# Patient Record
Sex: MALE | Race: WHITE | Employment: OTHER | URBAN - METROPOLITAN AREA
[De-identification: names, ages, dates, MRNs, and addresses within clinical notes are randomized per-mention and may not be internally consistent; named-entity substitution may affect disease eponyms.]

---

## 2017-01-05 ENCOUNTER — OFFICE VISIT (OUTPATIENT)
Dept: GASTROENTEROLOGY | Facility: CLINIC | Age: 47
End: 2017-01-05
Attending: NURSE PRACTITIONER
Payer: MEDICARE

## 2017-01-05 VITALS
HEIGHT: 69 IN | BODY MASS INDEX: 30.21 KG/M2 | SYSTOLIC BLOOD PRESSURE: 120 MMHG | TEMPERATURE: 98 F | DIASTOLIC BLOOD PRESSURE: 77 MMHG | WEIGHT: 204 LBS | RESPIRATION RATE: 16 BRPM | HEART RATE: 64 BPM | OXYGEN SATURATION: 97 %

## 2017-01-05 DIAGNOSIS — K74.60 CIRRHOSIS OF LIVER WITH ASCITES, UNSPECIFIED HEPATIC CIRRHOSIS TYPE (H): Chronic | ICD-10-CM

## 2017-01-05 DIAGNOSIS — I85.00 ESOPHAGEAL VARICES WITHOUT BLEEDING, UNSPECIFIED ESOPHAGEAL VARICES TYPE (H): Primary | ICD-10-CM

## 2017-01-05 DIAGNOSIS — R18.8 CIRRHOSIS OF LIVER WITH ASCITES, UNSPECIFIED HEPATIC CIRRHOSIS TYPE (H): Chronic | ICD-10-CM

## 2017-01-05 LAB
ALBUMIN SERPL-MCNC: 3.7 G/DL (ref 3.4–5)
ALP SERPL-CCNC: 231 U/L (ref 40–150)
ALT SERPL W P-5'-P-CCNC: 74 U/L (ref 0–70)
ANION GAP SERPL CALCULATED.3IONS-SCNC: 9 MMOL/L (ref 3–14)
AST SERPL W P-5'-P-CCNC: 75 U/L (ref 0–45)
BILIRUB DIRECT SERPL-MCNC: 0.5 MG/DL (ref 0–0.2)
BILIRUB SERPL-MCNC: 0.8 MG/DL (ref 0.2–1.3)
BUN SERPL-MCNC: 8 MG/DL (ref 7–30)
CALCIUM SERPL-MCNC: 8.9 MG/DL (ref 8.5–10.1)
CHLORIDE SERPL-SCNC: 102 MMOL/L (ref 94–109)
CO2 SERPL-SCNC: 24 MMOL/L (ref 20–32)
CREAT SERPL-MCNC: 1.05 MG/DL (ref 0.66–1.25)
ERYTHROCYTE [DISTWIDTH] IN BLOOD BY AUTOMATED COUNT: 13.7 % (ref 10–15)
GFR SERPL CREATININE-BSD FRML MDRD: 75 ML/MIN/1.7M2
GLUCOSE SERPL-MCNC: 261 MG/DL (ref 70–99)
HCT VFR BLD AUTO: 38.4 % (ref 40–53)
HGB BLD-MCNC: 13.3 G/DL (ref 13.3–17.7)
INR PPP: 1.19 (ref 0.86–1.14)
MCH RBC QN AUTO: 33.1 PG (ref 26.5–33)
MCHC RBC AUTO-ENTMCNC: 34.6 G/DL (ref 31.5–36.5)
MCV RBC AUTO: 96 FL (ref 78–100)
PLATELET # BLD AUTO: 51 10E9/L (ref 150–450)
POTASSIUM SERPL-SCNC: 4 MMOL/L (ref 3.4–5.3)
PROT SERPL-MCNC: 7.9 G/DL (ref 6.8–8.8)
RBC # BLD AUTO: 4.02 10E12/L (ref 4.4–5.9)
SODIUM SERPL-SCNC: 135 MMOL/L (ref 133–144)
WBC # BLD AUTO: 1.5 10E9/L (ref 4–11)

## 2017-01-05 PROCEDURE — 99213 OFFICE O/P EST LOW 20 MIN: CPT | Mod: ZF

## 2017-01-05 PROCEDURE — 80076 HEPATIC FUNCTION PANEL: CPT | Performed by: NURSE PRACTITIONER

## 2017-01-05 PROCEDURE — 85027 COMPLETE CBC AUTOMATED: CPT | Performed by: NURSE PRACTITIONER

## 2017-01-05 PROCEDURE — 80048 BASIC METABOLIC PNL TOTAL CA: CPT | Performed by: NURSE PRACTITIONER

## 2017-01-05 PROCEDURE — 85610 PROTHROMBIN TIME: CPT | Performed by: NURSE PRACTITIONER

## 2017-01-05 PROCEDURE — 36415 COLL VENOUS BLD VENIPUNCTURE: CPT | Performed by: NURSE PRACTITIONER

## 2017-01-05 ASSESSMENT — PAIN SCALES - GENERAL: PAINLEVEL: NO PAIN (0)

## 2017-01-05 NOTE — NURSING NOTE
"Chief Complaint   Patient presents with     RECHECK     alcohol induced cirrhosis       Initial /77 mmHg  Pulse 64  Temp(Src) 98  F (36.7  C) (Oral)  Resp 16  Ht 1.765 m (5' 9.49\")  Wt 92.534 kg (204 lb)  BMI 29.70 kg/m2  SpO2 97% Estimated body mass index is 29.7 kg/(m^2) as calculated from the following:    Height as of this encounter: 1.765 m (5' 9.49\").    Weight as of this encounter: 92.534 kg (204 lb).  BP completed using cuff size: large    "

## 2017-01-05 NOTE — PATIENT INSTRUCTIONS
Next available upper endoscopy (ideally in the next week or two)    Appointment with Dr Darling (nexta available), to discuss esophageal varices management    Appointment with Manuelito tellez, counselor. Kavita Bach will reach out to him and ask him to connect with you for a good time to meet.    Kavita Bach will also connect with Dr Frank Garcia, pharmacist, and see if his visit is covered.     Ok to reduce Lactulose to 15 ml daily, for a goal of 2-3 BMs per day. Please monitor for any changes in alertness/confusion. If so, please let Kavita Bach know as we can consider a new prescription for Xifaxan.    If you have any questions or concerns about your last test results or plan of care, please call our clinic at [phone].

## 2017-01-05 NOTE — PROGRESS NOTES
REASON FOR VISIT:  Followup cirrhosis.      HISTORY OF PRESENT ILLNESS:  Mr. Mono Varghese is a pleasant 46-year-old  male who has a history of alcohol-induced cirrhosis complicated by variceal bleeding as well as cognitive changes thought possibly related to some degree of hepatic encephalopathy.  He has followed with my colleague, Dr. Elizabet Darling, as well as myself.  [facility] for a followup appointment.  Overall, he is indicating that he is doing fairly well.  He denies any problems with abdominal pain, nausea, vomiting or heartburn.  His appetite is fair (he eats when he is hungry, which is about 2 times per day).  His weight is up approximately 3 pounds.  There had been some concerns recently regarding possible reaccumulation of ascites; however, he did have an ultrasound approximately 3 weeks ago which did not show any ascites.  He is currently taking lactulose 30 mL every other day and has approximately 5-6 bowel movements per day.  He really does not like this and does wonder whether or not it is really needed or is there an alternative.  He does occasionally see bright red blood per rectum when wiping when he does have a large number of bowel movements.  He does have a history of large esophageal varices (has had 6 EGDs over the last year and a half with banding).  He last had an upper endoscopy procedure in 10/2016, which showed large esophageal varices.  He was advised to have a repeat EGD in 6-8 weeks for retreatment, but unfortunately, it was not ordered nor scheduled.  He indicates that he is sleeping well with the use of gabapentin.  His energy level is low.  He does have a history of depression.  He is currently on an antidepressant as well as sees a psychiatrist but is not seeing a counselor at this time.  He would be open to seeing one of our new Behavioral Health counselors here at the [facility].  Of note, he does have a history of portal vein  thrombosis for which he had previously been on Lovenox and followed with Hematology as well as had an IVC filter placed, but has since had his IVC filter removed and stopped the Lovenox under the care of Hematology.  He does have a large number of medications.  Of note, he is currently on Bactrim double strength twice daily prescribed by his cancer physician, Dr. Shaw.  Of note, he has not seen a pharmacist to review his medications recently.  He did have a recent bone density study which showed his bone density to be normal.  He has immunity to both hepatitis A and B.  He has never had hepatitis B.  He has had pneumonia vaccination as well as flu shot recently.      ALLERGIES AND MEDICATIONS:  Listed in the electronic medical record.      PHYSICAL EXAMINATION:   GENERAL:  Mr. Varghese is a pleasant, middle-aged,  male in no apparent distress.   VITAL SIGNS:  Blood pressure 120/77, heart rate 64 beats per minute, respiratory rate 16, temperature 98, O2 sat 97% on room air.      LABORATORY DATA:  His most recent laboratory tests results from today showed the following:  White count 1.5, hemoglobin 13.3, platelet count 51,000.  INR 1.19.  Glucose 261, sodium 135, potassium 4, creatinine 1.05.  Total bilirubin 0.8, albumin 3.7, alkaline phosphatase 231, ALT 74, AST 75.      IMAGING:  Most recent abdominal ultrasound from 12/14/2016 showed a cirrhotic-appearing liver with splenomegaly.  No mention of any ascites or worrisome liver masses noted.      ASSESSMENT:  Alcohol-induced cirrhosis, large esophageal varices.      PLAN:  At this time, I did advise Mr. Varghese to have a prompt upper endoscopy procedure rescheduled.  Unfortunately, this was not scheduled at the time of his last upper endoscopy procedure.  He is open to seeing our new Behavioral Health counselor, Mr. Manuelito Lara, and I will reach out to him to see if he can see him when he is here next.  I did advise a followup appointment with Dr. Darling  to review his large varices and see if there are any other options that might be worth exploring.  We did spend time talking about general cirrhosis wellness measures including medication and surgical safety as well as risks of infection and prevention measures.  He is open to seeing one of our medication therapy management pharmacists but wants to see if that is covered by insurance.  If it is, he is willing to see Dr. Frank Garcia at a next visit.  He will have a next ultrasound in 05/2017 for HCC screening.  All of their questions were addressed.      Thirty minutes were spent with this patient, greater than 50% spent in counseling and coordination of care.      cc:  King Louis M.D.          Lauro Shaw M.D.         Elizabet Darling M.D.

## 2017-01-05 NOTE — Clinical Note
1/5/2017       RE: Mono Varghese  [id number] [facility] [address]     Dear Colleague,    Thank you for referring your patient, Mono Varghese, to the [facility]. Please see a copy of my visit note below.    REASON FOR VISIT:  Followup cirrhosis.      HISTORY OF PRESENT ILLNESS:  Mr. Mono Varghese is a pleasant 46-year-old  male who has a history of alcohol-induced cirrhosis complicated by variceal bleeding as well as cognitive changes thought possibly related to some degree of hepatic encephalopathy.  He has followed with my colleague, Dr. Elizabet Darling, as well as myself.  He returns to the [facility] today for a followup appointment.  Overall, he is indicating that he is doing fairly well.  He denies any problems with abdominal pain, nausea, vomiting or heartburn.  His appetite is fair (he eats when he is hungry, which is about 2 times per day).  His weight is up approximately 3 pounds.  There had been some concerns recently regarding possible reaccumulation of ascites; however, he did have an ultrasound approximately 3 weeks ago which did not show any ascites.  He is currently taking lactulose 30 mL every other day and has approximately 5-6 bowel movements per day.  He really does not like this and does wonder whether or not it is really needed or is there an alternative.  He does occasionally see bright red blood per rectum when wiping when he does have a large number of bowel movements.  He does have a history of large esophageal varices (has had 6 EGDs over the last year and a half with banding).  He last had an upper endoscopy procedure in 10/2016, which showed large esophageal varices.  He was advised to have a repeat EGD in 6-8 weeks for retreatment, but unfortunately, it was not ordered nor scheduled.  He indicates that he is sleeping well with the use of gabapentin.  His energy level is low.  He does have a history of  depression.  He is currently on an antidepressant as well as sees a psychiatrist but is not seeing a counselor at this time.  He would be open to seeing one of our new Behavioral Health counselors here at the [facility].  Of note, he does have a history of portal vein thrombosis for which he had previously been on Lovenox and followed with Hematology as well as had an IVC filter placed, but has since had his IVC filter removed and stopped the Lovenox under the care of Hematology.  He does have a large number of medications.  Of note, he is currently on Bactrim double strength twice daily prescribed by his cancer physician, Dr. Shaw.  Of note, he has not seen a pharmacist to review his medications recently.  He did have a recent bone density study which showed his bone density to be normal.  He has immunity to both hepatitis A and B.  He has never had hepatitis B.  He has had pneumonia vaccination as well as flu shot recently.      ALLERGIES AND MEDICATIONS:  Listed in the electronic medical record.      PHYSICAL EXAMINATION:   GENERAL:  Mr. Varghese is a pleasant, middle-aged,  male in no apparent distress.   VITAL SIGNS:  Blood pressure 120/77, heart rate 64 beats per minute, respiratory rate 16, temperature 98, O2 sat 97% on room air.      LABORATORY DATA:  His most recent laboratory tests results from today showed the following:  White count 1.5, hemoglobin 13.3, platelet count 51,000.  INR 1.19.  Glucose 261, sodium 135, potassium 4, creatinine 1.05.  Total bilirubin 0.8, albumin 3.7, alkaline phosphatase 231, ALT 74, AST 75.      IMAGING:  Most recent abdominal ultrasound from 12/14/2016 showed a cirrhotic-appearing liver with splenomegaly.  No mention of any ascites or worrisome liver masses noted.      ASSESSMENT:  Alcohol-induced cirrhosis, large esophageal varices.      PLAN:  At this time, I did advise Mr. Varghese to have a prompt upper endoscopy procedure rescheduled.  Unfortunately,  this was not scheduled at the time of his last upper endoscopy procedure.  He is open to seeing our new Behavioral Health counselor, . Manuelito Lara, and I will reach out to him to see if he can see him when he is here next.  I did advise a followup appointment with Dr. Darling to review his large varices and see if there are any other options that might be worth exploring.  We did spend time talking about general cirrhosis wellness measures including medication and surgical safety as well as risks of infection and prevention measures.  He is open to seeing one of our medication therapy management pharmacists but wants to see if that is covered by insurance.  If it is, he is willing to see Dr. Frank Garcia at a next visit.  He will have a next ultrasound in 05/2017 for HCC screening.  All of their questions were addressed.      Thirty minutes were spent with this patient, greater than 50% spent in counseling and coordination of care.     Sincerely,    Kavita Bach NP     cc:  King Louis M.D.          Lauro Shaw M.D.         Elizabet Darling M.D.

## 2017-01-06 ENCOUNTER — TELEPHONE (OUTPATIENT)
Dept: INTERNAL MEDICINE | Facility: CLINIC | Age: 47
End: 2017-01-06

## 2017-01-06 NOTE — TELEPHONE ENCOUNTER
Visit Activities (Refresh list every visit): Phone call    I called Mono at Kavita Bach's request to offer [facility] services.  No answer. Left VM explaining who I am and why I am calling, and inviting him to call at his convenience.    VM included my phone number for return call: [phone].      Manuelito Lara MA, NANI  Lead Behavioral Health Clinician  [facility]    [email or web address]       Phone: [phone] (mobility extension)  Pager: [phone]

## 2017-01-11 ENCOUNTER — OFFICE VISIT (OUTPATIENT)
Dept: FAMILY MEDICINE | Facility: CLINIC | Age: 47
End: 2017-01-11

## 2017-01-11 ENCOUNTER — TELEPHONE (OUTPATIENT)
Dept: GASTROENTEROLOGY | Facility: CLINIC | Age: 47
End: 2017-01-11

## 2017-01-11 VITALS
RESPIRATION RATE: 18 BRPM | OXYGEN SATURATION: 98 % | DIASTOLIC BLOOD PRESSURE: 77 MMHG | WEIGHT: 203.8 LBS | BODY MASS INDEX: 29.67 KG/M2 | TEMPERATURE: 98 F | HEART RATE: 68 BPM | SYSTOLIC BLOOD PRESSURE: 132 MMHG

## 2017-01-11 DIAGNOSIS — K08.9 POOR DENTITION: Primary | ICD-10-CM

## 2017-01-11 ASSESSMENT — PAIN SCALES - GENERAL: PAINLEVEL: NO PAIN (0)

## 2017-01-11 NOTE — PROGRESS NOTES
SUBJECTIVE:    Pt is a 46 year old male with pmh of     Patient Active Problem List   Diagnosis     Type 2 diabetes mellitus (H)     Moderate major depression (H)     LENA (obstructive sleep apnea)-moderate (AHI 16)     Oligoastrocytoma of parietal lobe (H)     ADHD (attention deficit hyperactivity disorder)     Financial difficulties     Thrombocytopenia (H)     Partial epilepsy with impairment of consciousness (H)     Cirrhosis of liver (H)     Pulmonary nodules     Myopic astigmatism of both eyes     Presbyopia     Ringing in ears, unspecified laterality     GIB (gastrointestinal bleeding)     Portal vein thrombosis     Advance care planning     Chronic pain     Hyperlipidemia LDL goal <100     Pancytopenia (H)     Hypothyroidism     Malnutrition (H)     Hx of psychiatric care       who is here for evaluation of had concerns including Liver and Forms.    Here for a f/u.  Since last saw me, no new concerns; per pt/wife who is here too, since last increase DM meds BGs range 110-200, so better. Tolerates.   Interval liver clinic note rvwd, labs too.  Form done/returned to them for free dental care; copy kept to scan, see that.    Allergies   Allergen Reactions     No Clinical Screening - See Comments      Coban and Surgilast cause itching     Tegaderm Transparent Dressing (Informational Only)      Latex Itching and Rash           Current Outpatient Prescriptions   Medication Sig Dispense Refill     omeprazole (PRILOSEC) 20 MG CR capsule Take 2 capsules (40 mg) by mouth 2 times daily 120 capsule 11     hydrOXYzine (ATARAX) 25 MG tablet Take 1 tablet (25 mg) by mouth 2 times daily 180 tablet 1     glipiZIDE (GLUCOTROL XL) 10 MG 24 hr tablet Take 1 tablet (10 mg) by mouth daily 30 tablet 3     levothyroxine (SYNTHROID/LEVOTHROID) 88 MCG tablet Take 1 tablet (88 mcg) by mouth daily 90 tablet 1     traZODone (DESYREL) 50 MG tablet Take 1.5 tablets (75 mg) by mouth nightly as needed for sleep 90 tablet 1     thiamine 100  MG tablet Take 1 tablet (100 mg) by mouth daily 100 tablet 1     cyanocobalamin 1000 MCG TABS 1,000 mcg by Per G Tube route daily 130 tablet 1     ferrous sulfate (IRON) 325 (65 FE) MG tablet Take 1 tablet (325 mg) by mouth 2 times daily 200 tablet 3     pravastatin (PRAVACHOL) 80 MG tablet Take 1 tablet (80 mg) by mouth daily 90 tablet 1     lactulose 20 GM/30ML SOLN Take 30 mLs by mouth every morning (Patient taking differently: Take 30 mLs by mouth every other day ) 946 mL 11     sulfamethoxazole-trimethoprim (BACTRIM DS,SEPTRA DS) 800-160 MG per tablet Take 1 tablet by mouth 2 times daily 60 tablet 5     gabapentin (NEURONTIN) 100 MG capsule Take 1 capsule (100 mg) by mouth At Bedtime 30 capsule 3     escitalopram (LEXAPRO) 20 MG tablet Take 1 tablet (20 mg) by mouth daily 90 tablet 3     Phenytoin (DILANTIN PO) Take 30 mg by mouth daily       acetaminophen (TYLENOL) 500 MG tablet Take 1 tablet (500 mg) by mouth every 6 hours as needed for mild pain 100 tablet 1     HYDROcodone-acetaminophen (NORCO) 5-325 MG per tablet Take 2 tablets by mouth every 6 hours as needed for moderate to severe pain 60 tablet 0     cholecalciferol (VITAMIN  -D) 1000 UNITS capsule Take 1 capsule (1,000 Units) by mouth daily 90 capsule 1     Calcium Carb-Cholecalciferol (CALCIUM 500 +D) 500-400 MG-UNIT TABS Take 500 mg by mouth daily 90 tablet 1     blood glucose monitoring (ONE TOUCH ULTRA) test strip Use to test blood sugars 4 times daily as needed or as directed. 300 each 0     hypromellose (ARTIFICIAL TEARS) 0.4 % SOLN Place 2 drops into both eyes every 8 hours 1 Bottle 11     multivitamin, therapeutic with minerals (THERA-VIT-M) TABS Take 1 tablet by mouth 2 times daily       glucose 40 % GEL Take 30 g by mouth every 15 minutes as needed for low blood sugar       melatonin 5 MG tablet Take 5 mg by mouth nightly as needed for sleep       ONE TOUCH LANCETS MISC by In Vitro route 4 times daily as needed 100 each prn       Social  History   Substance Use Topics     Smoking status: Never Smoker      Smokeless tobacco: Never Used     Alcohol Use: No      Comment: Quit 01/2014             OBJECTIVE:  /77 mmHg  Pulse 68  Temp(Src) 98  F (36.7  C) (Oral)  Resp 18  Wt 92.443 kg (203 lb 12.8 oz)  SpO2 98%  GENERAL APPEARANCE: Alert, no acute distress  Oral exam shows multiple teeth with erosions and decay  NEURO: Alert, oriented, speech and mentation normal    ASSESSMENT/PLAN:    DM: better per BG's, monitor, see me in a mo  Poor dentition:  Form done  15 min visit over half couns/coord care nearly all on dental care form  See me one mo  KAYODE LANGSTON MD

## 2017-01-11 NOTE — MR AVS SNAPSHOT
After Visit Summary   1/11/2017    Mono Varghese    MRN: [id number]           Patient Information     Date Of Birth          1970        Visit Information        Provider Department      1/11/2017 12:05 PM King Louis MD [facility]        Care Instructions    Primary Care Center Medication Refill Request Information:  * Please contact your pharmacy regarding ANY request for medication refills.  ** [facility] Prescription Fax = [phone]  * Please allow 3 business days for routine medication refills.  * Please allow 5 business days for controlled substance medication refills.     Primary Care Center Test Result notification information:  *You will be notified with in 7-10 days of your appointment day regarding the results of your test.  If you are on MyChart you will be notified as soon as the provider has reviewed the results and signed off on them.    Primary Care Center [phone]           Follow-ups after your visit        Follow-up notes from your care team     Return in about 1 month (around 2/11/2017).      Your next 10 appointments already scheduled     Jan 25, 2017   Procedure with Rocael Tejada MD   [facility] [place] ([facility], [facility])    [address] [place] [address]   [phone]           The [facility] is located on the corner of [facility] and [facility] on the [facility]. It is easily accessible from virtually any point in the [facility] area, via [address].            Feb 08, 2017 12:35 PM   (Arrive by 12:20 PM)   Return Visit with King Louis MD   [facility] ([facility])    [address] [place] [address]  4th [facility] [address] [phone]            Feb 21, 2017 10:00 AM   LAB with  LAB   [facility] ([facility])    [address] [place] [address]  Floor  [facility] [address]   [phone]           Patient must bring picture ID.  Patient should be prepared to give a urine specimen  Please do not eat 10-12 hours before your appointment if you are coming in fasting for labs on lipids, cholesterol, or glucose (sugar).  Pregnant women should follow their Care Team instructions. Water with medications is okay. Do not drink coffee or other fluids.   If you have concerns about taking  your medications, please ask at office or if scheduling via [employer], send a message by clicking on Secure Messaging, Message Your Care Team.            Feb 21, 2017 11:00 AM   (Arrive by 10:45 AM)   Return General Liver with Elizabet Darling MD   [facility] ([facility])    [address] [place] [address]  3rd Floor  [facility] [address]   [phone]            Mar 01, 2017  9:30 AM   [facility] Draw with  [facility] LAB DRAW   [facility] Draw ([facility])    [address] [place] [address]  2nd [facility] [id number] [phone]            Mar 01, 2017 10:00 AM   (Arrive by 9:45 AM)   MR BRAIN W/O & W CONTRAST with [id number]   [facility] MRI ([facility])    [address] [place] [address]  1st Floor  [facility] [address] [phone]           Take your medicines as usual, unless your doctor tells you not to. Bring a list of your current medicines to your exam (including vitamins, minerals and over-the-counter drugs).  You will be given intravenous contrast for this exam. To prepare:   The day before your exam, drink extra fluids at least six 8-ounce glasses (unless your doctor tells you to restrict your fluids).   Have a blood test (creatinine test) within 30 days of your exam. Go to your clinic or Diagnostic Imaging Department for this test.  The MRI machine uses a strong magnet. Please wear clothes without metal (snaps, zippers). A sweatsuit works well, or we may give you a  [facility] gown.  Please remove any body piercings and hair extensions before you arrive. You will also remove watches, jewelry, hairpins, wallets, dentures, partial dental plates and hearing aids. You may wear contact lenses, and you may be able to wear your rings. We have a safe place to keep your personal items, but it is safer to leave them at home.   **IMPORTANT** THE INSTRUCTIONS BELOW ARE ONLY FOR THOSE PATIENTS WHO HAVE BEEN TOLD THEY WILL RECEIVE SEDATION OR GENERAL ANESTHESIA DURING THEIR MRI PROCEDURE:  IF YOU WILL RECEIVE SEDATION (take medicine to help you relax during your exam):   You must get the medicine from your doctor before you arrive. Bring the medicine to the exam. Do not take it at home.   Arrive one hour early. Bring someone who can take you home after the test. Your medicine will make you sleepy. After the exam, you may not drive, take a bus or take a taxi by yourself.   No eating 8 hours before your exam. You may have clear liquids up until 4 hours before your exam. (Clear liquids include water, clear tea, black coffee and fruit juice without pulp.)  IF YOU WILL RECEIVE ANESTHESIA (be asleep for your exam):   Arrive 1 1/2 hours early. Bring someone who can take you home after the test. You may not drive, take a bus or take a taxi by yourself.   No eating 8 hours before your exam. You may have clear liquids up until 4 hours before your exam. (Clear liquids include water, clear tea, black coffee and fruit juice without pulp.)  Please call the Imaging Department at your exam site with any questions.            Mar 06, 2017 10:00 AM   (Arrive by 9:45 AM)   Return Visit with Lauro Shaw MD   [facility] ([facility])    [address]  2nd Floor  [facility] [address] [phone]              Who to contact     Please call your clinic at [phone] to:    Ask questions about your health    Make or cancel appointments    Discuss your  medicines    Learn about your test results    Speak to your doctor   If you have compliments or concerns about an experience at your clinic, or if you wish to file a complaint, please contact [facility] Patient Relations at [phone] or email us at [email or web address]         Additional Information About Your Visit        [employer] Information     [employer] gives you secure access to your electronic health record. If you see a primary care provider, you can also send messages to your care team and make appointments. If you have questions, please call your primary care clinic.  If you do not have a primary care provider, please call [phone] and they will assist you.      [employer] is an electronic gateway that provides easy, online access to your medical records. With [employer], you can request a clinic appointment, read your test results, renew a prescription or communicate with your care team.     To access your existing account, please contact your [facility] or call [phone] for assistance.        Care EveryWhere ID     This is your Care EveryWhere ID. This could be used by other organizations to access your [facility] medical records  [id number]        Your Vitals Were     Pulse Temperature Respirations Pulse Oximetry          68 98  F (36.7  C) (Oral) 18 98%         Blood Pressure from Last 3 Encounters:   01/11/17 132/77   01/05/17 120/77   12/09/16 123/85    Weight from Last 3 Encounters:   01/11/17 92.443 kg (203 lb 12.8 oz)   01/05/17 92.534 kg (204 lb)   12/09/16 93.441 kg (206 lb)              Today, you had the following     No orders found for display         Today's Medication Changes          These changes are accurate as of: 1/11/17 12:27 PM.  If you have any questions, ask your nurse or doctor.               These medicines have changed or have updated prescriptions.        Dose/Directions    lactulose 20 GM/30ML Soln   This  may have changed:  when to take this   Used for:  Hepatic encephalopathy (H)        Dose:  30 mL   Take 30 mLs by mouth every morning   Quantity:  946 mL   Refills:  11                Primary Care Provider Office Phone # Fax #    King Louis [phone]       PRIMARY CARE CENTER [address] [place] [address]  [facility] [address]        Thank you!     Thank you for choosing [facility]  for your care. Our goal is always to provide you with excellent care. Hearing back from our patients is one way we can continue to improve our services. Please take a few minutes to complete the written survey that you may receive in the mail after your visit with us. Thank you!             Your Updated Medication List - Protect others around you: Learn how to safely use, store and throw away your medicines at www.disposemymeds.org.          This list is accurate as of: 1/11/17 12:27 PM.  Always use your most recent med list.                   Brand Name Dispense Instructions for use    acetaminophen 500 MG tablet    TYLENOL    100 tablet    Take 1 tablet (500 mg) by mouth every 6 hours as needed for mild pain       blood glucose monitoring test strip    ONE TOUCH ULTRA    300 each    Use to test blood sugars 4 times daily as needed or as directed.       Calcium Carb-Cholecalciferol 500-400 MG-UNIT Tabs    calcium 500 +D    90 tablet    Take 500 mg by mouth daily       cholecalciferol 1000 UNITS capsule    vitamin  -D    90 capsule    Take 1 capsule (1,000 Units) by mouth daily       cyanocobalamin 1000 MCG Tabs     130 tablet    1,000 mcg by Per G Tube route daily       DILANTIN PO      Take 30 mg by mouth daily       escitalopram 20 MG tablet    LEXAPRO    90 tablet    Take 1 tablet (20 mg) by mouth daily       ferrous sulfate 325 (65 FE) MG tablet    IRON    200 tablet    Take 1 tablet (325 mg) by mouth 2 times daily       gabapentin 100 MG capsule    NEURONTIN    30 capsule    Take 1 capsule (100  mg) by mouth At Bedtime       glipiZIDE 10 MG 24 hr tablet    GLUCOTROL XL    30 tablet    Take 1 tablet (10 mg) by mouth daily       glucose 40 % Gel gel      Take 30 g by mouth every 15 minutes as needed for low blood sugar       HYDROcodone-acetaminophen 5-325 MG per tablet    NORCO    60 tablet    Take 2 tablets by mouth every 6 hours as needed for moderate to severe pain       hydrOXYzine 25 MG tablet    ATARAX    180 tablet    Take 1 tablet (25 mg) by mouth 2 times daily       hypromellose 0.4 % Soln ophthalmic solution    ARTIFICIAL TEARS    1 Bottle    Place 2 drops into both eyes every 8 hours       lactulose 20 GM/30ML Soln     946 mL    Take 30 mLs by mouth every morning       levothyroxine 88 MCG tablet    SYNTHROID/LEVOTHROID    90 tablet    Take 1 tablet (88 mcg) by mouth daily       melatonin 5 MG tablet      Take 5 mg by mouth nightly as needed for sleep       multivitamin, therapeutic with minerals Tabs tablet      Take 1 tablet by mouth 2 times daily       omeprazole 20 MG CR capsule    priLOSEC    120 capsule    Take 2 capsules (40 mg) by mouth 2 times daily       ONE TOUCH LANCETS Misc     100 each    by In Vitro route 4 times daily as needed       pravastatin 80 MG tablet    PRAVACHOL    90 tablet    Take 1 tablet (80 mg) by mouth daily       sulfamethoxazole-trimethoprim 800-160 MG per tablet    BACTRIM DS/SEPTRA DS    60 tablet    Take 1 tablet by mouth 2 times daily       thiamine 100 MG tablet     100 tablet    Take 1 tablet (100 mg) by mouth daily       traZODone 50 MG tablet    DESYREL    90 tablet    Take 1.5 tablets (75 mg) by mouth nightly as needed for sleep

## 2017-01-11 NOTE — NURSING NOTE
Chief Complaint   Patient presents with     Liver     Patient is here for recheck on liver issues     Forms     Patient is also here for forms for [facility]     Miky Reynolds CMA at 11:57 AM on 1/11/2017

## 2017-01-20 ENCOUNTER — TELEPHONE (OUTPATIENT)
Dept: GASTROENTEROLOGY | Facility: CLINIC | Age: 47
End: 2017-01-20

## 2017-01-20 NOTE — TELEPHONE ENCOUNTER
Wife returns my call and instructed her of new orders. Wife states that it was the oncologist who prescribed Bactrim in the first place. Would yo still recommend stopping it in two months or so?

## 2017-01-20 NOTE — TELEPHONE ENCOUNTER
He should reduce his dose of Bactrim to DS once daily (please change it in his med list and advise him). Stay on it for now. Will see what his next EGD shows. With no ascites, and ideally no bleeding, he likely can stop it entirely in the next 2 months or so.

## 2017-01-23 NOTE — TELEPHONE ENCOUNTER
I reviewed this with Dr Shaw/Oncologist who does not want to be further involved/not needed from an Oncology perspective. I reviewed this with Dr Tanner. With no ascites, we think he is not going to need it any longer, but want him to be on it (yet at a once daily dose rather than twice daily), until he gets through his next banding/EGD.

## 2017-01-25 ENCOUNTER — HOSPITAL ENCOUNTER (OUTPATIENT)
Facility: CLINIC | Age: 47
Discharge: HOME | End: 2017-01-25
Attending: INTERNAL MEDICINE | Admitting: INTERNAL MEDICINE
Payer: MEDICARE

## 2017-01-25 ENCOUNTER — TELEPHONE (OUTPATIENT)
Dept: GASTROENTEROLOGY | Facility: CLINIC | Age: 47
End: 2017-01-25

## 2017-01-25 ENCOUNTER — SURGERY (OUTPATIENT)
Age: 47
End: 2017-01-25

## 2017-01-25 VITALS
SYSTOLIC BLOOD PRESSURE: 127 MMHG | BODY MASS INDEX: 30.18 KG/M2 | WEIGHT: 203.79 LBS | RESPIRATION RATE: 20 BRPM | OXYGEN SATURATION: 99 % | DIASTOLIC BLOOD PRESSURE: 93 MMHG | HEIGHT: 69 IN

## 2017-01-25 PROCEDURE — 25000132 ZZH RX MED GY IP 250 OP 250 PS 637: Mod: GY | Performed by: INTERNAL MEDICINE

## 2017-01-25 PROCEDURE — G0500 MOD SEDAT ENDO SERVICE >5YRS: HCPCS | Performed by: INTERNAL MEDICINE

## 2017-01-25 PROCEDURE — 43239 EGD BIOPSY SINGLE/MULTIPLE: CPT | Performed by: INTERNAL MEDICINE

## 2017-01-25 PROCEDURE — 25000125 ZZHC RX 250: Performed by: INTERNAL MEDICINE

## 2017-01-25 PROCEDURE — 88305 TISSUE EXAM BY PATHOLOGIST: CPT | Performed by: INTERNAL MEDICINE

## 2017-01-25 RX ORDER — HEPARIN SODIUM (PORCINE) LOCK FLUSH IV SOLN 100 UNIT/ML 100 UNIT/ML
SOLUTION INTRAVENOUS PRN
Status: DISCONTINUED | OUTPATIENT
Start: 2017-01-25 | End: 2017-01-25 | Stop reason: HOSPADM

## 2017-01-25 RX ORDER — SIMETHICONE
LIQUID (ML) MISCELLANEOUS PRN
Status: DISCONTINUED | OUTPATIENT
Start: 2017-01-25 | End: 2017-01-25 | Stop reason: HOSPADM

## 2017-01-25 RX ORDER — ONDANSETRON 2 MG/ML
4 INJECTION INTRAMUSCULAR; INTRAVENOUS
Status: DISCONTINUED | OUTPATIENT
Start: 2017-01-25 | End: 2017-01-25 | Stop reason: HOSPADM

## 2017-01-25 RX ORDER — FENTANYL CITRATE 50 UG/ML
INJECTION, SOLUTION INTRAMUSCULAR; INTRAVENOUS PRN
Status: DISCONTINUED | OUTPATIENT
Start: 2017-01-25 | End: 2017-01-25 | Stop reason: HOSPADM

## 2017-01-25 RX ADMIN — BENZOCAINE 1 SPRAY: 220 SPRAY, METERED PERIODONTAL at 13:41

## 2017-01-25 RX ADMIN — MIDAZOLAM HYDROCHLORIDE 2 MG: 1 INJECTION, SOLUTION INTRAMUSCULAR; INTRAVENOUS at 13:42

## 2017-01-25 RX ADMIN — SODIUM CHLORIDE, PRESERVATIVE FREE 5 ML: 5 INJECTION INTRAVENOUS at 14:38

## 2017-01-25 RX ADMIN — FENTANYL CITRATE 50 MCG: 50 INJECTION, SOLUTION INTRAMUSCULAR; INTRAVENOUS at 13:44

## 2017-01-25 RX ADMIN — Medication 2 ML: at 13:07

## 2017-01-25 RX ADMIN — MIDAZOLAM HYDROCHLORIDE 1 MG: 1 INJECTION, SOLUTION INTRAMUSCULAR; INTRAVENOUS at 13:54

## 2017-01-25 RX ADMIN — FENTANYL CITRATE 50 MCG: 50 INJECTION, SOLUTION INTRAMUSCULAR; INTRAVENOUS at 13:48

## 2017-01-25 RX ADMIN — MIDAZOLAM HYDROCHLORIDE 1 MG: 1 INJECTION, SOLUTION INTRAMUSCULAR; INTRAVENOUS at 13:46

## 2017-01-25 RX ADMIN — FENTANYL CITRATE 50 MCG: 50 INJECTION, SOLUTION INTRAMUSCULAR; INTRAVENOUS at 13:42

## 2017-01-25 NOTE — IP AVS SNAPSHOT
MRN:[id number]                      After Visit Summary   1/25/2017    Mono Varghese    MRN: [id number]           Thank you!     Thank you for choosing [facility] for your care. Our goal is always to provide you with excellent care. Hearing back from our patients is one way we can continue to improve our services. Please take a few minutes to complete the written survey that you may receive in the mail after you visit with us. Thank you!        Patient Information     Date Of Birth          1970        About your hospital stay     You were admitted on:  January 25, 2017 You last received care in the:  [facility], Endoscopy    You were discharged on:  January 25, 2017       Who to Call     For medical emergencies, please call 911.  For non-urgent questions about your medical care, please call your primary care provider or clinic, [phone]  For questions related to your surgery, please call your surgery clinic        Attending Provider     Provider    Rocael Tejada MD       Primary Care Provider Office Phone # Fax #    [place] [phone]       [facility] [address] [place] [id number]        Your next 10 appointments already scheduled     Feb 08, 2017 12:35 PM   (Arrive by 12:20 PM)   Return Visit with [place] MD   [facility] ([facility])    [address] [place] [id number] [phone]            Feb 21, 2017 10:00 AM   LAB with  LAB   [facility] Lab ([facility])    [address] [place] [id number] [phone]           Patient must bring picture ID.  Patient should be prepared to give a urine specimen  Please do not eat 10-12 hours before your appointment if you are coming in fasting for labs on lipids, cholesterol, or glucose (sugar).  Pregnant women should follow their Care Team  instructions. Water with medications is okay. Do not drink coffee or other fluids.   If you have concerns about taking  your medications, please ask at office or if scheduling via [employer], send a message by clicking on Secure Messaging, Message Your Care Team.            Feb 21, 2017 11:00 AM   (Arrive by 10:45 AM)   Return General Liver with Elizabet Darling MD   [facility] Hepatology ([facility])    [address]  3rd Floor  [facility] [address]   [phone]            Mar 01, 2017  9:30 AM   [facility] Draw with  [facility] DRAW   [facility] Lab Draw ([facility])    [address]  2nd Floor  [facility] [address]   [phone]            Mar 01, 2017 10:00 AM   (Arrive by 9:45 AM)   MR BRAIN W/O & W CONTRAST with [id number]   [facility] MRI ([facility])    [address]  1st Floor  [facility] [address] [phone]           Take your medicines as usual, unless your doctor tells you not to. Bring a list of your current medicines to your exam (including vitamins, minerals and over-the-counter drugs).  You will be given intravenous contrast for this exam. To prepare:   The day before your exam, drink extra fluids at least six 8-ounce glasses (unless your doctor tells you to restrict your fluids).   Have a blood test (creatinine test) within 30 days of your exam. Go to your clinic or Diagnostic Imaging Department for this test.  The MRI machine uses a strong magnet. Please wear clothes without metal (snaps, zippers). A sweatsuit works well, or we may give you a hospital gown.  Please remove any body piercings and hair extensions before you arrive. You will also remove watches, jewelry, hairpins, wallets, dentures, partial dental plates and hearing aids. You may wear contact lenses, and you may be able to wear your rings. We have a safe place to keep your personal items, but it is safer  to leave them at home.   **IMPORTANT** THE INSTRUCTIONS BELOW ARE ONLY FOR THOSE PATIENTS WHO HAVE BEEN TOLD THEY WILL RECEIVE SEDATION OR GENERAL ANESTHESIA DURING THEIR MRI PROCEDURE:  IF YOU WILL RECEIVE SEDATION (take medicine to help you relax during your exam):   You must get the medicine from your doctor before you arrive. Bring the medicine to the exam. Do not take it at home.   Arrive one hour early. Bring someone who can take you home after the test. Your medicine will make you sleepy. After the exam, you may not drive, take a bus or take a taxi by yourself.   No eating 8 hours before your exam. You may have clear liquids up until 4 hours before your exam. (Clear liquids include water, clear tea, black coffee and fruit juice without pulp.)  IF YOU WILL RECEIVE ANESTHESIA (be asleep for your exam):   Arrive 1 1/2 hours early. Bring someone who can take you home after the test. You may not drive, take a bus or take a taxi by yourself.   No eating 8 hours before your exam. You may have clear liquids up until 4 hours before your exam. (Clear liquids include water, clear tea, black coffee and fruit juice without pulp.)  Please call the Imaging Department at your exam site with any questions.            Mar 06, 2017 10:00 AM   (Arrive by 9:45 AM)   Return Visit with Lauro Shaw MD   [facility] ([facility])    [address] [place] [address] [phone]              Further instructions from your care team       Discharge Instructions after  Upper Endoscopy (EGD)    Activity and Diet  You were given medicine for pain. You may be dizzy or sleepy.  For 24 hours:    Do not drive or use heavy equipment.    Do not make important decisions.    Do not drink any alcohol.  _x__ You may return to your regular diet.    Discomfort  You may have a sore throat for 2 to 3 days. It may help to:    Avoid hot liquids for 24 hours.    Use sore throat  "lozenges.    Gargle as needed with salt water up to 4 times a day. Mix 1 cup of warm water  with 1 teaspoon of salt. Do not swallow.    You may take Tylenol (acetaminophen) for pain unless your doctor has told you not to.    Do not take aspirin or ibuprofen (Advil, Motrin) or other NSAIDS  (anti-inflammatory drugs) for _3__ days.    Follow-up  _x__ We took small tissue samples for study. If you do not have a follow-up visit scheduled,  call your provider s office in 2 weeks for the results.    Other instructions: Reschedule procedure as directed by your physician.     When to call us:  Problems are rare. Call right away if you have:    Unusual throat pain or trouble swallowing    Unusual pain in belly or chest that is not relieved by belching or passing air    Black stools (tar-like looking bowel movement)    Temperature above 100.6  F. (37.5  C).    If you vomit blood or have severe pain, go to an emergency room.    If you have questions, call:  Monday to Friday, 7 a.m. to 4:30 p.m.: Endoscopy: [phone] (We may have to call you back)    After hours: Hospital: [phone] (Ask for the GI fellow on call)    Pending Results     Date and Time Order Name Status Description    1/25/2017 1358 Surgical pathology exam In process             Admission Information        Provider Department Dept Phone    1/25/2017 Rcoael Tejada MD [facility] [phone]      Your Vitals Were     Blood Pressure Respirations Height Weight BMI (Body Mass Index) Pulse Oximetry    121/83 mmHg 37 1.765 m (5' 9.49\") 92.44 kg (203 lb 12.7 oz) 29.67 kg/m2 94%      MyChart Information     [employer] gives you secure access to your electronic health record. If you see a primary care provider, you can also send messages to your care team and make appointments. If you have questions, please call your primary care clinic.  If you do not have a primary care provider, please call [phone] and they will assist you.        Care EveryWhere ID  "    This is your Care EveryWhere ID. This could be used by other organizations to access your [facility] medical records  [id number]           Review of your medicines      UNREVIEWED medicines. Ask your doctor about these medicines        Dose / Directions    acetaminophen 500 MG tablet   Commonly known as:  TYLENOL   Used for:  Tension headache        Dose:  500 mg   Take 1 tablet (500 mg) by mouth every 6 hours as needed for mild pain   Quantity:  100 tablet   Refills:  1       Calcium Carb-Cholecalciferol 500-400 MG-UNIT Tabs   Commonly known as:  calcium 500 +D   Used for:  Malnutrition (H)        Dose:  500 mg   Take 500 mg by mouth daily   Quantity:  90 tablet   Refills:  1       cholecalciferol 1000 UNITS capsule   Commonly known as:  vitamin  -D   Used for:  Malnutrition (H)        Dose:  1 capsule   Take 1 capsule (1,000 Units) by mouth daily   Quantity:  60 capsule   Refills:  0       cyanocobalamin 1000 MCG Tabs   Used for:  Alcoholic cirrhosis of liver with ascites (H)        Dose:  1000 mcg   1,000 mcg by Per G Tube route daily   Quantity:  130 tablet   Refills:  1       DILANTIN PO        Dose:  30 mg   Take 30 mg by mouth daily   Refills:  0       escitalopram 20 MG tablet   Commonly known as:  LEXAPRO   Used for:  Mild episode of recurrent major depressive disorder (H)        Dose:  20 mg   Take 1 tablet (20 mg) by mouth daily   Quantity:  90 tablet   Refills:  3       ferrous sulfate 325 (65 FE) MG tablet   Commonly known as:  IRON   Used for:  Other iron deficiency anemia        Dose:  1 tablet   Take 1 tablet (325 mg) by mouth 2 times daily   Quantity:  200 tablet   Refills:  3       gabapentin 100 MG capsule   Commonly known as:  NEURONTIN   Used for:  Mild episode of recurrent major depressive disorder (H)        Dose:  100 mg   Take 1 capsule (100 mg) by mouth At Bedtime   Quantity:  30 capsule   Refills:  3       glipiZIDE 10 MG 24 hr tablet   Commonly known as:  GLUCOTROL XL   Used for:  DM  type 2, goal HbA1c 7%-8% (H)        Dose:  10 mg   Take 1 tablet (10 mg) by mouth daily   Quantity:  30 tablet   Refills:  3       glucose 40 % Gel gel        Dose:  30 g   Take 30 g by mouth every 15 minutes as needed for low blood sugar   Refills:  0       HYDROcodone-acetaminophen 5-325 MG per tablet   Commonly known as:  NORCO   Used for:  Brain tumor (H)        Dose:  2 tablet   Take 2 tablets by mouth every 6 hours as needed for moderate to severe pain   Quantity:  60 tablet   Refills:  0       hydrOXYzine 25 MG tablet   Commonly known as:  ATARAX   Used for:  Itching, Anxiety        Dose:  25 mg   Take 1 tablet (25 mg) by mouth 2 times daily   Quantity:  180 tablet   Refills:  1       hypromellose 0.4 % Soln ophthalmic solution   Commonly known as:  ARTIFICIAL TEARS   Used for:  Presbyopia        Dose:  2 drop   Place 2 drops into both eyes every 8 hours   Quantity:  1 Bottle   Refills:  11       lactulose 20 GM/30ML Soln   Used for:  Hepatic encephalopathy (H)        Dose:  30 mL   Take 30 mLs by mouth every morning   Quantity:  946 mL   Refills:  11       levothyroxine 88 MCG tablet   Commonly known as:  SYNTHROID/LEVOTHROID   Used for:  Hypothyroidism        Dose:  88 mcg   Take 1 tablet (88 mcg) by mouth daily   Quantity:  90 tablet   Refills:  1       melatonin 5 MG tablet        Dose:  5 mg   Take 5 mg by mouth nightly as needed for sleep   Refills:  0       multivitamin, therapeutic with minerals Tabs tablet        Dose:  1 tablet   Take 1 tablet by mouth 2 times daily   Refills:  0       omeprazole 20 MG CR capsule   Commonly known as:  priLOSEC   Used for:  Gastroesophageal reflux disease without esophagitis        Dose:  40 mg   Take 2 capsules (40 mg) by mouth 2 times daily   Quantity:  120 capsule   Refills:  11       pravastatin 80 MG tablet   Commonly known as:  PRAVACHOL   Used for:  High cholesterol        Dose:  80 mg   Take 1 tablet (80 mg) by mouth daily   Quantity:  90 tablet   Refills:  1        sulfamethoxazole-trimethoprim 800-160 MG per tablet   Commonly known as:  BACTRIM DS/SEPTRA DS   Used for:  Spontaneous bacterial peritonitis (H)        Dose:  1 tablet   Take 1 tablet by mouth 2 times daily   Quantity:  60 tablet   Refills:  5       thiamine 100 MG tablet   Used for:  Alcoholic cirrhosis of liver with ascites (H)        Dose:  100 mg   Take 1 tablet (100 mg) by mouth daily   Quantity:  100 tablet   Refills:  1       traZODone 50 MG tablet   Commonly known as:  DESYREL   Used for:  Primary insomnia        Dose:  75 mg   Take 1.5 tablets (75 mg) by mouth nightly as needed for sleep   Quantity:  90 tablet   Refills:  1         CONTINUE these medicines which have NOT CHANGED        Dose / Directions    blood glucose monitoring test strip   Commonly known as:  ONE TOUCH ULTRA   Used for:  Diabetes mellitus, type 2 (H)        Use to test blood sugars 4 times daily as needed or as directed.   Quantity:  300 each   Refills:  0       ONE TOUCH LANCETS Misc   Used for:  Type II or unspecified type diabetes mellitus without mention of complication, not stated as uncontrolled        by In Vitro route 4 times daily as needed   Quantity:  100 each   Refills:  prn                Protect others around you: Learn how to safely use, store and throw away your medicines at www.disposemymeds.org.             Medication List: This is a list of all your medications and when to take them. Check marks below indicate your daily home schedule. Keep this list as a reference.      Medications           Morning Afternoon Evening Bedtime As Needed    acetaminophen 500 MG tablet   Commonly known as:  TYLENOL   Take 1 tablet (500 mg) by mouth every 6 hours as needed for mild pain                                blood glucose monitoring test strip   Commonly known as:  ONE TOUCH ULTRA   Use to test blood sugars 4 times daily as needed or as directed.                                Calcium Carb-Cholecalciferol 500-400 MG-UNIT  Tabs   Commonly known as:  calcium 500 +D   Take 500 mg by mouth daily                                cholecalciferol 1000 UNITS capsule   Commonly known as:  vitamin  -D   Take 1 capsule (1,000 Units) by mouth daily                                cyanocobalamin 1000 MCG Tabs   1,000 mcg by Per G Tube route daily                                DILANTIN PO   Take 30 mg by mouth daily                                escitalopram 20 MG tablet   Commonly known as:  LEXAPRO   Take 1 tablet (20 mg) by mouth daily                                ferrous sulfate 325 (65 FE) MG tablet   Commonly known as:  IRON   Take 1 tablet (325 mg) by mouth 2 times daily                                gabapentin 100 MG capsule   Commonly known as:  NEURONTIN   Take 1 capsule (100 mg) by mouth At Bedtime                                glipiZIDE 10 MG 24 hr tablet   Commonly known as:  GLUCOTROL XL   Take 1 tablet (10 mg) by mouth daily                                glucose 40 % Gel gel   Take 30 g by mouth every 15 minutes as needed for low blood sugar                                HYDROcodone-acetaminophen 5-325 MG per tablet   Commonly known as:  NORCO   Take 2 tablets by mouth every 6 hours as needed for moderate to severe pain                                hydrOXYzine 25 MG tablet   Commonly known as:  ATARAX   Take 1 tablet (25 mg) by mouth 2 times daily                                hypromellose 0.4 % Soln ophthalmic solution   Commonly known as:  ARTIFICIAL TEARS   Place 2 drops into both eyes every 8 hours                                lactulose 20 GM/30ML Soln   Take 30 mLs by mouth every morning                                levothyroxine 88 MCG tablet   Commonly known as:  SYNTHROID/LEVOTHROID   Take 1 tablet (88 mcg) by mouth daily                                melatonin 5 MG tablet   Take 5 mg by mouth nightly as needed for sleep                                multivitamin, therapeutic with minerals Tabs tablet   Take 1  tablet by mouth 2 times daily                                omeprazole 20 MG CR capsule   Commonly known as:  priLOSEC   Take 2 capsules (40 mg) by mouth 2 times daily                                ONE TOUCH LANCETS Misc   by In Vitro route 4 times daily as needed                                pravastatin 80 MG tablet   Commonly known as:  PRAVACHOL   Take 1 tablet (80 mg) by mouth daily                                sulfamethoxazole-trimethoprim 800-160 MG per tablet   Commonly known as:  BACTRIM DS/SEPTRA DS   Take 1 tablet by mouth 2 times daily                                thiamine 100 MG tablet   Take 1 tablet (100 mg) by mouth daily                                traZODone 50 MG tablet   Commonly known as:  DESYREL   Take 1.5 tablets (75 mg) by mouth nightly as needed for sleep

## 2017-01-25 NOTE — OR NURSING
EGD under conscious sedation with polyp removed via forceps.  Unable to complete exam due to pt retching throughout exam.  Post exam pt denies SOB/CP/abd pain.

## 2017-01-25 NOTE — DISCHARGE INSTRUCTIONS
Discharge Instructions after  Upper Endoscopy (EGD)    Activity and Diet  You were given medicine for pain. You may be dizzy or sleepy.  For 24 hours:    Do not drive or use heavy equipment.    Do not make important decisions.    Do not drink any alcohol.  _x__ You may return to your regular diet.    Discomfort  You may have a sore throat for 2 to 3 days. It may help to:    Avoid hot liquids for 24 hours.    Use sore throat lozenges.    Gargle as needed with salt water up to 4 times a day. Mix 1 cup of warm water  with 1 teaspoon of salt. Do not swallow.    You may take Tylenol (acetaminophen) for pain unless your doctor has told you not to.    Do not take aspirin or ibuprofen (Advil, Motrin) or other NSAIDS  (anti-inflammatory drugs) for _3__ days.    Follow-up  _x__ We took small tissue samples for study. If you do not have a follow-up visit scheduled,  call your provider s office in 2 weeks for the results.    Other instructions: Reschedule procedure as directed by your physician.     When to call us:  Problems are rare. Call right away if you have:    Unusual throat pain or trouble swallowing    Unusual pain in belly or chest that is not relieved by belching or passing air    Black stools (tar-like looking bowel movement)    Temperature above 100.6  F. (37.5  C).    If you vomit blood or have severe pain, go to an emergency room.    If you have questions, call:  Monday to Friday, 7 a.m. to 4:30 p.m.: Endoscopy: [phone] (We may have to call you back)    After hours: Hospital: [phone] (Ask for the GI fellow on call)

## 2017-01-25 NOTE — IP AVS SNAPSHOT
[facility], [place], Endoscopy    [address] [place] [address]    Phone:  [phone]                                       After Visit Summary   1/25/2017    Mono Varghese    MRN: [id number]           After Visit Summary Signature Page     I have received my discharge instructions, and my questions have been answered. I have discussed any challenges I see with this plan with the nurse or doctor.    ..........................................................................................................................................  Patient/Patient Representative Signature      ..........................................................................................................................................  Patient Representative Print Name and Relationship to Patient    ..................................................               ................................................  Date                                            Time    ..........................................................................................................................................  Reviewed by Signature/Title    ...................................................              ..............................................  Date                                                            Time

## 2017-01-25 NOTE — TELEPHONE ENCOUNTER
Pt seen today in GI lab for repeat EGD due to history of large esophageal varices and lost to followup after last EGD.    EGD shows several large varices which will need banding, however unfortunately pt could not tolerate exam due to constant retching. I see that all previous EGDs were scheduled with MAC but for some reason today's was not.    Please arrange for urgent repeat EGD in next 2-3 weeks with MAC for variceal banding.   Can be done with any GI staff. Please do not use one of my EUS slots for this.    PATRICIA Tejada MD  Associate Professor of Medicine  Division of Gastroenterology, Hepatology and Nutrition  [facility]

## 2017-01-26 LAB — COPATH REPORT: NORMAL

## 2017-01-27 LAB — UPPER GI ENDOSCOPY: NORMAL

## 2017-01-31 ENCOUNTER — TELEPHONE (OUTPATIENT)
Dept: GASTROENTEROLOGY | Facility: CLINIC | Age: 47
End: 2017-01-31

## 2017-01-31 ENCOUNTER — OFFICE VISIT (OUTPATIENT)
Dept: SURGERY | Facility: CLINIC | Age: 47
End: 2017-01-31

## 2017-01-31 ENCOUNTER — ANESTHESIA EVENT (OUTPATIENT)
Dept: GASTROENTEROLOGY | Facility: CLINIC | Age: 47
End: 2017-01-31
Payer: MEDICARE

## 2017-01-31 ENCOUNTER — APPOINTMENT (OUTPATIENT)
Dept: SURGERY | Facility: CLINIC | Age: 47
End: 2017-01-31

## 2017-01-31 ENCOUNTER — MEDICAL CORRESPONDENCE (OUTPATIENT)
Dept: HEALTH INFORMATION MANAGEMENT | Facility: CLINIC | Age: 47
End: 2017-01-31

## 2017-01-31 VITALS
DIASTOLIC BLOOD PRESSURE: 89 MMHG | OXYGEN SATURATION: 97 % | BODY MASS INDEX: 28.65 KG/M2 | TEMPERATURE: 97.8 F | WEIGHT: 200.1 LBS | SYSTOLIC BLOOD PRESSURE: 124 MMHG | HEIGHT: 70 IN | HEART RATE: 72 BPM | RESPIRATION RATE: 16 BRPM

## 2017-01-31 DIAGNOSIS — K70.30 ALCOHOLIC CIRRHOSIS OF LIVER WITHOUT ASCITES (H): Primary | ICD-10-CM

## 2017-01-31 DIAGNOSIS — K70.30 ALCOHOLIC CIRRHOSIS OF LIVER WITHOUT ASCITES (H): ICD-10-CM

## 2017-01-31 LAB
CREAT SERPL-MCNC: 1.05 MG/DL (ref 0.66–1.25)
ERYTHROCYTE [DISTWIDTH] IN BLOOD BY AUTOMATED COUNT: 14.1 % (ref 10–15)
GFR SERPL CREATININE-BSD FRML MDRD: 75 ML/MIN/1.7M2
HCT VFR BLD AUTO: 36.8 % (ref 40–53)
HGB BLD-MCNC: 12.8 G/DL (ref 13.3–17.7)
INR PPP: 1.28 (ref 0.86–1.14)
MCH RBC QN AUTO: 32.8 PG (ref 26.5–33)
MCHC RBC AUTO-ENTMCNC: 34.8 G/DL (ref 31.5–36.5)
MCV RBC AUTO: 94 FL (ref 78–100)
PLATELET # BLD AUTO: 41 10E9/L (ref 150–450)
RBC # BLD AUTO: 3.9 10E12/L (ref 4.4–5.9)
WBC # BLD AUTO: 1.7 10E9/L (ref 4–11)

## 2017-01-31 ASSESSMENT — ENCOUNTER SYMPTOMS: SEIZURES: 1

## 2017-01-31 NOTE — TELEPHONE ENCOUNTER
DATE:  1/31/2017   TIME OF RECEIPT FROM LAB:  1249  LAB TEST:  PLT  LAB VALUE:  41  RESULTS GIVEN WITH READ-BACK TO (PROVIDER):  Brook Keller  TIME LAB VALUE REPORTED TO PROVIDER:   125

## 2017-01-31 NOTE — ANESTHESIA PREPROCEDURE EVALUATION
Anesthesia Evaluation     . Pt has had prior anesthetic. Type: General and MAC (nausea)      ROS/MED HX    ENT/Pulmonary:     (+)sleep apnea, doesn't use CPAP , . .    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 : . . . :. . Previous cardiac testing Echodate:11/24/15results:Hyperdynamic LV; EF 65-70%date: results:ECG reviewed date:2/11/16 results: date: results:          METS/Exercise Tolerance:  3 - Able to walk 1-2 blocks without stopping   Hematologic:     (+) History of blood clots pt is not anticoagulated, Anemia, History of Transfusion no previous transfusion reaction -      Musculoskeletal:         GI/Hepatic:     (+) GERD liver disease, Other GI/Hepatic Liver cirrhosis complications of pancytopenia, multiple thromboses, hepatic encephalopathy and SBP      Renal/Genitourinary: Comment: ARF in setting of hemorrhagic shock 2015    (+) chronic renal disease, type: ARF, Pt does not require dialysis, Pt has no history of transplant,       Endo:     (+) type II DM Last HgA1c: 8.2% date: 12/2016 Not using insulin Normal glucose range:  not previously admitted for DM/DKA thyroid problem hypothyroidism, Obesity, .      Psychiatric:     (+) psychiatric history depression and other (comment) (ADHD)      Infectious Disease:  - neg infectious disease ROS       Malignancy:   (+) Malignancy History of Neuro  Neuro CA status post Surgery, Chemo and Radiation.          Other:    (+) No chance of pregnancy no H/O Chronic Pain,no other significant disability              Physical Exam      Airway   Mallampati: II  TM distance: >3 FB  Neck ROM: full    Dental   (+) missing and chipped    Cardiovascular   Rhythm and rate: regular and normal      Pulmonary     Other findings: LABS:  WBC      1.5   1/5/2017  RBC     4.02    1/5/2017  HGB     13.3   1/5/2017  HCT     38.4   1/5/2017  MCV       96   1/5/2017  MCH     33.1   1/5/2017  MCHC     34.6   1/5/2017  RDW     13.7   1/5/2017  PLT       51   1/5/2017  Last Basic Metabolic Panel:  NA      135   1/5/2017   POTASSIUM      4.0   1/5/2017  CHLORIDE      102   1/5/2017  UCHE      8.9   1/5/2017  CO2       24   1/5/2017  BUN        8   1/5/2017  CR     1.05   1/5/2017  GLC      261   1/5/2017             PAC Discussion and Assessment    ASA Classification: 3  Case is suitable for:   Anesthetic techniques and relevant risks discussed: MAC with GA as backup  Invasive monitoring and risk discussed:   Types:   Possibility and Risk of blood transfusion discussed:   NPO instructions given:   Additional anesthetic preparation and risks discussed:   Needs early admission to pre-op area:   Other:     PAC Resident/NP Anesthesia Assessment:  46 year old male for EGD for banding of nonbleeding Grade II esophageal varices, with Dr. Jordana Ramirez, on 2/2/17, under MAC. PAC  referral for risk assessment and optimization for anesthesia with comorbid conditions of:  1.) Alcohol induced liver cirrhosis. Last ETOH 2 yrs ago. Complications: malnutrition, thromboses (DVT, portal vein and superior mesenteric) in setting of 2015  ICU admission for GI bleed due to varices with hemorraghic shock . JOSEF. Temporary IVC filter removed. Chronic pancytopenia since 2012 (wbc 1.5, HGB 13, platelets 51,000). Hepatic encephalopathy with SBP 2015. On Bactrim and lactulose, also PPI. Cognitive issues of confusion / memory loss present initially after brain tumor and persisted. Pt is A& O x 3 today, memory impaired. Denies active bleeding, pain, stools dark due to iron, no new confusion. Weight graph-no gain. Followed byPMD/GI. Nursing home visits weekly.  -Platelet count 41,000 today (51,000 last check). Dr. Ramirez notified by EPIC paging and will proceed with EGD without platelet infusion.  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 Last seizure fall 2014. On Dilantin. Some lightheadedness, no syncope. HEADACHE intermittently. No chronic pain.  HLD. No known CAD. EKG 2/11/16 NSR. ECHO 4/2013 LV function normal with EF 60-65% with repeat-limited study 11/2015 hyperdynamic LV EF 65-70%, normal valves.  3.) Overweight with BMI 28. LENA (AHI = 16) , CPAP not recommended. Pulmonary nodules.   4) Hypothyroid on synthroid.  5.) chronic depression on desyrel and lexapro. Insomnia.  Last GA was 2013 for craniotomy with no anesthesia complications. Multiple EGDs under MAC.  Pt also evaluated by Dr. VIPUL Tavera.    Reviewed and Signed by PAC Mid-Level Provider/Resident  Mid-Level Provider/Resident: Brook Keller PA-C  Date: 1/31/17  Time: 12:17 PM    Attending Anesthesiologist Anesthesia Assessment:  45 yo male for repeat banding of esophageal varices.  Complex liver history as above, with severe bleeding from esophageal varices one year ago. Current Plt count 41, GI service notified.  S/p brain tumor removal 2014 with associated seizure disorder-last episode 2014.  Hypothyroid on HRT.  Good cardiac function, low risk as per ACC guidelines. No prior anesthetic problems.    Reviewed and Signed by PAC Anesthesiologist  Anesthesiologist: Karina Tavera MD  Date:   Time:   Pass/Fail:   Disposition:     PAC Pharmacist Assessment:        Pharmacist:   Date:   Time:      Anesthesia Plan      History & Physical Review  History and physical reviewed and following examination; no interval change.    ASA Status:  3 .    NPO Status:  > 6 hours    Plan for MAC with Intravenous induction. Maintenance will be TIVA.  Reason for MAC:  Difficulty with conscious sedation (QS)         Postoperative Care      Consents        I have examined the patient and reviewed the record with the above resident or APN and agree with above assessment and recommendations.     Steven Hinds M.D.  Staff  Anesthesiologist  February 2, 2017, 1:44 PM                    .

## 2017-01-31 NOTE — H&P
Pre-Operative H & P     CC:  Preoperative exam to assess for increased cardiopulmonary risk while undergoing surgery and anesthesia.    Date of Encounter: 1/31/2017  Primary Care Physician:  [place] KAREN Varghese is a 46 year old male who presents for pre-operative H & P in preparation for EGD with Dr. Jordana Ramirez on 2/2/17 at [facility]. He has liver cirrhosis with complications of portal hypertensive gastropathy and esophageal varices, last banded 3/2016. He had a life threatening GI bleed in 2015 with blood transfusion and hemorraghic shock.   His liver ds has been stable per his wife and last family med note. No bleeding, no abdominal pain, N/V, tremors. Last ETOH was 2 yrs ago.   EGD 1/25/17 was attempted without MAC and pt was inadequately sedated so this procedure is to go back under MAC to band the varices seen.     History is obtained from the patient.     Past Medical History  Past Medical History   Diagnosis Date     Brain tumor (H)     Alcohol induced liver cirrhosis     Hepatic encepalopathy history     pancytopenia     thrombocytopenia     depression     JOSEF- resolved 2015    Hypothyroid     Type 2 diabetes     HLD      Liver failure (H)        Past Surgical History  Past Surgical History   Procedure Laterality Date     Orthopedic surgery       hand surgery- right     Optical tracking system craniotomy, excise tumor, combined  6/6/2013     Procedure: COMBINED OPTICAL TRACKING SYSTEM CRANIOTOMY, EXCISE TUMOR;  Left Stealth Guided Craniotomy , Tumor Resection ;  Surgeon: J Carlos Aranda MD;  Location: [facility] OR     Esophagoscopy, gastroscopy, duodenoscopy (egd), combined N/A 11/23/2015     Procedure: COMBINED ESOPHAGOSCOPY, GASTROSCOPY, DUODENOSCOPY (EGD);  Surgeon: Rocael Rizvi MD;  Location: [facility] OR     Sigmoidoscopy flexible N/A 11/24/2015     Procedure: SIGMOIDOSCOPY FLEXIBLE;  Surgeon: Rocael Rizvi MD;  Location: [facility] GI      Colonoscopy N/A 11/27/2015     Procedure: COMBINED COLONOSCOPY, SINGLE OR MULTIPLE BIOPSY/POLYPECTOMY BY BIOPSY;  Surgeon: Ran Thurston MD;  [facility]      N/A 12/28/2015     Procedure: ANESTHESIA OUT OF OR;  Surgeon: GENERIC ANESTHESIA PROVIDER;  Location:  OR     Esophagoscopy, gastroscopy, duodenoscopy (egd), combined N/A 1/25/2017     Procedure: COMBINED ESOPHAGOSCOPY, GASTROSCOPY, DUODENOSCOPY (EGD), BIOPSY SINGLE OR MULTIPLE;  Surgeon: Rocael Tejada MD;  Location: [facility] GI       Hx of Blood transfusions/reactions: yes 1 year ago with no reactions     Hx of abnormal bleeding or anti-platelet use: GIB 2015, none since; no anti-platelets    Menstrual history: No LMP for male patient.:     Steroid use in the last year: no    Personal or FH with difficulty with Anesthesia:  no    Prior to Admission Medications  Current Outpatient Prescriptions   Medication Sig Dispense Refill     cholecalciferol (VITAMIN  -D) 1000 UNITS capsule Take 1 capsule (1,000 Units) by mouth daily (Patient taking differently: Take 1 capsule by mouth every morning ) 60 capsule 0     omeprazole (PRILOSEC) 20 MG CR capsule Take 2 capsules (40 mg) by mouth 2 times daily 120 capsule 11     hydrOXYzine (ATARAX) 25 MG tablet Take 1 tablet (25 mg) by mouth 2 times daily 180 tablet 1     glipiZIDE (GLUCOTROL XL) 10 MG 24 hr tablet Take 1 tablet (10 mg) by mouth daily (Patient taking differently: Take 10 mg by mouth every morning ) 30 tablet 3     levothyroxine (SYNTHROID/LEVOTHROID) 88 MCG tablet Take 1 tablet (88 mcg) by mouth daily (Patient taking differently: Take 88 mcg by mouth every morning ) 90 tablet 1     traZODone (DESYREL) 50 MG tablet Take 1.5 tablets (75 mg) by mouth nightly as needed for sleep 90 tablet 1     thiamine 100 MG tablet Take 1 tablet (100 mg) by mouth daily (Patient taking differently: Take 100 mg by mouth every morning ) 100 tablet 1     cyanocobalamin 1000 MCG TABS 1,000 mcg by Per G Tube route  daily (Patient taking differently: Take 1,000 mcg by mouth daily ) 130 tablet 1     ferrous sulfate (IRON) 325 (65 FE) MG tablet Take 1 tablet (325 mg) by mouth 2 times daily 200 tablet 3     pravastatin (PRAVACHOL) 80 MG tablet Take 1 tablet (80 mg) by mouth daily (Patient taking differently: Take 80 mg by mouth every morning ) 90 tablet 1     lactulose 20 GM/30ML SOLN Take 30 mLs by mouth every morning (Patient taking differently: Take 30 mLs by mouth every other day ) 946 mL 11     sulfamethoxazole-trimethoprim (BACTRIM DS,SEPTRA DS) 800-160 MG per tablet Take 1 tablet by mouth 2 times daily (Patient taking differently: Take 1 tablet by mouth every morning ) 60 tablet 5     gabapentin (NEURONTIN) 100 MG capsule Take 1 capsule (100 mg) by mouth At Bedtime 30 capsule 3     escitalopram (LEXAPRO) 20 MG tablet Take 1 tablet (20 mg) by mouth daily (Patient taking differently: Take 20 mg by mouth every morning ) 90 tablet 3     Phenytoin (DILANTIN PO) Take 30 mg by mouth every morning        acetaminophen (TYLENOL) 500 MG tablet Take 1 tablet (500 mg) by mouth every 6 hours as needed for mild pain 100 tablet 1     HYDROcodone-acetaminophen (NORCO) 5-325 MG per tablet Take 2 tablets by mouth every 6 hours as needed for moderate to severe pain 60 tablet 0     Calcium Carb-Cholecalciferol (CALCIUM 500 +D) 500-400 MG-UNIT TABS Take 500 mg by mouth daily (Patient taking differently: Take 500 mg by mouth every morning ) 90 tablet 1     blood glucose monitoring (ONE TOUCH ULTRA) test strip Use to test blood sugars 4 times daily as needed or as directed. 300 each 0     hypromellose (ARTIFICIAL TEARS) 0.4 % SOLN Place 2 drops into both eyes every 8 hours 1 Bottle 11     multivitamin, therapeutic with minerals (THERA-VIT-M) TABS Take 1 tablet by mouth 2 times daily       glucose 40 % GEL Take 30 g by mouth every 15 minutes as needed for low blood sugar       melatonin 5 MG tablet Take 5 mg by mouth nightly as needed for sleep        ONE TOUCH LANCETS MISC by In Vitro route 4 times daily as needed 100 each prn       Allergies  Allergies   Allergen Reactions     No Clinical Screening - See Comments      Coban and Surgilast cause itching     Tegaderm Transparent Dressing (Informational Only)      Latex Itching and Rash       Social History  Social History     Social History     Marital Status:      Spouse Name: Yisel      Number of Children: 1      Years of Education: N/A     Occupational History            Social History Main Topics     Smoking status: Never Smoker      Smokeless tobacco: Never Used     Alcohol Use: No      Comment: Quit 01/2014     Drug Use: No     Sexual Activity:     Partners: Female      Comment: not currently      Other Topics Concern     Parent/Sibling W/ Cabg, Mi Or Angioplasty Before 65f 55m? No     Social History Narrative    On disability        Family History  Family History   Problem Relation Age of Onset     Unknown/Adopted Mother          ROS/MED HX    ENT/Pulmonary:     (+)sleep apnea, doesn't use CPAP , . .    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 : . . . :. . Previous cardiac testing Echodate:11/24/15results:date: results:ECG reviewed date:2/11/16 results: date: results:          METS/Exercise Tolerance:  3 - Able to walk 1-2 blocks without stopping   Hematologic:     (+) History of blood clots pt is not anticoagulated, Anemia, History of Transfusion no previous transfusion reaction -      Musculoskeletal:         GI/Hepatic:     (+) GERD liver disease, Other GI/Hepatic Liver cirrhosis complications of pancytopenia, multiple thromboses, hepatic encephalopathy and SBP      Renal/Genitourinary: Comment: ARF in setting of hemorrhagic shock 2015    (+) chronic renal disease, type: ARF, Pt does not  "require dialysis, Pt has no history of transplant,       Endo:     (+) type II DM Last HgA1c: 8.2% date: 12/2016 Not using insulin Normal glucose range:  not previously admitted for DM/DKA thyroid problem hypothyroidism, Obesity, .      Psychiatric:     (+) psychiatric history depression and other (comment) (ADHD)      Infectious Disease:  - neg infectious disease ROS       Malignancy:   (+) Malignancy History of Neuro  Neuro CA status post Surgery, Chemo and Radiation.          Other:    (+) No chance of pregnancy no H/O Chronic Pain,no other significant disability        \  The complete review of systems is negative other than noted in the HPI or here.   Temp: 97.8  F (36.6  C) Temp src: Oral BP: 124/89 mmHg Pulse: 72   Resp: 16 SpO2: 97 %         200 lbs 1.6 oz  5' 10\"   Body mass index is 28.71 kg/(m^2).       Physical Exam  Constitutional: Awake, alert, cooperative, no apparent distress, and appears stated age.  Eyes: Pupils equal, round and reactive to light, extra ocular muscles intact, sclera clear, conjunctiva normal.  HENT: Normocephalic, oral pharynx with moist mucus membranes, poor dentition with stained teeth and multiple chipped and missing teeth. No goiter appreciated.   Respiratory: Clear to auscultation bilaterally, no crackles or wheezing.  Cardiovascular: Regular rate and rhythm, normal S1 and S2, and no murmur noted.  Carotids +2, no bruits. No LE edema. Palpable pulses to radial  DP and PT arteries.   GI: Normal bowel sounds, soft, non-tender, no masses palpated, no hepatosplenomegaly appreciated. Slight bulging of flanks that appears to be from truncal obesity.   Surgical scars: LUQ well healed hole from PEG.  Lymph/Hematologic: No cervical lymphadenopathy and no supraclavicular lymphadenopathy.  Genitourinary:  deferred  Skin: Warm and dry.  No rashes at anticipated surgical site.   Musculoskeletal: Full ROM of neck. There is no redness, warmth, or swelling of the joints. Gross motor " strength is tested and normal.    Neurologic: Awake, alert, oriented to name, place and time. Cranial nerves II-XII are grossly intact. Gait is slow but normal.   Neuropsychiatric: Calm, cooperative. [place] affect with joking at times. COnfused re: his history. Speech slow and delayed but appropriate responsed. Looks to his wife for answers    Labs: (personally reviewed)  WBC      1.5   1/5/2017  RBC     4.02   1/5/2017  HGB     13.3   1/5/2017  HCT     38.4   1/5/2017  No components found with this name: mct  MCV       96   1/5/2017  MCH     33.1   1/5/2017  MCHC     34.6   1/5/2017  RDW     13.7   1/5/2017  PLT       51   1/5/2017  Last Basic Metabolic Panel:  NA      135   1/5/2017   POTASSIUM      4.0   1/5/2017  CHLORIDE      102   1/5/2017  UCHE      8.9   1/5/2017  CO2       24   1/5/2017  BUN        8   1/5/2017  CR     1.05   1/5/2017  GLC      261   1/5/2017      EKG 2/11/16 NSR.   ECHO 4/2013 LV function normal with EF 60-65% with repeat-limited study 11/2015 hyperdynamic LV EF 65-70%, normal valves.    ASSESSMENT and PLAN  Mono Varghese is a 46 year old male scheduled to undergo EGD for banding of nonbleeding Grade II esophageal varices, with Dr. Jordana Ramirez, on 2/2/17, under MAC. He has the following comorbid conditions:  1.) Alcohol induced liver cirrhosis. Last ETOH 2 yrs ago. Complications include: malnutrition, thromboses (DVT, portal vein and superior mesenteric) in setting of 2015 ICU admission for GI bleed due to varices with hemorraghic shock . JOSEF. Last Cr 1.05. Temporary IVC filter removed. Chronic pancytopenia since 2012 (wbc 1.5, HGB 13, platelets 51,000). Hepatic encephalopathy with SBP 2015. Currently on once day prophylactic dose Bactrim, every other day lactulose. Also on PPI. Cognitive issues of confusion / memory loss present initially after brain tumor and perisited Exam reveals need for his wife to answer medical questions. Pt is A& O x 3 today. Denies active  bleeding, pain, stools dark due to iron, no change in usual confusion. Some slight increase in abdominal girth, reported to and followed byPMD/GI. Last MELD score =9.   Stable per notes and view of patient's wife.  -Review weights in vital graph, actually lost 3 lbs last 1 month.  -Continue Nursing home visits weekly  -Hold meds as per GI instructions  -cbc with platelets, INR. Platelet count 41,000 today (51,000 last check).   - Dr. Ramirez notified by EPIC paging and will proceed with EGD without platelet infusion.  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 Last seizure fall 2014. On Dilantin. Some lightheadedness, no syncope. HEADACHE intermittently. No chronic pain.   -Continue dilantin to DOS  3.) Overweight with BMI 28. LENA (AHI = 16) , CPAP not recommended. Pulmonary nodules.   4) Hypothyroid on synthroid.   -Continue synthroid to DOS  5.) chronic depression on desyrel and lexapro. Insomnia.  -May take these meds  . He has the following specific operative considerations:   - RCRI : No serious cardiac risks.  0.4% risk of major adverse cardiac event.   - Anesthesia considerations:  Refer to PAC assessment in anesthesia records  - VTE risk: Elevated risk due to previous clots  - Post-op delirium risk: may be elevated due to cognitive delay since brain surgery complicated by episode of encephalopathy  - Risk of PONV score = 1.  If > 2, anti-emetic intervention recommended.      Patient was discussed with Dr MAYO Tavera.     JOSE López  Preoperative Assessment Center  [facility]  Clinic and [facility] Center  Phone: [phone]  Fax: [phone]

## 2017-02-01 LAB
ABO + RH BLD: NORMAL
ABO + RH BLD: NORMAL
BLD GP AB SCN SERPL QL: NORMAL
BLOOD BANK CMNT PATIENT-IMP: NORMAL
BLOOD BANK CMNT PATIENT-IMP: NORMAL
SPECIMEN EXP DATE BLD: NORMAL

## 2017-02-02 ENCOUNTER — HOSPITAL ENCOUNTER (OUTPATIENT)
Facility: CLINIC | Age: 47
Discharge: HOME | End: 2017-02-02
Attending: INTERNAL MEDICINE | Admitting: INTERNAL MEDICINE
Payer: MEDICARE

## 2017-02-02 ENCOUNTER — SURGERY (OUTPATIENT)
Age: 47
End: 2017-02-02

## 2017-02-02 ENCOUNTER — ANESTHESIA (OUTPATIENT)
Dept: GASTROENTEROLOGY | Facility: CLINIC | Age: 47
End: 2017-02-02
Payer: MEDICARE

## 2017-02-02 VITALS
OXYGEN SATURATION: 95 % | TEMPERATURE: 97.6 F | DIASTOLIC BLOOD PRESSURE: 101 MMHG | WEIGHT: 200.2 LBS | RESPIRATION RATE: 15 BRPM | BODY MASS INDEX: 28.73 KG/M2 | SYSTOLIC BLOOD PRESSURE: 118 MMHG | HEART RATE: 61 BPM

## 2017-02-02 DIAGNOSIS — K74.60 ESOPHAGEAL VARICES IN CIRRHOSIS (H): Primary | ICD-10-CM

## 2017-02-02 DIAGNOSIS — I85.10 ESOPHAGEAL VARICES IN CIRRHOSIS (H): Primary | ICD-10-CM

## 2017-02-02 LAB
GLUCOSE BLDC GLUCOMTR-MCNC: 154 MG/DL (ref 70–99)
UPPER GI ENDOSCOPY: NORMAL

## 2017-02-02 PROCEDURE — 43244 EGD VARICES LIGATION: CPT | Performed by: INTERNAL MEDICINE

## 2017-02-02 PROCEDURE — 25800025 ZZH RX 258: Performed by: NURSE ANESTHETIST, CERTIFIED REGISTERED

## 2017-02-02 PROCEDURE — 25000132 ZZH RX MED GY IP 250 OP 250 PS 637: Mod: GY | Performed by: NURSE ANESTHETIST, CERTIFIED REGISTERED

## 2017-02-02 PROCEDURE — 37000008 ZZH ANESTHESIA TECHNICAL FEE, 1ST 30 MIN: Performed by: INTERNAL MEDICINE

## 2017-02-02 PROCEDURE — 82962 GLUCOSE BLOOD TEST: CPT

## 2017-02-02 PROCEDURE — 25000125 ZZHC RX 250: Performed by: NURSE ANESTHETIST, CERTIFIED REGISTERED

## 2017-02-02 PROCEDURE — 25000128 H RX IP 250 OP 636: Performed by: NURSE ANESTHETIST, CERTIFIED REGISTERED

## 2017-02-02 RX ORDER — PROPOFOL 10 MG/ML
INJECTION, EMULSION INTRAVENOUS CONTINUOUS PRN
Status: DISCONTINUED | OUTPATIENT
Start: 2017-02-02 | End: 2017-02-02

## 2017-02-02 RX ORDER — PROPOFOL 10 MG/ML
INJECTION, EMULSION INTRAVENOUS PRN
Status: DISCONTINUED | OUTPATIENT
Start: 2017-02-02 | End: 2017-02-02

## 2017-02-02 RX ORDER — ONDANSETRON 2 MG/ML
INJECTION INTRAMUSCULAR; INTRAVENOUS PRN
Status: DISCONTINUED | OUTPATIENT
Start: 2017-02-02 | End: 2017-02-02

## 2017-02-02 RX ORDER — SODIUM CHLORIDE, SODIUM LACTATE, POTASSIUM CHLORIDE, CALCIUM CHLORIDE 600; 310; 30; 20 MG/100ML; MG/100ML; MG/100ML; MG/100ML
INJECTION, SOLUTION INTRAVENOUS CONTINUOUS PRN
Status: DISCONTINUED | OUTPATIENT
Start: 2017-02-02 | End: 2017-02-02

## 2017-02-02 RX ORDER — SODIUM CHLORIDE 9 MG/ML
INJECTION, SOLUTION INTRAVENOUS CONTINUOUS
Status: DISCONTINUED | OUTPATIENT
Start: 2017-02-02 | End: 2017-02-02 | Stop reason: HOSPADM

## 2017-02-02 RX ORDER — LEVOTHYROXINE SODIUM 75 UG/1
TABLET ORAL
Qty: 90 TABLET | Refills: 0 | OUTPATIENT
Start: 2017-02-02

## 2017-02-02 RX ORDER — LIDOCAINE 40 MG/G
CREAM TOPICAL
Status: DISCONTINUED | OUTPATIENT
Start: 2017-02-02 | End: 2017-02-02 | Stop reason: HOSPADM

## 2017-02-02 RX ORDER — FENTANYL CITRATE 50 UG/ML
INJECTION, SOLUTION INTRAMUSCULAR; INTRAVENOUS PRN
Status: DISCONTINUED | OUTPATIENT
Start: 2017-02-02 | End: 2017-02-02

## 2017-02-02 RX ORDER — GLYCOPYRROLATE 0.2 MG/ML
INJECTION, SOLUTION INTRAMUSCULAR; INTRAVENOUS PRN
Status: DISCONTINUED | OUTPATIENT
Start: 2017-02-02 | End: 2017-02-02

## 2017-02-02 RX ADMIN — PROPOFOL 20 MG: 10 INJECTION, EMULSION INTRAVENOUS at 12:03

## 2017-02-02 RX ADMIN — FENTANYL CITRATE 25 MCG: 50 INJECTION, SOLUTION INTRAMUSCULAR; INTRAVENOUS at 12:09

## 2017-02-02 RX ADMIN — FENTANYL CITRATE 25 MCG: 50 INJECTION, SOLUTION INTRAMUSCULAR; INTRAVENOUS at 12:02

## 2017-02-02 RX ADMIN — MIDAZOLAM HYDROCHLORIDE 1 MG: 1 INJECTION, SOLUTION INTRAMUSCULAR; INTRAVENOUS at 11:52

## 2017-02-02 RX ADMIN — BENZOCAINE 1 EACH: 220 SPRAY, METERED PERIODONTAL at 11:52

## 2017-02-02 RX ADMIN — PROPOFOL 20 MG: 10 INJECTION, EMULSION INTRAVENOUS at 12:09

## 2017-02-02 RX ADMIN — GLYCOPYRROLATE 0.1 MG: 0.2 INJECTION, SOLUTION INTRAMUSCULAR; INTRAVENOUS at 11:52

## 2017-02-02 RX ADMIN — PROPOFOL 150 MCG/KG/MIN: 10 INJECTION, EMULSION INTRAVENOUS at 12:03

## 2017-02-02 RX ADMIN — ONDANSETRON 4 MG: 2 INJECTION INTRAMUSCULAR; INTRAVENOUS at 12:10

## 2017-02-02 RX ADMIN — SODIUM CHLORIDE, POTASSIUM CHLORIDE, SODIUM LACTATE AND CALCIUM CHLORIDE: 600; 310; 30; 20 INJECTION, SOLUTION INTRAVENOUS at 11:52

## 2017-02-02 NOTE — ANESTHESIA POSTPROCEDURE EVALUATION
Patient: Mono Varghese    Procedure(s):  EGD w MAC/DX:hx of large esophageal varies/has prep - Wound Class: II-Clean Contaminated    Diagnosis:EGD w MAC/DX:hx of large esophageal varies/has prep  Diagnosis Additional Information: No value filed.    Anesthesia Type:  No value filed.    Note:  Anesthesia Post Evaluation    Patient location during evaluation: PACU  Patient participation: Able to fully participate in evaluation  Level of consciousness: awake  Pain management: adequate  Airway patency: patent  Cardiovascular status: acceptable  Respiratory status: acceptable  Hydration status: acceptable  PONV: none     Anesthetic complications: None          Last vitals:  Filed Vitals:    02/02/17 1301 02/02/17 1302 02/02/17 1305   BP:      Pulse:      Temp:      Resp: 14 14 15   SpO2:            Electronically Signed By: Steven Hinds MD  February 2, 2017  1:45 PM

## 2017-02-02 NOTE — DISCHARGE INSTRUCTIONS
Discharge Instructions after  Upper Endoscopy (EGD)    Activity and Diet  You were given medicine for pain. You may be dizzy or sleepy.  For 24 hours:    Do not drive or use heavy equipment.    Do not make important decisions.    Do not drink any alcohol.    You may return to your regular diet.    Discomfort  You may have a sore throat for 2 to 3 days. It may help to:    Avoid hot liquids for 24 hours.    Use sore throat lozenges.    Gargle as needed with salt water up to 4 times a day. Mix 1 cup of warm water  with 1 teaspoon of salt. Do not swallow.    Your esophagus was dilated (opened) or banded during the exam:    Drink only cool liquids for the rest of the day. Eat a soft diet for the next few days.    You may have a sore chest for 2 to 3 days.    You may take Tylenol (acetaminophen) for pain unless your doctor has told you not to.    Do not take aspirin or ibuprofen (Advil, Motrin) or other NSAIDS  (anti-inflammatory drugs) for 4 days.    Follow-up    Keep all scheduled appointments        When to call us:  Problems are rare. Call right away if you have:    Unusual throat pain or trouble swallowing    Unusual pain in belly or chest that is not relieved by belching or passing air    Black stools (tar-like looking bowel movement)    Temperature above 100.6  F. (37.5  C).    If you vomit blood or have severe pain, go to an emergency room.    If you have questions, call:  Monday to Friday, 7 a.m. to 4:30 p.m.: Endoscopy: [phone] (We may have to call you back)    After hours: Hospital: [phone] (Ask for the GI fellow on call)

## 2017-02-02 NOTE — OR NURSING
Port de-accessed after heparin flush. Site free from edema, pain or erhythemia.    Aranza Zavaleta RN  [facility] Endoscopy Unit

## 2017-02-02 NOTE — IP AVS SNAPSHOT
MRN:[id number]                      After Visit Summary   2/2/2017    Mono Varghese    MRN: [id number]           Thank you!     Thank you for choosing [facility] for your care. Our goal is always to provide you with excellent care. Hearing back from our patients is one way we can continue to improve our services. Please take a few minutes to complete the written survey that you may receive in the mail after you visit with us. Thank you!        Patient Information     Date Of Birth          1970        About your hospital stay     You were admitted on:  February 2, 2017 You last received care in the:  [facility], Endoscopy    You were discharged on:  February 2, 2017       Who to Call     For medical emergencies, please call 911.  For non-urgent questions about your medical care, please call your primary care provider or clinic, [phone]  For questions related to your surgery, please call your surgery clinic        Attending Provider     Provider    Jordana Ramirez MD       Primary Care Provider Office Phone # Fax #    [place] [phone]       [facility] [address] [place] [id number]        Your next 10 appointments already scheduled     Feb 08, 2017 12:35 PM   (Arrive by 12:20 PM)   Return Visit with [place] MD   [facility] ([facility])    [address] [place] [address] [phone]            Feb 21, 2017 10:00 AM   LAB with  LAB   [facility] Lab ([facility])    [address] [place] [id number] [phone]           Patient must bring picture ID.  Patient should be prepared to give a urine specimen  Please do not eat 10-12 hours before your appointment if you are coming in fasting for labs on lipids, cholesterol, or glucose (sugar).  Pregnant women should follow their Care Team  instructions. Water with medications is okay. Do not drink coffee or other fluids.   If you have concerns about taking  your medications, please ask at office or if scheduling via [facility], send a message by clicking on Secure Messaging, Message Your Care Team.            Feb 21, 2017 11:00 AM   (Arrive by 10:45 AM)   Return General Liver with Elizabet Darling MD   [facility] Hepatology ([facility])    [address]  3rd Floor  [facility] [address]   [phone]            Feb 22, 2017  1:30 PM   Adult Med Follow UP with Kwesi Pena MD   [facility]iatry Clinic ([facility])    [address]  [address] [facility] [address]   [phone]            Mar 01, 2017  9:30 AM   [facility] Lab Draw with  [facility] LAB DRAW   [facility] Lab Draw ([facility])    [address] [place] [address]  [address] [facility] [address]   [phone]            Mar 01, 2017 10:00 AM   (Arrive by 9:45 AM)   MR BRAIN W/O & W CONTRAST with [id number]   [facility] MRI ([facility])    [address]  [address] [facility] [address] [phone]           Take your medicines as usual, unless your doctor tells you not to. Bring a list of your current medicines to your exam (including vitamins, minerals and over-the-counter drugs).  You will be given intravenous contrast for this exam. To prepare:   The day before your exam, drink extra fluids at least six 8-ounce glasses (unless your doctor tells you to restrict your fluids).   Have a blood test (creatinine test) within 30 days of your exam. Go to your clinic or Diagnostic Imaging Department for this test.  The MRI machine uses a strong magnet. Please wear clothes without metal (snaps, zippers). A sweatsuit works well, or we may give you a hospital gown.  Please remove any body piercings and hair extensions before you arrive. You will also  remove watches, jewelry, hairpins, wallets, dentures, partial dental plates and hearing aids. You may wear contact lenses, and you may be able to wear your rings. We have a safe place to keep your personal items, but it is safer to leave them at home.   **IMPORTANT** THE INSTRUCTIONS BELOW ARE ONLY FOR THOSE PATIENTS WHO HAVE BEEN TOLD THEY WILL RECEIVE SEDATION OR GENERAL ANESTHESIA DURING THEIR MRI PROCEDURE:  IF YOU WILL RECEIVE SEDATION (take medicine to help you relax during your exam):   You must get the medicine from your doctor before you arrive. Bring the medicine to the exam. Do not take it at home.   Arrive one hour early. Bring someone who can take you home after the test. Your medicine will make you sleepy. After the exam, you may not drive, take a bus or take a taxi by yourself.   No eating 8 hours before your exam. You may have clear liquids up until 4 hours before your exam. (Clear liquids include water, clear tea, black coffee and fruit juice without pulp.)  IF YOU WILL RECEIVE ANESTHESIA (be asleep for your exam):   Arrive 1 1/2 hours early. Bring someone who can take you home after the test. You may not drive, take a bus or take a taxi by yourself.   No eating 8 hours before your exam. You may have clear liquids up until 4 hours before your exam. (Clear liquids include water, clear tea, black coffee and fruit juice without pulp.)  Please call the Imaging Department at your exam site with any questions.            Mar 06, 2017 10:00 AM   (Arrive by 9:45 AM)   Return Visit with Lauro Shaw MD   [facility] ([facility])    [address] [address] [phone]              Further instructions from your care team       Discharge Instructions after  Upper Endoscopy (EGD)    Activity and Diet  You were given medicine for pain. You may be dizzy or sleepy.  For 24 hours:    Do not drive or use heavy equipment.    Do not make  important decisions.    Do not drink any alcohol.    You may return to your regular diet.    Discomfort  You may have a sore throat for 2 to 3 days. It may help to:    Avoid hot liquids for 24 hours.    Use sore throat lozenges.    Gargle as needed with salt water up to 4 times a day. Mix 1 cup of warm water  with 1 teaspoon of salt. Do not swallow.    Your esophagus was dilated (opened) or banded during the exam:    Drink only cool liquids for the rest of the day. Eat a soft diet for the next few days.    You may have a sore chest for 2 to 3 days.    You may take Tylenol (acetaminophen) for pain unless your doctor has told you not to.    Do not take aspirin or ibuprofen (Advil, Motrin) or other NSAIDS  (anti-inflammatory drugs) for 4 days.    Follow-up    Keep all scheduled appointments        When to call us:  Problems are rare. Call right away if you have:    Unusual throat pain or trouble swallowing    Unusual pain in belly or chest that is not relieved by belching or passing air    Black stools (tar-like looking bowel movement)    Temperature above 100.6  F. (37.5  C).    If you vomit blood or have severe pain, go to an emergency room.    If you have questions, call:  Monday to Friday, 7 a.m. to 4:30 p.m.: Endoscopy: [phone] (We may have to call you back)    After hours: Hospital: [phone] (Ask for the GI fellow on call)    Pending Results     No orders found from 2/1/2017 to 2/3/2017.            Admission Information        Provider Department Dept Phone    2/2/2017 Jordana Ramirez MD [facility] [phone]      Your Vitals Were     Blood Pressure Pulse Temperature Respirations Weight Pulse Oximetry    131/91 mmHg 61 97.6  F (36.4  C) (Oral) 15 90.81 kg (200 lb 3.2 oz) 98%      MyChart Information     [employer] gives you secure access to your electronic health record. If you see a primary care provider, you can also send messages to your care team and make appointments. If you have questions,  please call your primary care clinic.  If you do not have a primary care provider, please call [phone] and they will assist you.        Care EveryWhere ID     This is your Care EveryWhere ID. This could be used by other organizations to access your [facility] medical records  [id number]           Review of your medicines      UNREVIEWED medicines. Ask your doctor about these medicines        Dose / Directions    acetaminophen 500 MG tablet   Commonly known as:  TYLENOL   Used for:  Tension headache        Dose:  500 mg   Take 1 tablet (500 mg) by mouth every 6 hours as needed for mild pain   Quantity:  100 tablet   Refills:  1       Calcium Carb-Cholecalciferol 500-400 MG-UNIT Tabs   Commonly known as:  calcium 500 +D   Used for:  Malnutrition (H)        Dose:  500 mg   Take 500 mg by mouth daily   Quantity:  90 tablet   Refills:  1       cholecalciferol 1000 UNITS capsule   Commonly known as:  vitamin  -D   Used for:  Malnutrition (H)        Dose:  1 capsule   Take 1 capsule (1,000 Units) by mouth daily   Quantity:  60 capsule   Refills:  0       cyanocobalamin 1000 MCG Tabs   Used for:  Alcoholic cirrhosis of liver with ascites (H)        Dose:  1000 mcg   1,000 mcg by Per G Tube route daily   Quantity:  130 tablet   Refills:  1       DILANTIN PO        Dose:  30 mg   Take 30 mg by mouth every morning   Refills:  0       escitalopram 20 MG tablet   Commonly known as:  LEXAPRO   Used for:  Mild episode of recurrent major depressive disorder (H)        Dose:  20 mg   Take 1 tablet (20 mg) by mouth daily   Quantity:  90 tablet   Refills:  3       ferrous sulfate 325 (65 FE) MG tablet   Commonly known as:  IRON   Used for:  Other iron deficiency anemia        Dose:  1 tablet   Take 1 tablet (325 mg) by mouth 2 times daily   Quantity:  200 tablet   Refills:  3       gabapentin 100 MG capsule   Commonly known as:  NEURONTIN   Used for:  Mild episode of recurrent major depressive disorder (H)        Dose:  100 mg    Take 1 capsule (100 mg) by mouth At Bedtime   Quantity:  30 capsule   Refills:  0       glipiZIDE 10 MG 24 hr tablet   Commonly known as:  GLUCOTROL XL   Used for:  DM type 2, goal HbA1c 7%-8% (H)        Dose:  10 mg   Take 1 tablet (10 mg) by mouth daily   Quantity:  30 tablet   Refills:  3       glucose 40 % Gel gel        Dose:  30 g   Take 30 g by mouth every 15 minutes as needed for low blood sugar   Refills:  0       HYDROcodone-acetaminophen 5-325 MG per tablet   Commonly known as:  NORCO   Used for:  Brain tumor (H)        Dose:  2 tablet   Take 2 tablets by mouth every 6 hours as needed for moderate to severe pain   Quantity:  60 tablet   Refills:  0       hydrOXYzine 25 MG tablet   Commonly known as:  ATARAX   Used for:  Itching, Anxiety        Dose:  25 mg   Take 1 tablet (25 mg) by mouth 2 times daily   Quantity:  180 tablet   Refills:  1       hypromellose 0.4 % Soln ophthalmic solution   Commonly known as:  ARTIFICIAL TEARS   Used for:  Presbyopia        Dose:  2 drop   Place 2 drops into both eyes every 8 hours   Quantity:  1 Bottle   Refills:  11       lactulose 20 GM/30ML Soln   Used for:  Hepatic encephalopathy (H)        Dose:  30 mL   Take 30 mLs by mouth every morning   Quantity:  946 mL   Refills:  11       levothyroxine 88 MCG tablet   Commonly known as:  SYNTHROID/LEVOTHROID   Used for:  Hypothyroidism        Dose:  88 mcg   Take 1 tablet (88 mcg) by mouth daily   Quantity:  90 tablet   Refills:  1       melatonin 5 MG tablet        Dose:  5 mg   Take 5 mg by mouth nightly as needed for sleep   Refills:  0       multivitamin, therapeutic with minerals Tabs tablet        Dose:  1 tablet   Take 1 tablet by mouth 2 times daily   Refills:  0       omeprazole 20 MG CR capsule   Commonly known as:  priLOSEC   Used for:  Gastroesophageal reflux disease without esophagitis        Dose:  40 mg   Take 2 capsules (40 mg) by mouth 2 times daily   Quantity:  120 capsule   Refills:  11       pravastatin  80 MG tablet   Commonly known as:  PRAVACHOL   Used for:  High cholesterol        Dose:  80 mg   Take 1 tablet (80 mg) by mouth daily   Quantity:  90 tablet   Refills:  1       sulfamethoxazole-trimethoprim 800-160 MG per tablet   Commonly known as:  BACTRIM DS/SEPTRA DS   Used for:  Spontaneous bacterial peritonitis (H)        Dose:  1 tablet   Take 1 tablet by mouth 2 times daily   Quantity:  60 tablet   Refills:  5       thiamine 100 MG tablet   Used for:  Alcoholic cirrhosis of liver with ascites (H)        Dose:  100 mg   Take 1 tablet (100 mg) by mouth daily   Quantity:  100 tablet   Refills:  1       traZODone 50 MG tablet   Commonly known as:  DESYREL   Used for:  Primary insomnia        Dose:  75 mg   Take 1.5 tablets (75 mg) by mouth nightly as needed for sleep   Quantity:  90 tablet   Refills:  1         CONTINUE these medicines which have NOT CHANGED        Dose / Directions    blood glucose monitoring test strip   Commonly known as:  ONE TOUCH ULTRA   Used for:  Diabetes mellitus, type 2 (H)        Use to test blood sugars 4 times daily as needed or as directed.   Quantity:  300 each   Refills:  0       ONE TOUCH LANCETS Misc   Used for:  Type II or unspecified type diabetes mellitus without mention of complication, not stated as uncontrolled        by In Vitro route 4 times daily as needed   Quantity:  100 each   Refills:  prn                Protect others around you: Learn how to safely use, store and throw away your medicines at www.disposemymeds.org.             Medication List: This is a list of all your medications and when to take them. Check marks below indicate your daily home schedule. Keep this list as a reference.      Medications           Morning Afternoon Evening Bedtime As Needed    acetaminophen 500 MG tablet   Commonly known as:  TYLENOL   Take 1 tablet (500 mg) by mouth every 6 hours as needed for mild pain                                blood glucose monitoring test strip   Commonly  known as:  ONE TOUCH ULTRA   Use to test blood sugars 4 times daily as needed or as directed.                                Calcium Carb-Cholecalciferol 500-400 MG-UNIT Tabs   Commonly known as:  calcium 500 +D   Take 500 mg by mouth daily                                cholecalciferol 1000 UNITS capsule   Commonly known as:  vitamin  -D   Take 1 capsule (1,000 Units) by mouth daily                                cyanocobalamin 1000 MCG Tabs   1,000 mcg by Per G Tube route daily                                DILANTIN PO   Take 30 mg by mouth every morning                                escitalopram 20 MG tablet   Commonly known as:  LEXAPRO   Take 1 tablet (20 mg) by mouth daily                                ferrous sulfate 325 (65 FE) MG tablet   Commonly known as:  IRON   Take 1 tablet (325 mg) by mouth 2 times daily                                gabapentin 100 MG capsule   Commonly known as:  NEURONTIN   Take 1 capsule (100 mg) by mouth At Bedtime                                glipiZIDE 10 MG 24 hr tablet   Commonly known as:  GLUCOTROL XL   Take 1 tablet (10 mg) by mouth daily                                glucose 40 % Gel gel   Take 30 g by mouth every 15 minutes as needed for low blood sugar                                HYDROcodone-acetaminophen 5-325 MG per tablet   Commonly known as:  NORCO   Take 2 tablets by mouth every 6 hours as needed for moderate to severe pain                                hydrOXYzine 25 MG tablet   Commonly known as:  ATARAX   Take 1 tablet (25 mg) by mouth 2 times daily                                hypromellose 0.4 % Soln ophthalmic solution   Commonly known as:  ARTIFICIAL TEARS   Place 2 drops into both eyes every 8 hours                                lactulose 20 GM/30ML Soln   Take 30 mLs by mouth every morning                                levothyroxine 88 MCG tablet   Commonly known as:  SYNTHROID/LEVOTHROID   Take 1 tablet (88 mcg) by mouth daily                                 melatonin 5 MG tablet   Take 5 mg by mouth nightly as needed for sleep                                multivitamin, therapeutic with minerals Tabs tablet   Take 1 tablet by mouth 2 times daily                                omeprazole 20 MG CR capsule   Commonly known as:  priLOSEC   Take 2 capsules (40 mg) by mouth 2 times daily                                ONE TOUCH LANCETS Misc   by In Vitro route 4 times daily as needed                                pravastatin 80 MG tablet   Commonly known as:  PRAVACHOL   Take 1 tablet (80 mg) by mouth daily                                sulfamethoxazole-trimethoprim 800-160 MG per tablet   Commonly known as:  BACTRIM DS/SEPTRA DS   Take 1 tablet by mouth 2 times daily                                thiamine 100 MG tablet   Take 1 tablet (100 mg) by mouth daily                                traZODone 50 MG tablet   Commonly known as:  DESYREL   Take 1.5 tablets (75 mg) by mouth nightly as needed for sleep

## 2017-02-02 NOTE — IP AVS SNAPSHOT
[facility], [place], Endoscopy    [address] [address]    Phone:  [phone]                                       After Visit Summary   2/2/2017    Mono Varghese    MRN: [id number]           After Visit Summary Signature Page     I have received my discharge instructions, and my questions have been answered. I have discussed any challenges I see with this plan with the nurse or doctor.    ..........................................................................................................................................  Patient/Patient Representative Signature      ..........................................................................................................................................  Patient Representative Print Name and Relationship to Patient    ..................................................               ................................................  Date                                            Time    ..........................................................................................................................................  Reviewed by Signature/Title    ...................................................              ..............................................  Date                                                            Time

## 2017-02-02 NOTE — ANESTHESIA CARE TRANSFER NOTE
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    Anesthesia Type:   No value filed.     Note:  Airway :Room Air  Patient transferred to:Phase II  Comments: Report to RN, DOMINIQUE      Vitals: (Last set prior to Anesthesia Care Transfer)    CRNA VITALS  2/2/2017 1148 - 2/2/2017 1221      2/2/2017             Resp Rate (set): 10                Electronically Signed By: Charles Patrick Schlatter, APRN CRNA  February 2, 2017  12:21 PM

## 2017-02-15 DIAGNOSIS — K74.60 CIRRHOSIS OF LIVER WITHOUT ASCITES, UNSPECIFIED HEPATIC CIRRHOSIS TYPE (H): Primary | Chronic | ICD-10-CM

## 2017-02-16 ENCOUNTER — TELEPHONE (OUTPATIENT)
Dept: GASTROENTEROLOGY | Facility: CLINIC | Age: 47
End: 2017-02-16

## 2017-02-16 NOTE — TELEPHONE ENCOUNTER
Left message for pt reminding them of upcoming appointment.  Instructed pt to bring updated medications list.  Instructed pt to arrive an hour and a half to two hours prior to appt time for labs.  Mikey Rowland, CMA

## 2017-02-22 ENCOUNTER — OFFICE VISIT (OUTPATIENT)
Dept: PSYCHIATRY | Facility: CLINIC | Age: 47
End: 2017-02-22
Attending: PSYCHIATRY & NEUROLOGY
Payer: MEDICARE

## 2017-02-22 VITALS
DIASTOLIC BLOOD PRESSURE: 82 MMHG | HEART RATE: 62 BPM | WEIGHT: 203.8 LBS | SYSTOLIC BLOOD PRESSURE: 132 MMHG | BODY MASS INDEX: 29.24 KG/M2

## 2017-02-22 DIAGNOSIS — F33.0 MILD EPISODE OF RECURRENT MAJOR DEPRESSIVE DISORDER (H): ICD-10-CM

## 2017-02-22 PROCEDURE — 99212 OFFICE O/P EST SF 10 MIN: CPT | Mod: ZF

## 2017-02-22 NOTE — MR AVS SNAPSHOT
After Visit Summary   2/22/2017    Mono Varghese    MRN: [id number]           Patient Information     Date Of Birth          1970        Visit Information        Provider Department      2/22/2017 1:30 PM Kwesi Pena MD [facility]        Care Instructions    Therapists in [place] and Surrounding Areas    Farzana Manriquez, PhD, LP                                                   *Adolescents, Adults, Couples & Family  Specializes in PTSD  [place] [address]  [phone]    Sadie Fonseca, PhD,  LP                                          *Individual, 18+ only  Specializes with comorbid medical conditions  [address]  [facility] [address]  [phone]    Kwesi Peraza, PhD                                                   *Individual, 18+ only  [address], [address]  [place], [place] [address]  [phone]    DUKE Owen, MA, LP  [address]  [place] [address]  [phone]    Frank Guillen, PhD  [address]  "[employer]" [facility]  [place] [address]  [phone]    Ed Millan, PhD, LMFT  Individual, Couples & Family  [address] [place] [address]  [phone]    FARNKLYN Freedman, LICSW  Specializes in trauma therapy  [address], [address]  [place], [place] [address] [phone]    KLAUS Macias  Specializes in EMDR and trauma focused CBT  [facility]  [address]  [place] [address]  [phone]    Rosaura Coulter MA, LMFT  Specializes in PTSD  [address]  [place] [address]  [phone]    Adela Romero MA, LP   Adolescents, Adults, Couples & Family      OR     Private Practice  [facility]                                   [address]                                          [place] [address]  [address]                                                  [phone]                              [place] [address]  [phone]    Valeri Johnston, LMFT, LICSW                             Individual, Couples, & Family                        OR    Private Practice  [facility]                                                 [address]  [address]                                             [place] [address]  [facility] [address]                                                             [phone]  [phone]    Shea Burger, MS, LMFT  Individuals, Couples, & Family  [address]  [place] [address] [phone]  [email or web address]    Therapy Clinics in/near [place]    [facility] ([facility])   [phone]    [facility] ([place])   [usually they are full and closed to new pts]  [phone]    [facility] ([facility])   [phone]    [facility]  [phone]     "[facility])    [phone]     [facility]women only[facility] ([facility])   [phone]    [facility] (Multiple Locations)   [phone]    [facility]*  ([place])   [phone]    [facility] ([place])   [phone]    [facility] ([facility])   [phone]    [facility] [facility] ([facility]): [phone]     [Eboni Martinez, [facility] [place], [place], [place], ]    [facility] in [facility] [phone]    [facility]*  ([facility])   [phone]     [facility]                                                                                                       [facility] ([facility])   [phone]    Brigitte (Eboni)   [phone]    [facility]  [phone]    [facility]  Venancio, Emil [phone]    [facility] ([facility])   [phone]    [facility])* ([facility])   [phone]    [facility] in [place]    *Offers sliding fee schedule  ~Accepts Medicare as a sole payer source    Autism eval   Autism Society website. One place that offers them is at [facility], and their information is [address], [place], [place] [address] [phone]      Toni Hansen has a large ketamine clinic- He can be contacted at: [email or web address]  [phone]    GERIATRIC  Onaiza Beverly                          [phone] ([facility])  Nathalie Rogers                           [phone] ([facility])  Tera Draper                             [phone] ([place])                                                 [phone] ([place])  Mitchel Jansen                      [phone] ([facility])  Yumiko Jansen                          [phone] ([facility])  Garcia Anglin MD           [phone] ([place])  Rachel Vazquez                        [phone] ([facility])  Belén Wong RN, [facility]         [phone] ([facility])                                                Follow-ups after your visit        Follow-up notes from your care team     Return in about 2 months (around 4/22/2017).      Your next 10 appointments already scheduled     Mar 01, 2017  9:30 AM CST   Masonic Lab Draw with  MASONIC LAB DRAW   [facility] Mason[facility] Lab Draw ([facility])    [address]  2nd Floor  [facility] [address] [phone]            Mar 01, 2017 10:00 AM CST   (Arrive by 9:45 AM)   MR BRAIN W/O & W CONTRAST with [id number]   [facility] MRI ([facility])    [address]  1st Floor  [facility] [address] [phone]           Take your medicines as usual, unless your doctor tells you not to.  Bring a list of your current medicines to your exam (including vitamins, minerals and over-the-counter drugs).  You will be given intravenous contrast for this exam. To prepare:   The day before your exam, drink extra fluids at least six 8-ounce glasses (unless your doctor tells you to restrict your fluids).   Have a blood test (creatinine test) within 30 days of your exam. Go to your clinic or Diagnostic Imaging Department for this test.  The MRI machine uses a strong magnet. Please wear clothes without metal (snaps, zippers). A sweatsuit works well, or we may give you a hospital gown.  Please remove any body piercings and hair extensions before you arrive. You will also remove watches, jewelry, hairpins, wallets, dentures, partial dental plates and hearing aids. You may wear contact lenses, and you may be able to wear your rings. We have a safe place to keep your personal items, but it is safer to leave them at home.   **IMPORTANT** THE INSTRUCTIONS BELOW ARE ONLY FOR THOSE PATIENTS WHO HAVE BEEN TOLD THEY WILL RECEIVE SEDATION OR GENERAL ANESTHESIA DURING THEIR MRI PROCEDURE:  IF YOU WILL RECEIVE SEDATION (take medicine to help you relax during your exam):   You must get the medicine from your doctor before you arrive. Bring the medicine to the exam. Do not take it at home.   Arrive one hour early. Bring someone who can take you home after the test. Your medicine will make you sleepy. After the exam, you may not drive, take a bus or take a taxi by yourself.   No eating 8 hours before your exam. You may have clear liquids up until 4 hours before your exam. (Clear liquids include water, clear tea, black coffee and fruit juice without pulp.)  IF YOU WILL RECEIVE ANESTHESIA (be asleep for your exam):   Arrive 1 1/2 hours early. Bring someone who can take you home after the test. You may not drive, take a bus or take a taxi by yourself.   No eating 8 hours before your exam. You may have clear liquids up until 4 hours  before your exam. (Clear liquids include water, clear tea, black coffee and fruit juice without pulp.)  Please call the Imaging Department at your exam site with any questions.            Mar 03, 2017  9:05 AM CST   (Arrive by 8:50 AM)   Return Visit with King Louis MD   [facility] ([facility])    [address] [place] [address]  4th Floor  [facility] [address]   [phone]            Mar 06, 2017 10:00 AM CST   (Arrive by 9:45 AM)   Return Visit with Lauro Shaw MD   [facility] ([facility])    [address] [place] [address]  2nd Floor  [facility] [address]   [phone]            Mar 21, 2017 10:00 AM CDT   Lab with  LAB   [facility] Lab ([facility])    [address] [place] [address]  1st Floor  [facility] [address]   [phone]            Mar 21, 2017 11:00 AM CDT   (Arrive by 10:45 AM)   Return General Liver with Elizabet Darling MD   [facility] Hepatology ([facility])    [address] [place] [address]  3rd Floor  [facility] [address]   [phone]            Apr 21, 2017  2:10 PM CDT   Adult Med Follow UP with Kwesi Pena MD   Psychiatry Clinic ([facility])    [place] [address]  [address] [place] [id number] [phone]              Who to contact     Please call your clinic at [phone] to:    Ask questions about your health    Make or cancel appointments    Discuss your medicines    Learn about your test results    Speak to your doctor   If you have compliments or concerns about an experience at your clinic, or if you wish to file a complaint, please contact [facility] Patient Relations at [phone] or email us at [email or web address]         Additional Information About Your Visit        MyChart Information     [employer] gives you secure access to your electronic health record. If you see a  primary care provider, you can also send messages to your care team and make appointments. If you have questions, please call your primary care clinic.  If you do not have a primary care provider, please call [phone] and they will assist you.      [employer] is an electronic gateway that provides easy, online access to your medical records. With [employer], you can request a clinic appointment, read your test results, renew a prescription or communicate with your care team.     To access your existing account, please contact your [facility] or call [phone] for assistance.        Care EveryWhere ID     This is your Care EveryWhere ID. This could be used by other organizations to access your [place] medical records  [id number]        Your Vitals Were     Pulse BMI (Body Mass Index)                62 29.24 kg/m2           Blood Pressure from Last 3 Encounters:   02/22/17 132/82   02/02/17 (!) 118/101   01/31/17 124/89    Weight from Last 3 Encounters:   02/22/17 92.4 kg (203 lb 12.8 oz)   02/02/17 90.8 kg (200 lb 3.2 oz)   01/31/17 90.8 kg (200 lb 1.6 oz)              Today, you had the following     No orders found for display         Today's Medication Changes          These changes are accurate as of: 2/22/17  2:26 PM.  If you have any questions, ask your nurse or doctor.               These medicines have changed or have updated prescriptions.        Dose/Directions    Calcium Carb-Cholecalciferol 500-400 MG-UNIT Tabs   Commonly known as:  calcium 500 +D   This may have changed:  when to take this   Used for:  Malnutrition (H)        Dose:  500 mg   Take 500 mg by mouth daily   Quantity:  90 tablet   Refills:  1       cholecalciferol 1000 UNITS capsule   Commonly known as:  vitamin  -D   This may have changed:  when to take this   Used for:  Malnutrition (H)        Dose:  1 capsule   Take 1 capsule (1,000 Units) by mouth daily   Quantity:  60 capsule   Refills:  0        cyanocobalamin 1000 MCG Tabs   This may have changed:  how to take this   Used for:  Alcoholic cirrhosis of liver with ascites (H)        Dose:  1000 mcg   1,000 mcg by Per G Tube route daily   Quantity:  130 tablet   Refills:  1       escitalopram 20 MG tablet   Commonly known as:  LEXAPRO   This may have changed:  when to take this   Used for:  Mild episode of recurrent major depressive disorder (H)        Dose:  20 mg   Take 1 tablet (20 mg) by mouth daily   Quantity:  90 tablet   Refills:  3       glipiZIDE 10 MG 24 hr tablet   Commonly known as:  GLUCOTROL XL   This may have changed:  when to take this   Used for:  DM type 2, goal HbA1c 7%-8% (H)        Dose:  10 mg   Take 1 tablet (10 mg) by mouth daily   Quantity:  30 tablet   Refills:  3       lactulose 20 GM/30ML Soln   This may have changed:  when to take this   Used for:  Hepatic encephalopathy (H)        Dose:  30 mL   Take 30 mLs by mouth every morning   Quantity:  946 mL   Refills:  11       levothyroxine 88 MCG tablet   Commonly known as:  SYNTHROID/LEVOTHROID   This may have changed:  when to take this   Used for:  Hypothyroidism        Dose:  88 mcg   Take 1 tablet (88 mcg) by mouth daily   Quantity:  90 tablet   Refills:  1       pravastatin 80 MG tablet   Commonly known as:  PRAVACHOL   This may have changed:  when to take this   Used for:  High cholesterol        Dose:  80 mg   Take 1 tablet (80 mg) by mouth daily   Quantity:  90 tablet   Refills:  1       sulfamethoxazole-trimethoprim 800-160 MG per tablet   Commonly known as:  BACTRIM DS/SEPTRA DS   This may have changed:  when to take this   Used for:  Spontaneous bacterial peritonitis (H)        Dose:  1 tablet   Take 1 tablet by mouth 2 times daily   Quantity:  60 tablet   Refills:  5       thiamine 100 MG tablet   This may have changed:  when to take this   Used for:  Alcoholic cirrhosis of liver with ascites (H)        Dose:  100 mg   Take 1 tablet (100 mg) by mouth daily   Quantity:  100  tablet   Refills:  1                Primary Care Provider Office Phone # Fax #    King Louis [phone]       [facility] [address] [place] [address]        Thank you!     Thank you for choosing PS[facility]ATRY CLINIC  for your care. Our goal is always to provide you with excellent care. Hearing back from our patients is one way we can continue to improve our services. Please take a few minutes to complete the written survey that you may receive in the mail after your visit with us. Thank you!             Your Updated Medication List - Protect others around you: Learn how to safely use, store and throw away your medicines at www.disposemymeds.org.          This list is accurate as of: 2/22/17  2:26 PM.  Always use your most recent med list.                   Brand Name Dispense Instructions for use    acetaminophen 500 MG tablet    TYLENOL    100 tablet    Take 1 tablet (500 mg) by mouth every 6 hours as needed for mild pain       blood glucose monitoring test strip    ONE TOUCH ULTRA    300 each    Use to test blood sugars 4 times daily as needed or as directed.       Calcium Carb-Cholecalciferol 500-400 MG-UNIT Tabs    calcium 500 +D    90 tablet    Take 500 mg by mouth daily       cholecalciferol 1000 UNITS capsule    vitamin  -D    60 capsule    Take 1 capsule (1,000 Units) by mouth daily       cyanocobalamin 1000 MCG Tabs     130 tablet    1,000 mcg by Per G Tube route daily       DILANTIN PO      Take 30 mg by mouth every morning       escitalopram 20 MG tablet    LEXAPRO    90 tablet    Take 1 tablet (20 mg) by mouth daily       ferrous sulfate 325 (65 FE) MG tablet    IRON    200 tablet    Take 1 tablet (325 mg) by mouth 2 times daily       gabapentin 100 MG capsule    NEURONTIN    30 capsule    Take 1 capsule (100 mg) by mouth At Bedtime       glipiZIDE 10 MG 24 hr tablet    GLUCOTROL XL    30 tablet    Take 1 tablet (10 mg) by mouth daily       glucose 40 % Gel  gel      Take 30 g by mouth every 15 minutes as needed for low blood sugar       HYDROcodone-acetaminophen 5-325 MG per tablet    NORCO    60 tablet    Take 2 tablets by mouth every 6 hours as needed for moderate to severe pain       hydrOXYzine 25 MG tablet    ATARAX    180 tablet    Take 1 tablet (25 mg) by mouth 2 times daily       hypromellose 0.4 % Soln ophthalmic solution    ARTIFICIAL TEARS    1 Bottle    Place 2 drops into both eyes every 8 hours       lactulose 20 GM/30ML Soln     946 mL    Take 30 mLs by mouth every morning       levothyroxine 88 MCG tablet    SYNTHROID/LEVOTHROID    90 tablet    Take 1 tablet (88 mcg) by mouth daily       melatonin 5 MG tablet      Take 5 mg by mouth nightly as needed for sleep       multivitamin, therapeutic with minerals Tabs tablet      Take 1 tablet by mouth 2 times daily       omeprazole 20 MG CR capsule    priLOSEC    120 capsule    Take 2 capsules (40 mg) by mouth 2 times daily       ONE TOUCH LANCETS Misc     100 each    by In Vitro route 4 times daily as needed       pravastatin 80 MG tablet    PRAVACHOL    90 tablet    Take 1 tablet (80 mg) by mouth daily       sulfamethoxazole-trimethoprim 800-160 MG per tablet    BACTRIM DS/SEPTRA DS    60 tablet    Take 1 tablet by mouth 2 times daily       thiamine 100 MG tablet     100 tablet    Take 1 tablet (100 mg) by mouth daily       traZODone 50 MG tablet    DESYREL    90 tablet    Take 1.5 tablets (75 mg) by mouth nightly as needed for sleep

## 2017-02-22 NOTE — PROGRESS NOTES
"  PSYCHIATRY CLINIC PROGRESS NOTE   30 minute follow-up appointment  The initial DIAG EVAL was 8/13/2014. Date of most recent Transfer Evaluation is 10/05/2016.    INTERIM HISTORY                                                 PSYCH CRITICAL ITEM HISTORY reported trauma history (), legal h/o DUI (2012) and likely h/o Alcohol Use Disorder (currently sober).     Mono Varghese is a 46 year old male who was last seen in clinic on 10/5/16 at which time gabapentin 100 mg QHS was started.  The patient reports good treatment adherence.  History was provided by patient (who is a fair/poor historian) and his wife (who is a good historian).    Since the last visit:  - Reports feeling \"more down.\"  - Sleep has improved since starting gabapentin.  - Often feels tired during the day.  Frequently takes a 1-2 hr nap in the mornings after waking up.  Pt admits to sometimes staying up late into the night playing computer games.  - Has been worrying about his family and his health.  Admits to at times not wanting to take his medications (although has agreed to continue taking them) due to frustrations that his health isn't going to get better. He also becomes frustrated with \"constant medical appointments.\"  - Denies suicidal ideation or thoughts of self harm.  - Per wife, pt has appeared more depressed.  He has not been doing things he enjoys as much (ie playing with the dog less).  He has also been doing less self cares.  He often makes statements such as \"my life sucks\" and repeats it numerous times.  He does not make suicidal statements.  Wife believes his sleep has improved since starting gabapentin.  She often has to remind him to take his medications, because he sometimes forgets to take them.      RECENT SYMPTOMS:   ANXIETY:  nervous/tense and overwhelmed  DEPRESSION:  depressed mood, anhedonia, low energy, poor concentration /memory and indecisiveness;  DENIES- suicidal ideation   EATING DISORDER: " none    SUBSTANCE USE:   ALCOHOL- Rare TOBACCO- none CAFFEINE- occasional soda and coffee   CANNABIS- none  OTHER ILLICIT DRUGS- none     Financial Support- social security disability at this time and dependent on wife (works at [employer] and as a PCA) for additional financial support.   Living Situation- Currently living in a house in [place]. Has been  since 2001 Children- None (note: did have an infant child that passed away)      MEDICAL ROS: Reports nausea, headaches, fatigue Denies muscle twitches, excessive diaphoresis, restlessness and tremor, dry mouth and diarrhea.    PSYCH and CD Critical Summary Points since July 2016           10/5/16:  Started gabapentin 100 mg QHS to target anxiety and sleep disturbances    PAST PSYCH MED TRIALS                                  see EMR Problem List: Hx of psychiatric care    MEDICAL / SURGICAL HISTORY                                   CARE TEAM: PCP- Dr. Louis - [facility]      Oncologist: Dr. Shaw - [facility]     Neurosurgery: Dr. Aranda - [facility]                                     Palliative Care: (not sure of physician name)      Therapist- None currently    Patient Active Problem List   Diagnosis     Type 2 diabetes mellitus (H)     Moderate major depression (H)     LENA (obstructive sleep apnea)-moderate (AHI 16)     Oligoastrocytoma of parietal lobe (H)     ADHD (attention deficit hyperactivity disorder)     Financial difficulties     Thrombocytopenia (H)     Partial epilepsy with impairment of consciousness (H)     Cirrhosis of liver (H)     Pulmonary nodules     Myopic astigmatism of both eyes     Presbyopia     Ringing in ears, unspecified laterality     GIB (gastrointestinal bleeding)     Portal vein thrombosis     Advance care planning     Chronic pain     Hyperlipidemia LDL goal <100     Pancytopenia (H)     Hypothyroidism     Malnutrition (H)     Hx of psychiatric care            ALLERGY                                No clinical screening -  see comments; Tegaderm transparent dressing (informational only); and Latex  MEDICATIONS                               Current Outpatient Prescriptions   Medication Sig Dispense Refill     gabapentin (NEURONTIN) 100 MG capsule Take 1 capsule (100 mg) by mouth At Bedtime 30 capsule 3     [DISCONTINUED] gabapentin (NEURONTIN) 100 MG capsule Take 1 capsule (100 mg) by mouth At Bedtime 30 capsule 0     cholecalciferol (VITAMIN  -D) 1000 UNITS capsule Take 1 capsule (1,000 Units) by mouth daily (Patient taking differently: Take 1 capsule by mouth every morning ) 60 capsule 0     omeprazole (PRILOSEC) 20 MG CR capsule Take 2 capsules (40 mg) by mouth 2 times daily 120 capsule 11     hydrOXYzine (ATARAX) 25 MG tablet Take 1 tablet (25 mg) by mouth 2 times daily 180 tablet 1     glipiZIDE (GLUCOTROL XL) 10 MG 24 hr tablet Take 1 tablet (10 mg) by mouth daily (Patient taking differently: Take 10 mg by mouth every morning ) 30 tablet 3     levothyroxine (SYNTHROID/LEVOTHROID) 88 MCG tablet Take 1 tablet (88 mcg) by mouth daily (Patient taking differently: Take 88 mcg by mouth every morning ) 90 tablet 1     traZODone (DESYREL) 50 MG tablet Take 1.5 tablets (75 mg) by mouth nightly as needed for sleep 90 tablet 1     thiamine 100 MG tablet Take 1 tablet (100 mg) by mouth daily (Patient taking differently: Take 100 mg by mouth every morning ) 100 tablet 1     cyanocobalamin 1000 MCG TABS 1,000 mcg by Per G Tube route daily (Patient taking differently: Take 1,000 mcg by mouth daily ) 130 tablet 1     ferrous sulfate (IRON) 325 (65 FE) MG tablet Take 1 tablet (325 mg) by mouth 2 times daily 200 tablet 3     pravastatin (PRAVACHOL) 80 MG tablet Take 1 tablet (80 mg) by mouth daily (Patient taking differently: Take 80 mg by mouth every morning ) 90 tablet 1     lactulose 20 GM/30ML SOLN Take 30 mLs by mouth every morning (Patient taking differently: Take 30 mLs by mouth every other day ) 946 mL 11     sulfamethoxazole-trimethoprim  "(BACTRIM DS,SEPTRA DS) 800-160 MG per tablet Take 1 tablet by mouth 2 times daily (Patient taking differently: Take 1 tablet by mouth every morning ) 60 tablet 5     escitalopram (LEXAPRO) 20 MG tablet Take 1 tablet (20 mg) by mouth daily (Patient taking differently: Take 20 mg by mouth every morning ) 90 tablet 3     Phenytoin (DILANTIN PO) Take 30 mg by mouth every morning        acetaminophen (TYLENOL) 500 MG tablet Take 1 tablet (500 mg) by mouth every 6 hours as needed for mild pain 100 tablet 1     HYDROcodone-acetaminophen (NORCO) 5-325 MG per tablet Take 2 tablets by mouth every 6 hours as needed for moderate to severe pain 60 tablet 0     Calcium Carb-Cholecalciferol (CALCIUM 500 +D) 500-400 MG-UNIT TABS Take 500 mg by mouth daily (Patient taking differently: Take 500 mg by mouth every morning ) 90 tablet 1     blood glucose monitoring (ONE TOUCH ULTRA) test strip Use to test blood sugars 4 times daily as needed or as directed. 300 each 0     hypromellose (ARTIFICIAL TEARS) 0.4 % SOLN Place 2 drops into both eyes every 8 hours 1 Bottle 11     multivitamin, therapeutic with minerals (THERA-VIT-M) TABS Take 1 tablet by mouth 2 times daily       glucose 40 % GEL Take 30 g by mouth every 15 minutes as needed for low blood sugar       melatonin 5 MG tablet Take 5 mg by mouth nightly as needed for sleep       ONE TOUCH LANCETS MISC by In Vitro route 4 times daily as needed 100 each prn        VITALS   /82  Pulse 62  Wt 92.4 kg (203 lb 12.8 oz)  BMI 29.24 kg/m2   MENTAL STATUS EXAM                                                             Alertness: alert and oriented  Appearance: casually groomed  Behavior/Demeanor: mostly cooperative, with fair eye contact. At times making jokes, but occasionally became somewhat irritated with various questions.  Speech: regular rate and rhythm  Language: intact  Psychomotor: normal or unremarkable  Mood: \"down\"  Affect: restricted; was congruent to mood; was congruent " to content  Thought Process/Associations: unremarkable.  At time would trail off because he forgot what he was talking about.  Thought Content: Denies active/passive suicidal ideation, violent ideation and psychotic thought  Perception: Denies hallucinations  Insight: good  Judgment: good  Cognition: Appears to have some cognitive deficiencies, but formal cognitive testing was not done    LABS and DATA       PHQ9 TODAY = 14  PHQ-9 SCORE 7/20/2016 10/5/2016 2/22/2017   Total Score - - -   Total Score 14 20 14         DIAGNOSIS     MDD, recurrent, mild   Moderate neurocognitive disorder due to another medical condition  Alcohol use disorder in early remission  LENA    h/o ADHD  R/o PTSD     ASSESSMENT                                     Pertinent Background: See most recent Transfer Evaluation as dated above.  Additionally, Psychosocial contributions to presentation include  the re-connection with his biological mother and the discovery that he was a product of incest (2/2014), ongoing decline in functioning secondary to surgical procedure and transitional stressors related to recent move with wife to a new home, history of x30 foster homes prior to being adopted by age 4 yo, the death of his 9 month old daughter in 2006, death of his father-in-law (7/2013), limited social support, relationship stress, and wife reportedly coping with addiction admission for withdrawal 7/2015.     TODAY: Overall, mood is moderately worse since last visit in the context of numerous ongoing stressors. Pt believes gabapentin has been helpful for sleep.  He has been taking Lexapro consistently and denies any side effects.  However, per pt and wife, they are unsure how much Lexapro has been helping.  Pt and wife are interested in a possible medication adjustment.  Given his complex medical picture, informed them this provider would look into some potential medication options and call them within in a week with further recommendations.  They  "agreed with this plan.    In addition, given pt's MH symptoms and numerous significant stressors (including possible \"end of life\" issues), strongly encouraged pt to pursue individual therapy.  Pt agreed to look a list of referrals and consider this option further.         PSYCHOTROPIC DRUG INTERACTIONS:   Concurrent use of ESCITALOPRAM and OMEPRAZOLE may result in increased escitalopram exposure.   Concurrent use of ESCITALOPRAM and TRAZODONE may result in increased risk of serotonin syndrome; increased risk of QT-interval prolongation.   Concurrent use of PHENYTOIN and HYDROXYZINE may result in decreased phenytoin and/ or theophylline exposure.   Concurrent use of LEVOTHYROXINE and ESCITALOPRAM may result in increased levothyroxine requirements.   Concurrent use of PHENYTOIN and TRAZODONE may result in increased phenytoin serum concentrations and an increased risk of phenytoin toxicity (ataxia, hyperreflexia, nystagmus, tremor).   MANAGEMENT: Monitoring for adverse effects, routine vitals, routine labs, periodic EKGs and patient is aware of risks     PLAN                                                                                                       1) PSYCHOTROPIC MEDICATIONS:  - Continue Lexapro 20 mg daily  - Continue trazodone 75 mg QHS  - Continue gabapentin 100 mg QHS     2) THERAPY: Encouraged to start. Pt given list of providers.     3) LABS NEXT DUE: N/A  RATING SCALES: N/A     4) REFERRALS [CD, medical, other]: none     5) : none     6) RTC: 4 weeks (Note: Will plan to call pt and his wife ([phone]) within 1 week to further discuss medication options).     7) CRISIS NUMBERS:  Provided routinely in AVS      TREATMENT RISK STATEMENT:  The risks, benefits, alternatives and potential adverse effects have been discussed and are understood by the patient/ patient's guardian. The pt understands the risks of using street drugs or alcohol.  There are no medical contraindications, the pt " agrees to treatment with the ability to do so.  The patient understands to call 911 or come to the nearest ED if life threatening or urgent symptoms present.      Resident:  Kwesi Pena MD      Staffed in clinic with Dr. Garcia, who will sign the note.  Supervisor is Dr. Magana    Supervisor Attestation:  I have personally examined this patient today and participated in key portions of the patient s care.  I agree with the content of the resident's note.  Noah Garcia MD

## 2017-02-22 NOTE — PATIENT INSTRUCTIONS
Therapists in [place] and Surrounding Areas    Farzana Manriquez, PhD, LP                                                   *Adolescents, Adults, Couples & Family  Specializes in PTSD  [place] [address]  [phone]    Sadie Fonseca, PhD,  LP                                          *Individual, 18+ only  Specializes with comorbid medical conditions  [address]  [facility] [address]  [phone]    Kwesi Peraza, PhD                                                   *Individual, 18+ only  [address], [address]  [place], [place] [address]  [phone]    MARIANA OwenN, MA, LP  [address]  [place] [address]  [phone]    Frank Guillen, PhD  [address]  [employer] [facility]  [place] [address]  [phone]    Ed Millan, , LMFT  Individual, Couples & Family  [address] [place] [address]  [phone]    FRANKLYN Freedman, KLAUS  Specializes in trauma therapy  [address], [address]  [place] [address]  [phone]    KLAUS Macias  Specializes in EMDR and trauma focused CBT  [facility]  [address]  [place] [address]  [phone]    Rosaura Coulter MA, LMFT  Specializes in PTSD  [address] [facility], [address]  [place] [address]  [phone]    Adela Romero MA, LP   Adolescents, Adults, Couples & Family      OR     Private Practice  [facility]                                   [address]                                          [place] [address]  [address] [facility] [facility] [address]                                                 [phone]                              [place], [place] [address] [phone]    NANI Uribe, LICERNST                             Individual, Couples, & Family                        OR    Private Practice  Therapy [address]  [address]  [address]                                             [place] [address]  [facility] [address]                                                             [phone]  [phone]    Shea Burger, MS, LMFT  Individuals, Couples, & Family  [address]  [place], [place] [address] [phone]  [email or web address]    Therapy Clinics in/near [facility] ([facility])   [phone]    [facility] ([place])   [usually they are full and closed to new pts]  [phone]    [facility] ([facility])   [phone]    [facility]  [phone]     [facility])    [phone]     [facility] (women only) ([facility])   [phone]    [facility] (Multiple Locations)   [phone]    [facility]*  ([place])   [phone]    [facility] ([place])   [phone]    [facility] ([facility])   [phone]    [place] [facility] ([facility]): [phone]     [[place], [facility], [place], [place], [place], ]    [facility] in [facility] [phone]    [facility]*  ([facility])   [phone]     [facility]                                                                                                       [facility] ([facility])   [phone]    [facility] ([place])   [phone]    [facility]  [phone]    [facility] [place], [place] [phone]    [facility] ([facility])   [phone]    [facility])* ([facility])   [phone]    [facility] in [place]    *Offers sliding fee  schedule  ~Accepts Medicare as a sole payer source    Autism eval   Autism Society website. One place that offers them is at [facility], and their information is [address], [place] [address]     [phone]      Toni Hansen has a large [facility] clinic- He can be contacted at: [email or web address]  [phone]    CHRISTINE Paul                          [phone] ([facility])  Nathalie Rogers                           [phone] ([facility])  Tera Draper                             [phone] ([facility])                                                 [phone] ([place])  Mitchel Jansen                      [phone] ([facility])  Yumiko Jansen                          [phone] ([facility])  Garcia Anglin MD           [phone] ([place])  Rachel Vazquez                        [phone] ([facility])  Belén Wong RN, [facility]         [phone] ([facility])

## 2017-02-23 ENCOUNTER — TELEPHONE (OUTPATIENT)
Dept: PSYCHIATRY | Facility: CLINIC | Age: 47
End: 2017-02-23

## 2017-02-23 RX ORDER — GABAPENTIN 100 MG/1
100 CAPSULE ORAL AT BEDTIME
Qty: 30 CAPSULE | Refills: 3 | Status: SHIPPED
Start: 2017-02-23 | End: 2017-07-06

## 2017-02-23 NOTE — TELEPHONE ENCOUNTER
On 2/22/2017 the patient signed a PHI authorizing phone messages to be left for him regarding scheduling and medical information.  The form also authorizes Person to Person communication with Yisel Varghese/wife for scheduling and medical information.  I sent this document to scanning on 2/23/2017 and kept a copy in Psychiatry until scanning is complete/confirmed. .Juana Clement/MIYA

## 2017-02-24 ASSESSMENT — PATIENT HEALTH QUESTIONNAIRE - PHQ9: SUM OF ALL RESPONSES TO PHQ QUESTIONS 1-9: 14

## 2017-02-24 NOTE — TELEPHONE ENCOUNTER
Dr. Pena sent RF today at 0830.  Called pharmacy staff who confirm they rec'd this but insurance won't allow it to be filled until 2/25/17.

## 2017-02-24 NOTE — TELEPHONE ENCOUNTER
Pt Care  Received: Today       Mayela Whelan RN  Robby, Deirdre MCKEON RN       Phone Number: [phone] (Call me)                       Previous Messages       ----- Message -----      From: Saurav May      Sent: 2/23/2017   7:22 AM        To: Yisel Peterson RN   Subject: Pt Care                                           Vonda from [facility] call [facility] 2/22/17@7:33pm regarding refill request for Gabapentin. Pharmacy asked if the nurse or provider Jean could please call in refill Gabapentin 100mg quantity of 30.

## 2017-02-27 DIAGNOSIS — K74.60 HEPATIC CIRRHOSIS, UNSPECIFIED HEPATIC CIRRHOSIS TYPE (H): Primary | Chronic | ICD-10-CM

## 2017-03-01 DIAGNOSIS — C71.3 OLIGOASTROCYTOMA OF PARIETAL LOBE (H): ICD-10-CM

## 2017-03-01 DIAGNOSIS — K74.60 CIRRHOSIS OF LIVER WITHOUT ASCITES, UNSPECIFIED HEPATIC CIRRHOSIS TYPE (H): Chronic | ICD-10-CM

## 2017-03-01 LAB
ALBUMIN SERPL-MCNC: 3.4 G/DL (ref 3.4–5)
ALP SERPL-CCNC: 196 U/L (ref 40–150)
ALT SERPL W P-5'-P-CCNC: 52 U/L (ref 0–70)
ANION GAP SERPL CALCULATED.3IONS-SCNC: 8 MMOL/L (ref 3–14)
AST SERPL W P-5'-P-CCNC: 52 U/L (ref 0–45)
BASOPHILS # BLD AUTO: 0 10E9/L (ref 0–0.2)
BASOPHILS NFR BLD AUTO: 0.7 %
BILIRUB DIRECT SERPL-MCNC: 0.4 MG/DL (ref 0–0.2)
BILIRUB SERPL-MCNC: 0.7 MG/DL (ref 0.2–1.3)
BUN SERPL-MCNC: 11 MG/DL (ref 7–30)
CALCIUM SERPL-MCNC: 8.6 MG/DL (ref 8.5–10.1)
CHLORIDE SERPL-SCNC: 107 MMOL/L (ref 94–109)
CO2 SERPL-SCNC: 25 MMOL/L (ref 20–32)
CREAT SERPL-MCNC: 0.85 MG/DL (ref 0.66–1.25)
DIFFERENTIAL METHOD BLD: ABNORMAL
EOSINOPHIL # BLD AUTO: 0.1 10E9/L (ref 0–0.7)
EOSINOPHIL NFR BLD AUTO: 8.2 %
ERYTHROCYTE [DISTWIDTH] IN BLOOD BY AUTOMATED COUNT: 14 % (ref 10–15)
GFR SERPL CREATININE-BSD FRML MDRD: ABNORMAL ML/MIN/1.7M2
GLUCOSE SERPL-MCNC: 176 MG/DL (ref 70–99)
HCT VFR BLD AUTO: 36.4 % (ref 40–53)
HGB BLD-MCNC: 12.3 G/DL (ref 13.3–17.7)
IMM GRANULOCYTES # BLD: 0 10E9/L (ref 0–0.4)
IMM GRANULOCYTES NFR BLD: 0.7 %
INR PPP: 1.26 (ref 0.86–1.14)
LYMPHOCYTES # BLD AUTO: 0.3 10E9/L (ref 0.8–5.3)
LYMPHOCYTES NFR BLD AUTO: 21.8 %
MCH RBC QN AUTO: 32.5 PG (ref 26.5–33)
MCHC RBC AUTO-ENTMCNC: 33.8 G/DL (ref 31.5–36.5)
MCV RBC AUTO: 96 FL (ref 78–100)
MONOCYTES # BLD AUTO: 0.1 10E9/L (ref 0–1.3)
MONOCYTES NFR BLD AUTO: 9.5 %
NEUTROPHILS # BLD AUTO: 0.9 10E9/L (ref 1.6–8.3)
NEUTROPHILS NFR BLD AUTO: 59.1 %
NRBC # BLD AUTO: 0 10*3/UL
NRBC BLD AUTO-RTO: 0 /100
PLATELET # BLD AUTO: 39 10E9/L (ref 150–450)
POTASSIUM SERPL-SCNC: 4.2 MMOL/L (ref 3.4–5.3)
PROT SERPL-MCNC: 6.9 G/DL (ref 6.8–8.8)
RBC # BLD AUTO: 3.79 10E12/L (ref 4.4–5.9)
SODIUM SERPL-SCNC: 139 MMOL/L (ref 133–144)
WBC # BLD AUTO: 1.5 10E9/L (ref 4–11)

## 2017-03-01 PROCEDURE — 85610 PROTHROMBIN TIME: CPT | Performed by: INTERNAL MEDICINE

## 2017-03-01 PROCEDURE — 82248 BILIRUBIN DIRECT: CPT | Performed by: INTERNAL MEDICINE

## 2017-03-01 PROCEDURE — 85025 COMPLETE CBC W/AUTO DIFF WBC: CPT | Performed by: INTERNAL MEDICINE

## 2017-03-01 PROCEDURE — 80053 COMPREHEN METABOLIC PANEL: CPT | Performed by: INTERNAL MEDICINE

## 2017-03-01 NOTE — NURSING NOTE
Chief Complaint   Patient presents with     Blood Draw     Pt with hx of Oligoastrocytoma of parietal lobe labs collected via PIV placed during MRI.

## 2017-03-03 ENCOUNTER — OFFICE VISIT (OUTPATIENT)
Dept: FAMILY MEDICINE | Facility: CLINIC | Age: 47
End: 2017-03-03

## 2017-03-03 VITALS
HEART RATE: 66 BPM | DIASTOLIC BLOOD PRESSURE: 76 MMHG | RESPIRATION RATE: 16 BRPM | WEIGHT: 202.9 LBS | SYSTOLIC BLOOD PRESSURE: 118 MMHG | BODY MASS INDEX: 29.11 KG/M2

## 2017-03-03 DIAGNOSIS — E11.9 DM TYPE 2, GOAL HBA1C 7%-8% (H): Primary | ICD-10-CM

## 2017-03-03 DIAGNOSIS — E11.9 DM TYPE 2, GOAL HBA1C 7%-8% (H): ICD-10-CM

## 2017-03-03 LAB
HBA1C MFR BLD: 6.6 % (ref 4.3–6)
T4 FREE SERPL-MCNC: 1.06 NG/DL (ref 0.76–1.46)
TSH SERPL DL<=0.005 MIU/L-ACNC: 6.96 MU/L (ref 0.4–4)

## 2017-03-03 ASSESSMENT — PAIN SCALES - GENERAL: PAINLEVEL: NO PAIN (0)

## 2017-03-03 NOTE — PATIENT INSTRUCTIONS
Primary Care Center Medication Refill Request Information:  * Please contact your pharmacy regarding ANY request for medication refills.  ** [facility] Prescription Fax = [phone]  * Please allow 3 business days for routine medication refills.  * Please allow 5 business days for controlled substance medication refills.     Primary Care Center Test Result notification information:  *You will be notified with in 7-10 days of your appointment day regarding the results of your test.  If you are on MyChart you will be notified as soon as the provider has reviewed the results and signed off on them.    Primary Care Center   [facility]  [address] [place] [address] (4th Floor )   [place] [address] [phone]  [phone]

## 2017-03-03 NOTE — NURSING NOTE
Chief Complaint   Patient presents with     Medication Request     pt would like to discuss medications     Whitney Garcia CMA at 9:21 AM on 3/3/2017.

## 2017-03-03 NOTE — PROGRESS NOTES
SUBJECTIVE:    Pt is a 46 year old male with pmh of     Patient Active Problem List   Diagnosis     Type 2 diabetes mellitus (H)     Moderate major depression (H)     LENA (obstructive sleep apnea)-moderate (AHI 16)     Oligoastrocytoma of parietal lobe (H)     ADHD (attention deficit hyperactivity disorder)     Financial difficulties     Thrombocytopenia (H)     Partial epilepsy with impairment of consciousness (H)     Cirrhosis of liver (H)     Pulmonary nodules     Myopic astigmatism of both eyes     Presbyopia     Ringing in ears, unspecified laterality     GIB (gastrointestinal bleeding)     Portal vein thrombosis     Advance care planning     Chronic pain     Hyperlipidemia LDL goal <100     Pancytopenia (H)     Hypothyroidism     Malnutrition (H)     Hx of psychiatric care       who is here for evaluation of had concerns including Medication Request.    Here for a few things.  Here w/ wife, in room whole time.  Just did brain MR, sees onco Monday, I did rvw it w/ them, stable; partly we did that as he mentions new issue, lightheaded. When gets up, not often, he states a few times a week. BP in normal range; low normal at times at home, but not below 100 SBP, and no falls, but sometimes has to catch himself.  Due for labs for Dm and hypothyroid.  Per wife, sugars better on higher dose glipizide, tolerated.  Wt stable.  Goes for varices banding every two mo now.  Allergies   Allergen Reactions     No Clinical Screening - See Comments      Coban and Surgilast cause itching     Tegaderm Transparent Dressing (Informational Only)      Latex Itching and Rash             Current Outpatient Prescriptions   Medication Sig Dispense Refill     gabapentin (NEURONTIN) 100 MG capsule Take 1 capsule (100 mg) by mouth At Bedtime 30 capsule 3     cholecalciferol (VITAMIN  -D) 1000 UNITS capsule Take 1 capsule (1,000 Units) by mouth daily (Patient taking differently: Take 1 capsule by mouth every morning ) 60 capsule 0      omeprazole (PRILOSEC) 20 MG CR capsule Take 2 capsules (40 mg) by mouth 2 times daily 120 capsule 11     hydrOXYzine (ATARAX) 25 MG tablet Take 1 tablet (25 mg) by mouth 2 times daily 180 tablet 1     glipiZIDE (GLUCOTROL XL) 10 MG 24 hr tablet Take 1 tablet (10 mg) by mouth daily (Patient taking differently: Take 10 mg by mouth every morning ) 30 tablet 3     levothyroxine (SYNTHROID/LEVOTHROID) 88 MCG tablet Take 1 tablet (88 mcg) by mouth daily (Patient taking differently: Take 88 mcg by mouth every morning ) 90 tablet 1     traZODone (DESYREL) 50 MG tablet Take 1.5 tablets (75 mg) by mouth nightly as needed for sleep 90 tablet 1     thiamine 100 MG tablet Take 1 tablet (100 mg) by mouth daily (Patient taking differently: Take 100 mg by mouth every morning ) 100 tablet 1     cyanocobalamin 1000 MCG TABS 1,000 mcg by Per G Tube route daily (Patient taking differently: Take 1,000 mcg by mouth daily ) 130 tablet 1     ferrous sulfate (IRON) 325 (65 FE) MG tablet Take 1 tablet (325 mg) by mouth 2 times daily 200 tablet 3     pravastatin (PRAVACHOL) 80 MG tablet Take 1 tablet (80 mg) by mouth daily (Patient taking differently: Take 80 mg by mouth every morning ) 90 tablet 1     lactulose 20 GM/30ML SOLN Take 30 mLs by mouth every morning (Patient taking differently: Take 30 mLs by mouth every other day ) 946 mL 11     sulfamethoxazole-trimethoprim (BACTRIM DS,SEPTRA DS) 800-160 MG per tablet Take 1 tablet by mouth 2 times daily (Patient taking differently: Take 1 tablet by mouth every morning ) 60 tablet 5     escitalopram (LEXAPRO) 20 MG tablet Take 1 tablet (20 mg) by mouth daily (Patient taking differently: Take 20 mg by mouth every morning ) 90 tablet 3     Phenytoin (DILANTIN PO) Take 30 mg by mouth every morning        acetaminophen (TYLENOL) 500 MG tablet Take 1 tablet (500 mg) by mouth every 6 hours as needed for mild pain 100 tablet 1     HYDROcodone-acetaminophen (NORCO) 5-325 MG per tablet Take 2 tablets by  mouth every 6 hours as needed for moderate to severe pain 60 tablet 0     Calcium Carb-Cholecalciferol (CALCIUM 500 +D) 500-400 MG-UNIT TABS Take 500 mg by mouth daily (Patient taking differently: Take 500 mg by mouth every morning ) 90 tablet 1     blood glucose monitoring (ONE TOUCH ULTRA) test strip Use to test blood sugars 4 times daily as needed or as directed. 300 each 0     hypromellose (ARTIFICIAL TEARS) 0.4 % SOLN Place 2 drops into both eyes every 8 hours 1 Bottle 11     multivitamin, therapeutic with minerals (THERA-VIT-M) TABS Take 1 tablet by mouth 2 times daily       glucose 40 % GEL Take 30 g by mouth every 15 minutes as needed for low blood sugar       melatonin 5 MG tablet Take 5 mg by mouth nightly as needed for sleep       ONE TOUCH LANCETS MISC by In Vitro route 4 times daily as needed 100 each prn       Social History   Substance Use Topics     Smoking status: Never Smoker     Smokeless tobacco: Never Used     Alcohol use No      Comment: Quit 01/2014           OBJECTIVE:  /76 (BP Location: Right arm, Patient Position: Chair, Cuff Size: Adult Large)  Pulse 66  Resp 16  Wt 92 kg (202 lb 14.4 oz)  BMI 29.11 kg/m2  GENERAL APPEARANCE: Alert, no acute distress  RESP: lungs clear to auscultation   CV: normal rate, regular rhythm, no murmur or gallop  NEURO: Alert, oriented, speech and mentation normal  PSYCHE: mentation appears normal, affect and mood normal    ASSESSMENT/PLAN:    DM 2: lab  Lightheaded: brain MR stable. He states notes, infrequently, when gets up, but, that it is a spinning sensation. Discussed could get ENT opinion; he and his wife want to push fluids and see if that helps. Not clearly linked to when he takes any of his meds.  Hypothyroid: lab  See me in a mo      KAYODE LANGSTON MD

## 2017-03-03 NOTE — MR AVS SNAPSHOT
After Visit Summary   3/3/2017    Mono Varghese    MRN: [id number]           Patient Information     Date Of Birth          1970        Visit Information        Provider Department      3/3/2017 9:05 AM King Louis MD [facility]        Today's Diagnoses     DM type 2, goal HbA1c 7%-8% (H)    -  1      Care Instructions    Primary Care Center Medication Refill Request Information:  * Please contact your pharmacy regarding ANY request for medication refills.  ** [facility] Prescription Fax = [phone]  * Please allow 3 business days for routine medication refills.  * Please allow 5 business days for controlled substance medication refills.     Primary Care Center Test Result notification information:  *You will be notified with in 7-10 days of your appointment day regarding the results of your test.  If you are on MyChart you will be notified as soon as the provider has reviewed the results and signed off on them.    Primary Care Center   [facility]  [address] [place] [address] (4th Floor )   [place] [address] [phone]  [phone]          Follow-ups after your visit        Follow-up notes from your care team     Return in about 4 weeks (around 3/31/2017).      Your next 10 appointments already scheduled     Mar 03, 2017 10:45 AM CST   LAB with  LAB   [facility] ([facility])    [address] [place] [id number] [phone]           Patient must bring picture ID.  Patient should be prepared to give a urine specimen  Please do not eat 10-12 hours before your appointment if you are coming in fasting for labs on lipids, cholesterol, or glucose (sugar).  Pregnant women should follow their Care Team instructions. Water with medications is okay. Do not drink coffee or other fluids.   If you have concerns about taking  your medications, please ask at office or if scheduling via [employer], send a message by clicking on Secure  Messaging, Message Your Care Team.            Mar 06, 2017 10:00 AM CST   (Arrive by 9:45 AM)   Return Visit with Lauro Shaw MD   [facility] ([facility])    [address]  2nd Floor  [facility] [address]   [phone]            Mar 21, 2017 10:00 AM CDT   Lab with  LAB   [facility] ([facility])    [address]  1st Floor  [facility] [address]   [phone]            Mar 21, 2017 11:00 AM CDT   (Arrive by 10:45 AM)   Return General Liver with Elizabet Darling MD   [facility] ([facility])    [address]  3rd Floor  [facility] [facility] [address] [phone]            Mar 30, 2017   Procedure with Jordana Ramirez MD   [facility] [place] ([facility])    [address]  [facility] [place] [address] [phone]           The [facility] is located on the corner of [address] and [address] on the [address]. It is easily accessible from virtually any point in the Health system area, via [address] and [address].            Apr 05, 2017 10:35 AM CDT   (Arrive by 10:20 AM)   Return Visit with King Louis MD   [facility] ([facility])    [address]  4th Floor  [facility] [id number] [phone]              Future tests that were ordered for you today     Open Future Orders        Priority Expected Expires Ordered    Hemoglobin A1c Routine 3/3/2017 3/17/2017 3/3/2017    TSH with free T4 reflex Routine 3/3/2017 3/17/2017 3/3/2017            Who to contact     Please call your clinic at [phone] to:    Ask questions about your health    Make or cancel appointments    Discuss your medicines    Learn about your test results    Speak to your doctor   If you have compliments or concerns about an experience at your clinic, or if you  wish to file a complaint, please contact [facility] Patient Relations at [phone] or email us at [email or web address]         Additional Information About Your Visit        [employer] Information     [employer] gives you secure access to your electronic health record. If you see a primary care provider, you can also send messages to your care team and make appointments. If you have questions, please call your primary care clinic.  If you do not have a primary care provider, please call [phone] and they will assist you.      [employer] is an electronic gateway that provides easy, online access to your medical records. With [employer], you can request a clinic appointment, read your test results, renew a prescription or communicate with your care team.     To access your existing account, please contact your [facility] or call [phone] for assistance.        Care EveryWhere ID     This is your Care EveryWhere ID. This could be used by other organizations to access your [place] medical records  [id number]        Your Vitals Were     Pulse Respirations BMI (Body Mass Index)             66 16 29.11 kg/m2          Blood Pressure from Last 3 Encounters:   03/03/17 118/76   02/02/17 (!) 118/101   01/31/17 124/89    Weight from Last 3 Encounters:   03/03/17 92 kg (202 lb 14.4 oz)   02/02/17 90.8 kg (200 lb 3.2 oz)   01/31/17 90.8 kg (200 lb 1.6 oz)                 Today's Medication Changes          These changes are accurate as of: 3/3/17 10:24 AM.  If you have any questions, ask your nurse or doctor.               These medicines have changed or have updated prescriptions.        Dose/Directions    Calcium Carb-Cholecalciferol 500-400 MG-UNIT Tabs   Commonly known as:  calcium 500 +D   This may have changed:  when to take this   Used for:  Malnutrition (H)        Dose:  500 mg   Take 500 mg by mouth daily   Quantity:  90 tablet   Refills:  1        cholecalciferol 1000 UNITS capsule   Commonly known as:  vitamin  -D   This may have changed:  when to take this   Used for:  Malnutrition (H)        Dose:  1 capsule   Take 1 capsule (1,000 Units) by mouth daily   Quantity:  60 capsule   Refills:  0       cyanocobalamin 1000 MCG Tabs   This may have changed:  how to take this   Used for:  Alcoholic cirrhosis of liver with ascites (H)        Dose:  1000 mcg   1,000 mcg by Per G Tube route daily   Quantity:  130 tablet   Refills:  1       escitalopram 20 MG tablet   Commonly known as:  LEXAPRO   This may have changed:  when to take this   Used for:  Mild episode of recurrent major depressive disorder (H)        Dose:  20 mg   Take 1 tablet (20 mg) by mouth daily   Quantity:  90 tablet   Refills:  3       glipiZIDE 10 MG 24 hr tablet   Commonly known as:  GLUCOTROL XL   This may have changed:  when to take this   Used for:  DM type 2, goal HbA1c 7%-8% (H)        Dose:  10 mg   Take 1 tablet (10 mg) by mouth daily   Quantity:  30 tablet   Refills:  3       lactulose 20 GM/30ML Soln   This may have changed:  when to take this   Used for:  Hepatic encephalopathy (H)        Dose:  30 mL   Take 30 mLs by mouth every morning   Quantity:  946 mL   Refills:  11       levothyroxine 88 MCG tablet   Commonly known as:  SYNTHROID/LEVOTHROID   This may have changed:  when to take this   Used for:  Hypothyroidism        Dose:  88 mcg   Take 1 tablet (88 mcg) by mouth daily   Quantity:  90 tablet   Refills:  1       pravastatin 80 MG tablet   Commonly known as:  PRAVACHOL   This may have changed:  when to take this   Used for:  High cholesterol        Dose:  80 mg   Take 1 tablet (80 mg) by mouth daily   Quantity:  90 tablet   Refills:  1       sulfamethoxazole-trimethoprim 800-160 MG per tablet   Commonly known as:  BACTRIM DS/SEPTRA DS   This may have changed:  when to take this   Used for:  Spontaneous bacterial peritonitis (H)        Dose:  1 tablet   Take 1 tablet by mouth 2  times daily   Quantity:  60 tablet   Refills:  5       thiamine 100 MG tablet   This may have changed:  when to take this   Used for:  Alcoholic cirrhosis of liver with ascites (H)        Dose:  100 mg   Take 1 tablet (100 mg) by mouth daily   Quantity:  100 tablet   Refills:  1                Primary Care Provider Office Phone # Fax #    King Louis [phone]       PRIMARY CARE CENTER [address] [place] [phone]        Thank you!     Thank you for choosing [facility]  for your care. Our goal is always to provide you with excellent care. Hearing back from our patients is one way we can continue to improve our services. Please take a few minutes to complete the written survey that you may receive in the mail after your visit with us. Thank you!             Your Updated Medication List - Protect others around you: Learn how to safely use, store and throw away your medicines at www.disposemymeds.org.          This list is accurate as of: 3/3/17 10:24 AM.  Always use your most recent med list.                   Brand Name Dispense Instructions for use    acetaminophen 500 MG tablet    TYLENOL    100 tablet    Take 1 tablet (500 mg) by mouth every 6 hours as needed for mild pain       blood glucose monitoring test strip    ONE TOUCH ULTRA    300 each    Use to test blood sugars 4 times daily as needed or as directed.       Calcium Carb-Cholecalciferol 500-400 MG-UNIT Tabs    calcium 500 +D    90 tablet    Take 500 mg by mouth daily       cholecalciferol 1000 UNITS capsule    vitamin  -D    60 capsule    Take 1 capsule (1,000 Units) by mouth daily       cyanocobalamin 1000 MCG Tabs     130 tablet    1,000 mcg by Per G Tube route daily       DILANTIN PO      Take 30 mg by mouth every morning       escitalopram 20 MG tablet    LEXAPRO    90 tablet    Take 1 tablet (20 mg) by mouth daily       ferrous sulfate 325 (65 FE) MG tablet    IRON    200 tablet    Take 1  tablet (325 mg) by mouth 2 times daily       gabapentin 100 MG capsule    NEURONTIN    30 capsule    Take 1 capsule (100 mg) by mouth At Bedtime       glipiZIDE 10 MG 24 hr tablet    GLUCOTROL XL    30 tablet    Take 1 tablet (10 mg) by mouth daily       glucose 40 % Gel gel      Take 30 g by mouth every 15 minutes as needed for low blood sugar       HYDROcodone-acetaminophen 5-325 MG per tablet    NORCO    60 tablet    Take 2 tablets by mouth every 6 hours as needed for moderate to severe pain       hydrOXYzine 25 MG tablet    ATARAX    180 tablet    Take 1 tablet (25 mg) by mouth 2 times daily       hypromellose 0.4 % Soln ophthalmic solution    ARTIFICIAL TEARS    1 Bottle    Place 2 drops into both eyes every 8 hours       lactulose 20 GM/30ML Soln     946 mL    Take 30 mLs by mouth every morning       levothyroxine 88 MCG tablet    SYNTHROID/LEVOTHROID    90 tablet    Take 1 tablet (88 mcg) by mouth daily       melatonin 5 MG tablet      Take 5 mg by mouth nightly as needed for sleep       multivitamin, therapeutic with minerals Tabs tablet      Take 1 tablet by mouth 2 times daily       omeprazole 20 MG CR capsule    priLOSEC    120 capsule    Take 2 capsules (40 mg) by mouth 2 times daily       ONE TOUCH LANCETS Misc     100 each    by In Vitro route 4 times daily as needed       pravastatin 80 MG tablet    PRAVACHOL    90 tablet    Take 1 tablet (80 mg) by mouth daily       sulfamethoxazole-trimethoprim 800-160 MG per tablet    BACTRIM DS/SEPTRA DS    60 tablet    Take 1 tablet by mouth 2 times daily       thiamine 100 MG tablet     100 tablet    Take 1 tablet (100 mg) by mouth daily       traZODone 50 MG tablet    DESYREL    90 tablet    Take 1.5 tablets (75 mg) by mouth nightly as needed for sleep

## 2017-03-06 ENCOUNTER — TELEPHONE (OUTPATIENT)
Dept: PSYCHIATRY | Facility: CLINIC | Age: 47
End: 2017-03-06

## 2017-03-06 DIAGNOSIS — F33.1 MODERATE EPISODE OF RECURRENT MAJOR DEPRESSIVE DISORDER (H): Primary | ICD-10-CM

## 2017-03-06 RX ORDER — MIRTAZAPINE 15 MG/1
TABLET, FILM COATED ORAL
Qty: 30 TABLET | Refills: 2 | Status: SHIPPED
Start: 2017-03-06 | End: 2017-06-09

## 2017-03-09 ENCOUNTER — TELEPHONE (OUTPATIENT)
Dept: GASTROENTEROLOGY | Facility: CLINIC | Age: 47
End: 2017-03-09

## 2017-03-13 ENCOUNTER — MYC REFILL (OUTPATIENT)
Dept: FAMILY MEDICINE | Facility: CLINIC | Age: 47
End: 2017-03-13

## 2017-03-13 DIAGNOSIS — F51.01 PRIMARY INSOMNIA: ICD-10-CM

## 2017-03-13 DIAGNOSIS — K21.9 GASTROESOPHAGEAL REFLUX DISEASE WITHOUT ESOPHAGITIS: ICD-10-CM

## 2017-03-13 DIAGNOSIS — E03.9 HYPOTHYROIDISM: ICD-10-CM

## 2017-03-13 RX ORDER — TRAZODONE HYDROCHLORIDE 50 MG/1
75 TABLET, FILM COATED ORAL
Qty: 90 TABLET | Refills: 1 | Status: CANCELLED | OUTPATIENT
Start: 2017-03-13

## 2017-03-14 RX ORDER — LEVOTHYROXINE SODIUM 88 UG/1
88 TABLET ORAL DAILY
Qty: 90 TABLET | Refills: 3 | Status: SHIPPED | OUTPATIENT
Start: 2017-03-14 | End: 2018-04-12

## 2017-03-14 RX ORDER — TRAZODONE HYDROCHLORIDE 50 MG/1
75 TABLET, FILM COATED ORAL
Qty: 135 TABLET | Refills: 1 | Status: SHIPPED | OUTPATIENT
Start: 2017-03-14 | End: 2017-06-29

## 2017-03-14 NOTE — TELEPHONE ENCOUNTER
traZODone (DESYREL) 50 MG      Last Written Prescription Date:  11/16/16  Last Fill Quantity: 90   # refills: 1  Last Office Visit : 3/3/17  Future Office visit:  4/5/17

## 2017-03-14 NOTE — TELEPHONE ENCOUNTER
levothyroxine (SYNTHROID/LEVOTHROID) 88 MCG tablet  Last Written Prescription Date:  12/9/16  Last Fill Quantity: 90,   # refills: 1  Last Office Visit with G, P or [facility] prescribing provider: 3/3/17  Future Office visit:   4/5/17    TSH   Date Value Ref Range Status   03/03/2017 6.96 (H) 0.40 - 4.00 mU/L Final   ]      Routing refill request to provider for review/approval because:   levothyroxine (SYNTHROID/LEVOTHROID) 88 MCG tablet.. tsh H

## 2017-03-21 ENCOUNTER — TELEPHONE (OUTPATIENT)
Dept: GASTROENTEROLOGY | Facility: CLINIC | Age: 47
End: 2017-03-21

## 2017-03-21 DIAGNOSIS — I85.00 ESOPHAGEAL VARICES (H): Primary | ICD-10-CM

## 2017-03-21 NOTE — TELEPHONE ENCOUNTER
----- Message from Steff Fitzgerald LPN sent at 3/21/2017  9:52 AM CDT -----  Regarding: FW: Dr Ramirez/Lisset Pt- Needs order changed for EGD  Contact: [phone]      ----- Message -----     From: Tali Abad     Sent: 3/21/2017   9:47 AM       To: Hepatology Nurses-  Subject: Dr Ramirez/Lisset Pt- Needs order changed #    Hello,     Pt's wife, Yisel, called stating procedure to be done by Dr Ramirez on 03/30 is going to require anesthesia (based on past procedures) and when pt called to schedule pre-op physical with PCP, they advised one wasn't needed as the pt was not going to have anesthesia. Pt's wife spoke with unit [facility] at hospital and they advised Dr Ramirez needs to rewrite the order for the procedure to include this. Pt's wife, Yisel, is requesting a call at [phone] regarding this and okay to LM.     Thank you!     Tali   Call Center     Please DO NOT send this message and/or reply back to sender.  Call Center Representatives DO NOT respond to messages.

## 2017-03-21 NOTE — TELEPHONE ENCOUNTER
I left a detailed message stating that orders have been placed for an EGD under MAC and she can feel free to schedule the pre-op.

## 2017-03-30 ENCOUNTER — TELEPHONE (OUTPATIENT)
Dept: INTERNAL MEDICINE | Facility: CLINIC | Age: 47
End: 2017-03-30

## 2017-03-30 ENCOUNTER — HOSPITAL ENCOUNTER (OUTPATIENT)
Facility: CLINIC | Age: 47
Discharge: HOME | End: 2017-03-30
Attending: INTERNAL MEDICINE | Admitting: INTERNAL MEDICINE
Payer: MEDICARE

## 2017-03-30 ENCOUNTER — ANESTHESIA (OUTPATIENT)
Dept: GASTROENTEROLOGY | Facility: CLINIC | Age: 47
End: 2017-03-30
Payer: MEDICARE

## 2017-03-30 ENCOUNTER — SURGERY (OUTPATIENT)
Age: 47
End: 2017-03-30

## 2017-03-30 ENCOUNTER — ANESTHESIA EVENT (OUTPATIENT)
Dept: GASTROENTEROLOGY | Facility: CLINIC | Age: 47
End: 2017-03-30
Payer: MEDICARE

## 2017-03-30 VITALS
WEIGHT: 202 LBS | SYSTOLIC BLOOD PRESSURE: 96 MMHG | DIASTOLIC BLOOD PRESSURE: 56 MMHG | TEMPERATURE: 97.6 F | HEIGHT: 70 IN | OXYGEN SATURATION: 100 % | RESPIRATION RATE: 24 BRPM | BODY MASS INDEX: 28.92 KG/M2

## 2017-03-30 LAB
GLUCOSE BLDC GLUCOMTR-MCNC: 173 MG/DL (ref 70–99)
UPPER GI ENDOSCOPY: NORMAL

## 2017-03-30 PROCEDURE — 25000128 H RX IP 250 OP 636: Performed by: NURSE ANESTHETIST, CERTIFIED REGISTERED

## 2017-03-30 PROCEDURE — 25000125 ZZHC RX 250: Performed by: NURSE ANESTHETIST, CERTIFIED REGISTERED

## 2017-03-30 PROCEDURE — 25000132 ZZH RX MED GY IP 250 OP 250 PS 637: Mod: GY | Performed by: NURSE ANESTHETIST, CERTIFIED REGISTERED

## 2017-03-30 PROCEDURE — 82962 GLUCOSE BLOOD TEST: CPT

## 2017-03-30 PROCEDURE — 37000008 ZZH ANESTHESIA TECHNICAL FEE, 1ST 30 MIN: Performed by: INTERNAL MEDICINE

## 2017-03-30 PROCEDURE — 25800025 ZZH RX 258: Performed by: NURSE ANESTHETIST, CERTIFIED REGISTERED

## 2017-03-30 PROCEDURE — 43235 EGD DIAGNOSTIC BRUSH WASH: CPT | Performed by: INTERNAL MEDICINE

## 2017-03-30 RX ORDER — ONDANSETRON 2 MG/ML
4 INJECTION INTRAMUSCULAR; INTRAVENOUS
Status: DISCONTINUED | OUTPATIENT
Start: 2017-03-30 | End: 2017-03-30 | Stop reason: HOSPADM

## 2017-03-30 RX ORDER — SODIUM CHLORIDE, SODIUM LACTATE, POTASSIUM CHLORIDE, CALCIUM CHLORIDE 600; 310; 30; 20 MG/100ML; MG/100ML; MG/100ML; MG/100ML
INJECTION, SOLUTION INTRAVENOUS CONTINUOUS
Status: CANCELLED | OUTPATIENT
Start: 2017-03-30

## 2017-03-30 RX ORDER — ONDANSETRON 2 MG/ML
4 INJECTION INTRAMUSCULAR; INTRAVENOUS EVERY 30 MIN PRN
Status: CANCELLED | OUTPATIENT
Start: 2017-03-30

## 2017-03-30 RX ORDER — NALOXONE HYDROCHLORIDE 0.4 MG/ML
.1-.4 INJECTION, SOLUTION INTRAMUSCULAR; INTRAVENOUS; SUBCUTANEOUS
Status: CANCELLED | OUTPATIENT
Start: 2017-03-30 | End: 2017-03-31

## 2017-03-30 RX ORDER — GLYCOPYRROLATE 0.2 MG/ML
INJECTION, SOLUTION INTRAMUSCULAR; INTRAVENOUS PRN
Status: DISCONTINUED | OUTPATIENT
Start: 2017-03-30 | End: 2017-03-30

## 2017-03-30 RX ORDER — PROPOFOL 10 MG/ML
INJECTION, EMULSION INTRAVENOUS PRN
Status: DISCONTINUED | OUTPATIENT
Start: 2017-03-30 | End: 2017-03-30

## 2017-03-30 RX ORDER — ONDANSETRON 2 MG/ML
INJECTION INTRAMUSCULAR; INTRAVENOUS PRN
Status: DISCONTINUED | OUTPATIENT
Start: 2017-03-30 | End: 2017-03-30

## 2017-03-30 RX ORDER — ONDANSETRON 4 MG/1
4 TABLET, ORALLY DISINTEGRATING ORAL EVERY 30 MIN PRN
Status: CANCELLED | OUTPATIENT
Start: 2017-03-30

## 2017-03-30 RX ORDER — SODIUM CHLORIDE, SODIUM LACTATE, POTASSIUM CHLORIDE, CALCIUM CHLORIDE 600; 310; 30; 20 MG/100ML; MG/100ML; MG/100ML; MG/100ML
INJECTION, SOLUTION INTRAVENOUS CONTINUOUS PRN
Status: DISCONTINUED | OUTPATIENT
Start: 2017-03-30 | End: 2017-03-30

## 2017-03-30 RX ORDER — MEPERIDINE HYDROCHLORIDE 25 MG/ML
12.5 INJECTION INTRAMUSCULAR; INTRAVENOUS; SUBCUTANEOUS
Status: CANCELLED | OUTPATIENT
Start: 2017-03-30

## 2017-03-30 RX ORDER — HEPARIN SODIUM (PORCINE) LOCK FLUSH IV SOLN 100 UNIT/ML 100 UNIT/ML
5 SOLUTION INTRAVENOUS
Status: DISCONTINUED | OUTPATIENT
Start: 2017-03-30 | End: 2017-03-30 | Stop reason: HOSPADM

## 2017-03-30 RX ADMIN — MIDAZOLAM HYDROCHLORIDE 2 MG: 1 INJECTION, SOLUTION INTRAMUSCULAR; INTRAVENOUS at 12:54

## 2017-03-30 RX ADMIN — BENZOCAINE 2 EACH: 220 SPRAY, METERED PERIODONTAL at 13:00

## 2017-03-30 RX ADMIN — PROPOFOL 60 MG: 10 INJECTION, EMULSION INTRAVENOUS at 13:01

## 2017-03-30 RX ADMIN — SODIUM CHLORIDE, POTASSIUM CHLORIDE, SODIUM LACTATE AND CALCIUM CHLORIDE: 600; 310; 30; 20 INJECTION, SOLUTION INTRAVENOUS at 12:54

## 2017-03-30 RX ADMIN — GLYCOPYRROLATE 0.2 MG: 0.2 INJECTION, SOLUTION INTRAMUSCULAR; INTRAVENOUS at 12:54

## 2017-03-30 RX ADMIN — ONDANSETRON 4 MG: 2 INJECTION INTRAMUSCULAR; INTRAVENOUS at 12:54

## 2017-03-30 NOTE — DISCHARGE INSTRUCTIONS
[facility] Upper Endoscopy (EGD) with Monitored Anesthesia Care  For 24 hours after your procedure  Sedation:  1. Get plenty of rest. A responsible adult must stay with you for at least 24 hours after you leave the hospital.   2. Do not drive or use heavy equipment. If you have weakness or tingling, don't drive or use heavy equipment until this feeling goes away.  3. Do not drink alcohol.  4. Avoid strenuous or risky activities. Ask for help when climbing stairs.   5. You may feel lightheaded. IF so, sit for a few minutes before standing. Have someone help you get up.   6. If you have nausea (feel sick to your stomach): Drink only clear liquids such as apple juice, ginger ale, broth or 7-Up. Rest may also help. Be sure to drink enough fluids. Move to a regular diet as you feel able.  7. You may have a slight fever. Call the doctor if your fever is over 100 F (37.7 C) (taken under the tongue) or lasts longer than 24 hours.  8. You may have a dry mouth, a sore throat, muscle aches or trouble sleeping. These should go away after 24 hours.  9. Do not make important or legal decisions.   Procedural:  1. Wait one hour before eating or drinking. Start with sips of water. When your gag reflex has returned, you may return to your normal diet, medicines, and light exercise.  2. Some bloating is normal. You may have large burps or pass air.  3. You may have a sore throat for 2 to 3 days. If so, it may help to:    Avoid hot liquids for 24 hours.    Use sore throat lozenges.    Gargle as need with salt water up to 4 times a day. Mix 1 cup of warm water with 1 teaspoon of salt. Do not swallow.  4. You may take Tylenol (acetaminophen) for pain unless your doctor has told you not to.   Call right away if you have:  1. Unusual pain in belly or chest pain not relieved with passing air.  2. Severe throat pain or trouble swallowing.  3. Black stools (tar-like looking bowel movement).  4. Headache for over 24 hours.  Follow-up:  If you have  severe pain, bleeding, vomit blood, or shortness of breath, go to an emergency room.  If you have questions, call:  Endoscopy: Monday to Friday, 7 a.m. to 4:30 p.m. [phone] (We may have to call you back)  After hours: Hospital  [phone] (Ask for the GI fellow on call)

## 2017-03-30 NOTE — ANESTHESIA POSTPROCEDURE EVALUATION
Patient: Mono Varghese    Procedure(s):  6 wk f/u, EGD W MAC/DX: surveillance,screening/knows prep & will have pre-op done prior to procedure - Wound Class: II-Clean Contaminated    Diagnosis:6 wk f/u, EGD W MAC/DX: Hepatic cirrhosis/knows prep & will have pre-op done prior to procedure  Diagnosis Additional Information: No value filed.    Anesthesia Type:  MAC    Note:  Anesthesia Post Evaluation    Patient location during evaluation: PACU  Patient participation: Able to fully participate in evaluation  Level of consciousness: awake  Pain management: adequate  Airway patency: patent  Cardiovascular status: acceptable  Respiratory status: acceptable  Hydration status: acceptable  PONV: none     Anesthetic complications: None          Last vitals:  Vitals:    03/30/17 1316 03/30/17 1317 03/30/17 1319   BP:      Resp: 16 13    Temp:   36.4  C (97.6  F)   SpO2: 94% 94%          Electronically Signed By: Steven Hinds MD  March 30, 2017  1:46 PM

## 2017-03-30 NOTE — ANESTHESIA CARE TRANSFER NOTE
Patient: Mono Varghese    Procedure(s):  6 wk f/u, EGD W MAC/DX: surveillance,screening/knows prep & will have pre-op done prior to procedure - Wound Class: II-Clean Contaminated    Diagnosis: 6 wk f/u, EGD W MAC/DX: Hepatic cirrhosis/knows prep & will have pre-op done prior to procedure  Diagnosis Additional Information: No value filed.    Anesthesia Type:   MAC     Note:  Airway :Room Air  Patient transferred to:Phase II  Comments: Transferred to: Phase II on RA    Patient vital signs: stable    Airway: none    Spontaneous breathing. Arouses to voice. Monitors applied.    Report to RN.        Vitals: (Last set prior to Anesthesia Care Transfer)    CRNA VITALS  3/30/2017 1239 - 3/30/2017 1314      3/30/2017             Pulse: 74    Ht Rate: 76    SpO2: 97 %    Resp Rate (set): 10                Electronically Signed By: TIESHA Conklin CRNA  March 30, 2017  1:14 PM

## 2017-03-30 NOTE — IP AVS SNAPSHOT
[facility], [place], Endoscopy    [address] [address]    Phone:  [phone]                                       After Visit Summary   3/30/2017    Mono Varghese    MRN: [id number]           After Visit Summary Signature Page     I have received my discharge instructions, and my questions have been answered. I have discussed any challenges I see with this plan with the nurse or doctor.    ..........................................................................................................................................  Patient/Patient Representative Signature      ..........................................................................................................................................  Patient Representative Print Name and Relationship to Patient    ..................................................               ................................................  Date                                            Time    ..........................................................................................................................................  Reviewed by Signature/Title    ...................................................              ..............................................  Date                                                            Time

## 2017-03-30 NOTE — IP AVS SNAPSHOT
MRN:[id number]                      After Visit Summary   3/30/2017    Mono Varghese    MRN: [id number]           Thank you!     Thank you for choosing [facility] for your care. Our goal is always to provide you with excellent care. Hearing back from our patients is one way we can continue to improve our services. Please take a few minutes to complete the written survey that you may receive in the mail after you visit with us. Thank you!        Patient Information     Date Of Birth          1970        About your hospital stay     You were admitted on:  March 30, 2017 You last received care in the:  [facility], Endoscopy    You were discharged on:  March 30, 2017       Who to Call     For medical emergencies, please call 911.  For non-urgent questions about your medical care, please call your primary care provider or clinic, [phone]  For questions related to your surgery, please call your surgery clinic        Attending Provider     Provider Specialty    Jordana Ramirez MD Internal Medicine       Primary Care Provider Office Phone # Fax #    [place] [phone]       PRIMARY CARE CENTER [address] [place] [address]        Your next 10 appointments already scheduled     Apr 04, 2017  9:00 AM CDT   Lab with  LAB   [facility] Lab ([facility] [place])    [address]  1st Floor  [facility] [id number] [phone]            Apr 04, 2017 10:00 AM CDT   (Arrive by 9:45 AM)   Return General Liver with Elizabet Darling MD   [facility] Hepatology ([facility] [place])    [address]  3rd Floor  [facility] [id number] [phone]            Apr 05, 2017 10:35 AM CDT   (Arrive by 10:20 AM)   Return Visit with [place] oLuis MD   [facility] Primary Care Clinic ([facility] [place])    [address]  4th Floor  [facility] [address] [phone]               Further instructions from your care team       [facility] Upper Endoscopy (EGD) with Monitored Anesthesia Care  For 24 hours after your procedure  Sedation:  1. Get plenty of rest. A responsible adult must stay with you for at least 24 hours after you leave the hospital.   2. Do not drive or use heavy equipment. If you have weakness or tingling, don't drive or use heavy equipment until this feeling goes away.  3. Do not drink alcohol.  4. Avoid strenuous or risky activities. Ask for help when climbing stairs.   5. You may feel lightheaded. IF so, sit for a few minutes before standing. Have someone help you get up.   6. If you have nausea (feel sick to your stomach): Drink only clear liquids such as apple juice, ginger ale, broth or 7-Up. Rest may also help. Be sure to drink enough fluids. Move to a regular diet as you feel able.  7. You may have a slight fever. Call the doctor if your fever is over 100 F (37.7 C) (taken under the tongue) or lasts longer than 24 hours.  8. You may have a dry mouth, a sore throat, muscle aches or trouble sleeping. These should go away after 24 hours.  9. Do not make important or legal decisions.   Procedural:  1. Wait one hour before eating or drinking. Start with sips of water. When your gag reflex has returned, you may return to your normal diet, medicines, and light exercise.  2. Some bloating is normal. You may have large burps or pass air.  3. You may have a sore throat for 2 to 3 days. If so, it may help to:    Avoid hot liquids for 24 hours.    Use sore throat lozenges.    Gargle as need with salt water up to 4 times a day. Mix 1 cup of warm water with 1 teaspoon of salt. Do not swallow.  4. You may take Tylenol (acetaminophen) for pain unless your doctor has told you not to.   Call right away if you have:  1. Unusual pain in belly or chest pain not relieved with passing air.  2. Severe throat pain or trouble swallowing.  3. Black stools (tar-like looking bowel  "movement).  4. Headache for over 24 hours.  Follow-up:  If you have severe pain, bleeding, vomit blood, or shortness of breath, go to an emergency room.  If you have questions, call:  Endoscopy: Monday to Friday, 7 a.m. to 4:30 p.m. [phone] (We may have to call you back)  After hours: Hospital  [phone] (Ask for the GI fellow on call)              Pending Results     No orders found from 3/28/2017 to 3/31/2017.            Admission Information     Date & Time Provider Department Dept. Phone    3/30/2017 Jordana Ramirez MD [facility], [place], Endoscopy [phone]      Your Vitals Were     Blood Pressure Respirations Height Weight Pulse Oximetry BMI (Body Mass Index)    126/85 17 1.778 m (5' 10\") 91.6 kg (202 lb) 94% 28.98 kg/m2      [employer] Information     [employer] gives you secure access to your electronic health record. If you see a primary care provider, you can also send messages to your care team and make appointments. If you have questions, please call your primary care clinic.  If you do not have a primary care provider, please call [phone] and they will assist you.        Care EveryWhere ID     This is your Care EveryWhere ID. This could be used by other organizations to access your [facility] medical records  [id number]           Review of your medicines      UNREVIEWED medicines. Ask your doctor about these medicines        Dose / Directions    acetaminophen 500 MG tablet   Commonly known as:  TYLENOL   Used for:  Tension headache        Dose:  500 mg   Take 1 tablet (500 mg) by mouth every 6 hours as needed for mild pain   Quantity:  100 tablet   Refills:  1       Calcium Carb-Cholecalciferol 500-400 MG-UNIT Tabs   Commonly known as:  calcium 500 +D   Used for:  Malnutrition (H)        Dose:  500 mg   Take 500 mg by mouth daily   Quantity:  90 tablet   Refills:  1       cholecalciferol 1000 UNITS capsule   Commonly known as:  vitamin  -D   Used for:  Malnutrition (H)        " Dose:  1 capsule   Take 1 capsule (1,000 Units) by mouth daily   Quantity:  60 capsule   Refills:  0       cyanocobalamin 1000 MCG Tabs   Used for:  Alcoholic cirrhosis of liver with ascites (H)        Dose:  1000 mcg   1,000 mcg by Per G Tube route daily   Quantity:  130 tablet   Refills:  1       DILANTIN PO        Dose:  30 mg   Take 30 mg by mouth every morning   Refills:  0       escitalopram 20 MG tablet   Commonly known as:  LEXAPRO   Used for:  Mild episode of recurrent major depressive disorder (H)        Dose:  20 mg   Take 1 tablet (20 mg) by mouth daily   Quantity:  90 tablet   Refills:  3       ferrous sulfate 325 (65 FE) MG tablet   Commonly known as:  IRON   Used for:  Other iron deficiency anemia        Dose:  1 tablet   Take 1 tablet (325 mg) by mouth 2 times daily   Quantity:  200 tablet   Refills:  3       gabapentin 100 MG capsule   Commonly known as:  NEURONTIN   Used for:  Mild episode of recurrent major depressive disorder (H)        Dose:  100 mg   Take 1 capsule (100 mg) by mouth At Bedtime   Quantity:  30 capsule   Refills:  3       glipiZIDE 10 MG 24 hr tablet   Commonly known as:  GLUCOTROL XL   Used for:  DM type 2, goal HbA1c 7%-8% (H)        Dose:  10 mg   Take 1 tablet (10 mg) by mouth daily   Quantity:  30 tablet   Refills:  3       glucose 40 % Gel gel        Dose:  30 g   Take 30 g by mouth every 15 minutes as needed for low blood sugar   Refills:  0       HYDROcodone-acetaminophen 5-325 MG per tablet   Commonly known as:  NORCO   Used for:  Brain tumor (H)        Dose:  2 tablet   Take 2 tablets by mouth every 6 hours as needed for moderate to severe pain   Quantity:  60 tablet   Refills:  0       hydrOXYzine 25 MG tablet   Commonly known as:  ATARAX   Used for:  Itching, Anxiety        Dose:  25 mg   Take 1 tablet (25 mg) by mouth 2 times daily   Quantity:  180 tablet   Refills:  1       hypromellose 0.4 % Soln ophthalmic solution   Commonly known as:  ARTIFICIAL TEARS   Used  for:  Presbyopia        Dose:  2 drop   Place 2 drops into both eyes every 8 hours   Quantity:  1 Bottle   Refills:  11       lactulose 20 GM/30ML Soln   Used for:  Hepatic encephalopathy (H)        Dose:  30 mL   Take 30 mLs by mouth every morning   Quantity:  946 mL   Refills:  11       levothyroxine 88 MCG tablet   Commonly known as:  SYNTHROID/LEVOTHROID   Used for:  Hypothyroidism        Dose:  88 mcg   Take 1 tablet (88 mcg) by mouth daily   Quantity:  90 tablet   Refills:  3       melatonin 5 MG tablet        Dose:  5 mg   Take 5 mg by mouth nightly as needed for sleep   Refills:  0       mirtazapine 15 MG tablet   Commonly known as:  REMERON   Used for:  Moderate episode of recurrent major depressive disorder (H)        Take 7.5 mg (1/2 tablet) at bedtime for 1 week, THEN increase to 15 mg (1 tablet) at bedtime.   Quantity:  30 tablet   Refills:  2       multivitamin, therapeutic with minerals Tabs tablet        Dose:  1 tablet   Take 1 tablet by mouth 2 times daily   Refills:  0       omeprazole 20 MG CR capsule   Commonly known as:  priLOSEC   Used for:  Gastroesophageal reflux disease without esophagitis        Dose:  40 mg   Take 2 capsules (40 mg) by mouth 2 times daily   Quantity:  120 capsule   Refills:  11       pravastatin 80 MG tablet   Commonly known as:  PRAVACHOL   Used for:  High cholesterol        Dose:  80 mg   Take 1 tablet (80 mg) by mouth daily   Quantity:  90 tablet   Refills:  1       sulfamethoxazole-trimethoprim 800-160 MG per tablet   Commonly known as:  BACTRIM DS/SEPTRA DS   Used for:  Spontaneous bacterial peritonitis (H)        Dose:  1 tablet   Take 1 tablet by mouth 2 times daily   Quantity:  60 tablet   Refills:  5       thiamine 100 MG tablet   Used for:  Alcoholic cirrhosis of liver with ascites (H)        Dose:  100 mg   Take 1 tablet (100 mg) by mouth daily   Quantity:  100 tablet   Refills:  1       traZODone 50 MG tablet   Commonly known as:  DESYREL   Used for:  Primary  insomnia        Dose:  75 mg   Take 1.5 tablets (75 mg) by mouth nightly as needed for sleep   Quantity:  135 tablet   Refills:  1         CONTINUE these medicines which have NOT CHANGED        Dose / Directions    blood glucose monitoring test strip   Commonly known as:  ONE TOUCH ULTRA   Used for:  Diabetes mellitus, type 2 (H)        Use to test blood sugars 4 times daily as needed or as directed.   Quantity:  300 each   Refills:  0       ONE TOUCH LANCETS Misc   Used for:  Type II or unspecified type diabetes mellitus without mention of complication, not stated as uncontrolled        by In Vitro route 4 times daily as needed   Quantity:  100 each   Refills:  prn                Protect others around you: Learn how to safely use, store and throw away your medicines at www.disposemymeds.org.             Medication List: This is a list of all your medications and when to take them. Check marks below indicate your daily home schedule. Keep this list as a reference.      Medications           Morning Afternoon Evening Bedtime As Needed    acetaminophen 500 MG tablet   Commonly known as:  TYLENOL   Take 1 tablet (500 mg) by mouth every 6 hours as needed for mild pain                                blood glucose monitoring test strip   Commonly known as:  ONE TOUCH ULTRA   Use to test blood sugars 4 times daily as needed or as directed.                                Calcium Carb-Cholecalciferol 500-400 MG-UNIT Tabs   Commonly known as:  calcium 500 +D   Take 500 mg by mouth daily                                cholecalciferol 1000 UNITS capsule   Commonly known as:  vitamin  -D   Take 1 capsule (1,000 Units) by mouth daily                                cyanocobalamin 1000 MCG Tabs   1,000 mcg by Per G Tube route daily                                DILANTIN PO   Take 30 mg by mouth every morning                                escitalopram 20 MG tablet   Commonly known as:  LEXAPRO   Take 1 tablet (20 mg) by mouth  daily                                ferrous sulfate 325 (65 FE) MG tablet   Commonly known as:  IRON   Take 1 tablet (325 mg) by mouth 2 times daily                                gabapentin 100 MG capsule   Commonly known as:  NEURONTIN   Take 1 capsule (100 mg) by mouth At Bedtime                                glipiZIDE 10 MG 24 hr tablet   Commonly known as:  GLUCOTROL XL   Take 1 tablet (10 mg) by mouth daily                                glucose 40 % Gel gel   Take 30 g by mouth every 15 minutes as needed for low blood sugar                                HYDROcodone-acetaminophen 5-325 MG per tablet   Commonly known as:  NORCO   Take 2 tablets by mouth every 6 hours as needed for moderate to severe pain                                hydrOXYzine 25 MG tablet   Commonly known as:  ATARAX   Take 1 tablet (25 mg) by mouth 2 times daily                                hypromellose 0.4 % Soln ophthalmic solution   Commonly known as:  ARTIFICIAL TEARS   Place 2 drops into both eyes every 8 hours                                lactulose 20 GM/30ML Soln   Take 30 mLs by mouth every morning                                levothyroxine 88 MCG tablet   Commonly known as:  SYNTHROID/LEVOTHROID   Take 1 tablet (88 mcg) by mouth daily                                melatonin 5 MG tablet   Take 5 mg by mouth nightly as needed for sleep                                mirtazapine 15 MG tablet   Commonly known as:  REMERON   Take 7.5 mg (1/2 tablet) at bedtime for 1 week, THEN increase to 15 mg (1 tablet) at bedtime.                                multivitamin, therapeutic with minerals Tabs tablet   Take 1 tablet by mouth 2 times daily                                omeprazole 20 MG CR capsule   Commonly known as:  priLOSEC   Take 2 capsules (40 mg) by mouth 2 times daily                                ONE TOUCH LANCETS Misc   by In Vitro route 4 times daily as needed                                pravastatin 80 MG tablet    Commonly known as:  PRAVACHOL   Take 1 tablet (80 mg) by mouth daily                                sulfamethoxazole-trimethoprim 800-160 MG per tablet   Commonly known as:  BACTRIM DS/SEPTRA DS   Take 1 tablet by mouth 2 times daily                                thiamine 100 MG tablet   Take 1 tablet (100 mg) by mouth daily                                traZODone 50 MG tablet   Commonly known as:  DESYREL   Take 1.5 tablets (75 mg) by mouth nightly as needed for sleep

## 2017-03-30 NOTE — ANESTHESIA PREPROCEDURE EVALUATION
Anesthesia Evaluation         Anesthesia Plan      History & Physical Review  History and physical reviewed and following examination; no interval change.    ASA Status:  3 .    NPO Status:  > 6 hours    Plan for MAC with Intravenous induction. Maintenance will be TIVA.  Reason for MAC:  Difficulty with conscious sedation (QS)         Postoperative Care      Consents          ANESTHESIA PREOP EVALUATION    NPO Status:  Yes, NPO    Procedure: Procedure(s):  6 wk f/u, EGD W MAC/DX: surveillance,screening/knows prep & will have pre-op done prior to procedure - Wound Class: II-Clean Contaminated    HPI: Presents for EGD for esophageal varices surveillance.      PMHx/PSHx/ROS:  PAST MEDICAL HISTORY:   Past Medical History:   Diagnosis Date     Brain tumor (H)      Liver failure (H)        PAST SURGICAL HISTORY:   Past Surgical History:   Procedure Laterality Date     COLONOSCOPY N/A 11/27/2015    Procedure: COMBINED COLONOSCOPY, SINGLE OR MULTIPLE BIOPSY/POLYPECTOMY BY BIOPSY;  Surgeon: Ran Thurston MD;  Location:  GI     ESOPHAGOSCOPY, GASTROSCOPY, DUODENOSCOPY (EGD), COMBINED N/A 11/23/2015    Procedure: COMBINED ESOPHAGOSCOPY, GASTROSCOPY, DUODENOSCOPY (EGD);  Surgeon: Rocael Rizvi MD;  Location:  OR     ESOPHAGOSCOPY, GASTROSCOPY, DUODENOSCOPY (EGD), COMBINED N/A 1/25/2017    Procedure: COMBINED ESOPHAGOSCOPY, GASTROSCOPY, DUODENOSCOPY (EGD), BIOPSY SINGLE OR MULTIPLE;  Surgeon: Rocael Tejada MD;  Location:  GI     OPTICAL TRACKING SYSTEM CRANIOTOMY, EXCISE TUMOR, COMBINED  6/6/2013    Procedure: COMBINED OPTICAL TRACKING SYSTEM CRANIOTOMY, EXCISE TUMOR;  Left Stealth Guided Craniotomy , Tumor Resection ;  Surgeon: J Carlos Aranda MD;  Location:  OR     ORTHOPEDIC SURGERY      hand surgery- right     SIGMOIDOSCOPY FLEXIBLE N/A 11/24/2015    Procedure: SIGMOIDOSCOPY FLEXIBLE;  Surgeon: Rocael Rizvi MD;  Location:  GI       FAMILY HISTORY:   Family History   Problem Relation Age  of Onset     Unknown/Adopted Mother          Past Anes Hx: No personal or family h/o anesthesia problems    Soc Hx:   Tobacco:   EtOH:     Allergies:   Allergies   Allergen Reactions     No Clinical Screening - See Comments      Coban and Surgilast cause itching     Tegaderm Transparent Dressing (Informational Only)      Latex Itching and Rash       Meds:   Prescriptions Prior to Admission   Medication Sig Dispense Refill Last Dose     HYDROcodone-acetaminophen (NORCO) 5-325 MG per tablet Take 2 tablets by mouth every 6 hours as needed for moderate to severe pain 60 tablet 0      traZODone (DESYREL) 50 MG tablet Take 1.5 tablets (75 mg) by mouth nightly as needed for sleep 135 tablet 1      levothyroxine (SYNTHROID/LEVOTHROID) 88 MCG tablet Take 1 tablet (88 mcg) by mouth daily 90 tablet 3      mirtazapine (REMERON) 15 MG tablet Take 7.5 mg (1/2 tablet) at bedtime for 1 week, THEN increase to 15 mg (1 tablet) at bedtime. 30 tablet 2      gabapentin (NEURONTIN) 100 MG capsule Take 1 capsule (100 mg) by mouth At Bedtime 30 capsule 3 Taking     cholecalciferol (VITAMIN  -D) 1000 UNITS capsule Take 1 capsule (1,000 Units) by mouth daily (Patient taking differently: Take 1 capsule by mouth every morning ) 60 capsule 0 Taking     omeprazole (PRILOSEC) 20 MG CR capsule Take 2 capsules (40 mg) by mouth 2 times daily 120 capsule 11 Taking     hydrOXYzine (ATARAX) 25 MG tablet Take 1 tablet (25 mg) by mouth 2 times daily 180 tablet 1 Taking     glipiZIDE (GLUCOTROL XL) 10 MG 24 hr tablet Take 1 tablet (10 mg) by mouth daily (Patient taking differently: Take 10 mg by mouth every morning ) 30 tablet 3 Taking     thiamine 100 MG tablet Take 1 tablet (100 mg) by mouth daily (Patient taking differently: Take 100 mg by mouth every morning ) 100 tablet 1 Taking     cyanocobalamin 1000 MCG TABS 1,000 mcg by Per G Tube route daily (Patient taking differently: Take 1,000 mcg by mouth daily ) 130 tablet 1 Taking     ferrous sulfate  (IRON) 325 (65 FE) MG tablet Take 1 tablet (325 mg) by mouth 2 times daily 200 tablet 3 Taking     pravastatin (PRAVACHOL) 80 MG tablet Take 1 tablet (80 mg) by mouth daily (Patient taking differently: Take 80 mg by mouth every morning ) 90 tablet 1 Taking     lactulose 20 GM/30ML SOLN Take 30 mLs by mouth every morning (Patient taking differently: Take 30 mLs by mouth every other day ) 946 mL 11 Taking     sulfamethoxazole-trimethoprim (BACTRIM DS,SEPTRA DS) 800-160 MG per tablet Take 1 tablet by mouth 2 times daily (Patient taking differently: Take 1 tablet by mouth every morning ) 60 tablet 5 Taking     escitalopram (LEXAPRO) 20 MG tablet Take 1 tablet (20 mg) by mouth daily (Patient taking differently: Take 20 mg by mouth every morning ) 90 tablet 3 Taking     Phenytoin (DILANTIN PO) Take 30 mg by mouth every morning    Taking     acetaminophen (TYLENOL) 500 MG tablet Take 1 tablet (500 mg) by mouth every 6 hours as needed for mild pain 100 tablet 1 Taking     Calcium Carb-Cholecalciferol (CALCIUM 500 +D) 500-400 MG-UNIT TABS Take 500 mg by mouth daily (Patient taking differently: Take 500 mg by mouth every morning ) 90 tablet 1 Taking     blood glucose monitoring (ONE TOUCH ULTRA) test strip Use to test blood sugars 4 times daily as needed or as directed. 300 each 0 Taking     hypromellose (ARTIFICIAL TEARS) 0.4 % SOLN Place 2 drops into both eyes every 8 hours 1 Bottle 11 Taking     multivitamin, therapeutic with minerals (THERA-VIT-M) TABS Take 1 tablet by mouth 2 times daily   Taking     glucose 40 % GEL Take 30 g by mouth every 15 minutes as needed for low blood sugar   Taking     melatonin 5 MG tablet Take 5 mg by mouth nightly as needed for sleep   Taking     ONE TOUCH LANCETS MISC by In Vitro route 4 times daily as needed 100 each prn Taking       No current outpatient prescriptions on file.       Physical Exam:  VS: T Data Unavailable, P Data Unavailable, BP Data Unavailable, R Data Unavailable, SpO2    Weight   Wt Readings from Last 2 Encounters:   03/03/17 92 kg (202 lb 14.4 oz)   02/02/17 90.8 kg (200 lb 3.2 oz)         Airway: MP 2, TM>3FB, Neck full ROM  Dentition: no loose teeth  Heart: RRR  Lungs: CTAB      BMP:  Lab Results   Component Value Date     03/01/2017      Lab Results   Component Value Date    POTASSIUM 4.2 03/01/2017     Lab Results   Component Value Date    CHLORIDE 107 03/01/2017     Lab Results   Component Value Date    UCHE 8.6 03/01/2017     Lab Results   Component Value Date    CO2 25 03/01/2017     Lab Results   Component Value Date    BUN 11 03/01/2017     Lab Results   Component Value Date    CR 0.85 03/01/2017     Lab Results   Component Value Date     03/01/2017        CBC:  Lab Results   Component Value Date    WBC 1.5 03/01/2017     Lab Results   Component Value Date    HGB 12.3 03/01/2017     Lab Results   Component Value Date    HCT 36.4 03/01/2017     Lab Results   Component Value Date    PLT 39 03/01/2017        Coags/Type and Screen  Lab Results   Component Value Date    INR 1.26 03/01/2017     No results found for: PT  Type and Screen:      Assessment/Plan:  - ASA 3  - MAC with standard ASA monitors, IV induction, balanced anesthetic  - PIV  - Antibiotics per surgery  - PONV prophylaxis  - Blood products available, possible administration discussed with patient  Steven Hinds MD    3/30/2017  11:26 AM

## 2017-03-30 NOTE — TELEPHONE ENCOUNTER
Called back with verbal auth.  --------------------------------------------      ----- Message from Danielito Carias sent at 3/29/2017  3:11 PM CDT -----  Regarding: Orders  Contact: [phone]  Lisa from [facility]    Requesting verbal order for skill nursing visit 1 time a week for 9 weeks and 3 as needed visit.

## 2017-04-01 ENCOUNTER — MEDICAL CORRESPONDENCE (OUTPATIENT)
Dept: HEALTH INFORMATION MANAGEMENT | Facility: CLINIC | Age: 47
End: 2017-04-01

## 2017-04-05 ENCOUNTER — OFFICE VISIT (OUTPATIENT)
Dept: FAMILY MEDICINE | Facility: CLINIC | Age: 47
End: 2017-04-05

## 2017-04-05 VITALS
DIASTOLIC BLOOD PRESSURE: 86 MMHG | BODY MASS INDEX: 29.7 KG/M2 | SYSTOLIC BLOOD PRESSURE: 132 MMHG | HEART RATE: 75 BPM | WEIGHT: 207 LBS | OXYGEN SATURATION: 95 % | RESPIRATION RATE: 16 BRPM

## 2017-04-05 DIAGNOSIS — R41.0 CONFUSION: ICD-10-CM

## 2017-04-05 DIAGNOSIS — R41.0 CONFUSION: Primary | ICD-10-CM

## 2017-04-05 LAB
ALBUMIN SERPL-MCNC: 3.5 G/DL (ref 3.4–5)
ALBUMIN UR-MCNC: NEGATIVE MG/DL
ALP SERPL-CCNC: 167 U/L (ref 40–150)
ALT SERPL W P-5'-P-CCNC: 63 U/L (ref 0–70)
AMMONIA PLAS-SCNC: 29 UMOL/L (ref 10–50)
ANION GAP SERPL CALCULATED.3IONS-SCNC: 9 MMOL/L (ref 3–14)
APPEARANCE UR: CLEAR
AST SERPL W P-5'-P-CCNC: 102 U/L (ref 0–45)
BASOPHILS # BLD AUTO: 0 10E9/L (ref 0–0.2)
BASOPHILS NFR BLD AUTO: 0.9 %
BILIRUB SERPL-MCNC: 1.4 MG/DL (ref 0.2–1.3)
BILIRUB UR QL STRIP: NEGATIVE
BUN SERPL-MCNC: 11 MG/DL (ref 7–30)
CALCIUM SERPL-MCNC: 8.6 MG/DL (ref 8.5–10.1)
CHLORIDE SERPL-SCNC: 104 MMOL/L (ref 94–109)
CO2 SERPL-SCNC: 25 MMOL/L (ref 20–32)
COLOR UR AUTO: YELLOW
CREAT SERPL-MCNC: 1.09 MG/DL (ref 0.66–1.25)
DIFFERENTIAL METHOD BLD: ABNORMAL
EOSINOPHIL # BLD AUTO: 0.1 10E9/L (ref 0–0.7)
EOSINOPHIL NFR BLD AUTO: 7.7 %
ERYTHROCYTE [DISTWIDTH] IN BLOOD BY AUTOMATED COUNT: 14.4 % (ref 10–15)
GFR SERPL CREATININE-BSD FRML MDRD: 72 ML/MIN/1.7M2
GLUCOSE SERPL-MCNC: 279 MG/DL (ref 70–99)
GLUCOSE UR STRIP-MCNC: >499 MG/DL
HCT VFR BLD AUTO: 36.5 % (ref 40–53)
HGB BLD-MCNC: 12.3 G/DL (ref 13.3–17.7)
HGB UR QL STRIP: NEGATIVE
IMM GRANULOCYTES # BLD: 0 10E9/L (ref 0–0.4)
IMM GRANULOCYTES NFR BLD: 0.9 %
KETONES UR STRIP-MCNC: NEGATIVE MG/DL
LEUKOCYTE ESTERASE UR QL STRIP: NEGATIVE
LYMPHOCYTES # BLD AUTO: 0.2 10E9/L (ref 0.8–5.3)
LYMPHOCYTES NFR BLD AUTO: 20.5 %
MCH RBC QN AUTO: 32.6 PG (ref 26.5–33)
MCHC RBC AUTO-ENTMCNC: 33.7 G/DL (ref 31.5–36.5)
MCV RBC AUTO: 97 FL (ref 78–100)
MONOCYTES # BLD AUTO: 0.2 10E9/L (ref 0–1.3)
MONOCYTES NFR BLD AUTO: 18.8 %
NEUTROPHILS # BLD AUTO: 0.6 10E9/L (ref 1.6–8.3)
NEUTROPHILS NFR BLD AUTO: 51.2 %
NITRATE UR QL: NEGATIVE
NRBC # BLD AUTO: 0 10*3/UL
NRBC BLD AUTO-RTO: 0 /100
PH UR STRIP: 6 PH (ref 5–7)
PLATELET # BLD AUTO: 31 10E9/L (ref 150–450)
POTASSIUM SERPL-SCNC: 4.1 MMOL/L (ref 3.4–5.3)
PROT SERPL-MCNC: 7 G/DL (ref 6.8–8.8)
RBC # BLD AUTO: 3.77 10E12/L (ref 4.4–5.9)
RBC #/AREA URNS AUTO: 0 /HPF (ref 0–2)
SODIUM SERPL-SCNC: 138 MMOL/L (ref 133–144)
SP GR UR STRIP: 1.01 (ref 1–1.03)
T4 FREE SERPL-MCNC: 1.08 NG/DL (ref 0.76–1.46)
TSH SERPL DL<=0.005 MIU/L-ACNC: 7.05 MU/L (ref 0.4–4)
URN SPEC COLLECT METH UR: ABNORMAL
UROBILINOGEN UR STRIP-MCNC: 0 MG/DL (ref 0–2)
WBC # BLD AUTO: 1.2 10E9/L (ref 4–11)
WBC #/AREA URNS AUTO: <1 /HPF (ref 0–2)

## 2017-04-05 ASSESSMENT — PAIN SCALES - GENERAL: PAINLEVEL: NO PAIN (0)

## 2017-04-05 NOTE — MR AVS SNAPSHOT
After Visit Summary   4/5/2017    Mono Varghese    MRN: [id number]           Patient Information     Date Of Birth          1970        Visit Information        Provider Department      4/5/2017 10:35 AM King Louis MD [facility]        Today's Diagnoses     Confusion    -  1      Care Instructions    Primary Care Center Medication Refill Request Information:  * Please contact your pharmacy regarding ANY request for medication refills.  ** [facility] Prescription Fax = [phone]  * Please allow 3 business days for routine medication refills.  * Please allow 5 business days for controlled substance medication refills.     Primary Care Center Test Result notification information:  *You will be notified within 7-10 days of your appointment day regarding the results of your test.  If you are on MyChart you will be notified as soon as the provider has reviewed the results and signed off on them.          Follow-ups after your visit        Who to contact     Please call your clinic at [phone] to:    Ask questions about your health    Make or cancel appointments    Discuss your medicines    Learn about your test results    Speak to your doctor   If you have compliments or concerns about an experience at your clinic, or if you wish to file a complaint, please contact [facility] Patient Relations at [phone] or email us at [email or web address]         Additional Information About Your Visit        [employer] Information     [employer] gives you secure access to your electronic health record. If you see a primary care provider, you can also send messages to your care team and make appointments. If you have questions, please call your primary care clinic.  If you do not have a primary care provider, please call [phone] and they will assist you.      [employer] is an electronic gateway that provides easy, online access to your medical  records. With [employer], you can request a clinic appointment, read your test results, renew a prescription or communicate with your care team.     To access your existing account, please contact your [facility] or call [phone] for assistance.        Care EveryWhere ID     This is your Care EveryWhere ID. This could be used by other organizations to access your [place] medical records  [id number]        Your Vitals Were     Pulse Respirations Pulse Oximetry BMI (Body Mass Index)          75 16 95% 29.7 kg/m2         Blood Pressure from Last 3 Encounters:   04/05/17 132/86   03/30/17 96/56   03/03/17 118/76    Weight from Last 3 Encounters:   04/05/17 93.9 kg (207 lb)   03/30/17 91.6 kg (202 lb)   03/03/17 92 kg (202 lb 14.4 oz)                 Today's Medication Changes          These changes are accurate as of: 4/5/17  2:29 PM.  If you have any questions, ask your nurse or doctor.               These medicines have changed or have updated prescriptions.        Dose/Directions    Calcium Carb-Cholecalciferol 500-400 MG-UNIT Tabs   Commonly known as:  calcium 500 +D   This may have changed:  when to take this   Used for:  Malnutrition (H)        Dose:  500 mg   Take 500 mg by mouth daily   Quantity:  90 tablet   Refills:  1       cholecalciferol 1000 UNITS capsule   Commonly known as:  vitamin  -D   This may have changed:  when to take this   Used for:  Malnutrition (H)        Dose:  1 capsule   Take 1 capsule (1,000 Units) by mouth daily   Quantity:  60 capsule   Refills:  0       cyanocobalamin 1000 MCG Tabs   This may have changed:  how to take this   Used for:  Alcoholic cirrhosis of liver with ascites (H)        Dose:  1000 mcg   1,000 mcg by Per G Tube route daily   Quantity:  130 tablet   Refills:  1       escitalopram 20 MG tablet   Commonly known as:  LEXAPRO   This may have changed:  when to take this   Used for:  Mild episode of recurrent major depressive disorder (H)         Dose:  20 mg   Take 1 tablet (20 mg) by mouth daily   Quantity:  90 tablet   Refills:  3       glipiZIDE 10 MG 24 hr tablet   Commonly known as:  GLUCOTROL XL   This may have changed:  when to take this   Used for:  DM type 2, goal HbA1c 7%-8% (H)        Dose:  10 mg   Take 1 tablet (10 mg) by mouth daily   Quantity:  30 tablet   Refills:  3       lactulose 20 GM/30ML Soln   This may have changed:  when to take this   Used for:  Hepatic encephalopathy (H)        Dose:  30 mL   Take 30 mLs by mouth every morning   Quantity:  946 mL   Refills:  11       pravastatin 80 MG tablet   Commonly known as:  PRAVACHOL   This may have changed:  when to take this   Used for:  High cholesterol        Dose:  80 mg   Take 1 tablet (80 mg) by mouth daily   Quantity:  90 tablet   Refills:  1       sulfamethoxazole-trimethoprim 800-160 MG per tablet   Commonly known as:  BACTRIM DS/SEPTRA DS   This may have changed:  when to take this   Used for:  Spontaneous bacterial peritonitis (H)        Dose:  1 tablet   Take 1 tablet by mouth 2 times daily   Quantity:  60 tablet   Refills:  5       thiamine 100 MG tablet   This may have changed:  when to take this   Used for:  Alcoholic cirrhosis of liver with ascites (H)        Dose:  100 mg   Take 1 tablet (100 mg) by mouth daily   Quantity:  100 tablet   Refills:  1                Primary Care Provider Office Phone # Fax #    King Louis [phone]       [facility] [address] [place] [address]        Thank you!     Thank you for choosing [facility]  for your care. Our goal is always to provide you with excellent care. Hearing back from our patients is one way we can continue to improve our services. Please take a few minutes to complete the written survey that you may receive in the mail after your visit with us. Thank you!             Your Updated Medication List - Protect others around you: Learn how to safely use,  store and throw away your medicines at www.disposemymeds.org.          This list is accurate as of: 4/5/17  2:29 PM.  Always use your most recent med list.                   Brand Name Dispense Instructions for use    blood glucose monitoring test strip    ONE TOUCH ULTRA    300 each    Use to test blood sugars 4 times daily as needed or as directed.       Calcium Carb-Cholecalciferol 500-400 MG-UNIT Tabs    calcium 500 +D    90 tablet    Take 500 mg by mouth daily       cholecalciferol 1000 UNITS capsule    vitamin  -D    60 capsule    Take 1 capsule (1,000 Units) by mouth daily       cyanocobalamin 1000 MCG Tabs     130 tablet    1,000 mcg by Per G Tube route daily       DILANTIN PO      Take 30 mg by mouth every morning       escitalopram 20 MG tablet    LEXAPRO    90 tablet    Take 1 tablet (20 mg) by mouth daily       ferrous sulfate 325 (65 FE) MG tablet    IRON    200 tablet    Take 1 tablet (325 mg) by mouth 2 times daily       gabapentin 100 MG capsule    NEURONTIN    30 capsule    Take 1 capsule (100 mg) by mouth At Bedtime       glipiZIDE 10 MG 24 hr tablet    GLUCOTROL XL    30 tablet    Take 1 tablet (10 mg) by mouth daily       HYDROcodone-acetaminophen 5-325 MG per tablet    NORCO    60 tablet    Take 2 tablets by mouth every 6 hours as needed for moderate to severe pain       hydrOXYzine 25 MG tablet    ATARAX    180 tablet    Take 1 tablet (25 mg) by mouth 2 times daily       hypromellose 0.4 % Soln ophthalmic solution    ARTIFICIAL TEARS    1 Bottle    Place 2 drops into both eyes every 8 hours       lactulose 20 GM/30ML Soln     946 mL    Take 30 mLs by mouth every morning       levothyroxine 88 MCG tablet    SYNTHROID/LEVOTHROID    90 tablet    Take 1 tablet (88 mcg) by mouth daily       melatonin 5 MG tablet      Take 5 mg by mouth nightly as needed for sleep       mirtazapine 15 MG tablet    REMERON    30 tablet    Take 7.5 mg (1/2 tablet) at bedtime for 1 week, THEN increase to 15 mg (1  tablet) at bedtime.       multivitamin, therapeutic with minerals Tabs tablet      Take 1 tablet by mouth 2 times daily       omeprazole 20 MG CR capsule    priLOSEC    120 capsule    Take 2 capsules (40 mg) by mouth 2 times daily       ONE TOUCH LANCETS Misc     100 each    by In Vitro route 4 times daily as needed       pravastatin 80 MG tablet    PRAVACHOL    90 tablet    Take 1 tablet (80 mg) by mouth daily       sulfamethoxazole-trimethoprim 800-160 MG per tablet    BACTRIM DS/SEPTRA DS    60 tablet    Take 1 tablet by mouth 2 times daily       thiamine 100 MG tablet     100 tablet    Take 1 tablet (100 mg) by mouth daily       traZODone 50 MG tablet    DESYREL    135 tablet    Take 1.5 tablets (75 mg) by mouth nightly as needed for sleep

## 2017-04-05 NOTE — NURSING NOTE
Chief Complaint   Patient presents with     Recheck Medication     Pt is here to follow up on his medications.      Jodi Mancilla LPN April 5, 2017 9:50 AM

## 2017-04-05 NOTE — PROGRESS NOTES
SUBJECTIVE:    Pt is a 46 year old male with pmh of     Patient Active Problem List   Diagnosis     Type 2 diabetes mellitus (H)     Moderate major depression (H)     LENA (obstructive sleep apnea)-moderate (AHI 16)     Oligoastrocytoma of parietal lobe (H)     ADHD (attention deficit hyperactivity disorder)     Financial difficulties     Thrombocytopenia (H)     Partial epilepsy with impairment of consciousness (H)     Cirrhosis of liver (H)     Pulmonary nodules     Myopic astigmatism of both eyes     Presbyopia     Ringing in ears, unspecified laterality     GIB (gastrointestinal bleeding)     Portal vein thrombosis     Advance care planning     Chronic pain     Hyperlipidemia LDL goal <100     Pancytopenia (H)     Hypothyroidism     Malnutrition (H)     Hx of psychiatric care       who is here for evaluation of had concerns including Recheck Medication.    Here for a concern of confusion, for a few days. Wife here, she states more forgetful of late. Not always taking his lactulose, he dislikes the loose stool it brings on. No recent med changes from MDs they can recall, no ID c/o on ROS. Does have recent brain MR for cancer f/u, rvwd, no significant change. No seizure noted by wife.  No falls or head trauma known. No change motor skills or activity level.    Allergies   Allergen Reactions     No Clinical Screening - See Comments      Coban and Surgilast cause itching     Tegaderm Transparent Dressing (Informational Only)      Latex Itching and Rash           Current Outpatient Prescriptions   Medication Sig Dispense Refill     HYDROcodone-acetaminophen (NORCO) 5-325 MG per tablet Take 2 tablets by mouth every 6 hours as needed for moderate to severe pain 60 tablet 0     traZODone (DESYREL) 50 MG tablet Take 1.5 tablets (75 mg) by mouth nightly as needed for sleep 135 tablet 1     levothyroxine (SYNTHROID/LEVOTHROID) 88 MCG tablet Take 1 tablet (88 mcg) by mouth daily 90 tablet 3     mirtazapine (REMERON) 15 MG  tablet Take 7.5 mg (1/2 tablet) at bedtime for 1 week, THEN increase to 15 mg (1 tablet) at bedtime. 30 tablet 2     gabapentin (NEURONTIN) 100 MG capsule Take 1 capsule (100 mg) by mouth At Bedtime 30 capsule 3     cholecalciferol (VITAMIN  -D) 1000 UNITS capsule Take 1 capsule (1,000 Units) by mouth daily (Patient taking differently: Take 1 capsule by mouth every morning ) 60 capsule 0     omeprazole (PRILOSEC) 20 MG CR capsule Take 2 capsules (40 mg) by mouth 2 times daily 120 capsule 11     hydrOXYzine (ATARAX) 25 MG tablet Take 1 tablet (25 mg) by mouth 2 times daily 180 tablet 1     glipiZIDE (GLUCOTROL XL) 10 MG 24 hr tablet Take 1 tablet (10 mg) by mouth daily (Patient taking differently: Take 10 mg by mouth every morning ) 30 tablet 3     thiamine 100 MG tablet Take 1 tablet (100 mg) by mouth daily (Patient taking differently: Take 100 mg by mouth every morning ) 100 tablet 1     cyanocobalamin 1000 MCG TABS 1,000 mcg by Per G Tube route daily (Patient taking differently: Take 1,000 mcg by mouth daily ) 130 tablet 1     ferrous sulfate (IRON) 325 (65 FE) MG tablet Take 1 tablet (325 mg) by mouth 2 times daily 200 tablet 3     pravastatin (PRAVACHOL) 80 MG tablet Take 1 tablet (80 mg) by mouth daily (Patient taking differently: Take 80 mg by mouth every morning ) 90 tablet 1     lactulose 20 GM/30ML SOLN Take 30 mLs by mouth every morning (Patient taking differently: Take 30 mLs by mouth every other day ) 946 mL 11     sulfamethoxazole-trimethoprim (BACTRIM DS,SEPTRA DS) 800-160 MG per tablet Take 1 tablet by mouth 2 times daily (Patient taking differently: Take 1 tablet by mouth every morning ) 60 tablet 5     escitalopram (LEXAPRO) 20 MG tablet Take 1 tablet (20 mg) by mouth daily (Patient taking differently: Take 20 mg by mouth every morning ) 90 tablet 3     Phenytoin (DILANTIN PO) Take 30 mg by mouth every morning        Calcium Carb-Cholecalciferol (CALCIUM 500 +D) 500-400 MG-UNIT TABS Take 500 mg by  mouth daily (Patient taking differently: Take 500 mg by mouth every morning ) 90 tablet 1     blood glucose monitoring (ONE TOUCH ULTRA) test strip Use to test blood sugars 4 times daily as needed or as directed. 300 each 0     hypromellose (ARTIFICIAL TEARS) 0.4 % SOLN Place 2 drops into both eyes every 8 hours 1 Bottle 11     multivitamin, therapeutic with minerals (THERA-VIT-M) TABS Take 1 tablet by mouth 2 times daily       melatonin 5 MG tablet Take 5 mg by mouth nightly as needed for sleep       ONE TOUCH LANCETS MISC by In Vitro route 4 times daily as needed 100 each prn       Social History   Substance Use Topics     Smoking status: Never Smoker     Smokeless tobacco: Never Used     Alcohol use No      Comment: Quit 01/2014           OBJECTIVE:  /86  Pulse 75  Resp 16  Wt 93.9 kg (207 lb)  SpO2 95%  BMI 29.7 kg/m2  GENERAL APPEARANCE: Alert, no acute distress  RESP: lungs clear to auscultation   CV: normal rate, regular rhythm, no murmur or gallop  ABDOMEN: soft, no organomegaly, masses or tenderness  Awake, alert, NAD, he is having impaired cognition since brain surgery, not noticeably worse in the room but per wife he's more forgetful in the last week or so    ASSESSMENT/PLAN:    Confusion: labs, if negative I've asked his wife to call me next week if persists at worse level    KAYODE LANGSTON MD

## 2017-04-05 NOTE — PATIENT INSTRUCTIONS
Primary Care Center Medication Refill Request Information:  * Please contact your pharmacy regarding ANY request for medication refills.  ** [facility] Prescription Fax = [phone]  * Please allow 3 business days for routine medication refills.  * Please allow 5 business days for controlled substance medication refills.     Primary Care Center Test Result notification information:  *You will be notified within 7-10 days of your appointment day regarding the results of your test.  If you are on MyChart you will be notified as soon as the provider has reviewed the results and signed off on them.

## 2017-04-12 DIAGNOSIS — K74.60 HEPATIC CIRRHOSIS, UNSPECIFIED HEPATIC CIRRHOSIS TYPE (H): Primary | Chronic | ICD-10-CM

## 2017-04-12 DIAGNOSIS — I85.00 ESOPHAGEAL VARICES WITHOUT BLEEDING, UNSPECIFIED ESOPHAGEAL VARICES TYPE (H): ICD-10-CM

## 2017-04-13 ENCOUNTER — TELEPHONE (OUTPATIENT)
Dept: GASTROENTEROLOGY | Facility: CLINIC | Age: 47
End: 2017-04-13

## 2017-04-15 ENCOUNTER — MYC REFILL (OUTPATIENT)
Dept: FAMILY MEDICINE | Facility: CLINIC | Age: 47
End: 2017-04-15

## 2017-04-15 DIAGNOSIS — E11.9 DIABETES MELLITUS, TYPE 2 (H): ICD-10-CM

## 2017-04-15 DIAGNOSIS — D49.6 BRAIN TUMOR (H): ICD-10-CM

## 2017-04-18 ENCOUNTER — OFFICE VISIT (OUTPATIENT)
Dept: GASTROENTEROLOGY | Facility: CLINIC | Age: 47
End: 2017-04-18
Attending: INTERNAL MEDICINE
Payer: MEDICARE

## 2017-04-18 VITALS
HEART RATE: 65 BPM | WEIGHT: 204 LBS | HEIGHT: 70 IN | OXYGEN SATURATION: 96 % | TEMPERATURE: 97.9 F | SYSTOLIC BLOOD PRESSURE: 133 MMHG | BODY MASS INDEX: 29.2 KG/M2 | DIASTOLIC BLOOD PRESSURE: 82 MMHG

## 2017-04-18 DIAGNOSIS — K74.60 HEPATIC CIRRHOSIS, UNSPECIFIED HEPATIC CIRRHOSIS TYPE (H): Chronic | ICD-10-CM

## 2017-04-18 DIAGNOSIS — K70.30 ALCOHOLIC CIRRHOSIS OF LIVER WITHOUT ASCITES (H): ICD-10-CM

## 2017-04-18 DIAGNOSIS — I85.00 ESOPHAGEAL VARICES WITHOUT BLEEDING, UNSPECIFIED ESOPHAGEAL VARICES TYPE (H): ICD-10-CM

## 2017-04-18 DIAGNOSIS — K76.82 HEPATIC ENCEPHALOPATHY (H): Primary | ICD-10-CM

## 2017-04-18 LAB
ALBUMIN SERPL-MCNC: 3.3 G/DL (ref 3.4–5)
ALP SERPL-CCNC: 255 U/L (ref 40–150)
ALT SERPL W P-5'-P-CCNC: 66 U/L (ref 0–70)
ANION GAP SERPL CALCULATED.3IONS-SCNC: 7 MMOL/L (ref 3–14)
AST SERPL W P-5'-P-CCNC: 59 U/L (ref 0–45)
BILIRUB DIRECT SERPL-MCNC: 0.4 MG/DL (ref 0–0.2)
BILIRUB SERPL-MCNC: 0.9 MG/DL (ref 0.2–1.3)
BUN SERPL-MCNC: 11 MG/DL (ref 7–30)
CALCIUM SERPL-MCNC: 8.7 MG/DL (ref 8.5–10.1)
CHLORIDE SERPL-SCNC: 103 MMOL/L (ref 94–109)
CO2 SERPL-SCNC: 26 MMOL/L (ref 20–32)
CREAT SERPL-MCNC: 1.01 MG/DL (ref 0.66–1.25)
ERYTHROCYTE [DISTWIDTH] IN BLOOD BY AUTOMATED COUNT: 14 % (ref 10–15)
GFR SERPL CREATININE-BSD FRML MDRD: 79 ML/MIN/1.7M2
GLUCOSE SERPL-MCNC: 455 MG/DL (ref 70–99)
HCT VFR BLD AUTO: 40.3 % (ref 40–53)
HGB BLD-MCNC: 13.3 G/DL (ref 13.3–17.7)
INR PPP: 1.33 (ref 0.86–1.14)
MCH RBC QN AUTO: 31.9 PG (ref 26.5–33)
MCHC RBC AUTO-ENTMCNC: 33 G/DL (ref 31.5–36.5)
MCV RBC AUTO: 97 FL (ref 78–100)
PLATELET # BLD AUTO: 36 10E9/L (ref 150–450)
POTASSIUM SERPL-SCNC: 4.5 MMOL/L (ref 3.4–5.3)
PROT SERPL-MCNC: 7.1 G/DL (ref 6.8–8.8)
RBC # BLD AUTO: 4.17 10E12/L (ref 4.4–5.9)
SODIUM SERPL-SCNC: 136 MMOL/L (ref 133–144)
WBC # BLD AUTO: 1.6 10E9/L (ref 4–11)

## 2017-04-18 PROCEDURE — 80076 HEPATIC FUNCTION PANEL: CPT | Performed by: INTERNAL MEDICINE

## 2017-04-18 PROCEDURE — 85027 COMPLETE CBC AUTOMATED: CPT | Performed by: INTERNAL MEDICINE

## 2017-04-18 PROCEDURE — 85610 PROTHROMBIN TIME: CPT | Performed by: INTERNAL MEDICINE

## 2017-04-18 PROCEDURE — 99212 OFFICE O/P EST SF 10 MIN: CPT | Mod: ZF

## 2017-04-18 PROCEDURE — 80048 BASIC METABOLIC PNL TOTAL CA: CPT | Performed by: INTERNAL MEDICINE

## 2017-04-18 PROCEDURE — 36415 COLL VENOUS BLD VENIPUNCTURE: CPT | Performed by: INTERNAL MEDICINE

## 2017-04-18 ASSESSMENT — PAIN SCALES - GENERAL: PAINLEVEL: NO PAIN (0)

## 2017-04-18 NOTE — PROGRESS NOTES
REASON FOR OFFICE VISIT:  Alcoholic cirrhosis.      HISTORY OF PRESENT ILLNESS:  Mr. Varghese is a pleasant 46-year-old male with history of alcoholic cirrhosis complicated by bleeding esophageal varices status post banding and hepatic encephalopathy, presenting for followup.  He was last seen in clinic by Kavita Bach approximately 3 months ago.  He has undergone repeat banding last a few weeks ago without bands needed.  A followup EGD in 6 months was recommended.  His main complaint today is confusion.  This has been ongoing for the past few months.  He was recently seen by his primary care provider with normal ammonia level.  His blood sugars have usually been in the 200-300s.  He has complaints of insomnia and is taking trazodone, Remeron, melatonin and hydroxyzine for this.  He in the past was evaluated at the Sleep Clinic and was told he had borderline sleep apnea.  He has tried taking lactulose at 30 mL daily and with that has had several episodes of diarrhea so is not currently taking anything for encephalopathy.  He is wondering if there is something else that he can take.  He denies fevers, chills, increased abdominal distention, abdominal pain, hematochezia, melena, hematemesis or lower extremity edema.  He was recently seen in Oncology Clinic without evidence of recurrent brain tumor.      MEDICATIONS:  List reviewed.   Current Outpatient Prescriptions   Medication     blood glucose monitoring (ONE TOUCH ULTRA) test strip     HYDROcodone-acetaminophen (NORCO) 5-325 MG per tablet     traZODone (DESYREL) 50 MG tablet     levothyroxine (SYNTHROID/LEVOTHROID) 88 MCG tablet     mirtazapine (REMERON) 15 MG tablet     gabapentin (NEURONTIN) 100 MG capsule     cholecalciferol (VITAMIN  -D) 1000 UNITS capsule     omeprazole (PRILOSEC) 20 MG CR capsule     hydrOXYzine (ATARAX) 25 MG tablet     glipiZIDE (GLUCOTROL XL) 10 MG 24 hr tablet     thiamine 100 MG tablet     cyanocobalamin 1000 MCG TABS     ferrous  sulfate (IRON) 325 (65 FE) MG tablet     pravastatin (PRAVACHOL) 80 MG tablet     lactulose 20 GM/30ML SOLN     sulfamethoxazole-trimethoprim (BACTRIM DS,SEPTRA DS) 800-160 MG per tablet     escitalopram (LEXAPRO) 20 MG tablet     Phenytoin (DILANTIN PO)     Calcium Carb-Cholecalciferol (CALCIUM 500 +D) 500-400 MG-UNIT TABS     hypromellose (ARTIFICIAL TEARS) 0.4 % SOLN     multivitamin, therapeutic with minerals (THERA-VIT-M) TABS     melatonin 5 MG tablet     ONE TOUCH LANCETS MISC     rifaximin (XIFAXAN) 550 MG TABS tablet     No current facility-administered medications for this visit.       ALLERGIES:  No known drug allergies.      REVIEW OF SYSTEMS:  Positive for difficulty sleeping and fatigue.  Otherwise, a 10-point review of systems was performed and was negative except per HPI.      PHYSICAL EXAMINATION:   VITAL SIGNS:  Temperature 97.9, blood pressure 133/82, heart rate 65, 96% on room air.  Weight is 204 pounds.   GENERAL:  Resting comfortably, no apparent distress.   HEENT:  Moist mucous membranes, anicteric.   NECK:  Supple.   LUNGS:  Clear to auscultation bilaterally.   HEART:  Regular rate and rhythm.   ABDOMEN:  Soft, mildly distended.  No fluid wave appreciated, nontender.   EXTREMITIES:  No lower extremity edema.   SKIN:  No jaundice.   NEUROLOGIC:  Alert, answers questions appropriately.  Positive asterixis.      LABORATORY DATA:  Reviewed.  His MELD sodium today is 9.  He has pancytopenia with leukopenia.  Recent ultrasound without focal lesion.  EGD report as above.      IMPRESSION:  A 46-year-old  male with history of alcohol cirrhosis complicated by esophageal varices status post banding and hepatic encephalopathy, presenting for followup.  Due to his history of brain cancer requiring resection and chemotherapy in 2013, he is not a candidate for transplantation.  We can reconsider this once he is 5 years post diagnosis.  Regardless, his MELD continues to be low.  His confusion is  likely secondary to hepatic encephalopathy and polypharmacy.  He is on multiple medications which could cause confusion.  He also has hyperglycemia on laboratories today.  Of note, he has worsening pancytopenia and has been seen by Hematology in the past.  This was attributed to alcoholic liver disease.      RECOMMENDATIONS:   1.  Continue alcohol cessation.  He has now been sober for 2 years.   2.  Repeat upper endoscopy in 6 months.  He was referred for this today to be performed with MAC.   3.  Resume lactulose.  He is to start at a low dose of 10 mL per day and titrate up to achieve a goal of stooling of 3-4 bowel movements per day.  A prescription was sent for rifaximin; however, this requires prior authorization and it is unclear if this will be approved.   4.  Further discussion with primary care provider in regards to reducing polypharmacy and nighttime medications.   5.  Consideration for referral to the Sleep Clinic to evaluate for sleep disorder.  He also needs to work on sleep hygiene.     6.  Repeat ultrasound for HCC screening in 2 months.  This was ordered today.   7.  Bone density scan in 02/2016 was normal.  He is on a multivitamin and vitamin D supplementation.   8.  He is currently on a PPI b.i.d.  This can be reduced to once daily.   9.  Glycemic control.  He had food in the stomach on his last procedure, which is indicative of gastroparesis.     Return to clinic in 6 months, sooner as needed.      This patient was seen and evaluated with Hepatology attending, Dr. Beatriz Tanner.      Dictated by Elizabet Darling MD, Fellow  Attestation:  This patient has been seen and evaluated by me, Beatriz Tanner.  Discussed with the house staff team or resident(s) and agree with the findings and plan in this note.

## 2017-04-18 NOTE — MR AVS SNAPSHOT
After Visit Summary   4/18/2017    Mono Varghese    MRN: [id number]           Patient Information     Date Of Birth          1970        Visit Information        Provider Department      4/18/2017 10:30 AM Elizabet Darling MD [facility] Hepatology        Today's Diagnoses     Hepatic encephalopathy (H)    -  1    Alcoholic cirrhosis of liver without ascites (H)           Follow-ups after your visit        Follow-up notes from your care team     Return for Physical Exam, Lab Work.      Your next 10 appointments already scheduled     Oct 18, 2017  9:00 AM CDT   Lab with  LAB   [facility] ([facility])    [address] [place] [id number] [phone]            Oct 18, 2017  9:50 AM CDT   (Arrive by 9:35 AM)   Return General Liver with Beatriz Tanner MD   [facility] Hepatology ([facility])    [address] [place] [id number] [phone]              Who to contact     If you have questions or need follow up information about today's clinic visit or your schedule please contact [facility] HEPATOLOGY directly at [phone].  Normal or non-critical lab and imaging results will be communicated to you by MyChart, letter or phone within 4 business days after the clinic has received the results. If you do not hear from us within 7 days, please contact the clinic through OpenRenthart or phone. If you have a critical or abnormal lab result, we will notify you by phone as soon as possible.  Submit refill requests through WatchFrog or call your pharmacy and they will forward the refill request to us. Please allow 3 business days for your refill to be completed.          Additional Information About Your Visit        MyChart Information     WatchFrog gives you secure access to your electronic health record. If you see a primary care provider, you can also send messages to your care team and make  "appointments. If you have questions, please call your primary care clinic.  If you do not have a primary care provider, please call [phone] and they will assist you.        Care EveryWhere ID     This is your Care EveryWhere ID. This could be used by other organizations to access your [facility] medical records  [id number]        Your Vitals Were     Pulse Temperature Height Pulse Oximetry BMI (Body Mass Index)       65 97.9  F (36.6  C) (Oral) 1.778 m (5' 10\") 96% 29.27 kg/m2        Blood Pressure from Last 3 Encounters:   04/18/17 133/82   04/05/17 132/86   03/30/17 96/56    Weight from Last 3 Encounters:   04/18/17 92.5 kg (204 lb)   04/05/17 93.9 kg (207 lb)   03/30/17 91.6 kg (202 lb)              We Performed the Following     UPPER GI ENDOSCOPY          Today's Medication Changes          These changes are accurate as of: 4/18/17 11:59 PM.  If you have any questions, ask your nurse or doctor.               Start taking these medicines.        Dose/Directions    rifaximin 550 MG Tabs tablet   Commonly known as:  XIFAXAN   Used for:  Hepatic encephalopathy (H)   Started by:  Elizabet Darling MD        Dose:  550 mg   Take 1 tablet (550 mg) by mouth 2 times daily   Quantity:  60 tablet   Refills:  3         These medicines have changed or have updated prescriptions.        Dose/Directions    Calcium Carb-Cholecalciferol 500-400 MG-UNIT Tabs   Commonly known as:  calcium 500 +D   This may have changed:  when to take this   Used for:  Malnutrition (H)        Dose:  500 mg   Take 500 mg by mouth daily   Quantity:  90 tablet   Refills:  1       cholecalciferol 1000 UNITS capsule   Commonly known as:  vitamin  -D   This may have changed:  when to take this   Used for:  Malnutrition (H)        Dose:  1 capsule   Take 1 capsule (1,000 Units) by mouth daily   Quantity:  60 capsule   Refills:  0       cyanocobalamin 1000 MCG Tabs   This may have changed:  how to take this   Used for:  Alcoholic cirrhosis of liver " with ascites (H)        Dose:  1000 mcg   1,000 mcg by Per G Tube route daily   Quantity:  130 tablet   Refills:  1       escitalopram 20 MG tablet   Commonly known as:  LEXAPRO   This may have changed:  when to take this   Used for:  Mild episode of recurrent major depressive disorder (H)        Dose:  20 mg   Take 1 tablet (20 mg) by mouth daily   Quantity:  90 tablet   Refills:  3       glipiZIDE 10 MG 24 hr tablet   Commonly known as:  GLUCOTROL XL   This may have changed:  when to take this   Used for:  DM type 2, goal HbA1c 7%-8% (H)        Dose:  10 mg   Take 1 tablet (10 mg) by mouth daily   Quantity:  30 tablet   Refills:  3       lactulose 20 GM/30ML Soln   This may have changed:  when to take this   Used for:  Hepatic encephalopathy (H)        Dose:  30 mL   Take 30 mLs by mouth every morning   Quantity:  946 mL   Refills:  11       pravastatin 80 MG tablet   Commonly known as:  PRAVACHOL   This may have changed:  when to take this   Used for:  High cholesterol        Dose:  80 mg   Take 1 tablet (80 mg) by mouth daily   Quantity:  90 tablet   Refills:  1       sulfamethoxazole-trimethoprim 800-160 MG per tablet   Commonly known as:  BACTRIM DS/SEPTRA DS   This may have changed:  when to take this   Used for:  Spontaneous bacterial peritonitis (H)        Dose:  1 tablet   Take 1 tablet by mouth 2 times daily   Quantity:  60 tablet   Refills:  5       thiamine 100 MG tablet   This may have changed:  when to take this   Used for:  Alcoholic cirrhosis of liver with ascites (H)        Dose:  100 mg   Take 1 tablet (100 mg) by mouth daily   Quantity:  100 tablet   Refills:  1            Where to get your medicines      These medications were sent to [facility] [address] [place] [address] [facility]  [address] [facility] [address]     Phone:  [phone]     rifaximin 550 MG Tabs tablet                Primary Care Provider Office Phone # Fax #    [place] [phone]        [facility] [address] [place] [address] [address]  [facility] [address]        Thank you!     Thank you for choosing [facility] HEPATOLOG[facility]  for your care. Our goal is always to provide you with excellent care. Hearing back from our patients is one way we can continue to improve our services. Please take a few minutes to complete the written survey that you may receive in the mail after your visit with us. Thank you!             Your Updated Medication List - Protect others around you: Learn how to safely use, store and throw away your medicines at www.disposemymeds.org.          This list is accurate as of: 4/18/17 11:59 PM.  Always use your most recent med list.                   Brand Name Dispense Instructions for use    blood glucose monitoring test strip    ONE TOUCH ULTRA    300 each    Use to test blood sugars 4 times daily as needed or as directed.       Calcium Carb-Cholecalciferol 500-400 MG-UNIT Tabs    calcium 500 +D    90 tablet    Take 500 mg by mouth daily       cholecalciferol 1000 UNITS capsule    vitamin  -D    60 capsule    Take 1 capsule (1,000 Units) by mouth daily       cyanocobalamin 1000 MCG Tabs     130 tablet    1,000 mcg by Per G Tube route daily       DILANTIN PO      Take 30 mg by mouth every morning       escitalopram 20 MG tablet    LEXAPRO    90 tablet    Take 1 tablet (20 mg) by mouth daily       ferrous sulfate 325 (65 FE) MG tablet    IRON    200 tablet    Take 1 tablet (325 mg) by mouth 2 times daily       gabapentin 100 MG capsule    NEURONTIN    30 capsule    Take 1 capsule (100 mg) by mouth At Bedtime       glipiZIDE 10 MG 24 hr tablet    GLUCOTROL XL    30 tablet    Take 1 tablet (10 mg) by mouth daily       HYDROcodone-acetaminophen 5-325 MG per tablet    NORCO    60 tablet    Take 2 tablets by mouth every 6 hours as needed for moderate to severe pain       hydrOXYzine 25 MG tablet    ATARAX    180 tablet    Take 1 tablet (25 mg) by mouth 2 times daily       hypromellose 0.4 %  Soln ophthalmic solution    ARTIFICIAL TEARS    1 Bottle    Place 2 drops into both eyes every 8 hours       lactulose 20 GM/30ML Soln     946 mL    Take 30 mLs by mouth every morning       levothyroxine 88 MCG tablet    SYNTHROID/LEVOTHROID    90 tablet    Take 1 tablet (88 mcg) by mouth daily       melatonin 5 MG tablet      Take 5 mg by mouth nightly as needed for sleep       mirtazapine 15 MG tablet    REMERON    30 tablet    Take 7.5 mg (1/2 tablet) at bedtime for 1 week, THEN increase to 15 mg (1 tablet) at bedtime.       multivitamin, therapeutic with minerals Tabs tablet      Take 1 tablet by mouth 2 times daily       omeprazole 20 MG CR capsule    priLOSEC    120 capsule    Take 2 capsules (40 mg) by mouth 2 times daily       ONE TOUCH LANCETS Misc     100 each    by In Vitro route 4 times daily as needed       pravastatin 80 MG tablet    PRAVACHOL    90 tablet    Take 1 tablet (80 mg) by mouth daily       rifaximin 550 MG Tabs tablet    XIFAXAN    60 tablet    Take 1 tablet (550 mg) by mouth 2 times daily       sulfamethoxazole-trimethoprim 800-160 MG per tablet    BACTRIM DS/SEPTRA DS    60 tablet    Take 1 tablet by mouth 2 times daily       thiamine 100 MG tablet     100 tablet    Take 1 tablet (100 mg) by mouth daily       traZODone 50 MG tablet    DESYREL    135 tablet    Take 1.5 tablets (75 mg) by mouth nightly as needed for sleep

## 2017-04-18 NOTE — LETTER
4/18/2017       RE: Mono Varghese  [address] [facility] [address]     Dear Colleague,    Thank you for referring your patient, Mono Varghese, to the [facility] HEPATOLOGY at [facility]. Please see a copy of my visit note below.    REASON FOR OFFICE VISIT:  Alcoholic cirrhosis.      HISTORY OF PRESENT ILLNESS:  Mr. Varghese is a pleasant 46-year-old male with history of alcoholic cirrhosis complicated by bleeding esophageal varices status post banding and hepatic encephalopathy, presenting for followup.  He was last seen in clinic by Kavita Bach approximately 3 months ago.  He has undergone repeat banding last a few weeks ago without bands needed.  A followup EGD in 6 months was recommended.  His main complaint today is confusion.  This has been ongoing for the past few months.  He was recently seen by his primary care provider with normal ammonia level.  His blood sugars have usually been in the 200-300s.  He has complaints of insomnia and is taking trazodone, Remeron, melatonin and hydroxyzine for this.  He in the past was evaluated at the Sleep Clinic and was told he had borderline sleep apnea.  He has tried taking lactulose at 30 mL daily and with that has had several episodes of diarrhea so is not currently taking anything for encephalopathy.  He is wondering if there is something else that he can take.  He denies fevers, chills, increased abdominal distention, abdominal pain, hematochezia, melena, hematemesis or lower extremity edema.  He was recently seen in Oncology Clinic without evidence of recurrent brain tumor.      MEDICATIONS:  List reviewed.   Current Outpatient Prescriptions   Medication     blood glucose monitoring (ONE TOUCH ULTRA) test strip     HYDROcodone-acetaminophen (NORCO) 5-325 MG per tablet     traZODone (DESYREL) 50 MG tablet     levothyroxine (SYNTHROID/LEVOTHROID) 88 MCG tablet     mirtazapine (REMERON) 15 MG tablet     gabapentin  (NEURONTIN) 100 MG capsule     cholecalciferol (VITAMIN  -D) 1000 UNITS capsule     omeprazole (PRILOSEC) 20 MG CR capsule     hydrOXYzine (ATARAX) 25 MG tablet     glipiZIDE (GLUCOTROL XL) 10 MG 24 hr tablet     thiamine 100 MG tablet     cyanocobalamin 1000 MCG TABS     ferrous sulfate (IRON) 325 (65 FE) MG tablet     pravastatin (PRAVACHOL) 80 MG tablet     lactulose 20 GM/30ML SOLN     sulfamethoxazole-trimethoprim (BACTRIM DS,SEPTRA DS) 800-160 MG per tablet     escitalopram (LEXAPRO) 20 MG tablet     Phenytoin (DILANTIN PO)     Calcium Carb-Cholecalciferol (CALCIUM 500 +D) 500-400 MG-UNIT TABS     hypromellose (ARTIFICIAL TEARS) 0.4 % SOLN     multivitamin, therapeutic with minerals (THERA-VIT-M) TABS     melatonin 5 MG tablet     ONE TOUCH LANCETS MISC     rifaximin (XIFAXAN) 550 MG TABS tablet     No current facility-administered medications for this visit.       ALLERGIES:  No known drug allergies.      REVIEW OF SYSTEMS:  Positive for difficulty sleeping and fatigue.  Otherwise, a 10-point review of systems was performed and was negative except per HPI.      PHYSICAL EXAMINATION:   VITAL SIGNS:  Temperature 97.9, blood pressure 133/82, heart rate 65, 96% on room air.  Weight is 204 pounds.   GENERAL:  Resting comfortably, no apparent distress.   HEENT:  Moist mucous membranes, anicteric.   NECK:  Supple.   LUNGS:  Clear to auscultation bilaterally.   HEART:  Regular rate and rhythm.   ABDOMEN:  Soft, mildly distended.  No fluid wave appreciated, nontender.   EXTREMITIES:  No lower extremity edema.   SKIN:  No jaundice.   NEUROLOGIC:  Alert, answers questions appropriately.  Positive asterixis.      LABORATORY DATA:  Reviewed.  His MELD sodium today is 9.  He has pancytopenia with leukopenia.  Recent ultrasound without focal lesion.  EGD report as above.      IMPRESSION:  A 46-year-old  male with history of alcohol cirrhosis complicated by esophageal varices status post banding and hepatic  encephalopathy, presenting for followup.  Due to his history of brain cancer requiring resection and chemotherapy in 2013, he is not a candidate for transplantation.  We can reconsider this once he is 5 years post diagnosis.  Regardless, his MELD continues to be low.  His confusion is likely secondary to hepatic encephalopathy and polypharmacy.  He is on multiple medications which could cause confusion.  He also has hyperglycemia on laboratories today.  Of note, he has worsening pancytopenia and has been seen by Hematology in the past.  This was attributed to alcoholic liver disease.      RECOMMENDATIONS:   1.  Continue alcohol cessation.  He has now been sober for 2 years.   2.  Repeat upper endoscopy in 6 months.  He was referred for this today to be performed with MAC.   3.  Resume lactulose.  He is to start at a low dose of 10 mL per day and titrate up to achieve a goal of stooling of 3-4 bowel movements per day.  A prescription was sent for rifaximin; however, this requires prior authorization and it is unclear if this will be approved.   4.  Further discussion with primary care provider in regards to reducing polypharmacy and nighttime medications.   5.  Consideration for referral to the Sleep Clinic to evaluate for sleep disorder.  He also needs to work on sleep hygiene.     6.  Repeat ultrasound for HCC screening in 2 months.  This was ordered today.   7.  Bone density scan in 02/2016 was normal.  He is on a multivitamin and vitamin D supplementation.   8.  He is currently on a PPI b.i.d.  This can be reduced to once daily.   9.  Glycemic control.  He had food in the stomach on his last procedure, which is indicative of gastroparesis.     Return to clinic in 6 months, sooner as needed.      This patient was seen and evaluated with Hepatology attending, Dr. Beatriz Tanner.      Dictated by Elizabet Darling MD, Fellow  Attestation:  This patient has been seen and evaluated by me, Beatriz Tanner.   Discussed with the house staff team or resident(s) and agree with the findings and plan in this note.       Again, thank you for allowing me to participate in the care of your patient.      Sincerely,    Elizabet Darling MD

## 2017-04-18 NOTE — NURSING NOTE
"Chief Complaint   Patient presents with     RECHECK     follow up liver failure       Initial /82  Pulse 65  Temp 97.9  F (36.6  C) (Oral)  Ht 1.778 m (5' 10\")  Wt 92.5 kg (204 lb)  SpO2 96%  BMI 29.27 kg/m2 Estimated body mass index is 29.27 kg/(m^2) as calculated from the following:    Height as of this encounter: 1.778 m (5' 10\").    Weight as of this encounter: 92.5 kg (204 lb).  Medication Reconciliation: complete  "

## 2017-04-19 ENCOUNTER — TELEPHONE (OUTPATIENT)
Dept: GASTROENTEROLOGY | Facility: CLINIC | Age: 47
End: 2017-04-19

## 2017-04-19 DIAGNOSIS — K76.82 HEPATIC ENCEPHALOPATHY (H): ICD-10-CM

## 2017-04-19 NOTE — TELEPHONE ENCOUNTER
[facility] Prior Authorization Team   Phone: [phone]  Fax: [phone]    PA Initiation    Medication: Xifaxan 550mg  Insurance Company: [employer] Part D - Phone [phone] Fax [phone]  Pharmacy Filling the Rx: [facility] [address] [place], [place] - [address] [facility]  Filling Pharmacy Phone: [phone]  Filling Pharmacy Fax: [phone]  Start Date: 4/19/2017    INITIATED VIA PHONE

## 2017-04-19 NOTE — TELEPHONE ENCOUNTER
Prior Authorization Approval    Authorization Effective Date: 1/19/2017  Authorization Expiration Date: 10/19/2017  Medication: Xifaxan 550mg-APPROVED  Approved Dose/Quantity:    Reference #: [id number]   Insurance Company: Silver [employer] Part D - Phone [phone] Fax [phone]  Expected CoPay: $0.00     CoPay Card Available:      Foundation Assistance Needed:    Which Pharmacy is filling the prescription (Not needed for infusion/clinic administered): [facility] [address] [place] [id number] [facility]  Pharmacy Notified: Yes  Patient Notified: Yes

## 2017-04-19 NOTE — TELEPHONE ENCOUNTER
Prior Authorization Specialty Medication Request    Medication/Dose: Xifaxan 550mg  Diagnosis and ICD: Hepatic encephalopathy k72.90  New/Renewal/Insurance Change PA: Renewal    Important Lab Values: AST 59; Alk Phos 255    Previously Tried and Failed Therapies: lactulose alone    Rationale: Xifaxan helps to lower the toxin build up in the blood while lactulose works by cleansing the toxin build up in the liver. Adjunctive therapy.    Would you like to include any research articles? Yes   If yes please include the hyperlink(s) below or fax @ [phone].    http://onlinelibrary.knight.com/doi/10.1111/denzel.96121/full    If you received a fax notification from an outside Pharmacy;  Pharmacy Name:  Pharmacy #:  Pharmacy Fax:

## 2017-04-20 ENCOUNTER — MYC REFILL (OUTPATIENT)
Dept: FAMILY MEDICINE | Facility: CLINIC | Age: 47
End: 2017-04-20

## 2017-04-20 DIAGNOSIS — E78.00 HIGH CHOLESTEROL: ICD-10-CM

## 2017-04-20 DIAGNOSIS — F41.9 ANXIETY: ICD-10-CM

## 2017-04-20 DIAGNOSIS — L29.9 ITCHING: ICD-10-CM

## 2017-04-23 RX ORDER — PRAVASTATIN SODIUM 80 MG/1
80 TABLET ORAL DAILY
Qty: 90 TABLET | Refills: 1 | Status: SHIPPED | OUTPATIENT
Start: 2017-04-23 | End: 2017-08-10

## 2017-04-23 RX ORDER — HYDROXYZINE HYDROCHLORIDE 25 MG/1
25 TABLET, FILM COATED ORAL 2 TIMES DAILY
Qty: 180 TABLET | Refills: 1 | Status: SHIPPED | OUTPATIENT
Start: 2017-04-23 | End: 2017-09-15

## 2017-04-23 NOTE — TELEPHONE ENCOUNTER
pravastatin (PRAVACHOL) 80 MG tablet   Last Written Prescription Date:  10/28/16  Last Fill Quantity: 90,   # refills: 1  Last Office Visit with G, P or [facility] prescribing provider: 4/5/17  Future Office visit:   None.    hydrOXYzine (ATARAX) 25 MG tablet      Last Written Prescription Date:  12/15/16  Last Fill Quantity: 180,   # refills: 1

## 2017-04-24 RX ORDER — HYDROXYZINE HYDROCHLORIDE 25 MG/1
TABLET, FILM COATED ORAL
Qty: 60 TABLET | Refills: 0 | OUTPATIENT
Start: 2017-04-24

## 2017-04-25 RX ORDER — HYDROCODONE BITARTRATE AND ACETAMINOPHEN 5; 325 MG/1; MG/1
2 TABLET ORAL EVERY 6 HOURS PRN
Qty: 60 TABLET | Refills: 0 | Status: SHIPPED | OUTPATIENT
Start: 2017-04-25 | End: 2017-06-15

## 2017-04-25 NOTE — TELEPHONE ENCOUNTER
Message from To8tohart:  Original authorizing provider: MD Mono Cervantes would like a refill of the following medications:  HYDROcodone-acetaminophen (NORCO) 5-325 MG per tablet [[place] MD]    Preferred pharmacy: [address] [address] [facility]    Comment:

## 2017-04-27 ENCOUNTER — DOCUMENTATION ONLY (OUTPATIENT)
Dept: CARE COORDINATION | Facility: CLINIC | Age: 47
End: 2017-04-27

## 2017-04-27 DIAGNOSIS — E11.9 DM TYPE 2, GOAL HBA1C 7%-8% (H): ICD-10-CM

## 2017-04-27 NOTE — PROGRESS NOTES
[facility] now requests orders and shares plan of care/discharge summaries for some patients through Office Center.  Please REPLY TO THIS MESSAGE in order to give authorization for orders when needed.  This is considered a verbal order, you will still receive a faxed copy of orders for signature.  Thank you for your assistance in improving collaboration for our patients.    Patient Update:    Client has missed 3AM doses and 1PM dose of medications over this past week.  BG today is 344.  Client has been inconsistently taking BG.  Please advise on frequency this should be checked.  Appetite has been decreased. Client is often drinking a lot of diet soda and getting full from fluids and therefore not eating well.  Education has been provided on small, frequent meals and watching carbs/sugar in diet.  Client was also started on Xifaxan today as this was approved by insurance.     Please advise.    Angela Manley RN    [phone]  [email or web address]

## 2017-04-28 RX ORDER — GLIPIZIDE 10 MG/1
10 TABLET, FILM COATED, EXTENDED RELEASE ORAL DAILY
Qty: 90 TABLET | Refills: 1 | Status: SHIPPED | OUTPATIENT
Start: 2017-04-28 | End: 2017-11-02

## 2017-04-28 NOTE — TELEPHONE ENCOUNTER
GLIPIZIDE ER 10MG       Last Written Prescription Date:  4/5/17  Last Fill Quantity: 30  # refills: 3  Last Office Visit : 4/5/17  Future Office visit:  NONE  Creatinine   Date Value Ref Range Status   04/18/2017 1.01 0.66 - 1.25 mg/dL Final     Lab Results   Component Value Date    A1C 6.6 03/03/2017     Lab Results   Component Value Date    MICROL 6 12/09/2016

## 2017-05-04 NOTE — PROGRESS NOTES
Oncology Follow-up Visit:   May 5, 2017    Diagnosis: Left parietal oligoastrocytoma, WHO grade 3; predominantly astrocytic, and less than 10% of the specimen was oligodendroglioma. status post subtotal resection by Dr. J Carlos Aranda in early 06/2013. Negative for 1p/19q deletions.  Concurrent chemoradiation July 15 through August 23, 2013.   Initiated adjuvant oral temozolomide 9/17/13; switched to IV temozolomide due to insurance coverage. Completed adjuvant temozolomide chemotherapy Feb 2014.  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  Neurosurgeon: Dr J Carlos Aranda  Radiation Oncologist: Dr Jennyfer Martinez     Oncology History:   The patient had not really sought longitudinal medical care in the past. In autumn of 2012, he established primary care with Dr. Louis in the [facility]. He was found to have hypertension and diabetes type 2. He is a former smoker, and he also developed some depression over the following 6 months. On 04/17/2013, he presented to the emergency room at the [facility] with an episode of right-sided weakness that was transient. He had lack of coordination, dysarthria and an odd sensation in the right hand. His wife today describes it as what she calls a seizure. He was sitting at a computer when he had the sudden onset of difficulty controlling moving the right arm. He fell out of the chair at home. The symptoms self resolved after 20 minutes. He was presumed to have a TIA. He was taken to the emergency room by his wife. He was noted to have a transient right-sided weakness and dysarthria, although he had a normal exam on admission. He was admitted as part of a stroke code, presumed to have a TIA. MRI was performed to rule out stroke. This revealed a left parietal lobe mass. There was expansion in the gyri and T2-FLAIR hyperintensity on that side. He was hospitalized for further evaluation. He was placed on Keppra  prophylactically by the neurologist, Dr. Willoughby, at 750 mg b.i.d. The transient cells had been presumed to be seizure activity once the tumor was diagnosed in the left parietal lobe. It was presumed secondary to that mass.   MRI brain was done on 04/17/2013 with and without gadolinium contrast to further elucidate this new finding. There was a large area of the left cerebral hemisphere with dissipated signal abnormality with minimal contrast enhancement. It was concerning for either a low-grade glioma versus an oligodendroglioma. There were some abnormalities consistent with glioma in the right parietal region. He was evaluated by the Neurosurgery Team during that hospital admission and evaluated for possible biopsy or resection. During that hospitalization, he was on the Vascular Neurology Service. The stroke workup was suspended once the infiltrative glioma was detected and aspirin was stopped. The patient had no further deficits on exam. He was discharged on 750 mg b.i.d. dosing of Keppra.   The patient met with Dr. J Carlos Aranda in the Outpatient Neurosurgery Clinic on 05/06/2013 for discussion of left parietal glioma. They discussed observation, biopsy versus surgical excision. The decision was made to move forward with attempt for maximal safe resection versus biopsy. The plan was to obtain a functional MRI. The patient was found to be thrombocytopenic with borderline anemia and leukopenia on 05/09/2013.   The patient met with Dr. Rafael Rodriguez, one of our hematologists here at the [facility]. Dr. Rodriguez wrote an extensive detailed note reviewing the patient's medications. He felt the Seroquel the patient had been on for depression was potentially mildly myelosuppressive, but it would have been uncommon to have such a alisha specific with platelets while on that dose. Another concern was that the patient had been having borderline low counts for years, and perhaps had not been detected as the patient  had not been seeking regular medical care. He felt the glioma would only extremely rarely involve bone marrow and result in cytopenia, and he felt the Keppra would have been an unusual presentation; however, rarely as well, the possibility of myelodysplastic syndrome or concurrent aplastic anemia could not be excluded.   A followup MRI was obtained on 05/16/2013 in anticipation of surgery on 06/06/2013. The patient went forward with Stealth-assisted left parietal craniotomy and tumor resection by Dr. J Carlos Aranda. The patient had been started 2 days preoperatively on dexamethasone 6 mg b.i.d., and in addition prednisone 1 mg/kg per day at the recommendation per Dr. Rodriguez for the pancytopenia representing possibly welcomed compensated ITP.   Dr. Aranda performed an excisional left parietooccipital tumor. Per his operative report, he noted piecemeal resection for what possibly is the result of a gross total resection. On pathology, there were some mixed features of glioma with oligoastrocytoma, and 1p/19q deletions were assessed. There was no evidence of deletions in either 1p nor 19q. The tumor was unusual in that it was predominantly astrocytic, and less than 10% of the specimen was oligodendroglioma. 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 There were only rare mitotic figures present. There was no evidence of tumor necrosis or neurovascular proliferation. Postop MRI shows status post left craniotomy with only some residual signal abnormalities, likely representing residual tumor. It could have been a subtotal resection as well. Postoperatively, the patient developed steroid-induced hyperglycemia requiring insulin drip.   From 06/11 through 06/21, he was hospitalized at a rehabilitation facility for mild weakness in the right arm and difficulties with speech. He was having expressive aphasia in the postoperative  setting, mild dysarthria, right-sided hemiparesis, right-sided neglect. The patient has undergone excellent PT and OT, which continues at home since his discharge. He was discharged to home on 06/21/2013 on prophylactic Keppra. What had been characterized as a right lower extremity involuntary twitching was all prior to surgery, and he has had no seizure activity or loss of consciousness or any tonic-clonic seizure-like activity since surgery and surgical removal of the tumor. Patient met with Dr. Martinez from Radiation Oncology with plans to start postoperative radiation on or around Monday 07/08/2013.   6/27/13--Neuro-Oncology/medical oncology consultation, Dr Lauro Shaw.  7/5/13-Palliative Care consult/counseling - FRANKLYN Ogden. Restarted Cymbalta.  7/15/13--Neuro-surgery clinic follow-up, Dr Aranda.  7/23/13--Oncology follow-up, JOSE Abarca.   7/25/13--Hematology follow-up, Dr Rodriguez, re: ITP.  8/13/13-- Oncology follow-up, JOSE Abarca. worsening expressive aphasia and left hand tremors. Dexamethasone increased to 4 mg po bid.   RADIATION COURSE: ~7/15/13- -8/23/13.  Treatment Summary   Site: Partial Brain  Current Dose: 6000 / 6000 cGy  Fractions: 30/30 8/29/13--Oncology follow-up, JOSE Abarca. patient ran out of temozolomide final week of radiation - did not take this medication. Patient started on Keflex x 7 days for wound dehiscence.  8/30/13-- Neuro-surgery clinic follow-up, Dr Aranda. Evaluation of craniotomy incision. Assessment: Scalp wound dehiscence, possible wound infection.   9/6/13-- Neuro-surgery clinic follow-up, Dr Aranda. Evaluation of craniotomy incision. Assessment: early healing secondarily.  9/6/13--MRI brain--(two weeks following completion of chemoXRT): Impression: Surgical cavity in the left parietal lobe, with areas of enhancement at the margins of the surgical cavity and areas of abnormal T2 signal in the corpus callosum and left cerebral hemisphere. These  findings are unchanged compared to the previous scan. The areas of T2 signal abnormality around the surgical cavity could represent areas of infiltrative tumor, edema, or radiation  induced changes. However, as previously mentioned, since the primary tumor demonstrated only mild contrast enhancement, this enhancement is favored to represent treatment related change. Continued followup is recommended.  9/16/13--Neuro-Oncology/medical oncology follow-up, Dr Shaw. Proceed with cycle 1/day 1 adjuvant 5-day temozolomide (150 mg/m2 daily on days 1-5 with  first cycle; then 200 mg/m2 daily on days 1-5 starting with cycle 2).  9/19/13--Hematology follow-up, Dr Rodriguez. Reason: history of thrombocytopenia.  10/14/13--[facility] follow-up, Dr Aranda.   10/14/13--Oncology follow-up, JOSE Abarca. cycle 2/day 1 adjuvant 5-day temozolomide  10/14/13-MRI brain---stable-- Resection cavity in the left parieto-occipital region with areas of  enhancement along the margin of the surgical cavity not significantly changed since prior study. Interval decrease in T2 hyperintense signal in the left frontoparietal subcortical and periventricular white matter, likely representing decreasing edema. No new areas of abnormal enhancement are identified. Stable small extra-axial fluid collection deep to the craniotomy site.  11/11/13- Neuro-Oncology/medical oncology follow-up, Dr Shaw.  cycle 3/day 1 adjuvant 5-day temozolomide.  12/3/13--Cycle 4/day 1 adjuvant temozolomide---switched to IV (Days 1-5) due to lack of insurance coverage for oral medication.  12/6/13--Nutrition consultation--Judy Valdez RD.   12/9/13--Oncology follow-up, JOSE Abarca.  patient c/o worsened short-term memory and expressive aphasia/word-finding difficulties. Started Megace 400mg daily for appetite/anorexia.  12/19/13--Brain MRI---- Impression:  1. The left parietooccipital region resection cavity with persistent areas of nodular enhancement along the  resection cavity are not significantly changed since 10/14/2013 but are slightly less  pronounced when compared to 8/21/2013; these findings likely reflect post therapy changes.  2. Worsening T2 hyperintense signal abnormality in the splenium and adjacent periatrial white matter bilaterally (left greater than right); uncertain whether this could represent worsening primary brain tumor or if this could be therapy related.  12/26/13--Oncology follow-up, Dr Shaw.  1/10/14--Nutrition consultation--Judy Valdez RD.    1/30/14--Oncology follow-up, JOSE Abarca.    2/6/14--cycle 6/day 1 temozolomide IV.  2/17/14--Palliative care follow-up, Dr Napoles.  Referral to neuropsychiatry.  2/17/14--MRI brain--- 1. Stable left parieto-occipital resection cavity. There is persistent  areas of curvilinear enhancement along the margins of resection  cavity, not significantly changed since most MRI.  2. Stable T2 hyperintensity adjacent to the resection cavity extending  into the splenium of the corpus callosum and the right periatrial  white matter medially, left frontal periventricular white matter  superiorly. These may represent edema/gliosis,however tumor  infiltration cannot be completely ruled out.  2/20/14--oncology follow-up, Dr Shaw. Plan - start active surveillance; discontinue IV temozolomide due to completion of planned 6-month course.  3/20/14--NO-SHOW to scheduled oncology follow-up, JOSE Abarca.  3/25/14--NO-SHOW to scheduled oncology follow-up, JOSE Abarca.  3/27/14-- oncology follow-up, JOSE Abarca. concern for anhedonia, depression, question of possible seizure activity. Referred for Psychiatric assessment.  4/7/14--brain MRI---Postsurgical changes over the left parietal region, with a stable surgical cavity in the left parietal lobe.  4/14/14-- oncology follow-up, Dr Shaw.  5/7/14 - - neurology consultation, Dr. Devendra Quiroz; Dr Patrick Rogers. 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 Initiate Depakote 500 mg b.i.d.; EEG; consider discontinuation of Wellbutrin.  6/5/14-- primary care follow-up, Dr. King Louis. Recs: trazodone increased for insomnia; wean off Wellbutrin over one month; increased Cymbalta.  6/9/14 - - EEG performed. Results: abnormal EEG due to the presence of asymmetry of the background activity with left hemisphere theta slowing, which is consistent with patient's history of surgery. No epileptiform activities were observed.  6/9/14-- brain MRI: Impression: Stable post surgical changes of left parietal craniotomy   and WHO grade 3 astrocytoma resection. FLAIR hyperintensity  surrounding the resection cavity, extending anteriorly is unchanged.  Curvilinear contrast enhancement along the anterior inferior margin of   the resection cavity is also little changed.   6/19/14 - - oncology follow-up, Dr. Shaw.  Summer 2014: patient was undergoing home occupational therapy, but declined further assistance.  9/8/14-- brain MRI done for surveillance of brain tumor: Impression:   1. Stable postoperative changes compared to MRI study on 6/9/2014.   2. Superior to the surgical cavity there is a gyrus with T2 FLAIR  hyperintensity (series 401 and image 22), may represent residual tumor  however has been decreasing in expansion and conspicuity since the   previous several studies.   3. FLAIR hyperintense in the white matter adjacent to surgical cavity,  stable, may represent gliosis and/or low grade infiltrative tumor.   Continued follow up is recommended.  9/8/14 - - ER evaluation for nausea and vomiting, speech deficit, and extremity weakness. Family reported new right-sided weakness and speech difficulty. MRI repeated with stroke protocol - - no evidence of stroke. Neurology was consulted: symptoms potentially due to seizure activity, in patient with known history of seizures in brain tumor. Plan was to admit patient to neurology service for  EEG.  9/8/14-- brain MRI done in ER to rule out stroke: IMPRESSION   Impression: No evidence of stroke.   9/11/14 - - EEG performed: IMPRESSION: This is an abnormal video EEG due to the presence of mild diffuse nonspecific encephalopathy. There was an additional polymorphic delta activity over the left hemisphere and slight increased amplitude there (breach effect), consistent with the patient's history of craniotomy and resection of astrocytoma. No electrographic seizure or paroxysmal behavioral events were recorded.  9/15/14 - - neuro-oncology/oncology follow-up, Dr. Shaw.   9/11/14 - - EEG performed: IMPRESSION: This is an abnormal video EEG due to the presence of mild diffuse nonspecific encephalopathy. There was an additional polymorphic delta activity over the left hemisphere and slight increased amplitude there (breach effect), consistent with the patient's history of craniotomy and resection of astrocytoma. No electrographic seizure or paroxysmal behavioral events were recorded.   9/15/14 - - neuro-oncology/oncology follow-up, Dr. Shaw.   September 2014 - - patient reportedly developed a full-blown generalized tonic clonic seizure in his right arm and hand, spreading to face and legs became generalized. He was started on Depakote 500 mg b.i.d.  10/6/14-- neurology clinic consultation, Dr. English. Plan: remain on Depakote.  12/15/14 - - telephone note documenting increased short-term memory loss over two weeks.  12/20/14--brain MRI--Impression:   1. Stable postoperative changes of a left parietal resection cavity.   Redemonstration of T2 hyperintensity in the gyrus immediately superior   to the resection cavity without corresponding contrast enhancement.   Findings are stable since MR on 9/8/2014. However, differential still   includes gliosis versus residual tumor bed.   2. There is also unchanged T2 hyperintensity within the white matter   adjacent to the surgical resection site which may have minimally    decreased in conspicuity since prior study. Consider gliosis versus   residual neoplastic process.   12/22/14 - - neuro-oncology/oncology follow-up, Dr. Shaw. Patient was a no-show to this scheduled appointment.  1/5/15 - - neurology clinic follow-up, Dr. English. Assessment: alcohol abuse, recognition to cut down. Resume Depakote 500 mg BID for seizures.  1/5/15 - - oncology follow-up, JOSE Abarca. Patient continues short-term memory loss and needs help medications.  5/1/15 - - oncology follow-up, JOSE Abarca. Early evaluation for left temporal headaches.  5/1/15 - - brain MRI: Impression:   1. Left parietal resection cavity without evidence of tumor recurrence.   2. Persistent T2 white matter changes in both cerebral hemispheres, left greater than right, likely treatment related change.  5/6/15 - - psychiatry follow-up.  5/11/2015 - - neuro-oncology/oncology follow-up, Dr. Shaw. NO-SHOW.  6/13/15 - - ER evaluation for seizure, in form of right arm shaking and slurred speech.  7/13/15 - - neurology consultation, Dr. Ingrid Schulte. Plan: EEG, check valproate level.  9/10/15 - - brain MRI: IMPRESSION  Impression: Left parietal resection cavity without evidence of tumor  recurrence.  9/14/15 - - neuro-oncology/oncology follow-up, Dr. Shaw.  2/29/16--brain MRI - Impression: Unchanged postoperative changes of the parietal craniotomy and left oligoastrocytoma resection. No evidence for recurrent tumor.  3/7/16-- neuro-oncology/oncology follow-up, Dr. Shaw.  2/29/16--brain MRI - Impression: Unchanged postoperative changes of the parietal craniotomy.  3/1/17--brain MRI: Impression:   1. No significant change in T2 hyperintense signal about the left  parietal resection cavity dating back to 12/20/2014. No evidence of  tumor progression.  2. Stable posttreatment related white matter changes since 2/29/2016.  3/6/17-- neuro-oncology follow up, Dr. Shaw. Patient no-show.  5/5/17 - rescheduled neuro-oncology follow-up,  Dr. Shaw        History Of Present Illness:   Mr. Mono Varghese is a 46-year-old  gentleman, whose medical history is pertinent for a history of tobacco abuse, depression, type 2 diabetes and hypertension. He had suicidal ideation in early Spring of 2013 related to his occupation. He also has a history of obstructive sleep apnea. Leading up to diagnosis, he had seizures, TIA, diagnosis of left parietal oligoastrocytoma with complications of possible ITP that is well compensated while on steroids.     Mr. Varghese today is accompanied by his wife.  He notes that a couple of months ago his 03/01 MRI showed no evidence of recurrent disease.  Of note, he had chemoradiation following resection, and this was nearly 4 years ago.  As of this summer, he is over 3 years and several months out from completion of temozolomide adjuvant chemotherapy.      Overall, his performance status and overall health is fair. His health-related issues and complications at this point in time are almost entirely due to non-cancer-related issues.  His wife states that the biggest complaint is that he has occasional memory issues.  This is not a new issue, and not surprisingly a long-term effect of past surgical resections and radiation treatments.  In the past, we tried multiple attempts to refer him to [facility] for neuropsychological testing, but the patient's wife declined and refused to show up to those appointments.  At this point, his primary care physician is Dr. Louis, and Dr. Tanner from Hepatology is following his liver cirrhosis of unclear etiology.  Other than what I've reviewed above, he is not having any new neurologic complaints or any new issues related to his history of brain tumor.  It is possible some of his memory issues could be long-term side effects of the cranial radiation.  Overall, he is in better spirits than when I met with him last time.  They no-showed for several followup visits over the last year.                  Review Of Systems:   Full 10-point review of systems was performed. Pertinent symptoms are reviewed above per HPI. The wife states the patient had suicidal ideations in March or 04/2013 due to the stresses from his participation in the [employer].     Past medical, social, surgical, and family histories reviewed.   PAST MEDICAL HISTORY:   1. Hypertension.   2. Depression.   3. Diabetes.   4. Former tobacco user.   5. TIA and seizures that led to his diagnosis of left parietal glioma; also concerning likely seizure activity leading to ER evaluation 9/8/2014.  6. Thrombocytopenia of unclear duration. Possible well compensated ITP evaluated by Dr. Rafael Rodriguez.   7. Patient has a history of suicidal ideation, according to his wife. When asked in detail, the patient states that this occurred around March or 04/2013 and was unrelated to physical symptoms, but more to his job in the [employer].   8. He had developed exertional dyspnea at one point with pretty vigorous exercise as well, but not further worked up.   9. He does have a history of reported as moderate obstructive sleep apnea.   10. History of left biopsy of the left nasal naris in 09/2012 showed squamous mucosa with ulceration and granulation tissue. Negative for malignancy.   11. Hypothyroidism - on levothyroxine supplementation.  12. Lower extremity DVT - on enoxaparin  13. Imbalance/falls of unclear etiology  14. Cirrhosis - follows with Dr. Tanner from GI    PAST SURGICAL HISTORY:   1. Hand surgery, not otherwise specified.   2. On 06/06/2013, Stealth-assisted left parietal craniotomy and tumor resection by Dr. J Carlos Aranda, as noted above per HPI.   FAMILY HISTORY: The patient is adopted. His biological family history is unknown, in terms of malignancy.   SOCIAL HISTORY: He lives in [place] with his wife. He has been in the [employer] in [place] for about 8 or 9 years. Tobacco: Cigars for 3 years, quit in  "06/2012. Alcohol: He stopped drinking alcohol several months ago. Drugs: Denies illicit drug use. Denies IV drug use specifically.       Allergies:   Allergies as of 09/15/2013       (No Known Allergies)      Current Medications:   Current Outpatient Prescriptions    Medication  Sig       DULoxetine (CYMBALTA) 60 MG capsule  Take 1 capsule (60 mg) by mouth daily       omeprazole (PRILOSEC) 40 MG capsule  Take 1 capsule (40 mg) by mouth daily       pravastatin (PRAVACHOL) 80 MG tablet  Take 1 tablet (80 mg) by mouth daily       glucose blood VI test strips (TRUE CARE TEST STRIP PACK) strip  by In Vitro route 4 times daily as needed       LANCETS ULTRA FINE MISC  1 Device 4 times daily as needed Use as directed       glipiZIDE (GLUCOTROL XL) 10 MG 24 hr tablet  Take 1 tablet (10 mg) by mouth daily       Multiple Vitamin (MULTI-VITAMIN PO)        dexamethasone (DECADRON) 2 MG tablet  Take 2 tablets 3 times daily until Monday, then take 2 tablets twice daily for 1 week, then 1 tablet in the AM and PM for one week, then one tablet in the AM for 1 week, then stop.       DULoxetine (CYMBALTA) 60 MG capsule  Take by mouth daily. Once daily       omeprazole (PRILOSEC) 40 MG capsule  Take by mouth daily.       levETIRAcetam (KEPPRA) 750 MG tablet  Take 1 tablet by mouth 2 times daily for 30 days.       glipiZIDE (GLUCOTROL XL) 10 MG 24 hr tablet  Take 1 tablet by mouth daily (with breakfast) for 30 days.       metFORMIN (FORTAMET) 1000 MG 24 hr tablet  Take 1,000 mg by mouth daily (with breakfast) for 30 days.       traZODone (DESYREL) 50 MG tablet  Take 1 tablet by mouth nightly as needed for sleep.       buPROPion (WELLBUTRIN SR) 150 MG 12 hr tablet  Take 450 mg by mouth daily.      Physical Exam:   /81 (BP Location: Right arm, Patient Position: Chair, Cuff Size: Adult Regular)  Pulse 64  Temp 97  F (36.1  C) (Oral)  Resp 16  Ht 1.778 m (5' 10\")  Wt 93.5 kg (206 lb 3.2 oz)  SpO2 94%  BMI 29.59 kg/m2    KPS: 70 " - stable  Focused exam:  GENERAL: Very pleasant, middle-aged  gentleman.   HEENT: PERRLA - mucus membranes moist. No evidence of visual field deficits.  NEUROLOGIC: Cranial Nerves II-XII grossly intact. Negative Romberg sign; mild right sided dysmetria, none noted on the left. No dysdiadochokinesia.      Laboratory/Imaging Studies   I personally reviewed the brain MRI that had been performed on 03/01/2017.  I provided a printed copy of that report.  I reviewed it with them in great detail, to their stated satisfaction and understanding.             ASSESSMENT/PLAN:   46-year-old  gentleman with a history of subtotal versus gross total resection on 06/06/2013 by Dr. J Carlos Aranda of a left parietal oligoastrocytoma. Probably less than 10% of the features are oligodendroglioma, but the majority is astrocytic. 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 The tumor was negative for 1p/19q deletions. The patient has had excessive aphasia, seizures and right hemiparesis at diagnosis, and largely resolved following surgery and an aggressive rehabilitation.  S/p six cycles of adjuvant oral/IV temozolomide as of February 2014.    At this point, he completed adjuvant temozolomide more than 3 years ago, specifically 3 years and several months.  Since he missed a few visits, and there was a 1-year interval between MRI scans, and there was no evidence of recurrent disease, then I think it would be acceptable at this point to do yearly scans.  I re-emphasized with them that it is more likely than not that his tumor will recur at either the same grade as it was before, specifically grade 3, or transform into a full glioblastoma grade 4.  However, at this time, predominantly his medical conditions take precedence.  These are non-cancer-related, specifically the cirrhosis under the care of Dr. Tanner with whom he has a follow up in  October, and various other issues under the care of Dr. Louis through the [facility].       Today, I ordered a brain MRI for next 03/2018, which will be a year since his last scan, and he will follow up with me within 1 week to review the results.       I also emphasized with them that they should call sooner than that timeline if he develops any neurologic complaints, issues, seizures, or any other changes in his memory that cannot be explained by hepatic encephalopathy or other non-cancer-related issues.         I spent greater than 30 minutes in consultation, including history and physical, and 25 minutes in discussion. Over 50% of time was spent in counseling and coordination of care.     cc: J Carlos Aranda MD   [facility]   Department of Neurosurgery     Remigio Willoughby MD   [facility]   Department of Neurology         MATT JORDAN MD, PhD

## 2017-05-05 ENCOUNTER — ONCOLOGY VISIT (OUTPATIENT)
Dept: ONCOLOGY | Facility: CLINIC | Age: 47
End: 2017-05-05
Attending: INTERNAL MEDICINE
Payer: MEDICARE

## 2017-05-05 VITALS
HEART RATE: 64 BPM | WEIGHT: 206.2 LBS | RESPIRATION RATE: 16 BRPM | OXYGEN SATURATION: 94 % | BODY MASS INDEX: 29.52 KG/M2 | HEIGHT: 70 IN | DIASTOLIC BLOOD PRESSURE: 81 MMHG | TEMPERATURE: 97 F | SYSTOLIC BLOOD PRESSURE: 129 MMHG

## 2017-05-05 DIAGNOSIS — C71.9 ASTROCYTOMA (H): Primary | ICD-10-CM

## 2017-05-05 PROCEDURE — 99214 OFFICE O/P EST MOD 30 MIN: CPT | Mod: ZP | Performed by: INTERNAL MEDICINE

## 2017-05-05 PROCEDURE — 99212 OFFICE O/P EST SF 10 MIN: CPT

## 2017-05-05 ASSESSMENT — ENCOUNTER SYMPTOMS
DOUBLE VISION: 0
HEARTBURN: 0
EYE IRRITATION: 0
EYE WATERING: 0
CONSTIPATION: 0
BOWEL INCONTINENCE: 1
WEIGHT GAIN: 0
JAUNDICE: 0
CHILLS: 0
VOMITING: 0
BLOOD IN STOOL: 1
NAUSEA: 0
BLOATING: 0
RECTAL PAIN: 0
RECTAL BLEEDING: 0
ALTERED TEMPERATURE REGULATION: 1
POLYDIPSIA: 1
FEVER: 0
INCREASED ENERGY: 1
EYE REDNESS: 0
NIGHT SWEATS: 0
WEIGHT LOSS: 0
DECREASED APPETITE: 1
FATIGUE: 1
POLYPHAGIA: 0
EYE PAIN: 0
ABDOMINAL PAIN: 0
HALLUCINATIONS: 0
DIARRHEA: 1

## 2017-05-05 ASSESSMENT — PAIN SCALES - GENERAL: PAINLEVEL: NO PAIN (0)

## 2017-05-05 NOTE — MR AVS SNAPSHOT
After Visit Summary   5/5/2017    Mono Varghese    MRN: [id number]           Patient Information     Date Of Birth          1970        Visit Information        Provider Department      5/5/2017 10:00 AM Lauro Shaw MD [facility]        Today's Diagnoses     Astrocytoma (H)    -  1       Follow-ups after your visit        Follow-up notes from your care team     Return in about 1 year (around 5/5/2018).      Your next 10 appointments already scheduled     Oct 18, 2017  9:00 AM CDT   Lab with  LAB   [facility])    [address] [place] [id number]   [phone]            Oct 18, 2017  9:50 AM CDT   (Arrive by 9:35 AM)   Return General Liver with Beatriz Tanner MD   [facility] ([facility])    [address] [place] [id number]   [phone]            Apr 27, 2018 11:30 AM CDT   (Arrive by 11:15 AM)   MR BRAIN W/O & W CONTRAST with [address] [facility] ([facility])    [address] [place] [id number]   [phone]           Take your medicines as usual, unless your doctor tells you not to. Bring a list of your current medicines to your exam (including vitamins, minerals and over-the-counter drugs).  You will be given intravenous contrast for this exam. To prepare:   The day before your exam, drink extra fluids at least six 8-ounce glasses (unless your doctor tells you to restrict your fluids).   Have a blood test (creatinine test) within 30 days of your exam. Go to your clinic or Diagnostic Imaging Department for this test.  The MRI machine uses a strong magnet. Please wear clothes without metal (snaps, zippers). A sweatsuit works well, or we may give you a hospital gown.  Please remove any body piercings and hair extensions before you arrive. You will also remove watches,  jewelry, hairpins, wallets, dentures, partial dental plates and hearing aids. You may wear contact lenses, and you may be able to wear your rings. We have a safe place to keep your personal items, but it is safer to leave them at home.   **IMPORTANT** THE INSTRUCTIONS BELOW ARE ONLY FOR THOSE PATIENTS WHO HAVE BEEN TOLD THEY WILL RECEIVE SEDATION OR GENERAL ANESTHESIA DURING THEIR MRI PROCEDURE:  IF YOU WILL RECEIVE SEDATION (take medicine to help you relax during your exam):   You must get the medicine from your doctor before you arrive. Bring the medicine to the exam. Do not take it at home.   Arrive one hour early. Bring someone who can take you home after the test. Your medicine will make you sleepy. After the exam, you may not drive, take a bus or take a taxi by yourself.   No eating 8 hours before your exam. You may have clear liquids up until 4 hours before your exam. (Clear liquids include water, clear tea, black coffee and fruit juice without pulp.)  IF YOU WILL RECEIVE ANESTHESIA (be asleep for your exam):   Arrive 1 1/2 hours early. Bring someone who can take you home after the test. You may not drive, take a bus or take a taxi by yourself.   No eating 8 hours before your exam. You may have clear liquids up until 4 hours before your exam. (Clear liquids include water, clear tea, black coffee and fruit juice without pulp.)  Please call the Imaging Department at your exam site with any questions.            May 04, 2018 11:00 AM CDT   (Arrive by 10:45 AM)   Return Visit with Lauro Shaw MD   [facility] ([facility])    [address] [place] [id number] [phone]              Future tests that were ordered for you today     Open Future Orders        Priority Expected Expires Ordered    MRI Brain w & w/o contrast Routine 3/5/2018 12/14/2019 5/5/2017            Who to contact     If you have questions or need follow up information about  "today's clinic visit or your schedule please contact [facility] directly at [phone].  Normal or non-critical lab and imaging results will be communicated to you by [employer], letter or phone within 4 business days after the clinic has received the results. If you do not hear from us within 7 days, please contact the clinic through [employer] or phone. If you have a critical or abnormal lab result, we will notify you by phone as soon as possible.  Submit refill requests through [employer] or call your pharmacy and they will forward the refill request to us. Please allow 3 business days for your refill to be completed.          Additional Information About Your Visit        [employer] Information     [employer] gives you secure access to your electronic health record. If you see a primary care provider, you can also send messages to your care team and make appointments. If you have questions, please call your primary care clinic.  If you do not have a primary care provider, please call [phone] and they will assist you.        Care EveryWhere ID     This is your Care EveryWhere ID. This could be used by other organizations to access your [facility] medical records  [id number]        Your Vitals Were     Pulse Temperature Respirations Height Pulse Oximetry BMI (Body Mass Index)    64 97  F (36.1  C) (Oral) 16 1.778 m (5' 10\") 94% 29.59 kg/m2       Blood Pressure from Last 3 Encounters:   05/05/17 129/81   04/18/17 133/82   04/05/17 132/86    Weight from Last 3 Encounters:   05/05/17 93.5 kg (206 lb 3.2 oz)   04/18/17 92.5 kg (204 lb)   04/05/17 93.9 kg (207 lb)                 Today's Medication Changes          These changes are accurate as of: 5/5/17 10:45 AM.  If you have any questions, ask your nurse or doctor.               These medicines have changed or have updated prescriptions.        Dose/Directions    Calcium Carb-Cholecalciferol 500-400 MG-UNIT Tabs   Commonly known as:  calcium 500 +D "   This may have changed:  when to take this   Used for:  Malnutrition (H)        Dose:  500 mg   Take 500 mg by mouth daily   Quantity:  90 tablet   Refills:  1       cholecalciferol 1000 UNITS capsule   Commonly known as:  vitamin  -D   This may have changed:  when to take this   Used for:  Malnutrition (H)        Dose:  1 capsule   Take 1 capsule (1,000 Units) by mouth daily   Quantity:  60 capsule   Refills:  0       cyanocobalamin 1000 MCG Tabs   This may have changed:  how to take this   Used for:  Alcoholic cirrhosis of liver with ascites (H)        Dose:  1000 mcg   1,000 mcg by Per G Tube route daily   Quantity:  130 tablet   Refills:  1       lactulose 20 GM/30ML Soln   This may have changed:  when to take this   Used for:  Hepatic encephalopathy (H)        Dose:  30 mL   Take 30 mLs by mouth every morning   Quantity:  946 mL   Refills:  11       sulfamethoxazole-trimethoprim 800-160 MG per tablet   Commonly known as:  BACTRIM DS/SEPTRA DS   This may have changed:  when to take this   Used for:  Spontaneous bacterial peritonitis (H)        Dose:  1 tablet   Take 1 tablet by mouth 2 times daily   Quantity:  60 tablet   Refills:  5       thiamine 100 MG tablet   This may have changed:  when to take this   Used for:  Alcoholic cirrhosis of liver with ascites (H)        Dose:  100 mg   Take 1 tablet (100 mg) by mouth daily   Quantity:  100 tablet   Refills:  1         Stop taking these medicines if you haven't already. Please contact your care team if you have questions.     escitalopram 20 MG tablet   Commonly known as:  LEXAPRO   Stopped by:  Lauro Shaw MD                    Primary Care Provider Office Phone # Fax #    [place] [phone]       [facility] [address] [place] [phone]        Thank you!     Thank you for choosing [facility]  for your care. Our goal is always to provide you with excellent care. Hearing back from our  patients is one way we can continue to improve our services. Please take a few minutes to complete the written survey that you may receive in the mail after your visit with us. Thank you!             Your Updated Medication List - Protect others around you: Learn how to safely use, store and throw away your medicines at www.disposemymeds.org.          This list is accurate as of: 5/5/17 10:45 AM.  Always use your most recent med list.                   Brand Name Dispense Instructions for use    blood glucose monitoring test strip    ONE TOUCH ULTRA    300 each    Use to test blood sugars 4 times daily as needed or as directed.       Calcium Carb-Cholecalciferol 500-400 MG-UNIT Tabs    calcium 500 +D    90 tablet    Take 500 mg by mouth daily       cholecalciferol 1000 UNITS capsule    vitamin  -D    60 capsule    Take 1 capsule (1,000 Units) by mouth daily       cyanocobalamin 1000 MCG Tabs     130 tablet    1,000 mcg by Per G Tube route daily       DILANTIN PO      Take 30 mg by mouth every morning       ferrous sulfate 325 (65 FE) MG tablet    IRON    200 tablet    Take 1 tablet (325 mg) by mouth 2 times daily       gabapentin 100 MG capsule    NEURONTIN    30 capsule    Take 1 capsule (100 mg) by mouth At Bedtime       glipiZIDE 10 MG 24 hr tablet    GLUCOTROL XL    90 tablet    Take 1 tablet (10 mg) by mouth daily       HYDROcodone-acetaminophen 5-325 MG per tablet    NORCO    60 tablet    Take 2 tablets by mouth every 6 hours as needed for moderate to severe pain       hydrOXYzine 25 MG tablet    ATARAX    180 tablet    Take 1 tablet (25 mg) by mouth 2 times daily       hypromellose 0.4 % Soln ophthalmic solution    ARTIFICIAL TEARS    1 Bottle    Place 2 drops into both eyes every 8 hours       lactulose 20 GM/30ML Soln     946 mL    Take 30 mLs by mouth every morning       levothyroxine 88 MCG tablet    SYNTHROID/LEVOTHROID    90 tablet    Take 1 tablet (88 mcg) by mouth daily       melatonin 5 MG tablet       Take 5 mg by mouth nightly as needed for sleep       mirtazapine 15 MG tablet    REMERON    30 tablet    Take 7.5 mg (1/2 tablet) at bedtime for 1 week, THEN increase to 15 mg (1 tablet) at bedtime.       multivitamin, therapeutic with minerals Tabs tablet      Take 1 tablet by mouth 2 times daily       omeprazole 20 MG CR capsule    priLOSEC    120 capsule    Take 2 capsules (40 mg) by mouth 2 times daily       ONE TOUCH LANCETS Misc     100 each    by In Vitro route 4 times daily as needed       pravastatin 80 MG tablet    PRAVACHOL    90 tablet    Take 1 tablet (80 mg) by mouth daily       rifaximin 550 MG Tabs tablet    XIFAXAN    60 tablet    Take 1 tablet (550 mg) by mouth 2 times daily       sulfamethoxazole-trimethoprim 800-160 MG per tablet    BACTRIM DS/SEPTRA DS    60 tablet    Take 1 tablet by mouth 2 times daily       thiamine 100 MG tablet     100 tablet    Take 1 tablet (100 mg) by mouth daily       traZODone 50 MG tablet    DESYREL    135 tablet    Take 1.5 tablets (75 mg) by mouth nightly as needed for sleep

## 2017-05-05 NOTE — LETTER
5/5/2017       RE: Mono Varghese  [address] [facility] [address]     Dear Colleague,    Thank you for referring your patient, Mono Varghese, to the [facility]. Please see a copy of my visit note below.    Oncology Follow-up Visit:   May 5, 2017    Diagnosis: Left parietal oligoastrocytoma, WHO grade 3; predominantly astrocytic, and less than 10% of the specimen was oligodendroglioma. status post subtotal resection by Dr. J Carlos Aranda in early 06/2013. Negative for 1p/19q deletions.  Concurrent chemoradiation July 15 through August 23, 2013.   Initiated adjuvant oral temozolomide 9/17/13; switched to IV temozolomide due to insurance coverage. Completed adjuvant temozolomide chemotherapy Feb 2014.  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  Neurosurgeon: Dr J Carlos Aranda  Radiation Oncologist: Dr Jennyfer Martinez     Oncology History:   The patient had not really sought longitudinal medical care in the past. In autumn of 2012, he established primary care with Dr. Louis in the Primary Care Clinic. He was found to have hypertension and diabetes type 2. He is a former smoker, and he also developed some depression over the following 6 months. On 04/17/2013, he presented to the emergency room at the [facility] with an episode of right-sided weakness that was transient. He had lack of coordination, dysarthria and an odd sensation in the right hand. His wife today describes it as what she calls a seizure. He was sitting at a computer when he had the sudden onset of difficulty controlling moving the right arm. He fell out of the chair at home. The symptoms self resolved after 20 minutes. He was presumed to have a TIA. He was taken to the emergency room by his wife. He was noted to have a transient right-sided weakness and dysarthria, although he had a normal exam on admission. He was admitted as part of a stroke code, presumed  to have a TIA. MRI was performed to rule out stroke. This revealed a left parietal lobe mass. There was expansion in the gyri and T2-FLAIR hyperintensity on that side. He was hospitalized for further evaluation. He was placed on Keppra prophylactically by the neurologist, Dr. Willoughby, at 750 mg b.i.d. The transient cells had been presumed to be seizure activity once the tumor was diagnosed in the left parietal lobe. It was presumed secondary to that mass.   MRI brain was done on 04/17/2013 with and without gadolinium contrast to further elucidate this new finding. There was a large area of the left cerebral hemisphere with dissipated signal abnormality with minimal contrast enhancement. It was concerning for either a low-grade glioma versus an oligodendroglioma. There were some abnormalities consistent with glioma in the right parietal region. He was evaluated by the Neurosurgery Team during that hospital admission and evaluated for possible biopsy or resection. During that hospitalization, he was on the Vascular Neurology Service. The stroke workup was suspended once the infiltrative glioma was detected and aspirin was stopped. The patient had no further deficits on exam. He was discharged on 750 mg b.i.d. dosing of Keppra.   The patient met with Dr. J Carlos Aranda in the Outpatient Neurosurgery Clinic on 05/06/2013 for discussion of left parietal glioma. They discussed observation, biopsy versus surgical excision. The decision was made to move forward with attempt for maximal safe resection versus biopsy. The plan was to obtain a functional MRI. The patient was found to be thrombocytopenic with borderline anemia and leukopenia on 05/09/2013.   The patient met with Dr. Rafael Rodriguez, one of our hematologists here at the [facility]. Dr. Rodriguez wrote an extensive detailed note reviewing the patient's medications. He felt the Seroquel the patient had been on for depression was potentially mildly  myelosuppressive, but it would have been uncommon to have such a alisha specific with platelets while on that dose. Another concern was that the patient had been having borderline low counts for years, and perhaps had not been detected as the patient had not been seeking regular medical care. He felt the glioma would only extremely rarely involve bone marrow and result in cytopenia, and he felt the Keppra would have been an unusual presentation; however, rarely as well, the possibility of myelodysplastic syndrome or concurrent aplastic anemia could not be excluded.   A followup MRI was obtained on 05/16/2013 in anticipation of surgery on 06/06/2013. The patient went forward with Stealth-assisted left parietal craniotomy and tumor resection by Dr. J Carlos Aranda. The patient had been started 2 days preoperatively on dexamethasone 6 mg b.i.d., and in addition prednisone 1 mg/kg per day at the recommendation per Dr. Rodriguez for the pancytopenia representing possibly welcomed compensated ITP.   Dr. Aranda performed an excisional left parietooccipital tumor. Per his operative report, he noted piecemeal resection for what possibly is the result of a gross total resection. On pathology, there were some mixed features of glioma with oligoastrocytoma, and 1p/19q deletions were assessed. There was no evidence of deletions in either 1p nor 19q. The tumor was unusual in that it was predominantly astrocytic, and less than 10% of the specimen was oligodendroglioma. 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 There were only rare mitotic figures present. There was no evidence of tumor necrosis or neurovascular proliferation. Postop MRI shows status post left craniotomy with only some residual signal abnormalities, likely representing residual tumor. It could have been a subtotal resection as well. Postoperatively, the patient developed  steroid-induced hyperglycemia requiring insulin drip.   From 06/11 through 06/21, he was hospitalized at a rehabilitation facility for mild weakness in the right arm and difficulties with speech. He was having expressive aphasia in the postoperative setting, mild dysarthria, right-sided hemiparesis, right-sided neglect. The patient has undergone excellent PT and OT, which continues at home since his discharge. He was discharged to home on 06/21/2013 on prophylactic Keppra. What had been characterized as a right lower extremity involuntary twitching was all prior to surgery, and he has had no seizure activity or loss of consciousness or any tonic-clonic seizure-like activity since surgery and surgical removal of the tumor. Patient met with Dr. Martinez from Radiation Oncology with plans to start postoperative radiation on or around Monday 07/08/2013.   6/27/13--Neuro-Oncology/medical oncology consultation, Dr Lauro Shaw.  7/5/13-Palliative Care consult/counseling - FRANKLYN Ogden. Restarted Cymbalta.  7/15/13--Neuro-surgery clinic follow-up, Dr Aranda.  7/23/13--Oncology follow-up, JOSE Abarca.   7/25/13--Hematology follow-up, Dr Rodriguez, re: ITP.  8/13/13-- Oncology follow-up, JOSE Abarca. worsening expressive aphasia and left hand tremors. Dexamethasone increased to 4 mg po bid.   RADIATION COURSE: ~7/15/13- -8/23/13.  Treatment Summary   Site: Partial Brain  Current Dose: 6000 / 6000 cGy  Fractions: 30/30 8/29/13--Oncology follow-up, JOSE Abarca. patient ran out of temozolomide final week of radiation - did not take this medication. Patient started on Keflex x 7 days for wound dehiscence.  8/30/13-- Neuro-surgery clinic follow-up, Dr Aranda. Evaluation of craniotomy incision. Assessment: Scalp wound dehiscence, possible wound infection.   9/6/13-- Neuro-surgery clinic follow-up, Dr Aranda. Evaluation of craniotomy incision. Assessment: early healing secondarily.  9/6/13--MRI brain--(two  weeks following completion of chemoXRT): Impression: Surgical cavity in the left parietal lobe, with areas of enhancement at the margins of the surgical cavity and areas of abnormal T2 signal in the corpus callosum and left cerebral hemisphere. These findings are unchanged compared to the previous scan. The areas of T2 signal abnormality around the surgical cavity could represent areas of infiltrative tumor, edema, or radiation  induced changes. However, as previously mentioned, since the primary tumor demonstrated only mild contrast enhancement, this enhancement is favored to represent treatment related change. Continued followup is recommended.  9/16/13--Neuro-Oncology/medical oncology follow-up, Dr Shaw. Proceed with cycle 1/day 1 adjuvant 5-day temozolomide (150 mg/m2 daily on days 1-5 with  first cycle; then 200 mg/m2 daily on days 1-5 starting with cycle 2).  9/19/13--Hematology follow-up, Dr Rodriguez. Reason: history of thrombocytopenia.  10/14/13--[facility] follow-up, Dr Aranda.   10/14/13--Oncology follow-up, JOSE Abarca. cycle 2/day 1 adjuvant 5-day temozolomide  10/14/13-MRI brain---stable-- Resection cavity in the left parieto-occipital region with areas of  enhancement along the margin of the surgical cavity not significantly changed since prior study. Interval decrease in T2 hyperintense signal in the left frontoparietal subcortical and periventricular white matter, likely representing decreasing edema. No new areas of abnormal enhancement are identified. Stable small extra-axial fluid collection deep to the craniotomy site.  11/11/13- Neuro-Oncology/medical oncology follow-up, Dr Shaw.  cycle 3/day 1 adjuvant 5-day temozolomide.  12/3/13--Cycle 4/day 1 adjuvant temozolomide---switched to IV (Days 1-5) due to lack of insurance coverage for oral medication.  12/6/13--Nutrition consultation--Judy Valdez RD.   12/9/13--Oncology follow-up, JOSE Abarca.  patient c/o worsened short-term  memory and expressive aphasia/word-finding difficulties. Started Megace 400mg daily for appetite/anorexia.  12/19/13--Brain MRI---- Impression:  1. The left parietooccipital region resection cavity with persistent areas of nodular enhancement along the resection cavity are not significantly changed since 10/14/2013 but are slightly less  pronounced when compared to 8/21/2013; these findings likely reflect post therapy changes.  2. Worsening T2 hyperintense signal abnormality in the splenium and adjacent periatrial white matter bilaterally (left greater than right); uncertain whether this could represent worsening primary brain tumor or if this could be therapy related.  12/26/13--Oncology follow-up, Dr Shaw.  1/10/14--Nutrition consultation--Judy Valdez RD.    1/30/14--Oncology follow-up, JOSE Abarca.    2/6/14--cycle 6/day 1 temozolomide IV.  2/17/14--Palliative care follow-up, Dr Napoles.  Referral to neuropsychiatry.  2/17/14--MRI brain--- 1. Stable left parieto-occipital resection cavity. There is persistent  areas of curvilinear enhancement along the margins of resection  cavity, not significantly changed since most MRI.  2. Stable T2 hyperintensity adjacent to the resection cavity extending  into the splenium of the corpus callosum and the right periatrial  white matter medially, left frontal periventricular white matter  superiorly. These may represent edema/gliosis,however tumor  infiltration cannot be completely ruled out.  2/20/14--oncology follow-up, Dr Shaw. Plan - start active surveillance; discontinue IV temozolomide due to completion of planned 6-month course.  3/20/14--NO-SHOW to scheduled oncology follow-up, JOSE Abarca.  3/25/14--NO-SHOW to scheduled oncology follow-up, JOSE Abarca.  3/27/14-- oncology follow-up, JOSE Abarca. concern for anhedonia, depression, question of possible seizure activity. Referred for Psychiatric assessment.  4/7/14--brain  MRI---Postsurgical changes over the left parietal region, with a stable surgical cavity in the left parietal lobe.  4/14/14-- oncology follow-up, Dr Shaw.  5/7/14 - - neurology consultation, Dr. Devendra Quiroz; Dr Patrick Rogers. 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 Initiate Depakote 500 mg b.i.d.; EEG; consider discontinuation of Wellbutrin.  6/5/14-- primary care follow-up, Dr. King Louis. Recs: trazodone increased for insomnia; wean off Wellbutrin over one month; increased Cymbalta.  6/9/14 - - EEG performed. Results: abnormal EEG due to the presence of asymmetry of the background activity with left hemisphere theta slowing, which is consistent with patient's history of surgery. No epileptiform activities were observed.  6/9/14-- brain MRI: Impression: Stable post surgical changes of left parietal craniotomy   and WHO grade 3 astrocytoma resection. FLAIR hyperintensity  surrounding the resection cavity, extending anteriorly is unchanged.  Curvilinear contrast enhancement along the anterior inferior margin of   the resection cavity is also little changed.   6/19/14 - - oncology follow-up, Dr. Shaw.  Summer 2014: patient was undergoing home occupational therapy, but declined further assistance.  9/8/14-- brain MRI done for surveillance of brain tumor: Impression:   1. Stable postoperative changes compared to MRI study on 6/9/2014.   2. Superior to the surgical cavity there is a gyrus with T2 FLAIR  hyperintensity (series 401 and image 22), may represent residual tumor  however has been decreasing in expansion and conspicuity since the   previous several studies.   3. FLAIR hyperintense in the white matter adjacent to surgical cavity,  stable, may represent gliosis and/or low grade infiltrative tumor.   Continued follow up is recommended.  9/8/14 - - ER evaluation for nausea and vomiting, speech deficit, and extremity weakness. Family reported new right-sided weakness and  speech difficulty. MRI repeated with stroke protocol - - no evidence of stroke. Neurology was consulted: symptoms potentially due to seizure activity, in patient with known history of seizures in brain tumor. Plan was to admit patient to neurology service for EEG.  9/8/14-- brain MRI done in ER to rule out stroke: IMPRESSION   Impression: No evidence of stroke.   9/11/14 - - EEG performed: IMPRESSION: This is an abnormal video EEG due to the presence of mild diffuse nonspecific encephalopathy. There was an additional polymorphic delta activity over the left hemisphere and slight increased amplitude there (breach effect), consistent with the patient's history of craniotomy and resection of astrocytoma. No electrographic seizure or paroxysmal behavioral events were recorded.  9/15/14 - - neuro-oncology/oncology follow-up, Dr. Shaw.   9/11/14 - - EEG performed: IMPRESSION: This is an abnormal video EEG due to the presence of mild diffuse nonspecific encephalopathy. There was an additional polymorphic delta activity over the left hemisphere and slight increased amplitude there (breach effect), consistent with the patient's history of craniotomy and resection of astrocytoma. No electrographic seizure or paroxysmal behavioral events were recorded.   9/15/14 - - neuro-oncology/oncology follow-up, Dr. Shaw.   September 2014 - - patient reportedly developed a full-blown generalized tonic clonic seizure in his right arm and hand, spreading to face and legs became generalized. He was started on Depakote 500 mg b.i.d.  10/6/14-- neurology clinic consultation, Dr. English. Plan: remain on Depakote.  12/15/14 - - telephone note documenting increased short-term memory loss over two weeks.  12/20/14--brain MRI--Impression:   1. Stable postoperative changes of a left parietal resection cavity.   Redemonstration of T2 hyperintensity in the gyrus immediately superior   to the resection cavity without corresponding contrast enhancement.    Findings are stable since MR on 9/8/2014. However, differential still   includes gliosis versus residual tumor bed.   2. There is also unchanged T2 hyperintensity within the white matter   adjacent to the surgical resection site which may have minimally   decreased in conspicuity since prior study. Consider gliosis versus   residual neoplastic process.   12/22/14 - - neuro-oncology/oncology follow-up, Dr. Shaw. Patient was a no-show to this scheduled appointment.  1/5/15 - - neurology clinic follow-up, Dr. English. Assessment: alcohol abuse, recognition to cut down. Resume Depakote 500 mg BID for seizures.  1/5/15 - - oncology follow-up, JOSE Abarca. Patient continues short-term memory loss and needs help medications.  5/1/15 - - oncology follow-up, JOSE Abarca. Early evaluation for left temporal headaches.  5/1/15 - - brain MRI: Impression:   1. Left parietal resection cavity without evidence of tumor recurrence.   2. Persistent T2 white matter changes in both cerebral hemispheres, left greater than right, likely treatment related change.  5/6/15 - - psychiatry follow-up.  5/11/2015 - - neuro-oncology/oncology follow-up, Dr. Shaw. NO-SHOW.  6/13/15 - - ER evaluation for seizure, in form of right arm shaking and slurred speech.  7/13/15 - - neurology consultation, Dr. Ingrid Schulte. Plan: EEG, check valproate level.  9/10/15 - - brain MRI: IMPRESSION  Impression: Left parietal resection cavity without evidence of tumor  recurrence.  9/14/15 - - neuro-oncology/oncology follow-up, Dr. Shaw.  2/29/16--brain MRI - Impression: Unchanged postoperative changes of the parietal craniotomy and left oligoastrocytoma resection. No evidence for recurrent tumor.  3/7/16-- neuro-oncology/oncology follow-up, Dr. Shaw.  2/29/16--brain MRI - Impression: Unchanged postoperative changes of the parietal craniotomy.  3/1/17--brain MRI: Impression:   1. No significant change in T2 hyperintense signal about the left  parietal  resection cavity dating back to 12/20/2014. No evidence of  tumor progression.  2. Stable posttreatment related white matter changes since 2/29/2016.  3/6/17-- neuro-oncology follow up, Dr. Shaw. Patient no-show.  5/5/17 - rescheduled neuro-oncology follow-up, Dr. Shaw        History Of Present Illness:   Mr. Mono Varghese is a 46-year-old  gentleman, whose medical history is pertinent for a history of tobacco abuse, depression, type 2 diabetes and hypertension. He had suicidal ideation in early Spring of 2013 related to his occupation. He also has a history of obstructive sleep apnea. Leading up to diagnosis, he had seizures, TIA, diagnosis of left parietal oligoastrocytoma with complications of possible ITP that is well compensated while on steroids.     Mr. Varghese today is accompanied by his wife.  He notes that a couple of months ago his 03/01 MRI showed no evidence of recurrent disease.  Of note, he had chemoradiation following resection, and this was nearly 4 years ago.  As of this summer, he is over 3 years and several months out from completion of temozolomide adjuvant chemotherapy.      Overall, his performance status and overall health is fair. His health-related issues and complications at this point in time are almost entirely due to non-cancer-related issues.  His wife states that the biggest complaint is that he has occasional memory issues.  This is not a new issue, and not surprisingly a long-term effect of past surgical resections and radiation treatments.  In the past, we tried multiple attempts to refer him to [facility] for neuropsychological testing, but the patient's wife declined and refused to show up to those appointments.  At this point, his primary care physician is Dr. Louis, and Dr. Tanner from Hepatology is following his liver cirrhosis of unclear etiology.  Other than what I've reviewed above, he is not having any new neurologic complaints or any new issues related to his  history of brain tumor.  It is possible some of his memory issues could be long-term side effects of the cranial radiation.  Overall, he is in better spirits than when I met with him last time.  They no-showed for several followup visits over the last year.                 Review Of Systems:   Full 10-point review of systems was performed. Pertinent symptoms are reviewed above per HPI. The wife states the patient had suicidal ideations in March or 04/2013 due to the stresses from his participation in the [employer].     Past medical, social, surgical, and family histories reviewed.   PAST MEDICAL HISTORY:   1. Hypertension.   2. Depression.   3. Diabetes.   4. Former tobacco user.   5. TIA and seizures that led to his diagnosis of left parietal glioma; also concerning likely seizure activity leading to ER evaluation 9/8/2014.  6. Thrombocytopenia of unclear duration. Possible well compensated ITP evaluated by Dr. Rafael Rodriguez.   7. Patient has a history of suicidal ideation, according to his wife. When asked in detail, the patient states that this occurred around March or 04/2013 and was unrelated to physical symptoms, but more to his job in the [employer].   8. He had developed exertional dyspnea at one point with pretty vigorous exercise as well, but not further worked up.   9. He does have a history of reported as moderate obstructive sleep apnea.   10. History of left biopsy of the left nasal naris in 09/2012 showed squamous mucosa with ulceration and granulation tissue. Negative for malignancy.   11. Hypothyroidism - on levothyroxine supplementation.  12. Lower extremity DVT - on enoxaparin  13. Imbalance/falls of unclear etiology  14. Cirrhosis - follows with Dr. Tanner from GI    PAST SURGICAL HISTORY:   1. Hand surgery, not otherwise specified.   2. On 06/06/2013, Stealth-assisted left parietal craniotomy and tumor resection by Dr. J Carlos Aranda, as noted above per HPI.   FAMILY  HISTORY: The patient is adopted. His biological family history is unknown, in terms of malignancy.   SOCIAL HISTORY: He lives in [place] with his wife. He has been in the [employer] in [place] for about 8 or 9 years. Tobacco: Cigars for 3 years, quit in 06/2012. Alcohol: He stopped drinking alcohol several months ago. Drugs: Denies illicit drug use. Denies IV drug use specifically.       Allergies:   Allergies as of 09/15/2013       (No Known Allergies)      Current Medications:   Current Outpatient Prescriptions    Medication  Sig       DULoxetine (CYMBALTA) 60 MG capsule  Take 1 capsule (60 mg) by mouth daily       omeprazole (PRILOSEC) 40 MG capsule  Take 1 capsule (40 mg) by mouth daily       pravastatin (PRAVACHOL) 80 MG tablet  Take 1 tablet (80 mg) by mouth daily       glucose blood VI test strips (TRUE CARE TEST STRIP PACK) strip  by In Vitro route 4 times daily as needed       LANCETS ULTRA FINE MISC  1 Device 4 times daily as needed Use as directed       glipiZIDE (GLUCOTROL XL) 10 MG 24 hr tablet  Take 1 tablet (10 mg) by mouth daily       Multiple Vitamin (MULTI-VITAMIN PO)        dexamethasone (DECADRON) 2 MG tablet  Take 2 tablets 3 times daily until Monday, then take 2 tablets twice daily for 1 week, then 1 tablet in the AM and PM for one week, then one tablet in the AM for 1 week, then stop.       DULoxetine (CYMBALTA) 60 MG capsule  Take by mouth daily. Once daily       omeprazole (PRILOSEC) 40 MG capsule  Take by mouth daily.       levETIRAcetam (KEPPRA) 750 MG tablet  Take 1 tablet by mouth 2 times daily for 30 days.       glipiZIDE (GLUCOTROL XL) 10 MG 24 hr tablet  Take 1 tablet by mouth daily (with breakfast) for 30 days.       metFORMIN (FORTAMET) 1000 MG 24 hr tablet  Take 1,000 mg by mouth daily (with breakfast) for 30 days.       traZODone (DESYREL) 50 MG tablet  Take 1 tablet by mouth nightly as needed for sleep.       buPROPion (WELLBUTRIN SR) 150 MG 12 hr tablet  Take 450 mg by  "mouth daily.      Physical Exam:   /81 (BP Location: Right arm, Patient Position: Chair, Cuff Size: Adult Regular)  Pulse 64  Temp 97  F (36.1  C) (Oral)  Resp 16  Ht 1.778 m (5' 10\")  Wt 93.5 kg (206 lb 3.2 oz)  SpO2 94%  BMI 29.59 kg/m2    KPS: 70 - stable  Focused exam:  GENERAL: Very pleasant, middle-aged  gentleman.   HEENT: PERRLA - mucus membranes moist. No evidence of visual field deficits.  NEUROLOGIC: Cranial Nerves II-XII grossly intact. Negative Romberg sign; mild right sided dysmetria, none noted on the left. No dysdiadochokinesia.      Laboratory/Imaging Studies   I personally reviewed the brain MRI that had been performed on 03/01/2017.  I provided a printed copy of that report.  I reviewed it with them in great detail, to their stated satisfaction and understanding.             ASSESSMENT/PLAN:   46-year-old  gentleman with a history of subtotal versus gross total resection on 06/06/2013 by Dr. J Carlos Aranda of a left parietal oligoastrocytoma. Probably less than 10% of the features are oligodendroglioma, but the majority is astrocytic. 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 The tumor was negative for 1p/19q deletions. The patient has had excessive aphasia, seizures and right hemiparesis at diagnosis, and largely resolved following surgery and an aggressive rehabilitation.  S/p six cycles of adjuvant oral/IV temozolomide as of February 2014.    At this point, he completed adjuvant temozolomide more than 3 years ago, specifically 3 years and several months.  Since he missed a few visits, and there was a 1-year interval between MRI scans, and there was no evidence of recurrent disease, then I think it would be acceptable at this point to do yearly scans.  I re-emphasized with them that it is more likely than not that his tumor will recur at either the same grade as it was before, " specifically grade 3, or transform into a full glioblastoma grade 4.  However, at this time, predominantly his medical conditions take precedence.  These are non-cancer-related, specifically the cirrhosis under the care of Dr. Tanner with whom he has a follow up in October, and various other issues under the care of Dr. Louis through the [facility].       Today, I ordered a brain MRI for next 03/2018, which will be a year since his last scan, and he will follow up with me within 1 week to review the results.       I also emphasized with them that they should call sooner than that timeline if he develops any neurologic complaints, issues, seizures, or any other changes in his memory that cannot be explained by hepatic encephalopathy or other non-cancer-related issues.         I spent greater than 30 minutes in consultation, including history and physical, and 25 minutes in discussion. Over 50% of time was spent in counseling and coordination of care.     cc: J Carlos Aranda MD   [facility]   Department of Neurosurgery     Remigio Willoughby MD   [facility]   Department of Neurology         MATT JORDAN MD, PhD

## 2017-05-05 NOTE — NURSING NOTE
"Oncology Rooming Note    May 5, 2017 10:18 AM   Mono Varghese is a 46 year old male who presents for:    Chief Complaint   Patient presents with     Oncology Clinic Visit     Oligoastrocytoma F/U     Initial Vitals: /81 (BP Location: Right arm, Patient Position: Chair, Cuff Size: Adult Regular)  Pulse 64  Temp 97  F (36.1  C) (Oral)  Resp 16  Ht 1.778 m (5' 10\")  Wt 93.5 kg (206 lb 3.2 oz)  SpO2 94%  BMI 29.59 kg/m2 Estimated body mass index is 29.59 kg/(m^2) as calculated from the following:    Height as of this encounter: 1.778 m (5' 10\").    Weight as of this encounter: 93.5 kg (206 lb 3.2 oz). Body surface area is 2.15 meters squared.  No Pain (0) Comment: Data Unavailable   No LMP for male patient.  Allergies reviewed: Yes  Medications reviewed: Yes    Medications: Medication refills not needed today.  Pharmacy name entered into [facility]:    [facility] [place] [place] [facility] [address] [facility]  [facility] - [place], [place] - [address] [facility]    Clinical concerns:  provider was notified.    7 minutes for nursing intake (face to face time)     Vidya Kaiser CMA              "

## 2017-05-08 DIAGNOSIS — G40.209 PARTIAL EPILEPSY WITH IMPAIRMENT OF CONSCIOUSNESS (H): ICD-10-CM

## 2017-05-10 NOTE — TELEPHONE ENCOUNTER
Drug Name: Dilantin      Dose:30 mg   brand  SIG: Take 1 cap twice daily                                  Days Supply Needed: 4 wk    Pharmacy: [facility] [address] [place] [id number] [facility]    Date of Last Appointment: 2/8/16  Next RTC Date: 6/16  Has a script already been sent recently: no-last script sent approximately 1 year ago     Additonal Notes: Dilantin filled until next follow up appointment

## 2017-05-11 ENCOUNTER — DOCUMENTATION ONLY (OUTPATIENT)
Dept: CARE COORDINATION | Facility: CLINIC | Age: 47
End: 2017-05-11

## 2017-05-11 NOTE — PROGRESS NOTES
[facility] now requests orders and shares plan of care/discharge summaries for some patients through [employer].  Please REPLY TO THIS MESSAGE in order to give authorization for orders when needed.  This is considered a verbal order, you will still receive a faxed copy of orders for signature.  Thank you for your assistance in improving collaboration for our patients.    Patient update:    BG today is 358.  Client has been inconsistent in taking BG. Education provided on the need to do this at least daily.  Client has stopped drinking soda/diet soda and is now drinking flavored/sparkling water. He is also eating 2 small meals per day.  Some lightheadedness has been present. No falls.     Abdominal Girth today is 103cm.  Spouse reports that yesterday was 107cm and client was feeling bloated yesterday. He is feeling better today. On April 27th this value was 99cm. Client is having some increased confusion, but it is better today compared to the past several days. He has stopped taking lactulose and is responding well to Xifaxan. He does have some urgency with this medication, but not explosive diarrhea as he had with the lactulose.     Please Advise.    Angela Manley RN    [phone]  [email or web address]

## 2017-05-18 RX ORDER — EXTENDED PHENYTOIN SODIUM 30 MG/1
CAPSULE ORAL
Qty: 60 CAPSULE | Refills: 0 | Status: SHIPPED | OUTPATIENT
Start: 2017-05-18 | End: 2017-06-08

## 2017-05-26 ENCOUNTER — TELEPHONE (OUTPATIENT)
Dept: INTERNAL MEDICINE | Facility: CLINIC | Age: 47
End: 2017-05-26

## 2017-05-31 ENCOUNTER — MEDICAL CORRESPONDENCE (OUTPATIENT)
Dept: HEALTH INFORMATION MANAGEMENT | Facility: CLINIC | Age: 47
End: 2017-05-31

## 2017-06-08 DIAGNOSIS — G40.209 PARTIAL EPILEPSY WITH IMPAIRMENT OF CONSCIOUSNESS (H): ICD-10-CM

## 2017-06-08 RX ORDER — EXTENDED PHENYTOIN SODIUM 30 MG/1
30 CAPSULE ORAL 2 TIMES DAILY
Qty: 60 CAPSULE | Refills: 0 | Status: SHIPPED | OUTPATIENT
Start: 2017-06-08 | End: 2017-07-20

## 2017-06-09 ENCOUNTER — TELEPHONE (OUTPATIENT)
Dept: PSYCHIATRY | Facility: CLINIC | Age: 47
End: 2017-06-09

## 2017-06-09 ENCOUNTER — MYC REFILL (OUTPATIENT)
Dept: PSYCHIATRY | Facility: CLINIC | Age: 47
End: 2017-06-09

## 2017-06-09 DIAGNOSIS — F33.1 MODERATE EPISODE OF RECURRENT MAJOR DEPRESSIVE DISORDER (H): ICD-10-CM

## 2017-06-09 DIAGNOSIS — G40.209 PARTIAL EPILEPSY WITH IMPAIRMENT OF CONSCIOUSNESS (H): ICD-10-CM

## 2017-06-09 RX ORDER — MIRTAZAPINE 15 MG/1
TABLET, FILM COATED ORAL
Qty: 30 TABLET | Refills: 0 | Status: SHIPPED | OUTPATIENT
Start: 2017-06-09 | End: 2017-06-29

## 2017-06-09 RX ORDER — MIRTAZAPINE 15 MG/1
TABLET, FILM COATED ORAL
Qty: 30 TABLET | Refills: 2 | Status: CANCELLED | OUTPATIENT
Start: 2017-06-09

## 2017-06-09 NOTE — TELEPHONE ENCOUNTER
----- Message from Mayela Whelan RN sent at 6/9/2017  2:06 PM CDT -----  Regarding: FW: Refill Request  Contact: [phone]      ----- Message -----     From: Gladys Toscano     Sent: 6/9/2017   1:58 PM       To: Yisel Peterson RN  Subject: Refill Request                                   Caller:  Spouse, Yisel  Medication:  mirtazapine  Pharmacy and location:  [facility]  Have you contacted the pharmacy: yes  How much of medication do you have left:  One pill left  Pending appt: 6/19/17  Okay to leave VM:  yes    There should be a consent to communicate on file for her.

## 2017-06-09 NOTE — TELEPHONE ENCOUNTER
Received RF request from patient's spouse for:    mirtazapine (REMERON) 15 MG tablet 30 tablet 2 3/6/2017  --   Sig: Take 7.5 mg (1/2 tablet) at bedtime for 1 week, THEN increase to 15 mg (1 tablet) at bedtime.     Last RF:  ~ 5/06/17    Due for RF:  yes    Appointment History:  Last appt: 2/22/17  RTC: 4 weeks  Cancel: none  No-show: none  Next appt: 6/19/17    Will refill per refill protocol.

## 2017-06-14 RX ORDER — EXTENDED PHENYTOIN SODIUM 30 MG/1
CAPSULE ORAL
Qty: 60 CAPSULE | Refills: 0 | OUTPATIENT
Start: 2017-06-14

## 2017-06-14 NOTE — TELEPHONE ENCOUNTER
Message from [facility]:  Mayela Whelan RN Fri Jun 9, 2017 1:56 PM        ----- Message -----   From: Mono Varghese   Sent: 6/9/2017 1:45 PM   To: [facility]  Subject: Medication Renewal Request     Original authorizing provider: MD Mono Hughes would like a refill of the following medications:  mirtazapine (REMERON) 15 MG tablet [Kwesi Pena MD]    Preferred pharmacy: [facility] [address] [place] [id number] [facility]    Comment:      Medication renewals requested in this message routed to other providers:  DILANTIN 30 MG CR capsule [Anil English MD]  dulaglutide (TRULICITY) 0.75 MG/0.5ML pen [King Louis MD]

## 2017-06-15 ENCOUNTER — MYC REFILL (OUTPATIENT)
Dept: FAMILY MEDICINE | Facility: CLINIC | Age: 47
End: 2017-06-15

## 2017-06-15 DIAGNOSIS — K70.31 ALCOHOLIC CIRRHOSIS OF LIVER WITH ASCITES (H): ICD-10-CM

## 2017-06-15 DIAGNOSIS — D49.6 BRAIN TUMOR (H): ICD-10-CM

## 2017-06-15 RX ORDER — LANOLIN ALCOHOL/MO/W.PET/CERES
100 CREAM (GRAM) TOPICAL DAILY
Qty: 100 TABLET | Refills: 1 | Status: SHIPPED | OUTPATIENT
Start: 2017-06-15 | End: 2018-01-11

## 2017-06-15 NOTE — TELEPHONE ENCOUNTER
Reason For Call:   Chief Complaint   Patient presents with     Opioid Refill     norco       Medication Name, Dose and Monthly Quantity:   HYDROcodone-acetaminophen (NORCO) 5-325 MG , 60 tabs    Diagnosis requiring opiates:   Brain tumor (H) [D49.6]     Problem List Updated:   no    Opioid Agreement On File - [facility] PAIN CONTRACT ID# [id number]:  none    Last Urine Drug Screen (at least once every 12 months) Date:   None    Unexpected Results:   na    [place]  Data Reviewed (at least once every 3 months) Date:   6/15/17  Unexpected Results:    no    Last Fill Date:   4/25/17    Due Date:       Last Visit with PCP:   4/5/17    Future Visits with PCP:   none    Processing:   Mail to pharmacy    Mp-Flor Camargo RN

## 2017-06-15 NOTE — TELEPHONE ENCOUNTER
Message from MyChart:  Original authorizing provider: MD Mono Cervantes would like a refill of the following medications:  thiamine 100 MG tablet [King Louis MD]  HYDROcodone-acetaminophen (NORCO) 5-325 MG per tablet [King Louis MD]    Preferred pharmacy: [facility] [address] [place] [id number] [facility]    Comment:

## 2017-06-16 ENCOUNTER — TELEPHONE (OUTPATIENT)
Dept: NEUROLOGY | Facility: CLINIC | Age: 47
End: 2017-06-16

## 2017-06-16 ENCOUNTER — OFFICE VISIT (OUTPATIENT)
Dept: NEUROLOGY | Facility: CLINIC | Age: 47
End: 2017-06-16

## 2017-06-16 VITALS
WEIGHT: 206 LBS | HEART RATE: 64 BPM | DIASTOLIC BLOOD PRESSURE: 72 MMHG | HEIGHT: 70 IN | BODY MASS INDEX: 29.49 KG/M2 | SYSTOLIC BLOOD PRESSURE: 114 MMHG

## 2017-06-16 DIAGNOSIS — G40.909 SEIZURE DISORDER (H): Primary | ICD-10-CM

## 2017-06-16 DIAGNOSIS — G40.209 PARTIAL SYMPTOMATIC EPILEPSY WITH COMPLEX PARTIAL SEIZURES, NOT INTRACTABLE, WITHOUT STATUS EPILEPTICUS (H): ICD-10-CM

## 2017-06-16 DIAGNOSIS — G40.209 PARTIAL SYMPTOMATIC EPILEPSY WITH COMPLEX PARTIAL SEIZURES, NOT INTRACTABLE, WITHOUT STATUS EPILEPTICUS (H): Primary | ICD-10-CM

## 2017-06-16 LAB — PHENYTOIN SERPL-MCNC: 1.5 MG/L (ref 10–20)

## 2017-06-16 PROCEDURE — 80185 ASSAY OF PHENYTOIN TOTAL: CPT | Performed by: PSYCHIATRY & NEUROLOGY

## 2017-06-16 RX ORDER — HYDROCODONE BITARTRATE AND ACETAMINOPHEN 5; 325 MG/1; MG/1
2 TABLET ORAL EVERY 6 HOURS PRN
Qty: 60 TABLET | Refills: 0 | Status: SHIPPED | OUTPATIENT
Start: 2017-06-16 | End: 2017-08-02

## 2017-06-16 RX ORDER — PHENYTOIN SODIUM 100 MG/1
CAPSULE, EXTENDED RELEASE ORAL
Qty: 14 CAPSULE | Refills: 0 | Status: ON HOLD | OUTPATIENT
Start: 2017-06-16 | End: 2017-08-01

## 2017-06-16 ASSESSMENT — ENCOUNTER SYMPTOMS
TINGLING: 0
DECREASED CONCENTRATION: 1
ABDOMINAL PAIN: 0
HYPOTENSION: 0
BLOOD IN STOOL: 1
FEVER: 1
POLYDIPSIA: 1
JAUNDICE: 0
INSOMNIA: 1
RECTAL BLEEDING: 1
RECTAL PAIN: 1
NIGHT SWEATS: 0
EYE PAIN: 0
WEIGHT LOSS: 1
POLYPHAGIA: 0
LEG SWELLING: 0
NAUSEA: 0
SPEECH CHANGE: 1
HYPERTENSION: 0
LIGHT-HEADEDNESS: 1
HEADACHES: 1
DEPRESSION: 1
HALLUCINATIONS: 0
ALTERED TEMPERATURE REGULATION: 1
CLAUDICATION: 0
PANIC: 0
SEIZURES: 1
LEG PAIN: 1
ORTHOPNEA: 0
WEAKNESS: 1
HEARTBURN: 0
WEIGHT GAIN: 0
NERVOUS/ANXIOUS: 1
PARALYSIS: 0
EYE WATERING: 0
DIZZINESS: 1
DISTURBANCES IN COORDINATION: 1
PALPITATIONS: 0
DECREASED APPETITE: 1
SLEEP DISTURBANCES DUE TO BREATHING: 0
CHILLS: 1
BOWEL INCONTINENCE: 0
EYE IRRITATION: 0
INCREASED ENERGY: 1
FATIGUE: 1
TACHYCARDIA: 0
LOSS OF CONSCIOUSNESS: 0
MEMORY LOSS: 1
DIARRHEA: 1
VOMITING: 0
CONSTIPATION: 0
BLOATING: 0
EXERCISE INTOLERANCE: 1
NUMBNESS: 1
DOUBLE VISION: 0
EYE REDNESS: 0
TREMORS: 0

## 2017-06-16 ASSESSMENT — PAIN SCALES - GENERAL: PAINLEVEL: NO PAIN (0)

## 2017-06-16 NOTE — TELEPHONE ENCOUNTER
Dr. English came to me with new medication directions for pt he saw today with very low dilantin level and recent seizure.  Directions include:  1. Load with 15 mg / kg in 2 divided doses.    2. Increase maintenance dose by 30 mg / day ( currently 30 / 30 ).  3. Recheck blood level in one week.    Called wife with these instructions, sent orders to pharmacy, and lab.    Spoke to Dr. English again and obtained instructions that loading dose should be given in three separate doses - called wife back and instructed to give 400 / 400 / 500.

## 2017-06-16 NOTE — MR AVS SNAPSHOT
After Visit Summary   6/16/2017    Mono Varghese    MRN: [id number]           Patient Information     Date Of Birth          1970        Visit Information        Provider Department      6/16/2017 9:00 AM Anil English MD [facility]        Today's Diagnoses     Partial symptomatic epilepsy with complex partial seizures, not intractable, without status epilepticus (H)    -  1       Follow-ups after your visit        Follow-up notes from your care team     Return if symptoms worsen or fail to improve.      Your next 10 appointments already scheduled     Oct 18, 2017  9:00 AM CDT   Lab with  LAB   [facility] ([facility])    [address] [place] [id number]   [phone]            Oct 18, 2017  9:50 AM CDT   (Arrive by 9:35 AM)   Return General Liver with Beatriz Tanner MD   [facility]atology ([facility])    [address] [place] [id number]   [phone]            Apr 27, 2018 11:30 AM CDT   (Arrive by 11:15 AM)   MR BRAIN W/O & W CONTRAST with [address] MRI ([facility])    [address] [place] [id number] [phone]           Take your medicines as usual, unless your doctor tells you not to. Bring a list of your current medicines to your exam (including vitamins, minerals and over-the-counter drugs).  You will be given intravenous contrast for this exam. To prepare:   The day before your exam, drink extra fluids at least six 8-ounce glasses (unless your doctor tells you to restrict your fluids).   Have a blood test (creatinine test) within 30 days of your exam. Go to your clinic or Diagnostic Imaging Department for this test.  The MRI machine uses a strong magnet. Please wear clothes without metal (snaps, zippers). A sweatsuit works well, or we may give you a hospital gown.  Please remove  any body piercings and hair extensions before you arrive. You will also remove watches, jewelry, hairpins, wallets, dentures, partial dental plates and hearing aids. You may wear contact lenses, and you may be able to wear your rings. We have a safe place to keep your personal items, but it is safer to leave them at home.   **IMPORTANT** THE INSTRUCTIONS BELOW ARE ONLY FOR THOSE PATIENTS WHO HAVE BEEN TOLD THEY WILL RECEIVE SEDATION OR GENERAL ANESTHESIA DURING THEIR MRI PROCEDURE:  IF YOU WILL RECEIVE SEDATION (take medicine to help you relax during your exam):   You must get the medicine from your doctor before you arrive. Bring the medicine to the exam. Do not take it at home.   Arrive one hour early. Bring someone who can take you home after the test. Your medicine will make you sleepy. After the exam, you may not drive, take a bus or take a taxi by yourself.   No eating 8 hours before your exam. You may have clear liquids up until 4 hours before your exam. (Clear liquids include water, clear tea, black coffee and fruit juice without pulp.)  IF YOU WILL RECEIVE ANESTHESIA (be asleep for your exam):   Arrive 1 1/2 hours early. Bring someone who can take you home after the test. You may not drive, take a bus or take a taxi by yourself.   No eating 8 hours before your exam. You may have clear liquids up until 4 hours before your exam. (Clear liquids include water, clear tea, black coffee and fruit juice without pulp.)  Please call the Imaging Department at your exam site with any questions.            May 04, 2018 11:00 AM CDT   (Arrive by 10:45 AM)   Return Visit with Lauro Shaw MD   [facility] ([facility])    [address] [address] [phone]              Who to contact     Please call your clinic at [phone] to:    Ask questions about your health    Make or cancel appointments    Discuss your medicines    Learn about your  "test results    Speak to your doctor   If you have compliments or concerns about an experience at your clinic, or if you wish to file a complaint, please contact [facility] Patient Relations at [phone] or email us at [email or web address]         Additional Information About Your Visit        [employer] Information     [employer] gives you secure access to your electronic health record. If you see a primary care provider, you can also send messages to your care team and make appointments. If you have questions, please call your primary care clinic.  If you do not have a primary care provider, please call [phone] and they will assist you.      [employer] is an electronic gateway that provides easy, online access to your medical records. With [employer], you can request a clinic appointment, read your test results, renew a prescription or communicate with your care team.     To access your existing account, please contact your [facility] Clinic or call [phone] for assistance.        Care EveryWhere ID     This is your Care EveryWhere ID. This could be used by other organizations to access your [facility] medical records  [id number]        Your Vitals Were     Pulse Height BMI (Body Mass Index)             64 1.778 m (5' 10\") 29.56 kg/m2          Blood Pressure from Last 3 Encounters:   06/16/17 114/72   05/05/17 129/81   04/18/17 133/82    Weight from Last 3 Encounters:   06/16/17 93.4 kg (206 lb)   05/05/17 93.5 kg (206 lb 3.2 oz)   04/18/17 92.5 kg (204 lb)                 Today's Medication Changes          These changes are accurate as of: 6/16/17 11:59 PM.  If you have any questions, ask your nurse or doctor.               These medicines have changed or have updated prescriptions.        Dose/Directions    Calcium Carb-Cholecalciferol 500-400 MG-UNIT Tabs   Commonly known as:  calcium 500 +D   This may have changed:  when to take this   Used for:  " Malnutrition (H)        Dose:  500 mg   Take 500 mg by mouth daily   Quantity:  90 tablet   Refills:  1       cholecalciferol 1000 UNITS capsule   Commonly known as:  vitamin  -D   This may have changed:  when to take this   Used for:  Malnutrition (H)        Dose:  1 capsule   Take 1 capsule (1,000 Units) by mouth daily   Quantity:  60 capsule   Refills:  0       cyanocobalamin 1000 MCG Tabs   This may have changed:  how to take this   Used for:  Alcoholic cirrhosis of liver with ascites (H)        Dose:  1000 mcg   1,000 mcg by Per G Tube route daily   Quantity:  130 tablet   Refills:  1       * DILANTIN 30 MG CR capsule   This may have changed:  Another medication with the same name was added. Make sure you understand how and when to take each.   Used for:  Partial epilepsy with impairment of consciousness (H)   Generic drug:  phenytoin   Changed by:  Tanesha Azar LPN        Dose:  30 mg   Take 1 capsule (30 mg) by mouth 2 times daily   Quantity:  60 capsule   Refills:  0       * phenytoin 30 MG CR capsule   Commonly known as:  DILANTIN   This may have changed:  You were already taking a medication with the same name, and this prescription was added. Make sure you understand how and when to take each.   Used for:  Partial symptomatic epilepsy with complex partial seizures, not intractable, without status epilepticus (H)   Changed by:  Anil English MD        1 cap twice a day   Quantity:  60 capsule   Refills:  11       * phenytoin 100 MG CR capsule   Commonly known as:  DILANTIN   This may have changed:  You were already taking a medication with the same name, and this prescription was added. Make sure you understand how and when to take each.   Used for:  Seizure disorder (H)   Changed by:  Blair Dolan, RN        Take 7 tabs two times today or 12 hrs apart   Quantity:  14 capsule   Refills:  0       * phenytoin 30 MG CR capsule   Commonly known as:  DILANTIN   This may have changed:  You were already  taking a medication with the same name, and this prescription was added. Make sure you understand how and when to take each.   Used for:  Seizure disorder (H)   Changed by:  Blair Dolan, RN        Take 1 cap each AM and 2 cap each PM   Quantity:  90 capsule   Refills:  11       lactulose 20 GM/30ML Soln   This may have changed:  when to take this   Used for:  Hepatic encephalopathy (H)        Dose:  30 mL   Take 30 mLs by mouth every morning   Quantity:  946 mL   Refills:  11       sulfamethoxazole-trimethoprim 800-160 MG per tablet   Commonly known as:  BACTRIM DS/SEPTRA DS   This may have changed:  when to take this   Used for:  Spontaneous bacterial peritonitis (H)        Dose:  1 tablet   Take 1 tablet by mouth 2 times daily   Quantity:  60 tablet   Refills:  5       * Notice:  This list has 4 medication(s) that are the same as other medications prescribed for you. Read the directions carefully, and ask your doctor or other care provider to review them with you.         Where to get your medicines      These medications were sent to [facility] [address] [place] [address] [facility]  [address] [facility] [address]     Phone:  [phone]     phenytoin 100 MG CR capsule    phenytoin 30 MG CR capsule    phenytoin 30 MG CR capsule                Primary Care Provider Office Phone # Fax #    King Louis [phone]       [facility] [address] [place] [address]        Thank you!     Thank you for choosing [facility]  for your care. Our goal is always to provide you with excellent care. Hearing back from our patients is one way we can continue to improve our services. Please take a few minutes to complete the written survey that you may receive in the mail after your visit with us. Thank you!             Your Updated Medication List - Protect others around you: Learn how to safely use, store and throw away your medicines at  www.disposemymeds.org.          This list is accurate as of: 6/16/17 11:59 PM.  Always use your most recent med list.                   Brand Name Dispense Instructions for use    blood glucose monitoring test strip    ONE TOUCH ULTRA    300 each    Use to test blood sugars 4 times daily as needed or as directed.       Calcium Carb-Cholecalciferol 500-400 MG-UNIT Tabs    calcium 500 +D    90 tablet    Take 500 mg by mouth daily       cholecalciferol 1000 UNITS capsule    vitamin  -D    60 capsule    Take 1 capsule (1,000 Units) by mouth daily       cyanocobalamin 1000 MCG Tabs     130 tablet    1,000 mcg by Per G Tube route daily       * DILANTIN 30 MG CR capsule   Generic drug:  phenytoin     60 capsule    Take 1 capsule (30 mg) by mouth 2 times daily       * phenytoin 30 MG CR capsule    DILANTIN    60 capsule    1 cap twice a day       * phenytoin 100 MG CR capsule    DILANTIN    14 capsule    Take 7 tabs two times today or 12 hrs apart       * phenytoin 30 MG CR capsule    DILANTIN    90 capsule    Take 1 cap each AM and 2 cap each PM       DILANTIN PO      Take 30 mg by mouth every morning       dulaglutide 0.75 MG/0.5ML pen    TRULICITY    2 mL    Inject 0.75 mg Subcutaneous every 7 days       ferrous sulfate 325 (65 FE) MG tablet    IRON    200 tablet    Take 1 tablet (325 mg) by mouth 2 times daily       gabapentin 100 MG capsule    NEURONTIN    30 capsule    Take 1 capsule (100 mg) by mouth At Bedtime       glipiZIDE 10 MG 24 hr tablet    GLUCOTROL XL    90 tablet    Take 1 tablet (10 mg) by mouth daily       HYDROcodone-acetaminophen 5-325 MG per tablet    NORCO    60 tablet    Take 2 tablets by mouth every 6 hours as needed for moderate to severe pain       hydrOXYzine 25 MG tablet    ATARAX    180 tablet    Take 1 tablet (25 mg) by mouth 2 times daily       hypromellose 0.4 % Soln ophthalmic solution    ARTIFICIAL TEARS    1 Bottle    Place 2 drops into both eyes every 8 hours       lactulose 20 GM/30ML  Soln     946 mL    Take 30 mLs by mouth every morning       levothyroxine 88 MCG tablet    SYNTHROID/LEVOTHROID    90 tablet    Take 1 tablet (88 mcg) by mouth daily       melatonin 5 MG tablet      Take 5 mg by mouth nightly as needed for sleep       mirtazapine 15 MG tablet    REMERON    30 tablet    Take 1 tablet (15 mg) at bedtime.       multivitamin, therapeutic with minerals Tabs tablet      Take 1 tablet by mouth 2 times daily       omeprazole 20 MG CR capsule    priLOSEC    120 capsule    Take 2 capsules (40 mg) by mouth 2 times daily       ONE TOUCH LANCETS Misc     100 each    by In Vitro route 4 times daily as needed       pravastatin 80 MG tablet    PRAVACHOL    90 tablet    Take 1 tablet (80 mg) by mouth daily       rifaximin 550 MG Tabs tablet    XIFAXAN    60 tablet    Take 1 tablet (550 mg) by mouth 2 times daily       sulfamethoxazole-trimethoprim 800-160 MG per tablet    BACTRIM DS/SEPTRA DS    60 tablet    Take 1 tablet by mouth 2 times daily       thiamine 100 MG tablet     100 tablet    Take 1 tablet (100 mg) by mouth daily       traZODone 50 MG tablet    DESYREL    135 tablet    Take 1.5 tablets (75 mg) by mouth nightly as needed for sleep       * Notice:  This list has 4 medication(s) that are the same as other medications prescribed for you. Read the directions carefully, and ask your doctor or other care provider to review them with you.

## 2017-06-16 NOTE — LETTER
2017       RE: Mono Varghese  [address] [place] [facility] [address]     Dear Colleague,    Thank you for referring your patient, Mono Varghese, to the [facility]UR[facility] at [facility]. Please see a copy of my visit note below.    2017      King Louis MD   Primary Care Center    [address] [place] [address] [address]   [place] [address]      RE: Mono Varghese   MRN: [id number]   : 1970      Dear Pietro:      Mr. Varghese came in today accompanied by his yovany wife.  He did have 1 recent episode of speech arrest where he apparently also lost consciousness with it.  He has on a small amount of Dilantin 30 mg twice a day and had been reduced from 30 mg 3 times a day.  The last concentration I can find in the chart was a while ago and it was around 10.  We do not know why he had the seizure.  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  A new MRI is pending.  His last EEG was 2015 and there were 2 days of video monitor, showed focal slowing in the left posterior hemisphere related to structural lesion without electrographic seizures.  Sharp waves in the left posterior quadrant were seen.  There was no illness at the day of the seizure, no missing of medications and apparently no new medications added.  He is doing relatively well.     PHYSICAL EXAMINATION:   GENERAL:   Mono is his usual pleasant jocular as before.     VITAL SIGNS:   His blood pressure is 114/72.  Pulse 64.     NEUROLOGIC:   His eye movements are full.  No nystagmus.  His dental care is poor, but no gingivitis is seen.      In summary, he did breakthrough to complex  partial seizure with speech arrest.  His last MRI in March was negative, is pending one in May.  We will check his concentration of phenytoin and see where it is at.  He had been previously on 90 mg per day and was reduced to 60.  He is also on gabapentin, which may have some weak anticonvulsant affect.      We will address this issue as soon as we can.  He does not drive.      Sincerely,       Anil English MD   PS--DIL level is only 1.8. Will give load of 15 mg/kg in 3 divided dose over 2 days and increase maintenance by 30 mg per day.Level then in 1 week.

## 2017-06-17 NOTE — PROGRESS NOTES
2017      King Louis MD   Primary Care Center    [address] [place] [address] [address]   [place] [address]      RE: Mono Varghese   MRN: [id number]   : 1970      Dear Pietro:      Mr. Varghese came in today accompanied by his yovany wife.  He did have 1 recent episode of speech arrest where he apparently also lost consciousness with it.  He has on a small amount of Dilantin 30 mg twice a day and had been reduced from 30 mg 3 times a day.  The last concentration I can find in the chart was a while ago and it was around 10.  We do not know why he had the seizure.  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  A new MRI is pending.  His last EEG was 2015 and there were 2 days of video monitor, showed focal slowing in the left posterior hemisphere related to structural lesion without electrographic seizures.  Sharp waves in the left posterior quadrant were seen.  There was no illness at the day of the seizure, no missing of medications and apparently no new medications added.  He is doing relatively well.        PHYSICAL EXAMINATION:   GENERAL:   Mono is his usual pleasant jocular as before.     VITAL SIGNS:   His blood pressure is 114/72.  Pulse 64.     NEUROLOGIC:   His eye movements are full.  No nystagmus.  His dental care is poor, but no gingivitis is seen.      In summary, he did breakthrough to complex partial seizure with speech arrest.  His last MRI in March was negative, is pending one in May.  We will check his concentration of phenytoin and see where it is at.  He had been previously on 90 mg per day and was reduced to 60.  He is also on gabapentin, which may have some  weak anticonvulsant affect.      We will address this issue as soon as we can.  He does not drive.      Sincerely,       Sara Ceja MD   PS--DIL level is only 1.8. Will give load of 15 mg/kg in 3 divided dose over 2 days and increase maintenance by 30 mg per day.Level then in 1 week.        SARA CEJA MD             D: 2017 09:49   T: 2017 07:15   MT: ANGÉLICA      Name:     SALO MADERA   MRN:      [id number]        Account:      [id number]   :      1970           Service Date: 2017      Document: [id number]

## 2017-06-19 RX ORDER — LANOLIN ALCOHOL/MO/W.PET/CERES
100 CREAM (GRAM) TOPICAL DAILY
Qty: 100 TABLET | Refills: 0
Start: 2017-06-19

## 2017-06-29 DIAGNOSIS — F51.01 PRIMARY INSOMNIA: ICD-10-CM

## 2017-06-29 DIAGNOSIS — F33.1 MODERATE EPISODE OF RECURRENT MAJOR DEPRESSIVE DISORDER (H): ICD-10-CM

## 2017-06-29 RX ORDER — MIRTAZAPINE 15 MG/1
TABLET, FILM COATED ORAL
Qty: 30 TABLET | Refills: 0 | Status: SHIPPED | OUTPATIENT
Start: 2017-06-29 | End: 2017-07-27

## 2017-06-29 RX ORDER — TRAZODONE HYDROCHLORIDE 50 MG/1
75 TABLET, FILM COATED ORAL
Qty: 45 TABLET | Refills: 0 | Status: SHIPPED | OUTPATIENT
Start: 2017-06-29 | End: 2017-07-06

## 2017-06-29 NOTE — TELEPHONE ENCOUNTER
Medication requested: Mirtazapine 15 mg tabs    Last refilled: 6-9-17  Qty: 30      Medication requested: Gabapentin 100 mg caps  Last refilled: 5-26-17  Qty: 30      Last seen: 2-22-17  RTC: 4 wks  Cancel: 0  No-show: 1  Next appt: none    Refill decision: Refill pended and routed to the provider for review/determination due to NOS and no scheduled appointment.    Kathleen M Doege RN

## 2017-07-06 ENCOUNTER — OFFICE VISIT (OUTPATIENT)
Dept: FAMILY MEDICINE | Facility: CLINIC | Age: 47
End: 2017-07-06

## 2017-07-06 VITALS
WEIGHT: 210.1 LBS | HEART RATE: 79 BPM | DIASTOLIC BLOOD PRESSURE: 81 MMHG | SYSTOLIC BLOOD PRESSURE: 122 MMHG | BODY MASS INDEX: 30.15 KG/M2

## 2017-07-06 DIAGNOSIS — E11.9 DM TYPE 2, GOAL HBA1C 7%-8% (H): ICD-10-CM

## 2017-07-06 DIAGNOSIS — R35.0 URINARY FREQUENCY: ICD-10-CM

## 2017-07-06 DIAGNOSIS — G40.909 SEIZURE DISORDER (H): ICD-10-CM

## 2017-07-06 DIAGNOSIS — R05.9 COUGH: ICD-10-CM

## 2017-07-06 DIAGNOSIS — F33.1 MODERATE EPISODE OF RECURRENT MAJOR DEPRESSIVE DISORDER (H): ICD-10-CM

## 2017-07-06 DIAGNOSIS — R10.9 FLANK PAIN: ICD-10-CM

## 2017-07-06 DIAGNOSIS — Z12.5 ENCOUNTER FOR SCREENING FOR MALIGNANT NEOPLASM OF PROSTATE: ICD-10-CM

## 2017-07-06 DIAGNOSIS — F51.01 PRIMARY INSOMNIA: ICD-10-CM

## 2017-07-06 DIAGNOSIS — Z13.89 SCREENING FOR DIABETIC PERIPHERAL NEUROPATHY: Primary | ICD-10-CM

## 2017-07-06 DIAGNOSIS — F33.0 MILD EPISODE OF RECURRENT MAJOR DEPRESSIVE DISORDER (H): ICD-10-CM

## 2017-07-06 LAB
ALBUMIN SERPL-MCNC: 3.3 G/DL (ref 3.4–5)
ALBUMIN UR-MCNC: NEGATIVE MG/DL
ALP SERPL-CCNC: 215 U/L (ref 40–150)
ALT SERPL W P-5'-P-CCNC: 45 U/L (ref 0–70)
ANION GAP SERPL CALCULATED.3IONS-SCNC: 8 MMOL/L (ref 3–14)
APPEARANCE UR: CLEAR
AST SERPL W P-5'-P-CCNC: 46 U/L (ref 0–45)
BASOPHILS # BLD AUTO: 0 10E9/L (ref 0–0.2)
BASOPHILS NFR BLD AUTO: 0.7 %
BILIRUB SERPL-MCNC: 1.2 MG/DL (ref 0.2–1.3)
BILIRUB UR QL STRIP: NEGATIVE
BUN SERPL-MCNC: 12 MG/DL (ref 7–30)
CALCIUM SERPL-MCNC: 8.4 MG/DL (ref 8.5–10.1)
CHLORIDE SERPL-SCNC: 102 MMOL/L (ref 94–109)
CO2 SERPL-SCNC: 24 MMOL/L (ref 20–32)
COLOR UR AUTO: YELLOW
CREAT SERPL-MCNC: 0.85 MG/DL (ref 0.66–1.25)
DIFFERENTIAL METHOD BLD: ABNORMAL
EOSINOPHIL # BLD AUTO: 0.1 10E9/L (ref 0–0.7)
EOSINOPHIL NFR BLD AUTO: 7.9 %
ERYTHROCYTE [DISTWIDTH] IN BLOOD BY AUTOMATED COUNT: 14.2 % (ref 10–15)
GFR SERPL CREATININE-BSD FRML MDRD: ABNORMAL ML/MIN/1.7M2
GLUCOSE SERPL-MCNC: 464 MG/DL (ref 70–99)
GLUCOSE UR STRIP-MCNC: >499 MG/DL
HBA1C MFR BLD: 9.4 % (ref 4.3–6)
HCT VFR BLD AUTO: 37.2 % (ref 40–53)
HGB BLD-MCNC: 12.7 G/DL (ref 13.3–17.7)
HGB UR QL STRIP: NEGATIVE
IMM GRANULOCYTES # BLD: 0 10E9/L (ref 0–0.4)
IMM GRANULOCYTES NFR BLD: 0.7 %
KETONES UR STRIP-MCNC: NEGATIVE MG/DL
LEUKOCYTE ESTERASE UR QL STRIP: NEGATIVE
LIPASE SERPL-CCNC: 364 U/L (ref 73–393)
LYMPHOCYTES # BLD AUTO: 0.3 10E9/L (ref 0.8–5.3)
LYMPHOCYTES NFR BLD AUTO: 22.9 %
MCH RBC QN AUTO: 32.3 PG (ref 26.5–33)
MCHC RBC AUTO-ENTMCNC: 34.1 G/DL (ref 31.5–36.5)
MCV RBC AUTO: 95 FL (ref 78–100)
MONOCYTES # BLD AUTO: 0.1 10E9/L (ref 0–1.3)
MONOCYTES NFR BLD AUTO: 7.9 %
MUCOUS THREADS #/AREA URNS LPF: PRESENT /LPF
NEUTROPHILS # BLD AUTO: 0.8 10E9/L (ref 1.6–8.3)
NEUTROPHILS NFR BLD AUTO: 59.9 %
NITRATE UR QL: NEGATIVE
NRBC # BLD AUTO: 0 10*3/UL
NRBC BLD AUTO-RTO: 0 /100
PH UR STRIP: 5 PH (ref 5–7)
PHENYTOIN SERPL-MCNC: 1.4 MG/L (ref 10–20)
PLATELET # BLD AUTO: 41 10E9/L (ref 150–450)
POTASSIUM SERPL-SCNC: 4.3 MMOL/L (ref 3.4–5.3)
PROT SERPL-MCNC: 6.7 G/DL (ref 6.8–8.8)
PSA SERPL-ACNC: 7.92 UG/L (ref 0–4)
RBC # BLD AUTO: 3.93 10E12/L (ref 4.4–5.9)
RBC #/AREA URNS AUTO: 0 /HPF (ref 0–2)
SODIUM SERPL-SCNC: 133 MMOL/L (ref 133–144)
SP GR UR STRIP: 1.03 (ref 1–1.03)
URN SPEC COLLECT METH UR: ABNORMAL
UROBILINOGEN UR STRIP-MCNC: 0 MG/DL (ref 0–2)
WBC # BLD AUTO: 1.4 10E9/L (ref 4–11)
WBC #/AREA URNS AUTO: <1 /HPF (ref 0–2)

## 2017-07-06 PROCEDURE — 80185 ASSAY OF PHENYTOIN TOTAL: CPT | Performed by: FAMILY MEDICINE

## 2017-07-06 RX ORDER — CIPROFLOXACIN 500 MG/1
500 TABLET, FILM COATED ORAL 2 TIMES DAILY
Qty: 20 TABLET | Refills: 0 | Status: CANCELLED | OUTPATIENT
Start: 2017-07-06

## 2017-07-06 RX ORDER — DULAGLUTIDE 0.75 MG/.5ML
INJECTION, SOLUTION SUBCUTANEOUS
Refills: 0 | Status: CANCELLED | OUTPATIENT
Start: 2017-07-06

## 2017-07-06 RX ORDER — GABAPENTIN 100 MG/1
100 CAPSULE ORAL AT BEDTIME
Qty: 30 CAPSULE | Refills: 3 | Status: SHIPPED | OUTPATIENT
Start: 2017-07-06 | End: 2017-08-17

## 2017-07-06 RX ORDER — TRAZODONE HYDROCHLORIDE 50 MG/1
75 TABLET, FILM COATED ORAL
Qty: 45 TABLET | Refills: 3 | Status: SHIPPED | OUTPATIENT
Start: 2017-07-06 | End: 2017-08-17

## 2017-07-06 ASSESSMENT — PAIN SCALES - GENERAL: PAINLEVEL: MODERATE PAIN (5)

## 2017-07-06 NOTE — MR AVS SNAPSHOT
After Visit Summary   7/6/2017    Mono Varghese    MRN: [id number]           Patient Information     Date Of Birth          1970        Visit Information        Provider Department      7/6/2017 2:05 PM King Louis MD [facility]        Today's Diagnoses     Screening for diabetic peripheral neuropathy    -  1    Flank pain        Cough        Urinary frequency        DM type 2, goal HbA1c 7%-8% (H)        Moderate episode of recurrent major depressive disorder (H)        Mild episode of recurrent major depressive disorder (H)        Primary insomnia        Encounter for screening for malignant neoplasm of prostate           Care Instructions    Primary Care Center Medication Refill Request Information:  * Please contact your pharmacy regarding ANY request for medication refills.  ** [facility] Prescription Fax = [phone]  * Please allow 3 business days for routine medication refills.  * Please allow 5 business days for controlled substance medication refills.     Primary Care Center Test Result notification information:  *You will be notified with in 7-10 days of your appointment day regarding the results of your test.  If you are on MyChart you will be notified as soon as the provider has reviewed the results and signed off on them.    Primary Care Center [phone]             Follow-ups after your visit        Your next 10 appointments already scheduled     Jul 06, 2017  2:45 PM CDT   LAB with  LAB    Health Lab ([facility])    [address] [place] [address] [phone]           Patient must bring picture ID.  Patient should be prepared to give a urine specimen  Please do not eat 10-12 hours before your appointment if you are coming in fasting for labs on lipids, cholesterol, or glucose (sugar).  Pregnant women should follow their Care Team instructions. Water with medications is okay. Do not drink coffee or  other fluids.   If you have concerns about taking  your medications, please ask at office or if scheduling via [employer], send a message by clicking on Secure Messaging, Message Your Care Team.            Jul 06, 2017  4:15 PM CDT   (Arrive by 4:00 PM)   XR CHEST 2 VIEWS with XR[address] [place] [address] Xray ([facility])    [address] [address] [phone]           Please bring a list of your current medicines to your exam. (Include vitamins, minerals and over-thecounter medicines.) Leave your valuables at home.  Tell your doctor if there is a chance you may be pregnant.  You do not need to do anything special for this exam.            Oct 18, 2017  9:00 AM CDT   Lab with  LAB   [facility] Lab ([facility])    [address] [place] [address]   [phone]            Oct 18, 2017  9:50 AM CDT   (Arrive by 9:35 AM)   Return General Liver with Beatriz Tanner MD   [facility] Hepatology ([facility])    [address] [place] [address]   [phone]            Apr 27, 2018 11:30 AM CDT   (Arrive by 11:15 AM)   MR BRAIN W/O & W CONTRAST with [address] MRI ([facility])    [address] [place] [id number] [phone]           Take your medicines as usual, unless your doctor tells you not to. Bring a list of your current medicines to your exam (including vitamins, minerals and over-the-counter drugs).  You will be given intravenous contrast for this exam. To prepare:   The day before your exam, drink extra fluids at least six 8-ounce glasses (unless your doctor tells you to restrict your fluids).   Have a blood test (creatinine test) within 30 days of your exam. Go to your clinic or Diagnostic Imaging Department for this test.  The MRI machine uses a strong magnet.  Please wear clothes without metal (snaps, zippers). A sweatsuit works well, or we may give you a hospital gown.  Please remove any body piercings and hair extensions before you arrive. You will also remove watches, jewelry, hairpins, wallets, dentures, partial dental plates and hearing aids. You may wear contact lenses, and you may be able to wear your rings. We have a safe place to keep your personal items, but it is safer to leave them at home.   **IMPORTANT** THE INSTRUCTIONS BELOW ARE ONLY FOR THOSE PATIENTS WHO HAVE BEEN TOLD THEY WILL RECEIVE SEDATION OR GENERAL ANESTHESIA DURING THEIR MRI PROCEDURE:  IF YOU WILL RECEIVE SEDATION (take medicine to help you relax during your exam):   You must get the medicine from your doctor before you arrive. Bring the medicine to the exam. Do not take it at home.   Arrive one hour early. Bring someone who can take you home after the test. Your medicine will make you sleepy. After the exam, you may not drive, take a bus or take a taxi by yourself.   No eating 8 hours before your exam. You may have clear liquids up until 4 hours before your exam. (Clear liquids include water, clear tea, black coffee and fruit juice without pulp.)  IF YOU WILL RECEIVE ANESTHESIA (be asleep for your exam):   Arrive 1 1/2 hours early. Bring someone who can take you home after the test. You may not drive, take a bus or take a taxi by yourself.   No eating 8 hours before your exam. You may have clear liquids up until 4 hours before your exam. (Clear liquids include water, clear tea, black coffee and fruit juice without pulp.)  Please call the Imaging Department at your exam site with any questions.            May 04, 2018 11:00 AM CDT   (Arrive by 10:45 AM)   Return Visit with Lauro Shaw MD   [facility] ([facility])    [address] [address] [phone]              Future tests that were ordered for you today      Open Future Orders        Priority Expected Expires Ordered    Hemoglobin A1c Routine 7/6/2017 7/20/2017 7/6/2017    CBC with platelets differential Routine 7/6/2017 7/20/2017 7/6/2017    Comprehensive metabolic panel Routine 7/6/2017 7/20/2017 7/6/2017    UA with Micro reflex to Culture Routine 7/6/2017 7/6/2018 7/6/2017    Lipase Routine 7/6/2017 7/20/2017 7/6/2017    PSA screen Routine 7/6/2017 7/6/2018 7/6/2017    XR Chest 2 Views Routine 7/6/2017 7/6/2018 7/6/2017            Who to contact     Please call your clinic at [phone] to:    Ask questions about your health    Make or cancel appointments    Discuss your medicines    Learn about your test results    Speak to your doctor   If you have compliments or concerns about an experience at your clinic, or if you wish to file a complaint, please contact [facility] Patient Relations at [phone] or email us at [email or web address]         Additional Information About Your Visit        [employer] Information     [employer] gives you secure access to your electronic health record. If you see a primary care provider, you can also send messages to your care team and make appointments. If you have questions, please call your primary care clinic.  If you do not have a primary care provider, please call [phone] and they will assist you.      [employer] is an electronic gateway that provides easy, online access to your medical records. With [employer], you can request a clinic appointment, read your test results, renew a prescription or communicate with your care team.     To access your existing account, please contact your [facility] or call [phone] for assistance.        Care EveryWhere ID     This is your Care EveryWhere ID. This could be used by other organizations to access your [place] medical records  [id number]        Your Vitals Were     Pulse BMI (Body Mass Index)                79  30.15 kg/m2           Blood Pressure from Last 3 Encounters:   07/06/17 122/81   06/16/17 114/72   05/05/17 129/81    Weight from Last 3 Encounters:   07/06/17 95.3 kg (210 lb 1.6 oz)   06/16/17 93.4 kg (206 lb)   05/05/17 93.5 kg (206 lb 3.2 oz)                 Today's Medication Changes          These changes are accurate as of: 7/6/17  2:30 PM.  If you have any questions, ask your nurse or doctor.               Start taking these medicines.        Dose/Directions    amoxicillin-clavulanate 875-125 MG per tablet   Commonly known as:  AUGMENTIN   Used for:  Flank pain, Cough, Urinary frequency   Started by:  King Louis MD        Dose:  1 tablet   Take 1 tablet by mouth 2 times daily   Quantity:  20 tablet   Refills:  0         These medicines have changed or have updated prescriptions.        Dose/Directions    Calcium Carb-Cholecalciferol 500-400 MG-UNIT Tabs   Commonly known as:  calcium 500 +D   This may have changed:  when to take this   Used for:  Malnutrition (H)        Dose:  500 mg   Take 500 mg by mouth daily   Quantity:  90 tablet   Refills:  1       cholecalciferol 1000 UNITS capsule   Commonly known as:  vitamin  -D   This may have changed:  when to take this   Used for:  Malnutrition (H)        Dose:  1 capsule   Take 1 capsule (1,000 Units) by mouth daily   Quantity:  60 capsule   Refills:  0       cyanocobalamin 1000 MCG Tabs   This may have changed:  how to take this   Used for:  Alcoholic cirrhosis of liver with ascites (H)        Dose:  1000 mcg   1,000 mcg by Per G Tube route daily   Quantity:  130 tablet   Refills:  1       lactulose 20 GM/30ML Soln   This may have changed:  when to take this   Used for:  Hepatic encephalopathy (H)        Dose:  30 mL   Take 30 mLs by mouth every morning   Quantity:  946 mL   Refills:  11       sulfamethoxazole-trimethoprim 800-160 MG per tablet   Commonly known as:  BACTRIM DS/SEPTRA DS   This may have changed:  when to take this   Used for:   Spontaneous bacterial peritonitis (H)        Dose:  1 tablet   Take 1 tablet by mouth 2 times daily   Quantity:  60 tablet   Refills:  5            Where to get your medicines      These medications were sent to [facility] [address] [place] [address] [facility]  [address] [facility] [address]     Phone:  [phone]     amoxicillin-clavulanate 875-125 MG per tablet    dulaglutide 0.75 MG/0.5ML pen    gabapentin 100 MG capsule    traZODone 50 MG tablet                Primary Care Provider Office Phone # Fax #    King Louis [phone]       PRIMARY [facility]ARE CENTER [address] [place] [address]        Equal Access to Services     SAMUEL FAIR : Jennifer Thomas, waaxda juan, qaybta kaalmada adeegyada, josue tamayo. So [facility] [phone].    ATENCIÓN: Si habla español, tiene a chiang disposición servicios gratuitos de asistencia lingüística. [facility] [phone].    We comply with applicable federal civil rights laws and [place] laws. We do not discriminate on the basis of race, color, national origin, age, disability sex, sexual orientation or gender identity.            Thank you!     Thank you for choosing [facility]  for your care. Our goal is always to provide you with excellent care. Hearing back from our patients is one way we can continue to improve our services. Please take a few minutes to complete the written survey that you may receive in the mail after your visit with us. Thank you!             Your Updated Medication List - Protect others around you: Learn how to safely use, store and throw away your medicines at www.disposemymeds.org.          This list is accurate as of: 7/6/17  2:30 PM.  Always use your most recent med list.                   Brand Name Dispense Instructions for use Diagnosis    amoxicillin-clavulanate 875-125 MG per tablet    AUGMENTIN    20 tablet    Take 1 tablet by  mouth 2 times daily    Flank pain, Cough, Urinary frequency       blood glucose monitoring test strip    ONE TOUCH ULTRA    300 each    Use to test blood sugars 4 times daily as needed or as directed.    Diabetes mellitus, type 2 (H)       Calcium Carb-Cholecalciferol 500-400 MG-UNIT Tabs    calcium 500 +D    90 tablet    Take 500 mg by mouth daily    Malnutrition (H)       cholecalciferol 1000 UNITS capsule    vitamin  -D    60 capsule    Take 1 capsule (1,000 Units) by mouth daily    Malnutrition (H)       cyanocobalamin 1000 MCG Tabs     130 tablet    1,000 mcg by Per G Tube route daily    Alcoholic cirrhosis of liver with ascites (H)       * DILANTIN 30 MG CR capsule   Generic drug:  phenytoin     60 capsule    Take 1 capsule (30 mg) by mouth 2 times daily    Partial epilepsy with impairment of consciousness (H)       * phenytoin 30 MG CR capsule    DILANTIN    60 capsule    1 cap twice a day    Partial symptomatic epilepsy with complex partial seizures, not intractable, without status epilepticus (H)       * phenytoin 100 MG CR capsule    DILANTIN    14 capsule    Take 7 tabs two times today or 12 hrs apart    Seizure disorder (H)       * phenytoin 30 MG CR capsule    DILANTIN    90 capsule    Take 1 cap each AM and 2 cap each PM    Seizure disorder (H)       DILANTIN PO      Take 30 mg by mouth every morning        dulaglutide 0.75 MG/0.5ML pen    TRULICITY    2 mL    Inject 0.75 mg Subcutaneous every 7 days    DM type 2, goal HbA1c 7%-8% (H)       ferrous sulfate 325 (65 FE) MG tablet    IRON    200 tablet    Take 1 tablet (325 mg) by mouth 2 times daily    Other iron deficiency anemia       gabapentin 100 MG capsule    NEURONTIN    30 capsule    Take 1 capsule (100 mg) by mouth At Bedtime    Mild episode of recurrent major depressive disorder (H)       glipiZIDE 10 MG 24 hr tablet    GLUCOTROL XL    90 tablet    Take 1 tablet (10 mg) by mouth daily    DM type 2, goal HbA1c 7%-8% (H)        HYDROcodone-acetaminophen 5-325 MG per tablet    NORCO    60 tablet    Take 2 tablets by mouth every 6 hours as needed for moderate to severe pain    Brain tumor (H)       hydrOXYzine 25 MG tablet    ATARAX    180 tablet    Take 1 tablet (25 mg) by mouth 2 times daily    Itching, Anxiety       hypromellose 0.4 % Soln ophthalmic solution    ARTIFICIAL TEARS    1 Bottle    Place 2 drops into both eyes every 8 hours    Presbyopia       lactulose 20 GM/30ML Soln     946 mL    Take 30 mLs by mouth every morning    Hepatic encephalopathy (H)       levothyroxine 88 MCG tablet    SYNTHROID/LEVOTHROID    90 tablet    Take 1 tablet (88 mcg) by mouth daily    Hypothyroidism       melatonin 5 MG tablet      Take 5 mg by mouth nightly as needed for sleep        mirtazapine 15 MG tablet    REMERON    30 tablet    Take 1 tablet (15 mg) at bedtime.    Moderate episode of recurrent major depressive disorder (H)       multivitamin, therapeutic with minerals Tabs tablet      Take 1 tablet by mouth 2 times daily        omeprazole 20 MG CR capsule    priLOSEC    120 capsule    Take 2 capsules (40 mg) by mouth 2 times daily    Gastroesophageal reflux disease without esophagitis       ONE TOUCH LANCETS Misc     100 each    by In Vitro route 4 times daily as needed    Type II or unspecified type diabetes mellitus without mention of complication, not stated as uncontrolled       pravastatin 80 MG tablet    PRAVACHOL    90 tablet    Take 1 tablet (80 mg) by mouth daily    High cholesterol       rifaximin 550 MG Tabs tablet    XIFAXAN    60 tablet    Take 1 tablet (550 mg) by mouth 2 times daily    Hepatic encephalopathy (H)       sulfamethoxazole-trimethoprim 800-160 MG per tablet    BACTRIM DS/SEPTRA DS    60 tablet    Take 1 tablet by mouth 2 times daily    Spontaneous bacterial peritonitis (H)       thiamine 100 MG tablet     100 tablet    Take 1 tablet (100 mg) by mouth daily    Alcoholic cirrhosis of liver with ascites (H)        traZODone 50 MG tablet    DESYREL    45 tablet    Take 1.5 tablets (75 mg) by mouth nightly as needed for sleep    Primary insomnia       * Notice:  This list has 4 medication(s) that are the same as other medications prescribed for you. Read the directions carefully, and ask your doctor or other care provider to review them with you.

## 2017-07-06 NOTE — PROGRESS NOTES
SUBJECTIVE:    Pt is a 46 year old male with pmh of     Patient Active Problem List   Diagnosis     Type 2 diabetes mellitus (H)     Moderate major depression (H)     LENA (obstructive sleep apnea)-moderate (AHI 16)     Oligoastrocytoma of parietal lobe (H)     ADHD (attention deficit hyperactivity disorder)     Financial difficulties     Thrombocytopenia (H)     Partial epilepsy with impairment of consciousness (H)     Cirrhosis of liver (H)     Pulmonary nodules     Myopic astigmatism of both eyes     Presbyopia     Ringing in ears, unspecified laterality     GIB (gastrointestinal bleeding)     Portal vein thrombosis     Advance care planning     Chronic pain     Hyperlipidemia LDL goal <100     Pancytopenia (H)     Hypothyroidism     Malnutrition (H)     Hx of psychiatric care       who is here for evaluation of had concerns including Diabetes and UTI.    Here for a three day set of sx wife gives history:  C/o cold despite no fever  Sugars were 100-200 usually, now 400-500, no change diet or meds  Slight cough no blood or dyspnea, no ENT sx  Right flank pain, mild, no h/o uti  Urine dark yellow  No other GI or  sx except urine freuquency  Extra thirsty last few days    Interval visits in neuro (seizure), oncology (brain tumor), liver (cirrhosis) reviewed    Allergies   Allergen Reactions     No Clinical Screening - See Comments      Coban and Surgilast cause itching     Tegaderm Transparent Dressing (Informational Only)      Latex Itching and Rash     Ten pt ROS completed, o/w neg    Past Medical History:   Diagnosis Date     Brain tumor (H)      Liver failure (H)      Past Surgical History:   Procedure Laterality Date     COLONOSCOPY N/A 11/27/2015    Procedure: COMBINED COLONOSCOPY, SINGLE OR MULTIPLE BIOPSY/POLYPECTOMY BY BIOPSY;  Surgeon: Ran Thurston MD;  Location:  GI     ESOPHAGOSCOPY, GASTROSCOPY, DUODENOSCOPY (EGD), COMBINED N/A 11/23/2015    Procedure: COMBINED ESOPHAGOSCOPY, GASTROSCOPY,  DUODENOSCOPY (EGD);  Surgeon: Rocael Rizvi MD;  Location: [facility] OR     ESOPHAGOSCOPY, GASTROSCOPY, DUODENOSCOPY (EGD), COMBINED N/A 1/25/2017    Procedure: COMBINED ESOPHAGOSCOPY, GASTROSCOPY, DUODENOSCOPY (EGD), BIOPSY SINGLE OR MULTIPLE;  Surgeon: Rocael Tejada MD;  Location:  GI     ESOPHAGOSCOPY, GASTROSCOPY, DUODENOSCOPY (EGD), COMBINED N/A 3/30/2017    Procedure: COMBINED ESOPHAGOSCOPY, GASTROSCOPY, DUODENOSCOPY (EGD);  Surgeon: Jordana Ramirez MD;  Location:  GI     OPTICAL TRACKING SYSTEM CRANIOTOMY, EXCISE TUMOR, COMBINED  6/6/2013    Procedure: COMBINED OPTICAL TRACKING SYSTEM CRANIOTOMY, EXCISE TUMOR;  Left Stealth Guided Craniotomy , Tumor Resection ;  Surgeon: J Carlos Aranda MD;  Location:  OR     ORTHOPEDIC SURGERY      hand surgery- right     SIGMOIDOSCOPY FLEXIBLE N/A 11/24/2015    Procedure: SIGMOIDOSCOPY FLEXIBLE;  Surgeon: Rocael Rizvi MD;  Location:  GI           Current Outpatient Prescriptions   Medication Sig Dispense Refill     dulaglutide (TRULICITY) 0.75 MG/0.5ML pen Inject 0.75 mg Subcutaneous every 7 days 2 mL 3     gabapentin (NEURONTIN) 100 MG capsule Take 1 capsule (100 mg) by mouth At Bedtime 30 capsule 3     traZODone (DESYREL) 50 MG tablet Take 1.5 tablets (75 mg) by mouth nightly as needed for sleep 45 tablet 3     amoxicillin-clavulanate (AUGMENTIN) 875-125 MG per tablet Take 1 tablet by mouth 2 times daily 20 tablet 0     mirtazapine (REMERON) 15 MG tablet Take 1 tablet (15 mg) at bedtime. 30 tablet 0     HYDROcodone-acetaminophen (NORCO) 5-325 MG per tablet Take 2 tablets by mouth every 6 hours as needed for moderate to severe pain 60 tablet 0     phenytoin (DILANTIN) 30 MG CR capsule 1 cap twice a day 60 capsule 11     phenytoin (DILANTIN) 100 MG CR capsule Take 7 tabs two times today or 12 hrs apart 14 capsule 0     phenytoin (DILANTIN) 30 MG CR capsule Take 1 cap each AM and 2 cap each PM 90 capsule 11     thiamine 100 MG tablet Take 1  tablet (100 mg) by mouth daily 100 tablet 1     DILANTIN 30 MG CR capsule Take 1 capsule (30 mg) by mouth 2 times daily 60 capsule 0     glipiZIDE (GLUCOTROL XL) 10 MG 24 hr tablet Take 1 tablet (10 mg) by mouth daily 90 tablet 1     pravastatin (PRAVACHOL) 80 MG tablet Take 1 tablet (80 mg) by mouth daily 90 tablet 1     hydrOXYzine (ATARAX) 25 MG tablet Take 1 tablet (25 mg) by mouth 2 times daily 180 tablet 1     rifaximin (XIFAXAN) 550 MG TABS tablet Take 1 tablet (550 mg) by mouth 2 times daily 60 tablet 11     blood glucose monitoring (ONE TOUCH ULTRA) test strip Use to test blood sugars 4 times daily as needed or as directed. 300 each 4     levothyroxine (SYNTHROID/LEVOTHROID) 88 MCG tablet Take 1 tablet (88 mcg) by mouth daily 90 tablet 3     cholecalciferol (VITAMIN  -D) 1000 UNITS capsule Take 1 capsule (1,000 Units) by mouth daily (Patient taking differently: Take 1 capsule by mouth every morning ) 60 capsule 0     omeprazole (PRILOSEC) 20 MG CR capsule Take 2 capsules (40 mg) by mouth 2 times daily 120 capsule 11     cyanocobalamin 1000 MCG TABS 1,000 mcg by Per G Tube route daily (Patient taking differently: Take 1,000 mcg by mouth daily ) 130 tablet 1     ferrous sulfate (IRON) 325 (65 FE) MG tablet Take 1 tablet (325 mg) by mouth 2 times daily 200 tablet 3     lactulose 20 GM/30ML SOLN Take 30 mLs by mouth every morning (Patient taking differently: Take 30 mLs by mouth every other day ) 946 mL 11     sulfamethoxazole-trimethoprim (BACTRIM DS,SEPTRA DS) 800-160 MG per tablet Take 1 tablet by mouth 2 times daily (Patient taking differently: Take 1 tablet by mouth every morning ) 60 tablet 5     Phenytoin (DILANTIN PO) Take 30 mg by mouth every morning        Calcium Carb-Cholecalciferol (CALCIUM 500 +D) 500-400 MG-UNIT TABS Take 500 mg by mouth daily (Patient taking differently: Take 500 mg by mouth every morning ) 90 tablet 1     hypromellose (ARTIFICIAL TEARS) 0.4 % SOLN Place 2 drops into both eyes  every 8 hours 1 Bottle 11     multivitamin, therapeutic with minerals (THERA-VIT-M) TABS Take 1 tablet by mouth 2 times daily       melatonin 5 MG tablet Take 5 mg by mouth nightly as needed for sleep       ONE TOUCH LANCETS MISC by In Vitro route 4 times daily as needed 100 each prn     [DISCONTINUED] traZODone (DESYREL) 50 MG tablet Take 1.5 tablets (75 mg) by mouth nightly as needed for sleep 45 tablet 0     [DISCONTINUED] gabapentin (NEURONTIN) 100 MG capsule Take 1 capsule (100 mg) by mouth At Bedtime 30 capsule 3       Social History   Substance Use Topics     Smoking status: Never Smoker     Smokeless tobacco: Never Used     Alcohol use No      Comment: Quit 01/2014           OBJECTIVE:  /81  Pulse 79  Wt 95.3 kg (210 lb 1.6 oz)  BMI 30.15 kg/m2  GENERAL APPEARANCE: Alert, no acute distress  EYES: PERRL, EOM normal, conjunctiva and lids normal  HENT: Ears and TMs normal, oral mucosa and posterior oropharynx normal  NECK: No adenopathy,masses or thyromegaly  RESP: lungs clear to auscultation   CV: normal rate, regular rhythm, no murmur or gallop  ABDOMEN: soft, no organomegaly, masses or tenderness, mild right flank tender  NEURO: Alert, oriented, speech and mentation baseline since brain tumor surgery      ASSESSMENT/PLAN:    Flank pain, urine frequency, slight cough in DM pt w/ sudden doubling sugars, likely infxn source not obvious on exam  Labs as ordered  Empiric Augmentin (cipro, bactrim drug-drug problems)  Call if worse o/w will notify of results as come in    DM: due for lab    KAYODE LANGSTON MD

## 2017-07-06 NOTE — NURSING NOTE
Chief Complaint   Patient presents with     Diabetes     Patient here for high blood sugars.     UTI     Patient here for possible UTI. Patient is having frequent urination, dark urine and flank pain.       Maycol Subramanian CMA at 1:58 PM on 7/6/2017.

## 2017-07-06 NOTE — PATIENT INSTRUCTIONS
[facility] Medication Refill Request Information:  * Please contact your pharmacy regarding ANY request for medication refills.  ** Psychiatric Prescription Fax = [phone]  * Please allow 3 business days for routine medication refills.  * Please allow 5 business days for controlled substance medication refills.     [facility] Test Result notification information:  *You will be notified with in 7-10 days of your appointment day regarding the results of your test.  If you are on MyChart you will be notified as soon as the provider has reviewed the results and signed off on them.    [facility] [phone]

## 2017-07-07 ENCOUNTER — TELEPHONE (OUTPATIENT)
Dept: INTERNAL MEDICINE | Facility: CLINIC | Age: 47
End: 2017-07-07

## 2017-07-07 NOTE — TELEPHONE ENCOUNTER
Left a message to wife to call back.  ---------------------------------          ----- Message from King Louis MD sent at 7/6/2017  4:45 PM CDT -----  Can you call  His wife Yisel, UA normal but PSA up, could be prostate infection so take the Bactrim I gave him, see if he can get appt to f/u with me next week

## 2017-07-09 LAB — PHENYTOIN FREE SERPL-MCNC: ABNORMAL UG/ML

## 2017-07-13 ENCOUNTER — TELEPHONE (OUTPATIENT)
Dept: NEUROLOGY | Facility: CLINIC | Age: 47
End: 2017-07-13

## 2017-07-13 NOTE — TELEPHONE ENCOUNTER
Patient has daily diarrhea since he is on Xifaxan according to wife.  Patient was loaded before and levels did not change.

## 2017-07-13 NOTE — TELEPHONE ENCOUNTER
----- Message from Desiree Henry CMA sent at 7/13/2017 11:19 AM CDT -----  Regarding: aurelio perez  Caller: Yisel    Relationship to Patient: [phone]    Call Back Number: wife    Reason for Call: would like a call about the the dilantin level.

## 2017-07-13 NOTE — TELEPHONE ENCOUNTER
----- Message from Desiree Henry CMA sent at 7/13/2017 11:19 AM CDT -----  Regarding: aurelio perez  Caller: Yisel    Relationship to Patient: [phone]    Call Back Number: wife    Reason for Call: would like a call about the the dilantin level.

## 2017-07-13 NOTE — TELEPHONE ENCOUNTER
Results for SALO MADERA (MRN [id number]) as of 7/13/2017 15:06   Ref. Range 6/16/2017 09:50 7/6/2017 14:42 7/6/2017 14:49 7/6/2017 14:57   Phenytoin Level Latest Ref Range: 10 - 20 mg/L 1.5 (L)   1.4 (L)   Phenytoin Free Unknown    <0.50... (L)

## 2017-07-14 ENCOUNTER — TELEPHONE (OUTPATIENT)
Dept: NEUROLOGY | Facility: CLINIC | Age: 47
End: 2017-07-14

## 2017-07-14 NOTE — TELEPHONE ENCOUNTER
Called wife and  loose stool continues. No seizures reported, last one was mild and before my previous visit. Had pscyh issues with LVTM before and was discontinued.  Will increase maintenance to 150 mg per day-90-60 and see him Tuesday and do level then too.  Fiol

## 2017-07-18 ENCOUNTER — OFFICE VISIT (OUTPATIENT)
Dept: NEUROLOGY | Facility: CLINIC | Age: 47
End: 2017-07-18

## 2017-07-18 VITALS
BODY MASS INDEX: 30.06 KG/M2 | WEIGHT: 210 LBS | HEIGHT: 70 IN | SYSTOLIC BLOOD PRESSURE: 132 MMHG | HEART RATE: 69 BPM | DIASTOLIC BLOOD PRESSURE: 77 MMHG

## 2017-07-18 DIAGNOSIS — G40.109 SEIZURE DISORDER, FOCAL MOTOR (H): Primary | ICD-10-CM

## 2017-07-18 DIAGNOSIS — G40.109 SEIZURE DISORDER, FOCAL MOTOR (H): ICD-10-CM

## 2017-07-18 LAB — PHENYTOIN SERPL-MCNC: 2.6 MG/L (ref 10–20)

## 2017-07-18 PROCEDURE — 80185 ASSAY OF PHENYTOIN TOTAL: CPT | Performed by: PSYCHIATRY & NEUROLOGY

## 2017-07-18 ASSESSMENT — PAIN SCALES - GENERAL: PAINLEVEL: NO PAIN (0)

## 2017-07-18 NOTE — PROGRESS NOTES
Mr. Varghese is a 46-year-old male with the liver failure and also history of a left parietal little astrocytoma resected. Using today in a semiurgent basis as he's had some episodes recently of speech arrest lasting up to 10 minutes. In the past we've managed him on low doses of Dilantin requiring mostly 90 mg per day because of probably  Some enzyme deficiency for Dilantin. Also recently when this episode of speech arrest started suddenly had a couple we did increase him to 150 mg per day of Dilantin as his level was subtherapeutic probably related to use of lactulose for his liver failure and cirrhosis.    I had an come today to a look at possible med review and then discussed with him and his wife who clearly reported he is had no further episodes of speech arrest about times he seems distant and confused and this can last for several minutes. This needs to continue to take the lactulose for his liver failure.    We'll consider possibly using lvtm and I believe he was put on that before and had a drug reaction with mostly psychiatric issues.    On exam today was pleasant and there is no ongoing seizure activity noticed. His eye movements are normal without any nystagmus.    We'll obtain an EEG to rule out subclinical seizure activity will try to maintain that on the Dilantin in spite of his liver problem and consoder other medications. will check his concentration of Dilantin today and see if this is, to a reasonable level and will follow with him and thank you signature   fiol

## 2017-07-18 NOTE — LETTER
7/18/2017       RE: Mono Varghese  [id number] [facility] [address]     Dear Colleague,    Thank you for referring your patient, Mono Varghese, to the [facility] NEUROLOGY at [facility]. Please see a copy of my visit note below.     Mr. Varghese is a 46-year-old male with the liver failure and also history of a left parietal little astrocytoma resected. Using today in a semiurgent basis as he's had some episodes recently of speech arrest lasting up to 10 minutes. In the past we've managed him on low doses of Dilantin requiring mostly 90 mg per day because of probably  Some enzyme deficiency for Dilantin. Also recently when this episode of speech arrest started suddenly had a couple we did increase him to 150 mg per day of Dilantin as his level was subtherapeutic probably related to use of lactulose for his liver failure and cirrhosis.    I had an come today to a look at possible med review and then discussed with him and his wife who clearly reported he is had no further episodes of speech arrest about times he seems distant and confused and this can last for several minutes. This needs to continue to take the lactulose for his liver failure.    We'll consider possibly using lvtm and I believe he was put on that before and had a drug reaction with mostly psychiatric issues.    On exam today was pleasant and there is no ongoing seizure activity noticed. His eye movements are normal without any nystagmus.    We'll obtain an EEG to rule out subclinical seizure activity will try to maintain that on the Dilantin in spite of his liver problem and consoder other medications. will check his concentration of Dilantin today and see if this is, to a reasonable level and will follow with him and thank you signature   fiol    Again, thank you for allowing me to participate in the care of your patient.      Sincerely,    Anil English MD

## 2017-07-18 NOTE — MR AVS SNAPSHOT
After Visit Summary   7/18/2017    Mono Varghese    MRN: [id number]           Patient Information     Date Of Birth          1970        Visit Information        Provider Department      7/18/2017 2:00 PM Anil English MD [facility] Neurology        Today's Diagnoses     Seizure disorder, focal motor (H)    -  1       Follow-ups after your visit        Follow-up notes from your care team     Return in about 3 months (around 10/18/2017).      Your next 10 appointments already scheduled     Jul 18, 2017  3:15 PM CDT   LAB with  LAB   [facility] ([facility])    [address] [place] [address]  [address]st [facility] [address]   [phone]           Patient must bring picture ID.  Patient should be prepared to give a urine specimen  Please do not eat 10-12 hours before your appointment if you are coming in fasting for labs on lipids, cholesterol, or glucose (sugar).  Pregnant women should follow their Care Team instructions. Water with medications is okay. Do not drink coffee or other fluids.   If you have concerns about taking  your medications, please ask at office or if scheduling via [employer], send a message by clicking on Secure Messaging, Message Your Care Team.            Jul 28, 2017 12:00 PM CDT   (Arrive by 11:45 AM)   UMP Routine Visit with  EEG TECH 1   [facility] OUT[facility]IENT ([facility])    [address] [place] [address]  3rd [facility] [address]   [phone]            Aug 01, 2017 10:00 AM CDT   (Arrive by 9:45 AM)   Return Visit with King Louis MD   [facility] ([facility])    [address] [place] [address]  4th [facility] [address]   [phone]            Oct 18, 2017  9:00 AM CDT   Lab with  LAB   [facility] ([facility])    [address] [place] [address]  [address]st [facility] [address]   [phone]            Oct 18, 2017  9:50 AM CDT   (Arrive by 9:35  AM)   Return General Liver with Beatriz Tanner MD   [facility] Hepatology ([facility])    [address] [facility]  3rd [facility] [address]   [phone]            Oct 19, 2017  1:30 PM CDT   (Arrive by 1:15 PM)   Return Seizure with Anil English MD   [facility] Neurology ([facility])    [address] [address]   [phone]            Apr 27, 2018 11:30 AM CDT   (Arrive by 11:15 AM)   MR BRAIN W/O & W CONTRAST with [address] MRI ([facility])    [address] [address] [phone]           Take your medicines as usual, unless your doctor tells you not to. Bring a list of your current medicines to your exam (including vitamins, minerals and over-the-counter drugs).  You will be given intravenous contrast for this exam. To prepare:   The day before your exam, drink extra fluids at least six 8-ounce glasses (unless your doctor tells you to restrict your fluids).   Have a blood test (creatinine test) within 30 days of your exam. Go to your clinic or Diagnostic Imaging Department for this test.  The MRI machine uses a strong magnet. Please wear clothes without metal (snaps, zippers). A sweatsuit works well, or we may give you a hospital gown.  Please remove any body piercings and hair extensions before you arrive. You will also remove watches, jewelry, hairpins, wallets, dentures, partial dental plates and hearing aids. You may wear contact lenses, and you may be able to wear your rings. We have a safe place to keep your personal items, but it is safer to leave them at home.   **IMPORTANT** THE INSTRUCTIONS BELOW ARE ONLY FOR THOSE PATIENTS WHO HAVE BEEN TOLD THEY WILL RECEIVE SEDATION OR GENERAL ANESTHESIA DURING THEIR MRI PROCEDURE:  IF YOU WILL RECEIVE SEDATION (take medicine to help you relax during your exam):   You must get  the medicine from your doctor before you arrive. Bring the medicine to the exam. Do not take it at home.   Arrive one hour early. Bring someone who can take you home after the test. Your medicine will make you sleepy. After the exam, you may not drive, take a bus or take a taxi by yourself.   No eating 8 hours before your exam. You may have clear liquids up until 4 hours before your exam. (Clear liquids include water, clear tea, black coffee and fruit juice without pulp.)  IF YOU WILL RECEIVE ANESTHESIA (be asleep for your exam):   Arrive 1 1/2 hours early. Bring someone who can take you home after the test. You may not drive, take a bus or take a taxi by yourself.   No eating 8 hours before your exam. You may have clear liquids up until 4 hours before your exam. (Clear liquids include water, clear tea, black coffee and fruit juice without pulp.)  Please call the Imaging Department at your exam site with any questions.            May 04, 2018 11:00 AM CDT   (Arrive by 10:45 AM)   Return Visit with Lauro Shaw MD   [facility] ([facility])    [address] [place] [address] [phone]              Future tests that were ordered for you today     Open Future Orders        Priority Expected Expires Ordered    EEG Routine  7/18/2018 7/18/2017    Phenytoin level Routine  7/18/2018 7/18/2017            Who to contact     Please call your clinic at [phone] to:    Ask questions about your health    Make or cancel appointments    Discuss your medicines    Learn about your test results    Speak to your doctor   If you have compliments or concerns about an experience at your clinic, or if you wish to file a complaint, please contact [facility] Patient Relations at [phone] or email us at [email or web address]         Additional Information About Your Visit        MyChart Information     [employer] gives you  "secure access to your electronic health record. If you see a primary care provider, you can also send messages to your care team and make appointments. If you have questions, please call your primary care clinic.  If you do not have a primary care provider, please call [phone] and they will assist you.      [email or web address] is an electronic gateway that provides easy, online access to your medical records. With [email or web address], you can request a clinic appointment, read your test results, renew a prescription or communicate with your care team.     To access your existing account, please contact your [facility] or call [phone] for assistance.        Care EveryWhere ID     This is your Care EveryWhere ID. This could be used by other organizations to access your [place] medical records  [id number]        Your Vitals Were     Pulse Height BMI (Body Mass Index)             69 1.778 m (5' 10\") 30.13 kg/m2          Blood Pressure from Last 3 Encounters:   07/18/17 132/77   07/06/17 122/81   06/16/17 114/72    Weight from Last 3 Encounters:   07/18/17 95.3 kg (210 lb)   07/06/17 95.3 kg (210 lb 1.6 oz)   06/16/17 93.4 kg (206 lb)                 Today's Medication Changes          These changes are accurate as of: 7/18/17  3:04 PM.  If you have any questions, ask your nurse or doctor.               These medicines have changed or have updated prescriptions.        Dose/Directions    Calcium Carb-Cholecalciferol 500-400 MG-UNIT Tabs   Commonly known as:  calcium 500 +D   This may have changed:  when to take this   Used for:  Malnutrition (H)        Dose:  500 mg   Take 500 mg by mouth daily   Quantity:  90 tablet   Refills:  1       cholecalciferol 1000 UNITS capsule   Commonly known as:  vitamin  -D   This may have changed:  when to take this   Used for:  Malnutrition (H)        Dose:  1 capsule   Take 1 capsule (1,000 Units) by mouth daily   Quantity:  60 capsule   Refills:  0       " cyanocobalamin 1000 MCG Tabs   This may have changed:  how to take this   Used for:  Alcoholic cirrhosis of liver with ascites (H)        Dose:  1000 mcg   1,000 mcg by Per G Tube route daily   Quantity:  130 tablet   Refills:  1       lactulose 20 GM/30ML Soln   This may have changed:  when to take this   Used for:  Hepatic encephalopathy (H)        Dose:  30 mL   Take 30 mLs by mouth every morning   Quantity:  946 mL   Refills:  11       sulfamethoxazole-trimethoprim 800-160 MG per tablet   Commonly known as:  BACTRIM DS/SEPTRA DS   This may have changed:  when to take this   Used for:  Spontaneous bacterial peritonitis (H)        Dose:  1 tablet   Take 1 tablet by mouth 2 times daily   Quantity:  60 tablet   Refills:  5                Primary Care Provider Office Phone # Fax #    King Louis [phone]       [facility] [address] [place] [id number]        Equal Access to Services     SAMUEL FAIR : Hadii kevin mendoza hadasho Sojoali, waaxda luqadaha, qaybta kaalmada adeegyada, waxay dinain haycholo roque . So [facility] [phone].    ATENCIÓN: Si habla español, tiene a chiang disposición servicios gratuitos de asistencia lingüística. Manuelito al [phone].    We comply with applicable federal civil rights laws and [place] laws. We do not discriminate on the basis of race, color, national origin, age, disability sex, sexual orientation or gender identity.            Thank you!     Thank you for choosing [facility]  for your care. Our goal is always to provide you with excellent care. Hearing back from our patients is one way we can continue to improve our services. Please take a few minutes to complete the written survey that you may receive in the mail after your visit with us. Thank you!             Your Updated Medication List - Protect others around you: Learn how to safely use, store and throw away your medicines at www.disposemymeds.org.           This list is accurate as of: 7/18/17  3:04 PM.  Always use your most recent med list.                   Brand Name Dispense Instructions for use Diagnosis    amoxicillin-clavulanate 875-125 MG per tablet    AUGMENTIN    20 tablet    Take 1 tablet by mouth 2 times daily    Flank pain, Cough, Urinary frequency       blood glucose monitoring test strip    ONE TOUCH ULTRA    300 each    Use to test blood sugars 4 times daily as needed or as directed.    Diabetes mellitus, type 2 (H)       Calcium Carb-Cholecalciferol 500-400 MG-UNIT Tabs    calcium 500 +D    90 tablet    Take 500 mg by mouth daily    Malnutrition (H)       cholecalciferol 1000 UNITS capsule    vitamin  -D    60 capsule    Take 1 capsule (1,000 Units) by mouth daily    Malnutrition (H)       cyanocobalamin 1000 MCG Tabs     130 tablet    1,000 mcg by Per G Tube route daily    Alcoholic cirrhosis of liver with ascites (H)       * DILANTIN 30 MG CR capsule   Generic drug:  phenytoin     60 capsule    Take 1 capsule (30 mg) by mouth 2 times daily    Partial epilepsy with impairment of consciousness (H)       * phenytoin 30 MG CR capsule    DILANTIN    60 capsule    1 cap twice a day    Partial symptomatic epilepsy with complex partial seizures, not intractable, without status epilepticus (H)       * phenytoin 100 MG CR capsule    DILANTIN    14 capsule    Take 7 tabs two times today or 12 hrs apart    Seizure disorder (H)       * phenytoin 30 MG CR capsule    DILANTIN    90 capsule    Take 1 cap each AM and 2 cap each PM    Seizure disorder (H)       DILANTIN PO      Take 30 mg by mouth every morning        dulaglutide 0.75 MG/0.5ML pen    TRULICITY    2 mL    Inject 0.75 mg Subcutaneous every 7 days    DM type 2, goal HbA1c 7%-8% (H)       ferrous sulfate 325 (65 FE) MG tablet    IRON    200 tablet    Take 1 tablet (325 mg) by mouth 2 times daily    Other iron deficiency anemia       gabapentin 100 MG capsule    NEURONTIN    30 capsule    Take 1 capsule  (100 mg) by mouth At Bedtime    Mild episode of recurrent major depressive disorder (H)       glipiZIDE 10 MG 24 hr tablet    GLUCOTROL XL    90 tablet    Take 1 tablet (10 mg) by mouth daily    DM type 2, goal HbA1c 7%-8% (H)       HYDROcodone-acetaminophen 5-325 MG per tablet    NORCO    60 tablet    Take 2 tablets by mouth every 6 hours as needed for moderate to severe pain    Brain tumor (H)       hydrOXYzine 25 MG tablet    ATARAX    180 tablet    Take 1 tablet (25 mg) by mouth 2 times daily    Itching, Anxiety       hypromellose 0.4 % Soln ophthalmic solution    ARTIFICIAL TEARS    1 Bottle    Place 2 drops into both eyes every 8 hours    Presbyopia       lactulose 20 GM/30ML Soln     946 mL    Take 30 mLs by mouth every morning    Hepatic encephalopathy (H)       levothyroxine 88 MCG tablet    SYNTHROID/LEVOTHROID    90 tablet    Take 1 tablet (88 mcg) by mouth daily    Hypothyroidism       melatonin 5 MG tablet      Take 5 mg by mouth nightly as needed for sleep        mirtazapine 15 MG tablet    REMERON    30 tablet    Take 1 tablet (15 mg) at bedtime.    Moderate episode of recurrent major depressive disorder (H)       multivitamin, therapeutic with minerals Tabs tablet      Take 1 tablet by mouth 2 times daily        omeprazole 20 MG CR capsule    priLOSEC    120 capsule    Take 2 capsules (40 mg) by mouth 2 times daily    Gastroesophageal reflux disease without esophagitis       ONE TOUCH LANCETS Misc     100 each    by In Vitro route 4 times daily as needed    Type II or unspecified type diabetes mellitus without mention of complication, not stated as uncontrolled       pravastatin 80 MG tablet    PRAVACHOL    90 tablet    Take 1 tablet (80 mg) by mouth daily    High cholesterol       rifaximin 550 MG Tabs tablet    XIFAXAN    60 tablet    Take 1 tablet (550 mg) by mouth 2 times daily    Hepatic encephalopathy (H)       sulfamethoxazole-trimethoprim 800-160 MG per tablet    BACTRIM DS/SEPTRA DS    60  tablet    Take 1 tablet by mouth 2 times daily    Spontaneous bacterial peritonitis (H)       thiamine 100 MG tablet     100 tablet    Take 1 tablet (100 mg) by mouth daily    Alcoholic cirrhosis of liver with ascites (H)       traZODone 50 MG tablet    DESYREL    45 tablet    Take 1.5 tablets (75 mg) by mouth nightly as needed for sleep    Primary insomnia       * Notice:  This list has 4 medication(s) that are the same as other medications prescribed for you. Read the directions carefully, and ask your doctor or other care provider to review them with you.

## 2017-07-19 ENCOUNTER — TELEPHONE (OUTPATIENT)
Dept: GASTROENTEROLOGY | Facility: CLINIC | Age: 47
End: 2017-07-19

## 2017-07-19 DIAGNOSIS — K74.60 HEPATIC CIRRHOSIS, UNSPECIFIED HEPATIC CIRRHOSIS TYPE (H): Primary | Chronic | ICD-10-CM

## 2017-07-19 DIAGNOSIS — R18.8 ASCITES: ICD-10-CM

## 2017-07-19 NOTE — TELEPHONE ENCOUNTER
Patient's home care nurse called regarding patient's abdominal girth. Measurements are:  - 108 cm 7/19/17  - 103 cm  0n 7/13/17   - Baseline abdominal girth is  cm     Abdomen is hard to the touch. Writer discussed with Dr. Tanner who wants an US abd performed before a para order can be placed. If ascites then will need a therapeutic and diagnostic paracentesis.    Updated patient's wife and they have an US abd appointment now through [place]. Will await results before setting up with a same day paracentesis. If para is needed but unable to get today, patient's wife plans to bring patient in the the ED.    [place] [phone]

## 2017-07-19 NOTE — TELEPHONE ENCOUNTER
Writer reviewed the ultrasound reports with Dr. Ji. Trace ascites and nonocclusive thrombus found on US abd from 7/19/17. No para needed due to trace ascites.    Since ascites is not the reason for the abdominal distension, an ER visit was ok for further assessment of the patient.     Writer discussed this with patient and his wife. They were in agreement with the plan. Gave copies of today's US abd and last one done in December 2016 for their records.

## 2017-07-20 ENCOUNTER — TELEPHONE (OUTPATIENT)
Dept: NEUROLOGY | Facility: CLINIC | Age: 47
End: 2017-07-20

## 2017-07-20 DIAGNOSIS — G40.209 PARTIAL EPILEPSY WITH IMPAIRMENT OF CONSCIOUSNESS (H): ICD-10-CM

## 2017-07-20 RX ORDER — EXTENDED PHENYTOIN SODIUM 30 MG/1
CAPSULE ORAL
Qty: 210 CAPSULE | Refills: 1 | Status: ON HOLD | OUTPATIENT
Start: 2017-07-20 | End: 2017-08-01

## 2017-07-20 NOTE — TELEPHONE ENCOUNTER
Received message from Dr English asking me to call and increase dilantin by 60 mg / day.  Called patient's wife with instructions and she requested a refill as they ran out as titration has continued.  Ordered sent to pharmacy.

## 2017-07-27 ENCOUNTER — DOCUMENTATION ONLY (OUTPATIENT)
Dept: CARE COORDINATION | Facility: CLINIC | Age: 47
End: 2017-07-27

## 2017-07-27 ENCOUNTER — MYC REFILL (OUTPATIENT)
Dept: FAMILY MEDICINE | Facility: CLINIC | Age: 47
End: 2017-07-27

## 2017-07-27 ENCOUNTER — MYC REFILL (OUTPATIENT)
Dept: GASTROENTEROLOGY | Facility: CLINIC | Age: 47
End: 2017-07-27

## 2017-07-27 DIAGNOSIS — K70.31 ALCOHOLIC CIRRHOSIS OF LIVER WITH ASCITES (H): ICD-10-CM

## 2017-07-27 DIAGNOSIS — E03.9 HYPOTHYROIDISM: ICD-10-CM

## 2017-07-27 DIAGNOSIS — E78.00 HIGH CHOLESTEROL: ICD-10-CM

## 2017-07-27 DIAGNOSIS — F33.0 MILD EPISODE OF RECURRENT MAJOR DEPRESSIVE DISORDER (H): ICD-10-CM

## 2017-07-27 DIAGNOSIS — E11.9 DM TYPE 2, GOAL HBA1C 7%-8% (H): ICD-10-CM

## 2017-07-27 DIAGNOSIS — E46 MALNUTRITION (H): Primary | ICD-10-CM

## 2017-07-27 DIAGNOSIS — K21.9 GASTROESOPHAGEAL REFLUX DISEASE WITHOUT ESOPHAGITIS: ICD-10-CM

## 2017-07-27 RX ORDER — PRAVASTATIN SODIUM 80 MG/1
80 TABLET ORAL DAILY
Qty: 90 TABLET | Refills: 1 | Status: CANCELLED | OUTPATIENT
Start: 2017-07-27

## 2017-07-27 RX ORDER — GLIPIZIDE 10 MG/1
10 TABLET, FILM COATED, EXTENDED RELEASE ORAL DAILY
Qty: 90 TABLET | Refills: 1 | Status: CANCELLED | OUTPATIENT
Start: 2017-07-27

## 2017-07-27 RX ORDER — GABAPENTIN 100 MG/1
100 CAPSULE ORAL AT BEDTIME
Qty: 30 CAPSULE | Refills: 3 | Status: CANCELLED | OUTPATIENT
Start: 2017-07-27

## 2017-07-27 RX ORDER — LEVOTHYROXINE SODIUM 88 UG/1
88 TABLET ORAL DAILY
Qty: 90 TABLET | Refills: 3 | Status: CANCELLED | OUTPATIENT
Start: 2017-07-27

## 2017-07-27 NOTE — TELEPHONE ENCOUNTER
Message from [employer]:  Original authorizing provider: JOHN Yao KARENBonnie Abimael would like a refill of the following medications:  cholecalciferol (VITAMIN -D) 1000 UNITS capsule [Kavita Bach NP]    Preferred pharmacy: [address] [address] [facility]    Comment:      Medication renewals requested in this message routed to other providers:  mirtazapine (REMERON) 15 MG tablet [Kwesi Pena MD]  cyanocobalamin 1000 MCG TABS [King Louis MD]  omeprazole (PRILOSEC) 20 MG CR capsule [King Louis MD]  levothyroxine (SYNTHROID/LEVOTHROID) 88 MCG tablet [King Louis MD]  pravastatin (PRAVACHOL) 80 MG tablet [King Louis MD]  glipiZIDE (GLUCOTROL XL) 10 MG 24 hr tablet [King Louis MD]  dulaglutide (TRULICITY) 0.75 MG/0.5ML pen [King Louis MD]  gabapentin (NEURONTIN) 100 MG capsule [King Louis MD]  rifaximin (XIFAXAN) 550 MG TABS tablet [Beatriz Tanner MD]

## 2017-07-28 ENCOUNTER — TELEPHONE (OUTPATIENT)
Dept: GASTROENTEROLOGY | Facility: CLINIC | Age: 47
End: 2017-07-28

## 2017-07-28 ENCOUNTER — ALLIED HEALTH/NURSE VISIT (OUTPATIENT)
Dept: NEUROLOGY | Facility: CLINIC | Age: 47
End: 2017-07-28

## 2017-07-28 ENCOUNTER — NURSE TRIAGE (OUTPATIENT)
Dept: NURSING | Facility: CLINIC | Age: 47
End: 2017-07-28

## 2017-07-28 ENCOUNTER — HOSPITAL ENCOUNTER (EMERGENCY)
Facility: CLINIC | Age: 47
Discharge: LEFT BEFORE DISCHARGE | End: 2017-07-28
Payer: MEDICARE

## 2017-07-28 VITALS
TEMPERATURE: 96.8 F | RESPIRATION RATE: 20 BRPM | DIASTOLIC BLOOD PRESSURE: 79 MMHG | HEART RATE: 80 BPM | SYSTOLIC BLOOD PRESSURE: 132 MMHG | OXYGEN SATURATION: 95 %

## 2017-07-28 DIAGNOSIS — G40.109 SEIZURE DISORDER, FOCAL MOTOR (H): ICD-10-CM

## 2017-07-28 NOTE — MR AVS SNAPSHOT
After Visit Summary   7/28/2017    Mono Varghese    MRN: [id number]           Patient Information     Date Of Birth          1970        Visit Information        Provider Department      7/28/2017 12:00 PM UC EEG TECH 1 EEG [facility] OUTPATIENT        Today's Diagnoses     Seizure disorder, focal motor (H)           Follow-ups after your visit        Your next 10 appointments already scheduled     Aug 16, 2017 11:00 AM CDT   TELEMEDICINE with Felicia Torres RPH   [facility] Medication Therapy Management ([facility])    [address] [place] [address]   [phone]           Note: this is not an onsite visit; there is no need to come to the facility.            Aug 17, 2017  1:45 PM CDT   Adult Med Follow UP with Devendra Almanzar MD   [facility] ([facility])    [address]  [address] [facility] [address]   [phone]            Sep 06, 2017 12:35 PM CDT   (Arrive by 12:20 PM)   Return Visit with King Louis MD   [facility] Primary Care Clinic ([facility])    [address] [place] [address]   [phone]            Oct 18, 2017  9:00 AM CDT   Lab with  LAB   [facility] Lab ([facility])    [address] [place] [address]   [phone]            Oct 18, 2017  9:50 AM CDT   (Arrive by 9:35 AM)   Return General Liver with Beatriz Tanner MD   [facility] Hepatology ([facility])    [address] [place] [address]   [phone]            Oct 19, 2017  1:30 PM CDT   (Arrive by 1:15 PM)   Return Seizure with Anil English MD   [facility] Neurology ([facility])    [address] [place] [address]   [phone]            Apr 27, 2018 11:30 AM CDT   (Arrive by 11:15 AM)    MR BRAIN W/O & W CONTRAST with UC1   [facility] MRI ([facility])    [address] [facility]  1st Floor  [facility] [address] [phone]           Take your medicines as usual, unless your doctor tells you not to. Bring a list of your current medicines to your exam (including vitamins, minerals and over-the-counter drugs).  You will be given intravenous contrast for this exam. To prepare:   The day before your exam, drink extra fluids at least six 8-ounce glasses (unless your doctor tells you to restrict your fluids).   Have a blood test (creatinine test) within 30 days of your exam. Go to your clinic or Diagnostic Imaging Department for this test.  The MRI machine uses a strong magnet. Please wear clothes without metal (snaps, zippers). A sweatsuit works well, or we may give you a hospital gown.  Please remove any body piercings and hair extensions before you arrive. You will also remove watches, jewelry, hairpins, wallets, dentures, partial dental plates and hearing aids. You may wear contact lenses, and you may be able to wear your rings. We have a safe place to keep your personal items, but it is safer to leave them at home.   **IMPORTANT** THE INSTRUCTIONS BELOW ARE ONLY FOR THOSE PATIENTS WHO HAVE BEEN TOLD THEY WILL RECEIVE SEDATION OR GENERAL ANESTHESIA DURING THEIR MRI PROCEDURE:  IF YOU WILL RECEIVE SEDATION (take medicine to help you relax during your exam):   You must get the medicine from your doctor before you arrive. Bring the medicine to the exam. Do not take it at home.   Arrive one hour early. Bring someone who can take you home after the test. Your medicine will make you sleepy. After the exam, you may not drive, take a bus or take a taxi by yourself.   No eating 8 hours before your exam. You may have clear liquids up until 4 hours before your exam. (Clear liquids include water, clear tea, black coffee and fruit juice without pulp.)  IF YOU WILL  RECEIVE ANESTHESIA (be asleep for your exam):   Arrive 1 1/2 hours early. Bring someone who can take you home after the test. You may not drive, take a bus or take a taxi by yourself.   No eating 8 hours before your exam. You may have clear liquids up until 4 hours before your exam. (Clear liquids include water, clear tea, black coffee and fruit juice without pulp.)  Please call the Imaging Department at your exam site with any questions.            May 04, 2018 11:00 AM CDT   (Arrive by 10:45 AM)   Return Visit with Lauro Shaw MD   [facility])    [address]  2nd Floor  [facility] [address] [phone]              Who to contact     Please call your clinic at [phone] to:    Ask questions about your health    Make or cancel appointments    Discuss your medicines    Learn about your test results    Speak to your doctor   If you have compliments or concerns about an experience at your clinic, or if you wish to file a complaint, please contact [facility] Patient Relations at [phone] or email us at [email or web address]         Additional Information About Your Visit        [email or web address] Information     [facility] gives you secure access to your electronic health record. If you see a primary care provider, you can also send messages to your care team and make appointments. If you have questions, please call your primary care clinic.  If you do not have a primary care provider, please call [phone] and they will assist you.      [facility] is an electronic gateway that provides easy, online access to your medical records. With [facility], you can request a clinic appointment, read your test results, renew a prescription or communicate with your care team.     To access your existing account, please contact your [facility] or call [phone] for assistance.        Care EveryWhere  ID     This is your Care EveryWhere ID. This could be used by other organizations to access your [place] medical records  [id number]         Blood Pressure from Last 3 Encounters:   08/08/17 126/88   08/01/17 125/73   07/28/17 132/79    Weight from Last 3 Encounters:   08/08/17 211 lb (95.7 kg)   08/08/17 210 lb 14.4 oz (95.7 kg)   07/31/17 211 lb (95.7 kg)              We Performed the Following     EEG          Today's Medication Changes          These changes are accurate as of: 7/28/17 11:59 PM.  If you have any questions, ask your nurse or doctor.               These medicines have changed or have updated prescriptions.        Dose/Directions    Calcium Carb-Cholecalciferol 500-400 MG-UNIT Tabs   Commonly known as:  calcium 500 +D   This may have changed:  when to take this   Used for:  Malnutrition (H)        Dose:  500 mg   Take 500 mg by mouth daily   Quantity:  90 tablet   Refills:  1       lactulose 20 GM/30ML Soln   This may have changed:  when to take this   Used for:  Hepatic encephalopathy (H)        Dose:  30 mL   Take 30 mLs by mouth every morning   Quantity:  946 mL   Refills:  11       sulfamethoxazole-trimethoprim 800-160 MG per tablet   Commonly known as:  BACTRIM DS/SEPTRA DS   This may have changed:  when to take this   Used for:  Spontaneous bacterial peritonitis (H)        Dose:  1 tablet   Take 1 tablet by mouth 2 times daily   Quantity:  60 tablet   Refills:  5                Primary Care Provider Office Phone # Fax #    King Louis [phone]       [address] [address]        Equal Access to Services     [facility]: Hadward Thomas, waaxda luqadaha, qaybta kaalmajosue rogers. So [facility] [phone].    ATENCIÓN: Si habla español, tiene a chiang disposición servicios gratuitos de asistencia lingüística. Llame al [phone].    We comply with applicable federal civil rights laws and  [place] laws. We do not discriminate on the basis of race, color, national origin, age, disability sex, sexual orientation or gender identity.            Thank you!     Thank you for choosing [facility] OUT[facility]  for your care. Our goal is always to provide you with excellent care. Hearing back from our patients is one way we can continue to improve our services. Please take a few minutes to complete the written survey that you may receive in the mail after your visit with us. Thank you!             Your Updated Medication List - Protect others around you: Learn how to safely use, store and throw away your medicines at www.disposemymeds.org.          This list is accurate as of: 7/28/17 11:59 PM.  Always use your most recent med list.                   Brand Name Dispense Instructions for use Diagnosis    blood glucose monitoring test strip    ONE TOUCH ULTRA    300 each    Use to test blood sugars 4 times daily as needed or as directed.    Diabetes mellitus, type 2 (H)       Calcium Carb-Cholecalciferol 500-400 MG-UNIT Tabs    calcium 500 +D    90 tablet    Take 500 mg by mouth daily    Malnutrition (H)       cholecalciferol 1000 UNITS capsule    vitamin  -D    90 capsule    Take 1 capsule (1,000 Units) by mouth daily    Malnutrition (H)       cyanocobalamin 1000 MCG Tabs     30 tablet    1,000 mcg by Per G Tube route daily    Alcoholic cirrhosis of liver with ascites (H)       DILANTIN PO      Take 30 mg by mouth every morning        dulaglutide 0.75 MG/0.5ML pen    TRULICITY    2 mL    Inject 0.75 mg Subcutaneous every 7 days    DM type 2, goal HbA1c 7%-8% (H)       ferrous sulfate 325 (65 FE) MG tablet    IRON    200 tablet    Take 1 tablet (325 mg) by mouth 2 times daily    Other iron deficiency anemia       gabapentin 100 MG capsule    NEURONTIN    30 capsule    Take 1 capsule (100 mg) by mouth At Bedtime    Mild episode of recurrent major depressive disorder (H)       glipiZIDE 10 MG 24 hr tablet     GLUCOTROL XL    90 tablet    Take 1 tablet (10 mg) by mouth daily    DM type 2, goal HbA1c 7%-8% (H)       hydrOXYzine 25 MG tablet    ATARAX    180 tablet    Take 1 tablet (25 mg) by mouth 2 times daily    Itching, Anxiety       lactulose 20 GM/30ML Soln     946 mL    Take 30 mLs by mouth every morning    Hepatic encephalopathy (H)       levothyroxine 88 MCG tablet    SYNTHROID/LEVOTHROID    90 tablet    Take 1 tablet (88 mcg) by mouth daily    Hypothyroidism       mirtazapine 15 MG tablet    REMERON    14 tablet    Take 1 tablet (15 mg) at bedtime.**Must attend next appointment for further refills**    Moderate episode of recurrent major depressive disorder (H)       multivitamin, therapeutic with minerals Tabs tablet      Take 1 tablet by mouth 2 times daily        omeprazole 20 MG CR capsule    priLOSEC    120 capsule    Take 2 capsules (40 mg) by mouth 2 times daily    Gastroesophageal reflux disease without esophagitis       ONE TOUCH LANCETS Misc     100 each    by In Vitro route 4 times daily as needed    Type II or unspecified type diabetes mellitus without mention of complication, not stated as uncontrolled       * phenytoin 30 MG CR capsule    DILANTIN    60 capsule    1 cap twice a day    Partial symptomatic epilepsy with complex partial seizures, not intractable, without status epilepticus (H)       * phenytoin 100 MG CR capsule    DILANTIN    14 capsule    Take 7 tabs two times today or 12 hrs apart    Seizure disorder (H)       * phenytoin 30 MG CR capsule    DILANTIN    90 capsule    Take 1 cap each AM and 2 cap each PM    Seizure disorder (H)       * DILANTIN 30 MG CR capsule   Generic drug:  phenytoin     210 capsule    Take 3 caps ( 90 mg ) each AM and 4 caps (120 mg ) each evening    Partial epilepsy with impairment of consciousness (H)       pravastatin 80 MG tablet    PRAVACHOL    90 tablet    Take 1 tablet (80 mg) by mouth daily    High cholesterol       rifaximin 550 MG Tabs tablet    XIFAXAN     60 tablet    Take 1 tablet (550 mg) by mouth 2 times daily    Hepatic encephalopathy (H)       sulfamethoxazole-trimethoprim 800-160 MG per tablet    BACTRIM DS/SEPTRA DS    60 tablet    Take 1 tablet by mouth 2 times daily    Spontaneous bacterial peritonitis (H)       thiamine 100 MG tablet     100 tablet    Take 1 tablet (100 mg) by mouth daily    Alcoholic cirrhosis of liver with ascites (H)       traZODone 50 MG tablet    DESYREL    45 tablet    Take 1.5 tablets (75 mg) by mouth nightly as needed for sleep    Primary insomnia       * Notice:  This list has 4 medication(s) that are the same as other medications prescribed for you. Read the directions carefully, and ask your doctor or other care provider to review them with you.

## 2017-07-28 NOTE — TELEPHONE ENCOUNTER
Marie (nurse, [facility]) called re: pt's portal vein thrombosis. Found in 7/19/17 US, non-occluding, pt declined to go to ED at time. Now developed ascites, gained wt, struggling for BM. Note: Hx ascites. Otherwise asymptomatic, no SOB or chest pain. Triage RN recommended be seen in ED. Marie to contact wife to bring to ED. Marie can be reached at [phone]. Sent to [facility].

## 2017-07-28 NOTE — TELEPHONE ENCOUNTER
Connected with MarieRN ([facility]) to follow up on if patient went to ED as recommended by triage nurse.   Marie reported the she had been in contact with patients wife who stated she was going to bring him to [facility] ED.

## 2017-07-28 NOTE — TELEPHONE ENCOUNTER
Message from Ernestina:  Melida Mehta RN u Jul 27, 2017 12:36 PM        ----- Message -----   From: Mono Varghese   Sent: 7/27/2017 11:26 AM   To: [facility] Nursing Staff-  Subject: Medication Renewal Request     Original authorizing provider: MD Mono Cervantes would like a refill of the following medications:  cyanocobalamin 1000 MCG TABS [King Louis MD]  omeprazole (PRILOSEC) 20 MG CR capsule [King Louis MD]  levothyroxine (SYNTHROID/LEVOTHROID) 88 MCG tablet [King Louis MD]  pravastatin (PRAVACHOL) 80 MG tablet [King Louis MD]  glipiZIDE (GLUCOTROL XL) 10 MG 24 hr tablet [King Louis MD]  dulaglutide (TRULICITY) 0.75 MG/0.5ML pen [King Louis MD]  gabapentin (NEURONTIN) 100 MG capsule [King Louis MD]    Preferred pharmacy: [facility] [address] [place] [id number] [employer]    Comment:      Medication renewals requested in this message routed to other providers:  mirtazapine (REMERON) 15 MG tablet [Kwesi Pena MD]  rifaximin (XIFAXAN) 550 MG TABS tablet [Beatriz Tanner MD]  cholecalciferol (VITAMIN -D) 1000 UNITS capsule [Kaviat Bach NP]

## 2017-07-28 NOTE — ED NOTES
Mono is brought in by his wife for an enlarged and firm abdomen. He is also more confused in the last 48 hrs. He was seen at [facility] today and told to come here for his complex issues. He is alert and knows that it is 2017 and interacts appropriately.

## 2017-07-28 NOTE — TELEPHONE ENCOUNTER
cyanocobalamin      Last Written Prescription Date:  11-3-16  Last Fill Quantity: 130,   # refills: 1  Last Office Visit : 7-6-17  Future Office visit:  8-1-17    Hemoglobin   Date Value Ref Range Status   07/06/2017 12.7 (L) 13.3 - 17.7 g/dL Final   ]  Component Value Flag Ref Range Units Status Collected Lab   Vitamin B12 1421 (H) 193 - 986 pg/mL Final 07/20/2016  1:27 PM 1740     Kathleen M Doege RN

## 2017-07-29 ENCOUNTER — APPOINTMENT (OUTPATIENT)
Dept: CT IMAGING | Facility: CLINIC | Age: 47
DRG: 441 | End: 2017-07-29
Payer: MEDICARE

## 2017-07-29 ENCOUNTER — APPOINTMENT (OUTPATIENT)
Dept: GENERAL RADIOLOGY | Facility: CLINIC | Age: 47
DRG: 441 | End: 2017-07-29
Payer: MEDICARE

## 2017-07-29 ENCOUNTER — APPOINTMENT (OUTPATIENT)
Dept: ULTRASOUND IMAGING | Facility: CLINIC | Age: 47
DRG: 441 | End: 2017-07-29
Attending: INTERNAL MEDICINE
Payer: MEDICARE

## 2017-07-29 ENCOUNTER — HOSPITAL ENCOUNTER (INPATIENT)
Facility: CLINIC | Age: 47
LOS: 3 days | Discharge: HOME HEALTH | DRG: 441 | End: 2017-08-01
Attending: INTERNAL MEDICINE | Admitting: HOSPITALIST
Payer: MEDICARE

## 2017-07-29 DIAGNOSIS — R14.3 FLATULENCE, ERUCTATION AND GAS PAIN: ICD-10-CM

## 2017-07-29 DIAGNOSIS — C71.9 ASTROCYTOMA (H): ICD-10-CM

## 2017-07-29 DIAGNOSIS — K72.10 END-STAGE LIVER DISEASE (H): ICD-10-CM

## 2017-07-29 DIAGNOSIS — R14.1 FLATULENCE, ERUCTATION AND GAS PAIN: ICD-10-CM

## 2017-07-29 DIAGNOSIS — R56.9 SEIZURES (H): ICD-10-CM

## 2017-07-29 DIAGNOSIS — R14.2 FLATULENCE, ERUCTATION AND GAS PAIN: ICD-10-CM

## 2017-07-29 DIAGNOSIS — K76.82 HEPATIC ENCEPHALOPATHY (H): ICD-10-CM

## 2017-07-29 DIAGNOSIS — F90.9 ATTENTION DEFICIT HYPERACTIVITY DISORDER (ADHD), UNSPECIFIED ADHD TYPE: ICD-10-CM

## 2017-07-29 DIAGNOSIS — Z92.89 HX OF PSYCHIATRIC CARE: Primary | ICD-10-CM

## 2017-07-29 DIAGNOSIS — I81 PORTAL VEIN THROMBOSIS: ICD-10-CM

## 2017-07-29 DIAGNOSIS — C71.3 OLIGOASTROCYTOMA OF PARIETAL LOBE (H): Chronic | ICD-10-CM

## 2017-07-29 DIAGNOSIS — F32.1 MODERATE MAJOR DEPRESSION (H): Chronic | ICD-10-CM

## 2017-07-29 LAB
ALBUMIN SERPL-MCNC: 3.1 G/DL (ref 3.4–5)
ALBUMIN UR-MCNC: NEGATIVE MG/DL
ALP SERPL-CCNC: 200 U/L (ref 40–150)
ALT SERPL W P-5'-P-CCNC: 40 U/L (ref 0–70)
AMMONIA PLAS-SCNC: 60 UMOL/L (ref 10–50)
ANION GAP SERPL CALCULATED.3IONS-SCNC: 5 MMOL/L (ref 3–14)
APPEARANCE UR: CLEAR
AST SERPL W P-5'-P-CCNC: 50 U/L (ref 0–45)
BASOPHILS # BLD AUTO: 0 10E9/L (ref 0–0.2)
BASOPHILS NFR BLD AUTO: 0.6 %
BILIRUB SERPL-MCNC: 1.3 MG/DL (ref 0.2–1.3)
BILIRUB UR QL STRIP: NEGATIVE
BUN SERPL-MCNC: 6 MG/DL (ref 7–30)
CALCIUM SERPL-MCNC: 8.3 MG/DL (ref 8.5–10.1)
CHLORIDE SERPL-SCNC: 108 MMOL/L (ref 94–109)
CO2 SERPL-SCNC: 25 MMOL/L (ref 20–32)
COLOR UR AUTO: YELLOW
CREAT SERPL-MCNC: 0.87 MG/DL (ref 0.66–1.25)
DIFFERENTIAL METHOD BLD: ABNORMAL
EOSINOPHIL # BLD AUTO: 0.2 10E9/L (ref 0–0.7)
EOSINOPHIL NFR BLD AUTO: 10.2 %
ERYTHROCYTE [DISTWIDTH] IN BLOOD BY AUTOMATED COUNT: 15.4 % (ref 10–15)
GFR SERPL CREATININE-BSD FRML MDRD: ABNORMAL ML/MIN/1.7M2
GLUCOSE BLDC GLUCOMTR-MCNC: 131 MG/DL (ref 70–99)
GLUCOSE BLDC GLUCOMTR-MCNC: 180 MG/DL (ref 70–99)
GLUCOSE SERPL-MCNC: 146 MG/DL (ref 70–99)
GLUCOSE UR STRIP-MCNC: NEGATIVE MG/DL
HCT VFR BLD AUTO: 37.3 % (ref 40–53)
HGB BLD-MCNC: 12.6 G/DL (ref 13.3–17.7)
HGB UR QL STRIP: NEGATIVE
IMM GRANULOCYTES # BLD: 0 10E9/L (ref 0–0.4)
IMM GRANULOCYTES NFR BLD: 0 %
INR PPP: 1.45 (ref 0.86–1.14)
KETONES UR STRIP-MCNC: NEGATIVE MG/DL
LEUKOCYTE ESTERASE UR QL STRIP: NEGATIVE
LYMPHOCYTES # BLD AUTO: 0.4 10E9/L (ref 0.8–5.3)
LYMPHOCYTES NFR BLD AUTO: 24.1 %
MCH RBC QN AUTO: 31.9 PG (ref 26.5–33)
MCHC RBC AUTO-ENTMCNC: 33.8 G/DL (ref 31.5–36.5)
MCV RBC AUTO: 94 FL (ref 78–100)
MONOCYTES # BLD AUTO: 0.2 10E9/L (ref 0–1.3)
MONOCYTES NFR BLD AUTO: 9.6 %
NEUTROPHILS # BLD AUTO: 0.9 10E9/L (ref 1.6–8.3)
NEUTROPHILS NFR BLD AUTO: 55.5 %
NITRATE UR QL: NEGATIVE
NRBC # BLD AUTO: 0 10*3/UL
NRBC BLD AUTO-RTO: 0 /100
PH UR STRIP: 5.5 PH (ref 5–7)
PHENYTOIN SERPL-MCNC: 3.5 MG/L (ref 10–20)
PLATELET # BLD AUTO: 42 10E9/L (ref 150–450)
POTASSIUM SERPL-SCNC: 3.9 MMOL/L (ref 3.4–5.3)
PROT SERPL-MCNC: 6.5 G/DL (ref 6.8–8.8)
RBC # BLD AUTO: 3.95 10E12/L (ref 4.4–5.9)
SODIUM SERPL-SCNC: 137 MMOL/L (ref 133–144)
SP GR UR STRIP: 1.02 (ref 1–1.03)
URN SPEC COLLECT METH UR: NORMAL
UROBILINOGEN UR STRIP-MCNC: NORMAL MG/DL (ref 0–2)
WBC # BLD AUTO: 1.7 10E9/L (ref 4–11)

## 2017-07-29 PROCEDURE — 70450 CT HEAD/BRAIN W/O DYE: CPT

## 2017-07-29 PROCEDURE — 80053 COMPREHEN METABOLIC PANEL: CPT | Performed by: INTERNAL MEDICINE

## 2017-07-29 PROCEDURE — 85610 PROTHROMBIN TIME: CPT | Performed by: INTERNAL MEDICINE

## 2017-07-29 PROCEDURE — A9270 NON-COVERED ITEM OR SERVICE: HCPCS | Mod: GY | Performed by: STUDENT IN AN ORGANIZED HEALTH CARE EDUCATION/TRAINING PROGRAM

## 2017-07-29 PROCEDURE — A9270 NON-COVERED ITEM OR SERVICE: HCPCS | Mod: GY | Performed by: INTERNAL MEDICINE

## 2017-07-29 PROCEDURE — 99291 CRITICAL CARE FIRST HOUR: CPT | Mod: Z6 | Performed by: INTERNAL MEDICINE

## 2017-07-29 PROCEDURE — 76705 ECHO EXAM OF ABDOMEN: CPT | Mod: XS

## 2017-07-29 PROCEDURE — 85025 COMPLETE CBC W/AUTO DIFF WBC: CPT | Performed by: INTERNAL MEDICINE

## 2017-07-29 PROCEDURE — 36415 COLL VENOUS BLD VENIPUNCTURE: CPT | Performed by: STUDENT IN AN ORGANIZED HEALTH CARE EDUCATION/TRAINING PROGRAM

## 2017-07-29 PROCEDURE — 99285 EMERGENCY DEPT VISIT HI MDM: CPT | Mod: 25 | Performed by: INTERNAL MEDICINE

## 2017-07-29 PROCEDURE — 71020 XR CHEST 2 VW: CPT

## 2017-07-29 PROCEDURE — 74020 XR ABDOMEN 2 VW: CPT

## 2017-07-29 PROCEDURE — 00000146 ZZHCL STATISTIC GLUCOSE BY METER IP

## 2017-07-29 PROCEDURE — 87040 BLOOD CULTURE FOR BACTERIA: CPT | Performed by: STUDENT IN AN ORGANIZED HEALTH CARE EDUCATION/TRAINING PROGRAM

## 2017-07-29 PROCEDURE — 84443 ASSAY THYROID STIM HORMONE: CPT | Performed by: STUDENT IN AN ORGANIZED HEALTH CARE EDUCATION/TRAINING PROGRAM

## 2017-07-29 PROCEDURE — 25000131 ZZH RX MED GY IP 250 OP 636 PS 637: Mod: GY | Performed by: STUDENT IN AN ORGANIZED HEALTH CARE EDUCATION/TRAINING PROGRAM

## 2017-07-29 PROCEDURE — 12000001 ZZH R&B MED SURG/OB UMMC

## 2017-07-29 PROCEDURE — 80185 ASSAY OF PHENYTOIN TOTAL: CPT | Performed by: INTERNAL MEDICINE

## 2017-07-29 PROCEDURE — 25000132 ZZH RX MED GY IP 250 OP 250 PS 637: Mod: GY | Performed by: INTERNAL MEDICINE

## 2017-07-29 PROCEDURE — 81003 URINALYSIS AUTO W/O SCOPE: CPT | Performed by: INTERNAL MEDICINE

## 2017-07-29 PROCEDURE — 99223 1ST HOSP IP/OBS HIGH 75: CPT | Mod: AI | Performed by: HOSPITALIST

## 2017-07-29 PROCEDURE — 25000132 ZZH RX MED GY IP 250 OP 250 PS 637: Mod: GY | Performed by: STUDENT IN AN ORGANIZED HEALTH CARE EDUCATION/TRAINING PROGRAM

## 2017-07-29 PROCEDURE — 82140 ASSAY OF AMMONIA: CPT | Performed by: INTERNAL MEDICINE

## 2017-07-29 RX ORDER — NALOXONE HYDROCHLORIDE 0.4 MG/ML
.1-.4 INJECTION, SOLUTION INTRAMUSCULAR; INTRAVENOUS; SUBCUTANEOUS
Status: DISCONTINUED | OUTPATIENT
Start: 2017-07-29 | End: 2017-08-01 | Stop reason: HOSPADM

## 2017-07-29 RX ORDER — LACTULOSE 10 G/15ML
20 SOLUTION ORAL ONCE
Status: COMPLETED | OUTPATIENT
Start: 2017-07-29 | End: 2017-07-29

## 2017-07-29 RX ORDER — MULTIPLE VITAMINS W/ MINERALS TAB 9MG-400MCG
1 TAB ORAL 2 TIMES DAILY
Status: DISCONTINUED | OUTPATIENT
Start: 2017-07-29 | End: 2017-08-01 | Stop reason: HOSPADM

## 2017-07-29 RX ORDER — LACTULOSE 10 G/15ML
20 SOLUTION ORAL
Status: DISCONTINUED | OUTPATIENT
Start: 2017-07-29 | End: 2017-08-01 | Stop reason: HOSPADM

## 2017-07-29 RX ORDER — LACTULOSE 10 G/15ML
30 SOLUTION ORAL 2 TIMES DAILY PRN
Status: DISCONTINUED | OUTPATIENT
Start: 2017-07-29 | End: 2017-08-01 | Stop reason: HOSPADM

## 2017-07-29 RX ORDER — PRAVASTATIN SODIUM 20 MG
80 TABLET ORAL DAILY
Status: DISCONTINUED | OUTPATIENT
Start: 2017-07-29 | End: 2017-08-01 | Stop reason: HOSPADM

## 2017-07-29 RX ORDER — FERROUS SULFATE 325(65) MG
325 TABLET ORAL 2 TIMES DAILY
Status: DISCONTINUED | OUTPATIENT
Start: 2017-07-29 | End: 2017-08-01 | Stop reason: HOSPADM

## 2017-07-29 RX ORDER — HYDROXYZINE HYDROCHLORIDE 25 MG/1
25 TABLET, FILM COATED ORAL 2 TIMES DAILY
Status: DISCONTINUED | OUTPATIENT
Start: 2017-07-29 | End: 2017-08-01 | Stop reason: HOSPADM

## 2017-07-29 RX ORDER — LACTULOSE 10 G/15ML
100 SOLUTION ORAL
Status: DISCONTINUED | OUTPATIENT
Start: 2017-07-29 | End: 2017-08-01 | Stop reason: HOSPADM

## 2017-07-29 RX ORDER — LEVOTHYROXINE SODIUM 88 UG/1
88 TABLET ORAL DAILY
Status: DISCONTINUED | OUTPATIENT
Start: 2017-07-29 | End: 2017-08-01 | Stop reason: HOSPADM

## 2017-07-29 RX ORDER — LANOLIN ALCOHOL/MO/W.PET/CERES
1000 CREAM (GRAM) TOPICAL DAILY
Status: DISCONTINUED | OUTPATIENT
Start: 2017-07-29 | End: 2017-08-01 | Stop reason: HOSPADM

## 2017-07-29 RX ORDER — NICOTINE POLACRILEX 4 MG
15-30 LOZENGE BUCCAL
Status: DISCONTINUED | OUTPATIENT
Start: 2017-07-29 | End: 2017-08-01 | Stop reason: HOSPADM

## 2017-07-29 RX ORDER — DEXTROSE MONOHYDRATE 25 G/50ML
25-50 INJECTION, SOLUTION INTRAVENOUS
Status: DISCONTINUED | OUTPATIENT
Start: 2017-07-29 | End: 2017-08-01 | Stop reason: HOSPADM

## 2017-07-29 RX ORDER — MIRTAZAPINE 15 MG/1
15 TABLET, FILM COATED ORAL AT BEDTIME
Status: DISCONTINUED | OUTPATIENT
Start: 2017-07-29 | End: 2017-08-01 | Stop reason: HOSPADM

## 2017-07-29 RX ORDER — LANOLIN ALCOHOL/MO/W.PET/CERES
100 CREAM (GRAM) TOPICAL DAILY
Status: DISCONTINUED | OUTPATIENT
Start: 2017-07-29 | End: 2017-08-01 | Stop reason: HOSPADM

## 2017-07-29 RX ADMIN — PRAVASTATIN SODIUM 80 MG: 20 TABLET ORAL at 19:04

## 2017-07-29 RX ADMIN — Medication 5 MG: at 22:12

## 2017-07-29 RX ADMIN — Medication 100 MG: at 19:01

## 2017-07-29 RX ADMIN — EXTENDED PHENYTOIN SODIUM 120 MG: 30 CAPSULE ORAL at 19:03

## 2017-07-29 RX ADMIN — FERROUS SULFATE TAB 325 MG (65 MG ELEMENTAL FE) 325 MG: 325 (65 FE) TAB at 19:00

## 2017-07-29 RX ADMIN — OMEPRAZOLE 40 MG: 20 CAPSULE, DELAYED RELEASE ORAL at 19:01

## 2017-07-29 RX ADMIN — LEVOTHYROXINE SODIUM 88 MCG: 88 TABLET ORAL at 19:03

## 2017-07-29 RX ADMIN — MIRTAZAPINE 15 MG: 15 TABLET, FILM COATED ORAL at 22:13

## 2017-07-29 RX ADMIN — LACTULOSE 20 G: 20 SOLUTION ORAL at 15:17

## 2017-07-29 RX ADMIN — HYDROXYZINE HYDROCHLORIDE 25 MG: 25 TABLET ORAL at 19:01

## 2017-07-29 RX ADMIN — INSULIN ASPART 2 UNITS: 100 INJECTION, SOLUTION INTRAVENOUS; SUBCUTANEOUS at 21:13

## 2017-07-29 RX ADMIN — MULTIPLE VITAMINS W/ MINERALS TAB 1 TABLET: TAB at 19:00

## 2017-07-29 RX ADMIN — CYANOCOBALAMIN TAB 1000 MCG 1000 MCG: 1000 TAB at 19:00

## 2017-07-29 RX ADMIN — RIFAXIMIN 550 MG: 550 TABLET ORAL at 19:00

## 2017-07-29 RX ADMIN — VITAMIN D, TAB 1000IU (100/BT) 1000 UNITS: 25 TAB at 19:00

## 2017-07-29 ASSESSMENT — ENCOUNTER SYMPTOMS
CHILLS: 1
ABDOMINAL PAIN: 1
FEVER: 0

## 2017-07-29 NOTE — LETTER
Transition Communication Hand-off for Care Transitions to Next Level of Care Provider    Name: Mono Varghese  MRN #: [id number]  Primary Care Provider: King Louis     Primary Clinic: [facility] [address] [place] [address]  [facility] [address]     Reason for Hospitalization:  Portal vein thrombosis [I81]  Hepatic encephalopathy (H) [K72.90]  End-stage liver disease (H) [K72.90]  Admit Date/Time: 7/29/2017 11:29 AM  Discharge Date: 8/1/17  Payor Source: Payor: MEDICARE / Plan: MEDICARE / Product Type: Medicare /     Reason for Communication Hand-off Referral: Fragility  Multiple providers/specialties    Discharge Plan: home with [facility]       Concern for non-adherence with plan of care:   Y/N n  Discharge Needs Assessment:  Needs       Most Recent [facility]    Home Care [facility] [phone], Fax: [phone]          Already enrolled in Tele-monitoring program and name of program:    Follow-up specialty is recommended: Yes    Follow-up plan:  Future Appointments  Date Time Provider Department Center   10/18/2017 9:00 AM  LAB [facility]   10/18/2017 9:50 AM Beatriz Tanner MD [facility]   10/19/2017 1:30 PM Anil English MD [facility]   4/27/2018 11:30 AM [facility]   5/4/2018 11:00 AM Lauro Shaw MD [facility]       Any outstanding tests or procedures:        Referrals     Future Labs/Procedures    Home care nursing referral     Comments:    [facility] [phone]  Fax [phone]    RN skilled nursing visit. RN to assess vital signs and weight, respiratory and cardiac status, pain level and activity tolerance, hydration, nutrition and bowel status and home safety.  RN to teach medication management.    Your provider has ordered home care nursing services. If you have not been contacted within 2 days of your discharge please call the inpatient department phone number at [phone] .    Home Care OT Referral for Hospital  Discharge     Comments:    [facility] [phone]  Fax [phone]    OT to eval and treat    Your provider has ordered home care - occupational therapy. If you have not been contacted within 2 days of your discharge please call the department phone number listed on the top of this document.    Home Care PT Referral for Hospital Discharge     Comments:    [facility] [phone]  Fax [phone]    PT to eval and treat    Your provider has ordered home care - physical therapy. If you have not been contacted within 2 days of your discharge please call the department phone number listed on the top of this document.    Home Care Social Service Referral for Hospital Discharge     Comments:    [facility] [phone]  Fax [phone]     to assist care giver with strategies to deal with behaviors     Your provider has ordered home care - . If you have not been contacted within 2 days of your discharge please call the department phone number listed on the top of this document.    Medication Therapy Management Referral     Comments:    Reason for referral:  on more than 10 medications and hospitalized or in the ED in the past 6 months     This service is designed to help you get the most from your medications.  A specially trained pharmacist will work closely with you and your doctors  to solve any problems related to your medications and to help you get the   best results from taking them.      The Medication Therapy Management staff will call you to schedule an appointment.    NEUROPSYCHOLOGY REFERRAL     Comments:    Your provider has referred you to:    PREFERRED PROVIDERS:    All scheduling is subject to the client's specific insurance plan & benefits, provider/location availability, and provider clinical specialities.  Please arrive 15 minutes early for your first appointment and bring your completed paperwork.    Please be aware that coverage of  these services is subject to the terms and limitations of your health insurance plan.  Call member services at your health plan with any benefit or coverage questions.    Please bring the following to your appointment:  >>   Any x-rays, CTs or MRIs which have been performed.  Contact the facility where they were done to arrange for  prior to your scheduled appointment.  Any new CT, MRI or other procedures ordered by your specialist must be performed at a [place] facility or coordinated by your clinic's referral office.    >>   List of current medications   >>   This referral request   >>   Any documents/labs given to you for this referral    [facility] Health Psychology Referral     Comments:    Your provider has referred you to:  PREFERRED PROVIDERS:    Please be aware that coverage of these services is subject to the terms and limitations of your health insurance plan.  Call member services at your health plan with any benefit or coverage questions.      Please bring the following to your appointment:    >>   Any x-rays, CTs or MRIs which have been performed.  Contact the facility where they were done to arrange for  prior to your scheduled appointment.   >>   List of current medications   >>   This referral request   >>   Any documents/labs given to you for this referral            Key Recommendations:      Kyleigh Aburto    AVS/Discharge Summary is the source of truth; this is a helpful guide for improved communication of patient story

## 2017-07-29 NOTE — H&P
[facility] History and Physical    Mono Varghese MRN# [id number]   Age: 46 year old YOB: 1970     Date of Admission:  7/29/2017      Primary care provider: [place]          Assessment and Plan:   Mr. Varghese is a 46 year old male with a history of cirrhosis, left parietal astrocytoma s/p partial resection, chemo, and radiation complicated by seizures who presents with 1.5 weeks increasing confusion and 4 days  abdominal pain and increased abdominal girth. Differential for AMS in the setting of known cirrhosis and intracranial abnormalities is broad. Problems and plans are outlined below    #AMS  #Seizures   Dx with cirrhosis 2 yrs ago, with left parietal astrocytoma dx in 2012, now s/p partial resection, chenotherapy and radiation with seizures, now on phenytoin with subclinical levels. EEG read yesterday showing no focal seizure activity with diffuse encephalopathy. US showed minimal ascites and stable partially occluding portal venous thrombus. Ammonia level was elevated.  Differential includes possible hepatic encephalopathy in the setting of reported wt gain and abdominal girth, however minimal ascites is seen on US exam. Low suspition for current GI bleed with lack of hx, stable H/H and low BUN. Neuro hx complicates AMS picture as this could be as a result of progressing intracranial pathology/post treatment changes. Phenytoin levels also subtherapeutic so possibility of subclinical seizures as cause of AMS.  IRN is elevated and with reports of falls and gait imbalance, subacute evolving subdural hematoma should be considered. Checking other endocrine or drug related causes.   - Neuro Consulted to assess phenytoin dosing, utility of EEG monitoring inpatient, and behavioral changes, appreciate recs  - OT consulted to perform MOCA  - Head CT for bleed - negative  - Sepsis workup, blood and urine cx, CXR ordered. Stable without qSOFA or SIRS, low threshold to start  antibiotics  - TSH to assess thyroid/hypothyroid picture  - Continuing phenytoin, possible dose increase with neuros recs  - Continue rifaximin, lactulose protocol with  titration to 4-5 loose stools per day  - Holding home trazodone and gabapentin due to possible sedating effects    #Abd pain  #Cirrhosis  Cirrhosis dx 2 yrs ago. Reported past hepatic encephalopathy/SBP and varicose bleed. US with minimal ascites. Concern for SBP is low due to stable picture with minimal fluid collections. Pain may be related to bowel distention as it is colicky in nature.   - Sepsis workup to evaluate possible infectious picture causing pain  - No antibiotics at this time due to stable vitals and presentation, low threshold to start  - Abd xray to evaluate for gas/distention  - Continue rifaximin, lactulose protocol with  titration to 4-5 loose stools per day    #Pancytopenia  Chronic, stable.  - continue home iron    #DMII  - Holding home dulaglutide and glipizide  - sliding scale for correction    #Hypothyroidism  Assessing for current hypothyroid picture with most recent TSH elevated  - Continue home levothyroxine     #Depression  - Continuing home mirtazapine         FEN: Low Na diet  Lines: PIV  DVT: Mechanical   Code status: DNR/DNI  Dispo: Pending clinical course     Clinical decision making discussed with Dr. Patiño who is in agreement with the above plan.     Lucina Damico MD  Internal Medicine PGY1  [phone]            Chief Complaint:   AMS and abdominal pain/distention      History is obtained from the patient and his wife         History of Present Illness:   Mr. Varghese is a 46 year old male with a history of cirrhosis, left parietal astrocytoma s/p partial resection, chemo, and radiation complicated by seizures who presents with AMS and increased abdominal pain and girth. Wife states he has confusion at baseline with forgetfulness but that over the last 1-1.5 weeks it has increased. She explains now he  "forgets where they are going after being told multiple times and that he forgets to tell her when he is hungry or in pain. She also notes he has had some behavior changes and has been saying some inappropriate/uncharacteristic things which he later forgets. Wife reports she has increased difficulty waking him at times and that he falls asleep rah frequently than usual. He has also had balance/gait issues that have gotten worse over the last several days.     He has had increased abdominal girth and pain that started 3-4 days ago. He states it is epigastric/upper abdomen and notes it is an intermittent sharp to pressure like pain. Wife notes his abdominal girth has increased from 99cm-108cm. They went to [place] ED yesterday and were told there was no remarkable fluid collection to be drained but wife feels his stomach is much tighter than usual and he complains of feeling he has a \"pregnant\" belly. He takes rifaxamin daily but has stopped taking his lactulose due to increased loose stool production. He is currently at 5-7 soft formed stools per day. Wife also notes increased cough and hiccups. He had previously had similar symptoms and was found to have variocele bleed 2 yrs ago. Recent endoscopies reportedly without recurrence. He endorses 15-20 lbs wt gain, chills, fatigue, balance disturbances/difficulty walking, muscle pains, lightheadedness with standing.  He denies SOB, CP, Fevers, hemoptysis, dark or red stools    In the ED, ammonia was elevated to 60, phenotoyin level low at 3.5. Abd US was performed showing mild ascites and chronic, stable, partially occlusive portal venous thrombus. EEG with read from yesterday showing mild diffuse nonspecific encephalopathy but no evidence of seizures at that time.             Past Medical History:     Past Medical History:   Diagnosis Date     Brain tumor (H)      Liver failure (H)     anemia         Past Surgical History:      Past Surgical History:   Procedure " Laterality Date     COLONOSCOPY N/A 2015    Procedure: COMBINED COLONOSCOPY, SINGLE OR MULTIPLE BIOPSY/POLYPECTOMY BY BIOPSY;  Surgeon: Ran Thurston MD;  Location:  GI     ESOPHAGOSCOPY, GASTROSCOPY, DUODENOSCOPY (EGD), COMBINED N/A 2015    Procedure: COMBINED ESOPHAGOSCOPY, GASTROSCOPY, DUODENOSCOPY (EGD);  Surgeon: Rocale Rizvi MD;  Location:  OR     ESOPHAGOSCOPY, GASTROSCOPY, DUODENOSCOPY (EGD), COMBINED N/A 2017    Procedure: COMBINED ESOPHAGOSCOPY, GASTROSCOPY, DUODENOSCOPY (EGD), BIOPSY SINGLE OR MULTIPLE;  Surgeon: Rocael Tejada MD;  Location:  GI     ESOPHAGOSCOPY, GASTROSCOPY, DUODENOSCOPY (EGD), COMBINED N/A 3/30/2017    Procedure: COMBINED ESOPHAGOSCOPY, GASTROSCOPY, DUODENOSCOPY (EGD);  Surgeon: Jordana Ramirez MD;  Location:  GI     OPTICAL TRACKING SYSTEM CRANIOTOMY, EXCISE TUMOR, COMBINED  2013    Procedure: COMBINED OPTICAL TRACKING SYSTEM CRANIOTOMY, EXCISE TUMOR;  Left Stealth Guided Craniotomy , Tumor Resection ;  Surgeon: J Carlos Aranda MD;  Location:  OR     ORTHOPEDIC SURGERY      hand surgery- right     SIGMOIDOSCOPY FLEXIBLE N/A 2015    Procedure: SIGMOIDOSCOPY FLEXIBLE;  Surgeon: Rocael Rizvi MD;  Location:  GI             Social History:     Alcohol use: 2 yrs sober, before 12 pack 2-3x wk 2 yrs, 1xwk 8 yrs  Drug use: none  Smoke: none             Family History:     Family History   Problem Relation Age of Onset     Unknown/Adopted Mother      Father  of alcoholic cirrhosis          Allergies:     Allergies   Allergen Reactions     No Clinical Screening - See Comments      Coban and Surgilast cause itching     Tegaderm Transparent Dressing (Informational Only)      Latex Itching and Rash             Medications:     Prescriptions Prior to Admission   Medication Sig Dispense Refill Last Dose     cyanocobalamin 1000 MCG TABS 1,000 mcg by Per G Tube route daily 30 tablet 0 2017 at Unknown time      cholecalciferol (VITAMIN  -D) 1000 UNITS capsule Take 1 capsule (1,000 Units) by mouth daily 90 capsule 1 7/28/2017 at Unknown time     DILANTIN 30 MG CR capsule Take 3 caps ( 90 mg ) each AM and 4 caps (120 mg ) each evening 210 capsule 1 7/28/2017 at Unknown time     dulaglutide (TRULICITY) 0.75 MG/0.5ML pen Inject 0.75 mg Subcutaneous every 7 days 2 mL 3 Past Week at Unknown time     gabapentin (NEURONTIN) 100 MG capsule Take 1 capsule (100 mg) by mouth At Bedtime 30 capsule 3 7/28/2017 at Unknown time     traZODone (DESYREL) 50 MG tablet Take 1.5 tablets (75 mg) by mouth nightly as needed for sleep 45 tablet 3 7/27/2017 at Unknown time     mirtazapine (REMERON) 15 MG tablet Take 1 tablet (15 mg) at bedtime. 30 tablet 0 7/28/2017 at Unknown time     HYDROcodone-acetaminophen (NORCO) 5-325 MG per tablet Take 2 tablets by mouth every 6 hours as needed for moderate to severe pain 60 tablet 0 More than a month at Unknown time     phenytoin (DILANTIN) 30 MG CR capsule 1 cap twice a day 60 capsule 11 7/28/2017 at Unknown time     phenytoin (DILANTIN) 100 MG CR capsule Take 7 tabs two times today or 12 hrs apart 14 capsule 0 7/28/2017 at Unknown time     phenytoin (DILANTIN) 30 MG CR capsule Take 1 cap each AM and 2 cap each PM 90 capsule 11 7/28/2017 at Unknown time     thiamine 100 MG tablet Take 1 tablet (100 mg) by mouth daily 100 tablet 1 7/28/2017 at Unknown time     glipiZIDE (GLUCOTROL XL) 10 MG 24 hr tablet Take 1 tablet (10 mg) by mouth daily 90 tablet 1 7/28/2017 at Unknown time     pravastatin (PRAVACHOL) 80 MG tablet Take 1 tablet (80 mg) by mouth daily 90 tablet 1 7/28/2017 at Unknown time     hydrOXYzine (ATARAX) 25 MG tablet Take 1 tablet (25 mg) by mouth 2 times daily 180 tablet 1 7/27/2017 at Unknown time     rifaximin (XIFAXAN) 550 MG TABS tablet Take 1 tablet (550 mg) by mouth 2 times daily 60 tablet 11 7/28/2017 at Unknown time     blood glucose monitoring (ONE TOUCH ULTRA) test strip Use to test  blood sugars 4 times daily as needed or as directed. 300 each 4 7/28/2017 at Unknown time     levothyroxine (SYNTHROID/LEVOTHROID) 88 MCG tablet Take 1 tablet (88 mcg) by mouth daily 90 tablet 3 7/28/2017 at Unknown time     omeprazole (PRILOSEC) 20 MG CR capsule Take 2 capsules (40 mg) by mouth 2 times daily 120 capsule 11 7/28/2017 at Unknown time     ferrous sulfate (IRON) 325 (65 FE) MG tablet Take 1 tablet (325 mg) by mouth 2 times daily 200 tablet 3 7/28/2017 at Unknown time     lactulose 20 GM/30ML SOLN Take 30 mLs by mouth every morning (Patient taking differently: Take 30 mLs by mouth every other day ) 946 mL 11 Past Month at Unknown time     sulfamethoxazole-trimethoprim (BACTRIM DS,SEPTRA DS) 800-160 MG per tablet Take 1 tablet by mouth 2 times daily (Patient taking differently: Take 1 tablet by mouth every morning ) 60 tablet 5 7/28/2017 at Unknown time     Phenytoin (DILANTIN PO) Take 30 mg by mouth every morning    7/28/2017 at Unknown time     Calcium Carb-Cholecalciferol (CALCIUM 500 +D) 500-400 MG-UNIT TABS Take 500 mg by mouth daily (Patient taking differently: Take 500 mg by mouth every morning ) 90 tablet 1 7/28/2017 at Unknown time     hypromellose (ARTIFICIAL TEARS) 0.4 % SOLN Place 2 drops into both eyes every 8 hours 1 Bottle 11 Past Week at Unknown time     multivitamin, therapeutic with minerals (THERA-VIT-M) TABS Take 1 tablet by mouth 2 times daily   7/27/2017 at Unknown time     melatonin 5 MG tablet Take 5 mg by mouth nightly as needed for sleep   7/27/2017 at Unknown time     ONE TOUCH LANCETS MISC by In Vitro route 4 times daily as needed 100 each prn 7/28/2017 at Unknown time             Review of Systems:   General: no fevers, +chills, weight gain , +fatigue  Ears/Nose/Throat: no sinus congestion, sore throat, rhirorrhea, or ear pain  Eyes: Increased blurry vision   Skin: No rashes or lesions.   Respiratory: no shortness of breath at rest, + non productive cough and  hiccups  Cardiovascular: no chest pain, +lower extremity swelling, no palpitations.   Gastrointestinal: no abdominal pain, nausea, vomiting, diarrhea, constipation  Genitourinary: no frequency, urgency or dysuria  MSK: no arthralgias, +myalgias  Neuro: no headaches, weakness or numbness.  Psych: no mood changes  Heme: no bleeding or bruising  Endocrine: no polyuria/polydipsia, no skin changes, no temperature intolerance.              Physical Exam:   Vitals were reviewed  /84  Pulse 72  Temp 97.8  F (36.6  C) (Oral)  Resp 18  Wt 95.6 kg (210 lb 12.2 oz)  SpO2 98%  BMI 30.24 kg/m2    Exam:  General: the patient is pleasant, resting comfortably, no acute distress.   HEET: Normocephalic, atraumatic.  Neck: No JVD  Lungs: Clear to auscultation, no wheezes or crackles.   CV: RRR, nl s1 and s2, no m/r/g.   Abdomen: Soft, distended, mildly and diffusely tender to palpation . No rebound or guarding. Bowel sounds active.  Extremities: No lower extremity edema. Peripheral pulses strong and symmetric.   Skin: no appreciable skin lesions/rashes on exposed skin.   Neuro: Alert and oriented to person place situation but not time, grossly normal neurologic exam with cranial nerves, strength, and reflexes in tact. No asterixis but mild tremor   Psych: normal affect, mostly answering questions appropriately although some pauses with forgetting what he was saying or difficulty with questions. No obvious depression or anxiety.           Data:   Labs:   Results for orders placed or performed during the hospital encounter of 07/29/17 (from the past 24 hour(s))   POC US ABDOMEN LIMITED    Impression    Limited Bedside Abdominal Ultrasound, performed and interpreted by me.     Indication: Abdominal swelling. Evaluate for Free fluid.     With the patient in Trendelenburg, the RUQ, LUQ, (including the paracolic gutters) views were examined for free fluid. With the patient in reverse Trendelenburg, the super pubic view was  examined for free fluid.     Findings: Free fluid present in RUQ, LUQ, Pelvis    IMPRESSION: Free fluid present as noted above. Clinically most likely ascites.   Comprehensive metabolic panel   Result Value Ref Range    Sodium 137 133 - 144 mmol/L    Potassium 3.9 3.4 - 5.3 mmol/L    Chloride 108 94 - 109 mmol/L    Carbon Dioxide 25 20 - 32 mmol/L    Anion Gap 5 3 - 14 mmol/L    Glucose 146 (H) 70 - 99 mg/dL    Urea Nitrogen 6 (L) 7 - 30 mg/dL    Creatinine 0.87 0.66 - 1.25 mg/dL    GFR Estimate >90  Non  GFR Calc   >60 mL/min/1.7m2    GFR Estimate If Black >90   GFR Calc   >60 mL/min/1.7m2    Calcium 8.3 (L) 8.5 - 10.1 mg/dL    Bilirubin Total 1.3 0.2 - 1.3 mg/dL    Albumin 3.1 (L) 3.4 - 5.0 g/dL    Protein Total 6.5 (L) 6.8 - 8.8 g/dL    Alkaline Phosphatase 200 (H) 40 - 150 U/L    ALT 40 0 - 70 U/L    AST 50 (H) 0 - 45 U/L   CBC with platelets differential   Result Value Ref Range    WBC 1.7 (L) 4.0 - 11.0 10e9/L    RBC Count 3.95 (L) 4.4 - 5.9 10e12/L    Hemoglobin 12.6 (L) 13.3 - 17.7 g/dL    Hematocrit 37.3 (L) 40.0 - 53.0 %    MCV 94 78 - 100 fl    MCH 31.9 26.5 - 33.0 pg    MCHC 33.8 31.5 - 36.5 g/dL    RDW 15.4 (H) 10.0 - 15.0 %    Platelet Count 42 (LL) 150 - 450 10e9/L    Diff Method Automated Method     % Neutrophils 55.5 %    % Lymphocytes 24.1 %    % Monocytes 9.6 %    % Eosinophils 10.2 %    % Basophils 0.6 %    % Immature Granulocytes 0.0 %    Nucleated RBCs 0 0 /100    Absolute Neutrophil 0.9 (L) 1.6 - 8.3 10e9/L    Absolute Lymphocytes 0.4 (L) 0.8 - 5.3 10e9/L    Absolute Monocytes 0.2 0.0 - 1.3 10e9/L    Absolute Eosinophils 0.2 0.0 - 0.7 10e9/L    Absolute Basophils 0.0 0.0 - 0.2 10e9/L    Abs Immature Granulocytes 0.0 0 - 0.4 10e9/L    Absolute Nucleated RBC 0.0    Ammonia (on ice)   Result Value Ref Range    Ammonia 60 (H) 10 - 50 umol/L   INR   Result Value Ref Range    INR 1.45 (H) 0.86 - 1.14   Phenytoin level   Result Value Ref Range    Phenytoin Level 3.5  (L) 10 - 20 mg/L   US Abdomen Limited w Doppler Complete    Narrative    EXAMINATION: US ABDOMEN LIMITED WITH DOPPLER COMPLETE, 7/29/2017 1:14  PM     COMPARISON: Ultrasound abdomen dated 7/19/2017    HISTORY: Follow-up portal vein thrombosis    TECHNIQUE: The abdomen was scanned in standard fashion with  specialized ultrasound transducer(s) using both grey scale, color  Doppler, and spectral flow techniques.    Findings:    Liver: The liver shows nodular contour. It measures 18.4 cm in  craniocaudal dimension. The echogenicity is coarsened. No evidence of  focal hepatic mass. Extrahepatic portal vein flow is antegrade,  measuring 24 cm/sec.  Redemonstration of echogenic thrombus in the left portal vein, which  is nonocclusive.  Right portal vein flow is antegrade, measuring 20 cm/sec.    Flow in the hepatic artery is towards the liver and:  72 cm/sec peak systolic  0.6 resistive index.     The splenic vein is patent and flow is towards the liver.  The left,  middle, and right hepatic veins are patent with flow towards the IVC.  The IVC is patent with flow towards the heart.   The aorta is not  dilated. Periumbilical vein noted.    Gallbladder: The gallbladder is partially distended. No stones are  detected. Mild prominence of the gallbladder wall, likely related to  the presence of ascites and underlying liver disease.    Bile Ducts: Both the intra- and extrahepatic biliary system are of  normal caliber.  The common bile duct measures 3 mm in diameter.    Kidneys: Right kidney is of normal echotexture, without mass nor  hydronephrosis.   The craniocaudal dimensions are: right- 10.6 cm,     Fluid: mild ascites.      Impression    Impression:   1. Cirrhotic appearance of the liver without focal mass.  2. Redemonstration of nonocclusive thrombus in the left portal vein,  unchanged compared with previous ultrasound dated 7/19/2017.  3. Patent main portal vein and hepatic arteries.    I have personally reviewed the  examination and initial interpretation  and I agree with the findings.    DAYA SHAW MD   UA reflex to Microscopic and Culture   Result Value Ref Range    Color Urine Yellow     Appearance Urine Clear     Glucose Urine Negative NEG mg/dL    Bilirubin Urine Negative NEG    Ketones Urine Negative NEG mg/dL    Specific Gravity Urine 1.016 1.003 - 1.035    Blood Urine Negative NEG    pH Urine 5.5 5.0 - 7.0 pH    Protein Albumin Urine Negative NEG mg/dL    Urobilinogen mg/dL Normal 0.0 - 2.0 mg/dL    Nitrite Urine Negative NEG    Leukocyte Esterase Urine Negative NEG    Source Midstream Urine      *Note: Due to a large number of results and/or encounters for the requested time period, some results have not been displayed. A complete set of results can be found in Results Review.      .     Imaging studies:    CT head  Impression:   1. No acute intracranial pathology.  2. Stable postoperative changes of left sided craniotomy and left  parietal lobe resection cavity with surrounding gliosis.      US abd    Fluid: mild ascites.  Impression:   1. Cirrhotic appearance of the liver without focal mass.  2. Redemonstration of nonocclusive thrombus in the left portal vein,  unchanged compared with previous ultrasound dated 7/19/2017.  3. Patent main portal vein and hepatic arteries.

## 2017-07-29 NOTE — IP AVS SNAPSHOT
[address] [address]    Phone:  [phone]                                       After Visit Summary   7/29/2017    Mono Varghese    MRN: [id number]           After Visit Summary Signature Page     I have received my discharge instructions, and my questions have been answered. I have discussed any challenges I see with this plan with the nurse or doctor.    ..........................................................................................................................................  Patient/Patient Representative Signature      ..........................................................................................................................................  Patient Representative Print Name and Relationship to Patient    ..................................................               ................................................  Date                                            Time    ..........................................................................................................................................  Reviewed by Signature/Title    ...................................................              ..............................................  Date                                                            Time

## 2017-07-29 NOTE — ED PROVIDER NOTES
"  History     Chief Complaint   Patient presents with     Abdominal Pain     Altered Mental Status     HPI     Patients history was provided by his wife.     Mono Varghese is a 46 year old male who has a history of liver failure, brain CA and seizures, and HTN who presents today for evaluation for abdominal pain and an altered mental status. Patients wife notes that the patients abdomen is \"hard, swollen, and painful\" and that his baseline abdomen girth is 108 cm but has been more than that yesterday and it has been concerning. Patients wife notes that they took an ultrasound yesterday at the [facility] and that the doctors did not find anything remarkable. Patient wife reports no fevers but that the patient has been complaining of chills. Upon evaluation, the patient is confused and cannot recall what year it is.     I have reviewed the Medications, Allergies, Past Medical and Surgical History, and Social History in the [employer] system.    Past Medical History:   Diagnosis Date     Brain tumor (H)      Liver failure (H)        Past Surgical History:   Procedure Laterality Date     COLONOSCOPY N/A 11/27/2015    Procedure: COMBINED COLONOSCOPY, SINGLE OR MULTIPLE BIOPSY/POLYPECTOMY BY BIOPSY;  Surgeon: Ran Thurston MD;  Location:  GI     ESOPHAGOSCOPY, GASTROSCOPY, DUODENOSCOPY (EGD), COMBINED N/A 11/23/2015    Procedure: COMBINED ESOPHAGOSCOPY, GASTROSCOPY, DUODENOSCOPY (EGD);  Surgeon: Rocael Rizvi MD;  Location:  OR     ESOPHAGOSCOPY, GASTROSCOPY, DUODENOSCOPY (EGD), COMBINED N/A 1/25/2017    Procedure: COMBINED ESOPHAGOSCOPY, GASTROSCOPY, DUODENOSCOPY (EGD), BIOPSY SINGLE OR MULTIPLE;  Surgeon: Rocael Tejada MD;  Location:  GI     ESOPHAGOSCOPY, GASTROSCOPY, DUODENOSCOPY (EGD), COMBINED N/A 3/30/2017    Procedure: COMBINED ESOPHAGOSCOPY, GASTROSCOPY, DUODENOSCOPY (EGD);  Surgeon: Jordana Ramirez MD;  Location:  GI     OPTICAL TRACKING SYSTEM CRANIOTOMY, EXCISE " TUMOR, COMBINED  6/6/2013    Procedure: COMBINED OPTICAL TRACKING SYSTEM CRANIOTOMY, EXCISE TUMOR;  Left Stealth Guided Craniotomy , Tumor Resection ;  Surgeon: J Carlos Aranda MD;  Location: [facility] OR     ORTHOPEDIC SURGERY      hand surgery- right     SIGMOIDOSCOPY FLEXIBLE N/A 11/24/2015    Procedure: SIGMOIDOSCOPY FLEXIBLE;  Surgeon: Rocael Rizvi MD;  Location: [facility]       Family History   Problem Relation Age of Onset     Unknown/Adopted Mother        Social History   Substance Use Topics     Smoking status: Never Smoker     Smokeless tobacco: Never Used     Alcohol use No      Comment: Quit 01/2014       Current Facility-Administered Medications   Medication     naloxone (NARCAN) injection 0.1-0.4 mg     [START ON 7/30/2017] calcium carb 1250 mg (500 mg [place])/vitamin D 200 units (OSCAL with D) per tablet 1 tablet     cholecalciferol (vitamin D) tablet 1,000 Units     cyanocobalamin (vitamin  B-12) tablet 1,000 mcg     [START ON 7/30/2017] phenytoin (DILANTIN) CR capsule 90 mg     phenytoin (DILANTIN) CR capsule 120 mg     thiamine tablet 100 mg     rifaximin (XIFAXAN) tablet 550 mg     pravastatin (PRAVACHOL) tablet 80 mg     omeprazole (priLOSEC) CR capsule 40 mg     multivitamin, therapeutic with minerals (THERA-VIT-M) tablet 1 tablet     mirtazapine (REMERON) tablet 15 mg     melatonin tablet 5 mg     levothyroxine (SYNTHROID/LEVOTHROID) tablet 88 mcg     lactulose (CHRONULAC) solution 30 mL     hypromellose (GENTEAL) 0.3 % ophthalmic solution 2 drop     hydrOXYzine (ATARAX) tablet 25 mg     ferrous sulfate (IRON) tablet 325 mg     glucose 40 % gel 15-30 g    Or     dextrose 50 % injection 25-50 mL    Or     glucagon injection 1 mg     insulin aspart (NovoLOG) inj (RAPID ACTING)     insulin aspart (NovoLOG) inj (RAPID ACTING)     Daily 2 GRAM acetaminophen limit, unless fulminent liver failure or transaminases greater than or equal to 300 - 400, then none     lactulose (CHRONULAC) solution 20 g    Or      lactulose (CHRONULAC) solution for enema prep 100 g          Allergies   Allergen Reactions     No Clinical Screening - See Comments      Coban and Surgilast cause itching     Tegaderm Transparent Dressing (Informational Only)      Latex Itching and Rash     Review of Systems   Unable to perform ROS: Mental status change   Constitutional: Positive for chills. Negative for fever.   Gastrointestinal: Positive for abdominal pain.   All other systems reviewed and are negative.      Physical Exam   BP: 118/80  Pulse: 72  Temp: 97.8  F (36.6  C)  Resp: 18  Weight: 95.6 kg (210 lb 12.2 oz)  SpO2: 94 %  Physical Exam   Constitutional: No distress.   HENT:   Head: Atraumatic.   Mouth/Throat: Oropharynx is clear and moist. No oropharyngeal exudate.   Eyes: Pupils are equal, round, and reactive to light. No scleral icterus.   Neck: Neck supple. No JVD present.   Cardiovascular: Normal rate, normal heart sounds and intact distal pulses.  Exam reveals no gallop and no friction rub.    No murmur heard.  Pulmonary/Chest: Effort normal and breath sounds normal. No respiratory distress. He has no wheezes. He has no rales. He exhibits no tenderness.   Abdominal: Soft. Bowel sounds are normal. He exhibits distension. He exhibits no mass. There is no tenderness. There is no rebound and no guarding.   Musculoskeletal: He exhibits no edema or tenderness.   Neurological: He is alert.   Not sure time, confused   Skin: Skin is warm. No rash noted. He is not diaphoretic.       ED Course     ED Course     Procedures  Results for orders placed during the hospital encounter of 07/29/17   POC US ABDOMEN LIMITED    Impression Limited Bedside Abdominal Ultrasound, performed and interpreted by me.     Indication: Abdominal swelling. Evaluate for Free fluid.     With the patient in Trendelenburg, the RUQ, LUQ, (including the paracolic gutters) views were examined for free fluid. With the patient in reverse Trendelenburg, the super pubic view was  examined for free fluid.     Findings: Free fluid present in RUQ, LUQ, Pelvis    IMPRESSION: Free fluid present as noted above. Clinically most likely ascites.             Critical Care time:  was 30 minutes for this patient excluding procedures.         Results for orders placed or performed during the hospital encounter of 07/29/17 (from the past 24 hour(s))   POC US ABDOMEN LIMITED     Status: None    Collection Time: 07/29/17 11:47 AM    Impression    Limited Bedside Abdominal Ultrasound, performed and interpreted by me.     Indication: Abdominal swelling. Evaluate for Free fluid.     With the patient in Trendelenburg, the RUQ, LUQ, (including the paracolic gutters) views were examined for free fluid. With the patient in reverse Trendelenburg, the super pubic view was examined for free fluid.     Findings: Free fluid present in RUQ, LUQ, Pelvis    IMPRESSION: Free fluid present as noted above. Clinically most likely ascites.   Comprehensive metabolic panel     Status: Abnormal    Collection Time: 07/29/17 12:01 PM   Result Value Ref Range    Sodium 137 133 - 144 mmol/L    Potassium 3.9 3.4 - 5.3 mmol/L    Chloride 108 94 - 109 mmol/L    Carbon Dioxide 25 20 - 32 mmol/L    Anion Gap 5 3 - 14 mmol/L    Glucose 146 (H) 70 - 99 mg/dL    Urea Nitrogen 6 (L) 7 - 30 mg/dL    Creatinine 0.87 0.66 - 1.25 mg/dL    GFR Estimate >90  Non  GFR Calc   >60 mL/min/1.7m2    GFR Estimate If Black >90   GFR Calc   >60 mL/min/1.7m2    Calcium 8.3 (L) 8.5 - 10.1 mg/dL    Bilirubin Total 1.3 0.2 - 1.3 mg/dL    Albumin 3.1 (L) 3.4 - 5.0 g/dL    Protein Total 6.5 (L) 6.8 - 8.8 g/dL    Alkaline Phosphatase 200 (H) 40 - 150 U/L    ALT 40 0 - 70 U/L    AST 50 (H) 0 - 45 U/L   CBC with platelets differential     Status: Abnormal    Collection Time: 07/29/17 12:01 PM   Result Value Ref Range    WBC 1.7 (L) 4.0 - 11.0 10e9/L    RBC Count 3.95 (L) 4.4 - 5.9 10e12/L    Hemoglobin 12.6 (L) 13.3 - 17.7 g/dL     Hematocrit 37.3 (L) 40.0 - 53.0 %    MCV 94 78 - 100 fl    MCH 31.9 26.5 - 33.0 pg    MCHC 33.8 31.5 - 36.5 g/dL    RDW 15.4 (H) 10.0 - 15.0 %    Platelet Count 42 (LL) 150 - 450 10e9/L    Diff Method Automated Method     % Neutrophils 55.5 %    % Lymphocytes 24.1 %    % Monocytes 9.6 %    % Eosinophils 10.2 %    % Basophils 0.6 %    % Immature Granulocytes 0.0 %    Nucleated RBCs 0 0 /100    Absolute Neutrophil 0.9 (L) 1.6 - 8.3 10e9/L    Absolute Lymphocytes 0.4 (L) 0.8 - 5.3 10e9/L    Absolute Monocytes 0.2 0.0 - 1.3 10e9/L    Absolute Eosinophils 0.2 0.0 - 0.7 10e9/L    Absolute Basophils 0.0 0.0 - 0.2 10e9/L    Abs Immature Granulocytes 0.0 0 - 0.4 10e9/L    Absolute Nucleated RBC 0.0    Ammonia (on ice)     Status: Abnormal    Collection Time: 07/29/17 12:01 PM   Result Value Ref Range    Ammonia 60 (H) 10 - 50 umol/L   INR     Status: Abnormal    Collection Time: 07/29/17 12:01 PM   Result Value Ref Range    INR 1.45 (H) 0.86 - 1.14   Phenytoin level     Status: Abnormal    Collection Time: 07/29/17 12:01 PM   Result Value Ref Range    Phenytoin Level 3.5 (L) 10 - 20 mg/L   US Abdomen Limited w Doppler Complete     Status: None    Collection Time: 07/29/17  1:14 PM    Narrative    EXAMINATION: US ABDOMEN LIMITED WITH DOPPLER COMPLETE, 7/29/2017 1:14  PM     COMPARISON: Ultrasound abdomen dated 7/19/2017    HISTORY: Follow-up portal vein thrombosis    TECHNIQUE: The abdomen was scanned in standard fashion with  specialized ultrasound transducer(s) using both grey scale, color  Doppler, and spectral flow techniques.    Findings:    Liver: The liver shows nodular contour. It measures 18.4 cm in  craniocaudal dimension. The echogenicity is coarsened. No evidence of  focal hepatic mass. Extrahepatic portal vein flow is antegrade,  measuring 24 cm/sec.  Redemonstration of echogenic thrombus in the left portal vein, which  is nonocclusive.  Right portal vein flow is antegrade, measuring 20 cm/sec.    Flow in the  hepatic artery is towards the liver and:  72 cm/sec peak systolic  0.6 resistive index.     The splenic vein is patent and flow is towards the liver.  The left,  middle, and right hepatic veins are patent with flow towards the IVC.  The IVC is patent with flow towards the heart.   The aorta is not  dilated. Periumbilical vein noted.    Gallbladder: The gallbladder is partially distended. No stones are  detected. Mild prominence of the gallbladder wall, likely related to  the presence of ascites and underlying liver disease.    Bile Ducts: Both the intra- and extrahepatic biliary system are of  normal caliber.  The common bile duct measures 3 mm in diameter.    Kidneys: Right kidney is of normal echotexture, without mass nor  hydronephrosis.   The craniocaudal dimensions are: right- 10.6 cm,     Fluid: mild ascites.      Impression    Impression:   1. Cirrhotic appearance of the liver without focal mass.  2. Redemonstration of nonocclusive thrombus in the left portal vein,  unchanged compared with previous ultrasound dated 7/19/2017.  3. Patent main portal vein and hepatic arteries.    I have personally reviewed the examination and initial interpretation  and I agree with the findings.    DAYA SHAW MD   UA reflex to Microscopic and Culture     Status: None    Collection Time: 07/29/17  1:29 PM   Result Value Ref Range    Color Urine Yellow     Appearance Urine Clear     Glucose Urine Negative NEG mg/dL    Bilirubin Urine Negative NEG    Ketones Urine Negative NEG mg/dL    Specific Gravity Urine 1.016 1.003 - 1.035    Blood Urine Negative NEG    pH Urine 5.5 5.0 - 7.0 pH    Protein Albumin Urine Negative NEG mg/dL    Urobilinogen mg/dL Normal 0.0 - 2.0 mg/dL    Nitrite Urine Negative NEG    Leukocyte Esterase Urine Negative NEG    Source Midstream Urine    Glucose by meter     Status: Abnormal    Collection Time: 07/29/17  5:20 PM   Result Value Ref Range    Glucose 180 (H) 70 - 99 mg/dL             Labs  Ordered and Resulted from Time of ED Arrival Up to the Time of Departure from the ED   COMPREHENSIVE METABOLIC PANEL - Abnormal; Notable for the following:        Result Value    Glucose 146 (*)     Urea Nitrogen 6 (*)     Calcium 8.3 (*)     Albumin 3.1 (*)     Protein Total 6.5 (*)     Alkaline Phosphatase 200 (*)     AST 50 (*)     All other components within normal limits   CBC WITH PLATELETS DIFFERENTIAL - Abnormal; Notable for the following:     WBC 1.7 (*)     RBC Count 3.95 (*)     Hemoglobin 12.6 (*)     Hematocrit 37.3 (*)     RDW 15.4 (*)     Platelet Count 42 (*)     Absolute Neutrophil 0.9 (*)     Absolute Lymphocytes 0.4 (*)     All other components within normal limits   AMMONIA - Abnormal; Notable for the following:     Ammonia 60 (*)     All other components within normal limits   INR - Abnormal; Notable for the following:     INR 1.45 (*)     All other components within normal limits   PHENYTOIN LEVEL - Abnormal; Notable for the following:     Phenytoin Level 3.5 (*)     All other components within normal limits   UA MACROSCOPIC WITH REFLEX TO MICRO AND CULTURE            Assessments & Plan (with Medical Decision Making)  Hepatic encephalopathy, not clear what precipitated, still tolerate po-given first dose lactulose, benign abd exam, only mild ascites. D/W Medicine-admit.  Portal vein thrombosis, not worse with today's doppler, D/W GI Fellow-likely chronic, no anticoagulation.       I have reviewed the nursing notes.    I have reviewed the findings, diagnosis, plan and need for follow up with the patient.    Current Discharge Medication List          Final diagnoses:   Hepatic encephalopathy (H)   Portal vein thrombosis   End-stage liver disease (H)   IAlla, am serving as a trained medical scribe to document services personally performed by Kenroy Manzanares MD, based on the provider's statements to me.    I, Kenroy Manzanares MD, was physically present and have reviewed and verified the  accuracy of this note documented by Alla Tomas.   7/29/2017   [facility], [place], EMERGENCY DEPARTMENT     Kenroy Manzanares MD  07/29/17 [id number]

## 2017-07-29 NOTE — ED NOTES
"Arrived to ED d/t c/o abdominal pain and altered mental status, symptoms have been occurring for the past week and worsening, stomach is \"hard, swollen and painful\", hx of liver failure, brain CA and seizures, denies NV, has been having increased stools 4-5 per day that are formed, recently stopped lactulose 2 months ago d/t watery stools, VSS upon arrival in triage  "

## 2017-07-29 NOTE — PROGRESS NOTES
EEG#:     CLINICAL SUMMARY:  The patient is a 46-year-old male with a history of left parietal astrocytoma status post resection.  The patient had episodes of speech arrest lasting up to 10 minutes.  The EEG was performed to evaluate for seizures.  The patient is reportedly on Dilantin and gabapentin.    TECHNICAL SUMMARY:  This EEG monitoring was performed with 23 scalp electrodes in the 10-20 system of placement and additional scalp, precordial and other surface electrodes were used for electrical referencing and artifact detection.     INTERICTAL ACTIVITY:  During quiet wakefulness, there was 7 to 8 hertz activity over the right posterior head region.  Posterior dominant rhythm was slower and higher amplitude over the left hemisphere.  There was increased amplitude and nearly continuous polymorphic theta delta slowing over the left posterior temporoparietal occipital region.  There were also sharply contoured theta delta wave forms in this region.  Diffuse theta activity is also seen over the right hemisphere.  No clear epileptiform discharges were present.  Photic stimulation did not induce remarkable photic driving response.    CLINICAL/ICTAL EVENTS:  No electrographic or clinical seizures were recorded.    IMPRESSION:  This is an abnormal video EEG due to the presence of mild diffuse nonspecific encephalopathy. There is also an asymmetry with increased amplitude over the left posterior temporoparietal occipital region as well as  delta slowing and sharply contoured activity in this region consistent with this structure abnormality corresponding to the patient history of astrocytoma on inspection.  No clear epileptiform discharges were present.  No electrographic or clinical seizures were recorded.  Clinical correlation advised.        Marcelina Milan MD    D:  07/28/2017 16:46 T:  07/28/2017 17:01  Document:  [id number]

## 2017-07-29 NOTE — TELEPHONE ENCOUNTER
"Spouse calling, \"His abdominal girth is not decreasing after the stool softeners.  We went to the [facility] today, sat there for 3 hours, left, went to the [facility] and sat there for 5 hours.  We left without being seen and just got home.  Will the [facility] see patients with his history?\"  Advised her to go back to the [facility], where patient is normally seen.     Kelin Najera RN/FNA        "

## 2017-07-29 NOTE — IP AVS SNAPSHOT
MRN:[id number]                      After Visit Summary   7/29/2017    Mono Varghese    MRN: [id number]           Thank you!     Thank you for choosing [facility] for your care. Our goal is always to provide you with excellent care. Hearing back from our patients is one way we can continue to improve our services. Please take a few minutes to complete the written survey that you may receive in the mail after you visit with us. Thank you!        Patient Information     Date Of Birth          1970        Designated Caregiver       Most Recent Value    Caregiver    Will someone help with your care after discharge? yes    Name of designated caregiver Yisel    Phone number of caregiver [phone]     Caregiver address [address]       About your hospital stay     You were admitted on:  July 29, 2017 You last received care in the:  [address] [facility]    You were discharged on:  August 1, 2017        Reason for your hospital stay       Altered mental status from hepatic encephalopathy                  Who to Call     For medical emergencies, please call 911.  For non-urgent questions about your medical care, please call your primary care provider or clinic, [phone]          Attending Provider     Provider Specialty    Kenroy Manzanares MD Internal Medicine    [facility]       Primary Care Provider Office Phone # Fax #    [place] [phone]      After Care Instructions     Activity       Your activity upon discharge: activity as tolerated            Diet       Follow this diet upon discharge: Orders Placed This Encounter      Combination Diet Low Saturated Fat Na <2400mg Diet, No Caffeine Diet            Discharge Instructions       - Please start new medications simethicone and use lower dose of lactulose. It is okay to skip one dose of lactulose if you have more than 5 bowel movements in one day.  - We have placed  referrals for health psychology and neuropsychiatry testing. You should be called to schedule neuropsychiatry testing. You have to call to schedule an appointment with health psychology, the number is [phone]  - See your primary provider within a week of discharging from the hospital, you should be called to schedule this (you can also call Dr. Avila's office to schedule if that is easier).  - We need to check a dilantin level this week, this is ordered for you and you can go to the lab and get a blood draw on 8/4/17. You should get a call from Dr. English's office regarding the result of this lab (the result will be sent to him).  - Weight yourself every other day. If you are consistently gaining weight over the course of 3 or more days, please contact your primary provider.  - You can skip a lactulose dose if you have more than 5 bowel movements in a 24 hour period            Monitor and record       fluid intake and output daily  Limit intake to 2 liters per day   weight every other day                  Follow-up Appointments     Adult [facility] Follow-up and recommended labs and tests       Follow up with primary care provider, [place], within 7 days for hospital follow- up.  Follow up with oncology as needed, has visit set for next spring  Follow up with Neuropsychology for neuropsych testing evaluation within a month.  Follow up with [facility] within 3 weeks.     Appointments on [place] and/or [facility] (with [facility] or [facility] provider or service). Call [phone] if you haven't heard regarding these appointments within 7 days of discharge.            Follow Up and recommended labs and tests       Phenytoin level in 3-5 days                  Your next 10 appointments already scheduled     Oct 18, 2017  9:00 AM CDT   Lab with  LAB    Health Lab ([facility])    [address]  1st Floor  [facility] [address] [phone]            Oct  18, 2017  9:50 AM CDT   (Arrive by 9:35 AM)   Return General Liver with Beatriz Tanner MD   [facility] ([facility])    [address] [place] [id number]   [phone]            Oct 19, 2017  1:30 PM CDT   (Arrive by 1:15 PM)   Return Seizure with Anil English MD   [facility] ([facility])    [address] [place] [id number]   [phone]            Apr 27, 2018 11:30 AM CDT   (Arrive by 11:15 AM)   MR BRAIN W/O & W CONTRAST with [facility] MRI ([facility])    [address] [place] [address]   [phone]           Take your medicines as usual, unless your doctor tells you not to. Bring a list of your current medicines to your exam (including vitamins, minerals and over-the-counter drugs).  You will be given intravenous contrast for this exam. To prepare:   The day before your exam, drink extra fluids at least six 8-ounce glasses (unless your doctor tells you to restrict your fluids).   Have a blood test (creatinine test) within 30 days of your exam. Go to your clinic or Diagnostic Imaging Department for this test.  The MRI machine uses a strong magnet. Please wear clothes without metal (snaps, zippers). A sweatsuit works well, or we may give you a hospital gown.  Please remove any body piercings and hair extensions before you arrive. You will also remove watches, jewelry, hairpins, wallets, dentures, partial dental plates and hearing aids. You may wear contact lenses, and you may be able to wear your rings. We have a safe place to keep your personal items, but it is safer to leave them at home.   **IMPORTANT** THE INSTRUCTIONS BELOW ARE ONLY FOR THOSE PATIENTS WHO HAVE BEEN TOLD THEY WILL RECEIVE SEDATION OR GENERAL ANESTHESIA DURING THEIR MRI PROCEDURE:  IF YOU WILL RECEIVE SEDATION (take medicine to help you  relax during your exam):   You must get the medicine from your doctor before you arrive. Bring the medicine to the exam. Do not take it at home.   Arrive one hour early. Bring someone who can take you home after the test. Your medicine will make you sleepy. After the exam, you may not drive, take a bus or take a taxi by yourself.   No eating 8 hours before your exam. You may have clear liquids up until 4 hours before your exam. (Clear liquids include water, clear tea, black coffee and fruit juice without pulp.)  IF YOU WILL RECEIVE ANESTHESIA (be asleep for your exam):   Arrive 1 1/2 hours early. Bring someone who can take you home after the test. You may not drive, take a bus or take a taxi by yourself.   No eating 8 hours before your exam. You may have clear liquids up until 4 hours before your exam. (Clear liquids include water, clear tea, black coffee and fruit juice without pulp.)  Please call the Imaging Department at your exam site with any questions.            May 04, 2018 11:00 AM CDT   (Arrive by 10:45 AM)   Return Visit with Lauro Shaw MD   [facility] ([facility])    [address] [place] [address] [phone]              Additional Services     Home Care OT Referral for Hospital Discharge       [facility] [phone]  Fax [phone]    OT to eval and treat    Your provider has ordered home care - occupational therapy. If you have not been contacted within 2 days of your discharge please call the department phone number listed on the top of this document.            Home Care PT Referral for Hospital Discharge       [facility] [phone]  Fax [phone]    PT to eval and treat    Your provider has ordered home care - physical therapy. If you have not been contacted within 2 days of your discharge please call the department phone number listed on the top of this document.            Home Care Social  Service Referral for Hospital Discharge       [facility] [phone]  Fax [phone]     to assist care giver with strategies to deal with behaviors     Your provider has ordered home care - . If you have not been contacted within 2 days of your discharge please call the department phone number listed on the top of this document.            Home care nursing referral       [facility] [phone]  Fax [phone]    RN skilled nursing visit. RN to assess vital signs and weight, respiratory and cardiac status, pain level and activity tolerance, hydration, nutrition and bowel status and home safety.  RN to teach medication management.    Your provider has ordered home care nursing services. If you have not been contacted within 2 days of your discharge please call the inpatient department phone number at [phone] .            Medication Therapy Management Referral       Reason for referral:  on more than 10 medications and hospitalized or in the ED in the past 6 months     This service is designed to help you get the most from your medications.  A specially trained pharmacist will work closely with you and your doctors  to solve any problems related to your medications and to help you get the   best results from taking them.      The Medication Therapy Management staff will call you to schedule an appointment.            NEUROPSYCHOLOGY REFERRAL       Your provider has referred you to:    PREFERRED PROVIDERS:    All scheduling is subject to the client's specific insurance plan & benefits, provider/location availability, and provider clinical specialities.  Please arrive 15 minutes early for your first appointment and bring your completed paperwork.    Please be aware that coverage of these services is subject to the terms and limitations of your health insurance plan.  Call member services at your health plan with any benefit or coverage questions.    Please  bring the following to your appointment:  >>   Any x-rays, CTs or MRIs which have been performed.  Contact the facility where they were done to arrange for  prior to your scheduled appointment.  Any new CT, MRI or other procedures ordered by your specialist must be performed at a [facility] facility or coordinated by your clinic's referral office.    >>   List of current medications   >>   This referral request   >>   Any documents/labs given to you for this referral            [facility] Referral       Your provider has referred you to:  PREFERRED PROVIDERS:    Please be aware that coverage of these services is subject to the terms and limitations of your health insurance plan.  Call member services at your health plan with any benefit or coverage questions.      Please bring the following to your appointment:    >>   Any x-rays, CTs or MRIs which have been performed.  Contact the facility where they were done to arrange for  prior to your scheduled appointment.   >>   List of current medications   >>   This referral request   >>   Any documents/labs given to you for this referral                  Future tests that were ordered for you     Phenytoin level                 Further instructions from your care team       - Please start new medications simethicone and use lower dose of lactulose. It is okay to skip one dose of lactulose if you have more than 5 bowel movements in one day.  - We have placed referrals for health psychology and neuropsychiatry testing. You should be called to schedule neuropsychiatry testing. You have to call to schedule an appointment with Health Psychology, the number is [phone]  - Please see your primary provider within a week of discharging from the hospital, you should be called to schedule this (you can also call Dr. Avila's office to schedule if that is easier).  - We need to check a dilantin level this week, this is ordered for you and you can go to  the lab and get a blood draw on 8/4/17. You should get a call from Dr. English's office regarding the result of this lab (the result will be sent to him).  - Weight yourself every other day. If you are consistently gaining weight over the course of 3 or more days, please contact your primary provider.        Pending Results     Date and Time Order Name Status Description    7/30/2017 0657 Blood culture Preliminary     7/29/2017 1807 Blood culture Preliminary     7/29/2017 1807 Blood culture Preliminary             Statement of Approval     Ordered          08/01/17 1336  I have reviewed and agree with all the recommendations and orders detailed in this document.  EFFECTIVE NOW     Approved and electronically signed by:  Patrick Mcclellan MD             Admission Information     Date & Time Provider Department Dept. Phone    7/29/2017 Toni Patiño MD [address] [facility] [phone]      Your Vitals Were     Blood Pressure Pulse Temperature Respirations Weight Pulse Oximetry    125/73 (BP Location: Right arm) 70 97  F (36.1  C) (Oral) 16 95.7 kg (211 lb) 93%    BMI (Body Mass Index)                   30.28 kg/m2           MyChart Information     [facility] [employer] gives you secure access to your electronic health record. If you see a primary care provider, you can also send messages to your care team and make appointments. If you have questions, please call your primary care clinic.  If you do not have a primary care provider, please call [phone] and they will assist you.        Care EveryWhere ID     This is your Care EveryWhere ID. This could be used by other organizations to access your [facility] medical records  [id number]        Equal Access to Services     SAMUEL FIAR : Hadii kevin Thomas, olesya martinez, qapetrona kajosue gregory. So [facility] [phone].    ATENCIÓN: Si habla español, tiene a chiang disposición servicios gratuitos de asistencia  lingüísticmikeBonnie Billings al [phone].    We comply with applicable federal civil rights laws and [place] laws. We do not discriminate on the basis of race, color, national origin, age, disability sex, sexual orientation or gender identity.               Review of your medicines      START taking        Dose / Directions    simethicone 80 MG chewable tablet   Commonly known as:  MYLICON   Used for:  Flatulence, eructation and gas pain        Dose:  80 mg   Take 1 tablet (80 mg) by mouth every 6 hours as needed for cramping   Quantity:  180 tablet   Refills:  1         CONTINUE these medicines which may have CHANGED, or have new prescriptions. If we are uncertain of the size of tablets/capsules you have at home, strength may be listed as something that might have changed.        Dose / Directions    Calcium Carb-Cholecalciferol 500-400 MG-UNIT Tabs   Commonly known as:  calcium 500 +D   This may have changed:  when to take this   Used for:  Malnutrition (H)        Dose:  500 mg   Take 500 mg by mouth daily   Quantity:  90 tablet   Refills:  1       lactulose 10 GM/15ML solution   Commonly known as:  CHRONULAC   This may have changed:    - medication strength  - how much to take  - when to take this   Used for:  Hepatic encephalopathy (H)        Dose:  5 g   Take 7.5 mLs (5 g) by mouth 2 times daily   Quantity:  473 mL   Refills:  1       phenytoin 30 MG CR capsule   Commonly known as:  DILANTIN   This may have changed:    - how much to take  - how to take this  - when to take this  - additional instructions  - Another medication with the same name was removed. Continue taking this medication, and follow the directions you see here.   Used for:  Oligoastrocytoma of parietal lobe (H)        Dose:  120 mg   Take 4 capsules (120 mg) by mouth 2 times daily   Quantity:  240 capsule   Refills:  1       sulfamethoxazole-trimethoprim 800-160 MG per tablet   Commonly known as:  BACTRIM DS/SEPTRA DS   This may have changed:   when to take this   Used for:  Spontaneous bacterial peritonitis (H)        Dose:  1 tablet   Take 1 tablet by mouth 2 times daily   Quantity:  60 tablet   Refills:  5         CONTINUE these medicines which have NOT CHANGED        Dose / Directions    blood glucose monitoring test strip   Commonly known as:  ONE TOUCH ULTRA   Used for:  Diabetes mellitus, type 2 (H)        Use to test blood sugars 4 times daily as needed or as directed.   Quantity:  300 each   Refills:  4       cholecalciferol 1000 UNITS capsule   Commonly known as:  vitamin  -D   Used for:  Malnutrition (H)        Dose:  1 capsule   Take 1 capsule (1,000 Units) by mouth daily   Quantity:  90 capsule   Refills:  1       cyanocobalamin 1000 MCG Tabs   Used for:  Alcoholic cirrhosis of liver with ascites (H)        Dose:  1000 mcg   1,000 mcg by Per G Tube route daily   Quantity:  30 tablet   Refills:  0       dulaglutide 0.75 MG/0.5ML pen   Commonly known as:  TRULICITY   Used for:  DM type 2, goal HbA1c 7%-8% (H)        Dose:  0.75 mg   Inject 0.75 mg Subcutaneous every 7 days   Quantity:  2 mL   Refills:  3       ferrous sulfate 325 (65 FE) MG tablet   Commonly known as:  IRON   Used for:  Other iron deficiency anemia        Dose:  1 tablet   Take 1 tablet (325 mg) by mouth 2 times daily   Quantity:  200 tablet   Refills:  3       gabapentin 100 MG capsule   Commonly known as:  NEURONTIN   Used for:  Mild episode of recurrent major depressive disorder (H)        Dose:  100 mg   Take 1 capsule (100 mg) by mouth At Bedtime   Quantity:  30 capsule   Refills:  3       glipiZIDE 10 MG 24 hr tablet   Commonly known as:  GLUCOTROL XL   Used for:  DM type 2, goal HbA1c 7%-8% (H)        Dose:  10 mg   Take 1 tablet (10 mg) by mouth daily   Quantity:  90 tablet   Refills:  1       HYDROcodone-acetaminophen 5-325 MG per tablet   Commonly known as:  NORCO   Used for:  Brain tumor (H)        Dose:  2 tablet   Take 2 tablets by mouth every 6 hours as needed for  moderate to severe pain   Quantity:  60 tablet   Refills:  0       hydrOXYzine 25 MG tablet   Commonly known as:  ATARAX   Used for:  Itching, Anxiety        Dose:  25 mg   Take 1 tablet (25 mg) by mouth 2 times daily   Quantity:  180 tablet   Refills:  1       hypromellose 0.4 % Soln ophthalmic solution   Commonly known as:  ARTIFICIAL TEARS   Used for:  Presbyopia        Dose:  2 drop   Place 2 drops into both eyes every 8 hours   Quantity:  1 Bottle   Refills:  11       levothyroxine 88 MCG tablet   Commonly known as:  SYNTHROID/LEVOTHROID   Used for:  Hypothyroidism        Dose:  88 mcg   Take 1 tablet (88 mcg) by mouth daily   Quantity:  90 tablet   Refills:  3       melatonin 5 MG tablet        Dose:  5 mg   Take 5 mg by mouth nightly as needed for sleep   Refills:  0       mirtazapine 15 MG tablet   Commonly known as:  REMERON   Used for:  Moderate episode of recurrent major depressive disorder (H)        Take 1 tablet (15 mg) at bedtime.   Quantity:  30 tablet   Refills:  0       multivitamin, therapeutic with minerals Tabs tablet        Dose:  1 tablet   Take 1 tablet by mouth 2 times daily   Refills:  0       omeprazole 20 MG CR capsule   Commonly known as:  priLOSEC   Used for:  Gastroesophageal reflux disease without esophagitis        Dose:  40 mg   Take 2 capsules (40 mg) by mouth 2 times daily   Quantity:  120 capsule   Refills:  11       ONE TOUCH LANCETS Misc   Used for:  Type II or unspecified type diabetes mellitus without mention of complication, not stated as uncontrolled        by In Vitro route 4 times daily as needed   Quantity:  100 each   Refills:  prn       pravastatin 80 MG tablet   Commonly known as:  PRAVACHOL   Used for:  High cholesterol        Dose:  80 mg   Take 1 tablet (80 mg) by mouth daily   Quantity:  90 tablet   Refills:  1       rifaximin 550 MG Tabs tablet   Commonly known as:  XIFAXAN   Used for:  Hepatic encephalopathy (H)        Dose:  550 mg   Take 1 tablet (550 mg) by  mouth 2 times daily   Quantity:  60 tablet   Refills:  11       thiamine 100 MG tablet   Used for:  Alcoholic cirrhosis of liver with ascites (H)        Dose:  100 mg   Take 1 tablet (100 mg) by mouth daily   Quantity:  100 tablet   Refills:  1       traZODone 50 MG tablet   Commonly known as:  DESYREL   Used for:  Primary insomnia        Dose:  75 mg   Take 1.5 tablets (75 mg) by mouth nightly as needed for sleep   Quantity:  45 tablet   Refills:  3         STOP taking     DILANTIN PO                Where to get your medicines      Some of these will need a paper prescription and others can be bought over the counter. Ask your nurse if you have questions.     Bring a paper prescription for each of these medications     lactulose 10 GM/15ML solution    phenytoin 30 MG CR capsule    simethicone 80 MG chewable tablet                Protect others around you: Learn how to safely use, store and throw away your medicines at www.disposemymeds.org.             Medication List: This is a list of all your medications and when to take them. Check marks below indicate your daily home schedule. Keep this list as a reference.      Medications           Morning Afternoon Evening Bedtime As Needed    blood glucose monitoring test strip   Commonly known as:  ONE TOUCH ULTRA   Use to test blood sugars 4 times daily as needed or as directed.                                Calcium Carb-Cholecalciferol 500-400 MG-UNIT Tabs   Commonly known as:  calcium 500 +D   Take 500 mg by mouth daily                                cholecalciferol 1000 UNITS capsule   Commonly known as:  vitamin  -D   Take 1 capsule (1,000 Units) by mouth daily                                cyanocobalamin 1000 MCG Tabs   1,000 mcg by Per G Tube route daily   Last time this was given:  1,000 mcg on 8/1/2017  9:15 AM                                dulaglutide 0.75 MG/0.5ML pen   Commonly known as:  TRULICITY   Inject 0.75 mg Subcutaneous every 7 days                                 ferrous sulfate 325 (65 FE) MG tablet   Commonly known as:  IRON   Take 1 tablet (325 mg) by mouth 2 times daily   Last time this was given:  325 mg on 8/1/2017  9:16 AM                                gabapentin 100 MG capsule   Commonly known as:  NEURONTIN   Take 1 capsule (100 mg) by mouth At Bedtime                                glipiZIDE 10 MG 24 hr tablet   Commonly known as:  GLUCOTROL XL   Take 1 tablet (10 mg) by mouth daily   Last time this was given:  10 mg on 8/1/2017  9:16 AM                                HYDROcodone-acetaminophen 5-325 MG per tablet   Commonly known as:  NORCO   Take 2 tablets by mouth every 6 hours as needed for moderate to severe pain                                hydrOXYzine 25 MG tablet   Commonly known as:  ATARAX   Take 1 tablet (25 mg) by mouth 2 times daily   Last time this was given:  25 mg on 8/1/2017  9:16 AM                                hypromellose 0.4 % Soln ophthalmic solution   Commonly known as:  ARTIFICIAL TEARS   Place 2 drops into both eyes every 8 hours                                lactulose 10 GM/15ML solution   Commonly known as:  CHRONULAC   Take 7.5 mLs (5 g) by mouth 2 times daily   Last time this was given:  5 g on 8/1/2017  9:16 AM                                levothyroxine 88 MCG tablet   Commonly known as:  SYNTHROID/LEVOTHROID   Take 1 tablet (88 mcg) by mouth daily   Last time this was given:  88 mcg on 8/1/2017  9:16 AM                                melatonin 5 MG tablet   Take 5 mg by mouth nightly as needed for sleep   Last time this was given:  5 mg on 7/30/2017 10:16 PM                                mirtazapine 15 MG tablet   Commonly known as:  REMERON   Take 1 tablet (15 mg) at bedtime.   Last time this was given:  15 mg on 7/31/2017 10:11 PM                                multivitamin, therapeutic with minerals Tabs tablet   Take 1 tablet by mouth 2 times daily   Last time this was given:  1 tablet on 8/1/2017  9:16 AM                                 omeprazole 20 MG CR capsule   Commonly known as:  priLOSEC   Take 2 capsules (40 mg) by mouth 2 times daily   Last time this was given:  40 mg on 8/1/2017  9:16 AM                                ONE TOUCH LANCETS Misc   by In Vitro route 4 times daily as needed                                phenytoin 30 MG CR capsule   Commonly known as:  DILANTIN   Take 4 capsules (120 mg) by mouth 2 times daily   Last time this was given:  120 mg on 8/1/2017  9:17 AM                                pravastatin 80 MG tablet   Commonly known as:  PRAVACHOL   Take 1 tablet (80 mg) by mouth daily   Last time this was given:  80 mg on 8/1/2017  9:16 AM                                rifaximin 550 MG Tabs tablet   Commonly known as:  XIFAXAN   Take 1 tablet (550 mg) by mouth 2 times daily   Last time this was given:  550 mg on 8/1/2017  9:16 AM                                simethicone 80 MG chewable tablet   Commonly known as:  MYLICON   Take 1 tablet (80 mg) by mouth every 6 hours as needed for cramping   Last time this was given:  80 mg on 8/1/2017 12:08 PM                                sulfamethoxazole-trimethoprim 800-160 MG per tablet   Commonly known as:  BACTRIM DS/SEPTRA DS   Take 1 tablet by mouth 2 times daily                                thiamine 100 MG tablet   Take 1 tablet (100 mg) by mouth daily   Last time this was given:  100 mg on 8/1/2017  9:15 AM                                traZODone 50 MG tablet   Commonly known as:  DESYREL   Take 1.5 tablets (75 mg) by mouth nightly as needed for sleep

## 2017-07-29 NOTE — PROGRESS NOTES
Social Work: Assessment with Discharge Plan    Patient Name:  Mono Madera  :  1970  Age:  46 year old  MRN:  [id number]  Risk/Complexity Score:     Completed assessment with:  Pt, Wife    Presenting Information   Reason for Referral:  Discharge plan  Date of Intake:  2017  Referral Source:  Chart Review  Decision Maker:  Pt  Alternate Decision Maker:  Pt's wife  Health Care Directive:  Copy in Chart  Living Situation:  House; Pt lives with his wife and a small dog.  Previous Functional Status:  Assistance with Other:  Pt requires 24-hour supervision due to memory and cognitive deficits.  Patient and family understanding of hospitalization:  Pt presented to the ED with abdominal pain and altered mental status.  Adjustment to Illness:  Pt is resting comfortably and has a sense of humor. Pt's wife is at bedside and attentive to Pt's needs.    Physical Health  Reason for Admission:    1. Hepatic encephalopathy (H)    2. Portal vein thrombosis    3. End-stage liver disease (H)      Services Needed/Recommended:  Other:  Pending further medical workup.    Support System  Significant relationship at present time:  Wife  Family of origin is available for support:  Yes, parents and sisters with some support  Other support available:  Pt's mother-in-law is very supportive.  Gaps in support system:  None noted.  Patient is caregiver to:  None     Provider Information   Primary Care Physician:  King Louis   [phone]   [facility]:  [facility] [address] [place] [address] / [facility]*      :  Jihan Greer, Nurse Coordinator with Oncology, [phone]    Financial   Income Source:  SSDI, Wife's 401k  Financial Concerns:  Limited income, but no current concerns  Insurance:    Payor/Plan Subscriber Name Rel Member # Group #   MEDICARE - MEDICARE MONO MADERA  [id number]       ATTN CLAIMS, [address]   [place] MONO MADERA  [id number]       [address] [address], [id number]       Discharge Plan   Patient and family discharge goal:  Regain baseline function, return home  Provided education on discharge plan:  YES  Patient agreeable to discharge plan:  Pending recommendations  A list of Medicare Certified Facilities was provided to the patient and/or family to encourage patient choice. Patient's choices for facility are:  [facility] or [facility] provide Skilled rehabilitation or complex medical:  YES  General information regarding anticipated insurance coverage and possible out of pocket cost was discussed. Patient and patient's family are aware patient may incur the cost of transportation to the facility, pending insurance payment: Pt's wife familiar from previous experiences.  Barriers to discharge:  Medical clearance    Discharge Recommendations   Anticipated Disposition:  pending further workup.  Transportation Needs:  Family:  Wife normally transports  Name of Transportation Company and Phone:  N/A    Additional comments   Pt is a 47 y/o male who presented to the ED with abdominal pain and altered mental status. Pt has a history of liver failure, brain cancer, seizures, and HTN. Pt has poor memory; his wife is at bedside to provide information and assistance. Pt normally requires 24-hour supervision at home. He lives with his wife and a small dog, Brandon, in a house. Pt's mother-in-law, a nurse practitioner,  provides care when wife is sick or has appointments. Pt's parents live in [place], [place]. Two sisters live in the [facility] and one sister is in [place]. Pt was in the [employer], but does not qualify for VA benefits. He has a CADI waiver, under which his wife is paid to be his PCA.     Per wife, Pt's memory is very poor and he routinely requires repeated verbal cues to eat and perform other ADL's. He has a sense of humor and often jokes about his symptoms/situation, and may need to be encouraged to \"be serious\". Wife also states that his temper is " sometimes unpredictable and he may become irritable. Pt's wife participates in a weekly oncology support group and has recently joined a liver support group. Pt and wife feel he would benefit from PT/OT eval while admitted as he has had balance issues. He has a cane and shower chair at home. He has stayed at both [facility] in [place] and [facility] after hospitalizations in the last 2 years. Wife is agreeable to either facility if TCU is recommended. Writer will endorse to unit SW for follow-up.      Mela Chu, [facility]  Emergency Department   Pager: [phone]

## 2017-07-29 NOTE — H&P
"[facility] [place]    History and Physical  {Insert patient care service :[id number]}     Date of Admission:  7/29/2017  Date of Service: 07/29/17        Resident Documentation:    History of Present Illness   Mono Varghese is a 46 year old male who presents with    Hepatic encephalopathy (H)  Portal vein thrombosis  End-stage liver disease (H). ***    Physical Exam   Temp: 97.6  F (36.4  C) Temp src: Oral BP: 135/90 Pulse: 68   Resp: 16 SpO2: 97 % O2 Device: None (Room air)    Vital Signs with Ranges  Temp:  [96.8  F (36  C)-97.8  F (36.6  C)] 97.6  F (36.4  C)  Pulse:  [68-92] 68  Resp:  [16-20] 16  BP: (112-135)/(79-94) 135/90  SpO2:  [91 %-100 %] 97 %  214 lbs 0 oz    {Choose blank or pick list and edit:722229}    I have reviewed the Past Medical, Family and Social Histories and the Review of Systems. Please see the Student Note below for details.    I personally reviewed {Choose images/EKG's you personally looked at today:817736::\"no images or EKG's today\"}.    Assessment & Plan   Mono Varghese is a 46 year old male who was admitted on 7/29/2017. ***    Code Status: DNR/DNI per Wife Yisel  Disposition: Expected discharge in *** days once ***.    {Resident Signature:651513}      Student Note     Primary Care Provider: King Louis    Chief Complaint: Abdominal pain/swelling x3-4 days, altered mental status 7-10 days.      History obtained from Mono and his wife Yisel.    History of Present Illness  Mono Varghese is a 46 year old male with a PMH liver failure, ruptured esophageal varices, seizures, brain cancer s/p craniotomy, and HTN who presents with abdominal swelling and colicky pain that comes and goes for the past 3-4 days with increased confusion over past 7-10 days.  Liver failure was diagnosed two summers ago in 2015 and 4 months later he had esophageal varices rupture.  The abdominal circumference has increased from 99 to 108 over one night in the past " week and has been persistent at 108 for the past week.  Weight has also increased from 205 to 222lbs over the past week.  The patient denies recent infection (no diarrhea, no URI symptoms) but notably has had a non-productive cough and increased hiccups which are similar to November'16 when he had esophageal variceal rupture.  Hepatitis labs are negative, no prior history of IV drug abuse and mild alcohol abuse in the past (2 12packs/week, 7-8 years).  Mono also has a baseline confusion (forgetful of things that happened 1-2 weeks prior) for the past 4 years after a coma related to seizures.  His confusion has progressed over the past 7-10 days to forgetting things that happened 10min - and hour prior.  The wife also notes that Mono has said inappropriate things to strangers in public over the past week.  An EEG from y/d 7/28 showed diffuse encephalopathy without a focal/single lesions/defect that could explain recent increase in altered mental status.  The patient also notes myalgias have increased in the past 3-4 days causing one fall, and energy has decreased as well.         Past Medical History  1. Brain tumor s/p brain surgery 4 years ago.  They couldn't remove the entire tumor  2. Liver failure diagnosed 2 years ago  3. Type 2 diabetes  4. Baseline confusion (see hpi) s/p coma that occurred 4 years ago  5. ADHD  6. Presbyopia  7. Prior GI Bleed  8. Hypothyroidism diagnosed 1 year ago  9.  Obstructive sleep apnea  10.  Pancytopenia  11. Seizures    Past Surgical History  Brain tumor  Hand surgery in teenage years  Social History  Mono lives with his wife Yisel and dog kelly.  He eats a regular diet and only uses salt with potatoes.  He hasn't had alcohol since liver failure diagnosis two summers ago 2015.  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  Mono drank 12beers/night, ~2times/week for 7-8 years.  No prior history of IV drug use.      Family History  Mono  "is adopted and the only family history known is that his father  of liver failure secondary to alcohol abuse.      Immunization History  Most Recent Immunizations   Administered Date(s) Administered     Influenza (IIV3) 10/13/2016     Pneumococcal 23 valent 2012     TDAP Vaccine (Boostrix) 10/14/2014     Immunization Status:  {:5306::\"up to date and documented\"}    Prior to Admission Medications  Prior to Admission Medications   Prescriptions Last Dose Informant Patient Reported? Taking?   Calcium Carb-Cholecalciferol (CALCIUM 500 +D) 500-400 MG-UNIT TABS  Self No No   Sig: Take 500 mg by mouth daily   Patient taking differently: Take 500 mg by mouth every morning    DILANTIN 30 MG CR capsule   No No   Sig: Take 3 caps ( 90 mg ) each AM and 4 caps (120 mg ) each evening   HYDROcodone-acetaminophen (NORCO) 5-325 MG per tablet   No No   Sig: Take 2 tablets by mouth every 6 hours as needed for moderate to severe pain   ONE TOUCH LANCETS MISC   No No   Sig: by In Vitro route 4 times daily as needed   Phenytoin (DILANTIN PO)   Yes No   Sig: Take 30 mg by mouth every morning    blood glucose monitoring (ONE TOUCH ULTRA) test strip   No No   Sig: Use to test blood sugars 4 times daily as needed or as directed.   cholecalciferol (VITAMIN  -D) 1000 UNITS capsule   No No   Sig: Take 1 capsule (1,000 Units) by mouth daily   cyanocobalamin 1000 MCG TABS   No No   Si,000 mcg by Per G Tube route daily   dulaglutide (TRULICITY) 0.75 MG/0.5ML pen   No No   Sig: Inject 0.75 mg Subcutaneous every 7 days   ferrous sulfate (IRON) 325 (65 FE) MG tablet   No No   Sig: Take 1 tablet (325 mg) by mouth 2 times daily   gabapentin (NEURONTIN) 100 MG capsule   No No   Sig: Take 1 capsule (100 mg) by mouth At Bedtime   glipiZIDE (GLUCOTROL XL) 10 MG 24 hr tablet   No No   Sig: Take 1 tablet (10 mg) by mouth daily   hydrOXYzine (ATARAX) 25 MG tablet   No No   Sig: Take 1 tablet (25 mg) by mouth 2 times daily   hypromellose " (ARTIFICIAL TEARS) 0.4 % SOLN   No No   Sig: Place 2 drops into both eyes every 8 hours   lactulose 20 GM/30ML SOLN   No No   Sig: Take 30 mLs by mouth every morning   Patient taking differently: Take 30 mLs by mouth every other day    levothyroxine (SYNTHROID/LEVOTHROID) 88 MCG tablet   No No   Sig: Take 1 tablet (88 mcg) by mouth daily   melatonin 5 MG tablet  Self Yes No   Sig: Take 5 mg by mouth nightly as needed for sleep   mirtazapine (REMERON) 15 MG tablet   No No   Sig: Take 1 tablet (15 mg) at bedtime.   multivitamin, therapeutic with minerals (THERA-VIT-M) TABS  Self Yes No   Sig: Take 1 tablet by mouth 2 times daily   omeprazole (PRILOSEC) 20 MG CR capsule   No No   Sig: Take 2 capsules (40 mg) by mouth 2 times daily   phenytoin (DILANTIN) 100 MG CR capsule   No No   Sig: Take 7 tabs two times today or 12 hrs apart   phenytoin (DILANTIN) 30 MG CR capsule   No No   Si cap twice a day   phenytoin (DILANTIN) 30 MG CR capsule   No No   Sig: Take 1 cap each AM and 2 cap each PM   pravastatin (PRAVACHOL) 80 MG tablet   No No   Sig: Take 1 tablet (80 mg) by mouth daily   rifaximin (XIFAXAN) 550 MG TABS tablet   No No   Sig: Take 1 tablet (550 mg) by mouth 2 times daily   sulfamethoxazole-trimethoprim (BACTRIM DS,SEPTRA DS) 800-160 MG per tablet   No No   Sig: Take 1 tablet by mouth 2 times daily   Patient taking differently: Take 1 tablet by mouth every morning    thiamine 100 MG tablet   No No   Sig: Take 1 tablet (100 mg) by mouth daily   traZODone (DESYREL) 50 MG tablet   No No   Sig: Take 1.5 tablets (75 mg) by mouth nightly as needed for sleep      Facility-Administered Medications: None       Allergies  Allergies   Allergen Reactions     No Clinical Screening - See Comments      Coban and Surgilast cause itching     Tegaderm Transparent Dressing (Informational Only)      Latex Itching and Rash       Review of Systems  CONST: Chills (past week), weight gain (205-222 past week)  HEENT: Decreased vision  past 2 weeks 3 days after getting a new pair of prescription glasses  RESP: Mild SOB when going up stairs in house, Non-productive cough, denies chest pain or hemoptysis  CARD: Denies orthopnea, PND, DOW.  Recent swelling ankles/feet, orthostatic (dizzy but doesn't lose consciousness sometimes when sitting up)  GI: 4-5 loose stools a day, colicky abdominal pain  : Denies urgency, dysuria  Endo: Possible increased thirst  MSK: Myalgias have increased in the past week or two.  Fell on the ground 3-4 days ago.    HEME: Denies bruising/bleeding  Neuro: Denies headache, but has increased weakness over past week  Psych: History of depression.  Dozes of during the day for past week and hard to wake up.  Decreased energy levels.  Recently has been blurting out inappropriate things to random people and talking more with strangers he doesn't know.      Physical Examination  Temp: 97.6  F (36.4  C) Temp src: Oral BP: 135/90 Pulse: 68   Resp: 16 SpO2: 97 % O2 Device: None (Room air)    Vital Signs with Ranges  Temp:  [96.8  F (36  C)-97.8  F (36.6  C)] 97.6  F (36.4  C)  Pulse:  [68-92] 68  Resp:  [16-20] 16  BP: (112-135)/(79-94) 135/90  SpO2:  [91 %-100 %] 97 %  214 lbs 0 oz    System Based Physical Exam:  Constitutional: Appears well, stable, good mood sitting in bed eating lunch.  AOX2/3, didn't know what year it was but knew it was summer.  Eyes: PERRL, EOMI  HEENT: CNs intake  Respiratory: Clear lung fields, no accessory m. use  Cardiovascular: Possible S1 split/S4, RRR  GI: Diffuse abdominal tenderness, normal bowel sounds, distended abdomen  Skin: No visible telangiectasias or palmar erythema or jaundice  Neurologic:   Psychiatric: Patient has good sense of humor, but confused.      Data     Recent Labs  Lab 07/29/17  1201   WBC 1.7*   HGB 12.6*   MCV 94   PLT 42*   INR 1.45*      POTASSIUM 3.9   CHLORIDE 108   CO2 25   BUN 6*   CR 0.87   ANIONGAP 5   UCHE 8.3*   *   ALBUMIN 3.1*   PROTTOTAL 6.5*    BILITOTAL 1.3   ALKPHOS 200*   ALT 40   AST 50*       Recent Results (from the past 24 hour(s))   POC US ABDOMEN LIMITED    Impression    Limited Bedside Abdominal Ultrasound, performed and interpreted by me.     Indication: Abdominal swelling. Evaluate for Free fluid.     With the patient in Trendelenburg, the RUQ, LUQ, (including the paracolic gutters) views were examined for free fluid. With the patient in reverse Trendelenburg, the super pubic view was examined for free fluid.     Findings: Free fluid present in RUQ, LUQ, Pelvis    IMPRESSION: Free fluid present as noted above. Clinically most likely ascites.   US Abdomen Limited w Doppler Complete    Narrative    EXAMINATION: US ABDOMEN LIMITED WITH DOPPLER COMPLETE, 7/29/2017 1:14  PM     COMPARISON: Ultrasound abdomen dated 7/19/2017    HISTORY: Follow-up portal vein thrombosis    TECHNIQUE: The abdomen was scanned in standard fashion with  specialized ultrasound transducer(s) using both grey scale, color  Doppler, and spectral flow techniques.    Findings:    Liver: The liver shows nodular contour. It measures 18.4 cm in  craniocaudal dimension. The echogenicity is coarsened. No evidence of  focal hepatic mass. Extrahepatic portal vein flow is antegrade,  measuring 24 cm/sec.  Redemonstration of echogenic thrombus in the left portal vein, which  is nonocclusive.  Right portal vein flow is antegrade, measuring 20 cm/sec.    Flow in the hepatic artery is towards the liver and:  72 cm/sec peak systolic  0.6 resistive index.     The splenic vein is patent and flow is towards the liver.  The left,  middle, and right hepatic veins are patent with flow towards the IVC.  The IVC is patent with flow towards the heart.   The aorta is not  dilated. Periumbilical vein noted.    Gallbladder: The gallbladder is partially distended. No stones are  detected. Mild prominence of the gallbladder wall, likely related to  the presence of ascites and underlying liver  disease.    Bile Ducts: Both the intra- and extrahepatic biliary system are of  normal caliber.  The common bile duct measures 3 mm in diameter.    Kidneys: Right kidney is of normal echotexture, without mass nor  hydronephrosis.   The craniocaudal dimensions are: right- 10.6 cm,     Fluid: mild ascites.      Impression    Impression:   1. Cirrhotic appearance of the liver without focal mass.  2. Redemonstration of nonocclusive thrombus in the left portal vein,  unchanged compared with previous ultrasound dated 7/19/2017.  3. Patent main portal vein and hepatic arteries.    I have personally reviewed the examination and initial interpretation  and I agree with the findings.    DAYA SHAW MD       Student Assessment and Plan:  Mono is a 47 y/o male with a PMH ruptured esophageal liver failure, rupt varices, seizures, brain cancer s/p craniotomy, and HTN who presents with abdominal swelling and colicky pain that comes and goes for the past 3-4 days with increased confusion over past 7-10 days.      #Abdominal Pain/Swelling   Negative scleral icterus, jaundice, elevated Tbili, and minimal ascites on ultrasound.  Mono has a hx of liver failure and ruptured esophageal varices, no prior IV drug use, negative hepatitis labs and moderate alcohol use.  He hasn't taken lactulose for 2 months but is taking rifaxamin.  He had non-occlusive portal venous thrombus with trace ascites found on 7/19 which was seen again today.  Hbg stable at 12.6 and ammonia elevated to 60umol/L.  Not convincing the abdominal swelling/pain is due to liver failure with u/s showing minimal ascites.  Other etiologies include partial obstruction/holding onto gas in the setting of colicky abdominal pain.     -Diagnostic paracentesis (if possible) w/ cell diff, count, gram stain, culture  -Lactulose qd  -monitor electrolytes  -monitor cbc    #Encephalopathy  HE vs. Subdural hemorrhage vs. Hypothyroidism vs. Brain tumor vs. Psychiatric  medications.  In the setting of elevated INR 1.45, decreased vision and increased confusion/decreased  Memory x1wk get further imaging to r/o subdural hemorrhage.  Could also be 2/2 liver failure, however decompensated liver failure lower on list (see above discussion).  Patient also hypothyroidism which could contribute to AMS.  Patient also has a brain tumor that was not fully resected years ago which could be contributing to current state despite negative headaches.  Also could be 2/2 to psychiatric medications; phenytoin was found to be subtherapeutic and despite y/d EEG showing no evidence for ongoing seizure, he could still be having seizures or be post-ictal from a seizure that wasn't caught a week or two ago.    -Neuro and psych consult for seizure and psychiatric drug dosing.  -OT for MOCA     #Sepsis Workup in Stable Patient  Patient is nonfebrile, and has been stable since admission. R/o sepsis in the setting of abdominal pain and altered mental status of unknown origin, with possible infection source.  -blood and urine culture  -chest xray    #Type 2 Diabetes  -D/c home diabetes insulin regimen, and oral meds  -Start medium sliding scale  -Monitor glucose q6hrs    PPX: Mechanical      Disposition: Expected discharge once we figure out whats going.    Kevon Hdz

## 2017-07-30 ENCOUNTER — MEDICAL CORRESPONDENCE (OUTPATIENT)
Dept: HEALTH INFORMATION MANAGEMENT | Facility: CLINIC | Age: 47
End: 2017-07-30

## 2017-07-30 LAB
ALBUMIN UR-MCNC: NEGATIVE MG/DL
ANION GAP SERPL CALCULATED.3IONS-SCNC: 8 MMOL/L (ref 3–14)
APPEARANCE UR: CLEAR
BILIRUB UR QL STRIP: NEGATIVE
BUN SERPL-MCNC: 8 MG/DL (ref 7–30)
CALCIUM SERPL-MCNC: 8.6 MG/DL (ref 8.5–10.1)
CHLORIDE SERPL-SCNC: 109 MMOL/L (ref 94–109)
CO2 SERPL-SCNC: 25 MMOL/L (ref 20–32)
COLOR UR AUTO: NORMAL
CREAT SERPL-MCNC: 0.91 MG/DL (ref 0.66–1.25)
ERYTHROCYTE [DISTWIDTH] IN BLOOD BY AUTOMATED COUNT: 15.6 % (ref 10–15)
GFR SERPL CREATININE-BSD FRML MDRD: 89 ML/MIN/1.7M2
GLUCOSE BLDC GLUCOMTR-MCNC: 152 MG/DL (ref 70–99)
GLUCOSE BLDC GLUCOMTR-MCNC: 184 MG/DL (ref 70–99)
GLUCOSE BLDC GLUCOMTR-MCNC: 264 MG/DL (ref 70–99)
GLUCOSE BLDC GLUCOMTR-MCNC: 351 MG/DL (ref 70–99)
GLUCOSE BLDC GLUCOMTR-MCNC: 99 MG/DL (ref 70–99)
GLUCOSE SERPL-MCNC: 91 MG/DL (ref 70–99)
GLUCOSE UR STRIP-MCNC: NEGATIVE MG/DL
HCT VFR BLD AUTO: 36.1 % (ref 40–53)
HGB BLD-MCNC: 12.2 G/DL (ref 13.3–17.7)
HGB BLD-MCNC: 12.4 G/DL (ref 13.3–17.7)
HGB UR QL STRIP: NEGATIVE
KETONES UR STRIP-MCNC: NEGATIVE MG/DL
LEUKOCYTE ESTERASE UR QL STRIP: NEGATIVE
MCH RBC QN AUTO: 32.2 PG (ref 26.5–33)
MCHC RBC AUTO-ENTMCNC: 34.3 G/DL (ref 31.5–36.5)
MCV RBC AUTO: 94 FL (ref 78–100)
NITRATE UR QL: NEGATIVE
PH UR STRIP: 6 PH (ref 5–7)
PHENYTOIN FREE SERPL-MCNC: ABNORMAL UG/ML
PHENYTOIN SERPL-MCNC: 4 MG/L (ref 10–20)
PLATELET # BLD AUTO: 36 10E9/L (ref 150–450)
POTASSIUM SERPL-SCNC: 3.8 MMOL/L (ref 3.4–5.3)
RBC # BLD AUTO: 3.85 10E12/L (ref 4.4–5.9)
RBC #/AREA URNS AUTO: <1 /HPF (ref 0–2)
SODIUM SERPL-SCNC: 141 MMOL/L (ref 133–144)
SP GR UR STRIP: 1 (ref 1–1.03)
TSH SERPL DL<=0.05 MIU/L-ACNC: 13.23 MU/L (ref 0.4–4)
URN SPEC COLLECT METH UR: NORMAL
UROBILINOGEN UR STRIP-MCNC: NORMAL MG/DL (ref 0–2)
WBC # BLD AUTO: 1.6 10E9/L (ref 4–11)
WBC #/AREA URNS AUTO: 0 /HPF (ref 0–2)

## 2017-07-30 PROCEDURE — 85018 HEMOGLOBIN: CPT | Performed by: STUDENT IN AN ORGANIZED HEALTH CARE EDUCATION/TRAINING PROGRAM

## 2017-07-30 PROCEDURE — 36415 COLL VENOUS BLD VENIPUNCTURE: CPT | Performed by: STUDENT IN AN ORGANIZED HEALTH CARE EDUCATION/TRAINING PROGRAM

## 2017-07-30 PROCEDURE — A9270 NON-COVERED ITEM OR SERVICE: HCPCS | Mod: GY | Performed by: STUDENT IN AN ORGANIZED HEALTH CARE EDUCATION/TRAINING PROGRAM

## 2017-07-30 PROCEDURE — 80048 BASIC METABOLIC PNL TOTAL CA: CPT | Performed by: STUDENT IN AN ORGANIZED HEALTH CARE EDUCATION/TRAINING PROGRAM

## 2017-07-30 PROCEDURE — 25000132 ZZH RX MED GY IP 250 OP 250 PS 637: Mod: GY | Performed by: STUDENT IN AN ORGANIZED HEALTH CARE EDUCATION/TRAINING PROGRAM

## 2017-07-30 PROCEDURE — 80185 ASSAY OF PHENYTOIN TOTAL: CPT | Performed by: STUDENT IN AN ORGANIZED HEALTH CARE EDUCATION/TRAINING PROGRAM

## 2017-07-30 PROCEDURE — 87086 URINE CULTURE/COLONY COUNT: CPT | Performed by: STUDENT IN AN ORGANIZED HEALTH CARE EDUCATION/TRAINING PROGRAM

## 2017-07-30 PROCEDURE — 00000146 ZZHCL STATISTIC GLUCOSE BY METER IP

## 2017-07-30 PROCEDURE — 84443 ASSAY THYROID STIM HORMONE: CPT | Performed by: STUDENT IN AN ORGANIZED HEALTH CARE EDUCATION/TRAINING PROGRAM

## 2017-07-30 PROCEDURE — 80186 ASSAY OF PHENYTOIN FREE: CPT | Performed by: STUDENT IN AN ORGANIZED HEALTH CARE EDUCATION/TRAINING PROGRAM

## 2017-07-30 PROCEDURE — 12000001 ZZH R&B MED SURG/OB UMMC

## 2017-07-30 PROCEDURE — 99232 SBSQ HOSP IP/OBS MODERATE 35: CPT | Mod: GC | Performed by: HOSPITALIST

## 2017-07-30 PROCEDURE — 87040 BLOOD CULTURE FOR BACTERIA: CPT | Performed by: STUDENT IN AN ORGANIZED HEALTH CARE EDUCATION/TRAINING PROGRAM

## 2017-07-30 PROCEDURE — 85027 COMPLETE CBC AUTOMATED: CPT | Performed by: STUDENT IN AN ORGANIZED HEALTH CARE EDUCATION/TRAINING PROGRAM

## 2017-07-30 PROCEDURE — 81001 URINALYSIS AUTO W/SCOPE: CPT | Performed by: STUDENT IN AN ORGANIZED HEALTH CARE EDUCATION/TRAINING PROGRAM

## 2017-07-30 RX ORDER — LACTULOSE 10 G/15ML
5 SOLUTION ORAL 2 TIMES DAILY
Status: DISCONTINUED | OUTPATIENT
Start: 2017-07-30 | End: 2017-08-01 | Stop reason: HOSPADM

## 2017-07-30 RX ADMIN — INSULIN ASPART 1 UNITS: 100 INJECTION, SOLUTION INTRAVENOUS; SUBCUTANEOUS at 08:28

## 2017-07-30 RX ADMIN — CYANOCOBALAMIN TAB 1000 MCG 1000 MCG: 1000 TAB at 08:24

## 2017-07-30 RX ADMIN — RIFAXIMIN 550 MG: 550 TABLET ORAL at 08:39

## 2017-07-30 RX ADMIN — EXTENDED PHENYTOIN SODIUM 120 MG: 30 CAPSULE ORAL at 19:55

## 2017-07-30 RX ADMIN — PRAVASTATIN SODIUM 80 MG: 20 TABLET ORAL at 08:25

## 2017-07-30 RX ADMIN — FERROUS SULFATE TAB 325 MG (65 MG ELEMENTAL FE) 325 MG: 325 (65 FE) TAB at 08:25

## 2017-07-30 RX ADMIN — VITAMIN D, TAB 1000IU (100/BT) 1000 UNITS: 25 TAB at 08:25

## 2017-07-30 RX ADMIN — INSULIN ASPART 3 UNITS: 100 INJECTION, SOLUTION INTRAVENOUS; SUBCUTANEOUS at 17:26

## 2017-07-30 RX ADMIN — Medication 5 MG: at 22:16

## 2017-07-30 RX ADMIN — RIFAXIMIN 550 MG: 550 TABLET ORAL at 19:52

## 2017-07-30 RX ADMIN — OMEPRAZOLE 40 MG: 20 CAPSULE, DELAYED RELEASE ORAL at 19:52

## 2017-07-30 RX ADMIN — HYDROXYZINE HYDROCHLORIDE 25 MG: 25 TABLET ORAL at 19:52

## 2017-07-30 RX ADMIN — FERROUS SULFATE TAB 325 MG (65 MG ELEMENTAL FE) 325 MG: 325 (65 FE) TAB at 19:52

## 2017-07-30 RX ADMIN — MIRTAZAPINE 15 MG: 15 TABLET, FILM COATED ORAL at 22:16

## 2017-07-30 RX ADMIN — LACTULOSE 5 G: 20 SOLUTION ORAL at 17:27

## 2017-07-30 RX ADMIN — EXTENDED PHENYTOIN SODIUM 30 MG: 30 CAPSULE ORAL at 13:00

## 2017-07-30 RX ADMIN — MULTIPLE VITAMINS W/ MINERALS TAB 1 TABLET: TAB at 08:24

## 2017-07-30 RX ADMIN — MULTIPLE VITAMINS W/ MINERALS TAB 1 TABLET: TAB at 19:52

## 2017-07-30 RX ADMIN — LEVOTHYROXINE SODIUM 88 MCG: 88 TABLET ORAL at 08:25

## 2017-07-30 RX ADMIN — EXTENDED PHENYTOIN SODIUM 90 MG: 30 CAPSULE ORAL at 08:26

## 2017-07-30 RX ADMIN — Medication 100 MG: at 08:39

## 2017-07-30 RX ADMIN — INSULIN ASPART 5 UNITS: 100 INJECTION, SOLUTION INTRAVENOUS; SUBCUTANEOUS at 12:59

## 2017-07-30 RX ADMIN — HYDROXYZINE HYDROCHLORIDE 25 MG: 25 TABLET ORAL at 08:25

## 2017-07-30 RX ADMIN — OMEPRAZOLE 40 MG: 20 CAPSULE, DELAYED RELEASE ORAL at 08:24

## 2017-07-30 RX ADMIN — OYSTER SHELL CALCIUM WITH VITAMIN D 1 TABLET: 500; 200 TABLET, FILM COATED ORAL at 08:25

## 2017-07-30 NOTE — PROVIDER NOTIFICATION
Paged Piedad Cross-cover asking if we can leave port accessed for emergent use d/t pt refusing PIV.

## 2017-07-30 NOTE — PLAN OF CARE
Problem: Individualization  Goal: Patient Preferences     Patient alert and oriented x 3 (could not tell me the month and date) and is forgetful. Appetite fair. He just had a bowel movement but flushed so unable to see. Vital signs are stable. Glucose 351 and sliding scale insulin was given. His wife verbalized concerns to the doctors that his abdominal girth is increased by 2 cm and he is unsteady on his feet. He denies pain. Refer to doc flow sheets, notes and MAR for other specifics. Continue nursing cares per care plan. Notify doctors of changes/concerns.

## 2017-07-30 NOTE — CONSULTS
[facility]  Neurology Consultation    Patient Name:  Mono Varghese  MRN:  [id number]    :  1970  Date of Service:  2017  Primary care provider:  King oLuis      Neurology consultation service was asked to see Mono Varghese by Dr. Damico to evaluate encephalopathy.    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 Neurology is consulted for drug level management, recommendation for vEEG, and assessment of new behavioral abnormalities. The patient states that he is currently feeling well, and is able to say that he is in the hospital for something to do with abdominal pain, and that he has pieces of time out of the last few weeks that he cannot recall, though he is not able to give an example of something in the past few weeks that he does remember. He does state that his wife manages his medications and to his knowledge is reliable to provide them to him. Beyond this, he is unable to provide further history.     Currently he denies headache, dizziness, nausea, vomiting, vision changes, numbness, weakness, or tingling, imbalance, difficulty walking, or abnormal movements.    Per chart review, he has been on a phenytoin 90 mg qAM and 120 mg qHS daily and most recently, drug levels have been total 1.4, 2.6, and 3.5; free undetectable (same draw as 1.4 total). In 2016, his free level was therapeutic at 1.05 with a total level of 10.8. He also underwent short term vEEG monitoring for which the read was resulted today describing mild diffuse encephalopathy with asymmetry with increased amplitude over L post/temp/par/occ region, and no epileptiform discharges.    ROS  A 10-point ROS was performed  as per HPI.      PMH  Past Medical History:   Diagnosis Date     Brain tumor (H)      Liver failure (H)      Past Surgical History:   Procedure Laterality Date     COLONOSCOPY N/A 11/27/2015    Procedure: COMBINED COLONOSCOPY, SINGLE OR MULTIPLE BIOPSY/POLYPECTOMY BY BIOPSY;  Surgeon: Ran Thurston MD;  Location:  GI     ESOPHAGOSCOPY, GASTROSCOPY, DUODENOSCOPY (EGD), COMBINED N/A 11/23/2015    Procedure: COMBINED ESOPHAGOSCOPY, GASTROSCOPY, DUODENOSCOPY (EGD);  Surgeon: Rocael Rizvi MD;  Location:  OR     ESOPHAGOSCOPY, GASTROSCOPY, DUODENOSCOPY (EGD), COMBINED N/A 1/25/2017    Procedure: COMBINED ESOPHAGOSCOPY, GASTROSCOPY, DUODENOSCOPY (EGD), BIOPSY SINGLE OR MULTIPLE;  Surgeon: Rocael Tejada MD;  Location:  GI     ESOPHAGOSCOPY, GASTROSCOPY, DUODENOSCOPY (EGD), COMBINED N/A 3/30/2017    Procedure: COMBINED ESOPHAGOSCOPY, GASTROSCOPY, DUODENOSCOPY (EGD);  Surgeon: Jordana Ramirez MD;  Location:  GI     OPTICAL TRACKING SYSTEM CRANIOTOMY, EXCISE TUMOR, COMBINED  6/6/2013    Procedure: COMBINED OPTICAL TRACKING SYSTEM CRANIOTOMY, EXCISE TUMOR;  Left Stealth Guided Craniotomy , Tumor Resection ;  Surgeon: J Carlos Aranda MD;  Location:  OR     ORTHOPEDIC SURGERY      hand surgery- right     SIGMOIDOSCOPY FLEXIBLE N/A 11/24/2015    Procedure: SIGMOIDOSCOPY FLEXIBLE;  Surgeon: Rocael Rizvi MD;  Location:  GI     Medications   Prescriptions Prior to Admission   Medication Sig Dispense Refill Last Dose     cyanocobalamin 1000 MCG TABS 1,000 mcg by Per G Tube route daily 30 tablet 0 7/28/2017 at Unknown time     cholecalciferol (VITAMIN  -D) 1000 UNITS capsule Take 1 capsule (1,000 Units) by mouth daily 90 capsule 1 7/28/2017 at Unknown time     DILANTIN 30 MG CR capsule Take 3 caps ( 90 mg ) each AM and 4 caps (120 mg ) each evening 210 capsule 1 7/28/2017 at Unknown time     dulaglutide (TRULICITY) 0.75 MG/0.5ML pen Inject 0.75 mg Subcutaneous every 7 days 2 mL 3 Past  Week at Unknown time     gabapentin (NEURONTIN) 100 MG capsule Take 1 capsule (100 mg) by mouth At Bedtime 30 capsule 3 7/28/2017 at Unknown time     traZODone (DESYREL) 50 MG tablet Take 1.5 tablets (75 mg) by mouth nightly as needed for sleep 45 tablet 3 7/27/2017 at Unknown time     mirtazapine (REMERON) 15 MG tablet Take 1 tablet (15 mg) at bedtime. 30 tablet 0 7/28/2017 at Unknown time     HYDROcodone-acetaminophen (NORCO) 5-325 MG per tablet Take 2 tablets by mouth every 6 hours as needed for moderate to severe pain 60 tablet 0 More than a month at Unknown time     phenytoin (DILANTIN) 30 MG CR capsule 1 cap twice a day 60 capsule 11 7/28/2017 at Unknown time     phenytoin (DILANTIN) 100 MG CR capsule Take 7 tabs two times today or 12 hrs apart 14 capsule 0 7/28/2017 at Unknown time     phenytoin (DILANTIN) 30 MG CR capsule Take 1 cap each AM and 2 cap each PM 90 capsule 11 7/28/2017 at Unknown time     thiamine 100 MG tablet Take 1 tablet (100 mg) by mouth daily 100 tablet 1 7/28/2017 at Unknown time     glipiZIDE (GLUCOTROL XL) 10 MG 24 hr tablet Take 1 tablet (10 mg) by mouth daily 90 tablet 1 7/28/2017 at Unknown time     pravastatin (PRAVACHOL) 80 MG tablet Take 1 tablet (80 mg) by mouth daily 90 tablet 1 7/28/2017 at Unknown time     hydrOXYzine (ATARAX) 25 MG tablet Take 1 tablet (25 mg) by mouth 2 times daily 180 tablet 1 7/27/2017 at Unknown time     rifaximin (XIFAXAN) 550 MG TABS tablet Take 1 tablet (550 mg) by mouth 2 times daily 60 tablet 11 7/28/2017 at Unknown time     blood glucose monitoring (ONE TOUCH ULTRA) test strip Use to test blood sugars 4 times daily as needed or as directed. 300 each 4 7/28/2017 at Unknown time     levothyroxine (SYNTHROID/LEVOTHROID) 88 MCG tablet Take 1 tablet (88 mcg) by mouth daily 90 tablet 3 7/28/2017 at Unknown time     omeprazole (PRILOSEC) 20 MG CR capsule Take 2 capsules (40 mg) by mouth 2 times daily 120 capsule 11 7/28/2017 at Unknown time     ferrous  sulfate (IRON) 325 (65 FE) MG tablet Take 1 tablet (325 mg) by mouth 2 times daily 200 tablet 3 7/28/2017 at Unknown time     lactulose 20 GM/30ML SOLN Take 30 mLs by mouth every morning (Patient taking differently: Take 30 mLs by mouth every other day ) 946 mL 11 Past Month at Unknown time     sulfamethoxazole-trimethoprim (BACTRIM DS,SEPTRA DS) 800-160 MG per tablet Take 1 tablet by mouth 2 times daily (Patient taking differently: Take 1 tablet by mouth every morning ) 60 tablet 5 7/28/2017 at Unknown time     Phenytoin (DILANTIN PO) Take 30 mg by mouth every morning    7/28/2017 at Unknown time     Calcium Carb-Cholecalciferol (CALCIUM 500 +D) 500-400 MG-UNIT TABS Take 500 mg by mouth daily (Patient taking differently: Take 500 mg by mouth every morning ) 90 tablet 1 7/28/2017 at Unknown time     hypromellose (ARTIFICIAL TEARS) 0.4 % SOLN Place 2 drops into both eyes every 8 hours 1 Bottle 11 Past Week at Unknown time     multivitamin, therapeutic with minerals (THERA-VIT-M) TABS Take 1 tablet by mouth 2 times daily   7/27/2017 at Unknown time     melatonin 5 MG tablet Take 5 mg by mouth nightly as needed for sleep   7/27/2017 at Unknown time     ONE TOUCH LANCETS MISC by In Vitro route 4 times daily as needed 100 each prn 7/28/2017 at Unknown time     Allergies  Allergies   Allergen Reactions     No Clinical Screening - See Comments      Coban and Surgilast cause itching     Tegaderm Transparent Dressing (Informational Only)      Latex Itching and Rash     Social History  I have reviewed this patient's social history    Family History    This patient has no significant family history    Physical Examination   Vitals: /81 (BP Location: Left arm)  Pulse 74  Temp 96.8  F (36  C) (Oral)  Resp 16  Wt 97.1 kg (214 lb)  SpO2 99%  BMI 30.71 kg/m2  General: Adult, in NAD, cooperative  HEENT: NC/AT, no icterus, op pink and moist  Cardiac: RRR no M  Chest: CTAB no w/c/r  Abdomen: S/NT/ND  Extremities: No LE  "swelling.  Skin: No rash or lesion.   Psych: Mood pleasant, affect congruent  Neuro:  Mental status: Awake, alert, intermittently attentive, oriented to self, \"middle of Aug 2017\" but not circumstance of hospitalization. Speech is fluent, no dysarthria. Cannot perform serial 7s or spell \"world\" backward  Cranial nerves: CN2-12 tested and no significant findings appreciated. Eyes conjugate, PERRLA, EOMI, face symmetric, facial sensation intact, shoulder shrug strong, tongue/uvula midline.   Motor: Tone within normal. 5/5 strength in all 4 extremities. No atrophy or twitches. RUE tremor, no asterixis.  Reflexes: Normoreflexic and symmetric biceps, patellar, and achilles. Toes down-going.  Sensory: Intact to LT, PP  Coordination: FNF no dysmetria  Gait: Not tested    Investigations   EEG: This is an abnormal video EEG due to the presence of mild diffuse nonspecific encephalopathy.  There is also an additional structural abnormality.  There is also an asymmetry with increased amplitude over the left posterior temporoparietal occipital region as well as remotely delta slowing and sharply contoured activity in this region consistent with this structure abnormality corresponding to the patient history of astrocytoma on inspection.  No clear epileptiform discharges were present.  No electrographic or clinical seizures were recorded.  Clinical correlation advised.    Impression  #encephalopathy- etiology likely multifactorial, however it is clear that the sequelae from his liver disease are not currently optimized. It does not appear from the chart or other available history that he has had a recent episode suggestive of seizure, therefore until other etiologies are ruled out, EEG monitoring is not indicated urgently. It can be considered in the coming days. In the mean time, knowing that he is subtherapeutic for antiepileptic drug as well, we recommend continuing PTA phenytoin dosing with free and total levels in the am, to " determine current state and allow future correlation.    In terms of new behavioral issues, we will wait until the day shift to attempt to collect further collateral information from his wife.    Recommendations  -no EEG overnight  -continue phenytoin 90 mg qAM and 120 mg qHS  -free and total levels with am labs    Thank you for involving Neurology in the care of Mono Varghese.  Please do not hesitate to call with questions/concerns (consult pager [phone]).      Patient was discussed with Dr. Reynoso.    Anna Briscoe MD  Neurology PGY-2  [phone]

## 2017-07-30 NOTE — PLAN OF CARE
Assumed cares at 2300. A/o x 2- Pt oriented to self and able to ate that he is in the hospital. Stated it was July for the month and unable to give year.  Up with SBA. BM x 2. UA/UC sent. Denies pain. 2000 ml FR and no Caffeine diet- pt aware and has been following. BG 99 during night. Strict I & O. Seizure precautions in place. Bed alarm on for pt safety. Scheduled lactulose- BM Goal 4-5 per day. Neuro consult in place. P: Cont to monitor mental status and cont with POC.    Pt wife is concerned about pt developing a cough and hiccups- per pt wife, in past this has correlated with esoph varices. Wife would like this addressed by team this AM.     During night writer did not witness any coughing or hiccups. Call light within reach. Will continue to monitor.

## 2017-07-30 NOTE — PLAN OF CARE
Problem: Goal Outcome Summary  Goal: Goal Outcome Summary  /81 (BP Location: Left arm)  Pulse 74  Temp 96.8  F (36  C) (Oral)  Resp 16  Wt 97.1 kg (214 lb)  SpO2 99%  BMI 30.71 kg/m2  Pt alert and oriented to self and place only.  Disoriented to time and situation.  Denies pain at this time.  Bed alarm on for safety.  Commode at bedside but pt prefers to use bathroom.  SBA.  Cooperative with cares and redirection.  Uses call light 50% of time, other times is forgetful and gets out of bed.  Head CT, xrays, labs completed.  Has had 8 BMs today; received lactulose in ED PTA.  Tolerating diet, no caffeine.  Port accessed and saline locked.  Cont with POC.

## 2017-07-30 NOTE — PROGRESS NOTES
[facility], [place]    Medicine Progress Note    Date of Service (when Attending saw the patient): 07/30/2017    Interval History   Headache for 4 hours last night on left forehead, stabbing in nature.  Patient appears well this morning sitting up in bed AOX2/3 (not oriented to year/month), but is responsive to questions.  Has had one stool as of 10AM.  Denies headache, decreased vision.    Assessment & Plan   Medical Student Note Resident Note   Assessment and Plan (Student)    Student Assessment and Plan:  Mono is a 45 y/o male with a PMH ruptured esophageal liver failure, rupt varices, seizures, brain cancer s/p partial resection who presents with abdominal distension/colicky pain 3-4 days with increased confusion over past 7-10 days possibly 2/2 to partially resected astrocytoma in left parietal lobe in the setting of possible hepatic encephalopathy (although minor, if present) with negative abdominal, chest and head imaging studies.       #Abdominal Pain/Swelling/Liver failure  Negative scleral icterus, jaundice, elevated Tbili, and minimal ascites on ultrasound.  Mono has a hx of liver failure and ruptured esophageal varices, no prior IV drug use, negative hepatitis labs and moderate alcohol use.  He hasn't taken lactulose for 2 months but is taking rifaxamin.  He had non-occlusive portal venous thrombus with trace ascites found on 7/19 which was seen again today.  Hbg stable at 12.6 and ammonia elevated to 60umol/L.  Not convincing the abdominal swelling/pain is due to liver failure with u/s showing minimal ascites.  Other etiologies include partial obstruction/holding onto gas in the setting of colicky abdominal pain.      -Diagnostic paracentesis (if possible) w/ cell diff, count, gram stain, culture.  Ascites was very low the past 2 U/S.  -Lactulose qd (decreased to .25 dose do to ~7 bowel movements y/d).    -continue rifaximin PO 550mg tab  -monitor electrolytes in setting  of lactulose tx.  -monitor cbc  -continue levothyroxin 88mg qD  -naloxone IV for pain control q2min PRN  -calcium carb 1250mg/vit. D 200units per 1 tabet PO qAM  -Vit D tabet 1000 units PO  -thiamine tablet 100mg PO qd  -multivitamin qbid  -cyanocobalamin 1000mg tab qd  -iron tab 325mg qbid       #Encephalopathy  HE vs. Subdural hemorrhage vs. Hypothyroidism vs. Brain tumor vs. Psychiatric medications.  Head CT negative for acute pathology.  Total phenytoin levels are subtherapeutic with low free phenytoin.  TSH is also elevated  13.23.  Neurologist also suggested that any hepatic encephalopathy, even a small amount in the setting of a partially resected astrocytoma could be the cause for AMS in Mono.       -Increase dilantin (extended phenytoin) to 120mg qbid from  90 mg qAM and 120 mg qHS per neuro recs.  -OT for MOCA      #Sepsis Workup in Stable Patient  Sepsis workup, cxray, blood/urine cultures negative so far.  Patient is stable so not worried about sepsis/infection as source of AMS.       #Type 2 Diabetes  -D/c home diabetes insulin regimen, and oral meds  -Start medium sliding scale  -Monitor glucose q6hrs  -pravastatin 90mg qD PO     PPX: Mechanical        Disposition: Expected discharge once we figure out whats going. Assessment and Plan (Resident)    Mr. Varghese is a 46 year old male with a history of cirrhosis, left parietal astrocytoma s/p partial resection, chemo, and radiation complicated by seizures who presents with 1.5 weeks increasing confusion and 4 days  abdominal pain and increased abdominal girth.     #AMS likely 2/2 to hepatic encephalopathy in a known cirrhosis  Dx with cirrhosis in 2015 and left parietal astrocytoma in 2012 s/p partial resection, chemoradiation; seizures on phenytoin with subclinical levels. EEG read on 7/28 with diffuse encephalopathy but no focal seizure activity. Abd U/S showed minimal ascites and stable partially occluding portal venous thrombus. Ammonia level was  elevated. Pt currently being treated for hepatic encephalopathy in the presence of known cirrhosis and cc of abdominal pain and distension. But less likely with pt alert, oriented, and awake with no asterixis. Although per Neurology this pt is prone AMS with small insult given his previous hx of astrocytoma. CT head negative for acute intracranial hemorrhage and or ischemic stroke. Negative sepsis work-up  - BCx and UCx NGT  - Neurology on consult  - Continue PTA rifaximin  - lactulose 5 g BID-->refused recommended dose of 20 g TID  - OT for MOCA  - monitor daily BMP  - hold trazodone and gabapentin to avoid sedation effect       #Seizures: chronic and on phenytoin at home. Previous treated with phenytoin. This admission free and total phenytoin levels are subtherapeutic.  - increase phenytoin to 120 mg BID  - monitor serum phenytoin level Q3days    - PTA phenytoin is      #hypothyrodism: elevated TSH 12.28  - continue PTA levothyroxine    # Pancytopenia   - continue PTA multivitamin, thiamine, iron, cyanocobalamin,     #Type 2 Diabetes  -on home glipizide, pravastatin  -SSI  - continue PTA pravastatin 90mg qD PO     # Depression  -continue mirtazapine     Consults: Neurology      FEN: low Na diet, PRN lyte replacement  Prophy/Misc:    - VTE: ambulation, SCDs:   - Bowels: none  Lines: PIV     Code status: DNR/DNI  Disposition: pending clinical improvement      Patient seen and discussed with Dr. Patiño, who agrees with the above assessment and plan.     Yolis Cabral   PGY-1 IM  6330    Physical Exam (Student)  /81 (BP Location: Left arm)  Pulse 57  Temp 98.2  F (36.8  C) (Oral)  Resp 16  Wt 97.1 kg (214 lb)  SpO2 90%  BMI 30.71 kg/m2    Constitutional: Appears well, stable, good mood sitting in bed eating lunch.  AOX2/3, didn't know what year it was but knew it was summer.  Eyes: PERRL, EOMI  HEENT: CNs intact  Respiratory: Clear lung fields, no accessory m. use  Cardiovascular: normal S1/S2,  RRR  GI: distended abdomen, RUQ tenderness to deep palpatoin, normal bowel sounds   Skin: No visible telangiectasias or palmar erythema or jaundice  Neurologic: CNs intact, sensory intact in extremities.  Normal reflexes, normal strength bilaterally.    Psychiatric: Patient has good sense of humor, but same baseline confusion since admission.     Data Interpretation  I have reviewed today's vital signs, medications, labs and imaging which are significant for altered mental status of unknown origin with distended abdomen with very little ascites.      Physical Exam (Resident)  Constitutional: Appears well, stable, good mood sitting in bed eating lunch.  AOX2/3, didn't know what year it was but knew it was summer.  Eyes: PERRL, EOMI  HEENT: CNs intact  Respiratory: Clear lung fields, no accessory m. use  Cardiovascular: normal S1/S2, RRR  GI: distended abdomen, RUQ tenderness to deep palpatoin, normal bowel sounds   Skin: No visible telangiectasias or palmar erythema or jaundice  Neurologic: CNs intact, sensory intact in extremities.  Normal reflexes, normal strength bilaterally.    Psychiatric: Patient has good sense of humor, but same baseline confusion since admission.    Data Interpretation  I have reviewed today's vital signs, medications, labs and imaging which are significant for:    I personally reviewed no images or EKG's today.    {MEDICAL STUDENT SIGNATURE: Kevon Hdz, MS3 Yolis GONSALVES 7/30/2017 4:29 PM     Medications     - MEDICATION INSTRUCTIONS -         [START ON 7/31/2017] phenytoin  120 mg Oral QAM     lactulose  5 g Oral BID     calcium carb 1250 mg (500 mg [place])/vitamin D 200 units  1 tablet Oral QAM     cholecalciferol  1,000 Units Oral Daily     cyanocobalamin  1,000 mcg Per G Tube Daily     phenytoin  120 mg Oral QPM     thiamine  100 mg Oral Daily     rifaximin  550 mg Oral BID     pravastatin  80 mg Oral Daily     omeprazole  40 mg Oral BID     multivitamin, therapeutic with  minerals  1 tablet Oral BID     mirtazapine  15 mg Oral At Bedtime     levothyroxine  88 mcg Oral Daily     hydrOXYzine  25 mg Oral BID     ferrous sulfate  325 mg Oral BID     insulin aspart  1-7 Units Subcutaneous TID AC     insulin aspart  1-5 Units Subcutaneous At Bedtime     hypromellose-dextran  2 drop Both Eyes Q8H       Data     Recent Labs  Lab 07/30/17  0549 07/29/17  1201   WBC 1.6* 1.7*   HGB 12.4* 12.6*   MCV 94 94   PLT 36* 42*   INR  --  1.45*    137   POTASSIUM 3.8 3.9   CHLORIDE 109 108   CO2 25 25   BUN 8 6*   CR 0.91 0.87   ANIONGAP 8 5   UCHE 8.6 8.3*   GLC 91 146*   ALBUMIN  --  3.1*   PROTTOTAL  --  6.5*   BILITOTAL  --  1.3   ALKPHOS  --  200*   ALT  --  40   AST  --  50*       Recent Results (from the past 24 hour(s))   CT Head w/o Contrast    Narrative    CT HEAD W/O CONTRAST 7/29/2017 8:01 PM    Provided History: subdural hematoma r/o    Comparison: MR 3/1/2017.    Technique: Using multidetector thin collimation helical acquisition  technique, axial, coronal and sagittal CT images from the skull base  to the vertex were obtained without intravenous contrast.     Findings:    Postoperative changes of left parietal craniotomy with large resection  cavity involving the left parietal lobe, with surrounding gliosis.  There is associated ex vacuo dilatation of the left lateral ventricle.  No intracranial hemorrhage, mass effect, or midline shift. The  ventricles are proportionate to the cerebral sulci. The gray to white  matter differentiation of the cerebral hemispheres is preserved. The  basal cisterns are patent. Mild calcifications of the left vertebral  artery.    The visualized paranasal sinuses are clear. The mastoid air cells are  clear.       Impression    Impression:   1. No acute intracranial pathology.  2. Stable postoperative changes of left sided craniotomy and left  parietal lobe resection cavity with surrounding gliosis.    I have personally reviewed the examination and  initial interpretation  and I agree with the findings.    SALO YU MD   XR Chest 2 Views    Narrative    Exam: XR CHEST 2 VW  7/29/2017 8:49 PM      History: infection r/o    Comparison: Radiograph 7/6/2017    Findings: Right-sided Port-A-Cath tip projects over the mid right  atrium. Cardiac silhouette is within normal limits. Trachea is  midline. Pulmonary vasculature is unremarkable. There is no pleural  effusion. No pneumothorax. No acute airspace disease. Visualized  abdomen is normal.      Impression    Impression: No acute airspace disease.    I have personally reviewed the examination and initial interpretation  and I agree with the findings.    ELIAS RABAGO MD   XR Abdomen 2 Views    Narrative    Exam: XR ABDOMEN 2 VW  7/29/2017 8:49 PM      History: concern for free air/gas    Comparison: CT 5/4/2016    Findings: Central catheter tip projects over the mid low right atrium.  Lung bases are clear. There is no free air. Visualized bowel is  nondistended. There is no pneumatosis. No portal venous gas.      Impression    Impression: No free air. Nonobstructive bowel gas pattern.    I have personally reviewed the examination and initial interpretation  and I agree with the findings.    ELIAS RABAGO MD

## 2017-07-31 ENCOUNTER — APPOINTMENT (OUTPATIENT)
Dept: OCCUPATIONAL THERAPY | Facility: CLINIC | Age: 47
DRG: 441 | End: 2017-07-31
Payer: MEDICARE

## 2017-07-31 LAB
ALBUMIN SERPL-MCNC: 2.9 G/DL (ref 3.4–5)
ALP SERPL-CCNC: 175 U/L (ref 40–150)
ALT SERPL W P-5'-P-CCNC: 37 U/L (ref 0–70)
ANION GAP SERPL CALCULATED.3IONS-SCNC: 7 MMOL/L (ref 3–14)
AST SERPL W P-5'-P-CCNC: 58 U/L (ref 0–45)
BACTERIA SPEC CULT: NO GROWTH
BILIRUB SERPL-MCNC: 1.4 MG/DL (ref 0.2–1.3)
BUN SERPL-MCNC: 10 MG/DL (ref 7–30)
CALCIUM SERPL-MCNC: 8.3 MG/DL (ref 8.5–10.1)
CHLORIDE SERPL-SCNC: 109 MMOL/L (ref 94–109)
CO2 SERPL-SCNC: 24 MMOL/L (ref 20–32)
CREAT SERPL-MCNC: 0.82 MG/DL (ref 0.66–1.25)
ERYTHROCYTE [DISTWIDTH] IN BLOOD BY AUTOMATED COUNT: 15.4 % (ref 10–15)
GFR SERPL CREATININE-BSD FRML MDRD: ABNORMAL ML/MIN/1.7M2
GLUCOSE BLDC GLUCOMTR-MCNC: 162 MG/DL (ref 70–99)
GLUCOSE BLDC GLUCOMTR-MCNC: 279 MG/DL (ref 70–99)
GLUCOSE BLDC GLUCOMTR-MCNC: 291 MG/DL (ref 70–99)
GLUCOSE BLDC GLUCOMTR-MCNC: 349 MG/DL (ref 70–99)
GLUCOSE BLDC GLUCOMTR-MCNC: 369 MG/DL (ref 70–99)
GLUCOSE SERPL-MCNC: 137 MG/DL (ref 70–99)
HCT VFR BLD AUTO: 36.4 % (ref 40–53)
HGB BLD-MCNC: 12.2 G/DL (ref 13.3–17.7)
Lab: NORMAL
MCH RBC QN AUTO: 31.7 PG (ref 26.5–33)
MCHC RBC AUTO-ENTMCNC: 33.5 G/DL (ref 31.5–36.5)
MCV RBC AUTO: 95 FL (ref 78–100)
MICRO REPORT STATUS: NORMAL
PLATELET # BLD AUTO: 42 10E9/L (ref 150–450)
POTASSIUM SERPL-SCNC: 4 MMOL/L (ref 3.4–5.3)
PROT SERPL-MCNC: 6.1 G/DL (ref 6.8–8.8)
RBC # BLD AUTO: 3.85 10E12/L (ref 4.4–5.9)
SODIUM SERPL-SCNC: 140 MMOL/L (ref 133–144)
SPECIMEN SOURCE: NORMAL
T4 FREE SERPL-MCNC: 1.14 NG/DL (ref 0.76–1.46)
WBC # BLD AUTO: 1.6 10E9/L (ref 4–11)

## 2017-07-31 PROCEDURE — 99221 1ST HOSP IP/OBS SF/LOW 40: CPT | Performed by: PSYCHIATRY & NEUROLOGY

## 2017-07-31 PROCEDURE — 25000132 ZZH RX MED GY IP 250 OP 250 PS 637: Mod: GY | Performed by: STUDENT IN AN ORGANIZED HEALTH CARE EDUCATION/TRAINING PROGRAM

## 2017-07-31 PROCEDURE — 84439 ASSAY OF FREE THYROXINE: CPT | Performed by: STUDENT IN AN ORGANIZED HEALTH CARE EDUCATION/TRAINING PROGRAM

## 2017-07-31 PROCEDURE — 12000001 ZZH R&B MED SURG/OB UMMC

## 2017-07-31 PROCEDURE — 85027 COMPLETE CBC AUTOMATED: CPT | Performed by: STUDENT IN AN ORGANIZED HEALTH CARE EDUCATION/TRAINING PROGRAM

## 2017-07-31 PROCEDURE — 97535 SELF CARE MNGMENT TRAINING: CPT | Mod: GO

## 2017-07-31 PROCEDURE — 99232 SBSQ HOSP IP/OBS MODERATE 35: CPT | Mod: GC | Performed by: HOSPITALIST

## 2017-07-31 PROCEDURE — 00000146 ZZHCL STATISTIC GLUCOSE BY METER IP

## 2017-07-31 PROCEDURE — A9270 NON-COVERED ITEM OR SERVICE: HCPCS | Mod: GY | Performed by: STUDENT IN AN ORGANIZED HEALTH CARE EDUCATION/TRAINING PROGRAM

## 2017-07-31 PROCEDURE — 25000132 ZZH RX MED GY IP 250 OP 250 PS 637: Mod: GY | Performed by: HOSPITALIST

## 2017-07-31 PROCEDURE — 80053 COMPREHEN METABOLIC PANEL: CPT | Performed by: STUDENT IN AN ORGANIZED HEALTH CARE EDUCATION/TRAINING PROGRAM

## 2017-07-31 PROCEDURE — 36592 COLLECT BLOOD FROM PICC: CPT | Performed by: STUDENT IN AN ORGANIZED HEALTH CARE EDUCATION/TRAINING PROGRAM

## 2017-07-31 PROCEDURE — A9270 NON-COVERED ITEM OR SERVICE: HCPCS | Mod: GY | Performed by: HOSPITALIST

## 2017-07-31 PROCEDURE — 40000133 ZZH STATISTIC OT WARD VISIT

## 2017-07-31 PROCEDURE — 40000893 ZZH STATISTIC PT IP EVAL DEFER

## 2017-07-31 PROCEDURE — 97165 OT EVAL LOW COMPLEX 30 MIN: CPT | Mod: GO

## 2017-07-31 RX ORDER — GLIPIZIDE 10 MG/1
10 TABLET, FILM COATED, EXTENDED RELEASE ORAL
Status: DISCONTINUED | OUTPATIENT
Start: 2017-08-01 | End: 2017-08-01 | Stop reason: HOSPADM

## 2017-07-31 RX ORDER — SIMETHICONE 80 MG
80 TABLET,CHEWABLE ORAL EVERY 6 HOURS PRN
Status: DISCONTINUED | OUTPATIENT
Start: 2017-07-31 | End: 2017-08-01 | Stop reason: HOSPADM

## 2017-07-31 RX ADMIN — PRAVASTATIN SODIUM 80 MG: 20 TABLET ORAL at 08:38

## 2017-07-31 RX ADMIN — FERROUS SULFATE TAB 325 MG (65 MG ELEMENTAL FE) 325 MG: 325 (65 FE) TAB at 19:40

## 2017-07-31 RX ADMIN — EXTENDED PHENYTOIN SODIUM 120 MG: 30 CAPSULE ORAL at 08:41

## 2017-07-31 RX ADMIN — INSULIN ASPART 4 UNITS: 100 INJECTION, SOLUTION INTRAVENOUS; SUBCUTANEOUS at 18:04

## 2017-07-31 RX ADMIN — Medication 100 MG: at 08:39

## 2017-07-31 RX ADMIN — LACTULOSE 5 G: 20 SOLUTION ORAL at 18:03

## 2017-07-31 RX ADMIN — FERROUS SULFATE TAB 325 MG (65 MG ELEMENTAL FE) 325 MG: 325 (65 FE) TAB at 08:38

## 2017-07-31 RX ADMIN — EXTENDED PHENYTOIN SODIUM 120 MG: 30 CAPSULE ORAL at 19:39

## 2017-07-31 RX ADMIN — VITAMIN D, TAB 1000IU (100/BT) 1000 UNITS: 25 TAB at 08:38

## 2017-07-31 RX ADMIN — SIMETHICONE CHEW TAB 80 MG 80 MG: 80 TABLET ORAL at 18:05

## 2017-07-31 RX ADMIN — LACTULOSE 5 G: 20 SOLUTION ORAL at 08:40

## 2017-07-31 RX ADMIN — INSULIN ASPART 5 UNITS: 100 INJECTION, SOLUTION INTRAVENOUS; SUBCUTANEOUS at 13:33

## 2017-07-31 RX ADMIN — MIRTAZAPINE 15 MG: 15 TABLET, FILM COATED ORAL at 22:11

## 2017-07-31 RX ADMIN — MULTIPLE VITAMINS W/ MINERALS TAB 1 TABLET: TAB at 08:38

## 2017-07-31 RX ADMIN — RIFAXIMIN 550 MG: 550 TABLET ORAL at 08:39

## 2017-07-31 RX ADMIN — CYANOCOBALAMIN TAB 1000 MCG 1000 MCG: 1000 TAB at 08:38

## 2017-07-31 RX ADMIN — INSULIN ASPART 3 UNITS: 100 INJECTION, SOLUTION INTRAVENOUS; SUBCUTANEOUS at 08:39

## 2017-07-31 RX ADMIN — OMEPRAZOLE 40 MG: 20 CAPSULE, DELAYED RELEASE ORAL at 08:38

## 2017-07-31 RX ADMIN — HYDROXYZINE HYDROCHLORIDE 25 MG: 25 TABLET ORAL at 08:39

## 2017-07-31 RX ADMIN — OYSTER SHELL CALCIUM WITH VITAMIN D 1 TABLET: 500; 200 TABLET, FILM COATED ORAL at 08:38

## 2017-07-31 RX ADMIN — RIFAXIMIN 550 MG: 550 TABLET ORAL at 19:39

## 2017-07-31 RX ADMIN — OMEPRAZOLE 40 MG: 20 CAPSULE, DELAYED RELEASE ORAL at 19:40

## 2017-07-31 RX ADMIN — MULTIPLE VITAMINS W/ MINERALS TAB 1 TABLET: TAB at 19:40

## 2017-07-31 RX ADMIN — HYDROXYZINE HYDROCHLORIDE 25 MG: 25 TABLET ORAL at 19:40

## 2017-07-31 RX ADMIN — LEVOTHYROXINE SODIUM 88 MCG: 88 TABLET ORAL at 08:40

## 2017-07-31 ASSESSMENT — ACTIVITIES OF DAILY LIVING (ADL): PREVIOUS_RESPONSIBILITIES: MEAL PREP;HOUSEKEEPING;LAUNDRY;MEDICATION MANAGEMENT;FINANCES

## 2017-07-31 NOTE — CONSULTS
Patient seen and chart reviewed. Formal consult dictated.(initial)    Discussed with treatment team    Tera Orosco MD

## 2017-07-31 NOTE — PLAN OF CARE
Problem: Goal Outcome Summary  Goal: Goal Outcome Summary  PT 5B: Will sign off. Per chart review and conversation with SW, no acute PT needs identified.

## 2017-07-31 NOTE — PROGRESS NOTES
[facility]  Patient is currently open to home care services with [facility].  The patient is currently receiving RN  services.  [facility]  and team have been notified of patient admission.  [facility] liaison will continue to follow patient during stay.  If appropriate provide orders to resume home care at time of discharge.    Sindi Gagnon RN, BSN  [facility] Liaison  [phone]

## 2017-07-31 NOTE — PROGRESS NOTES
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   Patient and wife refers that his last seizure was 3 weeks ago, before his dilantin was increased to 90 -120 g.  In the last week he has progressively got more encephalopathic with some episodes of staring and memory lapses.  Dilantin was increased to 120 mg BID yesterday    He refers that he is feeling much better.     Review of notes:   Nurses: Patient alert, oriented x3, no acute incidences during shift.  MDs to d/c lactulose protocol and only want him to take scheduled lactulose               Medications:     Current Facility-Administered Medications   Medication     sodium chloride (PF) 0.9% PF flush 3 mL     phenytoin (DILANTIN) CR capsule 120 mg     lactulose (CHRONULAC) solution 5 g     naloxone (NARCAN) injection 0.1-0.4 mg     calcium carb 1250 mg (500 mg [place])/vitamin D 200 units (OSCAL with D) per tablet 1 tablet     cholecalciferol (vitamin D) tablet 1,000 Units     cyanocobalamin (vitamin  B-12) tablet 1,000 mcg     phenytoin (DILANTIN) CR capsule 120 mg     thiamine tablet 100 mg     rifaximin (XIFAXAN) tablet 550 mg     pravastatin (PRAVACHOL) tablet 80 mg     omeprazole (priLOSEC) CR capsule 40 mg     multivitamin, therapeutic with minerals (THERA-VIT-M) tablet 1 tablet     mirtazapine (REMERON) tablet 15 mg     melatonin tablet 5 mg     levothyroxine (SYNTHROID/LEVOTHROID) tablet 88 mcg     lactulose (CHRONULAC) solution 30 mL     hydrOXYzine (ATARAX) tablet 25 mg     ferrous sulfate (IRON) tablet 325 mg     glucose 40 % gel 15-30 g    Or     dextrose 50 % injection 25-50 mL    Or     glucagon injection 1 mg     insulin aspart (NovoLOG) inj (RAPID ACTING)     insulin aspart (NovoLOG) inj  (RAPID ACTING)     Daily 2 GRAM acetaminophen limit, unless fulminent liver failure or transaminases greater than or equal to 300 - 400, then none     lactulose (CHRONULAC) solution 20 g    Or     lactulose (CHRONULAC) solution for enema prep 100 g     hypromellose-dextran (ARTIFICAL TEARS) ophthalmic solution 2 drop          Exam     /82 (BP Location: Left arm)  Pulse 63  Temp 96  F (35.6  C) (Oral)  Resp 18  Wt 95.7 kg (211 lb)  SpO2 96%  BMI 30.28 kg/m2    Constitutional : frail  Head:anicteric. See neuroexam  Eyes: see neuroexam  CVC: well perfused. Port noted on R side of his chest. No inflammation.   LUng:On room air. No respiratory distress.   Adomen: non distended  Extremities:  No edema. Well perfused  Psychiatry: in good mood, making jokes during exam. Collaborative  Neuroexam  Alert.  Oriented to place, and  Self, place    Follow commands  Face symmetric  Eyes: motility preserved on horizontal and vertical axis, no nystagmus  Visual filed cut on the R side.   No tremors  No dysmetria (arms and legs tested)  Strength preserved on arms and legs , proximally and distally  Reflexes preserved.   Sensory exam to light touch: preserved   No aphasia  No dysarthria  romberg negative   gait: mildly wide base, cautious.            Data:    CBC RESULTS:   Recent Labs   Lab Test  07/31/17   0647   WBC  1.6*   RBC  3.85*   HGB  12.2*   HCT  36.4*   MCV  95   MCH  31.7   MCHC  33.5   RDW  15.4*   PLT  42*     Last Basic Metabolic Panel:  Lab Results   Component Value Date     07/31/2017      Lab Results   Component Value Date    POTASSIUM 4.0 07/31/2017     Lab Results   Component Value Date    CHLORIDE 109 07/31/2017     Lab Results   Component Value Date    UCHE 8.3 07/31/2017     Lab Results   Component Value Date    CO2 24 07/31/2017     Lab Results   Component Value Date    BUN 10 07/31/2017     Lab Results   Component Value Date    CR 0.82 07/31/2017     Lab Results   Component Value Date    GLC  137 07/31/2017                    Assessment and Plan:    47 yo male with phx of cirrhosis, astrocytoma, symptomatic epilepsy admitted for elucidation and management of encephalopathy     Encephalopathy : improved. Multifactorial  HEpatic encephalopathy, post radiation and ressection of astrocytoma. HE did not have seizures while on EEG. He has an appointment with Dr. English on 10/19, please maintain this appointment. PAtient should have an Phenytoin level collected next week.        -Neurology signs off    Thank you for this consult, please contact Neurology Consult service through pager.         Attestation:  Patient discussed with Dr. Mikey Leone     PGY4 NEurology  [id number]    Agree with plan  I did not see this patient today the 31st of July 2017  Neal serrano md

## 2017-07-31 NOTE — PLAN OF CARE
Problem: Goal Outcome Summary  Goal: Goal Outcome Summary  Patient was seen by the team this morning, and referral will be made to psychiatrist due to concern from the spouse noticing that patient is more angry especially when he can't remember things.  Patient doing better per team, morning meds were given, was able to order his breakfast, , covered with insulin, up bathroom, voided and had BM, will be getting his shower, will continue to monitor.

## 2017-07-31 NOTE — PLAN OF CARE
Problem: Goal Outcome Summary  Goal: Goal Outcome Summary  [id number]     Patient alert, oriented x3, no acute incidences during shift. Patient slept in between cares and safety checks. 0200 . Per previous nurse, MDs to d/c lactulose protocol and only want him to take scheduled lactulose. Will need to follow up on days. No c/o pain, n/v. BA on for safety, except with wife at bedside. Will continue to monitor and alert MDs to any changes.

## 2017-07-31 NOTE — PROGRESS NOTES
07/31/17 0826   Quick Adds   Type of Visit Initial Occupational Therapy Evaluation   Living Environment   Lives With spouse   Living Arrangements house  ([facility] )   Number of Stairs to Enter Home 20   Number of Stairs Within Home 20   Living Environment Comment Pt lives in [facility] with his wife. Pt reports wife is not currently working and is able to provide 24 hour assist. Stairs in home that pt has to access. Bedroom is upstairs; main floor is living room and kitchen.    Self-Care   Dominant Hand right   Usual Activity Tolerance good   Current Activity Tolerance moderate   Functional Level Prior   Ambulation 0-->independent   Transferring 0-->independent   Toileting 0-->independent   Bathing 0-->independent   Dressing 0-->independent   Eating 0-->independent   Communication 0-->understands/communicates without difficulty   Swallowing 0-->swallows foods/liquids without difficulty   Cognition 1 - attention or memory deficits   Which of the above functional risks had a recent onset or change? cognition;ambulation;transferring;bathing   Prior Functional Level Comment Pt reports IND with ADLs at baseline.    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    Precautions/Limitations fall precautions;seizure precautions   General Observations Pt supine in bed upon arrival, agreeable to therapy.   General Info Comments Activity: ambulate with assist   Cognitive Status Examination   Orientation orientation to person, place and time   Level of Consciousness alert   Able to Follow Commands WNL/WFL   Cognitive  "Comment Pt endorses feeling confused, but reports that it is improving.   Visual Perception   Visual Perception Wears glasses   Visual Perception Comments Pt reports may need updated prescription in near future.   Sensory Examination   Sensory Quick Adds No deficits were identified   Pain Assessment   Patient Currently in Pain No   Integumentary/Edema   Integumentary/Edema no deficits were identifed   Range of Motion (ROM)   ROM Quick Adds No deficits were identified   Strength   Strength Comments WFL   Transfer Skill: Sit to Stand   Level of [place]: Sit/Stand stand-by assist   Physical Assist/Nonphysical Assist: Sit/Stand supervision   Instrumental Activities of Daily Living (IADL)   Previous Responsibilities meal prep;housekeeping;laundry;medication management;finances   IADL Comments Pt does not drive or work. Receives assist with medication management from wife.   Activities of Daily Living Analysis   Impairments Contributing to Impaired Activities of Daily Living cognition impaired;strength decreased;balance impaired   General Therapy Interventions   Planned Therapy Interventions ADL retraining;IADL retraining;cognition;transfer training;home program guidelines;progressive activity/exercise;risk factor education   Clinical Impression   Criteria for Skilled Therapeutic Interventions Met yes, treatment indicated   OT Diagnosis OT order for \"Eval and Treat; secondary to deconditioning. Also MOCA please\"   Influenced by the following impairments cognition, activity tolerance   Assessment of Occupational Performance 1-3 Performance Deficits   Identified Performance Deficits home mgmt, social skills, dressing   Clinical Decision Making (Complexity) Low complexity   Therapy Frequency daily   Predicted Duration of Therapy Intervention (days/wks) 1 week   Anticipated Equipment Needs at Discharge (TBD with further therapy)   Anticipated Discharge Disposition Home with Assist   Risks and Benefits of Treatment have " "been explained. Yes   Patient, Family & other staff in agreement with plan of care Yes   [facility] TM \"6 Clicks\"   2016, Trustees of [facility], under license to [employer].  All rights reserved.   6 Clicks Short Forms Daily Activity Inpatient Short Form   [facility] AM-PAC  \"6 Clicks\" Daily Activity Inpatient Short Form   1. Putting on and taking off regular lower body clothing? 3 - A Little   2. Bathing (including washing, rinsing, drying)? 3 - A Little   3. Toileting, which includes using toilet, bedpan or urinal? 3 - A Little   4. Putting on and taking off regular upper body clothing? 4 - None   5. Taking care of personal grooming such as brushing teeth? 4 - None   6. Eating meals? 4 - None   Daily Activity Raw Score (Score out of 24.Lower scores equate to lower levels of function) 21   Total Evaluation Time   Total Evaluation Time (Minutes) 10     "

## 2017-07-31 NOTE — CONSULTS
"  PSYCHIATRIC CONSULTATION    IDENTIFICATION:  Mr. Mono Varghese is a 46-year-old white male, who has a history of alcoholic liver disease, hepatic encephalopathy and unfortunately also had an astrocytoma which was resected so he has had a variety of brain insults.  I am asked to evaluate his current psychiatric status by his current psychiatric status by Dr. Mcclellan.     CHIEF COMPLAINT:  Confusion and irritability.    HISTORY OF PRESENT ILLNESS:  Mr. Varghese has a history of brain insult as mentioned above.  His wife was asking to speak to Psychiatry because she has to \"walk on eggshells around him.\" He becomes very angry with her and is quite irritable.  His short-term memory is so poor that it is unlikely he can remember why he was angry with her the last time.  He has become more difficult to live with.  The patient's wife is his PCA.  He has 1 day a week of nursing help, but the current system does not seem to be functioning well.  He came in because of an exacerbation of hepatic encephalopathy.  It is felt that he is not currently using alcohol, but certainly did in the past.  It is unclear exactly how compliant he has been with his medication regimen.  He does see psychiatry at the [facility] who have him on Remeron 15 as well as some Atarax.  He is not on any antipsychotic medications at present.    On my interview, the patient reports he is feeling better.  He reports his mood is good, but it is obvious in interviewing him that he does have a very poor memory.  His wife actually requested the consult so I had an opportunity to interview her alone.  The problem is the irritability and the fact that she has to walk on eggshells in order to keep him from yelling at her.  It turns out that the only person that he becomes angry with is his wife and she says he has never been physically aggressive in any way.  It is possible that he could be calmed down a little bit with antipsychotic such as " Seroquel, but I note that his current psychiatric team has opted not to do that and I think most of these issues are psychosocial in nature.  It does not appear that the current system is offering enough help and I did talk extensively with social work.  If possible, it would be great to have someone go in the home, be able to observe the interactions and see if the wife could be taught some behavior modification to make her life a bit easier.  The patient has been referred for therapy but it is unclear to me whether he would be able to remember from therapy session to therapy session what he had learned.  It is clear that the wife is becoming increasingly frustrated.  On my interview, she became quite tearful and clearly needs more help.  In the end, I am afraid this patient will need placement likely in a group home, but for now maybe we can increase his time at home by increasing in-home services.  I suggested 1 possibility could be in-home [facility] nursing.    PAST MEDICAL HISTORY:  Includes brain tumor and liver failure.    PAST SURGICAL HISTORY:  Includes multiple abdominal procedures as well as craniotomy and excision of brain tumor.  He has also had orthopedic surgery to his hand.    ALLERGIES:  He has no known drug allergies, but is allergic to Tegaderm.    FAMILY HISTORY:  The patient's father unfortunately  from alcoholic cirrhosis.    SOCIAL HISTORY:  The patient has been sober for 2 years but prior to that drank a 12-pack 2 to 3 times per week.  He apparently is not abusing any drugs at present.  He is a nonsmoker.  He is living with his wife who is his PCA.  There is very little support from his side of the family and unfortunately very little support for his wife.    PHYSICAL REVIEW OF SYSTEMS:  On my interview, the patient reports frequent headaches but none currently.  He has some difficulty with his vision.  He denies problems with hearing.  He has some shortness of breath because his  "\"belly is so big.\" He obviously does have a swollen abdomen.  He denies chest pain.  He reports he is defecating and has no genitourinary symptoms.  He denies problems with muscles, skin or joints.  There is no known endocrinopathies.    MENTAL STATUS EXAMINATION:  On my interview, the patient was pleasant and cooperative.  He reports his mood is good.  His affect was full and appropriate.  His speech was coherent and goal oriented.  His associations were tight.  His thought processes logical and linear.  His content of thought was without psychosis or suicidal ideation.  Recent memory was clearly very impaired.  He has difficulty remembering what has been said.  Yesterday he forgot his dog's name.  His long-term memory may be better preserved, but still is impaired.  Concentration seemed intact.  Fund of knowledge is impaired.  Use of language was within normal limits.  He was able to name the current president, but none prior to the current president.  He was not oriented to month, thought it was April. Insight and judgment are very poor.  Muscle strength and tone appear to be at his baseline.  He was only oriented x2.  Recent vital signs, include a temperature of 96, pulse is 63, blood pressure of 123/82, and respiration rate of 18, with 96% oxygen saturation.    ASSESSMENT:  Dementia with superimposed delirium from hepatic encephalopathy.    RECOMMENDATION:  I did talk to social work about attempting to get him more in-home services, hopefully some in-home therapy to help the patient's wife deal with his irritability.  I am concerned that the in-home system will likely fail and unfortunately he may need placement certainly at some point in the future.         Tera Orosco MD    D:  07/31/2017 13:30 T:  07/31/2017 14:08  Document:  [id number]  "

## 2017-07-31 NOTE — PLAN OF CARE
Problem: Goal Outcome Summary  Goal: Goal Outcome Summary  Outcome: Therapy, progress toward functional goals as expected  OT/5B - OT evaluation completed, treatment initiated.  Pt scores 10/30 on MoCA version 7.1 indicating cognitive impairment; a score of 26 or greater is considered normal. Pt endorses feeling confused, but reports feeling that confusion has improved recently.  Pt with difficulty recalling portions of living environment during evaluation demonstrating difficulty with memory.  Close SBA for functional mobility in room. Pt limited due to seizure precautions, cognition, and activity tolerance.     REC: home with 24 hour assist/supervision

## 2017-07-31 NOTE — PROGRESS NOTES
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   Patient and wife refers that his last seizure was 3 weeks ago, before his dilantin was increased to 90 -120 g.  In the last week he has progressively got more encephalopathic with some episodes of staring and memory lapses.               Medications:     Current Facility-Administered Medications   Medication     [START ON 7/31/2017] phenytoin (DILANTIN) CR capsule 120 mg     lactulose (CHRONULAC) solution 5 g     naloxone (NARCAN) injection 0.1-0.4 mg     calcium carb 1250 mg (500 mg [place])/vitamin D 200 units (OSCAL with D) per tablet 1 tablet     cholecalciferol (vitamin D) tablet 1,000 Units     cyanocobalamin (vitamin  B-12) tablet 1,000 mcg     phenytoin (DILANTIN) CR capsule 120 mg     thiamine tablet 100 mg     rifaximin (XIFAXAN) tablet 550 mg     pravastatin (PRAVACHOL) tablet 80 mg     omeprazole (priLOSEC) CR capsule 40 mg     multivitamin, therapeutic with minerals (THERA-VIT-M) tablet 1 tablet     mirtazapine (REMERON) tablet 15 mg     melatonin tablet 5 mg     levothyroxine (SYNTHROID/LEVOTHROID) tablet 88 mcg     lactulose (CHRONULAC) solution 30 mL     hydrOXYzine (ATARAX) tablet 25 mg     ferrous sulfate (IRON) tablet 325 mg     glucose 40 % gel 15-30 g    Or     dextrose 50 % injection 25-50 mL    Or     glucagon injection 1 mg     insulin aspart (NovoLOG) inj (RAPID ACTING)     insulin aspart (NovoLOG) inj (RAPID ACTING)     Daily 2 GRAM acetaminophen limit, unless fulminent liver failure or transaminases greater than or equal to 300 - 400, then none     lactulose (CHRONULAC) solution 20 g    Or     lactulose (CHRONULAC) solution for enema prep 100 g     hypromellose-dextran (ARTIFICAL TEARS)  ophthalmic solution 2 drop            Exam     /81 (BP Location: Left arm)  Pulse 57  Temp 98.2  F (36.8  C) (Oral)  Resp 16  Wt 95.7 kg (211 lb)  SpO2 90%  BMI 30.28 kg/m2BMI    Constitutional : frail  Head: mildly  icteric. See neuroexam  Eyes: see neuroexam  CVC: well perfused  LUng:On room air. No respiratory distress.   Adomen: non distended  Extremities:  No edema. Well perfused  Psychiatry: in good mood. Collaborative  Neuroexam  Alert.  Oriented to place, and  Self.  Bradpsychic.    Follow commands  Face symmetric  Eyes: motility preserved on horizontal and vertical axis, no nystagmus  Visual filed cut on the R side.   No tremors  No dysmetria (arms and legs tested)  Strength preserved on arms and legs , proximally and distally  Reflexes preserved.   Sensory exam to light touch: preserved   No aphasia  No dysarthria            Data:     Results for orders placed or performed during the hospital encounter of 07/29/17 (from the past 24 hour(s))   Glucose by meter   Result Value Ref Range    Glucose 131 (H) 70 - 99 mg/dL   UA with Microscopic   Result Value Ref Range    Color Urine Light Yellow     Appearance Urine Clear     Glucose Urine Negative NEG mg/dL    Bilirubin Urine Negative NEG    Ketones Urine Negative NEG mg/dL    Specific Gravity Urine 1.003 1.003 - 1.035    Blood Urine Negative NEG    pH Urine 6.0 5.0 - 7.0 pH    Protein Albumin Urine Negative NEG mg/dL    Urobilinogen mg/dL Normal 0.0 - 2.0 mg/dL    Nitrite Urine Negative NEG    Leukocyte Esterase Urine Negative NEG    Source Clean catch urine     WBC Urine 0 0 - 2 /HPF    RBC Urine <1 0 - 2 /HPF   Urine Culture Aerobic Bacterial   Result Value Ref Range    Specimen Description Midstream Urine     Special Requests Specimen received in preservative     Culture Micro Culture negative < 24 hours, reincubate     Micro Report Status Pending    Glucose by meter   Result Value Ref Range    Glucose 99 70 - 99 mg/dL   Basic metabolic panel    Result Value Ref Range    Sodium 141 133 - 144 mmol/L    Potassium 3.8 3.4 - 5.3 mmol/L    Chloride 109 94 - 109 mmol/L    Carbon Dioxide 25 20 - 32 mmol/L    Anion Gap 8 3 - 14 mmol/L    Glucose 91 70 - 99 mg/dL    Urea Nitrogen 8 7 - 30 mg/dL    Creatinine 0.91 0.66 - 1.25 mg/dL    GFR Estimate 89 >60 mL/min/1.7m2    GFR Estimate If Black >90   GFR Calc   >60 mL/min/1.7m2    Calcium 8.6 8.5 - 10.1 mg/dL   CBC with platelets   Result Value Ref Range    WBC 1.6 (L) 4.0 - 11.0 10e9/L    RBC Count 3.85 (L) 4.4 - 5.9 10e12/L    Hemoglobin 12.4 (L) 13.3 - 17.7 g/dL    Hematocrit 36.1 (L) 40.0 - 53.0 %    MCV 94 78 - 100 fl    MCH 32.2 26.5 - 33.0 pg    MCHC 34.3 31.5 - 36.5 g/dL    RDW 15.6 (H) 10.0 - 15.0 %    Platelet Count 36 (LL) 150 - 450 10e9/L   Phenytoin free   Result Value Ref Range    Phenytoin Free (L)      <0.50  Analysis performed by [employer] [place] [address]  Reference range: 1.00  to  2.00  Unit: ug/ml     Phenytoin level   Result Value Ref Range    Phenytoin Level 4.0 (L) 10 - 20 mg/L   TSH   Result Value Ref Range    TSH 13.23 (H) 0.40 - 4.00 mU/L   Blood culture   Result Value Ref Range    Specimen Description Blood Unspecified Site     Culture Micro No growth after 7 hours     Micro Report Status Pending    Glucose by meter   Result Value Ref Range    Glucose 152 (H) 70 - 99 mg/dL   Glucose by meter   Result Value Ref Range    Glucose 351 (H) 70 - 99 mg/dL   Hemoglobin   Result Value Ref Range    Hemoglobin 12.2 (L) 13.3 - 17.7 g/dL   Glucose by meter   Result Value Ref Range    Glucose 264 (H) 70 - 99 mg/dL     *Note: Due to a large number of results and/or encounters for the requested time period, some results have not been displayed. A complete set of results can be found in Results Review.              Assessment and Plan:   45 yo male with phx of cirrhosis, astrocytoma, symptomatic epilepsy admitted for elucidation and management of  encephalopathy    Encephalopathy : multifactorial. HEpatic encephalopathy, post radiation and ressection of astrocytoma. It is difficult to know if his episodes of staring are seizures or not. HE did not have seizures while on EEG. Neurology agree with increasing dilatin to 120 mg BID. If he is discharged, a level should be check next week.        Attestation:  Patient seen and discussed with Dr. Edgardo Leone    PGY4 NEurology  [id number]

## 2017-07-31 NOTE — PROGRESS NOTES
Social Work Services Progress Note    Hospital Day: 3  Date of Initial Social Work Evaluation:  7/31/2017  Collaborated with:  Psychiatrist, spouse, patient, medicine, Dimitri TOMPKINS CM, chart review    Data:  Mono is a 46 year old male admitted to [facility] 7/29/2017 after one and a half weeks of increasing confusion, and 4 days of abdominal pain and distension. PMH of brain cancer w/surgical intervention, short term memory loss at baseline, liver disease and concern for seizures. Mono lives at home with his wife who is also his PCA.   Yisel notes a worsening change in his short term memory recall. She states prior to two weeks ago his short term memory was impaired as usual where he would forget his words but after 40 seconds or so of prompting, he would remember. She states for the last week and a half, he has lost this delayed recall ability as well.   Aside from Yisel, the only other really invovled family member is Yisel's mother who is 71 years old, and an NP. She provides care, emotional, financial, etc support as able/needed. Mono's parents live in [place] but are very un-involved. His sister lives locally but does not help out when Yisel reaches out to her. Yisel also states she is working with [employer] through the [place] unemployment network to find Mono employment that is supported/supervised. He worked as a  full time-60 hours a week, until his brain cancer diagnosis in 2013. He is eager to find employment and Yisel is supportive of this. Yisel is currently only employed as his PCA.     Intervention:    Yisel expresses much frustration over what she has perceived to be ongoing ambiguity about his medical work up, and lack of answers to her questions. She specifically states concern around: not knowing why his belly is distended and why there is no fluid to tap, why he can't have an MRI in patient to see if there are changes, why his ultrasound hasn't taken place that was  scheduled for yesterday, and why medical staff continually asks him the same questions that upset him. SW addressed with her the difficulty of liver disease and how distention and AMS can fluctuate so profusely and rapidly. SW also explained the orienting questions to them and why they are important markers to document, not intended to be a sole source of information. They demonstrate understanding.     ERNST spoke with patient's TURNER garcia CM= Jared Garvin with [facility] [phone] and updated to hospitalization.   ERNST discussed with Yisel the importance of pursuing legal POA for patient now as the future is so ambiguous at this point, sw provided assistance on how to pursue this.   Yisel would like neuro-psych eval to be scheduled prior to DC for follow up on the change in mentation.     Assessment:  Yisel endorses a significant memory impairment beginning 1.5 years ago after Mono cam out of his month long coma following brain surgery. She states however that he has also had a very significant decline in recent months an can not longer follow two part directions without assist along the way. She states Mono needs prompting to eat, dress, shower, etc for all ADL's and requires supervision for most of these or hands on assist. His balance has been worsening for the last few months, but she states his last fall was one month ago.     Mono was present and participating in the conversation. His thought process was delayed but he did demonstrate appropriate understanding of discussions and offered insight, at times feeling down about himself as this lengthy conversation was difficult in nature. But ERNST was able to reframe the discussion for Mono and he found this helpful. ERNST spent 60 minutes with patient/spouse and validated all concerns. ERNST relayed all concerns to medicine team asking resident and or attending to follow up with this family today. SW also talked with psychiatry, they feel coping skills  "for the wife around the patients behavioral changes and anger would be most helpful. Yisel denies any physical danger but states he does have very intense sudden angry outbursts verbally. Yisel is open to intervention this, [facility] is putting in  HC to follow up on this.    Plan:    Anticipated Disposition:  Home with services    Barriers to d/c plan: medical readiness, work up    Follow Up:  ERNST spoke with [facility] resident about families un-clarity and frustrations. SW provided lengthy hand off of concerns and resident agreed to go see patient/spouse and address concerns.    SW met with spouse after resident update, she feels it was very \"un-beneficial\" and \"did not address anything she was hoping to\". ERNST spoke with [facility] attending directly and provided update to concerns. He will see this patient/spouse today before 5 PM.    Roseann COLÓN, MSW  5B  (Medical/Surgical)  Phone: [phone]  Pager: [phone]     "

## 2017-07-31 NOTE — PROGRESS NOTES
[facility], [place]    Internal Medicine Progress Note    Date of Service (when Attending saw the patient): 07/31/2017    Interval History   No events overnight, patient slept well in own room now after prior altercations with roommates. Patient appears well, hungry for breakfast.  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       Plan:  #AMS - Multifactorial  In the setting of partially resected astrocytoma, patients are highly susceptible to AMS 2/2 to extra-cns pathologies even if small in magnitude such as mild hepatic encephalopathy and hypothyroidism.    -Cont. Increased Dilantin Dose: 120mg bid  -Check Dilantin levels (Free & Tot.) q3days  -Lactulose 5g BID  -Cont. Rifaximin  -OT [place] showed 10/30  -low Na+ diet    #Psych  The entire team had a discussion with Mono and his wife Yisel this AM.  Mono has been having some temper problems at home where he gets angry and yells at his wife.  In the hospital he has been moved twice b/c he made racist comments and had altercations with another roommate.  He has also been taking anger out on Yisel while in hospital because he is frustrated with all the doctors asking him AO type questions.  Yisel, said prior to these past 1-2 weeks, they have a good relationship but however they talked about getting psychiatry involved to help with these issues.  They already had an appointment made which was canceled due to admission to the hospital for confusion/abdominal swelling.  -Psych consult    #Subclnical Hypothyroidism - TSH 13.2, Normal Free T4    -cont.  Levothyroxine    #pancytopenia  -multivitamins, B12/Fe    #T2D  -medium ss  -PCOT glucose checks q4  -hold home oral glipizide    #Depression  -citalopram    Disposition Plan: Possible d/c today or tomorrow.   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    Plan:  #AMS  Etiology not entirely clear, but seems most c/w hepatic encephalopathy in the setting of vulnerable brain (astrocytoma history). EEG showing diffuse encephalopathy also c/w HE. No obvious seizure activity, however dilantin level was low. Abd U/S showed minimal ascites and stable partially occluding portal venous thrombus. Ammonia level was elevated. Pt currently being treated for hepatic encephalopathy in the presence of known cirrhosis and cc of abdominal pain and distension. But less likely with pt alert, oriented, and awake with no asterixis. Although per Neurology this pt is prone AMS with small insult given his previous hx of astrocytoma. CT head negative for acute intracranial hemorrhage and or ischemic stroke. Negative sepsis work-up  Pt improving with lactulose therapy. Notably patient's MOCA is 10/30 and there is some concern about patient's outbursts at home.   - Lactulose 5g BID  - Cont. Rifaximin  - Psychiatry consult to further assist with anger outbursts   - low Na+ diet    # Hx of seizures  Dilantin level low, so increased per neurology  - Continue increased Dilantin Dose: 120mg bid  - Check Dilantin levels (Free & Tot.) in a few days    # Hx of astrocytoma  S/p partial resection, chemo/rads. Still with active disease  - Will try to arrange neurooncology f/u at [facility]    #Subclnical Hypothyroidism  TSH 13.2, Normal Free T4. In the setting of acute illness, will  not make adjustments  - Recheck TSH in 4 weeks    - Cont. Levothyroxine 88mcg daily    #Pancytopenia  Chronically low, at baseline. 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 Patiño,      Physical Exam (Student)  /82 (BP Location: Left arm)  Pulse 63  Temp 96  F (35.6  C) (Oral)  Resp 18  Wt 95.7 kg (211 lb)  SpO2 96%  BMI 30.28 kg/m2    Constitutional: Appears well, stable, good mood sitting in bed eating lunch.  AOx3, first time he knew the year.    Eyes: PERRL, EOMI  HEENT: CNs intact  Respiratory: Clear lung fields, no accessory m. use  Cardiovascular: normal S1/S2, RRR  GI: distended abdomen, RUQ tenderness to deep palpatoin, normal bowel sounds   Skin: No visible telangiectasias or palmar erythema or jaundice  Neurologic: CNs intact, sensory intact in extremities.  Normal reflexes, normal strength bilaterally.    Psychiatric: Patient has good sense of humor, but same baseline confusion since admission.     Data Interpretation  I have reviewed today's vital signs, medications, labs and imaging which are significant for AMS likely multifactorial 2/2 astrocytoma s/p partial resection, hypothyroidism and liver failure.       Physical Exam (Resident)  Constitutional: Appears well, stable, good mood sitting in bed eating lunch.  AOX2/3, didn't know what year it was but knew it was summer.  Eyes: PERRL, EOMI  HEENT: CNs intact  Respiratory: Clear lung fields, no accessory m. use  Cardiovascular: normal S1/S2, RRR  GI: distended abdomen, RUQ tenderness to deep palpatoin, normal bowel sounds   Skin: No visible telangiectasias or palmar erythema or jaundice  Neurologic: CNs intact,  sensory intact in extremities.  Normal reflexes, normal strength bilaterally.    Psychiatric: Patient has good sense of humor, but same baseline confusion since admission.    Data  - Reviewed in EPIC   {MEDICAL STUDENT SIGNATURE: Kevon Hdz, MS3 Patrick Mcclellan 7/31/2017 1:17 PM     Medications     - MEDICATION INSTRUCTIONS -         sodium chloride (PF)  3 mL Intracatheter Q8H     phenytoin  120 mg Oral QAM     lactulose  5 g Oral BID     calcium carb 1250 mg (500 mg [place])/vitamin D 200 units  1 tablet Oral QAM     cholecalciferol  1,000 Units Oral Daily     cyanocobalamin  1,000 mcg Per G Tube Daily     phenytoin  120 mg Oral QPM     thiamine  100 mg Oral Daily     rifaximin  550 mg Oral BID     pravastatin  80 mg Oral Daily     omeprazole  40 mg Oral BID     multivitamin, therapeutic with minerals  1 tablet Oral BID     mirtazapine  15 mg Oral At Bedtime     levothyroxine  88 mcg Oral Daily     hydrOXYzine  25 mg Oral BID     ferrous sulfate  325 mg Oral BID     insulin aspart  1-7 Units Subcutaneous TID AC     insulin aspart  1-5 Units Subcutaneous At Bedtime     hypromellose-dextran  2 drop Both Eyes Q8H       Data     Recent Labs  Lab 07/30/17  1636 07/30/17  0549 07/29/17  1201   WBC  --  1.6* 1.7*   HGB 12.2* 12.4* 12.6*   MCV  --  94 94   PLT  --  36* 42*   INR  --   --  1.45*   NA  --  141 137   POTASSIUM  --  3.8 3.9   CHLORIDE  --  109 108   CO2  --  25 25   BUN  --  8 6*   CR  --  0.91 0.87   ANIONGAP  --  8 5   UCHE  --  8.6 8.3*   GLC  --  91 146*   ALBUMIN  --   --  3.1*   PROTTOTAL  --   --  6.5*   BILITOTAL  --   --  1.3   ALKPHOS  --   --  200*   ALT  --   --  40   AST  --   --  50*       No results found for this or any previous visit (from the past 24 hour(s)).

## 2017-07-31 NOTE — PLAN OF CARE
Problem: Goal Outcome Summary  Goal: Goal Outcome Summary  Patient has been sitting up in the chair most of the day, spouse available and assisting, had shower, voids in BR did not save, had BM X 1, gets scheduled lactulose, was up to [employer] with the wife, ate lunch there,  and 364, covered with insulin, good appetite, vitals stable, denies pain, abdomen distended, meausred by the spouse with assist for 110 cm.   Spouse wanted patient's toe nail to be trimmed and being a diabetic, contacted the Intern about it and she stated that they will do outpatient podiatry referral on discharge, since we do not have in-patient podiatrist.   Seen by a psychiatrist due to outbursts of anger that the spuse has been witnessing that is going on lately.    Patient is alert, forgetful, oriented to self, place, some situations, spouse wants us to ask other different questions during assessment instead of the same questions all the time.  Patient remembers some  things that happen in the morning but may forget things things like anniversary dates, etc.  Generally very pleasant. Continue to monitor.

## 2017-07-31 NOTE — PLAN OF CARE
Problem: Goal Outcome Summary  Goal: Goal Outcome Summary  [id number]     VSS, afebrile. On RA.      Disorientated to time, does not know the month or year. Generally has a logical thought process and is calm and cooperative. Appropriate this shift and compliant w/ therapies. Bed alarm and chair alarm are activated when pt's wife is not here. Does not always call appropriately.      Per [facility] team, pt is no longer on the lactulose protocol but rather taking 5g bid. Team stated that they will d/c the protocol order but have not yet. Please follow-up with tomorrow. Pt did take his scheduled 5g of lactulose. Number of BMs today is a rough estimate d/t pt's wife helping him to the bathroom and flushing the toilet. Per the pt's wife, Yisel, pt has had 10 small loose BMs today, cannot confirm the accuracy of this.     Urine output was not accurately measured this shift. Educated pt to use urinal.      Seizure precautions are in use. Pt on dilantin 120mg bid.

## 2017-07-31 NOTE — PROGRESS NOTES
Care Coordinator Progress Note     Admission Date/Time:  7/29/2017  Attending MD:  Toni Patiño MD     Data  Chart reviewed, discussed with interdisciplinary team.   Patient was admitted for:    Hepatic encephalopathy (H)  Portal vein thrombosis  End-stage liver disease (H).    Concerns with insurance coverage for discharge needs: None.  Current Living Situation: Patient lives with spouse.  Support System: Supportive and Involved  Services Involved: Home Care, PCA  Transportation: Family or Friend will provide  Barriers to Discharge: medical needs    Coordination of Care and Referrals: Provided patient/family with options for Home Care.        Assessment  Patient admitted with increasing confusion and increase in abdominal growth. History of cirrhosis, left parietal astrocytoma s/p partial resection, chemo, and radiation complicated by seizures. Patient lives at home with his wife Devyn, who is his PCA and 24 hour caregiver. Devyn has noticed a change in his mental status and balance recently at home. Patient scored 10/30 on [place] test and has some behaviors because of his cognitive status.  Met with patient and Devyn per their request to discuss home care. Patient currently open to [facility] for RN services. Devyn asking writer to notify home care that he will be discharged home even though his abdomen is still enlarged; per report, it was the home care nurses who urged Devyn to bring patient to hospital because of his growing belly. She wants to make sure they will be ok with him returning home. Writer to speak to [facility]. Resumption order placed. MD to meet with them this afternoon to address their concerns about going home.      Plan  Anticipated Discharge Date:  tomorrow  Anticipated Discharge Plan:  Home with wife and resumption of home care    Kyleigh Aburto RN

## 2017-08-01 VITALS
HEART RATE: 70 BPM | RESPIRATION RATE: 16 BRPM | TEMPERATURE: 97 F | BODY MASS INDEX: 30.28 KG/M2 | SYSTOLIC BLOOD PRESSURE: 125 MMHG | WEIGHT: 211 LBS | OXYGEN SATURATION: 93 % | DIASTOLIC BLOOD PRESSURE: 73 MMHG

## 2017-08-01 LAB
GLUCOSE BLDC GLUCOMTR-MCNC: 221 MG/DL (ref 70–99)
GLUCOSE BLDC GLUCOMTR-MCNC: 227 MG/DL (ref 70–99)
GLUCOSE BLDC GLUCOMTR-MCNC: 322 MG/DL (ref 70–99)

## 2017-08-01 PROCEDURE — 25000132 ZZH RX MED GY IP 250 OP 250 PS 637: Mod: GY | Performed by: STUDENT IN AN ORGANIZED HEALTH CARE EDUCATION/TRAINING PROGRAM

## 2017-08-01 PROCEDURE — A9270 NON-COVERED ITEM OR SERVICE: HCPCS | Mod: GY | Performed by: STUDENT IN AN ORGANIZED HEALTH CARE EDUCATION/TRAINING PROGRAM

## 2017-08-01 PROCEDURE — 00000146 ZZHCL STATISTIC GLUCOSE BY METER IP

## 2017-08-01 PROCEDURE — 99239 HOSP IP/OBS DSCHRG MGMT >30: CPT | Mod: GC | Performed by: INTERNAL MEDICINE

## 2017-08-01 PROCEDURE — 25000132 ZZH RX MED GY IP 250 OP 250 PS 637: Mod: GY | Performed by: INTERNAL MEDICINE

## 2017-08-01 PROCEDURE — A9270 NON-COVERED ITEM OR SERVICE: HCPCS | Mod: GY | Performed by: INTERNAL MEDICINE

## 2017-08-01 PROCEDURE — A9270 NON-COVERED ITEM OR SERVICE: HCPCS | Mod: GY | Performed by: HOSPITALIST

## 2017-08-01 PROCEDURE — 25000128 H RX IP 250 OP 636: Performed by: INTERNAL MEDICINE

## 2017-08-01 PROCEDURE — 25000132 ZZH RX MED GY IP 250 OP 250 PS 637: Mod: GY | Performed by: HOSPITALIST

## 2017-08-01 RX ORDER — LACTULOSE 10 G/15ML
5 SOLUTION ORAL 2 TIMES DAILY
Qty: 473 ML | Refills: 1 | Status: SHIPPED | OUTPATIENT
Start: 2017-08-01 | End: 2018-03-28

## 2017-08-01 RX ORDER — HEPARIN SODIUM (PORCINE) LOCK FLUSH IV SOLN 100 UNIT/ML 100 UNIT/ML
5 SOLUTION INTRAVENOUS
Status: DISCONTINUED | OUTPATIENT
Start: 2017-08-01 | End: 2017-08-01 | Stop reason: HOSPADM

## 2017-08-01 RX ORDER — SIMETHICONE 80 MG
80 TABLET,CHEWABLE ORAL EVERY 6 HOURS PRN
Qty: 180 TABLET | Refills: 1 | Status: SHIPPED | OUTPATIENT
Start: 2017-08-01 | End: 2017-12-29

## 2017-08-01 RX ORDER — HEPARIN SODIUM,PORCINE 10 UNIT/ML
5-10 VIAL (ML) INTRAVENOUS EVERY 24 HOURS
Status: DISCONTINUED | OUTPATIENT
Start: 2017-08-01 | End: 2017-08-01 | Stop reason: HOSPADM

## 2017-08-01 RX ORDER — HEPARIN SODIUM,PORCINE 10 UNIT/ML
5-10 VIAL (ML) INTRAVENOUS
Status: DISCONTINUED | OUTPATIENT
Start: 2017-08-01 | End: 2017-08-01 | Stop reason: HOSPADM

## 2017-08-01 RX ORDER — LIDOCAINE 40 MG/G
CREAM TOPICAL
Status: DISCONTINUED | OUTPATIENT
Start: 2017-08-01 | End: 2017-08-01 | Stop reason: HOSPADM

## 2017-08-01 RX ADMIN — MULTIPLE VITAMINS W/ MINERALS TAB 1 TABLET: TAB at 09:16

## 2017-08-01 RX ADMIN — SODIUM CHLORIDE, PRESERVATIVE FREE 5 ML: 5 INJECTION INTRAVENOUS at 15:09

## 2017-08-01 RX ADMIN — LEVOTHYROXINE SODIUM 88 MCG: 88 TABLET ORAL at 09:16

## 2017-08-01 RX ADMIN — OMEPRAZOLE 40 MG: 20 CAPSULE, DELAYED RELEASE ORAL at 09:16

## 2017-08-01 RX ADMIN — GLIPIZIDE 10 MG: 10 TABLET, FILM COATED, EXTENDED RELEASE ORAL at 09:16

## 2017-08-01 RX ADMIN — Medication 100 MG: at 09:15

## 2017-08-01 RX ADMIN — LACTULOSE 5 G: 20 SOLUTION ORAL at 09:16

## 2017-08-01 RX ADMIN — PRAVASTATIN SODIUM 80 MG: 20 TABLET ORAL at 09:16

## 2017-08-01 RX ADMIN — INSULIN ASPART 4 UNITS: 100 INJECTION, SOLUTION INTRAVENOUS; SUBCUTANEOUS at 14:13

## 2017-08-01 RX ADMIN — EXTENDED PHENYTOIN SODIUM 120 MG: 30 CAPSULE ORAL at 09:17

## 2017-08-01 RX ADMIN — INSULIN ASPART 2 UNITS: 100 INJECTION, SOLUTION INTRAVENOUS; SUBCUTANEOUS at 09:14

## 2017-08-01 RX ADMIN — SIMETHICONE CHEW TAB 80 MG 80 MG: 80 TABLET ORAL at 12:08

## 2017-08-01 RX ADMIN — VITAMIN D, TAB 1000IU (100/BT) 1000 UNITS: 25 TAB at 09:16

## 2017-08-01 RX ADMIN — HYDROXYZINE HYDROCHLORIDE 25 MG: 25 TABLET ORAL at 09:16

## 2017-08-01 RX ADMIN — CYANOCOBALAMIN TAB 1000 MCG 1000 MCG: 1000 TAB at 09:15

## 2017-08-01 RX ADMIN — OYSTER SHELL CALCIUM WITH VITAMIN D 1 TABLET: 500; 200 TABLET, FILM COATED ORAL at 09:16

## 2017-08-01 RX ADMIN — FERROUS SULFATE TAB 325 MG (65 MG ELEMENTAL FE) 325 MG: 325 (65 FE) TAB at 09:16

## 2017-08-01 RX ADMIN — RIFAXIMIN 550 MG: 550 TABLET ORAL at 09:16

## 2017-08-01 NOTE — PHARMACY-ADMISSION MEDICATION HISTORY
Admission medication history interview status for the 7/29/2017 admission is complete. See Epic admission navigator for allergy information, pharmacy, prior to admission medications and immunization status.     Medication history interview sources:  Patient, Patient's wife, Epic generated med list    Changes made to PTA medication list (reason):  Added: None  Deleted:   -Hypromellose (artificial tears) 0.4% soln: Place 2 drops into both eyes every 8 hours (per patient)   Changed: None    Additional medication history information (including reliability of information, actions taken by pharmacist):  -Spoke with patient and patient's wife at the time of consult. Patient's wife is in charge of medications and was a good historian at the time of consult.   -Patient administers Trulicity once weekly on Tuesday's.   -Patient received influenza vaccination on 10/13/2016.   -Patient's home pharmacy is [facility] in [place] (PH: [phone])    Prior to Admission medications    Medication Sig Last Dose Taking? Auth Provider   cyanocobalamin 1000 MCG TABS 1,000 mcg by mouth daily 7/27/2017 at AM Yes King Louis MD   cholecalciferol (VITAMIN  -D) 1000 UNITS capsule Take 1 capsule (1,000 Units) by mouth daily 7/27/2017 at AM Yes Beatriz Tanner MD   dulaglutide (TRULICITY) 0.75 MG/0.5ML pen Inject 0.75 mg Subcutaneous every 7 days 7/25/2017 Yes King Louis MD   gabapentin (NEURONTIN) 100 MG capsule Take 1 capsule (100 mg) by mouth At Bedtime 7/27/2017 at HS Yes King Louis MD   traZODone (DESYREL) 50 MG tablet Take 1.5 tablets (75 mg) by mouth nightly as needed for sleep 7/27/2017 at HS Yes King Louis MD   mirtazapine (REMERON) 15 MG tablet Take 1 tablet (15 mg) at bedtime. 7/27/2017 at HS Yes Kwesi Pena MD   HYDROcodone-acetaminophen (NORCO) 5-325 MG per tablet Take 2 tablets by mouth every 6 hours as needed for moderate to severe pain 7/27/2017 at PRN Yes Missy  King IRVIN MD   thiamine 100 MG tablet Take 1 tablet (100 mg) by mouth daily 7/27/2017 at AM Yes King Louis MD   glipiZIDE (GLUCOTROL XL) 10 MG 24 hr tablet Take 1 tablet (10 mg) by mouth daily 7/27/2017 Yes King Louis MD   pravastatin (PRAVACHOL) 80 MG tablet Take 1 tablet (80 mg) by mouth daily 7/27/2017 at PM Yes King Louis MD   hydrOXYzine (ATARAX) 25 MG tablet Take 1 tablet (25 mg) by mouth 2 times daily 7/27/2017 Yes King Louis MD   rifaximin (XIFAXAN) 550 MG TABS tablet Take 1 tablet (550 mg) by mouth 2 times daily 7/27/2017 at PM Yes Beatriz Tanner MD   levothyroxine (SYNTHROID/LEVOTHROID) 88 MCG tablet Take 1 tablet (88 mcg) by mouth daily 7/27/2017 Yes King Louis MD   omeprazole (PRILOSEC) 20 MG CR capsule Take 2 capsules (40 mg) by mouth 2 times daily 7/27/2017 at PM Yes King Louis MD   ferrous sulfate (IRON) 325 (65 FE) MG tablet Take 1 tablet (325 mg) by mouth 2 times daily 7/27/2017 at PM Yes Trevon Henson MD   sulfamethoxazole-trimethoprim (BACTRIM DS,SEPTRA DS) 800-160 MG per tablet Take 1 tablet by mouth 2 times daily 7/27/2017 at AM Yes King Louis MD   Calcium Carb-Cholecalciferol (CALCIUM 500 +D) 500-400 MG-UNIT TABS Take 500 mg by mouth daily 7/27/2017 at AM Yes Beatriz Tanner MD   multivitamin, therapeutic with minerals (THERA-VIT-M) TABS Take 1 tablet by mouth 2 times daily 7/27/2017 at PM Yes Reported, Patient   melatonin 5 MG tablet Take 5 mg by mouth nightly as needed for sleep 7/27/2017 at HS Yes Reported, Patient   blood glucose monitoring (ONE TOUCH ULTRA) test strip Use to test blood sugars 4 times daily as needed or as directed.   King Louis MD   ONE TOUCH LANCETS MISC by In Vitro route 4 times daily as needed   OfstedaKing linares MD       Medication history completed by:   Mary Schwarz, PharmD Student

## 2017-08-01 NOTE — PLAN OF CARE
Assumed cares at 2300. Alert and oriented to self and place. Pt can be forgetful when it comes to date and month questions. Denies pain.  during night. Pt slept most of shift. R chest port- No blood draws from port per order. Scheduled Lactulose- Goal 4-5 BM daily. < 2400 mg NA and no caffeine diet. Bed alarm on during night for pt safety, however pt called approp during night. Distended abd. P: Cont with POC. Monitor BG levels. Poss D/c home with wife today. Call light within reach. Will continue to monitor.

## 2017-08-01 NOTE — DISCHARGE INSTRUCTIONS
- Please start new medications simethicone and use lower dose of lactulose. It is okay to skip one dose of lactulose if you have more than 5 bowel movements in one day.  - We have placed referrals for health psychology and neuropsychiatry testing. You should be called to schedule neuropsychiatry testing. You have to call to schedule an appointment with Health Psychology, the number is [phone]  - Please see your primary provider within a week of discharging from the hospital, you should be called to schedule this (you can also call Dr. Avila's office to schedule if that is easier).  - We need to check a dilantin level this week, this is ordered for you and you can go to the lab and get a blood draw on 8/4/17. You should get a call from Dr. English's office regarding the result of this lab (the result will be sent to him).  - Weight yourself every other day. If you are consistently gaining weight over the course of 3 or more days, please contact your primary provider.

## 2017-08-01 NOTE — PROGRESS NOTES
Care Coordinator- Discharge Planning     Admission Date/Time:  7/29/2017  Attending MD:  Toni Patioñ MD     Data  Date of initial CC assessment:  7/31/17  Chart reviewed, discussed with interdisciplinary team.   Patient was admitted for:   1. Hx of psychiatric care    2. Hepatic encephalopathy (H)    3. Portal vein thrombosis    4. End-stage liver disease (H)    5. Moderate major depression (H)    6. Attention deficit hyperactivity disorder (ADHD), unspecified ADHD type    7. Astrocytoma (H)         Assessment  Full assessment completed in previous note    Coordination of Care and Referrals: Provided patient/family with options for discharge.  Patient medically ready to discharge home today. Per patient and wife request, PT and OT services added to [facility] orders. Wife to provide transportation.      Plan  Anticipated Discharge Date:  Today  Anticipated Discharge Plan:  Home with home care        Kyleigh Aburto RN

## 2017-08-01 NOTE — PROGRESS NOTES
[facility] now requests orders and shares plan of care/discharge summaries for some patients through [employer].  Please REPLY TO THIS MESSAGE in order to give authorization for orders when needed.  This is considered a verbal order, you will still receive a faxed copy of orders for signature.  Thank you for your assistance in improving collaboration for our patients.    ORDER Skilled nursing weekly with 3 PRNs for change in patient condition or recertification    MD SUMMARY/PLAN OF CARE  Situation Patient alert and oriented however wife does report that patient is less alert than normal in insight and judgement have also been lacking.  VSS, lungs CTA.  Abdominal girth 108 cm today with distended abdomen noted.  Patient denies pain associated with this today however has in the last week reported some  pain in abdomen. He did have an US last week that showed a nonocclusive portal vein thrombus in which patient was supposed to be seen in the ED and because patient is his own guardian he refused to go.  Patient does have an appointment later this week and will readdresss with primary care. Weight increase also noted today with 7 lb increase from one week ago.  PORT needs to be flushed, encouraged wife to remind patient to have PORT flushed when going in clinic. SKin CDI, feet assessed with no concerns.  Blood sugars 149 to 365 in the last 2 weeks. Ate once yesterday and instructed patient to eat 3 times per day. Appetite has been fair.  Occasionally has headaches, last one 3 days ago.  Ambulating without assistance however unsteady on his feet and use of walls and furniture for maintaining balance.    Background Hx of type 2 diabetes without mention of complication, heptatic encephalopathy, partial epilepsy with impairment of consciousness, major depressive disorder reccurent episode/moderate, sleep disturbances, generalized pain, BLE edema, venous thromboembolism with SMV and portal vein occlusion on  Lovenox injections  Analysis Pt is at risk for rehospitalization d/t high risk medications, risk for falls, chronic comorbidities  Recommedation SN for medication management/education, chronic disease management/education, weight checks, compliance with BG monitoring, abdominal girth measurements, fall assessment, home safety  Expected length of home care is ongoing for assess/educatin of chronic illnesses and medication mngmt with set up.

## 2017-08-01 NOTE — PLAN OF CARE
Problem: Goal Outcome Summary  Goal: Goal Outcome Summary  Outcome: No Change  VSS. A&Ox3; forgetful at times but calls appropriately. Ordered take out pizza for dinner. Reporting diarrhea/ 2 BM's, unseen by staff. BG checks 291 & 349. PRN simethicone x1. Wife at bedside for much of evening. Plan for tentative d/c tomorrow.

## 2017-08-01 NOTE — PLAN OF CARE
Problem: Goal Outcome Summary  Goal: Goal Outcome Summary  Outcome: Adequate for Discharge  A&O to self and place. Pt can be forgetful when it comes to date and month questions. Denies pain. R chest port de-accessed by writer. Reviewed d/c instructions w/ pt and caregiver, questions answered, CG will have preferred pharmacy request scripts. All belongings gathered. WC provided. Pt will go home w/ wife/CG.

## 2017-08-01 NOTE — DISCHARGE SUMMARY
[facility]   Date of Service: 7/31/2017    Mono Varghese MRN# [id number]   YOB: 1970 Age: 46 year old     Date of Admission:  7/29/2017  Date of Discharge:  8/1/2017  4:00 PM  Admitting Physician:  Toni Patiño MD  Discharge Physician:  Dr. Celsa Gregg   Discharging Service:  Internal Medicine, [facility] 1     Primary Provider: King Louis         Reason for Admission:   Mr. Varghese is a 46 year old male with a history of cirrhosis, left parietal astrocytoma s/p partial resection, chemo, and radiation complicated by seizures who presented on 7/29 with 1.5 weeks increasing confusion and 4 days of abdominal pain and increased abdominal girth. Found to have likely hepatic encephalopathy          Discharge Diagnosis:   # Hepatic encephalopathy  # Alcoholic liver cirrhosis  # Hx of seizures   # Hypothyroidism  # Type 2 DM  # Pancytopenia  # Depression         Procedures & Significant Findings:   - 7/29 KUB: no acute findings  - 7/29 CT head: no acute findings  - 7/29 ABD US: no acute findings, mild ascites   - 7/30 EEG: diffuse encephalopathy         Consultations:   Neurology  Psychiatry         Hospital Course by Problem:    # Altered Mental Status 2/2 Hepatic encephalopathy  Etiology not entirely clear, but seems most c/w hepatic encephalopathy in the setting of vulnerable brain (astrocytoma history). EEG showing diffuse encephalopathy also c/w HE. No obvious seizure activity, however dilantin level was low. Abd U/S showed minimal ascites and stable partially occluding portal venous thrombus. CT head negative for acute intracranial hemorrhage and or ischemic stroke. Negative sepsis work-up  Pt improving with lactulose therapy. Notably patient's MOCA is 10/30 and there is some concern about patient's outbursts at home.   - Lactulose 5g BID, goal 4 soft stools daily  - Trial PRN simethicone for abd distension, possible gas build-up with  "lactulose  - Referral to health psychology, neuropsychology, and f/u with outpatient pscyhiatry  - Continue Rifaximin    # Alcoholic liver cirrhosis  No acute issues in house  - F/u with hepatology as outpatient  - Daily weights    # Hx of seizures   Dilantin level low, so increased per neurology. No seizure activity on EEG  - Continue increased Dilantin Dose: 120mg bid  - Check Dilantin levels on 8/4, neurology to f/u    # Hypothyroidism  TSH elevated, normal T4. TSH elevation unclear significance given acute illness  - Recheck TSH in 4 weeks  - Continue PTA levothyroxine dose of 88mcg daily    # Type 2 DM  - Resume home meds    # Pancytopenia  Chronically low, at baseline. Etiology potentially from alcoholic myelosuppression, hx of chemotherapy  - Cont multivitamins, B12/Fe      Physical Exam on day of Discharge:  Vital signs:  Temp: 97  F (36.1  C) Temp src: Oral BP: 125/73 Pulse: 70   Resp: 16 SpO2: 93 % O2 Device: None (Room air)     Weight: 95.7 kg (211 lb)  Estimated body mass index is 30.28 kg/(m^2) as calculated from the following:    Height as of 7/18/17: 1.778 m (5' 10\").    Weight as of this encounter: 95.7 kg (211 lb).    Constitutional: Appears well, stable  Eyes: EOMI  Respiratory: CTAB  Cardiovascular: normal S1/S2, RRR  GI: distended abdomen, soft, non-tender  Skin: No visible telangiectasias or palmar erythema or jaundice  Neurologic: alert and oriented  Ext: trace b/l LE edema            Pending Results:   None         Discharge Medications:     Discharge Medication List as of 8/1/2017  2:20 PM      START taking these medications    Details   simethicone (MYLICON) 80 MG chewable tablet Take 1 tablet (80 mg) by mouth every 6 hours as needed for cramping, Disp-180 tablet, R-1, Local Print         CONTINUE these medications which have CHANGED    Details   phenytoin (DILANTIN) 30 MG CR capsule Take 4 capsules (120 mg) by mouth 2 times daily, Disp-240 capsule, R-1, Local Print      lactulose " (CHRONULAC) 10 GM/15ML solution Take 7.5 mLs (5 g) by mouth 2 times daily, Disp-473 mL, R-1, Local Print         CONTINUE these medications which have NOT CHANGED    Details   cyanocobalamin 1000 MCG TABS 1,000 mcg by Per G Tube route daily, Disp-30 tablet, R-0, E-Prescribelabs due for further refills      cholecalciferol (VITAMIN  -D) 1000 UNITS capsule Take 1 capsule (1,000 Units) by mouth daily, Disp-90 capsule, R-1, E-Prescribe      dulaglutide (TRULICITY) 0.75 MG/0.5ML pen Inject 0.75 mg Subcutaneous every 7 days, Disp-2 mL, R-3, E-Prescribe      gabapentin (NEURONTIN) 100 MG capsule Take 1 capsule (100 mg) by mouth At Bedtime, Disp-30 capsule, R-3, E-Prescribe      traZODone (DESYREL) 50 MG tablet Take 1.5 tablets (75 mg) by mouth nightly as needed for sleep, Disp-45 tablet, R-3, E-Prescribe      mirtazapine (REMERON) 15 MG tablet Take 1 tablet (15 mg) at bedtime., Disp-30 tablet, R-0, E-PrescribeCal clinic to schedule follow up appointment.      HYDROcodone-acetaminophen (NORCO) 5-325 MG per tablet Take 2 tablets by mouth every 6 hours as needed for moderate to severe pain, Disp-60 tablet, R-0, Local Print      thiamine 100 MG tablet Take 1 tablet (100 mg) by mouth daily, Disp-100 tablet, R-1, E-Prescribe      glipiZIDE (GLUCOTROL XL) 10 MG 24 hr tablet Take 1 tablet (10 mg) by mouth daily, Disp-90 tablet, R-1, E-Prescribe      pravastatin (PRAVACHOL) 80 MG tablet Take 1 tablet (80 mg) by mouth daily, Disp-90 tablet, R-1, E-Prescribe      hydrOXYzine (ATARAX) 25 MG tablet Take 1 tablet (25 mg) by mouth 2 times daily, Disp-180 tablet, R-1, E-Prescribe      rifaximin (XIFAXAN) 550 MG TABS tablet Take 1 tablet (550 mg) by mouth 2 times daily, Disp-60 tablet, R-11, E-Prescribe      levothyroxine (SYNTHROID/LEVOTHROID) 88 MCG tablet Take 1 tablet (88 mcg) by mouth daily, Disp-90 tablet, R-3, E-Prescribe      omeprazole (PRILOSEC) 20 MG CR capsule Take 2 capsules (40 mg) by mouth 2 times daily, Disp-120 capsule,  R-11, E-Prescribe      ferrous sulfate (IRON) 325 (65 FE) MG tablet Take 1 tablet (325 mg) by mouth 2 times daily, Disp-200 tablet, R-3, E-Prescribe      sulfamethoxazole-trimethoprim (BACTRIM DS,SEPTRA DS) 800-160 MG per tablet Take 1 tablet by mouth 2 times daily, Disp-60 tablet, R-5, E-Prescribe      Calcium Carb-Cholecalciferol (CALCIUM 500 +D) 500-400 MG-UNIT TABS Take 500 mg by mouth daily, Disp-90 tablet, R-1, E-Prescribe      multivitamin, therapeutic with minerals (THERA-VIT-M) TABS Take 1 tablet by mouth 2 times daily, Historical      melatonin 5 MG tablet Take 5 mg by mouth nightly as needed for sleep, Historical      blood glucose monitoring (ONE TOUCH ULTRA) test strip Use to test blood sugars 4 times daily as needed or as directed., Disp-300 each, R-4, E-Prescribe      hypromellose (ARTIFICIAL TEARS) 0.4 % SOLN Place 2 drops into both eyes every 8 hours, Disp-1 Bottle, R-11, E-Prescribe      ONE TOUCH LANCETS MISC by In Vitro route 4 times daily as needed, Disp-100 each, R-prn, FaxAs of January 1 pt's insurance will only cover onetouch or accu check.  Patient needs a  new glucometer (one touch), too                  Discharge Instructions and Follow-Up:     Discharge Procedure Orders  Phenytoin level   Standing Status: Future  Standing Exp. Date: 09/30/17     Medication Therapy Management Referral   Referral Type: Med Therapy Management     Home care nursing referral   Referral Type: Home Health Therapies & Aides     Home Care Social Service Referral for Hospital Discharge     [facility] Health Psychology Referral   Referral Type: Mental Health Outpatient     NEUROPSYCHOLOGY REFERRAL   Referral Type: Mental Health Outpatient     Home Care PT Referral for Hospital Discharge   Referral Type: Home Health Therapies & Aides     Home Care OT Referral for Hospital Discharge     MD face to face encounter   Order Comments: Documentation of Face to Face and Certification for Home Health Services    I certify that  patient: Mono Varghese is under my care and that I, or a nurse practitioner or physician's assistant working with me, had a face-to-face encounter that meets the physician face-to-face encounter requirements with this patient on: 7/31/2017.    This encounter with the patient was in whole, or in part, for the following medical condition, which is the primary reason for home health care: complex medical status.    I certify that, based on my findings, the following services are medically necessary home health services: Nursing.    My clinical findings support the need for the above services because: Nurse is needed: assessment and treatment, VS, medication management.    Further, I certify that my clinical findings support that this patient is homebound (i.e. absences from home require considerable and taxing effort and are for medical reasons or [facility] services or infrequently or of short duration when for other reasons) because: Requires assistance of another person or specialized equipment to access medical services because patient: Requires supervision of another for safe transfer...    Based on the above findings. I certify that this patient is confined to the home and needs intermittent skilled nursing care, physical therapy and/or speech therapy.  The patient is under my care, and I have initiated the establishment of the plan of care.  This patient will be followed by a physician who will periodically review the plan of care.  Physician/Provider to provide follow up care: King Louis    [facility] physician (the Medicare certified [place] provider): Dr. Toni Patiño  Physician Signature: See electronic signature associated with these discharge orders.  Date: 7/31/2017     Adult [facility] Follow-up and recommended labs and tests   Order Comments: Follow up with primary care provider, King Louis, within 7 days for hospital follow- up.  Follow up with oncology as needed, has visit set  for next spring  Follow up with Neuropsychology for neuropsych testing evaluation within a month.  Follow up with [facility] Health Psychology within 3 weeks.     Appointments on [place] and/or [facility] (with [facility] or [facility] provider or service). Call [phone] if you haven't heard regarding these appointments within 7 days of discharge.     Reason for your hospital stay   Order Comments: Altered mental status from hepatic encephalopathy     Follow Up and recommended labs and tests   Order Comments: Phenytoin level in 3-5 days     Activity   Order Comments: Your activity upon discharge: activity as tolerated   Order Specific Question Answer Comments   Is discharge order? Yes      Monitor and record   Order Comments: fluid intake and output daily  Limit intake to 2 liters per day   weight every other day     Discharge Instructions   Order Comments: - Please start new medications simethicone and use lower dose of lactulose. It is okay to skip one dose of lactulose if you have more than 5 bowel movements in one day.  - We have placed referrals for health psychology and neuropsychiatry testing. You should be called to schedule neuropsychiatry testing. You have to call to schedule an appointment with health psychology, the number is [phone]  - See your primary provider within a week of discharging from the hospital, you should be called to schedule this (you can also call Dr. Avila's office to schedule if that is easier).  - We need to check a dilantin level this week, this is ordered for you and you can go to the lab and get a blood draw on 8/4/17. You should get a call from Dr. English's office regarding the result of this lab (the result will be sent to him).  - Weight yourself every other day. If you are consistently gaining weight over the course of 3 or more days, please contact your primary provider.  - You can skip a lactulose dose if you have more than 5 bowel movements in a 24 hour period     DNR/DNI      Diet   Order Comments: Follow this diet upon discharge: Orders Placed This Encounter     Combination Diet Low Saturated Fat Na <2400mg Diet, No Caffeine Diet   Order Specific Question Answer Comments   Is discharge order? Yes                  Discharge Disposition:   Home         Condition on Discharge:   Discharge condition: Stable   Code status on discharge: DNR / DNI        Date of service: 8/1/17    30 minutes spent in discharge, including >50% in counseling and coordination of care, medication review and plan of care recommended on follow up.     Patient staffed with Dr. Gregg

## 2017-08-02 ENCOUNTER — TELEPHONE (OUTPATIENT)
Dept: PHARMACY | Facility: OTHER | Age: 47
End: 2017-08-02

## 2017-08-02 ENCOUNTER — CARE COORDINATION (OUTPATIENT)
Dept: CARE COORDINATION | Facility: CLINIC | Age: 47
End: 2017-08-02

## 2017-08-02 ENCOUNTER — MYC REFILL (OUTPATIENT)
Dept: FAMILY MEDICINE | Facility: CLINIC | Age: 47
End: 2017-08-02

## 2017-08-02 DIAGNOSIS — D49.6 BRAIN TUMOR (H): ICD-10-CM

## 2017-08-02 RX ORDER — HYDROCODONE BITARTRATE AND ACETAMINOPHEN 5; 325 MG/1; MG/1
2 TABLET ORAL EVERY 6 HOURS PRN
Qty: 60 TABLET | Refills: 0 | Status: SHIPPED | OUTPATIENT
Start: 2017-08-02 | End: 2017-08-31

## 2017-08-02 NOTE — TELEPHONE ENCOUNTER
MTM referral from: Transitions of Care (recent hospital discharge or ED visit)    MTM referral outreach attempt #1 on August 2, 2017 at 10:16 AM      Outcome: Left Message    Sehlly Lott MTM Coordinator

## 2017-08-02 NOTE — TELEPHONE ENCOUNTER
Date last refill was ordered:6/16/17  Quantity ordered:60  Number of refills:0  Date of last clinic visit:7/6/17  Date of next clinic visit:None     8/3/17 RX mailed to patients pharmacy.  Melida Mehta RN

## 2017-08-02 NOTE — PLAN OF CARE
Problem: Goal Outcome Summary  Goal: Goal Outcome Summary  Occupational Therapy Discharge Summary     Reason for therapy discharge:    Discharged to home with home therapy.     Progress towards therapy goal(s). See goals on Care Plan in [facility] electronic health record for goal details.  Goals partially met.  Barriers to achieving goals:   discharge from facility.     Therapy recommendation(s):    Continued therapy is recommended.  Rationale/Recommendations:  Increase safety and IND in ADLs..

## 2017-08-02 NOTE — TELEPHONE ENCOUNTER
Message from [employer]:  Original authorizing provider: MD Mono Cervantes would like a refill of the following medications:  HYDROcodone-acetaminophen (NORCO) 5-325 MG per tablet [King Louis MD]    Preferred pharmacy: [address] [address] [facility]    Comment:

## 2017-08-02 NOTE — PROGRESS NOTES
"[facility]  \"Hello, my name is Lucina Romano , and I am calling from the [facility].  I want to check in and see how you are doing, after leaving the hospital.  You may also receive a call from your Care Coordinator (care team), but I want to make sure you don t have any urgent needs.  I have a couple questions to review with you:     Post-Discharge Outreach                                                    Mono Varghese is a 46 year old male     Follow-up Appointments           Adult [facility] Follow-up and recommended labs and tests         Follow up with primary care provider, King Louis, within 7 days for hospital follow- up.  Follow up with oncology as needed, has visit set for next spring  Follow up with Neuropsychology for neuropsych testing evaluation within a month.  Follow up with [facility] Health Psychology within 3 weeks.      Appointments on [place] and/or [facility] (with [facility] or [facility] provider or service). Call [phone] if you haven't heard regarding these appointments within 7 days of discharge.               Follow Up and recommended labs and tests         Phenytoin level in 3-5 days                        Your next 10 appointments already scheduled      Oct 18, 2017  9:00 AM CDT   Lab with  LAB   [facility] ([facility])     [address] [place] [id number] [phone]                  Oct 18, 2017  9:50 AM CDT   (Arrive by 9:35 AM)   Return General Liver with Beatriz Tanner MD   [facility] Hepatology ([facility])     [address] [place] [address]  3rd [facility] [id number] [phone]                  Oct 19, 2017  1:30 PM CDT   (Arrive by 1:15 PM)   Return Seizure with Anil English MD   [facility] Neurology ([facility])     [address] [id number] [phone]                  Apr " 27, 2018 11:30 AM CDT   (Arrive by 11:15 AM)   MR BRAIN W/O & W CONTRAST with [facility] ([facility])     [address] [facility] [address]  1st Floor  [facility] [address] [phone]              Care Team:    Patient Care Team       Relationship Specialty Notifications Start End    King Louis MD PCP - General Family Practice  10/15/12     Phone: [phone] Fax: [phone]         [facility] [address] [facility] [address] [place] [id number]    Jihan Greer, RN Nurse Coordinator Oncology Admissions 9/12/14     Comment:  ph:  [phone], pgr:  [phone]    Phone: [phone] Pager: [phone]        Lauro Shaw MD MD Oncology Admissions 9/12/14     Phone: [phone] Fax: [phone]         [facility] [address] [facility] [address] [facility] [address]    Gume Hall MD MD Family Practice  6/10/15     Phone: [phone] Fax: [phone]         [address] [facility] [address]    King Louis MD MD Family Practice  6/19/15     Comment:  referring to Hepatology    Phone: [phone] Fax: [phone]         PRIMARY CARE [place] [address] [facility] [address] [facility] [address]    Elizabet Darling MD Resident Student in organized health care education/training program  6/19/15     Phone: [phone] Fax: [phone]         [facility] [address] [facility] [address]    Al Gil MD MD Ophthalmology  6/22/15     Phone: [phone] Fax: [phone]         [facility] [address] [facility] [address] [facility] [address]    Kristen Hernández MD MD Ophthalmology  6/22/15     Phone: [phone] Fax: [phone]         [facility] [address] [facility] [address]    Jacobo Rene MD MD Urology  7/21/15     Phone: [phone] Fax: [phone]         XX RESIGNED [address] [facility] [address]    Kwesi Pena MD Resident Psychiatry Admissions 9/21/16     Phone:  [phone] Fax: [phone]         [address] [facility] [facility] [address]    An Magana MD MD Psychiatry  9/26/16     Comment:  supervising Dr. Pena    Phone: [phone] Fax: [phone]         [address] [address]    Blair Dolan, RN Registered Nurse  Admissions 6/16/17     Comment:  Epilepsy    [phone]            Transition of Care Review                                                      Patient was called three times and no answer so post 24 hr DC follow up calls will be closed out                   Plan                                                      Thanks for your time.  Your Care Coordinator may follow-up within the next couple days.  In the meantime if you have questions, concerns or problems call your care team.        Lucina Romano

## 2017-08-03 ENCOUNTER — DOCUMENTATION ONLY (OUTPATIENT)
Dept: CARE COORDINATION | Facility: CLINIC | Age: 47
End: 2017-08-03

## 2017-08-04 LAB
BACTERIA SPEC CULT: NO GROWTH
BACTERIA SPEC CULT: NO GROWTH
MICRO REPORT STATUS: NORMAL
MICRO REPORT STATUS: NORMAL
SPECIMEN SOURCE: NORMAL
SPECIMEN SOURCE: NORMAL

## 2017-08-05 LAB
BACTERIA SPEC CULT: NO GROWTH
MICRO REPORT STATUS: NORMAL
SPECIMEN SOURCE: NORMAL

## 2017-08-07 ENCOUNTER — DOCUMENTATION ONLY (OUTPATIENT)
Dept: CARE COORDINATION | Facility: CLINIC | Age: 47
End: 2017-08-07

## 2017-08-07 NOTE — PROGRESS NOTES
[facility] utilizes an encounter to take the place of a direct phone call to your office. Please take a moment to review the below request. Your reply to this encounter will act as a verbal OK of orders if requested below. Thank you.    ORDER  Pt seen for home PT radha today 8/7/17, pt appropriate for continued home PT 1w1 for home program instruction and home safety.  Ruben Whittington PT  [phone]

## 2017-08-08 ENCOUNTER — OFFICE VISIT (OUTPATIENT)
Dept: FAMILY MEDICINE | Facility: CLINIC | Age: 47
End: 2017-08-08

## 2017-08-08 ENCOUNTER — ALLIED HEALTH/NURSE VISIT (OUTPATIENT)
Dept: PHARMACY | Facility: CLINIC | Age: 47
End: 2017-08-08
Payer: MEDICAID

## 2017-08-08 VITALS
WEIGHT: 210.9 LBS | DIASTOLIC BLOOD PRESSURE: 88 MMHG | SYSTOLIC BLOOD PRESSURE: 126 MMHG | HEART RATE: 65 BPM | BODY MASS INDEX: 30.26 KG/M2 | RESPIRATION RATE: 20 BRPM

## 2017-08-08 VITALS — WEIGHT: 211 LBS | BODY MASS INDEX: 30.28 KG/M2

## 2017-08-08 DIAGNOSIS — K76.82 ENCEPHALOPATHY, HEPATIC (H): Primary | ICD-10-CM

## 2017-08-08 DIAGNOSIS — K74.60 HEPATIC CIRRHOSIS, UNSPECIFIED HEPATIC CIRRHOSIS TYPE (H): ICD-10-CM

## 2017-08-08 DIAGNOSIS — G47.00 INSOMNIA, UNSPECIFIED TYPE: ICD-10-CM

## 2017-08-08 DIAGNOSIS — R40.0 SOMNOLENCE, DAYTIME: ICD-10-CM

## 2017-08-08 DIAGNOSIS — R56.9 SEIZURES (H): ICD-10-CM

## 2017-08-08 DIAGNOSIS — E03.9 HYPOTHYROIDISM, UNSPECIFIED TYPE: ICD-10-CM

## 2017-08-08 DIAGNOSIS — R51.9 NONINTRACTABLE EPISODIC HEADACHE, UNSPECIFIED HEADACHE TYPE: ICD-10-CM

## 2017-08-08 DIAGNOSIS — E78.5 HYPERLIPIDEMIA LDL GOAL <100: ICD-10-CM

## 2017-08-08 DIAGNOSIS — R53.83 OTHER FATIGUE: ICD-10-CM

## 2017-08-08 DIAGNOSIS — Z09 HOSPITAL DISCHARGE FOLLOW-UP: ICD-10-CM

## 2017-08-08 DIAGNOSIS — E11.9 DM TYPE 2, GOAL HBA1C 7%-8% (H): ICD-10-CM

## 2017-08-08 DIAGNOSIS — E11.9 TYPE 2 DIABETES MELLITUS WITHOUT COMPLICATION, WITHOUT LONG-TERM CURRENT USE OF INSULIN (H): ICD-10-CM

## 2017-08-08 DIAGNOSIS — R05.9 COUGH: ICD-10-CM

## 2017-08-08 DIAGNOSIS — G40.209 PARTIAL EPILEPSY WITH IMPAIRMENT OF CONSCIOUSNESS (H): ICD-10-CM

## 2017-08-08 DIAGNOSIS — K21.9 GASTROESOPHAGEAL REFLUX DISEASE WITHOUT ESOPHAGITIS: ICD-10-CM

## 2017-08-08 DIAGNOSIS — Z78.9 INPATIENT HOSPITALIZATION WITHIN LAST 30 DAYS: ICD-10-CM

## 2017-08-08 DIAGNOSIS — E11.9 DM TYPE 2, GOAL HBA1C 7%-8% (H): Primary | ICD-10-CM

## 2017-08-08 LAB
ALBUMIN SERPL-MCNC: 3.3 G/DL (ref 3.4–5)
ALP SERPL-CCNC: 203 U/L (ref 40–150)
ALT SERPL W P-5'-P-CCNC: 41 U/L (ref 0–70)
ANION GAP SERPL CALCULATED.3IONS-SCNC: 7 MMOL/L (ref 3–14)
AST SERPL W P-5'-P-CCNC: 51 U/L (ref 0–45)
BASOPHILS # BLD AUTO: 0 10E9/L (ref 0–0.2)
BASOPHILS NFR BLD AUTO: 0.5 %
BILIRUB SERPL-MCNC: 1.2 MG/DL (ref 0.2–1.3)
BUN SERPL-MCNC: 6 MG/DL (ref 7–30)
CALCIUM SERPL-MCNC: 8.4 MG/DL (ref 8.5–10.1)
CHLORIDE SERPL-SCNC: 105 MMOL/L (ref 94–109)
CO2 SERPL-SCNC: 25 MMOL/L (ref 20–32)
CREAT SERPL-MCNC: 0.86 MG/DL (ref 0.66–1.25)
DIFFERENTIAL METHOD BLD: ABNORMAL
EOSINOPHIL # BLD AUTO: 0.2 10E9/L (ref 0–0.7)
EOSINOPHIL NFR BLD AUTO: 8.6 %
ERYTHROCYTE [DISTWIDTH] IN BLOOD BY AUTOMATED COUNT: 15 % (ref 10–15)
GFR SERPL CREATININE-BSD FRML MDRD: ABNORMAL ML/MIN/1.7M2
GLUCOSE SERPL-MCNC: 223 MG/DL (ref 70–99)
HBA1C MFR BLD: 8.5 % (ref 4.3–6)
HCT VFR BLD AUTO: 38.7 % (ref 40–53)
HGB BLD-MCNC: 12.9 G/DL (ref 13.3–17.7)
IMM GRANULOCYTES # BLD: 0 10E9/L (ref 0–0.4)
IMM GRANULOCYTES NFR BLD: 0.5 %
LYMPHOCYTES # BLD AUTO: 0.4 10E9/L (ref 0.8–5.3)
LYMPHOCYTES NFR BLD AUTO: 20.5 %
MCH RBC QN AUTO: 32.3 PG (ref 26.5–33)
MCHC RBC AUTO-ENTMCNC: 33.3 G/DL (ref 31.5–36.5)
MCV RBC AUTO: 97 FL (ref 78–100)
MONOCYTES # BLD AUTO: 0.2 10E9/L (ref 0–1.3)
MONOCYTES NFR BLD AUTO: 9.7 %
NEUTROPHILS # BLD AUTO: 1.1 10E9/L (ref 1.6–8.3)
NEUTROPHILS NFR BLD AUTO: 60.2 %
NRBC # BLD AUTO: 0 10*3/UL
NRBC BLD AUTO-RTO: 0 /100
PHENYTOIN SERPL-MCNC: 7.1 MG/L (ref 10–20)
PLATELET # BLD AUTO: 39 10E9/L (ref 150–450)
POTASSIUM SERPL-SCNC: 4.2 MMOL/L (ref 3.4–5.3)
PROT SERPL-MCNC: 6.9 G/DL (ref 6.8–8.8)
RBC # BLD AUTO: 3.99 10E12/L (ref 4.4–5.9)
SODIUM SERPL-SCNC: 137 MMOL/L (ref 133–144)
T4 FREE SERPL-MCNC: 1.07 NG/DL (ref 0.76–1.46)
TSH SERPL DL<=0.005 MIU/L-ACNC: 7.01 MU/L (ref 0.4–4)
WBC # BLD AUTO: 1.9 10E9/L (ref 4–11)

## 2017-08-08 PROCEDURE — 99605 MTMS BY PHARM NP 15 MIN: CPT | Performed by: PHARMACIST

## 2017-08-08 PROCEDURE — 99607 MTMS BY PHARM ADDL 15 MIN: CPT | Performed by: PHARMACIST

## 2017-08-08 PROCEDURE — 80185 ASSAY OF PHENYTOIN TOTAL: CPT | Performed by: INTERNAL MEDICINE

## 2017-08-08 ASSESSMENT — PAIN SCALES - GENERAL: PAINLEVEL: NO PAIN (0)

## 2017-08-08 NOTE — NURSING NOTE
Chief Complaint   Patient presents with     Hospital F/U     pt is here for a hospital follow up       Rashmi Oakley CMA at 10:01 AM on 8/8/2017

## 2017-08-08 NOTE — PROGRESS NOTES
SUBJECTIVE/OBJECTIVE:                Mono Varghese is a 46 year old male coming in for a transitions of care visit.  He was discharged from [facility] on 08/01 for hepatic encephalopathy.     Chief Complaint: Concern about lactulose dosing in the ED and hospital. Pt is also wondering if any meds could cause sun sensitivity.    Allergies/ADRs: Reviewed in Epic  Tobacco: No tobacco use   Alcohol: not currently using    PMH: Reviewed in Epic    Medication Adherence: no issues reported    Cirrhosis with Hx of HE and SBP: Pt is feeling better today and wife reports he has been clear headed since hospitalization. Pt is taking Xifaxan and lactulose as directed. He is taking lactulose 5ml twice daily but generally only takes the morning dose because by evening he has 5-6 movements. Pt is taking hydroxyzine for itching and feels it is somewhat helpful. His wife notes less scratching when he is taking it. Pt is taking simethicone but has not noted much improvement in abdominal distention. Pt is taking thiamine as directed. Pt continues SMZ/TMP for SBP prophylaxis.    Pt's wife is asking today if there is anything we could put in pt's chart to prevent him from getting more than 10ml of lactulose in the ED should he end up there again. He was given a larger dose on arrival prior to hospitalization and this resulted in severeal episodes of uncontrollable diarrhea to the point that they were more frequent than nurse check-ins while they were in the ED and pt's wife needed to perform cares to keep pt comfortable.    Diabetes:  Pt currently taking Trulicity 0.75mg weekly and glipizide 10mg daily. Pt is not experiencing side effects.  SMBG: one time daily.   Ranges (patient reported): 100-200 fasting, rarely over 200. Pt does not test later in the day.  Patient is not experiencing hypoglycemia  Recent symptoms of high blood sugar? none    Insomnia/Drowsiness: Pt is taking mirtazapine 15mg, trazodone 50mg. Pt discontinued  melatonin so we removed that from the list today. Pt is wanting to know if any evening meds are worsening insomnia and also complains of daytime somnolence and is wondering what could be causing that. Evening meds include:  Dilantin 120mg  Simethicone 80mg  Gabapentin 100mg  Xifaxan 550mg  Iron 325mg  Prilosec 40mg    Headache: Pt takes Norco as needed. Pt took 1.5 weeks ago for intractable headache but has needed none since.    Seizures: Pt is taking Dilantin 120mg BID as directed without complaint of issues.    Hyperlipidemia: Current therapy includes pravastatin 80mg once daily.  Pt reports no significant myalgias or other side effects.     Hypothyroidism: Patient is taking levothyroxine 88 mcg daily. Patient is having the following symptoms: none.    GERD: Current medications include: Prilosec (omeprazole) 40mg twice daily. Patient feels that current regimen is effective.    Current labs include:  BP Readings from Last 3 Encounters:   08/08/17 126/88   08/01/17 125/73   07/28/17 132/79     Today's Vitals: There were no vitals taken for this visit.  Lab Results   Component Value Date    A1C 8.5 08/08/2017   .  Lab Results   Component Value Date    CHOL 201 07/20/2016     Lab Results   Component Value Date    TRIG 148 07/20/2016     Lab Results   Component Value Date    HDL 67 07/20/2016     Lab Results   Component Value Date     07/20/2016       Liver Function Studies -   Recent Labs   Lab Test  08/08/17   1120   PROTTOTAL  6.9   ALBUMIN  3.3*   BILITOTAL  1.2   ALKPHOS  203*   AST  51*   ALT  41       Lab Results   Component Value Date    UCRR 189 12/09/2016    MICROL 6 12/09/2016    UMALCR 3.12 12/09/2016       Last Basic Metabolic Panel:  Lab Results   Component Value Date     08/08/2017      Lab Results   Component Value Date    POTASSIUM 4.2 08/08/2017     Lab Results   Component Value Date    CHLORIDE 105 08/08/2017     Lab Results   Component Value Date    BUN 6 08/08/2017     Lab Results    Component Value Date    CR 0.86 08/08/2017     GFR Estimate   Date Value Ref Range Status   08/08/2017 >90  Non  GFR Calc   >60 mL/min/1.7m2 Final   07/31/2017 >90  Non  GFR Calc   >60 mL/min/1.7m2 Final   07/30/2017 89 >60 mL/min/1.7m2 Final     Comment:     Non  GFR Calc     GFR Estimate If Black   Date Value Ref Range Status   08/08/2017 >90   GFR Calc   >60 mL/min/1.7m2 Final   07/31/2017 >90   GFR Calc   >60 mL/min/1.7m2 Final   07/30/2017 >90   GFR Calc   >60 mL/min/1.7m2 Final     TSH   Date Value Ref Range Status   08/08/2017 7.01 (H) 0.40 - 4.00 mU/L Final   ]    Most Recent Immunizations   Administered Date(s) Administered     Influenza (IIV3) 10/13/2016     Pneumococcal 23 valent 09/18/2012     TDAP Vaccine (Boostrix) 10/14/2014       ASSESSMENT:                 Current medications were reviewed today.      Medication Adherence: no issues identified    Cirrhosis with Hx of HE and SBP: Stable. Patient care coordination note updated with instructions to ED team about lactulose orders.    Diabetes: Needs Improvement. Self monitoring of blood glucose is not at goal of fasting  mg/dL. Pt would benefit from GLP-1 agonist (Trulicity) :  increase dose to 1.5mg.    Insomnia/Drowsiness: Needs improvement. We discussed that cirrhosis can cause insomnia and that evening meds do not appear to have likely culprit that would be stimulating but that pt may reduce daytime somnolence by moving trazodone and mirtazapine to earlier in the evening by 1-2 hours. We also discussed that hydroxyzine is sedating and any possible dose reduction may help.    Headache: Stable.    Seizures: Improving. Phenytoin level not within goal 10-20 mg/L but trending up on recheck in clinic today. Pt to follow up with PCP or neurology for further guidance.    Hyperlipidemia: Stable. Pt is on moderate intensity statin which is indicated based  on 2013 ACC/AHA guidelines for lipid management.      Hypothyroidism: Stable. Last TSH is above normal range . However, last T4 was WNL.    GERD: Stable.  Current treatment is effective.    PLAN:                Post Discharge Medication Reconciliation Status: discharge medications reconciled, continue medications without change.    1. Pt's care note updated with guidance on lactulose dosing in ED.    2. Pt to move trazodone and mirtazapine up by 1-2 hours in the evening to reduce daytime drowsiness. Pt also to consult with hepatology about whether hydroxyzine could be dosed in 10mg tablets so lower dose could be tried.    3. Pt advised that glipizide, SMZ/TMP, trazodone all have low incidence of sun sensitivity but pt is fine in the sun if wearing hat and sunscreen.    4. I will discuss with Dr. Louis whether we can increase Trulicity.    I spent 60 minutes with this patient today  .  All changes were made via collaborative practice agreement with [place] A copy of the visit note was provided to the patient's primary care provider.    Will follow up in 2 weeks, sooner if needed.    The patient was given a summary of these recommendations as an after visit summary.    Felicia Torres PharmD  Medication Therapy Management Provider  Phone:[phone]  Pager: [phone]

## 2017-08-08 NOTE — PATIENT INSTRUCTIONS
Primary Care Center Phone Number [phone]  Primary Care Center Medication Refill Request Information:  * Please contact your pharmacy regarding ANY request for medication refills.  ** [facility] Prescription Fax = [phone]  * Please allow 3 business days for routine medication refills.  * Please allow 5 business days for controlled substance medication refills.     Primary Care Center Test Result notification information:  *You will be notified with in 7-10 days of your appointment day regarding the results of your test.  If you are on MyChart you will be notified as soon as the provider has reviewed the results and signed off on them.

## 2017-08-08 NOTE — MR AVS SNAPSHOT
"              After Visit Summary   8/8/2017    Mono Varghese    MRN: [id number]           Patient Information     Date Of Birth          1970        Visit Information        Provider Department      8/8/2017 1:00 PM Felicia Torres, [facility] MTM        Care Instructions    Recommendations from today's MTM visit:                                                        1. I will see if we can put a \"banner\" on his chart to let the ED know that he needs to not get more than 5-10 cc of lactulose on first dose when coming into the ED or hospital.    2. Your hydroxyzine, mirtazapine, and trazodone are all likely contributing to daytime drowsiness. Hydroxyzine comes as 10mg so you could talk to your doctor about trying a lower dose. You could also try moving mirtazapine and trazodone up by 1-2 hours at night.    3. Glipizide, Bactrim, and trazodone may cause some sun sensitivity. Just wear a hat and sunscreen!    4. I will talk to Dr. Louis about whether or not we could increase your Trulicity.    Next MTM visit: I will call next week on Wednesday at 11am to see how you are doing    To schedule another MTM appointment, please call the clinic directly or you may call the MTM scheduling line at [phone] or toll-free at [phone].     My Clinical Pharmacist's contact information:                                                      It was a pleasure seeing you today!  Please feel free to contact me with any questions or concerns you have.      Felicia Torres, PharmD  Medication Therapy Management Provider  Phone:[phone]  Pager: [phone]    You may receive a survey about the MTM services you received.  I would appreciate your feedback to help me serve you better in the future. Please fill it out and return it when you can. Your comments will be anonymous.                  Follow-ups after your visit        Your next 10 appointments already scheduled     Aug 16, 2017 11:00 " AM CDT   TELEMEDICINE with Felicia Torres RPH   [facility] Medication Therapy Management ([facility])    [address] [place] [address]   [phone]           Note: this is not an onsite visit; there is no need to come to the facility.            Aug 17, 2017  1:45 PM CDT   Adult Med Follow UP with Devendra Almanzar MD   Psychiatry Clinic ([facility])    [place] [id number]  [address] [facility] [address]   [phone]            Sep 06, 2017 12:35 PM CDT   (Arrive by 12:20 PM)   Return Visit with King Louis MD   [facility] ([facility])    [address] [place] [address]   [phone]            Oct 18, 2017  9:00 AM CDT   Lab with  LAB   [facility] Lab ([facility])    [address] [place] [address]   [phone]            Oct 18, 2017  9:50 AM CDT   (Arrive by 9:35 AM)   Return General Liver with Beatriz Tanner MD   [facility] Hepatology ([facility])    [address] [place] [address]   [phone]            Oct 19, 2017  1:30 PM CDT   (Arrive by 1:15 PM)   Return Seizure with Anil English MD   [facility] Neurology ([facility])    [address] [place] [address]   [phone]            Apr 27, 2018 11:30 AM CDT   (Arrive by 11:15 AM)   MR BRAIN W/O & W CONTRAST with [address] [facility] MRI ([facility])    [address] [place] [address]   [phone]           Take your medicines as usual, unless your doctor tells you not to. Bring a list of your current medicines to your exam (including vitamins, minerals and over-the-counter drugs).  You will be given intravenous contrast for this exam. To prepare:    The day before your exam, drink extra fluids at least six 8-ounce glasses (unless your doctor tells you to restrict your fluids).   Have a blood test (creatinine test) within 30 days of your exam. Go to your clinic or Diagnostic Imaging Department for this test.  The MRI machine uses a strong magnet. Please wear clothes without metal (snaps, zippers). A sweatsuit works well, or we may give you a hospital gown.  Please remove any body piercings and hair extensions before you arrive. You will also remove watches, jewelry, hairpins, wallets, dentures, partial dental plates and hearing aids. You may wear contact lenses, and you may be able to wear your rings. We have a safe place to keep your personal items, but it is safer to leave them at home.   **IMPORTANT** THE INSTRUCTIONS BELOW ARE ONLY FOR THOSE PATIENTS WHO HAVE BEEN TOLD THEY WILL RECEIVE SEDATION OR GENERAL ANESTHESIA DURING THEIR MRI PROCEDURE:  IF YOU WILL RECEIVE SEDATION (take medicine to help you relax during your exam):   You must get the medicine from your doctor before you arrive. Bring the medicine to the exam. Do not take it at home.   Arrive one hour early. Bring someone who can take you home after the test. Your medicine will make you sleepy. After the exam, you may not drive, take a bus or take a taxi by yourself.   No eating 8 hours before your exam. You may have clear liquids up until 4 hours before your exam. (Clear liquids include water, clear tea, black coffee and fruit juice without pulp.)  IF YOU WILL RECEIVE ANESTHESIA (be asleep for your exam):   Arrive 1 1/2 hours early. Bring someone who can take you home after the test. You may not drive, take a bus or take a taxi by yourself.   No eating 8 hours before your exam. You may have clear liquids up until 4 hours before your exam. (Clear liquids include water, clear tea, black coffee and fruit juice without pulp.)  Please call the Imaging Department at your exam site with any questions.             May 04, 2018 11:00 AM CDT   (Arrive by 10:45 AM)   Return Visit with Lauro Shaw MD   [facility] ([email or web address])    [address] [facility]  [address] [facility] [address] [phone]              Who to contact     If you have questions or need follow up information about today's clinic visit or your schedule please contact [facility] MTM directly at [phone].  Normal or non-critical lab and imaging results will be communicated to you by [employer], letter or phone within 4 business days after the clinic has received the results. If you do not hear from us within 7 days, please contact the clinic through [employer] or phone. If you have a critical or abnormal lab result, we will notify you by phone as soon as possible.  Submit refill requests through "[employer]" or call your pharmacy and they will forward the refill request to us. Please allow 3 business days for your refill to be completed.          Additional Information About Your Visit        [employer] Information     "[employer]" gives you secure access to your electronic health record. If you see a primary care provider, you can also send messages to your care team and make appointments. If you have questions, please call your primary care clinic.  If you do not have a primary care provider, please call [phone] and they will assist you.        Care EveryWhere ID     This is your Care EveryWhere ID. This could be used by other organizations to access your [place] medical records  [id number]        Your Vitals Were     BMI (Body Mass Index)                   30.28 kg/m2            Blood Pressure from Last 3 Encounters:   08/08/17 126/88   08/01/17 125/73   07/28/17 132/79    Weight from Last 3 Encounters:   08/08/17 211 lb (95.7 kg)   08/08/17 210 lb 14.4 oz (95.7 kg)   07/31/17 211 lb (95.7 kg)              Today, you had the following     No orders found for display         Today's  Medication Changes          These changes are accurate as of: 8/8/17  1:50 PM.  If you have any questions, ask your nurse or doctor.               These medicines have changed or have updated prescriptions.        Dose/Directions    cyanocobalamin 1000 MCG Tabs   This may have changed:  how to take this   Used for:  Alcoholic cirrhosis of liver with ascites (H)        Dose:  1000 mcg   1,000 mcg by Per G Tube route daily   Quantity:  30 tablet   Refills:  0                Primary Care Provider Office Phone # Fax #    [place] Louis [phone]       [facility] [address] [place] [id number]        Equal Access to Services     SAMUEL FAIR : Hadii aad ku hadasho Soomaali, waaxda luqadaha, qaybta kaalmada adeegyada, josue roque . So [facility] [phone].    ATENCIÓN: Si habla español, tiene a chiang disposición servicios gratuitos de asistencia lingüística. [facility] [phone].    We comply with applicable federal civil rights laws and [place] laws. We do not discriminate on the basis of race, color, national origin, age, disability sex, sexual orientation or gender identity.            Thank you!     Thank you for choosing [facility]  for your care. Our goal is always to provide you with excellent care. Hearing back from our patients is one way we can continue to improve our services. Please take a few minutes to complete the written survey that you may receive in the mail after your visit with us. Thank you!             Your Updated Medication List - Protect others around you: Learn how to safely use, store and throw away your medicines at www.disposemymeds.org.          This list is accurate as of: 8/8/17  1:50 PM.  Always use your most recent med list.                   Brand Name Dispense Instructions for use Diagnosis    blood glucose monitoring test strip    ONE TOUCH ULTRA    300 each    Use to test blood sugars 4 times  daily as needed or as directed.    Diabetes mellitus, type 2 (H)       Calcium Carb-Cholecalciferol 500-400 MG-UNIT Tabs    calcium 500 +D    90 tablet    Take 500 mg by mouth daily    Malnutrition (H)       cholecalciferol 1000 UNITS capsule    vitamin  -D    90 capsule    Take 1 capsule (1,000 Units) by mouth daily    Malnutrition (H)       cyanocobalamin 1000 MCG Tabs     30 tablet    1,000 mcg by Per G Tube route daily    Alcoholic cirrhosis of liver with ascites (H)       dulaglutide 0.75 MG/0.5ML pen    TRULICITY    2 mL    Inject 0.75 mg Subcutaneous every 7 days    DM type 2, goal HbA1c 7%-8% (H)       ferrous sulfate 325 (65 FE) MG tablet    IRON    200 tablet    Take 1 tablet (325 mg) by mouth 2 times daily    Other iron deficiency anemia       gabapentin 100 MG capsule    NEURONTIN    30 capsule    Take 1 capsule (100 mg) by mouth At Bedtime    Mild episode of recurrent major depressive disorder (H)       glipiZIDE 10 MG 24 hr tablet    GLUCOTROL XL    90 tablet    Take 1 tablet (10 mg) by mouth daily    DM type 2, goal HbA1c 7%-8% (H)       HYDROcodone-acetaminophen 5-325 MG per tablet    NORCO    60 tablet    Take 2 tablets by mouth every 6 hours as needed for moderate to severe pain    Brain tumor (H)       hydrOXYzine 25 MG tablet    ATARAX    180 tablet    Take 1 tablet (25 mg) by mouth 2 times daily    Itching, Anxiety       lactulose 10 GM/15ML solution    CHRONULAC    473 mL    Take 7.5 mLs (5 g) by mouth 2 times daily    Hepatic encephalopathy (H)       levothyroxine 88 MCG tablet    SYNTHROID/LEVOTHROID    90 tablet    Take 1 tablet (88 mcg) by mouth daily    Hypothyroidism       mirtazapine 15 MG tablet    REMERON    14 tablet    Take 1 tablet (15 mg) at bedtime.**Must attend next appointment for further refills**    Moderate episode of recurrent major depressive disorder (H)       multivitamin, therapeutic with minerals Tabs tablet      Take 1 tablet by mouth 2 times daily        omeprazole 20  MG CR capsule    priLOSEC    120 capsule    Take 2 capsules (40 mg) by mouth 2 times daily    Gastroesophageal reflux disease without esophagitis       ONE TOUCH LANCETS Misc     100 each    by In Vitro route 4 times daily as needed    Type II or unspecified type diabetes mellitus without mention of complication, not stated as uncontrolled       phenytoin 30 MG CR capsule    DILANTIN    240 capsule    Take 4 capsules (120 mg) by mouth 2 times daily    Oligoastrocytoma of parietal lobe (H)       pravastatin 80 MG tablet    PRAVACHOL    90 tablet    Take 1 tablet (80 mg) by mouth daily    High cholesterol       rifaximin 550 MG Tabs tablet    XIFAXAN    60 tablet    Take 1 tablet (550 mg) by mouth 2 times daily    Hepatic encephalopathy (H)       simethicone 80 MG chewable tablet    MYLICON    180 tablet    Take 1 tablet (80 mg) by mouth every 6 hours as needed for cramping    Flatulence, eructation and gas pain       sulfamethoxazole-trimethoprim 800-160 MG per tablet    BACTRIM DS/SEPTRA DS    60 tablet    Take 1 tablet by mouth 2 times daily    Spontaneous bacterial peritonitis (H)       thiamine 100 MG tablet     100 tablet    Take 1 tablet (100 mg) by mouth daily    Alcoholic cirrhosis of liver with ascites (H)       traZODone 50 MG tablet    DESYREL    45 tablet    Take 1.5 tablets (75 mg) by mouth nightly as needed for sleep    Primary insomnia

## 2017-08-08 NOTE — MR AVS SNAPSHOT
After Visit Summary   8/8/2017    Mono Varghese    MRN: [id number]           Patient Information     Date Of Birth          1970        Visit Information        Provider Department      8/8/2017 10:00 AM King Louis MD [facility]        Today's Diagnoses     DM type 2, goal HbA1c 7%-8% (H)    -  1    Cough        Other fatigue        Inpatient hospitalization within last 30 days        Hospital discharge follow-up          Care Instructions    Primary Care Center Phone Number [phone]  Primary Care Center Medication Refill Request Information:  * Please contact your pharmacy regarding ANY request for medication refills.  ** [facility] Prescription Fax = [phone]  * Please allow 3 business days for routine medication refills.  * Please allow 5 business days for controlled substance medication refills.     Primary Care Center Test Result notification information:  *You will be notified with in 7-10 days of your appointment day regarding the results of your test.  If you are on MyChart you will be notified as soon as the provider has reviewed the results and signed off on them.                  Follow-ups after your visit        Follow-up notes from your care team     Return in about 1 month (around 9/8/2017).      Your next 10 appointments already scheduled     Sep 06, 2017 12:35 PM CDT   (Arrive by 12:20 PM)   Return Visit with King Louis MD   [facility] ([facility])    [address] [place] [address] [id number] [phone]            Oct 18, 2017  9:00 AM CDT   Lab with  LAB   [facility] ([facility])    [address] [place] [address] [id number] [phone]            Oct 18, 2017  9:50 AM CDT   (Arrive by 9:35 AM)   Return General Liver with Beatriz Tanner MD   [facility] ([facility])    [address] [place] [address]  [address]  [address] [facility] [address]   [phone]            Oct 19, 2017  1:30 PM CDT   (Arrive by 1:15 PM)   Return Seizure with Anil English MD   [facility] ([facility])    [address] [facility]  [address] [facility] [address]   [phone]            Oct 19, 2017  2:15 PM CDT   Adult Med Follow UP with Devendra Almanzar MD   [facility] ([facility])    [place] [id number]  [address] [facility] [address]   [phone]            Apr 27, 2018 11:30 AM CDT   (Arrive by 11:15 AM)   MR BRAIN W/O & W CONTRAST with [address] MRI ([facility])    [address] [address]   [phone]           Take your medicines as usual, unless your doctor tells you not to. Bring a list of your current medicines to your exam (including vitamins, minerals and over-the-counter drugs).  You will be given intravenous contrast for this exam. To prepare:   The day before your exam, drink extra fluids at least six 8-ounce glasses (unless your doctor tells you to restrict your fluids).   Have a blood test (creatinine test) within 30 days of your exam. Go to your clinic or Diagnostic Imaging Department for this test.  The MRI machine uses a strong magnet. Please wear clothes without metal (snaps, zippers). A sweatsuit works well, or we may give you a hospital gown.  Please remove any body piercings and hair extensions before you arrive. You will also remove watches, jewelry, hairpins, wallets, dentures, partial dental plates and hearing aids. You may wear contact lenses, and you may be able to wear your rings. We have a safe place to keep your personal items, but it is safer to leave them at home.   **IMPORTANT** THE INSTRUCTIONS BELOW ARE ONLY FOR THOSE PATIENTS WHO HAVE BEEN TOLD THEY WILL RECEIVE SEDATION OR GENERAL ANESTHESIA DURING THEIR MRI PROCEDURE:  IF  YOU WILL RECEIVE SEDATION (take medicine to help you relax during your exam):   You must get the medicine from your doctor before you arrive. Bring the medicine to the exam. Do not take it at home.   Arrive one hour early. Bring someone who can take you home after the test. Your medicine will make you sleepy. After the exam, you may not drive, take a bus or take a taxi by yourself.   No eating 8 hours before your exam. You may have clear liquids up until 4 hours before your exam. (Clear liquids include water, clear tea, black coffee and fruit juice without pulp.)  IF YOU WILL RECEIVE ANESTHESIA (be asleep for your exam):   Arrive 1 1/2 hours early. Bring someone who can take you home after the test. You may not drive, take a bus or take a taxi by yourself.   No eating 8 hours before your exam. You may have clear liquids up until 4 hours before your exam. (Clear liquids include water, clear tea, black coffee and fruit juice without pulp.)  Please call the Imaging Department at your exam site with any questions.            May 04, 2018 11:00 AM CDT   (Arrive by 10:45 AM)   Return Visit with Lauro Shaw MD   [facility] ([facility])    [address] [place] [id number] [phone]              Who to contact     Please call your clinic at [phone] to:    Ask questions about your health    Make or cancel appointments    Discuss your medicines    Learn about your test results    Speak to your doctor   If you have compliments or concerns about an experience at your clinic, or if you wish to file a complaint, please contact [facility] Patient Relations at [phone] or email us at [email or web address]         Additional Information About Your Visit        [employer] Information     [employer] gives you secure access to your electronic health record. If you see a primary care provider, you can also send messages to  your care team and make appointments. If you have questions, please call your primary care clinic.  If you do not have a primary care provider, please call [phone] and they will assist you.      [employer] is an electronic gateway that provides easy, online access to your medical records. With [employer], you can request a clinic appointment, read your test results, renew a prescription or communicate with your care team.     To access your existing account, please contact your [facility] or call [phone] for assistance.        Care EveryWhere ID     This is your Care EveryWhere ID. This could be used by other organizations to access your [place] medical records  [id number]        Your Vitals Were     Pulse Respirations BMI (Body Mass Index)             65 20 30.26 kg/m2          Blood Pressure from Last 3 Encounters:   08/17/17 132/83   08/08/17 126/88   08/01/17 125/73    Weight from Last 3 Encounters:   08/17/17 94.3 kg (208 lb)   08/08/17 95.7 kg (211 lb)   08/08/17 95.7 kg (210 lb 14.4 oz)                 Today's Medication Changes          These changes are accurate as of: 8/8/17 11:59 PM.  If you have any questions, ask your nurse or doctor.               These medicines have changed or have updated prescriptions.        Dose/Directions    cyanocobalamin 1000 MCG Tabs   This may have changed:  how to take this   Used for:  Alcoholic cirrhosis of liver with ascites (H)        Dose:  1000 mcg   1,000 mcg by Per G Tube route daily   Quantity:  30 tablet   Refills:  0                Primary Care Provider Office Phone # Fax #    King Louis [phone]       [address] [address]        Equal Access to Services     [facility] : Hadward Thomas, olesya martinez, josue mario. So [facility] [phone].    ATENCIÓN: Si habla español, tiene a chiang disposición servicios  nicola de asistencia lingüística. Manuelito betancourt [phone].    We comply with applicable federal civil rights laws and [place] laws. We do not discriminate on the basis of race, color, national origin, age, disability sex, sexual orientation or gender identity.            Thank you!     Thank you for choosing [facility]  for your care. Our goal is always to provide you with excellent care. Hearing back from our patients is one way we can continue to improve our services. Please take a few minutes to complete the written survey that you may receive in the mail after your visit with us. Thank you!             Your Updated Medication List - Protect others around you: Learn how to safely use, store and throw away your medicines at www.disposemymeds.org.          This list is accurate as of: 8/8/17 11:59 PM.  Always use your most recent med list.                   Brand Name Dispense Instructions for use Diagnosis    blood glucose monitoring test strip    ONE TOUCH ULTRA    300 each    Use to test blood sugars 4 times daily as needed or as directed.    Diabetes mellitus, type 2 (H)       Calcium Carb-Cholecalciferol 500-400 MG-UNIT Tabs    calcium 500 +D    90 tablet    Take 500 mg by mouth daily    Malnutrition (H)       cholecalciferol 1000 UNITS capsule    vitamin  -D    90 capsule    Take 1 capsule (1,000 Units) by mouth daily    Malnutrition (H)       cyanocobalamin 1000 MCG Tabs     30 tablet    1,000 mcg by Per G Tube route daily    Alcoholic cirrhosis of liver with ascites (H)       dulaglutide 0.75 MG/0.5ML pen    TRULICITY    2 mL    Inject 0.75 mg Subcutaneous every 7 days    DM type 2, goal HbA1c 7%-8% (H)       ferrous sulfate 325 (65 FE) MG tablet    IRON    200 tablet    Take 1 tablet (325 mg) by mouth 2 times daily    Other iron deficiency anemia       glipiZIDE 10 MG 24 hr tablet    GLUCOTROL XL    90 tablet    Take 1 tablet (10 mg) by mouth daily    DM type 2, goal HbA1c 7%-8% (H)        HYDROcodone-acetaminophen 5-325 MG per tablet    NORCO    60 tablet    Take 2 tablets by mouth every 6 hours as needed for moderate to severe pain    Brain tumor (H)       hydrOXYzine 25 MG tablet    ATARAX    180 tablet    Take 1 tablet (25 mg) by mouth 2 times daily    Itching, Anxiety       lactulose 10 GM/15ML solution    CHRONULAC    473 mL    Take 7.5 mLs (5 g) by mouth 2 times daily    Hepatic encephalopathy (H)       levothyroxine 88 MCG tablet    SYNTHROID/LEVOTHROID    90 tablet    Take 1 tablet (88 mcg) by mouth daily    Hypothyroidism       mirtazapine 15 MG tablet    REMERON    14 tablet    Take 1 tablet (15 mg) at bedtime.**Must attend next appointment for further refills**    Moderate episode of recurrent major depressive disorder (H)       multivitamin, therapeutic with minerals Tabs tablet      Take 1 tablet by mouth 2 times daily        omeprazole 20 MG CR capsule    priLOSEC    120 capsule    Take 2 capsules (40 mg) by mouth 2 times daily    Gastroesophageal reflux disease without esophagitis       ONE TOUCH LANCETS Misc     100 each    by In Vitro route 4 times daily as needed    Type II or unspecified type diabetes mellitus without mention of complication, not stated as uncontrolled       phenytoin 30 MG CR capsule    DILANTIN    240 capsule    Take 4 capsules (120 mg) by mouth 2 times daily    Oligoastrocytoma of parietal lobe (H)       rifaximin 550 MG Tabs tablet    XIFAXAN    60 tablet    Take 1 tablet (550 mg) by mouth 2 times daily    Hepatic encephalopathy (H)       simethicone 80 MG chewable tablet    MYLICON    180 tablet    Take 1 tablet (80 mg) by mouth every 6 hours as needed for cramping    Flatulence, eructation and gas pain       thiamine 100 MG tablet     100 tablet    Take 1 tablet (100 mg) by mouth daily    Alcoholic cirrhosis of liver with ascites (H)

## 2017-08-10 ENCOUNTER — DOCUMENTATION ONLY (OUTPATIENT)
Dept: CARE COORDINATION | Facility: CLINIC | Age: 47
End: 2017-08-10

## 2017-08-10 DIAGNOSIS — E78.00 HIGH CHOLESTEROL: ICD-10-CM

## 2017-08-10 DIAGNOSIS — K65.2 SPONTANEOUS BACTERIAL PERITONITIS (H): ICD-10-CM

## 2017-08-10 NOTE — PROGRESS NOTES
[facility] utilizes an encounter to take the place of a direct phone call to your office. Please take a moment to review the below request. Your reply to this encounter will act as a verbal OK of orders if requested below. Thank you.    Patient Update:    Client continues to have liquid stools throughout the day since Tuesday.  Held Lactulose since Tuesday, but did take 5cc this AM.  Abdomen is hard to the touch.  No pain or discomfort present.  Continues to not pass any gas. Client and spouse concerned with what is going on.  Client has been taking simethicone two to three times daily.      Please Advise    Angela Manley RN    [phone]  [email or web address]

## 2017-08-13 RX ORDER — PRAVASTATIN SODIUM 80 MG/1
TABLET ORAL
Qty: 90 TABLET | Refills: 1 | Status: SHIPPED
Start: 2017-08-13 | End: 2018-01-22

## 2017-08-13 NOTE — TELEPHONE ENCOUNTER
BACTRIM DS,SEPTRA DS       Last Written Prescription Date:  10/21/16  Last Fill Quantity: 60,   # refills: 5  Last Office Visit : 8/8/17  Future Office visit:  9/6/17    Routing refill request to provider for review/approval because:  Drug not on refill protocol

## 2017-08-16 ENCOUNTER — ALLIED HEALTH/NURSE VISIT (OUTPATIENT)
Dept: PHARMACY | Facility: CLINIC | Age: 47
End: 2017-08-16
Payer: MEDICAID

## 2017-08-16 DIAGNOSIS — E11.9 DM TYPE 2, GOAL HBA1C 7%-8% (H): ICD-10-CM

## 2017-08-16 DIAGNOSIS — R19.00 ABDOMINAL SWELLING: Primary | ICD-10-CM

## 2017-08-16 DIAGNOSIS — G47.00 INSOMNIA, UNSPECIFIED TYPE: ICD-10-CM

## 2017-08-16 PROCEDURE — 99606 MTMS BY PHARM EST 15 MIN: CPT | Performed by: PHARMACIST

## 2017-08-16 NOTE — PATIENT INSTRUCTIONS
Recommendations from today's MTM visit:                                                      1. I sent an order for the increased dose of Trulicity. Continue to inject this once weekly. If Mono's abdominal size or discomfort becomes worse and you want to decrease, just let us know.    Next MTM visit: Please call anytime with questions. If you have trouble with my numbers below in the transition, call the scheduling line and ask to speak to a pharmacist.    To schedule another MTM appointment, please call the clinic directly or you may call the MTM scheduling line at [phone] or toll-free at [phone].     My Clinical Pharmacist's contact information:                                                      It was a pleasure seeing you today!  Please feel free to contact me with any questions or concerns you have.      Felicia Torres PharmD  Medication Therapy Management Provider  Phone:[phone]  Pager: [phone]    You may receive a survey about the MTM services you received.  I would appreciate your feedback to help me serve you better in the future. Please fill it out and return it when you can. Your comments will be anonymous.

## 2017-08-16 NOTE — MR AVS SNAPSHOT
After Visit Summary   8/16/2017    Mono Varghese    MRN: [id number]           Patient Information     Date Of Birth          1970        Visit Information        Provider Department      8/16/2017 11:00 AM Felicia Torres [facility] Medication Therapy Management        Today's Diagnoses     DM type 2, goal HbA1c 7%-8% (H)          Care Instructions    Recommendations from today's MTM visit:                                                      1. I sent an order for the increased dose of Trulicity. Continue to inject this once weekly. If Sherrys abdominal size or discomfort becomes worse and you want to decrease, just let us know.    Next MTM visit: Please call anytime with questions. If you have trouble with my numbers below in the transition, call the scheduling line and ask to speak to a pharmacist.    To schedule another MTM appointment, please call the clinic directly or you may call the MTM scheduling line at [phone] or toll-free at [phone].     My Clinical Pharmacist's contact information:                                                      It was a pleasure seeing you today!  Please feel free to contact me with any questions or concerns you have.      Felicia Torres, PharmD  Medication Therapy Management Provider  Phone:[phone]  Pager: [phone]    You may receive a survey about the MTM services you received.  I would appreciate your feedback to help me serve you better in the future. Please fill it out and return it when you can. Your comments will be anonymous.                Follow-ups after your visit        Your next 10 appointments already scheduled     Aug 17, 2017  1:45 PM CDT   Adult Med Follow UP with Devendra Almanzar MD   Psychiatry Clinic ([facility])    [address]  [address] [facility] [address]   [phone]            Sep 06, 2017 12:35 PM CDT   (Arrive by 12:20 PM)   Return Visit with King IRVIN  MD Missy   [facility] ([facility])    [address] [place] [address]  [address] [facility] [address]   [phone]            Oct 18, 2017  9:00 AM CDT   Lab with  LAB   [facility] Lab ([facility])    [address] [place] [address]   [phone]            Oct 18, 2017  9:50 AM CDT   (Arrive by 9:35 AM)   Return General Liver with Beatriz Tanner MD   [facility] Hepatology ([facility])    [address] [place] [address]   [phone]            Oct 19, 2017  1:30 PM CDT   (Arrive by 1:15 PM)   Return Seizure with Anil English MD   [facility] Neurology ([facility])    [address] [place] [address]   [phone]            Apr 27, 2018 11:30 AM CDT   (Arrive by 11:15 AM)   MR BRAIN W/O & W CONTRAST with [address] MRI ([facility])    [address] [place] [id number] [phone]           Take your medicines as usual, unless your doctor tells you not to. Bring a list of your current medicines to your exam (including vitamins, minerals and over-the-counter drugs).  You will be given intravenous contrast for this exam. To prepare:   The day before your exam, drink extra fluids at least six 8-ounce glasses (unless your doctor tells you to restrict your fluids).   Have a blood test (creatinine test) within 30 days of your exam. Go to your clinic or Diagnostic Imaging Department for this test.  The MRI machine uses a strong magnet. Please wear clothes without metal (snaps, zippers). A sweatsuit works well, or we may give you a hospital gown.  Please remove any body piercings and hair extensions before you arrive. You will also remove watches, jewelry, hairpins, wallets, dentures, partial dental plates and hearing aids. You may wear  contact lenses, and you may be able to wear your rings. We have a safe place to keep your personal items, but it is safer to leave them at home.   **IMPORTANT** THE INSTRUCTIONS BELOW ARE ONLY FOR THOSE PATIENTS WHO HAVE BEEN TOLD THEY WILL RECEIVE SEDATION OR GENERAL ANESTHESIA DURING THEIR MRI PROCEDURE:  IF YOU WILL RECEIVE SEDATION (take medicine to help you relax during your exam):   You must get the medicine from your doctor before you arrive. Bring the medicine to the exam. Do not take it at home.   Arrive one hour early. Bring someone who can take you home after the test. Your medicine will make you sleepy. After the exam, you may not drive, take a bus or take a taxi by yourself.   No eating 8 hours before your exam. You may have clear liquids up until 4 hours before your exam. (Clear liquids include water, clear tea, black coffee and fruit juice without pulp.)  IF YOU WILL RECEIVE ANESTHESIA (be asleep for your exam):   Arrive 1 1/2 hours early. Bring someone who can take you home after the test. You may not drive, take a bus or take a taxi by yourself.   No eating 8 hours before your exam. You may have clear liquids up until 4 hours before your exam. (Clear liquids include water, clear tea, black coffee and fruit juice without pulp.)  Please call the Imaging Department at your exam site with any questions.            May 04, 2018 11:00 AM CDT   (Arrive by 10:45 AM)   Return Visit with Lauro Shaw MD   [facility] ([facility])    [address] [place] [address] [phone]              Who to contact     If you have questions or need follow up information about today's clinic visit or your schedule please contact [facility] MEDICATION THERAPY MANAGEMENT directly at [phone].  Normal or non-critical lab and imaging results will be communicated to you by MyChart, letter or phone within 4 business days after the clinic has received the  results. If you do not hear from us within 7 days, please contact the clinic through [employer] or phone. If you have a critical or abnormal lab result, we will notify you by phone as soon as possible.  Submit refill requests through [employer] or call your pharmacy and they will forward the refill request to us. Please allow 3 business days for your refill to be completed.          Additional Information About Your Visit        [facility] [employer] Information     [employer] gives you secure access to your electronic health record. If you see a primary care provider, you can also send messages to your care team and make appointments. If you have questions, please call your primary care clinic.  If you do not have a primary care provider, please call [phone] and they will assist you.        Care EveryWhere ID     This is your Care EveryWhere ID. This could be used by other organizations to access your [facility] medical records  [id number]         Blood Pressure from Last 3 Encounters:   08/08/17 126/88   08/01/17 125/73   07/28/17 132/79    Weight from Last 3 Encounters:   08/08/17 211 lb (95.7 kg)   08/08/17 210 lb 14.4 oz (95.7 kg)   07/31/17 211 lb (95.7 kg)              Today, you had the following     No orders found for display         Today's Medication Changes          These changes are accurate as of: 8/16/17 11:51 AM.  If you have any questions, ask your nurse or doctor.               Start taking these medicines.        Dose/Directions    dulaglutide 1.5 MG/0.5ML pen   Commonly known as:  TRULICITY   Used for:  DM type 2, goal HbA1c 7%-8% (H)   Replaces:  dulaglutide 0.75 MG/0.5ML pen        Dose:  1.5 mg   Inject 1.5 mg Subcutaneous every 7 days   Quantity:  2 mL   Refills:  1         These medicines have changed or have updated prescriptions.        Dose/Directions    cyanocobalamin 1000 MCG Tabs   This may have changed:  how to take this   Used for:  Alcoholic cirrhosis of liver with ascites (H)        Dose:  1000 mcg    1,000 mcg by Per G Tube route daily   Quantity:  30 tablet   Refills:  0         Stop taking these medicines if you haven't already. Please contact your care team if you have questions.     dulaglutide 0.75 MG/0.5ML pen   Commonly known as:  TRULICITY   Replaced by:  dulaglutide 1.5 MG/0.5ML pen                Where to get your medicines      These medications were sent to [facility] [address] [place] [address] [facility]  [address] [facility] [address]     Phone:  [phone]     dulaglutide 1.5 MG/0.5ML pen                Primary Care Provider Office Phone # Fax #    King Louis [phone]       [address] [address]        Equal Access to Services     SAMUEL FAIR : Hadii kevin mendoza hadasho Soomaali, waaxda luqadaha, qaybta kaalmada adeegyada, josue roque . So [facility] [phone].    ATENCIÓN: Si habla español, tiene a chiang disposición servicios gratuitos de asistencia lingüística. Llame al [phone].    We comply with applicable federal civil rights laws and [place] laws. We do not discriminate on the basis of race, color, national origin, age, disability sex, sexual orientation or gender identity.            Thank you!     Thank you for choosing [facility] MEDICATION THERAPY MANAGEMENT  for your care. Our goal is always to provide you with excellent care. Hearing back from our patients is one way we can continue to improve our services. Please take a few minutes to complete the written survey that you may receive in the mail after your visit with us. Thank you!             Your Updated Medication List - Protect others around you: Learn how to safely use, store and throw away your medicines at www.disposemymeds.org.          This list is accurate as of: 8/16/17 11:51 AM.  Always use your most recent med list.                   Brand Name Dispense Instructions for use Diagnosis    blood glucose monitoring test strip    ONE  TOUCH ULTRA    300 each    Use to test blood sugars 4 times daily as needed or as directed.    Diabetes mellitus, type 2 (H)       Calcium Carb-Cholecalciferol 500-400 MG-UNIT Tabs    calcium 500 +D    90 tablet    Take 500 mg by mouth daily    Malnutrition (H)       cholecalciferol 1000 UNITS capsule    vitamin  -D    90 capsule    Take 1 capsule (1,000 Units) by mouth daily    Malnutrition (H)       cyanocobalamin 1000 MCG Tabs     30 tablet    1,000 mcg by Per G Tube route daily    Alcoholic cirrhosis of liver with ascites (H)       dulaglutide 1.5 MG/0.5ML pen    TRULICITY    2 mL    Inject 1.5 mg Subcutaneous every 7 days    DM type 2, goal HbA1c 7%-8% (H)       ferrous sulfate 325 (65 FE) MG tablet    IRON    200 tablet    Take 1 tablet (325 mg) by mouth 2 times daily    Other iron deficiency anemia       gabapentin 100 MG capsule    NEURONTIN    30 capsule    Take 1 capsule (100 mg) by mouth At Bedtime    Mild episode of recurrent major depressive disorder (H)       glipiZIDE 10 MG 24 hr tablet    GLUCOTROL XL    90 tablet    Take 1 tablet (10 mg) by mouth daily    DM type 2, goal HbA1c 7%-8% (H)       HYDROcodone-acetaminophen 5-325 MG per tablet    NORCO    60 tablet    Take 2 tablets by mouth every 6 hours as needed for moderate to severe pain    Brain tumor (H)       hydrOXYzine 25 MG tablet    ATARAX    180 tablet    Take 1 tablet (25 mg) by mouth 2 times daily    Itching, Anxiety       lactulose 10 GM/15ML solution    CHRONULAC    473 mL    Take 7.5 mLs (5 g) by mouth 2 times daily    Hepatic encephalopathy (H)       levothyroxine 88 MCG tablet    SYNTHROID/LEVOTHROID    90 tablet    Take 1 tablet (88 mcg) by mouth daily    Hypothyroidism       mirtazapine 15 MG tablet    REMERON    14 tablet    Take 1 tablet (15 mg) at bedtime.**Must attend next appointment for further refills**    Moderate episode of recurrent major depressive disorder (H)       multivitamin, therapeutic with minerals Tabs tablet       Take 1 tablet by mouth 2 times daily        omeprazole 20 MG CR capsule    priLOSEC    120 capsule    Take 2 capsules (40 mg) by mouth 2 times daily    Gastroesophageal reflux disease without esophagitis       ONE TOUCH LANCETS Misc     100 each    by In Vitro route 4 times daily as needed    Type II or unspecified type diabetes mellitus without mention of complication, not stated as uncontrolled       phenytoin 30 MG CR capsule    DILANTIN    240 capsule    Take 4 capsules (120 mg) by mouth 2 times daily    Oligoastrocytoma of parietal lobe (H)       pravastatin 80 MG tablet    PRAVACHOL    90 tablet    TAKE ONE TABLET BY MOUTH ONCE DAILY    High cholesterol       rifaximin 550 MG Tabs tablet    XIFAXAN    60 tablet    Take 1 tablet (550 mg) by mouth 2 times daily    Hepatic encephalopathy (H)       simethicone 80 MG chewable tablet    MYLICON    180 tablet    Take 1 tablet (80 mg) by mouth every 6 hours as needed for cramping    Flatulence, eructation and gas pain       sulfamethoxazole-trimethoprim 800-160 MG per tablet    BACTRIM DS/SEPTRA DS    60 tablet    Take 1 tablet by mouth 2 times daily    Spontaneous bacterial peritonitis (H)       thiamine 100 MG tablet     100 tablet    Take 1 tablet (100 mg) by mouth daily    Alcoholic cirrhosis of liver with ascites (H)       traZODone 50 MG tablet    DESYREL    45 tablet    Take 1.5 tablets (75 mg) by mouth nightly as needed for sleep    Primary insomnia

## 2017-08-16 NOTE — PROGRESS NOTES
SUBJECTIVE/OBJECTIVE:                Mono Varghese is a 46 year old male called for a follow-up visit for Medication Therapy Management.  He was referred to me from our transitions of care program. I spoke with wife Yisel today as authorized per documentation on file.     Chief Complaint: Follow up from our visit on 08/08.  Pt does not have significant questions today.    Tobacco: No tobacco use  Alcohol: not currently using    Medication Adherence: no issues reported    Hx of Hepatic Encephalopathy: Pt is taking 5-10ml of lactulose daily and achieving 4-6 bms per day or more sometimes per Yisel. In terms of clarity of mind and mood, Yisel says he is continuing to do better and better.    Diabetes:  Pt currently taking glipizide 10mg daily and trulicity 0.75mg weekly. Pt is experiencing the following side effects:  Abdominal distension   SMBG: one time daily.   Ranges (patient reported): Morning fasting readings for the past week have all been in the low 200s.  Patient is not experiencing hypoglycemia  Recent symptoms of high blood sugar? none    Abdominal Distension: Pt discussed some abdominal distension/hardness with Dr. Louis at their visit last week and he suggested taking metamucil. This has helped some, as his abdomen is still larger than usual but is softening per Yisel. Per chart, pt advised this week that distension is likely gastroparesis.    Insomnia: Pt did move his doses of mirtazapine and trazodone to early evening as discussed last visit and has been sleeping from 10:30pm to 9am without morning hangover or new side effects. Yisel reports he is pleased with this.    Current labs include:  BP Readings from Last 3 Encounters:   08/08/17 126/88   08/01/17 125/73   07/28/17 132/79     Today's Vitals: There were no vitals taken for this visit.  Lab Results   Component Value Date    A1C 8.5 08/08/2017   .  Lab Results   Component Value Date    CHOL 201 07/20/2016     Lab Results    Component Value Date    TRIG 148 07/20/2016     Lab Results   Component Value Date    HDL 67 07/20/2016     Lab Results   Component Value Date     07/20/2016       Liver Function Studies -   Recent Labs   Lab Test  08/08/17   1120   PROTTOTAL  6.9   ALBUMIN  3.3*   BILITOTAL  1.2   ALKPHOS  203*   AST  51*   ALT  41       Lab Results   Component Value Date    UCRR 189 12/09/2016    MICROL 6 12/09/2016    UMALCR 3.12 12/09/2016       Last Basic Metabolic Panel:  Lab Results   Component Value Date     08/08/2017      Lab Results   Component Value Date    POTASSIUM 4.2 08/08/2017     Lab Results   Component Value Date    CHLORIDE 105 08/08/2017     Lab Results   Component Value Date    BUN 6 08/08/2017     Lab Results   Component Value Date    CR 0.86 08/08/2017     GFR Estimate   Date Value Ref Range Status   08/08/2017 >90  Non  GFR Calc   >60 mL/min/1.7m2 Final   07/31/2017 >90  Non  GFR Calc   >60 mL/min/1.7m2 Final   07/30/2017 89 >60 mL/min/1.7m2 Final     Comment:     Non  GFR Calc     GFR Estimate If Black   Date Value Ref Range Status   08/08/2017 >90   GFR Calc   >60 mL/min/1.7m2 Final   07/31/2017 >90   GFR Calc   >60 mL/min/1.7m2 Final   07/30/2017 >90   GFR Calc   >60 mL/min/1.7m2 Final     TSH   Date Value Ref Range Status   08/08/2017 7.01 (H) 0.40 - 4.00 mU/L Final   ]    Most Recent Immunizations   Administered Date(s) Administered     Influenza (IIV3) 10/13/2016     Pneumococcal 23 valent 09/18/2012     TDAP Vaccine (Boostrix) 10/14/2014       ASSESSMENT:              Current medications were reviewed today as discussed above.      Medication Adherence: no issues identified    Hx of Hepatic Encephalopathy: Stable.    Diabetes: Needs Improvement. Self monitoring of blood glucose is not at goal of fasting  mg/dL. Pt would benefit from GLP-1 agonist (Trulicity) :  increase dose to 1.5mg  weekly. We discussed that this may worsen pt's abdominal distension as this has been attributed to gastroparesis, but Yisel is still open to trying the increase and backing off if side effects are too uncomfortable.    Abdominal Distention: Improving. Especially with Trulicity increase, pt encouraged to continue supplementing fiber intake.    Insomnia: Stable. Current therapies appropriate.     PLAN:                  1. Pt to increase Trulicity to 1.5mg weekly, backing off if abdominal side effects are too troublesome. Order sent.    I spent 15 minutes with this patient today. All changes were made via collaborative practice agreement with King Louis A copy of the visit note was provided to the patient's primary care provider.     Will follow up upon request, as pt is not covered for another visit for MTM.    The patient was mailed a summary of these recommendations as an after visit summary.    Felicia Torres PharmD  Medication Therapy Management Provider  Phone:[phone]  Pager: [phone]

## 2017-08-17 ENCOUNTER — MYC REFILL (OUTPATIENT)
Dept: FAMILY MEDICINE | Facility: CLINIC | Age: 47
End: 2017-08-17

## 2017-08-17 ENCOUNTER — OFFICE VISIT (OUTPATIENT)
Dept: PSYCHIATRY | Facility: CLINIC | Age: 47
End: 2017-08-17
Attending: PSYCHIATRY & NEUROLOGY
Payer: MEDICARE

## 2017-08-17 VITALS
HEART RATE: 67 BPM | SYSTOLIC BLOOD PRESSURE: 132 MMHG | HEIGHT: 71 IN | OXYGEN SATURATION: 93 % | BODY MASS INDEX: 29.12 KG/M2 | DIASTOLIC BLOOD PRESSURE: 83 MMHG | WEIGHT: 208 LBS

## 2017-08-17 DIAGNOSIS — F33.1 MODERATE EPISODE OF RECURRENT MAJOR DEPRESSIVE DISORDER (H): ICD-10-CM

## 2017-08-17 DIAGNOSIS — K65.2 SPONTANEOUS BACTERIAL PERITONITIS (H): ICD-10-CM

## 2017-08-17 DIAGNOSIS — F33.0 MILD EPISODE OF RECURRENT MAJOR DEPRESSIVE DISORDER (H): ICD-10-CM

## 2017-08-17 DIAGNOSIS — F51.01 PRIMARY INSOMNIA: ICD-10-CM

## 2017-08-17 PROCEDURE — 99212 OFFICE O/P EST SF 10 MIN: CPT | Mod: ZF

## 2017-08-17 RX ORDER — MIRTAZAPINE 15 MG/1
30 TABLET, FILM COATED ORAL AT BEDTIME
Qty: 30 TABLET | Refills: 3 | Status: SHIPPED | OUTPATIENT
Start: 2017-08-17 | End: 2017-10-29

## 2017-08-17 RX ORDER — TRAZODONE HYDROCHLORIDE 50 MG/1
50 TABLET, FILM COATED ORAL
Qty: 30 TABLET | Refills: 3 | Status: SHIPPED | OUTPATIENT
Start: 2017-08-17 | End: 2018-01-23

## 2017-08-17 RX ORDER — GABAPENTIN 100 MG/1
100 CAPSULE ORAL AT BEDTIME
Qty: 30 CAPSULE | Refills: 3 | Status: SHIPPED | OUTPATIENT
Start: 2017-08-17 | End: 2018-01-23

## 2017-08-17 NOTE — PATIENT INSTRUCTIONS
Increase your mirtazapine dose to 30mg at night.   Continue taking your other medications as prescribed.

## 2017-08-17 NOTE — TELEPHONE ENCOUNTER
sulfamethoxazole-trimethoprim (BACTRIM DS,SEPTRA DS) 800-160 MG per tablet       Last Written Prescription Date:  10-21-16  Last Fill Quantity: 60,   # refills: 5  Last Office Visit : 8-8-17  Future Office visit:  9-6-17    Routing refill request to provider for review/approval because:  Drug not on the [facility], [facility] or [facility] refill protocol.    Was getting for Spontaneous bacterial peritonitis     Kathleen M Doege RN

## 2017-08-17 NOTE — TELEPHONE ENCOUNTER
Message from Ernestina:  Melida Mehta, RN u Aug 17, 2017 11:21 AM        ----- Message -----   From: Mono Varghese   Sent: 8/17/2017 11:02 AM   To: [facility] Nursing Staff-  Subject: Medication Renewal Request     Original authorizing provider: MD Mono Cervantes would like a refill of the following medications:  sulfamethoxazole-trimethoprim (BACTRIM DS,SEPTRA DS) 800-160 MG per tablet [King Louis MD]    Preferred pharmacy: [facility] [address] [place] [id number] [facility]    Comment:

## 2017-08-17 NOTE — MR AVS SNAPSHOT
After Visit Summary   8/17/2017    Mono Varghese    MRN: [id number]           Patient Information     Date Of Birth          1970        Visit Information        Provider Department      8/17/2017 1:45 PM Devendra Almanzar MD [facility]        Today's Diagnoses     Moderate episode of recurrent major depressive disorder (H)        Mild episode of recurrent major depressive disorder (H)        Primary insomnia          Care Instructions    Increase your mirtazapine dose to 30mg at night.   Continue taking your other medications as prescribed.           Follow-ups after your visit        Follow-up notes from your care team     Return in about 2 months (around 10/17/2017).      Your next 10 appointments already scheduled     Sep 06, 2017 12:35 PM CDT   (Arrive by 12:20 PM)   Return Visit with King Louis MD   [facility] ([facility])    [address] [place] [address]  4th [facility] [address]   [phone]            Oct 18, 2017  9:00 AM CDT   Lab with  LAB   [facility] Lab ([facility])    [address] [place] [address]   [phone]            Oct 18, 2017  9:50 AM CDT   (Arrive by 9:35 AM)   Return General Liver with Beatriz Tanner MD   [facility] Hepatology ([facility])    [address] [place] [address]   [phone]            Oct 19, 2017  1:30 PM CDT   (Arrive by 1:15 PM)   Return Seizure with Anil English MD   [facility] Neurology ([facility])    [address] [place] [address]   [phone]            Oct 19, 2017  2:15 PM CDT   Adult Med Follow UP with Devendra Almanzar MD   [facility] ([facility])    [place] [id number]  [address] [facility] [address]   [phone]            Apr 27, 2018 11:30 AM CDT    (Arrive by 11:15 AM)   MR BRAIN W/O & W CONTRAST with UCMR1   [facility] MRI ([facility])    [address]  1st Floor  [facility] [address] [phone]           Take your medicines as usual, unless your doctor tells you not to. Bring a list of your current medicines to your exam (including vitamins, minerals and over-the-counter drugs).  You will be given intravenous contrast for this exam. To prepare:   The day before your exam, drink extra fluids at least six 8-ounce glasses (unless your doctor tells you to restrict your fluids).   Have a blood test (creatinine test) within 30 days of your exam. Go to your clinic or Diagnostic Imaging Department for this test.  The MRI machine uses a strong magnet. Please wear clothes without metal (snaps, zippers). A sweatsuit works well, or we may give you a hospital gown.  Please remove any body piercings and hair extensions before you arrive. You will also remove watches, jewelry, hairpins, wallets, dentures, partial dental plates and hearing aids. You may wear contact lenses, and you may be able to wear your rings. We have a safe place to keep your personal items, but it is safer to leave them at home.   **IMPORTANT** THE INSTRUCTIONS BELOW ARE ONLY FOR THOSE PATIENTS WHO HAVE BEEN TOLD THEY WILL RECEIVE SEDATION OR GENERAL ANESTHESIA DURING THEIR MRI PROCEDURE:  IF YOU WILL RECEIVE SEDATION (take medicine to help you relax during your exam):   You must get the medicine from your doctor before you arrive. Bring the medicine to the exam. Do not take it at home.   Arrive one hour early. Bring someone who can take you home after the test. Your medicine will make you sleepy. After the exam, you may not drive, take a bus or take a taxi by yourself.   No eating 8 hours before your exam. You may have clear liquids up until 4 hours before your exam. (Clear liquids include water, clear tea, black coffee and fruit juice without  pulp.)  IF YOU WILL RECEIVE ANESTHESIA (be asleep for your exam):   Arrive 1 1/2 hours early. Bring someone who can take you home after the test. You may not drive, take a bus or take a taxi by yourself.   No eating 8 hours before your exam. You may have clear liquids up until 4 hours before your exam. (Clear liquids include water, clear tea, black coffee and fruit juice without pulp.)  Please call the Imaging Department at your exam site with any questions.            May 04, 2018 11:00 AM CDT   (Arrive by 10:45 AM)   Return Visit with Lauro Shaw MD   [facility] ([facility])    [address]  2nd [facility] [address] [phone]              Who to contact     Please call your clinic at [phone] to:    Ask questions about your health    Make or cancel appointments    Discuss your medicines    Learn about your test results    Speak to your doctor   If you have compliments or concerns about an experience at your clinic, or if you wish to file a complaint, please contact [facility] Patient Relations at [phone] or email us at [email or web address]         Additional Information About Your Visit        [employer] Information     [employer] gives you secure access to your electronic health record. If you see a primary care provider, you can also send messages to your care team and make appointments. If you have questions, please call your primary care clinic.  If you do not have a primary care provider, please call [phone] and they will assist you.      [employer] is an electronic gateway that provides easy, online access to your medical records. With [employer], you can request a clinic appointment, read your test results, renew a prescription or communicate with your care team.     To access your existing account, please contact your [facility] or call [phone] for assistance.       "  Care EveryWhere ID     This is your Care EveryWhere ID. This could be used by other organizations to access your [facility] medical records  [id number]        Your Vitals Were     Pulse Height Pulse Oximetry BMI (Body Mass Index)          67 1.803 m (5' 11\") 93% 29.01 kg/m2         Blood Pressure from Last 3 Encounters:   08/17/17 132/83   08/08/17 126/88   08/01/17 125/73    Weight from Last 3 Encounters:   08/17/17 94.3 kg (208 lb)   08/08/17 95.7 kg (211 lb)   08/08/17 95.7 kg (210 lb 14.4 oz)              Today, you had the following     No orders found for display         Today's Medication Changes          These changes are accurate as of: 8/17/17 11:59 PM.  If you have any questions, ask your nurse or doctor.               These medicines have changed or have updated prescriptions.        Dose/Directions    cyanocobalamin 1000 MCG Tabs   This may have changed:  how to take this   Used for:  Alcoholic cirrhosis of liver with ascites (H)        Dose:  1000 mcg   1,000 mcg by Per G Tube route daily   Quantity:  30 tablet   Refills:  0       * mirtazapine 15 MG tablet   Commonly known as:  REMERON   This may have changed:  Another medication with the same name was added. Make sure you understand how and when to take each.   Used for:  Moderate episode of recurrent major depressive disorder (H)   Changed by:  Kwesi Pena MD        Take 1 tablet (15 mg) at bedtime.**Must attend next appointment for further refills**   Quantity:  14 tablet   Refills:  0       * mirtazapine 15 MG tablet   Commonly known as:  REMERON   This may have changed:  You were already taking a medication with the same name, and this prescription was added. Make sure you understand how and when to take each.   Used for:  Moderate episode of recurrent major depressive disorder (H)   Changed by:  Devendra Almanzar MD        Dose:  30 mg   Take 2 tablets (30 mg) by mouth At Bedtime   Quantity:  30 tablet   Refills:  3       traZODone " 50 MG tablet   Commonly known as:  DESYREL   This may have changed:  how much to take   Used for:  Primary insomnia   Changed by:  Devendra Almanzar MD        Dose:  50 mg   Take 1 tablet (50 mg) by mouth nightly as needed for sleep   Quantity:  30 tablet   Refills:  3       * Notice:  This list has 2 medication(s) that are the same as other medications prescribed for you. Read the directions carefully, and ask your doctor or other care provider to review them with you.         Where to get your medicines      These medications were sent to [facility] [address] [place] [id number] [facility]  [address] [facility] [address]     Phone:  [phone]     gabapentin 100 MG capsule    mirtazapine 15 MG tablet    sulfamethoxazole-trimethoprim 800-160 MG per tablet    traZODone 50 MG tablet                Primary Care Provider Office Phone # Fax #    King Louis [phone]        [address] [address]        Equal Access to Services     [facility]: Hadii aad ku hadasho Soomaali, waaxda luqadaha, qaybta kaalmada adeegyada, waxay idiin haydonavonn dorothea roque . So [facility] [phone].    ATENCIÓN: Si habla español, tiene a chiang disposición servicios gratuitos de asistencia lingüística. [facility] [phone].    We comply with applicable federal civil rights laws and [place] laws. We do not discriminate on the basis of race, color, national origin, age, disability sex, sexual orientation or gender identity.            Thank you!     Thank you for choosing [facility]  for your care. Our goal is always to provide you with excellent care. Hearing back from our patients is one way we can continue to improve our services. Please take a few minutes to complete the written survey that you may receive in the mail after your visit with us. Thank you!             Your Updated Medication List - Protect others around you: Learn how to safely use, store and throw  away your medicines at www.disposemymeds.org.          This list is accurate as of: 8/17/17 11:59 PM.  Always use your most recent med list.                   Brand Name Dispense Instructions for use Diagnosis    blood glucose monitoring test strip    ONE TOUCH ULTRA    300 each    Use to test blood sugars 4 times daily as needed or as directed.    Diabetes mellitus, type 2 (H)       Calcium Carb-Cholecalciferol 500-400 MG-UNIT Tabs    calcium 500 +D    90 tablet    Take 500 mg by mouth daily    Malnutrition (H)       cholecalciferol 1000 UNITS capsule    vitamin  -D    90 capsule    Take 1 capsule (1,000 Units) by mouth daily    Malnutrition (H)       cyanocobalamin 1000 MCG Tabs     30 tablet    1,000 mcg by Per G Tube route daily    Alcoholic cirrhosis of liver with ascites (H)       dulaglutide 1.5 MG/0.5ML pen    TRULICITY    2 mL    Inject 1.5 mg Subcutaneous every 7 days    DM type 2, goal HbA1c 7%-8% (H)       ferrous sulfate 325 (65 FE) MG tablet    IRON    200 tablet    Take 1 tablet (325 mg) by mouth 2 times daily    Other iron deficiency anemia       gabapentin 100 MG capsule    NEURONTIN    30 capsule    Take 1 capsule (100 mg) by mouth At Bedtime    Mild episode of recurrent major depressive disorder (H)       glipiZIDE 10 MG 24 hr tablet    GLUCOTROL XL    90 tablet    Take 1 tablet (10 mg) by mouth daily    DM type 2, goal HbA1c 7%-8% (H)       HYDROcodone-acetaminophen 5-325 MG per tablet    NORCO    60 tablet    Take 2 tablets by mouth every 6 hours as needed for moderate to severe pain    Brain tumor (H)       hydrOXYzine 25 MG tablet    ATARAX    180 tablet    Take 1 tablet (25 mg) by mouth 2 times daily    Itching, Anxiety       lactulose 10 GM/15ML solution    CHRONULAC    473 mL    Take 7.5 mLs (5 g) by mouth 2 times daily    Hepatic encephalopathy (H)       levothyroxine 88 MCG tablet    SYNTHROID/LEVOTHROID    90 tablet    Take 1 tablet (88 mcg) by mouth daily    Hypothyroidism       *  mirtazapine 15 MG tablet    REMERON    14 tablet    Take 1 tablet (15 mg) at bedtime.**Must attend next appointment for further refills**    Moderate episode of recurrent major depressive disorder (H)       * mirtazapine 15 MG tablet    REMERON    30 tablet    Take 2 tablets (30 mg) by mouth At Bedtime    Moderate episode of recurrent major depressive disorder (H)       multivitamin, therapeutic with minerals Tabs tablet      Take 1 tablet by mouth 2 times daily        omeprazole 20 MG CR capsule    priLOSEC    120 capsule    Take 2 capsules (40 mg) by mouth 2 times daily    Gastroesophageal reflux disease without esophagitis       ONE TOUCH LANCETS Misc     100 each    by In Vitro route 4 times daily as needed    Type II or unspecified type diabetes mellitus without mention of complication, not stated as uncontrolled       phenytoin 30 MG CR capsule    DILANTIN    240 capsule    Take 4 capsules (120 mg) by mouth 2 times daily    Oligoastrocytoma of parietal lobe (H)       pravastatin 80 MG tablet    PRAVACHOL    90 tablet    TAKE ONE TABLET BY MOUTH ONCE DAILY    High cholesterol       rifaximin 550 MG Tabs tablet    XIFAXAN    60 tablet    Take 1 tablet (550 mg) by mouth 2 times daily    Hepatic encephalopathy (H)       simethicone 80 MG chewable tablet    MYLICON    180 tablet    Take 1 tablet (80 mg) by mouth every 6 hours as needed for cramping    Flatulence, eructation and gas pain       sulfamethoxazole-trimethoprim 800-160 MG per tablet    BACTRIM DS/SEPTRA DS    60 tablet    Take 1 tablet by mouth 2 times daily    Spontaneous bacterial peritonitis (H)       thiamine 100 MG tablet     100 tablet    Take 1 tablet (100 mg) by mouth daily    Alcoholic cirrhosis of liver with ascites (H)       traZODone 50 MG tablet    DESYREL    30 tablet    Take 1 tablet (50 mg) by mouth nightly as needed for sleep    Primary insomnia       * Notice:  This list has 2 medication(s) that are the same as other medications  prescribed for you. Read the directions carefully, and ask your doctor or other care provider to review them with you.

## 2017-08-17 NOTE — NURSING NOTE
Chief Complaint   Patient presents with     Recheck Medication     MDD     Reviewed allergies, smoking status, and pharmacy preference  Obtained weight, blood pressure and heart rate     Jennifer Navarrete CMA
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
## 2017-08-18 RX ORDER — SULFAMETHOXAZOLE/TRIMETHOPRIM 800-160 MG
1 TABLET ORAL 2 TIMES DAILY
Qty: 60 TABLET | Refills: 5 | Status: SHIPPED | OUTPATIENT
Start: 2017-08-18 | End: 2018-01-26

## 2017-08-18 ASSESSMENT — PATIENT HEALTH QUESTIONNAIRE - PHQ9: SUM OF ALL RESPONSES TO PHQ QUESTIONS 1-9: 18

## 2017-08-20 NOTE — PROGRESS NOTES
SUBJECTIVE:    Pt is a 46 year old male with pmh of     Patient Active Problem List   Diagnosis     Type 2 diabetes mellitus (H)     Moderate major depression (H)     LENA (obstructive sleep apnea)-moderate (AHI 16)     Oligoastrocytoma of parietal lobe (H)     ADHD (attention deficit hyperactivity disorder)     Financial difficulties     Thrombocytopenia (H)     Partial epilepsy with impairment of consciousness (H)     Cirrhosis of liver (H)     Pulmonary nodules     Myopic astigmatism of both eyes     Presbyopia     Ringing in ears, unspecified laterality     GIB (gastrointestinal bleeding)     Portal vein thrombosis     Advance care planning     Chronic pain     Hyperlipidemia LDL goal <100     Pancytopenia (H)     Hypothyroidism     Malnutrition (H)     Hx of psychiatric care     Encephalopathy, hepatic (H)       who is here for evaluation of had concerns including Hospital F/U.    Here w/ wife. Inpt stay reviewed, I'd also discussed in person w/ hospitalist. Mentaion seemed to improve w/ lactulose.     DM: much better sugars, normal range, on well tolerated Trulicity.    Cough, several days, high risk, no SOB or fever. No ENT sx.    Past Medical History:   Diagnosis Date     Brain tumor (H)      Liver failure (H)      Past Surgical History:   Procedure Laterality Date     COLONOSCOPY N/A 11/27/2015    Procedure: COMBINED COLONOSCOPY, SINGLE OR MULTIPLE BIOPSY/POLYPECTOMY BY BIOPSY;  Surgeon: Ran Thurston MD;  Location:  GI     ESOPHAGOSCOPY, GASTROSCOPY, DUODENOSCOPY (EGD), COMBINED N/A 11/23/2015    Procedure: COMBINED ESOPHAGOSCOPY, GASTROSCOPY, DUODENOSCOPY (EGD);  Surgeon: Rocael Rizvi MD;  Location:  OR     ESOPHAGOSCOPY, GASTROSCOPY, DUODENOSCOPY (EGD), COMBINED N/A 1/25/2017    Procedure: COMBINED ESOPHAGOSCOPY, GASTROSCOPY, DUODENOSCOPY (EGD), BIOPSY SINGLE OR MULTIPLE;  Surgeon: Rocael Tejada MD;  Location:  GI     ESOPHAGOSCOPY, GASTROSCOPY, DUODENOSCOPY (EGD), COMBINED N/A  3/30/2017    Procedure: COMBINED ESOPHAGOSCOPY, GASTROSCOPY, DUODENOSCOPY (EGD);  Surgeon: Jordana Ramirez MD;  Location:  GI     OPTICAL TRACKING SYSTEM CRANIOTOMY, EXCISE TUMOR, COMBINED  6/6/2013    Procedure: COMBINED OPTICAL TRACKING SYSTEM CRANIOTOMY, EXCISE TUMOR;  Left Stealth Guided Craniotomy , Tumor Resection ;  Surgeon: J Carlos Aranda MD;  Location:  OR     ORTHOPEDIC SURGERY      hand surgery- right     SIGMOIDOSCOPY FLEXIBLE N/A 11/24/2015    Procedure: SIGMOIDOSCOPY FLEXIBLE;  Surgeon: Rocael Rizvi MD;  Location:  GI     Allergies   Allergen Reactions     No Clinical Screening - See Comments      Coban and Surgilast cause itching     Tegaderm Transparent Dressing (Informational Only)      Latex Itching and Rash                     Current Outpatient Prescriptions   Medication Sig Dispense Refill     mirtazapine (REMERON) 15 MG tablet Take 1 tablet (15 mg) at bedtime.**Must attend next appointment for further refills** 14 tablet 0     HYDROcodone-acetaminophen (NORCO) 5-325 MG per tablet Take 2 tablets by mouth every 6 hours as needed for moderate to severe pain (Patient not taking: Reported on 8/8/2017) 60 tablet 0     phenytoin (DILANTIN) 30 MG CR capsule Take 4 capsules (120 mg) by mouth 2 times daily 240 capsule 1     lactulose (CHRONULAC) 10 GM/15ML solution Take 7.5 mLs (5 g) by mouth 2 times daily 473 mL 1     simethicone (MYLICON) 80 MG chewable tablet Take 1 tablet (80 mg) by mouth every 6 hours as needed for cramping 180 tablet 1     cyanocobalamin 1000 MCG TABS 1,000 mcg by Per G Tube route daily (Patient taking differently: Take 1,000 mcg by mouth daily ) 30 tablet 0     cholecalciferol (VITAMIN  -D) 1000 UNITS capsule Take 1 capsule (1,000 Units) by mouth daily 90 capsule 1     thiamine 100 MG tablet Take 1 tablet (100 mg) by mouth daily 100 tablet 1     glipiZIDE (GLUCOTROL XL) 10 MG 24 hr tablet Take 1 tablet (10 mg) by mouth daily 90 tablet 1     hydrOXYzine  (ATARAX) 25 MG tablet Take 1 tablet (25 mg) by mouth 2 times daily 180 tablet 1     rifaximin (XIFAXAN) 550 MG TABS tablet Take 1 tablet (550 mg) by mouth 2 times daily 60 tablet 11     blood glucose monitoring (ONE TOUCH ULTRA) test strip Use to test blood sugars 4 times daily as needed or as directed. 300 each 4     levothyroxine (SYNTHROID/LEVOTHROID) 88 MCG tablet Take 1 tablet (88 mcg) by mouth daily 90 tablet 3     omeprazole (PRILOSEC) 20 MG CR capsule Take 2 capsules (40 mg) by mouth 2 times daily 120 capsule 11     ferrous sulfate (IRON) 325 (65 FE) MG tablet Take 1 tablet (325 mg) by mouth 2 times daily 200 tablet 3     Calcium Carb-Cholecalciferol (CALCIUM 500 +D) 500-400 MG-UNIT TABS Take 500 mg by mouth daily 90 tablet 1     multivitamin, therapeutic with minerals (THERA-VIT-M) TABS Take 1 tablet by mouth 2 times daily       ONE TOUCH LANCETS MISC by In Vitro route 4 times daily as needed 100 each prn     sulfamethoxazole-trimethoprim (BACTRIM DS/SEPTRA DS) 800-160 MG per tablet Take 1 tablet by mouth 2 times daily 60 tablet 5     mirtazapine (REMERON) 15 MG tablet Take 2 tablets (30 mg) by mouth At Bedtime 30 tablet 3     gabapentin (NEURONTIN) 100 MG capsule Take 1 capsule (100 mg) by mouth At Bedtime 30 capsule 3     traZODone (DESYREL) 50 MG tablet Take 1 tablet (50 mg) by mouth nightly as needed for sleep 30 tablet 3     dulaglutide (TRULICITY) 1.5 MG/0.5ML pen Inject 1.5 mg Subcutaneous every 7 days 2 mL 1     pravastatin (PRAVACHOL) 80 MG tablet TAKE ONE TABLET BY MOUTH ONCE DAILY 90 tablet 1       Social History   Substance Use Topics     Smoking status: Never Smoker     Smokeless tobacco: Never Used     Alcohol use No      Comment: Quit 01/2014         OBJECTIVE:  /88  Pulse 65  Resp 20  Wt 95.7 kg (210 lb 14.4 oz)  BMI 30.26 kg/m2  GENERAL APPEARANCE: Alert, no acute distress  EYES: PERRL, EOM normal, conjunctiva and lids normal  HENT: Ears and TMs normal, oral mucosa and posterior  oropharynx normal  NECK: No adenopathy,masses or thyromegaly  RESP: lungs clear to auscultation   CV: normal rate, regular rhythm, no murmur or gallop  ABDOMEN: soft, no organomegaly, masses or tenderness  MS: extremities normal, no peripheral edema    ASSESSMENT/PLAN:    DM: cont as is  Cough: cxr given high risk  Liver disease: cont w/ hepatology, cont lactulose, discussed stool side effects  See me in a mo earlier prn--has upcoming visits w/ psychiatry, GI, neurology, oncology, rvwd, pt/wife aware      KAYODE LANGSTON MD

## 2017-08-23 RX ORDER — SULFAMETHOXAZOLE/TRIMETHOPRIM 800-160 MG
1 TABLET ORAL 2 TIMES DAILY
Qty: 60 TABLET | Refills: 5 | OUTPATIENT
Start: 2017-08-23

## 2017-08-25 DIAGNOSIS — K70.31 ALCOHOLIC CIRRHOSIS OF LIVER WITH ASCITES (H): ICD-10-CM

## 2017-08-25 NOTE — PROGRESS NOTES
[facility] utilizes an encounter to take the place of a direct phone call to your office. Please take a moment to review the below request. Your reply to this encounter will act as a verbal OK of orders if requested below. Thank you.    ORDER  SN 1 x week for 9 weeks with 2 prn  PT/OT to eval and treat    MD SUMMARY/PLAN OF CARE  GILBERTO on 8/3  Client returned home from hospital on 8/1 after an admission for Hepatic encephalopathy.  Client has been instructred to start simethicone which was set up in pill boxes today. He is also to take lactulose 5ml QD along with xifaxan BID.  Client was educated that it is ok to skip the lactulose if he is having more than 5 bowel movements per day.  Education provided on monitoring gas as client was not passing gas prior to hospital stay.  Confusion/memory/forgetfulness have been present.  Spouse is concerned about this as there is a notable decline.  BG while in the hospital ranged from 90s to 300s. This AM was 317, but client does report consuming ice cream the night before. Edu provided on appropriate diet. Client continues to eat one meal per day.  Spouse is attempting to work with patient on providing oversight on meals/hydration.  Education provided on weighing daily and tracking this.  Client continues to have dizziness and some unsteadiness. He is not using any assitive device at home. Denies falls.  PT/OT are being recommended along with SN services.    Angela Manley RN    [phone]  [email or web address]        
70

## 2017-08-28 RX ORDER — LANOLIN ALCOHOL/MO/W.PET/CERES
1000 CREAM (GRAM) TOPICAL DAILY
Qty: 90 TABLET | Refills: 3 | Status: SHIPPED | OUTPATIENT
Start: 2017-08-28 | End: 2017-08-30

## 2017-08-28 NOTE — TELEPHONE ENCOUNTER
B12      Last Written Prescription Date:  7/28/17  Last Fill Quantity: 30,   # refills: 0  Last Office Visit : 8/8/17  Future Office visit:  9/6/17  Hemoglobin   Date Value Ref Range Status   08/08/2017 12.9 (L) 13.3 - 17.7 g/dL Final

## 2017-08-30 DIAGNOSIS — K70.31 ALCOHOLIC CIRRHOSIS OF LIVER WITH ASCITES (H): ICD-10-CM

## 2017-08-30 RX ORDER — LANOLIN ALCOHOL/MO/W.PET/CERES
1000 CREAM (GRAM) TOPICAL DAILY
Qty: 130 TABLET | Refills: 2 | Status: SHIPPED | OUTPATIENT
Start: 2017-08-30 | End: 2018-01-01

## 2017-08-30 NOTE — TELEPHONE ENCOUNTER
cyanocobalamin (VITAMIN  B-12) 1000 MCG tablet  Qty changed to #130 for full packet size per [place] Medicaid requirement. Per WO DR Louis/ MIKE Granda RN

## 2017-08-31 ENCOUNTER — MYC REFILL (OUTPATIENT)
Dept: FAMILY MEDICINE | Facility: CLINIC | Age: 47
End: 2017-08-31

## 2017-08-31 DIAGNOSIS — D49.6 BRAIN TUMOR (H): ICD-10-CM

## 2017-09-01 RX ORDER — HYDROCODONE BITARTRATE AND ACETAMINOPHEN 5; 325 MG/1; MG/1
2 TABLET ORAL EVERY 6 HOURS PRN
Qty: 60 TABLET | Refills: 0 | Status: SHIPPED | OUTPATIENT
Start: 2017-09-01 | End: 2017-12-15

## 2017-09-01 NOTE — TELEPHONE ENCOUNTER
Message from RicardoCoalfield:  Melida Mehta RN Fri Sep 1, 2017 7:18 AM        ----- Message -----   From: oMno Varghese   Sent: 8/31/2017 8:38 PM   To: [facility] Nursing Staff-  Subject: Medication Renewal Request     Original authorizing provider: MD Mono Cervantes would like a refill of the following medications:  HYDROcodone-acetaminophen (NORCO) 5-325 MG per tablet [King Louis MD]    Preferred pharmacy: [facility] [address] [place] [id number] [facility]    Comment:

## 2017-09-06 ENCOUNTER — OFFICE VISIT (OUTPATIENT)
Dept: FAMILY MEDICINE | Facility: CLINIC | Age: 47
End: 2017-09-06

## 2017-09-06 VITALS
HEART RATE: 75 BPM | WEIGHT: 206 LBS | BODY MASS INDEX: 28.73 KG/M2 | OXYGEN SATURATION: 94 % | DIASTOLIC BLOOD PRESSURE: 85 MMHG | SYSTOLIC BLOOD PRESSURE: 132 MMHG

## 2017-09-06 DIAGNOSIS — Z13.89 SCREENING FOR DIABETIC PERIPHERAL NEUROPATHY: Primary | ICD-10-CM

## 2017-09-06 DIAGNOSIS — R41.0 CONFUSION: ICD-10-CM

## 2017-09-06 LAB
ALBUMIN SERPL-MCNC: 3.3 G/DL (ref 3.4–5)
ALBUMIN UR-MCNC: NEGATIVE MG/DL
ALP SERPL-CCNC: 196 U/L (ref 40–150)
ALT SERPL W P-5'-P-CCNC: 46 U/L (ref 0–70)
AMMONIA PLAS-SCNC: 66 UMOL/L (ref 10–50)
ANION GAP SERPL CALCULATED.3IONS-SCNC: 6 MMOL/L (ref 3–14)
APPEARANCE UR: CLEAR
AST SERPL W P-5'-P-CCNC: 55 U/L (ref 0–45)
BASOPHILS # BLD AUTO: 0 10E9/L (ref 0–0.2)
BASOPHILS NFR BLD AUTO: 0.7 %
BILIRUB SERPL-MCNC: 1 MG/DL (ref 0.2–1.3)
BILIRUB UR QL STRIP: NEGATIVE
BUN SERPL-MCNC: 9 MG/DL (ref 7–30)
CALCIUM SERPL-MCNC: 8.2 MG/DL (ref 8.5–10.1)
CHLORIDE SERPL-SCNC: 106 MMOL/L (ref 94–109)
CO2 SERPL-SCNC: 24 MMOL/L (ref 20–32)
COLOR UR AUTO: YELLOW
CREAT SERPL-MCNC: 0.87 MG/DL (ref 0.66–1.25)
DIFFERENTIAL METHOD BLD: ABNORMAL
EOSINOPHIL # BLD AUTO: 0.1 10E9/L (ref 0–0.7)
EOSINOPHIL NFR BLD AUTO: 6.3 %
ERYTHROCYTE [DISTWIDTH] IN BLOOD BY AUTOMATED COUNT: 14.8 % (ref 10–15)
GFR SERPL CREATININE-BSD FRML MDRD: >90 ML/MIN/1.7M2
GLUCOSE SERPL-MCNC: 304 MG/DL (ref 70–99)
GLUCOSE UR STRIP-MCNC: >499 MG/DL
HCT VFR BLD AUTO: 37.7 % (ref 40–53)
HGB BLD-MCNC: 12.6 G/DL (ref 13.3–17.7)
HGB UR QL STRIP: NEGATIVE
IMM GRANULOCYTES # BLD: 0 10E9/L (ref 0–0.4)
IMM GRANULOCYTES NFR BLD: 0.7 %
KETONES UR STRIP-MCNC: NEGATIVE MG/DL
LEUKOCYTE ESTERASE UR QL STRIP: NEGATIVE
LYMPHOCYTES # BLD AUTO: 0.3 10E9/L (ref 0.8–5.3)
LYMPHOCYTES NFR BLD AUTO: 20.8 %
MCH RBC QN AUTO: 32.2 PG (ref 26.5–33)
MCHC RBC AUTO-ENTMCNC: 33.4 G/DL (ref 31.5–36.5)
MCV RBC AUTO: 96 FL (ref 78–100)
MONOCYTES # BLD AUTO: 0.1 10E9/L (ref 0–1.3)
MONOCYTES NFR BLD AUTO: 8.3 %
MUCOUS THREADS #/AREA URNS LPF: PRESENT /LPF
NEUTROPHILS # BLD AUTO: 0.9 10E9/L (ref 1.6–8.3)
NEUTROPHILS NFR BLD AUTO: 63.2 %
NITRATE UR QL: NEGATIVE
NRBC # BLD AUTO: 0 10*3/UL
NRBC BLD AUTO-RTO: 0 /100
PH UR STRIP: 5 PH (ref 5–7)
PLATELET # BLD AUTO: 38 10E9/L (ref 150–450)
POTASSIUM SERPL-SCNC: 4.1 MMOL/L (ref 3.4–5.3)
PROT SERPL-MCNC: 7.1 G/DL (ref 6.8–8.8)
RBC # BLD AUTO: 3.91 10E12/L (ref 4.4–5.9)
RBC #/AREA URNS AUTO: 0 /HPF (ref 0–2)
SODIUM SERPL-SCNC: 136 MMOL/L (ref 133–144)
SOURCE: ABNORMAL
SP GR UR STRIP: 1.02 (ref 1–1.03)
SQUAMOUS #/AREA URNS AUTO: <1 /HPF (ref 0–1)
UROBILINOGEN UR STRIP-MCNC: 0 MG/DL (ref 0–2)
WBC # BLD AUTO: 1.4 10E9/L (ref 4–11)
WBC #/AREA URNS AUTO: <1 /HPF (ref 0–2)

## 2017-09-06 ASSESSMENT — PAIN SCALES - GENERAL: PAINLEVEL: NO PAIN (0)

## 2017-09-06 NOTE — MR AVS SNAPSHOT
After Visit Summary   9/6/2017    Mono Varghese    MRN: [id number]           Patient Information     Date Of Birth          1970        Visit Information        Provider Department      9/6/2017 12:35 PM King Louis MD [facility]        Today's Diagnoses     Screening for diabetic peripheral neuropathy    -  1    Confusion           Follow-ups after your visit        Your next 10 appointments already scheduled     Oct 18, 2017  9:00 AM CDT   Lab with  LAB   [facility] Lab ([facility])    [address] [place] [address]   [phone]            Oct 18, 2017  9:50 AM CDT   (Arrive by 9:35 AM)   Return General Liver with Beatriz Tanner MD   [facility] Hepatology ([facility])    [address] [place] [address]   [phone]            Oct 19, 2017  1:30 PM CDT   (Arrive by 1:15 PM)   Return Seizure with Anil English MD   [facility] Neurology ([facility])    [address] [place] [address]   [phone]            Oct 19, 2017  2:15 PM CDT   Adult Med Follow UP with Devendra Almanzar MD   Psychiatry Clinic ([facility])    [place] [id number]  [address] [place] [address]   [phone]            Apr 27, 2018 11:30 AM CDT   (Arrive by 11:15 AM)   MR BRAIN W/O & W CONTRAST with [address] [facility] MRI ([facility])    [address] [place] [id number] [phone]           Take your medicines as usual, unless your doctor tells you not to. Bring a list of your current medicines to your exam (including vitamins, minerals and over-the-counter drugs).  You will be given intravenous contrast for this exam. To prepare:   The day before your exam, drink extra fluids at least six 8-ounce glasses (unless your  doctor tells you to restrict your fluids).   Have a blood test (creatinine test) within 30 days of your exam. Go to your clinic or Diagnostic Imaging Department for this test.  The MRI machine uses a strong magnet. Please wear clothes without metal (snaps, zippers). A sweatsuit works well, or we may give you a hospital gown.  Please remove any body piercings and hair extensions before you arrive. You will also remove watches, jewelry, hairpins, wallets, dentures, partial dental plates and hearing aids. You may wear contact lenses, and you may be able to wear your rings. We have a safe place to keep your personal items, but it is safer to leave them at home.   **IMPORTANT** THE INSTRUCTIONS BELOW ARE ONLY FOR THOSE PATIENTS WHO HAVE BEEN TOLD THEY WILL RECEIVE SEDATION OR GENERAL ANESTHESIA DURING THEIR MRI PROCEDURE:  IF YOU WILL RECEIVE SEDATION (take medicine to help you relax during your exam):   You must get the medicine from your doctor before you arrive. Bring the medicine to the exam. Do not take it at home.   Arrive one hour early. Bring someone who can take you home after the test. Your medicine will make you sleepy. After the exam, you may not drive, take a bus or take a taxi by yourself.   No eating 8 hours before your exam. You may have clear liquids up until 4 hours before your exam. (Clear liquids include water, clear tea, black coffee and fruit juice without pulp.)  IF YOU WILL RECEIVE ANESTHESIA (be asleep for your exam):   Arrive 1 1/2 hours early. Bring someone who can take you home after the test. You may not drive, take a bus or take a taxi by yourself.   No eating 8 hours before your exam. You may have clear liquids up until 4 hours before your exam. (Clear liquids include water, clear tea, black coffee and fruit juice without pulp.)  Please call the Imaging Department at your exam site with any questions.            May 04, 2018 11:00 AM CDT   (Arrive by 10:45 AM)   Return Visit with Lauro Shaw  MD JAMES [facility] ([facility])    [address]  [address] [facility] [address] [phone]              Who to contact     Please call your clinic at [phone] to:    Ask questions about your health    Make or cancel appointments    Discuss your medicines    Learn about your test results    Speak to your doctor   If you have compliments or concerns about an experience at your clinic, or if you wish to file a complaint, please contact [facility] Patient Relations at [phone] or email us at [email or web address]         Additional Information About Your Visit        SpeakaboosharCleartrip Information     [employer] gives you secure access to your electronic health record. If you see a primary care provider, you can also send messages to your care team and make appointments. If you have questions, please call your primary care clinic.  If you do not have a primary care provider, please call [phone] and they will assist you.      [employer] is an electronic gateway that provides easy, online access to your medical records. With [employer], you can request a clinic appointment, read your test results, renew a prescription or communicate with your care team.     To access your existing account, please contact your [facility] or call [phone] for assistance.        Care EveryWhere ID     This is your Care EveryWhere ID. This could be used by other organizations to access your [facility] medical records  [id number]        Your Vitals Were     Pulse Pulse Oximetry BMI (Body Mass Index)             75 94% 28.73 kg/m2          Blood Pressure from Last 3 Encounters:   09/06/17 132/85   08/17/17 132/83   08/08/17 126/88    Weight from Last 3 Encounters:   09/06/17 93.4 kg (206 lb)   08/17/17 94.3 kg (208 lb)   08/08/17 95.7 kg (211 lb)              We Performed the Following     Neuropsychological testing         Primary Care Provider Office Phone # Fax #    King Louis [phone]       [address] [address]        Equal Access to Services     SAMUEL FAIR : Hadward kevin mendoza sorayao Martha, waaniada luqadaha, qaybta kaalmada kamla, josue keen laCassidycholo tamayo. So [facility] [phone].    ATENCIÓN: Si habla español, tiene a chiang disposición servicios gratuitos de asistencia lingüística. Llame al [phone].    We comply with applicable federal civil rights laws and [place] laws. We do not discriminate on the basis of race, color, national origin, age, disability sex, sexual orientation or gender identity.            Thank you!     Thank you for choosing [facility]  for your care. Our goal is always to provide you with excellent care. Hearing back from our patients is one way we can continue to improve our services. Please take a few minutes to complete the written survey that you may receive in the mail after your visit with us. Thank you!             Your Updated Medication List - Protect others around you: Learn how to safely use, store and throw away your medicines at www.disposemymeds.org.          This list is accurate as of: 9/6/17  5:33 PM.  Always use your most recent med list.                   Brand Name Dispense Instructions for use Diagnosis    blood glucose monitoring test strip    ONE TOUCH ULTRA    300 each    Use to test blood sugars 4 times daily as needed or as directed.    Diabetes mellitus, type 2 (H)       Calcium Carb-Cholecalciferol 500-400 MG-UNIT Tabs    calcium 500 +D    90 tablet    Take 500 mg by mouth daily    Malnutrition (H)       cholecalciferol 1000 UNITS capsule    vitamin  -D    90 capsule    Take 1 capsule (1,000 Units) by mouth daily    Malnutrition (H)       cyanocobalamin 1000 MCG tablet    vitamin  B-12    130 tablet    1 tablet (1,000 mcg) by Per G Tube route daily    Alcoholic cirrhosis of liver with  ascites (H)       dulaglutide 1.5 MG/0.5ML pen    TRULICITY    2 mL    Inject 1.5 mg Subcutaneous every 7 days    DM type 2, goal HbA1c 7%-8% (H)       ferrous sulfate 325 (65 FE) MG tablet    IRON    200 tablet    Take 1 tablet (325 mg) by mouth 2 times daily    Other iron deficiency anemia       gabapentin 100 MG capsule    NEURONTIN    30 capsule    Take 1 capsule (100 mg) by mouth At Bedtime    Mild episode of recurrent major depressive disorder (H)       glipiZIDE 10 MG 24 hr tablet    GLUCOTROL XL    90 tablet    Take 1 tablet (10 mg) by mouth daily    DM type 2, goal HbA1c 7%-8% (H)       HYDROcodone-acetaminophen 5-325 MG per tablet    NORCO    60 tablet    Take 2 tablets by mouth every 6 hours as needed for moderate to severe pain    Brain tumor (H)       hydrOXYzine 25 MG tablet    ATARAX    180 tablet    Take 1 tablet (25 mg) by mouth 2 times daily    Itching, Anxiety       lactulose 10 GM/15ML solution    CHRONULAC    473 mL    Take 7.5 mLs (5 g) by mouth 2 times daily    Hepatic encephalopathy (H)       levothyroxine 88 MCG tablet    SYNTHROID/LEVOTHROID    90 tablet    Take 1 tablet (88 mcg) by mouth daily    Hypothyroidism       * mirtazapine 15 MG tablet    REMERON    14 tablet    Take 1 tablet (15 mg) at bedtime.**Must attend next appointment for further refills**    Moderate episode of recurrent major depressive disorder (H)       * mirtazapine 15 MG tablet    REMERON    30 tablet    Take 2 tablets (30 mg) by mouth At Bedtime    Moderate episode of recurrent major depressive disorder (H)       multivitamin, therapeutic with minerals Tabs tablet      Take 1 tablet by mouth 2 times daily        omeprazole 20 MG CR capsule    priLOSEC    120 capsule    Take 2 capsules (40 mg) by mouth 2 times daily    Gastroesophageal reflux disease without esophagitis       ONE TOUCH LANCETS Misc     100 each    by In Vitro route 4 times daily as needed    Type II or unspecified type diabetes mellitus without mention  of complication, not stated as uncontrolled       phenytoin 30 MG CR capsule    DILANTIN    240 capsule    Take 4 capsules (120 mg) by mouth 2 times daily    Oligoastrocytoma of parietal lobe (H)       pravastatin 80 MG tablet    PRAVACHOL    90 tablet    TAKE ONE TABLET BY MOUTH ONCE DAILY    High cholesterol       rifaximin 550 MG Tabs tablet    XIFAXAN    60 tablet    Take 1 tablet (550 mg) by mouth 2 times daily    Hepatic encephalopathy (H)       simethicone 80 MG chewable tablet    MYLICON    180 tablet    Take 1 tablet (80 mg) by mouth every 6 hours as needed for cramping    Flatulence, eructation and gas pain       sulfamethoxazole-trimethoprim 800-160 MG per tablet    BACTRIM DS/SEPTRA DS    60 tablet    Take 1 tablet by mouth 2 times daily    Spontaneous bacterial peritonitis (H)       thiamine 100 MG tablet     100 tablet    Take 1 tablet (100 mg) by mouth daily    Alcoholic cirrhosis of liver with ascites (H)       traZODone 50 MG tablet    DESYREL    30 tablet    Take 1 tablet (50 mg) by mouth nightly as needed for sleep    Primary insomnia       * Notice:  This list has 2 medication(s) that are the same as other medications prescribed for you. Read the directions carefully, and ask your doctor or other care provider to review them with you.

## 2017-09-06 NOTE — NURSING NOTE
Chief Complaint   Patient presents with     Medication Follow-up     Patient is here to follow up on medications.      Hospital F/U     Patient is here to follow up from [facility].      Lisa Ramírez LPN at 11:58 AM on 9/6/2017.

## 2017-09-06 NOTE — PROGRESS NOTES
SUBJECTIVE:    Pt is a 46 year old male with pmh of     Patient Active Problem List   Diagnosis     Type 2 diabetes mellitus (H)     Moderate major depression (H)     LENA (obstructive sleep apnea)-moderate (AHI 16)     Oligoastrocytoma of parietal lobe (H)     ADHD (attention deficit hyperactivity disorder)     Financial difficulties     Thrombocytopenia (H)     Partial epilepsy with impairment of consciousness (H)     Cirrhosis of liver (H)     Pulmonary nodules     Myopic astigmatism of both eyes     Presbyopia     Ringing in ears, unspecified laterality     GIB (gastrointestinal bleeding)     Portal vein thrombosis     Advance care planning     Chronic pain     Hyperlipidemia LDL goal <100     Pancytopenia (H)     Hypothyroidism     Malnutrition (H)     Hx of psychiatric care     Encephalopathy, hepatic (H)       who is here for evaluation of had concerns including Medication Follow-up and Hospital F/U.    Here for a f/u ER visit, care everywhere rvwd--took wifes meds including narcotics by accident got med trays mixed up, luckily his mother in law is a nurse practicioner, was there and figured it out, and called 911 even before he had symptoms--he passed out in the rig and got narcan per family, who is here too. Got more narcan and obs in ER, did well, went home, they've now figured out how to manage pills for both.     Neuropsych tests ordered but not arranged, will help reorder    Recent psych note rvwd, agreed w/ the test    Overall mild more confused per wife/mother in law who are both here. Turns out just using lactulose once/d, not twice as ordered, due to loose stools.    Allergies   Allergen Reactions     No Clinical Screening - See Comments      Coban and Surgilast cause itching     Tegaderm Transparent Dressing (Informational Only)      Latex Itching and Rash           Current Outpatient Prescriptions   Medication Sig Dispense Refill     HYDROcodone-acetaminophen (NORCO) 5-325 MG per tablet Take 2  tablets by mouth every 6 hours as needed for moderate to severe pain 60 tablet 0     cyanocobalamin (VITAMIN  B-12) 1000 MCG tablet 1 tablet (1,000 mcg) by Per G Tube route daily 130 tablet 2     sulfamethoxazole-trimethoprim (BACTRIM DS/SEPTRA DS) 800-160 MG per tablet Take 1 tablet by mouth 2 times daily 60 tablet 5     mirtazapine (REMERON) 15 MG tablet Take 2 tablets (30 mg) by mouth At Bedtime 30 tablet 3     gabapentin (NEURONTIN) 100 MG capsule Take 1 capsule (100 mg) by mouth At Bedtime 30 capsule 3     traZODone (DESYREL) 50 MG tablet Take 1 tablet (50 mg) by mouth nightly as needed for sleep 30 tablet 3     dulaglutide (TRULICITY) 1.5 MG/0.5ML pen Inject 1.5 mg Subcutaneous every 7 days 2 mL 1     pravastatin (PRAVACHOL) 80 MG tablet TAKE ONE TABLET BY MOUTH ONCE DAILY 90 tablet 1     mirtazapine (REMERON) 15 MG tablet Take 1 tablet (15 mg) at bedtime.**Must attend next appointment for further refills** 14 tablet 0     phenytoin (DILANTIN) 30 MG CR capsule Take 4 capsules (120 mg) by mouth 2 times daily 240 capsule 1     lactulose (CHRONULAC) 10 GM/15ML solution Take 7.5 mLs (5 g) by mouth 2 times daily 473 mL 1     simethicone (MYLICON) 80 MG chewable tablet Take 1 tablet (80 mg) by mouth every 6 hours as needed for cramping 180 tablet 1     cholecalciferol (VITAMIN  -D) 1000 UNITS capsule Take 1 capsule (1,000 Units) by mouth daily 90 capsule 1     thiamine 100 MG tablet Take 1 tablet (100 mg) by mouth daily 100 tablet 1     glipiZIDE (GLUCOTROL XL) 10 MG 24 hr tablet Take 1 tablet (10 mg) by mouth daily 90 tablet 1     hydrOXYzine (ATARAX) 25 MG tablet Take 1 tablet (25 mg) by mouth 2 times daily 180 tablet 1     rifaximin (XIFAXAN) 550 MG TABS tablet Take 1 tablet (550 mg) by mouth 2 times daily 60 tablet 11     blood glucose monitoring (ONE TOUCH ULTRA) test strip Use to test blood sugars 4 times daily as needed or as directed. 300 each 4     levothyroxine (SYNTHROID/LEVOTHROID) 88 MCG tablet Take 1  tablet (88 mcg) by mouth daily 90 tablet 3     omeprazole (PRILOSEC) 20 MG CR capsule Take 2 capsules (40 mg) by mouth 2 times daily 120 capsule 11     ferrous sulfate (IRON) 325 (65 FE) MG tablet Take 1 tablet (325 mg) by mouth 2 times daily 200 tablet 3     Calcium Carb-Cholecalciferol (CALCIUM 500 +D) 500-400 MG-UNIT TABS Take 500 mg by mouth daily 90 tablet 1     multivitamin, therapeutic with minerals (THERA-VIT-M) TABS Take 1 tablet by mouth 2 times daily       ONE TOUCH LANCETS MISC by In Vitro route 4 times daily as needed 100 each prn       Social History   Substance Use Topics     Smoking status: Never Smoker     Smokeless tobacco: Never Used     Alcohol use No      Comment: Quit 01/2014           OBJECTIVE:  /85  Pulse 75  Wt 93.4 kg (206 lb)  SpO2 94%  BMI 28.73 kg/m2  GENERAL APPEARANCE: Alert, no acute distress      ASSESSMENT/PLAN:    Mild confusion over baseline per family: labs, go back to bid lactulose  Do npsych tests    KAYODE LANGSTON MD

## 2017-09-08 ENCOUNTER — TELEPHONE (OUTPATIENT)
Dept: GASTROENTEROLOGY | Facility: CLINIC | Age: 47
End: 2017-09-08

## 2017-09-08 DIAGNOSIS — K74.69 OTHER CIRRHOSIS OF LIVER (H): Primary | Chronic | ICD-10-CM

## 2017-09-08 NOTE — TELEPHONE ENCOUNTER
Dr. Ramirez, Per your recommendations patient is due for follow up EGD. Please place order for scheduling.   Thanks Alexa HUITRON

## 2017-09-15 DIAGNOSIS — F41.9 ANXIETY: ICD-10-CM

## 2017-09-15 DIAGNOSIS — L29.9 ITCHING: ICD-10-CM

## 2017-09-18 RX ORDER — HYDROXYZINE HYDROCHLORIDE 25 MG/1
25 TABLET, FILM COATED ORAL 2 TIMES DAILY
Qty: 180 TABLET | Refills: 1 | Status: SHIPPED | OUTPATIENT
Start: 2017-09-18 | End: 2018-02-21

## 2017-09-18 NOTE — TELEPHONE ENCOUNTER
hydrOXYzine        Last Written Prescription Date:  4/23/17  Last Fill Quantity: 18/0,   # refills: 1  Last Office Visit : 9/6/17  Future Office visit:  0

## 2017-09-20 ENCOUNTER — TELEPHONE (OUTPATIENT)
Dept: INTERNAL MEDICINE | Facility: CLINIC | Age: 47
End: 2017-09-20

## 2017-09-20 NOTE — TELEPHONE ENCOUNTER
Called back with verbal auth of orders.  -------------------------------------------      ----- Message from Danielito Carias sent at 9/20/2017 12:36 PM CDT -----  Regarding: Verbal Order  Contact: [phone]  Angelique with [facility]  [phone]    Requesting verbal order for additonal OT home care visit.     1 X a week for 4 weeks.

## 2017-09-26 ENCOUNTER — TELEPHONE (OUTPATIENT)
Dept: GASTROENTEROLOGY | Facility: CLINIC | Age: 47
End: 2017-09-26

## 2017-09-26 NOTE — TELEPHONE ENCOUNTER
Message left for patient's wife to call back to schedule EGD with Dr. Ramirez. See order.    Idania Almanzar RN

## 2017-09-27 ENCOUNTER — TELEPHONE (OUTPATIENT)
Dept: INTERNAL MEDICINE | Facility: CLINIC | Age: 47
End: 2017-09-27

## 2017-09-27 ENCOUNTER — HOME INFUSION (PRE-WILLOW HOME INFUSION) (OUTPATIENT)
Dept: PHARMACY | Facility: CLINIC | Age: 47
End: 2017-09-27

## 2017-09-27 DIAGNOSIS — Z95.828 PORT CATHETER IN PLACE: Primary | ICD-10-CM

## 2017-09-27 NOTE — PROGRESS NOTES
Therapy: Line care  [place] and [place]  Ded: $3.10 monthly   Co-Insurance: 100% for line care  In case pt needs nursing-he must be homebound for coverage or would cost $332.50 per visit.    In reference to referral made through consult message to check line care coverage.     Please contact Intake with any questions, [phone] or In Basket pool, [facility] ([phone]).

## 2017-09-27 NOTE — TELEPHONE ENCOUNTER
Called back with verbal auth of orders.  Placed  home infusion referral for port flush supplies.  ------------------------------------        ----- Message from Wandy Loo sent at 9/27/2017 11:46 AM CDT -----  Regarding: home care order  Tsering from [facility] calling for home care orders: Please call [phone]    Skilled Nursing    1x 8 weeks  2 prns    Orders to flush port and an order to [facility] for the   supplies

## 2017-09-28 ENCOUNTER — MEDICAL CORRESPONDENCE (OUTPATIENT)
Dept: HEALTH INFORMATION MANAGEMENT | Facility: CLINIC | Age: 47
End: 2017-09-28

## 2017-09-28 DIAGNOSIS — Z53.9 ERRONEOUS ENCOUNTER--DISREGARD: Primary | ICD-10-CM

## 2017-09-28 NOTE — TELEPHONE ENCOUNTER
Levothyroxine 75 mcg tabs     Last Written Prescription Date: ***  Last Quantity: ***, # refills: ***  Last Office Visit : ***   Pending Visit:***      TSH   Date Value Ref Range Status   08/08/2017 7.01 (H) 0.40 - 4.00 mU/L Final

## 2017-10-06 ENCOUNTER — DOCUMENTATION ONLY (OUTPATIENT)
Dept: CARE COORDINATION | Facility: CLINIC | Age: 47
End: 2017-10-06

## 2017-10-06 DIAGNOSIS — E11.9 DM TYPE 2, GOAL HBA1C 7%-8% (H): ICD-10-CM

## 2017-10-06 NOTE — PROGRESS NOTES
[facility] utilizes an encounter to take the place of a direct phone call to your office. Please take a moment to review the below request. Your reply to this encounter will act as a verbal OK of orders if requested below. Thank you.    Patient Update:  FYI  Client had a fall in the kitchen yesterday.  Sink/dishwaher leaked onto the kitchen floor and client spilling in the water.  Reports pain to be present at the time of the fall, but does not have this pain today.  HC required to notify MD of all falls.      Also client reporting left wrist pain (not associated with fall yesterday).  States pain can get up to an 8.  Twisting motion is what causes the pain and this comes and goes..  No bruising, swelling, or warmth observed.  Recommended heat or ice to site. HC will continue to assess.     Angela Manley RN    [phone]  [email or web address]

## 2017-10-09 NOTE — TELEPHONE ENCOUNTER
dulaglutide (TRULICITY) 1.5 MG/0.5ML pen  Last Written Prescription Date: 8/16/17  Last Fill Quantity: 2ml,   # refills: 1  Last Office Visit with G, P or [facility] prescribing provider: 9/6/17  Future Office visit:  none  Creatinine   Date Value Ref Range Status   09/06/2017 0.87 0.66 - 1.25 mg/dL Final   ]  Lab Results   Component Value Date    A1C 8.5 08/08/2017    A1C 9.4 07/06/2017    A1C 6.6 03/03/2017    A1C 8.2 12/09/2016    A1C 8.6 10/13/2016     Lab Results   Component Value Date    MICROL 6 12/09/2016     No results found for: MICROALBUMIN      Routing refill request to provider for review/approval because:   dulaglutide (TRULICITY) 1.5 MG/0.5ML pen. microalbumin due  12/17.

## 2017-10-10 ENCOUNTER — TELEPHONE (OUTPATIENT)
Dept: PHARMACY | Facility: CLINIC | Age: 47
End: 2017-10-10

## 2017-10-10 NOTE — TELEPHONE ENCOUNTER
[facility] Prior Authorization Team   Phone: [phone]  Fax: [phone]      PA Initiation    Medication: dulaglutide (TRULICITY) 1.5 MG/0.5ML pen  Insurance Company: [employer] - Phone [phone] Fax [phone]  Pharmacy Filling the Rx: [facility] [facility] [address] [facility]  Filling Pharmacy Phone: [phone]  Filling Pharmacy Fax:    Start Date: 10/10/2017

## 2017-10-11 ENCOUNTER — PRE VISIT (OUTPATIENT)
Dept: NEPHROLOGY | Facility: CLINIC | Age: 47
End: 2017-10-11

## 2017-10-11 ENCOUNTER — TELEPHONE (OUTPATIENT)
Dept: GASTROENTEROLOGY | Facility: CLINIC | Age: 47
End: 2017-10-11

## 2017-10-11 NOTE — TELEPHONE ENCOUNTER
Message left for patient's spouse to call back to schedule follow up procedure.    Idania Almanzar RN

## 2017-10-11 NOTE — TELEPHONE ENCOUNTER
Was the patient contacted by phone and reminded of the upcoming visit? message left    Was the patient instructed to bring a current list of all medications to the appointment or instructed to bring in all medication bottles? Yes     Message left that no labs needed and lab appointment has been cancelled.    Patient instructed to arrive early for check-in    Kristen Don CMA  10/11/2017 2:49 PM

## 2017-10-13 ENCOUNTER — DOCUMENTATION ONLY (OUTPATIENT)
Dept: CARE COORDINATION | Facility: CLINIC | Age: 47
End: 2017-10-13

## 2017-10-13 DIAGNOSIS — K74.69 OTHER CIRRHOSIS OF LIVER (H): Primary | Chronic | ICD-10-CM

## 2017-10-13 NOTE — PROGRESS NOTES
[facility] utilizes an encounter to take the place of a direct phone call to your office. Please take a moment to review the below request. Your reply to this encounter will act as a verbal OK of orders if requested below. Thank you.    Patient Update:    Client has been having increased abdominal girth.  Abdomen feels very tight. Still not passing gas.  Reporting some pain on the right side towards the back that comes and goes. He rates this pain up to a 6. Confusion has been at baseline.  Has been taking lactulose daily with reported 3 to 6 bowels per day. Weight is up approx 6 to 8 lbs from last week.  Education provided on better monitoring of bowel frequency and taking second dose of lactulose if having less than 3 to 4 bowels before noon.      Also client has been having a dry cough for approx 5 days. Has been taking tessalon pearls and this does help somewhat.    Angela Manley RN    [phone]  [email or web address]

## 2017-10-16 NOTE — PROGRESS NOTES
Ultrasound ordered to check for presence of ascites vs gas vs other.     Mono Varghese - 10/13/2017 10:07 AM << Less Detail     Beatriz Tanner MD     Sent: Fri October 13, 2017 10:18 PM       To: Kwame Thomas LPN              Message        Order an ultrasound.              ----- Message -----          From: Angela Manley          Sent: 10/13/2017  10:22 AM            To: Beatriz Tanner MD, *              ----- Message from Angela Manley sent at 10/13/2017 10:22 AM CDT -----       Patient Concern/Patient Update

## 2017-10-16 NOTE — TELEPHONE ENCOUNTER
[facility] Prior Authorization Team   Phone: [phone]  Fax: [phone]      Prior Authorization Approval    Authorization Effective Date: 7/12/2017  Authorization Expiration Date: 10/10/2018  Medication: dulaglutide (TRULICITY) 1.5 MG/0.5ML pen  Approved Dose/Quantity:   Reference #:     Insurance Company: [employer] - Phone [phone] Fax [phone]  Expected CoPay: $0.00     CoPay Card Available:      Foundation Assistance Needed:    Which Pharmacy is filling the prescription (Not needed for infusion/clinic administered): [facility] [address] [place] [id number] [facility]  Pharmacy Notified: Yes  Patient Notified: Yes

## 2017-10-18 ENCOUNTER — OFFICE VISIT (OUTPATIENT)
Dept: GASTROENTEROLOGY | Facility: CLINIC | Age: 47
End: 2017-10-18
Attending: INTERNAL MEDICINE
Payer: MEDICARE

## 2017-10-18 VITALS
HEIGHT: 70 IN | TEMPERATURE: 98.3 F | BODY MASS INDEX: 30.78 KG/M2 | DIASTOLIC BLOOD PRESSURE: 88 MMHG | OXYGEN SATURATION: 96 % | SYSTOLIC BLOOD PRESSURE: 143 MMHG | WEIGHT: 215 LBS | HEART RATE: 78 BPM

## 2017-10-18 DIAGNOSIS — I85.00 ESOPHAGEAL VARICES WITHOUT BLEEDING, UNSPECIFIED ESOPHAGEAL VARICES TYPE (H): ICD-10-CM

## 2017-10-18 DIAGNOSIS — K76.82 HEPATIC ENCEPHALOPATHY (H): Primary | ICD-10-CM

## 2017-10-18 DIAGNOSIS — K74.60 CIRRHOSIS OF LIVER WITHOUT ASCITES, UNSPECIFIED HEPATIC CIRRHOSIS TYPE (H): ICD-10-CM

## 2017-10-18 PROCEDURE — 99212 OFFICE O/P EST SF 10 MIN: CPT | Mod: ZF

## 2017-10-18 ASSESSMENT — PAIN SCALES - GENERAL: PAINLEVEL: NO PAIN (0)

## 2017-10-18 NOTE — PROGRESS NOTES
MELD-Na score: 12 at 7/31/2017  6:47 AM  MELD score: 12 at 7/31/2017  6:47 AM  Calculated from:  Serum Creatinine: 0.82 mg/dL (Rounded to 1) at 7/31/2017  6:47 AM  Serum Sodium: 140 mmol/L (Rounded to 137) at 7/31/2017  6:47 AM  Total Bilirubin: 1.4 mg/dL at 7/31/2017  6:47 AM  INR(ratio): 1.45 at 7/29/2017 12:01 PM  Age: 46 years

## 2017-10-18 NOTE — PROGRESS NOTES
[facility]    Hepatology follow-up    Follow-up visit for cirrhosis of the liver.     Subjective:  Mr. Varghese is a 47-year-old  male whom we have seen for the last time together with Elizabet Darling who was our fellow who has since graduated, for his alcoholic cirrhosis.  He did have this decompensated, and his main issue was esophageal varices.  He got banded, and he also had hepatic encephalopathy which is currently maintained with lactulose and rifaximin.  He did have also diabetes which is not well controlled.  Since he was last seen with Elizabet on 04/18/2017, he did not have any hospital admission.  He continues to be very forgetful and confused, and at times, he has problems with bowel movements.  His wife helps him with his lactulose, and he moves his bowels in the 5 range.  At times, he has diarrhea.  His weight has remained stable.  His appetite is low, according to his mother-in-law who is with him.  He does not have any melena or hematemesis, but he has some bright red blood per rectum which comes with the stool at times.  He does not have any nausea or vomiting nor does he have any pruritus or jaundice.  His MELD score from today's labs was 12.  Patient denies melena, hematemesis or hematochezia. No fevers, sweats or chills.  Weight stable.      Medical hx Surgical hx   Past Medical History:   Diagnosis Date     Brain tumor (H)      Liver failure (H)       Past Surgical History:   Procedure Laterality Date     COLONOSCOPY N/A 11/27/2015    Procedure: COMBINED COLONOSCOPY, SINGLE OR MULTIPLE BIOPSY/POLYPECTOMY BY BIOPSY;  Surgeon: Ran Thurston MD;  Location:  GI     ESOPHAGOSCOPY, GASTROSCOPY, DUODENOSCOPY (EGD), COMBINED N/A 11/23/2015    Procedure: COMBINED ESOPHAGOSCOPY, GASTROSCOPY, DUODENOSCOPY (EGD);  Surgeon: Rocael Rizvi MD;  Location:  OR     ESOPHAGOSCOPY, GASTROSCOPY, DUODENOSCOPY (EGD), COMBINED N/A 1/25/2017    Procedure: COMBINED  ESOPHAGOSCOPY, GASTROSCOPY, DUODENOSCOPY (EGD), BIOPSY SINGLE OR MULTIPLE;  Surgeon: Rocael Tejada MD;  [facility]     ESOPHAGOSCOPY, GASTROSCOPY, DUODENOSCOPY (EGD), COMBINED N/A 3/30/2017    Procedure: COMBINED ESOPHAGOSCOPY, GASTROSCOPY, DUODENOSCOPY (EGD);  Surgeon: Jordana Ramirez MD;  Location: [facility] GI     OPTICAL TRACKING SYSTEM CRANIOTOMY, EXCISE TUMOR, COMBINED  6/6/2013    Procedure: COMBINED OPTICAL TRACKING SYSTEM CRANIOTOMY, EXCISE TUMOR;  Left Stealth Guided Craniotomy , Tumor Resection ;  Surgeon: J Carlso Aranda MD;  [facility]     ORTHOPEDIC SURGERY      hand surgery- right     SIGMOIDOSCOPY FLEXIBLE N/A 11/24/2015    Procedure: SIGMOIDOSCOPY FLEXIBLE;  Surgeon: Rocael Rizvi MD;  Location:  GI          Medications  Prior to Admission medications    Medication Sig Start Date End Date Taking? Authorizing Provider   dulaglutide (TRULICITY) 1.5 MG/0.5ML pen Inject 1.5 mg Subcutaneous every 7 days 10/10/17  Yes King Louis MD   hydrOXYzine (ATARAX) 25 MG tablet Take 1 tablet (25 mg) by mouth 2 times daily 9/18/17  Yes King Louis MD   HYDROcodone-acetaminophen (NORCO) 5-325 MG per tablet Take 2 tablets by mouth every 6 hours as needed for moderate to severe pain 9/1/17  Yes King Louis MD   cyanocobalamin (VITAMIN  B-12) 1000 MCG tablet 1 tablet (1,000 mcg) by Per G Tube route daily 8/30/17  Yes King Louis MD   sulfamethoxazole-trimethoprim (BACTRIM DS/SEPTRA DS) 800-160 MG per tablet Take 1 tablet by mouth 2 times daily 8/18/17  Yes King Louis MD   mirtazapine (REMERON) 15 MG tablet Take 2 tablets (30 mg) by mouth At Bedtime 8/17/17  Yes Noah Garcia MD   gabapentin (NEURONTIN) 100 MG capsule Take 1 capsule (100 mg) by mouth At Bedtime 8/17/17  Yes Noah Garcia MD   traZODone (DESYREL) 50 MG tablet Take 1 tablet (50 mg) by mouth nightly as needed for sleep 8/17/17  Yes Noah Garcia MD    pravastatin (PRAVACHOL) 80 MG tablet TAKE ONE TABLET BY MOUTH ONCE DAILY 8/13/17  Yes King Louis MD   mirtazapine (REMERON) 15 MG tablet Take 1 tablet (15 mg) at bedtime.**Must attend next appointment for further refills** 8/3/17  Yes Hunter Hutchinson MD   phenytoin (DILANTIN) 30 MG CR capsule Take 4 capsules (120 mg) by mouth 2 times daily 8/1/17  Yes Patrick Mcclellan MD   lactulose (CHRONULAC) 10 GM/15ML solution Take 7.5 mLs (5 g) by mouth 2 times daily 8/1/17  Yes Patrick Mcclellan MD   simethicone (MYLICON) 80 MG chewable tablet Take 1 tablet (80 mg) by mouth every 6 hours as needed for cramping 8/1/17  Yes Patrick Mcclellan MD   cholecalciferol (VITAMIN  -D) 1000 UNITS capsule Take 1 capsule (1,000 Units) by mouth daily 7/27/17  Yes Beatriz Tanner MD   thiamine 100 MG tablet Take 1 tablet (100 mg) by mouth daily 6/15/17  Yes King Louis MD   glipiZIDE (GLUCOTROL XL) 10 MG 24 hr tablet Take 1 tablet (10 mg) by mouth daily 4/28/17  Yes King Louis MD   rifaximin (XIFAXAN) 550 MG TABS tablet Take 1 tablet (550 mg) by mouth 2 times daily 4/19/17  Yes Beatriz Tanner MD   blood glucose monitoring (ONE TOUCH ULTRA) test strip Use to test blood sugars 4 times daily as needed or as directed. 4/17/17  Yes King Louis MD   levothyroxine (SYNTHROID/LEVOTHROID) 88 MCG tablet Take 1 tablet (88 mcg) by mouth daily 3/14/17  Yes King Louis MD   omeprazole (PRILOSEC) 20 MG CR capsule Take 2 capsules (40 mg) by mouth 2 times daily 12/26/16  Yes King Louis MD   ferrous sulfate (IRON) 325 (65 FE) MG tablet Take 1 tablet (325 mg) by mouth 2 times daily 10/31/16  Yes Trevon Henson MD   Calcium Carb-Cholecalciferol (CALCIUM 500 +D) 500-400 MG-UNIT TABS Take 500 mg by mouth daily 4/19/16  Yes Beatriz Tanner MD   multivitamin, therapeutic with minerals (THERA-VIT-M) TABS Take 1 tablet by mouth 2 times daily   Yes  "Reported, Patient   ONE TOUCH LANCETS MISC by In Vitro route 4 times daily as needed 12/23/13  Yes King Louis MD       Allergies  Allergies   Allergen Reactions     No Clinical Screening - See Comments      Coban and Surgilast cause itching     Tegaderm Transparent Dressing (Informational Only)      Latex Itching and Rash       Review of systems  A 10-point review of systems was negative    Examination  /88  Pulse 78  Temp 98.3  F (36.8  C) (Oral)  Ht 1.778 m (5' 10\")  Wt 97.5 kg (215 lb)  SpO2 96%  BMI 30.85 kg/m2  Body mass index is 30.85 kg/(m^2).    Gen- well, NAD, A+Ox3, normal color  Lym- no palpable LAD  CVS- RRR  RS- CTA  Abd- No hepatosplenomegaly.  Extr- hands normal, no BERNARDINO  Skin- no rash or jaundice  Neuro- no asterixis  Psych- normal mood    Laboratory  Lab Results   Component Value Date     09/06/2017    POTASSIUM 4.1 09/06/2017    CHLORIDE 106 09/06/2017    CO2 24 09/06/2017    BUN 9 09/06/2017    CR 0.87 09/06/2017       Lab Results   Component Value Date    BILITOTAL 1.0 09/06/2017    ALT 46 09/06/2017    AST 55 09/06/2017    ALKPHOS 196 09/06/2017       Lab Results   Component Value Date    ALBUMIN 3.3 09/06/2017    PROTTOTAL 7.1 09/06/2017        Lab Results   Component Value Date    WBC 1.4 09/06/2017    HGB 12.6 09/06/2017    MCV 96 09/06/2017    PLT 38 09/06/2017       Lab Results   Component Value Date    INR 1.45 07/29/2017     MELD-Na score: 12 at 7/31/2017  6:47 AM  MELD score: 12 at 7/31/2017  6:47 AM  Calculated from:  Serum Creatinine: 0.82 mg/dL (Rounded to 1) at 7/31/2017  6:47 AM  Serum Sodium: 140 mmol/L (Rounded to 137) at 7/31/2017  6:47 AM  Total Bilirubin: 1.4 mg/dL at 7/31/2017  6:47 AM  INR(ratio): 1.45 at 7/29/2017 12:01 PM  Age: 46 years     Radiology    Assessment and plan:  Mr. Varghese is a 47-year-old  male who has multiple medical problems.  This includes a brain cancer which was surgically removed and he had chemotherapy.  This was in " 2013.  For that reason, we did not go forward with transplantation.  He has abstained from alcoholic beverages and has stabilized relatively with his MELD score being in the 12 range.  He will locally need to do is EGDs, and he will have an abdominal ultrasound done today.  Regarding his labs, his creatinine is normal and his sodium was normal, total bilirubin was 1.4, but his INR was mildly elevated at 1.45.  He will need an ultrasound first, and then we will need to follow him here every 3 months.      This was a 25-minute visit of which more than 50% was spent in explaining to the patient what our plan of care was.  We answered all his questions.      cc:  Primary care physician       Beatriz Tanner MD  Hepatology  [facility]

## 2017-10-18 NOTE — MR AVS SNAPSHOT
After Visit Summary   10/18/2017    Mono Varghese    MRN: [id number]           Patient Information     Date Of Birth          1970        Visit Information        Provider Department      10/18/2017 9:50 AM Beatriz Tanner MD [facility] Hepatology         Follow-ups after your visit        Your next 10 appointments already scheduled     Oct 19, 2017  1:30 PM CDT   (Arrive by 1:15 PM)   Return Seizure with Anil English MD   [facility] Neurology ([facility])    [address]  [address]rd [facility] [id number] [phone]            Oct 19, 2017  2:15 PM CDT   Adult Med Follow UP with Devendra Almanzar MD   Psychiatry Clinic ([facility])    [place] [id number]  [address] [facility] [id number] [phone]            Oct 20, 2017  8:00 AM CDT   US ABDOMEN COMPLETE with [id number]   [facility] US ([facility])    [address] [place] [id number] [phone]           Please bring a list of your medicines (including vitamins, minerals and over-the-counter drugs). Also, tell your doctor about any allergies you may have. Wear comfortable clothes and leave your valuables at home.  Adults: No eating or drinking for 8 hours before the exam. You may take medicine with a small sip of water.  Children: - Children 6+ years: No food or drink for 6 hours before exam. - Children 1-5 years: No food or drink for 4 hours before exam. - Infants, breast-fed: may have breast milk up to 2 hours before exam. - Infants, formula: may have bottle until 4 hours before exam.  Please call the Imaging Department at your exam site with any questions.            Jan 04, 2018   Procedure with Jordana Ramirez MD   [facility] Endoscopy ([facility])    [address]  [facility] [id number] [phone]           The  [facility] is located on the corner of [facility] and [facility] on the [facility]. It is easily accessible from virtually any point in the [place] [place] area, via [address] and [address].            Apr 17, 2018  9:00 AM CDT   Lab with [facility] ([facility])    [address]  [address]st [facility] [address]   [phone]            Apr 17, 2018  9:40 AM CDT   (Arrive by 9:25 AM)   Return General Liver with Beatriz Tanner MD   [facility] Hepatology ([facility])    [address]  3rd Floor  [facility] [address]   [phone]            Apr 27, 2018 11:30 AM CDT   (Arrive by 11:15 AM)   MR BRAIN W/O & W CONTRAST with [address] MRI ([facility])    [address] [id number] [phone]           Take your medicines as usual, unless your doctor tells you not to. Bring a list of your current medicines to your exam (including vitamins, minerals and over-the-counter drugs).  You will be given intravenous contrast for this exam. To prepare:   The day before your exam, drink extra fluids at least six 8-ounce glasses (unless your doctor tells you to restrict your fluids).   Have a blood test (creatinine test) within 30 days of your exam. Go to your clinic or Diagnostic Imaging Department for this test.  The MRI machine uses a strong magnet. Please wear clothes without metal (snaps, zippers). A sweatsuit works well, or we may give you a hospital gown.  Please remove any body piercings and hair extensions before you arrive. You will also remove watches, jewelry, hairpins, wallets, dentures, partial dental plates and hearing aids. You may wear contact lenses, and you may be able to wear your rings. We have a safe place to keep your personal items, but it is safer to leave them at home.   **IMPORTANT** THE  INSTRUCTIONS BELOW ARE ONLY FOR THOSE PATIENTS WHO HAVE BEEN TOLD THEY WILL RECEIVE SEDATION OR GENERAL ANESTHESIA DURING THEIR MRI PROCEDURE:  IF YOU WILL RECEIVE SEDATION (take medicine to help you relax during your exam):   You must get the medicine from your doctor before you arrive. Bring the medicine to the exam. Do not take it at home.   Arrive one hour early. Bring someone who can take you home after the test. Your medicine will make you sleepy. After the exam, you may not drive, take a bus or take a taxi by yourself.   No eating 8 hours before your exam. You may have clear liquids up until 4 hours before your exam. (Clear liquids include water, clear tea, black coffee and fruit juice without pulp.)  IF YOU WILL RECEIVE ANESTHESIA (be asleep for your exam):   Arrive 1 1/2 hours early. Bring someone who can take you home after the test. You may not drive, take a bus or take a taxi by yourself.   No eating 8 hours before your exam. You may have clear liquids up until 4 hours before your exam. (Clear liquids include water, clear tea, black coffee and fruit juice without pulp.)  Please call the Imaging Department at your exam site with any questions.            May 04, 2018 11:00 AM CDT   (Arrive by 10:45 AM)   Return Visit with Lauro Shaw MD   [facility] ([facility])    [address] [place] [address] [phone]              Who to contact     If you have questions or need follow up information about today's clinic visit or your schedule please contact [facility] HEPATOLOGY directly at [phone].  Normal or non-critical lab and imaging results will be communicated to you by MyChart, letter or phone within 4 business days after the clinic has received the results. If you do not hear from us within 7 days, please contact the clinic through MyChart or phone. If you have a critical or abnormal lab result, we will notify you by phone as soon  "as possible.  Submit refill requests through [employer] or call your pharmacy and they will forward the refill request to us. Please allow 3 business days for your refill to be completed.          Additional Information About Your Visit        [employer] Information     [employer] gives you secure access to your electronic health record. If you see a primary care provider, you can also send messages to your care team and make appointments. If you have questions, please call your primary care clinic.  If you do not have a primary care provider, please call [phone] and they will assist you.        Care EveryWhere ID     This is your Care EveryWhere ID. This could be used by other organizations to access your [facility] medical records  [id number]        Your Vitals Were     Pulse Temperature Height Pulse Oximetry BMI (Body Mass Index)       78 98.3  F (36.8  C) (Oral) 1.778 m (5' 10\") 96% 30.85 kg/m2        Blood Pressure from Last 3 Encounters:   10/18/17 143/88   09/06/17 132/85   08/08/17 126/88    Weight from Last 3 Encounters:   10/18/17 97.5 kg (215 lb)   09/06/17 93.4 kg (206 lb)   08/08/17 95.7 kg (211 lb)              Today, you had the following     No orders found for display       Primary Care Provider Office Phone # Fax #    King Louis [phone]       [address] [address]        Equal Access to Services     [facility] : Hadii kevin ku hadhectoro Sojanna, waaxda luqadaha, qaybta kaalmada dorotheayaxin, josue tamayo. So [facility] [phone].    ATENCIÓN: Si habla español, tiene a chiang disposición servicios gratuitos de asistencia lingüística. Llame al [phone].    We comply with applicable federal civil rights laws and [place] laws. We do not discriminate on the basis of race, color, national origin, age, disability, sex, sexual orientation, or gender identity.            Thank you!     Thank you for choosing [facility] HEPATOLOGY  for " your care. Our goal is always to provide you with excellent care. Hearing back from our patients is one way we can continue to improve our services. Please take a few minutes to complete the written survey that you may receive in the mail after your visit with us. Thank you!             Your Updated Medication List - Protect others around you: Learn how to safely use, store and throw away your medicines at www.disposemymeds.org.          This list is accurate as of: 10/18/17 10:21 AM.  Always use your most recent med list.                   Brand Name Dispense Instructions for use Diagnosis    blood glucose monitoring test strip    ONE TOUCH ULTRA    300 each    Use to test blood sugars 4 times daily as needed or as directed.    Diabetes mellitus, type 2 (H)       Calcium Carb-Cholecalciferol 500-400 MG-UNIT Tabs    calcium 500 +D    90 tablet    Take 500 mg by mouth daily    Malnutrition (H)       cholecalciferol 1000 UNITS capsule    vitamin  -D    90 capsule    Take 1 capsule (1,000 Units) by mouth daily    Malnutrition (H)       cyanocobalamin 1000 MCG tablet    vitamin  B-12    130 tablet    1 tablet (1,000 mcg) by Per G Tube route daily    Alcoholic cirrhosis of liver with ascites (H)       dulaglutide 1.5 MG/0.5ML pen    TRULICITY    6 mL    Inject 1.5 mg Subcutaneous every 7 days    DM type 2, goal HbA1c 7%-8% (H)       ferrous sulfate 325 (65 FE) MG tablet    IRON    200 tablet    Take 1 tablet (325 mg) by mouth 2 times daily    Other iron deficiency anemia       gabapentin 100 MG capsule    NEURONTIN    30 capsule    Take 1 capsule (100 mg) by mouth At Bedtime    Mild episode of recurrent major depressive disorder (H)       glipiZIDE 10 MG 24 hr tablet    GLUCOTROL XL    90 tablet    Take 1 tablet (10 mg) by mouth daily    DM type 2, goal HbA1c 7%-8% (H)       HYDROcodone-acetaminophen 5-325 MG per tablet    NORCO    60 tablet    Take 2 tablets by mouth every 6 hours as needed for moderate to severe pain     Brain tumor (H)       hydrOXYzine 25 MG tablet    ATARAX    180 tablet    Take 1 tablet (25 mg) by mouth 2 times daily    Itching, Anxiety       lactulose 10 GM/15ML solution    CHRONULAC    473 mL    Take 7.5 mLs (5 g) by mouth 2 times daily    Hepatic encephalopathy (H)       levothyroxine 88 MCG tablet    SYNTHROID/LEVOTHROID    90 tablet    Take 1 tablet (88 mcg) by mouth daily    Hypothyroidism       * mirtazapine 15 MG tablet    REMERON    14 tablet    Take 1 tablet (15 mg) at bedtime.**Must attend next appointment for further refills**    Moderate episode of recurrent major depressive disorder (H)       * mirtazapine 15 MG tablet    REMERON    30 tablet    Take 2 tablets (30 mg) by mouth At Bedtime    Moderate episode of recurrent major depressive disorder (H)       multivitamin, therapeutic with minerals Tabs tablet      Take 1 tablet by mouth 2 times daily        omeprazole 20 MG CR capsule    priLOSEC    120 capsule    Take 2 capsules (40 mg) by mouth 2 times daily    Gastroesophageal reflux disease without esophagitis       ONE TOUCH LANCETS Misc     100 each    by In Vitro route 4 times daily as needed    Type II or unspecified type diabetes mellitus without mention of complication, not stated as uncontrolled       phenytoin 30 MG CR capsule    DILANTIN    240 capsule    Take 4 capsules (120 mg) by mouth 2 times daily    Oligoastrocytoma of parietal lobe (H)       pravastatin 80 MG tablet    PRAVACHOL    90 tablet    TAKE ONE TABLET BY MOUTH ONCE DAILY    High cholesterol       rifaximin 550 MG Tabs tablet    XIFAXAN    60 tablet    Take 1 tablet (550 mg) by mouth 2 times daily    Hepatic encephalopathy (H)       simethicone 80 MG chewable tablet    MYLICON    180 tablet    Take 1 tablet (80 mg) by mouth every 6 hours as needed for cramping    Flatulence, eructation and gas pain       sulfamethoxazole-trimethoprim 800-160 MG per tablet    BACTRIM DS/SEPTRA DS    60 tablet    Take 1 tablet by mouth 2  times daily    Spontaneous bacterial peritonitis (H)       thiamine 100 MG tablet     100 tablet    Take 1 tablet (100 mg) by mouth daily    Alcoholic cirrhosis of liver with ascites (H)       traZODone 50 MG tablet    DESYREL    30 tablet    Take 1 tablet (50 mg) by mouth nightly as needed for sleep    Primary insomnia       * Notice:  This list has 2 medication(s) that are the same as other medications prescribed for you. Read the directions carefully, and ask your doctor or other care provider to review them with you.

## 2017-10-18 NOTE — NURSING NOTE
Chief Complaint   Patient presents with     RECHECK     Cirrhosis of Liver   Pt roomed, vitals, meds, and allergies reviewed with pt. Pt ready for provider.  Mikey Rowland, CMA

## 2017-10-18 NOTE — LETTER
10/18/2017      RE: Mono Varghese  [address] [address] [facility] [address]           [facility]    Hepatology follow-up    Follow-up visit for cirrhosis of the liver.     Subjective:  Mr. Varghese is a 47-year-old  male whom we have seen for the last time together with Elizabet Barryney who was our fellow who has since graduated, for his alcoholic cirrhosis.  He did have this decompensated, and his main issue was esophageal varices.  He got banded, and he also had hepatic encephalopathy which is currently maintained with lactulose and rifaximin.  He did have also diabetes which is not well controlled.  Since he was last seen with Elizabet on 04/18/2017, he did not have any hospital admission.  He continues to be very forgetful and confused, and at times, he has problems with bowel movements.  His wife helps him with his lactulose, and he moves his bowels in the 5 range.  At times, he has diarrhea.  His weight has remained stable.  His appetite is low, according to his mother-in-law who is with him.  He does not have any melena or hematemesis, but he has some bright red blood per rectum which comes with the stool at times.  He does not have any nausea or vomiting nor does he have any pruritus or jaundice.  His MELD score from today's labs was 12.  Patient denies melena, hematemesis or hematochezia. No fevers, sweats or chills.  Weight stable.      Medical hx Surgical hx   Past Medical History:   Diagnosis Date     Brain tumor (H)      Liver failure (H)       Past Surgical History:   Procedure Laterality Date     COLONOSCOPY N/A 11/27/2015    Procedure: COMBINED COLONOSCOPY, SINGLE OR MULTIPLE BIOPSY/POLYPECTOMY BY BIOPSY;  Surgeon: Ran Thurston MD;  [facility]     ESOPHAGOSCOPY, GASTROSCOPY, DUODENOSCOPY (EGD), COMBINED N/A 11/23/2015    Procedure: COMBINED ESOPHAGOSCOPY, GASTROSCOPY, DUODENOSCOPY (EGD);  Surgeon: Rocael Rizvi MD;  [facility]     ESOPHAGOSCOPY,  GASTROSCOPY, DUODENOSCOPY (EGD), COMBINED N/A 1/25/2017    Procedure: COMBINED ESOPHAGOSCOPY, GASTROSCOPY, DUODENOSCOPY (EGD), BIOPSY SINGLE OR MULTIPLE;  Surgeon: Rocael Tejada MD;  [facility]     ESOPHAGOSCOPY, GASTROSCOPY, DUODENOSCOPY (EGD), COMBINED N/A 3/30/2017    Procedure: COMBINED ESOPHAGOSCOPY, GASTROSCOPY, DUODENOSCOPY (EGD);  Surgeon: Jordana Ramirez MD;  Location:  GI     OPTICAL TRACKING SYSTEM CRANIOTOMY, EXCISE TUMOR, COMBINED  6/6/2013    Procedure: COMBINED OPTICAL TRACKING SYSTEM CRANIOTOMY, EXCISE TUMOR;  Left Stealth Guided Craniotomy , Tumor Resection ;  Surgeon: J Carlos Aranda MD;  [facility]     ORTHOPEDIC SURGERY      hand surgery- right     SIGMOIDOSCOPY FLEXIBLE N/A 11/24/2015    Procedure: SIGMOIDOSCOPY FLEXIBLE;  Surgeon: Rocael Rizvi MD;  Location:  GI          Medications  Prior to Admission medications    Medication Sig Start Date End Date Taking? Authorizing Provider   dulaglutide (TRULICITY) 1.5 MG/0.5ML pen Inject 1.5 mg Subcutaneous every 7 days 10/10/17  Yes King Louis MD   hydrOXYzine (ATARAX) 25 MG tablet Take 1 tablet (25 mg) by mouth 2 times daily 9/18/17  Yes King Louis MD   HYDROcodone-acetaminophen (NORCO) 5-325 MG per tablet Take 2 tablets by mouth every 6 hours as needed for moderate to severe pain 9/1/17  Yes King Louis MD   cyanocobalamin (VITAMIN  B-12) 1000 MCG tablet 1 tablet (1,000 mcg) by Per G Tube route daily 8/30/17  Yes King Louis MD   sulfamethoxazole-trimethoprim (BACTRIM DS/SEPTRA DS) 800-160 MG per tablet Take 1 tablet by mouth 2 times daily 8/18/17  Yes King Louis MD   mirtazapine (REMERON) 15 MG tablet Take 2 tablets (30 mg) by mouth At Bedtime 8/17/17  Yes Noah Garcia MD   gabapentin (NEURONTIN) 100 MG capsule Take 1 capsule (100 mg) by mouth At Bedtime 8/17/17  Yes Noah Garcia MD   traZODone (DESYREL) 50 MG tablet Take 1 tablet (50 mg) by  mouth nightly as needed for sleep 8/17/17  Yes Noah Garcia MD   pravastatin (PRAVACHOL) 80 MG tablet TAKE ONE TABLET BY MOUTH ONCE DAILY 8/13/17  Yes King Louis MD   mirtazapine (REMERON) 15 MG tablet Take 1 tablet (15 mg) at bedtime.**Must attend next appointment for further refills** 8/3/17  Yes Hunter Hutchinson MD   phenytoin (DILANTIN) 30 MG CR capsule Take 4 capsules (120 mg) by mouth 2 times daily 8/1/17  Yes Patrick Mcclellan MD   lactulose (CHRONULAC) 10 GM/15ML solution Take 7.5 mLs (5 g) by mouth 2 times daily 8/1/17  Yes Patrick Mcclellan MD   simethicone (MYLICON) 80 MG chewable tablet Take 1 tablet (80 mg) by mouth every 6 hours as needed for cramping 8/1/17  Yes Patrick Mcclellan MD   cholecalciferol (VITAMIN  -D) 1000 UNITS capsule Take 1 capsule (1,000 Units) by mouth daily 7/27/17  Yes Beatriz Tanner MD   thiamine 100 MG tablet Take 1 tablet (100 mg) by mouth daily 6/15/17  Yes King Louis MD   glipiZIDE (GLUCOTROL XL) 10 MG 24 hr tablet Take 1 tablet (10 mg) by mouth daily 4/28/17  Yes King Louis MD   rifaximin (XIFAXAN) 550 MG TABS tablet Take 1 tablet (550 mg) by mouth 2 times daily 4/19/17  Yes Beatriz Tanner MD   blood glucose monitoring (ONE TOUCH ULTRA) test strip Use to test blood sugars 4 times daily as needed or as directed. 4/17/17  Yes King Louis MD   levothyroxine (SYNTHROID/LEVOTHROID) 88 MCG tablet Take 1 tablet (88 mcg) by mouth daily 3/14/17  Yes King Louis MD   omeprazole (PRILOSEC) 20 MG CR capsule Take 2 capsules (40 mg) by mouth 2 times daily 12/26/16  Yes King Louis MD   ferrous sulfate (IRON) 325 (65 FE) MG tablet Take 1 tablet (325 mg) by mouth 2 times daily 10/31/16  Yes Trevon Henson MD   Calcium Carb-Cholecalciferol (CALCIUM 500 +D) 500-400 MG-UNIT TABS Take 500 mg by mouth daily 4/19/16  Yes Beatriz Tanner MD   multivitamin, therapeutic with  "minerals (THERA-VIT-M) TABS Take 1 tablet by mouth 2 times daily   Yes Reported, Patient   ONE TOUCH LANCETS MISC by In Vitro route 4 times daily as needed 12/23/13  Yes King Louis MD       Allergies  Allergies   Allergen Reactions     No Clinical Screening - See Comments      Coban and Surgilast cause itching     Tegaderm Transparent Dressing (Informational Only)      Latex Itching and Rash       Review of systems  A 10-point review of systems was negative    Examination  /88  Pulse 78  Temp 98.3  F (36.8  C) (Oral)  Ht 1.778 m (5' 10\")  Wt 97.5 kg (215 lb)  SpO2 96%  BMI 30.85 kg/m2  Body mass index is 30.85 kg/(m^2).    Gen- well, NAD, A+Ox3, normal color  Lym- no palpable LAD  CVS- RRR  RS- CTA  Abd- No hepatosplenomegaly.  Extr- hands normal, no BERNARDINO  Skin- no rash or jaundice  Neuro- no asterixis  Psych- normal mood    Laboratory  Lab Results   Component Value Date     09/06/2017    POTASSIUM 4.1 09/06/2017    CHLORIDE 106 09/06/2017    CO2 24 09/06/2017    BUN 9 09/06/2017    CR 0.87 09/06/2017       Lab Results   Component Value Date    BILITOTAL 1.0 09/06/2017    ALT 46 09/06/2017    AST 55 09/06/2017    ALKPHOS 196 09/06/2017       Lab Results   Component Value Date    ALBUMIN 3.3 09/06/2017    PROTTOTAL 7.1 09/06/2017        Lab Results   Component Value Date    WBC 1.4 09/06/2017    HGB 12.6 09/06/2017    MCV 96 09/06/2017    PLT 38 09/06/2017       Lab Results   Component Value Date    INR 1.45 07/29/2017     MELD-Na score: 12 at 7/31/2017  6:47 AM  MELD score: 12 at 7/31/2017  6:47 AM  Calculated from:  Serum Creatinine: 0.82 mg/dL (Rounded to 1) at 7/31/2017  6:47 AM  Serum Sodium: 140 mmol/L (Rounded to 137) at 7/31/2017  6:47 AM  Total Bilirubin: 1.4 mg/dL at 7/31/2017  6:47 AM  INR(ratio): 1.45 at 7/29/2017 12:01 PM  Age: 46 years     Radiology    Assessment and plan:  Mr. Varghese is a 47-year-old  male who has multiple medical problems.  This includes a brain " cancer which was surgically removed and he had chemotherapy.  This was in 2013.  For that reason, we did not go forward with transplantation.  He has abstained from alcoholic beverages and has stabilized relatively with his MELD score being in the 12 range.  He will locally need to do is EGDs, and he will have an abdominal ultrasound done today.  Regarding his labs, his creatinine is normal and his sodium was normal, total bilirubin was 1.4, but his INR was mildly elevated at 1.45.  He will need an ultrasound first, and then we will need to follow him here every 3 months.      This was a 25-minute visit of which more than 50% was spent in explaining to the patient what our plan of care was.  We answered all his questions.      cc:  Primary care physician       Beatriz Tanner MD  Hepatology  [facility]    MELD-Na score: 12 at 7/31/2017  6:47 AM  MELD score: 12 at 7/31/2017  6:47 AM  Calculated from:  Serum Creatinine: 0.82 mg/dL (Rounded to 1) at 7/31/2017  6:47 AM  Serum Sodium: 140 mmol/L (Rounded to 137) at 7/31/2017  6:47 AM  Total Bilirubin: 1.4 mg/dL at 7/31/2017  6:47 AM  INR(ratio): 1.45 at 7/29/2017 12:01 PM  Age: 46 years      Beatriz Tanner MD

## 2017-10-19 ENCOUNTER — OFFICE VISIT (OUTPATIENT)
Dept: PSYCHIATRY | Facility: CLINIC | Age: 47
End: 2017-10-19
Attending: PSYCHIATRY & NEUROLOGY
Payer: MEDICARE

## 2017-10-19 ENCOUNTER — OFFICE VISIT (OUTPATIENT)
Dept: NEUROLOGY | Facility: CLINIC | Age: 47
End: 2017-10-19

## 2017-10-19 VITALS — SYSTOLIC BLOOD PRESSURE: 144 MMHG | HEIGHT: 70 IN | DIASTOLIC BLOOD PRESSURE: 77 MMHG | HEART RATE: 81 BPM

## 2017-10-19 VITALS
DIASTOLIC BLOOD PRESSURE: 91 MMHG | BODY MASS INDEX: 30.91 KG/M2 | HEART RATE: 79 BPM | WEIGHT: 215.4 LBS | SYSTOLIC BLOOD PRESSURE: 148 MMHG

## 2017-10-19 DIAGNOSIS — F32.4 MAJOR DEPRESSIVE DISORDER WITH SINGLE EPISODE, IN PARTIAL REMISSION (H): Primary | ICD-10-CM

## 2017-10-19 DIAGNOSIS — G40.209 PARTIAL SYMPTOMATIC EPILEPSY WITH COMPLEX PARTIAL SEIZURES, NOT INTRACTABLE, WITHOUT STATUS EPILEPTICUS (H): ICD-10-CM

## 2017-10-19 DIAGNOSIS — G40.209 PARTIAL SYMPTOMATIC EPILEPSY WITH COMPLEX PARTIAL SEIZURES, NOT INTRACTABLE, WITHOUT STATUS EPILEPTICUS (H): Primary | ICD-10-CM

## 2017-10-19 LAB — PHENYTOIN SERPL-MCNC: 9.4 MG/L (ref 10–20)

## 2017-10-19 PROCEDURE — 99212 OFFICE O/P EST SF 10 MIN: CPT | Mod: ZF

## 2017-10-19 PROCEDURE — 80185 ASSAY OF PHENYTOIN TOTAL: CPT | Performed by: PSYCHIATRY & NEUROLOGY

## 2017-10-19 RX ORDER — MIRTAZAPINE 45 MG/1
45 TABLET, FILM COATED ORAL AT BEDTIME
Qty: 30 TABLET | Refills: 1 | Status: SHIPPED | OUTPATIENT
Start: 2017-10-19 | End: 2017-12-28

## 2017-10-19 RX ORDER — METHYLPHENIDATE HYDROCHLORIDE 10 MG/1
10 TABLET ORAL 2 TIMES DAILY
Qty: 60 TABLET | Refills: 0 | Status: SHIPPED | OUTPATIENT
Start: 2017-10-19 | End: 2017-11-03

## 2017-10-19 ASSESSMENT — PAIN SCALES - GENERAL: PAINLEVEL: MILD PAIN (2)

## 2017-10-19 NOTE — MR AVS SNAPSHOT
After Visit Summary   10/19/2017    Mono Varghese    MRN: [id number]           Patient Information     Date Of Birth          1970        Visit Information        Provider Department      10/19/2017 1:30 PM Anil English MD [facility] Neurology        Today's Diagnoses     Partial symptomatic epilepsy with complex partial seizures, not intractable, without status epilepticus (H)    -  1       Follow-ups after your visit        Your next 10 appointments already scheduled     Nov 17, 2017  2:30 PM CST   New Visit with King Quiles DPM   [facility] ([facility])    [address] [place] [id number]   [phone]            Jan 04, 2018   Procedure with Jordana Ramirez MD   [facility] [place] Endoscopy ([facility])    [address] [facility] [id number] [phone]           The [facility] is located on the corner of [address] and [address] [facility] on the [facility]. It is easily accessible from virtually any point in the [facility] area, via [address] and [address].            Apr 17, 2018  9:00 AM CDT   Lab with  LAB   [facility] ([facility])    [address] [place] [address]  [address] [facility] [id number] [phone]            Apr 17, 2018  9:40 AM CDT   (Arrive by 9:25 AM)   Return General Liver with Beatriz Tanner MD   [facility] Hepatology ([facility])    [address] [place] [address]  [address] [facility] [id number] [phone]            Apr 27, 2018 11:30 AM CDT   (Arrive by 11:15 AM)   MR BRAIN W/O & W CONTRAST with [address] [facility] MRI ([facility])    [address] [id number] [phone]           Take your medicines as usual, unless your doctor tells you not to. Bring a list of your current medicines to  your exam (including vitamins, minerals and over-the-counter drugs).  You will be given intravenous contrast for this exam. To prepare:   The day before your exam, drink extra fluids at least six 8-ounce glasses (unless your doctor tells you to restrict your fluids).   Have a blood test (creatinine test) within 30 days of your exam. Go to your clinic or Diagnostic Imaging Department for this test.  The MRI machine uses a strong magnet. Please wear clothes without metal (snaps, zippers). A sweatsuit works well, or we may give you a hospital gown.  Please remove any body piercings and hair extensions before you arrive. You will also remove watches, jewelry, hairpins, wallets, dentures, partial dental plates and hearing aids. You may wear contact lenses, and you may be able to wear your rings. We have a safe place to keep your personal items, but it is safer to leave them at home.   **IMPORTANT** THE INSTRUCTIONS BELOW ARE ONLY FOR THOSE PATIENTS WHO HAVE BEEN TOLD THEY WILL RECEIVE SEDATION OR GENERAL ANESTHESIA DURING THEIR MRI PROCEDURE:  IF YOU WILL RECEIVE SEDATION (take medicine to help you relax during your exam):   You must get the medicine from your doctor before you arrive. Bring the medicine to the exam. Do not take it at home.   Arrive one hour early. Bring someone who can take you home after the test. Your medicine will make you sleepy. After the exam, you may not drive, take a bus or take a taxi by yourself.   No eating 8 hours before your exam. You may have clear liquids up until 4 hours before your exam. (Clear liquids include water, clear tea, black coffee and fruit juice without pulp.)  IF YOU WILL RECEIVE ANESTHESIA (be asleep for your exam):   Arrive 1 1/2 hours early. Bring someone who can take you home after the test. You may not drive, take a bus or take a taxi by yourself.   No eating 8 hours before your exam. You may have clear liquids up until 4 hours before your exam. (Clear liquids include  "water, clear tea, black coffee and fruit juice without pulp.)  Please call the Imaging Department at your exam site with any questions.            May 04, 2018 11:00 AM CDT   (Arrive by 10:45 AM)   Return Visit with Lauro Shaw MD   [facility] ([facility])    [address] [id number] [phone]              Who to contact     Please call your clinic at [phone] to:    Ask questions about your health    Make or cancel appointments    Discuss your medicines    Learn about your test results    Speak to your doctor   If you have compliments or concerns about an experience at your clinic, or if you wish to file a complaint, please contact [facility] Patient Relations at [phone] or email us at [email or web address]         Additional Information About Your Visit        [email or web address] Information     [employer] gives you secure access to your electronic health record. If you see a primary care provider, you can also send messages to your care team and make appointments. If you have questions, please call your primary care clinic.  If you do not have a primary care provider, please call [phone] and they will assist you.      [employer] is an electronic gateway that provides easy, online access to your medical records. With [employer], you can request a clinic appointment, read your test results, renew a prescription or communicate with your care team.     To access your existing account, please contact your [facility] or call [phone] for assistance.        Care EveryWhere ID     This is your Care EveryWhere ID. This could be used by other organizations to access your [facility] medical records  [id number]        Your Vitals Were     Pulse Height                81 1.778 m (5' 10\")           Blood Pressure from Last 3 Encounters:   10/19/17 144/77   10/18/17 143/88   09/06/17 " 132/85    Weight from Last 3 Encounters:   10/18/17 97.5 kg (215 lb)   09/06/17 93.4 kg (206 lb)   08/08/17 95.7 kg (211 lb)                 Today's Medication Changes          These changes are accurate as of: 10/19/17 11:59 PM.  If you have any questions, ask your nurse or doctor.               Start taking these medicines.        Dose/Directions    methylphenidate 10 MG tablet   Commonly known as:  RITALIN   Used for:  Major depressive disorder with single episode, in partial remission (H)   Started by:  Devendra Almanzar MD        Dose:  10 mg   Take 1 tablet (10 mg) by mouth 2 times daily   Quantity:  60 tablet   Refills:  0         These medicines have changed or have updated prescriptions.        Dose/Directions    mirtazapine 45 MG tablet   Commonly known as:  REMERON   This may have changed:    - medication strength  - how much to take  - how to take this  - when to take this  - additional instructions  - Another medication with the same name was removed. Continue taking this medication, and follow the directions you see here.   Used for:  Major depressive disorder with single episode, in partial remission (H)   Changed by:  Devendra Almanzar MD        Dose:  45 mg   Take 1 tablet (45 mg) by mouth At Bedtime   Quantity:  30 tablet   Refills:  1            Where to get your medicines      These medications were sent to [facility] [address] [place] [address] [facility]  [address] [facility] [address]     Phone:  [phone]     mirtazapine 45 MG tablet         Some of these will need a paper prescription and others can be bought over the counter.  Ask your nurse if you have questions.     Bring a paper prescription for each of these medications     methylphenidate 10 MG tablet                Primary Care Provider Office Phone # Fax #    King Louis [phone]        [address] [address]        Equal Access to Services     SAMUEL FAIR AH: Jennifer  kevin Thomas, waaniada ranjoey, qaybta kajie melindalyly, waxclementine sweta calderonseradenise roque roni. So [facility] [phone].    ATENCIÓN: Si habla jose juan, tiene a chiang disposición servicios gratuitos de asistencia lingüística. Ryaname al [phone].    We comply with applicable federal civil rights laws and [place] laws. We do not discriminate on the basis of race, color, national origin, age, disability, sex, sexual orientation, or gender identity.            Thank you!     Thank you for choosing [facility] NEUROLOGY  for your care. Our goal is always to provide you with excellent care. Hearing back from our patients is one way we can continue to improve our services. Please take a few minutes to complete the written survey that you may receive in the mail after your visit with us. Thank you!             Your Updated Medication List - Protect others around you: Learn how to safely use, store and throw away your medicines at www.disposemymeds.org.          This list is accurate as of: 10/19/17 11:59 PM.  Always use your most recent med list.                   Brand Name Dispense Instructions for use Diagnosis    blood glucose monitoring test strip    ONETOUCH ULTRA    300 each    Use to test blood sugars 4 times daily as needed or as directed.    Diabetes mellitus, type 2 (H)       Calcium Carb-Cholecalciferol 500-400 MG-UNIT Tabs    calcium 500 +D    90 tablet    Take 500 mg by mouth daily    Malnutrition (H)       cholecalciferol 1000 UNITS capsule    vitamin  -D    90 capsule    Take 1 capsule (1,000 Units) by mouth daily    Malnutrition (H)       cyanocobalamin 1000 MCG tablet    vitamin  B-12    130 tablet    1 tablet (1,000 mcg) by Per G Tube route daily    Alcoholic cirrhosis of liver with ascites (H)       dulaglutide 1.5 MG/0.5ML pen    TRULICITY    6 mL    Inject 1.5 mg Subcutaneous every 7 days    DM type 2, goal HbA1c 7%-8% (H)       ferrous sulfate 325 (65 FE) MG tablet    IRON    200 tablet     Take 1 tablet (325 mg) by mouth 2 times daily    Other iron deficiency anemia       gabapentin 100 MG capsule    NEURONTIN    30 capsule    Take 1 capsule (100 mg) by mouth At Bedtime    Mild episode of recurrent major depressive disorder (H)       HYDROcodone-acetaminophen 5-325 MG per tablet    NORCO    60 tablet    Take 2 tablets by mouth every 6 hours as needed for moderate to severe pain    Brain tumor (H)       hydrOXYzine 25 MG tablet    ATARAX    180 tablet    Take 1 tablet (25 mg) by mouth 2 times daily    Itching, Anxiety       lactulose 10 GM/15ML solution    CHRONULAC    473 mL    Take 7.5 mLs (5 g) by mouth 2 times daily    Hepatic encephalopathy (H)       levothyroxine 88 MCG tablet    SYNTHROID/LEVOTHROID    90 tablet    Take 1 tablet (88 mcg) by mouth daily    Hypothyroidism       methylphenidate 10 MG tablet    RITALIN    60 tablet    Take 1 tablet (10 mg) by mouth 2 times daily    Major depressive disorder with single episode, in partial remission (H)       mirtazapine 45 MG tablet    REMERON    30 tablet    Take 1 tablet (45 mg) by mouth At Bedtime    Major depressive disorder with single episode, in partial remission (H)       multivitamin, therapeutic with minerals Tabs tablet      Take 1 tablet by mouth 2 times daily        ONETOUCH LANCETS Misc     100 each    by In Vitro route 4 times daily as needed    Type II or unspecified type diabetes mellitus without mention of complication, not stated as uncontrolled       phenytoin 30 MG CR capsule    DILANTIN    240 capsule    Take 4 capsules (120 mg) by mouth 2 times daily    Oligoastrocytoma of parietal lobe (H)       pravastatin 80 MG tablet    PRAVACHOL    90 tablet    TAKE ONE TABLET BY MOUTH ONCE DAILY    High cholesterol       rifaximin 550 MG Tabs tablet    XIFAXAN    60 tablet    Take 1 tablet (550 mg) by mouth 2 times daily    Hepatic encephalopathy (H)       simethicone 80 MG chewable tablet    MYLICON    180 tablet    Take 1 tablet (80  mg) by mouth every 6 hours as needed for cramping    Flatulence, eructation and gas pain       sulfamethoxazole-trimethoprim 800-160 MG per tablet    BACTRIM DS/SEPTRA DS    60 tablet    Take 1 tablet by mouth 2 times daily    Spontaneous bacterial peritonitis (H)       thiamine 100 MG tablet     100 tablet    Take 1 tablet (100 mg) by mouth daily    Alcoholic cirrhosis of liver with ascites (H)       traZODone 50 MG tablet    DESYREL    30 tablet    Take 1 tablet (50 mg) by mouth nightly as needed for sleep    Primary insomnia

## 2017-10-19 NOTE — PROGRESS NOTES
2017      King Louis MD   [facility]    [address] [place] [address] [address]   [place] [address]      RE: Salo Madera   MRN: [id number]   : 1970      Dear Dr. Louis:        Mr. Madera is 47, has had a left parietal glioma removed.  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  He also has liver disease, minor depressive disorder.      He comes today accompanied by his mother-in-law.  We have followed his Dilantin level which has been very low and we have increased it so he is at 120 mg of Dilantin twice a day.  His last concentration was much better than before at 7.4.  Since he is at a higher concentration it seems as though he has had no episodes of speech arrest which the mother-in-law identified.  He carries other diagnoses of hepatic encephalopathy, partial epilepsy with loss of consciousness, ADD, astrocytoma of the left parietal lobe, major depression, history of psychiatric care, cytopenia.  He has had a history of liver failure.      His examination shows Salo to be pleasant as usual.  His eye movements are not impaired.      He has had no seizures since last seen.  His last concentration of Dilantin was good at 7.1.      In summary, he is seizure-free on the higher concentration of Dilantin.  He is close to therapeutic as he can be.      He is happy with this fact and would be seen for followup.  Check a concentration today.      Sincerely,       MD SAAR Romeo MD             D: 10/19/2017 15:37   T: 10/19/2017 16:16   MT: ANGÉLICA      Name:     SALO MADERA   MRN:      [id number]        Account:      [id number]   :      1970           Service Date: 10/19/2017      Document: [id number]

## 2017-10-19 NOTE — LETTER
10/19/2017       RE: Salo Madera  [address] [place] [facility] [address]     Dear Colleague,    Thank you for referring your patient, Salo Madera, to the [facility] NEUROLOGY at [facility]. Please see a copy of my visit note below.    2017      King Louis MD   Primary Care Center    [address] [place] [address] [address]   [place] [address]      RE: Salo Madera   MRN: [id number]   : 1970      Dear Dr. Louis:        Mr. Madera is 47, has had a left parietal glioma removed.  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  He also has liver disease, minor depressive disorder.      He comes today accompanied by his mother-in-law.  We have followed his Dilantin level which has been very low and we have increased it so he is at 120 mg of Dilantin twice a day.  His last concentration was much better than before at 7.4.  Since he is at a higher concentration it seems as though he has had no episodes of speech arrest which the mother-in-law identified.  He carries other diagnoses of hepatic encephalopathy, partial epilepsy with loss of consciousness, ADD, astrocytoma of the left parietal lobe, major depression, history of psychiatric care, cytopenia.  He has had a history of liver failure.      His examination shows Salo to be pleasant as usual.  His eye movements are not impaired.      He has had no seizures since last seen.  His last concentration of Dilantin was good at 7.1.      In summary, he is seizure-free on the higher concentration of Dilantin.  He is close to therapeutic as he can be.      He is happy with this fact and would be seen for followup.  Check a concentration today.      Sincerely,       MD SARA Romeo MD             D: 10/19/2017 15:37   T: 10/19/2017 16:16   MT: ANGÉLICA      Name:     SALO MADERA   MRN:      [id number]        Account:      [id number]   :      1970            Service Date: 10/19/2017      Document: [id number]       Again, thank you for allowing me to participate in the care of your patient.      Sincerely,    Anil English MD

## 2017-10-19 NOTE — MR AVS SNAPSHOT
After Visit Summary   10/19/2017    Mono Varghese    MRN: [id number]           Patient Information     Date Of Birth          1970        Visit Information        Provider Department      10/19/2017 2:15 PM Devendra Almanzar MD Psychiatry Clinic        Today's Diagnoses     Major depressive disorder with single episode, in partial remission (H)    -  1       Follow-ups after your visit        Follow-up notes from your care team     Return in about 3 weeks (around 11/9/2017).      Your next 10 appointments already scheduled     Nov 03, 2017  9:30 AM CDT   Paracentesis Visit with  Spec Inf Para Provider, UC 39 ATC   [facility] ([facility] [place])    [address]  2nd [facility] [id number] [phone]            Nov 17, 2017  2:30 PM CST   New Visit with King Quiles DPM   [facility] ([facility])    [address] [place] [id number] [phone]            Jan 04, 2018   Procedure with Jordana Ramirez MD   [facility] [place] End[facility] ([facility])    [address] [address] [phone]           The [facility] is located on the corner of [facility] [address] [facility] and [address] on the [facility]. It is easily accessible from virtually any point in the [facility] area, via [address] and [address].            Apr 17, 2018  9:00 AM CDT   Lab with JEFF LAB   [facility] ([facility])    [address]  1st [facility] [id number] [phone]            Apr 17, 2018  9:40 AM CDT   (Arrive by 9:25 AM)   Return General Liver with Beatriz Tanner MD   [facility] ([facility])    [address] [place] [address]  3rd [facility] [id number] [phone]            Apr 27, 2018 11:30 AM  CDT   (Arrive by 11:15 AM)   MR BRAIN W/O & W CONTRAST with UCMR1   [facility] ([facility])    [address]  1st Floor  [facility] [address] [phone]           Take your medicines as usual, unless your doctor tells you not to. Bring a list of your current medicines to your exam (including vitamins, minerals and over-the-counter drugs).  You will be given intravenous contrast for this exam. To prepare:   The day before your exam, drink extra fluids at least six 8-ounce glasses (unless your doctor tells you to restrict your fluids).   Have a blood test (creatinine test) within 30 days of your exam. Go to your clinic or Diagnostic Imaging Department for this test.  The MRI machine uses a strong magnet. Please wear clothes without metal (snaps, zippers). A sweatsuit works well, or we may give you a hospital gown.  Please remove any body piercings and hair extensions before you arrive. You will also remove watches, jewelry, hairpins, wallets, dentures, partial dental plates and hearing aids. You may wear contact lenses, and you may be able to wear your rings. We have a safe place to keep your personal items, but it is safer to leave them at home.   **IMPORTANT** THE INSTRUCTIONS BELOW ARE ONLY FOR THOSE PATIENTS WHO HAVE BEEN TOLD THEY WILL RECEIVE SEDATION OR GENERAL ANESTHESIA DURING THEIR MRI PROCEDURE:  IF YOU WILL RECEIVE SEDATION (take medicine to help you relax during your exam):   You must get the medicine from your doctor before you arrive. Bring the medicine to the exam. Do not take it at home.   Arrive one hour early. Bring someone who can take you home after the test. Your medicine will make you sleepy. After the exam, you may not drive, take a bus or take a taxi by yourself.   No eating 8 hours before your exam. You may have clear liquids up until 4 hours before your exam. (Clear liquids include water, clear tea, black coffee and fruit juice  without pulp.)  IF YOU WILL RECEIVE ANESTHESIA (be asleep for your exam):   Arrive 1 1/2 hours early. Bring someone who can take you home after the test. You may not drive, take a bus or take a taxi by yourself.   No eating 8 hours before your exam. You may have clear liquids up until 4 hours before your exam. (Clear liquids include water, clear tea, black coffee and fruit juice without pulp.)  Please call the Imaging Department at your exam site with any questions.            May 04, 2018 11:00 AM CDT   (Arrive by 10:45 AM)   Return Visit with Lauro Shaw MD   [facility] ([facility] [place])    [address]  2nd [facility] [address] [phone]              Who to contact     Please call your clinic at [phone] to:    Ask questions about your health    Make or cancel appointments    Discuss your medicines    Learn about your test results    Speak to your doctor   If you have compliments or concerns about an experience at your clinic, or if you wish to file a complaint, please contact [facility] Patient Relations at [phone] or email us at [email or web address]         Additional Information About Your Visit        [employer] Information     [employer] gives you secure access to your electronic health record. If you see a primary care provider, you can also send messages to your care team and make appointments. If you have questions, please call your primary care clinic.  If you do not have a primary care provider, please call [phone] and they will assist you.      [employer] is an electronic gateway that provides easy, online access to your medical records. With [employer], you can request a clinic appointment, read your test results, renew a prescription or communicate with your care team.     To access your existing account, please contact your [facility] Clinic or call [phone] for  assistance.        Care EveryWhere ID     This is your Care EveryWhere ID. This could be used by other organizations to access your [facility] medical records  [id number]        Your Vitals Were     Pulse BMI (Body Mass Index)                79 30.91 kg/m2           Blood Pressure from Last 3 Encounters:   10/19/17 (!) 148/91   10/19/17 144/77   10/18/17 143/88    Weight from Last 3 Encounters:   10/19/17 97.7 kg (215 lb 6.4 oz)   10/18/17 97.5 kg (215 lb)   09/06/17 93.4 kg (206 lb)              Today, you had the following     No orders found for display         Today's Medication Changes          These changes are accurate as of: 10/19/17 11:59 PM.  If you have any questions, ask your nurse or doctor.               Start taking these medicines.        Dose/Directions    methylphenidate 10 MG tablet   Commonly known as:  RITALIN   Used for:  Major depressive disorder with single episode, in partial remission (H)   Started by:  Devendra Almanzar MD        Dose:  10 mg   Take 1 tablet (10 mg) by mouth 2 times daily   Quantity:  60 tablet   Refills:  0         These medicines have changed or have updated prescriptions.        Dose/Directions    mirtazapine 45 MG tablet   Commonly known as:  REMERON   This may have changed:    - medication strength  - how much to take  - how to take this  - when to take this  - additional instructions  - Another medication with the same name was removed. Continue taking this medication, and follow the directions you see here.   Used for:  Major depressive disorder with single episode, in partial remission (H)   Changed by:  Devendra Almanzar MD        Dose:  45 mg   Take 1 tablet (45 mg) by mouth At Bedtime   Quantity:  30 tablet   Refills:  1            Where to get your medicines      These medications were sent to [facility] [address] [place], [place] - [address] [facility]  [address] [facility] [address]     Phone:  [phone]     mirtazapine 45 MG tablet         Some of  these will need a paper prescription and others can be bought over the counter.  Ask your nurse if you have questions.     Bring a paper prescription for each of these medications     methylphenidate 10 MG tablet                Primary Care Provider Office Phone # Fax #    King Louis [phone]       [address] [address]        Equal Access to Services     [facility] : Hadii aad ku hadasho Soomaali, waaxda luqadaha, qaybta kaalmada adeegyada, waxay idiin hayaan adeeg khserash lajoyce ah. So [facility] [phone].    ATENCIÓN: Si habla espmagaly, tiene a chiang disposición servicios gratuitos de asistencia lingüística. Manuelito al [phone].    We comply with applicable federal civil rights laws and [place] laws. We do not discriminate on the basis of race, color, national origin, age, disability, sex, sexual orientation, or gender identity.            Thank you!     Thank you for choosing PSYCHIATRY CLINIC  for your care. Our goal is always to provide you with excellent care. Hearing back from our patients is one way we can continue to improve our services. Please take a few minutes to complete the written survey that you may receive in the mail after your visit with us. Thank you!             Your Updated Medication List - Protect others around you: Learn how to safely use, store and throw away your medicines at www.disposemymeds.org.          This list is accurate as of: 10/19/17 11:59 PM.  Always use your most recent med list.                   Brand Name Dispense Instructions for use Diagnosis    blood glucose monitoring test strip    ONE TOUCH ULTRA    300 each    Use to test blood sugars 4 times daily as needed or as directed.    Diabetes mellitus, type 2 (H)       Calcium Carb-Cholecalciferol 500-400 MG-UNIT Tabs    calcium 500 +D    90 tablet    Take 500 mg by mouth daily    Malnutrition (H)       cholecalciferol 1000 UNITS capsule    vitamin  -D    90 capsule    Take  1 capsule (1,000 Units) by mouth daily    Malnutrition (H)       cyanocobalamin 1000 MCG tablet    vitamin  B-12    130 tablet    1 tablet (1,000 mcg) by Per G Tube route daily    Alcoholic cirrhosis of liver with ascites (H)       dulaglutide 1.5 MG/0.5ML pen    TRULICITY    6 mL    Inject 1.5 mg Subcutaneous every 7 days    DM type 2, goal HbA1c 7%-8% (H)       ferrous sulfate 325 (65 FE) MG tablet    IRON    200 tablet    Take 1 tablet (325 mg) by mouth 2 times daily    Other iron deficiency anemia       gabapentin 100 MG capsule    NEURONTIN    30 capsule    Take 1 capsule (100 mg) by mouth At Bedtime    Mild episode of recurrent major depressive disorder (H)       HYDROcodone-acetaminophen 5-325 MG per tablet    NORCO    60 tablet    Take 2 tablets by mouth every 6 hours as needed for moderate to severe pain    Brain tumor (H)       hydrOXYzine 25 MG tablet    ATARAX    180 tablet    Take 1 tablet (25 mg) by mouth 2 times daily    Itching, Anxiety       lactulose 10 GM/15ML solution    CHRONULAC    473 mL    Take 7.5 mLs (5 g) by mouth 2 times daily    Hepatic encephalopathy (H)       levothyroxine 88 MCG tablet    SYNTHROID/LEVOTHROID    90 tablet    Take 1 tablet (88 mcg) by mouth daily    Hypothyroidism       methylphenidate 10 MG tablet    RITALIN    60 tablet    Take 1 tablet (10 mg) by mouth 2 times daily    Major depressive disorder with single episode, in partial remission (H)       mirtazapine 45 MG tablet    REMERON    30 tablet    Take 1 tablet (45 mg) by mouth At Bedtime    Major depressive disorder with single episode, in partial remission (H)       multivitamin, therapeutic with minerals Tabs tablet      Take 1 tablet by mouth 2 times daily        ONE TOUCH LANCETS Misc     100 each    by In Vitro route 4 times daily as needed    Type II or unspecified type diabetes mellitus without mention of complication, not stated as uncontrolled       phenytoin 30 MG CR capsule    DILANTIN    240 capsule     Take 4 capsules (120 mg) by mouth 2 times daily    Oligoastrocytoma of parietal lobe (H)       pravastatin 80 MG tablet    PRAVACHOL    90 tablet    TAKE ONE TABLET BY MOUTH ONCE DAILY    High cholesterol       rifaximin 550 MG Tabs tablet    XIFAXAN    60 tablet    Take 1 tablet (550 mg) by mouth 2 times daily    Hepatic encephalopathy (H)       simethicone 80 MG chewable tablet    MYLICON    180 tablet    Take 1 tablet (80 mg) by mouth every 6 hours as needed for cramping    Flatulence, eructation and gas pain       sulfamethoxazole-trimethoprim 800-160 MG per tablet    BACTRIM DS/SEPTRA DS    60 tablet    Take 1 tablet by mouth 2 times daily    Spontaneous bacterial peritonitis (H)       thiamine 100 MG tablet     100 tablet    Take 1 tablet (100 mg) by mouth daily    Alcoholic cirrhosis of liver with ascites (H)       traZODone 50 MG tablet    DESYREL    30 tablet    Take 1 tablet (50 mg) by mouth nightly as needed for sleep    Primary insomnia

## 2017-10-19 NOTE — NURSING NOTE
Chief Complaint   Patient presents with     Recheck Medication     MDD     Reviewed allergies, smoking status, and pharmacy preference    Obtained weight, blood pressure and heart rate

## 2017-10-20 ENCOUNTER — TELEPHONE (OUTPATIENT)
Dept: GASTROENTEROLOGY | Facility: CLINIC | Age: 47
End: 2017-10-20

## 2017-10-20 ASSESSMENT — PATIENT HEALTH QUESTIONNAIRE - PHQ9: SUM OF ALL RESPONSES TO PHQ QUESTIONS 1-9: 13

## 2017-10-20 NOTE — TELEPHONE ENCOUNTER
Trevon from [facility] called to report pt had an abnormal radiology finding on his US of the Abdomen that was done today 10/20. They flagged progression of already known portal vein thrombosis & worsening ascites.  They tried to page Dr Tanner but were unable to reach him.  Helene Morley RN

## 2017-10-23 ENCOUNTER — TELEPHONE (OUTPATIENT)
Dept: GASTROENTEROLOGY | Facility: CLINIC | Age: 47
End: 2017-10-23

## 2017-10-23 NOTE — TELEPHONE ENCOUNTER
Prior Authorization Specialty Medication Request    Medication/Dose: Xifaxan 550 mg   Diagnosis and ICD: hepatic encephalopathy k72.90  New/Renewal/Insurance Change PA:     Important Lab Values: AST 55, Alk phos 196     Previously Tried and Failed Therapies: Lactulose     Rationale: Xifaxan helps to lower the toxin build up in the blood while lactulose works by cleansing the toxin build up in the liver. Adjunctive therapy.      Would you like to include any research articles? yes   http://onlinelibrary.knight.com/doi/10.1111/denzel.52480/full    If you received a fax notification from an outside Pharmacy;  Pharmacy Name:  Pharmacy #:  Pharmacy Fax:

## 2017-10-23 NOTE — TELEPHONE ENCOUNTER
[facility] Prior Authorization Team   Phone: [phone]  Fax: [phone]      PA Initiation    Medication: Xifaxan 550 mg PA initiated  Insurance Company: [employer] Part D - Phone [phone] Fax [phone]  Pharmacy Filling the Rx: [facility] [place], [place] - [address] [address]  Filling Pharmacy Phone: [phone]  Filling Pharmacy Fax:    Start Date: 10/23/2017

## 2017-10-24 NOTE — TELEPHONE ENCOUNTER
[facility] Prior Authorization Team   Phone: [phone]  Fax: [phone]      Prior Authorization Approval    Authorization Effective Date: 7/25/2017  Authorization Expiration Date: 4/21/2018  Medication: Xifaxan 550 mg PA initiated - Approved  Reference #:     Insurance Company: [employer] Part D - Phone [phone] Fax [phone]  Expected CoPay: 0$     Which Pharmacy is filling the prescription (Not needed for infusion/clinic administered): [facility] [address] [place] [address] [facility]  Pharmacy Notified: yes  Patient Notified:  yes

## 2017-10-26 ENCOUNTER — TELEPHONE (OUTPATIENT)
Dept: GASTROENTEROLOGY | Facility: CLINIC | Age: 47
End: 2017-10-26

## 2017-10-26 DIAGNOSIS — K74.60 CIRRHOSIS OF LIVER WITH ASCITES, UNSPECIFIED HEPATIC CIRRHOSIS TYPE (H): Primary | Chronic | ICD-10-CM

## 2017-10-26 DIAGNOSIS — R18.8 CIRRHOSIS OF LIVER WITH ASCITES, UNSPECIFIED HEPATIC CIRRHOSIS TYPE (H): Primary | Chronic | ICD-10-CM

## 2017-10-26 NOTE — TELEPHONE ENCOUNTER
Tsering ([facility] nurse) requesting interpretation of 10/20/17 ab US, asking what plan of care is for pt. Please advise at [phone]. Sent to aneta spann.

## 2017-10-27 ENCOUNTER — TELEPHONE (OUTPATIENT)
Dept: PSYCHIATRY | Facility: CLINIC | Age: 47
End: 2017-10-27

## 2017-10-27 RX ORDER — ALBUMIN (HUMAN) 12.5 G/50ML
12.5 SOLUTION INTRAVENOUS 4 TIMES DAILY PRN
Status: CANCELLED
Start: 2017-10-27

## 2017-10-27 NOTE — TELEPHONE ENCOUNTER
Writer called Tsering but was unavailable but left message to call back.  Writer updated patient's wife. Plan will be to followup Monday once we get signatures for [place] orders.     [facility] out until the end of November 2017.  Patient is on a waiting list.

## 2017-10-27 NOTE — TELEPHONE ENCOUNTER
Rec'd incoming fax routed by centralized refill team, requesting writer's assistance with Remeron refill request.    Last seen: 10/19/17  RTC: 2-3 weeks  Cancel: none  No-show: none  Next appt: none     Incoming refill from [facility] via fax     Medication requested: mirtazapine 30 mg tabs  Directions: 15 mg QHS  Qty: 15  Last refilled: 10/6/17    Last office note unsigned although states:  Increase mirtazapine to 45mg QHS     Last prescribed:  mirtazapine (REMERON) 45 MG tablet 30 tablet 1 10/19/2017  --   Sig: Take 1 tablet (45 mg) by mouth At Bedtime   Class: E-Prescribe     Faxed denial to pharmacy with request to fill Remeron prescription submitted on 10/19/17.    Routed to provider as JESENIA

## 2017-10-27 NOTE — TELEPHONE ENCOUNTER
Writer returned patient's home care nurse's call regarding patient's recent abdominal ultrasound findings.  Discussed with Tsering Tanner's findings below.  Tsering stated that patient was having right upper quadrant pain that he rated on some days a 2 and on other days 8 to 9 out of 10.  Also stated that patient's abdomen was protruding more than usual and was inquiring about possibly setting patient up for paracentesis.  I let Tsering know that I would relay her questions and concerns to Dr. Tanner.       Message  Received: Today       Kwame Thomas LPN Burgess, Tasha N, LPN       Caller: Unspecified (Yesterday, 11:46 AM)                     I spoke with Dr. Tanner by phone when he was in his office. Dr. Tanner stated no change to current plan regime. Continue 6 months follow up with labs, imaging and appointments.     Pt's home care nurse calling regarding US abd findings. The thrombus is non-occlusive. If it were occlusive or if it moved into the bowels then anticoagulation therapy would be considered. With pt's history of brain cancer we don't want to purse anticoagulation unless necessary.     If the nurse has any further questions we can document those questions (place on hold or call back depending on how extensive those are) and follow up with responses.     Let's plan on calling the home care nurse today.   -Santy            Previous Messages       ----- Message -----      From: Beatriz Tanner MD      Sent: 10/27/2017  11:52 AM        To: Kwame Thomas LPN     Here   ----- Message -----      From: Kwame Thomas LPN      Sent: 10/27/2017   8:43 AM        To: Beatriz Tanner MD     This patient is the one that had a partial thrombus (nonocclusive) previously seen in left portal vein but is now also in the main portal vein. Home care nurse asking about plan of care.     Imaging every 6 months vs other? Please advise.   Thanks,   Santy

## 2017-10-30 NOTE — TELEPHONE ENCOUNTER
Writer spoke with patient's wife, Yisel, to let her know that a paracentesis was set up for patient for 10/30/17 at 2:30 pm with an arrival at 1:30 pm. Also informed patient's wife that patient should not eat or drink for 4 hours before procedure.  Patient's wife stated that she may be unable to bring her  because she has to work but will see if her mother will be able to.  Writer gave wife the  telephone number to [facility] where paracentesis is scheduled so that she can reschedule if needed.   Called [facility] to cancel appointment scheduled 11/29.  Writer left message for patient's wife informing her that there is an available appointment here in [facility] for Fri, 11/3/17 at 9 am and the number for her to call if she needed to cancel that.  Will followup Friday to see if patient did indeed take appointment with [facility]

## 2017-11-01 ENCOUNTER — TELEPHONE (OUTPATIENT)
Dept: PSYCHIATRY | Facility: CLINIC | Age: 47
End: 2017-11-01

## 2017-11-01 DIAGNOSIS — F32.4 MAJOR DEPRESSIVE DISORDER WITH SINGLE EPISODE, IN PARTIAL REMISSION (H): ICD-10-CM

## 2017-11-01 NOTE — TELEPHONE ENCOUNTER
Last appt: 10/19/17    Per last office note: Start methylphenidate 10mg qAM, increase to BID after one week as tolerated.     Per Med tab:  methylphenidate (RITALIN) 10 MG tablet 60 tablet 0 10/19/2017  --   Sig: Take 1 tablet (10 mg) by mouth 2 times daily   Class: Local Print   Notes to Pharmacy: Take 10mg daily for one week, then can increase to 10mg twice a day.     Routed to provider for update on where prescription was submitted

## 2017-11-01 NOTE — TELEPHONE ENCOUNTER
----- Message from Anisa Johns sent at 11/1/2017 10:02 AM CDT -----  Regarding: Kirans/Yohan  Contact: [phone]  Yisel (spouse) is caller. States Dr. Almanzar was going to start a new prescription (Ritalin) at the pt's last appointment, on 10/19.  She called the pharmacy today and they have not received anything from us yet.     States she also received a message from the insurance company that a PA was approved but she's unsure which med this was for.    Ok to LVM.    Pharmacy: [facility] in [place]

## 2017-11-02 DIAGNOSIS — E11.9 DM TYPE 2, GOAL HBA1C 7%-8% (H): ICD-10-CM

## 2017-11-02 NOTE — TELEPHONE ENCOUNTER
Devenrda Almanzar MD Bove, Michelle, RN        Caller: Unspecified (Yesterday,  1:57 PM)                     No patient was given the printed script but was unsure if he was going to start it or not pending conversation with wife about risks/benefits.       Returned call to pt's wife Yisel.  She reports that her mother brought pt to the last appt and was under the impression that the prescription was sent to the pharmacy.  She also states that pt as memory issues and cannot recall where the prescription went.  Yisel states that they do not have this prescription at home either.  Requests a replacement script to be mailed to [facility].  Writer stated would assist with having this mailed.    Routed to provider for authorization.

## 2017-11-03 RX ORDER — METHYLPHENIDATE HYDROCHLORIDE 10 MG/1
TABLET ORAL
Qty: 60 TABLET | Refills: 0 | Status: SHIPPED | OUTPATIENT
Start: 2017-11-03 | End: 2017-12-15

## 2017-11-03 NOTE — TELEPHONE ENCOUNTER
Dr. Almanzar willing to provider replacement script, however writer did not receive feedback on which provider to prescribe under.      As Dr. Garcia is on-site and pt has [place] for insurance, will print script under Dr. Garcia

## 2017-11-03 NOTE — TELEPHONE ENCOUNTER
Dr. Garcia signs script and requests that writer verify on [place]-, that previously provided Ritalin script has not been dispensed.    Reviewed 1 year history on [place]-.  There have been no stimulants dispensed during this timeframe.    Routed to Dr. Garcia as FYI.    Script mailed to [facility]

## 2017-11-05 RX ORDER — GLIPIZIDE 10 MG/1
10 TABLET, FILM COATED, EXTENDED RELEASE ORAL DAILY
Qty: 90 TABLET | Refills: 0 | OUTPATIENT
Start: 2017-11-05

## 2017-11-08 ENCOUNTER — DOCUMENTATION ONLY (OUTPATIENT)
Dept: CARE COORDINATION | Facility: CLINIC | Age: 47
End: 2017-11-08

## 2017-11-08 DIAGNOSIS — Z95.828 PORT CATHETER IN PLACE: Primary | ICD-10-CM

## 2017-11-08 RX ORDER — LIDOCAINE 40 MG/G
CREAM TOPICAL
Qty: 133 G | Refills: 1 | Status: SHIPPED | OUTPATIENT
Start: 2017-11-08 | End: 2017-12-26

## 2017-11-08 NOTE — PROGRESS NOTES
[facility] utilizes an encounter to take the place of a direct phone call to your office. Please take a moment to review the below request. Your reply to this encounter will act as a verbal OK of orders if requested below. Thank you.    ORDER    Home Care is completing monthly PORT flush.  Client is requesting use of lidocaine to PORT site prior to needle stick.  Could this be sent to his pharmacy at [facility]?     Angela Manley RN    [phone]  [email or web address]

## 2017-11-08 NOTE — PROGRESS NOTES
Agreed.  Lidocaine 4 % cream was sent to [facility] pharmacy.  Thank you.    Jeannette Camargo RN

## 2017-11-17 DIAGNOSIS — C71.3 OLIGOASTROCYTOMA OF PARIETAL LOBE (H): Chronic | ICD-10-CM

## 2017-11-20 ENCOUNTER — TELEPHONE (OUTPATIENT)
Dept: NEUROLOGY | Facility: CLINIC | Age: 47
End: 2017-11-20

## 2017-11-23 ENCOUNTER — DOCUMENTATION ONLY (OUTPATIENT)
Dept: CARE COORDINATION | Facility: CLINIC | Age: 47
End: 2017-11-23

## 2017-11-23 NOTE — PROGRESS NOTES
[facility] utilizes an encounter to take the place of a direct phone call to your office. Please take a moment to review the below request. Your reply to this encounter will act as a verbal OK of orders if requested below. Thank you.    ORDER    SN 1 x week for 9 weeks with 2 prn    RECERT on 11.23.17  Situation    Patient alert and oriented and cognition appears to be improving over this past CERT period.  Client continues to have memory impairment, but overall this has improved.  Client resides in a town house with spouse who is his PCA. Clients mother in law is also his PCA.  VSS, lungs CTA.  Patient continues to have pain to right abdomen that radiates to his back. This pain has gradually been improving.  Over this past CERT period he has had an US and minimal ascites were seen in the abdominal cavity, however he does have ascites surrouinding the liver.  Per spouse, they did not elect to do a paracentesis due to the large portal vein thrombosis. Plan is to watch patient for now and he will return to clinic in 5 months. He continues to deny having flatulence despite taking simethicone BID as well as metamucil daily.   Weight today is 210lbs which is the same weight as 2 months ago. Client has been mostly compliant with daily dosing of lactulose.  He has held this one day this past week due to continous loose stools.  He is averaging 4 bowels before noon and a total of 8 to 10 per day.  Client reports getting  sick and tired of the lacutlose and frequent stooling. Spouse and Mother in Law continue to pass meds to ensure these are being taken correctly and on time.  SKin CDI, feet assessed with no concerns.  Blood sugars have have been elevated over this past CERT period, but over the past week they have greatly improved. This past weeks readings ranged from 140 to 220s.  Appetite continues to be a struggle as client does not have a desire to eat, but caregivers have been working with him on ensuring two  meals per day and this has made the diffence in improving BG.  Client has verbalized understanding to the correlation with well rounded diet and good glucose control.  Client aware that if he cannot maintain nutrition, he will have another feeding tube placed.  Client continues  to have the occasional headaches.  These can be debilitating, however if he takes APAP promptly, it typically resolves. Edu on reporting headache at onset so that he can medicate.  Ambulating without assistance however unsteady on his feet and use of  walls and furniture. Client has had one fall this past CERT period and has remained out of the hosptital.  Over this past CERT period, home care has taken over with monthly PORT flushed. This is going well.  Background Hx of type 2 diabetes without mention of complication, heptatic encephalopathy, Alcoholic cirrhosis of liver without ascites, partial epilepsy with impairment of  consciousness, major depressive disorder reccurent episode/moderate, sleep disturbances, generalized pain, BLE edema, venous thromboembolism with SMV and portal vein occlusion on Lovenox injections  Analysis Pt is at   risk for rehospitalization d/t high  risk medications, risk for falls, chronic comorbidities  Recommedation SN for medication management/education, chronic disease management/education, weight checks, compliance with BG monitoring, abdominal girth   measurements, fall assessment, home  safety  Expected length of home care is ongoing for assess/educatin of chronic illnesses and medication mngmt with set up.    Angela Manley RN    [phone]  [email or web address]

## 2017-11-27 ENCOUNTER — MEDICAL CORRESPONDENCE (OUTPATIENT)
Dept: HEALTH INFORMATION MANAGEMENT | Facility: CLINIC | Age: 47
End: 2017-11-27

## 2017-11-27 ENCOUNTER — TELEPHONE (OUTPATIENT)
Dept: INTERNAL MEDICINE | Facility: CLINIC | Age: 47
End: 2017-11-27

## 2017-11-27 NOTE — TELEPHONE ENCOUNTER
Call from Tsering RN with [facility], requesting the following orders:     - Skilled nursing visits     - 1 time per week for 9 weeks     - 2 PRN visits    Approval given for above orders.

## 2017-12-13 ENCOUNTER — DOCUMENTATION ONLY (OUTPATIENT)
Dept: CARE COORDINATION | Facility: CLINIC | Age: 47
End: 2017-12-13

## 2017-12-13 NOTE — PROGRESS NOTES
[facility] utilizes an encounter to take the place of a direct phone call to your office. Please take a moment to review the below request. Your reply to this encounter will act as a verbal OK of orders if requested below. Thank you.    Patient Update:    Client has had a 5lb weight gain over the past week. He weighed 215.4lbs today.  Clients spouse is also reporting increased unsteadiness/lightheadedness.  No falls have occurred. BP today is WNL.  BG has been under 200 with the exception of today which was 275.      Also dosing on lactulose states 7.5mls BID.  Spouse reports that she has been doing 10ml daily.  Is this still appropriate?  Client reporting 4 to 6 stools per day.      Please Advise    Angela Manley RN Case Manager  [phone]  [email or web address]

## 2017-12-15 ENCOUNTER — MYC REFILL (OUTPATIENT)
Dept: FAMILY MEDICINE | Facility: CLINIC | Age: 47
End: 2017-12-15

## 2017-12-15 DIAGNOSIS — D49.6 BRAIN TUMOR (H): ICD-10-CM

## 2017-12-19 RX ORDER — HYDROCODONE BITARTRATE AND ACETAMINOPHEN 5; 325 MG/1; MG/1
2 TABLET ORAL EVERY 6 HOURS PRN
Qty: 60 TABLET | Refills: 0 | Status: SHIPPED | OUTPATIENT
Start: 2017-12-19 | End: 2018-03-26

## 2017-12-19 NOTE — TELEPHONE ENCOUNTER
Message from [facility]:  Melida Mehta, RN Tue Dec 19, 2017 7:36 AM        ----- Message -----   From: Mono Varghese   Sent: 12/15/2017 7:04 PM   To: [facility] Nursing Staff-  Subject: Medication Renewal Request     Original authorizing provider: MD Mono Cervantes would like a refill of the following medications:  HYDROcodone-acetaminophen (NORCO) 5-325 MG per tablet [Kign Louis MD]    Preferred pharmacy: [address] [address] [facility]    Comment:      Medication renewals requested in this message routed to other providers:  methylphenidate (RITALIN) 10 MG tablet [Noah Garcia MD]

## 2017-12-20 ENCOUNTER — RADIANT APPOINTMENT (OUTPATIENT)
Dept: GENERAL RADIOLOGY | Facility: CLINIC | Age: 47
End: 2017-12-20
Attending: FAMILY MEDICINE
Payer: MEDICARE

## 2017-12-20 ENCOUNTER — OFFICE VISIT (OUTPATIENT)
Dept: FAMILY MEDICINE | Facility: CLINIC | Age: 47
End: 2017-12-20
Payer: MEDICARE

## 2017-12-20 VITALS
OXYGEN SATURATION: 97 % | SYSTOLIC BLOOD PRESSURE: 128 MMHG | BODY MASS INDEX: 32.04 KG/M2 | DIASTOLIC BLOOD PRESSURE: 82 MMHG | WEIGHT: 223.3 LBS | HEART RATE: 78 BPM

## 2017-12-20 DIAGNOSIS — E11.9 DM TYPE 2, GOAL HBA1C 7%-8% (H): ICD-10-CM

## 2017-12-20 DIAGNOSIS — R05.9 COUGH: ICD-10-CM

## 2017-12-20 DIAGNOSIS — D64.9 ANEMIA, UNSPECIFIED TYPE: ICD-10-CM

## 2017-12-20 DIAGNOSIS — K64.4 EXTERNAL HEMORRHOIDS: ICD-10-CM

## 2017-12-20 DIAGNOSIS — R26.89 BALANCE DISORDER: Primary | ICD-10-CM

## 2017-12-20 LAB
ALBUMIN SERPL-MCNC: 3.2 G/DL (ref 3.4–5)
ALP SERPL-CCNC: 261 U/L (ref 40–150)
ALT SERPL W P-5'-P-CCNC: 38 U/L (ref 0–70)
ANION GAP SERPL CALCULATED.3IONS-SCNC: 8 MMOL/L (ref 3–14)
AST SERPL W P-5'-P-CCNC: 51 U/L (ref 0–45)
BASOPHILS # BLD AUTO: 0 10E9/L (ref 0–0.2)
BASOPHILS NFR BLD AUTO: 0.5 %
BILIRUB SERPL-MCNC: 1.4 MG/DL (ref 0.2–1.3)
BUN SERPL-MCNC: 7 MG/DL (ref 7–30)
CALCIUM SERPL-MCNC: 8.2 MG/DL (ref 8.5–10.1)
CHLORIDE SERPL-SCNC: 106 MMOL/L (ref 94–109)
CHOLEST SERPL-MCNC: 149 MG/DL
CO2 SERPL-SCNC: 24 MMOL/L (ref 20–32)
CREAT SERPL-MCNC: 0.89 MG/DL (ref 0.66–1.25)
DIFFERENTIAL METHOD BLD: ABNORMAL
EOSINOPHIL # BLD AUTO: 0.2 10E9/L (ref 0–0.7)
EOSINOPHIL NFR BLD AUTO: 8.5 %
ERYTHROCYTE [DISTWIDTH] IN BLOOD BY AUTOMATED COUNT: 14.6 % (ref 10–15)
GFR SERPL CREATININE-BSD FRML MDRD: >90 ML/MIN/1.7M2
GLUCOSE SERPL-MCNC: 176 MG/DL (ref 70–99)
HBA1C MFR BLD: 7.8 % (ref 4.3–6)
HCT VFR BLD AUTO: 38.3 % (ref 40–53)
HDLC SERPL-MCNC: 64 MG/DL
HGB BLD-MCNC: 12.8 G/DL (ref 13.3–17.7)
IMM GRANULOCYTES # BLD: 0 10E9/L (ref 0–0.4)
IMM GRANULOCYTES NFR BLD: 0.5 %
LDLC SERPL CALC-MCNC: 71 MG/DL
LYMPHOCYTES # BLD AUTO: 0.4 10E9/L (ref 0.8–5.3)
LYMPHOCYTES NFR BLD AUTO: 19.1 %
MCH RBC QN AUTO: 32.8 PG (ref 26.5–33)
MCHC RBC AUTO-ENTMCNC: 33.4 G/DL (ref 31.5–36.5)
MCV RBC AUTO: 98 FL (ref 78–100)
MONOCYTES # BLD AUTO: 0.2 10E9/L (ref 0–1.3)
MONOCYTES NFR BLD AUTO: 8 %
NEUTROPHILS # BLD AUTO: 1.2 10E9/L (ref 1.6–8.3)
NEUTROPHILS NFR BLD AUTO: 63.4 %
NONHDLC SERPL-MCNC: 85 MG/DL
NRBC # BLD AUTO: 0 10*3/UL
NRBC BLD AUTO-RTO: 0 /100
PLATELET # BLD AUTO: 40 10E9/L (ref 150–450)
POTASSIUM SERPL-SCNC: 4 MMOL/L (ref 3.4–5.3)
PROT SERPL-MCNC: 7 G/DL (ref 6.8–8.8)
RBC # BLD AUTO: 3.9 10E12/L (ref 4.4–5.9)
SODIUM SERPL-SCNC: 137 MMOL/L (ref 133–144)
T4 FREE SERPL-MCNC: 1.14 NG/DL (ref 0.76–1.46)
TRIGL SERPL-MCNC: 72 MG/DL
TSH SERPL DL<=0.005 MIU/L-ACNC: 7.79 MU/L (ref 0.4–4)
WBC # BLD AUTO: 1.9 10E9/L (ref 4–11)

## 2017-12-20 RX ORDER — AZITHROMYCIN 250 MG/1
TABLET, FILM COATED ORAL
Qty: 6 TABLET | Refills: 0 | Status: SHIPPED | OUTPATIENT
Start: 2017-12-20 | End: 2017-12-29

## 2017-12-20 NOTE — PROGRESS NOTES
[facility]  Primary Care Center   King Louis MD  12/20/2017     Chief Complaint:   Follow-Up.      History of Present Illness:   Mono Varghese is a 47 year old male who presents with his mother-in-law (who is a NP) for follow-up. He has a past medical history significant for diabetes mellitus, major depression, LENA, oligoastrocytoma of parietal lobe, ADHD, liver cirrhosis, chronic pain, and hyperlipidemia among others. He presents today to discuss multiple issues.     Liver failure: The patient states he has had a recent increase in abdominal distension. He has not noticed any drainage. He has not noticed any increased swelling in his lower extremities. There is an order placed for a paracentesis from Oct by liver clinic; per chart review, the patient's last abdominal paracentesis was completed on 11/23/2015.       Bloody Stools: He has had several bloody stools lately. He does have a history of hemorrhoids. He does not enjoy taking his lactulose but his mother-in-law indicates he has been compliant with this.      Diabetes mellitus: The patient states he checks his blood pressure at home on a regular basis. He has noticed glucose levels between 89 and 300 recently.     Cough: The patient worked as a  for many years and has always had an underlying cough per he and his mother-in-law's report, however, this has recently worsened and become more frequent in the last few weeks to months. He has not measured any fevers at home. He denies a sensation of reflux prior to coughing episodes. He denies any other URI complaints, including rhinorrhea or a sore throat. He has no prior smoking history. He denies a prior diagnosis of COPD or emphysema.     Balance concerns: The patient's mother-in-law states his balance is worsening and he has been more wobbly. He has used a cane in the past but is no longer using this. He has two walkers at home but does not like to use these.     Other concerns discussed:  1. Flu  shot: He states he has had one this year but is unsure where this was given. It is not available in our records.   2. He has an upcoming endoscopy scheduled for 1/4/2018.   3. Memory loss: Per the patient's PCA, the patient's memory is increasingly declining.     Review of Systems:   CONSTITUTIONAL: no fatigue   SKIN: no worrisome rashes, no worrisome moles, no worrisome lesions  EYES: no acute vision problems or changes  ENT: no ear problems, no mouth problems, no throat problems  RESP: no shortness of breath, positive for cough  CV: no chest pain, no palpitations, no new or worsening peripheral edema  GI: no nausea, no vomiting, no constipation, positive for increased abdominal distention, positive for bloody stools    Active Medications:      methylphenidate (RITALIN) 10 MG tablet, Take 10mg daily for one week, then increase to 10mg twice a day, Disp: 60 tablet, Rfl: 0     HYDROcodone-acetaminophen (NORCO) 5-325 MG per tablet, Take 2 tablets by mouth every 6 hours as needed for moderate to severe pain, Disp: 60 tablet, Rfl: 0     phenytoin (DILANTIN) 30 MG CR capsule, Take 4 capsules (120 mg) by mouth 2 times daily, Disp: 720 capsule, Rfl: 1     lidocaine (LMX4) 4 % CREA cream, Apply topically once as needed for mild pain, Disp: 133 g, Rfl: 1     glipiZIDE (GLUCOTROL XL) 10 MG 24 hr tablet, Take 1 tablet (10 mg) by mouth daily, Disp: 90 tablet, Rfl: 0     omeprazole (PRILOSEC) 20 MG CR capsule, Take 2 capsules (40 mg) by mouth 2 times daily, Disp: 360 capsule, Rfl: 3     mirtazapine (REMERON) 45 MG tablet, Take 1 tablet (45 mg) by mouth At Bedtime, Disp: 30 tablet, Rfl: 1     dulaglutide (TRULICITY) 1.5 MG/0.5ML pen, Inject 1.5 mg Subcutaneous every 7 days, Disp: 6 mL, Rfl: 1     hydrOXYzine (ATARAX) 25 MG tablet, Take 1 tablet (25 mg) by mouth 2 times daily, Disp: 180 tablet, Rfl: 1     cyanocobalamin (VITAMIN  B-12) 1000 MCG tablet, 1 tablet (1,000 mcg) by Per G Tube route daily, Disp: 130 tablet, Rfl: 2      sulfamethoxazole-trimethoprim (BACTRIM DS/SEPTRA DS) 800-160 MG per tablet, Take 1 tablet by mouth 2 times daily, Disp: 60 tablet, Rfl: 5     gabapentin (NEURONTIN) 100 MG capsule, Take 1 capsule (100 mg) by mouth At Bedtime, Disp: 30 capsule, Rfl: 3     traZODone (DESYREL) 50 MG tablet, Take 1 tablet (50 mg) by mouth nightly as needed for sleep, Disp: 30 tablet, Rfl: 3     pravastatin (PRAVACHOL) 80 MG tablet, TAKE ONE TABLET BY MOUTH ONCE DAILY, Disp: 90 tablet, Rfl: 1     lactulose (CHRONULAC) 10 GM/15ML solution, Take 7.5 mLs (5 g) by mouth 2 times daily, Disp: 473 mL, Rfl: 1     simethicone (MYLICON) 80 MG chewable tablet, Take 1 tablet (80 mg) by mouth every 6 hours as needed for cramping, Disp: 180 tablet, Rfl: 1     cholecalciferol (VITAMIN  -D) 1000 UNITS capsule, Take 1 capsule (1,000 Units) by mouth daily, Disp: 90 capsule, Rfl: 1     thiamine 100 MG tablet, Take 1 tablet (100 mg) by mouth daily, Disp: 100 tablet, Rfl: 1     rifaximin (XIFAXAN) 550 MG TABS tablet, Take 1 tablet (550 mg) by mouth 2 times daily, Disp: 60 tablet, Rfl: 11     blood glucose monitoring (ONE TOUCH ULTRA) test strip, Use to test blood sugars 4 times daily as needed or as directed., Disp: 300 each, Rfl: 4     levothyroxine (SYNTHROID/LEVOTHROID) 88 MCG tablet, Take 1 tablet (88 mcg) by mouth daily, Disp: 90 tablet, Rfl: 3     ferrous sulfate (IRON) 325 (65 FE) MG tablet, Take 1 tablet (325 mg) by mouth 2 times daily, Disp: 200 tablet, Rfl: 3     Calcium Carb-Cholecalciferol (CALCIUM 500 +D) 500-400 MG-UNIT TABS, Take 500 mg by mouth daily, Disp: 90 tablet, Rfl: 1     multivitamin, therapeutic with minerals (THERA-VIT-M) TABS, Take 1 tablet by mouth 2 times daily, Disp: , Rfl:      ONE TOUCH LANCETS MISC, by In Vitro route 4 times daily as needed, Disp: 100 each, Rfl: prn      Allergies:   No clinical screening - see comments; Tegaderm transparent dressing (informational only); and Latex     Active Problem List:    Type II diabetes  mellitus.   Moderate major depression.   LENA.   Oligoastrocytoma of parietal lobe.   ADHD.   Financial difficulties.   Thrombocytopenia.   Partial epilepsy with impairment of consciousness.   Cirrhosis of liver.  Pulmonary nodules.   Myopic astigmatism of both eyes.   Presbyopia.   Tinnitus.   Gastrointestinal bleeding.   Portal vein thrombosis.   Chronic pain.   Hyperlipidemia.   Pancytopenia.   Hypothyroidism.   Malnutrition.   Hepatic encephalopathy.      Past Medical History:  The patient has a past medical history of brain tumor and liver failure.      Past Surgical History:  The patient has a past surgical history that includes orthopedic surgery; optical tracking system craniotomy, excise tumor, combined (6/6/2013); esophagoscopy, gastroscopy, duodenoscopy x multiple; sigmoidoscopy flexible; colonoscopy (11/27/2015).     Social History:   The patient lives in a town home with his wife who is his personal care attendant; patient's mother-in-law also works as his personal care attendant. He presents with his mother-in-law. The patient's wife is now working full-time for [employer].      Physical Exam:   /82  Pulse 78  Wt 101.3 kg (223 lb 4.8 oz)  SpO2 97%  BMI 32.04 kg/m2   Constitutional: Alert. In no distress.  Head: Normocephalic. No masses, lesions, tenderness or abnormalities.  ENT: No neck nodes or sinus tenderness.  Cardiovascular: RRR. No murmurs, clicks, gallops, or rub.  Respiratory: Clear to auscultation bilaterally, no wheezes or crackles.  Gastrointestinal: Distended. Abdomen soft. Non-tender. BS normal. No masses or organomegaly.   Musculoskeletal: No gross deformities noted. Normal muscle tone. Trace edema bilaterally.   Skin: No suspicious lesions. No rashes.  Neurologic: Gait normal. Reflexes normal and symmetric. Sensation grossly WNL.   Psychiatric: Awake and alert.   Hematologic/Lymphatic/Immunologic: Normal cervical lymph nodes.       Assessment and Plan:  External hemorrhoids: He was  provided with a referral to a colorectal surgeon.     Liver failure: I suspect his abdominal distension is secondary to ascites. We will set Mono up for an abdominal ultrasound (which has already been ordered) and subsequent paracentesis if indicated following US.     Balance disorder: I have given him a prescription for a cane to help with stabilization while walking.      Cough: He was provided a prescription for azithromycin to treat empirically and was sent for x-ray imaging of the chest. We will call him with the results. Cough could also be due to esophageal irritation secondary to the increased abdominal pressure and ascites.     Diabetes Mellitus: Per mother-in-law, sugars have improved with current medication regimen. Will order labs today including hemoglobin A1c, TSH with free T4 reflex, CMP, urine albumin, and lipid panel.     Anemia: CBC ordered today.     Flu Shot: Family will check on this.     Follow-up: Return in about 1 month (around 1/20/2018).         Scribe Disclosure:   I, Gee Thompson, am serving as a scribe to document services personally performed by King Louis MD at this visit, based upon the provider's statements to me. All documentation has been reviewed by the aforementioned provider prior to being entered into the official medical record.     Portions of this medical record were completed by a scribe. UPON MY REVIEW AND AUTHENTICATION BY ELECTRONIC SIGNATURE, this confirms (a) I performed the applicable clinical services, and (b) the record is accurate.     King Louis MD

## 2017-12-20 NOTE — PROGRESS NOTES
Rooming Note  Health Maintenance   Health Maintenance Due   Topic Date Due     URINE DRUG SCREEN Q1 YR  [birth year]/29[birth year]1983     DEPRESSION ACTION PLAN Q1 YR  06/17/2016     EYE EXAM Q1 YEAR  04/20/2017     FOOT EXAM Q1 YEAR  07/20/2017     LIPID MONITORING Q1 YEAR  07/20/2017     INFLUENZA VACCINE (SYSTEM ASSIGNED)  09/01/2017     RICK QUESTIONNAIRE 1 YEAR  09/16/2017     MICROALBUMIN Q1 YEAR  12/09/2017    All health maintenance items discussed and pended.  Patient saw eye doctor summer 2017 and had flu shot will send records.

## 2017-12-20 NOTE — PATIENT INSTRUCTIONS
"[facility]: [phone]     [facility] Medication Refill Request Information:  * Please contact your pharmacy regarding ANY request for medication refills.  ** [facility] Prescription Fax = [phone]  * Please allow 3 business days for routine medication refills.  * Please allow 5 business days for controlled substance medication refills.     [facility] Test Result notification information:  *You will be notified with in 7-10 days of your appointment day regarding the results of your test.  If you are on MyChart you will be notified as soon as the provider has reviewed the results and signed off on them.    Please use Oct 27 Dr Tanner order for \"US Paracentesis\" to help set it up.   "

## 2017-12-20 NOTE — MR AVS SNAPSHOT
"              After Visit Summary   12/20/2017    Mono Varghese    MRN: [id number]           Patient Information     Date Of Birth          1970        Visit Information        Provider Department      12/20/2017 12:35 PM King Louis MD [facility]        Today's Diagnoses     Balance disorder    -  1    Cough        External hemorrhoids        DM type 2, goal HbA1c 7%-8% (H)        Anemia, unspecified type          Care Instructions    Primary Care Center: [phone]     Primary Care Center Medication Refill Request Information:  * Please contact your pharmacy regarding ANY request for medication refills.  ** [facility] Prescription Fax = [phone]  * Please allow 3 business days for routine medication refills.  * Please allow 5 business days for controlled substance medication refills.     Primary Care Center Test Result notification information:  *You will be notified with in 7-10 days of your appointment day regarding the results of your test.  If you are on MyChart you will be notified as soon as the provider has reviewed the results and signed off on them.    Please use Oct 27 Dr Tanner order for \"US Paracentesis\" to help set it up.           Follow-ups after your visit        Additional Services     COLORECTAL SURGERY REFERRAL       Your provider has referred you to: [facility]: Colon and Rectal Surgery Clinic [facility] [phone]   [email or web address]    Referral Reason(s): Hemorrhoids  Special Concerns: None  This referral is: Elective (week +)  It is OK to leave a message on patient's voicemail.    Please be aware that coverage of these services is subject to the terms and limitations of your health insurance plan.  Call member services at your health plan with any benefit or coverage questions.      Please bring the following with you to your appointment:    (1) Any X-Rays, CTs or MRIs which have been performed.  Contact the " facility where they were done to arrange for  prior to your scheduled appointment.    (2) List of current medications  (3) This referral request   (4) Any documents/labs given to you for this referral                  Follow-up notes from your care team     Return in about 1 month (around 1/20/2018).      Your next 10 appointments already scheduled     Dec 20, 2017  2:30 PM CST   LAB with  LAB   [facility] ([facility])    [address]  1st Floor  [facility] [address] [phone]           Please do not eat 10-12 hours before your appointment if you are coming in fasting for labs on lipids, cholesterol, or glucose (sugar). This does not apply to pregnant women. Water, hot tea and black coffee (with nothing added) are okay. Do not drink other fluids, diet soda or chew gum.            Dec 27, 2017  8:00 AM CST   (Arrive by 7:45 AM)   US PARACENTESIS with MGUS1, MG US TECH, MG IMAGING NURSE, MG NM RAD   [facility] ([facility])    [address] [place] [address] [phone]           Tell us in advance if there s any chance you may be pregnant.  Bring a list of your medicines to the exam. Include vitamins, minerals and over-the-counter drugs.  If you take blood thinners, you may need to stop taking them a few days before treatment. Talk to your doctor before stopping these medicines. You will need a blood test the morning of your exam.   Stop taking Coumadin (warfarin) 3 days before your exam. Restart the day after your exam.   If you take aspirin, you may need to stop taking it 3 days before your scan.   If you take Plavix, Ticlid, Pletal or Persantine, you may need to stop taking them 5 days before your scan. Please talk to your doctor before stopping these medicines.  IF YOU WILL RECEIVE SEDATION (medicine to help you relax):   See your family doctor for an exam within 30 days of treatment.   Plan for an adult to  drive you home and stay with you for at least 6 hours.   Follow the eating and drinking guidelines checked below:   No eating or drinking for 4 hours before your test. You may take medicine with small sips of water.   If you have diabetes:If you take insulin, call your diabetes care team. Do not take diabetes pills on the morning of your test. If you take metformin (Avandamet, Glucophage, Glucovance, Metaglip) and received contrast, wait 48 hours before re-starting this medicine.  Please call the Imaging Department at your exam site with any questions.            Jan 03, 2018  1:45 PM CST   (Arrive by 1:30 PM)   New Patient Visit with TIESHA Scanlon [facility] Colon and Rectal Surgery ([facility])    [address] [place] [address]  [address] [facility] [id number] [phone]            Jan 04, 2018   Procedure with Jordana Ramirez MD   [facility] [place] Endoscopy ([facility])    [address] [address] [phone]           The [facility] is located on the corner of [address] and [address] on the [facility]. It is easily accessible from virtually any [place], via [address].            Jan 23, 2018  8:45 AM CST   Adult Med Follow UP with Devendra Almanzar MD   [facility] ([facility])    [place] [id number]  [address] [facility] [address] [phone]            Jan 23, 2018 10:30 AM CST   (Arrive by 10:15 AM)   Return Visit with King Louis MD   [facility] ([facility])    [address] [place] [address]  [address] [facility] [id number] [phone]            Apr 17, 2018  9:00 AM CDT   Lab with  LAB   [facility] ([facility])    [address] [place] [address]  [address]st [facility] [id number] [phone]               Future tests that were ordered for you today     Open Future Orders        Priority Expected Expires Ordered    TSH with free T4 reflex Routine 12/20/2017 1/3/2018 12/20/2017    XR Chest 2 Views Routine 12/20/2017 12/20/2018 12/20/2017    Lipid panel reflex to direct LDL Fasting Routine 12/20/2017 12/20/2018 12/20/2017    Albumin Random Urine Quantitative with Creat Ratio Routine 12/20/2017 12/20/2018 12/20/2017    Hemoglobin A1c Routine 12/20/2017 1/3/2018 12/20/2017    Comprehensive metabolic panel Routine 12/20/2017 1/3/2018 12/20/2017    CBC with platelets differential Routine 12/20/2017 1/3/2018 12/20/2017            Who to contact     Please call your clinic at [phone] to:    Ask questions about your health    Make or cancel appointments    Discuss your medicines    Learn about your test results    Speak to your doctor   If you have compliments or concerns about an experience at your clinic, or if you wish to file a complaint, please contact [facility] Patient Relations at [phone] or email us at [email or web address]         Additional Information About Your Visit        [email or web address] Information     Mapori gives you secure access to your electronic health record. If you see a primary care provider, you can also send messages to your care team and make appointments. If you have questions, please call your primary care clinic.  If you do not have a primary care provider, please call [phone] and they will assist you.      Mapori is an electronic gateway that provides easy, online access to your medical records. With Map[employer], you can request a clinic appointment, read your test results, renew a prescription or communicate with your care team.     To access your existing account, please contact your [facility] or call [phone] for assistance.        Care EveryWhere ID     This is your Care EveryWhere ID. This could be used  by other organizations to access your [facility] medical records  [id number]        Your Vitals Were     Pulse Pulse Oximetry BMI (Body Mass Index)             78 97% 32.04 kg/m2          Blood Pressure from Last 3 Encounters:   12/20/17 128/82   10/19/17 144/77   10/18/17 143/88    Weight from Last 3 Encounters:   12/20/17 101.3 kg (223 lb 4.8 oz)   10/18/17 97.5 kg (215 lb)   09/06/17 93.4 kg (206 lb)              We Performed the Following     COLORECTAL SURGERY REFERRAL          Today's Medication Changes          These changes are accurate as of: 12/20/17  1:49 PM.  If you have any questions, ask your nurse or doctor.               Start taking these medicines.        Dose/Directions    azithromycin 250 MG tablet   Commonly known as:  ZITHROMAX   Used for:  Anemia, unspecified type   Started by:  King Louis MD        Two tablets first day, then one tablet daily for four days.   Quantity:  6 tablet   Refills:  0       CANE, ANY MATERIAL   Used for:  Balance disorder   Started by:  King Louis MD        One cane   Quantity:  1 each   Refills:  0            Where to get your medicines      These medications were sent to [facility] [address] [place] [address] [address] [address]     Phone:  [phone]     azithromycin 250 MG tablet         Some of these will need a paper prescription and others can be bought over the counter.  Ask your nurse if you have questions.     Bring a paper prescription for each of these medications     CANE, ANY MATERIAL                Primary Care Provider Office Phone # Fax #    King Louis [phone]       [address] [address]        Equal Access to Services     [facility] AH: Jennifer Thomas, olesya lucarmina, qaybta kaalmada josue cason. So [facility] [phone].    ATENCIÓN: Si habla español, tiene a chiang disposición servicios  nicola de asistencia lingüística. Manuelito betancourt [phone].    We comply with applicable federal civil rights laws and [place] laws. We do not discriminate on the basis of race, color, national origin, age, disability, sex, sexual orientation, or gender identity.            Thank you!     Thank you for choosing [facility]  for your care. Our goal is always to provide you with excellent care. Hearing back from our patients is one way we can continue to improve our services. Please take a few minutes to complete the written survey that you may receive in the mail after your visit with us. Thank you!             Your Updated Medication List - Protect others around you: Learn how to safely use, store and throw away your medicines at www.disposemymeds.org.          This list is accurate as of: 12/20/17  1:49 PM.  Always use your most recent med list.                   Brand Name Dispense Instructions for use Diagnosis    azithromycin 250 MG tablet    ZITHROMAX    6 tablet    Two tablets first day, then one tablet daily for four days.    Anemia, unspecified type       blood glucose monitoring test strip    ONETOUCH ULTRA    300 each    Use to test blood sugars 4 times daily as needed or as directed.    Diabetes mellitus, type 2 (H)       Calcium Carb-Cholecalciferol 500-400 MG-UNIT Tabs    calcium 500 +D    90 tablet    Take 500 mg by mouth daily    Malnutrition (H)       CANE, ANY MATERIAL     1 each    One cane    Balance disorder       cholecalciferol 1000 UNITS capsule    vitamin  -D    90 capsule    Take 1 capsule (1,000 Units) by mouth daily    Malnutrition (H)       cyanocobalamin 1000 MCG tablet    vitamin  B-12    130 tablet    1 tablet (1,000 mcg) by Per G Tube route daily    Alcoholic cirrhosis of liver with ascites (H)       dulaglutide 1.5 MG/0.5ML pen    TRULICITY    6 mL    Inject 1.5 mg Subcutaneous every 7 days    DM type 2, goal HbA1c 7%-8% (H)       ferrous sulfate 325 (65 FE) MG  tablet    IRON    200 tablet    Take 1 tablet (325 mg) by mouth 2 times daily    Other iron deficiency anemia       gabapentin 100 MG capsule    NEURONTIN    30 capsule    Take 1 capsule (100 mg) by mouth At Bedtime    Mild episode of recurrent major depressive disorder (H)       glipiZIDE 10 MG 24 hr tablet    GLUCOTROL XL    90 tablet    Take 1 tablet (10 mg) by mouth daily    DM type 2, goal HbA1c 7%-8% (H)       HYDROcodone-acetaminophen 5-325 MG per tablet    NORCO    60 tablet    Take 2 tablets by mouth every 6 hours as needed for moderate to severe pain    Brain tumor (H)       hydrOXYzine 25 MG tablet    ATARAX    180 tablet    Take 1 tablet (25 mg) by mouth 2 times daily    Itching, Anxiety       lactulose 10 GM/15ML solution    CHRONULAC    473 mL    Take 7.5 mLs (5 g) by mouth 2 times daily    Hepatic encephalopathy (H)       levothyroxine 88 MCG tablet    SYNTHROID/LEVOTHROID    90 tablet    Take 1 tablet (88 mcg) by mouth daily    Hypothyroidism       lidocaine 4 % Crea cream    LMX4    133 g    Apply topically once as needed for mild pain    Port catheter in place       methylphenidate 10 MG tablet    RITALIN    60 tablet    Take 10mg daily for one week, then increase to 10mg twice a day    Major depressive disorder with single episode, in partial remission (H)       mirtazapine 45 MG tablet    REMERON    30 tablet    Take 1 tablet (45 mg) by mouth At Bedtime    Major depressive disorder with single episode, in partial remission (H)       multivitamin, therapeutic with minerals Tabs tablet      Take 1 tablet by mouth 2 times daily        omeprazole 20 MG CR capsule    priLOSEC    360 capsule    Take 2 capsules (40 mg) by mouth 2 times daily    Gastroesophageal reflux disease without esophagitis       ONETOUCH LANCETS Misc     100 each    by In Vitro route 4 times daily as needed    Type II or unspecified type diabetes mellitus without mention of complication, not stated as uncontrolled       phenytoin  30 MG CR capsule    DILANTIN    720 capsule    Take 4 capsules (120 mg) by mouth 2 times daily    Oligoastrocytoma of parietal lobe (H)       pravastatin 80 MG tablet    PRAVACHOL    90 tablet    TAKE ONE TABLET BY MOUTH ONCE DAILY    High cholesterol       rifaximin 550 MG Tabs tablet    XIFAXAN    60 tablet    Take 1 tablet (550 mg) by mouth 2 times daily    Hepatic encephalopathy (H)       simethicone 80 MG chewable tablet    MYLICON    180 tablet    Take 1 tablet (80 mg) by mouth every 6 hours as needed for cramping    Flatulence, eructation and gas pain       sulfamethoxazole-trimethoprim 800-160 MG per tablet    BACTRIM DS/SEPTRA DS    60 tablet    Take 1 tablet by mouth 2 times daily    Spontaneous bacterial peritonitis (H)       thiamine 100 MG tablet     100 tablet    Take 1 tablet (100 mg) by mouth daily    Alcoholic cirrhosis of liver with ascites (H)       traZODone 50 MG tablet    DESYREL    30 tablet    Take 1 tablet (50 mg) by mouth nightly as needed for sleep    Primary insomnia

## 2017-12-20 NOTE — NURSING NOTE
Chief Complaint   Patient presents with     Abdominal Pain     pt states having pain in stomach, is distended     Rectal Problem     pt states having rectal bleeding, hemmorrhoids     Whitney Garcia CMA at 12:36 PM on 12/20/2017.

## 2017-12-21 NOTE — TELEPHONE ENCOUNTER
APPT INFO    Date /Time: 1/03/2018   Reason for Appt: Rectal Bleeding/Hemorrhoids   Ref Provider/Clinic: Dr Louis, Primary Care   Are there internal records? Yes/No?  IF YES, list clinic names: Yes  See Above   Are there outside records? Yes/No? No   Patient Contact (Y/N) & Call Details: No referred   Action: Reviewed records; Records are in EPIC     OUTSIDE RECORDS CHECKLIST     CLINIC NAME COMMENTS REC (x) IMG (x)

## 2017-12-26 ENCOUNTER — RADIANT APPOINTMENT (OUTPATIENT)
Dept: ULTRASOUND IMAGING | Facility: CLINIC | Age: 47
End: 2017-12-26
Attending: INTERNAL MEDICINE
Payer: MEDICARE

## 2017-12-26 ENCOUNTER — TELEPHONE (OUTPATIENT)
Dept: GASTROENTEROLOGY | Facility: CLINIC | Age: 47
End: 2017-12-26

## 2017-12-26 ENCOUNTER — OFFICE VISIT (OUTPATIENT)
Dept: INFUSION THERAPY | Facility: CLINIC | Age: 47
End: 2017-12-26
Attending: INTERNAL MEDICINE
Payer: MEDICARE

## 2017-12-26 ENCOUNTER — MYC REFILL (OUTPATIENT)
Dept: PSYCHIATRY | Facility: CLINIC | Age: 47
End: 2017-12-26

## 2017-12-26 ENCOUNTER — MYC REFILL (OUTPATIENT)
Dept: FAMILY MEDICINE | Facility: CLINIC | Age: 47
End: 2017-12-26

## 2017-12-26 VITALS
DIASTOLIC BLOOD PRESSURE: 75 MMHG | BODY MASS INDEX: 30.94 KG/M2 | SYSTOLIC BLOOD PRESSURE: 125 MMHG | HEART RATE: 75 BPM | TEMPERATURE: 98.3 F | WEIGHT: 215.6 LBS

## 2017-12-26 DIAGNOSIS — Z95.828 PORT CATHETER IN PLACE: ICD-10-CM

## 2017-12-26 DIAGNOSIS — K74.60 CIRRHOSIS OF LIVER WITH ASCITES, UNSPECIFIED HEPATIC CIRRHOSIS TYPE (H): Primary | ICD-10-CM

## 2017-12-26 DIAGNOSIS — R18.8 CIRRHOSIS OF LIVER WITH ASCITES, UNSPECIFIED HEPATIC CIRRHOSIS TYPE (H): Primary | ICD-10-CM

## 2017-12-26 DIAGNOSIS — F32.4 MAJOR DEPRESSIVE DISORDER WITH SINGLE EPISODE, IN PARTIAL REMISSION (H): ICD-10-CM

## 2017-12-26 LAB
ALBUMIN FLD-MCNC: 0.5 G/DL
APPEARANCE FLD: NORMAL
BACTERIA SPEC CULT: NORMAL
BASOPHILS NFR FLD MANUAL: 1 %
COLOR FLD: YELLOW
LYMPHOCYTES NFR FLD MANUAL: 10 %
NEUTS BAND NFR FLD MANUAL: 9 %
OTHER CELLS FLD MANUAL: 80 %
SPECIMEN SOURCE FLD: NORMAL
SPECIMEN SOURCE FLD: NORMAL
SPECIMEN SOURCE: NORMAL
WBC # FLD AUTO: 249 /UL

## 2017-12-26 PROCEDURE — 87070 CULTURE OTHR SPECIMN AEROBIC: CPT | Performed by: INTERNAL MEDICINE

## 2017-12-26 PROCEDURE — 25000128 H RX IP 250 OP 636: Mod: ZF | Performed by: INTERNAL MEDICINE

## 2017-12-26 PROCEDURE — 82042 OTHER SOURCE ALBUMIN QUAN EA: CPT | Performed by: INTERNAL MEDICINE

## 2017-12-26 PROCEDURE — 87075 CULTR BACTERIA EXCEPT BLOOD: CPT | Performed by: INTERNAL MEDICINE

## 2017-12-26 PROCEDURE — 27210190 US PARACENTESIS

## 2017-12-26 PROCEDURE — 25000125 ZZHC RX 250: Mod: ZF | Performed by: INTERNAL MEDICINE

## 2017-12-26 PROCEDURE — P9047 ALBUMIN (HUMAN), 25%, 50ML: HCPCS | Mod: ZF | Performed by: INTERNAL MEDICINE

## 2017-12-26 PROCEDURE — 89051 BODY FLUID CELL COUNT: CPT | Performed by: INTERNAL MEDICINE

## 2017-12-26 RX ORDER — ALBUMIN (HUMAN) 12.5 G/50ML
12.5 SOLUTION INTRAVENOUS 4 TIMES DAILY PRN
Status: CANCELLED
Start: 2017-12-26

## 2017-12-26 RX ORDER — MIRTAZAPINE 45 MG/1
45 TABLET, FILM COATED ORAL AT BEDTIME
Qty: 30 TABLET | Refills: 1 | Status: CANCELLED | OUTPATIENT
Start: 2017-12-26

## 2017-12-26 RX ORDER — METHYLPHENIDATE HYDROCHLORIDE 10 MG/1
TABLET ORAL
Qty: 60 TABLET | Refills: 0 | Status: CANCELLED | OUTPATIENT
Start: 2017-12-26

## 2017-12-26 RX ORDER — ALBUMIN (HUMAN) 12.5 G/50ML
12.5 SOLUTION INTRAVENOUS 4 TIMES DAILY PRN
Status: DISCONTINUED | OUTPATIENT
Start: 2017-12-26 | End: 2017-12-26 | Stop reason: HOSPADM

## 2017-12-26 RX ORDER — LIDOCAINE 40 MG/G
CREAM TOPICAL
Qty: 133 G | Refills: 1 | Status: SHIPPED | OUTPATIENT
Start: 2017-12-26 | End: 2019-01-01

## 2017-12-26 RX ADMIN — LIDOCAINE HYDROCHLORIDE 10 ML: 10 INJECTION, SOLUTION INFILTRATION; PERINEURAL at 10:29

## 2017-12-26 RX ADMIN — ALBUMIN HUMAN 50 G: 0.25 SOLUTION INTRAVENOUS at 10:29

## 2017-12-26 NOTE — PROGRESS NOTES
Paracentesis Nursing Note  Mono Varghese presents today to [facility] for a paracentesis.    During today's appointment orders from Beatriz Tanner MD were completed.    Progress Note:  Patient identification verified by name and date of birth.  Assessment completed.  Vitals monitored throughout appointment and recorded in Doc Flowsheets.  See proceduralist note in ultrasound.    Date of consent or authorization: 12/26/17  .  Invasive Procedure Safety Checklist was completed and sent for scanning.     Paracentesis performed by Dr. King Casarez.    The following labs were communicated to provider performing paracentesis:  Lab Results   Component Value Date    PLT 40 12/20/2017       Total amount of ascites fluid drained: 3.7 liters.  Color of ascites fluid: yellow and clear.  Total amount of albumin given: 50  grams.    Patient tolerated procedure well.    Post procedure,denies pain or discomfort post paracentesis.      Discharge Plan:  Discharge instructions were reviewed with patient.  Patient/Representative verbalized understanding and all questions were answered.   Discharged from [facility] in stable condition.    Regina Haddad RN      Administrations This Visit     albumin human 25 % injection 12.5 g     Admin Date Action Dose Route Administered By             12/26/2017 New Bag 50 g Intravenous Regina Haddad RN                    lidocaine 1 % 20 mL     Admin Date Action Dose Route Administered By             12/26/2017 Given by Other Clinician 10 mL Other Regina Haddad RN                          /84  Pulse 78  Temp 98.3  F (36.8  C)  Wt 101.2 kg (223 lb)  BMI 32 kg/m2

## 2017-12-26 NOTE — TELEPHONE ENCOUNTER
Patient scheduled for upper endoscopy    Indication for procedure. Follow up varices    Referring Provider.Dr. Louis    ? no    Arrival time verified? Yes, 8:30 am arrival    Facility location verified? [address]    Instructions given regarding prep and procedure    Prep Type NPO for 6 hours    Are you taking any anticoagulants or blood thinners? no    Instructions given? yes    Electronic implanted devices? no    Pre procedure teaching completed? Yes    Transportation from procedure? Yes,     H&P / Pre op physical completed? Will schedule with PAC    Idania Almanzar RN

## 2017-12-26 NOTE — TELEPHONE ENCOUNTER
[facility]  Original authorizing provider: MD Mono Cervantes would like a refill of the following medications:  lidocaine (LMX4) 4 % CREA cream [King Louis MD]    Preferred pharmacy: [address] [address] [facility]    Comment:      Medication renewals requested in this message routed to other providers:  mirtazapine (REMERON) 45 MG tablet [Devendra Almanzar MD]  methylphenidate (RITALIN) 10 MG tablet [Devendra Almanzar MD]

## 2017-12-26 NOTE — MR AVS SNAPSHOT
After Visit Summary   12/26/2017    Mono Varghese    MRN: [id number]           Patient Information     Date Of Birth          1970        Visit Information        Provider Department      12/26/2017 9:30 AM Provider, [facility]; [facility] Specialty and Procedure        Today's Diagnoses     Cirrhosis of liver with ascites, unspecified hepatic cirrhosis type (H)    -  1      Care Instructions      Paracentesis  Your healthcare provider recommends that you have paracentesis. This is a procedure to remove extra fluid from your belly (abdomen). A needle is used to drain the fluid. A small sample of fluid may be taken and tested for problems. If the fluid buildup is causing discomfort or pain, all of the fluid may be drained. To do this, a tube is attached to the needle. The fluid is drained into a container that sits outside of the body. If symptoms are severe, paracentesis may be done as an emergency procedure. Otherwise, it will be scheduled ahead of time. Read on to learn more about paracentesis and how it works.     Understanding ascites  Many of the body s organs, including the liver and intestines, are inside the belly (abdomen). The organs are covered in a thin membrane called the peritoneum. The peritoneum has 2 layers. It makes a fluid that allows the layers to glide smoothly past each other. If this fluid builds up in the belly, the condition is called ascites. Ascites causes pain and discomfort. It can also make it hard to breathe. Fluid can build up for a number of reasons. These include chronic liver disease (cirrhosis), heart or kidney failure, and cancer. Your provider can tell you more about the cause of your ascites.  How paracentesis works  The goal of paracentesis may be to help diagnose the cause of the excess fluid. Or, the goal may be to drain excess fluid from the abdomen. In some cases, fluid returns and the procedure needs to be  repeated.  Before the procedure    Tell your provider about any medicines you are taking. This includes all prescription medicines, over-the-counter medicines, street drugs, herbs, vitamins, and other supplements.    Tell your provider about any allergies you have.    Before the procedure begins, you ll be asked to empty your bladder. This helps prevent injury to the bladder during the procedure. If needed, a thin tube (Munoz catheter) may be placed into your bladder to drain urine during the procedure. This tube is removed after the procedure.    An IV (intravenous) line may be put into a vein in your arm or hand. This line supplies fluids and medicines.  During the procedure    You are awake during the procedure.    An imaging method called ultrasound may be used to guide the procedure. It shows live images of the inside of your belly on a video screen. This helps the provider find the site of the excess fluid inside your belly and decide where to insert the needle.    A numbing medicine (local anesthesia) is injected into your belly where the needle will be inserted.    Once the skin is numb, the provider carefully inserts the needle into the belly. This causes the needle to fill with fluid.    The needle may be removed with only a small sample of fluid. This sample is sent to a lab for testing. Getting a sample takes about 10 to 15 minutes.    Or, a tube may be attached to the needle so that more of the excess fluid can be drained. The tube may be taped or stitched into place. This keeps it from pulling the needle out of your belly.    How long it takes to drain all of the fluid varies for each person. In most cases, it takes about 30 minutes. Your provider will let you know if the procedure is expected to take longer than usual.    Once all of the fluid is drained, the needle and tube are removed.    Pressure is put on the puncture site to stop any fluid leakage or bleeding.    A small bandage is placed over  the puncture site. Albumin may be given during or after the procedure to prevent low blood pressure or kidney problems.  After the procedure  You may be taken to a recovery room to rest after the procedure. If you are in pain, you will be given medicine as needed. You will likely be sent home 1 to 2 hours after the procedure is done. When you leave the hospital, have an adult family member or friend drive you home. If you are staying in the hospital, you will return to your hospital room.  Risks and possible complications of paracentesis  This procedure is considered safe. But like all procedures, it carries some risks. These include the following:    Bleeding    Infection    Injury to structures in the belly    Fast drop in blood pressure   Recovering at home    If needed, your provider can prescribe or recommend pain medicines for you to take at home. Take these exactly as directed. If you stopped taking other medicines before the procedure, ask your provider when you can start them again.    You may remove the bandage 24 hours after the procedure.    Take it easy for 24 hours after the procedure. Avoid physical activity until your provider says it s OK.  Follow-up care  Make a follow-up appointment with your provider as directed. During your follow-up visit, your provider will check your healing. Let your provider know how you are feeling. You can also discuss the cause of your ascites and if any more treatment is needed.   When to seek medical care  Call your healthcare provider if you notice any of the following after the procedure:    A fever of 100.4 F (38.0 C) or higher    Trouble breathing    Pain that does not go away even after taking pain medicine    Belly pain not caused by having the skin punctured    Bleeding from the puncture site    More than a small amount of fluid leakage from the puncture site    Swollen belly    Signs of infection at the puncture site. These include increased pain, redness, or  swelling, as well as warmth or bad-smelling drainage.    Blood in your urine    Dizziness, lightheadedness, or fainting   Date Last Reviewed: 7/1/2016 2000-2017 [employer]. [address] [place] [address]. All rights reserved. This information is not intended as a substitute for professional medical care. Always follow your healthcare professional's instructions.                Follow-ups after your visit        Your next 10 appointments already scheduled     Dec 29, 2017  2:30 PM CST   (Arrive by 2:15 PM)   PAC EVALUATION with  Pac Renato 6   [facility] Pre[facility] ([facility])    [address] [place] [address]   [phone]            Dec 29, 2017  3:30 PM CST   (Arrive by 3:15 PM)   PAC RN ASSESSMENT with  Pac Rn   [facility] Preoperative Assessment [place] ([facility])    [address] [place] [address]   [phone]            Dec 29, 2017  3:50 PM CST   (Arrive by 3:35 PM)   PAC Anesthesia Consult with  Pac Anesthesiologist   [facility] ([facility])    [address] [place] [address]   [phone]            Jan 03, 2018  1:45 PM CST   (Arrive by 1:30 PM)   New Patient Visit with TIESHA Scanlon CNP   [facility] Colon and Rectal Surgery ([facility])    [address] [place] [address]   [phone]            Jan 04, 2018   Procedure with Jordana Ramirez MD   [facility] [place] Endoscopy ([facility])    [address] [facility] [address]   [phone]           [facility] is located on the corner of [facility] and [facility] on the [facility]. It is easily accessible from virtually any point in the  [facility], via [address].            Jan 23, 2018  8:45 AM CST   Adult Med Follow UP with Devendra Almanzar MD   [facility] ([facility])    [address]  [address] [facility] [address]   [phone]            Jan 23, 2018 10:30 AM CST   (Arrive by 10:15 AM)   Return Visit with King Louis MD   [facility] ([facility])    [address] [place] [address]  4th Floor  [facility] [address] [phone]            Apr 17, 2018  9:00 AM CDT   Lab with  LAB   [facility] Lab ([facility])    [address] [place] [address]  [address] [facility] [address] [phone]            Apr 17, 2018  9:40 AM CDT   (Arrive by 9:25 AM)   Return General Liver with Beatriz Tanner MD   [facility] Hepatology ([facility])    [address] [place] [address]  3rd [facility] [address] [phone]            Apr 27, 2018 11:30 AM CDT   (Arrive by 11:15 AM)   MR BRAIN W/O & W CONTRAST with [address] MRI ([facility])    [address] [place] [address] [phone]           Take your medicines as usual, unless your doctor tells you not to. Bring a list of your current medicines to your exam (including vitamins, minerals and over-the-counter drugs).  You will be given intravenous contrast for this exam. To prepare:   The day before your exam, drink extra fluids at least six 8-ounce glasses (unless your doctor tells you to restrict your fluids).   Have a blood test (creatinine test) within 30 days of your exam. Go to your clinic or Diagnostic Imaging Department for this test.  The MRI machine uses a strong magnet. Please wear clothes without metal (snaps, zippers). A sweatsuit works well, or we may give you a hospital gown.  Please remove any body piercings and hair extensions before you arrive. You will  also remove watches, jewelry, hairpins, wallets, dentures, partial dental plates and hearing aids. You may wear contact lenses, and you may be able to wear your rings. We have a safe place to keep your personal items, but it is safer to leave them at home.   **IMPORTANT** THE INSTRUCTIONS BELOW ARE ONLY FOR THOSE PATIENTS WHO HAVE BEEN TOLD THEY WILL RECEIVE SEDATION OR GENERAL ANESTHESIA DURING THEIR MRI PROCEDURE:  IF YOU WILL RECEIVE SEDATION (take medicine to help you relax during your exam):   You must get the medicine from your doctor before you arrive. Bring the medicine to the exam. Do not take it at home.   Arrive one hour early. Bring someone who can take you home after the test. Your medicine will make you sleepy. After the exam, you may not drive, take a bus or take a taxi by yourself.   No eating 8 hours before your exam. You may have clear liquids up until 4 hours before your exam. (Clear liquids include water, clear tea, black coffee and fruit juice without pulp.)  IF YOU WILL RECEIVE ANESTHESIA (be asleep for your exam):   Arrive 1 1/2 hours early. Bring someone who can take you home after the test. You may not drive, take a bus or take a taxi by yourself.   No eating 8 hours before your exam. You may have clear liquids up until 4 hours before your exam. (Clear liquids include water, clear tea, black coffee and fruit juice without pulp.)  Please call the Imaging Department at your exam site with any questions.              Who to contact     If you have questions or need follow up information about today's clinic visit or your schedule please contact [facility] SPECIALTY AND PROCEDURE directly at [phone].  Normal or non-critical lab and imaging results will be communicated to you by MyChart, letter or phone within 4 business days after the clinic has received the results. If you do not hear from us within 7 days, please contact the clinic through MyChart or phone. If you  have a critical or abnormal lab result, we will notify you by phone as soon as possible.  Submit refill requests through [employer] or call your pharmacy and they will forward the refill request to us. Please allow 3 business days for your refill to be completed.          Additional Information About Your Visit        [employer] Information     [employer] gives you secure access to your electronic health record. If you see a primary care provider, you can also send messages to your care team and make appointments. If you have questions, please call your primary care clinic.  If you do not have a primary care provider, please call [phone] and they will assist you.        Care EveryWhere ID     This is your Care EveryWhere ID. This could be used by other organizations to access your [facility] medical records  [id number]        Your Vitals Were     Pulse Temperature BMI (Body Mass Index)             75 98.3  F (36.8  C) 30.94 kg/m2          Blood Pressure from Last 3 Encounters:   12/26/17 125/75   12/20/17 128/82   10/19/17 144/77    Weight from Last 3 Encounters:   12/26/17 97.8 kg (215 lb 9.6 oz)   12/20/17 101.3 kg (223 lb 4.8 oz)   10/18/17 97.5 kg (215 lb)              We Performed the Following     Albumin fluid     Anaerobic bacterial culture     Cell count with differential fluid     Miscellaneous Culture Aerobic Bacterial     US Paracentesis        Primary Care Provider Office Phone # Fax #    King Louis [phone]       [address] [address]        Equal Access to Services     THIEN FAIR : Hadii aad ku hadasho Soomaali, waaxda luqadaha, qaybta kaalmada adeegyada, waxay sweta roque . So [facility] [phone].    ATENCIÓN: Si habla español, tiene a chiang disposición servicios gratuitos de asistencia lingüística. Llame al [phone].    We comply with applicable federal civil rights laws and [place] laws. We do not discriminate on the basis of  race, color, national origin, age, disability, sex, sexual orientation, or gender identity.            Thank you!     Thank you for choosing [facility] SPECIALTY AND PROCEDURE  for your care. Our goal is always to provide you with excellent care. Hearing back from our patients is one way we can continue to improve our services. Please take a few minutes to complete the written survey that you may receive in the mail after your visit with us. Thank you!             Your Updated Medication List - Protect others around you: Learn how to safely use, store and throw away your medicines at www.disposemymeds.org.          This list is accurate as of: 12/26/17 11:14 AM.  Always use your most recent med list.                   Brand Name Dispense Instructions for use Diagnosis    azithromycin 250 MG tablet    ZITHROMAX    6 tablet    Two tablets first day, then one tablet daily for four days.    Anemia, unspecified type       blood glucose monitoring test strip    ONETOUCH ULTRA    300 each    Use to test blood sugars 4 times daily as needed or as directed.    Diabetes mellitus, type 2 (H)       Calcium Carb-Cholecalciferol 500-400 MG-UNIT Tabs    calcium 500 +D    90 tablet    Take 500 mg by mouth daily    Malnutrition (H)       CANE, ANY MATERIAL     1 each    One cane    Balance disorder       cholecalciferol 1000 UNITS capsule    vitamin  -D    90 capsule    Take 1 capsule (1,000 Units) by mouth daily    Malnutrition (H)       cyanocobalamin 1000 MCG tablet    vitamin  B-12    130 tablet    1 tablet (1,000 mcg) by Per G Tube route daily    Alcoholic cirrhosis of liver with ascites (H)       dulaglutide 1.5 MG/0.5ML pen    TRULICITY    6 mL    Inject 1.5 mg Subcutaneous every 7 days    DM type 2, goal HbA1c 7%-8% (H)       ferrous sulfate 325 (65 FE) MG tablet    IRON    200 tablet    Take 1 tablet (325 mg) by mouth 2 times daily    Other iron deficiency anemia       gabapentin 100 MG capsule     NEURONTIN    30 capsule    Take 1 capsule (100 mg) by mouth At Bedtime    Mild episode of recurrent major depressive disorder (H)       glipiZIDE 10 MG 24 hr tablet    GLUCOTROL XL    90 tablet    Take 1 tablet (10 mg) by mouth daily    DM type 2, goal HbA1c 7%-8% (H)       HYDROcodone-acetaminophen 5-325 MG per tablet    NORCO    60 tablet    Take 2 tablets by mouth every 6 hours as needed for moderate to severe pain    Brain tumor (H)       hydrOXYzine 25 MG tablet    ATARAX    180 tablet    Take 1 tablet (25 mg) by mouth 2 times daily    Itching, Anxiety       lactulose 10 GM/15ML solution    CHRONULAC    473 mL    Take 7.5 mLs (5 g) by mouth 2 times daily    Hepatic encephalopathy (H)       levothyroxine 88 MCG tablet    SYNTHROID/LEVOTHROID    90 tablet    Take 1 tablet (88 mcg) by mouth daily    Hypothyroidism       lidocaine 4 % Crea cream    LMX4    133 g    Apply topically once as needed for mild pain    Port catheter in place       methylphenidate 10 MG tablet    RITALIN    60 tablet    Take 10mg daily for one week, then increase to 10mg twice a day    Major depressive disorder with single episode, in partial remission (H)       mirtazapine 45 MG tablet    REMERON    30 tablet    Take 1 tablet (45 mg) by mouth At Bedtime    Major depressive disorder with single episode, in partial remission (H)       multivitamin, therapeutic with minerals Tabs tablet      Take 1 tablet by mouth 2 times daily        omeprazole 20 MG CR capsule    priLOSEC    360 capsule    Take 2 capsules (40 mg) by mouth 2 times daily    Gastroesophageal reflux disease without esophagitis       ONETOUCH LANCETS Misc     100 each    by In Vitro route 4 times daily as needed    Type II or unspecified type diabetes mellitus without mention of complication, not stated as uncontrolled       phenytoin 30 MG CR capsule    DILANTIN    720 capsule    Take 4 capsules (120 mg) by mouth 2 times daily    Oligoastrocytoma of parietal lobe (H)        pravastatin 80 MG tablet    PRAVACHOL    90 tablet    TAKE ONE TABLET BY MOUTH ONCE DAILY    High cholesterol       rifaximin 550 MG Tabs tablet    XIFAXAN    60 tablet    Take 1 tablet (550 mg) by mouth 2 times daily    Hepatic encephalopathy (H)       simethicone 80 MG chewable tablet    MYLICON    180 tablet    Take 1 tablet (80 mg) by mouth every 6 hours as needed for cramping    Flatulence, eructation and gas pain       sulfamethoxazole-trimethoprim 800-160 MG per tablet    BACTRIM DS/SEPTRA DS    60 tablet    Take 1 tablet by mouth 2 times daily    Spontaneous bacterial peritonitis (H)       thiamine 100 MG tablet     100 tablet    Take 1 tablet (100 mg) by mouth daily    Alcoholic cirrhosis of liver with ascites (H)       traZODone 50 MG tablet    DESYREL    30 tablet    Take 1 tablet (50 mg) by mouth nightly as needed for sleep    Primary insomnia

## 2017-12-26 NOTE — PATIENT INSTRUCTIONS
Paracentesis  Your healthcare provider recommends that you have paracentesis. This is a procedure to remove extra fluid from your belly (abdomen). A needle is used to drain the fluid. A small sample of fluid may be taken and tested for problems. If the fluid buildup is causing discomfort or pain, all of the fluid may be drained. To do this, a tube is attached to the needle. The fluid is drained into a container that sits outside of the body. If symptoms are severe, paracentesis may be done as an emergency procedure. Otherwise, it will be scheduled ahead of time. Read on to learn more about paracentesis and how it works.     Understanding ascites  Many of the body s organs, including the liver and intestines, are inside the belly (abdomen). The organs are covered in a thin membrane called the peritoneum. The peritoneum has 2 layers. It makes a fluid that allows the layers to glide smoothly past each other. If this fluid builds up in the belly, the condition is called ascites. Ascites causes pain and discomfort. It can also make it hard to breathe. Fluid can build up for a number of reasons. These include chronic liver disease (cirrhosis), heart or kidney failure, and cancer. Your provider can tell you more about the cause of your ascites.  How paracentesis works  The goal of paracentesis may be to help diagnose the cause of the excess fluid. Or, the goal may be to drain excess fluid from the abdomen. In some cases, fluid returns and the procedure needs to be repeated.  Before the procedure    Tell your provider about any medicines you are taking. This includes all prescription medicines, over-the-counter medicines, street drugs, herbs, vitamins, and other supplements.    Tell your provider about any allergies you have.    Before the procedure begins, you ll be asked to empty your bladder. This helps prevent injury to the bladder during the procedure. If needed, a thin tube (Munoz catheter) may be placed into your  bladder to drain urine during the procedure. This tube is removed after the procedure.    An IV (intravenous) line may be put into a vein in your arm or hand. This line supplies fluids and medicines.  During the procedure    You are awake during the procedure.    An imaging method called ultrasound may be used to guide the procedure. It shows live images of the inside of your belly on a video screen. This helps the provider find the site of the excess fluid inside your belly and decide where to insert the needle.    A numbing medicine (local anesthesia) is injected into your belly where the needle will be inserted.    Once the skin is numb, the provider carefully inserts the needle into the belly. This causes the needle to fill with fluid.    The needle may be removed with only a small sample of fluid. This sample is sent to a lab for testing. Getting a sample takes about 10 to 15 minutes.    Or, a tube may be attached to the needle so that more of the excess fluid can be drained. The tube may be taped or stitched into place. This keeps it from pulling the needle out of your belly.    How long it takes to drain all of the fluid varies for each person. In most cases, it takes about 30 minutes. Your provider will let you know if the procedure is expected to take longer than usual.    Once all of the fluid is drained, the needle and tube are removed.    Pressure is put on the puncture site to stop any fluid leakage or bleeding.    A small bandage is placed over the puncture site. Albumin may be given during or after the procedure to prevent low blood pressure or kidney problems.  After the procedure  You may be taken to a recovery room to rest after the procedure. If you are in pain, you will be given medicine as needed. You will likely be sent home 1 to 2 hours after the procedure is done. When you leave the hospital, have an adult family member or friend drive you home. If you are staying in the hospital, you will  return to your hospital room.  Risks and possible complications of paracentesis  This procedure is considered safe. But like all procedures, it carries some risks. These include the following:    Bleeding    Infection    Injury to structures in the belly    Fast drop in blood pressure   Recovering at home    If needed, your provider can prescribe or recommend pain medicines for you to take at home. Take these exactly as directed. If you stopped taking other medicines before the procedure, ask your provider when you can start them again.    You may remove the bandage 24 hours after the procedure.    Take it easy for 24 hours after the procedure. Avoid physical activity until your provider says it s OK.  Follow-up care  Make a follow-up appointment with your provider as directed. During your follow-up visit, your provider will check your healing. Let your provider know how you are feeling. You can also discuss the cause of your ascites and if any more treatment is needed.   When to seek medical care  Call your healthcare provider if you notice any of the following after the procedure:    A fever of 100.4 F (38.0 C) or higher    Trouble breathing    Pain that does not go away even after taking pain medicine    Belly pain not caused by having the skin punctured    Bleeding from the puncture site    More than a small amount of fluid leakage from the puncture site    Swollen belly    Signs of infection at the puncture site. These include increased pain, redness, or swelling, as well as warmth or bad-smelling drainage.    Blood in your urine    Dizziness, lightheadedness, or fainting   Date Last Reviewed: 7/1/2016 2000-2017 [employer]. [address] [place] [address], [place] [address]. All rights reserved. This information is not intended as a substitute for professional medical care. Always follow your healthcare professional's instructions.

## 2017-12-28 ENCOUNTER — MYC MEDICAL ADVICE (OUTPATIENT)
Dept: FAMILY MEDICINE | Facility: CLINIC | Age: 47
End: 2017-12-28

## 2017-12-28 RX ORDER — METHYLPHENIDATE HYDROCHLORIDE 10 MG/1
TABLET ORAL
Qty: 60 TABLET | Refills: 0 | Status: SHIPPED | OUTPATIENT
Start: 2017-12-28 | End: 2018-01-23

## 2017-12-28 RX ORDER — MIRTAZAPINE 45 MG/1
45 TABLET, FILM COATED ORAL AT BEDTIME
Qty: 30 TABLET | Refills: 0 | Status: SHIPPED | OUTPATIENT
Start: 2017-12-28 | End: 2018-01-21

## 2017-12-28 NOTE — TELEPHONE ENCOUNTER
Dr. Roa available to sign script.  Script printed and signed.    Spoke with pt's spouse Yisel.  Informed her that Remeron prescription has been submitted.  Explained the delay in Ritalin refill and stated that new prescription is signed and available for mailing or .  Yisel states that she will have either her mother (likely accompanied by pt) pick this up in clinic tomorrow.  Mom's name is Caitie Dale.      Script placed in envelope and available at  for p/u.

## 2017-12-28 NOTE — TELEPHONE ENCOUNTER
Gladys Toscano Michelle, RN        Phone Number: [phone]                     Caller:  Yisel, spouse   Medication:  Ritalin, mertazapine   Pharmacy and location:  [facility] in [place] (mail script to pharmacy)   Have you contacted the pharmacy: yes   How much of medication do you have left:  out   Pending appt: 1/23/18   Okay to leave VM:  yes     Patient says they have called several times regarding the Ritalin script but were told there was some question Dr. Almanzar needed to answer and the prescription still has not been sent.  Mono has been out of Ritalin for 2-3 weeks, per his spouse.       Last seen: 10/19/17  RTC: 2-3 weeks  Cancel: none  No-show: none  Next appt: 1/23/18    Writer previously routed refill request for Ritalin to Dr. Almanzar.  See refill note dated 12/15/17.  Per review of EMR, a prescription was printed for Ritalin on 12/20/17.    methylphenidate (RITALIN) 10 MG tablet 60 tablet 0 12/20/2017  No   Sig: Take 10mg daily for one week, then increase to 10mg twice a day   Class: Local Print     Routed to Dr. Almanzar for update on script and auth to refill Remeron.

## 2017-12-28 NOTE — TELEPHONE ENCOUNTER
Dr. Almanzar authorizes refills, however is not in clinic to sign script.    Routed to covering provider to assist.

## 2017-12-29 ENCOUNTER — APPOINTMENT (OUTPATIENT)
Dept: SURGERY | Facility: CLINIC | Age: 47
End: 2017-12-29
Payer: MEDICARE

## 2017-12-29 ENCOUNTER — ANESTHESIA EVENT (OUTPATIENT)
Dept: SURGERY | Facility: CLINIC | Age: 47
End: 2017-12-29
Payer: MEDICARE

## 2017-12-29 ENCOUNTER — OFFICE VISIT (OUTPATIENT)
Dept: SURGERY | Facility: CLINIC | Age: 47
End: 2017-12-29
Payer: MEDICARE

## 2017-12-29 VITALS
SYSTOLIC BLOOD PRESSURE: 148 MMHG | RESPIRATION RATE: 18 BRPM | OXYGEN SATURATION: 100 % | TEMPERATURE: 98.3 F | HEIGHT: 70 IN | BODY MASS INDEX: 31.28 KG/M2 | HEART RATE: 86 BPM | DIASTOLIC BLOOD PRESSURE: 91 MMHG | WEIGHT: 218.5 LBS

## 2017-12-29 DIAGNOSIS — Z01.818 PRE-OPERATIVE GENERAL PHYSICAL EXAMINATION: Primary | ICD-10-CM

## 2017-12-29 DIAGNOSIS — I85.00 ESOPHAGEAL VARICES WITHOUT BLEEDING, UNSPECIFIED ESOPHAGEAL VARICES TYPE (H): ICD-10-CM

## 2017-12-29 ASSESSMENT — ENCOUNTER SYMPTOMS: SEIZURES: 1

## 2017-12-29 NOTE — ANESTHESIA PREPROCEDURE EVALUATION
Anesthesia Evaluation     . Pt has had prior anesthetic. Type: General and MAC    No history of anesthetic complications          ROS/MED HX    ENT/Pulmonary:     (+)sleep apnea, doesn't use CPAP , recent URI resolved resolving cold, cough perssts, no fever, rhinorrhea resolved.: . .    Neurologic:     (+)seizures last seizure: 1 year ago features: staring , other neuro brain tumor; oligocytoma and astrocytoma-resected    Cardiovascular:  - neg cardiovascular ROS       METS/Exercise Tolerance:  3 - Able to walk 1-2 blocks without stopping   Hematologic:     (+) History of blood clots pt is not anticoagulated, Anemia, History of Transfusion no previous transfusion reaction -      Musculoskeletal:  - neg musculoskeletal ROS       GI/Hepatic: Comment: Esophageal varices and ascites present    (+) GERD Asymptomatic on medication, liver disease, Other GI/Hepatic alcohol contributed to cirrhosis; pt adopted- genetics unknown      Renal/Genitourinary:  - ROS Renal section negative       Endo:     (+) type II DM Last HgA1c: 7.8 Not using insulin - not using insulin pump thyroid problem hypothyroidism, .      Psychiatric:     (+) psychiatric history depression      Infectious Disease:  - neg infectious disease ROS       Malignancy:   (+) Malignancy History of Other  Other CA brain cancer 2011 status post Surgery, Chemo and Radiation         Other:    (+) No chance of pregnancy no H/O Chronic Pain,other significant disability Other (comment)                   Physical Exam      Airway   Mallampati: III  TM distance: >3 FB  Neck ROM: full    Dental   (+) missing    Cardiovascular   Rhythm and rate: regular and normal      Pulmonary    breath sounds clear to auscultation    Other findings: LABS:  CBC RESULTS: Lab Test        12/20/17                       1408          WBC          1.9*          RBC          3.90*         HGB          12.8*         HCT          38.3*         MCV          98            MCH          32.8           MCHC         33.4          RDW          14.6          PLT          40*           Last Basic Metabolic Panel:  Lab Results      Component                Value               Date                      NA                       137                 12/20/2017             Lab Results      Component                Value               Date                      POTASSIUM                4.0                 12/20/2017            Lab Results      Component                Value               Date                      CHLORIDE                 106                 12/20/2017            Lab Results      Component                Value               Date                      UCHE                      8.2                 12/20/2017            Lab Results      Component                Value               Date                      CO2                      24                  12/20/2017            Lab Results      Component                Value               Date                      BUN                      7                   12/20/2017            Lab Results      Component                Value               Date                      CR                       0.89                12/20/2017            Lab Results      Component                Value               Date                      GLC                      176                 12/20/2017                     PAC Discussion and Assessment    ASA Classification: 3  Case is suitable for:   Anesthetic techniques and relevant risks discussed: MAC with GA as backup  Invasive monitoring and risk discussed:   Types:   Possibility and Risk of blood transfusion discussed:   NPO instructions given:   Additional anesthetic preparation and risks discussed:   Needs early admission to pre-op area:   Other:     PAC Resident/NP Anesthesia Assessment:  47 year old male for EGD for evaluation of esophageal varices, mild at last EGC a year ago, with Dr. Jordana Ramirez, on 1/4/18, under MAC.  PAC  referral for risk assessment and optimization for anesthesia with comorbid conditions of:    1.) GI: Alcohol induced liver cirrhosis. Last ETOH > 2 yrs ago. Complications: malnutrition, thromboses (DVT, portal vein and superior mesenteric) in setting of 2015  ICU admission for GI bleed due to esophageal varices with hemorraghic shock . JOSEF. Temporary IVC filter removed. Chronic pancytopenia since 2012 (wbc 1.9, HGB 12.8, platelets 40,000). Hepatic encephalopathy with SBP 2015. On Bactrim and lactulose, also PPI. Cognitive issues of confusion / memory loss present initially after brain tumor and persisted.   He required a 4 Liter paracentesis last week. Down from 223 to 215, now up 218. Taking all meds. Memory impaired. Denies active bleeding, pain, stools dark due to iron, no new confusion. Followed byPMD/GI. Nursing home visits weekly. GI- Dr. Man.  - Dr. Ramirez notified by EPIC paging of recommendation to change location of procedure to [facility] due to his level of  illness.  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 Last seizure last fall. On Dilantin.   3.) Cardiac: HLD. No known CAD. EKG 2/11/16 NSR. ECHO 4/2013 LV function normal with EF 60-65% with repeat-limited study 11/2015 hyperdynamic LV EF 65-70%, normal valves.  4.) Pulmonary: LENA (AHI = 16) , CPAP not recommended. Pulmonary nodules.   5.) Endo; Hypothyroid on synthroid. Take DS. Type 2 diabestes on glucotrol and trulicity.   Chronic depression on desyrel and lexapro.  Last GA 2013 for craniotomy with no anesthesia complications. Multiple EGDs under MAC.  Pt also evaluated by Dr. Kirk     Reviewed and Signed by PAC Mid-Level Provider/Resident    Reviewed and Signed by PAC Mid-Level Provider/Resident  Mid-Level Provider/Resident: Brook Keller PA-C  Date: 12/29/17  Time: 4:51 PM    Attending Anesthesiologist Anesthesia Assessment:  47 year old for EGD in evaluation of  esophageal varices in the setting of cirrhosis with associated ascites, pancytopenia, thromboses. Patient/case discussed with JUANCARLOS. No need to see patient. Patient is appropriate for the planned procedure without further work-up or medical management; however have asked the team to move this case to the OR rather than GI suite given his significant ascites and possibility of bleed.       Reviewed and Signed by PAC Anesthesiologist  Anesthesiologist: sen  Date: 12/29/2017  Time:   Pass/Fail: Pass  Disposition:     PAC Pharmacist Assessment:        Pharmacist:   Date:   Time:      Anesthesia Plan      History & Physical Review  History and physical reviewed and following examination; no interval change.    ASA Status:  3 .    NPO Status:  > 8 hours    Plan for General, RSI and ETT with Intravenous and Propofol induction. Maintenance will be Balanced.    PONV prophylaxis:  Ondansetron (or other 5HT-3)  Additional equipment: 2nd IV       46 yo M for EGD in evaluation of esophageal varices in the setting of cirrhosis with associated ascites.  Multiple procedures in GI suite under MAC however given his significant ascites and possibility of bleed will do case under GA with ETT.        Postoperative Care  Postoperative pain management:  Multi-modal analgesia.      Consents  Anesthetic plan, risks, benefits and alternatives discussed with:  Patient.  Use of blood products discussed: Yes.   Use of blood products discussed with Patient.  Consented to blood products.  .                          .

## 2017-12-29 NOTE — H&P
Pre-Operative H & P     CC:  Preoperative exam to assess for increased cardiopulmonary risk while undergoing surgery and anesthesia.    Date of Encounter: 12/29/2017  Primary Care Physician:  [place]  Mono Varghese is a 47 year old male who presents for pre-operative H & P in preparation for EGD for evaluation of esophageal varices, mild at last EGD a year ago, with Dr. Jordana Ramirez, on 1/4/18, at [facility]. He has liver cirrhosis with complications of portal hypertension, thromboses, esophageal varices, ascites and hepatic encephalopathy.  He had a 4 liter paracentesis last week. He feels his GI status is otherwise stable.      History is obtained from the patient.     Past Medical History  Past Medical History:   Diagnosis Date     Brain tumor (H)     Liver cirrhosis     Hx ascites     Hx hepatic encephalopathy     Hx esophageal varices      Liver failure (H)        Past Surgical History  Past Surgical History:   Procedure Laterality Date     COLONOSCOPY N/A 11/27/2015    Procedure: COMBINED COLONOSCOPY, SINGLE OR MULTIPLE BIOPSY/POLYPECTOMY BY BIOPSY;  Surgeon: Ran Thurston MD;  Location:  GI     ESOPHAGOSCOPY, GASTROSCOPY, DUODENOSCOPY (EGD), COMBINED N/A 11/23/2015    Procedure: COMBINED ESOPHAGOSCOPY, GASTROSCOPY, DUODENOSCOPY (EGD);  Surgeon: Rocael Rizvi MD;  Location:  OR     ESOPHAGOSCOPY, GASTROSCOPY, DUODENOSCOPY (EGD), COMBINED N/A 1/25/2017    Procedure: COMBINED ESOPHAGOSCOPY, GASTROSCOPY, DUODENOSCOPY (EGD), BIOPSY SINGLE OR MULTIPLE;  Surgeon: Rocael Tejada MD;  Location:  GI     ESOPHAGOSCOPY, GASTROSCOPY, DUODENOSCOPY (EGD), COMBINED N/A 3/30/2017    Procedure: COMBINED ESOPHAGOSCOPY, GASTROSCOPY, DUODENOSCOPY (EGD);  Surgeon: Jordana Ramirez MD;  Location:  GI     OPTICAL TRACKING SYSTEM CRANIOTOMY, EXCISE TUMOR, COMBINED  6/6/2013    Procedure: COMBINED OPTICAL TRACKING  SYSTEM CRANIOTOMY, EXCISE TUMOR;  Left Stealth Guided Craniotomy , Tumor Resection ;  Surgeon: J Carlos Aranda MD;  Location: [facility] OR     ORTHOPEDIC SURGERY      hand surgery- right     SIGMOIDOSCOPY FLEXIBLE N/A 11/24/2015    Procedure: SIGMOIDOSCOPY FLEXIBLE;  Surgeon: Rocael Rizvi MD;  Location: [facility] GI       Hx of Blood transfusions/reactions: yes no adverse reactions     Hx of abnormal bleeding or anti-platelet use: not currently    Menstrual history: No LMP for male patient.    Steroid use in the last year: no    Personal or FH with difficulty with Anesthesia:  no    Prior to Admission Medications  Current Outpatient Prescriptions   Medication Sig Dispense Refill     mirtazapine (REMERON) 45 MG tablet Take 1 tablet (45 mg) by mouth At Bedtime 30 tablet 0     methylphenidate (RITALIN) 10 MG tablet 10mg twice a day 60 tablet 0     lidocaine (LMX4) 4 % CREA cream Apply topically once as needed for mild pain 133 g 1     HYDROcodone-acetaminophen (NORCO) 5-325 MG per tablet Take 2 tablets by mouth every 6 hours as needed for moderate to severe pain 60 tablet 0     phenytoin (DILANTIN) 30 MG CR capsule Take 4 capsules (120 mg) by mouth 2 times daily 720 capsule 1     glipiZIDE (GLUCOTROL XL) 10 MG 24 hr tablet Take 1 tablet (10 mg) by mouth daily (Patient taking differently: Take 10 mg by mouth every morning ) 90 tablet 0     omeprazole (PRILOSEC) 20 MG CR capsule Take 2 capsules (40 mg) by mouth 2 times daily 360 capsule 3     dulaglutide (TRULICITY) 1.5 MG/0.5ML pen Inject 1.5 mg Subcutaneous every 7 days (Patient taking differently: Inject 1.5 mg Subcutaneous every 7 days Tuesday) 6 mL 1     hydrOXYzine (ATARAX) 25 MG tablet Take 1 tablet (25 mg) by mouth 2 times daily 180 tablet 1     cyanocobalamin (VITAMIN  B-12) 1000 MCG tablet 1 tablet (1,000 mcg) by Per G Tube route daily (Patient taking differently: 1,000 mcg by Per G Tube route every morning ) 130 tablet 2     sulfamethoxazole-trimethoprim (BACTRIM  DS/SEPTRA DS) 800-160 MG per tablet Take 1 tablet by mouth 2 times daily 60 tablet 5     gabapentin (NEURONTIN) 100 MG capsule Take 1 capsule (100 mg) by mouth At Bedtime 30 capsule 3     traZODone (DESYREL) 50 MG tablet Take 1 tablet (50 mg) by mouth nightly as needed for sleep 30 tablet 3     pravastatin (PRAVACHOL) 80 MG tablet TAKE ONE TABLET BY MOUTH ONCE DAILY (Patient taking differently: TAKE ONE TABLET BY MOUTH ONCE DAILY AT BEDTIME) 90 tablet 1     lactulose (CHRONULAC) 10 GM/15ML solution Take 7.5 mLs (5 g) by mouth 2 times daily 473 mL 1     thiamine 100 MG tablet Take 1 tablet (100 mg) by mouth daily (Patient taking differently: Take 100 mg by mouth every morning ) 100 tablet 1     rifaximin (XIFAXAN) 550 MG TABS tablet Take 1 tablet (550 mg) by mouth 2 times daily 60 tablet 11     levothyroxine (SYNTHROID/LEVOTHROID) 88 MCG tablet Take 1 tablet (88 mcg) by mouth daily (Patient taking differently: Take 88 mcg by mouth every morning ) 90 tablet 3     ferrous sulfate (IRON) 325 (65 FE) MG tablet Take 1 tablet (325 mg) by mouth 2 times daily 200 tablet 3     Calcium Carb-Cholecalciferol (CALCIUM 500 +D) 500-400 MG-UNIT TABS Take 500 mg by mouth daily (Patient taking differently: Take 500 mg by mouth every morning ) 90 tablet 1     multivitamin, therapeutic with minerals (THERA-VIT-M) TABS Take 1 tablet by mouth 2 times daily       CANE, ANY MATERIAL One cane 1 each 0     blood glucose monitoring (ONE TOUCH ULTRA) test strip Use to test blood sugars 4 times daily as needed or as directed. 300 each 4     ONE TOUCH LANCETS MISC by In Vitro route 4 times daily as needed 100 each prn       Allergies  Allergies   Allergen Reactions     No Clinical Screening - See Comments      Coban and Surgilast cause itching     Tegaderm Transparent Dressing (Informational Only)      Latex Itching and Rash       Social History  Social History     Social History     Marital status:      Spouse name: Yisel Ospina  of children: 1      Years of education: N/A     Occupational History            Social History Main Topics     Smoking status: Never Smoker     Smokeless tobacco: Never Used     Alcohol use No      Comment: Quit 01/2014     Drug use: No     Sexual activity: Yes     Partners: Female      Comment: not currently      Other Topics Concern     Parent/Sibling W/ Cabg, Mi Or Angioplasty Before 65f 55m? No     Social History Narrative    On disability        Family History  Family History   Problem Relation Age of Onset     Unknown/Adopted Mother      ROS/MED HX    ENT/Pulmonary:     (+)sleep apnea, doesn't use CPAP , recent URI resolved cold, mild cough persists, no fever, rhinorrhea resolved    Neurologic:     (+)seizures last seizure: 1 year ago features: staring , other neuro brain tumor; oligocytoma and astrocytoma-resected 2013   Cardiovascular:  - neg cardiovascular ROS       METS/Exercise Tolerance:  3 - Able to walk 1-2 blocks without stopping   Hematologic:     (+) History of blood clots pt is not anticoagulated,   Anemia,   History of Transfusion no previous transfusion reaction -      Musculoskeletal:  - neg musculoskeletal ROS       GI/Hepatic:     (+) GERD Asymptomatic on medication, liver disease, Other GI/Hepatic alcohol contributed to cirrhosis; pt adopted- genetics unknown      Renal/Genitourinary:  - ROS Renal section negative       Endo:     (+) type II DM Not using insulin - not using insulin pump thyroid problem hypothyroidism, .      Psychiatric:     (+) psychiatric history depression      Infectious Disease:  - neg infectious disease ROS       Malignancy:   (+) Malignancy History of Other  Other CA brain cancer 2013 status post Surgery, Chemo and Radiation         Other:    (+) No chance of pregnancy no H/O Chronic Pain,other significant disability Other (comment)         The complete review of systems is negative other than noted in the HPI or here.   Temp: 98.3  F (36.8  C) Temp src: Oral BP:  "(!) 148/91 Pulse: 86   Resp: 18 SpO2: 100 %         218 lbs 8 oz  5' 10\"   Body mass index is 31.35 kg/(m^2).       Physical Exam  Constitutional: Awake, alert, cooperative, no apparent distress, and appears stated age.  Eyes: Pupils equal, round and reactive to light, extra ocular muscles intact, sclera clear, conjunctiva-slight icteris lower area  HENT: Normocephalic, oral pharynx with moist mucus membranes, poor dentition and chipped and cracked teeth. No goiter appreciated.   Respiratory: Clear to auscultation bilaterally, no crackles or wheezing.  Cardiovascular: Regular rate and rhythm, normal S1 and S2, and no murmur noted.  Carotids +2, no bruits. Trace LE edema.   GI: Normal bowel sounds, soft, tense with rounded contour and bulging flank, non-tender, no masses palpated. Fluid wave not appreciated.   Lymph/Hematologic: No cervical lymphadenopathy and no supraclavicular lymphadenopathy.  Genitourinary:  deferred  Skin: Warm and dry.    Musculoskeletal: Decreased extension ROM of neck. There is no redness, warmth, or swelling of the joints. Gross motor strength is not tested.  Neurologic: Awake and alert. He struggles with memory and seems to compensate with humor.  Cranial nerves II-XII are grossly intact. Gait is normal.   Neuropsychiatric: Calm, cooperative. Normal affect.     Labs: (personally reviewed)  CBC RESULTS:   Recent Labs   Lab Test  12/20/17   1408   WBC  1.9*   RBC  3.90*   HGB  12.8*   HCT  38.3*   MCV  98   MCH  32.8   MCHC  33.4   RDW  14.6   PLT  40*     Last Basic Metabolic Panel:  Lab Results   Component Value Date     12/20/2017      Lab Results   Component Value Date    POTASSIUM 4.0 12/20/2017     Lab Results   Component Value Date    CHLORIDE 106 12/20/2017     Lab Results   Component Value Date    UCHE 8.2 12/20/2017     Lab Results   Component Value Date    CO2 24 12/20/2017     Lab Results   Component Value Date    BUN 7 12/20/2017     Lab Results   Component Value Date    CR " 0.89 12/20/2017     Lab Results   Component Value Date     12/20/2017     HGBA1c = 7.8%   EKG 2/11/16 NSR.   ECHO 4/2013 LV function normal with EF 60-65% with repeat-limited study 11/2015 hyperdynamic LV EF 65-70%, normal valves.    ASSESSMENT and PLAN  Mono Varghese is a 47 year old male scheduled to undergo EGD for evaluation of esophageal varices, mild at last EGD a year ago, with Dr. Jordana Ramirez, on 1/4/18    Pre-operative considerations include:  1.) GI: Alcohol induced liver cirrhosis. Last ETOH > 2 yrs ago. Complications: malnutrition, thromboses (DVT, portal vein and superior mesenteric) in setting of 2015  ICU admission for GI bleed due to esophageal varices with hemorraghic shock . JOSEF. Temporary IVC filter removed. Chronic pancytopenia since 2012 (wbc 1.9, HGB 12.8, platelets 40,000). Hepatic encephalopathy with SBP 2015. On Bactrim and lactulose, also PPI. Cognitive issues of confusion / memory loss present initially after brain tumor and persisted.   He required a 4 Liter paracentesis last week. Down from 223 to 215, now up 218. Taking all meds. Memory impaired. Denies active bleeding, pain, stools dark due to iron, no new confusion. Followed byPMD/GI. Nursing home visits weekly. GI- Dr. Man.  - Dr. Ramirez notified by EPIC paging of recommendation to change location of procedure to [facility] due to his level of  Illness.  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 Last seizure last fall. On Dilantin.   Take dilantin DOS    3.) Cardiac: No known CAD. Risks include: HLD. Pt adopted so does not know family hx.  EKG and echo as above  No further cardiac evaluation indicated    4.) Pulmonary: LNEA (AHI = 16) , CPAP not recommended. Pulmonary nodules.     5.) Endo; Hypothyroid on synthroid. Take DOS. Type 2 diabetes on glucotrol and trulicity.   -Medication holds usually given  by GI. He would need to HOLD oral hypoglycemic agents DOS  -Take synthroid DOS    6.) Chronic depression on desyrel  May take desyrel as prescribed up to and including DOS     He has the following specific operative considerations:   - RCRI : No serious cardiac risks.  0.4% risk of major adverse cardiac event.   - Anesthesia considerations:  Refer to PAC assessment in anesthesia records  - VTE risk: Elevated due to past hx VTE  - Risk of PONV score = 1.  If > 2, anti-emetic intervention recommended.      Patient was discussed with Dr Kirk. Pt appropriate for proposed procedure but recommendation for location to be changed from [facility] to [facility] setting.    Addendum 1/2/18: Mono's wife, Yisel, called me today-she has not been called with a new location, time and date at the hospital. I spoke with GI service and was informed that Dr. Ramirez is on vacation until tomorrow so likely did not receive my EPIC page and texts. I called [phone] to relay recommendation for schedule change. I was referred to Frankie, triage nurse , who will try to get information for rescheduling from hepatology team.    JOSE López  Preoperative Assessment Center  [facility]  Phone: [phone]  Fax: [phone]

## 2017-12-31 LAB
BACTERIA SPEC CULT: NO GROWTH
SPECIMEN SOURCE: NORMAL

## 2018-01-01 ENCOUNTER — APPOINTMENT (OUTPATIENT)
Dept: CT IMAGING | Facility: CLINIC | Age: 48
DRG: 432 | End: 2018-01-01
Payer: MEDICARE

## 2018-01-01 ENCOUNTER — TELEPHONE (OUTPATIENT)
Dept: GASTROENTEROLOGY | Facility: CLINIC | Age: 48
End: 2018-01-01

## 2018-01-01 ENCOUNTER — APPOINTMENT (OUTPATIENT)
Dept: PHYSICAL THERAPY | Facility: CLINIC | Age: 48
DRG: 152 | End: 2018-01-01
Attending: HOSPITALIST
Payer: MEDICARE

## 2018-01-01 ENCOUNTER — OFFICE VISIT (OUTPATIENT)
Dept: PSYCHIATRY | Facility: CLINIC | Age: 48
End: 2018-01-01
Attending: PSYCHIATRY & NEUROLOGY
Payer: MEDICARE

## 2018-01-01 ENCOUNTER — DOCUMENTATION ONLY (OUTPATIENT)
Dept: CARE COORDINATION | Facility: CLINIC | Age: 48
End: 2018-01-01

## 2018-01-01 ENCOUNTER — HOSPITAL ENCOUNTER (OUTPATIENT)
Facility: CLINIC | Age: 48
Discharge: HOME | End: 2018-10-01
Attending: INTERNAL MEDICINE | Admitting: INTERNAL MEDICINE
Payer: MEDICARE

## 2018-01-01 ENCOUNTER — APPOINTMENT (OUTPATIENT)
Dept: SPEECH THERAPY | Facility: CLINIC | Age: 48
DRG: 441 | End: 2018-01-01
Payer: MEDICARE

## 2018-01-01 ENCOUNTER — APPOINTMENT (OUTPATIENT)
Dept: CT IMAGING | Facility: CLINIC | Age: 48
DRG: 441 | End: 2018-01-01
Attending: INTERNAL MEDICINE
Payer: MEDICARE

## 2018-01-01 ENCOUNTER — TELEPHONE (OUTPATIENT)
Dept: SURGERY | Facility: CLINIC | Age: 48
End: 2018-01-01

## 2018-01-01 ENCOUNTER — APPOINTMENT (OUTPATIENT)
Dept: CT IMAGING | Facility: CLINIC | Age: 48
DRG: 441 | End: 2018-01-01
Attending: EMERGENCY MEDICINE
Payer: MEDICARE

## 2018-01-01 ENCOUNTER — HOSPITAL ENCOUNTER (EMERGENCY)
Facility: CLINIC | Age: 48
Discharge: HOME | End: 2018-10-03
Attending: EMERGENCY MEDICINE | Admitting: EMERGENCY MEDICINE
Payer: MEDICARE

## 2018-01-01 ENCOUNTER — ALLIED HEALTH/NURSE VISIT (OUTPATIENT)
Dept: NEUROLOGY | Facility: CLINIC | Age: 48
DRG: 441 | End: 2018-01-01
Attending: PSYCHIATRY & NEUROLOGY
Payer: MEDICARE

## 2018-01-01 ENCOUNTER — TRANSFERRED RECORDS (OUTPATIENT)
Dept: HEALTH INFORMATION MANAGEMENT | Facility: CLINIC | Age: 48
End: 2018-01-01

## 2018-01-01 ENCOUNTER — RADIANT APPOINTMENT (OUTPATIENT)
Dept: MRI IMAGING | Facility: CLINIC | Age: 48
End: 2018-01-01
Attending: INTERNAL MEDICINE
Payer: MEDICARE

## 2018-01-01 ENCOUNTER — OFFICE VISIT (OUTPATIENT)
Dept: FAMILY MEDICINE | Facility: CLINIC | Age: 48
End: 2018-01-01
Payer: MEDICARE

## 2018-01-01 ENCOUNTER — HOSPITAL ENCOUNTER (INPATIENT)
Facility: CLINIC | Age: 48
LOS: 6 days | Discharge: HOME HEALTH | DRG: 441 | End: 2018-10-24
Attending: EMERGENCY MEDICINE | Admitting: INTERNAL MEDICINE
Payer: MEDICARE

## 2018-01-01 ENCOUNTER — HOME INFUSION (PRE-WILLOW HOME INFUSION) (OUTPATIENT)
Dept: PHARMACY | Facility: CLINIC | Age: 48
End: 2018-01-01

## 2018-01-01 ENCOUNTER — APPOINTMENT (OUTPATIENT)
Dept: GENERAL RADIOLOGY | Facility: CLINIC | Age: 48
DRG: 441 | End: 2018-01-01
Payer: MEDICARE

## 2018-01-01 ENCOUNTER — CARE COORDINATION (OUTPATIENT)
Dept: UROLOGY | Facility: CLINIC | Age: 48
End: 2018-01-01

## 2018-01-01 ENCOUNTER — HOSPITAL ENCOUNTER (INPATIENT)
Facility: CLINIC | Age: 48
LOS: 4 days | Discharge: HOME HEALTH | DRG: 152 | End: 2018-09-18
Attending: EMERGENCY MEDICINE | Admitting: HOSPITALIST
Payer: MEDICARE

## 2018-01-01 ENCOUNTER — APPOINTMENT (OUTPATIENT)
Dept: GENERAL RADIOLOGY | Facility: CLINIC | Age: 48
DRG: 441 | End: 2018-01-01
Attending: EMERGENCY MEDICINE
Payer: MEDICARE

## 2018-01-01 ENCOUNTER — APPOINTMENT (OUTPATIENT)
Dept: GENERAL RADIOLOGY | Facility: CLINIC | Age: 48
End: 2018-01-01
Attending: EMERGENCY MEDICINE
Payer: MEDICARE

## 2018-01-01 ENCOUNTER — TELEPHONE (OUTPATIENT)
Dept: INTERNAL MEDICINE | Facility: CLINIC | Age: 48
End: 2018-01-01

## 2018-01-01 ENCOUNTER — APPOINTMENT (OUTPATIENT)
Dept: ULTRASOUND IMAGING | Facility: CLINIC | Age: 48
DRG: 441 | End: 2018-01-01
Attending: EMERGENCY MEDICINE
Payer: MEDICARE

## 2018-01-01 ENCOUNTER — PATIENT OUTREACH (OUTPATIENT)
Dept: CARE COORDINATION | Facility: CLINIC | Age: 48
End: 2018-01-01

## 2018-01-01 ENCOUNTER — MEDICAL CORRESPONDENCE (OUTPATIENT)
Dept: HEALTH INFORMATION MANAGEMENT | Facility: CLINIC | Age: 48
End: 2018-01-01

## 2018-01-01 ENCOUNTER — OFFICE VISIT (OUTPATIENT)
Dept: GASTROENTEROLOGY | Facility: CLINIC | Age: 48
End: 2018-01-01
Attending: INTERNAL MEDICINE
Payer: MEDICARE

## 2018-01-01 ENCOUNTER — APPOINTMENT (OUTPATIENT)
Dept: ULTRASOUND IMAGING | Facility: CLINIC | Age: 48
DRG: 432 | End: 2018-01-01
Payer: MEDICARE

## 2018-01-01 ENCOUNTER — CARE COORDINATION (OUTPATIENT)
Dept: GASTROENTEROLOGY | Facility: CLINIC | Age: 48
End: 2018-01-01

## 2018-01-01 ENCOUNTER — APPOINTMENT (OUTPATIENT)
Dept: GENERAL RADIOLOGY | Facility: CLINIC | Age: 48
DRG: 432 | End: 2018-01-01
Payer: MEDICARE

## 2018-01-01 ENCOUNTER — APPOINTMENT (OUTPATIENT)
Dept: GENERAL RADIOLOGY | Facility: CLINIC | Age: 48
DRG: 432 | End: 2018-01-01
Attending: EMERGENCY MEDICINE
Payer: MEDICARE

## 2018-01-01 ENCOUNTER — APPOINTMENT (OUTPATIENT)
Dept: OCCUPATIONAL THERAPY | Facility: CLINIC | Age: 48
DRG: 432 | End: 2018-01-01
Payer: MEDICARE

## 2018-01-01 ENCOUNTER — HOSPITAL ENCOUNTER (OUTPATIENT)
Facility: CLINIC | Age: 48
End: 2018-01-01
Attending: INTERNAL MEDICINE | Admitting: INTERNAL MEDICINE
Payer: MEDICARE

## 2018-01-01 ENCOUNTER — OFFICE VISIT (OUTPATIENT)
Dept: NEUROLOGY | Facility: CLINIC | Age: 48
End: 2018-01-01
Payer: MEDICARE

## 2018-01-01 ENCOUNTER — ANESTHESIA (OUTPATIENT)
Dept: SURGERY | Facility: CLINIC | Age: 48
End: 2018-01-01
Payer: MEDICARE

## 2018-01-01 ENCOUNTER — APPOINTMENT (OUTPATIENT)
Dept: PHYSICAL THERAPY | Facility: CLINIC | Age: 48
DRG: 441 | End: 2018-01-01
Attending: PSYCHIATRY & NEUROLOGY
Payer: MEDICARE

## 2018-01-01 ENCOUNTER — ANESTHESIA EVENT (OUTPATIENT)
Dept: SURGERY | Facility: CLINIC | Age: 48
End: 2018-01-01
Payer: MEDICARE

## 2018-01-01 ENCOUNTER — APPOINTMENT (OUTPATIENT)
Dept: ULTRASOUND IMAGING | Facility: CLINIC | Age: 48
End: 2018-01-01
Attending: EMERGENCY MEDICINE
Payer: MEDICARE

## 2018-01-01 ENCOUNTER — RECORDS - HEALTHEAST (OUTPATIENT)
Dept: LAB | Facility: CLINIC | Age: 48
End: 2018-01-01

## 2018-01-01 ENCOUNTER — APPOINTMENT (OUTPATIENT)
Dept: ULTRASOUND IMAGING | Facility: CLINIC | Age: 48
DRG: 377 | End: 2018-01-01
Payer: MEDICARE

## 2018-01-01 ENCOUNTER — HOSPITAL ENCOUNTER (INPATIENT)
Facility: CLINIC | Age: 48
LOS: 16 days | Discharge: NURSING, REHAB OR LONG-TERM CARE | DRG: 441 | End: 2018-12-12
Attending: EMERGENCY MEDICINE | Admitting: INTERNAL MEDICINE
Payer: MEDICARE

## 2018-01-01 ENCOUNTER — APPOINTMENT (OUTPATIENT)
Dept: GENERAL RADIOLOGY | Facility: CLINIC | Age: 48
DRG: 441 | End: 2018-01-01
Attending: INTERNAL MEDICINE
Payer: MEDICARE

## 2018-01-01 ENCOUNTER — APPOINTMENT (OUTPATIENT)
Dept: PHYSICAL THERAPY | Facility: CLINIC | Age: 48
DRG: 441 | End: 2018-01-01
Payer: MEDICARE

## 2018-01-01 ENCOUNTER — HOSPITAL ENCOUNTER (OUTPATIENT)
Facility: CLINIC | Age: 48
Discharge: HOME | End: 2018-05-30
Attending: INTERNAL MEDICINE | Admitting: INTERNAL MEDICINE
Payer: MEDICARE

## 2018-01-01 ENCOUNTER — APPOINTMENT (OUTPATIENT)
Dept: GENERAL RADIOLOGY | Facility: CLINIC | Age: 48
DRG: 152 | End: 2018-01-01
Attending: HOSPITALIST
Payer: MEDICARE

## 2018-01-01 ENCOUNTER — APPOINTMENT (OUTPATIENT)
Dept: PHYSICAL THERAPY | Facility: CLINIC | Age: 48
DRG: 432 | End: 2018-01-01
Payer: MEDICARE

## 2018-01-01 ENCOUNTER — MYC MEDICAL ADVICE (OUTPATIENT)
Dept: FAMILY MEDICINE | Facility: CLINIC | Age: 48
End: 2018-01-01

## 2018-01-01 ENCOUNTER — TELEPHONE (OUTPATIENT)
Dept: PSYCHIATRY | Facility: CLINIC | Age: 48
End: 2018-01-01

## 2018-01-01 ENCOUNTER — APPOINTMENT (OUTPATIENT)
Dept: SPEECH THERAPY | Facility: CLINIC | Age: 48
DRG: 441 | End: 2018-01-01
Attending: INTERNAL MEDICINE
Payer: MEDICARE

## 2018-01-01 ENCOUNTER — APPOINTMENT (OUTPATIENT)
Dept: SPEECH THERAPY | Facility: CLINIC | Age: 48
DRG: 441 | End: 2018-01-01
Attending: PSYCHIATRY & NEUROLOGY
Payer: MEDICARE

## 2018-01-01 ENCOUNTER — APPOINTMENT (OUTPATIENT)
Dept: OCCUPATIONAL THERAPY | Facility: CLINIC | Age: 48
DRG: 377 | End: 2018-01-01
Payer: MEDICARE

## 2018-01-01 ENCOUNTER — MEDICAL CORRESPONDENCE (OUTPATIENT)
Dept: PHARMACY | Facility: CLINIC | Age: 48
End: 2018-01-01

## 2018-01-01 ENCOUNTER — SURGERY (OUTPATIENT)
Age: 48
End: 2018-01-01

## 2018-01-01 ENCOUNTER — PATIENT OUTREACH (OUTPATIENT)
Dept: INTERNAL MEDICINE | Facility: CLINIC | Age: 48
End: 2018-01-01

## 2018-01-01 ENCOUNTER — APPOINTMENT (OUTPATIENT)
Dept: SPEECH THERAPY | Facility: CLINIC | Age: 48
DRG: 432 | End: 2018-01-01
Payer: MEDICARE

## 2018-01-01 ENCOUNTER — APPOINTMENT (OUTPATIENT)
Dept: CT IMAGING | Facility: CLINIC | Age: 48
DRG: 152 | End: 2018-01-01
Attending: EMERGENCY MEDICINE
Payer: MEDICARE

## 2018-01-01 ENCOUNTER — ALLIED HEALTH/NURSE VISIT (OUTPATIENT)
Dept: PHARMACY | Facility: CLINIC | Age: 48
End: 2018-01-01
Attending: HOSPITALIST
Payer: COMMERCIAL

## 2018-01-01 ENCOUNTER — OFFICE VISIT (OUTPATIENT)
Dept: NEUROPSYCHOLOGY | Facility: CLINIC | Age: 48
End: 2018-01-01
Payer: MEDICARE

## 2018-01-01 ENCOUNTER — HOSPITAL ENCOUNTER (INPATIENT)
Facility: CLINIC | Age: 48
LOS: 2 days | Discharge: HOME HEALTH | DRG: 377 | End: 2018-09-06
Attending: EMERGENCY MEDICINE | Admitting: INTERNAL MEDICINE
Payer: MEDICARE

## 2018-01-01 ENCOUNTER — APPOINTMENT (OUTPATIENT)
Dept: MRI IMAGING | Facility: CLINIC | Age: 48
DRG: 441 | End: 2018-01-01
Attending: PSYCHIATRY & NEUROLOGY
Payer: MEDICARE

## 2018-01-01 ENCOUNTER — RESULTS ONLY (OUTPATIENT)
Dept: LAB | Facility: OTHER | Age: 48
End: 2018-01-01

## 2018-01-01 ENCOUNTER — APPOINTMENT (OUTPATIENT)
Dept: OCCUPATIONAL THERAPY | Facility: CLINIC | Age: 48
DRG: 152 | End: 2018-01-01
Attending: HOSPITALIST
Payer: MEDICARE

## 2018-01-01 ENCOUNTER — APPOINTMENT (OUTPATIENT)
Dept: FAMILY MEDICINE | Facility: CLINIC | Age: 48
End: 2018-01-01
Payer: MEDICARE

## 2018-01-01 ENCOUNTER — RADIANT APPOINTMENT (OUTPATIENT)
Dept: GENERAL RADIOLOGY | Facility: CLINIC | Age: 48
End: 2018-01-01
Attending: FAMILY MEDICINE
Payer: MEDICARE

## 2018-01-01 ENCOUNTER — ALLIED HEALTH/NURSE VISIT (OUTPATIENT)
Dept: NEUROLOGY | Facility: CLINIC | Age: 48
DRG: 152 | End: 2018-01-01
Attending: PSYCHIATRY & NEUROLOGY
Payer: MEDICARE

## 2018-01-01 ENCOUNTER — APPOINTMENT (OUTPATIENT)
Dept: NUCLEAR MEDICINE | Facility: CLINIC | Age: 48
DRG: 441 | End: 2018-01-01
Attending: INTERNAL MEDICINE
Payer: MEDICARE

## 2018-01-01 ENCOUNTER — TELEPHONE (OUTPATIENT)
Dept: NEUROLOGY | Facility: CLINIC | Age: 48
End: 2018-01-01

## 2018-01-01 ENCOUNTER — TELEPHONE (OUTPATIENT)
Dept: FAMILY MEDICINE | Facility: CLINIC | Age: 48
End: 2018-01-01

## 2018-01-01 ENCOUNTER — APPOINTMENT (OUTPATIENT)
Dept: PHYSICAL THERAPY | Facility: CLINIC | Age: 48
DRG: 377 | End: 2018-01-01
Payer: MEDICARE

## 2018-01-01 ENCOUNTER — HOSPITAL ENCOUNTER (OUTPATIENT)
Facility: CLINIC | Age: 48
Discharge: HOME | End: 2018-10-15
Attending: INTERNAL MEDICINE | Admitting: INTERNAL MEDICINE
Payer: MEDICARE

## 2018-01-01 ENCOUNTER — APPOINTMENT (OUTPATIENT)
Dept: ULTRASOUND IMAGING | Facility: CLINIC | Age: 48
DRG: 441 | End: 2018-01-01
Attending: INTERNAL MEDICINE
Payer: MEDICARE

## 2018-01-01 ENCOUNTER — HOSPITAL ENCOUNTER (OUTPATIENT)
Facility: CLINIC | Age: 48
Discharge: HOME | End: 2018-11-23
Attending: FAMILY MEDICINE | Admitting: FAMILY MEDICINE
Payer: MEDICARE

## 2018-01-01 ENCOUNTER — APPOINTMENT (OUTPATIENT)
Dept: MRI IMAGING | Facility: CLINIC | Age: 48
DRG: 152 | End: 2018-01-01
Attending: HOSPITALIST
Payer: MEDICARE

## 2018-01-01 ENCOUNTER — ONCOLOGY VISIT (OUTPATIENT)
Dept: ONCOLOGY | Facility: CLINIC | Age: 48
End: 2018-01-01
Attending: INTERNAL MEDICINE
Payer: MEDICARE

## 2018-01-01 ENCOUNTER — TELEPHONE (OUTPATIENT)
Dept: CALL CENTER | Age: 48
End: 2018-01-01

## 2018-01-01 ENCOUNTER — DOCUMENTATION ONLY (OUTPATIENT)
Dept: OTHER | Facility: CLINIC | Age: 48
End: 2018-01-01

## 2018-01-01 ENCOUNTER — TELEPHONE (OUTPATIENT)
Dept: PHARMACY | Facility: OTHER | Age: 48
End: 2018-01-01

## 2018-01-01 ENCOUNTER — HOSPITAL ENCOUNTER (OUTPATIENT)
Facility: CLINIC | Age: 48
Discharge: HOME | End: 2018-07-11
Attending: INTERNAL MEDICINE | Admitting: INTERNAL MEDICINE
Payer: MEDICARE

## 2018-01-01 ENCOUNTER — APPOINTMENT (OUTPATIENT)
Dept: PHYSICAL THERAPY | Facility: CLINIC | Age: 48
DRG: 441 | End: 2018-01-01
Attending: INTERNAL MEDICINE
Payer: MEDICARE

## 2018-01-01 ENCOUNTER — HOSPITAL ENCOUNTER (INPATIENT)
Facility: CLINIC | Age: 48
LOS: 7 days | Discharge: HOME HEALTH | DRG: 432 | End: 2018-11-08
Attending: EMERGENCY MEDICINE | Admitting: INTERNAL MEDICINE
Payer: MEDICARE

## 2018-01-01 ENCOUNTER — APPOINTMENT (OUTPATIENT)
Dept: GENERAL RADIOLOGY | Facility: CLINIC | Age: 48
End: 2018-01-01
Attending: INTERNAL MEDICINE
Payer: MEDICARE

## 2018-01-01 ENCOUNTER — MYC MEDICAL ADVICE (OUTPATIENT)
Dept: INTERNAL MEDICINE | Facility: CLINIC | Age: 48
End: 2018-01-01

## 2018-01-01 VITALS
WEIGHT: 172 LBS | BODY MASS INDEX: 24.62 KG/M2 | RESPIRATION RATE: 18 BRPM | TEMPERATURE: 99.5 F | OXYGEN SATURATION: 99 % | HEIGHT: 70 IN | SYSTOLIC BLOOD PRESSURE: 130 MMHG | HEART RATE: 79 BPM | DIASTOLIC BLOOD PRESSURE: 70 MMHG

## 2018-01-01 VITALS
OXYGEN SATURATION: 97 % | WEIGHT: 194.4 LBS | SYSTOLIC BLOOD PRESSURE: 117 MMHG | RESPIRATION RATE: 20 BRPM | DIASTOLIC BLOOD PRESSURE: 73 MMHG | BODY MASS INDEX: 27.5 KG/M2 | HEART RATE: 64 BPM

## 2018-01-01 VITALS
HEIGHT: 70 IN | HEART RATE: 74 BPM | RESPIRATION RATE: 20 BRPM | DIASTOLIC BLOOD PRESSURE: 75 MMHG | SYSTOLIC BLOOD PRESSURE: 120 MMHG | BODY MASS INDEX: 24.74 KG/M2 | OXYGEN SATURATION: 97 % | WEIGHT: 172.84 LBS | TEMPERATURE: 97.6 F

## 2018-01-01 VITALS
WEIGHT: 191.4 LBS | BODY MASS INDEX: 26.8 KG/M2 | OXYGEN SATURATION: 98 % | HEIGHT: 71 IN | SYSTOLIC BLOOD PRESSURE: 125 MMHG | TEMPERATURE: 98 F | RESPIRATION RATE: 16 BRPM | DIASTOLIC BLOOD PRESSURE: 79 MMHG | HEART RATE: 65 BPM

## 2018-01-01 VITALS
BODY MASS INDEX: 26.31 KG/M2 | OXYGEN SATURATION: 97 % | SYSTOLIC BLOOD PRESSURE: 120 MMHG | HEIGHT: 70 IN | WEIGHT: 183.8 LBS | HEART RATE: 64 BPM | TEMPERATURE: 97.7 F | RESPIRATION RATE: 16 BRPM | DIASTOLIC BLOOD PRESSURE: 79 MMHG

## 2018-01-01 VITALS
WEIGHT: 170.8 LBS | SYSTOLIC BLOOD PRESSURE: 101 MMHG | HEIGHT: 70 IN | BODY MASS INDEX: 24.45 KG/M2 | DIASTOLIC BLOOD PRESSURE: 71 MMHG | HEART RATE: 117 BPM | TEMPERATURE: 97 F | RESPIRATION RATE: 16 BRPM | OXYGEN SATURATION: 98 %

## 2018-01-01 VITALS
BODY MASS INDEX: 27.3 KG/M2 | WEIGHT: 190.7 LBS | HEIGHT: 70 IN | OXYGEN SATURATION: 100 % | SYSTOLIC BLOOD PRESSURE: 127 MMHG | RESPIRATION RATE: 16 BRPM | TEMPERATURE: 97.3 F | DIASTOLIC BLOOD PRESSURE: 87 MMHG

## 2018-01-01 VITALS
TEMPERATURE: 97.3 F | RESPIRATION RATE: 16 BRPM | SYSTOLIC BLOOD PRESSURE: 123 MMHG | HEART RATE: 73 BPM | BODY MASS INDEX: 22.47 KG/M2 | WEIGHT: 194.2 LBS | HEIGHT: 78 IN | OXYGEN SATURATION: 95 % | DIASTOLIC BLOOD PRESSURE: 76 MMHG

## 2018-01-01 VITALS
WEIGHT: 195.33 LBS | TEMPERATURE: 97.7 F | SYSTOLIC BLOOD PRESSURE: 133 MMHG | HEIGHT: 70 IN | HEART RATE: 68 BPM | RESPIRATION RATE: 20 BRPM | OXYGEN SATURATION: 98 % | BODY MASS INDEX: 27.96 KG/M2 | DIASTOLIC BLOOD PRESSURE: 104 MMHG

## 2018-01-01 VITALS
BODY MASS INDEX: 25.82 KG/M2 | HEIGHT: 70 IN | TEMPERATURE: 98 F | WEIGHT: 180.34 LBS | SYSTOLIC BLOOD PRESSURE: 131 MMHG | RESPIRATION RATE: 16 BRPM | DIASTOLIC BLOOD PRESSURE: 88 MMHG | OXYGEN SATURATION: 98 %

## 2018-01-01 VITALS
BODY MASS INDEX: 25.41 KG/M2 | HEART RATE: 59 BPM | WEIGHT: 177.47 LBS | RESPIRATION RATE: 18 BRPM | SYSTOLIC BLOOD PRESSURE: 109 MMHG | OXYGEN SATURATION: 99 % | DIASTOLIC BLOOD PRESSURE: 94 MMHG | TEMPERATURE: 97.2 F | HEIGHT: 70 IN

## 2018-01-01 VITALS
TEMPERATURE: 97.3 F | DIASTOLIC BLOOD PRESSURE: 83 MMHG | OXYGEN SATURATION: 96 % | HEART RATE: 76 BPM | SYSTOLIC BLOOD PRESSURE: 122 MMHG | HEIGHT: 70 IN | BODY MASS INDEX: 26.13 KG/M2 | WEIGHT: 182.5 LBS | RESPIRATION RATE: 18 BRPM

## 2018-01-01 VITALS
SYSTOLIC BLOOD PRESSURE: 120 MMHG | OXYGEN SATURATION: 95 % | WEIGHT: 172 LBS | BODY MASS INDEX: 24.62 KG/M2 | HEIGHT: 70 IN | HEART RATE: 74 BPM | DIASTOLIC BLOOD PRESSURE: 75 MMHG

## 2018-01-01 VITALS
HEART RATE: 77 BPM | OXYGEN SATURATION: 96 % | DIASTOLIC BLOOD PRESSURE: 71 MMHG | BODY MASS INDEX: 22.15 KG/M2 | SYSTOLIC BLOOD PRESSURE: 122 MMHG | WEIGHT: 189.7 LBS | RESPIRATION RATE: 16 BRPM

## 2018-01-01 VITALS
TEMPERATURE: 97.7 F | WEIGHT: 187 LBS | OXYGEN SATURATION: 97 % | SYSTOLIC BLOOD PRESSURE: 125 MMHG | DIASTOLIC BLOOD PRESSURE: 74 MMHG | RESPIRATION RATE: 18 BRPM | BODY MASS INDEX: 26.83 KG/M2

## 2018-01-01 VITALS
SYSTOLIC BLOOD PRESSURE: 127 MMHG | OXYGEN SATURATION: 94 % | HEART RATE: 74 BPM | RESPIRATION RATE: 18 BRPM | DIASTOLIC BLOOD PRESSURE: 88 MMHG | TEMPERATURE: 97.4 F

## 2018-01-01 VITALS
TEMPERATURE: 97.3 F | BODY MASS INDEX: 22.99 KG/M2 | DIASTOLIC BLOOD PRESSURE: 74 MMHG | HEART RATE: 67 BPM | WEIGHT: 160.2 LBS | SYSTOLIC BLOOD PRESSURE: 114 MMHG | RESPIRATION RATE: 16 BRPM | OXYGEN SATURATION: 97 %

## 2018-01-01 VITALS
BODY MASS INDEX: 24.38 KG/M2 | WEIGHT: 170.3 LBS | TEMPERATURE: 96.4 F | SYSTOLIC BLOOD PRESSURE: 101 MMHG | RESPIRATION RATE: 16 BRPM | DIASTOLIC BLOOD PRESSURE: 60 MMHG | HEIGHT: 70 IN | HEART RATE: 60 BPM | OXYGEN SATURATION: 100 %

## 2018-01-01 VITALS — DIASTOLIC BLOOD PRESSURE: 65 MMHG | HEART RATE: 80 BPM | SYSTOLIC BLOOD PRESSURE: 105 MMHG

## 2018-01-01 VITALS
HEART RATE: 104 BPM | SYSTOLIC BLOOD PRESSURE: 137 MMHG | DIASTOLIC BLOOD PRESSURE: 76 MMHG | BODY MASS INDEX: 28.01 KG/M2 | WEIGHT: 195.2 LBS

## 2018-01-01 VITALS — HEART RATE: 79 BPM | DIASTOLIC BLOOD PRESSURE: 72 MMHG | OXYGEN SATURATION: 95 % | SYSTOLIC BLOOD PRESSURE: 109 MMHG

## 2018-01-01 VITALS
SYSTOLIC BLOOD PRESSURE: 111 MMHG | WEIGHT: 175.7 LBS | DIASTOLIC BLOOD PRESSURE: 72 MMHG | HEART RATE: 71 BPM | BODY MASS INDEX: 25.21 KG/M2

## 2018-01-01 DIAGNOSIS — L30.9 DERMATITIS: ICD-10-CM

## 2018-01-01 DIAGNOSIS — K76.82 HEPATIC ENCEPHALOPATHY (H): ICD-10-CM

## 2018-01-01 DIAGNOSIS — R18.8 OTHER ASCITES: ICD-10-CM

## 2018-01-01 DIAGNOSIS — K70.31 ALCOHOLIC CIRRHOSIS OF LIVER WITH ASCITES (H): Primary | Chronic | ICD-10-CM

## 2018-01-01 DIAGNOSIS — F41.9 ANXIETY: ICD-10-CM

## 2018-01-01 DIAGNOSIS — F90.9 ATTENTION DEFICIT HYPERACTIVITY DISORDER (ADHD), UNSPECIFIED ADHD TYPE: ICD-10-CM

## 2018-01-01 DIAGNOSIS — G40.909 SEIZURE DISORDER (H): ICD-10-CM

## 2018-01-01 DIAGNOSIS — G40.209 PARTIAL EPILEPSY WITH IMPAIRMENT OF CONSCIOUSNESS (H): Primary | Chronic | ICD-10-CM

## 2018-01-01 DIAGNOSIS — F32.4 MAJOR DEPRESSIVE DISORDER WITH SINGLE EPISODE, IN PARTIAL REMISSION (H): ICD-10-CM

## 2018-01-01 DIAGNOSIS — K70.31 ALCOHOLIC CIRRHOSIS OF LIVER WITH ASCITES (H): Primary | ICD-10-CM

## 2018-01-01 DIAGNOSIS — C71.3 OLIGOASTROCYTOMA OF PARIETAL LOBE (H): Primary | Chronic | ICD-10-CM

## 2018-01-01 DIAGNOSIS — S80.01XA CONTUSION OF RIGHT KNEE, INITIAL ENCOUNTER: ICD-10-CM

## 2018-01-01 DIAGNOSIS — F32.1 MODERATE MAJOR DEPRESSION (H): Chronic | ICD-10-CM

## 2018-01-01 DIAGNOSIS — E03.9 HYPOTHYROIDISM, UNSPECIFIED TYPE: ICD-10-CM

## 2018-01-01 DIAGNOSIS — F51.01 PRIMARY INSOMNIA: ICD-10-CM

## 2018-01-01 DIAGNOSIS — B96.20 E COLI BACTEREMIA: ICD-10-CM

## 2018-01-01 DIAGNOSIS — F33.0 MILD EPISODE OF RECURRENT MAJOR DEPRESSIVE DISORDER (H): ICD-10-CM

## 2018-01-01 DIAGNOSIS — R29.6 FALLS FREQUENTLY: Primary | ICD-10-CM

## 2018-01-01 DIAGNOSIS — R63.4 LOSS OF WEIGHT: Primary | ICD-10-CM

## 2018-01-01 DIAGNOSIS — E11.9 DM TYPE 2, GOAL HBA1C 7%-8% (H): ICD-10-CM

## 2018-01-01 DIAGNOSIS — C71.9 ASTROCYTOMA (H): ICD-10-CM

## 2018-01-01 DIAGNOSIS — I45.81 LONG Q-T SYNDROME: Primary | ICD-10-CM

## 2018-01-01 DIAGNOSIS — G40.209 PARTIAL SYMPTOMATIC EPILEPSY WITH COMPLEX PARTIAL SEIZURES, NOT INTRACTABLE, WITHOUT STATUS EPILEPTICUS (H): ICD-10-CM

## 2018-01-01 DIAGNOSIS — I85.00 ESOPHAGEAL VARICES (H): Primary | ICD-10-CM

## 2018-01-01 DIAGNOSIS — E72.20 HYPERAMMONEMIA (H): ICD-10-CM

## 2018-01-01 DIAGNOSIS — R63.4 LOSS OF WEIGHT: ICD-10-CM

## 2018-01-01 DIAGNOSIS — L29.9 ITCHING: ICD-10-CM

## 2018-01-01 DIAGNOSIS — K70.31 ALCOHOLIC CIRRHOSIS OF LIVER WITH ASCITES (H): ICD-10-CM

## 2018-01-01 DIAGNOSIS — C71.3 OLIGOASTROCYTOMA OF PARIETAL LOBE (H): ICD-10-CM

## 2018-01-01 DIAGNOSIS — R25.2 MUSCLE CRAMP: ICD-10-CM

## 2018-01-01 DIAGNOSIS — K40.90 RIGHT INGUINAL HERNIA: Primary | ICD-10-CM

## 2018-01-01 DIAGNOSIS — D64.9 ANEMIA, UNSPECIFIED TYPE: Primary | ICD-10-CM

## 2018-01-01 DIAGNOSIS — K70.40 ALCOHOLIC LIVER FAILURE (H): ICD-10-CM

## 2018-01-01 DIAGNOSIS — K74.60 CIRRHOSIS OF LIVER WITH ASCITES, UNSPECIFIED HEPATIC CIRRHOSIS TYPE (H): ICD-10-CM

## 2018-01-01 DIAGNOSIS — K70.40 ALCOHOLIC HEPATIC FAILURE WITHOUT COMA (H): ICD-10-CM

## 2018-01-01 DIAGNOSIS — S60.222A CONTUSION OF LEFT HAND, INITIAL ENCOUNTER: ICD-10-CM

## 2018-01-01 DIAGNOSIS — G40.209 PARTIAL EPILEPSY WITH IMPAIRMENT OF CONSCIOUSNESS (H): Primary | ICD-10-CM

## 2018-01-01 DIAGNOSIS — G89.29 OTHER CHRONIC PAIN: Chronic | ICD-10-CM

## 2018-01-01 DIAGNOSIS — K76.82 HEPATIC ENCEPHALOPATHY (H): Primary | ICD-10-CM

## 2018-01-01 DIAGNOSIS — K70.9 LIVER DISEASE, CHRONIC, DUE TO ALCOHOL (H): ICD-10-CM

## 2018-01-01 DIAGNOSIS — R56.9 SEIZURES (H): Primary | ICD-10-CM

## 2018-01-01 DIAGNOSIS — K70.31 ALCOHOLIC CIRRHOSIS OF LIVER WITH ASCITES (H): Chronic | ICD-10-CM

## 2018-01-01 DIAGNOSIS — R79.1 ELEVATED INR: Primary | ICD-10-CM

## 2018-01-01 DIAGNOSIS — N45.1 EPIDIDYMITIS: ICD-10-CM

## 2018-01-01 DIAGNOSIS — G25.9 MOVEMENT DISORDER: ICD-10-CM

## 2018-01-01 DIAGNOSIS — R78.81 BACTEREMIA: Primary | ICD-10-CM

## 2018-01-01 DIAGNOSIS — T88.7XXA MEDICATION SIDE EFFECTS: ICD-10-CM

## 2018-01-01 DIAGNOSIS — R18.8 CIRRHOSIS OF LIVER WITH ASCITES, UNSPECIFIED HEPATIC CIRRHOSIS TYPE (H): ICD-10-CM

## 2018-01-01 DIAGNOSIS — K92.2 GI BLEED: Primary | ICD-10-CM

## 2018-01-01 DIAGNOSIS — I86.4 VARICES, GASTRIC: ICD-10-CM

## 2018-01-01 DIAGNOSIS — K64.9 RECTAL VARICES: Primary | ICD-10-CM

## 2018-01-01 DIAGNOSIS — Z92.89 HX OF PSYCHIATRIC CARE: ICD-10-CM

## 2018-01-01 DIAGNOSIS — G93.40 ENCEPHALOPATHY: Primary | ICD-10-CM

## 2018-01-01 DIAGNOSIS — K70.30 ALCOHOLIC CIRRHOSIS OF LIVER WITHOUT ASCITES (H): Chronic | ICD-10-CM

## 2018-01-01 DIAGNOSIS — R60.0 LEG EDEMA: Primary | ICD-10-CM

## 2018-01-01 DIAGNOSIS — R78.81 E COLI BACTEREMIA: ICD-10-CM

## 2018-01-01 DIAGNOSIS — Z98.890 S/P FINE NEEDLE ASPIRATION: Primary | ICD-10-CM

## 2018-01-01 DIAGNOSIS — C71.3: Primary | ICD-10-CM

## 2018-01-01 DIAGNOSIS — K92.2 GASTROINTESTINAL HEMORRHAGE, UNSPECIFIED GASTROINTESTINAL HEMORRHAGE TYPE: ICD-10-CM

## 2018-01-01 DIAGNOSIS — C71.3 OLIGOASTROCYTOMA OF PARIETAL LOBE (H): Chronic | ICD-10-CM

## 2018-01-01 DIAGNOSIS — Z91.81 AT HIGH RISK FOR FALLS: ICD-10-CM

## 2018-01-01 DIAGNOSIS — K72.90: ICD-10-CM

## 2018-01-01 DIAGNOSIS — R41.3 MEMORY LOSS: ICD-10-CM

## 2018-01-01 DIAGNOSIS — D49.6 BRAIN TUMOR (H): ICD-10-CM

## 2018-01-01 DIAGNOSIS — Z74.09 LIMITED MOBILITY: ICD-10-CM

## 2018-01-01 DIAGNOSIS — G40.209 PARTIAL SYMPTOMATIC EPILEPSY WITH COMPLEX PARTIAL SEIZURES, NOT INTRACTABLE, WITHOUT STATUS EPILEPTICUS (H): Primary | ICD-10-CM

## 2018-01-01 DIAGNOSIS — E03.9 HYPOTHYROIDISM: ICD-10-CM

## 2018-01-01 DIAGNOSIS — R26.89 IMBALANCE: ICD-10-CM

## 2018-01-01 DIAGNOSIS — R78.81 BACTEREMIA: ICD-10-CM

## 2018-01-01 DIAGNOSIS — E46 PROTEIN-CALORIE MALNUTRITION, UNSPECIFIED SEVERITY (H): ICD-10-CM

## 2018-01-01 DIAGNOSIS — E72.20 HYPERAMMONEMIA (H): Primary | ICD-10-CM

## 2018-01-01 DIAGNOSIS — D64.9 ANEMIA, UNSPECIFIED TYPE: ICD-10-CM

## 2018-01-01 LAB
ABO + RH BLD: NORMAL
AFP SERPL-MCNC: 2.7 UG/L (ref 0–8)
ALBUMIN FLD-MCNC: 0.4 G/DL
ALBUMIN SERPL-MCNC: 2.4 G/DL (ref 3.4–5)
ALBUMIN SERPL-MCNC: 2.5 G/DL (ref 3.4–5)
ALBUMIN SERPL-MCNC: 2.5 G/DL (ref 3.4–5)
ALBUMIN SERPL-MCNC: 2.6 G/DL (ref 3.4–5)
ALBUMIN SERPL-MCNC: 2.7 G/DL (ref 3.4–5)
ALBUMIN SERPL-MCNC: 2.8 G/DL (ref 3.4–5)
ALBUMIN SERPL-MCNC: 2.9 G/DL (ref 3.4–5)
ALBUMIN SERPL-MCNC: 2.9 G/DL (ref 3.4–5)
ALBUMIN SERPL-MCNC: 3 G/DL (ref 3.4–5)
ALBUMIN SERPL-MCNC: 3 G/DL (ref 3.4–5)
ALBUMIN SERPL-MCNC: 3.1 G/DL (ref 3.4–5)
ALBUMIN SERPL-MCNC: 3.2 G/DL (ref 3.4–5)
ALBUMIN SERPL-MCNC: 3.3 G/DL (ref 3.4–5)
ALBUMIN SERPL-MCNC: 3.4 G/DL (ref 3.4–5)
ALBUMIN SERPL-MCNC: 3.5 G/DL (ref 3.4–5)
ALBUMIN SERPL-MCNC: 3.6 G/DL (ref 3.4–5)
ALBUMIN SERPL-MCNC: 3.7 G/DL (ref 3.4–5)
ALBUMIN SERPL-MCNC: 3.8 G/DL (ref 3.4–5)
ALBUMIN SERPL-MCNC: 3.9 G/DL (ref 3.4–5)
ALBUMIN SERPL-MCNC: 4.1 G/DL (ref 3.4–5)
ALBUMIN UR-MCNC: 10 MG/DL
ALBUMIN UR-MCNC: 30 MG/DL
ALBUMIN UR-MCNC: 30 MG/DL
ALBUMIN UR-MCNC: NEGATIVE MG/DL
ALBUMIN UR-MCNC: NEGATIVE MG/DL
ALP SERPL-CCNC: 199 U/L (ref 40–150)
ALP SERPL-CCNC: 200 U/L (ref 40–150)
ALP SERPL-CCNC: 207 U/L (ref 40–150)
ALP SERPL-CCNC: 220 U/L (ref 40–150)
ALP SERPL-CCNC: 241 U/L (ref 40–150)
ALP SERPL-CCNC: 254 U/L (ref 40–150)
ALP SERPL-CCNC: 262 U/L (ref 40–150)
ALP SERPL-CCNC: 263 U/L (ref 40–150)
ALP SERPL-CCNC: 272 U/L (ref 40–150)
ALP SERPL-CCNC: 275 U/L (ref 40–150)
ALP SERPL-CCNC: 277 U/L (ref 40–150)
ALP SERPL-CCNC: 278 U/L (ref 40–150)
ALP SERPL-CCNC: 279 U/L (ref 40–150)
ALP SERPL-CCNC: 279 U/L (ref 40–150)
ALP SERPL-CCNC: 294 U/L (ref 40–150)
ALP SERPL-CCNC: 295 U/L (ref 40–150)
ALP SERPL-CCNC: 301 U/L (ref 40–150)
ALP SERPL-CCNC: 302 U/L (ref 40–150)
ALP SERPL-CCNC: 306 U/L (ref 40–150)
ALP SERPL-CCNC: 311 U/L (ref 40–150)
ALP SERPL-CCNC: 311 U/L (ref 40–150)
ALP SERPL-CCNC: 322 U/L (ref 40–150)
ALP SERPL-CCNC: 323 U/L (ref 40–150)
ALP SERPL-CCNC: 330 U/L (ref 40–150)
ALP SERPL-CCNC: 339 U/L (ref 40–150)
ALP SERPL-CCNC: 339 U/L (ref 40–150)
ALP SERPL-CCNC: 366 U/L (ref 40–150)
ALP SERPL-CCNC: 374 U/L (ref 40–150)
ALP SERPL-CCNC: 385 U/L (ref 40–150)
ALP SERPL-CCNC: 404 U/L (ref 40–150)
ALT SERPL W P-5'-P-CCNC: 21 U/L (ref 0–70)
ALT SERPL W P-5'-P-CCNC: 22 U/L (ref 0–70)
ALT SERPL W P-5'-P-CCNC: 23 U/L (ref 0–70)
ALT SERPL W P-5'-P-CCNC: 25 U/L (ref 0–70)
ALT SERPL W P-5'-P-CCNC: 26 U/L (ref 0–70)
ALT SERPL W P-5'-P-CCNC: 26 U/L (ref 0–70)
ALT SERPL W P-5'-P-CCNC: 27 U/L (ref 0–70)
ALT SERPL W P-5'-P-CCNC: 27 U/L (ref 0–70)
ALT SERPL W P-5'-P-CCNC: 28 U/L (ref 0–70)
ALT SERPL W P-5'-P-CCNC: 28 U/L (ref 0–70)
ALT SERPL W P-5'-P-CCNC: 29 U/L (ref 0–70)
ALT SERPL W P-5'-P-CCNC: 29 U/L (ref 0–70)
ALT SERPL W P-5'-P-CCNC: 30 U/L (ref 0–70)
ALT SERPL W P-5'-P-CCNC: 32 U/L (ref 0–70)
ALT SERPL W P-5'-P-CCNC: 33 U/L (ref 0–70)
ALT SERPL W P-5'-P-CCNC: 33 U/L (ref 0–70)
ALT SERPL W P-5'-P-CCNC: 35 U/L (ref 0–70)
ALT SERPL W P-5'-P-CCNC: 35 U/L (ref 0–70)
ALT SERPL W P-5'-P-CCNC: 36 U/L (ref 0–70)
ALT SERPL W P-5'-P-CCNC: 39 U/L (ref 0–70)
ALT SERPL W P-5'-P-CCNC: 41 U/L (ref 0–70)
ALT SERPL W P-5'-P-CCNC: 45 U/L (ref 0–70)
AMMONIA PLAS-SCNC: 107 UMOL/L (ref 10–50)
AMMONIA PLAS-SCNC: 111 UMOL/L (ref 10–50)
AMMONIA PLAS-SCNC: 131 UMOL/L (ref 10–50)
AMMONIA PLAS-SCNC: 147 UMOL/L (ref 10–50)
AMMONIA PLAS-SCNC: 148 UMOL/L (ref 10–50)
AMMONIA PLAS-SCNC: 46 UMOL/L (ref 10–50)
AMMONIA PLAS-SCNC: 54 UMOL/L (ref 10–50)
AMMONIA PLAS-SCNC: 67 UMOL/L (ref 10–50)
AMMONIA PLAS-SCNC: 84 UMOL/L (ref 10–50)
AMMONIA PLAS-SCNC: 86 UMOL/L (ref 11–35)
ANION GAP SERPL CALCULATED.3IONS-SCNC: 10 MMOL/L (ref 3–14)
ANION GAP SERPL CALCULATED.3IONS-SCNC: 11 MMOL/L (ref 3–14)
ANION GAP SERPL CALCULATED.3IONS-SCNC: 11 MMOL/L (ref 3–14)
ANION GAP SERPL CALCULATED.3IONS-SCNC: 12 MMOL/L (ref 3–14)
ANION GAP SERPL CALCULATED.3IONS-SCNC: 12 MMOL/L (ref 3–14)
ANION GAP SERPL CALCULATED.3IONS-SCNC: 14 MMOL/L (ref 3–14)
ANION GAP SERPL CALCULATED.3IONS-SCNC: 6 MMOL/L (ref 3–14)
ANION GAP SERPL CALCULATED.3IONS-SCNC: 7 MMOL/L (ref 3–14)
ANION GAP SERPL CALCULATED.3IONS-SCNC: 7 MMOL/L (ref 5–18)
ANION GAP SERPL CALCULATED.3IONS-SCNC: 8 MMOL/L (ref 3–14)
ANION GAP SERPL CALCULATED.3IONS-SCNC: 9 MMOL/L (ref 3–14)
ANISOCYTOSIS BLD QL SMEAR: SLIGHT
APPEARANCE FLD: CLEAR
APPEARANCE FLD: NORMAL
APPEARANCE UR: ABNORMAL
APPEARANCE UR: CLEAR
APTT PPP: 32 SEC (ref 22–37)
APTT PPP: 33 SEC (ref 22–37)
APTT PPP: 42 SEC (ref 22–37)
AST SERPL W P-5'-P-CCNC: 30 U/L (ref 0–45)
AST SERPL W P-5'-P-CCNC: 31 U/L (ref 0–45)
AST SERPL W P-5'-P-CCNC: 33 U/L (ref 0–45)
AST SERPL W P-5'-P-CCNC: 35 U/L (ref 0–45)
AST SERPL W P-5'-P-CCNC: 35 U/L (ref 0–45)
AST SERPL W P-5'-P-CCNC: 36 U/L (ref 0–45)
AST SERPL W P-5'-P-CCNC: 36 U/L (ref 0–45)
AST SERPL W P-5'-P-CCNC: 37 U/L (ref 0–45)
AST SERPL W P-5'-P-CCNC: 37 U/L (ref 0–45)
AST SERPL W P-5'-P-CCNC: 38 U/L (ref 0–45)
AST SERPL W P-5'-P-CCNC: 38 U/L (ref 0–45)
AST SERPL W P-5'-P-CCNC: 39 U/L (ref 0–45)
AST SERPL W P-5'-P-CCNC: 40 U/L (ref 0–45)
AST SERPL W P-5'-P-CCNC: 41 U/L (ref 0–45)
AST SERPL W P-5'-P-CCNC: 44 U/L (ref 0–45)
AST SERPL W P-5'-P-CCNC: 44 U/L (ref 0–45)
AST SERPL W P-5'-P-CCNC: 48 U/L (ref 0–45)
AST SERPL W P-5'-P-CCNC: 49 U/L (ref 0–45)
AST SERPL W P-5'-P-CCNC: 52 U/L (ref 0–45)
AST SERPL W P-5'-P-CCNC: 52 U/L (ref 0–45)
AST SERPL W P-5'-P-CCNC: 60 U/L (ref 0–45)
AST SERPL W P-5'-P-CCNC: 61 U/L (ref 0–45)
B-HCG FREE SERPL-ACNC: 1.2 [IU]/L (ref 0.86–1.14)
BACTERIA SPEC CULT: ABNORMAL
BACTERIA SPEC CULT: NO GROWTH
BASE DEFICIT BLDA-SCNC: 2.8 MMOL/L
BASOPHILS # BLD AUTO: 0 10E9/L (ref 0–0.2)
BASOPHILS NFR BLD AUTO: 0.4 %
BASOPHILS NFR BLD AUTO: 0.4 %
BASOPHILS NFR BLD AUTO: 0.5 %
BASOPHILS NFR BLD AUTO: 0.6 %
BASOPHILS NFR BLD AUTO: 0.6 %
BASOPHILS NFR BLD AUTO: 0.7 %
BASOPHILS NFR BLD AUTO: 0.7 %
BASOPHILS NFR BLD AUTO: 0.9 %
BASOPHILS NFR BLD AUTO: 1.1 %
BASOPHILS NFR BLD AUTO: 1.1 %
BASOPHILS NFR FLD MANUAL: 1 %
BILIRUB DIRECT SERPL-MCNC: 0.6 MG/DL (ref 0–0.2)
BILIRUB DIRECT SERPL-MCNC: 0.7 MG/DL (ref 0–0.2)
BILIRUB DIRECT SERPL-MCNC: 0.7 MG/DL (ref 0–0.2)
BILIRUB DIRECT SERPL-MCNC: 0.9 MG/DL (ref 0–0.2)
BILIRUB DIRECT SERPL-MCNC: 1.3 MG/DL (ref 0–0.2)
BILIRUB SERPL-MCNC: 1.1 MG/DL (ref 0.2–1.3)
BILIRUB SERPL-MCNC: 1.2 MG/DL (ref 0.2–1.3)
BILIRUB SERPL-MCNC: 1.2 MG/DL (ref 0.2–1.3)
BILIRUB SERPL-MCNC: 1.3 MG/DL (ref 0.2–1.3)
BILIRUB SERPL-MCNC: 1.3 MG/DL (ref 0.2–1.3)
BILIRUB SERPL-MCNC: 1.4 MG/DL (ref 0.2–1.3)
BILIRUB SERPL-MCNC: 1.5 MG/DL (ref 0.2–1.3)
BILIRUB SERPL-MCNC: 1.5 MG/DL (ref 0.2–1.3)
BILIRUB SERPL-MCNC: 1.6 MG/DL (ref 0.2–1.3)
BILIRUB SERPL-MCNC: 1.7 MG/DL (ref 0.2–1.3)
BILIRUB SERPL-MCNC: 1.9 MG/DL (ref 0.2–1.3)
BILIRUB SERPL-MCNC: 2 MG/DL (ref 0.2–1.3)
BILIRUB SERPL-MCNC: 2.4 MG/DL (ref 0.2–1.3)
BILIRUB SERPL-MCNC: 3.9 MG/DL (ref 0.2–1.3)
BILIRUB UR QL STRIP: ABNORMAL
BILIRUB UR QL STRIP: NEGATIVE
BLD GP AB SCN SERPL QL: NORMAL
BLD PROD TYP BPU: NORMAL
BLD UNIT ID BPU: 0
BLD UNIT ID BPU: 0
BLOOD BANK CMNT PATIENT-IMP: NORMAL
BLOOD PRODUCT CODE: NORMAL
BLOOD PRODUCT CODE: NORMAL
BPU ID: NORMAL
BPU ID: NORMAL
BUN SERPL-MCNC: 10 MG/DL (ref 7–30)
BUN SERPL-MCNC: 11 MG/DL (ref 7–30)
BUN SERPL-MCNC: 12 MG/DL (ref 7–30)
BUN SERPL-MCNC: 12 MG/DL (ref 8–22)
BUN SERPL-MCNC: 13 MG/DL (ref 7–30)
BUN SERPL-MCNC: 13 MG/DL (ref 7–30)
BUN SERPL-MCNC: 15 MG/DL (ref 7–30)
BUN SERPL-MCNC: 16 MG/DL (ref 7–30)
BUN SERPL-MCNC: 17 MG/DL (ref 7–30)
BUN SERPL-MCNC: 18 MG/DL (ref 7–30)
BUN SERPL-MCNC: 19 MG/DL (ref 7–30)
BUN SERPL-MCNC: 19 MG/DL (ref 7–30)
BUN SERPL-MCNC: 21 MG/DL (ref 7–30)
BUN SERPL-MCNC: 23 MG/DL (ref 7–30)
BUN SERPL-MCNC: 24 MG/DL (ref 7–30)
BUN SERPL-MCNC: 25 MG/DL (ref 7–30)
BUN SERPL-MCNC: 25 MG/DL (ref 7–30)
BUN SERPL-MCNC: 26 MG/DL (ref 7–30)
BUN SERPL-MCNC: 26 MG/DL (ref 7–30)
BUN SERPL-MCNC: 30 MG/DL (ref 7–30)
BUN SERPL-MCNC: 8 MG/DL (ref 7–30)
BURR CELLS BLD QL SMEAR: SLIGHT
C DIFF TOX B STL QL: NEGATIVE
CA-I SERPL ISE-MCNC: 4.5 MG/DL (ref 4.4–5.2)
CALCIUM SERPL-MCNC: 7.5 MG/DL (ref 8.5–10.1)
CALCIUM SERPL-MCNC: 7.6 MG/DL (ref 8.5–10.1)
CALCIUM SERPL-MCNC: 7.7 MG/DL (ref 8.5–10.1)
CALCIUM SERPL-MCNC: 7.7 MG/DL (ref 8.5–10.1)
CALCIUM SERPL-MCNC: 7.8 MG/DL (ref 8.5–10.1)
CALCIUM SERPL-MCNC: 7.9 MG/DL (ref 8.5–10.1)
CALCIUM SERPL-MCNC: 8 MG/DL (ref 8.5–10.1)
CALCIUM SERPL-MCNC: 8.1 MG/DL (ref 8.5–10.1)
CALCIUM SERPL-MCNC: 8.2 MG/DL (ref 8.5–10.1)
CALCIUM SERPL-MCNC: 8.3 MG/DL (ref 8.5–10.1)
CALCIUM SERPL-MCNC: 8.4 MG/DL (ref 8.5–10.1)
CALCIUM SERPL-MCNC: 8.5 MG/DL (ref 8.5–10.1)
CALCIUM SERPL-MCNC: 8.6 MG/DL (ref 8.5–10.1)
CALCIUM SERPL-MCNC: 8.6 MG/DL (ref 8.5–10.5)
CALCIUM SERPL-MCNC: 8.7 MG/DL (ref 8.5–10.1)
CALCIUM SERPL-MCNC: 8.8 MG/DL (ref 8.5–10.1)
CALCIUM SERPL-MCNC: 8.8 MG/DL (ref 8.5–10.1)
CALCIUM SERPL-MCNC: 8.9 MG/DL (ref 8.5–10.1)
CALCIUM SERPL-MCNC: 9 MG/DL (ref 8.5–10.1)
CAOX CRY #/AREA URNS HPF: ABNORMAL /HPF
CAOX CRY #/AREA URNS HPF: ABNORMAL /HPF
CARBAMAZEPINE SERPL-MCNC: 8.7 MG/L (ref 4–12)
CHLORIDE BLD-SCNC: 110 MMOL/L (ref 98–107)
CHLORIDE SERPL-SCNC: 101 MMOL/L (ref 94–109)
CHLORIDE SERPL-SCNC: 102 MMOL/L (ref 94–109)
CHLORIDE SERPL-SCNC: 103 MMOL/L (ref 94–109)
CHLORIDE SERPL-SCNC: 104 MMOL/L (ref 94–109)
CHLORIDE SERPL-SCNC: 104 MMOL/L (ref 94–109)
CHLORIDE SERPL-SCNC: 105 MMOL/L (ref 94–109)
CHLORIDE SERPL-SCNC: 106 MMOL/L (ref 94–109)
CHLORIDE SERPL-SCNC: 107 MMOL/L (ref 94–109)
CHLORIDE SERPL-SCNC: 108 MMOL/L (ref 94–109)
CHLORIDE SERPL-SCNC: 109 MMOL/L (ref 94–109)
CHLORIDE SERPL-SCNC: 110 MMOL/L (ref 94–109)
CHLORIDE SERPL-SCNC: 111 MMOL/L (ref 94–109)
CHLORIDE SERPL-SCNC: 112 MMOL/L (ref 94–109)
CHLORIDE SERPL-SCNC: 112 MMOL/L (ref 94–109)
CHLORIDE SERPL-SCNC: 113 MMOL/L (ref 94–109)
CHLORIDE SERPL-SCNC: 114 MMOL/L (ref 94–109)
CHLORIDE SERPL-SCNC: 117 MMOL/L (ref 94–109)
CHLORIDE SERPL-SCNC: 99 MMOL/L (ref 94–109)
CK SERPL-CCNC: 40 U/L (ref 30–300)
CO2 BLDCOV-SCNC: 18 MMOL/L (ref 21–28)
CO2 BLDCOV-SCNC: 19 MMOL/L (ref 21–28)
CO2 SERPL-SCNC: 18 MMOL/L (ref 20–32)
CO2 SERPL-SCNC: 19 MMOL/L (ref 20–32)
CO2 SERPL-SCNC: 20 MMOL/L (ref 20–32)
CO2 SERPL-SCNC: 20 MMOL/L (ref 22–31)
CO2 SERPL-SCNC: 21 MMOL/L (ref 20–32)
CO2 SERPL-SCNC: 22 MMOL/L (ref 20–32)
CO2 SERPL-SCNC: 23 MMOL/L (ref 20–32)
CO2 SERPL-SCNC: 24 MMOL/L (ref 20–32)
CO2 SERPL-SCNC: 25 MMOL/L (ref 20–32)
CO2 SERPL-SCNC: 26 MMOL/L (ref 20–32)
CO2 SERPL-SCNC: 27 MMOL/L (ref 20–32)
CO2 SERPL-SCNC: 28 MMOL/L (ref 20–32)
COLOR FLD: YELLOW
COLOR UR AUTO: ABNORMAL
COLOR UR AUTO: YELLOW
COPATH REPORT: NORMAL
COPPER SERPL-MCNC: 106 UG/DL (ref 70–140)
CREAT BLD-MCNC: 0.9 MG/DL (ref 0.66–1.25)
CREAT SERPL-MCNC: 0.78 MG/DL (ref 0.66–1.25)
CREAT SERPL-MCNC: 0.8 MG/DL (ref 0.66–1.25)
CREAT SERPL-MCNC: 0.8 MG/DL (ref 0.66–1.25)
CREAT SERPL-MCNC: 0.82 MG/DL (ref 0.66–1.25)
CREAT SERPL-MCNC: 0.84 MG/DL (ref 0.66–1.25)
CREAT SERPL-MCNC: 0.85 MG/DL (ref 0.66–1.25)
CREAT SERPL-MCNC: 0.86 MG/DL (ref 0.66–1.25)
CREAT SERPL-MCNC: 0.87 MG/DL (ref 0.66–1.25)
CREAT SERPL-MCNC: 0.88 MG/DL (ref 0.66–1.25)
CREAT SERPL-MCNC: 0.89 MG/DL (ref 0.66–1.25)
CREAT SERPL-MCNC: 0.9 MG/DL (ref 0.66–1.25)
CREAT SERPL-MCNC: 0.9 MG/DL (ref 0.66–1.25)
CREAT SERPL-MCNC: 0.9 MG/DL (ref 0.7–1.3)
CREAT SERPL-MCNC: 0.91 MG/DL (ref 0.66–1.25)
CREAT SERPL-MCNC: 0.92 MG/DL (ref 0.66–1.25)
CREAT SERPL-MCNC: 0.93 MG/DL (ref 0.66–1.25)
CREAT SERPL-MCNC: 0.93 MG/DL (ref 0.66–1.25)
CREAT SERPL-MCNC: 0.94 MG/DL (ref 0.66–1.25)
CREAT SERPL-MCNC: 0.95 MG/DL (ref 0.66–1.25)
CREAT SERPL-MCNC: 0.95 MG/DL (ref 0.66–1.25)
CREAT SERPL-MCNC: 0.96 MG/DL (ref 0.66–1.25)
CREAT SERPL-MCNC: 0.96 MG/DL (ref 0.66–1.25)
CREAT SERPL-MCNC: 0.98 MG/DL (ref 0.66–1.25)
CREAT SERPL-MCNC: 1 MG/DL (ref 0.66–1.25)
CREAT SERPL-MCNC: 1.01 MG/DL (ref 0.66–1.25)
CREAT SERPL-MCNC: 1.03 MG/DL (ref 0.66–1.25)
CREAT SERPL-MCNC: 1.04 MG/DL (ref 0.66–1.25)
CREAT SERPL-MCNC: 1.04 MG/DL (ref 0.66–1.25)
CREAT SERPL-MCNC: 1.05 MG/DL (ref 0.66–1.25)
CREAT SERPL-MCNC: 1.05 MG/DL (ref 0.66–1.25)
CREAT SERPL-MCNC: 1.11 MG/DL (ref 0.66–1.25)
CREAT SERPL-MCNC: 1.12 MG/DL (ref 0.66–1.25)
CREAT SERPL-MCNC: 1.12 MG/DL (ref 0.66–1.25)
CREAT SERPL-MCNC: 1.13 MG/DL (ref 0.66–1.25)
CREAT SERPL-MCNC: 1.14 MG/DL (ref 0.66–1.25)
CREAT SERPL-MCNC: 1.16 MG/DL (ref 0.66–1.25)
CREAT SERPL-MCNC: 1.19 MG/DL (ref 0.66–1.25)
CREAT SERPL-MCNC: 1.33 MG/DL (ref 0.66–1.25)
CREAT SERPL-MCNC: 1.38 MG/DL (ref 0.66–1.25)
CREAT SERPL-MCNC: 1.39 MG/DL (ref 0.66–1.25)
CREAT SERPL-MCNC: 1.48 MG/DL (ref 0.66–1.25)
CREAT SERPL-MCNC: 1.62 MG/DL (ref 0.66–1.25)
CREAT UR-MCNC: 110 MG/DL
CREAT UR-MCNC: 326 MG/DL
CRP SERPL-MCNC: 17 MG/L (ref 0–8)
DIFFERENTIAL METHOD BLD: ABNORMAL
EOSINOPHIL # BLD AUTO: 0.1 10E9/L (ref 0–0.7)
EOSINOPHIL # BLD AUTO: 0.2 10E9/L (ref 0–0.7)
EOSINOPHIL NFR BLD AUTO: 11.2 %
EOSINOPHIL NFR BLD AUTO: 4.8 %
EOSINOPHIL NFR BLD AUTO: 4.9 %
EOSINOPHIL NFR BLD AUTO: 4.9 %
EOSINOPHIL NFR BLD AUTO: 5.6 %
EOSINOPHIL NFR BLD AUTO: 6.2 %
EOSINOPHIL NFR BLD AUTO: 6.6 %
EOSINOPHIL NFR BLD AUTO: 6.6 %
EOSINOPHIL NFR BLD AUTO: 7 %
EOSINOPHIL NFR BLD AUTO: 7.1 %
EOSINOPHIL NFR BLD AUTO: 7.4 %
EOSINOPHIL NFR BLD AUTO: 7.6 %
EOSINOPHIL NFR BLD AUTO: 8.2 %
EOSINOPHIL NFR BLD AUTO: 8.2 %
EOSINOPHIL NFR FLD MANUAL: 1 %
ERYTHROCYTE [DISTWIDTH] IN BLOOD BY AUTOMATED COUNT: 14.2 % (ref 10–15)
ERYTHROCYTE [DISTWIDTH] IN BLOOD BY AUTOMATED COUNT: 14.4 % (ref 10–15)
ERYTHROCYTE [DISTWIDTH] IN BLOOD BY AUTOMATED COUNT: 14.5 % (ref 10–15)
ERYTHROCYTE [DISTWIDTH] IN BLOOD BY AUTOMATED COUNT: 14.5 % (ref 10–15)
ERYTHROCYTE [DISTWIDTH] IN BLOOD BY AUTOMATED COUNT: 14.6 % (ref 10–15)
ERYTHROCYTE [DISTWIDTH] IN BLOOD BY AUTOMATED COUNT: 14.7 % (ref 10–15)
ERYTHROCYTE [DISTWIDTH] IN BLOOD BY AUTOMATED COUNT: 14.8 % (ref 10–15)
ERYTHROCYTE [DISTWIDTH] IN BLOOD BY AUTOMATED COUNT: 14.9 % (ref 10–15)
ERYTHROCYTE [DISTWIDTH] IN BLOOD BY AUTOMATED COUNT: 15 % (ref 10–15)
ERYTHROCYTE [DISTWIDTH] IN BLOOD BY AUTOMATED COUNT: 15 % (ref 10–15)
ERYTHROCYTE [DISTWIDTH] IN BLOOD BY AUTOMATED COUNT: 15.1 % (ref 10–15)
ERYTHROCYTE [DISTWIDTH] IN BLOOD BY AUTOMATED COUNT: 15.2 % (ref 10–15)
ERYTHROCYTE [DISTWIDTH] IN BLOOD BY AUTOMATED COUNT: 15.3 % (ref 10–15)
ERYTHROCYTE [DISTWIDTH] IN BLOOD BY AUTOMATED COUNT: 15.3 % (ref 10–15)
ERYTHROCYTE [DISTWIDTH] IN BLOOD BY AUTOMATED COUNT: 15.4 % (ref 10–15)
ERYTHROCYTE [DISTWIDTH] IN BLOOD BY AUTOMATED COUNT: 15.5 % (ref 10–15)
ERYTHROCYTE [DISTWIDTH] IN BLOOD BY AUTOMATED COUNT: 15.5 % (ref 10–15)
ERYTHROCYTE [DISTWIDTH] IN BLOOD BY AUTOMATED COUNT: 15.6 % (ref 10–15)
ERYTHROCYTE [DISTWIDTH] IN BLOOD BY AUTOMATED COUNT: 15.7 % (ref 10–15)
ERYTHROCYTE [DISTWIDTH] IN BLOOD BY AUTOMATED COUNT: 15.7 % (ref 10–15)
ERYTHROCYTE [DISTWIDTH] IN BLOOD BY AUTOMATED COUNT: 15.8 % (ref 10–15)
FERRITIN SERPL-MCNC: 102 NG/ML (ref 26–388)
FLEXIBLE SIGMOIDOSCOPY: NORMAL
FOLATE SERPL-MCNC: 31.9 NG/ML
GFR SERPL CREATININE-BSD FRML MDRD: 45 ML/MIN/1.7M2
GFR SERPL CREATININE-BSD FRML MDRD: 50 ML/MIN/1.7M2
GFR SERPL CREATININE-BSD FRML MDRD: 54 ML/MIN/1.7M2
GFR SERPL CREATININE-BSD FRML MDRD: 55 ML/MIN/1.7M2
GFR SERPL CREATININE-BSD FRML MDRD: 57 ML/MIN/1.7M2
GFR SERPL CREATININE-BSD FRML MDRD: 65 ML/MIN/1.7M2
GFR SERPL CREATININE-BSD FRML MDRD: 67 ML/MIN/1.7M2
GFR SERPL CREATININE-BSD FRML MDRD: 68 ML/MIN/1.7M2
GFR SERPL CREATININE-BSD FRML MDRD: 69 ML/MIN/1.7M2
GFR SERPL CREATININE-BSD FRML MDRD: 70 ML/MIN/1.7M2
GFR SERPL CREATININE-BSD FRML MDRD: 75 ML/MIN/1.7M2
GFR SERPL CREATININE-BSD FRML MDRD: 75 ML/MIN/1.7M2
GFR SERPL CREATININE-BSD FRML MDRD: 76 ML/MIN/1.7M2
GFR SERPL CREATININE-BSD FRML MDRD: 78 ML/MIN/1.7M2
GFR SERPL CREATININE-BSD FRML MDRD: 80 ML/MIN/1.7M2
GFR SERPL CREATININE-BSD FRML MDRD: 81 ML/MIN/1.7M2
GFR SERPL CREATININE-BSD FRML MDRD: 83 ML/MIN/1.7M2
GFR SERPL CREATININE-BSD FRML MDRD: 83 ML/MIN/1.7M2
GFR SERPL CREATININE-BSD FRML MDRD: 84 ML/MIN/1.7M2
GFR SERPL CREATININE-BSD FRML MDRD: 84 ML/MIN/1.7M2
GFR SERPL CREATININE-BSD FRML MDRD: 85 ML/MIN/1.7M2
GFR SERPL CREATININE-BSD FRML MDRD: 86 ML/MIN/1.7M2
GFR SERPL CREATININE-BSD FRML MDRD: 86 ML/MIN/1.7M2
GFR SERPL CREATININE-BSD FRML MDRD: 87 ML/MIN/1.7M2
GFR SERPL CREATININE-BSD FRML MDRD: 87 ML/MIN/1.7M2
GFR SERPL CREATININE-BSD FRML MDRD: 88 ML/MIN/1.7M2
GFR SERPL CREATININE-BSD FRML MDRD: 88 ML/MIN/1.7M2
GFR SERPL CREATININE-BSD FRML MDRD: 89 ML/MIN/1.7M2
GFR SERPL CREATININE-BSD FRML MDRD: 90 ML/MIN/1.7M2
GFR SERPL CREATININE-BSD FRML MDRD: 90 ML/MIN/1.7M2
GFR SERPL CREATININE-BSD FRML MDRD: >60 ML/MIN/1.73M2
GFR SERPL CREATININE-BSD FRML MDRD: >90 ML/MIN/1.7M2
GLUCOSE BLD-MCNC: 99 MG/DL (ref 70–125)
GLUCOSE BLDC GLUCOMTR-MCNC: 100 MG/DL (ref 70–99)
GLUCOSE BLDC GLUCOMTR-MCNC: 100 MG/DL (ref 70–99)
GLUCOSE BLDC GLUCOMTR-MCNC: 107 MG/DL (ref 70–99)
GLUCOSE BLDC GLUCOMTR-MCNC: 109 MG/DL (ref 70–99)
GLUCOSE BLDC GLUCOMTR-MCNC: 122 MG/DL (ref 70–99)
GLUCOSE BLDC GLUCOMTR-MCNC: 129 MG/DL (ref 70–99)
GLUCOSE BLDC GLUCOMTR-MCNC: 136 MG/DL (ref 70–99)
GLUCOSE BLDC GLUCOMTR-MCNC: 141 MG/DL (ref 70–99)
GLUCOSE BLDC GLUCOMTR-MCNC: 142 MG/DL (ref 70–99)
GLUCOSE BLDC GLUCOMTR-MCNC: 145 MG/DL (ref 70–99)
GLUCOSE BLDC GLUCOMTR-MCNC: 145 MG/DL (ref 70–99)
GLUCOSE BLDC GLUCOMTR-MCNC: 150 MG/DL (ref 70–99)
GLUCOSE BLDC GLUCOMTR-MCNC: 158 MG/DL (ref 70–99)
GLUCOSE BLDC GLUCOMTR-MCNC: 172 MG/DL (ref 70–99)
GLUCOSE BLDC GLUCOMTR-MCNC: 177 MG/DL (ref 70–99)
GLUCOSE BLDC GLUCOMTR-MCNC: 184 MG/DL (ref 70–99)
GLUCOSE BLDC GLUCOMTR-MCNC: 188 MG/DL (ref 70–99)
GLUCOSE BLDC GLUCOMTR-MCNC: 211 MG/DL (ref 70–99)
GLUCOSE BLDC GLUCOMTR-MCNC: 79 MG/DL (ref 70–99)
GLUCOSE BLDC GLUCOMTR-MCNC: 84 MG/DL (ref 70–99)
GLUCOSE BLDC GLUCOMTR-MCNC: 97 MG/DL (ref 70–99)
GLUCOSE FLD-MCNC: 186 MG/DL
GLUCOSE SERPL-MCNC: 100 MG/DL (ref 70–99)
GLUCOSE SERPL-MCNC: 102 MG/DL (ref 70–99)
GLUCOSE SERPL-MCNC: 103 MG/DL (ref 70–99)
GLUCOSE SERPL-MCNC: 104 MG/DL (ref 70–99)
GLUCOSE SERPL-MCNC: 106 MG/DL (ref 70–99)
GLUCOSE SERPL-MCNC: 108 MG/DL (ref 70–99)
GLUCOSE SERPL-MCNC: 110 MG/DL (ref 70–99)
GLUCOSE SERPL-MCNC: 110 MG/DL (ref 70–99)
GLUCOSE SERPL-MCNC: 111 MG/DL (ref 70–99)
GLUCOSE SERPL-MCNC: 111 MG/DL (ref 70–99)
GLUCOSE SERPL-MCNC: 112 MG/DL (ref 70–99)
GLUCOSE SERPL-MCNC: 113 MG/DL (ref 70–99)
GLUCOSE SERPL-MCNC: 114 MG/DL (ref 70–99)
GLUCOSE SERPL-MCNC: 119 MG/DL (ref 70–99)
GLUCOSE SERPL-MCNC: 127 MG/DL (ref 70–99)
GLUCOSE SERPL-MCNC: 127 MG/DL (ref 70–99)
GLUCOSE SERPL-MCNC: 131 MG/DL (ref 70–99)
GLUCOSE SERPL-MCNC: 131 MG/DL (ref 70–99)
GLUCOSE SERPL-MCNC: 133 MG/DL (ref 70–99)
GLUCOSE SERPL-MCNC: 137 MG/DL (ref 70–99)
GLUCOSE SERPL-MCNC: 141 MG/DL (ref 70–99)
GLUCOSE SERPL-MCNC: 148 MG/DL (ref 70–99)
GLUCOSE SERPL-MCNC: 156 MG/DL (ref 70–99)
GLUCOSE SERPL-MCNC: 159 MG/DL (ref 70–99)
GLUCOSE SERPL-MCNC: 160 MG/DL (ref 70–99)
GLUCOSE SERPL-MCNC: 168 MG/DL (ref 70–99)
GLUCOSE SERPL-MCNC: 171 MG/DL (ref 70–99)
GLUCOSE SERPL-MCNC: 173 MG/DL (ref 70–99)
GLUCOSE SERPL-MCNC: 179 MG/DL (ref 70–99)
GLUCOSE SERPL-MCNC: 196 MG/DL (ref 70–99)
GLUCOSE SERPL-MCNC: 283 MG/DL (ref 70–99)
GLUCOSE SERPL-MCNC: 81 MG/DL (ref 70–99)
GLUCOSE SERPL-MCNC: 84 MG/DL (ref 70–99)
GLUCOSE SERPL-MCNC: 84 MG/DL (ref 70–99)
GLUCOSE SERPL-MCNC: 85 MG/DL (ref 70–99)
GLUCOSE SERPL-MCNC: 90 MG/DL (ref 70–99)
GLUCOSE SERPL-MCNC: 91 MG/DL (ref 70–99)
GLUCOSE SERPL-MCNC: 95 MG/DL (ref 70–99)
GLUCOSE SERPL-MCNC: 96 MG/DL (ref 70–99)
GLUCOSE SERPL-MCNC: 99 MG/DL (ref 70–99)
GLUCOSE UR STRIP-MCNC: 30 MG/DL
GLUCOSE UR STRIP-MCNC: NEGATIVE MG/DL
GRAM STN SPEC: NORMAL
HCO3 BLD-SCNC: 20 MMOL/L (ref 21–28)
HCT VFR BLD AUTO: 28.6 % (ref 40–53)
HCT VFR BLD AUTO: 29.2 % (ref 40–53)
HCT VFR BLD AUTO: 29.4 % (ref 40–53)
HCT VFR BLD AUTO: 29.8 % (ref 40–53)
HCT VFR BLD AUTO: 30 % (ref 40–53)
HCT VFR BLD AUTO: 30.2 % (ref 40–53)
HCT VFR BLD AUTO: 30.3 % (ref 40–53)
HCT VFR BLD AUTO: 30.4 % (ref 40–53)
HCT VFR BLD AUTO: 30.4 % (ref 40–53)
HCT VFR BLD AUTO: 30.9 % (ref 40–53)
HCT VFR BLD AUTO: 30.9 % (ref 40–53)
HCT VFR BLD AUTO: 31 % (ref 40–53)
HCT VFR BLD AUTO: 31.2 % (ref 40–53)
HCT VFR BLD AUTO: 31.4 % (ref 40–53)
HCT VFR BLD AUTO: 31.4 % (ref 40–53)
HCT VFR BLD AUTO: 32.1 % (ref 40–53)
HCT VFR BLD AUTO: 32.4 % (ref 40–53)
HCT VFR BLD AUTO: 32.5 % (ref 40–53)
HCT VFR BLD AUTO: 32.5 % (ref 40–53)
HCT VFR BLD AUTO: 32.6 % (ref 40–53)
HCT VFR BLD AUTO: 32.6 % (ref 40–53)
HCT VFR BLD AUTO: 33 % (ref 40–53)
HCT VFR BLD AUTO: 33 % (ref 40–53)
HCT VFR BLD AUTO: 33.6 % (ref 40–53)
HCT VFR BLD AUTO: 33.7 % (ref 40–53)
HCT VFR BLD AUTO: 33.9 % (ref 40–53)
HCT VFR BLD AUTO: 34 % (ref 40–53)
HCT VFR BLD AUTO: 34.4 % (ref 40–53)
HCT VFR BLD AUTO: 34.7 % (ref 40–53)
HCT VFR BLD AUTO: 34.7 % (ref 40–53)
HCT VFR BLD AUTO: 34.8 % (ref 40–53)
HCT VFR BLD AUTO: 34.8 % (ref 40–53)
HCT VFR BLD AUTO: 35 % (ref 40–53)
HCT VFR BLD AUTO: 35.1 % (ref 40–53)
HCT VFR BLD AUTO: 35.3 % (ref 40–53)
HCT VFR BLD AUTO: 35.4 % (ref 40–53)
HCT VFR BLD AUTO: 35.6 % (ref 40–53)
HCT VFR BLD AUTO: 36.4 % (ref 40–53)
HCT VFR BLD AUTO: 36.5 % (ref 40–53)
HCT VFR BLD AUTO: 37.5 % (ref 40–53)
HCT VFR BLD AUTO: 38.2 % (ref 40–53)
HCT VFR BLD AUTO: 38.5 % (ref 40–53)
HCT VFR BLD AUTO: 38.5 % (ref 40–53)
HCT VFR BLD AUTO: 39.4 % (ref 40–53)
HGB BLD-MCNC: 10 G/DL (ref 13.3–17.7)
HGB BLD-MCNC: 10.1 G/DL (ref 13.3–17.7)
HGB BLD-MCNC: 10.2 G/DL (ref 13.3–17.7)
HGB BLD-MCNC: 10.3 G/DL (ref 13.3–17.7)
HGB BLD-MCNC: 10.4 G/DL (ref 13.3–17.7)
HGB BLD-MCNC: 10.4 G/DL (ref 13.3–17.7)
HGB BLD-MCNC: 10.5 G/DL (ref 13.3–17.7)
HGB BLD-MCNC: 10.5 G/DL (ref 13.3–17.7)
HGB BLD-MCNC: 10.7 G/DL (ref 13.3–17.7)
HGB BLD-MCNC: 10.9 G/DL (ref 13.3–17.7)
HGB BLD-MCNC: 11 G/DL (ref 13.3–17.7)
HGB BLD-MCNC: 11.1 G/DL (ref 13.3–17.7)
HGB BLD-MCNC: 11.2 G/DL (ref 13.3–17.7)
HGB BLD-MCNC: 11.2 G/DL (ref 13.3–17.7)
HGB BLD-MCNC: 11.3 G/DL (ref 13.3–17.7)
HGB BLD-MCNC: 11.3 G/DL (ref 13.3–17.7)
HGB BLD-MCNC: 11.5 G/DL (ref 13.3–17.7)
HGB BLD-MCNC: 11.5 G/DL (ref 13.3–17.7)
HGB BLD-MCNC: 11.7 G/DL (ref 13.3–17.7)
HGB BLD-MCNC: 11.8 G/DL (ref 13.3–17.7)
HGB BLD-MCNC: 12.1 G/DL (ref 13.3–17.7)
HGB BLD-MCNC: 12.1 G/DL (ref 13.3–17.7)
HGB BLD-MCNC: 12.3 G/DL (ref 13.3–17.7)
HGB BLD-MCNC: 12.6 G/DL (ref 13.3–17.7)
HGB BLD-MCNC: 12.9 G/DL (ref 13.3–17.7)
HGB BLD-MCNC: 13 G/DL (ref 13.3–17.7)
HGB BLD-MCNC: 13.2 G/DL (ref 13.3–17.7)
HGB BLD-MCNC: 9.4 G/DL (ref 13.3–17.7)
HGB BLD-MCNC: 9.6 G/DL (ref 13.3–17.7)
HGB BLD-MCNC: 9.8 G/DL (ref 13.3–17.7)
HGB BLD-MCNC: 9.9 G/DL (ref 13.3–17.7)
HGB BLD-MCNC: 9.9 G/DL (ref 13.3–17.7)
HGB UR QL STRIP: NEGATIVE
HYALINE CASTS #/AREA URNS LPF: 1 /LPF (ref 0–2)
HYALINE CASTS #/AREA URNS LPF: 1 /LPF (ref 0–2)
HYALINE CASTS #/AREA URNS LPF: 2 /LPF (ref 0–2)
HYALINE CASTS #/AREA URNS LPF: 5 /LPF (ref 0–2)
HYALINE CASTS #/AREA URNS LPF: 7 /LPF (ref 0–2)
HYALINE CASTS #/AREA URNS LPF: 7 /LPF (ref 0–2)
IMM GRANULOCYTES # BLD: 0 10E9/L (ref 0–0.4)
IMM GRANULOCYTES NFR BLD: 0 %
IMM GRANULOCYTES NFR BLD: 0.3 %
IMM GRANULOCYTES NFR BLD: 0.4 %
IMM GRANULOCYTES NFR BLD: 0.4 %
IMM GRANULOCYTES NFR BLD: 0.5 %
IMM GRANULOCYTES NFR BLD: 0.7 %
IMM GRANULOCYTES NFR BLD: 0.7 %
INR PPP: 1.4 (ref 0.86–1.14)
INR PPP: 1.43 (ref 0.86–1.14)
INR PPP: 1.44 (ref 0.86–1.14)
INR PPP: 1.45 (ref 0.86–1.14)
INR PPP: 1.48 (ref 0.86–1.14)
INR PPP: 1.48 (ref 0.86–1.14)
INR PPP: 1.49 (ref 0.86–1.14)
INR PPP: 1.5 (ref 0.86–1.14)
INR PPP: 1.5 (ref 0.86–1.14)
INR PPP: 1.51 (ref 0.86–1.14)
INR PPP: 1.52 (ref 0.86–1.14)
INR PPP: 1.53 (ref 0.86–1.14)
INR PPP: 1.54 (ref 0.86–1.14)
INR PPP: 1.54 (ref 0.86–1.14)
INR PPP: 1.55 (ref 0.86–1.14)
INR PPP: 1.55 (ref 0.86–1.14)
INR PPP: 1.56 (ref 0.86–1.14)
INR PPP: 1.58 (ref 0.86–1.14)
INR PPP: 1.58 (ref 0.86–1.14)
INR PPP: 1.61 (ref 0.86–1.14)
INR PPP: 1.69 (ref 0.86–1.14)
INR PPP: 1.7 (ref 0.86–1.14)
INR PPP: 1.74 (ref 0.86–1.14)
INR PPP: 1.74 (ref 0.86–1.14)
INR PPP: 1.79 (ref 0.86–1.14)
INR PPP: 1.82 (ref 0.86–1.14)
INR PPP: 1.88 (ref 0.86–1.14)
INR PPP: 1.9 (ref 0.86–1.14)
INTERPRETATION ECG - MUSE: NORMAL
IRON SATN MFR SERPL: 21 % (ref 15–46)
IRON SERPL-MCNC: 36 UG/DL (ref 35–180)
KETONES UR STRIP-MCNC: 10 MG/DL
KETONES UR STRIP-MCNC: 10 MG/DL
KETONES UR STRIP-MCNC: 5 MG/DL
KETONES UR STRIP-MCNC: 5 MG/DL
KETONES UR STRIP-MCNC: NEGATIVE MG/DL
LACTATE BLD-SCNC: 1 MMOL/L (ref 0.7–2)
LACTATE BLD-SCNC: 1 MMOL/L (ref 0.7–2)
LACTATE BLD-SCNC: 1.1 MMOL/L (ref 0.7–2)
LACTATE BLD-SCNC: 1.2 MMOL/L (ref 0.7–2)
LACTATE BLD-SCNC: 1.2 MMOL/L (ref 0.7–2)
LACTATE BLD-SCNC: 1.4 MMOL/L (ref 0.7–2.1)
LACTATE BLD-SCNC: 1.5 MMOL/L (ref 0.7–2.1)
LACTATE BLD-SCNC: 1.8 MMOL/L (ref 0.7–2)
LACTATE BLD-SCNC: 2.3 MMOL/L (ref 0.7–2)
LACTATE BLD-SCNC: 4.9 MMOL/L (ref 0.7–2)
LDH FLD L TO P-CCNC: 33 U/L
LDH SERPL L TO P-CCNC: 168 U/L (ref 85–227)
LEUKOCYTE ESTERASE UR QL STRIP: NEGATIVE
LOWER EUS: NORMAL
LOWER EUS: NORMAL
LYMPHOCYTES # BLD AUTO: 0.3 10E9/L (ref 0.8–5.3)
LYMPHOCYTES # BLD AUTO: 0.4 10E9/L (ref 0.8–5.3)
LYMPHOCYTES # BLD AUTO: 0.5 10E9/L (ref 0.8–5.3)
LYMPHOCYTES NFR BLD AUTO: 12 %
LYMPHOCYTES NFR BLD AUTO: 13.2 %
LYMPHOCYTES NFR BLD AUTO: 13.5 %
LYMPHOCYTES NFR BLD AUTO: 14.7 %
LYMPHOCYTES NFR BLD AUTO: 15.9 %
LYMPHOCYTES NFR BLD AUTO: 16.8 %
LYMPHOCYTES NFR BLD AUTO: 17.6 %
LYMPHOCYTES NFR BLD AUTO: 18.5 %
LYMPHOCYTES NFR BLD AUTO: 18.8 %
LYMPHOCYTES NFR BLD AUTO: 19 %
LYMPHOCYTES NFR BLD AUTO: 22.5 %
LYMPHOCYTES NFR BLD AUTO: 23.4 %
LYMPHOCYTES NFR BLD AUTO: 23.9 %
LYMPHOCYTES NFR BLD AUTO: 9.9 %
LYMPHOCYTES NFR FLD MANUAL: 17 %
LYMPHOCYTES NFR FLD MANUAL: 25 %
LYMPHOCYTES NFR FLD MANUAL: 37 %
LYMPHOCYTES NFR FLD MANUAL: 39 %
LYMPHOCYTES NFR FLD MANUAL: 5 %
LYMPHOCYTES NFR FLD MANUAL: 55 %
LYMPHOCYTES NFR FLD MANUAL: 78 %
Lab: ABNORMAL
Lab: NORMAL
MAGNESIUM SERPL-MCNC: 1.6 MG/DL (ref 1.6–2.3)
MAGNESIUM SERPL-MCNC: 1.7 MG/DL (ref 1.6–2.3)
MAGNESIUM SERPL-MCNC: 1.7 MG/DL (ref 1.6–2.3)
MAGNESIUM SERPL-MCNC: 1.8 MG/DL (ref 1.6–2.3)
MAGNESIUM SERPL-MCNC: 2.1 MG/DL (ref 1.6–2.3)
MCH RBC QN AUTO: 32.2 PG (ref 26.5–33)
MCH RBC QN AUTO: 32.3 PG (ref 26.5–33)
MCH RBC QN AUTO: 32.3 PG (ref 26.5–33)
MCH RBC QN AUTO: 32.4 PG (ref 26.5–33)
MCH RBC QN AUTO: 32.5 PG (ref 26.5–33)
MCH RBC QN AUTO: 32.6 PG (ref 26.5–33)
MCH RBC QN AUTO: 32.7 PG (ref 26.5–33)
MCH RBC QN AUTO: 32.8 PG (ref 26.5–33)
MCH RBC QN AUTO: 33 PG (ref 26.5–33)
MCH RBC QN AUTO: 33.1 PG (ref 26.5–33)
MCH RBC QN AUTO: 33.1 PG (ref 26.5–33)
MCH RBC QN AUTO: 33.4 PG (ref 26.5–33)
MCHC RBC AUTO-ENTMCNC: 32.3 G/DL (ref 31.5–36.5)
MCHC RBC AUTO-ENTMCNC: 32.6 G/DL (ref 31.5–36.5)
MCHC RBC AUTO-ENTMCNC: 32.7 G/DL (ref 31.5–36.5)
MCHC RBC AUTO-ENTMCNC: 32.7 G/DL (ref 31.5–36.5)
MCHC RBC AUTO-ENTMCNC: 32.9 G/DL (ref 31.5–36.5)
MCHC RBC AUTO-ENTMCNC: 32.9 G/DL (ref 31.5–36.5)
MCHC RBC AUTO-ENTMCNC: 33 G/DL (ref 31.5–36.5)
MCHC RBC AUTO-ENTMCNC: 33 G/DL (ref 31.5–36.5)
MCHC RBC AUTO-ENTMCNC: 33.1 G/DL (ref 31.5–36.5)
MCHC RBC AUTO-ENTMCNC: 33.1 G/DL (ref 31.5–36.5)
MCHC RBC AUTO-ENTMCNC: 33.2 G/DL (ref 31.5–36.5)
MCHC RBC AUTO-ENTMCNC: 33.3 G/DL (ref 31.5–36.5)
MCHC RBC AUTO-ENTMCNC: 33.4 G/DL (ref 31.5–36.5)
MCHC RBC AUTO-ENTMCNC: 33.5 G/DL (ref 31.5–36.5)
MCHC RBC AUTO-ENTMCNC: 33.6 G/DL (ref 31.5–36.5)
MCHC RBC AUTO-ENTMCNC: 33.7 G/DL (ref 31.5–36.5)
MCHC RBC AUTO-ENTMCNC: 33.7 G/DL (ref 31.5–36.5)
MCHC RBC AUTO-ENTMCNC: 33.8 G/DL (ref 31.5–36.5)
MCHC RBC AUTO-ENTMCNC: 33.9 G/DL (ref 31.5–36.5)
MCHC RBC AUTO-ENTMCNC: 34 G/DL (ref 31.5–36.5)
MCHC RBC AUTO-ENTMCNC: 34.2 G/DL (ref 31.5–36.5)
MCHC RBC AUTO-ENTMCNC: 34.2 G/DL (ref 31.5–36.5)
MCHC RBC AUTO-ENTMCNC: 34.6 G/DL (ref 31.5–36.5)
MCHC RBC AUTO-ENTMCNC: 34.7 G/DL (ref 31.5–36.5)
MCV RBC AUTO: 100 FL (ref 78–100)
MCV RBC AUTO: 95 FL (ref 78–100)
MCV RBC AUTO: 96 FL (ref 78–100)
MCV RBC AUTO: 97 FL (ref 78–100)
MCV RBC AUTO: 98 FL (ref 78–100)
MCV RBC AUTO: 99 FL (ref 78–100)
MICROALBUMIN UR-MCNC: 10 MG/L
MICROALBUMIN UR-MCNC: 8 MG/L
MICROALBUMIN/CREAT UR: 3.07 MG/G CR (ref 0–17)
MICROALBUMIN/CREAT UR: 7.5 MG/G CR (ref 0–17)
MONOCYTES # BLD AUTO: 0.1 10E9/L (ref 0–1.3)
MONOCYTES # BLD AUTO: 0.1 10E9/L (ref 0–1.3)
MONOCYTES # BLD AUTO: 0.2 10E9/L (ref 0–1.3)
MONOCYTES # BLD AUTO: 0.3 10E9/L (ref 0–1.3)
MONOCYTES # BLD AUTO: 0.4 10E9/L (ref 0–1.3)
MONOCYTES NFR BLD AUTO: 10 %
MONOCYTES NFR BLD AUTO: 10.1 %
MONOCYTES NFR BLD AUTO: 10.1 %
MONOCYTES NFR BLD AUTO: 10.2 %
MONOCYTES NFR BLD AUTO: 10.6 %
MONOCYTES NFR BLD AUTO: 11.2 %
MONOCYTES NFR BLD AUTO: 11.4 %
MONOCYTES NFR BLD AUTO: 13.9 %
MONOCYTES NFR BLD AUTO: 6.3 %
MONOCYTES NFR BLD AUTO: 7.6 %
MONOCYTES NFR BLD AUTO: 8.5 %
MONOCYTES NFR BLD AUTO: 8.9 %
MONOCYTES NFR BLD AUTO: 9.4 %
MONOCYTES NFR BLD AUTO: 9.8 %
MONOS+MACROS NFR FLD MANUAL: 20 %
MONOS+MACROS NFR FLD MANUAL: 53 %
MUCOUS THREADS #/AREA URNS LPF: PRESENT /LPF
NEUTROPHILS # BLD AUTO: 0.9 10E9/L (ref 1.6–8.3)
NEUTROPHILS # BLD AUTO: 0.9 10E9/L (ref 1.6–8.3)
NEUTROPHILS # BLD AUTO: 1 10E9/L (ref 1.6–8.3)
NEUTROPHILS # BLD AUTO: 1 10E9/L (ref 1.6–8.3)
NEUTROPHILS # BLD AUTO: 1.1 10E9/L (ref 1.6–8.3)
NEUTROPHILS # BLD AUTO: 1.2 10E9/L (ref 1.6–8.3)
NEUTROPHILS # BLD AUTO: 1.3 10E9/L (ref 1.6–8.3)
NEUTROPHILS # BLD AUTO: 1.3 10E9/L (ref 1.6–8.3)
NEUTROPHILS # BLD AUTO: 1.4 10E9/L (ref 1.6–8.3)
NEUTROPHILS # BLD AUTO: 1.7 10E9/L (ref 1.6–8.3)
NEUTROPHILS # BLD AUTO: 2 10E9/L (ref 1.6–8.3)
NEUTROPHILS # BLD AUTO: 2.9 10E9/L (ref 1.6–8.3)
NEUTROPHILS NFR BLD AUTO: 53.2 %
NEUTROPHILS NFR BLD AUTO: 59.7 %
NEUTROPHILS NFR BLD AUTO: 60.6 %
NEUTROPHILS NFR BLD AUTO: 61 %
NEUTROPHILS NFR BLD AUTO: 61.9 %
NEUTROPHILS NFR BLD AUTO: 62.7 %
NEUTROPHILS NFR BLD AUTO: 66.4 %
NEUTROPHILS NFR BLD AUTO: 67.7 %
NEUTROPHILS NFR BLD AUTO: 67.7 %
NEUTROPHILS NFR BLD AUTO: 68.1 %
NEUTROPHILS NFR BLD AUTO: 68.2 %
NEUTROPHILS NFR BLD AUTO: 68.6 %
NEUTROPHILS NFR BLD AUTO: 72.9 %
NEUTROPHILS NFR BLD AUTO: 74.3 %
NEUTS BAND NFR FLD MANUAL: 10 %
NEUTS BAND NFR FLD MANUAL: 13 %
NEUTS BAND NFR FLD MANUAL: 14 %
NEUTS BAND NFR FLD MANUAL: 15 %
NEUTS BAND NFR FLD MANUAL: 2 %
NEUTS BAND NFR FLD MANUAL: 5 %
NEUTS BAND NFR FLD MANUAL: 8 %
NITRATE UR QL: NEGATIVE
NRBC # BLD AUTO: 0 10*3/UL
NRBC BLD AUTO-RTO: 0 /100
NUM BPU REQUESTED: 1
NUM BPU REQUESTED: 1
O2/TOTAL GAS SETTING VFR VENT: ABNORMAL %
OTHER CELLS FLD MANUAL: 40 %
OTHER CELLS FLD MANUAL: 47 %
OTHER CELLS FLD MANUAL: 60 %
OTHER CELLS FLD MANUAL: 75 %
OTHER CELLS FLD MANUAL: 78 %
PCO2 BLD: 28 MM HG (ref 35–45)
PCO2 BLDV: 29 MM HG (ref 40–50)
PCO2 BLDV: 32 MM HG (ref 40–50)
PH BLD: 7.46 PH (ref 7.35–7.45)
PH BLDV: 7.37 PH (ref 7.32–7.43)
PH BLDV: 7.4 PH (ref 7.32–7.43)
PH UR STRIP: 5.5 PH (ref 5–7)
PH UR STRIP: 6 PH (ref 5–7)
PH UR STRIP: 6.5 PH (ref 5–7)
PHENYTOIN FREE SERPL-MCNC: 0.77 UG/ML
PHENYTOIN SERPL-MCNC: 9.3 MG/L (ref 10–20)
PHENYTOIN SERPL-MCNC: 9.9 MG/L (ref 10–20)
PLATELET # BLD AUTO: 27 10E9/L (ref 150–450)
PLATELET # BLD AUTO: 29 10E9/L (ref 150–450)
PLATELET # BLD AUTO: 32 10E9/L (ref 150–450)
PLATELET # BLD AUTO: 32 10E9/L (ref 150–450)
PLATELET # BLD AUTO: 33 10E9/L (ref 150–450)
PLATELET # BLD AUTO: 34 10E9/L (ref 150–450)
PLATELET # BLD AUTO: 35 10E9/L (ref 150–450)
PLATELET # BLD AUTO: 36 10E9/L (ref 150–450)
PLATELET # BLD AUTO: 37 10E9/L (ref 150–450)
PLATELET # BLD AUTO: 38 10E9/L (ref 150–450)
PLATELET # BLD AUTO: 39 10E9/L (ref 150–450)
PLATELET # BLD AUTO: 40 10E9/L (ref 150–450)
PLATELET # BLD AUTO: 41 10E9/L (ref 150–450)
PLATELET # BLD AUTO: 42 10E9/L (ref 150–450)
PLATELET # BLD AUTO: 43 10E9/L (ref 150–450)
PLATELET # BLD AUTO: 45 10E9/L (ref 150–450)
PLATELET # BLD AUTO: 45 10E9/L (ref 150–450)
PLATELET # BLD AUTO: 46 10E9/L (ref 150–450)
PLATELET # BLD AUTO: 47 10E9/L (ref 150–450)
PLATELET # BLD AUTO: 48 10E9/L (ref 150–450)
PLATELET # BLD AUTO: 50 10E9/L (ref 150–450)
PLATELET # BLD EST: ABNORMAL 10*3/UL
PO2 BLD: 117 MM HG (ref 80–105)
PO2 BLDV: 39 MM HG (ref 25–47)
PO2 BLDV: 43 MM HG (ref 25–47)
POIKILOCYTOSIS BLD QL SMEAR: SLIGHT
POTASSIUM BLD-SCNC: 3.7 MMOL/L (ref 3.5–5)
POTASSIUM SERPL-SCNC: 2.9 MMOL/L (ref 3.4–5.3)
POTASSIUM SERPL-SCNC: 3.1 MMOL/L (ref 3.4–5.3)
POTASSIUM SERPL-SCNC: 3.2 MMOL/L (ref 3.4–5.3)
POTASSIUM SERPL-SCNC: 3.3 MMOL/L (ref 3.4–5.3)
POTASSIUM SERPL-SCNC: 3.4 MMOL/L (ref 3.4–5.3)
POTASSIUM SERPL-SCNC: 3.5 MMOL/L (ref 3.4–5.3)
POTASSIUM SERPL-SCNC: 3.6 MMOL/L (ref 3.4–5.3)
POTASSIUM SERPL-SCNC: 3.7 MMOL/L (ref 3.4–5.3)
POTASSIUM SERPL-SCNC: 3.8 MMOL/L (ref 3.4–5.3)
POTASSIUM SERPL-SCNC: 3.9 MMOL/L (ref 3.4–5.3)
POTASSIUM SERPL-SCNC: 3.9 MMOL/L (ref 3.4–5.3)
POTASSIUM SERPL-SCNC: 4 MMOL/L (ref 3.4–5.3)
POTASSIUM SERPL-SCNC: 4.1 MMOL/L (ref 3.4–5.3)
POTASSIUM SERPL-SCNC: 4.2 MMOL/L (ref 3.4–5.3)
POTASSIUM SERPL-SCNC: 4.3 MMOL/L (ref 3.4–5.3)
PREALB SERPL IA-MCNC: 8 MG/DL (ref 15–45)
PROCALCITONIN SERPL-MCNC: 0.13 NG/ML
PROCALCITONIN SERPL-MCNC: 0.13 NG/ML
PROT FLD-MCNC: 0.7 G/DL
PROT FLD-MCNC: 0.8 G/DL
PROT FLD-MCNC: 0.8 G/DL
PROT FLD-MCNC: 0.9 G/DL
PROT FLD-MCNC: 1 G/DL
PROT SERPL-MCNC: 5.6 G/DL (ref 6.8–8.8)
PROT SERPL-MCNC: 5.7 G/DL (ref 6.8–8.8)
PROT SERPL-MCNC: 5.8 G/DL (ref 6.8–8.8)
PROT SERPL-MCNC: 5.8 G/DL (ref 6.8–8.8)
PROT SERPL-MCNC: 5.9 G/DL (ref 6.8–8.8)
PROT SERPL-MCNC: 6 G/DL (ref 6.8–8.8)
PROT SERPL-MCNC: 6.1 G/DL (ref 6.8–8.8)
PROT SERPL-MCNC: 6.2 G/DL (ref 6.8–8.8)
PROT SERPL-MCNC: 6.3 G/DL (ref 6.8–8.8)
PROT SERPL-MCNC: 6.3 G/DL (ref 6.8–8.8)
PROT SERPL-MCNC: 6.4 G/DL (ref 6.8–8.8)
PROT SERPL-MCNC: 6.7 G/DL (ref 6.8–8.8)
PROT SERPL-MCNC: 6.9 G/DL (ref 6.8–8.8)
PROT SERPL-MCNC: 6.9 G/DL (ref 6.8–8.8)
PROT SERPL-MCNC: 7.1 G/DL (ref 6.8–8.8)
PROT SERPL-MCNC: 7.4 G/DL (ref 6.8–8.8)
RADIOLOGIST FLAGS: ABNORMAL
RADIOLOGIST FLAGS: ABNORMAL
RBC # BLD AUTO: 2.89 10E12/L (ref 4.4–5.9)
RBC # BLD AUTO: 2.95 10E12/L (ref 4.4–5.9)
RBC # BLD AUTO: 2.96 10E12/L (ref 4.4–5.9)
RBC # BLD AUTO: 3.02 10E12/L (ref 4.4–5.9)
RBC # BLD AUTO: 3.03 10E12/L (ref 4.4–5.9)
RBC # BLD AUTO: 3.03 10E12/L (ref 4.4–5.9)
RBC # BLD AUTO: 3.04 10E12/L (ref 4.4–5.9)
RBC # BLD AUTO: 3.09 10E12/L (ref 4.4–5.9)
RBC # BLD AUTO: 3.11 10E12/L (ref 4.4–5.9)
RBC # BLD AUTO: 3.14 10E12/L (ref 4.4–5.9)
RBC # BLD AUTO: 3.17 10E12/L (ref 4.4–5.9)
RBC # BLD AUTO: 3.19 10E12/L (ref 4.4–5.9)
RBC # BLD AUTO: 3.2 10E12/L (ref 4.4–5.9)
RBC # BLD AUTO: 3.21 10E12/L (ref 4.4–5.9)
RBC # BLD AUTO: 3.22 10E12/L (ref 4.4–5.9)
RBC # BLD AUTO: 3.3 10E12/L (ref 4.4–5.9)
RBC # BLD AUTO: 3.35 10E12/L (ref 4.4–5.9)
RBC # BLD AUTO: 3.36 10E12/L (ref 4.4–5.9)
RBC # BLD AUTO: 3.37 10E12/L (ref 4.4–5.9)
RBC # BLD AUTO: 3.39 10E12/L (ref 4.4–5.9)
RBC # BLD AUTO: 3.4 10E12/L (ref 4.4–5.9)
RBC # BLD AUTO: 3.43 10E12/L (ref 4.4–5.9)
RBC # BLD AUTO: 3.43 10E12/L (ref 4.4–5.9)
RBC # BLD AUTO: 3.44 10E12/L (ref 4.4–5.9)
RBC # BLD AUTO: 3.47 10E12/L (ref 4.4–5.9)
RBC # BLD AUTO: 3.52 10E12/L (ref 4.4–5.9)
RBC # BLD AUTO: 3.54 10E12/L (ref 4.4–5.9)
RBC # BLD AUTO: 3.6 10E12/L (ref 4.4–5.9)
RBC # BLD AUTO: 3.6 10E12/L (ref 4.4–5.9)
RBC # BLD AUTO: 3.62 10E12/L (ref 4.4–5.9)
RBC # BLD AUTO: 3.63 10E12/L (ref 4.4–5.9)
RBC # BLD AUTO: 3.63 10E12/L (ref 4.4–5.9)
RBC # BLD AUTO: 3.66 10E12/L (ref 4.4–5.9)
RBC # BLD AUTO: 3.71 10E12/L (ref 4.4–5.9)
RBC # BLD AUTO: 3.74 10E12/L (ref 4.4–5.9)
RBC # BLD AUTO: 3.77 10E12/L (ref 4.4–5.9)
RBC # BLD AUTO: 3.78 10E12/L (ref 4.4–5.9)
RBC # BLD AUTO: 3.91 10E12/L (ref 4.4–5.9)
RBC # BLD AUTO: 3.96 10E12/L (ref 4.4–5.9)
RBC # BLD AUTO: 3.97 10E12/L (ref 4.4–5.9)
RBC # BLD AUTO: 4.04 10E12/L (ref 4.4–5.9)
RBC # BLD AUTO: 4.04 10E12/L (ref 4.4–5.9)
RBC # FLD: NORMAL /UL
RBC #/AREA URNS AUTO: 0 /HPF (ref 0–2)
RBC #/AREA URNS AUTO: 1 /HPF (ref 0–2)
RBC #/AREA URNS AUTO: 1 /HPF (ref 0–2)
RBC #/AREA URNS AUTO: 2 /HPF (ref 0–2)
RBC #/AREA URNS AUTO: <1 /HPF (ref 0–2)
SAO2 % BLDV FROM PO2: 73 %
SAO2 % BLDV FROM PO2: 79 %
SODIUM SERPL-SCNC: 133 MMOL/L (ref 133–144)
SODIUM SERPL-SCNC: 134 MMOL/L (ref 133–144)
SODIUM SERPL-SCNC: 135 MMOL/L (ref 133–144)
SODIUM SERPL-SCNC: 136 MMOL/L (ref 133–144)
SODIUM SERPL-SCNC: 137 MMOL/L (ref 133–144)
SODIUM SERPL-SCNC: 137 MMOL/L (ref 136–145)
SODIUM SERPL-SCNC: 138 MMOL/L (ref 133–144)
SODIUM SERPL-SCNC: 139 MMOL/L (ref 133–144)
SODIUM SERPL-SCNC: 140 MMOL/L (ref 133–144)
SODIUM SERPL-SCNC: 141 MMOL/L (ref 133–144)
SODIUM SERPL-SCNC: 141 MMOL/L (ref 133–144)
SODIUM SERPL-SCNC: 142 MMOL/L (ref 133–144)
SODIUM SERPL-SCNC: 143 MMOL/L (ref 133–144)
SOURCE: ABNORMAL
SP GR UR STRIP: 1.01 (ref 1–1.03)
SP GR UR STRIP: 1.01 (ref 1–1.03)
SP GR UR STRIP: 1.02 (ref 1–1.03)
SP GR UR STRIP: 1.02 (ref 1–1.03)
SP GR UR STRIP: 1.03 (ref 1–1.03)
SP GR UR STRIP: 1.05 (ref 1–1.03)
SP GR UR STRIP: 1.05 (ref 1–1.03)
SPECIMEN EXP DATE BLD: NORMAL
SPECIMEN SOURCE FLD: NORMAL
SPECIMEN SOURCE: ABNORMAL
SPECIMEN SOURCE: NORMAL
SQUAMOUS #/AREA URNS AUTO: <1 /HPF (ref 0–1)
SQUAMOUS #/AREA URNS AUTO: <1 /HPF (ref 0–1)
T PALLIDUM AB SER QL: NONREACTIVE
T3FREE SERPL-MCNC: 1 PG/ML (ref 2.3–4.2)
T3FREE SERPL-MCNC: 2 PG/ML (ref 2.3–4.2)
T4 FREE SERPL-MCNC: 0.81 NG/DL (ref 0.76–1.46)
T4 FREE SERPL-MCNC: 0.89 NG/DL (ref 0.76–1.46)
T4 FREE SERPL-MCNC: 0.9 NG/DL (ref 0.76–1.46)
T4 FREE SERPL-MCNC: 1.08 NG/DL (ref 0.76–1.46)
T4 FREE SERPL-MCNC: 1.11 NG/DL (ref 0.76–1.46)
TIBC SERPL-MCNC: 176 UG/DL (ref 240–430)
TRANS CELLS #/AREA URNS HPF: <1 /HPF (ref 0–1)
TRANSFERRIN SERPL-MCNC: 149 MG/DL (ref 210–360)
TRANSFUSION STATUS PATIENT QL: NORMAL
TROPONIN I BLD-MCNC: 0 UG/L (ref 0–0.1)
TROPONIN I BLD-MCNC: 0.01 UG/L (ref 0–0.08)
TROPONIN I SERPL-MCNC: <0.015 UG/L (ref 0–0.04)
TROPONIN I SERPL-MCNC: <0.015 UG/L (ref 0–0.04)
TSH SERPL DL<=0.005 MIU/L-ACNC: 4.01 MU/L (ref 0.4–4)
TSH SERPL DL<=0.005 MIU/L-ACNC: 4.05 MU/L (ref 0.4–4)
TSH SERPL DL<=0.005 MIU/L-ACNC: 5.65 MU/L (ref 0.4–4)
TSH SERPL DL<=0.005 MIU/L-ACNC: 6.22 MU/L (ref 0.4–4)
TSH SERPL DL<=0.005 MIU/L-ACNC: 6.73 MU/L (ref 0.4–4)
TSH SERPL DL<=0.005 MIU/L-ACNC: 7.21 MU/L (ref 0.4–4)
TSH SERPL DL<=0.005 MIU/L-ACNC: 8.26 MU/L (ref 0.4–4)
UPPER GI ENDOSCOPY: NORMAL
UROBILINOGEN UR STRIP-MCNC: 2 MG/DL (ref 0–2)
UROBILINOGEN UR STRIP-MCNC: NORMAL MG/DL (ref 0–2)
VIT B12 SERPL-MCNC: 2040 PG/ML (ref 193–986)
VIT B12 SERPL-MCNC: 2524 PG/ML (ref 193–986)
WBC # BLD AUTO: 1.4 10E9/L (ref 4–11)
WBC # BLD AUTO: 1.5 10E9/L (ref 4–11)
WBC # BLD AUTO: 1.5 10E9/L (ref 4–11)
WBC # BLD AUTO: 1.6 10E9/L (ref 4–11)
WBC # BLD AUTO: 1.6 10E9/L (ref 4–11)
WBC # BLD AUTO: 1.7 10E9/L (ref 4–11)
WBC # BLD AUTO: 1.8 10E9/L (ref 4–11)
WBC # BLD AUTO: 1.9 10E9/L (ref 4–11)
WBC # BLD AUTO: 2 10E9/L (ref 4–11)
WBC # BLD AUTO: 2.1 10E9/L (ref 4–11)
WBC # BLD AUTO: 2.2 10E9/L (ref 4–11)
WBC # BLD AUTO: 2.3 10E9/L (ref 4–11)
WBC # BLD AUTO: 2.4 10E9/L (ref 4–11)
WBC # BLD AUTO: 2.7 10E9/L (ref 4–11)
WBC # BLD AUTO: 2.8 10E9/L (ref 4–11)
WBC # BLD AUTO: 3.9 10E9/L (ref 4–11)
WBC # BLD AUTO: 4 10E9/L (ref 4–11)
WBC # BLD AUTO: 4.3 10E9/L (ref 4–11)
WBC # FLD AUTO: 140 /UL
WBC # FLD AUTO: 17 /UL
WBC # FLD AUTO: 59 /UL
WBC # FLD AUTO: 82 /UL
WBC # FLD AUTO: 89 /UL
WBC # FLD AUTO: 92 /UL
WBC # FLD AUTO: 96 /UL
WBC #/AREA URNS AUTO: 1 /HPF (ref 0–5)
WBC #/AREA URNS AUTO: 2 /HPF (ref 0–5)
WBC #/AREA URNS AUTO: 2 /HPF (ref 0–5)
WBC #/AREA URNS AUTO: 3 /HPF (ref 0–5)
WBC #/AREA URNS AUTO: 6 /HPF (ref 0–5)
ZINC SERPL-MCNC: 43 UG/DL (ref 60–120)

## 2018-01-01 PROCEDURE — 85027 COMPLETE CBC AUTOMATED: CPT | Performed by: STUDENT IN AN ORGANIZED HEALTH CARE EDUCATION/TRAINING PROGRAM

## 2018-01-01 PROCEDURE — 97530 THERAPEUTIC ACTIVITIES: CPT | Mod: GO

## 2018-01-01 PROCEDURE — A9270 NON-COVERED ITEM OR SERVICE: HCPCS | Mod: GY | Performed by: EMERGENCY MEDICINE

## 2018-01-01 PROCEDURE — 25000132 ZZH RX MED GY IP 250 OP 250 PS 637: Mod: GY | Performed by: STUDENT IN AN ORGANIZED HEALTH CARE EDUCATION/TRAINING PROGRAM

## 2018-01-01 PROCEDURE — 89051 BODY FLUID CELL COUNT: CPT | Performed by: STUDENT IN AN ORGANIZED HEALTH CARE EDUCATION/TRAINING PROGRAM

## 2018-01-01 PROCEDURE — A9270 NON-COVERED ITEM OR SERVICE: HCPCS | Mod: GY | Performed by: INTERNAL MEDICINE

## 2018-01-01 PROCEDURE — 0W9G3ZZ DRAINAGE OF PERITONEAL CAVITY, PERCUTANEOUS APPROACH: ICD-10-PCS | Performed by: STUDENT IN AN ORGANIZED HEALTH CARE EDUCATION/TRAINING PROGRAM

## 2018-01-01 PROCEDURE — 25000128 H RX IP 250 OP 636: Performed by: STUDENT IN AN ORGANIZED HEALTH CARE EDUCATION/TRAINING PROGRAM

## 2018-01-01 PROCEDURE — 12000001 ZZH R&B MED SURG/OB UMMC

## 2018-01-01 PROCEDURE — A9270 NON-COVERED ITEM OR SERVICE: HCPCS | Performed by: STUDENT IN AN ORGANIZED HEALTH CARE EDUCATION/TRAINING PROGRAM

## 2018-01-01 PROCEDURE — 71000015 ZZH RECOVERY PHASE 1 LEVEL 2 EA ADDTL HR: Performed by: INTERNAL MEDICINE

## 2018-01-01 PROCEDURE — 00000146 ZZHCL STATISTIC GLUCOSE BY METER IP

## 2018-01-01 PROCEDURE — 71045 X-RAY EXAM CHEST 1 VIEW: CPT

## 2018-01-01 PROCEDURE — 25000132 ZZH RX MED GY IP 250 OP 250 PS 637: Mod: GY | Performed by: INTERNAL MEDICINE

## 2018-01-01 PROCEDURE — A9270 NON-COVERED ITEM OR SERVICE: HCPCS | Mod: GY | Performed by: STUDENT IN AN ORGANIZED HEALTH CARE EDUCATION/TRAINING PROGRAM

## 2018-01-01 PROCEDURE — 25000132 ZZH RX MED GY IP 250 OP 250 PS 637: Mod: GY | Performed by: PATHOLOGY

## 2018-01-01 PROCEDURE — 36592 COLLECT BLOOD FROM PICC: CPT | Performed by: STUDENT IN AN ORGANIZED HEALTH CARE EDUCATION/TRAINING PROGRAM

## 2018-01-01 PROCEDURE — 40000133 ZZH STATISTIC OT WARD VISIT

## 2018-01-01 PROCEDURE — 73560 X-RAY EXAM OF KNEE 1 OR 2: CPT | Mod: RT

## 2018-01-01 PROCEDURE — 85610 PROTHROMBIN TIME: CPT | Performed by: INTERNAL MEDICINE

## 2018-01-01 PROCEDURE — 99223 1ST HOSP IP/OBS HIGH 75: CPT | Mod: AI | Performed by: HOSPITALIST

## 2018-01-01 PROCEDURE — 99285 EMERGENCY DEPT VISIT HI MDM: CPT | Mod: 25 | Performed by: EMERGENCY MEDICINE

## 2018-01-01 PROCEDURE — 0CJS8ZZ INSPECTION OF LARYNX, VIA NATURAL OR ARTIFICIAL OPENING ENDOSCOPIC: ICD-10-PCS | Performed by: OTOLARYNGOLOGY

## 2018-01-01 PROCEDURE — 49083 ABD PARACENTESIS W/IMAGING: CPT | Performed by: INTERNAL MEDICINE

## 2018-01-01 PROCEDURE — 97530 THERAPEUTIC ACTIVITIES: CPT | Mod: GP

## 2018-01-01 PROCEDURE — 99285 EMERGENCY DEPT VISIT HI MDM: CPT | Mod: Z6 | Performed by: EMERGENCY MEDICINE

## 2018-01-01 PROCEDURE — 85730 THROMBOPLASTIN TIME PARTIAL: CPT | Performed by: EMERGENCY MEDICINE

## 2018-01-01 PROCEDURE — 85027 COMPLETE CBC AUTOMATED: CPT | Performed by: HOSPITALIST

## 2018-01-01 PROCEDURE — 96365 THER/PROPH/DIAG IV INF INIT: CPT | Performed by: EMERGENCY MEDICINE

## 2018-01-01 PROCEDURE — A9270 NON-COVERED ITEM OR SERVICE: HCPCS | Performed by: PATHOLOGY

## 2018-01-01 PROCEDURE — 25000125 ZZHC RX 250: Performed by: STUDENT IN AN ORGANIZED HEALTH CARE EDUCATION/TRAINING PROGRAM

## 2018-01-01 PROCEDURE — 80048 BASIC METABOLIC PNL TOTAL CA: CPT | Performed by: FAMILY MEDICINE

## 2018-01-01 PROCEDURE — 12000008 ZZH R&B INTERMEDIATE UMMC

## 2018-01-01 PROCEDURE — 83615 LACTATE (LD) (LDH) ENZYME: CPT | Performed by: STUDENT IN AN ORGANIZED HEALTH CARE EDUCATION/TRAINING PROGRAM

## 2018-01-01 PROCEDURE — 40000225 ZZH STATISTIC SLP WARD VISIT

## 2018-01-01 PROCEDURE — 25000128 H RX IP 250 OP 636: Performed by: INTERNAL MEDICINE

## 2018-01-01 PROCEDURE — A9270 NON-COVERED ITEM OR SERVICE: HCPCS | Mod: GY | Performed by: PATHOLOGY

## 2018-01-01 PROCEDURE — 85025 COMPLETE CBC W/AUTO DIFF WBC: CPT | Performed by: INTERNAL MEDICINE

## 2018-01-01 PROCEDURE — C1725 CATH, TRANSLUMIN NON-LASER: HCPCS | Performed by: INTERNAL MEDICINE

## 2018-01-01 PROCEDURE — 36415 COLL VENOUS BLD VENIPUNCTURE: CPT | Performed by: STUDENT IN AN ORGANIZED HEALTH CARE EDUCATION/TRAINING PROGRAM

## 2018-01-01 PROCEDURE — 85610 PROTHROMBIN TIME: CPT | Performed by: STUDENT IN AN ORGANIZED HEALTH CARE EDUCATION/TRAINING PROGRAM

## 2018-01-01 PROCEDURE — 87075 CULTR BACTERIA EXCEPT BLOOD: CPT | Performed by: STUDENT IN AN ORGANIZED HEALTH CARE EDUCATION/TRAINING PROGRAM

## 2018-01-01 PROCEDURE — 99284 EMERGENCY DEPT VISIT MOD MDM: CPT | Mod: Z6 | Performed by: EMERGENCY MEDICINE

## 2018-01-01 PROCEDURE — 87205 SMEAR GRAM STAIN: CPT | Performed by: STUDENT IN AN ORGANIZED HEALTH CARE EDUCATION/TRAINING PROGRAM

## 2018-01-01 PROCEDURE — 84443 ASSAY THYROID STIM HORMONE: CPT | Performed by: STUDENT IN AN ORGANIZED HEALTH CARE EDUCATION/TRAINING PROGRAM

## 2018-01-01 PROCEDURE — 49082 ABD PARACENTESIS: CPT | Mod: GC | Performed by: INTERNAL MEDICINE

## 2018-01-01 PROCEDURE — 84157 ASSAY OF PROTEIN OTHER: CPT | Performed by: INTERNAL MEDICINE

## 2018-01-01 PROCEDURE — 82248 BILIRUBIN DIRECT: CPT | Performed by: STUDENT IN AN ORGANIZED HEALTH CARE EDUCATION/TRAINING PROGRAM

## 2018-01-01 PROCEDURE — 87040 BLOOD CULTURE FOR BACTERIA: CPT | Performed by: FAMILY MEDICINE

## 2018-01-01 PROCEDURE — 96361 HYDRATE IV INFUSION ADD-ON: CPT | Performed by: EMERGENCY MEDICINE

## 2018-01-01 PROCEDURE — 82565 ASSAY OF CREATININE: CPT | Performed by: STUDENT IN AN ORGANIZED HEALTH CARE EDUCATION/TRAINING PROGRAM

## 2018-01-01 PROCEDURE — 40000193 ZZH STATISTIC PT WARD VISIT

## 2018-01-01 PROCEDURE — 36592 COLLECT BLOOD FROM PICC: CPT | Performed by: INTERNAL MEDICINE

## 2018-01-01 PROCEDURE — C9399 UNCLASSIFIED DRUGS OR BIOLOG: HCPCS | Performed by: NURSE ANESTHETIST, CERTIFIED REGISTERED

## 2018-01-01 PROCEDURE — 85027 COMPLETE CBC AUTOMATED: CPT | Performed by: INTERNAL MEDICINE

## 2018-01-01 PROCEDURE — 80156 ASSAY CARBAMAZEPINE TOTAL: CPT | Performed by: STUDENT IN AN ORGANIZED HEALTH CARE EDUCATION/TRAINING PROGRAM

## 2018-01-01 PROCEDURE — 36000051 ZZH SURGERY LEVEL 2 1ST 30 MIN - UMMC: Performed by: INTERNAL MEDICINE

## 2018-01-01 PROCEDURE — 99231 SBSQ HOSP IP/OBS SF/LOW 25: CPT | Mod: GC | Performed by: INTERNAL MEDICINE

## 2018-01-01 PROCEDURE — 99239 HOSP IP/OBS DSCHRG MGMT >30: CPT | Mod: GC | Performed by: INTERNAL MEDICINE

## 2018-01-01 PROCEDURE — 99232 SBSQ HOSP IP/OBS MODERATE 35: CPT | Mod: GC | Performed by: INTERNAL MEDICINE

## 2018-01-01 PROCEDURE — 83605 ASSAY OF LACTIC ACID: CPT | Performed by: EMERGENCY MEDICINE

## 2018-01-01 PROCEDURE — 97161 PT EVAL LOW COMPLEX 20 MIN: CPT | Mod: GP | Performed by: REHABILITATION PRACTITIONER

## 2018-01-01 PROCEDURE — 85610 PROTHROMBIN TIME: CPT | Performed by: EMERGENCY MEDICINE

## 2018-01-01 PROCEDURE — 85610 PROTHROMBIN TIME: CPT | Performed by: ANESTHESIOLOGY

## 2018-01-01 PROCEDURE — A9585 GADOBUTROL INJECTION: HCPCS | Performed by: RADIOLOGY

## 2018-01-01 PROCEDURE — 25000128 H RX IP 250 OP 636: Performed by: PSYCHIATRY & NEUROLOGY

## 2018-01-01 PROCEDURE — 0W9G3ZX DRAINAGE OF PERITONEAL CAVITY, PERCUTANEOUS APPROACH, DIAGNOSTIC: ICD-10-PCS | Performed by: INTERNAL MEDICINE

## 2018-01-01 PROCEDURE — 80053 COMPREHEN METABOLIC PANEL: CPT | Performed by: HOSPITALIST

## 2018-01-01 PROCEDURE — 25000128 H RX IP 250 OP 636: Performed by: NURSE ANESTHETIST, CERTIFIED REGISTERED

## 2018-01-01 PROCEDURE — 27210995 ZZH RX 272: Performed by: INTERNAL MEDICINE

## 2018-01-01 PROCEDURE — 76705 ECHO EXAM OF ABDOMEN: CPT

## 2018-01-01 PROCEDURE — 97165 OT EVAL LOW COMPLEX 30 MIN: CPT | Mod: GO

## 2018-01-01 PROCEDURE — 25000132 ZZH RX MED GY IP 250 OP 250 PS 637: Performed by: STUDENT IN AN ORGANIZED HEALTH CARE EDUCATION/TRAINING PROGRAM

## 2018-01-01 PROCEDURE — 80076 HEPATIC FUNCTION PANEL: CPT | Performed by: INTERNAL MEDICINE

## 2018-01-01 PROCEDURE — 80053 COMPREHEN METABOLIC PANEL: CPT | Performed by: INTERNAL MEDICINE

## 2018-01-01 PROCEDURE — 99233 SBSQ HOSP IP/OBS HIGH 50: CPT | Mod: GC | Performed by: HOSPITALIST

## 2018-01-01 PROCEDURE — 27211024 ZZHC OR SUPPLY OTHER OPNP: Performed by: INTERNAL MEDICINE

## 2018-01-01 PROCEDURE — 82140 ASSAY OF AMMONIA: CPT | Performed by: STUDENT IN AN ORGANIZED HEALTH CARE EDUCATION/TRAINING PROGRAM

## 2018-01-01 PROCEDURE — 99232 SBSQ HOSP IP/OBS MODERATE 35: CPT | Performed by: NURSE PRACTITIONER

## 2018-01-01 PROCEDURE — 84443 ASSAY THYROID STIM HORMONE: CPT | Performed by: INTERNAL MEDICINE

## 2018-01-01 PROCEDURE — 99233 SBSQ HOSP IP/OBS HIGH 50: CPT | Mod: GC | Performed by: INTERNAL MEDICINE

## 2018-01-01 PROCEDURE — 80048 BASIC METABOLIC PNL TOTAL CA: CPT | Performed by: INTERNAL MEDICINE

## 2018-01-01 PROCEDURE — 37000009 ZZH ANESTHESIA TECHNICAL FEE, EACH ADDTL 15 MIN: Performed by: INTERNAL MEDICINE

## 2018-01-01 PROCEDURE — 99223 1ST HOSP IP/OBS HIGH 75: CPT | Mod: AI | Performed by: INTERNAL MEDICINE

## 2018-01-01 PROCEDURE — 83605 ASSAY OF LACTIC ACID: CPT

## 2018-01-01 PROCEDURE — 96366 THER/PROPH/DIAG IV INF ADDON: CPT | Performed by: EMERGENCY MEDICINE

## 2018-01-01 PROCEDURE — 25000125 ZZHC RX 250: Performed by: RADIOLOGY

## 2018-01-01 PROCEDURE — 80053 COMPREHEN METABOLIC PANEL: CPT | Performed by: EMERGENCY MEDICINE

## 2018-01-01 PROCEDURE — 96368 THER/DIAG CONCURRENT INF: CPT | Performed by: EMERGENCY MEDICINE

## 2018-01-01 PROCEDURE — 40000193 ZZH STATISTIC PT WARD VISIT: Performed by: REHABILITATION PRACTITIONER

## 2018-01-01 PROCEDURE — 80185 ASSAY OF PHENYTOIN TOTAL: CPT | Performed by: PSYCHIATRY & NEUROLOGY

## 2018-01-01 PROCEDURE — 87040 BLOOD CULTURE FOR BACTERIA: CPT | Performed by: EMERGENCY MEDICINE

## 2018-01-01 PROCEDURE — 84132 ASSAY OF SERUM POTASSIUM: CPT | Performed by: ANESTHESIOLOGY

## 2018-01-01 PROCEDURE — 84295 ASSAY OF SERUM SODIUM: CPT | Performed by: INTERNAL MEDICINE

## 2018-01-01 PROCEDURE — 84145 PROCALCITONIN (PCT): CPT | Performed by: EMERGENCY MEDICINE

## 2018-01-01 PROCEDURE — 82728 ASSAY OF FERRITIN: CPT | Performed by: STUDENT IN AN ORGANIZED HEALTH CARE EDUCATION/TRAINING PROGRAM

## 2018-01-01 PROCEDURE — 92526 ORAL FUNCTION THERAPY: CPT | Mod: GN

## 2018-01-01 PROCEDURE — 99285 EMERGENCY DEPT VISIT HI MDM: CPT | Mod: 25

## 2018-01-01 PROCEDURE — 36415 COLL VENOUS BLD VENIPUNCTURE: CPT | Performed by: INTERNAL MEDICINE

## 2018-01-01 PROCEDURE — 96360 HYDRATION IV INFUSION INIT: CPT | Performed by: EMERGENCY MEDICINE

## 2018-01-01 PROCEDURE — 97530 THERAPEUTIC ACTIVITIES: CPT | Mod: GP | Performed by: REHABILITATION PRACTITIONER

## 2018-01-01 PROCEDURE — A9270 NON-COVERED ITEM OR SERVICE: HCPCS | Mod: GY | Performed by: HOSPITALIST

## 2018-01-01 PROCEDURE — 87040 BLOOD CULTURE FOR BACTERIA: CPT | Performed by: INTERNAL MEDICINE

## 2018-01-01 PROCEDURE — 25000128 H RX IP 250 OP 636: Performed by: ANESTHESIOLOGY

## 2018-01-01 PROCEDURE — 82607 VITAMIN B-12: CPT | Performed by: STUDENT IN AN ORGANIZED HEALTH CARE EDUCATION/TRAINING PROGRAM

## 2018-01-01 PROCEDURE — 80053 COMPREHEN METABOLIC PANEL: CPT | Performed by: STUDENT IN AN ORGANIZED HEALTH CARE EDUCATION/TRAINING PROGRAM

## 2018-01-01 PROCEDURE — 84484 ASSAY OF TROPONIN QUANT: CPT | Performed by: INTERNAL MEDICINE

## 2018-01-01 PROCEDURE — 97116 GAIT TRAINING THERAPY: CPT | Mod: GP | Performed by: REHABILITATION PRACTITIONER

## 2018-01-01 PROCEDURE — 88112 CYTOPATH CELL ENHANCE TECH: CPT | Performed by: INTERNAL MEDICINE

## 2018-01-01 PROCEDURE — 84443 ASSAY THYROID STIM HORMONE: CPT | Performed by: HOSPITALIST

## 2018-01-01 PROCEDURE — 82962 GLUCOSE BLOOD TEST: CPT

## 2018-01-01 PROCEDURE — 82042 OTHER SOURCE ALBUMIN QUAN EA: CPT | Performed by: PATHOLOGY

## 2018-01-01 PROCEDURE — A9270 NON-COVERED ITEM OR SERVICE: HCPCS | Performed by: INTERNAL MEDICINE

## 2018-01-01 PROCEDURE — 82042 OTHER SOURCE ALBUMIN QUAN EA: CPT | Performed by: INTERNAL MEDICINE

## 2018-01-01 PROCEDURE — 82042 OTHER SOURCE ALBUMIN QUAN EA: CPT | Performed by: STUDENT IN AN ORGANIZED HEALTH CARE EDUCATION/TRAINING PROGRAM

## 2018-01-01 PROCEDURE — 80048 BASIC METABOLIC PNL TOTAL CA: CPT | Performed by: STUDENT IN AN ORGANIZED HEALTH CARE EDUCATION/TRAINING PROGRAM

## 2018-01-01 PROCEDURE — 82565 ASSAY OF CREATININE: CPT | Performed by: INTERNAL MEDICINE

## 2018-01-01 PROCEDURE — 82550 ASSAY OF CK (CPK): CPT | Performed by: STUDENT IN AN ORGANIZED HEALTH CARE EDUCATION/TRAINING PROGRAM

## 2018-01-01 PROCEDURE — 25000125 ZZHC RX 250: Mod: GY | Performed by: INTERNAL MEDICINE

## 2018-01-01 PROCEDURE — 87186 SC STD MICRODIL/AGAR DIL: CPT | Performed by: INTERNAL MEDICINE

## 2018-01-01 PROCEDURE — 12000006 ZZH R&B IMCU INTERMEDIATE UMMC

## 2018-01-01 PROCEDURE — 86900 BLOOD TYPING SEROLOGIC ABO: CPT | Performed by: INTERNAL MEDICINE

## 2018-01-01 PROCEDURE — 25000128 H RX IP 250 OP 636: Performed by: EMERGENCY MEDICINE

## 2018-01-01 PROCEDURE — 25000125 ZZHC RX 250: Performed by: NURSE ANESTHETIST, CERTIFIED REGISTERED

## 2018-01-01 PROCEDURE — 82565 ASSAY OF CREATININE: CPT

## 2018-01-01 PROCEDURE — 70551 MRI BRAIN STEM W/O DYE: CPT

## 2018-01-01 PROCEDURE — 84484 ASSAY OF TROPONIN QUANT: CPT

## 2018-01-01 PROCEDURE — 87070 CULTURE OTHR SPECIMN AEROBIC: CPT | Performed by: STUDENT IN AN ORGANIZED HEALTH CARE EDUCATION/TRAINING PROGRAM

## 2018-01-01 PROCEDURE — 97535 SELF CARE MNGMENT TRAINING: CPT | Mod: GO

## 2018-01-01 PROCEDURE — 87040 BLOOD CULTURE FOR BACTERIA: CPT | Performed by: STUDENT IN AN ORGANIZED HEALTH CARE EDUCATION/TRAINING PROGRAM

## 2018-01-01 PROCEDURE — 84520 ASSAY OF UREA NITROGEN: CPT | Performed by: INTERNAL MEDICINE

## 2018-01-01 PROCEDURE — P9047 ALBUMIN (HUMAN), 25%, 50ML: HCPCS | Performed by: STUDENT IN AN ORGANIZED HEALTH CARE EDUCATION/TRAINING PROGRAM

## 2018-01-01 PROCEDURE — 40000141 ZZH STATISTIC PERIPHERAL IV START W/O US GUIDANCE

## 2018-01-01 PROCEDURE — 82140 ASSAY OF AMMONIA: CPT | Performed by: EMERGENCY MEDICINE

## 2018-01-01 PROCEDURE — 99223 1ST HOSP IP/OBS HIGH 75: CPT | Mod: 25 | Performed by: INTERNAL MEDICINE

## 2018-01-01 PROCEDURE — 25000132 ZZH RX MED GY IP 250 OP 250 PS 637: Mod: GY | Performed by: EMERGENCY MEDICINE

## 2018-01-01 PROCEDURE — 97166 OT EVAL MOD COMPLEX 45 MIN: CPT | Mod: GO

## 2018-01-01 PROCEDURE — 99238 HOSP IP/OBS DSCHRG MGMT 30/<: CPT | Mod: GC | Performed by: HOSPITALIST

## 2018-01-01 PROCEDURE — 84439 ASSAY OF FREE THYROXINE: CPT | Performed by: INTERNAL MEDICINE

## 2018-01-01 PROCEDURE — 49083 ABD PARACENTESIS W/IMAGING: CPT | Performed by: STUDENT IN AN ORGANIZED HEALTH CARE EDUCATION/TRAINING PROGRAM

## 2018-01-01 PROCEDURE — 83605 ASSAY OF LACTIC ACID: CPT | Performed by: INTERNAL MEDICINE

## 2018-01-01 PROCEDURE — 82043 UR ALBUMIN QUANTITATIVE: CPT | Performed by: FAMILY MEDICINE

## 2018-01-01 PROCEDURE — 97161 PT EVAL LOW COMPLEX 20 MIN: CPT | Mod: GP

## 2018-01-01 PROCEDURE — 86140 C-REACTIVE PROTEIN: CPT | Performed by: EMERGENCY MEDICINE

## 2018-01-01 PROCEDURE — 82105 ALPHA-FETOPROTEIN SERUM: CPT | Performed by: INTERNAL MEDICINE

## 2018-01-01 PROCEDURE — 81001 URINALYSIS AUTO W/SCOPE: CPT | Performed by: STUDENT IN AN ORGANIZED HEALTH CARE EDUCATION/TRAINING PROGRAM

## 2018-01-01 PROCEDURE — 74018 RADEX ABDOMEN 1 VIEW: CPT

## 2018-01-01 PROCEDURE — 40000894 ZZH STATISTIC OT IP EVAL DEFER: Performed by: OCCUPATIONAL THERAPIST

## 2018-01-01 PROCEDURE — 71000014 ZZH RECOVERY PHASE 1 LEVEL 2 FIRST HR: Performed by: INTERNAL MEDICINE

## 2018-01-01 PROCEDURE — 95951 ZZHC EEG VIDEO < 12 HR: CPT | Mod: 52,ZF

## 2018-01-01 PROCEDURE — 36000053 ZZH SURGERY LEVEL 2 EA 15 ADDTL MIN - UMMC: Performed by: INTERNAL MEDICINE

## 2018-01-01 PROCEDURE — 87205 SMEAR GRAM STAIN: CPT | Performed by: INTERNAL MEDICINE

## 2018-01-01 PROCEDURE — 40000277 XR SURGERY CARM FLUORO LESS THAN 5 MIN W STILLS: Mod: TC

## 2018-01-01 PROCEDURE — 27210794 ZZH OR GENERAL SUPPLY STERILE: Performed by: INTERNAL MEDICINE

## 2018-01-01 PROCEDURE — 27810169 ZZH OR IMPLANT GENERAL: Performed by: INTERNAL MEDICINE

## 2018-01-01 PROCEDURE — 85025 COMPLETE CBC W/AUTO DIFF WBC: CPT | Performed by: EMERGENCY MEDICINE

## 2018-01-01 PROCEDURE — 82248 BILIRUBIN DIRECT: CPT | Performed by: INTERNAL MEDICINE

## 2018-01-01 PROCEDURE — 87070 CULTURE OTHR SPECIMN AEROBIC: CPT | Performed by: PATHOLOGY

## 2018-01-01 PROCEDURE — 37000008 ZZH ANESTHESIA TECHNICAL FEE, 1ST 30 MIN: Performed by: INTERNAL MEDICINE

## 2018-01-01 PROCEDURE — 83735 ASSAY OF MAGNESIUM: CPT | Performed by: INTERNAL MEDICINE

## 2018-01-01 PROCEDURE — 85027 COMPLETE CBC AUTOMATED: CPT | Performed by: FAMILY MEDICINE

## 2018-01-01 PROCEDURE — 93005 ELECTROCARDIOGRAM TRACING: CPT

## 2018-01-01 PROCEDURE — 84157 ASSAY OF PROTEIN OTHER: CPT | Performed by: PATHOLOGY

## 2018-01-01 PROCEDURE — 86901 BLOOD TYPING SEROLOGIC RH(D): CPT | Performed by: EMERGENCY MEDICINE

## 2018-01-01 PROCEDURE — 97116 GAIT TRAINING THERAPY: CPT | Mod: GP | Performed by: PHYSICAL THERAPIST

## 2018-01-01 PROCEDURE — 86850 RBC ANTIBODY SCREEN: CPT | Performed by: INTERNAL MEDICINE

## 2018-01-01 PROCEDURE — 25000125 ZZHC RX 250: Performed by: INTERNAL MEDICINE

## 2018-01-01 PROCEDURE — 25000132 ZZH RX MED GY IP 250 OP 250 PS 637: Mod: GY | Performed by: HOSPITALIST

## 2018-01-01 PROCEDURE — 71046 X-RAY EXAM CHEST 2 VIEWS: CPT

## 2018-01-01 PROCEDURE — P9047 ALBUMIN (HUMAN), 25%, 50ML: HCPCS | Performed by: PATHOLOGY

## 2018-01-01 PROCEDURE — 40000170 ZZH STATISTIC PRE-PROCEDURE ASSESSMENT II: Performed by: INTERNAL MEDICINE

## 2018-01-01 PROCEDURE — 97116 GAIT TRAINING THERAPY: CPT | Mod: GP

## 2018-01-01 PROCEDURE — 49083 ABD PARACENTESIS W/IMAGING: CPT | Performed by: PEDIATRICS

## 2018-01-01 PROCEDURE — 99221 1ST HOSP IP/OBS SF/LOW 40: CPT | Performed by: NURSE PRACTITIONER

## 2018-01-01 PROCEDURE — 40000193 ZZH STATISTIC PT WARD VISIT: Performed by: PHYSICAL THERAPIST

## 2018-01-01 PROCEDURE — 70450 CT HEAD/BRAIN W/O DYE: CPT

## 2018-01-01 PROCEDURE — 85018 HEMOGLOBIN: CPT | Performed by: STUDENT IN AN ORGANIZED HEALTH CARE EDUCATION/TRAINING PROGRAM

## 2018-01-01 PROCEDURE — 25500064 ZZH RX 255 OP 636: Performed by: RADIOLOGY

## 2018-01-01 PROCEDURE — 36415 COLL VENOUS BLD VENIPUNCTURE: CPT | Performed by: PSYCHIATRY & NEUROLOGY

## 2018-01-01 PROCEDURE — 93010 ELECTROCARDIOGRAM REPORT: CPT | Performed by: INTERNAL MEDICINE

## 2018-01-01 PROCEDURE — 94640 AIRWAY INHALATION TREATMENT: CPT

## 2018-01-01 PROCEDURE — A9270 NON-COVERED ITEM OR SERVICE: HCPCS | Performed by: EMERGENCY MEDICINE

## 2018-01-01 PROCEDURE — 36592 COLLECT BLOOD FROM PICC: CPT | Performed by: HOSPITALIST

## 2018-01-01 PROCEDURE — 83540 ASSAY OF IRON: CPT | Performed by: STUDENT IN AN ORGANIZED HEALTH CARE EDUCATION/TRAINING PROGRAM

## 2018-01-01 PROCEDURE — G0463 HOSPITAL OUTPT CLINIC VISIT: HCPCS

## 2018-01-01 PROCEDURE — 97110 THERAPEUTIC EXERCISES: CPT | Mod: GO

## 2018-01-01 PROCEDURE — 70553 MRI BRAIN STEM W/O & W/DYE: CPT

## 2018-01-01 PROCEDURE — 70553 MRI BRAIN STEM W/O & W/DYE: CPT | Performed by: RADIOLOGY

## 2018-01-01 PROCEDURE — 36415 COLL VENOUS BLD VENIPUNCTURE: CPT | Performed by: ANESTHESIOLOGY

## 2018-01-01 PROCEDURE — 85025 COMPLETE CBC W/AUTO DIFF WBC: CPT | Performed by: STUDENT IN AN ORGANIZED HEALTH CARE EDUCATION/TRAINING PROGRAM

## 2018-01-01 PROCEDURE — 92610 EVALUATE SWALLOWING FUNCTION: CPT | Mod: GN

## 2018-01-01 PROCEDURE — 25000125 ZZHC RX 250: Performed by: EMERGENCY MEDICINE

## 2018-01-01 PROCEDURE — 82525 ASSAY OF COPPER: CPT | Performed by: STUDENT IN AN ORGANIZED HEALTH CARE EDUCATION/TRAINING PROGRAM

## 2018-01-01 PROCEDURE — 25000128 H RX IP 250 OP 636: Performed by: PATHOLOGY

## 2018-01-01 PROCEDURE — 96376 TX/PRO/DX INJ SAME DRUG ADON: CPT | Performed by: EMERGENCY MEDICINE

## 2018-01-01 PROCEDURE — 87015 SPECIMEN INFECT AGNT CONCNTJ: CPT | Performed by: STUDENT IN AN ORGANIZED HEALTH CARE EDUCATION/TRAINING PROGRAM

## 2018-01-01 PROCEDURE — 85004 AUTOMATED DIFF WBC COUNT: CPT | Performed by: STUDENT IN AN ORGANIZED HEALTH CARE EDUCATION/TRAINING PROGRAM

## 2018-01-01 PROCEDURE — 83735 ASSAY OF MAGNESIUM: CPT | Performed by: STUDENT IN AN ORGANIZED HEALTH CARE EDUCATION/TRAINING PROGRAM

## 2018-01-01 PROCEDURE — 85610 PROTHROMBIN TIME: CPT

## 2018-01-01 PROCEDURE — 87070 CULTURE OTHR SPECIMN AEROBIC: CPT | Performed by: INTERNAL MEDICINE

## 2018-01-01 PROCEDURE — 84132 ASSAY OF SERUM POTASSIUM: CPT | Performed by: STUDENT IN AN ORGANIZED HEALTH CARE EDUCATION/TRAINING PROGRAM

## 2018-01-01 PROCEDURE — 82746 ASSAY OF FOLIC ACID SERUM: CPT | Performed by: STUDENT IN AN ORGANIZED HEALTH CARE EDUCATION/TRAINING PROGRAM

## 2018-01-01 PROCEDURE — 36415 COLL VENOUS BLD VENIPUNCTURE: CPT | Performed by: HOSPITALIST

## 2018-01-01 PROCEDURE — 25000132 ZZH RX MED GY IP 250 OP 250 PS 637: Mod: GY | Performed by: PSYCHIATRY & NEUROLOGY

## 2018-01-01 PROCEDURE — 97161 PT EVAL LOW COMPLEX 20 MIN: CPT | Mod: GP | Performed by: PHYSICAL THERAPIST

## 2018-01-01 PROCEDURE — A9270 NON-COVERED ITEM OR SERVICE: HCPCS | Mod: GY | Performed by: NURSE ANESTHETIST, CERTIFIED REGISTERED

## 2018-01-01 PROCEDURE — 89051 BODY FLUID CELL COUNT: CPT | Performed by: PATHOLOGY

## 2018-01-01 PROCEDURE — 93970 EXTREMITY STUDY: CPT

## 2018-01-01 PROCEDURE — 84443 ASSAY THYROID STIM HORMONE: CPT | Performed by: EMERGENCY MEDICINE

## 2018-01-01 PROCEDURE — 36415 COLL VENOUS BLD VENIPUNCTURE: CPT | Performed by: FAMILY MEDICINE

## 2018-01-01 PROCEDURE — 85027 COMPLETE CBC AUTOMATED: CPT | Performed by: ANESTHESIOLOGY

## 2018-01-01 PROCEDURE — 87086 URINE CULTURE/COLONY COUNT: CPT | Performed by: EMERGENCY MEDICINE

## 2018-01-01 PROCEDURE — 25000132 ZZH RX MED GY IP 250 OP 250 PS 637: Performed by: EMERGENCY MEDICINE

## 2018-01-01 PROCEDURE — 95951 ZZHC EEG VIDEO EACH 24 HR: CPT | Mod: ZF

## 2018-01-01 PROCEDURE — 71000027 ZZH RECOVERY PHASE 2 EACH 15 MINS: Performed by: INTERNAL MEDICINE

## 2018-01-01 PROCEDURE — 97530 THERAPEUTIC ACTIVITIES: CPT | Mod: GP | Performed by: PHYSICAL THERAPIST

## 2018-01-01 PROCEDURE — 74183 MRI ABD W/O CNTR FLWD CNTR: CPT | Performed by: RADIOLOGY

## 2018-01-01 PROCEDURE — 92526 ORAL FUNCTION THERAPY: CPT | Mod: GN | Performed by: SPEECH-LANGUAGE PATHOLOGIST

## 2018-01-01 PROCEDURE — 36000070 ZZH SURGERY LEVEL 5 EA 15 ADDTL MIN - UMMC: Performed by: INTERNAL MEDICINE

## 2018-01-01 PROCEDURE — 80048 BASIC METABOLIC PNL TOTAL CA: CPT | Performed by: EMERGENCY MEDICINE

## 2018-01-01 PROCEDURE — 0W9G3ZZ DRAINAGE OF PERITONEAL CAVITY, PERCUTANEOUS APPROACH: ICD-10-PCS | Performed by: INTERNAL MEDICINE

## 2018-01-01 PROCEDURE — 86900 BLOOD TYPING SEROLOGIC ABO: CPT | Performed by: EMERGENCY MEDICINE

## 2018-01-01 PROCEDURE — G0463 HOSPITAL OUTPT CLINIC VISIT: HCPCS | Mod: ZF

## 2018-01-01 PROCEDURE — 25000128 H RX IP 250 OP 636: Performed by: RADIOLOGY

## 2018-01-01 PROCEDURE — 84481 FREE ASSAY (FT-3): CPT | Performed by: HOSPITALIST

## 2018-01-01 PROCEDURE — 99239 HOSP IP/OBS DSCHRG MGMT >30: CPT | Performed by: INTERNAL MEDICINE

## 2018-01-01 PROCEDURE — 25000132 ZZH RX MED GY IP 250 OP 250 PS 637: Performed by: PATHOLOGY

## 2018-01-01 PROCEDURE — 83550 IRON BINDING TEST: CPT | Performed by: STUDENT IN AN ORGANIZED HEALTH CARE EDUCATION/TRAINING PROGRAM

## 2018-01-01 PROCEDURE — 99233 SBSQ HOSP IP/OBS HIGH 50: CPT | Performed by: INTERNAL MEDICINE

## 2018-01-01 PROCEDURE — 34300033 ZZH RX 343: Performed by: INTERNAL MEDICINE

## 2018-01-01 PROCEDURE — 84439 ASSAY OF FREE THYROXINE: CPT | Performed by: HOSPITALIST

## 2018-01-01 PROCEDURE — 84484 ASSAY OF TROPONIN QUANT: CPT | Performed by: EMERGENCY MEDICINE

## 2018-01-01 PROCEDURE — 99285 EMERGENCY DEPT VISIT HI MDM: CPT | Performed by: EMERGENCY MEDICINE

## 2018-01-01 PROCEDURE — 82248 BILIRUBIN DIRECT: CPT | Performed by: HOSPITALIST

## 2018-01-01 PROCEDURE — 84132 ASSAY OF SERUM POTASSIUM: CPT | Performed by: INTERNAL MEDICINE

## 2018-01-01 PROCEDURE — 97535 SELF CARE MNGMENT TRAINING: CPT | Mod: GO | Performed by: OCCUPATIONAL THERAPIST

## 2018-01-01 PROCEDURE — P9037 PLATE PHERES LEUKOREDU IRRAD: HCPCS | Performed by: INTERNAL MEDICINE

## 2018-01-01 PROCEDURE — 89051 BODY FLUID CELL COUNT: CPT | Performed by: INTERNAL MEDICINE

## 2018-01-01 PROCEDURE — 81001 URINALYSIS AUTO W/SCOPE: CPT | Performed by: EMERGENCY MEDICINE

## 2018-01-01 PROCEDURE — 84157 ASSAY OF PROTEIN OTHER: CPT | Performed by: STUDENT IN AN ORGANIZED HEALTH CARE EDUCATION/TRAINING PROGRAM

## 2018-01-01 PROCEDURE — 82040 ASSAY OF SERUM ALBUMIN: CPT | Performed by: STUDENT IN AN ORGANIZED HEALTH CARE EDUCATION/TRAINING PROGRAM

## 2018-01-01 PROCEDURE — 40000171 ZZH STATISTIC PRE-PROCEDURE ASSESSMENT III: Performed by: INTERNAL MEDICINE

## 2018-01-01 PROCEDURE — 84439 ASSAY OF FREE THYROXINE: CPT | Performed by: EMERGENCY MEDICINE

## 2018-01-01 PROCEDURE — 00000102 ZZHCL STATISTIC CYTO WRIGHT STAIN TC: Performed by: INTERNAL MEDICINE

## 2018-01-01 PROCEDURE — 97110 THERAPEUTIC EXERCISES: CPT | Mod: GP

## 2018-01-01 PROCEDURE — 82803 BLOOD GASES ANY COMBINATION: CPT

## 2018-01-01 PROCEDURE — 96360 HYDRATION IV INFUSION INIT: CPT

## 2018-01-01 PROCEDURE — 25000565 ZZH ISOFLURANE, EA 15 MIN: Performed by: INTERNAL MEDICINE

## 2018-01-01 PROCEDURE — 83605 ASSAY OF LACTIC ACID: CPT | Performed by: STUDENT IN AN ORGANIZED HEALTH CARE EDUCATION/TRAINING PROGRAM

## 2018-01-01 PROCEDURE — 86850 RBC ANTIBODY SCREEN: CPT | Performed by: EMERGENCY MEDICINE

## 2018-01-01 PROCEDURE — 74177 CT ABD & PELVIS W/CONTRAST: CPT

## 2018-01-01 PROCEDURE — 85610 PROTHROMBIN TIME: CPT | Performed by: HOSPITALIST

## 2018-01-01 PROCEDURE — 00000155 ZZHCL STATISTIC H-CELL BLOCK W/STAIN: Performed by: INTERNAL MEDICINE

## 2018-01-01 PROCEDURE — 97162 PT EVAL MOD COMPLEX 30 MIN: CPT | Mod: GP

## 2018-01-01 PROCEDURE — 82607 VITAMIN B-12: CPT | Performed by: HOSPITALIST

## 2018-01-01 PROCEDURE — 71046 X-RAY EXAM CHEST 2 VIEWS: CPT | Performed by: RADIOLOGY

## 2018-01-01 PROCEDURE — 70498 CT ANGIOGRAPHY NECK: CPT

## 2018-01-01 PROCEDURE — 82803 BLOOD GASES ANY COMBINATION: CPT | Performed by: INTERNAL MEDICINE

## 2018-01-01 PROCEDURE — 86901 BLOOD TYPING SEROLOGIC RH(D): CPT | Performed by: ANESTHESIOLOGY

## 2018-01-01 PROCEDURE — 99607 MTMS BY PHARM ADDL 15 MIN: CPT | Performed by: PHARMACIST

## 2018-01-01 PROCEDURE — 82945 GLUCOSE OTHER FLUID: CPT | Performed by: STUDENT IN AN ORGANIZED HEALTH CARE EDUCATION/TRAINING PROGRAM

## 2018-01-01 PROCEDURE — 80076 HEPATIC FUNCTION PANEL: CPT | Performed by: STUDENT IN AN ORGANIZED HEALTH CARE EDUCATION/TRAINING PROGRAM

## 2018-01-01 PROCEDURE — 76870 US EXAM SCROTUM: CPT

## 2018-01-01 PROCEDURE — 40000739 ZZH STATISTIC STROKE CODE W/O ACCESS

## 2018-01-01 PROCEDURE — 73130 X-RAY EXAM OF HAND: CPT | Mod: LT

## 2018-01-01 PROCEDURE — 87076 CULTURE ANAEROBE IDENT EACH: CPT | Performed by: STUDENT IN AN ORGANIZED HEALTH CARE EDUCATION/TRAINING PROGRAM

## 2018-01-01 PROCEDURE — 99214 OFFICE O/P EST MOD 30 MIN: CPT | Mod: ZP | Performed by: INTERNAL MEDICINE

## 2018-01-01 PROCEDURE — 40000133 ZZH STATISTIC OT WARD VISIT: Performed by: OCCUPATIONAL THERAPIST

## 2018-01-01 PROCEDURE — 25000566 ZZH SEVOFLURANE, EA 15 MIN: Performed by: INTERNAL MEDICINE

## 2018-01-01 PROCEDURE — 80053 COMPREHEN METABOLIC PANEL: CPT | Performed by: PATHOLOGY

## 2018-01-01 PROCEDURE — 86780 TREPONEMA PALLIDUM: CPT | Performed by: HOSPITALIST

## 2018-01-01 PROCEDURE — 86850 RBC ANTIBODY SCREEN: CPT | Performed by: ANESTHESIOLOGY

## 2018-01-01 PROCEDURE — 99233 SBSQ HOSP IP/OBS HIGH 50: CPT | Performed by: NURSE PRACTITIONER

## 2018-01-01 PROCEDURE — 78226 HEPATOBILIARY SYSTEM IMAGING: CPT

## 2018-01-01 PROCEDURE — 36592 COLLECT BLOOD FROM PICC: CPT | Performed by: PATHOLOGY

## 2018-01-01 PROCEDURE — 86900 BLOOD TYPING SEROLOGIC ABO: CPT | Performed by: ANESTHESIOLOGY

## 2018-01-01 PROCEDURE — 25000128 H RX IP 250 OP 636: Performed by: PEDIATRICS

## 2018-01-01 PROCEDURE — 84630 ASSAY OF ZINC: CPT | Performed by: STUDENT IN AN ORGANIZED HEALTH CARE EDUCATION/TRAINING PROGRAM

## 2018-01-01 PROCEDURE — 25000128 H RX IP 250 OP 636: Performed by: HOSPITALIST

## 2018-01-01 PROCEDURE — 87493 C DIFF AMPLIFIED PROBE: CPT | Performed by: STUDENT IN AN ORGANIZED HEALTH CARE EDUCATION/TRAINING PROGRAM

## 2018-01-01 PROCEDURE — 84481 FREE ASSAY (FT-3): CPT | Performed by: STUDENT IN AN ORGANIZED HEALTH CARE EDUCATION/TRAINING PROGRAM

## 2018-01-01 PROCEDURE — 88305 TISSUE EXAM BY PATHOLOGIST: CPT | Performed by: INTERNAL MEDICINE

## 2018-01-01 PROCEDURE — 93005 ELECTROCARDIOGRAM TRACING: CPT | Performed by: EMERGENCY MEDICINE

## 2018-01-01 PROCEDURE — 86901 BLOOD TYPING SEROLOGIC RH(D): CPT | Performed by: INTERNAL MEDICINE

## 2018-01-01 PROCEDURE — 87800 DETECT AGNT MULT DNA DIREC: CPT | Performed by: INTERNAL MEDICINE

## 2018-01-01 PROCEDURE — 80185 ASSAY OF PHENYTOIN TOTAL: CPT | Performed by: EMERGENCY MEDICINE

## 2018-01-01 PROCEDURE — 25000131 ZZH RX MED GY IP 250 OP 636 PS 637: Mod: GY | Performed by: EMERGENCY MEDICINE

## 2018-01-01 PROCEDURE — 0W9G3ZZ DRAINAGE OF PERITONEAL CAVITY, PERCUTANEOUS APPROACH: ICD-10-PCS | Performed by: PEDIATRICS

## 2018-01-01 PROCEDURE — 97165 OT EVAL LOW COMPLEX 30 MIN: CPT | Mod: GO | Performed by: OCCUPATIONAL THERAPIST

## 2018-01-01 PROCEDURE — 25000128 H RX IP 250 OP 636

## 2018-01-01 PROCEDURE — 99605 MTMS BY PHARM NP 15 MIN: CPT | Performed by: PHARMACIST

## 2018-01-01 PROCEDURE — 40000225 ZZH STATISTIC SLP WARD VISIT: Performed by: SPEECH-LANGUAGE PATHOLOGIST

## 2018-01-01 PROCEDURE — 82330 ASSAY OF CALCIUM: CPT | Performed by: STUDENT IN AN ORGANIZED HEALTH CARE EDUCATION/TRAINING PROGRAM

## 2018-01-01 PROCEDURE — 36592 COLLECT BLOOD FROM PICC: CPT | Performed by: PEDIATRICS

## 2018-01-01 PROCEDURE — 36600 WITHDRAWAL OF ARTERIAL BLOOD: CPT

## 2018-01-01 PROCEDURE — 70360 X-RAY EXAM OF NECK: CPT

## 2018-01-01 PROCEDURE — 80048 BASIC METABOLIC PNL TOTAL CA: CPT | Performed by: ANESTHESIOLOGY

## 2018-01-01 PROCEDURE — 81001 URINALYSIS AUTO W/SCOPE: CPT | Performed by: INTERNAL MEDICINE

## 2018-01-01 PROCEDURE — 93975 VASCULAR STUDY: CPT | Mod: TC

## 2018-01-01 PROCEDURE — 71260 CT THORAX DX C+: CPT

## 2018-01-01 PROCEDURE — 84132 ASSAY OF SERUM POTASSIUM: CPT | Performed by: PEDIATRICS

## 2018-01-01 PROCEDURE — 85027 COMPLETE CBC AUTOMATED: CPT | Performed by: PATHOLOGY

## 2018-01-01 PROCEDURE — 84134 ASSAY OF PREALBUMIN: CPT | Performed by: STUDENT IN AN ORGANIZED HEALTH CARE EDUCATION/TRAINING PROGRAM

## 2018-01-01 PROCEDURE — 36000068 ZZH SURGERY LEVEL 5 1ST 30 MIN - UMMC: Performed by: INTERNAL MEDICINE

## 2018-01-01 PROCEDURE — 87205 SMEAR GRAM STAIN: CPT | Performed by: PATHOLOGY

## 2018-01-01 PROCEDURE — 83615 LACTATE (LD) (LDH) ENZYME: CPT | Performed by: PATHOLOGY

## 2018-01-01 PROCEDURE — 25000132 ZZH RX MED GY IP 250 OP 250 PS 637: Mod: GY | Performed by: NURSE ANESTHETIST, CERTIFIED REGISTERED

## 2018-01-01 PROCEDURE — 84466 ASSAY OF TRANSFERRIN: CPT | Performed by: STUDENT IN AN ORGANIZED HEALTH CARE EDUCATION/TRAINING PROGRAM

## 2018-01-01 PROCEDURE — 87077 CULTURE AEROBIC IDENTIFY: CPT | Performed by: INTERNAL MEDICINE

## 2018-01-01 PROCEDURE — A9537 TC99M MEBROFENIN: HCPCS | Performed by: INTERNAL MEDICINE

## 2018-01-01 PROCEDURE — 80186 ASSAY OF PHENYTOIN FREE: CPT | Performed by: PSYCHIATRY & NEUROLOGY

## 2018-01-01 PROCEDURE — 82550 ASSAY OF CK (CPK): CPT | Performed by: INTERNAL MEDICINE

## 2018-01-01 DEVICE — IMPLANTABLE DEVICE: Type: IMPLANTABLE DEVICE | Status: FUNCTIONAL

## 2018-01-01 RX ORDER — LEVOTHYROXINE SODIUM 88 UG/1
88 TABLET ORAL DAILY
Status: DISCONTINUED | OUTPATIENT
Start: 2018-01-01 | End: 2018-01-01 | Stop reason: HOSPADM

## 2018-01-01 RX ORDER — GABAPENTIN 100 MG/1
CAPSULE ORAL
Qty: 90 CAPSULE | Refills: 0 | Status: SHIPPED | OUTPATIENT
Start: 2018-01-01 | End: 2018-01-01

## 2018-01-01 RX ORDER — FUROSEMIDE 20 MG
20 TABLET ORAL DAILY
Status: DISCONTINUED | OUTPATIENT
Start: 2018-01-01 | End: 2018-01-01 | Stop reason: HOSPADM

## 2018-01-01 RX ORDER — HEPARIN SODIUM,PORCINE 10 UNIT/ML
5-10 VIAL (ML) INTRAVENOUS
Status: DISCONTINUED | OUTPATIENT
Start: 2018-01-01 | End: 2018-01-01 | Stop reason: HOSPADM

## 2018-01-01 RX ORDER — SPIRONOLACTONE 50 MG/1
50 TABLET, FILM COATED ORAL DAILY
Status: DISCONTINUED | OUTPATIENT
Start: 2018-01-01 | End: 2018-01-01

## 2018-01-01 RX ORDER — HEPARIN SODIUM (PORCINE) LOCK FLUSH IV SOLN 100 UNIT/ML 100 UNIT/ML
5 SOLUTION INTRAVENOUS
Status: DISCONTINUED | OUTPATIENT
Start: 2018-01-01 | End: 2018-01-01 | Stop reason: HOSPADM

## 2018-01-01 RX ORDER — GABAPENTIN 100 MG/1
100 CAPSULE ORAL AT BEDTIME
Status: DISCONTINUED | OUTPATIENT
Start: 2018-01-01 | End: 2018-01-01 | Stop reason: HOSPADM

## 2018-01-01 RX ORDER — FLUMAZENIL 0.1 MG/ML
0.2 INJECTION, SOLUTION INTRAVENOUS
Status: DISCONTINUED | OUTPATIENT
Start: 2018-01-01 | End: 2018-01-01 | Stop reason: HOSPADM

## 2018-01-01 RX ORDER — LACTULOSE 10 G/15ML
60 SOLUTION ORAL 2 TIMES DAILY
Qty: 3600 ML | Refills: 3 | Status: ON HOLD | OUTPATIENT
Start: 2018-01-01 | End: 2019-01-01

## 2018-01-01 RX ORDER — ONDANSETRON 2 MG/ML
4 INJECTION INTRAMUSCULAR; INTRAVENOUS EVERY 6 HOURS PRN
Status: DISCONTINUED | OUTPATIENT
Start: 2018-01-01 | End: 2018-01-01 | Stop reason: HOSPADM

## 2018-01-01 RX ORDER — CARBAMAZEPINE 100 MG/1
TABLET, CHEWABLE ORAL
Refills: 0 | DISCHARGE
Start: 2018-01-01 | End: 2019-01-01

## 2018-01-01 RX ORDER — LIDOCAINE 40 MG/G
CREAM TOPICAL
Status: DISCONTINUED | OUTPATIENT
Start: 2018-01-01 | End: 2018-01-01 | Stop reason: HOSPADM

## 2018-01-01 RX ORDER — FERROUS SULFATE 325(65) MG
325 TABLET ORAL 2 TIMES DAILY
Status: DISCONTINUED | OUTPATIENT
Start: 2018-01-01 | End: 2018-01-01 | Stop reason: HOSPADM

## 2018-01-01 RX ORDER — POTASSIUM CHLORIDE 1.5 G/1.58G
20-40 POWDER, FOR SOLUTION ORAL
Status: DISCONTINUED | OUTPATIENT
Start: 2018-01-01 | End: 2018-01-01 | Stop reason: HOSPADM

## 2018-01-01 RX ORDER — LEVOFLOXACIN 5 MG/ML
INJECTION, SOLUTION INTRAVENOUS PRN
Status: DISCONTINUED | OUTPATIENT
Start: 2018-01-01 | End: 2018-01-01

## 2018-01-01 RX ORDER — FUROSEMIDE 20 MG
20 TABLET ORAL DAILY
Status: DISCONTINUED | OUTPATIENT
Start: 2018-01-01 | End: 2018-01-01

## 2018-01-01 RX ORDER — ONDANSETRON 2 MG/ML
INJECTION INTRAMUSCULAR; INTRAVENOUS PRN
Status: DISCONTINUED | OUTPATIENT
Start: 2018-01-01 | End: 2018-01-01

## 2018-01-01 RX ORDER — SPIRONOLACTONE 25 MG/1
50 TABLET ORAL DAILY
Qty: 30 TABLET | Refills: 3 | Status: SHIPPED | OUTPATIENT
Start: 2018-01-01 | End: 2018-01-01

## 2018-01-01 RX ORDER — LANOLIN ALCOHOL/MO/W.PET/CERES
1000 CREAM (GRAM) TOPICAL DAILY
Status: DISCONTINUED | OUTPATIENT
Start: 2018-01-01 | End: 2018-01-01 | Stop reason: HOSPADM

## 2018-01-01 RX ORDER — CARBAMAZEPINE 100 MG/1
200 TABLET, CHEWABLE ORAL DAILY
Status: DISCONTINUED | OUTPATIENT
Start: 2018-01-01 | End: 2018-01-01 | Stop reason: HOSPADM

## 2018-01-01 RX ORDER — HEPARIN SODIUM,PORCINE 10 UNIT/ML
5-10 VIAL (ML) INTRAVENOUS EVERY 24 HOURS
Status: DISCONTINUED | OUTPATIENT
Start: 2018-01-01 | End: 2018-01-01 | Stop reason: HOSPADM

## 2018-01-01 RX ORDER — CEFTRIAXONE 2 G/1
2 INJECTION, POWDER, FOR SOLUTION INTRAMUSCULAR; INTRAVENOUS EVERY 24 HOURS
Status: DISCONTINUED | OUTPATIENT
Start: 2018-01-01 | End: 2018-01-01

## 2018-01-01 RX ORDER — HYDROXYZINE HYDROCHLORIDE 25 MG/1
25 TABLET, FILM COATED ORAL 2 TIMES DAILY
Qty: 180 TABLET | Refills: 3 | Status: SHIPPED | OUTPATIENT
Start: 2018-01-01 | End: 2018-01-01

## 2018-01-01 RX ORDER — POTASSIUM CL/LIDO/0.9 % NACL 10MEQ/0.1L
10 INTRAVENOUS SOLUTION, PIGGYBACK (ML) INTRAVENOUS
Status: DISCONTINUED | OUTPATIENT
Start: 2018-01-01 | End: 2018-01-01 | Stop reason: HOSPADM

## 2018-01-01 RX ORDER — NALOXONE HYDROCHLORIDE 0.4 MG/ML
.1-.4 INJECTION, SOLUTION INTRAMUSCULAR; INTRAVENOUS; SUBCUTANEOUS
Status: DISCONTINUED | OUTPATIENT
Start: 2018-01-01 | End: 2018-01-01 | Stop reason: HOSPADM

## 2018-01-01 RX ORDER — CARBAMAZEPINE 200 MG/1
200 TABLET ORAL 2 TIMES DAILY
Qty: 120 TABLET | Refills: 11 | Status: ON HOLD | OUTPATIENT
Start: 2018-01-01 | End: 2018-01-01

## 2018-01-01 RX ORDER — CARBAMAZEPINE 200 MG/1
200 TABLET ORAL 2 TIMES DAILY
Status: DISCONTINUED | OUTPATIENT
Start: 2018-01-01 | End: 2018-01-01 | Stop reason: HOSPADM

## 2018-01-01 RX ORDER — POTASSIUM CHLORIDE 750 MG/1
20-40 TABLET, EXTENDED RELEASE ORAL
Status: DISCONTINUED | OUTPATIENT
Start: 2018-01-01 | End: 2018-01-01 | Stop reason: HOSPADM

## 2018-01-01 RX ORDER — LACTULOSE 10 G/15ML
60 SOLUTION ORAL DAILY PRN
Refills: 0 | DISCHARGE
Start: 2018-01-01 | End: 2019-01-01

## 2018-01-01 RX ORDER — FOLIC ACID 1 MG/1
1 TABLET ORAL DAILY
Status: DISCONTINUED | OUTPATIENT
Start: 2018-01-01 | End: 2018-01-01 | Stop reason: HOSPADM

## 2018-01-01 RX ORDER — OCTREOTIDE ACETATE 50 UG/ML
50 INJECTION, SOLUTION INTRAVENOUS; SUBCUTANEOUS ONCE
Status: COMPLETED | OUTPATIENT
Start: 2018-01-01 | End: 2018-01-01

## 2018-01-01 RX ORDER — METHYLPHENIDATE HYDROCHLORIDE 10 MG/1
10 TABLET ORAL 2 TIMES DAILY
Qty: 60 TABLET | Refills: 0 | Status: SHIPPED | OUTPATIENT
Start: 2018-01-01 | End: 2018-01-01

## 2018-01-01 RX ORDER — MIRTAZAPINE 45 MG/1
45 TABLET, FILM COATED ORAL AT BEDTIME
Status: DISCONTINUED | OUTPATIENT
Start: 2018-01-01 | End: 2018-01-01 | Stop reason: HOSPADM

## 2018-01-01 RX ORDER — MULTIPLE VITAMINS W/ MINERALS TAB 9MG-400MCG
1 TAB ORAL 2 TIMES DAILY
Status: DISCONTINUED | OUTPATIENT
Start: 2018-01-01 | End: 2018-01-01

## 2018-01-01 RX ORDER — LACTULOSE 10 G/15ML
20 SOLUTION ORAL 3 TIMES DAILY
Qty: 946 ML | Refills: 3 | Status: SHIPPED | OUTPATIENT
Start: 2018-01-01 | End: 2018-01-01

## 2018-01-01 RX ORDER — MULTIVIT-MIN/IRON/FOLIC ACID/K 18-600-40
1 CAPSULE ORAL DAILY
COMMUNITY

## 2018-01-01 RX ORDER — ONDANSETRON 4 MG/1
4 TABLET, ORALLY DISINTEGRATING ORAL EVERY 30 MIN PRN
Status: DISCONTINUED | OUTPATIENT
Start: 2018-01-01 | End: 2018-01-01 | Stop reason: HOSPADM

## 2018-01-01 RX ORDER — LACTULOSE 10 G/15ML
100 SOLUTION ORAL
Status: DISCONTINUED | OUTPATIENT
Start: 2018-01-01 | End: 2018-01-01

## 2018-01-01 RX ORDER — FOLIC ACID 5 MG/ML
1 INJECTION, SOLUTION INTRAMUSCULAR; INTRAVENOUS; SUBCUTANEOUS DAILY
Status: DISCONTINUED | OUTPATIENT
Start: 2018-01-01 | End: 2018-01-01 | Stop reason: HOSPADM

## 2018-01-01 RX ORDER — SODIUM CHLORIDE, SODIUM LACTATE, POTASSIUM CHLORIDE, CALCIUM CHLORIDE 600; 310; 30; 20 MG/100ML; MG/100ML; MG/100ML; MG/100ML
INJECTION, SOLUTION INTRAVENOUS CONTINUOUS
Status: DISCONTINUED | OUTPATIENT
Start: 2018-01-01 | End: 2018-01-01 | Stop reason: HOSPADM

## 2018-01-01 RX ORDER — LACTULOSE 10 G/15ML
20 SOLUTION ORAL 3 TIMES DAILY
Status: DISCONTINUED | OUTPATIENT
Start: 2018-01-01 | End: 2018-01-01 | Stop reason: HOSPADM

## 2018-01-01 RX ORDER — LANOLIN ALCOHOL/MO/W.PET/CERES
100 CREAM (GRAM) TOPICAL DAILY
Status: DISCONTINUED | OUTPATIENT
Start: 2018-01-01 | End: 2018-01-01 | Stop reason: HOSPADM

## 2018-01-01 RX ORDER — AMOXICILLIN 250 MG
1 CAPSULE ORAL 2 TIMES DAILY
Status: DISCONTINUED | OUTPATIENT
Start: 2018-01-01 | End: 2018-01-01

## 2018-01-01 RX ORDER — ACETAMINOPHEN 325 MG/1
650 TABLET ORAL EVERY 6 HOURS PRN
Status: DISCONTINUED | OUTPATIENT
Start: 2018-01-01 | End: 2018-01-01 | Stop reason: HOSPADM

## 2018-01-01 RX ORDER — LACTULOSE 10 G/15ML
60 SOLUTION ORAL 2 TIMES DAILY
Status: DISCONTINUED | OUTPATIENT
Start: 2018-01-01 | End: 2018-01-01 | Stop reason: HOSPADM

## 2018-01-01 RX ORDER — MULTIPLE VITAMINS W/ MINERALS TAB 9MG-400MCG
1 TAB ORAL DAILY
Status: DISCONTINUED | OUTPATIENT
Start: 2018-01-01 | End: 2018-01-01 | Stop reason: HOSPADM

## 2018-01-01 RX ORDER — LACTULOSE 10 G/15ML
10 SOLUTION ORAL DAILY
Status: DISCONTINUED | OUTPATIENT
Start: 2018-01-01 | End: 2018-01-01

## 2018-01-01 RX ORDER — LEVOFLOXACIN 500 MG/1
500 TABLET, FILM COATED ORAL DAILY
Status: DISCONTINUED | OUTPATIENT
Start: 2018-01-01 | End: 2018-01-01

## 2018-01-01 RX ORDER — HEPARIN SODIUM,PORCINE 10 UNIT/ML
3 VIAL (ML) INTRAVENOUS
Status: DISCONTINUED | OUTPATIENT
Start: 2018-01-01 | End: 2018-01-01 | Stop reason: HOSPADM

## 2018-01-01 RX ORDER — DULAGLUTIDE 0.75 MG/.5ML
INJECTION, SOLUTION SUBCUTANEOUS
Refills: 3 | OUTPATIENT
Start: 2018-01-01

## 2018-01-01 RX ORDER — MEPERIDINE HYDROCHLORIDE 50 MG/ML
12.5 INJECTION INTRAMUSCULAR; INTRAVENOUS; SUBCUTANEOUS
Status: DISCONTINUED | OUTPATIENT
Start: 2018-01-01 | End: 2018-01-01 | Stop reason: HOSPADM

## 2018-01-01 RX ORDER — IOPAMIDOL 755 MG/ML
115 INJECTION, SOLUTION INTRAVASCULAR ONCE
Status: COMPLETED | OUTPATIENT
Start: 2018-01-01 | End: 2018-01-01

## 2018-01-01 RX ORDER — HEPARIN SODIUM,PORCINE 10 UNIT/ML
3-6 VIAL (ML) INTRAVENOUS EVERY 24 HOURS
Status: DISCONTINUED | OUTPATIENT
Start: 2018-01-01 | End: 2018-01-01 | Stop reason: HOSPADM

## 2018-01-01 RX ORDER — FUROSEMIDE 20 MG
20 TABLET ORAL DAILY
Qty: 30 TABLET | Refills: 3 | Status: SHIPPED | OUTPATIENT
Start: 2018-01-01 | End: 2019-01-01

## 2018-01-01 RX ORDER — HEPARIN SODIUM,PORCINE 10 UNIT/ML
5-10 VIAL (ML) INTRAVENOUS EVERY 24 HOURS
Status: DISCONTINUED | OUTPATIENT
Start: 2018-01-01 | End: 2018-01-01 | Stop reason: CLARIF

## 2018-01-01 RX ORDER — PRAVASTATIN SODIUM 40 MG
80 TABLET ORAL DAILY
Status: DISCONTINUED | OUTPATIENT
Start: 2018-01-01 | End: 2018-01-01 | Stop reason: HOSPADM

## 2018-01-01 RX ORDER — METHYLPHENIDATE HYDROCHLORIDE 10 MG/1
10 TABLET ORAL 2 TIMES DAILY
Status: DISCONTINUED | OUTPATIENT
Start: 2018-01-01 | End: 2018-01-01 | Stop reason: HOSPADM

## 2018-01-01 RX ORDER — GADOBUTROL 604.72 MG/ML
0.1 INJECTION INTRAVENOUS ONCE
Status: COMPLETED | OUTPATIENT
Start: 2018-01-01 | End: 2018-01-01

## 2018-01-01 RX ORDER — ONDANSETRON 2 MG/ML
4 INJECTION INTRAMUSCULAR; INTRAVENOUS EVERY 30 MIN PRN
Status: DISCONTINUED | OUTPATIENT
Start: 2018-01-01 | End: 2018-01-01 | Stop reason: HOSPADM

## 2018-01-01 RX ORDER — CEFTRIAXONE 1 G/1
1 INJECTION, POWDER, FOR SOLUTION INTRAMUSCULAR; INTRAVENOUS EVERY 24 HOURS
Status: DISCONTINUED | OUTPATIENT
Start: 2018-01-01 | End: 2018-01-01

## 2018-01-01 RX ORDER — FENTANYL CITRATE 50 UG/ML
25-50 INJECTION, SOLUTION INTRAMUSCULAR; INTRAVENOUS EVERY 5 MIN PRN
Status: DISCONTINUED | OUTPATIENT
Start: 2018-01-01 | End: 2018-01-01 | Stop reason: HOSPADM

## 2018-01-01 RX ORDER — POTASSIUM CL/LIDO/0.9 % NACL 10MEQ/0.1L
10 INTRAVENOUS SOLUTION, PIGGYBACK (ML) INTRAVENOUS
Status: DISCONTINUED | OUTPATIENT
Start: 2018-01-01 | End: 2018-01-01 | Stop reason: RX

## 2018-01-01 RX ORDER — ONDANSETRON 2 MG/ML
4 INJECTION INTRAMUSCULAR; INTRAVENOUS
Status: COMPLETED | OUTPATIENT
Start: 2018-01-01 | End: 2018-01-01

## 2018-01-01 RX ORDER — CIPROFLOXACIN 500 MG/1
500 TABLET, FILM COATED ORAL 2 TIMES DAILY
Qty: 4 TABLET | Refills: 0 | Status: SHIPPED | OUTPATIENT
Start: 2018-01-01 | End: 2019-01-01

## 2018-01-01 RX ORDER — SPIRONOLACTONE 50 MG/1
50 TABLET, FILM COATED ORAL DAILY
Status: DISCONTINUED | OUTPATIENT
Start: 2018-01-01 | End: 2018-01-01 | Stop reason: HOSPADM

## 2018-01-01 RX ORDER — LORAZEPAM 2 MG/ML
2 INJECTION INTRAMUSCULAR ONCE
Status: COMPLETED | OUTPATIENT
Start: 2018-01-01 | End: 2018-01-01

## 2018-01-01 RX ORDER — LACTULOSE 10 G/15ML
20 SOLUTION ORAL ONCE
Status: COMPLETED | OUTPATIENT
Start: 2018-01-01 | End: 2018-01-01

## 2018-01-01 RX ORDER — LACTULOSE 10 G/15ML
10 SOLUTION ORAL DAILY
Qty: 946 ML | Refills: 3 | Status: ON HOLD | OUTPATIENT
Start: 2018-01-01 | End: 2018-01-01

## 2018-01-01 RX ORDER — MIRTAZAPINE 45 MG/1
45 TABLET, FILM COATED ORAL AT BEDTIME
Qty: 30 TABLET | Refills: 2 | Status: SHIPPED | OUTPATIENT
Start: 2018-01-01 | End: 2018-01-01

## 2018-01-01 RX ORDER — DEXAMETHASONE SODIUM PHOSPHATE 4 MG/ML
INJECTION, SOLUTION INTRA-ARTICULAR; INTRALESIONAL; INTRAMUSCULAR; INTRAVENOUS; SOFT TISSUE PRN
Status: DISCONTINUED | OUTPATIENT
Start: 2018-01-01 | End: 2018-01-01

## 2018-01-01 RX ORDER — SODIUM CHLORIDE, SODIUM LACTATE, POTASSIUM CHLORIDE, CALCIUM CHLORIDE 600; 310; 30; 20 MG/100ML; MG/100ML; MG/100ML; MG/100ML
INJECTION, SOLUTION INTRAVENOUS CONTINUOUS
Status: DISCONTINUED | OUTPATIENT
Start: 2018-01-01 | End: 2018-01-01

## 2018-01-01 RX ORDER — LACTULOSE 10 G/15ML
10 SOLUTION ORAL 3 TIMES DAILY PRN
Status: DISCONTINUED | OUTPATIENT
Start: 2018-01-01 | End: 2018-01-01 | Stop reason: HOSPADM

## 2018-01-01 RX ORDER — SODIUM CHLORIDE 9 MG/ML
INJECTION, SOLUTION INTRAVENOUS CONTINUOUS PRN
Status: DISCONTINUED | OUTPATIENT
Start: 2018-01-01 | End: 2018-01-01

## 2018-01-01 RX ORDER — METHYLPHENIDATE HYDROCHLORIDE 10 MG/1
10 TABLET ORAL 2 TIMES DAILY
Qty: 60 TABLET | Refills: 0 | Status: ON HOLD | OUTPATIENT
Start: 2018-01-01 | End: 2018-01-01

## 2018-01-01 RX ORDER — NALOXONE HYDROCHLORIDE 0.4 MG/ML
.1-.4 INJECTION, SOLUTION INTRAMUSCULAR; INTRAVENOUS; SUBCUTANEOUS
Status: CANCELLED | OUTPATIENT
Start: 2018-01-01 | End: 2018-01-01

## 2018-01-01 RX ORDER — SPIRONOLACTONE 25 MG/1
25 TABLET ORAL DAILY
Status: DISCONTINUED | OUTPATIENT
Start: 2018-01-01 | End: 2018-01-01 | Stop reason: HOSPADM

## 2018-01-01 RX ORDER — METHYLPHENIDATE HYDROCHLORIDE 10 MG/1
10 TABLET ORAL 2 TIMES DAILY
Qty: 14 TABLET | Refills: 0 | Status: SHIPPED | OUTPATIENT
Start: 2018-01-01 | End: 2019-01-01

## 2018-01-01 RX ORDER — TRAZODONE HYDROCHLORIDE 50 MG/1
50 TABLET, FILM COATED ORAL
Qty: 30 TABLET | Refills: 0 | Status: SHIPPED | OUTPATIENT
Start: 2018-01-01 | End: 2018-01-01

## 2018-01-01 RX ORDER — MICONAZOLE NITRATE 20 MG/G
CREAM TOPICAL 2 TIMES DAILY
Status: DISCONTINUED | OUTPATIENT
Start: 2018-01-01 | End: 2018-01-01 | Stop reason: HOSPADM

## 2018-01-01 RX ORDER — LEVOTHYROXINE SODIUM 88 UG/1
88 TABLET ORAL
Status: DISCONTINUED | OUTPATIENT
Start: 2018-01-01 | End: 2018-01-01 | Stop reason: HOSPADM

## 2018-01-01 RX ORDER — POTASSIUM CHLORIDE 750 MG/1
40 TABLET, EXTENDED RELEASE ORAL ONCE
Status: COMPLETED | OUTPATIENT
Start: 2018-01-01 | End: 2018-01-01

## 2018-01-01 RX ORDER — ALBUMIN (HUMAN) 12.5 G/50ML
75 SOLUTION INTRAVENOUS DAILY
Status: COMPLETED | OUTPATIENT
Start: 2018-01-01 | End: 2018-01-01

## 2018-01-01 RX ORDER — TRAZODONE HYDROCHLORIDE 50 MG/1
50 TABLET, FILM COATED ORAL
Qty: 30 TABLET | Refills: 2 | Status: SHIPPED | OUTPATIENT
Start: 2018-01-01 | End: 2019-01-01

## 2018-01-01 RX ORDER — LACTULOSE 10 G/15ML
100 SOLUTION ORAL
Status: DISCONTINUED | OUTPATIENT
Start: 2018-01-01 | End: 2018-01-01 | Stop reason: HOSPADM

## 2018-01-01 RX ORDER — LEVOTHYROXINE SODIUM 88 UG/1
88 TABLET ORAL DAILY
Qty: 90 TABLET | Refills: 1 | Status: SHIPPED | OUTPATIENT
Start: 2018-01-01 | End: 2019-01-01

## 2018-01-01 RX ORDER — FUROSEMIDE 20 MG
20 TABLET ORAL DAILY
Qty: 30 TABLET | Refills: 3 | Status: SHIPPED | OUTPATIENT
Start: 2018-01-01 | End: 2018-01-01

## 2018-01-01 RX ORDER — SPIRONOLACTONE 50 MG/1
50 TABLET, FILM COATED ORAL DAILY
Status: CANCELLED | OUTPATIENT
Start: 2018-01-01

## 2018-01-01 RX ORDER — DEXAMETHASONE SODIUM PHOSPHATE 4 MG/ML
4 INJECTION, SOLUTION INTRA-ARTICULAR; INTRALESIONAL; INTRAMUSCULAR; INTRAVENOUS; SOFT TISSUE EVERY 10 MIN PRN
Status: DISCONTINUED | OUTPATIENT
Start: 2018-01-01 | End: 2018-01-01 | Stop reason: HOSPADM

## 2018-01-01 RX ORDER — CEFTRIAXONE 1 G/1
1 INJECTION, POWDER, FOR SOLUTION INTRAMUSCULAR; INTRAVENOUS ONCE
Status: COMPLETED | OUTPATIENT
Start: 2018-01-01 | End: 2018-01-01

## 2018-01-01 RX ORDER — LIDOCAINE HYDROCHLORIDE 20 MG/ML
INJECTION, SOLUTION INFILTRATION; PERINEURAL PRN
Status: DISCONTINUED | OUTPATIENT
Start: 2018-01-01 | End: 2018-01-01

## 2018-01-01 RX ORDER — SODIUM CHLORIDE 9 MG/ML
INJECTION, SOLUTION INTRAVENOUS CONTINUOUS
Status: DISCONTINUED | OUTPATIENT
Start: 2018-01-01 | End: 2018-01-01 | Stop reason: ALTCHOICE

## 2018-01-01 RX ORDER — CICLOPIROX OLAMINE 7.7 MG/G
CREAM TOPICAL 2 TIMES DAILY
Status: DISCONTINUED | OUTPATIENT
Start: 2018-01-01 | End: 2018-01-01 | Stop reason: HOSPADM

## 2018-01-01 RX ORDER — HYDROMORPHONE HYDROCHLORIDE 1 MG/ML
.3-.5 INJECTION, SOLUTION INTRAMUSCULAR; INTRAVENOUS; SUBCUTANEOUS EVERY 10 MIN PRN
Status: DISCONTINUED | OUTPATIENT
Start: 2018-01-01 | End: 2018-01-01 | Stop reason: HOSPADM

## 2018-01-01 RX ORDER — POTASSIUM CHLORIDE 29.8 MG/ML
20 INJECTION INTRAVENOUS
Status: DISCONTINUED | OUTPATIENT
Start: 2018-01-01 | End: 2018-01-01 | Stop reason: HOSPADM

## 2018-01-01 RX ORDER — SODIUM CHLORIDE, SODIUM LACTATE, POTASSIUM CHLORIDE, CALCIUM CHLORIDE 600; 310; 30; 20 MG/100ML; MG/100ML; MG/100ML; MG/100ML
INJECTION, SOLUTION INTRAVENOUS CONTINUOUS PRN
Status: DISCONTINUED | OUTPATIENT
Start: 2018-01-01 | End: 2018-01-01

## 2018-01-01 RX ORDER — GABAPENTIN 100 MG/1
100 CAPSULE ORAL AT BEDTIME
Qty: 30 CAPSULE | Refills: 3 | Status: SHIPPED | OUTPATIENT
Start: 2018-01-01 | End: 2018-01-01

## 2018-01-01 RX ORDER — EPHEDRINE SULFATE 50 MG/ML
INJECTION, SOLUTION INTRAMUSCULAR; INTRAVENOUS; SUBCUTANEOUS PRN
Status: DISCONTINUED | OUTPATIENT
Start: 2018-01-01 | End: 2018-01-01

## 2018-01-01 RX ORDER — IOPAMIDOL 755 MG/ML
58 INJECTION, SOLUTION INTRAVASCULAR ONCE
Status: COMPLETED | OUTPATIENT
Start: 2018-01-01 | End: 2018-01-01

## 2018-01-01 RX ORDER — ALBUTEROL SULFATE 0.83 MG/ML
2.5 SOLUTION RESPIRATORY (INHALATION) ONCE
Status: COMPLETED | OUTPATIENT
Start: 2018-01-01 | End: 2018-01-01

## 2018-01-01 RX ORDER — LACTULOSE 10 G/15ML
20 SOLUTION ORAL
Status: DISCONTINUED | OUTPATIENT
Start: 2018-01-01 | End: 2018-01-01 | Stop reason: HOSPADM

## 2018-01-01 RX ORDER — MIRTAZAPINE 45 MG/1
45 TABLET, FILM COATED ORAL AT BEDTIME
Qty: 30 TABLET | Refills: 2 | Status: SHIPPED | OUTPATIENT
Start: 2018-01-01 | End: 2019-01-01

## 2018-01-01 RX ORDER — MIRTAZAPINE 15 MG/1
45 TABLET, FILM COATED ORAL AT BEDTIME
Status: DISCONTINUED | OUTPATIENT
Start: 2018-01-01 | End: 2018-01-01 | Stop reason: HOSPADM

## 2018-01-01 RX ORDER — GABAPENTIN 100 MG/1
100 CAPSULE ORAL AT BEDTIME
Qty: 30 CAPSULE | Refills: 3 | Status: SHIPPED | OUTPATIENT
Start: 2018-01-01 | End: 2019-01-01

## 2018-01-01 RX ORDER — POTASSIUM CHLORIDE 750 MG/1
60 TABLET, EXTENDED RELEASE ORAL
Status: DISPENSED | OUTPATIENT
Start: 2018-01-01 | End: 2018-01-01

## 2018-01-01 RX ORDER — FENTANYL CITRATE 50 UG/ML
INJECTION, SOLUTION INTRAMUSCULAR; INTRAVENOUS PRN
Status: DISCONTINUED | OUTPATIENT
Start: 2018-01-01 | End: 2018-01-01

## 2018-01-01 RX ORDER — METHYLPHENIDATE HYDROCHLORIDE 10 MG/1
10 TABLET ORAL 2 TIMES DAILY
Qty: 60 TABLET | Refills: 0 | Status: SHIPPED | OUTPATIENT
Start: 2018-01-01 | End: 2019-01-01

## 2018-01-01 RX ORDER — CEFTRIAXONE 1 G/1
1 INJECTION, POWDER, FOR SOLUTION INTRAMUSCULAR; INTRAVENOUS EVERY 24 HOURS
Status: COMPLETED | OUTPATIENT
Start: 2018-01-01 | End: 2018-01-01

## 2018-01-01 RX ORDER — ZINC SULFATE 50(220)MG
220 CAPSULE ORAL DAILY
Status: DISCONTINUED | OUTPATIENT
Start: 2018-01-01 | End: 2018-01-01 | Stop reason: HOSPADM

## 2018-01-01 RX ORDER — POTASSIUM CHLORIDE 7.45 MG/ML
10 INJECTION INTRAVENOUS
Status: DISCONTINUED | OUTPATIENT
Start: 2018-01-01 | End: 2018-01-01 | Stop reason: HOSPADM

## 2018-01-01 RX ORDER — PROPOFOL 10 MG/ML
INJECTION, EMULSION INTRAVENOUS PRN
Status: DISCONTINUED | OUTPATIENT
Start: 2018-01-01 | End: 2018-01-01

## 2018-01-01 RX ORDER — ALBUMIN (HUMAN) 12.5 G/50ML
50 SOLUTION INTRAVENOUS ONCE
Status: DISCONTINUED | OUTPATIENT
Start: 2018-01-01 | End: 2018-01-01

## 2018-01-01 RX ORDER — LIDOCAINE 40 MG/G
CREAM TOPICAL
Status: DISCONTINUED | OUTPATIENT
Start: 2018-01-01 | End: 2018-01-01 | Stop reason: CLARIF

## 2018-01-01 RX ORDER — HYDROXYZINE HYDROCHLORIDE 25 MG/1
25 TABLET, FILM COATED ORAL EVERY EVENING
Status: DISCONTINUED | OUTPATIENT
Start: 2018-01-01 | End: 2018-01-01 | Stop reason: HOSPADM

## 2018-01-01 RX ORDER — FUROSEMIDE 20 MG
20 TABLET ORAL DAILY
Status: CANCELLED | OUTPATIENT
Start: 2018-01-01

## 2018-01-01 RX ORDER — ONDANSETRON 4 MG/1
4 TABLET, ORALLY DISINTEGRATING ORAL EVERY 6 HOURS PRN
Status: DISCONTINUED | OUTPATIENT
Start: 2018-01-01 | End: 2018-01-01 | Stop reason: HOSPADM

## 2018-01-01 RX ORDER — FENTANYL CITRATE 50 UG/ML
25-50 INJECTION, SOLUTION INTRAMUSCULAR; INTRAVENOUS
Status: DISCONTINUED | OUTPATIENT
Start: 2018-01-01 | End: 2018-01-01 | Stop reason: HOSPADM

## 2018-01-01 RX ORDER — ALBUMIN (HUMAN) 12.5 G/50ML
25 SOLUTION INTRAVENOUS ONCE
Status: COMPLETED | OUTPATIENT
Start: 2018-01-01 | End: 2018-01-01

## 2018-01-01 RX ORDER — HYDROXYZINE HYDROCHLORIDE 25 MG/1
25 TABLET, FILM COATED ORAL 2 TIMES DAILY PRN
Status: DISCONTINUED | OUTPATIENT
Start: 2018-01-01 | End: 2018-01-01 | Stop reason: HOSPADM

## 2018-01-01 RX ORDER — CEPHALEXIN 500 MG/1
500 CAPSULE ORAL 4 TIMES DAILY
Qty: 28 CAPSULE | Refills: 0 | Status: SHIPPED | OUTPATIENT
Start: 2018-01-01 | End: 2018-01-01

## 2018-01-01 RX ORDER — METHYLPHENIDATE HYDROCHLORIDE 5 MG/1
10 TABLET ORAL 2 TIMES DAILY
Status: DISCONTINUED | OUTPATIENT
Start: 2018-01-01 | End: 2018-01-01 | Stop reason: HOSPADM

## 2018-01-01 RX ORDER — LEVOTHYROXINE SODIUM 88 UG/1
TABLET ORAL
Qty: 90 TABLET | Refills: 1 | OUTPATIENT
Start: 2018-01-01

## 2018-01-01 RX ORDER — LACTULOSE 10 G/15ML
20 SOLUTION ORAL 3 TIMES DAILY
Status: DISCONTINUED | OUTPATIENT
Start: 2018-01-01 | End: 2018-01-01

## 2018-01-01 RX ORDER — HYDROMORPHONE HYDROCHLORIDE 1 MG/ML
0.5 INJECTION, SOLUTION INTRAMUSCULAR; INTRAVENOUS; SUBCUTANEOUS
Status: DISCONTINUED | OUTPATIENT
Start: 2018-01-01 | End: 2018-01-01 | Stop reason: HOSPADM

## 2018-01-01 RX ORDER — CEFTRIAXONE 2 G/1
2 INJECTION, POWDER, FOR SOLUTION INTRAMUSCULAR; INTRAVENOUS EVERY 24 HOURS
Qty: 180 ML | Refills: 0 | Status: SHIPPED | OUTPATIENT
Start: 2018-01-01 | End: 2019-01-01

## 2018-01-01 RX ORDER — HYDROCODONE BITARTRATE AND ACETAMINOPHEN 5; 325 MG/1; MG/1
2 TABLET ORAL EVERY 6 HOURS PRN
Status: DISCONTINUED | OUTPATIENT
Start: 2018-01-01 | End: 2018-01-01 | Stop reason: HOSPADM

## 2018-01-01 RX ORDER — TRAZODONE HYDROCHLORIDE 50 MG/1
50 TABLET, FILM COATED ORAL
Qty: 30 TABLET | Refills: 2 | Status: SHIPPED | OUTPATIENT
Start: 2018-01-01 | End: 2018-01-01

## 2018-01-01 RX ORDER — LEVOFLOXACIN 500 MG/1
500 TABLET, FILM COATED ORAL DAILY
Qty: 7 TABLET | Refills: 0 | Status: SHIPPED | OUTPATIENT
Start: 2018-01-01 | End: 2018-01-01

## 2018-01-01 RX ORDER — LACTULOSE 10 G/15ML
20 SOLUTION ORAL
Status: DISCONTINUED | OUTPATIENT
Start: 2018-01-01 | End: 2018-01-01

## 2018-01-01 RX ORDER — MAGNESIUM SULFATE HEPTAHYDRATE 40 MG/ML
4 INJECTION, SOLUTION INTRAVENOUS EVERY 4 HOURS PRN
Status: DISCONTINUED | OUTPATIENT
Start: 2018-01-01 | End: 2018-01-01 | Stop reason: HOSPADM

## 2018-01-01 RX ORDER — KIT FOR THE PREPARATION OF TECHNETIUM TC 99M MEBROFENIN 45 MG/10ML
4.8-7.2 INJECTION, POWDER, LYOPHILIZED, FOR SOLUTION INTRAVENOUS ONCE
Status: COMPLETED | OUTPATIENT
Start: 2018-01-01 | End: 2018-01-01

## 2018-01-01 RX ORDER — LACTULOSE 10 G/15ML
20 SOLUTION ORAL EVERY 4 HOURS
Status: DISCONTINUED | OUTPATIENT
Start: 2018-01-01 | End: 2018-01-01

## 2018-01-01 RX ORDER — THIAMINE HYDROCHLORIDE 100 MG/ML
100 INJECTION, SOLUTION INTRAMUSCULAR; INTRAVENOUS DAILY
Status: DISCONTINUED | OUTPATIENT
Start: 2018-01-01 | End: 2018-01-01 | Stop reason: HOSPADM

## 2018-01-01 RX ORDER — HYDROXYZINE HYDROCHLORIDE 25 MG/1
25 TABLET, FILM COATED ORAL 2 TIMES DAILY PRN
Qty: 60 TABLET | Refills: 0 | DISCHARGE
Start: 2018-01-01 | End: 2019-01-01

## 2018-01-01 RX ORDER — POTASSIUM CHLORIDE 750 MG/1
40 TABLET, EXTENDED RELEASE ORAL DAILY
Status: DISCONTINUED | OUTPATIENT
Start: 2018-01-01 | End: 2018-01-01

## 2018-01-01 RX ORDER — LACTULOSE 10 G/15ML
5 SOLUTION ORAL ONCE
Status: COMPLETED | OUTPATIENT
Start: 2018-01-01 | End: 2018-01-01

## 2018-01-01 RX ORDER — TRAZODONE HYDROCHLORIDE 50 MG/1
50 TABLET, FILM COATED ORAL
Status: DISCONTINUED | OUTPATIENT
Start: 2018-01-01 | End: 2018-01-01

## 2018-01-01 RX ORDER — LANOLIN ALCOHOL/MO/W.PET/CERES
100 CREAM (GRAM) TOPICAL DAILY
Status: DISCONTINUED | OUTPATIENT
Start: 2018-01-01 | End: 2018-01-01

## 2018-01-01 RX ORDER — HYDROXYZINE HYDROCHLORIDE 25 MG/1
25 TABLET, FILM COATED ORAL 2 TIMES DAILY
Status: DISCONTINUED | OUTPATIENT
Start: 2018-01-01 | End: 2018-01-01 | Stop reason: HOSPADM

## 2018-01-01 RX ORDER — CEFTRIAXONE 2 G/1
2 INJECTION, POWDER, FOR SOLUTION INTRAMUSCULAR; INTRAVENOUS EVERY 24 HOURS
Status: DISCONTINUED | OUTPATIENT
Start: 2018-01-01 | End: 2018-01-01 | Stop reason: HOSPADM

## 2018-01-01 RX ORDER — SODIUM CHLORIDE, SODIUM LACTATE, POTASSIUM CHLORIDE, CALCIUM CHLORIDE 600; 310; 30; 20 MG/100ML; MG/100ML; MG/100ML; MG/100ML
1000 INJECTION, SOLUTION INTRAVENOUS CONTINUOUS
Status: DISCONTINUED | OUTPATIENT
Start: 2018-01-01 | End: 2018-01-01

## 2018-01-01 RX ORDER — HYDRALAZINE HYDROCHLORIDE 20 MG/ML
2.5-5 INJECTION INTRAMUSCULAR; INTRAVENOUS EVERY 10 MIN PRN
Status: DISCONTINUED | OUTPATIENT
Start: 2018-01-01 | End: 2018-01-01 | Stop reason: HOSPADM

## 2018-01-01 RX ORDER — LEVETIRACETAM 750 MG/1
750 TABLET ORAL 2 TIMES DAILY
Qty: 60 TABLET | Refills: 3 | DISCHARGE
Start: 2018-01-01 | End: 2019-01-01

## 2018-01-01 RX ORDER — CIPROFLOXACIN 500 MG/1
500 TABLET, FILM COATED ORAL 2 TIMES DAILY
Status: DISCONTINUED | OUTPATIENT
Start: 2018-01-01 | End: 2018-01-01 | Stop reason: HOSPADM

## 2018-01-01 RX ORDER — ACETAMINOPHEN 325 MG/1
650 TABLET ORAL EVERY 8 HOURS PRN
Status: DISCONTINUED | OUTPATIENT
Start: 2018-01-01 | End: 2018-01-01 | Stop reason: HOSPADM

## 2018-01-01 RX ORDER — ALBUMIN (HUMAN) 12.5 G/50ML
50 SOLUTION INTRAVENOUS ONCE
Status: COMPLETED | OUTPATIENT
Start: 2018-01-01 | End: 2018-01-01

## 2018-01-01 RX ORDER — FLUMAZENIL 0.1 MG/ML
0.2 INJECTION, SOLUTION INTRAVENOUS
Status: CANCELLED | OUTPATIENT
Start: 2018-01-01 | End: 2018-01-01

## 2018-01-01 RX ORDER — MULTIPLE VITAMINS W/ MINERALS TAB 9MG-400MCG
1 TAB ORAL 2 TIMES DAILY WITH MEALS
Status: DISCONTINUED | OUTPATIENT
Start: 2018-01-01 | End: 2018-01-01 | Stop reason: HOSPADM

## 2018-01-01 RX ORDER — CARBAMAZEPINE 200 MG/1
200 TABLET ORAL 2 TIMES DAILY
Status: DISCONTINUED | OUTPATIENT
Start: 2018-01-01 | End: 2018-01-01

## 2018-01-01 RX ORDER — LIDOCAINE 40 MG/G
CREAM TOPICAL
Status: CANCELLED | OUTPATIENT
Start: 2018-01-01

## 2018-01-01 RX ORDER — PROCHLORPERAZINE MALEATE 5 MG
10 TABLET ORAL EVERY 6 HOURS PRN
Status: DISCONTINUED | OUTPATIENT
Start: 2018-01-01 | End: 2018-01-01 | Stop reason: HOSPADM

## 2018-01-01 RX ORDER — AMOXICILLIN AND CLAVULANATE POTASSIUM 562.5; 437.5; 62.5 MG/1; MG/1; MG/1
2 TABLET, MULTILAYER, EXTENDED RELEASE ORAL EVERY 12 HOURS SCHEDULED
Status: DISCONTINUED | OUTPATIENT
Start: 2018-01-01 | End: 2018-01-01

## 2018-01-01 RX ORDER — SPIRONOLACTONE 25 MG/1
25 TABLET ORAL DAILY
Qty: 60 TABLET | Refills: 0 | DISCHARGE
Start: 2018-01-01 | End: 2019-01-01

## 2018-01-01 RX ORDER — RIFAXIMIN 550 MG/1
TABLET ORAL
Qty: 60 TABLET | Refills: 11 | Status: SHIPPED | OUTPATIENT
Start: 2018-01-01 | End: 2019-01-01

## 2018-01-01 RX ORDER — IBUPROFEN 200 MG
600 TABLET ORAL EVERY 6 HOURS PRN
Status: DISCONTINUED | OUTPATIENT
Start: 2018-01-01 | End: 2018-01-01

## 2018-01-01 RX ORDER — SPIRONOLACTONE 25 MG/1
50 TABLET ORAL DAILY
Qty: 180 TABLET | Refills: 1 | Status: ON HOLD | OUTPATIENT
Start: 2018-01-01 | End: 2018-01-01

## 2018-01-01 RX ORDER — ALBUTEROL SULFATE 90 UG/1
AEROSOL, METERED RESPIRATORY (INHALATION) PRN
Status: DISCONTINUED | OUTPATIENT
Start: 2018-01-01 | End: 2018-01-01

## 2018-01-01 RX ORDER — PROCHLORPERAZINE 25 MG
25 SUPPOSITORY, RECTAL RECTAL EVERY 12 HOURS PRN
Status: DISCONTINUED | OUTPATIENT
Start: 2018-01-01 | End: 2018-01-01

## 2018-01-01 RX ORDER — METHYLPHENIDATE HYDROCHLORIDE 10 MG/1
TABLET ORAL
Qty: 60 TABLET | Refills: 0 | Status: SHIPPED | OUTPATIENT
Start: 2018-01-01 | End: 2018-01-01

## 2018-01-01 RX ORDER — HYDROXYZINE HYDROCHLORIDE 25 MG/1
25 TABLET, FILM COATED ORAL 2 TIMES DAILY
Qty: 180 TABLET | Refills: 1 | Status: ON HOLD | OUTPATIENT
Start: 2018-01-01 | End: 2018-01-01

## 2018-01-01 RX ORDER — HEPARIN SODIUM,PORCINE 10 UNIT/ML
3-6 VIAL (ML) INTRAVENOUS
Status: DISCONTINUED | OUTPATIENT
Start: 2018-01-01 | End: 2018-01-01 | Stop reason: HOSPADM

## 2018-01-01 RX ORDER — IOPAMIDOL 755 MG/ML
118 INJECTION, SOLUTION INTRAVASCULAR ONCE
Status: COMPLETED | OUTPATIENT
Start: 2018-01-01 | End: 2018-01-01

## 2018-01-01 RX ORDER — LEVOFLOXACIN 500 MG/1
500 TABLET, FILM COATED ORAL DAILY
Qty: 7 TABLET | Refills: 0 | Status: ON HOLD | OUTPATIENT
Start: 2018-01-01 | End: 2018-01-01

## 2018-01-01 RX ORDER — GABAPENTIN 100 MG/1
100 CAPSULE ORAL AT BEDTIME
Status: DISCONTINUED | OUTPATIENT
Start: 2018-01-01 | End: 2018-01-01

## 2018-01-01 RX ORDER — ACETAMINOPHEN 650 MG/1
650 SUPPOSITORY RECTAL EVERY 6 HOURS PRN
Status: DISCONTINUED | OUTPATIENT
Start: 2018-01-01 | End: 2018-01-01 | Stop reason: HOSPADM

## 2018-01-01 RX ORDER — LEVETIRACETAM 750 MG/1
750 TABLET ORAL 2 TIMES DAILY
Status: DISCONTINUED | OUTPATIENT
Start: 2018-01-01 | End: 2018-01-01 | Stop reason: HOSPADM

## 2018-01-01 RX ORDER — AMOXICILLIN 250 MG
2 CAPSULE ORAL 2 TIMES DAILY
Status: DISCONTINUED | OUTPATIENT
Start: 2018-01-01 | End: 2018-01-01

## 2018-01-01 RX ORDER — ACETAMINOPHEN 325 MG/1
650 TABLET ORAL EVERY 8 HOURS PRN
COMMUNITY
Start: 2018-01-01 | End: 2019-01-01

## 2018-01-01 RX ORDER — DIPHENHYDRAMINE HYDROCHLORIDE, ZINC ACETATE 2; .1 G/100G; G/100G
CREAM TOPICAL 3 TIMES DAILY PRN
Status: DISCONTINUED | OUTPATIENT
Start: 2018-01-01 | End: 2018-01-01 | Stop reason: HOSPADM

## 2018-01-01 RX ORDER — ONDANSETRON 4 MG/1
4 TABLET, ORALLY DISINTEGRATING ORAL EVERY 6 HOURS PRN
Qty: 40 TABLET | Refills: 3 | DISCHARGE
Start: 2018-01-01 | End: 2018-01-01

## 2018-01-01 RX ORDER — DIPHENHYDRAMINE HYDROCHLORIDE 50 MG/ML
INJECTION INTRAMUSCULAR; INTRAVENOUS
Status: COMPLETED
Start: 2018-01-01 | End: 2018-01-01

## 2018-01-01 RX ORDER — LABETALOL HYDROCHLORIDE 5 MG/ML
10 INJECTION, SOLUTION INTRAVENOUS
Status: DISCONTINUED | OUTPATIENT
Start: 2018-01-01 | End: 2018-01-01 | Stop reason: HOSPADM

## 2018-01-01 RX ORDER — HYDROCODONE BITARTRATE AND ACETAMINOPHEN 5; 325 MG/1; MG/1
2 TABLET ORAL EVERY 6 HOURS PRN
Status: CANCELLED | OUTPATIENT
Start: 2018-01-01

## 2018-01-01 RX ORDER — FOLIC ACID 1 MG/1
1 TABLET ORAL DAILY
Qty: 30 TABLET | Refills: 0 | Status: SHIPPED | OUTPATIENT
Start: 2018-01-01 | End: 2019-01-01

## 2018-01-01 RX ORDER — PIPERACILLIN SODIUM, TAZOBACTAM SODIUM 4; .5 G/20ML; G/20ML
4.5 INJECTION, POWDER, LYOPHILIZED, FOR SOLUTION INTRAVENOUS EVERY 6 HOURS
Status: DISCONTINUED | OUTPATIENT
Start: 2018-01-01 | End: 2018-01-01

## 2018-01-01 RX ORDER — LACTULOSE 10 G/15ML
40 SOLUTION ORAL ONCE
Status: COMPLETED | OUTPATIENT
Start: 2018-01-01 | End: 2018-01-01

## 2018-01-01 RX ORDER — PHYTONADIONE 5 MG/1
10 TABLET ORAL DAILY
Status: COMPLETED | OUTPATIENT
Start: 2018-01-01 | End: 2018-01-01

## 2018-01-01 RX ORDER — LEVOFLOXACIN 250 MG/1
500 TABLET, FILM COATED ORAL DAILY
Status: DISCONTINUED | OUTPATIENT
Start: 2018-01-01 | End: 2018-01-01

## 2018-01-01 RX ORDER — LACTULOSE 10 G/15ML
20 SOLUTION ORAL 2 TIMES DAILY
Status: DISCONTINUED | OUTPATIENT
Start: 2018-01-01 | End: 2018-01-01 | Stop reason: HOSPADM

## 2018-01-01 RX ORDER — SPIRONOLACTONE 25 MG/1
25 TABLET ORAL DAILY
Status: DISCONTINUED | OUTPATIENT
Start: 2018-01-01 | End: 2018-01-01

## 2018-01-01 RX ORDER — NALOXONE HYDROCHLORIDE 0.4 MG/ML
.1-.4 INJECTION, SOLUTION INTRAMUSCULAR; INTRAVENOUS; SUBCUTANEOUS
Status: DISCONTINUED | OUTPATIENT
Start: 2018-01-01 | End: 2018-01-01

## 2018-01-01 RX ORDER — GADOBUTROL 604.72 MG/ML
10 INJECTION INTRAVENOUS ONCE
Status: COMPLETED | OUTPATIENT
Start: 2018-01-01 | End: 2018-01-01

## 2018-01-01 RX ORDER — TRAZODONE HYDROCHLORIDE 50 MG/1
50 TABLET, FILM COATED ORAL
Status: DISCONTINUED | OUTPATIENT
Start: 2018-01-01 | End: 2018-01-01 | Stop reason: HOSPADM

## 2018-01-01 RX ORDER — HEPARIN SODIUM,PORCINE 10 UNIT/ML
5-10 VIAL (ML) INTRAVENOUS
Status: DISCONTINUED | OUTPATIENT
Start: 2018-01-01 | End: 2018-01-01 | Stop reason: CLARIF

## 2018-01-01 RX ORDER — LIDOCAINE HYDROCHLORIDE 10 MG/ML
10 INJECTION, SOLUTION EPIDURAL; INFILTRATION; INTRACAUDAL; PERINEURAL ONCE
Status: DISCONTINUED | OUTPATIENT
Start: 2018-01-01 | End: 2018-01-01 | Stop reason: HOSPADM

## 2018-01-01 RX ADMIN — LACTULOSE 60 ML: 10 SOLUTION ORAL at 09:14

## 2018-01-01 RX ADMIN — CIPROFLOXACIN HYDROCHLORIDE 500 MG: 500 TABLET, FILM COATED ORAL at 10:01

## 2018-01-01 RX ADMIN — SODIUM CHLORIDE, PRESERVATIVE FREE 5 ML: 5 INJECTION INTRAVENOUS at 09:15

## 2018-01-01 RX ADMIN — CARBAMAZEPINE 200 MG: 200 TABLET ORAL at 09:23

## 2018-01-01 RX ADMIN — LEVETIRACETAM 750 MG: 750 TABLET, FILM COATED ORAL at 09:51

## 2018-01-01 RX ADMIN — MIRTAZAPINE 45 MG: 30 TABLET, FILM COATED ORAL at 21:40

## 2018-01-01 RX ADMIN — FENTANYL CITRATE 25 MCG: 50 INJECTION, SOLUTION INTRAMUSCULAR; INTRAVENOUS at 12:10

## 2018-01-01 RX ADMIN — SODIUM CHLORIDE, PRESERVATIVE FREE 5 ML: 5 INJECTION INTRAVENOUS at 12:16

## 2018-01-01 RX ADMIN — LEVOTHYROXINE SODIUM 88 MCG: 88 TABLET ORAL at 11:15

## 2018-01-01 RX ADMIN — MEBROFENIN 6 MILLICURIE: 45 INJECTION, POWDER, LYOPHILIZED, FOR SOLUTION INTRAVENOUS at 14:16

## 2018-01-01 RX ADMIN — Medication 100 MG: at 09:17

## 2018-01-01 RX ADMIN — CICLOPIROX OLAMINE: 7.7 CREAM TOPICAL at 09:02

## 2018-01-01 RX ADMIN — LACTULOSE 20 G: 20 SOLUTION ORAL at 08:11

## 2018-01-01 RX ADMIN — METHYLPHENIDATE HYDROCHLORIDE 10 MG: 10 TABLET ORAL at 08:38

## 2018-01-01 RX ADMIN — MULTIPLE VITAMINS W/ MINERALS TAB 1 TABLET: TAB at 12:55

## 2018-01-01 RX ADMIN — FERROUS SULFATE TAB 325 MG (65 MG ELEMENTAL FE) 325 MG: 325 (65 FE) TAB at 19:52

## 2018-01-01 RX ADMIN — LACTULOSE 20 G: 20 SOLUTION ORAL at 15:35

## 2018-01-01 RX ADMIN — RIFAXIMIN 550 MG: 550 TABLET ORAL at 08:28

## 2018-01-01 RX ADMIN — RIFAXIMIN 550 MG: 550 TABLET ORAL at 19:52

## 2018-01-01 RX ADMIN — OMEPRAZOLE 40 MG: 20 CAPSULE, DELAYED RELEASE ORAL at 08:31

## 2018-01-01 RX ADMIN — OMEPRAZOLE 40 MG: 20 CAPSULE, DELAYED RELEASE ORAL at 09:18

## 2018-01-01 RX ADMIN — RIFAXIMIN 550 MG: 550 TABLET ORAL at 11:15

## 2018-01-01 RX ADMIN — FENTANYL CITRATE 75 MCG: 50 INJECTION, SOLUTION INTRAMUSCULAR; INTRAVENOUS at 12:00

## 2018-01-01 RX ADMIN — PROPOFOL 130 MG: 10 INJECTION, EMULSION INTRAVENOUS at 12:00

## 2018-01-01 RX ADMIN — METHYLPHENIDATE HYDROCHLORIDE 10 MG: 10 TABLET ORAL at 14:29

## 2018-01-01 RX ADMIN — FERROUS SULFATE TAB 325 MG (65 MG ELEMENTAL FE) 325 MG: 325 (65 FE) TAB at 09:26

## 2018-01-01 RX ADMIN — FERROUS SULFATE TAB 325 MG (65 MG ELEMENTAL FE) 325 MG: 325 (65 FE) TAB at 11:56

## 2018-01-01 RX ADMIN — RIFAXIMIN 550 MG: 550 TABLET ORAL at 09:17

## 2018-01-01 RX ADMIN — LACTULOSE 60 ML: 10 SOLUTION ORAL at 09:23

## 2018-01-01 RX ADMIN — FUROSEMIDE 20 MG: 20 TABLET ORAL at 08:24

## 2018-01-01 RX ADMIN — LACTULOSE 20 G: 20 SOLUTION ORAL at 16:47

## 2018-01-01 RX ADMIN — FOLIC ACID 1 MG: 1 TABLET ORAL at 08:28

## 2018-01-01 RX ADMIN — Medication 100 MG: at 08:28

## 2018-01-01 RX ADMIN — Medication 5 MG: at 12:44

## 2018-01-01 RX ADMIN — PHENYLEPHRINE HYDROCHLORIDE 100 MCG: 10 INJECTION, SOLUTION INTRAMUSCULAR; INTRAVENOUS; SUBCUTANEOUS at 13:27

## 2018-01-01 RX ADMIN — METHYLPHENIDATE HYDROCHLORIDE 10 MG: 10 TABLET ORAL at 20:55

## 2018-01-01 RX ADMIN — GABAPENTIN 100 MG: 100 CAPSULE ORAL at 01:39

## 2018-01-01 RX ADMIN — Medication 5 ML: at 05:55

## 2018-01-01 RX ADMIN — Medication 1000 MCG: at 08:38

## 2018-01-01 RX ADMIN — Medication 3 ML: at 17:18

## 2018-01-01 RX ADMIN — GABAPENTIN 100 MG: 100 CAPSULE ORAL at 22:04

## 2018-01-01 RX ADMIN — CYANOCOBALAMIN TAB 1000 MCG 1000 MCG: 1000 TAB at 09:38

## 2018-01-01 RX ADMIN — METHYLPHENIDATE HYDROCHLORIDE 10 MG: 10 TABLET ORAL at 09:17

## 2018-01-01 RX ADMIN — METHYLPHENIDATE HYDROCHLORIDE 10 MG: 10 TABLET ORAL at 18:52

## 2018-01-01 RX ADMIN — RIFAXIMIN 550 MG: 550 TABLET ORAL at 20:55

## 2018-01-01 RX ADMIN — FUROSEMIDE 20 MG: 20 TABLET ORAL at 10:52

## 2018-01-01 RX ADMIN — SPIRONOLACTONE 50 MG: 50 TABLET ORAL at 10:02

## 2018-01-01 RX ADMIN — EXTENDED PHENYTOIN SODIUM 120 MG: 30 CAPSULE ORAL at 09:16

## 2018-01-01 RX ADMIN — CALCIUM CARBONATE-VITAMIN D TAB 500 MG-200 UNIT 1 TABLET: 500-200 TAB at 09:33

## 2018-01-01 RX ADMIN — LEVOTHYROXINE SODIUM 88 MCG: 88 TABLET ORAL at 09:19

## 2018-01-01 RX ADMIN — SODIUM CHLORIDE, PRESERVATIVE FREE 5 ML: 5 INJECTION INTRAVENOUS at 14:08

## 2018-01-01 RX ADMIN — ACETAMINOPHEN 650 MG: 325 TABLET, FILM COATED ORAL at 20:33

## 2018-01-01 RX ADMIN — SPIRONOLACTONE 25 MG: 25 TABLET ORAL at 09:26

## 2018-01-01 RX ADMIN — PRAVASTATIN SODIUM 80 MG: 40 TABLET ORAL at 08:57

## 2018-01-01 RX ADMIN — FERROUS SULFATE TAB 325 MG (65 MG ELEMENTAL FE) 325 MG: 325 (65 FE) TAB at 22:22

## 2018-01-01 RX ADMIN — THIAMINE HYDROCHLORIDE 500 MG: 100 INJECTION, SOLUTION INTRAMUSCULAR; INTRAVENOUS at 22:28

## 2018-01-01 RX ADMIN — RIFAXIMIN 550 MG: 550 TABLET ORAL at 20:00

## 2018-01-01 RX ADMIN — FUROSEMIDE 20 MG: 20 TABLET ORAL at 12:23

## 2018-01-01 RX ADMIN — LEVETIRACETAM 750 MG: 750 TABLET, FILM COATED ORAL at 09:14

## 2018-01-01 RX ADMIN — ONDANSETRON 4 MG: 2 INJECTION INTRAMUSCULAR; INTRAVENOUS at 09:58

## 2018-01-01 RX ADMIN — HEPARIN SODIUM (PORCINE) LOCK FLUSH IV SOLN 100 UNIT/ML 5 ML: 100 SOLUTION at 12:40

## 2018-01-01 RX ADMIN — METHYLPHENIDATE HYDROCHLORIDE 10 MG: 10 TABLET ORAL at 09:21

## 2018-01-01 RX ADMIN — MICONAZOLE NITRATE: 2 POWDER TOPICAL at 20:51

## 2018-01-01 RX ADMIN — LACTULOSE 60 ML: 10 SOLUTION ORAL at 09:28

## 2018-01-01 RX ADMIN — CICLOPIROX OLAMINE: 7.7 CREAM TOPICAL at 09:18

## 2018-01-01 RX ADMIN — CALCIUM CARBONATE-VITAMIN D TAB 500 MG-200 UNIT 1 TABLET: 500-200 TAB at 13:09

## 2018-01-01 RX ADMIN — CYANOCOBALAMIN TAB 1000 MCG 1000 MCG: 1000 TAB at 09:52

## 2018-01-01 RX ADMIN — MICONAZOLE NITRATE: 20 CREAM TOPICAL at 08:32

## 2018-01-01 RX ADMIN — LACTULOSE 60 ML: 10 SOLUTION ORAL at 13:42

## 2018-01-01 RX ADMIN — CICLOPIROX OLAMINE: 7.7 CREAM TOPICAL at 20:00

## 2018-01-01 RX ADMIN — VITAMIN D, TAB 1000IU (100/BT) 1000 UNITS: 25 TAB at 08:01

## 2018-01-01 RX ADMIN — SPIRONOLACTONE 25 MG: 25 TABLET ORAL at 11:17

## 2018-01-01 RX ADMIN — LACTULOSE 20 G: 20 SOLUTION ORAL at 09:17

## 2018-01-01 RX ADMIN — CICLOPIROX OLAMINE: 7.7 CREAM TOPICAL at 08:13

## 2018-01-01 RX ADMIN — VITAMIN D, TAB 1000IU (100/BT) 1000 UNITS: 25 TAB at 09:17

## 2018-01-01 RX ADMIN — GABAPENTIN 100 MG: 100 CAPSULE ORAL at 21:39

## 2018-01-01 RX ADMIN — PHENYLEPHRINE HYDROCHLORIDE 100 MCG: 10 INJECTION, SOLUTION INTRAMUSCULAR; INTRAVENOUS; SUBCUTANEOUS at 12:11

## 2018-01-01 RX ADMIN — CARBAMAZEPINE 200 MG: 200 TABLET ORAL at 21:34

## 2018-01-01 RX ADMIN — MULTIPLE VITAMINS W/ MINERALS TAB 1 TABLET: TAB at 13:17

## 2018-01-01 RX ADMIN — FERROUS SULFATE TAB 325 MG (65 MG ELEMENTAL FE) 325 MG: 325 (65 FE) TAB at 21:39

## 2018-01-01 RX ADMIN — LACTULOSE 20 G: 10 SOLUTION ORAL at 08:01

## 2018-01-01 RX ADMIN — RIFAXIMIN 550 MG: 550 TABLET ORAL at 10:01

## 2018-01-01 RX ADMIN — CYANOCOBALAMIN TAB 1000 MCG 1000 MCG: 1000 TAB at 08:32

## 2018-01-01 RX ADMIN — LEVETIRACETAM 750 MG: 750 TABLET, FILM COATED ORAL at 09:38

## 2018-01-01 RX ADMIN — GABAPENTIN 100 MG: 100 CAPSULE ORAL at 21:40

## 2018-01-01 RX ADMIN — PHYTONADIONE 10 MG: 5 TABLET ORAL at 21:41

## 2018-01-01 RX ADMIN — POTASSIUM CHLORIDE 20 MEQ: 750 TABLET, EXTENDED RELEASE ORAL at 06:32

## 2018-01-01 RX ADMIN — SPIRONOLACTONE 50 MG: 50 TABLET ORAL at 08:11

## 2018-01-01 RX ADMIN — MULTIPLE VITAMINS W/ MINERALS TAB 1 TABLET: TAB at 17:11

## 2018-01-01 RX ADMIN — THIAMINE HYDROCHLORIDE 500 MG: 100 INJECTION, SOLUTION INTRAMUSCULAR; INTRAVENOUS at 20:42

## 2018-01-01 RX ADMIN — OMEPRAZOLE 40 MG: 20 CAPSULE, DELAYED RELEASE ORAL at 09:14

## 2018-01-01 RX ADMIN — GABAPENTIN 100 MG: 100 CAPSULE ORAL at 21:41

## 2018-01-01 RX ADMIN — LACTULOSE 20 G: 20 SOLUTION ORAL at 08:25

## 2018-01-01 RX ADMIN — OMEPRAZOLE 40 MG: 20 CAPSULE, DELAYED RELEASE ORAL at 08:11

## 2018-01-01 RX ADMIN — POTASSIUM CHLORIDE 20 MEQ: 750 TABLET, EXTENDED RELEASE ORAL at 09:17

## 2018-01-01 RX ADMIN — MIRTAZAPINE 45 MG: 30 TABLET, FILM COATED ORAL at 21:35

## 2018-01-01 RX ADMIN — CARBAMAZEPINE 200 MG: 200 TABLET ORAL at 19:56

## 2018-01-01 RX ADMIN — DEXAMETHASONE SODIUM PHOSPHATE 4 MG: 4 INJECTION, SOLUTION INTRA-ARTICULAR; INTRALESIONAL; INTRAMUSCULAR; INTRAVENOUS; SOFT TISSUE at 09:18

## 2018-01-01 RX ADMIN — EXTENDED PHENYTOIN SODIUM 120 MG: 30 CAPSULE ORAL at 21:00

## 2018-01-01 RX ADMIN — LACTULOSE 20 G: 10 SOLUTION ORAL at 10:28

## 2018-01-01 RX ADMIN — METHYLPHENIDATE HYDROCHLORIDE 10 MG: 10 TABLET ORAL at 09:13

## 2018-01-01 RX ADMIN — MICONAZOLE NITRATE: 20 CREAM TOPICAL at 13:11

## 2018-01-01 RX ADMIN — LACTULOSE 20 G: 20 SOLUTION ORAL at 21:20

## 2018-01-01 RX ADMIN — LACTULOSE 60 ML: 10 SOLUTION ORAL at 18:49

## 2018-01-01 RX ADMIN — LACTULOSE 20 G: 20 SOLUTION ORAL at 11:27

## 2018-01-01 RX ADMIN — RIFAXIMIN 550 MG: 550 TABLET ORAL at 09:26

## 2018-01-01 RX ADMIN — PROPOFOL 90 MG: 10 INJECTION, EMULSION INTRAVENOUS at 10:10

## 2018-01-01 RX ADMIN — OMEPRAZOLE 40 MG: 20 CAPSULE, DELAYED RELEASE ORAL at 11:15

## 2018-01-01 RX ADMIN — PIPERACILLIN AND TAZOBACTAM 4.5 G: 4; .5 INJECTION, POWDER, FOR SOLUTION INTRAVENOUS at 09:23

## 2018-01-01 RX ADMIN — GABAPENTIN 100 MG: 100 CAPSULE ORAL at 20:33

## 2018-01-01 RX ADMIN — MULTIPLE VITAMINS W/ MINERALS TAB 1 TABLET: TAB at 09:39

## 2018-01-01 RX ADMIN — LACTULOSE 60 ML: 10 SOLUTION ORAL at 11:10

## 2018-01-01 RX ADMIN — RIFAXIMIN 550 MG: 550 TABLET ORAL at 09:20

## 2018-01-01 RX ADMIN — EXTENDED PHENYTOIN SODIUM 120 MG: 30 CAPSULE ORAL at 08:41

## 2018-01-01 RX ADMIN — CALCIUM CARBONATE-VITAMIN D TAB 500 MG-200 UNIT 1 TABLET: 500-200 TAB at 11:58

## 2018-01-01 RX ADMIN — MIDAZOLAM 1 MG: 1 INJECTION INTRAMUSCULAR; INTRAVENOUS at 09:12

## 2018-01-01 RX ADMIN — FERROUS SULFATE TAB 325 MG (65 MG ELEMENTAL FE) 325 MG: 325 (65 FE) TAB at 13:35

## 2018-01-01 RX ADMIN — FERROUS SULFATE TAB 325 MG (65 MG ELEMENTAL FE) 325 MG: 325 (65 FE) TAB at 21:00

## 2018-01-01 RX ADMIN — PRAVASTATIN SODIUM 80 MG: 40 TABLET ORAL at 09:37

## 2018-01-01 RX ADMIN — CICLOPIROX OLAMINE: 7.7 CREAM TOPICAL at 20:54

## 2018-01-01 RX ADMIN — LORAZEPAM 2 MG: 2 INJECTION INTRAMUSCULAR; INTRAVENOUS at 14:42

## 2018-01-01 RX ADMIN — CEFTRIAXONE 1 G: 1 INJECTION, POWDER, FOR SOLUTION INTRAMUSCULAR; INTRAVENOUS at 02:24

## 2018-01-01 RX ADMIN — LACTULOSE 20 G: 10 SOLUTION ORAL at 14:29

## 2018-01-01 RX ADMIN — MIRTAZAPINE 45 MG: 45 TABLET, FILM COATED ORAL at 21:33

## 2018-01-01 RX ADMIN — THIAMINE HYDROCHLORIDE 500 MG: 100 INJECTION, SOLUTION INTRAMUSCULAR; INTRAVENOUS at 20:38

## 2018-01-01 RX ADMIN — ALBUTEROL SULFATE 4 PUFF: 90 AEROSOL, METERED RESPIRATORY (INHALATION) at 12:26

## 2018-01-01 RX ADMIN — LEVOTHYROXINE SODIUM 88 MCG: 88 TABLET ORAL at 09:22

## 2018-01-01 RX ADMIN — CYANOCOBALAMIN TAB 1000 MCG 1000 MCG: 1000 TAB at 09:03

## 2018-01-01 RX ADMIN — SODIUM CHLORIDE 500 ML: 9 INJECTION, SOLUTION INTRAVENOUS at 13:55

## 2018-01-01 RX ADMIN — CARBAMAZEPINE 200 MG: 200 TABLET ORAL at 09:19

## 2018-01-01 RX ADMIN — RIFAXIMIN 550 MG: 550 TABLET ORAL at 21:39

## 2018-01-01 RX ADMIN — METHYLPHENIDATE HYDROCHLORIDE 10 MG: 10 TABLET ORAL at 09:22

## 2018-01-01 RX ADMIN — LEVETIRACETAM 750 MG: 750 TABLET, FILM COATED ORAL at 09:20

## 2018-01-01 RX ADMIN — LEVETIRACETAM 750 MG: 750 TABLET, FILM COATED ORAL at 19:08

## 2018-01-01 RX ADMIN — METHYLPHENIDATE HYDROCHLORIDE 10 MG: 10 TABLET ORAL at 09:39

## 2018-01-01 RX ADMIN — VITAMIN D, TAB 1000IU (100/BT) 1000 UNITS: 25 TAB at 10:28

## 2018-01-01 RX ADMIN — CALCIUM CARBONATE-VITAMIN D TAB 500 MG-200 UNIT 1 TABLET: 500-200 TAB at 13:35

## 2018-01-01 RX ADMIN — MIRTAZAPINE 45 MG: 15 TABLET, FILM COATED ORAL at 21:03

## 2018-01-01 RX ADMIN — VITAMIN D, TAB 1000IU (100/BT) 1000 UNITS: 25 TAB at 08:39

## 2018-01-01 RX ADMIN — MICONAZOLE NITRATE: 2 POWDER TOPICAL at 21:23

## 2018-01-01 RX ADMIN — LEVOTHYROXINE SODIUM 88 MCG: 88 TABLET ORAL at 08:01

## 2018-01-01 RX ADMIN — RIFAXIMIN 550 MG: 550 TABLET ORAL at 19:56

## 2018-01-01 RX ADMIN — SODIUM CHLORIDE, PRESERVATIVE FREE 88 ML: 5 INJECTION INTRAVENOUS at 20:53

## 2018-01-01 RX ADMIN — HYDROXYZINE HYDROCHLORIDE 25 MG: 25 TABLET ORAL at 10:01

## 2018-01-01 RX ADMIN — ALBUTEROL SULFATE 2.5 MG: 2.5 SOLUTION RESPIRATORY (INHALATION) at 15:21

## 2018-01-01 RX ADMIN — LACTULOSE 20 G: 20 SOLUTION ORAL at 05:18

## 2018-01-01 RX ADMIN — CARBAMAZEPINE 200 MG: 200 TABLET ORAL at 09:06

## 2018-01-01 RX ADMIN — SPIRONOLACTONE 50 MG: 50 TABLET ORAL at 08:25

## 2018-01-01 RX ADMIN — CALCIUM CARBONATE-VITAMIN D TAB 500 MG-200 UNIT 1 TABLET: 500-200 TAB at 21:10

## 2018-01-01 RX ADMIN — FERROUS SULFATE TAB 325 MG (65 MG ELEMENTAL FE) 325 MG: 325 (65 FE) TAB at 09:51

## 2018-01-01 RX ADMIN — WATER 100 ML: 100 IRRIGANT IRRIGATION at 09:38

## 2018-01-01 RX ADMIN — SPIRONOLACTONE 50 MG: 50 TABLET ORAL at 09:16

## 2018-01-01 RX ADMIN — LEVOTHYROXINE SODIUM 88 MCG: 88 TABLET ORAL at 08:28

## 2018-01-01 RX ADMIN — MULTIPLE VITAMINS W/ MINERALS TAB 1 TABLET: TAB at 08:15

## 2018-01-01 RX ADMIN — RIFAXIMIN 550 MG: 550 TABLET ORAL at 08:58

## 2018-01-01 RX ADMIN — LEVOTHYROXINE SODIUM 88 MCG: 88 TABLET ORAL at 09:27

## 2018-01-01 RX ADMIN — CICLOPIROX OLAMINE: 7.7 CREAM TOPICAL at 09:21

## 2018-01-01 RX ADMIN — LEVOFLOXACIN 500 MG: 500 TABLET, FILM COATED ORAL at 09:20

## 2018-01-01 RX ADMIN — OMEPRAZOLE 40 MG: 20 CAPSULE, DELAYED RELEASE ORAL at 20:55

## 2018-01-01 RX ADMIN — CARBAMAZEPINE 200 MG: 200 TABLET ORAL at 08:14

## 2018-01-01 RX ADMIN — HYDROXYZINE HYDROCHLORIDE 25 MG: 25 TABLET ORAL at 08:39

## 2018-01-01 RX ADMIN — CARBAMAZEPINE 200 MG: 200 TABLET ORAL at 10:04

## 2018-01-01 RX ADMIN — CARBAMAZEPINE 200 MG: 200 TABLET ORAL at 20:50

## 2018-01-01 RX ADMIN — CARBAMAZEPINE 200 MG: 200 TABLET ORAL at 07:53

## 2018-01-01 RX ADMIN — MICONAZOLE NITRATE: 2 POWDER TOPICAL at 09:16

## 2018-01-01 RX ADMIN — CYANOCOBALAMIN TAB 1000 MCG 1000 MCG: 1000 TAB at 09:21

## 2018-01-01 RX ADMIN — LACTULOSE 60 ML: 10 SOLUTION ORAL at 19:42

## 2018-01-01 RX ADMIN — FERROUS SULFATE TAB 325 MG (65 MG ELEMENTAL FE) 325 MG: 325 (65 FE) TAB at 20:33

## 2018-01-01 RX ADMIN — CALCIUM CARBONATE-VITAMIN D TAB 500 MG-200 UNIT 1 TABLET: 500-200 TAB at 12:07

## 2018-01-01 RX ADMIN — CEFTRIAXONE SODIUM 2 G: 2 INJECTION, POWDER, FOR SOLUTION INTRAMUSCULAR; INTRAVENOUS at 03:40

## 2018-01-01 RX ADMIN — LEVOTHYROXINE SODIUM 88 MCG: 88 TABLET ORAL at 09:02

## 2018-01-01 RX ADMIN — Medication 100 MG: at 08:07

## 2018-01-01 RX ADMIN — RIFAXIMIN 550 MG: 550 TABLET ORAL at 08:32

## 2018-01-01 RX ADMIN — MIRTAZAPINE 45 MG: 15 TABLET, FILM COATED ORAL at 20:34

## 2018-01-01 RX ADMIN — OMEPRAZOLE 40 MG: 20 CAPSULE, DELAYED RELEASE ORAL at 16:46

## 2018-01-01 RX ADMIN — SPIRONOLACTONE 50 MG: 50 TABLET ORAL at 18:15

## 2018-01-01 RX ADMIN — CYANOCOBALAMIN TAB 1000 MCG 1000 MCG: 1000 TAB at 08:24

## 2018-01-01 RX ADMIN — LEVOTHYROXINE SODIUM 88 MCG: 88 TABLET ORAL at 09:14

## 2018-01-01 RX ADMIN — THIAMINE HYDROCHLORIDE 500 MG: 100 INJECTION, SOLUTION INTRAMUSCULAR; INTRAVENOUS at 10:30

## 2018-01-01 RX ADMIN — MULTIPLE VITAMINS W/ MINERALS TAB 1 TABLET: TAB at 12:08

## 2018-01-01 RX ADMIN — LEVETIRACETAM 750 MG: 750 TABLET, FILM COATED ORAL at 21:41

## 2018-01-01 RX ADMIN — MIRTAZAPINE 45 MG: 15 TABLET, FILM COATED ORAL at 22:30

## 2018-01-01 RX ADMIN — VITAMIN D, TAB 1000IU (100/BT) 1000 UNITS: 25 TAB at 09:26

## 2018-01-01 RX ADMIN — THIAMINE HYDROCHLORIDE 500 MG: 100 INJECTION, SOLUTION INTRAMUSCULAR; INTRAVENOUS at 15:47

## 2018-01-01 RX ADMIN — FERROUS SULFATE TAB 325 MG (65 MG ELEMENTAL FE) 325 MG: 325 (65 FE) TAB at 08:28

## 2018-01-01 RX ADMIN — SPIRONOLACTONE 50 MG: 50 TABLET ORAL at 09:46

## 2018-01-01 RX ADMIN — RIFAXIMIN 550 MG: 550 TABLET ORAL at 09:09

## 2018-01-01 RX ADMIN — EXTENDED PHENYTOIN SODIUM 120 MG: 30 CAPSULE ORAL at 08:07

## 2018-01-01 RX ADMIN — CYANOCOBALAMIN TAB 1000 MCG 1000 MCG: 1000 TAB at 08:29

## 2018-01-01 RX ADMIN — PRAVASTATIN SODIUM 80 MG: 40 TABLET ORAL at 09:26

## 2018-01-01 RX ADMIN — FUROSEMIDE 20 MG: 20 TABLET ORAL at 08:29

## 2018-01-01 RX ADMIN — FERROUS SULFATE TAB 325 MG (65 MG ELEMENTAL FE) 325 MG: 325 (65 FE) TAB at 11:15

## 2018-01-01 RX ADMIN — CALCIUM CARBONATE-VITAMIN D TAB 500 MG-200 UNIT 1 TABLET: 500-200 TAB at 09:17

## 2018-01-01 RX ADMIN — MULTIPLE VITAMINS W/ MINERALS TAB 1 TABLET: TAB at 08:24

## 2018-01-01 RX ADMIN — HEPARIN SODIUM (PORCINE) LOCK FLUSH IV SOLN 100 UNIT/ML 5 ML: 100 SOLUTION at 16:40

## 2018-01-01 RX ADMIN — FUROSEMIDE 20 MG: 20 TABLET ORAL at 09:02

## 2018-01-01 RX ADMIN — MULTIPLE VITAMINS W/ MINERALS TAB 1 TABLET: TAB at 09:14

## 2018-01-01 RX ADMIN — OMEPRAZOLE 40 MG: 20 CAPSULE, DELAYED RELEASE ORAL at 09:02

## 2018-01-01 RX ADMIN — HEPARIN 5 ML: 100 SYRINGE at 13:04

## 2018-01-01 RX ADMIN — CARBAMAZEPINE 200 MG: 200 TABLET ORAL at 19:51

## 2018-01-01 RX ADMIN — CARBAMAZEPINE 200 MG: 200 TABLET ORAL at 20:00

## 2018-01-01 RX ADMIN — LACTULOSE 60 ML: 10 SOLUTION ORAL at 09:21

## 2018-01-01 RX ADMIN — CARBAMAZEPINE 200 MG: 100 TABLET, CHEWABLE ORAL at 09:51

## 2018-01-01 RX ADMIN — RIFAXIMIN 550 MG: 550 TABLET ORAL at 09:27

## 2018-01-01 RX ADMIN — FERROUS SULFATE TAB 325 MG (65 MG ELEMENTAL FE) 325 MG: 325 (65 FE) TAB at 09:38

## 2018-01-01 RX ADMIN — LACTULOSE 60 ML: 10 SOLUTION ORAL at 21:41

## 2018-01-01 RX ADMIN — ZINC SULFATE CAP 220 MG (50 MG ELEMENTAL ZN) 220 MG: 220 (50 ZN) CAP at 17:00

## 2018-01-01 RX ADMIN — METHYLPHENIDATE HYDROCHLORIDE 10 MG: 10 TABLET ORAL at 20:38

## 2018-01-01 RX ADMIN — CICLOPIROX OLAMINE: 7.7 CREAM TOPICAL at 21:23

## 2018-01-01 RX ADMIN — METHYLPHENIDATE HYDROCHLORIDE 10 MG: 10 TABLET ORAL at 12:08

## 2018-01-01 RX ADMIN — LACTULOSE 20 G: 20 SOLUTION ORAL at 19:49

## 2018-01-01 RX ADMIN — LACTULOSE 20 G: 20 SOLUTION ORAL at 08:38

## 2018-01-01 RX ADMIN — FOLIC ACID 1 MG: 1 TABLET ORAL at 07:53

## 2018-01-01 RX ADMIN — POTASSIUM CHLORIDE 40 MEQ: 750 TABLET, EXTENDED RELEASE ORAL at 10:58

## 2018-01-01 RX ADMIN — METHYLPHENIDATE HYDROCHLORIDE 10 MG: 10 TABLET ORAL at 13:35

## 2018-01-01 RX ADMIN — CARBAMAZEPINE 200 MG: 200 TABLET ORAL at 08:57

## 2018-01-01 RX ADMIN — Medication 1000 MCG: at 08:07

## 2018-01-01 RX ADMIN — FENTANYL CITRATE 50 MCG: 50 INJECTION, SOLUTION INTRAMUSCULAR; INTRAVENOUS at 09:18

## 2018-01-01 RX ADMIN — LEVOTHYROXINE SODIUM 88 MCG: 88 TABLET ORAL at 08:59

## 2018-01-01 RX ADMIN — CARBAMAZEPINE 200 MG: 200 TABLET ORAL at 21:40

## 2018-01-01 RX ADMIN — LEVOTHYROXINE SODIUM 88 MCG: 88 TABLET ORAL at 09:16

## 2018-01-01 RX ADMIN — MULTIPLE VITAMINS W/ MINERALS TAB 1 TABLET: TAB at 09:21

## 2018-01-01 RX ADMIN — CARBAMAZEPINE 200 MG: 100 TABLET, CHEWABLE ORAL at 09:26

## 2018-01-01 RX ADMIN — FENTANYL CITRATE 50 MCG: 50 INJECTION, SOLUTION INTRAMUSCULAR; INTRAVENOUS at 10:10

## 2018-01-01 RX ADMIN — FOLIC ACID 1 MG: 1 TABLET ORAL at 08:32

## 2018-01-01 RX ADMIN — CYANOCOBALAMIN TAB 1000 MCG 1000 MCG: 1000 TAB at 11:16

## 2018-01-01 RX ADMIN — CYANOCOBALAMIN TAB 1000 MCG 1000 MCG: 1000 TAB at 09:39

## 2018-01-01 RX ADMIN — FERROUS SULFATE TAB 325 MG (65 MG ELEMENTAL FE) 325 MG: 325 (65 FE) TAB at 20:54

## 2018-01-01 RX ADMIN — OMEPRAZOLE 40 MG: 20 CAPSULE, DELAYED RELEASE ORAL at 09:51

## 2018-01-01 RX ADMIN — MULTIPLE VITAMINS W/ MINERALS TAB 1 TABLET: TAB at 09:52

## 2018-01-01 RX ADMIN — MULTIPLE VITAMINS W/ MINERALS TAB 1 TABLET: TAB at 09:02

## 2018-01-01 RX ADMIN — OMEPRAZOLE 40 MG: 20 CAPSULE, DELAYED RELEASE ORAL at 08:07

## 2018-01-01 RX ADMIN — METHYLPHENIDATE HYDROCHLORIDE 10 MG: 10 TABLET ORAL at 09:07

## 2018-01-01 RX ADMIN — MULTIPLE VITAMINS W/ MINERALS TAB 1 TABLET: TAB at 12:05

## 2018-01-01 RX ADMIN — METHYLPHENIDATE HYDROCHLORIDE 10 MG: 10 TABLET ORAL at 14:09

## 2018-01-01 RX ADMIN — RIFAXIMIN 550 MG: 550 TABLET ORAL at 19:49

## 2018-01-01 RX ADMIN — EXTENDED PHENYTOIN SODIUM 120 MG: 30 CAPSULE ORAL at 20:42

## 2018-01-01 RX ADMIN — CYANOCOBALAMIN TAB 1000 MCG 1000 MCG: 1000 TAB at 09:20

## 2018-01-01 RX ADMIN — MULTIPLE VITAMINS W/ MINERALS TAB 1 TABLET: TAB at 09:26

## 2018-01-01 RX ADMIN — Medication 100 MG: at 08:58

## 2018-01-01 RX ADMIN — VANCOMYCIN HYDROCHLORIDE 1500 MG: 10 INJECTION, POWDER, LYOPHILIZED, FOR SOLUTION INTRAVENOUS at 17:36

## 2018-01-01 RX ADMIN — CALCIUM CARBONATE-VITAMIN D TAB 500 MG-200 UNIT 1 TABLET: 500-200 TAB at 12:08

## 2018-01-01 RX ADMIN — POTASSIUM CHLORIDE 20 MEQ: 750 TABLET, EXTENDED RELEASE ORAL at 09:31

## 2018-01-01 RX ADMIN — Medication 100 MG: at 08:16

## 2018-01-01 RX ADMIN — CEFTRIAXONE SODIUM 2 G: 2 INJECTION, POWDER, FOR SOLUTION INTRAMUSCULAR; INTRAVENOUS at 02:58

## 2018-01-01 RX ADMIN — VITAMIN D, TAB 1000IU (100/BT) 1000 UNITS: 25 TAB at 09:30

## 2018-01-01 RX ADMIN — SODIUM CHLORIDE, PRESERVATIVE FREE 79 ML: 5 INJECTION INTRAVENOUS at 12:46

## 2018-01-01 RX ADMIN — CARBAMAZEPINE 200 MG: 100 TABLET, CHEWABLE ORAL at 11:16

## 2018-01-01 RX ADMIN — CALCIUM CARBONATE-VITAMIN D TAB 500 MG-200 UNIT 1 TABLET: 500-200 TAB at 09:51

## 2018-01-01 RX ADMIN — LACTULOSE 60 ML: 10 SOLUTION ORAL at 13:56

## 2018-01-01 RX ADMIN — VITAMIN D, TAB 1000IU (100/BT) 1000 UNITS: 25 TAB at 10:02

## 2018-01-01 RX ADMIN — LACTULOSE 20 G: 20 SOLUTION ORAL at 09:02

## 2018-01-01 RX ADMIN — LIDOCAINE HYDROCHLORIDE 120 MG: 20 INJECTION, SOLUTION INFILTRATION; PERINEURAL at 12:50

## 2018-01-01 RX ADMIN — LACTULOSE 60 ML: 10 SOLUTION ORAL at 19:49

## 2018-01-01 RX ADMIN — Medication 5 ML: at 12:54

## 2018-01-01 RX ADMIN — LACTULOSE 30 ML: 10 SOLUTION ORAL at 08:58

## 2018-01-01 RX ADMIN — CIPROFLOXACIN HYDROCHLORIDE 500 MG: 500 TABLET, FILM COATED ORAL at 08:07

## 2018-01-01 RX ADMIN — OMEPRAZOLE 40 MG: 20 CAPSULE, DELAYED RELEASE ORAL at 20:42

## 2018-01-01 RX ADMIN — CICLOPIROX OLAMINE: 7.7 CREAM TOPICAL at 20:51

## 2018-01-01 RX ADMIN — FERROUS SULFATE TAB 325 MG (65 MG ELEMENTAL FE) 325 MG: 325 (65 FE) TAB at 09:21

## 2018-01-01 RX ADMIN — PHYTONADIONE 10 MG: 5 TABLET ORAL at 09:19

## 2018-01-01 RX ADMIN — FERROUS SULFATE TAB 325 MG (65 MG ELEMENTAL FE) 325 MG: 325 (65 FE) TAB at 09:37

## 2018-01-01 RX ADMIN — Medication 100 MG: at 09:15

## 2018-01-01 RX ADMIN — OMEPRAZOLE 40 MG: 20 CAPSULE, DELAYED RELEASE ORAL at 16:00

## 2018-01-01 RX ADMIN — LACTULOSE 30 ML: 10 SOLUTION ORAL at 10:03

## 2018-01-01 RX ADMIN — ROCURONIUM BROMIDE 50 MG: 10 INJECTION INTRAVENOUS at 10:10

## 2018-01-01 RX ADMIN — ALBUMIN HUMAN 50 G: 0.25 SOLUTION INTRAVENOUS at 16:40

## 2018-01-01 RX ADMIN — SPIRONOLACTONE 50 MG: 25 TABLET, FILM COATED ORAL at 09:06

## 2018-01-01 RX ADMIN — CALCIUM CARBONATE-VITAMIN D TAB 500 MG-200 UNIT 1 TABLET: 500-200 TAB at 10:28

## 2018-01-01 RX ADMIN — CYANOCOBALAMIN TAB 1000 MCG 1000 MCG: 1000 TAB at 08:28

## 2018-01-01 RX ADMIN — VITAMIN D, TAB 1000IU (100/BT) 1000 UNITS: 25 TAB at 09:07

## 2018-01-01 RX ADMIN — LACTULOSE 20 G: 20 SOLUTION ORAL at 00:55

## 2018-01-01 RX ADMIN — SODIUM CHLORIDE, PRESERVATIVE FREE 5 ML: 5 INJECTION INTRAVENOUS at 09:36

## 2018-01-01 RX ADMIN — SPIRONOLACTONE 25 MG: 25 TABLET ORAL at 09:50

## 2018-01-01 RX ADMIN — LACTULOSE 100 G: 10 SOLUTION ORAL; RECTAL at 06:16

## 2018-01-01 RX ADMIN — POTASSIUM CHLORIDE 20 MEQ: 750 TABLET, EXTENDED RELEASE ORAL at 08:07

## 2018-01-01 RX ADMIN — RIFAXIMIN 550 MG: 550 TABLET ORAL at 20:33

## 2018-01-01 RX ADMIN — LACTULOSE 30 ML: 10 SOLUTION ORAL at 14:19

## 2018-01-01 RX ADMIN — ZINC SULFATE CAP 220 MG (50 MG ELEMENTAL ZN) 220 MG: 220 (50 ZN) CAP at 09:13

## 2018-01-01 RX ADMIN — CARBAMAZEPINE 200 MG: 200 TABLET ORAL at 09:21

## 2018-01-01 RX ADMIN — LEVETIRACETAM 750 MG: 750 TABLET, FILM COATED ORAL at 08:57

## 2018-01-01 RX ADMIN — PANTOPRAZOLE SODIUM 40 MG: 40 INJECTION, POWDER, FOR SOLUTION INTRAVENOUS at 00:49

## 2018-01-01 RX ADMIN — LACTULOSE 20 G: 10 SOLUTION ORAL at 08:31

## 2018-01-01 RX ADMIN — LACTULOSE 20 G: 20 SOLUTION ORAL at 11:04

## 2018-01-01 RX ADMIN — LACTULOSE 20 G: 10 SOLUTION ORAL at 21:30

## 2018-01-01 RX ADMIN — LACTULOSE 20 G: 20 SOLUTION ORAL at 00:53

## 2018-01-01 RX ADMIN — CYANOCOBALAMIN TAB 1000 MCG 1000 MCG: 1000 TAB at 09:14

## 2018-01-01 RX ADMIN — SPIRONOLACTONE 25 MG: 25 TABLET ORAL at 12:54

## 2018-01-01 RX ADMIN — PRAVASTATIN SODIUM 80 MG: 40 TABLET ORAL at 08:11

## 2018-01-01 RX ADMIN — EXTENDED PHENYTOIN SODIUM 120 MG: 30 CAPSULE ORAL at 22:03

## 2018-01-01 RX ADMIN — EXTENDED PHENYTOIN SODIUM 120 MG: 30 CAPSULE ORAL at 21:30

## 2018-01-01 RX ADMIN — RIFAXIMIN 550 MG: 550 TABLET ORAL at 19:08

## 2018-01-01 RX ADMIN — HEPARIN SODIUM (PORCINE) LOCK FLUSH IV SOLN 100 UNIT/ML 5 ML: 100 SOLUTION at 17:47

## 2018-01-01 RX ADMIN — HYDROXYZINE HYDROCHLORIDE 25 MG: 25 TABLET ORAL at 20:33

## 2018-01-01 RX ADMIN — Medication 2 G: at 10:00

## 2018-01-01 RX ADMIN — LIDOCAINE HYDROCHLORIDE 100 MG: 20 INJECTION, SOLUTION INFILTRATION; PERINEURAL at 10:10

## 2018-01-01 RX ADMIN — Medication 100 MG: at 10:01

## 2018-01-01 RX ADMIN — MIRTAZAPINE 45 MG: 30 TABLET, FILM COATED ORAL at 22:18

## 2018-01-01 RX ADMIN — OMEPRAZOLE 40 MG: 20 CAPSULE, DELAYED RELEASE ORAL at 16:12

## 2018-01-01 RX ADMIN — SPIRONOLACTONE 50 MG: 50 TABLET ORAL at 09:17

## 2018-01-01 RX ADMIN — CYANOCOBALAMIN TAB 1000 MCG 1000 MCG: 1000 TAB at 07:56

## 2018-01-01 RX ADMIN — POTASSIUM CHLORIDE 20 MEQ: 750 TABLET, EXTENDED RELEASE ORAL at 13:46

## 2018-01-01 RX ADMIN — RIFAXIMIN 550 MG: 550 TABLET ORAL at 20:54

## 2018-01-01 RX ADMIN — LACTULOSE 20 G: 20 SOLUTION ORAL at 13:09

## 2018-01-01 RX ADMIN — GABAPENTIN 100 MG: 100 CAPSULE ORAL at 00:02

## 2018-01-01 RX ADMIN — HEPARIN 5 ML: 100 SYRINGE at 09:12

## 2018-01-01 RX ADMIN — RIFAXIMIN 550 MG: 550 TABLET ORAL at 09:51

## 2018-01-01 RX ADMIN — VITAMIN D, TAB 1000IU (100/BT) 1000 UNITS: 25 TAB at 09:21

## 2018-01-01 RX ADMIN — LEVETIRACETAM 750 MG: 750 TABLET, FILM COATED ORAL at 09:22

## 2018-01-01 RX ADMIN — MICONAZOLE NITRATE: 2 POWDER TOPICAL at 20:54

## 2018-01-01 RX ADMIN — EXTENDED PHENYTOIN SODIUM 120 MG: 30 CAPSULE ORAL at 20:33

## 2018-01-01 RX ADMIN — FERROUS SULFATE TAB 325 MG (65 MG ELEMENTAL FE) 325 MG: 325 (65 FE) TAB at 21:20

## 2018-01-01 RX ADMIN — FOLIC ACID 1 MG: 1 TABLET ORAL at 08:01

## 2018-01-01 RX ADMIN — CIPROFLOXACIN HYDROCHLORIDE 500 MG: 500 TABLET, FILM COATED ORAL at 20:33

## 2018-01-01 RX ADMIN — LACTULOSE 20 G: 20 POWDER, FOR SOLUTION ORAL at 15:16

## 2018-01-01 RX ADMIN — LACTULOSE 60 ML: 10 SOLUTION ORAL at 20:17

## 2018-01-01 RX ADMIN — FERROUS SULFATE TAB 325 MG (65 MG ELEMENTAL FE) 325 MG: 325 (65 FE) TAB at 21:36

## 2018-01-01 RX ADMIN — PHENYLEPHRINE HYDROCHLORIDE 100 MCG: 10 INJECTION, SOLUTION INTRAMUSCULAR; INTRAVENOUS; SUBCUTANEOUS at 12:09

## 2018-01-01 RX ADMIN — Medication 5 ML: at 21:07

## 2018-01-01 RX ADMIN — PIPERACILLIN AND TAZOBACTAM 4.5 G: 4; .5 INJECTION, POWDER, FOR SOLUTION INTRAVENOUS at 15:43

## 2018-01-01 RX ADMIN — RIFAXIMIN 550 MG: 550 TABLET ORAL at 09:22

## 2018-01-01 RX ADMIN — CARBAMAZEPINE 200 MG: 200 TABLET ORAL at 19:48

## 2018-01-01 RX ADMIN — CARBAMAZEPINE 200 MG: 200 TABLET ORAL at 09:20

## 2018-01-01 RX ADMIN — FUROSEMIDE 20 MG: 20 TABLET ORAL at 18:14

## 2018-01-01 RX ADMIN — LACTULOSE 20 G: 20 SOLUTION ORAL at 18:15

## 2018-01-01 RX ADMIN — PRAVASTATIN SODIUM 80 MG: 40 TABLET ORAL at 09:14

## 2018-01-01 RX ADMIN — METHYLPHENIDATE HYDROCHLORIDE 10 MG: 10 TABLET ORAL at 19:31

## 2018-01-01 RX ADMIN — ONDANSETRON 4 MG: 2 INJECTION INTRAMUSCULAR; INTRAVENOUS at 13:16

## 2018-01-01 RX ADMIN — CYANOCOBALAMIN TAB 1000 MCG 1000 MCG: 1000 TAB at 09:27

## 2018-01-01 RX ADMIN — CALCIUM CARBONATE-VITAMIN D TAB 500 MG-200 UNIT 1 TABLET: 500-200 TAB at 12:05

## 2018-01-01 RX ADMIN — GABAPENTIN 100 MG: 100 CAPSULE ORAL at 22:19

## 2018-01-01 RX ADMIN — CARBAMAZEPINE 200 MG: 200 TABLET ORAL at 20:33

## 2018-01-01 RX ADMIN — LACTULOSE 60 ML: 10 SOLUTION ORAL at 13:35

## 2018-01-01 RX ADMIN — PRAVASTATIN SODIUM 80 MG: 40 TABLET ORAL at 08:24

## 2018-01-01 RX ADMIN — CARBAMAZEPINE 200 MG: 200 TABLET ORAL at 19:31

## 2018-01-01 RX ADMIN — FERROUS SULFATE TAB 325 MG (65 MG ELEMENTAL FE) 325 MG: 325 (65 FE) TAB at 19:51

## 2018-01-01 RX ADMIN — SODIUM CHLORIDE, PRESERVATIVE FREE 5 ML: 5 INJECTION INTRAVENOUS at 09:41

## 2018-01-01 RX ADMIN — LACTULOSE 20 G: 20 SOLUTION ORAL at 09:11

## 2018-01-01 RX ADMIN — Medication 100 MG: at 09:38

## 2018-01-01 RX ADMIN — VITAMIN D, TAB 1000IU (100/BT) 1000 UNITS: 25 TAB at 18:52

## 2018-01-01 RX ADMIN — LEVOTHYROXINE SODIUM 88 MCG: 88 TABLET ORAL at 09:17

## 2018-01-01 RX ADMIN — FERROUS SULFATE TAB 325 MG (65 MG ELEMENTAL FE) 325 MG: 325 (65 FE) TAB at 11:53

## 2018-01-01 RX ADMIN — GABAPENTIN 100 MG: 100 CAPSULE ORAL at 21:10

## 2018-01-01 RX ADMIN — SODIUM CHLORIDE, PRESERVATIVE FREE 100 ML: 5 INJECTION INTRAVENOUS at 12:09

## 2018-01-01 RX ADMIN — EXTENDED PHENYTOIN SODIUM 120 MG: 30 CAPSULE ORAL at 10:00

## 2018-01-01 RX ADMIN — FERROUS SULFATE TAB 325 MG (65 MG ELEMENTAL FE) 325 MG: 325 (65 FE) TAB at 19:49

## 2018-01-01 RX ADMIN — CARBAMAZEPINE 200 MG: 200 TABLET ORAL at 09:14

## 2018-01-01 RX ADMIN — MIRTAZAPINE 45 MG: 15 TABLET, FILM COATED ORAL at 21:20

## 2018-01-01 RX ADMIN — ALBUMIN HUMAN 50 G: 0.25 SOLUTION INTRAVENOUS at 18:09

## 2018-01-01 RX ADMIN — CALCIUM CARBONATE-VITAMIN D TAB 500 MG-200 UNIT 1 TABLET: 500-200 TAB at 11:16

## 2018-01-01 RX ADMIN — GABAPENTIN 100 MG: 100 CAPSULE ORAL at 22:22

## 2018-01-01 RX ADMIN — OMEPRAZOLE 40 MG: 20 CAPSULE, DELAYED RELEASE ORAL at 08:01

## 2018-01-01 RX ADMIN — OMEPRAZOLE 40 MG: 20 CAPSULE, DELAYED RELEASE ORAL at 17:49

## 2018-01-01 RX ADMIN — CARBAMAZEPINE 200 MG: 200 TABLET ORAL at 21:36

## 2018-01-01 RX ADMIN — LACTULOSE 20 G: 20 SOLUTION ORAL at 10:35

## 2018-01-01 RX ADMIN — RIFAXIMIN 550 MG: 550 TABLET ORAL at 20:45

## 2018-01-01 RX ADMIN — MULTIPLE VITAMINS W/ MINERALS TAB 1 TABLET: TAB at 13:11

## 2018-01-01 RX ADMIN — RIFAXIMIN 550 MG: 550 TABLET ORAL at 19:54

## 2018-01-01 RX ADMIN — METHYLPHENIDATE HYDROCHLORIDE 10 MG: 10 TABLET ORAL at 08:37

## 2018-01-01 RX ADMIN — Medication 5 ML: at 07:07

## 2018-01-01 RX ADMIN — LEVETIRACETAM 750 MG: 750 TABLET, FILM COATED ORAL at 20:45

## 2018-01-01 RX ADMIN — RIFAXIMIN 550 MG: 550 TABLET ORAL at 09:39

## 2018-01-01 RX ADMIN — DEXAMETHASONE SODIUM PHOSPHATE 4 MG: 4 INJECTION, SOLUTION INTRA-ARTICULAR; INTRALESIONAL; INTRAMUSCULAR; INTRAVENOUS; SOFT TISSUE at 12:45

## 2018-01-01 RX ADMIN — PROPOFOL 40 MG: 10 INJECTION, EMULSION INTRAVENOUS at 13:13

## 2018-01-01 RX ADMIN — PRAVASTATIN SODIUM 80 MG: 40 TABLET ORAL at 08:07

## 2018-01-01 RX ADMIN — RIFAXIMIN 550 MG: 550 TABLET ORAL at 21:00

## 2018-01-01 RX ADMIN — LACTULOSE 20 G: 10 SOLUTION ORAL at 20:59

## 2018-01-01 RX ADMIN — OMEPRAZOLE 40 MG: 20 CAPSULE, DELAYED RELEASE ORAL at 18:01

## 2018-01-01 RX ADMIN — HYDROXYZINE HYDROCHLORIDE 25 MG: 25 TABLET ORAL at 19:56

## 2018-01-01 RX ADMIN — Medication 5 ML: at 05:36

## 2018-01-01 RX ADMIN — MULTIPLE VITAMINS W/ MINERALS TAB 1 TABLET: TAB at 17:55

## 2018-01-01 RX ADMIN — CARBAMAZEPINE 200 MG: 200 TABLET ORAL at 08:29

## 2018-01-01 RX ADMIN — POTASSIUM CHLORIDE 40 MEQ: 750 TABLET, EXTENDED RELEASE ORAL at 06:18

## 2018-01-01 RX ADMIN — HEPARIN 5 ML: 100 SYRINGE at 13:43

## 2018-01-01 RX ADMIN — SODIUM CHLORIDE, POTASSIUM CHLORIDE, SODIUM LACTATE AND CALCIUM CHLORIDE: 600; 310; 30; 20 INJECTION, SOLUTION INTRAVENOUS at 11:45

## 2018-01-01 RX ADMIN — SODIUM CHLORIDE, POTASSIUM CHLORIDE, SODIUM LACTATE AND CALCIUM CHLORIDE 1000 ML: 600; 310; 30; 20 INJECTION, SOLUTION INTRAVENOUS at 17:15

## 2018-01-01 RX ADMIN — LACTULOSE 20 G: 20 SOLUTION ORAL at 13:35

## 2018-01-01 RX ADMIN — OMEPRAZOLE 40 MG: 20 CAPSULE, DELAYED RELEASE ORAL at 16:38

## 2018-01-01 RX ADMIN — FUROSEMIDE 20 MG: 20 TABLET ORAL at 10:01

## 2018-01-01 RX ADMIN — RIFAXIMIN 550 MG: 550 TABLET ORAL at 22:41

## 2018-01-01 RX ADMIN — FERROUS SULFATE TAB 325 MG (65 MG ELEMENTAL FE) 325 MG: 325 (65 FE) TAB at 19:41

## 2018-01-01 RX ADMIN — SPIRONOLACTONE 50 MG: 50 TABLET ORAL at 09:11

## 2018-01-01 RX ADMIN — RIFAXIMIN 550 MG: 550 TABLET ORAL at 20:50

## 2018-01-01 RX ADMIN — OMEPRAZOLE 40 MG: 20 CAPSULE, DELAYED RELEASE ORAL at 18:09

## 2018-01-01 RX ADMIN — LACTULOSE 20 G: 10 SOLUTION ORAL at 16:02

## 2018-01-01 RX ADMIN — CICLOPIROX OLAMINE: 7.7 CREAM TOPICAL at 08:28

## 2018-01-01 RX ADMIN — Medication 100 MG: at 08:59

## 2018-01-01 RX ADMIN — SPIRONOLACTONE 25 MG: 25 TABLET ORAL at 12:08

## 2018-01-01 RX ADMIN — RIFAXIMIN 550 MG: 550 TABLET ORAL at 19:31

## 2018-01-01 RX ADMIN — GABAPENTIN 100 MG: 100 CAPSULE ORAL at 21:03

## 2018-01-01 RX ADMIN — ZINC SULFATE CAP 220 MG (50 MG ELEMENTAL ZN) 220 MG: 220 (50 ZN) CAP at 09:22

## 2018-01-01 RX ADMIN — PRAVASTATIN SODIUM 80 MG: 40 TABLET ORAL at 08:39

## 2018-01-01 RX ADMIN — OMEPRAZOLE 40 MG: 20 CAPSULE, DELAYED RELEASE ORAL at 19:49

## 2018-01-01 RX ADMIN — LACTULOSE 20 G: 10 SOLUTION ORAL at 10:16

## 2018-01-01 RX ADMIN — CARBAMAZEPINE 200 MG: 200 TABLET ORAL at 19:42

## 2018-01-01 RX ADMIN — RIFAXIMIN 550 MG: 550 TABLET ORAL at 21:20

## 2018-01-01 RX ADMIN — OMEPRAZOLE 40 MG: 20 CAPSULE, DELAYED RELEASE ORAL at 19:52

## 2018-01-01 RX ADMIN — MIRTAZAPINE 45 MG: 30 TABLET, FILM COATED ORAL at 21:41

## 2018-01-01 RX ADMIN — Medication 150 MG: at 20:45

## 2018-01-01 RX ADMIN — SPIRONOLACTONE 25 MG: 25 TABLET ORAL at 09:38

## 2018-01-01 RX ADMIN — ROCURONIUM BROMIDE 50 MG: 10 INJECTION INTRAVENOUS at 09:18

## 2018-01-01 RX ADMIN — LEVOTHYROXINE SODIUM 88 MCG: 88 TABLET ORAL at 08:13

## 2018-01-01 RX ADMIN — Medication 100 MG: at 09:21

## 2018-01-01 RX ADMIN — RIFAXIMIN 550 MG: 550 TABLET ORAL at 09:14

## 2018-01-01 RX ADMIN — CYANOCOBALAMIN TAB 1000 MCG 1000 MCG: 1000 TAB at 09:07

## 2018-01-01 RX ADMIN — MULTIPLE VITAMINS W/ MINERALS TAB 1 TABLET: TAB at 09:20

## 2018-01-01 RX ADMIN — MIDAZOLAM 1 MG: 1 INJECTION INTRAMUSCULAR; INTRAVENOUS at 09:58

## 2018-01-01 RX ADMIN — LACTULOSE 20 G: 20 SOLUTION ORAL at 20:00

## 2018-01-01 RX ADMIN — RIFAXIMIN 550 MG: 550 TABLET ORAL at 19:47

## 2018-01-01 RX ADMIN — FERROUS SULFATE TAB 325 MG (65 MG ELEMENTAL FE) 325 MG: 325 (65 FE) TAB at 19:48

## 2018-01-01 RX ADMIN — SPIRONOLACTONE 50 MG: 50 TABLET ORAL at 09:02

## 2018-01-01 RX ADMIN — MIRTAZAPINE 45 MG: 15 TABLET, FILM COATED ORAL at 22:24

## 2018-01-01 RX ADMIN — CARBAMAZEPINE 200 MG: 200 TABLET ORAL at 19:08

## 2018-01-01 RX ADMIN — IOPAMIDOL 115 ML: 755 INJECTION, SOLUTION INTRAVENOUS at 12:09

## 2018-01-01 RX ADMIN — CARBAMAZEPINE 200 MG: 200 TABLET ORAL at 09:39

## 2018-01-01 RX ADMIN — PRAVASTATIN SODIUM 80 MG: 40 TABLET ORAL at 09:18

## 2018-01-01 RX ADMIN — CYANOCOBALAMIN TAB 1000 MCG 1000 MCG: 1000 TAB at 09:19

## 2018-01-01 RX ADMIN — PIPERACILLIN AND TAZOBACTAM 4.5 G: 4; .5 INJECTION, POWDER, FOR SOLUTION INTRAVENOUS at 03:38

## 2018-01-01 RX ADMIN — PRAVASTATIN SODIUM 80 MG: 40 TABLET ORAL at 10:28

## 2018-01-01 RX ADMIN — LACTULOSE 20 G: 10 SOLUTION ORAL at 10:57

## 2018-01-01 RX ADMIN — FERROUS SULFATE TAB 325 MG (65 MG ELEMENTAL FE) 325 MG: 325 (65 FE) TAB at 20:50

## 2018-01-01 RX ADMIN — PRAVASTATIN SODIUM 80 MG: 40 TABLET ORAL at 09:10

## 2018-01-01 RX ADMIN — LACTULOSE 20 G: 20 SOLUTION ORAL at 16:24

## 2018-01-01 RX ADMIN — LACTULOSE 60 ML: 10 SOLUTION ORAL at 09:00

## 2018-01-01 RX ADMIN — OMEPRAZOLE 40 MG: 20 CAPSULE, DELAYED RELEASE ORAL at 09:13

## 2018-01-01 RX ADMIN — LEVETIRACETAM 750 MG: 750 TABLET, FILM COATED ORAL at 19:49

## 2018-01-01 RX ADMIN — THIAMINE HYDROCHLORIDE 500 MG: 100 INJECTION, SOLUTION INTRAMUSCULAR; INTRAVENOUS at 16:05

## 2018-01-01 RX ADMIN — RIFAXIMIN 550 MG: 550 TABLET ORAL at 20:42

## 2018-01-01 RX ADMIN — IOPAMIDOL 118 ML: 755 INJECTION, SOLUTION INTRAVENOUS at 12:46

## 2018-01-01 RX ADMIN — MICONAZOLE NITRATE: 20 CREAM TOPICAL at 20:00

## 2018-01-01 RX ADMIN — CALCIUM CARBONATE-VITAMIN D TAB 500 MG-200 UNIT 1 TABLET: 500-200 TAB at 09:20

## 2018-01-01 RX ADMIN — MICONAZOLE NITRATE: 20 CREAM TOPICAL at 09:32

## 2018-01-01 RX ADMIN — THIAMINE HYDROCHLORIDE 100 MG: 100 INJECTION, SOLUTION INTRAMUSCULAR; INTRAVENOUS at 08:49

## 2018-01-01 RX ADMIN — RIFAXIMIN 550 MG: 550 TABLET ORAL at 08:01

## 2018-01-01 RX ADMIN — RIFAXIMIN 550 MG: 550 TABLET ORAL at 20:19

## 2018-01-01 RX ADMIN — CALCIUM CARBONATE-VITAMIN D TAB 500 MG-200 UNIT 1 TABLET: 500-200 TAB at 09:22

## 2018-01-01 RX ADMIN — RIFAXIMIN 550 MG: 550 TABLET ORAL at 20:30

## 2018-01-01 RX ADMIN — FERROUS SULFATE TAB 325 MG (65 MG ELEMENTAL FE) 325 MG: 325 (65 FE) TAB at 09:20

## 2018-01-01 RX ADMIN — FERROUS SULFATE TAB 325 MG (65 MG ELEMENTAL FE) 325 MG: 325 (65 FE) TAB at 09:14

## 2018-01-01 RX ADMIN — MIDAZOLAM 1 MG: 1 INJECTION INTRAMUSCULAR; INTRAVENOUS at 11:52

## 2018-01-01 RX ADMIN — LORAZEPAM 2 MG: 2 INJECTION INTRAMUSCULAR; INTRAVENOUS at 13:45

## 2018-01-01 RX ADMIN — SODIUM CHLORIDE, POTASSIUM CHLORIDE, SODIUM LACTATE AND CALCIUM CHLORIDE: 600; 310; 30; 20 INJECTION, SOLUTION INTRAVENOUS at 09:12

## 2018-01-01 RX ADMIN — LACTULOSE 60 ML: 10 SOLUTION ORAL at 17:48

## 2018-01-01 RX ADMIN — CICLOPIROX OLAMINE: 7.7 CREAM TOPICAL at 19:51

## 2018-01-01 RX ADMIN — PRAVASTATIN SODIUM 80 MG: 40 TABLET ORAL at 09:16

## 2018-01-01 RX ADMIN — MICONAZOLE NITRATE: 20 CREAM TOPICAL at 08:04

## 2018-01-01 RX ADMIN — Medication 5 ML: at 08:01

## 2018-01-01 RX ADMIN — MICONAZOLE NITRATE: 2 POWDER TOPICAL at 09:03

## 2018-01-01 RX ADMIN — CARBAMAZEPINE 200 MG: 200 TABLET ORAL at 08:59

## 2018-01-01 RX ADMIN — LACTULOSE 60 ML: 10 SOLUTION ORAL at 09:13

## 2018-01-01 RX ADMIN — OMEPRAZOLE 40 MG: 20 CAPSULE, DELAYED RELEASE ORAL at 10:00

## 2018-01-01 RX ADMIN — MULTIPLE VITAMINS W/ MINERALS TAB 1 TABLET: TAB at 18:30

## 2018-01-01 RX ADMIN — MIDAZOLAM 1 MG: 1 INJECTION INTRAMUSCULAR; INTRAVENOUS at 09:18

## 2018-01-01 RX ADMIN — ZINC SULFATE CAP 220 MG (50 MG ELEMENTAL ZN) 220 MG: 220 (50 ZN) CAP at 09:26

## 2018-01-01 RX ADMIN — CEFTRIAXONE SODIUM 2 G: 2 INJECTION, POWDER, FOR SOLUTION INTRAMUSCULAR; INTRAVENOUS at 04:06

## 2018-01-01 RX ADMIN — FERROUS SULFATE TAB 325 MG (65 MG ELEMENTAL FE) 325 MG: 325 (65 FE) TAB at 12:08

## 2018-01-01 RX ADMIN — CARBAMAZEPINE 200 MG: 200 TABLET ORAL at 21:20

## 2018-01-01 RX ADMIN — LACTULOSE 100 G: 10 SOLUTION ORAL; RECTAL at 09:23

## 2018-01-01 RX ADMIN — VITAMIN D, TAB 1000IU (100/BT) 1000 UNITS: 25 TAB at 08:58

## 2018-01-01 RX ADMIN — PRAVASTATIN SODIUM 80 MG: 40 TABLET ORAL at 09:51

## 2018-01-01 RX ADMIN — HYDROXYZINE HYDROCHLORIDE 25 MG: 25 TABLET ORAL at 20:19

## 2018-01-01 RX ADMIN — SUGAMMADEX 200 MG: 100 INJECTION, SOLUTION INTRAVENOUS at 10:40

## 2018-01-01 RX ADMIN — CALCIUM CARBONATE-VITAMIN D TAB 500 MG-200 UNIT 1 TABLET: 500-200 TAB at 13:11

## 2018-01-01 RX ADMIN — FUROSEMIDE 20 MG: 20 TABLET ORAL at 09:17

## 2018-01-01 RX ADMIN — CYANOCOBALAMIN TAB 1000 MCG 1000 MCG: 1000 TAB at 09:10

## 2018-01-01 RX ADMIN — VITAMIN D, TAB 1000IU (100/BT) 1000 UNITS: 25 TAB at 09:14

## 2018-01-01 RX ADMIN — FUROSEMIDE 20 MG: 20 TABLET ORAL at 09:23

## 2018-01-01 RX ADMIN — FOLIC ACID 1 MG: 5 INJECTION, SOLUTION INTRAMUSCULAR; INTRAVENOUS; SUBCUTANEOUS at 10:56

## 2018-01-01 RX ADMIN — FUROSEMIDE 20 MG: 20 TABLET ORAL at 08:07

## 2018-01-01 RX ADMIN — RIFAXIMIN 550 MG: 550 TABLET ORAL at 22:22

## 2018-01-01 RX ADMIN — VANCOMYCIN HYDROCHLORIDE 1500 MG: 10 INJECTION, POWDER, LYOPHILIZED, FOR SOLUTION INTRAVENOUS at 04:42

## 2018-01-01 RX ADMIN — FUROSEMIDE 20 MG: 20 TABLET ORAL at 09:14

## 2018-01-01 RX ADMIN — RIFAXIMIN 550 MG: 550 TABLET ORAL at 22:18

## 2018-01-01 RX ADMIN — CICLOPIROX OLAMINE: 7.7 CREAM TOPICAL at 08:29

## 2018-01-01 RX ADMIN — LACTULOSE 20 G: 20 SOLUTION ORAL at 06:32

## 2018-01-01 RX ADMIN — Medication 5 ML: at 12:44

## 2018-01-01 RX ADMIN — FERROUS SULFATE TAB 325 MG (65 MG ELEMENTAL FE) 325 MG: 325 (65 FE) TAB at 10:29

## 2018-01-01 RX ADMIN — Medication 100 MG: at 11:16

## 2018-01-01 RX ADMIN — SPIRONOLACTONE 25 MG: 25 TABLET ORAL at 09:22

## 2018-01-01 RX ADMIN — OMEPRAZOLE 40 MG: 20 CAPSULE, DELAYED RELEASE ORAL at 17:11

## 2018-01-01 RX ADMIN — FUROSEMIDE 20 MG: 20 TABLET ORAL at 12:08

## 2018-01-01 RX ADMIN — TRANEXAMIC ACID 1 G: 100 INJECTION, SOLUTION INTRAVENOUS at 01:09

## 2018-01-01 RX ADMIN — METHYLPHENIDATE HYDROCHLORIDE 10 MG: 10 TABLET ORAL at 10:51

## 2018-01-01 RX ADMIN — MIRTAZAPINE 45 MG: 15 TABLET, FILM COATED ORAL at 22:22

## 2018-01-01 RX ADMIN — ONDANSETRON 4 MG: 2 INJECTION INTRAMUSCULAR; INTRAVENOUS at 12:45

## 2018-01-01 RX ADMIN — LEVOTHYROXINE SODIUM 88 MCG: 88 TABLET ORAL at 08:24

## 2018-01-01 RX ADMIN — OMEPRAZOLE 40 MG: 20 CAPSULE, DELAYED RELEASE ORAL at 15:35

## 2018-01-01 RX ADMIN — HEPARIN 5 ML: 100 SYRINGE at 12:42

## 2018-01-01 RX ADMIN — SODIUM CHLORIDE, POTASSIUM CHLORIDE, SODIUM LACTATE AND CALCIUM CHLORIDE: 600; 310; 30; 20 INJECTION, SOLUTION INTRAVENOUS at 09:50

## 2018-01-01 RX ADMIN — SPIRONOLACTONE 25 MG: 25 TABLET ORAL at 09:13

## 2018-01-01 RX ADMIN — THIAMINE HYDROCHLORIDE 500 MG: 100 INJECTION, SOLUTION INTRAMUSCULAR; INTRAVENOUS at 09:24

## 2018-01-01 RX ADMIN — LACTULOSE 20 G: 10 SOLUTION ORAL at 13:45

## 2018-01-01 RX ADMIN — Medication 100 MG: at 08:01

## 2018-01-01 RX ADMIN — ALBUMIN HUMAN 75 G: 0.25 SOLUTION INTRAVENOUS at 12:32

## 2018-01-01 RX ADMIN — FERROUS SULFATE TAB 325 MG (65 MG ELEMENTAL FE) 325 MG: 325 (65 FE) TAB at 09:13

## 2018-01-01 RX ADMIN — POTASSIUM CHLORIDE 60 MEQ: 750 TABLET, EXTENDED RELEASE ORAL at 10:55

## 2018-01-01 RX ADMIN — Medication 100 MG: at 09:20

## 2018-01-01 RX ADMIN — LACTULOSE 20 G: 20 SOLUTION ORAL at 08:59

## 2018-01-01 RX ADMIN — LEVOTHYROXINE SODIUM 88 MCG: 88 TABLET ORAL at 12:07

## 2018-01-01 RX ADMIN — Medication 100 MG: at 09:13

## 2018-01-01 RX ADMIN — GABAPENTIN 100 MG: 100 CAPSULE ORAL at 22:41

## 2018-01-01 RX ADMIN — ZINC SULFATE CAP 220 MG (50 MG ELEMENTAL ZN) 220 MG: 220 (50 ZN) CAP at 09:50

## 2018-01-01 RX ADMIN — MIRTAZAPINE 45 MG: 15 TABLET, FILM COATED ORAL at 00:02

## 2018-01-01 RX ADMIN — RIFAXIMIN 550 MG: 550 TABLET ORAL at 12:07

## 2018-01-01 RX ADMIN — Medication 150 MG: at 22:19

## 2018-01-01 RX ADMIN — FERROUS SULFATE TAB 325 MG (65 MG ELEMENTAL FE) 325 MG: 325 (65 FE) TAB at 09:17

## 2018-01-01 RX ADMIN — CARBAMAZEPINE 200 MG: 100 TABLET, CHEWABLE ORAL at 09:23

## 2018-01-01 RX ADMIN — LACTULOSE 40 G: 10 SOLUTION ORAL at 15:46

## 2018-01-01 RX ADMIN — LEVETIRACETAM 750 MG: 750 TABLET, FILM COATED ORAL at 22:18

## 2018-01-01 RX ADMIN — OMEPRAZOLE 40 MG: 20 CAPSULE, DELAYED RELEASE ORAL at 09:30

## 2018-01-01 RX ADMIN — FOLIC ACID 1 MG: 1 TABLET ORAL at 09:10

## 2018-01-01 RX ADMIN — VITAMIN D, TAB 1000IU (100/BT) 1000 UNITS: 25 TAB at 07:53

## 2018-01-01 RX ADMIN — LEVETIRACETAM 750 MG: 750 TABLET, FILM COATED ORAL at 09:26

## 2018-01-01 RX ADMIN — FUROSEMIDE 20 MG: 20 TABLET ORAL at 09:25

## 2018-01-01 RX ADMIN — VITAMIN D, TAB 1000IU (100/BT) 1000 UNITS: 25 TAB at 12:08

## 2018-01-01 RX ADMIN — CYANOCOBALAMIN TAB 1000 MCG 1000 MCG: 1000 TAB at 08:57

## 2018-01-01 RX ADMIN — LACTULOSE 20 G: 20 SOLUTION ORAL at 01:55

## 2018-01-01 RX ADMIN — GADOBUTROL 7.4 ML: 604.72 INJECTION INTRAVENOUS at 12:38

## 2018-01-01 RX ADMIN — MIRTAZAPINE 45 MG: 45 TABLET, FILM COATED ORAL at 21:42

## 2018-01-01 RX ADMIN — OMEPRAZOLE 40 MG: 20 CAPSULE, DELAYED RELEASE ORAL at 20:19

## 2018-01-01 RX ADMIN — FUROSEMIDE 20 MG: 20 TABLET ORAL at 12:54

## 2018-01-01 RX ADMIN — SPIRONOLACTONE 25 MG: 25 TABLET ORAL at 13:58

## 2018-01-01 RX ADMIN — RIFAXIMIN 550 MG: 550 TABLET ORAL at 09:38

## 2018-01-01 RX ADMIN — Medication 5 ML: at 09:18

## 2018-01-01 RX ADMIN — CALCIUM CARBONATE-VITAMIN D TAB 500 MG-200 UNIT 1 TABLET: 500-200 TAB at 08:28

## 2018-01-01 RX ADMIN — OMEPRAZOLE 40 MG: 20 CAPSULE, DELAYED RELEASE ORAL at 17:25

## 2018-01-01 RX ADMIN — PRAVASTATIN SODIUM 80 MG: 40 TABLET ORAL at 08:28

## 2018-01-01 RX ADMIN — FERROUS SULFATE TAB 325 MG (65 MG ELEMENTAL FE) 325 MG: 325 (65 FE) TAB at 08:57

## 2018-01-01 RX ADMIN — CEFTRIAXONE 1 G: 1 INJECTION, POWDER, FOR SOLUTION INTRAMUSCULAR; INTRAVENOUS at 01:27

## 2018-01-01 RX ADMIN — RIFAXIMIN 550 MG: 550 TABLET ORAL at 09:31

## 2018-01-01 RX ADMIN — SODIUM CHLORIDE, PRESERVATIVE FREE 5 ML: 5 INJECTION INTRAVENOUS at 09:52

## 2018-01-01 RX ADMIN — PRAVASTATIN SODIUM 80 MG: 40 TABLET ORAL at 12:08

## 2018-01-01 RX ADMIN — Medication 100 MG: at 09:18

## 2018-01-01 RX ADMIN — SODIUM CHLORIDE, POTASSIUM CHLORIDE, SODIUM LACTATE AND CALCIUM CHLORIDE 1000 ML: 600; 310; 30; 20 INJECTION, SOLUTION INTRAVENOUS at 20:13

## 2018-01-01 RX ADMIN — MIRTAZAPINE 45 MG: 15 TABLET, FILM COATED ORAL at 22:35

## 2018-01-01 RX ADMIN — PRAVASTATIN SODIUM 80 MG: 40 TABLET ORAL at 18:52

## 2018-01-01 RX ADMIN — FERROUS SULFATE TAB 325 MG (65 MG ELEMENTAL FE) 325 MG: 325 (65 FE) TAB at 09:31

## 2018-01-01 RX ADMIN — METHYLPHENIDATE HYDROCHLORIDE 10 MG: 10 TABLET ORAL at 09:34

## 2018-01-01 RX ADMIN — METHYLPHENIDATE HYDROCHLORIDE 10 MG: 10 TABLET ORAL at 17:49

## 2018-01-01 RX ADMIN — Medication 3 ML: at 16:29

## 2018-01-01 RX ADMIN — CYANOCOBALAMIN TAB 1000 MCG 1000 MCG: 1000 TAB at 09:26

## 2018-01-01 RX ADMIN — FERROUS SULFATE TAB 325 MG (65 MG ELEMENTAL FE) 325 MG: 325 (65 FE) TAB at 20:42

## 2018-01-01 RX ADMIN — SODIUM CHLORIDE: 9 INJECTION, SOLUTION INTRAVENOUS at 11:44

## 2018-01-01 RX ADMIN — LEVOFLOXACIN 500 MG: 5 INJECTION, SOLUTION INTRAVENOUS at 12:57

## 2018-01-01 RX ADMIN — LACTULOSE 20 G: 20 SOLUTION ORAL at 13:47

## 2018-01-01 RX ADMIN — OMEPRAZOLE 40 MG: 20 CAPSULE, DELAYED RELEASE ORAL at 08:56

## 2018-01-01 RX ADMIN — LEVETIRACETAM 750 MG: 750 TABLET, FILM COATED ORAL at 19:47

## 2018-01-01 RX ADMIN — SENNOSIDES AND DOCUSATE SODIUM 1 TABLET: 8.6; 5 TABLET ORAL at 09:18

## 2018-01-01 RX ADMIN — FUROSEMIDE 20 MG: 20 TABLET ORAL at 09:27

## 2018-01-01 RX ADMIN — OMEPRAZOLE 40 MG: 20 CAPSULE, DELAYED RELEASE ORAL at 09:22

## 2018-01-01 RX ADMIN — METHYLPHENIDATE HYDROCHLORIDE 10 MG: 10 TABLET ORAL at 20:33

## 2018-01-01 RX ADMIN — LACTULOSE 20 G: 20 SOLUTION ORAL at 04:07

## 2018-01-01 RX ADMIN — CALCIUM CARBONATE-VITAMIN D TAB 500 MG-200 UNIT 1 TABLET: 500-200 TAB at 08:58

## 2018-01-01 RX ADMIN — MIRTAZAPINE 45 MG: 45 TABLET, FILM COATED ORAL at 01:18

## 2018-01-01 RX ADMIN — FOLIC ACID 1 MG: 1 TABLET ORAL at 09:20

## 2018-01-01 RX ADMIN — LEVOTHYROXINE SODIUM 88 MCG: 88 TABLET ORAL at 10:01

## 2018-01-01 RX ADMIN — FERROUS SULFATE TAB 325 MG (65 MG ELEMENTAL FE) 325 MG: 325 (65 FE) TAB at 22:42

## 2018-01-01 RX ADMIN — LEVETIRACETAM 750 MG: 750 TABLET, FILM COATED ORAL at 22:41

## 2018-01-01 RX ADMIN — CALCIUM CARBONATE-VITAMIN D TAB 500 MG-200 UNIT 1 TABLET: 500-200 TAB at 09:27

## 2018-01-01 RX ADMIN — FERROUS SULFATE TAB 325 MG (65 MG ELEMENTAL FE) 325 MG: 325 (65 FE) TAB at 09:34

## 2018-01-01 RX ADMIN — SPIRONOLACTONE 50 MG: 50 TABLET ORAL at 08:28

## 2018-01-01 RX ADMIN — Medication 5 MG: at 12:17

## 2018-01-01 RX ADMIN — FOLIC ACID 1 MG: 1 TABLET ORAL at 08:29

## 2018-01-01 RX ADMIN — ALBUMIN HUMAN 75 G: 0.25 SOLUTION INTRAVENOUS at 08:15

## 2018-01-01 RX ADMIN — VITAMIN D, TAB 1000IU (100/BT) 1000 UNITS: 25 TAB at 08:31

## 2018-01-01 RX ADMIN — ROCURONIUM BROMIDE 65 MG: 10 INJECTION INTRAVENOUS at 12:02

## 2018-01-01 RX ADMIN — MIRTAZAPINE 45 MG: 30 TABLET, FILM COATED ORAL at 21:39

## 2018-01-01 RX ADMIN — ALBUMIN HUMAN 25 G: 0.25 SOLUTION INTRAVENOUS at 18:20

## 2018-01-01 RX ADMIN — LACTULOSE 60 ML: 10 SOLUTION ORAL at 14:13

## 2018-01-01 RX ADMIN — PRAVASTATIN SODIUM 80 MG: 40 TABLET ORAL at 09:21

## 2018-01-01 RX ADMIN — MULTIPLE VITAMINS W/ MINERALS TAB 1 TABLET: TAB at 17:00

## 2018-01-01 RX ADMIN — FOLIC ACID 1 MG: 1 TABLET ORAL at 09:21

## 2018-01-01 RX ADMIN — MICONAZOLE NITRATE: 20 CREAM TOPICAL at 21:07

## 2018-01-01 RX ADMIN — RIFAXIMIN 550 MG: 550 TABLET ORAL at 21:36

## 2018-01-01 RX ADMIN — RIFAXIMIN 550 MG: 550 TABLET ORAL at 19:41

## 2018-01-01 RX ADMIN — FERROUS SULFATE TAB 325 MG (65 MG ELEMENTAL FE) 325 MG: 325 (65 FE) TAB at 20:45

## 2018-01-01 RX ADMIN — MULTIPLE VITAMINS W/ MINERALS TAB 1 TABLET: TAB at 18:09

## 2018-01-01 RX ADMIN — PHENYLEPHRINE HYDROCHLORIDE 100 MCG: 10 INJECTION, SOLUTION INTRAMUSCULAR; INTRAVENOUS; SUBCUTANEOUS at 12:02

## 2018-01-01 RX ADMIN — Medication 100 MG: at 09:07

## 2018-01-01 RX ADMIN — METHYLPHENIDATE HYDROCHLORIDE 10 MG: 10 TABLET ORAL at 16:02

## 2018-01-01 RX ADMIN — PIPERACILLIN AND TAZOBACTAM 4.5 G: 4; .5 INJECTION, POWDER, FOR SOLUTION INTRAVENOUS at 22:34

## 2018-01-01 RX ADMIN — LACTULOSE 20 G: 20 SOLUTION ORAL at 06:13

## 2018-01-01 RX ADMIN — HYDROXYZINE HYDROCHLORIDE 25 MG: 25 TABLET ORAL at 19:31

## 2018-01-01 RX ADMIN — PROPOFOL 200 MG: 10 INJECTION, EMULSION INTRAVENOUS at 09:18

## 2018-01-01 RX ADMIN — LEVOTHYROXINE SODIUM 88 MCG: 88 TABLET ORAL at 07:53

## 2018-01-01 RX ADMIN — METHYLPHENIDATE HYDROCHLORIDE 10 MG: 10 TABLET ORAL at 21:10

## 2018-01-01 RX ADMIN — LACTULOSE 20 G: 20 SOLUTION ORAL at 18:59

## 2018-01-01 RX ADMIN — AMOXICILLIN AND CLAVULANATE POTASSIUM 1 TABLET: 875; 125 TABLET, FILM COATED ORAL at 08:01

## 2018-01-01 RX ADMIN — LACTULOSE 30 ML: 10 SOLUTION ORAL at 17:00

## 2018-01-01 RX ADMIN — Medication 5 ML: at 05:42

## 2018-01-01 RX ADMIN — CARBAMAZEPINE 200 MG: 100 TABLET, CHEWABLE ORAL at 09:13

## 2018-01-01 RX ADMIN — Medication 100 MG: at 12:07

## 2018-01-01 RX ADMIN — FUROSEMIDE 20 MG: 20 TABLET ORAL at 09:46

## 2018-01-01 RX ADMIN — LACTULOSE 20 G: 20 SOLUTION ORAL at 13:48

## 2018-01-01 RX ADMIN — GABAPENTIN 100 MG: 100 CAPSULE ORAL at 22:24

## 2018-01-01 RX ADMIN — CYANOCOBALAMIN TAB 1000 MCG 1000 MCG: 1000 TAB at 12:08

## 2018-01-01 RX ADMIN — METHYLPHENIDATE HYDROCHLORIDE 10 MG: 10 TABLET ORAL at 20:20

## 2018-01-01 RX ADMIN — METHYLPHENIDATE HYDROCHLORIDE 10 MG: 10 TABLET ORAL at 13:28

## 2018-01-01 RX ADMIN — MICONAZOLE NITRATE: 20 CREAM TOPICAL at 11:09

## 2018-01-01 RX ADMIN — MULTIPLE VITAMINS W/ MINERALS TAB 1 TABLET: TAB at 17:25

## 2018-01-01 RX ADMIN — MULTIPLE VITAMINS W/ MINERALS TAB 1 TABLET: TAB at 11:16

## 2018-01-01 RX ADMIN — GABAPENTIN 100 MG: 100 CAPSULE ORAL at 01:54

## 2018-01-01 RX ADMIN — FUROSEMIDE 20 MG: 20 TABLET ORAL at 13:58

## 2018-01-01 RX ADMIN — OMEPRAZOLE 40 MG: 20 CAPSULE, DELAYED RELEASE ORAL at 12:07

## 2018-01-01 RX ADMIN — OYSTER SHELL CALCIUM WITH VITAMIN D 1 TABLET: 500; 200 TABLET, FILM COATED ORAL at 08:38

## 2018-01-01 RX ADMIN — CALCIUM CARBONATE-VITAMIN D TAB 500 MG-200 UNIT 1 TABLET: 500-200 TAB at 09:14

## 2018-01-01 RX ADMIN — LIDOCAINE HYDROCHLORIDE 60 MG: 20 INJECTION, SOLUTION INFILTRATION; PERINEURAL at 12:02

## 2018-01-01 RX ADMIN — POTASSIUM CHLORIDE 20 MEQ: 750 TABLET, EXTENDED RELEASE ORAL at 06:13

## 2018-01-01 RX ADMIN — LACTULOSE 60 ML: 10 SOLUTION ORAL at 14:02

## 2018-01-01 RX ADMIN — LEVOTHYROXINE SODIUM 88 MCG: 88 TABLET ORAL at 10:29

## 2018-01-01 RX ADMIN — GABAPENTIN 100 MG: 100 CAPSULE ORAL at 21:30

## 2018-01-01 RX ADMIN — SPIRONOLACTONE 25 MG: 25 TABLET ORAL at 10:52

## 2018-01-01 RX ADMIN — CARBAMAZEPINE 200 MG: 200 TABLET ORAL at 20:55

## 2018-01-01 RX ADMIN — SIMETHICONE 2 ML: 63.3; 3.7 SOLUTION/ DROPS ORAL at 11:38

## 2018-01-01 RX ADMIN — FERROUS SULFATE TAB 325 MG (65 MG ELEMENTAL FE) 325 MG: 325 (65 FE) TAB at 11:58

## 2018-01-01 RX ADMIN — RIFAXIMIN 550 MG: 550 TABLET ORAL at 08:38

## 2018-01-01 RX ADMIN — LACTULOSE 60 ML: 10 SOLUTION ORAL at 14:46

## 2018-01-01 RX ADMIN — MICONAZOLE NITRATE: 20 CREAM TOPICAL at 12:06

## 2018-01-01 RX ADMIN — Medication 5 ML: at 04:39

## 2018-01-01 RX ADMIN — LEVOTHYROXINE SODIUM 88 MCG: 88 TABLET ORAL at 09:20

## 2018-01-01 RX ADMIN — CARBAMAZEPINE 200 MG: 200 TABLET ORAL at 08:28

## 2018-01-01 RX ADMIN — LACTULOSE 20 G: 20 POWDER, FOR SOLUTION ORAL at 12:08

## 2018-01-01 RX ADMIN — METHYLPHENIDATE HYDROCHLORIDE 10 MG: 10 TABLET ORAL at 09:26

## 2018-01-01 RX ADMIN — CEFTRIAXONE SODIUM 2 G: 2 INJECTION, POWDER, FOR SOLUTION INTRAMUSCULAR; INTRAVENOUS at 03:08

## 2018-01-01 RX ADMIN — Medication 100 MG: at 08:39

## 2018-01-01 RX ADMIN — PRAVASTATIN SODIUM 80 MG: 40 TABLET ORAL at 10:01

## 2018-01-01 RX ADMIN — Medication 5 MG: at 12:07

## 2018-01-01 RX ADMIN — LACTULOSE 100 G: 10 SOLUTION ORAL; RECTAL at 04:08

## 2018-01-01 RX ADMIN — CALCIUM CARBONATE-VITAMIN D TAB 500 MG-200 UNIT 1 TABLET: 500-200 TAB at 18:52

## 2018-01-01 RX ADMIN — LACTULOSE 20 G: 20 SOLUTION ORAL at 14:55

## 2018-01-01 RX ADMIN — OMEPRAZOLE 40 MG: 20 CAPSULE, DELAYED RELEASE ORAL at 20:33

## 2018-01-01 RX ADMIN — METHYLPHENIDATE HYDROCHLORIDE 10 MG: 10 TABLET ORAL at 11:17

## 2018-01-01 RX ADMIN — MIRTAZAPINE 45 MG: 15 TABLET, FILM COATED ORAL at 01:53

## 2018-01-01 RX ADMIN — LACTULOSE 20 G: 10 SOLUTION ORAL at 09:16

## 2018-01-01 RX ADMIN — CYANOCOBALAMIN TAB 1000 MCG 1000 MCG: 1000 TAB at 08:01

## 2018-01-01 RX ADMIN — RIFAXIMIN 550 MG: 550 TABLET ORAL at 21:10

## 2018-01-01 RX ADMIN — CALCIUM CARBONATE-VITAMIN D TAB 500 MG-200 UNIT 1 TABLET: 500-200 TAB at 08:15

## 2018-01-01 RX ADMIN — SUGAMMADEX 200 MG: 100 INJECTION, SOLUTION INTRAVENOUS at 13:18

## 2018-01-01 RX ADMIN — METHYLPHENIDATE HYDROCHLORIDE 10 MG: 10 TABLET ORAL at 14:46

## 2018-01-01 RX ADMIN — Medication 150 MG: at 19:50

## 2018-01-01 RX ADMIN — MULTIPLE VITAMINS W/ MINERALS TAB 1 TABLET: TAB at 08:28

## 2018-01-01 RX ADMIN — RIFAXIMIN 550 MG: 550 TABLET ORAL at 19:48

## 2018-01-01 RX ADMIN — IOPAMIDOL 58 ML: 755 INJECTION, SOLUTION INTRAVENOUS at 20:53

## 2018-01-01 RX ADMIN — OMEPRAZOLE 40 MG: 20 CAPSULE, DELAYED RELEASE ORAL at 08:58

## 2018-01-01 RX ADMIN — OYSTER SHELL CALCIUM WITH VITAMIN D 1 TABLET: 500; 200 TABLET, FILM COATED ORAL at 10:01

## 2018-01-01 RX ADMIN — RIFAXIMIN 550 MG: 550 TABLET ORAL at 08:07

## 2018-01-01 RX ADMIN — LACTULOSE 60 ML: 10 SOLUTION ORAL at 13:32

## 2018-01-01 RX ADMIN — Medication 100 MG: at 07:53

## 2018-01-01 RX ADMIN — LACTULOSE 20 G: 10 SOLUTION ORAL at 13:28

## 2018-01-01 RX ADMIN — METHYLPHENIDATE HYDROCHLORIDE 10 MG: 10 TABLET ORAL at 08:01

## 2018-01-01 RX ADMIN — PRAVASTATIN SODIUM 80 MG: 40 TABLET ORAL at 09:19

## 2018-01-01 RX ADMIN — LACTULOSE 100 G: 10 SOLUTION ORAL; RECTAL at 02:49

## 2018-01-01 RX ADMIN — METHYLPHENIDATE HYDROCHLORIDE 10 MG: 10 TABLET ORAL at 07:52

## 2018-01-01 RX ADMIN — SODIUM CHLORIDE, PRESERVATIVE FREE 5 ML: 5 INJECTION INTRAVENOUS at 08:00

## 2018-01-01 RX ADMIN — MICONAZOLE NITRATE: 2 POWDER TOPICAL at 08:25

## 2018-01-01 RX ADMIN — OCTREOTIDE ACETATE 50 MCG/HR: 200 INJECTION, SOLUTION INTRAVENOUS; SUBCUTANEOUS at 01:27

## 2018-01-01 RX ADMIN — MIRTAZAPINE 45 MG: 15 TABLET, FILM COATED ORAL at 21:31

## 2018-01-01 RX ADMIN — PHYTONADIONE 10 MG: 5 TABLET ORAL at 09:22

## 2018-01-01 RX ADMIN — LEVOTHYROXINE SODIUM 88 MCG: 88 TABLET ORAL at 09:51

## 2018-01-01 RX ADMIN — OMEPRAZOLE 40 MG: 20 CAPSULE, DELAYED RELEASE ORAL at 10:29

## 2018-01-01 RX ADMIN — VITAMIN D, TAB 1000IU (100/BT) 1000 UNITS: 25 TAB at 09:51

## 2018-01-01 RX ADMIN — LACTULOSE 20 G: 20 SOLUTION ORAL at 12:30

## 2018-01-01 RX ADMIN — CARBAMAZEPINE 200 MG: 200 TABLET ORAL at 09:03

## 2018-01-01 RX ADMIN — RIFAXIMIN 550 MG: 550 TABLET ORAL at 10:28

## 2018-01-01 RX ADMIN — FERROUS SULFATE TAB 325 MG (65 MG ELEMENTAL FE) 325 MG: 325 (65 FE) TAB at 19:08

## 2018-01-01 RX ADMIN — FOLIC ACID 1 MG: 1 TABLET ORAL at 09:03

## 2018-01-01 RX ADMIN — MIRTAZAPINE 45 MG: 30 TABLET, FILM COATED ORAL at 22:22

## 2018-01-01 RX ADMIN — GABAPENTIN 100 MG: 100 CAPSULE ORAL at 21:35

## 2018-01-01 RX ADMIN — RIFAXIMIN 550 MG: 550 TABLET ORAL at 08:16

## 2018-01-01 RX ADMIN — SODIUM CHLORIDE, POTASSIUM CHLORIDE, SODIUM LACTATE AND CALCIUM CHLORIDE 1000 ML: 600; 310; 30; 20 INJECTION, SOLUTION INTRAVENOUS at 17:57

## 2018-01-01 RX ADMIN — OMEPRAZOLE 40 MG: 20 CAPSULE, DELAYED RELEASE ORAL at 21:00

## 2018-01-01 RX ADMIN — FUROSEMIDE 20 MG: 20 TABLET ORAL at 09:20

## 2018-01-01 RX ADMIN — CARBAMAZEPINE 200 MG: 200 TABLET ORAL at 12:09

## 2018-01-01 RX ADMIN — CEFTRIAXONE SODIUM 2 G: 2 INJECTION, POWDER, FOR SOLUTION INTRAMUSCULAR; INTRAVENOUS at 10:05

## 2018-01-01 RX ADMIN — MIRTAZAPINE 45 MG: 45 TABLET, FILM COATED ORAL at 22:28

## 2018-01-01 RX ADMIN — HYDROXYZINE HYDROCHLORIDE 25 MG: 25 TABLET ORAL at 20:30

## 2018-01-01 RX ADMIN — RIFAXIMIN 550 MG: 550 TABLET ORAL at 07:53

## 2018-01-01 RX ADMIN — LACTULOSE 20 G: 20 SOLUTION ORAL at 03:43

## 2018-01-01 RX ADMIN — FERROUS SULFATE TAB 325 MG (65 MG ELEMENTAL FE) 325 MG: 325 (65 FE) TAB at 22:18

## 2018-01-01 RX ADMIN — LACTULOSE 20 G: 20 SOLUTION ORAL at 20:54

## 2018-01-01 RX ADMIN — Medication 150 MG: at 22:22

## 2018-01-01 RX ADMIN — LEVOTHYROXINE SODIUM 88 MCG: 88 TABLET ORAL at 09:29

## 2018-01-01 RX ADMIN — LEVOTHYROXINE SODIUM 88 MCG: 88 TABLET ORAL at 09:07

## 2018-01-01 RX ADMIN — GABAPENTIN 100 MG: 100 CAPSULE ORAL at 22:23

## 2018-01-01 RX ADMIN — LACTULOSE 20 G: 10 SOLUTION ORAL at 13:11

## 2018-01-01 RX ADMIN — LACTULOSE 20 G: 20 SOLUTION ORAL at 22:01

## 2018-01-01 RX ADMIN — PRAVASTATIN SODIUM 80 MG: 40 TABLET ORAL at 09:02

## 2018-01-01 RX ADMIN — MULTIPLE VITAMINS W/ MINERALS TAB 1 TABLET: TAB at 12:07

## 2018-01-01 RX ADMIN — LEVOTHYROXINE SODIUM 88 MCG: 88 TABLET ORAL at 08:40

## 2018-01-01 RX ADMIN — Medication 100 MG: at 09:11

## 2018-01-01 RX ADMIN — CALCIUM CARBONATE-VITAMIN D TAB 500 MG-200 UNIT 1 TABLET: 500-200 TAB at 11:53

## 2018-01-01 RX ADMIN — FERROUS SULFATE TAB 325 MG (65 MG ELEMENTAL FE) 325 MG: 325 (65 FE) TAB at 21:40

## 2018-01-01 RX ADMIN — GABAPENTIN 100 MG: 100 CAPSULE ORAL at 22:28

## 2018-01-01 RX ADMIN — MULTIPLE VITAMINS W/ MINERALS TAB 1 TABLET: TAB at 17:07

## 2018-01-01 RX ADMIN — LEVETIRACETAM 750 MG: 750 TABLET, FILM COATED ORAL at 21:39

## 2018-01-01 RX ADMIN — SENNOSIDES AND DOCUSATE SODIUM 1 TABLET: 8.6; 5 TABLET ORAL at 09:01

## 2018-01-01 RX ADMIN — OMEPRAZOLE 40 MG: 20 CAPSULE, DELAYED RELEASE ORAL at 19:56

## 2018-01-01 RX ADMIN — LACTULOSE 20 G: 20 SOLUTION ORAL at 10:47

## 2018-01-01 RX ADMIN — MICONAZOLE NITRATE: 2 POWDER TOPICAL at 19:48

## 2018-01-01 RX ADMIN — Medication 150 MG: at 21:39

## 2018-01-01 RX ADMIN — CARBAMAZEPINE 200 MG: 100 TABLET, CHEWABLE ORAL at 12:08

## 2018-01-01 RX ADMIN — PIPERACILLIN AND TAZOBACTAM 4.5 G: 4; .5 INJECTION, POWDER, FOR SOLUTION INTRAVENOUS at 22:07

## 2018-01-01 RX ADMIN — PIPERACILLIN AND TAZOBACTAM 4.5 G: 4; .5 INJECTION, POWDER, FOR SOLUTION INTRAVENOUS at 16:59

## 2018-01-01 RX ADMIN — Medication 100 MG: at 09:26

## 2018-01-01 RX ADMIN — SPIRONOLACTONE 50 MG: 50 TABLET ORAL at 08:07

## 2018-01-01 RX ADMIN — FOLIC ACID 1 MG: 1 TABLET ORAL at 08:24

## 2018-01-01 RX ADMIN — LEVOTHYROXINE SODIUM 88 MCG: 88 TABLET ORAL at 08:32

## 2018-01-01 RX ADMIN — CALCIUM CARBONATE-VITAMIN D TAB 500 MG-200 UNIT 1 TABLET: 500-200 TAB at 09:13

## 2018-01-01 RX ADMIN — SODIUM CHLORIDE, PRESERVATIVE FREE 5 ML: 5 INJECTION INTRAVENOUS at 11:17

## 2018-01-01 RX ADMIN — LACTULOSE 20 G: 20 SOLUTION ORAL at 03:22

## 2018-01-01 RX ADMIN — OMEPRAZOLE 40 MG: 20 CAPSULE, DELAYED RELEASE ORAL at 16:25

## 2018-01-01 RX ADMIN — SODIUM CHLORIDE 8 MG/HR: 9 INJECTION, SOLUTION INTRAVENOUS at 10:46

## 2018-01-01 RX ADMIN — Medication 100 MG: at 08:31

## 2018-01-01 RX ADMIN — LACTULOSE 30 ML: 10 SOLUTION ORAL at 09:20

## 2018-01-01 RX ADMIN — METHYLPHENIDATE HYDROCHLORIDE 10 MG: 10 TABLET ORAL at 08:32

## 2018-01-01 RX ADMIN — LEVETIRACETAM 750 MG: 750 TABLET, FILM COATED ORAL at 20:33

## 2018-01-01 RX ADMIN — POTASSIUM CHLORIDE 20 MEQ: 750 TABLET, EXTENDED RELEASE ORAL at 14:07

## 2018-01-01 RX ADMIN — FUROSEMIDE 20 MG: 20 TABLET ORAL at 09:10

## 2018-01-01 RX ADMIN — CYANOCOBALAMIN TAB 1000 MCG 1000 MCG: 1000 TAB at 08:15

## 2018-01-01 RX ADMIN — VITAMIN D, TAB 1000IU (100/BT) 1000 UNITS: 25 TAB at 08:07

## 2018-01-01 RX ADMIN — OMEPRAZOLE 40 MG: 20 CAPSULE, DELAYED RELEASE ORAL at 09:17

## 2018-01-01 RX ADMIN — SPIRONOLACTONE 50 MG: 50 TABLET ORAL at 09:23

## 2018-01-01 RX ADMIN — LACTULOSE 20 G: 20 SOLUTION ORAL at 13:14

## 2018-01-01 RX ADMIN — SUGAMMADEX 200 MG: 100 INJECTION, SOLUTION INTRAVENOUS at 09:51

## 2018-01-01 RX ADMIN — RIFAXIMIN 550 MG: 550 TABLET ORAL at 09:00

## 2018-01-01 RX ADMIN — PRAVASTATIN SODIUM 80 MG: 40 TABLET ORAL at 09:17

## 2018-01-01 RX ADMIN — Medication 150 MG: at 22:42

## 2018-01-01 RX ADMIN — EXTENDED PHENYTOIN SODIUM 120 MG: 30 CAPSULE ORAL at 10:28

## 2018-01-01 RX ADMIN — LEVETIRACETAM 750 MG: 750 TABLET, FILM COATED ORAL at 11:15

## 2018-01-01 RX ADMIN — CARBAMAZEPINE 200 MG: 200 TABLET ORAL at 08:24

## 2018-01-01 RX ADMIN — CALCIUM CARBONATE-VITAMIN D TAB 500 MG-200 UNIT 1 TABLET: 500-200 TAB at 09:18

## 2018-01-01 RX ADMIN — CALCIUM CARBONATE-VITAMIN D TAB 500 MG-200 UNIT 1 TABLET: 500-200 TAB at 09:38

## 2018-01-01 RX ADMIN — LACTULOSE 20 G: 20 SOLUTION ORAL at 06:12

## 2018-01-01 RX ADMIN — LACTULOSE 20 G: 20 SOLUTION ORAL at 15:29

## 2018-01-01 RX ADMIN — FOLIC ACID 1 MG: 1 TABLET ORAL at 08:15

## 2018-01-01 RX ADMIN — METHYLPHENIDATE HYDROCHLORIDE 10 MG: 10 TABLET ORAL at 10:28

## 2018-01-01 RX ADMIN — Medication 100 MG: at 09:39

## 2018-01-01 RX ADMIN — PIPERACILLIN AND TAZOBACTAM 4.5 G: 4; .5 INJECTION, POWDER, FOR SOLUTION INTRAVENOUS at 04:06

## 2018-01-01 RX ADMIN — LACTULOSE 60 ML: 10 SOLUTION ORAL at 13:59

## 2018-01-01 RX ADMIN — SODIUM CHLORIDE, PRESERVATIVE FREE 5 ML: 5 INJECTION INTRAVENOUS at 10:07

## 2018-01-01 RX ADMIN — LACTULOSE 20 G: 10 SOLUTION ORAL at 13:17

## 2018-01-01 RX ADMIN — ZINC SULFATE CAP 220 MG (50 MG ELEMENTAL ZN) 220 MG: 220 (50 ZN) CAP at 12:07

## 2018-01-01 RX ADMIN — OMEPRAZOLE 40 MG: 20 CAPSULE, DELAYED RELEASE ORAL at 09:38

## 2018-01-01 RX ADMIN — VITAMIN D, TAB 1000IU (100/BT) 1000 UNITS: 25 TAB at 09:19

## 2018-01-01 RX ADMIN — FERROUS SULFATE TAB 325 MG (65 MG ELEMENTAL FE) 325 MG: 325 (65 FE) TAB at 13:09

## 2018-01-01 RX ADMIN — METHYLPHENIDATE HYDROCHLORIDE 10 MG: 10 TABLET ORAL at 13:58

## 2018-01-01 RX ADMIN — OMEPRAZOLE 40 MG: 20 CAPSULE, DELAYED RELEASE ORAL at 16:27

## 2018-01-01 RX ADMIN — CARBAMAZEPINE 200 MG: 200 TABLET ORAL at 21:11

## 2018-01-01 RX ADMIN — LACTULOSE 60 ML: 10 SOLUTION ORAL at 09:20

## 2018-01-01 RX ADMIN — RIFAXIMIN 550 MG: 550 TABLET ORAL at 19:51

## 2018-01-01 RX ADMIN — OMEPRAZOLE 40 MG: 20 CAPSULE, DELAYED RELEASE ORAL at 16:08

## 2018-01-01 RX ADMIN — LEVOTHYROXINE SODIUM 88 MCG: 88 TABLET ORAL at 08:07

## 2018-01-01 RX ADMIN — METHYLPHENIDATE HYDROCHLORIDE 10 MG: 10 TABLET ORAL at 13:43

## 2018-01-01 RX ADMIN — OMEPRAZOLE 40 MG: 20 CAPSULE, DELAYED RELEASE ORAL at 09:19

## 2018-01-01 RX ADMIN — MIRTAZAPINE 45 MG: 30 TABLET, FILM COATED ORAL at 22:41

## 2018-01-01 RX ADMIN — OMEPRAZOLE 40 MG: 20 CAPSULE, DELAYED RELEASE ORAL at 09:27

## 2018-01-01 RX ADMIN — SODIUM CHLORIDE, PRESERVATIVE FREE 5 ML: 5 INJECTION INTRAVENOUS at 09:13

## 2018-01-01 RX ADMIN — VITAMIN D, TAB 1000IU (100/BT) 1000 UNITS: 25 TAB at 08:59

## 2018-01-01 RX ADMIN — PRAVASTATIN SODIUM 80 MG: 40 TABLET ORAL at 08:58

## 2018-01-01 RX ADMIN — LACTULOSE 60 ML: 10 SOLUTION ORAL at 09:27

## 2018-01-01 RX ADMIN — CARBAMAZEPINE 200 MG: 200 TABLET ORAL at 08:01

## 2018-01-01 RX ADMIN — FUROSEMIDE 20 MG: 20 TABLET ORAL at 08:14

## 2018-01-01 RX ADMIN — MIRTAZAPINE 45 MG: 15 TABLET, FILM COATED ORAL at 22:41

## 2018-01-01 RX ADMIN — ALBUMIN HUMAN 75 G: 0.25 SOLUTION INTRAVENOUS at 10:14

## 2018-01-01 RX ADMIN — FUROSEMIDE 20 MG: 20 TABLET ORAL at 11:15

## 2018-01-01 RX ADMIN — CICLOPIROX OLAMINE: 7.7 CREAM TOPICAL at 19:48

## 2018-01-01 RX ADMIN — METHYLPHENIDATE HYDROCHLORIDE 10 MG: 10 TABLET ORAL at 13:42

## 2018-01-01 RX ADMIN — LACTULOSE 5 G: 20 SOLUTION ORAL at 02:08

## 2018-01-01 RX ADMIN — OMEPRAZOLE 40 MG: 20 CAPSULE, DELAYED RELEASE ORAL at 16:04

## 2018-01-01 RX ADMIN — Medication 2 G: at 10:56

## 2018-01-01 RX ADMIN — OMEPRAZOLE 40 MG: 20 CAPSULE, DELAYED RELEASE ORAL at 08:28

## 2018-01-01 RX ADMIN — Medication 100 MG: at 09:23

## 2018-01-01 RX ADMIN — CICLOPIROX OLAMINE: 7.7 CREAM TOPICAL at 08:25

## 2018-01-01 RX ADMIN — OMEPRAZOLE 40 MG: 20 CAPSULE, DELAYED RELEASE ORAL at 17:07

## 2018-01-01 RX ADMIN — ZINC SULFATE CAP 220 MG (50 MG ELEMENTAL ZN) 220 MG: 220 (50 ZN) CAP at 09:39

## 2018-01-01 RX ADMIN — MICONAZOLE NITRATE: 2 POWDER TOPICAL at 08:29

## 2018-01-01 RX ADMIN — HYDROXYZINE HYDROCHLORIDE 25 MG: 25 TABLET ORAL at 20:55

## 2018-01-01 RX ADMIN — PRAVASTATIN SODIUM 80 MG: 40 TABLET ORAL at 09:22

## 2018-01-01 RX ADMIN — OYSTER SHELL CALCIUM WITH VITAMIN D 1 TABLET: 500; 200 TABLET, FILM COATED ORAL at 08:07

## 2018-01-01 RX ADMIN — EXTENDED PHENYTOIN SODIUM 120 MG: 30 CAPSULE ORAL at 19:52

## 2018-01-01 RX ADMIN — CALCIUM CARBONATE-VITAMIN D TAB 500 MG-200 UNIT 1 TABLET: 500-200 TAB at 09:30

## 2018-01-01 RX ADMIN — VITAMIN D, TAB 1000IU (100/BT) 1000 UNITS: 25 TAB at 11:16

## 2018-01-01 RX ADMIN — LEVOTHYROXINE SODIUM 88 MCG: 88 TABLET ORAL at 08:57

## 2018-01-01 RX ADMIN — GABAPENTIN 100 MG: 100 CAPSULE ORAL at 21:20

## 2018-01-01 RX ADMIN — LEVETIRACETAM 750 MG: 750 TABLET, FILM COATED ORAL at 12:07

## 2018-01-01 RX ADMIN — RIFAXIMIN 550 MG: 550 TABLET ORAL at 09:06

## 2018-01-01 RX ADMIN — LACTULOSE 20 G: 10 SOLUTION ORAL at 09:30

## 2018-01-01 RX ADMIN — EXTENDED PHENYTOIN SODIUM 120 MG: 30 CAPSULE ORAL at 09:29

## 2018-01-01 RX ADMIN — GABAPENTIN 100 MG: 100 CAPSULE ORAL at 22:02

## 2018-01-01 RX ADMIN — Medication 1000 MCG: at 10:01

## 2018-01-01 RX ADMIN — MIRTAZAPINE 45 MG: 15 TABLET, FILM COATED ORAL at 22:28

## 2018-01-01 RX ADMIN — RIFAXIMIN 550 MG: 550 TABLET ORAL at 08:24

## 2018-01-01 RX ADMIN — ZINC SULFATE CAP 220 MG (50 MG ELEMENTAL ZN) 220 MG: 220 (50 ZN) CAP at 11:16

## 2018-01-01 RX ADMIN — LEVOTHYROXINE SODIUM 88 MCG: 88 TABLET ORAL at 18:53

## 2018-01-01 RX ADMIN — Medication 100 MG: at 09:52

## 2018-01-01 RX ADMIN — FUROSEMIDE 20 MG: 20 TABLET ORAL at 09:51

## 2018-01-01 RX ADMIN — CALCIUM CARBONATE-VITAMIN D TAB 500 MG-200 UNIT 1 TABLET: 500-200 TAB at 12:55

## 2018-01-01 RX ADMIN — HYDROXYZINE HYDROCHLORIDE 25 MG: 25 TABLET ORAL at 21:10

## 2018-01-01 RX ADMIN — CARBAMAZEPINE 200 MG: 200 TABLET ORAL at 09:09

## 2018-01-01 RX ADMIN — LACTULOSE 60 ML: 10 SOLUTION ORAL at 09:50

## 2018-01-01 RX ADMIN — FENTANYL CITRATE 100 MCG: 50 INJECTION, SOLUTION INTRAMUSCULAR; INTRAVENOUS at 12:02

## 2018-01-01 RX ADMIN — Medication 100 MG: at 08:24

## 2018-01-01 RX ADMIN — FERROUS SULFATE TAB 325 MG (65 MG ELEMENTAL FE) 325 MG: 325 (65 FE) TAB at 09:18

## 2018-01-01 RX ADMIN — SPIRONOLACTONE 50 MG: 50 TABLET ORAL at 08:39

## 2018-01-01 RX ADMIN — LACTULOSE 30 ML: 10 SOLUTION ORAL at 12:07

## 2018-01-01 RX ADMIN — SODIUM CHLORIDE, PRESERVATIVE FREE 5 ML: 5 INJECTION INTRAVENOUS at 18:59

## 2018-01-01 RX ADMIN — OMEPRAZOLE 40 MG: 20 CAPSULE, DELAYED RELEASE ORAL at 08:24

## 2018-01-01 RX ADMIN — Medication 100 MG: at 09:33

## 2018-01-01 RX ADMIN — POTASSIUM CHLORIDE 20 MEQ: 750 TABLET, EXTENDED RELEASE ORAL at 20:19

## 2018-01-01 RX ADMIN — VITAMIN D, TAB 1000IU (100/BT) 1000 UNITS: 25 TAB at 09:39

## 2018-01-01 RX ADMIN — POTASSIUM CHLORIDE 40 MEQ: 750 TABLET, EXTENDED RELEASE ORAL at 12:08

## 2018-01-01 RX ADMIN — CARBAMAZEPINE 200 MG: 200 TABLET ORAL at 19:49

## 2018-01-01 RX ADMIN — LEVOTHYROXINE SODIUM 88 MCG: 88 TABLET ORAL at 09:38

## 2018-01-01 RX ADMIN — LACTULOSE 20 G: 20 SOLUTION ORAL at 19:51

## 2018-01-01 RX ADMIN — CYANOCOBALAMIN TAB 1000 MCG 1000 MCG: 1000 TAB at 09:00

## 2018-01-01 RX ADMIN — MULTIPLE VITAMINS W/ MINERALS TAB 1 TABLET: TAB at 09:10

## 2018-01-01 RX ADMIN — CALCIUM CARBONATE-VITAMIN D TAB 500 MG-200 UNIT 1 TABLET: 500-200 TAB at 09:00

## 2018-01-01 RX ADMIN — RIFAXIMIN 550 MG: 550 TABLET ORAL at 09:02

## 2018-01-01 RX ADMIN — OMEPRAZOLE 40 MG: 20 CAPSULE, DELAYED RELEASE ORAL at 07:53

## 2018-01-01 RX ADMIN — HYDROXYZINE HYDROCHLORIDE 25 MG: 25 TABLET ORAL at 08:07

## 2018-01-01 RX ADMIN — METHYLPHENIDATE HYDROCHLORIDE 10 MG: 10 TABLET ORAL at 10:11

## 2018-01-01 RX ADMIN — OMEPRAZOLE 40 MG: 20 CAPSULE, DELAYED RELEASE ORAL at 20:30

## 2018-01-01 RX ADMIN — SODIUM CHLORIDE, POTASSIUM CHLORIDE, SODIUM LACTATE AND CALCIUM CHLORIDE 1000 ML: 600; 310; 30; 20 INJECTION, SOLUTION INTRAVENOUS at 10:56

## 2018-01-01 RX ADMIN — ROCURONIUM BROMIDE 70 MG: 10 INJECTION INTRAVENOUS at 12:00

## 2018-01-01 RX ADMIN — Medication 100 MG: at 09:02

## 2018-01-01 RX ADMIN — Medication 5 ML: at 13:49

## 2018-01-01 RX ADMIN — Medication 150 MG: at 19:56

## 2018-01-01 RX ADMIN — METHYLPHENIDATE HYDROCHLORIDE 10 MG: 10 TABLET ORAL at 14:02

## 2018-01-01 RX ADMIN — LEVETIRACETAM 750 MG: 750 TABLET, FILM COATED ORAL at 19:54

## 2018-01-01 RX ADMIN — CEFTRIAXONE SODIUM 2 G: 2 INJECTION, POWDER, FOR SOLUTION INTRAMUSCULAR; INTRAVENOUS at 02:44

## 2018-01-01 RX ADMIN — LACTULOSE 20 G: 20 SOLUTION ORAL at 01:54

## 2018-01-01 RX ADMIN — RIFAXIMIN 550 MG: 550 TABLET ORAL at 21:41

## 2018-01-01 RX ADMIN — GADOBUTROL 9 ML: 604.72 INJECTION INTRAVENOUS at 09:49

## 2018-01-01 RX ADMIN — OMEPRAZOLE 40 MG: 20 CAPSULE, DELAYED RELEASE ORAL at 09:09

## 2018-01-01 RX ADMIN — FERROUS SULFATE TAB 325 MG (65 MG ELEMENTAL FE) 325 MG: 325 (65 FE) TAB at 08:59

## 2018-01-01 RX ADMIN — LEVETIRACETAM 750 MG: 750 TABLET, FILM COATED ORAL at 22:22

## 2018-01-01 RX ADMIN — SIMETHICONE 4 ML: 63.3; 3.7 SOLUTION/ DROPS ORAL at 09:38

## 2018-01-01 RX ADMIN — PRAVASTATIN SODIUM 80 MG: 40 TABLET ORAL at 11:15

## 2018-01-01 RX ADMIN — SODIUM CHLORIDE, PRESERVATIVE FREE 5 ML: 5 INJECTION INTRAVENOUS at 09:27

## 2018-01-01 RX ADMIN — OMEPRAZOLE 40 MG: 20 CAPSULE, DELAYED RELEASE ORAL at 15:59

## 2018-01-01 RX ADMIN — LEVOTHYROXINE SODIUM 88 MCG: 88 TABLET ORAL at 09:11

## 2018-01-01 RX ADMIN — PROPOFOL 170 MG: 10 INJECTION, EMULSION INTRAVENOUS at 12:02

## 2018-01-01 RX ADMIN — LEVETIRACETAM 750 MG: 750 TABLET, FILM COATED ORAL at 19:41

## 2018-01-01 RX ADMIN — LACTULOSE 20 G: 10 SOLUTION ORAL at 07:52

## 2018-01-01 RX ADMIN — DIPHENHYDRAMINE HYDROCHLORIDE 50 MG: 50 INJECTION, SOLUTION INTRAMUSCULAR; INTRAVENOUS at 16:03

## 2018-01-01 RX ADMIN — PRAVASTATIN SODIUM 80 MG: 40 TABLET ORAL at 09:31

## 2018-01-01 RX ADMIN — METHYLPHENIDATE HYDROCHLORIDE 10 MG: 10 TABLET ORAL at 19:56

## 2018-01-01 RX ADMIN — MIRTAZAPINE 45 MG: 30 TABLET, FILM COATED ORAL at 22:02

## 2018-01-01 RX ADMIN — ONDANSETRON 4 MG: 2 INJECTION INTRAMUSCULAR; INTRAVENOUS at 09:51

## 2018-01-01 RX ADMIN — LEVOFLOXACIN 500 MG: 500 TABLET, FILM COATED ORAL at 09:07

## 2018-01-01 RX ADMIN — CEFTRIAXONE SODIUM 2 G: 2 INJECTION, POWDER, FOR SOLUTION INTRAMUSCULAR; INTRAVENOUS at 04:35

## 2018-01-01 RX ADMIN — OMEPRAZOLE 40 MG: 20 CAPSULE, DELAYED RELEASE ORAL at 09:06

## 2018-01-01 RX ADMIN — METHYLPHENIDATE HYDROCHLORIDE 10 MG: 10 TABLET ORAL at 09:52

## 2018-01-01 RX ADMIN — CALCIUM CARBONATE-VITAMIN D TAB 500 MG-200 UNIT 1 TABLET: 500-200 TAB at 13:17

## 2018-01-01 RX ADMIN — RIFAXIMIN 550 MG: 550 TABLET ORAL at 21:12

## 2018-01-01 RX ADMIN — POTASSIUM CHLORIDE 40 MEQ: 750 TABLET, EXTENDED RELEASE ORAL at 04:07

## 2018-01-01 RX ADMIN — FERROUS SULFATE TAB 325 MG (65 MG ELEMENTAL FE) 325 MG: 325 (65 FE) TAB at 19:54

## 2018-01-01 RX ADMIN — CEFTRIAXONE SODIUM 2 G: 2 INJECTION, POWDER, FOR SOLUTION INTRAMUSCULAR; INTRAVENOUS at 02:43

## 2018-01-01 RX ADMIN — OMEPRAZOLE 40 MG: 20 CAPSULE, DELAYED RELEASE ORAL at 16:24

## 2018-01-01 RX ADMIN — OMEPRAZOLE 40 MG: 20 CAPSULE, DELAYED RELEASE ORAL at 17:00

## 2018-01-01 RX ADMIN — OCTREOTIDE ACETATE 50 MCG: 50 INJECTION, SOLUTION INTRAVENOUS; SUBCUTANEOUS at 01:23

## 2018-01-01 RX ADMIN — METHYLPHENIDATE HYDROCHLORIDE 10 MG: 10 TABLET ORAL at 14:13

## 2018-01-01 RX ADMIN — FERROUS SULFATE TAB 325 MG (65 MG ELEMENTAL FE) 325 MG: 325 (65 FE) TAB at 20:00

## 2018-01-01 RX ADMIN — FOLIC ACID 1 MG: 1 TABLET ORAL at 09:07

## 2018-01-01 ASSESSMENT — ENCOUNTER SYMPTOMS
APPETITE CHANGE: 1
CONSTIPATION: 0
HALLUCINATIONS: 0
SNORES LOUDLY: 0
FEVER: 0
MUSCLE CRAMPS: 1
WEAKNESS: 1
DECREASED APPETITE: 1
ABDOMINAL PAIN: 0
MYALGIAS: 1
DISTURBANCES IN COORDINATION: 1
INCREASED ENERGY: 1
PARALYSIS: 0
POLYDIPSIA: 1
WEAKNESS: 1
MYALGIAS: 1
ACTIVITY CHANGE: 1
TINGLING: 0
SPUTUM PRODUCTION: 0
MUSCLE WEAKNESS: 1
JAUNDICE: 0
NIGHT SWEATS: 0
COUGH DISTURBING SLEEP: 0
SYNCOPE: 0
SHORTNESS OF BREATH: 0
DISTURBANCES IN COORDINATION: 1
FATIGUE: 0
ORTHOPNEA: 0
PALPITATIONS: 0
DECREASED CONCENTRATION: 0
LIGHT-HEADEDNESS: 1
CONSTIPATION: 0
ALTERED TEMPERATURE REGULATION: 1
SEIZURES: 0
NERVOUS/ANXIOUS: 0
PARALYSIS: 0
WEIGHT GAIN: 0
DYSPNEA ON EXERTION: 0
CONSTIPATION: 0
WHEEZING: 0
FEVER: 0
ABDOMINAL PAIN: 0
SEIZURES: 1
ANAL BLEEDING: 0
POSTURAL DYSPNEA: 0
NECK STIFFNESS: 0
DIARRHEA: 1
BLOATING: 0
TREMORS: 0
FATIGUE: 1
CONFUSION: 1
TINGLING: 0
POLYPHAGIA: 0
DISTURBANCES IN COORDINATION: 1
ORTHOPNEA: 0
COUGH: 1
MEMORY LOSS: 0
ARTHRALGIAS: 0
ABDOMINAL DISTENTION: 1
NECK PAIN: 0
MUSCLE CRAMPS: 1
STIFFNESS: 0
BLOOD IN STOOL: 0
NECK PAIN: 0
HEADACHES: 0
ABDOMINAL PAIN: 0
CHILLS: 0
FEVER: 0
SLEEP DISTURBANCES DUE TO BREATHING: 0
SHORTNESS OF BREATH: 0
SPEECH CHANGE: 0
JOINT SWELLING: 0
TREMORS: 0
WEIGHT LOSS: 0
LOSS OF CONSCIOUSNESS: 0
INCREASED ENERGY: 1
STIFFNESS: 0
NECK PAIN: 0
BLOOD IN STOOL: 0
HALLUCINATIONS: 0
FATIGUE: 0
FEVER: 0
HEARTBURN: 0
VOMITING: 0
BOWEL INCONTINENCE: 1
POLYDIPSIA: 0
NAUSEA: 0
TREMORS: 0
ABDOMINAL PAIN: 0
LOSS OF CONSCIOUSNESS: 0
BLOOD IN STOOL: 0
DIZZINESS: 1
COUGH: 1
BACK PAIN: 0
POSTURAL DYSPNEA: 0
DYSRHYTHMIAS: 0
NERVOUS/ANXIOUS: 0
SEIZURES: 0
RECTAL PAIN: 0
DYSURIA: 0
WHEEZING: 0
BACK PAIN: 0
DEPRESSION: 1
INSOMNIA: 0
SEIZURES: 1
NUMBNESS: 0
DIARRHEA: 1
JOINT SWELLING: 0
PARALYSIS: 0
HEADACHES: 0
HYPERTENSION: 0
HALLUCINATIONS: 0
MUSCLE WEAKNESS: 1
POLYDIPSIA: 1
JOINT SWELLING: 0
LEG PAIN: 0
SPEECH CHANGE: 1
DIZZINESS: 1
JAUNDICE: 0
HEADACHES: 0
WEAKNESS: 1
ALTERED TEMPERATURE REGULATION: 1
DEPRESSION: 1
NUMBNESS: 0
SPEECH CHANGE: 1
DECREASED CONCENTRATION: 0
ARTHRALGIAS: 0
HEMOPTYSIS: 0
DECREASED CONCENTRATION: 0
INSOMNIA: 0
NAUSEA: 0
NERVOUS/ANXIOUS: 0
PANIC: 0
NAUSEA: 0
COLOR CHANGE: 1
FEVER: 0
HEARTBURN: 0
STIFFNESS: 0
VOMITING: 0
INSOMNIA: 0
COUGH DISTURBING SLEEP: 0
CHILLS: 0
ARTHRALGIAS: 0
SEIZURES: 1
POLYPHAGIA: 0
WEIGHT GAIN: 0
FATIGUE: 1
NIGHT SWEATS: 0
PANIC: 0
SHORTNESS OF BREATH: 0
WEIGHT LOSS: 1
FATIGUE: 1
NUMBNESS: 0
SNORES LOUDLY: 0
CHILLS: 0
HYPOTENSION: 0
MUSCLE CRAMPS: 1
BLOATING: 0
RECTAL PAIN: 0
WEAKNESS: 1
VOMITING: 0
CHILLS: 0
WEAKNESS: 1
POLYPHAGIA: 0
CONFUSION: 1
WEIGHT LOSS: 0
CHILLS: 0
INCREASED ENERGY: 1
DECREASED APPETITE: 1
BOWEL INCONTINENCE: 1
SEIZURES: 0
DIZZINESS: 1
LOSS OF CONSCIOUSNESS: 0
FEVER: 1
MEMORY LOSS: 0
WEAKNESS: 1
CONFUSION: 1
HEADACHES: 1
ALTERED TEMPERATURE REGULATION: 1
PANIC: 0
DIARRHEA: 1
MEMORY LOSS: 0
WEAKNESS: 1
DEPRESSION: 1
EXERCISE INTOLERANCE: 0
MUSCLE WEAKNESS: 1
BLOOD IN STOOL: 0
DIARRHEA: 1
BACK PAIN: 0
HEMOPTYSIS: 0
WEIGHT GAIN: 0
HYPOTENSION: 0
BACK PAIN: 0
COUGH: 1
TINGLING: 0
DYSPNEA ON EXERTION: 0
SPUTUM PRODUCTION: 0
ORTHOPNEA: 0
MYALGIAS: 1
FEVER: 0
DECREASED APPETITE: 1
NIGHT SWEATS: 0

## 2018-01-01 ASSESSMENT — ACTIVITIES OF DAILY LIVING (ADL)
ADLS_ACUITY_SCORE: 28
ADLS_ACUITY_SCORE: 29
ADLS_ACUITY_SCORE: 22
ADLS_ACUITY_SCORE: 19
ADLS_ACUITY_SCORE: 24
ADLS_ACUITY_SCORE: 25
ADLS_ACUITY_SCORE: 15.5
ADLS_ACUITY_SCORE: 25
ADLS_ACUITY_SCORE: 22
ADLS_ACUITY_SCORE: 22
ADLS_ACUITY_SCORE: 24
ADLS_ACUITY_SCORE: 28
ADLS_ACUITY_SCORE: 30
ADLS_ACUITY_SCORE: 28
ADLS_ACUITY_SCORE: 25
ADLS_ACUITY_SCORE: 22
ADLS_ACUITY_SCORE: 30
ADLS_ACUITY_SCORE: 22
ADLS_ACUITY_SCORE: 28
ADLS_ACUITY_SCORE: 25
ADLS_ACUITY_SCORE: 25
AMBULATION: 3-->ASSISTIVE EQUIPMENT AND PERSON
ADLS_ACUITY_SCORE: 22
ADLS_ACUITY_SCORE: 25
ADLS_ACUITY_SCORE: 27
ADLS_ACUITY_SCORE: 27
ADLS_ACUITY_SCORE: 24
ADLS_ACUITY_SCORE: 27
ADLS_ACUITY_SCORE: 28
ADLS_ACUITY_SCORE: 27
ADLS_ACUITY_SCORE: 30
RETIRED_COMMUNICATION: 2-->DIFFICULTY UNDERSTANDING (NOT RELATED TO LANGUAGE BARRIER)
ADLS_ACUITY_SCORE: 24
ADLS_ACUITY_SCORE: 25
ADLS_ACUITY_SCORE: 27
ADLS_ACUITY_SCORE: 22
ADLS_ACUITY_SCORE: 25
ADLS_ACUITY_SCORE: 28
COGNITION: 1 - ATTENTION OR MEMORY DEFICITS
ADLS_ACUITY_SCORE: 28
ADLS_ACUITY_SCORE: 27
ADLS_ACUITY_SCORE: 25
ADLS_ACUITY_SCORE: 27
ADLS_ACUITY_SCORE: 26
ADLS_ACUITY_SCORE: 28
ADLS_ACUITY_SCORE: 26
ADLS_ACUITY_SCORE: 28
NUMBER_OF_TIMES_PATIENT_HAS_FALLEN_WITHIN_LAST_SIX_MONTHS: 20
ADLS_ACUITY_SCORE: 24
ADLS_ACUITY_SCORE: 25
ADLS_ACUITY_SCORE: 28
ADLS_ACUITY_SCORE: 25
ADLS_ACUITY_SCORE: 25
ADLS_ACUITY_SCORE: 27
ADLS_ACUITY_SCORE: 25
ADLS_ACUITY_SCORE: 23
ADLS_ACUITY_SCORE: 25
RETIRED_EATING: 0-->INDEPENDENT
ADLS_ACUITY_SCORE: 19
ADLS_ACUITY_SCORE: 22
ADLS_ACUITY_SCORE: 25
ADLS_ACUITY_SCORE: 24
ADLS_ACUITY_SCORE: 15.5
ADLS_ACUITY_SCORE: 30
ADLS_ACUITY_SCORE: 22
ADLS_ACUITY_SCORE: 24
ADLS_ACUITY_SCORE: 28
PREVIOUS_RESPONSIBILITIES: MEAL PREP
SWALLOWING: 0-->SWALLOWS FOODS/LIQUIDS WITHOUT DIFFICULTY
FALL_HISTORY_WITHIN_LAST_SIX_MONTHS: YES
ADLS_ACUITY_SCORE: 22
WHICH_OF_THE_ABOVE_FUNCTIONAL_RISKS_HAD_A_RECENT_ONSET_OR_CHANGE?: COGNITION
ADLS_ACUITY_SCORE: 25
TRANSFERRING: 3-->ASSISTIVE EQUIPMENT AND PERSON
ADLS_ACUITY_SCORE: 27
ADLS_ACUITY_SCORE: 27
ADLS_ACUITY_SCORE: 25
ADLS_ACUITY_SCORE: 26
ADLS_ACUITY_SCORE: 28
ADLS_ACUITY_SCORE: 28
ADLS_ACUITY_SCORE: 24
ADLS_ACUITY_SCORE: 25
ADLS_ACUITY_SCORE: 22
ADLS_ACUITY_SCORE: 28
ADLS_ACUITY_SCORE: 25
ADLS_ACUITY_SCORE: 28
ADLS_ACUITY_SCORE: 25
ADLS_ACUITY_SCORE: 28
ADLS_ACUITY_SCORE: 30
ADLS_ACUITY_SCORE: 25
ADLS_ACUITY_SCORE: 22
ADLS_ACUITY_SCORE: 23
ADLS_ACUITY_SCORE: 25
ADLS_ACUITY_SCORE: 25
ADLS_ACUITY_SCORE: 27
ADLS_ACUITY_SCORE: 29
ADLS_ACUITY_SCORE: 26
ADLS_ACUITY_SCORE: 28
ADLS_ACUITY_SCORE: 24
ADLS_ACUITY_SCORE: 27
ADLS_ACUITY_SCORE: 22
ADLS_ACUITY_SCORE: 27
ADLS_ACUITY_SCORE: 27
ADLS_ACUITY_SCORE: 25
ADLS_ACUITY_SCORE: 28
ADLS_ACUITY_SCORE: 25
ADLS_ACUITY_SCORE: 27
ADLS_ACUITY_SCORE: 22
ADLS_ACUITY_SCORE: 28
ADLS_ACUITY_SCORE: 30
ADLS_ACUITY_SCORE: 28
ADLS_ACUITY_SCORE: 24
ADLS_ACUITY_SCORE: 29
ADLS_ACUITY_SCORE: 19
ADLS_ACUITY_SCORE: 30
ADLS_ACUITY_SCORE: 22
ADLS_ACUITY_SCORE: 15.5
ADLS_ACUITY_SCORE: 26
ADLS_ACUITY_SCORE: 25
ADLS_ACUITY_SCORE: 25
ADLS_ACUITY_SCORE: 15.5
ADLS_ACUITY_SCORE: 27
ADLS_ACUITY_SCORE: 24
ADLS_ACUITY_SCORE: 24
ADLS_ACUITY_SCORE: 25
ADLS_ACUITY_SCORE: 27
ADLS_ACUITY_SCORE: 24
ADLS_ACUITY_SCORE: 27
ADLS_ACUITY_SCORE: 25
ADLS_ACUITY_SCORE: 25
ADLS_ACUITY_SCORE: 28
ADLS_ACUITY_SCORE: 30
ADLS_ACUITY_SCORE: 28
ADLS_ACUITY_SCORE: 25
ADLS_ACUITY_SCORE: 29
ADLS_ACUITY_SCORE: 19
ADLS_ACUITY_SCORE: 25
ADLS_ACUITY_SCORE: 25
ADLS_ACUITY_SCORE: 28
ADLS_ACUITY_SCORE: 27
ADLS_ACUITY_SCORE: 22
ADLS_ACUITY_SCORE: 25
ADLS_ACUITY_SCORE: 27
WHICH_OF_THE_ABOVE_FUNCTIONAL_RISKS_HAD_A_RECENT_ONSET_OR_CHANGE?: AMBULATION;TRANSFERRING;TOILETING;BATHING;DRESSING;COGNITION
ADLS_ACUITY_SCORE: 26
ADLS_ACUITY_SCORE: 26
ADLS_ACUITY_SCORE: 25
ADLS_ACUITY_SCORE: 22
ADLS_ACUITY_SCORE: 22
ADLS_ACUITY_SCORE: 30
ADLS_ACUITY_SCORE: 27
ADLS_ACUITY_SCORE: 25
ADLS_ACUITY_SCORE: 24
ADLS_ACUITY_SCORE: 24
ADLS_ACUITY_SCORE: 28
ADLS_ACUITY_SCORE: 27
ADLS_ACUITY_SCORE: 19
DRESS: 2-->ASSISTIVE PERSON
ADLS_ACUITY_SCORE: 15.5
ADLS_ACUITY_SCORE: 25
ADLS_ACUITY_SCORE: 27
ADLS_ACUITY_SCORE: 30
TOILETING: 3-->ASSISTIVE EQUIPMENT AND PERSON
ADLS_ACUITY_SCORE: 24
ADLS_ACUITY_SCORE: 26
ADLS_ACUITY_SCORE: 25
ADLS_ACUITY_SCORE: 28
ADLS_ACUITY_SCORE: 25
ADLS_ACUITY_SCORE: 25
ADLS_ACUITY_SCORE: 28
ADLS_ACUITY_SCORE: 27
ADLS_ACUITY_SCORE: 25
ADLS_ACUITY_SCORE: 27
ADLS_ACUITY_SCORE: 28
ADLS_ACUITY_SCORE: 15.5
ADLS_ACUITY_SCORE: 28
ADLS_ACUITY_SCORE: 25
ADLS_ACUITY_SCORE: 27
ADLS_ACUITY_SCORE: 23
ADLS_ACUITY_SCORE: 24
ADLS_ACUITY_SCORE: 19
ADLS_ACUITY_SCORE: 27
ADLS_ACUITY_SCORE: 27
ADLS_ACUITY_SCORE: 25
ADLS_ACUITY_SCORE: 26
ADLS_ACUITY_SCORE: 28
ADLS_ACUITY_SCORE: 25
ADLS_ACUITY_SCORE: 22
ADLS_ACUITY_SCORE: 27
ADLS_ACUITY_SCORE: 27
ADLS_ACUITY_SCORE: 22
ADLS_ACUITY_SCORE: 25
ADLS_ACUITY_SCORE: 24
ADLS_ACUITY_SCORE: 27
ADLS_ACUITY_SCORE: 25
BATHING: 2-->ASSISTIVE PERSON
ADLS_ACUITY_SCORE: 15.5
ADLS_ACUITY_SCORE: 19
ADLS_ACUITY_SCORE: 27
ADLS_ACUITY_SCORE: 25
ADLS_ACUITY_SCORE: 19
ADLS_ACUITY_SCORE: 15.5

## 2018-01-01 ASSESSMENT — PAIN SCALES - GENERAL
PAINLEVEL: NO PAIN (0)
PAINLEVEL: WORST PAIN (10)
PAINLEVEL: NO PAIN (0)

## 2018-01-01 ASSESSMENT — VISUAL ACUITY
OU: NORMAL ACUITY
OU: NORMAL ACUITY;GLASSES
OU: NORMAL ACUITY
OU: GLASSES;NORMAL ACUITY
OU: NORMAL ACUITY
OU: GLASSES;NORMAL ACUITY
OU: NORMAL ACUITY
OU: NORMAL ACUITY
OU: GLASSES;NORMAL ACUITY

## 2018-01-01 ASSESSMENT — LIFESTYLE VARIABLES
TOBACCO_USE: 0

## 2018-01-01 ASSESSMENT — COPD QUESTIONNAIRES: COPD: 0

## 2018-01-01 ASSESSMENT — PATIENT HEALTH QUESTIONNAIRE - PHQ9: SUM OF ALL RESPONSES TO PHQ QUESTIONS 1-9: 10

## 2018-01-02 ENCOUNTER — TELEPHONE (OUTPATIENT)
Dept: SURGERY | Facility: CLINIC | Age: 48
End: 2018-01-02

## 2018-01-02 ENCOUNTER — TELEPHONE (OUTPATIENT)
Dept: GASTROENTEROLOGY | Facility: CLINIC | Age: 48
End: 2018-01-02

## 2018-01-02 LAB
BACTERIA SPEC CULT: NORMAL
Lab: NORMAL
SPECIMEN SOURCE: NORMAL

## 2018-01-02 ASSESSMENT — ENCOUNTER SYMPTOMS
HEARTBURN: 0
JAUNDICE: 0
BRUISES/BLEEDS EASILY: 1
TINGLING: 0
SINUS CONGESTION: 1
BOWEL INCONTINENCE: 0
NECK MASS: 0
POSTURAL DYSPNEA: 0
SMELL DISTURBANCE: 0
SNORES LOUDLY: 0
PARALYSIS: 0
BLOATING: 0
BLOOD IN STOOL: 1
SPEECH CHANGE: 0
DISTURBANCES IN COORDINATION: 1
SHORTNESS OF BREATH: 0
SWOLLEN GLANDS: 0
DYSPNEA ON EXERTION: 0
SEIZURES: 0
HEMOPTYSIS: 0
DIARRHEA: 1
SPUTUM PRODUCTION: 0
WHEEZING: 0
COUGH DISTURBING SLEEP: 0
HOARSE VOICE: 0
WEAKNESS: 1
ABDOMINAL PAIN: 0
SORE THROAT: 1
TROUBLE SWALLOWING: 0
NAUSEA: 0
NUMBNESS: 0
CONSTIPATION: 0
RECTAL PAIN: 1
COUGH: 1
SINUS PAIN: 0
VOMITING: 0
DIZZINESS: 1
TREMORS: 0
TASTE DISTURBANCE: 0
HEADACHES: 0
LOSS OF CONSCIOUSNESS: 0
MEMORY LOSS: 0

## 2018-01-02 NOTE — TELEPHONE ENCOUNTER
----- Message -----      From: Idania Almanzar, RN      Sent: 1/2/2018  12:26 PM        To: Ari Giordano MD, Corine Holbrook, *   Subject: RE: Reschedule EGD                               HI   This patient has had all of his EGD with MAC down in the endoscopy unit  but they want him done in the OR now.     Liza, is there a reason why this needs to go to the OR now?     EGD for esophageal varices   History of depression.     Thanks   Idania   ----- Message -----      From: Liza Miller LPN      Sent: 1/2/2018  12:16 PM        To: Idania Almanzar, ELANA   Subject: Reschedule EGD                                   Priyanka Emerson,     Per Dr. Tanner and Anesthesia, please schedule this patient for EGD in OR with Dr. Dickens or , first available.       Thank you,     Liza Miller LPN   [facility] Hepatology   Phone: [phone]   Fax:  [phone]     Calling to facilitate the above request.   Patient's wife Yisel is informed that caridad is scheduled on 01/19/2018 at 1015 AM with an arrival time of 815 AM for an EGD procedure.  Patient had a pre-op done one week ago per his wife. She knows he will need to be monitored post procedure and will need a  to pick him up post.   Patient's wife did not need a procedure packet as she states that she has done this 4-5 times.   Direct line given to Yisel to call with any questions or concerns.      01.02.18 @ 326

## 2018-01-02 NOTE — TELEPHONE ENCOUNTER
Spoke with wife, confirmed appt tomorrow with Jody Ricks, NP @ 1:45pm on 1/3/18.  Judah LARIOS LPN

## 2018-01-03 ENCOUNTER — PRE VISIT (OUTPATIENT)
Dept: SURGERY | Facility: CLINIC | Age: 48
End: 2018-01-03

## 2018-01-03 ENCOUNTER — OFFICE VISIT (OUTPATIENT)
Dept: SURGERY | Facility: CLINIC | Age: 48
End: 2018-01-03
Payer: MEDICARE

## 2018-01-03 VITALS
BODY MASS INDEX: 31.52 KG/M2 | HEIGHT: 70 IN | DIASTOLIC BLOOD PRESSURE: 81 MMHG | OXYGEN SATURATION: 97 % | TEMPERATURE: 97.2 F | SYSTOLIC BLOOD PRESSURE: 138 MMHG | WEIGHT: 220.2 LBS | HEART RATE: 71 BPM

## 2018-01-03 DIAGNOSIS — K62.5 RECTAL BLEEDING: Primary | ICD-10-CM

## 2018-01-03 ASSESSMENT — PAIN SCALES - GENERAL: PAINLEVEL: SEVERE PAIN (6)

## 2018-01-03 NOTE — MR AVS SNAPSHOT
After Visit Summary   1/3/2018    Mono Varghese    MRN: [id number]           Patient Information     Date Of Birth          1970        Visit Information        Provider Department      1/3/2018 1:45 PM Jody Lin APRN [facility] Colon and Rectal Surgery        Today's Diagnoses     Rectal bleeding    -  1       Follow-ups after your visit        Your next 10 appointments already scheduled     Jan 19, 2018   Procedure with Guru Jose Kelly MD   [facility], [place], Same Day Surgery (--)    [address] [address]   [phone]            Jan 23, 2018  8:45 AM CST   Adult Med Follow UP with Devendra Almanzar MD   [facility] ([facility])    [place] [id number]  [address] [facility] [address] [phone]            Jan 23, 2018 10:30 AM CST   (Arrive by 10:15 AM)   Return Visit with King Louis MD   [facility] ([facility])    [address] [place] [address]  [address] [facility] [address] [phone]            Apr 17, 2018  9:00 AM CDT   Lab with  LAB   [facility] ([facility])    [address] [place] [id number] [phone]            Apr 17, 2018  9:40 AM CDT   (Arrive by 9:25 AM)   Return General Liver with Beatriz Tanner MD   [facility] Hepatology ([facility])    [address] [place] [address]  [address]  [facility] [id number] [phone]            Apr 27, 2018 11:30 AM CDT   (Arrive by 11:15 AM)   MR BRAIN W/O & W CONTRAST with [address] [facility] ([facility])    [address] [place] [id number] [phone]           Take your medicines as usual, unless your doctor tells you not to. Bring a list of your current medicines to your exam (including vitamins, minerals and over-the-counter  drugs).  You will be given intravenous contrast for this exam. To prepare:   The day before your exam, drink extra fluids at least six 8-ounce glasses (unless your doctor tells you to restrict your fluids).   Have a blood test (creatinine test) within 30 days of your exam. Go to your clinic or Diagnostic Imaging Department for this test.  The MRI machine uses a strong magnet. Please wear clothes without metal (snaps, zippers). A sweatsuit works well, or we may give you a hospital gown.  Please remove any body piercings and hair extensions before you arrive. You will also remove watches, jewelry, hairpins, wallets, dentures, partial dental plates and hearing aids. You may wear contact lenses, and you may be able to wear your rings. We have a safe place to keep your personal items, but it is safer to leave them at home.   **IMPORTANT** THE INSTRUCTIONS BELOW ARE ONLY FOR THOSE PATIENTS WHO HAVE BEEN TOLD THEY WILL RECEIVE SEDATION OR GENERAL ANESTHESIA DURING THEIR MRI PROCEDURE:  IF YOU WILL RECEIVE SEDATION (take medicine to help you relax during your exam):   You must get the medicine from your doctor before you arrive. Bring the medicine to the exam. Do not take it at home.   Arrive one hour early. Bring someone who can take you home after the test. Your medicine will make you sleepy. After the exam, you may not drive, take a bus or take a taxi by yourself.   No eating 8 hours before your exam. You may have clear liquids up until 4 hours before your exam. (Clear liquids include water, clear tea, black coffee and fruit juice without pulp.)  IF YOU WILL RECEIVE ANESTHESIA (be asleep for your exam):   Arrive 1 1/2 hours early. Bring someone who can take you home after the test. You may not drive, take a bus or take a taxi by yourself.   No eating 8 hours before your exam. You may have clear liquids up until 4 hours before your exam. (Clear liquids include water, clear tea, black coffee and fruit juice without pulp.)   "Please call the Imaging Department at your exam site with any questions.            May 04, 2018 11:00 AM CDT   (Arrive by 10:45 AM)   Return Visit with Lauro Shaw MD   [facility] ([facility])    [address]  [address]  [facility] [address] [phone]              Who to contact     Please call your clinic at [phone] to:    Ask questions about your health    Make or cancel appointments    Discuss your medicines    Learn about your test results    Speak to your doctor   If you have compliments or concerns about an experience at your clinic, or if you wish to file a complaint, please contact [facility] Patient Relations at [phone] or email us at [email or web address]         Additional Information About Your Visit        MyChart Information     "[employer]t gives you secure access to your electronic health record. If you see a primary care provider, you can also send messages to your care team and make appointments. If you have questions, please call your primary care clinic.  If you do not have a primary care provider, please call [phone] and they will assist you.      [employer] is an electronic gateway that provides easy, online access to your medical records. With [employer], you can request a clinic appointment, read your test results, renew a prescription or communicate with your care team.     To access your existing account, please contact your [facility] or call [phone] for assistance.        Care EveryWhere ID     This is your Care EveryWhere ID. This could be used by other organizations to access your [facility] medical records  [id number]        Your Vitals Were     Pulse Temperature Height Pulse Oximetry BMI (Body Mass Index)       71 97.2  F (36.2  C) (Oral) 5' 10\" 97% 31.6 kg/m2        Blood Pressure from Last 3 Encounters:   01/03/18 138/81   12/29/17 (!) 148/91 "   12/26/17 125/75    Weight from Last 3 Encounters:   01/03/18 220 lb 3.2 oz   12/29/17 218 lb 8 oz   12/26/17 215 lb 9.6 oz              We Performed the Following     ANOSCOPY W/WO BRUSH/WASH          Today's Medication Changes          These changes are accurate as of: 1/3/18  2:29 PM.  If you have any questions, ask your nurse or doctor.               These medicines have changed or have updated prescriptions.        Dose/Directions    Calcium Carb-Cholecalciferol 500-400 MG-UNIT Tabs   Commonly known as:  calcium 500 +D   This may have changed:  when to take this   Used for:  Malnutrition (H)        Dose:  500 mg   Take 500 mg by mouth daily   Quantity:  90 tablet   Refills:  1       cyanocobalamin 1000 MCG tablet   Commonly known as:  vitamin  B-12   This may have changed:  when to take this   Used for:  Alcoholic cirrhosis of liver with ascites (H)        Dose:  1000 mcg   1 tablet (1,000 mcg) by Per G Tube route daily   Quantity:  130 tablet   Refills:  2       dulaglutide 1.5 MG/0.5ML pen   Commonly known as:  TRULICITY   This may have changed:  additional instructions   Used for:  DM type 2, goal HbA1c 7%-8% (H)        Dose:  1.5 mg   Inject 1.5 mg Subcutaneous every 7 days   Quantity:  6 mL   Refills:  1       glipiZIDE 10 MG 24 hr tablet   Commonly known as:  GLUCOTROL XL   This may have changed:  when to take this   Used for:  DM type 2, goal HbA1c 7%-8% (H)        Dose:  10 mg   Take 1 tablet (10 mg) by mouth daily   Quantity:  90 tablet   Refills:  0       levothyroxine 88 MCG tablet   Commonly known as:  SYNTHROID/LEVOTHROID   This may have changed:  when to take this   Used for:  Hypothyroidism        Dose:  88 mcg   Take 1 tablet (88 mcg) by mouth daily   Quantity:  90 tablet   Refills:  3       pravastatin 80 MG tablet   Commonly known as:  PRAVACHOL   This may have changed:  See the new instructions.   Used for:  High cholesterol        TAKE ONE TABLET BY MOUTH ONCE DAILY   Quantity:  90 tablet    Refills:  1       thiamine 100 MG tablet   This may have changed:  when to take this   Used for:  Alcoholic cirrhosis of liver with ascites (H)        Dose:  100 mg   Take 1 tablet (100 mg) by mouth daily   Quantity:  100 tablet   Refills:  1                Primary Care Provider Office Phone # Fax #    King Louis [phone]       [address] [address]        Equal Access to Services     SAMUEL FAIR : Hadii aad ku hadasho Soomaali, waaxda luqadaha, qaybta kaalmada adeegyada, waxay dinain haydonavonn adeeg michelledenise lajoyce . So [facility] [phone].    ATENCIÓN: Si habla español, tiene a chiang disposición servicios gratuitos de asistencia lingüística. Ryanbacilio al [phone].    We comply with applicable federal civil rights laws and [place] laws. We do not discriminate on the basis of race, color, national origin, age, disability, sex, sexual orientation, or gender identity.            Thank you!     Thank you for choosing [facility] COLON AND RECTAL SURGERY  for your care. Our goal is always to provide you with excellent care. Hearing back from our patients is one way we can continue to improve our services. Please take a few minutes to complete the written survey that you may receive in the mail after your visit with us. Thank you!             Your Updated Medication List - Protect others around you: Learn how to safely use, store and throw away your medicines at www.disposemymeds.org.          This list is accurate as of: 1/3/18  2:29 PM.  Always use your most recent med list.                   Brand Name Dispense Instructions for use Diagnosis    blood glucose monitoring test strip    ONETOUCH ULTRA    300 each    Use to test blood sugars 4 times daily as needed or as directed.    Diabetes mellitus, type 2 (H)       Calcium Carb-Cholecalciferol 500-400 MG-UNIT Tabs    calcium 500 +D    90 tablet    Take 500 mg by mouth daily    Malnutrition (H)       CANE, ANY MATERIAL     1  each    One cane    Balance disorder       cyanocobalamin 1000 MCG tablet    vitamin  B-12    130 tablet    1 tablet (1,000 mcg) by Per G Tube route daily    Alcoholic cirrhosis of liver with ascites (H)       dulaglutide 1.5 MG/0.5ML pen    TRULICITY    6 mL    Inject 1.5 mg Subcutaneous every 7 days    DM type 2, goal HbA1c 7%-8% (H)       ferrous sulfate 325 (65 FE) MG tablet    IRON    200 tablet    Take 1 tablet (325 mg) by mouth 2 times daily    Other iron deficiency anemia       gabapentin 100 MG capsule    NEURONTIN    30 capsule    Take 1 capsule (100 mg) by mouth At Bedtime    Mild episode of recurrent major depressive disorder (H)       glipiZIDE 10 MG 24 hr tablet    GLUCOTROL XL    90 tablet    Take 1 tablet (10 mg) by mouth daily    DM type 2, goal HbA1c 7%-8% (H)       HYDROcodone-acetaminophen 5-325 MG per tablet    NORCO    60 tablet    Take 2 tablets by mouth every 6 hours as needed for moderate to severe pain    Brain tumor (H)       hydrOXYzine 25 MG tablet    ATARAX    180 tablet    Take 1 tablet (25 mg) by mouth 2 times daily    Itching, Anxiety       lactulose 10 GM/15ML solution    CHRONULAC    473 mL    Take 7.5 mLs (5 g) by mouth 2 times daily    Hepatic encephalopathy (H)       levothyroxine 88 MCG tablet    SYNTHROID/LEVOTHROID    90 tablet    Take 1 tablet (88 mcg) by mouth daily    Hypothyroidism       lidocaine 4 % Crea cream    LMX4    133 g    Apply topically once as needed for mild pain    Port catheter in place       methylphenidate 10 MG tablet    RITALIN    60 tablet    10mg twice a day    Major depressive disorder with single episode, in partial remission (H)       mirtazapine 45 MG tablet    REMERON    30 tablet    Take 1 tablet (45 mg) by mouth At Bedtime    Major depressive disorder with single episode, in partial remission (H)       multivitamin, therapeutic with minerals Tabs tablet      Take 1 tablet by mouth 2 times daily        omeprazole 20 MG CR capsule    priLOSEC     360 capsule    Take 2 capsules (40 mg) by mouth 2 times daily    Gastroesophageal reflux disease without esophagitis       ONETOUCH LANCETS Misc     100 each    by In Vitro route 4 times daily as needed    Type II or unspecified type diabetes mellitus without mention of complication, not stated as uncontrolled       phenytoin 30 MG CR capsule    DILANTIN    720 capsule    Take 4 capsules (120 mg) by mouth 2 times daily    Oligoastrocytoma of parietal lobe (H)       pravastatin 80 MG tablet    PRAVACHOL    90 tablet    TAKE ONE TABLET BY MOUTH ONCE DAILY    High cholesterol       rifaximin 550 MG Tabs tablet    XIFAXAN    60 tablet    Take 1 tablet (550 mg) by mouth 2 times daily    Hepatic encephalopathy (H)       sulfamethoxazole-trimethoprim 800-160 MG per tablet    BACTRIM DS/SEPTRA DS    60 tablet    Take 1 tablet by mouth 2 times daily    Spontaneous bacterial peritonitis (H)       thiamine 100 MG tablet     100 tablet    Take 1 tablet (100 mg) by mouth daily    Alcoholic cirrhosis of liver with ascites (H)       traZODone 50 MG tablet    DESYREL    30 tablet    Take 1 tablet (50 mg) by mouth nightly as needed for sleep    Primary insomnia

## 2018-01-03 NOTE — LETTER
1/3/2018      RE: Mono Varghese  [address]  [facility] [address]       Colon and Rectal Surgery Consult Clinic Note    Date: 1/3/2018     Referring provider:  King Louis MD  [address] [facility] [address]  [place] [address]     RE: Mono Varghese  : 1970  KIANA: 1/3/2018    Mono Varghese is a very pleasant 47 year old male with a history of brain tumor, diabetes mellitus, liver cirrhosis with MELD of 12, and history of esophageal varices with a recent diagnosis of rectal bleeding.  Given these findings they were subsequently sent to the [facility] for an opinion on this and a new patient consultation.     Mono presents today with his wife who is also his caregiver.  He is on lactulose for his liver failure and has approximately 7-10 bowel movements a day.  He gets perianal irritation with this and his wife has noticed bright red blood mixed with his stools over the past 3-4 months.  She states that it is often more blood in the toilet and stool.  He has used Preparation H which helps with the irritation but has not improved bleeding.  His hemoglobin has remained stable at 12.8.  He is scheduled for an EGD on  with Dr. Kelly.  He denies any abdominal pain, nausea, or vomiting.  He last underwent a colonoscopy in 2015 will he was in the ICU with sepsis and colonic distention.    Assessment/Plan: On exam Mono does have a very large internal hemorrhoids.  I am concerned for possible rectal varices being another source of bleeding.  Recommend that he get a flexible sigmoidoscopy at the time of his EGD and I discussed this with Dr. Tanner who is in agreement.  If this is normal and bleeding is due to hemorrhoids, I would recommend that he return to clinic to meet with one of our surgeons to discuss if there are any safe interventions for his rectal bleeding.  He needs to continue on his lactulose which causes frequent bowel movements so I  recommended that he try using a zinc barrier cream on his perianal skin for the irritation.  Patient's questions were answered to his stated satisfaction and he is in agreement with this plan.    Medical history:  Past Medical History:   Diagnosis Date     Brain tumor (H)      Liver failure (H)        Surgical history:  Past Surgical History:   Procedure Laterality Date     COLONOSCOPY N/A 11/27/2015    Procedure: COMBINED COLONOSCOPY, SINGLE OR MULTIPLE BIOPSY/POLYPECTOMY BY BIOPSY;  Surgeon: Ran Thurston MD;  Location:  GI     ESOPHAGOSCOPY, GASTROSCOPY, DUODENOSCOPY (EGD), COMBINED N/A 11/23/2015    Procedure: COMBINED ESOPHAGOSCOPY, GASTROSCOPY, DUODENOSCOPY (EGD);  Surgeon: Rocael Rizvi MD;  Location:  OR     ESOPHAGOSCOPY, GASTROSCOPY, DUODENOSCOPY (EGD), COMBINED N/A 1/25/2017    Procedure: COMBINED ESOPHAGOSCOPY, GASTROSCOPY, DUODENOSCOPY (EGD), BIOPSY SINGLE OR MULTIPLE;  Surgeon: Rocael Tejada MD;  Location:  GI     ESOPHAGOSCOPY, GASTROSCOPY, DUODENOSCOPY (EGD), COMBINED N/A 3/30/2017    Procedure: COMBINED ESOPHAGOSCOPY, GASTROSCOPY, DUODENOSCOPY (EGD);  Surgeon: Jordana Ramirez MD;  Location:  GI     OPTICAL TRACKING SYSTEM CRANIOTOMY, EXCISE TUMOR, COMBINED  6/6/2013    Procedure: COMBINED OPTICAL TRACKING SYSTEM CRANIOTOMY, EXCISE TUMOR;  Left Stealth Guided Craniotomy , Tumor Resection ;  Surgeon: J Carlos Aranda MD;  Location:  OR     ORTHOPEDIC SURGERY      hand surgery- right     SIGMOIDOSCOPY FLEXIBLE N/A 11/24/2015    Procedure: SIGMOIDOSCOPY FLEXIBLE;  Surgeon: Rocael Rizvi MD;  Location:  GI       Problem list:  Patient Active Problem List    Diagnosis Date Noted     Chronic pain 08/01/2016     Priority: High     Encephalopathy, hepatic (H) 07/29/2017     Priority: Medium     Hx of psychiatric care 10/05/2016     Priority: Medium     Past Psychotropic Medications:    Drug   Dose    (mg)   Full Trial    yes,no,unk   Helpful    yes,no,maybe    Adverse Effects    no, yes-list   Reason for DC     Duloxetine   90 mg   yes   yes   None reported   Currently taking     Bupropion   unknown   Yes   unknown   None reported   Lower seizure threshold     Venlafaxine   75 mg   yes   yes   None reported   Currently taking     Quetiapine   25 mg   no   unknown   thrombocytopenia (?)   myelosuppression     Valproic Acid   1,000 mg   unknown   unknown   None reported   Resolution of seizures                    Hyperlipidemia LDL goal <100 08/01/2016     Priority: Medium     Pancytopenia (H) 08/01/2016     Priority: Medium     Chronic pancytopenia secondary to liver disease since at least 2012       Malnutrition (H) 08/01/2016     Priority: Medium     Hospitalized in 12/2015 for variceal bleeding, subsequently diagnosed with SBP.  His hospital course was complicated by altered mental status and malnutrition, underwent placement of a PEG tube       Advance care planning 03/29/2016     Priority: Medium     Advance Care Planning 3/29/2016: Receipt of ACP document:  Received: POLST which was signed and dated by provider on 2/18/16.  Document previously scanned on 2/25/16.  Order reviewed and found to be valid.  Code Status needs to be updated to reflect choices in most recent ACP document. Notification sent to Dr. Louis for followup.  Also received valid POLST dated 7/16/13 and scanned on 7/29/13.  Confirmed/documented designated decision maker(s).  Added by Rayna Colorado , Advance Care Planning Liaison               Portal vein thrombosis 12/18/2015     Priority: Medium     history of left lower extremity deep vein thrombosis as well as portal vein and superior mesenteric vein thrombosis, late 2015 when he was hospitalized in the ICU and seriously ill  temporary IVC filter placed in 12/2015 when he was in the ICU with deep vein thromboses and active bleeding       GIB (gastrointestinal bleeding) 11/23/2015     Priority: Medium     Admitted to the ICU 11/2015 for  hemorrhagic shock 2/2 variceal bleed with subsequent sequelae of shock liver, multi organ dysfunction including altered mental status of unknown etiology, multiple thrombosis, decompensated liver cirrhosis, hematologic abnormalities, and JOSEF  s/p banding at the end of 03/2016       Ringing in ears, unspecified laterality 08/26/2015     Priority: Medium     Myopic astigmatism of both eyes 07/06/2015     Priority: Medium     Presbyopia 07/06/2015     Priority: Medium     Cirrhosis of liver (H) 06/17/2015     Priority: Medium     cirrhosis secondary to alcohol, complicated by variceal bleeding and hepatic encephalopathy       Pulmonary nodules 06/17/2015     Priority: Medium     Partial epilepsy with impairment of consciousness (H) 05/07/2014     Priority: Medium     Class: Chronic     Thrombocytopenia (H) 07/25/2013     Priority: Medium     ADHD (attention deficit hyperactivity disorder) 07/05/2013     Priority: Medium     Financial difficulties 07/05/2013     Priority: Medium     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 status post subtotal resection by Dr. J Carlos Aranda in early 06/2013. Negative for 1p/19q deletions. S/P Concurrent chemoradiation July 15 through August 23, 2013. Initiated adjuvant oral temozolomide 9/17/13; switched to IV temozolomide due to insurance coverage. Completed adjuvant temozolomide chemotherapy Feb 2014.       LENA (obstructive sleep apnea)-moderate (AHI 16) 12/18/2012     Priority: Medium     Polysomnogram 12/14/2012: 208.3 lbs.  Lowest oxygen saturation was 86.0%. Apnea/Hypopnea Index was 16.4 events per hour.  REM AHI is 20.7, supine AHI is 16.4.  RDI was 22       Type 2 diabetes mellitus (H) 10/03/2012     Priority: Medium     Moderate major depression (H) 10/03/2012      Priority: Medium     Hypothyroidism 08/01/2016     Priority: Low       Medications:  Current Outpatient Prescriptions   Medication Sig Dispense Refill     mirtazapine (REMERON) 45 MG tablet Take 1 tablet (45 mg) by mouth At Bedtime 30 tablet 0     methylphenidate (RITALIN) 10 MG tablet 10mg twice a day 60 tablet 0     lidocaine (LMX4) 4 % CREA cream Apply topically once as needed for mild pain 133 g 1     CANE, ANY MATERIAL One cane 1 each 0     HYDROcodone-acetaminophen (NORCO) 5-325 MG per tablet Take 2 tablets by mouth every 6 hours as needed for moderate to severe pain 60 tablet 0     phenytoin (DILANTIN) 30 MG CR capsule Take 4 capsules (120 mg) by mouth 2 times daily 720 capsule 1     glipiZIDE (GLUCOTROL XL) 10 MG 24 hr tablet Take 1 tablet (10 mg) by mouth daily (Patient taking differently: Take 10 mg by mouth every morning ) 90 tablet 0     omeprazole (PRILOSEC) 20 MG CR capsule Take 2 capsules (40 mg) by mouth 2 times daily 360 capsule 3     dulaglutide (TRULICITY) 1.5 MG/0.5ML pen Inject 1.5 mg Subcutaneous every 7 days (Patient taking differently: Inject 1.5 mg Subcutaneous every 7 days Tuesday) 6 mL 1     hydrOXYzine (ATARAX) 25 MG tablet Take 1 tablet (25 mg) by mouth 2 times daily 180 tablet 1     cyanocobalamin (VITAMIN  B-12) 1000 MCG tablet 1 tablet (1,000 mcg) by Per G Tube route daily (Patient taking differently: 1,000 mcg by Per G Tube route every morning ) 130 tablet 2     sulfamethoxazole-trimethoprim (BACTRIM DS/SEPTRA DS) 800-160 MG per tablet Take 1 tablet by mouth 2 times daily 60 tablet 5     gabapentin (NEURONTIN) 100 MG capsule Take 1 capsule (100 mg) by mouth At Bedtime 30 capsule 3     traZODone (DESYREL) 50 MG tablet Take 1 tablet (50 mg) by mouth nightly as needed for sleep 30 tablet 3     pravastatin (PRAVACHOL) 80 MG tablet TAKE ONE TABLET BY MOUTH ONCE DAILY (Patient taking differently: TAKE ONE TABLET BY MOUTH ONCE DAILY AT BEDTIME) 90 tablet 1     lactulose (CHRONULAC) 10  "GM/15ML solution Take 7.5 mLs (5 g) by mouth 2 times daily 473 mL 1     thiamine 100 MG tablet Take 1 tablet (100 mg) by mouth daily (Patient taking differently: Take 100 mg by mouth every morning ) 100 tablet 1     rifaximin (XIFAXAN) 550 MG TABS tablet Take 1 tablet (550 mg) by mouth 2 times daily 60 tablet 11     blood glucose monitoring (ONE TOUCH ULTRA) test strip Use to test blood sugars 4 times daily as needed or as directed. 300 each 4     levothyroxine (SYNTHROID/LEVOTHROID) 88 MCG tablet Take 1 tablet (88 mcg) by mouth daily (Patient taking differently: Take 88 mcg by mouth every morning ) 90 tablet 3     ferrous sulfate (IRON) 325 (65 FE) MG tablet Take 1 tablet (325 mg) by mouth 2 times daily 200 tablet 3     Calcium Carb-Cholecalciferol (CALCIUM 500 +D) 500-400 MG-UNIT TABS Take 500 mg by mouth daily (Patient taking differently: Take 500 mg by mouth every morning ) 90 tablet 1     multivitamin, therapeutic with minerals (THERA-VIT-M) TABS Take 1 tablet by mouth 2 times daily       ONE TOUCH LANCETS MISC by In Vitro route 4 times daily as needed 100 each prn       Allergies:  Allergies   Allergen Reactions     No Clinical Screening - See Comments      Coban and Surgilast cause itching     Tegaderm Transparent Dressing (Informational Only)      Latex Itching and Rash       Family history:  Family History   Problem Relation Age of Onset     Unknown/Adopted Mother        Social history:  Social History   Substance Use Topics     Smoking status: Never Smoker     Smokeless tobacco: Never Used     Alcohol use No      Comment: Quit 01/2014    Marital status: .    Nursing Notes:   Idania Cummins LPN  1/3/2018  1:35 PM  Signed  Chief Complaint   Patient presents with     Clinic Care Coordination - Initial     new       Vitals:    01/03/18 1333   BP: 138/81   Pulse: 71   Temp: 97.2  F (36.2  C)   TempSrc: Oral   SpO2: 97%   Weight: 220 lb 3.2 oz   Height: 5' 10\"       Body mass index is 31.6 " "kg/(m^2).      Idania FLORES LPN                          Physical Examination:  /81  Pulse 71  Temp 97.2  F (36.2  C) (Oral)  Ht 5' 10\"  Wt 220 lb 3.2 oz  SpO2 97%  BMI 31.6 kg/m2  General: Alert, oriented, in no acute distress, sitting comfortably  HEENT: Mucous membranes moist  Perianal external examination:  Perianal skin: Intact with no excoriation or lichenification.  Lesions: No evidence of an external lesion, nodularity, or induration in the perianal region.  Eversion of buttocks: There was not evidence of an anal fissure. Details: N/A.  Skin tags or external hemorrhoids: None.  Digital rectal examination: Was performed.   Sphincter tone: Good.  Palpable lesions: No.  Prostate: Normal.  Other: None.      Anoscopy: Was performed.   Hemorrhoids: Yes.  Grade 3 internal hemorrhoids circumferentially without any active bleeding  Lesions: No    Total face to face time was 30 minutes, >50% counseling.    TIESHA Strong, NP-C  Colon and Rectal Surgery   [facility]    This note was created using speech recognition software and may contain unintended word substitutions.    TIESHA Strong CNP      "

## 2018-01-03 NOTE — PROGRESS NOTES
Colon and Rectal Surgery Consult Clinic Note    Date: 1/3/2018     Referring provider:  King Louis MD  [address] [facility] [address]  [place] [address]     RE: Mono Varghese  : 1970  KIANA: 1/3/2018    Mono Varghese is a very pleasant 47 year old male with a history of brain tumor, diabetes mellitus, liver cirrhosis with MELD of 12, and history of esophageal varices with a recent diagnosis of rectal bleeding.  Given these findings they were subsequently sent to the [facility] for an opinion on this and a new patient consultation.     Mono presents today with his wife who is also his caregiver.  He is on lactulose for his liver failure and has approximately 7-10 bowel movements a day.  He gets perianal irritation with this and his wife has noticed bright red blood mixed with his stools over the past 3-4 months.  She states that it is often more blood in the toilet and stool.  He has used Preparation H which helps with the irritation but has not improved bleeding.  His hemoglobin has remained stable at 12.8.  He is scheduled for an EGD on  with Dr. Kelly.  He denies any abdominal pain, nausea, or vomiting.  He last underwent a colonoscopy in 2015 will he was in the ICU with sepsis and colonic distention.    Assessment/Plan: On exam Mono does have a very large internal hemorrhoids.  I am concerned for possible rectal varices being another source of bleeding.  Recommend that he get a flexible sigmoidoscopy at the time of his EGD and I discussed this with Dr. Tanner who is in agreement.  If this is normal and bleeding is due to hemorrhoids, I would recommend that he return to clinic to meet with one of our surgeons to discuss if there are any safe interventions for his rectal bleeding.  He needs to continue on his lactulose which causes frequent bowel movements so I recommended that he try using a zinc barrier cream on his perianal skin for the  irritation.  Patient's questions were answered to his stated satisfaction and he is in agreement with this plan.    Medical history:  Past Medical History:   Diagnosis Date     Brain tumor (H)      Liver failure (H)        Surgical history:  Past Surgical History:   Procedure Laterality Date     COLONOSCOPY N/A 11/27/2015    Procedure: COMBINED COLONOSCOPY, SINGLE OR MULTIPLE BIOPSY/POLYPECTOMY BY BIOPSY;  Surgeon: Ran Thurston MD;  Location:  GI     ESOPHAGOSCOPY, GASTROSCOPY, DUODENOSCOPY (EGD), COMBINED N/A 11/23/2015    Procedure: COMBINED ESOPHAGOSCOPY, GASTROSCOPY, DUODENOSCOPY (EGD);  Surgeon: Rocael Rizvi MD;  Location:  OR     ESOPHAGOSCOPY, GASTROSCOPY, DUODENOSCOPY (EGD), COMBINED N/A 1/25/2017    Procedure: COMBINED ESOPHAGOSCOPY, GASTROSCOPY, DUODENOSCOPY (EGD), BIOPSY SINGLE OR MULTIPLE;  Surgeon: Rocael Tejada MD;  Location:  GI     ESOPHAGOSCOPY, GASTROSCOPY, DUODENOSCOPY (EGD), COMBINED N/A 3/30/2017    Procedure: COMBINED ESOPHAGOSCOPY, GASTROSCOPY, DUODENOSCOPY (EGD);  Surgeon: Jordana Ramirez MD;  Location:  GI     OPTICAL TRACKING SYSTEM CRANIOTOMY, EXCISE TUMOR, COMBINED  6/6/2013    Procedure: COMBINED OPTICAL TRACKING SYSTEM CRANIOTOMY, EXCISE TUMOR;  Left Stealth Guided Craniotomy , Tumor Resection ;  Surgeon: J Carlos Aranda MD;  Location:  OR     ORTHOPEDIC SURGERY      hand surgery- right     SIGMOIDOSCOPY FLEXIBLE N/A 11/24/2015    Procedure: SIGMOIDOSCOPY FLEXIBLE;  Surgeon: Rocael Rizvi MD;  Location:  GI       Problem list:  Patient Active Problem List    Diagnosis Date Noted     Chronic pain 08/01/2016     Priority: High     Encephalopathy, hepatic (H) 07/29/2017     Priority: Medium     Hx of psychiatric care 10/05/2016     Priority: Medium     Past Psychotropic Medications:    Drug   Dose    (mg)   Full Trial    yes,no,unk   Helpful    yes,no,maybe   Adverse Effects    no, yes-list   Reason for DC     Duloxetine   90 mg   yes    yes   None reported   Currently taking     Bupropion   unknown   Yes   unknown   None reported   Lower seizure threshold     Venlafaxine   75 mg   yes   yes   None reported   Currently taking     Quetiapine   25 mg   no   unknown   thrombocytopenia (?)   myelosuppression     Valproic Acid   1,000 mg   unknown   unknown   None reported   Resolution of seizures                    Hyperlipidemia LDL goal <100 08/01/2016     Priority: Medium     Pancytopenia (H) 08/01/2016     Priority: Medium     Chronic pancytopenia secondary to liver disease since at least 2012       Malnutrition (H) 08/01/2016     Priority: Medium     Hospitalized in 12/2015 for variceal bleeding, subsequently diagnosed with SBP.  His hospital course was complicated by altered mental status and malnutrition, underwent placement of a PEG tube       Advance care planning 03/29/2016     Priority: Medium     Advance Care Planning 3/29/2016: Receipt of ACP document:  Received: POLST which was signed and dated by provider on 2/18/16.  Document previously scanned on 2/25/16.  Order reviewed and found to be valid.  Code Status needs to be updated to reflect choices in most recent ACP document. Notification sent to Dr. Louis for followup.  Also received valid POLST dated 7/16/13 and scanned on 7/29/13.  Confirmed/documented designated decision maker(s).  Added by Rayna Colorado , Advance Care Planning Liaison               Portal vein thrombosis 12/18/2015     Priority: Medium     history of left lower extremity deep vein thrombosis as well as portal vein and superior mesenteric vein thrombosis, late 2015 when he was hospitalized in the ICU and seriously ill  temporary IVC filter placed in 12/2015 when he was in the ICU with deep vein thromboses and active bleeding       GIB (gastrointestinal bleeding) 11/23/2015     Priority: Medium     Admitted to the ICU 11/2015 for hemorrhagic shock 2/2 variceal bleed with subsequent sequelae of shock liver,  multi organ dysfunction including altered mental status of unknown etiology, multiple thrombosis, decompensated liver cirrhosis, hematologic abnormalities, and JOSEF  s/p banding at the end of 03/2016       Ringing in ears, unspecified laterality 08/26/2015     Priority: Medium     Myopic astigmatism of both eyes 07/06/2015     Priority: Medium     Presbyopia 07/06/2015     Priority: Medium     Cirrhosis of liver (H) 06/17/2015     Priority: Medium     cirrhosis secondary to alcohol, complicated by variceal bleeding and hepatic encephalopathy       Pulmonary nodules 06/17/2015     Priority: Medium     Partial epilepsy with impairment of consciousness (H) 05/07/2014     Priority: Medium     Class: Chronic     Thrombocytopenia (H) 07/25/2013     Priority: Medium     ADHD (attention deficit hyperactivity disorder) 07/05/2013     Priority: Medium     Financial difficulties 07/05/2013     Priority: Medium     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 status post subtotal resection by Dr. J Carlos Aranda in early 06/2013. Negative for 1p/19q deletions. S/P Concurrent chemoradiation July 15 through August 23, 2013. Initiated adjuvant oral temozolomide 9/17/13; switched to IV temozolomide due to insurance coverage. Completed adjuvant temozolomide chemotherapy Feb 2014.       LENA (obstructive sleep apnea)-moderate (AHI 16) 12/18/2012     Priority: Medium     Polysomnogram 12/14/2012: 208.3 lbs.  Lowest oxygen saturation was 86.0%. Apnea/Hypopnea Index was 16.4 events per hour.  REM AHI is 20.7, supine AHI is 16.4.  RDI was 22       Type 2 diabetes mellitus (H) 10/03/2012     Priority: Medium     Moderate major depression (H) 10/03/2012     Priority: Medium     Hypothyroidism 08/01/2016     Priority: Low        Medications:  Current Outpatient Prescriptions   Medication Sig Dispense Refill     mirtazapine (REMERON) 45 MG tablet Take 1 tablet (45 mg) by mouth At Bedtime 30 tablet 0     methylphenidate (RITALIN) 10 MG tablet 10mg twice a day 60 tablet 0     lidocaine (LMX4) 4 % CREA cream Apply topically once as needed for mild pain 133 g 1     CANE, ANY MATERIAL One cane 1 each 0     HYDROcodone-acetaminophen (NORCO) 5-325 MG per tablet Take 2 tablets by mouth every 6 hours as needed for moderate to severe pain 60 tablet 0     phenytoin (DILANTIN) 30 MG CR capsule Take 4 capsules (120 mg) by mouth 2 times daily 720 capsule 1     glipiZIDE (GLUCOTROL XL) 10 MG 24 hr tablet Take 1 tablet (10 mg) by mouth daily (Patient taking differently: Take 10 mg by mouth every morning ) 90 tablet 0     omeprazole (PRILOSEC) 20 MG CR capsule Take 2 capsules (40 mg) by mouth 2 times daily 360 capsule 3     dulaglutide (TRULICITY) 1.5 MG/0.5ML pen Inject 1.5 mg Subcutaneous every 7 days (Patient taking differently: Inject 1.5 mg Subcutaneous every 7 days Tuesday) 6 mL 1     hydrOXYzine (ATARAX) 25 MG tablet Take 1 tablet (25 mg) by mouth 2 times daily 180 tablet 1     cyanocobalamin (VITAMIN  B-12) 1000 MCG tablet 1 tablet (1,000 mcg) by Per G Tube route daily (Patient taking differently: 1,000 mcg by Per G Tube route every morning ) 130 tablet 2     sulfamethoxazole-trimethoprim (BACTRIM DS/SEPTRA DS) 800-160 MG per tablet Take 1 tablet by mouth 2 times daily 60 tablet 5     gabapentin (NEURONTIN) 100 MG capsule Take 1 capsule (100 mg) by mouth At Bedtime 30 capsule 3     traZODone (DESYREL) 50 MG tablet Take 1 tablet (50 mg) by mouth nightly as needed for sleep 30 tablet 3     pravastatin (PRAVACHOL) 80 MG tablet TAKE ONE TABLET BY MOUTH ONCE DAILY (Patient taking differently: TAKE ONE TABLET BY MOUTH ONCE DAILY AT BEDTIME) 90 tablet 1     lactulose (CHRONULAC) 10 GM/15ML solution Take 7.5 mLs (5 g) by mouth 2 times daily 473 mL 1  "    thiamine 100 MG tablet Take 1 tablet (100 mg) by mouth daily (Patient taking differently: Take 100 mg by mouth every morning ) 100 tablet 1     rifaximin (XIFAXAN) 550 MG TABS tablet Take 1 tablet (550 mg) by mouth 2 times daily 60 tablet 11     blood glucose monitoring (ONE TOUCH ULTRA) test strip Use to test blood sugars 4 times daily as needed or as directed. 300 each 4     levothyroxine (SYNTHROID/LEVOTHROID) 88 MCG tablet Take 1 tablet (88 mcg) by mouth daily (Patient taking differently: Take 88 mcg by mouth every morning ) 90 tablet 3     ferrous sulfate (IRON) 325 (65 FE) MG tablet Take 1 tablet (325 mg) by mouth 2 times daily 200 tablet 3     Calcium Carb-Cholecalciferol (CALCIUM 500 +D) 500-400 MG-UNIT TABS Take 500 mg by mouth daily (Patient taking differently: Take 500 mg by mouth every morning ) 90 tablet 1     multivitamin, therapeutic with minerals (THERA-VIT-M) TABS Take 1 tablet by mouth 2 times daily       ONE TOUCH LANCETS MISC by In Vitro route 4 times daily as needed 100 each prn       Allergies:  Allergies   Allergen Reactions     No Clinical Screening - See Comments      Coban and Surgilast cause itching     Tegaderm Transparent Dressing (Informational Only)      Latex Itching and Rash       Family history:  Family History   Problem Relation Age of Onset     Unknown/Adopted Mother        Social history:  Social History   Substance Use Topics     Smoking status: Never Smoker     Smokeless tobacco: Never Used     Alcohol use No      Comment: Quit 01/2014    Marital status: .    Nursing Notes:   Idania Cummins LPN  1/3/2018  1:35 PM  Signed  Chief Complaint   Patient presents with     Clinic Care Coordination - Initial     new       Vitals:    01/03/18 1333   BP: 138/81   Pulse: 71   Temp: 97.2  F (36.2  C)   TempSrc: Oral   SpO2: 97%   Weight: 220 lb 3.2 oz   Height: 5' 10\"       Body mass index is 31.6 kg/(m^2).      Idania FLORES LPN                          Physical Examination:  BP " "138/81  Pulse 71  Temp 97.2  F (36.2  C) (Oral)  Ht 5' 10\"  Wt 220 lb 3.2 oz  SpO2 97%  BMI 31.6 kg/m2  General: Alert, oriented, in no acute distress, sitting comfortably  HEENT: Mucous membranes moist  Perianal external examination:  Perianal skin: Intact with no excoriation or lichenification.  Lesions: No evidence of an external lesion, nodularity, or induration in the perianal region.  Eversion of buttocks: There was not evidence of an anal fissure. Details: N/A.  Skin tags or external hemorrhoids: None.  Digital rectal examination: Was performed.   Sphincter tone: Good.  Palpable lesions: No.  Prostate: Normal.  Other: None.      Anoscopy: Was performed.   Hemorrhoids: Yes.  Grade 3 internal hemorrhoids circumferentially without any active bleeding  Lesions: No    Total face to face time was 30 minutes, >50% counseling.    TIESHA Strong, NP-C  Colon and Rectal Surgery   [facility]    This note was created using speech recognition software and may contain unintended word substitutions.  "

## 2018-01-04 ENCOUNTER — CARE COORDINATION (OUTPATIENT)
Dept: GASTROENTEROLOGY | Facility: CLINIC | Age: 48
End: 2018-01-04

## 2018-01-04 NOTE — PROGRESS NOTES
Message left for Mono that a [employer] message sent with prep for flex sig.     Shalini GERMAN RN Coordinator  Dr. Aragon, Dr. Dickens & Dr. Kelly   [facility]  [phone]

## 2018-01-05 NOTE — TELEPHONE ENCOUNTER
Rich Vega Michelle, RN        Phone Number: [phone]                     Pt's wife called and would like to have his prescription for Ritalin mailed to him at the address in his appointment desk, since she keeps forgetting to  the prescription here when she is in town.     Thanks!     OK to leave detailed voicemail message       Writer contacted pt's wife who states that she prefers to have script mailed to [facility].      Script mailed

## 2018-01-11 ENCOUNTER — MYC REFILL (OUTPATIENT)
Dept: GASTROENTEROLOGY | Facility: CLINIC | Age: 48
End: 2018-01-11

## 2018-01-11 DIAGNOSIS — K70.31 ALCOHOLIC CIRRHOSIS OF LIVER WITH ASCITES (H): ICD-10-CM

## 2018-01-11 DIAGNOSIS — E03.9 HYPOTHYROIDISM: ICD-10-CM

## 2018-01-11 DIAGNOSIS — E46 MALNUTRITION (H): ICD-10-CM

## 2018-01-11 RX ORDER — LEVOTHYROXINE SODIUM 88 UG/1
88 TABLET ORAL DAILY
Qty: 90 TABLET | Refills: 3 | Status: CANCELLED | OUTPATIENT
Start: 2018-01-11

## 2018-01-11 RX ORDER — LANOLIN ALCOHOL/MO/W.PET/CERES
100 CREAM (GRAM) TOPICAL DAILY
Qty: 100 TABLET | Refills: 1 | Status: SHIPPED | OUTPATIENT
Start: 2018-01-11

## 2018-01-11 RX ORDER — LANOLIN ALCOHOL/MO/W.PET/CERES
100 CREAM (GRAM) TOPICAL DAILY
Qty: 100 TABLET | Refills: 1 | Status: CANCELLED | OUTPATIENT
Start: 2018-01-11

## 2018-01-11 NOTE — TELEPHONE ENCOUNTER
thiamine 100 MG   Last Written Prescription Date:  6/15/17  Last Fill Quantity: 100,   # refills: 1  Last Office Visit : 12/20/17  Future Office visit:  1/23/18    Routing refill request to provider for review/approval because:  Drug not on the refill protocol

## 2018-01-11 NOTE — TELEPHONE ENCOUNTER
thiamine  Last Written Prescription Date:  6/15/17  Last Fill Quantity: 100  # refills: 1  Last Office Visit :12/20/17  Future Office visit:  1/23/18    Routing refill request to provider for review/approval because:   Non-Protocol

## 2018-01-19 ENCOUNTER — HOSPITAL ENCOUNTER (OUTPATIENT)
Facility: CLINIC | Age: 48
Discharge: HOME | End: 2018-01-19
Attending: INTERNAL MEDICINE | Admitting: INTERNAL MEDICINE
Payer: MEDICARE

## 2018-01-19 ENCOUNTER — SURGERY (OUTPATIENT)
Age: 48
End: 2018-01-19

## 2018-01-19 ENCOUNTER — ANESTHESIA (OUTPATIENT)
Dept: SURGERY | Facility: CLINIC | Age: 48
End: 2018-01-19
Payer: MEDICARE

## 2018-01-19 VITALS
HEART RATE: 77 BPM | TEMPERATURE: 98 F | HEIGHT: 70 IN | RESPIRATION RATE: 16 BRPM | DIASTOLIC BLOOD PRESSURE: 92 MMHG | SYSTOLIC BLOOD PRESSURE: 138 MMHG | WEIGHT: 214.73 LBS | OXYGEN SATURATION: 98 % | BODY MASS INDEX: 30.74 KG/M2

## 2018-01-19 DIAGNOSIS — K70.30 ALCOHOLIC CIRRHOSIS OF LIVER WITHOUT ASCITES (H): Primary | Chronic | ICD-10-CM

## 2018-01-19 LAB
ABO + RH BLD: NORMAL
ABO + RH BLD: NORMAL
BLD GP AB SCN SERPL QL: NORMAL
BLD PROD TYP BPU: NORMAL
BLD UNIT ID BPU: 0
BLOOD BANK CMNT PATIENT-IMP: NORMAL
BLOOD PRODUCT CODE: NORMAL
BPU ID: NORMAL
BUN SERPL-MCNC: 8 MG/DL (ref 7–30)
ERYTHROCYTE [DISTWIDTH] IN BLOOD BY AUTOMATED COUNT: 15.1 % (ref 10–15)
FLEXIBLE SIGMOIDOSCOPY: NORMAL
GLUCOSE BLDC GLUCOMTR-MCNC: 182 MG/DL (ref 70–99)
GLUCOSE BLDC GLUCOMTR-MCNC: 81 MG/DL (ref 70–99)
GLUCOSE BLDC GLUCOMTR-MCNC: 91 MG/DL (ref 70–99)
HCT VFR BLD AUTO: 38 % (ref 40–53)
HGB BLD-MCNC: 12.7 G/DL (ref 13.3–17.7)
INR PPP: 1.38 (ref 0.86–1.14)
MCH RBC QN AUTO: 32.8 PG (ref 26.5–33)
MCHC RBC AUTO-ENTMCNC: 33.4 G/DL (ref 31.5–36.5)
MCV RBC AUTO: 98 FL (ref 78–100)
NUM BPU REQUESTED: 1
PLATELET # BLD AUTO: 37 10E9/L (ref 150–450)
PLATELET # BLD AUTO: 41 10E9/L (ref 150–450)
PLATELET # BLD AUTO: 42 10E9/L (ref 150–450)
RBC # BLD AUTO: 3.87 10E12/L (ref 4.4–5.9)
SPECIMEN EXP DATE BLD: NORMAL
TRANSFUSION STATUS PATIENT QL: NORMAL
UPPER GI ENDOSCOPY: NORMAL
WBC # BLD AUTO: 1.7 10E9/L (ref 4–11)

## 2018-01-19 PROCEDURE — 36000051 ZZH SURGERY LEVEL 2 1ST 30 MIN - UMMC: Performed by: INTERNAL MEDICINE

## 2018-01-19 PROCEDURE — 86900 BLOOD TYPING SEROLOGIC ABO: CPT | Performed by: STUDENT IN AN ORGANIZED HEALTH CARE EDUCATION/TRAINING PROGRAM

## 2018-01-19 PROCEDURE — C9399 UNCLASSIFIED DRUGS OR BIOLOG: HCPCS | Performed by: STUDENT IN AN ORGANIZED HEALTH CARE EDUCATION/TRAINING PROGRAM

## 2018-01-19 PROCEDURE — 86901 BLOOD TYPING SEROLOGIC RH(D): CPT | Performed by: STUDENT IN AN ORGANIZED HEALTH CARE EDUCATION/TRAINING PROGRAM

## 2018-01-19 PROCEDURE — 85027 COMPLETE CBC AUTOMATED: CPT | Performed by: INTERNAL MEDICINE

## 2018-01-19 PROCEDURE — 36430 TRANSFUSION BLD/BLD COMPNT: CPT | Mod: 59

## 2018-01-19 PROCEDURE — 36000053 ZZH SURGERY LEVEL 2 EA 15 ADDTL MIN - UMMC: Performed by: INTERNAL MEDICINE

## 2018-01-19 PROCEDURE — 84520 ASSAY OF UREA NITROGEN: CPT | Performed by: INTERNAL MEDICINE

## 2018-01-19 PROCEDURE — A9270 NON-COVERED ITEM OR SERVICE: HCPCS | Performed by: INTERNAL MEDICINE

## 2018-01-19 PROCEDURE — 27210995 ZZH RX 272: Performed by: INTERNAL MEDICINE

## 2018-01-19 PROCEDURE — P9037 PLATE PHERES LEUKOREDU IRRAD: HCPCS | Performed by: INTERNAL MEDICINE

## 2018-01-19 PROCEDURE — 40000341 ZZHCL STATISTIC BB TRANSF RXN INVEST: Performed by: PATHOLOGY

## 2018-01-19 PROCEDURE — 71000027 ZZH RECOVERY PHASE 2 EACH 15 MINS: Performed by: INTERNAL MEDICINE

## 2018-01-19 PROCEDURE — 86850 RBC ANTIBODY SCREEN: CPT | Performed by: STUDENT IN AN ORGANIZED HEALTH CARE EDUCATION/TRAINING PROGRAM

## 2018-01-19 PROCEDURE — 27210794 ZZH OR GENERAL SUPPLY STERILE: Performed by: INTERNAL MEDICINE

## 2018-01-19 PROCEDURE — 82962 GLUCOSE BLOOD TEST: CPT

## 2018-01-19 PROCEDURE — 71000013 ZZH RECOVERY PHASE 1 LEVEL 1 EA ADDTL HR: Performed by: INTERNAL MEDICINE

## 2018-01-19 PROCEDURE — 85049 AUTOMATED PLATELET COUNT: CPT | Performed by: ANESTHESIOLOGY

## 2018-01-19 PROCEDURE — 25000128 H RX IP 250 OP 636: Performed by: STUDENT IN AN ORGANIZED HEALTH CARE EDUCATION/TRAINING PROGRAM

## 2018-01-19 PROCEDURE — 36415 COLL VENOUS BLD VENIPUNCTURE: CPT | Performed by: INTERNAL MEDICINE

## 2018-01-19 PROCEDURE — 71000012 ZZH RECOVERY PHASE 1 LEVEL 1 FIRST HR: Performed by: INTERNAL MEDICINE

## 2018-01-19 PROCEDURE — 85610 PROTHROMBIN TIME: CPT | Performed by: INTERNAL MEDICINE

## 2018-01-19 PROCEDURE — 40000170 ZZH STATISTIC PRE-PROCEDURE ASSESSMENT II: Performed by: INTERNAL MEDICINE

## 2018-01-19 PROCEDURE — 25000125 ZZHC RX 250: Performed by: INTERNAL MEDICINE

## 2018-01-19 PROCEDURE — 25000566 ZZH SEVOFLURANE, EA 15 MIN: Performed by: INTERNAL MEDICINE

## 2018-01-19 PROCEDURE — 37000008 ZZH ANESTHESIA TECHNICAL FEE, 1ST 30 MIN: Performed by: INTERNAL MEDICINE

## 2018-01-19 PROCEDURE — 37000009 ZZH ANESTHESIA TECHNICAL FEE, EACH ADDTL 15 MIN: Performed by: INTERNAL MEDICINE

## 2018-01-19 PROCEDURE — P9037 PLATE PHERES LEUKOREDU IRRAD: HCPCS | Performed by: ANESTHESIOLOGY

## 2018-01-19 PROCEDURE — 25000128 H RX IP 250 OP 636: Performed by: ANESTHESIOLOGY

## 2018-01-19 RX ORDER — NALOXONE HYDROCHLORIDE 0.4 MG/ML
.1-.4 INJECTION, SOLUTION INTRAMUSCULAR; INTRAVENOUS; SUBCUTANEOUS
Status: DISCONTINUED | OUTPATIENT
Start: 2018-01-19 | End: 2018-01-19 | Stop reason: HOSPADM

## 2018-01-19 RX ORDER — SODIUM CHLORIDE, SODIUM LACTATE, POTASSIUM CHLORIDE, CALCIUM CHLORIDE 600; 310; 30; 20 MG/100ML; MG/100ML; MG/100ML; MG/100ML
INJECTION, SOLUTION INTRAVENOUS CONTINUOUS
Status: DISCONTINUED | OUTPATIENT
Start: 2018-01-19 | End: 2018-01-19 | Stop reason: HOSPADM

## 2018-01-19 RX ORDER — ONDANSETRON 2 MG/ML
INJECTION INTRAMUSCULAR; INTRAVENOUS PRN
Status: DISCONTINUED | OUTPATIENT
Start: 2018-01-19 | End: 2018-01-19

## 2018-01-19 RX ORDER — ONDANSETRON 2 MG/ML
4 INJECTION INTRAMUSCULAR; INTRAVENOUS EVERY 30 MIN PRN
Status: DISCONTINUED | OUTPATIENT
Start: 2018-01-19 | End: 2018-01-19 | Stop reason: HOSPADM

## 2018-01-19 RX ORDER — FLUMAZENIL 0.1 MG/ML
0.2 INJECTION, SOLUTION INTRAVENOUS
Status: DISCONTINUED | OUTPATIENT
Start: 2018-01-19 | End: 2018-01-19 | Stop reason: HOSPADM

## 2018-01-19 RX ORDER — SODIUM CHLORIDE, SODIUM LACTATE, POTASSIUM CHLORIDE, CALCIUM CHLORIDE 600; 310; 30; 20 MG/100ML; MG/100ML; MG/100ML; MG/100ML
INJECTION, SOLUTION INTRAVENOUS CONTINUOUS PRN
Status: DISCONTINUED | OUTPATIENT
Start: 2018-01-19 | End: 2018-01-19

## 2018-01-19 RX ORDER — DEXAMETHASONE SODIUM PHOSPHATE 4 MG/ML
INJECTION, SOLUTION INTRA-ARTICULAR; INTRALESIONAL; INTRAMUSCULAR; INTRAVENOUS; SOFT TISSUE PRN
Status: DISCONTINUED | OUTPATIENT
Start: 2018-01-19 | End: 2018-01-19

## 2018-01-19 RX ORDER — LIDOCAINE 40 MG/G
CREAM TOPICAL
Status: DISCONTINUED | OUTPATIENT
Start: 2018-01-19 | End: 2018-01-19 | Stop reason: HOSPADM

## 2018-01-19 RX ORDER — FENTANYL CITRATE 50 UG/ML
25-50 INJECTION, SOLUTION INTRAMUSCULAR; INTRAVENOUS
Status: DISCONTINUED | OUTPATIENT
Start: 2018-01-19 | End: 2018-01-19 | Stop reason: HOSPADM

## 2018-01-19 RX ORDER — MEPERIDINE HYDROCHLORIDE 50 MG/ML
12.5 INJECTION INTRAMUSCULAR; INTRAVENOUS; SUBCUTANEOUS
Status: DISCONTINUED | OUTPATIENT
Start: 2018-01-19 | End: 2018-01-19 | Stop reason: HOSPADM

## 2018-01-19 RX ORDER — ACETAMINOPHEN 325 MG/1
975 TABLET ORAL ONCE
Status: DISCONTINUED | OUTPATIENT
Start: 2018-01-19 | End: 2018-01-19 | Stop reason: HOSPADM

## 2018-01-19 RX ORDER — ONDANSETRON 4 MG/1
4 TABLET, ORALLY DISINTEGRATING ORAL EVERY 30 MIN PRN
Status: DISCONTINUED | OUTPATIENT
Start: 2018-01-19 | End: 2018-01-19 | Stop reason: HOSPADM

## 2018-01-19 RX ORDER — LABETALOL HYDROCHLORIDE 5 MG/ML
10 INJECTION, SOLUTION INTRAVENOUS
Status: DISCONTINUED | OUTPATIENT
Start: 2018-01-19 | End: 2018-01-19 | Stop reason: HOSPADM

## 2018-01-19 RX ORDER — FENTANYL CITRATE 50 UG/ML
INJECTION, SOLUTION INTRAMUSCULAR; INTRAVENOUS PRN
Status: DISCONTINUED | OUTPATIENT
Start: 2018-01-19 | End: 2018-01-19

## 2018-01-19 RX ORDER — HEPARIN SODIUM (PORCINE) LOCK FLUSH IV SOLN 100 UNIT/ML 100 UNIT/ML
5 SOLUTION INTRAVENOUS
Status: DISCONTINUED | OUTPATIENT
Start: 2018-01-19 | End: 2018-01-19 | Stop reason: HOSPADM

## 2018-01-19 RX ORDER — PROPOFOL 10 MG/ML
INJECTION, EMULSION INTRAVENOUS PRN
Status: DISCONTINUED | OUTPATIENT
Start: 2018-01-19 | End: 2018-01-19

## 2018-01-19 RX ORDER — LEVOFLOXACIN 500 MG/1
500 TABLET, FILM COATED ORAL DAILY
Qty: 7 TABLET | Refills: 0 | Status: SHIPPED | OUTPATIENT
Start: 2018-01-19 | End: 2018-02-01

## 2018-01-19 RX ADMIN — FENTANYL CITRATE 50 MCG: 50 INJECTION, SOLUTION INTRAMUSCULAR; INTRAVENOUS at 11:02

## 2018-01-19 RX ADMIN — ONDANSETRON 4 MG: 2 INJECTION INTRAMUSCULAR; INTRAVENOUS at 11:37

## 2018-01-19 RX ADMIN — WATER 200 ML: 100 IRRIGANT IRRIGATION at 11:12

## 2018-01-19 RX ADMIN — FENTANYL CITRATE 75 MCG: 50 INJECTION, SOLUTION INTRAMUSCULAR; INTRAVENOUS at 10:43

## 2018-01-19 RX ADMIN — ROCURONIUM BROMIDE 70 MG: 10 INJECTION INTRAVENOUS at 10:43

## 2018-01-19 RX ADMIN — FENTANYL CITRATE 25 MCG: 50 INJECTION, SOLUTION INTRAMUSCULAR; INTRAVENOUS at 11:34

## 2018-01-19 RX ADMIN — SODIUM CHLORIDE, POTASSIUM CHLORIDE, SODIUM LACTATE AND CALCIUM CHLORIDE: 600; 310; 30; 20 INJECTION, SOLUTION INTRAVENOUS at 10:50

## 2018-01-19 RX ADMIN — SIMETHICONE 133 MG: 63.3; 3.7 SOLUTION/ DROPS ORAL at 11:12

## 2018-01-19 RX ADMIN — SODIUM CHLORIDE, POTASSIUM CHLORIDE, SODIUM LACTATE AND CALCIUM CHLORIDE: 600; 310; 30; 20 INJECTION, SOLUTION INTRAVENOUS at 10:25

## 2018-01-19 RX ADMIN — HEPARIN SODIUM (PORCINE) LOCK FLUSH IV SOLN 100 UNIT/ML 5 ML: 100 SOLUTION at 17:23

## 2018-01-19 RX ADMIN — SUGAMMADEX 200 MG: 100 INJECTION, SOLUTION INTRAVENOUS at 11:40

## 2018-01-19 RX ADMIN — PROPOFOL 150 MG: 10 INJECTION, EMULSION INTRAVENOUS at 10:43

## 2018-01-19 RX ADMIN — DEXAMETHASONE SODIUM PHOSPHATE 4 MG: 4 INJECTION, SOLUTION INTRA-ARTICULAR; INTRALESIONAL; INTRAMUSCULAR; INTRAVENOUS; SOFT TISSUE at 10:50

## 2018-01-19 RX ADMIN — FENTANYL CITRATE 25 MCG: 50 INJECTION, SOLUTION INTRAMUSCULAR; INTRAVENOUS at 13:25

## 2018-01-19 RX ADMIN — MIDAZOLAM 1 MG: 1 INJECTION INTRAMUSCULAR; INTRAVENOUS at 10:35

## 2018-01-19 NOTE — DISCHARGE INSTRUCTIONS
[facility]  Same-Day Surgery   Adult Discharge Orders & Instructions     For 24 hours after surgery    1. Get plenty of rest.  A responsible adult must stay with you for at least 24 hours after you leave the hospital.   2. Do not drive or use heavy equipment.  If you have weakness or tingling, don't drive or use heavy equipment until this feeling goes away.  3. Do not drink alcohol.  4. Avoid strenuous or risky activities.  Ask for help when climbing stairs.   5. You may feel lightheaded.  IF so, sit for a few minutes before standing.  Have someone help you get up.   6. If you have nausea (feel sick to your stomach): Drink only clear liquids such as apple juice, ginger ale, broth or 7-Up.  Rest may also help.  Be sure to drink enough fluids.  Move to a regular diet as you feel able.  7. You may have a slight fever. Call the doctor if your fever is over 100.4 F (38 C) (taken under the tongue) or lasts longer than 24 hours.  8. You may have a dry mouth, a sore throat, muscle aches or trouble sleeping.  These should go away after 24 hours.  9. Do not make important or legal decisions.   Call your doctor for any of the followin.  Signs of infection (fever, growing tenderness at the surgery site, a large amount of drainage or bleeding, severe pain, foul-smelling drainage, redness, swelling).    2. It has been over 8 to 10 hours since surgery and you are still not able to urinate (pass water).    3.  Headache for over 24 hours.    To contact a doctor, call Dr. Guru Kelly @ [phone] or:        [phone] and ask for the resident on call for Gastroenterology (answered 24 hours a day)      Emergency Department:    [facility]: [phone]       (TTY for hearing impaired: [phone])

## 2018-01-19 NOTE — ANESTHESIA CARE TRANSFER NOTE
Patient: Mono Varghese    Procedure(s):  Upper Endoscopy with verceal banding; Flexible Sigmoidoscopy - Wound Class: II-Clean Contaminated   - Wound Class: II-Clean Contaminated    Diagnosis:  cirrhosis of liver   Diagnosis Additional Information: No value filed.    Anesthesia Type:   General, RSI, ETT     Note:  Airway :Face Mask  Patient transferred to:PACU  Comments: Transferred to PACU, report given to RN.  DOMINIQUE.  Appears comfortable.  Handoff Report: Identifed the Patient, Identified the Reponsible Provider, Reviewed the pertinent medical history, Discussed the surgical course, Reviewed Intra-OP anesthesia mangement and issues during anesthesia, Set expectations for post-procedure period and Allowed opportunity for questions and acknowledgement of understanding      Vitals: (Last set prior to Anesthesia Care Transfer)    CRNA VITALS  1/19/2018 1119 - 1/19/2018 1204      1/19/2018             Pulse: 95    Temp: 36.3  C (97.3  F)    SpO2: 94 %                Electronically Signed By: Jonathan Carlton MD  January 19, 2018  12:04 PM

## 2018-01-19 NOTE — ANESTHESIA POSTPROCEDURE EVALUATION
Patient: Mono Varghese    Procedure(s):  Upper Endoscopy with verceal banding; Flexible Sigmoidoscopy - Wound Class: II-Clean Contaminated   - Wound Class: II-Clean Contaminated    Diagnosis: cirrhosis of liver   Diagnosis Additional Information: No value filed.    Anesthesia Type:  General, RSI, ETT    Note:  Anesthesia Post Evaluation    Patient location during evaluation: OB PACU  Patient participation: Able to fully participate in evaluation  Level of consciousness: awake and alert  Pain management: adequate  Airway patency: patent  Cardiovascular status: acceptable  Respiratory status: acceptable  Hydration status: acceptable  PONV: none     Anesthetic complications: None          Last vitals:  Vitals:    01/19/18 1500 01/19/18 1515 01/19/18 1530   BP: (!) 133/95 125/83 136/88   Pulse:  77    Resp: 16 18 16   Temp:  36.7  C (98  F)    SpO2: 95% 95% 95%         Electronically Signed By: Luna Boyer MD  January 19, 2018  3:45 PM

## 2018-01-19 NOTE — BRIEF OP NOTE
[facility] Brief Operative Note    Pre-operative diagnosis:  cirrhosis of liver    Post-operative diagnosis * No post-op diagnosis entered *   Procedure: Procedure(s):  Upper Endoscopy with verceal banding; Flexible Sigmoidoscopy - Wound Class: II-Clean Contaminated   - Wound Class: II-Clean Contaminated   Surgeon: Guru Robin MD       Estimated blood loss: None    Specimens: None   Findings:      Upper endoscopy     Grade 3 varices with redwale signs  Portal gastropathy  Duodenum yariel;    Flexible sigmoidoscopy   Prominent rectal veins and small rectal varices no red ruthy signs

## 2018-01-19 NOTE — IP AVS SNAPSHOT
Same Day Surgery [address] [address]    Phone:  [phone]                                       After Visit Summary   1/19/2018    Mono Varghese    MRN: [id number]           After Visit Summary Signature Page     I have received my discharge instructions, and my questions have been answered. I have discussed any challenges I see with this plan with the nurse or doctor.    ..........................................................................................................................................  Patient/Patient Representative Signature      ..........................................................................................................................................  Patient Representative Print Name and Relationship to Patient    ..................................................               ................................................  Date                                            Time    ..........................................................................................................................................  Reviewed by Signature/Title    ...................................................              ..............................................  Date                                                            Time

## 2018-01-19 NOTE — OR NURSING
Approximately 15 minutes into platelet transfusion pt c/o of extreme pain in his spinal cord going up into his head. He said he has never experienced pain in this region before. Transfusion stopped. Blood bank and Dr. Boyer notified.

## 2018-01-19 NOTE — IP AVS SNAPSHOT
MRN:[id number]                      After Visit Summary   1/19/2018    Mono Varghese    MRN: [id number]           Thank you!     Thank you for choosing [facility] for your care. Our goal is always to provide you with excellent care. Hearing back from our patients is one way we can continue to improve our services. Please take a few minutes to complete the written survey that you may receive in the mail after you visit with us. Thank you!        Patient Information     Date Of Birth          1970        About your hospital stay     You were admitted on:  January 19, 2018 You last received care in the:  Same Day Surgery [facility]    You were discharged on:  January 19, 2018       Who to Call     For medical emergencies, please call 911.  For non-urgent questions about your medical care, please call your primary care provider or clinic, [phone]  For questions related to your surgery, please call your surgery clinic        Attending Provider     Provider Specialty    Jordana Ramirez MD Internal Medicine       Primary Care Provider Office Phone # Fax #    [place] [phone]      After Care Instructions     Discharge Instructions       Resume pre procedure diet            Discharge Instructions       Restart home medications.                  Your next 10 appointments already scheduled     Jan 23, 2018  8:45 AM CST   Adult Med Follow UP with Devendra Almanzar MD   [facility] ([facility])    [place] [id number]  [address] [place] [id number] [phone]            Jan 23, 2018 10:30 AM CST   (Arrive by 10:15 AM)   Return Visit with [place] MD   [facility] ([facility])    [address] [id number] [phone]            Mar 07, 2018  2:20 PM CST   (Arrive by 2:05 PM)   Return Visit with Abdelrahman Narayan PA-C    [facility] ([facility])    [address]  4th Floor  [facility] [address]   [phone]            Apr 17, 2018  9:00 AM CDT   Lab with  LAB   [facility] Lab ([facility])    [address]  1st Floor  [facility] [address]   [phone]            Apr 17, 2018  9:40 AM CDT   (Arrive by 9:25 AM)   Return General Liver with Beatriz Tanner MD   [facility] Hepatology ([facility])    [address] [place] [address]  [address]  [facility] [address]   [phone]            Apr 27, 2018 11:30 AM CDT   (Arrive by 11:15 AM)   MR BRAIN W/O & W CONTRAST with [address] MRI ([facility])    [address]  [address] [facility] [address] [phone]           Take your medicines as usual, unless your doctor tells you not to. Bring a list of your current medicines to your exam (including vitamins, minerals and over-the-counter drugs).  You will be given intravenous contrast for this exam. To prepare:   The day before your exam, drink extra fluids at least six 8-ounce glasses (unless your doctor tells you to restrict your fluids).   Have a blood test (creatinine test) within 30 days of your exam. Go to your clinic or Diagnostic Imaging Department for this test.  The MRI machine uses a strong magnet. Please wear clothes without metal (snaps, zippers). A sweatsuit works well, or we may give you a hospital gown.  Please remove any body piercings and hair extensions before you arrive. You will also remove watches, jewelry, hairpins, wallets, dentures, partial dental plates and hearing aids. You may wear contact lenses, and you may be able to wear your rings. We have a safe place to keep your personal items, but it is safer to leave them at home.   **IMPORTANT** THE INSTRUCTIONS BELOW ARE ONLY FOR THOSE PATIENTS WHO HAVE BEEN TOLD THEY WILL  RECEIVE SEDATION OR GENERAL ANESTHESIA DURING THEIR MRI PROCEDURE:  IF YOU WILL RECEIVE SEDATION (take medicine to help you relax during your exam):   You must get the medicine from your doctor before you arrive. Bring the medicine to the exam. Do not take it at home.   Arrive one hour early. Bring someone who can take you home after the test. Your medicine will make you sleepy. After the exam, you may not drive, take a bus or take a taxi by yourself.   No eating 8 hours before your exam. You may have clear liquids up until 4 hours before your exam. (Clear liquids include water, clear tea, black coffee and fruit juice without pulp.)  IF YOU WILL RECEIVE ANESTHESIA (be asleep for your exam):   Arrive 1 1/2 hours early. Bring someone who can take you home after the test. You may not drive, take a bus or take a taxi by yourself.   No eating 8 hours before your exam. You may have clear liquids up until 4 hours before your exam. (Clear liquids include water, clear tea, black coffee and fruit juice without pulp.)  Please call the Imaging Department at your exam site with any questions.            May 04, 2018 11:00 AM CDT   (Arrive by 10:45 AM)   Return Visit with Lauro Shaw MD   [facility] ([facility])    [address] [place] [address]  [address] [place] [address] [phone]              Further instructions from your care team       [facility]  Same-Day Surgery   Adult Discharge Orders & Instructions     For 24 hours after surgery    1. Get plenty of rest.  A responsible adult must stay with you for at least 24 hours after you leave the hospital.   2. Do not drive or use heavy equipment.  If you have weakness or tingling, don't drive or use heavy equipment until this feeling goes away.  3. Do not drink alcohol.  4. Avoid strenuous or risky activities.  Ask for help when climbing stairs.   5. You may feel lightheaded.  IF so, sit for a  "few minutes before standing.  Have someone help you get up.   6. If you have nausea (feel sick to your stomach): Drink only clear liquids such as apple juice, ginger ale, broth or 7-Up.  Rest may also help.  Be sure to drink enough fluids.  Move to a regular diet as you feel able.  7. You may have a slight fever. Call the doctor if your fever is over 100.4 F (38 C) (taken under the tongue) or lasts longer than 24 hours.  8. You may have a dry mouth, a sore throat, muscle aches or trouble sleeping.  These should go away after 24 hours.  9. Do not make important or legal decisions.   Call your doctor for any of the followin.  Signs of infection (fever, growing tenderness at the surgery site, a large amount of drainage or bleeding, severe pain, foul-smelling drainage, redness, swelling).    2. It has been over 8 to 10 hours since surgery and you are still not able to urinate (pass water).    3.  Headache for over 24 hours.    To contact a doctor, call Dr. Guru Kelly @ [phone] or:        [phone] and ask for the resident on call for Gastroenterology (answered 24 hours a day)      Emergency Department:    [facility]: [phone]       (TTY for hearing impaired: [phone])        Pending Results     Date and Time Order Name Status Description    2018 1322 Transfusion reaction evaluation In process     2018 1227 Platelets prepare order unit In process     2018 1107 Platelets prepare order unit In process             Admission Information     Date & Time Provider Department Dept. Phone    2018 Jordana Ramirez MD Same Day Surgery [facility] [phone]      Your Vitals Were     Blood Pressure Pulse Temperature Respirations Height Weight    123/83 77 98  F (36.7  C) (Oral) 16 1.778 m (5' 10\") 97.4 kg (214 lb 11.7 oz)    Pulse Oximetry BMI (Body Mass Index)                97% 30.81 kg/m2          [employer] Information     [employer] gives you secure access to " your electronic health record. If you see a primary care provider, you can also send messages to your care team and make appointments. If you have questions, please call your primary care clinic.  If you do not have a primary care provider, please call [phone] and they will assist you.        Care EveryWhere ID     This is your Care EveryWhere ID. This could be used by other organizations to access your [place] [place] medical records  [id number]        Equal Access to Services     SAMUEL FAIR : Hadward lirao Sojanna, waaxda luqadaha, qaybta kaalmada adelyly, josue tamayo. So [facility] [phone].    ATENCIÓN: Si habla español, tiene a chiang disposición servicios gratuitos de asistencia lingüística. Llame al [phone].    We comply with applicable federal civil rights laws and [place] laws. We do not discriminate on the basis of race, color, national origin, age, disability, sex, sexual orientation, or gender identity.               Review of your medicines      UNREVIEWED medicines. Ask your doctor about these medicines        Dose / Directions    Calcium Carb-Cholecalciferol 500-400 MG-UNIT Tabs   Commonly known as:  calcium 500 +D   Used for:  Malnutrition (H)        Dose:  500 mg   Take 500 mg by mouth daily   Quantity:  90 tablet   Refills:  1       cyanocobalamin 1000 MCG tablet   Commonly known as:  vitamin  B-12   Used for:  Alcoholic cirrhosis of liver with ascites (H)        Dose:  1000 mcg   1 tablet (1,000 mcg) by Per G Tube route daily   Quantity:  130 tablet   Refills:  2       dulaglutide 1.5 MG/0.5ML pen   Commonly known as:  TRULICITY   Used for:  DM type 2, goal HbA1c 7%-8% (H)        Dose:  1.5 mg   Inject 1.5 mg Subcutaneous every 7 days   Quantity:  6 mL   Refills:  1       ferrous sulfate 325 (65 FE) MG tablet   Commonly known as:  IRON   Used for:  Other iron deficiency anemia        Dose:  1 tablet   Take 1 tablet (325 mg) by mouth 2 times daily    Quantity:  200 tablet   Refills:  3       gabapentin 100 MG capsule   Commonly known as:  NEURONTIN   Used for:  Mild episode of recurrent major depressive disorder (H)        Dose:  100 mg   Take 1 capsule (100 mg) by mouth At Bedtime   Quantity:  30 capsule   Refills:  3       glipiZIDE 10 MG 24 hr tablet   Commonly known as:  GLUCOTROL XL   Used for:  DM type 2, goal HbA1c 7%-8% (H)        Dose:  10 mg   Take 1 tablet (10 mg) by mouth daily   Quantity:  90 tablet   Refills:  0       HYDROcodone-acetaminophen 5-325 MG per tablet   Commonly known as:  NORCO   Used for:  Brain tumor (H)        Dose:  2 tablet   Take 2 tablets by mouth every 6 hours as needed for moderate to severe pain   Quantity:  60 tablet   Refills:  0       hydrOXYzine 25 MG tablet   Commonly known as:  ATARAX   Used for:  Itching, Anxiety        Dose:  25 mg   Take 1 tablet (25 mg) by mouth 2 times daily   Quantity:  180 tablet   Refills:  1       lactulose 10 GM/15ML solution   Commonly known as:  CHRONULAC   Used for:  Hepatic encephalopathy (H)        Dose:  5 g   Take 7.5 mLs (5 g) by mouth 2 times daily   Quantity:  473 mL   Refills:  1       levothyroxine 88 MCG tablet   Commonly known as:  SYNTHROID/LEVOTHROID   Used for:  Hypothyroidism        Dose:  88 mcg   Take 1 tablet (88 mcg) by mouth daily   Quantity:  90 tablet   Refills:  3       lidocaine 4 % Crea cream   Commonly known as:  LMX4   Used for:  Port catheter in place        Apply topically once as needed for mild pain   Quantity:  133 g   Refills:  1       methylphenidate 10 MG tablet   Commonly known as:  RITALIN   Used for:  Major depressive disorder with single episode, in partial remission (H)        10mg twice a day   Quantity:  60 tablet   Refills:  0       mirtazapine 45 MG tablet   Commonly known as:  REMERON   Used for:  Major depressive disorder with single episode, in partial remission (H)        Dose:  45 mg   Take 1 tablet (45 mg) by mouth At Bedtime   Quantity:   30 tablet   Refills:  0       multivitamin, therapeutic with minerals Tabs tablet        Dose:  1 tablet   Take 1 tablet by mouth 2 times daily   Refills:  0       omeprazole 20 MG CR capsule   Commonly known as:  priLOSEC   Used for:  Gastroesophageal reflux disease without esophagitis        Dose:  40 mg   Take 2 capsules (40 mg) by mouth 2 times daily   Quantity:  360 capsule   Refills:  3       phenytoin 30 MG CR capsule   Commonly known as:  DILANTIN   Used for:  Oligoastrocytoma of parietal lobe (H)        Dose:  120 mg   Take 4 capsules (120 mg) by mouth 2 times daily   Quantity:  720 capsule   Refills:  1       pravastatin 80 MG tablet   Commonly known as:  PRAVACHOL   Used for:  High cholesterol        TAKE ONE TABLET BY MOUTH ONCE DAILY   Quantity:  90 tablet   Refills:  1       rifaximin 550 MG Tabs tablet   Commonly known as:  XIFAXAN   Used for:  Hepatic encephalopathy (H)        Dose:  550 mg   Take 1 tablet (550 mg) by mouth 2 times daily   Quantity:  60 tablet   Refills:  11       simethicone 80 MG chewable tablet   Commonly known as:  MYLICON   Used for:  Abdominal cramping        Dose:  80 mg   Take 1 tablet (80 mg) by mouth every 6 hours as needed for cramping   Quantity:  60 tablet   Refills:  1       sulfamethoxazole-trimethoprim 800-160 MG per tablet   Commonly known as:  BACTRIM DS/SEPTRA DS   Used for:  Spontaneous bacterial peritonitis (H)        Dose:  1 tablet   Take 1 tablet by mouth 2 times daily   Quantity:  60 tablet   Refills:  5       thiamine 100 MG tablet   Used for:  Alcoholic cirrhosis of liver with ascites (H)        Dose:  100 mg   Take 1 tablet (100 mg) by mouth daily   Quantity:  100 tablet   Refills:  1       traZODone 50 MG tablet   Commonly known as:  DESYREL   Used for:  Primary insomnia        Dose:  50 mg   Take 1 tablet (50 mg) by mouth nightly as needed for sleep   Quantity:  30 tablet   Refills:  3         START taking        Dose / Directions    levofloxacin 500 MG  tablet   Commonly known as:  LEVAQUIN   Used for:  Alcoholic cirrhosis of liver without ascites (H)        Dose:  500 mg   Take 1 tablet (500 mg) by mouth daily   Quantity:  7 tablet   Refills:  0         CONTINUE these medicines which have NOT CHANGED        Dose / Directions    blood glucose monitoring test strip   Commonly known as:  ONETOUCH ULTRA   Used for:  Diabetes mellitus, type 2 (H)        Use to test blood sugars 4 times daily as needed or as directed.   Quantity:  300 each   Refills:  4       CANE, ANY MATERIAL   Used for:  Balance disorder        One cane   Quantity:  1 each   Refills:  0       ONETOUCH LANCETS Misc   Used for:  Type II or unspecified type diabetes mellitus without mention of complication, not stated as uncontrolled        by In Vitro route 4 times daily as needed   Quantity:  100 each   Refills:  prn            Where to get your medicines      These medications were sent to [facility] [address] [place] [address] [facility]  [address] [employer] [address]     Phone:  [phone]     levofloxacin 500 MG tablet               ANTIBIOTIC INSTRUCTION     You've Been Prescribed an Antibiotic - Now What?  Your healthcare team thinks that you or your loved one might have an infection. Some infections can be treated with antibiotics, which are powerful, life-saving drugs. Like all medications, antibiotics have side effects and should only be used when necessary. There are some important things you should know about your antibiotic treatment.      Your healthcare team may run tests before you start taking an antibiotic.    Your team may take samples (e.g., from your blood, urine or other areas) to run tests to look for bacteria. These test can be important to determine if you need an antibiotic at all and, if you do, which antibiotic will work best.      Within a few days, your healthcare team might change or even stop your antibiotic.    Your team may start you on an antibiotic  while they are working to find out what is making you sick.    Your team might change your antibiotic because test results show that a different antibiotic would be better to treat your infection.    In some cases, once your team has more information, they learn that you do not need an antibiotic at all. They may find out that you don't have an infection, or that the antibiotic you're taking won't work against your infection. For example, an infection caused by a virus can't be treated with antibiotics. Staying on an antibiotic when you don't need it is more likely to be harmful than helpful.      You may experience side effects from your antibiotic.    Like all medications, antibiotics have side effects. Some of these can be serious.    Let you healthcare team know if you have any known allergies when you are admitted to the hospital.    One significant side effect of nearly all antibiotics is the risk of severe and sometimes deadly diarrhea caused by Clostridium difficile (C. Difficile). This occurs when a person takes antibiotics because some good germs are destroyed. Antibiotic use allows C. diificile to take over, putting patients at high risk for this serious infection.    As a patient or caregiver, it is important to understand your or your loved one's antibiotic treatment. It is especially important for caregivers to speak up when patients can't speak for themselves. Here are some important questions to ask your healthcare team.    What infection is this antibiotic treating and how do you know I have that infection?    What side effects might occur from this antibiotic?    How long will I need to take this antibiotic?    Is it safe to take this antibiotic with other medications or supplements (e.g., vitamins) that I am taking?     Are there any special directions I need to know about taking this antibiotic? For example, should I take it with food?    How will I be monitored to know whether my infection is  responding to the antibiotic?    What tests may help to make sure the right antibiotic is prescribed for me?      Information provided by:  www.cdc.gov/getsmart  U.S. Department of Health and Human Services  Centers for disease Control and Prevention  National Center for Emerging and Zoonotic Infectious Diseases  Division of Healthcare Quality Promotion         Protect others around you: Learn how to safely use, store and throw away your medicines at www.disposemymeds.org.             Medication List: This is a list of all your medications and when to take them. Check marks below indicate your daily home schedule. Keep this list as a reference.      Medications           Morning Afternoon Evening Bedtime As Needed    blood glucose monitoring test strip   Commonly known as:  ONETOUCH ULTRA   Use to test blood sugars 4 times daily as needed or as directed.                                Calcium Carb-Cholecalciferol 500-400 MG-UNIT Tabs   Commonly known as:  calcium 500 +D   Take 500 mg by mouth daily                                CANE, ANY MATERIAL   One cane                                cyanocobalamin 1000 MCG tablet   Commonly known as:  vitamin  B-12   1 tablet (1,000 mcg) by Per G Tube route daily                                dulaglutide 1.5 MG/0.5ML pen   Commonly known as:  TRULICITY   Inject 1.5 mg Subcutaneous every 7 days                                ferrous sulfate 325 (65 FE) MG tablet   Commonly known as:  IRON   Take 1 tablet (325 mg) by mouth 2 times daily                                gabapentin 100 MG capsule   Commonly known as:  NEURONTIN   Take 1 capsule (100 mg) by mouth At Bedtime                                glipiZIDE 10 MG 24 hr tablet   Commonly known as:  GLUCOTROL XL   Take 1 tablet (10 mg) by mouth daily                                HYDROcodone-acetaminophen 5-325 MG per tablet   Commonly known as:  NORCO   Take 2 tablets by mouth every 6 hours as needed for moderate to severe  pain                                hydrOXYzine 25 MG tablet   Commonly known as:  ATARAX   Take 1 tablet (25 mg) by mouth 2 times daily                                lactulose 10 GM/15ML solution   Commonly known as:  CHRONULAC   Take 7.5 mLs (5 g) by mouth 2 times daily                                levofloxacin 500 MG tablet   Commonly known as:  LEVAQUIN   Take 1 tablet (500 mg) by mouth daily                                levothyroxine 88 MCG tablet   Commonly known as:  SYNTHROID/LEVOTHROID   Take 1 tablet (88 mcg) by mouth daily                                lidocaine 4 % Crea cream   Commonly known as:  LMX4   Apply topically once as needed for mild pain                                methylphenidate 10 MG tablet   Commonly known as:  RITALIN   10mg twice a day                                mirtazapine 45 MG tablet   Commonly known as:  REMERON   Take 1 tablet (45 mg) by mouth At Bedtime                                multivitamin, therapeutic with minerals Tabs tablet   Take 1 tablet by mouth 2 times daily                                omeprazole 20 MG CR capsule   Commonly known as:  priLOSEC   Take 2 capsules (40 mg) by mouth 2 times daily                                ONETOUCH LANCETS Misc   by In Vitro route 4 times daily as needed                                phenytoin 30 MG CR capsule   Commonly known as:  DILANTIN   Take 4 capsules (120 mg) by mouth 2 times daily                                pravastatin 80 MG tablet   Commonly known as:  PRAVACHOL   TAKE ONE TABLET BY MOUTH ONCE DAILY                                rifaximin 550 MG Tabs tablet   Commonly known as:  XIFAXAN   Take 1 tablet (550 mg) by mouth 2 times daily                                simethicone 80 MG chewable tablet   Commonly known as:  MYLICON   Take 1 tablet (80 mg) by mouth every 6 hours as needed for cramping                                sulfamethoxazole-trimethoprim 800-160 MG per tablet   Commonly known as:   BACTRIM DS/SEPTRA DS   Take 1 tablet by mouth 2 times daily                                thiamine 100 MG tablet   Take 1 tablet (100 mg) by mouth daily                                traZODone 50 MG tablet   Commonly known as:  DESYREL   Take 1 tablet (50 mg) by mouth nightly as needed for sleep

## 2018-01-19 NOTE — OR NURSING
Dr Boyer and Dr Kelly at bedside. Reinforced need to be seen immediately if any bleeding occurs. Ok with transfer to phase 2 for discharge. Do not need additional labs at this time. Both platelet transfusions complete, no additional reactions.

## 2018-01-19 NOTE — OR NURSING
Dr. Boyer notified of platelet level of 37. He gave a telephone order for another bag of platelets.

## 2018-01-20 LAB
BACTERIA SPEC CULT: NORMAL
GRAM STN SPEC: NORMAL
Lab: NORMAL
Lab: NORMAL
SPECIMEN SOURCE: NORMAL
SPECIMEN SOURCE: NORMAL

## 2018-01-21 ENCOUNTER — MYC REFILL (OUTPATIENT)
Dept: PSYCHIATRY | Facility: CLINIC | Age: 48
End: 2018-01-21

## 2018-01-21 DIAGNOSIS — F32.4 MAJOR DEPRESSIVE DISORDER WITH SINGLE EPISODE, IN PARTIAL REMISSION (H): ICD-10-CM

## 2018-01-22 ENCOUNTER — CARE COORDINATION (OUTPATIENT)
Dept: GASTROENTEROLOGY | Facility: CLINIC | Age: 48
End: 2018-01-22

## 2018-01-22 DIAGNOSIS — E78.00 HIGH CHOLESTEROL: ICD-10-CM

## 2018-01-22 RX ORDER — MIRTAZAPINE 45 MG/1
45 TABLET, FILM COATED ORAL AT BEDTIME
Qty: 30 TABLET | Refills: 0 | Status: SHIPPED | OUTPATIENT
Start: 2018-01-22 | End: 2018-01-23

## 2018-01-22 RX ORDER — PRAVASTATIN SODIUM 80 MG/1
80 TABLET ORAL DAILY
Qty: 90 TABLET | Refills: 3 | Status: SHIPPED | OUTPATIENT
Start: 2018-01-22 | End: 2019-01-01

## 2018-01-22 NOTE — TELEPHONE ENCOUNTER
pravachol   Last Written Prescription Date: 8/13/17  Last Fill Quantity: 90  # refills: 1  Last Office Visit : 12/20/17  Future Office visit: 1/23/18

## 2018-01-22 NOTE — PROGRESS NOTES
Post Flex sigmoidoscopy and upper GI endoscopy (1/19/2018) with Dr. Kelly: Follow-up    Post procedure recommendations:    Flex sigmoidoscopy: - Will monitor clinical course post esophageal variceal banding   Upper GI endoscopy: - Will recommend repeat endoscopy in 3-4 weeks for variceal surveillance and repeat band ligation as needed - Will monitor clinical course - Given h/o bleeding will give him a course of levaquin 500 mg PO for 7 days - Will proceed with flexible sigmoidoscopy to evaluate for rectal varices and hemorrhoids.     Spoke with patients wife, patient is doing okay. Having some throat pain.  No report of nausea, vomiting or fevers.  Advised repeat upper GI endoscopy is scheduled for February 7th.      Orders placed: None at this time    Contact information verified for future questions/concerns.    Toni VERMA, RN Coordinator  Dr. Aragon, Dr. Dickens & Dr. Kelly  Advanced Endoscopy  [phone]

## 2018-01-22 NOTE — CONSULTS
"Laboratory Medicine and Pathology  Transfusion Medicine- Transfusion Reaction    Mono Varghese MRN# [id number]   YOB: 1970 Age: 47 year old   Date of Reaction: 18             Transfusion Reaction Evaluation   Impression  This reported reaction meets the definition of a non-severe \"other\" transfusion reaction of possible imputability (according to CDC Hemovigilance criteria). This definition suggests that evidence is indeterminate for attributing the adverse reaction to the transfusion or an alternative cause. Per chart review, the patient reported no additional transfusion-related symptoms and has no recent history of back or spinal complications related to his current diagnoses.      Recommendation    Transfuse as needed.       ----------------------------------    History  The patient is a 47 year old male with a history of liver cirrhosis complicated by portal hypertension, thromboses, esophageal varices, ascites, and hepatic encephalopathy, as well as oligoastrocytoma s/p subtotal resection (2013) and chemoradiation/therapy and DMII, admitted on 18 for EGD with variceal banding and flexible sigmoidoscopy.     Post-Transfusion Events  The patient's platelet count on the day of his procedure was reported to be 41. One unit of platelets was subsequently ordered. The patient's transfusion was initiated at 1245. Per nursing notes, 15 minutes into his transfusion, the patient reported spinal pain radiating into his head. He denied any history of similar symptoms. His transfusion was subsequently stopped. He developed no additional symptoms. Vital signs were monitored and remained stable.     Reported Symptoms: Spinal pain    Vital Signs (From EPIC Blood Administration Flowsheet)      Blood Bank Investigation  Product Type: Platelets  Unit Number: [id number]  Amount Remainin mL (per RN report; unit was discarded and not retrievable per nursing staff)   Transfusion History: No " previous transfusions  Transfusion Reaction History: No previous reaction history   Type and Screen History: O positive, antibody screen negative (1/19/18)  Post-Transfusion Clerical Check: Unit bag was not available as it was discarded   ABO/Rh: The unit type was A positive and the patient was O positive. The unit was compatible.  PAT: Negative  Post-Transfusion Plasma: Straw colored    Gram stain: Not performed (unit unavailable)   Unit culture: Not performed (unit unavailable)      [facility] [place] [facility]  Other transfusion reaction  Case Definition: Not applicable  Severity: Non-severe  Imputability: Possible     Dmitriy Granda MS4  Transfusion Medicine  Laboratory Medicine & Pathology     Addendum:  Although the timing of this 'pain episode' fits for a transfusion reaction, the symptoms and clinical picture are not consistent with any typical reaction . The laboratory investigation did not find additional evidence to support diagnosis of a transfusion reaction.   Attestation: I have reviewed this investigation and discussed it with the Transfusion Medicine MS4 and Resident. I agree with the assessment and recommendations in this note.    Bebeto Miranda MD, PhD

## 2018-01-23 ENCOUNTER — OFFICE VISIT (OUTPATIENT)
Dept: PSYCHIATRY | Facility: CLINIC | Age: 48
End: 2018-01-23
Attending: PSYCHIATRY & NEUROLOGY
Payer: MEDICARE

## 2018-01-23 ENCOUNTER — OFFICE VISIT (OUTPATIENT)
Dept: FAMILY MEDICINE | Facility: CLINIC | Age: 48
End: 2018-01-23
Payer: MEDICARE

## 2018-01-23 VITALS
SYSTOLIC BLOOD PRESSURE: 126 MMHG | WEIGHT: 217.4 LBS | DIASTOLIC BLOOD PRESSURE: 83 MMHG | HEART RATE: 82 BPM | RESPIRATION RATE: 20 BRPM | BODY MASS INDEX: 31.19 KG/M2 | OXYGEN SATURATION: 97 %

## 2018-01-23 VITALS
WEIGHT: 215.2 LBS | SYSTOLIC BLOOD PRESSURE: 147 MMHG | BODY MASS INDEX: 30.88 KG/M2 | HEART RATE: 86 BPM | DIASTOLIC BLOOD PRESSURE: 87 MMHG

## 2018-01-23 DIAGNOSIS — F32.4 MAJOR DEPRESSIVE DISORDER WITH SINGLE EPISODE, IN PARTIAL REMISSION (H): ICD-10-CM

## 2018-01-23 DIAGNOSIS — F51.01 PRIMARY INSOMNIA: ICD-10-CM

## 2018-01-23 DIAGNOSIS — Z23 NEED FOR PROPHYLACTIC VACCINATION AND INOCULATION AGAINST INFLUENZA: Primary | ICD-10-CM

## 2018-01-23 DIAGNOSIS — F33.0 MILD EPISODE OF RECURRENT MAJOR DEPRESSIVE DISORDER (H): ICD-10-CM

## 2018-01-23 PROCEDURE — G0463 HOSPITAL OUTPT CLINIC VISIT: HCPCS | Mod: ZF

## 2018-01-23 RX ORDER — METHYLPHENIDATE HYDROCHLORIDE 10 MG/1
TABLET ORAL
Qty: 60 TABLET | Refills: 0 | Status: SHIPPED | OUTPATIENT
Start: 2018-01-23 | End: 2018-01-01

## 2018-01-23 RX ORDER — TRAZODONE HYDROCHLORIDE 50 MG/1
50 TABLET, FILM COATED ORAL
Qty: 30 TABLET | Refills: 3 | Status: SHIPPED | OUTPATIENT
Start: 2018-01-23 | End: 2018-01-01

## 2018-01-23 RX ORDER — METHYLPHENIDATE HYDROCHLORIDE 10 MG/1
10 TABLET ORAL 2 TIMES DAILY
Qty: 60 TABLET | Refills: 0 | Status: ON HOLD | OUTPATIENT
Start: 2018-03-20 | End: 2018-02-12

## 2018-01-23 RX ORDER — METHYLPHENIDATE HYDROCHLORIDE 10 MG/1
10 TABLET ORAL 2 TIMES DAILY
Qty: 60 TABLET | Refills: 0 | Status: ON HOLD | OUTPATIENT
Start: 2018-02-20 | End: 2018-02-12

## 2018-01-23 RX ORDER — GABAPENTIN 100 MG/1
100 CAPSULE ORAL AT BEDTIME
Qty: 30 CAPSULE | Refills: 3 | Status: SHIPPED | OUTPATIENT
Start: 2018-01-23 | End: 2018-01-01

## 2018-01-23 RX ORDER — MIRTAZAPINE 45 MG/1
45 TABLET, FILM COATED ORAL AT BEDTIME
Qty: 30 TABLET | Refills: 3 | Status: SHIPPED | OUTPATIENT
Start: 2018-01-23 | End: 2018-01-01

## 2018-01-23 ASSESSMENT — PAIN SCALES - GENERAL
PAINLEVEL: NO PAIN (0)
PAINLEVEL: NO PAIN (0)

## 2018-01-23 ASSESSMENT — PATIENT HEALTH QUESTIONNAIRE - PHQ9: SUM OF ALL RESPONSES TO PHQ QUESTIONS 1-9: 11

## 2018-01-23 NOTE — NURSING NOTE
Chief Complaint   Patient presents with     Recheck Medication      Major depressive disorder with single episode, in partial remission (H)       Reviewed Allergies, Medications, Pharmacy, Smoking Status, and Pain Level  Administered Abuse Screening Questions   Obtained Weight, Blood Pressure, Heart Rate

## 2018-01-23 NOTE — PROGRESS NOTES
"  PSYCHIATRY CLINIC PROGRESS NOTE   The initial diagnostic evaluation was on 8/13/14.  Date of the most recent transfer of care radha is 10/05/16    Pertinent Background:  This patient first experienced mental health issues in childhood and has received treatment for ADHD, depression.  See transfer evaluation for detailed history.  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 Found down by wife w/ variceal bleeding and was in coma in 2016, ?anoxic brain injury.     Labs labs show pancytopenia at baseline, elevated TSH w/ nl T4, MoCA 10/30 during 8/17.  No brain MRI available for review.     Depressive symptoms are likely of multifactorial etiology including underlying depressive diathesis vs major depression complicated by neurocognitive impairment, LENA and situational factors (can no longer do the work he loves). Sub-clinical hypothyroidism may also be contributing. Mood lability would be better treated by a different anti-epileptic than phenytoin (i.e. Valproic acid) but most have hepatic metabolism and we are limited by liver disease. Agree w/ referral for neurocognitive testing. Will start methylphenidate today after consultation with neurologist Dr. English. Advised patient of side effects risks, including seizure and EtOH relapse.      Psych critical item history includes trauma hx and substance use treatment .    INTERIM HISTORY                         " "                        Mono Varghese is a 47 year old male who was last seen on 17 for transfer eval on at which time mirtazapine was increased.  The patient reports good treatment adherence.  History was provided by the patient who was a fair historian.  Since the last visit:  Seeing psychologist Cristiano Herring at [facility] in [place] who is \"outstanding\".  Seeing 1x/wk for 1.5 months.                      Feels \"pregnant\" from worsened ascites. Paracentesis helps for a week but then feels bad again. Told by GI he has ~1yr prognosis. At last banding varices were noted to be bad.     Concentration has improved, able to play tablet for hours, anger and frustration have improved substantially per patient and wife.     Sleep is \"good\".     RECENT SYMPTOMS:   DEPRESSION:  reports-depressed mood, anhedonia and low energy;  DENIES- suicidal ideation, poor concentration /memory, psychomotor changes [none], feeling hopeless, excessive crying and overwhelmed  SLEEP:  improved    EATING DISORDER: none    RECENT SUBSTANCE USE:     ALCOHOL- none          TOBACCO- none               CAFFEINE- rare sodies  OPIOIDS- occasional for pain       NARCAN KIT- N/A       CANNABIS- none          OTHER ILLICIT DRUGS- none     CURRENT SOCIAL HISTORY:  FINANCIAL SUPPORT- UNKNOWN       CHILDREN- one child         LIVING SITUATION- housed with wife      SOCIAL/ SPIRITUAL SUPPORT- unknown       FEELS SAFE AT HOME- Yes    MEDICAL ROS:  Reports imbalance, tremor Denies weight gain, sedation and dizziness, seizure, visual changes, HA.     PSYCH and CD Critical Summary Points since 2017           None    PAST PSYCH MED TRIALS   see EMR Problem List: Hx of psychiatric care    MEDICAL / SURGICAL HISTORY                                   CARE TEAM:   PCP- [facility] primary care Oncologist  Neurosurg Dr. Aranda  Hepatology Dr. Callahan          Therapist- Donald Herring     Neurologic Hx " [head injury etc]:  Craniotomy, ? Traumatic fall  Patient Active Problem List   Diagnosis     Type 2 diabetes mellitus (H)     Moderate major depression (H)     LENA (obstructive sleep apnea)-moderate (AHI 16)     Oligoastrocytoma of parietal lobe (H)     ADHD (attention deficit hyperactivity disorder)     Financial difficulties     Thrombocytopenia (H)     Partial epilepsy with impairment of consciousness (H)     Cirrhosis of liver (H)     Pulmonary nodules     Myopic astigmatism of both eyes     Presbyopia     Ringing in ears, unspecified laterality     GIB (gastrointestinal bleeding)     Portal vein thrombosis     Advance care planning     Chronic pain     Hyperlipidemia LDL goal <100     Pancytopenia (H)     Hypothyroidism     Malnutrition (H)     Hx of psychiatric care     Encephalopathy, hepatic (H)       ALLERGY                                No clinical screening - see comments; Tegaderm transparent dressing (informational only); and Latex  MEDICATIONS                               Current Outpatient Prescriptions   Medication Sig Dispense Refill     mirtazapine (REMERON) 45 MG tablet Take 1 tablet (45 mg) by mouth At Bedtime 30 tablet 0     pravastatin (PRAVACHOL) 80 MG tablet Take 1 tablet (80 mg) by mouth daily 90 tablet 3     levofloxacin (LEVAQUIN) 500 MG tablet Take 1 tablet (500 mg) by mouth daily 7 tablet 0     Calcium Carb-Cholecalciferol (CALCIUM 500 +D) 500-400 MG-UNIT TABS Take 500 mg by mouth daily 90 tablet 1     thiamine (VITAMIN B-1) 100 MG tablet Take 1 tablet (100 mg) by mouth daily 100 tablet 1     simethicone (MYLICON) 80 MG chewable tablet Take 1 tablet (80 mg) by mouth every 6 hours as needed for cramping 60 tablet 1     methylphenidate (RITALIN) 10 MG tablet 10mg twice a day 60 tablet 0     lidocaine (LMX4) 4 % CREA cream Apply topically once as needed for mild pain 133 g 1     CANE, ANY MATERIAL One cane 1 each 0     HYDROcodone-acetaminophen (NORCO) 5-325 MG per tablet Take 2 tablets  by mouth every 6 hours as needed for moderate to severe pain 60 tablet 0     phenytoin (DILANTIN) 30 MG CR capsule Take 4 capsules (120 mg) by mouth 2 times daily 720 capsule 1     glipiZIDE (GLUCOTROL XL) 10 MG 24 hr tablet Take 1 tablet (10 mg) by mouth daily (Patient taking differently: Take 10 mg by mouth every morning ) 90 tablet 0     omeprazole (PRILOSEC) 20 MG CR capsule Take 2 capsules (40 mg) by mouth 2 times daily 360 capsule 3     dulaglutide (TRULICITY) 1.5 MG/0.5ML pen Inject 1.5 mg Subcutaneous every 7 days (Patient taking differently: Inject 1.5 mg Subcutaneous every 7 days Tuesday) 6 mL 1     hydrOXYzine (ATARAX) 25 MG tablet Take 1 tablet (25 mg) by mouth 2 times daily 180 tablet 1     cyanocobalamin (VITAMIN  B-12) 1000 MCG tablet 1 tablet (1,000 mcg) by Per G Tube route daily (Patient taking differently: 1,000 mcg by Per G Tube route every morning ) 130 tablet 2     sulfamethoxazole-trimethoprim (BACTRIM DS/SEPTRA DS) 800-160 MG per tablet Take 1 tablet by mouth 2 times daily 60 tablet 5     gabapentin (NEURONTIN) 100 MG capsule Take 1 capsule (100 mg) by mouth At Bedtime 30 capsule 3     traZODone (DESYREL) 50 MG tablet Take 1 tablet (50 mg) by mouth nightly as needed for sleep 30 tablet 3     lactulose (CHRONULAC) 10 GM/15ML solution Take 7.5 mLs (5 g) by mouth 2 times daily 473 mL 1     rifaximin (XIFAXAN) 550 MG TABS tablet Take 1 tablet (550 mg) by mouth 2 times daily 60 tablet 11     blood glucose monitoring (ONE TOUCH ULTRA) test strip Use to test blood sugars 4 times daily as needed or as directed. 300 each 4     levothyroxine (SYNTHROID/LEVOTHROID) 88 MCG tablet Take 1 tablet (88 mcg) by mouth daily (Patient taking differently: Take 88 mcg by mouth every morning ) 90 tablet 3     ferrous sulfate (IRON) 325 (65 FE) MG tablet Take 1 tablet (325 mg) by mouth 2 times daily 200 tablet 3     multivitamin, therapeutic with minerals (THERA-VIT-M) TABS Take 1 tablet by mouth 2 times daily        ONE TOUCH LANCETS MISC by In Vitro route 4 times daily as needed 100 each prn     VITALS   /87  Pulse 86  Wt 97.6 kg (215 lb 3.2 oz)  BMI 30.88 kg/m2   MENTAL STATUS EXAM                                                           Alertness: alert  and slow to respond  Appearance: casually groomed  Behavior/Demeanor: cooperative, pleasant and calm, with fair  eye contact   Speech: increased latency of response and slowed but improved  Language: word finding difficulty improved  Psychomotor: normal or unremarkable  Mood: better  Affect: restricted but more reactive and appropriate; was congruent to mood; was congruent to content  Thought Process/Associations: thought blocking  Thought Content:  Reports none;  Denies suicidal and violent ideation  Perception:  Reports none;  Denies auditory hallucinations and visual hallucinations  Insight: good  Judgment: good  Cognition: does not appear grossly intact; formal cognitive testing was not done    LABS and DATA   RATING SCALES:  N/A    PHQ9 TODAY = 11  PHQ-9 SCORE 2/22/2017 8/17/2017 10/19/2017   Total Score - - -   Total Score 14 18 13     DIAGNOSIS     Moderate episode of recurrent major depressive disorder (H)  Mild episode of recurrent major depressive disorder (H)  Primary insomnia  Obstructive sleep apnea  Neurocognitive disorder    ASSESSMENT                                     47 yo M h/o oligoastrocytoma s/p resection + xrt 2013, anoxic brain injury?, EtOH cirrhosis, hypothyroidism, pancytopenia, cognitive impairment, seizure disorder, muliple childhood AEs, major depression. Mood, energy and sleep continue improved, attention and memory improved .  Exam improved w/ brighter more reactive affect, sustained attention and faster responses, unchanged low amplitude choreoathetotic movements in right hand, no SI/HI, no perceptual disturbances. Labs labs show pancytopenia at baseline, elevated TSH w/ nl T4, MoCA 10/30 during 8/17.  No brain MRI available for  review. Exhibits good response to methylphenidate and increased mirtazapine. Continue plan.     [place] PRESCRIPTION MONITORING PROGRAM [] was not checked today:  will be checked next visit.    PSYCHOTROPIC DRUG INTERACTIONS:   PHENYTOIN and TRAZODONE may result in increased phenytoin serum concentrations and an increased risk of phenytoin toxicity (ataxia, hyperreflexia, nystagmus, tremor).   PHENYTOIN and HYDROXYZINE may result in decreased phenytoin and/ or theophylline exposure.    MIRTAZAPINE and SEROTONERGIC AGENTS may result in increased risk of serotonin syndrome.   MANAGEMENT:  Monitoring for adverse effects     PLAN                                                                                                       1) PSYCHOTROPIC MEDICATIONS:  - Continue mirtazapine to 45mg QHS  -Start methylphenidate 10mg qAM, increase to BID after one week as tolerated.   -continue gabapentin 100mg QHS  -Continue trazadone 50mg QHS    2) THERAPY:    Continue  TREATMENT PLAN   Date revised  -  NA   Date next due- NA    3) NEXT DUE:    Labs- N/A  EKG- N/A   Rating Scales- N/A    4) REFERRALS:    NONE    5) RTC: 3 months or PRN    6) CRISIS NUMBERS:   Provided routinely in AVS.  Especially emphasized:  [facility] [phone] (clinic)    [phone] (after hours)  TREATMENT RISK STATEMENT:  The risks, benefits, alternatives and potential adverse effects have been discussed and are understood by the pt. The pt understands the risks of using street drugs or alcohol. There are no medical contraindications, the pt agrees to treatment with the ability to do so. The pt knows to call the clinic for any problems or to access emergency care if needed.  Medical and substance use concerns are documented above.  Psychotropic drug interaction check was done, including changes made today.      Patient staffed in clinic with Dr. Roa who will sign the note.  Supervisor is Dr. Magana  I saw the patient with the resident, and  participated in key portions of the service, including the mental status examination and developing the plan of care. I reviewed key portions of the history with the resident. I agree with the findings and plan as documented in this note.    Radha Roa

## 2018-01-23 NOTE — PATIENT INSTRUCTIONS
[facility]: [phone]     [facility] Medication Refill Request Information:  * Please contact your pharmacy regarding ANY request for medication refills.  ** [facility] Prescription Fax = [phone]  * Please allow 3 business days for routine medication refills.  * Please allow 5 business days for controlled substance medication refills.     [facility] Test Result notification information:  *You will be notified with in 7-10 days of your appointment day regarding the results of your test.  If you are on MyChart you will be notified as soon as the provider has reviewed the results and signed off on them.

## 2018-01-23 NOTE — MR AVS SNAPSHOT
After Visit Summary   1/23/2018    Mono Varghese    MRN: [id number]           Patient Information     Date Of Birth          1970        Visit Information        Provider Department      1/23/2018 10:30 AM King Louis MD [facility]        Today's Diagnoses     Need for prophylactic vaccination and inoculation against influenza    -  1      Care Instructions    [facility]: [phone]     [facility] Medication Refill Request Information:  * Please contact your pharmacy regarding ANY request for medication refills.  ** [facility] Prescription Fax = [phone]  * Please allow 3 business days for routine medication refills.  * Please allow 5 business days for controlled substance medication refills.     Primary Care Center Test Result notification information:  *You will be notified with in 7-10 days of your appointment day regarding the results of your test.  If you are on MyChart you will be notified as soon as the provider has reviewed the results and signed off on them.          Follow-ups after your visit        Follow-up notes from your care team     Return in about 2 months (around 3/23/2018).      Your next 10 appointments already scheduled     Feb 07, 2018   Procedure with Guru Jose Kelly MD   [facility] [place], Same Day Surgery (--)    [address], [place] [address] [phone]            Mar 07, 2018  2:20 PM CST   (Arrive by 2:05 PM)   Return Visit with Abdelrahman Narayan PA-C   [facility] ([facility])    [address] [place] [address]   [phone]            Mar 20, 2018  9:30 AM CDT   (Arrive by 9:15 AM)   Return Visit with King Louis MD   [facility] ([facility])    [address] [place] [id number] [phone]            Apr 17, 2018  9:00 AM CDT   Lab with JEFF  LAB    [facility] ([facility])    [address]  1st Floor  [place] [address]   [phone]            Apr 17, 2018  9:40 AM CDT   (Arrive by 9:25 AM)   Return General Liver with Beatriz Tanner MD   [facility]atology ([facility])    [address]  [address]  [place] [address]   [phone]            Apr 27, 2018 11:30 AM CDT   (Arrive by 11:15 AM)   MR BRAIN W/O & W CONTRAST with UCMR1   [facility] ([facility])    [address]  1st Floor  [place] [address] [phone]           Take your medicines as usual, unless your doctor tells you not to. Bring a list of your current medicines to your exam (including vitamins, minerals and over-the-counter drugs).  You will be given intravenous contrast for this exam. To prepare:   The day before your exam, drink extra fluids at least six 8-ounce glasses (unless your doctor tells you to restrict your fluids).   Have a blood test (creatinine test) within 30 days of your exam. Go to your clinic or Diagnostic Imaging Department for this test.  The MRI machine uses a strong magnet. Please wear clothes without metal (snaps, zippers). A sweatsuit works well, or we may give you a hospital gown.  Please remove any body piercings and hair extensions before you arrive. You will also remove watches, jewelry, hairpins, wallets, dentures, partial dental plates and hearing aids. You may wear contact lenses, and you may be able to wear your rings. We have a safe place to keep your personal items, but it is safer to leave them at home.   **IMPORTANT** THE INSTRUCTIONS BELOW ARE ONLY FOR THOSE PATIENTS WHO HAVE BEEN TOLD THEY WILL RECEIVE SEDATION OR GENERAL ANESTHESIA DURING THEIR MRI PROCEDURE:  IF YOU WILL RECEIVE SEDATION (take medicine to help you relax during your exam):   You must get the medicine from your doctor before you  arrive. Bring the medicine to the exam. Do not take it at home.   Arrive one hour early. Bring someone who can take you home after the test. Your medicine will make you sleepy. After the exam, you may not drive, take a bus or take a taxi by yourself.   No eating 8 hours before your exam. You may have clear liquids up until 4 hours before your exam. (Clear liquids include water, clear tea, black coffee and fruit juice without pulp.)  IF YOU WILL RECEIVE ANESTHESIA (be asleep for your exam):   Arrive 1 1/2 hours early. Bring someone who can take you home after the test. You may not drive, take a bus or take a taxi by yourself.   No eating 8 hours before your exam. You may have clear liquids up until 4 hours before your exam. (Clear liquids include water, clear tea, black coffee and fruit juice without pulp.)  Please call the Imaging Department at your exam site with any questions.            May 04, 2018 11:00 AM CDT   (Arrive by 10:45 AM)   Return Visit with Lauro Shaw MD   [facility] ([facility])    [address] [place] [address]  [address] [place] [address] [phone]              Who to contact     Please call your clinic at [phone] to:    Ask questions about your health    Make or cancel appointments    Discuss your medicines    Learn about your test results    Speak to your doctor   If you have compliments or concerns about an experience at your clinic, or if you wish to file a complaint, please contact [facility] Patient Relations at [phone] or email us at [email or web address]         Additional Information About Your Visit        MyChart Information     [facility] gives you secure access to your electronic health record. If you see a primary care provider, you can also send messages to your care team and make appointments. If you have questions, please call your primary care clinic.  If you do not have a primary care  provider, please call [phone] and they will assist you.      [facility] is an electronic gateway that provides easy, online access to your medical records. With [employer], you can request a clinic appointment, read your test results, renew a prescription or communicate with your care team.     To access your existing account, please contact your [facility] or call [phone] for assistance.        Care EveryWhere ID     This is your Care EveryWhere ID. This could be used by other organizations to access your [facility] medical records  [id number]        Your Vitals Were     Pulse Respirations Pulse Oximetry BMI (Body Mass Index)          82 20 97% 31.19 kg/m2         Blood Pressure from Last 3 Encounters:   01/23/18 126/83   01/19/18 (!) 138/92   01/03/18 138/81    Weight from Last 3 Encounters:   01/23/18 98.6 kg (217 lb 6.4 oz)   01/19/18 97.4 kg (214 lb 11.7 oz)   01/03/18 99.9 kg (220 lb 3.2 oz)              Today, you had the following     No orders found for display         Today's Medication Changes          These changes are accurate as of: 1/23/18 11:21 AM.  If you have any questions, ask your nurse or doctor.               These medicines have changed or have updated prescriptions.        Dose/Directions    cyanocobalamin 1000 MCG tablet   Commonly known as:  vitamin  B-12   This may have changed:  when to take this   Used for:  Alcoholic cirrhosis of liver with ascites (H)        Dose:  1000 mcg   1 tablet (1,000 mcg) by Per G Tube route daily   Quantity:  130 tablet   Refills:  2       dulaglutide 1.5 MG/0.5ML pen   Commonly known as:  TRULICITY   This may have changed:  additional instructions   Used for:  DM type 2, goal HbA1c 7%-8% (H)        Dose:  1.5 mg   Inject 1.5 mg Subcutaneous every 7 days   Quantity:  6 mL   Refills:  1       glipiZIDE 10 MG 24 hr tablet   Commonly known as:  GLUCOTROL XL   This may have changed:  when to take this   Used for:  DM type 2, goal  HbA1c 7%-8% (H)        Dose:  10 mg   Take 1 tablet (10 mg) by mouth daily   Quantity:  90 tablet   Refills:  0       levothyroxine 88 MCG tablet   Commonly known as:  SYNTHROID/LEVOTHROID   This may have changed:  when to take this   Used for:  Hypothyroidism        Dose:  88 mcg   Take 1 tablet (88 mcg) by mouth daily   Quantity:  90 tablet   Refills:  3       * methylphenidate 10 MG tablet   Commonly known as:  RITALIN   This may have changed:  Another medication with the same name was added. Make sure you understand how and when to take each.   Used for:  Major depressive disorder with single episode, in partial remission (H)   Changed by:  Devendra Almanzar MD        10mg twice a day   Quantity:  60 tablet   Refills:  0       * methylphenidate 10 MG tablet   Commonly known as:  RITALIN   This may have changed:  You were already taking a medication with the same name, and this prescription was added. Make sure you understand how and when to take each.   Used for:  Major depressive disorder with single episode, in partial remission (H)   Changed by:  Devendra Almanzar MD        Dose:  10 mg   Start taking on:  2/20/2018   Take 1 tablet (10 mg) by mouth 2 times daily   Quantity:  60 tablet   Refills:  0       * methylphenidate 10 MG tablet   Commonly known as:  RITALIN   This may have changed:  You were already taking a medication with the same name, and this prescription was added. Make sure you understand how and when to take each.   Used for:  Major depressive disorder with single episode, in partial remission (H)   Changed by:  Devendra Almanzar MD        Dose:  10 mg   Start taking on:  3/20/2018   Take 1 tablet (10 mg) by mouth 2 times daily   Quantity:  60 tablet   Refills:  0       * Notice:  This list has 3 medication(s) that are the same as other medications prescribed for you. Read the directions carefully, and ask your doctor or other care provider to review them with you.         Where to get your  medicines      These medications were sent to [facility] [address] [place], [place] - [address] [address]  [address] [facility] [address]     Phone:  [phone]     gabapentin 100 MG capsule    mirtazapine 45 MG tablet    traZODone 50 MG tablet         Some of these will need a paper prescription and others can be bought over the counter.  Ask your nurse if you have questions.     Bring a paper prescription for each of these medications     methylphenidate 10 MG tablet    methylphenidate 10 MG tablet    methylphenidate 10 MG tablet                Primary Care Provider Office Phone # Fax #    King Louis [phone]        [address] [address]        Equal Access to Services     SAMUEL FAIR : Hadii kevin lirao Martha, waaxda juan, qaybta kaalmada adelyly, josue tamayo. So [facility] [phone].    ATENCIÓN: Si habla español, tiene a chiang disposición servicios gratuitos de asistencia lingüística. [facility] [phone].    We comply with applicable federal civil rights laws and [place] laws. We do not discriminate on the basis of race, color, national origin, age, disability, sex, sexual orientation, or gender identity.            Thank you!     Thank you for choosing [facility]  for your care. Our goal is always to provide you with excellent care. Hearing back from our patients is one way we can continue to improve our services. Please take a few minutes to complete the written survey that you may receive in the mail after your visit with us. Thank you!             Your Updated Medication List - Protect others around you: Learn how to safely use, store and throw away your medicines at www.disposemymeds.org.          This list is accurate as of: 1/23/18 11:21 AM.  Always use your most recent med list.                   Brand Name Dispense Instructions for use Diagnosis    blood glucose monitoring test strip     ONETOUCH ULTRA    300 each    Use to test blood sugars 4 times daily as needed or as directed.    Diabetes mellitus, type 2 (H)       Calcium Carb-Cholecalciferol 500-400 MG-UNIT Tabs    calcium 500 +D    90 tablet    Take 500 mg by mouth daily    Malnutrition (H)       CANE, ANY MATERIAL     1 each    One cane    Balance disorder       cyanocobalamin 1000 MCG tablet    vitamin  B-12    130 tablet    1 tablet (1,000 mcg) by Per G Tube route daily    Alcoholic cirrhosis of liver with ascites (H)       dulaglutide 1.5 MG/0.5ML pen    TRULICITY    6 mL    Inject 1.5 mg Subcutaneous every 7 days    DM type 2, goal HbA1c 7%-8% (H)       ferrous sulfate 325 (65 FE) MG tablet    IRON    200 tablet    Take 1 tablet (325 mg) by mouth 2 times daily    Other iron deficiency anemia       gabapentin 100 MG capsule    NEURONTIN    30 capsule    Take 1 capsule (100 mg) by mouth At Bedtime    Mild episode of recurrent major depressive disorder (H)       glipiZIDE 10 MG 24 hr tablet    GLUCOTROL XL    90 tablet    Take 1 tablet (10 mg) by mouth daily    DM type 2, goal HbA1c 7%-8% (H)       HYDROcodone-acetaminophen 5-325 MG per tablet    NORCO    60 tablet    Take 2 tablets by mouth every 6 hours as needed for moderate to severe pain    Brain tumor (H)       hydrOXYzine 25 MG tablet    ATARAX    180 tablet    Take 1 tablet (25 mg) by mouth 2 times daily    Itching, Anxiety       lactulose 10 GM/15ML solution    CHRONULAC    473 mL    Take 7.5 mLs (5 g) by mouth 2 times daily    Hepatic encephalopathy (H)       levofloxacin 500 MG tablet    LEVAQUIN    7 tablet    Take 1 tablet (500 mg) by mouth daily    Alcoholic cirrhosis of liver without ascites (H)       levothyroxine 88 MCG tablet    SYNTHROID/LEVOTHROID    90 tablet    Take 1 tablet (88 mcg) by mouth daily    Hypothyroidism       lidocaine 4 % Crea cream    LMX4    133 g    Apply topically once as needed for mild pain    Port catheter in place       * methylphenidate 10 MG  tablet    RITALIN    60 tablet    10mg twice a day    Major depressive disorder with single episode, in partial remission (H)       * methylphenidate 10 MG tablet   Start taking on:  2/20/2018    RITALIN    60 tablet    Take 1 tablet (10 mg) by mouth 2 times daily    Major depressive disorder with single episode, in partial remission (H)       * methylphenidate 10 MG tablet   Start taking on:  3/20/2018    RITALIN    60 tablet    Take 1 tablet (10 mg) by mouth 2 times daily    Major depressive disorder with single episode, in partial remission (H)       mirtazapine 45 MG tablet    REMERON    30 tablet    Take 1 tablet (45 mg) by mouth At Bedtime    Major depressive disorder with single episode, in partial remission (H)       multivitamin, therapeutic with minerals Tabs tablet      Take 1 tablet by mouth 2 times daily        omeprazole 20 MG CR capsule    priLOSEC    360 capsule    Take 2 capsules (40 mg) by mouth 2 times daily    Gastroesophageal reflux disease without esophagitis       ONETOUCH LANCETS Misc     100 each    by In Vitro route 4 times daily as needed    Type II or unspecified type diabetes mellitus without mention of complication, not stated as uncontrolled       phenytoin 30 MG CR capsule    DILANTIN    720 capsule    Take 4 capsules (120 mg) by mouth 2 times daily    Oligoastrocytoma of parietal lobe (H)       pravastatin 80 MG tablet    PRAVACHOL    90 tablet    Take 1 tablet (80 mg) by mouth daily    High cholesterol       rifaximin 550 MG Tabs tablet    XIFAXAN    60 tablet    Take 1 tablet (550 mg) by mouth 2 times daily    Hepatic encephalopathy (H)       simethicone 80 MG chewable tablet    MYLICON    60 tablet    Take 1 tablet (80 mg) by mouth every 6 hours as needed for cramping    Abdominal cramping       sulfamethoxazole-trimethoprim 800-160 MG per tablet    BACTRIM DS/SEPTRA DS    60 tablet    Take 1 tablet by mouth 2 times daily    Spontaneous bacterial peritonitis (H)       thiamine 100  MG tablet     100 tablet    Take 1 tablet (100 mg) by mouth daily    Alcoholic cirrhosis of liver with ascites (H)       traZODone 50 MG tablet    DESYREL    30 tablet    Take 1 tablet (50 mg) by mouth nightly as needed for sleep    Primary insomnia       * Notice:  This list has 3 medication(s) that are the same as other medications prescribed for you. Read the directions carefully, and ask your doctor or other care provider to review them with you.

## 2018-01-23 NOTE — PROGRESS NOTES
[facility]  Primary Care Center   King Louis MD  1/23/2018     Chief Complaint:   Follow-up.     History of Present Illness:   Mono Varghese is a 47 year old male with a history of diabetes mellitus, major depression, LENA, oligoastrocytoma of parietal lobe, ADHD, liver cirrhosis, chronic pain, and hyperlipidemia among others, who presents with his wife for follow-up. I last evaluated the patient on 12/20/2017, please see that note for further details. US paracentesis was recommended for increased abdominal distention at that time, likely secondary to ascites.      Diabetes mellitus: The patient brings in his glucose meter today for my review. He indicates his readings have been improving with his current medication regimen.     Cough: He continues to have symptoms of a cough, present for the last 1.5 months. The cough is non-productive. Azithromycin was prescribed at the time of our last visit, however, this was not helpful for him.     Gastrointestinal bleeding: The patient underwent an upper GI endoscopy and flexible sigmoidoscopy on 1/19/2018. The upper endoscopy was significant for grade III esophageal varices with red ruthy signs. Variceal banding was performed with placement of 7 bands. Repeat endoscopy was recommended in 3-4 weeks and is scheduled for 2/9/2018. Flexible sigmoidoscopy was significant for non-thrombosed external hemorrhoids as well as internal hemorrhoids found during retroflexion. The patient continues to have rectal bleeding, approximately one cup is noted in the bowl after bowel movements.     Abdominal distention/Liver failure: Paracentesis was performed on 12/26/2017 and then again on 1/19/2018. He is scheduled for follow up in February per his wife's report.     Mood: He has been visiting with a psychologist once per week and feels this is very helpful. He is also following with a psychiatrist, who he saw earlier this morning, and medication management was discussed.     Other  concerns discussed:  1. He mentions he needs a flu shot.     Recent Imaging/Procedures:   US Paracentesis (12/26/2017):  Uncomplicated ultrasound-guided paracentesis. A total of  3700 mL of ascitic fluid was aspirated for diagnostic and therapeutic  Purposes. Per King Casarez MD.     Chest XR 2 views (12/20/2017):  No acute cardiopulmonary findings. Per Radiology.      Review of Systems:   Pertinent items are noted in HPI.  All other systems are negative.    Active Medications:      mirtazapine (REMERON) 45 MG tablet, Take 1 tablet (45 mg) by mouth At Bedtime, Disp: 30 tablet, Rfl: 0     pravastatin (PRAVACHOL) 80 MG tablet, Take 1 tablet (80 mg) by mouth daily, Disp: 90 tablet, Rfl: 3     levofloxacin (LEVAQUIN) 500 MG tablet, Take 1 tablet (500 mg) by mouth daily, Disp: 7 tablet, Rfl: 0     Calcium Carb-Cholecalciferol (CALCIUM 500 +D) 500-400 MG-UNIT TABS, Take 500 mg by mouth daily, Disp: 90 tablet, Rfl: 1     thiamine (VITAMIN B-1) 100 MG tablet, Take 1 tablet (100 mg) by mouth daily, Disp: 100 tablet, Rfl: 1     simethicone (MYLICON) 80 MG chewable tablet, Take 1 tablet (80 mg) by mouth every 6 hours as needed for cramping, Disp: 60 tablet, Rfl: 1     methylphenidate (RITALIN) 10 MG tablet, 10mg twice a day, Disp: 60 tablet, Rfl: 0     lidocaine (LMX4) 4 % CREA cream, Apply topically once as needed for mild pain, Disp: 133 g, Rfl: 1     CANE, ANY MATERIAL, One cane, Disp: 1 each, Rfl: 0     HYDROcodone-acetaminophen (NORCO) 5-325 MG per tablet, Take 2 tablets by mouth every 6 hours as needed for moderate to severe pain, Disp: 60 tablet, Rfl: 0     phenytoin (DILANTIN) 30 MG CR capsule, Take 4 capsules (120 mg) by mouth 2 times daily, Disp: 720 capsule, Rfl: 1     glipiZIDE (GLUCOTROL XL) 10 MG 24 hr tablet, Take 1 tablet (10 mg) by mouth daily (Patient taking differently: Take 10 mg by mouth every morning ), Disp: 90 tablet, Rfl: 0     omeprazole (PRILOSEC) 20 MG CR capsule, Take 2 capsules (40 mg) by mouth  2 times daily, Disp: 360 capsule, Rfl: 3     dulaglutide (TRULICITY) 1.5 MG/0.5ML pen, Inject 1.5 mg Subcutaneous every 7 days (Patient taking differently: Inject 1.5 mg Subcutaneous every 7 days Tuesday), Disp: 6 mL, Rfl: 1     hydrOXYzine (ATARAX) 25 MG tablet, Take 1 tablet (25 mg) by mouth 2 times daily, Disp: 180 tablet, Rfl: 1     cyanocobalamin (VITAMIN  B-12) 1000 MCG tablet, 1 tablet (1,000 mcg) by Per G Tube route daily (Patient taking differently: 1,000 mcg by Per G Tube route every morning ), Disp: 130 tablet, Rfl: 2     sulfamethoxazole-trimethoprim (BACTRIM DS/SEPTRA DS) 800-160 MG per tablet, Take 1 tablet by mouth 2 times daily, Disp: 60 tablet, Rfl: 5     gabapentin (NEURONTIN) 100 MG capsule, Take 1 capsule (100 mg) by mouth At Bedtime, Disp: 30 capsule, Rfl: 3     traZODone (DESYREL) 50 MG tablet, Take 1 tablet (50 mg) by mouth nightly as needed for sleep, Disp: 30 tablet, Rfl: 3     lactulose (CHRONULAC) 10 GM/15ML solution, Take 7.5 mLs (5 g) by mouth 2 times daily, Disp: 473 mL, Rfl: 1     rifaximin (XIFAXAN) 550 MG TABS tablet, Take 1 tablet (550 mg) by mouth 2 times daily, Disp: 60 tablet, Rfl: 11     blood glucose monitoring (ONE TOUCH ULTRA) test strip, Use to test blood sugars 4 times daily as needed or as directed., Disp: 300 each, Rfl: 4     levothyroxine (SYNTHROID/LEVOTHROID) 88 MCG tablet, Take 1 tablet (88 mcg) by mouth daily (Patient taking differently: Take 88 mcg by mouth every morning ), Disp: 90 tablet, Rfl: 3     ferrous sulfate (IRON) 325 (65 FE) MG tablet, Take 1 tablet (325 mg) by mouth 2 times daily, Disp: 200 tablet, Rfl: 3     multivitamin, therapeutic with minerals (THERA-VIT-M) TABS, Take 1 tablet by mouth 2 times daily, Disp: , Rfl:      ONE TOUCH LANCETS MISC, by In Vitro route 4 times daily as needed, Disp: 100 each, Rfl: prn      Allergies:   No clinical screening - see comments; Tegaderm transparent dressing (informational only); and Latex.      Past Medical  History:  Brain tumor, oligoastrocytoma of parietal lobe  Liver failure.   Type II diabetes mellitus.   Obstructive sleep apnea  ADHD.   Financial difficulties.   Thrombocytopenia.   Partial epilepsy with impairment of consciousness.   Cirrhosis of liver.   Pulmonary nodules.   Myopic astigmatism of both eyes.   Presbyopia.   Tinnitus.   Gastrointestinal bleeding.   Portal vein thrombosis.   Chronic pain.   Hyperlipidemia.   Pancytopenia.   Hypothyroidism.   Malnutrition.   Hepatic encephalopathy.      Past Surgical History:  Colonoscopy x multiple.   Esophagoscopy, gastroscopy, duodenoscopy, x multiple.   Craniotomy, tumor excision.   Right hand surgery.   Sigmoidoscopy x multiple.     Family History:   Family history unknown, patient is adopted.       Social History:   Non-smoker. Denies alcohol use (quit in 2014). The patient lives in a town home with his wife who is his personal care attendant; patient's mother-in-law also works as his personal care attendant. He presents with his mother-in-law. The patient's wife is now working full-time for [employer].      Physical Exam:   /83 (BP Location: Right arm, Patient Position: Sitting, Cuff Size: Adult Regular)  Pulse 82  Resp 20  Wt 98.6 kg (217 lb 6.4 oz)  SpO2 97%  BMI 31.19 kg/m2   Constitutional: Alert. In no distress.  Cardiovascular: RRR. No murmurs, clicks, gallops, or rub.  Respiratory: Clear to auscultation bilaterally, no wheezes or crackles.  Psychiatric: Mentation appears normal. Normal affect.      Assessment and Plan:  Liver failure: He follows with hepatology routinely and is getting his paracenteses done at [facility] which is close to home.     Diabetes mellitus: Glucose levels have been stable with his current medication regimen. Fasting sugars have been around 100; his highest was 220 on review today.     Cough: He is currently on Levaquin prescribed by GI. The patient's wife will let me know if he is not responding to this and we  will trial a short course of prednisone. Cough may also be secondary to reflux disease.     Depression: He will continue to follow with psychology and psychiatry.     Routine Health Maintenance: Flu shot was provided today in clinic. He has an eye doctor and dentist that he is following with. Laboratory studies are up-to-date.      Follow-up: Return in about 2 months (around 3/23/2018). We will obtain an A1c at that time.         Scribe Disclosure:   I, Gee Thompson, am serving as a scribe to document services personally performed by King Louis MD at this visit, based upon the provider's statements to me. All documentation has been reviewed by the aforementioned provider prior to being entered into the official medical record.     Portions of this medical record were completed by a scribe. UPON MY REVIEW AND AUTHENTICATION BY ELECTRONIC SIGNATURE, this confirms (a) I performed the applicable clinical services, and (b) the record is accurate.   King Louis MD

## 2018-01-23 NOTE — NURSING NOTE
Chief Complaint   Patient presents with     Diabetes     follow up diabetes     CIRRHOSIS     follow up   Svetlana Granda LPN 10:44 AM on 1/23/2018

## 2018-01-23 NOTE — MR AVS SNAPSHOT
After Visit Summary   1/23/2018    Mono Varghese    MRN: [id number]           Patient Information     Date Of Birth          1970        Visit Information        Provider Department      1/23/2018 8:45 AM Devendra Almanzar MD [facility]        Today's Diagnoses     Major depressive disorder with single episode, in partial remission (H)        Primary insomnia        Mild episode of recurrent major depressive disorder (H)           Follow-ups after your visit        Follow-up notes from your care team     Return in about 3 months (around 4/23/2018).      Your next 10 appointments already scheduled     Feb 07, 2018   Procedure with Guru Jose Kelly MD   [facility] [place], Same Day Surgery (--)    [address] [address]   [phone]            Mar 07, 2018  2:20 PM CST   (Arrive by 2:05 PM)   Return Visit with Abdelrahman Narayan PA-C   [facility] ([facility])    [address]  4th Floor  [facility] [address]   [phone]            Mar 20, 2018  9:30 AM CDT   (Arrive by 9:15 AM)   Return Visit with King Louis MD   [facility] Primary Care Clinic ([facility])    [address]  4th Floor  [facility] [address] [phone]            Apr 17, 2018  9:00 AM CDT   Lab with  LAB   [facility] Lab ([facility])    [address]  1st Floor  [facility] [address]   [phone]            Apr 17, 2018  9:40 AM CDT   (Arrive by 9:25 AM)   Return General Liver with Beatriz Tanner MD   [facility] Hepatology ([facility])    [address]  [address]  [facility] [address]   [phone]            Apr 27, 2018 11:30 AM CDT   (Arrive by 11:15 AM)   MR BRAIN W/O & W CONTRAST with [address] [facility] ([facility])    [address] [place] [address]  [facility] [address] [phone]           Take your medicines as usual, unless your doctor tells you not to. Bring a list of your current medicines to your exam (including vitamins, minerals and over-the-counter drugs).  You will be given intravenous contrast for this exam. To prepare:   The day before your exam, drink extra fluids at least six 8-ounce glasses (unless your doctor tells you to restrict your fluids).   Have a blood test (creatinine test) within 30 days of your exam. Go to your clinic or Diagnostic Imaging Department for this test.  The MRI machine uses a strong magnet. Please wear clothes without metal (snaps, zippers). A sweatsuit works well, or we may give you a hospital gown.  Please remove any body piercings and hair extensions before you arrive. You will also remove watches, jewelry, hairpins, wallets, dentures, partial dental plates and hearing aids. You may wear contact lenses, and you may be able to wear your rings. We have a safe place to keep your personal items, but it is safer to leave them at home.   **IMPORTANT** THE INSTRUCTIONS BELOW ARE ONLY FOR THOSE PATIENTS WHO HAVE BEEN TOLD THEY WILL RECEIVE SEDATION OR GENERAL ANESTHESIA DURING THEIR MRI PROCEDURE:  IF YOU WILL RECEIVE SEDATION (take medicine to help you relax during your exam):   You must get the medicine from your doctor before you arrive. Bring the medicine to the exam. Do not take it at home.   Arrive one hour early. Bring someone who can take you home after the test. Your medicine will make you sleepy. After the exam, you may not drive, take a bus or take a taxi by yourself.   No eating 8 hours before your exam. You may have clear liquids up until 4 hours before your exam. (Clear liquids include water, clear tea, black coffee and fruit juice without pulp.)  IF YOU WILL RECEIVE ANESTHESIA (be asleep for your exam):   Arrive 1 1/2 hours early. Bring someone who can take you home after the test. You may not  drive, take a bus or take a taxi by yourself.   No eating 8 hours before your exam. You may have clear liquids up until 4 hours before your exam. (Clear liquids include water, clear tea, black coffee and fruit juice without pulp.)  Please call the Imaging Department at your exam site with any questions.            May 04, 2018 11:00 AM CDT   (Arrive by 10:45 AM)   Return Visit with Lauro Shaw MD   [facility] ([facility])    [address]  [address]  [facility] [address] [phone]              Who to contact     Please call your clinic at [phone] to:    Ask questions about your health    Make or cancel appointments    Discuss your medicines    Learn about your test results    Speak to your doctor   If you have compliments or concerns about an experience at your clinic, or if you wish to file a complaint, please contact [facility] Patient Relations at [phone] or email us at [email or web address]         Additional Information About Your Visit        [employer] Information     [employer] gives you secure access to your electronic health record. If you see a primary care provider, you can also send messages to your care team and make appointments. If you have questions, please call your primary care clinic.  If you do not have a primary care provider, please call [phone] and they will assist you.      [employer] is an electronic gateway that provides easy, online access to your medical records. With [employer], you can request a clinic appointment, read your test results, renew a prescription or communicate with your care team.     To access your existing account, please contact your [facility] or call [phone] for assistance.        Care EveryWhere ID     This is your Care EveryWhere ID. This could be used by other organizations to access your [place] medical records  [id number]         Your Vitals Were     Pulse BMI (Body Mass Index)                86 30.88 kg/m2           Blood Pressure from Last 3 Encounters:   01/23/18 126/83   01/23/18 147/87   01/19/18 (!) 138/92    Weight from Last 3 Encounters:   01/23/18 98.6 kg (217 lb 6.4 oz)   01/23/18 97.6 kg (215 lb 3.2 oz)   01/19/18 97.4 kg (214 lb 11.7 oz)              Today, you had the following     No orders found for display         Today's Medication Changes          These changes are accurate as of 1/23/18 11:59 PM.  If you have any questions, ask your nurse or doctor.               These medicines have changed or have updated prescriptions.        Dose/Directions    cyanocobalamin 1000 MCG tablet   Commonly known as:  vitamin  B-12   This may have changed:  when to take this   Used for:  Alcoholic cirrhosis of liver with ascites (H)        Dose:  1000 mcg   1 tablet (1,000 mcg) by Per G Tube route daily   Quantity:  130 tablet   Refills:  2       dulaglutide 1.5 MG/0.5ML pen   Commonly known as:  TRULICITY   This may have changed:  additional instructions   Used for:  DM type 2, goal HbA1c 7%-8% (H)        Dose:  1.5 mg   Inject 1.5 mg Subcutaneous every 7 days   Quantity:  6 mL   Refills:  1       glipiZIDE 10 MG 24 hr tablet   Commonly known as:  GLUCOTROL XL   This may have changed:  when to take this   Used for:  DM type 2, goal HbA1c 7%-8% (H)        Dose:  10 mg   Take 1 tablet (10 mg) by mouth daily   Quantity:  90 tablet   Refills:  0       levothyroxine 88 MCG tablet   Commonly known as:  SYNTHROID/LEVOTHROID   This may have changed:  when to take this   Used for:  Hypothyroidism        Dose:  88 mcg   Take 1 tablet (88 mcg) by mouth daily   Quantity:  90 tablet   Refills:  3       * methylphenidate 10 MG tablet   Commonly known as:  RITALIN   This may have changed:  Another medication with the same name was added. Make sure you understand how and when to take each.   Used for:  Major depressive disorder with single episode, in  partial remission (H)   Changed by:  Devendra Almanzar MD        10mg twice a day   Quantity:  60 tablet   Refills:  0       * methylphenidate 10 MG tablet   Commonly known as:  RITALIN   This may have changed:  You were already taking a medication with the same name, and this prescription was added. Make sure you understand how and when to take each.   Used for:  Major depressive disorder with single episode, in partial remission (H)   Changed by:  Devendra Almanzar MD        Dose:  10 mg   Start taking on:  2/20/2018   Take 1 tablet (10 mg) by mouth 2 times daily   Quantity:  60 tablet   Refills:  0       * methylphenidate 10 MG tablet   Commonly known as:  RITALIN   This may have changed:  You were already taking a medication with the same name, and this prescription was added. Make sure you understand how and when to take each.   Used for:  Major depressive disorder with single episode, in partial remission (H)   Changed by:  Devendra Almanzar MD        Dose:  10 mg   Start taking on:  3/20/2018   Take 1 tablet (10 mg) by mouth 2 times daily   Quantity:  60 tablet   Refills:  0       * Notice:  This list has 3 medication(s) that are the same as other medications prescribed for you. Read the directions carefully, and ask your doctor or other care provider to review them with you.         Where to get your medicines      These medications were sent to [facility] [address] [place] [address] [address] [address]     Phone:  [phone]     gabapentin 100 MG capsule    mirtazapine 45 MG tablet    traZODone 50 MG tablet         Some of these will need a paper prescription and others can be bought over the counter.  Ask your nurse if you have questions.     Bring a paper prescription for each of these medications     methylphenidate 10 MG tablet    methylphenidate 10 MG tablet    methylphenidate 10 MG tablet                Primary Care Provider Office Phone # Fax #    King IRVIN  MD Missy [phone]       [address] [address]        Equal Access to Services     SAMUEL FAIR : Hadii aad ku hadhectorrupal Giannajanna, yawxin tongemmaha, tricia anajie cason, josue dinain hayaan melindasiobhan keen laCassidycholo tamayo. So [facility] [phone].    ATENCIÓN: Si habla español, tiene a chiang disposición servicios gratuitos de asistencia lingüística. Llame al [phone].    We comply with applicable federal civil rights laws and [place] laws. We do not discriminate on the basis of race, color, national origin, age, disability, sex, sexual orientation, or gender identity.            Thank you!     Thank you for choosing [facility]  for your care. Our goal is always to provide you with excellent care. Hearing back from our patients is one way we can continue to improve our services. Please take a few minutes to complete the written survey that you may receive in the mail after your visit with us. Thank you!             Your Updated Medication List - Protect others around you: Learn how to safely use, store and throw away your medicines at www.disposemymeds.org.          This list is accurate as of 1/23/18 11:59 PM.  Always use your most recent med list.                   Brand Name Dispense Instructions for use Diagnosis    blood glucose monitoring test strip    ONETOUCH ULTRA    300 each    Use to test blood sugars 4 times daily as needed or as directed.    Diabetes mellitus, type 2 (H)       Calcium Carb-Cholecalciferol 500-400 MG-UNIT Tabs    calcium 500 +D    90 tablet    Take 500 mg by mouth daily    Malnutrition (H)       CANE, ANY MATERIAL     1 each    One cane    Balance disorder       cyanocobalamin 1000 MCG tablet    vitamin  B-12    130 tablet    1 tablet (1,000 mcg) by Per G Tube route daily    Alcoholic cirrhosis of liver with ascites (H)       dulaglutide 1.5 MG/0.5ML pen    TRULICITY    6 mL    Inject 1.5 mg Subcutaneous every 7 days    DM type 2, goal HbA1c  7%-8% (H)       ferrous sulfate 325 (65 FE) MG tablet    IRON    200 tablet    Take 1 tablet (325 mg) by mouth 2 times daily    Other iron deficiency anemia       gabapentin 100 MG capsule    NEURONTIN    30 capsule    Take 1 capsule (100 mg) by mouth At Bedtime    Mild episode of recurrent major depressive disorder (H)       glipiZIDE 10 MG 24 hr tablet    GLUCOTROL XL    90 tablet    Take 1 tablet (10 mg) by mouth daily    DM type 2, goal HbA1c 7%-8% (H)       HYDROcodone-acetaminophen 5-325 MG per tablet    NORCO    60 tablet    Take 2 tablets by mouth every 6 hours as needed for moderate to severe pain    Brain tumor (H)       hydrOXYzine 25 MG tablet    ATARAX    180 tablet    Take 1 tablet (25 mg) by mouth 2 times daily    Itching, Anxiety       lactulose 10 GM/15ML solution    CHRONULAC    473 mL    Take 7.5 mLs (5 g) by mouth 2 times daily    Hepatic encephalopathy (H)       levofloxacin 500 MG tablet    LEVAQUIN    7 tablet    Take 1 tablet (500 mg) by mouth daily    Alcoholic cirrhosis of liver without ascites (H)       levothyroxine 88 MCG tablet    SYNTHROID/LEVOTHROID    90 tablet    Take 1 tablet (88 mcg) by mouth daily    Hypothyroidism       lidocaine 4 % Crea cream    LMX4    133 g    Apply topically once as needed for mild pain    Port catheter in place       * methylphenidate 10 MG tablet    RITALIN    60 tablet    10mg twice a day    Major depressive disorder with single episode, in partial remission (H)       * methylphenidate 10 MG tablet   Start taking on:  2/20/2018    RITALIN    60 tablet    Take 1 tablet (10 mg) by mouth 2 times daily    Major depressive disorder with single episode, in partial remission (H)       * methylphenidate 10 MG tablet   Start taking on:  3/20/2018    RITALIN    60 tablet    Take 1 tablet (10 mg) by mouth 2 times daily    Major depressive disorder with single episode, in partial remission (H)       mirtazapine 45 MG tablet    REMERON    30 tablet    Take 1 tablet  (45 mg) by mouth At Bedtime    Major depressive disorder with single episode, in partial remission (H)       multivitamin, therapeutic with minerals Tabs tablet      Take 1 tablet by mouth 2 times daily        omeprazole 20 MG CR capsule    priLOSEC    360 capsule    Take 2 capsules (40 mg) by mouth 2 times daily    Gastroesophageal reflux disease without esophagitis       ONETOUCH LANCETS Misc     100 each    by In Vitro route 4 times daily as needed    Type II or unspecified type diabetes mellitus without mention of complication, not stated as uncontrolled       phenytoin 30 MG CR capsule    DILANTIN    720 capsule    Take 4 capsules (120 mg) by mouth 2 times daily    Oligoastrocytoma of parietal lobe (H)       pravastatin 80 MG tablet    PRAVACHOL    90 tablet    Take 1 tablet (80 mg) by mouth daily    High cholesterol       rifaximin 550 MG Tabs tablet    XIFAXAN    60 tablet    Take 1 tablet (550 mg) by mouth 2 times daily    Hepatic encephalopathy (H)       simethicone 80 MG chewable tablet    MYLICON    60 tablet    Take 1 tablet (80 mg) by mouth every 6 hours as needed for cramping    Abdominal cramping       thiamine 100 MG tablet     100 tablet    Take 1 tablet (100 mg) by mouth daily    Alcoholic cirrhosis of liver with ascites (H)       traZODone 50 MG tablet    DESYREL    30 tablet    Take 1 tablet (50 mg) by mouth nightly as needed for sleep    Primary insomnia       * Notice:  This list has 3 medication(s) that are the same as other medications prescribed for you. Read the directions carefully, and ask your doctor or other care provider to review them with you.

## 2018-01-25 ENCOUNTER — MYC MEDICAL ADVICE (OUTPATIENT)
Dept: FAMILY MEDICINE | Facility: CLINIC | Age: 48
End: 2018-01-25

## 2018-01-25 ENCOUNTER — DOCUMENTATION ONLY (OUTPATIENT)
Dept: CARE COORDINATION | Facility: CLINIC | Age: 48
End: 2018-01-25

## 2018-01-25 DIAGNOSIS — K65.2 SPONTANEOUS BACTERIAL PERITONITIS (H): ICD-10-CM

## 2018-01-25 NOTE — PROGRESS NOTES
[facility] utilizes an encounter to take the place of a direct phone call to your office. Please take a moment to review the below request. Your reply to this encounter will act as a verbal OK of orders if requested below. Thank you.    ORDER    Skilled Nursing 1 x week for 8 weeks with 2 prn  Monthly PORT flush when not done in clinic/hospital setting    RECERT on 1/25/18  Situation    Patient alert and oriented and cognition has been fair over this past CERT period.  Client continues to have episodes of confusion/forgetfulness. Client resides in a town house with spouse who is his PCA. Clients mother in law is also his PCA.  VSS, lungs CTA.  Patient continues to have pain to right abdomen that radiates to his back. This pain improves after a paracentesis.  Client has had two paracentesis this past CERT period and they were one week apart. Denies pain at visit today.  Education provided on weighing daily and when weight starts to rise and abdomen starts to bloat, client needs to get in for US/paracentesis.  Weight today is 213.2lbs which is up from 210lbs last CERT. Education provided that if weight gets up to 220lbs, a paracentesis is indicated. Client has been mostly compliant with daily dosing of lactulose.  He is averaging 4 bowels before noon and a total of 8 to 10 per day.  Client reports  getting  sick and  tired of the lacutlose and frequent stooling. He is also having significant blood in stool. He had an endoscopy/colonoscopy this past week and very large, leaking varices were found in the upper GI.  These were binded during procedure and client needs to return in three weeks for another endoscopy.  Client also has varices in the lower GI and large hemorrhoids. After next endoscopy, client will be meeting with the colorectal surgeon to determine POC. Spouse and Mother in Law continue to pass meds to ensure these are being taken correctly and on time.  SKin CDI, feet assessed with no concerns.   Blood sugars have have improved over this past CERT period ranging from 90 to 130.  Appetite has improved as of late and caregivers are working with patient to ensure he is eating. When client is needing a paracentesis, his appetite significantly decreases. Edu on watching appetite as an indicator of need for procedure. Client denies recent headaches.   Ambulating without assistance  however unsteady on his  feet and use of  walls and furniture. Spouse has purchased a cane over this past CERT period and edu has been provided on importance of use.  He continues to decline use. Client has remained free of falls over this past CERT period.  Home care continues to flush port monthly except when he has this done in clinic/hospital setting.   Client also cotinues to meet with his councelor and reports this is going well. Spouse and client feel he is responding well to ritalin and has been less angry since starting this medication. Client is sleeping approx 10 hours per night, but does report he feels sleep is not solid all the time.    Background Hx of type 2  diabetes without mention of complication, heptatic encephalopathy, Alcoholic cirrhosis of liver without ascites, partial epilepsy with impairment of  consciousness, major depressive disorder reccurent episode/moderate,   sleep disturbances, generalized  pain, BLE edema, venous thromboembolism with SMV and portal vein occlusion on Lovenox injections  Analysis Pt is at   risk for rehospitalization d/t high  risk medications, risk for falls, chronic   comorbidities  Recommedation SN for medication  management/education, chronic disease management/education, weight checks, compliance with BG monitoring, abdominal girth   measurements, fall assessment, home  safety  Expected length of home care is   ongoing for assess/educatin of chronic illnesses and  medication mngmt with set up.    Angela Manley RN Case Manager  [phone]  [email or web address]

## 2018-01-26 ENCOUNTER — MEDICAL CORRESPONDENCE (OUTPATIENT)
Dept: HEALTH INFORMATION MANAGEMENT | Facility: CLINIC | Age: 48
End: 2018-01-26

## 2018-01-26 RX ORDER — SULFAMETHOXAZOLE/TRIMETHOPRIM 800-160 MG
1 TABLET ORAL DAILY
Qty: 30 TABLET | Refills: 5 | COMMUNITY
Start: 2018-01-26 | End: 2018-04-12

## 2018-02-01 ENCOUNTER — TRANSFERRED RECORDS (OUTPATIENT)
Dept: HEALTH INFORMATION MANAGEMENT | Facility: CLINIC | Age: 48
End: 2018-02-01

## 2018-02-01 ENCOUNTER — DOCUMENTATION ONLY (OUTPATIENT)
Dept: CARE COORDINATION | Facility: CLINIC | Age: 48
End: 2018-02-01

## 2018-02-01 DIAGNOSIS — E11.9 DM TYPE 2, GOAL HBA1C 7%-8% (H): ICD-10-CM

## 2018-02-01 DIAGNOSIS — R10.9 ABDOMINAL CRAMPING: ICD-10-CM

## 2018-02-01 RX ORDER — SIMETHICONE 80 MG
80 TABLET,CHEWABLE ORAL EVERY 6 HOURS PRN
Qty: 60 TABLET | Refills: 1 | Status: SHIPPED | OUTPATIENT
Start: 2018-02-01 | End: 2018-04-12

## 2018-02-01 RX ORDER — GLIPIZIDE 10 MG/1
TABLET, EXTENDED RELEASE ORAL
Qty: 90 TABLET | Refills: 0 | Status: CANCELLED | OUTPATIENT
Start: 2018-02-01

## 2018-02-01 RX ORDER — GLIPIZIDE 10 MG/1
10 TABLET, FILM COATED, EXTENDED RELEASE ORAL DAILY
Qty: 90 TABLET | Refills: 1 | Status: SHIPPED | OUTPATIENT
Start: 2018-02-01 | End: 2018-01-01

## 2018-02-01 NOTE — TELEPHONE ENCOUNTER
glucotrol   Last Written Prescription Date:  11/2/17  Last Fill Quantity: 90  # refills: 0  Last Office Visit : 1/23/18  Future Office visit:  3/20/18    Routing refill request to clinic nurse for review/approval because:  Failed protocol, urine microalb needed

## 2018-02-01 NOTE — PROGRESS NOTES
Soon mi can you check with Yisel about the bloody noses, I'd say use [place] nose spray, if on any medicated nose sprays stop them, and see ENT if persisting. Ok refill DM supplies they need. Also ask her about the cough please.

## 2018-02-01 NOTE — PROGRESS NOTES
[facility] utilizes an encounter to take the place of a direct phone call to your office. Please take a moment to review the below request. Your reply to this encounter will act as a verbal OK of orders if requested below. Thank you.    Patient Update:    Client continues to cough and over the past week this has started to be slightly productive.  Dry cough also still present.  Client is reporting getting annoyed with cough. Client has finished course of antibiotics.    Client has also had bloody nose yesterday and again today.  Bleeding has been difficulty to stop, taking approx 30 mins yesterday.      Client is also requesting a new BG meter.  Current meter has been giving increasing amount of error readings. BG today is 130.  Could rx be sent for new meter and strips?     Please advice    Angela Manley RN Case Manager  [phone]  [email or web address]

## 2018-02-01 NOTE — TELEPHONE ENCOUNTER
simethicone (MYLICON) 80 MG    Last Written Prescription Date:  1/5/18  Last Fill Quantity: 60,   # refills: 1  Last Office Visit : 12/3/18  Future Office visit:  3/20/18    Routing refill request to provider for review/approval because:  Drug not on the  refill protocol or controlled substance

## 2018-02-05 ENCOUNTER — TELEPHONE (OUTPATIENT)
Dept: GASTROENTEROLOGY | Facility: CLINIC | Age: 48
End: 2018-02-05

## 2018-02-05 NOTE — TELEPHONE ENCOUNTER
Reschedule procedure for 2/7  Received: Today       Latisha Pennington Steffane       Phone Number: [phone]                     Pt's wife Yisel called stating she needs to reschedule pt's procedure that is scheduled on Wednesday, 2/7 with Dr. Kelly. Pt has his pre-op scheduled for that day.     Please call Yisel at [phone]     Thanks,   Latisha         Calling to accommodate the above request.  SR 02/05/2018 @ 6552x

## 2018-02-06 ENCOUNTER — ANESTHESIA EVENT (OUTPATIENT)
Dept: SURGERY | Facility: CLINIC | Age: 48
End: 2018-02-06
Payer: MEDICARE

## 2018-02-07 ENCOUNTER — MYC MEDICAL ADVICE (OUTPATIENT)
Dept: FAMILY MEDICINE | Facility: CLINIC | Age: 48
End: 2018-02-07

## 2018-02-07 ENCOUNTER — ANESTHESIA (OUTPATIENT)
Dept: SURGERY | Facility: CLINIC | Age: 48
End: 2018-02-07
Payer: MEDICARE

## 2018-02-07 ENCOUNTER — TELEPHONE (OUTPATIENT)
Dept: GASTROENTEROLOGY | Facility: CLINIC | Age: 48
End: 2018-02-07

## 2018-02-07 NOTE — TELEPHONE ENCOUNTER
LATE DOCUMENTATION:    Reschedule procedure for 2/7  Received: 2 days ago       Latisha Pennington Steffane       Phone Number: [phone]                     Pt's wife Yisel called stating she needs to reschedule pt's procedure that is scheduled on Wednesday, 2/7 with Dr. Kelly. Pt has his pre-op scheduled for that day.     Please call Yisel at [phone]     Thanks,   Latisha       Called yesterday (02/06/201) spoke to pt's wife and got him rescheduled with Dr. Howard on 02/12/2018.    SR 02/07/2018 @ 1045 A

## 2018-02-08 ENCOUNTER — TELEPHONE (OUTPATIENT)
Dept: SURGERY | Facility: CLINIC | Age: 48
End: 2018-02-08

## 2018-02-08 ENCOUNTER — DOCUMENTATION ONLY (OUTPATIENT)
Dept: CARE COORDINATION | Facility: CLINIC | Age: 48
End: 2018-02-08

## 2018-02-08 ASSESSMENT — ENCOUNTER SYMPTOMS
JAUNDICE: 0
POOR WOUND HEALING: 0
PANIC: 0
NECK PAIN: 0
SWOLLEN GLANDS: 0
MUSCLE CRAMPS: 0
NAIL CHANGES: 0
SKIN CHANGES: 1
SINUS CONGESTION: 0
MYALGIAS: 1
JOINT SWELLING: 0
MUSCLE WEAKNESS: 1
BLOATING: 0
NAUSEA: 0
SMELL DISTURBANCE: 0
DIARRHEA: 1
HEARTBURN: 0
NERVOUS/ANXIOUS: 0
TINGLING: 0
BLOOD IN STOOL: 1
TASTE DISTURBANCE: 0
TROUBLE SWALLOWING: 0
DECREASED CONCENTRATION: 0
SEIZURES: 0
DEPRESSION: 1
BRUISES/BLEEDS EASILY: 1
PARALYSIS: 0
LOSS OF CONSCIOUSNESS: 0
SORE THROAT: 0
ABDOMINAL PAIN: 0
WEAKNESS: 1
TREMORS: 0
VOMITING: 0
NECK MASS: 0
STIFFNESS: 0
INSOMNIA: 0
NUMBNESS: 0
SINUS PAIN: 0
HEADACHES: 0
HOARSE VOICE: 1
SPEECH CHANGE: 0
BACK PAIN: 0
RECTAL PAIN: 0
BOWEL INCONTINENCE: 0
DISTURBANCES IN COORDINATION: 1
DIZZINESS: 1
MEMORY LOSS: 0
ARTHRALGIAS: 0
CONSTIPATION: 0

## 2018-02-08 NOTE — TELEPHONE ENCOUNTER
Left message for pts wife regarding appt scheduled tomorrow at 10am with [facility]. Explained that appt is being cancelled because pt needs to see surgeon. Left contact information for her to reschedule. Idania FLORES LPN

## 2018-02-08 NOTE — PROGRESS NOTES
[facility] utilizes an encounter to take the place of a direct phone call to your office. Please take a moment to review the below request. Your reply to this encounter will act as a verbal OK of orders if requested below. Thank you.    ORDER    PT to eval and treat cane use/education and falls risk assessment.    Angela Manley RN Case Manager  [phone]  [email or web address]

## 2018-02-09 ENCOUNTER — OFFICE VISIT (OUTPATIENT)
Dept: FAMILY MEDICINE | Facility: CLINIC | Age: 48
End: 2018-02-09
Payer: MEDICARE

## 2018-02-09 VITALS
RESPIRATION RATE: 20 BRPM | HEART RATE: 85 BPM | DIASTOLIC BLOOD PRESSURE: 78 MMHG | OXYGEN SATURATION: 95 % | BODY MASS INDEX: 29.82 KG/M2 | SYSTOLIC BLOOD PRESSURE: 121 MMHG | WEIGHT: 207.8 LBS

## 2018-02-09 DIAGNOSIS — K70.31 ALCOHOLIC CIRRHOSIS OF LIVER WITH ASCITES (H): ICD-10-CM

## 2018-02-09 DIAGNOSIS — E11.9 DM TYPE 2, GOAL HBA1C 7%-8% (H): Primary | ICD-10-CM

## 2018-02-09 LAB
ALBUMIN SERPL-MCNC: 3.4 G/DL (ref 3.4–5)
ALP SERPL-CCNC: 262 U/L (ref 40–150)
ALT SERPL W P-5'-P-CCNC: 33 U/L (ref 0–70)
ANION GAP SERPL CALCULATED.3IONS-SCNC: 5 MMOL/L (ref 3–14)
AST SERPL W P-5'-P-CCNC: 56 U/L (ref 0–45)
BILIRUB SERPL-MCNC: 1.2 MG/DL (ref 0.2–1.3)
BUN SERPL-MCNC: 7 MG/DL (ref 7–30)
CALCIUM SERPL-MCNC: 8.3 MG/DL (ref 8.5–10.1)
CHLORIDE SERPL-SCNC: 107 MMOL/L (ref 94–109)
CO2 SERPL-SCNC: 28 MMOL/L (ref 20–32)
CREAT SERPL-MCNC: 0.99 MG/DL (ref 0.66–1.25)
ERYTHROCYTE [DISTWIDTH] IN BLOOD BY AUTOMATED COUNT: 14.5 % (ref 10–15)
GFR SERPL CREATININE-BSD FRML MDRD: 80 ML/MIN/1.7M2
GLUCOSE SERPL-MCNC: 106 MG/DL (ref 70–99)
HCT VFR BLD AUTO: 37.6 % (ref 40–53)
HGB BLD-MCNC: 12.8 G/DL (ref 13.3–17.7)
INR PPP: 1.46 (ref 0.86–1.14)
MCH RBC QN AUTO: 33.8 PG (ref 26.5–33)
MCHC RBC AUTO-ENTMCNC: 34 G/DL (ref 31.5–36.5)
MCV RBC AUTO: 99 FL (ref 78–100)
PLATELET # BLD AUTO: 45 10E9/L (ref 150–450)
POTASSIUM SERPL-SCNC: 4.2 MMOL/L (ref 3.4–5.3)
PROT SERPL-MCNC: 7.1 G/DL (ref 6.8–8.8)
RBC # BLD AUTO: 3.79 10E12/L (ref 4.4–5.9)
SODIUM SERPL-SCNC: 139 MMOL/L (ref 133–144)
WBC # BLD AUTO: 1.8 10E9/L (ref 4–11)

## 2018-02-09 ASSESSMENT — PAIN SCALES - GENERAL: PAINLEVEL: NO PAIN (0)

## 2018-02-09 NOTE — PATIENT INSTRUCTIONS
[facility]: [phone]     [facility] Medication Refill Request Information:  * Please contact your pharmacy regarding ANY request for medication refills.  ** [facility] Prescription Fax = [phone]  * Please allow 3 business days for routine medication refills.  * Please allow 5 business days for controlled substance medication refills.     [facility] Test Result notification information:  *You will be notified with in 7-10 days of your appointment day regarding the results of your test.  If you are on MyChart you will be notified as soon as the provider has reviewed the results and signed off on them.

## 2018-02-09 NOTE — PROGRESS NOTES
[facility]   King Louis MD  02/09/2018     Chief Complaint:   Pre-Op Exam (EGD-2/7/18 (under anesthesia) Varicies)     History of Present Illness:   Mono Varghese is a 47 year old male with a history of gastrointestinal bleeding who presents with his mother in law for preoperative evaluation prior to sophagoscopy, gastroscopy, duodenoscopy. The patient has no acute GI symptoms. He has frequent nosebleeds but has not otherwise had problems with bleeding and has not had bad reaction to anesthesia in the past. Family history is unknown, as he is adopted.    Other concerns discussed:  The patient has concerns over a mole on his left upper arm, right lower leg, and left ankle mole. He is also worried that there are tremors in his right foot when off the ground and swelling in left ankle.     Review of Systems:   Answers for HPI/ROS submitted by the patient on 2/8/2018   General Symptoms: No  Skin Symptoms: Yes  HENT Symptoms: Yes  EYE SYMPTOMS: No  HEART SYMPTOMS: No  LUNG SYMPTOMS: No  INTESTINAL SYMPTOMS: Yes  URINARY SYMPTOMS: No  REPRODUCTIVE SYMPTOMS: No  SKELETAL SYMPTOMS: Yes  BLOOD SYMPTOMS: Yes  NERVOUS SYSTEM SYMPTOMS: Yes  MENTAL HEALTH SYMPTOMS: Yes  Changes in hair: No  Changes in moles/birth marks: Yes  Itching: No  Rashes: No  Changes in nails: No  Acne: No  Change in facial hair: No  Warts: No  Non-healing sores: No  Scarring: No  Flaking of skin: Yes  Color changes of hands/feet in cold : No  Sun sensitivity: No  Skin thickening: No  Ear pain: No  Ear discharge: No  Hearing loss: No  Tinnitus: Yes  Nosebleeds: Yes  Congestion: No  Sinus pain: No  Trouble swallowing: No   Voice hoarseness: Yes  Mouth sores: No  Sore throat: No  Tooth pain: No  Gum tenderness: No  Bleeding gums: No  Change in taste: No  Change in sense of smell: No  Dry mouth: Yes  Hearing aid used: No  Neck lump: No  Heart burn or indigestion: No  Nausea: No  Vomiting: No  Abdominal pain: No  Bloating:  No  Constipation: No  Diarrhea: Yes  Blood in stool: Yes  Black stools: Yes  Rectal or Anal pain: No  Fecal incontinence: No  Yellowing of skin or eyes: No  Vomit with blood: No  Change in stools: No  Back pain: No  Muscle aches: Yes  Neck pain: No  Swollen joints: No  Joint pain: No  Bone pain: No  Muscle cramps: No  Muscle weakness: Yes  Joint stiffness: No  Bone fracture: No  Anemia: No  Swollen glands: No  Easy bleeding or bruising: Yes  Edema or swelling: No  Trouble with coordination: Yes  Dizziness or trouble with balance: Yes  Fainting or black-out spells: No  Memory loss: No  Headache: No  Seizures: No  Speech problems: No  Tingling: No  Tremor: No  Weakness: Yes  Difficulty walking: Yes  Paralysis: No  Numbness: No  Nervous or Anxious: No  Depression: Yes  Trouble sleeping: No  Trouble thinking or concentrating: No  Mood changes: No  Panic attacks: No      Active Medications:     Current Outpatient Prescriptions:      simethicone (MYLICON) 80 MG chewable tablet, Take 1 tablet (80 mg) by mouth every 6 hours as needed for cramping, Disp: 60 tablet, Rfl: 1     glipiZIDE (GLUCOTROL XL) 10 MG 24 hr tablet, Take 1 tablet (10 mg) by mouth daily, Disp: 90 tablet, Rfl: 1     sulfamethoxazole-trimethoprim (BACTRIM DS/SEPTRA DS) 800-160 MG per tablet, Take 1 tablet by mouth daily, Disp: 30 tablet, Rfl: 5     methylphenidate (RITALIN) 10 MG tablet, 10mg twice a day, Disp: 60 tablet, Rfl: 0     [START ON 2/20/2018] methylphenidate (RITALIN) 10 MG tablet, Take 1 tablet (10 mg) by mouth 2 times daily, Disp: 60 tablet, Rfl: 0     [START ON 3/20/2018] methylphenidate (RITALIN) 10 MG tablet, Take 1 tablet (10 mg) by mouth 2 times daily, Disp: 60 tablet, Rfl: 0     mirtazapine (REMERON) 45 MG tablet, Take 1 tablet (45 mg) by mouth At Bedtime, Disp: 30 tablet, Rfl: 3     traZODone (DESYREL) 50 MG tablet, Take 1 tablet (50 mg) by mouth nightly as needed for sleep, Disp: 30 tablet, Rfl: 3     gabapentin (NEURONTIN) 100 MG  capsule, Take 1 capsule (100 mg) by mouth At Bedtime, Disp: 30 capsule, Rfl: 3     pravastatin (PRAVACHOL) 80 MG tablet, Take 1 tablet (80 mg) by mouth daily, Disp: 90 tablet, Rfl: 3     Calcium Carb-Cholecalciferol (CALCIUM 500 +D) 500-400 MG-UNIT TABS, Take 500 mg by mouth daily, Disp: 90 tablet, Rfl: 1     thiamine (VITAMIN B-1) 100 MG tablet, Take 1 tablet (100 mg) by mouth daily, Disp: 100 tablet, Rfl: 1     lidocaine (LMX4) 4 % CREA cream, Apply topically once as needed for mild pain, Disp: 133 g, Rfl: 1     CANE, ANY MATERIAL, One cane, Disp: 1 each, Rfl: 0     HYDROcodone-acetaminophen (NORCO) 5-325 MG per tablet, Take 2 tablets by mouth every 6 hours as needed for moderate to severe pain, Disp: 60 tablet, Rfl: 0     phenytoin (DILANTIN) 30 MG CR capsule, Take 4 capsules (120 mg) by mouth 2 times daily, Disp: 720 capsule, Rfl: 1     omeprazole (PRILOSEC) 20 MG CR capsule, Take 2 capsules (40 mg) by mouth 2 times daily, Disp: 360 capsule, Rfl: 3     dulaglutide (TRULICITY) 1.5 MG/0.5ML pen, Inject 1.5 mg Subcutaneous every 7 days (Patient taking differently: Inject 1.5 mg Subcutaneous every 7 days Tuesday), Disp: 6 mL, Rfl: 1     hydrOXYzine (ATARAX) 25 MG tablet, Take 1 tablet (25 mg) by mouth 2 times daily, Disp: 180 tablet, Rfl: 1     cyanocobalamin (VITAMIN  B-12) 1000 MCG tablet, 1 tablet (1,000 mcg) by Per G Tube route daily (Patient taking differently: 1,000 mcg by Per G Tube route every morning ), Disp: 130 tablet, Rfl: 2     lactulose (CHRONULAC) 10 GM/15ML solution, Take 7.5 mLs (5 g) by mouth 2 times daily, Disp: 473 mL, Rfl: 1     rifaximin (XIFAXAN) 550 MG TABS tablet, Take 1 tablet (550 mg) by mouth 2 times daily, Disp: 60 tablet, Rfl: 11     blood glucose monitoring (ONE TOUCH ULTRA) test strip, Use to test blood sugars 4 times daily as needed or as directed., Disp: 300 each, Rfl: 4     levothyroxine (SYNTHROID/LEVOTHROID) 88 MCG tablet, Take 1 tablet (88 mcg) by mouth daily (Patient taking  differently: Take 88 mcg by mouth every morning ), Disp: 90 tablet, Rfl: 3     ferrous sulfate (IRON) 325 (65 FE) MG tablet, Take 1 tablet (325 mg) by mouth 2 times daily, Disp: 200 tablet, Rfl: 3     multivitamin, therapeutic with minerals (THERA-VIT-M) TABS, Take 1 tablet by mouth 2 times daily, Disp: , Rfl:      ONE TOUCH LANCETS MISC, by In Vitro route 4 times daily as needed, Disp: 100 each, Rfl: prn      Allergies:   No clinical screening - see comments; Tegaderm transparent dressing (informational only); and Latex      Past Medical History:  Brain tumor  Liver failure  Type 2 diabetes mellitus   Moderate major depression   LENA (obstructive sleep apnea)-moderate  Oligoastrocytoma of parietal lobe  ADHD (attention deficit hyperactivity disorder)  Financial difficulties  Thrombocytopenia  Partial epilepsy with impairment of consciousness   Cirrhosis of liver  Pulmonary nodules  Myopic astigmatism of both eyes  Presbyopia  Ringing in ears, unspecified laterality  GIB (gastrointestinal bleeding)  Portal vein thrombosis  Advance care planning  Chronic pain  Hyperlipidemia LDL goal <100  Pancytopenia   Hypothyroidism  Malnutrition   Hx of psychiatric care  Encephalopathy, hepatic      Past Surgical History:  COLONOSCOPY (11/27/2015)  ESOPHAGOSCOPY, GASTROSCOPY, DUODENOSCOPY (EGD), COMBINED (11/23/2015)  ESOPHAGOSCOPY, GASTROSCOPY, DUODENOSCOPY (EGD), COMBINED (1/25/2017)  ESOPHAGOSCOPY, GASTROSCOPY, DUODENOSCOPY (EGD), COMBINED (3/30/2017)  ESOPHAGOSCOPY, GASTROSCOPY, DUODENOSCOPY (EGD), COMBINED (1/19/2018)  OPTICAL TRACKING SYSTEM CRANIOTOMY, EXCISE TUMOR, COMBINED (6/6/2013)  ORTHOPEDIC SURGERY, hand surgery- right  SIGMOIDOSCOPY FLEXIBLE (11/24/2015)  SIGMOIDOSCOPY FLEXIBLE (1/19/2018)     Physical Exam:   /78 (BP Location: Right arm, Patient Position: Sitting, Cuff Size: Adult Regular)  Pulse 85  Resp 20  Wt 94.3 kg (207 lb 12.8 oz)  SpO2 95%  BMI 29.82 kg/m2        Constitutional: Alert. In no  distress.  Head: Normocephalic. No masses, lesions, tenderness or abnormalities.  ENT: No neck nodes or sinus tenderness.  Cardiovascular: RRR. No murmurs, clicks, gallops, or rub.  Respiratory: Clear to auscultation bilaterally, no wheezes or crackles.  Gastrointestinal: Abdomen soft. Non-tender. BS normal. No masses or organomegaly. Distended.  Musculoskeletal: Trace pedal edema bilaterally.No gross deformities noted. Normal muscle tone.  Skin: On left upper arm round is a slightly raised brownish-blackish lesion with a rough surface. On his right lower leg there is a 1.0 cm firm raised, smooth, purple lesion. On his feet bilaterally there are scattered 1-2 mm light brown spots.  Neurologic: Gait slow. Reflexes normal and symmetric. Sensation grossly WNL.  Psychiatric: Mentation-slow response, which is his baseline. Normal affect.   Hematologic/Lymphatic/Immunologic: Normal cervical lymph nodes.     Recent Labs:  In EPIC    EKG:  EKG was indicated based on risk assessment.  no acute changes     Assessment and Plan:  DM type 2, goal HbA1c 7%-8% (H)  - EKG Performed in Clinic w/ Provider Reading Fee  - ORTHOPEDICS ADULT REFERRAL    Alcoholic cirrhosis of liver with ascites (H)  - CBC with platelets  - Comprehensive metabolic panel  - INR  - EKG Performed in Clinic w/ Provider Reading Fee    The patient was instructed to hold glucose lowering meds the day of surgery.    1. Pre-operative assessment for sophagoscopy, gastroscopy, duodenoscopy.  The patient is at LOW risk for cardiovascular complications and at LOW risk for pulmonary complications of this LOW risk surgery. Of note, he runs low platelets and mildly high INR due to liver disease.        The patient has been told to not take any aspirin or NSAIDs for 10 days prior to surgery. The patient has been instructed as to what to do with medications prior to surgery.    Follow-up: Return to clinic in as scheduled.         Scribe Disclosure:   I, Oli Navarro, am  serving as a scribe to document services personally performed by King Louis MD at this visit, based upon the provider's statements to me. All documentation has been reviewed by the aforementioned provider prior to being entered into the official medical record.     Portions of this medical record were completed by a scribe. UPON MY REVIEW AND AUTHENTICATION BY ELECTRONIC SIGNATURE, this confirms (a) I performed the applicable clinical services, and (b) the record is accurate.    King Louis MD

## 2018-02-09 NOTE — MR AVS SNAPSHOT
After Visit Summary   2/9/2018    Mono Varghese    MRN: [id number]           Patient Information     Date Of Birth          1970        Visit Information        Provider Department      2/9/2018 10:35 AM King Louis MD [facility]        Today's Diagnoses     DM type 2, goal HbA1c 7%-8% (H)    -  1    Alcoholic cirrhosis of liver with ascites (H)          Care Instructions    Primary Care Center: [phone]     Primary Care Center Medication Refill Request Information:  * Please contact your pharmacy regarding ANY request for medication refills.  ** [facility] Prescription Fax = [phone]  * Please allow 3 business days for routine medication refills.  * Please allow 5 business days for controlled substance medication refills.     Primary Care Center Test Result notification information:  *You will be notified with in 7-10 days of your appointment day regarding the results of your test.  If you are on MyChart you will be notified as soon as the provider has reviewed the results and signed off on them.          Follow-ups after your visit        Additional Services     ORTHOPEDICS ADULT REFERRAL       Your provider has referred you to: [facility]: Orthopaedic Clinic [facility] [phone]   [email or web address]      Dr Madden    Please be aware that coverage of these services is subject to the terms and limitations of your health insurance plan.  Call member services at your health plan with any benefit or coverage questions.      Please bring the following to your appointment:    >>   Any x-rays, CTs or MRIs which have been performed.  Contact the facility where they were done to arrange for  prior to your scheduled appointment.    >>   List of current medications   >>   This referral request   >>   Any documents/labs given to you for this referral                  Your next 10 appointments already scheduled     Feb 12, 2018    Procedure with Guru Jose Kelly MD   [facility], [place], Same Day Surgery (--)    [address]  [facility] [address]   [phone]            Feb 19, 2018  9:00 AM CST   New Visit with Knig Quiles DPM   [facility] ([facility])    [address] [address] [place] [address]   [phone]            Mar 07, 2018  2:20 PM CST   (Arrive by 2:05 PM)   Return Visit with Abdelrahman Narayan PA-C   [facility] ([facility])    [address] [facility] [address]  [address] [facility] [address]   [phone]            Mar 20, 2018  9:30 AM CDT   (Arrive by 9:15 AM)   Return Visit with King Louis MD   [facility] Primary [facility] ([facility])    [address]  [address] [facility] [address]   [phone]            Apr 17, 2018  9:00 AM CDT   Lab with  LAB   [facility] Lab ([facility])    [address]  [address] [facility] [address]   [phone]            Apr 17, 2018  9:40 AM CDT   (Arrive by 9:25 AM)   Return General Liver with Beatriz Tanner MD   [facility] Hepatology ([facility])    [address] [place] [address]  [address]  [facility] [address]   [phone]            Apr 27, 2018 11:30 AM CDT   (Arrive by 11:15 AM)   MR BRAIN W/O & W CONTRAST with [address] [facility] [address] [facility] ([facility])    [address]  [address] [facility] [address] [phone]           Take your medicines as usual, unless your doctor tells you not to. Bring a list of your current medicines to your exam (including vitamins, minerals and over-the-counter drugs).  You may or may not receive intravenous (IV) contrast for this exam pending the discretion of the Radiologist.  You do not need to do anything special to prepare.  The MRI machine uses a strong magnet. Please wear  clothes without metal (snaps, zippers). A sweatsuit works well, or we may give you a hospital gown.  Please remove any body piercings and hair extensions before you arrive. You will also remove watches, jewelry, hairpins, wallets, dentures, partial dental plates and hearing aids. You may wear contact lenses, and you may be able to wear your rings. We have a safe place to keep your personal items, but it is safer to leave them at home.  **IMPORTANT** THE INSTRUCTIONS BELOW ARE ONLY FOR THOSE PATIENTS WHO HAVE BEEN PRESCRIBED SEDATION OR GENERAL ANESTHESIA DURING THEIR MRI PROCEDURE:  IF YOUR DOCTOR PRESCRIBED ORAL SEDATION (take medicine to help you relax during your exam):   You must get the medicine from your doctor (oral medication) before you arrive. Bring the medicine to the exam. Do not take it at home. You ll be told when to take it upon arriving for your exam.   Arrive one hour early. Bring someone who can take you home after the test. Your medicine will make you sleepy. After the exam, you may not drive, take a bus or take a taxi by yourself.  IF YOUR DOCTOR PRESCRIBED IV SEDATION:   Arrive one hour early. Bring someone who can take you home after the test. Your medicine will make you sleepy. After the exam, you may not drive, take a bus or take a taxi by yourself.   No eating 6 hours before your exam. You may have clear liquids up until 4 hours before your exam. (Clear liquids include water, clear tea, black coffee and fruit juice without pulp.)  IF YOUR DOCTOR PRESCRIBED ANESTHESIA (be asleep for your exam):   Arrive 1 1/2 hours early. Bring someone who can take you home after the test. You may not drive, take a bus or take a taxi by yourself.   No eating 8 hours before your exam. You may have clear liquids up until 4 hours before your exam. (Clear liquids include water, clear tea, black coffee and fruit juice without pulp.)   You will spend four to five hours in the recovery room.  Please call the Imaging  Department at your exam site with any questions.            May 04, 2018 11:00 AM CDT   (Arrive by 10:45 AM)   Return Visit with Lauro Shaw MD   [facility] ([facility])    [address]  [address]  [facility] [address] [phone]              Future tests that were ordered for you today     Open Future Orders        Priority Expected Expires Ordered    CBC with platelets Routine 2/9/2018 2/23/2018 2/9/2018    Comprehensive metabolic panel Routine 2/9/2018 2/23/2018 2/9/2018    INR Routine 2/9/2018 2/9/2019 2/9/2018            Who to contact     Please call your clinic at [phone] to:    Ask questions about your health    Make or cancel appointments    Discuss your medicines    Learn about your test results    Speak to your doctor            Additional Information About Your Visit        [employer] Social Information     [employer] gives you secure access to your electronic health record. If you see a primary care provider, you can also send messages to your care team and make appointments. If you have questions, please call your primary care clinic.  If you do not have a primary care provider, please call [phone] and they will assist you.      [employer] is an electronic gateway that provides easy, online access to your medical records. With [employer], you can request a clinic appointment, read your test results, renew a prescription or communicate with your care team.     To access your existing account, please contact your [facility] or call [phone] for assistance.        Care EveryWhere ID     This is your Care EveryWhere ID. This could be used by other organizations to access your [place] medical records  [id number]        Your Vitals Were     Pulse Respirations Pulse Oximetry BMI (Body Mass Index)          85 20 95% 29.82 kg/m2         Blood Pressure from Last 3 Encounters:   02/09/18 121/78   01/23/18 126/83   01/19/18  (!) 138/92    Weight from Last 3 Encounters:   02/09/18 94.3 kg (207 lb 12.8 oz)   01/23/18 98.6 kg (217 lb 6.4 oz)   01/19/18 97.4 kg (214 lb 11.7 oz)              We Performed the Following     EKG Performed in Clinic w/ Provider Reading Fee     ORTHOPEDICS ADULT REFERRAL          Today's Medication Changes          These changes are accurate as of 2/9/18 11:45 AM.  If you have any questions, ask your nurse or doctor.               These medicines have changed or have updated prescriptions.        Dose/Directions    cyanocobalamin 1000 MCG tablet   Commonly known as:  vitamin  B-12   This may have changed:  when to take this   Used for:  Alcoholic cirrhosis of liver with ascites (H)        Dose:  1000 mcg   1 tablet (1,000 mcg) by Per G Tube route daily   Quantity:  130 tablet   Refills:  2       dulaglutide 1.5 MG/0.5ML pen   Commonly known as:  TRULICITY   This may have changed:  additional instructions   Used for:  DM type 2, goal HbA1c 7%-8% (H)        Dose:  1.5 mg   Inject 1.5 mg Subcutaneous every 7 days   Quantity:  6 mL   Refills:  1       levothyroxine 88 MCG tablet   Commonly known as:  SYNTHROID/LEVOTHROID   This may have changed:  when to take this   Used for:  Hypothyroidism        Dose:  88 mcg   Take 1 tablet (88 mcg) by mouth daily   Quantity:  90 tablet   Refills:  3                Primary Care Provider Office Phone # Fax #    King Louis [phone]       [address] [facility] [address]  [facility] [address]        Equal Access to Services     SAMUEL FAIR AH: Hadii kevin ku hadasho Soomaali, waaxda luqadaha, qaybta kaalmada adeegyada, josue tamayo. So [facility] [phone].    ATENCIÓN: Si habla rangelañol, tiene a chiang disposición servicios gratuitos de asistencia lingüística. Llame al [phone].    We comply with applicable federal civil rights laws and [place] laws. We do not discriminate on the basis of race, color, national origin, age, disability, sex,  sexual orientation, or gender identity.            Thank you!     Thank you for choosing [facility]IMARY [facility]IC  for your care. Our goal is always to provide you with excellent care. Hearing back from our patients is one way we can continue to improve our services. Please take a few minutes to complete the written survey that you may receive in the mail after your visit with us. Thank you!             Your Updated Medication List - Protect others around you: Learn how to safely use, store and throw away your medicines at www.disposemymeds.org.          This list is accurate as of 2/9/18 11:45 AM.  Always use your most recent med list.                   Brand Name Dispense Instructions for use Diagnosis    blood glucose monitoring test strip    ONETOUCH ULTRA    300 each    Use to test blood sugars 4 times daily as needed or as directed.    Diabetes mellitus, type 2 (H)       Calcium Carb-Cholecalciferol 500-400 MG-UNIT Tabs    calcium 500 +D    90 tablet    Take 500 mg by mouth daily    Malnutrition (H)       CANE, ANY MATERIAL     1 each    One cane    Balance disorder       cyanocobalamin 1000 MCG tablet    vitamin  B-12    130 tablet    1 tablet (1,000 mcg) by Per G Tube route daily    Alcoholic cirrhosis of liver with ascites (H)       dulaglutide 1.5 MG/0.5ML pen    TRULICITY    6 mL    Inject 1.5 mg Subcutaneous every 7 days    DM type 2, goal HbA1c 7%-8% (H)       ferrous sulfate 325 (65 FE) MG tablet    IRON    200 tablet    Take 1 tablet (325 mg) by mouth 2 times daily    Other iron deficiency anemia       gabapentin 100 MG capsule    NEURONTIN    30 capsule    Take 1 capsule (100 mg) by mouth At Bedtime    Mild episode of recurrent major depressive disorder (H)       glipiZIDE 10 MG 24 hr tablet    GLUCOTROL XL    90 tablet    Take 1 tablet (10 mg) by mouth daily    DM type 2, goal HbA1c 7%-8% (H)       HYDROcodone-acetaminophen 5-325 MG per tablet    NORCO    60 tablet    Take 2 tablets by mouth  every 6 hours as needed for moderate to severe pain    Brain tumor (H)       hydrOXYzine 25 MG tablet    ATARAX    180 tablet    Take 1 tablet (25 mg) by mouth 2 times daily    Itching, Anxiety       lactulose 10 GM/15ML solution    CHRONULAC    473 mL    Take 7.5 mLs (5 g) by mouth 2 times daily    Hepatic encephalopathy (H)       levothyroxine 88 MCG tablet    SYNTHROID/LEVOTHROID    90 tablet    Take 1 tablet (88 mcg) by mouth daily    Hypothyroidism       lidocaine 4 % Crea cream    LMX4    133 g    Apply topically once as needed for mild pain    Port catheter in place       * methylphenidate 10 MG tablet    RITALIN    60 tablet    10mg twice a day    Major depressive disorder with single episode, in partial remission (H)       * methylphenidate 10 MG tablet   Start taking on:  2/20/2018    RITALIN    60 tablet    Take 1 tablet (10 mg) by mouth 2 times daily    Major depressive disorder with single episode, in partial remission (H)       * methylphenidate 10 MG tablet   Start taking on:  3/20/2018    RITALIN    60 tablet    Take 1 tablet (10 mg) by mouth 2 times daily    Major depressive disorder with single episode, in partial remission (H)       mirtazapine 45 MG tablet    REMERON    30 tablet    Take 1 tablet (45 mg) by mouth At Bedtime    Major depressive disorder with single episode, in partial remission (H)       multivitamin, therapeutic with minerals Tabs tablet      Take 1 tablet by mouth 2 times daily        omeprazole 20 MG CR capsule    priLOSEC    360 capsule    Take 2 capsules (40 mg) by mouth 2 times daily    Gastroesophageal reflux disease without esophagitis       ONETOUCH LANCETS Misc     100 each    by In Vitro route 4 times daily as needed    Type II or unspecified type diabetes mellitus without mention of complication, not stated as uncontrolled       phenytoin 30 MG CR capsule    DILANTIN    720 capsule    Take 4 capsules (120 mg) by mouth 2 times daily    Oligoastrocytoma of parietal lobe  (H)       pravastatin 80 MG tablet    PRAVACHOL    90 tablet    Take 1 tablet (80 mg) by mouth daily    High cholesterol       rifaximin 550 MG Tabs tablet    XIFAXAN    60 tablet    Take 1 tablet (550 mg) by mouth 2 times daily    Hepatic encephalopathy (H)       simethicone 80 MG chewable tablet    MYLICON    60 tablet    Take 1 tablet (80 mg) by mouth every 6 hours as needed for cramping    Abdominal cramping       sulfamethoxazole-trimethoprim 800-160 MG per tablet    BACTRIM DS/SEPTRA DS    30 tablet    Take 1 tablet by mouth daily    Spontaneous bacterial peritonitis (H)       thiamine 100 MG tablet     100 tablet    Take 1 tablet (100 mg) by mouth daily    Alcoholic cirrhosis of liver with ascites (H)       traZODone 50 MG tablet    DESYREL    30 tablet    Take 1 tablet (50 mg) by mouth nightly as needed for sleep    Primary insomnia       * Notice:  This list has 3 medication(s) that are the same as other medications prescribed for you. Read the directions carefully, and ask your doctor or other care provider to review them with you.

## 2018-02-10 LAB — INTERPRETATION ECG - MUSE: NORMAL

## 2018-02-12 ENCOUNTER — DOCUMENTATION ONLY (OUTPATIENT)
Dept: CARE COORDINATION | Facility: CLINIC | Age: 48
End: 2018-02-12

## 2018-02-12 ENCOUNTER — SURGERY (OUTPATIENT)
Age: 48
End: 2018-02-12

## 2018-02-12 ENCOUNTER — HOSPITAL ENCOUNTER (OUTPATIENT)
Facility: CLINIC | Age: 48
Discharge: HOME | End: 2018-02-12
Attending: INTERNAL MEDICINE | Admitting: INTERNAL MEDICINE
Payer: MEDICARE

## 2018-02-12 VITALS
DIASTOLIC BLOOD PRESSURE: 94 MMHG | RESPIRATION RATE: 16 BRPM | TEMPERATURE: 97.6 F | BODY MASS INDEX: 30.52 KG/M2 | HEART RATE: 79 BPM | OXYGEN SATURATION: 100 % | WEIGHT: 213.19 LBS | HEIGHT: 70 IN | SYSTOLIC BLOOD PRESSURE: 141 MMHG

## 2018-02-12 LAB
ABO + RH BLD: NORMAL
ABO + RH BLD: NORMAL
BLD GP AB SCN SERPL QL: NORMAL
BLOOD BANK CMNT PATIENT-IMP: NORMAL
ERYTHROCYTE [DISTWIDTH] IN BLOOD BY AUTOMATED COUNT: 14.8 % (ref 10–15)
GLUCOSE BLDC GLUCOMTR-MCNC: 115 MG/DL (ref 70–99)
GLUCOSE BLDC GLUCOMTR-MCNC: 130 MG/DL (ref 70–99)
GLUCOSE BLDC GLUCOMTR-MCNC: 79 MG/DL (ref 70–99)
HCT VFR BLD AUTO: 35.8 % (ref 40–53)
HGB BLD-MCNC: 12 G/DL (ref 13.3–17.7)
INR PPP: 1.56 (ref 0.86–1.14)
MCH RBC QN AUTO: 32.6 PG (ref 26.5–33)
MCHC RBC AUTO-ENTMCNC: 33.5 G/DL (ref 31.5–36.5)
MCV RBC AUTO: 97 FL (ref 78–100)
PLATELET # BLD AUTO: 43 10E9/L (ref 150–450)
RBC # BLD AUTO: 3.68 10E12/L (ref 4.4–5.9)
SPECIMEN EXP DATE BLD: NORMAL
UPPER GI ENDOSCOPY: NORMAL
WBC # BLD AUTO: 1.9 10E9/L (ref 4–11)

## 2018-02-12 PROCEDURE — 86901 BLOOD TYPING SEROLOGIC RH(D): CPT | Performed by: INTERNAL MEDICINE

## 2018-02-12 PROCEDURE — 40000171 ZZH STATISTIC PRE-PROCEDURE ASSESSMENT III: Performed by: INTERNAL MEDICINE

## 2018-02-12 PROCEDURE — 36000051 ZZH SURGERY LEVEL 2 1ST 30 MIN - UMMC: Performed by: INTERNAL MEDICINE

## 2018-02-12 PROCEDURE — 85027 COMPLETE CBC AUTOMATED: CPT | Performed by: INTERNAL MEDICINE

## 2018-02-12 PROCEDURE — 27210995 ZZH RX 272: Performed by: INTERNAL MEDICINE

## 2018-02-12 PROCEDURE — 27210794 ZZH OR GENERAL SUPPLY STERILE: Performed by: INTERNAL MEDICINE

## 2018-02-12 PROCEDURE — 71000027 ZZH RECOVERY PHASE 2 EACH 15 MINS: Performed by: INTERNAL MEDICINE

## 2018-02-12 PROCEDURE — 86900 BLOOD TYPING SEROLOGIC ABO: CPT | Performed by: INTERNAL MEDICINE

## 2018-02-12 PROCEDURE — 37000008 ZZH ANESTHESIA TECHNICAL FEE, 1ST 30 MIN: Performed by: INTERNAL MEDICINE

## 2018-02-12 PROCEDURE — 25000128 H RX IP 250 OP 636: Performed by: ANESTHESIOLOGY

## 2018-02-12 PROCEDURE — C9399 UNCLASSIFIED DRUGS OR BIOLOG: HCPCS | Performed by: NURSE ANESTHETIST, CERTIFIED REGISTERED

## 2018-02-12 PROCEDURE — 86850 RBC ANTIBODY SCREEN: CPT | Performed by: INTERNAL MEDICINE

## 2018-02-12 PROCEDURE — 25000566 ZZH SEVOFLURANE, EA 15 MIN: Performed by: INTERNAL MEDICINE

## 2018-02-12 PROCEDURE — 25000125 ZZHC RX 250: Performed by: NURSE ANESTHETIST, CERTIFIED REGISTERED

## 2018-02-12 PROCEDURE — 25000128 H RX IP 250 OP 636: Performed by: NURSE ANESTHETIST, CERTIFIED REGISTERED

## 2018-02-12 PROCEDURE — 82962 GLUCOSE BLOOD TEST: CPT

## 2018-02-12 PROCEDURE — 71000014 ZZH RECOVERY PHASE 1 LEVEL 2 FIRST HR: Performed by: INTERNAL MEDICINE

## 2018-02-12 PROCEDURE — A9270 NON-COVERED ITEM OR SERVICE: HCPCS | Performed by: INTERNAL MEDICINE

## 2018-02-12 PROCEDURE — 37000009 ZZH ANESTHESIA TECHNICAL FEE, EACH ADDTL 15 MIN: Performed by: INTERNAL MEDICINE

## 2018-02-12 PROCEDURE — 25000125 ZZHC RX 250: Performed by: INTERNAL MEDICINE

## 2018-02-12 PROCEDURE — 36415 COLL VENOUS BLD VENIPUNCTURE: CPT | Performed by: INTERNAL MEDICINE

## 2018-02-12 PROCEDURE — 36000053 ZZH SURGERY LEVEL 2 EA 15 ADDTL MIN - UMMC: Performed by: INTERNAL MEDICINE

## 2018-02-12 PROCEDURE — 85610 PROTHROMBIN TIME: CPT | Performed by: INTERNAL MEDICINE

## 2018-02-12 RX ORDER — GLYCOPYRROLATE 0.2 MG/ML
INJECTION, SOLUTION INTRAMUSCULAR; INTRAVENOUS PRN
Status: DISCONTINUED | OUTPATIENT
Start: 2018-02-12 | End: 2018-02-12

## 2018-02-12 RX ORDER — NALOXONE HYDROCHLORIDE 0.4 MG/ML
.1-.4 INJECTION, SOLUTION INTRAMUSCULAR; INTRAVENOUS; SUBCUTANEOUS
Status: DISCONTINUED | OUTPATIENT
Start: 2018-02-12 | End: 2018-02-12 | Stop reason: HOSPADM

## 2018-02-12 RX ORDER — HEPARIN SODIUM,PORCINE 10 UNIT/ML
5-10 VIAL (ML) INTRAVENOUS EVERY 24 HOURS
Status: DISCONTINUED | OUTPATIENT
Start: 2018-02-12 | End: 2018-02-12 | Stop reason: HOSPADM

## 2018-02-12 RX ORDER — SODIUM CHLORIDE, SODIUM LACTATE, POTASSIUM CHLORIDE, CALCIUM CHLORIDE 600; 310; 30; 20 MG/100ML; MG/100ML; MG/100ML; MG/100ML
INJECTION, SOLUTION INTRAVENOUS CONTINUOUS PRN
Status: DISCONTINUED | OUTPATIENT
Start: 2018-02-12 | End: 2018-02-12

## 2018-02-12 RX ORDER — FLUMAZENIL 0.1 MG/ML
0.2 INJECTION, SOLUTION INTRAVENOUS
Status: DISCONTINUED | OUTPATIENT
Start: 2018-02-12 | End: 2018-02-12 | Stop reason: HOSPADM

## 2018-02-12 RX ORDER — LIDOCAINE 40 MG/G
CREAM TOPICAL
Status: DISCONTINUED | OUTPATIENT
Start: 2018-02-12 | End: 2018-02-12 | Stop reason: HOSPADM

## 2018-02-12 RX ORDER — HEPARIN SODIUM (PORCINE) LOCK FLUSH IV SOLN 100 UNIT/ML 100 UNIT/ML
5 SOLUTION INTRAVENOUS
Status: DISCONTINUED | OUTPATIENT
Start: 2018-02-12 | End: 2018-02-12 | Stop reason: HOSPADM

## 2018-02-12 RX ORDER — PROPOFOL 10 MG/ML
INJECTION, EMULSION INTRAVENOUS PRN
Status: DISCONTINUED | OUTPATIENT
Start: 2018-02-12 | End: 2018-02-12

## 2018-02-12 RX ORDER — HEPARIN SODIUM,PORCINE 10 UNIT/ML
5-10 VIAL (ML) INTRAVENOUS
Status: DISCONTINUED | OUTPATIENT
Start: 2018-02-12 | End: 2018-02-12 | Stop reason: HOSPADM

## 2018-02-12 RX ORDER — ONDANSETRON 2 MG/ML
INJECTION INTRAMUSCULAR; INTRAVENOUS PRN
Status: DISCONTINUED | OUTPATIENT
Start: 2018-02-12 | End: 2018-02-12

## 2018-02-12 RX ORDER — FENTANYL CITRATE 50 UG/ML
INJECTION, SOLUTION INTRAMUSCULAR; INTRAVENOUS PRN
Status: DISCONTINUED | OUTPATIENT
Start: 2018-02-12 | End: 2018-02-12

## 2018-02-12 RX ORDER — ONDANSETRON 2 MG/ML
4 INJECTION INTRAMUSCULAR; INTRAVENOUS
Status: DISCONTINUED | OUTPATIENT
Start: 2018-02-12 | End: 2018-02-12 | Stop reason: HOSPADM

## 2018-02-12 RX ADMIN — PROPOFOL 200 MG: 10 INJECTION, EMULSION INTRAVENOUS at 12:57

## 2018-02-12 RX ADMIN — ROCURONIUM BROMIDE 5 MG: 10 INJECTION INTRAVENOUS at 12:57

## 2018-02-12 RX ADMIN — SIMETHICONE 133 MG: 63.3; 3.7 SOLUTION/ DROPS ORAL at 13:14

## 2018-02-12 RX ADMIN — ONDANSETRON 4 MG: 2 INJECTION INTRAMUSCULAR; INTRAVENOUS at 13:29

## 2018-02-12 RX ADMIN — Medication 100 MG: at 12:57

## 2018-02-12 RX ADMIN — WATER 100 ML: 100 IRRIGANT IRRIGATION at 13:18

## 2018-02-12 RX ADMIN — FENTANYL CITRATE 100 MCG: 50 INJECTION, SOLUTION INTRAMUSCULAR; INTRAVENOUS at 12:57

## 2018-02-12 RX ADMIN — SUGAMMADEX 200 MG: 100 INJECTION, SOLUTION INTRAVENOUS at 13:29

## 2018-02-12 RX ADMIN — SODIUM CHLORIDE, POTASSIUM CHLORIDE, SODIUM LACTATE AND CALCIUM CHLORIDE: 600; 310; 30; 20 INJECTION, SOLUTION INTRAVENOUS at 12:45

## 2018-02-12 RX ADMIN — GLYCOPYRROLATE 0.2 MG: 0.2 INJECTION, SOLUTION INTRAMUSCULAR; INTRAVENOUS at 12:57

## 2018-02-12 RX ADMIN — ROCURONIUM BROMIDE 15 MG: 10 INJECTION INTRAVENOUS at 13:08

## 2018-02-12 RX ADMIN — HEPARIN SODIUM (PORCINE) LOCK FLUSH IV SOLN 100 UNIT/ML 5 ML: 100 SOLUTION at 15:42

## 2018-02-12 NOTE — IP AVS SNAPSHOT
Same Day Surgery [address] [address]    Phone:  [phone]                                       After Visit Summary   2/12/2018    Mono Varghese    MRN: [id number]           After Visit Summary Signature Page     I have received my discharge instructions, and my questions have been answered. I have discussed any challenges I see with this plan with the nurse or doctor.    ..........................................................................................................................................  Patient/Patient Representative Signature      ..........................................................................................................................................  Patient Representative Print Name and Relationship to Patient    ..................................................               ................................................  Date                                            Time    ..........................................................................................................................................  Reviewed by Signature/Title    ...................................................              ..............................................  Date                                                            Time

## 2018-02-12 NOTE — IP AVS SNAPSHOT
MRN:[id number]                      After Visit Summary   2/12/2018    Mono Varghese    MRN: [id number]           Thank you!     Thank you for choosing [facility] for your care. Our goal is always to provide you with excellent care. Hearing back from our patients is one way we can continue to improve our services. Please take a few minutes to complete the written survey that you may receive in the mail after you visit with us. Thank you!        Patient Information     Date Of Birth          1970        About your hospital stay     You were admitted on:  February 12, 2018 You last received care in the:  Same Day Surgery [facility]    You were discharged on:  February 12, 2018       Who to Call     For medical emergencies, please call 911.  For non-urgent questions about your medical care, please call your primary care provider or clinic, [phone]  For questions related to your surgery, please call your surgery clinic        Attending Provider     Provider Specialty    Guru Jose Kelly MD Gastroenterology       Primary Care Provider Office Phone # Fax #    [place] [phone]      After Care Instructions     Discharge Instructions       Resume pre procedure diet            Discharge Instructions       Restart home medications.                  Your next 10 appointments already scheduled     Feb 19, 2018  9:00 AM CST   New Visit with King Quiles DPM   [facility]    [address] [place] [address] [phone]            Mar 07, 2018  2:20 PM CST   (Arrive by 2:05 PM)   Return Visit with Abdelrahman Narayan PA-C   [facility] ([facility])    [address] [place] [address] [phone]            Mar 20, 2018  9:30 AM CDT   (Arrive by 9:15 AM)   Return Visit with [place] MD     [facility] ([facility]    [address] [facility]  4th Floor  [facility] [address]   [phone]            Apr 17, 2018  9:00 AM CDT   Lab with  LAB   [facility] ([facility])    [address] [facility]  1st Floor  [facility] [address]   [phone]            Apr 17, 2018  9:40 AM CDT   (Arrive by 9:25 AM)   Return General Liver with Beatriz Tanner MD   [facility] ([facility])    [address] [place] [address]  [address]  [facility] [address]   [phone]            Apr 27, 2018 11:30 AM CDT   (Arrive by 11:15 AM)   MR BRAIN W/O & W CONTRAST with UC1   [facility] ([facility])    [address] [facility]  1st [facility] [address]   [phone]           Take your medicines as usual, unless your doctor tells you not to. Bring a list of your current medicines to your exam (including vitamins, minerals and over-the-counter drugs).  You may or may not receive intravenous (IV) contrast for this exam pending the discretion of the Radiologist.  You do not need to do anything special to prepare.  The MRI machine uses a strong magnet. Please wear clothes without metal (snaps, zippers). A sweatsuit works well, or we may give you a hospital gown.  Please remove any body piercings and hair extensions before you arrive. You will also remove watches, jewelry, hairpins, wallets, dentures, partial dental plates and hearing aids. You may wear contact lenses, and you may be able to wear your rings. We have a safe place to keep your personal items, but it is safer to leave them at home.  **IMPORTANT** THE INSTRUCTIONS BELOW ARE ONLY FOR THOSE PATIENTS WHO HAVE BEEN PRESCRIBED SEDATION OR GENERAL ANESTHESIA DURING THEIR MRI PROCEDURE:  IF YOUR DOCTOR PRESCRIBED ORAL SEDATION (take medicine to help you relax during your exam):   You must get the medicine from your  doctor (oral medication) before you arrive. Bring the medicine to the exam. Do not take it at home. You ll be told when to take it upon arriving for your exam.   Arrive one hour early. Bring someone who can take you home after the test. Your medicine will make you sleepy. After the exam, you may not drive, take a bus or take a taxi by yourself.  IF YOUR DOCTOR PRESCRIBED IV SEDATION:   Arrive one hour early. Bring someone who can take you home after the test. Your medicine will make you sleepy. After the exam, you may not drive, take a bus or take a taxi by yourself.   No eating 6 hours before your exam. You may have clear liquids up until 4 hours before your exam. (Clear liquids include water, clear tea, black coffee and fruit juice without pulp.)  IF YOUR DOCTOR PRESCRIBED ANESTHESIA (be asleep for your exam):   Arrive 1 1/2 hours early. Bring someone who can take you home after the test. You may not drive, take a bus or take a taxi by yourself.   No eating 8 hours before your exam. You may have clear liquids up until 4 hours before your exam. (Clear liquids include water, clear tea, black coffee and fruit juice without pulp.)   You will spend four to five hours in the recovery room.  Please call the Imaging Department at your exam site with any questions.            May 04, 2018 11:00 AM CDT   (Arrive by 10:45 AM)   Return Visit with Lauro Shaw MD   [facility] ([facility])    [address] [place] [address]  [address]  [facility] [address] [phone]              Further instructions from your care team       [facility]  Same-Day Surgery   Adult Discharge Orders & Instructions     For 24 hours after surgery    1. Get plenty of rest.  A responsible adult must stay with you for at least 24 hours after you leave the hospital.   2. Do not drive or use heavy equipment.  If you have weakness or tingling, don't drive or use heavy equipment  "until this feeling goes away.  3. Do not drink alcohol.  4. Avoid strenuous or risky activities.  Ask for help when climbing stairs.   5. You may feel lightheaded.  IF so, sit for a few minutes before standing.  Have someone help you get up.   6. If you have nausea (feel sick to your stomach): Drink only clear liquids such as apple juice, ginger ale, broth or 7-Up.  Rest may also help.  Be sure to drink enough fluids.  Move to a regular diet as you feel able.  7. You may have a slight fever. Call the doctor if your fever is over 100 F (37.7 C) (taken under the tongue) or lasts longer than 24 hours.  8. You may have a dry mouth, a sore throat, muscle aches or trouble sleeping.  These should go away after 24 hours.  9. Do not make important or legal decisions.   Call your doctor for any of the followin.  Signs of infection (fever, growing tenderness at the surgery site, a large amount of drainage or bleeding, severe pain, foul-smelling drainage, redness, swelling).    2. It has been over 8 to 10 hours since surgery and you are still not able to urinate (pass water).    3.  Headache for over 24 hours.           To contact a doctor, call Dr. Guru Kelly @ [phone] or:    x   [phone] and ask for the resident on call for Gastroenterology (answered 24 hours a day)  x   Emergency Department:    [facility]: [phone]       (TTY for hearing impaired: [phone])          Pending Results     No orders found from 2/10/2018 to 2018.            Admission Information     Date & Time Provider Department Dept. Phone    2018 Guru Jose Kelly MD Same Day Surgery [facility] [place] [phone]      Your Vitals Were     Blood Pressure Temperature Respirations Height Weight Pulse Oximetry    127/81 97.5  F (36.4  C) 16 1.78 m (5' 10.08\") 96.7 kg (213 lb 3 oz) 97%    BMI (Body Mass Index)                   30.52 kg/m2           MyChart Information     [facility] gives you secure " access to your electronic health record. If you see a primary care provider, you can also send messages to your care team and make appointments. If you have questions, please call your primary care clinic.  If you do not have a primary care provider, please call [phone] and they will assist you.        Care EveryWhere ID     This is your Care EveryWhere ID. This could be used by other organizations to access your [facility] medical records  [id number]        Equal Access to Services     SAMUEL FAIR : Jennifer lirao Sojoali, waaxda luqadaha, qaybta kaalmada adelyly, josue tamayo. So [facility] [phone].    ATENCIÓN: Si habla español, tiene a chiang disposición servicios gratuitos de asistencia lingüística. Llame al [phone].    We comply with applicable federal civil rights laws and [place] laws. We do not discriminate on the basis of race, color, national origin, age, disability, sex, sexual orientation, or gender identity.               Review of your medicines      UNREVIEWED medicines. Ask your doctor about these medicines        Dose / Directions    Calcium Carb-Cholecalciferol 500-400 MG-UNIT Tabs   Commonly known as:  calcium 500 +D   Used for:  Malnutrition (H)        Dose:  500 mg   Take 500 mg by mouth daily   Quantity:  90 tablet   Refills:  1       cyanocobalamin 1000 MCG tablet   Commonly known as:  vitamin  B-12   Used for:  Alcoholic cirrhosis of liver with ascites (H)        Dose:  1000 mcg   1 tablet (1,000 mcg) by Per G Tube route daily   Quantity:  130 tablet   Refills:  2       dulaglutide 1.5 MG/0.5ML pen   Commonly known as:  TRULICITY   Used for:  DM type 2, goal HbA1c 7%-8% (H)        Dose:  1.5 mg   Inject 1.5 mg Subcutaneous every 7 days   Quantity:  6 mL   Refills:  1       ferrous sulfate 325 (65 FE) MG tablet   Commonly known as:  IRON   Used for:  Other iron deficiency anemia        Dose:  1 tablet   Take 1 tablet (325 mg) by mouth 2 times  daily   Quantity:  200 tablet   Refills:  3       gabapentin 100 MG capsule   Commonly known as:  NEURONTIN   Used for:  Mild episode of recurrent major depressive disorder (H)        Dose:  100 mg   Take 1 capsule (100 mg) by mouth At Bedtime   Quantity:  30 capsule   Refills:  3       glipiZIDE 10 MG 24 hr tablet   Commonly known as:  GLUCOTROL XL   Used for:  DM type 2, goal HbA1c 7%-8% (H)        Dose:  10 mg   Take 1 tablet (10 mg) by mouth daily   Quantity:  90 tablet   Refills:  1       HYDROcodone-acetaminophen 5-325 MG per tablet   Commonly known as:  NORCO   Used for:  Brain tumor (H)        Dose:  2 tablet   Take 2 tablets by mouth every 6 hours as needed for moderate to severe pain   Quantity:  60 tablet   Refills:  0       hydrOXYzine 25 MG tablet   Commonly known as:  ATARAX   Used for:  Itching, Anxiety        Dose:  25 mg   Take 1 tablet (25 mg) by mouth 2 times daily   Quantity:  180 tablet   Refills:  1       lactulose 10 GM/15ML solution   Commonly known as:  CHRONULAC   Used for:  Hepatic encephalopathy (H)        Dose:  5 g   Take 7.5 mLs (5 g) by mouth 2 times daily   Quantity:  473 mL   Refills:  1       levothyroxine 88 MCG tablet   Commonly known as:  SYNTHROID/LEVOTHROID   Used for:  Hypothyroidism        Dose:  88 mcg   Take 1 tablet (88 mcg) by mouth daily   Quantity:  90 tablet   Refills:  3       lidocaine 4 % Crea cream   Commonly known as:  LMX4   Used for:  Port catheter in place        Apply topically once as needed for mild pain   Quantity:  133 g   Refills:  1       methylphenidate 10 MG tablet   Commonly known as:  RITALIN   Used for:  Major depressive disorder with single episode, in partial remission (H)        10mg twice a day   Quantity:  60 tablet   Refills:  0       mirtazapine 45 MG tablet   Commonly known as:  REMERON   Used for:  Major depressive disorder with single episode, in partial remission (H)        Dose:  45 mg   Take 1 tablet (45 mg) by mouth At Bedtime    Quantity:  30 tablet   Refills:  3       multivitamin, therapeutic with minerals Tabs tablet        Dose:  1 tablet   Take 1 tablet by mouth 2 times daily   Refills:  0       omeprazole 20 MG CR capsule   Commonly known as:  priLOSEC   Used for:  Gastroesophageal reflux disease without esophagitis        Dose:  40 mg   Take 2 capsules (40 mg) by mouth 2 times daily   Quantity:  360 capsule   Refills:  3       phenytoin 30 MG CR capsule   Commonly known as:  DILANTIN   Used for:  Oligoastrocytoma of parietal lobe (H)        Dose:  120 mg   Take 4 capsules (120 mg) by mouth 2 times daily   Quantity:  720 capsule   Refills:  1       pravastatin 80 MG tablet   Commonly known as:  PRAVACHOL   Used for:  High cholesterol        Dose:  80 mg   Take 1 tablet (80 mg) by mouth daily   Quantity:  90 tablet   Refills:  3       rifaximin 550 MG Tabs tablet   Commonly known as:  XIFAXAN   Used for:  Hepatic encephalopathy (H)        Dose:  550 mg   Take 1 tablet (550 mg) by mouth 2 times daily   Quantity:  60 tablet   Refills:  11       simethicone 80 MG chewable tablet   Commonly known as:  MYLICON   Used for:  Abdominal cramping        Dose:  80 mg   Take 1 tablet (80 mg) by mouth every 6 hours as needed for cramping   Quantity:  60 tablet   Refills:  1       sulfamethoxazole-trimethoprim 800-160 MG per tablet   Commonly known as:  BACTRIM DS/SEPTRA DS   Used for:  Spontaneous bacterial peritonitis (H)        Dose:  1 tablet   Take 1 tablet by mouth daily   Quantity:  30 tablet   Refills:  5       thiamine 100 MG tablet   Used for:  Alcoholic cirrhosis of liver with ascites (H)        Dose:  100 mg   Take 1 tablet (100 mg) by mouth daily   Quantity:  100 tablet   Refills:  1       traZODone 50 MG tablet   Commonly known as:  DESYREL   Used for:  Primary insomnia        Dose:  50 mg   Take 1 tablet (50 mg) by mouth nightly as needed for sleep   Quantity:  30 tablet   Refills:  3         CONTINUE these medicines which have NOT  CHANGED        Dose / Directions    blood glucose monitoring test strip   Commonly known as:  ONETOUCH ULTRA   Used for:  Diabetes mellitus, type 2 (H)        Use to test blood sugars 4 times daily as needed or as directed.   Quantity:  300 each   Refills:  4       CANE, ANY MATERIAL   Used for:  Balance disorder        One cane   Quantity:  1 each   Refills:  0       ONETOUCH LANCETS Misc   Used for:  Type II or unspecified type diabetes mellitus without mention of complication, not stated as uncontrolled        by In Vitro route 4 times daily as needed   Quantity:  100 each   Refills:  prn                Protect others around you: Learn how to safely use, store and throw away your medicines at www.disposemymeds.org.             Medication List: This is a list of all your medications and when to take them. Check marks below indicate your daily home schedule. Keep this list as a reference.      Medications           Morning Afternoon Evening Bedtime As Needed    blood glucose monitoring test strip   Commonly known as:  ONETOUCH ULTRA   Use to test blood sugars 4 times daily as needed or as directed.                                Calcium Carb-Cholecalciferol 500-400 MG-UNIT Tabs   Commonly known as:  calcium 500 +D   Take 500 mg by mouth daily                                CANE, ANY MATERIAL   One cane                                cyanocobalamin 1000 MCG tablet   Commonly known as:  vitamin  B-12   1 tablet (1,000 mcg) by Per G Tube route daily                                dulaglutide 1.5 MG/0.5ML pen   Commonly known as:  TRULICITY   Inject 1.5 mg Subcutaneous every 7 days                                ferrous sulfate 325 (65 FE) MG tablet   Commonly known as:  IRON   Take 1 tablet (325 mg) by mouth 2 times daily                                gabapentin 100 MG capsule   Commonly known as:  NEURONTIN   Take 1 capsule (100 mg) by mouth At Bedtime                                glipiZIDE 10 MG 24 hr tablet    Commonly known as:  GLUCOTROL XL   Take 1 tablet (10 mg) by mouth daily                                HYDROcodone-acetaminophen 5-325 MG per tablet   Commonly known as:  NORCO   Take 2 tablets by mouth every 6 hours as needed for moderate to severe pain                                hydrOXYzine 25 MG tablet   Commonly known as:  ATARAX   Take 1 tablet (25 mg) by mouth 2 times daily                                lactulose 10 GM/15ML solution   Commonly known as:  CHRONULAC   Take 7.5 mLs (5 g) by mouth 2 times daily                                levothyroxine 88 MCG tablet   Commonly known as:  SYNTHROID/LEVOTHROID   Take 1 tablet (88 mcg) by mouth daily                                lidocaine 4 % Crea cream   Commonly known as:  LMX4   Apply topically once as needed for mild pain                                methylphenidate 10 MG tablet   Commonly known as:  RITALIN   10mg twice a day                                mirtazapine 45 MG tablet   Commonly known as:  REMERON   Take 1 tablet (45 mg) by mouth At Bedtime                                multivitamin, therapeutic with minerals Tabs tablet   Take 1 tablet by mouth 2 times daily                                omeprazole 20 MG CR capsule   Commonly known as:  priLOSEC   Take 2 capsules (40 mg) by mouth 2 times daily                                ONETOUCH LANCETS Misc   by In Vitro route 4 times daily as needed                                phenytoin 30 MG CR capsule   Commonly known as:  DILANTIN   Take 4 capsules (120 mg) by mouth 2 times daily                                pravastatin 80 MG tablet   Commonly known as:  PRAVACHOL   Take 1 tablet (80 mg) by mouth daily                                rifaximin 550 MG Tabs tablet   Commonly known as:  XIFAXAN   Take 1 tablet (550 mg) by mouth 2 times daily                                simethicone 80 MG chewable tablet   Commonly known as:  MYLICON   Take 1 tablet (80 mg) by mouth every 6 hours as  needed for cramping                                sulfamethoxazole-trimethoprim 800-160 MG per tablet   Commonly known as:  BACTRIM DS/SEPTRA DS   Take 1 tablet by mouth daily                                thiamine 100 MG tablet   Take 1 tablet (100 mg) by mouth daily                                traZODone 50 MG tablet   Commonly known as:  DESYREL   Take 1 tablet (50 mg) by mouth nightly as needed for sleep

## 2018-02-12 NOTE — OR NURSING
Mother in law Caitie is here and filled in the Gaps that Mono was unable to do like the medications and what was being done

## 2018-02-12 NOTE — OR NURSING
Pt noted to have blood shot eyes bi lat outer corners - Pt denies pain or vision changes; pt states can not recall if eyes were like this before surgery.   This information reported to [facility] Jesus Alberto upon transfer to phase two - no intervention ordered/ required per [facility].

## 2018-02-12 NOTE — OR NURSING
Dr. Guru Kelly at the bedside to discuss today's procedure with the patient.  The plan is for the GI Clinic to call patient and schedule another EGD in 4 weeks.  Patient and Mother-in-law are in agreement on the treatment plan.

## 2018-02-12 NOTE — DISCHARGE INSTRUCTIONS
[facility]  Same-Day Surgery   Adult Discharge Orders & Instructions     For 24 hours after surgery    1. Get plenty of rest.  A responsible adult must stay with you for at least 24 hours after you leave the hospital.   2. Do not drive or use heavy equipment.  If you have weakness or tingling, don't drive or use heavy equipment until this feeling goes away.  3. Do not drink alcohol.  4. Avoid strenuous or risky activities.  Ask for help when climbing stairs.   5. You may feel lightheaded.  IF so, sit for a few minutes before standing.  Have someone help you get up.   6. If you have nausea (feel sick to your stomach): Drink only clear liquids such as apple juice, ginger ale, broth or 7-Up.  Rest may also help.  Be sure to drink enough fluids.  Move to a regular diet as you feel able.  7. You may have a slight fever. Call the doctor if your fever is over 100 F (37.7 C) (taken under the tongue) or lasts longer than 24 hours.  8. You may have a dry mouth, a sore throat, muscle aches or trouble sleeping.  These should go away after 24 hours.  9. Do not make important or legal decisions.   Call your doctor for any of the followin.  Signs of infection (fever, growing tenderness at the surgery site, a large amount of drainage or bleeding, severe pain, foul-smelling drainage, redness, swelling).    2. It has been over 8 to 10 hours since surgery and you are still not able to urinate (pass water).    3.  Headache for over 24 hours.           To contact a doctor, call Dr. Guru Kelly @ [phone] or:    x   [phone] and ask for the resident on call for Gastroenterology (answered 24 hours a day)  x   Emergency Department:    [facility]: [phone]       (TTY for hearing impaired: [phone])

## 2018-02-12 NOTE — OR NURSING
Pt arrived to PACU.  PACU Rn assumed care of pt @ 1340.  Pt able to state name and deny pain or nausea at present time.  Lungs equal and clear bi lat; tolerating nasal cannula.  Minimal amount bloody drainage from nose; blood tinged sputum.    See flowsheet.  MARLENA Granda to bedside to assess pt @ 1350.

## 2018-02-12 NOTE — OR NURSING
Pt meeting phase one completion criteria @ 1430.  Pt cont alert, denies pain or nausea.   Lungs cont equal and clear bi lat.  Tolerating wean to room air.  Pt tolerating ice chips.  No bleeding noted from nose or mouth - scant amount pink tinged sputum occasionally with coughing.  SBAR Hand off report to 3c RN as noted  .

## 2018-02-12 NOTE — OR NURSING
Blood sugar 79.  Juice and pudding given to patient.  Patient takes oral agents for diabetes and checks blood sugars at home.

## 2018-02-12 NOTE — PROGRESS NOTES
[facility] utilizes an encounter to take the place of a direct phone call to your office. Please take a moment to review the below request. Your reply to this encounter will act as a verbal OK of orders if requested below. Thank you.    ORDER  Requesting delay in PT eval until 2//16/18 per pt/family request and multiple appointments this week.  Ruben Whittington PT  [phone]

## 2018-02-12 NOTE — ANESTHESIA POSTPROCEDURE EVALUATION
Patient: Mono Varghese    Procedure(s):  Esophagogastroduodenoscopy with variceal banding - Wound Class: II-Clean Contaminated    Diagnosis:Variceal Surveillance  Diagnosis Additional Information: No value filed.    Anesthesia Type:  General, ETT    Note:  Anesthesia Post Evaluation    Patient location during evaluation: PACU  Patient participation: Able to fully participate in evaluation  Level of consciousness: awake  Pain management: adequate  Airway patency: patent  Cardiovascular status: acceptable  Respiratory status: acceptable  Hydration status: acceptable  PONV: none     Anesthetic complications: None          Last vitals:  Vitals:    02/12/18 1340 02/12/18 1355 02/12/18 1410   BP: (!) 143/100 (!) 133/98 (!) 139/100   Resp: 18 14 16   Temp: 37.2  C (99  F) 36.2  C (97.2  F) 36.3  C (97.3  F)   SpO2: 99% 98% 99%         Electronically Signed By: Chaparro Granda MD  February 12, 2018  2:19 PM

## 2018-02-12 NOTE — ANESTHESIA PREPROCEDURE EVALUATION
Anesthesia Evaluation     . Pt has had prior anesthetic. Type: General    No history of anesthetic complications          ROS/MED HX    ENT/Pulmonary:     (+)sleep apnea, uses CPAP , . .    Neurologic:       Cardiovascular:         METS/Exercise Tolerance:     Hematologic:         Musculoskeletal:         GI/Hepatic:     (+) liver disease, Other GI/Hepatic esophageal varices      Renal/Genitourinary:         Endo:         Psychiatric:         Infectious Disease:         Malignancy:   (+) Malignancy History of Neuro  Neuro CA Active status post.          Other:                     Physical Exam  Normal systems: cardiovascular, pulmonary and dental    Airway   Mallampati: II  TM distance: >3 FB  Neck ROM: full    Dental     Cardiovascular       Pulmonary                     Anesthesia Plan      History & Physical Review  History and physical reviewed and following examination; no interval change.    ASA Status:  4 .    NPO Status:  > 8 hours    Plan for General and ETT with Intravenous induction. Maintenance will be Balanced.    PONV prophylaxis:  Ondansetron (or other 5HT-3)  ANESTHESIA PREOP EVALUATION    NPO Status: [unfilled]    Procedure: EGD    HPI: Patient with cirrhosis and bleeding esophageal varices for repeat EGD and banding.     PMHx/PSHx/ROS:  PAST MEDICAL HISTORY: @PMH@    PAST SURGICAL HISTORY: @PSH@    FAMILY HISTORY: [unfilled]      Past Anes Hx: No personal or family h/o anesthesia problems    Soc Hx:   Tobacco: no  EtOH: no    Allergies: [unfilled]    Meds:   [unfilled]    No current outpatient prescriptions on file.      Physical Exam:  VS: T 98.2, P Data Unavailable, /87, R 16, SpO2 [unfilled]     Airway: MP 2, TM>3FB, Neck full ROM  Dentition: no loose teeth  Heart: RRR  Lungs: CTAB      BMP:  Lab Results       Component                Value               Date                       NA                       139                 02/09/2018             Lab Results       Component                 Value               Date                       POTASSIUM                4.2                 02/09/2018            Lab Results       Component                Value               Date                       CHLORIDE                 107                 02/09/2018            Lab Results       Component                Value               Date                       UCHE                      8.3                 02/09/2018            Lab Results       Component                Value               Date                       CO2                      28                  02/09/2018            Lab Results       Component                Value               Date                       BUN                      7                   02/09/2018            Lab Results       Component                Value               Date                       CR                       0.99                02/09/2018            Lab Results       Component                Value               Date                       GLC                      106                 02/09/2018               CBC:  Lab Results       Component                Value               Date                       WBC                      1.8                 02/09/2018            Lab Results       Component                Value               Date                       HGB                      12.8                02/09/2018            Lab Results       Component                Value               Date                       HCT                      37.6                02/09/2018            Lab Results       Component                Value               Date                       PLT                      45                  02/09/2018               Coags/Type and Screen  Lab Results       Component                Value               Date                       INR                      1.46                02/09/2018            No results found for: PT  Type and Screen:      Assessment/Plan:  - ASA  4  - GETA with standard ASA monitors, IV induction, balanced anesthetic  - PIV  - Antibiotics per surgery  - PONV prophylaxis  - Blood products available, possible administration discussed with patient    Chaparro Granda M.D.    2/12/2018  10:35 AM            Postoperative Care      Consents  Anesthetic plan, risks, benefits and alternatives discussed with:  Patient.  Use of blood products discussed: Yes.   Use of blood products discussed with Patient.  Consented to blood products.  .                          .

## 2018-02-12 NOTE — ANESTHESIA CARE TRANSFER NOTE
Patient: Mono Varghese    Procedure(s):  Esophagogastroduodenoscopy with variceal banding - Wound Class: II-Clean Contaminated    Diagnosis: Variceal Surveillance  Diagnosis Additional Information: No value filed.    Anesthesia Type:   General, ETT     Note:  Airway :Face Mask  Patient transferred to:PACU  Comments: Pt to recovery room.  Spontaneous respirations.   Report given to RN.  Handoff Report: Identifed the Patient, Identified the Reponsible Provider, Reviewed the pertinent medical history, Discussed the surgical course, Reviewed Intra-OP anesthesia mangement and issues during anesthesia, Set expectations for post-procedure period and Allowed opportunity for questions and acknowledgement of understanding      Vitals: (Last set prior to Anesthesia Care Transfer)    CRNA VITALS  2/12/2018 1305 - 2/12/2018 1339      2/12/2018             Pulse: 104    SpO2: 100 %    Resp Rate (observed): 22                Electronically Signed By: TIESHA Yanes CRNA  February 12, 2018  1:39 PM

## 2018-02-12 NOTE — OR NURSING
Pt;s mother in law called RN to discuss pt's low platelet level.  I spoke with MD Kelly  about levels - MD stated pt will not require platelet transfusion as long as bleeding cont to be well controlled.  MD also stated a recheck CBC is not needed unless large amount of bleeding occurs.

## 2018-02-12 NOTE — BRIEF OP NOTE
[facility] Brief Operative Note    Pre-operative diagnosis: Variceal Surveillance   Post-operative diagnosis * No post-op diagnosis entered *   Procedure: Procedure(s):  Esophagogastroduodenoscopy with variceal banding - Wound Class: II-Clean Contaminated   Surgeon: Guru Robin MD       Estimated blood loss: None    Specimens: None   Findings:    Esophageal varices s/p banding x 7  Portal hypertensive gastropathy    Recommendations  Repeat EGD in 3-4 weeks in OR

## 2018-02-14 ENCOUNTER — CARE COORDINATION (OUTPATIENT)
Dept: GASTROENTEROLOGY | Facility: CLINIC | Age: 48
End: 2018-02-14

## 2018-02-14 DIAGNOSIS — I85.00 ESOPHAGEAL VARICES (H): Primary | ICD-10-CM

## 2018-02-14 NOTE — PROGRESS NOTES
Post upper GI endoscopy (2/12/2018) with Dr. Kelly: Follow-up    Post procedure recommendations: Will recommend repeat endoscopy in 3-4 weeks for variceal surveillance and repeat band ligation as needed     Message left for him to call with any questions/concerns.     Orders placed: upper GI endoscopy and sent to scheduling    Contact information verified for future questions/concerns.    Shalini GERMAN RN Coordinator  Dr. Aragon, Dr. Dickens & Dr. Kelly  [facility]  [phone]

## 2018-02-19 ENCOUNTER — OFFICE VISIT (OUTPATIENT)
Dept: PODIATRY | Facility: CLINIC | Age: 48
End: 2018-02-19
Payer: MEDICARE

## 2018-02-19 VITALS
OXYGEN SATURATION: 98 % | BODY MASS INDEX: 30.52 KG/M2 | HEIGHT: 70 IN | DIASTOLIC BLOOD PRESSURE: 80 MMHG | SYSTOLIC BLOOD PRESSURE: 122 MMHG | HEART RATE: 81 BPM | WEIGHT: 213.19 LBS

## 2018-02-19 DIAGNOSIS — B35.3 TINEA PEDIS OF BOTH FEET: ICD-10-CM

## 2018-02-19 DIAGNOSIS — M20.42 HAMMER TOES OF BOTH FEET: ICD-10-CM

## 2018-02-19 DIAGNOSIS — M20.41 HAMMER TOES OF BOTH FEET: ICD-10-CM

## 2018-02-19 DIAGNOSIS — E11.49 TYPE II OR UNSPECIFIED TYPE DIABETES MELLITUS WITH NEUROLOGICAL MANIFESTATIONS, NOT STATED AS UNCONTROLLED(250.60) (H): Primary | ICD-10-CM

## 2018-02-19 PROCEDURE — 99203 OFFICE O/P NEW LOW 30 MIN: CPT | Performed by: PODIATRIST

## 2018-02-19 RX ORDER — CICLOPIROX OLAMINE 7.7 MG/G
CREAM TOPICAL 2 TIMES DAILY
Qty: 90 G | Refills: 4 | Status: SHIPPED | OUTPATIENT
Start: 2018-02-19 | End: 2019-01-01

## 2018-02-19 NOTE — PATIENT INSTRUCTIONS
Thanks for coming today.  [facility]  [address] [place] [address]    To schedule future appointments in Ortho Clinic, you may call [phone].    To schedule ordered imaging by your Provider: Call [facility] at [phone]    [employer] available online at:   [email or web address]    Please call if any further questions or concerns [phone] and ask for the Orthopedic Department. Clinic hours 8 am to 5 pm.    Return to clinic if symptoms worsen.

## 2018-02-19 NOTE — NURSING NOTE
"Mono Varghese's goals for this visit include: Callus both feet  He requests these members of his care team be copied on today's visit information: yes    PCP: King Louis    Referring Provider:  King Louis MD  [address] [place] [id number]    Chief Complaint   Patient presents with     Consult     Musculoskeletal Problem     Left foot callus       Initial /80  Pulse 81  Ht 1.78 m (5' 10.08\")  Wt 96.7 kg (213 lb 3 oz)  SpO2 98%  BMI 30.52 kg/m2 Estimated body mass index is 30.52 kg/(m^2) as calculated from the following:    Height as of this encounter: 1.78 m (5' 10.08\").    Weight as of this encounter: 96.7 kg (213 lb 3 oz).  Medication Reconciliation: complete    "

## 2018-02-19 NOTE — PROGRESS NOTES
Past Medical History:   Diagnosis Date     Brain tumor (H)      Liver failure (H)      Patient Active Problem List   Diagnosis     Type 2 diabetes mellitus (H)     Moderate major depression (H)     LENA (obstructive sleep apnea)-moderate (AHI 16)     Oligoastrocytoma of parietal lobe (H)     ADHD (attention deficit hyperactivity disorder)     Financial difficulties     Thrombocytopenia (H)     Partial epilepsy with impairment of consciousness (H)     Cirrhosis of liver (H)     Pulmonary nodules     Myopic astigmatism of both eyes     Presbyopia     Ringing in ears, unspecified laterality     GIB (gastrointestinal bleeding)     Portal vein thrombosis     Advance care planning     Chronic pain     Hyperlipidemia LDL goal <100     Pancytopenia (H)     Hypothyroidism     Malnutrition (H)     Hx of psychiatric care     Encephalopathy, hepatic (H)     Past Surgical History:   Procedure Laterality Date     COLONOSCOPY N/A 11/27/2015    Procedure: COMBINED COLONOSCOPY, SINGLE OR MULTIPLE BIOPSY/POLYPECTOMY BY BIOPSY;  Surgeon: Ran Thurston MD;  Location: [facility]     ESOPHAGOSCOPY, GASTROSCOPY, DUODENOSCOPY (EGD), COMBINED N/A 11/23/2015    Procedure: COMBINED ESOPHAGOSCOPY, GASTROSCOPY, DUODENOSCOPY (EGD);  Surgeon: Rocael Rizvi MD;  Location: [facility]     ESOPHAGOSCOPY, GASTROSCOPY, DUODENOSCOPY (EGD), COMBINED N/A 1/25/2017    Procedure: COMBINED ESOPHAGOSCOPY, GASTROSCOPY, DUODENOSCOPY (EGD), BIOPSY SINGLE OR MULTIPLE;  Surgeon: Rocael Tejada MD;  Location: [facility]     ESOPHAGOSCOPY, GASTROSCOPY, DUODENOSCOPY (EGD), COMBINED N/A 3/30/2017    Procedure: COMBINED ESOPHAGOSCOPY, GASTROSCOPY, DUODENOSCOPY (EGD);  Surgeon: Jordana Ramirez MD;  [facility]     ESOPHAGOSCOPY, GASTROSCOPY, DUODENOSCOPY (EGD), COMBINED N/A 1/19/2018    Procedure: COMBINED ESOPHAGOSCOPY, GASTROSCOPY, DUODENOSCOPY (EGD);  Upper Endoscopy with verceal banding; Flexible Sigmoidoscopy;  Surgeon: Guru Jose Kelly  MD Shivam;  Location:  OR     ESOPHAGOSCOPY, GASTROSCOPY, DUODENOSCOPY (EGD), COMBINED N/A 2/12/2018    Procedure: COMBINED ESOPHAGOSCOPY, GASTROSCOPY, DUODENOSCOPY (EGD);  Esophagogastroduodenoscopy with variceal banding;  Surgeon: Guru Jose Kelly MD;  Location:  OR     OPTICAL TRACKING SYSTEM CRANIOTOMY, EXCISE TUMOR, COMBINED  6/6/2013    Procedure: COMBINED OPTICAL TRACKING SYSTEM CRANIOTOMY, EXCISE TUMOR;  Left Stealth Guided Craniotomy , Tumor Resection ;  Surgeon: J Carlos Aranda MD;  Location:  OR     ORTHOPEDIC SURGERY      hand surgery- right     SIGMOIDOSCOPY FLEXIBLE N/A 11/24/2015    Procedure: SIGMOIDOSCOPY FLEXIBLE;  Surgeon: Rocael Rizvi MD;  Location:  GI     SIGMOIDOSCOPY FLEXIBLE N/A 1/19/2018    Procedure: SIGMOIDOSCOPY FLEXIBLE;;  Surgeon: Guru Jose Kelly MD;  Location:  OR     Social History     Social History     Marital status:      Spouse name: Yisel      Number of children: 1      Years of education: N/A     Occupational History            Social History Main Topics     Smoking status: Never Smoker     Smokeless tobacco: Never Used     Alcohol use No      Comment: Quit 01/2014     Drug use: No     Sexual activity: Yes     Partners: Female      Comment: not currently      Other Topics Concern     Parent/Sibling W/ Cabg, Mi Or Angioplasty Before 65f 55m? No     Social History Narrative    On disability      Family History   Problem Relation Age of Onset     Unknown/Adopted Mother      Lab Results   Component Value Date    A1C 7.8 12/20/2017      Inr                                 1.56                             2/12/2018  Lab Results   Component Value Date    WBC 1.9 02/12/2018     Lab Results   Component Value Date    RBC 3.68 02/12/2018     Lab Results   Component Value Date    HGB 12.0 02/12/2018     Lab Results   Component Value Date    HCT 35.8 02/12/2018     No components found for: MCT  Lab Results    Component Value Date    MCV 97 02/12/2018     Lab Results   Component Value Date    MCH 32.6 02/12/2018     Lab Results   Component Value Date    MCHC 33.5 02/12/2018     Lab Results   Component Value Date    RDW 14.8 02/12/2018     Lab Results   Component Value Date    PLT 43 02/12/2018   Last Basic Metabolic Panel:  Lab Results   Component Value Date     02/09/2018      Lab Results   Component Value Date    POTASSIUM 4.2 02/09/2018     Lab Results   Component Value Date    CHLORIDE 107 02/09/2018     Lab Results   Component Value Date    UCHE 8.3 02/09/2018     Lab Results   Component Value Date    CO2 28 02/09/2018     Lab Results   Component Value Date    BUN 7 02/09/2018     Lab Results   Component Value Date    CR 0.99 02/09/2018     Lab Results   Component Value Date     02/09/2018       SUBJECTIVE FINDINGS:  A 47-year-old male presents in consultation from King Louis MD, for calluses.  He presents with his caregiver.  He relates he is diabetic.  He relates his blood sugars have been high for a long time, but they are coming down.  He has got liver failure and brain cancer.  He relates a few weeks ago, they were putting lotion on his feet and they noticed calluses on the balls of the feet and they are concerned about them.  He relates no injuries.  He does have some balance issues.  He wears slippers mostly around the house.  He does not have diabetic shoes.  He has a nurse come out once a week, and they take care of his toenail care.  He has compression socks; he is not wearing those currently, but he does get swelling in his feet.  He relates no numbness, tingling or neuropathy.  No ulcers or sores in the past.      OBJECTIVE FINDINGS:  DP and PT are 2/4 bilaterally.  He has decreased ankle joint dorsiflexion bilaterally.  He has dorsally contracted digits 1-5 bilaterally.  He has some very mild dry, scaly skin with hyperkeratotic tissue buildup, plantar medial first MPJ and arch  bilaterally.  Deep tendon reflexes are intact bilaterally.  Sharp/dull is decreased with 5.07 Hamilton-Lion monofilament on the right hallux.  Otherwise, it is intact bilaterally.  Proprioception is intact bilaterally.  There are no gross tendon voids bilaterally.  There is no erythema, no drainage, no odor, no calor bilaterally.  He has varicosities bilaterally with some mild peripheral edema.  There is no pain on palpation.  Muscle strength is intact.  He has dystrophic, incurvated, thickened nails with subungual debris and dystrophy 1-5 bilaterally and discoloration.       ASSESSMENT AND PLAN:  Tinea pedis bilaterally.  Onychomycosis.  He is diabetic with peripheral neuropathy.  Hammertoes and equinus and varicosities with venous stasis present.  Diagnosis and treatment options discussed with the patient and caregiver.  They have compression socks.  He will start wearing those.  Prescription for Loprox cream given and use discussed with him for the tinea pedis changes and onychomycosis changes.  Prescription for diabetic shoes given and use discussed with him.  He is given the phone number and address to [facility] to get fitted for those.  Return to clinic and see me in about 3 months.

## 2018-02-19 NOTE — MR AVS SNAPSHOT
After Visit Summary   2/19/2018    Mono Varghese    MRN: [id number]           Patient Information     Date Of Birth          1970        Visit Information        Provider Department      2/19/2018 9:00 AM King Quiles DPM M [facility]        Today's Diagnoses     Type II or unspecified type diabetes mellitus with neurological manifestations, not stated as uncontrolled(250.60) (H)    -  1    Tinea pedis of both feet        Hammer toes of both feet          Care Instructions    Thanks for coming today.  Ortho[facility] Medicine Clinic  [address] [place] [address]    To schedule future appointments in Ortho Clinic, you may call [phone].    To schedule ordered imaging by your Provider: Call [facility] at [phone]    [employer] available online at:   [email or web address]    Please call if any further questions or concerns [phone] and ask for the Orthopedic Department. Clinic hours 8 am to 5 pm.    Return to clinic if symptoms worsen.            Follow-ups after your visit        Additional Services     ORTHOTICS REFERRAL       *This referral order prints off in the [facility]  (Orthotics & Prosthetics) Central Scheduling Office**    The [facility] Scheduling Staff will contact the patient to schedule appointments.     Central Scheduling Contact Information: [phone] ([place])    Diabetic shoes.  Custom Diabetic foot orthotics, 3 pair.      Please be aware that coverage of these services is subject to the terms and limitations of your health insurance plan.  Call member services at your health plan with any benefit or coverage questions.      Please bring the following to your appointment:    >>   Any x-rays, CTs or MRIs which have been performed.  Contact the facility where they were done to arrange for  prior to your scheduled appointment.    >>   List of current medications   >>   This referral  request   >>   Any documents/labs given to you for this referral                  Follow-up notes from your care team     Return in about 3 months (around 5/19/2018).      Your next 10 appointments already scheduled     Mar 07, 2018  2:20 PM CST   (Arrive by 2:05 PM)   Return Visit with Abdelrahman Narayan PA-C   [facility] ([facility])    [address] [facility] [address]   [phone]            Mar 20, 2018  9:30 AM CDT   (Arrive by 9:15 AM)   Return Visit with King Louis MD   [facility] ([facility])    [address] [facility] [address]   [phone]            Apr 17, 2018  9:00 AM CDT   Lab with  LAB   [facility] ([facility])    [address]  [address] [facility] [address]   [phone]            Apr 17, 2018  9:40 AM CDT   (Arrive by 9:25 AM)   Return General Liver with Beatriz Tanner MD   [facility] ([facility])    [address]  [address]  [facility] [address]   [phone]            Apr 27, 2018 11:30 AM CDT   (Arrive by 11:15 AM)   MR BRAIN W/O & W CONTRAST with [facility] ([facility])    [address] [place] [address]   [phone]           Take your medicines as usual, unless your doctor tells you not to. Bring a list of your current medicines to your exam (including vitamins, minerals and over-the-counter drugs).  You may or may not receive intravenous (IV) contrast for this exam pending the discretion of the Radiologist.  You do not need to do anything special to prepare.  The MRI machine uses a strong magnet. Please wear clothes without metal (snaps, zippers). A sweatsuit works well, or we may give you a hospital gown.  Please remove any body piercings and hair  extensions before you arrive. You will also remove watches, jewelry, hairpins, wallets, dentures, partial dental plates and hearing aids. You may wear contact lenses, and you may be able to wear your rings. We have a safe place to keep your personal items, but it is safer to leave them at home.  **IMPORTANT** THE INSTRUCTIONS BELOW ARE ONLY FOR THOSE PATIENTS WHO HAVE BEEN PRESCRIBED SEDATION OR GENERAL ANESTHESIA DURING THEIR MRI PROCEDURE:  IF YOUR DOCTOR PRESCRIBED ORAL SEDATION (take medicine to help you relax during your exam):   You must get the medicine from your doctor (oral medication) before you arrive. Bring the medicine to the exam. Do not take it at home. You ll be told when to take it upon arriving for your exam.   Arrive one hour early. Bring someone who can take you home after the test. Your medicine will make you sleepy. After the exam, you may not drive, take a bus or take a taxi by yourself.  IF YOUR DOCTOR PRESCRIBED IV SEDATION:   Arrive one hour early. Bring someone who can take you home after the test. Your medicine will make you sleepy. After the exam, you may not drive, take a bus or take a taxi by yourself.   No eating 6 hours before your exam. You may have clear liquids up until 4 hours before your exam. (Clear liquids include water, clear tea, black coffee and fruit juice without pulp.)  IF YOUR DOCTOR PRESCRIBED ANESTHESIA (be asleep for your exam):   Arrive 1 1/2 hours early. Bring someone who can take you home after the test. You may not drive, take a bus or take a taxi by yourself.   No eating 8 hours before your exam. You may have clear liquids up until 4 hours before your exam. (Clear liquids include water, clear tea, black coffee and fruit juice without pulp.)   You will spend four to five hours in the recovery room.  Please call the Imaging Department at your exam site with any questions.            May 04, 2018 11:00 AM CDT   (Arrive by 10:45 AM)   Return Visit with Lauro Shaw MD    [facility] ([facility])    [address]  [address]  [facility] [address] [phone]            May 21, 2018  9:30 AM CDT   Return Visit with King Quiles DPM   [facility] ([facility])    [address] [place] [address] [phone]              Who to contact     If you have questions or need follow up information about today's clinic visit or your schedule please contact [facility] directly at [phone].  Normal or non-critical lab and imaging results will be communicated to you by [employer], letter or phone within 4 business days after the clinic has received the results. If you do not hear from us within 7 days, please contact the clinic through [employer] or phone. If you have a critical or abnormal lab result, we will notify you by phone as soon as possible.  Submit refill requests through [email or web address] or call your pharmacy and they will forward the refill request to us. Please allow 3 business days for your refill to be completed.          Additional Information About Your Visit        [email or web address] Information     [email or web address] gives you secure access to your electronic health record. If you see a primary care provider, you can also send messages to your care team and make appointments. If you have questions, please call your primary care clinic.  If you do not have a primary care provider, please call [phone] and they will assist you.      [email or web address] is an electronic gateway that provides easy, online access to your medical records. With [email or web address], you can request a clinic appointment, read your test results, renew a prescription or communicate with your care team.     To access your existing account, please contact your [facility] or call [phone] for assistance.        Care EveryWhere ID     This is your Care EveryWhere ID. This could be used by other  "organizations to access your [facility] medical records  [id number]        Your Vitals Were     Pulse Height Pulse Oximetry BMI (Body Mass Index)          81 1.78 m (5' 10.08\") 98% 30.52 kg/m2         Blood Pressure from Last 3 Encounters:   02/19/18 122/80   02/12/18 (!) 141/94   02/09/18 121/78    Weight from Last 3 Encounters:   02/19/18 96.7 kg (213 lb 3 oz)   02/12/18 96.7 kg (213 lb 3 oz)   02/09/18 94.3 kg (207 lb 12.8 oz)              We Performed the Following     ORTHOTICS REFERRAL          Today's Medication Changes          These changes are accurate as of 2/19/18 11:03 AM.  If you have any questions, ask your nurse or doctor.               Start taking these medicines.        Dose/Directions    ciclopirox 0.77 % cream   Commonly known as:  LOPROX   Used for:  Tinea pedis of both feet   Started by:  King Quiles DPM        Apply topically 2 times daily To feet and toenails.   Quantity:  90 g   Refills:  4         These medicines have changed or have updated prescriptions.        Dose/Directions    cyanocobalamin 1000 MCG tablet   Commonly known as:  vitamin  B-12   This may have changed:  when to take this   Used for:  Alcoholic cirrhosis of liver with ascites (H)        Dose:  1000 mcg   1 tablet (1,000 mcg) by Per G Tube route daily   Quantity:  130 tablet   Refills:  2       dulaglutide 1.5 MG/0.5ML pen   Commonly known as:  TRULICITY   This may have changed:  additional instructions   Used for:  DM type 2, goal HbA1c 7%-8% (H)        Dose:  1.5 mg   Inject 1.5 mg Subcutaneous every 7 days   Quantity:  6 mL   Refills:  1       levothyroxine 88 MCG tablet   Commonly known as:  SYNTHROID/LEVOTHROID   This may have changed:  when to take this   Used for:  Hypothyroidism        Dose:  88 mcg   Take 1 tablet (88 mcg) by mouth daily   Quantity:  90 tablet   Refills:  3            Where to get your medicines      These medications were sent to [facility] [address] [place] [address] [facility]  " [address] [facility] [address]     Phone:  [phone]     ciclopirox 0.77 % cream                Primary Care Provider Office Phone # Fax #    King Louis [phone]       [address] [address]        Equal Access to Services     SAMUEL FAIR : Hadii aad ku hadasho Soomaali, waaxda luqadaha, qaybta kaalmada adeegyada, waxay idiin hayaan adeeg khserash laannabellan ah. So [facility] [phone].    ATENCIÓN: Si habla español, tiene a chiang disposición servicios gratuitos de asistencia lingüística. Llame al [phone].    We comply with applicable federal civil rights laws and [place] laws. We do not discriminate on the basis of race, color, national origin, age, disability, sex, sexual orientation, or gender identity.            Thank you!     Thank you for choosing [facility]  for your care. Our goal is always to provide you with excellent care. Hearing back from our patients is one way we can continue to improve our services. Please take a few minutes to complete the written survey that you may receive in the mail after your visit with us. Thank you!             Your Updated Medication List - Protect others around you: Learn how to safely use, store and throw away your medicines at www.disposemymeds.org.          This list is accurate as of 2/19/18 11:03 AM.  Always use your most recent med list.                   Brand Name Dispense Instructions for use Diagnosis    blood glucose monitoring test strip    ONETOUCH ULTRA    300 each    Use to test blood sugars 4 times daily as needed or as directed.    Diabetes mellitus, type 2 (H)       Calcium Carb-Cholecalciferol 500-400 MG-UNIT Tabs    calcium 500 +D    90 tablet    Take 500 mg by mouth daily    Malnutrition (H)       CANE, ANY MATERIAL     1 each    One cane    Balance disorder       ciclopirox 0.77 % cream    LOPROX    90 g    Apply topically 2 times daily To feet and toenails.    Tinea pedis of  both feet       cyanocobalamin 1000 MCG tablet    vitamin  B-12    130 tablet    1 tablet (1,000 mcg) by Per G Tube route daily    Alcoholic cirrhosis of liver with ascites (H)       dulaglutide 1.5 MG/0.5ML pen    TRULICITY    6 mL    Inject 1.5 mg Subcutaneous every 7 days    DM type 2, goal HbA1c 7%-8% (H)       ferrous sulfate 325 (65 FE) MG tablet    IRON    200 tablet    Take 1 tablet (325 mg) by mouth 2 times daily    Other iron deficiency anemia       gabapentin 100 MG capsule    NEURONTIN    30 capsule    Take 1 capsule (100 mg) by mouth At Bedtime    Mild episode of recurrent major depressive disorder (H)       glipiZIDE 10 MG 24 hr tablet    GLUCOTROL XL    90 tablet    Take 1 tablet (10 mg) by mouth daily    DM type 2, goal HbA1c 7%-8% (H)       HYDROcodone-acetaminophen 5-325 MG per tablet    NORCO    60 tablet    Take 2 tablets by mouth every 6 hours as needed for moderate to severe pain    Brain tumor (H)       hydrOXYzine 25 MG tablet    ATARAX    180 tablet    Take 1 tablet (25 mg) by mouth 2 times daily    Itching, Anxiety       lactulose 10 GM/15ML solution    CHRONULAC    473 mL    Take 7.5 mLs (5 g) by mouth 2 times daily    Hepatic encephalopathy (H)       levothyroxine 88 MCG tablet    SYNTHROID/LEVOTHROID    90 tablet    Take 1 tablet (88 mcg) by mouth daily    Hypothyroidism       lidocaine 4 % Crea cream    LMX4    133 g    Apply topically once as needed for mild pain    Port catheter in place       methylphenidate 10 MG tablet    RITALIN    60 tablet    10mg twice a day    Major depressive disorder with single episode, in partial remission (H)       mirtazapine 45 MG tablet    REMERON    30 tablet    Take 1 tablet (45 mg) by mouth At Bedtime    Major depressive disorder with single episode, in partial remission (H)       multivitamin, therapeutic with minerals Tabs tablet      Take 1 tablet by mouth 2 times daily        omeprazole 20 MG CR capsule    priLOSEC    360 capsule    Take 2  capsules (40 mg) by mouth 2 times daily    Gastroesophageal reflux disease without esophagitis       ONETOUCH LANCETS Misc     100 each    by In Vitro route 4 times daily as needed    Type II or unspecified type diabetes mellitus without mention of complication, not stated as uncontrolled       phenytoin 30 MG CR capsule    DILANTIN    720 capsule    Take 4 capsules (120 mg) by mouth 2 times daily    Oligoastrocytoma of parietal lobe (H)       pravastatin 80 MG tablet    PRAVACHOL    90 tablet    Take 1 tablet (80 mg) by mouth daily    High cholesterol       rifaximin 550 MG Tabs tablet    XIFAXAN    60 tablet    Take 1 tablet (550 mg) by mouth 2 times daily    Hepatic encephalopathy (H)       simethicone 80 MG chewable tablet    MYLICON    60 tablet    Take 1 tablet (80 mg) by mouth every 6 hours as needed for cramping    Abdominal cramping       sulfamethoxazole-trimethoprim 800-160 MG per tablet    BACTRIM DS/SEPTRA DS    30 tablet    Take 1 tablet by mouth daily    Spontaneous bacterial peritonitis (H)       thiamine 100 MG tablet     100 tablet    Take 1 tablet (100 mg) by mouth daily    Alcoholic cirrhosis of liver with ascites (H)       traZODone 50 MG tablet    DESYREL    30 tablet    Take 1 tablet (50 mg) by mouth nightly as needed for sleep    Primary insomnia

## 2018-02-20 ENCOUNTER — TELEPHONE (OUTPATIENT)
Dept: GASTROENTEROLOGY | Facility: CLINIC | Age: 48
End: 2018-02-20

## 2018-02-20 NOTE — TELEPHONE ENCOUNTER
I left a VM for Yisel in Re: to Mono's upcoming EGD with Dr. Howard.  I informed Yisel that I will be sending all instructions re: scheduling to Mono's mychart.  My direct line was also left in message for pt/wife to call with any questions or concerns.    SR 02/20/2018 @ 1149 A

## 2018-02-21 ENCOUNTER — DOCUMENTATION ONLY (OUTPATIENT)
Dept: CARE COORDINATION | Facility: CLINIC | Age: 48
End: 2018-02-21

## 2018-02-21 DIAGNOSIS — L29.9 ITCHING: ICD-10-CM

## 2018-02-21 DIAGNOSIS — F41.9 ANXIETY: ICD-10-CM

## 2018-02-21 DIAGNOSIS — R04.0 EPISTAXIS: Primary | ICD-10-CM

## 2018-02-21 RX ORDER — HYDROXYZINE HYDROCHLORIDE 25 MG/1
25 TABLET, FILM COATED ORAL 2 TIMES DAILY
Qty: 180 TABLET | Refills: 0 | Status: SHIPPED | OUTPATIENT
Start: 2018-02-21 | End: 2018-01-01

## 2018-02-21 RX ORDER — HYDROXYZINE HYDROCHLORIDE 25 MG/1
TABLET, FILM COATED ORAL
Qty: 180 TABLET | Refills: 1 | Status: CANCELLED | OUTPATIENT
Start: 2018-02-21

## 2018-02-21 NOTE — PROGRESS NOTES
Please get him into ENT ASAP re: nose bleeds--might need cautery. See if anyway to get into ENT this week yet.

## 2018-02-21 NOTE — TELEPHONE ENCOUNTER
atarax     Last Written Prescription Date:  9/18/17  Last Fill Quantity: 180   # refills: 1  Last Office Visit :2/9/18  Future Office visit: 3/20/18

## 2018-02-21 NOTE — PROGRESS NOTES
[facility] utilizes an encounter to take the place of a direct phone call to your office. Please take a moment to review the below request. Your reply to this encounter will act as a verbal OK of orders if requested below. Thank you.    Patient Update:    Client continues to have nose bleeds.  He has been using an OTC saline mist and client/spouse report that nose bleeds have been daily and typically last 35 to 40 minutes.  Spouse reports that these nose bleeds will gush blood. No improvement noted since starting saline mist use.       Client also reports dry cough has returned.  This is a very harsh cough. Client reports a small amount of blood in cough today.  Cough spells are occurring throughout the day.      Both cough and nose bleeds could affect clients ability to start working at [employer].  Client is very excited about starting to work and hopeful to start ASAP.      Please advise.    Angela Manley RN Case Manager  [phone]  [email or web address]

## 2018-02-27 ENCOUNTER — TRANSFERRED RECORDS (OUTPATIENT)
Dept: HEALTH INFORMATION MANAGEMENT | Facility: CLINIC | Age: 48
End: 2018-02-27

## 2018-02-28 DIAGNOSIS — R18.8 ASCITES: Primary | ICD-10-CM

## 2018-03-01 ENCOUNTER — OFFICE VISIT (OUTPATIENT)
Dept: NEUROLOGY | Facility: CLINIC | Age: 48
End: 2018-03-01
Payer: MEDICARE

## 2018-03-01 VITALS
HEIGHT: 70 IN | HEART RATE: 79 BPM | SYSTOLIC BLOOD PRESSURE: 124 MMHG | BODY MASS INDEX: 30.64 KG/M2 | WEIGHT: 214 LBS | DIASTOLIC BLOOD PRESSURE: 80 MMHG

## 2018-03-01 DIAGNOSIS — C71.3 OLIGOASTROCYTOMA OF PARIETAL LOBE (H): Chronic | ICD-10-CM

## 2018-03-01 DIAGNOSIS — G40.209 PARTIAL SYMPTOMATIC EPILEPSY WITH COMPLEX PARTIAL SEIZURES, NOT INTRACTABLE, WITHOUT STATUS EPILEPTICUS (H): ICD-10-CM

## 2018-03-01 DIAGNOSIS — G40.209 PARTIAL SYMPTOMATIC EPILEPSY WITH COMPLEX PARTIAL SEIZURES, NOT INTRACTABLE, WITHOUT STATUS EPILEPTICUS (H): Primary | ICD-10-CM

## 2018-03-01 LAB — PHENYTOIN SERPL-MCNC: 11.2 MG/L (ref 10–20)

## 2018-03-01 PROCEDURE — 80185 ASSAY OF PHENYTOIN TOTAL: CPT | Performed by: PSYCHIATRY & NEUROLOGY

## 2018-03-01 ASSESSMENT — PAIN SCALES - GENERAL: PAINLEVEL: NO PAIN (0)

## 2018-03-01 NOTE — LETTER
3/1/2018       RE: Mono Varghese  [address] [facility] [address]     Dear Colleague,    Thank you for referring your patient, Mono Varghese, to the [facility] at [facility]. Please see a copy of my visit note below.    Service Date: 2018       RE: Mono Varghese    MRN: [id number]   : 1970      Dear Pietro:      Mr. Varghese is 47 years old, accompanied by his mother-in-law, Faby, who was caring for him.  He is planning to start work at [employer], a few hours a day.  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  He had liver issues and hepatic encephalopathy and still is having paracenteses and remove fluid.  The oligoastrocytoma was on the left parietal lobe.  He has history of depression, malnutrition, portal vein thrombosis,  gastrointestinal bleeding, cirrhosis of the liver, thrombocytopenia.  We have managed his issues with phenytoin and he is currently on a dose which has been calculated as 120 twice a day.  Last concentration of phenytoin has been coming up as he had some seizures with speech arrest which are related to a low level.  Last phenytoin level at this dose was 9.4.  Because of the liver issue there may be some problems with stability of the level, so we will recheck it today.      Since last seen, he has had no episodes of seizures.  Fundus is difficult to visualize.  Today eye movements are full.  They almost are not a problem.  He does have liver flap in the right hand.  No weakness of the right side.  Coordination is good.  No sensory loss.  Reflexes are equal.        In summary, he is stable without seizures related to an oligoastrocytoma removed in the left parietal lobe.  He also has liver failure, hepatic cirrhosis and has had very low levels of phenytoin, which have  come up.  We will check his phenytoin level and give him a letter for his employer that he can perform a few hours a day at [employer] and something he is looking for to and there were instructions given.  If he should have a seizure he should be left to rest and his mother-in-law called and will pick him up.  We will see his concentration today.  We will okay his medication.         D: 2018   T: 2018   MT: ANGÉLICA      Name:     SALO MADERA   MRN:      [id number]        Account:      [id number]   :      1970           Service Date: 2018      Document: [id number]       Again, thank you for allowing me to participate in the care of your patient.      Sincerely,    Anil English MD    CC:  King Louis MD   Primary Care Center    [address] [place] [id number]

## 2018-03-01 NOTE — NURSING NOTE
Chief Complaint   Patient presents with     RECHECK     [facility] RETURN - SEIZURE     Socorro Paniagua MA

## 2018-03-01 NOTE — MR AVS SNAPSHOT
After Visit Summary   3/1/2018    Mono Varghese    MRN: [id number]           Patient Information     Date Of Birth          1970        Visit Information        Provider Department      3/1/2018 1:30 PM Anil English MD [facility] Neurology        Today's Diagnoses     Partial symptomatic epilepsy with complex partial seizures, not intractable, without status epilepticus (H)    -  1    Oligoastrocytoma of parietal lobe (H)          Care Instructions    To [place], [place]    This is to certify that Mr. Mono Varghese is able to do 3-4 hours of daily work and that he has been seizure free form over 6 months and we are expecting him to have seizures. should he have one--which are usually partial--just let him rest and call Faby Dale [phone] ( mon in law)     Anil English M.D.  [phone]          Follow-ups after your visit        Follow-up notes from your care team     Return in about 6 months (around 9/1/2018).      Your next 10 appointments already scheduled     Mar 01, 2018  2:30 PM CST   LAB with  LAB   [facility] Lab ([facility])    [address] [place] [address] [phone]           Please do not eat 10-12 hours before your appointment if you are coming in fasting for labs on lipids, cholesterol, or glucose (sugar). This does not apply to pregnant women. Water, hot tea and black coffee (with nothing added) are okay. Do not drink other fluids, diet soda or chew gum.            Mar 05, 2018   Procedure with Guru Jose Kelly MD   [facility] [place], Same Day Surgery (--)    [address] [facility] [address]   [phone]            Mar 07, 2018 11:45 AM CST   (Arrive by 11:30 AM)   NEW NOSE BLEED with Gertrude Minaya MD   [facility] Ear Nose and Throat ([facility])    [address] [place] [address]  [facility] [address] [phone]            Mar 07, 2018   2:20 PM CST   (Arrive by 2:05 PM)   Return Visit with Abdelrahman Narayan PA-C   [facility]astroenter[facility] ([facility])    [address] [place] [address]   [phone]            Mar 07, 2018  5:00 PM CST   (Arrive by 4:45 PM)   New Patient Visit with TIESHA Scanlon [facility] Colon and Rectal Surgery ([facility])    [address] [place] [address]   [phone]            Mar 20, 2018  9:30 AM CDT   (Arrive by 9:15 AM)   Return Visit with King Louis MD   [facility] Primary Care Clinic ([facility])    [address] [place] [address]   [phone]            Apr 17, 2018  9:00 AM CDT   Lab with  LAB   [facility] Lab ([facility])    [address] [place] [address]   [phone]            Apr 17, 2018  9:40 AM CDT   (Arrive by 9:25 AM)   Return General Liver with Beatriz Tanner MD   [facility] Hepatology ([facility])    [address] [place] [address]  [address]  [facility] [address]   [phone]            Apr 27, 2018 11:30 AM CDT   (Arrive by 11:15 AM)   MR BRAIN W/O & W CONTRAST with [facility] ([facility])    [address] [place] [address]   [phone]           Take your medicines as usual, unless your doctor tells you not to. Bring a list of your current medicines to your exam (including vitamins, minerals and over-the-counter drugs).  You may or may not receive intravenous (IV) contrast for this exam pending the discretion of the Radiologist.  You do not need to do anything special to prepare.  The MRI machine uses a strong magnet. Please wear clothes without metal (snaps, zippers). A sweatsuit works well, or we may give you a hospital gown.   Please remove any body piercings and hair extensions before you arrive. You will also remove watches, jewelry, hairpins, wallets, dentures, partial dental plates and hearing aids. You may wear contact lenses, and you may be able to wear your rings. We have a safe place to keep your personal items, but it is safer to leave them at home.  **IMPORTANT** THE INSTRUCTIONS BELOW ARE ONLY FOR THOSE PATIENTS WHO HAVE BEEN PRESCRIBED SEDATION OR GENERAL ANESTHESIA DURING THEIR MRI PROCEDURE:  IF YOUR DOCTOR PRESCRIBED ORAL SEDATION (take medicine to help you relax during your exam):   You must get the medicine from your doctor (oral medication) before you arrive. Bring the medicine to the exam. Do not take it at home. You ll be told when to take it upon arriving for your exam.   Arrive one hour early. Bring someone who can take you home after the test. Your medicine will make you sleepy. After the exam, you may not drive, take a bus or take a taxi by yourself.  IF YOUR DOCTOR PRESCRIBED IV SEDATION:   Arrive one hour early. Bring someone who can take you home after the test. Your medicine will make you sleepy. After the exam, you may not drive, take a bus or take a taxi by yourself.   No eating 6 hours before your exam. You may have clear liquids up until 4 hours before your exam. (Clear liquids include water, clear tea, black coffee and fruit juice without pulp.)  IF YOUR DOCTOR PRESCRIBED ANESTHESIA (be asleep for your exam):   Arrive 1 1/2 hours early. Bring someone who can take you home after the test. You may not drive, take a bus or take a taxi by yourself.   No eating 8 hours before your exam. You may have clear liquids up until 4 hours before your exam. (Clear liquids include water, clear tea, black coffee and fruit juice without pulp.)   You will spend four to five hours in the recovery room.  Please call the Imaging Department at your exam site with any questions.            May 04, 2018 11:00 AM CDT   (Arrive by  "10:45 AM)   Return Visit with Lauro Shaw MD   [facility] ([facility])    [address] [facility]  [address]  [facility] [address] [phone]              Future tests that were ordered for you today     Open Future Orders        Priority Expected Expires Ordered    Phenytoin level Routine  3/1/2019 3/1/2018     Paracentesis Routine  2/28/2019 2/28/2018            Who to contact     Please call your clinic at [phone] to:    Ask questions about your health    Make or cancel appointments    Discuss your medicines    Learn about your test results    Speak to your doctor            Additional Information About Your Visit        [email or web address] Information     [employer] gives you secure access to your electronic health record. If you see a primary care provider, you can also send messages to your care team and make appointments. If you have questions, please call your primary care clinic.  If you do not have a primary care provider, please call [phone] and they will assist you.      [employer] is an electronic gateway that provides easy, online access to your medical records. With [employer], you can request a clinic appointment, read your test results, renew a prescription or communicate with your care team.     To access your existing account, please contact your [facility] or call [phone] for assistance.        Care EveryWhere ID     This is your Care EveryWhere ID. This could be used by other organizations to access your [facility] medical records  [id number]        Your Vitals Were     Pulse Height BMI (Body Mass Index)             79 1.78 m (5' 10.08\") 30.64 kg/m2          Blood Pressure from Last 3 Encounters:   03/01/18 124/80   02/19/18 122/80   02/12/18 (!) 141/94    Weight from Last 3 Encounters:   03/01/18 97.1 kg (214 lb)   02/19/18 96.7 kg (213 lb 3 oz)   02/12/18 96.7 kg (213 lb 3 oz)                 Today's Medication Changes "          These changes are accurate as of 3/1/18  2:19 PM.  If you have any questions, ask your nurse or doctor.               These medicines have changed or have updated prescriptions.        Dose/Directions    cyanocobalamin 1000 MCG tablet   Commonly known as:  vitamin  B-12   This may have changed:  when to take this   Used for:  Alcoholic cirrhosis of liver with ascites (H)        Dose:  1000 mcg   1 tablet (1,000 mcg) by Per G Tube route daily   Quantity:  130 tablet   Refills:  2       dulaglutide 1.5 MG/0.5ML pen   Commonly known as:  TRULICITY   This may have changed:  additional instructions   Used for:  DM type 2, goal HbA1c 7%-8% (H)        Dose:  1.5 mg   Inject 1.5 mg Subcutaneous every 7 days   Quantity:  6 mL   Refills:  1       levothyroxine 88 MCG tablet   Commonly known as:  SYNTHROID/LEVOTHROID   This may have changed:  when to take this   Used for:  Hypothyroidism        Dose:  88 mcg   Take 1 tablet (88 mcg) by mouth daily   Quantity:  90 tablet   Refills:  3            Where to get your medicines      These medications were sent to [facility] [address] [place], [place] [address] [address] [address]     Phone:  [phone]     phenytoin 30 MG CR capsule                Primary Care Provider Office Phone # Fax #    King Louis [phone]       [address] [address]        Equal Access to Services     SAMUEL FAIR AH: Jennifer Thomas, waaxda lucarmina, qaybta kaalmada kamla, josue tamayo. So [facility] [phone].    ATENCIÓN: Si habla español, tiene a chiang disposición servicios gratuitos de asistencia lingüística. Manuelito al [phone].    We comply with applicable federal civil rights laws and [place] laws. We do not discriminate on the basis of race, color, national origin, age, disability, sex, sexual orientation, or gender identity.            Thank you!     Thank you for  choosing [facility] NEUROLOGY  for your care. Our goal is always to provide you with excellent care. Hearing back from our patients is one way we can continue to improve our services. Please take a few minutes to complete the written survey that you may receive in the mail after your visit with us. Thank you!             Your Updated Medication List - Protect others around you: Learn how to safely use, store and throw away your medicines at www.disposemymeds.org.          This list is accurate as of 3/1/18  2:19 PM.  Always use your most recent med list.                   Brand Name Dispense Instructions for use Diagnosis    blood glucose monitoring test strip    ONETOUCH ULTRA    300 each    Use to test blood sugars 4 times daily as needed or as directed.    Diabetes mellitus, type 2 (H)       Calcium Carb-Cholecalciferol 500-400 MG-UNIT Tabs    calcium 500 +D    90 tablet    Take 500 mg by mouth daily    Malnutrition (H)       CANE, ANY MATERIAL     1 each    One cane    Balance disorder       ciclopirox 0.77 % cream    LOPROX    90 g    Apply topically 2 times daily To feet and toenails.    Tinea pedis of both feet       cyanocobalamin 1000 MCG tablet    vitamin  B-12    130 tablet    1 tablet (1,000 mcg) by Per G Tube route daily    Alcoholic cirrhosis of liver with ascites (H)       dulaglutide 1.5 MG/0.5ML pen    TRULICITY    6 mL    Inject 1.5 mg Subcutaneous every 7 days    DM type 2, goal HbA1c 7%-8% (H)       ferrous sulfate 325 (65 FE) MG tablet    IRON    200 tablet    Take 1 tablet (325 mg) by mouth 2 times daily    Other iron deficiency anemia       gabapentin 100 MG capsule    NEURONTIN    30 capsule    Take 1 capsule (100 mg) by mouth At Bedtime    Mild episode of recurrent major depressive disorder (H)       glipiZIDE 10 MG 24 hr tablet    GLUCOTROL XL    90 tablet    Take 1 tablet (10 mg) by mouth daily    DM type 2, goal HbA1c 7%-8% (H)       HYDROcodone-acetaminophen 5-325 MG per tablet    NORCO     60 tablet    Take 2 tablets by mouth every 6 hours as needed for moderate to severe pain    Brain tumor (H)       hydrOXYzine 25 MG tablet    ATARAX    180 tablet    Take 1 tablet (25 mg) by mouth 2 times daily    Itching, Anxiety       lactulose 10 GM/15ML solution    CHRONULAC    473 mL    Take 7.5 mLs (5 g) by mouth 2 times daily    Hepatic encephalopathy (H)       levothyroxine 88 MCG tablet    SYNTHROID/LEVOTHROID    90 tablet    Take 1 tablet (88 mcg) by mouth daily    Hypothyroidism       lidocaine 4 % Crea cream    LMX4    133 g    Apply topically once as needed for mild pain    Port catheter in place       methylphenidate 10 MG tablet    RITALIN    60 tablet    10mg twice a day    Major depressive disorder with single episode, in partial remission (H)       mirtazapine 45 MG tablet    REMERON    30 tablet    Take 1 tablet (45 mg) by mouth At Bedtime    Major depressive disorder with single episode, in partial remission (H)       multivitamin, therapeutic with minerals Tabs tablet      Take 1 tablet by mouth 2 times daily        omeprazole 20 MG CR capsule    priLOSEC    360 capsule    Take 2 capsules (40 mg) by mouth 2 times daily    Gastroesophageal reflux disease without esophagitis       ONETOUCH LANCETS Misc     100 each    by In Vitro route 4 times daily as needed    Type II or unspecified type diabetes mellitus without mention of complication, not stated as uncontrolled       phenytoin 30 MG CR capsule    DILANTIN    720 capsule    Take 4 capsules (120 mg) by mouth 2 times daily    Oligoastrocytoma of parietal lobe (H)       pravastatin 80 MG tablet    PRAVACHOL    90 tablet    Take 1 tablet (80 mg) by mouth daily    High cholesterol       rifaximin 550 MG Tabs tablet    XIFAXAN    60 tablet    Take 1 tablet (550 mg) by mouth 2 times daily    Hepatic encephalopathy (H)       simethicone 80 MG chewable tablet    MYLICON    60 tablet    Take 1 tablet (80 mg) by mouth every 6 hours as needed for cramping     Abdominal cramping       sulfamethoxazole-trimethoprim 800-160 MG per tablet    BACTRIM DS/SEPTRA DS    30 tablet    Take 1 tablet by mouth daily    Spontaneous bacterial peritonitis (H)       thiamine 100 MG tablet     100 tablet    Take 1 tablet (100 mg) by mouth daily    Alcoholic cirrhosis of liver with ascites (H)       traZODone 50 MG tablet    DESYREL    30 tablet    Take 1 tablet (50 mg) by mouth nightly as needed for sleep    Primary insomnia

## 2018-03-01 NOTE — PROGRESS NOTES
Service Date: 2018      King Louis MD   Primary Care Center    [address] [place] [address]      RE: Mono Varhgese    MRN: [id number]   : 1970      Dear Pietro:      Mr. Varghese is 47 years old, accompanied by his mother-in-law, Faby, who was caring for him.  He is planning to start work at [employer], [employer], a few hours a day.  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  He had liver issues and hepatic encephalopathy and still is having paracenteses and remove fluid.  The oligoastrocytoma was on the left parietal lobe.  He has history of depression, malnutrition, portal vein thrombosis,  gastrointestinal bleeding, cirrhosis of the liver, thrombocytopenia.  We have managed his issues with phenytoin and he is currently on a dose which has been calculated as 120 twice a day.  Last concentration of phenytoin has been coming up as he had some seizures with speech arrest which are related to a low level.  Last phenytoin level at this dose was 9.4.  Because of the liver issue there may be some problems with stability of the level, so we will recheck it today.      Since last seen, he has had no episodes of seizures.  Fundus is difficult to visualize.  Today eye movements are full.  They almost are not a problem.  He does have liver flap in the right hand.  No weakness of the right side.  Coordination is good.  No sensory loss.  Reflexes are equal.        In summary, he is stable without seizures related to an oligoastrocytoma removed in the left parietal lobe.  He also has liver failure, hepatic cirrhosis and has had very low levels of phenytoin, which have come up.  We will check his phenytoin level and give him a letter for his employer that he can perform a few hours a day at [employer] and something he is looking for to and there  were instructions given.  If he should have a seizure he should be left to rest and his mother-in-law called and will pick him up.  We will see his concentration today.  We will okay his medication.      Sincerely,       MD SARA Romeo MD             D: 2018   T: 2018   MT: ANGÉLICA      Name:     SALO MADERA   MRN:      [id number]        Account:      [id number]   :      1970           Service Date: 2018      Document: [id number]

## 2018-03-01 NOTE — PATIENT INSTRUCTIONS
To [place], [place]    This is to certify that Mr. Mono Varghese is able to do 3-4 hours of daily work and that he has been seizure free form over 6 months and we are expecting him to have seizures. should he have one--which are usually partial--just let him rest and call Faby Dale [phone] ( mon in law)     Anil English M.D.  [phone]

## 2018-03-05 ENCOUNTER — HOSPITAL ENCOUNTER (OUTPATIENT)
Facility: CLINIC | Age: 48
Discharge: HOME | End: 2018-03-05
Attending: INTERNAL MEDICINE | Admitting: INTERNAL MEDICINE
Payer: MEDICARE

## 2018-03-05 ENCOUNTER — ANESTHESIA (OUTPATIENT)
Dept: SURGERY | Facility: CLINIC | Age: 48
End: 2018-03-05
Payer: MEDICARE

## 2018-03-05 ENCOUNTER — TELEPHONE (OUTPATIENT)
Dept: SURGERY | Facility: CLINIC | Age: 48
End: 2018-03-05

## 2018-03-05 ENCOUNTER — SURGERY (OUTPATIENT)
Age: 48
End: 2018-03-05

## 2018-03-05 ENCOUNTER — ANESTHESIA EVENT (OUTPATIENT)
Dept: SURGERY | Facility: CLINIC | Age: 48
End: 2018-03-05
Payer: MEDICARE

## 2018-03-05 VITALS
DIASTOLIC BLOOD PRESSURE: 101 MMHG | SYSTOLIC BLOOD PRESSURE: 150 MMHG | OXYGEN SATURATION: 97 % | RESPIRATION RATE: 16 BRPM | TEMPERATURE: 98.4 F | BODY MASS INDEX: 31.12 KG/M2 | HEIGHT: 70 IN | WEIGHT: 217.37 LBS

## 2018-03-05 LAB
GLUCOSE BLDC GLUCOMTR-MCNC: 59 MG/DL (ref 70–99)
GLUCOSE BLDC GLUCOMTR-MCNC: 72 MG/DL (ref 70–99)
GLUCOSE BLDC GLUCOMTR-MCNC: 78 MG/DL (ref 70–99)
GLUCOSE BLDC GLUCOMTR-MCNC: 95 MG/DL (ref 70–99)

## 2018-03-05 PROCEDURE — 25800025 ZZH RX 258: Performed by: ANESTHESIOLOGY

## 2018-03-05 PROCEDURE — 25000566 ZZH SEVOFLURANE, EA 15 MIN: Performed by: INTERNAL MEDICINE

## 2018-03-05 PROCEDURE — A9270 NON-COVERED ITEM OR SERVICE: HCPCS | Performed by: INTERNAL MEDICINE

## 2018-03-05 PROCEDURE — 25000128 H RX IP 250 OP 636: Performed by: NURSE ANESTHETIST, CERTIFIED REGISTERED

## 2018-03-05 PROCEDURE — 71000014 ZZH RECOVERY PHASE 1 LEVEL 2 FIRST HR: Performed by: INTERNAL MEDICINE

## 2018-03-05 PROCEDURE — 36000053 ZZH SURGERY LEVEL 2 EA 15 ADDTL MIN - UMMC: Performed by: INTERNAL MEDICINE

## 2018-03-05 PROCEDURE — 71000027 ZZH RECOVERY PHASE 2 EACH 15 MINS: Performed by: INTERNAL MEDICINE

## 2018-03-05 PROCEDURE — C9399 UNCLASSIFIED DRUGS OR BIOLOG: HCPCS | Performed by: NURSE ANESTHETIST, CERTIFIED REGISTERED

## 2018-03-05 PROCEDURE — 37000009 ZZH ANESTHESIA TECHNICAL FEE, EACH ADDTL 15 MIN: Performed by: INTERNAL MEDICINE

## 2018-03-05 PROCEDURE — 40000170 ZZH STATISTIC PRE-PROCEDURE ASSESSMENT II: Performed by: INTERNAL MEDICINE

## 2018-03-05 PROCEDURE — 27210995 ZZH RX 272: Performed by: INTERNAL MEDICINE

## 2018-03-05 PROCEDURE — 25000128 H RX IP 250 OP 636: Performed by: ANESTHESIOLOGY

## 2018-03-05 PROCEDURE — 37000008 ZZH ANESTHESIA TECHNICAL FEE, 1ST 30 MIN: Performed by: INTERNAL MEDICINE

## 2018-03-05 PROCEDURE — 25000125 ZZHC RX 250: Performed by: NURSE ANESTHETIST, CERTIFIED REGISTERED

## 2018-03-05 PROCEDURE — 25000125 ZZHC RX 250: Performed by: INTERNAL MEDICINE

## 2018-03-05 PROCEDURE — 27210794 ZZH OR GENERAL SUPPLY STERILE: Performed by: INTERNAL MEDICINE

## 2018-03-05 PROCEDURE — 36000051 ZZH SURGERY LEVEL 2 1ST 30 MIN - UMMC: Performed by: INTERNAL MEDICINE

## 2018-03-05 PROCEDURE — 82962 GLUCOSE BLOOD TEST: CPT

## 2018-03-05 PROCEDURE — 71000015 ZZH RECOVERY PHASE 1 LEVEL 2 EA ADDTL HR: Performed by: INTERNAL MEDICINE

## 2018-03-05 RX ORDER — ONDANSETRON 2 MG/ML
4 INJECTION INTRAMUSCULAR; INTRAVENOUS
Status: DISCONTINUED | OUTPATIENT
Start: 2018-03-05 | End: 2018-03-05 | Stop reason: HOSPADM

## 2018-03-05 RX ORDER — FENTANYL CITRATE 50 UG/ML
25-50 INJECTION, SOLUTION INTRAMUSCULAR; INTRAVENOUS
Status: DISCONTINUED | OUTPATIENT
Start: 2018-03-05 | End: 2018-03-05 | Stop reason: HOSPADM

## 2018-03-05 RX ORDER — ONDANSETRON 2 MG/ML
INJECTION INTRAMUSCULAR; INTRAVENOUS PRN
Status: DISCONTINUED | OUTPATIENT
Start: 2018-03-05 | End: 2018-03-05

## 2018-03-05 RX ORDER — SODIUM CHLORIDE, SODIUM LACTATE, POTASSIUM CHLORIDE, CALCIUM CHLORIDE 600; 310; 30; 20 MG/100ML; MG/100ML; MG/100ML; MG/100ML
INJECTION, SOLUTION INTRAVENOUS CONTINUOUS PRN
Status: DISCONTINUED | OUTPATIENT
Start: 2018-03-05 | End: 2018-03-05

## 2018-03-05 RX ORDER — SODIUM CHLORIDE, SODIUM LACTATE, POTASSIUM CHLORIDE, CALCIUM CHLORIDE 600; 310; 30; 20 MG/100ML; MG/100ML; MG/100ML; MG/100ML
INJECTION, SOLUTION INTRAVENOUS CONTINUOUS
Status: DISCONTINUED | OUTPATIENT
Start: 2018-03-05 | End: 2018-03-05 | Stop reason: HOSPADM

## 2018-03-05 RX ORDER — DEXAMETHASONE SODIUM PHOSPHATE 4 MG/ML
INJECTION, SOLUTION INTRA-ARTICULAR; INTRALESIONAL; INTRAMUSCULAR; INTRAVENOUS; SOFT TISSUE PRN
Status: DISCONTINUED | OUTPATIENT
Start: 2018-03-05 | End: 2018-03-05

## 2018-03-05 RX ORDER — LIDOCAINE HYDROCHLORIDE 20 MG/ML
INJECTION, SOLUTION INFILTRATION; PERINEURAL PRN
Status: DISCONTINUED | OUTPATIENT
Start: 2018-03-05 | End: 2018-03-05

## 2018-03-05 RX ORDER — FLUMAZENIL 0.1 MG/ML
0.2 INJECTION, SOLUTION INTRAVENOUS
Status: DISCONTINUED | OUTPATIENT
Start: 2018-03-05 | End: 2018-03-05 | Stop reason: HOSPADM

## 2018-03-05 RX ORDER — NALOXONE HYDROCHLORIDE 0.4 MG/ML
.1-.4 INJECTION, SOLUTION INTRAMUSCULAR; INTRAVENOUS; SUBCUTANEOUS
Status: DISCONTINUED | OUTPATIENT
Start: 2018-03-05 | End: 2018-03-05 | Stop reason: HOSPADM

## 2018-03-05 RX ORDER — LIDOCAINE 40 MG/G
CREAM TOPICAL
Status: DISCONTINUED | OUTPATIENT
Start: 2018-03-05 | End: 2018-03-05 | Stop reason: HOSPADM

## 2018-03-05 RX ORDER — PROPOFOL 10 MG/ML
INJECTION, EMULSION INTRAVENOUS PRN
Status: DISCONTINUED | OUTPATIENT
Start: 2018-03-05 | End: 2018-03-05

## 2018-03-05 RX ORDER — FENTANYL CITRATE 50 UG/ML
INJECTION, SOLUTION INTRAMUSCULAR; INTRAVENOUS PRN
Status: DISCONTINUED | OUTPATIENT
Start: 2018-03-05 | End: 2018-03-05

## 2018-03-05 RX ORDER — LABETALOL HYDROCHLORIDE 5 MG/ML
10 INJECTION, SOLUTION INTRAVENOUS
Status: DISCONTINUED | OUTPATIENT
Start: 2018-03-05 | End: 2018-03-05 | Stop reason: HOSPADM

## 2018-03-05 RX ORDER — ONDANSETRON 2 MG/ML
4 INJECTION INTRAMUSCULAR; INTRAVENOUS EVERY 30 MIN PRN
Status: DISCONTINUED | OUTPATIENT
Start: 2018-03-05 | End: 2018-03-05 | Stop reason: HOSPADM

## 2018-03-05 RX ORDER — DEXTROSE MONOHYDRATE 25 G/50ML
12.5 INJECTION, SOLUTION INTRAVENOUS ONCE
Status: COMPLETED | OUTPATIENT
Start: 2018-03-05 | End: 2018-03-05

## 2018-03-05 RX ORDER — ONDANSETRON 4 MG/1
4 TABLET, ORALLY DISINTEGRATING ORAL EVERY 30 MIN PRN
Status: DISCONTINUED | OUTPATIENT
Start: 2018-03-05 | End: 2018-03-05 | Stop reason: HOSPADM

## 2018-03-05 RX ORDER — HYDRALAZINE HYDROCHLORIDE 20 MG/ML
2.5-5 INJECTION INTRAMUSCULAR; INTRAVENOUS EVERY 10 MIN PRN
Status: DISCONTINUED | OUTPATIENT
Start: 2018-03-05 | End: 2018-03-05 | Stop reason: HOSPADM

## 2018-03-05 RX ADMIN — MIDAZOLAM 2 MG: 1 INJECTION INTRAMUSCULAR; INTRAVENOUS at 14:15

## 2018-03-05 RX ADMIN — FENTANYL CITRATE 25 MCG: 50 INJECTION, SOLUTION INTRAMUSCULAR; INTRAVENOUS at 15:47

## 2018-03-05 RX ADMIN — ROCURONIUM BROMIDE 20 MG: 10 INJECTION INTRAVENOUS at 14:45

## 2018-03-05 RX ADMIN — DEXTROSE MONOHYDRATE 12.5 ML: 25 INJECTION, SOLUTION INTRAVENOUS at 15:35

## 2018-03-05 RX ADMIN — DEXAMETHASONE SODIUM PHOSPHATE 4 MG: 4 INJECTION, SOLUTION INTRA-ARTICULAR; INTRALESIONAL; INTRAMUSCULAR; INTRAVENOUS; SOFT TISSUE at 14:47

## 2018-03-05 RX ADMIN — FENTANYL CITRATE 100 MCG: 50 INJECTION, SOLUTION INTRAMUSCULAR; INTRAVENOUS at 14:26

## 2018-03-05 RX ADMIN — SODIUM CHLORIDE, POTASSIUM CHLORIDE, SODIUM LACTATE AND CALCIUM CHLORIDE: 600; 310; 30; 20 INJECTION, SOLUTION INTRAVENOUS at 14:15

## 2018-03-05 RX ADMIN — WATER 100 ML: 100 IRRIGANT IRRIGATION at 14:43

## 2018-03-05 RX ADMIN — Medication 100 MG: at 14:26

## 2018-03-05 RX ADMIN — FENTANYL CITRATE 25 MCG: 50 INJECTION, SOLUTION INTRAMUSCULAR; INTRAVENOUS at 15:30

## 2018-03-05 RX ADMIN — ONDANSETRON 4 MG: 2 INJECTION INTRAMUSCULAR; INTRAVENOUS at 14:47

## 2018-03-05 RX ADMIN — PROPOFOL 170 MG: 10 INJECTION, EMULSION INTRAVENOUS at 14:26

## 2018-03-05 RX ADMIN — SUGAMMADEX 200 MG: 100 INJECTION, SOLUTION INTRAVENOUS at 14:57

## 2018-03-05 RX ADMIN — SIMETHICONE 133 MG: 63.3; 3.7 SOLUTION/ DROPS ORAL at 14:43

## 2018-03-05 RX ADMIN — LIDOCAINE HYDROCHLORIDE 100 MG: 20 INJECTION, SOLUTION INFILTRATION; PERINEURAL at 14:26

## 2018-03-05 ASSESSMENT — ENCOUNTER SYMPTOMS: SEIZURES: 1

## 2018-03-05 NOTE — IP AVS SNAPSHOT
Post Anesthesia Care Unit [address] [address]    Phone:  [phone]                                       After Visit Summary   3/5/2018    Mono Varghese    MRN: [id number]           After Visit Summary Signature Page     I have received my discharge instructions, and my questions have been answered. I have discussed any challenges I see with this plan with the nurse or doctor.    ..........................................................................................................................................  Patient/Patient Representative Signature      ..........................................................................................................................................  Patient Representative Print Name and Relationship to Patient    ..................................................               ................................................  Date                                            Time    ..........................................................................................................................................  Reviewed by Signature/Title    ...................................................              ..............................................  Date                                                            Time

## 2018-03-05 NOTE — DISCHARGE INSTRUCTIONS
..[facility], [place]  Same-Day Surgery   Adult Discharge Orders & Instructions     For 24 hours after surgery    1. Get plenty of rest.  A responsible adult must stay with you for at least 24 hours after you leave the hospital.   2. Do not drive or use heavy equipment.  If you have weakness or tingling, don't drive or use heavy equipment until this feeling goes away.  3. Do not drink alcohol.  4. Avoid strenuous or risky activities.  Ask for help when climbing stairs.   5. You may feel lightheaded.  IF so, sit for a few minutes before standing.  Have someone help you get up.   6. If you have nausea (feel sick to your stomach): Drink only clear liquids such as apple juice, ginger ale, broth or 7-Up.  Rest may also help.  Be sure to drink enough fluids.  Move to a regular diet as you feel able.  7. You may have a slight fever. Call the doctor if your fever is over 100 F (37.7 C) (taken under the tongue) or lasts longer than 24 hours.  8. You may have a dry mouth, a sore throat, muscle aches or trouble sleeping.  These should go away after 24 hours.  9. Do not make important or legal decisions.   Call your doctor for any of the followin.  Signs of infection (fever, growing tenderness at the surgery site, a large amount of drainage or bleeding, severe pain, foul-smelling drainage, redness, swelling).    2. It has been over 8 to 10 hours since surgery and you are still not able to urinate (pass water).    3.  Headache for over 24 hours.    4.  Numbness, tingling or weakness the day after surgery (if you had spinal anesthesia).  To contact a doctor, call ________________________________________ or:        [phone] and ask for the resident on call for   ______________________________________________ (answered 24 hours a day)      Emergency Department:    [facility]: [phone]       (TTY for hearing impaired: [phone])    [facility]: [phone]       (TTY for  hearing impaired: [phone])

## 2018-03-05 NOTE — ANESTHESIA CARE TRANSFER NOTE
Patient: Mono Varghese    Procedure(s):  Esophagogastroduodenoscopy  with banding of varices Latex Allergy  - Wound Class: II-Clean Contaminated    Diagnosis: Esophageal Varices   Diagnosis Additional Information: No value filed.    Anesthesia Type:   General, ETT     Note:  Airway :Nasal Cannula  Patient transferred to:PACU  Comments: Patient transferred to PACU spontaneously breathing.  VSS.  Report to RN. Handoff Report: Identifed the Patient, Identified the Reponsible Provider, Reviewed the pertinent medical history, Discussed the surgical course, Reviewed Intra-OP anesthesia mangement and issues during anesthesia, Set expectations for post-procedure period and Allowed opportunity for questions and acknowledgement of understanding      Vitals: (Last set prior to Anesthesia Care Transfer)    CRNA VITALS  3/5/2018 1434 - 3/5/2018 1512      3/5/2018             Pulse: 87    SpO2: 98 %                Electronically Signed By: TIESHA Shah CRNA  March 5, 2018  3:12 PM

## 2018-03-05 NOTE — BRIEF OP NOTE
[facility], [place]  Gastroenterology Brief Operative Note    Pre-operative diagnosis: Esophageal varices   Post-operative diagnosis Same   Procedure: EGD   Surgeon: Guru Robin MD   Assistants(s): Ari Giordano MD   Anesthesia: General endotracheal anesthesia   Estimated blood loss: Minimal    Total IV fluids: (See anesthesia record)   Blood transfusion: No transfusion was given during surgery   Total urine output: (See anesthesia record)   Drains: None   Specimens: None   Implants: 6 bands   Findings: Post-esophageal banding scars and one post-banding ulcer in the esophagus. Some esophageal varices remained. Portal hypertensive gastropathy. Two inflammatory gastric polyps at the gastric cardia. Normal duodenum. No ectopic varices.  Esophageal varices treated with band ligation. 6 bands placed.   Complications: None   Condition: Stable   Comments:      Recommendations:         See dictated procedure report for full details (found in chart review under 'Procedures')    - Observe in Same Day for possible discharge  - Discharge home if minimal or no pain   - Liquid diet today. Advance to soft diet tomorrow and day after. Can resume regular diet in 3 days.  - Repeat EGD in OR in 6 weeks     Ari Giordano MD  [phone]

## 2018-03-05 NOTE — TELEPHONE ENCOUNTER
Left message for pts wife explaining that pt is scheduled incorrectly with NP and needs to see surgeon. NP recommends Dr. Stevens. Left his schedulers contact information and explained that appt on 3/7/18 is cancelled. Idania FLORES LPN

## 2018-03-05 NOTE — ANESTHESIA PREPROCEDURE EVALUATION
Anesthesia Evaluation     . Pt has had prior anesthetic. Type: General    No history of anesthetic complications          ROS/MED HX    ENT/Pulmonary:     (+)sleep apnea, uses CPAP , . .    Neurologic:     (+)seizures features: Partial epilepsy with impairment of consciousness , other neuro ADHD (attention deficit hyperactivity disorder), Oligoastrocytoma of parietal lobe     Cardiovascular:     (+) Dyslipidemia, ----. : . . . :. .       METS/Exercise Tolerance:     Hematologic:     (+) Anemia, Other Hematologic Disorder-Thrombocytopenia,      Musculoskeletal:         GI/Hepatic:     (+) liver disease (Cirrhosis of liver), Other GI/Hepatic esophageal varices, Encephalopathy, hepatic       Renal/Genitourinary:         Endo:     (+) type II DM thyroid problem .   (-) Type I DM   Psychiatric:     (+) depression      Infectious Disease:         Malignancy:   (+) Malignancy History of Neuro  Neuro CA Active status post.          Other:    (+) H/O Chronic Pain,                   Physical Exam      Airway   Mallampati: II  TM distance: >3 FB  Neck ROM: full    Dental     Cardiovascular       Pulmonary                     Anesthesia Plan      History & Physical Review      ASA Status:  4 .        Plan for General and ETT with Intravenous and Propofol induction. Maintenance will be Balanced.      Additional equipment: 2nd IV      Postoperative Care  Postoperative pain management:  IV analgesics.  Plan for postoperative opioid use.    Consents  Anesthetic plan, risks, benefits and alternatives discussed with:  Patient..                          .

## 2018-03-05 NOTE — OR NURSING
Pt returned from surgery very hyper reflexive cough. Finally settled down with mod amts of blood tinged sputum..  BG correctted.. Pt with no c/o nausia or pain.. Sign out obtained.. Ready for 3C

## 2018-03-05 NOTE — ANESTHESIA POSTPROCEDURE EVALUATION
Patient: Mono Varghese    Procedure(s):  Esophagogastroduodenoscopy  with banding of varices Latex Allergy  - Wound Class: II-Clean Contaminated    Diagnosis:Esophageal Varices   Diagnosis Additional Information: No value filed.    Anesthesia Type:  General, ETT    Note:  Anesthesia Post Evaluation    Patient location during evaluation: PACU  Patient participation: Able to fully participate in evaluation  Level of consciousness: awake and alert  Pain management: adequate  Airway patency: patent  Cardiovascular status: acceptable  Respiratory status: acceptable  Hydration status: acceptable  PONV: none     Anesthetic complications: None          Last vitals:  Vitals:    03/05/18 1520 03/05/18 1530 03/05/18 1540   BP: (!) 142/104 (!) 149/91 147/90   Resp: 16 16 14   Temp:      SpO2: 97% 95% 98%         Electronically Signed By: Luna Boyer MD  March 5, 2018  4:03 PM

## 2018-03-05 NOTE — IP AVS SNAPSHOT
MRN:[id number]                      After Visit Summary   3/5/2018    Mono Varghese    MRN: [id number]           Thank you!     Thank you for choosing [facility] for your care. Our goal is always to provide you with excellent care. Hearing back from our patients is one way we can continue to improve our services. Please take a few minutes to complete the written survey that you may receive in the mail after you visit with us. Thank you!        Patient Information     Date Of Birth          1970        About your hospital stay     You were admitted on:  March 5, 2018 You last received care in the:  Post Anesthesia Care Unit [facility]    You were discharged on:  March 5, 2018       Who to Call     For medical emergencies, please call 911.  For non-urgent questions about your medical care, please call your primary care provider or clinic, [phone]  For questions related to your surgery, please call your surgery clinic        Attending Provider     Provider Guru Jose So MD Gastroenterology       Primary Care Provider Office Phone # Fax #    [place] [phone]      After Care Instructions     Discharge Instructions       No driving or operating machinery until the day after procedure.            Discharge Instructions       Recommend that a responsible adult remain with the patient at home for 24 hours post discharge.            Discharge Instructions       Start with clear liquids, sips of water 1 hour after procedure. If no abdominal pain and gag reflex has returned, advance as tolerated to pre-procedure diet.              Discharge Instructions       Restart home medications.            Discharge Instructions       Check with your Provider when to start anticoagulant medication.            Discharge Instructions       No ALCOHOL 24 hours post procedure.                  Your next 10 appointments already scheduled     Mar  07, 2018 11:45 AM CST   (Arrive by 11:30 AM)   NEW NOSE BLEED with Gertrude Minaya MD   [facility] Nose [facility] Throat ([facility])    [address] [place] [address]   [phone]            Mar 07, 2018  2:20 PM CST   (Arrive by 2:05 PM)   Return Visit with Abdelrahman Narayan PA-C   [facility] Gastroenterology [facility] IBD [facility] ([facility])    [address] [place] [address]   [phone]            Mar 20, 2018  9:30 AM CDT   (Arrive by 9:15 AM)   Return Visit with [place] MD   [facility] Primary Care Clinic ([facility])    [address] [place] [address]   [phone]            Apr 17, 2018  9:00 AM CDT   Lab with  LAB   [facility] Lab ([facility])    [address] [place] [address]   [phone]            Apr 17, 2018  9:40 AM CDT   (Arrive by 9:25 AM)   Return General Liver with Beatriz Tanner MD   [facility] Hepatology ([facility])    [address] [place] [address]  [address]  [facility] [address]   [phone]            Apr 27, 2018 11:30 AM CDT   (Arrive by 11:15 AM)   MR BRAIN W/O & W CONTRAST with [address] [facility] ([facility])    [address] [place] [address]   [phone]           Take your medicines as usual, unless your doctor tells you not to. Bring a list of your current medicines to your exam (including vitamins, minerals and over-the-counter drugs).  You may or may not receive intravenous (IV) contrast for this exam pending the discretion of the Radiologist.  You do not need to do anything special to prepare.  The MRI machine uses a strong magnet. Please wear clothes without metal (snaps, zippers). A sweatsuit works well, or we may give you a hospital gown.  Please remove any body  piercings and hair extensions before you arrive. You will also remove watches, jewelry, hairpins, wallets, dentures, partial dental plates and hearing aids. You may wear contact lenses, and you may be able to wear your rings. We have a safe place to keep your personal items, but it is safer to leave them at home.  **IMPORTANT** THE INSTRUCTIONS BELOW ARE ONLY FOR THOSE PATIENTS WHO HAVE BEEN PRESCRIBED SEDATION OR GENERAL ANESTHESIA DURING THEIR MRI PROCEDURE:  IF YOUR DOCTOR PRESCRIBED ORAL SEDATION (take medicine to help you relax during your exam):   You must get the medicine from your doctor (oral medication) before you arrive. Bring the medicine to the exam. Do not take it at home. You ll be told when to take it upon arriving for your exam.   Arrive one hour early. Bring someone who can take you home after the test. Your medicine will make you sleepy. After the exam, you may not drive, take a bus or take a taxi by yourself.  IF YOUR DOCTOR PRESCRIBED IV SEDATION:   Arrive one hour early. Bring someone who can take you home after the test. Your medicine will make you sleepy. After the exam, you may not drive, take a bus or take a taxi by yourself.   No eating 6 hours before your exam. You may have clear liquids up until 4 hours before your exam. (Clear liquids include water, clear tea, black coffee and fruit juice without pulp.)  IF YOUR DOCTOR PRESCRIBED ANESTHESIA (be asleep for your exam):   Arrive 1 1/2 hours early. Bring someone who can take you home after the test. You may not drive, take a bus or take a taxi by yourself.   No eating 8 hours before your exam. You may have clear liquids up until 4 hours before your exam. (Clear liquids include water, clear tea, black coffee and fruit juice without pulp.)   You will spend four to five hours in the recovery room.  Please call the Imaging Department at your exam site with any questions.            May 04, 2018 11:00 AM CDT   (Arrive by 10:45 AM)   Return Visit  with Lauro Shaw MD   [facility] ([facility])    [address]  [address]  [facility] [address] [phone]            May 21, 2018  9:30 AM CDT   Return Visit with [place] Qu[place] DPM   [facility] ([facility])    [address] [place] [address] [address]  [facility] [address] [phone]            Sep 04, 2018 12:30 PM CDT   (Arrive by 12:15 PM)   Return Seizure with Anil English MD   [facility] Neurology ([facility])    [address]  3rd Floor  [facility] [id number] [phone]              Further instructions from your care team       ..General acute hospital  Same-Day Surgery   Adult Discharge Orders & Instructions     For 24 hours after surgery    1. Get plenty of rest.  A responsible adult must stay with you for at least 24 hours after you leave the hospital.   2. Do not drive or use heavy equipment.  If you have weakness or tingling, don't drive or use heavy equipment until this feeling goes away.  3. Do not drink alcohol.  4. Avoid strenuous or risky activities.  Ask for help when climbing stairs.   5. You may feel lightheaded.  IF so, sit for a few minutes before standing.  Have someone help you get up.   6. If you have nausea (feel sick to your stomach): Drink only clear liquids such as apple juice, ginger ale, broth or 7-Up.  Rest may also help.  Be sure to drink enough fluids.  Move to a regular diet as you feel able.  7. You may have a slight fever. Call the doctor if your fever is over 100 F (37.7 C) (taken under the tongue) or lasts longer than 24 hours.  8. You may have a dry mouth, a sore throat, muscle aches or trouble sleeping.  These should go away after 24 hours.  9. Do not make important or legal decisions.   Call your doctor for any of the followin.  Signs of infection (fever, growing tenderness at the surgery site, a large amount of  "drainage or bleeding, severe pain, foul-smelling drainage, redness, swelling).    2. It has been over 8 to 10 hours since surgery and you are still not able to urinate (pass water).    3.  Headache for over 24 hours.    4.  Numbness, tingling or weakness the day after surgery (if you had spinal anesthesia).  To contact a doctor, call ________________________________________ or:        [phone] and ask for the resident on call for   ______________________________________________ (answered 24 hours a day)      Emergency Department:    [facility]: [phone]       (TTY for hearing impaired: [phone])    [facility]: [phone]       (TTY for hearing impaired: [phone])        Pending Results     No orders found from 3/3/2018 to 3/6/2018.            Admission Information     Date & Time Provider Department Dept. Phone    3/5/2018 Guru Jose Kelly MD Post Anesthesia Care Unit [facility] [phone]      Your Vitals Were     Blood Pressure Temperature Respirations Height Weight Pulse Oximetry    143/87 98  F (36.7  C) (Oral) 16 1.78 m (5' 10.08\") 98.6 kg (217 lb 6 oz) 96%    BMI (Body Mass Index)                   31.12 kg/m2           Schedule [facility] Information     [employer] gives you secure access to your electronic health record. If you see a primary care provider, you can also send messages to your care team and make appointments. If you have questions, please call your primary care clinic.  If you do not have a primary care provider, please call [phone] and they will assist you.        Care EveryWhere ID     This is your Care EveryWhere ID. This could be used by other organizations to access your [facility] medical records  [id number]        Equal Access to Services     SAMUEL FAIR AH: Jennifer Thomas, olesya martinez, tricia cason, josue tamayo. [facility] [phone].    ATENCIÓN: Si pam manzano, chemo a chiang " disposición servicios gratuitos de asistencia lingüística. Manuelito betancourt [phone].    We comply with applicable federal civil rights laws and [place] laws. We do not discriminate on the basis of race, color, national origin, age, disability, sex, sexual orientation, or gender identity.               Review of your medicines      CONTINUE these medicines which may have CHANGED, or have new prescriptions. If we are uncertain of the size of tablets/capsules you have at home, strength may be listed as something that might have changed.        Dose / Directions    cyanocobalamin 1000 MCG tablet   Commonly known as:  vitamin  B-12   This may have changed:  when to take this   Used for:  Alcoholic cirrhosis of liver with ascites (H)        Dose:  1000 mcg   1 tablet (1,000 mcg) by Per G Tube route daily   Quantity:  130 tablet   Refills:  2       dulaglutide 1.5 MG/0.5ML pen   Commonly known as:  TRULICITY   This may have changed:  additional instructions   Used for:  DM type 2, goal HbA1c 7%-8% (H)        Dose:  1.5 mg   Inject 1.5 mg Subcutaneous every 7 days   Quantity:  6 mL   Refills:  1       levothyroxine 88 MCG tablet   Commonly known as:  SYNTHROID/LEVOTHROID   This may have changed:  when to take this   Used for:  Hypothyroidism        Dose:  88 mcg   Take 1 tablet (88 mcg) by mouth daily   Quantity:  90 tablet   Refills:  3         CONTINUE these medicines which have NOT CHANGED        Dose / Directions    blood glucose monitoring test strip   Commonly known as:  ONETOUCH ULTRA   Used for:  Diabetes mellitus, type 2 (H)        Use to test blood sugars 4 times daily as needed or as directed.   Quantity:  300 each   Refills:  4       Calcium Carb-Cholecalciferol 500-400 MG-UNIT Tabs   Commonly known as:  calcium 500 +D   Used for:  Malnutrition (H)        Dose:  500 mg   Take 500 mg by mouth daily   Quantity:  90 tablet   Refills:  1       CANE, ANY MATERIAL   Used for:  Balance disorder        One cane    Quantity:  1 each   Refills:  0       ciclopirox 0.77 % cream   Commonly known as:  LOPROX   Used for:  Tinea pedis of both feet        Apply topically 2 times daily To feet and toenails.   Quantity:  90 g   Refills:  4       ferrous sulfate 325 (65 FE) MG tablet   Commonly known as:  IRON   Used for:  Other iron deficiency anemia        Dose:  1 tablet   Take 1 tablet (325 mg) by mouth 2 times daily   Quantity:  200 tablet   Refills:  3       gabapentin 100 MG capsule   Commonly known as:  NEURONTIN   Used for:  Mild episode of recurrent major depressive disorder (H)        Dose:  100 mg   Take 1 capsule (100 mg) by mouth At Bedtime   Quantity:  30 capsule   Refills:  3       glipiZIDE 10 MG 24 hr tablet   Commonly known as:  GLUCOTROL XL   Used for:  DM type 2, goal HbA1c 7%-8% (H)        Dose:  10 mg   Take 1 tablet (10 mg) by mouth daily   Quantity:  90 tablet   Refills:  1       HYDROcodone-acetaminophen 5-325 MG per tablet   Commonly known as:  NORCO   Used for:  Brain tumor (H)        Dose:  2 tablet   Take 2 tablets by mouth every 6 hours as needed for moderate to severe pain   Quantity:  60 tablet   Refills:  0       hydrOXYzine 25 MG tablet   Commonly known as:  ATARAX   Used for:  Itching, Anxiety        Dose:  25 mg   Take 1 tablet (25 mg) by mouth 2 times daily   Quantity:  180 tablet   Refills:  0       lactulose 10 GM/15ML solution   Commonly known as:  CHRONULAC   Used for:  Hepatic encephalopathy (H)        Dose:  5 g   Take 7.5 mLs (5 g) by mouth 2 times daily   Quantity:  473 mL   Refills:  1       lidocaine 4 % Crea cream   Commonly known as:  LMX4   Used for:  Port catheter in place        Apply topically once as needed for mild pain   Quantity:  133 g   Refills:  1       methylphenidate 10 MG tablet   Commonly known as:  RITALIN   Used for:  Major depressive disorder with single episode, in partial remission (H)        10mg twice a day   Quantity:  60 tablet   Refills:  0       mirtazapine 45  MG tablet   Commonly known as:  REMERON   Used for:  Major depressive disorder with single episode, in partial remission (H)        Dose:  45 mg   Take 1 tablet (45 mg) by mouth At Bedtime   Quantity:  30 tablet   Refills:  3       multivitamin, therapeutic with minerals Tabs tablet        Dose:  1 tablet   Take 1 tablet by mouth 2 times daily   Refills:  0       omeprazole 20 MG CR capsule   Commonly known as:  priLOSEC   Used for:  Gastroesophageal reflux disease without esophagitis        Dose:  40 mg   Take 2 capsules (40 mg) by mouth 2 times daily   Quantity:  360 capsule   Refills:  3       ONETOUCH LANCETS Misc   Used for:  Type II or unspecified type diabetes mellitus without mention of complication, not stated as uncontrolled        by In Vitro route 4 times daily as needed   Quantity:  100 each   Refills:  prn       phenytoin 30 MG CR capsule   Commonly known as:  DILANTIN   Used for:  Oligoastrocytoma of parietal lobe (H)        Dose:  120 mg   Take 4 capsules (120 mg) by mouth 2 times daily   Quantity:  720 capsule   Refills:  11       pravastatin 80 MG tablet   Commonly known as:  PRAVACHOL   Used for:  High cholesterol        Dose:  80 mg   Take 1 tablet (80 mg) by mouth daily   Quantity:  90 tablet   Refills:  3       rifaximin 550 MG Tabs tablet   Commonly known as:  XIFAXAN   Used for:  Hepatic encephalopathy (H)        Dose:  550 mg   Take 1 tablet (550 mg) by mouth 2 times daily   Quantity:  60 tablet   Refills:  11       simethicone 80 MG chewable tablet   Commonly known as:  MYLICON   Used for:  Abdominal cramping        Dose:  80 mg   Take 1 tablet (80 mg) by mouth every 6 hours as needed for cramping   Quantity:  60 tablet   Refills:  1       sulfamethoxazole-trimethoprim 800-160 MG per tablet   Commonly known as:  BACTRIM DS/SEPTRA DS   Used for:  Spontaneous bacterial peritonitis (H)        Dose:  1 tablet   Take 1 tablet by mouth daily   Quantity:  30 tablet   Refills:  5       thiamine 100  MG tablet   Used for:  Alcoholic cirrhosis of liver with ascites (H)        Dose:  100 mg   Take 1 tablet (100 mg) by mouth daily   Quantity:  100 tablet   Refills:  1       traZODone 50 MG tablet   Commonly known as:  DESYREL   Used for:  Primary insomnia        Dose:  50 mg   Take 1 tablet (50 mg) by mouth nightly as needed for sleep   Quantity:  30 tablet   Refills:  3                Protect others around you: Learn how to safely use, store and throw away your medicines at www.disposemymeds.org.             Medication List: This is a list of all your medications and when to take them. Check marks below indicate your daily home schedule. Keep this list as a reference.      Medications           Morning Afternoon Evening Bedtime As Needed    blood glucose monitoring test strip   Commonly known as:  ONETOUCH ULTRA   Use to test blood sugars 4 times daily as needed or as directed.                                Calcium Carb-Cholecalciferol 500-400 MG-UNIT Tabs   Commonly known as:  calcium 500 +D   Take 500 mg by mouth daily                                CANE, ANY MATERIAL   One cane                                ciclopirox 0.77 % cream   Commonly known as:  LOPROX   Apply topically 2 times daily To feet and toenails.                                cyanocobalamin 1000 MCG tablet   Commonly known as:  vitamin  B-12   1 tablet (1,000 mcg) by Per G Tube route daily                                dulaglutide 1.5 MG/0.5ML pen   Commonly known as:  TRULICITY   Inject 1.5 mg Subcutaneous every 7 days                                ferrous sulfate 325 (65 FE) MG tablet   Commonly known as:  IRON   Take 1 tablet (325 mg) by mouth 2 times daily                                gabapentin 100 MG capsule   Commonly known as:  NEURONTIN   Take 1 capsule (100 mg) by mouth At Bedtime                                glipiZIDE 10 MG 24 hr tablet   Commonly known as:  GLUCOTROL XL   Take 1 tablet (10 mg) by mouth daily                                 HYDROcodone-acetaminophen 5-325 MG per tablet   Commonly known as:  NORCO   Take 2 tablets by mouth every 6 hours as needed for moderate to severe pain                                hydrOXYzine 25 MG tablet   Commonly known as:  ATARAX   Take 1 tablet (25 mg) by mouth 2 times daily                                lactulose 10 GM/15ML solution   Commonly known as:  CHRONULAC   Take 7.5 mLs (5 g) by mouth 2 times daily                                levothyroxine 88 MCG tablet   Commonly known as:  SYNTHROID/LEVOTHROID   Take 1 tablet (88 mcg) by mouth daily                                lidocaine 4 % Crea cream   Commonly known as:  LMX4   Apply topically once as needed for mild pain                                methylphenidate 10 MG tablet   Commonly known as:  RITALIN   10mg twice a day                                mirtazapine 45 MG tablet   Commonly known as:  REMERON   Take 1 tablet (45 mg) by mouth At Bedtime                                multivitamin, therapeutic with minerals Tabs tablet   Take 1 tablet by mouth 2 times daily                                omeprazole 20 MG CR capsule   Commonly known as:  priLOSEC   Take 2 capsules (40 mg) by mouth 2 times daily                                ONETOUCH LANCETS Misc   by In Vitro route 4 times daily as needed                                phenytoin 30 MG CR capsule   Commonly known as:  DILANTIN   Take 4 capsules (120 mg) by mouth 2 times daily                                pravastatin 80 MG tablet   Commonly known as:  PRAVACHOL   Take 1 tablet (80 mg) by mouth daily                                rifaximin 550 MG Tabs tablet   Commonly known as:  XIFAXAN   Take 1 tablet (550 mg) by mouth 2 times daily                                simethicone 80 MG chewable tablet   Commonly known as:  MYLICON   Take 1 tablet (80 mg) by mouth every 6 hours as needed for cramping                                sulfamethoxazole-trimethoprim 800-160 MG per  tablet   Commonly known as:  BACTRIM DS/SEPTRA DS   Take 1 tablet by mouth daily                                thiamine 100 MG tablet   Take 1 tablet (100 mg) by mouth daily                                traZODone 50 MG tablet   Commonly known as:  DESYREL   Take 1 tablet (50 mg) by mouth nightly as needed for sleep

## 2018-03-06 ENCOUNTER — TRANSFERRED RECORDS (OUTPATIENT)
Dept: HEALTH INFORMATION MANAGEMENT | Facility: CLINIC | Age: 48
End: 2018-03-06

## 2018-03-06 DIAGNOSIS — K70.30 ALCOHOLIC CIRRHOSIS OF LIVER WITHOUT ASCITES (H): Primary | Chronic | ICD-10-CM

## 2018-03-06 LAB — UPPER GI ENDOSCOPY: NORMAL

## 2018-03-07 ENCOUNTER — CARE COORDINATION (OUTPATIENT)
Dept: GASTROENTEROLOGY | Facility: CLINIC | Age: 48
End: 2018-03-07

## 2018-03-07 ENCOUNTER — OFFICE VISIT (OUTPATIENT)
Dept: OTOLARYNGOLOGY | Facility: CLINIC | Age: 48
End: 2018-03-07
Payer: MEDICARE

## 2018-03-07 ENCOUNTER — OFFICE VISIT (OUTPATIENT)
Dept: GASTROENTEROLOGY | Facility: CLINIC | Age: 48
End: 2018-03-07
Payer: MEDICARE

## 2018-03-07 VITALS
SYSTOLIC BLOOD PRESSURE: 135 MMHG | TEMPERATURE: 97.9 F | HEIGHT: 70 IN | BODY MASS INDEX: 29.49 KG/M2 | HEART RATE: 71 BPM | WEIGHT: 206 LBS | OXYGEN SATURATION: 93 % | DIASTOLIC BLOOD PRESSURE: 85 MMHG

## 2018-03-07 VITALS — WEIGHT: 206 LBS | HEIGHT: 70 IN | BODY MASS INDEX: 29.49 KG/M2

## 2018-03-07 DIAGNOSIS — I85.00 ESOPHAGEAL VARICES (H): Primary | ICD-10-CM

## 2018-03-07 DIAGNOSIS — K21.9 GASTROESOPHAGEAL REFLUX DISEASE WITHOUT ESOPHAGITIS: Primary | ICD-10-CM

## 2018-03-07 DIAGNOSIS — R04.0 EPISTAXIS: Primary | ICD-10-CM

## 2018-03-07 RX ORDER — MUPIROCIN 20 MG/G
OINTMENT TOPICAL
Qty: 22 G | Refills: 0 | Status: SHIPPED | OUTPATIENT
Start: 2018-03-07 | End: 2018-01-01

## 2018-03-07 ASSESSMENT — ENCOUNTER SYMPTOMS
SYNCOPE: 0
HEADACHES: 0
DIARRHEA: 0
NERVOUS/ANXIOUS: 1
BOWEL INCONTINENCE: 0
INSOMNIA: 1
LOSS OF CONSCIOUSNESS: 0
MUSCLE WEAKNESS: 1
BACK PAIN: 1
DYSPNEA ON EXERTION: 1
LIGHT-HEADEDNESS: 1
FEVER: 0
CONSTIPATION: 0
DISTURBANCES IN COORDINATION: 1
DECREASED CONCENTRATION: 1
ARTHRALGIAS: 1
POLYPHAGIA: 0
MUSCLE CRAMPS: 1
SORE THROAT: 1
DEPRESSION: 1
BLOATING: 1
TASTE DISTURBANCE: 0
DECREASED APPETITE: 0
POLYDIPSIA: 1
WEAKNESS: 1
SNORES LOUDLY: 0
POOR WOUND HEALING: 0
VOMITING: 0
DIZZINESS: 1
WEIGHT LOSS: 0
PARALYSIS: 0
HEMOPTYSIS: 0
COUGH DISTURBING SLEEP: 1
NAIL CHANGES: 0
NAUSEA: 0
WHEEZING: 1
SMELL DISTURBANCE: 0
STIFFNESS: 1
TREMORS: 0
MEMORY LOSS: 1
WEIGHT GAIN: 0
TINGLING: 0
PANIC: 0
ORTHOPNEA: 0
SINUS CONGESTION: 1
SHORTNESS OF BREATH: 1
JAUNDICE: 0
HALLUCINATIONS: 0
LEG PAIN: 1
NIGHT SWEATS: 0
MYALGIAS: 1
TROUBLE SWALLOWING: 1
NECK PAIN: 1
FATIGUE: 1
NECK MASS: 0
HYPERTENSION: 0
BLOOD IN STOOL: 1
SWOLLEN GLANDS: 0
PALPITATIONS: 0
POSTURAL DYSPNEA: 1
ALTERED TEMPERATURE REGULATION: 1
ABDOMINAL PAIN: 1
SPUTUM PRODUCTION: 1
HEARTBURN: 1
EXERCISE INTOLERANCE: 1
SLEEP DISTURBANCES DUE TO BREATHING: 0
BRUISES/BLEEDS EASILY: 1
SKIN CHANGES: 0
INCREASED ENERGY: 1
SINUS PAIN: 0
JOINT SWELLING: 0
NUMBNESS: 0
CHILLS: 1
HOARSE VOICE: 1
SPEECH CHANGE: 1
COUGH: 1
SEIZURES: 0
RECTAL PAIN: 0
HYPOTENSION: 0

## 2018-03-07 ASSESSMENT — PAIN SCALES - GENERAL
PAINLEVEL: NO PAIN (0)
PAINLEVEL: NO PAIN (0)

## 2018-03-07 NOTE — PROGRESS NOTES
GI CLINIC VISIT    CC/REFERRING MD:  King Louis  REASON FOR CONSULTATION: GERD, rectal bleeding     ASSESSMENT/PLAN:  47-year-old male with history of brain tumor, diabetes mellitus, liver cirrhosis with meld of 12, history of esophageal varices, history of recent rectal bleeding, GERD.    1.  Rectal bleeding: Patient is well-established in the hepatology clinic and is closely monitored for esophageal varices with most recent found rectal varices.  Patient has yet to establish care with colorectal surgeon for appropriate management at this time but is next steps.  No evidence of inflammatory causes for rectal bleeding on recent flexible sigmoidoscopy.  --Colorectal surgery evaluation    2.  GERD: Currently stable on omeprazole 20 mg twice a day    Thank you for this consultation.  It was a pleasure to participate in the care of this patient; please contact us with any further questions.       Abdelrahman Narayan PA-C  Division of Gastroenterology, Hepatology and Nutrition  [facility]      HPI  47-year-old male with history of brain tumor, diabetes mellitus, liver cirrhosis with meld of 12, history of esophageal varices, history of recent rectal bleeding, GERD.  Today's visit was brief to discuss stability of GERD and diarrhea that has been caused by lactulose, and primarily for coordination of care to establish additional workup regarding potential rectal varices with colorectal surgery.  GERD symptoms are currently stable with omeprazole 20 mg twice daily without any breakthrough symptoms.  He has occasional diarrhea which is attributed to lactulose.  Regarding his rectal bleeding he was evaluated by colorectal surgery and was instructed to complete a flexible sigmoidoscopy during the time of his upper endoscopy for esophageal varices banding.  Flexible sigmoidoscopy revealed nonthrombosed external hemorrhoids found on perianal exam and prominent rectal veins with internal hemorrhoids. Hemoglobin  remained stable around 12 which has been consistent over the last year.  He denies any overt issues with constipation or significant diarrhea at this time.       PROBLEM LIST  Patient Active Problem List    Diagnosis Date Noted     Chronic pain 08/01/2016     Priority: High     Encephalopathy, hepatic (H) 07/29/2017     Priority: Medium     Hx of psychiatric care 10/05/2016     Priority: Medium     Past Psychotropic Medications:    Drug   Dose    (mg)   Full Trial    yes,no,unk   Helpful    yes,no,maybe   Adverse Effects    no, yes-list   Reason for DC     Duloxetine   90 mg   yes   yes   None reported   Currently taking     Bupropion   unknown   Yes   unknown   None reported   Lower seizure threshold     Venlafaxine   75 mg   yes   yes   None reported   Currently taking     Quetiapine   25 mg   no   unknown   thrombocytopenia (?)   myelosuppression     Valproic Acid   1,000 mg   unknown   unknown   None reported   Resolution of seizures                    Hyperlipidemia LDL goal <100 08/01/2016     Priority: Medium     Pancytopenia (H) 08/01/2016     Priority: Medium     Chronic pancytopenia secondary to liver disease since at least 2012       Malnutrition (H) 08/01/2016     Priority: Medium     Hospitalized in 12/2015 for variceal bleeding, subsequently diagnosed with SBP.  His hospital course was complicated by altered mental status and malnutrition, underwent placement of a PEG tube       Advance care planning 03/29/2016     Priority: Medium     Advance Care Planning 3/29/2016: Receipt of ACP document:  Received: POLST which was signed and dated by provider on 2/18/16.  Document previously scanned on 2/25/16.  Order reviewed and found to be valid.  Code Status needs to be updated to reflect choices in most recent ACP document. Notification sent to Dr. Louis for followup.  Also received valid POLST dated 7/16/13 and scanned on 7/29/13.  Confirmed/documented designated decision maker(s).  Added by Rayna WELLS  [facility] Advance Care Planning Liaison               Portal vein thrombosis 12/18/2015     Priority: Medium     history of left lower extremity deep vein thrombosis as well as portal vein and superior mesenteric vein thrombosis, late 2015 when he was hospitalized in the ICU and seriously ill  temporary IVC filter placed in 12/2015 when he was in the ICU with deep vein thromboses and active bleeding       GIB (gastrointestinal bleeding) 11/23/2015     Priority: Medium     Admitted to the ICU 11/2015 for hemorrhagic shock 2/2 variceal bleed with subsequent sequelae of shock liver, multi organ dysfunction including altered mental status of unknown etiology, multiple thrombosis, decompensated liver cirrhosis, hematologic abnormalities, and JOSEF  s/p banding at the end of 03/2016       Ringing in ears, unspecified laterality 08/26/2015     Priority: Medium     Myopic astigmatism of both eyes 07/06/2015     Priority: Medium     Presbyopia 07/06/2015     Priority: Medium     Cirrhosis of liver (H) 06/17/2015     Priority: Medium     cirrhosis secondary to alcohol, complicated by variceal bleeding and hepatic encephalopathy       Pulmonary nodules 06/17/2015     Priority: Medium     Partial epilepsy with impairment of consciousness (H) 05/07/2014     Priority: Medium     Class: Chronic     Thrombocytopenia (H) 07/25/2013     Priority: Medium     ADHD (attention deficit hyperactivity disorder) 07/05/2013     Priority: Medium     Financial difficulties 07/05/2013     Priority: Medium     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 status post subtotal resection by Dr. J Carlos Aranda in early 06/2013. Negative for 1p/19q deletions. S/P Concurrent chemoradiation July 15 through August 23, 2013. Initiated adjuvant oral  temozolomide 9/17/13; switched to IV temozolomide due to insurance coverage. Completed adjuvant temozolomide chemotherapy Feb 2014.       LENA (obstructive sleep apnea)-moderate (AHI 16) 12/18/2012     Priority: Medium     Polysomnogram 12/14/2012: 208.3 lbs.  Lowest oxygen saturation was 86.0%. Apnea/Hypopnea Index was 16.4 events per hour.  REM AHI is 20.7, supine AHI is 16.4.  RDI was 22       Type 2 diabetes mellitus (H) 10/03/2012     Priority: Medium     Moderate major depression (H) 10/03/2012     Priority: Medium     Hypothyroidism 08/01/2016     Priority: Low       PERTINENT PAST MEDICAL HISTORY:  Past Medical History:   Diagnosis Date     Brain tumor (H)      Cancer (H) 04/15/2013     Depressive disorder 02/01/2001     Diabetes (H) 04/01/2012     Liver failure (H)        PREVIOUS SURGERIES:  Past Surgical History:   Procedure Laterality Date     COLONOSCOPY N/A 11/27/2015    Procedure: COMBINED COLONOSCOPY, SINGLE OR MULTIPLE BIOPSY/POLYPECTOMY BY BIOPSY;  Surgeon: Ran Thurston MD;  Location: [facility]     ESOPHAGOSCOPY, GASTROSCOPY, DUODENOSCOPY (EGD), COMBINED N/A 11/23/2015    Procedure: COMBINED ESOPHAGOSCOPY, GASTROSCOPY, DUODENOSCOPY (EGD);  Surgeon: Rocael Rizvi MD;  Location: [facility]     ESOPHAGOSCOPY, GASTROSCOPY, DUODENOSCOPY (EGD), COMBINED N/A 1/25/2017    Procedure: COMBINED ESOPHAGOSCOPY, GASTROSCOPY, DUODENOSCOPY (EGD), BIOPSY SINGLE OR MULTIPLE;  Surgeon: Rocael Tejada MD;  Location:  GI     ESOPHAGOSCOPY, GASTROSCOPY, DUODENOSCOPY (EGD), COMBINED N/A 3/30/2017    Procedure: COMBINED ESOPHAGOSCOPY, GASTROSCOPY, DUODENOSCOPY (EGD);  Surgeon: Jordana Ramirez MD;  Location:  GI     ESOPHAGOSCOPY, GASTROSCOPY, DUODENOSCOPY (EGD), COMBINED N/A 1/19/2018    Procedure: COMBINED ESOPHAGOSCOPY, GASTROSCOPY, DUODENOSCOPY (EGD);  Upper Endoscopy with verceal banding; Flexible Sigmoidoscopy;  Surgeon: Guru Jose Kelly MD;  Location:  OR      ESOPHAGOSCOPY, GASTROSCOPY, DUODENOSCOPY (EGD), COMBINED N/A 2/12/2018    Procedure: COMBINED ESOPHAGOSCOPY, GASTROSCOPY, DUODENOSCOPY (EGD);  Esophagogastroduodenoscopy with variceal banding;  Surgeon: Guru Jose Kelly MD;  Location: [facility] OR     ESOPHAGOSCOPY, GASTROSCOPY, DUODENOSCOPY (EGD), COMBINED N/A 3/5/2018    Procedure: COMBINED ESOPHAGOSCOPY, GASTROSCOPY, DUODENOSCOPY (EGD);  Esophagogastroduodenoscopy  with banding of varices Latex Allergy ;  Surgeon: Guru Jose Kelly MD;  Location:  OR     OPTICAL TRACKING SYSTEM CRANIOTOMY, EXCISE TUMOR, COMBINED  6/6/2013    Procedure: COMBINED OPTICAL TRACKING SYSTEM CRANIOTOMY, EXCISE TUMOR;  Left Stealth Guided Craniotomy , Tumor Resection ;  Surgeon: J Carlos Aranda MD;  Location:  OR     ORTHOPEDIC SURGERY      hand surgery- right     SIGMOIDOSCOPY FLEXIBLE N/A 11/24/2015    Procedure: SIGMOIDOSCOPY FLEXIBLE;  Surgeon: Rocael Rizvi MD;  Location:  GI     SIGMOIDOSCOPY FLEXIBLE N/A 1/19/2018    Procedure: SIGMOIDOSCOPY FLEXIBLE;;  Surgeon: Guru Jose Kelly MD;  Location:  OR       ALLERGIES:     Allergies   Allergen Reactions     No Clinical Screening - See Comments      Coban and Surgilast cause itching     Tegaderm Transparent Dressing (Informational Only)      Latex Itching and Rash       PERTINENT MEDICATIONS:    Current Outpatient Prescriptions:      mupirocin (BACTROBAN) 2 % ointment, Use in nose 2-3 times per week, Disp: 22 g, Rfl: 0     phenytoin (DILANTIN) 30 MG CR capsule, Take 4 capsules (120 mg) by mouth 2 times daily, Disp: 720 capsule, Rfl: 11     hydrOXYzine (ATARAX) 25 MG tablet, Take 1 tablet (25 mg) by mouth 2 times daily, Disp: 180 tablet, Rfl: 0     ciclopirox (LOPROX) 0.77 % cream, Apply topically 2 times daily To feet and toenails., Disp: 90 g, Rfl: 4     simethicone (MYLICON) 80 MG chewable tablet, Take 1 tablet (80 mg) by mouth every 6 hours as needed for cramping,  Disp: 60 tablet, Rfl: 1     glipiZIDE (GLUCOTROL XL) 10 MG 24 hr tablet, Take 1 tablet (10 mg) by mouth daily, Disp: 90 tablet, Rfl: 1     sulfamethoxazole-trimethoprim (BACTRIM DS/SEPTRA DS) 800-160 MG per tablet, Take 1 tablet by mouth daily, Disp: 30 tablet, Rfl: 5     methylphenidate (RITALIN) 10 MG tablet, 10mg twice a day, Disp: 60 tablet, Rfl: 0     mirtazapine (REMERON) 45 MG tablet, Take 1 tablet (45 mg) by mouth At Bedtime, Disp: 30 tablet, Rfl: 3     traZODone (DESYREL) 50 MG tablet, Take 1 tablet (50 mg) by mouth nightly as needed for sleep, Disp: 30 tablet, Rfl: 3     gabapentin (NEURONTIN) 100 MG capsule, Take 1 capsule (100 mg) by mouth At Bedtime, Disp: 30 capsule, Rfl: 3     pravastatin (PRAVACHOL) 80 MG tablet, Take 1 tablet (80 mg) by mouth daily, Disp: 90 tablet, Rfl: 3     Calcium Carb-Cholecalciferol (CALCIUM 500 +D) 500-400 MG-UNIT TABS, Take 500 mg by mouth daily, Disp: 90 tablet, Rfl: 1     thiamine (VITAMIN B-1) 100 MG tablet, Take 1 tablet (100 mg) by mouth daily, Disp: 100 tablet, Rfl: 1     lidocaine (LMX4) 4 % CREA cream, Apply topically once as needed for mild pain, Disp: 133 g, Rfl: 1     CANE, ANY MATERIAL, One cane, Disp: 1 each, Rfl: 0     HYDROcodone-acetaminophen (NORCO) 5-325 MG per tablet, Take 2 tablets by mouth every 6 hours as needed for moderate to severe pain, Disp: 60 tablet, Rfl: 0     omeprazole (PRILOSEC) 20 MG CR capsule, Take 2 capsules (40 mg) by mouth 2 times daily, Disp: 360 capsule, Rfl: 3     dulaglutide (TRULICITY) 1.5 MG/0.5ML pen, Inject 1.5 mg Subcutaneous every 7 days (Patient taking differently: Inject 1.5 mg Subcutaneous every 7 days Tuesday), Disp: 6 mL, Rfl: 1     cyanocobalamin (VITAMIN  B-12) 1000 MCG tablet, 1 tablet (1,000 mcg) by Per G Tube route daily (Patient taking differently: 1,000 mcg by Per G Tube route every morning ), Disp: 130 tablet, Rfl: 2     rifaximin (XIFAXAN) 550 MG TABS tablet, Take 1 tablet (550 mg) by mouth 2 times daily, Disp:  "60 tablet, Rfl: 11     blood glucose monitoring (ONE TOUCH ULTRA) test strip, Use to test blood sugars 4 times daily as needed or as directed., Disp: 300 each, Rfl: 4     levothyroxine (SYNTHROID/LEVOTHROID) 88 MCG tablet, Take 1 tablet (88 mcg) by mouth daily (Patient taking differently: Take 88 mcg by mouth every morning ), Disp: 90 tablet, Rfl: 3     ferrous sulfate (IRON) 325 (65 FE) MG tablet, Take 1 tablet (325 mg) by mouth 2 times daily, Disp: 200 tablet, Rfl: 3     multivitamin, therapeutic with minerals (THERA-VIT-M) TABS, Take 1 tablet by mouth 2 times daily, Disp: , Rfl:      ONE TOUCH LANCETS MISC, by In Vitro route 4 times daily as needed, Disp: 100 each, Rfl: prn     lactulose (CHRONULAC) 10 GM/15ML solution, Take 7.5 mLs (5 g) by mouth 2 times daily, Disp: 473 mL, Rfl: 1    SOCIAL HISTORY:  Social History     Social History     Marital status:      Spouse name: Yisel      Number of children: 1      Years of education: N/A     Occupational History     [employer]       Social History Main Topics     Smoking status: Never Smoker     Smokeless tobacco: Never Used     Alcohol use No      Comment: Quit 01/2014     Drug use: No     Sexual activity: Yes     Partners: Female      Comment: not currently      Other Topics Concern     Parent/Sibling W/ Cabg, Mi Or Angioplasty Before 65f 55m? No     Social History Narrative    On disability        FAMILY HISTORY:  Family History   Problem Relation Age of Onset     Unknown/Adopted Mother        Past/family/social history reviewed and no changes    PHYSICAL EXAMINATION:  Constitutional: aaox3, cooperative, pleasant, not dyspneic/diaphoretic, no acute distress  Vitals reviewed: /85  Pulse 71  Temp 97.9  F (36.6  C)  Ht 1.778 m (5' 10\")  Wt 93.4 kg (206 lb)  SpO2 93%  BMI 29.56 kg/m2  Wt:   Wt Readings from Last 2 Encounters:   03/07/18 93.4 kg (206 lb)   03/07/18 93.4 kg (206 lb)      No additional exam was conducted at today's visit due to " coordination of care.    PERTINENT STUDIES:    Orders Only on 03/01/2018   Component Date Value Ref Range Status     Phenytoin Level 03/01/2018 11.2  10 - 20 mg/L Final         Answers for HPI/ROS submitted by the patient on 3/7/2018   General Symptoms: Yes  Skin Symptoms: Yes  HENT Symptoms: Yes  EYE SYMPTOMS: No  HEART SYMPTOMS: Yes  LUNG SYMPTOMS: Yes  INTESTINAL SYMPTOMS: Yes  URINARY SYMPTOMS: No  REPRODUCTIVE SYMPTOMS: No  SKELETAL SYMPTOMS: Yes  BLOOD SYMPTOMS: Yes  NERVOUS SYSTEM SYMPTOMS: Yes  MENTAL HEALTH SYMPTOMS: Yes  Fever: No  Loss of appetite: No  Weight loss: No  Weight gain: No  Fatigue: Yes  Night sweats: No  Chills: Yes  Increased stress: Yes  Excessive hunger: No  Excessive thirst: Yes  Feeling hot or cold when others believe the temperature is normal: Yes  Loss of height: No  Post-operative complications: No  Surgical site pain: Yes  Hallucinations: No  Change in or Loss of Energy: Yes  Hyperactivity: No  Confusion: Yes  Changes in hair: No  Changes in moles/birth marks: No  Itching: Yes  Rashes: No  Changes in nails: No  Acne: No  Change in facial hair: No  Warts: No  Non-healing sores: No  Scarring: Yes  Flaking of skin: No  Color changes of hands/feet in cold : No  Sun sensitivity: No  Skin thickening: No  Ear pain: No  Ear discharge: No  Hearing loss: Yes  Tinnitus: Yes  Nosebleeds: Yes  Congestion: Yes  Sinus pain: No  Trouble swallowing: Yes   Voice hoarseness: Yes  Mouth sores: No  Sore throat: Yes  Tooth pain: Yes  Gum tenderness: Yes  Bleeding gums: Yes  Change in taste: No  Change in sense of smell: No  Dry mouth: Yes  Hearing aid used: No  Neck lump: No  Cough: Yes  Sputum or phlegm: Yes  Coughing up blood: No  Difficulty breating or shortness of breath: Yes  Snoring: No  Wheezing: Yes  Difficulty breathing on exertion: Yes  Nighttime Cough: Yes  Difficulty breathing when lying flat: Yes  Chest pain or pressure: No  Fast or irregular heartbeat: No  Pain in legs with walking:  Yes  Trouble breathing while lying down: No  Fingers or toes appear blue: No  High blood pressure: No  Low blood pressure: No  Fainting: No  Murmurs: No  Pacemaker: No  Varicose veins: No  Edema or swelling: Yes  Wake up at night with shortness of breath: No  Light-headedness: Yes  Exercise intolerance: Yes  Heart burn or indigestion: Yes  Nausea: No  Vomiting: No  Abdominal pain: Yes  Bloating: Yes  Constipation: No  Diarrhea: No  Blood in stool: Yes  Black stools: No  Rectal or Anal pain: No  Fecal incontinence: No  Yellowing of skin or eyes: No  Vomit with blood: No  Change in stools: No  Back pain: Yes  Muscle aches: Yes  Neck pain: Yes  Swollen joints: No  Joint pain: Yes  Bone pain: No  Muscle cramps: Yes  Muscle weakness: Yes  Joint stiffness: Yes  Bone fracture: No  Anemia: Yes  Swollen glands: No  Easy bleeding or bruising: Yes  Trouble with coordination: Yes  Dizziness or trouble with balance: Yes  Fainting or black-out spells: No  Memory loss: Yes  Headache: No  Seizures: No  Speech problems: Yes  Tingling: No  Tremor: No  Weakness: Yes  Difficulty walking: Yes  Paralysis: No  Numbness: No  Nervous or Anxious: Yes  Depression: Yes  Trouble sleeping: Yes  Trouble thinking or concentrating: Yes  Mood changes: Yes  Panic attacks: No

## 2018-03-07 NOTE — LETTER
3/7/2018       RE: Mono Varghese  [address] [facility] [address]     Dear Colleague,    Thank you for referring your patient, Mono Varghese, to the [facility] at [facility]. Please see a copy of my visit note below.    GI CLINIC VISIT    CC/REFERRING MD:  King Louis  REASON FOR CONSULTATION: GERD, rectal bleeding     ASSESSMENT/PLAN:  47-year-old male with history of brain tumor, diabetes mellitus, liver cirrhosis with meld of 12, history of esophageal varices, history of recent rectal bleeding, GERD.    1.  Rectal bleeding: Patient is well-established in the hepatology clinic and is closely monitored for esophageal varices with most recent found rectal varices.  Patient has yet to establish care with colorectal surgeon for appropriate management at this time but is next steps.  No evidence of inflammatory causes for rectal bleeding on recent flexible sigmoidoscopy.  --Colorectal surgery evaluation    2.  GERD: Currently stable on omeprazole 20 mg twice a day    Thank you for this consultation.  It was a pleasure to participate in the care of this patient; please contact us with any further questions.       Abdelrahman Narayan PA-C  Division of Gastroenterology, Hepatology and Nutrition  [facility]      HPI  47-year-old male with history of brain tumor, diabetes mellitus, liver cirrhosis with meld of 12, history of esophageal varices, history of recent rectal bleeding, GERD.  Today's visit was brief to discuss stability of GERD and diarrhea that has been caused by lactulose, and primarily for coordination of care to establish additional workup regarding potential rectal varices with colorectal surgery.  GERD symptoms are currently stable with omeprazole 20 mg twice daily without any breakthrough symptoms.  He has occasional diarrhea which is attributed to lactulose.  Regarding his rectal bleeding he was evaluated by  colorectal surgery and was instructed to complete a flexible sigmoidoscopy during the time of his upper endoscopy for esophageal varices banding.  Flexible sigmoidoscopy revealed nonthrombosed external hemorrhoids found on perianal exam and prominent rectal veins with internal hemorrhoids. Hemoglobin remained stable around 12 which has been consistent over the last year.  He denies any overt issues with constipation or significant diarrhea at this time.       PROBLEM LIST  Patient Active Problem List    Diagnosis Date Noted     Chronic pain 08/01/2016     Priority: High     Encephalopathy, hepatic (H) 07/29/2017     Priority: Medium     Hx of psychiatric care 10/05/2016     Priority: Medium     Past Psychotropic Medications:    Drug   Dose    (mg)   Full Trial    yes,no,unk   Helpful    yes,no,maybe   Adverse Effects    no, yes-list   Reason for DC     Duloxetine   90 mg   yes   yes   None reported   Currently taking     Bupropion   unknown   Yes   unknown   None reported   Lower seizure threshold     Venlafaxine   75 mg   yes   yes   None reported   Currently taking     Quetiapine   25 mg   no   unknown   thrombocytopenia (?)   myelosuppression     Valproic Acid   1,000 mg   unknown   unknown   None reported   Resolution of seizures                    Hyperlipidemia LDL goal <100 08/01/2016     Priority: Medium     Pancytopenia (H) 08/01/2016     Priority: Medium     Chronic pancytopenia secondary to liver disease since at least 2012       Malnutrition (H) 08/01/2016     Priority: Medium     Hospitalized in 12/2015 for variceal bleeding, subsequently diagnosed with SBP.  His hospital course was complicated by altered mental status and malnutrition, underwent placement of a PEG tube       Advance care planning 03/29/2016     Priority: Medium     Advance Care Planning 3/29/2016: Receipt of ACP document:  Received: POLST which was signed and dated by provider on 2/18/16.  Document previously scanned on 2/25/16.  Order  reviewed and found to be valid.  Code Status needs to be updated to reflect choices in most recent ACP document. Notification sent to Dr. Louis for followup.  Also received valid POLST dated 7/16/13 and scanned on 7/29/13.  Confirmed/documented designated decision maker(s).  Added by Rayna Colorado , Advance Care Planning Liaison               Portal vein thrombosis 12/18/2015     Priority: Medium     history of left lower extremity deep vein thrombosis as well as portal vein and superior mesenteric vein thrombosis, late 2015 when he was hospitalized in the ICU and seriously ill  temporary IVC filter placed in 12/2015 when he was in the ICU with deep vein thromboses and active bleeding       GIB (gastrointestinal bleeding) 11/23/2015     Priority: Medium     Admitted to the ICU 11/2015 for hemorrhagic shock 2/2 variceal bleed with subsequent sequelae of shock liver, multi organ dysfunction including altered mental status of unknown etiology, multiple thrombosis, decompensated liver cirrhosis, hematologic abnormalities, and JOSEF  s/p banding at the end of 03/2016       Ringing in ears, unspecified laterality 08/26/2015     Priority: Medium     Myopic astigmatism of both eyes 07/06/2015     Priority: Medium     Presbyopia 07/06/2015     Priority: Medium     Cirrhosis of liver (H) 06/17/2015     Priority: Medium     cirrhosis secondary to alcohol, complicated by variceal bleeding and hepatic encephalopathy       Pulmonary nodules 06/17/2015     Priority: Medium     Partial epilepsy with impairment of consciousness (H) 05/07/2014     Priority: Medium     Class: Chronic     Thrombocytopenia (H) 07/25/2013     Priority: Medium     ADHD (attention deficit hyperactivity disorder) 07/05/2013     Priority: Medium     Financial difficulties 07/05/2013     Priority: Medium     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 Left parietal  oligoastrocytoma, WHO grade 3; predominantly astrocytic, and less than 10% of the specimen was oligodendroglioma. status post subtotal resection by Dr. J Carlos Aranda in early 06/2013. Negative for 1p/19q deletions. S/P Concurrent chemoradiation July 15 through August 23, 2013. Initiated adjuvant oral temozolomide 9/17/13; switched to IV temozolomide due to insurance coverage. Completed adjuvant temozolomide chemotherapy Feb 2014.       LENA (obstructive sleep apnea)-moderate (AHI 16) 12/18/2012     Priority: Medium     Polysomnogram 12/14/2012: 208.3 lbs.  Lowest oxygen saturation was 86.0%. Apnea/Hypopnea Index was 16.4 events per hour.  REM AHI is 20.7, supine AHI is 16.4.  RDI was 22       Type 2 diabetes mellitus (H) 10/03/2012     Priority: Medium     Moderate major depression (H) 10/03/2012     Priority: Medium     Hypothyroidism 08/01/2016     Priority: Low       PERTINENT PAST MEDICAL HISTORY:  Past Medical History:   Diagnosis Date     Brain tumor (H)      Cancer (H) 04/15/2013     Depressive disorder 02/01/2001     Diabetes (H) 04/01/2012     Liver failure (H)        PREVIOUS SURGERIES:  Past Surgical History:   Procedure Laterality Date     COLONOSCOPY N/A 11/27/2015    Procedure: COMBINED COLONOSCOPY, SINGLE OR MULTIPLE BIOPSY/POLYPECTOMY BY BIOPSY;  Surgeon: Ran Thurston MD;  Location:  GI     ESOPHAGOSCOPY, GASTROSCOPY, DUODENOSCOPY (EGD), COMBINED N/A 11/23/2015    Procedure: COMBINED ESOPHAGOSCOPY, GASTROSCOPY, DUODENOSCOPY (EGD);  Surgeon: Rocael Rizvi MD;  Location:  OR     ESOPHAGOSCOPY, GASTROSCOPY, DUODENOSCOPY (EGD), COMBINED N/A 1/25/2017    Procedure: COMBINED ESOPHAGOSCOPY, GASTROSCOPY, DUODENOSCOPY (EGD), BIOPSY SINGLE OR MULTIPLE;  Surgeon: Rocael Tejada MD;  Location:  GI     ESOPHAGOSCOPY, GASTROSCOPY, DUODENOSCOPY (EGD), COMBINED N/A 3/30/2017    Procedure: COMBINED ESOPHAGOSCOPY, GASTROSCOPY, DUODENOSCOPY (EGD);  Surgeon: Jordana Ramirez MD;   Location: [facility] GI     ESOPHAGOSCOPY, GASTROSCOPY, DUODENOSCOPY (EGD), COMBINED N/A 1/19/2018    Procedure: COMBINED ESOPHAGOSCOPY, GASTROSCOPY, DUODENOSCOPY (EGD);  Upper Endoscopy with verceal banding; Flexible Sigmoidoscopy;  Surgeon: Guru Jose Kelly MD;  Location: [facility] OR     ESOPHAGOSCOPY, GASTROSCOPY, DUODENOSCOPY (EGD), COMBINED N/A 2/12/2018    Procedure: COMBINED ESOPHAGOSCOPY, GASTROSCOPY, DUODENOSCOPY (EGD);  Esophagogastroduodenoscopy with variceal banding;  Surgeon: Guru Jose Kelly MD;  Location: [facility] OR     ESOPHAGOSCOPY, GASTROSCOPY, DUODENOSCOPY (EGD), COMBINED N/A 3/5/2018    Procedure: COMBINED ESOPHAGOSCOPY, GASTROSCOPY, DUODENOSCOPY (EGD);  Esophagogastroduodenoscopy  with banding of varices Latex Allergy ;  Surgeon: Guru Jose Kelly MD;  Location:  OR     OPTICAL TRACKING SYSTEM CRANIOTOMY, EXCISE TUMOR, COMBINED  6/6/2013    Procedure: COMBINED OPTICAL TRACKING SYSTEM CRANIOTOMY, EXCISE TUMOR;  Left Stealth Guided Craniotomy , Tumor Resection ;  Surgeon: J Carlos Aranda MD;  Location:  OR     ORTHOPEDIC SURGERY      hand surgery- right     SIGMOIDOSCOPY FLEXIBLE N/A 11/24/2015    Procedure: SIGMOIDOSCOPY FLEXIBLE;  Surgeon: Rocael Rizvi MD;  Location:  GI     SIGMOIDOSCOPY FLEXIBLE N/A 1/19/2018    Procedure: SIGMOIDOSCOPY FLEXIBLE;;  Surgeon: Guru Jose Kelly MD;  Location:  OR       ALLERGIES:     Allergies   Allergen Reactions     No Clinical Screening - See Comments      Coban and Surgilast cause itching     Tegaderm Transparent Dressing (Informational Only)      Latex Itching and Rash       PERTINENT MEDICATIONS:    Current Outpatient Prescriptions:      mupirocin (BACTROBAN) 2 % ointment, Use in nose 2-3 times per week, Disp: 22 g, Rfl: 0     phenytoin (DILANTIN) 30 MG CR capsule, Take 4 capsules (120 mg) by mouth 2 times daily, Disp: 720 capsule, Rfl: 11     hydrOXYzine (ATARAX) 25 MG tablet, Take  1 tablet (25 mg) by mouth 2 times daily, Disp: 180 tablet, Rfl: 0     ciclopirox (LOPROX) 0.77 % cream, Apply topically 2 times daily To feet and toenails., Disp: 90 g, Rfl: 4     simethicone (MYLICON) 80 MG chewable tablet, Take 1 tablet (80 mg) by mouth every 6 hours as needed for cramping, Disp: 60 tablet, Rfl: 1     glipiZIDE (GLUCOTROL XL) 10 MG 24 hr tablet, Take 1 tablet (10 mg) by mouth daily, Disp: 90 tablet, Rfl: 1     sulfamethoxazole-trimethoprim (BACTRIM DS/SEPTRA DS) 800-160 MG per tablet, Take 1 tablet by mouth daily, Disp: 30 tablet, Rfl: 5     methylphenidate (RITALIN) 10 MG tablet, 10mg twice a day, Disp: 60 tablet, Rfl: 0     mirtazapine (REMERON) 45 MG tablet, Take 1 tablet (45 mg) by mouth At Bedtime, Disp: 30 tablet, Rfl: 3     traZODone (DESYREL) 50 MG tablet, Take 1 tablet (50 mg) by mouth nightly as needed for sleep, Disp: 30 tablet, Rfl: 3     gabapentin (NEURONTIN) 100 MG capsule, Take 1 capsule (100 mg) by mouth At Bedtime, Disp: 30 capsule, Rfl: 3     pravastatin (PRAVACHOL) 80 MG tablet, Take 1 tablet (80 mg) by mouth daily, Disp: 90 tablet, Rfl: 3     Calcium Carb-Cholecalciferol (CALCIUM 500 +D) 500-400 MG-UNIT TABS, Take 500 mg by mouth daily, Disp: 90 tablet, Rfl: 1     thiamine (VITAMIN B-1) 100 MG tablet, Take 1 tablet (100 mg) by mouth daily, Disp: 100 tablet, Rfl: 1     lidocaine (LMX4) 4 % CREA cream, Apply topically once as needed for mild pain, Disp: 133 g, Rfl: 1     CANE, ANY MATERIAL, One cane, Disp: 1 each, Rfl: 0     HYDROcodone-acetaminophen (NORCO) 5-325 MG per tablet, Take 2 tablets by mouth every 6 hours as needed for moderate to severe pain, Disp: 60 tablet, Rfl: 0     omeprazole (PRILOSEC) 20 MG CR capsule, Take 2 capsules (40 mg) by mouth 2 times daily, Disp: 360 capsule, Rfl: 3     dulaglutide (TRULICITY) 1.5 MG/0.5ML pen, Inject 1.5 mg Subcutaneous every 7 days (Patient taking differently: Inject 1.5 mg Subcutaneous every 7 days Tuesday), Disp: 6 mL, Rfl: 1      cyanocobalamin (VITAMIN  B-12) 1000 MCG tablet, 1 tablet (1,000 mcg) by Per G Tube route daily (Patient taking differently: 1,000 mcg by Per G Tube route every morning ), Disp: 130 tablet, Rfl: 2     rifaximin (XIFAXAN) 550 MG TABS tablet, Take 1 tablet (550 mg) by mouth 2 times daily, Disp: 60 tablet, Rfl: 11     blood glucose monitoring (ONE TOUCH ULTRA) test strip, Use to test blood sugars 4 times daily as needed or as directed., Disp: 300 each, Rfl: 4     levothyroxine (SYNTHROID/LEVOTHROID) 88 MCG tablet, Take 1 tablet (88 mcg) by mouth daily (Patient taking differently: Take 88 mcg by mouth every morning ), Disp: 90 tablet, Rfl: 3     ferrous sulfate (IRON) 325 (65 FE) MG tablet, Take 1 tablet (325 mg) by mouth 2 times daily, Disp: 200 tablet, Rfl: 3     multivitamin, therapeutic with minerals (THERA-VIT-M) TABS, Take 1 tablet by mouth 2 times daily, Disp: , Rfl:      ONE TOUCH LANCETS MISC, by In Vitro route 4 times daily as needed, Disp: 100 each, Rfl: prn     lactulose (CHRONULAC) 10 GM/15ML solution, Take 7.5 mLs (5 g) by mouth 2 times daily, Disp: 473 mL, Rfl: 1    SOCIAL HISTORY:  Social History     Social History     Marital status:      Spouse name: Yisel      Number of children: 1      Years of education: N/A     Occupational History            Social History Main Topics     Smoking status: Never Smoker     Smokeless tobacco: Never Used     Alcohol use No      Comment: Quit 01/2014     Drug use: No     Sexual activity: Yes     Partners: Female      Comment: not currently      Other Topics Concern     Parent/Sibling W/ Cabg, Mi Or Angioplasty Before 65f 55m? No     Social History Narrative    On disability        FAMILY HISTORY:  Family History   Problem Relation Age of Onset     Unknown/Adopted Mother        Past/family/social history reviewed and no changes    PHYSICAL EXAMINATION:  Constitutional: aaox3, cooperative, pleasant, not dyspneic/diaphoretic, no acute distress  Vitals  "reviewed: /85  Pulse 71  Temp 97.9  F (36.6  C)  Ht 1.778 m (5' 10\")  Wt 93.4 kg (206 lb)  SpO2 93%  BMI 29.56 kg/m2  Wt:   Wt Readings from Last 2 Encounters:   03/07/18 93.4 kg (206 lb)   03/07/18 93.4 kg (206 lb)      No additional exam was conducted at today's visit due to coordination of care.    PERTINENT STUDIES:    Sincerely,    Abdelrahman Narayan PA-C    "

## 2018-03-07 NOTE — PATIENT INSTRUCTIONS
Plan of care:  Use saline spray 2-3 times/day  Mupirocin ointment 2-3 times/week    When bleeding:  -OK to use Afrin spray  -OK to use the nose clip  -OK to use bleeding strips    Clinic contact information:  1. To schedule an appointment call [phone], option 1  2. To talk to the Triage RN call [phone], option 3  3. If you need to speak to Idania or get a message to your doctor on a Friday, call the triage RN  4. IdaniaRN: [phone]  5. Surgery scheduling:      Jane Whelan: [phone]      Shoshana Wing: [phone]  6. Fax: [phone]  7. [phone]

## 2018-03-07 NOTE — MR AVS SNAPSHOT
After Visit Summary   3/7/2018    Mono Varghese    MRN: [id number]           Patient Information     Date Of Birth          1970        Visit Information        Provider Department      3/7/2018 11:45 AM Gertrude Minaya MD [facility] Nose [facility] Throat        Today's Diagnoses     Epistaxis    -  1      Care Instructions    Plan of care:  Use saline spray 2-3 times/day  Mupirocin ointment 2-3 times/week    When bleeding:  -OK to use Afrin spray  -OK to use the nose clip  -OK to use bleeding strips    Clinic contact information:  1. To schedule an appointment call [phone], option 1  2. To talk to the Triage RN call [phone], option 3  3. If you need to speak to Idanai or get a message to your doctor on a Friday, call the triage RN  4. IdaniaRN: [phone]  5. Surgery scheduling:      Jane Whelan: [phone]      Shoshana Wing: [phone]  6. Fax: [phone]  7. [phone]            Follow-ups after your visit        Your next 10 appointments already scheduled     Mar 07, 2018  2:20 PM CST   (Arrive by 2:05 PM)   Return Visit with Abdelrahman Narayan PA-C   [facility] ([facility])    [address] [place] [address] [phone]            Mar 20, 2018  9:30 AM CDT   (Arrive by 9:15 AM)   Return Visit with King Louis MD   [facility] ([facility])    [address] [place] [address] [phone]            2018  9:00 AM CDT   Lab with  LAB   [facility] Lab ([facility])    [address] [place] [address] [phone]            2018  9:40 AM CDT   (Arrive by 9:25 AM)   Return General Liver with Beatriz Tanner MD   [facility] Hepatology ([facility])    [address] [place] [address]    [phone]            Apr 27, 2018 11:30 AM CDT   (Arrive by 11:15 AM)   MR BRAIN W/O & W CONTRAST with [address] [facility] MRI ([facility])    [address] [facility]  [address] [facility] [address] [phone]           Take your medicines as usual, unless your doctor tells you not to. Bring a list of your current medicines to your exam (including vitamins, minerals and over-the-counter drugs).  You may or may not receive intravenous (IV) contrast for this exam pending the discretion of the Radiologist.  You do not need to do anything special to prepare.  The MRI machine uses a strong magnet. Please wear clothes without metal (snaps, zippers). A sweatsuit works well, or we may give you a hospital gown.  Please remove any body piercings and hair extensions before you arrive. You will also remove watches, jewelry, hairpins, wallets, dentures, partial dental plates and hearing aids. You may wear contact lenses, and you may be able to wear your rings. We have a safe place to keep your personal items, but it is safer to leave them at home.  **IMPORTANT** THE INSTRUCTIONS BELOW ARE ONLY FOR THOSE PATIENTS WHO HAVE BEEN PRESCRIBED SEDATION OR GENERAL ANESTHESIA DURING THEIR MRI PROCEDURE:  IF YOUR DOCTOR PRESCRIBED ORAL SEDATION (take medicine to help you relax during your exam):   You must get the medicine from your doctor (oral medication) before you arrive. Bring the medicine to the exam. Do not take it at home. You ll be told when to take it upon arriving for your exam.   Arrive one hour early. Bring someone who can take you home after the test. Your medicine will make you sleepy. After the exam, you may not drive, take a bus or take a taxi by yourself.  IF YOUR DOCTOR PRESCRIBED IV SEDATION:   Arrive one hour early. Bring someone who can take you home after the test. Your medicine will make you sleepy. After the exam, you may not drive, take a bus or take a taxi by  yourself.   No eating 6 hours before your exam. You may have clear liquids up until 4 hours before your exam. (Clear liquids include water, clear tea, black coffee and fruit juice without pulp.)  IF YOUR DOCTOR PRESCRIBED ANESTHESIA (be asleep for your exam):   Arrive 1 1/2 hours early. Bring someone who can take you home after the test. You may not drive, take a bus or take a taxi by yourself.   No eating 8 hours before your exam. You may have clear liquids up until 4 hours before your exam. (Clear liquids include water, clear tea, black coffee and fruit juice without pulp.)   You will spend four to five hours in the recovery room.  Please call the Imaging Department at your exam site with any questions.            May 04, 2018 11:00 AM CDT   (Arrive by 10:45 AM)   Return Visit with Lauro Shaw MD   [facility] ([facility])    [address] [id number] [phone]            May 21, 2018  9:30 AM CDT   Return Visit with King Quiles DPM   [facility] ([facility])    [id number] [place] [id number] [phone]            Sep 04, 2018 12:30 PM CDT   (Arrive by 12:15 PM)   Return Seizure with Anil English MD   [facility] Neurology ([facility])    [address] [place] [address]  3rd Floor  [facility] [id number] [phone]              Future tests that were ordered for you today     Open Future Orders        Priority Expected Expires Ordered    UPPER GI ENDOSCOPY Routine  4/21/2018 3/7/2018    Hepatic panel Routine 4/17/2018 3/6/2019 3/6/2018    Basic metabolic panel Routine 4/17/2018 3/6/2019 3/6/2018    INR Routine 4/17/2018 3/6/2019 3/6/2018    CBC with platelets Routine 4/17/2018 3/6/2019 3/6/2018            Who to contact     Please call your clinic at [phone] to:    Ask questions about your health    Make or cancel appointments    Discuss your  "medicines    Learn about your test results    Speak to your doctor            Additional Information About Your Visit        Webcrumbzhart Information     [employer] gives you secure access to your electronic health record. If you see a primary care provider, you can also send messages to your care team and make appointments. If you have questions, please call your primary care clinic.  If you do not have a primary care provider, please call [phone] and they will assist you.      [employer] is an electronic gateway that provides easy, online access to your medical records. With [employer], you can request a clinic appointment, read your test results, renew a prescription or communicate with your care team.     To access your existing account, please contact your [facility] or call [phone] for assistance.        Care EveryWhere ID     This is your Care EveryWhere ID. This could be used by other organizations to access your [place] medical records  [id number]        Your Vitals Were     Height BMI (Body Mass Index)                1.78 m (5' 10.08\") 29.49 kg/m2           Blood Pressure from Last 3 Encounters:   03/05/18 (!) 150/101   03/01/18 124/80   02/19/18 122/80    Weight from Last 3 Encounters:   03/07/18 93.4 kg (206 lb)   03/05/18 98.6 kg (217 lb 6 oz)   03/01/18 97.1 kg (214 lb)              Today, you had the following     No orders found for display         Today's Medication Changes          These changes are accurate as of 3/7/18 12:00 PM.  If you have any questions, ask your nurse or doctor.               Start taking these medicines.        Dose/Directions    mupirocin 2 % ointment   Commonly known as:  BACTROBAN   Used for:  Epistaxis   Started by:  Gertrude Minaya MD        Use in nose 2-3 times per week   Quantity:  22 g   Refills:  0         These medicines have changed or have updated prescriptions.        Dose/Directions    cyanocobalamin 1000 MCG tablet   Commonly " known as:  vitamin  B-12   This may have changed:  when to take this   Used for:  Alcoholic cirrhosis of liver with ascites (H)        Dose:  1000 mcg   1 tablet (1,000 mcg) by Per G Tube route daily   Quantity:  130 tablet   Refills:  2       dulaglutide 1.5 MG/0.5ML pen   Commonly known as:  TRULICITY   This may have changed:  additional instructions   Used for:  DM type 2, goal HbA1c 7%-8% (H)        Dose:  1.5 mg   Inject 1.5 mg Subcutaneous every 7 days   Quantity:  6 mL   Refills:  1       levothyroxine 88 MCG tablet   Commonly known as:  SYNTHROID/LEVOTHROID   This may have changed:  when to take this   Used for:  Hypothyroidism        Dose:  88 mcg   Take 1 tablet (88 mcg) by mouth daily   Quantity:  90 tablet   Refills:  3            Where to get your medicines      These medications were sent to [facility] [address] [place] [address] [id number] [facility] [address]     Phone:  [phone]     mupirocin 2 % ointment                Primary Care Provider Office Phone # Fax #    King Louis [phone]        [address] [address]        Equal Access to Services     SAMUEL FAIR AH: Hadii kevin lirao Sojoali, waaxda luqadaha, qaybta kaalmada adeegyada, josue tamayo. So [facility] [phone].    ATENCIÓN: Si habla español, tiene a chiang disposición servicios gratuitos de asistencia lingüística. Manuelito al [phone].    We comply with applicable federal civil rights laws and [place] laws. We do not discriminate on the basis of race, color, national origin, age, disability, sex, sexual orientation, or gender identity.            Thank you!     Thank you for choosing [facility] E[facility]SE [facility] THROAT  for your care. Our goal is always to provide you with excellent care. Hearing back from our patients is one way we can continue to improve our services. Please take a few minutes to complete the written survey that you may  receive in the mail after your visit with us. Thank you!             Your Updated Medication List - Protect others around you: Learn how to safely use, store and throw away your medicines at www.disposemymeds.org.          This list is accurate as of 3/7/18 12:00 PM.  Always use your most recent med list.                   Brand Name Dispense Instructions for use Diagnosis    blood glucose monitoring test strip    ONETOUCH ULTRA    300 each    Use to test blood sugars 4 times daily as needed or as directed.    Diabetes mellitus, type 2 (H)       Calcium Carb-Cholecalciferol 500-400 MG-UNIT Tabs    calcium 500 +D    90 tablet    Take 500 mg by mouth daily    Malnutrition (H)       CANE, ANY MATERIAL     1 each    One cane    Balance disorder       ciclopirox 0.77 % cream    LOPROX    90 g    Apply topically 2 times daily To feet and toenails.    Tinea pedis of both feet       cyanocobalamin 1000 MCG tablet    vitamin  B-12    130 tablet    1 tablet (1,000 mcg) by Per G Tube route daily    Alcoholic cirrhosis of liver with ascites (H)       dulaglutide 1.5 MG/0.5ML pen    TRULICITY    6 mL    Inject 1.5 mg Subcutaneous every 7 days    DM type 2, goal HbA1c 7%-8% (H)       ferrous sulfate 325 (65 FE) MG tablet    IRON    200 tablet    Take 1 tablet (325 mg) by mouth 2 times daily    Other iron deficiency anemia       gabapentin 100 MG capsule    NEURONTIN    30 capsule    Take 1 capsule (100 mg) by mouth At Bedtime    Mild episode of recurrent major depressive disorder (H)       glipiZIDE 10 MG 24 hr tablet    GLUCOTROL XL    90 tablet    Take 1 tablet (10 mg) by mouth daily    DM type 2, goal HbA1c 7%-8% (H)       HYDROcodone-acetaminophen 5-325 MG per tablet    NORCO    60 tablet    Take 2 tablets by mouth every 6 hours as needed for moderate to severe pain    Brain tumor (H)       hydrOXYzine 25 MG tablet    ATARAX    180 tablet    Take 1 tablet (25 mg) by mouth 2 times daily    Itching, Anxiety       lactulose 10  GM/15ML solution    CHRONULAC    473 mL    Take 7.5 mLs (5 g) by mouth 2 times daily    Hepatic encephalopathy (H)       levothyroxine 88 MCG tablet    SYNTHROID/LEVOTHROID    90 tablet    Take 1 tablet (88 mcg) by mouth daily    Hypothyroidism       lidocaine 4 % Crea cream    LMX4    133 g    Apply topically once as needed for mild pain    Port catheter in place       methylphenidate 10 MG tablet    RITALIN    60 tablet    10mg twice a day    Major depressive disorder with single episode, in partial remission (H)       mirtazapine 45 MG tablet    REMERON    30 tablet    Take 1 tablet (45 mg) by mouth At Bedtime    Major depressive disorder with single episode, in partial remission (H)       multivitamin, therapeutic with minerals Tabs tablet      Take 1 tablet by mouth 2 times daily        mupirocin 2 % ointment    BACTROBAN    22 g    Use in nose 2-3 times per week    Epistaxis       omeprazole 20 MG CR capsule    priLOSEC    360 capsule    Take 2 capsules (40 mg) by mouth 2 times daily    Gastroesophageal reflux disease without esophagitis       ONETOUCH LANCETS Misc     100 each    by In Vitro route 4 times daily as needed    Type II or unspecified type diabetes mellitus without mention of complication, not stated as uncontrolled       phenytoin 30 MG CR capsule    DILANTIN    720 capsule    Take 4 capsules (120 mg) by mouth 2 times daily    Oligoastrocytoma of parietal lobe (H)       pravastatin 80 MG tablet    PRAVACHOL    90 tablet    Take 1 tablet (80 mg) by mouth daily    High cholesterol       rifaximin 550 MG Tabs tablet    XIFAXAN    60 tablet    Take 1 tablet (550 mg) by mouth 2 times daily    Hepatic encephalopathy (H)       simethicone 80 MG chewable tablet    MYLICON    60 tablet    Take 1 tablet (80 mg) by mouth every 6 hours as needed for cramping    Abdominal cramping       sulfamethoxazole-trimethoprim 800-160 MG per tablet    BACTRIM DS/SEPTRA DS    30 tablet    Take 1 tablet by mouth daily     Spontaneous bacterial peritonitis (H)       thiamine 100 MG tablet     100 tablet    Take 1 tablet (100 mg) by mouth daily    Alcoholic cirrhosis of liver with ascites (H)       traZODone 50 MG tablet    DESYREL    30 tablet    Take 1 tablet (50 mg) by mouth nightly as needed for sleep    Primary insomnia

## 2018-03-07 NOTE — PROGRESS NOTES
Otolaryngology Adult Consultation    Patient: Mono Varghese   : 1970     Patient presents with:  Consult:   Chief Complaint   Patient presents with     Consult     epistaxis per Rajinder        Referring Provider: King Louis   Consulting Physician:  Gertrude Minaya MD       Assessment/Plan: No lesions to cauterize.  Recommend aggressive moisturization, saline spray, mupirocin ointment to prevent crusting.  Will provide instructions to use Afrin, nose clips and nose bleed strips purchased by wife.     HPI: Mono Varghese is a 47 year old male seen today in the Otolaryngology Clinic seen on consultation from Dr. Louis for a history of recurrent epistaxis.  H/O liver failure.  S/P excision of brain tumor.  Blows nose hard to clear crusting, that is when he bleeds.  Now using a humidifier and saline.  Wants to go back to work, so wants to prevent and know how to deal with nose bleeds.    Current Outpatient Prescriptions on File Prior to Visit:  phenytoin (DILANTIN) 30 MG CR capsule Take 4 capsules (120 mg) by mouth 2 times daily   hydrOXYzine (ATARAX) 25 MG tablet Take 1 tablet (25 mg) by mouth 2 times daily   ciclopirox (LOPROX) 0.77 % cream Apply topically 2 times daily To feet and toenails.   simethicone (MYLICON) 80 MG chewable tablet Take 1 tablet (80 mg) by mouth every 6 hours as needed for cramping   glipiZIDE (GLUCOTROL XL) 10 MG 24 hr tablet Take 1 tablet (10 mg) by mouth daily   sulfamethoxazole-trimethoprim (BACTRIM DS/SEPTRA DS) 800-160 MG per tablet Take 1 tablet by mouth daily   methylphenidate (RITALIN) 10 MG tablet 10mg twice a day   mirtazapine (REMERON) 45 MG tablet Take 1 tablet (45 mg) by mouth At Bedtime   traZODone (DESYREL) 50 MG tablet Take 1 tablet (50 mg) by mouth nightly as needed for sleep   gabapentin (NEURONTIN) 100 MG capsule Take 1 capsule (100 mg) by mouth At Bedtime   pravastatin (PRAVACHOL) 80 MG tablet Take 1 tablet (80 mg) by mouth daily   Calcium  Carb-Cholecalciferol (CALCIUM 500 +D) 500-400 MG-UNIT TABS Take 500 mg by mouth daily   thiamine (VITAMIN B-1) 100 MG tablet Take 1 tablet (100 mg) by mouth daily   lidocaine (LMX4) 4 % CREA cream Apply topically once as needed for mild pain   CANE, ANY MATERIAL One cane   HYDROcodone-acetaminophen (NORCO) 5-325 MG per tablet Take 2 tablets by mouth every 6 hours as needed for moderate to severe pain   omeprazole (PRILOSEC) 20 MG CR capsule Take 2 capsules (40 mg) by mouth 2 times daily   dulaglutide (TRULICITY) 1.5 MG/0.5ML pen Inject 1.5 mg Subcutaneous every 7 days (Patient taking differently: Inject 1.5 mg Subcutaneous every 7 days Tuesday)   cyanocobalamin (VITAMIN  B-12) 1000 MCG tablet 1 tablet (1,000 mcg) by Per G Tube route daily (Patient taking differently: 1,000 mcg by Per G Tube route every morning )   lactulose (CHRONULAC) 10 GM/15ML solution Take 7.5 mLs (5 g) by mouth 2 times daily   rifaximin (XIFAXAN) 550 MG TABS tablet Take 1 tablet (550 mg) by mouth 2 times daily   blood glucose monitoring (ONE TOUCH ULTRA) test strip Use to test blood sugars 4 times daily as needed or as directed.   levothyroxine (SYNTHROID/LEVOTHROID) 88 MCG tablet Take 1 tablet (88 mcg) by mouth daily (Patient taking differently: Take 88 mcg by mouth every morning )   ferrous sulfate (IRON) 325 (65 FE) MG tablet Take 1 tablet (325 mg) by mouth 2 times daily   multivitamin, therapeutic with minerals (THERA-VIT-M) TABS Take 1 tablet by mouth 2 times daily   ONE TOUCH LANCETS MISC by In Vitro route 4 times daily as needed     No current facility-administered medications on file prior to visit.        Allergies   Allergen Reactions     No Clinical Screening - See Comments      Coban and Surgilast cause itching     Tegaderm Transparent Dressing (Informational Only)      Latex Itching and Rash       Past Medical History:   Diagnosis Date     Brain tumor (H)      Cancer (H) 04/15/2013     Depressive disorder 02/01/2001     Diabetes  (H) 04/01/2012     Liver failure (H)          Review of Systems  [facility] ROS 3/7/2018   Constitutional Appetite change, Problems with sleep   Neurology Dizzy spells, Unexplained weakness, Headache   Psychology Frequently feeling depressed or sad   Ears, Nose, Throat Sore throat   Cardiopulmonary Cough, Breathing problems, Wheezing   Gastrointestinal/Genitourinary Diarrhea   Musculoskeletal Sore or stiff joints, Swollen legs/feet   Allergy/Immunology Skin changes   Hematologic Bleeding problems        14 point ROS neg other than the symptoms noted above.    Physical Exam:    General Assessment   The patient is alert, oriented and in no acute distress.   Head/Face/Scalp  Grossly normal.   Nose  External nose is straight, skin is normal. Intranasal exam (anterior rhinoscopy) reveals caudal septal perforation with minimal crusting.  No obvious vessels to cauterize.

## 2018-03-07 NOTE — LETTER
3/7/2018       RE: Mono Varghese  [address] [place] [address]     Dear Colleague,    Thank you for referring your patient, Mono Varghese, to the [facility] EAR NOSE AND THROAT at [facility]. Please see a copy of my visit note below.    Otolaryngology Adult Consultation    Patient: Mono Varghese   : 1970     Patient presents with:  Consult:   Chief Complaint   Patient presents with     Consult     epistaxis per Rajinder        Referring Provider: King Louis   Consulting Physician:  Gertrude Minaya MD       Assessment/Plan: No lesions to cauterize.  Recommend aggressive moisturization, saline spray, mupirocin ointment to prevent crusting.  Will provide instructions to use Afrin, nose clips and nose bleed strips purchased by wife.     HPI: Mono Varghese is a 47 year old male seen today in the Otolaryngology Clinic seen on consultation from Dr. Louis for a history of recurrent epistaxis.  H/O liver failure.  S/P excision of brain tumor.  Blows nose hard to clear crusting, that is when he bleeds.  Now using a humidifier and saline.  Wants to go back to work, so wants to prevent and know how to deal with nose bleeds.    Current Outpatient Prescriptions on File Prior to Visit:  phenytoin (DILANTIN) 30 MG CR capsule Take 4 capsules (120 mg) by mouth 2 times daily   hydrOXYzine (ATARAX) 25 MG tablet Take 1 tablet (25 mg) by mouth 2 times daily   ciclopirox (LOPROX) 0.77 % cream Apply topically 2 times daily To feet and toenails.   simethicone (MYLICON) 80 MG chewable tablet Take 1 tablet (80 mg) by mouth every 6 hours as needed for cramping   glipiZIDE (GLUCOTROL XL) 10 MG 24 hr tablet Take 1 tablet (10 mg) by mouth daily   sulfamethoxazole-trimethoprim (BACTRIM DS/SEPTRA DS) 800-160 MG per tablet Take 1 tablet by mouth daily   methylphenidate (RITALIN) 10 MG tablet 10mg twice a day   mirtazapine (REMERON) 45 MG tablet Take 1 tablet (45 mg) by mouth At  Bedtime   traZODone (DESYREL) 50 MG tablet Take 1 tablet (50 mg) by mouth nightly as needed for sleep   gabapentin (NEURONTIN) 100 MG capsule Take 1 capsule (100 mg) by mouth At Bedtime   pravastatin (PRAVACHOL) 80 MG tablet Take 1 tablet (80 mg) by mouth daily   Calcium Carb-Cholecalciferol (CALCIUM 500 +D) 500-400 MG-UNIT TABS Take 500 mg by mouth daily   thiamine (VITAMIN B-1) 100 MG tablet Take 1 tablet (100 mg) by mouth daily   lidocaine (LMX4) 4 % CREA cream Apply topically once as needed for mild pain   CANE, ANY MATERIAL One cane   HYDROcodone-acetaminophen (NORCO) 5-325 MG per tablet Take 2 tablets by mouth every 6 hours as needed for moderate to severe pain   omeprazole (PRILOSEC) 20 MG CR capsule Take 2 capsules (40 mg) by mouth 2 times daily   dulaglutide (TRULICITY) 1.5 MG/0.5ML pen Inject 1.5 mg Subcutaneous every 7 days (Patient taking differently: Inject 1.5 mg Subcutaneous every 7 days Tuesday)   cyanocobalamin (VITAMIN  B-12) 1000 MCG tablet 1 tablet (1,000 mcg) by Per G Tube route daily (Patient taking differently: 1,000 mcg by Per G Tube route every morning )   lactulose (CHRONULAC) 10 GM/15ML solution Take 7.5 mLs (5 g) by mouth 2 times daily   rifaximin (XIFAXAN) 550 MG TABS tablet Take 1 tablet (550 mg) by mouth 2 times daily   blood glucose monitoring (ONE TOUCH ULTRA) test strip Use to test blood sugars 4 times daily as needed or as directed.   levothyroxine (SYNTHROID/LEVOTHROID) 88 MCG tablet Take 1 tablet (88 mcg) by mouth daily (Patient taking differently: Take 88 mcg by mouth every morning )   ferrous sulfate (IRON) 325 (65 FE) MG tablet Take 1 tablet (325 mg) by mouth 2 times daily   multivitamin, therapeutic with minerals (THERA-VIT-M) TABS Take 1 tablet by mouth 2 times daily   ONE TOUCH LANCETS MISC by In Vitro route 4 times daily as needed     No current facility-administered medications on file prior to visit.        Allergies   Allergen Reactions     No Clinical Screening - See  Comments      Coban and Surgilast cause itching     Tegaderm Transparent Dressing (Informational Only)      Latex Itching and Rash       Past Medical History:   Diagnosis Date     Brain tumor (H)      Cancer (H) 04/15/2013     Depressive disorder 02/01/2001     Diabetes (H) 04/01/2012     Liver failure (H)          Review of Systems  [facility] ENT ROS 3/7/2018   Constitutional Appetite change, Problems with sleep   Neurology Dizzy spells, Unexplained weakness, Headache   Psychology Frequently feeling depressed or sad   Ears, Nose, Throat Sore throat   Cardiopulmonary Cough, Breathing problems, Wheezing   Gastrointestinal/Genitourinary Diarrhea   Musculoskeletal Sore or stiff joints, Swollen legs/feet   Allergy/Immunology Skin changes   Hematologic Bleeding problems        14 point ROS neg other than the symptoms noted above.    Physical Exam:    General Assessment   The patient is alert, oriented and in no acute distress.   Head/Face/Scalp  Grossly normal.   Nose  External nose is straight, skin is normal. Intranasal exam (anterior rhinoscopy) reveals caudal septal perforation with minimal crusting.  No obvious vessels to cauterize.    Again, thank you for allowing me to participate in the care of your patient.      Sincerely,    Gertrude Minaya MD

## 2018-03-07 NOTE — NURSING NOTE
Chief Complaint   Patient presents with     Consult     epistaxis per Rajinder Staples Medical Assistant

## 2018-03-07 NOTE — PROGRESS NOTES
Post Upper GI Endoscopy (3/5/2018) with Dr. Kelly: Follow-up    Post procedure recommendations: - Repeat upper endoscopy in 6 weeks in the OR for variceal surveillance and if no banding is performed will increase surveillance intervals     Patient states: Per patient's wife patient is having no pain or nausea. Denies fever or chills. Patient is tolerating soft PO intake with no difficulty.     Orders placed: Upper Endoscopy    Contact information verified for future questions/concerns.    Vanessa HORTON RN Coordinator  Dr. Kelly  Advanced Endoscopy  [phone]

## 2018-03-07 NOTE — MR AVS SNAPSHOT
After Visit Summary   3/7/2018    Mono Varghese    MRN: [id number]           Patient Information     Date Of Birth          1970        Visit Information        Provider Department      3/7/2018 2:20 PM Abdelrahman Narayan PA-C [facility]        Today's Diagnoses     Gastroesophageal reflux disease without esophagitis    -  1       Follow-ups after your visit        Your next 10 appointments already scheduled     Mar 20, 2018  9:30 AM CDT   (Arrive by 9:15 AM)   Return Visit with King Louis MD   [facility] ([facility])    [address]  4th Floor  [facility] [address]   [phone]            Apr 17, 2018  9:00 AM CDT   Lab with  LAB   [facility] Lab [facility])    [address] [place] [address]  [address] [facility] [address]   [phone]            Apr 17, 2018  9:40 AM CDT   (Arrive by 9:25 AM)   Return General Liver with Beatriz Tanner MD   [facility] Hepatology ([facility])    [address] [place] [address]  [address]  [facility] [address]   [phone]            Apr 27, 2018 11:30 AM CDT   (Arrive by 11:15 AM)   MR BRAIN W/O & W CONTRAST with [address] [facility] ([facility])    [address] [place] [id number] [phone]           Take your medicines as usual, unless your doctor tells you not to. Bring a list of your current medicines to your exam (including vitamins, minerals and over-the-counter drugs).  You may or may not receive intravenous (IV) contrast for this exam pending the discretion of the Radiologist.  You do not need to do anything special to prepare.  The MRI machine uses a strong magnet. Please wear clothes without metal (snaps, zippers). A sweatsuit works well, or we may give you a hospital gown.  Please remove any body piercings and hair extensions  before you arrive. You will also remove watches, jewelry, hairpins, wallets, dentures, partial dental plates and hearing aids. You may wear contact lenses, and you may be able to wear your rings. We have a safe place to keep your personal items, but it is safer to leave them at home.  **IMPORTANT** THE INSTRUCTIONS BELOW ARE ONLY FOR THOSE PATIENTS WHO HAVE BEEN PRESCRIBED SEDATION OR GENERAL ANESTHESIA DURING THEIR MRI PROCEDURE:  IF YOUR DOCTOR PRESCRIBED ORAL SEDATION (take medicine to help you relax during your exam):   You must get the medicine from your doctor (oral medication) before you arrive. Bring the medicine to the exam. Do not take it at home. You ll be told when to take it upon arriving for your exam.   Arrive one hour early. Bring someone who can take you home after the test. Your medicine will make you sleepy. After the exam, you may not drive, take a bus or take a taxi by yourself.  IF YOUR DOCTOR PRESCRIBED IV SEDATION:   Arrive one hour early. Bring someone who can take you home after the test. Your medicine will make you sleepy. After the exam, you may not drive, take a bus or take a taxi by yourself.   No eating 6 hours before your exam. You may have clear liquids up until 4 hours before your exam. (Clear liquids include water, clear tea, black coffee and fruit juice without pulp.)  IF YOUR DOCTOR PRESCRIBED ANESTHESIA (be asleep for your exam):   Arrive 1 1/2 hours early. Bring someone who can take you home after the test. You may not drive, take a bus or take a taxi by yourself.   No eating 8 hours before your exam. You may have clear liquids up until 4 hours before your exam. (Clear liquids include water, clear tea, black coffee and fruit juice without pulp.)   You will spend four to five hours in the recovery room.  Please call the Imaging Department at your exam site with any questions.            May 04, 2018 11:00 AM CDT   (Arrive by 10:45 AM)   Return Visit with Lauro Shaw MD   [facility]  [facility] ([facility])    [address]  [address]  [facility] [address] [phone]            May 21, 2018  9:30 AM CDT   Return Visit with King Quiles DPM   [facility] ([facility])    [address] [address] [place] [address]   [phone]            Sep 04, 2018 12:30 PM CDT   (Arrive by 12:15 PM)   Return Seizure with Anil English MD   [facility]rology ([facility])    [address]  3rd Floor  [facility] [id number] [phone]              Future tests that were ordered for you today     Open Future Orders        Priority Expected Expires Ordered    UPPER GI ENDOSCOPY Routine  4/21/2018 3/7/2018    Hepatic panel Routine 4/17/2018 3/6/2019 3/6/2018    Basic metabolic panel Routine 4/17/2018 3/6/2019 3/6/2018    INR Routine 4/17/2018 3/6/2019 3/6/2018    CBC with platelets Routine 4/17/2018 3/6/2019 3/6/2018            Who to contact     Please call your clinic at [phone] to:    Ask questions about your health    Make or cancel appointments    Discuss your medicines    Learn about your test results    Speak to your doctor            Additional Information About Your Visit        [employer] Information     [employer] gives you secure access to your electronic health record. If you see a primary care provider, you can also send messages to your care team and make appointments. If you have questions, please call your primary care clinic.  If you do not have a primary care provider, please call [phone] and they will assist you.      [employer] is an electronic gateway that provides easy, online access to your medical records. With [employer], you can request a clinic appointment, read your test results, renew a prescription or communicate with your care team.     To access your existing account, please contact your [facility] or call [phone]  "for assistance.        Care EveryWhere ID     This is your Care EveryWhere ID. This could be used by other organizations to access your [facility] medical records  [id number]        Your Vitals Were     Pulse Temperature Height Pulse Oximetry BMI (Body Mass Index)       71 97.9  F (36.6  C) 1.778 m (5' 10\") 93% 29.56 kg/m2        Blood Pressure from Last 3 Encounters:   03/07/18 135/85   03/05/18 (!) 150/101   03/01/18 124/80    Weight from Last 3 Encounters:   03/07/18 93.4 kg (206 lb)   03/07/18 93.4 kg (206 lb)   03/05/18 98.6 kg (217 lb 6 oz)              Today, you had the following     No orders found for display         Today's Medication Changes          These changes are accurate as of 3/7/18  3:01 PM.  If you have any questions, ask your nurse or doctor.               Start taking these medicines.        Dose/Directions    mupirocin 2 % ointment   Commonly known as:  BACTROBAN   Used for:  Epistaxis   Started by:  Gertrude Minaya MD        Use in nose 2-3 times per week   Quantity:  22 g   Refills:  0         These medicines have changed or have updated prescriptions.        Dose/Directions    cyanocobalamin 1000 MCG tablet   Commonly known as:  vitamin  B-12   This may have changed:  when to take this   Used for:  Alcoholic cirrhosis of liver with ascites (H)        Dose:  1000 mcg   1 tablet (1,000 mcg) by Per G Tube route daily   Quantity:  130 tablet   Refills:  2       dulaglutide 1.5 MG/0.5ML pen   Commonly known as:  TRULICITY   This may have changed:  additional instructions   Used for:  DM type 2, goal HbA1c 7%-8% (H)        Dose:  1.5 mg   Inject 1.5 mg Subcutaneous every 7 days   Quantity:  6 mL   Refills:  1       levothyroxine 88 MCG tablet   Commonly known as:  SYNTHROID/LEVOTHROID   This may have changed:  when to take this   Used for:  Hypothyroidism        Dose:  88 mcg   Take 1 tablet (88 mcg) by mouth daily   Quantity:  90 tablet   Refills:  3            Where to get your medicines    "   These medications were sent to [facility] [address] [place], [place] - [address] [facility]  [address] [facility] [address]     Phone:  [phone]     mupirocin 2 % ointment                Primary Care Provider Office Phone # Fax #    King Louis [phone]       [address] [address]        Equal Access to Services     SAMUEL FAIR : Hadii aad ku hadasho Soomaali, waaxda luqadaha, qaybta kaalmada adeegyada, waxay idiin hayaan adeeg kharash lajoyce tamayo. So [facility] [phone].    ATENCIÓN: Si habla español, tiene a chiang disposición servicios gratuitos de asistencia lingüística. [facility] [phone].    We comply with applicable federal civil rights laws and [place] laws. We do not discriminate on the basis of race, color, national origin, age, disability, sex, sexual orientation, or gender identity.            Thank you!     Thank you for choosing [facility]  for your care. Our goal is always to provide you with excellent care. Hearing back from our patients is one way we can continue to improve our services. Please take a few minutes to complete the written survey that you may receive in the mail after your visit with us. Thank you!             Your Updated Medication List - Protect others around you: Learn how to safely use, store and throw away your medicines at www.disposemymeds.org.          This list is accurate as of 3/7/18  3:01 PM.  Always use your most recent med list.                   Brand Name Dispense Instructions for use Diagnosis    blood glucose monitoring test strip    ONETOUCH ULTRA    300 each    Use to test blood sugars 4 times daily as needed or as directed.    Diabetes mellitus, type 2 (H)       Calcium Carb-Cholecalciferol 500-400 MG-UNIT Tabs    calcium 500 +D    90 tablet    Take 500 mg by mouth daily    Malnutrition (H)       CANE, ANY MATERIAL     1 each    One cane    Balance disorder       ciclopirox 0.77  % cream    LOPROX    90 g    Apply topically 2 times daily To feet and toenails.    Tinea pedis of both feet       cyanocobalamin 1000 MCG tablet    vitamin  B-12    130 tablet    1 tablet (1,000 mcg) by Per G Tube route daily    Alcoholic cirrhosis of liver with ascites (H)       dulaglutide 1.5 MG/0.5ML pen    TRULICITY    6 mL    Inject 1.5 mg Subcutaneous every 7 days    DM type 2, goal HbA1c 7%-8% (H)       ferrous sulfate 325 (65 FE) MG tablet    IRON    200 tablet    Take 1 tablet (325 mg) by mouth 2 times daily    Other iron deficiency anemia       gabapentin 100 MG capsule    NEURONTIN    30 capsule    Take 1 capsule (100 mg) by mouth At Bedtime    Mild episode of recurrent major depressive disorder (H)       glipiZIDE 10 MG 24 hr tablet    GLUCOTROL XL    90 tablet    Take 1 tablet (10 mg) by mouth daily    DM type 2, goal HbA1c 7%-8% (H)       HYDROcodone-acetaminophen 5-325 MG per tablet    NORCO    60 tablet    Take 2 tablets by mouth every 6 hours as needed for moderate to severe pain    Brain tumor (H)       hydrOXYzine 25 MG tablet    ATARAX    180 tablet    Take 1 tablet (25 mg) by mouth 2 times daily    Itching, Anxiety       lactulose 10 GM/15ML solution    CHRONULAC    473 mL    Take 7.5 mLs (5 g) by mouth 2 times daily    Hepatic encephalopathy (H)       levothyroxine 88 MCG tablet    SYNTHROID/LEVOTHROID    90 tablet    Take 1 tablet (88 mcg) by mouth daily    Hypothyroidism       lidocaine 4 % Crea cream    LMX4    133 g    Apply topically once as needed for mild pain    Port catheter in place       methylphenidate 10 MG tablet    RITALIN    60 tablet    10mg twice a day    Major depressive disorder with single episode, in partial remission (H)       mirtazapine 45 MG tablet    REMERON    30 tablet    Take 1 tablet (45 mg) by mouth At Bedtime    Major depressive disorder with single episode, in partial remission (H)       multivitamin, therapeutic with minerals Tabs tablet      Take 1 tablet  by mouth 2 times daily        mupirocin 2 % ointment    BACTROBAN    22 g    Use in nose 2-3 times per week    Epistaxis       omeprazole 20 MG CR capsule    priLOSEC    360 capsule    Take 2 capsules (40 mg) by mouth 2 times daily    Gastroesophageal reflux disease without esophagitis       ONETOUCH LANCETS Misc     100 each    by In Vitro route 4 times daily as needed    Type II or unspecified type diabetes mellitus without mention of complication, not stated as uncontrolled       phenytoin 30 MG CR capsule    DILANTIN    720 capsule    Take 4 capsules (120 mg) by mouth 2 times daily    Oligoastrocytoma of parietal lobe (H)       pravastatin 80 MG tablet    PRAVACHOL    90 tablet    Take 1 tablet (80 mg) by mouth daily    High cholesterol       rifaximin 550 MG Tabs tablet    XIFAXAN    60 tablet    Take 1 tablet (550 mg) by mouth 2 times daily    Hepatic encephalopathy (H)       simethicone 80 MG chewable tablet    MYLICON    60 tablet    Take 1 tablet (80 mg) by mouth every 6 hours as needed for cramping    Abdominal cramping       sulfamethoxazole-trimethoprim 800-160 MG per tablet    BACTRIM DS/SEPTRA DS    30 tablet    Take 1 tablet by mouth daily    Spontaneous bacterial peritonitis (H)       thiamine 100 MG tablet     100 tablet    Take 1 tablet (100 mg) by mouth daily    Alcoholic cirrhosis of liver with ascites (H)       traZODone 50 MG tablet    DESYREL    30 tablet    Take 1 tablet (50 mg) by mouth nightly as needed for sleep    Primary insomnia

## 2018-03-07 NOTE — NURSING NOTE
"Chief Complaint   Patient presents with     RECHECK      F/U 3/5 EGD       Vitals:    03/07/18 1302   BP: 135/85   Pulse: 71   Temp: 97.9  F (36.6  C)   SpO2: 93%   Weight: 93.4 kg (206 lb)   Height: 1.778 m (5' 10\")       Body mass index is 29.56 kg/(m^2).                            "

## 2018-03-09 ENCOUNTER — TELEPHONE (OUTPATIENT)
Dept: SURGERY | Facility: CLINIC | Age: 48
End: 2018-03-09

## 2018-03-09 NOTE — TELEPHONE ENCOUNTER
Patient's wife left a message requesting to schedule an appointment with c/r surgeon (referred by GI for rectal varacies).  Called Yisel at number provided, and left a message with my direct number to schedule.

## 2018-03-14 ENCOUNTER — TELEPHONE (OUTPATIENT)
Dept: GASTROENTEROLOGY | Facility: CLINIC | Age: 48
End: 2018-03-14

## 2018-03-14 NOTE — TELEPHONE ENCOUNTER
Yisel is informed that Mono is scheduled on 04/16/2018 at 10 AM with an arrival time of 8 AM for an Upper GI Endoscopy with Dr. Howard.  Patient instructed to have a pre-op done prior to his procedure, Yisel will be driving him to and from this procedure and she knows to monitor him for at least 24 hours post.  All instructions along with the pre-op form will be mailed to pt at his address listed in EPIC, it was confirmed during this call to be current.     SR 03/14/2018 @ 848 A

## 2018-03-15 DIAGNOSIS — K70.31 ALCOHOLIC CIRRHOSIS OF LIVER WITH ASCITES (H): Primary | ICD-10-CM

## 2018-03-16 NOTE — TELEPHONE ENCOUNTER
Patient's wife Yisel left a message returning my call.  Called and spoke with Yisel.  Offered this upcoming Tuesday 3/20/18 at 10:00 am with Dr. Stevens. Yisel confirms appointment and check-in location.

## 2018-03-20 ENCOUNTER — TRANSFERRED RECORDS (OUTPATIENT)
Dept: HEALTH INFORMATION MANAGEMENT | Facility: CLINIC | Age: 48
End: 2018-03-20

## 2018-03-20 ENCOUNTER — OFFICE VISIT (OUTPATIENT)
Dept: SURGERY | Facility: CLINIC | Age: 48
End: 2018-03-20
Payer: MEDICARE

## 2018-03-20 VITALS
TEMPERATURE: 98.3 F | BODY MASS INDEX: 31.24 KG/M2 | OXYGEN SATURATION: 98 % | WEIGHT: 218.2 LBS | HEIGHT: 70 IN | DIASTOLIC BLOOD PRESSURE: 93 MMHG | SYSTOLIC BLOOD PRESSURE: 140 MMHG | HEART RATE: 81 BPM

## 2018-03-20 DIAGNOSIS — K62.5 RECTAL BLEEDING: Primary | ICD-10-CM

## 2018-03-20 ASSESSMENT — ENCOUNTER SYMPTOMS
CHILLS: 1
BACK PAIN: 1
EYE PAIN: 0
PANIC: 0
RECTAL PAIN: 0
FEVER: 0
MEMORY LOSS: 1
COUGH DISTURBING SLEEP: 1
HYPERTENSION: 1
JAUNDICE: 0
TROUBLE SWALLOWING: 0
TREMORS: 0
SMELL DISTURBANCE: 0
POLYDIPSIA: 1
CONSTIPATION: 0
SNORES LOUDLY: 0
LEG PAIN: 0
PALPITATIONS: 0
MUSCLE WEAKNESS: 0
HYPOTENSION: 0
HEMOPTYSIS: 0
HEARTBURN: 0
EYE IRRITATION: 0
STIFFNESS: 0
PARALYSIS: 0
TASTE DISTURBANCE: 0
TINGLING: 0
POLYPHAGIA: 0
EXERCISE INTOLERANCE: 0
NECK MASS: 0
ARTHRALGIAS: 0
POSTURAL DYSPNEA: 0
EYE REDNESS: 0
HALLUCINATIONS: 0
SYNCOPE: 0
DYSPNEA ON EXERTION: 1
EYE WATERING: 0
SLEEP DISTURBANCES DUE TO BREATHING: 0
MUSCLE CRAMPS: 1
DOUBLE VISION: 0
NIGHT SWEATS: 0
ALTERED TEMPERATURE REGULATION: 0
HEADACHES: 0
ORTHOPNEA: 0
DISTURBANCES IN COORDINATION: 1
DIZZINESS: 1
LIGHT-HEADEDNESS: 1
SORE THROAT: 1
BLOATING: 1
BRUISES/BLEEDS EASILY: 1
SEIZURES: 1
VOMITING: 0
NAUSEA: 0
WEAKNESS: 0
DEPRESSION: 1
DIARRHEA: 1
SPUTUM PRODUCTION: 1
FATIGUE: 1
COUGH: 1
BLOOD IN STOOL: 1
SPEECH CHANGE: 1
INSOMNIA: 1
WHEEZING: 1
JOINT SWELLING: 0
SWOLLEN GLANDS: 0
HOARSE VOICE: 1
WEIGHT GAIN: 0
MYALGIAS: 0
LOSS OF CONSCIOUSNESS: 0
WEIGHT LOSS: 0
ABDOMINAL PAIN: 0
DECREASED CONCENTRATION: 1
NERVOUS/ANXIOUS: 1
SINUS CONGESTION: 1
NECK PAIN: 1
SINUS PAIN: 0
INCREASED ENERGY: 1
SHORTNESS OF BREATH: 1
NUMBNESS: 0
BOWEL INCONTINENCE: 0
DECREASED APPETITE: 1

## 2018-03-20 ASSESSMENT — PAIN SCALES - GENERAL: PAINLEVEL: NO PAIN (0)

## 2018-03-20 NOTE — NURSING NOTE
"Chief Complaint   Patient presents with     Clinic Care Coordination - Initial     New patient consult, rectal varacies.        Vitals:    03/20/18 1005   BP: (!) 140/93   BP Location: Left arm   Patient Position: Chair   Cuff Size: Adult Large   Pulse: 81   Temp: 98.3  F (36.8  C)   TempSrc: Oral   SpO2: 98%   Weight: 218 lb 3.2 oz   Height: 5' 10\"       Body mass index is 31.31 kg/(m^2).  Judah LARIOS LPN                  "

## 2018-03-20 NOTE — MR AVS SNAPSHOT
After Visit Summary   3/20/2018    Mono Varghese    MRN: [id number]           Patient Information     Date Of Birth          1970        Visit Information        Provider Department      3/20/2018 10:00 AM Jacobo Stevens MD [facility] Colon and Rectal Surgery        Today's Diagnoses     Rectal bleeding    -  1       Follow-ups after your visit        Your next 10 appointments already scheduled     Apr 16, 2018   Procedure with Guru Jose Kelly MD   [facility] [place], Same Day Surgery (--)    [address]  [facility] [address] [phone]            Apr 25, 2018  9:30 AM CDT   Lab with  LAB   [facility] ([facility])    [address]  1st Floor  [facility] [address] [phone]            Apr 25, 2018 10:30 AM CDT   (Arrive by 10:15 AM)   Return General Liver with Jennifer Alcazar MD   [facility] Hepatology ([facility])    [address]  [address]  [facility] [address] [phone]            Apr 27, 2018 11:30 AM CDT   (Arrive by 11:15 AM)   MR BRAIN W/O & W CONTRAST with [address] [facility] ([facility])    [address]  1st [facility] [address] [phone]           Take your medicines as usual, unless your doctor tells you not to. Bring a list of your current medicines to your exam (including vitamins, minerals and over-the-counter drugs).  You may or may not receive intravenous (IV) contrast for this exam pending the discretion of the Radiologist.  You do not need to do anything special to prepare.  The MRI machine uses a strong magnet. Please wear clothes without metal (snaps, zippers). A sweatsuit works well, or we may give you a hospital gown.  Please remove any body piercings and hair extensions before you arrive. You will also remove watches, jewelry, hairpins, wallets, dentures, partial dental plates and hearing  aids. You may wear contact lenses, and you may be able to wear your rings. We have a safe place to keep your personal items, but it is safer to leave them at home.  **IMPORTANT** THE INSTRUCTIONS BELOW ARE ONLY FOR THOSE PATIENTS WHO HAVE BEEN PRESCRIBED SEDATION OR GENERAL ANESTHESIA DURING THEIR MRI PROCEDURE:  IF YOUR DOCTOR PRESCRIBED ORAL SEDATION (take medicine to help you relax during your exam):   You must get the medicine from your doctor (oral medication) before you arrive. Bring the medicine to the exam. Do not take it at home. You ll be told when to take it upon arriving for your exam.   Arrive one hour early. Bring someone who can take you home after the test. Your medicine will make you sleepy. After the exam, you may not drive, take a bus or take a taxi by yourself.  IF YOUR DOCTOR PRESCRIBED IV SEDATION:   Arrive one hour early. Bring someone who can take you home after the test. Your medicine will make you sleepy. After the exam, you may not drive, take a bus or take a taxi by yourself.   No eating 6 hours before your exam. You may have clear liquids up until 4 hours before your exam. (Clear liquids include water, clear tea, black coffee and fruit juice without pulp.)  IF YOUR DOCTOR PRESCRIBED ANESTHESIA (be asleep for your exam):   Arrive 1 1/2 hours early. Bring someone who can take you home after the test. You may not drive, take a bus or take a taxi by yourself.   No eating 8 hours before your exam. You may have clear liquids up until 4 hours before your exam. (Clear liquids include water, clear tea, black coffee and fruit juice without pulp.)   You will spend four to five hours in the recovery room.  Please call the Imaging Department at your exam site with any questions.            May 04, 2018 10:45 AM CDT   (Arrive by 10:30 AM)   Return Visit with Lauro Shaw MD   [facility] ([facility] [place])    [address]  [address]  [facility] [address]  "  [phone]            May 21, 2018  9:30 AM CDT   Return Visit with [facility] Quiles DPM   [facility]    [address] [place] [address] [phone]            Sep 04, 2018 12:30 PM CDT   (Arrive by 12:15 PM)   Return Seizure with Anil English MD   [facility] ([facility] [place])    [address] [address] [phone]              Who to contact     Please call your clinic at [phone] to:    Ask questions about your health    Make or cancel appointments    Discuss your medicines    Learn about your test results    Speak to your doctor            Additional Information About Your Visit        [employer] Information     [employer] gives you secure access to your electronic health record. If you see a primary care provider, you can also send messages to your care team and make appointments. If you have questions, please call your primary care clinic.  If you do not have a primary care provider, please call [phone] and they will assist you.      [employer] is an electronic gateway that provides easy, online access to your medical records. With [employer], you can request a clinic appointment, read your test results, renew a prescription or communicate with your care team.     To access your existing account, please contact your [facility] or call [phone] for assistance.        Care EveryWhere ID     This is your Care EveryWhere ID. This could be used by other organizations to access your [place] medical records  [id number]        Your Vitals Were     Pulse Temperature Height Pulse Oximetry BMI (Body Mass Index)       81 98.3  F (36.8  C) (Oral) 5' 10\" 98% 31.31 kg/m2        Blood Pressure from Last 3 Encounters:   03/20/18 (!) 140/93   03/07/18 135/85   03/05/18 (!) 150/101    Weight from Last 3 Encounters:   03/20/18 218 lb 3.2 oz   03/07/18 206 lb "   03/07/18 206 lb              Today, you had the following     No orders found for display         Today's Medication Changes          These changes are accurate as of 3/20/18 11:59 PM.  If you have any questions, ask your nurse or doctor.               These medicines have changed or have updated prescriptions.        Dose/Directions    cyanocobalamin 1000 MCG tablet   Commonly known as:  vitamin  B-12   This may have changed:  when to take this   Used for:  Alcoholic cirrhosis of liver with ascites (H)        Dose:  1000 mcg   1 tablet (1,000 mcg) by Per G Tube route daily   Quantity:  130 tablet   Refills:  2       dulaglutide 1.5 MG/0.5ML pen   Commonly known as:  TRULICITY   This may have changed:  additional instructions   Used for:  DM type 2, goal HbA1c 7%-8% (H)        Dose:  1.5 mg   Inject 1.5 mg Subcutaneous every 7 days   Quantity:  6 mL   Refills:  1       levothyroxine 88 MCG tablet   Commonly known as:  SYNTHROID/LEVOTHROID   This may have changed:  when to take this   Used for:  Hypothyroidism        Dose:  88 mcg   Take 1 tablet (88 mcg) by mouth daily   Quantity:  90 tablet   Refills:  3                Primary Care Provider Office Phone # Fax #    [place] [phone]       [address] [facility] [address]  [facility] [address]        Equal Access to Services     SAMUEL FAIR AH: Jennifer lirao Sojanna, waaxda luqadaha, qaybta kaalmada adeegyada, josue tamayo. So [facility] [phone].    ATENCIÓN: Si habla español, tiene a chiang disposición servicios gratuitos de asistencia lingüística. Llame al [phone].    We comply with applicable federal civil rights laws and [place] laws. We do not discriminate on the basis of race, color, national origin, age, disability, sex, sexual orientation, or gender identity.            Thank you!     Thank you for choosing [facility] COLON AND RECTAL SURGERY  for your care. Our goal is always to provide you with  excellent care. Hearing back from our patients is one way we can continue to improve our services. Please take a few minutes to complete the written survey that you may receive in the mail after your visit with us. Thank you!             Your Updated Medication List - Protect others around you: Learn how to safely use, store and throw away your medicines at www.disposemymeds.org.          This list is accurate as of 3/20/18 11:59 PM.  Always use your most recent med list.                   Brand Name Dispense Instructions for use Diagnosis    blood glucose monitoring test strip    ONETOUCH ULTRA    300 each    Use to test blood sugars 4 times daily as needed or as directed.    Diabetes mellitus, type 2 (H)       Calcium Carb-Cholecalciferol 500-400 MG-UNIT Tabs    calcium 500 +D    90 tablet    Take 500 mg by mouth daily    Malnutrition (H)       CANE, ANY MATERIAL     1 each    One cane    Balance disorder       ciclopirox 0.77 % cream    LOPROX    90 g    Apply topically 2 times daily To feet and toenails.    Tinea pedis of both feet       cyanocobalamin 1000 MCG tablet    vitamin  B-12    130 tablet    1 tablet (1,000 mcg) by Per G Tube route daily    Alcoholic cirrhosis of liver with ascites (H)       dulaglutide 1.5 MG/0.5ML pen    TRULICITY    6 mL    Inject 1.5 mg Subcutaneous every 7 days    DM type 2, goal HbA1c 7%-8% (H)       ferrous sulfate 325 (65 FE) MG tablet    IRON    200 tablet    Take 1 tablet (325 mg) by mouth 2 times daily    Other iron deficiency anemia       gabapentin 100 MG capsule    NEURONTIN    30 capsule    Take 1 capsule (100 mg) by mouth At Bedtime    Mild episode of recurrent major depressive disorder (H)       glipiZIDE 10 MG 24 hr tablet    GLUCOTROL XL    90 tablet    Take 1 tablet (10 mg) by mouth daily    DM type 2, goal HbA1c 7%-8% (H)       HYDROcodone-acetaminophen 5-325 MG per tablet    NORCO    60 tablet    Take 2 tablets by mouth every 6 hours as needed for moderate to  severe pain    Brain tumor (H)       hydrOXYzine 25 MG tablet    ATARAX    180 tablet    Take 1 tablet (25 mg) by mouth 2 times daily    Itching, Anxiety       lactulose 10 GM/15ML solution    CHRONULAC    473 mL    Take 7.5 mLs (5 g) by mouth 2 times daily    Hepatic encephalopathy (H)       levothyroxine 88 MCG tablet    SYNTHROID/LEVOTHROID    90 tablet    Take 1 tablet (88 mcg) by mouth daily    Hypothyroidism       lidocaine 4 % Crea cream    LMX4    133 g    Apply topically once as needed for mild pain    Port catheter in place       methylphenidate 10 MG tablet    RITALIN    60 tablet    10mg twice a day    Major depressive disorder with single episode, in partial remission (H)       mirtazapine 45 MG tablet    REMERON    30 tablet    Take 1 tablet (45 mg) by mouth At Bedtime    Major depressive disorder with single episode, in partial remission (H)       multivitamin, therapeutic with minerals Tabs tablet      Take 1 tablet by mouth 2 times daily        mupirocin 2 % ointment    BACTROBAN    22 g    Use in nose 2-3 times per week    Epistaxis       omeprazole 20 MG CR capsule    priLOSEC    360 capsule    Take 2 capsules (40 mg) by mouth 2 times daily    Gastroesophageal reflux disease without esophagitis       ONETOUCH LANCETS Misc     100 each    by In Vitro route 4 times daily as needed    Type II or unspecified type diabetes mellitus without mention of complication, not stated as uncontrolled       phenytoin 30 MG CR capsule    DILANTIN    720 capsule    Take 4 capsules (120 mg) by mouth 2 times daily    Oligoastrocytoma of parietal lobe (H)       pravastatin 80 MG tablet    PRAVACHOL    90 tablet    Take 1 tablet (80 mg) by mouth daily    High cholesterol       rifaximin 550 MG Tabs tablet    XIFAXAN    60 tablet    Take 1 tablet (550 mg) by mouth 2 times daily    Hepatic encephalopathy (H)       simethicone 80 MG chewable tablet    MYLICON    60 tablet    Take 1 tablet (80 mg) by mouth every 6 hours as  needed for cramping    Abdominal cramping       sulfamethoxazole-trimethoprim 800-160 MG per tablet    BACTRIM DS/SEPTRA DS    30 tablet    Take 1 tablet by mouth daily    Spontaneous bacterial peritonitis (H)       thiamine 100 MG tablet     100 tablet    Take 1 tablet (100 mg) by mouth daily    Alcoholic cirrhosis of liver with ascites (H)       traZODone 50 MG tablet    DESYREL    30 tablet    Take 1 tablet (50 mg) by mouth nightly as needed for sleep    Primary insomnia

## 2018-03-20 NOTE — PROGRESS NOTES
"Colon and Rectal Surgery Clinic Note      RE: Mono Varghese  : 1970  KIANA: 3/20/2018      Mono Varghese is a very pleasant 47 year old male with a history of brain tumor, diabetes mellitus, liver cirrhosis with MELD of 12, and history of esophageal varices who was seen in clinic in January with Jody Hernandez NP for rectal bleeding. He presents today with his mother in law, Faby, for follow up.    Mono underwent a flexible sigmoidoscopy with Dr. Guru Kelly on 18  With \"prominent rectal veins and internal hemorrhoids\". He has had continued rectal bleeding about twice a week. This is enough to fill the toilet bowl. He denies any dizziness or lightheadedness. He was having 7-10 bowel movements a day with lactulose and continues on lacutolose but is taking imodium for the diarrhea and is going about twice a day now. He has had multiple paracentesis lately, removing about 6 L each time, and is scheduled for this today again. He reports quitting drinking about 2 years ago.     Physical Exam: Exam was declined today.     Assessment/Plan: I discussed with Mono and Faby the risks of treating both hemorrhoids and rectal varices. We discussed the risks of bleeding with portal hypertension. Since his hemoglobin has remained stable, I would not recommend any further intervention for either hemorrhoids or varices unless bleeding becomes severe and emergent intervention is needed. He declined exam today.  Patient's questions were answered to his stated satisfaction and he is in agreement with this plan.     Total face to face time was 15 minutes, >50% counseling.    For details of past medical history, surgical history, family history, medications, allergies, and review of systems, please see details below.    Medical history:  Past Medical History:   Diagnosis Date     Brain tumor (H)      Cancer (H) 04/15/2013     Depressive disorder 2001     Diabetes (H) 2012     Liver " failure (H)        Surgical history:  Past Surgical History:   Procedure Laterality Date     COLONOSCOPY N/A 11/27/2015    Procedure: COMBINED COLONOSCOPY, SINGLE OR MULTIPLE BIOPSY/POLYPECTOMY BY BIOPSY;  Surgeon: Ran Thurston MD;  Location: [facility]     ESOPHAGOSCOPY, GASTROSCOPY, DUODENOSCOPY (EGD), COMBINED N/A 11/23/2015    Procedure: COMBINED ESOPHAGOSCOPY, GASTROSCOPY, DUODENOSCOPY (EGD);  Surgeon: Rocael Rizvi MD;  Location: [facility]     ESOPHAGOSCOPY, GASTROSCOPY, DUODENOSCOPY (EGD), COMBINED N/A 1/25/2017    Procedure: COMBINED ESOPHAGOSCOPY, GASTROSCOPY, DUODENOSCOPY (EGD), BIOPSY SINGLE OR MULTIPLE;  Surgeon: Rocael Tejada MD;  Location: [facility]     ESOPHAGOSCOPY, GASTROSCOPY, DUODENOSCOPY (EGD), COMBINED N/A 3/30/2017    Procedure: COMBINED ESOPHAGOSCOPY, GASTROSCOPY, DUODENOSCOPY (EGD);  Surgeon: Jordana Ramirez MD;  Location: [facility]     ESOPHAGOSCOPY, GASTROSCOPY, DUODENOSCOPY (EGD), COMBINED N/A 1/19/2018    Procedure: COMBINED ESOPHAGOSCOPY, GASTROSCOPY, DUODENOSCOPY (EGD);  Upper Endoscopy with verceal banding; Flexible Sigmoidoscopy;  Surgeon: Guru Jose Kelly MD;  Location: [facility]     ESOPHAGOSCOPY, GASTROSCOPY, DUODENOSCOPY (EGD), COMBINED N/A 2/12/2018    Procedure: COMBINED ESOPHAGOSCOPY, GASTROSCOPY, DUODENOSCOPY (EGD);  Esophagogastroduodenoscopy with variceal banding;  Surgeon: Guru Jose Kelly MD;  Location: [facility]     ESOPHAGOSCOPY, GASTROSCOPY, DUODENOSCOPY (EGD), COMBINED N/A 3/5/2018    Procedure: COMBINED ESOPHAGOSCOPY, GASTROSCOPY, DUODENOSCOPY (EGD);  Esophagogastroduodenoscopy  with banding of varices Latex Allergy ;  Surgeon: Guru Jose Kelly MD;  [facility]     OPTICAL TRACKING SYSTEM CRANIOTOMY, EXCISE TUMOR, COMBINED  6/6/2013    Procedure: COMBINED OPTICAL TRACKING SYSTEM CRANIOTOMY, EXCISE TUMOR;  Left Stealth Guided Craniotomy , Tumor Resection ;  Surgeon: J Carlos Aranda MD;   Location: [facility]     ORTHOPEDIC SURGERY      hand surgery- right     SIGMOIDOSCOPY FLEXIBLE N/A 11/24/2015    Procedure: SIGMOIDOSCOPY FLEXIBLE;  Surgeon: Rocael Rizvi MD;  Location: [facility]     SIGMOIDOSCOPY FLEXIBLE N/A 1/19/2018    Procedure: SIGMOIDOSCOPY FLEXIBLE;;  Surgeon: Guru Jose Kelly MD;  [facility]: [facility]       Family history:  Family History   Problem Relation Age of Onset     Unknown/Adopted Mother        Medications:  Current Outpatient Prescriptions   Medication Sig Dispense Refill     mupirocin (BACTROBAN) 2 % ointment Use in nose 2-3 times per week 22 g 0     phenytoin (DILANTIN) 30 MG CR capsule Take 4 capsules (120 mg) by mouth 2 times daily 720 capsule 11     hydrOXYzine (ATARAX) 25 MG tablet Take 1 tablet (25 mg) by mouth 2 times daily 180 tablet 0     ciclopirox (LOPROX) 0.77 % cream Apply topically 2 times daily To feet and toenails. 90 g 4     simethicone (MYLICON) 80 MG chewable tablet Take 1 tablet (80 mg) by mouth every 6 hours as needed for cramping 60 tablet 1     glipiZIDE (GLUCOTROL XL) 10 MG 24 hr tablet Take 1 tablet (10 mg) by mouth daily 90 tablet 1     sulfamethoxazole-trimethoprim (BACTRIM DS/SEPTRA DS) 800-160 MG per tablet Take 1 tablet by mouth daily 30 tablet 5     methylphenidate (RITALIN) 10 MG tablet 10mg twice a day 60 tablet 0     mirtazapine (REMERON) 45 MG tablet Take 1 tablet (45 mg) by mouth At Bedtime 30 tablet 3     traZODone (DESYREL) 50 MG tablet Take 1 tablet (50 mg) by mouth nightly as needed for sleep 30 tablet 3     gabapentin (NEURONTIN) 100 MG capsule Take 1 capsule (100 mg) by mouth At Bedtime 30 capsule 3     pravastatin (PRAVACHOL) 80 MG tablet Take 1 tablet (80 mg) by mouth daily 90 tablet 3     Calcium Carb-Cholecalciferol (CALCIUM 500 +D) 500-400 MG-UNIT TABS Take 500 mg by mouth daily 90 tablet 1     thiamine (VITAMIN B-1) 100 MG tablet Take 1 tablet (100 mg) by mouth daily 100 tablet 1     lidocaine (LMX4) 4 % CREA cream Apply  topically once as needed for mild pain 133 g 1     CANE, ANY MATERIAL One cane 1 each 0     HYDROcodone-acetaminophen (NORCO) 5-325 MG per tablet Take 2 tablets by mouth every 6 hours as needed for moderate to severe pain 60 tablet 0     omeprazole (PRILOSEC) 20 MG CR capsule Take 2 capsules (40 mg) by mouth 2 times daily 360 capsule 3     dulaglutide (TRULICITY) 1.5 MG/0.5ML pen Inject 1.5 mg Subcutaneous every 7 days (Patient taking differently: Inject 1.5 mg Subcutaneous every 7 days Tuesday) 6 mL 1     cyanocobalamin (VITAMIN  B-12) 1000 MCG tablet 1 tablet (1,000 mcg) by Per G Tube route daily (Patient taking differently: 1,000 mcg by Per G Tube route every morning ) 130 tablet 2     lactulose (CHRONULAC) 10 GM/15ML solution Take 7.5 mLs (5 g) by mouth 2 times daily 473 mL 1     rifaximin (XIFAXAN) 550 MG TABS tablet Take 1 tablet (550 mg) by mouth 2 times daily 60 tablet 11     blood glucose monitoring (ONE TOUCH ULTRA) test strip Use to test blood sugars 4 times daily as needed or as directed. 300 each 4     levothyroxine (SYNTHROID/LEVOTHROID) 88 MCG tablet Take 1 tablet (88 mcg) by mouth daily (Patient taking differently: Take 88 mcg by mouth every morning ) 90 tablet 3     ferrous sulfate (IRON) 325 (65 FE) MG tablet Take 1 tablet (325 mg) by mouth 2 times daily 200 tablet 3     multivitamin, therapeutic with minerals (THERA-VIT-M) TABS Take 1 tablet by mouth 2 times daily       ONE TOUCH LANCETS MISC by In Vitro route 4 times daily as needed 100 each prn     Allergies:  The patientis allergic to no clinical screening - see comments; tegaderm transparent dressing (informational only); and latex.    Social history:  Social History   Substance Use Topics     Smoking status: Never Smoker     Smokeless tobacco: Never Used     Alcohol use No      Comment: Quit 01/2014     Marital status: .    Review of Systems:  Nursing Notes:   Curtis Bonner LPN  3/20/2018 10:09 AM  Signed  Chief Complaint   Patient  "presents with     Clinic Care Coordination - Initial     New patient consult, rectal varacies.        Vitals:    03/20/18 1005   BP: (!) 140/93   BP Location: Left arm   Patient Position: Chair   Cuff Size: Adult Large   Pulse: 81   Temp: 98.3  F (36.8  C)   TempSrc: Oral   SpO2: 98%   Weight: 218 lb 3.2 oz   Height: 5' 10\"       Body mass index is 31.31 kg/(m^2).  Judah LARIOS, REYNALDON                         Jacobo Stevens MD   Professor and Chief  Division of Colon and Rectal Surgery  [facility]      Referring Provider:  Abdelrahman Narayan PA-C  [address] [place] [address]     Primary Care Provider:  [place]  "

## 2018-03-20 NOTE — LETTER
"3/20/2018       RE: Mono Varghese  [address] [facility] [address]     Dear Colleague,    Thank you for referring your patient, Mono Varghese, to the [facility] COLON AND RECTAL SURGERY at [facility]. Please see a copy of my visit note below.    Colon and Rectal Surgery Clinic Note      RE: Mono Varghese  : 1970  KIANA: 3/20/2018      Mono Varghese is a very pleasant 47 year old male with a history of brain tumor, diabetes mellitus, liver cirrhosis with MELD of 12, and history of esophageal varices who was seen in clinic in January with Jody Hernandez NP for rectal bleeding. He presents today with his mother in law, Faby, for follow up.    Mono underwent a flexible sigmoidoscopy with Dr. Guru Kelly on 18  With \"prominent rectal veins and internal hemorrhoids\". He has had continued rectal bleeding about twice a week. This is enough to fill the toilet bowl. He denies any dizziness or lightheadedness. He was having 7-10 bowel movements a day with lactulose and continues on lacutolose but is taking imodium for the diarrhea and is going about twice a day now. He has had multiple paracentesis lately, removing about 6 L each time, and is scheduled for this today again. He reports quitting drinking about 2 years ago.     Physical Exam: Exam was declined today.     Assessment/Plan: I discussed with Mono and Faby the risks of treating both hemorrhoids and rectal varices. We discussed the risks of bleeding with portal hypertension. Since his hemoglobin has remained stable, I would not recommend any further intervention for either hemorrhoids or varices unless bleeding becomes severe and emergent intervention is needed. He declined exam today.  Patient's questions were answered to his stated satisfaction and he is in agreement with this plan.     Total face to face time was 15 minutes, >50% counseling.    For details of past medical " history, surgical history, family history, medications, allergies, and review of systems, please see details below.    Medical history:  Past Medical History:   Diagnosis Date     Brain tumor (H)      Cancer (H) 04/15/2013     Depressive disorder 02/01/2001     Diabetes (H) 04/01/2012     Liver failure (H)        Surgical history:  Past Surgical History:   Procedure Laterality Date     COLONOSCOPY N/A 11/27/2015    Procedure: COMBINED COLONOSCOPY, SINGLE OR MULTIPLE BIOPSY/POLYPECTOMY BY BIOPSY;  Surgeon: Ran Thurston MD;  Location: [facility]     ESOPHAGOSCOPY, GASTROSCOPY, DUODENOSCOPY (EGD), COMBINED N/A 11/23/2015    Procedure: COMBINED ESOPHAGOSCOPY, GASTROSCOPY, DUODENOSCOPY (EGD);  Surgeon: Rocael Rizvi MD;  Location: [facility] OR     ESOPHAGOSCOPY, GASTROSCOPY, DUODENOSCOPY (EGD), COMBINED N/A 1/25/2017    Procedure: COMBINED ESOPHAGOSCOPY, GASTROSCOPY, DUODENOSCOPY (EGD), BIOPSY SINGLE OR MULTIPLE;  Surgeon: Rocael Tejada MD;  Location: [facility]     ESOPHAGOSCOPY, GASTROSCOPY, DUODENOSCOPY (EGD), COMBINED N/A 3/30/2017    Procedure: COMBINED ESOPHAGOSCOPY, GASTROSCOPY, DUODENOSCOPY (EGD);  Surgeon: Jordana Ramirez MD;  Location: [facility]     ESOPHAGOSCOPY, GASTROSCOPY, DUODENOSCOPY (EGD), COMBINED N/A 1/19/2018    Procedure: COMBINED ESOPHAGOSCOPY, GASTROSCOPY, DUODENOSCOPY (EGD);  Upper Endoscopy with verceal banding; Flexible Sigmoidoscopy;  Surgeon: Guru Jose Kelly MD;  Location: [facility] OR     ESOPHAGOSCOPY, GASTROSCOPY, DUODENOSCOPY (EGD), COMBINED N/A 2/12/2018    Procedure: COMBINED ESOPHAGOSCOPY, GASTROSCOPY, DUODENOSCOPY (EGD);  Esophagogastroduodenoscopy with variceal banding;  Surgeon: Guru Jose Kelly MD;  Location: [facility]     ESOPHAGOSCOPY, GASTROSCOPY, DUODENOSCOPY (EGD), COMBINED N/A 3/5/2018    Procedure: COMBINED ESOPHAGOSCOPY, GASTROSCOPY, DUODENOSCOPY (EGD);  Esophagogastroduodenoscopy  with banding of varices Latex Allergy ;  Surgeon:  Guru Jose Kelly MD;  Location:  OR     OPTICAL TRACKING SYSTEM CRANIOTOMY, EXCISE TUMOR, COMBINED  6/6/2013    Procedure: COMBINED OPTICAL TRACKING SYSTEM CRANIOTOMY, EXCISE TUMOR;  Left Stealth Guided Craniotomy , Tumor Resection ;  Surgeon: J Carlos Aranda MD;  Location:  OR     ORTHOPEDIC SURGERY      hand surgery- right     SIGMOIDOSCOPY FLEXIBLE N/A 11/24/2015    Procedure: SIGMOIDOSCOPY FLEXIBLE;  Surgeon: Rocael Rizvi MD;  Location:  GI     SIGMOIDOSCOPY FLEXIBLE N/A 1/19/2018    Procedure: SIGMOIDOSCOPY FLEXIBLE;;  Surgeon: Guru Jose Kelly MD;  Location:  OR       Family history:  Family History   Problem Relation Age of Onset     Unknown/Adopted Mother        Medications:  Current Outpatient Prescriptions   Medication Sig Dispense Refill     mupirocin (BACTROBAN) 2 % ointment Use in nose 2-3 times per week 22 g 0     phenytoin (DILANTIN) 30 MG CR capsule Take 4 capsules (120 mg) by mouth 2 times daily 720 capsule 11     hydrOXYzine (ATARAX) 25 MG tablet Take 1 tablet (25 mg) by mouth 2 times daily 180 tablet 0     ciclopirox (LOPROX) 0.77 % cream Apply topically 2 times daily To feet and toenails. 90 g 4     simethicone (MYLICON) 80 MG chewable tablet Take 1 tablet (80 mg) by mouth every 6 hours as needed for cramping 60 tablet 1     glipiZIDE (GLUCOTROL XL) 10 MG 24 hr tablet Take 1 tablet (10 mg) by mouth daily 90 tablet 1     sulfamethoxazole-trimethoprim (BACTRIM DS/SEPTRA DS) 800-160 MG per tablet Take 1 tablet by mouth daily 30 tablet 5     methylphenidate (RITALIN) 10 MG tablet 10mg twice a day 60 tablet 0     mirtazapine (REMERON) 45 MG tablet Take 1 tablet (45 mg) by mouth At Bedtime 30 tablet 3     traZODone (DESYREL) 50 MG tablet Take 1 tablet (50 mg) by mouth nightly as needed for sleep 30 tablet 3     gabapentin (NEURONTIN) 100 MG capsule Take 1 capsule (100 mg) by mouth At Bedtime 30 capsule 3     pravastatin (PRAVACHOL) 80 MG tablet  Take 1 tablet (80 mg) by mouth daily 90 tablet 3     Calcium Carb-Cholecalciferol (CALCIUM 500 +D) 500-400 MG-UNIT TABS Take 500 mg by mouth daily 90 tablet 1     thiamine (VITAMIN B-1) 100 MG tablet Take 1 tablet (100 mg) by mouth daily 100 tablet 1     lidocaine (LMX4) 4 % CREA cream Apply topically once as needed for mild pain 133 g 1     CANE, ANY MATERIAL One cane 1 each 0     HYDROcodone-acetaminophen (NORCO) 5-325 MG per tablet Take 2 tablets by mouth every 6 hours as needed for moderate to severe pain 60 tablet 0     omeprazole (PRILOSEC) 20 MG CR capsule Take 2 capsules (40 mg) by mouth 2 times daily 360 capsule 3     dulaglutide (TRULICITY) 1.5 MG/0.5ML pen Inject 1.5 mg Subcutaneous every 7 days (Patient taking differently: Inject 1.5 mg Subcutaneous every 7 days Tuesday) 6 mL 1     cyanocobalamin (VITAMIN  B-12) 1000 MCG tablet 1 tablet (1,000 mcg) by Per G Tube route daily (Patient taking differently: 1,000 mcg by Per G Tube route every morning ) 130 tablet 2     lactulose (CHRONULAC) 10 GM/15ML solution Take 7.5 mLs (5 g) by mouth 2 times daily 473 mL 1     rifaximin (XIFAXAN) 550 MG TABS tablet Take 1 tablet (550 mg) by mouth 2 times daily 60 tablet 11     blood glucose monitoring (ONE TOUCH ULTRA) test strip Use to test blood sugars 4 times daily as needed or as directed. 300 each 4     levothyroxine (SYNTHROID/LEVOTHROID) 88 MCG tablet Take 1 tablet (88 mcg) by mouth daily (Patient taking differently: Take 88 mcg by mouth every morning ) 90 tablet 3     ferrous sulfate (IRON) 325 (65 FE) MG tablet Take 1 tablet (325 mg) by mouth 2 times daily 200 tablet 3     multivitamin, therapeutic with minerals (THERA-VIT-M) TABS Take 1 tablet by mouth 2 times daily       ONE TOUCH LANCETS MISC by In Vitro route 4 times daily as needed 100 each prn     Allergies:  The patientis allergic to no clinical screening - see comments; tegaderm transparent dressing (informational only); and latex.    Social  "history:  Social History   Substance Use Topics     Smoking status: Never Smoker     Smokeless tobacco: Never Used     Alcohol use No      Comment: Quit 01/2014     Marital status: .    Review of Systems:  Nursing Notes:   Curtis Bonner LPN  3/20/2018 10:09 AM  Signed  Chief Complaint   Patient presents with     Clinic Care Coordination - Initial     New patient consult, rectal varacies.        Vitals:    03/20/18 1005   BP: (!) 140/93   BP Location: Left arm   Patient Position: Chair   Cuff Size: Adult Large   Pulse: 81   Temp: 98.3  F (36.8  C)   TempSrc: Oral   SpO2: 98%   Weight: 218 lb 3.2 oz   Height: 5' 10\"       Body mass index is 31.31 kg/(m^2).  Judah LARIOS LPN      Referring Provider:  Abdelrahman Narayan PA-C  [address] [place] [id number]     Primary Care Provider:  [place]    Again, thank you for allowing me to participate in the care of your patient.      Sincerely,    Jacobo Stevens MD  "

## 2018-03-22 ENCOUNTER — DOCUMENTATION ONLY (OUTPATIENT)
Dept: CARE COORDINATION | Facility: CLINIC | Age: 48
End: 2018-03-22

## 2018-03-26 DIAGNOSIS — E11.9 DIABETES MELLITUS, TYPE 2 (H): ICD-10-CM

## 2018-03-26 DIAGNOSIS — E11.9 DM TYPE 2, GOAL HBA1C 7%-8% (H): ICD-10-CM

## 2018-03-26 DIAGNOSIS — D49.6 BRAIN TUMOR (H): ICD-10-CM

## 2018-03-26 RX ORDER — HYDROCODONE BITARTRATE AND ACETAMINOPHEN 5; 325 MG/1; MG/1
2 TABLET ORAL EVERY 6 HOURS PRN
Qty: 60 TABLET | Refills: 0 | Status: CANCELLED | OUTPATIENT
Start: 2018-03-26

## 2018-03-26 NOTE — TELEPHONE ENCOUNTER
Norco  Last Written Prescription Date:  12/19/17  Last Fill Quantity: 60,   # refills: 0  Trulicity  Last ordered 10/10/17  6 ml refills x 1  Last Office Visit : 2/9/18  Future Office visit:  No future appt    Routing refill request to clinic nurse for review/approval because:  Drug not on the [facility], [facility] or  Health refill protocol or controlled substance  Trulicity failed protocol

## 2018-03-27 ENCOUNTER — MEDICAL CORRESPONDENCE (OUTPATIENT)
Dept: HEALTH INFORMATION MANAGEMENT | Facility: CLINIC | Age: 48
End: 2018-03-27

## 2018-03-27 RX ORDER — HYDROCODONE BITARTRATE AND ACETAMINOPHEN 5; 325 MG/1; MG/1
2 TABLET ORAL EVERY 6 HOURS PRN
Qty: 60 TABLET | Refills: 0 | Status: ON HOLD | OUTPATIENT
Start: 2018-03-27 | End: 2018-01-01

## 2018-03-28 DIAGNOSIS — K76.82 HEPATIC ENCEPHALOPATHY (H): ICD-10-CM

## 2018-03-28 RX ORDER — LACTULOSE 10 G/15ML
5 SOLUTION ORAL 2 TIMES DAILY
Qty: 473 ML | Refills: 3 | Status: ON HOLD | OUTPATIENT
Start: 2018-03-28 | End: 2018-01-01

## 2018-03-30 ENCOUNTER — DOCUMENTATION ONLY (OUTPATIENT)
Dept: CARE COORDINATION | Facility: CLINIC | Age: 48
End: 2018-03-30

## 2018-03-30 NOTE — PROGRESS NOTES
[facility] utilizes an encounter to take the place of a direct phone call to your office. Please take a moment to review the below request. Your reply to this encounter will act as a verbal OK of orders if requested below. Thank you.    Patient Update/FYI    Two falls occurred since last week. . Spouse was not home for the first fall and client does not recall details. Clients MIL was with client during this fall. The second fall occurred when client was attempting to carry too many items up the stairs. He had a fall on the stair landing and hit his right shoulder and bumped his head. Some bruising present to shoulder. No headaches since the fall.  Denies any further injuries. Edu on not carrying appropriate amount and use of hand rails when going upstairs.     Angela [place] RN Case Manager  [phone]  [email or web address]

## 2018-04-03 ENCOUNTER — TRANSFERRED RECORDS (OUTPATIENT)
Dept: HEALTH INFORMATION MANAGEMENT | Facility: CLINIC | Age: 48
End: 2018-04-03

## 2018-04-03 DIAGNOSIS — E11.9 DM TYPE 2, GOAL HBA1C 7%-8% (H): ICD-10-CM

## 2018-04-09 ENCOUNTER — ANESTHESIA EVENT (OUTPATIENT)
Dept: SURGERY | Facility: CLINIC | Age: 48
End: 2018-04-09
Payer: MEDICARE

## 2018-04-10 ENCOUNTER — TRANSFERRED RECORDS (OUTPATIENT)
Dept: HEALTH INFORMATION MANAGEMENT | Facility: CLINIC | Age: 48
End: 2018-04-10

## 2018-04-11 ENCOUNTER — ALLIED HEALTH/NURSE VISIT (OUTPATIENT)
Dept: SURGERY | Facility: CLINIC | Age: 48
End: 2018-04-11
Payer: MEDICARE

## 2018-04-11 ENCOUNTER — OFFICE VISIT (OUTPATIENT)
Dept: SURGERY | Facility: CLINIC | Age: 48
End: 2018-04-11
Payer: MEDICARE

## 2018-04-11 ENCOUNTER — APPOINTMENT (OUTPATIENT)
Dept: SURGERY | Facility: CLINIC | Age: 48
End: 2018-04-11
Payer: MEDICARE

## 2018-04-11 VITALS
OXYGEN SATURATION: 97 % | DIASTOLIC BLOOD PRESSURE: 79 MMHG | BODY MASS INDEX: 28.45 KG/M2 | HEART RATE: 74 BPM | WEIGHT: 198.7 LBS | TEMPERATURE: 97.8 F | HEIGHT: 70 IN | SYSTOLIC BLOOD PRESSURE: 125 MMHG

## 2018-04-11 DIAGNOSIS — Z01.818 PREOP EXAMINATION: Primary | ICD-10-CM

## 2018-04-11 DIAGNOSIS — Z01.818 PREOP EXAMINATION: ICD-10-CM

## 2018-04-11 LAB
ALBUMIN SERPL-MCNC: 3.3 G/DL (ref 3.4–5)
ALP SERPL-CCNC: 298 U/L (ref 40–150)
ALT SERPL W P-5'-P-CCNC: 28 U/L (ref 0–70)
ANION GAP SERPL CALCULATED.3IONS-SCNC: 6 MMOL/L (ref 3–14)
AST SERPL W P-5'-P-CCNC: 47 U/L (ref 0–45)
BILIRUB SERPL-MCNC: 1 MG/DL (ref 0.2–1.3)
BUN SERPL-MCNC: 7 MG/DL (ref 7–30)
CALCIUM SERPL-MCNC: 8.4 MG/DL (ref 8.5–10.1)
CHLORIDE SERPL-SCNC: 108 MMOL/L (ref 94–109)
CO2 SERPL-SCNC: 24 MMOL/L (ref 20–32)
CREAT SERPL-MCNC: 0.84 MG/DL (ref 0.66–1.25)
ERYTHROCYTE [DISTWIDTH] IN BLOOD BY AUTOMATED COUNT: 14.4 % (ref 10–15)
GFR SERPL CREATININE-BSD FRML MDRD: >90 ML/MIN/1.7M2
GLUCOSE SERPL-MCNC: 46 MG/DL (ref 70–99)
HBA1C MFR BLD: 5.2 % (ref 0–6.4)
HCT VFR BLD AUTO: 35.8 % (ref 40–53)
HGB BLD-MCNC: 12.4 G/DL (ref 13.3–17.7)
INR PPP: 1.55 (ref 0.86–1.14)
MCH RBC QN AUTO: 33 PG (ref 26.5–33)
MCHC RBC AUTO-ENTMCNC: 34.6 G/DL (ref 31.5–36.5)
MCV RBC AUTO: 95 FL (ref 78–100)
PLATELET # BLD AUTO: 45 10E9/L (ref 150–450)
POTASSIUM SERPL-SCNC: 3.8 MMOL/L (ref 3.4–5.3)
PROT SERPL-MCNC: 6.2 G/DL (ref 6.8–8.8)
RBC # BLD AUTO: 3.76 10E12/L (ref 4.4–5.9)
SODIUM SERPL-SCNC: 138 MMOL/L (ref 133–144)
WBC # BLD AUTO: 1.7 10E9/L (ref 4–11)

## 2018-04-11 ASSESSMENT — LIFESTYLE VARIABLES: TOBACCO_USE: 0

## 2018-04-11 ASSESSMENT — ENCOUNTER SYMPTOMS
ORTHOPNEA: 0
SEIZURES: 1

## 2018-04-11 NOTE — H&P
Pre-Operative H & P     Date of Encounter: 4/11/2018  Primary Care Physician:  [place]    CC: Liver disease with esophageal varices.      HPI:  Mono Varghese is a 47 year old male who presents for pre-operative H & P in preparation for Combined Esophagoscopy, Gastroscopy, Duodenoscopy on 4/16/18 with Dr. Guru Kelly at [facility].     The patient's medical history is significant for esophageal varices, requiring surveillance EGDs.  His most recent EGD took place on 3/5/18, and scars from previous banding procedures, and one post-panding ulcer were noted in the esophagus.  Some esophageal varices remained, and 6 bands were placed to treat these.  Portal hypertensive gastropathy was also noted, as well as two inflammatory gastric polyps at the gastric cardia.  The duodenum was normal.  No ectopic varices were noted.  Arrangements are being made for the above procedures.   History is obtained from the patient and the medical record.    Past Medical History:  Past Medical History:   Diagnosis Date     ADHD      Alcoholic cirrhosis of liver with ascites (H) 06/17/2015    cirrhosis secondary to alcohol, complicated by variceal bleeding and hepatic encephalopathy      Ascites due to alcoholic cirrhosis (H)     Requiring regular paracentesis.     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 status post subtotal resection by Dr. J Carlos Aranda in early 06/2013. Negative for 1p/19q deletions. S/P Concurrent chemoradiation July 15 through August 23, 2013. Initiated adjuvant oral temozolomide 9/17/13; switch     Hyperlipidemia LDL goal <100 8/1/2016     Hypothyroidism 8/1/2016     Liver failure (H)      Moderate major depression (H) 10/3/2012     Obesity      LENA (obstructive sleep apnea)-moderate (AHI 16) 12/18/2012    Polysomnogram 12/14/2012: 208.3 lbs.  Lowest oxygen saturation was 86.0%. Apnea/Hypopnea Index was 16.4 events per hour.  REM AHI is 20.7, supine AHI is 16.4.      Pancytopenia (H) 8/1/2016    Chronic pancytopenia secondary to liver disease since at least 2012      Partial epilepsy with impairment of consciousness (H) 05/07/2014     Portal hypertensive gastropathy      Portal vein thrombosis 12/18/2015    history of left lower extremity deep vein thrombosis as well as portal vein and superior mesenteric vein thrombosis, late 2015 when he was hospitalized in the ICU and seriously ill temporary IVC filter placed in 12/2015 when he was in the ICU with deep vein thromboses and active bleeding      Pulmonary nodules 6/17/2015     Rectal bleeding      Type 2 diabetes mellitus (H) 10/3/2012     Past Surgical History:  Past Surgical History:   Procedure Laterality Date     COLONOSCOPY N/A 11/27/2015    Procedure: COMBINED COLONOSCOPY, SINGLE OR MULTIPLE BIOPSY/POLYPECTOMY BY BIOPSY;  Surgeon: Ran Thurston MD;  Location:  GI     ESOPHAGOSCOPY, GASTROSCOPY, DUODENOSCOPY (EGD), COMBINED N/A 11/23/2015    Procedure: COMBINED ESOPHAGOSCOPY, GASTROSCOPY, DUODENOSCOPY (EGD);  Surgeon: Rocael Rizvi MD;  Location: [facility] OR     ESOPHAGOSCOPY, GASTROSCOPY, DUODENOSCOPY (EGD), COMBINED N/A 1/25/2017    Procedure: COMBINED ESOPHAGOSCOPY, GASTROSCOPY, DUODENOSCOPY (EGD), BIOPSY  SINGLE OR MULTIPLE;  Surgeon: Rocael Tejada MD;  Location:  GI     ESOPHAGOSCOPY, GASTROSCOPY, DUODENOSCOPY (EGD), COMBINED N/A 3/30/2017    Procedure: COMBINED ESOPHAGOSCOPY, GASTROSCOPY, DUODENOSCOPY (EGD);  Surgeon: Jordana Ramirez MD;  Location:  GI     ESOPHAGOSCOPY, GASTROSCOPY, DUODENOSCOPY (EGD), COMBINED N/A 1/19/2018    Procedure: COMBINED ESOPHAGOSCOPY, GASTROSCOPY, DUODENOSCOPY (EGD);  Upper Endoscopy with verceal banding; Flexible Sigmoidoscopy;  Surgeon: Guru Jose Kelly MD;  Location:  OR     ESOPHAGOSCOPY, GASTROSCOPY, DUODENOSCOPY (EGD), COMBINED N/A 2/12/2018    Procedure: COMBINED ESOPHAGOSCOPY, GASTROSCOPY, DUODENOSCOPY (EGD);  Esophagogastroduodenoscopy with variceal banding;  Surgeon: Guru Jose Kelly MD;  Location:  OR     ESOPHAGOSCOPY, GASTROSCOPY, DUODENOSCOPY (EGD), COMBINED N/A 3/5/2018    Procedure: COMBINED ESOPHAGOSCOPY, GASTROSCOPY, DUODENOSCOPY (EGD);  Esophagogastroduodenoscopy  with banding of varices Latex Allergy ;  Surgeon: Guru Jose Kelly MD;  Location:  OR     OPTICAL TRACKING SYSTEM CRANIOTOMY, EXCISE TUMOR, COMBINED  6/6/2013    Procedure: COMBINED OPTICAL TRACKING SYSTEM CRANIOTOMY, EXCISE TUMOR;  Left Stealth Guided Craniotomy , Tumor Resection ;  Surgeon: J Carlos Aranda MD;  Location:  OR     ORTHOPEDIC SURGERY      hand surgery- right     SIGMOIDOSCOPY FLEXIBLE N/A 11/24/2015    Procedure: SIGMOIDOSCOPY FLEXIBLE;  Surgeon: Rocael Rizvi MD;  Location:  GI     SIGMOIDOSCOPY FLEXIBLE N/A 1/19/2018    Procedure: SIGMOIDOSCOPY FLEXIBLE;;  Surgeon: Guru Jose Kelly MD;  Location:  OR     Hx of Blood transfusions/reactions: History of transfusion, no known reactions.    Hx of abnormal bleeding or anti-platelet use: Continues to have rectal bleeding when passing bowel movements.      Menstrual history: No LMP for male patient.    Steroid use in the last  year: Denies.    Personal or FH of difficulty with anesthesia: Denies.    Prior to admission medications  Current Outpatient Prescriptions   Medication Sig Dispense Refill     lactulose (CHRONULAC) 10 GM/15ML solution Take 7.5 mLs (5 g) by mouth 2 times daily 473 mL 3     HYDROcodone-acetaminophen (NORCO) 5-325 MG per tablet Take 2 tablets by mouth every 6 hours as needed for moderate to severe pain 60 tablet 0     blood glucose monitoring (ONETOUCH ULTRA) test strip Use to test blood sugars 4 times daily as needed or as directed. 400 each 1     dulaglutide (TRULICITY) 1.5 MG/0.5ML pen Inject 1.5 mg Subcutaneous every 7 days 6 mL 0     mupirocin (BACTROBAN) 2 % ointment Use in nose 2-3 times per week 22 g 0     phenytoin (DILANTIN) 30 MG CR capsule Take 4 capsules (120 mg) by mouth 2 times daily 720 capsule 11     hydrOXYzine (ATARAX) 25 MG tablet Take 1 tablet (25 mg) by mouth 2 times daily 180 tablet 0     ciclopirox (LOPROX) 0.77 % cream Apply topically 2 times daily To feet and toenails. 90 g 4     simethicone (MYLICON) 80 MG chewable tablet Take 1 tablet (80 mg) by mouth every 6 hours as needed for cramping 60 tablet 1     glipiZIDE (GLUCOTROL XL) 10 MG 24 hr tablet Take 1 tablet (10 mg) by mouth daily 90 tablet 1     sulfamethoxazole-trimethoprim (BACTRIM DS/SEPTRA DS) 800-160 MG per tablet Take 1 tablet by mouth daily 30 tablet 5     methylphenidate (RITALIN) 10 MG tablet 10mg twice a day 60 tablet 0     mirtazapine (REMERON) 45 MG tablet Take 1 tablet (45 mg) by mouth At Bedtime 30 tablet 3     traZODone (DESYREL) 50 MG tablet Take 1 tablet (50 mg) by mouth nightly as needed for sleep 30 tablet 3     gabapentin (NEURONTIN) 100 MG capsule Take 1 capsule (100 mg) by mouth At Bedtime 30 capsule 3     pravastatin (PRAVACHOL) 80 MG tablet Take 1 tablet (80 mg) by mouth daily 90 tablet 3     Calcium Carb-Cholecalciferol (CALCIUM 500 +D) 500-400 MG-UNIT TABS Take 500 mg by mouth daily 90 tablet 1     thiamine  (VITAMIN B-1) 100 MG tablet Take 1 tablet (100 mg) by mouth daily 100 tablet 1     lidocaine (LMX4) 4 % CREA cream Apply topically once as needed for mild pain 133 g 1     CANE, ANY MATERIAL One cane 1 each 0     omeprazole (PRILOSEC) 20 MG CR capsule Take 2 capsules (40 mg) by mouth 2 times daily 360 capsule 3     cyanocobalamin (VITAMIN  B-12) 1000 MCG tablet 1 tablet (1,000 mcg) by Per G Tube route daily (Patient taking differently: 1,000 mcg by Per G Tube route every morning ) 130 tablet 2     rifaximin (XIFAXAN) 550 MG TABS tablet Take 1 tablet (550 mg) by mouth 2 times daily 60 tablet 11     levothyroxine (SYNTHROID/LEVOTHROID) 88 MCG tablet Take 1 tablet (88 mcg) by mouth daily (Patient taking differently: Take 88 mcg by mouth every morning ) 90 tablet 3     ferrous sulfate (IRON) 325 (65 FE) MG tablet Take 1 tablet (325 mg) by mouth 2 times daily 200 tablet 3     multivitamin, therapeutic with minerals (THERA-VIT-M) TABS Take 1 tablet by mouth 2 times daily       ONE TOUCH LANCETS MISC by In Vitro route 4 times daily as needed 100 each prn     Allergies  Allergies   Allergen Reactions     No Clinical Screening - See Comments      Coban and Surgilast cause itching     Tegaderm Transparent Dressing (Informational Only)      Latex Itching and Rash     Social History  Social History     Social History     Marital status:      Spouse name: Yisel      Number of children: 1      Years of education: N/A     Occupational History            Social History Main Topics     Smoking status: Never Smoker     Smokeless tobacco: Never Used     Alcohol use No      Comment: Quit 01/2014     Drug use: No     Sexual activity: Not on file     Other Topics Concern     Parent/Sibling W/ Cabg, Mi Or Angioplasty Before 65f 55m? No     Social History Narrative    On disability      Family History  Family History   Problem Relation Age of Onset     Adopted: Yes     Medical History Unknown Mother      Cirrhosis Father   "    Medical History Unknown Brother      Medical History Unknown Brother      Medical History Unknown Brother      Review of Systems  Functional status: Independent in ADL's.  4 METS.     The complete review of systems is negative other than noted in the HPI or here.   Constitutional: Denies recent changes in weight, or fevers/chills.  Reports that he often has difficulty getting to sleep, and that his sleep is interrupted due to coughing.    Eyes: Glasses for vision correction.  No recent vision changes.  EENT: Denies recent changes in hearing, mouth pain, or difficulty swallowing.  Cardiovascular: Denies chest pain, DOW or orthopnea, or palpitations.  Respiratory: Denies shortness of breath.  Reports a persistent dry cough, more so when his ascites is building up, improved after paracentesis.      GI: Denies nausea/vomiting or constipation.  Chronic diarrhea due to medication.    : Denies dysuria.    Musculoskeletal: Denies joint pain or swelling.    Skin: \"Tape burn\" over the right lower abdomen.    Hematologic: Denies easy bruising.  Was having problems with epistaxis, but improved with blowing his nose gently and keeping his nasal passages moist.  Continues to have rectal bleeding with bowel movements.     Neurologic: Denies migraines, seizures, numbness/tingling.  Reports lightheadedness/dizziness when his blood glucose levels are low.  Denies any syncopal-type episodes.    Psychiatric: Denies changes in mood or affect.      /79  Pulse 74  Temp 97.8  F (36.6  C) (Oral)  Ht 1.778 m (5' 10\")  Wt 90.1 kg (198 lb 11.2 oz)  SpO2 97%  BMI 28.51 kg/m2    198 lbs 11.2 oz  5' 10\"   Body mass index is 28.51 kg/(m^2).    Physical Exam  Constitutional: Patient awake, seated upright in a chair, in no apparent distress.  Appears older than stated age.  Eyes: Pupils equal, round and reactive to light.  Extra ocular muscles intact. Sclera clear.  Conjunctiva normal.  HENT: Head normocephalic.  Oral pharynx " intact with moist mucous membranes.  Dentition very poor with multiple missing, broken teeth.  No thyromegaly appreciated.   Respiratory: Lung sounds clear to auscultation bilaterally.  No rales, rhonchi, or wheezing noted.    Cardiovascular: S1, S2, regular rate and rhythm.  No murmurs, rubs, or gallops noted. Radial and pedal pulses palpable, bilaterally.  1+ pedal edema noted, bilaterally.    GI: Bowel sounds present.  Abdomen rounded, distended, slightly firm to light palpation.  Slight tenderness over the RUQ.    Genitourinary: Exam deferred.  Lymph/Hematologic: No cervical or supraclavicular lymphadenopathy noted.  No excessive bruising noted.    Skin: Color appropriate for race, pale, warm, dry.  No rashes or wounds at anticipated surgical site.  Tape burn over the RLQ of the abdomen.   Musculoskeletal: Full extension of the neck.  No redness, warmth, or swelling of the joints noted. Gross motor strength is normal.    Neurologic: Alert, oriented to name, place and time. Cranial nerves II-XII are grossly intact. Gait is normal.      Neuropsychiatric: Calm, cooperative. Normal affect.     Labs:  18: WBC:  1.7; Hgb: 12.4; Hct: 35.8; Plt: 45; INR: 1.55 (consistent with previous)  18: Na: 138; K: 3.8; Cl: 108; Glu: 46; BUN: 7; Cr: 0.84; Ca: 8.4; Bili total: 1; Albumin: 3.3; Alk phos: 298; ALT: 28; AST: 47 (pt's wife contacted on  at 1620 and advised of the low blood glucose level.)        Imagin/24/15 Echocardiogram:  Interpretation Summary  Technically difficult study secondary to poor acoustic windows and tachycardia.  Hyperdynamic left ventricle, LV EF 65-70% (calculated 75%), Global right ventricular function is normal.  Cardiac valves by Doppler interrogation appear normal.  Unable to assess pulmonary pressures  IVC size is small, patient intubated, consider hypovolemia.  No pericardial effusion.    18 EKG: Sinus rhythm    Lab results and EKG were personally reviewed by this provider.       Outside records from [facility] reviewed.    Assessment and Plan  Mono Varghese is a 47 year old male scheduled to undergo Combined Esophagoscopy, Gastroscopy, Duodenoscopy on 4/16/18 with Dr. Guru Kelly.    He has the following specific operative considerations:   1.  GERD: Patient instructed to take Prilosec as prescribed.    2.  History of seizures: Recommend that seizure precautions be in place throughout the enrique-procedural period.  3.  Liver disease with encephalopathy, esophageal varices, portal vein thrombosis, portal hypertensive gastropathy, recurrent ascites: Recommend that hepatotoxic substances and medications be avoided.    4.  Diabetes: Patient instructed to hold Glucotrol and Trulicity the AM of the procedure.  Recommend close monitoring of blood glucose levels throughout the enrique-procedural period.  5.  Anemia: Recommend use of blood conservation techniques intraoperatively and close monitoring of postoperative bleeding.  6.  Obesity: Recommend careful positioning to prevent airway/ventilatory compromise, or tissue injury.    7.  CBC, INR, CMP, HgbA1c today.    Revised Cardiac Risk Index: 0.9% risk of major adverse cardiac event.  LENA risk: Low  PONV risk score= 1.  (If > 2, anti-emetic intervention is recommended.)  Anesthesia considerations: Refer to PAC assessment in the anesthesia records.    Belén Mendieta NP  Preoperative Assessment Center  [facility]  Phone: [phone]  Fax: [phone]

## 2018-04-11 NOTE — ANESTHESIA PREPROCEDURE EVALUATION
Anesthesia Evaluation     . Pt has had prior anesthetic. Type: General and MAC    No history of anesthetic complications          ROS/MED HX    ENT/Pulmonary:     (+), . Other pulmonary disease Persistent dry cough, more so when his ascites is building up.  Improved after paracentesis.  .   (-) tobacco use, sleep apnea and recent URI   Neurologic:     (+)seizures last seizure: Some time ago.  Well controlled with Dilantin.   features: Spacing out, difficulty sleeping, some shaking of the upper extremities.  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    Cardiovascular:     (+) Dyslipidemia, ----. : . . . :. .      (-) taking anticoagulants/antiplatelets, DOW and orthopnea/PND   METS/Exercise Tolerance:  4 - Raking leaves, gardening   Hematologic: Comments: History of portal vein thrombosis.      (+) History of blood clots pt is not anticoagulated, Anemia, History of Transfusion no previous transfusion reaction -      Musculoskeletal:  - neg musculoskeletal ROS       GI/Hepatic: Comment: Alcohol related hepatic cirrhosis with history of hepatic encephalopathy; esophageal varices with bleeding; portal vein thrombosis; portal hypertensive gastropathy; recurrent ascites; and recurrent rectal bleeding.      (+) GERD liver disease,       Renal/Genitourinary:  - ROS Renal section negative       Endo:     (+) date: 4/11/18 Using insulin - not using insulin pump Normal glucose range:  not previously admitted for DM/DKA thyroid problem hypothyroidism, Obesity, .   (-) Type I DM   Psychiatric:     (+) psychiatric history depression and other (comment) (ADHD)      Infectious Disease:  - neg infectious disease ROS       Malignancy:   (+) Malignancy History of Neuro  Neuro CA Remission status post Surgery, Chemo and Radiation.          Other:    (+) H/O Chronic Pain,H/O chronic opiod use ,                HPI =    Mono Varghese is a 47 year old male scheduled to undergo Combined Esophagoscopy, Gastroscopy, Duodenoscopy on 4/16/18 with Dr. Guru Kelly.    He has the following specific operative considerations:   1.  GERD: Patient instructed to take Prilosec as prescribed.    2.  History of seizures: Recommend that seizure precautions be in place throughout the enrique-procedural period.  3.  Liver disease with encephalopathy, esophageal varices, portal vein thrombosis, portal hypertensive gastropathy, recurrent ascites: Recommend that hepatotoxic substances and medications be avoided.    4.  Diabetes: Patient instructed to hold Glucotrol and Trulicity the AM of the procedure.  Recommend close monitoring of blood glucose levels throughout the enrique-procedural period.  5.  Anemia: Recommend use of blood conservation techniques intraoperatively and close monitoring of postoperative bleeding.  6.  Obesity    Physical Exam      Airway   Mallampati: II  TM distance: >3 FB  Neck ROM: full    Dental   (+) missing and chipped    Cardiovascular   Rhythm and rate: regular and normal  (+) peripheral edema   (-) no murmur    Pulmonary    breath sounds clear to auscultation    Other findings: 4/11/18: WBC:  1.7; Hgb: 12.4; Hct: 35.8; Plt: 45; INR: 1.55 (consistent with previous)  4/11/18: Na: 138; K: 3.8; Cl: 108; Glu: 46; BUN: 7; Cr: 0.84; Ca: 8.4; Bili total: 1; Albumin: 3.3; Alk phos: 298; ALT: 28; AST: 47 (pt's wife contacted on 4/11 at 1620 and advised of the low blood glucose level.)    11/24/15 Echocardiogram:  Interpretation Summary  Technically difficult study secondary to poor acoustic windows and tachycardia.  Hyperdynamic left ventricle, LV EF 65-70% (calculated 75%), Global right ventricular function is normal.  Cardiac valves by Doppler interrogation appear normal.  Unable to assess pulmonary pressures  IVC size is small, patient intubated, consider hypovolemia.  No pericardial effusion.    2/9/18 EKG: Sinus  rhythm    Labs: below  UPT: n.a.    BMP:  Recent Labs   Lab Test  04/11/18   1452   NA  138   POTASSIUM  3.8   CHLORIDE  108   CO2  24   BUN  7   CR  0.84   GLC  46*   UCHE  8.4*     LFTs:   Recent Labs   Lab Test  04/11/18   1452   PROTTOTAL  6.2*   ALBUMIN  3.3*   BILITOTAL  1.0   ALKPHOS  298*   AST  47*   ALT  28     CBC:   Recent Labs   Lab Test  04/11/18   1452   WBC  1.7*   RBC  3.76*   HGB  12.4*   HCT  35.8*   MCV  95   MCH  33.0   MCHC  34.6   RDW  14.4   PLT  45*     Coags:  Recent Labs   Lab Test  04/11/18   1452   06/10/16   0830  05/04/16   1223   INR  1.55*   < >  1.30*  1.19*   PTT   --    --   44*  39*   FIBR   --    --    --   315    < > = values in this interval not displayed.     NOTE significant thrombocytopenia and INR = 1.55!       PAC Discussion and Assessment    ASA Classification: 3  Case is suitable for: [place]  Anesthetic techniques and relevant risks discussed: GA  Invasive monitoring and risk discussed: No  Types:   Possibility and Risk of blood transfusion discussed: No  NPO instructions given:   Additional anesthetic preparation and risks discussed:   Needs early admission to pre-op area:   Other:     PAC Resident/NP Anesthesia Assessment:  Mono Varghese is a 47 year old male scheduled to undergo Combined Esophagoscopy, Gastroscopy, Duodenoscopy on 4/16/18 with Dr. Guru Kelly.    He has the following specific operative considerations:   1.  GERD: Patient instructed to take Prilosec as prescribed.    2.  History of seizures: Recommend that seizure precautions be in place throughout the enrique-procedural period.  3.  Liver disease with encephalopathy, esophageal varices, portal vein thrombosis, portal hypertensive gastropathy, recurrent ascites: Recommend that hepatotoxic substances and medications be avoided.    4.  Diabetes: Patient instructed to hold Glucotrol and Trulicity the AM of the procedure.  Recommend close monitoring of blood glucose levels throughout the  enrique-procedural period.  5.  Anemia: Recommend use of blood conservation techniques intraoperatively and close monitoring of postoperative bleeding.  6.  Obesity: Recommend careful positioning to prevent airway/ventilatory compromise, or tissue injury.    7.  CBC, INR, CMP, HgbA1c today.    Revised Cardiac Risk Index: 0.9% risk of major adverse cardiac event.  LENA risk: Low  PONV risk score= 1.  (If > 2, anti-emetic intervention is recommended.)  Anesthesia considerations: Refer to PAC assessment in the anesthesia records.      Reviewed and Signed by PAC Mid-Level Provider/Resident  Mid-Level Provider/Resident: Belén Mendieta CNP  Date: 4/11/18  Time:     Attending Anesthesiologist Anesthesia Assessment:        Anesthesiologist:   Date:   Time:   Pass/Fail:   Disposition:     PAC Pharmacist Assessment:        Pharmacist:   Date:   Time:      Anesthesia Plan      History & Physical Review  History and physical reviewed and following examination; no interval change.    ASA Status:  3 .    NPO Status:  > 4 hours    Plan for General and ETT with Intravenous induction. Maintenance will be Balanced.    PONV prophylaxis:  Ondansetron (or other 5HT-3) and Dexamethasone or Solumedrol  History and physical assessed; Patient examined.  I have reviewed and agree with this pre-op assessment and anesthetic plan.  Patient and family also agree.   Risks and alternatives presented and discussed.   AQA.  -rcp        Postoperative Care  Postoperative pain management:  IV analgesics.      Consents  Anesthetic plan, risks, benefits and alternatives discussed with:  Patient.  Use of blood products discussed: No .   .                          .

## 2018-04-11 NOTE — PATIENT INSTRUCTIONS
Preparing for Your Surgery      Name:  Mono Varghese   MRN:  [id number]   :  1970   Today's Date:  2018     Arriving for surgery:  Surgery date:   18  Arrival time:  08:00 a.m.  Please come to:       [facility] [address]  [address] [place]  [address]    -   parking is available in front of the hospital from 5:15 am to 8:00 pm    -  Stop at the Information Desk in the lobby    -   Inform the information person that you are here for surgery. An escort to 3c will be provided. If you would not like an escort, please proceed to 3C on the 3rd floor. [phone]     What can I eat or drink?  -  You may have solid food or milk products until 8 hours prior to your surgery midnight  -  You may have water, apple juice or 7up/Sprite until 2 hours prior to your surgery  04:00 a.m.    Which medicines can I take?  -  Do NOT take these medications in the morning, the day of surgery:     Lactulose, Trulicity, Bactroban, Loprox, Prilosec, Simethicone, glipizide, Ritalin, Calcium Carb, Vitamin B, LMX4 cream, Vitamin B-12, Ferrous Sulfate, Multivitamin,     -  Please take these medications the day of surgery:    Hydrocodone as needed, Dilantin, Atarax, Remeron, Neurontin, levothyroxine    How do I prepare myself?  -  Take two showers: one the night before surgery; and one the morning of surgery.         Use Scrubcare or Hibiclens to wash from neck down.  You may use your own shampoo and conditioner. No other hair products.   -  Do NOT use lotion, powder, deodorant, or antiperspirant the day of your surgery.  -  Do NOT wear any makeup, fingernail polish or jewelry.  Do not bring your own medications to the hospital, except for inhalers and eye drops.  -  Bring your ID and insurance card.    Questions or Concerns:  If you have questions or concerns regarding the day of surgery, please call the Preoperative Assessment Center (PAC), Monday-Friday 7AM-7PM:   [phone].  After surgery please call your surgeons office.     AFTER YOUR SURGERY  Breathing exercises   Breathing exercises help you recover faster. Take deep breaths and let the air out slowly. This will:     Help you wake up after surgery.    Help prevent complications like pneumonia.  Preventing complications will help you go home sooner.   We may give you a breathing device (incentive spirometer) to encourage you to breathe deeply.   Nausea and vomiting   You may feel sick to your stomach after surgery; if so, let your nurse know.    Pain control:  After surgery, you may have pain. Our goal is to help you manage your pain. Pain medicine will help you feel comfortable enough to do activities that will help you heal.  These activities may include breathing exercises, walking and physical therapy.   To help your health care team treat your pain we will ask: 1) If you have pain  2) where it is located 3) describe your pain in your words  Methods of pain control include medications given by mouth, vein or by nerve block for some surgeries.  We may give you a pain control pump that will:  1) Deliver the medicine through a tube placed in your vein  2) Control the amount of medicine you receive  3) Allow you to push a button to deliver a dose of pain medicine  Sequential Compression Device (SCD) or Pneumo Boots:  You may need to wear SCD S on your legs or feet. These are wraps connected to a machine that pumps in air and releases it. The repeated pumping helps prevent blood clots from forming.

## 2018-04-11 NOTE — MR AVS SNAPSHOT
After Visit Summary   2018    Mono Varghese    MRN: [id number]           Patient Information     Date Of Birth          1970        Visit Information        Provider Department      2018 2:30 PM Rn, [facility]        Care Instructions    Preparing for Your Surgery      Name:  Mono Varghese   MRN:  [id number]   :  1970   Today's Date:  2018     Arriving for surgery:  Surgery date:   18  Arrival time:  08:00 a.m.  Please come to:       [facility] [address]  [address] [place]  [address]    -   parking is available in front of the hospital from 5:15 am to 8:00 pm    -  Stop at the Information Desk in the lobby    -   Inform the information person that you are here for surgery. An escort to 3c will be provided. If you would not like an escort, please proceed to 3C on the 3rd floor. [phone]     What can I eat or drink?  -  You may have solid food or milk products until 8 hours prior to your surgery midnight  -  You may have water, apple juice or 7up/Sprite until 2 hours prior to your surgery  04:00 a.m.    Which medicines can I take?  -  Do NOT take these medications in the morning, the day of surgery:     Lactulose, Trulicity, Bactroban, Loprox, Prilosec, Simethicone, glipizide, Ritalin, Calcium Carb, Vitamin B, LMX4 cream, Vitamin B-12, Ferrous Sulfate, Multivitamin,     -  Please take these medications the day of surgery:    Hydrocodone as needed, Dilantin, Atarax, Remeron, Neurontin, levothyroxine    How do I prepare myself?  -  Take two showers: one the night before surgery; and one the morning of surgery.         Use Scrubcare or Hibiclens to wash from neck down.  You may use your own shampoo and conditioner. No other hair products.   -  Do NOT use lotion, powder, deodorant, or antiperspirant the day of your surgery.  -  Do NOT wear any makeup, fingernail polish or  jewelry.  Do not bring your own medications to the hospital, except for inhalers and eye drops.  -  Bring your ID and insurance card.    Questions or Concerns:  If you have questions or concerns regarding the day of surgery, please call the Preoperative Assessment Center (PAC), Monday-Friday 7AM-7PM:  [phone].  After surgery please call your surgeons office.     AFTER YOUR SURGERY  Breathing exercises   Breathing exercises help you recover faster. Take deep breaths and let the air out slowly. This will:     Help you wake up after surgery.    Help prevent complications like pneumonia.  Preventing complications will help you go home sooner.   We may give you a breathing device (incentive spirometer) to encourage you to breathe deeply.   Nausea and vomiting   You may feel sick to your stomach after surgery; if so, let your nurse know.    Pain control:  After surgery, you may have pain. Our goal is to help you manage your pain. Pain medicine will help you feel comfortable enough to do activities that will help you heal.  These activities may include breathing exercises, walking and physical therapy.   To help your health care team treat your pain we will ask: 1) If you have pain  2) where it is located 3) describe your pain in your words  Methods of pain control include medications given by mouth, vein or by nerve block for some surgeries.  We may give you a pain control pump that will:  1) Deliver the medicine through a tube placed in your vein  2) Control the amount of medicine you receive  3) Allow you to push a button to deliver a dose of pain medicine  Sequential Compression Device (SCD) or Pneumo Boots:  You may need to wear SCD S on your legs or feet. These are wraps connected to a machine that pumps in air and releases it. The repeated pumping helps prevent blood clots from forming.           Follow-ups after your visit        Your next 10 appointments already scheduled     Apr 11, 2018  2:30 PM CDT    (Arrive by 2:15 PM)   PAC RN ASSESSMENT with  Pac Rn   [facility] ([facility])    [address] [facility]  4th [facility] [address] [phone]            Apr 11, 2018  3:10 PM CDT   (Arrive by 2:55 PM)   PAC Anesthesia Consult with  Pac Anesthesiologist   [facility] ([facility])    [address] [facility]  4th [facility] [id number] [phone]            Apr 11, 2018  3:30 PM CDT   LAB with  LAB   [facility] ([facility])    [address] [place] [id number] [phone]           Please do not eat 10-12 hours before your appointment if you are coming in fasting for labs on lipids, cholesterol, or glucose (sugar). This does not apply to pregnant women. Water, hot tea and black coffee (with nothing added) are okay. Do not drink other fluids, diet soda or chew gum.            Apr 16, 2018   Procedure with Guru Jose Kelly MD   [facility] [place], Same Day Surgery (--)    [address] [facility] [address] [phone]            Apr 25, 2018  9:30 AM CDT   Lab with  LAB   [facility] ([facility])    [address] [place] [id number]   [phone]            Apr 25, 2018 10:30 AM CDT   (Arrive by 10:15 AM)   Return General Liver with Jennifer Alcazar MD   [facility]atology ([facility])    [address] [place] [address]  [address]  [facility] [address]   [phone]            Apr 27, 2018 11:30 AM CDT   (Arrive by 11:15 AM)   MR BRAIN W/O & W CONTRAST with [address] [facility] ([facility])    [address] [place] [id number] [phone]           Take your medicines as usual, unless your doctor tells you not to. Bring a list of your current medicines to your  exam (including vitamins, minerals and over-the-counter drugs).  You may or may not receive intravenous (IV) contrast for this exam pending the discretion of the Radiologist.  You do not need to do anything special to prepare.  The MRI machine uses a strong magnet. Please wear clothes without metal (snaps, zippers). A sweatsuit works well, or we may give you a hospital gown.  Please remove any body piercings and hair extensions before you arrive. You will also remove watches, jewelry, hairpins, wallets, dentures, partial dental plates and hearing aids. You may wear contact lenses, and you may be able to wear your rings. We have a safe place to keep your personal items, but it is safer to leave them at home.  **IMPORTANT** THE INSTRUCTIONS BELOW ARE ONLY FOR THOSE PATIENTS WHO HAVE BEEN PRESCRIBED SEDATION OR GENERAL ANESTHESIA DURING THEIR MRI PROCEDURE:  IF YOUR DOCTOR PRESCRIBED ORAL SEDATION (take medicine to help you relax during your exam):   You must get the medicine from your doctor (oral medication) before you arrive. Bring the medicine to the exam. Do not take it at home. You ll be told when to take it upon arriving for your exam.   Arrive one hour early. Bring someone who can take you home after the test. Your medicine will make you sleepy. After the exam, you may not drive, take a bus or take a taxi by yourself.  IF YOUR DOCTOR PRESCRIBED IV SEDATION:   Arrive one hour early. Bring someone who can take you home after the test. Your medicine will make you sleepy. After the exam, you may not drive, take a bus or take a taxi by yourself.   No eating 6 hours before your exam. You may have clear liquids up until 4 hours before your exam. (Clear liquids include water, clear tea, black coffee and fruit juice without pulp.)  IF YOUR DOCTOR PRESCRIBED ANESTHESIA (be asleep for your exam):   Arrive 1 1/2 hours early. Bring someone who can take you home after the test. You may not drive, take a bus or take a taxi by  yourself.   No eating 8 hours before your exam. You may have clear liquids up until 4 hours before your exam. (Clear liquids include water, clear tea, black coffee and fruit juice without pulp.)   You will spend four to five hours in the recovery room.  Please call the Imaging Department at your exam site with any questions.            May 04, 2018 10:45 AM CDT   (Arrive by 10:30 AM)   Return Visit with Lauro Shaw MD   [facility] ([facility])    [address] [facility]  [address] [place] [id number] [phone]            May 21, 2018  9:30 AM CDT   Return Visit with King Quiles DPM   [facility] ([facility])    [address] [place] [address] [phone]            Sep 04, 2018 12:30 PM CDT   (Arrive by 12:15 PM)   Return Seizure with Anil English MD   [facility] ([facility])    [address] [place] [address]  3rd Floor  [facility] [id number] [phone]              Future tests that were ordered for you today     Open Future Orders        Priority Expected Expires Ordered    CBC with platelets Routine 4/11/2018 5/11/2018 4/11/2018    Comprehensive metabolic panel Routine 4/11/2018 5/11/2018 4/11/2018    INR Routine 4/11/2018 5/11/2018 4/11/2018    Hemoglobin A1c Routine 4/11/2018 5/11/2018 4/11/2018            Who to contact     Please call your clinic at [phone] to:    Ask questions about your health    Make or cancel appointments    Discuss your medicines    Learn about your test results    Speak to your doctor            Additional Information About Your Visit        Cloud[employer] Information     [employer] gives you secure access to your electronic health record. If you see a primary care provider, you can also send messages to your care team and make appointments. If you have questions, please call your primary care clinic.  If you do not have a primary care provider,  please call [phone] and they will assist you.      FND is an electronic gateway that provides easy, online access to your medical records. With FND, you can request a clinic appointment, read your test results, renew a prescription or communicate with your care team.     To access your existing account, please contact your [facility] or call [phone] for assistance.        Care EveryWhere ID     This is your Care EveryWhere ID. This could be used by other organizations to access your [place] medical records  [id number]         Blood Pressure from Last 3 Encounters:   04/11/18 125/79   03/20/18 (!) 140/93   03/07/18 135/85    Weight from Last 3 Encounters:   04/11/18 90.1 kg (198 lb 11.2 oz)   03/20/18 99 kg (218 lb 3.2 oz)   03/07/18 93.4 kg (206 lb)              Today, you had the following     No orders found for display         Today's Medication Changes          These changes are accurate as of 4/11/18  2:25 PM.  If you have any questions, ask your nurse or doctor.               These medicines have changed or have updated prescriptions.        Dose/Directions    cyanocobalamin 1000 MCG tablet   Commonly known as:  vitamin  B-12   This may have changed:  when to take this   Used for:  Alcoholic cirrhosis of liver with ascites (H)        Dose:  1000 mcg   1 tablet (1,000 mcg) by Per G Tube route daily   Quantity:  130 tablet   Refills:  2       levothyroxine 88 MCG tablet   Commonly known as:  SYNTHROID/LEVOTHROID   This may have changed:  when to take this   Used for:  Hypothyroidism        Dose:  88 mcg   Take 1 tablet (88 mcg) by mouth daily   Quantity:  90 tablet   Refills:  3                Primary Care Provider Office Phone # Fax #    [place] [phone]        [address] [address]        Equal Access to Services     SAMUEL FAIR AH: olesya Holliday qaybta kaalmada adeegyada,  josue headleybrian matias'aan ah. So [facility] [phone].    ATENCIÓN: Si habla jose juan, tiene a chiang disposición servicios gratuitos de asistencia lingüística. Manuelito betancourt [phone].    We comply with applicable federal civil rights laws and [place] laws. We do not discriminate on the basis of race, color, national origin, age, disability, sex, sexual orientation, or gender identity.            Thank you!     Thank you for choosing [facility]  for your care. Our goal is always to provide you with excellent care. Hearing back from our patients is one way we can continue to improve our services. Please take a few minutes to complete the written survey that you may receive in the mail after your visit with us. Thank you!             Your Updated Medication List - Protect others around you: Learn how to safely use, store and throw away your medicines at www.disposemymeds.org.          This list is accurate as of 4/11/18  2:25 PM.  Always use your most recent med list.                   Brand Name Dispense Instructions for use Diagnosis    blood glucose monitoring test strip    ONETOUCH ULTRA    400 each    Use to test blood sugars 4 times daily as needed or as directed.    Diabetes mellitus, type 2 (H)       Calcium Carb-Cholecalciferol 500-400 MG-UNIT Tabs    calcium 500 +D    90 tablet    Take 500 mg by mouth daily    Malnutrition (H)       CANE, ANY MATERIAL     1 each    One cane    Balance disorder       ciclopirox 0.77 % cream    LOPROX    90 g    Apply topically 2 times daily To feet and toenails.    Tinea pedis of both feet       cyanocobalamin 1000 MCG tablet    vitamin  B-12    130 tablet    1 tablet (1,000 mcg) by Per G Tube route daily    Alcoholic cirrhosis of liver with ascites (H)       dulaglutide 1.5 MG/0.5ML pen    TRULICITY    6 mL    Inject 1.5 mg Subcutaneous every 7 days    DM type 2, goal HbA1c 7%-8% (H)       ferrous sulfate 325 (65 Fe) MG tablet    IRON     200 tablet    Take 1 tablet (325 mg) by mouth 2 times daily    Other iron deficiency anemia       gabapentin 100 MG capsule    NEURONTIN    30 capsule    Take 1 capsule (100 mg) by mouth At Bedtime    Mild episode of recurrent major depressive disorder (H)       glipiZIDE 10 MG 24 hr tablet    GLUCOTROL XL    90 tablet    Take 1 tablet (10 mg) by mouth daily    DM type 2, goal HbA1c 7%-8% (H)       HYDROcodone-acetaminophen 5-325 MG per tablet    NORCO    60 tablet    Take 2 tablets by mouth every 6 hours as needed for moderate to severe pain    Brain tumor (H)       hydrOXYzine 25 MG tablet    ATARAX    180 tablet    Take 1 tablet (25 mg) by mouth 2 times daily    Itching, Anxiety       lactulose 10 GM/15ML solution    CHRONULAC    473 mL    Take 7.5 mLs (5 g) by mouth 2 times daily    Hepatic encephalopathy (H)       levothyroxine 88 MCG tablet    SYNTHROID/LEVOTHROID    90 tablet    Take 1 tablet (88 mcg) by mouth daily    Hypothyroidism       lidocaine 4 % Crea cream    LMX4    133 g    Apply topically once as needed for mild pain    Port catheter in place       methylphenidate 10 MG tablet    RITALIN    60 tablet    10mg twice a day    Major depressive disorder with single episode, in partial remission (H)       mirtazapine 45 MG tablet    REMERON    30 tablet    Take 1 tablet (45 mg) by mouth At Bedtime    Major depressive disorder with single episode, in partial remission (H)       multivitamin, therapeutic with minerals Tabs tablet      Take 1 tablet by mouth 2 times daily        mupirocin 2 % ointment    BACTROBAN    22 g    Use in nose 2-3 times per week    Epistaxis       omeprazole 20 MG CR capsule    priLOSEC    360 capsule    Take 2 capsules (40 mg) by mouth 2 times daily    Gastroesophageal reflux disease without esophagitis       ONETOUCH LANCETS Misc     100 each    by In Vitro route 4 times daily as needed    Type II or unspecified type diabetes mellitus without mention of complication, not stated  as uncontrolled       phenytoin 30 MG CR capsule    DILANTIN    720 capsule    Take 4 capsules (120 mg) by mouth 2 times daily    Oligoastrocytoma of parietal lobe (H)       pravastatin 80 MG tablet    PRAVACHOL    90 tablet    Take 1 tablet (80 mg) by mouth daily    High cholesterol       rifaximin 550 MG Tabs tablet    XIFAXAN    60 tablet    Take 1 tablet (550 mg) by mouth 2 times daily    Hepatic encephalopathy (H)       simethicone 80 MG chewable tablet    MYLICON    60 tablet    Take 1 tablet (80 mg) by mouth every 6 hours as needed for cramping    Abdominal cramping       sulfamethoxazole-trimethoprim 800-160 MG per tablet    BACTRIM DS/SEPTRA DS    30 tablet    Take 1 tablet by mouth daily    Spontaneous bacterial peritonitis (H)       thiamine 100 MG tablet     100 tablet    Take 1 tablet (100 mg) by mouth daily    Alcoholic cirrhosis of liver with ascites (H)       traZODone 50 MG tablet    DESYREL    30 tablet    Take 1 tablet (50 mg) by mouth nightly as needed for sleep    Primary insomnia

## 2018-04-12 DIAGNOSIS — E03.9 HYPOTHYROIDISM: ICD-10-CM

## 2018-04-12 DIAGNOSIS — K65.2 SPONTANEOUS BACTERIAL PERITONITIS (H): ICD-10-CM

## 2018-04-12 DIAGNOSIS — R10.9 ABDOMINAL CRAMPING: ICD-10-CM

## 2018-04-12 LAB — TRANSF REACT PLASRBC-IMP: NORMAL

## 2018-04-12 RX ORDER — SIMETHICONE 80 MG
80 TABLET,CHEWABLE ORAL EVERY 6 HOURS PRN
Qty: 60 TABLET | Refills: 1 | Status: SHIPPED | OUTPATIENT
Start: 2018-04-12 | End: 2018-04-13

## 2018-04-12 RX ORDER — LEVOTHYROXINE SODIUM 88 UG/1
88 TABLET ORAL DAILY
Qty: 90 TABLET | Refills: 1 | Status: ON HOLD | OUTPATIENT
Start: 2018-04-12 | End: 2018-01-01

## 2018-04-12 RX ORDER — SULFAMETHOXAZOLE/TRIMETHOPRIM 800-160 MG
1 TABLET ORAL DAILY
Qty: 30 TABLET | Refills: 5 | Status: ON HOLD | OUTPATIENT
Start: 2018-04-12 | End: 2018-01-01

## 2018-04-12 NOTE — TELEPHONE ENCOUNTER
levothyroxine      Last Written Prescription Date:  3/14/17  Last Fill Quantity: 90   # refills: 3  mylicon   Last Written Prescription Date:  2/1/18  Last Fill Quantity: 60,   # refills: 1  septra     Last Written Prescription Date:  historical  Last Office Visit : 2/9/18  Future Office visit:  No future aoot    Routing refill request to nurse for review/approval because:  Failed protocol elevated TSH  Septra historical/associated diagnosis also needed  Mylicon, unsure if this is to be continued

## 2018-04-13 ENCOUNTER — TELEPHONE (OUTPATIENT)
Dept: GASTROENTEROLOGY | Facility: CLINIC | Age: 48
End: 2018-04-13

## 2018-04-13 DIAGNOSIS — R10.9 ABDOMINAL CRAMPING: ICD-10-CM

## 2018-04-13 NOTE — TELEPHONE ENCOUNTER
Spoke to wife, Yisel.  She says just wanted to make sure that Dr. Alcazar was made aware of patient's complex medical history before seeing him. Informed patient that Dr. Tanner will be in the clinic as well to address any issues or questions Dr. Alcazar has regarding patient's care.  Wife expressed understanding and had no further questions.

## 2018-04-13 NOTE — TELEPHONE ENCOUNTER
Health Call Center    Phone Message    May a detailed message be left on voicemail: yes    Reason for Call: Symptoms or Concerns     If patient has red-flag symptoms, warm transfer to triage line    Current symptom or concern: Liver issues getting worse    Symptoms have been present for:  several week(s)    Has patient previously been seen for this? Yes    By Beatriz Tanner    Date: 4/13/18    Are there any new or worsening symptoms? Yes:       Action Taken: The appt. On 4/17/18 with Dr Tanner was changed by the clinic to Dr. Jennifer Alcazar on 4/25/18 - Pt spouse is requesting that this Dr thoroughly review the Pt's chart before the appt. due to the Pt's symptoms getting worse and they have several questions they need answered during the appt. She wasn't happy that Dr Tanner didn't have another appt. available until Oct. 2018

## 2018-04-14 RX ORDER — SIMETHICONE 80 MG
80 TABLET,CHEWABLE ORAL EVERY 6 HOURS PRN
Qty: 100 TABLET | Refills: 1 | Status: ON HOLD | OUTPATIENT
Start: 2018-04-14 | End: 2018-01-01

## 2018-04-15 NOTE — TELEPHONE ENCOUNTER
"simethicone (MYLICON) 80 MG chewable tablet        Fax received from [facility] that reads, \"Please send for ; Medicaid requires a full bottle size\". (  "

## 2018-04-16 ENCOUNTER — SURGERY (OUTPATIENT)
Age: 48
End: 2018-04-16

## 2018-04-16 ENCOUNTER — HOSPITAL ENCOUNTER (OUTPATIENT)
Facility: CLINIC | Age: 48
Discharge: HOME | End: 2018-04-16
Attending: INTERNAL MEDICINE | Admitting: INTERNAL MEDICINE
Payer: MEDICARE

## 2018-04-16 ENCOUNTER — ANESTHESIA (OUTPATIENT)
Dept: SURGERY | Facility: CLINIC | Age: 48
End: 2018-04-16
Payer: MEDICARE

## 2018-04-16 VITALS
BODY MASS INDEX: 28.72 KG/M2 | SYSTOLIC BLOOD PRESSURE: 132 MMHG | WEIGHT: 200.62 LBS | RESPIRATION RATE: 16 BRPM | DIASTOLIC BLOOD PRESSURE: 95 MMHG | TEMPERATURE: 98.2 F | OXYGEN SATURATION: 100 % | HEIGHT: 70 IN

## 2018-04-16 LAB
GLUCOSE BLDC GLUCOMTR-MCNC: 134 MG/DL (ref 70–99)
GLUCOSE BLDC GLUCOMTR-MCNC: 80 MG/DL (ref 70–99)
GLUCOSE BLDC GLUCOMTR-MCNC: 84 MG/DL (ref 70–99)

## 2018-04-16 PROCEDURE — 71000013 ZZH RECOVERY PHASE 1 LEVEL 1 EA ADDTL HR: Performed by: INTERNAL MEDICINE

## 2018-04-16 PROCEDURE — 36000053 ZZH SURGERY LEVEL 2 EA 15 ADDTL MIN - UMMC: Performed by: INTERNAL MEDICINE

## 2018-04-16 PROCEDURE — 71000027 ZZH RECOVERY PHASE 2 EACH 15 MINS: Performed by: INTERNAL MEDICINE

## 2018-04-16 PROCEDURE — C9399 UNCLASSIFIED DRUGS OR BIOLOG: HCPCS | Performed by: NURSE ANESTHETIST, CERTIFIED REGISTERED

## 2018-04-16 PROCEDURE — 36000051 ZZH SURGERY LEVEL 2 1ST 30 MIN - UMMC: Performed by: INTERNAL MEDICINE

## 2018-04-16 PROCEDURE — 25000128 H RX IP 250 OP 636: Performed by: NURSE ANESTHETIST, CERTIFIED REGISTERED

## 2018-04-16 PROCEDURE — 27210995 ZZH RX 272: Performed by: INTERNAL MEDICINE

## 2018-04-16 PROCEDURE — 27210794 ZZH OR GENERAL SUPPLY STERILE: Performed by: INTERNAL MEDICINE

## 2018-04-16 PROCEDURE — 40000170 ZZH STATISTIC PRE-PROCEDURE ASSESSMENT II: Performed by: INTERNAL MEDICINE

## 2018-04-16 PROCEDURE — 37000009 ZZH ANESTHESIA TECHNICAL FEE, EACH ADDTL 15 MIN: Performed by: INTERNAL MEDICINE

## 2018-04-16 PROCEDURE — P9041 ALBUMIN (HUMAN),5%, 50ML: HCPCS | Performed by: NURSE ANESTHETIST, CERTIFIED REGISTERED

## 2018-04-16 PROCEDURE — A9270 NON-COVERED ITEM OR SERVICE: HCPCS | Performed by: INTERNAL MEDICINE

## 2018-04-16 PROCEDURE — 82962 GLUCOSE BLOOD TEST: CPT

## 2018-04-16 PROCEDURE — 37000008 ZZH ANESTHESIA TECHNICAL FEE, 1ST 30 MIN: Performed by: INTERNAL MEDICINE

## 2018-04-16 PROCEDURE — 71000012 ZZH RECOVERY PHASE 1 LEVEL 1 FIRST HR: Performed by: INTERNAL MEDICINE

## 2018-04-16 PROCEDURE — 25000565 ZZH ISOFLURANE, EA 15 MIN: Performed by: INTERNAL MEDICINE

## 2018-04-16 PROCEDURE — 25000128 H RX IP 250 OP 636: Performed by: ANESTHESIOLOGY

## 2018-04-16 PROCEDURE — 25000125 ZZHC RX 250: Performed by: INTERNAL MEDICINE

## 2018-04-16 PROCEDURE — 25000125 ZZHC RX 250: Performed by: NURSE ANESTHETIST, CERTIFIED REGISTERED

## 2018-04-16 RX ORDER — ONDANSETRON 4 MG/1
4 TABLET, ORALLY DISINTEGRATING ORAL EVERY 30 MIN PRN
Status: DISCONTINUED | OUTPATIENT
Start: 2018-04-16 | End: 2018-04-16 | Stop reason: HOSPADM

## 2018-04-16 RX ORDER — DIMENHYDRINATE 50 MG/ML
25 INJECTION, SOLUTION INTRAMUSCULAR; INTRAVENOUS
Status: DISCONTINUED | OUTPATIENT
Start: 2018-04-16 | End: 2018-04-16 | Stop reason: HOSPADM

## 2018-04-16 RX ORDER — PROPOFOL 10 MG/ML
INJECTION, EMULSION INTRAVENOUS PRN
Status: DISCONTINUED | OUTPATIENT
Start: 2018-04-16 | End: 2018-04-16

## 2018-04-16 RX ORDER — ONDANSETRON 2 MG/ML
INJECTION INTRAMUSCULAR; INTRAVENOUS PRN
Status: DISCONTINUED | OUTPATIENT
Start: 2018-04-16 | End: 2018-04-16

## 2018-04-16 RX ORDER — FENTANYL CITRATE 50 UG/ML
25-50 INJECTION, SOLUTION INTRAMUSCULAR; INTRAVENOUS
Status: DISCONTINUED | OUTPATIENT
Start: 2018-04-16 | End: 2018-04-16 | Stop reason: HOSPADM

## 2018-04-16 RX ORDER — LIDOCAINE 40 MG/G
CREAM TOPICAL
Status: DISCONTINUED | OUTPATIENT
Start: 2018-04-16 | End: 2018-04-16 | Stop reason: HOSPADM

## 2018-04-16 RX ORDER — DEXAMETHASONE SODIUM PHOSPHATE 4 MG/ML
4 INJECTION, SOLUTION INTRA-ARTICULAR; INTRALESIONAL; INTRAMUSCULAR; INTRAVENOUS; SOFT TISSUE EVERY 10 MIN PRN
Status: DISCONTINUED | OUTPATIENT
Start: 2018-04-16 | End: 2018-04-16 | Stop reason: HOSPADM

## 2018-04-16 RX ORDER — NALOXONE HYDROCHLORIDE 0.4 MG/ML
.1-.4 INJECTION, SOLUTION INTRAMUSCULAR; INTRAVENOUS; SUBCUTANEOUS
Status: DISCONTINUED | OUTPATIENT
Start: 2018-04-16 | End: 2018-04-16 | Stop reason: HOSPADM

## 2018-04-16 RX ORDER — ONDANSETRON 2 MG/ML
4 INJECTION INTRAMUSCULAR; INTRAVENOUS EVERY 30 MIN PRN
Status: DISCONTINUED | OUTPATIENT
Start: 2018-04-16 | End: 2018-04-16 | Stop reason: HOSPADM

## 2018-04-16 RX ORDER — SODIUM CHLORIDE, SODIUM LACTATE, POTASSIUM CHLORIDE, CALCIUM CHLORIDE 600; 310; 30; 20 MG/100ML; MG/100ML; MG/100ML; MG/100ML
INJECTION, SOLUTION INTRAVENOUS CONTINUOUS
Status: DISCONTINUED | OUTPATIENT
Start: 2018-04-16 | End: 2018-04-16 | Stop reason: HOSPADM

## 2018-04-16 RX ORDER — FLUMAZENIL 0.1 MG/ML
0.2 INJECTION, SOLUTION INTRAVENOUS
Status: DISCONTINUED | OUTPATIENT
Start: 2018-04-16 | End: 2018-04-16 | Stop reason: HOSPADM

## 2018-04-16 RX ORDER — ONDANSETRON 2 MG/ML
4 INJECTION INTRAMUSCULAR; INTRAVENOUS
Status: DISCONTINUED | OUTPATIENT
Start: 2018-04-16 | End: 2018-04-16 | Stop reason: HOSPADM

## 2018-04-16 RX ORDER — HYDROMORPHONE HCL/0.9% NACL/PF 0.2MG/0.2
0.2 SYRINGE (ML) INTRAVENOUS EVERY 10 MIN PRN
Status: DISCONTINUED | OUTPATIENT
Start: 2018-04-16 | End: 2018-04-16 | Stop reason: HOSPADM

## 2018-04-16 RX ORDER — SODIUM CHLORIDE 9 MG/ML
INJECTION, SOLUTION INTRAVENOUS CONTINUOUS PRN
Status: DISCONTINUED | OUTPATIENT
Start: 2018-04-16 | End: 2018-04-16

## 2018-04-16 RX ORDER — LIDOCAINE HYDROCHLORIDE 20 MG/ML
INJECTION, SOLUTION INFILTRATION; PERINEURAL PRN
Status: DISCONTINUED | OUTPATIENT
Start: 2018-04-16 | End: 2018-04-16

## 2018-04-16 RX ORDER — GLYCOPYRROLATE 0.2 MG/ML
INJECTION, SOLUTION INTRAMUSCULAR; INTRAVENOUS PRN
Status: DISCONTINUED | OUTPATIENT
Start: 2018-04-16 | End: 2018-04-16

## 2018-04-16 RX ORDER — HEPARIN SODIUM (PORCINE) LOCK FLUSH IV SOLN 100 UNIT/ML 100 UNIT/ML
500 SOLUTION INTRAVENOUS ONCE
Status: COMPLETED | OUTPATIENT
Start: 2018-04-16 | End: 2018-04-16

## 2018-04-16 RX ORDER — FENTANYL CITRATE 50 UG/ML
INJECTION, SOLUTION INTRAMUSCULAR; INTRAVENOUS PRN
Status: DISCONTINUED | OUTPATIENT
Start: 2018-04-16 | End: 2018-04-16

## 2018-04-16 RX ORDER — ALBUMIN, HUMAN INJ 5% 5 %
SOLUTION INTRAVENOUS CONTINUOUS PRN
Status: DISCONTINUED | OUTPATIENT
Start: 2018-04-16 | End: 2018-04-16

## 2018-04-16 RX ADMIN — SODIUM CHLORIDE: 9 INJECTION, SOLUTION INTRAVENOUS at 10:00

## 2018-04-16 RX ADMIN — SIMETHICONE 4 ML: 63.3; 3.7 SOLUTION/ DROPS ORAL at 10:03

## 2018-04-16 RX ADMIN — SUGAMMADEX 200 MG: 100 INJECTION, SOLUTION INTRAVENOUS at 10:55

## 2018-04-16 RX ADMIN — WATER 100 ML: 100 IRRIGANT IRRIGATION at 10:50

## 2018-04-16 RX ADMIN — ROCURONIUM BROMIDE 50 MG: 10 INJECTION INTRAVENOUS at 10:28

## 2018-04-16 RX ADMIN — ALBUMIN HUMAN: 0.05 INJECTION, SOLUTION INTRAVENOUS at 10:40

## 2018-04-16 RX ADMIN — ONDANSETRON 4 MG: 2 INJECTION INTRAMUSCULAR; INTRAVENOUS at 10:53

## 2018-04-16 RX ADMIN — GLYCOPYRROLATE 0.2 MG: 0.2 INJECTION, SOLUTION INTRAMUSCULAR; INTRAVENOUS at 10:28

## 2018-04-16 RX ADMIN — FENTANYL CITRATE 100 MCG: 50 INJECTION, SOLUTION INTRAMUSCULAR; INTRAVENOUS at 10:28

## 2018-04-16 RX ADMIN — PROPOFOL 150 MG: 10 INJECTION, EMULSION INTRAVENOUS at 10:28

## 2018-04-16 RX ADMIN — HEPARIN SODIUM (PORCINE) LOCK FLUSH IV SOLN 100 UNIT/ML 500 UNITS: 100 SOLUTION at 13:18

## 2018-04-16 RX ADMIN — LIDOCAINE HYDROCHLORIDE 40 MG: 20 INJECTION, SOLUTION INFILTRATION; PERINEURAL at 10:28

## 2018-04-16 NOTE — IP AVS SNAPSHOT
MRN:[id number]                      After Visit Summary   4/16/2018    Mono Varghese    MRN: [id number]           Thank you!     Thank you for choosing [facility] for your care. Our goal is always to provide you with excellent care. Hearing back from our patients is one way we can continue to improve our services. Please take a few minutes to complete the written survey that you may receive in the mail after you visit with us. Thank you!        Patient Information     Date Of Birth          1970        About your hospital stay     You were admitted on:  April 16, 2018 You last received care in the:  Same Day Surgery [facility]    You were discharged on:  April 16, 2018       Who to Call     For medical emergencies, please call 911.  For non-urgent questions about your medical care, please call your primary care provider or clinic, [phone]  For questions related to your surgery, please call your surgery clinic        Attending Provider     Provider Guru Jose So MD Gastroenterology       Primary Care Provider Office Phone # Fax #    [place] [phone]      After Care Instructions     Discharge Instructions       No driving or operating machinery until the day after procedure.            Discharge Instructions       Recommend that a responsible adult remain with the patient at home for 24 hours post discharge.            Discharge Instructions       Start with clear liquids, sips of water 1 hour after procedure. If no abdominal pain and gag reflex has returned, advance as tolerated to pre-procedure diet.              Discharge Instructions       Restart home medications.            Discharge Instructions       Check with your Provider when to start anticoagulant medication.            Discharge Instructions       No ALCOHOL 24 hours post procedure.                  Your next 10 appointments already scheduled     Apr 25,  2018  9:30 AM CDT   Lab with  LAB   [facility] ([facility])    [address]  1st Floor  [place] [address]   [phone]            Apr 25, 2018 10:30 AM CDT   (Arrive by 10:15 AM)   Return General Liver with Jennifer Alcazar MD   [facility] ([facility])    [address]  [address]  [place] [address]   [phone]            Apr 27, 2018 11:30 AM CDT   (Arrive by 11:15 AM)   MR BRAIN W/O & W CONTRAST with [address] [facility] ([facility])    [address]  1st Floor  [place] [address] [phone]           Take your medicines as usual, unless your doctor tells you not to. Bring a list of your current medicines to your exam (including vitamins, minerals and over-the-counter drugs).  You may or may not receive intravenous (IV) contrast for this exam pending the discretion of the Radiologist.  You do not need to do anything special to prepare.  The MRI machine uses a strong magnet. Please wear clothes without metal (snaps, zippers). A sweatsuit works well, or we may give you a hospital gown.  Please remove any body piercings and hair extensions before you arrive. You will also remove watches, jewelry, hairpins, wallets, dentures, partial dental plates and hearing aids. You may wear contact lenses, and you may be able to wear your rings. We have a safe place to keep your personal items, but it is safer to leave them at home.  **IMPORTANT** THE INSTRUCTIONS BELOW ARE ONLY FOR THOSE PATIENTS WHO HAVE BEEN PRESCRIBED SEDATION OR GENERAL ANESTHESIA DURING THEIR MRI PROCEDURE:  IF YOUR DOCTOR PRESCRIBED ORAL SEDATION (take medicine to help you relax during your exam):   You must get the medicine from your doctor (oral medication) before you arrive. Bring the medicine to the exam. Do not take it at home. You ll be told when to take it upon arriving for your exam.    Arrive one hour early. Bring someone who can take you home after the test. Your medicine will make you sleepy. After the exam, you may not drive, take a bus or take a taxi by yourself.  IF YOUR DOCTOR PRESCRIBED IV SEDATION:   Arrive one hour early. Bring someone who can take you home after the test. Your medicine will make you sleepy. After the exam, you may not drive, take a bus or take a taxi by yourself.   No eating 6 hours before your exam. You may have clear liquids up until 4 hours before your exam. (Clear liquids include water, clear tea, black coffee and fruit juice without pulp.)  IF YOUR DOCTOR PRESCRIBED ANESTHESIA (be asleep for your exam):   Arrive 1 1/2 hours early. Bring someone who can take you home after the test. You may not drive, take a bus or take a taxi by yourself.   No eating 8 hours before your exam. You may have clear liquids up until 4 hours before your exam. (Clear liquids include water, clear tea, black coffee and fruit juice without pulp.)   You will spend four to five hours in the recovery room.  Please call the Imaging Department at your exam site with any questions.            May 04, 2018 10:45 AM CDT   (Arrive by 10:30 AM)   Return Visit with Lauro Shaw MD   [facility] ([facility])    [address] [place] [address]  [address] [place] [id number] [phone]            May 21, 2018  9:30 AM CDT   [facility]    [address] [place] [id number] [phone]            Sep 04, 2018 12:30 PM CDT   (Arrive by 12:15 PM)   Return Seizure with Anil English MD   [facility] ([facility])    [address] [place] [address]  3rd Floor  [facility] [phone] [phone]              Further instructions from your care team       [facility]  Same-Day Surgery   Adult Discharge  Orders & Instructions     For 24 hours after surgery    1. Get plenty of rest.  A responsible adult must stay with you for at least 24 hours after you leave the hospital.   2. Do not drive or use heavy equipment.  If you have weakness or tingling, don't drive or use heavy equipment until this feeling goes away.  3. Do not drink alcohol.  4. Avoid strenuous or risky activities.  Ask for help when climbing stairs.   5. You may feel lightheaded.  IF so, sit for a few minutes before standing.  Have someone help you get up.   6. If you have nausea (feel sick to your stomach): Drink only clear liquids such as apple juice, ginger ale, broth or 7-Up.  Rest may also help.  Be sure to drink enough fluids.  Move to a regular diet as you feel able.  7. You may have a slight fever. Call the doctor if your fever is over 100 F (37.7 C) (taken under the tongue) or lasts longer than 24 hours.  8. You may have a dry mouth, a sore throat, muscle aches or trouble sleeping.  These should go away after 24 hours.  9. Do not make important or legal decisions.   Call your doctor for any of the followin.  Signs of infection (fever, growing tenderness at the surgery site, a large amount of drainage or bleeding, severe pain, foul-smelling drainage, redness, swelling).    2. It has been over 8 to 10 hours since surgery and you are still not able to urinate (pass water).    3.  Headache for over 24 hours.      To contact a doctor, call Dr. Osorio's clinic at #[phone] or:        [phone] and ask for the resident on call for surgery/gastroenterology (answered 24 hours a day)      Emergency Department:    [facility] [place] [facility]: [phone]       (TTY for hearing impaired: [phone])              Pending Results     No orders found from 2018 to 2018.            Admission Information     Date & Time Provider Department Dept. Phone    2018 Guru Jose Kelly MD Same Day Surgery [facility]  "[phone]      Your Vitals Were     Blood Pressure Temperature Respirations Height Weight Pulse Oximetry    140/97 98.4  F (36.9  C) (Oral) 15 1.79 m (5' 10.47\") 91 kg (200 lb 9.9 oz) 98%    BMI (Body Mass Index)                   28.4 kg/m2           [employer] Information     [facility] gives you secure access to your electronic health record. If you see a primary care provider, you can also send messages to your care team and make appointments. If you have questions, please call your primary care clinic.  If you do not have a primary care provider, please call [phone] and they will assist you.        Care EveryWhere ID     This is your Care EveryWhere ID. This could be used by other organizations to access your [facility] medical records  [id number]        Equal Access to Services     SAMUEL FAIR : Jennifer Thomas, olesya martinez, josue mario. So [facility] [phone].    ATENCIÓN: Si habla español, tiene a chiang disposición servicios gratuitos de asistencia lingüística. Manuelito al [phone].    We comply with applicable federal civil rights laws and [place] laws. We do not discriminate on the basis of race, color, national origin, age, disability, sex, sexual orientation, or gender identity.               Review of your medicines      CONTINUE these medicines which may have CHANGED, or have new prescriptions. If we are uncertain of the size of tablets/capsules you have at home, strength may be listed as something that might have changed.        Dose / Directions    cyanocobalamin 1000 MCG tablet   Commonly known as:  vitamin  B-12   This may have changed:  when to take this   Used for:  Alcoholic cirrhosis of liver with ascites (H)        Dose:  1000 mcg   1 tablet (1,000 mcg) by Per G Tube route daily   Quantity:  130 tablet   Refills:  2         CONTINUE these medicines which have NOT CHANGED        Dose / Directions    blood glucose " monitoring test strip   Commonly known as:  ONETOUCH ULTRA   Used for:  Diabetes mellitus, type 2 (H)        Use to test blood sugars 4 times daily as needed or as directed.   Quantity:  400 each   Refills:  1       Calcium Carb-Cholecalciferol 500-400 MG-UNIT Tabs   Commonly known as:  calcium 500 +D   Used for:  Malnutrition (H)        Dose:  500 mg   Take 500 mg by mouth daily   Quantity:  90 tablet   Refills:  1       CANE, ANY MATERIAL   Used for:  Balance disorder        One cane   Quantity:  1 each   Refills:  0       ciclopirox 0.77 % cream   Commonly known as:  LOPROX   Used for:  Tinea pedis of both feet        Apply topically 2 times daily To feet and toenails.   Quantity:  90 g   Refills:  4       dulaglutide 1.5 MG/0.5ML pen   Commonly known as:  TRULICITY   Used for:  DM type 2, goal HbA1c 7%-8% (H)        Dose:  1.5 mg   Inject 1.5 mg Subcutaneous every 7 days   Quantity:  6 mL   Refills:  0       ferrous sulfate 325 (65 Fe) MG tablet   Commonly known as:  IRON   Used for:  Other iron deficiency anemia        Dose:  1 tablet   Take 1 tablet (325 mg) by mouth 2 times daily   Quantity:  200 tablet   Refills:  3       gabapentin 100 MG capsule   Commonly known as:  NEURONTIN   Used for:  Mild episode of recurrent major depressive disorder (H)        Dose:  100 mg   Take 1 capsule (100 mg) by mouth At Bedtime   Quantity:  30 capsule   Refills:  3       glipiZIDE 10 MG 24 hr tablet   Commonly known as:  GLUCOTROL XL   Used for:  DM type 2, goal HbA1c 7%-8% (H)        Dose:  10 mg   Take 1 tablet (10 mg) by mouth daily   Quantity:  90 tablet   Refills:  1       HYDROcodone-acetaminophen 5-325 MG per tablet   Commonly known as:  NORCO   Used for:  Brain tumor (H)        Dose:  2 tablet   Take 2 tablets by mouth every 6 hours as needed for moderate to severe pain   Quantity:  60 tablet   Refills:  0       hydrOXYzine 25 MG tablet   Commonly known as:  ATARAX   Used for:  Itching, Anxiety        Dose:  25 mg    Take 1 tablet (25 mg) by mouth 2 times daily   Quantity:  180 tablet   Refills:  0       lactulose 10 GM/15ML solution   Commonly known as:  CHRONULAC   Used for:  Hepatic encephalopathy (H)        Dose:  5 g   Take 7.5 mLs (5 g) by mouth 2 times daily   Quantity:  473 mL   Refills:  3       levothyroxine 88 MCG tablet   Commonly known as:  SYNTHROID/LEVOTHROID   Used for:  Hypothyroidism        Dose:  88 mcg   Take 1 tablet (88 mcg) by mouth daily   Quantity:  90 tablet   Refills:  1       lidocaine 4 % Crea cream   Commonly known as:  LMX4   Used for:  Port catheter in place        Apply topically once as needed for mild pain   Quantity:  133 g   Refills:  1       methylphenidate 10 MG tablet   Commonly known as:  RITALIN   Used for:  Major depressive disorder with single episode, in partial remission (H)        10mg twice a day   Quantity:  60 tablet   Refills:  0       mirtazapine 45 MG tablet   Commonly known as:  REMERON   Used for:  Major depressive disorder with single episode, in partial remission (H)        Dose:  45 mg   Take 1 tablet (45 mg) by mouth At Bedtime   Quantity:  30 tablet   Refills:  3       multivitamin, therapeutic with minerals Tabs tablet        Dose:  1 tablet   Take 1 tablet by mouth 2 times daily   Refills:  0       mupirocin 2 % ointment   Commonly known as:  BACTROBAN   Used for:  Epistaxis        Use in nose 2-3 times per week   Quantity:  22 g   Refills:  0       omeprazole 20 MG CR capsule   Commonly known as:  priLOSEC   Used for:  Gastroesophageal reflux disease without esophagitis        Dose:  40 mg   Take 2 capsules (40 mg) by mouth 2 times daily   Quantity:  360 capsule   Refills:  3       ONETOUCH LANCETS Misc   Used for:  Type II or unspecified type diabetes mellitus without mention of complication, not stated as uncontrolled        by In Vitro route 4 times daily as needed   Quantity:  100 each   Refills:  prn       phenytoin 30 MG CR capsule   Commonly known as:   DILANTIN   Used for:  Oligoastrocytoma of parietal lobe (H)        Dose:  120 mg   Take 4 capsules (120 mg) by mouth 2 times daily   Quantity:  720 capsule   Refills:  11       pravastatin 80 MG tablet   Commonly known as:  PRAVACHOL   Used for:  High cholesterol        Dose:  80 mg   Take 1 tablet (80 mg) by mouth daily   Quantity:  90 tablet   Refills:  3       rifaximin 550 MG Tabs tablet   Commonly known as:  XIFAXAN   Used for:  Hepatic encephalopathy (H)        Dose:  550 mg   Take 1 tablet (550 mg) by mouth 2 times daily   Quantity:  60 tablet   Refills:  11       simethicone 80 MG chewable tablet   Commonly known as:  MYLICON   Used for:  Abdominal cramping        Dose:  80 mg   Take 1 tablet (80 mg) by mouth every 6 hours as needed for cramping   Quantity:  100 tablet   Refills:  1       sulfamethoxazole-trimethoprim 800-160 MG per tablet   Commonly known as:  BACTRIM DS/SEPTRA DS   Used for:  Spontaneous bacterial peritonitis (H)        Dose:  1 tablet   Take 1 tablet by mouth daily   Quantity:  30 tablet   Refills:  5       thiamine 100 MG tablet   Used for:  Alcoholic cirrhosis of liver with ascites (H)        Dose:  100 mg   Take 1 tablet (100 mg) by mouth daily   Quantity:  100 tablet   Refills:  1       traZODone 50 MG tablet   Commonly known as:  DESYREL   Used for:  Primary insomnia        Dose:  50 mg   Take 1 tablet (50 mg) by mouth nightly as needed for sleep   Quantity:  30 tablet   Refills:  3                Protect others around you: Learn how to safely use, store and throw away your medicines at www.disposemymeds.org.             Medication List: This is a list of all your medications and when to take them. Check marks below indicate your daily home schedule. Keep this list as a reference.      Medications           Morning Afternoon Evening Bedtime As Needed    blood glucose monitoring test strip   Commonly known as:  ONETOUCH ULTRA   Use to test blood sugars 4 times daily as needed or as  directed.                                Calcium Carb-Cholecalciferol 500-400 MG-UNIT Tabs   Commonly known as:  calcium 500 +D   Take 500 mg by mouth daily                                CANE, ANY MATERIAL   One cane                                ciclopirox 0.77 % cream   Commonly known as:  LOPROX   Apply topically 2 times daily To feet and toenails.                                cyanocobalamin 1000 MCG tablet   Commonly known as:  vitamin  B-12   1 tablet (1,000 mcg) by Per G Tube route daily                                dulaglutide 1.5 MG/0.5ML pen   Commonly known as:  TRULICITY   Inject 1.5 mg Subcutaneous every 7 days                                ferrous sulfate 325 (65 Fe) MG tablet   Commonly known as:  IRON   Take 1 tablet (325 mg) by mouth 2 times daily                                gabapentin 100 MG capsule   Commonly known as:  NEURONTIN   Take 1 capsule (100 mg) by mouth At Bedtime                                glipiZIDE 10 MG 24 hr tablet   Commonly known as:  GLUCOTROL XL   Take 1 tablet (10 mg) by mouth daily                                HYDROcodone-acetaminophen 5-325 MG per tablet   Commonly known as:  NORCO   Take 2 tablets by mouth every 6 hours as needed for moderate to severe pain                                hydrOXYzine 25 MG tablet   Commonly known as:  ATARAX   Take 1 tablet (25 mg) by mouth 2 times daily                                lactulose 10 GM/15ML solution   Commonly known as:  CHRONULAC   Take 7.5 mLs (5 g) by mouth 2 times daily                                levothyroxine 88 MCG tablet   Commonly known as:  SYNTHROID/LEVOTHROID   Take 1 tablet (88 mcg) by mouth daily                                lidocaine 4 % Crea cream   Commonly known as:  LMX4   Apply topically once as needed for mild pain                                methylphenidate 10 MG tablet   Commonly known as:  RITALIN   10mg twice a day                                mirtazapine 45 MG tablet   Commonly  known as:  REMERON   Take 1 tablet (45 mg) by mouth At Bedtime                                multivitamin, therapeutic with minerals Tabs tablet   Take 1 tablet by mouth 2 times daily                                mupirocin 2 % ointment   Commonly known as:  BACTROBAN   Use in nose 2-3 times per week                                omeprazole 20 MG CR capsule   Commonly known as:  priLOSEC   Take 2 capsules (40 mg) by mouth 2 times daily                                ONETOUCH LANCETS Misc   by In Vitro route 4 times daily as needed                                phenytoin 30 MG CR capsule   Commonly known as:  DILANTIN   Take 4 capsules (120 mg) by mouth 2 times daily                                pravastatin 80 MG tablet   Commonly known as:  PRAVACHOL   Take 1 tablet (80 mg) by mouth daily                                rifaximin 550 MG Tabs tablet   Commonly known as:  XIFAXAN   Take 1 tablet (550 mg) by mouth 2 times daily                                simethicone 80 MG chewable tablet   Commonly known as:  MYLICON   Take 1 tablet (80 mg) by mouth every 6 hours as needed for cramping                                sulfamethoxazole-trimethoprim 800-160 MG per tablet   Commonly known as:  BACTRIM DS/SEPTRA DS   Take 1 tablet by mouth daily                                thiamine 100 MG tablet   Take 1 tablet (100 mg) by mouth daily                                traZODone 50 MG tablet   Commonly known as:  DESYREL   Take 1 tablet (50 mg) by mouth nightly as needed for sleep

## 2018-04-16 NOTE — OR NURSING
"Pt port right ant chest port accessed by ELANA Hauser, 20ga, 3/4\" power port access used, excellent blood return noted, no s/s infiltration  "

## 2018-04-16 NOTE — OR NURSING
PH II glucose of 80 after some juice and soda. He is taking more juice for the ride home. Caitie is a nurse practitioner and will assure he gets more sugar. VSS. Denies discomfort.

## 2018-04-16 NOTE — DISCHARGE INSTRUCTIONS
[facility]  Same-Day Surgery   Adult Discharge Orders & Instructions     For 24 hours after surgery    1. Get plenty of rest.  A responsible adult must stay with you for at least 24 hours after you leave the hospital.   2. Do not drive or use heavy equipment.  If you have weakness or tingling, don't drive or use heavy equipment until this feeling goes away.  3. Do not drink alcohol.  4. Avoid strenuous or risky activities.  Ask for help when climbing stairs.   5. You may feel lightheaded.  IF so, sit for a few minutes before standing.  Have someone help you get up.   6. If you have nausea (feel sick to your stomach): Drink only clear liquids such as apple juice, ginger ale, broth or 7-Up.  Rest may also help.  Be sure to drink enough fluids.  Move to a regular diet as you feel able.  7. You may have a slight fever. Call the doctor if your fever is over 100 F (37.7 C) (taken under the tongue) or lasts longer than 24 hours.  8. You may have a dry mouth, a sore throat, muscle aches or trouble sleeping.  These should go away after 24 hours.  9. Do not make important or legal decisions.   Call your doctor for any of the followin.  Signs of infection (fever, growing tenderness at the surgery site, a large amount of drainage or bleeding, severe pain, foul-smelling drainage, redness, swelling).    2. It has been over 8 to 10 hours since surgery and you are still not able to urinate (pass water).    3.  Headache for over 24 hours.      To contact a doctor, call Dr. Osorio's clinic at #[phone] or:        [phone] and ask for the resident on call for surgery/gastroenterology (answered 24 hours a day)      Emergency Department:    [facility]: [phone]       (TTY for hearing impaired: [phone])

## 2018-04-16 NOTE — OR NURSING
Pt Mother in law Caitie in preop to assist pt with preop info pt has poor short term memory d/t encehalopathy

## 2018-04-16 NOTE — ANESTHESIA POSTPROCEDURE EVALUATION
Patient: Mono Varghese    Procedure(s):  Upper Endoscopy  with esophageal varices banding; Latex Allergy  - Wound Class: II-Clean Contaminated    Diagnosis:Esophageal Varices   Diagnosis Additional Information: No value filed.    Anesthesia Type:  General, ETT    Note:  Anesthesia Post Evaluation    Patient location during evaluation: PACU  Patient participation: Able to fully participate in evaluation  Level of consciousness: sleepy but conscious and awake  Pain management: adequate  Airway patency: patent  Cardiovascular status: acceptable  Respiratory status: acceptable  Hydration status: acceptable  PONV: controlled     Anesthetic complications: None    Comments: Patient awake to voice, clear a/w.  Stable VS.  No new or current issues evident at this time.  OK out per PACU protocol.  --rcp          Last vitals:  Vitals:    04/16/18 0856 04/16/18 1106 04/16/18 1115   BP: 132/89 (!) 146/107 (!) 146/100   Resp: 16 16 16   Temp: 36.8  C (98.3  F) 37.1  C (98.8  F)    SpO2: 97%           Electronically Signed By: Timo Olivarez MD  April 16, 2018  11:23 AM

## 2018-04-16 NOTE — ANESTHESIA CARE TRANSFER NOTE
Patient: Mono Varghese    Procedure(s):  Upper Endoscopy  with esophageal varices banding; Latex Allergy  - Wound Class: II-Clean Contaminated    Diagnosis: Esophageal Varices   Diagnosis Additional Information: No value filed.    Anesthesia Type:   General, ETT     Note:  Airway :Face Mask  Patient transferred to:PACU  Comments: Oropharynx suctioned. spont resp. Extubated. Exchanging well. To PACU.  Report to RN at bedside. Pt awake. Handoff Report: Identifed the Patient, Identified the Reponsible Provider, Reviewed the pertinent medical history, Discussed the surgical course, Reviewed Intra-OP anesthesia mangement and issues during anesthesia, Set expectations for post-procedure period and Allowed opportunity for questions and acknowledgement of understanding      Vitals: (Last set prior to Anesthesia Care Transfer)    CRNA VITALS  4/16/2018 1033 - 4/16/2018 1115      4/16/2018             Pulse: 96    SpO2: 96 %                Electronically Signed By: TIESHA Guthrie CRNA  April 16, 2018  11:15 AM

## 2018-04-16 NOTE — IP AVS SNAPSHOT
Same Day Surgery [address] [address]    Phone:  [phone]                                       After Visit Summary   4/16/2018    Mono Varghese    MRN: [id number]           After Visit Summary Signature Page     I have received my discharge instructions, and my questions have been answered. I have discussed any challenges I see with this plan with the nurse or doctor.    ..........................................................................................................................................  Patient/Patient Representative Signature      ..........................................................................................................................................  Patient Representative Print Name and Relationship to Patient    ..................................................               ................................................  Date                                            Time    ..........................................................................................................................................  Reviewed by Signature/Title    ...................................................              ..............................................  Date                                                            Time

## 2018-04-17 ENCOUNTER — CARE COORDINATION (OUTPATIENT)
Dept: GASTROENTEROLOGY | Facility: CLINIC | Age: 48
End: 2018-04-17

## 2018-04-17 ENCOUNTER — TRANSFERRED RECORDS (OUTPATIENT)
Dept: HEALTH INFORMATION MANAGEMENT | Facility: CLINIC | Age: 48
End: 2018-04-17

## 2018-04-17 DIAGNOSIS — I85.00: Primary | ICD-10-CM

## 2018-04-17 NOTE — PROGRESS NOTES
Post Upper GI Endo (4/16/2018) with Dr. Kelly: Follow-up    Post procedure recommendations:  - Repeat upper endoscopy in 4-6 weeks in the OR (based on his comorbidities) for variceal surveillance. Will increase the surveillance periods after complete eradication of varices     Patient states: Voicemail left requesting a call back at patient's earliest convenience to see how he is doing.    Orders placed: Upper GI Endo and sent to OR scheduling.    Contact information verified for future questions/concerns. Patent's questions and concerns were addressed to their stated satisfaction. Patient is in agreement with this new plan of care.     Vanessa HORTON RN Coordinator  Dr. Kelly  Advanced Endoscopy  [phone]

## 2018-04-18 ENCOUNTER — TELEPHONE (OUTPATIENT)
Dept: GASTROENTEROLOGY | Facility: CLINIC | Age: 48
End: 2018-04-18

## 2018-04-18 NOTE — TELEPHONE ENCOUNTER
Faby, pt's mother-in-law, called to ask if pt has to repeat labs for hi  04/25 appt with Dr Alcazar.    Caitie asks that callback to be made to Yisel, pt's wife, [phone].

## 2018-04-18 NOTE — TELEPHONE ENCOUNTER
Left voicemail message informing wife, Yisel, that patient will need labs for his upcoming appointment wit Dr. Alcazar 4/25/18.  His most recent labs from 4/11/18 were elevated so labs will be needed.  Writer left number to call back if any questions.

## 2018-04-25 NOTE — MR AVS SNAPSHOT
After Visit Summary   4/25/2018    Mono Varghese    MRN: [id number]           Patient Information     Date Of Birth          1970        Visit Information        Provider Department      4/25/2018 10:30 AM Jennifer Alcazar MD [facility]        Today's Diagnoses     Loss of weight    -  1    Other ascites        Alcoholic cirrhosis of liver with ascites (H)          Care Instructions    MRI to evaluate for progressing clot  Low sodium diet  Diuretics   Check kidney function and electrolytes next week  Follow up with oncology           Follow-ups after your visit        Follow-up notes from your care team     Return in about 3 months (around 7/25/2018).      Your next 10 appointments already scheduled     Apr 26, 2018  1:35 PM CDT   (Arrive by 1:20 PM)   Return Visit with King Louis MD   [facility] ([facility])    [address] [address] [phone]            May 03, 2018  8:45 AM CDT   (Arrive by 8:30 AM)   MR ABDOMEN W/O & W CONTRAST with MGMR1   [facility] ([facility])    [address] [place] [address] [phone]           Take your medicines as usual, unless your doctor tells you not to. Bring a list of your current medicines to your exam (including vitamins, minerals and over-the-counter drugs). Also bring the results of similar scans you may have had.    You may or may not receive IV contrast for this exam pending the discretion of the Radiologist.   Do not eat or drink for 6 hours prior to exam.  The MRI machine uses a strong magnet. Please wear clothes without metal (snaps, zippers). A sweatsuit works well, or we may give you a hospital gown.  Please remove any body piercings and hair extensions before you arrive. You will also remove watches, jewelry, hairpins, wallets, dentures, partial dental plates and hearing aids. You  may wear contact lenses, and you may be able to wear your rings. We have a safe place to keep your personal items, but it is safer to leave them at home.  **IMPORTANT** THE INSTRUCTIONS BELOW ARE ONLY FOR THOSE PATIENTS WHO HAVE BEEN PRESCRIBED SEDATION OR GENERAL ANESTHESIA DURING THEIR MRI PROCEDURE:  IF YOUR DOCTOR PRESCRIBED ORAL SEDATION (take medicine to help you relax during your exam):   You must get the medicine from your doctor (oral medication) before you arrive. Bring the medicine to the exam. Do not take it at home. You ll be told when to take it upon arriving for your exam.   Arrive one hour early. Bring someone who can take you home after the test. Your medicine will make you sleepy. After the exam, you may not drive, take a bus or take a taxi by yourself.  IF YOUR DOCTOR PRESCRIBED IV SEDATION:   Arrive one hour early. Bring someone who can take you home after the test. Your medicine will make you sleepy. After the exam, you may not drive, take a bus or take a taxi by yourself.   No eating 6 hours before your exam. You may have clear liquids up until 4 hours before your exam. (Clear liquids include water, clear tea, black coffee and fruit juice without pulp.)  IF YOUR DOCTOR PRESCRIBED ANESTHESIA (be asleep for your exam):   Arrive 1 1/2 hours early. Bring someone who can take you home after the test. You may not drive, take a bus or take a taxi by yourself.   No eating 8 hours before your exam. You may have clear liquids up until 4 hours before your exam. (Clear liquids include water, clear tea, black coffee and fruit juice without pulp.)   You will spend four to five hours in the recovery room.  If you have any questions, please contact your Imaging Department exam site.            May 03, 2018  9:30 AM CDT   (Arrive by 9:15 AM)   MR BRAIN W/O & W CONTRAST with MGMR1   [facility] ([facility])    [address] [place] [address] [phone]            Take your medicines as usual, unless your doctor tells you not to. Bring a list of your current medicines to your exam (including vitamins, minerals and over-the-counter drugs).  You may or may not receive intravenous (IV) contrast for this exam pending the discretion of the Radiologist.  You do not need to do anything special to prepare.  The MRI machine uses a strong magnet. Please wear clothes without metal (snaps, zippers). A sweatsuit works well, or we may give you a hospital gown.  Please remove any body piercings and hair extensions before you arrive. You will also remove watches, jewelry, hairpins, wallets, dentures, partial dental plates and hearing aids. You may wear contact lenses, and you may be able to wear your rings. We have a safe place to keep your personal items, but it is safer to leave them at home.  **IMPORTANT** THE INSTRUCTIONS BELOW ARE ONLY FOR THOSE PATIENTS WHO HAVE BEEN PRESCRIBED SEDATION OR GENERAL ANESTHESIA DURING THEIR MRI PROCEDURE:  IF YOUR DOCTOR PRESCRIBED ORAL SEDATION (take medicine to help you relax during your exam):   You must get the medicine from your doctor (oral medication) before you arrive. Bring the medicine to the exam. Do not take it at home. You ll be told when to take it upon arriving for your exam.   Arrive one hour early. Bring someone who can take you home after the test. Your medicine will make you sleepy. After the exam, you may not drive, take a bus or take a taxi by yourself.  IF YOUR DOCTOR PRESCRIBED IV SEDATION:   Arrive one hour early. Bring someone who can take you home after the test. Your medicine will make you sleepy. After the exam, you may not drive, take a bus or take a taxi by yourself.   No eating 6 hours before your exam. You may have clear liquids up until 4 hours before your exam. (Clear liquids include water, clear tea, black coffee and fruit juice without pulp.)  IF YOUR DOCTOR PRESCRIBED ANESTHESIA (be asleep for your exam):   Arrive 1  1/2 hours early. Bring someone who can take you home after the test. You may not drive, take a bus or take a taxi by yourself.   No eating 8 hours before your exam. You may have clear liquids up until 4 hours before your exam. (Clear liquids include water, clear tea, black coffee and fruit juice without pulp.)   You will spend four to five hours in the recovery room.  Please call the Imaging Department at your exam site with any questions.            May 04, 2018 10:45 AM CDT   (Arrive by 10:30 AM)   Return Visit with Lauro Shaw MD   [facility] ([facility])    [address]  [address]  [facility] [address] [phone]            May 21, 2018  9:30 AM CDT   Return Visit with King Quiles DPM   [facility] ([facility])    [address] [place] [id number] [phone]            May 30, 2018   Procedure with Guru Jose Kelly MD   [facility] [place] Same Day Surgery (--)    [address] [address] [phone]            Aug 01, 2018  7:30 AM CDT   Lab with  LAB   [facility] ([facility])    [address]  1st Floor  [facility] [address] [phone]            Aug 01, 2018  8:30 AM CDT   (Arrive by 8:15 AM)   Return General Liver with Beatriz Tanner MD   [facility] Hepat[facility] ([facility])    [address] [place] [address]  [address]  [facility] [address] [phone]            Sep 04, 2018 12:30 PM CDT   (Arrive by 12:15 PM)   Return Seizure with Anil English MD   [facility] Neurology ([facility])    [address] [place] [address]  3rd Floor  [facility] [address] [phone]              Future tests that were ordered for you today     Open Future Orders        Priority Expected Expires Ordered    MRI Abdomen w & w/o contrast* Routine  5/25/2018 4/25/2018    AFP tumor marker  "Routine  5/25/2018 4/25/2018    Basic metabolic panel Routine  5/25/2018 4/25/2018            Who to contact     If you have questions or need follow up information about today's clinic visit or your schedule please contact [facility]PAT[facility] directly at [phone].  Normal or non-critical lab and imaging results will be communicated to you by MyChart, letter or phone within 4 business days after the clinic has received the results. If you do not hear from us within 7 days, please contact the clinic through [employer] or phone. If you have a critical or abnormal lab result, we will notify you by phone as soon as possible.  Submit refill requests through [employer] or call your pharmacy and they will forward the refill request to us. Please allow 3 business days for your refill to be completed.          Additional Information About Your Visit        [employer] Information     [employer] gives you secure access to your electronic health record. If you see a primary care provider, you can also send messages to your care team and make appointments. If you have questions, please call your primary care clinic.  If you do not have a primary care provider, please call [phone] and they will assist you.        Care EveryWhere ID     This is your Care EveryWhere ID. This could be used by other organizations to access your [facility] medical records  [id number]        Your Vitals Were     Pulse Temperature Respirations Height Pulse Oximetry BMI (Body Mass Index)    65 98  F (36.7  C) (Oral) 16 1.791 m (5' 10.5\") 98% 27.07 kg/m2       Blood Pressure from Last 3 Encounters:   04/25/18 125/79   04/16/18 (!) 132/95   04/11/18 125/79    Weight from Last 3 Encounters:   04/25/18 86.8 kg (191 lb 6.4 oz)   04/16/18 91 kg (200 lb 9.9 oz)   04/11/18 90.1 kg (198 lb 11.2 oz)                 Today's Medication Changes          These changes are accurate as of 4/25/18 11:24 AM.  If you have any questions, ask your nurse or doctor.             "   Start taking these medicines.        Dose/Directions    furosemide 20 MG tablet   Commonly known as:  LASIX   Used for:  Other ascites   Started by:  Jennifer Alcazar MD        Dose:  20 mg   Take 1 tablet (20 mg) by mouth daily   Quantity:  30 tablet   Refills:  3       spironolactone 25 MG tablet   Commonly known as:  ALDACTONE   Used for:  Other ascites   Started by:  Jennifer Alcazar MD        Dose:  50 mg   Take 2 tablets (50 mg) by mouth daily   Quantity:  30 tablet   Refills:  3         These medicines have changed or have updated prescriptions.        Dose/Directions    cyanocobalamin 1000 MCG tablet   Commonly known as:  vitamin  B-12   This may have changed:  when to take this   Used for:  Alcoholic cirrhosis of liver with ascites (H)        Dose:  1000 mcg   1 tablet (1,000 mcg) by Per G Tube route daily   Quantity:  130 tablet   Refills:  2            Where to get your medicines      These medications were sent to [facility] [address] [place] [address] [facility]  [address] [facility] [address]     Phone:  [phone]     furosemide 20 MG tablet    spironolactone 25 MG tablet                Primary Care Provider Office Phone # Fax #    King Louis [phone]       [address] [address]        Equal Access to Services     SAMUEL FAIR AH: Jennifer lirao Sojoali, waaxda luqadaha, qaybta kaalmada adeegyada, josue tamayo. So [facility] [phone].    ATENCIÓN: Si habla español, tiene a chiang disposición servicios gratuitos de asistencia lingüística. [facility] [phone].    We comply with applicable federal civil rights laws and [place] laws. We do not discriminate on the basis of race, color, national origin, age, disability, sex, sexual orientation, or gender identity.            Thank you!     Thank you for choosing [facility]  for your care. Our goal is always to provide you with  excellent care. Hearing back from our patients is one way we can continue to improve our services. Please take a few minutes to complete the written survey that you may receive in the mail after your visit with us. Thank you!             Your Updated Medication List - Protect others around you: Learn how to safely use, store and throw away your medicines at www.disposemymeds.org.          This list is accurate as of 4/25/18 11:24 AM.  Always use your most recent med list.                   Brand Name Dispense Instructions for use Diagnosis    blood glucose monitoring test strip    ONETOUCH ULTRA    400 each    Use to test blood sugars 4 times daily as needed or as directed.    Diabetes mellitus, type 2 (H)       Calcium Carb-Cholecalciferol 500-400 MG-UNIT Tabs    calcium 500 +D    90 tablet    Take 500 mg by mouth daily    Malnutrition (H)       CANE, ANY MATERIAL     1 each    One cane    Balance disorder       ciclopirox 0.77 % cream    LOPROX    90 g    Apply topically 2 times daily To feet and toenails.    Tinea pedis of both feet       cyanocobalamin 1000 MCG tablet    vitamin  B-12    130 tablet    1 tablet (1,000 mcg) by Per G Tube route daily    Alcoholic cirrhosis of liver with ascites (H)       dulaglutide 1.5 MG/0.5ML pen    TRULICITY    6 mL    Inject 1.5 mg Subcutaneous every 7 days    DM type 2, goal HbA1c 7%-8% (H)       ferrous sulfate 325 (65 Fe) MG tablet    IRON    200 tablet    Take 1 tablet (325 mg) by mouth 2 times daily    Other iron deficiency anemia       furosemide 20 MG tablet    LASIX    30 tablet    Take 1 tablet (20 mg) by mouth daily    Other ascites       gabapentin 100 MG capsule    NEURONTIN    30 capsule    Take 1 capsule (100 mg) by mouth At Bedtime    Mild episode of recurrent major depressive disorder (H)       glipiZIDE 10 MG 24 hr tablet    GLUCOTROL XL    90 tablet    Take 1 tablet (10 mg) by mouth daily    DM type 2, goal HbA1c 7%-8% (H)       HYDROcodone-acetaminophen  5-325 MG per tablet    NORCO    60 tablet    Take 2 tablets by mouth every 6 hours as needed for moderate to severe pain    Brain tumor (H)       hydrOXYzine 25 MG tablet    ATARAX    180 tablet    Take 1 tablet (25 mg) by mouth 2 times daily    Itching, Anxiety       lactulose 10 GM/15ML solution    CHRONULAC    473 mL    Take 7.5 mLs (5 g) by mouth 2 times daily    Hepatic encephalopathy (H)       levothyroxine 88 MCG tablet    SYNTHROID/LEVOTHROID    90 tablet    Take 1 tablet (88 mcg) by mouth daily    Hypothyroidism       lidocaine 4 % Crea cream    LMX4    133 g    Apply topically once as needed for mild pain    Port catheter in place       methylphenidate 10 MG tablet    RITALIN    60 tablet    10mg twice a day    Major depressive disorder with single episode, in partial remission (H)       mirtazapine 45 MG tablet    REMERON    30 tablet    Take 1 tablet (45 mg) by mouth At Bedtime    Major depressive disorder with single episode, in partial remission (H)       multivitamin, therapeutic with minerals Tabs tablet      Take 1 tablet by mouth 2 times daily        mupirocin 2 % ointment    BACTROBAN    22 g    Use in nose 2-3 times per week    Epistaxis       omeprazole 20 MG CR capsule    priLOSEC    360 capsule    Take 2 capsules (40 mg) by mouth 2 times daily    Gastroesophageal reflux disease without esophagitis       ONETOUCH LANCETS Misc     100 each    by In Vitro route 4 times daily as needed    Type II or unspecified type diabetes mellitus without mention of complication, not stated as uncontrolled       phenytoin 30 MG CR capsule    DILANTIN    720 capsule    Take 4 capsules (120 mg) by mouth 2 times daily    Oligoastrocytoma of parietal lobe (H)       pravastatin 80 MG tablet    PRAVACHOL    90 tablet    Take 1 tablet (80 mg) by mouth daily    High cholesterol       rifaximin 550 MG Tabs tablet    XIFAXAN    60 tablet    Take 1 tablet (550 mg) by mouth 2 times daily    Hepatic encephalopathy (H)        simethicone 80 MG chewable tablet    MYLICON    100 tablet    Take 1 tablet (80 mg) by mouth every 6 hours as needed for cramping    Abdominal cramping       spironolactone 25 MG tablet    ALDACTONE    30 tablet    Take 2 tablets (50 mg) by mouth daily    Other ascites       sulfamethoxazole-trimethoprim 800-160 MG per tablet    BACTRIM DS/SEPTRA DS    30 tablet    Take 1 tablet by mouth daily    Spontaneous bacterial peritonitis (H)       thiamine 100 MG tablet     100 tablet    Take 1 tablet (100 mg) by mouth daily    Alcoholic cirrhosis of liver with ascites (H)       traZODone 50 MG tablet    DESYREL    30 tablet    Take 1 tablet (50 mg) by mouth nightly as needed for sleep    Primary insomnia

## 2018-04-25 NOTE — NURSING NOTE
"Chief Complaint   Patient presents with     RECHECK     Cirrhosis       Initial /79 (BP Location: Right arm, Patient Position: Sitting, Cuff Size: Adult Regular)  Pulse 65  Temp 98  F (36.7  C) (Oral)  Resp 16  Ht 1.791 m (5' 10.5\")  Wt 86.8 kg (191 lb 6.4 oz)  SpO2 98%  BMI 27.07 kg/m2 Estimated body mass index is 27.07 kg/(m^2) as calculated from the following:    Height as of this encounter: 1.791 m (5' 10.5\").    Weight as of this encounter: 86.8 kg (191 lb 6.4 oz).  Medication Reconciliation: complete     Kristen Don [facility]  4/25/2018 10:11 AM        "

## 2018-04-25 NOTE — PROGRESS NOTES
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     Patient presents to clinic with his mother in law.  Previously requiring monthly paracentesis; weekly large volume paracentesis over the last one week 6 - 8 liters removed each time.  Also with lower extremity edema.  Drinks 8-10 cans of diet soda.  No diuretic therapy.  Not able to adhere to low sodium diet as he only eats high sodium food when he does eat, at home alone during the day. Not eating well and is having weight loss.  30 + weight loss since December.  Feels full.  On Trulicity.  Confusion on lactulose with 1-2 bowel movements per day; and rifaximin.  Does have residual cognitive deficits after treatment of brain cancer.  Does not wish to further increase dose of lactulose.  No abdominal pain, nausea/vomiting, constipation, fevers/chills, jaundice.  Bright red blood in the stool for which he saw Dr. Stevens of colorectal surgery.  Flexible sigmoidoscopy in 1/2018 notable for prominent rectal veins and internal hemorrhoids, last complete colonoscopy was 2015. Given stability in hemoglobin, no further intervention was recommended.  No melenic stool or vomiting blood.  On bactrim - no known history of SBP.     Medications  Current Outpatient Prescriptions   Medication     blood glucose monitoring (ONETOUCH ULTRA) test strip     Calcium Carb-Cholecalciferol (CALCIUM 500 +D) 500-400 MG-UNIT TABS     ciclopirox (LOPROX) 0.77 % cream     cyanocobalamin (VITAMIN  B-12) 1000 MCG tablet     dulaglutide (TRULICITY) 1.5 MG/0.5ML pen     ferrous sulfate (IRON) 325 (65 FE) MG tablet      "gabapentin (NEURONTIN) 100 MG capsule     glipiZIDE (GLUCOTROL XL) 10 MG 24 hr tablet     HYDROcodone-acetaminophen (NORCO) 5-325 MG per tablet     hydrOXYzine (ATARAX) 25 MG tablet     lactulose (CHRONULAC) 10 GM/15ML solution     levothyroxine (SYNTHROID/LEVOTHROID) 88 MCG tablet     lidocaine (LMX4) 4 % CREA cream     methylphenidate (RITALIN) 10 MG tablet     mirtazapine (REMERON) 45 MG tablet     multivitamin, therapeutic with minerals (THERA-VIT-M) TABS     mupirocin (BACTROBAN) 2 % ointment     omeprazole (PRILOSEC) 20 MG CR capsule     ONE TOUCH LANCETS MISC     phenytoin (DILANTIN) 30 MG CR capsule     pravastatin (PRAVACHOL) 80 MG tablet     rifaximin (XIFAXAN) 550 MG TABS tablet     simethicone (MYLICON) 80 MG chewable tablet     sulfamethoxazole-trimethoprim (BACTRIM DS/SEPTRA DS) 800-160 MG per tablet     thiamine (VITAMIN B-1) 100 MG tablet     traZODone (DESYREL) 50 MG tablet     CANE, ANY MATERIAL     No current facility-administered medications for this visit.        Review of systems  A 10-point review of systems was negative.    Examination  /79 (BP Location: Right arm, Patient Position: Sitting, Cuff Size: Adult Regular)  Pulse 65  Temp 98  F (36.7  C) (Oral)  Resp 16  Ht 5' 10.5\" (1.791 m)  Wt 191 lb 6.4 oz (86.8 kg)  SpO2 98%  BMI 27.07 kg/m2  Gen- well, NAD, A+Ox3, normal color  CVS- RRR  RS- CTA  Abd- soft, distended, non-tender  Extr- warm and well perfused, edema b/l  Neuro- asterixis  Skin- no rash, jaundice  Psych- normal mood  Lym- no LAD    Laboratory    MELD-Na score: 12 at 4/25/2018  9:24 AM  MELD score: 12 at 4/25/2018  9:24 AM  Calculated from:  Serum Creatinine: 0.96 mg/dL (Rounded to 1) at 4/25/2018  9:24 AM  Serum Sodium: 139 mmol/L (Rounded to 137) at 4/25/2018  9:24 AM  Total Bilirubin: 1.1 mg/dL at 4/25/2018  9:24 AM  INR(ratio): 1.58 at 4/25/2018  9:24 AM  Age: 47 years    Labs from today reviewed.  WBC 1.9; Hemoglobin 13, Platelets 47, INR 1.6, Creatinine 0.96, " BMP normal, T Bili 1.1,     Radiology  EGD 4/2018:  Impression:          - Grade II esophageal varices. 6 Bands placed. Post                        banding scarring was seen                        - Portal hypertensive gastropathy.                        - No gastric varices seen.                        - Normal examined duodenum.   Recommendation:      - Discharge patient to home (ambulatory).                        - Repeat upper endoscopy in 4-6 weeks in the OR (based                        on his comorbidities) for variceal surveillance. Will                        increase the surveillance periods after complete                        eradication of varices     10/2017:  1. Cirrhotic appearance of liver without hepatic mass identified.  2. Progression of nonocclusive thrombus, previously only within the  left portal vein, now involving the main portal vein as well.  3. Sequela of portal hypertension characterized by recanalization of  the paraumbilical vein, dilated portal vein, splenomegaly and moderate  ascites.  4. Gallbladder wall thickening, nonspecific in the setting of liver  disease. There is some layering sludge and mural nodularity some of  which may represent tiny gallbladder polyps.     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  MELD Na = 12.       Recommendations  1.  Ascites newly requiring weekly large volume paracentesis.  Likely multifactorial related to progressive portal vein thrombosis and high sodium diet.  Not currently on diuretic therapy.    -MRI with contrast/MRV  -2 gram sodium restriction diet; family reports difficulty with adherence as he has some cognitive decline related to brain cancer and refuses  certain foods; declines nutrition consultation  -will start diuretic therapy today with lasix 20 mg and spirinolactone 50 mg - increase as tolerated  -recheck BMP with PCP next week    2. Weight loss.  Planned for MRI of brain and oncology follow up.  Will check liver MRI today and evaluate for new liver lesions.    -AFP  -MRI/MRV    -Follow up with oncology     3. Varices  -Agree with follow up EGD with Dr. HAWKINS    Case discussed with Dr. Tanner  RTC in 3 months; sooner as needed    Jennifer Alcazar MD  Hepatology  [phone]    Attestation:  This patient has been seen and evaluated by me, Beatriz Tanner.  Discussed with the house staff team or resident(s) and agree with the findings and plan in this note.

## 2018-04-25 NOTE — PATIENT INSTRUCTIONS
MRI to evaluate for progressing clot  Low sodium diet  Diuretics   Check kidney function and electrolytes next week  Follow up with oncology

## 2018-04-25 NOTE — LETTER
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     Patient presents to clinic with his mother in law.  Previously requiring monthly paracentesis; weekly large volume paracentesis over the last one week 6 - 8 liters removed each time.  Also with lower extremity edema.  Drinks 8-10 cans of diet soda.  No diuretic therapy.  Not able to adhere to low sodium diet as he only eats high sodium food when he does eat, at home alone during the day. Not eating well and is having weight loss.  30 + weight loss since December.  Feels full.  On Trulicity.  Confusion on lactulose with 1-2 bowel movements per day; and rifaximin.  Does have residual cognitive deficits after treatment of brain cancer.  Does not wish to further increase dose of lactulose.  No abdominal pain, nausea/vomiting, constipation, fevers/chills, jaundice.  Bright red blood in the stool for which he saw Dr. Stevens of colorectal surgery.  Flexible sigmoidoscopy in 1/2018 notable for prominent rectal veins and internal hemorrhoids, last complete colonoscopy was 2015. Given stability in hemoglobin, no further intervention was recommended.  No melenic stool or vomiting blood.  On bactrim - no known history of SBP.     Medications  Current Outpatient Prescriptions   Medication     blood glucose monitoring (ONETOUCH ULTRA) test strip     Calcium Carb-Cholecalciferol (CALCIUM 500 +D) 500-400 MG-UNIT TABS     ciclopirox (LOPROX) 0.77 % cream     cyanocobalamin (VITAMIN  B-12) 1000 MCG tablet      "dulaglutide (TRULICITY) 1.5 MG/0.5ML pen     ferrous sulfate (IRON) 325 (65 FE) MG tablet     gabapentin (NEURONTIN) 100 MG capsule     glipiZIDE (GLUCOTROL XL) 10 MG 24 hr tablet     HYDROcodone-acetaminophen (NORCO) 5-325 MG per tablet     hydrOXYzine (ATARAX) 25 MG tablet     lactulose (CHRONULAC) 10 GM/15ML solution     levothyroxine (SYNTHROID/LEVOTHROID) 88 MCG tablet     lidocaine (LMX4) 4 % CREA cream     methylphenidate (RITALIN) 10 MG tablet     mirtazapine (REMERON) 45 MG tablet     multivitamin, therapeutic with minerals (THERA-VIT-M) TABS     mupirocin (BACTROBAN) 2 % ointment     omeprazole (PRILOSEC) 20 MG CR capsule     ONE TOUCH LANCETS MISC     phenytoin (DILANTIN) 30 MG CR capsule     pravastatin (PRAVACHOL) 80 MG tablet     rifaximin (XIFAXAN) 550 MG TABS tablet     simethicone (MYLICON) 80 MG chewable tablet     sulfamethoxazole-trimethoprim (BACTRIM DS/SEPTRA DS) 800-160 MG per tablet     thiamine (VITAMIN B-1) 100 MG tablet     traZODone (DESYREL) 50 MG tablet     CANE, ANY MATERIAL     No current facility-administered medications for this visit.        Review of systems  A 10-point review of systems was negative.    Examination  /79 (BP Location: Right arm, Patient Position: Sitting, Cuff Size: Adult Regular)  Pulse 65  Temp 98  F (36.7  C) (Oral)  Resp 16  Ht 5' 10.5\" (1.791 m)  Wt 191 lb 6.4 oz (86.8 kg)  SpO2 98%  BMI 27.07 kg/m2  Gen- well, NAD, A+Ox3, normal color  CVS- RRR  RS- CTA  Abd- soft, distended, non-tender  Extr- warm and well perfused, edema b/l  Neuro- asterixis  Skin- no rash, jaundice  Psych- normal mood  Lym- no LAD    Laboratory    MELD-Na score: 12 at 4/25/2018  9:24 AM  MELD score: 12 at 4/25/2018  9:24 AM  Calculated from:  Serum Creatinine: 0.96 mg/dL (Rounded to 1) at 4/25/2018  9:24 AM  Serum Sodium: 139 mmol/L (Rounded to 137) at 4/25/2018  9:24 AM  Total Bilirubin: 1.1 mg/dL at 4/25/2018  9:24 AM  INR(ratio): 1.58 at 4/25/2018  9:24 AM  Age: 47 " years    Labs from today reviewed.  WBC 1.9; Hemoglobin 13, Platelets 47, INR 1.6, Creatinine 0.96, BMP normal, T Bili 1.1,     Radiology  EGD 4/2018:  Impression:          - Grade II esophageal varices. 6 Bands placed. Post                        banding scarring was seen                        - Portal hypertensive gastropathy.                        - No gastric varices seen.                        - Normal examined duodenum.   Recommendation:      - Discharge patient to home (ambulatory).                        - Repeat upper endoscopy in 4-6 weeks in the OR (based                        on his comorbidities) for variceal surveillance. Will                        increase the surveillance periods after complete                        eradication of varices     10/2017:  1. Cirrhotic appearance of liver without hepatic mass identified.  2. Progression of nonocclusive thrombus, previously only within the  left portal vein, now involving the main portal vein as well.  3. Sequela of portal hypertension characterized by recanalization of  the paraumbilical vein, dilated portal vein, splenomegaly and moderate  ascites.  4. Gallbladder wall thickening, nonspecific in the setting of liver  disease. There is some layering sludge and mural nodularity some of  which may represent tiny gallbladder polyps.     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  MELD Na = 12.       Recommendations  1.  Ascites newly requiring weekly large volume paracentesis.  Likely multifactorial related to progressive portal vein thrombosis and high sodium diet.  Not currently on diuretic therapy.    -MRI with contrast/MRV  -2 gram sodium restriction diet; family  reports difficulty with adherence as he has some cognitive decline related to brain cancer and refuses certain foods; declines nutrition consultation  -will start diuretic therapy today with lasix 20 mg and spirinolactone 50 mg - increase as tolerated  -recheck BMP with PCP next week    2. Weight loss.  Planned for MRI of brain and oncology follow up.  Will check liver MRI today and evaluate for new liver lesions.    -AFP  -MRI/MRV    -Follow up with oncology     3. Varices  -Agree with follow up EGD with Dr. HAWKINS    Case discussed with Dr. Tanner  RTC in 3 months; sooner as needed    Jennifer Alcazar MD  Hepatology  [phone]    Attestation:  This patient has been seen and evaluated by me, Beatriz Tanner.  Discussed with the house staff team or resident(s) and agree with the findings and plan in this note.       Jennifer Alcazar MD

## 2018-04-25 NOTE — LETTER
4/25/2018       RE: Mono Varghese  [address] [facility] [address]     Dear Colleague,    Thank you for referring your patient, Mono Varghese, to the [facility] HEPATOLOGY at [facility]. Please see a copy of my visit note below.    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     Patient presents to clinic with his mother in law.  Previously requiring monthly paracentesis; weekly large volume paracentesis over the last one week 6 - 8 liters removed each time.  Also with lower extremity edema.  Drinks 8-10 cans of diet soda.  No diuretic therapy.  Not able to adhere to low sodium diet as he only eats high sodium food when he does eat, at home alone during the day. Not eating well and is having weight loss.  30 + weight loss since December.  Feels full.  On Trulicity.  Confusion on lactulose with 1-2 bowel movements per day; and rifaximin.  Does have residual cognitive deficits after treatment of brain cancer.  Does not wish to further increase dose of lactulose.  No abdominal pain, nausea/vomiting, constipation, fevers/chills, jaundice.  Bright red blood in the stool for which he saw Dr. Stevens of colorectal surgery.  Flexible sigmoidoscopy in 1/2018 notable for prominent rectal veins and internal hemorrhoids, last complete colonoscopy was 2015. Given stability in hemoglobin, no further intervention was recommended.  No melenic stool or vomiting blood.  On bactrim - no known history of SBP.     Medications  Current Outpatient Prescriptions   Medication     blood glucose monitoring  "(ONETOUCH ULTRA) test strip     Calcium Carb-Cholecalciferol (CALCIUM 500 +D) 500-400 MG-UNIT TABS     ciclopirox (LOPROX) 0.77 % cream     cyanocobalamin (VITAMIN  B-12) 1000 MCG tablet     dulaglutide (TRULICITY) 1.5 MG/0.5ML pen     ferrous sulfate (IRON) 325 (65 FE) MG tablet     gabapentin (NEURONTIN) 100 MG capsule     glipiZIDE (GLUCOTROL XL) 10 MG 24 hr tablet     HYDROcodone-acetaminophen (NORCO) 5-325 MG per tablet     hydrOXYzine (ATARAX) 25 MG tablet     lactulose (CHRONULAC) 10 GM/15ML solution     levothyroxine (SYNTHROID/LEVOTHROID) 88 MCG tablet     lidocaine (LMX4) 4 % CREA cream     methylphenidate (RITALIN) 10 MG tablet     mirtazapine (REMERON) 45 MG tablet     multivitamin, therapeutic with minerals (THERA-VIT-M) TABS     mupirocin (BACTROBAN) 2 % ointment     omeprazole (PRILOSEC) 20 MG CR capsule     ONE TOUCH LANCETS MISC     phenytoin (DILANTIN) 30 MG CR capsule     pravastatin (PRAVACHOL) 80 MG tablet     rifaximin (XIFAXAN) 550 MG TABS tablet     simethicone (MYLICON) 80 MG chewable tablet     sulfamethoxazole-trimethoprim (BACTRIM DS/SEPTRA DS) 800-160 MG per tablet     thiamine (VITAMIN B-1) 100 MG tablet     traZODone (DESYREL) 50 MG tablet     CANE, ANY MATERIAL     No current facility-administered medications for this visit.        Review of systems  A 10-point review of systems was negative.    Examination  /79 (BP Location: Right arm, Patient Position: Sitting, Cuff Size: Adult Regular)  Pulse 65  Temp 98  F (36.7  C) (Oral)  Resp 16  Ht 5' 10.5\" (1.791 m)  Wt 191 lb 6.4 oz (86.8 kg)  SpO2 98%  BMI 27.07 kg/m2  Gen- well, NAD, A+Ox3, normal color  CVS- RRR  RS- CTA  Abd- soft, distended, non-tender  Extr- warm and well perfused, edema b/l  Neuro- asterixis  Skin- no rash, jaundice  Psych- normal mood  Lym- no LAD    Laboratory    MELD-Na score: 12 at 4/25/2018  9:24 AM  MELD score: 12 at 4/25/2018  9:24 AM  Calculated from:  Serum Creatinine: 0.96 mg/dL (Rounded to 1) " at 4/25/2018  9:24 AM  Serum Sodium: 139 mmol/L (Rounded to 137) at 4/25/2018  9:24 AM  Total Bilirubin: 1.1 mg/dL at 4/25/2018  9:24 AM  INR(ratio): 1.58 at 4/25/2018  9:24 AM  Age: 47 years    Labs from today reviewed.  WBC 1.9; Hemoglobin 13, Platelets 47, INR 1.6, Creatinine 0.96, BMP normal, T Bili 1.1,     Radiology  EGD 4/2018:  Impression:          - Grade II esophageal varices. 6 Bands placed. Post                        banding scarring was seen                        - Portal hypertensive gastropathy.                        - No gastric varices seen.                        - Normal examined duodenum.   Recommendation:      - Discharge patient to home (ambulatory).                        - Repeat upper endoscopy in 4-6 weeks in the OR (based                        on his comorbidities) for variceal surveillance. Will                        increase the surveillance periods after complete                        eradication of varices     10/2017:  1. Cirrhotic appearance of liver without hepatic mass identified.  2. Progression of nonocclusive thrombus, previously only within the  left portal vein, now involving the main portal vein as well.  3. Sequela of portal hypertension characterized by recanalization of  the paraumbilical vein, dilated portal vein, splenomegaly and moderate  ascites.  4. Gallbladder wall thickening, nonspecific in the setting of liver  disease. There is some layering sludge and mural nodularity some of  which may represent tiny gallbladder polyps.     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  MELD Na = 12.       Recommendations  1.  Ascites newly requiring weekly large volume paracentesis.  Likely  multifactorial related to progressive portal vein thrombosis and high sodium diet.  Not currently on diuretic therapy.    -MRI with contrast/MRV  -2 gram sodium restriction diet; family reports difficulty with adherence as he has some cognitive decline related to brain cancer and refuses certain foods; declines nutrition consultation  -will start diuretic therapy today with lasix 20 mg and spirinolactone 50 mg - increase as tolerated  -recheck BMP with PCP next week    2. Weight loss.  Planned for MRI of brain and oncology follow up.  Will check liver MRI today and evaluate for new liver lesions.    -AFP  -MRI/MRV    -Follow up with oncology     3. Varices  -Agree with follow up EGD with Dr. HAWKINS    Case discussed with Dr. Tanner  RTC in 3 months; sooner as needed    Jennifer Alaczar MD  Hepatology  [phone]    Attestation:  This patient has been seen and evaluated by me, Beatriz Tanner.  Discussed with the house staff team or resident(s) and agree with the findings and plan in this note.       Again, thank you for allowing me to participate in the care of your patient.      Sincerely,    Jennifer Alcazar MD

## 2018-04-26 NOTE — MR AVS SNAPSHOT
After Visit Summary   4/26/2018    Mono Varghese    MRN: [id number]           Patient Information     Date Of Birth          1970        Visit Information        Provider Department      4/26/2018 1:35 PM King Louis MD [facility]        Today's Diagnoses     Loss of weight    -  1    Muscle cramp        Memory loss        DM type 2, goal HbA1c 7%-8% (H)          Care Instructions    Primary Care Center: [phone]     Primary Care Center Medication Refill Request Information:  * Please contact your pharmacy regarding ANY request for medication refills.  ** [facility] Prescription Fax = [phone]  * Please allow 3 business days for routine medication refills.  * Please allow 5 business days for controlled substance medication refills.     Primary Care Center Test Result notification information:  *You will be notified with in 7-10 days of your appointment day regarding the results of your test.  If you are on MyChart you will be notified as soon as the provider has reviewed the results and signed off on them.+          Follow-ups after your visit        Additional Services     PHARMACY (MT[facility]) REFERRAL       Your provider has referred you to: **[facility] Scheduling (numerous locations) [phone]   [email or web address]  [facility]: Primary Care Center [facility] [phone]   [email or web address]    Reason for Referral: DM 2 might be too high dose    The [facility] department will contact you to schedule an appointment.  You may also schedule the appointment by calling [phone].  For [place] patients, please call [phone] to confirm/schedule your appointment on the next business day.    This service is designed to help you get the most from your medications.  A specially trained Pharmacist will work closely  with you and your providers to solve any questions, concerns, issues or problems related to your medications.    Please bring all of your prescription and non-prescription medications (such as vitamins, over-the-counter medications, and herbals) or a detailed medication list to your appointment.    If you have a glucose meter or other home monitoring information, please also bring this to your appointment (i.e. blood glucose log, blood pressure log, pain log, etc.).                  Follow-up notes from your care team     Return in about 1 month (around 5/26/2018).      Your next 10 appointments already scheduled     May 03, 2018  8:45 AM CDT   (Arrive by 8:30 AM)   MR ABDOMEN W/O & W CONTRAST with MGMR1   [facility]    [address] [place] [address] [phone]           Take your medicines as usual, unless your doctor tells you not to. Bring a list of your current medicines to your exam (including vitamins, minerals and over-the-counter drugs). Also bring the results of similar scans you may have had.    You may or may not receive IV contrast for this exam pending the discretion of the Radiologist.   Do not eat or drink for 6 hours prior to exam.  The MRI machine uses a strong magnet. Please wear clothes without metal (snaps, zippers). A sweatsuit works well, or we may give you a hospital gown.  Please remove any body piercings and hair extensions before you arrive. You will also remove watches, jewelry, hairpins, wallets, dentures, partial dental plates and hearing aids. You may wear contact lenses, and you may be able to wear your rings. We have a safe place to keep your personal items, but it is safer to leave them at home.  **IMPORTANT** THE INSTRUCTIONS BELOW ARE ONLY FOR THOSE PATIENTS WHO HAVE BEEN PRESCRIBED SEDATION OR GENERAL ANESTHESIA DURING THEIR MRI PROCEDURE:  IF YOUR DOCTOR PRESCRIBED ORAL SEDATION (take medicine to help you relax  during your exam):   You must get the medicine from your doctor (oral medication) before you arrive. Bring the medicine to the exam. Do not take it at home. You ll be told when to take it upon arriving for your exam.   Arrive one hour early. Bring someone who can take you home after the test. Your medicine will make you sleepy. After the exam, you may not drive, take a bus or take a taxi by yourself.  IF YOUR DOCTOR PRESCRIBED IV SEDATION:   Arrive one hour early. Bring someone who can take you home after the test. Your medicine will make you sleepy. After the exam, you may not drive, take a bus or take a taxi by yourself.   No eating 6 hours before your exam. You may have clear liquids up until 4 hours before your exam. (Clear liquids include water, clear tea, black coffee and fruit juice without pulp.)  IF YOUR DOCTOR PRESCRIBED ANESTHESIA (be asleep for your exam):   Arrive 1 1/2 hours early. Bring someone who can take you home after the test. You may not drive, take a bus or take a taxi by yourself.   No eating 8 hours before your exam. You may have clear liquids up until 4 hours before your exam. (Clear liquids include water, clear tea, black coffee and fruit juice without pulp.)   You will spend four to five hours in the recovery room.  If you have any questions, please contact your Imaging Department exam site.            May 03, 2018  9:30 AM CDT   (Arrive by 9:15 AM)   MR BRAIN W/O & W CONTRAST with MGMR1   [facility]    [address] [place] [address] [phone]           Take your medicines as usual, unless your doctor tells you not to. Bring a list of your current medicines to your exam (including vitamins, minerals and over-the-counter drugs).  You may or may not receive intravenous (IV) contrast for this exam pending the discretion of the Radiologist.  You do not need to do anything special to prepare.  The MRI machine uses a strong  magnet. Please wear clothes without metal (snaps, zippers). A sweatsuit works well, or we may give you a hospital gown.  Please remove any body piercings and hair extensions before you arrive. You will also remove watches, jewelry, hairpins, wallets, dentures, partial dental plates and hearing aids. You may wear contact lenses, and you may be able to wear your rings. We have a safe place to keep your personal items, but it is safer to leave them at home.  **IMPORTANT** THE INSTRUCTIONS BELOW ARE ONLY FOR THOSE PATIENTS WHO HAVE BEEN PRESCRIBED SEDATION OR GENERAL ANESTHESIA DURING THEIR MRI PROCEDURE:  IF YOUR DOCTOR PRESCRIBED ORAL SEDATION (take medicine to help you relax during your exam):   You must get the medicine from your doctor (oral medication) before you arrive. Bring the medicine to the exam. Do not take it at home. You ll be told when to take it upon arriving for your exam.   Arrive one hour early. Bring someone who can take you home after the test. Your medicine will make you sleepy. After the exam, you may not drive, take a bus or take a taxi by yourself.  IF YOUR DOCTOR PRESCRIBED IV SEDATION:   Arrive one hour early. Bring someone who can take you home after the test. Your medicine will make you sleepy. After the exam, you may not drive, take a bus or take a taxi by yourself.   No eating 6 hours before your exam. You may have clear liquids up until 4 hours before your exam. (Clear liquids include water, clear tea, black coffee and fruit juice without pulp.)  IF YOUR DOCTOR PRESCRIBED ANESTHESIA (be asleep for your exam):   Arrive 1 1/2 hours early. Bring someone who can take you home after the test. You may not drive, take a bus or take a taxi by yourself.   No eating 8 hours before your exam. You may have clear liquids up until 4 hours before your exam. (Clear liquids include water, clear tea, black coffee and fruit juice without pulp.)   You will spend four to five hours in the recovery room.   Please call the Imaging Department at your exam site with any questions.            May 04, 2018  9:00 AM CDT   (Arrive by 8:45 AM)   Office Visit with Viji Howard RPH   [facility] ([facility])    [address]  4th Floor  [facility] [id number] [phone]           Bring a current list of meds and any records pertaining to this visit. For Physicals, please bring immunization records and any forms needing to be filled out. Please arrive 10 minutes early to complete paperwork.            May 04, 2018 10:45 AM CDT   (Arrive by 10:30 AM)   Return Visit with Lauro Shaw MD   [facility] ([facility])    [address] [place] [address]  [address]  [facility] [id number] [phone]            May 21, 2018  9:30 AM CDT   Return Visit with King Quiles DPM   [facility] ([facility])    [address] [place] [id number] [phone]            May 25, 2018 12:35 PM CDT   (Arrive by 12:20 PM)   Return Visit with King Louis MD   [facility] ([facility])    [address] [place] [address]  4th [facility] [id number] [phone]            May 30, 2018   Procedure with Guru Jose Kelly MD   [facility] Same Day Surgery (--)    [address] [address]   [phone]            Aug 01, 2018  7:30 AM CDT   Lab with  LAB   [facility] Lab ([facility])    [address] [place] [address]  1st Floor  [facility] [id number] [phone]            Aug 01, 2018  8:30 AM CDT   (Arrive by 8:15 AM)   Return General Liver with Beatriz Tanner MD   [facility] Hepatology ([facility])    [address] [place] [address]  [address]  [facility] [address] [phone]            Sep 04, 2018 12:30 PM CDT   (Arrive by 12:15 PM)   Return Seizure with  Anil English MD   [facility] Neurology ([facility])    [address]  3rd [facility] [address] [phone]              Future tests that were ordered for you today     Open Future Orders        Priority Expected Expires Ordered    Albumin Random Urine Quantitative with Creat Ratio Routine 4/26/2018 4/26/2019 4/26/2018    XR Chest 2 Views Routine 4/26/2018 4/26/2019 4/26/2018    Magnesium Routine 4/26/2018 5/10/2018 4/26/2018    TSH with free T4 reflex Routine 4/26/2018 5/10/2018 4/26/2018    MRI Abdomen w & w/o contrast* Routine  5/25/2018 4/25/2018    AFP tumor marker Routine  5/25/2018 4/25/2018    Basic metabolic panel Routine  5/25/2018 4/25/2018            Who to contact     Please call your clinic at [phone] to:    Ask questions about your health    Make or cancel appointments    Discuss your medicines    Learn about your test results    Speak to your doctor            Additional Information About Your Visit        Applimation Information     Applimation gives you secure access to your electronic health record. If you see a primary care provider, you can also send messages to your care team and make appointments. If you have questions, please call your primary care clinic.  If you do not have a primary care provider, please call [phone] and they will assist you.      Applimation is an electronic gateway that provides easy, online access to your medical records. With Applimation, you can request a clinic appointment, read your test results, renew a prescription or communicate with your care team.     To access your existing account, please contact your [facility] or call [phone] for assistance.        Care EveryWhere ID     This is your Care EveryWhere ID. This could be used by other organizations to access your [facility] medical records  [id number]        Your Vitals Were     Pulse Respirations Pulse Oximetry BMI (Body Mass Index)           64 20 97% 27.5 kg/m2         Blood Pressure from Last 3 Encounters:   04/26/18 117/73   04/25/18 125/79   04/16/18 (!) 132/95    Weight from Last 3 Encounters:   04/26/18 88.2 kg (194 lb 6.4 oz)   04/25/18 86.8 kg (191 lb 6.4 oz)   04/16/18 91 kg (200 lb 9.9 oz)              We Performed the Following     Neuropsychological testing     PHARMACY (MTM) REFERRAL          Today's Medication Changes          These changes are accurate as of 4/26/18  3:28 PM.  If you have any questions, ask your nurse or doctor.               These medicines have changed or have updated prescriptions.        Dose/Directions    cyanocobalamin 1000 MCG tablet   Commonly known as:  vitamin  B-12   This may have changed:  when to take this   Used for:  Alcoholic cirrhosis of liver with ascites (H)        Dose:  1000 mcg   1 tablet (1,000 mcg) by Per G Tube route daily   Quantity:  130 tablet   Refills:  2       XIFAXAN 550 MG Tabs tablet   This may have changed:  See the new instructions.   Used for:  Hepatic encephalopathy (H)   Generic drug:  rifaximin   Changed by:  Beatriz Tanner MD        TAKE ONE TABLET BY MOUTH TWICE DAILY   Quantity:  60 tablet   Refills:  11            Where to get your medicines      These medications were sent to [facility] [address] [place], [place] [address] [address] [address]     Phone:  [phone]     XIFAXAN 550 MG Tabs tablet                Primary Care Provider Office Phone # Fax #    King Louis [phone]       0 [address] [address]        Equal Access to Services     [facility]: Hadii kevin Thomas, wamaira lucarmina, qaybta kaaljosue ho. So [facility] [phone].    ATENCIÓN: Si habla español, tiene a chiang disposición servicios gratuitos de asistencia lingüística. Manuelito al [phone].    We comply with applicable federal civil rights laws and  [place] laws. We do not discriminate on the basis of race, color, national origin, age, disability, sex, sexual orientation, or gender identity.            Thank you!     Thank you for choosing [facility]  for your care. Our goal is always to provide you with excellent care. Hearing back from our patients is one way we can continue to improve our services. Please take a few minutes to complete the written survey that you may receive in the mail after your visit with us. Thank you!             Your Updated Medication List - Protect others around you: Learn how to safely use, store and throw away your medicines at www.disposemymeds.org.          This list is accurate as of 4/26/18  3:28 PM.  Always use your most recent med list.                   Brand Name Dispense Instructions for use Diagnosis    blood glucose monitoring test strip    ONETOUCH ULTRA    400 each    Use to test blood sugars 4 times daily as needed or as directed.    Diabetes mellitus, type 2 (H)       Calcium Carb-Cholecalciferol 500-400 MG-UNIT Tabs    calcium 500 +D    90 tablet    Take 500 mg by mouth daily    Malnutrition (H)       CANE, ANY MATERIAL     1 each    One cane    Balance disorder       ciclopirox 0.77 % cream    LOPROX    90 g    Apply topically 2 times daily To feet and toenails.    Tinea pedis of both feet       cyanocobalamin 1000 MCG tablet    vitamin  B-12    130 tablet    1 tablet (1,000 mcg) by Per G Tube route daily    Alcoholic cirrhosis of liver with ascites (H)       dulaglutide 1.5 MG/0.5ML pen    TRULICITY    6 mL    Inject 1.5 mg Subcutaneous every 7 days    DM type 2, goal HbA1c 7%-8% (H)       ferrous sulfate 325 (65 Fe) MG tablet    IRON    200 tablet    Take 1 tablet (325 mg) by mouth 2 times daily    Other iron deficiency anemia       furosemide 20 MG tablet    LASIX    30 tablet    Take 1 tablet (20 mg) by mouth daily    Other ascites       gabapentin 100 MG capsule    NEURONTIN    30 capsule     Take 1 capsule (100 mg) by mouth At Bedtime    Mild episode of recurrent major depressive disorder (H)       glipiZIDE 10 MG 24 hr tablet    GLUCOTROL XL    90 tablet    Take 1 tablet (10 mg) by mouth daily    DM type 2, goal HbA1c 7%-8% (H)       HYDROcodone-acetaminophen 5-325 MG per tablet    NORCO    60 tablet    Take 2 tablets by mouth every 6 hours as needed for moderate to severe pain    Brain tumor (H)       hydrOXYzine 25 MG tablet    ATARAX    180 tablet    Take 1 tablet (25 mg) by mouth 2 times daily    Itching, Anxiety       lactulose 10 GM/15ML solution    CHRONULAC    473 mL    Take 7.5 mLs (5 g) by mouth 2 times daily    Hepatic encephalopathy (H)       levothyroxine 88 MCG tablet    SYNTHROID/LEVOTHROID    90 tablet    Take 1 tablet (88 mcg) by mouth daily    Hypothyroidism       lidocaine 4 % Crea cream    LMX4    133 g    Apply topically once as needed for mild pain    Port catheter in place       methylphenidate 10 MG tablet    RITALIN    60 tablet    10mg twice a day    Major depressive disorder with single episode, in partial remission (H)       mirtazapine 45 MG tablet    REMERON    30 tablet    Take 1 tablet (45 mg) by mouth At Bedtime    Major depressive disorder with single episode, in partial remission (H)       multivitamin, therapeutic with minerals Tabs tablet      Take 1 tablet by mouth 2 times daily        mupirocin 2 % ointment    BACTROBAN    22 g    Use in nose 2-3 times per week    Epistaxis       omeprazole 20 MG CR capsule    priLOSEC    360 capsule    Take 2 capsules (40 mg) by mouth 2 times daily    Gastroesophageal reflux disease without esophagitis       ONETOUCH LANCETS Misc     100 each    by In Vitro route 4 times daily as needed    Type II or unspecified type diabetes mellitus without mention of complication, not stated as uncontrolled       phenytoin 30 MG CR capsule    DILANTIN    720 capsule    Take 4 capsules (120 mg) by mouth 2 times daily    Oligoastrocytoma of  parietal lobe (H)       pravastatin 80 MG tablet    PRAVACHOL    90 tablet    Take 1 tablet (80 mg) by mouth daily    High cholesterol       simethicone 80 MG chewable tablet    MYLICON    100 tablet    Take 1 tablet (80 mg) by mouth every 6 hours as needed for cramping    Abdominal cramping       spironolactone 25 MG tablet    ALDACTONE    30 tablet    Take 2 tablets (50 mg) by mouth daily    Other ascites       sulfamethoxazole-trimethoprim 800-160 MG per tablet    BACTRIM DS/SEPTRA DS    30 tablet    Take 1 tablet by mouth daily    Spontaneous bacterial peritonitis (H)       thiamine 100 MG tablet     100 tablet    Take 1 tablet (100 mg) by mouth daily    Alcoholic cirrhosis of liver with ascites (H)       traZODone 50 MG tablet    DESYREL    30 tablet    Take 1 tablet (50 mg) by mouth nightly as needed for sleep    Primary insomnia       XIFAXAN 550 MG Tabs tablet   Generic drug:  rifaximin     60 tablet    TAKE ONE TABLET BY MOUTH TWICE DAILY    Hepatic encephalopathy (H)

## 2018-04-26 NOTE — PATIENT INSTRUCTIONS
[facility]: [phone]     [facility] Medication Refill Request Information:  * Please contact your pharmacy regarding ANY request for medication refills.  ** [facility] Prescription Fax = [phone]  * Please allow 3 business days for routine medication refills.  * Please allow 5 business days for controlled substance medication refills.     [facility] Test Result notification information:  *You will be notified with in 7-10 days of your appointment day regarding the results of your test.  If you are on MyChart you will be notified as soon as the provider has reviewed the results and signed off on them.+

## 2018-04-26 NOTE — NURSING NOTE
"Chief Complaint   Patient presents with     Diabetes     follow up diabetes     Weight Loss     discuss weight loss     Pain     leg cramps off /on for overa month     Liver     discuss ascitis     Recheck Medication     discuss medications that were ordered by liver Dr. Svetlana Granda LPN 1:59 PM on 4/26/2018  Patient states \" I believe we had flu shot in the last year.\"  Svetlana Granda LPN 2:01 PM on 4/26/2018      "

## 2018-04-26 NOTE — PROGRESS NOTES
[facility]  Primary Care Center   King Louis MD  04/26/2018      Chief Complaint:   Diabetes; Weight Loss; Pain; Liver; and Recheck Medication       History of Present Illness:   Mono Varghese is a 47 year old male who presents for evaluation of multiple concerns. He has a history of type 2 diabetes, liver failure, ascites due to alcoholic cirrhosis, GERD, Pancytopenia, and other medical problems as noted below. The patient feels that his memory is worsening. He does get paracentesis on a regular basis up in [place]. He last went two days ago and got 6 Liters taken off. He has had a persistent cough which gets worse as his fluid builds up and improves somewhat after he undergoes paracentesis. We reviewed his recent laboratory results as well as his upcoming lab orders. He notes that the staff at the clinic in [place] know his name now.     They saw his Liver Specialist yesterday and was started on Lasix and Aldactone to help with fluid control. His fluid retaining has been more improved recently. They did talk extensively about his sodium level. He recently switched to diet ginger ale and this is high in sodium, so was asked to cut down to 2 cans daily and look at other options that are lower in sodium. Faby reports that he is also not always willing to eat home-cooked foods. His Liver Specialist Resident wanted them to discuss whether he needed to be on Septra, as they were not certain that he needs this. This medication was originally started prophylactically by Oncology. They are scheduled to see Dr. Shaw in Oncology on 5/4/2018.    Regarding his diabetes and weight-loss, he is down over 30 pounds since December 2017. Faby is concerned as he is not eating very well at all, he gets full very fast. He reports that he feels bloated and full very quickly. He has had lots of hypoglycemia. His last HGB A1c was 5.2% and his HGB was 12.2 on 4/11/2018. He is currently taking Trulicity 1.5 mg once  weekly and Glipizide XL 10 mg daily. Faby is concerned as his blood sugars have been as low as in the 40's at times.     He has been having frequent leg cramps. He will be laying in bed and then will try to get out of bed and develop cramps in his calves and only a couple of times in the groin area. The muscle cramps will sometimes wake him up from sleep.     Regarding his hygiene, it is very difficult to get the patient to take a bath. He recently was hired by [employer] but cannot start until his beard is shaved. It has been ten days since he decided to shave his beard, but has not done it yet. He does like the work that he will be doing.  He agrees that he needs to get rid of his beard.    He was supposed to have Neuro Psych testing which was ordered last year, during his hospitalization, but has yet to have any follow up. His wife and Faby have called the clinic, but have never gotten a return phone call.      Review of Systems:   Pertinent items are noted in HPI, remainder of complete ROS is negative.      Active Medications:   Current Outpatient Prescriptions:      blood glucose monitoring (ONETOUCH ULTRA) test strip, Use to test blood sugars 4 times daily as needed or as directed., Disp: 400 each, Rfl: 1     Calcium Carb-Cholecalciferol (CALCIUM 500 +D) 500-400 MG-UNIT TABS, Take 500 mg by mouth daily, Disp: 90 tablet, Rfl: 1     CANE, ANY MATERIAL, One cane, Disp: 1 each, Rfl: 0     ciclopirox (LOPROX) 0.77 % cream, Apply topically 2 times daily To feet and toenails., Disp: 90 g, Rfl: 4     cyanocobalamin (VITAMIN  B-12) 1000 MCG tablet, 1 tablet (1,000 mcg) by Per G Tube route daily (Patient taking differently: 1,000 mcg by Per G Tube route every morning ), Disp: 130 tablet, Rfl: 2     dulaglutide (TRULICITY) 1.5 MG/0.5ML pen, Inject 1.5 mg Subcutaneous every 7 days, Disp: 6 mL, Rfl: 0     ferrous sulfate (IRON) 325 (65 FE) MG tablet, Take 1 tablet (325 mg) by mouth 2 times daily, Disp: 200 tablet,  Rfl: 3     furosemide (LASIX) 20 MG tablet, Take 1 tablet (20 mg) by mouth daily, Disp: 30 tablet, Rfl: 3     gabapentin (NEURONTIN) 100 MG capsule, Take 1 capsule (100 mg) by mouth At Bedtime, Disp: 30 capsule, Rfl: 3     glipiZIDE (GLUCOTROL XL) 10 MG 24 hr tablet, Take 1 tablet (10 mg) by mouth daily, Disp: 90 tablet, Rfl: 1     HYDROcodone-acetaminophen (NORCO) 5-325 MG per tablet, Take 2 tablets by mouth every 6 hours as needed for moderate to severe pain, Disp: 60 tablet, Rfl: 0     hydrOXYzine (ATARAX) 25 MG tablet, Take 1 tablet (25 mg) by mouth 2 times daily, Disp: 180 tablet, Rfl: 0     lactulose (CHRONULAC) 10 GM/15ML solution, Take 7.5 mLs (5 g) by mouth 2 times daily, Disp: 473 mL, Rfl: 3     levothyroxine (SYNTHROID/LEVOTHROID) 88 MCG tablet, Take 1 tablet (88 mcg) by mouth daily, Disp: 90 tablet, Rfl: 1     lidocaine (LMX4) 4 % CREA cream, Apply topically once as needed for mild pain, Disp: 133 g, Rfl: 1     methylphenidate (RITALIN) 10 MG tablet, 10mg twice a day, Disp: 60 tablet, Rfl: 0     mirtazapine (REMERON) 45 MG tablet, Take 1 tablet (45 mg) by mouth At Bedtime, Disp: 30 tablet, Rfl: 3     multivitamin, therapeutic with minerals (THERA-VIT-M) TABS, Take 1 tablet by mouth 2 times daily, Disp: , Rfl:      mupirocin (BACTROBAN) 2 % ointment, Use in nose 2-3 times per week, Disp: 22 g, Rfl: 0     omeprazole (PRILOSEC) 20 MG CR capsule, Take 2 capsules (40 mg) by mouth 2 times daily, Disp: 360 capsule, Rfl: 3     ONE TOUCH LANCETS MISC, by In Vitro route 4 times daily as needed, Disp: 100 each, Rfl: prn     phenytoin (DILANTIN) 30 MG CR capsule, Take 4 capsules (120 mg) by mouth 2 times daily, Disp: 720 capsule, Rfl: 11     pravastatin (PRAVACHOL) 80 MG tablet, Take 1 tablet (80 mg) by mouth daily, Disp: 90 tablet, Rfl: 3     simethicone (MYLICON) 80 MG chewable tablet, Take 1 tablet (80 mg) by mouth every 6 hours as needed for cramping, Disp: 100 tablet, Rfl: 1     spironolactone (ALDACTONE) 25 MG  tablet, Take 2 tablets (50 mg) by mouth daily, Disp: 30 tablet, Rfl: 3     sulfamethoxazole-trimethoprim (BACTRIM DS/SEPTRA DS) 800-160 MG per tablet, Take 1 tablet by mouth daily, Disp: 30 tablet, Rfl: 5     thiamine (VITAMIN B-1) 100 MG tablet, Take 1 tablet (100 mg) by mouth daily, Disp: 100 tablet, Rfl: 1     traZODone (DESYREL) 50 MG tablet, Take 1 tablet (50 mg) by mouth nightly as needed for sleep, Disp: 30 tablet, Rfl: 3     XIFAXAN 550 MG TABS tablet, TAKE ONE TABLET BY MOUTH TWICE DAILY, Disp: 60 tablet, Rfl: 11      Allergies:   No clinical screening - see comments  Tegaderm transparent dressing (informational only)  Latex      Past Medical History:  Alcoholic cirrhosis of liver with ascites; cirrhosis secondary to alcohol, complicated by variceal bleeding and hepatic encephalopathy   Ascites due to alcoholic cirrhosis; Requiring regular paracentesis.  Liver failure  Hepatic encephalopathy   Gastrointestinal bleeding  Pancytopenia - chronic, secondary to liver disease since at least 2012  Obesity   Rectal bleeding   Gastroesophageal reflux disease  Portal hypertensive gastropathy  Portal vein thrombosis  Thrombocytopenia   Hx of oligoastrocytoma of parietal lobe  Partial epilepsy with impairment of consciousness  Chronic pain  Obstructive sleep apnea - moderate  Pulmonary nodules   Hyperlipidemia   Hypothyroidism   Type 2 diabetes mellitus   ADHD  Moderate major depression  Myopic astigmatism of both eyes  Presbyopia   Tinnitus      Past Surgical History:  Combined optical tracking system craniotomy, excise tumor, left stealth guided craniotomy, tumor resection  Right hand surgery    Family History:   Mother - unknown medical history   Father - Cirrhosis  Brothers x 3 - unknown medical history       Social History:   Marital Status:  to Yisel and has 1 child  Presents to the clinic accompanied by his mother-in-law, Faby, who is one of his caregivers.   Tobacco Use: never  Alcohol Use: no,  quit 01/2014  PCP: King Louis      Physical Exam:   /73 (BP Location: Right arm, Patient Position: Sitting, Cuff Size: Adult Regular)  Pulse 64  Resp 20  Wt 88.2 kg (194 lb 6.4 oz)  SpO2 97%  BMI 27.5 kg/m2   Constitutional: Alert. In no distress.  Cardiovascular: RRR. No murmurs, clicks, gallops, or rub.  Respiratory: Clear to auscultation bilaterally, no wheezes or crackles.  Gastrointestinal: normal but protuberant. Non-tender. BS normal. No masses or organomegaly.  Musculoskeletal: No gross deformities noted. Trace edema to the bilateral legs. Normal muscle tone.  Skin: No suspicious lesions. No rashes.  Neurologic: Awake, alert, baseline state of memory loss.  Psychiatric: Mentation appears normal. Normal affect.      Assessment and Plan:  Loss of weight  Recommended getting a chest film for further evaluation of his cough. Regarding his weight loss, recommended further evaluation with a CXR and TSH.  - XR Chest 2 Views  - TSH with free T4 reflex    Muscle cramp  Will check a Magnesium level at his next blood draw.  - Magnesium    Memory loss  He was referred for Neuropsych testing last year, but was never seen for this. Referral placed again.  - Neuropsychological testing    DM type 2, goal HbA1c 7%-8% (H)  Referral placed to meet with the Pharmacist to go over his medications. Given his frequent hypoglycemic episodes, he is likely being over treated.   - Albumin Random Urine Quantitative with Creat Ratio  - PHARMACY ([facility]) REFERRAL    Health Maintenance:  Colon Cancer screening (age 50 - 75): yearly FIT or colonoscopy every 5 - 10 years - Up to Date 11/27/2015  Lipid panel: every 5 years, yearly if risk factors - Up to Date 12/20/2017  Hepatitis C (adults born 1947 - 1965): once per lifetime - Up to Date 11/25/2015  HIV (ages 13 - 64): once per lifetime, yearly if MSM or other risk factors - Up to Date 11/26/2015  Immunizations (Tetanus every 10 years, Influenza yearly, Pneumonia PPV-23 and  PCV-13 age ? 65 or sooner if risk factors) - Up to Date   Immunization History   Administered Date(s) Administered     FLU 6-35 months 01/23/2018     Influenza (IIV3) PF 10/14/2013, 10/14/2014, 11/10/2015, 10/13/2016, 10/31/2017     Pneumococcal 23 valent 09/18/2012     TDAP Vaccine (Boostrix) 10/14/2014     Td (Adult), Adsorbed 06/27/2002           Follow-up: Return in about 1 month (around 5/26/2018).         Scribe Disclosure:   I, Samira Weiss, am serving as a scribe to document services personally performed by King Louis MD at this visit, based upon the provider's statements to me. All documentation has been reviewed by the aforementioned provider prior to being entered into the official medical record.     Portions of this medical record were completed by a scribe. UPON MY REVIEW AND AUTHENTICATION BY ELECTRONIC SIGNATURE, this confirms (a) I performed the applicable clinical services, and (b) the record is accurate.    King Louis MD

## 2018-05-03 NOTE — PROGRESS NOTES
[facility] utilizes an encounter to take the place of a direct phone call to your office. Please take a moment to review the below request. Please reply or route message to author of this encounter.  Message will act as a verbal OK of orders requested below. Thank you.    FYI/medication interaction    Level 2 interaction exists between spironolactone and bactrim.  Client will be discussing with Dr. Shaw tomorrow on whether he can DC the bactrim.     Angela Manley RN Case Manager  [phone]  [email or web address]

## 2018-05-03 NOTE — PROGRESS NOTES
IVAD accessed with 20 gauge 3/4inch renteria gripper plus needle.  Flushed with 10 ml Sterile 0.9% NaCl (NDC [address]-10) and dressed with sterile Tegaderm.  Blood return positive: yes  Site without redness, tenderness or swelling: yes  Flushed with 20 cc NS (NDC [address]-04)and 5 cc 100 unit/ml Heparin (NDC [address]-47)  Needle: d/c'd intact

## 2018-05-04 NOTE — MR AVS SNAPSHOT
After Visit Summary   5/4/2018    Mono Varghese    MRN: [id number]           Patient Information     Date Of Birth          1970        Visit Information        Provider Department      5/4/2018 10:45 AM Lauro Shaw MD [facility]         Follow-ups after your visit        Your next 10 appointments already scheduled     Mar 20, 2018  9:30 AM CDT   (Arrive by 9:15 AM)   Return Visit with King Louis MD   [facility] ([facility])    [address]  4th Floor  [facility] [address]   [phone]            Apr 17, 2018  9:00 AM CDT   Lab with  LAB   [facility] Lab ([facility])    [address] [place] [address]  1st Floor  [facility] [id number] [phone]            Apr 17, 2018  9:40 AM CDT   (Arrive by 9:25 AM)   Return General Liver with Beatriz Tanner MD   [facility] Hepatology ([facility])    [address] [place] [address]  [address]  [facility] [address]   [phone]            Apr 27, 2018 11:30 AM CDT   (Arrive by 11:15 AM)   MR BRAIN W/O & W CONTRAST with [address] [facility] ([facility])    [address] [place] [address]  [address] [facility] [id number] [phone]           Take your medicines as usual, unless your doctor tells you not to. Bring a list of your current medicines to your exam (including vitamins, minerals and over-the-counter drugs).  You may or may not receive intravenous (IV) contrast for this exam pending the discretion of the Radiologist.  You do not need to do anything special to prepare.  The MRI machine uses a strong magnet. Please wear clothes without metal (snaps, zippers). A sweatsuit works well, or we may give you a hospital gown.  Please remove any body piercings and hair extensions before you arrive. You will also remove watches, jewelry, hairpins, wallets, dentures, partial dental plates  and hearing aids. You may wear contact lenses, and you may be able to wear your rings. We have a safe place to keep your personal items, but it is safer to leave them at home.  **IMPORTANT** THE INSTRUCTIONS BELOW ARE ONLY FOR THOSE PATIENTS WHO HAVE BEEN PRESCRIBED SEDATION OR GENERAL ANESTHESIA DURING THEIR MRI PROCEDURE:  IF YOUR DOCTOR PRESCRIBED ORAL SEDATION (take medicine to help you relax during your exam):   You must get the medicine from your doctor (oral medication) before you arrive. Bring the medicine to the exam. Do not take it at home. You ll be told when to take it upon arriving for your exam.   Arrive one hour early. Bring someone who can take you home after the test. Your medicine will make you sleepy. After the exam, you may not drive, take a bus or take a taxi by yourself.  IF YOUR DOCTOR PRESCRIBED IV SEDATION:   Arrive one hour early. Bring someone who can take you home after the test. Your medicine will make you sleepy. After the exam, you may not drive, take a bus or take a taxi by yourself.   No eating 6 hours before your exam. You may have clear liquids up until 4 hours before your exam. (Clear liquids include water, clear tea, black coffee and fruit juice without pulp.)  IF YOUR DOCTOR PRESCRIBED ANESTHESIA (be asleep for your exam):   Arrive 1 1/2 hours early. Bring someone who can take you home after the test. You may not drive, take a bus or take a taxi by yourself.   No eating 8 hours before your exam. You may have clear liquids up until 4 hours before your exam. (Clear liquids include water, clear tea, black coffee and fruit juice without pulp.)   You will spend four to five hours in the recovery room.  Please call the Imaging Department at your exam site with any questions.            May 04, 2018 10:45 AM CDT   (Arrive by 10:30 AM)   Return Visit with Lauro Shaw MD   [facility] ([facility])    [address]  [address] [place] [address]  [place] [id number] [phone]            May 21, 2018  9:30 AM CDT   Return Visit with King Quiles DPM   [facility]    [address] [address] [place] [id number] [phone]            Sep 04, 2018 12:30 PM CDT   (Arrive by 12:15 PM)   Return Seizure with Anil English MD   [facility] Neurology ([facility])    [address] [place] [id number] [phone]              Future tests that were ordered for you today     Open Future Orders        Priority Expected Expires Ordered    UPPER GI ENDOSCOPY Routine  4/21/2018 3/7/2018            Who to contact     If you have questions or need follow up information about today's clinic visit or your schedule please contact [facility] directly at [phone].  Normal or non-critical lab and imaging results will be communicated to you by MyChart, letter or phone within 4 business days after the clinic has received the results. If you do not hear from us within 7 days, please contact the clinic through [employer] or phone. If you have a critical or abnormal lab result, we will notify you by phone as soon as possible.  Submit refill requests through [employer] or call your pharmacy and they will forward the refill request to us. Please allow 3 business days for your refill to be completed.          Additional Information About Your Visit        [facility] Information     [employer] gives you secure access to your electronic health record. If you see a primary care provider, you can also send messages to your care team and make appointments. If you have questions, please call your primary care clinic.  If you do not have a primary care provider, please call [phone] and they will assist you.        Care EveryWhere ID     This is your Care EveryWhere ID. This could be used by other organizations to access your [facility] medical records  [id number]          Blood Pressure from Last 3 Encounters:   03/07/18 135/85   03/05/18 (!) 150/101   03/01/18 124/80    Weight from Last 3 Encounters:   03/07/18 93.4 kg (206 lb)   03/07/18 93.4 kg (206 lb)   03/05/18 98.6 kg (217 lb 6 oz)              Today, you had the following     No orders found for display         Today's Medication Changes          These changes are accurate as of 3/8/18  8:06 AM.  If you have any questions, ask your nurse or doctor.               These medicines have changed or have updated prescriptions.        Dose/Directions    cyanocobalamin 1000 MCG tablet   Commonly known as:  vitamin  B-12   This may have changed:  when to take this   Used for:  Alcoholic cirrhosis of liver with ascites (H)        Dose:  1000 mcg   1 tablet (1,000 mcg) by Per G Tube route daily   Quantity:  130 tablet   Refills:  2       dulaglutide 1.5 MG/0.5ML pen   Commonly known as:  TRULICITY   This may have changed:  additional instructions   Used for:  DM type 2, goal HbA1c 7%-8% (H)        Dose:  1.5 mg   Inject 1.5 mg Subcutaneous every 7 days   Quantity:  6 mL   Refills:  1       levothyroxine 88 MCG tablet   Commonly known as:  SYNTHROID/LEVOTHROID   This may have changed:  when to take this   Used for:  Hypothyroidism        Dose:  88 mcg   Take 1 tablet (88 mcg) by mouth daily   Quantity:  90 tablet   Refills:  3                Primary Care Provider Office Phone # Fax #    King Louis [phone]       [address] [address]        Equal Access to Services     SAMUEL FAIR AH: Hadward lirao Sojanna, waaxda luqadaha, qaybta kaalmada adeegpradeep, josue idijesu tamayo. So [facility] [phone].    ATENCIÓN: Si habla español, tiene a chiang disposición servicios gratuitos de asistencia lingüística. Llame al [phone].    We comply with applicable federal civil rights laws and [place] laws. We do not discriminate on the basis of race, color, national origin,  age, disability, sex, sexual orientation, or gender identity.            Thank you!     Thank you for choosing [facility]  for your care. Our goal is always to provide you with excellent care. Hearing back from our patients is one way we can continue to improve our services. Please take a few minutes to complete the written survey that you may receive in the mail after your visit with us. Thank you!             Your Updated Medication List - Protect others around you: Learn how to safely use, store and throw away your medicines at www.disposemymeds.org.          This list is accurate as of 3/8/18  8:06 AM.  Always use your most recent med list.                   Brand Name Dispense Instructions for use Diagnosis    blood glucose monitoring test strip    ONETOUCH ULTRA    300 each    Use to test blood sugars 4 times daily as needed or as directed.    Diabetes mellitus, type 2 (H)       Calcium Carb-Cholecalciferol 500-400 MG-UNIT Tabs    calcium 500 +D    90 tablet    Take 500 mg by mouth daily    Malnutrition (H)       CANE, ANY MATERIAL     1 each    One cane    Balance disorder       ciclopirox 0.77 % cream    LOPROX    90 g    Apply topically 2 times daily To feet and toenails.    Tinea pedis of both feet       cyanocobalamin 1000 MCG tablet    vitamin  B-12    130 tablet    1 tablet (1,000 mcg) by Per G Tube route daily    Alcoholic cirrhosis of liver with ascites (H)       dulaglutide 1.5 MG/0.5ML pen    TRULICITY    6 mL    Inject 1.5 mg Subcutaneous every 7 days    DM type 2, goal HbA1c 7%-8% (H)       ferrous sulfate 325 (65 FE) MG tablet    IRON    200 tablet    Take 1 tablet (325 mg) by mouth 2 times daily    Other iron deficiency anemia       gabapentin 100 MG capsule    NEURONTIN    30 capsule    Take 1 capsule (100 mg) by mouth At Bedtime    Mild episode of recurrent major depressive disorder (H)       glipiZIDE 10 MG 24 hr tablet    GLUCOTROL XL    90 tablet    Take 1 tablet (10 mg)  by mouth daily    DM type 2, goal HbA1c 7%-8% (H)       HYDROcodone-acetaminophen 5-325 MG per tablet    NORCO    60 tablet    Take 2 tablets by mouth every 6 hours as needed for moderate to severe pain    Brain tumor (H)       hydrOXYzine 25 MG tablet    ATARAX    180 tablet    Take 1 tablet (25 mg) by mouth 2 times daily    Itching, Anxiety       lactulose 10 GM/15ML solution    CHRONULAC    473 mL    Take 7.5 mLs (5 g) by mouth 2 times daily    Hepatic encephalopathy (H)       levothyroxine 88 MCG tablet    SYNTHROID/LEVOTHROID    90 tablet    Take 1 tablet (88 mcg) by mouth daily    Hypothyroidism       lidocaine 4 % Crea cream    LMX4    133 g    Apply topically once as needed for mild pain    Port catheter in place       methylphenidate 10 MG tablet    RITALIN    60 tablet    10mg twice a day    Major depressive disorder with single episode, in partial remission (H)       mirtazapine 45 MG tablet    REMERON    30 tablet    Take 1 tablet (45 mg) by mouth At Bedtime    Major depressive disorder with single episode, in partial remission (H)       multivitamin, therapeutic with minerals Tabs tablet      Take 1 tablet by mouth 2 times daily        mupirocin 2 % ointment    BACTROBAN    22 g    Use in nose 2-3 times per week    Epistaxis       omeprazole 20 MG CR capsule    priLOSEC    360 capsule    Take 2 capsules (40 mg) by mouth 2 times daily    Gastroesophageal reflux disease without esophagitis       ONETOUCH LANCETS Misc     100 each    by In Vitro route 4 times daily as needed    Type II or unspecified type diabetes mellitus without mention of complication, not stated as uncontrolled       phenytoin 30 MG CR capsule    DILANTIN    720 capsule    Take 4 capsules (120 mg) by mouth 2 times daily    Oligoastrocytoma of parietal lobe (H)       pravastatin 80 MG tablet    PRAVACHOL    90 tablet    Take 1 tablet (80 mg) by mouth daily    High cholesterol       rifaximin 550 MG Tabs tablet    XIFAXAN    60 tablet     Take 1 tablet (550 mg) by mouth 2 times daily    Hepatic encephalopathy (H)       simethicone 80 MG chewable tablet    MYLICON    60 tablet    Take 1 tablet (80 mg) by mouth every 6 hours as needed for cramping    Abdominal cramping       sulfamethoxazole-trimethoprim 800-160 MG per tablet    BACTRIM DS/SEPTRA DS    30 tablet    Take 1 tablet by mouth daily    Spontaneous bacterial peritonitis (H)       thiamine 100 MG tablet     100 tablet    Take 1 tablet (100 mg) by mouth daily    Alcoholic cirrhosis of liver with ascites (H)       traZODone 50 MG tablet    DESYREL    30 tablet    Take 1 tablet (50 mg) by mouth nightly as needed for sleep    Primary insomnia

## 2018-05-04 NOTE — NURSING NOTE
"Oncology Rooming Note    May 4, 2018 11:17 AM   Mono Varghese is a 47 year old male who presents for:    Chief Complaint   Patient presents with     Oncology Clinic Visit     Return: Oligoastrocytoma      Initial Vitals: /76 (BP Location: Right arm, Patient Position: Chair, Cuff Size: Adult Regular)  Pulse 73  Temp 97.3  F (36.3  C) (Oral)  Resp 16  Ht 1.971 m (6' 5.6\")  Wt 88.1 kg (194 lb 3.2 oz)  SpO2 95%  BMI 22.68 kg/m2 Estimated body mass index is 22.68 kg/(m^2) as calculated from the following:    Height as of this encounter: 1.971 m (6' 5.6\").    Weight as of this encounter: 88.1 kg (194 lb 3.2 oz). Body surface area is 2.2 meters squared.  No Pain (0) Comment: Data Unavailable   No LMP for male patient.  Allergies reviewed: No  Medications reviewed: No attempted to go through charting in exam room. The computer was turned off, so I turned it on and asked pt if he had another appt, any pain level and any refills while the computer was loading, computer had loaded and went to the welcome screen and shut off. I rebooted the computer to turn on again and went through pt medications on pt snap shot that I printed off. Pt stated no changed in his medications since he comes to the clinic weekly. Dr Shaw entered the room and I explained to him that the computer had shut off twice.    Medications: Medication refills not needed today.  Pharmacy name entered into [facility]: [facility] [address] [place] [id number] [facility]    Clinical concerns: .  No new concerns   Pt received flu shot elsewhere. See Immunizations       6 minutes for nursing intake (face to face time)     Marie Clemens CMA              "

## 2018-05-04 NOTE — LETTER
5/4/2018       RE: Mono Varghese  [address] [facility] [address]     Dear Colleague,    Thank you for referring your patient, Mono Varghese, to the [facility]. Please see a copy of my visit note below.    Oncology Follow-up Visit:   May 4, 2018    Diagnosis: Left parietal oligoastrocytoma, WHO grade 3; predominantly astrocytic, and less than 10% of the specimen was oligodendroglioma. Impression:  tumor genomics: Negative for 1p/19q deletions.  Treatment to date: status post subtotal resection by Dr. J Carlos Aranda in early 06/2013.   Concurrent chemoradiation July 15 through August 23, 2013.   Initiated adjuvant oral temozolomide 9/17/13; switched to IV temozolomide due to insurance coverage. Completed adjuvant temozolomide chemotherapy Feb 2014.  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      Neurosurgeon: Dr J Carlos Aranda  Radiation Oncologist: Dr Jennyfer Martinez     Oncology History:   The patient had not really sought longitudinal medical care in the past. In autumn of 2012, he established primary care with Dr. Louis in the Primary Care Clinic. He was found to have hypertension and diabetes type 2. He is a former smoker, and he also developed some depression over the following 6 months. On 04/17/2013, he presented to the emergency room at the [facility] with an episode of right-sided weakness that was transient. He had lack of coordination, dysarthria and an odd sensation in the right hand. His wife today describes it as what she calls a seizure. He was sitting at a computer when he had the sudden onset of difficulty controlling moving the right arm. He fell out of the chair at home. The symptoms self resolved after 20 minutes. He was presumed to have a TIA. He was taken to the emergency room by his wife. He was noted to have a transient right-sided weakness and dysarthria, although he had a  normal exam on admission. He was admitted as part of a stroke code, presumed to have a TIA. MRI was performed to rule out stroke. This revealed a left parietal lobe mass. There was expansion in the gyri and T2-FLAIR hyperintensity on that side. He was hospitalized for further evaluation. He was placed on Keppra prophylactically by the neurologist, Dr. Willoughby, at 750 mg b.i.d. The transient cells had been presumed to be seizure activity once the tumor was diagnosed in the left parietal lobe. It was presumed secondary to that mass.   MRI brain was done on 04/17/2013 with and without gadolinium contrast to further elucidate this new finding. There was a large area of the left cerebral hemisphere with dissipated signal abnormality with minimal contrast enhancement. It was concerning for either a low-grade glioma versus an oligodendroglioma. There were some abnormalities consistent with glioma in the right parietal region. He was evaluated by the Neurosurgery Team during that hospital admission and evaluated for possible biopsy or resection. During that hospitalization, he was on the Vascular Neurology Service. The stroke workup was suspended once the infiltrative glioma was detected and aspirin was stopped. The patient had no further deficits on exam. He was discharged on 750 mg b.i.d. dosing of Keppra.   The patient met with Dr. J Carlos Aranda in the Outpatient Neurosurgery Clinic on 05/06/2013 for discussion of left parietal glioma. They discussed observation, biopsy versus surgical excision. The decision was made to move forward with attempt for maximal safe resection versus biopsy. The plan was to obtain a functional MRI. The patient was found to be thrombocytopenic with borderline anemia and leukopenia on 05/09/2013.   The patient met with Dr. Rafael Rodriguez, one of our hematologists here at the [facility]. Dr. Rodriguez wrote an extensive detailed note reviewing the patient's medications. He felt the Seroquel  the patient had been on for depression was potentially mildly myelosuppressive, but it would have been uncommon to have such a alisha specific with platelets while on that dose. Another concern was that the patient had been having borderline low counts for years, and perhaps had not been detected as the patient had not been seeking regular medical care. He felt the glioma would only extremely rarely involve bone marrow and result in cytopenia, and he felt the Keppra would have been an unusual presentation; however, rarely as well, the possibility of myelodysplastic syndrome or concurrent aplastic anemia could not be excluded.   A followup MRI was obtained on 05/16/2013 in anticipation of surgery on 06/06/2013. The patient went forward with Stealth-assisted left parietal craniotomy and tumor resection by Dr. J Carlos Aranda. The patient had been started 2 days preoperatively on dexamethasone 6 mg b.i.d., and in addition prednisone 1 mg/kg per day at the recommendation per Dr. Rodriguez for the pancytopenia representing possibly welcomed compensated ITP.   Dr. Aranda performed an excisional left parietooccipital tumor. Per his operative report, he noted piecemeal resection for what possibly is the result of a gross total resection. On pathology, there were some mixed features of glioma with oligoastrocytoma, and 1p/19q deletions were assessed. There was no evidence of deletions in either 1p nor 19q. The tumor was unusual in that it was predominantly astrocytic, and less than 10% of the specimen was oligodendroglioma. 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 There were only rare mitotic figures present. There was no evidence of tumor necrosis or neurovascular proliferation. Postop MRI shows status post left craniotomy with only some residual signal abnormalities, likely representing residual tumor. It could have been a subtotal  resection as well. Postoperatively, the patient developed steroid-induced hyperglycemia requiring insulin drip.   From 06/11 through 06/21, he was hospitalized at a rehabilitation facility for mild weakness in the right arm and difficulties with speech. He was having expressive aphasia in the postoperative setting, mild dysarthria, right-sided hemiparesis, right-sided neglect. The patient has undergone excellent PT and OT, which continues at home since his discharge. He was discharged to home on 06/21/2013 on prophylactic Keppra. What had been characterized as a right lower extremity involuntary twitching was all prior to surgery, and he has had no seizure activity or loss of consciousness or any tonic-clonic seizure-like activity since surgery and surgical removal of the tumor. Patient met with Dr. Martinez from Radiation Oncology with plans to start postoperative radiation on or around Monday 07/08/2013.   6/27/13--Neuro-Oncology/medical oncology consultation, Dr Lauro Shaw.  7/5/13-Palliative Care consult/counseling - FRANKLYN Ogden. Restarted Cymbalta.  7/15/13--Neuro-surgery clinic follow-up, Dr Aranda.  7/23/13--Oncology follow-up, JOSE Abarca.   7/25/13--Hematology follow-up, Dr Rodriguez, re: ITP.  8/13/13-- Oncology follow-up, JOSE Abarca. worsening expressive aphasia and left hand tremors. Dexamethasone increased to 4 mg po bid.   RADIATION COURSE: ~7/15/13- -8/23/13.  Treatment Summary   Site: Partial Brain  Current Dose: 6000 / 6000 cGy  Fractions: 30/30 8/29/13--Oncology follow-up, JOSE Abarca. patient ran out of temozolomide final week of radiation - did not take this medication. Patient started on Keflex x 7 days for wound dehiscence.  8/30/13-- Neuro-surgery clinic follow-up, Dr Aranda. Evaluation of craniotomy incision. Assessment: Scalp wound dehiscence, possible wound infection.   9/6/13-- Neuro-surgery clinic follow-up, Dr Aranda. Evaluation of craniotomy incision.  Assessment: early healing secondarily.  9/6/13--MRI brain--(two weeks following completion of chemoXRT): Impression: Surgical cavity in the left parietal lobe, with areas of enhancement at the margins of the surgical cavity and areas of abnormal T2 signal in the corpus callosum and left cerebral hemisphere. These findings are unchanged compared to the previous scan. The areas of T2 signal abnormality around the surgical cavity could represent areas of infiltrative tumor, edema, or radiation  induced changes. However, as previously mentioned, since the primary tumor demonstrated only mild contrast enhancement, this enhancement is favored to represent treatment related change. Continued followup is recommended.  9/16/13--Neuro-Oncology/medical oncology follow-up, Dr Shaw. Proceed with cycle 1/day 1 adjuvant 5-day temozolomide (150 mg/m2 daily on days 1-5 with  first cycle; then 200 mg/m2 daily on days 1-5 starting with cycle 2).  9/19/13--Hematology follow-up, Dr Rodriguez. Reason: history of thrombocytopenia.  10/14/13--[facility] follow-up, Dr Aranda.   10/14/13--Oncology follow-up, JOSE Abarca. cycle 2/day 1 adjuvant 5-day temozolomide  10/14/13-MRI brain---stable-- Resection cavity in the left parieto-occipital region with areas of  enhancement along the margin of the surgical cavity not significantly changed since prior study. Interval decrease in T2 hyperintense signal in the left frontoparietal subcortical and periventricular white matter, likely representing decreasing edema. No new areas of abnormal enhancement are identified. Stable small extra-axial fluid collection deep to the craniotomy site.  11/11/13- Neuro-Oncology/medical oncology follow-up, Dr Shaw.  cycle 3/day 1 adjuvant 5-day temozolomide.  12/3/13--Cycle 4/day 1 adjuvant temozolomide---switched to IV (Days 1-5) due to lack of insurance coverage for oral medication.  12/6/13--Nutrition consultation--Judy Valdez RD.   12/9/13--Oncology  follow-up, JOSE Abarca.  patient c/o worsened short-term memory and expressive aphasia/word-finding difficulties. Started Megace 400mg daily for appetite/anorexia.  12/19/13--Brain MRI---- Impression:  1. The left parietooccipital region resection cavity with persistent areas of nodular enhancement along the resection cavity are not significantly changed since 10/14/2013 but are slightly less  pronounced when compared to 8/21/2013; these findings likely reflect post therapy changes.  2. Worsening T2 hyperintense signal abnormality in the splenium and adjacent periatrial white matter bilaterally (left greater than right); uncertain whether this could represent worsening primary brain tumor or if this could be therapy related.  12/26/13--Oncology follow-up, Dr Shaw.  1/10/14--Nutrition consultation--Judy Valdez RD.    1/30/14--Oncology follow-up, JOSE Abarca.    2/6/14--cycle 6/day 1 temozolomide IV.  2/17/14--Palliative care follow-up, Dr Napoles.  Referral to neuropsychiatry.  2/17/14--MRI brain--- 1. Stable left parieto-occipital resection cavity. There is persistent  areas of curvilinear enhancement along the margins of resection  cavity, not significantly changed since most MRI.  2. Stable T2 hyperintensity adjacent to the resection cavity extending  into the splenium of the corpus callosum and the right periatrial  white matter medially, left frontal periventricular white matter  superiorly. These may represent edema/gliosis,however tumor  infiltration cannot be completely ruled out.  2/20/14--oncology follow-up, Dr Shaw. Plan - start active surveillance; discontinue IV temozolomide due to completion of planned 6-month course.  3/20/14--NO-SHOW to scheduled oncology follow-up, JOSE Abarca.  3/25/14--NO-SHOW to scheduled oncology follow-up, JOSE Abarca.  3/27/14-- oncology follow-up, JOSE Abarca. concern for anhedonia, depression, question of possible seizure  activity. Referred for Psychiatric assessment.  4/7/14--brain MRI---Postsurgical changes over the left parietal region, with a stable surgical cavity in the left parietal lobe.  4/14/14-- oncology follow-up, Dr Shaw.  5/7/14 - - neurology consultation, Dr. Devendra Quiroz; Dr Patrick Rogers. 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 Initiate Depakote 500 mg b.i.d.; EEG; consider discontinuation of Wellbutrin.  6/5/14-- primary care follow-up, Dr. King Louis. Recs: trazodone increased for insomnia; wean off Wellbutrin over one month; increased Cymbalta.  6/9/14 - - EEG performed. Results: abnormal EEG due to the presence of asymmetry of the background activity with left hemisphere theta slowing, which is consistent with patient's history of surgery. No epileptiform activities were observed.  6/9/14-- brain MRI: Impression: Stable post surgical changes of left parietal craniotomy   and WHO grade 3 astrocytoma resection. FLAIR hyperintensity  surrounding the resection cavity, extending anteriorly is unchanged.  Curvilinear contrast enhancement along the anterior inferior margin of   the resection cavity is also little changed.   6/19/14 - - oncology follow-up, Dr. Shaw.  Summer 2014: patient was undergoing home occupational therapy, but declined further assistance.  9/8/14-- brain MRI done for surveillance of brain tumor: Impression:   1. Stable postoperative changes compared to MRI study on 6/9/2014.   2. Superior to the surgical cavity there is a gyrus with T2 FLAIR  hyperintensity (series 401 and image 22), may represent residual tumor  however has been decreasing in expansion and conspicuity since the   previous several studies.   3. FLAIR hyperintense in the white matter adjacent to surgical cavity,  stable, may represent gliosis and/or low grade infiltrative tumor.   Continued follow up is recommended.  9/8/14 - - ER evaluation for nausea and vomiting, speech deficit, and  extremity weakness. Family reported new right-sided weakness and speech difficulty. MRI repeated with stroke protocol - - no evidence of stroke. Neurology was consulted: symptoms potentially due to seizure activity, in patient with known history of seizures in brain tumor. Plan was to admit patient to neurology service for EEG.  9/8/14-- brain MRI done in ER to rule out stroke: IMPRESSION   Impression: No evidence of stroke.   9/11/14 - - EEG performed: IMPRESSION: This is an abnormal video EEG due to the presence of mild diffuse nonspecific encephalopathy. There was an additional polymorphic delta activity over the left hemisphere and slight increased amplitude there (breach effect), consistent with the patient's history of craniotomy and resection of astrocytoma. No electrographic seizure or paroxysmal behavioral events were recorded.  9/15/14 - - neuro-oncology/oncology follow-up, Dr. Shaw.   9/11/14 - - EEG performed: IMPRESSION: This is an abnormal video EEG due to the presence of mild diffuse nonspecific encephalopathy. There was an additional polymorphic delta activity over the left hemisphere and slight increased amplitude there (breach effect), consistent with the patient's history of craniotomy and resection of astrocytoma. No electrographic seizure or paroxysmal behavioral events were recorded.   9/15/14 - - neuro-oncology/oncology follow-up, Dr. Shaw.   September 2014 - - patient reportedly developed a full-blown generalized tonic clonic seizure in his right arm and hand, spreading to face and legs became generalized. He was started on Depakote 500 mg b.i.d.  10/6/14-- neurology clinic consultation, Dr. English. Plan: remain on Depakote.  12/15/14 - - telephone note documenting increased short-term memory loss over two weeks.  12/20/14--brain MRI--Impression:   1. Stable postoperative changes of a left parietal resection cavity.   Redemonstration of T2 hyperintensity in the gyrus immediately superior   to the  resection cavity without corresponding contrast enhancement.   Findings are stable since MR on 9/8/2014. However, differential still   includes gliosis versus residual tumor bed.   2. There is also unchanged T2 hyperintensity within the white matter   adjacent to the surgical resection site which may have minimally   decreased in conspicuity since prior study. Consider gliosis versus   residual neoplastic process.   12/22/14 - - neuro-oncology/oncology follow-up, Dr. Shaw. Patient was a no-show to this scheduled appointment.  1/5/15 - - neurology clinic follow-up, Dr. English. Assessment: alcohol abuse, recognition to cut down. Resume Depakote 500 mg BID for seizures.  1/5/15 - - oncology follow-up, JOSE Abarca. Patient continues short-term memory loss and needs help medications.  5/1/15 - - oncology follow-up, JOSE Abarca. Early evaluation for left temporal headaches.  5/1/15 - - brain MRI: Impression:   1. Left parietal resection cavity without evidence of tumor recurrence.   2. Persistent T2 white matter changes in both cerebral hemispheres, left greater than right, likely treatment related change.  5/6/15 - - psychiatry follow-up.  5/11/2015 - - neuro-oncology/oncology follow-up, Dr. Shaw. NO-SHOW.  6/13/15 - - ER evaluation for seizure, in form of right arm shaking and slurred speech.  7/13/15 - - neurology consultation, Dr. Ingrid Schulte. Plan: EEG, check valproate level.  9/10/15 - - brain MRI: IMPRESSION  Impression: Left parietal resection cavity without evidence of tumor  recurrence.  9/14/15 - - neuro-oncology/oncology follow-up, Dr. Shaw.  2/29/16--brain MRI - Impression: Unchanged postoperative changes of the parietal craniotomy and left oligoastrocytoma resection. No evidence for recurrent tumor.  3/7/16-- neuro-oncology/oncology follow-up, Dr. Shaw.  2/29/16--brain MRI - Impression: Unchanged postoperative changes of the parietal craniotomy.  3/1/17--brain MRI: Impression:   1. No significant  change in T2 hyperintense signal about the left  parietal resection cavity dating back to 12/20/2014. No evidence of  tumor progression.  2. Stable posttreatment related white matter changes since 2/29/2016.  3/6/17-- neuro-oncology follow up, Dr. Shaw. Patient no-show.  5/5/17 - rescheduled neuro-oncology follow-up, Dr. Shaw  5/3/18 - - brain MRI: Impression:  No significant change in T2 hyperintense signal abnormality  surrounding the left parietal resection cavity dating back to at least  12/20/2014. No evidence of tumor recurrence/progression.  5/4/18 - - neuro-oncology/medical oncology follow-up, Dr. Shaw.      History Of Present Illness:   Mr. Mono Varghese is a 47-year-old  gentleman, whose medical history is pertinent for a history of tobacco abuse, depression, type 2 diabetes, cirrhosis and hypertension. He had suicidal ideation in early Spring of 2013 related to his occupation. He also has a history of obstructive sleep apnea. Leading up to diagnosis, he had seizures, TIA, diagnosis of left parietal oligoastrocytoma with history of complications of possible ITP that was well compensated while on steroids.     Mr. Varghese returns today in the company of his mother-in-law, who I am meeting for the first time.  His wife is at work.  I last evaluated Mr. Varghese a year ago.  He has ongoing and, in fact, worsening of many underlying comorbidities, the most prominent of this is diabetes as managed by his primary care physician, Dr. Louis, and also advancing cirrhosis under the excellent care also of Dr. Beatriz Tanner from our Gastroenterology team.      The patient's mother-in-law states on behalf of Mr. Varghese, who generally has had difficulty which is chronic and not acute in terms of a fluid conversation, that could be interpreted as expressive aphasia secondary to the surgery and subsequent treatment for his oligoastrocytoma in the parietal lobe.      In general, his mother-in-law explains  that he has had periods of low sugars.  Blood sugars being specifically in the 40-50 range, which he believes is due to particularly aggressive management of the diabetes.  I encouraged her to speak with patient's primary care physician regarding this issue.  As a result, he has had some episodes which he characterizes as dizziness, but without vertigo and some mild falls.  He has not had any bone breakage or fractures.      The most recent brain MRI done yesterday does not show any evidence of recurrent disease in or around the resection cavity.  Of note, as of this summer, he will have reached 5 years since completion of his adjuvant chemoradiation and as of this past February, he has completed 4 years since his adjuvant therapy course was completed.  He denies any pain at this time.  Most of his issues relate to abdominal swelling, secondary to ascites from the cirrhosis for which he does weekly paracentesis to relieve the pressure.             Review Of Systems:   Full 10-point review of systems was performed. Pertinent symptoms are reviewed above per HPI. The wife states the patient had suicidal ideations in March or 04/2013 due to the stresses from his participation in the [employer].     Past medical, social, surgical, and family histories reviewed.   PAST MEDICAL HISTORY:   1. Hypertension.   2. Depression.   3. Diabetes.   4. Former tobacco user.   5. TIA and seizures that led to his diagnosis of left parietal glioma; also concerning likely seizure activity leading to ER evaluation 9/8/2014.  6. Thrombocytopenia of unclear duration. Possible well compensated ITP evaluated by Dr. Rafael Rodriguez.   7. Patient has a history of suicidal ideation, according to his wife. When asked in detail, the patient states that this occurred around March or 04/2013 and was unrelated to physical symptoms, but more to his job in the [employer].   8. He had developed exertional dyspnea at one point with  pretty vigorous exercise as well, but not further worked up.   9. He does have a history of reported as moderate obstructive sleep apnea.   10. History of left biopsy of the left nasal naris in 09/2012 showed squamous mucosa with ulceration and granulation tissue. Negative for malignancy.   11. Hypothyroidism - on levothyroxine supplementation.  12. Lower extremity DVT - on enoxaparin  13. Imbalance/falls of unclear etiology  14. Cirrhosis - follows with Dr. Tanner from GI    PAST SURGICAL HISTORY:   1. Hand surgery, not otherwise specified.   2. On 06/06/2013, Stealth-assisted left parietal craniotomy and tumor resection by Dr. J Carlos Aranda, as noted above per HPI.   FAMILY HISTORY: The patient is adopted. His biological family history is unknown, in terms of malignancy.   SOCIAL HISTORY: He lives in [place] with his wife. He has been in the [employer] in [place] for about 8 or 9 years. Tobacco: Cigars for 3 years, quit in 06/2012. Alcohol: He stopped drinking alcohol several months ago. Drugs: Denies illicit drug use. Denies IV drug use specifically.       Allergies:   Allergies as of 09/15/2013       (No Known Allergies)      Current Medications:   Current Outpatient Prescriptions    Medication  Sig       DULoxetine (CYMBALTA) 60 MG capsule  Take 1 capsule (60 mg) by mouth daily       omeprazole (PRILOSEC) 40 MG capsule  Take 1 capsule (40 mg) by mouth daily       pravastatin (PRAVACHOL) 80 MG tablet  Take 1 tablet (80 mg) by mouth daily       glucose blood VI test strips (TRUE CARE TEST STRIP PACK) strip  by In Vitro route 4 times daily as needed       LANCETS ULTRA FINE MISC  1 Device 4 times daily as needed Use as directed       glipiZIDE (GLUCOTROL XL) 10 MG 24 hr tablet  Take 1 tablet (10 mg) by mouth daily       Multiple Vitamin (MULTI-VITAMIN PO)        dexamethasone (DECADRON) 2 MG tablet  Take 2 tablets 3 times daily until Monday, then take 2 tablets twice daily for 1 week, then 1 tablet in the  "AM and PM for one week, then one tablet in the AM for 1 week, then stop.       DULoxetine (CYMBALTA) 60 MG capsule  Take by mouth daily. Once daily       omeprazole (PRILOSEC) 40 MG capsule  Take by mouth daily.       levETIRAcetam (KEPPRA) 750 MG tablet  Take 1 tablet by mouth 2 times daily for 30 days.       glipiZIDE (GLUCOTROL XL) 10 MG 24 hr tablet  Take 1 tablet by mouth daily (with breakfast) for 30 days.       metFORMIN (FORTAMET) 1000 MG 24 hr tablet  Take 1,000 mg by mouth daily (with breakfast) for 30 days.       traZODone (DESYREL) 50 MG tablet  Take 1 tablet by mouth nightly as needed for sleep.       buPROPion (WELLBUTRIN SR) 150 MG 12 hr tablet  Take 450 mg by mouth daily.      Physical Exam:   /76 (BP Location: Right arm, Patient Position: Chair, Cuff Size: Adult Regular)  Pulse 73  Temp 97.3  F (36.3  C) (Oral)  Resp 16  Ht 1.971 m (6' 5.6\")  Wt 88.1 kg (194 lb 3.2 oz)  SpO2 95%  BMI 22.68 kg/m2    KPS: 60  Focused exam:  GENERAL: Very pleasant, middle-aged  gentleman. He is able to hold conversation and appears to have basic comprehension of our discussion today.  HEENT: PERRLA - mucus membranes moist. No evidence of visual field deficits.  NEUROLOGIC: Cranial Nerves II-XII grossly intact. Negative Romberg sign; mild right sided dysmetria which is asymmetric and very much consistent with my previously documented exams, none noted on the left. No dysdiadochokinesia. No evident asterixis.      Laboratory/Imaging Studies   I personally reviewed the brain MRI that had been performed on 5/3/18.  I provided a printed copy of that report.  I reviewed it with them in great detail, to their stated satisfaction and understanding.     Results for orders placed or performed in visit on 05/03/18   AFP tumor marker   Result Value Ref Range    Alpha Fetoprotein 2.7 0 - 8 ug/L   Basic metabolic panel   Result Value Ref Range    Sodium 138 133 - 144 mmol/L    Potassium 4.3 3.4 - 5.3 mmol/L    " Chloride 107 94 - 109 mmol/L    Carbon Dioxide 23 20 - 32 mmol/L    Anion Gap 8 3 - 14 mmol/L    Glucose 179 (H) 70 - 99 mg/dL    Urea Nitrogen 11 7 - 30 mg/dL    Creatinine 0.90 0.66 - 1.25 mg/dL    GFR Estimate 90 >60 mL/min/1.7m2    GFR Estimate If Black >90 >60 mL/min/1.7m2    Calcium 8.0 (L) 8.5 - 10.1 mg/dL   Albumin Random Urine Quantitative with Creat Ratio   Result Value Ref Range    Creatinine Urine 326 mg/dL    Albumin Urine mg/L 10 mg/L    Albumin Urine mg/g Cr 3.07 0 - 17 mg/g Cr   Magnesium   Result Value Ref Range    Magnesium 1.6 1.6 - 2.3 mg/dL   TSH with free T4 reflex   Result Value Ref Range    TSH 6.73 (H) 0.40 - 4.00 mU/L   T4 free   Result Value Ref Range    T4 Free 0.89 0.76 - 1.46 ng/dL     *Note: Due to a large number of results and/or encounters for the requested time period, some results have not been displayed. A complete set of results can be found in Results Review.               ASSESSMENT/PLAN:   47-year-old  gentleman with a history of subtotal versus gross total resection on 06/06/2013 by Dr. J Carlos Aranda of a left parietal oligoastrocytoma. Probably less than 10% of the features are oligodendroglioma, but the majority is astrocytic. 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 The tumor was negative for 1p/19q deletions. The patient has had excessive aphasia, seizures and right hemiparesis at diagnosis, and largely resolved following surgery and an aggressive rehabilitation.  S/p six cycles of adjuvant oral/IV temozolomide as of February 2014.    Remarkably, he is more than 4 years out from completion of adjuvant chemotherapy without evidence of recurrence of his parietal brain tumor.  At this point, his most significant comorbidities are systemic and not neurologic or neuro-oncologic in nature and thus a priority appropriately should be placed in that direction.  His mother-in-law stated full  understanding and agreement with this concept and notion.      He is under the excellent care of Sally Louis and Flores for medical oncologic problems.  The problems that she describes to me today that cause her the most concern are the hypoglycemic episodes that lead to some shaking and falls and altered mental status that is temporary, as well as the significant cirrhosis requiring paracentesis.  I encouraged her to follow up with those physicians as appropriate for those non-oncologic issues.  In terms of neuro-oncologic followup, I think a 1-year followup would be appropriate.  For that reason I have ordered an MRI brain to be performed in 1 year's time and then follow up with me within 1 week to review the results.  I encouraged them to get this done at the [facility] facility rather than any other satellite sites due to our specialized MRI protocols for examining recurrence in brain tumor patients.  She stated her full understanding and agreement with this plan.  We will move forward as above.       I spent greater than 30 minutes in consultation, including history and physical, and 25 minutes in discussion. Over 50% of time was spent in counseling and coordination of care.     MATT JORDAN MD, PhD         cc: J Carlos Aranda MD   [facility]   Department of Neurosurgery     Remigio Willoughby MD   [facility]   Department of Neurology

## 2018-05-04 NOTE — PROGRESS NOTES
Oncology Follow-up Visit:   May 4, 2018    Diagnosis: Left parietal oligoastrocytoma, WHO grade 3; predominantly astrocytic, and less than 10% of the specimen was oligodendroglioma. Impression:  tumor genomics: Negative for 1p/19q deletions.  Treatment to date: status post subtotal resection by Dr. J Carlos Aranda in early 06/2013.   Concurrent chemoradiation July 15 through August 23, 2013.   Initiated adjuvant oral temozolomide 9/17/13; switched to IV temozolomide due to insurance coverage. Completed adjuvant temozolomide chemotherapy Feb 2014.  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      Neurosurgeon: Dr J Carlos Aranda  Radiation Oncologist: Dr Jennyfer Martinez     Oncology History:   The patient had not really sought longitudinal medical care in the past. In autumn of 2012, he established primary care with Dr. Louis in the [facility]. He was found to have hypertension and diabetes type 2. He is a former smoker, and he also developed some depression over the following 6 months. On 04/17/2013, he presented to the emergency room at the [facility] with an episode of right-sided weakness that was transient. He had lack of coordination, dysarthria and an odd sensation in the right hand. His wife today describes it as what she calls a seizure. He was sitting at a computer when he had the sudden onset of difficulty controlling moving the right arm. He fell out of the chair at home. The symptoms self resolved after 20 minutes. He was presumed to have a TIA. He was taken to the emergency room by his wife. He was noted to have a transient right-sided weakness and dysarthria, although he had a normal exam on admission. He was admitted as part of a stroke code, presumed to have a TIA. MRI was performed to rule out stroke. This revealed a left parietal lobe mass. There was expansion in the gyri and T2-FLAIR hyperintensity on that  side. He was hospitalized for further evaluation. He was placed on Keppra prophylactically by the neurologist, Dr. Willoughby, at 750 mg b.i.d. The transient cells had been presumed to be seizure activity once the tumor was diagnosed in the left parietal lobe. It was presumed secondary to that mass.   MRI brain was done on 04/17/2013 with and without gadolinium contrast to further elucidate this new finding. There was a large area of the left cerebral hemisphere with dissipated signal abnormality with minimal contrast enhancement. It was concerning for either a low-grade glioma versus an oligodendroglioma. There were some abnormalities consistent with glioma in the right parietal region. He was evaluated by the Neurosurgery Team during that hospital admission and evaluated for possible biopsy or resection. During that hospitalization, he was on the Vascular Neurology Service. The stroke workup was suspended once the infiltrative glioma was detected and aspirin was stopped. The patient had no further deficits on exam. He was discharged on 750 mg b.i.d. dosing of Keppra.   The patient met with Dr. J Carlos Aranda in the Outpatient Neurosurgery Clinic on 05/06/2013 for discussion of left parietal glioma. They discussed observation, biopsy versus surgical excision. The decision was made to move forward with attempt for maximal safe resection versus biopsy. The plan was to obtain a functional MRI. The patient was found to be thrombocytopenic with borderline anemia and leukopenia on 05/09/2013.   The patient met with Dr. Rafael Rodriguez, one of our hematologists here at the [facility]. Dr. Rodriguez wrote an extensive detailed note reviewing the patient's medications. He felt the Seroquel the patient had been on for depression was potentially mildly myelosuppressive, but it would have been uncommon to have such a alisha specific with platelets while on that dose. Another concern was that the patient had been having borderline  low counts for years, and perhaps had not been detected as the patient had not been seeking regular medical care. He felt the glioma would only extremely rarely involve bone marrow and result in cytopenia, and he felt the Keppra would have been an unusual presentation; however, rarely as well, the possibility of myelodysplastic syndrome or concurrent aplastic anemia could not be excluded.   A followup MRI was obtained on 05/16/2013 in anticipation of surgery on 06/06/2013. The patient went forward with Stealth-assisted left parietal craniotomy and tumor resection by Dr. J Carlos Aranda. The patient had been started 2 days preoperatively on dexamethasone 6 mg b.i.d., and in addition prednisone 1 mg/kg per day at the recommendation per Dr. Rodriguez for the pancytopenia representing possibly welcomed compensated ITP.   Dr. Aranda performed an excisional left parietooccipital tumor. Per his operative report, he noted piecemeal resection for what possibly is the result of a gross total resection. On pathology, there were some mixed features of glioma with oligoastrocytoma, and 1p/19q deletions were assessed. There was no evidence of deletions in either 1p nor 19q. The tumor was unusual in that it was predominantly astrocytic, and less than 10% of the specimen was oligodendroglioma. 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 There were only rare mitotic figures present. There was no evidence of tumor necrosis or neurovascular proliferation. Postop MRI shows status post left craniotomy with only some residual signal abnormalities, likely representing residual tumor. It could have been a subtotal resection as well. Postoperatively, the patient developed steroid-induced hyperglycemia requiring insulin drip.   From 06/11 through 06/21, he was hospitalized at a rehabilitation facility for mild weakness in the right arm and difficulties  with speech. He was having expressive aphasia in the postoperative setting, mild dysarthria, right-sided hemiparesis, right-sided neglect. The patient has undergone excellent PT and OT, which continues at home since his discharge. He was discharged to home on 06/21/2013 on prophylactic Keppra. What had been characterized as a right lower extremity involuntary twitching was all prior to surgery, and he has had no seizure activity or loss of consciousness or any tonic-clonic seizure-like activity since surgery and surgical removal of the tumor. Patient met with Dr. Martinez from Radiation Oncology with plans to start postoperative radiation on or around Monday 07/08/2013.   6/27/13--Neuro-Oncology/medical oncology consultation, Dr Lauro Shaw.  7/5/13-Palliative Care consult/counseling - FRANKLYN Ogden. Restarted Cymbalta.  7/15/13--Neuro-surgery clinic follow-up, Dr Aranda.  7/23/13--Oncology follow-up, JOSE Abarca.   7/25/13--Hematology follow-up, Dr Rodriguez, re: ITP.  8/13/13-- Oncology follow-up, JOSE Abarca. worsening expressive aphasia and left hand tremors. Dexamethasone increased to 4 mg po bid.   RADIATION COURSE: ~7/15/13- -8/23/13.  Treatment Summary   Site: Partial Brain  Current Dose: 6000 / 6000 cGy  Fractions: 30/30 8/29/13--Oncology follow-up, JOSE Abarca. patient ran out of temozolomide final week of radiation - did not take this medication. Patient started on Keflex x 7 days for wound dehiscence.  8/30/13-- Neuro-surgery clinic follow-up, Dr Aranda. Evaluation of craniotomy incision. Assessment: Scalp wound dehiscence, possible wound infection.   9/6/13-- Neuro-surgery clinic follow-up, Dr Aranda. Evaluation of craniotomy incision. Assessment: early healing secondarily.  9/6/13--MRI brain--(two weeks following completion of chemoXRT): Impression: Surgical cavity in the left parietal lobe, with areas of enhancement at the margins of the surgical cavity and areas of abnormal T2  signal in the corpus callosum and left cerebral hemisphere. These findings are unchanged compared to the previous scan. The areas of T2 signal abnormality around the surgical cavity could represent areas of infiltrative tumor, edema, or radiation  induced changes. However, as previously mentioned, since the primary tumor demonstrated only mild contrast enhancement, this enhancement is favored to represent treatment related change. Continued followup is recommended.  9/16/13--Neuro-Oncology/medical oncology follow-up, Dr Shaw. Proceed with cycle 1/day 1 adjuvant 5-day temozolomide (150 mg/m2 daily on days 1-5 with  first cycle; then 200 mg/m2 daily on days 1-5 starting with cycle 2).  9/19/13--Hematology follow-up, Dr Rodriguez. Reason: history of thrombocytopenia.  10/14/13--[facility] follow-up, Dr Aranda.   10/14/13--Oncology follow-up, JOSE Abarca. cycle 2/day 1 adjuvant 5-day temozolomide  10/14/13-MRI brain---stable-- Resection cavity in the left parieto-occipital region with areas of  enhancement along the margin of the surgical cavity not significantly changed since prior study. Interval decrease in T2 hyperintense signal in the left frontoparietal subcortical and periventricular white matter, likely representing decreasing edema. No new areas of abnormal enhancement are identified. Stable small extra-axial fluid collection deep to the craniotomy site.  11/11/13- Neuro-Oncology/medical oncology follow-up, Dr Shaw.  cycle 3/day 1 adjuvant 5-day temozolomide.  12/3/13--Cycle 4/day 1 adjuvant temozolomide---switched to IV (Days 1-5) due to lack of insurance coverage for oral medication.  12/6/13--Nutrition consultation--Judy Valdez RD.   12/9/13--Oncology follow-up, JOSE Abarca.  patient c/o worsened short-term memory and expressive aphasia/word-finding difficulties. Started Megace 400mg daily for appetite/anorexia.  12/19/13--Brain MRI---- Impression:  1. The left parietooccipital region  resection cavity with persistent areas of nodular enhancement along the resection cavity are not significantly changed since 10/14/2013 but are slightly less  pronounced when compared to 8/21/2013; these findings likely reflect post therapy changes.  2. Worsening T2 hyperintense signal abnormality in the splenium and adjacent periatrial white matter bilaterally (left greater than right); uncertain whether this could represent worsening primary brain tumor or if this could be therapy related.  12/26/13--Oncology follow-up, Dr Shaw.  1/10/14--Nutrition consultation--Judy Valdez RD.    1/30/14--Oncology follow-up, JOSE Abarca.    2/6/14--cycle 6/day 1 temozolomide IV.  2/17/14--Palliative care follow-up, Dr Napoles.  Referral to neuropsychiatry.  2/17/14--MRI brain--- 1. Stable left parieto-occipital resection cavity. There is persistent  areas of curvilinear enhancement along the margins of resection  cavity, not significantly changed since most MRI.  2. Stable T2 hyperintensity adjacent to the resection cavity extending  into the splenium of the corpus callosum and the right periatrial  white matter medially, left frontal periventricular white matter  superiorly. These may represent edema/gliosis,however tumor  infiltration cannot be completely ruled out.  2/20/14--oncology follow-up, Dr Shaw. Plan - start active surveillance; discontinue IV temozolomide due to completion of planned 6-month course.  3/20/14--NO-SHOW to scheduled oncology follow-up, JOSE Abarca.  3/25/14--NO-SHOW to scheduled oncology follow-up, JOSE Abarca.  3/27/14-- oncology follow-up, JOSE Abarca. concern for anhedonia, depression, question of possible seizure activity. Referred for Psychiatric assessment.  4/7/14--brain MRI---Postsurgical changes over the left parietal region, with a stable surgical cavity in the left parietal lobe.  4/14/14-- oncology follow-up, Dr Shaw.  5/7/14 - - neurology consultation,  Dr. Devendra Quiroz; Dr Patrick Rogers. 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 Initiate Depakote 500 mg b.i.d.; EEG; consider discontinuation of Wellbutrin.  6/5/14-- primary care follow-up, Dr. King Louis. Recs: trazodone increased for insomnia; wean off Wellbutrin over one month; increased Cymbalta.  6/9/14 - - EEG performed. Results: abnormal EEG due to the presence of asymmetry of the background activity with left hemisphere theta slowing, which is consistent with patient's history of surgery. No epileptiform activities were observed.  6/9/14-- brain MRI: Impression: Stable post surgical changes of left parietal craniotomy   and WHO grade 3 astrocytoma resection. FLAIR hyperintensity  surrounding the resection cavity, extending anteriorly is unchanged.  Curvilinear contrast enhancement along the anterior inferior margin of   the resection cavity is also little changed.   6/19/14 - - oncology follow-up, Dr. Shaw.  Summer 2014: patient was undergoing home occupational therapy, but declined further assistance.  9/8/14-- brain MRI done for surveillance of brain tumor: Impression:   1. Stable postoperative changes compared to MRI study on 6/9/2014.   2. Superior to the surgical cavity there is a gyrus with T2 FLAIR  hyperintensity (series 401 and image 22), may represent residual tumor  however has been decreasing in expansion and conspicuity since the   previous several studies.   3. FLAIR hyperintense in the white matter adjacent to surgical cavity,  stable, may represent gliosis and/or low grade infiltrative tumor.   Continued follow up is recommended.  9/8/14 - - ER evaluation for nausea and vomiting, speech deficit, and extremity weakness. Family reported new right-sided weakness and speech difficulty. MRI repeated with stroke protocol - - no evidence of stroke. Neurology was consulted: symptoms potentially due to seizure activity, in patient with known history of seizures  in brain tumor. Plan was to admit patient to neurology service for EEG.  9/8/14-- brain MRI done in ER to rule out stroke: IMPRESSION   Impression: No evidence of stroke.   9/11/14 - - EEG performed: IMPRESSION: This is an abnormal video EEG due to the presence of mild diffuse nonspecific encephalopathy. There was an additional polymorphic delta activity over the left hemisphere and slight increased amplitude there (breach effect), consistent with the patient's history of craniotomy and resection of astrocytoma. No electrographic seizure or paroxysmal behavioral events were recorded.  9/15/14 - - neuro-oncology/oncology follow-up, Dr. Shaw.   9/11/14 - - EEG performed: IMPRESSION: This is an abnormal video EEG due to the presence of mild diffuse nonspecific encephalopathy. There was an additional polymorphic delta activity over the left hemisphere and slight increased amplitude there (breach effect), consistent with the patient's history of craniotomy and resection of astrocytoma. No electrographic seizure or paroxysmal behavioral events were recorded.   9/15/14 - - neuro-oncology/oncology follow-up, Dr. Shaw.   September 2014 - - patient reportedly developed a full-blown generalized tonic clonic seizure in his right arm and hand, spreading to face and legs became generalized. He was started on Depakote 500 mg b.i.d.  10/6/14-- neurology clinic consultation, Dr. English. Plan: remain on Depakote.  12/15/14 - - telephone note documenting increased short-term memory loss over two weeks.  12/20/14--brain MRI--Impression:   1. Stable postoperative changes of a left parietal resection cavity.   Redemonstration of T2 hyperintensity in the gyrus immediately superior   to the resection cavity without corresponding contrast enhancement.   Findings are stable since MR on 9/8/2014. However, differential still   includes gliosis versus residual tumor bed.   2. There is also unchanged T2 hyperintensity within the white matter    adjacent to the surgical resection site which may have minimally   decreased in conspicuity since prior study. Consider gliosis versus   residual neoplastic process.   12/22/14 - - neuro-oncology/oncology follow-up, Dr. Shaw. Patient was a no-show to this scheduled appointment.  1/5/15 - - neurology clinic follow-up, Dr. English. Assessment: alcohol abuse, recognition to cut down. Resume Depakote 500 mg BID for seizures.  1/5/15 - - oncology follow-up, JOSE Abarca. Patient continues short-term memory loss and needs help medications.  5/1/15 - - oncology follow-up, JOSE Abarca. Early evaluation for left temporal headaches.  5/1/15 - - brain MRI: Impression:   1. Left parietal resection cavity without evidence of tumor recurrence.   2. Persistent T2 white matter changes in both cerebral hemispheres, left greater than right, likely treatment related change.  5/6/15 - - psychiatry follow-up.  5/11/2015 - - neuro-oncology/oncology follow-up, Dr. Shaw. NO-SHOW.  6/13/15 - - ER evaluation for seizure, in form of right arm shaking and slurred speech.  7/13/15 - - neurology consultation, Dr. Ingrid Schulte. Plan: EEG, check valproate level.  9/10/15 - - brain MRI: IMPRESSION  Impression: Left parietal resection cavity without evidence of tumor  recurrence.  9/14/15 - - neuro-oncology/oncology follow-up, Dr. Shaw.  2/29/16--brain MRI - Impression: Unchanged postoperative changes of the parietal craniotomy and left oligoastrocytoma resection. No evidence for recurrent tumor.  3/7/16-- neuro-oncology/oncology follow-up, Dr. Shaw.  2/29/16--brain MRI - Impression: Unchanged postoperative changes of the parietal craniotomy.  3/1/17--brain MRI: Impression:   1. No significant change in T2 hyperintense signal about the left  parietal resection cavity dating back to 12/20/2014. No evidence of  tumor progression.  2. Stable posttreatment related white matter changes since 2/29/2016.  3/6/17-- neuro-oncology follow up,   Valeria. Patient no-show.  5/5/17 - rescheduled neuro-oncology follow-up, Dr. Shaw  5/3/18 - - brain MRI: Impression:  No significant change in T2 hyperintense signal abnormality  surrounding the left parietal resection cavity dating back to at least  12/20/2014. No evidence of tumor recurrence/progression.  5/4/18 - - neuro-oncology/medical oncology follow-up, Dr. Shaw.      History Of Present Illness:   Mr. Mono Varghese is a 47-year-old  gentleman, whose medical history is pertinent for a history of tobacco abuse, depression, type 2 diabetes, cirrhosis and hypertension. He had suicidal ideation in early Spring of 2013 related to his occupation. He also has a history of obstructive sleep apnea. Leading up to diagnosis, he had seizures, TIA, diagnosis of left parietal oligoastrocytoma with history of complications of possible ITP that was well compensated while on steroids.     Mr. Varghese returns today in the company of his mother-in-law, who I am meeting for the first time.  His wife is at work.  I last evaluated Mr. Varghese a year ago.  He has ongoing and, in fact, worsening of many underlying comorbidities, the most prominent of this is diabetes as managed by his primary care physician, Dr. Louis, and also advancing cirrhosis under the excellent care also of Dr. Beatriz Tanner from our Gastroenterology team.      The patient's mother-in-law states on behalf of Mr. Varghese, who generally has had difficulty which is chronic and not acute in terms of a fluid conversation, that could be interpreted as expressive aphasia secondary to the surgery and subsequent treatment for his oligoastrocytoma in the parietal lobe.      In general, his mother-in-law explains that he has had periods of low sugars.  Blood sugars being specifically in the 40-50 range, which he believes is due to particularly aggressive management of the diabetes.  I encouraged her to speak with patient's primary care physician regarding this  issue.  As a result, he has had some episodes which he characterizes as dizziness, but without vertigo and some mild falls.  He has not had any bone breakage or fractures.      The most recent brain MRI done yesterday does not show any evidence of recurrent disease in or around the resection cavity.  Of note, as of this summer, he will have reached 5 years since completion of his adjuvant chemoradiation and as of this past February, he has completed 4 years since his adjuvant therapy course was completed.  He denies any pain at this time.  Most of his issues relate to abdominal swelling, secondary to ascites from the cirrhosis for which he does weekly paracentesis to relieve the pressure.             Review Of Systems:   Full 10-point review of systems was performed. Pertinent symptoms are reviewed above per HPI. The wife states the patient had suicidal ideations in March or 04/2013 due to the stresses from his participation in the [employer].     Past medical, social, surgical, and family histories reviewed.   PAST MEDICAL HISTORY:   1. Hypertension.   2. Depression.   3. Diabetes.   4. Former tobacco user.   5. TIA and seizures that led to his diagnosis of left parietal glioma; also concerning likely seizure activity leading to ER evaluation 9/8/2014.  6. Thrombocytopenia of unclear duration. Possible well compensated ITP evaluated by Dr. Rafael Rodriguez.   7. Patient has a history of suicidal ideation, according to his wife. When asked in detail, the patient states that this occurred around March or 04/2013 and was unrelated to physical symptoms, but more to his job in the [employer].   8. He had developed exertional dyspnea at one point with pretty vigorous exercise as well, but not further worked up.   9. He does have a history of reported as moderate obstructive sleep apnea.   10. History of left biopsy of the left nasal naris in 09/2012 showed squamous mucosa with ulceration and  granulation tissue. Negative for malignancy.   11. Hypothyroidism - on levothyroxine supplementation.  12. Lower extremity DVT - on enoxaparin  13. Imbalance/falls of unclear etiology  14. Cirrhosis - follows with Dr. Tanner from GI    PAST SURGICAL HISTORY:   1. Hand surgery, not otherwise specified.   2. On 06/06/2013, Stealth-assisted left parietal craniotomy and tumor resection by Dr. J Carlos Aranda, as noted above per HPI.   FAMILY HISTORY: The patient is adopted. His biological family history is unknown, in terms of malignancy.   SOCIAL HISTORY: He lives in [place] with his wife. He has been in the [employer] in [place] for about 8 or 9 years. Tobacco: Cigars for 3 years, quit in 06/2012. Alcohol: He stopped drinking alcohol several months ago. Drugs: Denies illicit drug use. Denies IV drug use specifically.       Allergies:   Allergies as of 09/15/2013       (No Known Allergies)      Current Medications:   Current Outpatient Prescriptions    Medication  Sig       DULoxetine (CYMBALTA) 60 MG capsule  Take 1 capsule (60 mg) by mouth daily       omeprazole (PRILOSEC) 40 MG capsule  Take 1 capsule (40 mg) by mouth daily       pravastatin (PRAVACHOL) 80 MG tablet  Take 1 tablet (80 mg) by mouth daily       glucose blood VI test strips (TRUE CARE TEST STRIP PACK) strip  by In Vitro route 4 times daily as needed       LANCETS ULTRA FINE MISC  1 Device 4 times daily as needed Use as directed       glipiZIDE (GLUCOTROL XL) 10 MG 24 hr tablet  Take 1 tablet (10 mg) by mouth daily       Multiple Vitamin (MULTI-VITAMIN PO)        dexamethasone (DECADRON) 2 MG tablet  Take 2 tablets 3 times daily until Monday, then take 2 tablets twice daily for 1 week, then 1 tablet in the AM and PM for one week, then one tablet in the AM for 1 week, then stop.       DULoxetine (CYMBALTA) 60 MG capsule  Take by mouth daily. Once daily       omeprazole (PRILOSEC) 40 MG capsule  Take by mouth daily.       levETIRAcetam (KEPPRA) 750  "MG tablet  Take 1 tablet by mouth 2 times daily for 30 days.       glipiZIDE (GLUCOTROL XL) 10 MG 24 hr tablet  Take 1 tablet by mouth daily (with breakfast) for 30 days.       metFORMIN (FORTAMET) 1000 MG 24 hr tablet  Take 1,000 mg by mouth daily (with breakfast) for 30 days.       traZODone (DESYREL) 50 MG tablet  Take 1 tablet by mouth nightly as needed for sleep.       buPROPion (WELLBUTRIN SR) 150 MG 12 hr tablet  Take 450 mg by mouth daily.      Physical Exam:   /76 (BP Location: Right arm, Patient Position: Chair, Cuff Size: Adult Regular)  Pulse 73  Temp 97.3  F (36.3  C) (Oral)  Resp 16  Ht 1.971 m (6' 5.6\")  Wt 88.1 kg (194 lb 3.2 oz)  SpO2 95%  BMI 22.68 kg/m2    KPS: 60  Focused exam:  GENERAL: Very pleasant, middle-aged  gentleman. He is able to hold conversation and appears to have basic comprehension of our discussion today.  HEENT: PERRLA - mucus membranes moist. No evidence of visual field deficits.  NEUROLOGIC: Cranial Nerves II-XII grossly intact. Negative Romberg sign; mild right sided dysmetria which is asymmetric and very much consistent with my previously documented exams, none noted on the left. No dysdiadochokinesia. No evident asterixis.      Laboratory/Imaging Studies   I personally reviewed the brain MRI that had been performed on 5/3/18.  I provided a printed copy of that report.  I reviewed it with them in great detail, to their stated satisfaction and understanding.     Results for orders placed or performed in visit on 05/03/18   AFP tumor marker   Result Value Ref Range    Alpha Fetoprotein 2.7 0 - 8 ug/L   Basic metabolic panel   Result Value Ref Range    Sodium 138 133 - 144 mmol/L    Potassium 4.3 3.4 - 5.3 mmol/L    Chloride 107 94 - 109 mmol/L    Carbon Dioxide 23 20 - 32 mmol/L    Anion Gap 8 3 - 14 mmol/L    Glucose 179 (H) 70 - 99 mg/dL    Urea Nitrogen 11 7 - 30 mg/dL    Creatinine 0.90 0.66 - 1.25 mg/dL    GFR Estimate 90 >60 mL/min/1.7m2    GFR " Estimate If Black >90 >60 mL/min/1.7m2    Calcium 8.0 (L) 8.5 - 10.1 mg/dL   Albumin Random Urine Quantitative with Creat Ratio   Result Value Ref Range    Creatinine Urine 326 mg/dL    Albumin Urine mg/L 10 mg/L    Albumin Urine mg/g Cr 3.07 0 - 17 mg/g Cr   Magnesium   Result Value Ref Range    Magnesium 1.6 1.6 - 2.3 mg/dL   TSH with free T4 reflex   Result Value Ref Range    TSH 6.73 (H) 0.40 - 4.00 mU/L   T4 free   Result Value Ref Range    T4 Free 0.89 0.76 - 1.46 ng/dL     *Note: Due to a large number of results and/or encounters for the requested time period, some results have not been displayed. A complete set of results can be found in Results Review.               ASSESSMENT/PLAN:   47-year-old  gentleman with a history of subtotal versus gross total resection on 06/06/2013 by Dr. J Carlos Aranda of a left parietal oligoastrocytoma. Probably less than 10% of the features are oligodendroglioma, but the majority is astrocytic. 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 The tumor was negative for 1p/19q deletions. The patient has had excessive aphasia, seizures and right hemiparesis at diagnosis, and largely resolved following surgery and an aggressive rehabilitation.  S/p six cycles of adjuvant oral/IV temozolomide as of February 2014.    Remarkably, he is more than 4 years out from completion of adjuvant chemotherapy without evidence of recurrence of his parietal brain tumor.  At this point, his most significant comorbidities are systemic and not neurologic or neuro-oncologic in nature and thus a priority appropriately should be placed in that direction.  His mother-in-law stated full understanding and agreement with this concept and notion.      He is under the excellent care of Sally Louis and Flores for medical oncologic problems.  The problems that she describes to me today that cause her the most concern are the  hypoglycemic episodes that lead to some shaking and falls and altered mental status that is temporary, as well as the significant cirrhosis requiring paracentesis.  I encouraged her to follow up with those physicians as appropriate for those non-oncologic issues.  In terms of neuro-oncologic followup, I think a 1-year followup would be appropriate.  For that reason I have ordered an MRI brain to be performed in 1 year's time and then follow up with me within 1 week to review the results.  I encouraged them to get this done at the [facility] facility rather than any other satellite sites due to our specialized MRI protocols for examining recurrence in brain tumor patients.  She stated her full understanding and agreement with this plan.  We will move forward as above.           I spent greater than 30 minutes in consultation, including history and physical, and 25 minutes in discussion. Over 50% of time was spent in counseling and coordination of care.     cc: J Carlos Aranda MD   [facility]   Department of Neurosurgery     Remigio Willoughby MD   [facility]   Department of Neurology         MATT JORDAN MD, PhD

## 2018-05-09 NOTE — PROGRESS NOTES
We could do lab, ammonia level, to make sure controlled w/ lactulose use, although generally if ammonia rises the pt has obvious decline mental acuity, so generally once it is established that the ammonia is high and the pt needs lactulose, it can be managed based on symptoms?  If so, can [facility] do that lab, or would he have to go in somewhere with a lab? I believe it needs to be on ice. Wt loss is likely from the paracentesis.

## 2018-05-09 NOTE — PROGRESS NOTES
[facility] utilizes an encounter to take the place of a direct phone call to your office. Please take a moment to review the below request. Please reply or route message to author of this encounter.  Message will act as a verbal OK of orders requested below. Thank you.    Patient Update/FYI:    Client weight this AM is 185lbs.  This is down 9lbs from last week. Client did have a paracentesis yesterday and has been taking lasix/spironolactone which are likely contributing to weight loss.  Per spouse, appetite has improved and client is having at least one good meal per day and eating peanut butter toast in the AM.      Client also reporting stools have been formed and not loose despite lactulose use.  Spouse reports that she has been giving 20cc every AM and over the weekend increased this to 30ccs with no change to stool status.  Client/spouse are concerned by this and that the lactulose is not working.  Per spouse, client has not had any increased confusion aside from increased anger/outbursts.  She has been attempting to get him in for neuropsych testing.     Angela Manley RN Case Manager  [phone]  [email or web address]

## 2018-05-09 NOTE — TELEPHONE ENCOUNTER
[facility] Call Center    Phone Message    May a detailed message be left on voicemail: yes    Reason for Call: Other: Angela is trying to get a hold of dr johnson about lab to answer his questions or talk to Nurse ravi acevedo. Please call angela al.     Action Taken: Message routed to:  [facility] ([facility]): soumya

## 2018-05-09 NOTE — PROGRESS NOTES
[facility] utilizes an encounter to take the place of a direct phone call to your office. Please take a moment to review the below request. Please reply or route message to author of this encounter.  Message will act as a verbal OK of orders requested below. Thank you.    Regarding home care checking ammonia level, writer did contact the lab. This does need to be on ice and will need to be centrifuged within 15 mins of the draw.  Being that there is a time limit, it will be best to have client come into clinic lab for draw.  He does not live far from the [facility].  Could you enter orders in for lab draw and have clinic staff follow up with client to come in for lab draw?  Writer is out of the office tomorrow and will not be able to follow up with patient/caregiver.    Angela Manley RN Case Manager  [phone]  [email or web address]

## 2018-05-17 NOTE — TELEPHONE ENCOUNTER
[facility] Call Center    Phone Message    May a detailed message be left on voicemail: yes    Reason for Call: Other: Please call Home Care Nurse to discuss symptoms/care of plan.  Patient is having firm stool.      Action Taken: Message routed to:  [facility] jacqui

## 2018-05-17 NOTE — TELEPHONE ENCOUNTER
Writer returned patient's home care nurse's (Keri) call.  Keri stated that patient is still reporting constipation/firm stools on the 30 mL lactulose twice daily.  Left Keri know that she can go up to 35 mL and can titrate more or less depending on stools.  Keri expressed understanding and had no further questions.

## 2018-05-18 NOTE — PROGRESS NOTES
This RNCC called the patient to discuss the below information. The direct phone number for the patient or his wife to reach this RNCC back was left with a request to call back at their earliest convenience.     Patient is scheduled for EGD on May 30 at 12:10 pm with arrival time of 10:10 am.    This RNCC discussed with the patient the need for a pre-op physical with her primary care provider. Patient was also educated on colonoscopy prep and importance of this prep.      Patient called and is amenable to plan as noted and aware of the following:  Procedure explained to patient.  This is a non-urgent procedure.   They can expect a call for date and time for procedure.   They will need a , someone to stay with them for 24 hours and to stay in town for 24 hours post procedure, rational explained.   Will get pre-op physical done locally and will fax a copy to us along with bringing a hard copy with them. Fax number given. [phone]     Blood thinner - No  ASA - No  Diabetic - Yes- please contact your managing provider on how to manage these mediations prior to your procedure.   A pre-op nurse will call 1-2 days prior to the procedure.   Is advised to be NPO/solid food at midnight before the procedure in the event the procedure time is moved up. Ok to drink clear liquids up to 4 hours prior to procedure.      Advised post procedure to start with clear liquids and advance diet slowly as tolerated.  It is normal to have a sore throat after the procedure.   May also have some muscle tenderness from positioning on the table.      Aware this RN will call within 2-3 days post procedure to see how they are doing.     The contact information for this RNCC was left with a request to callback at their earliest convenience.

## 2018-05-23 NOTE — TELEPHONE ENCOUNTER
Writer returned patient's wife's call.  Wife had questions about recent MRI results.  Informed wife that per Dr. Tanner, MRI shows a nonocclusive thrombus which is common in cirrhotic patients.  No cancer was seen butl does not mean cancer cannot develop in the future so will have to continue to monitor every 6 months.  Wife expressed understanding and had no further questions.

## 2018-05-24 NOTE — PROGRESS NOTES
[facility] utilizes an encounter to take the place of a direct phone call to your office. Please take a moment to review the below request. Please reply or route message to author of this encounter.  Message will act as a verbal OK of orders requested below. Thank you.    ORDER  SN 1 x a week for 9 weeks with 2 prn    Patient currently taking Lactulose 30ml twice a day can tirate this until patient has 3 to 4 bowel movements a day per Dr. Tanner    [facility] POC  Access Device PORT OPEN 1L    HEPARIN 100 UNITS/ML 5ML 5 ML IV Use for flushing  Normal Saline Flush (SODIUM CHLORIDE FLUSH) 1 KIT IV Use for port access    Action: Admission  Anticipated length of therapy: Line care up to 1 year through 9/26/2018  Flushing orders: Per I policy  IV Access, site care per I protocol  IV Access, site care; RN will do site care to the access device per [facility] policy and procedure  Site Care supplies for access device  Supplies to administer ordered therapy per I policy  Use Alteplase per [facility] policy to treat clotted venous catheter.    RECERT on 5/24/18  Situation...Patient alert and oriented and cognition/clarity varies according to caregivers. Most recently this has been doing better.   He recently has had his lactulose dose adjusted to 30cc BID and this can be titrated until he is having 3 to 4 bowel movements per day.  Leanne does not track his bowel movements and is not good about sharing these details with his caregivers making it difficult to determine frequency/amount.  Client resides in a town house with spouse who is his PCA. Clients mother in law is also his PCA.  VSS,  lungs CTA.  Dry cough cough has mostly resolved.  Client continues to have an every other week paracentesis averaging 6 to 8L out per time.  When ascites build up, client experiences weight gain, decrease in appetite, and occaional abdominal pain with some SOB.  He was receiving a paracentesis weekly, but this has been decreased since starting  oral diuretic therapy.  Over this past CERT period, client has lost weight.  His appetite appetite fluctuates often and spouse reports lately he has been eating well and she is ensuring that he is taking in appropriate nutrients. There are times where he will simply refust to eat however.  He has also been consuming a lot of candy suckers lately affecting his BG.  Weight today is 188.4lbs which is down from 204 at last CERT period.   Over this past CERT period, client did have one endoscopy and plan is for another one on 5/30.  [place] is denying pain at visit today.   Spouse and Mother in Law continue to pass meds to ensure these are being taken correctly and on time.  Home care is setting up weekly.  Skin CDI aside from a skin tear/scrap to his left knee associated from a fall.  No falls have occurred this past week, but the week prior he had 3 to 4 falls.  Spouse did clarify that he did not hit his head in any of those falls.  lood sugars have have improved over this past CERT were doing well in the beginning ranging from 90 to 130, however ocer the past few weeks he has been back in the 200s and has been inconsistent in checking.  Writer reviewed the meter and there was only 4 readings from the past two weeks.  Edu provided on need for a daily check to better track BG and DM management.  Client denies recent headaches.   Ambulating with cane or walker as needed and remains unsteady on is feet.  Falls that did occur over this past CERT period, client was not using an assitive device.  Home care has not been flushing PORT as this is being done every other week at his paracentesis.  Client also continues to meet with his councelor and reports  this is  going well. He has expressed to spouse and writer that he feels he is dying/body shutting down. Writer has encouraged sada to discuss this with his councelor.  Active listening provided today by writer. Client is sleeping approx 10 hours per  night and  denies  concerns with sleep.  Background Hx of type 2  diabetes without mention of complication, heptatic encephalopathy, Alcoholic cirrhosis of liver with ascites complicated by variceal bleeding and hepatic encephalopathy, gastrointestnial  bleeding,  malnutrition, partial epilepsy with impairment of consciousness, major depressive disorder reccurent episode/moderate,  sleep disturbances, generalized  pain, BLE edema, venous thromboembolism with SMV and portal vein occlusion on Lovenox   injections  Analysis Pt is at   risk for rehospitalization d/t   high  risk medications, risk for falls, chronic   comorbidities  Recommedation SN for medication  management/education, chronic disease management/education, weight checks, compliance with   BG monitoring, abdominal girth   measurements, fall assessment,   home  safety  Expected length of home care is   ongoing for assess/educatin of chronic illnesses and  medication mngmt with set up.    Angela Manley RN Case Manager  [phone]  [email or web address]

## 2018-05-25 NOTE — MR AVS SNAPSHOT
After Visit Summary   5/25/2018    Mono Varghese    MRN: [id number]           Patient Information     Date Of Birth          1970        Visit Information        Provider Department      5/25/2018 12:35 PM King Louis MD [facility]        Today's Diagnoses     Alcoholic cirrhosis of liver with ascites (H)    -  1      Care Instructions    Primary Care Center: [phone]     Primary Care Center Medication Refill Request Information:  * Please contact your pharmacy regarding ANY request for medication refills.  ** [facility] Prescription Fax = [phone]  * Please allow 3 business days for routine medication refills.  * Please allow 5 business days for controlled substance medication refills.     Primary Care Center Test Result notification information:  *You will be notified with in 7-10 days of your appointment day regarding the results of your test.  If you are on MyChart you will be notified as soon as the provider has reviewed the results and signed off on them.          Follow-ups after your visit        Follow-up notes from your care team     Return in about 1 month (around 6/25/2018).      Your next 10 appointments already scheduled     May 25, 2018  1:15 PM CDT   LAB with  LAB   [facility] Lab ([facility])    [address] [place] [address] [phone]           Please do not eat 10-12 hours before your appointment if you are coming in fasting for labs on lipids, cholesterol, or glucose (sugar). This does not apply to pregnant women. Water, hot tea and black coffee (with nothing added) are okay. Do not drink other fluids, diet soda or chew gum.            May 29, 2018 12:30 PM CDT   (Arrive by 12:15 PM)   New Patient Visit with Marie Babb PsyD   [facility] Neuropsychology ([facility])    [address] [place] [address] [phone]            May 30,  2018   Procedure with Guru Jose Kelly MD   [facility] [place], Same Day Surgery (--)    [address]  [facility] [address]   [phone]            Jun 22, 2018 12:35 PM CDT   (Arrive by 12:20 PM)   Return Visit with King Louis MD   [facility] ([place] [facility])    [address]  4th Floor  [facility] [address]   [phone]            Aug 01, 2018  7:30 AM CDT   Lab with  LAB   [facility] Lab ([facility])    [address]  1st Floor  [facility] [address]   [phone]            Aug 01, 2018  8:30 AM CDT   (Arrive by 8:15 AM)   Return General Liver with Beatriz Tanner MD   [facility] Hepatology ([facility])    [address]  [address]  [facility] [address]   [phone]            Sep 04, 2018 12:30 PM CDT   (Arrive by 12:15 PM)   Return Seizure with Anil English MD   [facility] Neurology ([place] [facility])    [address]  3rd Floor  [facility] [address]   [phone]            Apr 26, 2019 10:00 AM CDT   MR BRAIN W/O & W CONTRAST with [address] MRI ([facility])    [address]  1st [facility] [address] [phone]           Take your medicines as usual, unless your doctor tells you not to. Bring a list of your current medicines to your exam (including vitamins, minerals and over-the-counter drugs).  You may or may not receive intravenous (IV) contrast for this exam pending the discretion of the Radiologist.  You do not need to do anything special to prepare.  The MRI machine uses a strong magnet. Please wear clothes without metal (snaps, zippers). A sweatsuit works well, or we may give you a hospital gown.  Please remove any body piercings and hair extensions before you arrive. You will also remove watches, jewelry, hairpins, wallets, dentures,  partial dental plates and hearing aids. You may wear contact lenses, and you may be able to wear your rings. We have a safe place to keep your personal items, but it is safer to leave them at home.  **IMPORTANT** THE INSTRUCTIONS BELOW ARE ONLY FOR THOSE PATIENTS WHO HAVE BEEN PRESCRIBED SEDATION OR GENERAL ANESTHESIA DURING THEIR MRI PROCEDURE:  IF YOUR DOCTOR PRESCRIBED ORAL SEDATION (take medicine to help you relax during your exam):   You must get the medicine from your doctor (oral medication) before you arrive. Bring the medicine to the exam. Do not take it at home. You ll be told when to take it upon arriving for your exam.   Arrive one hour early. Bring someone who can take you home after the test. Your medicine will make you sleepy. After the exam, you may not drive, take a bus or take a taxi by yourself.  IF YOUR DOCTOR PRESCRIBED IV SEDATION:   Arrive one hour early. Bring someone who can take you home after the test. Your medicine will make you sleepy. After the exam, you may not drive, take a bus or take a taxi by yourself.   No eating 6 hours before your exam. You may have clear liquids up until 4 hours before your exam. (Clear liquids include water, clear tea, black coffee and fruit juice without pulp.)  IF YOUR DOCTOR PRESCRIBED ANESTHESIA (be asleep for your exam):   Arrive 1 1/2 hours early. Bring someone who can take you home after the test. You may not drive, take a bus or take a taxi by yourself.   No eating 8 hours before your exam. You may have clear liquids up until 4 hours before your exam. (Clear liquids include water, clear tea, black coffee and fruit juice without pulp.)   You will spend four to five hours in the recovery room.  Please call the Imaging Department at your exam site with any questions.            May 03, 2019 10:45 AM CDT   (Arrive by 10:30 AM)   Return Visit with Lauro Shaw MD   [facility]    [address] [place] [address]    [address]  [facility] [id number] [phone]              Future tests that were ordered for you today     Open Future Orders        Priority Expected Expires Ordered    CBC with platelets Routine 5/25/2018 6/8/2018 5/25/2018    Basic metabolic panel Routine 5/25/2018 6/8/2018 5/25/2018    INR Routine 5/25/2018 5/25/2019 5/25/2018            Who to contact     Please call your clinic at [phone] to:    Ask questions about your health    Make or cancel appointments    Discuss your medicines    Learn about your test results    Speak to your doctor            Additional Information About Your Visit        [employer] Information     [employer] gives you secure access to your electronic health record. If you see a primary care provider, you can also send messages to your care team and make appointments. If you have questions, please call your primary care clinic.  If you do not have a primary care provider, please call [phone] and they will assist you.      [employer] is an electronic gateway that provides easy, online access to your medical records. With [employer], you can request a clinic appointment, read your test results, renew a prescription or communicate with your care team.     To access your existing account, please contact your [facility] or call [phone] for assistance.        Care EveryWhere ID     This is your Care EveryWhere ID. This could be used by other organizations to access your [facility] medical records  [id number]        Your Vitals Were     Pulse Respirations Pulse Oximetry BMI (Body Mass Index)          77 16 96% 22.15 kg/m2         Blood Pressure from Last 3 Encounters:   05/25/18 122/71   05/04/18 123/76   04/26/18 117/73    Weight from Last 3 Encounters:   05/25/18 86 kg (189 lb 11.2 oz)   05/04/18 88.1 kg (194 lb 3.2 oz)   04/26/18 88.2 kg (194 lb 6.4 oz)                 Today's Medication Changes          These changes are accurate as of 5/25/18   1:08 PM.  If you have any questions, ask your nurse or doctor.               These medicines have changed or have updated prescriptions.        Dose/Directions    cyanocobalamin 1000 MCG tablet   Commonly known as:  vitamin  B-12   This may have changed:  when to take this   Used for:  Alcoholic cirrhosis of liver with ascites (H)        Dose:  1000 mcg   1 tablet (1,000 mcg) by Per G Tube route daily   Quantity:  130 tablet   Refills:  2         Stop taking these medicines if you haven't already. Please contact your care team if you have questions.     glipiZIDE 10 MG 24 hr tablet   Commonly known as:  GLUCOTROL XL   Stopped by:  King Louis MD                    Primary Care Provider Office Phone # Fax #    King Louis [phone]        [address] [address]        Equal Access to Services     [facility]: Hadii aad ku hadasho Sojanna, waaxda luqadaha, qaybta kaalmada kamla, josue roque . So [facility] [phone].    ATENCIÓN: Si habla español, tiene a chiang disposición servicios gratuitos de asistencia lingüística. [facility] [phone].    We comply with applicable federal civil rights laws and [place] laws. We do not discriminate on the basis of race, color, national origin, age, disability, sex, sexual orientation, or gender identity.            Thank you!     Thank you for choosing [facility]  for your care. Our goal is always to provide you with excellent care. Hearing back from our patients is one way we can continue to improve our services. Please take a few minutes to complete the written survey that you may receive in the mail after your visit with us. Thank you!             Your Updated Medication List - Protect others around you: Learn how to safely use, store and throw away your medicines at www.disposemymeds.org.          This list is accurate as of 5/25/18  1:08 PM.  Always use your most recent  med list.                   Brand Name Dispense Instructions for use Diagnosis    blood glucose monitoring test strip    ONETOUCH ULTRA    400 each    Use to test blood sugars 4 times daily as needed or as directed.    Diabetes mellitus, type 2 (H)       Calcium Carb-Cholecalciferol 500-400 MG-UNIT Tabs    calcium 500 +D    90 tablet    Take 500 mg by mouth daily    Malnutrition (H)       CANE, ANY MATERIAL     1 each    One cane    Balance disorder       ciclopirox 0.77 % cream    LOPROX    90 g    Apply topically 2 times daily To feet and toenails.    Tinea pedis of both feet       cyanocobalamin 1000 MCG tablet    vitamin  B-12    130 tablet    1 tablet (1,000 mcg) by Per G Tube route daily    Alcoholic cirrhosis of liver with ascites (H)       dulaglutide 1.5 MG/0.5ML pen    TRULICITY    6 mL    Inject 1.5 mg Subcutaneous every 7 days    DM type 2, goal HbA1c 7%-8% (H)       ferrous sulfate 325 (65 Fe) MG tablet    IRON    200 tablet    Take 1 tablet (325 mg) by mouth 2 times daily    Other iron deficiency anemia       furosemide 20 MG tablet    LASIX    30 tablet    Take 1 tablet (20 mg) by mouth daily    Other ascites       gabapentin 100 MG capsule    NEURONTIN    30 capsule    Take 1 capsule (100 mg) by mouth At Bedtime    Mild episode of recurrent major depressive disorder (H)       HYDROcodone-acetaminophen 5-325 MG per tablet    NORCO    60 tablet    Take 2 tablets by mouth every 6 hours as needed for moderate to severe pain    Brain tumor (H)       hydrOXYzine 25 MG tablet    ATARAX    180 tablet    Take 1 tablet (25 mg) by mouth 2 times daily    Itching, Anxiety       lactulose 10 GM/15ML solution    CHRONULAC    473 mL    Take 7.5 mLs (5 g) by mouth 2 times daily    Hepatic encephalopathy (H)       levothyroxine 88 MCG tablet    SYNTHROID/LEVOTHROID    90 tablet    Take 1 tablet (88 mcg) by mouth daily    Hypothyroidism       lidocaine 4 % Crea cream    LMX4    133 g    Apply topically once as needed  for mild pain    Port catheter in place       methylphenidate 10 MG tablet    RITALIN    60 tablet    10mg twice a day    Major depressive disorder with single episode, in partial remission (H)       mirtazapine 45 MG tablet    REMERON    30 tablet    Take 1 tablet (45 mg) by mouth At Bedtime    Major depressive disorder with single episode, in partial remission (H)       multivitamin, therapeutic with minerals Tabs tablet      Take 1 tablet by mouth 2 times daily        mupirocin 2 % ointment    BACTROBAN    22 g    Use in nose 2-3 times per week    Epistaxis       omeprazole 20 MG CR capsule    priLOSEC    360 capsule    Take 2 capsules (40 mg) by mouth 2 times daily    Gastroesophageal reflux disease without esophagitis       ONETOUCH LANCETS Misc     100 each    by In Vitro route 4 times daily as needed    Type II or unspecified type diabetes mellitus without mention of complication, not stated as uncontrolled       phenytoin 30 MG CR capsule    DILANTIN    720 capsule    Take 4 capsules (120 mg) by mouth 2 times daily    Oligoastrocytoma of parietal lobe (H)       pravastatin 80 MG tablet    PRAVACHOL    90 tablet    Take 1 tablet (80 mg) by mouth daily    High cholesterol       simethicone 80 MG chewable tablet    MYLICON    100 tablet    Take 1 tablet (80 mg) by mouth every 6 hours as needed for cramping    Abdominal cramping       spironolactone 25 MG tablet    ALDACTONE    180 tablet    Take 2 tablets (50 mg) by mouth daily    Other ascites       sulfamethoxazole-trimethoprim 800-160 MG per tablet    BACTRIM DS/SEPTRA DS    30 tablet    Take 1 tablet by mouth daily    Spontaneous bacterial peritonitis (H)       thiamine 100 MG tablet     100 tablet    Take 1 tablet (100 mg) by mouth daily    Alcoholic cirrhosis of liver with ascites (H)       traZODone 50 MG tablet    DESYREL    30 tablet    Take 1 tablet (50 mg) by mouth nightly as needed for sleep    Primary insomnia       XIFAXAN 550 MG Tabs tablet    Generic drug:  rifaximin     60 tablet    TAKE ONE TABLET BY MOUTH TWICE DAILY    Hepatic encephalopathy (H)

## 2018-05-25 NOTE — PROGRESS NOTES
[facility] [place]   King Louis MD  05/25/2018     Chief Complaint:   Pre-Op Exam        History of Present Illness:   Mono Varghese is 47 year old male here with his wife at the request of Dr. Kelly for cardiovascular, pulmonary, and perioperative risk assessment prior to surgery. The intended surgical procedure is a combined EGD with variceal banding on 05/30/2018.  A copy of this note will be sent to the surgeon.     Reason for surgery: The patient has a past medical history significant for esophageal varices. He has had a number of EGDs completed in the past to monitor and evaluate this condition. The patient more recently reports having a dry mouth and states that he is having intermittent trouble swallowing. He expresses concern for growing varices.No GI bleed sx.    Cardiovascular Risk:  He does have a history of hypertension and high cholesterol. There is not a history of stroke or valvular disease.    Pulmonary Risk:  The patient does have any history of lung problems.     Perioperative Complications:  The patient does have a history of portal vein thrombosis and obstructive sleep apnea in the past. He also has past complications with epistaxis, however he is no longer dealing with this concern. He has not had complications from past surgeries. The patient does not know his family history, therefore he is unaware of a family history of any anesthesia or surgical complications.     Other:   Blood Glucose-  The patient states that he checks his blood glucose once a day, but he was recommended by his pharmacist to be checking it twice a day.The patient's wife also notes that his A1C has been low recently. This was found to be 5.2% on 04/11/2018. The patient reports that his blood glucose is was often <100 in the morning, therefore he discontinued the use of Glipizide. Since, his morning glucose levels have been less frequently <100. The patient states that when he last checked,  his glucose level was 120. Of note, the patients Trulicity dosage was increased in early spring of 2017.    Weight loss: Since January, the patient has lost around 20-30 lbs. Generally, he has experienced a loss of appetite and notes that he does not eat when he is not hungry. Occasionally, he does snack on oranges, nuts, and peanut butter toast but is unable to complete a full portion size meal in one sitting like he used to be able to do. He denies nausea, vomiting and diarrhea, but does experience slight constipation, which he is treating with Lactulose.     Falling- About two weeks ago, the patient fell 3-4 times within a 3-4 day period. Since these episodes, he has not fallen. The patient's wife reports that one of his falls was mechanical and he experienced some bruising to his legs with skin lesions.      Paracentesis- The patient has had numerous paracenteses completed for his ascites, nearly every week beginning in early February. Recently, these procedures have decreased in frequency as he has been taking Lasix to manage his excess fluid build up. The patient is concerned about continuing abdominal procedures as he dislikes the needle aspiration.      Medications - His insurance will not currently cover a consultation with the pharmacy to review his current medication list.     Health Care Maintenance/Other:  1. Memory testing- with neurology on 09/04/2018    Review of Systems:   Pertinent items are noted in HPI, remainder of complete ROS is negative.      Active Medications:      Calcium Carb-Cholecalciferol (CALCIUM 500 +D) 500-400 MG-UNIT TABS, Take 500 mg by mouth daily, Disp: 90 tablet, Rfl: 1     ciclopirox (LOPROX) 0.77 % cream, Apply topically 2 times daily To feet and toenails., Disp: 90 g, Rfl: 4     cyanocobalamin (VITAMIN  B-12) 1000 MCG tablet, 1 tablet (1,000 mcg) by Per G Tube route daily (Patient taking differently: 1,000 mcg by Per G Tube route every morning ), Disp: 130 tablet, Rfl: 2      dulaglutide (TRULICITY) 1.5 MG/0.5ML pen, Inject 1.5 mg Subcutaneous every 7 days, Disp: 6 mL, Rfl: 0     ferrous sulfate (IRON) 325 (65 FE) MG tablet, Take 1 tablet (325 mg) by mouth 2 times daily, Disp: 200 tablet, Rfl: 3     furosemide (LASIX) 20 MG tablet, Take 1 tablet (20 mg) by mouth daily, Disp: 30 tablet, Rfl: 3     gabapentin (NEURONTIN) 100 MG capsule, Take 1 capsule (100 mg) by mouth At Bedtime, Disp: 30 capsule, Rfl: 3     HYDROcodone-acetaminophen (NORCO) 5-325 MG per tablet, Take 2 tablets by mouth every 6 hours as needed for moderate to severe pain, Disp: 60 tablet, Rfl: 0     hydrOXYzine (ATARAX) 25 MG tablet, Take 1 tablet (25 mg) by mouth 2 times daily, Disp: 180 tablet, Rfl: 0     lactulose (CHRONULAC) 10 GM/15ML solution, Take 7.5 mLs (5 g) by mouth 2 times daily, Disp: 473 mL, Rfl: 3     levothyroxine (SYNTHROID/LEVOTHROID) 88 MCG tablet, Take 1 tablet (88 mcg) by mouth daily, Disp: 90 tablet, Rfl: 1     lidocaine (LMX4) 4 % CREA cream, Apply topically once as needed for mild pain, Disp: 133 g, Rfl: 1     methylphenidate (RITALIN) 10 MG tablet, 10mg twice a day, Disp: 60 tablet, Rfl: 0     mirtazapine (REMERON) 45 MG tablet, Take 1 tablet (45 mg) by mouth At Bedtime, Disp: 30 tablet, Rfl: 3     multivitamin, therapeutic with minerals (THERA-VIT-M) TABS, Take 1 tablet by mouth 2 times daily, Disp: , Rfl:      mupirocin (BACTROBAN) 2 % ointment, Use in nose 2-3 times per week, Disp: 22 g, Rfl: 0     omeprazole (PRILOSEC) 20 MG CR capsule, Take 2 capsules (40 mg) by mouth 2 times daily, Disp: 360 capsule, Rfl: 3     phenytoin (DILANTIN) 30 MG CR capsule, Take 4 capsules (120 mg) by mouth 2 times daily, Disp: 720 capsule, Rfl: 11     pravastatin (PRAVACHOL) 80 MG tablet, Take 1 tablet (80 mg) by mouth daily, Disp: 90 tablet, Rfl: 3     simethicone (MYLICON) 80 MG chewable tablet, Take 1 tablet (80 mg) by mouth every 6 hours as needed for cramping, Disp: 100 tablet, Rfl: 1     spironolactone  (ALDACTONE) 25 MG tablet, Take 2 tablets (50 mg) by mouth daily, Disp: 180 tablet, Rfl: 1     sulfamethoxazole-trimethoprim (BACTRIM DS/SEPTRA DS) 800-160 MG per tablet, Take 1 tablet by mouth daily, Disp: 30 tablet, Rfl: 5     thiamine (VITAMIN B-1) 100 MG tablet, Take 1 tablet (100 mg) by mouth daily, Disp: 100 tablet, Rfl: 1     traZODone (DESYREL) 50 MG tablet, Take 1 tablet (50 mg) by mouth nightly as needed for sleep, Disp: 30 tablet, Rfl: 3     XIFAXAN 550 MG TABS tablet, TAKE ONE TABLET BY MOUTH TWICE DAILY, Disp: 60 tablet, Rfl: 11     Allergies:   Tegaderm transparent dressing  Latex      Past Medical History:  Ascites due to alcoholic cirrhosis   Chronic pain   Depressive disorder   Hepatic encephalopathy  Gastroesophageal reflux disease (GERD)   Gastrointestinal (GI) bleeding   Oligoastrocytoma of parietal lobe  Obstructive sleep apnea (LENA), moderate   Hyperlipidemia LDL goal <100   Hypothyroidism   Liver failure   Moderate major depression   Obesity   Pancytopenia   Partial epilepsy with impairment of consciousness    Portal hypertensive gastropathy   Portal deep vein thrombosis   Pulmonary nodules   Rectal bleeding   Type 2 diabetes mellitus  ADHD (attention deficit hyperactivity disorder)  Financial difficulties  Thrombocytopenia   Myopic astigmatism of both eyes  Presbyopia  Ringing in ears, unspecified laterality  Advance care planning     Past Surgical History:  Esophagoscopy, gastroscopy, duodenoscopy (EDG), combined (multiple)  Optical tracking system craniotomy, excise tumor, combined   Right hand surgery   Flexible sigmoidoscopy     Family History:   Cirrhosis     The patient's family history is limited as he is adopted.     Social History:   The patient is  with one child, a nonsmoker, and does not consume alcohol as he quit in 2014.      Physical Exam:   /71  Pulse 77  Resp 16  Wt 86 kg (189 lb 11.2 oz)  SpO2 96%  BMI 22.15 kg/m2     Constitutional: Alert. In no  distress.  Head: Normocephalic. No masses, lesions, or abnormalities.  Cardiovascular: RRR. No murmurs, clicks, gallops, or rub.  Respiratory: Clear to auscultation bilaterally, no wheezes or crackles.  Gastrointestinal: Abdomen soft, distended. Non-tender. BS normal. No masses or organomegaly.  Musculoskeletal: Lower extremity edema. No gross deformities noted. Normal muscle tone.  Skin: Abrasion of left knee, does not appear infected.  Psychiatric: Mentation appears normal. Normal affect.     Recent Labs:  Component      Latest Ref Rng & Units 5/25/2018   Sodium      133 - 144 mmol/L 141   Potassium      3.4 - 5.3 mmol/L 3.8   Chloride      94 - 109 mmol/L 109   Carbon Dioxide      20 - 32 mmol/L 25   Anion Gap      3 - 14 mmol/L 6   Glucose      70 - 99 mg/dL 110 (H)   Urea Nitrogen      7 - 30 mg/dL 13   Creatinine      0.66 - 1.25 mg/dL 0.92   GFR Estimate      >60 mL/min/1.7m2 88   GFR Estimate If Black      >60 mL/min/1.7m2 >90   Calcium      8.5 - 10.1 mg/dL 9.0   WBC      4.0 - 11.0 10e9/L 2.4 (L)   RBC Count      4.4 - 5.9 10e12/L 3.97 (L)   Hemoglobin      13.3 - 17.7 g/dL 13.0 (L)   Hematocrit      40.0 - 53.0 % 37.5 (L)   MCV      78 - 100 fl 95   MCH      26.5 - 33.0 pg 32.7   MCHC      31.5 - 36.5 g/dL 34.7   RDW      10.0 - 15.0 % 14.9   Platelet Count      150 - 450 10e9/L 45 (LL)   INR      0.86 - 1.14 1.52 (H)   Ammonia      10 - 50 umol/L 54 (H)       EKG:  EKG was not indicated based on risk assessment.      Indication:  Pre-op  Date:  02/09/2018  Time:   12:29  Impression: Sinus rhythm    Normal ECG    When compared with ECG of 11-FEB-2016 12:40,    No significant change was found    Unconfirmed report - this is a computer generated report only - please see the primary care center for final interpretation   Vent. Rate: 72  R-axis:  -2     Assessment and Plan:   1. Pre-operative assessment for combined EGD  - CBC with platelets  - Basic metabolic panel  - INR    The patient is at low risk for  cardiovascular complications and at LOW risk for pulmonary complications of this low risk surgery.    --Approval given to proceed with proposed procedure, without further diagnostic evaluation    The patient has been told to not take any aspirin or NSAIDs for 10 days prior to surgery. The patient has been instructed as to what to do with medications prior to surgery.    2. Esophageal varices  Patient has a long standing history of varices and has undergone multiple EGDs. Recently, patient has experienced difficulty swallowing and dry mouth. This can further be explored during his upcoming EGD on May 30th.     3. Memory  Patient has an upcoming appointment scheduled with neurology for memory testing. He can follow up with me a few weeks after completion to review the results.     Follow-up: Return to clinic in one to two months (a few weeks after completing memory testing) to discuss neurology testing results.     I did review w/ his GI MD his abnl INR/plt so they are aware in advance        Scribe Disclosure:   We, Helene Sandoval and Hilda Morley, are serving as the scribes to document services personally performed by King Louis MD at this visit, based upon the provider's statements to me. All documentation has been reviewed by the aforementioned provider prior to being entered into the official medical record.     Portions of this medical record were completed by a scribe. UPON MY REVIEW AND AUTHENTICATION BY ELECTRONIC SIGNATURE, this confirms (a) I performed the applicable clinical services, and (b) the record is accurate.  King Louis MD

## 2018-05-25 NOTE — PATIENT INSTRUCTIONS
[facility]: [phone]     [facility] Medication Refill Request Information:  * Please contact your pharmacy regarding ANY request for medication refills.  ** [facility] Fax = [phone]  * Please allow 3 business days for routine medication refills.  * Please allow 5 business days for controlled substance medication refills.     [facility] Test Result notification information:  *You will be notified with in 7-10 days of your appointment day regarding the results of your test.  If you are on MyChart you will be notified as soon as the provider has reviewed the results and signed off on them.

## 2018-05-25 NOTE — NURSING NOTE
Chief Complaint   Patient presents with     Pre-Op Exam     Pt is here for a pre-op. In Lucien Ramírez LPN at 12:29 PM on 5/25/2018.

## 2018-05-29 NOTE — PROGRESS NOTES
The patient was seen for neuropsychological evaluation at the request of Beatriz Tanner MD for the purposes of diagnostic clarification and treatment planning.  2 hours of face-to-face testing were provided by this writer.  Please see Dr. Marie Babb's report for a full interpretation of the findings.

## 2018-05-29 NOTE — MR AVS SNAPSHOT
After Visit Summary   5/29/2018    Mono Varghsee    MRN: [id number]           Patient Information     Date Of Birth          1970        Visit Information        Provider Department      5/29/2018 12:30 PM Marie Babb PsyD [facility] Neuro[facility]ology        Today's Diagnoses     Hepatic encephalopathy (H)    -  1    Oligoastrocytoma of parietal lobe (H)           Follow-ups after your visit        Your next 10 appointments already scheduled     Jun 22, 2018 12:35 PM CDT   (Arrive by 12:20 PM)   Return Visit with King Louis MD   [facility] ([facility])    [address] [place] [address]  4th Floor  [facility] [address]   [phone]            Aug 01, 2018  7:30 AM CDT   Lab with  LAB   [facility] ([facility])    [address] [place] [address]  1st Floor  [facility] [address]   [phone]            Aug 01, 2018  8:30 AM CDT   (Arrive by 8:15 AM)   Return General Liver with Beatriz Tanner MD   [facility] Hepatology ([facility])    [address] [place] [address]  [address]  [facility] [address]   [phone]            Sep 04, 2018 12:30 PM CDT   (Arrive by 12:15 PM)   Return Seizure with Anil English MD   [facility] Neurology ([facility])    [address] [place] [address]  3rd Floor  [facility] [address]   [phone]            Apr 26, 2019 10:00 AM CDT   MR BRAIN W/O & W CONTRAST with [facility] ([facility])    [address] [place] [address]  [address] [facility] [address]   [phone]           Take your medicines as usual, unless your doctor tells you not to. Bring a list of your current medicines to your exam (including vitamins, minerals and over-the-counter drugs).  You may or may not receive intravenous (IV) contrast for this exam pending the discretion of the Radiologist.  You do not need to do anything special  to prepare.  The MRI machine uses a strong magnet. Please wear clothes without metal (snaps, zippers). A sweatsuit works well, or we may give you a hospital gown.  Please remove any body piercings and hair extensions before you arrive. You will also remove watches, jewelry, hairpins, wallets, dentures, partial dental plates and hearing aids. You may wear contact lenses, and you may be able to wear your rings. We have a safe place to keep your personal items, but it is safer to leave them at home.  **IMPORTANT** THE INSTRUCTIONS BELOW ARE ONLY FOR THOSE PATIENTS WHO HAVE BEEN PRESCRIBED SEDATION OR GENERAL ANESTHESIA DURING THEIR MRI PROCEDURE:  IF YOUR DOCTOR PRESCRIBED ORAL SEDATION (take medicine to help you relax during your exam):   You must get the medicine from your doctor (oral medication) before you arrive. Bring the medicine to the exam. Do not take it at home. You ll be told when to take it upon arriving for your exam.   Arrive one hour early. Bring someone who can take you home after the test. Your medicine will make you sleepy. After the exam, you may not drive, take a bus or take a taxi by yourself.  IF YOUR DOCTOR PRESCRIBED IV SEDATION:   Arrive one hour early. Bring someone who can take you home after the test. Your medicine will make you sleepy. After the exam, you may not drive, take a bus or take a taxi by yourself.   No eating 6 hours before your exam. You may have clear liquids up until 4 hours before your exam. (Clear liquids include water, clear tea, black coffee and fruit juice without pulp.)  IF YOUR DOCTOR PRESCRIBED ANESTHESIA (be asleep for your exam):   Arrive 1 1/2 hours early. Bring someone who can take you home after the test. You may not drive, take a bus or take a taxi by yourself.   No eating 8 hours before your exam. You may have clear liquids up until 4 hours before your exam. (Clear liquids include water, clear tea, black coffee and fruit juice without pulp.)   You will spend  four to five hours in the recovery room.  Please call the Imaging Department at your exam site with any questions.            May 03, 2019 10:45 AM CDT   (Arrive by 10:30 AM)   Return Visit with Lauro Shaw MD   [facility] ([facility] [place])    [address]  [address]  [facility] [address] [phone]              Who to contact     Please call your clinic at [phone] to:    Ask questions about your health    Make or cancel appointments    Discuss your medicines    Learn about your test results    Speak to your doctor            Additional Information About Your Visit        [employer] Information     [employer] gives you secure access to your electronic health record. If you see a primary care provider, you can also send messages to your care team and make appointments. If you have questions, please call your primary care clinic.  If you do not have a primary care provider, please call [phone] and they will assist you.      [employer] is an electronic gateway that provides easy, online access to your medical records. With [employer], you can request a clinic appointment, read your test results, renew a prescription or communicate with your care team.     To access your existing account, please contact your [facility] or call [phone] for assistance.        Care EveryWhere ID     This is your Care EveryWhere ID. This could be used by other organizations to access your [place] medical records  [id number]         Blood Pressure from Last 3 Encounters:   05/30/18 (!) 133/104   05/25/18 122/71   05/04/18 123/76    Weight from Last 3 Encounters:   05/30/18 88.6 kg (195 lb 5.2 oz)   05/25/18 86 kg (189 lb 11.2 oz)   05/04/18 88.1 kg (194 lb 3.2 oz)              We Performed the Following     68190-[id number] TESTING, PER HR/PSYCHOLOGIST     NEUROPSYCH TESTING BY TECH          Today's Medication Changes          These changes are  accurate as of 5/29/18 11:59 PM.  If you have any questions, ask your nurse or doctor.               These medicines have changed or have updated prescriptions.        Dose/Directions    cyanocobalamin 1000 MCG tablet   Commonly known as:  vitamin  B-12   This may have changed:  when to take this   Used for:  Alcoholic cirrhosis of liver with ascites (H)        Dose:  1000 mcg   1 tablet (1,000 mcg) by Per G Tube route daily   Quantity:  130 tablet   Refills:  2                Primary Care Provider Office Phone # Fax #    King Louis [phone]       [address] [address]        Equal Access to Services     [facility] : Hadii kevin mendoza hadbill Thomas, waaxda lubharatadaha, qaybta kaalmada kamla, josue roque . So [facility] [phone].    ATENCIÓN: Si habla español, tiene a chiang disposición servicios gratuitos de asistencia lingüística. [facility] [phone].    We comply with applicable federal civil rights laws and [place] laws. We do not discriminate on the basis of race, color, national origin, age, disability, sex, sexual orientation, or gender identity.            Thank you!     Thank you for choosing [facility] NEUROP[facility]  for your care. Our goal is always to provide you with excellent care. Hearing back from our patients is one way we can continue to improve our services. Please take a few minutes to complete the written survey that you may receive in the mail after your visit with us. Thank you!             Your Updated Medication List - Protect others around you: Learn how to safely use, store and throw away your medicines at www.disposemymeds.org.          This list is accurate as of 5/29/18 11:59 PM.  Always use your most recent med list.                   Brand Name Dispense Instructions for use Diagnosis    blood glucose monitoring test strip    ONETOUCH ULTRA    400 each    Use to test blood sugars 4 times daily as needed or as  directed.    Diabetes mellitus, type 2 (H)       Calcium Carb-Cholecalciferol 500-400 MG-UNIT Tabs    calcium 500 +D    90 tablet    Take 500 mg by mouth daily    Malnutrition (H)       CANE, ANY MATERIAL     1 each    One cane    Balance disorder       ciclopirox 0.77 % cream    LOPROX    90 g    Apply topically 2 times daily To feet and toenails.    Tinea pedis of both feet       cyanocobalamin 1000 MCG tablet    vitamin  B-12    130 tablet    1 tablet (1,000 mcg) by Per G Tube route daily    Alcoholic cirrhosis of liver with ascites (H)       dulaglutide 1.5 MG/0.5ML pen    TRULICITY    6 mL    Inject 1.5 mg Subcutaneous every 7 days    DM type 2, goal HbA1c 7%-8% (H)       ferrous sulfate 325 (65 Fe) MG tablet    IRON    200 tablet    Take 1 tablet (325 mg) by mouth 2 times daily    Other iron deficiency anemia       furosemide 20 MG tablet    LASIX    30 tablet    Take 1 tablet (20 mg) by mouth daily    Other ascites       gabapentin 100 MG capsule    NEURONTIN    30 capsule    Take 1 capsule (100 mg) by mouth At Bedtime    Mild episode of recurrent major depressive disorder (H)       HYDROcodone-acetaminophen 5-325 MG per tablet    NORCO    60 tablet    Take 2 tablets by mouth every 6 hours as needed for moderate to severe pain    Brain tumor (H)       hydrOXYzine 25 MG tablet    ATARAX    180 tablet    Take 1 tablet (25 mg) by mouth 2 times daily    Itching, Anxiety       levothyroxine 88 MCG tablet    SYNTHROID/LEVOTHROID    90 tablet    Take 1 tablet (88 mcg) by mouth daily    Hypothyroidism       lidocaine 4 % Crea cream    LMX4    133 g    Apply topically once as needed for mild pain    Port catheter in place       methylphenidate 10 MG tablet    RITALIN    60 tablet    10mg twice a day    Major depressive disorder with single episode, in partial remission (H)       mirtazapine 45 MG tablet    REMERON    30 tablet    Take 1 tablet (45 mg) by mouth At Bedtime    Major depressive disorder with single  episode, in partial remission (H)       multivitamin, therapeutic with minerals Tabs tablet      Take 1 tablet by mouth 2 times daily        mupirocin 2 % ointment    BACTROBAN    22 g    Use in nose 2-3 times per week    Epistaxis       omeprazole 20 MG CR capsule    priLOSEC    360 capsule    Take 2 capsules (40 mg) by mouth 2 times daily    Gastroesophageal reflux disease without esophagitis       ONETOUCH LANCETS Misc     100 each    by In Vitro route 4 times daily as needed    Type II or unspecified type diabetes mellitus without mention of complication, not stated as uncontrolled       phenytoin 30 MG CR capsule    DILANTIN    720 capsule    Take 4 capsules (120 mg) by mouth 2 times daily    Oligoastrocytoma of parietal lobe (H)       pravastatin 80 MG tablet    PRAVACHOL    90 tablet    Take 1 tablet (80 mg) by mouth daily    High cholesterol       simethicone 80 MG chewable tablet    MYLICON    100 tablet    Take 1 tablet (80 mg) by mouth every 6 hours as needed for cramping    Abdominal cramping       spironolactone 25 MG tablet    ALDACTONE    180 tablet    Take 2 tablets (50 mg) by mouth daily    Other ascites       sulfamethoxazole-trimethoprim 800-160 MG per tablet    BACTRIM DS/SEPTRA DS    30 tablet    Take 1 tablet by mouth daily    Spontaneous bacterial peritonitis (H)       thiamine 100 MG tablet     100 tablet    Take 1 tablet (100 mg) by mouth daily    Alcoholic cirrhosis of liver with ascites (H)       traZODone 50 MG tablet    DESYREL    30 tablet    Take 1 tablet (50 mg) by mouth nightly as needed for sleep    Primary insomnia       XIFAXAN 550 MG Tabs tablet   Generic drug:  rifaximin     60 tablet    TAKE ONE TABLET BY MOUTH TWICE DAILY    Hepatic encephalopathy (H)

## 2018-05-30 NOTE — OR NURSING
Dr Alejandro at bedside, ok with patient discharge, Orders for heparin lock to dc port a cath. Discharge instructions complete, discharge by wheelchair

## 2018-05-30 NOTE — IP AVS SNAPSHOT
MRN:[id number]                      After Visit Summary   5/30/2018    Mono Varghese    MRN: [id number]           Thank you!     Thank you for choosing [facility] for your care. Our goal is always to provide you with excellent care. Hearing back from our patients is one way we can continue to improve our services. Please take a few minutes to complete the written survey that you may receive in the mail after you visit with us. Thank you!        Patient Information     Date Of Birth          1970        About your hospital stay     You were admitted on:  May 30, 2018 You last received care in the:  Same Day Surgery [facility]    You were discharged on:  May 30, 2018       Who to Call     For medical emergencies, please call 911.  For non-urgent questions about your medical care, please call your primary care provider or clinic, [phone]  For questions related to your surgery, please call your surgery clinic        Attending Provider     Provider Specialty    Guru Jose Kelly MD Gastroenterology       Primary Care Provider Office Phone # Fax #    [place] [phone]      After Care Instructions     Discharge Instructions       Resume pre procedure diet            Discharge Instructions       Restart home medications.                  Your next 10 appointments already scheduled     Jun 22, 2018 12:35 PM CDT   (Arrive by 12:20 PM)   Return Visit with [place] MD   [facility] ([facility])    [address] [place] [address] [phone]            Aug 01, 2018  7:30 AM CDT   Lab with  LAB   [facility] ([facility])    [address] [place] [id number] [phone]            Aug 01, 2018  8:30 AM CDT   (Arrive by 8:15 AM)   Return General Liver with Beatriz Tanner MD   [facility] (  [facility])    [address]  [address]  [facility] [address]   [phone]            Sep 04, 2018 12:30 PM CDT   (Arrive by 12:15 PM)   Return Seizure with Anil English MD   [facility] ([place] [facility])    [address]  3rd Floor  [facility] [address]   [phone]            Apr 26, 2019 10:00 AM CDT   MR BRAIN W/O & W CONTRAST with UC1   [facility] ([place] [facility])    [address]  1st Floor  [facility] [address]   [phone]           Take your medicines as usual, unless your doctor tells you not to. Bring a list of your current medicines to your exam (including vitamins, minerals and over-the-counter drugs).  You may or may not receive intravenous (IV) contrast for this exam pending the discretion of the Radiologist.  You do not need to do anything special to prepare.  The MRI machine uses a strong magnet. Please wear clothes without metal (snaps, zippers). A sweatsuit works well, or we may give you a hospital gown.  Please remove any body piercings and hair extensions before you arrive. You will also remove watches, jewelry, hairpins, wallets, dentures, partial dental plates and hearing aids. You may wear contact lenses, and you may be able to wear your rings. We have a safe place to keep your personal items, but it is safer to leave them at home.  **IMPORTANT** THE INSTRUCTIONS BELOW ARE ONLY FOR THOSE PATIENTS WHO HAVE BEEN PRESCRIBED SEDATION OR GENERAL ANESTHESIA DURING THEIR MRI PROCEDURE:  IF YOUR DOCTOR PRESCRIBED ORAL SEDATION (take medicine to help you relax during your exam):   You must get the medicine from your doctor (oral medication) before you arrive. Bring the medicine to the exam. Do not take it at home. You ll be told when to take it upon arriving for your exam.   Arrive one hour early. Bring someone who can take you home after the test. Your medicine will  make you sleepy. After the exam, you may not drive, take a bus or take a taxi by yourself.  IF YOUR DOCTOR PRESCRIBED IV SEDATION:   Arrive one hour early. Bring someone who can take you home after the test. Your medicine will make you sleepy. After the exam, you may not drive, take a bus or take a taxi by yourself.   No eating 6 hours before your exam. You may have clear liquids up until 4 hours before your exam. (Clear liquids include water, clear tea, black coffee and fruit juice without pulp.)  IF YOUR DOCTOR PRESCRIBED ANESTHESIA (be asleep for your exam):   Arrive 1 1/2 hours early. Bring someone who can take you home after the test. You may not drive, take a bus or take a taxi by yourself.   No eating 8 hours before your exam. You may have clear liquids up until 4 hours before your exam. (Clear liquids include water, clear tea, black coffee and fruit juice without pulp.)   You will spend four to five hours in the recovery room.  Please call the Imaging Department at your exam site with any questions.            May 03, 2019 10:45 AM CDT   (Arrive by 10:30 AM)   Return Visit with Lauro Shaw MD   [facility] ([facility])    [address]  [address] [place] [address] [phone]              Further instructions from your care team       [facility]  Same-Day Surgery   Adult Discharge Orders & Instructions     For 24 hours after surgery    1. Get plenty of rest.  A responsible adult must stay with you for at least 24 hours after you leave the hospital.   2. Do not drive or use heavy equipment.  If you have weakness or tingling, don't drive or use heavy equipment until this feeling goes away.  3. Do not drink alcohol.  4. Avoid strenuous or risky activities.  Ask for help when climbing stairs.   5. You may feel lightheaded.  IF so, sit for a few minutes before standing.  Have someone help you get up.   6. If you  "have nausea (feel sick to your stomach): Drink only clear liquids such as apple juice, ginger ale, broth or 7-Up.  Rest may also help.  Be sure to drink enough fluids.  Move to a regular diet as you feel able.  7. You may have a slight fever. Call the doctor if your fever is over 100 F (37.7 C) (taken under the tongue) or lasts longer than 24 hours.  8. You may have a dry mouth, a sore throat, muscle aches or trouble sleeping.  These should go away after 24 hours.  9. Do not make important or legal decisions.   Call your doctor for any of the followin.  Signs of infection (fever, growing tenderness at the surgery site, a large amount of drainage or bleeding, severe pain, foul-smelling drainage, redness, swelling).    2. It has been over 8 to 10 hours since surgery and you are still not able to urinate (pass water).    3.  Headache for over 24 hours.    To contact a doctor, call Dr. Guru Kelly @ [phone] or:      [phone] and ask for the resident on call for Gastroenterology (answered 24 hours a day)      Emergency Department:  [facility]: [phone]       (TTY for hearing impaired: [phone])        Pending Results     No orders found from 2018 to 2018.            Admission Information     Date & Time Provider Department Dept. Phone    2018 Guru Jose Kelly MD Same Day Surgery [facility] [phone]      Your Vitals Were     Blood Pressure Pulse Temperature Respirations Height Weight    139/95 (Cuff Size: Adult Regular) 68 97.7  F (36.5  C) (Oral) 20 1.778 m (5' 10\") 88.6 kg (195 lb 5.2 oz)    Pulse Oximetry BMI (Body Mass Index)                98% 28.03 kg/m2          MyChart Information     [employer] gives you secure access to your electronic health record. If you see a primary care provider, you can also send messages to your care team and make appointments. If you have questions, please call your primary care clinic.  If you do not have a " primary care provider, please call [phone] and they will assist you.        Care EveryWhere ID     This is your Care EveryWhere ID. This could be used by other organizations to access your [place] medical records  [id number]        Equal Access to Services     SAMUEL FAIR : Jennifer mendoza almaz Thomas, waaniada luqadaha, qalimata kaalgoldy cason, josue sweta fangbrian piedra michelledenise tamayo. So [facility] [phone].    ATENCIÓN: Si habla español, tiene a chiang disposición servicios gratuitos de asistencia lingüística. Llame al [phone].    We comply with applicable federal civil rights laws and [place] laws. We do not discriminate on the basis of race, color, national origin, age, disability, sex, sexual orientation, or gender identity.               Review of your medicines      UNREVIEWED medicines. Ask your doctor about these medicines        Dose / Directions    Calcium Carb-Cholecalciferol 500-400 MG-UNIT Tabs   Commonly known as:  calcium 500 +D   Used for:  Malnutrition (H)        Dose:  500 mg   Take 500 mg by mouth daily   Quantity:  90 tablet   Refills:  1       ciclopirox 0.77 % cream   Commonly known as:  LOPROX   Used for:  Tinea pedis of both feet        Apply topically 2 times daily To feet and toenails.   Quantity:  90 g   Refills:  4       cyanocobalamin 1000 MCG tablet   Commonly known as:  vitamin  B-12   Used for:  Alcoholic cirrhosis of liver with ascites (H)        Dose:  1000 mcg   1 tablet (1,000 mcg) by Per G Tube route daily   Quantity:  130 tablet   Refills:  2       dulaglutide 1.5 MG/0.5ML pen   Commonly known as:  TRULICITY   Used for:  DM type 2, goal HbA1c 7%-8% (H)        Dose:  1.5 mg   Inject 1.5 mg Subcutaneous every 7 days   Quantity:  6 mL   Refills:  0       ferrous sulfate 325 (65 Fe) MG tablet   Commonly known as:  IRON   Used for:  Other iron deficiency anemia        Dose:  1 tablet   Take 1 tablet (325 mg) by mouth 2 times daily   Quantity:  200 tablet   Refills:  3        furosemide 20 MG tablet   Commonly known as:  LASIX   Used for:  Other ascites        Dose:  20 mg   Take 1 tablet (20 mg) by mouth daily   Quantity:  30 tablet   Refills:  3       gabapentin 100 MG capsule   Commonly known as:  NEURONTIN   Used for:  Mild episode of recurrent major depressive disorder (H)        Dose:  100 mg   Take 1 capsule (100 mg) by mouth At Bedtime   Quantity:  30 capsule   Refills:  3       HYDROcodone-acetaminophen 5-325 MG per tablet   Commonly known as:  NORCO   Used for:  Brain tumor (H)        Dose:  2 tablet   Take 2 tablets by mouth every 6 hours as needed for moderate to severe pain   Quantity:  60 tablet   Refills:  0       hydrOXYzine 25 MG tablet   Commonly known as:  ATARAX   Used for:  Itching, Anxiety        Dose:  25 mg   Take 1 tablet (25 mg) by mouth 2 times daily   Quantity:  180 tablet   Refills:  0       lactulose 10 GM/15ML solution   Commonly known as:  CHRONULAC   This may have changed:    - how much to take  - when to take this   Used for:  Hepatic encephalopathy (H)        Dose:  10 g   Take 15 mLs (10 g) by mouth daily   Quantity:  946 mL   Refills:  3       levothyroxine 88 MCG tablet   Commonly known as:  SYNTHROID/LEVOTHROID   Used for:  Hypothyroidism        Dose:  88 mcg   Take 1 tablet (88 mcg) by mouth daily   Quantity:  90 tablet   Refills:  1       lidocaine 4 % Crea cream   Commonly known as:  LMX4   Used for:  Port catheter in place        Apply topically once as needed for mild pain   Quantity:  133 g   Refills:  1       methylphenidate 10 MG tablet   Commonly known as:  RITALIN   Used for:  Major depressive disorder with single episode, in partial remission (H)        10mg twice a day   Quantity:  60 tablet   Refills:  0       mirtazapine 45 MG tablet   Commonly known as:  REMERON   Used for:  Major depressive disorder with single episode, in partial remission (H)        Dose:  45 mg   Take 1 tablet (45 mg) by mouth At Bedtime   Quantity:  30  tablet   Refills:  3       multivitamin, therapeutic with minerals Tabs tablet        Dose:  1 tablet   Take 1 tablet by mouth 2 times daily   Refills:  0       mupirocin 2 % ointment   Commonly known as:  BACTROBAN   Used for:  Epistaxis        Use in nose 2-3 times per week   Quantity:  22 g   Refills:  0       omeprazole 20 MG CR capsule   Commonly known as:  priLOSEC   Used for:  Gastroesophageal reflux disease without esophagitis        Dose:  40 mg   Take 2 capsules (40 mg) by mouth 2 times daily   Quantity:  360 capsule   Refills:  3       phenytoin 30 MG CR capsule   Commonly known as:  DILANTIN   Used for:  Oligoastrocytoma of parietal lobe (H)        Dose:  120 mg   Take 4 capsules (120 mg) by mouth 2 times daily   Quantity:  720 capsule   Refills:  11       pravastatin 80 MG tablet   Commonly known as:  PRAVACHOL   Used for:  High cholesterol        Dose:  80 mg   Take 1 tablet (80 mg) by mouth daily   Quantity:  90 tablet   Refills:  3       simethicone 80 MG chewable tablet   Commonly known as:  MYLICON   Used for:  Abdominal cramping        Dose:  80 mg   Take 1 tablet (80 mg) by mouth every 6 hours as needed for cramping   Quantity:  100 tablet   Refills:  1       spironolactone 25 MG tablet   Commonly known as:  ALDACTONE   Used for:  Other ascites        Dose:  50 mg   Take 2 tablets (50 mg) by mouth daily   Quantity:  180 tablet   Refills:  1       sulfamethoxazole-trimethoprim 800-160 MG per tablet   Commonly known as:  BACTRIM DS/SEPTRA DS   Used for:  Spontaneous bacterial peritonitis (H)        Dose:  1 tablet   Take 1 tablet by mouth daily   Quantity:  30 tablet   Refills:  5       thiamine 100 MG tablet   Used for:  Alcoholic cirrhosis of liver with ascites (H)        Dose:  100 mg   Take 1 tablet (100 mg) by mouth daily   Quantity:  100 tablet   Refills:  1       traZODone 50 MG tablet   Commonly known as:  DESYREL   Used for:  Primary insomnia        Dose:  50 mg   Take 1 tablet (50 mg) by  mouth nightly as needed for sleep   Quantity:  30 tablet   Refills:  3       XIFAXAN 550 MG Tabs tablet   Used for:  Hepatic encephalopathy (H)   Generic drug:  rifaximin        TAKE ONE TABLET BY MOUTH TWICE DAILY   Quantity:  60 tablet   Refills:  11         CONTINUE these medicines which have NOT CHANGED        Dose / Directions    blood glucose monitoring test strip   Commonly known as:  ONETOUCH ULTRA   Used for:  Diabetes mellitus, type 2 (H)        Use to test blood sugars 4 times daily as needed or as directed.   Quantity:  400 each   Refills:  1       CANE, ANY MATERIAL   Used for:  Balance disorder        One cane   Quantity:  1 each   Refills:  0       ONETOUCH LANCETS Misc   Used for:  Type II or unspecified type diabetes mellitus without mention of complication, not stated as uncontrolled        by In Vitro route 4 times daily as needed   Quantity:  100 each   Refills:  prn            Where to get your medicines      These medications were sent to [facility] [address] [place] [id number] [address] [address]     Phone:  [phone]     lactulose 10 GM/15ML solution                Protect others around you: Learn how to safely use, store and throw away your medicines at www.disposemymeds.org.             Medication List: This is a list of all your medications and when to take them. Check marks below indicate your daily home schedule. Keep this list as a reference.      Medications           Morning Afternoon Evening Bedtime As Needed    blood glucose monitoring test strip   Commonly known as:  ONETOUCH ULTRA   Use to test blood sugars 4 times daily as needed or as directed.                                Calcium Carb-Cholecalciferol 500-400 MG-UNIT Tabs   Commonly known as:  calcium 500 +D   Take 500 mg by mouth daily                                CANE, ANY MATERIAL   One cane                                ciclopirox 0.77 % cream   Commonly known as:  LOPROX   Apply  topically 2 times daily To feet and toenails.                                cyanocobalamin 1000 MCG tablet   Commonly known as:  vitamin  B-12   1 tablet (1,000 mcg) by Per G Tube route daily                                dulaglutide 1.5 MG/0.5ML pen   Commonly known as:  TRULICITY   Inject 1.5 mg Subcutaneous every 7 days                                ferrous sulfate 325 (65 Fe) MG tablet   Commonly known as:  IRON   Take 1 tablet (325 mg) by mouth 2 times daily                                furosemide 20 MG tablet   Commonly known as:  LASIX   Take 1 tablet (20 mg) by mouth daily                                gabapentin 100 MG capsule   Commonly known as:  NEURONTIN   Take 1 capsule (100 mg) by mouth At Bedtime                                HYDROcodone-acetaminophen 5-325 MG per tablet   Commonly known as:  NORCO   Take 2 tablets by mouth every 6 hours as needed for moderate to severe pain                                hydrOXYzine 25 MG tablet   Commonly known as:  ATARAX   Take 1 tablet (25 mg) by mouth 2 times daily                                lactulose 10 GM/15ML solution   Commonly known as:  CHRONULAC   Take 15 mLs (10 g) by mouth daily                                levothyroxine 88 MCG tablet   Commonly known as:  SYNTHROID/LEVOTHROID   Take 1 tablet (88 mcg) by mouth daily                                lidocaine 4 % Crea cream   Commonly known as:  LMX4   Apply topically once as needed for mild pain                                methylphenidate 10 MG tablet   Commonly known as:  RITALIN   10mg twice a day                                mirtazapine 45 MG tablet   Commonly known as:  REMERON   Take 1 tablet (45 mg) by mouth At Bedtime                                multivitamin, therapeutic with minerals Tabs tablet   Take 1 tablet by mouth 2 times daily                                mupirocin 2 % ointment   Commonly known as:  BACTROBAN   Use in nose 2-3 times per week                                 omeprazole 20 MG CR capsule   Commonly known as:  priLOSEC   Take 2 capsules (40 mg) by mouth 2 times daily                                ONETOUCH LANCETS Misc   by In Vitro route 4 times daily as needed                                phenytoin 30 MG CR capsule   Commonly known as:  DILANTIN   Take 4 capsules (120 mg) by mouth 2 times daily                                pravastatin 80 MG tablet   Commonly known as:  PRAVACHOL   Take 1 tablet (80 mg) by mouth daily                                simethicone 80 MG chewable tablet   Commonly known as:  MYLICON   Take 1 tablet (80 mg) by mouth every 6 hours as needed for cramping                                spironolactone 25 MG tablet   Commonly known as:  ALDACTONE   Take 2 tablets (50 mg) by mouth daily                                sulfamethoxazole-trimethoprim 800-160 MG per tablet   Commonly known as:  BACTRIM DS/SEPTRA DS   Take 1 tablet by mouth daily                                thiamine 100 MG tablet   Take 1 tablet (100 mg) by mouth daily                                traZODone 50 MG tablet   Commonly known as:  DESYREL   Take 1 tablet (50 mg) by mouth nightly as needed for sleep                                XIFAXAN 550 MG Tabs tablet   TAKE ONE TABLET BY MOUTH TWICE DAILY   Generic drug:  rifaximin

## 2018-05-30 NOTE — ANESTHESIA POSTPROCEDURE EVALUATION
Patient: Mono Varghese    Procedure(s):  upper endoscopy with banding of esophageal varices. *latex Allergy* - Wound Class: II-Clean Contaminated    Diagnosis:Bleeding Esophageal Varcies   Diagnosis Additional Information: No value filed.    Anesthesia Type:  General, ETT    Note:  Anesthesia Post Evaluation    Patient location during evaluation: PACU  Patient participation: Able to fully participate in evaluation  Level of consciousness: awake and alert  Pain management: adequate  Airway patency: patent  Cardiovascular status: acceptable  Respiratory status: acceptable  Hydration status: acceptable  PONV: none     Anesthetic complications: None          Last vitals:  Vitals:    05/30/18 0850 05/30/18 1053 05/30/18 1100   BP: 116/72 132/89 (!) 133/91   Pulse: 68     Resp: 18 18 20   Temp: 36.8  C (98.2  F) 35.8  C (96.4  F) 35.6  C (96.1  F)   SpO2: 99% 92% 95%         Electronically Signed By: Luis Sylvester MD  May 30, 2018  11:13 AM

## 2018-05-30 NOTE — ANESTHESIA PREPROCEDURE EVALUATION
Anesthesia Evaluation     . Pt has had prior anesthetic. Type: General and MAC    No history of anesthetic complications          ROS/MED HX    ENT/Pulmonary:     (+)uses CPAP , . Other pulmonary disease Persistent dry cough, more so when his ascites is building up.  Improved after paracentesis.  .   (-) tobacco use, sleep apnea and recent URI   Neurologic:     (+)seizures last seizure: Some time ago.  Well controlled with Dilantin.   features: Spacing out, difficulty sleeping, some shaking of the upper extremities.  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    Cardiovascular:     (+) Dyslipidemia, ----. : . . . :. .      (-) taking anticoagulants/antiplatelets, DOW and orthopnea/PND   METS/Exercise Tolerance:  4 - Raking leaves, gardening   Hematologic: Comments: History of portal vein thrombosis.      (+) History of blood clots pt is not anticoagulated, Anemia, History of Transfusion no previous transfusion reaction Other Hematologic Disorder-Thrombocytopenia,      Musculoskeletal:  - neg musculoskeletal ROS       GI/Hepatic: Comment: Alcohol related hepatic cirrhosis with history of hepatic encephalopathy; esophageal varices with bleeding; portal vein thrombosis; portal hypertensive gastropathy; recurrent ascites; and recurrent rectal bleeding.      (+) GERD liver disease, Other GI/Hepatic esophageal varices, Encephalopathy, hepatic       Renal/Genitourinary:  - ROS Renal section negative       Endo:     (+) type II DM date: 4/11/18 Using insulin - not using insulin pump Normal glucose range:  not previously admitted for DM/DKA thyroid problem hypothyroidism, Obesity, .   (-) Type I DM   Psychiatric:     (+) psychiatric history depression and other (comment) (ADHD)      Infectious Disease:  - neg infectious disease ROS       Malignancy:   (+) Malignancy History of Neuro  Neuro CA Remission status  post Surgery, Chemo and Radiation.          Other:    (+) H/O Chronic Pain,H/O chronic opiod use ,                                   Anesthesia Plan      History & Physical Review      ASA Status:  3 .        Plan for General and ETT with Intravenous and Propofol induction. Maintenance will be Balanced.    PONV prophylaxis:  Ondansetron (or other 5HT-3)       Postoperative Care  Postoperative pain management:  IV analgesics.      Consents  Anesthetic plan, risks, benefits and alternatives discussed with:  Patient.  Use of blood products discussed: No .   .                          .

## 2018-05-30 NOTE — DISCHARGE INSTRUCTIONS
[facility]  Same-Day Surgery   Adult Discharge Orders & Instructions     For 24 hours after surgery    1. Get plenty of rest.  A responsible adult must stay with you for at least 24 hours after you leave the hospital.   2. Do not drive or use heavy equipment.  If you have weakness or tingling, don't drive or use heavy equipment until this feeling goes away.  3. Do not drink alcohol.  4. Avoid strenuous or risky activities.  Ask for help when climbing stairs.   5. You may feel lightheaded.  IF so, sit for a few minutes before standing.  Have someone help you get up.   6. If you have nausea (feel sick to your stomach): Drink only clear liquids such as apple juice, ginger ale, broth or 7-Up.  Rest may also help.  Be sure to drink enough fluids.  Move to a regular diet as you feel able.  7. You may have a slight fever. Call the doctor if your fever is over 100 F (37.7 C) (taken under the tongue) or lasts longer than 24 hours.  8. You may have a dry mouth, a sore throat, muscle aches or trouble sleeping.  These should go away after 24 hours.  9. Do not make important or legal decisions.   Call your doctor for any of the followin.  Signs of infection (fever, growing tenderness at the surgery site, a large amount of drainage or bleeding, severe pain, foul-smelling drainage, redness, swelling).    2. It has been over 8 to 10 hours since surgery and you are still not able to urinate (pass water).    3.  Headache for over 24 hours.    To contact a doctor, call Dr. Guru Kelly @ [phone] or:      [phone] and ask for the resident on call for Gastroenterology (answered 24 hours a day)      Emergency Department:  [facility]: [phone]       (TTY for hearing impaired: [phone])

## 2018-05-30 NOTE — OR NURSING
Dr. Sylvester notified pt did not take seizure med this AM.  Pt has nurse that comes to set up medication and last dose for all meds was 5/29.

## 2018-05-30 NOTE — ANESTHESIA CARE TRANSFER NOTE
Patient: Mono Varghese    Procedure(s):  upper endoscopy with banding of esophageal varices. *latex Allergy* - Wound Class: II-Clean Contaminated    Diagnosis: Bleeding Esophageal Varcies   Diagnosis Additional Information: No value filed.    Anesthesia Type:   General, ETT     Note:  Airway :Face Mask  Patient transferred to:PACU  Comments: Spont resp, VSS, report to RNHandoff Report: Identifed the Patient, Identified the Reponsible Provider, Reviewed the pertinent medical history, Discussed the surgical course, Reviewed Intra-OP anesthesia mangement and issues during anesthesia, Set expectations for post-procedure period and Allowed opportunity for questions and acknowledgement of understanding      Vitals: (Last set prior to Anesthesia Care Transfer)    CRNA VITALS  5/30/2018 1023 - 5/30/2018 1053      5/30/2018             Pulse: 83    SpO2: 98 %                Electronically Signed By: TIESHA Ospina CRNA  May 30, 2018  10:53 AM

## 2018-05-30 NOTE — IP AVS SNAPSHOT
Same Day Surgery [address] [address]    Phone:  [phone]                                       After Visit Summary   5/30/2018    Mono Varghese    MRN: [id number]           After Visit Summary Signature Page     I have received my discharge instructions, and my questions have been answered. I have discussed any challenges I see with this plan with the nurse or doctor.    ..........................................................................................................................................  Patient/Patient Representative Signature      ..........................................................................................................................................  Patient Representative Print Name and Relationship to Patient    ..................................................               ................................................  Date                                            Time    ..........................................................................................................................................  Reviewed by Signature/Title    ...................................................              ..............................................  Date                                                            Time

## 2018-06-01 NOTE — PROGRESS NOTES
Post Upper Endo (5/30/2018) with Dr. Kelly: Follow-up    Post procedure recommendations:  - Observe patient in same day observation unit for ongoing care.   -Repeat upper endoscopy in 4-6 weeks in the OR (based on his comorbidities) for variceal surveillance. Will increase the surveillance periods after complete eradication of varices     Patient states: Faby states the patient is feeling good but tired. Patient denies any severe abdominal pain, nausea or emesis, fever or chills. Patient is tolerating a regular diet. Patient was instructed to call with any fever or chills, severe abdominal pain, nausea or emesis, or if he notices any jaundice color to his skin.     Orders placed: Upper Endo and sent to scheduling     Contact information verified for future questions/concerns. Patent's questions and concerns were addressed to their stated satisfaction. Patient is in agreement with this new plan of care.     Vanessa HORTON RN Coordinator  Dr. Kelly  Advanced Endoscopy  [phone]

## 2018-06-04 NOTE — PROGRESS NOTES
WECHSLER ADULT INTELLIGENCE SCALE-IV CONTROLLED WORD ASSOCIATION TEST        Age-Scaled Score Score     9    Vocabulary          6      Digit Span          1     TRAIL MAKING TEST   Block Design          2       Coding          1     Time Errors     A  141            1    WECHSLER MEMORY SCALES    B  dc d           -         Logical Mem Immediate   3/3  BOSTON NAMING TEST   Logical Mem 30 Minute   2/3     Visual Repr Immediate                  4    Score  44    Visual Repr 30                   1           30 Minute Recognition                  1            TEST OF SUSTAINED ATTENTION & TRACKING         Total Time          206       Total Errors   24

## 2018-06-04 NOTE — TELEPHONE ENCOUNTER
Faby is informed that Mono is scheduled on 07/11/18 at 950 AM with a check-in time of 7:50 AM.  Faby instructed that Mono will need to have another pre-op physical prior to this procedure.   His last one was done on 05/25/2018.   She says he is seeing his PCP on 06/22/2018, advised that if his health is the same, then his PCP should update his pre-op. Explained that we need to ensure that Mono I healthy before administering anesthesia.   Patient will be accompanied by his sister-in-law (Faby) and his wife will monitor him post procedure for at least 24 hours.  All instructions along with the pre-op physical form will be mailed to patient at his address listed in EPIC, it was confirmed during this call to be current.     SR 06/04/2018 @ 146 P

## 2018-06-04 NOTE — PROGRESS NOTES
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  He was started on Keppra and had the tumor resected in June of that year, subsequently receiving several months of rehabilitation for right arm weakness and speech difficulties.  Tumor treatment included Tremodar and radiation, the latter completed in August, 2013.  The most recent brain MRI, collected on 5/3/18, showed stable post-surgical changes in the left parietal craniotomy and underlying resection cavity with generalized parenchymal volume loss.  The findings were unchanged since at least 12/20/14, with no evidence of tumor recurrence/progression.  Unfortunately, he now has liver cirrhosis requiring regular paracentesis, compounding the tumor related cognitive deficits, prompting this neuropsychological re-evaluation, requested by Dr. Al Tanner, to help manage care.      Other health issues per Epic are hyperlipidemia, pancytopenia, GI bleed, pulmonary nodules, thrombocytopenia, obstructive sleep apnea, type II diabetes, hypothyroidism, and a seizure disorder.  The extensive medication regimen includes Neurontin, Remeron, methylphenidate, trazadone, phenytoin, and others.     Mr. Varghese is known to our service from a previous neuropsychological evaluation done on 2/6/14 by my colleague Dr. Rashmi Barr.  Please see her detailed report of that date for a full summary.  Briefly, she noted a long history of depression, sleep disturbance, and prior heavy alcohol consumption.  In school he received special education services for math and reading and was a C/D student, but managed to finish an Associate s  degree in welding.  At an early age he was diagnosed with ADHD.  He worked as a  while in the [employer], ultimately receiving a medical discharge.  Test results were abnormal.  Variable performance raised some concerns about effort consistency.  He worked extremely slowly throughout and admitted being severely depressed.  Most cognitive functions assessed were impaired including verbal and nonverbal reasoning abilities, processing speeds, working memory, verbal fluency, and short and long-term retention of verbal and figural material.  Dr. Barr thought multiple factors likely contributed to the findings, including late-effects of the known brain lesion, nonrestorative sleep, and ill-effects of depression.    Mr. Varghese lives with his wife of 17 years in [place].  Their only daughter is .  His wife works during the day.  Mr. Varghese has not been fully employed for many years for health reasons, and is supported by Social Security Disability but for the last few months has been working a few hours a week at [employer].      BEHAVIORAL OBSERVATIONS AND CLINICAL INTERVIEW FINDINGS:    He arrived 40 minutes early accompanied by his mother-in-law, Kassandra Alaniz, who was included in the interview and helpful in providing family perspective.  He presents as a middleaged man of average build except for a distended abdomen, with greying hair and full beard, wearing eyeglasses, dressed in a grey athletic short-sleeved shirt, black sweatpants, slip-on shoes, and a [employer] baseball cap.  His brown hair is thin, presumably from the radiation, and he removed his cap to show me his surgical scar.  He seemed fatigued and was generally sluggish and very slow to formulate thoughts.  Affect was slightly inappropriate as he was rather jocular.  Grooming was marginal, as his untrimmed beard encroached down his neck.  His nails were yellowing.      Fortunately, the brain tumor has been stable, with no treatment needed in  recent years.  But a couple of years ago, his wife found him unconscious on the floor in a pool of blood.  He was hospitalized, diagnosed with severe cirrhosis with varices of the lower GI track and very low hemoglobin levels.  He was unresponsive for quite some time during the six week hospitalization.  Since then he s had multiple varices banded, and has needed regular, paracentesis since January, now weekly.  There have been many hospitalizations for bleeds as well, requiring transfusions.  He s not a liver transplant candidate.  There have been concerns for hepatic encephalopathy, despite taking the prescribed lactulose daily (though it is not producing as many loose stools as desired).      When he saw Dr. Barr in 2014, he reported a weekly consumption of 16 beers; this was disputed by his wife, who reported he was drinking a case of beer every couple of days.  He stopped drinking a couple of years earlier after getting a DWI and had a chemical dependency evaluation which concluded he had  alcohol issues  but was not alcoholic.  At that time he didn t report illicit drug use or tobacco use.  Consistent with those accounts, he told me he hadn t used alcohol in the last seven years, but then contradicted himself by saying he wasn t completely abstinent until two years ago, when he was diagnosed with the GI bleed.  Before that he was drinking moderately, an average of four beers on each weekend day, for a total of eight beers a week.      The neurological history is otherwise mentionable for a seizure disorder related to the brain tumor.  He sees one of our neurologists, Dr. Anil English for that.  His most recent note indicates the patient has been seizure free for many months on phenytoin.      He was diagnosed with ADHD in childhood and the inattentiveness worsened after the brain tumor treatment.  He is on Ritalin for that and takes Remeron for depression.  He sees a psychiatrist and counselor in our  Psychiatry Clinic.      He is adopted so very little is known about his family of origin, but he s learned his biological father was alcoholic and  of health problems related to alcoholism.      During testing he again worked very slowly and needed a food break to guard against low blood sugars.  Interpersonal behavior was slightly inappropriate (he offered the examiner a taste of his beverage, removed his slippers to point out something on his foot, etc.).  He was very slow to gather his thoughts with much word searching and there were many heavy sighs.  He complained his hands were shaky, though this was not usually evident.  Gait was unsteady, as though he might tip over.  Due to his very slow progress, poor physical condition, and obvious major deficits, the battery was abbreviated substantially.     NEUROPSYCHOLOGICAL FINDINGS:    Select intellectual abilities were assessed with subtests from the Wechsler Adult Intelligence Scale-IV.  Auditory attention span on a digit sequence learning exercise (Digit Span) was very impaired.  He could inconsistently repeat up to five digits in the forward direction but not even two in the reverse order.  Speeded graphomotor learning (Coding) was also very impaired.  He was extremely slow and also error prone on this simple timed transcription task.  Visuospatial processing and constructional abilities (Block Design) were severely impaired, and he was only able to solve the easiest four-block design on the second attempt, going on to fail the next two, harder items, at which point the test was discontinued.  Word knowledge and expressive communicability (Vocabulary) was borderline impaired.  He gave sparse, unelaborated responses and was unfamiliar with advanced words.      Memory was very impaired under verbal and nonverbal conditions.  Immediate memory for two story passages from the Wechsler Memory Scale-Revised was very impaired with just six of 50 story elements  recalled immediately after presentation.  Spontaneous recall of the stories 30 minutes later was nil, but with cueing a few details were remembered.  Most of the meager information originally learned.  Immediate memory for figural materials (four designs from the WMS) was impaired.  His drawings were small and shaky from the tremor.  Free recall 30 minutes later was severely impaired, with only the initial, simplest figure remembered.  It was drawn twice, in a perseverative manner, and he did not benefit from visual cues.  Just one of the figures was correctly recognized when presented in multiple choice format.    Performance on a simple numerical sequencing exercise (Trail Making Test Part A), requiring sustained attention, was very impaired for speed with one error.  A more difficult sequencing exercise (Trail Making Test Part B), requiring divided attention, was discontinued after five minutes of effort and very little progress with errors.  Verbal associative fluency on the Controlled Oral Word Association Test (generating words beginning with target letters) was severely impaired and he had a difficult time retaining and following directives.  Confrontation naming on the Gillett Naming Test was impaired with 44 of the 57 pictured items presented correctly named.  A variety off brief exercises requiring sustained attention and cognitive flexibility (Test of Sustained Attention and Tracking) were completed very slowly with 24 errors.          CONCLUSIONS AND RECOMMENDATIONS:    This unfortunate 47-year-old man was treated for a left parietal oligoastrocytoma in 2013, first undergoing tumor resection followed by radiation therapy and Temador.  There has been no evidence of tumor progression/reoccurrence, brain scans unchanged for at least the last four years or so.  There is a history of academic learning difficulties, inattentiveness, depression, and alcohol abuse.  Early last year he was hospitalized after his  wife found him unconscious in a pool of blood.  During the long hospitalization he was assessed as having severe cirrhosis with ascites, and has since required esophageal and GI banding and now needs weekly paracentesis.  Cognition was impaired when he was initially tested by my colleague, Dr. Rashmi Barr, in 2014.  He continues to receive psychiatric services through the [facility] and is on mirtazapine, methylphenidate, gabapentin, and trazadone.  He takes lactulose for hepatic encephalopathy and phenytoin for seizures.  He reports he s been completely abstinent from alcohol since he was diagnosed with the cirrhosis a couple of years ago, and denies tobacco or recreational drug use as well.  Mr. Varghese formally worked as a , but has been essentially unemployed for many years due to the health problems.  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      He presents as an ill appearing man, slightly unkempt, unsteady, sluggish, with bradyphrenia, very slow to move and gather his thoughts.  The test battery was greatly abbreviated due to slow progress and his overall fragility.      Testing reveals  global impairment, with major deficits in all cognitive domains assessed.  Word knowledge is borderline while working memory, visuospatial reasoning, and processing speeds are more severely impaired.  He s very slow to retrieve words under speeded conditions, and confrontation naming is also impaired.  He is extremely slow and error-prone on all timed, speeded tests including those requiring sustained and divided attention.  Short and long-term retention of story passages and figural material is severely impaired.      Most abilities have declined significantly since he was last tested four years ago.  The basis for this decline is multifactorial, chiefly hepatic encephalopathy superimposed on preexisting  language and spatial deficits related to the left (probably dominant) parietal lesion.  Mental status likely fluctuates with liver functioning.  Also, he s on several sedating medications that could contribute to cognitive and physical slowing.     At this point Mr. Varghese is very disabled and really needs supervision and assistance throughout the day.  He cannot be relied upon to take his medications or remember and follow other directives, nor is he able to make well reasoned decisions for himself.  He could not be relied upon to problem solve effectively if faced with an emergency.  I gather he is assisted by his wife and other family members throughout the day.  He should continue with mental health treatment and of course abstain from ETOH.    Marie Babb Psy.D.   Licensed Psychologist, L.P. 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  This evaluation included approximately three hours of testing administered by a psychometrist with interpretation by a neuropsychologist (CPT 17202) and an additional three hours of professional time spent on the interview, data integration, record review, and report preparation (CPT 43823).    DDR: (ESTRELLA)

## 2018-06-07 NOTE — TELEPHONE ENCOUNTER
gabapentin (NEURONTIN) 100 MG capsule    Last Written Prescription Date:  4/25/18  Last Fill Quantity: 30,   # refills: 3  Last Office Visit : 1/23/18  Future Office visit:  None currently scheduled    Routing refill request to provider for review/approval because:  Drug not on the [facility], P or [facility] refill protocol or controlled substance

## 2018-06-13 NOTE — TELEPHONE ENCOUNTER
Last seen: 1/23/18  RTC: 3 months  Cancel: none  No-show: none  Next appt: none     Incoming refill from [facility] via fax     Medication requested: methylphenidate (RITALIN) 10 MG tablet  Directions: 10mg twice a day  Qty: 60  Last refilled: 5/11/18, 4/13/18, and 3/15/18 per [place]      Medication refill approved per refill protocol  30 d/s script printed and placed in provider's folder for signature. Note asking pt to call clinic and schedule appt included.   Med tab changed to reflect this.

## 2018-06-14 NOTE — TELEPHONE ENCOUNTER
-Received signed script from provider  -Mailed to:   [facility] [id number] [place] [address]

## 2018-06-19 NOTE — TELEPHONE ENCOUNTER
Medication requested: Trazodone 50 mg tabs  Last refilled: 5-20-18  Qty: 30      Last seen: 1-23-18  RTC: 3 mo or PRN  Cancel: 0  No-show: 0  Next appt: none    Refill decision:   30 day fransisco refill sent to the pharmacy - including instructions for patient to call the clinic and schedule an appointment.    Scheduling has been notified to contact the pt for appointment.    Will send FYI to provider as is outside RTC timeframe.      Kathleen M Doege RN

## 2018-06-22 NOTE — PROGRESS NOTES
Surgeon Guru Jose Kelly MD   Location of Surgery: [facility] OR  Date of Surgery:  7/11/18  Procedure: COMBINED ESOPHAGOSCOPY, GASTROSCOPY, DUODENOSCOPY (EGD)  History of reaction to anesthesia?  no

## 2018-06-22 NOTE — MR AVS SNAPSHOT
After Visit Summary   6/22/2018    Mono Varghese    MRN: [id number]           Patient Information     Date Of Birth          1970        Visit Information        Provider Department      6/22/2018 12:35 PM [place] Louis MD [facility]        Today's Diagnoses     Anemia, unspecified type    -  1    Loss of weight          Care Instructions    Primary Care Center Phone Number [phone]  Primary Care Center Medication Refill Request Information:  * Please contact your pharmacy regarding ANY request for medication refills.  ** [facility] Prescription Fax = [phone]  * Please allow 3 business days for routine medication refills.  * Please allow 5 business days for controlled substance medication refills.     Primary Care Center Test Result notification information:  *You will be notified with in 7-10 days of your appointment day regarding the results of your test.  If you are on MyChart you will be notified as soon as the provider has reviewed the results and signed off on them.          Nutrition [phone] (Multiple locations)           Follow-ups after your visit        Additional Services     NUTRITION REFERRAL       Your provider has referred you to: [facility]: [facility] (on call location)  - [place] [phone]   [email or web address]    Please be aware that coverage of these services is subject to the terms and limitations of your health insurance plan.  Call member services at your health plan with any benefit or coverage questions.      Please bring the following with you to your appointment:    (1) This referral request  (2) Any documents given to you regarding this referral  (3) Any specific questions you have about diet and/or food choices                  Your next 10 appointments already scheduled     Jun 22, 2018  1:15 PM CDT   LAB with  LAB   [facility] ([facility])    [address] [place] [address]    [employer] [address]   [phone]           Please do not eat 10-12 hours before your appointment if you are coming in fasting for labs on lipids, cholesterol, or glucose (sugar). This does not apply to pregnant women. Water, hot tea and black coffee (with nothing added) are okay. Do not drink other fluids, diet soda or chew gum.            Jul 02, 2018  9:30 AM CDT   Adult Med Follow UP with Leryo Alcaraz MD   [facility] ([facility])    [address]  [address] [address]   [phone]            Jul 11, 2018   Procedure with Guru Jose Kelly MD   [facility] [place], Same Day Surgery (--)    [address] [facility] [address]   [phone]            Aug 01, 2018  7:30 AM CDT   Lab with  LAB   [facility] ([facility])    [address] [place] [address]   [phone]            Aug 01, 2018  8:30 AM CDT   (Arrive by 8:15 AM)   Return General Liver with Baetriz Tanner MD   [facility] Hepatology ([facility])    [address] [place] [address]  [address]  [facility] [address]   [phone]            Sep 04, 2018 12:30 PM CDT   (Arrive by 12:15 PM)   Return Seizure with Anil English MD   [facility] Neurology ([facility])    [address] [place] [address] [phone]            Apr 26, 2019 10:00 AM CDT   MR BRAIN W/O & W CONTRAST with [address] [facility] [place] [facility] ([facility])    [address] [place] [address] [phone]           Take your medicines as usual, unless your doctor tells you not to. Bring a list of your current medicines to your exam (including vitamins, minerals and over-the-counter drugs).  You may or may not receive intravenous (IV) contrast for this exam pending the discretion of the Radiologist.   You do not need to do anything special to prepare.  The MRI machine uses a strong magnet. Please wear clothes without metal (snaps, zippers). A sweatsuit works well, or we may give you a hospital gown.  Please remove any body piercings and hair extensions before you arrive. You will also remove watches, jewelry, hairpins, wallets, dentures, partial dental plates and hearing aids. You may wear contact lenses, and you may be able to wear your rings. We have a safe place to keep your personal items, but it is safer to leave them at home.  **IMPORTANT** THE INSTRUCTIONS BELOW ARE ONLY FOR THOSE PATIENTS WHO HAVE BEEN PRESCRIBED SEDATION OR GENERAL ANESTHESIA DURING THEIR MRI PROCEDURE:  IF YOUR DOCTOR PRESCRIBED ORAL SEDATION (take medicine to help you relax during your exam):   You must get the medicine from your doctor (oral medication) before you arrive. Bring the medicine to the exam. Do not take it at home. You ll be told when to take it upon arriving for your exam.   Arrive one hour early. Bring someone who can take you home after the test. Your medicine will make you sleepy. After the exam, you may not drive, take a bus or take a taxi by yourself.  IF YOUR DOCTOR PRESCRIBED IV SEDATION:   Arrive one hour early. Bring someone who can take you home after the test. Your medicine will make you sleepy. After the exam, you may not drive, take a bus or take a taxi by yourself.   No eating 6 hours before your exam. You may have clear liquids up until 4 hours before your exam. (Clear liquids include water, clear tea, black coffee and fruit juice without pulp.)  IF YOUR DOCTOR PRESCRIBED ANESTHESIA (be asleep for your exam):   Arrive 1 1/2 hours early. Bring someone who can take you home after the test. You may not drive, take a bus or take a taxi by yourself.   No eating 8 hours before your exam. You may have clear liquids up until 4 hours before your exam. (Clear liquids include water, clear tea, black coffee and fruit  juice without pulp.)   You will spend four to five hours in the recovery room.  Please call the Imaging Department at your exam site with any questions.            May 03, 2019 10:45 AM CDT   (Arrive by 10:30 AM)   Return Visit with Lauro Shaw MD   [facility] ([facility])    [address]  [address]  [facility] [address] [phone]              Future tests that were ordered for you today     Open Future Orders        Priority Expected Expires Ordered    CBC with platelets Routine 6/22/2018 7/6/2018 6/22/2018    Basic metabolic panel Routine 6/22/2018 7/6/2018 6/22/2018            Who to contact     Please call your clinic at [phone] to:    Ask questions about your health    Make or cancel appointments    Discuss your medicines    Learn about your test results    Speak to your doctor            Additional Information About Your Visit        [employer] Information     [employer] gives you secure access to your electronic health record. If you see a primary care provider, you can also send messages to your care team and make appointments. If you have questions, please call your primary care clinic.  If you do not have a primary care provider, please call [phone] and they will assist you.      [employer] is an electronic gateway that provides easy, online access to your medical records. With [employer], you can request a clinic appointment, read your test results, renew a prescription or communicate with your care team.     To access your existing account, please contact your [facility] or call [phone] for assistance.        Care EveryWhere ID     This is your Care EveryWhere ID. This could be used by other organizations to access your [facility] medical records  [id number]        Your Vitals Were     Temperature Respirations Pulse Oximetry BMI (Body Mass Index)          97.7  F (36.5  C) (Oral) 18 97% 26.83 kg/m2          Blood Pressure from Last 3 Encounters:   06/22/18 125/74   05/30/18 (!) 133/104   05/25/18 122/71    Weight from Last 3 Encounters:   06/22/18 84.8 kg (187 lb)   05/30/18 88.6 kg (195 lb 5.2 oz)   05/25/18 86 kg (189 lb 11.2 oz)              We Performed the Following     NUTRITION REFERRAL          Today's Medication Changes          These changes are accurate as of 6/22/18 12:59 PM.  If you have any questions, ask your nurse or doctor.               These medicines have changed or have updated prescriptions.        Dose/Directions    cyanocobalamin 1000 MCG tablet   Commonly known as:  vitamin  B-12   This may have changed:  when to take this   Used for:  Alcoholic cirrhosis of liver with ascites (H)        Dose:  1000 mcg   1 tablet (1,000 mcg) by Per G Tube route daily   Quantity:  130 tablet   Refills:  2                Primary Care Provider Office Phone # Fax #    [place] [phone]        [address] [address]        Equal Access to Services     [facility]: Hadii kevin mendoza hadasho Sojanna, waaxda luqadaha, qaybta kaalmada adelyly, josue roque . So [facility] [phone].    ATENCIÓN: Si habla español, tiene a chiang disposición servicios gratuitos de asistencia lingüística. Llame al [phone].    We comply with applicable federal civil rights laws and [place] laws. We do not discriminate on the basis of race, color, national origin, age, disability, sex, sexual orientation, or gender identity.            Thank you!     Thank you for choosing [facility]  for your care. Our goal is always to provide you with excellent care. Hearing back from our patients is one way we can continue to improve our services. Please take a few minutes to complete the written survey that you may receive in the mail after your visit with us. Thank you!             Your Updated Medication List - Protect others around you: Learn how to  safely use, store and throw away your medicines at www.disposemymeds.org.          This list is accurate as of 6/22/18 12:59 PM.  Always use your most recent med list.                   Brand Name Dispense Instructions for use Diagnosis    blood glucose monitoring test strip    ONETOUCH ULTRA    400 each    Use to test blood sugars 4 times daily as needed or as directed.    Diabetes mellitus, type 2 (H)       Calcium Carb-Cholecalciferol 500-400 MG-UNIT Tabs    calcium 500 +D    90 tablet    Take 500 mg by mouth daily    Malnutrition (H)       CANE, ANY MATERIAL     1 each    One cane    Balance disorder       ciclopirox 0.77 % cream    LOPROX    90 g    Apply topically 2 times daily To feet and toenails.    Tinea pedis of both feet       cyanocobalamin 1000 MCG tablet    vitamin  B-12    130 tablet    1 tablet (1,000 mcg) by Per G Tube route daily    Alcoholic cirrhosis of liver with ascites (H)       dulaglutide 1.5 MG/0.5ML pen    TRULICITY    6 mL    Inject 1.5 mg Subcutaneous every 7 days    DM type 2, goal HbA1c 7%-8% (H)       ferrous sulfate 325 (65 Fe) MG tablet    IRON    200 tablet    Take 1 tablet (325 mg) by mouth 2 times daily    Other iron deficiency anemia       furosemide 20 MG tablet    LASIX    30 tablet    Take 1 tablet (20 mg) by mouth daily    Other ascites       * gabapentin 100 MG capsule    NEURONTIN    30 capsule    Take 1 capsule (100 mg) by mouth At Bedtime    Mild episode of recurrent major depressive disorder (H)       * gabapentin 100 MG capsule    NEURONTIN    90 capsule    TAKE ONE CAPSULE BY MOUTH AT BEDTIME    Mild episode of recurrent major depressive disorder (H)       HYDROcodone-acetaminophen 5-325 MG per tablet    NORCO    60 tablet    Take 2 tablets by mouth every 6 hours as needed for moderate to severe pain    Brain tumor (H)       hydrOXYzine 25 MG tablet    ATARAX    180 tablet    Take 1 tablet (25 mg) by mouth 2 times daily    Itching, Anxiety       lactulose 10 GM/15ML  solution    CHRONULAC    946 mL    Take 15 mLs (10 g) by mouth daily    Hepatic encephalopathy (H)       levothyroxine 88 MCG tablet    SYNTHROID/LEVOTHROID    90 tablet    Take 1 tablet (88 mcg) by mouth daily    Hypothyroidism       lidocaine 4 % Crea cream    LMX4    133 g    Apply topically once as needed for mild pain    Port catheter in place       methylphenidate 10 MG tablet    RITALIN    60 tablet    10mg twice a day    Major depressive disorder with single episode, in partial remission (H)       mirtazapine 45 MG tablet    REMERON    30 tablet    Take 1 tablet (45 mg) by mouth At Bedtime    Major depressive disorder with single episode, in partial remission (H)       multivitamin, therapeutic with minerals Tabs tablet      Take 1 tablet by mouth 2 times daily        mupirocin 2 % ointment    BACTROBAN    22 g    Use in nose 2-3 times per week    Epistaxis       omeprazole 20 MG CR capsule    priLOSEC    360 capsule    Take 2 capsules (40 mg) by mouth 2 times daily    Gastroesophageal reflux disease without esophagitis       ONETOUCH LANCETS Misc     100 each    by In Vitro route 4 times daily as needed    Type II or unspecified type diabetes mellitus without mention of complication, not stated as uncontrolled       phenytoin 30 MG CR capsule    DILANTIN    720 capsule    Take 4 capsules (120 mg) by mouth 2 times daily    Oligoastrocytoma of parietal lobe (H)       pravastatin 80 MG tablet    PRAVACHOL    90 tablet    Take 1 tablet (80 mg) by mouth daily    High cholesterol       simethicone 80 MG chewable tablet    MYLICON    100 tablet    Take 1 tablet (80 mg) by mouth every 6 hours as needed for cramping    Abdominal cramping       spironolactone 25 MG tablet    ALDACTONE    180 tablet    Take 2 tablets (50 mg) by mouth daily    Other ascites       sulfamethoxazole-trimethoprim 800-160 MG per tablet    BACTRIM DS/SEPTRA DS    30 tablet    Take 1 tablet by mouth daily    Spontaneous bacterial peritonitis  (H)       thiamine 100 MG tablet     100 tablet    Take 1 tablet (100 mg) by mouth daily    Alcoholic cirrhosis of liver with ascites (H)       traZODone 50 MG tablet    DESYREL    30 tablet    Take 1 tablet (50 mg) by mouth nightly as needed for sleep    Primary insomnia       XIFAXAN 550 MG Tabs tablet   Generic drug:  rifaximin     60 tablet    TAKE ONE TABLET BY MOUTH TWICE DAILY    Hepatic encephalopathy (H)       * Notice:  This list has 2 medication(s) that are the same as other medications prescribed for you. Read the directions carefully, and ask your doctor or other care provider to review them with you.

## 2018-06-22 NOTE — PATIENT INSTRUCTIONS
Primary Care Center Phone Number [phone]  Primary Care Center Medication Refill Request Information:  * Please contact your pharmacy regarding ANY request for medication refills.  ** [facility] Prescription Fax = [phone]  * Please allow 3 business days for routine medication refills.  * Please allow 5 business days for controlled substance medication refills.     Primary Care Center Test Result notification information:  *You will be notified with in 7-10 days of your appointment day regarding the results of your test.  If you are on MyChart you will be notified as soon as the provider has reviewed the results and signed off on them.          Nutrition [phone] (Multiple locations)

## 2018-06-22 NOTE — PROGRESS NOTES
[facility]   King Louis MD  06/22/2018     Chief Complaint:   Pre-Op Exam        History of Present Illness:   Mono Varghese is 47 year old male here at the request of Dr. Kelly for cardiovascular, pulmonary, and perioperative risk assessment prior to surgery.  The intended surgical procedure is an endoscopy with variceal banding 7/11/18.  A copy of this note will be sent to the surgeon. 10 pound weight loss due to decreased appetite. There is no vomiting and only occasional nausea. He was not taking lactulose for a while but has resumed now having 3-4 bowel movements per day. He has been drinking more tea and water. This is an ongoing issue for a year, when fluid build up started. If someone serves him food he will eat. He was having trouble with endurance and had to discontinue work. He has less interest in activities he used to enjoy like poker. The frequency and of endoscopies and amount of banding required has been increasing. Lasix helped his abdomen swelling but he is still having to drain 8 to 10 L every other week via paracentesis  .     Bathing has been difficult, he did not bath for 6 weeks. There has been some skin breakdown his wife noticed. He also has a dry mouth, he takes gabapentin and hydroxyzine.      Reason for surgery:  (must document 4+HPI factors for completion)  Patient has a history of esophageal varices and multiple EGDs to monitor.       Cardiovascular Risk:  This patient does not have chest pain with ambulation or walking up a flight of stairs.  There is not any chest pain with exercise.  He does have a history of hypertension and high cholesterol.  There is not a history of stroke or valvular disease.    Pulmonary Risk:  The patient does not have any history of lung problems.    Perioperative Complications:  The patient does have a history of bleeding or clotting problems in the past, a portal vein thrombosis and LENA  They are not currently  anticoagulated.  He has had complications from past surgeries with epistaxis, but is not currently dealing with this.  The patient is adopted and does not know of a family history of any anesthesia or surgical complications.    Review of Systems:   Pertinent items are noted in HPI, remainder of complete ROS is negative.      Active Medications:    Calcium Carb-Cholecalciferol (CALCIUM 500 +D) 500-400 MG-UNIT TABS, Take 500 mg by mouth daily, Disp: 90 tablet, Rfl: 1     ciclopirox (LOPROX) 0.77 % cream, Apply topically 2 times daily To feet and toenails., Disp: 90 g, Rfl: 4     cyanocobalamin (VITAMIN  B-12) 1000 MCG tablet, 1 tablet (1,000 mcg) by Per G Tube route daily (Patient taking differently: 1,000 mcg by Per G Tube route every morning ), Disp: 130 tablet, Rfl: 2     dulaglutide (TRULICITY) 1.5 MG/0.5ML pen, Inject 1.5 mg Subcutaneous every 7 days, Disp: 6 mL, Rfl: 0     ferrous sulfate (IRON) 325 (65 FE) MG tablet, Take 1 tablet (325 mg) by mouth 2 times daily, Disp: 200 tablet, Rfl: 3     furosemide (LASIX) 20 MG tablet, Take 1 tablet (20 mg) by mouth daily, Disp: 30 tablet, Rfl: 3     gabapentin (NEURONTIN) 100 MG capsule, TAKE ONE CAPSULE BY MOUTH AT BEDTIME, Disp: 90 capsule, Rfl: 0     gabapentin (NEURONTIN) 100 MG capsule, Take 1 capsule (100 mg) by mouth At Bedtime, Disp: 30 capsule, Rfl: 3     HYDROcodone-acetaminophen (NORCO) 5-325 MG per tablet, Take 2 tablets by mouth every 6 hours as needed for moderate to severe pain, Disp: 60 tablet, Rfl: 0     hydrOXYzine (ATARAX) 25 MG tablet, Take 1 tablet (25 mg) by mouth 2 times daily, Disp: 180 tablet, Rfl: 0     lactulose (CHRONULAC) 10 GM/15ML solution, Take 15 mLs (10 g) by mouth daily, Disp: 946 mL, Rfl: 3     levothyroxine (SYNTHROID/LEVOTHROID) 88 MCG tablet, Take 1 tablet (88 mcg) by mouth daily, Disp: 90 tablet, Rfl: 1     lidocaine (LMX4) 4 % CREA cream, Apply topically once as needed for mild pain, Disp: 133 g, Rfl: 1     methylphenidate  (RITALIN) 10 MG tablet, 10mg twice a day, Disp: 60 tablet, Rfl: 0     mirtazapine (REMERON) 45 MG tablet, Take 1 tablet (45 mg) by mouth At Bedtime, Disp: 30 tablet, Rfl: 3     multivitamin, therapeutic with minerals (THERA-VIT-M) TABS, Take 1 tablet by mouth 2 times daily, Disp: , Rfl:      mupirocin (BACTROBAN) 2 % ointment, Use in nose 2-3 times per week, Disp: 22 g, Rfl: 0     omeprazole (PRILOSEC) 20 MG CR capsule, Take 2 capsules (40 mg) by mouth 2 times daily, Disp: 360 capsule, Rfl: 3     phenytoin (DILANTIN) 30 MG CR capsule, Take 4 capsules (120 mg) by mouth 2 times daily, Disp: 720 capsule, Rfl: 11     pravastatin (PRAVACHOL) 80 MG tablet, Take 1 tablet (80 mg) by mouth daily, Disp: 90 tablet, Rfl: 3     spironolactone (ALDACTONE) 25 MG tablet, Take 2 tablets (50 mg) by mouth daily, Disp: 180 tablet, Rfl: 1     sulfamethoxazole-trimethoprim (BACTRIM DS/SEPTRA DS) 800-160 MG per tablet, Take 1 tablet by mouth daily, Disp: 30 tablet, Rfl: 5     thiamine (VITAMIN B-1) 100 MG tablet, Take 1 tablet (100 mg) by mouth daily, Disp: 100 tablet, Rfl: 1     traZODone (DESYREL) 50 MG tablet, Take 1 tablet (50 mg) by mouth nightly as needed for sleep, Disp: 30 tablet, Rfl: 0     XIFAXAN 550 MG TABS tablet, TAKE ONE TABLET BY MOUTH TWICE DAILY, Disp: 60 tablet, Rfl: 11     simethicone (MYLICON) 80 MG chewable tablet, Take 1 tablet (80 mg) by mouth every 6 hours as needed for cramping (Patient not taking: Reported on 6/22/2018), Disp: 100 tablet, Rfl: 1      Allergies:   Latex; No clinical screening - see comments; and Tegaderm transparent dressing (informational only)    Past Medical History:  ADHD  Alcoholic cirrhosis of liver with ascites  Chronic pain  Depressive disorder  Encephalopathy, hepatic  GERD (gastroesophageal reflux disease)  GI bleed  oligosatrocytoma of parietal lobe  LENA  Hyperlipidemia, LDL goal <100  hypothyroidism   Liver failure  Moderate major depression  liver failure    Obesity  Pancytopenia  Partial epilepsy  Hypertensive gastropathy  Portal vein thrombosis  Pulmonary nodules  Type 2 diabetes  Rectal bleeding  Thrombocytopenia  Myopic astigmatism bilateral  Presbyopia  Ringing ears  Pancytopenia  Hepatic encephalopathy     Past Surgical History:  Past Surgical History:   Procedure Laterality Date     COLONOSCOPY N/A 11/27/2015    Procedure: COMBINED COLONOSCOPY, SINGLE OR MULTIPLE BIOPSY/POLYPECTOMY BY BIOPSY;  Surgeon: Ran Thurston MD;  Location: [facility]     ESOPHAGOSCOPY, GASTROSCOPY, DUODENOSCOPY (EGD), COMBINED N/A 11/23/2015    Procedure: COMBINED ESOPHAGOSCOPY, GASTROSCOPY, DUODENOSCOPY (EGD);  Surgeon: Rocael Rizvi MD;  Location: [facility]     ESOPHAGOSCOPY, GASTROSCOPY, DUODENOSCOPY (EGD), COMBINED N/A 1/25/2017    Procedure: COMBINED ESOPHAGOSCOPY, GASTROSCOPY, DUODENOSCOPY (EGD), BIOPSY SINGLE OR MULTIPLE;  Surgeon: Rocael Tejada MD;  Location: [facility]     ESOPHAGOSCOPY, GASTROSCOPY, DUODENOSCOPY (EGD), COMBINED N/A 3/30/2017    Procedure: COMBINED ESOPHAGOSCOPY, GASTROSCOPY, DUODENOSCOPY (EGD);  Surgeon: Jordana Ramirez MD;  Location: [facility]     ESOPHAGOSCOPY, GASTROSCOPY, DUODENOSCOPY (EGD), COMBINED N/A 1/19/2018    Procedure: COMBINED ESOPHAGOSCOPY, GASTROSCOPY, DUODENOSCOPY (EGD);  Upper Endoscopy with verceal banding; Flexible Sigmoidoscopy;  Surgeon: Guru Jose Kelly MD;  Location: [facility]     ESOPHAGOSCOPY, GASTROSCOPY, DUODENOSCOPY (EGD), COMBINED N/A 2/12/2018    Procedure: COMBINED ESOPHAGOSCOPY, GASTROSCOPY, DUODENOSCOPY (EGD);  Esophagogastroduodenoscopy with variceal banding;  Surgeon: Guru Jose Kelly MD;  Location: [facility]     ESOPHAGOSCOPY, GASTROSCOPY, DUODENOSCOPY (EGD), COMBINED N/A 3/5/2018    Procedure: COMBINED ESOPHAGOSCOPY, GASTROSCOPY, DUODENOSCOPY (EGD);  Esophagogastroduodenoscopy  with banding of varices Latex Allergy ;  Surgeon: Guru Jose Kelly, MD;  Location:  [facility]     ESOPHAGOSCOPY, GASTROSCOPY, DUODENOSCOPY (EGD), COMBINED N/A 4/16/2018    Procedure: COMBINED ESOPHAGOSCOPY, GASTROSCOPY, DUODENOSCOPY (EGD);  Upper Endoscopy  with esophageal varices banding; Latex Allergy ;  Surgeon: Guru Jose Kelly MD;  Location: [facility]     ESOPHAGOSCOPY, GASTROSCOPY, DUODENOSCOPY (EGD), COMBINED N/A 5/30/2018    Procedure: COMBINED ESOPHAGOSCOPY, GASTROSCOPY, DUODENOSCOPY (EGD);  upper endoscopy with banding of esophageal varices. *latex Allergy*;  Surgeon: Guru Jose Kelly MD;  Location:  OR     OPTICAL TRACKING SYSTEM CRANIOTOMY, EXCISE TUMOR, COMBINED  6/6/2013    Procedure: COMBINED OPTICAL TRACKING SYSTEM CRANIOTOMY, EXCISE TUMOR;  Left Stealth Guided Craniotomy , Tumor Resection ;  Surgeon: J Carlos Aranda MD;  Location:  OR     ORTHOPEDIC SURGERY      hand surgery- right     SIGMOIDOSCOPY FLEXIBLE N/A 11/24/2015    Procedure: SIGMOIDOSCOPY FLEXIBLE;  Surgeon: Rocael Rizvi MD;  Location:  GI     SIGMOIDOSCOPY FLEXIBLE N/A 1/19/2018    Procedure: SIGMOIDOSCOPY FLEXIBLE;;  Surgeon: Guru Jose Kelly MD;  Location:  OR         Family History:   Adopted  Father: cirrhosis     Social History:     Non smoker    Physical Exam:   /74  Temp 97.7  F (36.5  C) (Oral)  Resp 18  Wt 84.8 kg (187 lb)  SpO2 97%  BMI 26.83 kg/m2   Constitutional: Alert. In no distress.  Head: Normocephalic. No masses, lesions, tenderness or abnormalities.  ENT: No neck nodes or sinus tenderness.  Cardiovascular: RRR. No murmurs, clicks, gallops, or rub.  Respiratory: Clear to auscultation bilaterally, no wheezes or crackles.  Gastrointestinal: Abdomen soft. Non-tender. BS normal. Abdomen distended.   Musculoskeletal: Extremities normal. No gross deformities noted. Normal muscle tone.  Skin: No suspicious lesions. No rashes.  Neurologic: Gait normal. Reflexes normal and symmetric. Sensation grossly WNL.  Psychiatric:  Mentation appears normal. Normal affect.   Hematologic/Lymphatic/Immunologic: Normal cervical lymph nodes.        Recent Labs:  CBC for low hgb/plt:    BMP for DM:    EKG:  Indication:                  Pre-op  Date:                                    02/09/2018  Time:                                   12:29  Impression:               Sinus rhythm                                              Normal ECG                                              When compared with ECG of 11-FEB-2016 12:40,                                              No significant change was found                                              Unconfirmed report - this is a computer generated report only - please see the primary care center for final interpretation   Vent. Rate:                72  R-axis:                                 -2        Assessment and Plan:  There are no diagnoses linked to this encounter.      1. Pre-operative assessment for endoscopy with variceal banding 7/11/18.  The patient is at LOW risk for cardiovascular complications and at LOW risk for pulmonary complications of this LOW risk surgery.    --Approval given to proceed with proposed procedure, without further diagnostic evaluation      The patient has been told to not take any aspirin or NSAIDs for 10 days prior to surgery. The patient has been instructed as to what to do with medications prior to surgery.    Follow-up: Return to clinic in two months.         Scribe Disclosure:   I, Mundo Gomez, am serving as a scribe to document services personally performed by King Louis MD at this visit, based upon the provider's statements to me. All documentation has been reviewed by the aforementioned provider prior to being entered into the official medical record.     Portions of this medical record were completed by a scribe. UPON MY REVIEW AND AUTHENTICATION BY ELECTRONIC SIGNATURE, this confirms (a) I performed the applicable clinical services, and (b) the  record is accurate.    King Louis MD

## 2018-06-22 NOTE — NURSING NOTE
Chief Complaint   Patient presents with     Pre-Op Exam     Mono is here for his pre-op for surgery on 7/11 upper endoscopy.       Paradise Persaud LPN at 12:26 PM on 6/22/2018
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
## 2018-07-02 NOTE — PATIENT INSTRUCTIONS
1. Continue your medications as prescribed, but move your mirtazapine (Remeron) to 1-2 hours before bedtime to reduce tiredness the next day.    2. Try to increase physical activity by walking 5-10 minutes daily. Can increase motivation and accountability by walking with Yisel or Faby.      Please follow-up in 3 months or call sooner if there are any questions.     Thank you for coming to the PSYCHIATRY CLINIC.    Lab Testing:  If you had lab testing today and your results are reassuring or normal they will be mailed to you or sent through [email or web address] within 7 days.   If the lab tests need quick action we will call you with the results.  The phone number we will call with results is # [phone] (home) . If this is not the best number please call our clinic and change the number.    Medication Refills:  If you need any refills please call your pharmacy and they will contact us. Our fax number for refills is [phone]. Please allow three business for refill processing.   If you need to  your refill at a new pharmacy, please contact the new pharmacy directly. The new pharmacy will help you get your medications transferred.     Scheduling:  If you have any concerns about today's visit or wish to schedule another appointment please call our office during normal business hours [phone] (8-5:00 M-F)    Contact Us:  Please call [phone] during business hours (8-5:00 M-F).  If after clinic hours, or on the weekend, please call  [phone].    Financial Assistance [phone]  [facility] Billing [phone]  [facility] Billing [phone]  Medical Records [phone]      MENTAL HEALTH CRISIS NUMBERS:  [facility]:   [facility] - [phone]   Crisis Residence [facility] - [phone]   Walk-In Counseling Center [facility] - [phone]   COPE 24/7 [place] Mobile Team for Adults - [[phone]]; Child - [[phone]]     Crisis Connection - [phone]      [facility]:   [facility] - [phone]   Walk-in counseling [facility] - [phone]   Walk-in counseling [facility] - [phone]   Crisis Residence [facility] - [phone]   Urgent Care Adult Mental Health:   --Drop-in, 24/7 crisis line, and [place] Mobile Team [[phone]]    CRISIS TEXT LINE: Text [phone] from anywhere, anytime, any crisis 24/7;    OR SEE [email or web address]     Poison Control Center - [phone]    CHILD: [facility] needs assessment team - [phone]     [facility] Lifeline - [phone]; or [facility] Lifeline - [phone]    If you have a medical emergency please call 911or go to the nearest ER.                    _____________________________________________    Again thank you for choosing PSYCHIATRY CLINIC and please let us know how we can best partner with you to improve you and your family's health.  You may be receiving a survey in the mail regarding this appointment. We would love to have your feedback, both positive and negative, so please fill out the survey and return it using the provided envelope. The survey is done by an external company, so your answers are anonymous.

## 2018-07-02 NOTE — MR AVS SNAPSHOT
After Visit Summary   7/2/2018    Mono Varghese    MRN: [id number]           Patient Information     Date Of Birth          1970        Visit Information        Provider Department      7/2/2018 9:30 AM Leroy Alcaraz MD [facility]        Today's Diagnoses     Astrocytoma (H)        Hx of psychiatric care        Moderate major depression (H)        Attention deficit hyperactivity disorder (ADHD), unspecified ADHD type        Major depressive disorder with single episode, in partial remission (H)        Primary insomnia          Care Instructions    1. Continue your medications as prescribed, but move your mirtazapine (Remeron) to 1-2 hours before bedtime to reduce tiredness the next day.    2. Try to increase physical activity by walking 5-10 minutes daily. Can increase motivation and accountability by walking with Yisel or Faby.      Please follow-up in 3 months or call sooner if there are any questions.     Thank you for coming to the [facility].    Lab Testing:  If you had lab testing today and your results are reassuring or normal they will be mailed to you or sent through [employer] within 7 days.   If the lab tests need quick action we will call you with the results.  The phone number we will call with results is # [phone] (home) . If this is not the best number please call our clinic and change the number.    Medication Refills:  If you need any refills please call your pharmacy and they will contact us. Our fax number for refills is [phone]. Please allow three business for refill processing.   If you need to  your refill at a new pharmacy, please contact the new pharmacy directly. The new pharmacy will help you get your medications transferred.     Scheduling:  If you have any concerns about today's visit or wish to schedule another appointment please call our office during normal business hours [phone] (8-5:00 M-F)    Contact Us:  Please call  [phone] during business hours (8-5:00 M-F).  If after clinic hours, or on the weekend, please call  [phone].    Financial Assistance [phone]  [facility] Billing [phone]  [facility] [phone]  Medical Records [phone]      MENTAL HEALTH CRISIS NUMBERS:  [facility]:   [facility] [place] [facility] - [phone]   Crisis Residence [facility] - [phone]   Walk-In Counseling [facility] - [phone]   COPE 24/7 [facility] Mobile Team for Adults - [[phone]]; Child - [[phone]]     Crisis Connection - [phone]     [facility] - [phone]   Walk-in counseling [facility] - [phone]   Walk-in counseling [facility] - [phone]   Crisis Residence [facility] - [phone]   Urgent Care Adult Mental Health:   --Drop-in, 24/7 crisis line, and [facility] Mobile Team [[phone]]    CRISIS TEXT LINE: Text [phone] from anywhere, anytime, any crisis 24/7;    OR SEE www.crisistextline.org     Poison Control Center - [phone]    CHILD: [facility] needs assessment team - [phone]     [facility] - [phone]; or [facility] - [phone]    If you have a medical emergency please call 911or go to the nearest ER.                    _____________________________________________    Again thank you for choosing [facility] and please let us know how we can best partner with you to improve you and your family's health.  You may be receiving a survey in the mail regarding this appointment. We would love to have your feedback, both positive and negative, so please fill out the survey and return it using the provided envelope. The survey is done by an external company, so your answers are anonymous.             Follow-ups after your visit        Follow-up notes from your care team     Return in about 3 months (around 10/2/2018).      Your  next 10 appointments already scheduled     Jul 11, 2018   Procedure with Guru Jose Kelly MD   [facility] Same Day Surgery (--)    [address]  [place] [address]   [phone]            Aug 01, 2018  7:30 AM CDT   Lab with  LAB   [facility] ([facility])    [address]  1st Floor  [facility] [address]   [phone]            Aug 01, 2018  8:30 AM CDT   (Arrive by 8:15 AM)   Return General Liver with Beatriz Tanner MD   [facility] Hepatology ([facility])    [address]  [address]  [facility] [place] [address] [phone]            Sep 04, 2018 12:30 PM CDT   (Arrive by 12:15 PM)   Return Seizure with Anil English MD   [facility] Neurology ([facility])    [address]  3rd Floor  [place] [address] [phone]            Oct 02, 2018 10:00 AM CDT   Adult Med Follow UP with Leroy Alcaraz MD   Psychiatry Clinic ([facility])    [address]  [address] [place] [address] [phone]            Apr 26, 2019 10:00 AM CDT   MR BRAIN W/O & W CONTRAST with [address] [facility] [place] [facility] ([facility])    [address]  1st Floor  [facility] [address] [phone]           Take your medicines as usual, unless your doctor tells you not to. Bring a list of your current medicines to your exam (including vitamins, minerals and over-the-counter drugs).  You may or may not receive intravenous (IV) contrast for this exam pending the discretion of the Radiologist.  You do not need to do anything special to prepare.  The MRI machine uses a strong magnet. Please wear clothes without metal (snaps, zippers). A sweatsuit works well, or we may give you a hospital gown.  Please remove any body piercings and hair extensions before you arrive. You will also remove watches, jewelry,  hairpins, wallets, dentures, partial dental plates and hearing aids. You may wear contact lenses, and you may be able to wear your rings. We have a safe place to keep your personal items, but it is safer to leave them at home.  **IMPORTANT** THE INSTRUCTIONS BELOW ARE ONLY FOR THOSE PATIENTS WHO HAVE BEEN PRESCRIBED SEDATION OR GENERAL ANESTHESIA DURING THEIR MRI PROCEDURE:  IF YOUR DOCTOR PRESCRIBED ORAL SEDATION (take medicine to help you relax during your exam):   You must get the medicine from your doctor (oral medication) before you arrive. Bring the medicine to the exam. Do not take it at home. You ll be told when to take it upon arriving for your exam.   Arrive one hour early. Bring someone who can take you home after the test. Your medicine will make you sleepy. After the exam, you may not drive, take a bus or take a taxi by yourself.  IF YOUR DOCTOR PRESCRIBED IV SEDATION:   Arrive one hour early. Bring someone who can take you home after the test. Your medicine will make you sleepy. After the exam, you may not drive, take a bus or take a taxi by yourself.   No eating 6 hours before your exam. You may have clear liquids up until 4 hours before your exam. (Clear liquids include water, clear tea, black coffee and fruit juice without pulp.)  IF YOUR DOCTOR PRESCRIBED ANESTHESIA (be asleep for your exam):   Arrive 1 1/2 hours early. Bring someone who can take you home after the test. You may not drive, take a bus or take a taxi by yourself.   No eating 8 hours before your exam. You may have clear liquids up until 4 hours before your exam. (Clear liquids include water, clear tea, black coffee and fruit juice without pulp.)   You will spend four to five hours in the recovery room.  Please call the Imaging Department at your exam site with any questions.            May 03, 2019 10:45 AM CDT   (Arrive by 10:30 AM)   Return Visit with Lauro Shaw MD   [facility] ([facility]  [place])    [address]  [address]  [facility] [address] [phone]              Who to contact     Please call your clinic at [phone] to:    Ask questions about your health    Make or cancel appointments    Discuss your medicines    Learn about your test results    Speak to your doctor            Additional Information About Your Visit        [employer] Information     [employer] gives you secure access to your electronic health record. If you see a primary care provider, you can also send messages to your care team and make appointments. If you have questions, please call your primary care clinic.  If you do not have a primary care provider, please call [phone] and they will assist you.      [employer] is an electronic gateway that provides easy, online access to your medical records. With [employer], you can request a clinic appointment, read your test results, renew a prescription or communicate with your care team.     To access your existing account, please contact your [facility] or call [phone] for assistance.        Care EveryWhere ID     This is your Care EveryWhere ID. This could be used by other organizations to access your [place] medical records  [id number]        Your Vitals Were     Pulse BMI (Body Mass Index)                104 28.01 kg/m2           Blood Pressure from Last 3 Encounters:   07/02/18 137/76   06/22/18 125/74   05/30/18 (!) 133/104    Weight from Last 3 Encounters:   07/02/18 88.5 kg (195 lb 3.2 oz)   06/22/18 84.8 kg (187 lb)   05/30/18 88.6 kg (195 lb 5.2 oz)              Today, you had the following     No orders found for display         Today's Medication Changes          These changes are accurate as of 7/2/18 10:47 AM.  If you have any questions, ask your nurse or doctor.               These medicines have changed or have updated prescriptions.        Dose/Directions    cyanocobalamin 1000 MCG tablet   Commonly known as:   vitamin  B-12   This may have changed:  when to take this   Used for:  Alcoholic cirrhosis of liver with ascites (H)        Dose:  1000 mcg   1 tablet (1,000 mcg) by Per G Tube route daily   Quantity:  130 tablet   Refills:  2            Where to get your medicines      These medications were sent to [facility] [address] [place], [place] - [address] [address] [facility]  [address] [facility] [address]     Phone:  [phone]     mirtazapine 45 MG tablet    traZODone 50 MG tablet                Primary Care Provider Office Phone # Fax #    [place] [phone]       [address] [address]        Equal Access to Services     SAMUEL FAIR : Jennifer Thomas, waaxda luqadaha, qaybta kaalmada adeegyada, josue tamayo. So [facility] [phone].    ATENCIÓN: Si habla español, tiene a chiang disposición servicios gratuitos de asistencia lingüística. [facility] [phone].    We comply with applicable federal civil rights laws and [place] laws. We do not discriminate on the basis of race, color, national origin, age, disability, sex, sexual orientation, or gender identity.            Thank you!     Thank you for choosing [facility]  for your care. Our goal is always to provide you with excellent care. Hearing back from our patients is one way we can continue to improve our services. Please take a few minutes to complete the written survey that you may receive in the mail after your visit with us. Thank you!             Your Updated Medication List - Protect others around you: Learn how to safely use, store and throw away your medicines at www.disposemymeds.org.          This list is accurate as of 7/2/18 10:48 AM.  Always use your most recent med list.                   Brand Name Dispense Instructions for use Diagnosis    blood glucose monitoring test strip    ONETOUCH ULTRA    400 each    Use to test blood sugars 4 times daily as needed  or as directed.    Diabetes mellitus, type 2 (H)       Calcium Carb-Cholecalciferol 500-400 MG-UNIT Tabs    calcium 500 +D    90 tablet    Take 500 mg by mouth daily    Malnutrition (H)       CANE, ANY MATERIAL     1 each    One cane    Balance disorder       ciclopirox 0.77 % cream    LOPROX    90 g    Apply topically 2 times daily To feet and toenails.    Tinea pedis of both feet       cyanocobalamin 1000 MCG tablet    vitamin  B-12    130 tablet    1 tablet (1,000 mcg) by Per G Tube route daily    Alcoholic cirrhosis of liver with ascites (H)       dulaglutide 1.5 MG/0.5ML pen    TRULICITY    6 mL    Inject 1.5 mg Subcutaneous every 7 days    DM type 2, goal HbA1c 7%-8% (H)       ferrous sulfate 325 (65 Fe) MG tablet    IRON    200 tablet    Take 1 tablet (325 mg) by mouth 2 times daily    Other iron deficiency anemia       furosemide 20 MG tablet    LASIX    30 tablet    Take 1 tablet (20 mg) by mouth daily    Other ascites       * gabapentin 100 MG capsule    NEURONTIN    30 capsule    Take 1 capsule (100 mg) by mouth At Bedtime    Mild episode of recurrent major depressive disorder (H)       * gabapentin 100 MG capsule    NEURONTIN    90 capsule    TAKE ONE CAPSULE BY MOUTH AT BEDTIME    Mild episode of recurrent major depressive disorder (H)       HYDROcodone-acetaminophen 5-325 MG per tablet    NORCO    60 tablet    Take 2 tablets by mouth every 6 hours as needed for moderate to severe pain    Brain tumor (H)       hydrOXYzine 25 MG tablet    ATARAX    180 tablet    Take 1 tablet (25 mg) by mouth 2 times daily    Itching, Anxiety       lactulose 10 GM/15ML solution    CHRONULAC    946 mL    Take 15 mLs (10 g) by mouth daily    Hepatic encephalopathy (H)       levothyroxine 88 MCG tablet    SYNTHROID/LEVOTHROID    90 tablet    Take 1 tablet (88 mcg) by mouth daily    Hypothyroidism       lidocaine 4 % Crea cream    LMX4    133 g    Apply topically once as needed for mild pain    Port catheter in place        methylphenidate 10 MG tablet    RITALIN    60 tablet    10mg twice a day    Major depressive disorder with single episode, in partial remission (H)       mirtazapine 45 MG tablet    REMERON    30 tablet    Take 1 tablet (45 mg) by mouth At Bedtime    Major depressive disorder with single episode, in partial remission (H)       multivitamin, therapeutic with minerals Tabs tablet      Take 1 tablet by mouth 2 times daily        mupirocin 2 % ointment    BACTROBAN    22 g    Use in nose 2-3 times per week    Epistaxis       omeprazole 20 MG CR capsule    priLOSEC    360 capsule    Take 2 capsules (40 mg) by mouth 2 times daily    Gastroesophageal reflux disease without esophagitis       ONETOUCH LANCETS Misc     100 each    by In Vitro route 4 times daily as needed    Type II or unspecified type diabetes mellitus without mention of complication, not stated as uncontrolled       phenytoin 30 MG CR capsule    DILANTIN    720 capsule    Take 4 capsules (120 mg) by mouth 2 times daily    Oligoastrocytoma of parietal lobe (H)       pravastatin 80 MG tablet    PRAVACHOL    90 tablet    Take 1 tablet (80 mg) by mouth daily    High cholesterol       simethicone 80 MG chewable tablet    MYLICON    100 tablet    Take 1 tablet (80 mg) by mouth every 6 hours as needed for cramping    Abdominal cramping       spironolactone 25 MG tablet    ALDACTONE    180 tablet    Take 2 tablets (50 mg) by mouth daily    Other ascites       sulfamethoxazole-trimethoprim 800-160 MG per tablet    BACTRIM DS/SEPTRA DS    30 tablet    Take 1 tablet by mouth daily    Spontaneous bacterial peritonitis (H)       thiamine 100 MG tablet     100 tablet    Take 1 tablet (100 mg) by mouth daily    Alcoholic cirrhosis of liver with ascites (H)       traZODone 50 MG tablet    DESYREL    30 tablet    Take 1 tablet (50 mg) by mouth nightly as needed for sleep    Primary insomnia       XIFAXAN 550 MG Tabs tablet   Generic drug:  rifaximin     60 tablet    TAKE  ONE TABLET BY MOUTH TWICE DAILY    Hepatic encephalopathy (H)       * Notice:  This list has 2 medication(s) that are the same as other medications prescribed for you. Read the directions carefully, and ask your doctor or other care provider to review them with you.

## 2018-07-02 NOTE — NURSING NOTE
Chief Complaint   Patient presents with     RECHECK     Mild episode of recurrent major depressive disorder

## 2018-07-10 NOTE — TELEPHONE ENCOUNTER
Endoscopy dept wants me to send in basket message to Dr. Kelly. Therefore I sent the message to him.

## 2018-07-10 NOTE — TELEPHONE ENCOUNTER
Thanks can you alert the nurse for the GI MD who is doing his EGD tomorrow they would want to know, might affect plans

## 2018-07-10 NOTE — TELEPHONE ENCOUNTER
Phone call received from [facility] [phone] from Jodi with a critical value of platelets 40. Will inform RN of this.  Devyn Calles LPN at 2:08 PM on 7/10/2018'

## 2018-07-11 NOTE — OR NURSING
Dr Kelly at bedside, states pt does not need platelets in preop and that he spoke with the [facility] that platelets are to be given in the OR.

## 2018-07-11 NOTE — BRIEF OP NOTE
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 EGD for melena on 1/19/18 showed grade 3                        varices s/p banding. EGD with further banding on                        2/12/18, 3/5/18 AND 4/16/18, 5/30/18. Upper endoscopy                        for variceal surveillance and for re-banding                        as needed.   Providers:           Guru Kelly   Referring MD:        Beatriz Tanner MD   Complications:       No immediate complications.   _______________________________________________________________________________   Procedure:           Pre-Anesthesia Assessment:                        - Prior to the procedure, a History and Physical was                        performed, and patient medications and allergies were                        reviewed. The patient is competent. The risks and                        benefits of the procedure and the sedation options and                        risks were discussed with the patient. All questions                        were answered and informed consent was obtained. Patient                        identification and proposed procedure were verified by                        the physician and the nurse in the pre-procedure area.                        Mental Status Examination: alert and oriented. Airway                        Examination: normal oropharyngeal airway and neck                        mobility. Respiratory Examination: clear to                        auscultation. CV  Examination: normal. Prophylactic                        Antibiotics: The patient does not require prophylactic                        antibiotics. Prior Anticoagulants: The patient has taken                        no previous anticoagulant or antiplatelet agents. ASA                        Grade Assessment: II - A patient with mild systemic                        disease. After reviewing the risks and benefits, the                        patient was deemed in satisfactory condition to undergo                        the procedure. The anesthesia plan was to use general                        anesthesia. Immediately prior to administration of                        medications, the patient was re-assessed for adequacy to                        receive sedatives. The heart rate, respiratory rate,                        oxygen saturations, blood pressure, adequacy of                        pulmonary ventilation, and response to care were                        monitored throughout the procedure. The physical status                        of the patient was re-assessed after the procedure.                        After obtaining informed consent, the endoscope was                        passed under direct vision. Throughout the procedure,                        the patient's blood pressure, pulse, and oxygen                        saturations were monitored continuously. The Endoscope                        was introduced through the mouth, and advanced to the                        second part of duodenum. The upper GI endoscopy was                        accomplished without difficulty. The patient tolerated                        the procedure well.                                                                                     Findings:        Grade II varices were found in the lower third of the esophagus. They        were medium in size. Two bands were successfully placed with complete        eradication,  resulting in deflation of varices. Stigmata of recent        bleeding were evident and red ruthy signs were present. Stigmata of prior        treatment were evident. Evidence of partial eradication/scarring was        visible. There was no bleeding during, and at the end, of the procedure.        Moderate portal hypertensive gastropathy was found in the entire        examined stomach. No gastric varices. Polypoid lesion seen in the fundus        which was oozing on bleeding        The examined duodenum was normal.                                                                                     Impression:          Grade II esophageal varices with stigmata of prior                        treatment and red ruthy signs which appears improved                        comnpared to prior endoscopies. 2 Bands placed. Post                        banding scarring was seen.                        Portal hypertensive gastropathy.                        No gastric varices seen.                        Normal examined duodenum   Recommendation:      - Observe patient in same day observation unit for                        ongoing care.                        -Repeat upper endoscopy in 12 weeks in the OR (based on                        his comorbidities) for variceal surveillance.                        - Will recommend follow up in Hepatology clinic as                        previously scheduled

## 2018-07-11 NOTE — ANESTHESIA POSTPROCEDURE EVALUATION
Patient: Mono Varghese    Procedure(s):   with banding of esophageal varices - Wound Class: II-Clean Contaminated    Diagnosis:Esophageal Varices   Diagnosis Additional Information: No value filed.    Anesthesia Type:  General    Note:  Anesthesia Post Evaluation    Patient location during evaluation: PACU  Patient participation: Able to fully participate in evaluation  Level of consciousness: awake  Pain management: adequate  Airway patency: patent  Cardiovascular status: acceptable  Respiratory status: acceptable  Hydration status: acceptable  PONV: none     Anesthetic complications: None          Last vitals:  Vitals:    07/11/18 0755   BP: 132/90   Resp: 16   Temp: 36.6  C (97.8  F)   SpO2: 100%         Electronically Signed By: Chaparro Granda MD  July 11, 2018  10:13 AM

## 2018-07-11 NOTE — ANESTHESIA CARE TRANSFER NOTE
Patient: Mono Varghese    Procedure(s):   with banding of esophageal varices - Wound Class: II-Clean Contaminated    Diagnosis: Esophageal Varices   Diagnosis Additional Information: No value filed.    Anesthesia Type:   General     Note:  Airway :Face Mask  Patient transferred to:PACU  Handoff Report: Identifed the Patient, Identified the Reponsible Provider, Reviewed the pertinent medical history, Discussed the surgical course, Reviewed Intra-OP anesthesia mangement and issues during anesthesia, Set expectations for post-procedure period and Allowed opportunity for questions and acknowledgement of understanding      Vitals: (Last set prior to Anesthesia Care Transfer)    CRNA VITALS  7/11/2018 0932 - 7/11/2018 1011      7/11/2018             Resp Rate (observed): (!)  1                Electronically Signed By: TIESHA Childress CRNA  July 11, 2018  10:11 AM

## 2018-07-11 NOTE — IP AVS SNAPSHOT
MRN:[id number]                      After Visit Summary   7/11/2018    Mono Varghese    MRN: [id number]           Thank you!     Thank you for choosing [facility] for your care. Our goal is always to provide you with excellent care. Hearing back from our patients is one way we can continue to improve our services. Please take a few minutes to complete the written survey that you may receive in the mail after you visit with us. Thank you!        Patient Information     Date Of Birth          1970        About your hospital stay     You were admitted on:  July 11, 2018 You last received care in the:  Same Day Surgery [facility]    You were discharged on:  July 11, 2018       Who to Call     For medical emergencies, please call 911.  For non-urgent questions about your medical care, please call your primary care provider or clinic, [phone]  For questions related to your surgery, please call your surgery clinic        Attending Provider     Provider Guru Jose So MD Gastroenterology       Primary Care Provider Office Phone # Fax #    [place] [phone]      After Care Instructions     Discharge Instructions       Resume pre procedure diet            Discharge Instructions       Restart home medications.                  Your next 10 appointments already scheduled     Aug 01, 2018  7:30 AM CDT   Lab with  LAB   [facility] ([facility])    [address] [place] [address]  1st Floor  [facility] [id number] [phone]            Aug 01, 2018  8:30 AM CDT   (Arrive by 8:15 AM)   Return General Liver with Beatriz Tanner MD   [facility] Hepatology ([facility])    [address] [place] [address]  [address]  [facility] [id number] [phone]            Sep 04, 2018 12:30 PM CDT   (Arrive by 12:15 PM)   Return Seizure with Anil English MD   [facility] Neurology ([facility]  [facility])    [address]  3rd Floor  [facility] [address]   [phone]            Oct 02, 2018 10:00 AM CDT   Adult Med Follow UP with Leroy Alcaraz MD   [facility] ([facility])    [address]  [address] [address]   [phone]            Apr 26, 2019 10:00 AM CDT   MR BRAIN W/O & W CONTRAST with [address] MRI ([facility])    [address]  1st Floor  [facility] [address]   [phone]           Take your medicines as usual, unless your doctor tells you not to. Bring a list of your current medicines to your exam (including vitamins, minerals and over-the-counter drugs).  You may or may not receive intravenous (IV) contrast for this exam pending the discretion of the Radiologist.  You do not need to do anything special to prepare.  The MRI machine uses a strong magnet. Please wear clothes without metal (snaps, zippers). A sweatsuit works well, or we may give you a hospital gown.  Please remove any body piercings and hair extensions before you arrive. You will also remove watches, jewelry, hairpins, wallets, dentures, partial dental plates and hearing aids. You may wear contact lenses, and you may be able to wear your rings. We have a safe place to keep your personal items, but it is safer to leave them at home.  **IMPORTANT** THE INSTRUCTIONS BELOW ARE ONLY FOR THOSE PATIENTS WHO HAVE BEEN PRESCRIBED SEDATION OR GENERAL ANESTHESIA DURING THEIR MRI PROCEDURE:  IF YOUR DOCTOR PRESCRIBED ORAL SEDATION (take medicine to help you relax during your exam):   You must get the medicine from your doctor (oral medication) before you arrive. Bring the medicine to the exam. Do not take it at home. You ll be told when to take it upon arriving for your exam.   Arrive one hour early. Bring someone who can take you home after the test. Your medicine will make you sleepy.  After the exam, you may not drive, take a bus or take a taxi by yourself.  IF YOUR DOCTOR PRESCRIBED IV SEDATION:   Arrive one hour early. Bring someone who can take you home after the test. Your medicine will make you sleepy. After the exam, you may not drive, take a bus or take a taxi by yourself.   No eating 6 hours before your exam. You may have clear liquids up until 4 hours before your exam. (Clear liquids include water, clear tea, black coffee and fruit juice without pulp.)  IF YOUR DOCTOR PRESCRIBED ANESTHESIA (be asleep for your exam):   Arrive 1 1/2 hours early. Bring someone who can take you home after the test. You may not drive, take a bus or take a taxi by yourself.   No eating 8 hours before your exam. You may have clear liquids up until 4 hours before your exam. (Clear liquids include water, clear tea, black coffee and fruit juice without pulp.)   You will spend four to five hours in the recovery room.  Please call the Imaging Department at your exam site with any questions.            May 03, 2019 10:45 AM CDT   (Arrive by 10:30 AM)   Return Visit with Lauro Shaw MD   [facility] ([facility])    [address]  [address] [place] [address] [phone]              Further instructions from your care team       [facility]  Same-Day Surgery   Adult Discharge Orders & Instructions     For 24 hours after surgery    1. Get plenty of rest.  A responsible adult must stay with you for at least 24 hours after you leave the hospital.   2. Do not drive or use heavy equipment.  If you have weakness or tingling, don't drive or use heavy equipment until this feeling goes away.  3. Do not drink alcohol.  4. Avoid strenuous or risky activities.  Ask for help when climbing stairs.   5. You may feel lightheaded.  IF so, sit for a few minutes before standing.  Have someone help you get up.   6. If you have nausea (feel  "sick to your stomach): Drink only clear liquids such as apple juice, ginger ale, broth or 7-Up.  Rest may also help.  Be sure to drink enough fluids.  Move to a regular diet as you feel able.  7. You may have a slight fever. Call the doctor if your fever is over 100 F (37.7 C) (taken under the tongue) or lasts longer than 24 hours.  8. You may have a dry mouth, a sore throat, muscle aches or trouble sleeping.  These should go away after 24 hours.  9. Do not make important or legal decisions.   Call your doctor for any of the followin.  Signs of infection (fever, growing tenderness at the surgery site, a large amount of drainage or bleeding, severe pain, foul-smelling drainage, redness, swelling).    2. It has been over 8 to 10 hours since surgery and you are still not able to urinate (pass water).    3.  Headache for over 24 hours.    4.  Numbness, tingling or weakness the day after surgery (if you had spinal anesthesia).  To contact a doctor, call         [phone] and ask for the resident on call for  GI service  (answered 24 hours a day)  OR      Emergency Department:    [facility]: [phone]       (TTY for hearing impaired: [phone])            Pending Results     No orders found from 2018 to 2018.            Admission Information     Date & Time Provider Department Dept. Phone    2018 Guru Jose Kelly MD Same Day Surgery [facility] [phone]      Your Vitals Were     Blood Pressure Temperature Respirations Height Weight Pulse Oximetry    111/75 97.7  F (36.5  C) (Oral) 16 1.778 m (5' 10\") 86.5 kg (190 lb 11.2 oz) 100%    BMI (Body Mass Index)                   27.36 kg/m2           [employer] Information     Care [facility] Connect gives you secure access to your electronic health record. If you see a primary care provider, you can also send messages to your care team and make appointments. If you have questions, please call your primary care clinic.  If you do not " have a primary care provider, please call [phone] and they will assist you.        Care EveryWhere ID     This is your Care EveryWhere ID. This could be used by other organizations to access your [place] medical records  [id number]        Equal Access to Services     SAMUEL FAIR : Hadii aad ku hadhectorrupal Martha, waaniada luqadaha, qaybta kajie melindalyly, josue sol fangbrian calderonseradenise tamayo. So [facility] [phone].    ATENCIÓN: Si habla español, tiene a chinag disposición servicios gratuitos de asistencia lingüística. Llame al [phone].    We comply with applicable federal civil rights laws and [place] laws. We do not discriminate on the basis of race, color, national origin, age, disability, sex, sexual orientation, or gender identity.               Review of your medicines      UNREVIEWED medicines. Ask your doctor about these medicines        Dose / Directions    Calcium Carb-Cholecalciferol 500-400 MG-UNIT Tabs   Commonly known as:  calcium 500 +D   Used for:  Malnutrition (H)        Dose:  500 mg   Take 500 mg by mouth daily   Quantity:  90 tablet   Refills:  1       ciclopirox 0.77 % cream   Commonly known as:  LOPROX   Used for:  Tinea pedis of both feet        Apply topically 2 times daily To feet and toenails.   Quantity:  90 g   Refills:  4       cyanocobalamin 1000 MCG tablet   Commonly known as:  vitamin  B-12   Used for:  Alcoholic cirrhosis of liver with ascites (H)        Dose:  1000 mcg   1 tablet (1,000 mcg) by Per G Tube route daily   Quantity:  130 tablet   Refills:  2       dulaglutide 1.5 MG/0.5ML pen   Commonly known as:  TRULICITY   Used for:  DM type 2, goal HbA1c 7%-8% (H)        Dose:  1.5 mg   Inject 1.5 mg Subcutaneous every 7 days   Quantity:  6 mL   Refills:  1       ferrous sulfate 325 (65 Fe) MG tablet   Commonly known as:  IRON   Used for:  Other iron deficiency anemia        Dose:  1 tablet   Take 1 tablet (325 mg) by mouth 2 times daily   Quantity:  200 tablet    Refills:  3       furosemide 20 MG tablet   Commonly known as:  LASIX   Used for:  Other ascites        Dose:  20 mg   Take 1 tablet (20 mg) by mouth daily   Quantity:  30 tablet   Refills:  3       * gabapentin 100 MG capsule   Commonly known as:  NEURONTIN   Used for:  Mild episode of recurrent major depressive disorder (H)        Dose:  100 mg   Take 1 capsule (100 mg) by mouth At Bedtime   Quantity:  30 capsule   Refills:  3       * gabapentin 100 MG capsule   Commonly known as:  NEURONTIN   Used for:  Mild episode of recurrent major depressive disorder (H)        TAKE ONE CAPSULE BY MOUTH AT BEDTIME   Quantity:  90 capsule   Refills:  0       HYDROcodone-acetaminophen 5-325 MG per tablet   Commonly known as:  NORCO   Used for:  Brain tumor (H)        Dose:  2 tablet   Take 2 tablets by mouth every 6 hours as needed for moderate to severe pain   Quantity:  60 tablet   Refills:  0       hydrOXYzine 25 MG tablet   Commonly known as:  ATARAX   Used for:  Itching, Anxiety        Dose:  25 mg   Take 1 tablet (25 mg) by mouth 2 times daily   Quantity:  180 tablet   Refills:  0       lactulose 10 GM/15ML solution   Commonly known as:  CHRONULAC   Used for:  Hepatic encephalopathy (H)        Dose:  10 g   Take 15 mLs (10 g) by mouth daily   Quantity:  946 mL   Refills:  3       levothyroxine 88 MCG tablet   Commonly known as:  SYNTHROID/LEVOTHROID   Used for:  Hypothyroidism        Dose:  88 mcg   Take 1 tablet (88 mcg) by mouth daily   Quantity:  90 tablet   Refills:  1       lidocaine 4 % Crea cream   Commonly known as:  LMX4   Used for:  Port catheter in place        Apply topically once as needed for mild pain   Quantity:  133 g   Refills:  1       * methylphenidate 10 MG tablet   Commonly known as:  RITALIN   Used for:  Major depressive disorder with single episode, in partial remission (H)        10mg twice a day   Quantity:  60 tablet   Refills:  0       * methylphenidate 10 MG tablet   Commonly known as:   RITALIN   Used for:  Attention deficit hyperactivity disorder (ADHD), unspecified ADHD type        Dose:  10 mg   Start taking on:  7/12/2018   Take 1 tablet (10 mg) by mouth 2 times daily   Quantity:  60 tablet   Refills:  0       * methylphenidate 10 MG tablet   Commonly known as:  RITALIN   Used for:  Attention deficit hyperactivity disorder (ADHD), unspecified ADHD type        Dose:  10 mg   Start taking on:  8/12/2018   Take 1 tablet (10 mg) by mouth 2 times daily   Quantity:  60 tablet   Refills:  0       * methylphenidate 10 MG tablet   Commonly known as:  RITALIN   Used for:  Attention deficit hyperactivity disorder (ADHD), unspecified ADHD type        Dose:  10 mg   Start taking on:  9/12/2018   Take 1 tablet (10 mg) by mouth 2 times daily   Quantity:  60 tablet   Refills:  0       mirtazapine 45 MG tablet   Commonly known as:  REMERON   Used for:  Major depressive disorder with single episode, in partial remission (H)        Dose:  45 mg   Take 1 tablet (45 mg) by mouth At Bedtime   Quantity:  30 tablet   Refills:  2       multivitamin, therapeutic with minerals Tabs tablet        Dose:  1 tablet   Take 1 tablet by mouth 2 times daily   Refills:  0       mupirocin 2 % ointment   Commonly known as:  BACTROBAN   Used for:  Epistaxis        Use in nose 2-3 times per week   Quantity:  22 g   Refills:  0       omeprazole 20 MG CR capsule   Commonly known as:  priLOSEC   Used for:  Gastroesophageal reflux disease without esophagitis        Dose:  40 mg   Take 2 capsules (40 mg) by mouth 2 times daily   Quantity:  360 capsule   Refills:  3       phenytoin 30 MG CR capsule   Commonly known as:  DILANTIN   Used for:  Oligoastrocytoma of parietal lobe (H)        Dose:  120 mg   Take 4 capsules (120 mg) by mouth 2 times daily   Quantity:  720 capsule   Refills:  11       pravastatin 80 MG tablet   Commonly known as:  PRAVACHOL   Used for:  High cholesterol        Dose:  80 mg   Take 1 tablet (80 mg) by mouth daily    Quantity:  90 tablet   Refills:  3       spironolactone 25 MG tablet   Commonly known as:  ALDACTONE   Used for:  Other ascites        Dose:  50 mg   Take 2 tablets (50 mg) by mouth daily   Quantity:  180 tablet   Refills:  1       thiamine 100 MG tablet   Used for:  Alcoholic cirrhosis of liver with ascites (H)        Dose:  100 mg   Take 1 tablet (100 mg) by mouth daily   Quantity:  100 tablet   Refills:  1       traZODone 50 MG tablet   Commonly known as:  DESYREL   Used for:  Primary insomnia        Dose:  50 mg   Take 1 tablet (50 mg) by mouth nightly as needed for sleep   Quantity:  30 tablet   Refills:  2       XIFAXAN 550 MG Tabs tablet   Used for:  Hepatic encephalopathy (H)   Generic drug:  rifaximin        TAKE ONE TABLET BY MOUTH TWICE DAILY   Quantity:  60 tablet   Refills:  11       * Notice:  This list has 6 medication(s) that are the same as other medications prescribed for you. Read the directions carefully, and ask your doctor or other care provider to review them with you.      CONTINUE these medicines which have NOT CHANGED        Dose / Directions    blood glucose monitoring test strip   Commonly known as:  ONETOUCH ULTRA   Used for:  Diabetes mellitus, type 2 (H)        Use to test blood sugars 4 times daily as needed or as directed.   Quantity:  400 each   Refills:  1       CANE, ANY MATERIAL   Used for:  Balance disorder        One cane   Quantity:  1 each   Refills:  0       ONETOUCH LANCETS Misc   Used for:  Type II or unspecified type diabetes mellitus without mention of complication, not stated as uncontrolled        by In Vitro route 4 times daily as needed   Quantity:  100 each   Refills:  prn                Protect others around you: Learn how to safely use, store and throw away your medicines at www.disposemymeds.org.             Medication List: This is a list of all your medications and when to take them. Check marks below indicate your daily home schedule. Keep this list as a  reference.      Medications           Morning Afternoon Evening Bedtime As Needed    blood glucose monitoring test strip   Commonly known as:  ONETOUCH ULTRA   Use to test blood sugars 4 times daily as needed or as directed.                                Calcium Carb-Cholecalciferol 500-400 MG-UNIT Tabs   Commonly known as:  calcium 500 +D   Take 500 mg by mouth daily                                CANE, ANY MATERIAL   One cane                                ciclopirox 0.77 % cream   Commonly known as:  LOPROX   Apply topically 2 times daily To feet and toenails.                                cyanocobalamin 1000 MCG tablet   Commonly known as:  vitamin  B-12   1 tablet (1,000 mcg) by Per G Tube route daily                                dulaglutide 1.5 MG/0.5ML pen   Commonly known as:  TRULICITY   Inject 1.5 mg Subcutaneous every 7 days                                ferrous sulfate 325 (65 Fe) MG tablet   Commonly known as:  IRON   Take 1 tablet (325 mg) by mouth 2 times daily                                furosemide 20 MG tablet   Commonly known as:  LASIX   Take 1 tablet (20 mg) by mouth daily                                * gabapentin 100 MG capsule   Commonly known as:  NEURONTIN   Take 1 capsule (100 mg) by mouth At Bedtime                                * gabapentin 100 MG capsule   Commonly known as:  NEURONTIN   TAKE ONE CAPSULE BY MOUTH AT BEDTIME                                HYDROcodone-acetaminophen 5-325 MG per tablet   Commonly known as:  NORCO   Take 2 tablets by mouth every 6 hours as needed for moderate to severe pain                                hydrOXYzine 25 MG tablet   Commonly known as:  ATARAX   Take 1 tablet (25 mg) by mouth 2 times daily                                lactulose 10 GM/15ML solution   Commonly known as:  CHRONULAC   Take 15 mLs (10 g) by mouth daily                                levothyroxine 88 MCG tablet   Commonly known as:  SYNTHROID/LEVOTHROID   Take 1 tablet  (88 mcg) by mouth daily                                lidocaine 4 % Crea cream   Commonly known as:  LMX4   Apply topically once as needed for mild pain                                * methylphenidate 10 MG tablet   Commonly known as:  RITALIN   10mg twice a day                                * methylphenidate 10 MG tablet   Commonly known as:  RITALIN   Take 1 tablet (10 mg) by mouth 2 times daily   Start taking on:  7/12/2018                                * methylphenidate 10 MG tablet   Commonly known as:  RITALIN   Take 1 tablet (10 mg) by mouth 2 times daily   Start taking on:  8/12/2018                                * methylphenidate 10 MG tablet   Commonly known as:  RITALIN   Take 1 tablet (10 mg) by mouth 2 times daily   Start taking on:  9/12/2018                                mirtazapine 45 MG tablet   Commonly known as:  REMERON   Take 1 tablet (45 mg) by mouth At Bedtime                                multivitamin, therapeutic with minerals Tabs tablet   Take 1 tablet by mouth 2 times daily                                mupirocin 2 % ointment   Commonly known as:  BACTROBAN   Use in nose 2-3 times per week                                omeprazole 20 MG CR capsule   Commonly known as:  priLOSEC   Take 2 capsules (40 mg) by mouth 2 times daily                                ONETOUCH LANCETS Misc   by In Vitro route 4 times daily as needed                                phenytoin 30 MG CR capsule   Commonly known as:  DILANTIN   Take 4 capsules (120 mg) by mouth 2 times daily                                pravastatin 80 MG tablet   Commonly known as:  PRAVACHOL   Take 1 tablet (80 mg) by mouth daily                                spironolactone 25 MG tablet   Commonly known as:  ALDACTONE   Take 2 tablets (50 mg) by mouth daily                                thiamine 100 MG tablet   Take 1 tablet (100 mg) by mouth daily                                traZODone 50 MG tablet   Commonly known as:   DESYREL   Take 1 tablet (50 mg) by mouth nightly as needed for sleep                                XIFAXAN 550 MG Tabs tablet   TAKE ONE TABLET BY MOUTH TWICE DAILY   Generic drug:  rifaximin                                * Notice:  This list has 6 medication(s) that are the same as other medications prescribed for you. Read the directions carefully, and ask your doctor or other care provider to review them with you.

## 2018-07-11 NOTE — ANESTHESIA PREPROCEDURE EVALUATION
Anesthesia Evaluation     . Pt has had prior anesthetic. Type: General    No history of anesthetic complications          ROS/MED HX    ENT/Pulmonary:       Neurologic:       Cardiovascular:         METS/Exercise Tolerance:     Hematologic:         Musculoskeletal:         GI/Hepatic:         Renal/Genitourinary:         Endo:         Psychiatric:         Infectious Disease:         Malignancy:         Other:                     Physical Exam  Normal systems: cardiovascular, pulmonary and dental    Airway   Mallampati: I  TM distance: >3 FB  Neck ROM: full    Dental     Cardiovascular       Pulmonary                     Anesthesia Plan      History & Physical Review      ASA Status:  4 .    NPO Status:  > 6 hours    Plan for General with Intravenous induction. Maintenance will be Balanced.    PONV prophylaxis:  Ondansetron (or other 5HT-3)       Postoperative Care  Postoperative pain management:  IV analgesics.      Consents  Anesthetic plan, risks, benefits and alternatives discussed with:  Patient.  Use of blood products discussed: No .   .                      ANESTHESIA PREOP EVALUATION    NPO Status: NPO per Anesthesia Guidelines for Procedure/Surgery Except for: Meds    Procedure: egd with banding of esopageal varices    HPI: liver failure     PMHx/PSHx/ROS:  PAST MEDICAL HISTORY:   Past Medical History:   Diagnosis Date     ADHD      Alcoholic cirrhosis of liver with ascites (H) 06/17/2015    cirrhosis secondary to alcohol, complicated by variceal bleeding and hepatic encephalopathy      Ascites due to alcoholic cirrhosis (H)     Requiring regular paracentesis.     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 status post subtotal resection by Dr. J Carlos Aranda in early 06/2013. Negative for 1p/19q deletions. S/P Concurrent chemoradiation July 15 through August 23, 2013. Initiated adjuvant oral temozolomide 9/17/13; switch     H/O LENA (obstructive sleep apnea)-moderate 12/18/2012    Pt states this is not currently an issue.     Hyperlipidemia LDL goal <100 8/1/2016     Hypothyroidism 8/1/2016     Liver failure (H)      Moderate major depression (H) 10/3/2012     Obesity      Pancytopenia (H) 8/1/2016    Chronic pancytopenia secondary to liver disease since at least 2012      Partial epilepsy with impairment of consciousness (H) 05/07/2014     Portal hypertensive gastropathy      Portal vein thrombosis 12/18/2015    history of left lower extremity deep vein thrombosis as well as portal vein and superior mesenteric vein thrombosis, late 2015 when he was hospitalized in the ICU and seriously ill temporary IVC filter placed in 12/2015 when he was in the ICU with deep vein thromboses and active bleeding      Pulmonary nodules 6/17/2015     Rectal bleeding      Type 2 diabetes mellitus (H) 10/3/2012       PAST SURGICAL HISTORY:   Past Surgical History:   Procedure Laterality Date     COLONOSCOPY N/A 11/27/2015    Procedure: COMBINED COLONOSCOPY, SINGLE OR MULTIPLE BIOPSY/POLYPECTOMY BY BIOPSY;  Surgeon: Ran Thurston MD;  Location:  GI     ESOPHAGOSCOPY, GASTROSCOPY, DUODENOSCOPY (EGD), COMBINED N/A 11/23/2015    Procedure: COMBINED ESOPHAGOSCOPY, GASTROSCOPY, DUODENOSCOPY (EGD);  Surgeon: Rocael Rizvi MD;  Location:   OR     ESOPHAGOSCOPY, GASTROSCOPY, DUODENOSCOPY (EGD), COMBINED N/A 1/25/2017    Procedure: COMBINED ESOPHAGOSCOPY, GASTROSCOPY, DUODENOSCOPY (EGD), BIOPSY SINGLE OR MULTIPLE;  Surgeon: Rocael Tejada MD;  Location: [facility] GI     ESOPHAGOSCOPY, GASTROSCOPY, DUODENOSCOPY (EGD), COMBINED N/A 3/30/2017    Procedure: COMBINED ESOPHAGOSCOPY, GASTROSCOPY, DUODENOSCOPY (EGD);  Surgeon: Jordana Ramirez MD;  Location: [facility] GI     ESOPHAGOSCOPY, GASTROSCOPY, DUODENOSCOPY (EGD), COMBINED N/A 1/19/2018    Procedure: COMBINED ESOPHAGOSCOPY, GASTROSCOPY, DUODENOSCOPY (EGD);  Upper Endoscopy with verceal banding; Flexible Sigmoidoscopy;  Surgeon: Guru Jose Kelly MD;  Location: [facility] OR     ESOPHAGOSCOPY, GASTROSCOPY, DUODENOSCOPY (EGD), COMBINED N/A 2/12/2018    Procedure: COMBINED ESOPHAGOSCOPY, GASTROSCOPY, DUODENOSCOPY (EGD);  Esophagogastroduodenoscopy with variceal banding;  Surgeon: Guru Jose Kelly MD;  Location: [facility] OR     ESOPHAGOSCOPY, GASTROSCOPY, DUODENOSCOPY (EGD), COMBINED N/A 3/5/2018    Procedure: COMBINED ESOPHAGOSCOPY, GASTROSCOPY, DUODENOSCOPY (EGD);  Esophagogastroduodenoscopy  with banding of varices Latex Allergy ;  Surgeon: Guru Jose Kelly MD;  Location: [facility] OR     ESOPHAGOSCOPY, GASTROSCOPY, DUODENOSCOPY (EGD), COMBINED N/A 4/16/2018    Procedure: COMBINED ESOPHAGOSCOPY, GASTROSCOPY, DUODENOSCOPY (EGD);  Upper Endoscopy  with esophageal varices banding; Latex Allergy ;  Surgeon: Guru Jose Kelly MD;  Location: [facility]     ESOPHAGOSCOPY, GASTROSCOPY, DUODENOSCOPY (EGD), COMBINED N/A 5/30/2018    Procedure: COMBINED ESOPHAGOSCOPY, GASTROSCOPY, DUODENOSCOPY (EGD);  upper endoscopy with banding of esophageal varices. *latex Allergy*;  Surgeon: Guru Jose Kelly MD;  Location: [facility] OR     OPTICAL TRACKING SYSTEM CRANIOTOMY, EXCISE TUMOR, COMBINED  6/6/2013    Procedure: COMBINED OPTICAL TRACKING SYSTEM  CRANIOTOMY, EXCISE TUMOR;  Left Stealth Guided Craniotomy , Tumor Resection ;  Surgeon: J Carlos Aranda MD;  Location: [facility] OR     ORTHOPEDIC SURGERY      hand surgery- right     SIGMOIDOSCOPY FLEXIBLE N/A 11/24/2015    Procedure: SIGMOIDOSCOPY FLEXIBLE;  Surgeon: Rocael Rizvi MD;  Location: [facility]     SIGMOIDOSCOPY FLEXIBLE N/A 1/19/2018    Procedure: SIGMOIDOSCOPY FLEXIBLE;;  Surgeon: Guru Jose Kelly MD;  Location: [facility] OR       FAMILY HISTORY:   Family History   Problem Relation Age of Onset     Adopted: Yes     Medical History Unknown Mother      Cirrhosis Father      Medical History Unknown Brother      Medical History Unknown Brother      Medical History Unknown Brother          Past Anes Hx: No personal or family h/o anesthesia problems    Soc Hx:   Tobacco:   EtOH: past    Allergies:   Allergies   Allergen Reactions     Latex Itching and Rash     No Clinical Screening - See Comments      Coban and Surgilast cause itching     Tegaderm Transparent Dressing (Informational Only)        Meds:   Prescriptions Prior to Admission   Medication Sig Dispense Refill Last Dose     blood glucose monitoring (ONETOUCH ULTRA) test strip Use to test blood sugars 4 times daily as needed or as directed. 400 each 1 Taking     Calcium Carb-Cholecalciferol (CALCIUM 500 +D) 500-400 MG-UNIT TABS Take 500 mg by mouth daily 90 tablet 1 Taking     CANE, ANY MATERIAL One cane 1 each 0 Taking     ciclopirox (LOPROX) 0.77 % cream Apply topically 2 times daily To feet and toenails. 90 g 4 Taking     cyanocobalamin (VITAMIN  B-12) 1000 MCG tablet 1 tablet (1,000 mcg) by Per G Tube route daily (Patient taking differently: 1,000 mcg by Per G Tube route every morning ) 130 tablet 2 Taking     dulaglutide (TRULICITY) 1.5 MG/0.5ML pen Inject 1.5 mg Subcutaneous every 7 days 6 mL 1 Taking     ferrous sulfate (IRON) 325 (65 FE) MG tablet Take 1 tablet (325 mg) by mouth 2 times daily 200 tablet 3 Taking     furosemide  (LASIX) 20 MG tablet Take 1 tablet (20 mg) by mouth daily 30 tablet 3 Taking     gabapentin (NEURONTIN) 100 MG capsule TAKE ONE CAPSULE BY MOUTH AT BEDTIME 90 capsule 0 Taking     gabapentin (NEURONTIN) 100 MG capsule Take 1 capsule (100 mg) by mouth At Bedtime 30 capsule 3 Taking     HYDROcodone-acetaminophen (NORCO) 5-325 MG per tablet Take 2 tablets by mouth every 6 hours as needed for moderate to severe pain 60 tablet 0 Taking     hydrOXYzine (ATARAX) 25 MG tablet Take 1 tablet (25 mg) by mouth 2 times daily 180 tablet 0 Taking     lactulose (CHRONULAC) 10 GM/15ML solution Take 15 mLs (10 g) by mouth daily 946 mL 3 Taking     levothyroxine (SYNTHROID/LEVOTHROID) 88 MCG tablet Take 1 tablet (88 mcg) by mouth daily 90 tablet 1 Taking     lidocaine (LMX4) 4 % CREA cream Apply topically once as needed for mild pain 133 g 1 Taking     [START ON 7/12/2018] methylphenidate (RITALIN) 10 MG tablet Take 1 tablet (10 mg) by mouth 2 times daily 60 tablet 0      [START ON 8/12/2018] methylphenidate (RITALIN) 10 MG tablet Take 1 tablet (10 mg) by mouth 2 times daily 60 tablet 0      [START ON 9/12/2018] methylphenidate (RITALIN) 10 MG tablet Take 1 tablet (10 mg) by mouth 2 times daily 60 tablet 0      methylphenidate (RITALIN) 10 MG tablet 10mg twice a day 60 tablet 0 Taking     mirtazapine (REMERON) 45 MG tablet Take 1 tablet (45 mg) by mouth At Bedtime 30 tablet 2      multivitamin, therapeutic with minerals (THERA-VIT-M) TABS Take 1 tablet by mouth 2 times daily   Taking     mupirocin (BACTROBAN) 2 % ointment Use in nose 2-3 times per week 22 g 0 Taking     omeprazole (PRILOSEC) 20 MG CR capsule Take 2 capsules (40 mg) by mouth 2 times daily 360 capsule 3 Taking     ONE TOUCH LANCETS MISC by In Vitro route 4 times daily as needed 100 each prn Taking     phenytoin (DILANTIN) 30 MG CR capsule Take 4 capsules (120 mg) by mouth 2 times daily 720 capsule 11 Taking     pravastatin (PRAVACHOL) 80 MG tablet Take 1 tablet (80 mg)  by mouth daily 90 tablet 3 Taking     simethicone (MYLICON) 80 MG chewable tablet Take 1 tablet (80 mg) by mouth every 6 hours as needed for cramping (Patient not taking: Reported on 6/22/2018) 100 tablet 1 Not Taking     spironolactone (ALDACTONE) 25 MG tablet Take 2 tablets (50 mg) by mouth daily 180 tablet 1 Taking     sulfamethoxazole-trimethoprim (BACTRIM DS/SEPTRA DS) 800-160 MG per tablet Take 1 tablet by mouth daily 30 tablet 5 Taking     thiamine (VITAMIN B-1) 100 MG tablet Take 1 tablet (100 mg) by mouth daily 100 tablet 1 Taking     traZODone (DESYREL) 50 MG tablet Take 1 tablet (50 mg) by mouth nightly as needed for sleep 30 tablet 2      XIFAXAN 550 MG TABS tablet TAKE ONE TABLET BY MOUTH TWICE DAILY 60 tablet 11 Taking       No current outpatient prescriptions on file.       Physical Exam:  VS: T 97.8, P Data Unavailable, /90, R 16, SpO2 100%     Airway: MP 1, TM>3FB, Neck full ROM  Dentition: no loose teeth  Heart: RRR  Lungs: CTAB      BMP:  Lab Results   Component Value Date     07/10/2018      Lab Results   Component Value Date    POTASSIUM 4.0 07/10/2018     Lab Results   Component Value Date    CHLORIDE 105 07/10/2018     Lab Results   Component Value Date    UCHE 8.2 07/10/2018     Lab Results   Component Value Date    CO2 24 07/10/2018     Lab Results   Component Value Date    BUN 12 07/10/2018     Lab Results   Component Value Date    CR 0.92 07/10/2018     Lab Results   Component Value Date     07/10/2018        CBC:  Lab Results   Component Value Date    WBC 2.0 07/10/2018     Lab Results   Component Value Date    HGB 12.6 07/10/2018     Lab Results   Component Value Date    HCT 36.4 07/10/2018     Lab Results   Component Value Date    PLT 40 07/10/2018        Coags/Type and Screen  Lab Results   Component Value Date    INR 1.69 05/30/2018     No results found for: PT  Type and Screen:      Assessment/Plan:  - ASA 4  - GETA with standard ASA monitors, IV induction, balanced  anesthetic  - PIV  - Antibiotics per surgery  - PONV prophylaxis    Chaparro Granda M.D.    7/11/2018  8:01 AM          .

## 2018-07-11 NOTE — DISCHARGE INSTRUCTIONS
[facility]  Same-Day Surgery   Adult Discharge Orders & Instructions     For 24 hours after surgery    1. Get plenty of rest.  A responsible adult must stay with you for at least 24 hours after you leave the hospital.   2. Do not drive or use heavy equipment.  If you have weakness or tingling, don't drive or use heavy equipment until this feeling goes away.  3. Do not drink alcohol.  4. Avoid strenuous or risky activities.  Ask for help when climbing stairs.   5. You may feel lightheaded.  IF so, sit for a few minutes before standing.  Have someone help you get up.   6. If you have nausea (feel sick to your stomach): Drink only clear liquids such as apple juice, ginger ale, broth or 7-Up.  Rest may also help.  Be sure to drink enough fluids.  Move to a regular diet as you feel able.  7. You may have a slight fever. Call the doctor if your fever is over 100 F (37.7 C) (taken under the tongue) or lasts longer than 24 hours.  8. You may have a dry mouth, a sore throat, muscle aches or trouble sleeping.  These should go away after 24 hours.  9. Do not make important or legal decisions.   Call your doctor for any of the followin.  Signs of infection (fever, growing tenderness at the surgery site, a large amount of drainage or bleeding, severe pain, foul-smelling drainage, redness, swelling).    2. It has been over 8 to 10 hours since surgery and you are still not able to urinate (pass water).    3.  Headache for over 24 hours.    4.  Numbness, tingling or weakness the day after surgery (if you had spinal anesthesia).  To contact a doctor, call         [phone] and ask for the resident on call for  GI service  (answered 24 hours a day)  OR      Emergency Department:    [facility]: [phone]       (TTY for hearing impaired: [phone])

## 2018-07-11 NOTE — IP AVS SNAPSHOT
Same Day Surgery [address] [address]    Phone:  [phone]                                       After Visit Summary   7/11/2018    Mono Varghese    MRN: [id number]           After Visit Summary Signature Page     I have received my discharge instructions, and my questions have been answered. I have discussed any challenges I see with this plan with the nurse or doctor.    ..........................................................................................................................................  Patient/Patient Representative Signature      ..........................................................................................................................................  Patient Representative Print Name and Relationship to Patient    ..................................................               ................................................  Date                                            Time    ..........................................................................................................................................  Reviewed by Signature/Title    ...................................................              ..............................................  Date                                                            Time

## 2018-07-13 NOTE — PROGRESS NOTES
Post Upper GI (7/11/2018) with Dr. Kelly: Follow-up    Post procedure recommendations: - Observe patient in same day observation unit for ongoing care.   -Repeat upper endoscopy in 12 weeks in the OR (based on his comorbidities) for variceal surveillance.   - Will recommend follow up in Hepatology clinic as previously scheduled     Patient states: Faby states patient is doing good. Patient denies any severe abdominal pain, nausea or emesis, fever or chills. Patient is tolerating a regular diet. Patient was instructed to call with any fever greater than 101 or chills, severe abdominal pain, nausea or vomiting, unable to tolerate a diet, dark urine, signs of dehydration, or if she notices any jaundice color to his skin.     Orders placed: Upper GI and sent to scheduling.     Contact information verified for future questions/concerns. Patent's questions and concerns were addressed to their stated satisfaction. Patient is in agreement with this new plan of care.     Vanessa HORTON RN Coordinator  Dr. Kelly  Advanced Endoscopy  [phone]

## 2018-08-01 NOTE — LETTER
8/1/2018    RE: Mono Varghese  [id number] [facility] [address]     Hepatology Follow-up Clinic note  Mono Varghese   Date of Birth 1970  Date of Service 8/1/2018    Reason for follow-up: ETOH cirrhosis          Assessment/plan:   Mono Varghese is a 47 year old male with ETOH cirrhosis complicated by ascites requiring paracentesis, hepatic encephalopathy and esophageal varices s/p banding. His encephalopathy is controlled on current regimen. No asterixis on exam.   Last HCC screening in 5/3/2018 showing two LIRADs-3 lesions. He is up to date with variceal screening with EGD and will be due for repeat EGD in 2 months due to recent banding. His MELD is 13. His liver function is relatively stable with mild elevation in bilirubin, INR and low platelets.  We discussed importance of nutrition and increasing protein in diet to maintain his functionality.     1. Hepatic Encephalopathy  - Continue rifaximin   - Continue lactulose    2. Ascites  - Paracentesis prn   - Continue lasix   -  2000 mg sodium/high protein diet    3. Hx of esophageal varices  - EGD scheduled in October 2018     4. HCC screening/History of LIRADs-3  -  Repeat MRI in 6-12 months     5. Follow-up in clinic in 6 months or sooner as needed    Casey Barone PA-C   [facility] Hepatology clinic    Mono Varghese was seen and evaluated today as apart of a shared APRN/PA visit. I reviewed today's vitals, hepatic panel, CBC and INR, MRI and recent EGD. My key exam findings include: ETOH cirrhosis with ascites. Key management decisions made by me and carried out under my direction include: Continue Rifaximin and lactulose for hepatic encephalopathy, Continue paracentesis PRN and diuretics for ascites, repeat EGD for history of recent esophageal varices and banding, repeat MRI for HCC screening in 3-6 months     Beatriz Tanner      -----------------------------------------------------       HPI:   Mono Varghese is  a 47 year old male presenting for follow-up.     ETOH Cirrhosis  Complicated by:  - Ascites, requiring paracentesis  - Hx of GI bleed  - Hx of HE  EGD: 7/11/2018, grade 2 esophageal varices s/p banding  HCC: 5/3/2018, MRI showing, 2 LIRARDs lesions    Patient was last seen by Dr. Alcazar and Dr. Tanner on 4/25/2018. No recent hospitalizations or ER visits. Recent medication changes including stopping glipizide and Cepra.  He goes to [facility] for paracentesis every 2 weeks and they usually take out 8 L. He has coughing when his fluid level is up. He had paracentesis in the past few days and only had 3L removed. Catheter fell out out half way through procedure after he went to the bathroom. He will be rescheduled for repeat paracentesis next week as well. His appetite is down and he has continued to lose weight. He has a history of oligoastrocytoma of parietal lobe and has been stable without any signs of reoccurrence. He also has history of seizures and follows with Neurology regularly. Neuropsych evaluation noting global impairment with major deficits in all cognifitive domains.     He had an EGD in August 2018.  He has multiple bowel movements a day, he has cut down on his dose of lactulose because of frequent and explosive bowel movements. He has a small amount of bright red blood in diapers. No consistent dripping or blood in stool. They deny any changes in confusion, his mental impairment  His mother-in-law has noticed more exageratted movements with hands and mouth. Patient denies lower extremity edema.  Patient also denies hematemesis. Patient denies sweats or chills.    His wife is currently in the ICU at [facility] with ARDS. His mother-in-law who is a retired NP is helping support him at this time. He also has a home health RN that fills his medications weekly. Last ETOH was 3 years ago.      Medical hx Surgical hx   Past Medical History:   Diagnosis Date     ADHD      Alcoholic cirrhosis of liver with ascites  (H) 06/17/2015     Ascites due to alcoholic cirrhosis (H)      Chronic pain 8/1/2016     Depressive disorder 02/01/2001     Encephalopathy, hepatic (H) 7/29/2017     GERD (gastroesophageal reflux disease)      GIB (gastrointestinal bleeding) 11/23/2015     H/O Oligoastrocytoma of parietal lobe (H) 06/11/2013     H/O LENA (obstructive sleep apnea)-moderate 12/18/2012     Hyperlipidemia LDL goal <100 8/1/2016     Hypothyroidism 8/1/2016     Liver failure (H)      Moderate major depression (H) 10/3/2012     Obesity      Pancytopenia (H) 8/1/2016     Partial epilepsy with impairment of consciousness (H) 05/07/2014     Portal hypertensive gastropathy      Portal vein thrombosis 12/18/2015     Pulmonary nodules 6/17/2015     Rectal bleeding      Type 2 diabetes mellitus (H) 10/3/2012    Past Surgical History:   Procedure Laterality Date     COLONOSCOPY N/A 11/27/2015    Procedure: COMBINED COLONOSCOPY, SINGLE OR MULTIPLE BIOPSY/POLYPECTOMY BY BIOPSY;  Surgeon: Ran Thurston MD;  Location:  GI     ESOPHAGOSCOPY, GASTROSCOPY, DUODENOSCOPY (EGD), COMBINED N/A 11/23/2015    Procedure: COMBINED ESOPHAGOSCOPY, GASTROSCOPY, DUODENOSCOPY (EGD);  Surgeon: Rocael Rizvi MD;  Location:  OR     ESOPHAGOSCOPY, GASTROSCOPY, DUODENOSCOPY (EGD), COMBINED N/A 1/25/2017    Procedure: COMBINED ESOPHAGOSCOPY, GASTROSCOPY, DUODENOSCOPY (EGD), BIOPSY SINGLE OR MULTIPLE;  Surgeon: Rocael Tejada MD;  Location:  GI     ESOPHAGOSCOPY, GASTROSCOPY, DUODENOSCOPY (EGD), COMBINED N/A 3/30/2017    Procedure: COMBINED ESOPHAGOSCOPY, GASTROSCOPY, DUODENOSCOPY (EGD);  Surgeon: Jordana Ramirez MD;  Location:  GI     ESOPHAGOSCOPY, GASTROSCOPY, DUODENOSCOPY (EGD), COMBINED N/A 1/19/2018    Procedure: COMBINED ESOPHAGOSCOPY, GASTROSCOPY, DUODENOSCOPY (EGD);  Upper Endoscopy with verceal banding; Flexible Sigmoidoscopy;  Surgeon: Guru Jose Kelly MD;  Location: [facility] OR     ESOPHAGOSCOPY, GASTROSCOPY,  DUODENOSCOPY (EGD), COMBINED N/A 2/12/2018    Procedure: COMBINED ESOPHAGOSCOPY, GASTROSCOPY, DUODENOSCOPY (EGD);  Esophagogastroduodenoscopy with variceal banding;  Surgeon: Guru Jose Kelly MD;  Location: [facility] OR     ESOPHAGOSCOPY, GASTROSCOPY, DUODENOSCOPY (EGD), COMBINED N/A 3/5/2018    Procedure: COMBINED ESOPHAGOSCOPY, GASTROSCOPY, DUODENOSCOPY (EGD);  Esophagogastroduodenoscopy  with banding of varices Latex Allergy ;  Surgeon: Guru Jose Kelly MD;  Location: [facility] OR     ESOPHAGOSCOPY, GASTROSCOPY, DUODENOSCOPY (EGD), COMBINED N/A 4/16/2018    Procedure: COMBINED ESOPHAGOSCOPY, GASTROSCOPY, DUODENOSCOPY (EGD);  Upper Endoscopy  with esophageal varices banding; Latex Allergy ;  Surgeon: Guru Jose Kelly MD;  Location: [facility] OR     ESOPHAGOSCOPY, GASTROSCOPY, DUODENOSCOPY (EGD), COMBINED N/A 5/30/2018    Procedure: COMBINED ESOPHAGOSCOPY, GASTROSCOPY, DUODENOSCOPY (EGD);  upper endoscopy with banding of esophageal varices. *latex Allergy*;  Surgeon: Guru Jose Kelly MD;  Location:  OR     OPTICAL TRACKING SYSTEM CRANIOTOMY, EXCISE TUMOR, COMBINED  6/6/2013    Procedure: COMBINED OPTICAL TRACKING SYSTEM CRANIOTOMY, EXCISE TUMOR;  Left Stealth Guided Craniotomy , Tumor Resection ;  Surgeon: J Carlos Aranda MD;  Location:  OR     ORTHOPEDIC SURGERY      hand surgery- right     SIGMOIDOSCOPY FLEXIBLE N/A 11/24/2015    Procedure: SIGMOIDOSCOPY FLEXIBLE;  Surgeon: Rocael Rizvi MD;  [facility]     SIGMOIDOSCOPY FLEXIBLE N/A 1/19/2018    Procedure: SIGMOIDOSCOPY FLEXIBLE;;  Surgeon: Guru Jose Kelly MD;  Location:  OR                 Medications:     Current Outpatient Prescriptions   Medication     blood glucose monitoring (ONETOUCH ULTRA) test strip     Calcium Carb-Cholecalciferol (CALCIUM 500 +D) 500-400 MG-UNIT TABS     cyanocobalamin (VITAMIN  B-12) 1000 MCG tablet     dulaglutide (TRULICITY) 1.5  "MG/0.5ML pen     ferrous sulfate (IRON) 325 (65 FE) MG tablet     furosemide (LASIX) 20 MG tablet     gabapentin (NEURONTIN) 100 MG capsule     HYDROcodone-acetaminophen (NORCO) 5-325 MG per tablet     hydrOXYzine (ATARAX) 25 MG tablet     lactulose (CHRONULAC) 10 GM/15ML solution     levothyroxine (SYNTHROID/LEVOTHROID) 88 MCG tablet     lidocaine (LMX4) 4 % CREA cream     methylphenidate (RITALIN) 10 MG tablet     mirtazapine (REMERON) 45 MG tablet     multivitamin, therapeutic with minerals (THERA-VIT-M) TABS     omeprazole (PRILOSEC) 20 MG CR capsule     ONE TOUCH LANCETS MISC     phenytoin (DILANTIN) 30 MG CR capsule     pravastatin (PRAVACHOL) 80 MG tablet     spironolactone (ALDACTONE) 25 MG tablet     thiamine (VITAMIN B-1) 100 MG tablet     traZODone (DESYREL) 50 MG tablet     XIFAXAN 550 MG TABS tablet     CANE, ANY MATERIAL     ciclopirox (LOPROX) 0.77 % cream     [START ON 8/12/2018] methylphenidate (RITALIN) 10 MG tablet     [START ON 9/12/2018] methylphenidate (RITALIN) 10 MG tablet     mupirocin (BACTROBAN) 2 % ointment     [DISCONTINUED] gabapentin (NEURONTIN) 100 MG capsule     No current facility-administered medications for this visit.             Allergies:     Allergies   Allergen Reactions     Latex Itching and Rash     No Clinical Screening - See Comments      Coban and Surgilast cause itching     Tegaderm Transparent Dressing (Informational Only)             Review of Systems:   10 points ROS was obtained and highlighted in the HPI, otherwise negative.          Physical Exam:   VS:  /79 (BP Location: Left arm, Patient Position: Sitting, Cuff Size: Adult Regular)  Pulse 64  Temp 97.7  F (36.5  C) (Oral)  Resp 16  Ht 1.778 m (5' 10\")  Wt 83.4 kg (183 lb 12.8 oz)  SpO2 97%  BMI 26.37 kg/m2      Gen: A&Ox3, NAD, thin peripheal muscle wasting  HEENT: non-icteric, no cervical lymphadenopathy, no lesions or ulcers in oropharynx  CV: RRR, no overt murmurs  Lung: CTA Bilatererally, no " wheezing or crackles.   Lym- no palpable lymphadenopathy  Abd: soft, NT, ND, moderate amount of ascites   Ext: mild lower extremity edema in left leg, intact pulses.   Skin: No rash, no palmar erythema, telangiectasias or jaundice  Neuro: grossly intact, no asterixis   Psych: appropriate mood and affects           Data:   Reviewed in person and significant for:    Lab Results   Component Value Date     08/01/2018      Lab Results   Component Value Date    POTASSIUM 3.3 08/01/2018     Lab Results   Component Value Date    CHLORIDE 106 08/01/2018     Lab Results   Component Value Date    CO2 26 08/01/2018     Lab Results   Component Value Date    BUN 8 08/01/2018     Lab Results   Component Value Date    CR 0.80 08/01/2018       Lab Results   Component Value Date    WBC 1.7 08/01/2018     Lab Results   Component Value Date    HGB 12.9 08/01/2018     Lab Results   Component Value Date    HCT 38.2 08/01/2018     Lab Results   Component Value Date    MCV 98 08/01/2018     Lab Results   Component Value Date    PLT 42 08/01/2018       Lab Results   Component Value Date    AST 60 08/01/2018     Lab Results   Component Value Date    ALT 41 08/01/2018     No results found for: BILICONJ   Lab Results   Component Value Date    BILITOTAL 1.7 08/01/2018       Lab Results   Component Value Date    ALBUMIN 3.4 08/01/2018     Lab Results   Component Value Date    PROTTOTAL 6.9 08/01/2018      Lab Results   Component Value Date    ALKPHOS 404 08/01/2018       Lab Results   Component Value Date    INR 1.50 08/01/2018       Beatriz Tanner MD

## 2018-08-01 NOTE — MR AVS SNAPSHOT
After Visit Summary   8/1/2018    Mono Varghese    MRN: [id number]           Patient Information     Date Of Birth          1970        Visit Information        Provider Department      8/1/2018 8:30 AM Beatriz Tanner MD [facility] Hepatology        Today's Diagnoses     Alcoholic cirrhosis of liver with ascites (H)    -  1       Follow-ups after your visit        Your next 10 appointments already scheduled     Sep 04, 2018 12:30 PM CDT   (Arrive by 12:15 PM)   Return Seizure with Anil English MD   [facility] Neurology ([facility] [place])    [address] [place] [address]  3rd [facility] [address]   [phone]            Oct 02, 2018 10:00 AM CDT   Adult Med Follow UP with Leroy Alcaraz MD   [facility] ([facility])    [place] [id number]  [address] [address]   [phone]            Nov 21, 2018 10:45 AM CST   MR ABDOMEN W/O & W CONTRAST with [address] [facility] [place] [facility] ([facility])    [address] [place] [id number]   [phone]           Take your medicines as usual, unless your doctor tells you not to. Bring a list of your current medicines to your exam (including vitamins, minerals and over-the-counter drugs). Also bring the results of similar scans you may have had.    You may or may not receive IV contrast for this exam pending the discretion of the Radiologist.   Do not eat or drink for 6 hours prior to exam.  The MRI machine uses a strong magnet. Please wear clothes without metal (snaps, zippers). A sweatsuit works well, or we may give you a hospital gown.  Please remove any body piercings and hair extensions before you arrive. You will also remove watches, jewelry, hairpins, wallets, dentures, partial dental plates and hearing aids. You may wear contact lenses, and you may be able to wear your rings. We have a safe place to keep  your personal items, but it is safer to leave them at home.  **IMPORTANT** THE INSTRUCTIONS BELOW ARE ONLY FOR THOSE PATIENTS WHO HAVE BEEN PRESCRIBED SEDATION OR GENERAL ANESTHESIA DURING THEIR MRI PROCEDURE:  IF YOUR DOCTOR PRESCRIBED ORAL SEDATION (take medicine to help you relax during your exam):   You must get the medicine from your doctor (oral medication) before you arrive. Bring the medicine to the exam. Do not take it at home. You ll be told when to take it upon arriving for your exam.   Arrive one hour early. Bring someone who can take you home after the test. Your medicine will make you sleepy. After the exam, you may not drive, take a bus or take a taxi by yourself.  IF YOUR DOCTOR PRESCRIBED IV SEDATION:   Arrive one hour early. Bring someone who can take you home after the test. Your medicine will make you sleepy. After the exam, you may not drive, take a bus or take a taxi by yourself.   No eating 6 hours before your exam. You may have clear liquids up until 4 hours before your exam. (Clear liquids include water, clear tea, black coffee and fruit juice without pulp.)  IF YOUR DOCTOR PRESCRIBED ANESTHESIA (be asleep for your exam):   Arrive 1 1/2 hours early. Bring someone who can take you home after the test. You may not drive, take a bus or take a taxi by yourself.   No eating 8 hours before your exam. You may have clear liquids up until 4 hours before your exam. (Clear liquids include water, clear tea, black coffee and fruit juice without pulp.)   You will spend four to five hours in the recovery room.  If you have any questions, please contact your Imaging Department exam site.            Feb 13, 2019  9:00 AM CST   Lab with [facility] ([facility])    [address] [address] [phone]            Feb 13, 2019 10:00 AM CST   (Arrive by 9:45 AM)   Return General Liver with Beatriz Tanner MD   [facility] Hepatology  ([facility])    [address]  [address]  [facility] [address]   [phone]            Apr 26, 2019 10:00 AM CDT   MR BRAIN W/O & W CONTRAST with UCMR1   [facility] MRI ([facility])    [address]  1st Floor  [facility] [address]   [phone]           Take your medicines as usual, unless your doctor tells you not to. Bring a list of your current medicines to your exam (including vitamins, minerals and over-the-counter drugs).  You may or may not receive intravenous (IV) contrast for this exam pending the discretion of the Radiologist.  You do not need to do anything special to prepare.  The MRI machine uses a strong magnet. Please wear clothes without metal (snaps, zippers). A sweatsuit works well, or we may give you a hospital gown.  Please remove any body piercings and hair extensions before you arrive. You will also remove watches, jewelry, hairpins, wallets, dentures, partial dental plates and hearing aids. You may wear contact lenses, and you may be able to wear your rings. We have a safe place to keep your personal items, but it is safer to leave them at home.  **IMPORTANT** THE INSTRUCTIONS BELOW ARE ONLY FOR THOSE PATIENTS WHO HAVE BEEN PRESCRIBED SEDATION OR GENERAL ANESTHESIA DURING THEIR MRI PROCEDURE:  IF YOUR DOCTOR PRESCRIBED ORAL SEDATION (take medicine to help you relax during your exam):   You must get the medicine from your doctor (oral medication) before you arrive. Bring the medicine to the exam. Do not take it at home. You ll be told when to take it upon arriving for your exam.   Arrive one hour early. Bring someone who can take you home after the test. Your medicine will make you sleepy. After the exam, you may not drive, take a bus or take a taxi by yourself.  IF YOUR DOCTOR PRESCRIBED IV SEDATION:   Arrive one hour early. Bring someone who can take you home after the test. Your medicine will make you  sleepy. After the exam, you may not drive, take a bus or take a taxi by yourself.   No eating 6 hours before your exam. You may have clear liquids up until 4 hours before your exam. (Clear liquids include water, clear tea, black coffee and fruit juice without pulp.)  IF YOUR DOCTOR PRESCRIBED ANESTHESIA (be asleep for your exam):   Arrive 1 1/2 hours early. Bring someone who can take you home after the test. You may not drive, take a bus or take a taxi by yourself.   No eating 8 hours before your exam. You may have clear liquids up until 4 hours before your exam. (Clear liquids include water, clear tea, black coffee and fruit juice without pulp.)   You will spend four to five hours in the recovery room.  Please call the Imaging Department at your exam site with any questions.            May 03, 2019 10:45 AM CDT   (Arrive by 10:30 AM)   Return Visit with Lauro Shaw MD   [facility] ([facility])    [address] [place] [address]  [address] [place] [address] [phone]              Future tests that were ordered for you today     Open Future Orders        Priority Expected Expires Ordered    MRI Abdomen w & w/o contrast* Routine 12/1/2018 2/1/2019 8/1/2018            Who to contact     If you have questions or need follow up information about today's clinic visit or your schedule please contact [facility] HEPATOLOGY directly at [phone].  Normal or non-critical lab and imaging results will be communicated to you by MyChart, letter or phone within 4 business days after the clinic has received the results. If you do not hear from us within 7 days, please contact the clinic through MyChart or phone. If you have a critical or abnormal lab result, we will notify you by phone as soon as possible.  Submit refill requests through [employer] or call your pharmacy and they will forward the refill request to us. Please allow 3 business days for your refill to be completed.           "Additional Information About Your Visit        MyChart Information     [employer] gives you secure access to your electronic health record. If you see a primary care provider, you can also send messages to your care team and make appointments. If you have questions, please call your primary care clinic.  If you do not have a primary care provider, please call [phone] and they will assist you.        Care EveryWhere ID     This is your Care EveryWhere ID. This could be used by other organizations to access your [place] medical records  [id number]        Your Vitals Were     Pulse Temperature Respirations Height Pulse Oximetry BMI (Body Mass Index)    64 97.7  F (36.5  C) (Oral) 16 1.778 m (5' 10\") 97% 26.37 kg/m2       Blood Pressure from Last 3 Encounters:   08/01/18 120/79   07/11/18 127/87   06/22/18 125/74    Weight from Last 3 Encounters:   08/01/18 83.4 kg (183 lb 12.8 oz)   07/11/18 86.5 kg (190 lb 11.2 oz)   06/22/18 84.8 kg (187 lb)                 Today's Medication Changes          These changes are accurate as of 8/1/18  9:19 AM.  If you have any questions, ask your nurse or doctor.               These medicines have changed or have updated prescriptions.        Dose/Directions    cyanocobalamin 1000 MCG tablet   Commonly known as:  vitamin  B-12   This may have changed:  when to take this   Used for:  Alcoholic cirrhosis of liver with ascites (H)        Dose:  1000 mcg   1 tablet (1,000 mcg) by Per G Tube route daily   Quantity:  130 tablet   Refills:  2                Primary Care Provider Office Phone # Fax #    [place] [phone]        [address] [address]        Equal Access to Services     SAMUEL FAIR AH: Jennifer Thomas, wamaira martinez, tricia kaalmada kamla, josue tamayo. So [facility] [phone].    ATENCIÓN: Si habla español, tiene a chiang disposición servicios gratuitos de asistencia lingüística. " Manuelito al [phone].    We comply with applicable federal civil rights laws and [place] laws. We do not discriminate on the basis of race, color, national origin, age, disability, sex, sexual orientation, or gender identity.            Thank you!     Thank you for choosing [facility] HEPATOLOGY  for your care. Our goal is always to provide you with excellent care. Hearing back from our patients is one way we can continue to improve our services. Please take a few minutes to complete the written survey that you may receive in the mail after your visit with us. Thank you!             Your Updated Medication List - Protect others around you: Learn how to safely use, store and throw away your medicines at www.disposemymeds.org.          This list is accurate as of 8/1/18  9:19 AM.  Always use your most recent med list.                   Brand Name Dispense Instructions for use Diagnosis    blood glucose monitoring test strip    ONETOUCH ULTRA    400 each    Use to test blood sugars 4 times daily as needed or as directed.    Diabetes mellitus, type 2 (H)       Calcium Carb-Cholecalciferol 500-400 MG-UNIT Tabs    calcium 500 +D    90 tablet    Take 500 mg by mouth daily    Malnutrition (H)       CANE, ANY MATERIAL     1 each    One cane    Balance disorder       ciclopirox 0.77 % cream    LOPROX    90 g    Apply topically 2 times daily To feet and toenails.    Tinea pedis of both feet       cyanocobalamin 1000 MCG tablet    vitamin  B-12    130 tablet    1 tablet (1,000 mcg) by Per G Tube route daily    Alcoholic cirrhosis of liver with ascites (H)       dulaglutide 1.5 MG/0.5ML pen    TRULICITY    6 mL    Inject 1.5 mg Subcutaneous every 7 days    DM type 2, goal HbA1c 7%-8% (H)       ferrous sulfate 325 (65 Fe) MG tablet    IRON    200 tablet    Take 1 tablet (325 mg) by mouth 2 times daily    Other iron deficiency anemia       furosemide 20 MG tablet    LASIX    30 tablet    Take 1 tablet (20 mg) by mouth daily     Other ascites       gabapentin 100 MG capsule    NEURONTIN    30 capsule    Take 1 capsule (100 mg) by mouth At Bedtime    Mild episode of recurrent major depressive disorder (H)       HYDROcodone-acetaminophen 5-325 MG per tablet    NORCO    60 tablet    Take 2 tablets by mouth every 6 hours as needed for moderate to severe pain    Brain tumor (H)       hydrOXYzine 25 MG tablet    ATARAX    180 tablet    Take 1 tablet (25 mg) by mouth 2 times daily    Itching, Anxiety       lactulose 10 GM/15ML solution    CHRONULAC    946 mL    Take 15 mLs (10 g) by mouth daily    Hepatic encephalopathy (H)       levothyroxine 88 MCG tablet    SYNTHROID/LEVOTHROID    90 tablet    Take 1 tablet (88 mcg) by mouth daily    Hypothyroidism       lidocaine 4 % Crea cream    LMX4    133 g    Apply topically once as needed for mild pain    Port catheter in place       * methylphenidate 10 MG tablet    RITALIN    60 tablet    Take 1 tablet (10 mg) by mouth 2 times daily    Attention deficit hyperactivity disorder (ADHD), unspecified ADHD type       * methylphenidate 10 MG tablet   Start taking on:  8/12/2018    RITALIN    60 tablet    Take 1 tablet (10 mg) by mouth 2 times daily    Attention deficit hyperactivity disorder (ADHD), unspecified ADHD type       * methylphenidate 10 MG tablet   Start taking on:  9/12/2018    RITALIN    60 tablet    Take 1 tablet (10 mg) by mouth 2 times daily    Attention deficit hyperactivity disorder (ADHD), unspecified ADHD type       mirtazapine 45 MG tablet    REMERON    30 tablet    Take 1 tablet (45 mg) by mouth At Bedtime    Major depressive disorder with single episode, in partial remission (H)       multivitamin, therapeutic with minerals Tabs tablet      Take 1 tablet by mouth 2 times daily        mupirocin 2 % ointment    BACTROBAN    22 g    Use in nose 2-3 times per week    Epistaxis       omeprazole 20 MG CR capsule    priLOSEC    360 capsule    Take 2 capsules (40 mg) by mouth 2 times daily     Gastroesophageal reflux disease without esophagitis       ONETOUCH LANCETS Misc     100 each    by In Vitro route 4 times daily as needed    Type II or unspecified type diabetes mellitus without mention of complication, not stated as uncontrolled       phenytoin 30 MG CR capsule    DILANTIN    720 capsule    Take 4 capsules (120 mg) by mouth 2 times daily    Oligoastrocytoma of parietal lobe (H)       pravastatin 80 MG tablet    PRAVACHOL    90 tablet    Take 1 tablet (80 mg) by mouth daily    High cholesterol       spironolactone 25 MG tablet    ALDACTONE    180 tablet    Take 2 tablets (50 mg) by mouth daily    Other ascites       thiamine 100 MG tablet     100 tablet    Take 1 tablet (100 mg) by mouth daily    Alcoholic cirrhosis of liver with ascites (H)       traZODone 50 MG tablet    DESYREL    30 tablet    Take 1 tablet (50 mg) by mouth nightly as needed for sleep    Primary insomnia       XIFAXAN 550 MG Tabs tablet   Generic drug:  rifaximin     60 tablet    TAKE ONE TABLET BY MOUTH TWICE DAILY    Hepatic encephalopathy (H)       * Notice:  This list has 3 medication(s) that are the same as other medications prescribed for you. Read the directions carefully, and ask your doctor or other care provider to review them with you.

## 2018-08-01 NOTE — NURSING NOTE
"Chief Complaint   Patient presents with     RECHECK     Cirrhosis       /79 (BP Location: Left arm, Patient Position: Sitting, Cuff Size: Adult Regular)  Pulse 64  Temp 97.7  F (36.5  C) (Oral)  Resp 16  Ht 1.778 m (5' 10\")  Wt 83.4 kg (183 lb 12.8 oz)  SpO2 97%  BMI 26.37 kg/m2    Kristen Don [facility]  8/1/2018 8:19 AM      "

## 2018-08-01 NOTE — LETTER
8/1/2018       RE: Mono Varghese  [id number] [facility] [address]     Dear Colleague,    Thank you for referring your patient, Mono Varghese, to the [facility] HEPATOLOGY at [facility]. Please see a copy of my visit note below.    Hepatology Follow-up Clinic note  Mono Varghese   Date of Birth 1970  Date of Service 8/1/2018    Reason for follow-up: ETOH cirrhosis          Assessment/plan:   Mnoo Varghese is a 47 year old male with ETOH cirrhosis complicated by ascites requiring paracentesis, hepatic encephalopathy and esophageal varices s/p banding. His encephalopathy is controlled on current regimen. No asterixis on exam.   Last HCC screening in 5/3/2018 showing two LIRADs-3 lesions. He is up to date with variceal screening with EGD and will be due for repeat EGD in 2 months due to recent banding. His MELD is 13. His liver function is relatively stable with mild elevation in bilirubin, INR and low platelets.  We discussed importance of nutrition and increasing protein in diet to maintain his functionality.     1. Hepatic Encephalopathy  - Continue rifaximin   - Continue lactulose    2. Ascites  - Paracentesis prn   - Continue lasix   -  2000 mg sodium/high protein diet    3. Hx of esophageal varices  - EGD scheduled in October 2018     4. HCC screening/History of LIRADs-3  -  Repeat MRI in 6-12 months     5. Follow-up in clinic in 6 months or sooner as needed    Casey Barone PA-C   [facility] Hepatology clinic    Mono Varghese was seen and evaluated today as apart of a shared APRN/PA visit. I reviewed today's vitals, hepatic panel, CBC and INR, MRI and recent EGD. My key exam findings include: ETOH cirrhosis with ascites. Key management decisions made by me and carried out under my direction include: Continue Rifaximin and lactulose for hepatic encephalopathy, Continue paracentesis PRN and diuretics for ascites, repeat EGD for history of  recent esophageal varices and banding, repeat MRI for HCC screening in 3-6 months     Beatriz Tanner      -----------------------------------------------------       HPI:   Mono Varghese is a 47 year old male presenting for follow-up.     ETOH Cirrhosis  Complicated by:  - Ascites, requiring paracentesis  - Hx of GI bleed  - Hx of HE  EGD: 7/11/2018, grade 2 esophageal varices s/p banding  HCC: 5/3/2018, MRI showing, 2 LIRARDs lesions    Patient was last seen by Dr. Alcazar and Dr. Tanner on 4/25/2018. No recent hospitalizations or ER visits. Recent medication changes including stopping glipizide and Cepra.  He goes to [facility] for paracentesis every 2 weeks and they usually take out 8 L. He has coughing when his fluid level is up. He had paracentesis in the past few days and only had 3L removed. Catheter fell out out half way through procedure after he went to the bathroom. He will be rescheduled for repeat paracentesis next week as well. His appetite is down and he has continued to lose weight. He has a history of oligoastrocytoma of parietal lobe and has been stable without any signs of reoccurrence. He also has history of seizures and follows with Neurology regularly. Neuropsych evaluation noting global impairment with major deficits in all cognifitive domains.     He had an EGD in August 2018.  He has multiple bowel movements a day, he has cut down on his dose of lactulose because of frequent and explosive bowel movements. He has a small amount of bright red blood in diapers. No consistent dripping or blood in stool. They deny any changes in confusion, his mental impairment  His mother-in-law has noticed more exageratted movements with hands and mouth. Patient denies lower extremity edema.  Patient also denies hematemesis. Patient denies sweats or chills.    His wife is currently in the ICU at [facility] with ARDS. His mother-in-law who is a retired NP is helping support him at this time. He also has  a home health RN that fills his medications weekly. Last ETOH was 3 years ago.      Medical hx Surgical hx   Past Medical History:   Diagnosis Date     ADHD      Alcoholic cirrhosis of liver with ascites (H) 06/17/2015     Ascites due to alcoholic cirrhosis (H)      Chronic pain 8/1/2016     Depressive disorder 02/01/2001     Encephalopathy, hepatic (H) 7/29/2017     GERD (gastroesophageal reflux disease)      GIB (gastrointestinal bleeding) 11/23/2015     H/O Oligoastrocytoma of parietal lobe (H) 06/11/2013     H/O LENA (obstructive sleep apnea)-moderate 12/18/2012     Hyperlipidemia LDL goal <100 8/1/2016     Hypothyroidism 8/1/2016     Liver failure (H)      Moderate major depression (H) 10/3/2012     Obesity      Pancytopenia (H) 8/1/2016     Partial epilepsy with impairment of consciousness (H) 05/07/2014     Portal hypertensive gastropathy      Portal vein thrombosis 12/18/2015     Pulmonary nodules 6/17/2015     Rectal bleeding      Type 2 diabetes mellitus (H) 10/3/2012    Past Surgical History:   Procedure Laterality Date     COLONOSCOPY N/A 11/27/2015    Procedure: COMBINED COLONOSCOPY, SINGLE OR MULTIPLE BIOPSY/POLYPECTOMY BY BIOPSY;  Surgeon: Ran Thurston MD;  Location:  GI     ESOPHAGOSCOPY, GASTROSCOPY, DUODENOSCOPY (EGD), COMBINED N/A 11/23/2015    Procedure: COMBINED ESOPHAGOSCOPY, GASTROSCOPY, DUODENOSCOPY (EGD);  Surgeon: Rocael Rizvi MD;  Location:  OR     ESOPHAGOSCOPY, GASTROSCOPY, DUODENOSCOPY (EGD), COMBINED N/A 1/25/2017    Procedure: COMBINED ESOPHAGOSCOPY, GASTROSCOPY, DUODENOSCOPY (EGD), BIOPSY SINGLE OR MULTIPLE;  Surgeon: Rocael Tejada MD;  Location:  GI     ESOPHAGOSCOPY, GASTROSCOPY, DUODENOSCOPY (EGD), COMBINED N/A 3/30/2017    Procedure: COMBINED ESOPHAGOSCOPY, GASTROSCOPY, DUODENOSCOPY (EGD);  Surgeon: Jordana Ramirez MD;  Location:  GI     ESOPHAGOSCOPY, GASTROSCOPY, DUODENOSCOPY (EGD), COMBINED N/A 1/19/2018    Procedure: COMBINED  ESOPHAGOSCOPY, GASTROSCOPY, DUODENOSCOPY (EGD);  Upper Endoscopy with verceal banding; Flexible Sigmoidoscopy;  Surgeon: Guru Jose Kelly MD;  [facility]     ESOPHAGOSCOPY, GASTROSCOPY, DUODENOSCOPY (EGD), COMBINED N/A 2/12/2018    Procedure: COMBINED ESOPHAGOSCOPY, GASTROSCOPY, DUODENOSCOPY (EGD);  Esophagogastroduodenoscopy with variceal banding;  Surgeon: Guru Jose Kelly MD;  [facility]     ESOPHAGOSCOPY, GASTROSCOPY, DUODENOSCOPY (EGD), COMBINED N/A 3/5/2018    Procedure: COMBINED ESOPHAGOSCOPY, GASTROSCOPY, DUODENOSCOPY (EGD);  Esophagogastroduodenoscopy  with banding of varices Latex Allergy ;  Surgeon: Guru Jose Kelly MD;  [facility]     ESOPHAGOSCOPY, GASTROSCOPY, DUODENOSCOPY (EGD), COMBINED N/A 4/16/2018    Procedure: COMBINED ESOPHAGOSCOPY, GASTROSCOPY, DUODENOSCOPY (EGD);  Upper Endoscopy  with esophageal varices banding; Latex Allergy ;  Surgeon: Guru Jose Kelly MD;  [facility]     ESOPHAGOSCOPY, GASTROSCOPY, DUODENOSCOPY (EGD), COMBINED N/A 5/30/2018    Procedure: COMBINED ESOPHAGOSCOPY, GASTROSCOPY, DUODENOSCOPY (EGD);  upper endoscopy with banding of esophageal varices. *latex Allergy*;  Surgeon: Guru Jose Kelly MD;  Location:  OR     OPTICAL TRACKING SYSTEM CRANIOTOMY, EXCISE TUMOR, COMBINED  6/6/2013    Procedure: COMBINED OPTICAL TRACKING SYSTEM CRANIOTOMY, EXCISE TUMOR;  Left Stealth Guided Craniotomy , Tumor Resection ;  Surgeon: J Carlos Aranda MD;  Location:  OR     ORTHOPEDIC SURGERY      hand surgery- right     SIGMOIDOSCOPY FLEXIBLE N/A 11/24/2015    Procedure: SIGMOIDOSCOPY FLEXIBLE;  Surgeon: Rocael Rizvi MD;  Location:  GI     SIGMOIDOSCOPY FLEXIBLE N/A 1/19/2018    Procedure: SIGMOIDOSCOPY FLEXIBLE;;  Surgeon: Guru Jose Kelly MD;  [facility]                 Medications:     Current Outpatient Prescriptions   Medication      "blood glucose monitoring (ONETOUCH ULTRA) test strip     Calcium Carb-Cholecalciferol (CALCIUM 500 +D) 500-400 MG-UNIT TABS     cyanocobalamin (VITAMIN  B-12) 1000 MCG tablet     dulaglutide (TRULICITY) 1.5 MG/0.5ML pen     ferrous sulfate (IRON) 325 (65 FE) MG tablet     furosemide (LASIX) 20 MG tablet     gabapentin (NEURONTIN) 100 MG capsule     HYDROcodone-acetaminophen (NORCO) 5-325 MG per tablet     hydrOXYzine (ATARAX) 25 MG tablet     lactulose (CHRONULAC) 10 GM/15ML solution     levothyroxine (SYNTHROID/LEVOTHROID) 88 MCG tablet     lidocaine (LMX4) 4 % CREA cream     methylphenidate (RITALIN) 10 MG tablet     mirtazapine (REMERON) 45 MG tablet     multivitamin, therapeutic with minerals (THERA-VIT-M) TABS     omeprazole (PRILOSEC) 20 MG CR capsule     ONE TOUCH LANCETS MISC     phenytoin (DILANTIN) 30 MG CR capsule     pravastatin (PRAVACHOL) 80 MG tablet     spironolactone (ALDACTONE) 25 MG tablet     thiamine (VITAMIN B-1) 100 MG tablet     traZODone (DESYREL) 50 MG tablet     XIFAXAN 550 MG TABS tablet     CANE, ANY MATERIAL     ciclopirox (LOPROX) 0.77 % cream     [START ON 8/12/2018] methylphenidate (RITALIN) 10 MG tablet     [START ON 9/12/2018] methylphenidate (RITALIN) 10 MG tablet     mupirocin (BACTROBAN) 2 % ointment     [DISCONTINUED] gabapentin (NEURONTIN) 100 MG capsule     No current facility-administered medications for this visit.             Allergies:     Allergies   Allergen Reactions     Latex Itching and Rash     No Clinical Screening - See Comments      Coban and Surgilast cause itching     Tegaderm Transparent Dressing (Informational Only)             Review of Systems:   10 points ROS was obtained and highlighted in the HPI, otherwise negative.          Physical Exam:   VS:  /79 (BP Location: Left arm, Patient Position: Sitting, Cuff Size: Adult Regular)  Pulse 64  Temp 97.7  F (36.5  C) (Oral)  Resp 16  Ht 1.778 m (5' 10\")  Wt 83.4 kg (183 lb 12.8 oz)  SpO2 97%  BMI " 26.37 kg/m2      Gen: A&Ox3, NAD, thin peripheal muscle wasting  HEENT: non-icteric, no cervical lymphadenopathy, no lesions or ulcers in oropharynx  CV: RRR, no overt murmurs  Lung: CTA Bilatererally, no wheezing or crackles.   Lym- no palpable lymphadenopathy  Abd: soft, NT, ND, moderate amount of ascites   Ext: mild lower extremity edema in left leg, intact pulses.   Skin: No rash, no palmar erythema, telangiectasias or jaundice  Neuro: grossly intact, no asterixis   Psych: appropriate mood and affects           Data:   Reviewed in person and significant for:    Lab Results   Component Value Date     08/01/2018      Lab Results   Component Value Date    POTASSIUM 3.3 08/01/2018     Lab Results   Component Value Date    CHLORIDE 106 08/01/2018     Lab Results   Component Value Date    CO2 26 08/01/2018     Lab Results   Component Value Date    BUN 8 08/01/2018     Lab Results   Component Value Date    CR 0.80 08/01/2018       Lab Results   Component Value Date    WBC 1.7 08/01/2018     Lab Results   Component Value Date    HGB 12.9 08/01/2018     Lab Results   Component Value Date    HCT 38.2 08/01/2018     Lab Results   Component Value Date    MCV 98 08/01/2018     Lab Results   Component Value Date    PLT 42 08/01/2018       Lab Results   Component Value Date    AST 60 08/01/2018     Lab Results   Component Value Date    ALT 41 08/01/2018     No results found for: BILICONJ   Lab Results   Component Value Date    BILITOTAL 1.7 08/01/2018       Lab Results   Component Value Date    ALBUMIN 3.4 08/01/2018     Lab Results   Component Value Date    PROTTOTAL 6.9 08/01/2018      Lab Results   Component Value Date    ALKPHOS 404 08/01/2018       Lab Results   Component Value Date    INR 1.50 08/01/2018     Again, thank you for allowing me to participate in the care of your patient.      Sincerely,    Beatriz Tanner MD

## 2018-08-01 NOTE — PROGRESS NOTES
Hepatology Follow-up Clinic note  Mono Varghese   Date of Birth 1970  Date of Service 8/1/2018    Reason for follow-up: ETOH cirrhosis          Assessment/plan:   Mono Varghese is a 47 year old male with ETOH cirrhosis complicated by ascites requiring paracentesis, hepatic encephalopathy and esophageal varices s/p banding. His encephalopathy is controlled on current regimen. No asterixis on exam.   Last HCC screening in 5/3/2018 showing two LIRADs-3 lesions. He is up to date with variceal screening with EGD and will be due for repeat EGD in 2 months due to recent banding. His MELD is 13. His liver function is relatively stable with mild elevation in bilirubin, INR and low platelets.  We discussed importance of nutrition and increasing protein in diet to maintain his functionality.     1. Hepatic Encephalopathy  - Continue rifaximin   - Continue lactulose    2. Ascites  - Paracentesis prn   - Continue lasix   -  2000 mg sodium/high protein diet    3. Hx of esophageal varices  - EGD scheduled in October 2018     4. HCC screening/History of LIRADs-3  -  Repeat MRI in 6-12 months     5. Follow-up in clinic in 6 months or sooner as needed    Casey Barone PA-C   [facility] Hepatology clinic    Mono Varghese was seen and evaluated today as apart of a shared APRN/PA visit. I reviewed today's vitals, hepatic panel, CBC and INR, MRI and recent EGD. My key exam findings include: ETOH cirrhosis with ascites. Key management decisions made by me and carried out under my direction include: Continue Rifaximin and lactulose for hepatic encephalopathy, Continue paracentesis PRN and diuretics for ascites, repeat EGD for history of recent esophageal varices and banding, repeat MRI for HCC screening in 3-6 months     Beatriz Tanner      -----------------------------------------------------       HPI:   Mono Varghese is a 47 year old male presenting for follow-up.     ETOH Cirrhosis  Complicated  by:  - Ascites, requiring paracentesis  - Hx of GI bleed  - Hx of HE  EGD: 7/11/2018, grade 2 esophageal varices s/p banding  HCC: 5/3/2018, MRI showing, 2 LIRARDs lesions    Patient was last seen by Dr. Alcazar and Dr. Tanner on 4/25/2018. No recent hospitalizations or ER visits. Recent medication changes including stopping glipizide and Cepra.  He goes to [facility] for paracentesis every 2 weeks and they usually take out 8 L. He has coughing when his fluid level is up. He had paracentesis in the past few days and only had 3L removed. Catheter fell out out half way through procedure after he went to the bathroom. He will be rescheduled for repeat paracentesis next week as well. His appetite is down and he has continued to lose weight. He has a history of oligoastrocytoma of parietal lobe and has been stable without any signs of reoccurrence. He also has history of seizures and follows with Neurology regularly. Neuropsych evaluation noting global impairment with major deficits in all cognifitive domains.     He had an EGD in August 2018.  He has multiple bowel movements a day, he has cut down on his dose of lactulose because of frequent and explosive bowel movements. He has a small amount of bright red blood in diapers. No consistent dripping or blood in stool. They deny any changes in confusion, his mental impairment  His mother-in-law has noticed more exageratted movements with hands and mouth. Patient denies lower extremity edema.  Patient also denies hematemesis. Patient denies sweats or chills.    His wife is currently in the ICU at [facility] with ARDS. His mother-in-law who is a retired NP is helping support him at this time. He also has a home health RN that fills his medications weekly. Last ETOH was 3 years ago.      Medical hx Surgical hx   Past Medical History:   Diagnosis Date     ADHD      Alcoholic cirrhosis of liver with ascites (H) 06/17/2015     Ascites due to alcoholic cirrhosis (H)      Chronic pain  8/1/2016     Depressive disorder 02/01/2001     Encephalopathy, hepatic (H) 7/29/2017     GERD (gastroesophageal reflux disease)      GIB (gastrointestinal bleeding) 11/23/2015     H/O Oligoastrocytoma of parietal lobe (H) 06/11/2013     H/O LENA (obstructive sleep apnea)-moderate 12/18/2012     Hyperlipidemia LDL goal <100 8/1/2016     Hypothyroidism 8/1/2016     Liver failure (H)      Moderate major depression (H) 10/3/2012     Obesity      Pancytopenia (H) 8/1/2016     Partial epilepsy with impairment of consciousness (H) 05/07/2014     Portal hypertensive gastropathy      Portal vein thrombosis 12/18/2015     Pulmonary nodules 6/17/2015     Rectal bleeding      Type 2 diabetes mellitus (H) 10/3/2012    Past Surgical History:   Procedure Laterality Date     COLONOSCOPY N/A 11/27/2015    Procedure: COMBINED COLONOSCOPY, SINGLE OR MULTIPLE BIOPSY/POLYPECTOMY BY BIOPSY;  Surgeon: Ran Thurston MD;  [facility]     ESOPHAGOSCOPY, GASTROSCOPY, DUODENOSCOPY (EGD), COMBINED N/A 11/23/2015    Procedure: COMBINED ESOPHAGOSCOPY, GASTROSCOPY, DUODENOSCOPY (EGD);  Surgeon: Rocael Rizvi MD;  [facility]     ESOPHAGOSCOPY, GASTROSCOPY, DUODENOSCOPY (EGD), COMBINED N/A 1/25/2017    Procedure: COMBINED ESOPHAGOSCOPY, GASTROSCOPY, DUODENOSCOPY (EGD), BIOPSY SINGLE OR MULTIPLE;  Surgeon: Rocael Tejada MD;  [facility]     ESOPHAGOSCOPY, GASTROSCOPY, DUODENOSCOPY (EGD), COMBINED N/A 3/30/2017    Procedure: COMBINED ESOPHAGOSCOPY, GASTROSCOPY, DUODENOSCOPY (EGD);  Surgeon: Jordana Ramirez MD;  [facility]     ESOPHAGOSCOPY, GASTROSCOPY, DUODENOSCOPY (EGD), COMBINED N/A 1/19/2018    Procedure: COMBINED ESOPHAGOSCOPY, GASTROSCOPY, DUODENOSCOPY (EGD);  Upper Endoscopy with verceal banding; Flexible Sigmoidoscopy;  Surgeon: Guru Jose Kelly MD;  [facility]     ESOPHAGOSCOPY, GASTROSCOPY, DUODENOSCOPY (EGD), COMBINED N/A 2/12/2018    Procedure: COMBINED  ESOPHAGOSCOPY, GASTROSCOPY, DUODENOSCOPY (EGD);  Esophagogastroduodenoscopy with variceal banding;  Surgeon: Guru Jose Kelly MD;  [facility]     ESOPHAGOSCOPY, GASTROSCOPY, DUODENOSCOPY (EGD), COMBINED N/A 3/5/2018    Procedure: COMBINED ESOPHAGOSCOPY, GASTROSCOPY, DUODENOSCOPY (EGD);  Esophagogastroduodenoscopy  with banding of varices Latex Allergy ;  Surgeon: Guru Jose Kelly MD;  [facility]     ESOPHAGOSCOPY, GASTROSCOPY, DUODENOSCOPY (EGD), COMBINED N/A 4/16/2018    Procedure: COMBINED ESOPHAGOSCOPY, GASTROSCOPY, DUODENOSCOPY (EGD);  Upper Endoscopy  with esophageal varices banding; Latex Allergy ;  Surgeon: Guru Jose Kelly MD;  [facility]     ESOPHAGOSCOPY, GASTROSCOPY, DUODENOSCOPY (EGD), COMBINED N/A 5/30/2018    Procedure: COMBINED ESOPHAGOSCOPY, GASTROSCOPY, DUODENOSCOPY (EGD);  upper endoscopy with banding of esophageal varices. *latex Allergy*;  Surgeon: Guru Jose Kelly MD;  Location:  OR     OPTICAL TRACKING SYSTEM CRANIOTOMY, EXCISE TUMOR, COMBINED  6/6/2013    Procedure: COMBINED OPTICAL TRACKING SYSTEM CRANIOTOMY, EXCISE TUMOR;  Left Stealth Guided Craniotomy , Tumor Resection ;  Surgeon: J Carlos Aranda MD;  Location:  OR     ORTHOPEDIC SURGERY      hand surgery- right     SIGMOIDOSCOPY FLEXIBLE N/A 11/24/2015    Procedure: SIGMOIDOSCOPY FLEXIBLE;  Surgeon: Rocael Rizvi MD;  Location:  GI     SIGMOIDOSCOPY FLEXIBLE N/A 1/19/2018    Procedure: SIGMOIDOSCOPY FLEXIBLE;;  Surgeon: Guru Jose Kelly MD;  Location:  OR                 Medications:     Current Outpatient Prescriptions   Medication     blood glucose monitoring (ONETOUCH ULTRA) test strip     Calcium Carb-Cholecalciferol (CALCIUM 500 +D) 500-400 MG-UNIT TABS     cyanocobalamin (VITAMIN  B-12) 1000 MCG tablet     dulaglutide (TRULICITY) 1.5 MG/0.5ML pen     ferrous sulfate (IRON) 325 (65 FE) MG tablet      "furosemide (LASIX) 20 MG tablet     gabapentin (NEURONTIN) 100 MG capsule     HYDROcodone-acetaminophen (NORCO) 5-325 MG per tablet     hydrOXYzine (ATARAX) 25 MG tablet     lactulose (CHRONULAC) 10 GM/15ML solution     levothyroxine (SYNTHROID/LEVOTHROID) 88 MCG tablet     lidocaine (LMX4) 4 % CREA cream     methylphenidate (RITALIN) 10 MG tablet     mirtazapine (REMERON) 45 MG tablet     multivitamin, therapeutic with minerals (THERA-VIT-M) TABS     omeprazole (PRILOSEC) 20 MG CR capsule     ONE TOUCH LANCETS MISC     phenytoin (DILANTIN) 30 MG CR capsule     pravastatin (PRAVACHOL) 80 MG tablet     spironolactone (ALDACTONE) 25 MG tablet     thiamine (VITAMIN B-1) 100 MG tablet     traZODone (DESYREL) 50 MG tablet     XIFAXAN 550 MG TABS tablet     CANE, ANY MATERIAL     ciclopirox (LOPROX) 0.77 % cream     [START ON 8/12/2018] methylphenidate (RITALIN) 10 MG tablet     [START ON 9/12/2018] methylphenidate (RITALIN) 10 MG tablet     mupirocin (BACTROBAN) 2 % ointment     [DISCONTINUED] gabapentin (NEURONTIN) 100 MG capsule     No current facility-administered medications for this visit.             Allergies:     Allergies   Allergen Reactions     Latex Itching and Rash     No Clinical Screening - See Comments      Coban and Surgilast cause itching     Tegaderm Transparent Dressing (Informational Only)             Review of Systems:   10 points ROS was obtained and highlighted in the HPI, otherwise negative.          Physical Exam:   VS:  /79 (BP Location: Left arm, Patient Position: Sitting, Cuff Size: Adult Regular)  Pulse 64  Temp 97.7  F (36.5  C) (Oral)  Resp 16  Ht 1.778 m (5' 10\")  Wt 83.4 kg (183 lb 12.8 oz)  SpO2 97%  BMI 26.37 kg/m2      Gen: A&Ox3, NAD, thin peripheal muscle wasting  HEENT: non-icteric, no cervical lymphadenopathy, no lesions or ulcers in oropharynx  CV: RRR, no overt murmurs  Lung: CTA Bilatererally, no wheezing or crackles.   Lym- no palpable lymphadenopathy  Abd: soft, " NT, ND, moderate amount of ascites   Ext: mild lower extremity edema in left leg, intact pulses.   Skin: No rash, no palmar erythema, telangiectasias or jaundice  Neuro: grossly intact, no asterixis   Psych: appropriate mood and affects           Data:   Reviewed in person and significant for:    Lab Results   Component Value Date     08/01/2018      Lab Results   Component Value Date    POTASSIUM 3.3 08/01/2018     Lab Results   Component Value Date    CHLORIDE 106 08/01/2018     Lab Results   Component Value Date    CO2 26 08/01/2018     Lab Results   Component Value Date    BUN 8 08/01/2018     Lab Results   Component Value Date    CR 0.80 08/01/2018       Lab Results   Component Value Date    WBC 1.7 08/01/2018     Lab Results   Component Value Date    HGB 12.9 08/01/2018     Lab Results   Component Value Date    HCT 38.2 08/01/2018     Lab Results   Component Value Date    MCV 98 08/01/2018     Lab Results   Component Value Date    PLT 42 08/01/2018       Lab Results   Component Value Date    AST 60 08/01/2018     Lab Results   Component Value Date    ALT 41 08/01/2018     No results found for: BILICONJ   Lab Results   Component Value Date    BILITOTAL 1.7 08/01/2018       Lab Results   Component Value Date    ALBUMIN 3.4 08/01/2018     Lab Results   Component Value Date    PROTTOTAL 6.9 08/01/2018      Lab Results   Component Value Date    ALKPHOS 404 08/01/2018       Lab Results   Component Value Date    INR 1.50 08/01/2018

## 2018-08-07 NOTE — PROGRESS NOTES
This is a recent snapshot of the patient's [facility] medical record.  For current drug dose and complete information and questions, call [phone] or In Basket pool, [facility] home infusion ([id number])  CSN Number:  [id number]

## 2018-08-16 NOTE — PROGRESS NOTES
This is a recent snapshot of the patient's [facility] medical record.  For current drug dose and complete information and questions, call [phone] or In Basket pool, [facility] home infusion ([id number])  CSN Number:  [id number]

## 2018-08-23 NOTE — TELEPHONE ENCOUNTER
"Is the patient experiencing hypoglycemia with Trulicity 1.5 mg once weekly? What do they consider a \"low\" blood sugar reading?  Trulicity only works when there is enough sugar present in the blood for insulin to be produced by the pancreas. Also, it is a long-acting medication that will work all week in between doses. One lower blood sugar reading doesn't reflect the whole week and shouldn't be used to determine which dose they use. The last thing is that insurance is unlikely to pay for prescriptions for both strengths at the same time.  I would stick with the Trulicity 1.5 mg daily unless the patient experiences hypoglycemia on this dose.     Viji Howard, Pharm.D., [facility]  Medication Therapy Management Pharmacist  Page/VM:  [phone]    "

## 2018-08-24 NOTE — MR AVS SNAPSHOT
After Visit Summary   8/24/2018    Mono Varghese    MRN: [id number]           Patient Information     Date Of Birth          1970        Visit Information        Provider Department      8/24/2018 2:50 PM King Louis MD [facility]        Today's Diagnoses     Right inguinal hernia    -  1    Loss of weight        Movement disorder          Care Instructions    Primary Care Center Medication Refill Request Information:  * Please contact your pharmacy regarding ANY request for medication refills.  ** [facility] Prescription Fax = [phone]  * Please allow 3 business days for routine medication refills.  * Please allow 5 business days for controlled substance medication refills.     Primary Care Center Test Result notification information:  *You will be notified with in 7-10 days of your appointment day regarding the results of your test.  If you are on MyChart you will be notified as soon as the provider has reviewed the results and signed off on them.    [facility]: [phone]             Follow-ups after your visit        Your next 10 appointments already scheduled     Sep 04, 2018 12:30 PM CDT   (Arrive by 12:15 PM)   Return Seizure with Anil English MD   [facility] ([facility])    [address] [place] [id number] [phone]            Oct 01, 2018   Procedure with Guru Jose Kelly MD   [facility] [place] Same Day Surgery (--)    [address] [facility] [address] [phone]            Oct 02, 2018 10:00 AM CDT   Adult Med Follow UP with Leroy Alcaraz MD   [facility] ([facility])    [address]  [address] [id number] [phone]            Nov 21, 2018 10:45 AM CST   MR ABDOMEN W/O & W CONTRAST with [address] [facility] ([facility] [place])    [address] [place] [address]  [facility] [address] [phone]           Take your medicines as usual, unless your doctor tells you not to. Bring a list of your current medicines to your exam (including vitamins, minerals and over-the-counter drugs). Also bring the results of similar scans you may have had.    You may or may not receive IV contrast for this exam pending the discretion of the Radiologist.   Do not eat or drink for 6 hours prior to exam.  The MRI machine uses a strong magnet. Please wear clothes without metal (snaps, zippers). A sweatsuit works well, or we may give you a hospital gown.  Please remove any body piercings and hair extensions before you arrive. You will also remove watches, jewelry, hairpins, wallets, dentures, partial dental plates and hearing aids. You may wear contact lenses, and you may be able to wear your rings. We have a safe place to keep your personal items, but it is safer to leave them at home.  **IMPORTANT** THE INSTRUCTIONS BELOW ARE ONLY FOR THOSE PATIENTS WHO HAVE BEEN PRESCRIBED SEDATION OR GENERAL ANESTHESIA DURING THEIR MRI PROCEDURE:  IF YOUR DOCTOR PRESCRIBED ORAL SEDATION (take medicine to help you relax during your exam):   You must get the medicine from your doctor (oral medication) before you arrive. Bring the medicine to the exam. Do not take it at home. You ll be told when to take it upon arriving for your exam.   Arrive one hour early. Bring someone who can take you home after the test. Your medicine will make you sleepy. After the exam, you may not drive, take a bus or take a taxi by yourself.  IF YOUR DOCTOR PRESCRIBED IV SEDATION:   Arrive one hour early. Bring someone who can take you home after the test. Your medicine will make you sleepy. After the exam, you may not drive, take a bus or take a taxi by yourself.   No eating 6 hours before your exam. You may have clear liquids up until 4 hours before your exam. (Clear liquids include water, clear tea, black coffee and  fruit juice without pulp.)  IF YOUR DOCTOR PRESCRIBED ANESTHESIA (be asleep for your exam):   Arrive 1 1/2 hours early. Bring someone who can take you home after the test. You may not drive, take a bus or take a taxi by yourself.   No eating 8 hours before your exam. You may have clear liquids up until 4 hours before your exam. (Clear liquids include water, clear tea, black coffee and fruit juice without pulp.)   You will spend four to five hours in the recovery room.  If you have any questions, please contact your Imaging Department exam site.            Feb 13, 2019  9:00 AM CST   Lab with  LAB   [facility] ([facility])    [address]  1st Floor  [facility] [id number] [phone]            Feb 13, 2019 10:00 AM CST   (Arrive by 9:45 AM)   Return General Liver with Beatriz Tanner MD   [facility]at[facility] ([facility])    [address] [place] [address]  [address]  [facility] [id number] [phone]            Apr 26, 2019 10:00 AM CDT   MR BRAIN W/O & W CONTRAST with [address] MRI ([facility])    [address]  1st [facility] [id number] [phone]           Take your medicines as usual, unless your doctor tells you not to. Bring a list of your current medicines to your exam (including vitamins, minerals and over-the-counter drugs).  You may or may not receive intravenous (IV) contrast for this exam pending the discretion of the Radiologist.  You do not need to do anything special to prepare.  The MRI machine uses a strong magnet. Please wear clothes without metal (snaps, zippers). A sweatsuit works well, or we may give you a hospital gown.  Please remove any body piercings and hair extensions before you arrive. You will also remove watches, jewelry, hairpins, wallets, dentures, partial dental plates and hearing aids. You may wear contact lenses, and you may be able to  wear your rings. We have a safe place to keep your personal items, but it is safer to leave them at home.  **IMPORTANT** THE INSTRUCTIONS BELOW ARE ONLY FOR THOSE PATIENTS WHO HAVE BEEN PRESCRIBED SEDATION OR GENERAL ANESTHESIA DURING THEIR MRI PROCEDURE:  IF YOUR DOCTOR PRESCRIBED ORAL SEDATION (take medicine to help you relax during your exam):   You must get the medicine from your doctor (oral medication) before you arrive. Bring the medicine to the exam. Do not take it at home. You ll be told when to take it upon arriving for your exam.   Arrive one hour early. Bring someone who can take you home after the test. Your medicine will make you sleepy. After the exam, you may not drive, take a bus or take a taxi by yourself.  IF YOUR DOCTOR PRESCRIBED IV SEDATION:   Arrive one hour early. Bring someone who can take you home after the test. Your medicine will make you sleepy. After the exam, you may not drive, take a bus or take a taxi by yourself.   No eating 6 hours before your exam. You may have clear liquids up until 4 hours before your exam. (Clear liquids include water, clear tea, black coffee and fruit juice without pulp.)  IF YOUR DOCTOR PRESCRIBED ANESTHESIA (be asleep for your exam):   Arrive 1 1/2 hours early. Bring someone who can take you home after the test. You may not drive, take a bus or take a taxi by yourself.   No eating 8 hours before your exam. You may have clear liquids up until 4 hours before your exam. (Clear liquids include water, clear tea, black coffee and fruit juice without pulp.)   You will spend four to five hours in the recovery room.  Please call the Imaging Department at your exam site with any questions.            May 03, 2019 10:45 AM CDT   (Arrive by 10:30 AM)   Return Visit with Lauro Shaw MD   [facility] ([facility] [place])    [address] [facility]  [address] [place] [address] [phone]              Who to contact     Please  call your clinic at [phone] to:    Ask questions about your health    Make or cancel appointments    Discuss your medicines    Learn about your test results    Speak to your doctor            Additional Information About Your Visit        [employer] Information     [employer] gives you secure access to your electronic health record. If you see a primary care provider, you can also send messages to your care team and make appointments. If you have questions, please call your primary care clinic.  If you do not have a primary care provider, please call [phone] and they will assist you.      [employer] is an electronic gateway that provides easy, online access to your medical records. With [employer], you can request a clinic appointment, read your test results, renew a prescription or communicate with your care team.     To access your existing account, please contact your [facility] or call [phone] for assistance.        Care EveryWhere ID     This is your Care EveryWhere ID. This could be used by other organizations to access your [facility] medical records  [id number]        Your Vitals Were     Pulse BMI (Body Mass Index)                71 25.21 kg/m2           Blood Pressure from Last 3 Encounters:   08/24/18 111/72   08/01/18 120/79   07/11/18 127/87    Weight from Last 3 Encounters:   08/24/18 79.7 kg (175 lb 11.2 oz)   08/01/18 83.4 kg (183 lb 12.8 oz)   07/11/18 86.5 kg (190 lb 11.2 oz)              Today, you had the following     No orders found for display         Today's Medication Changes          These changes are accurate as of 8/24/18 11:59 PM.  If you have any questions, ask your nurse or doctor.               These medicines have changed or have updated prescriptions.        Dose/Directions    cyanocobalamin 1000 MCG tablet   Commonly known as:  vitamin  B-12   This may have changed:  when to take this   Used for:  Alcoholic cirrhosis of liver with ascites (H)         Dose:  1000 mcg   1 tablet (1,000 mcg) by Per G Tube route daily   Quantity:  130 tablet   Refills:  2                Primary Care Provider Office Phone # Fax #    King Louis [phone]       [address] [address]        Equal Access to Services     GISSELTHIEN MARV : Hadii aad ku hadasho Soomaali, waaxda luqadaha, qaybta kaalmada adeegyada, waxclementine sweta fangn melindasiobhan martinezdenise tamayo. So [facility] [phone].    ATENCIÓN: Si habla español, tiene a chiang disposición servicios gratuitos de asistencia lingüística. Llame al [phone].    We comply with applicable federal civil rights laws and [place] laws. We do not discriminate on the basis of race, color, national origin, age, disability, sex, sexual orientation, or gender identity.            Thank you!     Thank you for choosing [facility]  for your care. Our goal is always to provide you with excellent care. Hearing back from our patients is one way we can continue to improve our services. Please take a few minutes to complete the written survey that you may receive in the mail after your visit with us. Thank you!             Your Updated Medication List - Protect others around you: Learn how to safely use, store and throw away your medicines at www.disposemymeds.org.          This list is accurate as of 8/24/18 11:59 PM.  Always use your most recent med list.                   Brand Name Dispense Instructions for use Diagnosis    blood glucose monitoring test strip    ONETOUCH ULTRA    400 each    Use to test blood sugars 4 times daily as needed or as directed.    Diabetes mellitus, type 2 (H)       Calcium Carb-Cholecalciferol 500-400 MG-UNIT Tabs    calcium 500 +D    90 tablet    Take 500 mg by mouth daily    Malnutrition (H)       CANE, ANY MATERIAL     1 each    One cane    Balance disorder       ciclopirox 0.77 % cream    LOPROX    90 g    Apply topically 2 times daily To feet and toenails.     Tinea pedis of both feet       cyanocobalamin 1000 MCG tablet    vitamin  B-12    130 tablet    1 tablet (1,000 mcg) by Per G Tube route daily    Alcoholic cirrhosis of liver with ascites (H)       dulaglutide 1.5 MG/0.5ML pen    TRULICITY    6 mL    Inject 1.5 mg Subcutaneous every 7 days    DM type 2, goal HbA1c 7%-8% (H)       ferrous sulfate 325 (65 Fe) MG tablet    IRON    200 tablet    Take 1 tablet (325 mg) by mouth 2 times daily    Other iron deficiency anemia       furosemide 20 MG tablet    LASIX    30 tablet    Take 1 tablet (20 mg) by mouth daily    Other ascites       gabapentin 100 MG capsule    NEURONTIN    30 capsule    Take 1 capsule (100 mg) by mouth At Bedtime    Mild episode of recurrent major depressive disorder (H)       HYDROcodone-acetaminophen 5-325 MG per tablet    NORCO    60 tablet    Take 2 tablets by mouth every 6 hours as needed for moderate to severe pain    Brain tumor (H)       hydrOXYzine 25 MG tablet    ATARAX    180 tablet    Take 1 tablet (25 mg) by mouth 2 times daily    Itching, Anxiety       lactulose 10 GM/15ML solution    CHRONULAC    946 mL    Take 15 mLs (10 g) by mouth daily    Hepatic encephalopathy (H)       levothyroxine 88 MCG tablet    SYNTHROID/LEVOTHROID    90 tablet    Take 1 tablet (88 mcg) by mouth daily    Hypothyroidism       lidocaine 4 % Crea cream    LMX4    133 g    Apply topically once as needed for mild pain    Port catheter in place       * methylphenidate 10 MG tablet    RITALIN    60 tablet    Take 1 tablet (10 mg) by mouth 2 times daily    Attention deficit hyperactivity disorder (ADHD), unspecified ADHD type       * methylphenidate 10 MG tablet   Start taking on:  9/12/2018    RITALIN    60 tablet    Take 1 tablet (10 mg) by mouth 2 times daily    Attention deficit hyperactivity disorder (ADHD), unspecified ADHD type       mirtazapine 45 MG tablet    REMERON    30 tablet    Take 1 tablet (45 mg) by mouth At Bedtime    Major depressive disorder with  single episode, in partial remission (H)       multivitamin, therapeutic with minerals Tabs tablet      Take 1 tablet by mouth 2 times daily        mupirocin 2 % ointment    BACTROBAN    22 g    Use in nose 2-3 times per week    Epistaxis       omeprazole 20 MG CR capsule    priLOSEC    360 capsule    Take 2 capsules (40 mg) by mouth 2 times daily    Gastroesophageal reflux disease without esophagitis       ONETOUCH LANCETS Misc     100 each    by In Vitro route 4 times daily as needed    Type II or unspecified type diabetes mellitus without mention of complication, not stated as uncontrolled       phenytoin 30 MG CR capsule    DILANTIN    720 capsule    Take 4 capsules (120 mg) by mouth 2 times daily    Oligoastrocytoma of parietal lobe (H)       pravastatin 80 MG tablet    PRAVACHOL    90 tablet    Take 1 tablet (80 mg) by mouth daily    High cholesterol       spironolactone 25 MG tablet    ALDACTONE    180 tablet    Take 2 tablets (50 mg) by mouth daily    Other ascites       thiamine 100 MG tablet     100 tablet    Take 1 tablet (100 mg) by mouth daily    Alcoholic cirrhosis of liver with ascites (H)       traZODone 50 MG tablet    DESYREL    30 tablet    Take 1 tablet (50 mg) by mouth nightly as needed for sleep    Primary insomnia       XIFAXAN 550 MG Tabs tablet   Generic drug:  rifaximin     60 tablet    TAKE ONE TABLET BY MOUTH TWICE DAILY    Hepatic encephalopathy (H)       * Notice:  This list has 2 medication(s) that are the same as other medications prescribed for you. Read the directions carefully, and ask your doctor or other care provider to review them with you.

## 2018-08-24 NOTE — NURSING NOTE
Chief Complaint   Patient presents with     Mass     pt has a lump by his right testicle     Medication Request     pt needs med refills       Whitney Garcia CMA at 2:39 PM on 8/24/2018.

## 2018-08-24 NOTE — PROGRESS NOTES
[facility]  Primary Care Center   King Louis MD  08/24/2018      Chief Complaint:   Mass and Medication Request     History of Present Illness:   Mono Varghese is a 47 year old male with a history of type 2 diabetes, liver failure, ascites due to alcoholic cirrhosis, GERD, Pancytopenia, and other medical problems who presents for evaluation of mass and medication request.    Weight loss: They are concerned about his weight loss recently. His wife notes that he does not have a large appetite. Further, he has started to lose strength because of this and his work will not let him return until his endurance is increased. However, his wife notes that his blood sugars are still normal, unless he eats candy at which they are slightly elevated.     Strange Movements: They are concerned about strange movements including mouth sucking and finger movements. However they none of the medications he is on appear to cause this. They are wondering if it is from a neurological problem. However, they have an appointment set up with his neurologist in 2 weeks for further evaluation.     Prostate lump: They found a lump next to right testicle that is fairly large, and the home care nurse said he should have it checked out. He states it is mildly tender to touch. He had a hernia a while back, however they did not do anything about it. They believe this is the same thing, it has just grown.     Other concerns discussed:  - He is doing 8 L of paracentesis every two weeks.      Review of Systems:   Pertinent items are noted in HPI, remainder of complete ROS is negative.      Active Medications:      Calcium Carb-Cholecalciferol (CALCIUM 500 +D) 500-400 MG-UNIT TABS, Take 500 mg by mouth daily, Disp: 90 tablet, Rfl: 1     ciclopirox (LOPROX) 0.77 % cream, Apply topically 2 times daily To feet and toenails. (Patient not taking: Reported on 8/1/2018), Disp: 90 g, Rfl: 4     cyanocobalamin (VITAMIN  B-12) 1000 MCG tablet, 1 tablet  (1,000 mcg) by Per G Tube route daily (Patient taking differently: 1,000 mcg by Per G Tube route every morning ), Disp: 130 tablet, Rfl: 2     dulaglutide (TRULICITY) 1.5 MG/0.5ML pen, Inject 1.5 mg Subcutaneous every 7 days, Disp: 6 mL, Rfl: 1     ferrous sulfate (IRON) 325 (65 FE) MG tablet, Take 1 tablet (325 mg) by mouth 2 times daily, Disp: 200 tablet, Rfl: 3     furosemide (LASIX) 20 MG tablet, Take 1 tablet (20 mg) by mouth daily, Disp: 30 tablet, Rfl: 3     gabapentin (NEURONTIN) 100 MG capsule, Take 1 capsule (100 mg) by mouth At Bedtime, Disp: 30 capsule, Rfl: 3     HYDROcodone-acetaminophen (NORCO) 5-325 MG per tablet, Take 2 tablets by mouth every 6 hours as needed for moderate to severe pain, Disp: 60 tablet, Rfl: 0     hydrOXYzine (ATARAX) 25 MG tablet, Take 1 tablet (25 mg) by mouth 2 times daily, Disp: 180 tablet, Rfl: 1     lactulose (CHRONULAC) 10 GM/15ML solution, Take 15 mLs (10 g) by mouth daily, Disp: 946 mL, Rfl: 3     levothyroxine (SYNTHROID/LEVOTHROID) 88 MCG tablet, Take 1 tablet (88 mcg) by mouth daily, Disp: 90 tablet, Rfl: 1     lidocaine (LMX4) 4 % CREA cream, Apply topically once as needed for mild pain, Disp: 133 g, Rfl: 1     methylphenidate (RITALIN) 10 MG tablet, Take 1 tablet (10 mg) by mouth 2 times daily (Patient not taking: Reported on 8/1/2018), Disp: 60 tablet, Rfl: 0     [START ON 9/12/2018] methylphenidate (RITALIN) 10 MG tablet, Take 1 tablet (10 mg) by mouth 2 times daily (Patient not taking: Reported on 8/1/2018), Disp: 60 tablet, Rfl: 0     mirtazapine (REMERON) 45 MG tablet, Take 1 tablet (45 mg) by mouth At Bedtime, Disp: 30 tablet, Rfl: 2     multivitamin, therapeutic with minerals (THERA-VIT-M) TABS, Take 1 tablet by mouth 2 times daily, Disp: , Rfl:      mupirocin (BACTROBAN) 2 % ointment, Use in nose 2-3 times per week (Patient not taking: Reported on 8/1/2018), Disp: 22 g, Rfl: 0     omeprazole (PRILOSEC) 20 MG CR capsule, Take 2 capsules (40 mg) by mouth 2  times daily, Disp: 360 capsule, Rfl: 3     phenytoin (DILANTIN) 30 MG CR capsule, Take 4 capsules (120 mg) by mouth 2 times daily, Disp: 720 capsule, Rfl: 11     pravastatin (PRAVACHOL) 80 MG tablet, Take 1 tablet (80 mg) by mouth daily, Disp: 90 tablet, Rfl: 3     spironolactone (ALDACTONE) 25 MG tablet, Take 2 tablets (50 mg) by mouth daily, Disp: 180 tablet, Rfl: 1     thiamine (VITAMIN B-1) 100 MG tablet, Take 1 tablet (100 mg) by mouth daily, Disp: 100 tablet, Rfl: 1     traZODone (DESYREL) 50 MG tablet, Take 1 tablet (50 mg) by mouth nightly as needed for sleep, Disp: 30 tablet, Rfl: 2     XIFAXAN 550 MG TABS tablet, TAKE ONE TABLET BY MOUTH TWICE DAILY, Disp: 60 tablet, Rfl: 11      Allergies:   Latex; No clinical screening - see comments; and Tegaderm transparent dressing (informational only)      Past Medical History:  ADHD  Alcoholic cirrhosis of liver with ascites  Chronic pain  Depressive disorder  Encephalopathy, hepatic  GERD (gastroesophageal reflux disease)  GI bleed  oligosatrocytoma of parietal lobe  LENA  Hyperlipidemia, LDL goal <100  hypothyroidism   Liver failure  Moderate major depression  liver failure   Obesity  Pancytopenia  Partial epilepsy  Hypertensive gastropathy  Portal vein thrombosis  Pulmonary nodules  Type 2 diabetes  Rectal bleeding  Thrombocytopenia  Myopic astigmatism bilateral  Presbyopia  Ringing ears  Pancytopenia  Hepatic encephalopathy     Past Surgical History:  Colonoscopy  Esophagoscopy, gastroscopy, duodenoscopy, combined x8  Optical tracking system craniotomy, excise tumor, combined  Orthopedic surgery  Sigmoidoscopy  Surgery  Sigmoidoscopy flexible x2    Family History:   Adopted: yes - medical history unknown for rest of family  Cirrhosis - father      Social History:   This patient was accompanied by: wife and mother in law  Smoking status: never  Smokeless tobacco: never  Alcohol use: no, quit 01/2014     Physical Exam:   /72  Pulse 71  Wt 79.7 kg (175 lb  11.2 oz)  BMI 25.21 kg/m2   Constitutional: Alert. In no distress.  Head: Normocephalic.   ENT: Mucous membranes are moist.   Respiratory: Normal rate and effort.  Musculoskeletal: No gross deformities noted.   Psychiatric: Mentation with memory loss. fatigue affect.    Neuro: pateint is having repetitive movements of face and hands.  :right inguinal hernias which is reducible and non tender, normal scrotum, penis,  testes normal, rectal exam normal prostate normal       Assessment and Plan:  Hernia  Follow up if painful.    Liver failure  Continue current meds and frequent tab. And endoscopies for varices.     Abnormal movements  Possibly medication relatedof seizure med (I discussed w/ pharmD) vs from his brain tumor or its treatments. He sees his neurologist the week after next.      Follow-up: 2 weeks.      1/2 hr visit over half couns/coord care        Scribe Disclosure:   I, Susan Livingston, am serving as a scribe to document services personally performed by King Louis MD at this visit, based upon the provider's statements to me. All documentation has been reviewed by the aforementioned provider prior to being entered into the official medical record.     Portions of this medical record were completed by a scribe. UPON MY REVIEW AND AUTHENTICATION BY ELECTRONIC SIGNATURE, this confirms (a) I performed the applicable clinical services, and (b) the record is accurate.   King Louis MD

## 2018-08-24 NOTE — PATIENT INSTRUCTIONS
[facility] Medication Refill Request Information:  * Please contact your pharmacy regarding ANY request for medication refills.  ** [facility] Prescription Fax = [phone]  * Please allow 3 business days for routine medication refills.  * Please allow 5 business days for controlled substance medication refills.     [facility] Test Result notification information:  *You will be notified with in 7-10 days of your appointment day regarding the results of your test.  If you are on MyChart you will be notified as soon as the provider has reviewed the results and signed off on them.    [facility]: [phone]

## 2018-09-03 NOTE — IP AVS SNAPSHOT
MRN:[id number]                      After Visit Summary   9/3/2018    Mono Varghese    MRN: [id number]           Thank you!     Thank you for choosing [facility] for your care. Our goal is always to provide you with excellent care. Hearing back from our patients is one way we can continue to improve our services. Please take a few minutes to complete the written survey that you may receive in the mail after you visit with us. Thank you!        Patient Information     Date Of Birth          1970        Designated Caregiver       Most Recent Value    Caregiver    Will someone help with your care after discharge? yes    Name of designated caregiver travis    Phone number of caregiver [phone]    Caregiver address Same as patient      About your hospital stay     You were admitted on:  September 4, 2018 You last received care in the:  [address] [facility]    You were discharged on:  September 6, 2018        Reason for your hospital stay       Rectal bleeding                  Who to Call     For medical emergencies, please call 911.  For non-urgent questions about your medical care, please call your primary care provider or clinic, [phone]          Attending Provider     Provider Specialty    Edis Stevens MD Emergency Medicine    Eleni Winston MD Nephrology    Gianni Howell MD Internal Medicine       Primary Care Provider Office Phone # Fax #    [place] [phone]      After Care Instructions     Activity       Your activity upon discharge: activity as tolerated            Diet       Follow this diet upon discharge: Orders Placed This Encounter      Calorie Counts      2 Gram Sodium Diet            Discharge Instructions       GI will arrange a follow-up appointment to 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 Call [phone] if you haven't heard regarding these appointments within 7 days of discharge.                  Your next 10 appointments already scheduled     Oct 01, 2018   Procedure with Guru Jose Kelly MD   [facility], [place], Same Day Surgery (--)    [address] [facility] [address] [phone]            Oct 02, 2018 10:00 AM CDT   Adult Med Follow UP with Leroy Alcaraz MD   [facility] ([facility])    [place] [id number]  [address] [id number] [phone]            Nov 21, 2018 10:45 AM CST   MR ABDOMEN W/O & W CONTRAST with [address] [facility] MRI ([facility])    [address] [id number] [phone]           Take your medicines as usual, unless your doctor tells you not to. Bring a list of your current medicines to your exam (including vitamins, minerals and over-the-counter drugs). Also bring the results of similar scans you may have had.    You may or may not receive IV contrast for this exam pending the discretion of the Radiologist.   Do not eat or drink for 6 hours prior to exam.  The MRI machine uses a strong magnet. Please wear clothes without metal (snaps, zippers). A sweatsuit works well, or we may give you a hospital gown.  Please remove any body piercings and hair extensions before you arrive. You will also remove watches, jewelry, hairpins, wallets, dentures, partial dental plates and hearing aids. You may wear contact lenses, and you may be able to wear your rings. We have a safe place to  keep your personal items, but it is safer to leave them at home.  **IMPORTANT** THE INSTRUCTIONS BELOW ARE ONLY FOR THOSE PATIENTS WHO HAVE BEEN PRESCRIBED SEDATION OR GENERAL ANESTHESIA DURING THEIR MRI PROCEDURE:  IF YOUR DOCTOR PRESCRIBED ORAL SEDATION (take medicine to help you relax during your exam):   You must get the medicine from your doctor (oral medication) before you arrive. Bring the medicine to the exam. Do not take it at home. You ll be told when to take it upon arriving for your exam.   Arrive one hour early. Bring someone who can take you home after the test. Your medicine will make you sleepy. After the exam, you may not drive, take a bus or take a taxi by yourself.  IF YOUR DOCTOR PRESCRIBED IV SEDATION:   Arrive one hour early. Bring someone who can take you home after the test. Your medicine will make you sleepy. After the exam, you may not drive, take a bus or take a taxi by yourself.   No eating 6 hours before your exam. You may have clear liquids up until 4 hours before your exam. (Clear liquids include water, clear tea, black coffee and fruit juice without pulp.)  IF YOUR DOCTOR PRESCRIBED ANESTHESIA (be asleep for your exam):   Arrive 1 1/2 hours early. Bring someone who can take you home after the test. You may not drive, take a bus or take a taxi by yourself.   No eating 8 hours before your exam. You may have clear liquids up until 4 hours before your exam. (Clear liquids include water, clear tea, black coffee and fruit juice without pulp.)   You will spend four to five hours in the recovery room.  If you have any questions, please contact your Imaging Department exam site.            Feb 13, 2019  9:00 AM CST   Lab with  LAB   [facility] Lab ([facility])    [address] [id number] [phone]            Feb 13, 2019 10:00 AM CST   (Arrive by 9:45 AM)   Return General Liver with Beatriz Tanner MD   [facility]  Hepatology ([facility])    [address]  [address]  [facility] [address] [phone]            Apr 26, 2019 10:00 AM CDT   MR BRAIN W/O & W CONTRAST with UCMR1   [facility] MRI ([facility])    [address]  1st Floor  [facility] [address]   [phone]           Take your medicines as usual, unless your doctor tells you not to. Bring a list of your current medicines to your exam (including vitamins, minerals and over-the-counter drugs).  You may or may not receive intravenous (IV) contrast for this exam pending the discretion of the Radiologist.  You do not need to do anything special to prepare.  The MRI machine uses a strong magnet. Please wear clothes without metal (snaps, zippers). A sweatsuit works well, or we may give you a hospital gown.  Please remove any body piercings and hair extensions before you arrive. You will also remove watches, jewelry, hairpins, wallets, dentures, partial dental plates and hearing aids. You may wear contact lenses, and you may be able to wear your rings. We have a safe place to keep your personal items, but it is safer to leave them at home.  **IMPORTANT** THE INSTRUCTIONS BELOW ARE ONLY FOR THOSE PATIENTS WHO HAVE BEEN PRESCRIBED SEDATION OR GENERAL ANESTHESIA DURING THEIR MRI PROCEDURE:  IF YOUR DOCTOR PRESCRIBED ORAL SEDATION (take medicine to help you relax during your exam):   You must get the medicine from your doctor (oral medication) before you arrive. Bring the medicine to the exam. Do not take it at home. You ll be told when to take it upon arriving for your exam.   Arrive one hour early. Bring someone who can take you home after the test. Your medicine will make you sleepy. After the exam, you may not drive, take a bus or take a taxi by yourself.  IF YOUR DOCTOR PRESCRIBED IV SEDATION:   Arrive one hour early. Bring someone who can take you home after the test. Your medicine  will make you sleepy. After the exam, you may not drive, take a bus or take a taxi by yourself.   No eating 6 hours before your exam. You may have clear liquids up until 4 hours before your exam. (Clear liquids include water, clear tea, black coffee and fruit juice without pulp.)  IF YOUR DOCTOR PRESCRIBED ANESTHESIA (be asleep for your exam):   Arrive 1 1/2 hours early. Bring someone who can take you home after the test. You may not drive, take a bus or take a taxi by yourself.   No eating 8 hours before your exam. You may have clear liquids up until 4 hours before your exam. (Clear liquids include water, clear tea, black coffee and fruit juice without pulp.)   You will spend four to five hours in the recovery room.  Please call the Imaging Department at your exam site with any questions.            May 03, 2019 10:45 AM CDT   (Arrive by 10:30 AM)   Return Visit with Lauro Shaw MD   [facility] ([facility])    [address] [place] [address]  [address] [place] [address] [phone]              Additional Services     Home Care PT Referral for Hospital Discharge       PT to eval and treat    Your provider has ordered home care - physical therapy. If you have not been contacted within 2 days of your discharge please call the department phone number listed on the top of this document.            Home Care PT Referral for Hospital Discharge       PT to eval and treat , patient had home PT set up and then was hospitalized     Your provider has ordered home care - physical therapy. If you have not been contacted within 2 days of your discharge please call the department phone number listed on the top of this document.            Home care nursing referral       [facility] #[phone]  Skilled nursing visits to monitor cardiac and resp status  Monitor hydration, nutrition and bowel status  Medication set up      Your provider has ordered home care nursing services. If  "you have not been contacted within 2 days of your discharge please call the inpatient department phone number at [phone] .                  Pending Results     Date and Time Order Name Status Description    9/5/2018 1201 Copper level In process     9/5/2018 1201 Zinc In process     9/4/2018 0746 Blood culture Preliminary     9/4/2018 0746 Blood culture Preliminary     9/4/2018 0744 fluid culture - Aerobic Bacterial Preliminary             Statement of Approval     Ordered          09/06/18 7613  I have reviewed and agree with all the recommendations and orders detailed in this document.  EFFECTIVE NOW     Approved and electronically signed by:  Maribell Bolden MD             Admission Information     Date & Time Provider Department Dept. Phone    9/3/2018 Gianni Howell MD [address] [facility] [place] [phone]      Your Vitals Were     Blood Pressure Pulse Temperature Respirations Height Weight    109/94 (BP Location: Left arm) 59 97.2  F (36.2  C) (Oral) 18 1.778 m (5' 10\") 80.5 kg (177 lb 7.5 oz)    Pulse Oximetry BMI (Body Mass Index)                99% 25.46 kg/m2          [facility] Information     [employer] gives you secure access to your electronic health record. If you see a primary care provider, you can also send messages to your care team and make appointments. If you have questions, please call your primary care clinic.  If you do not have a primary care provider, please call [phone] and they will assist you.        Care EveryWhere ID     This is your Care EveryWhere ID. This could be used by other organizations to access your [facility] medical records  [id number]        Equal Access to Services     THIEN FAIR : Hadward Thomas, waaxda luqadaha, qaybta kaalmada josue cason. So [facility] [phone].    ATENCIÓN: Si habla español, tiene a chiang disposición servicios gratuitos de asistencia lingüística. Llame al [phone].    We comply with " applicable federal civil rights laws and [place] laws. We do not discriminate on the basis of race, color, national origin, age, disability, sex, sexual orientation, or gender identity.               Review of your medicines      START taking        Dose / Directions    ciprofloxacin 500 MG tablet   Commonly known as:  CIPRO   Indication:  Infection Within the Abdomen   Used for:  Gastrointestinal hemorrhage, unspecified gastrointestinal hemorrhage type        Dose:  500 mg   Take 1 tablet (500 mg) by mouth 2 times daily for 2 days   Quantity:  4 tablet   Refills:  0         CONTINUE these medicines which may have CHANGED, or have new prescriptions. If we are uncertain of the size of tablets/capsules you have at home, strength may be listed as something that might have changed.        Dose / Directions    cyanocobalamin 1000 MCG tablet   Commonly known as:  vitamin  B-12   This may have changed:  when to take this   Used for:  Alcoholic cirrhosis of liver with ascites (H)        Dose:  1000 mcg   1 tablet (1,000 mcg) by Per G Tube route daily   Quantity:  130 tablet   Refills:  2       lactulose 10 GM/15ML solution   Commonly known as:  CHRONULAC   This may have changed:    - how much to take  - when to take this   Used for:  Hepatic encephalopathy (H)        Dose:  20 g   Take 30 mLs (20 g) by mouth 3 times daily   Quantity:  946 mL   Refills:  3         CONTINUE these medicines which have NOT CHANGED        Dose / Directions    blood glucose monitoring test strip   Commonly known as:  ONETOUCH ULTRA   Used for:  Diabetes mellitus, type 2 (H)        Use to test blood sugars 4 times daily as needed or as directed.   Quantity:  400 each   Refills:  1       Calcium Carb-Cholecalciferol 500-400 MG-UNIT Tabs   Commonly known as:  calcium 500 +D   Used for:  Malnutrition (H)        Dose:  500 mg   Take 500 mg by mouth daily   Quantity:  90 tablet   Refills:  1       CANE, ANY MATERIAL   Used for:  Balance disorder         One cane   Quantity:  1 each   Refills:  0       ciclopirox 0.77 % cream   Commonly known as:  LOPROX   Used for:  Tinea pedis of both feet        Apply topically 2 times daily To feet and toenails.   Quantity:  90 g   Refills:  4       dulaglutide 1.5 MG/0.5ML pen   Commonly known as:  TRULICITY   Used for:  DM type 2, goal HbA1c 7%-8% (H)        Dose:  1.5 mg   Inject 1.5 mg Subcutaneous every 7 days   Quantity:  6 mL   Refills:  1       ferrous sulfate 325 (65 Fe) MG tablet   Commonly known as:  IRON   Used for:  Other iron deficiency anemia        Dose:  1 tablet   Take 1 tablet (325 mg) by mouth 2 times daily   Quantity:  200 tablet   Refills:  3       furosemide 20 MG tablet   Commonly known as:  LASIX   Used for:  Other ascites        Dose:  20 mg   Take 1 tablet (20 mg) by mouth daily   Quantity:  30 tablet   Refills:  3       gabapentin 100 MG capsule   Commonly known as:  NEURONTIN   Used for:  Mild episode of recurrent major depressive disorder (H)        Dose:  100 mg   Take 1 capsule (100 mg) by mouth At Bedtime   Quantity:  30 capsule   Refills:  3       HYDROcodone-acetaminophen 5-325 MG per tablet   Commonly known as:  NORCO   Used for:  Brain tumor (H)        Dose:  2 tablet   Take 2 tablets by mouth every 6 hours as needed for moderate to severe pain   Quantity:  60 tablet   Refills:  0       hydrOXYzine 25 MG tablet   Commonly known as:  ATARAX   Used for:  Itching, Anxiety        Dose:  25 mg   Take 1 tablet (25 mg) by mouth 2 times daily   Quantity:  180 tablet   Refills:  1       levothyroxine 88 MCG tablet   Commonly known as:  SYNTHROID/LEVOTHROID   Used for:  Hypothyroidism        Dose:  88 mcg   Take 1 tablet (88 mcg) by mouth daily   Quantity:  90 tablet   Refills:  1       lidocaine 4 % Crea cream   Commonly known as:  LMX4   Used for:  Port catheter in place        Apply topically once as needed for mild pain   Quantity:  133 g   Refills:  1       * methylphenidate 10 MG tablet    Commonly known as:  RITALIN   Used for:  Attention deficit hyperactivity disorder (ADHD), unspecified ADHD type        Dose:  10 mg   Take 1 tablet (10 mg) by mouth 2 times daily   Quantity:  60 tablet   Refills:  0       * methylphenidate 10 MG tablet   Commonly known as:  RITALIN   Used for:  Attention deficit hyperactivity disorder (ADHD), unspecified ADHD type        Dose:  10 mg   Start taking on:  9/12/2018   Take 1 tablet (10 mg) by mouth 2 times daily   Quantity:  60 tablet   Refills:  0       mirtazapine 45 MG tablet   Commonly known as:  REMERON   Used for:  Major depressive disorder with single episode, in partial remission (H)        Dose:  45 mg   Take 1 tablet (45 mg) by mouth At Bedtime   Quantity:  30 tablet   Refills:  2       multivitamin, therapeutic with minerals Tabs tablet        Dose:  1 tablet   Take 1 tablet by mouth 2 times daily   Refills:  0       mupirocin 2 % ointment   Commonly known as:  BACTROBAN   Used for:  Epistaxis        Use in nose 2-3 times per week   Quantity:  22 g   Refills:  0       omeprazole 20 MG CR capsule   Commonly known as:  priLOSEC   Used for:  Gastroesophageal reflux disease without esophagitis        Dose:  40 mg   Take 2 capsules (40 mg) by mouth 2 times daily   Quantity:  360 capsule   Refills:  3       ONETOUCH LANCETS Misc   Used for:  Type II or unspecified type diabetes mellitus without mention of complication, not stated as uncontrolled        by In Vitro route 4 times daily as needed   Quantity:  100 each   Refills:  prn       phenytoin 30 MG CR capsule   Commonly known as:  DILANTIN   Used for:  Oligoastrocytoma of parietal lobe (H)        Dose:  120 mg   Take 4 capsules (120 mg) by mouth 2 times daily   Quantity:  720 capsule   Refills:  11       pravastatin 80 MG tablet   Commonly known as:  PRAVACHOL   Used for:  High cholesterol        Dose:  80 mg   Take 1 tablet (80 mg) by mouth daily   Quantity:  90 tablet   Refills:  3       spironolactone 25  MG tablet   Commonly known as:  ALDACTONE   Used for:  Other ascites        Dose:  50 mg   Take 2 tablets (50 mg) by mouth daily   Quantity:  180 tablet   Refills:  1       thiamine 100 MG tablet   Used for:  Alcoholic cirrhosis of liver with ascites (H)        Dose:  100 mg   Take 1 tablet (100 mg) by mouth daily   Quantity:  100 tablet   Refills:  1       traZODone 50 MG tablet   Commonly known as:  DESYREL   Used for:  Primary insomnia        Dose:  50 mg   Take 1 tablet (50 mg) by mouth nightly as needed for sleep   Quantity:  30 tablet   Refills:  2       VITAMIN D (CHOLECALCIFEROL) PO        Dose:  1000 Units   Take 1,000 Units by mouth daily   Refills:  0       XIFAXAN 550 MG Tabs tablet   Used for:  Hepatic encephalopathy (H)   Generic drug:  rifaximin        TAKE ONE TABLET BY MOUTH TWICE DAILY   Quantity:  60 tablet   Refills:  11       * Notice:  This list has 2 medication(s) that are the same as other medications prescribed for you. Read the directions carefully, and ask your doctor or other care provider to review them with you.         Where to get your medicines      These medications were sent to [facility] [address] [address]     Phone:  [phone]     ciprofloxacin 500 MG tablet    lactulose 10 GM/15ML solution                Protect others around you: Learn how to safely use, store and throw away your medicines at www.disposemymeds.org.        ANTIBIOTIC INSTRUCTION     You've Been Prescribed an Antibiotic - Now What?  Your healthcare team thinks that you or your loved one might have an infection. Some infections can be treated with antibiotics, which are powerful, life-saving drugs. Like all medications, antibiotics have side effects and should only be used when necessary. There are some important things you should know about your antibiotic treatment.      Your healthcare team may run tests before you start taking  an antibiotic.    Your team may take samples (e.g., from your blood, urine or other areas) to run tests to look for bacteria. These test can be important to determine if you need an antibiotic at all and, if you do, which antibiotic will work best.      Within a few days, your healthcare team might change or even stop your antibiotic.    Your team may start you on an antibiotic while they are working to find out what is making you sick.    Your team might change your antibiotic because test results show that a different antibiotic would be better to treat your infection.    In some cases, once your team has more information, they learn that you do not need an antibiotic at all. They may find out that you don't have an infection, or that the antibiotic you're taking won't work against your infection. For example, an infection caused by a virus can't be treated with antibiotics. Staying on an antibiotic when you don't need it is more likely to be harmful than helpful.      You may experience side effects from your antibiotic.    Like all medications, antibiotics have side effects. Some of these can be serious.    Let you healthcare team know if you have any known allergies when you are admitted to the hospital.    One significant side effect of nearly all antibiotics is the risk of severe and sometimes deadly diarrhea caused by Clostridium difficile (C. Difficile). This occurs when a person takes antibiotics because some good germs are destroyed. Antibiotic use allows C. diificile to take over, putting patients at high risk for this serious infection.    As a patient or caregiver, it is important to understand your or your loved one's antibiotic treatment. It is especially important for caregivers to speak up when patients can't speak for themselves. Here are some important questions to ask your healthcare team.    What infection is this antibiotic treating and how do you know I have that infection?    What side  effects might occur from this antibiotic?    How long will I need to take this antibiotic?    Is it safe to take this antibiotic with other medications or supplements (e.g., vitamins) that I am taking?     Are there any special directions I need to know about taking this antibiotic? For example, should I take it with food?    How will I be monitored to know whether my infection is responding to the antibiotic?    What tests may help to make sure the right antibiotic is prescribed for me?      Information provided by:  www.cdc.gov/getsmart  U.S. Department of Health and Human Services  Centers for disease Control and Prevention  National Center for Emerging and Zoonotic Infectious Diseases  Division of Healthcare Quality Promotion             Medication List: This is a list of all your medications and when to take them. Check marks below indicate your daily home schedule. Keep this list as a reference.      Medications           Morning Afternoon Evening Bedtime As Needed    blood glucose monitoring test strip   Commonly known as:  ONETOUCH ULTRA   Use to test blood sugars 4 times daily as needed or as directed.                                Calcium Carb-Cholecalciferol 500-400 MG-UNIT Tabs   Commonly known as:  calcium 500 +D   Take 500 mg by mouth daily                                CANE, ANY MATERIAL   One cane                                ciclopirox 0.77 % cream   Commonly known as:  LOPROX   Apply topically 2 times daily To feet and toenails.                                ciprofloxacin 500 MG tablet   Commonly known as:  CIPRO   Take 1 tablet (500 mg) by mouth 2 times daily for 2 days   Last time this was given:  500 mg on 9/6/2018 10:01 AM                                cyanocobalamin 1000 MCG tablet   Commonly known as:  vitamin  B-12   1 tablet (1,000 mcg) by Per G Tube route daily   Last time this was given:  1,000 mcg on 9/6/2018 10:01 AM                                dulaglutide 1.5 MG/0.5ML pen    Commonly known as:  TRULICITY   Inject 1.5 mg Subcutaneous every 7 days                                ferrous sulfate 325 (65 Fe) MG tablet   Commonly known as:  IRON   Take 1 tablet (325 mg) by mouth 2 times daily                                furosemide 20 MG tablet   Commonly known as:  LASIX   Take 1 tablet (20 mg) by mouth daily   Last time this was given:  20 mg on 9/6/2018 10:01 AM                                gabapentin 100 MG capsule   Commonly known as:  NEURONTIN   Take 1 capsule (100 mg) by mouth At Bedtime   Last time this was given:  100 mg on 9/5/2018  8:33 PM                                HYDROcodone-acetaminophen 5-325 MG per tablet   Commonly known as:  NORCO   Take 2 tablets by mouth every 6 hours as needed for moderate to severe pain                                hydrOXYzine 25 MG tablet   Commonly known as:  ATARAX   Take 1 tablet (25 mg) by mouth 2 times daily   Last time this was given:  25 mg on 9/6/2018 10:01 AM                                lactulose 10 GM/15ML solution   Commonly known as:  CHRONULAC   Take 30 mLs (20 g) by mouth 3 times daily   Last time this was given:  20 g on 9/6/2018  6:32 AM                                levothyroxine 88 MCG tablet   Commonly known as:  SYNTHROID/LEVOTHROID   Take 1 tablet (88 mcg) by mouth daily   Last time this was given:  88 mcg on 9/6/2018 10:01 AM                                lidocaine 4 % Crea cream   Commonly known as:  LMX4   Apply topically once as needed for mild pain                                * methylphenidate 10 MG tablet   Commonly known as:  RITALIN   Take 1 tablet (10 mg) by mouth 2 times daily   Last time this was given:  10 mg on 9/6/2018 10:11 AM                                * methylphenidate 10 MG tablet   Commonly known as:  RITALIN   Take 1 tablet (10 mg) by mouth 2 times daily   Start taking on:  9/12/2018   Last time this was given:  10 mg on 9/6/2018 10:11 AM                                mirtazapine 45 MG  tablet   Commonly known as:  REMERON   Take 1 tablet (45 mg) by mouth At Bedtime   Last time this was given:  45 mg on 9/5/2018  8:34 PM                                multivitamin, therapeutic with minerals Tabs tablet   Take 1 tablet by mouth 2 times daily                                mupirocin 2 % ointment   Commonly known as:  BACTROBAN   Use in nose 2-3 times per week                                omeprazole 20 MG CR capsule   Commonly known as:  priLOSEC   Take 2 capsules (40 mg) by mouth 2 times daily   Last time this was given:  40 mg on 9/6/2018 10:00 AM                                ONETOUCH LANCETS Misc   by In Vitro route 4 times daily as needed                                phenytoin 30 MG CR capsule   Commonly known as:  DILANTIN   Take 4 capsules (120 mg) by mouth 2 times daily   Last time this was given:  120 mg on 9/6/2018 10:00 AM                                pravastatin 80 MG tablet   Commonly known as:  PRAVACHOL   Take 1 tablet (80 mg) by mouth daily   Last time this was given:  80 mg on 9/6/2018 10:01 AM                                spironolactone 25 MG tablet   Commonly known as:  ALDACTONE   Take 2 tablets (50 mg) by mouth daily   Last time this was given:  50 mg on 9/6/2018 10:02 AM                                thiamine 100 MG tablet   Take 1 tablet (100 mg) by mouth daily   Last time this was given:  100 mg on 9/6/2018 10:01 AM                                traZODone 50 MG tablet   Commonly known as:  DESYREL   Take 1 tablet (50 mg) by mouth nightly as needed for sleep                                VITAMIN D (CHOLECALCIFEROL) PO   Take 1,000 Units by mouth daily   Last time this was given:  1,000 Units on 9/6/2018 10:02 AM                                XIFAXAN 550 MG Tabs tablet   TAKE ONE TABLET BY MOUTH TWICE DAILY   Last time this was given:  550 mg on 9/6/2018 10:01 AM   Generic drug:  rifaximin                                * Notice:  This list has 2 medication(s) that  are the same as other medications prescribed for you. Read the directions carefully, and ask your doctor or other care provider to review them with you.

## 2018-09-03 NOTE — IP AVS SNAPSHOT
[address] [address]    Phone:  [phone]                                       After Visit Summary   9/3/2018    Mono Varghese    MRN: [id number]           After Visit Summary Signature Page     I have received my discharge instructions, and my questions have been answered. I have discussed any challenges I see with this plan with the nurse or doctor.    ..........................................................................................................................................  Patient/Patient Representative Signature      ..........................................................................................................................................  Patient Representative Print Name and Relationship to Patient    ..................................................               ................................................  Date                                            Time    ..........................................................................................................................................  Reviewed by Signature/Title    ...................................................              ..............................................  Date                                                            Time          22EPIC Rev 08/18

## 2018-09-04 PROBLEM — K92.2 GI BLEED: Status: ACTIVE | Noted: 2018-01-01

## 2018-09-04 NOTE — PLAN OF CARE
Problem: Patient Care Overview  Goal: Plan of Care/Patient Progress Review  OT 6B: Cancel - OT consult received, pt getting paracentesis upon attempt. Will check back as able.

## 2018-09-04 NOTE — PROGRESS NOTES
Pt oriented but forgetful.  Pt had 3 loose stools after lactulose with no evidence of bleeding. Voiding.  Up to toilet with assist of 1 and walker. 3L taken off at bedside during paracentesis.

## 2018-09-04 NOTE — ED PROVIDER NOTES
"  History     Chief Complaint   Patient presents with     Rectal Bleeding     HPI  Mono Varghese is a 47 year old male with hx DM2, thrombocytopenia, cirrhosis, portal vein thrombosis who presents with bright red blood per rectum. History from the patient and his wife.  The reports of the patient has had about 8-10 episodes of bright blood per rectum over the past 2 days.  This increased this previous evening. Patient has had no vomiting.  No bloody urination.  No fever, no abdominal pain.  Patient has known esophageal and gastric varices, did have severe bleeding from them a few years ago.  Patient's wife notes that the patient is mildly more confused than usual, has been getting his lactulose and having any bowel movements as previously noted.      I have reviewed the Medications, Allergies, Past Medical and Surgical History, and Social History in the Epic system.    Review of Systems  A complete review of systems was performed with pertinent positives and negatives noted in the HPI, and all other systems negative.     Physical Exam   BP: 123/90  Heart Rate: 83  Temp: 98.5  F (36.9  C)  Resp: 16  Height: 177.8 cm (5' 10\")  Weight: 78.5 kg (173 lb)  SpO2: 95 %      Physical Exam  BP (!) 132/97  Temp 98.5  F (36.9  C) (Oral)  Resp 15  Ht 1.778 m (5' 10\")  Wt 78.5 kg (173 lb)  SpO2 97%  BMI 24.82 kg/m2  General: tired appearing  HENT: MMM, no oropharyngeal lesions  Eyes: PERRL, normal sclerae, no conjunctival pallor  Cardio: RRR, normal heart sounds, extremities well perfused  Resp: Normal work of breathing, clear breath sounds  Abdomen: no tenderness, distended with fluid wave, no rebound, no guarding  Rectal: external hemorrhoids and internal hemorrhoids present but no active hemorrhage from external hemorrhoids visualized, no fissure. Gross blood present.   Neuro: alert and oriented to person, place, situation. Mild confusion. CN II-XII grossly intact. Grossly normal strength and sensation in all " extremities.   MSK: no deformities.   Integumentary/Skin: no rash, normal color  Psych: normal affect, normal behavior    ED Course     ED Course     Procedures        Critical Care time:  none       Labs Ordered and Resulted from Time of ED Arrival Up to the Time of Departure from the ED   CBC WITH PLATELETS DIFFERENTIAL - Abnormal; Notable for the following:        Result Value    WBC 2.1 (*)     RBC Count 3.44 (*)     Hemoglobin 11.3 (*)     Hematocrit 33.6 (*)     Platelet Count 40 (*)     Absolute Neutrophil 1.3 (*)     Absolute Lymphocytes 0.5 (*)     All other components within normal limits   INR - Abnormal; Notable for the following:     INR 1.48 (*)     All other components within normal limits   COMPREHENSIVE METABOLIC PANEL - Abnormal; Notable for the following:     Glucose 159 (*)     Calcium 8.1 (*)     Albumin 2.7 (*)     Protein Total 6.2 (*)     Alkaline Phosphatase 322 (*)     AST 52 (*)     All other components within normal limits   AMMONIA - Abnormal; Notable for the following:     Ammonia 131 (*)     All other components within normal limits   PARTIAL THROMBOPLASTIN TIME   PERIPHERAL IV CATHETER   ABO/RH TYPE AND SCREEN            Assessments & Plan (with Medical Decision Making)   In the ED, the patient was afebrile and hemodynamically stable. History notable for 2 days of frequent bright red blood per rectum. Exam notable for  positive gross blood on TRUDI with internal and external hemorrhoids but without any visualized hemorrhage from external hemorrhoids or from any fissure.      No abdominal pain to suggest SBP.  High suspicion for variceal bleed potential.  IV established, labs sent, octreotide, pantoprazole, TXA given.  Hemoglobin result 11.3, down 1.5 points from previous. Ceftriaxone given for SBP prophylaxis. Ammonia elevated at 131 as likely cause of AMS - lactulose given. Hemodynamics remained stable and wnl through ED stay.     The complete clinical picture is most consistent with  GI bleed. After counseling on the diagnosis, work-up, and treatment plan, the patient was admitted to medicine.       Clinical Impression:  Acute lower GI bleed       Edis Stevens MD  Emergency Medicine       I have reviewed the nursing notes.    I have reviewed the findings, diagnosis, plan and need for follow up with the patient.    New Prescriptions    No medications on file       Final diagnoses:   Gastrointestinal hemorrhage, unspecified gastrointestinal hemorrhage type   Varices, gastric   Cirrhosis of liver with ascites, unspecified hepatic cirrhosis type (H)     I, J Carlos Ramirez, am serving as a trained medical scribe to document services personally performed by Davida Stevens MD, based on the provider's statements to me.   I, Davida Stevens MD, was physically present and have reviewed and verified the accuracy of this note documented by J Carlos Ramirez.    9/3/2018   [facility], [place], EMERGENCY DEPARTMENT     Edis Stevens MD  09/04/18 0313

## 2018-09-04 NOTE — H&P
"[facility]    Internal Medicine History and Physical - [facility] Service       Date of Admission:  9/3/2018    Chief Complaint   Rectal bleeding    History is obtained from the patient, and from his wife.    History of Present Illness   Mono Varghese is a 47 year old male with history of esophageal varices, cirrhosis, portal vein thrombosis, DM2, thrombocytopenia who presented with bright red blood per rectum. History is obtained from the patient and his wife, Yisel. His wife reports that the patient has had about 8-10 episodes of bright blood per rectum over the past three days. The amount of bleeding steadily increased until yesterday evening- wife describes the bleeding as very brisk \"like turning on a faucet\". Patient denies nausea, vomiting, fever, chills, abdominal pain, or hematuria. Patient has known esophageal and gastric varices, did have severe bleeding from them a few years ago. Patient's wife notes that the patient is mildly more confused than usual. He has been getting his lactulose only once per day.      In the ED, exam was notable for gross blood on TRUDI with internal and external hemorrhoids but without any visualized hemorrhage from external hemorrhoids or fissure.     Review of Systems   The 10 point Review of Systems is negative other than noted in the HPI or here.     Past Medical History    I have reviewed this patient's medical history and updated it with pertinent information if needed.   Past Medical History:   Diagnosis Date     ADHD      Alcoholic cirrhosis of liver with ascites (H) 06/17/2015    cirrhosis secondary to alcohol, complicated by variceal bleeding and hepatic encephalopathy      Ascites due to alcoholic cirrhosis (H)     Requiring regular paracentesis.     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 status post subtotal resection by Dr. J Carlos Aranda in early 06/2013. Negative for 1p/19q deletions. S/P Concurrent chemoradiation July 15 through August 23, 2013. Initiated adjuvant oral temozolomide 9/17/13; switch     H/O LENA (obstructive sleep apnea)-moderate 12/18/2012    Pt states this is not currently an issue.     Hyperlipidemia LDL goal <100 8/1/2016     Hypothyroidism 8/1/2016     Liver failure (H)      Moderate major depression (H) 10/3/2012     Obesity      Pancytopenia (H) 8/1/2016    Chronic pancytopenia secondary to liver disease since at least 2012      Partial epilepsy with impairment of consciousness (H) 05/07/2014     Portal hypertensive gastropathy      Portal vein thrombosis 12/18/2015    history of left lower extremity deep vein thrombosis as well as portal vein and superior mesenteric vein thrombosis, late 2015 when he was hospitalized in the ICU and seriously ill temporary IVC filter placed in 12/2015 when he was in the ICU with deep vein thromboses and active bleeding      Pulmonary nodules 6/17/2015     Rectal bleeding      Type 2 diabetes mellitus (H) 10/3/2012      Past Surgical History   I have reviewed this patient's surgical history and updated it with pertinent information if needed.  Past Surgical History:   Procedure Laterality Date     COLONOSCOPY N/A 11/27/2015    Procedure: COMBINED COLONOSCOPY, SINGLE OR MULTIPLE  BIOPSY/POLYPECTOMY BY BIOPSY;  Surgeon: Ran Thurston MD;  Location: [facility]     ESOPHAGOSCOPY, GASTROSCOPY, DUODENOSCOPY (EGD), COMBINED N/A 11/23/2015    Procedure: COMBINED ESOPHAGOSCOPY, GASTROSCOPY, DUODENOSCOPY (EGD);  Surgeon: Rocael Rizvi MD;  Location: [facility] OR     ESOPHAGOSCOPY, GASTROSCOPY, DUODENOSCOPY (EGD), COMBINED N/A 1/25/2017    Procedure: COMBINED ESOPHAGOSCOPY, GASTROSCOPY, DUODENOSCOPY (EGD), BIOPSY SINGLE OR MULTIPLE;  Surgeon: Rocael Tejada MD;  Location: [facility]     ESOPHAGOSCOPY, GASTROSCOPY, DUODENOSCOPY (EGD), COMBINED N/A 3/30/2017    Procedure: COMBINED ESOPHAGOSCOPY, GASTROSCOPY, DUODENOSCOPY (EGD);  Surgeon: Jordana Ramirez MD;  [facility]     ESOPHAGOSCOPY, GASTROSCOPY, DUODENOSCOPY (EGD), COMBINED N/A 1/19/2018    Procedure: COMBINED ESOPHAGOSCOPY, GASTROSCOPY, DUODENOSCOPY (EGD);  Upper Endoscopy with verceal banding; Flexible Sigmoidoscopy;  Surgeon: Guru Jose Kelly MD;  Location: [facility]     ESOPHAGOSCOPY, GASTROSCOPY, DUODENOSCOPY (EGD), COMBINED N/A 2/12/2018    Procedure: COMBINED ESOPHAGOSCOPY, GASTROSCOPY, DUODENOSCOPY (EGD);  Esophagogastroduodenoscopy with variceal banding;  Surgeon: Guru Jose Kelly MD;  Location: [facility]     ESOPHAGOSCOPY, GASTROSCOPY, DUODENOSCOPY (EGD), COMBINED N/A 3/5/2018    Procedure: COMBINED ESOPHAGOSCOPY, GASTROSCOPY, DUODENOSCOPY (EGD);  Esophagogastroduodenoscopy  with banding of varices Latex Allergy ;  Surgeon: Guru Jose Kelly MD;  Location: [facility]     ESOPHAGOSCOPY, GASTROSCOPY, DUODENOSCOPY (EGD), COMBINED N/A 4/16/2018    Procedure: COMBINED ESOPHAGOSCOPY, GASTROSCOPY, DUODENOSCOPY (EGD);  Upper Endoscopy  with esophageal varices banding; Latex Allergy ;  Surgeon: Guru Jose Kelly MD;  Location: [facility]     ESOPHAGOSCOPY, GASTROSCOPY, DUODENOSCOPY (EGD), COMBINED N/A 5/30/2018    Procedure: COMBINED ESOPHAGOSCOPY, GASTROSCOPY,  DUODENOSCOPY (EGD);  upper endoscopy with banding of esophageal varices. *latex Allergy*;  Surgeon: Guru Jose Kelly MD;  Location:  OR     OPTICAL TRACKING SYSTEM CRANIOTOMY, EXCISE TUMOR, COMBINED  6/6/2013    Procedure: COMBINED OPTICAL TRACKING SYSTEM CRANIOTOMY, EXCISE TUMOR;  Left Stealth Guided Craniotomy , Tumor Resection ;  Surgeon: J Carlos Aranda MD;  Location:  OR     ORTHOPEDIC SURGERY      hand surgery- right     SIGMOIDOSCOPY FLEXIBLE N/A 11/24/2015    Procedure: SIGMOIDOSCOPY FLEXIBLE;  Surgeon: Rocael Rizvi MD;  Location:  GI     SIGMOIDOSCOPY FLEXIBLE N/A 1/19/2018    Procedure: SIGMOIDOSCOPY FLEXIBLE;;  Surgeon: Guru Jose Kelly MD;  Location:  OR      Social History   Social History   Substance Use Topics     Smoking status: Never Smoker     Smokeless tobacco: Never Used     Alcohol use No      Comment: Quit 01/2014     Family History   I have reviewed this patient's family history and updated it with pertinent information if needed.   Family History   Problem Relation Age of Onset     Adopted: Yes     Medical History Unknown Mother      Cirrhosis Father      Medical History Unknown Brother      Medical History Unknown Brother      Medical History Unknown Brother      Prior to Admission Medications   Prior to Admission Medications   Prescriptions Last Dose Informant Patient Reported? Taking?   CANE, ANY MATERIAL   No No   Sig: One cane   Calcium Carb-Cholecalciferol (CALCIUM 500 +D) 500-400 MG-UNIT TABS   No No   Sig: Take 500 mg by mouth daily   HYDROcodone-acetaminophen (NORCO) 5-325 MG per tablet   No No   Sig: Take 2 tablets by mouth every 6 hours as needed for moderate to severe pain   ONE TOUCH LANCETS MISC   No No   Sig: by In Vitro route 4 times daily as needed   VITAMIN D, CHOLECALCIFEROL, PO   Yes Yes   Sig: Take 1,000 Units by mouth daily   XIFAXAN 550 MG TABS tablet   No No   Sig: TAKE ONE TABLET BY MOUTH TWICE DAILY   blood  glucose monitoring (ONETOUCH ULTRA) test strip   No No   Sig: Use to test blood sugars 4 times daily as needed or as directed.   ciclopirox (LOPROX) 0.77 % cream   No No   Sig: Apply topically 2 times daily To feet and toenails.   cyanocobalamin (VITAMIN  B-12) 1000 MCG tablet   No No   Si tablet (1,000 mcg) by Per G Tube route daily   Patient taking differently: 1,000 mcg by Per G Tube route every morning    dulaglutide (TRULICITY) 1.5 MG/0.5ML pen   No No   Sig: Inject 1.5 mg Subcutaneous every 7 days   ferrous sulfate (IRON) 325 (65 FE) MG tablet   No No   Sig: Take 1 tablet (325 mg) by mouth 2 times daily   furosemide (LASIX) 20 MG tablet   No No   Sig: Take 1 tablet (20 mg) by mouth daily   gabapentin (NEURONTIN) 100 MG capsule   No No   Sig: Take 1 capsule (100 mg) by mouth At Bedtime   hydrOXYzine (ATARAX) 25 MG tablet   No No   Sig: Take 1 tablet (25 mg) by mouth 2 times daily   lactulose (CHRONULAC) 10 GM/15ML solution   No No   Sig: Take 15 mLs (10 g) by mouth daily   levothyroxine (SYNTHROID/LEVOTHROID) 88 MCG tablet   No No   Sig: Take 1 tablet (88 mcg) by mouth daily   lidocaine (LMX4) 4 % CREA cream   No No   Sig: Apply topically once as needed for mild pain   methylphenidate (RITALIN) 10 MG tablet   No No   Sig: Take 1 tablet (10 mg) by mouth 2 times daily   methylphenidate (RITALIN) 10 MG tablet   No No   Sig: Take 1 tablet (10 mg) by mouth 2 times daily   mirtazapine (REMERON) 45 MG tablet   No No   Sig: Take 1 tablet (45 mg) by mouth At Bedtime   multivitamin, therapeutic with minerals (THERA-VIT-M) TABS  Self Yes No   Sig: Take 1 tablet by mouth 2 times daily   mupirocin (BACTROBAN) 2 % ointment   No No   Sig: Use in nose 2-3 times per week   omeprazole (PRILOSEC) 20 MG CR capsule   No No   Sig: Take 2 capsules (40 mg) by mouth 2 times daily   phenytoin (DILANTIN) 30 MG CR capsule   No No   Sig: Take 4 capsules (120 mg) by mouth 2 times daily   pravastatin (PRAVACHOL) 80 MG tablet   No No    Sig: Take 1 tablet (80 mg) by mouth daily   spironolactone (ALDACTONE) 25 MG tablet   No No   Sig: Take 2 tablets (50 mg) by mouth daily   thiamine (VITAMIN B-1) 100 MG tablet   No No   Sig: Take 1 tablet (100 mg) by mouth daily   traZODone (DESYREL) 50 MG tablet   No No   Sig: Take 1 tablet (50 mg) by mouth nightly as needed for sleep      Facility-Administered Medications: None     Allergies   Allergies   Allergen Reactions     Latex Itching and Rash     No Clinical Screening - See Comments      Coban and Surgilast cause itching     Tegaderm Transparent Dressing (Informational Only)        Physical Exam   Vital Signs: Temp: 97.8  F (36.6  C) Temp src: Oral BP: 125/85   Heart Rate: 79 Resp: 15 SpO2: 93 % O2 Device: None (Room air)    Weight: 173 lbs 0 oz    General: NAD, tired appearing, speaks softly  HENT: MMM, no oropharyngeal lesions  Eyes: PERRL, normal sclerae, no conjunctival pallor  Cardio: RRR, normal heart sounds, extremities well perfused  Resp: Normal work of breathing, clear breath sounds  Abdomen: no tenderness, distended with fluid wave, no rebound, no guarding  Neuro: alert and oriented to person, place, situation. Mild confusion. CN II-XII grossly intact. Grossly normal strength and sensation in all extremities. Possible slight tremor in upper extremities.   MSK: no deformities.   Integumentary/Skin: ? Mild jaundice   Psych: normal affect, normal behavior    Assessment & Plan   Mono Varghese is a 47 year old male with with history of DM2, thrombocytopenia, cirrhosis, portal vein thrombosis who presented with 3 days of multiple episodes of bright red blood per rectum.     # GI Bleed  # Anemia  Patient symptoms and exam are consistent with a GI bleed. No abdominal pain to suggest SBP. High suspicion for variceal bleed, especially considering patient's history. Hemoglobin 11.3, down 1.5 points from previous. Ceftriaxone given for SBP prophylaxis. Ammonia elevated at 131 as likely cause of altered  mental status, and may be more consistent with an upper GI bleed. Lactulose given. Vital signs have been stable.   - [facility] consult, appreciate recs  - NPO  - trend Hgb BID, more often if active bleeding  - Continue Pantoprazole  - Continue Octreotide  - Continue tranexamic acid  - Continue ceftriaxone for SBP prophylaxis    # Altered mental status  Reported by patient's wife that patient is different from baseline and seems more confused. Ammonia elevated at 131 as likely cause of altered mental status.   - Will continue patient on [facility] protocol to assess for hepatic encephalopathy    #  Liver disease with encephalopathy, esophageal varices, portal vein thrombosis, portal hypertensive gastropathy, recurrent ascites  The patient's medical history is significant for esophageal varices, requiring surveillance EGDs.  His most recent EGD with variceal banding took place on 7/11/18. Prior EGDs revealed scars from previous banding procedures, post-panding ulcers in the esophagus, and portal hypertensive gastropathy.    - [facility] protocol  - Continue rifaximin, lactulose protocol with titration to 4-5 loose stools per day  MELD-Na score: 12 at 9/4/2018 12:03 AM  MELD score: 12 at 9/4/2018 12:03 AM  Calculated from:  Serum Creatinine: 0.93 mg/dL (Rounded to 1) at 9/4/2018 12:03 AM  Serum Sodium: 138 mmol/L (Rounded to 137) at 9/4/2018 12:03 AM  Total Bilirubin: 1.2 mg/dL at 9/4/2018 12:03 AM  INR(ratio): 1.48 at 9/4/2018 12:03 AM  Age: 47 years    # Hx of seizures  Diagnosed with left parietal astrocytoma in 2012, now s/p partial resection, chemotherapy and radiation. Treatment followed by seizures.  - Continue phenytoin     Chronic:  # Endo: hypothyroid on synthroid  # Depression: continue home mirtazapine    # Pain Assessment:  Current Pain Score 7/11/2018   Patient currently in pain? denies   Pain score (0-10) -   Pain location -   Pain descriptors -   CPOT pain score -   Mono kincaid pain level was assessed and  he currently denies pain.      Diet:    Fluids: NPO  Lines: PIV  DVT Prophylaxis: Pneumatic Compression Devices  Code Status: DNR / DNI  Dispo: pending clinical course and workup     The patient will be admitted to the [place] [id number] medicine team and staffed in the morning.     Mary Beyer MD MPH  Medicine [facility]   Pager:  [phone]  Please see sticky note for cross cover information      Data     Recent Labs  Lab 09/04/18  0003   WBC 2.1*   HGB 11.3*   MCV 98   PLT 40*   INR 1.48*      POTASSIUM 3.7   CHLORIDE 105   CO2 24   BUN 13   CR 0.93   ANIONGAP 8   UCHE 8.1*   *   ALBUMIN 2.7*   PROTTOTAL 6.2*   BILITOTAL 1.2   ALKPHOS 322*   ALT 36   AST 52*

## 2018-09-04 NOTE — CONSULTS
"Colon and Rectal Surgery Consultation Note  [facility]    Mono Varghese MRN# [id number]   Age: 47 year old YOB: 1970     Date of Admission:  9/3/2018    Reason for consult: \"non-urgent, patient w/ hx of cirrhosis, rectal varices and hemorroids here for BRBPR, HD stable; was seen in colorectal clinic; would a procedure be considered since this is bringing him into the hospital?\"       Requesting physician: Maribell Bolden       Level of consult: Consult, follow and place orders           Assessment:   This is a 48 yo M with severe alcoholic liver disease, known portal hypertension and sequelae (esophageal varices) who is not a transplant candidate due to unresectable oligoastrocytoma. He presents with bright red blood per rectum. This is likely due to hemorrhoids with a possible component of rectal varices. He is hemodynamically stable with Hgb relatively unchanged  ~11 since admission. At this time, we do not recommend operative intervention as the hemorrhoids would be highly likely to recur, and the operation would be complicated by his baseline coagulopathy.          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     He presents with several episodes of passing bright red blood and clots per rectum yesterday and today. Had about 10 bloody stools each day, bleeding increased yesterday evening. Wife says bleeding was \"gushing like a faucet\" and " she though he was peeing into the toilet but it was blood gushing instead. Bleeding is painless, not associated with bowel movements, and not associated with nausea or vomiting. No fevers, chills, or abdominal pain.     Patient has not had any more bloody bowel movements since admission. He feels better and states that the medicine he is receiving (lactulose and octreotide) have helped slow the bleeding. His wife states he resists taking the lactulose at home and is occasionally confused.          Past Medical History:     Past Medical History:   Diagnosis Date     ADHD      Alcoholic cirrhosis of liver with ascites (H) 06/17/2015    cirrhosis secondary to alcohol, complicated by variceal bleeding and hepatic encephalopathy      Ascites due to alcoholic cirrhosis (H)     Requiring regular paracentesis.     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 status post subtotal resection by Dr. J Carlos Aranda in early 06/2013. Negative for 1p/19q deletions. S/P Concurrent chemoradiation July 15 through August 23, 2013. Initiated adjuvant oral temozolomide 9/17/13; switch     H/O LENA (obstructive sleep apnea)-moderate 12/18/2012    Pt states this is not currently an issue.     Hyperlipidemia LDL goal <100 8/1/2016     Hypothyroidism  8/1/2016     Liver failure (H)      Moderate major depression (H) 10/3/2012     Obesity      Pancytopenia (H) 8/1/2016    Chronic pancytopenia secondary to liver disease since at least 2012      Partial epilepsy with impairment of consciousness (H) 05/07/2014     Portal hypertensive gastropathy      Portal vein thrombosis 12/18/2015    history of left lower extremity deep vein thrombosis as well as portal vein and superior mesenteric vein thrombosis, late 2015 when he was hospitalized in the ICU and seriously ill temporary IVC filter placed in 12/2015 when he was in the ICU with deep vein thromboses and active bleeding      Pulmonary nodules 6/17/2015     Rectal bleeding      Type 2 diabetes mellitus (H) 10/3/2012             Past Surgical History:     Past Surgical History:   Procedure Laterality Date     COLONOSCOPY N/A 11/27/2015    Procedure: COMBINED COLONOSCOPY, SINGLE OR MULTIPLE BIOPSY/POLYPECTOMY BY BIOPSY;  Surgeon: Ran Thurston MD;  Location: [facility]     ESOPHAGOSCOPY, GASTROSCOPY, DUODENOSCOPY (EGD), COMBINED N/A 11/23/2015    Procedure: COMBINED ESOPHAGOSCOPY, GASTROSCOPY, DUODENOSCOPY (EGD);  Surgeon: Rocael Rizvi MD;  Location: [facility]     ESOPHAGOSCOPY, GASTROSCOPY, DUODENOSCOPY (EGD), COMBINED N/A 1/25/2017    Procedure: COMBINED ESOPHAGOSCOPY, GASTROSCOPY, DUODENOSCOPY (EGD), BIOPSY SINGLE OR MULTIPLE;  Surgeon: Rocael Tejada MD;  Location: [facility]     ESOPHAGOSCOPY, GASTROSCOPY, DUODENOSCOPY (EGD), COMBINED N/A 3/30/2017    Procedure: COMBINED ESOPHAGOSCOPY, GASTROSCOPY, DUODENOSCOPY (EGD);  Surgeon: Jordana Ramirez MD;  Location: [facility]     ESOPHAGOSCOPY, GASTROSCOPY, DUODENOSCOPY (EGD), COMBINED N/A 1/19/2018    Procedure: COMBINED ESOPHAGOSCOPY, GASTROSCOPY, DUODENOSCOPY (EGD);  Upper Endoscopy with verceal banding; Flexible Sigmoidoscopy;  Surgeon: Guru Jose Kelly MD;  Location: [facility]     ESOPHAGOSCOPY, GASTROSCOPY, DUODENOSCOPY (EGD), COMBINED  N/A 2/12/2018    Procedure: COMBINED ESOPHAGOSCOPY, GASTROSCOPY, DUODENOSCOPY (EGD);  Esophagogastroduodenoscopy with variceal banding;  Surgeon: Guru Jose Kelly MD;  Location: [facility]     ESOPHAGOSCOPY, GASTROSCOPY, DUODENOSCOPY (EGD), COMBINED N/A 3/5/2018    Procedure: COMBINED ESOPHAGOSCOPY, GASTROSCOPY, DUODENOSCOPY (EGD);  Esophagogastroduodenoscopy  with banding of varices Latex Allergy ;  Surgeon: Guru Jose Kelly MD;  Location: [facility]     ESOPHAGOSCOPY, GASTROSCOPY, DUODENOSCOPY (EGD), COMBINED N/A 4/16/2018    Procedure: COMBINED ESOPHAGOSCOPY, GASTROSCOPY, DUODENOSCOPY (EGD);  Upper Endoscopy  with esophageal varices banding; Latex Allergy ;  Surgeon: Guru Jose Kelly MD;  Location:  OR     ESOPHAGOSCOPY, GASTROSCOPY, DUODENOSCOPY (EGD), COMBINED N/A 5/30/2018    Procedure: COMBINED ESOPHAGOSCOPY, GASTROSCOPY, DUODENOSCOPY (EGD);  upper endoscopy with banding of esophageal varices. *latex Allergy*;  Surgeon: Guru Jose Kelly MD;  Location:  OR     OPTICAL TRACKING SYSTEM CRANIOTOMY, EXCISE TUMOR, COMBINED  6/6/2013    Procedure: COMBINED OPTICAL TRACKING SYSTEM CRANIOTOMY, EXCISE TUMOR;  Left Stealth Guided Craniotomy , Tumor Resection ;  Surgeon: J Carlos Aranda MD;  Location:  OR     ORTHOPEDIC SURGERY      hand surgery- right     SIGMOIDOSCOPY FLEXIBLE N/A 11/24/2015    Procedure: SIGMOIDOSCOPY FLEXIBLE;  Surgeon: Rocael Rizvi MD;  Location:  GI     SIGMOIDOSCOPY FLEXIBLE N/A 1/19/2018    Procedure: SIGMOIDOSCOPY FLEXIBLE;;  Surgeon: Guru Jose Kelly MD;  Location:  OR             Social History:     Social History     Social History     Marital status:      Spouse name: Yisel      Number of children: 1      Years of education: N/A     Occupational History            Social History Main Topics     Smoking status: Never Smoker     Smokeless tobacco: Never Used     Alcohol  use No      Comment: Quit 01/2014     Drug use: No     Sexual activity: Not on file     Other Topics Concern     Parent/Sibling W/ Cabg, Mi Or Angioplasty Before 65f 55m? No     Social History Narrative    On disability              Family History:     Family History   Problem Relation Age of Onset     Adopted: Yes     Medical History Unknown Mother      Cirrhosis Father      Medical History Unknown Brother      Medical History Unknown Brother      Medical History Unknown Brother              Allergies:      Allergies   Allergen Reactions     Latex Itching and Rash     No Clinical Screening - See Comments      Coban and Surgilast cause itching     Tegaderm Transparent Dressing (Informational Only)              Medications:     No current facility-administered medications on file prior to encounter.   Current Outpatient Prescriptions on File Prior to Encounter:  blood glucose monitoring (ONETOUCH ULTRA) test strip Use to test blood sugars 4 times daily as needed or as directed.   Calcium Carb-Cholecalciferol (CALCIUM 500 +D) 500-400 MG-UNIT TABS Take 500 mg by mouth daily   CANE, ANY MATERIAL One cane   ciclopirox (LOPROX) 0.77 % cream Apply topically 2 times daily To feet and toenails.   cyanocobalamin (VITAMIN  B-12) 1000 MCG tablet 1 tablet (1,000 mcg) by Per G Tube route daily (Patient taking differently: 1,000 mcg by Per G Tube route every morning )   dulaglutide (TRULICITY) 1.5 MG/0.5ML pen Inject 1.5 mg Subcutaneous every 7 days   ferrous sulfate (IRON) 325 (65 FE) MG tablet Take 1 tablet (325 mg) by mouth 2 times daily   furosemide (LASIX) 20 MG tablet Take 1 tablet (20 mg) by mouth daily   gabapentin (NEURONTIN) 100 MG capsule Take 1 capsule (100 mg) by mouth At Bedtime   HYDROcodone-acetaminophen (NORCO) 5-325 MG per tablet Take 2 tablets by mouth every 6 hours as needed for moderate to severe pain   hydrOXYzine (ATARAX) 25 MG tablet Take 1 tablet (25 mg) by mouth 2 times daily   lactulose (CHRONULAC) 10  "GM/15ML solution Take 15 mLs (10 g) by mouth daily   levothyroxine (SYNTHROID/LEVOTHROID) 88 MCG tablet Take 1 tablet (88 mcg) by mouth daily   lidocaine (LMX4) 4 % CREA cream Apply topically once as needed for mild pain   methylphenidate (RITALIN) 10 MG tablet Take 1 tablet (10 mg) by mouth 2 times daily   [START ON 9/12/2018] methylphenidate (RITALIN) 10 MG tablet Take 1 tablet (10 mg) by mouth 2 times daily   mirtazapine (REMERON) 45 MG tablet Take 1 tablet (45 mg) by mouth At Bedtime   multivitamin, therapeutic with minerals (THERA-VIT-M) TABS Take 1 tablet by mouth 2 times daily   mupirocin (BACTROBAN) 2 % ointment Use in nose 2-3 times per week   omeprazole (PRILOSEC) 20 MG CR capsule Take 2 capsules (40 mg) by mouth 2 times daily   ONE TOUCH LANCETS MISC by In Vitro route 4 times daily as needed   phenytoin (DILANTIN) 30 MG CR capsule Take 4 capsules (120 mg) by mouth 2 times daily   pravastatin (PRAVACHOL) 80 MG tablet Take 1 tablet (80 mg) by mouth daily   spironolactone (ALDACTONE) 25 MG tablet Take 2 tablets (50 mg) by mouth daily   thiamine (VITAMIN B-1) 100 MG tablet Take 1 tablet (100 mg) by mouth daily   traZODone (DESYREL) 50 MG tablet Take 1 tablet (50 mg) by mouth nightly as needed for sleep   XIFAXAN 550 MG TABS tablet TAKE ONE TABLET BY MOUTH TWICE DAILY             Review of Systems:      All other review of systems negative, except for what is mentioned above        Physical Exam:   /85 (BP Location: Left arm)  Temp 97.4  F (36.3  C) (Oral)  Resp 16  Ht 1.778 m (5' 10\")  Wt 78.5 kg (173 lb)  SpO2 98%  BMI 24.82 kg/m2    I/O:  I/O last 3 completed shifts:  In: 65.5 [I.V.:65.5]  Out: -     General: Alert, interactive, NAD, sitting up in bed eating pizza  Resp:  Respirations non-labored on room air  Cardiac:  Pulses 2+ radially  Abdomen: Soft, nontender, nondistended.  No rebound or guarding. Anorectal exam: external hemorrhoids, no internal masses. No bleeding.   Extremities: No LE " edema or obvious joint abnormalities  Skin: Warm and dry, somewhat jaundiced   Neuro: A&O            Data:     BMP  Recent Labs  Lab 09/04/18  0003      POTASSIUM 3.7   CHLORIDE 105   CO2 24   BUN 13   CR 0.93   *     CBC  Recent Labs  Lab 09/04/18  1220 09/04/18  0558 09/04/18  0003   WBC  --  2.0* 2.1*   HGB 11.5* 11.2* 11.3*   HCT  --  33.7* 33.6*   PLT  --  41* 40*         Win Richter MD  General Surgery PGY1  Colon and Rectal Surgery Service  Pager: [phone]

## 2018-09-04 NOTE — CONSULTS
[facility]    Hepatology Consult    Requesting provider:  Dr. 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 Patient reports BRBPR 4 times daily for the last 2 days, small amount of blood each time. Reports similar bleeding in the past. Has 3 BM daily while on lactulose. Per chart, he had a history of rectal bleeding, was evaluated by flex sig 6/2018 which showed internal hemorrhoids and non-thrombosed external hemorrhoids. He was seen by colorectal surgery, and was recommended to managed conservatively given no significant Hgb drop from the rectal bleeding (Hgb stable at 11-12).   He also had several variceal bleeding, most recent episode in 1/2018, s/p banding. Had multiple f/u EGD and last one was 7/2018 s/p 2 bandings.  He denied dizziness/SOB, chest pain, abdominal pain, diarrhea, constipation, nausea/vomiting, hematemesis.  Vital sign stable. Hgb 12.9 -> 11.5, INR 1.5, Plt 41.       Medical hx Surgical hx   Past Medical History:   Diagnosis Date     ADHD      Alcoholic cirrhosis of liver with ascites (H) 06/17/2015    cirrhosis secondary to alcohol, complicated by variceal bleeding and hepatic encephalopathy      Ascites due to alcoholic cirrhosis (H)     Requiring regular paracentesis.     Chronic pain 8/1/2016     Depressive disorder 02/01/2001     Encephalopathy, hepatic (H) 7/29/2017     GERD (gastroesophageal reflux disease)      GIB (gastrointestinal bleeding) 11/23/2015    Admitted to the ICU 11/2015 for hemorrhagic shock 2/2 variceal bleed with subsequent sequelae of shock liver, multi organ dysfunction including  altered mental status of unknown etiology, multiple thrombosis, decompensated liver cirrhosis, hematologic abnormalities, and JOSEF s/p banding at the end of 03/2016      H/O Oligoastrocytoma of parietal lobe (H) 06/11/2013    Presented acute onset of right-sided seizures in 4/2013. 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 status post subtotal resection by Dr. J Carlos Aranda in early 06/2013. Negative for 1p/19q deletions. S/P Concurrent chemoradiation July 15 through August 23, 2013. Initiated adjuvant oral temozolomide 9/17/13; switch     H/O LENA (obstructive sleep apnea)-moderate 12/18/2012    Pt states this is not currently an issue.     Hyperlipidemia LDL goal <100 8/1/2016     Hypothyroidism 8/1/2016     Liver failure (H)      Moderate major depression (H) 10/3/2012     Obesity      Pancytopenia (H) 8/1/2016    Chronic pancytopenia secondary to liver disease since at least 2012      Partial epilepsy with impairment of consciousness (H) 05/07/2014     Portal hypertensive gastropathy      Portal vein thrombosis 12/18/2015    history of left lower extremity deep vein thrombosis as well as portal vein and superior mesenteric vein thrombosis, late 2015 when he was hospitalized in the ICU and seriously ill temporary IVC filter placed in 12/2015 when he was in the ICU with deep vein thromboses and active bleeding      Pulmonary nodules 6/17/2015     Rectal bleeding      Type 2 diabetes mellitus (H) 10/3/2012      Past Surgical History:   Procedure Laterality Date     COLONOSCOPY N/A 11/27/2015    Procedure: COMBINED COLONOSCOPY, SINGLE OR MULTIPLE BIOPSY/POLYPECTOMY BY BIOPSY;  Surgeon: Ran Thurston MD;  Location:  GI     ESOPHAGOSCOPY, GASTROSCOPY, DUODENOSCOPY (EGD), COMBINED N/A 11/23/2015    Procedure: COMBINED ESOPHAGOSCOPY, GASTROSCOPY, DUODENOSCOPY (EGD);  Surgeon: Rocael Rizvi MD;  Location:  OR      ESOPHAGOSCOPY, GASTROSCOPY, DUODENOSCOPY (EGD), COMBINED N/A 1/25/2017    Procedure: COMBINED ESOPHAGOSCOPY, GASTROSCOPY, DUODENOSCOPY (EGD), BIOPSY SINGLE OR MULTIPLE;  Surgeon: Rocael Tejada MD;  Location: [facility]     ESOPHAGOSCOPY, GASTROSCOPY, DUODENOSCOPY (EGD), COMBINED N/A 3/30/2017    Procedure: COMBINED ESOPHAGOSCOPY, GASTROSCOPY, DUODENOSCOPY (EGD);  Surgeon: Jordana Ramirez MD;  Location: [facility]     ESOPHAGOSCOPY, GASTROSCOPY, DUODENOSCOPY (EGD), COMBINED N/A 1/19/2018    Procedure: COMBINED ESOPHAGOSCOPY, GASTROSCOPY, DUODENOSCOPY (EGD);  Upper Endoscopy with verceal banding; Flexible Sigmoidoscopy;  Surgeon: Guru Jose Kelly MD;  Location: [facility]     ESOPHAGOSCOPY, GASTROSCOPY, DUODENOSCOPY (EGD), COMBINED N/A 2/12/2018    Procedure: COMBINED ESOPHAGOSCOPY, GASTROSCOPY, DUODENOSCOPY (EGD);  Esophagogastroduodenoscopy with variceal banding;  Surgeon: Guru Jose Kelly MD;  Location: [facility]     ESOPHAGOSCOPY, GASTROSCOPY, DUODENOSCOPY (EGD), COMBINED N/A 3/5/2018    Procedure: COMBINED ESOPHAGOSCOPY, GASTROSCOPY, DUODENOSCOPY (EGD);  Esophagogastroduodenoscopy  with banding of varices Latex Allergy ;  Surgeon: Guru Jose Kelly MD;  Location: [facility]     ESOPHAGOSCOPY, GASTROSCOPY, DUODENOSCOPY (EGD), COMBINED N/A 4/16/2018    Procedure: COMBINED ESOPHAGOSCOPY, GASTROSCOPY, DUODENOSCOPY (EGD);  Upper Endoscopy  with esophageal varices banding; Latex Allergy ;  Surgeon: Guru Jose Kelly MD;  Location: [facility]     ESOPHAGOSCOPY, GASTROSCOPY, DUODENOSCOPY (EGD), COMBINED N/A 5/30/2018    Procedure: COMBINED ESOPHAGOSCOPY, GASTROSCOPY, DUODENOSCOPY (EGD);  upper endoscopy with banding of esophageal varices. *latex Allergy*;  Surgeon: Guru Jose Kelly MD;  Location: [facility]     OPTICAL TRACKING SYSTEM CRANIOTOMY, EXCISE TUMOR, COMBINED  6/6/2013    Procedure: COMBINED OPTICAL TRACKING SYSTEM CRANIOTOMY,  EXCISE TUMOR;  Left Stealth Guided Craniotomy , Tumor Resection ;  Surgeon: J Carlos Aranda MD;  Location: [facility]     ORTHOPEDIC SURGERY      hand surgery- right     SIGMOIDOSCOPY FLEXIBLE N/A 11/24/2015    Procedure: SIGMOIDOSCOPY FLEXIBLE;  Surgeon: Rocael Rizvi MD;  Location: [facility]     SIGMOIDOSCOPY FLEXIBLE N/A 1/19/2018    Procedure: SIGMOIDOSCOPY FLEXIBLE;;  Surgeon: Guru Jose Kelly MD;  Location: [facility]          Medications  Current Facility-Administered Medications   Medication Dose Route Frequency     calcium carbonate 500 mg-vitamin D 200 units  1 tablet Oral Daily     [START ON 9/5/2018] cefTRIAXone  1 g Intravenous Q24H     cholecalciferol  1,000 Units Oral Daily     cyanocobalamin  1,000 mcg Per G Tube Daily     gabapentin  100 mg Oral At Bedtime     hydrOXYzine  25 mg Oral BID     lactulose  20 g Oral or NG Tube TID     levothyroxine  88 mcg Oral Daily     methylphenidate  10 mg Oral BID     mirtazapine  45 mg Oral At Bedtime     pantoprazole (PROTONIX) IV  40 mg Intravenous BID     phenytoin  120 mg Oral BID     pravastatin  80 mg Oral Daily     rifaximin  550 mg Oral BID     sodium chloride (PF)  3 mL Intracatheter Q8H     spironolactone  50 mg Oral Daily     thiamine  100 mg Oral Daily       Allergies  Allergies   Allergen Reactions     Latex Itching and Rash     No Clinical Screening - See Comments      Coban and Surgilast cause itching     Tegaderm Transparent Dressing (Informational Only)        Family hx Social hx   Family History   Problem Relation Age of Onset     Adopted: Yes     Medical History Unknown Mother      Cirrhosis Father      Medical History Unknown Brother      Medical History Unknown Brother      Medical History Unknown Brother       Social History   Substance Use Topics     Smoking status: Never Smoker     Smokeless tobacco: Never Used     Alcohol use No      Comment: Quit 01/2014          Review of systems  A 10-point review of systems was  "negative.      Examination  /85  Temp 97.8  F (36.6  C) (Oral)  Resp 15  Ht 1.778 m (5' 10\")  Wt 78.5 kg (173 lb)  SpO2 93%  BMI 24.82 kg/m2    Intake/Output Summary (Last 24 hours) at 09/04/18 0815  Last data filed at 09/04/18 0500   Gross per 24 hour   Intake             35.5 ml   Output                0 ml   Net             35.5 ml       Gen- well, NAD, A+Ox3, normal color  Eye- EOMI  ENT- MMM  Lym- no palpable LAD  CVS- RRR  RS- CTA  Abd- mild distention, non tender.   Extr- 1+ pitting edema   Neuro- no asterixis  Skin- no rash  Psych- normal mood  Rectal: patient denied       Laboratory  Lab Results   Component Value Date     09/04/2018    POTASSIUM 3.7 09/04/2018    CHLORIDE 105 09/04/2018    CO2 24 09/04/2018    BUN 13 09/04/2018    CR 0.93 09/04/2018       Lab Results   Component Value Date    BILITOTAL 1.2 09/04/2018    ALT 36 09/04/2018    AST 52 09/04/2018    ALKPHOS 322 09/04/2018       Lab Results   Component Value Date    ALBUMIN 2.7 09/04/2018    PROTTOTAL 6.2 09/04/2018        Lab Results   Component Value Date    WBC 2.0 09/04/2018    HGB 11.2 09/04/2018    MCV 98 09/04/2018    PLT 41 09/04/2018       Lab Results   Component Value Date    INR 1.48 09/04/2018         Assessment    46 yo M with h/o decompensated EtOH liver cirrhosis c/b EV bleeding s/p banding x2 7/2018, HE and ascites (require paracentesis q2 weeks), h/o rectal bleeding, internal hemorrhoids, 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 1 g Hgb drop from 12.9 -> 11.5, Plt 41 and INR 1.5. HD stable.       Recommendations    # Painless BRBPR: 1 g Hgb drop from 12.9 -> 11.5, Plt 41 and INR 1.5. HD stable.  Most likely hemorrhoidal bleeding in the setting of elevated INR and thrombocytopenia.  Had similar episodes in the past, flex sign 6/2018 showed internal hemorrhoids and prominent rectal vein. Has been evaluated " by colorectal surgery as outpatient and recommended hold off intervention given stable hgb.   Other differentials include diverticular bleeding, AVM bleeding.  Low suspicion for upper GI variceal bleeding given stable vital sign.   - Discontinue PPI and octreotide gtt   - ADAT  - Continue to trend Hgb daily   - IV vitamin K 10 mg once for elevated INR   - If has ongoing BRBPR with Hgb drop, will consider repeat endoscopic procedure to evaluate, otherwise proceed with already scheduled EGD in OR in October.   - Consider colorectal surgery consult for treatment of internal hemorrhoids       # Decompensated EtOH liver cirrhosis:  Chronic, no acute decompensation.   MELD-Na score: 12 at 9/4/2018 12:03 AM  MELD score: 12 at 9/4/2018 12:03 AM  Calculated from:  Serum Creatinine: 0.93 mg/dL (Rounded to 1) at 9/4/2018 12:03 AM  Serum Sodium: 138 mmol/L (Rounded to 137) at 9/4/2018 12:03 AM  Total Bilirubin: 1.2 mg/dL at 9/4/2018 12:03 AM  INR(ratio): 1.48 at 9/4/2018 12:03 AM  Age: 47 years    - HE: continue Rifaximin 550 mg BID and Lactulose   - h/o EV: last EGD 7/2018. Repeat EGD already scheduled 10/2018 for repeat banding   - HCC screening: MRI 5/2018 showed 2x LI-RADs-3 lesions, repeat MRI liver imaging 6-12 months   - Non-occlusive PVT extending to left portal vein, chronic, not on AC  - Ascites: continue lasix 20 mg daily and spironolactone 25 mg daily. Paracentesis PRN    - Cipro 500 mg daily for 5 days for prophylaxis given GIB.   - 2000 mg sodium/high protein diet   - Follow up in liver clinic as outpatient     Pt is seen and discussed with Dr. Ramirez.     Joy Krishnamurthy MD  Hepatology  #[phone]  ATTENDING NOTE, GASTROENTEROLOGY/HEPATOLOGY    I saw and discussed this patient with the fellow and participated in the decision making. I agree with the fellow's note. Jordana Ramirez MD

## 2018-09-04 NOTE — PROGRESS NOTES
[facility]  Patient is currently open to home care services with [facility].  The patient is currently receiving RN services.  [facility]  and team have been notified of patient admission.  [facility] liaison will continue to follow patient during stay.  If appropriate provide orders to resume home care at time of discharge.    Thank you  Sindi Gagnon RN, BSN  [facility] Liaison  [facility]  [phone]

## 2018-09-04 NOTE — ED TRIAGE NOTES
Patient arrives via EMS from home for complaints of rectal bleeding, wife Yisel states that it is bright neha colored blood and states that he bleed through the brief that the patient has. Patient has an appointment with a neurologist tomorrow 9/4 and a paracentesis scheduled for 9/5, Yisel states that he has a paracentesis every other week where they take off approx, 8-9 L.

## 2018-09-04 NOTE — ED NOTES
[facility] [place]   ED Nurse to Floor Handoff     Mono Varghese is a 47 year old male who speaks English and lives with family members,  in a home  They arrived in the ED by ambulance from home    ED Chief Complaint: Rectal Bleeding    ED Dx;   Final diagnoses:   Gastrointestinal hemorrhage, unspecified gastrointestinal hemorrhage type   Varices, gastric   Cirrhosis of liver with ascites, unspecified hepatic cirrhosis type (H)         Needed?: No    Allergies:   Allergies   Allergen Reactions     Latex Itching and Rash     No Clinical Screening - See Comments      Coban and Surgilast cause itching     Tegaderm Transparent Dressing (Informational Only)    .  Past Medical Hx:   Past Medical History:   Diagnosis Date     ADHD      Alcoholic cirrhosis of liver with ascites (H) 06/17/2015    cirrhosis secondary to alcohol, complicated by variceal bleeding and hepatic encephalopathy      Ascites due to alcoholic cirrhosis (H)     Requiring regular paracentesis.     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 status post subtotal resection by Dr. J Carlos Aranda in early 06/2013. Negative for 1p/19q deletions. S/P Concurrent chemoradiation July 15 through August 23, 2013. Initiated adjuvant  oral temozolomide 9/17/13; switch     H/O LENA (obstructive sleep apnea)-moderate 12/18/2012    Pt states this is not currently an issue.     Hyperlipidemia LDL goal <100 8/1/2016     Hypothyroidism 8/1/2016     Liver failure (H)      Moderate major depression (H) 10/3/2012     Obesity      Pancytopenia (H) 8/1/2016    Chronic pancytopenia secondary to liver disease since at least 2012      Partial epilepsy with impairment of consciousness (H) 05/07/2014     Portal hypertensive gastropathy      Portal vein thrombosis 12/18/2015    history of left lower extremity deep vein thrombosis as well as portal vein and superior mesenteric vein thrombosis, late 2015 when he was hospitalized in the ICU and seriously ill temporary IVC filter placed in 12/2015 when he was in the ICU with deep vein thromboses and active bleeding      Pulmonary nodules 6/17/2015     Rectal bleeding      Type 2 diabetes mellitus (H) 10/3/2012      Baseline Mental status: cognitively impaired  Current Mental Status changes: at basesline    Infection present or suspected this encounter: no  Sepsis suspected: No  Isolation type: No active isolations     Activity level - Baseline/Home:  Independent  Activity Level - Current:   Stand with Assist    Bariatric equipment needed?: No    In the ED these meds were given:   Medications   octreotide (sandoSTATIN) 1,250 mcg in sodium chloride 0.9 % 250 mL (50 mcg/hr Intravenous New Bag 9/4/18 0127)   cefTRIAXone (ROCEPHIN) 1 g vial to attach to  mL bag for ADULTS or NS 50 mL bag for PEDS (1 g Intravenous New Bag 9/4/18 0127)   pantoprazole (PROTONIX) 40 mg IV push injection (40 mg Intravenous Given 9/4/18 0049)   octreotide (sandoSTATIN) injection 50 mcg (50 mcg Intravenous Given 9/4/18 0123)   tranexamic acid (CYKLOKAPRON) 1 g in sodium chloride 0.9 % 50 mL bolus (1 g Intravenous Given 9/4/18 0109)       Drips running?  Yes    Home pump  No    Current LDAs  Peripheral IV 09/03/18 Left Upper forearm (Active)  "  Site Assessment WDL 9/3/2018 11:37 PM   Line Status Saline locked 9/3/2018 11:37 PM   Phlebitis Scale 0-->no symptoms 9/3/2018 11:37 PM   Infiltration Scale 0 9/3/2018 11:37 PM   Extravasation? No 9/3/2018 11:37 PM   Number of days:1       Port A Cath Single 07/11/18 Right Chest wall (Active)   Number of days:55       Gastrostomy/Enterostomy Percutaneous endoscopic gastrostomy (PEG) LUQ 1 20 fr (Active)   Number of days:981       Wound 12/08/15 Inner Buttocks  (Active)   Number of days:1001       Wound 12/26/15 Left Groin Skin tear tape burn  (Active)   Number of days:983       Wound 02/11/16 Left Arm Abrasion(s) (Active)   Number of days:936       Labs results:   Labs Ordered and Resulted from Time of ED Arrival Up to the Time of Departure from the ED   CBC WITH PLATELETS DIFFERENTIAL - Abnormal; Notable for the following:        Result Value    WBC 2.1 (*)     RBC Count 3.44 (*)     Hemoglobin 11.3 (*)     Hematocrit 33.6 (*)     Platelet Count 40 (*)     Absolute Neutrophil 1.3 (*)     Absolute Lymphocytes 0.5 (*)     All other components within normal limits   INR - Abnormal; Notable for the following:     INR 1.48 (*)     All other components within normal limits   COMPREHENSIVE METABOLIC PANEL - Abnormal; Notable for the following:     Glucose 159 (*)     Calcium 8.1 (*)     Albumin 2.7 (*)     Protein Total 6.2 (*)     Alkaline Phosphatase 322 (*)     AST 52 (*)     All other components within normal limits   AMMONIA - Abnormal; Notable for the following:     Ammonia 131 (*)     All other components within normal limits   PARTIAL THROMBOPLASTIN TIME   PERIPHERAL IV CATHETER   ABO/RH TYPE AND SCREEN       Imaging Studies: No results found for this or any previous visit (from the past 24 hour(s)).    Recent vital signs:   BP (!) 132/97  Temp 98.5  F (36.9  C) (Oral)  Resp 15  Ht 1.778 m (5' 10\")  Wt 78.5 kg (173 lb)  SpO2 97%  BMI 24.82 kg/m2    Cardiac Rhythm: Normal Sinus  Pt needs tele? " No  Skin/wound Issues: None    Code Status: DNR / DNI    Pain control: pt had none    Nausea control: pt had none    Abnormal labs/tests/findings requiring intervention: Elevated ammonia     Family present during ED course? Yes   Family Comments/Social Situation comments: Wife, Yisel, is at bedside    Tasks needing completion: None    Charli James, RN  [phone] [facility]

## 2018-09-04 NOTE — PROGRESS NOTES
Pt admitted to 6B to room 33 bed 2. Patient stable with no current bleeding. Two person skin assessment done by myself and Abdiaziz JONES. No skin issues noted.

## 2018-09-04 NOTE — PROCEDURES
Procedure Note  The risks and benefits of the procedure were explained to the patient, who expressed understanding and opted to proceed.  Consent was obtained and placed in the chart.  A time out was performed.  An area of ascites was located using ultrasound and marked in the right lower quadrant; the area was prepped and draped in the usual sterile fashion.  5 ml of 1% lidocaine was instilled and ascites located.  The paracentesis catheter and needle were inserted until ascites obtained then the needle removed.  The apparatus was connected to vacuum bottles and a total of ~3000 ml of clear, yellow ascitic fluid was removed.   The catheter was withdrawn and the area dressed.  Patient tolerated the procedure well.  The procedure was performed by Jesse Chowdhury MD and I was present throughout.  Location: RLQ  Total volume removed:  3L clear yellow fluid  Fluid sent to the lab: Giovanni Matos MD  Attending Physician  [facility]  Bedside Procedures Service  [phone]  DOS:  September 4, 2018

## 2018-09-05 NOTE — PROGRESS NOTES
"[facility]    Hepatology Follow-up    CC: BRBPR    Dx: Decompensated EtOH liver cirrhosis              BRBPR concerning for rectal variceal bleeding.        24 hour events: Hgb, WBC and Plt continue down trend     Subjective:    Continue to have small amount of rectal bleeding. He denies abdominal pain.  Per wife, pt seems more confused than usual.       Medications  Current Facility-Administered Medications   Medication Dose Route Frequency     calcium carbonate 500 mg-vitamin D 200 units  1 tablet Oral Daily     cholecalciferol  1,000 Units Oral Daily     ciprofloxacin  500 mg Oral BID     cyanocobalamin  1,000 mcg Per G Tube Daily     furosemide  20 mg Oral Daily     gabapentin  100 mg Oral At Bedtime     hydrOXYzine  25 mg Oral BID     lactulose  20 g Oral TID     levothyroxine  88 mcg Oral Daily     methylphenidate  10 mg Oral BID     mirtazapine  45 mg Oral At Bedtime     omeprazole  40 mg Oral BID     phenytoin  120 mg Oral BID     pravastatin  80 mg Oral Daily     rifaximin  550 mg Oral BID     sodium chloride (PF)  3 mL Intracatheter Q8H     spironolactone  50 mg Oral Daily     thiamine  100 mg Oral Daily       Review of systems  A 10-point review of systems was negative.    Examination  /84  Pulse 66  Temp 98  F (36.7  C) (Oral)  Resp 16  Ht 1.778 m (5' 10\")  Wt 78.5 kg (173 lb)  SpO2 94%  BMI 24.82 kg/m2    Intake/Output Summary (Last 24 hours) at 09/05/18 1729  Last data filed at 09/05/18 0548   Gross per 24 hour   Intake              120 ml   Output                0 ml   Net              120 ml       Gen- well, NAD, A+Ox3, normal color  CVS- RRR  RS- CTA  Abd- distended, non tender, normal active BS   Extr- 1+ pitting edema   Neuro- no asterixis   Skin- no rash  Psych- normal mood  Lym- no LAD    Laboratory  Lab Results   Component Value Date     09/05/2018    POTASSIUM 4.0 09/05/2018    CHLORIDE 109 09/05/2018    CO2 26 09/05/2018    BUN 16 09/05/2018    " CR 0.91 09/05/2018       Lab Results   Component Value Date    BILITOTAL 1.6 09/05/2018    ALT 36 09/05/2018    AST 61 09/05/2018    ALKPHOS 262 09/05/2018       Lab Results   Component Value Date    WBC 1.5 09/05/2018    HGB 10.7 09/05/2018    MCV 97 09/05/2018    PLT 36 09/05/2018       Lab Results   Component Value Date    INR 1.51 09/05/2018       Assessment  47 year old M with h/o decompensated EtOH liver cirrhosis c/b EV bleeding s/p banding x2 7/2018, HE and ascites (require paracentesis q2 weeks), h/o rectal bleeding, internal hemorrhoids, 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 Continue having small amount of BRBPR. 2 g Hgb drop from 12.9 -> 10.7. Has pancytopenia with WBC down to 1.5 and Plt 36. INR 1.5. HD stable.       Recommendations    # Painless BRBPR: 2 g Hgb drop from 12.9 -> 10.7. Mostly likely hemorrhoidal vs. Rectal variceal bleeding from PHT. Flex sig 1/2018 showed internal hemorrhoids and prominent rectal vein. Consulted colorectal surgery, recommended TIPS.  However, patient has a history of hepatic encephalopathy which could be worsen after TIPS.    - will discuss with Dr. Tanner tomorrow regarding candidacy of TIPS procedure (MELD-NA 12).   - Continue to trend Hgb daily   - Discussed with Advanced endoscopy staff regarding EUS-guided coiling and glue of rectal varices, may be a candidate.        # Pancytopenia: all 3 cell lines trending down. Differentials include acute worsening of portal hypertension (US 2017 showed non occlusive portal vein), malnutrition, malignancy (low suspicion)   - obtain RUQ US with doppler   - Check Vitamin B12, folate, Zinc, copper, Iron panel and ferritin        # Decompensated EtOH liver cirrhosis:  Chronic, no acute decompensation.   MELD-Na score: 13 at 9/5/2018  5:51 AM  MELD score: 13 at 9/5/2018  5:51 AM  Calculated from:  Serum Creatinine: 0.91  mg/dL (Rounded to 1) at 9/5/2018  5:51 AM  Serum Sodium: 141 mmol/L (Rounded to 137) at 9/5/2018  5:51 AM  Total Bilirubin: 1.6 mg/dL at 9/5/2018  5:51 AM  INR(ratio): 1.51 at 9/5/2018  5:51 AM  Age: 47 years    - HE: continue Rifaximin 550 mg BID and Lactulose (titrate to 3 BM daily; hold if >4 BM to avoid dehydration)   - h/o EV: last EGD 7/2018. Repeat EGD already scheduled 10/2018 for repeat banding   - HCC screening: MRI 5/2018 showed 2x LI-RADs-3 lesions, repeat MRI liver imaging 6-12 months   - Non-occlusive PVT extending to left portal vein, chronic, not on AC  - Ascites: continue lasix 20 mg daily and spironolactone 25 mg daily. Paracentesis 9/4 negative for SBP  - Cipro 500 mg daily for 5 days for prophylaxis given GIB.   - 2000 mg sodium/high protein diet   - Follow up in liver clinic as outpatient     Patient is discussed with Dr. Ramirez.     Hepatology service will continue to follow, please page if any questions.     Joy Krishnamurthy MD  Hepatology  # [phone]

## 2018-09-05 NOTE — PROVIDER NOTIFICATION
Refusing Lactulose.  Alert, oriented X 4, [place] = 0.  Has had 4 BM's in last couple hours.  Last was watery brown. Also complains of itching over right scapula, redness.  Mary Beyer MD notified, said to continue to monitor per [place] and diphenhydramine cream ordered.  Will continue to monitor.

## 2018-09-05 NOTE — PLAN OF CARE
Problem: Patient Care Overview  Goal: Plan of Care/Patient Progress Review  PT / 6B -     Discharge Planner PT   Patient plan for discharge: home with 24/7 assistance/supervision  Current status: PT evaluation completed.  Performing bed mobility and sit>stand transfer without additional assistance.  Ambulated ~ 100' x 2 with FWW + CGA/SBA. Climbed 6 stairs with B UE support.  Barriers to return to prior living situation: impaired cognition, higher level balance, LE strength  Recommendations for discharge: home with 24/7 supervision/assistance + independent home exercise program  Rationale for recommendations: Mono's wife and mother-in-law are available to support/assist patient with cueing and safety.  Provide Mono with a home exercise program to improve LE strength and CV endurance to improve activity tolerance with goals to return to work.        Entered by: Anisa Denton 09/05/2018 4:35 PM

## 2018-09-05 NOTE — PLAN OF CARE
Problem: Patient Care Overview  Goal: Plan of Care/Patient Progress Review  Outcome: Improving  Neuro: Oriented - some confusion.   Cardiac: VSS.   Respiratory: Sating 99% on RA.  GI/: Adequate urine output. BM X2  Diet/appetite: Tolerating diet. Eating well.  Activity:  Assist of 1 and walker, up to chair and in halls.  Pain: At acceptable level on current regimen.   Skin: No new deficits noted.  LDA's: Port - hep locked    Plan: Continue with POC. Notify primary team with changes.

## 2018-09-05 NOTE — PROGRESS NOTES
09/05/18 0951   Quick Adds   Type of Visit Initial Occupational Therapy Evaluation   Living Environment   Lives With spouse   Living Arrangements house   Home Accessibility bed and bath are not on the first floor;stairs within home;tub/shower is not walk in;stairs to enter home   Number of Stairs to Enter Home 2   Number of Stairs Within Home 24   Transportation Available car;family or friend will provide   Living Environment Comment Pt reports living in 3 level town home with wife. bedroom on third floor. Pt reports living with MIL for past several months as fewer stairs (bedroom on second floor) as wife post op brain surgery. MIL and wife are pt's PCAs   Self-Care   Dominant Hand right   Usual Activity Tolerance moderate   Current Activity Tolerance fair   Regular Exercise no   Equipment Currently Used at Home none   Activity/Exercise/Self-Care Comment Pt stating having A for ADLs   Functional Level Prior   Ambulation 0-->independent   Transferring 0-->independent   Toileting 2-->assistive person   Bathing 2-->assistive person   Dressing 0-->independent   Eating 0-->independent   Communication 0-->understands/communicates without difficulty   Swallowing 0-->swallows foods/liquids without difficulty   Cognition 2 - difficulty with organizing thoughts   Fall history within last six months no   Which of the above functional risks had a recent onset or change? ambulation;transferring;toileting;dressing;bathing;eating;swallowing;cognition;fall history   General Information   Onset of Illness/Injury or Date of Surgery - Date 09/03/18   Referring Physician Maribell Bolden MD   Patient/Family Goals Statement Pt would like to return home   Additional Occupational Profile Info/Pertinent History of Current Problem This is a 48 yo M with severe alcoholic liver disease, known portal hypertension and sequelae (esophageal varices) who is not a transplant candidate due to unresectable oligoastrocytoma. He presents with  bright red blood per rectum.    Precautions/Limitations fall precautions   General Observations Pt supine in bed   General Info Comments Activity: Up with assist   Cognitive Status Examination   Orientation person;place   Level of Consciousness alert   Able to Follow Commands moderate impairment   Personal Safety (Cognitive) moderate impairment   Memory impaired   Attention Distractible during evaluation;Quiet environment required;Sustained attention impaired;Selective attention impaired, difficulty ignoring irrelevant stimuli;Alternating attention impaired, difficulty shifting between tasks   Cognitive Comment Per wife pt with baseline cognitive deficits due to brain tumor. Wife stating pt has had neuropsych testing completed testing under 10 y/o. Pt is supossed to have 24 hr supervision   Visual Perception   Visual Perception No deficits were identified;Wears glasses   Sensory Examination   Sensory Quick Adds No deficits were identified   Pain Assessment   Patient Currently in Pain No   Integumentary/Edema   Integumentary/Edema no deficits were identifed   Posture   Posture not impaired   Range of Motion (ROM)   ROM Quick Adds No deficits were identified   Strength   Strength Comments pt with generalized weakness   Hand Strength   Hand Strength Comments WFL   Muscle Tone Assessment   Muscle Tone Quick Adds No deficits were identified   Coordination   Upper Extremity Coordination No deficits were identified   Transfer Skill: Bed to Chair/Chair to Bed   Level of [place]: Bed to Chair contact guard   Physical Assist/Nonphysical Assist: Bed to Chair 1 person assist   Transfer Skill: Sit to Stand   Level of [place]: Sit/Stand contact guard   Physical Assist/Nonphysical Assist: Sit/Stand 1 person assist   Transfer Skill: Toilet Transfer   Level of [place]: Toilet contact guard   Physical Assist/Nonphysical Assist: Toilet 1 person assist   Lower Body Dressing   Level of [place]: Dress Lower Body  "minimum assist (75% patients effort)   Physical Assist/Nonphysical Assist: Dress Lower Body 1 person assist   Instrumental Activities of Daily Living (IADL)   IADL Comments Pt has 24 hour supervision and assist   Activities of Daily Living Analysis   Impairments Contributing to Impaired Activities of Daily Living balance impaired;cognition impaired;fear and anxiety;strength decreased   General Therapy Interventions   Planned Therapy Interventions ADL retraining;bed mobility training;risk factor education;progressive activity/exercise;home program guidelines;transfer training;strengthening   Clinical Impression   Criteria for Skilled Therapeutic Interventions Met yes, treatment indicated   OT Diagnosis decreased ADL I   Influenced by the following impairments medical status, strength    Assessment of Occupational Performance 1-3 Performance Deficits   Identified Performance Deficits shower, bathing, transfering   Clinical Decision Making (Complexity) Low complexity   Therapy Frequency other (see comments)  (4x/wk)   Predicted Duration of Therapy Intervention (days/wks) 2 weeks   Anticipated Equipment Needs at Discharge (TBD)   Anticipated Discharge Disposition Home with Assist  (24 hour assist)   Risks and Benefits of Treatment have been explained. Yes   Patient, Family & other staff in agreement with plan of care Yes   [facility] Hospital[facility] TM \"6 Clicks\"   2016, Trustees of [facility], under license to [employer].  All rights reserved.   6 Clicks Short Forms Daily Activity Inpatient Short Form   [facility]  \"6 Clicks\" Daily Activity Inpatient Short Form   1. Putting on and taking off regular lower body clothing? 3 - A Little   2. Bathing (including washing, rinsing, drying)? 3 - A Little   3. Toileting, which includes using toilet, bedpan or urinal? 3 - A Little   4. Putting on and taking off regular upper body clothing? 3 - A Little   5. Taking care of personal grooming such as brushing " teeth? 3 - A Little   6. Eating meals? 3 - A Little   Daily Activity Raw Score (Score out of 24.Lower scores equate to lower levels of function) 18   Total Evaluation Time   Total Evaluation Time (Minutes) 5

## 2018-09-05 NOTE — PLAN OF CARE
Problem: Patient Care Overview  Goal: Plan of Care/Patient Progress Review  Outcome: Improving  Neuro: A&Ox4. [place] score = 0.  Declined lactulose on evening shift after having 4 BM's.  Monitored with [place] scoring without need for PRN lactulose.    Vitals:  Temp: 97.3  F (36.3  C) Temp src: Oral BP: 97/70Heart Rate: 59 Resp: 16 SpO2: 97 % O2 Device: None (Room air)    Respiratory: Sating adequately on RA.  GI/: Up to bathroom. Wearing depends. No sign of GI bleeding.   Diet/appetite: Tolerating diet. Eating well.  Activity:  Assist of 1 up to bathroom  Pain: Denies   Skin: Reddened area, right scapula, blanchable erythema. Complained of itching and irritation.  Declined diphenhydramine cream  Mepiplex dressing applied.   Plan: Continue with POC. Notify primary team with changes.

## 2018-09-05 NOTE — PLAN OF CARE
Problem: Patient Care Overview  Goal: Plan of Care/Patient Progress Review  OT/6B:  Discharge Planner OT   Patient plan for discharge: home  Current status: Pt CGA for EOB to chair transfer. Pt requiring redirection to task. Pt with baseline cognitive deficits, not aware of impacts on ADLs  Barriers to return to prior living situation: strength, endurance, ADLs  Recommendations for discharge: home with 24 hour supervision  Rationale for recommendations: Per family based on pt's neuro psych testing requires 24 hour supervision at baseline. Wife and MIL are pt's PCAs. Per wife have been staying at [facility] last few months due to her medical needs, requiring more assist. Plan is to discharge back to [facility] for a few more weeks.        Entered by: Lia Chaney 09/05/2018 10:54 AM

## 2018-09-05 NOTE — PROGRESS NOTES
[facility], [place]    Internal Medicine Progress Note - Maroon Service    Main Plans for Today   - Ordered RUQ US to eval chronic PVTs to determine if coiling of rectal varices would be an option   - PT/OT     Assessment & Plan   Mono Varghese is a 47 year old male with with history of DM2, thrombocytopenia, cirrhosis, portal vein thrombosis who presented with 3 days of multiple episodes of bright red blood per rectum.      #Lower GI Bleed  #Acute on chronic anemia   Per GI, low c/f upper GI source, most likely hemorrhoids and rectal varices; no obvious signs of bleeding since admission but Hgb 10.7 from 11.5 yesterday; per GI, ordered RUQ US to eval chronic PVTs to determine if coiling of rectal varices would be an option; if PVTs are worse, patient would be more likely to redevelop varices and bleed again; CRC saw the patient on 9/4 and recommended TIPS given portal HTN, would not intervene on unless active hemorrhage given his co-morbidities  - RUQ US in AM, NPO at midnight   - Nothing per rectum   - Cipro 500mg every day for 5 days total to prevent SBP (diagnostic paracentesis w/ 89 WBCs on 9/4)     #HE   Patient presented w/ hx of not finishing sentences, acting strangely; ammonia 130 and asterixis on exam; improved today; patient's mother-in-law noting that she does not think that he always has 3-4 BMs/day   - Cont lactulose and rifaximin     #EtOH cirrhosis c/b rectal and esophageal varices, refractory ascites, portal vein thromboses, and HE     Most recent EGD with grade II EVs s/p 2 bands on 7/11; s/p 3L removed from para on 9/4 (did not want to remove more given presentation w/ GIB; requires 8-9L ascitic fluid removed every other week  - Due for repeat endoscopy this month   - Lactulose as above  - Cont Lasix 20mg every day and spironolactone 50mg every day  - Follows w/ GI as outpatient for HCC screening   - Cont omeprazole 40mg BID    MELD-Na score: 13 at 9/5/2018  5:51  AM  MELD score: 13 at 9/5/2018  5:51 AM  Calculated from:  Serum Creatinine: 0.91 mg/dL (Rounded to 1) at 9/5/2018  5:51 AM  Serum Sodium: 141 mmol/L (Rounded to 137) at 9/5/2018  5:51 AM  Total Bilirubin: 1.6 mg/dL at 9/5/2018  5:51 AM  INR(ratio): 1.51 at 9/5/2018  5:51 AM  Age: 47 years    #Pancytopenia   - Sending iron panel, B12, folate, zinc, and copper (may be 2/2 liver disease vs malnutrition)     #L parietal astrocytoma s/p partial resection, chemo, 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    - Continue phenytoin      #C/f poor nutrition  - Calorie counts here   - Nutrition consulted     Chronic:  # Endo: hypothyroid on synthroid  # Depression/insomnia: continue home mirtazapine 45mg QPM, gabapentin 100mg QPM, Ritalin 10mg BID, Atarax 25mg BID     Diet: 2 Gram Sodium Diet  Calorie Counts  NPO for Medical/Clinical Reasons Except for: Ice Chips, Meds  Fluids: PO  DVT Prophylaxis: PCDs, plts 38  Code Status: DNR/DNI     Disposition Plan   Expected discharge: 2 - 3 days, pending PT/OT eval; after plan from GI for rectal varices is set      Entered: Maribell Bolden 09/05/2018, 4:50 PM   Information in the above section will display in the discharge planner report.      The patient's care was discussed with the Attending Physician, Dr. Howell.    Maribell Bolden  [facility]: 4  Pager: [phone]  Please see sticky note for cross cover information    Interval History   NAEO; 4BMs yesterday but no signs of blood or melena; patient denied abd pain, feels more alert than yesterday; has been refusing lactulose intermittently     Physical Exam   Vital Signs: Temp: 98  F (36.7  C) Temp src: Oral BP: 107/84 Pulse: 66 Heart Rate: 67 Resp: 16 SpO2: 94 % O2 Device: None (Room air)    Weight: 173 lbs 0 oz  General Appearance: NAD, on RA   Respiratory: CTA, no wheezes or crackles  Cardiovascular: RRR, no m/r/g  GI: soft, distended, non tender, bowel sounds heard  Neuro: alert and oriented, no asterixis      Data   Medications       calcium carbonate 500 mg-vitamin D 200 units  1 tablet Oral Daily     cholecalciferol  1,000 Units Oral Daily     ciprofloxacin  500 mg Oral BID     cyanocobalamin  1,000 mcg Per G Tube Daily     furosemide  20 mg Oral Daily     gabapentin  100 mg Oral At Bedtime     hydrOXYzine  25 mg Oral BID     lactulose  20 g Oral TID     levothyroxine  88 mcg Oral Daily     methylphenidate  10 mg Oral BID     mirtazapine  45 mg Oral At Bedtime     omeprazole  40 mg Oral BID     phenytoin  120 mg Oral BID     pravastatin  80 mg Oral Daily     rifaximin  550 mg Oral BID     sodium chloride (PF)  3 mL Intracatheter Q8H     spironolactone  50 mg Oral Daily     thiamine  100 mg Oral Daily     Data     Recent Labs  Lab 09/05/18  0551 09/04/18  1220 09/04/18  0558 09/04/18  0003   WBC 1.5*  --  2.0* 2.1*   HGB 10.7* 11.5* 11.2* 11.3*   MCV 97  --  98 98   PLT 36*  --  41* 40*   INR 1.51*  --   --  1.48*     --   --  138   POTASSIUM 4.0  --   --  3.7   CHLORIDE 109  --   --  105   CO2 26  --   --  24   BUN 16  --   --  13   CR 0.91  --   --  0.93   ANIONGAP 6  --   --  8   UCHE 7.6*  --   --  8.1*   GLC 85  --   --  159*   ALBUMIN 2.5*  --   --  2.7*   PROTTOTAL 5.6*  --   --  6.2*   BILITOTAL 1.6*  --   --  1.2   ALKPHOS 262*  --   --  322*   ALT 36  --   --  36   AST 61*  --   --  52*     No results found for this or any previous visit (from the past 24 hour(s)).

## 2018-09-06 NOTE — PROGRESS NOTES
Care Coordinator Progress Note    Admission Date/Time:  9/3/2018  Attending MD:  Gianni Howell MD    Data  Chart reviewed, discussed with interdisciplinary team.   Patient was admitted for:    Gastrointestinal hemorrhage, unspecified gastrointestinal hemorrhage type  Varices, gastric  Cirrhosis of liver with ascites, unspecified hepatic cirrhosis type (H).    Concerns with insurance coverage for discharge needs:None identified  Current Living Situation: Patient lives with Wife and Mother in Law  Support System: Currently Pts Mother in LawFaby is Primary Caregiver while Pts WifeYisel is recovering from recent illness  Services Involved: [facility] #[phone]: Nursing visits  Transportation at Discharge: Faby  Transportation to Medical Appointments: Faby  Barriers to Discharge: Medical Clearance    Coordination of Care and Referrals: [facility] updated       Assessment  Pt with history of esophageal varices, cirrhosis admitted with rectal bleeding. I have met with Pt and his Mother in LawFaby to introduce myself and care coordinator role. Faby informs me Pt has cognition deficits and he and his Wife Yisel are living with her. Prior to admission, Faby was assisting Pt with ADLS, meal prep, med reminders, transportation.  [facility] provides skilled nursing visits which includes medication set up. I have added Nursing and PT to the discharge orders.     Plan  Anticipated Discharge Date:  TBD  Anticipated Discharge Plan:  Discharge to home with resumption of Skilled nursing visits provided by [facility].    Sadie Campuzano RN   6B care coordinator #[phone]

## 2018-09-06 NOTE — PROGRESS NOTES
"[facility]    Hepatology Follow-up    CC:                BRBPR     Dx:                Decompensated EtOH liver cirrhosis                        BRBPR concerning for rectal variceal bleeding.          24 hour events:  Hgb improved      Subjective:     No complaints   Hgb stable. Brown stool was charted.       Medications  Current Facility-Administered Medications   Medication Dose Route Frequency     calcium carbonate 500 mg-vitamin D 200 units  1 tablet Oral Daily     cholecalciferol  1,000 Units Oral Daily     ciprofloxacin  500 mg Oral BID     cyanocobalamin  1,000 mcg Per G Tube Daily     furosemide  20 mg Oral Daily     gabapentin  100 mg Oral At Bedtime     hydrOXYzine  25 mg Oral BID     lactulose  20 g Oral BID     levothyroxine  88 mcg Oral Daily     methylphenidate  10 mg Oral BID     mirtazapine  45 mg Oral At Bedtime     omeprazole  40 mg Oral BID     phenytoin  120 mg Oral BID     pravastatin  80 mg Oral Daily     rifaximin  550 mg Oral BID     sodium chloride (PF)  3 mL Intracatheter Q8H     spironolactone  50 mg Oral Daily     thiamine  100 mg Oral Daily       Review of systems  A 10-point review of systems was negative.    Examination  /75 (BP Location: Left arm)  Pulse 59  Temp 97.7  F (36.5  C) (Oral)  Resp 18  Ht 1.778 m (5' 10\")  Wt 80.5 kg (177 lb 7.5 oz)  SpO2 97%  BMI 25.46 kg/m2    Intake/Output Summary (Last 24 hours) at 09/06/18 1250  Last data filed at 09/06/18 0434   Gross per 24 hour   Intake              140 ml   Output                0 ml   Net              140 ml       Gen- well, NAD, A+Ox3, normal color  CVS- RRR  RS- CTA  Abd- distended, non tender, normal active BS   Extr- 1+ pitting edema   Neuro- no asterixis   Skin- no rash  Psych- normal mood  Lym- no LAD      Laboratory  Lab Results   Component Value Date     09/06/2018    POTASSIUM 3.7 09/06/2018    CHLORIDE 109 09/06/2018    CO2 23 09/06/2018    BUN 16 09/06/2018    CR 0.82 " 09/06/2018     Lab Results   Component Value Date    BILITOTAL 1.6 09/05/2018    ALT 36 09/05/2018    AST 61 09/05/2018    ALKPHOS 262 09/05/2018     Lab Results   Component Value Date    WBC 1.8 09/06/2018    HGB 11.1 09/06/2018    MCV 99 09/06/2018    PLT 42 09/06/2018     Lab Results   Component Value Date    INR 1.51 09/05/2018         Assessment  47 year old M with h/o decompensated EtOH liver cirrhosis c/b EV bleeding s/p banding x2 7/2018, HE and ascites (require paracentesis q2 weeks), h/o rectal bleeding, internal hemorrhoids, 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  2 g Hgb drop from 12.9 -> 10.7, then improved to 11.1. Witnessed brown stool.  Has pancytopenia with WBC down to 1.5 and Plt 36. INR 1.5. HD stable.         Recommendations     # Painless BRBPR: Mostly likely hemorrhoidal vs. Rectal variceal bleeding from PHT. Flex sig 1/2018 showed internal hemorrhoids and prominent rectal vein. Consulted colorectal surgery, recommended TIPS.  However, patient has a history of hepatic encephalopathy which could be worsen after TIPS.    - Discussed with Advanced endoscopy staff (Dr. Tejada), patient is a candidate for EUS-guided coiling and glue of rectal varices. Will arrange for outpatient EUS-guided coiling and glue         # Pancytopenia:  improving. Likely from acute worsening of portal hypertension (US 2017 showed non occlusive portal vein).    - RUQ US with doppler showed progression of PVT to right hepatic vein, which is non occlusive -> no acute intervention at this time.   - Zinc and copper pending   - Vitamin B 12 2524 and folate 31.9. Iron level wnl    - Zinc and copper pending           # Decompensated EtOH liver cirrhosis:  Chronic, no acute decompensation.   MELD-Na score: 13 at 9/6/2018  4:40 AM  MELD score: 13 at 9/6/2018  4:40 AM  Calculated from:  Serum Creatinine: 0.82 mg/dL (Rounded  to 1) at 9/6/2018  4:40 AM  Serum Sodium: 139 mmol/L (Rounded to 137) at 9/6/2018  4:40 AM  Total Bilirubin: 1.6 mg/dL at 9/5/2018  5:51 AM  INR(ratio): 1.51 at 9/5/2018  5:51 AM  Age: 47 years     - HE: continue Rifaximin 550 mg BID and Lactulose (titrate to 3 BM daily; hold if >4 BM to avoid dehydration)   - h/o EV: last EGD 7/2018. Repeat EGD already scheduled 10/2018 for repeat banding   - HCC screening: MRI 5/2018 showed 2x LI-RADs-3 lesions, repeat MRI liver imaging 6-12 months   - Progression of Non-occlusive PVT extending to left hepatic vein and right hepatic vein, chronic -> no acute intervention   - Ascites: continue lasix 20 mg daily and spironolactone 25 mg daily. Paracentesis 9/4 negative for SBP  - Cipro 500 mg daily for 5 days for prophylaxis given GIB.   - 2000 mg sodium/high protein diet   - Follow up in liver clinic with Dr. Tanner as outpatient      Dispo: patient is stable to be discharged from Hepatology perspective.     Patient is discussed with Dr. Tanner      Hepatology service will continue to follow, please page if any questions.      Joy Krishnamurthy MD  Hepatology  # [phone]  Attestation:  This patient has been seen and evaluated by me, Beatriz Tanner.  Discussed with the house staff team or resident(s) and agree with the findings and plan in this note.

## 2018-09-06 NOTE — PLAN OF CARE
Problem: Patient Care Overview  Goal: Plan of Care/Patient Progress Review    6B / Discharge Planner PT   Patient plan for discharge: home with 24/7 assistance/supervision and OP PT/OT  Current status: Patient completes sit<>stand transfers with CGA/SBA, ambulated ~150' x2 with FWW + CGA/SBA, navigated 3 steps with 1 rail and HHA from therapist. VSS on RA.  Barriers to return to prior living situation: impaired cognition, decreased safety awareness, balance impairments, generalized weakness and deconditioning  Recommendations for discharge: home with 24/7 supervision/assistance from family and OP PT/OT, patient and family in agreement with discharge plan  Rationale for recommendations: Mono's wife and mother-in-law are available to support/assist patient, patient in agreement with plan for OP PT/OT to progress strength, balance, functional endurance and safety with mobility and ADLs, has not found home therapy helpful in the past and would like to complete OP therapy, mother-in-law able to provide rides to therapy.

## 2018-09-06 NOTE — DISCHARGE SUMMARY
"[facility], [place]    Internal Medicine Discharge Summary- [place] Service    Date of Admission:  9/3/2018  Date of Discharge:  9/6/2018  7:27 PM  Discharging Attending Provider: Dr. Howell  Discharge Team: P[facility] 4    Discharge Diagnoses   Rectal hemorrhoidal/variceal bleeding   Hepatic encephalopathy    Follow-ups Needed After Discharge   - F/u w/ GI as an outpatient for possible TIPs vs coiling of rectal varices   - Cont cipro for a total 5 day course for SBP ppx     Hospital Course   Mono Varghese is a 47 year old male with with history of DM2, thrombocytopenia, cirrhosis c/b refractory ascites, HE, and rectal/esophageal varices, portal vein thrombosis not on anticoagulation who presented with 3 days of multiple episodes of bright red blood per rectum.       #Lower GI Bleed  #Acute on chronic anemia   #Rectal varices and hemorrhoids   Patient presented after 3 days of BRBPR rectum, night of admission wife stated it was \"coming out like a faucet\"; Hgb 11.3 from 12.9 on 8/1; the patient frequently has BRBP so had seen colorectal surgery as an outpatient who did not recommend surgical intervention for his hemorrhoids or varices; here, he was HD stable w/ stable daily Hgbs and no signs of rectal bleeding; abx were initiated for SBP ppx (diagnostic and therapeutic para of 3L negative for SBP on 9/4); GI saw him and agreed that the source was likely not upper (octreotide and IV PPI infusion dc'd); cororectal surgery saw him and again determined that surgical intervention was not indicated given the patient's portal hypertension (would not intervene unless active hemorrhage given his co-morbidities); GI has been considering coiling of rectal varices and/or possible TIPS procedure, which the patient will f/u w/ as an outpatient; abd US w/ dopplers was performed which showed extension of his known portal vein thromboses but still non-occlusive; PT cleared the patient to return home w/ " 24hr assistance, which his wife and mother-in-law have been providing   - F/u w/ GI as an outpatient for possible TIPs vs coiling of rectal varices   - Cont cipro for a total 5 day course for SBP ppx   - Nothing per rectum per colorectal surgery (enemas, etc)    #Hepatic encephalopathy  The patient's wife had noticed that over the past week that he had been acting strangely (not being able to finish his sentences and acting aggressive towards their dog); his mother-in-law who also cares for him also doubts that he has 3-4 BMs/day; on admission, ammonia was 131 and there was asterixis on exam; an aggressive lactulose regimen was given and by the next day the patient had improved   - Increased lactulose to 20mg TID (titrate to 3-4 BMs/day)    Consultations This Hospital Stay   GI LUMINAL ADULT IP CONSULT  INTERNAL MEDICINE PROCEDURE TEAM ADULT IP CONSULT [facility] - PARACENTESIS  VASCULAR ACCESS CARE ADULT IP CONSULT  PHYSICAL THERAPY ADULT IP CONSULT  OCCUPATIONAL THERAPY ADULT IP CONSULT  INTERNAL MEDICINE PROCEDURE TEAM ADULT IP CONSULT [place] - PARACENTESIS  COLORECTAL SURGERY ADULT IP CONSULT     Code Status   DNR / DNI     The patient was discussed with Dr. Lynda Bolden  [facility]  Pager: [phone]  ______________________________________________________________________    Physical Exam   Vital Signs:                   Weight: 177 lbs 7.52 oz    General Appearance: NAD, on RA   Respiratory: CTA, no wheezes or crackles  Cardiovascular: RRR, no m/r/g  GI: soft, distended, non tender, bowel sounds heard  Neuro: alert and oriented, no asterixis    Significant Results and Procedures   Diagnostic/therapeutic para on 9/4: 3L removed, WBC 89    US Abddomen Limited w Abd/Pelvis Duplex 9/6: 1. Progression of nonocclusive portal vein thrombosis since October 20, 2017; previously seen thrombus in the main portal vein and left portal vein now appears to have extended into the right portal  vein.2. Cirrhosis without focal liver lesion. Patent paraumbilical vein collateral is again seen. a. LI-RADS US Category: US-1 Negative: No US evidence of HCC 3. Cholelithiasis with chronically thickened gallbladder wall with  dependent sludge. Shadowing gallstones are new since October 20, 2017. Lack of gallbladder wall hyperemia and lack of tenderness to compression make cholecystitis unlikely. Gallbladder wall thickening is most consistent with the edematous state with surrounding ascites and underlying liver disease. 4. Large amount of right-sided ascites.    Pending Results   N/a    Primary Care Physician   [place]    Discharge Disposition   Discharged to home  Condition at discharge: Stable    Discharge Orders     Home care nursing referral     Home Care PT Referral for Hospital Discharge     Home Care PT Referral for Hospital Discharge     Reason for your hospital stay   Rectal bleeding     Adult [facility] Follow-up and recommended labs and tests   Follow-up with GI within the next two weeks (GI to arrange)    Appointments on [place] and/or [facility] (with [facility] or [facility] provider or service). Call [phone] if you haven't heard regarding these appointments within 7 days of discharge.     Activity   Your activity upon discharge: activity as tolerated     Discharge Instructions   GI will arrange a follow-up appointment to consider coiling of the rectal varices within the next two weeks  Take lactulose 20g three times/day, increase or decrease as needed to achieve 3-4 bowel movements per day to prevent confusion  Finish ciprofloxacin course on 9/8 to prevent an abdominal infection     Diet   Follow this diet upon discharge: Orders Placed This Encounter     Calorie Counts     2 Gram Sodium Diet       Discharge Medications   Discharge Medication List as of 9/6/2018  6:40 PM      START taking these medications    Details   ciprofloxacin (CIPRO) 500 MG tablet Take 1 tablet (500 mg) by mouth 2  times daily for 2 days, Disp-4 tablet, R-0, E-Prescribe         CONTINUE these medications which have CHANGED    Details   lactulose (CHRONULAC) 10 GM/15ML solution Take 30 mLs (20 g) by mouth 3 times daily, Disp-946 mL, R-3, E-PrescribeTitrate lactulose to 3-4 bowel movements per day         CONTINUE these medications which have NOT CHANGED    Details   VITAMIN D, CHOLECALCIFEROL, PO Take 1,000 Units by mouth daily, Historical      blood glucose monitoring (ONETOUCH ULTRA) test strip Use to test blood sugars 4 times daily as needed or as directed., Disp-400 each, R-1, E-Prescribe      Calcium Carb-Cholecalciferol (CALCIUM 500 +D) 500-400 MG-UNIT TABS Take 500 mg by mouth daily, Disp-90 tablet, R-1, E-Prescribe      CANE, ANY MATERIAL One cane, Disp-1 each, R-0, Local Print      ciclopirox (LOPROX) 0.77 % cream Apply topically 2 times daily To feet and toenails.Disp-90 g, [id number]      cyanocobalamin (VITAMIN  B-12) 1000 MCG tablet 1 tablet (1,000 mcg) by Per G Tube route daily, Disp-130 tablet, R-2, E-Prescribe      dulaglutide (TRULICITY) 1.5 MG/0.5ML pen Inject 1.5 mg Subcutaneous every 7 days, Disp-6 mL, R-1, E-Prescribe6 MOS SUPPLY      ferrous sulfate (IRON) 325 (65 FE) MG tablet Take 1 tablet (325 mg) by mouth 2 times daily, Disp-200 tablet, R-3, E-Prescribe      furosemide (LASIX) 20 MG tablet Take 1 tablet (20 mg) by mouth daily, Disp-30 tablet, R-3, E-Prescribe      gabapentin (NEURONTIN) 100 MG capsule Take 1 capsule (100 mg) by mouth At Bedtime, Disp-30 capsule, R-3, E-Prescribe      HYDROcodone-acetaminophen (NORCO) 5-325 MG per tablet Take 2 tablets by mouth every 6 hours as needed for moderate to severe pain, Disp-60 tablet, R-0, Local Print      hydrOXYzine (ATARAX) 25 MG tablet Take 1 tablet (25 mg) by mouth 2 times daily, Disp-180 tablet, R-1, E-Prescribe      levothyroxine (SYNTHROID/LEVOTHROID) 88 MCG tablet Take 1 tablet (88 mcg) by mouth daily, Disp-90 tablet, R-1, E-Prescribe       lidocaine (LMX4) 4 % CREA cream Apply topically once as needed for mild painDisp-133 g, [id number]      !! methylphenidate (RITALIN) 10 MG tablet Take 1 tablet (10 mg) by mouth 2 times daily, Disp-60 tablet, R-0, Local Print      !! methylphenidate (RITALIN) 10 MG tablet Take 1 tablet (10 mg) by mouth 2 times daily, Disp-60 tablet, R-0, Local Print      mirtazapine (REMERON) 45 MG tablet Take 1 tablet (45 mg) by mouth At Bedtime, Disp-30 tablet, R-2, E-Prescribe      multivitamin, therapeutic with minerals (THERA-VIT-M) TABS Take 1 tablet by mouth 2 times daily, Historical      mupirocin (BACTROBAN) 2 % ointment Use in nose 2-3 times per weekDisp-22 g, [id number]      omeprazole (PRILOSEC) 20 MG CR capsule Take 2 capsules (40 mg) by mouth 2 times daily, Disp-360 capsule, R-3, E-Prescribe      ONE TOUCH LANCETS MISC by In Vitro route 4 times daily as needed, Disp-100 each, R-prn, FaxAs of January 1 pt's insurance will only cover onetouch or accu check.  Patient needs a  new glucometer (one touch), too      phenytoin (DILANTIN) 30 MG CR capsule Take 4 capsules (120 mg) by mouth 2 times daily, Disp-720 capsule, R-11, E-Prescribe      pravastatin (PRAVACHOL) 80 MG tablet Take 1 tablet (80 mg) by mouth daily, Disp-90 tablet, R-3, E-Prescribe      spironolactone (ALDACTONE) 25 MG tablet Take 2 tablets (50 mg) by mouth daily, Disp-180 tablet, R-1, E-Prescribe      thiamine (VITAMIN B-1) 100 MG tablet Take 1 tablet (100 mg) by mouth daily, Disp-100 tablet, R-1, E-Prescribe      traZODone (DESYREL) 50 MG tablet Take 1 tablet (50 mg) by mouth nightly as needed for sleep, Disp-30 tablet, R-2, E-Prescribe      XIFAXAN 550 MG TABS tablet TAKE ONE TABLET BY MOUTH TWICE DAILY, Disp-60 tablet, R-11, E-PrescribePlease consider 90 day supplies to promote better adherence       !! - Potential duplicate medications found. Please discuss with provider.        Allergies   Allergies   Allergen Reactions     Latex Itching and  Rash     No Clinical Screening - See Comments      Coban and Surgilast cause itching     Tegaderm Transparent Dressing (Informational Only)

## 2018-09-06 NOTE — PLAN OF CARE
Problem: Patient Care Overview  Goal: Plan of Care/Patient Progress Review  Outcome: Improving  Neuro: A&Ox4. [place] score = 0.  Declined lactulose on evening shift.  Monitored with [place] scoring without need for PRN lactulose.    Vitals:  Temp: 97.9  F (36.6  C) Temp src: Oral BP: 115/74 Pulse: 59  Resp: 16 SpO2: 99 % O2 Device: None (Room air)    Respiratory: Sating adequately on RA.  GI/: Up to bathroom. Wearing depends. No sign of GI bleeding.   Diet/appetite: Tolerating diet. NPO after midnight  Activity:  Assist of 1 up to bathroom  Pain: Denies   Skin: Reddened area, right scapula, blanchable erythema. Complained of itching and irritation.  Declined diphenhydramine cream  Mepiplex dressing applied. Fell off at 0630  Plan: Continue with POC. Notify primary team with changes.

## 2018-09-06 NOTE — TELEPHONE ENCOUNTER
The radiology department here at the  was called to discuss the below and see if there was a Tigist here that was trying to contact Jody. No one in our radiology department knows of this message.

## 2018-09-06 NOTE — PLAN OF CARE
Problem: Patient Care Overview  Goal: Plan of Care/Patient Progress Review  Discharge Planner OT   Patient plan for discharge: home with assist from family and OP OT/PT   Current status: Pt completed med management task this session. Pt was able to sort 5/5 medications correctly, but needed maximum verbal cues to effectively read directions on pill bottle and sort appropriately into pill boxes. Pt SBA for sit<>stand transfer, can be impulsive with mobility at times, required VCs to wait for assistance. Pt ambulated ~250 feet throughout 6B hallways with fww, 2 standing rest breaks taken.   Barriers to return to prior living situation: deconditioning, cognition deficits   Recommendations for discharge: Home with assist from spouse and family with OP OT/PT   Rationale for recommendations: Pt would benefit from additional therapy to increase safety and independence with functional mobility and ADLs/IADLs. Pts spouse and mother in law are PCAs and available to assist at home when necessary.        Entered by: Crys Fitzgerald 09/06/2018 4:47 PM

## 2018-09-07 NOTE — PLAN OF CARE
Problem: Patient Care Overview  Goal: Plan of Care/Patient Progress Review  Occupational Therapy Discharge Summary    Reason for therapy discharge:    Discharged to home with outpatient therapy.    Progress towards therapy goal(s). See goals on Care Plan in [facility] electronic health record for goal details.  Goals partially met.  Barriers to achieving goals:   discharge from facility.    Therapy recommendation(s):    Continued therapy is recommended.  Rationale/Recommendations:  Pt would benefit from additional therapy to increase safety and independence with ADLs/IADLs and functional mobility. .

## 2018-09-07 NOTE — TELEPHONE ENCOUNTER
Called and spoke to Yisel, she would like Mono's procedure to be moved to later on on 10/01/2018 as he cannot do earlier than 9 AM.  Yisel informed that Mono is now scheduled at 12:15 PM with an arrival time of 10:15 AM.  [employer] message will be sent confirming changes.     SR 09/07/2018 @ 1143 A

## 2018-09-07 NOTE — PROGRESS NOTES
Dear Dr. King Louis,  Medicare Home Health regulations requires [facility] to provide an initial assessment visit either within 48 hours of the patient's return home, or on the physician ordered Start of Care date.    There will be a delay in the Initial Assessment for Mono Varghese; MRN [id number]  The Start of care will be 9/11/18 date as patient will be out of town over the weekend.      Sincerely [facility]  Edyta Mccullough  [phone]

## 2018-09-07 NOTE — PLAN OF CARE
Problem: Patient Care Overview  Goal: Plan of Care/Patient Progress Review  Outcome: Adequate for Discharge Date Met: 09/06/18  DISCHARGE                         9/6/2018  7:27 PM  ----------------------------------------------------------------------------  Discharged to: Home  Via: private transportation  Accompanied by: Family  Discharge Instructions: 2g sodium diet, regular activity, medications, follow up appointments, when to call the MD, aftercare instructions.  Prescriptions: To be filled by discharge pharmacy; medication list reviewed & sent with pt  Follow Up Appointments: arranged; information given  Belongings: All sent with pt  IV: Port deaccessed by ELANA Marvin  Telemetry: d/c'd  Pt exhibits understanding of above discharge instructions; all questions answered.    Discharge Paperwork: Signed, copied, and sent home with patient.

## 2018-09-07 NOTE — PLAN OF CARE
Physical Therapy Discharge Summary    Reason for therapy discharge:    Discharged to mother-in-law's home with assist from pt's wife and mother-in-law.    Progress towards therapy goal(s). See goals on Care Plan in [facility] electronic health record for goal details.  Goals partially met and adequate for discharge. Barriers to achieving goals: discharge from facility.    Therapy recommendation(s):    Recommend outpatient PT/OT to progress strength, balance, and functional endurance in order to increase safety and independence with mobility and ADLs.

## 2018-09-07 NOTE — TELEPHONE ENCOUNTER
[facility] Call Center    Phone Message    May a detailed message be left on voicemail: yes    Reason for Call: Other: Pt's wife Yisel called to try and reschedule the upcoming procedure with Dr. Howard. Please call Yisel to reschedule, patient is unable to do early mornings    Action Taken: Message routed to:  [facility] ([facility]): MARGRET ALBARADO

## 2018-09-10 NOTE — PROGRESS NOTES
[facility] now requests orders and shares plan of care/discharge summaries for some patients through [employer].  Please REPLY TO THIS MESSAGE OR ROUTE BACK TO THE AUTHOR in order to give authorization for orders when needed.  This is considered a verbal order, you will still receive a faxed copy of orders for signature.  Thank you for your assistance in improving collaboration for our patients.    ORDER SN 2w1, 1w1, 1 prn. PT eval and treat    MD SUMMARY/PLAN OF CARE    SN for med mgmt/education/set up, DM education, skin integrity assessments, disease mgmt/education safety education.    PT for gait training, HEP and home safety.    Thanks,    Edith BeltranRN

## 2018-09-11 NOTE — PROGRESS NOTES
"  PSYCHIATRY CLINIC PROGRESS NOTE   The initial diagnostic evaluation was on 8/13/14.  Date of the most recent transfer of care eval is 10/05/16    Pertinent Background:  This patient first experienced mental health issues in childhood and has received treatment for ADHD, depression.  See transfer evaluation for detailed history.  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 Found down by wife w/ variceal bleeding and was in coma in 2016, ?anoxic brain injury.      Psych critical item history includes trauma hx and substance use treatment .    INTERIM HISTORY                                                 Mono Varghese is a 47 year old male who was last seen on 8/17/17 for transfer eval on at which time mirtazapine was increased.  The patient reports good treatment adherence.  History was provided by the patient who was a fair historian.  Since the last visit:    Trying to find a job but feels that  who is supposed to be helping \"is avoiding us\". Patient himself feels that he is \"not quite ready to go back to work\". 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     Mood: \"ok\"    Anger: No outbursts, no aggression.     Sleep: \"pretty good\"    Wife got a very good job at [employer] \"she loves it\".     Stable MELD score.     Plays poker on Wednesday " "nights but is on a \"severe losing streak\".     RECENT SYMPTOMS:   DEPRESSION:  reports-low energy, excessive guilt, feeling worthless and poor concentration /memory;  DENIES- suicidal ideation , depressed mood, anhedonia and low energy  SLEEP:  improved    EATING DISORDER: none      RECENT SUBSTANCE USE:     ALCOHOL- none          TOBACCO- none               CAFFEINE- rare sodies  OPIOIDS- occasional for pain       NARCAN KIT- N/A       CANNABIS- none          OTHER ILLICIT DRUGS- none     CURRENT SOCIAL HISTORY:  FINANCIAL SUPPORT- UNKNOWN       CHILDREN- one child         LIVING SITUATION- housed with wife      SOCIAL/ SPIRITUAL SUPPORT- unknown       FEELS SAFE AT HOME- Yes         MEDICAL ROS:  Reports imbalance   Denies weight gain, sedation and dizziness    PSYCH and CD Critical Summary Points since 2017           None    PAST PSYCH MED TRIALS   see EMR Problem List: Hx of psychiatric care    MEDICAL / SURGICAL HISTORY                                   CARE TEAM:   PCP- Missy WORKMAN primary care Oncologist  Neurosurg Dr. Aranda  Hepatology Dr. Callahan          Therapist- Donald Herring         Neurologic Hx [head injury etc]:  Craniotomy, ? Traumatic fall  Patient Active Problem List   Diagnosis     Type 2 diabetes mellitus (H)     Moderate major depression (H)     LENA (obstructive sleep apnea)-moderate (AHI 16)     Oligoastrocytoma of parietal lobe (H)     ADHD (attention deficit hyperactivity disorder)     Financial difficulties     Thrombocytopenia (H)     Partial epilepsy with impairment of consciousness (H)     Cirrhosis of liver (H)     Pulmonary nodules     Myopic astigmatism of both eyes     Presbyopia     Ringing in ears, unspecified laterality     GIB (gastrointestinal bleeding)     Portal vein thrombosis     Advance care planning     Chronic pain     Hyperlipidemia LDL goal <100     Pancytopenia (H)     Hypothyroidism     Malnutrition (H)     Hx of psychiatric care     " PGY 1 Note discussed with supervising resident and primary attending    Patient is a 66y old  Female who presents with a chief complaint of vertigo and dyspnea (11 Sep 2018 12:23)      INTERVAL HPI/OVERNIGHT EVENTS: Patient complains of dizziness on sitting and walking.    MEDICATIONS  (STANDING):  amLODIPine   Tablet 10 milliGRAM(s) Oral daily  aspirin  chewable 81 milliGRAM(s) Oral daily  atorvastatin 40 milliGRAM(s) Oral at bedtime  influenza   Vaccine 0.5 milliLiter(s) IntraMuscular once  insulin glargine Injectable (LANTUS) 8 Unit(s) SubCutaneous at bedtime  insulin lispro (HumaLOG) corrective regimen sliding scale   SubCutaneous Before meals and at bedtime  losartan 100 milliGRAM(s) Oral daily  meclizine 25 milliGRAM(s) Oral three times a day  predniSONE   Tablet 40 milliGRAM(s) Oral daily    MEDICATIONS  (PRN):  ALPRAZolam 1 milliGRAM(s) Oral daily PRN Anxiety      __________________________________________________  REVIEW OF SYSTEMS:    CONSTITUTIONAL: No fever,   EYES: no acute visual disturbances  NECK: No pain or stiffness  RESPIRATORY: No cough; No shortness of breath  CARDIOVASCULAR: No chest pain, no palpitations  GASTROINTESTINAL: No pain. No nausea or vomiting; No diarrhea   NEUROLOGICAL: No headache or numbness, no tremors, dizziness on sitting  MUSCULOSKELETAL: No joint pain, no muscle pain  GENITOURINARY: no dysuria, no frequency, no hesitancy  PSYCHIATRY: no depression , no anxiety  ALL OTHER  ROS negative        Vital Signs Last 24 Hrs  T(C): 36.8 (11 Sep 2018 14:32), Max: 37.2 (10 Sep 2018 19:44)  T(F): 98.3 (11 Sep 2018 14:32), Max: 99 (10 Sep 2018 19:44)  HR: 70 (11 Sep 2018 14:32) (50 - 70)  BP: 136/66 (11 Sep 2018 14:32) (113/69 - 136/66)  BP(mean): --  RR: 18 (11 Sep 2018 14:32) (16 - 18)  SpO2: 97% (11 Sep 2018 14:32) (97% - 99%)    ________________________________________________  PHYSICAL EXAM:  GENERAL: NAD  HEENT: Normocephalic;  conjunctivae and sclerae clear; moist mucous membranes;   NECK : supple  CHEST/LUNG: Clear to auscultation bilaterally with good air entry   HEART: S1 S2  regular; no murmurs, gallops or rubs  ABDOMEN: Soft, Nontender, Nondistended; Bowel sounds present  EXTREMITIES: no cyanosis; no edema; no calf tenderness  SKIN: warm and dry; no rash  NERVOUS SYSTEM:  Awake and alert; Oriented  to place, person and time ; no new deficits    _________________________________________________  LABS:                        14.1   5.5   )-----------( 132      ( 11 Sep 2018 07:11 )             44.7     09-11    139  |  109<H>  |  19<H>  ----------------------------<  152<H>  4.2   |  25  |  0.84    Ca    8.6      11 Sep 2018 07:11          CAPILLARY BLOOD GLUCOSE      POCT Blood Glucose.: 288 mg/dL (11 Sep 2018 16:44)  POCT Blood Glucose.: 364 mg/dL (11 Sep 2018 12:04)  POCT Blood Glucose.: 164 mg/dL (11 Sep 2018 08:20)  POCT Blood Glucose.: 151 mg/dL (10 Sep 2018 22:35)        RADIOLOGY & ADDITIONAL TESTS:    Imaging Personally Reviewed:  YES    Consultant(s) Notes Reviewed:   YES    Care Discussed with Consultants : YES     Plan of care was discussed with patient and /or primary care giver; all questions and concerns were addressed and care was aligned with patient's wishes. Encephalopathy, hepatic (H)       ALLERGY                                No clinical screening - see comments; Tegaderm transparent dressing (informational only); and Latex  MEDICATIONS                               Current Outpatient Prescriptions   Medication Sig Dispense Refill     dulaglutide (TRULICITY) 1.5 MG/0.5ML pen Inject 1.5 mg Subcutaneous every 7 days 6 mL 1     hydrOXYzine (ATARAX) 25 MG tablet Take 1 tablet (25 mg) by mouth 2 times daily 180 tablet 1     HYDROcodone-acetaminophen (NORCO) 5-325 MG per tablet Take 2 tablets by mouth every 6 hours as needed for moderate to severe pain 60 tablet 0     cyanocobalamin (VITAMIN  B-12) 1000 MCG tablet 1 tablet (1,000 mcg) by Per G Tube route daily 130 tablet 2     sulfamethoxazole-trimethoprim (BACTRIM DS/SEPTRA DS) 800-160 MG per tablet Take 1 tablet by mouth 2 times daily 60 tablet 5     mirtazapine (REMERON) 15 MG tablet Take 2 tablets (30 mg) by mouth At Bedtime 30 tablet 3     gabapentin (NEURONTIN) 100 MG capsule Take 1 capsule (100 mg) by mouth At Bedtime 30 capsule 3     traZODone (DESYREL) 50 MG tablet Take 1 tablet (50 mg) by mouth nightly as needed for sleep 30 tablet 3     pravastatin (PRAVACHOL) 80 MG tablet TAKE ONE TABLET BY MOUTH ONCE DAILY 90 tablet 1     mirtazapine (REMERON) 15 MG tablet Take 1 tablet (15 mg) at bedtime.**Must attend next appointment for further refills** 14 tablet 0     phenytoin (DILANTIN) 30 MG CR capsule Take 4 capsules (120 mg) by mouth 2 times daily 240 capsule 1     lactulose (CHRONULAC) 10 GM/15ML solution Take 7.5 mLs (5 g) by mouth 2 times daily 473 mL 1     simethicone (MYLICON) 80 MG chewable tablet Take 1 tablet (80 mg) by mouth every 6 hours as needed for cramping 180 tablet 1     cholecalciferol (VITAMIN  -D) 1000 UNITS capsule Take 1 capsule (1,000 Units) by mouth daily 90 capsule 1     thiamine 100 MG tablet Take 1 tablet (100 mg) by mouth daily 100 tablet 1     glipiZIDE (GLUCOTROL XL) 10 MG 24 hr tablet  Take 1 tablet (10 mg) by mouth daily 90 tablet 1     rifaximin (XIFAXAN) 550 MG TABS tablet Take 1 tablet (550 mg) by mouth 2 times daily 60 tablet 11     blood glucose monitoring (ONE TOUCH ULTRA) test strip Use to test blood sugars 4 times daily as needed or as directed. 300 each 4     levothyroxine (SYNTHROID/LEVOTHROID) 88 MCG tablet Take 1 tablet (88 mcg) by mouth daily 90 tablet 3     omeprazole (PRILOSEC) 20 MG CR capsule Take 2 capsules (40 mg) by mouth 2 times daily 120 capsule 11     ferrous sulfate (IRON) 325 (65 FE) MG tablet Take 1 tablet (325 mg) by mouth 2 times daily 200 tablet 3     Calcium Carb-Cholecalciferol (CALCIUM 500 +D) 500-400 MG-UNIT TABS Take 500 mg by mouth daily 90 tablet 1     multivitamin, therapeutic with minerals (THERA-VIT-M) TABS Take 1 tablet by mouth 2 times daily       ONE TOUCH LANCETS MISC by In Vitro route 4 times daily as needed 100 each prn     VITALS   BP (!) 148/91  Pulse 79  Wt 97.7 kg (215 lb 6.4 oz)  BMI 30.91 kg/m2   MENTAL STATUS EXAM                                                           Alertness: alert  and slow to respond  Appearance: casually groomed  Behavior/Demeanor: cooperative, pleasant and calm, with fair  eye contact   Speech: increased latency of response and slowed  Language: word finding difficulty  Psychomotor: normal or unremarkable  Mood: ok  Affect: restricted but more reactive and appropriate; was congruent to mood; was congruent to content  Thought Process/Associations: thought blocking  Thought Content:  Reports none;  Denies suicidal and violent ideation  Perception:  Reports none;  Denies auditory hallucinations and visual hallucinations  Insight: good  Judgment: good  Cognition: does not appear grossly intact; formal cognitive testing was not done    LABS and DATA   RATING SCALES:  N/A    PHQ9 TODAY = 13  PHQ-9 SCORE 10/5/2016 2/22/2017 8/17/2017   Total Score - - -   Total Score 20 14 18     DIAGNOSIS     Moderate episode of recurrent  major depressive disorder (H)  Mild episode of recurrent major depressive disorder (H)  Primary insomnia  Obstructive sleep apnea  Neurocognitive disorder    ASSESSMENT                                     47 yo M h/o oligoastrocytoma s/p resection + xrt 2013, anoxic brain injury?, EtOH cirrhosis, hypothyroidism, pancytopenia, cognitive impairment, seizure disorder, muliple childhood AEs, major depression. Mood, energy and sleep have improved. Attention and memory retrieval remain a big concern.  Exam with low amplitude choreoathetotic movements in right hand, delayed responses, word finding difficulty, slow articulation, improved brighter more reactive affect, no SI/HI, no perceptual disturbances. Recent discharge labs 8/8/17show pancytopenia at baseline, elevated TSH w/ nl T4, MoCA reportedly 10/30 on recent hospitalization. Stable MELD score. No brain MRI available for review.     Depressive symptoms are likely of multifactorial etiology including underlying depressive diathesis vs major depression complicated by neurocognitive impairment, LENA and situational factors (can no longer do the work he loves). Sub-clinical hypothyroidism may also be contributing. Mood lability would be better treated by a different anti-epileptic than phenytoin (i.e. Valproic acid) but most have hepatic metabolism and we are limited by liver disease. Agree w/ referral for neurocognitive testing. Will start methylphenidate today after consultation with neurologist Dr. English. Advised patient of side effects risks, including seizure and EtOH relapse.                   [place] PRESCRIPTION MONITORING PROGRAM [] was not checked today:  will be checked next visit.    PSYCHOTROPIC DRUG INTERACTIONS:   Concurrent use of PHENYTOIN and TRAZODONE may result in increased phenytoin serum concentrations and an increased risk of phenytoin toxicity (ataxia, hyperreflexia, nystagmus, tremor).   Concurrent use of PHENYTOIN and HYDROXYZINE may result in  decreased phenytoin and/ or theophylline exposure.   Concurrent use of MIRTAZAPINE and SEROTONERGIC AGENTS may result in increased risk of serotonin syndrome.       MANAGEMENT:  Monitoring for adverse effects   PLAN                                                                                                       1) PSYCHOTROPIC MEDICATIONS:  - Increase mirtazapine to 45mg QHS  -Start methylphenidate 10mg qAM, increase to BID after one week as tolerated.   -continue gabapentin 100mg QHS  -Continue trazadone 50mg QHS      2) THERAPY:    Continue  TREATMENT PLAN   Date revised  -  NA   Date next due- NA    3) NEXT DUE:    Labs- N/A  EKG- N/A   Rating Scales- N/A    4) REFERRALS:    NONE    5) RTC: 2-3wks    6) CRISIS NUMBERS:   Provided routinely in AVS.  Especially emphasized:  [facility] [phone] (clinic)    [phone] (after hours)  TREATMENT RISK STATEMENT:  The risks, benefits, alternatives and potential adverse effects have been discussed and are understood by the pt. The pt understands the risks of using street drugs or alcohol. There are no medical contraindications, the pt agrees to treatment with the ability to do so. The pt knows to call the clinic for any problems or to access emergency care if needed.  Medical and substance use concerns are documented above.  Psychotropic drug interaction check was done, including changes made today.      Patient staffed in clinic with Dr. Garcia who will sign the note.  Supervisor is Dr. Magana      Supervisor Attestation:  I saw the patient with the resident, and participated in key portions of the service, including the mental status examination and developing the plan of care. I reviewed key portions of the history with the resident. I agree with the findings and plan as documented in this note.  Noah Garcia MD

## 2018-09-13 NOTE — PROGRESS NOTES
Edith from [facility] called today requesting these orders be signed and she wants to be called when they are so she can watch for them.    Message sent to the clinic.      Kathleen M Doege RN

## 2018-09-13 NOTE — PROGRESS NOTES
Verbal order can be given for this, however provider will still need to have order sent to him. Tiffanie Lentz LPN 9/13/2018 10:50 AM      Informed [facility]. Tiffanie Lentz LPN 9/13/2018 11:34 AM

## 2018-09-14 PROBLEM — G93.40 ENCEPHALOPATHY: Status: ACTIVE | Noted: 2018-01-01

## 2018-09-14 NOTE — ED TRIAGE NOTES
Pt BIBA with complaints of confusion and weakness. Pt incontinent of bowel and bladder yesterday. Pt fell out of bed today, no injuries noted by EMS. . Pt confused upon arrival.

## 2018-09-14 NOTE — PROGRESS NOTES
Message received to add a flex sig to his EGD with Dr. Kelly:  I am happy to do a flex sig and coil/glue under Dr Tejada's supervision. I believe he is oncall and should be available to guide me as needed     Order placed and sent to scheduling.     Called and left message for patient that we have added a flex sig to his previously scheduled EGD. Asked that he call this RN back to confirm receipt of message and to review prep instructions.   - will need to take 2 fleet enemas prior to procedure.     Shalini GERMAN, RN Coordinator  Dr. Aragon, Dr. Dickens & Dr. Kelly   [facility]  [phone]

## 2018-09-14 NOTE — IP AVS SNAPSHOT
[address] [address]    Phone:  [phone]                                       After Visit Summary   9/14/2018    Mono Varghese    MRN: [id number]           After Visit Summary Signature Page     I have received my discharge instructions, and my questions have been answered. I have discussed any challenges I see with this plan with the nurse or doctor.    ..........................................................................................................................................  Patient/Patient Representative Signature      ..........................................................................................................................................  Patient Representative Print Name and Relationship to Patient    ..................................................               ................................................  Date                                   Time    ..........................................................................................................................................  Reviewed by Signature/Title    ...................................................              ..............................................  Date                                               Time          22EPIC Rev 08/18

## 2018-09-14 NOTE — CONSULTS
[facility], [place]      Neurology Stroke Code    Patient Name: Mono Varghese  : 1970 MRN#: [id number]    STROKE DATA     Stroke Code:  Time called:  2018 1151  Time patient seen:  2018 1154  Onset of symptoms:  2018 0900  Last known normal (pt's baseline):  18 2100  Head CT read by Anisa Gaston  at:  2018 1206  Stroke Code de-escalated at 2018 1218 after discussion with Anisa Gaston  due to presence of contraindications for both intravenous and intra-arterial stroke treatments.      TPA treatment:  Not given due to outside the time window.  No thrombectomy because no LVO and MRS>3 PTA and no focal deficits on exam  [employer] Stroke Scale (at presentation)  NIHSS done at:  time patient seen      Score    Level of consciousness:  (0)   Alert, keenly responsive    LOC questions:  (2)   Answers neither question correctly    LOC commands:  (0)   Performs both tasks correctly    Best gaze:  (0)   Normal    Visual:  (0)   No visual loss    Facial palsy:  (0)   Normal symmetrical movements    Motor arm (left):  (0)   No drift    Motor arm (right):  (0)   No drift    Motor leg (left):  (0)   No drift    Motor leg (right):  (0)   No drift    Limb ataxia:  (0)   Absent    Sensory:  (0)   Normal- no sensory loss    Best language:  (2)   Severe aphasia    Dysarthria:  (0)   Normal    Extinction and inattention:  (0)   No abnormality        NIHSS Total Score:  4           ASSESSMENT & RECOMMENDATONS     Stroke code activated due to right sided weakness and garbled speech. This is a 47 year old man w hx astrocytoma left parietal s/p resection, liver disease, and epilepsy (last seizure 2 years ago, on phenytoin), who presents w acute right sided weakness and garbled speech noted this am by his wife. She did not assess face involvement. He was already getting progressive weakness yesterday, but there was no focal deficit when he went  to bed at 9 am. The focality was new this morning. No report of focal shaking but he has been more tremulous generally.   CT/CTA did not reveal LVO, just his chronic left parietal encephalomalacia.  With a symmetric exam, no LVO, and poor baseline MRS, intervention is not indicated, TPA not indicated.   His exam and presentation is more consistent with encephalopathy, such as hepatic encephalopathy or phenytoin toxicity. Ammonia and phenytoin levels should be checked. If phenytoin level is high, it should be held until in a therapeutic range, and then re-started at a reduced dose. Pharmacy may be helpful in that transition.     On exam:  Awake and alert, tracks, follows some commands  Face symmetric, no dysarthria, unable to properly assess visual fields, EOM without nystagmus.  Aphasic, can name 1 object but not others, unable to state age. Attempts at repeating sentence are truncated. Little spontaneous speech.  No pronator drift, no leg drift but notable asterixis throughout    The patient will be managed by the primary team and  we will sign off at this time.  Thank you for the consult.  Contact the stroke team if you have any further questions    The patient was discussed with the staff, Dr. Lynch.    Anisa Gaston MD   Pager: [phone]

## 2018-09-14 NOTE — IP AVS SNAPSHOT
MRN:[id number]                      After Visit Summary   9/14/2018    Mono Varghese    MRN: [id number]           Thank you!     Thank you for choosing [facility] for your care. Our goal is always to provide you with excellent care. Hearing back from our patients is one way we can continue to improve our services. Please take a few minutes to complete the written survey that you may receive in the mail after you visit with us. Thank you!        Patient Information     Date Of Birth          1970        Designated Caregiver       Most Recent Value    Caregiver    Will someone help with your care after discharge? yes    Name of designated caregiver Yisel Varghese    Phone number of caregiver [phone]    Caregiver address [address] [place] [address]      About your hospital stay     You were admitted on:  September 14, 2018 You last received care in the:  [address] [facility] [facility]    You were discharged on:  September 18, 2018        Reason for your hospital stay       You were admitted with increased confusion, weakness, cough, and difficulty speaking. We did brain MRI and CT, an infectious work-up to look for pneumonia, UTI, or SBP, checked your thyroid and vitamin levels, and got an EEG to look for seizures. 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 We also think that there was some amount of delirium (intermittent confusion) which is common in patients who are hospitalized.                  Who to Call     For medical emergencies, please call 911.  For non-urgent questions about your medical care, please call your primary care provider or clinic, [phone]          Attending Provider      Provider Specialty    Ann Richards MD Emergency Medicine    Akanksha Self MD Internal Medicine    Kaycee Lynn MD Internal Medicine       Primary Care Provider Office Phone # Fax #    [place] [phone]      After Care Instructions     Activity       Your activity upon discharge: activity as tolerated            Diet       Follow this diet upon discharge: 2g sodium restriction            Discharge Instructions       Continue taking lactulose with the goal of 3-4 bowel movements per day  Continue to follow with your regular neurologist, neurosurgeon and gastroenterologist   Home PT/OT ordered                  Follow-up Appointments     Adult [facility] Follow-up and recommended labs and tests       Follow up with primary care provider, [place], within 7 days for hospital follow- up.  No follow up labs or test are needed.      Appointments on [place] and/or [facility] (with [facility] or [facility] provider or service). Call [phone] if you haven't heard regarding these appointments within 7 days of discharge.                  Your next 10 appointments already scheduled     Sep 26, 2018  1:50 PM CDT   (Arrive by 1:35 PM)   Return Visit with [place] MD   [facility] ([facility] [place])    [address] [facility]  4th [facility] [address] [phone]            Sep 26, 2018  3:20 PM CDT   (Arrive by 3:05 PM)   PRE-OP with [place] MD   [facility] ([facility] [place])    [address] [id number] [phone]            Oct 01, 2018   Procedure with Guru Jose Kelly MD   [facility], [place], Same Day Surgery (--)    [address] [facility] [address]   [phone]            Oct 02, 2018 10:00 AM CDT   Adult Med Follow UP with Leroy Alcaraz MD   [facility] ([facility])    [address] [address]   [phone]            Oct 02, 2018  1:30 PM CDT   (Arrive by 1:15 PM)   Return Seizure with Anil English MD   [facility] Neurology ([facility])    [address] [place] [address]  3rd [facility] [address]   [phone]            Nov 21, 2018 10:45 AM CST   MR ABDOMEN W/O & W CONTRAST with UCMR1   [facility] [place] [facility] ([facility])    [address] [place] [address]   [phone]           Take your medicines as usual, unless your doctor tells you not to. Bring a list of your current medicines to your exam (including vitamins, minerals and over-the-counter drugs). Also bring the results of similar scans you may have had.    You may or may not receive IV contrast for this exam pending the discretion of the Radiologist.   Do not eat or drink for 6 hours prior to exam.  The MRI machine uses a strong magnet. Please wear clothes without metal (snaps, zippers). A sweatsuit works well, or we may give you a hospital gown.  Please remove any body piercings and hair extensions before you arrive. You will also remove watches, jewelry, hairpins, wallets, dentures, partial dental plates and hearing aids. You may wear contact lenses, and you may be able to wear your rings. We have a safe place to keep your personal items, but it is safer to leave them at home.  **IMPORTANT** THE INSTRUCTIONS BELOW ARE ONLY FOR THOSE PATIENTS WHO HAVE BEEN PRESCRIBED SEDATION OR GENERAL ANESTHESIA DURING THEIR MRI PROCEDURE:  IF YOUR DOCTOR PRESCRIBED ORAL SEDATION (take medicine to help you relax during your exam):   You must get the medicine from your doctor (oral medication) before you arrive. Bring the medicine to the exam. Do not take it at home. You ll be told when to take it upon arriving for your exam.   Arrive one hour early. Bring someone who can take you home after the test. Your medicine will make you  sleepy. After the exam, you may not drive, take a bus or take a taxi by yourself.  IF YOUR DOCTOR PRESCRIBED IV SEDATION:   Arrive one hour early. Bring someone who can take you home after the test. Your medicine will make you sleepy. After the exam, you may not drive, take a bus or take a taxi by yourself.   No eating 6 hours before your exam. You may have clear liquids up until 4 hours before your exam. (Clear liquids include water, clear tea, black coffee and fruit juice without pulp.)  IF YOUR DOCTOR PRESCRIBED ANESTHESIA (be asleep for your exam):   Arrive 1 1/2 hours early. Bring someone who can take you home after the test. You may not drive, take a bus or take a taxi by yourself.   No eating 8 hours before your exam. You may have clear liquids up until 4 hours before your exam. (Clear liquids include water, clear tea, black coffee and fruit juice without pulp.)   You will spend four to five hours in the recovery room.  If you have any questions, please contact your Imaging Department exam site.            Feb 13, 2019  9:00 AM CST   Lab with  LAB   [facility] ([facility])    [address] [facility]  1st Floor  [facility] [address] [phone]            Feb 13, 2019 10:00 AM CST   (Arrive by 9:45 AM)   Return General Liver with Beatriz Tanner MD   [facility] Hepatology ([facility])    [address] [place] [address]  [address]  [facility] [id number] [phone]            Apr 26, 2019 10:00 AM CDT   MR BRAIN W/O & W CONTRAST with [facility] [facility] ([facility])    [address] [facility]  1st Floor  [facility] [address] [phone]           Take your medicines as usual, unless your doctor tells you not to. Bring a list of your current medicines to your exam (including vitamins, minerals and over-the-counter drugs).  You may or may not receive intravenous (IV) contrast for this exam pending the  discretion of the Radiologist.  You do not need to do anything special to prepare.  The MRI machine uses a strong magnet. Please wear clothes without metal (snaps, zippers). A sweatsuit works well, or we may give you a hospital gown.  Please remove any body piercings and hair extensions before you arrive. You will also remove watches, jewelry, hairpins, wallets, dentures, partial dental plates and hearing aids. You may wear contact lenses, and you may be able to wear your rings. We have a safe place to keep your personal items, but it is safer to leave them at home.  **IMPORTANT** THE INSTRUCTIONS BELOW ARE ONLY FOR THOSE PATIENTS WHO HAVE BEEN PRESCRIBED SEDATION OR GENERAL ANESTHESIA DURING THEIR MRI PROCEDURE:  IF YOUR DOCTOR PRESCRIBED ORAL SEDATION (take medicine to help you relax during your exam):   You must get the medicine from your doctor (oral medication) before you arrive. Bring the medicine to the exam. Do not take it at home. You ll be told when to take it upon arriving for your exam.   Arrive one hour early. Bring someone who can take you home after the test. Your medicine will make you sleepy. After the exam, you may not drive, take a bus or take a taxi by yourself.  IF YOUR DOCTOR PRESCRIBED IV SEDATION:   Arrive one hour early. Bring someone who can take you home after the test. Your medicine will make you sleepy. After the exam, you may not drive, take a bus or take a taxi by yourself.   No eating 6 hours before your exam. You may have clear liquids up until 4 hours before your exam. (Clear liquids include water, clear tea, black coffee and fruit juice without pulp.)  IF YOUR DOCTOR PRESCRIBED ANESTHESIA (be asleep for your exam):   Arrive 1 1/2 hours early. Bring someone who can take you home after the test. You may not drive, take a bus or take a taxi by yourself.   No eating 8 hours before your exam. You may have clear liquids up until 4 hours before your exam. (Clear liquids include water,  clear tea, black coffee and fruit juice without pulp.)   You will spend four to five hours in the recovery room.  Please call the Imaging Department at your exam site with any questions.            May 03, 2019 10:45 AM CDT   (Arrive by 10:30 AM)   Return Visit with Lauro Shaw MD   [facility] ([facility] [place])    [address] [place] [address]  [address] [place] [address] [phone]              Additional Services     Home Care PT Referral for Hospital Discharge       Physical Therapy and Occupational therapy to eval and treat.  _______________________  [facility]  Phone  [phone]  Fax  [phone]  ______________________    Your provider has ordered home care - physical and occupational therapy If you have not been contacted within 2 days of your discharge please call the department phone number listed on the top of this document.            Home care nursing referral       RN skilled nursing visit. RN to assess vital signs and weight, respiratory and cardiac status, patients ability to take and record daily blood pressure, temp and weight, pain level and activity tolerance, hydration, nutrition and bowel status and home safety.  RN to teach medication management.  RN to provide lab draws.    _______________________  [facility]  Phone  [phone]  Fax  [phone]  ______________________    [facility] to resume all previous home care orders.       Your provider has ordered home care nursing services. If you have not been contacted within 2 days of your discharge please call the inpatient department phone number at [phone] .            Occupational Therapy Referral       *This therapy referral will be filtered to a centralized scheduling office at [facility] and the patient will receive a call to schedule an appointment at a [facility] location most convenient for them. *     [facility] provides  "Occupational Therapy evaluation and treatment and many specialty services across the [facility].  If requesting a specialty program, please choose from the list below.    If you have not heard from the scheduling office within 2 business days, please call [phone] for all locations, with the exception of [place], please call [phone] and [place], please call [phone]    Treatment: Evaluation & Treatment  Special Instructions/Modalities: n/a  Special Programs: Assistive Technology, Cognition Assessment      Please be aware that coverage of these services is subject to the terms and limitations of your health insurance plan.  Call member services at your health plan with any benefit or coverage questions.      **Note to Provider:  If you are referring outside of [facility] for the therapy appointment, please list the name of the location in the \"special instructions\" above, print the referral and give to the patient to schedule the appointment.            Physical Therapy Referral       *This therapy referral will be filtered to a centralized scheduling office at [facility] and the patient will receive a call to schedule an appointment at a [facility] location most convenient for them. *     [facility] provides Physical Therapy evaluation and treatment and many specialty services across the [facility] system.  If requesting a specialty program, please choose from the list below.    If you have not heard from the scheduling office within 2 business days, please call [phone] for all locations, with the exception of [place], please call [phone] and [place], please call [phone]  Treatment: Evaluation & Treatment  Special Instructions/Modalities: n/a  Special Programs: Balance/Vestibular    Please be aware that coverage of these services is subject to the terms and limitations of your health insurance plan.  Call member services at your health plan " "with any benefit or coverage questions.      **Note to Provider:  If you are referring outside of [facility] for the therapy appointment, please list the name of the location in the \"special instructions\" above, print the referral and give to the patient to schedule the appointment.                  Pending Results     Date and Time Order Name Status Description    9/15/2018 0840 POC US Guide for Paracentesis In process     9/15/2018 0637 Blood culture Preliminary     9/15/2018 0637 Blood culture Preliminary     9/15/2018 0637 fluid culture - Aerobic Bacterial Preliminary             Statement of Approval     Ordered          09/18/18 1219  I have reviewed and agree with all the recommendations and orders detailed in this document.  EFFECTIVE NOW     Approved and electronically signed by:  Maribell Bolden MD             Admission Information     Date & Time Provider Department Dept. Phone    9/14/2018 Kaycee Lynn MD [address] [facility] [place] [phone]      Your Vitals Were     Blood Pressure Pulse Temperature Respirations Height Weight    101/60 (BP Location: Left arm) 60 96.4  F (35.8  C) (Oral) 16 1.778 m (5' 10\") 77.2 kg (170 lb 4.8 oz)    Pulse Oximetry BMI (Body Mass Index)                100% 24.44 kg/m2          [employer] Information     [email or web address] gives you secure access to your electronic health record. If you see a primary care provider, you can also send messages to your care team and make appointments. If you have questions, please call your primary care clinic.  If you do not have a primary care provider, please call [phone] and they will assist you.        Care EveryWhere ID     This is your Care EveryWhere ID. This could be used by other organizations to access your [facility] medical records  [id number]        Equal Access to Services     SAMUEL FAIR : olesya Holliday, josue mario. So wa " [phone].    ATENCIÓN: Si pam manzano, tiene a chiang disposición servicios gratuitos de asistencia lingüística. Manuelito betancourt [phone].    We comply with applicable federal civil rights laws and [place] laws. We do not discriminate on the basis of race, color, national origin, age, disability, sex, sexual orientation, or gender identity.               Review of your medicines      CONTINUE these medicines which have NOT CHANGED        Dose / Directions    blood glucose monitoring test strip   Commonly known as:  ONETOUCH ULTRA   Used for:  Diabetes mellitus, type 2 (H)        Use to test blood sugars 4 times daily as needed or as directed.   Quantity:  400 each   Refills:  1       Calcium Carb-Cholecalciferol 500-400 MG-UNIT Tabs   Commonly known as:  calcium 500 +D   Used for:  Malnutrition (H)        Dose:  500 mg   Take 500 mg by mouth daily   Quantity:  90 tablet   Refills:  1       CANE, ANY MATERIAL   Used for:  Balance disorder        One cane   Quantity:  1 each   Refills:  0       ciclopirox 0.77 % cream   Commonly known as:  LOPROX   Used for:  Tinea pedis of both feet        Apply topically 2 times daily To feet and toenails.   Quantity:  90 g   Refills:  4       cyanocobalamin 1000 MCG Tabs        Dose:  1 tablet   Take 1 tablet by mouth daily   Refills:  0       dulaglutide 1.5 MG/0.5ML pen   Commonly known as:  TRULICITY   Used for:  DM type 2, goal HbA1c 7%-8% (H)        Dose:  1.5 mg   Inject 1.5 mg Subcutaneous every 7 days   Quantity:  6 mL   Refills:  1       ferrous sulfate 325 (65 Fe) MG tablet   Commonly known as:  IRON   Used for:  Other iron deficiency anemia        Dose:  1 tablet   Take 1 tablet (325 mg) by mouth 2 times daily   Quantity:  200 tablet   Refills:  3       furosemide 20 MG tablet   Commonly known as:  LASIX   Used for:  Other ascites        Dose:  20 mg   Take 1 tablet (20 mg) by mouth daily   Quantity:  30 tablet   Refills:  3       gabapentin 100 MG capsule   Commonly  known as:  NEURONTIN   Used for:  Mild episode of recurrent major depressive disorder (H)        Dose:  100 mg   Take 1 capsule (100 mg) by mouth At Bedtime   Quantity:  30 capsule   Refills:  3       HYDROcodone-acetaminophen 5-325 MG per tablet   Commonly known as:  NORCO   Used for:  Brain tumor (H)        Dose:  2 tablet   Take 2 tablets by mouth every 6 hours as needed for moderate to severe pain   Quantity:  60 tablet   Refills:  0       hydrOXYzine 25 MG tablet   Commonly known as:  ATARAX   Used for:  Itching, Anxiety        Dose:  25 mg   Take 1 tablet (25 mg) by mouth 2 times daily   Quantity:  180 tablet   Refills:  1       lactulose 10 GM/15ML solution   Commonly known as:  CHRONULAC   Used for:  Hepatic encephalopathy (H)        Dose:  20 g   Take 30 mLs (20 g) by mouth 3 times daily   Quantity:  946 mL   Refills:  3       levothyroxine 88 MCG tablet   Commonly known as:  SYNTHROID/LEVOTHROID   Used for:  Hypothyroidism        Dose:  88 mcg   Take 1 tablet (88 mcg) by mouth daily   Quantity:  90 tablet   Refills:  1       lidocaine 4 % Crea cream   Commonly known as:  LMX4   Used for:  Port catheter in place        Apply topically once as needed for mild pain   Quantity:  133 g   Refills:  1       methylphenidate 10 MG tablet   Commonly known as:  RITALIN   Used for:  Attention deficit hyperactivity disorder (ADHD), unspecified ADHD type        Dose:  10 mg   Take 1 tablet (10 mg) by mouth 2 times daily   Quantity:  60 tablet   Refills:  0       mirtazapine 45 MG tablet   Commonly known as:  REMERON   Used for:  Major depressive disorder with single episode, in partial remission (H)        Dose:  45 mg   Take 1 tablet (45 mg) by mouth At Bedtime   Quantity:  30 tablet   Refills:  2       multivitamin, therapeutic with minerals Tabs tablet        Dose:  1 tablet   Take 1 tablet by mouth 2 times daily   Refills:  0       omeprazole 20 MG CR capsule   Commonly known as:  priLOSEC   Used for:   Gastroesophageal reflux disease without esophagitis        Dose:  40 mg   Take 2 capsules (40 mg) by mouth 2 times daily   Quantity:  360 capsule   Refills:  3       ONETOUCH LANCETS Misc   Used for:  Type II or unspecified type diabetes mellitus without mention of complication, not stated as uncontrolled        by In Vitro route 4 times daily as needed   Quantity:  100 each   Refills:  prn       phenytoin 30 MG CR capsule   Commonly known as:  DILANTIN   Used for:  Oligoastrocytoma of parietal lobe (H)        Dose:  120 mg   Take 4 capsules (120 mg) by mouth 2 times daily   Quantity:  720 capsule   Refills:  11       pravastatin 80 MG tablet   Commonly known as:  PRAVACHOL   Used for:  High cholesterol        Dose:  80 mg   Take 1 tablet (80 mg) by mouth daily   Quantity:  90 tablet   Refills:  3       spironolactone 25 MG tablet   Commonly known as:  ALDACTONE   Used for:  Other ascites        Dose:  50 mg   Take 2 tablets (50 mg) by mouth daily   Quantity:  180 tablet   Refills:  1       thiamine 100 MG tablet   Used for:  Alcoholic cirrhosis of liver with ascites (H)        Dose:  100 mg   Take 1 tablet (100 mg) by mouth daily   Quantity:  100 tablet   Refills:  1       traZODone 50 MG tablet   Commonly known as:  DESYREL   Used for:  Primary insomnia        Dose:  50 mg   Take 1 tablet (50 mg) by mouth nightly as needed for sleep   Quantity:  30 tablet   Refills:  2       Vitamin D (Cholecalciferol) 1000 units Tabs        Dose:  1 tablet   Take 1 tablet by mouth daily   Refills:  0       XIFAXAN 550 MG Tabs tablet   Used for:  Hepatic encephalopathy (H)   Generic drug:  rifaximin        TAKE ONE TABLET BY MOUTH TWICE DAILY   Quantity:  60 tablet   Refills:  11                Protect others around you: Learn how to safely use, store and throw away your medicines at www.disposemymeds.org.             Medication List: This is a list of all your medications and when to take them. Check marks below indicate your  daily home schedule. Keep this list as a reference.      Medications           Morning Afternoon Evening Bedtime As Needed    blood glucose monitoring test strip   Commonly known as:  ONETOUCH ULTRA   Use to test blood sugars 4 times daily as needed or as directed.                                Calcium Carb-Cholecalciferol 500-400 MG-UNIT Tabs   Commonly known as:  calcium 500 +D   Take 500 mg by mouth daily                                CANE, ANY MATERIAL   One cane                                ciclopirox 0.77 % cream   Commonly known as:  LOPROX   Apply topically 2 times daily To feet and toenails.                                cyanocobalamin 1000 MCG Tabs   Take 1 tablet by mouth daily                                dulaglutide 1.5 MG/0.5ML pen   Commonly known as:  TRULICITY   Inject 1.5 mg Subcutaneous every 7 days                                ferrous sulfate 325 (65 Fe) MG tablet   Commonly known as:  IRON   Take 1 tablet (325 mg) by mouth 2 times daily   Last time this was given:  325 mg on 9/18/2018  9:17 AM                                furosemide 20 MG tablet   Commonly known as:  LASIX   Take 1 tablet (20 mg) by mouth daily   Last time this was given:  20 mg on 9/18/2018  9:17 AM                                gabapentin 100 MG capsule   Commonly known as:  NEURONTIN   Take 1 capsule (100 mg) by mouth At Bedtime                                HYDROcodone-acetaminophen 5-325 MG per tablet   Commonly known as:  NORCO   Take 2 tablets by mouth every 6 hours as needed for moderate to severe pain                                hydrOXYzine 25 MG tablet   Commonly known as:  ATARAX   Take 1 tablet (25 mg) by mouth 2 times daily                                lactulose 10 GM/15ML solution   Commonly known as:  CHRONULAC   Take 30 mLs (20 g) by mouth 3 times daily   Last time this was given:  20 g on 9/18/2018  9:16 AM                                levothyroxine 88 MCG tablet   Commonly known as:   SYNTHROID/LEVOTHROID   Take 1 tablet (88 mcg) by mouth daily   Last time this was given:  88 mcg on 9/18/2018  9:17 AM                                lidocaine 4 % Crea cream   Commonly known as:  LMX4   Apply topically once as needed for mild pain                                methylphenidate 10 MG tablet   Commonly known as:  RITALIN   Take 1 tablet (10 mg) by mouth 2 times daily   Last time this was given:  10 mg on 9/18/2018  9:17 AM                                mirtazapine 45 MG tablet   Commonly known as:  REMERON   Take 1 tablet (45 mg) by mouth At Bedtime   Last time this was given:  45 mg on 9/17/2018  9:33 PM                                multivitamin, therapeutic with minerals Tabs tablet   Take 1 tablet by mouth 2 times daily                                omeprazole 20 MG CR capsule   Commonly known as:  priLOSEC   Take 2 capsules (40 mg) by mouth 2 times daily   Last time this was given:  40 mg on 9/18/2018  9:17 AM                                ONETOUCH LANCETS Misc   by In Vitro route 4 times daily as needed                                phenytoin 30 MG CR capsule   Commonly known as:  DILANTIN   Take 4 capsules (120 mg) by mouth 2 times daily   Last time this was given:  120 mg on 9/18/2018  9:16 AM                                pravastatin 80 MG tablet   Commonly known as:  PRAVACHOL   Take 1 tablet (80 mg) by mouth daily   Last time this was given:  80 mg on 9/18/2018  9:17 AM                                spironolactone 25 MG tablet   Commonly known as:  ALDACTONE   Take 2 tablets (50 mg) by mouth daily   Last time this was given:  50 mg on 9/18/2018  9:17 AM                                thiamine 100 MG tablet   Take 1 tablet (100 mg) by mouth daily   Last time this was given:  100 mg on 9/18/2018  9:17 AM                                traZODone 50 MG tablet   Commonly known as:  DESYREL   Take 1 tablet (50 mg) by mouth nightly as needed for sleep                                Vitamin  D (Cholecalciferol) 1000 units Tabs   Take 1 tablet by mouth daily   Last time this was given:  1,000 Units on 9/18/2018  9:17 AM                                XIFAXAN 550 MG Tabs tablet   TAKE ONE TABLET BY MOUTH TWICE DAILY   Last time this was given:  550 mg on 9/18/2018  9:17 AM   Generic drug:  rifaximin

## 2018-09-14 NOTE — PLAN OF CARE
Problem: Patient Care Overview  Goal: Plan of Care/Patient Progress Review  Outcome: No Change  Pt arrived to unit [facility]B from [facility] at 1630 via stretcher with hospital transport, VSS upon arrival.  Pt spouse at the bedside is attentive to cares.  Pt is alert and oriented to self only.  Bed alarm on due to some impulsivity.  Pt denies pain.  Pt to complete chest x ray this evening.  No acute incidents this shift.  Continue to monitor and await MD orders.

## 2018-09-14 NOTE — PHARMACY-ADMISSION MEDICATION HISTORY
Admission medication history interview status for the 9/14/2018 admission is complete. See Epic admission navigator for allergy information, pharmacy, prior to admission medications and immunization status.     Medication history interview sources:  Patient's Wife, Reconcile Medication Records     Changes made to PTA medication list (reason)  Added: none  Deleted: mupirocin per wife- no longer using   Changed: none    Additional medication history information (including reliability of information, actions taken by pharmacist):  --Patient's wife verified all medications and helps manage his medication  --Patient last received all medications on 9/13/18 in the evening  --Patient has not been using Trulicity in over 3 weeks due to weight loss and control of blood sugars. Provider is aware and patient had appointment with provider today to address the medication  --Patient has a very specific regimen with his lactulose- wife states he takes 30mL in the morning. If by 1pm he has not had 2 bowels, then he takes a second dose of 30mL. It is titrated to 3-4 bowel movements a day but patient's wife said his response to lactulose can vary from either no response to many bowel movements in a short time.       Prior to Admission medications    Medication Sig Last Dose Taking? Auth Provider   Calcium Carb-Cholecalciferol (CALCIUM 500 +D) 500-400 MG-UNIT TABS Take 500 mg by mouth daily 9/13/2018 at Unknown time Yes Beatriz Tanner MD   ciclopirox (LOPROX) 0.77 % cream Apply topically 2 times daily To feet and toenails. 9/13/2018 at Unknown time Yes King Quiles DPM   cyanocobalamin 1000 MCG TABS Take 1 tablet by mouth daily 9/13/2018 at Unknown time Yes Unknown, Entered By History   dulaglutide (TRULICITY) 1.5 MG/0.5ML pen Inject 1.5 mg Subcutaneous every 7 days Past Month at Unknown time Yes King Louis MD   ferrous sulfate (IRON) 325 (65 FE) MG tablet Take 1 tablet (325 mg) by mouth 2 times daily  9/13/2018 at Unknown time Yes Trevon Henson MD   furosemide (LASIX) 20 MG tablet Take 1 tablet (20 mg) by mouth daily 9/13/2018 at Unknown time Yes Beatriz Tanner MD   gabapentin (NEURONTIN) 100 MG capsule Take 1 capsule (100 mg) by mouth At Bedtime 9/13/2018 at Unknown time Yes Devendra Almanzar MD   hydrOXYzine (ATARAX) 25 MG tablet Take 1 tablet (25 mg) by mouth 2 times daily 9/13/2018 at Unknown time Yes King Louis MD   lactulose (CHRONULAC) 10 GM/15ML solution Take 30 mLs (20 g) by mouth 3 times daily 9/13/2018 at Unknown time Yes Maribell Bolden MD   levothyroxine (SYNTHROID/LEVOTHROID) 88 MCG tablet Take 1 tablet (88 mcg) by mouth daily 9/13/2018 at Unknown time Yes King Louis MD   methylphenidate (RITALIN) 10 MG tablet Take 1 tablet (10 mg) by mouth 2 times daily 9/13/2018 at Unknown time Yes Leroy Alcaraz MD   mirtazapine (REMERON) 45 MG tablet Take 1 tablet (45 mg) by mouth At Bedtime 9/13/2018 at Unknown time Yes Leroy Alcaraz MD   multivitamin, therapeutic with minerals (THERA-VIT-M) TABS Take 1 tablet by mouth 2 times daily 9/13/2018 at Unknown time Yes Reported, Patient   omeprazole (PRILOSEC) 20 MG CR capsule Take 2 capsules (40 mg) by mouth 2 times daily 9/13/2018 at Unknown time Yes King Louis MD   phenytoin (DILANTIN) 30 MG CR capsule Take 4 capsules (120 mg) by mouth 2 times daily 9/13/2018 at PM Yes Anil English MD   pravastatin (PRAVACHOL) 80 MG tablet Take 1 tablet (80 mg) by mouth daily 9/13/2018 at Unknown time Yes King Louis MD   spironolactone (ALDACTONE) 25 MG tablet Take 2 tablets (50 mg) by mouth daily 9/13/2018 at Unknown time Yes Beatriz Tanner MD   thiamine (VITAMIN B-1) 100 MG tablet Take 1 tablet (100 mg) by mouth daily 9/13/2018 at Unknown time Yes King Louis MD   traZODone (DESYREL) 50 MG tablet Take 1 tablet (50 mg) by mouth nightly as needed for sleep 9/13/2018 at Unknown time Yes Kieran  MD Leroy   Vitamin D, Cholecalciferol, 1000 units TABS Take 1 tablet by mouth daily 9/13/2018 at Unknown time Yes Unknown, Entered By History   XIFAXAN 550 MG TABS tablet TAKE ONE TABLET BY MOUTH TWICE DAILY 9/13/2018 at Unknown time Yes Beatriz Tanner MD   blood glucose monitoring (ONETOUCH ULTRA) test strip Use to test blood sugars 4 times daily as needed or as directed.   King Louis MD   CANE, ANY MATERIAL One cane   King Louis MD   HYDROcodone-acetaminophen (NORCO) 5-325 MG per tablet Take 2 tablets by mouth every 6 hours as needed for moderate to severe pain More than a month at Unknown time  King Louis MD   lidocaine (LMX4) 4 % CREA cream Apply topically once as needed for mild pain More than a month at Unknown time  King Louis MD   ONE TOUCH LANCETS MISC by In Vitro route 4 times daily as needed   King Louis MD       Medication history completed by: Sheela Harrison, PharmD

## 2018-09-14 NOTE — H&P
"[facility], [place]    Internal Medicine History and Physical - [facility] Service       Date of Admission:  9/14/2018    Chief Complaint   Weakness, confusion    History is obtained from the patient and wife     History of Present Illness   Mono Varghese is a 47 year old male with with history of DM2, thrombocytopenia, cirrhosis c/b refractory ascites, HE, and rectal/esophageal varices, portal vein thrombosis not on anticoagulation who presented with two days of generalized weakness, confusion, and aphasia. His wife noticed yesterday morning that he was unable to get out of bed and needed someone to help him to the bathroom which is unusual. He also told her to shut the door when he meant \"turn off the light\", pointing to the light switch; he also has had word finding difficulties. His wife notes that he has fallen multiple times since yesterday, sometimes hitting his head. She notes he has been colder, requiring more blankets, denied fevers. He has had a cough (sometimes productive) over the past several days (denied other URI sxs). Denied urinary sxs. The patient has had Parkinsonian tremors for the last couple of weeks (had an apt w/ neurology which they missed because of being hospitalized early this month). No witnessed seizures. The patient has been having at least four BMs/day per the wife (only lactulose dose he missed was yesterday).     Of note, the patient was admitted from 9/3-9/6 for BRBPR, thought to be 2/2 rectal hemorrhages/varies. The patient was to f/u w/ GI as an outpatient for possible banding/coiling. He completed a 5 day course of cipro for SBP ppx given his ascites.     In the ED, he HD stable and AF. Stoke code was called and patient received CT head and neck w/ and w/o con which did not show hemorrhage or aneurysm. Labs were significant for an ammonia level of 67.     Review of Systems   The 10 point Review of Systems is negative other than noted in the " HPI.    Past Medical History    I have reviewed this patient's medical history and updated it with pertinent information if needed.   Past Medical History:   Diagnosis Date     ADHD      Alcoholic cirrhosis of liver with ascites (H) 06/17/2015    cirrhosis secondary to alcohol, complicated by variceal bleeding and hepatic encephalopathy      Ascites due to alcoholic cirrhosis (H)     Requiring regular paracentesis.     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 status post subtotal resection by Dr. J Carlos Aranda in early 06/2013. Negative for 1p/19q deletions. S/P Concurrent chemoradiation July 15 through August 23, 2013. Initiated adjuvant oral temozolomide 9/17/13; switch     H/O LENA (obstructive sleep apnea)-moderate 12/18/2012    Pt states this is not currently an issue.     Hyperlipidemia LDL goal <100 8/1/2016     Hypothyroidism 8/1/2016     Liver failure (H)      Moderate major depression (H) 10/3/2012     Obesity      Pancytopenia (H) 8/1/2016    Chronic pancytopenia secondary to liver disease since at least 2012      Partial epilepsy with impairment of consciousness (H) 05/07/2014     Portal hypertensive gastropathy      Portal vein thrombosis 12/18/2015    history of left lower extremity deep vein thrombosis as well  as portal vein and superior mesenteric vein thrombosis, late 2015 when he was hospitalized in the ICU and seriously ill temporary IVC filter placed in 12/2015 when he was in the ICU with deep vein thromboses and active bleeding      Pulmonary nodules 6/17/2015     Rectal bleeding      Type 2 diabetes mellitus (H) 10/3/2012        Past Surgical History   I have reviewed this patient's surgical history and updated it with pertinent information if needed.  Past Surgical History:   Procedure Laterality Date     COLONOSCOPY N/A 11/27/2015    Procedure: COMBINED COLONOSCOPY, SINGLE OR MULTIPLE BIOPSY/POLYPECTOMY BY BIOPSY;  Surgeon: Ran Thurston MD;  Location: [facility]     ESOPHAGOSCOPY, GASTROSCOPY, DUODENOSCOPY (EGD), COMBINED N/A 11/23/2015    Procedure: COMBINED ESOPHAGOSCOPY, GASTROSCOPY, DUODENOSCOPY (EGD);  Surgeon: Rocael Rizvi MD;  Location: [facility]     ESOPHAGOSCOPY, GASTROSCOPY, DUODENOSCOPY (EGD), COMBINED N/A 1/25/2017    Procedure: COMBINED ESOPHAGOSCOPY, GASTROSCOPY, DUODENOSCOPY (EGD), BIOPSY SINGLE OR MULTIPLE;  Surgeon: Rocael Tejada MD;  Location: [facility]     ESOPHAGOSCOPY, GASTROSCOPY, DUODENOSCOPY (EGD), COMBINED N/A 3/30/2017    Procedure: COMBINED ESOPHAGOSCOPY, GASTROSCOPY, DUODENOSCOPY (EGD);  Surgeon: Jordana Ramirez MD;  Location: [facility]     ESOPHAGOSCOPY, GASTROSCOPY, DUODENOSCOPY (EGD), COMBINED N/A 1/19/2018    Procedure: COMBINED ESOPHAGOSCOPY, GASTROSCOPY, DUODENOSCOPY (EGD);  Upper Endoscopy with verceal banding; Flexible Sigmoidoscopy;  Surgeon: Guru Jose Kelly MD;  Location: [facility]     ESOPHAGOSCOPY, GASTROSCOPY, DUODENOSCOPY (EGD), COMBINED N/A 2/12/2018    Procedure: COMBINED ESOPHAGOSCOPY, GASTROSCOPY, DUODENOSCOPY (EGD);  Esophagogastroduodenoscopy with variceal banding;  Surgeon: Guru Jsoe Kelly MD;  Location: [facility]     ESOPHAGOSCOPY, GASTROSCOPY, DUODENOSCOPY (EGD), COMBINED N/A 3/5/2018    Procedure: COMBINED  ESOPHAGOSCOPY, GASTROSCOPY, DUODENOSCOPY (EGD);  Esophagogastroduodenoscopy  with banding of varices Latex Allergy ;  Surgeon: Guru Jose Kelly MD;  Location:  OR     ESOPHAGOSCOPY, GASTROSCOPY, DUODENOSCOPY (EGD), COMBINED N/A 4/16/2018    Procedure: COMBINED ESOPHAGOSCOPY, GASTROSCOPY, DUODENOSCOPY (EGD);  Upper Endoscopy  with esophageal varices banding; Latex Allergy ;  Surgeon: Guru Jose Kelly MD;  Location:  OR     ESOPHAGOSCOPY, GASTROSCOPY, DUODENOSCOPY (EGD), COMBINED N/A 5/30/2018    Procedure: COMBINED ESOPHAGOSCOPY, GASTROSCOPY, DUODENOSCOPY (EGD);  upper endoscopy with banding of esophageal varices. *latex Allergy*;  Surgeon: Guru Jose Kelly MD;  Location:  OR     OPTICAL TRACKING SYSTEM CRANIOTOMY, EXCISE TUMOR, COMBINED  6/6/2013    Procedure: COMBINED OPTICAL TRACKING SYSTEM CRANIOTOMY, EXCISE TUMOR;  Left Stealth Guided Craniotomy , Tumor Resection ;  Surgeon: J Carlos Aranda MD;  Location:  OR     ORTHOPEDIC SURGERY      hand surgery- right     SIGMOIDOSCOPY FLEXIBLE N/A 11/24/2015    Procedure: SIGMOIDOSCOPY FLEXIBLE;  Surgeon: Rocael Rizvi MD;  Location:  GI     SIGMOIDOSCOPY FLEXIBLE N/A 1/19/2018    Procedure: SIGMOIDOSCOPY FLEXIBLE;;  Surgeon: Guru Jose Kelly MD;  Location:  OR        Social History   Social History   Substance Use Topics     Smoking status: Never Smoker     Smokeless tobacco: Never Used     Alcohol use No      Comment: Quit 01/2014       Family History   I have reviewed this patient's family history and updated it with pertinent information if needed.   Family History   Problem Relation Age of Onset     Adopted: Yes     Medical History Unknown Mother      Cirrhosis Father      Medical History Unknown Brother      Medical History Unknown Brother      Medical History Unknown Brother        Prior to Admission Medications   Prior to Admission Medications   Prescriptions Last  Dose Informant Patient Reported? Taking?   CANE, ANY MATERIAL   No No   Sig: One cane   Calcium Carb-Cholecalciferol (CALCIUM 500 +D) 500-400 MG-UNIT TABS 9/13/2018 at Unknown time  No Yes   Sig: Take 500 mg by mouth daily   HYDROcodone-acetaminophen (NORCO) 5-325 MG per tablet More than a month at Unknown time  No No   Sig: Take 2 tablets by mouth every 6 hours as needed for moderate to severe pain   ONE TOUCH LANCETS MISC   No No   Sig: by In Vitro route 4 times daily as needed   Vitamin D, Cholecalciferol, 1000 units TABS 9/13/2018 at Unknown time  Yes Yes   Sig: Take 1 tablet by mouth daily   XIFAXAN 550 MG TABS tablet 9/13/2018 at Unknown time  No Yes   Sig: TAKE ONE TABLET BY MOUTH TWICE DAILY   blood glucose monitoring (ONETOUCH ULTRA) test strip   No No   Sig: Use to test blood sugars 4 times daily as needed or as directed.   ciclopirox (LOPROX) 0.77 % cream 9/13/2018 at Unknown time  No Yes   Sig: Apply topically 2 times daily To feet and toenails.   cyanocobalamin 1000 MCG TABS 9/13/2018 at Unknown time  Yes Yes   Sig: Take 1 tablet by mouth daily   dulaglutide (TRULICITY) 1.5 MG/0.5ML pen Past Month at Unknown time  No Yes   Sig: Inject 1.5 mg Subcutaneous every 7 days   ferrous sulfate (IRON) 325 (65 FE) MG tablet 9/13/2018 at Unknown time  No Yes   Sig: Take 1 tablet (325 mg) by mouth 2 times daily   furosemide (LASIX) 20 MG tablet 9/13/2018 at Unknown time  No Yes   Sig: Take 1 tablet (20 mg) by mouth daily   gabapentin (NEURONTIN) 100 MG capsule 9/13/2018 at Unknown time  No Yes   Sig: Take 1 capsule (100 mg) by mouth At Bedtime   hydrOXYzine (ATARAX) 25 MG tablet 9/13/2018 at Unknown time  No Yes   Sig: Take 1 tablet (25 mg) by mouth 2 times daily   lactulose (CHRONULAC) 10 GM/15ML solution 9/13/2018 at Unknown time  No Yes   Sig: Take 30 mLs (20 g) by mouth 3 times daily   levothyroxine (SYNTHROID/LEVOTHROID) 88 MCG tablet 9/13/2018 at Unknown time  No Yes   Sig: Take 1 tablet (88 mcg) by mouth  daily   lidocaine (LMX4) 4 % CREA cream More than a month at Unknown time  No No   Sig: Apply topically once as needed for mild pain   methylphenidate (RITALIN) 10 MG tablet 9/13/2018 at Unknown time  No Yes   Sig: Take 1 tablet (10 mg) by mouth 2 times daily   mirtazapine (REMERON) 45 MG tablet 9/13/2018 at Unknown time  No Yes   Sig: Take 1 tablet (45 mg) by mouth At Bedtime   multivitamin, therapeutic with minerals (THERA-VIT-M) TABS 9/13/2018 at Unknown time Self Yes Yes   Sig: Take 1 tablet by mouth 2 times daily   omeprazole (PRILOSEC) 20 MG CR capsule   No No   Sig: Take 2 capsules (40 mg) by mouth 2 times daily   phenytoin (DILANTIN) 30 MG CR capsule 9/13/2018 at Unknown time  No Yes   Sig: Take 4 capsules (120 mg) by mouth 2 times daily   pravastatin (PRAVACHOL) 80 MG tablet 9/13/2018 at Unknown time  No Yes   Sig: Take 1 tablet (80 mg) by mouth daily   spironolactone (ALDACTONE) 25 MG tablet 9/13/2018 at Unknown time  No Yes   Sig: Take 2 tablets (50 mg) by mouth daily   thiamine (VITAMIN B-1) 100 MG tablet 9/13/2018 at Unknown time  No Yes   Sig: Take 1 tablet (100 mg) by mouth daily   traZODone (DESYREL) 50 MG tablet   No No   Sig: Take 1 tablet (50 mg) by mouth nightly as needed for sleep      Facility-Administered Medications: None     Allergies   Allergies   Allergen Reactions     Latex Itching and Rash     No Clinical Screening - See Comments      Coban and Surgilast cause itching     Tegaderm Transparent Dressing (Informational Only)        Physical Exam   Vital Signs: Temp: 98.2  F (36.8  C) Temp src: Oral BP: 125/81 Pulse: 72 Heart Rate: 71 Resp: 12 SpO2: 98 % O2 Device: None (Room air)    Weight: 173 lbs 0 oz    General Appearance: NAD, on RA; wife at bedside  Respiratory: CTA  Cardiovascular: RRR, no m/r/g  GI: soft, distended, non tender; POC US revealed ascites   Neurologic: oriented only to person; repetitive motions of the limbs; asterixis; moving all extremities equally     Assessment &  Plan   Mono Varghese is a 47 year old male with with history of DM2, thrombocytopenia, cirrhosis c/b refractory ascites, HE, and rectal/esophageal varices, portal vein thrombosis not on anticoagulation who presented with two days of generalized weakness, confusion, and aphasia.     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    Patient's wife reports acute weakness and word finding difficulties; CT head and neck negative for hemorrhage or aneurysms; no signs to suggest meningitis; patient does endorse cough; c/f possibility of seizures given patient's hx of brain cancer and surgery; patient does have asterixis on exam so HE may be contributing;  - [employer] protocol, goal 3-4 BMs every day  - Neurology consulted, recs appreciated   - Diagnotic/therapeuic paracentesis tomorrow to r/o SBP (low suspician)  -  CXR   - Continue phenytoin     #EtOH cirrhosis c/b rectal and esophageal varices, refractory ascites, portal vein thromboses, and HE    Most recent EGD with grade II EVs s/p 2 bands on 7/11; 9L ascitic fluid removed on 9/11   - Plan on on discharge earlier this month was for patient to f/u w/ GI for possible coiling of rectal varices   - Due for repeat endoscopy this month   - Lactulose as above, cont rifaximin  - Hold Lasix 20mg every day and spironolactone 50mg every day for now, will consider restarting tomorrow   - Follows w/ GI as outpatient for HCC screening   - Cont omeprazole 40mg BID     #Pancytopenia   Patient's Hgb improved since discharge earlier this month; transferrin low at 149 on 9/5; zinc also low at 43; copper, B12, folate wnl; likely 2/2 liver dx vs malnutrition)  - Cont Fe supplementaiton  - Consider zinc supplement tomorrow     Chronic:  # Endo: hypothyroid on synthroid  # Depression/insomnia: continue home mirtazapine 45mg QPM, gabapentin 100mg QPM, Ritalin 10mg BID, Atarax 25mg BID     Diet: Regular   Fluids: none  Lines: PIV   Munoz  Catheter: not present    DVT Prophylaxis: PCDs, [place] 3  Code Status: Prior  Disposition Plan   Expected discharge: home pending w/u and improvement in sxs (PT/OT consulted to prevent deconditioning while hospitalized)     Entered: Maribell Bolden MD 09/14/2018, 3:24 PM   Information in the above section will display in the discharge planner report.       The patient was discussed with Dr. Clair Bolden MD  [address]   Pager: [phone]  Please see sticky note for cross cover information      Data   Reviewed in Epic

## 2018-09-14 NOTE — CONSULTS
[facility]  Neurology  Consultation    Patient Name:  Mono Varghese  MRN:  [id number]    :  1970  Date of Service:  2018  Primary care provider:  [place]      Neurology consultation service was asked to see Mono Varghese by Dr. Self to evaluate for aphasia and encephalopathy.    History of Present Illness:   47 year old male h/o oligoastrocytoma s/p left parietal lobe resection, alcoholic cirrhosis with ascites, and cognitive impairment (noted in neurology visit 2015) who presents with expressive aphasia.  According to his wife Mr. Varghese has been having difficulty expressing himself over the past several days including forgetting the name of their dog.  She also notes general deconditioning over the past several weeks including now using a walker to ambulate, and having many falls recently including head injuries.  She notes that he appeared for a neuropsychological assessment within the past 2-3 weeks and was turned away early since he was not able to participate in the examination- this was a follow-up exam and he was able to complete one roughly a year ago.  In terms of his cirrhosis he gets paracentesis every 2 weeks and typically 7-9 L are drained.  He has been having frequent bowel movements, at least 3-4 each day since his last admission here.  He was discharged 5 days ago for encephalopathy with an ammonia level of 131, as well as GI bleeding attributed to rectal varices.    He has been noted to have expressive aphasia per chart review since at least 2014.  He has had seizures in the past and is followed here at the [facility], initially on Keppra (stopped for unclear reasons)then switched to Depakote though the patient stopped taking it since the prescription was prohibitively expensive.  Eventually he had persistent thrombocytopenia so Depakote was discontinued and he was then started on Dilantin in .  He has  continued on Dilantin since then for cost reasons as well as side effect profile, though Dr. knox acknowledges that his liver disease makes these levels difficult to control.    He has had 2 seizures semiology's: GTC and staring spells.  Has also noted loss of bowel control upon awakening in the morning.  His wife notes no recent GTC, though she has seen spell staring spells with some frequency.    ROS  A 10-point ROS was performed as per HPI. Pertinent negatives and positives are included above.    PMH  Past Medical History:   Diagnosis Date     ADHD      Alcoholic cirrhosis of liver with ascites (H) 06/17/2015    cirrhosis secondary to alcohol, complicated by variceal bleeding and hepatic encephalopathy      Ascites due to alcoholic cirrhosis (H)     Requiring regular paracentesis.     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 status post subtotal resection by Dr. J Carlos Aranda in early 06/2013. Negative for 1p/19q deletions. S/P Concurrent chemoradiation July 15 through August 23, 2013. Initiated adjuvant oral temozolomide 9/17/13; switch     H/O LENA (obstructive sleep apnea)-moderate 12/18/2012    Pt states this is not currently an issue.     Hyperlipidemia LDL goal <100 8/1/2016     Hypothyroidism 8/1/2016     Liver  failure (H)      Moderate major depression (H) 10/3/2012     Obesity      Pancytopenia (H) 8/1/2016    Chronic pancytopenia secondary to liver disease since at least 2012      Partial epilepsy with impairment of consciousness (H) 05/07/2014     Portal hypertensive gastropathy      Portal vein thrombosis 12/18/2015    history of left lower extremity deep vein thrombosis as well as portal vein and superior mesenteric vein thrombosis, late 2015 when he was hospitalized in the ICU and seriously ill temporary IVC filter placed in 12/2015 when he was in the ICU with deep vein thromboses and active bleeding      Pulmonary nodules 6/17/2015     Rectal bleeding      Type 2 diabetes mellitus (H) 10/3/2012     Past Surgical History:   Procedure Laterality Date     COLONOSCOPY N/A 11/27/2015    Procedure: COMBINED COLONOSCOPY, SINGLE OR MULTIPLE BIOPSY/POLYPECTOMY BY BIOPSY;  Surgeon: Ran Thurston MD;  Location: [facility]     ESOPHAGOSCOPY, GASTROSCOPY, DUODENOSCOPY (EGD), COMBINED N/A 11/23/2015    Procedure: COMBINED ESOPHAGOSCOPY, GASTROSCOPY, DUODENOSCOPY (EGD);  Surgeon: Rocael Rizvi MD;  Location: [facility]     ESOPHAGOSCOPY, GASTROSCOPY, DUODENOSCOPY (EGD), COMBINED N/A 1/25/2017    Procedure: COMBINED ESOPHAGOSCOPY, GASTROSCOPY, DUODENOSCOPY (EGD), BIOPSY SINGLE OR MULTIPLE;  Surgeon: Rocael Tejada MD;  Location: [facility]     ESOPHAGOSCOPY, GASTROSCOPY, DUODENOSCOPY (EGD), COMBINED N/A 3/30/2017    Procedure: COMBINED ESOPHAGOSCOPY, GASTROSCOPY, DUODENOSCOPY (EGD);  Surgeon: Jordana Ramirez MD;  Location: [facility]     ESOPHAGOSCOPY, GASTROSCOPY, DUODENOSCOPY (EGD), COMBINED N/A 1/19/2018    Procedure: COMBINED ESOPHAGOSCOPY, GASTROSCOPY, DUODENOSCOPY (EGD);  Upper Endoscopy with verceal banding; Flexible Sigmoidoscopy;  Surgeon: Guru Jose Kelly MD;  Location: [facility]     ESOPHAGOSCOPY, GASTROSCOPY, DUODENOSCOPY (EGD), COMBINED N/A 2/12/2018    Procedure: COMBINED ESOPHAGOSCOPY,  GASTROSCOPY, DUODENOSCOPY (EGD);  Esophagogastroduodenoscopy with variceal banding;  Surgeon: Guru Jose Kelly MD;  Location: [facility]     ESOPHAGOSCOPY, GASTROSCOPY, DUODENOSCOPY (EGD), COMBINED N/A 3/5/2018    Procedure: COMBINED ESOPHAGOSCOPY, GASTROSCOPY, DUODENOSCOPY (EGD);  Esophagogastroduodenoscopy  with banding of varices Latex Allergy ;  Surgeon: Guru Jose Kelly MD;  Location: [facility]     ESOPHAGOSCOPY, GASTROSCOPY, DUODENOSCOPY (EGD), COMBINED N/A 4/16/2018    Procedure: COMBINED ESOPHAGOSCOPY, GASTROSCOPY, DUODENOSCOPY (EGD);  Upper Endoscopy  with esophageal varices banding; Latex Allergy ;  Surgeon: Guru Jose Kelly MD;  [facility]     ESOPHAGOSCOPY, GASTROSCOPY, DUODENOSCOPY (EGD), COMBINED N/A 5/30/2018    Procedure: COMBINED ESOPHAGOSCOPY, GASTROSCOPY, DUODENOSCOPY (EGD);  upper endoscopy with banding of esophageal varices. *latex Allergy*;  Surgeon: Guru Jose Kelly MD;  Location:  OR     OPTICAL TRACKING SYSTEM CRANIOTOMY, EXCISE TUMOR, COMBINED  6/6/2013    Procedure: COMBINED OPTICAL TRACKING SYSTEM CRANIOTOMY, EXCISE TUMOR;  Left Stealth Guided Craniotomy , Tumor Resection ;  Surgeon: J Carlos Aranda MD;  Location:  OR     ORTHOPEDIC SURGERY      hand surgery- right     SIGMOIDOSCOPY FLEXIBLE N/A 11/24/2015    Procedure: SIGMOIDOSCOPY FLEXIBLE;  Surgeon: Rocael Rizvi MD;  Location:  GI     SIGMOIDOSCOPY FLEXIBLE N/A 1/19/2018    Procedure: SIGMOIDOSCOPY FLEXIBLE;;  Surgeon: Guru Jose Kelly MD;  Location:  OR       Medications   Prescriptions Prior to Admission   Medication Sig Dispense Refill Last Dose     Calcium Carb-Cholecalciferol (CALCIUM 500 +D) 500-400 MG-UNIT TABS Take 500 mg by mouth daily 90 tablet 1 9/13/2018 at Unknown time     ciclopirox (LOPROX) 0.77 % cream Apply topically 2 times daily To feet and toenails. 90 g 4 9/13/2018 at Unknown time     cyanocobalamin 1000  MCG TABS Take 1 tablet by mouth daily   9/13/2018 at Unknown time     dulaglutide (TRULICITY) 1.5 MG/0.5ML pen Inject 1.5 mg Subcutaneous every 7 days 6 mL 1 Past Month at Unknown time     ferrous sulfate (IRON) 325 (65 FE) MG tablet Take 1 tablet (325 mg) by mouth 2 times daily 200 tablet 3 9/13/2018 at Unknown time     furosemide (LASIX) 20 MG tablet Take 1 tablet (20 mg) by mouth daily 30 tablet 3 9/13/2018 at Unknown time     gabapentin (NEURONTIN) 100 MG capsule Take 1 capsule (100 mg) by mouth At Bedtime 30 capsule 3 9/13/2018 at Unknown time     hydrOXYzine (ATARAX) 25 MG tablet Take 1 tablet (25 mg) by mouth 2 times daily 180 tablet 1 9/13/2018 at Unknown time     lactulose (CHRONULAC) 10 GM/15ML solution Take 30 mLs (20 g) by mouth 3 times daily 946 mL 3 9/13/2018 at Unknown time     levothyroxine (SYNTHROID/LEVOTHROID) 88 MCG tablet Take 1 tablet (88 mcg) by mouth daily 90 tablet 1 9/13/2018 at Unknown time     methylphenidate (RITALIN) 10 MG tablet Take 1 tablet (10 mg) by mouth 2 times daily 60 tablet 0 9/13/2018 at Unknown time     mirtazapine (REMERON) 45 MG tablet Take 1 tablet (45 mg) by mouth At Bedtime 30 tablet 2 9/13/2018 at Unknown time     multivitamin, therapeutic with minerals (THERA-VIT-M) TABS Take 1 tablet by mouth 2 times daily   9/13/2018 at Unknown time     omeprazole (PRILOSEC) 20 MG CR capsule Take 2 capsules (40 mg) by mouth 2 times daily 360 capsule 3 9/13/2018 at Unknown time     phenytoin (DILANTIN) 30 MG CR capsule Take 4 capsules (120 mg) by mouth 2 times daily 720 capsule 11 9/13/2018 at PM     pravastatin (PRAVACHOL) 80 MG tablet Take 1 tablet (80 mg) by mouth daily 90 tablet 3 9/13/2018 at Unknown time     spironolactone (ALDACTONE) 25 MG tablet Take 2 tablets (50 mg) by mouth daily 180 tablet 1 9/13/2018 at Unknown time     thiamine (VITAMIN B-1) 100 MG tablet Take 1 tablet (100 mg) by mouth daily 100 tablet 1 9/13/2018 at Unknown time     traZODone (DESYREL) 50 MG tablet  "Take 1 tablet (50 mg) by mouth nightly as needed for sleep 30 tablet 2 9/13/2018 at Unknown time     Vitamin D, Cholecalciferol, 1000 units TABS Take 1 tablet by mouth daily   9/13/2018 at Unknown time     XIFAXAN 550 MG TABS tablet TAKE ONE TABLET BY MOUTH TWICE DAILY 60 tablet 11 9/13/2018 at Unknown time     blood glucose monitoring (ONETOUCH ULTRA) test strip Use to test blood sugars 4 times daily as needed or as directed. 400 each 1 Taking     CANE, ANY MATERIAL One cane 1 each 0 Taking     HYDROcodone-acetaminophen (NORCO) 5-325 MG per tablet Take 2 tablets by mouth every 6 hours as needed for moderate to severe pain 60 tablet 0 More than a month at Unknown time     lidocaine (LMX4) 4 % CREA cream Apply topically once as needed for mild pain 133 g 1 More than a month at Unknown time     ONE TOUCH LANCETS MISC by In Vitro route 4 times daily as needed 100 each prn Taking       Allergies  Allergies   Allergen Reactions     Latex Itching and Rash     No Clinical Screening - See Comments      Coban and Surgilast cause itching     Tegaderm Transparent Dressing (Informational Only)        Social History  Social History   Substance Use Topics     Smoking status: Never Smoker     Smokeless tobacco: Never Used     Alcohol use No      Comment: Quit 01/2014       Family History    Family History   Problem Relation Age of Onset     Adopted: Yes     Medical History Unknown Mother      Cirrhosis Father      Medical History Unknown Brother      Medical History Unknown Brother      Medical History Unknown Brother          Physical Examination   Vitals: /71 (BP Location: Left arm)  Pulse 71  Temp 97.7  F (36.5  C) (Oral)  Resp 18  Ht 1.778 m (5' 10\")  Wt 78.5 kg (173 lb)  SpO2 94%  BMI 24.82 kg/m2  General: Adult, in NAD, cooperative  HEENT: NC/AT, no icterus, op pink and moist  Cardiac: RRR no M  Chest: CTAB no w/c/r  Abdomen: S/NT/ND  Extremities: No LE swelling.  Skin: No rash or lesion.   Psych: Mood pleasant, " affect congruent  Neuro:  Mental status: Awake, alert, eyes open and tracking conversation between examiner and wife.  Making jokes to wife.  Patient is slow to respond, and demonstrates apraxia with simple tasks especially worse with right hand.  Patient makes perseverative errors as well.  Cranial nerves:  Eyes conjugate, PERRLA, EOMI, face symmetric, facial sensation intact, shoulder shrug strong, tongue/uvula midline.  Appears to be right homonymous hemianopia when testing confrontation.  Motor: Tone within normal. 5/5 strength in all 4 extremities.  Asterixis present in both upper extremities.  Reflexes: Normoreflexic in upper and lower extremities, right upper and lower brisk relative to left.  Toes down-going.  Sensory: Intact to LT, PP  Coordination: FNF no dysmetria, HTS & LAZ normal  Gait: Unable to assess.  (Patient has been using a walker over the past several weeks)      Investigations   9/14/18 CTA Head Neck   Impression:    1. Head CTA demonstrates no aneurysm or stenosis of the major  intracranial arteries.   2. Neck CTA demonstrates 10-20% diameter short segment stenosis of the  proximal right ICA. No left ICA or vertebral artery stenosis. No  evidence for arterial dissection.  3. No evidence of intracranial hemorrhage on the noncontrast head CT.      4. Unchanged appearance of the left parietal tumor resection cavity.    Impression  #Prior left parietal lobe resection  #Expressive aphasia  #Seizure disorder  #Alcoholic cirrhosis with ascites    Recommendations  Mr. Varghese presents for evaluation of worsening aphasia in the setting of known left parietal lobe resection along with cirrhosis as well as a seizure disorder.  While this expressive aphasia was initially described as acute onset which warranted an earlier stroke code, further discussion with the patient's wife describes aphasia evolving over a matter of days.  And chart review reveals expressive aphasia dating back to 2015, which  corresponds to his left parietal lobe resection which would expect to cause some degree of aphasia.  His wife also describes general deconditioning including now using a walker to ambulate, and he was turned away from repeat neuropsychological testing earlier in the month since he did not have the capacity to participate.  Given this time course over days to weeks, his worsening level of function may be attributed to metabolic encephalopathy, or an infection leading to recrudescence of his old neurologic injury.  His seizure disorder also appears to be controlled on phenytoin though he is chronically in the lower therapeutic range or below.  Since his prior post ictal states also described expressive aphasia it is reasonable to monitor him on EEG to assess for subclinical seizures that may warrant changing his current AED regimen.  His current deficits appear to localize to the area of his prior resection including expressive aphasia as well as apraxia, which makes a recrudescence secondary to another process possible.  In terms of his ammonia level this appears to be close to his baseline in the range of 60.    - Video EEG overnight  - Continue to manage possible metabolic causes of encephalopathy/ recrudescence ( elevated ammonia; possible infectious causes like pneumonia, SBP or UTI; hypothyroidism)  - Thiamine 500mg IV given alcohol history- can repeat for 3 days if suspicion high  - Continue current phenytoin dose, will consider modifying anti-epileptic drugs based on EEG results     Thank you for involving Neurology in the care of Mono Varghese.  Please do not hesitate to call with questions/concerns (consult pager [phone]).      Patient was seen and discussed with Dr. Red.    Checo Mena MD  Neurology Resident PGY-2     Attending physician: Dr. Self of internal medicine requested neurologic consultation for encephalopathy.    I saw and evaluated the patient on 9/15/2018 with the resident team  and I agree with the findings and plan of care as per. Dr. Mena above, with the following additions.     The patient is a 47 year old male with history of astrocytoma s/p resection with seizures and hepatic encephalopathy who presents confused.  Per report of wife this is far from baseline but similar to other presentations for hepatic encephalopathy.  He did have asterixis last night, but not much on my exam today.  R hononymous hemianopia on exam.  Also with difficulties naming president, stating date, following complex multistep commands.  Can do simple commands, name appropriately and oriented to place.  Able to do very simple calculations but nothing more complex.   Given seizure history we will assess EEG overnight.  If EEG unremarkable for seizures will discontinue today.  With liver failure will check free phenytoin which is more accurate than total levels to assess for toxicity which could contribute to symptoms although no nystagmus seen.   Given that is ammonia is similar to some baseline levels if does not improve with treatment of his hepatic issues would order MRI brain with and without contrast to look for CNS origin of encephalopathy.  With significant weight loss we will check some nutritional labs as well and replace thiamine.        Luis Red, DO   of Neurology

## 2018-09-14 NOTE — ED PROVIDER NOTES
[place] EMERGENCY DEPARTMENT ([facility])  9/14/18   History     Chief Complaint   Patient presents with     Altered Mental Status     Fall     The history is provided by the spouse and medical records. The history is limited by the condition of the patient (AMS).     Mono Varghese is a 47 year old male with a medical history significant for alcoholic cirrhosis of liver with ascites, hepatic encephalopathy, type 2 diabetes mellitus, thrombocytopenia, rectal/esophageal varices and portal vein thrombosis not currently on anticoagulation.  Per review of patient's chart, patient was recently hospitalized here from 9/3/2018 to 9/6/2018 after presenting with 3 days of multiple episodes of bright red blood per rectum.  Patient was seen by GI and did not feel that the source was upper, colorectal surgery saw the patient and determined that surgical intervention was not indicated given the patient's portal hypertension.  Patient was instructed to follow-up with GI as an outpatient for possible TIPs versus coiling of rectal varices.    Patient presents to the Emergency Department today for evaluation of altered mental status and fall.  The patient's wife provided most of the history as the patient is altered and confused.  The wife reports that the patient is generally weak at baseline; however, she noticed an increase of this weakness yesterday and this morning the patient woke up with right-sided weakness.  The wife reports that the weakness was in the right leg and right arm, she did not notice any weakness in his face.  The patient got up from bed and was veering off to the right, attempted to take 2 steps; however, was apparently not able to lift his right foot from the ground and fell.  The patient did strike his head during the fall.  The patient is also altered here in the Emergency Department.  The patient reports that he does feel weak, but is not able to tell us what side it is on.  The patient is  having word finding difficulties.  The wife does note that the patient does have a history of confusion when his ammonia levels are elevated.    I have reviewed the Medications, Allergies, Past Medical and Surgical History, and Social History in the [employer] system.    Past Medical History:   Diagnosis Date     ADHD      Alcoholic cirrhosis of liver with ascites (H) 06/17/2015    cirrhosis secondary to alcohol, complicated by variceal bleeding and hepatic encephalopathy      Ascites due to alcoholic cirrhosis (H)     Requiring regular paracentesis.     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 status post subtotal resection by Dr. J Carlos Aranda in early 06/2013. Negative for 1p/19q deletions. S/P Concurrent chemoradiation July 15 through August 23, 2013. Initiated adjuvant oral temozolomide 9/17/13; switch     H/O LENA (obstructive sleep apnea)-moderate 12/18/2012    Pt states this is not currently an issue.     Hyperlipidemia LDL goal <100 8/1/2016     Hypothyroidism 8/1/2016     Liver failure (H)      Moderate major depression (H) 10/3/2012     Obesity      Pancytopenia (H) 8/1/2016    Chronic pancytopenia secondary to liver disease since at least 2012      Partial epilepsy with impairment of consciousness (H) 05/07/2014      Portal hypertensive gastropathy      Portal vein thrombosis 12/18/2015    history of left lower extremity deep vein thrombosis as well as portal vein and superior mesenteric vein thrombosis, late 2015 when he was hospitalized in the ICU and seriously ill temporary IVC filter placed in 12/2015 when he was in the ICU with deep vein thromboses and active bleeding      Pulmonary nodules 6/17/2015     Rectal bleeding      Type 2 diabetes mellitus (H) 10/3/2012       Past Surgical History:   Procedure Laterality Date     COLONOSCOPY N/A 11/27/2015    Procedure: COMBINED COLONOSCOPY, SINGLE OR MULTIPLE BIOPSY/POLYPECTOMY BY BIOPSY;  Surgeon: Ran Thurston MD;  Location: [facility]     ESOPHAGOSCOPY, GASTROSCOPY, DUODENOSCOPY (EGD), COMBINED N/A 11/23/2015    Procedure: COMBINED ESOPHAGOSCOPY, GASTROSCOPY, DUODENOSCOPY (EGD);  Surgeon: Rocael Rizvi MD;  Location: [facility] OR     ESOPHAGOSCOPY, GASTROSCOPY, DUODENOSCOPY (EGD), COMBINED N/A 1/25/2017    Procedure: COMBINED ESOPHAGOSCOPY, GASTROSCOPY, DUODENOSCOPY (EGD), BIOPSY SINGLE OR MULTIPLE;  Surgeon: Rocael Tejada MD;  Location: [facility] GI     ESOPHAGOSCOPY, GASTROSCOPY, DUODENOSCOPY (EGD), COMBINED N/A 3/30/2017    Procedure: COMBINED ESOPHAGOSCOPY, GASTROSCOPY, DUODENOSCOPY (EGD);  Surgeon: Jordana Ramirez MD;  Location: [facility]     ESOPHAGOSCOPY, GASTROSCOPY, DUODENOSCOPY (EGD), COMBINED N/A 1/19/2018    Procedure: COMBINED ESOPHAGOSCOPY, GASTROSCOPY, DUODENOSCOPY (EGD);  Upper Endoscopy with verceal banding; Flexible Sigmoidoscopy;  Surgeon: Guru Jose Kelly MD;  Location: [facility] OR     ESOPHAGOSCOPY, GASTROSCOPY, DUODENOSCOPY (EGD), COMBINED N/A 2/12/2018    Procedure: COMBINED ESOPHAGOSCOPY, GASTROSCOPY, DUODENOSCOPY (EGD);  Esophagogastroduodenoscopy with variceal banding;  Surgeon: Guru Jose Kelly MD;  Location: [facility] OR     ESOPHAGOSCOPY, GASTROSCOPY, DUODENOSCOPY (EGD), COMBINED N/A 3/5/2018    Procedure:  COMBINED ESOPHAGOSCOPY, GASTROSCOPY, DUODENOSCOPY (EGD);  Esophagogastroduodenoscopy  with banding of varices Latex Allergy ;  Surgeon: Guru Jose Kelly MD;  Location:  OR     ESOPHAGOSCOPY, GASTROSCOPY, DUODENOSCOPY (EGD), COMBINED N/A 4/16/2018    Procedure: COMBINED ESOPHAGOSCOPY, GASTROSCOPY, DUODENOSCOPY (EGD);  Upper Endoscopy  with esophageal varices banding; Latex Allergy ;  Surgeon: Guru Jose Kelly MD;  Location:  OR     ESOPHAGOSCOPY, GASTROSCOPY, DUODENOSCOPY (EGD), COMBINED N/A 5/30/2018    Procedure: COMBINED ESOPHAGOSCOPY, GASTROSCOPY, DUODENOSCOPY (EGD);  upper endoscopy with banding of esophageal varices. *latex Allergy*;  Surgeon: Guru Jose Kelly MD;  Location:  OR     OPTICAL TRACKING SYSTEM CRANIOTOMY, EXCISE TUMOR, COMBINED  6/6/2013    Procedure: COMBINED OPTICAL TRACKING SYSTEM CRANIOTOMY, EXCISE TUMOR;  Left Stealth Guided Craniotomy , Tumor Resection ;  Surgeon: J Carlos Aranda MD;  Location:  OR     ORTHOPEDIC SURGERY      hand surgery- right     SIGMOIDOSCOPY FLEXIBLE N/A 11/24/2015    Procedure: SIGMOIDOSCOPY FLEXIBLE;  Surgeon: Rocael Rizvi MD;  Location:  GI     SIGMOIDOSCOPY FLEXIBLE N/A 1/19/2018    Procedure: SIGMOIDOSCOPY FLEXIBLE;;  Surgeon: Guru Jose Kelly MD;  Location:  OR       Family History   Problem Relation Age of Onset     Adopted: Yes     Medical History Unknown Mother      Cirrhosis Father      Medical History Unknown Brother      Medical History Unknown Brother      Medical History Unknown Brother        Social History   Substance Use Topics     Smoking status: Never Smoker     Smokeless tobacco: Never Used     Alcohol use No      Comment: Quit 01/2014       Current Facility-Administered Medications   Medication     0.9% sodium chloride BOLUS     Current Outpatient Prescriptions   Medication     blood glucose monitoring (ONETOUCH ULTRA) test strip     Calcium  "Carb-Cholecalciferol (CALCIUM 500 +D) 500-400 MG-UNIT TABS     CANE, ANY MATERIAL     ciclopirox (LOPROX) 0.77 % cream     cyanocobalamin (VITAMIN  B-12) 1000 MCG tablet     dulaglutide (TRULICITY) 1.5 MG/0.5ML pen     ferrous sulfate (IRON) 325 (65 FE) MG tablet     furosemide (LASIX) 20 MG tablet     gabapentin (NEURONTIN) 100 MG capsule     HYDROcodone-acetaminophen (NORCO) 5-325 MG per tablet     hydrOXYzine (ATARAX) 25 MG tablet     lactulose (CHRONULAC) 10 GM/15ML solution     levothyroxine (SYNTHROID/LEVOTHROID) 88 MCG tablet     lidocaine (LMX4) 4 % CREA cream     methylphenidate (RITALIN) 10 MG tablet     mirtazapine (REMERON) 45 MG tablet     multivitamin, therapeutic with minerals (THERA-VIT-M) TABS     mupirocin (BACTROBAN) 2 % ointment     omeprazole (PRILOSEC) 20 MG CR capsule     ONE TOUCH LANCETS MISC     phenytoin (DILANTIN) 30 MG CR capsule     pravastatin (PRAVACHOL) 80 MG tablet     spironolactone (ALDACTONE) 25 MG tablet     thiamine (VITAMIN B-1) 100 MG tablet     traZODone (DESYREL) 50 MG tablet     VITAMIN D, CHOLECALCIFEROL, PO     XIFAXAN 550 MG TABS tablet        Allergies   Allergen Reactions     Latex Itching and Rash     No Clinical Screening - See Comments      Coban and Surgilast cause itching     Tegaderm Transparent Dressing (Informational Only)          Review of Systems   Unable to perform ROS: Mental status change   Neurological: Positive for weakness (right sided).       Physical Exam   BP: 91/79  Pulse: 69  Temp: 98.2  F (36.8  C)  Resp: 18  Height: 177.8 cm (5' 10\")  Weight: 78.5 kg (173 lb)  SpO2: 100 %      Physical Exam   Constitutional: No distress.   HENT:   Head: Atraumatic.   Mouth/Throat: Oropharynx is clear and moist. No oropharyngeal exudate.   Eyes: Pupils are equal, round, and reactive to light. No scleral icterus.   Cardiovascular: Normal heart sounds and intact distal pulses.    Pulmonary/Chest: Breath sounds normal. No respiratory distress.   Abdominal: Soft. " Bowel sounds are normal. There is no tenderness.   Musculoskeletal: He exhibits no edema or tenderness.   Skin: Skin is warm. No rash noted. He is not diaphoretic.   Nursing note and vitals reviewed.      ED Course   11:52 AM  The patient was seen and examined by Ann Richards MD in Room ED20.     ED Course     Procedures             Critical Care time:  none             Labs Ordered and Resulted from Time of ED Arrival Up to the Time of Departure from the ED - No data to display         Assessments & Plan (with Medical Decision Making)     7 year old male with a medical history significant for alcoholic cirrhosis of liver with ascites, hepatic encephalopathy, type 2 diabetes mellitus, thrombocytopenia, rectal/esophageal varices and portal vein thrombosis not currently on anticoagulation, who presents to the Emergency Department today for evaluation of altered mental status and fall. IV established and labs revelaed ammonia of 67. CT head revealed no acute process. Stroke team consulted and signed off as unlikely CNS event. Case discussed with Medicine and arrangements made for admission.      I have reviewed the nursing notes.    I have reviewed the findings, diagnosis, plan and need for follow up with the patient.    New Prescriptions    No medications on file       Final diagnoses:   Hepatic encephalopathy (H)     IChris, am serving as a trained medical scribe to document services personally performed by Ann Richards MD, based on the provider's statements to me.   Ann THOMAS MD, was physically present and have reviewed and verified the accuracy of this note documented by Chris Foster.    9/14/2018   [facility], EMERGENCY DEPARTMENT     Ann Richards MD  09/25/18 [id number]

## 2018-09-14 NOTE — ED NOTES
[facility], [place]   ED Nurse to Floor Handoff     Mono Varghese is a 47 year old male who speaks English and lives with a spouse,  in a home  They arrived in the ED by ambulance from home    ED Chief Complaint: Altered Mental Status and Fall    ED Dx;   Final diagnoses:   Hepatic encephalopathy (H)         Needed?: No    Allergies:   Allergies   Allergen Reactions     Latex Itching and Rash     No Clinical Screening - See Comments      Coban and Surgilast cause itching     Tegaderm Transparent Dressing (Informational Only)    .  Past Medical Hx:   Past Medical History:   Diagnosis Date     ADHD      Alcoholic cirrhosis of liver with ascites (H) 06/17/2015    cirrhosis secondary to alcohol, complicated by variceal bleeding and hepatic encephalopathy      Ascites due to alcoholic cirrhosis (H)     Requiring regular paracentesis.     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 status post subtotal resection by Dr. J Carlos Aranda in early 06/2013. Negative for 1p/19q deletions. S/P Concurrent chemoradiation July 15 through August 23, 2013. Initiated adjuvant oral temozolomide 9/17/13; switch     H/O LENA (obstructive sleep apnea)-moderate 12/18/2012    Pt states this is not currently an  issue.     Hyperlipidemia LDL goal <100 8/1/2016     Hypothyroidism 8/1/2016     Liver failure (H)      Moderate major depression (H) 10/3/2012     Obesity      Pancytopenia (H) 8/1/2016    Chronic pancytopenia secondary to liver disease since at least 2012      Partial epilepsy with impairment of consciousness (H) 05/07/2014     Portal hypertensive gastropathy      Portal vein thrombosis 12/18/2015    history of left lower extremity deep vein thrombosis as well as portal vein and superior mesenteric vein thrombosis, late 2015 when he was hospitalized in the ICU and seriously ill temporary IVC filter placed in 12/2015 when he was in the ICU with deep vein thromboses and active bleeding      Pulmonary nodules 6/17/2015     Rectal bleeding      Type 2 diabetes mellitus (H) 10/3/2012      Baseline Mental status: mild dementia  Current Mental Status changes: other pt is confused, non-combative, wife reports pt is more confused that normal    Infection present or suspected this encounter: no  Sepsis suspected: No  Isolation type: No active isolations     Activity level - Baseline/Home:  Stand with Assist  Activity Level - Current:   Stand with Assist    Bariatric equipment needed?: No    In the ED these meds were given:   Medications   0.9% sodium chloride BOLUS (500 mLs Intravenous New Bag 9/14/18 1355)   iopamidol (ISOVUE-370) solution 115 mL (115 mLs Intravenous Given 9/14/18 1209)   sodium chloride 0.9 % bag 500mL for CT scan flush use (100 mLs Intravenous Given 9/14/18 1209)   lactulose (CHRONULAC) solution 20 g (20 g Oral Given 9/14/18 1348)       Drips running?  No    Home pump  No    Current LDAs  Peripheral IV 09/14/18 Left Hand (Active)   Number of days:0       Peripheral IV 09/14/18 Right Upper forearm (Active)   Number of days:0       Labs results:   Labs Ordered and Resulted from Time of ED Arrival Up to the Time of Departure from the ED   CBC WITH PLATELETS DIFFERENTIAL - Abnormal; Notable for the  following:        Result Value    WBC 2.1 (*)     RBC Count 4.04 (*)     Hemoglobin 13.0 (*)     Hematocrit 38.5 (*)     RDW 15.2 (*)     Platelet Count 42 (*)     Absolute Neutrophil 1.4 (*)     Absolute Lymphocytes 0.3 (*)     All other components within normal limits   BASIC METABOLIC PANEL - Abnormal; Notable for the following:     Glucose 148 (*)     All other components within normal limits   GLUCOSE BY METER - Abnormal; Notable for the following:     Glucose 145 (*)     All other components within normal limits   AMMONIA - Abnormal; Notable for the following:     Ammonia 67 (*)     All other components within normal limits   PHENYTOIN LEVEL - Abnormal; Notable for the following:     Phenytoin Level 9.9 (*)     All other components within normal limits   ISTAT INR POCT - Abnormal; Notable for the following:     ISTAT INR 1.2 (*)     All other components within normal limits   GLUCOSE MONITOR NURSING POCT   PARTIAL THROMBOPLASTIN TIME   TROPONIN I   CREATININE POCT   PULSE OXIMETRY NURSING   ASSESSMENT   NOTIFY   ISTAT INR NURSING POCT   ISTAT TROPONIN NURSING POCT   ISTAT CREATININE NURSING POCT   VITAL SIGNS AND NEURO CHECKS   ACTIVITY   TROPONIN POCT   ABO/RH TYPE AND SCREEN       Imaging Studies:   Recent Results (from the past 24 hour(s))   CT Head Neck Angio w/o & w Contrast    Narrative    CTA HEAD NECK WITH CONTRAST 9/14/2018 12:18 PM    Head CT without contrast  CT angiogram of the neck   CT angiogram of the base of the brain with contrast  Reconstruction by the Radiologist on the 3D workstation    Provided History:  Evaluate for dissection/thromboembolism;     Comparison:  5/3/2018.      Technique:  HEAD CT:  Using multidetector thin collimation helical acquisition  technique, axial, coronal and sagittal CT images from the skull base  to the vertex were obtained without intravenous contrast.   HEAD and NECK CTA: During rapid bolus intravenous injection of  nonionic contrast material, axial images were  obtained using thin  collimation multidetector helical technique from the base of the skull  through the [place] of [place]. This CT angiogram data was reconstructed  at thin intervals with mild overlap. Images were sent to the [employer]  workstation, and 3D reconstructions were obtained. The axial source  images, multiplanar reformations, 3D reconstructions in both maximum  intensity projection display and volume rendered models were reviewed,  with reconstructions performed by the technologist and the  radiologist.    Contrast: 115cc of Isovue 370    Findings:  Head CT: There is no evidence of intracranial hemorrhage, mass effect,  or midline shift. Unchanged left parietal resection cavity and  craniotomy with associated ex vacuo dilatation of the left lateral  ventricle. Gray/white matter differentiation in both cerebral  hemispheres is otherwise preserved. There is mild patchy low  attenuation within the periventricular and supraventricular white  matter of both cerebral hemispheres, nonspecific but most likely  representing chronic small vessel ischemic disease given the patient's  age. Ventricles are proportionate to the cerebral sulci.     Mild dependent left mastoid effusion, unchanged.    Head CTA demonstrates no aneurysm or stenosis of the major  intracranial arteries. The anterior communicating artery is patent.  The posterior communicating arteries are patent bilaterally.     Neck CTA demonstrates patent great vessel origins from the aortic  arch. Normal variant common origin of the right innominate and left  common carotid arteries. Atherosclerotic plaque with focal short  segment narrowing of the proximal right ICA with residual lumen  diameter of 4.1 mm and normal distal diameter of 4.8 mm, corresponding  to 10-20% diameter stenosis. Normal left ICA measures 4.9 mm.  Atherosclerotic plaque at the left carotid bifurcation without  associated stenosis.    No mass is noted within the visualized portions of  "the cervical soft  tissues or lung apices.       Impression    Impression:    1. Head CTA demonstrates no aneurysm or stenosis of the major  intracranial arteries.   2. Neck CTA demonstrates 10-20% diameter short segment stenosis of the  proximal right ICA. No left ICA or vertebral artery stenosis. No  evidence for arterial dissection.  3. No evidence of intracranial hemorrhage on the noncontrast head CT.     4. Unchanged appearance of the left parietal tumor resection cavity.    I have personally reviewed the examination and initial interpretation  and I agree with the findings.    KARLI JIMENEZ MD       Recent vital signs:   /85  Pulse 72  Temp 98.2  F (36.8  C) (Oral)  Resp 16  Ht 1.778 m (5' 10\")  Wt 78.5 kg (173 lb)  SpO2 96%  BMI 24.82 kg/m2    Cardiac Rhythm: Normal Sinus  Pt needs tele? No  Skin/wound Issues: have not seen pt's bottom, pt has dry skin - some brusing noted on arm    Code Status: DNR / DNI    Pain control: pt had none    Nausea control: pt had none    Abnormal labs/tests/findings requiring intervention:     Family present during ED course? Yes   Family Comments/Social Situation comments: wife and sister at bedside    Tasks needing completion: None    Judy Velasco, RN    [phone] [facility]    "

## 2018-09-14 NOTE — LETTER
Transition Communication Hand-off for Care Transitions to Next Level of Care Provider    Name: Mono Varghese  : 1970  MRN #: [id number]  Primary Care Provider: King Louis     Primary Clinic: [address] [place] [address] 88  [facility] [address]     Reason for Hospitalization:  Hepatic encephalopathy (H) [K72.90]  Admit Date/Time: 2018 11:37 AM  Discharge Date:   Payor Source: Payor: MEDICARE / Plan: MEDICARE / Product Type: Medicare /     Readmission Assessment Measure (URVASHI) Risk Score/category: 15/High         Reason for Communication Hand-off Referral: Multiple providers/specialties    Discharge Plan:  Patient will discharge to home with resumption of home care, home PT/OT. Patient will have 24hr supervision in his home.      Concern for non-adherence with plan of care:   Y  Discharge Needs Assessment:  Needs       Most Recent Value    Equipment Currently Used at Home cane, straight, grab bar, walker, rolling, shower chair    Transportation Available family or friend will provide          Follow-up specialty is recommended: Yes    Follow-up plan:  Future Appointments  Date Time Provider Department Center   2018 6:00 AM Kelsey Ayers OTA [facility] O   2018 1:50 PM King Louis MD [facility]   2018 3:20 PM King Louis MD [facility]   10/2/2018 10:00 AM Leroy Alcaraz MD URPSY [facility]   10/2/2018 1:30 PM Anil English MD [facility]   2018 10:45 AM [address]   2019 9:00 AM  LAB [facility]CL[facility]   2019 10:00 AM Beatriz Tanner MD [facility]   2019 10:00 AM [address]   5/3/2019 10:45 AM Lauro Shaw MD [facility]       Any outstanding tests or procedures:        Referrals     Future Labs/Procedures    Home care nursing referral     Comments:    RN skilled nursing visit. RN to assess vital signs and weight, respiratory and cardiac status, patients ability to take and record daily blood pressure, temp  and weight, pain level and activity tolerance, hydration, nutrition and bowel status and home safety.  RN to teach medication management.  RN to provide lab draws.    _______________________  [facility]  Phone  [phone]  Fax  [phone]  ______________________    [facility] to resume all previous home care orders.       Your provider has ordered home care nursing services. If you have not been contacted within 2 days of your discharge please call the inpatient department phone number at [phone] .    Home Care PT Referral for Hospital Discharge     Comments:    Physical Therapy and Occupational therapy to eval and treat.  _______________________  [facility]  Phone  [phone]  Fax  [phone]  ______________________    Your provider has ordered home care - physical and occupational therapy If you have not been contacted within 2 days of your discharge please call the department phone number listed on the top of this document.    Occupational Therapy Referral     Comments:    *This therapy referral will be filtered to a centralized scheduling office at [facility] and the patient will receive a call to schedule an appointment at a [place] location most convenient for them. *     [facility] provides Occupational Therapy evaluation and treatment and many specialty services across the [place] system.  If requesting a specialty program, please choose from the list below.    If you have not heard from the scheduling office within 2 business days, please call [phone] for all locations, with the exception of [place], please call [phone] and [facility], please call [phone]    Treatment: Evaluation & Treatment  Special Instructions/Modalities: n/a  Special Programs: Assistive Technology, Cognition Assessment      Please be aware that coverage of these services is subject to the terms and limitations of your health insurance plan.  " Call member services at your health plan with any benefit or coverage questions.      **Note to Provider:  If you are referring outside of [place] for the therapy appointment, please list the name of the location in the \"special instructions\" above, print the referral and give to the patient to schedule the appointment.    Physical Therapy Referral     Comments:    *This therapy referral will be filtered to a centralized scheduling office at [facility] and the patient will receive a call to schedule an appointment at a [place] location most convenient for them. *     [facility] provides Physical Therapy evaluation and treatment and many specialty services across the [place] system.  If requesting a specialty program, please choose from the list below.    If you have not heard from the scheduling office within 2 business days, please call [phone] for all locations, with the exception of [place], please call [phone] and [facility], please call [phone]  Treatment: Evaluation & Treatment  Special Instructions/Modalities: n/a  Special Programs: Balance/Vestibular    Please be aware that coverage of these services is subject to the terms and limitations of your health insurance plan.  Call member services at your health plan with any benefit or coverage questions.      **Note to Provider:  If you are referring outside of [place] for the therapy appointment, please list the name of the location in the \"special instructions\" above, print the referral and give to the patient to schedule the appointment.            Yisel Prabhakar, SHUKRI, BSN    [facility]    Medicine Group  [address] [place] [address]    [email or web address]    Office: [phone] Pager: [phone]  To contact weekend RNCC, dial * * *[phone] and enter pager number [phone] at prompt. This pager can not be contacted by text page or outside " line.          AVS/Discharge Summary is the source of truth; this is a helpful guide for improved communication of patient story

## 2018-09-14 NOTE — PHARMACY
Pharmacy Stroke Code Response  Pharmacist responded as part of the Stroke Code Team activation to patient care area ED20.  The Stroke Team determined that the patient was not a candidate for IV alteplase therapy and the pharmacy team was dismissed at 1158.

## 2018-09-14 NOTE — PROGRESS NOTES
Stroke Code Nurse-Responder Note    Arrival Time to Stroke Code: 1156    Stroke Code Team interventions:   - De-escalated at 1218 by Dr. Loida Gaston    ED/Bedside Nurse providing handoff: Judy FONTENOT    Time left for CT: 1156    Time arrived to next location (ED/Unit/IR): Returned to ED at 1216    ED/Bedside Nurse given handoff (name/time): Judy CROOK given bedside handoff at 1218    Boone Renae RN

## 2018-09-14 NOTE — MR AVS SNAPSHOT
After Visit Summary   9/14/2018    Mono Varghese    MRN: [id number]           Patient Information     Date Of Birth          1970        Visit Information        Provider Department      9/14/2018 8:00 PM [facility] EEG TECH 4 [facility] EEG        Today's Diagnoses     Seizures (H)    -  1       Follow-ups after your visit        Your next 10 appointments already scheduled     Sep 15, 2018  7:00 AM CDT   24 Hour Video Visit with [facility] EEG TECH 4   [facility] EEG ([facility])    [facility]  [address] [facility] [address]   [phone]           [place]: Your appointment is scheduled at [facility]. [address] [place] [address]            Sep 26, 2018  1:50 PM CDT   (Arrive by 1:35 PM)   Return Visit with King Louis MD   [facility] ([facility])    [address] [place] [address] [phone]            Sep 26, 2018  3:20 PM CDT   (Arrive by 3:05 PM)   PRE-OP with King Louis MD   [facility] ([facility])    [address] [place] [id number] [phone]            Oct 01, 2018   Procedure with Guru Jose Kelly MD   [facility] [place], Same Day Surgery (--)    [address] [facility] [address]   [phone]            Oct 02, 2018 10:00 AM CDT   Adult Med Follow UP with Leroy Alcaraz MD   Psychiatry Clinic ([facility])    [address]  [address] [address] [phone]            Oct 02, 2018  1:30 PM CDT   (Arrive by 1:15 PM)   Return Seizure with Anil English MD   [facility] Neurology ([facility])    [address] [place] [id number] [phone]            Nov 21, 2018 10:45 AM CST   MR ABDOMEN W/O & W CONTRAST with UCMR1   [facility]  Imaging Center MRI ([facility] [place])    [address] [facility]  1st Floor  [facility] [address] [phone]           Take your medicines as usual, unless your doctor tells you not to. Bring a list of your current medicines to your exam (including vitamins, minerals and over-the-counter drugs). Also bring the results of similar scans you may have had.    You may or may not receive IV contrast for this exam pending the discretion of the Radiologist.   Do not eat or drink for 6 hours prior to exam.  The MRI machine uses a strong magnet. Please wear clothes without metal (snaps, zippers). A sweatsuit works well, or we may give you a hospital gown.  Please remove any body piercings and hair extensions before you arrive. You will also remove watches, jewelry, hairpins, wallets, dentures, partial dental plates and hearing aids. You may wear contact lenses, and you may be able to wear your rings. We have a safe place to keep your personal items, but it is safer to leave them at home.  **IMPORTANT** THE INSTRUCTIONS BELOW ARE ONLY FOR THOSE PATIENTS WHO HAVE BEEN PRESCRIBED SEDATION OR GENERAL ANESTHESIA DURING THEIR MRI PROCEDURE:  IF YOUR DOCTOR PRESCRIBED ORAL SEDATION (take medicine to help you relax during your exam):   You must get the medicine from your doctor (oral medication) before you arrive. Bring the medicine to the exam. Do not take it at home. You ll be told when to take it upon arriving for your exam.   Arrive one hour early. Bring someone who can take you home after the test. Your medicine will make you sleepy. After the exam, you may not drive, take a bus or take a taxi by yourself.  IF YOUR DOCTOR PRESCRIBED IV SEDATION:   Arrive one hour early. Bring someone who can take you home after the test. Your medicine will make you sleepy. After the exam, you may not drive, take a bus or take a taxi by yourself.   No eating 6 hours before your exam. You may have clear liquids up until 4  hours before your exam. (Clear liquids include water, clear tea, black coffee and fruit juice without pulp.)  IF YOUR DOCTOR PRESCRIBED ANESTHESIA (be asleep for your exam):   Arrive 1 1/2 hours early. Bring someone who can take you home after the test. You may not drive, take a bus or take a taxi by yourself.   No eating 8 hours before your exam. You may have clear liquids up until 4 hours before your exam. (Clear liquids include water, clear tea, black coffee and fruit juice without pulp.)   You will spend four to five hours in the recovery room.  If you have any questions, please contact your Imaging Department exam site.            Feb 13, 2019  9:00 AM CST   Lab with  LAB   [facility] Lab ([facility])    [address] [place] [address]  1st [facility] [address] [phone]            Feb 13, 2019 10:00 AM CST   (Arrive by 9:45 AM)   Return General Liver with Beatriz Tanner MD   [facility] Hepatology ([facility])    [address] [place] [address]  [address]  [facility] [address] [phone]            Apr 26, 2019 10:00 AM CDT   MR BRAIN W/O & W CONTRAST with [address] MRI ([facility])    [address] [place] [address] [phone]           Take your medicines as usual, unless your doctor tells you not to. Bring a list of your current medicines to your exam (including vitamins, minerals and over-the-counter drugs).  You may or may not receive intravenous (IV) contrast for this exam pending the discretion of the Radiologist.  You do not need to do anything special to prepare.  The MRI machine uses a strong magnet. Please wear clothes without metal (snaps, zippers). A sweatsuit works well, or we may give you a hospital gown.  Please remove any body piercings and hair extensions before you arrive. You will also remove watches, jewelry, hairpins, wallets, dentures, partial  dental plates and hearing aids. You may wear contact lenses, and you may be able to wear your rings. We have a safe place to keep your personal items, but it is safer to leave them at home.  **IMPORTANT** THE INSTRUCTIONS BELOW ARE ONLY FOR THOSE PATIENTS WHO HAVE BEEN PRESCRIBED SEDATION OR GENERAL ANESTHESIA DURING THEIR MRI PROCEDURE:  IF YOUR DOCTOR PRESCRIBED ORAL SEDATION (take medicine to help you relax during your exam):   You must get the medicine from your doctor (oral medication) before you arrive. Bring the medicine to the exam. Do not take it at home. You ll be told when to take it upon arriving for your exam.   Arrive one hour early. Bring someone who can take you home after the test. Your medicine will make you sleepy. After the exam, you may not drive, take a bus or take a taxi by yourself.  IF YOUR DOCTOR PRESCRIBED IV SEDATION:   Arrive one hour early. Bring someone who can take you home after the test. Your medicine will make you sleepy. After the exam, you may not drive, take a bus or take a taxi by yourself.   No eating 6 hours before your exam. You may have clear liquids up until 4 hours before your exam. (Clear liquids include water, clear tea, black coffee and fruit juice without pulp.)  IF YOUR DOCTOR PRESCRIBED ANESTHESIA (be asleep for your exam):   Arrive 1 1/2 hours early. Bring someone who can take you home after the test. You may not drive, take a bus or take a taxi by yourself.   No eating 8 hours before your exam. You may have clear liquids up until 4 hours before your exam. (Clear liquids include water, clear tea, black coffee and fruit juice without pulp.)   You will spend four to five hours in the recovery room.  Please call the Imaging Department at your exam site with any questions.              Future tests that were ordered for you today     Open Standing Orders        Priority Remaining Interval Expires Ordered    CBC with platelets Routine 1/1 AM DRAW  9/14/2018     Comprehensive metabolic panel Routine 1/1 AM DRAW  9/14/2018    INR Routine 1/1 AM DRAW  9/14/2018    Activity: Up ad tj Routine 56402/30065 PRN  9/14/2018    Daily weights Routine 1/1 DAILY  9/14/2018    Oxygen: Nasal cannula, Oxygen mask STAT 11072/17216 CONTINUOUS  9/14/2018          Open Future Orders        Priority Expected Expires Ordered    FLEXIBLE SIGMOIDOSCOPY Routine  10/29/2018 9/14/2018            Who to contact     Please call your clinic at [phone] to:    Ask questions about your health    Make or cancel appointments    Discuss your medicines    Learn about your test results    Speak to your doctor            Additional Information About Your Visit        [employer] Information     [employer] gives you secure access to your electronic health record. If you see a primary care provider, you can also send messages to your care team and make appointments. If you have questions, please call your primary care clinic.  If you do not have a primary care provider, please call [phone] and they will assist you.      [employer] is an electronic gateway that provides easy, online access to your medical records. With [employer], you can request a clinic appointment, read your test results, renew a prescription or communicate with your care team.     To access your existing account, please contact your [facility] or call [phone] for assistance.        Care EveryWhere ID     This is your Care EveryWhere ID. This could be used by other organizations to access your [facility] medical records  [id number]         Blood Pressure from Last 3 Encounters:   09/14/18 107/71   09/06/18 (!) 109/94   08/24/18 111/72    Weight from Last 3 Encounters:   09/14/18 77.7 kg (171 lb 4.8 oz)   09/06/18 80.5 kg (177 lb 7.5 oz)   08/24/18 79.7 kg (175 lb 11.2 oz)              Today, you had the following     No orders found for display         Today's Medication Changes      Notice     This visit is  during an admission. Changes to the med list made in this visit will be reflected in the After Visit Summary of the admission.             Primary Care Provider Office Phone # Fax #    King Louis [phone]       [address] [address]        Equal Access to Services     SAMUEL FAIR : Hadii aad ku hadasho Soomaali, waaxda luqadaha, qaybta kaalmada adeegyada, waxclementine arroyoin haydonavonn dorothea calderonseradenise tamayo. So [facility] [phone].    ATENCIÓN: Si habla español, tiene a chiang disposición servicios gratuitos de asistencia lingüística. Llame al [phone].    We comply with applicable federal civil rights laws and [place] laws. We do not discriminate on the basis of race, color, national origin, age, disability, sex, sexual orientation, or gender identity.            Thank you!     Thank you for choosing [facility]  for your care. Our goal is always to provide you with excellent care. Hearing back from our patients is one way we can continue to improve our services. Please take a few minutes to complete the written survey that you may receive in the mail after your visit with us. Thank you!             Your Updated Medication List - Protect others around you: Learn how to safely use, store and throw away your medicines at www.disposemymeds.org.      Notice     This visit is during an admission. Changes to the med list made in this visit will be reflected in the After Visit Summary of the admission.

## 2018-09-15 NOTE — PLAN OF CARE
Problem: Patient Care Overview  Goal: Plan of Care/Patient Progress Review  Outcome: No Change   09/15/18 1819   OTHER   Plan Of Care Reviewed With patient   Plan of Care Review   Progress no change     Temp: 97.4  F (36.3  C) Temp src: Oral BP: 111/80   Heart Rate: 79 Resp: 18 SpO2: 98 % O2 Device: None (Room air)       Patient is here for encephalopathy:    -speech very slow and takes time to utter a complete sentence  -on scheduled Lactulose, having loose stools  -disoriented to time but uses call-light appropriately  -denies pain and nausea  -up with 1 assist  -port heplocked    Plan: MRI of the brain.  Bed-alarm for safety.

## 2018-09-15 NOTE — PLAN OF CARE
Problem: Patient Care Overview  Goal: Plan of Care/Patient Progress Review  AVSS, denies need for pain med. Abdominal discomfort improved after bedside paracentesis, 3 liters removed and specimens sent. Obtained urine spec, very dark niko. Given Lasix. Up to bedside commode, standby assist.

## 2018-09-15 NOTE — PROGRESS NOTES
09/15/18 1439   Quick Adds   Type of Visit Initial PT Evaluation   Living Environment   Lives With spouse   Living Arrangements house   Home Accessibility stairs to enter home   Number of Stairs to Enter Home 4   Living Environment Comment pt reports 4 stairs at his  mother in law's place, per chart pt has 2 stairs to enter and then 5 inside home    Self-Care   Dominant Hand right   Usual Activity Tolerance moderate   Current Activity Tolerance moderate   Regular Exercise no   Equipment Currently Used at Home shower chair   Activity/Exercise/Self-Care Comment pt reports having a walker but not using it and using a shower chair, reports his wife is around to help him with most things    Functional Level Prior   Ambulation 0-->independent   Transferring 0-->independent   Toileting 0-->independent   Bathing 3-->assistive equipment and person   Dressing 2-->assistive person   Fall history within last six months yes   Number of times patient has fallen within last six months 6   Which of the above functional risks had a recent onset or change? ambulation;transferring;fall history   Prior Functional Level Comment pt reports ocassional help from his wife at baseline, reports falling about once per month    General Information   Onset of Illness/Injury or Date of Surgery - Date 09/14/18   Referring Physician Akanksha Self MD   Patient/Family Goals Statement to get better   Pertinent History of Current Problem (include personal factors and/or comorbidities that impact the POC) 47 year old male h/o oligoastrocytoma s/p left parietal lobe resection, alcoholic cirrhosis with ascites, and cognitive impairment (noted in neurology visit 07/2015) who presents with expressive aphasia and recent falls    Precautions/Limitations fall precautions   Heart Disease Risk Factors Gender   General Observations pt supine, some expressive aphasia, uncoordinated movements    General Info Comments activity orders: up ad tj   Cognitive  Status Examination   Orientation person;place   Level of Consciousness alert   Follows Commands and Answers Questions 75% of the time   Personal Safety and Judgment impaired   Memory impaired   Cognitive Comment pt with cognitive deficits at baseline per chart    Pain Assessment   Patient Currently in Pain No   Integumentary/Edema   Integumentary/Edema Comments swollen abdomen, ascities    Posture    Posture Forward head position;Protracted shoulders   Range of Motion (ROM)   ROM Quick Adds No deficits were identified   Strength   Manual Muscle Testing Quick Adds No deficits were identified   Bed Mobility   Bed Mobility Comments SBA   Transfer Skills   Transfer Comments CGA with FWW   Gait   Gait Comments CGA 400ft with FWW   Balance   Balance Comments using FWW for balance with gait    Sensory Examination   Sensory Perception no deficits were identified   Coordination   Coordination Comments not overtly tested however pt with incoordinated gait, often reaching into space, etc   General Therapy Interventions   Planned Therapy Interventions progressive activity/exercise;home program guidelines;risk factor education;transfer training;strengthening;gait training;bed mobility training;balance training   Clinical Impression   Criteria for Skilled Therapeutic Intervention yes, treatment indicated   PT Diagnosis impaired mobility    Influenced by the following impairments impaired functional mobility    Functional limitations due to impairments impaired gait, balance    Clinical Presentation Stable/Uncomplicated   Clinical Presentation Rationale pt with clear presentation but complicated PMH    Clinical Decision Making (Complexity) Low complexity   Therapy Frequency` (6x per week )   Predicted Duration of Therapy Intervention (days/wks) 1 week    Anticipated Discharge Disposition Home with Assist   Risk & Benefits of therapy have been explained Yes   Patient, Family & other staff in agreement with plan of care Yes  "  Clinical Impression Comments pt with below baseline mobility, unclear how much assist pt has at home at baseline as pt poor historian. Pending wife's input, anticipate return home with 24 hr assist   [facility] TM \"6 Clicks\"   2016, Trustees of [facility], under license to [employer].  All rights reserved.   6 Clicks Short Forms Basic Mobility Inpatient Short Form   [facility] AM-PAC  \"6 Clicks\" V.2 Basic Mobility Inpatient Short Form   1. Turning from your back to your side while in a flat bed without using bedrails? 4 - None   2. Moving from lying on your back to sitting on the side of a flat bed without using bedrails? 4 - None   3. Moving to and from a bed to a chair (including a wheelchair)? 3 - A Little   4. Standing up from a chair using your arms (e.g., wheelchair, or bedside chair)? 3 - A Little   5. To walk in hospital room? 3 - A Little   6. Climbing 3-5 steps with a railing? 3 - A Little   Basic Mobility Raw Score (Score out of 24.Lower scores equate to lower levels of function) 20   Total Evaluation Time   Total Evaluation Time (Minutes) 8     "

## 2018-09-15 NOTE — PLAN OF CARE
Problem: Patient Care Overview  Goal: Plan of Care/Patient Progress Review  IP OT 5B:  Cancelled.  Pt eating breakfast upon first attempt and with providers on next 3 attempts.  Will reschedule for tomorrow.

## 2018-09-15 NOTE — PLAN OF CARE
Problem: Patient Care Overview  Goal: Plan of Care/Patient Progress Review  Outcome: No Change  Pt alert, oriented x 3. Pt continues to have difficulty finding words and communicating thoughts.  Answers yes/no questions appropriately. [place] Stage 1 encephalopathy.  Discussed protocol and additional lactulose doses with pt and wife, wife stated pt had 8 BM's since ED and refused protocol doses overnight. Set up on vid EEG last evening.  Up to BSC with 1-2 assist.  Pt is unsteady when up and has difficulty following commands at times, bed alarm on.  Pt has called appropriately for assistance overnight.  Continue with POC, update physicians with any changes.

## 2018-09-15 NOTE — MR AVS SNAPSHOT
After Visit Summary   9/15/2018    Mono Varghese    MRN: [id number]           Patient Information     Date Of Birth          1970        Visit Information        Provider Department      9/15/2018 7:00 AM [facility] EEG TECH 4 [facility] EEG        Today's Diagnoses     Encephalopathy    -  1       Follow-ups after your visit        Your next 10 appointments already scheduled     Sep 26, 2018  1:50 PM CDT   (Arrive by 1:35 PM)   Return Visit with King Louis MD   [facility] ([facility])    [address] [place] [address]  [address] [facility] [address]   [phone]            Sep 26, 2018  3:20 PM CDT   (Arrive by 3:05 PM)   PRE-OP with King Louis MD   [facility] ([facility])    [address] [place] [address]  [address] [facility] [address]   [phone]            Oct 01, 2018   Procedure with Guru Jose Kelly MD   [facility] [place], Same Day Surgery (--)    [address] [facility] [address]   [phone]            Oct 02, 2018 10:00 AM CDT   Adult Med Follow UP with Leroy Alcaraz MD   Psychiatry Clinic ([facility])    [place] [id number]  [address] [address] [phone]            Oct 02, 2018  1:30 PM CDT   (Arrive by 1:15 PM)   Return Seizure with Anil English MD   [facility] ([facility])    [address] [place] [address]  [address] [facility] [address]   [phone]            Nov 21, 2018 10:45 AM CST   MR ABDOMEN W/O & W CONTRAST with [facility] [facility] ([facility])    [address] [place] [address]  [address] [facility] [address] [phone]           Take your medicines as usual, unless your doctor tells you not to. Bring a list of your current medicines to your exam (including vitamins, minerals and over-the-counter drugs). Also bring the results of similar  scans you may have had.    You may or may not receive IV contrast for this exam pending the discretion of the Radiologist.   Do not eat or drink for 6 hours prior to exam.  The MRI machine uses a strong magnet. Please wear clothes without metal (snaps, zippers). A sweatsuit works well, or we may give you a hospital gown.  Please remove any body piercings and hair extensions before you arrive. You will also remove watches, jewelry, hairpins, wallets, dentures, partial dental plates and hearing aids. You may wear contact lenses, and you may be able to wear your rings. We have a safe place to keep your personal items, but it is safer to leave them at home.  **IMPORTANT** THE INSTRUCTIONS BELOW ARE ONLY FOR THOSE PATIENTS WHO HAVE BEEN PRESCRIBED SEDATION OR GENERAL ANESTHESIA DURING THEIR MRI PROCEDURE:  IF YOUR DOCTOR PRESCRIBED ORAL SEDATION (take medicine to help you relax during your exam):   You must get the medicine from your doctor (oral medication) before you arrive. Bring the medicine to the exam. Do not take it at home. You ll be told when to take it upon arriving for your exam.   Arrive one hour early. Bring someone who can take you home after the test. Your medicine will make you sleepy. After the exam, you may not drive, take a bus or take a taxi by yourself.  IF YOUR DOCTOR PRESCRIBED IV SEDATION:   Arrive one hour early. Bring someone who can take you home after the test. Your medicine will make you sleepy. After the exam, you may not drive, take a bus or take a taxi by yourself.   No eating 6 hours before your exam. You may have clear liquids up until 4 hours before your exam. (Clear liquids include water, clear tea, black coffee and fruit juice without pulp.)  IF YOUR DOCTOR PRESCRIBED ANESTHESIA (be asleep for your exam):   Arrive 1 1/2 hours early. Bring someone who can take you home after the test. You may not drive, take a bus or take a taxi by yourself.   No eating 8 hours before your exam. You  may have clear liquids up until 4 hours before your exam. (Clear liquids include water, clear tea, black coffee and fruit juice without pulp.)   You will spend four to five hours in the recovery room.  If you have any questions, please contact your Imaging Department exam site.            Feb 13, 2019  9:00 AM CST   Lab with  LAB   [facility] ([facility])    [address] [facility] [address]  1st Floor  [facility] [address] [phone]            Feb 13, 2019 10:00 AM CST   (Arrive by 9:45 AM)   Return General Liver with Beatriz Tanner MD   [facility] Hepatology ([facility])    [address]  [address]  [facility] [address] [phone]            Apr 26, 2019 10:00 AM CDT   MR BRAIN W/O & W CONTRAST with [address] MRI ([facility])    [address] [facility] [facility]  1st [facility] [address] [phone]           Take your medicines as usual, unless your doctor tells you not to. Bring a list of your current medicines to your exam (including vitamins, minerals and over-the-counter drugs).  You may or may not receive intravenous (IV) contrast for this exam pending the discretion of the Radiologist.  You do not need to do anything special to prepare.  The MRI machine uses a strong magnet. Please wear clothes without metal (snaps, zippers). A sweatsuit works well, or we may give you a hospital gown.  Please remove any body piercings and hair extensions before you arrive. You will also remove watches, jewelry, hairpins, wallets, dentures, partial dental plates and hearing aids. You may wear contact lenses, and you may be able to wear your rings. We have a safe place to keep your personal items, but it is safer to leave them at home.  **IMPORTANT** THE INSTRUCTIONS BELOW ARE ONLY FOR THOSE PATIENTS WHO HAVE BEEN PRESCRIBED SEDATION OR GENERAL ANESTHESIA DURING THEIR MRI PROCEDURE:  IF YOUR DOCTOR  PRESCRIBED ORAL SEDATION (take medicine to help you relax during your exam):   You must get the medicine from your doctor (oral medication) before you arrive. Bring the medicine to the exam. Do not take it at home. You ll be told when to take it upon arriving for your exam.   Arrive one hour early. Bring someone who can take you home after the test. Your medicine will make you sleepy. After the exam, you may not drive, take a bus or take a taxi by yourself.  IF YOUR DOCTOR PRESCRIBED IV SEDATION:   Arrive one hour early. Bring someone who can take you home after the test. Your medicine will make you sleepy. After the exam, you may not drive, take a bus or take a taxi by yourself.   No eating 6 hours before your exam. You may have clear liquids up until 4 hours before your exam. (Clear liquids include water, clear tea, black coffee and fruit juice without pulp.)  IF YOUR DOCTOR PRESCRIBED ANESTHESIA (be asleep for your exam):   Arrive 1 1/2 hours early. Bring someone who can take you home after the test. You may not drive, take a bus or take a taxi by yourself.   No eating 8 hours before your exam. You may have clear liquids up until 4 hours before your exam. (Clear liquids include water, clear tea, black coffee and fruit juice without pulp.)   You will spend four to five hours in the recovery room.  Please call the Imaging Department at your exam site with any questions.            May 03, 2019 10:45 AM CDT   (Arrive by 10:30 AM)   Return Visit with Lauro Shaw MD   [facility] ([facility])    [address] [place] [address]  [address]  [facility] [address] [phone]              Future tests that were ordered for you today     Open Standing Orders        Priority Remaining Interval Expires Ordered    Comprehensive metabolic panel Routine 1/1 AM DRAW  9/15/2018    CBC with platelets Routine 1/1 AM DRAW  9/15/2018    INR Routine 1/1 AM DRAW  9/15/2018    Magnesium Routine 1/1  AM DRAW  9/15/2018    Potassium Routine 100/100 CONDITIONAL (SPECIFY)  9/15/2018    Magnesium Routine 100/100 CONDITIONAL (SPECIFY)  9/15/2018    Activity: Up ad tj Routine 67895/30032 PRN  9/14/2018    Oxygen: Nasal cannula, Oxygen mask STAT 68161/46220 CONTINUOUS  9/14/2018          Open Future Orders        Priority Expected Expires Ordered    FLEXIBLE SIGMOIDOSCOPY Routine  10/29/2018 9/14/2018            Who to contact     Please call your clinic at [phone] to:    Ask questions about your health    Make or cancel appointments    Discuss your medicines    Learn about your test results    Speak to your doctor            Additional Information About Your Visit        Enlighted Information     Enlighted gives you secure access to your electronic health record. If you see a primary care provider, you can also send messages to your care team and make appointments. If you have questions, please call your primary care clinic.  If you do not have a primary care provider, please call [phone] and they will assist you.      Enlighted is an electronic gateway that provides easy, online access to your medical records. With Enlighted, you can request a clinic appointment, read your test results, renew a prescription or communicate with your care team.     To access your existing account, please contact your [facility] or call [phone] for assistance.        Care EveryWhere ID     This is your Care EveryWhere ID. This could be used by other organizations to access your [place] medical records  [id number]         Blood Pressure from Last 3 Encounters:   09/15/18 111/80   09/06/18 (!) 109/94   08/24/18 111/72    Weight from Last 3 Encounters:   09/14/18 77.7 kg (171 lb 4.8 oz)   09/06/18 80.5 kg (177 lb 7.5 oz)   08/24/18 79.7 kg (175 lb 11.2 oz)              Today, you had the following     No orders found for display         Today's Medication Changes      Notice     This visit is  during an admission. Changes to the med list made in this visit will be reflected in the After Visit Summary of the admission.             Primary Care Provider Office Phone # Fax #    King Louis [phone]       [address] [address]        Equal Access to Services     SAMUEL FAIR : Hadii aad ku hadasho Soomaali, waaxda luqadaha, qaybta kaalmada adeegyada, waxclementine arroyoin haydonavonn dorothea calderonseradenise tamayo. So [facility] [phone].    ATENCIÓN: Si habla español, tiene a chiang disposición servicios gratuitos de asistencia lingüística. Llame al [phone].    We comply with applicable federal civil rights laws and [place] laws. We do not discriminate on the basis of race, color, national origin, age, disability, sex, sexual orientation, or gender identity.            Thank you!     Thank you for choosing [facility]  for your care. Our goal is always to provide you with excellent care. Hearing back from our patients is one way we can continue to improve our services. Please take a few minutes to complete the written survey that you may receive in the mail after your visit with us. Thank you!             Your Updated Medication List - Protect others around you: Learn how to safely use, store and throw away your medicines at www.disposemymeds.org.      Notice     This visit is during an admission. Changes to the med list made in this visit will be reflected in the After Visit Summary of the admission.

## 2018-09-15 NOTE — PROGRESS NOTES
[facility], [place]    Internal Medicine Progress Note - [place] 4 Service    Main Plans for Today   - Cont [place] protocol; if patient fails to improve by tomorrow, will consider MRI  - Video EEG overnight monitor placed overnight, will wait for neuros recs for possible AED changes   - Diagnostic/therapeutic para (low suspicion for SBP but will r/o)  - Cont to monitor cxs     Assessment & Plan   Mono Varghese is a 47 year old male with with history of DM2, thrombocytopenia, cirrhosis c/b refractory ascites, HE, and rectal/esophageal varices, portal vein thrombosis not on anticoagulation who presented with two days of generalized weakness, confusion, and aphasia.      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    Patient's wife reports acute worsening of weakness and word finding difficulties; CT head and neck negative for hemorrhage or aneurysms; no signs to suggest meningitis; UA and blood cx pending; will perform para today; CXR not c/f focal PNA; patient does endorse cough; c/f possibility of seizures given patient's hx of brain cancer and surgery; patient does have asterixis on exam so HE may be contributing;  - [place] protocol, goal 3-4 BMs every day  - Video EEG overnight monitor placed overnight, will wait for neuros recs for possible AED changes   - Diagnotic/therapeuic paracentesis today to r/o SBP (low suspician)  - UA, blood cx pending   -  CXR   - Continue phenytoin      #EtOH cirrhosis c/b rectal and esophageal varices, refractory ascites, portal vein thromboses, and HE    Most recent EGD with grade II EVs s/p 2 bands on 7/11; 9L ascitic fluid removed on 9/11   - Plan on on discharge earlier this month was for patient to f/u w/ GI for possible coiling of rectal varices   - Due for repeat endoscopy this month   - Lactulose as above, cont rifaximin  - Restart Lasix 20mg every day; hold spironolactone 50mg every day  for now  - Follows w/ GI as outpatient for HCC screening   - Cont omeprazole 40mg BID     #Pancytopenia   Patient's Hgb improved since discharge earlier this month; transferrin low at 149 on 9/5; zinc also low at 43; copper, B12, folate wnl; likely 2/2 liver dx vs malnutrition)  - Cont Fe supplementaiton  - Consider zinc supplement     Chronic  # Endo: hypothyroid on synthroid  # Depression/insomnia: continue home mirtazapine 45mg QPM, gabapentin 100mg QPM, Ritalin 10mg BID, Atarax 25mg BID     Diet: Regular   Fluids: none  Lines: PIV   Munoz Catheter: not present     DVT Prophylaxis: PCDs, [place] 3  Code Status: Prior    Interval History   Put on video EEG monitoring overnight per neuro; wife refused more lactulose overnight as she reported the patient had had 8 by that point; no complaints; denied pain     Physical Exam   Vital Signs: Temp: 97.4  F (36.3  C) Temp src: Oral BP: 119/78 Pulse: 71 Heart Rate: 70 Resp: 18 SpO2: 98 % O2 Device: None (Room air)    Weight: 171 lbs 4.76 oz  General Appearance: NAD, on RA  Respiratory: CTA  Cardiovascular: RRR, no m/r/g  GI: soft, distended, non tender  Neurologic: oriented only to person; moving all extremities equally     Data   Medications     - MEDICATION INSTRUCTIONS -         calcium carbonate 500 mg-vitamin D 200 units  1 tablet Oral Daily     cholecalciferol  1,000 Units Oral Daily     ferrous sulfate  325 mg Oral BID     lactulose  20 g Oral TID     levothyroxine  88 mcg Oral Daily     methylphenidate  10 mg Oral BID     mirtazapine  45 mg Oral At Bedtime     omeprazole  40 mg Oral BID     phenytoin  120 mg Oral BID     pravastatin  80 mg Oral Daily     rifaximin  550 mg Oral BID     Data     Recent Labs  Lab 09/14/18  1200 09/14/18  1157   WBC  --  2.1*   HGB  --  13.0*   MCV  --  95   PLT  --  42*   NA  --  139   POTASSIUM  --  4.0   CHLORIDE  --  104   CO2  --  25   BUN  --  16   CR  --  0.95   ANIONGAP  --  10   UCHE  --  8.8   GLC  --  148*   TROPI  --  <0.015    TROPONIN 0.00  --      Recent Results (from the past 24 hour(s))   CT Head Neck Angio w/o & w Contrast    Narrative    CTA HEAD NECK WITH CONTRAST 9/14/2018 12:18 PM    Head CT without contrast  CT angiogram of the neck   CT angiogram of the base of the brain with contrast  Reconstruction by the Radiologist on the 3D workstation    Provided History:  Evaluate for dissection/thromboembolism;     Comparison:  5/3/2018.      Technique:  HEAD CT:  Using multidetector thin collimation helical acquisition  technique, axial, coronal and sagittal CT images from the skull base  to the vertex were obtained without intravenous contrast.   HEAD and NECK CTA: During rapid bolus intravenous injection of  nonionic contrast material, axial images were obtained using thin  collimation multidetector helical technique from the base of the skull  through the [place] of [place]. This CT angiogram data was reconstructed  at thin intervals with mild overlap. Images were sent to the [employer]  workstation, and 3D reconstructions were obtained. The axial source  images, multiplanar reformations, 3D reconstructions in both maximum  intensity projection display and volume rendered models were reviewed,  with reconstructions performed by the technologist and the  radiologist.    Contrast: 115cc of Isovue 370    Findings:  Head CT: There is no evidence of intracranial hemorrhage, mass effect,  or midline shift. Unchanged left parietal resection cavity and  craniotomy with associated ex vacuo dilatation of the left lateral  ventricle. Gray/white matter differentiation in both cerebral  hemispheres is otherwise preserved. There is mild patchy low  attenuation within the periventricular and supraventricular white  matter of both cerebral hemispheres, nonspecific but most likely  representing chronic small vessel ischemic disease given the patient's  age. Ventricles are proportionate to the cerebral sulci.     Mild dependent left mastoid effusion,  unchanged.    Head CTA demonstrates no aneurysm or stenosis of the major  intracranial arteries. The anterior communicating artery is patent.  The posterior communicating arteries are patent bilaterally.     Neck CTA demonstrates patent great vessel origins from the aortic  arch. Normal variant common origin of the right innominate and left  common carotid arteries. Atherosclerotic plaque with focal short  segment narrowing of the proximal right ICA with residual lumen  diameter of 4.1 mm and normal distal diameter of 4.8 mm, corresponding  to 10-20% diameter stenosis. Normal left ICA measures 4.9 mm.  Atherosclerotic plaque at the left carotid bifurcation without  associated stenosis.    No mass is noted within the visualized portions of the cervical soft  tissues or lung apices.       Impression    Impression:    1. Head CTA demonstrates no aneurysm or stenosis of the major  intracranial arteries.   2. Neck CTA demonstrates 10-20% diameter short segment stenosis of the  proximal right ICA. No left ICA or vertebral artery stenosis. No  evidence for arterial dissection.  3. No evidence of intracranial hemorrhage on the noncontrast head CT.     4. Unchanged appearance of the left parietal tumor resection cavity.    I have personally reviewed the examination and initial interpretation  and I agree with the findings.    KARLI JIMENEZ MD   XR Chest 2 Views    Narrative    XR CHEST 2 VW  9/14/2018 6:19 PM      HISTORY: cough with concern for pneumonia;     COMPARISON: 5/3/2018    FINDINGS: PA and lateral views of the chest. Right chest wall  Port-A-Cath with tip in the right atrium. No acute airspace opacities.  No pneumothorax or pleural effusion. Heart size is normal. Bibasilar  discoid atelectasis.      Impression    IMPRESSION: New bibasilar discoid atelectasis..    I have personally reviewed the examination and initial interpretation  and I agree with the findings.    MIRELLA DURÁN MD

## 2018-09-15 NOTE — PLAN OF CARE
Problem: Patient Care Overview  Goal: Plan of Care/Patient Progress Review  PT 5B: PT evaluation completed, treatment initiated.   Discharge Planner PT   Patient plan for discharge: home with assist   Current status: pt ambulates 400ft with FWW and CGA, completes transfers and bed mobility with SBA-CGA. Pt reports feeling weaker than normal however pt poor historian and no family members present for session. Unclear what pt's baseline is and how much assist is available at home.  Barriers to return to prior living situation: pt needing assist for safe mobility, stairs at home  Recommendations for discharge: TCU vs home with assist   Rationale for recommendations: pending discussion with wife and level of assist she is able to provide, anticipate return home with 24 hr assist. If level of assist not available to pt, pt will likely require TCU stay as he is needing SBA-CGA for safe mobility at this time.        Entered by: Judy Chávez 09/15/2018 4:05 PM

## 2018-09-15 NOTE — PROCEDURES
Procedure Note    Attending: Jose Self  Resident: none  Procedure: Diagnostic and therapeutic paracentesis  Indication: evaluate for infection, distension      The risks and benefits of the procedure were explained to the patient's wife who expressed understanding and opted to proceed.  Consent was obtained and placed in the chart.  A time out was performed.  An area of ascites was located and marked using ultrasound guidance in the left lower quadrant; the area was prepped and draped in the usual sterile fashion.  8 ml of 1% lidocaine was instilled and ascites located.    The Slack paracentesis catheter and needle were inserted until ascites obtained then the needle removed.  The apparatus was connected to vacuum bottles and a total of 3000 ml removed.   A specimen was  sent for analysis. The catheter was withdrawn and the area dressed.    Patient tolerated the procedure well with no immediate complications.  The primary team was informed of the procedure.     Jose Rowland M.D.  Hospitalist  Internal Medicine and Pediatrics  [phone]  DOS:  9/15/18

## 2018-09-16 NOTE — PLAN OF CARE
Problem: Patient Care Overview  Goal: Plan of Care/Patient Progress Review  Outcome: Improving  Pt A&O x 3, AVSS on RA.  Pt continues to have difficulty finding words and expressing his thoughts.  This has improved since Friday night. Pt's mobility is improving, SBA to commode.  Calls appropriately for assistance.  No c/o pain or discomfort.  Slept quietly through the night. Plan for MRI today.  Continue to monitor, update physicians with any changes.

## 2018-09-16 NOTE — PROGRESS NOTES
Brief Neurology Progress Note:    No acute events overnight. On exam today the patient was awake, oriented to person, and place. Unable to state month or year. Able to name current president. Able to name and repeat. Difficulty with 2-3 step commands. Right homonymous hemianopia. Strength 5/5 throughout. Asterixis noted. EEG negative for seizure. It showed evidence of moderately severe diffuse nonspecific encephalopathy. Free phenytoin at 0.77. Since patient has been seizure free will continue at current dose of phenytoin at 120mg BID. Plan to follow up with Dr. English as outpatient for any further dose changes. The asterixis on examination is more indicative of a metabolic derangement which may be attributing to encephalopathy. It is reasonable to obtain MRI brain to rule out other etiologies of encephalopathy such as PRES, frontal lobe lesions. Neurology will sign off at this time. Will peripherally follow up results of MRI brain. Please call if any questions or concerns.    Patient discussed with Staff Dr. Teofilo Winters MD  Neurology PGY3      Patient discussed but not seen today.  Agree with plan of resident above.  Asterixis implies toxic/metabolic component to encephalopathy however MRI brain reasonable to rule out other sources if not responding to treatment.      Luis Red, DO  Neurology

## 2018-09-16 NOTE — PROGRESS NOTES
09/16/18 0900   Quick Adds   Type of Visit Initial Occupational Therapy Evaluation   Living Environment   Lives With spouse   Living Arrangements house  (DUPLEX)   Home Accessibility stairs to enter home;stairs within home;stairs (1 railing present);bed and bath are not on the first floor;grab bars present (bathtub);grab bars present (toilet)   Number of Stairs to Enter Home 8   Number of Stairs Within Home 17   Stair Railings at Home inside, present on left side;outside, present on right side   Transportation Available family or friend will provide   Living Environment Comment Pt reporting living in 3 story duplex with bed and bath upstairs.  Pt has railings and grab bars.  When wife entered wife reporting that house has 17 steps total with 2 landings and 1 railing.   Self-Care   Dominant Hand left   Usual Activity Tolerance moderate   Current Activity Tolerance fair   Regular Exercise no   Equipment Currently Used at Home cane, straight;grab bar;walker, rolling;shower chair   Activity/Exercise/Self-Care Comment Pt poor historian with wife correcting many  of pt's answers.  Pt reporting that he is ambidextrous, however wife reporting this not the case   Functional Level Prior   Ambulation 1-->assistive equipment   Transferring 1-->assistive equipment   Toileting 1-->assistive equipment   Bathing 3-->assistive equipment and person   Dressing 2-->assistive person   Eating 0-->independent   Communication 2-->difficulty speaking (not related to language barrier)   Swallowing 0-->swallows foods/liquids without difficulty   Cognition 1 - attention or memory deficits   Fall history within last six months yes   Number of times patient has fallen within last six months 6   Which of the above functional risks had a recent onset or change? ambulation;transferring;bathing;toileting;dressing;cognition   Prior Functional Level Comment Pt and wife having different reports of PLOF.  Pt reporting has not fallen within last 6  months, but wife said pt has fell 6+ times.  Wife assists with many ADLS   General Information   Onset of Illness/Injury or Date of Surgery - Date 09/14/18   Referring Physician Akanksha Self MD   Patient/Family Goals Statement return home   Additional Occupational Profile Info/Pertinent History of Current Problem Mono Varghese is a 47 year old male with with history of DM2, thrombocytopenia, cirrhosis c/b refractory ascites, HE, and rectal/esophageal varices, portal vein thrombosis not on anticoagulation who presented with two days of generalized weakness, confusion, and aphasia.    Precautions/Limitations fall precautions   Weight-Bearing Status - LUE full weight-bearing   Weight-Bearing Status - RUE full weight-bearing   Weight-Bearing Status - LLE full weight-bearing   Weight-Bearing Status - RLE full weight-bearing   General Info Comments Pt with very inconsistent reports of past medical hx and pt poor historian, as wife corrected many of his answers.  Pt with delayed processing speed and problem solving difficulties throughout evaluation.  Pt mildly irritated by questions.  Pt needing increased time for processing speed   Cognitive Status Examination   Orientation person;place;time   Level of Consciousness alert   Able to Follow Commands moderate impairment   Personal Safety (Cognitive) moderate impairment;decreased awareness, need for assist;decreased awareness, need for safety;decreased insight to deficits;unaware of cognitive deficits;unaware of functional deficits   Memory impaired   Attention Reports problems attending;Quiet environment required;Sustained attention impaired;Selective attention impaired, difficulty ignoring irrelevant stimuli;Alternating attention impaired, difficulty shifting between tasks;Divided attention impaired, difficulty with simultaneous tasks   Organization/Problem Solving Sequencing impaired;Problem solving impaired;Categorization of information impaired;Prioritizing of  information/tasks impaired   Executive Function Initiation impaired;Working memory impaired, decreased storage of information for performing tasks;Cognitive flexibility impaired;Planning ability impaired;Self awareness/monitoring impaired   Cognitive Comment reporting difficulty focusing   Visual Perception   Visual Perception Wears glasses   Visual Perception Comments bifocals   Sensory Examination   Sensory Comments BUE and BLE WNL   Integumentary/Edema   Integumentary/Edema Comments BUE WNL   Range of Motion (ROM)   ROM Comment BUE WNL   Strength   Strength Comments BUE WNL   Transfer Skill: Sit to Stand   Level of [place]: Sit/Stand contact guard   Physical Assist/Nonphysical Assist: Sit/Stand 1 person assist   Transfer Skill: Sit to Stand full weight-bearing   Assistive Device for Transfer: Sit/Stand standard walker   Grooming   Level of [place]: Grooming minimum assist (75% patients effort)   Physical Assist/Nonphysical Assist: Grooming set-up required   Instrumental Activities of Daily Living (IADL)   Previous Responsibilities meal prep   Activities of Daily Living Analysis   Impairments Contributing to Impaired Activities of Daily Living cognition impaired;strength decreased   General Therapy Interventions   Planned Therapy Interventions home program guidelines;progressive activity/exercise;strengthening;cognition   Clinical Impression   Criteria for Skilled Therapeutic Interventions Met yes, treatment indicated   OT Diagnosis pt with significant cognitive impairment with decreased activity tolerance, and decreased awareness to defcits leading to decreased safety and independence in ADLs and IADLs   Influenced by the following impairments motivation, cognition, insight   Assessment of Occupational Performance 3-5 Performance Deficits   Identified Performance Deficits dressing, bathing, grooming, household management, cognition   Clinical Decision Making (Complexity) Moderate complexity   Therapy  "Frequency 5 times/wk   Predicted Duration of Therapy Intervention (days/wks) 2 weeks   Anticipated Discharge Disposition Transitional Care Facility   Risks and Benefits of Treatment have been explained. Yes   Patient, Family & other staff in agreement with plan of care Yes   [facility] TM \"6 Clicks\"   2016, Trustees of [facility], under license to [employer].  All rights reserved.   6 Clicks Short Forms Daily Activity Inpatient Short Form   [facility]  \"6 Clicks\" Daily Activity Inpatient Short Form   1. Putting on and taking off regular lower body clothing? 3 - A Little   2. Bathing (including washing, rinsing, drying)? 3 - A Little   3. Toileting, which includes using toilet, bedpan or urinal? 3 - A Little   4. Putting on and taking off regular upper body clothing? 3 - A Little   5. Taking care of personal grooming such as brushing teeth? 3 - A Little   6. Eating meals? 4 - None   Daily Activity Raw Score (Score out of 24.Lower scores equate to lower levels of function) 19   Total Evaluation Time   Total Evaluation Time (Minutes) 15     "

## 2018-09-16 NOTE — PLAN OF CARE
Problem: Patient Care Overview  Goal: Plan of Care/Patient Progress Review  PT / 5B -     Discharge Planner PT   Patient plan for discharge: home with 24/7 supervision  Current status: Engaged in endurance training with ambulating in hallway with use of FWW + CGA for safety.  Performing lying>sitting and sit>stand transfers without additional assistance.  Barriers to return to prior living situation: stairs, LE strength/endurance  Recommendations for discharge: home with 24/7 supervision + HH PT/OT  Rationale for recommendations: Continued progression of strength and balance to reduce risk for falling.       Entered by: Anisa Denton 09/16/2018 3:26 PM

## 2018-09-16 NOTE — PLAN OF CARE
Problem: Patient Care Overview  Goal: Plan of Care/Patient Progress Review  Discharge Planner OT 5B  Patient plan for discharge: TCU  Current status: Evaluation completed, treatment initiated.  Completed [facility] cognitive screen, pt scoring 9/30 indicating severe dementia level cognitive impairment.  Pt having significant difficulties throughout session with slowed processing, decreased problem solving and initiation.  Pt having difficulties with visuospatial and executive functioning, attention, language, abstraction, delayed recall, and orientation.  Pt demonstrating decreased insight into cognitive and physical deficits and was poor historian.  Pt min A with supine to sit transfer, CGA with WW during sit to stand transfer and transfer to bathroom.  Pt CGA with WW during 100 ft of ambulation in halls.  Pt difficulties with initiation during grooming and hygiene while standing at sink.  Barriers to return to prior living situation: AMS, decreased insight into deficits, stairs, weakness  Recommendations for discharge: TCU  Rationale for recommendations: Pt with severe cognitive decline with decreased insight into deficits.  Pt demonstrating weakness and decreased activity tolerance leading to decreased independence and safety in ADLs and IADLs.       Entered by: Ale Hamilton 09/16/2018 3:23 PM

## 2018-09-16 NOTE — PLAN OF CARE
Problem: Patient Care Overview  Goal: Plan of Care/Patient Progress Review  Outcome: Improving  1530 to 1930:pt is disoriented to time and situation ,needs assist of one for transfer from chair to bedside commode,pt needs guidance and encouragement  with activities.denies pain and nausea.LS clear,BS+,abdomen distended ,pt had one loose BM.VSS,RA 92%.Will continue to monitor.    Problem: Cognitive Impairment (IRF) (Adult)  Goal: Cognitive Fxn [facility]  Outcome: No Change   09/16/18 1513   Cognitive Impairment (IRF) (Adult)   Cognitive Fxn [facility] progressing to functional independence

## 2018-09-16 NOTE — PLAN OF CARE
Problem: Patient Care Overview  Goal: Plan of Care/Patient Progress Review  Patient orientated to self and place unsteady with the situation. Patient had mri this afternoon. Working with PT and OT.  Patient up with sba and walker. Bed alarm needs to be on patient will get up on own to use commode. Patient hep locked for mri. Toe nails clipped per patient  Request. Patient takes pills whole. Drinking and eating well. Patient forgeful and forgets his limits. P;cont to monitor

## 2018-09-16 NOTE — PROGRESS NOTES
[facility], [place]    Internal Medicine Progress Note - Piedad 4 Service    Main Plans for Today     - Continue lactulose   - MRI of the brain per neurology recommendations today  - Continue current dosing of Phenytoin     Assessment & Plan     Mono Varghese is a 47 year old male with with history of DM2, thrombocytopenia, ETOH cirrhosis complicated by refractory ascites, HE, and rectal/esophageal varices, portal vein thrombosis, ADHD, GERD, Oligoastrocytoma of the parietal lobe, LENA presenting with two days of generalized weakness, confusion, and aphasia.       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  Will send RPR 9/16  - Paracentesis on 9/15 with no evidence of SBP  - Continue [place] protocol, goal 3-4 BMs every day  - Continue phenytoin at current dose per neurology recommendations.  Patient will follow up with Dr. English as outpatient for any further dose changes.  - MRI brain 9/16/18 per neurology recommendations   - continue thiamine supplementation      # ETOH cirrhosis   # Rectal and esophageal varices  # Refractory ascites with twice weekly paracentesis  # Portal vein thrombosis not on anticoagulation  # Hepatic encephalopathy  # Pancytopenia      - Plan for outpatient coiling of rectal varices   - Due to repeat endoscopy for banding of esophageal varices this month  - continue lactulose and rifaximin  - therapeutic paracentesis as needed   -  Hold Lasix 20 mg daily and spironolactone 50 mg daily 9/16/18, restart on 9/17/18 if renal function remains stable after MRI  - Cont omeprazole 40mg BID  - Cont Fe supplementaiton      # Hypothyroidism  # Major depression  # Insomnia   # Hyperlipidemia     - continue synthroid   - continue home mirtazapine 45mg QPM, gabapentin 100mg QPM, Ritalin 10mg BID, and Atarax 25mg BID  - continue home pravastatin and home trazodone     Diet: Regular Diet Adult  Fluids: none   Lines: Patient has a port  Munoz Catheter: not present    DVT Prophylaxis: Pneumatic Compression Devices  Code Status: Prior  Disposition Plan   Expected discharge: 2 - 3 days, recommended to prior living arrangement once once neurological workup is complete .     Entered: Akanksha Self MD 09/16/2018, 11:07 AM   Information in the above section will display in the discharge planner report.      The patient's care was discussed with the Bedside Nurse, Patient and Patient's Family.    Akanksha Self  [place] [id number]  Pager: [phone]  Please see sticky note for cross cover information    Interval History     Mr. Varghese is doing well today.  No fevers, chills, dyspnea, chest pain, abdominal pain, nausea, vomiting overnight.  No other overnight events.    Physical Exam   Vital Signs: Temp: 97.3  F (36.3  C) Temp src: Oral BP: 112/60 Pulse: 63 Heart Rate: 70 Resp: 16 SpO2: 100 % O2 Device: None (Room air)    Weight: 163 lbs 12.8 oz     General Appearance: Patient is in no acute distress  Eyes: Right homonymous hemianopia   Respiratory: Lungs are clear to auscultation, no wheezing, rales, or rhonchi auscultated  Cardiovascular: Regular rate and rhythm, no murmurs, no rubs, and no gallops   GI:  soft, not tender, not distended, bowel sounds present and normal, no masses appreciated, paracentesis site with no drainage or leading   Skin: no rashes and no discolorations   Neurologic: Patient is alert and oriented to person and place.  He  is not able to solve two step math problems, cannot tell my day of the weeks backwards (will not attempt), cannot tell me the president.  Psychiatric: normal mood and affect   Extremities: no cyanosis, no edema, and no clubbing, asterixis present

## 2018-09-17 NOTE — PROCEDURES
Procedure Date: 09/14/2018      EEG #:  -1      DATE OF RECORDING/SERVICE DATE:  09/14/2018      TYPE OF STUDY:  This is day 1 of 24-hour video EEG.      DURATION OF STUDY:  3 hours 41 minutes 22 seconds.      CLINICAL SUMMARY:  The patient is a 47-year-old male with a history of alcoholic liver cirrhosis, ascites, hepatic encephalopathy, type 2 diabetes, thrombocytopenia who presented with altered mental status.  EEG was performed to evaluate for seizures.      TECHNICAL SUMMARY: This continuous video- EEG monitoring procedure was performed with 23 scalp electrodes in 10-20 electrode system placement, and additional scalp, precordial and other surface electrodes used for electrical referencing and artifact detection.  Video monitoring was utilized and periodically reviewed by EEG technologists and the physician for electroclinical correlations.      FINDINGS:  No well-organized posterior dominant rhythm was appreciated.  Diffuse polymorphic 2-5 Hz activity was present throughout the recording.  There was an asymmetry with increased amplitude over the left hemisphere.  There were long periods of high-amplitude monomorphic 3-4 Hz delta activity which at times appeared sharply contoured, especially over the left temporal region.  During one of these, patient appeared to have difficulty talking.  This was noted at 20:35, but he was able to respond to the EEG technologist with head nodding.  Other times, there was no clear clinical manifestations during these episodes.  Deeper stages of sleep were manifested as high-amplitude 1-2 Hz delta slowing.  No epileptiform discharges were present.      IMPRESSION:  This is an abnormal video EEG due to the presence of moderately severe diffuse nonspecific encephalopathy.  There was a breach rhythm over the left hemisphere.  No epileptiform discharges were present.  There were periods with monomorphic delta activity over the left hemisphere and on one occasion, the patient  had difficulty talking.  These discharges did not evolve and at other times the patient did not have any clear clinical manifestation.  This Not entirely clear if this episode was a seizure or patient aphasia due to stroke or lesion in the left frontotemporal region.  Clinical correlation advised.         NIKOLE TAM MD             D: 09/15/2018   T: 09/15/2018   MT: NTS      Name:     SALO MADERA   MRN:      [id number]        Account:        [id number]   :      1970           Procedure Date: 2018      Document: [id number]

## 2018-09-17 NOTE — PLAN OF CARE
Problem: Patient Care Overview  Goal: Plan of Care/Patient Progress Review  PT - per plan established by the Physical Therapist, according to functional mobility the  discharge recommendation is home 24/7 supervision. Pt is close SBA for all bed mob and sit to stand with WW. Pt amb up to 300'x 1 with WW with flexed posture. No LOB. Pt demo seated there x program x 10, declined standing there  x and stair for this PT session.   Discharge Planner PT   Patient plan for discharge: home   Current status: see above   Barriers to return to prior living situation: weakness, fatigue.   Recommendations for discharge: home with 24/7 assist and home PT/OT to progress functional IND in own home   Rationale for recommendations: pt is progressing with general mobility, 24/7 support at home.        Entered by: Willis Verdin 09/17/2018 11:13 AM

## 2018-09-17 NOTE — PLAN OF CARE
"Problem: Patient Care Overview  Goal: Plan of Care/Patient Progress Review  /79 (BP Location: Right arm)  Pulse 63  Temp 97.7  F (36.5  C) (Oral)  Resp 16  Ht 1.778 m (5' 10\")  Wt 75.6 kg (166 lb 9.6 oz)  SpO2 92%  BMI 23.9 kg/m2  VSS on RA. Disoriented to year but able to answer all other orientation questions appropriately. Sat in chair most of evening. Take pills orally. [place] pain or nausea. Declined evening dose of lactulose. Port TKO. Will continue to monitor.     Problem: Cognitive Impairment (IRF) (Adult)  Goal: Cognitive Fxn [place]  Outcome: No Change   09/16/18 6776   Cognitive Impairment (IRF) (Adult)   Cognitive Fxn [place] demonstrating adequate progress         "

## 2018-09-17 NOTE — PLAN OF CARE
Problem: Patient Care Overview  Goal: Plan of Care/Patient Progress Review  Outcome: Improving  Pt with history of DM2, thrombocytopenia, ETOH cirrhosis complicated by refractory ascites, HE, and rectal/esophageal varices, portal vein thrombosis, ADHD, GERD, Oligoastrocytoma of the parietal lobe, LENA presenting with two days of generalized weakness, confusion, and aphasia. Currently A+O x3. Disoriented to time. PERRL. Able to follow commands and move all extremities spontaneously. VSS. Denies pain. No S/S of respiratory distress noted. No c/o nausea or vomiting. No stool overnight. Voids adequately. Bed alarm on for safety. Plan: see flow sheet for detailed assessments and interventions, continue to support POC.

## 2018-09-17 NOTE — PLAN OF CARE
Problem: Patient Care Overview  Goal: Plan of Care/Patient Progress Review  Patient up with sba to commode bed and chair alarm on for safety. toelraing his meals and drinking lisuids. His port was hep locked. Patient is a joker and has hard time finding words. Patient wife at bedside. P;cont to monitor

## 2018-09-17 NOTE — PROGRESS NOTES
Care Coordinator Progress Note    Admission Date/Time:  9/14/2018  Attending MD:  Akanksha Self MD    Data  Chart reviewed, discussed with interdisciplinary team.   Patient was admitted for: Hepatic encephalopathy (H).    Concerns with insurance coverage for discharge needs: None.  Current Living Situation: Patient lives with spouse.  Support System: Supportive and Involved  Services Involved: Home Care  Transportation at Discharge: Family or friend will provide  Transportation to Medical Appointments:   - Name of caregiver: WifeYisel  Barriers to Discharge: Medical stability    Coordination of Care and Referrals: Provided patient/family with options for Home Care.        Assessment  Patient is a 47 year old male with with history of DM2, thrombocytopenia, ETOH cirrhosis complicated by refractory ascites, HE, and rectal/esophageal varices, portal vein thrombosis, ADHD, GERD, Oligoastrocytoma of the parietal lobe, LENA presenting with two days of generalized weakness, confusion, and aphasia. Writer introduced self and role of RNCC. Patient and his wife, Yisel live in a [facility] in [place], [place]. Per patient's wife, she is able to care for him 24/7 at this time due to being on medical leave until 10/2, at that time, her mother will come to stay with the patient when she returns to work. Per patient and wife, they have a walker, cane, shower chair, and commode at home. Patient and wife are aware of OT recs for TCU, declining at this time and would like to go home. Patient and wife agreeable that discharge to home w/24hr supervision is the preferred safe discharge plan. Resumption orders for skilled nursing visits w/home care ([facility]) and HH PT/OT placed. RNCC to continue to follow for discharge planning needs.      Plan  Anticipated Discharge Date:  9/18  Anticipated Discharge Plan:  Home w/resumption of [facility], HH PT/OT, 24hr supervision by wife and/or family.    Yisel Prabhakar, SHUKRI,  [facility]    [facility]    Medicine Group  [address] [place] [id number]    [email or web address]    Office: [phone] Pager: [phone]  To contact weekend RNCC, dial [phone] and enter pager number [phone] at prompt. This pager can not be contacted by text page or outside line.

## 2018-09-17 NOTE — PROGRESS NOTES
[facility], [place]    Internal Medicine Progress Note - Piedad 4 Service    Main Plans for Today   - Restart Lasix 20 mg daily and spironolactone 50 mg daily  - Continue lactulose   - Per d/w neuro, AMS likely secondary to metabolic derangements in patient with multiple comorbidities in the setting of a prior brain surgery, possibly delirium contributing; per d/w neuro, prior brain surgery means that any change electrolytes or metabolism may cause his aphasia and vision to decline temporarily   Assessment & Plan     Mono Varghese is a 47 year old male with with history of DM2, thrombocytopenia, ETOH cirrhosis complicated by refractory ascites, HE, and rectal/esophageal varices, portal vein thrombosis, ADHD, GERD, Oligoastrocytoma of the parietal lobe, LENA presenting with two days of generalized weakness, confusion, and aphasia.       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   Per d/w neuro, AMS likely secondary to metabolic derangements in patient with multiple comorbidities in the setting of a prior brain surgery, possibly delirium contributing; per d/w neuro, prior brain surgery means that any change electrolytes or metabolism may cause his aphasia and vision to decline temporarily but do not expect his underlying mental status to worsen   - Thus far CT head and neck is negative for acute pathology   - Exam is not concerning for meningitis   - EEG 9/15/18 is not concerning for seizures  - Appreciate neurology recommendations   - infectious workup is thus far normal  - Vitamin B12 levels are normal, TSH is ok. RPR negative, hepatitis panel/HIV negative in 2015  - Paracentesis on 9/15 with no evidence of SBP  - Continue [place] protocol, goal 3-4 BMs every day  - Continue phenytoin at current dose per neurology recommendations.  Patient will follow up with Dr. English as outpatient for any further dose changes.  - MRI brain 9/16/18 negative for pathology that would explain acute encephalopathy   - continue thiamine supplementation      # ETOH cirrhosis   # Rectal and esophageal varices  # Refractory ascites with twice weekly paracentesis  # Portal vein thrombosis not on anticoagulation  # Hepatic encephalopathy  # Pancytopenia      - Plan for outpatient coiling of rectal varices   - Due to repeat endoscopy for banding of esophageal varices this month  - continue lactulose and rifaximin  - therapeutic paracentesis as needed   - Restart Lasix 20 mg daily and spironolactone 50 mg daily  - Cont omeprazole 40mg BID  - Cont Fe supplementaiton      # Hypothyroidism  # Major depression  # Insomnia   # Hyperlipidemia     - continue synthroid   - continue home mirtazapine 45mg QPM, gabapentin 100mg QPM, Ritalin 10mg BID, and Atarax 25mg BID  - continue home pravastatin and home trazodone     Diet: Regular Diet Adult  Fluids: none   Lines: Patient has a port  Munoz Catheter: not present    DVT Prophylaxis: Pneumatic Compression Devices  Code Status: Prior  Disposition Plan   Expected discharge: 2 - 3 days, recommended to prior living arrangement once neuro status closer to his baseline .     Entered: Maribell Bolden MD 09/17/2018, 6:24 AM   Information in the above section will display in the discharge planner report.      D/w Dr. Clair Bolden  [facility] 4  Pager: [phone]  Please see sticky note for cross cover information    Interval History   NAEO; patient w/ no complaints this AM; seems to wax and wane per chart review in terms of his orientation/mental status; MOCA 9/30 (bl 17/30)    Physical Exam   Vital Signs: Temp: 96.9  F (36.1  C) Temp src: Oral BP: 119/79 Pulse: 63 Heart Rate: 74 Resp: 20 SpO2:  93 % O2 Device: None (Room air)    Weight: 166 lbs 9.6 oz     General Appearance: NAD, on RA  Respiratory: CTA  Cardiovascular: RRR, no m/r/g  GI: soft, distended, non tender  Neurologic: oriented only to person; moving all extremities equally; no asterixis

## 2018-09-17 NOTE — PROGRESS NOTES
"CLINICAL NUTRITION SERVICES - ASSESSMENT NOTE     Nutrition Prescription    RECOMMENDATIONS FOR MDs/PROVIDERS TO ORDER:  Recommend Thera-vit daily  Calorie counts if patient stay > 48 hrs    Malnutrition Status:    Severe malnutrition in the context of acute on chronic illness    Recommendations already ordered by Registered Dietitian (RD):  Boost Plus between meals (chocolate flavor)    Future/Additional Recommendations:  1. If TF warranted recommend Peptamen 1.5 @ goal 60 ml/hr (1440 ml/day) to provide 2160 kcals, 98 g PRO, 1109 ml free H2O, 81 g Fat (70% from MCTs), 271 g CHO and no Fiber daily.  - 30 ml H2O flush q 4 hrs for tube patency  - monitor K+, Mg++, phos for TF advancement  2. Monitor oral intake of meals and supplements     REASON FOR ASSESSMENT  Mono Varghese is a/an 47 year old male assessed by the dietitian for Admission Nutrition Risk Screen for unintentional loss of 10# or more in the past two months    NUTRITION HISTORY  Per patient and patient's family member. Patient drinks instant carination breakfast at home BID. He has difficulty eating and has lost 60 lbs since December 2017. Per his family member he does not feel hungry.    Patient was last seen by out patient RD 5/6/16    CURRENT NUTRITION ORDERS  Diet: Regular  Intake/Tolerance: fair    LABS  Labs reviewed  (9/15): Ketones 10 mg/dL (A) - may indicate inadequate oral intake over the last few days    MEDICATIONS  Medications reviewed  Lactulose, vit D3, Oscal with D, Ferrous Sulfate    ANTHROPOMETRICS  Height: 177.8 cm (5' 10\")  Most Recent Weight: 75.6 kg (166 lb 9.6 oz)    IBW: 74 kg  BMI: Normal BMI  Weight History:   Wt Readings from Last 10 Encounters:   09/16/18 75.6 kg (166 lb 9.6 oz)   09/06/18 80.5 kg (177 lb 7.5 oz)   08/24/18 79.7 kg (175 lb 11.2 oz)   08/01/18 83.4 kg (183 lb 12.8 oz)   07/11/18 86.5 kg (190 lb 11.2 oz)   06/22/18 84.8 kg (187 lb)   05/30/18 88.6 kg (195 lb 5.2 oz)   05/25/18 86 kg (189 lb 11.2 oz) "   05/04/18 88.1 kg (194 lb 3.2 oz)   04/26/18 88.2 kg (194 lb 6.4 oz)   difficult to assess due to ascites with paracentesis. 14% decrease in 5 months likely some fluid and some actual weight  27% wt loss in 10 months  Dosing Weight: 76 kg (admit wt)     ASSESSED NUTRITION NEEDS  Estimated Energy Needs: 1879-4208 kcals/day (25 - 30 kcals/kg)  Justification: Maintenance  Estimated Protein Needs: 76-91 grams protein/day (1 - 1.2 grams of pro/kg)  Justification: Maintenance  Estimated Fluid Needs: 1 mL/kcal   Justification: Maintenance    PHYSICAL FINDINGS  See malnutrition section below.    MALNUTRITION  % Intake: </=75% for >/= 1 month (severe)  % Weight Loss: 14% in 5 months (severe)  Subcutaneous Fat Loss: Facial region, Upper arm and Thoracic/intercostal:  Moderate/severe  Muscle Loss: Thoracic region (clavicle, acromium bone, deltoid, trapezius, pectoral) and Dorsal hand:  Moderate/severe  Fluid Accumulation/Edema: None noted  Malnutrition Diagnosis: Severe malnutrition in the context of acute on chronic illness    NUTRITION DIAGNOSIS  Inadequate oral intake related to early satiety, decreased appetite as evidenced by self report and recent wt loss     INTERVENTIONS  Implementation  Nutrition Education: See education flowsheet   Medical food supplement therapy - see above    Goals  Patient to consume % of nutritionally adequate meal trays TID, or the equivalent with supplements/snacks.     Monitoring/Evaluation  Progress toward goals will be monitored and evaluated per protocol.    Lisa Lazo MS/RD/JOSELO/CNSC  5A/5B RD Pager: [phone]

## 2018-09-18 NOTE — PLAN OF CARE
Problem: Patient Care Overview  Goal: Plan of Care/Patient Progress Review  Occupational Therapy Discharge Summary    Reason for therapy discharge:    Discharged to home with home therapy.    Progress towards therapy goal(s). See goals on Care Plan in [facility] electronic health record for goal details.  Goals partially met.  Barriers to achieving goals:   discharge from facility.    Therapy recommendation(s):    Continued therapy is recommended.  Rationale/Recommendations:  Recommend continued skilled therapy to increase activity tolerance and independence with ADLs.

## 2018-09-18 NOTE — PLAN OF CARE
Problem: Patient Care Overview  Goal: Plan of Care/Patient Progress Review  Discharge Planner PT 5B  Patient plan for discharge: home  Current status: pt demonstrates improvement in activity tolerance this date, ambulating 2 x 250' with FWW and CGA, needs frequent cues for posture and maintaining FWW in safe proximity. Completes 2 x 4 stairs with BUE support on railings and CGA from therapist with gait belt donned. Pt often not receptive to therapist feedback and cues. No overt LOB noted however pt does demonstrate frequent safety concerns with shuffling steps, keeping FWW outside safe proximity, and minimally placing feet on stairs.   Barriers to return to prior living situation: safety concerns, cognition  Recommendations for discharge: home with 24/7 assistance/supervision and HHPT/OT   Rationale for recommendations: pt will benefit from home safety evaluations and continued therapy to improve balance and safety with mobility.        Entered by: De Magana 09/18/2018 10:55 AM         Physical Therapy Discharge Summary    Reason for therapy discharge:    Discharged to home with home therapy.    Progress towards therapy goal(s). See goals on Care Plan in [facility] electronic health record for goal details.  Goals partially met.  Barriers to achieving goals:   discharge from facility.    Therapy recommendation(s):    Continued therapy is recommended.  Rationale/Recommendations:  to improve safety with independent mobility, strength, balance and activity tolerance.

## 2018-09-18 NOTE — DISCHARGE SUMMARY
[facility], [place]    Internal Medicine Discharge Summary- [facility]    Date of Admission:  9/14/2018  Date of Discharge:  9/18/2018  1:10 PM  Discharging Attending Provider: Dr. Lynn  Discharge Team: P[facility] 4    Discharge Diagnoses   Metabolic encephalopathy due to upper respiratory infection in the setting of prior astrocytoma resection    Follow-ups Needed After Discharge   - Follow-up w/ PCP within 7 days   - Continue lactulose for goal 3-4 BMs/day   - F/u w/ outpatient neuologist Dr. English in November     Hospital Course   Mono Varghese is a 47 year old male with with history of DM2, thrombocytopenia, cirrhosis c/b refractory ascites, HE, and rectal/esophageal varices, portal vein thrombosis not on anticoagulation who presented with two days of generalized weakness, confusion, and aphasia, also endorsed several days of coughing.     Our initial differential included hepatic encephalopathy (ammonia was near his baseline but he did have asterixis on exam), seizures, structural abnormalities given his hx of astrocytoma, and infection. Given that he had no fevers or leukocytosis, we did not initiate abx. Diagnostic paracentesis on 9/15 showed 96 WBCs. He was started on the [place] protocol w/ lactulose. Neuro was consulted, and video EEG overnight on 9/14 was negative for seizures. CT head/neck angio w/ and w/o con on 9/14 was negative for pathology which would account for his sxs. MRI brain w/ and w/o con also did not reveal an etiology.    While on the [place] protocol, the patient steadily improved until he was back to his baseline per his wife. Per d/w neuro, AMS likely secondary to metabolic derangements (possibly from URI) in a patient with multiple comorbidities in the setting of a prior brain surgery- prior brain surgery means that any change in electrolytes or metabolism may worsen his baseline aphasia and poor cognition. Additionally, the patient would wax and  wane w/ regards to his orientation, so we think that delirium also was a contributing factor. He returned home w/ home care and 24/hr assistance from wife and mother-in-law w/ no changes to his medications.     Consultations This Hospital Stay   PHYSICAL THERAPY ADULT IP CONSULT  OCCUPATIONAL THERAPY ADULT IP CONSULT  NEUROLOGY GENERAL ADULT IP CONSULT  MEDICATION HISTORY IP PHARMACY CONSULT  PHYSICAL THERAPY ADULT IP CONSULT  OCCUPATIONAL THERAPY ADULT IP CONSULT  INTERNAL MEDICINE PROCEDURE TEAM ADULT IP CONSULT [facility] - [facility]     Code Status   DNR / DNI     The patient was discussed with Dr. Shane Bolden MD  [facility]  Pager: [phone]    Physician Attestation   I, Kaycee Lynn, saw and evaluated this patient prior to discharge.  I discussed the patient with the resident and agree with plan of care as documented in the resident note.      I personally reviewed vital signs, medications, labs and imaging.    I personally spent 35 minutes on discharge activities.    Kaycee Lynn  Date of Service (when I saw the patient): 9/18/18    ______________________________________________________________________    Physical Exam   Vital Signs:                    Weight: 170 lbs 4.8 oz    General Appearance: NAD, on RA  Respiratory: CTA  Cardiovascular: RRR, no m/r/g  GI: soft, distended, non tender  Neurologic: oriented only to person; moving all extremities equally; no asterixis     Significant Results and Procedures   Results for orders placed or performed during the hospital encounter of 09/14/18   CT Head Neck Angio w/o & w Contrast    Narrative    CTA HEAD NECK WITH CONTRAST 9/14/2018 12:18 PM    Head CT without contrast  CT angiogram of the neck   CT angiogram of the base of the brain with contrast  Reconstruction by the Radiologist on the 3D workstation    Provided History:  Evaluate for dissection/thromboembolism;     Comparison:  5/3/2018.      Technique:  HEAD CT:  Using multidetector  thin collimation helical acquisition  technique, axial, coronal and sagittal CT images from the skull base  to the vertex were obtained without intravenous contrast.   HEAD and NECK CTA: During rapid bolus intravenous injection of  nonionic contrast material, axial images were obtained using thin  collimation multidetector helical technique from the base of the skull  through the [place] of [place]. This CT angiogram data was reconstructed  at thin intervals with mild overlap. Images were sent to the [employer]  workstation, and 3D reconstructions were obtained. The axial source  images, multiplanar reformations, 3D reconstructions in both maximum  intensity projection display and volume rendered models were reviewed,  with reconstructions performed by the technologist and the  radiologist.    Contrast: 115cc of Isovue 370    Findings:  Head CT: There is no evidence of intracranial hemorrhage, mass effect,  or midline shift. Unchanged left parietal resection cavity and  craniotomy with associated ex vacuo dilatation of the left lateral  ventricle. Gray/white matter differentiation in both cerebral  hemispheres is otherwise preserved. There is mild patchy low  attenuation within the periventricular and supraventricular white  matter of both cerebral hemispheres, nonspecific but most likely  representing chronic small vessel ischemic disease given the patient's  age. Ventricles are proportionate to the cerebral sulci.     Mild dependent left mastoid effusion, unchanged.    Head CTA demonstrates no aneurysm or stenosis of the major  intracranial arteries. The anterior communicating artery is patent.  The posterior communicating arteries are patent bilaterally.     Neck CTA demonstrates patent great vessel origins from the aortic  arch. Normal variant common origin of the right innominate and left  common carotid arteries. Atherosclerotic plaque with focal short  segment narrowing of the proximal right ICA with residual  lumen  diameter of 4.1 mm and normal distal diameter of 4.8 mm, corresponding  to 10-20% diameter stenosis. Normal left ICA measures 4.9 mm.  Atherosclerotic plaque at the left carotid bifurcation without  associated stenosis.    No mass is noted within the visualized portions of the cervical soft  tissues or lung apices.       Impression    Impression:    1. Head CTA demonstrates no aneurysm or stenosis of the major  intracranial arteries.   2. Neck CTA demonstrates 10-20% diameter short segment stenosis of the  proximal right ICA. No left ICA or vertebral artery stenosis. No  evidence for arterial dissection.  3. No evidence of intracranial hemorrhage on the noncontrast head CT.     4. Unchanged appearance of the left parietal tumor resection cavity.    I have personally reviewed the examination and initial interpretation  and I agree with the findings.    KARLI JIMENEZ MD   XR Chest 2 Views    Narrative    XR CHEST 2 VW  9/14/2018 6:19 PM      HISTORY: cough with concern for pneumonia;     COMPARISON: 5/3/2018    FINDINGS: PA and lateral views of the chest. Right chest wall  Port-A-Cath with tip in the right atrium. No acute airspace opacities.  No pneumothorax or pleural effusion. Heart size is normal. Bibasilar  discoid atelectasis.      Impression    IMPRESSION: New bibasilar discoid atelectasis..    I have personally reviewed the examination and initial interpretation  and I agree with the findings.    MIRELLA DURÁN MD   MR Brain w/o & w Contrast    Narrative     MR BRAIN W/O & W CONTRAST 9/16/2018 1:14 PM    Provided History: patient w/ hx of brain tumor s/p resection and brain  surgery presenting w/ altered mental status    Comparison: Brain MRI 5/3/2018 3/1/2017, 2/29/2016, 12/14/2015,  12/20/2014    Technique: Multiplanar T1-weighted, axial FLAIR, and susceptibility  images were obtained without intravenous contrast. Following  intravenous gadolinium-based contrast administration, axial  T2-weighted,  diffusion, and T1-weighted images (in multiple planes)  were obtained.    Contrast: 7.4CC GADAVIST     Findings:    Stable postsurgical changes of left parietal craniotomy with  underlying resection cavity and reactive dural thickening/enhancement.    Stable T2 hyperintensity surrounding the resection cavity dating back  to 12/20/2014.     No new suspicious nodular intracranial enhancement.    Stable confluent periventricular T2 hyperintense signal within the  posterior left frontal lobe and right periatrial white matter and  scattered cerebral punctate microhemorrhages are unchanged since  2/29/2016, likely posttreatment related.    Stable ex vacuo dilatation of the posterior left lateral ventricle.  Ventricles are not dilated out of proportion to the cerebral sulci.  Stable mild generalized parenchymal volume loss. No abnormal foci of  restricted diffusion. Paranasal sinuses are clear. Left mastoid  effusion. Orbits are unremarkable.      Impression    Impression:  1. No findings to explain underlying change in mental status.  2. Stable T2 hyperintense signal abnormality surrounding the left  parietal resection cavity. No evidence of tumor  recurrence/progression.    I have personally reviewed the examination and initial interpretation  and I agree with the findings.    SARAH YING MD     *Note: Due to a large number of results and/or encounters for the requested time period, some results have not been displayed. A complete set of results can be found in Results Review.       Pending Results   N/a       Primary Care Physician   [place]    Discharge Disposition   Discharged to home  Condition at discharge: Stable    Discharge Orders     Home care nursing referral     Home Care PT Referral for Hospital Discharge     Physical Therapy Referral     Occupational Therapy Referral     MD face to face encounter   Documentation of Face to Face and Certification for Home Health Services    I certify that  patient: Mono Varghese is under my care and that I, or a nurse practitioner or physician's assistant working with me, had a face-to-face encounter that meets the physician face-to-face encounter requirements with this patient on: 9/17/2018.    This encounter with the patient was in whole, or in part, for the following medical condition, which is the primary reason for home health care: hepatic encephalopathy.    I certify that, based on my findings, the following services are medically necessary home health services: Nursing, Occupational Therapy and Physical Therapy.    My clinical findings support the need for the above services because: Nurse is needed: To assess strength and cogntion after changes in medications or other medical regimen , Occupational Therapy Services are needed to assess and treat cognitive ability and address ADL safety due to impairment in cognition and strength. and Physical Therapy Services are needed to assess and treat the following functional impairments: strength and balance.    Further, I certify that my clinical findings support that this patient is homebound (i.e. absences from home require considerable and taxing effort and are for medical reasons or Mandaen services or infrequently or of short duration when for other reasons) because: Requires assistance of another person or specialized equipment to access medical services because patient: Requires supervision of another for safe transfer.    Based on the above findings. I certify that this patient is confined to the home and needs intermittent skilled nursing care, physical therapy and/or speech therapy.  The patient is under my care, and I have initiated the establishment of the plan of care.  This patient will be followed by a physician who will periodically review the plan of care.  Physician/Provider to provide follow up care: King Louis    Attending hospital physician (the Medicare certified DENEEN provider):  Akanksha Self MD  Physician Signature: See electronic signature associated with these discharge orders.  Date: 9/17/2018     Reason for your hospital stay   You were admitted with increased confusion, weakness, cough, and difficulty speaking. We did brain MRI and CT, an infectious work-up to look for pneumonia, UTI, or SBP, checked your thyroid and vitamin levels, and got an EEG to look for seizures. 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 We also think that there was some amount of delirium (intermittent confusion) which is common in patients who are hospitalized.     Adult [facility] Follow-up and recommended labs and tests   Follow up with primary care provider, King Louis, within 7 days for hospital follow- up.  No follow up labs or test are needed.      Appointments on [facility] and/or [facility] (with [facility] or [facility] provider or service). Call [phone] if you haven't heard regarding these appointments within 7 days of discharge.     Activity   Your activity upon discharge: activity as tolerated     Discharge Instructions   Continue taking lactulose with the goal of 3-4 bowel movements per day  Continue to follow with your regular neurologist, neurosurgeon and gastroenterologist   Home PT/OT ordered     Diet   Follow this diet upon discharge: 2g sodium restriction       Discharge Medications   Discharge Medication List as of 9/18/2018 12:24 PM      CONTINUE these medications which have NOT CHANGED    Details   Calcium Carb-Cholecalciferol (CALCIUM 500 +D) 500-400 MG-UNIT TABS Take 500 mg by mouth daily, Disp-90 tablet, R-1, E-Prescribe      ciclopirox (LOPROX) 0.77 % cream Apply topically 2 times daily To  feet and toenails.Disp-90 g, [id number]-Osrdeffis      cyanocobalamin 1000 MCG TABS Take 1 tablet by mouth daily, Historical      dulaglutide (TRULICITY) 1.5 MG/0.5ML pen Inject 1.5 mg Subcutaneous every 7 days, Disp-6 mL, R-1, E-Prescribe6 MOS SUPPLY      ferrous sulfate (IRON) 325 (65 FE) MG tablet Take 1 tablet (325 mg) by mouth 2 times daily, Disp-200 tablet, R-3, E-Prescribe      furosemide (LASIX) 20 MG tablet Take 1 tablet (20 mg) by mouth daily, Disp-30 tablet, R-3, E-Prescribe      gabapentin (NEURONTIN) 100 MG capsule Take 1 capsule (100 mg) by mouth At Bedtime, Disp-30 capsule, R-3, E-Prescribe      hydrOXYzine (ATARAX) 25 MG tablet Take 1 tablet (25 mg) by mouth 2 times daily, Disp-180 tablet, R-1, E-Prescribe      lactulose (CHRONULAC) 10 GM/15ML solution Take 30 mLs (20 g) by mouth 3 times daily, Disp-946 mL, R-3, E-PrescribeTitrate lactulose to 3-4 bowel movements per day      levothyroxine (SYNTHROID/LEVOTHROID) 88 MCG tablet Take 1 tablet (88 mcg) by mouth daily, Disp-90 tablet, R-1, E-Prescribe      methylphenidate (RITALIN) 10 MG tablet Take 1 tablet (10 mg) by mouth 2 times daily, Disp-60 tablet, R-0, Local Print      mirtazapine (REMERON) 45 MG tablet Take 1 tablet (45 mg) by mouth At Bedtime, Disp-30 tablet, R-2, E-Prescribe      multivitamin, therapeutic with minerals (THERA-VIT-M) TABS Take 1 tablet by mouth 2 times daily, Historical      omeprazole (PRILOSEC) 20 MG CR capsule Take 2 capsules (40 mg) by mouth 2 times daily, Disp-360 capsule, R-3, E-Prescribe      phenytoin (DILANTIN) 30 MG CR capsule Take 4 capsules (120 mg) by mouth 2 times daily, Disp-720 capsule, R-11, E-Prescribe      pravastatin (PRAVACHOL) 80 MG tablet Take 1 tablet (80 mg) by mouth daily, Disp-90 tablet, R-3, E-Prescribe      spironolactone (ALDACTONE) 25 MG tablet Take 2 tablets (50 mg) by mouth daily, Disp-180 tablet, R-1, E-Prescribe      thiamine (VITAMIN B-1) 100 MG tablet Take 1 tablet (100 mg) by mouth daily, Disp-100  tablet, R-1, E-Prescribe      traZODone (DESYREL) 50 MG tablet Take 1 tablet (50 mg) by mouth nightly as needed for sleep, Disp-30 tablet, R-2, E-Prescribe      Vitamin D, Cholecalciferol, 1000 units TABS Take 1 tablet by mouth daily, Historical      XIFAXAN 550 MG TABS tablet TAKE ONE TABLET BY MOUTH TWICE DAILY, Disp-60 tablet, R-11, E-PrescribePlease consider 90 day supplies to promote better adherence      blood glucose monitoring (ONETOUCH ULTRA) test strip Use to test blood sugars 4 times daily as needed or as directed., Disp-400 each, R-1, E-Prescribe      CANE, ANY MATERIAL One cane, Disp-1 each, R-0, Local Print      HYDROcodone-acetaminophen (NORCO) 5-325 MG per tablet Take 2 tablets by mouth every 6 hours as needed for moderate to severe pain, Disp-60 tablet, R-0, Local Print      lidocaine (LMX4) 4 % CREA cream Apply topically once as needed for mild painDisp-133 g, [id number]      ONE TOUCH LANCETS MISC by In Vitro route 4 times daily as needed, Disp-100 each, R-prn, FaxAs of January 1 pt's insurance will only cover onetouch or accu check.  Patient needs a  new glucometer (one touch), too           Allergies   Allergies   Allergen Reactions     Latex Itching and Rash     No Clinical Screening - See Comments      Coban and Surgilast cause itching     Tegaderm Transparent Dressing (Informational Only)

## 2018-09-18 NOTE — PROGRESS NOTES
[facility]  Patient is currently open to home care services with [facility].  The patient is currently receiving RN/PT services.  Critical access hospital  and team have been notified of patient admission.  Critical access hospital liaison will continue to follow patient during stay.  If appropriate provide orders to resume home care at time of discharge.    Thank you  Sindi Gagnon RN, BSN  [facility] Liaison  [facility]  [phone]

## 2018-09-18 NOTE — PROGRESS NOTES
clifford is his care coordinator through insurance. Past the message to wife travis to call when she comes in

## 2018-09-18 NOTE — PLAN OF CARE
Problem: Patient Care Overview  Goal: Plan of Care/Patient Progress Review  Outcome: Improving  Pt opens eyes spontaneously, oriented in all spheres. Vitally stable on room air. Eating and drinking well, voiding without difficulty. 4x loose brown stools on this shift, 3x with incontinence. Port hep locked. Continues to struggle with word finding at times. Wife at bedside for much of the shift. Frustrated at times, refused evening dose of lactulose 2/2 frequency of loose stools. Bed alarm and chair alarm on as applicable at all times for safety. Continue to monitor closely.

## 2018-09-18 NOTE — PROGRESS NOTES
[facility] called regarding plans for discharge per nurse that called they picked him up and not have dc plans yet. Will let care coordinater in house know

## 2018-09-19 NOTE — PROGRESS NOTES
[facility]: Post-Discharge Note  SITUATION                                                      Admission:    Admission Date: 09/14/18   Reason for Admission: Encephalopathy  Discharge:   Discharge Date: 09/18/18  Discharge Diagnosis: Encephalopathy  Discharge Service: Internal Medicine     BACKGROUND                                                      Mono Varghese is a 47 year old male with with history of DM2, thrombocytopenia, ETOH cirrhosis complicated by refractory ascites, HE, and rectal/esophageal varices, portal vein thrombosis, ADHD, GERD, Oligoastrocytoma of the parietal lobe, LENA presenting with two days of generalized weakness, confusion, and aphasia.     ASSESSMENT      Discharge Assessment  Patient reports symptoms are: Improved  Does the patient have all of their medications?: Yes  Does patient know what their new medications are for?: Yes  Does paient have a follow-up appointment scheduled?: Yes  Does patient have any other questions or concerns?: No    Post-op  Did the patient have surgery or a procedure: No    PLAN                                                      Outpatient Plan:      Follow up with primary care provider, [place], within 7 days for hospital follow- up.  No follow up labs or test are needed.      Continue taking lactulose with the goal of 3-4 bowel movements per day  Continue to follow with your regular neurologist, neurosurgeon and gastroenterologist   Home PT/OT ordered      Future Appointments  Date Time Provider Department Center   9/26/2018 1:50 PM King Louis MD [facility]   9/26/2018 3:20 PM King Louis MD [facility]   10/2/2018 10:00 AM Leroy Alcaraz MD URPSY [facility]   10/2/2018 1:30 PM Anil English MD [facility]   11/21/2018 10:45 AM [address]   2/13/2019 9:00 AM  [facility]   2/13/2019 10:00 AM Beatriz Tanner MD [facility]   4/26/2019 10:00 AM [address]    5/3/2019 10:45 AM Lauro Shaw MD [facility]           Sully Walter, CMA

## 2018-09-20 NOTE — PROGRESS NOTES
[facility] now requests orders and shares plan of care/discharge summaries for some patients through [employer].  Please REPLY TO THIS MESSAGE OR ROUTE BACK TO THE AUTHOR in order to give authorization for orders when needed.  This is considered a verbal order, you will still receive a faxed copy of orders for signature.  Thank you for your assistance in improving collaboration for our patients.    ORDER  SN 2 wk 4, 1 wk 5, 3 PRN  PT 1 day 1  OT 1 day 1  ST 1 day 1  HA 1 wk 1, 2 wk 8    MD SUMMARY/PLAN OF CARE    Patient just discharged from hospital for Hepatic Encephalopathy. Patient very unsteady and having difficulty with word finding when talking

## 2018-09-21 NOTE — PROGRESS NOTES
Called an spoke to wife- she states they have their instructions for prep and are all set for upcoming procedure.   - clear liquids the day before and 2 fleets enemas the morning of procedure.    Shalini GERMAN RN Coordinator  Dr. Aragon, Dr. Dickens & Dr. Kelly   [facility]  [phone]

## 2018-09-24 NOTE — PROGRESS NOTES
[facility] now requests orders and shares plan of care/discharge summaries for some patients through [employer].  Please REPLY TO THIS MESSAGE OR ROUTE BACK TO THE AUTHOR in order to give authorization for orders when needed.  This is considered a verbal order, you will still receive a faxed copy of orders for signature.  Thank you for your assistance in improving collaboration for our patients.    ORDER    PT eval completed today and home exercise program initiated. Requesting okay for PT 1xwx3 weeks for therapeutic exercises, gait training, and instruction in HEP.     Thank you,  Jihan Mckeon, PT

## 2018-09-24 NOTE — PROGRESS NOTES
[facility] now requests orders and shares plan of care/discharge summaries for some patients through [employer].  Please REPLY TO THIS MESSAGE OR ROUTE BACK TO THE AUTHOR in order to give authorization for orders when needed.  This is considered a verbal order, you will still receive a faxed copy of orders for signature.  Thank you for your assistance in improving collaboration for our patients.    Writer was out to see patient today and noted that when the nurse was out on Friday 9/21 and completed medication set up in pill boxes she only dosed pt 1 capsule of Dilantin AM and PM. Pt is to be on 4 capsules AM and PM. Pt states he did not notice how many capsules but nurse had also set up back up days and those back up days only had 1 capsule AM and PM.    Pill box reconciled to ensure accuracy and updated as needed per med list. I am not sure if you would like us to check a Dilantin level or not. If so please let me know and it can be drawn Thursday this week.    Thanks,   Meghann Snowden RN  Admission Clinician  [facility]  [phone]  [email or web address]

## 2018-09-26 NOTE — MR AVS SNAPSHOT
After Visit Summary   9/26/2018    Mono Varghese    MRN: [id number]           Patient Information     Date Of Birth          1970        Visit Information        Provider Department      9/26/2018 1:50 PM King Louis MD [facility]        Today's Diagnoses     Long Q-T syndrome    -  1    Hypothyroidism, unspecified type        Liver disease, chronic, due to alcohol (H)          Care Instructions    Primary Care Center Medication Refill Request Information:  * Please contact your pharmacy regarding ANY request for medication refills.  ** [facility] Prescription Fax = [phone]  * Please allow 3 business days for routine medication refills.  * Please allow 5 business days for controlled substance medication refills.     Primary Care Center Test Result notification information:  *You will be notified with in 7-10 days of your appointment day regarding the results of your test.  If you are on MyChart you will be notified as soon as the provider has reviewed the results and signed off on them.    [facility]: [phone]               Follow-ups after your visit        Follow-up notes from your care team     Return in about 1 month (around 10/26/2018).      Your next 10 appointments already scheduled     Oct 01, 2018   Procedure with Guru Jose Kelly MD   [facility], [place], Same Day Surgery (--)    [address] [facility] [id number] [phone]            Oct 02, 2018 10:00 AM CDT   Adult Med Follow UP with Leroy Alcaraz MD   Psychiatry Clinic ([facility])    [address]  [address] [id number] [phone]            Oct 02, 2018  1:30 PM CDT   (Arrive by 1:15 PM)   Return Seizure with Anil English MD   [facility] ([facility])    [address]  [address]rd [facility] [place] [id number] [phone]            Nov 21, 2018 10:45 AM CST   MR ABDOMEN W/O  & W CONTRAST with UCMR1   [facility] ([facility] [place])    [address] [address]  1st Floor  [facility] [address] [phone]           How do I prepare for my exam? (Food and drink instructions) Do not eat or drink for 6 hours prior to exam. **If you will be receiving sedation or general anesthesia, please see special notes below.**  How do I prepare for my exam? (Other instructions) Take your medicines as usual, unless your doctor tells you not to. You may or may not receive IV contrast for this exam pending the discretion of the Radiologist.  **If you will be receiving sedation or general anesthesia, please see special notes below.**  What should I wear: The MRI machine uses a strong magnet. Please wear clothes without metal (snaps, zippers). A sweatsuit works well, or we may give you a hospital gown. Please remove any body piercings and hair extensions before you arrive. You will also remove watches, jewelry, hairpins, wallets, dentures, partial dental plates and hearing aids. You may wear contact lenses, and you may be able to wear your rings. We have a safe place to keep your personal items, but it is safer to leave them at home.  How long does the exam take: Most tests take 30 to 60 minutes.  HOWEVER, IF YOUR DOCTOR PRESCRIBES ANESTHESIA please plan on spending four to five hours in the recovery room.  What should I bring: Bring a list of your current medicines to your exam (including vitamins, minerals and over-the-counter drugs). Also bring the results of similar scans you may have had.  Do I need a : **If you will be receiving sedation or general anesthesia, please see special notes below.**  What should I do after the exam: No Restrictions, You may resume normal activities.  What is this test: MRI (magnetic resonance imaging) uses a strong magnet and radio waves to look inside the body. An MRA (magnetic resonance angiogram) does the same thing, but it lets  us look at your blood vessels. A computer turns the radio waves into pictures showing cross sections of the body, much like slices of bread. This helps us see any problems more clearly. You may receive fluid (called  contrast ) before or during your scan. The fluid helps us see the pictures better. We give the fluid through an IV (small needle in your arm).  Who should I call with questions: If you have any questions, please contact your Imaging Department exam site. Directions, parking instructions, and other information is available on our website, [email or web address].            Feb 13, 2019  9:00 AM CST   Lab with  LAB   [facility] ([facility])    [address] [facility]  1st Floor  [facility] [address] [phone]            Feb 13, 2019 10:00 AM CST   (Arrive by 9:45 AM)   Return General Liver with Beatriz Tanner MD   [facility] ([facility])    [address] [place] [address]  [address]  [facility] [address] [phone]            Apr 26, 2019 10:00 AM CDT   MR BRAIN W/O & W CONTRAST with [address] MRI ([facility])    [address] [facility]  1st [facility] [address] [phone]           How do I prepare for my exam? (Food and drink instructions) **If you will be receiving sedation or general anesthesia, please see special notes below.**  How do I prepare for my exam? (Other instructions) Take your medicines as usual, unless your doctor tells you not to. You may or may not receive intravenous (IV) contrast for this exam pending the discretion of the Radiologist.  You do not need to do anything special to prepare.  **If you will be receiving sedation or general anesthesia, please see special notes below.**  What should I wear: The MRI machine uses a strong magnet. Please wear clothes without metal (snaps, zippers). A sweatsuit works well, or we may give you a hospital gown.  Please remove any body piercings and hair extensions before you arrive. You will also remove watches, jewelry, hairpins, wallets, dentures, partial dental plates and hearing aids. You may wear contact lenses, and you may be able to wear your rings. We have a safe place to keep your personal items, but it is safer to leave them at home.  How long does the exam take: Most tests take 30 to 60 minutes.  HOWEVER, IF YOUR DOCTOR PRESCRIBES ANESTHESIA please plan on spending four to five hours in the recovery room.  What should I bring:  Bring a list of your current medicines to your exam (including vitamins, minerals and over-the-counter drugs).  Do I need a :  **If you will be receiving sedation or general anesthesia, please see special notes below.**  What should I do after the exam: No Restrictions, You may resume normal activities.  What is this test: MRI (magnetic resonance imaging) uses a strong magnet and radio waves to look inside the body. An MRA (magnetic resonance angiogram) does the same thing, but it lets us look at your blood vessels. A computer turns the radio waves into pictures showing cross sections of the body, much like slices of bread. This helps us see any problems more clearly. You may receive fluid (called  contrast ) before or during your scan. The fluid helps us see the pictures better. We give the fluid through an IV (small needle in your arm).  Who should I call with questions:  Please call the Imaging Department at your exam site with any questions. Directions, parking instructions, and other information is available on our website, [email or web address].  How do I prepare if I m having sedation or anesthesia? **IMPORTANT** THE INSTRUCTIONS BELOW ARE ONLY FOR THOSE PATIENTS WHO HAVE BEEN TOLD THEY WILL RECEIVE SEDATION OR GENERAL ANESTHESIA DURING THEIR MRI PROCEDURE:  IF YOU WILL RECEIVE SEDATION (take medicine to help you relax during your exam): You must get the medicine from your  doctor before you arrive. Bring the medicine to the exam. Do not take it at home. Arrive one hour early. Bring someone who can take you home after the test. Your medicine will make you sleepy. After the exam, you may not drive, take a bus or take a taxi by yourself. No eating 8 hours before your exam. You may have clear liquids up until 4 hours before your exam. (Clear liquids include water, clear tea, black coffee and fruit juice without pulp.)  IF YOU WILL RECEIVE ANESTHESIA (be asleep for your exam): Arrive 1 1/2 hours early. Bring someone who can take you home after the test. You may not drive, take a bus or take a taxi by yourself. No eating 8 hours before your exam. You may have clear liquids up until 4 hours before your exam. (Clear liquids include water, clear tea, black coffee and fruit juice without pulp.)            May 03, 2019 10:45 AM CDT   (Arrive by 10:30 AM)   Return Visit with Lauro Shaw MD   [facility] ([facility])    [address] [place] [address]  [address] [place] [address] [phone]              Future tests that were ordered for you today     Open Future Orders        Priority Expected Expires Ordered    CBC with platelets differential Routine 9/26/2018 10/10/2018 9/26/2018    Comprehensive metabolic panel Routine 9/26/2018 10/10/2018 9/26/2018    INR Routine 9/26/2018 9/26/2019 9/26/2018    TSH with free T4 reflex Routine 9/26/2018 10/10/2018 9/26/2018            Who to contact     Please call your clinic at [phone] to:    Ask questions about your health    Make or cancel appointments    Discuss your medicines    Learn about your test results    Speak to your doctor            Additional Information About Your Visit        [employer] Information     [employer] gives you secure access to your electronic health record. If you see a primary care provider, you can also send messages to your care team and make appointments. If you have questions,  please call your primary care clinic.  If you do not have a primary care provider, please call [phone] and they will assist you.      [employer] is an electronic gateway that provides easy, online access to your medical records. With [employer], you can request a clinic appointment, read your test results, renew a prescription or communicate with your care team.     To access your existing account, please contact your [facility] or call [phone] for assistance.        Care EveryWhere ID     This is your Care EveryWhere ID. This could be used by other organizations to access your [facility] medical records  [id number]        Your Vitals Were     Pulse Pulse Oximetry                79 95%           Blood Pressure from Last 3 Encounters:   09/26/18 109/72   09/18/18 101/60   09/06/18 (!) 109/94    Weight from Last 3 Encounters:   09/17/18 77.2 kg (170 lb 4.8 oz)   09/06/18 80.5 kg (177 lb 7.5 oz)   08/24/18 79.7 kg (175 lb 11.2 oz)              We Performed the Following     EKG Performed in Clinic w/ Provider Reading Fee     FLU VACCINE, AGE >= 3 YR          Today's Medication Changes          These changes are accurate as of 9/26/18  4:11 PM.  If you have any questions, ask your nurse or doctor.               Stop taking these medicines if you haven't already. Please contact your care team if you have questions.     dulaglutide 1.5 MG/0.5ML pen   Commonly known as:  TRULICITY   Stopped by:  King Louis MD                    Primary Care Provider Office Phone # Fax #    King Louis [phone]       [address] [address]        Equal Access to Services     [facility]: Hadii aad reji muse Sojanna, waaxda luqadaha, qaybta kaalmada dorotheayada, josue roque . So [facility] [phone].    ATENCIÓN: Si habla español, tiene a chiang disposición servicios gratuitos de asistencia lingüística. Llame al  [phone].    We comply with applicable federal civil rights laws and [place] laws. We do not discriminate on the basis of race, color, national origin, age, disability, sex, sexual orientation, or gender identity.            Thank you!     Thank you for choosing [facility]  for your care. Our goal is always to provide you with excellent care. Hearing back from our patients is one way we can continue to improve our services. Please take a few minutes to complete the written survey that you may receive in the mail after your visit with us. Thank you!             Your Updated Medication List - Protect others around you: Learn how to safely use, store and throw away your medicines at www.disposemymeds.org.          This list is accurate as of 9/26/18  4:11 PM.  Always use your most recent med list.                   Brand Name Dispense Instructions for use Diagnosis    blood glucose monitoring test strip    ONETOUCH ULTRA    400 each    Use to test blood sugars 4 times daily as needed or as directed.    Diabetes mellitus, type 2 (H)       Calcium Carb-Cholecalciferol 500-400 MG-UNIT Tabs    calcium 500 +D    90 tablet    Take 500 mg by mouth daily    Malnutrition (H)       CANE, ANY MATERIAL     1 each    One cane    Balance disorder       ciclopirox 0.77 % cream    LOPROX    90 g    Apply topically 2 times daily To feet and toenails.    Tinea pedis of both feet       cyanocobalamin 1000 MCG Tabs      Take 1 tablet by mouth daily        ferrous sulfate 325 (65 Fe) MG tablet    IRON    200 tablet    Take 1 tablet (325 mg) by mouth 2 times daily    Other iron deficiency anemia       furosemide 20 MG tablet    LASIX    30 tablet    Take 1 tablet (20 mg) by mouth daily    Other ascites       gabapentin 100 MG capsule    NEURONTIN    30 capsule    Take 1 capsule (100 mg) by mouth At Bedtime    Mild episode of recurrent major depressive disorder (H)       HYDROcodone-acetaminophen 5-325 MG per tablet     NORCO    60 tablet    Take 2 tablets by mouth every 6 hours as needed for moderate to severe pain    Brain tumor (H)       hydrOXYzine 25 MG tablet    ATARAX    180 tablet    Take 1 tablet (25 mg) by mouth 2 times daily    Itching, Anxiety       lactulose 10 GM/15ML solution    CHRONULAC    946 mL    Take 30 mLs (20 g) by mouth 3 times daily    Hepatic encephalopathy (H)       levothyroxine 88 MCG tablet    SYNTHROID/LEVOTHROID    90 tablet    Take 1 tablet (88 mcg) by mouth daily    Hypothyroidism       lidocaine 4 % Crea cream    LMX4    133 g    Apply topically once as needed for mild pain    Port catheter in place       methylphenidate 10 MG tablet    RITALIN    60 tablet    Take 1 tablet (10 mg) by mouth 2 times daily    Attention deficit hyperactivity disorder (ADHD), unspecified ADHD type       mirtazapine 45 MG tablet    REMERON    30 tablet    Take 1 tablet (45 mg) by mouth At Bedtime    Major depressive disorder with single episode, in partial remission (H)       multivitamin, therapeutic with minerals Tabs tablet      Take 1 tablet by mouth 2 times daily        omeprazole 20 MG CR capsule    priLOSEC    360 capsule    Take 2 capsules (40 mg) by mouth 2 times daily    Gastroesophageal reflux disease without esophagitis       ONETOUCH LANCETS Misc     100 each    by In Vitro route 4 times daily as needed    Type II or unspecified type diabetes mellitus without mention of complication, not stated as uncontrolled       phenytoin 30 MG CR capsule    DILANTIN    720 capsule    Take 4 capsules (120 mg) by mouth 2 times daily    Oligoastrocytoma of parietal lobe (H)       pravastatin 80 MG tablet    PRAVACHOL    90 tablet    Take 1 tablet (80 mg) by mouth daily    High cholesterol       spironolactone 25 MG tablet    ALDACTONE    180 tablet    Take 2 tablets (50 mg) by mouth daily    Other ascites       thiamine 100 MG tablet     100 tablet    Take 1 tablet (100 mg) by mouth daily    Alcoholic cirrhosis of  liver with ascites (H)       traZODone 50 MG tablet    DESYREL    30 tablet    Take 1 tablet (50 mg) by mouth nightly as needed for sleep    Primary insomnia       Vitamin D (Cholecalciferol) 1000 units Tabs      Take 1 tablet by mouth daily        XIFAXAN 550 MG Tabs tablet   Generic drug:  rifaximin     60 tablet    TAKE ONE TABLET BY MOUTH TWICE DAILY    Hepatic encephalopathy (H)

## 2018-09-26 NOTE — PROGRESS NOTES
Surgeon:     Guru Jose Kelly MD      Fax number for Pre Op Evaluation: [facility]  Location of the surgery: [facility]  Date of the surgery: 10/01/2018  Procedure: COMBINED ESOPHAGOSCOPY, GASTROSCOPY, DUODENOSCOPY (EGD)  History of reaction to anesthesia? : NO

## 2018-09-26 NOTE — PATIENT INSTRUCTIONS
[facility] Medication Refill Request Information:  * Please contact your pharmacy regarding ANY request for medication refills.  ** [facility] Prescription Fax = [phone]  * Please allow 3 business days for routine medication refills.  * Please allow 5 business days for controlled substance medication refills.     [facility] Test Result notification information:  *You will be notified with in 7-10 days of your appointment day regarding the results of your test.  If you are on MyChart you will be notified as soon as the provider has reviewed the results and signed off on them.    [facility]: [phone]

## 2018-09-26 NOTE — PROGRESS NOTES
[facility] [place]   King Louis MD  09/26/2018     Chief Complaint:   Pre-Op Exam and Hospital F/U      History of Present Illness:   Mono Varghese is 47 year old male here at the request of Dr. Guru Jose Kelly for cardiovascular, pulmonary, and perioperative risk assessment prior to surgery.  The intended surgical procedure is a combined esophagoscopy, gastroscopy, duodenoscopy on 10/1/18.  A copy of this note will be sent to the surgeon. Also flex sig w/ possible variceal coil, rectal.     Reason for surgery:   The patient has a history of type II diabetes mellitus, liver failure, GERD, thrombocytopenia, cirrhosis c/p refractory ascites, GI bleed, rectal/esophageal varices who presents with a female  for a hospital follow-up and pre-op. On 9/14, the patient presented to the emergency department with two days of generalized weakness, confusion, cough, and aphasia. They did a brain MRI and CT, an infectious work-up to look for pneumonia, UTI, or SBP, checked thyroid and vitamin levels, and got an EEG to look for seizures. They believe he had some sort of URI leading to worsening symptoms prior to his brain surgery. He was discharged on 9/18/18 with instructions to continue taking lactulose and see his neurologist, neurosurgeon, gastroenterologist, and PCP. They want him to receive an endoscopy, which they were initially hesitant about. Additionally, the patient has had some previous rectal bleeding that he visited the emergency department for on 9/3/18. He was admitted the hospital and discharged on 9/6. He is not experiencing any more bleeding.     Weight Loss: The patient reports that he has an appetite now and his  says that he has been eating 2 meals per day since he left the hospital.     Strange Movements: At our last appointment on 8/24/18, they were concerned about strange movements including mouth sucking and finer movements. He is scheduled with  neurology and psychiatry next week.     Other concerns discussed:  1. He would like a flu shot.  2. He is taking lactulose and reports that his stools have been very loose.  3. Paracentesis yesterday, 7 L.  4. Mono reports monitoring his blood sugars at home and they range from 90-200s.   5. He has a nurse 3x a week and his  reports that he is getting stronger and doing better with daily activities and eating.     Cardiovascular Risk:  This patient does not have chest pain with ambulation or walking up a flight of stairs.  There is not any chest pain with exercise.  He does have a history of hypertension and high cholesterol.  There is not a history of stroke or valvular disease.    Pulmonary Risk:  The patient does not have any history of lung problems.    Perioperative Complications:  The patient does have a history of bleeding or clotting problems in the past, a portal vein thrombosis and LENA. They are not currently anticoagulated.  He has had complications from past surgeries with epistaxis, but is not currently dealing with this.  The patient is adopted and does not know of a family history of any anesthesia or surgical complications.     Review of Systems:   Pertinent items are noted in HPI, remainder of complete ROS is negative.      Active Medications:      Calcium Carb-Cholecalciferol (CALCIUM 500 +D) 500-400 MG-UNIT TABS, Take 500 mg by mouth daily, Disp: 90 tablet, Rfl: 1     ciclopirox (LOPROX) 0.77 % cream, Apply topically 2 times daily To feet and toenails., Disp: 90 g, Rfl: 4     cyanocobalamin 1000 MCG TABS, Take 1 tablet by mouth daily, Disp: , Rfl:      ferrous sulfate (IRON) 325 (65 FE) MG tablet, Take 1 tablet (325 mg) by mouth 2 times daily, Disp: 200 tablet, Rfl: 3     furosemide (LASIX) 20 MG tablet, Take 1 tablet (20 mg) by mouth daily, Disp: 30 tablet, Rfl: 3     gabapentin (NEURONTIN) 100 MG capsule, Take 1 capsule (100 mg) by mouth At Bedtime, Disp: 30 capsule, Rfl: 3      HYDROcodone-acetaminophen (NORCO) 5-325 MG per tablet, Take 2 tablets by mouth every 6 hours as needed for moderate to severe pain, Disp: 60 tablet, Rfl: 0     hydrOXYzine (ATARAX) 25 MG tablet, Take 1 tablet (25 mg) by mouth 2 times daily, Disp: 180 tablet, Rfl: 1     lactulose (CHRONULAC) 10 GM/15ML solution, Take 30 mLs (20 g) by mouth 3 times daily, Disp: 946 mL, Rfl: 3     levothyroxine (SYNTHROID/LEVOTHROID) 88 MCG tablet, Take 1 tablet (88 mcg) by mouth daily, Disp: 90 tablet, Rfl: 1     lidocaine (LMX4) 4 % CREA cream, Apply topically once as needed for mild pain, Disp: 133 g, Rfl: 1     methylphenidate (RITALIN) 10 MG tablet, Take 1 tablet (10 mg) by mouth 2 times daily, Disp: 60 tablet, Rfl: 0     mirtazapine (REMERON) 45 MG tablet, Take 1 tablet (45 mg) by mouth At Bedtime, Disp: 30 tablet, Rfl: 2     multivitamin, therapeutic with minerals (THERA-VIT-M) TABS, Take 1 tablet by mouth 2 times daily, Disp: , Rfl:      omeprazole (PRILOSEC) 20 MG CR capsule, Take 2 capsules (40 mg) by mouth 2 times daily, Disp: 360 capsule, Rfl: 3     phenytoin (DILANTIN) 30 MG CR capsule, Take 4 capsules (120 mg) by mouth 2 times daily, Disp: 720 capsule, Rfl: 11     pravastatin (PRAVACHOL) 80 MG tablet, Take 1 tablet (80 mg) by mouth daily, Disp: 90 tablet, Rfl: 3     spironolactone (ALDACTONE) 25 MG tablet, Take 2 tablets (50 mg) by mouth daily, Disp: 180 tablet, Rfl: 1     thiamine (VITAMIN B-1) 100 MG tablet, Take 1 tablet (100 mg) by mouth daily, Disp: 100 tablet, Rfl: 1     traZODone (DESYREL) 50 MG tablet, Take 1 tablet (50 mg) by mouth nightly as needed for sleep, Disp: 30 tablet, Rfl: 2     Vitamin D, Cholecalciferol, 1000 units TABS, Take 1 tablet by mouth daily, Disp: , Rfl:      XIFAXAN 550 MG TABS tablet, TAKE ONE TABLET BY MOUTH TWICE DAILY, Disp: 60 tablet, Rfl: 11      Allergies:   Latex; No clinical screening - see comments; and Tegaderm transparent dressing (informational only)      Past Medical  History:  ADHD  Alcoholic cirrhosis of liver with ascites  Chronic pain  Depressive disorder  Encephalopathy, hepatic  GERD (gastroesophageal reflux disease)  GI bleed  oligosatrocytoma of parietal lobe  LENA  Hyperlipidemia, LDL goal <100  hypothyroidism   Liver failure  Moderate major depression  liver failure   Obesity  Pancytopenia  Partial epilepsy  Hypertensive gastropathy  Portal vein thrombosis  Pulmonary nodules  Type 2 diabetes  Rectal bleeding  Thrombocytopenia  Myopic astigmatism bilateral  Presbyopia  Ringing ears  Pancytopenia  Hepatic encephalopathy     Past Surgical History:  Colonoscopy  Esophagoscopy, gastroscopy, duodenoscopy, combined x8  Optical tracking system craniotomy, excise tumor, combined  Orthopedic surgery  Sigmoidoscopy  Surgery  Sigmoidoscopy flexible x2    Family History:   Adopted: yes - medical history unknown for rest of family  Cirrhosis - father       Social History:   The patient is , a nonsmoker, and stopped consuming alcohol in 2014.      Physical Exam:   /72  Pulse 79  SpO2 95%  Constitutional: Alert. In no distress.  ENT: No neck nodes or sinus tenderness.  Cardiovascular: RRR. No murmurs, clicks, gallops, or rub.  Respiratory: Clear to auscultation bilaterally, no wheezes or crackles.  Neurologic: He has a Palsy in his extremities. He is forgetful.   Psychiatric: Mentation appears normal. Normal affect.   Abdomen: bloated, nontender    Recent Labs:  Component      Latest Ref Rng & Units 9/17/2018 9/18/2018   Sodium      133 - 144 mmol/L 137 137   Potassium      3.4 - 5.3 mmol/L 3.6 3.9   Chloride      94 - 109 mmol/L 108 108   Carbon Dioxide      20 - 32 mmol/L 20 21   Anion Gap      3 - 14 mmol/L 9 8   Glucose      70 - 99 mg/dL 114 (H) 111 (H)   Urea Nitrogen      7 - 30 mg/dL 18 16   Creatinine      0.66 - 1.25 mg/dL 0.85 0.84   GFR Estimate      >60 mL/min/1.7m2 >90 >90   GFR Estimate If Black      >60 mL/min/1.7m2 >90 >90   Calcium      8.5 - 10.1 mg/dL  7.9 (L) 8.0 (L)   Bilirubin Total      0.2 - 1.3 mg/dL 1.1    Albumin      3.4 - 5.0 g/dL 2.8 (L)    Protein Total      6.8 - 8.8 g/dL 6.2 (L)    Alkaline Phosphatase      40 - 150 U/L 323 (H)    ALT      0 - 70 U/L 39    AST      0 - 45 U/L 40    WBC      4.0 - 11.0 10e9/L  2.2 (L)   RBC Count      4.4 - 5.9 10e12/L  3.63 (L)   Hemoglobin      13.3 - 17.7 g/dL  11.7 (L)   Hematocrit      40.0 - 53.0 %  34.4 (L)   MCV      78 - 100 fl  95   MCH      26.5 - 33.0 pg  32.2   MCHC      31.5 - 36.5 g/dL  34.0   RDW      10.0 - 15.0 %  14.8   Platelet Count      150 - 450 10e9/L  37 (LL)   INR      0.86 - 1.14 1.61 (H)    Magnesium      1.6 - 2.3 mg/dL  1.7     EKG:  EKG was indicated based on risk assessment.     Indication:  Pre-op  Time:   1532  Impression: Normal sinus rhythm    Cannot rule out anterior infarct, age undetermined    Abnormal ECG   Vent. Rate: 73  R-axis:  3  QT:  408    Assessment and Plan:  Endoscopy next week for varices.     Rectal variceal bleed  I will communicate with GI as whether this should be addressed and if so, by whom.    He sees neurology soon for his abnormal movements, which started recently as well as his seizure disorder.     Long Q-T syndrome  Today's EKG Q-T is normal.   - EKG Performed in Clinic w/ Provider Reading Fee    Hypothyroidism, unspecified type  - TSH with free T4 reflex    Liver disease, chronic, due to alcohol (H)    - FLU VACCINE, AGE >= 3 YR  - CBC with platelets differential  - Comprehensive metabolic panel  - INR    1. Pre-operative assessment for combined esophagoscopy, gastroscopy, duodenoscopy on 10/1/18.    The patient is at LOW risk for cardiovascular complications and at LOW risk for pulmonary complications of this LOW risk surgery.\    Labs   Platelets 41, INR 1.44, reviewed w/ GI MD, who has asked us to order platelets and FFP to be on hand prn during procedure, orders placed, consent from wife, discussed w/ her    --Approval given to proceed with proposed  procedure, without further diagnostic evaluation  --Patient is to take all other scheduled medications on the day of surgery     The patient has been told to not take any aspirin or NSAIDs for 10 days prior to surgery. The patient has been instructed as to what to do with medications prior to surgery.    Follow-up: Return to clinic in prn.     Due to weakness from liver disease sequelae brain tumor treatments, along with imbalance from tumor and treatments, Mono has mobility limitations which require use of a wheelchair in the home and when elsewhere to perform ADL's and be able to leave the home.    Pt has chronic pain/, Malignant neoplasm of parietal lobe/C713, moderate protein calorie malnutrition /  all which contribute to him to being unsteady on his feet/. Pt has tried a walker and has had frequent falls. Pt is 5'10 and 172 pounds and will fit into an 18x18 chair. A 17.5 inch height will allow pt to sit on a 2 inch cushion and use his feet to assist with propelling of chair. Pt will be able to sit in chair and access his bathroom and bed room to increase safety of transfers to bed and toilet and decrease his risk of falling. Pt will be able to sit in WC to perform grooming tasks sink side and access his dresser and closet and sit while dressing. Due to expected progression of disease pt will need chair for lifetime, pt is not expected to improve.  Pt has had physical and occupational therapy throughout course of illness and still requires wheel chair. Pts home allows for use of chair for completion of IADLs.      Scribe Disclosure:   I, Jody Jimenez, am serving as a scribe to document services personally performed by King Louis MD at this visit, based upon the provider's statements to me. All documentation has been reviewed by the aforementioned provider prior to being entered into the official medical record.     Portions of this medical record were completed by a scribe. UPON  MY REVIEW AND AUTHENTICATION BY ELECTRONIC SIGNATURE, this confirms (a) I performed the applicable clinical services, and (b) the record is accurate.    King Louis MD

## 2018-09-26 NOTE — NURSING NOTE
Chief Complaint   Patient presents with     Pre-Op Exam     pre op per pt      Hospital F/U     follow up after rectal bleeding      Anais Guerrero at 2:34 PM on 9/26/2018.        Mono Varghese      1.  Has the patient received the information for the influenza vaccine? YES    2.  Does the patient have any of the following contraindications?     Allergy to eggs? No     Allergic reaction to previous influenza vaccines? No     Any other problems to previous influenza vaccines? No     Paralyzed by Guillain-Stewart syndrome? No     Currently pregnant? NO     Current moderate or severe illness? No     Allergy to contact lens solution? No    3.  The vaccine has been administered in the usual fashion and the patient was instructed to wait 20 minutes before leaving the building in the event of an allergic reaction: YES    Vaccination given by.  Anais Guerrero, EMT at 4:23 PM on 9/26/2018.

## 2018-09-27 PROBLEM — R79.1 ELEVATED INR: Status: ACTIVE | Noted: 2018-01-01

## 2018-09-27 NOTE — PROGRESS NOTES
[facility] and [facility] now requests orders and shares plan of care/discharge summaries for some patients through [employer].  Please REPLY TO THIS MESSAGE OR ROUTE BACK TO THE AUTHOR in order to give authorization for orders when needed.  This is considered a verbal order, you will still receive a faxed copy of orders for signature.  Thank you for your assistance in improving collaboration for our patients.    Pt seen by nurse on Friday 9/21 last week and meds set up at that time. Writer was out to see the pt on Monday and reviewed pill box and noted that only 1 30mg capsule of Dilantin was in the pt's pill box for AM and PM, pt should be on 4 30mg capsules AM and PM. Note was sent to PMD on Monday when error noted and pill box reconciled to ensure accuracy. Writer visiting pt today and wife informed writer that you prescribe and manage is Dilantin. I had asked PMD if a Dilantin level should be drawn but did not hear back from PMD. Pt does have an appt with you next Tuesday and just wanted you to be aware of error. I am guessing pt only took Fri, Sat, Sun and Mon AM of wrong dose of Dilantin. Education has been provided to nurse regarding med error. No seizure activity observed or noted over the last week.    Thanks,   Meghann Snowden RN  Admission Clinician  [facility]  [phone]  [email or web address]

## 2018-09-27 NOTE — TELEPHONE ENCOUNTER
Pt is going to have EGD in the hospital on 10/1. We cannot order outpatient infusion. Dr. Kelly needs to order it for inpatient infusion. I sent the inbasket message to Dr. Kelly in [facility].     Soon-Mi  -------------------------------------------------------      ----- Message from King Louis MD sent at 9/27/2018 12:15 PM CDT -----  Thanks    Soon mi can you work on ordering Fresh Frozen Plasma and a six pack of platetets both re: low platelets, high INR--he does not need them given before GI procedure next week, but the products should be available on hold.    Thanks  Pietro  ----- Message -----     From: Guru Jose Kelly MD     Sent: 9/27/2018  12:13 PM       To: King Louis MD    Please have them as needed  ----- Message -----     From: King Louis MD     Sent: 9/27/2018  11:51 AM       To: Guru Jose Kelly MD    Oh sorry, I thought just EGD set up next week.    Would you like us to give him a six pack of platelets plus FFP or just have them if needed?    Thanks  Pietro  ----- Message -----     From: Guru Jose Kelly MD     Sent: 9/27/2018   8:39 AM       To: MD Richardson Horne    This is what has been scheduled and planned this Monday    From your end, if you could order platelets and FFP as needed for procedural support we will really appreciate this  ----- Message -----     From: King Louis MD     Sent: 9/27/2018   7:57 AM       To: Guru Jose Kelly MD    Hi,  You do his EGD next week; I saw him yesterday. Stable. Labs done yesterday show platelets 41 (stable for him) and INR 1.44 (stable for him too). Are those good enough to do the EGD? He could get platelets IV if need be, I'm not sure what we can do about the INR.    Also-I'm not sure if you know he was just inpatient for significant rectal bleed, determined to be rectal varices--it stopped on its own, so no intervention was  done. He and his family were told he might need rectal variceal coiling--do you know if we should pursue this or any other intervention for rectal varices, and if so would it be done by GI, Colorectal, or Interventional Radiology?    Thanks  Pietro

## 2018-09-28 NOTE — TELEPHONE ENCOUNTER
Verbal orders given to Angelique from [facility], per Dr. Louis, for Occupational Therapy, 6 visits in 7 weeks. Angelique to approve at [phone]. Tiffanie Lentz LPN 9/28/2018 4:31 PM

## 2018-09-28 NOTE — TELEPHONE ENCOUNTER
[facility] Call Center    Phone Message    May a detailed message be left on voicemail: yes    Reason for Call: Other: Angelique from  [facility] is calling for orders: Occupational Therapy, 6 visits in 7 weeks. Please call Angelique to approve at [phone]. You can leave a private mssg there - it is a secure vm. Thanks.     Action Taken: Message routed to:  [facility] ([facility]):  med pcc      Verbal order given and documented. Tiffanie Lentz LPN 9/28/2018 4:30 PM

## 2018-10-01 NOTE — OR NURSING
Dr. Liang ordered Tap water enema. RN complete. Minimal output. Semi-formed/loose, brown/yellow stool out, small amount.

## 2018-10-01 NOTE — IP AVS SNAPSHOT
MRN:[id number]                      After Visit Summary   10/1/2018    Mono Varghese    MRN: [id number]           Thank you!     Thank you for choosing [facility] for your care. Our goal is always to provide you with excellent care. Hearing back from our patients is one way we can continue to improve our services. Please take a few minutes to complete the written survey that you may receive in the mail after you visit with us. Thank you!        Patient Information     Date Of Birth          1970        About your hospital stay     You were admitted on:  October 1, 2018 You last received care in the:  Same Day Surgery [facility]    You were discharged on:  October 1, 2018       Who to Call     For medical emergencies, please call 911.  For non-urgent questions about your medical care, please call your primary care provider or clinic, [phone]  For questions related to your surgery, please call your surgery clinic        Attending Provider     Provider Guru Jose So MD Gastroenterology       Primary Care Provider Office Phone # Fax #    [place] [phone]      After Care Instructions     Discharge Instructions       No driving or operating machinery until the day after procedure.            Discharge Instructions       Recommend that a responsible adult remain with the patient at home for 24 hours post discharge.            Discharge Instructions       Start with clear liquids, sips of water 1 hour after procedure. If no abdominal pain and gag reflex has returned, advance as tolerated to pre-procedure diet.              Discharge Instructions       Restart home medications.            Discharge Instructions       Check with your Provider when to start anticoagulant medication.            Discharge Instructions       No ALCOHOL 24 hours post procedure.                  Your next 10 appointments already scheduled     Oct 02,  2018 10:00 AM CDT   Adult Med Follow UP with Leroy Alcaraz MD   [facility] ([facility])    [address]  [address] [address] [phone]            Oct 02, 2018  1:30 PM CDT   (Arrive by 1:15 PM)   Return Seizure with Anil English MD   [facility] ([facility] [place])    [address]  3rd Floor  [facility] [id number] [phone]            Nov 21, 2018 10:45 AM CST   MR ABDOMEN W/O & W CONTRAST with [address] MRI ([facility])    [address]  1st Floor  [facility] [address] [phone]           How do I prepare for my exam? (Food and drink instructions) Do not eat or drink for 6 hours prior to exam. **If you will be receiving sedation or general anesthesia, please see special notes below.**  How do I prepare for my exam? (Other instructions) Take your medicines as usual, unless your doctor tells you not to. You may or may not receive IV contrast for this exam pending the discretion of the Radiologist.  **If you will be receiving sedation or general anesthesia, please see special notes below.**  What should I wear: The MRI machine uses a strong magnet. Please wear clothes without metal (snaps, zippers). A sweatsuit works well, or we may give you a hospital gown. Please remove any body piercings and hair extensions before you arrive. You will also remove watches, jewelry, hairpins, wallets, dentures, partial dental plates and hearing aids. You may wear contact lenses, and you may be able to wear your rings. We have a safe place to keep your personal items, but it is safer to leave them at home.  How long does the exam take: Most tests take 30 to 60 minutes.  HOWEVER, IF YOUR DOCTOR PRESCRIBES ANESTHESIA please plan on spending four to five hours in the recovery room.  What should I bring: Bring a list of your current medicines to your exam  (including vitamins, minerals and over-the-counter drugs). Also bring the results of similar scans you may have had.  Do I need a : **If you will be receiving sedation or general anesthesia, please see special notes below.**  What should I do after the exam: No Restrictions, You may resume normal activities.  What is this test: MRI (magnetic resonance imaging) uses a strong magnet and radio waves to look inside the body. An MRA (magnetic resonance angiogram) does the same thing, but it lets us look at your blood vessels. A computer turns the radio waves into pictures showing cross sections of the body, much like slices of bread. This helps us see any problems more clearly. You may receive fluid (called  contrast ) before or during your scan. The fluid helps us see the pictures better. We give the fluid through an IV (small needle in your arm).  Who should I call with questions: If you have any questions, please contact your Imaging Department exam site. Directions, parking instructions, and other information is available on our website, [email or web address].            Feb 13, 2019  9:00 AM CST   Lab with  LAB   [facility] Lab ([facility])    [address]  1st Floor  [facility] [id number] [phone]            Feb 13, 2019 10:00 AM CST   (Arrive by 9:45 AM)   Return General Liver with Beatriz Tanner MD   [facility] Hepatology ([facility])    [address] [place] [address]  [address]  [facility] [id number] [phone]            Apr 26, 2019 10:00 AM CDT   MR BRAIN W/O & W CONTRAST with [address] MRI ([facility])    [address]  1st Floor  [facility] [id number] [phone]           How do I prepare for my exam? (Food and drink instructions) **If you will be receiving sedation or general anesthesia, please see special notes below.**  How do I prepare for my exam? (Other  instructions) Take your medicines as usual, unless your doctor tells you not to. You may or may not receive intravenous (IV) contrast for this exam pending the discretion of the Radiologist.  You do not need to do anything special to prepare.  **If you will be receiving sedation or general anesthesia, please see special notes below.**  What should I wear: The MRI machine uses a strong magnet. Please wear clothes without metal (snaps, zippers). A sweatsuit works well, or we may give you a hospital gown. Please remove any body piercings and hair extensions before you arrive. You will also remove watches, jewelry, hairpins, wallets, dentures, partial dental plates and hearing aids. You may wear contact lenses, and you may be able to wear your rings. We have a safe place to keep your personal items, but it is safer to leave them at home.  How long does the exam take: Most tests take 30 to 60 minutes.  HOWEVER, IF YOUR DOCTOR PRESCRIBES ANESTHESIA please plan on spending four to five hours in the recovery room.  What should I bring:  Bring a list of your current medicines to your exam (including vitamins, minerals and over-the-counter drugs).  Do I need a :  **If you will be receiving sedation or general anesthesia, please see special notes below.**  What should I do after the exam: No Restrictions, You may resume normal activities.  What is this test: MRI (magnetic resonance imaging) uses a strong magnet and radio waves to look inside the body. An MRA (magnetic resonance angiogram) does the same thing, but it lets us look at your blood vessels. A computer turns the radio waves into pictures showing cross sections of the body, much like slices of bread. This helps us see any problems more clearly. You may receive fluid (called  contrast ) before or during your scan. The fluid helps us see the pictures better. We give the fluid through an IV (small needle in your arm).  Who should I call with questions:  Please call  the Imaging Department at your exam site with any questions. Directions, parking instructions, and other information is available on our website, [email or web address].  How do I prepare if I m having sedation or anesthesia? **IMPORTANT** THE INSTRUCTIONS BELOW ARE ONLY FOR THOSE PATIENTS WHO HAVE BEEN TOLD THEY WILL RECEIVE SEDATION OR GENERAL ANESTHESIA DURING THEIR MRI PROCEDURE:  IF YOU WILL RECEIVE SEDATION (take medicine to help you relax during your exam): You must get the medicine from your doctor before you arrive. Bring the medicine to the exam. Do not take it at home. Arrive one hour early. Bring someone who can take you home after the test. Your medicine will make you sleepy. After the exam, you may not drive, take a bus or take a taxi by yourself. No eating 8 hours before your exam. You may have clear liquids up until 4 hours before your exam. (Clear liquids include water, clear tea, black coffee and fruit juice without pulp.)  IF YOU WILL RECEIVE ANESTHESIA (be asleep for your exam): Arrive 1 1/2 hours early. Bring someone who can take you home after the test. You may not drive, take a bus or take a taxi by yourself. No eating 8 hours before your exam. You may have clear liquids up until 4 hours before your exam. (Clear liquids include water, clear tea, black coffee and fruit juice without pulp.)            May 03, 2019 10:45 AM CDT   (Arrive by 10:30 AM)   Return Visit with Lauro Shaw MD   [facility] ([facility])    [address] [place] [address]  [address]  [facility] [address] [phone]              Further instructions from your care team       If you have obstructive sleep apnea     You were given anesthesia medicine during surgery to keep you comfortable and free of pain. After surgery, you may have more apnea spells because of this medicine and other medicines you were given. The spells may last longer than usual.     At home:        Keep using the  continuous positive airway pressure (CPAP) device when you sleep. Unless your health care provider tells you not to, use it when you sleep, day or night. CPAP is a common device used to treat obstructive sleep apnea.  [facility] [place]  Same-Day Surgery   Adult Discharge Orders & Instructions     For 24 hours after surgery    1. Get plenty of rest.  A responsible adult must stay with you for at least 24 hours after you leave the hospital.   2. Do not drive or use heavy equipment.  If you have weakness or tingling, don't drive or use heavy equipment until this feeling goes away.  3. Do not drink alcohol.  4. Avoid strenuous or risky activities.  Ask for help when climbing stairs.   5. You may feel lightheaded.  IF so, sit for a few minutes before standing.  Have someone help you get up.   6. If you have nausea (feel sick to your stomach): Drink only clear liquids such as apple juice, ginger ale, broth or 7-Up.  Rest may also help.  Be sure to drink enough fluids.  Move to a regular diet as you feel able.  7. You may have a slight fever. Call the doctor if your fever is over 100 F (37.7 C) (taken under the tongue) or lasts longer than 24 hours.  8. You may have a dry mouth, a sore throat, muscle aches or trouble sleeping.  These should go away after 24 hours.  9. Do not make important or legal decisions.   Call your doctor for any of the followin.  Signs of infection (fever, growing tenderness at the surgery site, a large amount of drainage or bleeding, severe pain, foul-smelling drainage, redness, swelling).    2. It has been over 8 to 10 hours since surgery and you are still not able to urinate (pass water).    3.  Headache for over 24 hours.    4.  Numbness, tingling or weakness the day after surgery (if you had spinal anesthesia).  To contact a doctor, call _____Dr. Guru Kelly @ [phone]_______ or:        [phone] and ask for the resident on call for  "  ___Surgery / Gastroenterology_____ (answered 24 hours a day)      Emergency Department:    [facility]: [phone]       (TTY for hearing impaired: [phone])    [facility]: [phone]       (TTY for hearing impaired: [phone])        Pending Results     No orders found from 9/29/2018 to 10/2/2018.            Admission Information     Date & Time Provider Department Dept. Phone    10/1/2018 Guru Jose Kelly MD Same Day Surgery [facility] [phone]      Your Vitals Were     Blood Pressure Pulse Temperature Respirations Height Weight    115/84 (BP Location: Left arm) 74 97.5  F (36.4  C) (Oral) 20 1.778 m (5' 10\") 78.4 kg (172 lb 13.5 oz)    Pulse Oximetry BMI (Body Mass Index)                100% 24.8 kg/m2          [employer] Information     [employer] gives you secure access to your electronic health record. If you see a primary care provider, you can also send messages to your care team and make appointments. If you have questions, please call your primary care clinic.  If you do not have a primary care provider, please call [phone] and they will assist you.        Care EveryWhere ID     This is your Care EveryWhere ID. This could be used by other organizations to access your [facility] medical records  [id number]        Equal Access to Services     SAMUEL FAIR : Hadii kevin Thomas, waaxda luqadaha, qaybta kaalmada kamla, josue tamayo. So [facility] [phone].    ATENCIÓN: Si habla español, tiene a chiang disposición servicios gratuitos de asistencia lingüística. Llame al [phone].    We comply with applicable federal civil rights laws and [place] laws. We do not discriminate on the basis of race, color, national origin, age, disability, sex, sexual orientation, or gender identity.               Review of your medicines      CONTINUE these medicines which have NOT CHANGED        Dose / Directions    blood glucose " monitoring test strip   Commonly known as:  ONETOUCH ULTRA   Used for:  Diabetes mellitus, type 2 (H)        Use to test blood sugars 4 times daily as needed or as directed.   Quantity:  400 each   Refills:  1       Calcium Carb-Cholecalciferol 500-400 MG-UNIT Tabs   Commonly known as:  calcium 500 +D   Used for:  Malnutrition (H)        Dose:  500 mg   Take 500 mg by mouth daily   Quantity:  90 tablet   Refills:  1       CANE, ANY MATERIAL   Used for:  Balance disorder        One cane   Quantity:  1 each   Refills:  0       ciclopirox 0.77 % cream   Commonly known as:  LOPROX   Used for:  Tinea pedis of both feet        Apply topically 2 times daily To feet and toenails.   Quantity:  90 g   Refills:  4       cyanocobalamin 1000 MCG Tabs        Dose:  1 tablet   Take 1 tablet by mouth daily   Refills:  0       ferrous sulfate 325 (65 Fe) MG tablet   Commonly known as:  IRON   Used for:  Other iron deficiency anemia        Dose:  1 tablet   Take 1 tablet (325 mg) by mouth 2 times daily   Quantity:  200 tablet   Refills:  3       furosemide 20 MG tablet   Commonly known as:  LASIX   Used for:  Other ascites        Dose:  20 mg   Take 1 tablet (20 mg) by mouth daily   Quantity:  30 tablet   Refills:  3       gabapentin 100 MG capsule   Commonly known as:  NEURONTIN   Used for:  Mild episode of recurrent major depressive disorder (H)        Dose:  100 mg   Take 1 capsule (100 mg) by mouth At Bedtime   Quantity:  30 capsule   Refills:  3       HYDROcodone-acetaminophen 5-325 MG per tablet   Commonly known as:  NORCO   Used for:  Brain tumor (H)        Dose:  2 tablet   Take 2 tablets by mouth every 6 hours as needed for moderate to severe pain   Quantity:  60 tablet   Refills:  0       hydrOXYzine 25 MG tablet   Commonly known as:  ATARAX   Used for:  Itching, Anxiety        Dose:  25 mg   Take 1 tablet (25 mg) by mouth 2 times daily   Quantity:  180 tablet   Refills:  1       lactulose 10 GM/15ML solution   Commonly  known as:  CHRONULAC   Used for:  Hepatic encephalopathy (H)        Dose:  20 g   Take 30 mLs (20 g) by mouth 3 times daily   Quantity:  946 mL   Refills:  3       levothyroxine 88 MCG tablet   Commonly known as:  SYNTHROID/LEVOTHROID   Used for:  Hypothyroidism        Dose:  88 mcg   Take 1 tablet (88 mcg) by mouth daily   Quantity:  90 tablet   Refills:  1       lidocaine 4 % Crea cream   Commonly known as:  LMX4   Used for:  Port catheter in place        Apply topically once as needed for mild pain   Quantity:  133 g   Refills:  1       methylphenidate 10 MG tablet   Commonly known as:  RITALIN   Used for:  Attention deficit hyperactivity disorder (ADHD), unspecified ADHD type        Dose:  10 mg   Take 1 tablet (10 mg) by mouth 2 times daily   Quantity:  60 tablet   Refills:  0       mirtazapine 45 MG tablet   Commonly known as:  REMERON   Used for:  Major depressive disorder with single episode, in partial remission (H)        Dose:  45 mg   Take 1 tablet (45 mg) by mouth At Bedtime   Quantity:  30 tablet   Refills:  2       multivitamin, therapeutic with minerals Tabs tablet        Dose:  1 tablet   Take 1 tablet by mouth 2 times daily   Refills:  0       omeprazole 20 MG CR capsule   Commonly known as:  priLOSEC   Used for:  Gastroesophageal reflux disease without esophagitis        Dose:  40 mg   Take 2 capsules (40 mg) by mouth 2 times daily   Quantity:  360 capsule   Refills:  3       ONETOUCH LANCETS Misc   Used for:  Type II or unspecified type diabetes mellitus without mention of complication, not stated as uncontrolled        by In Vitro route 4 times daily as needed   Quantity:  100 each   Refills:  prn       phenytoin 30 MG CR capsule   Commonly known as:  DILANTIN   Used for:  Oligoastrocytoma of parietal lobe (H)        Dose:  120 mg   Take 4 capsules (120 mg) by mouth 2 times daily   Quantity:  720 capsule   Refills:  11       pravastatin 80 MG tablet   Commonly known as:  PRAVACHOL   Used for:   High cholesterol        Dose:  80 mg   Take 1 tablet (80 mg) by mouth daily   Quantity:  90 tablet   Refills:  3       spironolactone 25 MG tablet   Commonly known as:  ALDACTONE   Used for:  Other ascites        Dose:  50 mg   Take 2 tablets (50 mg) by mouth daily   Quantity:  180 tablet   Refills:  1       thiamine 100 MG tablet   Used for:  Alcoholic cirrhosis of liver with ascites (H)        Dose:  100 mg   Take 1 tablet (100 mg) by mouth daily   Quantity:  100 tablet   Refills:  1       traZODone 50 MG tablet   Commonly known as:  DESYREL   Used for:  Primary insomnia        Dose:  50 mg   Take 1 tablet (50 mg) by mouth nightly as needed for sleep   Quantity:  30 tablet   Refills:  2       Vitamin D (Cholecalciferol) 1000 units Tabs        Dose:  1 tablet   Take 1 tablet by mouth daily   Refills:  0       XIFAXAN 550 MG Tabs tablet   Used for:  Hepatic encephalopathy (H)   Generic drug:  rifaximin        TAKE ONE TABLET BY MOUTH TWICE DAILY   Quantity:  60 tablet   Refills:  11                Protect others around you: Learn how to safely use, store and throw away your medicines at www.disposemymeds.org.             Medication List: This is a list of all your medications and when to take them. Check marks below indicate your daily home schedule. Keep this list as a reference.      Medications           Morning Afternoon Evening Bedtime As Needed    blood glucose monitoring test strip   Commonly known as:  ONETOUCH ULTRA   Use to test blood sugars 4 times daily as needed or as directed.                                Calcium Carb-Cholecalciferol 500-400 MG-UNIT Tabs   Commonly known as:  calcium 500 +D   Take 500 mg by mouth daily                                CANE, ANY MATERIAL   One cane                                ciclopirox 0.77 % cream   Commonly known as:  LOPROX   Apply topically 2 times daily To feet and toenails.                                cyanocobalamin 1000 MCG Tabs   Take 1 tablet by mouth  daily                                ferrous sulfate 325 (65 Fe) MG tablet   Commonly known as:  IRON   Take 1 tablet (325 mg) by mouth 2 times daily                                furosemide 20 MG tablet   Commonly known as:  LASIX   Take 1 tablet (20 mg) by mouth daily                                gabapentin 100 MG capsule   Commonly known as:  NEURONTIN   Take 1 capsule (100 mg) by mouth At Bedtime                                HYDROcodone-acetaminophen 5-325 MG per tablet   Commonly known as:  NORCO   Take 2 tablets by mouth every 6 hours as needed for moderate to severe pain                                hydrOXYzine 25 MG tablet   Commonly known as:  ATARAX   Take 1 tablet (25 mg) by mouth 2 times daily                                lactulose 10 GM/15ML solution   Commonly known as:  CHRONULAC   Take 30 mLs (20 g) by mouth 3 times daily                                levothyroxine 88 MCG tablet   Commonly known as:  SYNTHROID/LEVOTHROID   Take 1 tablet (88 mcg) by mouth daily                                lidocaine 4 % Crea cream   Commonly known as:  LMX4   Apply topically once as needed for mild pain                                methylphenidate 10 MG tablet   Commonly known as:  RITALIN   Take 1 tablet (10 mg) by mouth 2 times daily                                mirtazapine 45 MG tablet   Commonly known as:  REMERON   Take 1 tablet (45 mg) by mouth At Bedtime                                multivitamin, therapeutic with minerals Tabs tablet   Take 1 tablet by mouth 2 times daily                                omeprazole 20 MG CR capsule   Commonly known as:  priLOSEC   Take 2 capsules (40 mg) by mouth 2 times daily                                ONETOUCH LANCETS Misc   by In Vitro route 4 times daily as needed                                phenytoin 30 MG CR capsule   Commonly known as:  DILANTIN   Take 4 capsules (120 mg) by mouth 2 times daily                                pravastatin 80 MG  tablet   Commonly known as:  PRAVACHOL   Take 1 tablet (80 mg) by mouth daily                                spironolactone 25 MG tablet   Commonly known as:  ALDACTONE   Take 2 tablets (50 mg) by mouth daily                                thiamine 100 MG tablet   Take 1 tablet (100 mg) by mouth daily                                traZODone 50 MG tablet   Commonly known as:  DESYREL   Take 1 tablet (50 mg) by mouth nightly as needed for sleep                                Vitamin D (Cholecalciferol) 1000 units Tabs   Take 1 tablet by mouth daily                                XIFAXAN 550 MG Tabs tablet   TAKE ONE TABLET BY MOUTH TWICE DAILY   Generic drug:  rifaximin

## 2018-10-01 NOTE — IP AVS SNAPSHOT
Same Day Surgery [address] [address]    Phone:  [phone]                                       After Visit Summary   10/1/2018    Mnoo Varghese    MRN: [id number]           After Visit Summary Signature Page     I have received my discharge instructions, and my questions have been answered. I have discussed any challenges I see with this plan with the nurse or doctor.    ..........................................................................................................................................  Patient/Patient Representative Signature      ..........................................................................................................................................  Patient Representative Print Name and Relationship to Patient    ..................................................               ................................................  Date                                   Time    ..........................................................................................................................................  Reviewed by Signature/Title    ...................................................              ..............................................  Date                                               Time          22EPIC Rev 08/18

## 2018-10-01 NOTE — DISCHARGE INSTRUCTIONS
If you have obstructive sleep apnea     You were given anesthesia medicine during surgery to keep you comfortable and free of pain. After surgery, you may have more apnea spells because of this medicine and other medicines you were given. The spells may last longer than usual.     At home:        Keep using the continuous positive airway pressure (CPAP) device when you sleep. Unless your health care provider tells you not to, use it when you sleep, day or night. CPAP is a common device used to treat obstructive sleep apnea.  [facility], [place]  Same-Day Surgery   Adult Discharge Orders & Instructions     For 24 hours after surgery    1. Get plenty of rest.  A responsible adult must stay with you for at least 24 hours after you leave the hospital.   2. Do not drive or use heavy equipment.  If you have weakness or tingling, don't drive or use heavy equipment until this feeling goes away.  3. Do not drink alcohol.  4. Avoid strenuous or risky activities.  Ask for help when climbing stairs.   5. You may feel lightheaded.  IF so, sit for a few minutes before standing.  Have someone help you get up.   6. If you have nausea (feel sick to your stomach): Drink only clear liquids such as apple juice, ginger ale, broth or 7-Up.  Rest may also help.  Be sure to drink enough fluids.  Move to a regular diet as you feel able.  7. You may have a slight fever. Call the doctor if your fever is over 100 F (37.7 C) (taken under the tongue) or lasts longer than 24 hours.  8. You may have a dry mouth, a sore throat, muscle aches or trouble sleeping.  These should go away after 24 hours.  9. Do not make important or legal decisions.   Call your doctor for any of the followin.  Signs of infection (fever, growing tenderness at the surgery site, a large amount of drainage or bleeding, severe pain, foul-smelling drainage, redness, swelling).    2. It has been over 8 to 10 hours since surgery and you are  still not able to urinate (pass water).    3.  Headache for over 24 hours.    4.  Numbness, tingling or weakness the day after surgery (if you had spinal anesthesia).  To contact a doctor, call _____Dr. Guru Kelly @ [phone]_______ or:        [phone] and ask for the resident on call for   ___Surgery / Gastroenterology_____ (answered 24 hours a day)      Emergency Department:    [facility]: [phone]       (TTY for hearing impaired: [phone])    [facility]: [phone]       (TTY for hearing impaired: [phone])

## 2018-10-01 NOTE — ANESTHESIA PREPROCEDURE EVALUATION
Anesthesia Evaluation     . Pt has had prior anesthetic. Type: General and MAC    No history of anesthetic complications          ROS/MED HX    ENT/Pulmonary:      (-) tobacco use, sleep apnea and recent URI   Neurologic:     (+)seizures last seizure: Some time ago.  Well controlled with Dilantin.   features: Spacing out, difficulty sleeping, some shaking of the upper extremities.  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    Cardiovascular:     (+) Dyslipidemia, ----. : . . . :. .      (-) taking anticoagulants/antiplatelets, DOW and orthopnea/PND   METS/Exercise Tolerance: Comment: Walks with walker very debilitated but can go up to a block 3 - Able to walk 1-2 blocks without stopping   Hematologic: Comments: History of portal vein thrombosis.    pancytopenia    (+) History of blood clots pt is not anticoagulated, Anemia, History of Transfusion no previous transfusion reaction -      Musculoskeletal:  - neg musculoskeletal ROS       GI/Hepatic: Comment: Alcohol related hepatic cirrhosis with history of hepatic encephalopathy; esophageal varices with bleeding; portal vein thrombosis; portal hypertensive gastropathy; recurrent ascites; and recurrent rectal bleeding.      (+) GERD liver disease,       Renal/Genitourinary:  - ROS Renal section negative       Endo:     (+) date: 4/11/18 Using insulin - not using insulin pump Normal glucose range:  not previously admitted for DM/DKA thyroid problem hypothyroidism, Obesity, .   (-) Type I DM   Psychiatric:     (+) psychiatric history depression and other (comment) (ADHD)      Infectious Disease:  - neg infectious disease ROS       Malignancy:   (+) Malignancy History of Neuro  Neuro CA Remission status post Surgery, Chemo and Radiation.          Other:    (+) H/O Chronic Pain,H/O chronic opiod use ,                    Physical Exam  Normal systems:  cardiovascular and pulmonary    Airway   Mallampati: I  TM distance: >3 FB  Neck ROM: full    Dental   (+) missing    Cardiovascular   Rhythm and rate: regular and normal      Pulmonary    breath sounds clear to auscultation                    Anesthesia Plan      History & Physical Review  History and physical reviewed and following examination; no interval change.    ASA Status:  3 .    NPO Status:  > 8 hours    Plan for General, ETT and RSI with Intravenous induction. Maintenance will be Balanced.    PONV prophylaxis:  Ondansetron (or other 5HT-3) and Dexamethasone or Solumedrol  Will likely give one unit of platelets if pt shows signs of upper or lower GI bleeding during procedure      Postoperative Care  Postoperative pain management:  IV analgesics.      Consents  Anesthetic plan, risks, benefits and alternatives discussed with:  Patient.  Use of blood products discussed: Yes.   Use of blood products discussed with Patient.  Consented to blood products.  .                          .

## 2018-10-01 NOTE — BRIEF OP NOTE
[facility], [place]  Gastroenterology Brief Operative Note    Pre-operative diagnosis: Esophageal and rectal varices    Post-operative diagnosis Same   Procedure: Flexible sigmoidoscopy,radial rectal EUS and EGD with variceal banding    Surgeon: Dr Kelly    Assistants(s): Marina Liang MD   Anesthesia: General endotracheal anesthesia   Estimated blood loss: Minimal    Total IV fluids: (See anesthesia record)   Blood transfusion: No transfusion was given during surgery   Total urine output: (See anesthesia record)   Drains: None   Specimens: None   Implants: 3 variceal latex free band placed     Findings: Flexible sigmoidoscopy:  - large amount of residual stool precluding the exam.   - No endoscopic evidence of  Rectal varices on limited exam.  - Moderate sized internal hemorrhoids.    Radial EUS;   - Multiple small rectal varices were isolated.   - Obliteration not performed due to lack of colon prep.     EGD:  - Large distal esophageal varices.   - Evidence scar tissue from previous banding  - No Gastric varices  - Moderate portal hypertensive gastropathy   - Three bands were placed.      Complications: None   Condition: Stable   Comments:      Recommendations:         See dictated procedure report for full details (found in chart review under 'Procedures')    - Observe in same day for possible discharge  - Discharge home if minimal or no pain   - Colonoscopy prep followed by  Flexible sigmoidoscopy for rectal variceal coiling/glue injection in 2 weeks with Dr Tejada and Dr Kelly (10/15/18).  - Repeat EGD for variceal surveillance in 2 months with Dr Kelly.   - Findings were discussed with the patient and his mother-in-law at the end of the procedure.      Marina Liang MD  Advanced Endoscopy Fellow  Pager: [phone]

## 2018-10-01 NOTE — ANESTHESIA POSTPROCEDURE EVALUATION
Patient: Mono Varghese    Procedure(s):   - Wound Class: II-Clean Contaminated   - Wound Class: II-Clean Contaminated   - Wound Class: II-Clean Contaminated    Diagnosis:Esophageal Varices  Diagnosis Additional Information: No value filed.    Anesthesia Type:  General, ETT    Note:  Anesthesia Post Evaluation    Patient location during evaluation: PACU  Patient participation: Able to fully participate in evaluation  Level of consciousness: awake and alert  Pain management: adequate  Airway patency: patent  Cardiovascular status: acceptable  Respiratory status: acceptable  Hydration status: acceptable  PONV: none     Anesthetic complications: None          Last vitals:  Vitals:    10/01/18 1345 10/01/18 1400 10/01/18 1415   BP: 124/84 123/85 124/83   Pulse:      Resp: 16 14 20   Temp:  36.1  C (97  F)    SpO2: 99% 96% 98%         Electronically Signed By: Luna Boyer MD  October 1, 2018  2:39 PM

## 2018-10-01 NOTE — ANESTHESIA CARE TRANSFER NOTE
Patient: Mono Varghese    Procedure(s):   - Wound Class: II-Clean Contaminated   - Wound Class: II-Clean Contaminated   - Wound Class: II-Clean Contaminated    Diagnosis: Esophageal Varices  Diagnosis Additional Information: No value filed.    Anesthesia Type:   General, ETT     Note:  Airway :Face Mask  Patient transferred to:PACU  Comments: VSS, report to RN Handoff Report: Identifed the Patient, Identified the Reponsible Provider, Reviewed the pertinent medical history, Discussed the surgical course, Reviewed Intra-OP anesthesia mangement and issues during anesthesia, Set expectations for post-procedure period and Allowed opportunity for questions and acknowledgement of understanding      Vitals: (Last set prior to Anesthesia Care Transfer)    CRNA VITALS  10/1/2018 1302 - 10/1/2018 1337      10/1/2018             NIBP: (!)  137/96    NIBP Mean: 113                Electronically Signed By: TIESHA Figueroa CRNA  October 1, 2018  1:37 PM

## 2018-10-01 NOTE — OR NURSING
Dr. Liang at bedside. Speaking with patient. Explained procedure. Soft diet recommended. No labs needed before he discharges. MD would like to see patient prior to dischanrge home.

## 2018-10-02 NOTE — PROGRESS NOTES
[facility] now requests orders and shares plan of care/discharge summaries for some patients through [employer].      Please REPLY TO THIS MESSAGE OR ROUTE BACK TO THE AUTHOR in order to give authorization for orders when needed.       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 Patient and spouse are reporting a decline from previous speech therapy. Patient is demonstrating difficulty with recalling basic orientation information, immediate and delayed recall, and selective attention tasks.  Patient required increased processing time for all tasks. He is reporting increased difficulting finding words when communicating with family and friends. Verbal fluency task completed with below average scores, significant difficulty with both concrete and abstract levels of word finding. Recommend skilled ST for training in memory strategies, development of a home exercise program, and improvement in word finding.     Skilled services provided/ development and education of compensatory strategies and exercise program, constructive feedback on performance and hierarchical progression of cues and tasks.    GOALS/  To improve communication and cognition for safety in the home and community, by 11/1/18, the patient will  1.  state/utilize 3 strategies to compensate for memory with minimal cues   2.  complete a variety of basic memory tasks with 80 percent accuracy with minimal cues   3.  state/utilize 3 strategies to compensate for attention with minimal cues   4. complete a variety of basic reasoning/problem solving tasks with 80 percent accuracy with minimal cues   5. complete a variety of naming tasks to improve verbal fluency with 80 percent accuracy with minimal cues      Shavonne Nath MS CCC/SLP   Speech Language Pathologist  [phone]

## 2018-10-02 NOTE — PATIENT INSTRUCTIONS
- Continue current medications without change  - Dr. Alcaraz will connect with Dr. Michele about possibility of obtaining wheelchair  - Ask for Neurology's input about obtaining additional PCA hours for a temporary period given the worsening weakness  - Follow-up in 3 months; call ([phone]) or MyChart sooner with questions or concerns

## 2018-10-02 NOTE — PATIENT INSTRUCTIONS
Start Tegretol 1 twice a day today  In one week start to taper dilantin by 30 mg a day          120--90 x 1 dose             90-90 x 1 dose  Etc...  fiol

## 2018-10-02 NOTE — MR AVS SNAPSHOT
After Visit Summary   10/2/2018    Mono Varghese    MRN: [id number]           Patient Information     Date Of Birth          1970        Visit Information        Provider Department      10/2/2018 1:30 PM Anil English MD [facility] Neurology        Today's Diagnoses     Partial symptomatic epilepsy with complex partial seizures, not intractable, without status epilepticus (H)    -  1      Care Instructions    Start Tegretol 1 twice a day today  In one week start to taper dilantin by 30 mg a day          120--90 x 1 dose             90-90 x 1 dose  Etc...  aurelio          Follow-ups after your visit        Your next 10 appointments already scheduled     Oct 02, 2018  2:15 PM CDT   LAB with [facility] ([facility])    [address] [place] [address] [id number] [phone]           Please do not eat 10-12 hours before your appointment if you are coming in fasting for labs on lipids, cholesterol, or glucose (sugar). This does not apply to pregnant women. Water, hot tea and black coffee (with nothing added) are okay. Do not drink other fluids, diet soda or chew gum.            Nov 21, 2018 10:45 AM CST   MR ABDOMEN W/O & W CONTRAST with [address] [facility] ([facility])    [address] [place] [address] [phone] [phone]           How do I prepare for my exam? (Food and drink instructions) Do not eat or drink for 6 hours prior to exam. **If you will be receiving sedation or general anesthesia, please see special notes below.**  How do I prepare for my exam? (Other instructions) Take your medicines as usual, unless your doctor tells you not to. You may or may not receive IV contrast for this exam pending the discretion of the Radiologist.  **If you will be receiving sedation or general anesthesia, please see special notes below.**  What should I wear: The MRI machine uses a strong  magnet. Please wear clothes without metal (snaps, zippers). A sweatsuit works well, or we may give you a hospital gown. Please remove any body piercings and hair extensions before you arrive. You will also remove watches, jewelry, hairpins, wallets, dentures, partial dental plates and hearing aids. You may wear contact lenses, and you may be able to wear your rings. We have a safe place to keep your personal items, but it is safer to leave them at home.  How long does the exam take: Most tests take 30 to 60 minutes.  HOWEVER, IF YOUR DOCTOR PRESCRIBES ANESTHESIA please plan on spending four to five hours in the recovery room.  What should I bring: Bring a list of your current medicines to your exam (including vitamins, minerals and over-the-counter drugs). Also bring the results of similar scans you may have had.  Do I need a : **If you will be receiving sedation or general anesthesia, please see special notes below.**  What should I do after the exam: No Restrictions, You may resume normal activities.  What is this test: MRI (magnetic resonance imaging) uses a strong magnet and radio waves to look inside the body. An MRA (magnetic resonance angiogram) does the same thing, but it lets us look at your blood vessels. A computer turns the radio waves into pictures showing cross sections of the body, much like slices of bread. This helps us see any problems more clearly. You may receive fluid (called  contrast ) before or during your scan. The fluid helps us see the pictures better. We give the fluid through an IV (small needle in your arm).  Who should I call with questions: If you have any questions, please contact your Imaging Department exam site. Directions, parking instructions, and other information is available on our website, [email or web address].            Jan 02, 2019  1:00 PM CST   Adult Med Follow UP with Leroy Alcaraz MD   Psychiatry Clinic ([facility])    [address]  [address]  [address] [address]   [phone]            Feb 13, 2019  9:00 AM CST   Lab with  LAB   [facility] ([facility])    [address] [id number]   [phone]            Feb 13, 2019 10:00 AM CST   (Arrive by 9:45 AM)   Return General Liver with Beatriz Tanner MD   [facility]at[facility] ([facility])    [address] [place] [address]  [address]  [facility] [address]   [phone]            Apr 26, 2019 10:00 AM CDT   MR BRAIN W/O & W CONTRAST with [address] [facility] ([facility])    [address] [place] [id number]   [phone]           How do I prepare for my exam? (Food and drink instructions) **If you will be receiving sedation or general anesthesia, please see special notes below.**  How do I prepare for my exam? (Other instructions) Take your medicines as usual, unless your doctor tells you not to. You may or may not receive intravenous (IV) contrast for this exam pending the discretion of the Radiologist.  You do not need to do anything special to prepare.  **If you will be receiving sedation or general anesthesia, please see special notes below.**  What should I wear: The MRI machine uses a strong magnet. Please wear clothes without metal (snaps, zippers). A sweatsuit works well, or we may give you a hospital gown. Please remove any body piercings and hair extensions before you arrive. You will also remove watches, jewelry, hairpins, wallets, dentures, partial dental plates and hearing aids. You may wear contact lenses, and you may be able to wear your rings. We have a safe place to keep your personal items, but it is safer to leave them at home.  How long does the exam take: Most tests take 30 to 60 minutes.  HOWEVER, IF YOUR DOCTOR PRESCRIBES ANESTHESIA please plan on spending four to five hours in the recovery  room.  What should I bring:  Bring a list of your current medicines to your exam (including vitamins, minerals and over-the-counter drugs).  Do I need a :  **If you will be receiving sedation or general anesthesia, please see special notes below.**  What should I do after the exam: No Restrictions, You may resume normal activities.  What is this test: MRI (magnetic resonance imaging) uses a strong magnet and radio waves to look inside the body. An MRA (magnetic resonance angiogram) does the same thing, but it lets us look at your blood vessels. A computer turns the radio waves into pictures showing cross sections of the body, much like slices of bread. This helps us see any problems more clearly. You may receive fluid (called  contrast ) before or during your scan. The fluid helps us see the pictures better. We give the fluid through an IV (small needle in your arm).  Who should I call with questions:  Please call the Imaging Department at your exam site with any questions. Directions, parking instructions, and other information is available on our website, [email or web address].  How do I prepare if I m having sedation or anesthesia? **IMPORTANT** THE INSTRUCTIONS BELOW ARE ONLY FOR THOSE PATIENTS WHO HAVE BEEN TOLD THEY WILL RECEIVE SEDATION OR GENERAL ANESTHESIA DURING THEIR MRI PROCEDURE:  IF YOU WILL RECEIVE SEDATION (take medicine to help you relax during your exam): You must get the medicine from your doctor before you arrive. Bring the medicine to the exam. Do not take it at home. Arrive one hour early. Bring someone who can take you home after the test. Your medicine will make you sleepy. After the exam, you may not drive, take a bus or take a taxi by yourself. No eating 8 hours before your exam. You may have clear liquids up until 4 hours before your exam. (Clear liquids include water, clear tea, black coffee and fruit juice without pulp.)  IF YOU WILL RECEIVE ANESTHESIA (be asleep for your exam):  Arrive 1 1/2 hours early. Bring someone who can take you home after the test. You may not drive, take a bus or take a taxi by yourself. No eating 8 hours before your exam. You may have clear liquids up until 4 hours before your exam. (Clear liquids include water, clear tea, black coffee and fruit juice without pulp.)            May 03, 2019 10:45 AM CDT   (Arrive by 10:30 AM)   Return Visit with Lauro Shaw MD   [facility] ([facility])    [address] [facility] [address]  [address]  [facility] [address] [phone]              Future tests that were ordered for you today     Open Future Orders        Priority Expected Expires Ordered    Phenytoin level Routine  10/2/2019 10/2/2018            Who to contact     Please call your clinic at [phone] to:    Ask questions about your health    Make or cancel appointments    Discuss your medicines    Learn about your test results    Speak to your doctor            Additional Information About Your Visit        [employer] Information     [employer] gives you secure access to your electronic health record. If you see a primary care provider, you can also send messages to your care team and make appointments. If you have questions, please call your primary care clinic.  If you do not have a primary care provider, please call [phone] and they will assist you.      [employer] is an electronic gateway that provides easy, online access to your medical records. With [employer], you can request a clinic appointment, read your test results, renew a prescription or communicate with your care team.     To access your existing account, please contact your [facility] or call [phone] for assistance.        Care EveryWhere ID     This is your Care EveryWhere ID. This could be used by other organizations to access your [place] medical records  [id number]        Your Vitals Were     Pulse Height Pulse Oximetry BMI  "(Body Mass Index)          74 1.778 m (5' 10\") 95% 24.68 kg/m2         Blood Pressure from Last 3 Encounters:   10/02/18 120/75   10/01/18 120/75   09/26/18 109/72    Weight from Last 3 Encounters:   10/02/18 78 kg (172 lb)   10/01/18 78.4 kg (172 lb 13.5 oz)   09/17/18 77.2 kg (170 lb 4.8 oz)                 Today's Medication Changes          These changes are accurate as of 10/2/18  2:08 PM.  If you have any questions, ask your nurse or doctor.               Start taking these medicines.        Dose/Directions    carBAMazepine 200 MG tablet   Commonly known as:  TEGretol   Used for:  Partial symptomatic epilepsy with complex partial seizures, not intractable, without status epilepticus (H)   Started by:  Anil English MD        Dose:  200 mg   Take 1 tablet (200 mg) by mouth 2 times daily   Quantity:  120 tablet   Refills:  11         These medicines have changed or have updated prescriptions.        Dose/Directions    * methylphenidate 10 MG tablet   Commonly known as:  RITALIN   This may have changed:  These instructions start on 10/19/2018. If you are unsure what to do until then, ask your doctor or other care provider.   Used for:  Attention deficit hyperactivity disorder (ADHD), unspecified ADHD type   Changed by:  Leroy Alcaraz MD        Dose:  10 mg   Start taking on:  10/19/2018   Take 1 tablet (10 mg) by mouth 2 times daily   Quantity:  60 tablet   Refills:  0       * methylphenidate 10 MG tablet   Commonly known as:  RITALIN   This may have changed:  You were already taking a medication with the same name, and this prescription was added. Make sure you understand how and when to take each.   Used for:  Primary insomnia   Changed by:  Leroy Alcaraz MD        Dose:  10 mg   Start taking on:  11/18/2018   Take 1 tablet (10 mg) by mouth 2 times daily   Quantity:  60 tablet   Refills:  0       * methylphenidate 10 MG tablet   Commonly known as:  RITALIN   This may have changed:  You were already taking a " medication with the same name, and this prescription was added. Make sure you understand how and when to take each.   Used for:  Attention deficit hyperactivity disorder (ADHD), unspecified ADHD type   Changed by:  Leroy Alcaraz MD        Dose:  10 mg   Start taking on:  12/17/2018   Take 1 tablet (10 mg) by mouth 2 times daily   Quantity:  60 tablet   Refills:  0       * Notice:  This list has 3 medication(s) that are the same as other medications prescribed for you. Read the directions carefully, and ask your doctor or other care provider to review them with you.         Where to get your medicines      These medications were sent to [facility] [address] [place] [address] [facility]  [address] [facility] [address]     Phone:  [phone]     carBAMazepine 200 MG tablet    gabapentin 100 MG capsule    traZODone 50 MG tablet         Some of these will need a paper prescription and others can be bought over the counter.  Ask your nurse if you have questions.     Bring a paper prescription for each of these medications     methylphenidate 10 MG tablet    methylphenidate 10 MG tablet    methylphenidate 10 MG tablet                Primary Care Provider Office Phone # Fax #    [place] [phone]       [address] [address]        Equal Access to Services     SAMUEL FAIR AH: Hadward lirao Sojanna, waaxda luqadaha, qaybta kaalmada adeegyada, josue tamayo. So [facility] [phone].    ATENCIÓN: Si habla español, tiene a chiang disposición servicios gratuitos de asistencia lingüística. Llame al [phone].    We comply with applicable federal civil rights laws and [place] laws. We do not discriminate on the basis of race, color, national origin, age, disability, sex, sexual orientation, or gender identity.            Thank you!     Thank you for choosing [facility]  for your care. Our goal is always to provide you with  excellent care. Hearing back from our patients is one way we can continue to improve our services. Please take a few minutes to complete the written survey that you may receive in the mail after your visit with us. Thank you!             Your Updated Medication List - Protect others around you: Learn how to safely use, store and throw away your medicines at www.disposemymeds.org.          This list is accurate as of 10/2/18  2:08 PM.  Always use your most recent med list.                   Brand Name Dispense Instructions for use Diagnosis    blood glucose monitoring test strip    ONETOUCH ULTRA    400 each    Use to test blood sugars 4 times daily as needed or as directed.    Diabetes mellitus, type 2 (H)       Calcium Carb-Cholecalciferol 500-400 MG-UNIT Tabs    calcium 500 +D    90 tablet    Take 500 mg by mouth daily    Malnutrition (H)       CANE, ANY MATERIAL     1 each    One cane    Balance disorder       carBAMazepine 200 MG tablet    TEGretol    120 tablet    Take 1 tablet (200 mg) by mouth 2 times daily    Partial symptomatic epilepsy with complex partial seizures, not intractable, without status epilepticus (H)       ciclopirox 0.77 % cream    LOPROX    90 g    Apply topically 2 times daily To feet and toenails.    Tinea pedis of both feet       cyanocobalamin 1000 MCG Tabs      Take 1 tablet by mouth daily        ferrous sulfate 325 (65 Fe) MG tablet    IRON    200 tablet    Take 1 tablet (325 mg) by mouth 2 times daily    Other iron deficiency anemia       furosemide 20 MG tablet    LASIX    30 tablet    Take 1 tablet (20 mg) by mouth daily    Other ascites       gabapentin 100 MG capsule    NEURONTIN    30 capsule    Take 1 capsule (100 mg) by mouth At Bedtime    Mild episode of recurrent major depressive disorder (H)       HYDROcodone-acetaminophen 5-325 MG per tablet    NORCO    60 tablet    Take 2 tablets by mouth every 6 hours as needed for moderate to severe pain    Brain tumor (H)        hydrOXYzine 25 MG tablet    ATARAX    180 tablet    Take 1 tablet (25 mg) by mouth 2 times daily    Itching, Anxiety       lactulose 10 GM/15ML solution    CHRONULAC    946 mL    Take 30 mLs (20 g) by mouth 3 times daily    Hepatic encephalopathy (H)       levothyroxine 88 MCG tablet    SYNTHROID/LEVOTHROID    90 tablet    Take 1 tablet (88 mcg) by mouth daily    Hypothyroidism       lidocaine 4 % Crea cream    LMX4    133 g    Apply topically once as needed for mild pain    Port catheter in place       * methylphenidate 10 MG tablet   Start taking on:  10/19/2018    RITALIN    60 tablet    Take 1 tablet (10 mg) by mouth 2 times daily    Attention deficit hyperactivity disorder (ADHD), unspecified ADHD type       * methylphenidate 10 MG tablet   Start taking on:  11/18/2018    RITALIN    60 tablet    Take 1 tablet (10 mg) by mouth 2 times daily    Primary insomnia       * methylphenidate 10 MG tablet   Start taking on:  12/17/2018    RITALIN    60 tablet    Take 1 tablet (10 mg) by mouth 2 times daily    Attention deficit hyperactivity disorder (ADHD), unspecified ADHD type       mirtazapine 45 MG tablet    REMERON    30 tablet    Take 1 tablet (45 mg) by mouth At Bedtime    Major depressive disorder with single episode, in partial remission (H)       multivitamin, therapeutic with minerals Tabs tablet      Take 1 tablet by mouth 2 times daily        omeprazole 20 MG CR capsule    priLOSEC    360 capsule    Take 2 capsules (40 mg) by mouth 2 times daily    Gastroesophageal reflux disease without esophagitis       ONETOUCH LANCETS Misc     100 each    by In Vitro route 4 times daily as needed    Type II or unspecified type diabetes mellitus without mention of complication, not stated as uncontrolled       phenytoin 30 MG CR capsule    DILANTIN    720 capsule    Take 4 capsules (120 mg) by mouth 2 times daily    Oligoastrocytoma of parietal lobe (H)       pravastatin 80 MG tablet    PRAVACHOL    90 tablet    Take 1  tablet (80 mg) by mouth daily    High cholesterol       spironolactone 25 MG tablet    ALDACTONE    180 tablet    Take 2 tablets (50 mg) by mouth daily    Other ascites       thiamine 100 MG tablet     100 tablet    Take 1 tablet (100 mg) by mouth daily    Alcoholic cirrhosis of liver with ascites (H)       traZODone 50 MG tablet    DESYREL    30 tablet    Take 1 tablet (50 mg) by mouth nightly as needed for sleep    Primary insomnia       Vitamin D (Cholecalciferol) 1000 units Tabs      Take 1 tablet by mouth daily        XIFAXAN 550 MG Tabs tablet   Generic drug:  rifaximin     60 tablet    TAKE ONE TABLET BY MOUTH TWICE DAILY    Hepatic encephalopathy (H)       * Notice:  This list has 3 medication(s) that are the same as other medications prescribed for you. Read the directions carefully, and ask your doctor or other care provider to review them with you.

## 2018-10-02 NOTE — PROGRESS NOTES
"PSYCHIATRY CLINIC PROGRESS NOTE     CARE TEAM:  PCP-[place]    Specialty Providers- yes, Dr. Beatriz Tanner (GI), Lauro Shaw (Oncology)   Therapist- yes   [facility]- formerly [facility]     Mono Varghese is a 48 year old male who prefers the name Rafy/Mono & pronouns he, him, his, himself.    The initial diagnostic evaluation was on 8/13/2014.  Date of the most recent transfer of care eval is 10/02/2018.    Pertinent Background:  This patient first experienced mental health issues in childhood and has received treatment for ADHD, depression.  See transfer evaluation for detailed history.  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 Found down by wife w/ variceal bleeding and was in coma in 2016, with question of anoxic brain injury. May 2018 neuropsych testing showing global impairment with major deficits in all tested areas.     Psych critical item history includes trauma hx and substance use treatment.    INTERIM HISTORY                                                                                              4, 4   The patient reports adequate treatment adherence (medications set up by home health nurse; wife helps administer).  History was provided by the patient who was a somewhat poor historian. Patient's mother-in-law Faby also joined the interview per patient's request and was able to offer additional history.  The last " "visit ended with no change to the med regimen.      Since the last visit:  - Had two separate hospitalization--one for severe GI bleeding, and the other for weakness/encephalopathy. Also just had upper endoscopy w/ banding as well as flex sig done yesterday. Needs another GI procedure in 2 weeks as bowel prep had not been done.    - Has been feeling extremely weak, moreso on right side. Took a lot of effort to come into the clinic today. Faby wonder whether patient suffered a stroke recently. Seeing neurologist later today for this and for involuntary movements of hand and mouth.     - Wife Yisel has been really sick recently. Was in the hospital 3 times, including being in the ICU for 5 weeks for respiratory failure and ARDS. She recently just returned to work.     - Mood has been \"snippy.\" Sometimes gets upset with all the people (OT, PT, speech therapy) that are visiting. However, also accepts that he needs assistance to get better. Appetite has been pretty bad recently but improved after being taken off a diabetic medication.     - Had a birthday party last weekend. Brightened up when mentioning this and describes this as \"very successful.\"    RECENT SYMPTOMS:   DEPRESSION:  reports-anhedonia, low energy, appetite changes, weight changes and poor concentration /memory;  DENIES- suicidal ideation, self-destructive thoughts, depressed mood, feeling worthless, excessive guilt and feeling hopeless  LEN/HYPOMANIA:  reports-none;  DENIES- increased energy, decreased sleep need, increased activity and grandiosity  PSYCHOSIS:  reports-none;  DENIES- delusions, auditory hallucinations and visual hallucinations  DYSREGULATION:  reports-none;  DENIES- suicidal ideation, violent ideation and SIB  PANIC ATTACK:  none   ANXIETY:  Excessive worries  TRAUMA RELATED:  denies  SLEEP:  Sleeping on average 6-7 hours daily; feels this is going the best it has in years    EATING DISORDER: no, but struggling with eating due to " poor appetite    RECENT SUBSTANCE USE:     ALCOHOL- no      TOBACCO- no     CAFFEINE- N/A  OPIOIDS- no         NARCAN KIT- no        CANNABIS- no            OTHER ILLICIT DRUGS- none      CURRENT SOCIAL HISTORY:  FINANCIAL SUPPORT- wife Yisel works at [employer] and also serve as patient's PCA; also gets SSDI  CHILDREN- one child  in  in infancy      LIVING SITUATION- Live in [place] with wife and dog      SOCIAL/ SPIRITUAL SUPPORT-  since  (Yisel). Mother-in-law Faby also visits frequently.      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 Also question of anoxic brain injury  (found passed out in pool of blood 2/2 variceal bleed). Seen by Neuropsych, most recently in May 2018 where he was found to have global impairment with major deficits in all assessed cognitive domains.     Patient Active Problem List   Diagnosis     Type 2 diabetes mellitus (H)     Moderate major depression (H)     LENA (obstructive sleep apnea)-moderate (AHI 16)     Oligoastrocytoma of parietal lobe (H)     ADHD (attention deficit hyperactivity disorder)     Financial difficulties     Thrombocytopenia (H)     Partial epilepsy with impairment of consciousness (H)     Cirrhosis of liver (H)     Pulmonary nodules     Myopic astigmatism of both eyes     Presbyopia     Ringing in ears, unspecified laterality     GIB (gastrointestinal bleeding)     Portal vein thrombosis     Chronic pain     Hyperlipidemia LDL goal <100      Pancytopenia (H)     Hypothyroidism     Hx of psychiatric care     Encephalopathy, hepatic (H)     GI bleed     Encephalopathy     Elevated INR       Past Surgical History:   Procedure Laterality Date     COLONOSCOPY N/A 11/27/2015    Procedure: COMBINED COLONOSCOPY, SINGLE OR MULTIPLE BIOPSY/POLYPECTOMY BY BIOPSY;  Surgeon: Ran Thurston MD;  Location: [facility]     ESOPHAGOSCOPY, GASTROSCOPY, DUODENOSCOPY (EGD), COMBINED N/A 11/23/2015    Procedure: COMBINED ESOPHAGOSCOPY, GASTROSCOPY, DUODENOSCOPY (EGD);  Surgeon: Rocael Rizvi MD;  Location: [facility]     ESOPHAGOSCOPY, GASTROSCOPY, DUODENOSCOPY (EGD), COMBINED N/A 1/25/2017    Procedure: COMBINED ESOPHAGOSCOPY, GASTROSCOPY, DUODENOSCOPY (EGD), BIOPSY SINGLE OR MULTIPLE;  Surgeon: Rocael Tejada MD;  Location: [facility]     ESOPHAGOSCOPY, GASTROSCOPY, DUODENOSCOPY (EGD), COMBINED N/A 3/30/2017    Procedure: COMBINED ESOPHAGOSCOPY, GASTROSCOPY, DUODENOSCOPY (EGD);  Surgeon: Jordana Ramirez MD;  Location: [facility]     ESOPHAGOSCOPY, GASTROSCOPY, DUODENOSCOPY (EGD), COMBINED N/A 1/19/2018    Procedure: COMBINED ESOPHAGOSCOPY, GASTROSCOPY, DUODENOSCOPY (EGD);  Upper Endoscopy with verceal banding; Flexible Sigmoidoscopy;  Surgeon: Guru Jose Klely MD;  Location: [facility]     ESOPHAGOSCOPY, GASTROSCOPY, DUODENOSCOPY (EGD), COMBINED N/A 2/12/2018    Procedure: COMBINED ESOPHAGOSCOPY, GASTROSCOPY, DUODENOSCOPY (EGD);  Esophagogastroduodenoscopy with variceal banding;  Surgeon: Guru Jose Kelly MD;  Location: [facility]     ESOPHAGOSCOPY, GASTROSCOPY, DUODENOSCOPY (EGD), COMBINED N/A 3/5/2018    Procedure: COMBINED ESOPHAGOSCOPY, GASTROSCOPY, DUODENOSCOPY (EGD);  Esophagogastroduodenoscopy  with banding of varices Latex Allergy ;  Surgeon: Guru Jose Kelly MD;  Location: [facility]     ESOPHAGOSCOPY, GASTROSCOPY, DUODENOSCOPY (EGD), COMBINED N/A 4/16/2018    Procedure: COMBINED ESOPHAGOSCOPY, GASTROSCOPY,  DUODENOSCOPY (EGD);  Upper Endoscopy  with esophageal varices banding; Latex Allergy ;  Surgeon: Guru Jose Kelly MD;  Location: [facility]     ESOPHAGOSCOPY, GASTROSCOPY, DUODENOSCOPY (EGD), COMBINED N/A 5/30/2018    Procedure: COMBINED ESOPHAGOSCOPY, GASTROSCOPY, DUODENOSCOPY (EGD);  upper endoscopy with banding of esophageal varices. *latex Allergy*;  Surgeon: Guru Jose Kelly MD;  Location: [facility]     OPTICAL TRACKING SYSTEM CRANIOTOMY, EXCISE TUMOR, COMBINED  6/6/2013    Procedure: COMBINED OPTICAL TRACKING SYSTEM CRANIOTOMY, EXCISE TUMOR;  Left Stealth Guided Craniotomy , Tumor Resection ;  Surgeon: J Carlos Aranda MD;  Location: [facility]     ORTHOPEDIC SURGERY      hand surgery- right     SIGMOIDOSCOPY FLEXIBLE N/A 11/24/2015    Procedure: SIGMOIDOSCOPY FLEXIBLE;  Surgeon: Rocael Rizvi MD;  Location: [facility]     SIGMOIDOSCOPY FLEXIBLE N/A 1/19/2018    Procedure: SIGMOIDOSCOPY FLEXIBLE;;  Surgeon: Guru Jose Kelly MD;  Location: [facility]        ALLERGY                                Latex; No clinical screening - see comments; and Tegaderm transparent dressing (informational only)  MEDICATIONS                               Current Outpatient Prescriptions   Medication Sig Dispense Refill     blood glucose monitoring (ONETOUCH ULTRA) test strip Use to test blood sugars 4 times daily as needed or as directed. 400 each 1     Calcium Carb-Cholecalciferol (CALCIUM 500 +D) 500-400 MG-UNIT TABS Take 500 mg by mouth daily 90 tablet 1     CANE, ANY MATERIAL One cane 1 each 0     ciclopirox (LOPROX) 0.77 % cream Apply topically 2 times daily To feet and toenails. 90 g 4     cyanocobalamin 1000 MCG TABS Take 1 tablet by mouth daily       ferrous sulfate (IRON) 325 (65 FE) MG tablet Take 1 tablet (325 mg) by mouth 2 times daily 200 tablet 3     furosemide (LASIX) 20 MG tablet Take 1 tablet (20 mg) by mouth daily 30 tablet 3     gabapentin (NEURONTIN) 100 MG capsule  Take 1 capsule (100 mg) by mouth At Bedtime 30 capsule 3     HYDROcodone-acetaminophen (NORCO) 5-325 MG per tablet Take 2 tablets by mouth every 6 hours as needed for moderate to severe pain 60 tablet 0     hydrOXYzine (ATARAX) 25 MG tablet Take 1 tablet (25 mg) by mouth 2 times daily 180 tablet 1     lactulose (CHRONULAC) 10 GM/15ML solution Take 30 mLs (20 g) by mouth 3 times daily 946 mL 3     levothyroxine (SYNTHROID/LEVOTHROID) 88 MCG tablet Take 1 tablet (88 mcg) by mouth daily 90 tablet 1     lidocaine (LMX4) 4 % CREA cream Apply topically once as needed for mild pain 133 g 1     methylphenidate (RITALIN) 10 MG tablet Take 1 tablet (10 mg) by mouth 2 times daily 60 tablet 0     mirtazapine (REMERON) 45 MG tablet Take 1 tablet (45 mg) by mouth At Bedtime 30 tablet 2     multivitamin, therapeutic with minerals (THERA-VIT-M) TABS Take 1 tablet by mouth 2 times daily       omeprazole (PRILOSEC) 20 MG CR capsule Take 2 capsules (40 mg) by mouth 2 times daily 360 capsule 3     ONE TOUCH LANCETS MISC by In Vitro route 4 times daily as needed 100 each prn     phenytoin (DILANTIN) 30 MG CR capsule Take 4 capsules (120 mg) by mouth 2 times daily 720 capsule 11     pravastatin (PRAVACHOL) 80 MG tablet Take 1 tablet (80 mg) by mouth daily 90 tablet 3     spironolactone (ALDACTONE) 25 MG tablet Take 2 tablets (50 mg) by mouth daily 180 tablet 1     thiamine (VITAMIN B-1) 100 MG tablet Take 1 tablet (100 mg) by mouth daily 100 tablet 1     traZODone (DESYREL) 50 MG tablet Take 1 tablet (50 mg) by mouth nightly as needed for sleep 30 tablet 2     Vitamin D, Cholecalciferol, 1000 units TABS Take 1 tablet by mouth daily       XIFAXAN 550 MG TABS tablet TAKE ONE TABLET BY MOUTH TWICE DAILY 60 tablet 11     VITALS                                                                                                                                  3, 3   /65  Pulse 80      MENTAL STATUS EXAM                                       "                                                9, 14 [facility]   Alertness: alert  and oriented  Appearance: adequately groomed, wearing a 's hat  Behavior/Demeanor: cooperative and pleasant, with fair  eye contact   Speech: slowed and mildly dysarthric  Language: word finding difficulty  Psychomotor: choreoathetoid movements of the hand; involuntary movements of the mouth  Mood: \"snippy\"  Affect: restricted; was congruent to mood; was congruent to content  Thought Process/Associations: difficulties expressing thoughts  Thought Content:  Reports none;  Denies suicidal ideation, violent ideation and delusions  Perception:  Reports none;  Denies auditory hallucinations and visual hallucinations  Insight: fair  Judgment: fair  Cognition: (6) poor attention and memory, required MIL to provide majority of history  Gait and Station: remained in wheelchair    LABS and DATA   RATING SCALES:    N/A    PHQ9 TODAY = 10  PHQ-9 SCORE 8/17/2017 10/19/2017 1/23/2018   Total Score - - -   Total Score 18 13 11       DIAGNOSIS     Attention deficit hyperactivity disorder (ADHD) by history  Major depressive disorder with single episode, in partial remission (H)  Obstructive sleep apnea  Neurocognitive disorder     ASSESSMENT                                                                                                          m2, h3   45 yo M with h/o oligoastrocytoma s/p resection + xrt 2013, anoxic brain injury? 2016, EtOH cirrhosis, hypothyroidism, pancytopenia, cognitive impairment, seizure disorder, muliple childhood AEs, major depression. TODAY, patient describes feelings of low energy and weakness in the context of recent hospitalizations and GI procedures. He has some irritability but denies any thoughts of harm towards self or others. On exam, he continues to exhibit slowed speech, and poor memory. He appeared to have some increased difficulties with expressing his thoughts today. He will be following up with neurology " today to further assess his weakness. Also discussed with patient and mother-in-law that it may be beneficial to have a third-party PCA to minimize likelihood of caretaker burnout at home; patient is open to this idea. He and mother-in-law will further explore this with neurology and check in with county  about whether this would be financially feasible. Both patient and mother-in-law currently advocates to continue medications without change. They also prefer to continue with 3-month follow-ups as it can be difficult to get to clinic. Reminded them today that they can always call the clinic or use [employer] for questions or concerns in the interim.     [place] PRESCRIPTION MONITORING PROGRAM [] was checked today:  indicates appropriate filling of Ritalin.    PSYCHOTROPIC DRUG INTERACTIONS:   PHENYTOIN and TRAZODONE may result in increased phenytoin serum concentrations and an increased risk of phenytoin toxicity (ataxia, hyperreflexia, nystagmus, tremor).   PHENYTOIN and HYDROXYZINE may result in decreased phenytoin and/ or theophylline exposure.    MIRTAZAPINE and SEROTONERGIC AGENTS may result in increased risk of serotonin syndrome.     MANAGEMENT:  Monitoring for adverse effects     PLAN                                                                                                                       m2, h3     1) PSYCHOTROPIC MEDICATIONS:  Continue Medications--  - Remeron 45mg QHS   - Trazodone 50mg QHS  - Gabapentin 100mg QHS  - Ritalin 10mg BID    2) THERAPY:    continue    3) NEXT DUE:    Labs- N/A  EKG- N/A  Rating Scales- N/A    4) REFERRALS:    No Referrals needed     5) RTC: 3 months or sooner as needed    6) CRISIS NUMBERS:   Provided routinely in AVS.   [facility] [phone] (clinic)    [phone] (after hours)  ONLY if a [facility] PT: [facility] [phone] (clinic), [phone] (after hours)     TREATMENT RISK STATEMENT:  The risks, benefits, alternatives and potential  adverse effects have been discussed and are understood by the pt. The pt understands the risks of using street drugs or alcohol. There are no medical contraindications, the pt agrees to treatment with the ability to do so. The pt knows to call the clinic for any problems or to access emergency care if needed.  Medical and substance use concerns are documented above.  Psychotropic drug interaction check was done, including changes made today.    PROVIDER: Leroy Alcaraz MD    Patient staffed in clinic with Dr. Roa who will sign the note.  Supervisor is Dr. Danielson.  I saw the patient with the resident, and participated in key portions of the service, including the mental status examination and developing the plan of care. I reviewed key portions of the history with the resident. I agree with the findings and plan as documented in this note.    Radha Roa

## 2018-10-02 NOTE — MR AVS SNAPSHOT
After Visit Summary   10/2/2018    Mono Varghese    MRN: [id number]           Patient Information     Date Of Birth          1970        Visit Information        Provider Department      10/2/2018 10:00 AM Leroy Alcaraz MD [facility]        Today's Diagnoses     Attention deficit hyperactivity disorder (ADHD), unspecified ADHD type        Primary insomnia        Mild episode of recurrent major depressive disorder (H)          Care Instructions    - Continue current medications without change  - Dr. Alcaraz will connect with Dr. Michele about possibility of obtaining wheelchair  - Ask for Neurology's input about obtaining additional PCA hours for a temporary period given the worsening weakness  - Follow-up in 3 months; call ([phone]) or MyChart sooner with questions or concerns              Follow-ups after your visit        Follow-up notes from your care team     Return in about 3 months (around 1/2/2019).      Your next 10 appointments already scheduled     Oct 15, 2018   Procedure with Guru Jose Kelly MD   [facility], [place], Same Day Surgery (--)    [address] [facility] [id number] [phone]            Nov 21, 2018 10:45 AM CST   MR ABDOMEN W/O & W CONTRAST with [address] [facility] ([facility])    [address] [facility]  1st Floor  [facility] [id number] [phone]           How do I prepare for my exam? (Food and drink instructions) Do not eat or drink for 6 hours prior to exam. **If you will be receiving sedation or general anesthesia, please see special notes below.**  How do I prepare for my exam? (Other instructions) Take your medicines as usual, unless your doctor tells you not to. You may or may not receive IV contrast for this exam pending the discretion of the Radiologist.  **If you will be receiving sedation or general anesthesia, please see special notes below.**  What should I wear: The MRI machine  uses a strong magnet. Please wear clothes without metal (snaps, zippers). A sweatsuit works well, or we may give you a hospital gown. Please remove any body piercings and hair extensions before you arrive. You will also remove watches, jewelry, hairpins, wallets, dentures, partial dental plates and hearing aids. You may wear contact lenses, and you may be able to wear your rings. We have a safe place to keep your personal items, but it is safer to leave them at home.  How long does the exam take: Most tests take 30 to 60 minutes.  HOWEVER, IF YOUR DOCTOR PRESCRIBES ANESTHESIA please plan on spending four to five hours in the recovery room.  What should I bring: Bring a list of your current medicines to your exam (including vitamins, minerals and over-the-counter drugs). Also bring the results of similar scans you may have had.  Do I need a : **If you will be receiving sedation or general anesthesia, please see special notes below.**  What should I do after the exam: No Restrictions, You may resume normal activities.  What is this test: MRI (magnetic resonance imaging) uses a strong magnet and radio waves to look inside the body. An MRA (magnetic resonance angiogram) does the same thing, but it lets us look at your blood vessels. A computer turns the radio waves into pictures showing cross sections of the body, much like slices of bread. This helps us see any problems more clearly. You may receive fluid (called  contrast ) before or during your scan. The fluid helps us see the pictures better. We give the fluid through an IV (small needle in your arm).  Who should I call with questions: If you have any questions, please contact your Imaging Department exam site. Directions, parking instructions, and other information is available on our website, [email or web address].            Dec 03, 2018   Procedure with Guru Jose Kelly MD   [facility], [place], Same Day Surgery (--)    [address] [facility]  [address]   [phone]            Jan 02, 2019  1:00 PM CST   Adult Med Follow UP with Leroy Alcaraz MD   [facility]ry Clinic ([facility])    [address]  [address] [address]   [phone]            Feb 13, 2019  9:00 AM CST   Lab with  LAB   [facility] ([facility])    [address] [address]   [phone]            Feb 13, 2019 10:00 AM CST   (Arrive by 9:45 AM)   Return General Liver with Beatriz Tanner MD   [facility] Hepatology ([facility])    [address] [place] [address]  [address]  [facility] [address]   [phone]            Apr 26, 2019 10:00 AM CDT   MR BRAIN W/O & W CONTRAST with [address] MRI ([facility])    [address] [address] [phone]           How do I prepare for my exam? (Food and drink instructions) **If you will be receiving sedation or general anesthesia, please see special notes below.**  How do I prepare for my exam? (Other instructions) Take your medicines as usual, unless your doctor tells you not to. You may or may not receive intravenous (IV) contrast for this exam pending the discretion of the Radiologist.  You do not need to do anything special to prepare.  **If you will be receiving sedation or general anesthesia, please see special notes below.**  What should I wear: The MRI machine uses a strong magnet. Please wear clothes without metal (snaps, zippers). A sweatsuit works well, or we may give you a hospital gown. Please remove any body piercings and hair extensions before you arrive. You will also remove watches, jewelry, hairpins, wallets, dentures, partial dental plates and hearing aids. You may wear contact lenses, and you may be able to wear your rings. We have a safe place to keep your personal items, but it is safer to leave  them at home.  How long does the exam take: Most tests take 30 to 60 minutes.  HOWEVER, IF YOUR DOCTOR PRESCRIBES ANESTHESIA please plan on spending four to five hours in the recovery room.  What should I bring:  Bring a list of your current medicines to your exam (including vitamins, minerals and over-the-counter drugs).  Do I need a :  **If you will be receiving sedation or general anesthesia, please see special notes below.**  What should I do after the exam: No Restrictions, You may resume normal activities.  What is this test: MRI (magnetic resonance imaging) uses a strong magnet and radio waves to look inside the body. An MRA (magnetic resonance angiogram) does the same thing, but it lets us look at your blood vessels. A computer turns the radio waves into pictures showing cross sections of the body, much like slices of bread. This helps us see any problems more clearly. You may receive fluid (called  contrast ) before or during your scan. The fluid helps us see the pictures better. We give the fluid through an IV (small needle in your arm).  Who should I call with questions:  Please call the Imaging Department at your exam site with any questions. Directions, parking instructions, and other information is available on our website, [email or web address].  How do I prepare if I m having sedation or anesthesia? **IMPORTANT** THE INSTRUCTIONS BELOW ARE ONLY FOR THOSE PATIENTS WHO HAVE BEEN TOLD THEY WILL RECEIVE SEDATION OR GENERAL ANESTHESIA DURING THEIR MRI PROCEDURE:  IF YOU WILL RECEIVE SEDATION (take medicine to help you relax during your exam): You must get the medicine from your doctor before you arrive. Bring the medicine to the exam. Do not take it at home. Arrive one hour early. Bring someone who can take you home after the test. Your medicine will make you sleepy. After the exam, you may not drive, take a bus or take a taxi by yourself. No eating 8 hours before your exam. You may have clear  liquids up until 4 hours before your exam. (Clear liquids include water, clear tea, black coffee and fruit juice without pulp.)  IF YOU WILL RECEIVE ANESTHESIA (be asleep for your exam): Arrive 1 1/2 hours early. Bring someone who can take you home after the test. You may not drive, take a bus or take a taxi by yourself. No eating 8 hours before your exam. You may have clear liquids up until 4 hours before your exam. (Clear liquids include water, clear tea, black coffee and fruit juice without pulp.)            May 03, 2019 10:45 AM CDT   (Arrive by 10:30 AM)   Return Visit with Lauro Shaw MD   [facility] ([facility])    [address]  [address]  [facility] [address] [phone]              Who to contact     Please call your clinic at [phone] to:    Ask questions about your health    Make or cancel appointments    Discuss your medicines    Learn about your test results    Speak to your doctor            Additional Information About Your Visit        [email or web address] Information     [email or web address] gives you secure access to your electronic health record. If you see a primary care provider, you can also send messages to your care team and make appointments. If you have questions, please call your primary care clinic.  If you do not have a primary care provider, please call [phone] and they will assist you.      [email or web address] is an electronic gateway that provides easy, online access to your medical records. With [email or web address], you can request a clinic appointment, read your test results, renew a prescription or communicate with your care team.     To access your existing account, please contact your [facility] or call [phone] for assistance.        Care EveryWhere ID     This is your Care EveryWhere ID. This could be used by other organizations to access your [place] medical records  [id number]        Your Vitals Were     Pulse                    80            Blood Pressure from Last 3 Encounters:   10/03/18 130/70   10/02/18 120/75   10/02/18 105/65    Weight from Last 3 Encounters:   10/03/18 78 kg (172 lb)   10/02/18 78 kg (172 lb)   10/01/18 78.4 kg (172 lb 13.5 oz)              Today, you had the following     No orders found for display         Today's Medication Changes          These changes are accurate as of 10/2/18 11:59 PM.  If you have any questions, ask your nurse or doctor.               Start taking these medicines.        Dose/Directions    carBAMazepine 200 MG tablet   Commonly known as:  TEGretol   Used for:  Partial symptomatic epilepsy with complex partial seizures, not intractable, without status epilepticus (H)   Started by:  Anil English MD        Dose:  200 mg   Take 1 tablet (200 mg) by mouth 2 times daily   Quantity:  120 tablet   Refills:  11         These medicines have changed or have updated prescriptions.        Dose/Directions    * methylphenidate 10 MG tablet   Commonly known as:  RITALIN   This may have changed:  These instructions start on 10/19/2018. If you are unsure what to do until then, ask your doctor or other care provider.   Used for:  Attention deficit hyperactivity disorder (ADHD), unspecified ADHD type   Changed by:  Leroy Alcaraz MD        Dose:  10 mg   Start taking on:  10/19/2018   Take 1 tablet (10 mg) by mouth 2 times daily   Quantity:  60 tablet   Refills:  0       * methylphenidate 10 MG tablet   Commonly known as:  RITALIN   This may have changed:  You were already taking a medication with the same name, and this prescription was added. Make sure you understand how and when to take each.   Used for:  Primary insomnia   Changed by:  Leroy Alcaraz MD        Dose:  10 mg   Start taking on:  11/18/2018   Take 1 tablet (10 mg) by mouth 2 times daily   Quantity:  60 tablet   Refills:  0       * methylphenidate 10 MG tablet   Commonly known as:  RITALIN   This may have changed:  You were already  taking a medication with the same name, and this prescription was added. Make sure you understand how and when to take each.   Used for:  Attention deficit hyperactivity disorder (ADHD), unspecified ADHD type   Changed by:  Leroy Alcaraz MD        Dose:  10 mg   Start taking on:  12/17/2018   Take 1 tablet (10 mg) by mouth 2 times daily   Quantity:  60 tablet   Refills:  0       * Notice:  This list has 3 medication(s) that are the same as other medications prescribed for you. Read the directions carefully, and ask your doctor or other care provider to review them with you.         Where to get your medicines      These medications were sent to [facility] [address] [place] [address] [facility]  [address] [facility] [address]     Phone:  [phone]     carBAMazepine 200 MG tablet    gabapentin 100 MG capsule    traZODone 50 MG tablet         Some of these will need a paper prescription and others can be bought over the counter.  Ask your nurse if you have questions.     Bring a paper prescription for each of these medications     methylphenidate 10 MG tablet    methylphenidate 10 MG tablet    methylphenidate 10 MG tablet                Primary Care Provider Office Phone # Fax #    [place] [phone]       [address] [address]        Equal Access to Services     SAMUEL FAIR AH: Jennifer lirao Sojanna, waaxda luqadaha, qaybta kaalmada adeegyada, josue tamayo. So [facility] [phone].    ATENCIÓN: Si habla español, tiene a chiang disposición servicios gratuitos de asistencia lingüística. Llame al [phone].    We comply with applicable federal civil rights laws and [place] laws. We do not discriminate on the basis of race, color, national origin, age, disability, sex, sexual orientation, or gender identity.            Thank you!     Thank you for choosing [facility]  for your care. Our goal is always to provide  you with excellent care. Hearing back from our patients is one way we can continue to improve our services. Please take a few minutes to complete the written survey that you may receive in the mail after your visit with us. Thank you!             Your Updated Medication List - Protect others around you: Learn how to safely use, store and throw away your medicines at www.disposemymeds.org.          This list is accurate as of 10/2/18 11:59 PM.  Always use your most recent med list.                   Brand Name Dispense Instructions for use Diagnosis    blood glucose monitoring test strip    ONETOUCH ULTRA    400 each    Use to test blood sugars 4 times daily as needed or as directed.    Diabetes mellitus, type 2 (H)       Calcium Carb-Cholecalciferol 500-400 MG-UNIT Tabs    calcium 500 +D    90 tablet    Take 500 mg by mouth daily    Malnutrition (H)       CANE, ANY MATERIAL     1 each    One cane    Balance disorder       carBAMazepine 200 MG tablet    TEGretol    120 tablet    Take 1 tablet (200 mg) by mouth 2 times daily    Partial symptomatic epilepsy with complex partial seizures, not intractable, without status epilepticus (H)       ciclopirox 0.77 % cream    LOPROX    90 g    Apply topically 2 times daily To feet and toenails.    Tinea pedis of both feet       cyanocobalamin 1000 MCG Tabs      Take 1 tablet by mouth daily        ferrous sulfate 325 (65 Fe) MG tablet    IRON    200 tablet    Take 1 tablet (325 mg) by mouth 2 times daily    Other iron deficiency anemia       furosemide 20 MG tablet    LASIX    30 tablet    Take 1 tablet (20 mg) by mouth daily    Other ascites       gabapentin 100 MG capsule    NEURONTIN    30 capsule    Take 1 capsule (100 mg) by mouth At Bedtime    Mild episode of recurrent major depressive disorder (H)       HYDROcodone-acetaminophen 5-325 MG per tablet    NORCO    60 tablet    Take 2 tablets by mouth every 6 hours as needed for moderate to severe pain    Brain tumor (H)        hydrOXYzine 25 MG tablet    ATARAX    180 tablet    Take 1 tablet (25 mg) by mouth 2 times daily    Itching, Anxiety       lactulose 10 GM/15ML solution    CHRONULAC    946 mL    Take 30 mLs (20 g) by mouth 3 times daily    Hepatic encephalopathy (H)       levothyroxine 88 MCG tablet    SYNTHROID/LEVOTHROID    90 tablet    Take 1 tablet (88 mcg) by mouth daily    Hypothyroidism       lidocaine 4 % Crea cream    LMX4    133 g    Apply topically once as needed for mild pain    Port catheter in place       * methylphenidate 10 MG tablet   Start taking on:  10/19/2018    RITALIN    60 tablet    Take 1 tablet (10 mg) by mouth 2 times daily    Attention deficit hyperactivity disorder (ADHD), unspecified ADHD type       * methylphenidate 10 MG tablet   Start taking on:  11/18/2018    RITALIN    60 tablet    Take 1 tablet (10 mg) by mouth 2 times daily    Primary insomnia       * methylphenidate 10 MG tablet   Start taking on:  12/17/2018    RITALIN    60 tablet    Take 1 tablet (10 mg) by mouth 2 times daily    Attention deficit hyperactivity disorder (ADHD), unspecified ADHD type       multivitamin, therapeutic with minerals Tabs tablet      Take 1 tablet by mouth 2 times daily        omeprazole 20 MG CR capsule    priLOSEC    360 capsule    Take 2 capsules (40 mg) by mouth 2 times daily    Gastroesophageal reflux disease without esophagitis       ONETOUCH LANCETS Misc     100 each    by In Vitro route 4 times daily as needed    Type II or unspecified type diabetes mellitus without mention of complication, not stated as uncontrolled       phenytoin 30 MG CR capsule    DILANTIN    720 capsule    Take 4 capsules (120 mg) by mouth 2 times daily    Oligoastrocytoma of parietal lobe (H)       pravastatin 80 MG tablet    PRAVACHOL    90 tablet    Take 1 tablet (80 mg) by mouth daily    High cholesterol       spironolactone 25 MG tablet    ALDACTONE    180 tablet    Take 2 tablets (50 mg) by mouth daily    Other ascites        thiamine 100 MG tablet     100 tablet    Take 1 tablet (100 mg) by mouth daily    Alcoholic cirrhosis of liver with ascites (H)       traZODone 50 MG tablet    DESYREL    30 tablet    Take 1 tablet (50 mg) by mouth nightly as needed for sleep    Primary insomnia       Vitamin D (Cholecalciferol) 1000 units Tabs      Take 1 tablet by mouth daily        XIFAXAN 550 MG Tabs tablet   Generic drug:  rifaximin     60 tablet    TAKE ONE TABLET BY MOUTH TWICE DAILY    Hepatic encephalopathy (H)       * Notice:  This list has 3 medication(s) that are the same as other medications prescribed for you. Read the directions carefully, and ask your doctor or other care provider to review them with you.

## 2018-10-02 NOTE — LETTER
10/2/2018       RE: Mono Vagrhese  [address] [facility] [address]     Dear Colleague,    Thank you for referring your patient, Mono Varghese, to the [facility] NEUROLOGY at [facility]. Please see a copy of my visit note below.    Service Date: 10/02/2018      RE: Mono Varghese   MRN: [id number]   : 1970      Dear Dr. Louis:      I have his cousin on the phone.  I saw Mono Varghese for followup of his seizures.  He has a history of seizure but has not had anything for years.  We finally adjusted his medication to Dilantin which would be 120 twice a day.  The last concentration I have on the Dilantin is for a while.  He comes back for general, but really has not had a seizure for a long time.  On , his Dilantin level was 9.9.      He has been failing significantly.  His mother-in-law who is a geriatric nurse is with him today.  He has not been eating, he has been depressed.  He has had some choreoathetoid like movements which are of some concern.  He was recently hospitalized with anemia which has been a chronic affair with leukopenia and thrombocytopenia and one could not put exact etiology on that, although Dr. 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  This has been a current problem, but he is not drinking.  He has lost an awful lot of weight.  He looks thin and just generally has been deteriorating.      When I interview him, he is disoriented as to date and time and he is encephalopathic.  He does have some jerking of the hands and some soft choreoathetoid like movements of the hands.  His eye movements are good.  Face is symmetrical.  He really shows mild weakness of the right leg but no major deficit.  He has had a left parietal tumor removed and the last MRI of this was not recurrent.      In summary, he has an encephalopathy, probably liver related with tremors and choreoathetoid  movements.  The blood picture is a little worrisome as he has anemia and leukopenia and thrombocytopenia.  I wonder if Dilantin is playing a role here, which it certainly can.  We will change him over to carbamazepine from Dilantin and start him on 200 b.i.d. of carbamazepine and then after a week, start tapering the Dilantin by 30 mg a day.  He is getting some help and very little and his mother-in-law has been helping out.      She will be monitoring the situation.  She is a very good resource.  I wonder if the blood picture could be partly related to phenytoin toxicity.  He has had no seizures and the tumor has not recurred.        D: 10/02/2018   T: 10/03/2018   MT: ANGÉLICA      Name:     SALO MADERA   MRN:      [id number]        Account:      [id number]   :      1970           Service Date: 10/02/2018      Document: [id number]       Again, thank you for allowing me to participate in the care of your patient.      Sincerely,    Anil English MD    CC:  [place] MD   Primary Care Center    [address] [place] [id number]

## 2018-10-02 NOTE — PROGRESS NOTES
Answers for HPI/ROS submitted by the patient on 9/25/2018   General Symptoms: Yes  Skin Symptoms: No  HENT Symptoms: No  EYE SYMPTOMS: No  HEART SYMPTOMS: No  LUNG SYMPTOMS: Yes  INTESTINAL SYMPTOMS: Yes  URINARY SYMPTOMS: No  REPRODUCTIVE SYMPTOMS: No  SKELETAL SYMPTOMS: Yes  BLOOD SYMPTOMS: No  NERVOUS SYSTEM SYMPTOMS: Yes  MENTAL HEALTH SYMPTOMS: Yes  Fever: No  Loss of appetite: Yes  Weight loss: No  Weight gain: No  Fatigue: No  Night sweats: No  Chills: No  Increased stress: No  Excessive hunger: No  Excessive thirst: Yes  Feeling hot or cold when others believe the temperature is normal: Yes  Loss of height: No  Post-operative complications: No  Surgical site pain: No  Hallucinations: No  Change in or Loss of Energy: Yes  Hyperactivity: No  Confusion: Yes  Cough: Yes  Sputum or phlegm: No  Coughing up blood: No  Difficulty breating or shortness of breath: No  Snoring: No  Wheezing: No  Difficulty breathing on exertion: No  Nighttime Cough: No  Difficulty breathing when lying flat: No  Heart burn or indigestion: No  Nausea: No  Vomiting: No  Abdominal pain: No  Bloating: No  Constipation: No  Diarrhea: Yes  Blood in stool: No  Black stools: Yes  Rectal or Anal pain: No  Fecal incontinence: Yes  Yellowing of skin or eyes: No  Vomit with blood: No  Change in stools: No  Back pain: No  Muscle aches: Yes  Neck pain: No  Swollen joints: No  Joint pain: No  Bone pain: No  Muscle cramps: Yes  Muscle weakness: Yes  Joint stiffness: No  Bone fracture: No  Trouble with coordination: Yes  Dizziness or trouble with balance: Yes  Fainting or black-out spells: No  Memory loss: No  Headache: No  Seizures: No  Speech problems: Yes  Tingling: No  Tremor: No  Weakness: Yes  Difficulty walking: Yes  Paralysis: No  Numbness: No  Nervous or Anxious: No  Depression: Yes  Trouble sleeping: No  Trouble thinking or concentrating: No  Mood changes: No  Panic attacks: No

## 2018-10-03 NOTE — ED AVS SNAPSHOT
[facility], Emergency Department    [address] [facility] [address]    Phone:  [phone]                                       Mono Varghese   MRN: [id number]    Department:  [facility], Emergency Department   Date of Visit:  10/3/2018           Patient Information     Date Of Birth          1970        Your diagnoses for this visit were:     Epididymitis        You were seen by Trudy Price MD.        Discharge Instructions       Thank you for coming to the [facility] Emergency Department.     For scrotal swelling:  Keep the scrotum elevated on a towel when laying down in bed.   Apply talc powder to the skin folds to prevent moisture.   Examine the scrotum daily. Come to the ER if you notice increasing redness, skin wounds, necrotic (black or blue discolored) skin, increasing pain.     Start the antibiotics for 7 days:  Keflex--For possible skin infection  Levofloxacin--For epididymitis    Follow up with Urology regarding epididymitis seen on ultrasound. Please make an appointment to follow up with Urology Clinic (phone: [phone]) in 7 days.      Your next 10 appointments already scheduled     Nov 21, 2018 10:45 AM CST   MR ABDOMEN W/O & W CONTRAST with [address] [facility] ([facility])    [address] [address] [phone]           How do I prepare for my exam? (Food and drink instructions) Do not eat or drink for 6 hours prior to exam. **If you will be receiving sedation or general anesthesia, please see special notes below.**  How do I prepare for my exam? (Other instructions) Take your medicines as usual, unless your doctor tells you not to. You may or may not receive IV contrast for this exam pending the discretion of the Radiologist.  **If you will be receiving sedation or general anesthesia, please see special notes below.**  What should I wear: The MRI machine  uses a strong magnet. Please wear clothes without metal (snaps, zippers). A sweatsuit works well, or we may give you a hospital gown. Please remove any body piercings and hair extensions before you arrive. You will also remove watches, jewelry, hairpins, wallets, dentures, partial dental plates and hearing aids. You may wear contact lenses, and you may be able to wear your rings. We have a safe place to keep your personal items, but it is safer to leave them at home.  How long does the exam take: Most tests take 30 to 60 minutes.  HOWEVER, IF YOUR DOCTOR PRESCRIBES ANESTHESIA please plan on spending four to five hours in the recovery room.  What should I bring: Bring a list of your current medicines to your exam (including vitamins, minerals and over-the-counter drugs). Also bring the results of similar scans you may have had.  Do I need a : **If you will be receiving sedation or general anesthesia, please see special notes below.**  What should I do after the exam: No Restrictions, You may resume normal activities.  What is this test: MRI (magnetic resonance imaging) uses a strong magnet and radio waves to look inside the body. An MRA (magnetic resonance angiogram) does the same thing, but it lets us look at your blood vessels. A computer turns the radio waves into pictures showing cross sections of the body, much like slices of bread. This helps us see any problems more clearly. You may receive fluid (called  contrast ) before or during your scan. The fluid helps us see the pictures better. We give the fluid through an IV (small needle in your arm).  Who should I call with questions: If you have any questions, please contact your Imaging Department exam site. Directions, parking instructions, and other information is available on our website, [email or web address].            Jan 02, 2019  1:00 PM CST   Adult Med Follow UP with Leroy Alcaraz MD   Psychiatry Clinic ([facility])    [facility]  [address]  [address] [address]   [phone]            Feb 13, 2019  9:00 AM CST   Lab with  LAB   [facility] ([facility])    [address] [address]   [phone]            Feb 13, 2019 10:00 AM CST   (Arrive by 9:45 AM)   Return General Liver with Beatriz Tanner MD   [facility] ([facility])    [address] [place] [address]  [address]  [facility] [address]   [phone]            Apr 26, 2019 10:00 AM CDT   MR BRAIN W/O & W CONTRAST with [address] [facility] ([facility])    [address] [place] [address]   [phone]           How do I prepare for my exam? (Food and drink instructions) **If you will be receiving sedation or general anesthesia, please see special notes below.**  How do I prepare for my exam? (Other instructions) Take your medicines as usual, unless your doctor tells you not to. You may or may not receive intravenous (IV) contrast for this exam pending the discretion of the Radiologist.  You do not need to do anything special to prepare.  **If you will be receiving sedation or general anesthesia, please see special notes below.**  What should I wear: The MRI machine uses a strong magnet. Please wear clothes without metal (snaps, zippers). A sweatsuit works well, or we may give you a hospital gown. Please remove any body piercings and hair extensions before you arrive. You will also remove watches, jewelry, hairpins, wallets, dentures, partial dental plates and hearing aids. You may wear contact lenses, and you may be able to wear your rings. We have a safe place to keep your personal items, but it is safer to leave them at home.  How long does the exam take: Most tests take 30 to 60 minutes.  HOWEVER, IF YOUR DOCTOR PRESCRIBES ANESTHESIA please plan on spending four to five  hours in the recovery room.  What should I bring:  Bring a list of your current medicines to your exam (including vitamins, minerals and over-the-counter drugs).  Do I need a :  **If you will be receiving sedation or general anesthesia, please see special notes below.**  What should I do after the exam: No Restrictions, You may resume normal activities.  What is this test: MRI (magnetic resonance imaging) uses a strong magnet and radio waves to look inside the body. An MRA (magnetic resonance angiogram) does the same thing, but it lets us look at your blood vessels. A computer turns the radio waves into pictures showing cross sections of the body, much like slices of bread. This helps us see any problems more clearly. You may receive fluid (called  contrast ) before or during your scan. The fluid helps us see the pictures better. We give the fluid through an IV (small needle in your arm).  Who should I call with questions:  Please call the Imaging Department at your exam site with any questions. Directions, parking instructions, and other information is available on our website, [email or web address].  How do I prepare if I m having sedation or anesthesia? **IMPORTANT** THE INSTRUCTIONS BELOW ARE ONLY FOR THOSE PATIENTS WHO HAVE BEEN TOLD THEY WILL RECEIVE SEDATION OR GENERAL ANESTHESIA DURING THEIR MRI PROCEDURE:  IF YOU WILL RECEIVE SEDATION (take medicine to help you relax during your exam): You must get the medicine from your doctor before you arrive. Bring the medicine to the exam. Do not take it at home. Arrive one hour early. Bring someone who can take you home after the test. Your medicine will make you sleepy. After the exam, you may not drive, take a bus or take a taxi by yourself. No eating 8 hours before your exam. You may have clear liquids up until 4 hours before your exam. (Clear liquids include water, clear tea, black coffee and fruit juice without pulp.)  IF YOU WILL RECEIVE ANESTHESIA (be  asleep for your exam): Arrive 1 1/2 hours early. Bring someone who can take you home after the test. You may not drive, take a bus or take a taxi by yourself. No eating 8 hours before your exam. You may have clear liquids up until 4 hours before your exam. (Clear liquids include water, clear tea, black coffee and fruit juice without pulp.)            May 03, 2019 10:45 AM CDT   (Arrive by 10:30 AM)   Return Visit with Lauro Shaw MD   [facility] ([facility])    [address] [facility]  [address]  [facility] [address] [phone]              24 Hour Appointment Hotline       To make an appointment at any [facility], call [phone] ([phone]). If you don't have a family doctor or clinic, we will help you find one. [place] clinics are conveniently located to serve the needs of you and your family.             Review of your medicines      START taking        Dose / Directions Last dose taken    cephALEXin 500 MG capsule   Commonly known as:  KEFLEX   Dose:  500 mg   Quantity:  28 capsule        Take 1 capsule (500 mg) by mouth 4 times daily for 7 days   Refills:  0        levofloxacin 500 MG tablet   Commonly known as:  LEVAQUIN   Dose:  500 mg   Quantity:  7 tablet        Take 1 tablet (500 mg) by mouth daily   Refills:  0          Our records show that you are taking the medicines listed below. If these are incorrect, please call your family doctor or clinic.        Dose / Directions Last dose taken    blood glucose monitoring test strip   Commonly known as:  ONETOUCH ULTRA   Quantity:  400 each        Use to test blood sugars 4 times daily as needed or as directed.   Refills:  1        Calcium Carb-Cholecalciferol 500-400 MG-UNIT Tabs   Commonly known as:  calcium 500 +D   Dose:  500 mg   Quantity:  90 tablet        Take 500 mg by mouth daily   Refills:  1        CANE, ANY MATERIAL   Quantity:  1 each        One cane   Refills:  0        carBAMazepine  200 MG tablet   Commonly known as:  TEGretol   Dose:  200 mg   Quantity:  120 tablet        Take 1 tablet (200 mg) by mouth 2 times daily   Refills:  11        ciclopirox 0.77 % cream   Commonly known as:  LOPROX   Quantity:  90 g        Apply topically 2 times daily To feet and toenails.   Refills:  4        cyanocobalamin 1000 MCG Tabs   Dose:  1 tablet        Take 1 tablet by mouth daily   Refills:  0        ferrous sulfate 325 (65 Fe) MG tablet   Commonly known as:  IRON   Dose:  1 tablet   Quantity:  200 tablet        Take 1 tablet (325 mg) by mouth 2 times daily   Refills:  3        furosemide 20 MG tablet   Commonly known as:  LASIX   Dose:  20 mg   Quantity:  30 tablet        Take 1 tablet (20 mg) by mouth daily   Refills:  3        gabapentin 100 MG capsule   Commonly known as:  NEURONTIN   Dose:  100 mg   Quantity:  30 capsule        Take 1 capsule (100 mg) by mouth At Bedtime   Refills:  3        HYDROcodone-acetaminophen 5-325 MG per tablet   Commonly known as:  NORCO   Dose:  2 tablet   Quantity:  60 tablet        Take 2 tablets by mouth every 6 hours as needed for moderate to severe pain   Refills:  0        hydrOXYzine 25 MG tablet   Commonly known as:  ATARAX   Dose:  25 mg   Quantity:  180 tablet        Take 1 tablet (25 mg) by mouth 2 times daily   Refills:  1        lactulose 10 GM/15ML solution   Commonly known as:  CHRONULAC   Dose:  20 g   Quantity:  946 mL        Take 30 mLs (20 g) by mouth 3 times daily   Refills:  3        levothyroxine 88 MCG tablet   Commonly known as:  SYNTHROID/LEVOTHROID   Dose:  88 mcg   Quantity:  90 tablet        Take 1 tablet (88 mcg) by mouth daily   Refills:  1        lidocaine 4 % Crea cream   Commonly known as:  LMX4   Quantity:  133 g        Apply topically once as needed for mild pain   Refills:  1        * methylphenidate 10 MG tablet   Commonly known as:  RITALIN   Dose:  10 mg   Quantity:  60 tablet   Start taking on:  10/19/2018        Take 1 tablet (10  mg) by mouth 2 times daily   Refills:  0        * methylphenidate 10 MG tablet   Commonly known as:  RITALIN   Dose:  10 mg   Quantity:  60 tablet   Start taking on:  11/18/2018        Take 1 tablet (10 mg) by mouth 2 times daily   Refills:  0        * methylphenidate 10 MG tablet   Commonly known as:  RITALIN   Dose:  10 mg   Quantity:  60 tablet   Start taking on:  12/17/2018        Take 1 tablet (10 mg) by mouth 2 times daily   Refills:  0        mirtazapine 45 MG tablet   Commonly known as:  REMERON   Dose:  45 mg   Quantity:  30 tablet        Take 1 tablet (45 mg) by mouth At Bedtime   Refills:  2        multivitamin, therapeutic with minerals Tabs tablet   Dose:  1 tablet        Take 1 tablet by mouth 2 times daily   Refills:  0        omeprazole 20 MG CR capsule   Commonly known as:  priLOSEC   Dose:  40 mg   Quantity:  360 capsule        Take 2 capsules (40 mg) by mouth 2 times daily   Refills:  3        ONETOUCH LANCETS Misc   Quantity:  100 each        by In Vitro route 4 times daily as needed   Refills:  prn        phenytoin 30 MG CR capsule   Commonly known as:  DILANTIN   Dose:  120 mg   Quantity:  720 capsule        Take 4 capsules (120 mg) by mouth 2 times daily   Refills:  11        pravastatin 80 MG tablet   Commonly known as:  PRAVACHOL   Dose:  80 mg   Quantity:  90 tablet        Take 1 tablet (80 mg) by mouth daily   Refills:  3        spironolactone 25 MG tablet   Commonly known as:  ALDACTONE   Dose:  50 mg   Quantity:  180 tablet        Take 2 tablets (50 mg) by mouth daily   Refills:  1        thiamine 100 MG tablet   Dose:  100 mg   Quantity:  100 tablet        Take 1 tablet (100 mg) by mouth daily   Refills:  1        traZODone 50 MG tablet   Commonly known as:  DESYREL   Dose:  50 mg   Quantity:  30 tablet        Take 1 tablet (50 mg) by mouth nightly as needed for sleep   Refills:  2        Vitamin D (Cholecalciferol) 1000 units Tabs   Dose:  1 tablet        Take 1 tablet by mouth daily    Refills:  0        XIFAXAN 550 MG Tabs tablet   Quantity:  60 tablet   Generic drug:  rifaximin        TAKE ONE TABLET BY MOUTH TWICE DAILY   Refills:  11        * Notice:  This list has 3 medication(s) that are the same as other medications prescribed for you. Read the directions carefully, and ask your doctor or other care provider to review them with you.            Prescriptions were sent or printed at these locations (2 Prescriptions)                   Other Prescriptions                Printed at Department/Unit printer (2 of 2)         cephALEXin (KEFLEX) 500 MG capsule               levofloxacin (LEVAQUIN) 500 MG tablet                Procedures and tests performed during your visit     Ammonia    CBC with platelets differential    Comprehensive metabolic panel    INR    Lactic acid whole blood    Peripheral IV catheter    UA with Microscopic reflex to Culture    US Testicular & Scrotum w Doppler Ltd    XR Hand Left G/E 3 Views    XR Knee Right 1/2 Views      Orders Needing Specimen Collection     None      Pending Results     No orders found from 10/1/2018 to 10/4/2018.            Pending Culture Results     No orders found from 10/1/2018 to 10/4/2018.            Pending Results Instructions     If you had any lab results that were not finalized at the time of your Discharge, you can call the ED Lab Result RN at [phone]. You will be contacted by this team for any positive Lab results or changes in treatment. The nurses are available 7 days a week from 10A to 6:30P.  You can leave a message 24 hours per day and they will return your call.        Thank you for choosing [facility]       Thank you for choosing [facility] for your care. Our goal is always to provide you with excellent care. Hearing back from our patients is one way we can continue to improve our services. Please take a few minutes to complete the written survey that you may receive in the mail after you visit with us. Thank you!         [email or web address] Information     [email or web address] gives you secure access to your electronic health record. If you see a primary care provider, you can also send messages to your care team and make appointments. If you have questions, please call your primary care clinic.  If you do not have a primary care provider, please call [phone] and they will assist you.        Care EveryWhere ID     This is your Care EveryWhere ID. This could be used by other organizations to access your [facility] medical records  [id number]        Equal Access to Services     SAMUEL FAIR : Hadward lirao Sojanna, waaxda luqadaha, qaybta kaalmada kamla, josue tamayo. So [facility] [phone].    ATENCIÓN: Si habla español, tiene a chiang disposición servicios gratuitos de asistencia lingüística. Llame al [phone].    We comply with applicable federal civil rights laws and [place] laws. We do not discriminate on the basis of race, color, national origin, age, disability, sex, sexual orientation, or gender identity.            After Visit Summary       This is your record. Keep this with you and show to your community pharmacist(s) and doctor(s) at your next visit.

## 2018-10-03 NOTE — ED NOTES
Bed: ED08  Expected date: 10/3/18  Expected time:   Means of arrival:   Comments:  Mono roxy, 1970. 55 yo male with stg 4 brain Ca and liver Ca. Coming in tonight with generalized weakness. Pt usually uses walker to walk but tried to walk without it and fell and was unable to get up. Pt has rectal varices and some bleeding.

## 2018-10-03 NOTE — PROGRESS NOTES
Service Date: 10/02/2018      King Louis MD   Primary Care Center    [address] [place] [id number]      RE: Mono Varghese   MRN: [id number]   : 1970      Dear Dr. Louis:      I have his cousin on the phone.  I saw Mono Varghese for followup of his seizures.  He has a history of seizure but has not had anything for years.  We finally adjusted his medication to Dilantin which would be 120 twice a day.  The last concentration I have on the Dilantin is for a while.  He comes back for general, but really has not had a seizure for a long time.  On , his Dilantin level was 9.9.      He has been failing significantly.  His mother-in-law who is a geriatric nurse is with him today.  He has not been eating, he has been depressed.  He has had some choreoathetoid like movements which are of some concern.  He was recently hospitalized with anemia which has been a chronic affair with leukopenia and thrombocytopenia and one could not put exact etiology on that, although Dr. 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  This has been a current problem, but he is not drinking.  He has lost an awful lot of weight.  He looks thin and just generally has been deteriorating.      When I interview him, he is disoriented as to date and time and he is encephalopathic.  He does have some jerking of the hands and some soft choreoathetoid like movements of the hands.  His eye movements are good.  Face is symmetrical.  He really shows mild weakness of the right leg but no major deficit.  He has had a left parietal tumor removed and the last MRI of this was not recurrent.      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  The blood picture is a little worrisome as he has anemia and leukopenia and thrombocytopenia.  I wonder if Dilantin is playing a role here, which it certainly can.  We  will change him over to carbamazepine from Dilantin and start him on 200 b.i.d. of carbamazepine and then after a week, start tapering the Dilantin by 30 mg a day.  He is getting some help and very little and his mother-in-law has been helping out.      She will be monitoring the situation.  She is a very good resource.  I wonder if the blood picture could be partly related to phenytoin toxicity.  He has had no seizures and the tumor has not recurred.      Sincerely,       MD SARA Romeo MD             D: 10/02/2018   T: 10/03/2018   MT: ANGÉLICA      Name:     SALO MADERA   MRN:      [id number]        Account:      [id number]   :      1970           Service Date: 10/02/2018      Document: [id number]

## 2018-10-03 NOTE — DISCHARGE INSTRUCTIONS
Thank you for coming to the [facility] Emergency Department.     For scrotal swelling:  Keep the scrotum elevated on a towel when laying down in bed.   Apply talc powder to the skin folds to prevent moisture.   Examine the scrotum daily. Come to the ER if you notice increasing redness, skin wounds, necrotic (black or blue discolored) skin, increasing pain.     Start the antibiotics for 7 days:  Keflex--For possible skin infection  Levofloxacin--For epididymitis    Follow up with Urology regarding epididymitis seen on ultrasound. Please make an appointment to follow up with Urology Clinic (phone: [phone]) in 7 days.

## 2018-10-03 NOTE — ED AVS SNAPSHOT
[facility] [place], Emergency Department    [address] [place] [address]    Phone:  [phone]                                       Mono Varghese   MRN: [id number]    Department:  [facility], Emergency Department   Date of Visit:  10/3/2018           After Visit Summary Signature Page     I have received my discharge instructions, and my questions have been answered. I have discussed any challenges I see with this plan with the nurse or doctor.    ..........................................................................................................................................  Patient/Patient Representative Signature      ..........................................................................................................................................  Patient Representative Print Name and Relationship to Patient    ..................................................               ................................................  Date                                   Time    ..........................................................................................................................................  Reviewed by Signature/Title    ...................................................              ..............................................  Date                                               Time          22EPIC Rev 08/18

## 2018-10-03 NOTE — PROGRESS NOTES
"Message from Dr. Kelly to organize the following:  Please schedule him on 10/15 for rectal EUS with plans to perform glueing and coiling       WE NEED A FULL COLONOSCOPY PREP for this procedure as the enemas were hardly sufficient. I have explained this to his mother in law who is the only one who is healthy enough to take care of him. He has traumatic brain injury and does not remember instruction                Order placed for rectal EUS and sent to scheduling.   Called and spoke to Yisel, she is advised of colon prep. Explained prep instructions, Yisel the wife did not want to start the prep in the afternoon, she states it \"cruel to make him poop all night\". Advised that she can start the prep earlier in the day- at noon and then wait 2-4 hours and finish the prep. Detailed prep instructions sent via [employer].   He fell last night and was sent via ambulance and was assessed. He went to the [facility]. They did a rectal exam and they noted blood then and again when he was home and noted bright red blood.   Advised will send a message to Dr. Kelly for update.     Shalini GERMAN RN Coordinator  Dr. Aragon, Dr. Dickens & Dr. Kelly   Advanced Endoscopy  [phone]      "

## 2018-10-05 NOTE — TELEPHONE ENCOUNTER
Verbal orders given to Shavonne from [facility], per Dr. Louis, for speech therapy 5x month for 1 month. Tiffanie Lentz LPN 10/5/2018 1:04 PM

## 2018-10-05 NOTE — TELEPHONE ENCOUNTER
Health Call Center    Phone Message    May a detailed message be left on voicemail: yes    Reason for Call: Other: Per call from Angelique due to PT's multiple falls, a wheel chair needs to be sent out to the PT today.  Per Angelique [facility] has faxed over forms re: wheel chair order and needs signature and can be faxed back to [phone].  Angelique is requesting a call back to get updates     Action Taken: Message routed to:  [facility] ([facility] Neurology

## 2018-10-05 NOTE — TELEPHONE ENCOUNTER
[facility] Call Center    Phone Message    May a detailed message be left on voicemail: yes    Reason for Call: Other: Shavonne speech therapist calling for speech therapy 5x month for 1 month  she did send documentation in the pt chart as well.     Action Taken: Message routed to:  [facility] Primary Care    Verbal order given and documented.Tiffanie Lentz LPN 10/5/2018 1:03 PM

## 2018-10-05 NOTE — TELEPHONE ENCOUNTER
[facility] Call Center    Phone Message    May a detailed message be left on voicemail: no    Reason for Call: Other: Angelique from  Homecare requesting call back to discuss order for wheelchair     Action Taken: Message routed to:  [facility] neuro clinic

## 2018-10-05 NOTE — PROGRESS NOTES
Called Mono again and talked to Yisel to follow-up on his report of bright red blood from his rectum last time we spoke.   She states he has not had since we last spoke and that they have been monitoring. She is advised to keep monitoring and if he does bleed again then we would want to know about it. If he has significant bleeding then that would be a reason for him to go to the ED for evaluation. Verbalized understanding.     Message routed to Dr. Kelly with update.     Shalini GERMAN RN Coordinator  Dr. Aragon, Dr. Dickens & Dr. Kelly   [facility]  [phone]

## 2018-10-08 NOTE — TELEPHONE ENCOUNTER
Nurse received faxed request for Wheelchair orders.  Nurse returned forms via fax and indicated on request form that it needs to go to PCP

## 2018-10-08 NOTE — TELEPHONE ENCOUNTER
Can you talk to Urology contacts and see if anyway to get him in next few days? If he can, and I'm in clinic, I'd see him then too.

## 2018-10-08 NOTE — ED PROVIDER NOTES
History     Chief Complaint   Patient presents with     Fall     Generalized Weakness     HPI  Mono Varghese is a 48 year old male with a hx of astrocytoma s/p resection and alcoholic cirrhosis. He is generally weak and has a hx of episodic falls. Hx presented by the pt's wife who cares for him at home. Tonight he attempted to get out of bed without assistance and had a fall on to the left side. No head injury or LOC noted. This is his second fall in the last few days. Since the first fall they have noticed an increase in the size of his scrotum. It is not obviously painful to him. He has ascites but has not had scrotal edema with it previously. No blood in urine. No abdominal pain.     Has a hx of rectal varices with occasional passage or red blood per rectum. Recent attempt at banding procedure, which was not performed due inadequate preparation.     I have reviewed the Medications, Allergies, Past Medical and Surgical History, and Social History in the [employer] system.    Past Medical History:   Diagnosis Date     ADHD      Alcoholic cirrhosis of liver with ascites (H) 06/17/2015    cirrhosis secondary to alcohol, complicated by variceal bleeding and hepatic encephalopathy      Ascites due to alcoholic cirrhosis (H)     Requiring regular paracentesis.     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 Left parietal oligoastrocytoma, WHO grade 3;  predominantly astrocytic, and less than 10% of the specimen was oligodendroglioma. status post subtotal resection by Dr. J Carlos Aranda in early 06/2013. Negative for 1p/19q deletions. S/P Concurrent chemoradiation July 15 through August 23, 2013. Initiated adjuvant oral temozolomide 9/17/13; switch     H/O LENA (obstructive sleep apnea)-moderate 12/18/2012    Pt states this is not currently an issue.     Hyperlipidemia LDL goal <100 8/1/2016     Hypothyroidism 8/1/2016     Liver failure (H)      Moderate major depression (H) 10/3/2012     Obesity      Pancytopenia (H) 8/1/2016    Chronic pancytopenia secondary to liver disease since at least 2012      Partial epilepsy with impairment of consciousness (H) 05/07/2014     Portal hypertensive gastropathy (H)      Portal vein thrombosis 12/18/2015    history of left lower extremity deep vein thrombosis as well as portal vein and superior mesenteric vein thrombosis, late 2015 when he was hospitalized in the ICU and seriously ill temporary IVC filter placed in 12/2015 when he was in the ICU with deep vein thromboses and active bleeding      Pulmonary nodules 6/17/2015     Rectal bleeding      Seizures (H)      Type 2 diabetes mellitus (H) 10/3/2012       Past Surgical History:   Procedure Laterality Date     COLONOSCOPY N/A 11/27/2015    Procedure: COMBINED COLONOSCOPY, SINGLE OR MULTIPLE BIOPSY/POLYPECTOMY BY BIOPSY;  Surgeon: Ran Thurston MD;  Location: [facility]     ENDOSCOPIC ULTRASOUND UPPER GASTROINTESTINAL TRACT (GI) N/A 10/1/2018    Procedure: ENDOSCOPIC ULTRASOUND, ESOPHAGOSCOPY / UPPER GASTROINTESTINAL TRACT (GI);;  Surgeon: Guru Jose Kelly MD;  Location: [facility]     ESOPHAGOSCOPY, GASTROSCOPY, DUODENOSCOPY (EGD), COMBINED N/A 11/23/2015    Procedure: COMBINED ESOPHAGOSCOPY, GASTROSCOPY, DUODENOSCOPY (EGD);  Surgeon: Rocael Rizvi MD;  Location: [facility]     ESOPHAGOSCOPY, GASTROSCOPY, DUODENOSCOPY (EGD), COMBINED N/A 1/25/2017    Procedure:  COMBINED ESOPHAGOSCOPY, GASTROSCOPY, DUODENOSCOPY (EGD), BIOPSY SINGLE OR MULTIPLE;  Surgeon: Rocael Tejada MD;  Location: [facility]     ESOPHAGOSCOPY, GASTROSCOPY, DUODENOSCOPY (EGD), COMBINED N/A 3/30/2017    Procedure: COMBINED ESOPHAGOSCOPY, GASTROSCOPY, DUODENOSCOPY (EGD);  Surgeon: Jordana Ramirez MD;  Location: [facility]     ESOPHAGOSCOPY, GASTROSCOPY, DUODENOSCOPY (EGD), COMBINED N/A 1/19/2018    Procedure: COMBINED ESOPHAGOSCOPY, GASTROSCOPY, DUODENOSCOPY (EGD);  Upper Endoscopy with verceal banding; Flexible Sigmoidoscopy;  Surgeon: Guru Jose Kelly MD;  [facility]: [facility]     ESOPHAGOSCOPY, GASTROSCOPY, DUODENOSCOPY (EGD), COMBINED N/A 2/12/2018    Procedure: COMBINED ESOPHAGOSCOPY, GASTROSCOPY, DUODENOSCOPY (EGD);  Esophagogastroduodenoscopy with variceal banding;  Surgeon: Guru Jose Kelly MD;  [facility]     ESOPHAGOSCOPY, GASTROSCOPY, DUODENOSCOPY (EGD), COMBINED N/A 3/5/2018    Procedure: COMBINED ESOPHAGOSCOPY, GASTROSCOPY, DUODENOSCOPY (EGD);  Esophagogastroduodenoscopy  with banding of varices Latex Allergy ;  Surgeon: Guru Jose Kelly MD;  [facility]     ESOPHAGOSCOPY, GASTROSCOPY, DUODENOSCOPY (EGD), COMBINED N/A 4/16/2018    Procedure: COMBINED ESOPHAGOSCOPY, GASTROSCOPY, DUODENOSCOPY (EGD);  Upper Endoscopy  with esophageal varices banding; Latex Allergy ;  Surgeon: Guru Jose Kelly MD;  [facility]     ESOPHAGOSCOPY, GASTROSCOPY, DUODENOSCOPY (EGD), COMBINED N/A 5/30/2018    Procedure: COMBINED ESOPHAGOSCOPY, GASTROSCOPY, DUODENOSCOPY (EGD);  upper endoscopy with banding of esophageal varices. *latex Allergy*;  Surgeon: Guru Jose Kelly MD;  Location: [facility]     OPTICAL TRACKING SYSTEM CRANIOTOMY, EXCISE TUMOR, COMBINED  6/6/2013    Procedure: COMBINED OPTICAL TRACKING SYSTEM CRANIOTOMY, EXCISE TUMOR;  Left Stealth Guided Craniotomy , Tumor Resection ;  Surgeon: Manoj  "J Carlos Johnson MD;  Location:  OR     ORTHOPEDIC SURGERY      hand surgery- right     SIGMOIDOSCOPY FLEXIBLE N/A 11/24/2015    Procedure: SIGMOIDOSCOPY FLEXIBLE;  Surgeon: Rocael Rizvi MD;  [facility]     SIGMOIDOSCOPY FLEXIBLE N/A 1/19/2018    Procedure: SIGMOIDOSCOPY FLEXIBLE;;  Surgeon: Guru Jose Kelly MD;  Location:  OR     SIGMOIDOSCOPY FLEXIBLE N/A 10/1/2018    Procedure: SIGMOIDOSCOPY FLEXIBLE;;  Surgeon: Guru Jose Kelly MD;  [facility]       Family History   Problem Relation Age of Onset     Adopted: Yes     Medical History Unknown Mother      Cirrhosis Father      Medical History Unknown Brother      Medical History Unknown Brother      Medical History Unknown Brother        Social History   Substance Use Topics     Smoking status: Never Smoker     Smokeless tobacco: Never Used     Alcohol use No      Comment: Quit 01/2014       Review of Systems    Physical Exam   BP: 133/87 (Simultaneous filing. User may not have seen previous data.)  Pulse: 79  Temp: 99.5  F (37.5  C)  Resp: 18  Height: 177.8 cm (5' 10\")  Weight: 78 kg (172 lb)  SpO2: 99 %      Physical Exam   Gen: eyes open, follow commands, answers some questions  HEENT:PERRL, no facial tenderness or wounds, head atraumatic, oropharynx clear, mucous membranes moist, TMs clear bilaterally  Neck:no bony tenderness or step offs, no JVD, trachea midline  Back: no CVA tenderness, no midline bony tenderness  CV:RRR without murmurs  PULM:Clear to auscultation bilaterally  Abd:soft, nontender, moderate ascites, pelvis stable.   Rectal:skin tags, no active bleeding  : enlarged scrotum. Skin slightly erythematous without warmth or crepitus. Has a superficial abrasions to the skin of the left scrotum. Testicles and epididymis enlarged.   UE:+ radial pulses with normal perfusion. Ecchymosis to multiple knuckles on the left hand without deformity.    LE: + DP and PT pulses bilaterally, ecchymosis and mild " soft tissue swelling at the right knee. No tenderness to palpation. Trace knee joint effusion. Trace LE edema.     ED Course     ED Course     Procedures    Critical Care time:  none    Labs Ordered and Resulted from Time of ED Arrival Up to the Time of Departure from the ED   CBC WITH PLATELETS DIFFERENTIAL - Abnormal; Notable for the following:        Result Value    WBC 2.1 (*)     RBC Count 3.52 (*)     Hemoglobin 11.5 (*)     Hematocrit 34.7 (*)     RDW 15.1 (*)     Platelet Count 33 (*)     Absolute Neutrophil 1.4 (*)     Absolute Lymphocytes 0.4 (*)     All other components within normal limits   COMPREHENSIVE METABOLIC PANEL - Abnormal; Notable for the following:     Glucose 137 (*)     Calcium 8.2 (*)     Bilirubin Total 1.6 (*)     Albumin 3.0 (*)     Protein Total 6.2 (*)     Alkaline Phosphatase 366 (*)     All other components within normal limits   AMMONIA - Abnormal; Notable for the following:     Ammonia 84 (*)     All other components within normal limits   ROUTINE UA WITH MICROSCOPIC REFLEX TO CULTURE - Abnormal; Notable for the following:     Ketones Urine 5 (*)     Mucous Urine Present (*)     All other components within normal limits   INR - Abnormal; Notable for the following:     INR 1.61 (*)     All other components within normal limits   LACTIC ACID WHOLE BLOOD   PERIPHERAL IV CATHETER            Assessments & Plan (with Medical Decision Making)   47 yo M presenting after mechanical fall. Hx of recurrent falls. New scrotal swelling in the last few days. Possible traumatic vs. Infectious changes.   Vitals stable.  Chronic pancytopenia with WBC 2.1. hgb 11.5. Plts 33.   CMP with stigmata of chronic liver disease with elevated bilirubin and alkphos, low albumin and protein. Normal AST and ALT. Not markedly changed.   Lactic acid 1.0  Ammonia 84. Pt has been higher and lower than this in the past. Mental status at baseline currently, per wife. Pt is taking lactulose and having stools as  expected.   INR 1.6.   Xray of the right knee and left hand without fractures identified.   US of the scrotum performed and showed no traumatic abnormalities. Has a moderate right hydrocele and large right spermatocele with a large, heterogeneous epididymis. Concern for acute vs. Chronic epididymitis. Will treat with a course of PO levofloxacin. Added keflex due to small skin abrasion and redness, with his low WBC count making him high risk to develop skin or soft tissue infection, which could progress to dorinda's if not addressed in a timely manner. Pt's wife and I reviewed signs of worsening groin infection that would require return to the ED for reassessment, including growing wound, crepitus or signs of necrosis, increasing scrotal swelling, pain, fever, worsening erythema.   Referred to follow up with urology for epididymitis.       I have reviewed the nursing notes.    I have reviewed the findings, diagnosis, plan and need for follow up with the patient.    Discharge Medication List as of 10/3/2018  8:47 AM      START taking these medications    Details   cephALEXin (KEFLEX) 500 MG capsule Take 1 capsule (500 mg) by mouth 4 times daily for 7 days, Disp-28 capsule, R-0, Local Print      levofloxacin (LEVAQUIN) 500 MG tablet Take 1 tablet (500 mg) by mouth daily, Disp-7 tablet, R-0, Local Print             Final diagnoses:   Epididymitis   Contusion of left hand, initial encounter   Contusion of right knee, initial encounter       10/3/2018   [facility], EMERGENCY DEPARTMENT    MD JENNIFER Barroso Katrina Anne, MD  10/08/18 [id number]

## 2018-10-08 NOTE — TELEPHONE ENCOUNTER
Yisel is informed that Mono is scheduled on 10/15/2018 and 12/03/2018. She knows all instructions will be sent to Mono's Mychart.     SR 10/08/2018 @ 845 A

## 2018-10-08 NOTE — TELEPHONE ENCOUNTER
Spoke to nurse Ernst who is filling in for Meghann his regular nurse, states patient has fallen the past 5 days due to weakness. She states that patients scrotum is still very swollen, pain is 7/10, but that it has gone down. Patient has started to use a wheelchair since Friday. Tiffanie Lentz LPN 10/8/2018 4:26 PM

## 2018-10-08 NOTE — TELEPHONE ENCOUNTER
[facility] Call Center    Phone Message    May a detailed message be left on voicemail: yes    Reason for Call: Symptoms or Concerns     If patient has red-flag symptoms, warm transfer to triage line    Current symptom or concern: painful and enlarged scrotum -2 days left of antibiotics and swelling continues  Pain has decreased from 9/10 to a 7/10  C/o generalized weakness.  He fell Friday Sat and Sun    Symptoms have been present for:   Was seen in the ER for this on 10/3/18    Has patient previously been seen for this? In the ER    Date: 10/3/18    Are there any new or worsening symptoms? same      Action Taken: Message routed to:  [facility] BARI

## 2018-10-08 NOTE — TELEPHONE ENCOUNTER
Medication requested: mirtazapine (REMERON) 45 MG tablet  Last refilled: 9/10/18  Qty: 30      Last seen: 10/2/18  RTC: 3 months  Cancel: 0  No-show: 0  Next appt: 1/2/19    Refill decision:   Refilled for 30 days per protocol.

## 2018-10-09 NOTE — PROGRESS NOTES
Left message to discuss swollen scrotum and pain    Patient needs to call     Marcella Bailey RN   Care Coordinator Urology

## 2018-10-09 NOTE — TELEPHONE ENCOUNTER
Called  [facility] to reach out to patients nurse to try to figure out what is base line for patient. Waiting for call back. Tiffanie Lentz LPN 10/9/2018 9:02 AM

## 2018-10-11 NOTE — TELEPHONE ENCOUNTER
[facility] Call Center    Phone Message    May a detailed message be left on voicemail: yes    Reason for Call: Other: Pt needs an order for a manual wheelchair and cusion due to his falls. Angelique, home care nurse, is requesting Dr. Louis fax order to [facility] at [phone].      Action Taken: Message routed to:  [facility] BARI

## 2018-10-11 NOTE — PROCEDURES
Procedure Date: 09/15/2018      EEG #:  -2      DATE OF RECORDING/SERVICE DATE:  09/15/2018      TYPE OF STUDY:  This is day 2 of 24-hour video EEG.      DURATION OF STUDY:  13 hours 32 minutes 25 seconds.     CLINICAL SUMMARY:  The patient is a 47-year-old male with a history of alcoholic liver cirrhosis, ascites, hepatic encephalopathy, type 2 diabetes, thrombocytopenia who presented with altered mental status.  EEG was performed to evaluate for seizures.       TECHNICAL SUMMARY: This continuous video- EEG monitoring procedure was performed with 23 scalp electrodes in 10-20 electrode system placement, and additional scalp, precordial and other surface electrodes used for electrical referencing and artifact detection.  Video monitoring was utilized and periodically reviewed by EEG technologists and the physician for electroclinical correlations.   INTERICTAL ACTIVITY:  There was occasional nonsustained 5-7 theta Hz activity over the right posterior head region.  No clear PDR was seen over the left posterior region. There was an asymmetry with increased amplitude and more prominent delta slowing over the left hemisphere.  There was no well-organized sleep architecture such as vertex waves or sleep spindles during sleep.  Only ill-defined vertex waves were seen at times.  No epileptiform discharges were present.      CLINICAL/ICTAL EVENTS:  No electrographic or clinical seizures or paroxysmal behavioral events were recorded.      IMPRESSION:  This is an abnormal video EEG due to the presence of moderate diffuse nonspecific encephalopathy with an additional structural abnormality over the left hemisphere and breach rhythm there, consistent with structural abnormality and patient's history of craniotomy in this region.  No epileptiform discharges were present.  No seizures were recorded.         NIKOLE TAM MD             D: 10/11/2018   T: 10/11/2018   MT: STELLA      Name:     SALO MADERA   MRN:       [id number]        Account:        [id number]   :      1970           Procedure Date: 09/15/2018      Document: [id number]

## 2018-10-12 NOTE — TELEPHONE ENCOUNTER
[facility] Call Center    Phone Message    May a detailed message be left on voicemail: yes    Reason for Call: Form or Letter   Type or form/letter needing completion: Needs Amendment and Documentation in Order to get Wheel Chair And Wheel Chair Cushion. What was Sent will not work. [facility] needs it in the actual Chart Notes. Dr. Louis will have to amend the notes from 9/26/18 stating that Pt had a Face to Face Visit and in the Progress Notes Document why specifically Pt needs a wheel Chair and Wheel Chair cushion. Resend Updated Amended notes to [facility]. Any questions please call Mally from [facility] [employer] at [phone]. She also mentioned that [facility] is faxing you over the requirements which includes you have to have a Q & A in the Chart Notes. Please watch for that Fax.   Provider: Dr. Louis  Date form needed: ASAP  Once completed: Fax form to: [phone] [facility]      Action Taken: Message routed to:  [facility] P[facility]

## 2018-10-15 NOTE — OR NURSING
Writer spoke with Dr. Alcaraz, he stated he will come to PACU to see pt and then will place sign out.

## 2018-10-15 NOTE — ANESTHESIA POSTPROCEDURE EVALUATION
Patient: Mono Varghese    Procedure(s):  Rectal Endoscopic Ultrasound **Latex Allergy** with coiling and glueing of rectal varices - Wound Class: II-Clean Contaminated    Diagnosis:Gastrointestinal Bleeding   Diagnosis Additional Information: No value filed.    Anesthesia Type:  No value filed.    Note:  Anesthesia Post Evaluation    Patient location during evaluation: PACU  Patient participation: Able to fully participate in evaluation  Level of consciousness: awake and alert  Pain management: adequate  Airway patency: patent  Cardiovascular status: acceptable  Respiratory status: acceptable  Hydration status: acceptable  PONV: none     Anesthetic complications: None          Last vitals:  Vitals:    10/15/18 1430 10/15/18 1445 10/15/18 1500   BP: 116/74 120/78 121/78   Resp: 16 14 14   Temp:      SpO2: 94% 94% 94%         Electronically Signed By: Edgar Alcaraz MD  October 15, 2018  4:41 PM

## 2018-10-15 NOTE — OR NURSING
Dr. Kelly at bedside in PACU. Dr. Kelly stated that he does not need to see pt or family again in Phase II unless questions arise.

## 2018-10-15 NOTE — DISCHARGE INSTRUCTIONS
[facility]  Same-Day Surgery   Adult Discharge Orders & Instructions     For 24 hours after surgery    1. Get plenty of rest.  A responsible adult must stay with you for at least 24 hours after you leave the hospital.   2. Do not drive or use heavy equipment.  If you have weakness or tingling, don't drive or use heavy equipment until this feeling goes away.  3. Do not drink alcohol.  4. Avoid strenuous or risky activities.  Ask for help when climbing stairs.   5. You may feel lightheaded.  IF so, sit for a few minutes before standing.  Have someone help you get up.   6. If you have nausea (feel sick to your stomach): Drink only clear liquids such as apple juice, ginger ale, broth or 7-Up.  Rest may also help.  Be sure to drink enough fluids.  Move to a regular diet as you feel able.  7. You may have a slight fever. Call the doctor if your fever is over 100 F (37.7 C) (taken under the tongue) or lasts longer than 24 hours.  8. You may have a dry mouth, a sore throat, muscle aches or trouble sleeping.  These should go away after 24 hours.  9. Do not make important or legal decisions.   Call your doctor for any of the followin.  Signs of infection (fever, growing tenderness at the surgery site, a large amount of drainage or bleeding, severe pain, foul-smelling drainage, redness, swelling).    2. It has been over 8 to 10 hours since surgery and you are still not able to urinate (pass water).    3.  Headache for over 24 hours.    To contact a doctor, call Dr. Guru Kelly @ [phone]  or:        [phone] and ask for the resident on call for Gastroenterology (answered 24 hours a day)      Emergency Department:    [facility]: [phone]       (TTY for hearing impaired: [phone])

## 2018-10-15 NOTE — ANESTHESIA PREPROCEDURE EVALUATION
Anesthesia Evaluation     . Pt has had prior anesthetic.     No history of anesthetic complications          ROS/MED HX    ENT/Pulmonary:      (-) tobacco use, asthma, COPD and sleep apnea (denies)   Neurologic: Comment: Oligoastrocytoma s/p resection 2013 c/b RLE weakness 2/2 surgery    (+)seizures (well controlled) features: ?absence,    (-) CVA and TIA   Cardiovascular:     (+) Dyslipidemia, ----. : . . . :. .      (-) arrhythmias, irregular heartbeat/palpitations and valvular problems/murmurs   METS/Exercise Tolerance:  1 - Eating, dressing   Hematologic: Comments: DVT/portal vein thrombosis        Musculoskeletal:         GI/Hepatic:     (+) GERD liver disease (EtOH cirrhosis c/b GI (rectal) bleeds, enceophalopathy, portal HTN, ascites, frequent large volume paracentesis),       Renal/Genitourinary:      (-) renal disease   Endo:      (-) Type I DM, thyroid disease and obesity   Psychiatric:         Infectious Disease:         Malignancy:         Other:                     Physical Exam  Normal systems: dental    Airway   Mallampati: II  TM distance: >3 FB  Neck ROM: full    Dental     Cardiovascular   Rhythm and rate: regular and normal  (-) no weak pulses and no murmur    Pulmonary    breath sounds clear to auscultation(-) no rhonchi                    Anesthesia Plan      History & Physical Review      ASA Status:  4 .    NPO Status:  > 8 hours    Plan for General and ETT with Intravenous induction. Maintenance will be Balanced.      GETA + RSI. PIVx1. Standard ASA monitors. IV opioids. PACU postop.    Risks and benefits of anesthetic discussed with patient including sore throat, voice hoarseness, injury to vocal cords, throat, mouth, teeth, tongue, and lips from intubation; nausea/vomiting; cardiac arrest, respiratory complications, MI, stroke, blood clots, death.    Presented opportunity to answer patient and family questions. Questions addressed.        Postoperative Care      Consents  Anesthetic plan,  risks, benefits and alternatives discussed with:  Patient..                          .

## 2018-10-15 NOTE — ANESTHESIA CARE TRANSFER NOTE
Patient: Mono Varghese    Procedure(s):  Rectal Endoscopic Ultrasound **Latex Allergy** with coiling and glueing of rectal varices - Wound Class: II-Clean Contaminated    Diagnosis: Gastrointestinal Bleeding   Diagnosis Additional Information: No value filed.    Anesthesia Type:   No value filed.     Note:  Airway :Face Mask  Patient transferred to:PACU  Comments: Pt. Pink and breathing spontaneously.  Vitals stable.  Report given to oncoming nurse.  Transfer of care occurred.Handoff Report: Identifed the Patient, Identified the Reponsible Provider, Reviewed the pertinent medical history, Discussed the surgical course, Reviewed Intra-OP anesthesia mangement and issues during anesthesia, Set expectations for post-procedure period and Allowed opportunity for questions and acknowledgement of understanding      Vitals: (Last set prior to Anesthesia Care Transfer)    CRNA VITALS  10/15/2018 1323 - 10/15/2018 1405      10/15/2018             NIBP: 116/75    NIBP Mean: 88                Electronically Signed By: TIESHA Eddy CRNA  October 15, 2018  2:05 PM

## 2018-10-15 NOTE — BRIEF OP NOTE
[facility] Brief Operative Note    Pre-operative diagnosis: Gastrointestinal Bleeding    Post-operative diagnosis * No post-op diagnosis entered *   Procedure: Procedure(s):  Rectal Endoscopic Ultrasound **Latex Allergy** with coiling and glueing of rectal varices - Wound Class: II-Clean Contaminated   Surgeon: Guru Robin MD   Assistants(s): Elan Tejada MD   Estimated blood loss: None    Specimens: None   Findings:      EUS  Three columns of 5 mm varices were identified   EUS guided micor rosita coils placement was performed followed by injection of glue/lipidol    Recommendations    Will recommend surveillance at the time of EGD as previously scheduled

## 2018-10-15 NOTE — IP AVS SNAPSHOT
MRN:[id number]                      After Visit Summary   10/15/2018    Mono Varghese    MRN: [id number]           Thank you!     Thank you for choosing [facility] for your care. Our goal is always to provide you with excellent care. Hearing back from our patients is one way we can continue to improve our services. Please take a few minutes to complete the written survey that you may receive in the mail after you visit with us. Thank you!        Patient Information     Date Of Birth          1970        About your hospital stay     You were admitted on:  October 15, 2018 You last received care in the:  Same Day Surgery [facility]    You were discharged on:  October 15, 2018       Who to Call     For medical emergencies, please call 911.  For non-urgent questions about your medical care, please call your primary care provider or clinic, [phone]  For questions related to your surgery, please call your surgery clinic        Attending Provider     Provider Guru Jose So MD Gastroenterology       Primary Care Provider Office Phone # Fax #    [place] [phone]      After Care Instructions     Discharge Instructions       Resume pre procedure diet            Discharge Instructions       Restart home medications.                  Your next 10 appointments already scheduled     Nov 21, 2018 10:45 AM CST   MR ABDOMEN W/O & W CONTRAST with [address] [facility] ([facility])    [address] [place] [address] [phone]           How do I prepare for my exam? (Food and drink instructions) Do not eat or drink for 6 hours prior to exam. **If you will be receiving sedation or general anesthesia, please see special notes below.**  How do I prepare for my exam? (Other instructions) Take your medicines as usual, unless your doctor tells you not to. You may or may not  receive IV contrast for this exam pending the discretion of the Radiologist.  **If you will be receiving sedation or general anesthesia, please see special notes below.**  What should I wear: The MRI machine uses a strong magnet. Please wear clothes without metal (snaps, zippers). A sweatsuit works well, or we may give you a hospital gown. Please remove any body piercings and hair extensions before you arrive. You will also remove watches, jewelry, hairpins, wallets, dentures, partial dental plates and hearing aids. You may wear contact lenses, and you may be able to wear your rings. We have a safe place to keep your personal items, but it is safer to leave them at home.  How long does the exam take: Most tests take 30 to 60 minutes.  HOWEVER, IF YOUR DOCTOR PRESCRIBES ANESTHESIA please plan on spending four to five hours in the recovery room.  What should I bring: Bring a list of your current medicines to your exam (including vitamins, minerals and over-the-counter drugs). Also bring the results of similar scans you may have had.  Do I need a : **If you will be receiving sedation or general anesthesia, please see special notes below.**  What should I do after the exam: No Restrictions, You may resume normal activities.  What is this test: MRI (magnetic resonance imaging) uses a strong magnet and radio waves to look inside the body. An MRA (magnetic resonance angiogram) does the same thing, but it lets us look at your blood vessels. A computer turns the radio waves into pictures showing cross sections of the body, much like slices of bread. This helps us see any problems more clearly. You may receive fluid (called  contrast ) before or during your scan. The fluid helps us see the pictures better. We give the fluid through an IV (small needle in your arm).  Who should I call with questions: If you have any questions, please contact your Imaging Department exam site. Directions, parking instructions, and other  information is available on our website, [email or web address].            Dec 03, 2018   Procedure with Guru Jose Kelly MD   [facility] [place], Same Day Surgery (--)    [address]  [facility] [address]   [phone]            Jan 02, 2019  1:00 PM CST   Adult Med Follow UP with Leroy Alcaraz MD   Psychiatry Clinic ([facility])    [place] [id number]  [address] [address] [phone]            Feb 13, 2019  9:00 AM CST   Lab with  LAB   [facility] ([facility])    [address]  1st Floor  [facility] [address] [phone]            Feb 13, 2019 10:00 AM CST   (Arrive by 9:45 AM)   Return General Liver with Beatriz Tanner MD   [facility] ([facility])    [address]  [address]  [facility] [address] [phone]            Apr 26, 2019 10:00 AM CDT   MR BRAIN W/O & W CONTRAST with [address] MRI ([facility])    [address]  1st [facility] [address] [phone]           How do I prepare for my exam? (Food and drink instructions) **If you will be receiving sedation or general anesthesia, please see special notes below.**  How do I prepare for my exam? (Other instructions) Take your medicines as usual, unless your doctor tells you not to. You may or may not receive intravenous (IV) contrast for this exam pending the discretion of the Radiologist.  You do not need to do anything special to prepare.  **If you will be receiving sedation or general anesthesia, please see special notes below.**  What should I wear: The MRI machine uses a strong magnet. Please wear clothes without metal (snaps, zippers). A sweatsuit works well, or we may give you a hospital gown. Please remove any body piercings and hair extensions before you arrive. You will also remove watches, jewelry,  hairpins, wallets, dentures, partial dental plates and hearing aids. You may wear contact lenses, and you may be able to wear your rings. We have a safe place to keep your personal items, but it is safer to leave them at home.  How long does the exam take: Most tests take 30 to 60 minutes.  HOWEVER, IF YOUR DOCTOR PRESCRIBES ANESTHESIA please plan on spending four to five hours in the recovery room.  What should I bring:  Bring a list of your current medicines to your exam (including vitamins, minerals and over-the-counter drugs).  Do I need a :  **If you will be receiving sedation or general anesthesia, please see special notes below.**  What should I do after the exam: No Restrictions, You may resume normal activities.  What is this test: MRI (magnetic resonance imaging) uses a strong magnet and radio waves to look inside the body. An MRA (magnetic resonance angiogram) does the same thing, but it lets us look at your blood vessels. A computer turns the radio waves into pictures showing cross sections of the body, much like slices of bread. This helps us see any problems more clearly. You may receive fluid (called  contrast ) before or during your scan. The fluid helps us see the pictures better. We give the fluid through an IV (small needle in your arm).  Who should I call with questions:  Please call the Imaging Department at your exam site with any questions. Directions, parking instructions, and other information is available on our website, [email or web address].  How do I prepare if I m having sedation or anesthesia? **IMPORTANT** THE INSTRUCTIONS BELOW ARE ONLY FOR THOSE PATIENTS WHO HAVE BEEN TOLD THEY WILL RECEIVE SEDATION OR GENERAL ANESTHESIA DURING THEIR MRI PROCEDURE:  IF YOU WILL RECEIVE SEDATION (take medicine to help you relax during your exam): You must get the medicine from your doctor before you arrive. Bring the medicine to the exam. Do not take it at home. Arrive one hour early. Bring  someone who can take you home after the test. Your medicine will make you sleepy. After the exam, you may not drive, take a bus or take a taxi by yourself. No eating 8 hours before your exam. You may have clear liquids up until 4 hours before your exam. (Clear liquids include water, clear tea, black coffee and fruit juice without pulp.)  IF YOU WILL RECEIVE ANESTHESIA (be asleep for your exam): Arrive 1 1/2 hours early. Bring someone who can take you home after the test. You may not drive, take a bus or take a taxi by yourself. No eating 8 hours before your exam. You may have clear liquids up until 4 hours before your exam. (Clear liquids include water, clear tea, black coffee and fruit juice without pulp.)            May 03, 2019 10:45 AM CDT   (Arrive by 10:30 AM)   Return Visit with Lauro Shaw MD   [facility] ([facility])    [address]  [address] [place] [address] [phone]              Further instructions from your care team       General acute hospital  Same-Day Surgery   Adult Discharge Orders & Instructions     For 24 hours after surgery    1. Get plenty of rest.  A responsible adult must stay with you for at least 24 hours after you leave the hospital.   2. Do not drive or use heavy equipment.  If you have weakness or tingling, don't drive or use heavy equipment until this feeling goes away.  3. Do not drink alcohol.  4. Avoid strenuous or risky activities.  Ask for help when climbing stairs.   5. You may feel lightheaded.  IF so, sit for a few minutes before standing.  Have someone help you get up.   6. If you have nausea (feel sick to your stomach): Drink only clear liquids such as apple juice, ginger ale, broth or 7-Up.  Rest may also help.  Be sure to drink enough fluids.  Move to a regular diet as you feel able.  7. You may have a slight fever. Call the doctor if your fever is over 100 F (37.7 C) (taken under  "the tongue) or lasts longer than 24 hours.  8. You may have a dry mouth, a sore throat, muscle aches or trouble sleeping.  These should go away after 24 hours.  9. Do not make important or legal decisions.   Call your doctor for any of the followin.  Signs of infection (fever, growing tenderness at the surgery site, a large amount of drainage or bleeding, severe pain, foul-smelling drainage, redness, swelling).    2. It has been over 8 to 10 hours since surgery and you are still not able to urinate (pass water).    3.  Headache for over 24 hours.    To contact a doctor, call Dr. Guru Kelly @ [phone]  or:        [phone] and ask for the resident on call for Gastroenterology (answered 24 hours a day)      Emergency Department:    [facility]: [phone]       (TTY for hearing impaired: [phone])        Pending Results     No orders found from 10/13/2018 to 10/16/2018.            Admission Information     Date & Time Provider Department Dept. Phone    10/15/2018 Guru Jose Kelly MD Same Day Surgery [facility] [phone]      Your Vitals Were     Blood Pressure Temperature Respirations Height Weight Pulse Oximetry    132/80 97.4  F (36.3  C) (Oral) 14 1.778 m (5' 10\") 81.8 kg (180 lb 5.4 oz) 93%    BMI (Body Mass Index)                   25.88 kg/m2           [employer] Information     [employer] gives you secure access to your electronic health record. If you see a primary care provider, you can also send messages to your care team and make appointments. If you have questions, please call your primary care clinic.  If you do not have a primary care provider, please call [phone] and they will assist you.        Care EveryWhere ID     This is your Care EveryWhere ID. This could be used by other organizations to access your [facility] medical records  [id number]        Equal Access to Services     SAMUEL FAIR AH: olesya Holliday, " josue marioaan ah. So [facility] [phone].    ATENCIÓN: Si pam manzano, tiene a chiang disposición servicios gratuitos de asistencia lingüística. Llame al [phone].    We comply with applicable federal civil rights laws and [place] laws. We do not discriminate on the basis of race, color, national origin, age, disability, sex, sexual orientation, or gender identity.               Review of your medicines      UNREVIEWED medicines. Ask your doctor about these medicines        Dose / Directions    Calcium Carb-Cholecalciferol 500-400 MG-UNIT Tabs   Commonly known as:  calcium 500 +D   Used for:  Malnutrition (H)        Dose:  500 mg   Take 500 mg by mouth daily   Quantity:  90 tablet   Refills:  1       carBAMazepine 200 MG tablet   Commonly known as:  TEGretol   Used for:  Partial symptomatic epilepsy with complex partial seizures, not intractable, without status epilepticus (H)        Dose:  200 mg   Take 1 tablet (200 mg) by mouth 2 times daily   Quantity:  120 tablet   Refills:  11       ciclopirox 0.77 % cream   Commonly known as:  LOPROX   Used for:  Tinea pedis of both feet        Apply topically 2 times daily To feet and toenails.   Quantity:  90 g   Refills:  4       cyanocobalamin 1000 MCG Tabs        Dose:  1 tablet   Take 1 tablet by mouth daily   Refills:  0       ferrous sulfate 325 (65 Fe) MG tablet   Commonly known as:  IRON   Used for:  Other iron deficiency anemia        Dose:  1 tablet   Take 1 tablet (325 mg) by mouth 2 times daily   Quantity:  200 tablet   Refills:  3       furosemide 20 MG tablet   Commonly known as:  LASIX   Used for:  Other ascites        Dose:  20 mg   Take 1 tablet (20 mg) by mouth daily   Quantity:  30 tablet   Refills:  3       gabapentin 100 MG capsule   Commonly known as:  NEURONTIN   Used for:  Mild episode of recurrent major depressive disorder (H)        Dose:  100 mg   Take 1 capsule (100 mg) by mouth At Bedtime    Quantity:  30 capsule   Refills:  3       HYDROcodone-acetaminophen 5-325 MG per tablet   Commonly known as:  NORCO   Used for:  Brain tumor (H)        Dose:  2 tablet   Take 2 tablets by mouth every 6 hours as needed for moderate to severe pain   Quantity:  60 tablet   Refills:  0       hydrOXYzine 25 MG tablet   Commonly known as:  ATARAX   Used for:  Itching, Anxiety        Dose:  25 mg   Take 1 tablet (25 mg) by mouth 2 times daily   Quantity:  180 tablet   Refills:  1       lactulose 10 GM/15ML solution   Commonly known as:  CHRONULAC   Used for:  Hepatic encephalopathy (H)        Dose:  20 g   Take 30 mLs (20 g) by mouth 3 times daily   Quantity:  946 mL   Refills:  3       levothyroxine 88 MCG tablet   Commonly known as:  SYNTHROID/LEVOTHROID   Used for:  Hypothyroidism        Dose:  88 mcg   Take 1 tablet (88 mcg) by mouth daily   Quantity:  90 tablet   Refills:  1       lidocaine 4 % Crea cream   Commonly known as:  LMX4   Used for:  Port catheter in place        Apply topically once as needed for mild pain   Quantity:  133 g   Refills:  1       methylphenidate 10 MG tablet   Commonly known as:  RITALIN   Used for:  Attention deficit hyperactivity disorder (ADHD), unspecified ADHD type        Dose:  10 mg   Start taking on:  10/19/2018   Take 1 tablet (10 mg) by mouth 2 times daily   Quantity:  60 tablet   Refills:  0       mirtazapine 45 MG tablet   Commonly known as:  REMERON   Used for:  Major depressive disorder with single episode, in partial remission (H)        Dose:  45 mg   Take 1 tablet (45 mg) by mouth At Bedtime   Quantity:  30 tablet   Refills:  2       multivitamin, therapeutic with minerals Tabs tablet        Dose:  1 tablet   Take 1 tablet by mouth 2 times daily   Refills:  0       omeprazole 20 MG CR capsule   Commonly known as:  priLOSEC   Used for:  Gastroesophageal reflux disease without esophagitis        Dose:  40 mg   Take 2 capsules (40 mg) by mouth 2 times daily   Quantity:  360  capsule   Refills:  3       phenytoin 30 MG CR capsule   Commonly known as:  DILANTIN   Used for:  Oligoastrocytoma of parietal lobe (H)        Dose:  120 mg   Take 4 capsules (120 mg) by mouth 2 times daily   Quantity:  720 capsule   Refills:  11       pravastatin 80 MG tablet   Commonly known as:  PRAVACHOL   Used for:  High cholesterol        Dose:  80 mg   Take 1 tablet (80 mg) by mouth daily   Quantity:  90 tablet   Refills:  3       spironolactone 25 MG tablet   Commonly known as:  ALDACTONE   Used for:  Other ascites        Dose:  50 mg   Take 2 tablets (50 mg) by mouth daily   Quantity:  180 tablet   Refills:  1       thiamine 100 MG tablet   Used for:  Alcoholic cirrhosis of liver with ascites (H)        Dose:  100 mg   Take 1 tablet (100 mg) by mouth daily   Quantity:  100 tablet   Refills:  1       traZODone 50 MG tablet   Commonly known as:  DESYREL   Used for:  Primary insomnia        Dose:  50 mg   Take 1 tablet (50 mg) by mouth nightly as needed for sleep   Quantity:  30 tablet   Refills:  2       Vitamin D (Cholecalciferol) 1000 units Tabs        Dose:  1 tablet   Take 1 tablet by mouth daily   Refills:  0       XIFAXAN 550 MG Tabs tablet   Used for:  Hepatic encephalopathy (H)   Generic drug:  rifaximin        TAKE ONE TABLET BY MOUTH TWICE DAILY   Quantity:  60 tablet   Refills:  11         START taking        Dose / Directions    levofloxacin 500 MG tablet   Commonly known as:  LEVAQUIN   Used for:  S/P fine needle aspiration        Dose:  500 mg   Take 1 tablet (500 mg) by mouth daily   Quantity:  7 tablet   Refills:  0         CONTINUE these medicines which have NOT CHANGED        Dose / Directions    blood glucose monitoring test strip   Commonly known as:  ONETOUCH ULTRA   Used for:  Diabetes mellitus, type 2 (H)        Use to test blood sugars 4 times daily as needed or as directed.   Quantity:  400 each   Refills:  1       CANE, ANY MATERIAL   Used for:  Balance disorder        One cane    Quantity:  1 each   Refills:  0       ONETOUCH LANCETS Misc   Used for:  Type II or unspecified type diabetes mellitus without mention of complication, not stated as uncontrolled        by In Vitro route 4 times daily as needed   Quantity:  100 each   Refills:  prn            Where to get your medicines      These medications were sent to [facility] - [place], [place] - [address]  [address], [facility] [address]     Phone:  [phone]     levofloxacin 500 MG tablet                Protect others around you: Learn how to safely use, store and throw away your medicines at www.disposemymeds.org.        ANTIBIOTIC INSTRUCTION     You've Been Prescribed an Antibiotic - Now What?  Your healthcare team thinks that you or your loved one might have an infection. Some infections can be treated with antibiotics, which are powerful, life-saving drugs. Like all medications, antibiotics have side effects and should only be used when necessary. There are some important things you should know about your antibiotic treatment.      Your healthcare team may run tests before you start taking an antibiotic.    Your team may take samples (e.g., from your blood, urine or other areas) to run tests to look for bacteria. These test can be important to determine if you need an antibiotic at all and, if you do, which antibiotic will work best.      Within a few days, your healthcare team might change or even stop your antibiotic.    Your team may start you on an antibiotic while they are working to find out what is making you sick.    Your team might change your antibiotic because test results show that a different antibiotic would be better to treat your infection.    In some cases, once your team has more information, they learn that you do not need an antibiotic at all. They may find out that you don't have an infection, or that the antibiotic you're taking won't work against your infection. For  example, an infection caused by a virus can't be treated with antibiotics. Staying on an antibiotic when you don't need it is more likely to be harmful than helpful.      You may experience side effects from your antibiotic.    Like all medications, antibiotics have side effects. Some of these can be serious.    Let you healthcare team know if you have any known allergies when you are admitted to the hospital.    One significant side effect of nearly all antibiotics is the risk of severe and sometimes deadly diarrhea caused by Clostridium difficile (C. Difficile). This occurs when a person takes antibiotics because some good germs are destroyed. Antibiotic use allows C. diificile to take over, putting patients at high risk for this serious infection.    As a patient or caregiver, it is important to understand your or your loved one's antibiotic treatment. It is especially important for caregivers to speak up when patients can't speak for themselves. Here are some important questions to ask your healthcare team.    What infection is this antibiotic treating and how do you know I have that infection?    What side effects might occur from this antibiotic?    How long will I need to take this antibiotic?    Is it safe to take this antibiotic with other medications or supplements (e.g., vitamins) that I am taking?     Are there any special directions I need to know about taking this antibiotic? For example, should I take it with food?    How will I be monitored to know whether my infection is responding to the antibiotic?    What tests may help to make sure the right antibiotic is prescribed for me?      Information provided by:  www.cdc.gov/getsmart  U.S. Department of Health and Human Services  Centers for disease Control and Prevention  National Center for Emerging and Zoonotic Infectious Diseases  Division of Healthcare Quality Promotion             Medication List: This is a list of all your medications and when to  take them. Check marks below indicate your daily home schedule. Keep this list as a reference.      Medications           Morning Afternoon Evening Bedtime As Needed    blood glucose monitoring test strip   Commonly known as:  ONETOUCH ULTRA   Use to test blood sugars 4 times daily as needed or as directed.                                Calcium Carb-Cholecalciferol 500-400 MG-UNIT Tabs   Commonly known as:  calcium 500 +D   Take 500 mg by mouth daily                                CANE, ANY MATERIAL   One cane                                carBAMazepine 200 MG tablet   Commonly known as:  TEGretol   Take 1 tablet (200 mg) by mouth 2 times daily                                ciclopirox 0.77 % cream   Commonly known as:  LOPROX   Apply topically 2 times daily To feet and toenails.                                cyanocobalamin 1000 MCG Tabs   Take 1 tablet by mouth daily                                ferrous sulfate 325 (65 Fe) MG tablet   Commonly known as:  IRON   Take 1 tablet (325 mg) by mouth 2 times daily                                furosemide 20 MG tablet   Commonly known as:  LASIX   Take 1 tablet (20 mg) by mouth daily                                gabapentin 100 MG capsule   Commonly known as:  NEURONTIN   Take 1 capsule (100 mg) by mouth At Bedtime                                HYDROcodone-acetaminophen 5-325 MG per tablet   Commonly known as:  NORCO   Take 2 tablets by mouth every 6 hours as needed for moderate to severe pain                                hydrOXYzine 25 MG tablet   Commonly known as:  ATARAX   Take 1 tablet (25 mg) by mouth 2 times daily                                lactulose 10 GM/15ML solution   Commonly known as:  CHRONULAC   Take 30 mLs (20 g) by mouth 3 times daily                                levofloxacin 500 MG tablet   Commonly known as:  LEVAQUIN   Take 1 tablet (500 mg) by mouth daily                                levothyroxine 88 MCG tablet   Commonly known as:   SYNTHROID/LEVOTHROID   Take 1 tablet (88 mcg) by mouth daily                                lidocaine 4 % Crea cream   Commonly known as:  LMX4   Apply topically once as needed for mild pain                                methylphenidate 10 MG tablet   Commonly known as:  RITALIN   Take 1 tablet (10 mg) by mouth 2 times daily   Start taking on:  10/19/2018                                mirtazapine 45 MG tablet   Commonly known as:  REMERON   Take 1 tablet (45 mg) by mouth At Bedtime                                multivitamin, therapeutic with minerals Tabs tablet   Take 1 tablet by mouth 2 times daily                                omeprazole 20 MG CR capsule   Commonly known as:  priLOSEC   Take 2 capsules (40 mg) by mouth 2 times daily                                ONETOUCH LANCETS Misc   by In Vitro route 4 times daily as needed                                phenytoin 30 MG CR capsule   Commonly known as:  DILANTIN   Take 4 capsules (120 mg) by mouth 2 times daily                                pravastatin 80 MG tablet   Commonly known as:  PRAVACHOL   Take 1 tablet (80 mg) by mouth daily                                spironolactone 25 MG tablet   Commonly known as:  ALDACTONE   Take 2 tablets (50 mg) by mouth daily                                thiamine 100 MG tablet   Take 1 tablet (100 mg) by mouth daily                                traZODone 50 MG tablet   Commonly known as:  DESYREL   Take 1 tablet (50 mg) by mouth nightly as needed for sleep                                Vitamin D (Cholecalciferol) 1000 units Tabs   Take 1 tablet by mouth daily                                XIFAXAN 550 MG Tabs tablet   TAKE ONE TABLET BY MOUTH TWICE DAILY   Generic drug:  rifaximin

## 2018-10-15 NOTE — OR NURSING
Pt and pt's mother in law verbalize understanding of all discharge instructions and follow up. No questions or concerns at this time.  Pt's VSS, port a cath heparin locked and de accessed without complications.  Pt denies pain and nausea.  Wheelchair transportation to front of hospital to discharge home at this time.

## 2018-10-15 NOTE — IP AVS SNAPSHOT
[facility] [address] [address]    Phone:  [phone]                                       After Visit Summary   10/15/2018    Mono Varghese    MRN: [id number]           After Visit Summary Signature Page     I have received my discharge instructions, and my questions have been answered. I have discussed any challenges I see with this plan with the nurse or doctor.    ..........................................................................................................................................  Patient/Patient Representative Signature      ..........................................................................................................................................  Patient Representative Print Name and Relationship to Patient    ..................................................               ................................................  Date                                   Time    ..........................................................................................................................................  Reviewed by Signature/Title    ...................................................              ..............................................  Date                                               Time          22EPIC Rev 08/18

## 2018-10-18 PROBLEM — K76.82 HEPATIC ENCEPHALOPATHY (H): Status: ACTIVE | Noted: 2018-01-01

## 2018-10-18 NOTE — TELEPHONE ENCOUNTER
Patient's wife called wanting to inform Dr Castañeda's team that she is bringing him to the ER at the [facility].      Patient had:  Rectal Endoscopic Ultrasound **[facility]** with coiling and glueing of rectal varices N     On 10/15 and they were told to watch for signs of infection.  Yesterday the patient became confused and weak and today it is worse with elevated blood sugars  283. Today it is worse so she is bringing him to the ER and wants the team to be aware.      Kathleen M Doege RN

## 2018-10-18 NOTE — TELEPHONE ENCOUNTER
[facility] Call Center    Phone Message    May a detailed message be left on voicemail: yes    Reason for Call: Order(s): Home Care Orders: Physical Therapy (PT): Ok to continue Pt once a week for four weeks. For strength and gait training.    Action Taken: Other: ump primary

## 2018-10-18 NOTE — IP AVS SNAPSHOT
MRN:[id number]                      After Visit Summary   10/18/2018    Mono Varghese    MRN: [id number]           Thank you!     Thank you for choosing [facility] for your care. Our goal is always to provide you with excellent care. Hearing back from our patients is one way we can continue to improve our services. Please take a few minutes to complete the written survey that you may receive in the mail after you visit with us. Thank you!        Patient Information     Date Of Birth          1970        Designated Caregiver       Most Recent Value    Caregiver    Will someone help with your care after discharge? yes    Name of designated caregiver Yisel    Phone number of caregiver pt unable to state number    Caregiver address pt unable to state address      About your hospital stay     You were admitted on:  October 18, 2018 You last received care in the:  Unit [facility] [facility]    You were discharged on:  October 24, 2018        Reason for your hospital stay       Mono Varghese was hospitalized with worsening confusion found to have E Coli bacteremia (bacteria in his blood stream) likely from his recent rectal endoscopy with rectal varice coiling.                  Who to Call     For medical emergencies, please call 911.  For non-urgent questions about your medical care, please call your primary care provider or clinic, [phone]          Attending Provider     Provider Specialty    Johnathan James MD Emergency Medicine    Ce Werner DO Internal Medicine    Kaycee Lynn MD Internal Medicine    Toni Patiño MD Internal Medicine       Primary Care Provider Office Phone # Fax #    [place] [phone]      After Care Instructions     Activity       Your activity upon discharge: activity as tolerated            Diet       Follow this diet upon discharge: Orders Placed This Encounter      Calorie Counts      Snacks/Supplements Adult: Boost  "Shake; Between Meals      Room Service      Regular Diet Adult Thin Liquids (water, ice chips, juice, milk, gelatin, ice cream, etc)            Discharge Equipment: Wheelchair       The patient has a mobility limitation that significantly impairs his/her ability to participate in one or more mobility-related activities of daily living (MRADLs).  The patient's mobility limitation cannot be sufficiently resolved by the use of an appropriately fitted cane or walker  The patient's home provides adequate access between rooms, maneuvering space and surfaces for the use of the manual wheelchair provided.  Use of a manual wheelchair will significantly improve the patient's ability to participate in MRADLs and the patient will use it on a regular basis in the home  The patient has not expressed an unwillingness to use the manual wheelchair that is provided in the home.  The patient has sufficient upper extremity function and other physical and mental capabilities needed to safely self-propel the manual wheelchair that is provided in the home during a typical day, OR the patient has a caregiver who is available, willing and able to provide assistance with the wheelchair when the patient has limitations.  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  No follow up labs or test are needed.      Appointments on [place] and/or [facility] (with [facility] or [facility] provider or service). Call [phone] if you haven't heard regarding these appointments within 7 days of discharge.                  Your next 10 appointments already scheduled     Nov 21, 2018 " 10:45 AM CST   MR ABDOMEN W/O & W CONTRAST with UCMR1   [facility] ([facility])    [address]  1st Floor  [facility] [address] [phone]           How do I prepare for my exam? (Food and drink instructions) Do not eat or drink for 6 hours prior to exam. **If you will be receiving sedation or general anesthesia, please see special notes below.**  How do I prepare for my exam? (Other instructions) Take your medicines as usual, unless your doctor tells you not to. You may or may not receive IV contrast for this exam pending the discretion of the Radiologist.  **If you will be receiving sedation or general anesthesia, please see special notes below.**  What should I wear: The MRI machine uses a strong magnet. Please wear clothes without metal (snaps, zippers). A sweatsuit works well, or we may give you a hospital gown. Please remove any body piercings and hair extensions before you arrive. You will also remove watches, jewelry, hairpins, wallets, dentures, partial dental plates and hearing aids. You may wear contact lenses, and you may be able to wear your rings. We have a safe place to keep your personal items, but it is safer to leave them at home.  How long does the exam take: Most tests take 30 to 60 minutes.  HOWEVER, IF YOUR DOCTOR PRESCRIBES ANESTHESIA please plan on spending four to five hours in the recovery room.  What should I bring: Bring a list of your current medicines to your exam (including vitamins, minerals and over-the-counter drugs). Also bring the results of similar scans you may have had.  Do I need a : **If you will be receiving sedation or general anesthesia, please see special notes below.**  What should I do after the exam: No Restrictions, You may resume normal activities.  What is this test: MRI (magnetic resonance imaging) uses a strong magnet and radio waves to look inside the body. An MRA (magnetic resonance angiogram)  does the same thing, but it lets us look at your blood vessels. A computer turns the radio waves into pictures showing cross sections of the body, much like slices of bread. This helps us see any problems more clearly. You may receive fluid (called  contrast ) before or during your scan. The fluid helps us see the pictures better. We give the fluid through an IV (small needle in your arm).  Who should I call with questions: If you have any questions, please contact your Imaging Department exam site. Directions, parking instructions, and other information is available on our website, [email or web address].            Dec 03, 2018   Procedure with Guru Jose Kelly MD   [facility], [place], Same Day Surgery (--)    [address] [address]   [phone]            Jan 02, 2019  1:00 PM CST   Adult Med Follow UP with Leroy Alcaraz MD   [facility] ([facility])    [place] [id number]  [address] [address] [phone]            Feb 13, 2019  9:00 AM CST   Lab with  LAB   [facility] ([facility])    [address]  [address] [facility] [address] [phone]            Feb 13, 2019 10:00 AM CST   (Arrive by 9:45 AM)   Return General Liver with Beatriz Tanner MD   [facility] Hepatology ([facility])    [address]  [address]  [facility] [address] [phone]            Apr 26, 2019 10:00 AM CDT   MR BRAIN W/O & W CONTRAST with [address] [facility] [place] [facility] ([facility])    [address]  [address] [facility] [address] [phone]           How do I prepare for my exam? (Food and drink instructions) **If you will be receiving sedation or general anesthesia, please see special notes below.**  How do I prepare for my exam? (Other instructions) Take your medicines as usual, unless your doctor tells you  not to. You may or may not receive intravenous (IV) contrast for this exam pending the discretion of the Radiologist.  You do not need to do anything special to prepare.  **If you will be receiving sedation or general anesthesia, please see special notes below.**  What should I wear: The MRI machine uses a strong magnet. Please wear clothes without metal (snaps, zippers). A sweatsuit works well, or we may give you a hospital gown. Please remove any body piercings and hair extensions before you arrive. You will also remove watches, jewelry, hairpins, wallets, dentures, partial dental plates and hearing aids. You may wear contact lenses, and you may be able to wear your rings. We have a safe place to keep your personal items, but it is safer to leave them at home.  How long does the exam take: Most tests take 30 to 60 minutes.  HOWEVER, IF YOUR DOCTOR PRESCRIBES ANESTHESIA please plan on spending four to five hours in the recovery room.  What should I bring:  Bring a list of your current medicines to your exam (including vitamins, minerals and over-the-counter drugs).  Do I need a :  **If you will be receiving sedation or general anesthesia, please see special notes below.**  What should I do after the exam: No Restrictions, You may resume normal activities.  What is this test: MRI (magnetic resonance imaging) uses a strong magnet and radio waves to look inside the body. An MRA (magnetic resonance angiogram) does the same thing, but it lets us look at your blood vessels. A computer turns the radio waves into pictures showing cross sections of the body, much like slices of bread. This helps us see any problems more clearly. You may receive fluid (called  contrast ) before or during your scan. The fluid helps us see the pictures better. We give the fluid through an IV (small needle in your arm).  Who should I call with questions:  Please call the Imaging Department at your exam site with any questions. Directions,  parking instructions, and other information is available on our website, [email or web address].  How do I prepare if I m having sedation or anesthesia? **IMPORTANT** THE INSTRUCTIONS BELOW ARE ONLY FOR THOSE PATIENTS WHO HAVE BEEN TOLD THEY WILL RECEIVE SEDATION OR GENERAL ANESTHESIA DURING THEIR MRI PROCEDURE:  IF YOU WILL RECEIVE SEDATION (take medicine to help you relax during your exam): You must get the medicine from your doctor before you arrive. Bring the medicine to the exam. Do not take it at home. Arrive one hour early. Bring someone who can take you home after the test. Your medicine will make you sleepy. After the exam, you may not drive, take a bus or take a taxi by yourself. No eating 8 hours before your exam. You may have clear liquids up until 4 hours before your exam. (Clear liquids include water, clear tea, black coffee and fruit juice without pulp.)  IF YOU WILL RECEIVE ANESTHESIA (be asleep for your exam): Arrive 1 1/2 hours early. Bring someone who can take you home after the test. You may not drive, take a bus or take a taxi by yourself. No eating 8 hours before your exam. You may have clear liquids up until 4 hours before your exam. (Clear liquids include water, clear tea, black coffee and fruit juice without pulp.)            May 03, 2019 10:45 AM CDT   (Arrive by 10:30 AM)   Return Visit with Lauro Shaw MD   [facility] ([facility])    [address] [place] [address]  [address] [place] [address] [phone]              Additional Services     Home Care OT Referral for Hospital Discharge       OT to eval and treat  _______________________  [facility]  Phone  [phone]  Fax  [phone]  ______________________     Your provider has ordered home care - occupational therapy. If you have not been contacted within 2 days of your discharge please call the department phone number listed on the top of this document.            Home Care PT  Referral for Hospital Discharge       PT to eval and treat  _______________________  [facility]  Phone  [phone]  Fax  [phone]  ______________________      Your provider has ordered home care - physical therapy. If you have not been contacted within 2 days of your discharge please call the department phone number listed on the top of this document.            Home Care SLP Referral for Hospital Discharge       SLP to eval and treat  _______________________  [facility]  Phone  [phone]  Fax  [phone]  ______________________     Your provider has ordered home care - speech therapy. If you have not been contacted within 2 days of your discharge please call the department phone number listed on the top of this document.            Home care nursing referral       RN skilled nursing visit. RN to assess vital signs and weight, respiratory and cardiac status, hydration, nutrition and bowel status.  RN to teach medication management.    HHA to assist with ADL's.   _______________________  [facility]  Phone  [phone]  Fax  [phone]  ______________________   Resume previous home RN services     Your provider has ordered home care nursing services. If you have not been contacted within 2 days of your discharge please call the inpatient department phone number at [phone] .            Medication Therapy Management Referral       MTM referral reason            Patient has Lactulose or Rifaxamin as a PTA Med or a Discharge medication      Patient had a hospital or ED visit in last 6 months and has more than 10   PTA or Discharge medications    Patient has 5 PTA or Discharge Medications AND one of the following   diagnoses: DM,HF,COPD,AMI DX,PULM HTN       This service is designed to help you get the most from your medications.  A specially trained pharmacist will work closely with you and your doctors  to solve any problems related to your medications and to help you get the  "  best results from taking them.      The Medication Therapy Management staff will call you to schedule an appointment.                  Pending Results     Date and Time Order Name Status Description    10/23/2018 1114 Fluid Culture Aerobic Bacterial Preliminary     10/21/2018 2200 Blood culture Preliminary     10/21/2018 1255 POC US Guide for Paracentesis In process     10/21/2018 0933 Blood culture Preliminary     10/21/2018 0931 Blood culture Preliminary     10/20/2018 1308 Cytology non gyn (Fluid) In process     10/20/2018 1308 fluid culture - Aerobic Bacterial Preliminary     10/19/2018 1319 POC US Guide for Paracentesis In process             Statement of Approval     Ordered          10/24/18 1057  I have reviewed and agree with all the recommendations and orders detailed in this document.  EFFECTIVE NOW     Approved and electronically signed by:  Ruby Beyer MD             Admission Information     Date & Time Provider Department Dept. Phone    10/18/2018 Toni Patiño MD Unit [facility] [facility] [place] [phone]      Your Vitals Were     Blood Pressure Pulse Temperature Respirations Height Weight    101/71 (BP Location: Left arm) 117 97  F (36.1  C) (Oral) 16 1.778 m (5' 10\") 77.5 kg (170 lb 12.8 oz)    Pulse Oximetry BMI (Body Mass Index)                98% 24.51 kg/m2          MyChart Information     [employer] gives you secure access to your electronic health record. If you see a primary care provider, you can also send messages to your care team and make appointments. If you have questions, please call your primary care clinic.  If you do not have a primary care provider, please call [phone] and they will assist you.        Care EveryWhere ID     This is your Care EveryWhere ID. This could be used by other organizations to access your [place] [facility] medical records  [id number]        Equal Access to Services     SAMUEL FAIR AH: olesya Holliday, tricia neal " "josue cason ah. So [facility] [phone].    ATENCIÓN: Si habla jose juan, tiene a chiang disposición servicios gratuitos de asistencia lingüística. Manuelito al [phone].    We comply with applicable federal civil rights laws and [place] laws. We do not discriminate on the basis of race, color, national origin, age, disability, sex, sexual orientation, or gender identity.               Review of your medicines      START taking        Dose / Directions    amoxicillin-clavulanate 875-125 MG per tablet   Commonly known as:  AUGMENTIN   Indication:  Infection Within the Abdomen   Used for:  E coli bacteremia        Dose:  1 tablet   Take 1 tablet by mouth every 12 hours for 4 days   Quantity:  8 tablet   Refills:  0       folic acid 1 MG tablet   Commonly known as:  FOLVITE   Used for:  Protein-calorie malnutrition, unspecified severity (H)        Dose:  1 mg   Take 1 tablet (1 mg) by mouth daily   Quantity:  30 tablet   Refills:  0       order for DME   Used for:  Falls frequently, At high risk for falls, Imbalance, Limited mobility        Equipment being ordered: manual wheelchair and cushion  [employer] light weight Sx5 18x18, 17.5 inch floor height and a 2 inch 18x18 inch cushion  length of need : lifetime    Patient's height : 5/10\", weight : 172 pounds   Quantity:  1 Device   Refills:  0         CONTINUE these medicines which may have CHANGED, or have new prescriptions. If we are uncertain of the size of tablets/capsules you have at home, strength may be listed as something that might have changed.        Dose / Directions    hydrOXYzine 25 MG tablet   Commonly known as:  ATARAX   This may have changed:  when to take this   Used for:  Itching, Anxiety        Dose:  25 mg   Take 1 tablet (25 mg) by mouth 2 times daily   Quantity:  180 tablet   Refills:  1       lactulose 10 GM/15ML solution   Commonly known as:  CHRONULAC   This may have changed:    - how much to take  - when to take " this        Dose:  20 g   Take 30 mLs (20 g) by mouth 3 times daily   Quantity:  946 mL   Refills:  3       traZODone 50 MG tablet   Commonly known as:  DESYREL   This may have changed:  when to take this   Used for:  Primary insomnia        Dose:  50 mg   Take 1 tablet (50 mg) by mouth nightly as needed for sleep   Quantity:  30 tablet   Refills:  2         CONTINUE these medicines which have NOT CHANGED        Dose / Directions    blood glucose monitoring test strip   Commonly known as:  ONETOUCH ULTRA   Used for:  Diabetes mellitus, type 2 (H)        Use to test blood sugars 4 times daily as needed or as directed.   Quantity:  400 each   Refills:  1       Calcium Carb-Cholecalciferol 500-400 MG-UNIT Tabs   Commonly known as:  calcium 500 +D   Used for:  Malnutrition (H)        Dose:  500 mg   Take 500 mg by mouth daily   Quantity:  90 tablet   Refills:  1       CANE, ANY MATERIAL   Used for:  Balance disorder        One cane   Quantity:  1 each   Refills:  0       carBAMazepine 200 MG tablet   Commonly known as:  TEGretol   Used for:  Partial symptomatic epilepsy with complex partial seizures, not intractable, without status epilepticus (H)        Dose:  200 mg   Take 1 tablet (200 mg) by mouth 2 times daily   Quantity:  120 tablet   Refills:  11       ciclopirox 0.77 % cream   Commonly known as:  LOPROX   Used for:  Tinea pedis of both feet        Apply topically 2 times daily To feet and toenails.   Quantity:  90 g   Refills:  4       cyanocobalamin 1000 MCG Tabs        Dose:  1 tablet   Take 1 tablet by mouth daily   Refills:  0       ferrous sulfate 325 (65 Fe) MG tablet   Commonly known as:  IRON   Used for:  Other iron deficiency anemia        Dose:  1 tablet   Take 1 tablet (325 mg) by mouth 2 times daily   Quantity:  200 tablet   Refills:  3       furosemide 20 MG tablet   Commonly known as:  LASIX   Used for:  Other ascites        Dose:  20 mg   Take 1 tablet (20 mg) by mouth daily   Quantity:  30 tablet    Refills:  3       gabapentin 100 MG capsule   Commonly known as:  NEURONTIN   Used for:  Mild episode of recurrent major depressive disorder (H)        Dose:  100 mg   Take 1 capsule (100 mg) by mouth At Bedtime   Quantity:  30 capsule   Refills:  3       HYDROcodone-acetaminophen 5-325 MG per tablet   Commonly known as:  NORCO   Used for:  Brain tumor (H)        Dose:  2 tablet   Take 2 tablets by mouth every 6 hours as needed for moderate to severe pain   Quantity:  60 tablet   Refills:  0       levothyroxine 88 MCG tablet   Commonly known as:  SYNTHROID/LEVOTHROID   Used for:  Hypothyroidism, unspecified type        Dose:  88 mcg   Take 1 tablet (88 mcg) by mouth daily   Quantity:  90 tablet   Refills:  1       lidocaine 4 % Crea cream   Commonly known as:  LMX4   Used for:  Port catheter in place        Apply topically once as needed for mild pain   Quantity:  133 g   Refills:  1       methylphenidate 10 MG tablet   Commonly known as:  RITALIN   Used for:  Attention deficit hyperactivity disorder (ADHD), unspecified ADHD type        Dose:  10 mg   Take 1 tablet (10 mg) by mouth 2 times daily   Quantity:  60 tablet   Refills:  0       mirtazapine 45 MG tablet   Commonly known as:  REMERON   Used for:  Major depressive disorder with single episode, in partial remission (H)        Dose:  45 mg   Take 1 tablet (45 mg) by mouth At Bedtime   Quantity:  30 tablet   Refills:  2       multivitamin, therapeutic with minerals Tabs tablet        Dose:  1 tablet   Take 1 tablet by mouth 2 times daily   Refills:  0       omeprazole 20 MG CR capsule   Commonly known as:  priLOSEC   Used for:  Gastroesophageal reflux disease without esophagitis        Dose:  40 mg   Take 2 capsules (40 mg) by mouth 2 times daily   Quantity:  360 capsule   Refills:  3       ONETOUCH LANCETS Misc   Used for:  Type II or unspecified type diabetes mellitus without mention of complication, not stated as uncontrolled        by In Vitro route 4  times daily as needed   Quantity:  100 each   Refills:  prn       pravastatin 80 MG tablet   Commonly known as:  PRAVACHOL   Used for:  High cholesterol        Dose:  80 mg   Take 1 tablet (80 mg) by mouth daily   Quantity:  90 tablet   Refills:  3       spironolactone 25 MG tablet   Commonly known as:  ALDACTONE   Used for:  Other ascites        Dose:  50 mg   Take 2 tablets (50 mg) by mouth daily   Quantity:  180 tablet   Refills:  1       thiamine 100 MG tablet   Used for:  Alcoholic cirrhosis of liver with ascites (H)        Dose:  100 mg   Take 1 tablet (100 mg) by mouth daily   Quantity:  100 tablet   Refills:  1       Vitamin D (Cholecalciferol) 1000 units Tabs        Dose:  1 tablet   Take 1 tablet by mouth daily   Refills:  0       XIFAXAN 550 MG Tabs tablet   Generic drug:  rifaximin        TAKE ONE TABLET BY MOUTH TWICE DAILY   Quantity:  60 tablet   Refills:  11         STOP taking     levofloxacin 500 MG tablet   Commonly known as:  LEVAQUIN                Where to get your medicines      These medications were sent to [address] [address]     Phone:  [phone]     amoxicillin-clavulanate 875-125 MG per tablet    folic acid 1 MG tablet         These medications were sent to [facility] [address] [place] [address] [address] [address]     Phone:  [phone]     levothyroxine 88 MCG tablet         Some of these will need a paper prescription and others can be bought over the counter. Ask your nurse if you have questions.     Bring a paper prescription for each of these medications     order for DME                Protect others around you: Learn how to safely use, store and throw away your medicines at www.disposemymeds.org.        ANTIBIOTIC INSTRUCTION     You've Been Prescribed an Antibiotic - Now What?  Your healthcare team thinks that you or your loved one might have an  infection. Some infections can be treated with antibiotics, which are powerful, life-saving drugs. Like all medications, antibiotics have side effects and should only be used when necessary. There are some important things you should know about your antibiotic treatment.      Your healthcare team may run tests before you start taking an antibiotic.    Your team may take samples (e.g., from your blood, urine or other areas) to run tests to look for bacteria. These test can be important to determine if you need an antibiotic at all and, if you do, which antibiotic will work best.      Within a few days, your healthcare team might change or even stop your antibiotic.    Your team may start you on an antibiotic while they are working to find out what is making you sick.    Your team might change your antibiotic because test results show that a different antibiotic would be better to treat your infection.    In some cases, once your team has more information, they learn that you do not need an antibiotic at all. They may find out that you don't have an infection, or that the antibiotic you're taking won't work against your infection. For example, an infection caused by a virus can't be treated with antibiotics. Staying on an antibiotic when you don't need it is more likely to be harmful than helpful.      You may experience side effects from your antibiotic.    Like all medications, antibiotics have side effects. Some of these can be serious.    Let you healthcare team know if you have any known allergies when you are admitted to the hospital.    One significant side effect of nearly all antibiotics is the risk of severe and sometimes deadly diarrhea caused by Clostridium difficile (C. Difficile). This occurs when a person takes antibiotics because some good germs are destroyed. Antibiotic use allows C. diificile to take over, putting patients at high risk for this serious infection.    As a patient or caregiver, it is  important to understand your or your loved one's antibiotic treatment. It is especially important for caregivers to speak up when patients can't speak for themselves. Here are some important questions to ask your healthcare team.    What infection is this antibiotic treating and how do you know I have that infection?    What side effects might occur from this antibiotic?    How long will I need to take this antibiotic?    Is it safe to take this antibiotic with other medications or supplements (e.g., vitamins) that I am taking?     Are there any special directions I need to know about taking this antibiotic? For example, should I take it with food?    How will I be monitored to know whether my infection is responding to the antibiotic?    What tests may help to make sure the right antibiotic is prescribed for me?      Information provided by:  www.cdc.gov/getsmart  [place] Department of Health and Human Services  Centers for disease Control and Prevention  National Center for Emerging and Zoonotic Infectious Diseases  Division of Healthcare Quality Promotion             Medication List: This is a list of all your medications and when to take them. Check marks below indicate your daily home schedule. Keep this list as a reference.      Medications           Morning Afternoon Evening Bedtime As Needed    amoxicillin-clavulanate 875-125 MG per tablet   Commonly known as:  AUGMENTIN   Take 1 tablet by mouth every 12 hours for 4 days   Last time this was given:  1 tablet on 10/24/2018  8:01 AM                                      blood glucose monitoring test strip   Commonly known as:  ONETOUCH ULTRA   Use to test blood sugars 4 times daily as needed or as directed.                                   Calcium Carb-Cholecalciferol 500-400 MG-UNIT Tabs   Commonly known as:  calcium 500 +D   Take 500 mg by mouth daily                                   CANE, ANY MATERIAL   One cane                                carBAMazepine  200 MG tablet   Commonly known as:  TEGretol   Take 1 tablet (200 mg) by mouth 2 times daily   Last time this was given:  200 mg on 10/24/2018  8:01 AM                                      ciclopirox 0.77 % cream   Commonly known as:  LOPROX   Apply topically 2 times daily To feet and toenails.                                      cyanocobalamin 1000 MCG Tabs   Take 1 tablet by mouth daily   Last time this was given:  1,000 mcg on 10/24/2018  8:01 AM                                   ferrous sulfate 325 (65 Fe) MG tablet   Commonly known as:  IRON   Take 1 tablet (325 mg) by mouth 2 times daily                                   folic acid 1 MG tablet   Commonly known as:  FOLVITE   Take 1 tablet (1 mg) by mouth daily   Last time this was given:  1 mg on 10/24/2018  8:01 AM                                   furosemide 20 MG tablet   Commonly known as:  LASIX   Take 1 tablet (20 mg) by mouth daily   Last time this was given:  20 mg on 10/19/2018  9:20 AM                                   gabapentin 100 MG capsule   Commonly known as:  NEURONTIN   Take 1 capsule (100 mg) by mouth At Bedtime   Last time this was given:  100 mg on 10/18/2018  9:10 PM                                   HYDROcodone-acetaminophen 5-325 MG per tablet   Commonly known as:  NORCO   Take 2 tablets by mouth every 6 hours as needed for moderate to severe pain                                   hydrOXYzine 25 MG tablet   Commonly known as:  ATARAX   Take 1 tablet (25 mg) by mouth 2 times daily   Last time this was given:  25 mg on 10/23/2018  8:55 PM                                      lactulose 10 GM/15ML solution   Commonly known as:  CHRONULAC   Take 30 mLs (20 g) by mouth 3 times daily   Last time this was given:  20 g on 10/24/2018  8:01 AM                                         levothyroxine 88 MCG tablet   Commonly known as:  SYNTHROID/LEVOTHROID   Take 1 tablet (88 mcg) by mouth daily   Last time this was given:  88 mcg on 10/24/2018  8:01  "AM                                   lidocaine 4 % Crea cream   Commonly known as:  LMX4   Apply topically once as needed for mild pain                                   methylphenidate 10 MG tablet   Commonly known as:  RITALIN   Take 1 tablet (10 mg) by mouth 2 times daily   Last time this was given:  10 mg on 10/24/2018  8:01 AM                                      mirtazapine 45 MG tablet   Commonly known as:  REMERON   Take 1 tablet (45 mg) by mouth At Bedtime   Last time this was given:  45 mg on 10/23/2018 10:41 PM                                   multivitamin, therapeutic with minerals Tabs tablet   Take 1 tablet by mouth 2 times daily   Last time this was given:  1 tablet on 10/23/2018 12:08 PM                                      omeprazole 20 MG CR capsule   Commonly known as:  priLOSEC   Take 2 capsules (40 mg) by mouth 2 times daily   Last time this was given:  40 mg on 10/24/2018  8:01 AM                                      ONETOUCH LANCETS Misc   by In Vitro route 4 times daily as needed                                   order for DME   Equipment being ordered: manual wheelchair and cushion  [employer] light weight Sx5 18x18, 17.5 inch floor height and a 2 inch 18x18 inch cushion  length of need : lifetime    Patient's height : 5/10\", weight : 172 pounds                                pravastatin 80 MG tablet   Commonly known as:  PRAVACHOL   Take 1 tablet (80 mg) by mouth daily                                   spironolactone 25 MG tablet   Commonly known as:  ALDACTONE   Take 2 tablets (50 mg) by mouth daily   Last time this was given:  50 mg on 10/20/2018  9:06 AM                                   thiamine 100 MG tablet   Take 1 tablet (100 mg) by mouth daily   Last time this was given:  100 mg on 10/24/2018  8:01 AM                                   traZODone 50 MG tablet   Commonly known as:  DESYREL   Take 1 tablet (50 mg) by mouth nightly as needed for sleep                                   " Vitamin D (Cholecalciferol) 1000 units Tabs   Take 1 tablet by mouth daily   Last time this was given:  1,000 Units on 10/24/2018  8:01 AM                                   XIFAXAN 550 MG Tabs tablet   TAKE ONE TABLET BY MOUTH TWICE DAILY   Last time this was given:  550 mg on 10/24/2018  8:01 AM   Generic drug:  rifaximin

## 2018-10-18 NOTE — PHARMACY-ADMISSION MEDICATION HISTORY
Admission medication history interview status for the 10/18/2018 admission is complete. See Epic admission navigator for allergy information, pharmacy, prior to admission medications and immunization status.     Medication history interview sources:  Patient's wife, ERx    Changes made to PTA medication list (reason)  Added: n/a  Deleted: n/a  Changed: lactulose rx'd as 30 ml TID, patient using as 50 ml every day (provides 4 loose stools)  Trazodone from prn->qHS, hydroxyzine BID->qHS    Additional medication history information (including reliability of information, actions taken by pharmacist): Wife manages medications and knew all names and how he uses them. Doses were confirmed with ERx.    -levofloxacin for 4 more doses to complete post op course.       Prior to Admission medications    Medication Sig Last Dose Taking? Auth Provider   Calcium Carb-Cholecalciferol (CALCIUM 500 +D) 500-400 MG-UNIT TABS Take 500 mg by mouth daily 10/18/2018 at AM Yes Beatriz Tanner MD   carBAMazepine (TEGRETOL) 200 MG tablet Take 1 tablet (200 mg) by mouth 2 times daily 10/18/2018 at AM Yes Anil English MD   ciclopirox (LOPROX) 0.77 % cream Apply topically 2 times daily To feet and toenails. 10/17/2018 at PM Yes King Quiles DPM   cyanocobalamin 1000 MCG TABS Take 1 tablet by mouth daily 10/18/2018 at AM Yes Unknown, Entered By History   ferrous sulfate (IRON) 325 (65 FE) MG tablet Take 1 tablet (325 mg) by mouth 2 times daily 10/18/2018 at AM Yes Trevon Henson MD   furosemide (LASIX) 20 MG tablet Take 1 tablet (20 mg) by mouth daily 10/18/2018 at AM Yes Beatriz Tanner MD   gabapentin (NEURONTIN) 100 MG capsule Take 1 capsule (100 mg) by mouth At Bedtime 10/17/2018 at AM Yes Leroy Alcaraz MD   hydrOXYzine (ATARAX) 25 MG tablet Take 1 tablet (25 mg) by mouth 2 times daily  Patient taking differently: Take 25 mg by mouth every evening  10/17/2018 at PM Yes King Louis MD    lactulose (CHRONULAC) 10 GM/15ML solution Take 30 mLs (20 g) by mouth 3 times daily  Patient taking differently: Take 50 mLs by mouth every morning  10/18/2018 at AM Yes Maribell Bolden MD   levofloxacin (LEVAQUIN) 500 MG tablet Take 1 tablet (500 mg) by mouth daily  Patient taking differently: Take 500 mg by mouth daily  10/18/2018 at Unknown time Yes Guru Jose Kelly MD   levothyroxine (SYNTHROID/LEVOTHROID) 88 MCG tablet Take 1 tablet (88 mcg) by mouth daily 10/18/2018 at AM Yes King Louis MD   methylphenidate (RITALIN) 10 MG tablet Take 1 tablet (10 mg) by mouth 2 times daily 10/18/2018 at AM Yes Leroy Alcaraz MD   mirtazapine (REMERON) 45 MG tablet Take 1 tablet (45 mg) by mouth At Bedtime 10/17/2018 at PM Yes Yessi Danielson MD   multivitamin, therapeutic with minerals (THERA-VIT-M) TABS Take 1 tablet by mouth 2 times daily 10/18/2018 at AM Yes Reported, Patient   omeprazole (PRILOSEC) 20 MG CR capsule Take 2 capsules (40 mg) by mouth 2 times daily 10/18/2018 at AM Yes King Louis MD   pravastatin (PRAVACHOL) 80 MG tablet Take 1 tablet (80 mg) by mouth daily 10/18/2018 at AM Yes King Louis MD   spironolactone (ALDACTONE) 25 MG tablet Take 2 tablets (50 mg) by mouth daily 10/18/2018 at AM Yes Beatriz Tanner MD   thiamine (VITAMIN B-1) 100 MG tablet Take 1 tablet (100 mg) by mouth daily 10/18/2018 at AM Yes King Louis MD   traZODone (DESYREL) 50 MG tablet Take 1 tablet (50 mg) by mouth nightly as needed for sleep  Patient taking differently: Take 50 mg by mouth At Bedtime  10/17/2018 at PM Yes Leroy Alcaraz MD   Vitamin D, Cholecalciferol, 1000 units TABS Take 1 tablet by mouth daily 10/18/2018 at AM Yes Unknown, Entered By History   XIFAXAN 550 MG TABS tablet TAKE ONE TABLET BY MOUTH TWICE DAILY 10/18/2018 at AM Yes Beatriz Tanner MD   blood glucose monitoring (ONETOUCH ULTRA) test strip Use to test blood sugars 4 times  daily as needed or as directed.   King Louis MD   CANE, ANY MATERIAL One cane   King Louis MD   HYDROcodone-acetaminophen (NORCO) 5-325 MG per tablet Take 2 tablets by mouth every 6 hours as needed for moderate to severe pain More than a month  Knig oLuis MD   lidocaine (LMX4) 4 % CREA cream Apply topically once as needed for mild pain   King Louis MD   ONE TOUCH LANCETS MISC by In Vitro route 4 times daily as needed   King Louis MD         Medication history completed by:  Narciso Bailey [facility] on 10/18/2018 at 4:37 PM

## 2018-10-18 NOTE — ED PROVIDER NOTES
History     Chief Complaint   Patient presents with     Altered Mental Status     HPI  Mono Varghese is a 48 year old male with a history of type II diabetes mellitus, astrocytoma status post resection, thrombocytopenia, alcoholic cirrhosis complicated by refractory ascites, GI bleed, rectal/esophageal varices and GERD, who presents to the Emergency Department for evaluation of altered mental status and worsening generalized weakness for the past three days following his recent colonoscopy on 10/15/18. Patient is accompanied by a family member who reports that since his colonoscopy, patient has been seemingly more confused and weak. Additionally, his blood glucose has been elevated to around 238-400's, compared to his normal values around the low 100's. Patient has not had any measured fevers, however, he typically does not run fevers with infections according to the family member. Patient also has been increasingly more confused to the point of not recalling what his birthday is. He has been taking all his medications as prescribed besides not taking his lactulose today as he reportedly woke up quite late, however, he did take it yesterday. He has had about five to six bowel movements today which are liquid in consistency. Patient himself denies any abdominal pain.     I have reviewed the Medications, Allergies, Past Medical and Surgical History, and Social History in the [email or web address] system.    PAST MEDICAL HISTORY:   Past Medical History:   Diagnosis Date     ADHD      Alcoholic cirrhosis of liver with ascites (H) 06/17/2015    cirrhosis secondary to alcohol, complicated by variceal bleeding and hepatic encephalopathy      Ascites due to alcoholic cirrhosis (H)     Requiring regular paracentesis.     Chronic pain 8/1/2016     Depressive disorder 02/01/2001     Encephalopathy, hepatic (H) 7/29/2017     GERD (gastroesophageal reflux disease)      GIB (gastrointestinal bleeding) 11/23/2015    Admitted to the ICU  11/2015 for hemorrhagic shock 2/2 variceal bleed with subsequent sequelae of shock liver, multi organ dysfunction including altered mental status of unknown etiology, multiple thrombosis, decompensated liver cirrhosis, hematologic abnormalities, and JOSEF s/p banding at the end of 03/2016      H/O Oligoastrocytoma of parietal lobe (H) 06/11/2013    Presented acute onset of right-sided seizures in 4/2013. 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 status post subtotal resection by Dr. J Carlos Aranda in early 06/2013. Negative for 1p/19q deletions. S/P Concurrent chemoradiation July 15 through August 23, 2013. Initiated adjuvant oral temozolomide 9/17/13; switch     H/O LENA (obstructive sleep apnea)-moderate 12/18/2012    Pt states this is not currently an issue.     Hyperlipidemia LDL goal <100 8/1/2016     Hypothyroidism 8/1/2016     Liver failure (H)      Moderate major depression (H) 10/3/2012     Obesity      Pancytopenia (H) 8/1/2016    Chronic pancytopenia secondary to liver disease since at least 2012      Partial epilepsy with impairment of consciousness (H) 05/07/2014     Portal hypertensive gastropathy (H)      Portal vein thrombosis 12/18/2015    history of left lower extremity deep vein thrombosis as well as portal vein and superior mesenteric vein thrombosis, late 2015 when he was hospitalized in the ICU and seriously ill temporary IVC filter placed in 12/2015 when he was in the ICU with deep vein thromboses and active bleeding      Pulmonary nodules 6/17/2015     Rectal bleeding      Seizures (H)      Type 2 diabetes mellitus (H) 10/3/2012       PAST SURGICAL HISTORY:   Past Surgical History:   Procedure Laterality Date     COLONOSCOPY N/A 11/27/2015    Procedure: COMBINED COLONOSCOPY, SINGLE OR MULTIPLE BIOPSY/POLYPECTOMY BY BIOPSY;  Surgeon: Ran Thurston MD;  Location:  GI     ENDOSCOPIC ULTRASOUND LOWER  GASTROINTESTIONAL TRACT (GI) N/A 10/15/2018    Procedure: Rectal Endoscopic Ultrasound **Latex Allergy** with coiling and glueing of rectal varices;  Surgeon: Guru Jose Kelly MD;  Location: [facility]     ENDOSCOPIC ULTRASOUND UPPER GASTROINTESTINAL TRACT (GI) N/A 10/1/2018    Procedure: ENDOSCOPIC ULTRASOUND, ESOPHAGOSCOPY / UPPER GASTROINTESTINAL TRACT (GI);;  Surgeon: Guru Jose Kelly MD;  Location: [facility]     ESOPHAGOSCOPY, GASTROSCOPY, DUODENOSCOPY (EGD), COMBINED N/A 11/23/2015    Procedure: COMBINED ESOPHAGOSCOPY, GASTROSCOPY, DUODENOSCOPY (EGD);  Surgeon: Rocael Rizvi MD;  Location: [facility]     ESOPHAGOSCOPY, GASTROSCOPY, DUODENOSCOPY (EGD), COMBINED N/A 1/25/2017    Procedure: COMBINED ESOPHAGOSCOPY, GASTROSCOPY, DUODENOSCOPY (EGD), BIOPSY SINGLE OR MULTIPLE;  Surgeon: Rocael Tejada MD;  Location: [facility] GI     ESOPHAGOSCOPY, GASTROSCOPY, DUODENOSCOPY (EGD), COMBINED N/A 3/30/2017    Procedure: COMBINED ESOPHAGOSCOPY, GASTROSCOPY, DUODENOSCOPY (EGD);  Surgeon: Jordana Ramirez MD;  Location: [facility]     ESOPHAGOSCOPY, GASTROSCOPY, DUODENOSCOPY (EGD), COMBINED N/A 1/19/2018    Procedure: COMBINED ESOPHAGOSCOPY, GASTROSCOPY, DUODENOSCOPY (EGD);  Upper Endoscopy with verceal banding; Flexible Sigmoidoscopy;  Surgeon: Guru oJse Kelly MD;  Location: [facility]     ESOPHAGOSCOPY, GASTROSCOPY, DUODENOSCOPY (EGD), COMBINED N/A 2/12/2018    Procedure: COMBINED ESOPHAGOSCOPY, GASTROSCOPY, DUODENOSCOPY (EGD);  Esophagogastroduodenoscopy with variceal banding;  Surgeon: Guru Jose Kelly MD;  Location: [facility]     ESOPHAGOSCOPY, GASTROSCOPY, DUODENOSCOPY (EGD), COMBINED N/A 3/5/2018    Procedure: COMBINED ESOPHAGOSCOPY, GASTROSCOPY, DUODENOSCOPY (EGD);  Esophagogastroduodenoscopy  with banding of varices Latex Allergy ;  Surgeon: Guru Jose Kelly MD;  Location: [facility]     ESOPHAGOSCOPY, GASTROSCOPY, DUODENOSCOPY (EGD),  COMBINED N/A 4/16/2018    Procedure: COMBINED ESOPHAGOSCOPY, GASTROSCOPY, DUODENOSCOPY (EGD);  Upper Endoscopy  with esophageal varices banding; Latex Allergy ;  Surgeon: Guru Jose Kelly MD;  [facility]: [facility]     ESOPHAGOSCOPY, GASTROSCOPY, DUODENOSCOPY (EGD), COMBINED N/A 5/30/2018    Procedure: COMBINED ESOPHAGOSCOPY, GASTROSCOPY, DUODENOSCOPY (EGD);  upper endoscopy with banding of esophageal varices. *latex Allergy*;  Surgeon: Guru Jose Kelly MD;  Location: [facility]     OPTICAL TRACKING SYSTEM CRANIOTOMY, EXCISE TUMOR, COMBINED  6/6/2013    Procedure: COMBINED OPTICAL TRACKING SYSTEM CRANIOTOMY, EXCISE TUMOR;  Left Stealth Guided Craniotomy , Tumor Resection ;  Surgeon: J Carlos Aranda MD;  Location:  OR     ORTHOPEDIC SURGERY      hand surgery- right     SIGMOIDOSCOPY FLEXIBLE N/A 11/24/2015    Procedure: SIGMOIDOSCOPY FLEXIBLE;  Surgeon: Rocael Rizvi MD;  Location:  GI     SIGMOIDOSCOPY FLEXIBLE N/A 1/19/2018    Procedure: SIGMOIDOSCOPY FLEXIBLE;;  Surgeon: Guru Jose Kelly MD;  Location:  OR     SIGMOIDOSCOPY FLEXIBLE N/A 10/1/2018    Procedure: SIGMOIDOSCOPY FLEXIBLE;;  Surgeon: Guru Jose Kelly MD;  Location:  OR       FAMILY HISTORY:   Family History   Problem Relation Age of Onset     Adopted: Yes     Medical History Unknown Mother      Cirrhosis Father      Medical History Unknown Brother      Medical History Unknown Brother      Medical History Unknown Brother        SOCIAL HISTORY:   Social History   Substance Use Topics     Smoking status: Never Smoker     Smokeless tobacco: Never Used     Alcohol use No      Comment: Quit 01/2014     No current facility-administered medications for this encounter.      Current Outpatient Prescriptions   Medication     Calcium Carb-Cholecalciferol (CALCIUM 500 +D) 500-400 MG-UNIT TABS     carBAMazepine (TEGRETOL) 200 MG tablet     cyanocobalamin 1000 MCG TABS     ferrous  "sulfate (IRON) 325 (65 FE) MG tablet     furosemide (LASIX) 20 MG tablet     gabapentin (NEURONTIN) 100 MG capsule     hydrOXYzine (ATARAX) 25 MG tablet     lactulose (CHRONULAC) 10 GM/15ML solution     levofloxacin (LEVAQUIN) 500 MG tablet     levothyroxine (SYNTHROID/LEVOTHROID) 88 MCG tablet     mirtazapine (REMERON) 45 MG tablet     multivitamin, therapeutic with minerals (THERA-VIT-M) TABS     omeprazole (PRILOSEC) 20 MG CR capsule     pravastatin (PRAVACHOL) 80 MG tablet     spironolactone (ALDACTONE) 25 MG tablet     thiamine (VITAMIN B-1) 100 MG tablet     XIFAXAN 550 MG TABS tablet     blood glucose monitoring (ONETOUCH ULTRA) test strip     CANE, ANY MATERIAL     ciclopirox (LOPROX) 0.77 % cream     HYDROcodone-acetaminophen (NORCO) 5-325 MG per tablet     lidocaine (LMX4) 4 % CREA cream     [START ON 10/19/2018] methylphenidate (RITALIN) 10 MG tablet     ONE TOUCH LANCETS MISC     traZODone (DESYREL) 50 MG tablet     Vitamin D, Cholecalciferol, 1000 units TABS        Allergies   Allergen Reactions     Latex Itching and Rash     No Clinical Screening - See Comments      Coban and Surgilast cause itching     Tegaderm Transparent Dressing (Informational Only)        Review of Systems   Constitutional: Positive for fatigue. Negative for fever.   Gastrointestinal: Positive for diarrhea. Negative for abdominal pain.   Neurological: Positive for weakness (generalized).   Psychiatric/Behavioral: Positive for confusion.   All other systems reviewed and are negative.      Physical Exam   BP: 128/85  Pulse: 74  Temp: 97.7  F (36.5  C)  Resp: 16  Height: 177.8 cm (5' 10\")  Weight: 78 kg (172 lb)  SpO2: 97 %      Physical Exam   Constitutional: No distress.   HENT:   Head: Normocephalic and atraumatic.   Mouth/Throat: No oropharyngeal exudate.   Eyes: EOM are normal. Pupils are equal, round, and reactive to light. Scleral icterus is present.   Neck: Normal range of motion. Neck supple.   Cardiovascular: Normal rate.  "   Pulmonary/Chest: Effort normal. No respiratory distress.   Abdominal: Soft. He exhibits ascites.   Neurological:   Slightly disoriented but answers some questions appropriately    Skin: Skin is warm and dry. He is not diaphoretic.   jaundice       ED Course     ED Course     Procedures             Critical Care time:  none             Labs Ordered and Resulted from Time of ED Arrival Up to the Time of Departure from the ED   COMPREHENSIVE METABOLIC PANEL - Abnormal; Notable for the following:        Result Value    Glucose 283 (*)     Calcium 7.8 (*)     Albumin 2.7 (*)     Protein Total 6.0 (*)     Alkaline Phosphatase 339 (*)     All other components within normal limits   CBC WITH PLATELETS DIFFERENTIAL - Abnormal; Notable for the following:     WBC 1.9 (*)     RBC Count 3.44 (*)     Hemoglobin 11.2 (*)     Hematocrit 33.0 (*)     RDW 15.1 (*)     Platelet Count 37 (*)     Absolute Neutrophil 1.3 (*)     Absolute Lymphocytes 0.3 (*)     All other components within normal limits   INR - Abnormal; Notable for the following:     INR 1.58 (*)     All other components within normal limits   AMMONIA - Abnormal; Notable for the following:     Ammonia 147 (*)     All other components within normal limits   ROUTINE UA WITH MICROSCOPIC REFLEX TO CULTURE - Abnormal; Notable for the following:     Mucous Urine Present (*)     Hyaline Casts 7 (*)     All other components within normal limits     Results for orders placed or performed during the hospital encounter of 10/18/18   Comprehensive metabolic panel   Result Value Ref Range    Sodium 134 133 - 144 mmol/L    Potassium 3.5 3.4 - 5.3 mmol/L    Chloride 101 94 - 109 mmol/L    Carbon Dioxide 26 20 - 32 mmol/L    Anion Gap 7 3 - 14 mmol/L    Glucose 283 (H) 70 - 99 mg/dL    Urea Nitrogen 23 7 - 30 mg/dL    Creatinine 1.05 0.66 - 1.25 mg/dL    GFR Estimate 75 >60 mL/min/1.7m2    GFR Estimate If Black >90 >60 mL/min/1.7m2    Calcium 7.8 (L) 8.5 - 10.1 mg/dL    Bilirubin  Total 1.3 0.2 - 1.3 mg/dL    Albumin 2.7 (L) 3.4 - 5.0 g/dL    Protein Total 6.0 (L) 6.8 - 8.8 g/dL    Alkaline Phosphatase 339 (H) 40 - 150 U/L    ALT 28 0 - 70 U/L    AST 33 0 - 45 U/L   CBC with platelets differential   Result Value Ref Range    WBC 1.9 (L) 4.0 - 11.0 10e9/L    RBC Count 3.44 (L) 4.4 - 5.9 10e12/L    Hemoglobin 11.2 (L) 13.3 - 17.7 g/dL    Hematocrit 33.0 (L) 40.0 - 53.0 %    MCV 96 78 - 100 fl    MCH 32.6 26.5 - 33.0 pg    MCHC 33.9 31.5 - 36.5 g/dL    RDW 15.1 (H) 10.0 - 15.0 %    Platelet Count 37 (LL) 150 - 450 10e9/L    Diff Method Automated Method     % Neutrophils 68.2 %    % Lymphocytes 13.2 %    % Monocytes 10.1 %    % Eosinophils 7.4 %    % Basophils 1.1 %    % Immature Granulocytes 0.0 %    Nucleated RBCs 0 0 /100    Absolute Neutrophil 1.3 (L) 1.6 - 8.3 10e9/L    Absolute Lymphocytes 0.3 (L) 0.8 - 5.3 10e9/L    Absolute Monocytes 0.2 0.0 - 1.3 10e9/L    Absolute Eosinophils 0.1 0.0 - 0.7 10e9/L    Absolute Basophils 0.0 0.0 - 0.2 10e9/L    Abs Immature Granulocytes 0.0 0 - 0.4 10e9/L    Absolute Nucleated RBC 0.0     Platelet Estimate Confirming automated cell count    INR   Result Value Ref Range    INR 1.58 (H) 0.86 - 1.14   Ammonia (on ice)   Result Value Ref Range    Ammonia 147 (HH) 10 - 50 umol/L   UA with Microscopic reflex to Culture   Result Value Ref Range    Color Urine Yellow     Appearance Urine Clear     Glucose Urine Negative NEG^Negative mg/dL    Bilirubin Urine Negative NEG^Negative    Ketones Urine Negative NEG^Negative mg/dL    Specific Gravity Urine 1.015 1.003 - 1.035    Blood Urine Negative NEG^Negative    pH Urine 6.0 5.0 - 7.0 pH    Protein Albumin Urine Negative NEG^Negative mg/dL    Urobilinogen mg/dL Normal 0.0 - 2.0 mg/dL    Nitrite Urine Negative NEG^Negative    Leukocyte Esterase Urine Negative NEG^Negative    Source Clean catch urine     WBC Urine 1 0 - 5 /HPF    RBC Urine 0 0 - 2 /HPF    Mucous Urine Present (A) NEG^Negative /LPF    Hyaline Casts 7 (H)  0 - 2 /LPF     *Note: Due to a large number of results and/or encounters for the requested time period, some results have not been displayed. A complete set of results can be found in Results Review.            Assessments & Plan (with Medical Decision Making)   This is a 48-year-old male with a history of liver failure with a history of hepatic encephalopathy is presenting to emergency room with weakness and confusion.  Physical exam shows that he has jaundice with abdominal distention and ascites.  His abdomen is otherwise soft with no abdominal discomfort.  The rest of his exam was for the most part unremarkable other than he had difficulty answering some questions.  Reviewing his labs he does have a significant elevation in his ammonia.  He did have a recent GI procedure which may have been the nidus for this new encephalopathy.  Will be provided with lactulose here in the emergency room and admitted to the medicine service for continued care and management.    I have reviewed the nursing notes.    I have reviewed the findings, diagnosis, plan and need for follow up with the patient.    New Prescriptions    No medications on file       Final diagnoses:   Hepatic encephalopathy (H)     IYokasta, am serving as a trained medical scribe to document services personally performed by Robert James MD, based on the provider's statements to me.   Robert THOMAS MD, was physically present and have reviewed and verified the accuracy of this note documented by Yokasta Henderson.    10/18/2018   [facility], [place], EMERGENCY DEPARTMENT     Johnathan James MD  10/18/18 7601

## 2018-10-18 NOTE — IP AVS SNAPSHOT
[address] [address]    Phone:  [phone]                                       After Visit Summary   10/18/2018    Mono Varghese    MRN: [id number]           After Visit Summary Signature Page     I have received my discharge instructions, and my questions have been answered. I have discussed any challenges I see with this plan with the nurse or doctor.    ..........................................................................................................................................  Patient/Patient Representative Signature      ..........................................................................................................................................  Patient Representative Print Name and Relationship to Patient    ..................................................               ................................................  Date                                   Time    ..........................................................................................................................................  Reviewed by Signature/Title    ...................................................              ..............................................  Date                                               Time          22EPIC Rev 08/18

## 2018-10-18 NOTE — ED NOTES
[facility], [place]   ED Nurse to Floor Handoff     Mono aVrghese is a 48 year old male who speaks English and lives with a spouse,  in a home  They arrived in the ED by car from home    ED Chief Complaint: Altered Mental Status    ED Dx;   Final diagnoses:   Hepatic encephalopathy (H)         Needed?: No    Allergies:   Allergies   Allergen Reactions     Latex Itching and Rash     No Clinical Screening - See Comments      Coban and Surgilast cause itching     Tegaderm Transparent Dressing (Informational Only)    .  Past Medical Hx:   Past Medical History:   Diagnosis Date     ADHD      Alcoholic cirrhosis of liver with ascites (H) 06/17/2015    cirrhosis secondary to alcohol, complicated by variceal bleeding and hepatic encephalopathy      Ascites due to alcoholic cirrhosis (H)     Requiring regular paracentesis.     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 status post subtotal resection by Dr. J Carlos Aranda in early 06/2013. Negative for 1p/19q deletions. S/P Concurrent chemoradiation July 15 through August 23, 2013. Initiated adjuvant oral temozolomide 9/17/13; switch     H/O LENA (obstructive sleep apnea)-moderate 12/18/2012    Pt states this is not currently an issue.      Hyperlipidemia LDL goal <100 8/1/2016     Hypothyroidism 8/1/2016     Liver failure (H)      Moderate major depression (H) 10/3/2012     Obesity      Pancytopenia (H) 8/1/2016    Chronic pancytopenia secondary to liver disease since at least 2012      Partial epilepsy with impairment of consciousness (H) 05/07/2014     Portal hypertensive gastropathy (H)      Portal vein thrombosis 12/18/2015    history of left lower extremity deep vein thrombosis as well as portal vein and superior mesenteric vein thrombosis, late 2015 when he was hospitalized in the ICU and seriously ill temporary IVC filter placed in 12/2015 when he was in the ICU with deep vein thromboses and active bleeding      Pulmonary nodules 6/17/2015     Rectal bleeding      Seizures (H)      Type 2 diabetes mellitus (H) 10/3/2012      Baseline Mental status: other confusion   Current Mental Status changes: at basesline    Infection present or suspected this encounter: no  Sepsis suspected: No  Isolation type: No active isolations     Activity level - Baseline/Home:  Stand with Assist  Activity Level - Current:   Total Care    Bariatric equipment needed?: No    In the ED these meds were given:   Medications   lactulose (CHRONULAC) solution 40 g (40 g Oral Given 10/18/18 1546)       Drips running?  No    Home pump  No    Current LDAs  Port A Cath Single 01/14/14 Right Chest wall (Active)   Number of days:1738       Labs results:   Labs Ordered and Resulted from Time of ED Arrival Up to the Time of Departure from the ED   COMPREHENSIVE METABOLIC PANEL - Abnormal; Notable for the following:        Result Value    Glucose 283 (*)     Calcium 7.8 (*)     Albumin 2.7 (*)     Protein Total 6.0 (*)     Alkaline Phosphatase 339 (*)     All other components within normal limits   CBC WITH PLATELETS DIFFERENTIAL - Abnormal; Notable for the following:     WBC 1.9 (*)     RBC Count 3.44 (*)     Hemoglobin 11.2 (*)     Hematocrit 33.0 (*)     RDW 15.1 (*)     Platelet  "Count 37 (*)     Absolute Neutrophil 1.3 (*)     Absolute Lymphocytes 0.3 (*)     All other components within normal limits   INR - Abnormal; Notable for the following:     INR 1.58 (*)     All other components within normal limits   AMMONIA - Abnormal; Notable for the following:     Ammonia 147 (*)     All other components within normal limits   ROUTINE UA WITH MICROSCOPIC REFLEX TO CULTURE - Abnormal; Notable for the following:     Mucous Urine Present (*)     Hyaline Casts 7 (*)     All other components within normal limits       Imaging Studies: No results found for this or any previous visit (from the past 24 hour(s)).    Recent vital signs:   /85  Pulse 73  Temp 97.7  F (36.5  C) (Oral)  Resp 20  Ht 1.778 m (5' 10\")  Wt 78 kg (172 lb)  SpO2 95%  BMI 24.68 kg/m2    Cardiac Rhythm: Normal Sinus  Pt needs tele? No  Skin/wound Issues: jaundiced    Code Status: Full Code    Pain control: pt had none    Nausea control: pt had none    Abnormal labs/tests/findings requiring intervention: See Epic    Family present during ED course? Yes   Family Comments/Social Situation comments: Wife at bedside    Tasks needing completion: None    Yisel Gallo, ELANA  ascom-- [phone]  [phone] [facility]  [phone] [facility] ED      "

## 2018-10-19 NOTE — PROGRESS NOTES
Manual wc and cushion order (with details) was faxed to [facility] along with documentation.

## 2018-10-19 NOTE — PROGRESS NOTES
I just addended my last note with that further info as well. Soon mi if you can make that rx as they state is needed.

## 2018-10-19 NOTE — PROGRESS NOTES
I think I did all this w/ nurse Soon rupinder Camargo yesterday, mike noted addended that would need wheelchair.

## 2018-10-19 NOTE — PROGRESS NOTES
"/77 (BP Location: Left arm)  Pulse 81  Temp 98.1  F (36.7  C) (Oral)  Resp 18  Ht 1.778 m (5' 10\")  Wt 78 kg (172 lb)  SpO2 97%  BMI 24.68 kg/m2    VSS. Afebrile. 97% on RA. Pt denies chest pain or shortness of breath. Pt denies any pain during shift. Pt up w/ assist of 2 to commode w/ one loose BM during shift. No void during shift. +2 edema in feet. Stomach distended and taut. Skin yellow. R port intact. Tolerating combination diet. No nausea.  during shift. MD paged for BG/insulin orders. Continue to monitor.   "

## 2018-10-19 NOTE — PROGRESS NOTES
"CLINICAL NUTRITION SERVICES - ASSESSMENT NOTE     Nutrition Prescription    RECOMMENDATIONS FOR MDs/PROVIDERS TO ORDER:  -Consider checking Vitamin A, E, and D levels to monitor for deficiency  -Consider checking zinc as levels were low 9/5/18    Malnutrition Status:    Severe malnutrition in the context of chronic illness    Recommendations already ordered by Registered Dietitian (RD):  -addition of thera-vit-m  -Calorie counts    Future/Additional Recommendations:  -continue to monitor po intakes and weight trends  -addition of more snacks/supplements when pt mentation improves     REASON FOR ASSESSMENT  Mono Varghese is a/an 48 year old male assessed by the dietitian for Provider Order - cachexia    NUTRITION HISTORY  Visited with pt this AM.  Pt slow to respond to questions and unable to provide much detail at this time likely 2/2 encephalopathy in setting of ESLD.  Pt does note that he likes thomas wang. No family by bedside to obtain additional detail. Pt unsure if he has lost any weight recently.     CURRENT NUTRITION ORDERS  Diet: High Kcal/High Protein, 2 g Na diet  Intake/Tolerance: Per food record, 75% intake x 1 meal    LABS  Labs reviewed.  Lytes WNL  Albumin 2.6 L - likely 2/2 illness/inflammation and altered with ESLD  Bili 1.7 H, direct bili 0.9H, alk phos 311 H, ALT/AST WNL  Blood glucose 120s-150s  UA negative for ketones on admit  (10/18) Nutritional Anemia Profile: Hgb/Hct: 11.2L/33L anemia, WBC 1.9L  (9/5/18) Zinc: 43 L    MEDICATIONS  Medications reviewed. Lasix 20 mg/day, levothyroxine, ritalin,   Vitamin/mineral replacements: calcium carbonate-vitamin D, B12 1000 mcg po, folvite, thiamine    ANTHROPOMETRICS  Height: 177.8 cm (5' 10\")  Most Recent Weight: 78 kg (172 lb)    IBW: 75.5 kg  BMI: Normal BMI  Weight History:   Wt Readings from Last 20 Encounters:   10/18/18 78 kg (172 lb)   10/15/18 81.8 kg (180 lb 5.4 oz)   10/03/18 78 kg (172 lb)   10/02/18 78 kg (172 lb) "   10/01/18 78.4 kg (172 lb 13.5 oz)   09/17/18 77.2 kg (170 lb 4.8 oz)   09/06/18 80.5 kg (177 lb 7.5 oz)   08/24/18 79.7 kg (175 lb 11.2 oz)   08/01/18 83.4 kg (183 lb 12.8 oz)   07/11/18 86.5 kg (190 lb 11.2 oz)   06/22/18 84.8 kg (187 lb)   05/30/18 88.6 kg (195 lb 5.2 oz)   05/25/18 86 kg (189 lb 11.2 oz)   05/04/18 88.1 kg (194 lb 3.2 oz)   04/26/18 88.2 kg (194 lb 6.4 oz)   04/25/18 86.8 kg (191 lb 6.4 oz)   04/16/18 91 kg (200 lb 9.9 oz)   04/11/18 90.1 kg (198 lb 11.2 oz)   03/20/18 99 kg (218 lb 3.2 oz)   03/07/18 93.4 kg (206 lb)   14.3% wt loss x 6 months, significant; however, difficult to assess as pt has weekly paracentesis with 8-12L taken off at a time as weight changes in part due to fluid and likely some actual weight loss  Dosing Weight: 78 kg (actual)    ASSESSED NUTRITION NEEDS  Estimated Energy Needs: 9489-3579 kcals/day (30-35 kcals/kg)  Justification: Repletion  Estimated Protein Needs: 78-95 grams protein/day (1 - 1.2 grams of pro/kg)  Justification: Maintenance - with encephalopathy, consider increasing to 1.2-1.5 when this is improved  Estimated Fluid Needs: 0441-3269 mL/day   Justification: Maintenance and Per provider pending fluid status    PHYSICAL FINDINGS  See malnutrition section below.  Unable to complete full nutrition exam - pt wearing large hooded sweatshirt and unable to assess acromium process, b/l UE, and thoracic intercostal spaces.  Fingers pale  Hair not easily pluckable  Ascites  Dry, flaky skin on b/l LE    MALNUTRITION  % Intake: Unable to assess - pt unable to provide RD with complete nutrition history. Per HPI was having decreased intakes prior to admission  % Weight Loss: > 10% in 6 months (severe) - pt with 14.3% wt loss x 6 months, not this is partially r/t paracentesis and fluid shifts but overall weight trending down  Subcutaneous Fat Loss: Facial region:  Evident in buccal areas  Muscle Loss: Temporal:  severe, Facial & jaw region:  severe and Thoracic region  (clavicle, acromium bone, deltoid, trapezius, pectoral):  Only able to assess clavicle which was prominent  Fluid Accumulation/Edema: Moderate - b/l LE on edam  Malnutrition Diagnosis: Severe malnutrition in the context of chronic illness    NUTRITION DIAGNOSIS  Malnutrition related to likely early satiety with large volume ascites as evidenced by significant weight loss 14.3% x 6 months, physical exam      INTERVENTIONS  Implementation  Nutrition Education: Provided education on RD role. Pt not appropriate for further education at this time with mental status.   Collaboration with other providers - 7B rounds  Medical food supplement therapy - chocolate boost shakes TID  Multivitamin/mineral supplement therapy - start Thera-vit-m  *Note on MVI, drug interaction with Levaquin. Discussed with pharmacist and ok to give MVI if at least a few hours after dose. Discussed optimal dosing time for MVI and will give at noon.*    Goals  Patient to consume % of nutritionally adequate meal trays TID, or the equivalent with supplements/snacks.     Monitoring/Evaluation  Progress toward goals will be monitored and evaluated per protocol.    Ruby Roman RD, , LD.  7B Pager: [phone]

## 2018-10-19 NOTE — PROGRESS NOTES
10/19/18 0908   Quick Adds   Type of Visit Initial PT Evaluation   Living Environment   Lives With spouse   Living Arrangements house  ([facility])   Home Accessibility stairs within home   Number of Stairs to Enter Home 0   Number of Stairs Within Home 15   Stair Railings at Home inside, present on left side   Transportation Available family or friend will provide   Living Environment Comment Lives with spouse in [facility]; bedroom & bathroom on upper level of [facility] where patient spends majority of his time   Self-Care   Dominant Hand ambidextrous   Usual Activity Tolerance good   Current Activity Tolerance fair   Regular Exercise no   Equipment Currently Used at Home cane, straight;commode;grab bar;shower chair;walker, rolling   Activity/Exercise/Self-Care Comment Authorized for 4 hours of PCA care daily, but difficult to find staff; patient's mother-in-law is very involved and provides assist with cares daily (retired NP)   Functional Level Prior   Ambulation 3-->assistive equipment and person  (Simultaneous filing. User may not have seen previous data.)   Transferring 3-->assistive equipment and person   Toileting 3-->assistive equipment and person   Bathing 3-->assistive equipment and person   Dressing 2-->assistive person   Eating 0-->independent   Communication 2-->difficulty understanding (not related to language barrier)   Swallowing 0-->swallows foods/liquids without difficulty   Cognition 2 - difficulty with organizing thoughts   Fall history within last six months yes   Number of times patient has fallen within last six months 10   Which of the above functional risks had a recent onset or change? ambulation;transferring   Prior Functional Level Comment Per pt's mother-in-law; pt required supervision for mobility due to cognitive issues; they are confident with their abilty to care for pt as long as he is min assist or less   General Information   Onset of Illness/Injury or Date of Surgery - Date  10/18/18   Referring Physician Mony Gallo MD   Patient/Family Goals Statement Return home with assist from family    Pertinent History of Current Problem (include personal factors and/or comorbidities that impact the POC) Pt with long h/o medical issues, including cognitive issues but able to remain safe at home with assist; admitted thru ED on 10/18/18 with AMS & generalized weakness x 3 days with an inability to assist with mobility skills. PMH significant for ESLD c/b refractory ascites requiring every 2 week genia (reportedly removing 8-12 L each time and receiving albumin), hepatic encephalopathy, rectal and esophageal varices and PVT   Precautions/Limitations fall precautions   General Observations Pt supine upon PTs arrival; agreeable to PT session   General Info Comments Pt's mother-in-law arrived at end of session to verify home environment, prior level of function & equipment   Cognitive Status Examination   Orientation person;place   Level of Consciousness confused   Follows Commands and Answers Questions able to follow multistep instructions   Personal Safety and Judgment intact   Memory impaired   Pain Assessment   Patient Currently in Pain Yes, see Vital Sign flowsheet   Strength   Strength Comments generalized weakness, MMT not completed   Bed Mobility   Bed Mobility Comments min assist for supine to/from sit from flat bed; pt continually wanting to raise HOB   Transfer Skills   Transfer Comments Sit to/from stand from EOB, armchair with use of WW with verbal cues & SBA   Gait   Gait Comments SBA to amb with use of WW on level; verbal cues for increasing step length & gait chet; frequent standing breaks to answer or ask questions (great difficulty with dual task); tolerated amb 200 ft with WW   General Therapy Interventions   Planned Therapy Interventions bed mobility training;gait training;strengthening;transfer training   Clinical Impression   Criteria for Skilled Therapeutic Intervention yes,  "treatment indicated   PT Diagnosis gait difficulty   Influenced by the following impairments decreased strength, decreased activity tolerance; impaired cognition   Functional limitations due to impairments decreased ability to perform bed mobility, transfsers & gait; frequent falls   Clinical Presentation Evolving/Changing   Clinical Presentation Rationale changing cognitive level (improvement per family/staff), improved ability to participate with mobility skills   Clinical Decision Making (Complexity) Low complexity   Therapy Frequency` other (see comments)  (6x/week)   Predicted Duration of Therapy Intervention (days/wks) 1 week   Anticipated Discharge Disposition Home with Home Therapy   Risk & Benefits of therapy have been explained Yes   Patient, Family & other staff in agreement with plan of care Yes   [facility] TM \"6 Clicks\"   2016, Trustees of [facility], under license to [employer].  All rights reserved.   6 Clicks Short Forms Basic Mobility Inpatient Short Form   [facility]  \"6 Clicks\" V.2 Basic Mobility Inpatient Short Form   1. Turning from your back to your side while in a flat bed without using bedrails? 3 - A Little   2. Moving from lying on your back to sitting on the side of a flat bed without using bedrails? 3 - A Little   3. Moving to and from a bed to a chair (including a wheelchair)? 3 - A Little   4. Standing up from a chair using your arms (e.g., wheelchair, or bedside chair)? 3 - A Little   5. To walk in hospital room? 3 - A Little   6. Climbing 3-5 steps with a railing? 2 - A Lot   Basic Mobility Raw Score (Score out of 24.Lower scores equate to lower levels of function) 17   Total Evaluation Time   Total Evaluation Time (Minutes) 10     "

## 2018-10-19 NOTE — PROGRESS NOTES
CAPS consulted for diagnostic/therapeutic paracentesis. Spoke with patient bedside, patient refusing procedure today and his family is bedside, family wishes to speak with primary team regarding indication for paracentesis and to talk about amount taken if paracentesis were to be done inpatient. Procedure deferred today per patient/family request. This was discussed with primary team. Please contact CAPS again if patient agreeable to procedure and would like to complete.     Celsa Gregg MD  [phone]

## 2018-10-19 NOTE — PLAN OF CARE
Problem: Confusion, Acute (Adult)  Goal: Identify Related Risk Factors and Signs and Symptoms  Related risk factors and signs and symptoms are identified upon initiation of Human Response Clinical Practice Guideline (CPG).  Outcome: Improving  Pt disoriented to situation and time. VSS on RA. Voiding spontaneously, 2 loose BMs during shift. Tolerating reg diet. Denies nausea. Denies pain. Skin intact, jaundiced, distended abdomen. Up Ax2 to commode. Chest port not accessed. Patient has been resting comfortably through night. Will continue to monitor and follow POC.

## 2018-10-19 NOTE — PROGRESS NOTES
Care Coordinator Progress Note    Admission Date/Time:  10/18/2018  Attending MD:  Ce Werner DO    Data  Chart reviewed, discussed with interdisciplinary team.   Patient was admitted for: Hepatic encephalopathy (H).    Concerns with insurance coverage for discharge needs: None.  Current Living Situation: Patient lives with family and lives with spouse.  Support System: Supportive and Involved  Services Involved: DME and Home Care  Transportation at Discharge: Family or friend will provide  Transportation to Medical Appointments:   - Name of caregiver: Wife and Mother-in-law- wife Yisel and Faby (mother in law) are listed as the health care agents on this pts health care directive.    Barriers to Discharge: chronically ill, cognitively impaired, dependent with mobility/activities of daily living, physical impairment and pending medical clearance    Coordination of Care and Referrals: Provided patient/family with options for Home Care.        Assessment     Per MD team, Mono Varghese is a 48 year old male with PMH significant for ESLD c/b refractory ascites requiring every 2 week genai (reportedly removing 8-12 L each time and receiving albumin), hepatic encephalopathy, rectal and esophageal varices and PVT not on anticoagulation who presented to the ED 10/18 with worsening encephalopathy since rectal endoscopy with coiling of rectal varices on Monday. RNCC met with pt and his mother in law Faby (HC agent) and this patient and Yisel live in a [facility] in [place], [place]. Pt has a very supportive family including his mother in law.  Wife works out of the home and family assists with Taye needs on days that the wife is away from home.  Per Faby they have a walker, cane, shower chair, and commode at home, and have borrowed a wheelchair from the [facility] in [facility] which has cut down on his falls at home.  Pcp is currently working on WC DME for this pt.  Last admission PT OT recommended  discontinue to TCU, family and pt declined and the patient and wife were agreeable that discharge to home w/24hr supervision is the preferred safe discharge plan. Faby is a retired Nurse Practitioner and feels that this pt has very adequate support in the home.  The family has discussed that if pt needs 2 person transfers that would be their indication to discontinue to a NH or TCU.  24 hour caregivers are present in the home.  Pt has been approved for 4 hours daily of PCA services however the [facility] has been unable to fill this need thus far.    Resumption orders for skilled nursing visits w/home care ([facility]) and HH PT/OT/HHA/ SLP placed. RNCC to continue to follow for discharge planning needs. - Pending Attending signature.      _______________________  [facility]- RN PT OT SLP HHA  Phone  [phone]  Fax  [phone]  ______________________  (aware of pt admission and eventual need for resumption)     Per chart review clinic had sent orders to [facility] for the personal WC for this pt.  RNCC reached out to [facility] to check on this   [facility]  Ph [phone]  Fax [phone]            RX needs to be signed, length of need, need face to face notes with medicare dx/justification, pts height and weight [phone] - RNCC sent an inbasket message to PCP clinic.  I did offer pt and mother in law to initiate orders here, they declined stating they have a temp wc that is working and that they will follow up with the PCP clinic.           Plan  Anticipated Discharge Date:  TBD  Anticipated Discharge Plan:  Pt would prefer discontinue to home with family care.  Supportive family.   Family will provide discharge transportation.  PT to continue to work with pt while admitted to prevent deconditioning and maintain a max of 1 assist to stand, walk, and pivot.  PCP clinic will continue to place orders for DME for WC.       10/19/2018 12:27 PM RNCC spoke with pt  and mother in law and updated them on the [facility] DME order, RNCC paged team to sign orders, RNCC will follow up with [facility] and ensure this WC is ordered and able to be deliverd to pts home at time of discharge or next week.    [facility]  Ph [phone]  Fax [phone]            Once orders are signed and noted updated RNCC will fax orders.  Please follow up with this on Monday 10/22/18.       10/19/2018 orders signed, faxed, note completed by md team and faxed to [facility].  [facility] will follow up with pt and family for final delivery of wheelchair.  RNCC contacted [facility], whom confirmed that all orders were completed.     Belinda Cardozo, SHUKRI, BSN    [facility]    Medicine Group  [address] [place] [id number]    [email or web address]  [email or web address]    Office: [phone] Pager: [phone]  To contact weekend RNCC, dial [phone] and enter pager number [phone] at prompt. This pager can not be contacted by text page or outside line.

## 2018-10-19 NOTE — PLAN OF CARE
Problem: Patient Care Overview  Goal: Plan of Care/Patient Progress Review  PT-7B: eval completed & treatment initiated    Discharge Planner PT   Patient plan for discharge: home with assist from family & home care services (PT, OT, SLP)  Current status: min assist for supine to/from sit; SBA & verbal cues for sit to/from stand; SBA to amb with  ft on level  Barriers to return to prior living situation: stairs, confusion, medical status  Recommendations for discharge: home with home PT; assist from family (spoke with patient's mother-in-law to confirm plan)  Rationale for recommendations: will benefit from continued skilled PT intervention to address mobility deficits, improve strength & activity tolerance       Entered by: Brook Issa 10/19/2018 10:37 AM

## 2018-10-19 NOTE — PROGRESS NOTES
[facility], [place]    Internal Medicine Progress Note - [facility]    Main Plans for Today    - IM Procedure team consult placed for Paracentesis   - Ascites cell counts, cultures, cytology  - US Abdomen Complete with Dopplers  - Holding PTA lasix  - Titrating lactulose to 3-4 stools  - Reassess neuro exam following aggressive lactulose regimen    Assessment & Plan   Mono Varghese is a 48 year old male with PMH significant for left parietal astrocytoma s/p resection, ESLD c/b refractory ascites requiring every 2 week genia (reportedly removing 8-12 L each time and receiving albumin), hepatic encephalopathy, rectal and esophageal varices and PVT not on anticoagulation who presented to the ED today with worsening encephalopathy since rectal endoscopy with coiling of rectal varices on Monday.     # Encephalopathy  # EtOH cirrhosis c/b hepatic encephalopathy  Similar presentation to previous hospitalization 9/14-9/18. At that time cleared with aggressive lactulose administration. Multiple imaging studies this year with MRI / CT head/neck during last stay. No intracranial pathology discovered. Chief concern at present is for worsened hepatic encephalopathy with ammonia above baseline but unclear reason for change. Last US abdomen with dopplers showed extension of PVT. Additionally with elevated alk phos (though noted at baseline) and direct hyperbilirubinemia. Abdominal exam benign and no infectious signs but would pursue tap to r/o infection. Redemonstrated pancytopenia but worsened leukopenia. Hgb stable; no evidence of bleed. Additionally considering Wernicke's given alcohol history. Will titrate lactulose to assure adequate stooling, obtain us abdomen and re-evaluate for more advanced CNS imaging if patient fails to improve symptomatically.  MELD-Na score: 13 at 10/19/2018  7:07 AM  MELD score: 13 at 10/19/2018  7:07 AM  Calculated from:  Serum Creatinine: 1.04 mg/dL at 10/19/2018  " 7:07 AM  Serum Sodium: 137 mmol/L at 10/19/2018  7:07 AM  Total Bilirubin: 1.7 mg/dL at 10/19/2018  7:07 AM  INR(ratio): 1.51 at 10/19/2018  7:07 AM  Age: 48 years  - PT/OT consults  - IM Procedure team consult placed for Paracentesis   - Ascites cell counts, cultures, cytology  - US Abdomen Complete with Dopplers  - Continue PTA Lactulose and Rifaximin  - PRN lactulose, titrate to 3-4 soft formed stools  - High dose thiamine supplementation  - Continue PTA levofloxacin  - Holding PTA lasix  - Follow-up blood cultures  - If patient fails to clear, will evaluate for more advanced imaging / neurology workup     # Generalized weakness  # Right-sided weakness  # Left parietal astrocytoma s/p partial resection with chemo radiation  Noted with prior hospitalization. Proximal muscle weakness noted on admission H&P not demonstrated on exam this morning (improvement with lactulose?); CK 40. Imaging with last hospitalization (CT H/Neck 9/14 & MRI 9/16) without abnormality. Suspect presenting symptoms are 2/2 known structural abnormality (left parietal astrocytoma resection 2012) with unacceptability to metabolic derangement (ie locus minoris resistentiae).   - holding statin  - re-evaluate as encephalopathy clears     # History of seizures  - PTA carbamazepine     # Malnutrition  - PTA folic acid  - PTA methylphenidate  - PTA thiamine  - high protein/KCal diet  - 2g Na diet  - Nutrition consult    #Deconditioning  \"The patient has a mobility limitation that significantly impairs his/her ability to participate in one or more mobility-related activities of daily living (MRADLs). The patient's mobility limitation cannot be sufficiently resolved by the use of an appropriately fitted cane or walker The patient's home provides adequate access between rooms, maneuvering space and surfaces for the use of the manual wheelchair provided. Use of a manual wheelchair will significantly improve the patient's ability to participate in " "MRADLs and the patient will use it on a regular basis in the home The patient has not expressed an unwillingness to use the manual wheelchair that is provided in the home. The patient has sufficient upper extremity function and other physical and mental capabilities needed to safely self-propel the manual wheelchair that is provided in the home during a typical day, OR the patient has a caregiver who is available, willing and able to provide assistance with the wheelchair when the patient has limitations. 5' 10\"  10/18/18 : 78 kg (172 lb)  Length of need: 99 months  Treatment Diagnosis: Hepatic encephalopathy (h) Oligoastrocytoma of parietal lobe (h)  (primary encounter diagnosis)  Frequent falls, weakness, imbalance\"  - PT/OT Consult placed, appreciate recs    Diet: 2 g Na diet  Fluids: None  DVT Prophylaxis: Pneumatic Compression Devices; chemical contraindicated  Code Status: will address with primary care giver    Disposition Plan   Expected discharge: 2 - 3 days, recommended to prior living arrangement once adequate pain management/ tolerating PO medications and mental status at baseline.     Entered: Patrikc Aragon 10/19/2018, 9:55 AM   Information in the above section will display in the discharge planner report.      The patient's care was discussed with the attending physician, Dr. Lynn.    Patrick Aragon MD  Internal Medicine - Dermatology PGY 1  [phone]  Please see sticky note for cross cover information    Interval History   Overnight H&P, nursing notes and vitals reviewed. Continued encephalopathy. Oriented only to self; slow to find words; however, later that afternoon, was up walking with PT and was more interactive and conversational.     4 point ROS conducted, with pertinent positives/negatives discussed above.    Physical Exam   Vital Signs: Temp: 96.5  F (35.8  C) Temp src: Oral BP: 115/78 Pulse: 66   Resp: 16 SpO2: 99 % O2 Device: None (Room air)    Weight: 172 lbs 0 oz  General " Appearance: Alert and oriented only to self. Obvious word finding difficulties.  Respiratory: Clear to auscultation bilaterally.  Cardiovascular: RRR no m/r/g.  GI: Distended but soft and non-tender. Previous paracentesis bruising present.  Ext: WWP with minimal 1+ edema at ankle  Neuro: CN II-XII in tact. Right-sided dysmetria with movement toward target. No resting tremor or cog-wheeling. Strength exam of upper extremity with 5/5 proximally and distally; lower extremity with 5/5 strength proximally and distally. Worsened right lower extremity tremor / ?clonus with movement against resistance. [place] without shuffling. Normal facies. Some dis-coordination with rapid finger tapping.    Labs/Imaging/Vitals/Meds: Reviewed in Epic

## 2018-10-19 NOTE — PROGRESS NOTES
Pt needs face to face visit note addemended and faxed to [facility], [place]. [phone].  Progress note must state something similar to below if you agree.     Pt has chronic pain/, Malignant neoplasm of parietal lobe/C713, moderate protein calorie malnutrition /  all which contribute to him to being unsteady on his feet/. Pt has tried a walker and has had frequent falls. Pt is 5'10 and 172 pounds and will fit into an 18x18 chair. A 17.5 inch height will allow pt to sit on a 2 inch cushion and use his feet to assist with propelling of chair. Pt will be able to sit in chair and access his bathroom and bed room to increase safety of transfers to bed and toilet and decrease his risk of falling. Pt will be able to sit in WC to perform grooming tasks sink side and access his dresser and closet and sit while dressing. Due to expected progression of disease pt will need chair for lifetime, pt is not expected to improve.  Pt has had physical and occupational therapy throughout course of illness and still requires wheel chair. Pts home allows for use of chair for completion of IADLs.     Manual WC is of medical necessity for this pt.      RX must have pts dx w/ICD 10, name weight, MD signature, date, NPI , length of need/lifetime.     I recommend [employer] light weight Sx5 18x18, 17.5 inch floor height and a 2 inch 18x18 inch cushion.     Please if any questions call me Angelique Spencer OTRL T  or call [facility] directly as they can best answer any questions .

## 2018-10-19 NOTE — H&P
[facility], [place]    Internal Medicine History and Physical - [facility] Night Service       Date of Admission:  10/18/2018    Chief Complaint   Confusion    History is obtained from the patient and patient's spouse    History of Present Illness   Mono Varghese is a 48 year old male with PMH significant for ESLD c/b refractory ascites requiring every 2 week genia (reportedly removing 8-12 L each time and receiving albumin), hepatic encephalopathy, rectal and esophageal varices and PVT not on anticoagulation who presented to the ED today with worsening encephalopathy since rectal endoscopy with coiling of rectal varices on Monday.    Monday, Mr. Varghese had a rectal endoscopic ultrasound with coiling and gluing of rectal varices and was started on levofloxacin for infection prophylaxis after the procedure. Tuesday, he developed lower extremity edema, which was new for him. Then Wednesday he became more weak, worse on the right side, confused, and was unable to stand on his own. Thursday he was not making sense when speaking, much more confused than the day prior, sleeping more than normal, and required two people to help him stand. His wife called Dr. Kelly, who performed the procedure, and he was concerned about infection and encouraged them to come to the ED.     Here they report decreased PO intake, increased fatigue and weakness. He normally takes 50-60 ml lactulose/day and having 4+ BM daily. He missed his lactulose dose this morning because he was sleeping, and had not had any BM PTA. Report chills. Deny nausea, vomiting, fevers, night sweats, BRBPR, chest pain, shortness of breath, change in chronic cough, pain and burning with urination.    Of note, over the past three months he has developed parkinsonian symptoms including finger movement and making guttural noises. He also has right-sided vision loss that has developed, but is unchanged in the past few days, and  right-sided weakness that has worsened this week.      Review of Systems   The 10 point Review of Systems is negative other than noted in the HPI    Past Medical History    Past Medical History:   Diagnosis Date     ADHD      Alcoholic cirrhosis of liver with ascites (H) 06/17/2015    cirrhosis secondary to alcohol, complicated by variceal bleeding and hepatic encephalopathy      Ascites due to alcoholic cirrhosis (H)     Requiring regular paracentesis.     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 status post subtotal resection by Dr. J Carlos Aranda in early 06/2013. Negative for 1p/19q deletions. S/P Concurrent chemoradiation July 15 through August 23, 2013. Initiated adjuvant oral temozolomide 9/17/13; switch     H/O LENA (obstructive sleep apnea)-moderate 12/18/2012    Pt states this is not currently an issue.     Hyperlipidemia LDL goal <100 8/1/2016     Hypothyroidism 8/1/2016     Liver failure (H)      Moderate major depression (H) 10/3/2012     Obesity      Pancytopenia (H) 8/1/2016    Chronic pancytopenia secondary to liver disease since at least 2012      Partial epilepsy with impairment of consciousness (H) 05/07/2014     Portal hypertensive gastropathy (H)      Portal vein thrombosis 12/18/2015    history of left  lower extremity deep vein thrombosis as well as portal vein and superior mesenteric vein thrombosis, late 2015 when he was hospitalized in the ICU and seriously ill temporary IVC filter placed in 12/2015 when he was in the ICU with deep vein thromboses and active bleeding      Pulmonary nodules 6/17/2015     Rectal bleeding      Seizures (H)      Type 2 diabetes mellitus (H) 10/3/2012       Past Surgical History   Past Surgical History:   Procedure Laterality Date     COLONOSCOPY N/A 11/27/2015    Procedure: COMBINED COLONOSCOPY, SINGLE OR MULTIPLE BIOPSY/POLYPECTOMY BY BIOPSY;  Surgeon: Ran Thurston MD;  Location: [facility]     ENDOSCOPIC ULTRASOUND LOWER GASTROINTESTIONAL TRACT (GI) N/A 10/15/2018    Procedure: Rectal Endoscopic Ultrasound **Latex Allergy** with coiling and glueing of rectal varices;  Surgeon: Guru Jose Kelly MD;  Location: [facility] OR     ENDOSCOPIC ULTRASOUND UPPER GASTROINTESTINAL TRACT (GI) N/A 10/1/2018    Procedure: ENDOSCOPIC ULTRASOUND, ESOPHAGOSCOPY / UPPER GASTROINTESTINAL TRACT (GI);;  Surgeon: Guru Jose Kelly MD;  Location: [facility]     ESOPHAGOSCOPY, GASTROSCOPY, DUODENOSCOPY (EGD), COMBINED N/A 11/23/2015    Procedure: COMBINED ESOPHAGOSCOPY, GASTROSCOPY, DUODENOSCOPY (EGD);  Surgeon: Rocael Rizvi MD;  Location: [facility]     ESOPHAGOSCOPY, GASTROSCOPY, DUODENOSCOPY (EGD), COMBINED N/A 1/25/2017    Procedure: COMBINED ESOPHAGOSCOPY, GASTROSCOPY, DUODENOSCOPY (EGD), BIOPSY SINGLE OR MULTIPLE;  Surgeon: Rocael Tejada MD;  Location: [facility]     ESOPHAGOSCOPY, GASTROSCOPY, DUODENOSCOPY (EGD), COMBINED N/A 3/30/2017    Procedure: COMBINED ESOPHAGOSCOPY, GASTROSCOPY, DUODENOSCOPY (EGD);  Surgeon: Jordana Ramirez MD;  Location: [facility]     ESOPHAGOSCOPY, GASTROSCOPY, DUODENOSCOPY (EGD), COMBINED N/A 1/19/2018    Procedure: COMBINED ESOPHAGOSCOPY, GASTROSCOPY, DUODENOSCOPY (EGD);  Upper Endoscopy with verceal banding; Flexible  Sigmoidoscopy;  Surgeon: Guru Jose Kelly MD;  Location: [facility]     ESOPHAGOSCOPY, GASTROSCOPY, DUODENOSCOPY (EGD), COMBINED N/A 2/12/2018    Procedure: COMBINED ESOPHAGOSCOPY, GASTROSCOPY, DUODENOSCOPY (EGD);  Esophagogastroduodenoscopy with variceal banding;  Surgeon: Guru Jose Kelly MD;  Location: [facility]     ESOPHAGOSCOPY, GASTROSCOPY, DUODENOSCOPY (EGD), COMBINED N/A 3/5/2018    Procedure: COMBINED ESOPHAGOSCOPY, GASTROSCOPY, DUODENOSCOPY (EGD);  Esophagogastroduodenoscopy  with banding of varices Latex Allergy ;  Surgeon: Guru Jose Kelly MD;  Location: [facility]     ESOPHAGOSCOPY, GASTROSCOPY, DUODENOSCOPY (EGD), COMBINED N/A 4/16/2018    Procedure: COMBINED ESOPHAGOSCOPY, GASTROSCOPY, DUODENOSCOPY (EGD);  Upper Endoscopy  with esophageal varices banding; Latex Allergy ;  Surgeon: Guru Jose Kelly MD;  Location: [facility]     ESOPHAGOSCOPY, GASTROSCOPY, DUODENOSCOPY (EGD), COMBINED N/A 5/30/2018    Procedure: COMBINED ESOPHAGOSCOPY, GASTROSCOPY, DUODENOSCOPY (EGD);  upper endoscopy with banding of esophageal varices. *latex Allergy*;  Surgeon: Guru Jose Kelly MD;  Location:  OR     OPTICAL TRACKING SYSTEM CRANIOTOMY, EXCISE TUMOR, COMBINED  6/6/2013    Procedure: COMBINED OPTICAL TRACKING SYSTEM CRANIOTOMY, EXCISE TUMOR;  Left Stealth Guided Craniotomy , Tumor Resection ;  Surgeon: J Carlos Aranda MD;  Location:  OR     ORTHOPEDIC SURGERY      hand surgery- right     SIGMOIDOSCOPY FLEXIBLE N/A 11/24/2015    Procedure: SIGMOIDOSCOPY FLEXIBLE;  Surgeon: Rocael Rizvi MD;  Location:  GI     SIGMOIDOSCOPY FLEXIBLE N/A 1/19/2018    Procedure: SIGMOIDOSCOPY FLEXIBLE;;  Surgeon: Guru Jose Kelly MD;  Location: [facility]     SIGMOIDOSCOPY FLEXIBLE N/A 10/1/2018    Procedure: SIGMOIDOSCOPY FLEXIBLE;;  Surgeon: Guru Jose Kelly MD;  Location:  OR         Social History   Social  History     Social History     Marital status:      Spouse name: Yisel      Number of children: 1      Years of education: N/A     Occupational History            Social History Main Topics     Smoking status: Never Smoker     Smokeless tobacco: Never Used     Alcohol use No      Comment: Quit 01/2014     Drug use: No     Sexual activity: Not on file     Other Topics Concern     Parent/Sibling W/ Cabg, Mi Or Angioplasty Before 65f 55m? No     Social History Narrative    On disability          Family History   Family History   Problem Relation Age of Onset     Adopted: Yes     Medical History Unknown Mother      Cirrhosis Father      Medical History Unknown Brother      Medical History Unknown Brother      Medical History Unknown Brother          Prior to Admission Medications   Prior to Admission Medications   Prescriptions Last Dose Informant Patient Reported? Taking?   CANE, ANY MATERIAL   No No   Sig: One cane   Calcium Carb-Cholecalciferol (CALCIUM 500 +D) 500-400 MG-UNIT TABS 10/18/2018 at AM  No Yes   Sig: Take 500 mg by mouth daily   HYDROcodone-acetaminophen (NORCO) 5-325 MG per tablet More than a month  No No   Sig: Take 2 tablets by mouth every 6 hours as needed for moderate to severe pain   ONE TOUCH LANCETS MISC   No No   Sig: by In Vitro route 4 times daily as needed   Vitamin D, Cholecalciferol, 1000 units TABS 10/18/2018 at AM  Yes Yes   Sig: Take 1 tablet by mouth daily   XIFAXAN 550 MG TABS tablet 10/18/2018 at AM  No Yes   Sig: TAKE ONE TABLET BY MOUTH TWICE DAILY   blood glucose monitoring (ONETOUCH ULTRA) test strip   No No   Sig: Use to test blood sugars 4 times daily as needed or as directed.   carBAMazepine (TEGRETOL) 200 MG tablet 10/18/2018 at AM  No Yes   Sig: Take 1 tablet (200 mg) by mouth 2 times daily   ciclopirox (LOPROX) 0.77 % cream 10/17/2018 at PM  No Yes   Sig: Apply topically 2 times daily To feet and toenails.   cyanocobalamin 1000 MCG TABS 10/18/2018 at AM  Yes  Yes   Sig: Take 1 tablet by mouth daily   ferrous sulfate (IRON) 325 (65 FE) MG tablet 10/18/2018 at AM  No Yes   Sig: Take 1 tablet (325 mg) by mouth 2 times daily   furosemide (LASIX) 20 MG tablet 10/18/2018 at AM  No Yes   Sig: Take 1 tablet (20 mg) by mouth daily   gabapentin (NEURONTIN) 100 MG capsule 10/17/2018 at AM  No Yes   Sig: Take 1 capsule (100 mg) by mouth At Bedtime   hydrOXYzine (ATARAX) 25 MG tablet 10/17/2018 at PM  No Yes   Sig: Take 1 tablet (25 mg) by mouth 2 times daily   Patient taking differently: Take 25 mg by mouth every evening    lactulose (CHRONULAC) 10 GM/15ML solution 10/18/2018 at AM  No Yes   Sig: Take 30 mLs (20 g) by mouth 3 times daily   Patient taking differently: Take 50 mLs by mouth every morning    levofloxacin (LEVAQUIN) 500 MG tablet 10/18/2018 at Unknown time  No Yes   Sig: Take 1 tablet (500 mg) by mouth daily   Patient taking differently: Take 500 mg by mouth daily    levothyroxine (SYNTHROID/LEVOTHROID) 88 MCG tablet 10/18/2018 at AM  No Yes   Sig: Take 1 tablet (88 mcg) by mouth daily   lidocaine (LMX4) 4 % CREA cream   No No   Sig: Apply topically once as needed for mild pain   methylphenidate (RITALIN) 10 MG tablet 10/18/2018 at AM  No Yes   Sig: Take 1 tablet (10 mg) by mouth 2 times daily   mirtazapine (REMERON) 45 MG tablet 10/17/2018 at PM  No Yes   Sig: Take 1 tablet (45 mg) by mouth At Bedtime   multivitamin, therapeutic with minerals (THERA-VIT-M) TABS 10/18/2018 at AM Self Yes Yes   Sig: Take 1 tablet by mouth 2 times daily   omeprazole (PRILOSEC) 20 MG CR capsule 10/18/2018 at AM  No Yes   Sig: Take 2 capsules (40 mg) by mouth 2 times daily   pravastatin (PRAVACHOL) 80 MG tablet 10/18/2018 at AM  No Yes   Sig: Take 1 tablet (80 mg) by mouth daily   spironolactone (ALDACTONE) 25 MG tablet 10/18/2018 at AM  No Yes   Sig: Take 2 tablets (50 mg) by mouth daily   thiamine (VITAMIN B-1) 100 MG tablet 10/18/2018 at AM  No Yes   Sig: Take 1 tablet (100 mg) by mouth  daily   traZODone (DESYREL) 50 MG tablet 10/17/2018 at PM  No Yes   Sig: Take 1 tablet (50 mg) by mouth nightly as needed for sleep   Patient taking differently: Take 50 mg by mouth At Bedtime       Facility-Administered Medications: None     Allergies   Allergies   Allergen Reactions     Latex Itching and Rash     No Clinical Screening - See Comments      Coban and Surgilast cause itching     Tegaderm Transparent Dressing (Informational Only)        Physical Exam   Vital Signs: Temp: 97  F (36.1  C) Temp src: Oral BP: 114/74 Pulse: 76   Resp: 18 SpO2: 94 % O2 Device: None (Room air)    Weight: 172 lbs 0 oz    General Appearance: awake, alert, in no acute distress  HEENT: NCAT, scleral icterus, temporal wasting  Respiratory: Lungs CTAB, no wheezes  Cardiovascular: RRR no murmur  GI: Abdomen non-tender to light and deep palpation, NABS, fluid wave, distended  Lymph/Hematologic: no bleeding, no bruising  Skin: jaundiced, spider angiomas  Musculoskeletal: moves all extremities, 2+ pitting edema bilaterally  Neurologic: AO x1 (person), CN II-XII intact, right eye visual field loss, proximal muscles of upper extremities 3/5 in strength, distal muscles 5/5 and symmetric bilaterally. Lower extremity proximal muscles on left 3/5, on right 2/5, and distal muscle groups 5/5 and symmetric. Sensation intact      Assessment & Plan   Mono Varghese is a 48 year old male with PMH significant for ESLD c/b refractory ascites requiring every 2 week genia (reportedly removing 8-12 L each time and receiving albumin), hepatic encephalopathy, rectal and esophageal varices and PVT not on anticoagulation who presented to the ED today with worsening encephalopathy since rectal endoscopy with coiling of rectal varices on Monday.    # Encephalopathy  # Cirrhosis  Has structural abnormalities in his brain due to resection of a tumor, so any small change can precipitate encephalopathy a lot easier. DDx for Mr. Varghese includes metabolic,  including hepatic encephalopathy, seizure, infection, and medication. Ammonia elevated above baseline and asterixis on exam. Recent change in seizure medication, but has not had seizures in many years, and is following commands, so seizures are less likely the cause, but medications possible. Most likely this is hepatic encephalopathy. The question is, what tipped him over the edge? Hgb stable, UA not suggestive of infection, no infectious symptoms. Current theory is that with bowel prep for colonoscopy, perhaps he stopped taking lactulose? Will need to verify this. No abdominal tenderness, fevers, leukocytosis to suggest SBP  - Scheduled lactulose TID  - PRN lactulose available for increased encephalopathy  - PTA Rifaximin  - PTA levofloxacin  - PT/OT  - blood cultures  - Abdominal ultrasound and Paracentesis  - CXR  - daily CBC  - if no improvement in mental status after ammonia cleared, consider EEG for seizures, MRI for stroke evaluation (given right sided weakness, although not focal)  - continue PTA levofloxacin which was started after procedure on Monday (to be completed 10/22)    # Generalized weakness  # Right-sided weakness  Seems this is a chronic problem. CT angio head/neck with and without contrast on negative and MRI brain w/ and w/o contrast negative during prior hospitalization. Weakness is proximal muscles>distal muscles making myopathy a concern as well.  - hold statin  - re-evaluate as encephalopathy clears    # History of seizures  - PTA carbamazepine    # Malnutrition  - PTA folic acid  - PTA methylphenidate  - PTA thiamine  - high protein/KCal diet  - 2g Na diet  - Nutrition consult      Diet: Combination Diet Regular Diet Adult; High Kcal/High Protein Diet, ADULT; 2 gm NA Diet  Fluids: PRN  Lines: PIV  Munoz Catheter: not present    DVT Prophylaxis: Pneumatic Compression Devices  Code Status: Prior- Patient encephalopathic, deferred  Expected discharge: 2 - 3 days, recommended to prior living  arrangement once mental status at baseline.     The patient was discussed with Dr. Alphonso Gallo MD  [facility]   Pager: [phone]  Please see sticky note for cross cover information      Data     Recent Labs  Lab 10/18/18  1430 10/15/18  1046   WBC 1.9* 2.7*   HGB 11.2* 11.8*   MCV 96 97   PLT 37* 38*   INR 1.58* 1.50*    134   POTASSIUM 3.5 3.4   CHLORIDE 101 99   CO2 26 28   BUN 23 17   CR 1.05 0.93   ANIONGAP 7 8   UCHE 7.8* 8.3*   * 196*   ALBUMIN 2.7*  --    PROTTOTAL 6.0*  --    BILITOTAL 1.3  --    ALKPHOS 339*  --    ALT 28  --    AST 33  --        Recent Results (from the past 24 hour(s))   XR Chest Port 1 View    Narrative    Exam: XR CHEST PORT 1 VW 10/18/2018 8:12 PM    History: For aspiration pneumonia    Comparison: 9/14/2018    Findings: Single 35 degrees upright view of the chest. Right IJ  Port-A-Cath tip projects over the high right atrium. Cardiomediastinal  silhouette is stable. Pulmonary vasculature is within normal limits.  Lung lines are also normal. Minimal streaky bibasilar opacities, right  more so than left. These are stable. There are no new pulmonary  opacities. No pleural effusion or pneumothorax.      Impression    Impression: No new pulmonary opacities to suggest aspiration.    KAYODE ANDRADE MD

## 2018-10-19 NOTE — PLAN OF CARE
Problem: Patient Care Overview  Goal: Plan of Care/Patient Progress Review  Afebrile,vitas stable,alert,oriented to self and place,intermittently disorientation to time and situation. Denied pain or any discomfort.Abdomen distended and taut.Appetite poor,denied nausea,drank boost shake supplement x 2..Small loose stool x 1,Started on tid  lactulose..Abdominal ultrasound done,result pending.Up with assist of 1,gait belt and walker.Continue per plan of care.

## 2018-10-20 NOTE — PROGRESS NOTES
Brief Progress Note    Called to bedside to assess Mono Marin.  Patient with a sudden onset coughing fit.  Family is at bedside and reported that he suddenly started coughing.  No witnessed aspiration, was not eating, was not drinking anything.  This continued over the next hour and patient became increasingly uncomfortable.  Initial set of vitals on arrival were significant for a temp of 100.7, heart rate of 118, BP of 160/106, tachypnea at the mid 20s and notably adequate saturation of 95% and above.  Patient appeared uncomfortable but with good cough and protecting his airway.  A nebulizer was tried without relief.  Lactate was notably grossly elevated at 4.9.  Troponin was negative.  BMP was unremarkable.  CBC showed increases across all 3 lineages, but was largely unremarkable.  Blood cultures were obtained and are pending. Chest x-ray was shot and showed no focal opacities.  EKG was without concerning ischemic change.    Given poor dentition, concern was for aspiration of tooth in the absence of other materials.  Additionally considering pulmonary embolism but given adequate saturations, CT PE study was deferred.  Point-of-care bedside limited cardiac ultrasound was attempted but with poor visualization.  Lateral x-ray of the neck was discussed, but nursing was uncomfortable with patient's degree of respiratory distress and he was transferred to a overflow ICU bed.  Again, patient's gas revealed adequate oxygenation and ventilation.  In the ICU, anterior and lateral x-rays of the neck were obtained without evidence of foreign body.  ENT evaluated at bedside and showed a patent aorta with out evidence of foreign body.  Given patient stability, they deferred further evaluation with OR rigid bronchoscopic.  Temperature continued to increase, but patient with notable improvement in his cough.  Tachycardia improved to mid 80s with last pressure 127/78.    Patrick Aragon MD  Internal Medicine - Dermatology PGY  1  [phone]

## 2018-10-20 NOTE — PLAN OF CARE
Problem: Patient Care Overview  Goal: Plan of Care/Patient Progress Review  OT: Defer, Pt ambulating to bathroom and completing toileting with SBA.  Pt and family member stating that he is nearing his baseline and has some assistance available at home to complete all ADL.  Pt spends most of day in wheelchair and can wheel self into bathroom and complete transfer independently.  Per discussion with patient and family, no acute OT needs at this time, Pt is getting stronger and nearing his baseline.  Will complete orders at this time.

## 2018-10-20 NOTE — PROGRESS NOTES
[facility] [place]    Internal Medicine Progress Note - [facility] Service    Main Plans for Today    - Reconsulted IM Procedure team for Paracentesis   - Ascites cell counts, cultures, cytology  - Titrating lactulose to 3-4 stools  - Reassess neuro exam following aggressive lactulose regimen    Assessment & Plan   Mono Varghese is a 48 year old male with PMH significant for left parietal astrocytoma s/p resection, ESLD c/b refractory ascites requiring every 2 week genia (reportedly removing 8-12 L each time and receiving albumin), hepatic encephalopathy, rectal and esophageal varices and PVT not on anticoagulation who presented to the ED today with worsening encephalopathy since rectal endoscopy with coiling of rectal varices on Monday.     # Encephalopathy  # EtOH cirrhosis c/b hepatic encephalopathy  Similar presentation to previous hospitalization 9/14-9/18. At that time cleared with aggressive lactulose administration. Multiple imaging studies this year with MRI / CT head/neck during last stay. No intracranial pathology discovered. Chief concern at present is for worsened hepatic encephalopathy with ammonia above baseline but unclear reason for change. US abdomen with dopplers showed decreased PVT and non-sepicific gallbladder wall thickening / hyperemia. Additionally with elevated alk phos (though noted at baseline) and direct hyperbilirubinemia. Abdominal exam benign and no infectious signs but would pursue paracentesis to r/o infection. Redemonstrated pancytopenia but worsened leukopenia. Hgb stable; no evidence of bleed. Additionally considering Wernicke's given alcohol history. Will titrate lactulose to assure adequate stooling, obtain diagnostic and therapeutic paracentesis and re-evaluate for more advanced CNS imaging if patient fails to improve symptomatically.  MELD-Na score: 13 at 10/20/2018  7:23 AM  MELD score: 12 at 10/20/2018  7:23 AM  Calculated from:  Serum  "Creatinine: 1.00 mg/dL at 10/20/2018  7:23 AM  Serum Sodium: 136 mmol/L at 10/20/2018  7:23 AM  Total Bilirubin: 1.7 mg/dL at 10/20/2018  7:23 AM  INR(ratio): 1.43 at 10/20/2018  7:23 AM  Age: 48 years  - PT/OT consults  - IM Procedure team consult placed for Paracentesis   - Ascites cell counts, cultures, cytology  - Continue PTA Lactulose and Rifaximin  - PRN lactulose, titrate to 3-4 soft formed stools  - High dose thiamine supplementation  - Continue PTA levofloxacin  - Holding PTA lasix  - Follow-up blood cultures  - If patient fails to clear, will evaluate for more advanced imaging / neurology workup     # Generalized weakness  # Right-sided weakness  # Left parietal astrocytoma s/p partial resection with chemo radiation  Noted with prior hospitalization. Proximal muscle weakness noted on admission H&P not demonstrated on exam this morning (improvement with lactulose?); CK 40. Imaging with last hospitalization (CT H/Neck 9/14 & MRI 9/16) without abnormality. Suspect presenting symptoms are 2/2 known structural abnormality (left parietal astrocytoma resection 2012) with susceptibility to metabolic derangement (ie locus minoris resistentiae).   - holding statin  - re-evaluate as encephalopathy clears     # History of seizures  - PTA carbamazepine     # Malnutrition  - PTA folic acid  - PTA methylphenidate  - PTA thiamine  - high protein/KCal diet  - 2g Na diet  - Nutrition consult    #Deconditioning  \"The patient has a mobility limitation that significantly impairs his/her ability to participate in one or more mobility-related activities of daily living (MRADLs). The patient's mobility limitation cannot be sufficiently resolved by the use of an appropriately fitted cane or walker The patient's home provides adequate access between rooms, maneuvering space and surfaces for the use of the manual wheelchair provided. Use of a manual wheelchair will significantly improve the patient's ability to participate in " "MRADLs and the patient will use it on a regular basis in the home The patient has not expressed an unwillingness to use the manual wheelchair that is provided in the home. The patient has sufficient upper extremity function and other physical and mental capabilities needed to safely self-propel the manual wheelchair that is provided in the home during a typical day, OR the patient has a caregiver who is available, willing and able to provide assistance with the wheelchair when the patient has limitations. 5' 10\"  10/18/18 : 78 kg (172 lb)  Length of need: 99 months  Treatment Diagnosis: Hepatic encephalopathy (h) Oligoastrocytoma of parietal lobe (h)  (primary encounter diagnosis)  Frequent falls, weakness, imbalance\"  - PT/OT Consult placed, appreciate recs    Diet: 2 g Na diet  Fluids: None  DVT Prophylaxis: Pneumatic Compression Devices; chemical contraindicated  Code Status: will address with primary care giver    Disposition Plan   Expected discharge: 2 - 3 days, recommended to prior living arrangement once adequate pain management/ tolerating PO medications and mental status at baseline.     Entered: Patrick Aragon 10/20/2018, 2:26 PM   Information in the above section will display in the discharge planner report.      The patient's care was discussed with the attending physician, Dr. Lynn.    Patrick Aragon MD  Internal Medicine - Dermatology PGY 1  [phone]  Please see sticky note for cross cover information    Interval History   Overnight H&P, nursing notes and vitals reviewed. Per discussion with family, improved from admission with lactulose. Nearing baseline but still with staggered speech. Discussed paracentesis and patient now amenable given need early next week regardless.    4 point ROS conducted, with pertinent positives/negatives discussed above.    Physical Exam   Vital Signs: Temp: 96.9  F (36.1  C) Temp src: Oral BP: 94/61 Pulse: 68   Resp: 16 SpO2: 95 % O2 Device: None (Room air)  "   Weight: 172 lbs 0 oz  General Appearance: AAOx3. Continued word finding difficulties, but staggered speech improved.  Respiratory: Clear to auscultation bilaterally.  Cardiovascular: RRR no m/r/g.  GI: Distended but soft and non-tender. Previous paracentesis bruising present.  Ext: WWP with minimal 1+ edema at ankle  Neuro: CN II-XII in tact. Improved right sided jerking against resistance.    Labs/Imaging/Vitals/Meds: Reviewed in Epic

## 2018-10-20 NOTE — PROVIDER NOTIFICATION
Notified miri De MD that patient's right pupil was noted to be slightly larger than the left, both briskly reactive to light. No interventions at this time.

## 2018-10-20 NOTE — PROVIDER NOTIFICATION
"   10/20/18 1634   Call Information   Date of Call 10/20/18   Time of Call 1511   Name of person requesting the team Shey Vasquez   Title of person requesting team RN   RRT Arrival time 1515   Time RRT ended 1629   Reason for call   Type of RRT Adult   Primary reason for call Respiratory;Sudden or unanticipated change in patient condition;Sepsis suspected;Staff concerned   Respiratory Rate greater than 24;Labored breathing;Other (describe)  (constant coughing)   Sepsis Suspected Elevated Lactate level;Temperature <95 or >101;Heart Rate > 100;RR > 20, SaO2 <90% OR increasing O2 need   Was patient transferred from the ED, ICU, or PACU within last 24 hours prior to RRT call? Yes   [facility]   Situation Patient presents with continuous coughing, wretching in acute distress despite 02 sats in upper 90's.  Tachypnic, hypertensive, febrile.   Background Hx astocytoma, DM, ETOH cirrhosis, ascites, varices, GERD, thrombocytopenia, AMS. Admitted with hepatic encephalopathy   Notable History/Conditions End-Stage disease;Organ failure;Seizures   Assessment Resp >40, continuous coughing with wretching, No secretions.  CXR negative per MD.  02 sats upper 90's on 3L oxymask.  Febrile 101.7 with Lactic Acid 4.9.  Tachycardia 118 with hypertension 178/102. No change in continuous coughing after neb TX and administration of Benadryl IV.    Interventions CXR;ECG;Fluid bolus;Portable monitor;Suction;Meds;Labs;Neb treatment;O2 per N/C or mask   Adjustments to Recommend Pt. wife reports \"Mono has had so much radiation that his teeth are disintegrating.  I think he is coughing from inhaling tooth shards\".   Patient Outcome   Patient Outcome Transferred to  (ICU 4C (higher level of care). 6B not available.)   RRT Team   Attending/Primary/Covering Physician Patrick Aragon   Date Attending Physician notified 10/20/18   Time Attending Physician notified 1511   Lead RN Lucina Bach   RN Anna Johnson   RT Manuelito Najera     "

## 2018-10-20 NOTE — CONSULTS
Otolaryngology Consult Note  October 20, 2018      CC: cough    I was asked by Dr. Aragon of the internal medicine service to see this patient for cough and concern for airway foreign body.     HPI: Mono Varghese is a 48 year old male with complex past medical history including alcoholic cirrhosis and hepatic encephalopathy, oligoastrocytoma status post resection and chemoradiotherapy, who was seen due to concerns for aspiration of a tooth.  Around 1:30 PM this afternoon, he was noted to have an acute onset of cough and tachypnea.  His wife was present at the bedside who did not witness any discrete aspiration event.  Given his overall poor dentition and history of dental fractures, his wife is suspicious that he may have aspirated a tooth.  The patient is unable to give much history related to his mental status.  He was transferred from the medicine floor to the MICU for closer airway monitoring.  A chest x-ray had been obtained which was negative for acute airspace abnormality.  He is also febrile.    Past Medical History:   Diagnosis Date     ADHD      Alcoholic cirrhosis of liver with ascites (H) 06/17/2015    cirrhosis secondary to alcohol, complicated by variceal bleeding and hepatic encephalopathy      Ascites due to alcoholic cirrhosis (H)     Requiring regular paracentesis.     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 Left parietal  oligoastrocytoma, WHO grade 3; predominantly astrocytic, and less than 10% of the specimen was oligodendroglioma. status post subtotal resection by Dr. J Carlos Aranda in early 06/2013. Negative for 1p/19q deletions. S/P Concurrent chemoradiation July 15 through August 23, 2013. Initiated adjuvant oral temozolomide 9/17/13; switch     H/O LENA (obstructive sleep apnea)-moderate 12/18/2012    Pt states this is not currently an issue.     Hyperlipidemia LDL goal <100 8/1/2016     Hypothyroidism 8/1/2016     Liver failure (H)      Moderate major depression (H) 10/3/2012     Obesity      Pancytopenia (H) 8/1/2016    Chronic pancytopenia secondary to liver disease since at least 2012      Partial epilepsy with impairment of consciousness (H) 05/07/2014     Portal hypertensive gastropathy (H)      Portal vein thrombosis 12/18/2015    history of left lower extremity deep vein thrombosis as well as portal vein and superior mesenteric vein thrombosis, late 2015 when he was hospitalized in the ICU and seriously ill temporary IVC filter placed in 12/2015 when he was in the ICU with deep vein thromboses and active bleeding      Pulmonary nodules 6/17/2015     Rectal bleeding      Seizures (H)      Type 2 diabetes mellitus (H) 10/3/2012       Past Surgical History:   Procedure Laterality Date     COLONOSCOPY N/A 11/27/2015    Procedure: COMBINED COLONOSCOPY, SINGLE OR MULTIPLE BIOPSY/POLYPECTOMY BY BIOPSY;  Surgeon: Ran Thurston MD;  Location: [facility]     ENDOSCOPIC ULTRASOUND LOWER GASTROINTESTIONAL TRACT (GI) N/A 10/15/2018    Procedure: Rectal Endoscopic Ultrasound **Latex Allergy** with coiling and glueing of rectal varices;  Surgeon: Guru Jose Kelly MD;  Location: [facility]     ENDOSCOPIC ULTRASOUND UPPER GASTROINTESTINAL TRACT (GI) N/A 10/1/2018    Procedure: ENDOSCOPIC ULTRASOUND, ESOPHAGOSCOPY / UPPER GASTROINTESTINAL TRACT (GI);;  Surgeon: Guru Jose Kelly MD;  Location: [facility]      ESOPHAGOSCOPY, GASTROSCOPY, DUODENOSCOPY (EGD), COMBINED N/A 11/23/2015    Procedure: COMBINED ESOPHAGOSCOPY, GASTROSCOPY, DUODENOSCOPY (EGD);  Surgeon: Rocael Rizvi MD;  Location: [facility]     ESOPHAGOSCOPY, GASTROSCOPY, DUODENOSCOPY (EGD), COMBINED N/A 1/25/2017    Procedure: COMBINED ESOPHAGOSCOPY, GASTROSCOPY, DUODENOSCOPY (EGD), BIOPSY SINGLE OR MULTIPLE;  Surgeon: Rocael Tejada MD;  Location: [facility]     ESOPHAGOSCOPY, GASTROSCOPY, DUODENOSCOPY (EGD), COMBINED N/A 3/30/2017    Procedure: COMBINED ESOPHAGOSCOPY, GASTROSCOPY, DUODENOSCOPY (EGD);  Surgeon: Jordana Ramirez MD;  Location: [facility]     ESOPHAGOSCOPY, GASTROSCOPY, DUODENOSCOPY (EGD), COMBINED N/A 1/19/2018    Procedure: COMBINED ESOPHAGOSCOPY, GASTROSCOPY, DUODENOSCOPY (EGD);  Upper Endoscopy with verceal banding; Flexible Sigmoidoscopy;  Surgeon: Guru Jose Kelly MD;  Location: [facility]     ESOPHAGOSCOPY, GASTROSCOPY, DUODENOSCOPY (EGD), COMBINED N/A 2/12/2018    Procedure: COMBINED ESOPHAGOSCOPY, GASTROSCOPY, DUODENOSCOPY (EGD);  Esophagogastroduodenoscopy with variceal banding;  Surgeon: Guru Jose Kelly MD;  Location: [facility]     ESOPHAGOSCOPY, GASTROSCOPY, DUODENOSCOPY (EGD), COMBINED N/A 3/5/2018    Procedure: COMBINED ESOPHAGOSCOPY, GASTROSCOPY, DUODENOSCOPY (EGD);  Esophagogastroduodenoscopy  with banding of varices Latex Allergy ;  Surgeon: Guru Jose Kelly MD;  Location: [facility]     ESOPHAGOSCOPY, GASTROSCOPY, DUODENOSCOPY (EGD), COMBINED N/A 4/16/2018    Procedure: COMBINED ESOPHAGOSCOPY, GASTROSCOPY, DUODENOSCOPY (EGD);  Upper Endoscopy  with esophageal varices banding; Latex Allergy ;  Surgeon: Guru Jose Kelly MD;  Location: [facility]     ESOPHAGOSCOPY, GASTROSCOPY, DUODENOSCOPY (EGD), COMBINED N/A 5/30/2018    Procedure: COMBINED ESOPHAGOSCOPY, GASTROSCOPY, DUODENOSCOPY (EGD);  upper endoscopy with banding of esophageal varices. *latex Allergy*;   "Surgeon: Guru Jose Kelly MD;  Location: [facility]     OPTICAL TRACKING SYSTEM CRANIOTOMY, EXCISE TUMOR, COMBINED  6/6/2013    Procedure: COMBINED OPTICAL TRACKING SYSTEM CRANIOTOMY, EXCISE TUMOR;  Left Stealth Guided Craniotomy , Tumor Resection ;  Surgeon: J Carlos Aranda MD;  Location: [facility]     ORTHOPEDIC SURGERY      hand surgery- right     SIGMOIDOSCOPY FLEXIBLE N/A 11/24/2015    Procedure: SIGMOIDOSCOPY FLEXIBLE;  Surgeon: Rocael Rizvi MD;  Location: [facility]     SIGMOIDOSCOPY FLEXIBLE N/A 1/19/2018    Procedure: SIGMOIDOSCOPY FLEXIBLE;;  Surgeon: Guru Jose Kelly MD;  [facility]: [facility]     SIGMOIDOSCOPY FLEXIBLE N/A 10/1/2018    Procedure: SIGMOIDOSCOPY FLEXIBLE;;  Surgeon: Guru Jose Kelly MD;  Location: [facility]       Current Outpatient Prescriptions   Medication Sig Dispense Refill     order for DME Equipment being ordered: manual wheelchair and cushion    [employer] light weight Sx5 18x18, 17.5 inch floor height and a 2 inch 18x18 inch cushion    length of need : lifetime        Patient's height : 5/10\", weight : 172 pounds 1 Device 0          Allergies   Allergen Reactions     Latex Itching and Rash     No Clinical Screening - See Comments      Coban and Surgilast cause itching     Tegaderm Transparent Dressing (Informational Only)        Social History     Social History     Marital status:      Spouse name: Yisel      Number of children: 1      Years of education: N/A     Occupational History            Social History Main Topics     Smoking status: Never Smoker     Smokeless tobacco: Never Used     Alcohol use No      Comment: Quit 01/2014     Drug use: No     Sexual activity: Not on file     Other Topics Concern     Parent/Sibling W/ Cabg, Mi Or Angioplasty Before 65f 55m? No     Social History Narrative    On disability        Family History   Problem Relation Age of Onset     Adopted: Yes     Medical History Unknown Mother      " "Cirrhosis Father      Medical History Unknown Brother      Medical History Unknown Brother      Medical History Unknown Brother        ROS: 12 point review of systems is negative unless noted in HPI.    PHYSICAL EXAM:  General: laying in bed, no acute distress  /79  Pulse 117  Temp 102.1  F (38.9  C) (Oral)  Resp 20  Ht 1.778 m (5' 10\")  Wt 72.7 kg (160 lb 3.2 oz)  SpO2 92%  BMI 22.99 kg/m2  HEAD: normocephalic, atraumatic  Face: symmetrical, no swelling, edema, or erythema, no facial droop  Eyes: EOMI without spontaneous or gaze evoked nystagmus, PERRL, clear sclera  Ears: no tragal tenderness, no otorrhea  Nose: no anterior drainage, dry crusting in the nasal vestibule  Mouth: Dentition is in extremely poor repair with multiple fractured teeth.  Mucous membranes are dry.  Tongue is mobile without ulceration or lesion.  Oropharynx: tonsils within normal limits, uvula midline, no oropharyngeal erythema  Neck: no LAD, trach midline  Neuro: cranial nerves 2-12 grossly intact  Respiratory: Lung sounds are clear in all fields.  There is no stridor or stertor.    FIBEROPTIC ENDOSCOPY:  Due to concern for airway foreign body, fiberoptic laryngoscopy was indicated. After obtaining verbal consent, the nose was topically decongested and anesthetized. The fiberoptic laryngoscope was passed under endoscopic vision through the right nasal passage. The turbinates were normal. The inferior and middle meati were clear bilaterally without purulence, masses, or polyps. The nasopharynx was clear. The eustachian tubes were clear. The soft palate appeared normal with good mobility. The epiglottis was sharp, and the visualized portion of the vallecula was clear. The larynx was clear with mobile cords. The arytenoids were clear, and there was no pooling in the hypopharynx.    ROUTINE IP LABS (Last four results)  BMP  Recent Labs  Lab 10/20/18  1544 10/20/18  0723 10/19/18  0707 10/18/18  1430    136 137 134   POTASSIUM " 3.8 3.4 3.5 3.5   CHLORIDE 103 104 103 101   UCHE 8.0* 8.2* 8.2* 7.8*   CO2 20 23 25 26   BUN 25 26 24 23   CR 1.11 1.00 1.04 1.05   * 133* 127* 283*     CBC  Recent Labs  Lab 10/20/18  1544 10/20/18  0723 10/19/18  0734 10/18/18  1430   WBC 4.0 2.1* 1.7* 1.9*   RBC 3.60* 3.35* 3.36* 3.44*   HGB 11.8* 10.9* 10.9* 11.2*   HCT 35.3* 32.6* 32.6* 33.0*   MCV 98 97 97 96   MCH 32.8 32.5 32.4 32.6   MCHC 33.4 33.4 33.4 33.9   RDW 15.1* 15.0 15.1* 15.1*   PLT 50* 38* 37* 37*     INR  Recent Labs  Lab 10/20/18  1544 10/20/18  0723 10/19/18  0707 10/18/18  1430   INR 1.45* 1.43* 1.51* 1.58*       Imaging:  Results for orders placed or performed during the hospital encounter of 10/18/18   XR Chest Port 1 View    Narrative    Exam: XR CHEST PORT 1 VW 10/18/2018 8:12 PM    History: For aspiration pneumonia    Comparison: 9/14/2018    Findings: Single 35 degrees upright view of the chest. Right IJ  Port-A-Cath tip projects over the high right atrium. Cardiomediastinal  silhouette is stable. Pulmonary vasculature is within normal limits.  Lung lines are also normal. Minimal streaky bibasilar opacities, right  more so than left. These are stable. There are no new pulmonary  opacities. No pleural effusion or pneumothorax.      Impression    Impression: No new pulmonary opacities to suggest aspiration.    KAYODE ANDRADE MD   US Abdomen Limited w Abd/Pelv Duplex Complete Port     Value    Radiologist flags (Urgent)     Cannot exclude acute cholecystitis; consider nuclear    Narrative    EXAMINATION: US ABDOMEN LIMITED WITH DOPPLER COMPLETE PORTABLE  10/19/2018 11:22 AM     COMPARISON: Ultrasound 9/6/2018    HISTORY: Encephalopathy concern for decompensation, question worsening  portal venous thrombus or cholecystitis    TECHNIQUE: The abdomen was scanned in standard fashion with  specialized ultrasound transducer(s) using both gray-scale, color  Doppler, and spectral flow techniques.    Findings:  Liver: Cirrhotic morphology of  the liver with coarsened echotexture,  increased echogenicity of the parenchyma, and nodular contour. No  focal intrahepatic mass identified.     Extrahepatic portal vein flow is partially occluded with nonvascular  thrombus, with antegrade flow around the occlusion at 23 cm/s.  Right portal vein flow is antegrade, measuring 18. No thrombus  identified in the right portal vein.  Left portal vein flow is antegrade, measuring 21. Decreased  nonocclusive thrombus in the left portal vein.    Flow in the hepatic artery is towards the liver and:  84 peak systolic  0.69 resistive index.     The splenic vein is not identified. The left, middle, and right  hepatic veins are patent with flow towards the IVC. The IVC is patent  with flow towards the heart.   The visualized aorta is not dilated.    Gallbladder: Increased gallbladder wall thickening since 9/6/2018.  Small echogenic shadowing stones are partially visualized near the  gallbladder neck. The entire bladder is not well-visualized.  Gallbladder wall is hyperemic.    Bile Ducts: No intrahepatic biliary ductal dilatation.  The common  bile duct measures 4 mm.    Fluid: Moderate volume anechoic ascites.      Impression    Impression:   1.  Decreased nonocclusive thrombus in the main and left portal veins,  and resolution of thrombus in the right portal vein. Antegrade flow in  the hepatic vasculature.  2.  Cirrhosis. No discrete hepatic lesion.  3.  Increased gallbladder wall thickening since 9/6/2018 with  cholelithiasis, nonspecific in the setting of chronic liver disease  and ascites. Distally, the gallbladder wall is hyperemic. Given  provided history and clinical concern, cholecystitis is difficult to  exclude. Consider nuclear medicine hepatobiliary (HIDA) scan.  4.  Moderate ascites.    [Access Center: Cannot exclude acute cholecystitis; consider nuclear  medicine hepatic biliary scan]    This report will be copied to the [facility] to ensure  a  provider acknowledges the finding. Access Center is available Monday  through Friday 8am-3:30 pm.     I have personally reviewed the examination and initial interpretation  and I agree with the findings.    KAYODE ANDRADE MD   XR Chest Port 1 View    Narrative    Exam: XR CHEST PORT 1 VW, 10/20/2018 3:44 PM    Indication: sudden onset coughing; c/f aspiration;     Comparison: Chest x-ray 10/18/2018    Findings:   Upright frontal chest x-ray. Right chest wall Port-A-Cath with tip  projecting over the right atrium. Unchanged cardiac silhouette. No  acute airspace opacity. Low lung volume. No pneumothorax or  significant pleural effusion.      Impression    Impression: No new pulmonary opacity to suggest aspiration.     I have personally reviewed the examination and initial interpretation  and I agree with the findings.    BRIAN WORLEY MD   XR Abdomen Port 1 View    Narrative    Exam: XR ABDOMEN PORT 1 VW, 10/20/2018 4:50 PM    Indication: assess intrabdominal air;     Comparison: None    Findings:   Frontal view of the abdomen. The right and upper abdomen are  collimated off the field-of-view. Nonobstructive bowel gas pattern. No  definite free air on this supine study. No portal venous gas. No  pneumatosis.      Impression    Impression: The upper abdomen and far right side of the abdomen are  collimated off the field-of-view. No definite free air on this supine  view. Consider lateral decubitus film if there is significant concern  for free air.    I have personally reviewed the examination and initial interpretation  and I agree with the findings.    BRIAN WORLEY MD   XR Neck Soft Tissue    Narrative    Exam: XR NECK SOFT TISSUE, 10/20/2018 4:41 PM    Indication: acute cough, concern for upper airway aspiration, please  obtain lateral and anterior;     Comparison: None    Findings:   AP and lateral views of the neck. No significant prevertebral soft  tissue swelling. No evidence of obstruction in the upper  airway. No  acute fracture or subluxation of the visualized cervical spine (to the  level of C4). The epiglottis appears normal. No subepiglottic  swelling. The visualized lung apices are clear.      Impression    Impression:   No abnormalities of the upper airway. No significant prevertebral soft  tissue swelling.    I have personally reviewed the examination and initial interpretation  and I agree with the findings.    BRIAN WORLEY MD     *Note: Due to a large number of results and/or encounters for the requested time period, some results have not been displayed. A complete set of results can be found in Results Review.       Assessment and Plan  Mono Varghese is a 48 year old male with complex past medical history as noted above who we are asked to evaluate for possible aspiration of a tooth.  There is no discrete witnessed aspiration event.  No stridor or stertor or extraneous lung sounds are appreciated on exam.  Radiology is unrevealing for any airway foreign body and flexible laryngoscopy was also negative.  At this point, would not recommend proceeding to the operating room for direct laryngoscopy and rigid bronchoscopy is risks likely outweigh benefits.  Remainder of pulmonary workup per primary team.  Could consider consultation with the pulmonology service for flexible bronchoscopy if symptoms continue or worsen.  We would be happy to assist in any way we are able.  Please do not hesitate to contact us with questions or concerns.     Patient and above plan were discussed with otolaryngology staff, Dr. Annbael Hurst.    Ezequiel Chris MD PGY4  Otolaryngology-Head & Neck Surgery  Please page ENT with questions by dialing [phone] and entering job code [phone] when prompted.

## 2018-10-20 NOTE — PROGRESS NOTES
Transferred from:  around 1630  Reason for admission/transfer: closer monitoring of respiratory status and lactic acidosis   Patient status upon admission/transfer: hemodynamically stable on room air; persistent unproductive cough; oriented to self only  Interventions: evaluated by miri De and ENT; given 1L LR bolus; labs/cultures drawn   Plan: obtain CXR at midnight, follow up on pending lactic acid recheck; will monitor closely overnight for decompensation, remains a miri 1 patient  2 RN skin assessment: completed by Raudel MIN and Charley LEWIS   Result of skin assessment and interventions/actions: no interventions; noted bruising on right arm and right abdomen from falls per wife; blanchable redness on coccyx; also has some paracentesis site on abdomen; diffuse scabbing  Height, weight, drug calc weight: done  Patient belongings: kept with wife  MDRO education (if applicable): N/A

## 2018-10-20 NOTE — PLAN OF CARE
"Problem: Patient Care Overview  Goal: Plan of Care/Patient Progress Review  Wife called from room about 2:30 pm and reported that pt has been constantly coughing for more than an hour and wife also  said also pt was complaining of chills.Writer went to pt's room to assess pt, pt was in bathroom, pt was having non productive constant cough, pt was moaning and when asked what he feels, pt said \" I do not know what I feel\". Temp was 100.7 orally, unable to do the rest of vital sign at that moment as pt was in the bathroom, but Vs was done when pt was back to bed, B/P was high, not sure if it is right as pt was coughing and have constant back and forth movement. Medicine cross cover was notified regarding pt's sudden constant cough and chill, Dr. Aragon came and assessed pt at bed side. Pt triggered lactic acid, which came back 4.9.Rapid response got called as pt kept coughing, became very restless, and pt kept moaning.Pt had albuterol neb x1 with no relief of cough.Chest x-ray done.Pt had benadryl 50 mg IV x1 per float float RN, as pt was sneezing( incase if all the sudden cough and sneezing is an allergy reaction to something)patient transferred to  via his bed escorted by float float RNs.Wife followed him with his belongings.Chart and medication sent  to  via house orderly.Report given to  RN in person as writer unable to reach  charge nurse or   primary RN via phone.    Regarding pt's situation prior to start coughing:   Pt was oriented only to self and place.Pt with disorganized thought process.Pt was able making his needs known.Up to bathroom frequently with assist of one and a walker.Soft BM x5 so far today, pt is on lactulose. Pt refused breakfast, pt had grapes for luch, pt was sitting on bed and was able to eat his grapes with no trouble to chew grapes or swallow grapes.Pt was able to swallow his medications with no trouble.Bed alarm maintained for safety. Wife at bed side since this morning, " supportive of pt.

## 2018-10-20 NOTE — PLAN OF CARE
"Problem: Patient Care Overview  Goal: Plan of Care/Patient Progress Review  Outcome: No Change  /65 (BP Location: Left arm)  Pulse 72  Temp 98  F (36.7  C) (Oral)  Resp 16  Ht 1.778 m (5' 10\")  Wt 78 kg (172 lb)  SpO2 93%  BMI 24.68 kg/m2    A&O to self. Bed alarm on for safety. AVSS on RA. Denied Pain. Abdomen distended. Tolerating diet. R chest port SL. Pt up w/1 and walker. Voided 200cc x1. No BM this shift. Continue POC.      "

## 2018-10-20 NOTE — PHARMACY-VANCOMYCIN DOSING SERVICE
Pharmacy Vancomycin Initial Note  Date of Service 2018  Patient's  1970  48 year old, male    Indication: Aspiration Pneumonia    Current estimated CrCl = Estimated Creatinine Clearance: 97.5 mL/min (based on Cr of 1).    Creatinine for last 3 days  10/18/2018:  2:30 PM Creatinine 1.05 mg/dL  10/19/2018:  7:07 AM Creatinine 1.04 mg/dL  10/20/2018:  7:23 AM Creatinine 1.00 mg/dL    Recent Vancomycin Level(s) for last 3 days  No results found for requested labs within last 72 hours.      Vancomycin IV Administrations (past 72 hours)      No vancomycin orders with administrations in past 72 hours.                Nephrotoxins and other renal medications (Future)    Start     Dose/Rate Route Frequency Ordered Stop    10/20/18 1700  vancomycin (VANCOCIN) 1,500 mg in sodium chloride 0.9 % 250 mL intermittent infusion      1,500 mg  over 90 Minutes Intravenous EVERY 12 HOURS 10/20/18 1601      10/20/18 1600  piperacillin-tazobactam (ZOSYN) 4.5 g vial to attach to  mL bag      4.5 g  over 30 Minutes Intravenous EVERY 6 HOURS 10/20/18 1556            Contrast Orders - past 72 hours     None                Plan:  1.  Start vancomycin  1500 mg (~19.2 mg/kg per dose) IV q12h.   2.  Goal Trough Level: 15-20 mg/L   3.  Pharmacy will check trough levels as appropriate in 1-3 Days.    4.  Serum creatinine levels will be ordered daily for the first week of therapy and at least twice weekly for subsequent weeks.    5.  [place] method utilized to dose vancomycin therapy: Method 2      Andrew Mahan, PharmD, BCPS  2018

## 2018-10-20 NOTE — PLAN OF CARE
"Problem: Confusion, Acute (Adult)  Goal: Identify Related Risk Factors and Signs and Symptoms  Related risk factors and signs and symptoms are identified upon initiation of Human Response Clinical Practice Guideline (CPG).   Outcome: No Change  Neuro: A&Ox2.   Cardiac: VSS.   /88 (BP Location: Left arm)  Pulse 82  Temp 99.1  F (37.3  C) (Oral)  Resp 16  Ht 1.778 m (5' 10\")  Wt 78 kg (172 lb)  SpO2 96%  BMI 24.68 kg/m2  Respiratory: Sating 90s on RA.  GI/: Adequate urine output. BM X6  Diet/appetite: Tolerating High calories High protein diet. Eating small amounts  Activity:  Assist of 1, up to chair and in the bathroom or commode.   Pain: At acceptable level   Skin: No new deficits noted.   LDA's:    Plan: Continue with POC. Notify primary team with changes.  Disorientated to time and situation. Not sure what month is was. Uncertain of why exactly he is here and the importance of calling staff when getting up. Redirectable.   Patient moved rooms this evening due to a hostile roommate. Wife and him were quite upset.           "

## 2018-10-21 NOTE — PROCEDURES
PROCEDURE:   Diagnostic Paracentesis, U/S guided.    INDICATION:   Symptom management    DIAGNOSIS:  Worsening ascites.    PROCEDURE : Dr. Celsa Gregg    CONSENT:   Informed consent was obtained after risks and benefits were explained at length.     PROCEDURE SUMMARY:   A time-out was performed. The area of the left abdomen was prepped and draped in a sterile fashion using chlorhexidine scrub. 1% lidocaine was used to numb the region. Ultrasound was used to locate a safe and appropriate location for paracentesis catheter insertion. Images were saved into Epic. A paracentesis kit needle was inserted and advanced with negative pressure. No blood was aspirated. Clear yellow fluid was retrieved and collected. Approximately 65 mL of ascitic fluid was collected and sent for laboratory analysis. The patient tolerated the procedure well without any immediate complications. Dr. Gregg was present during the procedure.    ESTIMATED BLOOD LOSS: minimal

## 2018-10-21 NOTE — PLAN OF CARE
Problem: Patient Care Overview  Goal: Plan of Care/Patient Progress Review  4C-PT: CXL: Pt receiving paracentesis at time of attempt. Unable to check back dt scheduling demands - Will continue to follow per POC.

## 2018-10-21 NOTE — PROGRESS NOTES
[facility], [place]    Internal Medicine Progress Note - [facility]    Main Plans for Today    - Diagnostic paracentesis  - HIDA scan  - continue iv zosyn (d/c vanc)  - Repeat lactate (q4h today)   - Bolus if uptrending  - f/u UCX, BCX and ascites cx  - Speech eval for dysphagia - clear for reg diet and thin liq; ok for PO meds  - Titrating lactulose to 3-4 stools    Assessment & Plan   Mono Varghese is a 48 year old male with PMH significant for left parietal astrocytoma s/p resection, ESLD c/b refractory ascites requiring every 2 week genia (reportedly removing 8-12 L each time and receiving albumin), hepatic encephalopathy, rectal and esophageal varices and PVT not on anticoagulation who presented to the ED with worsening encephalopathy since rectal endoscopy with coiling of rectal varices on Monday. Now with severe sepsis 2/2 gram negative bacteremia with intermittent coughing spells.     # Encephalopathy  # Decompensated EtOH cirrhosis c/b hepatic encephalopathy  Similar presentation to previous hospitalization 9/14-9/18. At that time cleared with aggressive lactulose administration. Multiple imaging studies this year with MRI / CT head/neck during last stay. No intracranial pathology discovered. Chief concern at present is for worsened hepatic encephalopathy with ammonia above baseline. US abdomen with dopplers showed decreased PVT and non-sepicific gallbladder wall thickening / hyperemia. Additionally with elevated alk phos (though noted at baseline) and direct hyperbilirubinemia. Hgb stable; no evidence of bleed. Additionally considering Wernicke's given alcohol history. Chief concern at present is for decompensation 2/2 gram negative bacteremia and severe sepsis. Will pursue aggressive lactulose administration titrate to assure adequate stooling, with infecitous work-up as above. Patient npo overnight  d/t aspiration risk given coughing fit and worsening encephalopathy  since yesterday. Cleared on speech eval this AM for regular diet and thin liquids.   MELD-Na score: 18 at 10/21/2018  4:09 AM  MELD score: 18 at 10/21/2018  4:09 AM  Calculated from:  Serum Creatinine: 1.14 mg/dL at 10/21/2018  4:09 AM  Serum Sodium: 137 mmol/L at 10/21/2018  4:09 AM  Total Bilirubin: 3.9 mg/dL at 10/21/2018  4:09 AM  INR(ratio): 1.55 at 10/21/2018  4:09 AM  Age: 48 years  - PT/OT consults  - IM Procedure team consult placed for Paracentesis (Diagnostic only given sepsis)   - Ascites cell counts, cultures, cytology  - Continue PTA Lactulose and Rifaximin  - PRN lactulose (20g tid), titrate to 3-4 soft formed stools  - High dose thiamine supplementation  - Levofloxacin (10/19 -->10/20) -transitioned to Zosyn (10/20 - )  - Holding PTA lasix, spironolactone  - If patient fails to clear, will evaluate for more advanced imaging / neurology workup    #Persistent cough (resolving)  #Severe Sepsis 2/2 E coli bacteremia of unclear source  Patient reported having sudden onset of coughing fit yesterday, without any witnessed aspiration event. C/f  aspiration for tooth given poor dentition and PE given VTE hx. Initial set of vitals were significant for  T  100.7, , /106, tachypnea (mid 20s), and notably adequate saturation of 95% and above. On evaluation, patient appeared in respiratory distress but with good cough and protecting his airway. LA elevated at 4.9. Patient transferred to intermediate care for airway monitoring. CBC unremarkable. Chest/ab/neck xray/flexible laryngoscopy unremarkable for any new airway and lung opacities/foreign body. Bclx (left hand) growing e coli. Possible source biliary (cholangitis, cholecystitis) vs GI (colitis, diverticulitis) vs  (UTI, pyelonephritis). UA 10/21 notable for ketones (10), albumin (10) and WBC (6), otherwise unremarkable; presently covered with zosyn.  - IM Procedure team consult placed for Paracentesis (Diagnostic only given sepsis)   - Ascites  "cell counts, cultures, cytology  - HIDA scan ordered  - Zosyn 4.5g q6h (10/20 - )  - f/u BCX, UCX, ascites cx  - Trend Lactate (q4h check)  (latest 1.2); s/p LR bolus 1L   - consider additional bolus if up-trending     # Generalized weakness  # Right-sided weakness  # Left parietal astrocytoma s/p partial resection with chemo radiation  Noted with prior hospitalization. Proximal muscle weakness noted on admission H&P not demonstrated on repeat exam; CK 40. Imaging with last hospitalization (CT H/Neck 9/14 & MRI 9/16) without abnormality. Suspect presenting symptoms are 2/2 known structural abnormality (left parietal astrocytoma resection 2012) with susceptibility to metabolic derangement (ie locus minoris resistentiae).   - holding statin  - re-evaluate as encephalopathy clears    # Malnutrition  - PTA folic acid/B12, D3,  thiamine  - Regular Adult Diet/ thin liquids; boost between meals  - Nutrition following, daily calorie counts (-->10/22)     # Chronic conditions  # History of seizures: cont PTA  Carbamazepine 200 mg bid  # Hypothyroidism: cont PTA  Levothyroxine 88mcg qday  # MDD: cont PTA mirtazapine 45mg qpm, methylphenidate 10mg bid    #Deconditioning  \"The patient has a mobility limitation that significantly impairs his/her ability to participate in one or more mobility-related activities of daily living (MRADLs). The patient's mobility limitation cannot be sufficiently resolved by the use of an appropriately fitted cane or walker The patient's home provides adequate access between rooms, maneuvering space and surfaces for the use of the manual wheelchair provided. Use of a manual wheelchair will significantly improve the patient's ability to participate in MRADLs and the patient will use it on a regular basis in the home The patient has not expressed an unwillingness to use the manual wheelchair that is provided in the home. The patient has sufficient upper extremity function and other physical and mental " "capabilities needed to safely self-propel the manual wheelchair that is provided in the home during a typical day, OR the patient has a caregiver who is available, willing and able to provide assistance with the wheelchair when the patient has limitations. 5' 10\"  10/18/18 : 78 kg (172 lb)  Length of need: 99 months  Treatment Diagnosis: Hepatic encephalopathy (h) Oligoastrocytoma of parietal lobe (h)  (primary encounter diagnosis)  Frequent falls, weakness, imbalance\"  - PT/OT Consult placed, appreciate recs    Diet: Regular Adult Diet/ thin liquids; boost between meals  Fluids: None  DVT Prophylaxis: Pneumatic Compression Devices; chemical contraindicated  Code Status: DNR/DNI    Disposition Plan   Expected discharge: 2 - 3 days, recommended to prior living arrangement once adequate pain management/ tolerating PO medications and mental status at baseline.     Entered: Auranadya Osborne 10/21/2018, 8:29 AM   Information in the above section will display in the discharge planner report.      The patient's care was discussed with the attending physician, Dr. Lynn.      Interval History   Overnight H&P, nursing notes and vitals reviewed. Patient transferred to intermediate care yesterday for airway monitoring d/t persistent coughing fit c/f aspiration vs PE. Triggered sepsis for tachycardia (118), tachypnea (mid-20's), and fever (100.7). LA 4.6. Imaging (CT/xray chest, neck) negative, but bclx with gram negative rods. Started on vanc/zosyn. This morning, patient reports improvement in cough, although nursing notes intermittent coughing overnight. No oxygen requirement and communicating. Vitals stable other than T 100.9 this AM. His encephalopathy has worsened today after some improvement to nearly baseline as reported by family yesterday.    4 point ROS conducted, with pertinent positives/negatives discussed above.    Physical Exam   Vital Signs: Temp: 100.3  F (37.9  C) Temp src: Axillary BP: 106/71 Pulse: 117 Heart Rate: " 78 Resp: 18 SpO2: 97 % O2 Device: None (Room air) Oxygen Delivery: 2 LPM  Weight: 160 lbs 3.2 oz  General Appearance: Lying in bed, able to converse but unclear understanding, disorientation, and confusion noted. NAD. Encephalopathy worsened from yesterday.   Respiratory: Clear to auscultation bilaterally.  Cardiovascular: RRR no m/r/g.  GI: Distended but soft and non-tender. Previous paracentesis bruising present.  Ext: WWP with minimal 1+ edema at ankle  Neuro: CN II-XII intact. B/l asterixis present.    Labs/Imaging/Vitals/Meds: Reviewed in Epic

## 2018-10-21 NOTE — PLAN OF CARE
Problem: Patient Care Overview  Goal: Plan of Care/Patient Progress Review  Discharge Planner SLP   Patient plan for discharge: none stated  Current status: SLP: Clinical swallow eval completed per MD orders. Pt presents with minimal oropharyngeal dysphagia. Pt tolerated thin liquids, pureed textures, and regular solid textures with no overt s/sx of aspiration. Regular solid textures resulted in mildly prolonged but functional time for mastication. No evidence of oral residuals remaining. Recommend pt initiate regular textures and thin liquids with intermittent supervision. Pt should be fully upright and alert for all PO, take small sips/bites, slow pacing, and alternate between cosistencies. Hold PO should pt demonstrate overt s/sx of aspiration. ST to continue to follow to assess diet tolerance. Anticipate pt will meet goals prior to discharge.  Barriers to return to prior living situation: none per ST  Recommendations for discharge: defer to PT/OT  Rationale for recommendations: anticipate pt will meet goals prior to discharge       Entered by: Ne Saab 10/21/2018 11:14 AM

## 2018-10-21 NOTE — PLAN OF CARE
Problem: Confusion, Acute (Adult)  Goal: Cognitive/Functional Impairments Minimized  Patient will demonstrate the desired outcomes by discharge/transition of care.   Outcome: No Change  Pt was intermittently agitated overnight. Coughing spells continued, but patient did not require oxygen and was able to communicate through coughing. Pt does however seem to be coughing forcefully and when RN tells pt to clear throat, coughing spells stop. Chest CT last night negative for significant findings. Sinus with no ectopy. Normotensive pressures. Pt remained NPO d/t intermittent coughing fits and confusion. Pt incontinent of stool and urine x2. Tmax 100.5 overnight. Platelet level this AM 29. MD notified and plan to defer to day team for goal tx parameters (no overt bleeding stable INR). Continue to monitor pt status and notify sx of any changes in health status.

## 2018-10-21 NOTE — PROGRESS NOTES
10/21/18 1104   General Information   Onset Date 10/18/18   Start of Care Date 10/21/18   Referring Physician Kaycee Lynn MD   Patient Profile Review/OT: Additional Occupational Profile Info See Profile for full history and prior level of function   Patient/Family Goals Statement Pt did not state   Swallowing Evaluation Bedside swallow evaluation   Behaviorial Observations Alert   Mode of current nutrition NPO   Respiratory Status O2 Supply   Type of O2 supply Nasal cannula   Comments Mono Varghese is a 48 year old male with complex past medical history including alcoholic cirrhosis and hepatic encephalopathy, oligoastrocytoma status post resection and chemoradiotherapy, who was seen due to concerns for aspiration of a tooth.  Around 1:30 PM this afternoon, he was noted to have an acute onset of cough and tachypnea.  His wife was present at the bedside who did not witness any discrete aspiration event.  Given his overall poor dentition and history of dental fractures, his wife is suspicious that he may have aspirated a tooth.  The patient is unable to give much history related to his mental status.  He was transferred from the medicine floor to the MICU for closer airway monitoring.  A chest x-ray had been obtained which was negative for acute airspace abnormality.  He is also febrile. Per pt and wife, pt with no trouble swallowing PTA. Pts wife reports pt was receiving speech for cognition at home. Clinical swallow eval completed per MD orders to further assess oropharyngeal swallow function.    Clinical Swallow Evaluation   Oral Musculature generally intact   Structural Abnormalities none present   Dentition present and adequate  (in poor condition )   Mucosal Quality dry   Mandibular Strength and Mobility intact   Oral Labial Strength and Mobility WFL   Lingual Strength and Mobility impaired anterior elevation   Buccal Strength and Mobility impaired   Laryngeal Function Cough;Throat clear;Swallow;Voicing  initiated   Oral Musculature Comments generalized weakness   Additional Documentation Yes   Clinical Swallow Eval: Thin Liquid Texture Trial   Mode of Presentation, Thin Liquids spoon;cup;straw;self-fed;fed by clinician   Volume of Liquid or Food Presented 6 oz   Oral Phase of Swallow WFL   Pharyngeal Phase of Swallow intact   Diagnostic Statement Pt tolerated thin liquids via spoon, cup and straw with no overt s/sx of aspiration   Clinical Swallow Eval: Puree Solid Texture Trial   Mode of Presentation, Puree spoon;fed by clinician   Volume of Puree Presented 3 tbsp   Oral Phase, Puree WFL   Pharyngeal Phase, Puree intact   Diagnostic Statement Pt tolerated pureed textures via spoon with no overt s/sx of aspiration   Clinical Swallow Eval: Solid Food Texture Trial   Mode of Presentation, Solid self-fed   Volume of Solid Food Presented 3 tbsp   Oral Phase, Solid other (see comments)  (mildly prolonged but functional time for mastication)   Pharyngeal Phase, Solid intact   Diagnostic Statement No overt s/sx of aspiration. Pt required midlly prolonged but functional time for mastication   VFSS Evaluation   VFSS Additional Documentation No   FEES Evaluation   Additional Documentation No   Swallow Compensations   Swallow Compensations Alternate viscosity of consistencies;Effortful swallow;Pacing;Reduce amounts;Multiple swallow   Results Oral difficulties only   General Therapy Interventions   Planned Therapy Interventions Dysphagia Treatment   Dysphagia treatment Compensatory strategies for swallowing;Instruction of safe swallow strategies   Swallow Eval: Clinical Impressions   Skilled Criteria for Therapy Intervention Skilled criteria met.  Treatment indicated.   Functional Assessment Scale (FAS) 6   Treatment Diagnosis minimal oropharyngeal dysphagia   Diet texture recommendations Regular diet;Thin liquids   Recommended Feeding/Eating Techniques alternate between small bites and sips of food/liquid;check mouth  frequently for oral residue/pocketing;hard swallow w/ each bite or sip;maintain upright posture during/after eating for 30 mins;small sips/bites   Demonstrates Need for Referral to Another Service occupational therapy;physical therapy;respiratory therapy;dietitian   Therapy Frequency 3 times/wk   Predicted Duration of Therapy Intervention (days/wks) 1 week   Anticipated Discharge Disposition extended care facility   Risks and Benefits of Treatment have been explained. Yes   Patient, family and/or staff in agreement with Plan of Care Yes   Clinical Impression Comments SLP: Clinical swallow eval completed per MD orders. Pt presents with minimal oropharyngeal dysphagia. Pt tolerated thin liquids, pureed textures, and regular solid textures with no overt s/sx of aspiration. Regular solid textures resulted in mildly prolonged but functional time for mastication. No evidence of oral residuals remaining. Recommend pt initiate regular textures and thin liquids with intermittent supervision. Pt should be fully upright and alert for all PO, take small sips/bites, slow pacing, and alternate between cosistencies. Hold PO should pt demonstrate overt s/sx of aspiration. ST to continue to follow to assess diet tolerance. Anticipate pt will meet goals prior to discharge.    Total Evaluation Time   Total Evaluation Time (Minutes) 10

## 2018-10-21 NOTE — PLAN OF CARE
Problem: Confusion, Acute (Adult)  Goal: Identify Related Risk Factors and Signs and Symptoms  Related risk factors and signs and symptoms are identified upon initiation of Human Response Clinical Practice Guideline (CPG).   Outcome: No Change  2 RN skin assessment completed with Ne Montaño no overt skin break down or open issues needing addressing noted. Just blanchable redness of coccyx and diffuse bruising.

## 2018-10-21 NOTE — PLAN OF CARE
Problem: Patient Care Overview  Goal: Plan of Care/Patient Progress Review  Outcome: No Change  Remains on room air, denies shortness of breath or trouble breathing. Oriented to self and place; appears to be improving slightly after re-starting Lactulose. Right pupil remains slightly larger than the left. VPM discontinued. T max 100.9, blood cultures and urine sent. Blood culture from 10/20 positive for E. Coli, MD notified. Lactic 2.3. Remains hemodynamically stable. Re-started regular diet after speech evaluation, has poor appetite. Has met stool goal of 3-4/day. Diagnostic paracentesis at bedside without complication. Voiding via urinal and bedpan, occasionally incontinent.     Checking q4hr lactic overnight. May consider transfer to medicine floor tomorrow 10/22. Notify maroon 1 with concerns/changes.

## 2018-10-22 NOTE — PLAN OF CARE
Problem: Patient Care Overview  Goal: Plan of Care/Patient Progress Review  D: 49 y/o male etoh cirrhosis, encephalopathy, septic picture  I/A: Patient oriented to self and place throughout shift with periods of confusion. VSS, tmax 99.2. Room air. Continues to have non productive cough. PM lactulose held, >4 stools yesterday. One large stool over night. AM K replaced with 40meq PO. PM lactic 1.2. Plt 27, md notified, awaiting response. Wife updated via phone.  P: Transfer out of ICU. Follow poc and report to miri De with changes.     Problem: Confusion, Acute (Adult)  Goal: Cognitive/Functional Impairments Minimized  Patient will demonstrate the desired outcomes by discharge/transition of care.   Patients neurological status unchanged. Oriented x2 throughout shift.

## 2018-10-22 NOTE — PROGRESS NOTES
UPDATE:  [facility] contacted this RNCC about the wheelchair, they are needing Dr. He note from 10/19 to state that this pt cannot walk safely with a cane or walker from room to room, due to multiple sudden falls in the home, therefore a wheelchair is recommended in the home- once completed RNCC to fax to [facility].  RNCC paged Dr. Lynn asking her to addend the note from 10/19 .   RNCC to then fax it to [facility] and contact April at [phone] on day of discharge     [facility]  Ph [phone]  Fax [phone]               Belinda Cardozo, SHUKRI, BSN    [facility]    Medicine Group  [address] [place] [id number]    [email or web address]  [email or web address]    Office: [phone] Pager: [phone]  To contact weekend RNCC, dial [phone] and enter pager number [phone] at prompt. This pager can not be contacted by text page or outside line.

## 2018-10-22 NOTE — PROGRESS NOTES
Calorie Count  Intake recorded for: 10/21  Kcals: 0  Protein: 0g  # Meals Recorded: no meals ordered from kitchen, no intake recorded.   # Supplements Recorded: no intake recorded.

## 2018-10-22 NOTE — PLAN OF CARE
Problem: Confusion, Acute (Adult)  Goal: Cognitive/Functional Impairments Minimized  Patient will demonstrate the desired outcomes by discharge/transition of care.   Outcome: Improving  Pt admitted d/t hx ETOH and end stage liver failure. Arrived to ICU 10/20 d/t increased encephalopathy. Pt alert and oriented x2-3. Compliant and responds appropriately. Pupils are unequal and reactive. Continues to be in sinus rhythm 70-80s w/ no ectopy. BP stable. Lungs clear- RA. Lactulose given x1 this AM. Held this afternoon d/t 2 large loose stools. Pt able to transfer w/ A01 and walker. Ambulated around unit w/ PT.  Abd distended and firm. HYDA scan done today- see results. Continues on reg diet-thin liquids. Takes pills whole. Access includes R port and L 20g peripheral. No drips. TKO running. No reports of pain.  Plan for paracentesis and floor transfer when bed available.

## 2018-10-22 NOTE — PLAN OF CARE
Problem: Patient Care Overview  Goal: Plan of Care/Patient Progress Review  Discharge Planner PT   Patient plan for discharge: home  Current status: PT 4C: Pt agreeable to PT with encouragement. Pt slow to mobilize and appeared to be slow to process commands. Reduced safety noted, pt hesitant to modify techniques when cued by PT. Pt CGA-SBA supine>sit, sit<>stand with FWW. Pt ambulated slowly with FWW and CGA-SBA up to 220 ft, displayed reduced upright posture and preference to stand a ways behind walker. HR 80 bpm, O2 sats 96-98% on RA with activity.   Barriers to return to prior living situation: medical status, confusion, 15 stairs  Recommendations for discharge: home with home PT pending stair goal attainment; plan for assist from family (evaluating PT spoke with patient's mother-in-law to confirm plan)  Rationale for recommendations: current below baseline functional deficits       Entered by: Ruby Wills 10/22/2018 10:41 AM

## 2018-10-22 NOTE — PROGRESS NOTES
[facility], [place]    Internal Medicine Progress Note - [facility] Service    Main Plans for Today    - f/u HIDA scan; if neg GI consult to discuss possible procedural source of infection  - switch to ceftriaxone 2g q24h; d/c zosyn   - f/u  BCX, ascites clx  - Titrating lactulose to 3-4 stools    Assessment & Plan   Mono Varghese is a 48 year old male with PMH significant for left parietal astrocytoma s/p resection, ESLD c/b refractory ascites requiring every 2 week genia (reportedly removing 8-12 L each time and receiving albumin), hepatic encephalopathy, rectal and esophageal varices and PVT not on anticoagulation who presented to the ED with worsening encephalopathy since rectal endoscopy with coiling of rectal varices on Monday. Now with severe sepsis 2/2 E. Coli bacteremia of unclear source with intermittent coughing spells.    #Severe Sepsis 2/2 E coli bacteremia of unclear source  Noted to be febrile, tachycardic and tachypneic during coughing fit 10/20, although normal O2 sat. LA elevated at 4.9. Patient transferred to intermediate care for airway monitoring. CBC unremarkable. Chest/ab/neck xray/flexible laryngoscopy unremarkable for any new airway and lung opacities/foreign body. Bclx (left hand) growing e coli (FQ/ bactrim resistant). Possible sources include procedural from recent rectal endoscopy with coiling vs  biliary (cholangitis, cholecystitis) vs GI (colitis, diverticulitis) vs  (UTI, pyelonephritis). UA / diagnostic paracentesis (SAAG 2.2) without evidence of infection (though 6 WBCs on UA); presently covered with ceftriaxone.  - f/u HIDA scan; if neg GI consult to discuss possible procedural source of infection  - d/c Zosyn (10/20 -->10/21 ); transitioned to Ceftriaxone (10/22 -)  - f/u BCX, ascites cx  - will follow clinically    #Intermittent dry coughing fit, improved  Patient started having sudden onset of coughing fit 10/20, without any witnessed aspiration  event. Initially concerned for tooth aspiration but no evidence on multiple imaging studies / flex laryngoscopy. No response to bronchodilator. Possible diaphragmatic irritation?  - Continue to monitor     # Encephalopathy  # Decompensated EtOH cirrhosis c/b hepatic encephalopathy  Similar presentation to previous hospitalization 9/14-9/18. At that time cleared with aggressive lactulose administration. Multiple imaging studies this year with MRI / CT head/neck during last stay with no intracranial pathology.  US abdomen with dopplers showed decreased PVT and non-sepicific gallbladder wall thickening / hyperemia. Additionally with elevated alk phos (though noted at baseline) and direct hyperbilirubinemia. Hgb stable; no evidence of bleed. Additionally considering Wernicke's given alcohol history. Chief concern at present is for decompensation 2/2 E. Coli bacteremia and severe sepsis; stable today. Significantly improved mentation this morning with aggressive lactulose administration yesterday. Lactulose titrated to assure adequate stooling (3-4), with infecitous work-up as above.   MELD-Na score: 15 at 10/22/2018  3:45 AM  MELD score: 14 at 10/22/2018  3:45 AM  Calculated from:  Serum Creatinine: 0.98 mg/dL (Rounded to 1) at 10/22/2018  3:45 AM  Serum Sodium: 136 mmol/L at 10/22/2018  3:45 AM  Total Bilirubin: 2.0 mg/dL at 10/22/2018  3:45 AM  INR(ratio): 1.61 at 10/22/2018  3:45 AM  Age: 48 years  - PT/OT consults  - IM Procedure team consult placed for therapeutic para tomorrow  - Continue PTA Lactulose and Rifaximin  - PRN lactulose (20g tid), titrate to 3-4 soft formed stools  - High dose thiamine supplementation  - Infectious work-up as above  - Levofloxacin (10/19 -->10/20);  Zosyn (10/20 -->10/21 ) transitioned to Ceftriaxone (10/22 -)  - Holding PTA lasix, spironolactone  - If patient fails to clear, will evaluate for more advanced imaging / neurology workup      # Generalized weakness  # Right-sided  "weakness  # Left parietal astrocytoma s/p partial resection with chemo radiation  Noted with prior hospitalization. Proximal muscle weakness noted on admission H&P not demonstrated on repeat exam; CK 40. Imaging with last hospitalization (CT H/Neck 9/14 & MRI 9/16) without abnormality. Suspect presenting symptoms are 2/2 known structural abnormality (left parietal astrocytoma resection 2012) with susceptibility to metabolic derangement (ie locus minoris resistentiae).   - holding statin  - re-evaluate as encephalopathy clears    # Severe malnutrition in the context of chronic illness  - PTA folic acid/B12, D3,  Thiamine  - Regular Adult Diet/ thin liquids; boost between meals  - Nutrition following, daily calorie counts (-->10/22)     # Chronic conditions  # History of seizures: cont PTA  Carbamazepine 200 mg bid  # Hypothyroidism: cont PTA  Levothyroxine 88mcg qday  # MDD: cont PTA mirtazapine 45mg qpm, methylphenidate 10mg bid    #Deconditioning  #Falls  \"The patient has a mobility limitation that significantly impairs his/her ability to participate in one or more mobility-related activities of daily living (MRADLs). The patient's mobility limitation cannot be sufficiently resolved by the use of an appropriately fitted cane or walker The patient's home provides adequate access between rooms, maneuvering space and surfaces for the use of the manual wheelchair provided. Use of a manual wheelchair will significantly improve the patient's ability to participate in MRADLs and the patient will use it on a regular basis in the home The patient has not expressed an unwillingness to use the manual wheelchair that is provided in the home. The patient has sufficient upper extremity function and other physical and mental capabilities needed to safely self-propel the manual wheelchair that is provided in the home during a typical day, OR the patient has a caregiver who is available, willing and able to provide assistance with " "the wheelchair when the patient has limitations. 5' 10\"  10/18/18 : 78 kg (172 lb)  Length of need: 99 months  Treatment Diagnosis: Hepatic encephalopathy (h) Oligoastrocytoma of parietal lobe (h)  (primary encounter diagnosis)  Frequent falls, weakness, imbalance. Cannot walk safely with a cane or walker from room to room due to multiple sudden falls in the home  - Recommend wheelchair for home use  - PT/OT Consult placed, appreciate recs    Diet: Regular Adult Diet/ thin liquids; boost between meals  Fluids: None  DVT Prophylaxis: Pneumatic Compression Devices; chemical contraindicated  Code Status: DNR/DNI    Disposition Plan   Expected discharge: 2 - 3 days, recommended to prior living arrangement once adequate pain management/ tolerating PO medications and mental status at baseline.     Entered: Bentley Osborne 10/22/2018, 7:12 AM   Information in the above section will display in the discharge planner report.      The patient's care was discussed with the attending physician, Dr. Lynn.    I saw this patient together with Chichi and was present during their physical exam and performed an independent exam at the same time which confirmed findings.  I have edited this note as appropriate to reflect team plan for today.  Patient was seen and discussed with attending physician, Dr. Lynn, who is also in agreement with above.    Patrick Aragon MD  Internal Medicine - Dermatology PGY 1  [phone]      Interval History   Overnight nursing notes and vitals reviewed. Tolerated diagnostic paracentesis well. This morning, patient reports significant improvement in cough, although intermittently still present. Afebrile and vitals stable for past 12h. Received aggressive lactulose administration yesterday and reported 4 BM with no requirement for evening dose. His mentation is much clearer this AM with AxO x3, clear understanding, logical, and conversational.     4 point ROS conducted, with pertinent positives/negatives " discussed above.    Physical Exam   Vital Signs: Temp: 98.2  F (36.8  C) Temp src: Axillary BP: 114/79   Heart Rate: 74 Resp: 18 SpO2: 96 % O2 Device: None (Room air)    Weight: 160 lbs 3.2 oz  General Appearance: Lying in bed, conversational and clearer understanding this morning. A&O x3,  NAD.    Respiratory: Clear to auscultation bilaterally.  Cardiovascular: RRR no m/r/g.  GI: Distended but soft and non-tender. Previous paracentesis bruising present.  Ext: WWP with minimal 1+ edema at ankle  Neuro: CN II-XII intact. B/l asterixis present.    Labs/Imaging/Vitals/Meds: Reviewed in Epic

## 2018-10-22 NOTE — PROGRESS NOTES
[facility]  Patient is currently open to home care services with [facility].  The patient is currently receiving RN/PT/OT/SLP services.  RN performs weekly med set, diabetic management, and assessment of ascites. SLP for cognitive linguistic treatment.  [facility]  and team have been notified of patient admission.  [facility] liaison will continue to follow patient during stay.  If appropriate provide orders to resume home care at time of discharge.    Thank you  Sindi Gagnon RN, BSN  [facility] Liaison  [facility]  [phone]

## 2018-10-23 NOTE — TELEPHONE ENCOUNTER
Message routed to Dr. Kelly on 10/18/2018 to update.    Shalini GERMAN RN Coordinator  Dr. Aragon, Dr. Dickens & Dr. Kelly   [facility]  [phone]

## 2018-10-23 NOTE — PROCEDURES
CAPS Team Procedure Note:    Procedure Name: Diagnostic Therapeutic paracentesis  Proceduralist (primary): Rayna Bowman  Pre-procedure diagnosis: ascites  Post-procedure diagnosis: ascites  Indication: abdominal distention, worsening ascites    Consent was obtained from the patient the patient previously and verified to be in the chart.   Ultrasound used to locate ascites; optimal pocket found to be in LLQ, and marked.  POCUS images permanently saved in the EMR.  Universal protocol performed with nursing.   10ml of 1% lidocaine used to anesthetize with 25 gauge needle.   8fr catheter used to obtain fluid, which was clear yellow in appearance, and was sent to lab.   8L total fluid obtained.  Patient tolerated without any apparent complications.  Primary team notified.    Rayna Bowman MD  Internal Medicine Hospitalist  [phone]

## 2018-10-23 NOTE — PLAN OF CARE
Problem: Patient Care Overview  Goal: Plan of Care/Patient Progress Review  PT: Attempted to see pt for therapy session x2, pt declining d/t need for rest, despite encouragement from writer on importance w/ potential discharge tomorrow. Will reschedule.

## 2018-10-23 NOTE — TELEPHONE ENCOUNTER
Spoke with Angelique and she states that the order for the wheelchair is not sufficient. Per angelique, note is Epic from 10/18 has reason and when needs to be written in note for this to be approved. Sent to [facility] to advise. Rashmi Oakley CMA at 11:56 AM on 10/23/2018

## 2018-10-23 NOTE — PLAN OF CARE
[id number] hours: Afebrile, vital signs are stable. Oriented x 4, but can be forgetful. Bed alarm on for safety. Albumin given s/p paracentesis with 8L ascites output. Dressing is clean, dry and intact. Patient had his third bowel movement this shift. Up with assist x 1, walker and gait belt.

## 2018-10-23 NOTE — PLAN OF CARE
Problem: Patient Care Overview  Goal: Plan of Care/Patient Progress Review  Potassium level 3.2 and it got replaced. Potassium lab level recheck scheduled for 18:00 tonight. Pt had a paracentesis procedure done at the bedside. 8 Liters of ascites fluids was drain. Plan for albumin infusion for tonight. Albumin level low 2.6.  Incision site located on left lower abd is c/d/i. No drainage. No c/o of pain at incision site. Pt had 2 BM's this shift. Continue with lactulose. Goal is to have 3-4 BM/day for lactulose. Pt oriented X 4. At times, pt is forgetful to situation and why he was here. Plan to transition to oral antibiotics tomorrow and possible discharge to home tomorrow. Bladder scan at 13:00 at shows 134ml. No straight cath needed. Continue to monitor strict Intake and output. MD notified to update pt's wife, Yisel regarding discharge plans.

## 2018-10-23 NOTE — PROGRESS NOTES
Patient received on 7d at 2000. See assessment as documented. VSS. 3 loose stools charted today, lactulose held per order. MD notified that patient still has a thin liquids diet ordered, will address with day shift MD.

## 2018-10-23 NOTE — TELEPHONE ENCOUNTER
[facility] Call Center    Phone Message    May a detailed message be left on voicemail: yes    Reason for Call: Other: Angelique from [facility] calling re: pts wheelchair. They are still having issues getting the orders. Please call back to discuss.     Action Taken: Message routed to:  [facility]

## 2018-10-23 NOTE — PROGRESS NOTES
[facility], [place]    Internal Medicine Progress Note - [facility] Service    Main Plans for Today    - Therapeutic paracentesis  10/23: 8L removed  - Albumin 50g IV post para  - Transitioned from ceftriaxone to Augmentin 875mg for 4 days (total of 7 days abx)  - Titrating lactulose to 3-4 stools  - K repletion protocol        Assessment & Plan   Mono Varghese is a 48 year old male with PMH significant for left parietal astrocytoma s/p resection, ESLD c/b refractory ascites requiring every 2 week genia (reportedly removing 8-12 L each time and receiving albumin), hepatic encephalopathy, rectal and esophageal varices and PVT not on anticoagulation who presented to the ED 10/18  with worsening encephalopathy since rectal endoscopy with coiling of rectal varices on 10/15.     #Severe Sepsis 2/2 E coli bacteremia of unclear source  Noted to be febrile, tachycardic and tachypneic during coughing fit 10/20, although normal O2 sat. LA elevated at 4.9. Patient transferred to intermediate care for airway monitoring. CBC unremarkable. Chest/ab/neck xray/flexible laryngoscopy unremarkable for any new airway and lung opacities/foreign body. Bclx (left hand) growing e coli (FQ/ bactrim resistant).. UA / diagnostic paracentesis (SAAG 2.2) without evidence of infection (though 6 WBCs on UA); presently covered with ceftriaxone.HIDA unrevealing. GI curbside determined that rectal coiling likely source of bacteremia. Zosyn (10/20 -->10/21 ); Ceftriaxone (10/22 -10/23). Based on susceptibilities will continue abx therapy with Augmentin to complete 7 days of abx.   - Started Augmentin 875mg Q12H for additional 4 days to complete 7 day course  - f/u BCX, ascites cx  - will follow clinically     #Intermittent dry coughing fit, improved  Patient started having sudden onset of coughing fit 10/20, without any witnessed aspiration event. Initially concerned for tooth aspiration but no evidence on multiple imaging  studies / flex laryngoscopy. No response to bronchodilator. Possible diaphragmatic irritation?  - Continue to monitor    # Encephalopathy  # EtOH cirrhosis c/b hepatic encephalopathy  Similar presentation to previous hospitalization 9/14-9/18. At that time cleared with aggressive lactulose administration. Multiple imaging studies this year with MRI / CT head/neck during last stay. No intracranial pathology discovered. Chief concern at present is for worsened hepatic encephalopathy with ammonia above baseline but unclear reason for change. US abdomen with dopplers showed decreased PVT and non-sepicific gallbladder wall thickening / hyperemia. Additionally with elevated alk phos (though noted at baseline) and direct hyperbilirubinemia. Abdominal exam benign and no infectious signs but would pursue paracentesis to r/o infection. Redemonstrated pancytopenia but worsened leukopenia. Hgb stable; no evidence of bleed. Additionally considering Wernicke's given alcohol history. Will titrate lactulose to assure adequate defecation.  MELD-Na score: 12 at 10/23/2018  6:41 AM  MELD score: 12 at 10/23/2018  6:41 AM  Calculated from:  Serum Creatinine: 0.92 mg/dL (Rounded to 1) at 10/23/2018  6:41 AM  Serum Sodium: 139 mmol/L (Rounded to 137) at 10/23/2018  6:41 AM  Total Bilirubin: 1.6 mg/dL at 10/23/2018  6:41 AM  INR(ratio): 1.40 at 10/23/2018  6:41 AM  Age: 48 years  - PT/OT consults  - Therapeutic paracentesis  10/23: 8L removed  - Albumin 50g IV post para  - Continue PTA Lactulose and Rifaximin  - PRN lactulose, titrate to 3-4 soft formed stools  - High dose thiamine supplementation  - Continue PTA levofloxacin  - Holding PTA lasix  - Follow-up blood cultures  - If patient fails to clear, will evaluate for more advanced imaging / neurology workup     # Generalized weakness  # Right-sided weakness  # Left parietal astrocytoma s/p partial resection with chemo radiation  Noted with prior hospitalization. Proximal muscle  "weakness noted on admission H&P not demonstrated on exam after (improvement with lactulose?); CK 40. Imaging with last hospitalization (CT H/Neck 9/14 & MRI 9/16) without abnormality. Suspect presenting symptoms are 2/2 known structural abnormality (left parietal astrocytoma resection 2012) with susceptibility to metabolic derangement (ie locus minoris resistentiae).   - holding statin  - re-evaluate as encephalopathy clears     # History of seizures  - PTA carbamazepine     # Malnutrition  - PTA folic acid  - PTA methylphenidate  - PTA thiamine  - high protein/KCal diet  - 2g Na diet  - Nutrition consult    #Deconditioning  \"The patient has a mobility limitation that significantly impairs his/her ability to participate in one or more mobility-related activities of daily living (MRADLs). The patient's mobility limitation cannot be sufficiently resolved by the use of an appropriately fitted cane or walker The patient's home provides adequate access between rooms, maneuvering space and surfaces for the use of the manual wheelchair provided. Use of a manual wheelchair will significantly improve the patient's ability to participate in MRADLs and the patient will use it on a regular basis in the home The patient has not expressed an unwillingness to use the manual wheelchair that is provided in the home. The patient has sufficient upper extremity function and other physical and mental capabilities needed to safely self-propel the manual wheelchair that is provided in the home during a typical day, OR the patient has a caregiver who is available, willing and able to provide assistance with the wheelchair when the patient has limitations. 5' 10\"  10/18/18 : 78 kg (172 lb)  Length of need: 99 months  Treatment Diagnosis: Hepatic encephalopathy (h) Oligoastrocytoma of parietal lobe (h)  (primary encounter diagnosis)  Frequent falls, weakness, imbalance\"  - PT/OT Consult placed, appreciate recs    Diet: 2 g Na diet  Fluids: " None  DVT Prophylaxis: Pneumatic Compression Devices; chemical contraindicated  Code Status: will address with primary care giver    Disposition Plan   Expected discharge: Tomorrow, recommended to prior living arrangement once adequate pain management/ tolerating PO medications and mental status at baseline.     Entered: Jenna Baeza 10/23/2018, 3:17 PM   Information in the above section will display in the discharge planner report.      The patient's care was discussed with the attending physician, Dr. Patiño.    Jenna Baeza MD  Internal Medicine PGY 1  [phone]  Please see sticky note for cross cover information    Interval History   Overnight H&P, nursing notes and vitals reviewed. No complaints this morning. Spoke to wife regarding discharge tomorrow and answered questions. Wife stated she feels confident in her ability to care for patient on discharge..     4 point ROS conducted, with pertinent positives/negatives discussed above.    Physical Exam   Vital Signs: Temp: 96.4  F (35.8  C) Temp src: Oral BP: 112/72   Heart Rate: 72 Resp: 16 SpO2: 95 % O2 Device: None (Room air)    Weight: 169 lbs 9.6 oz  General Appearance: AAOx3.  word finding difficulties, slow speech  Respiratory: Clear to auscultation bilaterally.  Cardiovascular: RRR no m/r/g.  GI: Distended but soft and non-tender. Previous paracentesis bruising present.      Labs/Imaging/Vitals/Meds: Reviewed in Epic

## 2018-10-23 NOTE — PLAN OF CARE
Problem: Patient Care Overview  Goal: Plan of Care/Patient Progress Review    VSS. Afebrile. Slept during the night. No complaints. BS checked this am and was 100. Neuros stable. Minimal to no change from previous shift. Bed alarm on for safety. Pt alert and follows commands. When questioned, pt could not voice the date (but did know that it was October), or the year. Has been appropriate. No unusual behaviors noted.     Addendum @ 4127: Pt did not void during the night shift. Does not feel the need to at this time. Will bladder scan.

## 2018-10-23 NOTE — PLAN OF CARE
Problem: Patient Care Overview  Goal: Plan of Care/Patient Progress Review  Discharge Planner SLP   Patient plan for discharge: none stated  Current status: SLP: Pt remains appropriate for regular textures and thin liquids with feeding assistance. Pt should be fully upright and alert for all PO, take small sips/bites and pace self. Suspect pt is at baseline swallow function; goals met.   Barriers to return to prior living situation: none per ST  Recommendations for discharge: defer to PT/OT  Rationale for recommendations: pt is currently tolerating baseline diet level without difficulty; will complete ST orders.        Entered by: Ne Saab 10/23/2018 10:02 AM       Speech Language Therapy Discharge Summary    Reason for therapy discharge:    All goals and outcomes met, no further needs identified.    Progress towards therapy goal(s). See goals on Care Plan in [facility] electronic health record for goal details.  Goals met    Therapy recommendation(s):    No further therapy is recommended.

## 2018-10-23 NOTE — PROGRESS NOTES
Calorie Counts    Intake recorded for: 10/22 Kcals: 202 Protein: 5g    # Meals Recorded: 100% pineapple, applesauce, 33% mac & cheese     # Supplements Recorded: 0

## 2018-10-23 NOTE — PROGRESS NOTES
Care Coordinator - Discharge Planning    Admission Date/Time:  10/18/2018  Attending MD:  Toni Patiño MD     Data  Date of initial CC assessment:  10/19  Chart reviewed, discussed with interdisciplinary team.   Patient was admitted for:   1. Oligoastrocytoma of parietal lobe (H)    2. Hepatic encephalopathy (H)         Assessment   Full assessment completed in previous note    Coordination of Care and Referrals: Per previous RNCC notes, [facility] still needing an updated MD progress note faxed to them. Writer faxed the MD note to April at [facility]. April called back to report they have processed wheelchair order and will call pt's wife, Yisel, to arrange delivery to home (which was requested by family at bedside).    Referral remains in place to resume skilled home care through [facility].        Plan  Anticipated Discharge Date:  TBD- pt just tx out of ICU   Anticipated Discharge Plan:  home      Judy Hammonds  7D Heme/Onc RN Care Coordinator  Pager [phone]

## 2018-10-23 NOTE — PROGRESS NOTES
CLINICAL NUTRITION SERVICES - BRIEF NOTE     Nutrition Prescription    RECOMMENDATIONS FOR MDs/PROVIDERS TO ORDER:  - Please encourage po intakes.  - If pt remains hospitalized > 72 hrs without diet interruption, recommend reordering calorie counts to help quantify po adequacy.          Nutrition progress note - f/u for progress towards previous nutrition POC.    Calorie counts ordered on 10/20 x 3 days, however, only one day of data obtained consisting of 202 caloires and 5 g PRO.  Per chart review, calorie count data collection incomplete in part d/t NPO status ordered on 10/20 at 1515 and discontinued on 10/21 @ 1100 am.     Lia Haider RD,LD  Unit pager [phone]

## 2018-10-23 NOTE — DISCHARGE SUMMARY
[facility], [place]    Internal Medicine Discharge Summary- [facility]    Date of Admission:  10/18/2018  Date of Discharge:  10/24/2018  Discharging Attending Provider: Haroon   Discharge Team: Piedad De    Discharge Diagnoses   E Coli Bacteremia 2/2 to recent rectal variceal coiling    Follow-ups Needed After Discharge   Consider palliative care consult    Hospital Course   Mono Varghese is a 48 year old male with PMH significant for left parietal astrocytoma s/p resection, ESLD c/b refractory ascites requiring every 2 week genia (reportedly removing 8-12 L each time and receiving albumin), hepatic encephalopathy, rectal and esophageal varices and PVT not on anticoagulation who presented to the ED 10/18  with worsening encephalopathy since rectal endoscopy with coiling of rectal varices on 10/15.       # Encephalopathy  #Severe Sepsis 2/2 E coli bacteremia of unclear source  # EtOH cirrhosis c/b hepatic encephalopathy  Patient's presentation to previous hospitalization 9/14-9/18 which was was cleared with aggressive lactulose administration. Further workup was pursued with an abdominal US abdomen with dopplers showing decreased PVT and non-sepicific gallbladder wall thickening / hyperemia. On 10/22 blood cultures returned with E Coli. Work up for source of infection included diagnostic paracentesis, UA, abdominal US and HIDA scan which were unremarkable. The E Coli was thought to be secondary to his recent rectal variceal coiling. Notably the E Coli was resistant to levofloxacin which he was placed on post-procedurally as prophylaxis. He was started on vanc/zosyn when blood cultures returned and then transitioned to Augmentin po when sensitivities returned.        Also of note, patient had therapeutic paracentesis performed on 10/23/2018. 8L were removed and 50g of albumin given. Patient tolerated this well.     #Intermittent dry coughing fit, improved  Patient started having  sudden onset of coughing fit 10/20, without any witnessed aspiration event. Initially concerned for tooth aspiration but no evidence on multiple imaging studies / flex laryngoscopy. No response to bronchodilator. This resolved without further intervention     # Generalized weakness  # Right-sided weakness  # Left parietal astrocytoma s/p partial resection with chemo radiation  Noted with prior hospitalization. Proximal muscle weakness noted on admission H&P not demonstrated on exam after (improvement with lactulose?); CK 40. Imaging with last hospitalization (CT H/Neck 9/14 & MRI 9/16) without abnormality. Suspect presenting symptoms were 2/2 known structural abnormality (left parietal astrocytoma resection 2012) with susceptibility to metabolic derangement (ie locus minoris resistentiae).       # History of seizures: PTA carbamazepine continued      # Malnutrition: PTA folic acid, methylphenidate and thiamine continued.    Consultations This Hospital Stay   MEDICATION HISTORY IP PHARMACY CONSULT  PHYSICAL THERAPY ADULT IP CONSULT  OCCUPATIONAL THERAPY ADULT IP CONSULT  NUTRITION SERVICES ADULT IP CONSULT  INTERNAL MEDICINE PROCEDURE TEAM ADULT IP CONSULT [facility] - [facility]  INTERNAL MEDICINE PROCEDURE TEAM ADULT IP CONSULT [facility] - [facility]  PHARMACY TO DOSE VANCO  VASCULAR ACCESS CARE ADULT IP CONSULT  SWALLOW EVAL SPEECH PATH AT BEDSIDE IP CONSULT  INTERNAL MEDICINE PROCEDURE TEAM ADULT IP CONSULT [facility] - [facility]AC[facility]ESIS  INTERNAL MEDICINE PROCEDURE TEAM ADULT IP CONSULT [facility] - PARACENTESIS    Code Status   DNR/DNI       The patient was discussed with Dr. Haroon Beyer MD  [facility]  Pager: [phone]  ______________________________________________________________________    Physical Exam   Vital Signs: Temp: 96.3  F (35.7  C) Temp src: Oral BP: 109/70   Heart Rate: 72 Resp: 16 SpO2: 99 % O2 Device: None (Room air) Oxygen Delivery: 2 LPM  Weight: 169 lbs  9.6 oz    General Appearance:                AAOx3.  word finding difficulties, slow speech  Respiratory: Clear to auscultation bilaterally.  Cardiovascular: RRR no m/r/g.  GI: Distended but soft and non-tender. Previous paracentesis bruising present.       Significant Results and Procedures   Results for orders placed or performed during the hospital encounter of 10/18/18   XR Chest Port 1 View    Narrative    Exam: XR CHEST PORT 1 VW 10/18/2018 8:12 PM    History: For aspiration pneumonia    Comparison: 9/14/2018    Findings: Single 35 degrees upright view of the chest. Right IJ  Port-A-Cath tip projects over the high right atrium. Cardiomediastinal  silhouette is stable. Pulmonary vasculature is within normal limits.  Lung lines are also normal. Minimal streaky bibasilar opacities, right  more so than left. These are stable. There are no new pulmonary  opacities. No pleural effusion or pneumothorax.      Impression    Impression: No new pulmonary opacities to suggest aspiration.    KAYODE ANDRADE MD   US Abdomen Limited w Abd/Pelv Duplex Complete Port     Value    Radiologist flags (Urgent)     Cannot exclude acute cholecystitis; consider nuclear    Narrative    EXAMINATION: US ABDOMEN LIMITED WITH DOPPLER COMPLETE PORTABLE  10/19/2018 11:22 AM     COMPARISON: Ultrasound 9/6/2018    HISTORY: Encephalopathy concern for decompensation, question worsening  portal venous thrombus or cholecystitis    TECHNIQUE: The abdomen was scanned in standard fashion with  specialized ultrasound transducer(s) using both gray-scale, color  Doppler, and spectral flow techniques.    Findings:  Liver: Cirrhotic morphology of the liver with coarsened echotexture,  increased echogenicity of the parenchyma, and nodular contour. No  focal intrahepatic mass identified.     Extrahepatic portal vein flow is partially occluded with nonvascular  thrombus, with antegrade flow around the occlusion at 23 cm/s.  Right portal vein flow is  antegrade, measuring 18. No thrombus  identified in the right portal vein.  Left portal vein flow is antegrade, measuring 21. Decreased  nonocclusive thrombus in the left portal vein.    Flow in the hepatic artery is towards the liver and:  84 peak systolic  0.69 resistive index.     The splenic vein is not identified. The left, middle, and right  hepatic veins are patent with flow towards the IVC. The IVC is patent  with flow towards the heart.   The visualized aorta is not dilated.    Gallbladder: Increased gallbladder wall thickening since 9/6/2018.  Small echogenic shadowing stones are partially visualized near the  gallbladder neck. The entire bladder is not well-visualized.  Gallbladder wall is hyperemic.    Bile Ducts: No intrahepatic biliary ductal dilatation.  The common  bile duct measures 4 mm.    Fluid: Moderate volume anechoic ascites.      Impression    Impression:   1.  Decreased nonocclusive thrombus in the main and left portal veins,  and resolution of thrombus in the right portal vein. Antegrade flow in  the hepatic vasculature.  2.  Cirrhosis. No discrete hepatic lesion.  3.  Increased gallbladder wall thickening since 9/6/2018 with  cholelithiasis, nonspecific in the setting of chronic liver disease  and ascites. Distally, the gallbladder wall is hyperemic. Given  provided history and clinical concern, cholecystitis is difficult to  exclude. Consider nuclear medicine hepatobiliary (HIDA) scan.  4.  Moderate ascites.    [Access Center: Cannot exclude acute cholecystitis; consider nuclear  medicine hepatic biliary scan]    This report will be copied to the [facility] to ensure a  provider acknowledges the finding. Access Center is available Monday  through Friday 8am-3:30 pm.     I have personally reviewed the examination and initial interpretation  and I agree with the findings.    KAYODE ANDRADE MD   XR Chest Port 1 View    Narrative    Exam: XR CHEST PORT 1 VW, 10/20/2018 3:44  PM    Indication: sudden onset coughing; c/f aspiration;     Comparison: Chest x-ray 10/18/2018    Findings:   Upright frontal chest x-ray. Right chest wall Port-A-Cath with tip  projecting over the right atrium. Unchanged cardiac silhouette. No  acute airspace opacity. Low lung volume. No pneumothorax or  significant pleural effusion.      Impression    Impression: No new pulmonary opacity to suggest aspiration.     I have personally reviewed the examination and initial interpretation  and I agree with the findings.    BRIAN WORLEY MD   XR Abdomen Port 1 View    Narrative    Exam: XR ABDOMEN PORT 1 VW, 10/20/2018 4:50 PM    Indication: assess intrabdominal air;     Comparison: None    Findings:   Frontal view of the abdomen. The right and upper abdomen are  collimated off the field-of-view. Nonobstructive bowel gas pattern. No  definite free air on this supine study. No portal venous gas. No  pneumatosis.      Impression    Impression: The upper abdomen and far right side of the abdomen are  collimated off the field-of-view. No definite free air on this supine  view. Consider lateral decubitus film if there is significant concern  for free air.    I have personally reviewed the examination and initial interpretation  and I agree with the findings.    BRIAN WORLEY MD   CT Chest Pulmonary Embolism w Contrast    Narrative    EXAMINATION: CT CHEST PULMONARY EMBOLISM W CONTRAST, 10/20/2018 9:02  PM    CLINICAL HISTORY: sudden onset cough, hypertension and tachycardia;     COMPARISON: Chest x-ray 10/20/2018.    TECHNIQUE: CT imaging obtained through the chest with contrast.  Coronal and axial MIP reformatted images obtained. Three-dimensional  (3D) post-processed angiographic images were reconstructed, archived  to PACS and used in interpretation of this study.     CONTRAST: 58 ml isovue 370 IV    FINDINGS:    Lines and tubes: Chest wall Port-A-Cath with tip in the right atrium    Vessels: No central filling defect  identified.  No aortic aneurysm.     Mediastinum: No thyroid nodules. Central tracheobronchial tree is  patent. Heart size is normal. No pericardial effusion.  Normal  thoracic vasculature. No thoracic lymphadenopathy.     Lungs: No consolidation. No pleural effusion or pneumothorax. Mosaic  attenuation of the lungs likely secondary to low lung volumes.    Bones and soft tissues: Age-indeterminate compression deformity  involving the superior endplate of T10 vertebral body.    Upper abdomen: Massive ascites, cirrhosis and splenomegaly         Impression    IMPRESSION:   1. No pulmonary embolism.  2. Massive ascites  3. Cirrhosis with splenomegaly.  4. Mosaic attenuation can be due to low lung volume. Consider reactive  airway disease.    I have personally reviewed the examination and initial interpretation  and I agree with the findings.    BRIAN WORLEY MD   XR Neck Soft Tissue    Narrative    Exam: XR NECK SOFT TISSUE, 10/20/2018 4:41 PM    Indication: acute cough, concern for upper airway aspiration, please  obtain lateral and anterior;     Comparison: None    Findings:   AP and lateral views of the neck. No significant prevertebral soft  tissue swelling. No evidence of obstruction in the upper airway. No  acute fracture or subluxation of the visualized cervical spine (to the  level of C4). The epiglottis appears normal. No subepiglottic  swelling. The visualized lung apices are clear.      Impression    Impression:   No abnormalities of the upper airway. No significant prevertebral soft  tissue swelling.    I have personally reviewed the examination and initial interpretation  and I agree with the findings.    BRIAN WORLEY MD   NM HepatOBiliary Scan    Narrative    Examination:  NM HEPATOBILIARY SCAN      Date: 10/22/2018 4:21 PM.    Indication:   Severe sepsis and e coli bacteremia with c/f cholangitic  source;      Additional Information: none    Technique:    The patient received 6 mCi of Tc-99m Choletec  intravenously. Images  were obtained out through 60 minutes.   At 60 minutes the patient  received 28 g of oral fat within boost liquid nutrition. An additional  45 minutes of images were obtained after the gall bladder contraction  intervention.    Findings:    Prompt radiotracer uptake within the liver. The radiotracer washout  within the liver is slightly delayed more than expected, likely  secondary to underlying cirrhosis. By 20 minutes, there is  visualization of the gallbladder. At the end of the 60 minutes, the  gallbladder is entirely filled and the hepatic uptake is significantly  decreased.    After oral fat administration, there is free radiotracer within the  bowels. Intra and extrahepatic biliary ducts are not dilated. On the  other hand, the gallbladder is not contracted and no radiotracer  extraction was visualized. The ejection fraction is 9% and abnormally  low. No enterogastric reflux was identified.     Impression    Impression:    Impression:  1. Low gallbladder ejection fraction consistent with chronic  gallbladder disease.  2. No evidence of acute cholecystitis.  3. Subtle delayed hepatic radiotracer extraction likely from  underlying cirrhosis.      =======================    The normal Gall Bladder ejection fraction for a 45 minute infusion is  >40%    I have personally reviewed the examination and initial interpretation  and I agree with the findings.    ELIAS RABAGO MD   POC US Guide for Paracentesis    Narrative    CAPS Team Procedure Note:     Procedure Name: Diagnostic Therapeutic paracentesis  Proceduralist (primary): Rayna Bowman  Pre-procedure diagnosis: ascites  Post-procedure diagnosis: ascites  Indication: abdominal distention, worsening ascites     Consent was obtained from the patient the patient previously and verified to be in the chart.   Ultrasound used to locate ascites; optimal pocket found to be in LLQ, and marked.  POCUS images permanently saved in the  EMR.  Universal protocol performed with nursing.   10ml of 1% lidocaine used to anesthetize with 25 gauge needle.   8fr catheter used to obtain fluid, which was clear yellow in appearance, and was sent to lab.   8L total fluid obtained.  Patient tolerated without any apparent complications.  Primary team notified.     Rayna Bowman MD  Internal Medicine Hospitalist  [phone]     *Note: Due to a large number of results and/or encounters for the requested time period, some results have not been displayed. A complete set of results can be found in Results Review.       Pending Results   These results will be followed up by medicine team  Unresulted Labs Ordered in the Past 30 Days of this Admission     Date and Time Order Name Status Description    10/23/2018 1114 Fluid Culture Aerobic Bacterial In process     10/23/2018 1114 Cell count with differential fluid In process     10/21/2018 2200 Blood culture Preliminary     10/21/2018 0933 Blood culture Preliminary     10/21/2018 0931 Blood culture Preliminary     10/20/2018 1308 Cytology non gyn (Fluid) In process     10/20/2018 1308 fluid culture - Aerobic Bacterial Preliminary     10/18/2018 2156 Blood culture Preliminary     10/18/2018 1929 Blood culture Preliminary     9/26/2018 1625 T4 free In process              Primary Care Physician   [place]    Discharge Disposition   Discharged to home  Condition at discharge: Fair    Discharge Orders     Home care nursing referral     Home Care PT Referral for Hospital Discharge     Home Care OT Referral for Hospital Discharge     Home Care SLP Referral for Hospital Discharge     Medication Therapy Management Referral     Discharge Equipment: Wheelchair   The patient has a mobility limitation that significantly impairs his/her ability to participate in one or more mobility-related activities of daily living (MRADLs).  The patient's mobility limitation cannot be sufficiently resolved by the use of an  "appropriately fitted cane or walker  The patient's home provides adequate access between rooms, maneuvering space and surfaces for the use of the manual wheelchair provided.  Use of a manual wheelchair will significantly improve the patient's ability to participate in MRADLs and the patient will use it on a regular basis in the home  The patient has not expressed an unwillingness to use the manual wheelchair that is provided in the home.  The patient has sufficient upper extremity function and other physical and mental capabilities needed to safely self-propel the manual wheelchair that is provided in the home during a typical day, OR the patient has a caregiver who is available, willing and able to provide assistance with the wheelchair when the patient has limitations.  5' 10\"   10/18/18 : 78 kg (172 lb)    Length of need: 99 months    Treatment Diagnosis: Hepatic encephalopathy (h)  Oligoastrocytoma of parietal lobe (h)  (primary encounter diagnosis)   Frequent falls, weakness, imbalance     Reason for your hospital stay   Mono Varghese was hospitalized with worsening confusion found to have E Coli bacteremia (bacteria in his blood stream) likely from his recent rectal endoscopy with rectal varice coiling.     Adult [facility] Follow-up and recommended labs and tests   Follow up with primary care provider, [place], within 7 days for hospital follow- up.  No follow up labs or test are needed.      Appointments on [place] and/or [facility] (with [facility] or [facility] provider or service). Call [phone] if you haven't heard regarding these appointments within 7 days of discharge.     Activity   Your activity upon discharge: activity as tolerated     DNR/DNI     Diet   Follow this diet upon discharge: Orders Placed This Encounter     Calorie Counts     Snacks/Supplements Adult: Boost Shake; Between Meals     Room Service     Regular Diet Adult Thin Liquids (water, ice chips, juice, milk, gelatin, ice " cream, etc)       Discharge Medications   Current Discharge Medication List      START taking these medications    Details   amoxicillin-clavulanate (AUGMENTIN) 875-125 MG per tablet Take 1 tablet by mouth every 12 hours for 4 days  Qty: 8 tablet, Refills: 0    Associated Diagnoses: E coli bacteremia      folic acid (FOLVITE) 1 MG tablet Take 1 tablet (1 mg) by mouth daily  Qty: 30 tablet, Refills: 0    Associated Diagnoses: Protein-calorie malnutrition, unspecified severity (H)         CONTINUE these medications which have NOT CHANGED    Details   Calcium Carb-Cholecalciferol (CALCIUM 500 +D) 500-400 MG-UNIT TABS Take 500 mg by mouth daily  Qty: 90 tablet, Refills: 1    Associated Diagnoses: Malnutrition (H)      carBAMazepine (TEGRETOL) 200 MG tablet Take 1 tablet (200 mg) by mouth 2 times daily  Qty: 120 tablet, Refills: 11    Associated Diagnoses: Partial symptomatic epilepsy with complex partial seizures, not intractable, without status epilepticus (H)      ciclopirox (LOPROX) 0.77 % cream Apply topically 2 times daily To feet and toenails.  Qty: 90 g, Refills: 4    Associated Diagnoses: Tinea pedis of both feet      cyanocobalamin 1000 MCG TABS Take 1 tablet by mouth daily      ferrous sulfate (IRON) 325 (65 FE) MG tablet Take 1 tablet (325 mg) by mouth 2 times daily  Qty: 200 tablet, Refills: 3    Associated Diagnoses: Other iron deficiency anemia      furosemide (LASIX) 20 MG tablet Take 1 tablet (20 mg) by mouth daily  Qty: 30 tablet, Refills: 3    Associated Diagnoses: Other ascites      gabapentin (NEURONTIN) 100 MG capsule Take 1 capsule (100 mg) by mouth At Bedtime  Qty: 30 capsule, Refills: 3    Associated Diagnoses: Mild episode of recurrent major depressive disorder (H)      hydrOXYzine (ATARAX) 25 MG tablet Take 1 tablet (25 mg) by mouth 2 times daily  Qty: 180 tablet, Refills: 1    Associated Diagnoses: Itching; Anxiety      lactulose (CHRONULAC) 10 GM/15ML solution Take 30 mLs (20 g) by mouth 3  times daily  Qty: 946 mL, Refills: 3    Comments: Titrate lactulose to 3-4 bowel movements per day  Associated Diagnoses: Hepatic encephalopathy (H)      levothyroxine (SYNTHROID/LEVOTHROID) 88 MCG tablet Take 1 tablet (88 mcg) by mouth daily  Qty: 90 tablet, Refills: 1    Associated Diagnoses: Hypothyroidism      methylphenidate (RITALIN) 10 MG tablet Take 1 tablet (10 mg) by mouth 2 times daily  Qty: 60 tablet, Refills: 0    Associated Diagnoses: Attention deficit hyperactivity disorder (ADHD), unspecified ADHD type      mirtazapine (REMERON) 45 MG tablet Take 1 tablet (45 mg) by mouth At Bedtime  Qty: 30 tablet, Refills: 2    Associated Diagnoses: Major depressive disorder with single episode, in partial remission (H)      multivitamin, therapeutic with minerals (THERA-VIT-M) TABS Take 1 tablet by mouth 2 times daily      omeprazole (PRILOSEC) 20 MG CR capsule Take 2 capsules (40 mg) by mouth 2 times daily  Qty: 360 capsule, Refills: 3    Associated Diagnoses: Gastroesophageal reflux disease without esophagitis      pravastatin (PRAVACHOL) 80 MG tablet Take 1 tablet (80 mg) by mouth daily  Qty: 90 tablet, Refills: 3    Associated Diagnoses: High cholesterol      spironolactone (ALDACTONE) 25 MG tablet Take 2 tablets (50 mg) by mouth daily  Qty: 180 tablet, Refills: 1    Associated Diagnoses: Other ascites      thiamine (VITAMIN B-1) 100 MG tablet Take 1 tablet (100 mg) by mouth daily  Qty: 100 tablet, Refills: 1    Associated Diagnoses: Alcoholic cirrhosis of liver with ascites (H)      traZODone (DESYREL) 50 MG tablet Take 1 tablet (50 mg) by mouth nightly as needed for sleep  Qty: 30 tablet, Refills: 2    Associated Diagnoses: Primary insomnia      Vitamin D, Cholecalciferol, 1000 units TABS Take 1 tablet by mouth daily      XIFAXAN 550 MG TABS tablet TAKE ONE TABLET BY MOUTH TWICE DAILY  Qty: 60 tablet, Refills: 11    Comments: Please consider 90 day supplies to promote better adherence  Associated Diagnoses:  "Hepatic encephalopathy (H)      blood glucose monitoring (ONETOUCH ULTRA) test strip Use to test blood sugars 4 times daily as needed or as directed.  Qty: 400 each, Refills: 1    Associated Diagnoses: Diabetes mellitus, type 2 (H)      CANE, ANY MATERIAL One cane  Qty: 1 each, Refills: 0    Associated Diagnoses: Balance disorder      HYDROcodone-acetaminophen (NORCO) 5-325 MG per tablet Take 2 tablets by mouth every 6 hours as needed for moderate to severe pain  Qty: 60 tablet, Refills: 0    Associated Diagnoses: Brain tumor (H)      lidocaine (LMX4) 4 % CREA cream Apply topically once as needed for mild pain  Qty: 133 g, Refills: 1    Associated Diagnoses: Port catheter in place      ONE TOUCH LANCETS MISC by In Vitro route 4 times daily as needed  Qty: 100 each, Refills: prn    Comments: As of January 1 pt's insurance will only cover onetouch or accu check.  Patient needs a  new glucometer (one touch), too  Associated Diagnoses: Type II or unspecified type diabetes mellitus without mention of complication, not stated as uncontrolled      order for DME Equipment being ordered: manual wheelchair and cushion    [employer] light weight Sx5 18x18, 17.5 inch floor height and a 2 inch 18x18 inch cushion    length of need : lifetime        Patient's height : 5/10\", weight : 172 pounds  Qty: 1 Device, Refills: 0    Associated Diagnoses: Falls frequently; At high risk for falls; Imbalance; Limited mobility         STOP taking these medications       levofloxacin (LEVAQUIN) 500 MG tablet Comments:   Reason for Stopping:             Allergies   Allergies   Allergen Reactions     Latex Itching and Rash     No Clinical Screening - See Comments      Coban and Surgilast cause itching     Tegaderm Transparent Dressing (Informational Only)      "

## 2018-10-24 NOTE — PLAN OF CARE
Problem: Patient Care Overview  Goal: Plan of Care/Patient Progress Review  PT - per plan established by the Physical Therapist, according to functional mobility the  discharge recommendation is home with good support at home. Pt is progressing well, still limited by weakness and fatigue. Pt needing SBA to CGA for all bed mob, sit to stand and stand pivot transfer with WW for standing support. Pt demo up and down 4 steps with [employer] UE on one rail. Pt and pt mom ed on tech for stairs and car transfers. Mom stating understanding of tech. Mom stating pt better than when coming to hospital.   Discharge Planner PT   Patient plan for discharge: home with assist.   Current status: see above.   Barriers to return to prior living situation: weakness and fatigue.   Recommendations for discharge: home with assist as needed.   Rationale for recommendations: pt at or near baseline, good support at home, pt getting new W/c for home. Picking up later today on their own.     At time of discharge pt met 3/3 gaols  Pt demo up to 200'x 1 on 10/22/18  Pt has WW, cane, W/c, shower chair, commode at home.   No new PT equipment from this hospital stay.          Entered by: Willis Verdin 10/24/2018 1:21 PM     Physical Therapy Discharge Summary  Reason for discharge:   All goals and outcomes met, no further needs identified   Progress towards goals:   Goals met   Recommendation(s):   No further therapy is recommended. Scheduled to disch home with A

## 2018-10-24 NOTE — PROGRESS NOTES
Focus: Discharge   D: Pt admitted for  Sepsis of E.Coli bacteremia. Pt has been transition to oral antibiotics Augmentin and has been doing well with it. No fever and VS have been stable. Alert and oriented. Pt having  intermittent forgetfulness to situation.    I: Reviewed discharge instructions with pt's mom at the bedside regarding his medications, follow up appointments in clinic, and home care services (PT/OT/ Speech and Pharmacy medication management). Heparin lock 500 units into implanted port. Wheelchair being delivered to pt's home per pt report.  A: Pt's mom stated understanding of discharge instructions and has no further questions at this time.   P: Discharge to home

## 2018-10-24 NOTE — PLAN OF CARE
Problem: Patient Care Overview  Goal: Plan of Care/Patient Progress Review  Outcome: No Change  [id number]: AVSS on RA, afebrile. Denies pain or nausea. Disoriented to situation, intermittently pleasantly confused and forgetful. Potassium recheck 3.4, no further replacements needed. Incontinent of stool x1 (total 4 BM on 10/23), held evening lactulose dose. Post-void bladder scan showed 20 ml. Pt declined to use bathroom when offered this AM, due to void. Up with 1 assist + walker. Possible discharge home today. Continue with POC.

## 2018-10-24 NOTE — TELEPHONE ENCOUNTER
----- Message from Judy Hammonds, RN sent at 10/23/2018  2:06 PM CDT -----  Regarding: wheelchair  Hi Soon-Mi,    I happened to notice that there were some ongoing calls/conversations about a wheelchair order for this patient. I wanted you to know that the inpatient MD completed a script & note and I have confirmed with [facility] that they have everything they need to get it delivered. They will be in contact with pt's wife Yisel to coordinate delivery.    Just wanted to avoid you having to do double work :)    Judy, 7D RN Care Coordinator

## 2018-10-26 NOTE — PROGRESS NOTES
[facility] now requests orders and shares plan of care/discharge summaries for some patients through [employer].  Please REPLY TO THIS MESSAGE OR ROUTE BACK TO THE AUTHOR in order to give authorization for orders when needed.  This is considered a verbal order, you will still receive a faxed copy of orders for signature.  Thank you for your assistance in improving collaboration for our patients.    ORDER   SN 1 wk 1, 2 wk 3, 3 PRN   HA 2 wk 4 starting week of 10/28   PT 1 day 1   OT 1 day 1   ST 1 day 1    MD SUMMARY/PLAN OF CARE  SUMMARY TO MD   Situation..Patient was recently discharged from hospital on 10/24 for Severe Sepsis from E Coli Bacteremia due to recent rectal variceal coiling, Hepatic Encephalopathy, Alcoholic cirrhosis of liver with ascites. Patient is now home with wife whom is his main caregiver. When she is working, mother in law is home with patient. Patient and wife live on 2 story home with 17 steps up to bedroom area. Patient has been remaining upstairs since he returned home as is too difficult to do stairs, when he did do the stairs had one person in front and one in back of him to prevent falls, this worked ok, however is now using wc for most locomotion upstairs. Did use FWW when writer was present and patient was unsteady and was difficult to go back to wc. Patient had difficulty finding words throughout the visit today making patient some what frustrated. Mother in law assisted with answering some questions and is patients step in POA. Patient is alert, oriented to day, place and time, although does require extra time to answer questions and often has difficulty getting out words or completing sentences properly. Seems to have some confusion at times. No open areas or areas of breakdown noted. PORT site CDI, continues to get accessed in clinic on paracentesis days. Reports appetite is better however is still very minimal. Has been drinking carnation high protein drinks, 1 to  2 per day to help with nutrition. BG was 174 during visit today, not currently taking anything for DM as those meds are on hold. Med rec completed,  no problems found. Pt is unable to manage med regimen on own, spouse has difficulty managing meds as well d/t her own health regimen and pt requires med set ups.  Med set up completed x 6 days. Has 3 extra day of meds set up. Reports compliance with lactulose dosing and states he has been having 3 to 4 loose bms daily as he should be. Denies any current bleeding. Reports that mood has been good, denies any thoughts of suicide and feels his meds help along with seeing his counselor and psychiatrist. Active listening used.  VS.../88, P 72, RR18, T 95.8, O2 sats 98 percent on RA, Lungs CTA  Background Hx of type 2  diabetes without mention of complication, heptatic encephalopathy, Alcoholic cirrhosis of liver with ascites complicated by variceal bleeding and hepatic encephalopathy, gastrointestnial  bleeding, malnutrition, partial epilepsy with impairment of consciousness, major depressive disorder reccurent episode/moderate, sleep disturbances, generalized  pain, BLE edema, venous thromboembolism with SMV and portal vein occlusion.  Analysis Pt is at high risk for rehospitalization d/t high  risk medications, risk for falls, chronic comorbidities  Recommedation SN for medication  management/education, chronic disease management/education, weight checks, compliance with BG monitoring, abdominal girth measurements, fall assessment, home  safety. PT eval and treat for gait training, HEP and home safety. OT for home safety eval. ST for assistance with word finding, HHA to assist with bathing, grooming and personal care

## 2018-10-29 NOTE — TELEPHONE ENCOUNTER
[facility] Call Center    Phone Message    May a detailed message be left on voicemail: yes    Reason for Call: Other: Ju from [facility] calling for an OK to continue home care 1x wk/2 wks     Action Taken: Other: [facility] P[facility]     Verbal order given and documented. Tiffanie Lentz LPN 10/29/2018 1:37 PM

## 2018-10-29 NOTE — TELEPHONE ENCOUNTER
Verbal orders given to Ju from [facility], per Dr. Louis, for Other: Ju from [facility] calling for an OK to continue home care 1x wk/2 wks. Tiffanie Lentz LPN 10/29/2018 1:38 PM

## 2018-10-30 NOTE — TELEPHONE ENCOUNTER
Edith nurse calling to update Dr Louis increased swelling in feet and legs, blood sugar is high and pt had a fall on Saturday, please call with questions or concerns

## 2018-10-31 NOTE — TELEPHONE ENCOUNTER
Soon mi can you call Wednesday to assess how things are at home, Trevin will know, I could see him later this week.

## 2018-11-01 NOTE — ED PROVIDER NOTES
History     Chief Complaint   Patient presents with     Altered Mental Status     Generalized Weakness     HPI  Mono Varghese is a 48 year old male with a history of alcoholic cirrhosis with ascites, encephalopathy, DM2, pancytopenia who presents to the Emergency Department for the evaluation of altered mental status and generalized weakness. Patient presents with family member who provides history. Patient was recently admitted 10/18-10/24 for E coli bacteremia (unknown source) and treated with Augmentin inpatient and discharged with 4 days of Augmentin. Patient's family member states that within 24 hours after taking last dose on Monday 10/29, he started to develop fever, confusion, generalized weakness, and swelling of legs. Family member states that he currently has similar presentation when he was septic last week. Patient has normal stools, 5-6 episodes daily with no blood. He is on 60 mL Lactulose daily. He uses wheelchair and walker at home.     I have reviewed the Medications, Allergies, Past Medical and Surgical History, and Social History in the [employer] system.  Past Medical History:   Diagnosis Date     ADHD      Alcoholic cirrhosis of liver with ascites (H) 06/17/2015    cirrhosis secondary to alcohol, complicated by variceal bleeding and hepatic encephalopathy      Ascites due to alcoholic cirrhosis (H)     Requiring regular paracentesis.     Chronic pain 8/1/2016     Depressive disorder 02/01/2001     Encephalopathy, hepatic (H) 7/29/2017     GERD (gastroesophageal reflux disease)      GIB (gastrointestinal bleeding) 11/23/2015    Admitted to the ICU 11/2015 for hemorrhagic shock 2/2 variceal bleed with subsequent sequelae of shock liver, multi organ dysfunction including altered mental status of unknown etiology, multiple thrombosis, decompensated liver cirrhosis, hematologic abnormalities, and JOSEF s/p banding at the end of 03/2016      H/O Oligoastrocytoma of parietal lobe (H) 06/11/2013     Presented acute onset of right-sided seizures in 4/2013. 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 status post subtotal resection by Dr. J Carlos Aranda in early 06/2013. Negative for 1p/19q deletions. S/P Concurrent chemoradiation July 15 through August 23, 2013. Initiated adjuvant oral temozolomide 9/17/13; switch     H/O LENA (obstructive sleep apnea)-moderate 12/18/2012    Pt states this is not currently an issue.     Hyperlipidemia LDL goal <100 8/1/2016     Hypothyroidism 8/1/2016     Liver failure (H)      Moderate major depression (H) 10/3/2012     Obesity      Pancytopenia (H) 8/1/2016    Chronic pancytopenia secondary to liver disease since at least 2012      Partial epilepsy with impairment of consciousness (H) 05/07/2014     Portal hypertensive gastropathy (H)      Portal vein thrombosis 12/18/2015    history of left lower extremity deep vein thrombosis as well as portal vein and superior mesenteric vein thrombosis, late 2015 when he was hospitalized in the ICU and seriously ill temporary IVC filter placed in 12/2015 when he was in the ICU with deep vein thromboses and active bleeding      Pulmonary nodules 6/17/2015     Rectal bleeding      Seizures (H)      Type 2 diabetes mellitus (H) 10/3/2012       Past Surgical History:   Procedure Laterality Date     COLONOSCOPY N/A 11/27/2015    Procedure: COMBINED COLONOSCOPY, SINGLE OR MULTIPLE BIOPSY/POLYPECTOMY BY BIOPSY;  Surgeon: Ran Thurston MD;  Location: [facility]     ENDOSCOPIC ULTRASOUND LOWER GASTROINTESTIONAL TRACT (GI) N/A 10/15/2018    Procedure: Rectal Endoscopic Ultrasound **Latex Allergy** with coiling and glueing of rectal varices;  Surgeon: Guru Jose Kelly MD;  Location: [facility]     ENDOSCOPIC ULTRASOUND UPPER GASTROINTESTINAL TRACT (GI) N/A 10/1/2018    Procedure: ENDOSCOPIC ULTRASOUND, ESOPHAGOSCOPY / UPPER GASTROINTESTINAL TRACT (GI);;   Surgeon: Guru Jose Kelly MD;  Location: [facility]     ESOPHAGOSCOPY, GASTROSCOPY, DUODENOSCOPY (EGD), COMBINED N/A 11/23/2015    Procedure: COMBINED ESOPHAGOSCOPY, GASTROSCOPY, DUODENOSCOPY (EGD);  Surgeon: Rocael Rizvi MD;  Location: [facility]     ESOPHAGOSCOPY, GASTROSCOPY, DUODENOSCOPY (EGD), COMBINED N/A 1/25/2017    Procedure: COMBINED ESOPHAGOSCOPY, GASTROSCOPY, DUODENOSCOPY (EGD), BIOPSY SINGLE OR MULTIPLE;  Surgeon: Rocael Tejada MD;  Location: [facility]     ESOPHAGOSCOPY, GASTROSCOPY, DUODENOSCOPY (EGD), COMBINED N/A 3/30/2017    Procedure: COMBINED ESOPHAGOSCOPY, GASTROSCOPY, DUODENOSCOPY (EGD);  Surgeon: Jordana Ramirez MD;  Location: [facility]     ESOPHAGOSCOPY, GASTROSCOPY, DUODENOSCOPY (EGD), COMBINED N/A 1/19/2018    Procedure: COMBINED ESOPHAGOSCOPY, GASTROSCOPY, DUODENOSCOPY (EGD);  Upper Endoscopy with verceal banding; Flexible Sigmoidoscopy;  Surgeon: Guru Jose Kelly MD;  Location: [facility]     ESOPHAGOSCOPY, GASTROSCOPY, DUODENOSCOPY (EGD), COMBINED N/A 2/12/2018    Procedure: COMBINED ESOPHAGOSCOPY, GASTROSCOPY, DUODENOSCOPY (EGD);  Esophagogastroduodenoscopy with variceal banding;  Surgeon: Guru Jose Kelly MD;  [facility]: [facility]     ESOPHAGOSCOPY, GASTROSCOPY, DUODENOSCOPY (EGD), COMBINED N/A 3/5/2018    Procedure: COMBINED ESOPHAGOSCOPY, GASTROSCOPY, DUODENOSCOPY (EGD);  Esophagogastroduodenoscopy  with banding of varices Latex Allergy ;  Surgeon: Guru Jose Kelly MD;  [facility]: [facility]     ESOPHAGOSCOPY, GASTROSCOPY, DUODENOSCOPY (EGD), COMBINED N/A 4/16/2018    Procedure: COMBINED ESOPHAGOSCOPY, GASTROSCOPY, DUODENOSCOPY (EGD);  Upper Endoscopy  with esophageal varices banding; Latex Allergy ;  Surgeon: Guru Jose Kelly MD;  [facility]: [facility]     ESOPHAGOSCOPY, GASTROSCOPY, DUODENOSCOPY (EGD), COMBINED N/A 5/30/2018    Procedure: COMBINED ESOPHAGOSCOPY, GASTROSCOPY, DUODENOSCOPY (EGD);   upper endoscopy with banding of esophageal varices. *latex Allergy*;  Surgeon: Guru Jose Kelly MD;  Location: [facility]     OPTICAL TRACKING SYSTEM CRANIOTOMY, EXCISE TUMOR, COMBINED  6/6/2013    Procedure: COMBINED OPTICAL TRACKING SYSTEM CRANIOTOMY, EXCISE TUMOR;  Left Stealth Guided Craniotomy , Tumor Resection ;  Surgeon: J Carlos Aranda MD;  Location: [facility]     ORTHOPEDIC SURGERY      hand surgery- right     SIGMOIDOSCOPY FLEXIBLE N/A 11/24/2015    Procedure: SIGMOIDOSCOPY FLEXIBLE;  Surgeon: Rocael Rizvi MD;  Location: [facility]     SIGMOIDOSCOPY FLEXIBLE N/A 1/19/2018    Procedure: SIGMOIDOSCOPY FLEXIBLE;;  Surgeon: Guru Jose Kelly MD;  [facility]     SIGMOIDOSCOPY FLEXIBLE N/A 10/1/2018    Procedure: SIGMOIDOSCOPY FLEXIBLE;;  Surgeon: Guru Jose Kelly MD;  Location: [facility]       Family History   Problem Relation Age of Onset     Adopted: Yes     Medical History Unknown Mother      Cirrhosis Father      Medical History Unknown Brother      Medical History Unknown Brother      Medical History Unknown Brother        Social History   Substance Use Topics     Smoking status: Never Smoker     Smokeless tobacco: Never Used     Alcohol use No      Comment: Quit 01/2014       Current Facility-Administered Medications   Medication     calcium carbonate 500 mg-vitamin D 200 units (OSCAL with D;OYSTER SHELL CALCIUM) per tablet 1 tablet     carBAMazepine (TEGretol) tablet 200 mg     cefTRIAXone (ROCEPHIN) 2 g vial to attach to  ml bag for ADULTS or NS 50 ml bag for PEDS     ciclopirox (LOPROX) 0.77 % cream     cyanocobalamin (vitamin  B-12) tablet 1,000 mcg     Daily 2 GRAM acetaminophen limit, unless fulminent liver failure or transaminases greater than or equal to 300 - 400, then none     ferrous sulfate (IRON) tablet 325 mg     folic acid (FOLVITE) tablet 1 mg     furosemide (LASIX) tablet 20 mg     gabapentin (NEURONTIN) capsule 100 mg      "lactulose (CHRONULAC) solution 20 g     lactulose (CHRONULAC) solution 20 g    Or     lactulose (CHRONULAC) solution for enema prep 100 g     levothyroxine (SYNTHROID/LEVOTHROID) tablet 88 mcg     lidocaine (LMX4) cream     melatonin tablet 1 mg     mirtazapine (REMERON) tablet 45 mg     multivitamin, therapeutic with minerals (THERA-VIT-M) tablet 1 tablet     naloxone (NARCAN) injection 0.1-0.4 mg     omeprazole (priLOSEC) CR capsule 40 mg     potassium chloride (KLOR-CON) Packet 20-40 mEq     potassium chloride 10 mEq in 100 mL intermittent infusion with 10 mg lidocaine     potassium chloride 10 mEq in 100 mL sterile water intermittent infusion (premix)     potassium chloride 20 mEq in 50 mL intermittent infusion     potassium chloride SA (K-DUR/KLOR-CON M) CR tablet 20-40 mEq     pravastatin (PRAVACHOL) tablet 80 mg     rifaximin (XIFAXAN) tablet 550 mg     spironolactone (ALDACTONE) tablet 50 mg     thiamine tablet 100 mg        Allergies   Allergen Reactions     Latex Itching and Rash     No Clinical Screening - See Comments      Coban and Surgilast cause itching     Tegaderm Transparent Dressing (Informational Only)        Review of Systems   Constitutional: Positive for fever (subjective).   Cardiovascular: Positive for leg swelling.   Gastrointestinal: Negative for blood in stool.   Neurological: Positive for weakness.   Psychiatric/Behavioral: Positive for confusion.   All other systems reviewed and are negative.      Physical Exam   BP: (!) 135/94  Pulse: 78  Heart Rate: 78  Temp: 98.8  F (37.1  C)  Resp: 17  Height: 177.8 cm (5' 10\")  Weight: 77.1 kg (170 lb)  SpO2: 98 %      Physical Exam   Constitutional: He is oriented to person, place, and time. Vital signs are normal. He appears well-developed. He appears listless. He appears cachectic.  Non-toxic appearance. He appears ill. No distress.   Patient appears washed out and somewhat listless.  He does answer questions but seems confused.  He is " protecting his airway without difficulty.   HENT:   Head: Normocephalic and atraumatic.   Mouth/Throat: Oropharynx is clear and moist. No oropharyngeal exudate.   Temporal wasting noted.    Eyes: Conjunctivae and EOM are normal. Pupils are equal, round, and reactive to light. No scleral icterus.   Neck: Normal range of motion. Neck supple. No JVD present. No tracheal deviation present. No thyromegaly present.   Cardiovascular: Normal rate, regular rhythm, normal heart sounds and intact distal pulses.  Exam reveals no gallop and no friction rub.    No murmur heard.  Pulmonary/Chest: Effort normal and breath sounds normal. No respiratory distress.   Abdominal: Soft. Bowel sounds are normal. He exhibits distension and ascites. He exhibits no mass. There is no tenderness. There is no rigidity, no rebound and no guarding.   Musculoskeletal: Normal range of motion. He exhibits no edema or tenderness.   Lymphadenopathy:     He has no cervical adenopathy.   Neurological: He is oriented to person, place, and time. He has normal strength. He appears listless. He displays tremor ( mild asterixixs noted). No cranial nerve deficit or sensory deficit.   Skin: Skin is warm and dry. No rash noted. No erythema. No pallor.   Psychiatric: He has a normal mood and affect. His behavior is normal.   Nursing note and vitals reviewed.      ED Course   5:14 PM  The patient was seen and examined by Reilly Love MD in Room 19.     ED Course     Procedures        Labs Ordered and Resulted from Time of ED Arrival Up to the Time of Departure from the ED   CBC WITH PLATELETS DIFFERENTIAL - Abnormal; Notable for the following:        Result Value    WBC 2.7 (*)     RBC Count 3.43 (*)     Hemoglobin 11.1 (*)     Hematocrit 33.0 (*)     RDW 15.7 (*)     Platelet Count 48 (*)     Absolute Lymphocytes 0.3 (*)     All other components within normal limits   COMPREHENSIVE METABOLIC PANEL - Abnormal; Notable for the following:     Potassium 3.2 (*)      Glucose 168 (*)     Calcium 8.2 (*)     Bilirubin Total 1.6 (*)     Albumin 2.8 (*)     Protein Total 6.4 (*)     Alkaline Phosphatase 339 (*)     All other components within normal limits   ROUTINE UA WITH MICROSCOPIC - Abnormal; Notable for the following:     Protein Albumin Urine 10 (*)     Mucous Urine Present (*)     Calcium Oxalate Few (*)     All other components within normal limits   INR - Abnormal; Notable for the following:     INR 1.51 (*)     All other components within normal limits   PARTIAL THROMBOPLASTIN TIME - Abnormal; Notable for the following:     PTT 42 (*)     All other components within normal limits   AMMONIA - Abnormal; Notable for the following:     Ammonia 111 (*)     All other components within normal limits   ISTAT  GASES LACTATE CALI POCT - Abnormal; Notable for the following:     PCO2 Venous 29 (*)     Bicarbonate Venous 18 (*)     All other components within normal limits   PULSE OXIMETRY NURSING   CARDIAC CONTINUOUS MONITORING   MEASURE URINE OUTPUT   PATIENT CARE ORDER   ISTAT CG4 GASES LACTATE CALI NURSING POCT   ALBUMIN FLUID   PROTEIN FLUID   GLUCOSE FLUID     XR Chest Port 1 View   Final Result   Impression: No acute airspace opacity.      I have personally reviewed the examination and initial interpretation   and I agree with the findings.      ELIAS RABAGO MD               Assessments & Plan (with Medical Decision Making)     This patient presented to the emergency department with confusion, intermittent fevers, and recent history of E. coli bacteremia.  Is not hypotensive, is currently afebrile, does not have a significant alteration of white blood cell count from baseline, and does not have an elevated lactate all decreasing suspicion for severe sepsis or septic shock.  Ammonia level is elevated above patient's reported baseline and symptoms could be consistent with hepatic encephalopathy.  Lactulose was given orally.  There is no clear clinical source of infection at  this time, but patient will have diagnostic paracentesis done by the admitting service for further evaluation.  No reported blood in stool or black or tarry stools or significant alteration of hemoglobin from baseline either.  Given patient's change in mental status, elevated ammonia levels and significant comorbidities, he will be admitted to the hospital for further treatment and workup of his hepatic encephalopathy.  I did speak with the admitting service and he was seen by them in the emergency department.  He was transferred up to his inpatient unit in good condition.    I have reviewed the nursing notes.    I have reviewed the findings, diagnosis, plan and need for follow up with the patient.    Current Discharge Medication List          Final diagnoses:   Hepatic encephalopathy (H)     IJ Carlos, am serving as a trained medical scribe to document services personally performed by Reilly Love MD, based on the provider's statements to me.   Reilly THOMAS MD, was physically present and have reviewed and verified the accuracy of this note documented by J Carlos Ramirez.    11/1/2018   [facility], [place], EMERGENCY DEPARTMENT     Armando Love MD  11/03/18 0800

## 2018-11-01 NOTE — TELEPHONE ENCOUNTER
Spoke with home care nurse, states that pt gained 4 pounds in 4 days, fell yesterday, lung sounds diminished, pt is confused today, having hard time to talk, -349, declined overall health. I advised to send him to ED today for further evaluation and she agreed. She will call Faby, mother in law.

## 2018-11-01 NOTE — IP AVS SNAPSHOT
[address] [address]    Phone:  [phone]                                       After Visit Summary   11/1/2018    Mono Varghese    MRN: [id number]           After Visit Summary Signature Page     I have received my discharge instructions, and my questions have been answered. I have discussed any challenges I see with this plan with the nurse or doctor.    ..........................................................................................................................................  Patient/Patient Representative Signature      ..........................................................................................................................................  Patient Representative Print Name and Relationship to Patient    ..................................................               ................................................  Date                                   Time    ..........................................................................................................................................  Reviewed by Signature/Title    ...................................................              ..............................................  Date                                               Time          22EPIC Rev 08/18

## 2018-11-01 NOTE — PROGRESS NOTES
SUBJECTIVE/OBJECTIVE:                Mono Varghese is a 48 year old male called for a transitions of care visit.  He was discharged from [facility] on 10/24 for encephalopathy and severe sepsis due to E. Coli bacteremia of unclear source. Spoke with wife Yisel, consent to communicate on file.     Chief Complaint: Have questions out to Dr. Louis - but no additional questions today regarding his medications. Don't think he is doing well and are awaiting next steps. They are awaiting a return call from the clinic today as he may need to go into PCP or the ER.     Allergies/ADRs: Reviewed in Epic  Tobacco: No tobacco use   Alcohol: not currently using    Medication Adherence/Access:  Patient has assistance with medication administration: home care and family member. They have RN/PT/OT/SLP/HA services    Encephalopathy/Severe Sepsis:   Completed Augmentin 875-125 mg BID  x 4 days (7 days antibiotic per chart review)  Lactulose 30 mLs BID (producing 5-6 stools/day, goal of 3-4 soft stools per day)  Xifaxan 550 mg BID  Thiamine 100 mg daily  Furosemide 20 mg daily  Spironolactone 50 mg daily  Folic acid 1 mg daily (new)  B12 1000 mcg daily  Ferrous sulfate 325 mg BID  No fever, but wife reports he is cold but he feels warm. He has adequate clothes on. Temperature of 98.1F today. Home health nurse has been trying to call primary care since Tuesday, but they are unsure of next steps, feel he needs to be seen. Wife reports his has been getting progressively worse since stopping the antibiotic. They were under the impression that he would be getting 14 days of antibiotics, however, the chart notes indicate the [place]um[facility] would be completion of a 7 day course. He is not able to use words today, does more grunting per her report. Also reports fall on Saturday.     Extra Movements: At the end of our call today, Yisel does ask what medications may be making him have Parkinson's like movements. She reports hand/finger  movements which are repetitive. Additionally, he will move his mouth without being aware of it. These were listed during his hospitalization 9/14 of this year.    ASSESSMENT:                 Current medications were reviewed today with wife Yisel. Focused on medication reconciliation, as they are awaiting return call from the clinic.    Medication Adherence: excellent, no issues identified    Encephalopathy/Severe Sepsis: Needs improvement. Patient seems to be worsening based on reports/chart notes. Completed antibiotics as written on discharge summary, as above, multiple notes suggest 7 day course of antibiotics. Patient would benefit from provider follow-up for assessment given concerns. Will connect with primary care team to ensure someone reaches out to them today with plan to discuss seeing Dr. Louis in immediate future versus emergency care.     Extra Movements: Needs improvement. Reviewed medications, this is most often a side effects of anti-psychotic medications, however, does not appear patient is on any currently. Tremor is reported (somewhat infrequently) with carbamazepine. Ataxia has also been reported with gabapentin, however, he is on a very small dose. This is most likely related to his ammonia levels of which, asterixis has been noted multiple times historically.    PLAN:                1. Ania to connect with RN from PCP regarding f/up  Connected Dr. Louis's RN who called the patient's wife and directed them to seek attention at the ER given declining condition. Based on conversation, it sounded like they were agreeable to this.    2. Ania to review medications of side effect of concern (repetitive movements) and will send information in [employer] (this was requested at the end of our call today)     I spent 20 minutes with this patient today. I offer these suggestions for consideration by Dr. Louis. A copy of the visit note was provided to the patient's primary care  provider.    Will follow up with timing based on outcome of ER visit.    The patient was sent via [facility] a summary of these recommendations as an after visit summary.    Ania Sharp PharmD   [facility] Pharmacist   Adult Rheumatology and IBD  Phone: [phone]

## 2018-11-01 NOTE — ED NOTES
Bed: ED19  Expected date:   Expected time:   Means of arrival: Ambulance  Comments:  48 M, hx of liver failure and brain CA. Pt c/o AMS, c-diff positive.

## 2018-11-01 NOTE — TELEPHONE ENCOUNTER
Left message for Edith to call clinic back to touch base on how patient is doing and discuss what antibiotic it was that the patient was sent home on. Tiffanie Lentz LPN 11/1/2018 3:01 PM

## 2018-11-01 NOTE — IP AVS SNAPSHOT
MRN:[id number]                      After Visit Summary   11/1/2018    Mono Varghese    MRN: [id number]           Thank you!     Thank you for choosing [facility] for your care. Our goal is always to provide you with excellent care. Hearing back from our patients is one way we can continue to improve our services. Please take a few minutes to complete the written survey that you may receive in the mail after you visit with us. Thank you!        Patient Information     Date Of Birth          1970        Designated Caregiver       Most Recent Value    Caregiver    Will someone help with your care after discharge? yes    Name of designated caregiver Yisel    Phone number of caregiver [phone]    Caregiver address [address] [address] [facility], [address]      About your hospital stay     You were admitted on:  November 1, 2018 You last received care in the:  [address] [place] [address]    You were discharged on:  November 8, 2018        Reason for your hospital stay       You were hospitalized for altered mental status.                  Who to Call     For medical emergencies, please call 911.  For non-urgent questions about your medical care, please call your primary care provider or clinic, [phone]          Attending Provider     Provider Specialty    Armando Love MD Emergency Medicine    Paras Hurtado MD Internal Medicine    Uriel Jqauez MD Internal Medicine    Kenney Florez DO Internal Medicine       Primary Care Provider Office Phone # Fax #    [place] [phone]      After Care Instructions     Activity       Your activity upon discharge: activity as tolerated            Diet       Follow this diet upon discharge: Orders Placed This Encounter      Fluid restriction 1800 ML FLUID      2 Gram Sodium Diet                  Follow-up Appointments     Adult [facility] Follow-up and recommended labs and tests       - Follow up with  primary care provider, [place], within 7 days for hospital follow- up.  The following labs/tests are recommended: CMP, CBC w/ platelets in 1 week.    - Repeat blood cultures 11/23 (1 week after finishing antibiotics) and port cultures to look for E.coli that may have seeded port.   Follow up with ID only if blood cultures return positive.     Appointments on [place] and/or [facility] (with [facility] or [facility] provider or service). Call [phone] if you haven't heard regarding these appointments within 7 days of discharge.                  Your next 10 appointments already scheduled     Nov 09, 2018 10:20 AM CST   (Arrive by 10:05 AM)   Return Visit with [place] MD   [facility] ([facility])    [address] [place] [address]  4th [facility] [id number] [phone]            Nov 21, 2018 10:45 AM CST   MR ABDOMEN W/O & W CONTRAST with [address] [facility] [place] [facility] ([facility])    [address] [place] [id number] [phone]           How do I prepare for my exam? (Food and drink instructions) Do not eat or drink for 6 hours prior to exam. **If you will be receiving sedation or general anesthesia, please see special notes below.**  How do I prepare for my exam? (Other instructions) Take your medicines as usual, unless your doctor tells you not to. You may or may not receive IV contrast for this exam pending the discretion of the Radiologist.  **If you will be receiving sedation or general anesthesia, please see special notes below.**  What should I wear: The MRI machine uses a strong magnet. Please wear clothes without metal (snaps, zippers). A sweatsuit works well, or we may give you a hospital gown. Please remove any body piercings and hair extensions before you arrive. You will also remove watches, jewelry, hairpins, wallets, dentures, partial dental plates and hearing aids. You may  wear contact lenses, and you may be able to wear your rings. We have a safe place to keep your personal items, but it is safer to leave them at home.  How long does the exam take: Most tests take 30 to 60 minutes.  HOWEVER, IF YOUR DOCTOR PRESCRIBES ANESTHESIA please plan on spending four to five hours in the recovery room.  What should I bring: Bring a list of your current medicines to your exam (including vitamins, minerals and over-the-counter drugs). Also bring the results of similar scans you may have had.  Do I need a : **If you will be receiving sedation or general anesthesia, please see special notes below.**  What should I do after the exam: No Restrictions, You may resume normal activities.  What is this test: MRI (magnetic resonance imaging) uses a strong magnet and radio waves to look inside the body. An MRA (magnetic resonance angiogram) does the same thing, but it lets us look at your blood vessels. A computer turns the radio waves into pictures showing cross sections of the body, much like slices of bread. This helps us see any problems more clearly. You may receive fluid (called  contrast ) before or during your scan. The fluid helps us see the pictures better. We give the fluid through an IV (small needle in your arm).  Who should I call with questions: If you have any questions, please contact your Imaging Department exam site. Directions, parking instructions, and other information is available on our website, [email or web address].            Dec 03, 2018   Procedure with Guru Jose Kelly MD   [facility] [place], Same Day Surgery (--)    [address]  [facility] [address]   [phone]            Jan 02, 2019  1:00 PM CST   Adult Med Follow UP with Leroy Alcaraz MD   Psychiatry Clinic ([facility])    [address]  [address] [address] [phone]            Feb 13, 2019  9:00 AM CST   Lab with [facility]  ([facility])    [address]  1st Floor  [facility] [address]   [phone]            Feb 13, 2019 10:00 AM CST   (Arrive by 9:45 AM)   Return General Liver with Beatriz Tanner MD   [facility] ([facility])    [address]  [address]  [facility] [address]   [phone]            Apr 26, 2019 10:00 AM CDT   MR BRAIN W/O & W CONTRAST with UCMR1   [facility] ([facility])    [address]  1st Floor  [facility] [address]   [phone]           How do I prepare for my exam? (Food and drink instructions) **If you will be receiving sedation or general anesthesia, please see special notes below.**  How do I prepare for my exam? (Other instructions) Take your medicines as usual, unless your doctor tells you not to. You may or may not receive intravenous (IV) contrast for this exam pending the discretion of the Radiologist.  You do not need to do anything special to prepare.  **If you will be receiving sedation or general anesthesia, please see special notes below.**  What should I wear: The MRI machine uses a strong magnet. Please wear clothes without metal (snaps, zippers). A sweatsuit works well, or we may give you a hospital gown. Please remove any body piercings and hair extensions before you arrive. You will also remove watches, jewelry, hairpins, wallets, dentures, partial dental plates and hearing aids. You may wear contact lenses, and you may be able to wear your rings. We have a safe place to keep your personal items, but it is safer to leave them at home.  How long does the exam take: Most tests take 30 to 60 minutes.  HOWEVER, IF YOUR DOCTOR PRESCRIBES ANESTHESIA please plan on spending four to five hours in the recovery room.  What should I bring:  Bring a list of your current medicines to your exam (including vitamins, minerals and over-the-counter drugs).   Do I need a :  **If you will be receiving sedation or general anesthesia, please see special notes below.**  What should I do after the exam: No Restrictions, You may resume normal activities.  What is this test: MRI (magnetic resonance imaging) uses a strong magnet and radio waves to look inside the body. An MRA (magnetic resonance angiogram) does the same thing, but it lets us look at your blood vessels. A computer turns the radio waves into pictures showing cross sections of the body, much like slices of bread. This helps us see any problems more clearly. You may receive fluid (called  contrast ) before or during your scan. The fluid helps us see the pictures better. We give the fluid through an IV (small needle in your arm).  Who should I call with questions:  Please call the Imaging Department at your exam site with any questions. Directions, parking instructions, and other information is available on our website, [email or web address].  How do I prepare if I m having sedation or anesthesia? **IMPORTANT** THE INSTRUCTIONS BELOW ARE ONLY FOR THOSE PATIENTS WHO HAVE BEEN TOLD THEY WILL RECEIVE SEDATION OR GENERAL ANESTHESIA DURING THEIR MRI PROCEDURE:  IF YOU WILL RECEIVE SEDATION (take medicine to help you relax during your exam): You must get the medicine from your doctor before you arrive. Bring the medicine to the exam. Do not take it at home. Arrive one hour early. Bring someone who can take you home after the test. Your medicine will make you sleepy. After the exam, you may not drive, take a bus or take a taxi by yourself. No eating 8 hours before your exam. You may have clear liquids up until 4 hours before your exam. (Clear liquids include water, clear tea, black coffee and fruit juice without pulp.)  IF YOU WILL RECEIVE ANESTHESIA (be asleep for your exam): Arrive 1 1/2 hours early. Bring someone who can take you home after the test. You may not drive, take a bus or take a taxi by yourself. No  eating 8 hours before your exam. You may have clear liquids up until 4 hours before your exam. (Clear liquids include water, clear tea, black coffee and fruit juice without pulp.)            May 03, 2019 10:45 AM CDT   (Arrive by 10:30 AM)   Return Visit with Lauro Shaw MD   [facility] ([facility])    [address] [place] [address]  [address] [place] [address] [phone]              Additional Services     Home Care OT Referral for Hospital Discharge       [facility] [phone]  Fax [phone]    OT to eval and treat    Your provider has ordered home care - occupational therapy. If you have not been contacted within 2 days of your discharge please call the department phone number listed on the top of this document.            Home Care PT Referral for Hospital Discharge       [facility] [phone]  Fax [phone]    PT to eval and treat    Your provider has ordered home care - physical therapy. If you have not been contacted within 2 days of your discharge please call the department phone number listed on the top of this document.            Home Care SLP Referral for Hospital Discharge       [facility] [phone]  Fax [phone]    SLP to eval and treat    Your provider has ordered home care - speech therapy. If you have not been contacted within 2 days of your discharge please call the department phone number listed on the top of this document.            Home care nursing referral       [facility] [phone]  Fax [phone]    RN skilled nursing visit. RN to assess vital signs and weight, respiratory and cardiac status, pain level and activity tolerance, hydration, nutrition and bowel status and home safety.  RN to teach medication management.  Home health aide to assist with ADL's    Your provider has ordered home care nursing services. If you have not been contacted within 2 days of your discharge please  call the inpatient department phone number at [phone] .            Home infusion referral       Your provider has referred you to: [facility]: [facility] [phone]   [email or web address]    Local Address (if different from home address): N/A    Anticipated Length of Therapy: per MD order    Home Infusion Pharmacist to adjust therapy based on labs and clinical assessments: Yes    Labs:  May draw labs from Venous Catheter: Yes  Home Infusion Pharmacist to order labs based on therapy type and clinical assessments: Yes  Call/Fax Lab Results to: [facility]  Appointments:   [phone]       Agency Staff to assess nursing needs for Infusion Therapy.    Access Device Management:  IV Access Type: Port-a-Cath  Flush with Heparin and Normal Saline IVP PRN and routine site care (per agency protocol) to maintain access device? Yes            Medication Therapy Management Referral       MTM referral reason            Patient has Lactulose or Rifaxamin as a PTA Med or a Discharge medication      Patient had a hospital or ED visit in last 6 months and has more than 10   PTA or Discharge medications    Patient has 5 PTA or Discharge Medications AND one of the following   diagnoses: DM,HF,COPD,AMI DX,PULM HTN       This service is designed to help you get the most from your medications.  A specially trained pharmacist will work closely with you and your doctors  to solve any problems related to your medications and to help you get the   best results from taking them.      The Medication Therapy Management staff will call you to schedule an appointment.                  Future tests that were ordered for you     Blood culture       X 3 sites (two peripheral cultures, one right port culture)            CBC with platelets differential       Last Lab Result: Hemoglobin (g/dL)       Date                     Value                 11/06/2018                "10.2 (L)         ----------            Comprehensive metabolic panel                 Pending Results     Date and Time Order Name Status Description    11/6/2018 1536 BLOOD CULTURE Preliminary     11/1/2018 1711 T4 free In process             Statement of Approval     Ordered          11/08/18 0951  I have reviewed and agree with all the recommendations and orders detailed in this document.  EFFECTIVE NOW     Approved and electronically signed by:  Daniel Henson MD             Admission Information     Date & Time Provider Department Dept. Phone    11/1/2018 Kenney Florez, DO [address] [facility] [phone]      Your Vitals Were     Blood Pressure Pulse Temperature Respirations Height Weight    122/83 (BP Location: Left arm) 76 97.3  F (36.3  C) (Oral) 18 1.778 m (5' 10\") 82.8 kg (182 lb 8 oz)    Pulse Oximetry BMI (Body Mass Index)                96% 26.19 kg/m2          [employer] Information     [employer] gives you secure access to your electronic health record. If you see a primary care provider, you can also send messages to your care team and make appointments. If you have questions, please call your primary care clinic.  If you do not have a primary care provider, please call [phone] and they will assist you.        Care EveryWhere ID     This is your Care EveryWhere ID. This could be used by other organizations to access your [facility] medical records  [id number]        Equal Access to Services     SAMUEL FAIR : Hadii kevin Thomas, olesya martinez, qaybta josue palomino. So [facility] [phone].    ATENCIÓN: Si habla español, tiene a chiang disposición servicios gratuitos de asistencia lingüística. Llame al [phone].    We comply with applicable federal civil rights laws and [place] laws. We do not discriminate on the basis of race, color, national origin, age, disability, sex, sexual orientation, or gender identity.             "   Review of your medicines      START taking        Dose / Directions    cefTRIAXone 2 GM vial   Commonly known as:  ROCEPHIN   Indication:  Infection with Dysregulated Inflammatory Response, ecoli bacteremia   Used for:  Bacteremia        Dose:  2 g   Inject 2 g into the vein every 24 hours for 9 days   Quantity:  180 mL   Refills:  0       miconazole 2 % powder   Commonly known as:  MICATIN; MICRO GUARD   Used for:  Dermatitis        Apply topically 2 times daily To groin as needed   Quantity:  43 g   Refills:  1         CONTINUE these medicines which may have CHANGED, or have new prescriptions. If we are uncertain of the size of tablets/capsules you have at home, strength may be listed as something that might have changed.        Dose / Directions    ciclopirox 0.77 % cream   Commonly known as:  LOPROX   This may have changed:    - when to take this  - reasons to take this  - additional instructions   Used for:  Tinea pedis of both feet        Apply topically 2 times daily To feet and toenails.   Quantity:  90 g   Refills:  4       hydrOXYzine 25 MG tablet   Commonly known as:  ATARAX   This may have changed:    - when to take this  - reasons to take this   Used for:  Itching, Anxiety        Dose:  25 mg   Take 1 tablet (25 mg) by mouth 2 times daily   Quantity:  180 tablet   Refills:  1       lactulose 10 GM/15ML solution   Commonly known as:  CHRONULAC   This may have changed:    - how much to take  - when to take this   Used for:  Hepatic encephalopathy (H)        Dose:  20 g   Take 30 mLs (20 g) by mouth 3 times daily   Quantity:  946 mL   Refills:  3         CONTINUE these medicines which have NOT CHANGED        Dose / Directions    blood glucose monitoring test strip   Commonly known as:  ONETOUCH ULTRA   Used for:  Diabetes mellitus, type 2 (H)        Use to test blood sugars 4 times daily as needed or as directed.   Quantity:  400 each   Refills:  1       Calcium Carb-Cholecalciferol 500-400 MG-UNIT Tabs    Commonly known as:  calcium 500 +D   Used for:  Malnutrition (H)        Dose:  500 mg   Take 500 mg by mouth daily   Quantity:  90 tablet   Refills:  1       CANE, ANY MATERIAL   Used for:  Balance disorder        One cane   Quantity:  1 each   Refills:  0       carBAMazepine 200 MG tablet   Commonly known as:  TEGretol   Used for:  Partial symptomatic epilepsy with complex partial seizures, not intractable, without status epilepticus (H)        Dose:  200 mg   Take 1 tablet (200 mg) by mouth 2 times daily   Quantity:  120 tablet   Refills:  11       cyanocobalamin 1000 MCG Tabs        Dose:  1 tablet   Take 1 tablet by mouth daily   Refills:  0       ferrous sulfate 325 (65 Fe) MG tablet   Commonly known as:  IRON   Used for:  Other iron deficiency anemia        Dose:  1 tablet   Take 1 tablet (325 mg) by mouth 2 times daily   Quantity:  200 tablet   Refills:  3       folic acid 1 MG tablet   Commonly known as:  FOLVITE   Used for:  Protein-calorie malnutrition, unspecified severity (H)        Dose:  1 mg   Take 1 tablet (1 mg) by mouth daily   Quantity:  30 tablet   Refills:  0       furosemide 20 MG tablet   Commonly known as:  LASIX   Used for:  Other ascites        Dose:  20 mg   Take 1 tablet (20 mg) by mouth daily   Quantity:  30 tablet   Refills:  3       gabapentin 100 MG capsule   Commonly known as:  NEURONTIN   Used for:  Mild episode of recurrent major depressive disorder (H)        Dose:  100 mg   Take 1 capsule (100 mg) by mouth At Bedtime   Quantity:  30 capsule   Refills:  3       HYDROcodone-acetaminophen 5-325 MG per tablet   Commonly known as:  NORCO   Used for:  Brain tumor (H)        Dose:  2 tablet   Take 2 tablets by mouth every 6 hours as needed for moderate to severe pain   Quantity:  60 tablet   Refills:  0       levothyroxine 88 MCG tablet   Commonly known as:  SYNTHROID/LEVOTHROID   Used for:  Hypothyroidism, unspecified type        Dose:  88 mcg   Take 1 tablet (88 mcg) by mouth daily  "  Quantity:  90 tablet   Refills:  1       lidocaine 4 % Crea cream   Commonly known as:  LMX4   Used for:  Port catheter in place        Apply topically once as needed for mild pain   Quantity:  133 g   Refills:  1       methylphenidate 10 MG tablet   Commonly known as:  RITALIN   Used for:  Attention deficit hyperactivity disorder (ADHD), unspecified ADHD type        Dose:  10 mg   Take 1 tablet (10 mg) by mouth 2 times daily   Quantity:  60 tablet   Refills:  0       mirtazapine 45 MG tablet   Commonly known as:  REMERON   Used for:  Major depressive disorder with single episode, in partial remission (H)        Dose:  45 mg   Take 1 tablet (45 mg) by mouth At Bedtime   Quantity:  30 tablet   Refills:  2       multivitamin, therapeutic with minerals Tabs tablet        Dose:  1 tablet   Take 1 tablet by mouth 2 times daily   Refills:  0       omeprazole 20 MG CR capsule   Commonly known as:  priLOSEC   Used for:  Gastroesophageal reflux disease without esophagitis        Dose:  40 mg   Take 2 capsules (40 mg) by mouth 2 times daily   Quantity:  360 capsule   Refills:  3       ONETOUCH LANCETS Misc   Used for:  Type II or unspecified type diabetes mellitus without mention of complication, not stated as uncontrolled        by In Vitro route 4 times daily as needed   Quantity:  100 each   Refills:  prn       order for DME   Used for:  Falls frequently, At high risk for falls, Imbalance, Limited mobility        Equipment being ordered: manual wheelchair and cushion  [employer] light weight Sx5 18x18, 17.5 inch floor height and a 2 inch 18x18 inch cushion  length of need : lifetime    Patient's height : 5/10\", weight : 172 pounds   Quantity:  1 Device   Refills:  0       pravastatin 80 MG tablet   Commonly known as:  PRAVACHOL   Used for:  High cholesterol        Dose:  80 mg   Take 1 tablet (80 mg) by mouth daily   Quantity:  90 tablet   Refills:  3       spironolactone 25 MG tablet   Commonly known as:  ALDACTONE   Used " for:  Other ascites        Dose:  50 mg   Take 2 tablets (50 mg) by mouth daily   Quantity:  180 tablet   Refills:  1       thiamine 100 MG tablet   Used for:  Alcoholic cirrhosis of liver with ascites (H)        Dose:  100 mg   Take 1 tablet (100 mg) by mouth daily   Quantity:  100 tablet   Refills:  1       traZODone 50 MG tablet   Commonly known as:  DESYREL   Used for:  Primary insomnia        Dose:  50 mg   Take 1 tablet (50 mg) by mouth nightly as needed for sleep   Quantity:  30 tablet   Refills:  2       Vitamin D (Cholecalciferol) 1000 units Tabs        Dose:  1 tablet   Take 1 tablet by mouth daily   Refills:  0       XIFAXAN 550 MG Tabs tablet   Used for:  Hepatic encephalopathy (H)   Generic drug:  rifaximin        TAKE ONE TABLET BY MOUTH TWICE DAILY   Quantity:  60 tablet   Refills:  11            Where to get your medicines      These medications were sent to [facility] [address] [place] [id number] [address] [address]     Phone:  [phone]     miconazole 2 % powder         Some of these will need a paper prescription and others can be bought over the counter. Ask your nurse if you have questions.     Bring a paper prescription for each of these medications     cefTRIAXone 2 GM vial                Protect others around you: Learn how to safely use, store and throw away your medicines at www.disposemymeds.org.        ANTIBIOTIC INSTRUCTION     You've Been Prescribed an Antibiotic - Now What?  Your healthcare team thinks that you or your loved one might have an infection. Some infections can be treated with antibiotics, which are powerful, life-saving drugs. Like all medications, antibiotics have side effects and should only be used when necessary. There are some important things you should know about your antibiotic treatment.      Your healthcare team may run tests before you start taking an antibiotic.    Your team may take samples (e.g., from your blood, urine  or other areas) to run tests to look for bacteria. These test can be important to determine if you need an antibiotic at all and, if you do, which antibiotic will work best.      Within a few days, your healthcare team might change or even stop your antibiotic.    Your team may start you on an antibiotic while they are working to find out what is making you sick.    Your team might change your antibiotic because test results show that a different antibiotic would be better to treat your infection.    In some cases, once your team has more information, they learn that you do not need an antibiotic at all. They may find out that you don't have an infection, or that the antibiotic you're taking won't work against your infection. For example, an infection caused by a virus can't be treated with antibiotics. Staying on an antibiotic when you don't need it is more likely to be harmful than helpful.      You may experience side effects from your antibiotic.    Like all medications, antibiotics have side effects. Some of these can be serious.    Let you healthcare team know if you have any known allergies when you are admitted to the hospital.    One significant side effect of nearly all antibiotics is the risk of severe and sometimes deadly diarrhea caused by Clostridium difficile (C. Difficile). This occurs when a person takes antibiotics because some good germs are destroyed. Antibiotic use allows C. diificile to take over, putting patients at high risk for this serious infection.    As a patient or caregiver, it is important to understand your or your loved one's antibiotic treatment. It is especially important for caregivers to speak up when patients can't speak for themselves. Here are some important questions to ask your healthcare team.    What infection is this antibiotic treating and how do you know I have that infection?    What side effects might occur from this antibiotic?    How long will I need to take this  antibiotic?    Is it safe to take this antibiotic with other medications or supplements (e.g., vitamins) that I am taking?     Are there any special directions I need to know about taking this antibiotic? For example, should I take it with food?    How will I be monitored to know whether my infection is responding to the antibiotic?    What tests may help to make sure the right antibiotic is prescribed for me?      Information provided by:  www.cdc.gov/getsmart  U.S. Department of Health and Human Services  Centers for disease Control and Prevention  National Center for Emerging and Zoonotic Infectious Diseases  Division of Healthcare Quality Promotion             Medication List: This is a list of all your medications and when to take them. Check marks below indicate your daily home schedule. Keep this list as a reference.      Medications           Morning Afternoon Evening Bedtime As Needed    blood glucose monitoring test strip   Commonly known as:  ONETOUCH ULTRA   Use to test blood sugars 4 times daily as needed or as directed.                                Calcium Carb-Cholecalciferol 500-400 MG-UNIT Tabs   Commonly known as:  calcium 500 +D   Take 500 mg by mouth daily                                CANE, ANY MATERIAL   One cane                                carBAMazepine 200 MG tablet   Commonly known as:  TEGretol   Take 1 tablet (200 mg) by mouth 2 times daily   Last time this was given:  200 mg on 11/8/2018  9:09 AM                                cefTRIAXone 2 GM vial   Commonly known as:  ROCEPHIN   Inject 2 g into the vein every 24 hours for 9 days   Last time this was given:  2 g on 11/8/2018  2:58 AM                                ciclopirox 0.77 % cream   Commonly known as:  LOPROX   Apply topically 2 times daily To feet and toenails.   Last time this was given:  11/8/2018  9:18 AM                                cyanocobalamin 1000 MCG Tabs   Take 1 tablet by mouth daily   Last time this was  given:  1,000 mcg on 11/8/2018  9:10 AM                                ferrous sulfate 325 (65 Fe) MG tablet   Commonly known as:  IRON   Take 1 tablet (325 mg) by mouth 2 times daily   Last time this was given:  325 mg on 11/7/2018  8:54 PM                                folic acid 1 MG tablet   Commonly known as:  FOLVITE   Take 1 tablet (1 mg) by mouth daily   Last time this was given:  1 mg on 11/8/2018  9:10 AM                                furosemide 20 MG tablet   Commonly known as:  LASIX   Take 1 tablet (20 mg) by mouth daily   Last time this was given:  20 mg on 11/8/2018  9:10 AM                                gabapentin 100 MG capsule   Commonly known as:  NEURONTIN   Take 1 capsule (100 mg) by mouth At Bedtime   Last time this was given:  100 mg on 11/7/2018  9:03 PM                                HYDROcodone-acetaminophen 5-325 MG per tablet   Commonly known as:  NORCO   Take 2 tablets by mouth every 6 hours as needed for moderate to severe pain                                hydrOXYzine 25 MG tablet   Commonly known as:  ATARAX   Take 1 tablet (25 mg) by mouth 2 times daily                                lactulose 10 GM/15ML solution   Commonly known as:  CHRONULAC   Take 30 mLs (20 g) by mouth 3 times daily   Last time this was given:  20 g on 11/8/2018  9:11 AM                                levothyroxine 88 MCG tablet   Commonly known as:  SYNTHROID/LEVOTHROID   Take 1 tablet (88 mcg) by mouth daily   Last time this was given:  88 mcg on 11/8/2018  9:11 AM                                lidocaine 4 % Crea cream   Commonly known as:  LMX4   Apply topically once as needed for mild pain                                methylphenidate 10 MG tablet   Commonly known as:  RITALIN   Take 1 tablet (10 mg) by mouth 2 times daily                                miconazole 2 % powder   Commonly known as:  MICATIN; MICRO GUARD   Apply topically 2 times daily To groin as needed   Last time this was given:   "11/7/2018  8:54 PM                                mirtazapine 45 MG tablet   Commonly known as:  REMERON   Take 1 tablet (45 mg) by mouth At Bedtime   Last time this was given:  45 mg on 11/7/2018  9:03 PM                                multivitamin, therapeutic with minerals Tabs tablet   Take 1 tablet by mouth 2 times daily   Last time this was given:  1 tablet on 11/8/2018  9:10 AM                                omeprazole 20 MG CR capsule   Commonly known as:  priLOSEC   Take 2 capsules (40 mg) by mouth 2 times daily   Last time this was given:  40 mg on 11/8/2018  9:09 AM                                ONETOUCH LANCETS Misc   by In Vitro route 4 times daily as needed                                order for DME   Equipment being ordered: manual wheelchair and cushion  [employer] light weight Sx5 18x18, 17.5 inch floor height and a 2 inch 18x18 inch cushion  length of need : lifetime    Patient's height : 5/10\", weight : 172 pounds                                pravastatin 80 MG tablet   Commonly known as:  PRAVACHOL   Take 1 tablet (80 mg) by mouth daily   Last time this was given:  80 mg on 11/8/2018  9:10 AM                                spironolactone 25 MG tablet   Commonly known as:  ALDACTONE   Take 2 tablets (50 mg) by mouth daily   Last time this was given:  50 mg on 11/8/2018  9:11 AM                                thiamine 100 MG tablet   Take 1 tablet (100 mg) by mouth daily   Last time this was given:  100 mg on 11/8/2018  9:11 AM                                traZODone 50 MG tablet   Commonly known as:  DESYREL   Take 1 tablet (50 mg) by mouth nightly as needed for sleep                                Vitamin D (Cholecalciferol) 1000 units Tabs   Take 1 tablet by mouth daily                                XIFAXAN 550 MG Tabs tablet   TAKE ONE TABLET BY MOUTH TWICE DAILY   Last time this was given:  550 mg on 11/8/2018  9:09 AM   Generic drug:  rifaximin                                  "

## 2018-11-01 NOTE — LETTER
Transition Communication Hand-off for Care Transitions to Next Level of Care Provider    Name: Mono Varghese  : 1970  MRN #: [id number]  Primary Care Provider: King Louis     Primary Clinic: [address] [place] [id number]     Reason for Hospitalization:  Hepatic encephalopathy (H) [K72.90]  Alcoholic cirrhosis of liver with ascites (H) [K70.31]  Admit Date/Time: 2018  4:54 PM  Discharge Date: 18  Payor Source: Payor: MEDICARE / Plan: MEDICARE / Product Type: Medicare /     Reason for Communication Hand-off Referral: continuation of care. Fragility     Discharge Plan: home with family and [facility] care. On IV rocephin until  with [facility] home infusion       Concern for non-adherence with plan of care:   Y/N n  Discharge Needs Assessment:  Needs       Most Recent Value    Equipment Currently Used at Home wheelchair, manual, walker, standard, cane, straight, shower chair, grab bar    Transportation Available family or friend will provide    Home Care [facility] [phone], Fax: [phone]          Already enrolled in Tele-monitoring program and name of program:    Follow-up specialty is recommended: Yes    Follow-up plan:  Future Appointments  Date Time Provider Department Center   2018 6:00 AM [place], [facility]   2018 10:20 AM King Louis MD [facility]   2018 10:45 AM [address]   2019 1:00 PM Leroy Alcaraz MD [facility]PSY [facility]   2019 9:00 AM  [facility]   2019 10:00 AM Beatriz Tanner MD [facility]   2019 10:00 AM [address]   5/3/2019 10:45 AM Lauro Shaw MD [facility]       Any outstanding tests or procedures:        Referrals     Future Labs/Procedures    Home care nursing referral     Comments:    [facility] [phone]  Fax [phone]    RN skilled nursing visit. RN to assess vital signs and weight, respiratory and  cardiac status, pain level and activity tolerance, hydration, nutrition and bowel status and home safety.  RN to teach medication management.  Home health aide to assist with ADL's    Your provider has ordered home care nursing services. If you have not been contacted within 2 days of your discharge please call the inpatient department phone number at [phone] .    Home Care OT Referral for Hospital Discharge     Comments:    [facility] [phone]  Fax [phone]    OT to eval and treat    Your provider has ordered home care - occupational therapy. If you have not been contacted within 2 days of your discharge please call the department phone number listed on the top of this document.    Home Care PT Referral for Hospital Discharge     Comments:    [facility] [phone]  Fax [phone]    PT to eval and treat    Your provider has ordered home care - physical therapy. If you have not been contacted within 2 days of your discharge please call the department phone number listed on the top of this document.    Home Care SLP Referral for Hospital Discharge     Comments:    [facility] [phone]  Fax [phone]    SLP to eval and treat    Your provider has ordered home care - speech therapy. If you have not been contacted within 2 days of your discharge please call the department phone number listed on the top of this document.    Home infusion referral     Comments:    Your provider has referred you to: [facility]: [facility] [place] [phone]   [email or web address]    Local Address (if different from home address): N/A    Anticipated Length of Therapy: per MD order    Home Infusion Pharmacist to adjust therapy based on labs and clinical assessments: Yes    Labs:  May draw labs from Venous Catheter: Yes  Home Infusion Pharmacist to order labs based on therapy type and clinical assessments: Yes  Call/Fax Lab Results to: [facility]  Appointments:   [phone]       Agency Staff to assess nursing needs for Infusion Therapy.    Access Device Management:  IV Access Type: Port-a-Cath  Flush with Heparin and Normal Saline IVP PRN and routine site care (per agency protocol) to maintain access device? Yes    Medication Therapy Management Referral     Comments:    MTM referral reason            Patient has Lactulose or Rifaxamin as a PTA Med or a Discharge medication      Patient had a hospital or ED visit in last 6 months and has more than 10   PTA or Discharge medications    Patient has 5 PTA or Discharge Medications AND one of the following   diagnoses: DM,HF,COPD,AMI DX,PULM HTN       This service is designed to help you get the most from your medications.  A specially trained pharmacist will work closely with you and your doctors  to solve any problems related to your medications and to help you get the   best results from taking them.      The Medication Therapy Management staff will call you to schedule an appointment.            Key Recommendations:      Kyleigh Aburto    AVS/Discharge Summary is the source of truth; this is a helpful guide for improved communication of patient story

## 2018-11-01 NOTE — MR AVS SNAPSHOT
"              After Visit Summary   11/1/2018    Mono Varghese    MRN: [id number]           Patient Information     Date Of Birth          1970        Visit Information        Provider Department      11/1/2018 2:30 PM Ania Sharp, [facility] Rheumatology MTM        Today's Diagnoses     Hepatic encephalopathy (H)    -  1    Medication side effects          Care Instructions    Recommendations from today's MTM visit:                                                    MTM (medication therapy management) is a service provided by a clinical pharmacist designed to help you get the most of out of your medicines.   Today we reviewed what your medicines are for, how to know if they are working, that your medicines are safe and how to make your medicine regimen as easy as possible.     1. Ania will connect with primary care to ensure they contact you about next steps for Mono.    2. I reviewed Mono's medication list for major causes of the \"parkinsons like movements\" you described. This movement is most likely caused by medications in the antipsychotic class or anti-nausea class (which share a similar structure). I did not see that Mono is currently on any of these medications. Tremor and lack of muscle coordination has been reported with carbamazepine and gabapentin respectively, in very low amount. He is currently on a very small dose of gabapentin.     These movements may be related to his ammonia level. It is common for patients to have hand movements or tremor with elevated ammonia levels. Please let me know if you have questions about this.    Next MTM visit: will follow-up based on next steps per primary care    To schedule another MTM appointment, please call the clinic directly or you may call the MTM scheduling line at [phone] or toll-free at [phone].     My Clinical Pharmacist's contact information:                                                      It was a " pleasure talking with you today!  Please feel free to contact me with any questions or concerns you have.      Ania Sharp PharmD   MTM Pharmacist   Adult Rheumatology and IBD  Phone: [phone]    You may receive a survey about the MT services you received.  I would appreciate your feedback to help me serve you better in the future. Please fill it out and return it when you can. Your comments will be anonymous.                    Follow-ups after your visit        Your next 10 appointments already scheduled     Nov 09, 2018 10:20 AM CST   (Arrive by 10:05 AM)   Return Visit with King Louis MD   [facility] ([facility])    [address]  4th Floor  [facility] [place] [address] [phone]            Nov 21, 2018 10:45 AM CST   MR ABDOMEN W/O & W CONTRAST with [address] MRI ([facility])    [address]  1st Floor  [facility] [place] [id number] [phone]           How do I prepare for my exam? (Food and drink instructions) Do not eat or drink for 6 hours prior to exam. **If you will be receiving sedation or general anesthesia, please see special notes below.**  How do I prepare for my exam? (Other instructions) Take your medicines as usual, unless your doctor tells you not to. You may or may not receive IV contrast for this exam pending the discretion of the Radiologist.  **If you will be receiving sedation or general anesthesia, please see special notes below.**  What should I wear: The MRI machine uses a strong magnet. Please wear clothes without metal (snaps, zippers). A sweatsuit works well, or we may give you a hospital gown. Please remove any body piercings and hair extensions before you arrive. You will also remove watches, jewelry, hairpins, wallets, dentures, partial dental plates and hearing aids. You may wear contact lenses, and you may be able to wear your rings. We have a safe place to  keep your personal items, but it is safer to leave them at home.  How long does the exam take: Most tests take 30 to 60 minutes.  HOWEVER, IF YOUR DOCTOR PRESCRIBES ANESTHESIA please plan on spending four to five hours in the recovery room.  What should I bring: Bring a list of your current medicines to your exam (including vitamins, minerals and over-the-counter drugs). Also bring the results of similar scans you may have had.  Do I need a : **If you will be receiving sedation or general anesthesia, please see special notes below.**  What should I do after the exam: No Restrictions, You may resume normal activities.  What is this test: MRI (magnetic resonance imaging) uses a strong magnet and radio waves to look inside the body. An MRA (magnetic resonance angiogram) does the same thing, but it lets us look at your blood vessels. A computer turns the radio waves into pictures showing cross sections of the body, much like slices of bread. This helps us see any problems more clearly. You may receive fluid (called  contrast ) before or during your scan. The fluid helps us see the pictures better. We give the fluid through an IV (small needle in your arm).  Who should I call with questions: If you have any questions, please contact your Imaging Department exam site. Directions, parking instructions, and other information is available on our website, [email or web address].            Dec 03, 2018   Procedure with Guru Jose Kelly MD   [facility] [place], Same Day Surgery (--)    [address] [address]   [phone]            Jan 02, 2019  1:00 PM CST   Adult Med Follow UP with Leroy Alcaraz MD   Psychiatry Clinic ([facility])    [address]  [address] [address]   [phone]            Feb 13, 2019  9:00 AM CST   Lab with [facility] ([facility])    [address]  1st  Floor  [facility] [address]   [phone]            Feb 13, 2019 10:00 AM CST   (Arrive by 9:45 AM)   Return General Liver with Beatriz Tanner MD   [facility] Hepatology ([facility])    [address]  [address]  [facility] [address]   [phone]            Apr 26, 2019 10:00 AM CDT   MR BRAIN W/O & W CONTRAST with UCMR1   [facility] [place] [facility] ([facility])    [address]  1st Floor  [facility] [address]   [phone]           How do I prepare for my exam? (Food and drink instructions) **If you will be receiving sedation or general anesthesia, please see special notes below.**  How do I prepare for my exam? (Other instructions) Take your medicines as usual, unless your doctor tells you not to. You may or may not receive intravenous (IV) contrast for this exam pending the discretion of the Radiologist.  You do not need to do anything special to prepare.  **If you will be receiving sedation or general anesthesia, please see special notes below.**  What should I wear: The MRI machine uses a strong magnet. Please wear clothes without metal (snaps, zippers). A sweatsuit works well, or we may give you a hospital gown. Please remove any body piercings and hair extensions before you arrive. You will also remove watches, jewelry, hairpins, wallets, dentures, partial dental plates and hearing aids. You may wear contact lenses, and you may be able to wear your rings. We have a safe place to keep your personal items, but it is safer to leave them at home.  How long does the exam take: Most tests take 30 to 60 minutes.  HOWEVER, IF YOUR DOCTOR PRESCRIBES ANESTHESIA please plan on spending four to five hours in the recovery room.  What should I bring:  Bring a list of your current medicines to your exam (including vitamins, minerals and over-the-counter drugs).  Do I need a :  **If you will be receiving sedation or  general anesthesia, please see special notes below.**  What should I do after the exam: No Restrictions, You may resume normal activities.  What is this test: MRI (magnetic resonance imaging) uses a strong magnet and radio waves to look inside the body. An MRA (magnetic resonance angiogram) does the same thing, but it lets us look at your blood vessels. A computer turns the radio waves into pictures showing cross sections of the body, much like slices of bread. This helps us see any problems more clearly. You may receive fluid (called  contrast ) before or during your scan. The fluid helps us see the pictures better. We give the fluid through an IV (small needle in your arm).  Who should I call with questions:  Please call the Imaging Department at your exam site with any questions. Directions, parking instructions, and other information is available on our website, [email or web address].  How do I prepare if I m having sedation or anesthesia? **IMPORTANT** THE INSTRUCTIONS BELOW ARE ONLY FOR THOSE PATIENTS WHO HAVE BEEN TOLD THEY WILL RECEIVE SEDATION OR GENERAL ANESTHESIA DURING THEIR MRI PROCEDURE:  IF YOU WILL RECEIVE SEDATION (take medicine to help you relax during your exam): You must get the medicine from your doctor before you arrive. Bring the medicine to the exam. Do not take it at home. Arrive one hour early. Bring someone who can take you home after the test. Your medicine will make you sleepy. After the exam, you may not drive, take a bus or take a taxi by yourself. No eating 8 hours before your exam. You may have clear liquids up until 4 hours before your exam. (Clear liquids include water, clear tea, black coffee and fruit juice without pulp.)  IF YOU WILL RECEIVE ANESTHESIA (be asleep for your exam): Arrive 1 1/2 hours early. Bring someone who can take you home after the test. You may not drive, take a bus or take a taxi by yourself. No eating 8 hours before your exam. You may have clear liquids up  until 4 hours before your exam. (Clear liquids include water, clear tea, black coffee and fruit juice without pulp.)            May 03, 2019 10:45 AM CDT   (Arrive by 10:30 AM)   Return Visit with Lauro Shaw MD   [facility] ([facility])    [address]  [address]  [facility] [id number] [phone]              Future tests that were ordered for you today     Open Standing Orders        Priority Remaining Interval Expires Ordered    Comprehensive metabolic panel Routine 1/1 AM DRAW  11/2/2018    INR Routine 1/1 AM DRAW  11/2/2018    CBC with platelets Routine 1/1 AM DRAW  11/2/2018    Potassium Routine 100/100 CONDITIONAL (SPECIFY)  11/2/2018    Oxygen: Nasal cannula, Oxygen mask STAT 58440/39047 CONTINUOUS  11/1/2018            Who to contact     If you have questions or need follow up information about today's clinic visit or your schedule please contact [facility] RHEUMATOLOGY [facility] directly at [phone].  Normal or non-critical lab and imaging results will be communicated to you by [employer], letter or phone within 4 business days after the clinic has received the results. If you do not hear from us within 7 days, please contact the clinic through [employer] or phone. If you have a critical or abnormal lab result, we will notify you by phone as soon as possible.  Submit refill requests through [employer] or call your pharmacy and they will forward the refill request to us. Please allow 3 business days for your refill to be completed.          Additional Information About Your Visit        [employer] Information     [employer] gives you secure access to your electronic health record. If you see a primary care provider, you can also send messages to your care team and make appointments. If you have questions, please call your primary care clinic.  If you do not have a primary care provider, please call [phone] and they will assist you.        Care  EveryWhere ID     This is your Care EveryWhere ID. This could be used by other organizations to access your [place] medical records  [id number]         Blood Pressure from Last 3 Encounters:   11/02/18 124/86   10/24/18 101/71   10/15/18 131/88    Weight from Last 3 Encounters:   11/01/18 188 lb 4.4 oz (85.4 kg)   10/24/18 170 lb 12.8 oz (77.5 kg)   10/15/18 180 lb 5.4 oz (81.8 kg)              Today, you had the following     No orders found for display         Today's Medication Changes          These changes are accurate as of 11/1/18  4:54 PM.  If you have any questions, ask your nurse or doctor.               These medicines have changed or have updated prescriptions.        Dose/Directions    hydrOXYzine 25 MG tablet   Commonly known as:  ATARAX   This may have changed:  when to take this   Used for:  Itching, Anxiety        Dose:  25 mg   Take 1 tablet (25 mg) by mouth 2 times daily   Quantity:  180 tablet   Refills:  1       lactulose 10 GM/15ML solution   Commonly known as:  CHRONULAC   This may have changed:  when to take this   Used for:  Hepatic encephalopathy (H)        Dose:  20 g   Take 30 mLs (20 g) by mouth 3 times daily   Quantity:  946 mL   Refills:  3                Primary Care Provider Office Phone # Fax #    King Louis [phone]       [address] [address]        Equal Access to Services     SAMUEL FAIR AH: Hadii kevin ku hadasho Sojoali, waaxda luqadaha, qaybta kaalmada adeegyada, waxclementine sweta tamayo. So [facility] [phone].    ATENCIÓN: Si habla español, tiene a chiang disposición servicios gratuitos de asistencia lingüística. Llame al [phone].    We comply with applicable federal civil rights laws and [place] laws. We do not discriminate on the basis of race, color, national origin, age, disability, sex, sexual orientation, or gender identity.            Thank you!     Thank you for choosing [facility]  RHEUMATOLOGY MTM  for your care. Our goal is always to provide you with excellent care. Hearing back from our patients is one way we can continue to improve our services. Please take a few minutes to complete the written survey that you may receive in the mail after your visit with us. Thank you!             Your Updated Medication List - Protect others around you: Learn how to safely use, store and throw away your medicines at www.disposemymeds.org.          This list is accurate as of 11/1/18  4:54 PM.  Always use your most recent med list.                   Brand Name Dispense Instructions for use Diagnosis    blood glucose monitoring test strip    ONETOUCH ULTRA    400 each    Use to test blood sugars 4 times daily as needed or as directed.    Diabetes mellitus, type 2 (H)       Calcium Carb-Cholecalciferol 500-400 MG-UNIT Tabs    calcium 500 +D    90 tablet    Take 500 mg by mouth daily    Malnutrition (H)       CANE, ANY MATERIAL     1 each    One cane    Balance disorder       carBAMazepine 200 MG tablet    TEGretol    120 tablet    Take 1 tablet (200 mg) by mouth 2 times daily    Partial symptomatic epilepsy with complex partial seizures, not intractable, without status epilepticus (H)       ciclopirox 0.77 % cream    LOPROX    90 g    Apply topically 2 times daily To feet and toenails.    Tinea pedis of both feet       cyanocobalamin 1000 MCG Tabs      Take 1 tablet by mouth daily        ferrous sulfate 325 (65 Fe) MG tablet    IRON    200 tablet    Take 1 tablet (325 mg) by mouth 2 times daily    Other iron deficiency anemia       folic acid 1 MG tablet    FOLVITE    30 tablet    Take 1 tablet (1 mg) by mouth daily    Protein-calorie malnutrition, unspecified severity (H)       furosemide 20 MG tablet    LASIX    30 tablet    Take 1 tablet (20 mg) by mouth daily    Other ascites       gabapentin 100 MG capsule    NEURONTIN    30 capsule    Take 1 capsule (100 mg) by mouth At Bedtime    Mild episode of  "recurrent major depressive disorder (H)       HYDROcodone-acetaminophen 5-325 MG per tablet    NORCO    60 tablet    Take 2 tablets by mouth every 6 hours as needed for moderate to severe pain    Brain tumor (H)       hydrOXYzine 25 MG tablet    ATARAX    180 tablet    Take 1 tablet (25 mg) by mouth 2 times daily    Itching, Anxiety       lactulose 10 GM/15ML solution    CHRONULAC    946 mL    Take 30 mLs (20 g) by mouth 3 times daily    Hepatic encephalopathy (H)       levothyroxine 88 MCG tablet    SYNTHROID/LEVOTHROID    90 tablet    Take 1 tablet (88 mcg) by mouth daily    Hypothyroidism, unspecified type       lidocaine 4 % Crea cream    LMX4    133 g    Apply topically once as needed for mild pain    Port catheter in place       methylphenidate 10 MG tablet    RITALIN    60 tablet    Take 1 tablet (10 mg) by mouth 2 times daily    Attention deficit hyperactivity disorder (ADHD), unspecified ADHD type       mirtazapine 45 MG tablet    REMERON    30 tablet    Take 1 tablet (45 mg) by mouth At Bedtime    Major depressive disorder with single episode, in partial remission (H)       multivitamin, therapeutic with minerals Tabs tablet      Take 1 tablet by mouth 2 times daily        omeprazole 20 MG CR capsule    priLOSEC    360 capsule    Take 2 capsules (40 mg) by mouth 2 times daily    Gastroesophageal reflux disease without esophagitis       ONETOUCH LANCETS Misc     100 each    by In Vitro route 4 times daily as needed    Type II or unspecified type diabetes mellitus without mention of complication, not stated as uncontrolled       order for DME     1 Device    Equipment being ordered: manual wheelchair and cushion  [employer] light weight Sx5 18x18, 17.5 inch floor height and a 2 inch 18x18 inch cushion  length of need : lifetime    Patient's height : 5/10\", weight : 172 pounds    Falls frequently, At high risk for falls, Imbalance, Limited mobility       pravastatin 80 MG tablet    PRAVACHOL    90 tablet    " Take 1 tablet (80 mg) by mouth daily    High cholesterol       spironolactone 25 MG tablet    ALDACTONE    180 tablet    Take 2 tablets (50 mg) by mouth daily    Other ascites       thiamine 100 MG tablet     100 tablet    Take 1 tablet (100 mg) by mouth daily    Alcoholic cirrhosis of liver with ascites (H)       traZODone 50 MG tablet    DESYREL    30 tablet    Take 1 tablet (50 mg) by mouth nightly as needed for sleep    Primary insomnia       Vitamin D (Cholecalciferol) 1000 units Tabs      Take 1 tablet by mouth daily        XIFAXAN 550 MG Tabs tablet   Generic drug:  rifaximin     60 tablet    TAKE ONE TABLET BY MOUTH TWICE DAILY    Hepatic encephalopathy (H)

## 2018-11-01 NOTE — IP AVS SNAPSHOT
"    [address] [facility] [facility]: [phone]                                              INTERAGENCY TRANSFER FORM - PHYSICIAN ORDERS   2018                    Hospital Admission Date: 2018  SALO MADERA   : 1970  Sex: Male        Attending Provider: Kenney Florez DO     Allergies:  Latex, No Clinical Screening - See Comments, Tegaderm Transparent Dressing (Informational Only)    Infection:  None   Service:  INTERNAL MED    Ht:  1.778 m (5' 10\")   Wt:  82.8 kg (182 lb 8 oz)   Admission Wt:  77.1 kg (170 lb)    BMI:  26.19 kg/m 2   BSA:  2.02 m 2            Patient PCP Information     Provider PCP Type    King Louis MD General      ED Clinical Impression     Diagnosis Description Comment Added By Time Added    Hepatic encephalopathy (H) [K72.90] Hepatic encephalopathy (H) [K72.90]  Armando Love MD 2018  8:48 PM      Hospital Problems as of 2018              Priority Class Noted POA    Hepatic encephalopathy (H) Medium  10/18/2018 Yes      Non-Hospital Problems as of 2018              Priority Class Noted    Type 2 diabetes mellitus (H) Medium  10/3/2012    Moderate major depression (H) Medium  10/3/2012    LENA (obstructive sleep apnea)-moderate (AHI 16) Medium  2012    Oligoastrocytoma of parietal lobe (H) Medium  2013    ADHD (attention deficit hyperactivity disorder) Medium  2013    Financial difficulties Medium  2013    Thrombocytopenia (H) Medium  2013    Partial epilepsy with impairment of consciousness (H) Medium Chronic 2014    Cirrhosis of liver (H) Medium  2015    Pulmonary nodules Medium  2015    Myopic astigmatism of both eyes Medium  2015    Presbyopia Medium  2015    Ringing in ears, unspecified laterality Medium  2015    GIB (gastrointestinal bleeding) Medium  2015    Portal vein thrombosis Medium  2015    Chronic pain High  2016    Hyperlipidemia LDL goal <100 Medium  " 8/1/2016    Pancytopenia (H) Medium  8/1/2016    Hypothyroidism Low  8/1/2016    Hx of psychiatric care Medium  10/5/2016    Encephalopathy, hepatic (H) Medium  7/29/2017    GI bleed Medium  9/4/2018    Encephalopathy Medium  9/14/2018    Elevated INR Medium  9/27/2018      Code Status History     Date Active Date Inactive Code Status Order ID Comments User Context    11/6/2018  3:36 PM  DNR/DNI [id number]  Jenna Baeza MD Outpatient    11/2/2018 12:24 AM 11/6/2018  3:36 PM DNR/DNI [id number]  Lilian Dumont MD Inpatient    10/23/2018  6:00 PM 11/2/2018 12:24 AM DNR/DNI [id number]  Ruby Beyer MD Outpatient    10/20/2018  3:29 PM 10/23/2018  6:00 PM DNR/DNI [id number] Discussed with wife at bedside Yolis Cabral MD Inpatient    9/4/2018  6:06 AM 9/6/2018  9:32 PM DNR/DNI [id number]  Akua Acosta MD Inpatient    8/1/2017  1:14 PM 9/4/2018  6:06 AM DNR/DNI [id number]  Patrick Mcclellan MD Outpatient    7/29/2017  5:02 PM 8/1/2017  1:14 PM DNR/DNI [id number]  Lucina Damico MD Inpatient    1/1/2016 12:42 PM 7/29/2017  5:02 PM DNR/DNI [id number]  Kwesi Mcclain MD Outpatient    12/22/2015 12:41 PM 1/1/2016 12:42 PM DNR/DNI [id number]  Kwesi Mcclain MD Inpatient    11/23/2015  4:16 AM 12/22/2015 12:41 PM Full Code [id number]  Walter Mcgee MD Inpatient    9/9/2014  3:00 PM 11/23/2015  4:16 AM Full Code [id number]  Elizabet Guerra MD Outpatient    9/9/2014  1:33 AM 9/9/2014  3:00 PM Full Code [id number]  Mahad Bingham Inpatient    6/21/2013  8:40 AM 9/9/2014  1:33 AM Full Code [id number]  Dylan Woods MD Outpatient    6/11/2013 11:51 AM 6/21/2013  8:40 AM Full Code [id number]  Dylan Woods MD Inpatient    4/18/2013  5:54 PM 6/11/2013 11:51 AM Full Code [id number]  Mono Macias MD Outpatient    4/17/2013  5:39 PM 4/18/2013  5:54 PM Full Code [id number]  Mono Macias MD ED         Medication Review      START taking         Dose / Directions Comments    cefTRIAXone 2 GM vial   Commonly known as:  ROCEPHIN   Indication:  Infection with Dysregulated Inflammatory Response, ecoli bacteremia   Used for:  Bacteremia        Dose:  2 g   Inject 2 g into the vein every 24 hours for 9 days   Quantity:  180 mL   Refills:  0        miconazole 2 % powder   Commonly known as:  MICATIN; MICRO GUARD   Used for:  Dermatitis        Apply topically 2 times daily To groin as needed   Quantity:  43 g   Refills:  1          CONTINUE these medications which may have CHANGED, or have new prescriptions. If we are uncertain of the size of tablets/capsules you have at home, strength may be listed as something that might have changed.        Dose / Directions Comments    ciclopirox 0.77 % cream   Commonly known as:  LOPROX   This may have changed:    - when to take this  - reasons to take this  - additional instructions   Used for:  Tinea pedis of both feet        Apply topically 2 times daily To feet and toenails.   Quantity:  90 g   Refills:  4        hydrOXYzine 25 MG tablet   Commonly known as:  ATARAX   This may have changed:    - when to take this  - reasons to take this   Used for:  Itching, Anxiety        Dose:  25 mg   Take 1 tablet (25 mg) by mouth 2 times daily   Quantity:  180 tablet   Refills:  1        lactulose 10 GM/15ML solution   Commonly known as:  CHRONULAC   This may have changed:    - how much to take  - when to take this   Used for:  Hepatic encephalopathy (H)        Dose:  20 g   Take 30 mLs (20 g) by mouth 3 times daily   Quantity:  946 mL   Refills:  3    Titrate lactulose to 3-4 bowel movements per day         CONTINUE these medications which have NOT CHANGED        Dose / Directions Comments    blood glucose monitoring test strip   Commonly known as:  ONETOUCH ULTRA   Used for:  Diabetes mellitus, type 2 (H)        Use to test blood sugars 4 times daily as needed or as directed.   Quantity:  400 each   Refills:  1        Calcium  Carb-Cholecalciferol 500-400 MG-UNIT Tabs   Commonly known as:  calcium 500 +D   Used for:  Malnutrition (H)        Dose:  500 mg   Take 500 mg by mouth daily   Quantity:  90 tablet   Refills:  1        CANE, ANY MATERIAL   Used for:  Balance disorder        One cane   Quantity:  1 each   Refills:  0        carBAMazepine 200 MG tablet   Commonly known as:  TEGretol   Used for:  Partial symptomatic epilepsy with complex partial seizures, not intractable, without status epilepticus (H)        Dose:  200 mg   Take 1 tablet (200 mg) by mouth 2 times daily   Quantity:  120 tablet   Refills:  11        cyanocobalamin 1000 MCG Tabs        Dose:  1 tablet   Take 1 tablet by mouth daily   Refills:  0        ferrous sulfate 325 (65 Fe) MG tablet   Commonly known as:  IRON   Used for:  Other iron deficiency anemia        Dose:  1 tablet   Take 1 tablet (325 mg) by mouth 2 times daily   Quantity:  200 tablet   Refills:  3        folic acid 1 MG tablet   Commonly known as:  FOLVITE   Used for:  Protein-calorie malnutrition, unspecified severity (H)        Dose:  1 mg   Take 1 tablet (1 mg) by mouth daily   Quantity:  30 tablet   Refills:  0        furosemide 20 MG tablet   Commonly known as:  LASIX   Used for:  Other ascites        Dose:  20 mg   Take 1 tablet (20 mg) by mouth daily   Quantity:  30 tablet   Refills:  3        gabapentin 100 MG capsule   Commonly known as:  NEURONTIN   Used for:  Mild episode of recurrent major depressive disorder (H)        Dose:  100 mg   Take 1 capsule (100 mg) by mouth At Bedtime   Quantity:  30 capsule   Refills:  3        HYDROcodone-acetaminophen 5-325 MG per tablet   Commonly known as:  NORCO   Used for:  Brain tumor (H)        Dose:  2 tablet   Take 2 tablets by mouth every 6 hours as needed for moderate to severe pain   Quantity:  60 tablet   Refills:  0        levothyroxine 88 MCG tablet   Commonly known as:  SYNTHROID/LEVOTHROID   Used for:  Hypothyroidism, unspecified type         "Dose:  88 mcg   Take 1 tablet (88 mcg) by mouth daily   Quantity:  90 tablet   Refills:  1        lidocaine 4 % Crea cream   Commonly known as:  LMX4   Used for:  Port catheter in place        Apply topically once as needed for mild pain   Quantity:  133 g   Refills:  1        methylphenidate 10 MG tablet   Commonly known as:  RITALIN   Used for:  Attention deficit hyperactivity disorder (ADHD), unspecified ADHD type        Dose:  10 mg   Take 1 tablet (10 mg) by mouth 2 times daily   Quantity:  60 tablet   Refills:  0        mirtazapine 45 MG tablet   Commonly known as:  REMERON   Used for:  Major depressive disorder with single episode, in partial remission (H)        Dose:  45 mg   Take 1 tablet (45 mg) by mouth At Bedtime   Quantity:  30 tablet   Refills:  2        multivitamin, therapeutic with minerals Tabs tablet        Dose:  1 tablet   Take 1 tablet by mouth 2 times daily   Refills:  0        omeprazole 20 MG CR capsule   Commonly known as:  priLOSEC   Used for:  Gastroesophageal reflux disease without esophagitis        Dose:  40 mg   Take 2 capsules (40 mg) by mouth 2 times daily   Quantity:  360 capsule   Refills:  3        ONETOUCH LANCETS Misc   Used for:  Type II or unspecified type diabetes mellitus without mention of complication, not stated as uncontrolled        by In Vitro route 4 times daily as needed   Quantity:  100 each   Refills:  prn    - As of January 1 pt's insurance will only cover onetouch or accu check.    - Patient needs a  new glucometer (one touch), too       order for DME   Used for:  Falls frequently, At high risk for falls, Imbalance, Limited mobility        Equipment being ordered: manual wheelchair and cushion  [employer] light weight Sx5 18x18, 17.5 inch floor height and a 2 inch 18x18 inch cushion  length of need : lifetime    Patient's height : 5/10\", weight : 172 pounds   Quantity:  1 Device   Refills:  0        pravastatin 80 MG tablet   Commonly known as:  PRAVACHOL   Used " for:  High cholesterol        Dose:  80 mg   Take 1 tablet (80 mg) by mouth daily   Quantity:  90 tablet   Refills:  3        spironolactone 25 MG tablet   Commonly known as:  ALDACTONE   Used for:  Other ascites        Dose:  50 mg   Take 2 tablets (50 mg) by mouth daily   Quantity:  180 tablet   Refills:  1        thiamine 100 MG tablet   Used for:  Alcoholic cirrhosis of liver with ascites (H)        Dose:  100 mg   Take 1 tablet (100 mg) by mouth daily   Quantity:  100 tablet   Refills:  1        traZODone 50 MG tablet   Commonly known as:  DESYREL   Used for:  Primary insomnia        Dose:  50 mg   Take 1 tablet (50 mg) by mouth nightly as needed for sleep   Quantity:  30 tablet   Refills:  2        Vitamin D (Cholecalciferol) 1000 units Tabs        Dose:  1 tablet   Take 1 tablet by mouth daily   Refills:  0        XIFAXAN 550 MG Tabs tablet   Used for:  Hepatic encephalopathy (H)   Generic drug:  rifaximin        TAKE ONE TABLET BY MOUTH TWICE DAILY   Quantity:  60 tablet   Refills:  11    Please consider 90 day supplies to promote better adherence               Summary of Visit     Reason for your hospital stay       You were hospitalized for altered mental status.             After Care     Activity       Your activity upon discharge: activity as tolerated       Diet       Follow this diet upon discharge: Orders Placed This Encounter      Fluid restriction 1800 ML FLUID      2 Gram Sodium Diet             Referrals     Home Care OT Referral for Hospital Discharge       [facility] [phone]  Fax [phone]    OT to eval and treat    Your provider has ordered home care - occupational therapy. If you have not been contacted within 2 days of your discharge please call the department phone number listed on the top of this document.       Home Care PT Referral for Hospital Discharge       [facility] [phone]  Fax [phone]    PT to eval and treat    Your provider has ordered home  care - physical therapy. If you have not been contacted within 2 days of your discharge please call the department phone number listed on the top of this document.       Home Care SLP Referral for Hospital Discharge       [facility] [phone]  Fax [phone]    SLP to eval and treat    Your provider has ordered home care - speech therapy. If you have not been contacted within 2 days of your discharge please call the department phone number listed on the top of this document.       Home care nursing referral       [facility] [phone]  Fax [phone]    RN skilled nursing visit. RN to assess vital signs and weight, respiratory and cardiac status, pain level and activity tolerance, hydration, nutrition and bowel status and home safety.  RN to teach medication management.  Home health aide to assist with ADL's    Your provider has ordered home care nursing services. If you have not been contacted within 2 days of your discharge please call the inpatient department phone number at [phone] .       Home infusion referral       Your provider has referred you to: [facility] [phone]   [email or web address]    Local Address (if different from home address): N/A    Anticipated Length of Therapy: per MD order    Home Infusion Pharmacist to adjust therapy based on labs and clinical assessments: Yes    Labs:  May draw labs from Venous Catheter: Yes  Home Infusion Pharmacist to order labs based on therapy type and clinical assessments: Yes  Call/Fax Lab Results to: [facility]  Appointments:   [phone]       Agency Staff to assess nursing needs for Infusion Therapy.    Access Device Management:  IV Access Type: Port-a-Cath  Flush with Heparin and Normal Saline IVP PRN and routine site care (per agency protocol) to maintain access device? Yes       Medication Therapy Management Referral       [facility]  referral reason            Patient has Lactulose or Rifaxamin as a PTA Med or a Discharge medication      Patient had a hospital or ED visit in last 6 months and has more than 10   PTA or Discharge medications    Patient has 5 PTA or Discharge Medications AND one of the following   diagnoses: DM,HF,COPD,AMI DX,PULM HTN       This service is designed to help you get the most from your medications.  A specially trained pharmacist will work closely with you and your doctors  to solve any problems related to your medications and to help you get the   best results from taking them.      The Medication Therapy Management staff will call you to schedule an appointment.              MD face to face encounter       Documentation of Face to Face and Certification for Home Health Services    I certify that patient: Mono Varghese is under my care and that I, or a nurse practitioner or physician's assistant working with me, had a face-to-face encounter that meets the physician face-to-face encounter requirements with this patient on: 11/8/2018.    This encounter with the patient was in whole, or in part, for the following medical condition, which is the primary reason for home health care: complex medical needs.    I certify that, based on my findings, the following services are medically necessary home health services: Nursing, Occupational Therapy and Physical Therapy.    My clinical findings support the need for the above services because: Nurse is needed: assessment and treatment, VS, medication management., Occupational Therapy Services are needed to assess and treat cognitive ability and address ADL safety due to impairment in mobility. and Physical Therapy Services are needed to assess and treat the following functional impairments: mobility.    Further, I certify that my clinical findings support that this patient is homebound (i.e. absences from home require considerable and taxing effort and are for medical reasons  or [employer] services or infrequently or of short duration when for other reasons) because: Requires assistance of another person or specialized equipment to access medical services because patient: Requires supervision of another for safe transfer...    Based on the above findings. I certify that this patient is confined to the home and needs intermittent skilled nursing care, physical therapy and/or speech therapy.  The patient is under my care, and I have initiated the establishment of the plan of care.  This patient will be followed by a physician who will periodically review the plan of care.  Physician/Provider to provide follow up care: King Louis    Attending hospital physician (the Medicare certified [facility] provider): Kenney Florez DO  Physician Signature: See electronic signature associated with these discharge orders.  Date: 11/8/2018                  Lab Orders     Blood culture       X 3 sites (two peripheral cultures, one right port culture)       CBC with platelets differential       Last Lab Result: Hemoglobin (g/dL)       Date                     Value                 11/06/2018               10.2 (L)         ----------       Comprehensive metabolic panel                 Your next 10 appointments already scheduled     Nov 09, 2018 10:20 AM CST   (Arrive by 10:05 AM)   Return Visit with King Louis MD   [facility] ([facility])    [address] [place] [address]  4th Floor  [facility] [address] [phone]            Nov 21, 2018 10:45 AM CST   MR ABDOMEN W/O & W CONTRAST with [facility] ([facility])    [address] [id number] [phone]           How do I prepare for my exam? (Food and drink instructions) Do not eat or drink for 6 hours prior to exam. **If you will be receiving sedation or general anesthesia, please see special notes below.**  How do I  prepare for my exam? (Other instructions) Take your medicines as usual, unless your doctor tells you not to. You may or may not receive IV contrast for this exam pending the discretion of the Radiologist.  **If you will be receiving sedation or general anesthesia, please see special notes below.**  What should I wear: The MRI machine uses a strong magnet. Please wear clothes without metal (snaps, zippers). A sweatsuit works well, or we may give you a hospital gown. Please remove any body piercings and hair extensions before you arrive. You will also remove watches, jewelry, hairpins, wallets, dentures, partial dental plates and hearing aids. You may wear contact lenses, and you may be able to wear your rings. We have a safe place to keep your personal items, but it is safer to leave them at home.  How long does the exam take: Most tests take 30 to 60 minutes.  HOWEVER, IF YOUR DOCTOR PRESCRIBES ANESTHESIA please plan on spending four to five hours in the recovery room.  What should I bring: Bring a list of your current medicines to your exam (including vitamins, minerals and over-the-counter drugs). Also bring the results of similar scans you may have had.  Do I need a : **If you will be receiving sedation or general anesthesia, please see special notes below.**  What should I do after the exam: No Restrictions, You may resume normal activities.  What is this test: MRI (magnetic resonance imaging) uses a strong magnet and radio waves to look inside the body. An MRA (magnetic resonance angiogram) does the same thing, but it lets us look at your blood vessels. A computer turns the radio waves into pictures showing cross sections of the body, much like slices of bread. This helps us see any problems more clearly. You may receive fluid (called  contrast ) before or during your scan. The fluid helps us see the pictures better. We give the fluid through an IV (small needle in your arm).  Who should I call with  questions: If you have any questions, please contact your Imaging Department exam site. Directions, parking instructions, and other information is available on our website, [email or web address].            Dec 03, 2018   Procedure with Guru Jose Kelly MD   [facility] (--)    [address] [facility] [address]   [phone]            Jan 02, 2019  1:00 PM CST   Adult Med Follow UP with Leroy Alcaraz MD   Psychiatry Clinic ([facility])    [address]  [address] [address]   [phone]            Feb 13, 2019  9:00 AM CST   Lab with  LAB   [facility] ([facility])    [address] [facility]  [address] [facility] [address]   [phone]            Feb 13, 2019 10:00 AM CST   (Arrive by 9:45 AM)   Return General Liver with Beatriz Tanner MD   [facility] Hepatology ([facility])    [address]  [address]  [facility] [address]   [phone]            Apr 26, 2019 10:00 AM CDT   MR BRAIN W/O & W CONTRAST with [address] MRI ([facility])    [address] [address]   [phone]           How do I prepare for my exam? (Food and drink instructions) **If you will be receiving sedation or general anesthesia, please see special notes below.**  How do I prepare for my exam? (Other instructions) Take your medicines as usual, unless your doctor tells you not to. You may or may not receive intravenous (IV) contrast for this exam pending the discretion of the Radiologist.  You do not need to do anything special to prepare.  **If you will be receiving sedation or general anesthesia, please see special notes below.**  What should I wear: The MRI machine uses a strong magnet. Please wear clothes without metal (snaps, zippers). A sweatsuit works well, or we may give you a  [facility] gown. Please remove any body piercings and hair extensions before you arrive. You will also remove watches, jewelry, hairpins, wallets, dentures, partial dental plates and hearing aids. You may wear contact lenses, and you may be able to wear your rings. We have a safe place to keep your personal items, but it is safer to leave them at home.  How long does the exam take: Most tests take 30 to 60 minutes.  HOWEVER, IF YOUR DOCTOR PRESCRIBES ANESTHESIA please plan on spending four to five hours in the recovery room.  What should I bring:  Bring a list of your current medicines to your exam (including vitamins, minerals and over-the-counter drugs).  Do I need a :  **If you will be receiving sedation or general anesthesia, please see special notes below.**  What should I do after the exam: No Restrictions, You may resume normal activities.  What is this test: MRI (magnetic resonance imaging) uses a strong magnet and radio waves to look inside the body. An MRA (magnetic resonance angiogram) does the same thing, but it lets us look at your blood vessels. A computer turns the radio waves into pictures showing cross sections of the body, much like slices of bread. This helps us see any problems more clearly. You may receive fluid (called  contrast ) before or during your scan. The fluid helps us see the pictures better. We give the fluid through an IV (small needle in your arm).  Who should I call with questions:  Please call the Imaging Department at your exam site with any questions. Directions, parking instructions, and other information is available on our website, [email or web address].  How do I prepare if I m having sedation or anesthesia? **IMPORTANT** THE INSTRUCTIONS BELOW ARE ONLY FOR THOSE PATIENTS WHO HAVE BEEN TOLD THEY WILL RECEIVE SEDATION OR GENERAL ANESTHESIA DURING THEIR MRI PROCEDURE:  IF YOU WILL RECEIVE SEDATION (take medicine to help you relax during your exam): You must get the medicine  from your doctor before you arrive. Bring the medicine to the exam. Do not take it at home. Arrive one hour early. Bring someone who can take you home after the test. Your medicine will make you sleepy. After the exam, you may not drive, take a bus or take a taxi by yourself. No eating 8 hours before your exam. You may have clear liquids up until 4 hours before your exam. (Clear liquids include water, clear tea, black coffee and fruit juice without pulp.)  IF YOU WILL RECEIVE ANESTHESIA (be asleep for your exam): Arrive 1 1/2 hours early. Bring someone who can take you home after the test. You may not drive, take a bus or take a taxi by yourself. No eating 8 hours before your exam. You may have clear liquids up until 4 hours before your exam. (Clear liquids include water, clear tea, black coffee and fruit juice without pulp.)            May 03, 2019 10:45 AM CDT   (Arrive by 10:30 AM)   Return Visit with Lauro Shaw MD   [facility] ([facility])    [address] [place] [address]  [address] [place] [id number] [phone]              Follow-Up Appointment Instructions     Future Labs/Procedures    Adult [facility] Follow-up and recommended labs and tests     Comments:    - Follow up with primary care provider, King Louis, within 7 days for hospital follow- up.  The following labs/tests are recommended: CMP, CBC w/ platelets in 1 week.    - Repeat blood cultures 11/23 (1 week after finishing antibiotics) and port cultures to look for E.coli that may have seeded port.   Follow up with ID only if blood cultures return positive.     Appointments on [place] and/or [facility] (with [facility] or [facility] provider or service). Call [phone] if you haven't heard regarding these appointments within 7 days of discharge.      Follow-Up Appointment Instructions     Adult [facility] Follow-up and recommended labs and tests       - Follow up with primary care provider, King CAMPUZANO  Ofstedal, within 7 days for hospital follow- up.  The following labs/tests are recommended: CMP, CBC w/ platelets in 1 week.    - Repeat blood cultures 11/23 (1 week after finishing antibiotics) and port cultures to look for E.coli that may have seeded port.   Follow up with ID only if blood cultures return positive.     Appointments on [place] and/or [facility] (with [facility] or [facility] provider or service). Call [phone] if you haven't heard regarding these appointments within 7 days of discharge.             Statement of Approval     Ordered          11/08/18 0951  I have reviewed and agree with all the recommendations and orders detailed in this document.  EFFECTIVE NOW     Approved and electronically signed by:  Daniel eHnson MD

## 2018-11-01 NOTE — TELEPHONE ENCOUNTER
Prior Authorization Specialty Medication Request    Medication/Dose: Xifaxan 550 mg   ICD code (if different than what is on RX):    Previously Tried and Failed:  Lactulose    Important Lab Values: Albumin 2.8, Alk Phos 263  Rationale: Xifaxan helps to lower the toxin build up in the blood while lactulose works by cleansing the toxin build up in the liver. Adjunctive therapy.      Insurance Name:   Insurance ID:   Insurance Phone Number:     Pharmacy Information (if different than what is on RX)  Name:    Phone:

## 2018-11-01 NOTE — TELEPHONE ENCOUNTER
[facility] Call Center    Phone Message    May a detailed message be left on voicemail: yes    Reason for Call: Other: Angelique, home care nurse, is calling to follow up regarding this pt. He has been experiencing increased confusion and difficulty doing things. She is wondering if the pt may need more antibiotics since he had only been given 4 days worth, despite originally being told that he should take antibiotics for 2 weeks. To ensure that whatever nurse is with the pt at the time gets the instructions regarding this, she asks that we give a call back to pt's wife Yisel to discuss this. Yisel can be reached at [phone].     Action Taken: Message routed to:  [facility] Center

## 2018-11-01 NOTE — TELEPHONE ENCOUNTER
Health Call Center    Phone Message    May a detailed message be left on voicemail: yes    Reason for Call: Other: Aissatou had called on Tuesday to give Dr. King Louis an update on Taye declining health..Mono fell yesterday, today his blood augar is up today, swelling in his legs, confused, can't find his words.Edith is concerned that Mono is gong to end up back in the hospital . He was suupose to home with 2 weeks worth of anitbotics, only came home with 4 days. Wondering if he should have more anitbotics. Need to know what to do with Mono, needing guidance from Dr.l King Louis and what he is wanting to do. Pleaae call Edith OCHOA today. Thank you     Action Taken: Message routed to:  [facility] Primary

## 2018-11-02 NOTE — TELEPHONE ENCOUNTER
DME order for elevating leg rest for his Wheel Chair was faxed to [facility] at [phone] -- per OT request.

## 2018-11-02 NOTE — PROGRESS NOTES
"  Care Coordinator Progress Note    Admission Date/Time:  11/1/2018  Attending MD:  Paras Hurtado MD    Data  Chart reviewed, discussed with interdisciplinary team.   Patient was admitted for:    Hepatic encephalopathy (H)  Alcoholic cirrhosis of liver with ascites (H).    Concerns with insurance coverage for discharge needs: None.  Current Living Situation: Patient lives with family.  Support System: Supportive and Involved  Services Involved: Home Care  Transportation at Discharge: Family or friend will provide  Transportation to Medical Appointments:  Family or has [employer] provide a ride  Barriers to Discharge: medical needs    Coordination of Care and Referrals: Provided patient/family with options for Home Care.        Assessment  Patient admitted with acute encephalopathy and generalized weakness. History of left parietal astrocytoma s/p resection, alcoholic cirrhosis, hepatic encephalopathy, rectal and esophageal varices, and hypothyroidism.   Chart reviewed. Met with patient at bedside to discuss discharge needs. Patient lives at home with spouse and mother in law. Open to [facility] for RN, HHA, PT, OT and speech services; resumption orders placed. Patient has received and using a wheelchair at home and reports its \"awesome\".   Per medicine team, patient to remain hospitalized through the weekend. RNCC to continue to follow for discharge planning and needs.     Plan  Anticipated Discharge Date:  TBD  Anticipated Discharge Plan:  Home with resumption of home care    Kyleigh Aburto RN        "

## 2018-11-02 NOTE — H&P
[facility], [place]    Internal Medicine History and Physical - [facility] Service       Date of Admission:  11/1/2018    Chief Complaint   Encephalopathy  Generalized weakness    History is obtained from the patient's spouse    History of Present Illness   Mono Varghese is a 48 year old male  who has a history of left parietal astrocytoma s/p resection, Alcoholic cirrhosis, hepatic encephalopathy, rectal and esophageal varices and PVT not on anticoagulation, hypothyroidism and is admitted for acute encephalopathy and generalized weakness that started 4 days ago.    Difficult to get history from patient, responsive and alert but has trouble speaking. History obtained from wife.     She states that he was recently discharged and was doing fine but started to feel weak and confused on Monday after finished his antibiotic regimen for recent E.coli bacteremia. He continued to take his meds regularly including daily lactulose with BM 5-6/day. He started getting more confused yesterday and decided to come to the ED for further management. She endorses that he has been feeling cold, feverish and had a temp 100F last night. He also has had worsening of cough, non-productive. She states that he is not nauseated, vomiting, hematochezia or abdominal pain.     She also notes that he has had worsening swelling of his legs that started this week.     ED course: Received IV fluids and lactulose 20g once.     Review of Systems   The 10 point Review of Systems is negative other than noted in the HPI or here.     Past Medical History    I have reviewed this patient's medical history and updated it with pertinent information if needed.   Past Medical History:   Diagnosis Date     ADHD      Alcoholic cirrhosis of liver with ascites (H) 06/17/2015    cirrhosis secondary to alcohol, complicated by variceal bleeding and hepatic encephalopathy      Ascites due to alcoholic cirrhosis (H)     Requiring  regular paracentesis.     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 status post subtotal resection by Dr. J Carlos Aranda in early 06/2013. Negative for 1p/19q deletions. S/P Concurrent chemoradiation July 15 through August 23, 2013. Initiated adjuvant oral temozolomide 9/17/13; switch     H/O LENA (obstructive sleep apnea)-moderate 12/18/2012    Pt states this is not currently an issue.     Hyperlipidemia LDL goal <100 8/1/2016     Hypothyroidism 8/1/2016     Liver failure (H)      Moderate major depression (H) 10/3/2012     Obesity      Pancytopenia (H) 8/1/2016    Chronic pancytopenia secondary to liver disease since at least 2012      Partial epilepsy with impairment of consciousness (H) 05/07/2014     Portal hypertensive gastropathy (H)      Portal vein thrombosis 12/18/2015    history of left lower extremity deep vein thrombosis as well as portal vein and superior mesenteric vein thrombosis, late 2015 when he was hospitalized in the ICU and seriously ill temporary IVC filter placed in 12/2015 when he was in the ICU with deep vein thromboses and active bleeding      Pulmonary nodules 6/17/2015     Rectal bleeding      Seizures (H)      Type 2 diabetes mellitus (H) 10/3/2012        Past Surgical History   I have reviewed  this patient's surgical history and updated it with pertinent information if needed.  Past Surgical History:   Procedure Laterality Date     COLONOSCOPY N/A 11/27/2015    Procedure: COMBINED COLONOSCOPY, SINGLE OR MULTIPLE BIOPSY/POLYPECTOMY BY BIOPSY;  Surgeon: Ran Thurston MD;  Location: [facility]     ENDOSCOPIC ULTRASOUND LOWER GASTROINTESTIONAL TRACT (GI) N/A 10/15/2018    Procedure: Rectal Endoscopic Ultrasound **Latex Allergy** with coiling and glueing of rectal varices;  Surgeon: Guru Jose Kelly MD;  Location: [facility] OR     ENDOSCOPIC ULTRASOUND UPPER GASTROINTESTINAL TRACT (GI) N/A 10/1/2018    Procedure: ENDOSCOPIC ULTRASOUND, ESOPHAGOSCOPY / UPPER GASTROINTESTINAL TRACT (GI);;  Surgeon: Guru Jose Kelly MD;  [facility]: [facility]     ESOPHAGOSCOPY, GASTROSCOPY, DUODENOSCOPY (EGD), COMBINED N/A 11/23/2015    Procedure: COMBINED ESOPHAGOSCOPY, GASTROSCOPY, DUODENOSCOPY (EGD);  Surgeon: Rocael Rizvi MD;  Location: [facility]     ESOPHAGOSCOPY, GASTROSCOPY, DUODENOSCOPY (EGD), COMBINED N/A 1/25/2017    Procedure: COMBINED ESOPHAGOSCOPY, GASTROSCOPY, DUODENOSCOPY (EGD), BIOPSY SINGLE OR MULTIPLE;  Surgeon: Rocael Tejada MD;  Location: [facility]     ESOPHAGOSCOPY, GASTROSCOPY, DUODENOSCOPY (EGD), COMBINED N/A 3/30/2017    Procedure: COMBINED ESOPHAGOSCOPY, GASTROSCOPY, DUODENOSCOPY (EGD);  Surgeon: Jordana Ramirez MD;  [facility]: [facility]     ESOPHAGOSCOPY, GASTROSCOPY, DUODENOSCOPY (EGD), COMBINED N/A 1/19/2018    Procedure: COMBINED ESOPHAGOSCOPY, GASTROSCOPY, DUODENOSCOPY (EGD);  Upper Endoscopy with verceal banding; Flexible Sigmoidoscopy;  Surgeon: Guru Jose Kelly MD;  [facility]     ESOPHAGOSCOPY, GASTROSCOPY, DUODENOSCOPY (EGD), COMBINED N/A 2/12/2018    Procedure: COMBINED ESOPHAGOSCOPY, GASTROSCOPY, DUODENOSCOPY (EGD);  Esophagogastroduodenoscopy with variceal banding;  Surgeon: Guru Jose Kelly MD;  [facility]     ESOPHAGOSCOPY, GASTROSCOPY, DUODENOSCOPY (EGD), COMBINED N/A 3/5/2018    Procedure: COMBINED ESOPHAGOSCOPY, GASTROSCOPY, DUODENOSCOPY (EGD);  Esophagogastroduodenoscopy  with banding of varices Latex Allergy ;  Surgeon: Guru Jose Kelly MD;  [facility]     ESOPHAGOSCOPY, GASTROSCOPY, DUODENOSCOPY (EGD), COMBINED N/A 4/16/2018    Procedure: COMBINED ESOPHAGOSCOPY, GASTROSCOPY, DUODENOSCOPY (EGD);  Upper Endoscopy  with esophageal varices banding; Latex Allergy ;  Surgeon: Guru Jose Kelly MD;  [facility]: [facility]     ESOPHAGOSCOPY, GASTROSCOPY, DUODENOSCOPY (EGD), COMBINED N/A 5/30/2018    Procedure: COMBINED ESOPHAGOSCOPY, GASTROSCOPY, DUODENOSCOPY (EGD);  upper endoscopy with banding of esophageal varices. *latex Allergy*;  Surgeon: Guru Jose Kelly MD;  Location:  OR     OPTICAL TRACKING SYSTEM CRANIOTOMY, EXCISE TUMOR, COMBINED  6/6/2013    Procedure: COMBINED OPTICAL TRACKING SYSTEM CRANIOTOMY, EXCISE TUMOR;  Left Stealth Guided Craniotomy , Tumor Resection ;  Surgeon: J Carlos Aranda MD;  Location:  OR     ORTHOPEDIC SURGERY      hand surgery- right     SIGMOIDOSCOPY FLEXIBLE N/A 11/24/2015    Procedure: SIGMOIDOSCOPY FLEXIBLE;  Surgeon: Rocael Rizvi MD;  Location:  GI     SIGMOIDOSCOPY FLEXIBLE N/A 1/19/2018    Procedure: SIGMOIDOSCOPY FLEXIBLE;;  Surgeon: Guru Jose Kelly MD;  Location:  OR     SIGMOIDOSCOPY FLEXIBLE N/A 10/1/2018    Procedure: SIGMOIDOSCOPY FLEXIBLE;;  Surgeon: Guru Jose Kelly MD;  Location:  OR        Social History   Social History   Substance Use Topics     Smoking status: Never Smoker     Smokeless tobacco: Never Used     Alcohol use No      Comment: Quit 01/2014       Family History   I have reviewed this patient's family history and updated it with pertinent information if needed.   Family History   Problem Relation Age of Onset     Adopted: Yes     Medical  History Unknown Mother      Cirrhosis Father      Medical History Unknown Brother      Medical History Unknown Brother      Medical History Unknown Brother        Prior to Admission Medications   Prior to Admission Medications   Prescriptions Last Dose Informant Patient Reported? Taking?   CANE, ANY MATERIAL Unknown at Unknown time  No No   Sig: One cane   Calcium Carb-Cholecalciferol (CALCIUM 500 +D) 500-400 MG-UNIT TABS Unknown at Unknown time  No No   Sig: Take 500 mg by mouth daily   HYDROcodone-acetaminophen (NORCO) 5-325 MG per tablet Unknown at Unknown time  No No   Sig: Take 2 tablets by mouth every 6 hours as needed for moderate to severe pain   ONE TOUCH LANCETS MISC Unknown at Unknown time  No No   Sig: by In Vitro route 4 times daily as needed   Vitamin D, Cholecalciferol, 1000 units TABS Unknown at Unknown time  Yes No   Sig: Take 1 tablet by mouth daily   XIFAXAN 550 MG TABS tablet Unknown at Unknown time  No No   Sig: TAKE ONE TABLET BY MOUTH TWICE DAILY   blood glucose monitoring (ONETOUCH ULTRA) test strip Unknown at Unknown time  No No   Sig: Use to test blood sugars 4 times daily as needed or as directed.   carBAMazepine (TEGRETOL) 200 MG tablet Unknown at Unknown time  No No   Sig: Take 1 tablet (200 mg) by mouth 2 times daily   ciclopirox (LOPROX) 0.77 % cream Unknown at Unknown time  No No   Sig: Apply topically 2 times daily To feet and toenails.   cyanocobalamin 1000 MCG TABS Unknown at Unknown time  Yes No   Sig: Take 1 tablet by mouth daily   ferrous sulfate (IRON) 325 (65 FE) MG tablet Unknown at Unknown time  No No   Sig: Take 1 tablet (325 mg) by mouth 2 times daily   folic acid (FOLVITE) 1 MG tablet Unknown at Unknown time  No No   Sig: Take 1 tablet (1 mg) by mouth daily   furosemide (LASIX) 20 MG tablet Unknown at Unknown time  No No   Sig: Take 1 tablet (20 mg) by mouth daily   gabapentin (NEURONTIN) 100 MG capsule Unknown at Unknown time  No No   Sig: Take 1 capsule (100 mg) by  "mouth At Bedtime   hydrOXYzine (ATARAX) 25 MG tablet Unknown at Unknown time  No No   Sig: Take 1 tablet (25 mg) by mouth 2 times daily   Patient taking differently: Take 25 mg by mouth every evening    lactulose (CHRONULAC) 10 GM/15ML solution Unknown at Unknown time  No No   Sig: Take 30 mLs (20 g) by mouth 3 times daily   Patient taking differently: Take 30 mLs by mouth 2 times daily    levothyroxine (SYNTHROID/LEVOTHROID) 88 MCG tablet Unknown at Unknown time  No No   Sig: Take 1 tablet (88 mcg) by mouth daily   lidocaine (LMX4) 4 % CREA cream Unknown at Unknown time  No No   Sig: Apply topically once as needed for mild pain   methylphenidate (RITALIN) 10 MG tablet Unknown at Unknown time  No No   Sig: Take 1 tablet (10 mg) by mouth 2 times daily   mirtazapine (REMERON) 45 MG tablet Unknown at Unknown time  No No   Sig: Take 1 tablet (45 mg) by mouth At Bedtime   multivitamin, therapeutic with minerals (THERA-VIT-M) TABS Unknown at Unknown time Self Yes No   Sig: Take 1 tablet by mouth 2 times daily   omeprazole (PRILOSEC) 20 MG CR capsule Unknown at Unknown time  No No   Sig: Take 2 capsules (40 mg) by mouth 2 times daily   order for DME Unknown at Unknown time  No No   Sig: Equipment being ordered: manual wheelchair and cushion    [employer] light weight Sx5 18x18, 17.5 inch floor height and a 2 inch 18x18 inch cushion    length of need : lifetime        Patient's height : 5/10\", weight : 172 pounds   pravastatin (PRAVACHOL) 80 MG tablet Unknown at Unknown time  No No   Sig: Take 1 tablet (80 mg) by mouth daily   spironolactone (ALDACTONE) 25 MG tablet Unknown at Unknown time  No No   Sig: Take 2 tablets (50 mg) by mouth daily   thiamine (VITAMIN B-1) 100 MG tablet Unknown at Unknown time  No No   Sig: Take 1 tablet (100 mg) by mouth daily   traZODone (DESYREL) 50 MG tablet Unknown at Unknown time  No No   Sig: Take 1 tablet (50 mg) by mouth nightly as needed for sleep      Facility-Administered Medications: " None     Allergies   Allergies   Allergen Reactions     Latex Itching and Rash     No Clinical Screening - See Comments      Coban and Surgilast cause itching     Tegaderm Transparent Dressing (Informational Only)        Physical Exam   Vital Signs: Temp: 98.8  F (37.1  C) Temp src: Oral BP: (!) 135/100 Pulse: 78 Heart Rate: 81 Resp: 19 SpO2: 98 % O2 Device: None (Room air)    Weight: 170 lbs 0 oz    General Appearance: Cooperative, Awake and Alert, cachectic-appearing, lying down on right-side  HEENT: EOMI, PERRL, poor dentition, no scleral icterus  Respiratory: Clear to ausculation bilaterally  Cardiovascular: RRR, no m/r/g  GI: Normal BS, diffusely tender and distended.   Extremeties: +3 pitting edema in RLE, cold to touch, +2 pedal pulses  Neurologic: Alert but not oriented to person, place or time. However oriented to wife's name and responsive to wife, normal strength bilaterally  Psychiatric: Unable to assess    Assessment & Plan   Mono Varghese is a 48 year old male admitted on 11/1/2018. He has a left parietal astrocytoma s/p resection, Alcoholic cirrhosis, hepatic encephalopathy, rectal and esophageal varices and PVT not on anticoagulation, hypothyroidism and is admitted for acute encephalopathy and generalized weakness that started 4 days ago.      # Encephalopathy  # Alcoholic cirrhosis c/b refractory ascites, MELD 14  #h/o Esophageal and Rectal Varicies  Recently discharged on 10/22. Presenting with confusion and generalized weakness. Cooperative, Alert and Awake, difficult to assess orientation. Wife reports 5-6 BM/day. Ammonia 111. Exam notable for diffuse abdominal tenderness and distention. Lactulose given in the ED.   - diagnostic para this PM to r/o SBP, pending  - scheduled lactulose and titrate BM to 6-7/day  - consider therapeutic paracentesis in the AM  - fall precautions  - HE protocol  - Speech eval consult placed  - CMP + INR in AM  - continue to monitor    #?Unresolved E.coli  bacteremia  Recent h/o of E.coli bacteremia (10/22) that was treated with a 7-day course of Augmentin. During previous admission, no known source with negative paracentesis, UA, abdominal US, and a HIDA scan. Source was thought to be from recent rectal variceal coiling. On this admission, normal lactate 1.4, no leukocytosis, afebrile, UA clean, CXR unremarkable. Difficult to say source of infection, likely unresolved E.coli bacteremia/poor source control.  - add-on pro-louise and CRP pending  - blood cultures pending  - urine cultures pending  - ceftriaxone 2g given, await culture results   - continue to monitor for sepsis  - ID consult, discuss case in morning       # Cough  Non-productive cough, afebrile, CXR unremarkable. Likely due to abdominal distension due to worsening ascites. Might have improvement after therapeutic paracentesis.   - continue to monitor  - will need a paracentesis soon (last one 1.5 weeks ago)    #Right LE edema  Started a few days ago. On exam, right-sided pitting edema. Patient has h/o right sided weakness and prefers to lie on right side. Likely dependant edema or poor flow from lying on his right side.   - continue to monitor    #Hypokalemia  K 3.2 on admission  - replaced per protocol    #JOSEF  Cr 1.03 on admission (baseline ~0.8).   - hold PTA lasix and spironolactone an re-assess in AM  - BMP in AM    #Pancytopenia  Hg 11.1, WBC 3.2, Plt 48. Have remained stable. No source of bleeding  - continue to trend  - CMP in AM    -----Chronic Medical Problems------  # h/o Seizures  #Hx of astrocytoma s/p chem, resection, radiation  - continue PTA carbamazepine    #Hypothyrodism  - TSH, free T4  - continue PTA synthroid    #HLD  - continue PTA pravastatin    #GERD  -continue PTA omeprazole    Diet:  High Kcal/Protein diet, 1.5L fluid restriction  Fluids: None  Lines: PIV, RIJ port a cath --> present for 5 yrs. Allergic reaction to IV tubing?   Munoz Catheter: not present    DVT Prophylaxis:  Pneumatic Compression Devices  Code Status: DNR/DNI  Expected discharge: 2 - 3 days, recommended to prior living arrangement once mental status at baseline.     The patient was discussed with Dr. Bryant Tanner MD  [facility]   Pager: [phone]  Please see sticky note for cross cover information      Data     Recent Labs  Lab 11/01/18  1711   WBC 2.7*   HGB 11.1*   MCV 96   PLT 48*   INR 1.51*      POTASSIUM 3.2*   CHLORIDE 107   CO2 22   BUN 16   CR 1.03   ANIONGAP 7   UCHE 8.2*   *   ALBUMIN 2.8*   PROTTOTAL 6.4*   BILITOTAL 1.6*   ALKPHOS 339*   ALT 30   AST 37       Recent Results (from the past 24 hour(s))   XR Chest Port 1 View    Narrative    Exam: XR CHEST PORT 1 VW, 11/1/2018 5:34 PM    Indication: fever;     Comparison: Chest radiograph 10/20/2018    Findings:   Supine AP view of the chest. Right IJ Port-A-Cath with the tip in the  low right atrium. Cardiomediastinal silhouette is stable. Pulmonary  vasculature is indistinct. No new airspace opacity, pleural effusion,  or pneumothorax. Bibasilar atelectasis. Upper abdomen is unremarkable.      Impression    Impression: No acute airspace opacity.    I have personally reviewed the examination and initial interpretation  and I agree with the findings.    ELIAS RABAGO MD

## 2018-11-02 NOTE — PLAN OF CARE
Problem: Patient Care Overview  Goal: Plan of Care/Patient Progress Review  OT 5B  Discharge Planner OT   Patient plan for discharge: Educated pt on discharge planning, pt hoping to discharge to home but understanding if TCU is needed  Current status: Eval complete, treatment indicated. SBA supine to EOB. Min A EOB to supine. CGA for bilateral rolling in bed. Max A for changing brief and sheets while supine in bed.   Barriers to return to prior living situation: fatigue, deconditioning, weakness, cognitive deficits, acute medical needs  Recommendations for discharge: TCU  Rationale for recommendations: Pt is well below baseline and would benefit from continued skilled therapy to increase activity tolerance and independence with ADLs       Entered by: Rina Whaley 11/02/2018 1:22 PM

## 2018-11-02 NOTE — SUMMARY OF CARE
Pt admitted to 5B with following belongings: 1 Sweat shirt, Samsung I pad, cell phone, and  for both

## 2018-11-02 NOTE — ED NOTES
[facility], [place]   ED Nurse to Floor Handoff     Mono Varghese is a 48 year old male who speaks English and lives with a spouse,  in a home  They arrived in the ED by ambulance from home    ED Chief Complaint: Altered Mental Status and Generalized Weakness    ED Dx;   Final diagnoses:   Hepatic encephalopathy (H)         Needed?: No    Allergies:   Allergies   Allergen Reactions     Latex Itching and Rash     No Clinical Screening - See Comments      Coban and Surgilast cause itching     Tegaderm Transparent Dressing (Informational Only)    .  Past Medical Hx:   Past Medical History:   Diagnosis Date     ADHD      Alcoholic cirrhosis of liver with ascites (H) 06/17/2015    cirrhosis secondary to alcohol, complicated by variceal bleeding and hepatic encephalopathy      Ascites due to alcoholic cirrhosis (H)     Requiring regular paracentesis.     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 status post subtotal resection by Dr. J Carlos Aranda in early 06/2013. Negative for 1p/19q deletions. S/P Concurrent chemoradiation July 15 through August 23, 2013. Initiated adjuvant oral temozolomide 9/17/13; switch     H/O LENA (obstructive sleep apnea)-moderate 12/18/2012    Pt states this is not  currently an issue.     Hyperlipidemia LDL goal <100 8/1/2016     Hypothyroidism 8/1/2016     Liver failure (H)      Moderate major depression (H) 10/3/2012     Obesity      Pancytopenia (H) 8/1/2016    Chronic pancytopenia secondary to liver disease since at least 2012      Partial epilepsy with impairment of consciousness (H) 05/07/2014     Portal hypertensive gastropathy (H)      Portal vein thrombosis 12/18/2015    history of left lower extremity deep vein thrombosis as well as portal vein and superior mesenteric vein thrombosis, late 2015 when he was hospitalized in the ICU and seriously ill temporary IVC filter placed in 12/2015 when he was in the ICU with deep vein thromboses and active bleeding      Pulmonary nodules 6/17/2015     Rectal bleeding      Seizures (H)      Type 2 diabetes mellitus (H) 10/3/2012      Baseline Mental status:  wife states he is alert and orientated as his base line normally,  When he has confusion and trouble speaking clearly he has a high ammonia level when that lab is with in normal limits he is alert and orientated.Current with in normal limits Mental Status changes: confusion at times,  Garbled words    Infection present or suspected this encounter: cultures pending  Sepsis suspected: No  Isolation type: No active isolations     Activity level - Baseline/Home:  Stand with Assist  Activity Level - Current:   Total Care    Bariatric equipment needed?: No    In the ED these meds were given:   Medications   lactated ringers BOLUS 1,000 mL (0 mLs Intravenous Stopped 11/1/18 2013)     Followed by   lactated ringers infusion (1,000 mLs Intravenous New Bag 11/1/18 2013)   lactulose (CHRONULAC) solution 20 g (not administered)       Drips running?  Yes    Home pump  No    Current LDAs  Port A Cath Single 01/14/14 Right Chest wall (Active)   Number of days:1752       Labs results:   Labs Ordered and Resulted from Time of ED Arrival Up to the Time of Departure from the ED   CBC WITH  PLATELETS DIFFERENTIAL - Abnormal; Notable for the following:        Result Value    WBC 2.7 (*)     RBC Count 3.43 (*)     Hemoglobin 11.1 (*)     Hematocrit 33.0 (*)     RDW 15.7 (*)     Platelet Count 48 (*)     Absolute Lymphocytes 0.3 (*)     All other components within normal limits   COMPREHENSIVE METABOLIC PANEL - Abnormal; Notable for the following:     Potassium 3.2 (*)     Glucose 168 (*)     Calcium 8.2 (*)     Bilirubin Total 1.6 (*)     Albumin 2.8 (*)     Protein Total 6.4 (*)     Alkaline Phosphatase 339 (*)     All other components within normal limits   ROUTINE UA WITH MICROSCOPIC - Abnormal; Notable for the following:     Protein Albumin Urine 10 (*)     Mucous Urine Present (*)     Calcium Oxalate Few (*)     All other components within normal limits   INR - Abnormal; Notable for the following:     INR 1.51 (*)     All other components within normal limits   PARTIAL THROMBOPLASTIN TIME - Abnormal; Notable for the following:     PTT 42 (*)     All other components within normal limits   AMMONIA - Abnormal; Notable for the following:     Ammonia 111 (*)     All other components within normal limits   ISTAT  GASES LACTATE CALI POCT - Abnormal; Notable for the following:     PCO2 Venous 29 (*)     Bicarbonate Venous 18 (*)     All other components within normal limits   PULSE OXIMETRY NURSING   CARDIAC CONTINUOUS MONITORING   MEASURE URINE OUTPUT   PATIENT CARE ORDER   ISTAT CG4 GASES LACTATE CALI NURSING POCT   URINE CULTURE AEROBIC BACTERIAL   BLOOD CULTURE   BLOOD CULTURE       Imaging Studies:   Recent Results (from the past 24 hour(s))   XR Chest Port 1 View    Narrative    Exam: XR CHEST PORT 1 VW, 11/1/2018 5:34 PM    Indication: fever;     Comparison: Chest radiograph 10/20/2018    Findings:   Supine AP view of the chest. Right IJ Port-A-Cath with the tip in the  low right atrium. Cardiomediastinal silhouette is stable. Pulmonary  vasculature is indistinct. No new airspace opacity, pleural  "effusion,  or pneumothorax. Bibasilar atelectasis. Upper abdomen is unremarkable.      Impression    Impression: No acute airspace opacity.    I have personally reviewed the examination and initial interpretation  and I agree with the findings.    ELIAS RABAGO MD       Recent vital signs:   /83  Pulse 78  Temp 98.8  F (37.1  C) (Oral)  Resp 19  Ht 1.778 m (5' 10\")  Wt 77.1 kg (170 lb)  SpO2 98%  BMI 24.39 kg/m2    Cardiac Rhythm: Other no ekg ordered  Pt needs tele? No  Skin/wound Issues:  incont stools, potential for skin break down, skin dry and flaky    Code Status: Full Code    Pain control: good    Nausea control: good    Abnormal labs/tests/findings requiring intervention: Dr meadows/ orders as needed     Family present during ED course? Yes   Family Comments/Social Situation comments: none    Tasks needing completion: None    Jessica Jhaveri RN      [phone] [facility] ED    "

## 2018-11-02 NOTE — PROGRESS NOTES
11/02/18 1100   Quick Adds   Type of Visit Initial Occupational Therapy Evaluation   Living Environment   Lives With spouse   Living Arrangements house  ([facility])   Home Accessibility stairs within home;stairs (1 railing present);tub/shower is not walk in;grab bars present (bathtub);grab bars present (toilet);bed and bath are not on the first floor;bed and bath on same level   Number of Stairs to Enter Home 0   Number of Stairs Within Home 17   Stair Railings at Home inside, present on left side   Transportation Available family or friend will provide   Living Environment Comment Pt lives with his wife in a townhouse, his bedroom and bathroom are upstairs   Self-Care   Dominant Hand ambidextrous   Usual Activity Tolerance good   Current Activity Tolerance fair   Regular Exercise yes  (Pt receives HH OT/PT once per week)   Activity/Exercise Type strength training   Exercise Amount/Frequency 2 times/wk   Equipment Currently Used at Home grab bar;shower chair;cane, straight;walker, rolling;wheelchair, manual   Activity/Exercise/Self-Care Comment Pt wife and mother in law are pt PCA. He receives OCA services 4 hours per day, however someone is home with him 24/7 due to level of care needed. He receives HH OT/PT/speech, RN and an aid come into the home once per week .   Functional Level Prior   Ambulation 3-->assistive equipment and person   Transferring 3-->assistive equipment and person   Toileting 3-->assistive equipment and person   Bathing 3-->assistive equipment and person   Dressing 2-->assistive person   Eating 0-->independent   Communication 2-->difficulty speaking (not related to language barrier)   Cognition 1 - attention or memory deficits   Fall history within last six months yes   Number of times patient has fallen within last six months 20   Which of the above functional risks had a recent onset or change? ambulation;transferring;toileting;bathing;dressing;cognition       Present no    Language english   General Information   Onset of Illness/Injury or Date of Surgery - Date 11/01/18   Referring Physician Lilian Dumont MD   Patient/Family Goals Statement To return home   Additional Occupational Profile Info/Pertinent History of Current Problem Mono Varghese is a 48 year old male admitted on 11/1/2018. He has a left parietal astrocytoma s/p resection, Alcoholic cirrhosis, hepatic encephalopathy, rectal and esophageal varices and portal vein thrombosis (not on anticoagulation), hypothyroidism and is admitted for encephalopathy and generalized weakness X 4 days. He was discharged 10/22 after hospitalization for encephalopathy and was found to have EColi bacteremia treated with a 7-day course of Augmentin. Source was presumed to be from recent rectal variceal coiling as he had a negative paracentesis, UA, abdominal US, and a HIDA scan.      General Observations Pt is pleasant and agreeable to therapy   General Info Comments Activity: ambulate with assist   Cognitive Status Examination   Orientation person;place   Cognitive Comment Pt is oriented to person and place. Pt has congitive deficits, further testing needed. Will continue to monitor   Visual Perception   Visual Perception Wears glasses   Visual Perception Comments Pt denies acute visual changes   Sensory Examination   Sensory Comments Pt denies any numbness or tingling   Integumentary/Edema   Integumentary/Edema Comments Pt has BLE edema   Range of Motion (ROM)   ROM Comment BUE ROM WFL   Strength   Strength Comments BUE grossly 5/5, however generalized weakness   Hand Strength   Hand Strength Comments Bilateral  strengthen WFL   Mobility   Bed Mobility Bed mobility skill: Sit to supine;Bed mobility skill: Supine to sit   Bed Mobility Skill: Sit to Supine   Level of [place]: Sit/Supine minimum assist (75% patients effort)   Physical Assist/Nonphysical Assist: Sit/Supine 1 person assist   Bed Mobility Skill: Supine to  "Sit   Level of [place]: Supine/Sit stand-by assist   Physical Assist/Nonphysical Assist: Supine/Sit supervision   Lower Body Dressing   Level of [place]: Dress Lower Body dependent (less than 25% patients effort)   Physical Assist/Nonphysical Assist: Dress Lower Body 1 person assist   Instrumental Activities of Daily Living (IADL)   IADL Comments Pt previously received assistance of IADL completion   Activities of Daily Living Analysis   Impairments Contributing to Impaired Activities of Daily Living cognition impaired;ROM decreased;strength decreased;pain   General Therapy Interventions   Planned Therapy Interventions ADL retraining;ROM;strengthening;progressive activity/exercise;home program guidelines   Clinical Impression   Criteria for Skilled Therapeutic Interventions Met yes, treatment indicated   OT Diagnosis decreased activity tolerance and independence with ADLs   Influenced by the following impairments weakness, fatigue, deconditioning,   Assessment of Occupational Performance 5 or more Performance Deficits   Identified Performance Deficits functional mobility, g/h, eating, dressing, bathing, toileting   Clinical Decision Making (Complexity) Low complexity   Therapy Frequency 5 times/wk   Predicted Duration of Therapy Intervention (days/wks) 11/9/18   Anticipated Equipment Needs at Discharge other (see comments)  (TBD)   Anticipated Discharge Disposition Transitional Care Facility   Risks and Benefits of Treatment have been explained. Yes   Patient, Family & other staff in agreement with plan of care Yes   [facility] AM-PAC TM \"6 Clicks\"   2016, Trustees of [facility], under license to [employer].  All rights reserved.   6 Clicks Short Forms Daily Activity Inpatient Short Form   [facility] AM-PAC  \"6 Clicks\" Daily Activity Inpatient Short Form   1. Putting on and taking off regular lower body clothing? 1 - Total   2. Bathing (including washing, rinsing, drying)? 1 - Total "   3. Toileting, which includes using toilet, bedpan or urinal? 2 - A Lot   4. Putting on and taking off regular upper body clothing? 2 - A Lot   5. Taking care of personal grooming such as brushing teeth? 2 - A Lot   6. Eating meals? 2 - A Lot   Daily Activity Raw Score (Score out of 24.Lower scores equate to lower levels of function) 10

## 2018-11-02 NOTE — PROGRESS NOTES
[facility], [place]    Internal Medicine Progress Note - Gold Service      Assessment & Plan   Mono Varghese is a 48 year old male admitted on 11/1/2018. He has a left parietal astrocytoma s/p resection, Alcoholic cirrhosis, hepatic encephalopathy, rectal and esophageal varices and portal vein thrombosis (not on anticoagulation), hypothyroidism and is admitted for encephalopathy and generalized weakness X 4 days. He was discharged 10/22 after hospitalization for encephalopathy and was found to have EColi bacteremia treated with a 7-day course of Augmentin. Source was presumed to be from recent rectal variceal coiling as he had a negative paracentesis, UA, abdominal US, and a HIDA scan.        Worsening Encephalopathy in patient with Alcoholic cirrhosis c/b refractory ascites, MELD 14 and h/o Esophageal and Rectal Varicies  Presenting with confusion and generalized weakness.  Ammonia 111.  Paracentesis with 82 WBC so not sug for SBP.  UA Neg. CXR clear.  Blood cx neg so far.  CRP 17, procal not elevated.  Inf w/u neg thus far,  Per wife pt compliant with lactulose and having 5 stools/d.  No evid GIB at present.  - [place] Hepatic Encephalopathy protocol initiated  - Speech eval assessed and said okay to eat  - Follow CMP + INR + CBC  - blood cultures pending  - urine cultures pending   - ceftriaxone 2g daily until cx all neg  - continue to monitor for sepsis  - will need a paracentesis soon (last one 1.5 weeks ago and gets every 2 weeks)  -restart home lasix and spironolactone      Cough non-productive cough noted on adm.  No cough since I've sen him, afebrile, CXR clear.   - continue to monitor    Pancytopenia  Hg 11.1, WBC 3.2, Plt 48. Have remained stable. No source of bleeding  - continue to trend  - CMP in AM     -----Chronic Medical Problems------  # h/o Seizures  #Hx of astrocytoma s/p chem, resection, radiation  - continue PTA carbamazepine     #Hypothyrodism  - TSH, free  T4  - continue PTA synthroid     #HLD  - continue PTA pravastatin     #GERD  -continue PTA omeprazole        DVT Prophylaxis: Pneumatic Compression Devices  Code Status: DNR/DNI  Expected discharge: 2 - 3 days, recommended to prior living arrangement once mental status at baseline.    Diet: Combination Diet Regular Diet Adult  Munoz Catheter: not present  Lines: PIV, RIJ port a cath --> present for 5 yrs. Allergic reaction to IV tubing      Uriel Jaquez MD  Internal Medicine Staff Hospitalist Service  [facility]  Pager: [phone]  Please see sticky note for cross cover information    Interval History     Admitted last night with worsening confusion.  Alert with questioning, but nods off easily and can't seem to concentrate enough to really answer questions.  Wife not hear this morning.     ROS: Cant' really conduct due to patients MS    Data reviewed today: I reviewed all medications, new labs and imaging results over the last 24 hours. I personally reviewed no images or EKG's today.    Physical Exam   Vital Signs: Temp: 96  F (35.6  C) Temp src: Axillary BP: 124/86 Pulse: 70 Heart Rate: 93 Resp: 18 SpO2: 97 % O2 Device: None (Room air)    Weight: 188 lbs 4.37 oz  General Appearance: Lying in bed  Respiratory: CTAB  Cardiovascular: RRR without M  GI: Distended, non-tender, +BS  Skin: ecchymosis   Neuro: Sleeping, but awakens easily, will start to answer questions, but loses train of thought.        Data     Recent Labs  Lab 11/02/18  0721 11/01/18  1711   WBC 2.7* 2.7*   HGB 11.0* 11.1*   MCV 95 96   PLT 46* 48*   INR 1.49* 1.51*    136   POTASSIUM 3.5 3.2*   CHLORIDE 110* 107   CO2 19* 22   BUN 15 16   CR 0.96 1.03   ANIONGAP 10 7   UCHE 8.4* 8.2*   * 168*   ALBUMIN 2.8* 2.8*   PROTTOTAL 6.4* 6.4*   BILITOTAL 2.0* 1.6*   ALKPHOS 294* 339*   ALT 29 30   AST 39 37       Recent Results (from the past 24 hour(s))   XR Chest Port 1 View    Narrative    Exam: XR CHEST PORT 1 VW,  11/1/2018 5:34 PM    Indication: fever;     Comparison: Chest radiograph 10/20/2018    Findings:   Supine AP view of the chest. Right IJ Port-A-Cath with the tip in the  low right atrium. Cardiomediastinal silhouette is stable. Pulmonary  vasculature is indistinct. No new airspace opacity, pleural effusion,  or pneumothorax. Bibasilar atelectasis. Upper abdomen is unremarkable.      Impression    Impression: No acute airspace opacity.    I have personally reviewed the examination and initial interpretation  and I agree with the findings.    ELIAS RABAGO MD     Medications     - MEDICATION INSTRUCTIONS -         calcium carbonate 500 mg-vitamin D 200 units  1 tablet Oral Daily     carBAMazepine  200 mg Oral BID     cefTRIAXone  2 g Intravenous Q24H     ciclopirox   Topical BID     cyanocobalamin  1,000 mcg Oral Daily     ferrous sulfate  325 mg Oral BID     folic acid  1 mg Oral Daily     gabapentin  100 mg Oral At Bedtime     lactulose  20 g Oral TID     levothyroxine  88 mcg Oral QAM AC     mirtazapine  45 mg Oral At Bedtime     multivitamin, therapeutic with minerals  1 tablet Oral BID w/meals     omeprazole  40 mg Oral BID AC     pravastatin  80 mg Oral Daily     rifaximin  550 mg Oral BID     thiamine  100 mg Oral Daily

## 2018-11-02 NOTE — TELEPHONE ENCOUNTER
Central Prior Authorization Team   Phone: [phone]    PA Initiation    Medication: Xifaxan 550 mg-PA initiated  Insurance Company: [employer] - Phone [phone] Fax [phone]  Pharmacy Filling the Rx: [facility] [address] [place], [place] - [address] [facility]  Filling Pharmacy Phone: [phone]  Filling Pharmacy Fax: [phone]  Start Date: 11/2/2018

## 2018-11-02 NOTE — PHARMACY-ADMISSION MEDICATION HISTORY
Admission medication history interview status for the 11/1/2018 admission is complete. See Epic admission navigator for allergy information, pharmacy, prior to admission medications and immunization status.     Medication history interview sources:  patient's mother-in-law Faby    Changes made to PTA medication list (reason)  Added: none  Deleted: none  Changed:   -Ciclopirox 0.77% cream: changed from apply topically 2 times daily -> apply topically 2 times daily as needed (patient does not use this 2 times everyday)  -Hydroxyzine 25 mg tablet: take 1 tablet by mouth 2 times daily -> take 1 tablet by mouth 2 times daily as needed (patient takes only when needed)  -Lactulose 10 mg/15 mL solution: take 30 mLs by mouth 3 times daily -> take 60 mLs by mouth daily (patient prefers to take it this way, but still has 3-4 stools each day)    Additional medication history information (including reliability of information, actions taken by pharmacist): Patient's mother-in-law is very comfortable with his medications. She stays with him regularly. She was able to describe the differences reported above. The patient prefers to take all of the lactulose at once, but he still has 3-4 stools per day.      Prior to Admission medications    Medication Sig Last Dose Taking? Auth Provider   Calcium Carb-Cholecalciferol (CALCIUM 500 +D) 500-400 MG-UNIT TABS Take 500 mg by mouth daily 11/1/2018 at Unknown time Yes Beatriz Tanner MD   carBAMazepine (TEGRETOL) 200 MG tablet Take 1 tablet (200 mg) by mouth 2 times daily 11/1/2018 at Unknown time Yes Anil English MD   cyanocobalamin 1000 MCG TABS Take 1 tablet by mouth daily 11/1/2018 at Unknown time Yes Unknown, Entered By History   ferrous sulfate (IRON) 325 (65 FE) MG tablet Take 1 tablet (325 mg) by mouth 2 times daily 11/1/2018 at Unknown time Yes Trevon Henson MD   folic acid (FOLVITE) 1 MG tablet Take 1 tablet (1 mg) by mouth daily 11/1/2018 at Unknown  time Yes Ruby Beyer MD   furosemide (LASIX) 20 MG tablet Take 1 tablet (20 mg) by mouth daily 11/1/2018 at Unknown time Yes Beatriz Tanner MD   gabapentin (NEURONTIN) 100 MG capsule Take 1 capsule (100 mg) by mouth At Bedtime 10/31/2018 Yes Leroy Alcaraz MD   lactulose (CHRONULAC) 10 GM/15ML solution Take 30 mLs (20 g) by mouth 3 times daily  Patient taking differently: Take 60 mLs by mouth daily  11/1/2018 at Unknown time Yes Maribell Bolden MD   levothyroxine (SYNTHROID/LEVOTHROID) 88 MCG tablet Take 1 tablet (88 mcg) by mouth daily 11/1/2018 at Unknown time Yes King Louis MD   methylphenidate (RITALIN) 10 MG tablet Take 1 tablet (10 mg) by mouth 2 times daily 11/1/2018 at Unknown time Yes Leroy Alcaraz MD   mirtazapine (REMERON) 45 MG tablet Take 1 tablet (45 mg) by mouth At Bedtime 10/31/2018 Yes Yessi Danielson MD   multivitamin, therapeutic with minerals (THERA-VIT-M) TABS Take 1 tablet by mouth 2 times daily 11/1/2018 at Unknown time Yes Reported, Patient   omeprazole (PRILOSEC) 20 MG CR capsule Take 2 capsules (40 mg) by mouth 2 times daily 11/1/2018 at Unknown time Yes King Louis MD   pravastatin (PRAVACHOL) 80 MG tablet Take 1 tablet (80 mg) by mouth daily 10/31/2018 Yes King Louis MD   spironolactone (ALDACTONE) 25 MG tablet Take 2 tablets (50 mg) by mouth daily 11/1/2018 at Unknown time Yes Beatriz Tanner MD   thiamine (VITAMIN B-1) 100 MG tablet Take 1 tablet (100 mg) by mouth daily 11/1/2018 at Unknown time Yes King Louis MD   traZODone (DESYREL) 50 MG tablet Take 1 tablet (50 mg) by mouth nightly as needed for sleep 10/31/2018 Yes Leroy Alcaraz MD   Vitamin D, Cholecalciferol, 1000 units TABS Take 1 tablet by mouth daily 11/1/2018 at Unknown time Yes Unknown, Entered By History   XIFAXAN 550 MG TABS tablet TAKE ONE TABLET BY MOUTH TWICE DAILY 11/1/2018 at Unknown time Yes Beatriz Tanner MD   blood glucose  "monitoring (ONETOUCH ULTRA) test strip Use to test blood sugars 4 times daily as needed or as directed. Unknown at Unknown time  OfKing torres MD   CANE, ANY MATERIAL One cane Unknown at Unknown time  OfKing torres MD   ciclopirox (LOPROX) 0.77 % cream Apply topically 2 times daily To feet and toenails.  Patient taking differently: Apply topically 2 times daily as needed To feet and toenails. Unknown at Unknown time  King Quiles DPM   HYDROcodone-acetaminophen (NORCO) 5-325 MG per tablet Take 2 tablets by mouth every 6 hours as needed for moderate to severe pain More than a month at Unknown time  OfKing torres MD   hydrOXYzine (ATARAX) 25 MG tablet Take 1 tablet (25 mg) by mouth 2 times daily  Patient taking differently: Take 25 mg by mouth 2 times daily as needed for itching  More than a month at Unknown time  King Louis MD   lidocaine (LMX4) 4 % CREA cream Apply topically once as needed for mild pain Unknown at Unknown time  OfKing torres MD   ONE TOUCH LANCETS MISC by In Vitro route 4 times daily as needed Unknown at Unknown time  King Louis MD   order for DME Equipment being ordered: manual wheelchair and cushion    [employer] light weight Sx5 18x18, 17.5 inch floor height and a 2 inch 18x18 inch cushion    length of need : lifetime        Patient's height : 5/10\", weight : 172 pounds Unknown at Unknown time  King Louis MD         Medication history completed by: Vidya Nam, PharmD 4 Student      "

## 2018-11-02 NOTE — PROCEDURES
Procedure/Surgery Information   [facility], [place]    Bedside Procedure Note  Date of Service (when I performed the procedure): 11/01/2018    Mono Varghese is a 48 year old male patient.  1. Hepatic encephalopathy (H)      Past Medical History:   Diagnosis Date     ADHD      Alcoholic cirrhosis of liver with ascites (H) 06/17/2015    cirrhosis secondary to alcohol, complicated by variceal bleeding and hepatic encephalopathy      Ascites due to alcoholic cirrhosis (H)     Requiring regular paracentesis.     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 status post subtotal resection by Dr. J Carlos Aranda in early 06/2013. Negative for 1p/19q deletions. S/P Concurrent chemoradiation July 15 through August 23, 2013. Initiated adjuvant oral temozolomide 9/17/13; switch     H/O LENA (obstructive sleep apnea)-moderate 12/18/2012    Pt states this is not currently an issue.     Hyperlipidemia LDL goal <100 8/1/2016     Hypothyroidism 8/1/2016     Liver failure (H)      Moderate major depression (H) 10/3/2012     Obesity      Pancytopenia (H) 8/1/2016    Chronic pancytopenia secondary to liver disease since at least 2012      Partial epilepsy with impairment of consciousness (H) 05/07/2014     Portal  "hypertensive gastropathy (H)      Portal vein thrombosis 12/18/2015    history of left lower extremity deep vein thrombosis as well as portal vein and superior mesenteric vein thrombosis, late 2015 when he was hospitalized in the ICU and seriously ill temporary IVC filter placed in 12/2015 when he was in the ICU with deep vein thromboses and active bleeding      Pulmonary nodules 6/17/2015     Rectal bleeding      Seizures (H)      Type 2 diabetes mellitus (H) 10/3/2012     Temp: 98.8  F (37.1  C) Temp src: Oral BP: 130/90 Pulse: 78 Heart Rate: 78 Resp: 14 SpO2: 100 % O2 Device: None (Room air)      Abdominal paracentesis  Date/Time: 11/1/2018 10:22 PM  Performed by: ALANA LAKHANI  Authorized by: ALANA LAKHANI   Consent: Written consent obtained.  Risks and benefits: risks, benefits and alternatives were discussed  Consent given by: spouse  Patient understanding: patient states understanding of the procedure being performed  Patient consent: the patient's understanding of the procedure matches consent given  Procedure consent: procedure consent matches procedure scheduled  Relevant documents: relevant documents present and verified  Test results: test results available and properly labeled  Site marked: the operative site was marked  Imaging studies: imaging studies available  Patient identity confirmed: arm band  Time out: Immediately prior to procedure a \"time out\" was called to verify the correct patient, procedure, equipment, support staff and site/side marked as required.  Preparation: Patient was prepped and draped in the usual sterile fashion.  Local anesthesia used: yes  Anesthesia: local infiltration    Anesthesia:  Local anesthesia used: yes  Local Anesthetic: lidocaine 1% without epinephrine    Sedation:  Patient sedated: no  Patient tolerance: Patient tolerated the procedure well with no immediate complications  Comments: Diagnostic tap, 10cc of clear yellow fluid obtained, supervised by  " Bryant.           Luis Tanner

## 2018-11-02 NOTE — PROGRESS NOTES
11/02/18 0853   General Information   Onset Date 11/01/18   Start of Care Date 11/02/18   Referring Physician Lilian Dumont MD   Patient Profile Review/OT: Additional Occupational Profile Info See Profile for full history and prior level of function   Patient/Family Goals Statement Pt did not state   Swallowing Evaluation Bedside swallow evaluation   Behaviorial Observations Alert   Mode of current nutrition Oral diet   Type of oral diet Regular;Thin liquid   Respiratory Status O2 Supply   Type of O2 supply Nasal cannula   Comments Mono Varghese is a 48 year old male  who has a history of left parietal astrocytoma s/p resection, Alcoholic cirrhosis, hepatic encephalopathy, rectal and esophageal varices and PVT not on anticoagulation, hypothyroidism and is admitted for acute encephalopathy and generalized weakness that started 4 days ago. Pt well-known to  caseload with frequent admission and previous recommendations for regular diet and thin liquids  on 10/23/18. Clinical swallow eval completed per MD orders to further assess oropharyngeal swallow function.    Clinical Swallow Evaluation   Oral Musculature generally intact   Structural Abnormalities none present   Dentition other (see comments)  (missing some upper/lower molars)   Mucosal Quality adequate   Mandibular Strength and Mobility intact   Laryngeal Function Cough;Swallow;Voicing initiated   Additional Documentation Yes   Clinical Swallow Eval: Thin Liquid Texture Trial   Mode of Presentation, Thin Liquids straw;self-fed   Volume of Liquid or Food Presented 6 oz   Oral Phase of Swallow WFL   Pharyngeal Phase of Swallow intact   Diagnostic Statement Pt tolerated thin liquids via straw with no overt s/sx of aspiration    Clinical Swallow Eval: Solid Food Texture Trial   Mode of Presentation, Solid self-fed   Volume of Solid Food Presented 4 tbsp   Oral Phase, Solid other (see comments)  (prolonged btu functional time for mastication)    Pharyngeal Phase, Solid intact   Diagnostic Statement No overt s/sx of aspiration. Pt required mildly prolonged but functioanl time for mastication. No evidence of oral residuals remaining   VFSS Evaluation   VFSS Additional Documentation No   FEES Evaluation   Additional Documentation No   Swallow Compensations   Swallow Compensations Alternate viscosity of consistencies;Pacing;Reduce amounts;Multiple swallow   Results Oral difficulties only   General Therapy Interventions   Planned Therapy Interventions Dysphagia Treatment   Dysphagia treatment Compensatory strategies for swallowing;Instruction of safe swallow strategies   Swallow Eval: Clinical Impressions   Skilled Criteria for Therapy Intervention Current level of function same as previous level of function   Functional Assessment Scale (FAS) 6   Treatment Diagnosis Oropharyngeal swallow WFL   Diet texture recommendations Regular diet;Thin liquids  (w/intermittent supervision)   Recommended Feeding/Eating Techniques alternate between small bites and sips of food/liquid;check mouth frequently for oral residue/pocketing;hard swallow w/ each bite or sip;maintain upright posture during/after eating for 30 mins;small sips/bites   Demonstrates Need for Referral to Another Service occupational therapy;physical therapy;dietitian   Therapy Frequency other (see comments)  (x1 tx following eval)   Anticipated Discharge Disposition other (see comments)  (defer to PT/OT)   Risks and Benefits of Treatment have been explained. Yes   Patient, family and/or staff in agreement with Plan of Care Yes   Clinical Impression Comments SLP: Clinical swallow eval completed per MD orders. Pts oropharyngeal swallow is WFL. Pt tolerated thin liquids via straw and regular solid textures with no overt s/sx of aspiration. RN administered PO meds which pt tolerated without difficulty with use of liquid wash. Recommend continue regular textures and thin liquids. Pt would benefit from  intermittent supervision with PO intake until improvements in mental status are observed. Pt should be fully upright and alert for all PO, take small sips/bites, pace self, and alternate between consistencies. Suspect pt is at baseline swallow function; will complete orders.    Total Evaluation Time   Total Evaluation Time (Minutes) 10

## 2018-11-02 NOTE — PLAN OF CARE
Problem: Patient Care Overview  Goal: Plan of Care/Patient Progress Review  Discharge Planner SLP   Patient plan for discharge: none stated  Current status: SLP: Clinical swallow eval completed per MD orders. Pts oropharyngeal swallow is WFL. Pt tolerated thin liquids via straw and regular solid textures with no overt s/sx of aspiration. RN administered PO meds which pt tolerated without difficulty with use of liquid wash. Recommend continue regular textures and thin liquids. Pt would benefit from intermittent supervision with PO intake until improvements in mental status are observed. Pt should be fully upright and alert for all PO, take small sips/bites, pace self, and alternate between consistencies. Suspect pt is at baseline swallow function; will complete orders.   Barriers to return to prior living situation: defer to PT/OT  Recommendations for discharge: resume home ST for cog/ling tx   Rationale for recommendations: suspect pt is at baseline swallow function; will complete ST orders.        Entered by: Ne aSab 11/02/2018 9:03 AM

## 2018-11-02 NOTE — PLAN OF CARE
Problem: Patient Care Overview  Goal: Plan of Care/Patient Progress Review  Outcome: No Change  Pt arrived to unit around 2330. Alert, however confused X4; does not remember the name of facility, date, or his own name or birth of date. Calm and cooperative with cares. Started on WH lactulose protocol. Also started on scheduled lactulose. Vitally stable on ra; however appears his breathing is labored. Abdominal distended/taut. Elevation of HOB seemed to have helped ease off labored breathing. No open skin sores on body; has a few bruises and small scratches that have been scabbed over on LLE. Has chest port for IV access; started on IV Rocephin for unresolved E.Coli bacteremia. Chest port needle changed and new dressing applied. C/o of lower abdominal pain with palpation; no complaints at rest. Pt has been coughing intermittently through the night, however unproductive. Denies nausea. Had large BM X2. Admitted with low K+ of 3.2; replacement protocol initiated. Has been restless most of the night. Recommended for possible paracentesis by CC MD today. Will continue to monitor and implement poc.

## 2018-11-02 NOTE — PROGRESS NOTES
Pt holding significant fluid in legs/actually was admitted to hospital yesterday .    Please write DME order for elevating leg rest for his Wheel Chair due to  hepatic failure and leg edema. Pt was 4+ yesterday w/difficulty transferring. [facility]   If questions on how RX should read please call [facility] at  [phone] ask for intake

## 2018-11-02 NOTE — PROGRESS NOTES
[facility]  Patient is currently open to home care services with [facility].  The patient is currently receiving RN/PT/OT/SLP/HA services.  RN performs weekly med set up and general assessment.  [facility]  and team have been notified of patient admission.  [facility] liaison will continue to follow patient during stay.  If appropriate provide orders to resume home care at time of discharge.    Thank you  Sindi Gagnon RN, BSN  [facility] Liaison  [facility]  [phone]

## 2018-11-02 NOTE — PLAN OF CARE
Problem: Patient Care Overview  Goal: Plan of Care/Patient Progress Review  Outcome: No Change  Pt arrived on 5B at 23:15 from [facility] with altered mental status, weakness, confusion, leg swelling, pancytopenia and hepatic encephalopathy.  History of alcoholic cirrhosis with ascites. Paracentesis performed in ER. Ammonia=111. Pt takes lactulose at home.  Recently discharged 10/24/18 on Augmentin for ecoli bacteremia. Incontinent of stools X2 in ER, normally continent at home. Uses walker and wheelchair at home but unable to do so for the past few days due to weakness. Garbled speech at times. Oriented to room and call light. SL port R chest. Port uncovered and needs a dressing-NOC RN aware. Cont to monitor mental status and administer lactulose as ordered.

## 2018-11-02 NOTE — PATIENT INSTRUCTIONS
"Recommendations from today's MTM visit:                                                    MTM (medication therapy management) is a service provided by a clinical pharmacist designed to help you get the most of out of your medicines.   Today we reviewed what your medicines are for, how to know if they are working, that your medicines are safe and how to make your medicine regimen as easy as possible.     1. Ania will connect with primary care to ensure they contact you about next steps for Mono.    2. I reviewed Mono's medication list for major causes of the \"parkinsons like movements\" you described. This movement is most likely caused by medications in the antipsychotic class or anti-nausea class (which share a similar structure). I did not see that Mono is currently on any of these medications. Tremor and lack of muscle coordination has been reported with carbamazepine and gabapentin respectively, in very low amount. He is currently on a very small dose of gabapentin.     These movements may be related to his ammonia level. It is common for patients to have hand movements or tremor with elevated ammonia levels. Please let me know if you have questions about this.    Next MTM visit: will follow-up based on next steps per primary care    To schedule another MTM appointment, please call the clinic directly or you may call the MTM scheduling line at [phone] or toll-free at [phone].     My Clinical Pharmacist's contact information:                                                      It was a pleasure talking with you today!  Please feel free to contact me with any questions or concerns you have.      Ania AbadD   MTM Pharmacist   [facility]ology [facility]  Phone: [phone]    You may receive a survey about the MTM services you received.  I would appreciate your feedback to help me serve you better in the future. Please fill it out and return it when you can. Your comments will be " anonymous.

## 2018-11-02 NOTE — PROGRESS NOTES
Nutrition Note: Received positive risk screen for uncontrolled/New diabetes, unintentional loss of 10# or more in the past two months.     Reviewed wt hx:   Wt Readings from Last 10 Encounters:   11/02/18 88 kg (194 lb 0.1 oz)   10/24/18 77.5 kg (170 lb 12.8 oz)   10/15/18 81.8 kg (180 lb 5.4 oz)   10/03/18 78 kg (172 lb)   10/02/18 78 kg (172 lb)   10/01/18 78.4 kg (172 lb 13.5 oz)   09/17/18 77.2 kg (170 lb 4.8 oz)   09/06/18 80.5 kg (177 lb 7.5 oz)   08/24/18 79.7 kg (175 lb 11.2 oz)   08/01/18 83.4 kg (183 lb 12.8 oz)     --No recent wt loss, per above wt trend.   --Per chart review, Patient with history of DM2, no recent A1C to view.   --No nutrition risk factors identified for above screen at this time. Please re-consult if indicated.    Rick Malik RD/JOSELO  Pager [phone]

## 2018-11-02 NOTE — PLAN OF CARE
Problem: Patient Care Overview  Goal: Plan of Care/Patient Progress Review  PT 5B: order received; per conversation with OT, pt tolerating 1 therapy this date; likely has IP PT needs however. Will reschedule evaluation.

## 2018-11-02 NOTE — PLAN OF CARE
"Problem: Patient Care Overview  Goal: Plan of Care/Patient Progress Review  Outcome: Improving  Assumed cares 0700 to 1930.     /74 (BP Location: Left arm)  Pulse 76  Temp 96.9  F (36.1  C) (Axillary)  Resp 18  Ht 1.778 m (5' 10\")  Wt 88 kg (194 lb 0.1 oz)  SpO2 96%  BMI 27.84 kg/m2     Pain: Declines.  Neuro: Oriented to self and occasionally to place. Slow to respond. Word finding difficulty.  Respiratory: Lung sounds clear and equal on RA.  Cardiac/Neurovascular: HR and pulses WDL. LE edema present.  GI/: Abd distended and taut. Bowel sounds active. 2 incontinent BMs this shift. Adequate UOP, also incontinent.  Nutrition: Ate tray of mixed fruit.   Activity: Turns self in bed with minimal to no assistance.  Skin: Blanchable redness on bottom.  Lines: Port in R chest, saline locked, site WDL.  Events this shift: Pt refused 0800 lactulose. Was able to admin 1 scheduled dose of lactulose this shift. Pt did refuse any additional lactulose protocol doses. Pt started on PO lasix and PO aldactone.     Plan: Continue to monitor and administer lactulose.        "

## 2018-11-02 NOTE — CONSULTS
[facility] ID SERVICE CONSULTATION     Patient:  Mono Varghese   Date of birth 1970, Medical record number [id number]  Date of Visit:  11/02/2018  Date of Admission: 11/1/2018  Consult Requester:Uriel Jaquez MD          Assessment and Recommendations:   ASSESSMENT:   49 y/o M with recent admission for E Coli bacteremia, returns with acute encephalopathy and generalized weakness, ID consulted for concern for recurrent bacteremia.      1. Acute encephalopathy  Several etiologies can be considered at this point, and infection may still be one of them. He had blood cultures prior to antibiotic initiation this admission that have shown no growth, but it might still be early. It would be prudent to wait for further culture incubation while keeping on Ceftriaxone. EColi from his past positive blood culture was susceptible to Ceftriaxone.   Meanwhile other differentials that maybe considered include a more likely hepatic encephalopathy, and a less likely post ictal state, hypothyroid state (TSH high, Free T4 normal) or a return of malignancy.     2. Left parietal astrocytoma s/p resection  3. Alcoholic cirrhosis, with history of hepatic encephalopathy, esophageal and rectal varices, on Thiamine, Spironolactone, Rifaximin, Lactulose  4. Hypothyroidism - TSH high, Free T4 normal, on LT    5. Seizure disorder, on CMZ  6. JOSEF, improving  7. PCP  8. DM    RECOMMENDATIONS:  1. Continue Ceftriaxone.  2. Follow blood cultures    Thank you for asking us to assist in the care of this patient.   Please page if questions. We will continue to follow along.     Plan of care discussed with Dr Maegan Ortiz  PGY4 Infectious Diseases Fellow  Pager: [phone]  ________________________________________________________________  Consult Question:.H/o recent E Coli bacteremia, returning with concern for recurrence.  Admission Diagnosis: Hepatic encephalopathy (H) [K72.90]  Alcoholic cirrhosis of liver  with ascites (H) [K70.31]         History of Present Illness:   Patient was recently discharged after an episode of E Coli bacteremia with a 7 day course of Augmentin, which he completed 4 days ago. After completion, he developed AMS and generalized weakness.   He also had a fever at a 100F, non productive cough, some swelling of his legs. He was taking his Lactulose as prescribed, and having 5-6 BM/day. NH3 was 111 on admission. He received Lactulose in the ED and then has been receiving it after admission, with BMs reported per RN. He is also on Rifaximin. His mental status has overall improved compared to admission, in that he was able to say his name and that he is at [facility], but this also seems to wax and wane per RN. This am, he was answering some questions with a 'yes', but was unable to give further history/say his name. Most of the history was obtained by chart review. He has been on Ceftriaxone per primary team.     Blood cultures have remained NGTD during current admission, including ones drawn prior to initiation of Ceftriaxone. During the previous admission, the source of bacteremia was unclear, with a negative paracentesis, UA, abdominal US and HIDA. Etiology was finally attributed to possibly being related to a recent rectal coiling. Paracentesis was done at this admission as well, and revealed 92 WBCs, with no WBCs or organisms on Gram stain. Urine cultures have been negative as well.         Review of Systems:   Unable to obtain.       Past Medical History:     Past Medical History:   Diagnosis Date     ADHD      Alcoholic cirrhosis of liver with ascites (H) 06/17/2015    cirrhosis secondary to alcohol, complicated by variceal bleeding and hepatic encephalopathy      Ascites due to alcoholic cirrhosis (H)     Requiring regular paracentesis.     Chronic pain 8/1/2016     Depressive disorder 02/01/2001     Encephalopathy, hepatic (H) 7/29/2017     GERD (gastroesophageal reflux  disease)      GIB (gastrointestinal bleeding) 11/23/2015    Admitted to the ICU 11/2015 for hemorrhagic shock 2/2 variceal bleed with subsequent sequelae of shock liver, multi organ dysfunction including altered mental status of unknown etiology, multiple thrombosis, decompensated liver cirrhosis, hematologic abnormalities, and JOSEF s/p banding at the end of 03/2016      H/O Oligoastrocytoma of parietal lobe (H) 06/11/2013    Presented acute onset of right-sided seizures in 4/2013. 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 status post subtotal resection by Dr. J Carlos Aranda in early 06/2013. Negative for 1p/19q deletions. S/P Concurrent chemoradiation July 15 through August 23, 2013. Initiated adjuvant oral temozolomide 9/17/13; switch     H/O LENA (obstructive sleep apnea)-moderate 12/18/2012    Pt states this is not currently an issue.     Hyperlipidemia LDL goal <100 8/1/2016     Hypothyroidism 8/1/2016     Liver failure (H)      Moderate major depression (H) 10/3/2012     Obesity      Pancytopenia (H) 8/1/2016    Chronic pancytopenia secondary to liver disease since at least 2012      Partial epilepsy with impairment of consciousness (H) 05/07/2014     Portal hypertensive gastropathy (H)      Portal vein thrombosis 12/18/2015    history of left lower extremity deep vein thrombosis as well as portal vein and superior mesenteric vein thrombosis, late 2015 when he was hospitalized in the ICU and seriously ill temporary IVC filter placed in 12/2015 when he was in the ICU with deep vein thromboses and active bleeding      Pulmonary nodules 6/17/2015     Rectal bleeding      Seizures (H)      Type 2 diabetes mellitus (H) 10/3/2012          Past Surgical History:     Past Surgical History:   Procedure Laterality Date     COLONOSCOPY N/A 11/27/2015    Procedure: COMBINED COLONOSCOPY, SINGLE OR MULTIPLE BIOPSY/POLYPECTOMY BY BIOPSY;   Surgeon: Ran Thurston MD;  Location: [facility]     ENDOSCOPIC ULTRASOUND LOWER GASTROINTESTIONAL TRACT (GI) N/A 10/15/2018    Procedure: Rectal Endoscopic Ultrasound **Latex Allergy** with coiling and glueing of rectal varices;  Surgeon: Guru Jose Kelly MD;  Location: [facility] OR     ENDOSCOPIC ULTRASOUND UPPER GASTROINTESTINAL TRACT (GI) N/A 10/1/2018    Procedure: ENDOSCOPIC ULTRASOUND, ESOPHAGOSCOPY / UPPER GASTROINTESTINAL TRACT (GI);;  Surgeon: Guru Jose Kelly MD;  [facility]: [facility]     ESOPHAGOSCOPY, GASTROSCOPY, DUODENOSCOPY (EGD), COMBINED N/A 11/23/2015    Procedure: COMBINED ESOPHAGOSCOPY, GASTROSCOPY, DUODENOSCOPY (EGD);  Surgeon: Rocael Rizvi MD;  [facility]: [facility]     ESOPHAGOSCOPY, GASTROSCOPY, DUODENOSCOPY (EGD), COMBINED N/A 1/25/2017    Procedure: COMBINED ESOPHAGOSCOPY, GASTROSCOPY, DUODENOSCOPY (EGD), BIOPSY SINGLE OR MULTIPLE;  Surgeon: Rocael Tejada MD;  [facility]: [facility]     ESOPHAGOSCOPY, GASTROSCOPY, DUODENOSCOPY (EGD), COMBINED N/A 3/30/2017    Procedure: COMBINED ESOPHAGOSCOPY, GASTROSCOPY, DUODENOSCOPY (EGD);  Surgeon: Jordana Ramirez MD;  [facility]: [facility]     ESOPHAGOSCOPY, GASTROSCOPY, DUODENOSCOPY (EGD), COMBINED N/A 1/19/2018    Procedure: COMBINED ESOPHAGOSCOPY, GASTROSCOPY, DUODENOSCOPY (EGD);  Upper Endoscopy with verceal banding; Flexible Sigmoidoscopy;  Surgeon: Guru Jose Kelly MD;  [facility]     ESOPHAGOSCOPY, GASTROSCOPY, DUODENOSCOPY (EGD), COMBINED N/A 2/12/2018    Procedure: COMBINED ESOPHAGOSCOPY, GASTROSCOPY, DUODENOSCOPY (EGD);  Esophagogastroduodenoscopy with variceal banding;  Surgeon: Guru Jose Kelly MD;  [facility]     ESOPHAGOSCOPY, GASTROSCOPY, DUODENOSCOPY (EGD), COMBINED N/A 3/5/2018    Procedure: COMBINED ESOPHAGOSCOPY, GASTROSCOPY, DUODENOSCOPY (EGD);  Esophagogastroduodenoscopy  with banding of varices Latex Allergy ;  Surgeon: Guru Robin  Jose Langley MD;  Location:  OR     ESOPHAGOSCOPY, GASTROSCOPY, DUODENOSCOPY (EGD), COMBINED N/A 4/16/2018    Procedure: COMBINED ESOPHAGOSCOPY, GASTROSCOPY, DUODENOSCOPY (EGD);  Upper Endoscopy  with esophageal varices banding; Latex Allergy ;  Surgeon: Guru Jose Kelly MD;  [facility]     ESOPHAGOSCOPY, GASTROSCOPY, DUODENOSCOPY (EGD), COMBINED N/A 5/30/2018    Procedure: COMBINED ESOPHAGOSCOPY, GASTROSCOPY, DUODENOSCOPY (EGD);  upper endoscopy with banding of esophageal varices. *latex Allergy*;  Surgeon: Guru Jose Kelly MD;  Location:  OR     OPTICAL TRACKING SYSTEM CRANIOTOMY, EXCISE TUMOR, COMBINED  6/6/2013    Procedure: COMBINED OPTICAL TRACKING SYSTEM CRANIOTOMY, EXCISE TUMOR;  Left Stealth Guided Craniotomy , Tumor Resection ;  Surgeon: J Carlos Aranda MD;  Location:  OR     ORTHOPEDIC SURGERY      hand surgery- right     SIGMOIDOSCOPY FLEXIBLE N/A 11/24/2015    Procedure: SIGMOIDOSCOPY FLEXIBLE;  Surgeon: Rocael Rizvi MD;  Location:  GI     SIGMOIDOSCOPY FLEXIBLE N/A 1/19/2018    Procedure: SIGMOIDOSCOPY FLEXIBLE;;  Surgeon: Guru Jose Kelly MD;  Location:  OR     SIGMOIDOSCOPY FLEXIBLE N/A 10/1/2018    Procedure: SIGMOIDOSCOPY FLEXIBLE;;  Surgeon: Guru Jose Kelly MD;  Location:  OR            Family History:     Family History   Problem Relation Age of Onset     Adopted: Yes     Medical History Unknown Mother      Cirrhosis Father      Medical History Unknown Brother      Medical History Unknown Brother      Medical History Unknown Brother             Social History:     Social History   Substance Use Topics     Smoking status: Never Smoker     Smokeless tobacco: Never Used     Alcohol use No      Comment: Quit 01/2014     History   Sexual Activity     Sexual activity: Not on file            Current Medications (antimicrobials listed in bold):       calcium carbonate 500 mg-vitamin D 200 units   1 tablet Oral Daily     carBAMazepine  200 mg Oral BID     cefTRIAXone  2 g Intravenous Q24H     ciclopirox   Topical BID     cyanocobalamin  1,000 mcg Oral Daily     ferrous sulfate  325 mg Oral BID     folic acid  1 mg Oral Daily     furosemide  20 mg Oral Daily     gabapentin  100 mg Oral At Bedtime     lactulose  20 g Oral TID     levothyroxine  88 mcg Oral QAM AC     mirtazapine  45 mg Oral At Bedtime     multivitamin, therapeutic with minerals  1 tablet Oral BID w/meals     omeprazole  40 mg Oral BID AC     pravastatin  80 mg Oral Daily     rifaximin  550 mg Oral BID     [START ON 11/3/2018] spironolactone  50 mg Oral Daily     thiamine  100 mg Oral Daily            Allergies:     Allergies   Allergen Reactions     Latex Itching and Rash     No Clinical Screening - See Comments      Coban and Surgilast cause itching     Tegaderm Transparent Dressing (Informational Only)             Physical Exam:   Vitals were reviewed  Patient Vitals for the past 24 hrs:   BP Temp Temp src Pulse Heart Rate Resp SpO2 Height Weight   11/02/18 1543 - - - - - - - - 88 kg (194 lb 0.1 oz)   11/02/18 1417 126/74 96.9  F (36.1  C) Axillary 76 - 18 96 % - -   11/02/18 1104 124/86 96  F (35.6  C) Axillary 70 - 18 97 % - -   11/02/18 0746 142/87 95.8  F (35.4  C) Axillary 78 - 18 95 % - -   11/02/18 0505 136/90 98.4  F (36.9  C) Oral - 93 18 94 % - -   11/01/18 2332 - - - - - - - - 85.4 kg (188 lb 4.4 oz)   11/01/18 2330 (!) 124/92 98.8  F (37.1  C) Oral 77 - 20 98 % - -   11/01/18 2300 (!) 130/92 - - - 82 - 98 % - -   11/01/18 2215 - - - - 78 14 100 % - -   11/01/18 2200 130/90 - - - 79 14 (!) 86 % - -   11/01/18 2145 - - - - 84 13 - - -   11/01/18 2130 (!) 127/92 - - - 74 14 - - -   11/01/18 2100 (!) 135/100 - - - - - - - -   11/01/18 2045 - - - - 81 - - - -   11/01/18 2030 129/83 - - - 72 - - - -   11/01/18 2000 114/82 - - - - - - - -   11/01/18 1930 117/89 - - - - - - - -   11/01/18 1900 115/85 - - - 72 - - - -   11/01/18 1830 (!)  "139/103 - - - 82 19 - - -   11/01/18 1800 110/80 - - - - - - - -   11/01/18 1745 - - - - 75 - 98 % - -   11/01/18 1730 (!) 131/93 - - - 79 24 98 % - -   11/01/18 1715 - - - - 82 17 - - -   11/01/18 1713 139/74 98.8  F (37.1  C) Oral 78 - 14 96 % 1.778 m (5' 10\") 77.1 kg (170 lb)     Ranges for his vital signs:  Temp:  [95.8  F (35.4  C)-98.8  F (37.1  C)] 96.9  F (36.1  C)  Pulse:  [70-78] 76  Heart Rate:  [72-93] 93  Resp:  [13-24] 18  BP: (110-142)/() 126/74  SpO2:  [86 %-100 %] 96 %    Physical Examination:  GENERAL:  Lethargic, unable to answer questions   HEENT:  Head is normocephalic, atraumatic   EYES:  Eyes have anicteric sclerae without conjunctival injection or stigmata of endocarditis.    NECK:  Supple. No  Cervical lymphadenopathy  LUNGS:  Clear to auscultation bilateral.   CARDIOVASCULAR:  Regular rate and rhythm with no murmurs, gallops or rubs.+3 pitting pedal edema B/L  ABDOMEN:  Tense, distended, nt, BS+, dilated abdominal veins+. No spider naevi.   SKIN:  No acute rashes.  No stigmata of endocarditis. Port on right chest, with no surrounding erythema, warmth or ttp.  NEUROLOGIC:  A&ox0-1. Grossly nonfocal. Active x4 extremities         Laboratory Data:     Inflammatory Markers    Recent Labs   Lab Test  11/01/18   1711  08/30/13   1350   SED   --   19*   CRP  17.0*  7.3       Hematology Studies  Recent Labs   Lab Test  11/02/18   0721  11/01/18   1711  10/24/18   0555  10/23/18   0641  10/22/18   0345  10/21/18   0409   10/18/18   1430   10/03/18   0445   09/26/18   1625   09/14/18   1157   WBC  2.7*  2.7*  1.8*  1.9*  1.8*  4.3   < >  1.9*   < >  2.1*   < >  2.4*   < >  2.1*   ANEU   --   2.0   --   1.0*   --    --    --   1.3*   --   1.4*   --   1.7   --   1.4*   AEOS   --   0.1   --   0.2   --    --    --   0.1   --   0.1   --   0.2   --   0.2   HGB  11.0*  11.1*  10.0*  10.3*  10.5*  10.9*   < >  11.2*   < >  11.5*   < >  13.2*   < >  13.0*   MCV  95  96  98  98  98  96   < >  96   < >  " 99   < >  98   < >  95   PLT  46*  48*  33*  32*  27*  29*   < >  37*   < >  33*   < >  41*   < >  42*    < > = values in this interval not displayed.       Immune Globulin Studies  Recent Labs   Lab Test  12/06/15   0349   IGG  1070       Metabolic Studies   Recent Labs   Lab Test  11/02/18   0721  11/01/18   1711  10/24/18   0555  10/23/18   1822  10/23/18   0641  10/22/18   0345   NA  139  136  139   --   139  136   POTASSIUM  3.5  3.2*  3.4  3.4  3.2*  3.1*   CHLORIDE  110*  107  108   --   106  105   CO2  19*  22  23   --   23  25   BUN  15  16  21   --   23  24   CR  0.96  1.03  0.80   --   0.92  0.98   GFRESTIMATED  83  76  >90   --   87  81       Hepatic Studies  Recent Labs   Lab Test  11/02/18   0721  11/01/18   1711  10/24/18   0555  10/23/18   1340  10/23/18   0641  10/22/18   0345  10/21/18   0409   BILITOTAL  2.0*  1.6*  1.2   --   1.6*  2.0*  3.9*   ALKPHOS  294*  339*  263*   --   254*  272*  302*   ALBUMIN  2.8*  2.8*  2.8*  2.6*  2.4*  2.5*  2.6*   AST  39  37  41   --   52*  60*  60*   ALT  29  30  27   --   30  32  33       Thyroid Studies    Recent Labs   Lab Test  11/01/18   1711  09/26/18   1625   TSH  5.65*  6.22*   T4  0.90  1.11     Microbiology:  Culture Micro   Date Value Ref Range Status   11/02/2018 No growth after 10 hours  Preliminary   11/02/2018 No growth after 10 hours  Preliminary   11/01/2018 Culture negative monitoring continues  Preliminary   11/01/2018 Culture negative monitoring continues  Preliminary   11/01/2018 No growth after 17 hours  Preliminary   11/01/2018 Culture negative < 24 hours, reincubate  Preliminary   11/01/2018 No growth after 17 hours  Preliminary   10/23/2018 No growth  Final   10/22/2018 No growth  Final   10/21/2018 No growth  Final     Urine Studies    Recent Labs   Lab Test  11/01/18   1712  10/21/18   0943  10/18/18   1450  10/03/18   0446  09/15/18   1325   LEUKEST  Negative  Negative  Negative  Negative  Negative   WBCU  2  6*  1  1  3        Vancomycin Levels  Recent Labs   Lab Test  11/29/15   0954  11/29/15   0012  11/26/15   1318   VANCOMYCIN  22.5  28.3*  18.8     Virology:  CMV viral loads    Recent Labs   Lab Test  11/24/15   1541   CSPEC  EDTA PLASMA     Hepatitis B Testing Recent Labs   Lab Test  11/25/15   0400  08/25/15   0906   HBCAB   --   Nonreactive   HEPBANG  Nonreactive   --

## 2018-11-03 NOTE — PLAN OF CARE
"Problem: Patient Care Overview  Goal: Plan of Care/Patient Progress Review  Outcome: Improving  Blood pressure 146/81, pulse 77, temperature 96.1  F (35.6  C), temperature source Axillary, resp. rate 20, height 1.778 m (5' 10\"), weight 88 kg (194 lb 0.1 oz), SpO2 96 %.    Pain: Denies. With exception of R superior, lateral foot. States constant pain but refused med interventions and stated the pain is tolerable and \"Doesn't bother me.\"  Neuro: Alert. Oriented to self and place. At 0850, spontaneously stated \"It's 9 o'clock.\" More alert and oriented at 1200. Disoriented x4 at 1355.  Respiratory: Non productive cough in AM. Pt stated it was pt's first time able to cough.  Cardiac/Neurovascular: LE edema present. Pt stated tingling/numbness in LE in the morning while pneumo compressions were on. Denied tingling/numbness in the early afternoon.  Nutrition: Appetite increased at lunch time. Ate tray of fruits. Refuses to drink water.  Activity: Pt able to position self independently with little to no assistance.  GI/: Urine and stool incontinent. Voided 100 mL red colored urine. MD ordered UA, clean catch. Stool occurrence x2. Abd distended and taut.   Skin: Red blanchable skin on bottom. Pt expressed itchiness on bottom. Applied moisturizing cream at 0850.  Lines: Port a Cath in R chest. Saline locked. Site WDL.  Events this shift: Mother-in-law requested all medical procedures to be reviewed with her or with wife (Power of ). Phone number written on white board. Message verbally communicated with MD at 1353.    Plans: Retrieve clean catch urine for UA per MD order. Clean urinal at bedside as alternative method. Continue promoting fluid intake.          "

## 2018-11-03 NOTE — PROGRESS NOTES
11/03/18 1030   Quick Adds   Type of Visit Initial PT Evaluation   Living Environment   Lives With spouse   Living Arrangements house   Home Accessibility bath not on first floor;bed not on first floor   Number of Stairs to Enter Home 0   Number of Stairs Within Home 17   Stair Railings at Home inside, present on left side   Transportation Available family or friend will provide   Living Environment Comment Wife works full time, mother in law at home for assistance during week from 9am-7pm, nurse 1 day per week for medications, aide 1-2x per week for bathing, PT/OT/Speech 1x per week.    Self-Care   Dominant Hand ambidextrous   Usual Activity Tolerance fair   Current Activity Tolerance poor   Regular Exercise yes   Activity/Exercise Type (OT/PT)   Exercise Amount/Frequency 2 times/wk   Equipment Currently Used at Home wheelchair, manual;walker, standard;cane, straight;shower chair;grab bar   Activity/Exercise/Self-Care Comment Pt in W/C at home. Will transfer from bed to W/C with A x 1.   Functional Level Prior   Ambulation 3-->assistive equipment and person   Transferring 3-->assistive equipment and person   Toileting 3-->assistive equipment and person   Bathing 3-->assistive equipment and person   Dressing 2-->assistive person   Eating 0-->independent   Communication 2-->difficulty understanding (not related to language barrier)   Swallowing 0-->swallows foods/liquids without difficulty   Cognition 1 - attention or memory deficits   Fall history within last six months yes   Number of times patient has fallen within last six months (25-30, less since W/C use)   Which of the above functional risks had a recent onset or change? transferring;ambulation   Prior Functional Level Comment Mother in law reports that patient typically using W/C at home and transfers from bed or toilet to W/C with assist of 1. Patient will push W/C at times otherwise needs assistance when on carpeting and doorways for navigate. Details  obtained from mother in law as pt is poor historian.    General Information   Onset of Illness/Injury or Date of Surgery - Date 11/01/18   Referring Physician Lilian Dumont MD    Patient/Family Goals Statement Safe navigation of Stairs   Pertinent History of Current Problem (include personal factors and/or comorbidities that impact the POC) Mono Varghese is a 48 year old male admitted on 11/1/2018. He has a left parietal astrocytoma s/p resection, Alcoholic cirrhosis, hepatic encephalopathy, rectal and esophageal varices and PVT not on anticoagulation, hypothyroidism and is admitted for acute encephalopathy and generalized weakness that started 4 days ago.   Precautions/Limitations fall precautions   Weight-Bearing Status - LUE full weight-bearing   Weight-Bearing Status - RUE full weight-bearing   Weight-Bearing Status - LLE full weight-bearing   Weight-Bearing Status - RLE full weight-bearing   General Info Comments ambulate with assist   Cognitive Status Examination   Orientation orientation to person, place and time   Level of Consciousness confused;lethargic/somnolent   Follows Commands and Answers Questions 75% of the time   Personal Safety and Judgment impaired   Memory impaired   Cognitive Comment Pt unable to recall home enviroment details and level of assistance at home.    Pain Assessment   Patient Currently in Pain No   Integumentary/Edema   Integumentary/Edema no deficits were identifed   Posture    Posture Protracted shoulders;Forward head position   Range of Motion (ROM)   ROM Comment BLE WFL   Strength   Strength Comments BLE >3+/5   Bed Mobility   Bed Mobility Comments Pt mod I during supine to sit and scooting to EOB.   Transfer Skills   Transfer Comments Pt requires CGA during sit to stand transfer.    Gait   Gait Comments Pt ambulates with FWW and CGA demonstrating flexed posture and slow gait.    Balance   Balance Comments Pt able to stand statically without LOB.    Sensory  "Examination   Sensory Perception no deficits were identified   General Therapy Interventions   Planned Therapy Interventions balance training;gait training;neuromuscular re-education;strengthening;home program guidelines   Clinical Impression   Criteria for Skilled Therapeutic Intervention yes, treatment indicated   PT Diagnosis Decreased functional mobility   Influenced by the following impairments weakness, fatigue, impaired balance   Functional limitations due to impairments ambulation, transfers, bed mobility   Clinical Presentation Stable/Uncomplicated   Clinical Presentation Rationale Patient presentation, clinical decision making   Clinical Decision Making (Complexity) Low complexity   Therapy Frequency` 5 times/week   Predicted Duration of Therapy Intervention (days/wks) 1 week   Anticipated Equipment Needs at Discharge (None)   Anticipated Discharge Disposition Transitional Care Facility;Home with Assist   Risk & Benefits of therapy have been explained Yes   Patient, Family & other staff in agreement with plan of care Yes   Clinical Impression Comments Pt below baseline with reduced functional mobility   [facility] TM \"6 Clicks\"   2016, Trustees of [facility], under license to [employer].  All rights reserved.   6 Clicks Short Forms Basic Mobility Inpatient Short Form   [facility]  \"6 Clicks\" V.2 Basic Mobility Inpatient Short Form   1. Turning from your back to your side while in a flat bed without using bedrails? 4 - None   2. Moving from lying on your back to sitting on the side of a flat bed without using bedrails? 4 - None   3. Moving to and from a bed to a chair (including a wheelchair)? 4 - None   4. Standing up from a chair using your arms (e.g., wheelchair, or bedside chair)? 3 - A Little   5. To walk in hospital room? 3 - A Little   6. Climbing 3-5 steps with a railing? 2 - A Lot   Basic Mobility Raw Score (Score out of 24.Lower scores equate to lower levels of " function) 20   Total Evaluation Time   Total Evaluation Time (Minutes) 10

## 2018-11-03 NOTE — PROGRESS NOTES
[facility] ID SERVICE: ONGOING CONSULTATION   Mono Varghese : 1970 Sex: male:   Medical record number [id number] Attending Physician: No att. providers found  Date of Service: November 3, 2018  PROBLEM LIST:   1. Acute encephalopathy and malaise, onset within 2 days of stopping augmentin which had concluded a 7 day course of therapy for E coli bacteremia detected on 10/22 and theorized to have originated from a coiling procedure for rectal varices. While bl cx remain negative, his improvement is fairly marked and antimicrobials are basically the only new intervention  2. ETOh cirrhosis requiring frequent paracenteses, c/b HSE, esophageal and rectal varices  3. L parietal astrocytoma s/p resection  4. Hypothyroidism  5. DM  RECOMMENDATIONS:   1. Continue ceftriaxone. At this point I would commit him to a 2 week course of therapy. I discussed with him and his mother-in-law and primary care-giver that if coverage is lacking for home infusions then he would need to visit an infusion center. Quinolones not an option due to 10/22 isolate being resistant to quinolones    ID will follow with you. Note that Dr. Pete Jackson will cover tomorrow if there are questions, I will return on Monday.  Maegan Manuel MD   of Medicine, Division of Infectious Diseases  [facility] [phone]    INTERVAL HISTORY: (Extended HPI requires four HPI elements or the status of three chronic problems)  More interactive and conversational today. Some abd discomfort due to distension. He denied cough but mother-in-law reports it is present and she attributes to his ascites and abd distention.   ROS: (Recommend ? 2systems)   A five-point review of systems was obtained and was negative with the exception of that which is described above.  Allergies   Allergen Reactions     Latex Itching and Rash     No Clinical Screening - See Comments      Coban and Surgilast cause itching     Tegaderm Transparent Dressing  "(Informational Only)      Allergies were reviewed.  No current outpatient prescriptions on file.     CURRENT ANTI-INFECTIVES:   ceftriaxone  EXAMINATION: (Recommend at least 12 bullets from any organ systems)   Vital Signs: /81 (BP Location: Left arm)  Pulse 77  Temp 96.1  F (35.6  C) (Axillary)  Resp 20  Ht 1.778 m (5' 10\")  Wt 88 kg (194 lb 0.1 oz)  SpO2 96%  BMI 27.84 kg/m2   Awake, alert  Speech slightly slowed, but sensical  HR regular  Breathing normal, he did not cough during my exam, lungs anteriorly clear  Jaundice  Mild LE edema  NEW DATA/RESULTS:   All interval basic labs, microbiology results and imaging were personally reviewed.  Reviewed medicine test (PFTs, EKG, cardiac echo or cath): NO    Culture Micro   Date Value Ref Range Status   11/02/2018 No growth after 1 day  Preliminary   11/02/2018 No growth after 1 day  Preliminary   11/01/2018 Culture negative monitoring continues  Preliminary   11/01/2018 Culture negative monitoring continues  Preliminary   11/01/2018 No growth after 2 days  Preliminary       Recent Labs   Lab Test  11/01/18   1711 08/30/13   1350   CRP  17.0*  7.3     Recent Labs   Lab Test  11/03/18   0538  11/02/18   0721  11/01/18   1711  10/24/18   0555  10/23/18   0641  10/22/18   0345   WBC  2.4*  2.7*  2.7*  1.8*  1.9*  1.8*     Recent Labs   Lab Test  11/03/18   0538  11/02/18   0721  11/01/18   1711  10/24/18   0555   CR  1.01  0.96  1.03  0.80   GFRESTIMATED  78  83  76  >90       Hematology Studies  Recent Labs   Lab Test  11/03/18   0538  11/02/18   0721  11/01/18   1711  10/24/18   0555  10/23/18   0641  10/22/18   0345   10/18/18   1430   10/03/18   0445   09/26/18   1625   09/14/18   1157   WBC  2.4*  2.7*  2.7*  1.8*  1.9*  1.8*   < >  1.9*   < >  2.1*   < >  2.4*   < >  2.1*   ANEU   --    --   2.0   --   1.0*   --    --   1.3*   --   1.4*   --   1.7   --   1.4*   AEOS   --    --   0.1   --   0.2   --    --   0.1   --   0.1   --   0.2   --   0.2   HCT  " 31.2*  32.4*  33.0*  30.2*  30.9*  31.4*   < >  33.0*   < >  34.7*   < >  39.4*   < >  38.5*   PLT  42*  46*  48*  33*  32*  27*   < >  37*   < >  33*   < >  41*   < >  42*    < > = values in this interval not displayed.       Metabolic  Recent Labs   Lab Test  11/03/18   0538  11/02/18   0721  11/01/18   1711   NA  139  139  136   BUN  18  15  16   CO2  20  19*  22   CR  1.01  0.96  1.03   GFRESTIMATED  78  83  76       Hepatic Studies  Recent Labs   Lab Test  11/03/18   0538  11/02/18   0721  11/01/18   1711   BILITOTAL  1.6*  2.0*  1.6*   ALKPHOS  301*  294*  339*   ALBUMIN  2.7*  2.8*  2.8*   AST  37  39  37   ALT  25  29  30       Immunologlobulins  Recent Labs   Lab Test  12/06/15   0349  08/30/13   1350   IGG  1070   --    SED   --   19*       IMAGING RESULTS  The following imaging studies were independently reviewed:    none

## 2018-11-03 NOTE — PLAN OF CARE
"Problem: Patient Care Overview  Goal: Plan of Care/Patient Progress Review  Outcome: Improving  Assumed cares 0700 to 1900.    /82 (BP Location: Left arm)  Pulse 77  Temp 98.4  F (36.9  C) (Oral)  Resp 18  Ht 1.778 m (5' 10\")  Wt 87.5 kg (192 lb 14.4 oz)  SpO2 97%  BMI 27.68 kg/m2    Pain: Declines.  Neuro: Oriented to self. Slow to respond. Word finding difficulty. Neuro status slightly improved today however--able to hold better conversation today.  Respiratory: Lung sounds clear and equal on RA.  Cardiac/Neurovascular: HR and pulses WDL. LE edema present.  GI/: Abd distended and taut. Bowel sounds active. 3 incontinent BMs this shift. Stool sample sent for cdiff testing. Adequate UOP, also incontinent (aside from one occurrence of partial continence). Urine red-tinged. Urine sample sent for UA.  Nutrition: Ate 25% breakfast. Later ate 75% tray of mixed fruit. Working on dinner that wife brought.  Activity: Turns self in bed with minimal to no assistance. Out of bed today with PT.  Skin: Pale. Blanchable redness on bottom.  Lines: Port in R chest, infusing, site WDL.  Events this shift: Pt refused any PRN lactulose protocol doses, did take all scheduled doses this shift. Para today. LE and abd US completed. Albumin ordered for pt, infusing now.     Plan: Continue to monitor.         "

## 2018-11-03 NOTE — PROCEDURES
Hospitalist Procedure Service - Paracentesis Procedure Note  Date of Service: 11/03/2018    Patient: Mono Varghese  MRN: [id number]  Admission Date: 11/1/2018  Hospital Day # 2    Attending: Mony Vu MD  Resident: Little Perla MD  Procedure: Diagnostic and therapeutic paracentesis  Indication: Symptomatic abdominal distension  Pre-procedure diagnosis: Ascites  Post-procedure diagnosis: Same    The risks and benefits of the procedure were explained to Mono and his mother in law Faby (Mono was not able to consent due to mental status at the time of procedure) who expressed understanding and opted to proceed.  Consent was obtained and placed in the chart.  Ultrasound was used to find a pocket of ascites in the left lower quadrant. A time out was performed. The area was prepped and draped in the usual sterile fashion.  10 ml of 1% lidocaine was instilled and ascites located.  A small incision was made with an 11 blade.  The Corso12 paracentesis catheter and needle were inserted until ascites obtained then the needle removed.  The apparatus was connected to vacuum bottles and a total of 5000 ml removed.   A specimen was sent for analysis. The catheter was withdrawn and the area dressed. Pre and post-procedure images were saved to the medical record.    Patient tolerated the procedure well with no immediate complications.  The primary team was informed of the procedure.     Mony Vu MD  Med-Peds Hospitalist  Pager [phone]

## 2018-11-03 NOTE — PLAN OF CARE
Problem: Patient Care Overview  Goal: Plan of Care/Patient Progress Review  Discharge Planner PT 5B  Patient plan for discharge: Home with 24 hr assist  Current status: Pt below functional baseline with cognitive impairments. Pt completes supine to sit and scooting to EOB with mod I at slowed pace. Pt completes sit to stand transfer from raised bed to FWW with CGA. Pt ambulated ~30ft and ~15ft with FWW and CGA. Pt independent during pericares and requiring dep assist to don/doff brief. PT session limited by fatigue.   Barriers to return to prior living situation: level of assist, stairs, fatigue  Recommendations for discharge: TCU   Rationale for recommendations: Navigation of 17 stairs required in order to reach bedroom and bathroom. At this time patient does not demonstrate ability to perform 17 stairs and would benefit from continued rehab at TCU to show safe ambulation with stairs and promote return to baseline mobility. With continued IP PT patient may demonstrate safe navigation of stairs and therefore Home with 24hr assistance will be appropriate upon discharge.       Entered by: Mono Larsen 11/03/2018 11:10 AM

## 2018-11-03 NOTE — PROGRESS NOTES
[facility] [place]    Internal Medicine Progress Note - Maroon1 Service    Main plans for today:  -Therapeutic paracentesis   - Right LE US with doppler; r/o DVT  - Repeat UA  -Check free T3  - Albumin IV after para  -Abdominal xray    - NPO    Assessment & Plan   Mono Varghese is a 48 year old male admitted on 11/1/2018. He has history of  left parietal astrocytoma s/p resection, alcoholic cirrhosis, hepatic encephalopathy, rectal and esophageal varices and portal vein thrombosis (not on anticoagulation), hypothyroidism and is admitted for encephalopathy and generalized weakness X 4 days. He was recently hospitalized for encephalopathy and EColi bacteremia presumed to be secondary to recent rectal variceal coiling  and discharged 10/22 on 7-day course of Augmentin.     # Hepatic Encephalopathy   # Alcoholic cirrhosis c/b refractory ascites,     # h/o Esophageal and Rectal Varices  # abdominal distension  MELD-Na score: 13 at 11/3/2018  5:38 AM  MELD score: 13 at 11/3/2018  5:38 AM  Presenting with confusion and generalized weakness.  Ammonia 111.  Per wife pt compliant with lactulose and having 5 stools/d. Admission paracentesis neg SBP. UA Neg. CXR clear.  Blood cx NGTD.  CRP 17, procal not elevated.  Abdominal distension noted on physical exam. Will obtain abdominal xray to r/o obstruction.   - [place] Hepatic Encephalopathy protocol initiated  - Speech eval assessed and said okay to eat  - blood cultures pending  - urine cultures pending   - ceftriaxone 2g daily until cx all neg  - continue to monitor for sepsis  - Therapeutic para 11/3  - Continue home lasix and spironolactone  - abdominal doppler US    # unilateral RLE swelling  Bilateral dorsalis pedis pulses intact. Extremities WWP on exam. Patient complains of pain in left leg, although right leg appears edematous. Will obtain doppler US of RLE r/o DVT.   - RLE doppler US pending     #Hematuria  Witnessed episode of gross  hematuria on 11/3, patient HD stable with Hgb 10.4 . Calcium oxalate crystals noted in UA. Possible nephrolithiasis.  - Will repeat UA and reassess.  - Renal US    #Cough  Non-productive cough noted on adm. No cough during exam as of 11/3  - continue to monitor    #Pancytopenia  Hg 11.1, WBC 3.2, Plt 48. Have remained stable. No source of bleeding  - continue to trend  - CMP in AM     -----Chronic Medical Problems------  # h/o Seizures  #Hx of astrocytoma s/p chem, resection, radiation  - continue PTA carbamazepine     #Hypothyrodism  -Check free T3  - continue PTA synthroid     #HLD  - continue PTA pravastatin     #GERD  -continue PTA omeprazole        DVT Prophylaxis: Pneumatic Compression Devices  Code Status: DNR/DNI  Expected discharge: 2 - 3 days, recommended to prior living arrangement once mental status at baseline.    Diet: Combination Diet Regular Diet Adult, Needs to be on sodium restricted diet (2 gr) and fluid restriction 1.8 L  Munoz Catheter: not present  Lines: PIV, RIJ port a cath --> present for 5 yrs. Allergic reaction to IV tubing      Jenna Baeza MD  PGY1, Internal Medicine  [phone]  Please see sticky note for cross cover information    Physician Attestation  I, Kaycee Lynn MD, saw this patient with the resident and agree with the resident s findings and plan of care as documented in the resident s note with my edits.     I personally reviewed vital signs, medications, labs and imaging.    Confused and disoriented, but awake and follows commands. Calm and cooperative. + Asterixis    Plan:  AXR stat, very distended abdomen + tympanic sounds throughout  Therapeutic para if no obstruction  Check C.diff   Continue lactulose    Kaycee Lynn MD  Date of Service (when I saw the patient): 11/3/18            Interval History     Nursing notes reviewed no acute events overnight. Per family patient has shown interval improvement in mental status. Family endorses patient experiences dyspnea with eating  and while walking to bathroom, expressed concern regarding need for paracentesis. Patient endorses some leg discomfort and asked for writer to stop touching it during exam.     4 Point ROS negative    Data reviewed today: I reviewed all medications, new labs and imaging results over the last 24 hours. I personally reviewed no images or EKG's today.    Physical Exam   Vital Signs: Temp: 96.1  F (35.6  C) Temp src: Axillary BP: 146/81 Pulse: 77 Heart Rate: 72 Resp: 20 SpO2: 96 % O2 Device: None (Room air)    Weight: 194 lbs .08 oz  General Appearance: Sitting in bed, head of bed elevated  Respiratory: CTAB  Cardiovascular: RRR without M   GI: Firm, Distended, hyperactive BS x4 non-tender,  Extremity: +2 pitting edema RLE, b/l dorsalis pedis pulses intact, tender to palpation left foot  Neuro: Answers questions slowly after long pauses        Data     Recent Labs  Lab 11/03/18  0538 11/02/18  0721 11/01/18  1711   WBC 2.4* 2.7* 2.7*   HGB 10.4* 11.0* 11.1*   MCV 98 95 96   PLT 42* 46* 48*   INR 1.56* 1.49* 1.51*    139 136   POTASSIUM 3.5 3.5 3.2*   CHLORIDE 110* 110* 107   CO2 20 19* 22   BUN 18 15 16   CR 1.01 0.96 1.03   ANIONGAP 9 10 7   UCEH 8.1* 8.4* 8.2*   * 173* 168*   ALBUMIN 2.7* 2.8* 2.8*   PROTTOTAL 6.1* 6.4* 6.4*   BILITOTAL 1.6* 2.0* 1.6*   ALKPHOS 301* 294* 339*   ALT 25 29 30   AST 37 39 37       Recent Results (from the past 24 hour(s))   US Lower Extremity Venous Duplex Bilateral    Narrative    EXAMINATION: DOPPLER VENOUS ULTRASOUND OF BILATERAL LOWER EXTREMITIES,  11/3/2018 11:08 AM     COMPARISON: 12/2/2015    HISTORY: Right lower extremity swelling.    TECHNIQUE:  Gray-scale evaluation with compression, spectral flow and  color Doppler assessment of the deep venous system of both legs from  groin to knee, and then at the ankles.    FINDINGS:  In both lower extremities, the common femoral, femoral, popliteal and  posterior tibial veins demonstrate normal compressibility and blood  flow.  Soft tissue edema in the bilateral ankles.        Impression    IMPRESSION:  No evidence of deep venous thrombosis in either lower extremity.    I have personally reviewed the examination and initial interpretation  and I agree with the findings.    DAYA SHAW MD     Medications     - MEDICATION INSTRUCTIONS -         calcium carbonate 500 mg-vitamin D 200 units  1 tablet Oral Daily     carBAMazepine  200 mg Oral BID     cefTRIAXone  2 g Intravenous Q24H     ciclopirox   Topical BID     cyanocobalamin  1,000 mcg Oral Daily     ferrous sulfate  325 mg Oral BID     folic acid  1 mg Oral Daily     furosemide  20 mg Oral Daily     gabapentin  100 mg Oral At Bedtime     lactulose  20 g Oral TID     levothyroxine  88 mcg Oral QAM AC     mirtazapine  45 mg Oral At Bedtime     multivitamin, therapeutic with minerals  1 tablet Oral BID w/meals     omeprazole  40 mg Oral BID AC     pravastatin  80 mg Oral Daily     rifaximin  550 mg Oral BID     spironolactone  50 mg Oral Daily     thiamine  100 mg Oral Daily

## 2018-11-03 NOTE — PLAN OF CARE
Problem: Patient Care Overview  Goal: Plan of Care/Patient Progress Review  Outcome: No Change  Port is infusing at TKO after antibiotic was given. No complaint of pain. Abdomen is very distended and patient may have a paracentesis today. Incontinent of bowel and bladder, skin care and new brief applied. Continue with current plan of nursing care, and update MD with concerns as needed.

## 2018-11-04 NOTE — PLAN OF CARE
Problem: Patient Care Overview  Goal: Plan of Care/Patient Progress Review  Outcome: No Change  Pt alert, however continues to have slow response to questions, but oriented to place and situation. Forgetful about time and prompts frequent reorientation. On bedrest. Vitally stable on ra. Incontinent of bowel and bladder; No BM or void overnight. Able to ind turn in bed. Denies pain, n/v. IV set to tko post rocephin administration. Slept through the night. BA active. Hourly rounding completed. Abdominal paracentesis site CDI. Uneventful shift. Continue to monitor and implement poc.

## 2018-11-04 NOTE — PLAN OF CARE
Problem: Patient Care Overview  Goal: Plan of Care/Patient Progress Review  Discharge Planner PT 5B  Patient plan for discharge: Home with assistance  Current status: Pt continues to require increased time for responses and mildly lethargic. Pt completes supine to sit and sit to stand transfers with SBA. Pt ambulates ~175ft with FWW and CGA with wheelchair follow, however, denies need for seated rest break. Pt completed 4 stairs ascending with reciprocal gait pattern and bilateral UE assist via 2 railings, descending facing laterally using step to pattern with 1 railing. Pt denies completion of further stairs electing to walk back to room.   Barriers to return to prior living situation: stairs, activity tolerance, weakness  Recommendations for discharge: TCU or Home with 24hr assistance  Rationale for recommendations: Pt has yet to complete safe navigation of 17 stairs which he will be required to perform at home in order to reach bed and bathroom. Currently patient recommended to go to TCU for this reason, however, with safe navigation of stairs and 24 hr supervision that can be provided at home patient will be safe for discharge home with continuation of his perviously established services of home health OT/PT/Speech.       Entered by: Mono Larsen 11/04/2018 11:27 AM

## 2018-11-04 NOTE — PROGRESS NOTES
[facility], [place]    Internal Medicine Progress Note - Maroon1 Service    Main plans for today:  -CT abdomen no acute changes  - 2g Sodium restriction diet    Assessment & Plan   Mono Varghese is a 48 year old male admitted on 11/1/2018. He has history of  left parietal astrocytoma s/p resection, alcoholic cirrhosis, hepatic encephalopathy, rectal and esophageal varices and portal vein thrombosis (not on anticoagulation), hypothyroidism and is admitted for encephalopathy and generalized weakness X 4 days. He was recently hospitalized for encephalopathy and EColi bacteremia presumed to be secondary to recent rectal variceal coiling  and discharged 10/22 on 7-day course of Augmentin. Readmitted on 11/1 for AMS after discontinue abx.      # Hepatic Encephalopathy   # Alcoholic cirrhosis c/b refractory ascites,     # h/o Esophageal and Rectal Varices  # abdominal distension  MELD-Na score: 13 at 11/3/2018  5:38 AM  MELD score: 13 at 11/3/2018  5:38 AM  Presenting with confusion and generalized weakness.  Ammonia 111.  Per wife pt compliant with lactulose and having 5 stools/d. Admission paracentesis neg SBP. UA Neg. CXR clear.  Blood cx NGTD.  CRP 17, procal not elevated.  Abdominal distension noted on physical exam. Abdominal xray showing distended bowel loops. CT without evidence of dilated bowel loops but showed thickened bowel wall consistent with portal enteropathy.  - Seth Hepatic Encephalopathy protocol initiated  - Speech eval assessed and said okay to eat  - blood cultures pending  - urine cultures pending   - ceftriaxone 2g daily until cx all neg  - continue to monitor for sepsis  - Therapeutic para 11/3  - Continue home lasix and spironolactone    # unilateral RLE swelling  Bilateral dorsalis pedis pulses intact. Extremities WWP on exam. Patient complains of pain in left leg, although right leg appears edematous. US negative for DVT    #Dysuria  Witnessed episode of  dark colored urin on 11/3, patient HD stable with Hgb 10.4 .UA negative hematuria and RBC. Calcium oxalate crystals noted in UA. US without hydronephrosis and CT abdomen without evidence of nephrolithiasis.      -----Chronic Medical Problems------  # h/o Seizures  #Hx of astrocytoma s/p chem, resection, radiation  - continue PTA carbamazepine     #Hypothyrodism  Curbside endo: no need to adjust synthroid at this time. Hypothyroid unlikely to contribute to current picture of encephalopathy  - continue PTA synthroid     #HLD  - continue PTA pravastatin     #GERD  -continue PTA omeprazole        DVT Prophylaxis: Pneumatic Compression Devices  Code Status: DNR/DNI  Expected discharge: 2 - 3 days, recommended to prior living arrangement once mental status at baseline.    Diet: 2 Gram Sodium Diet  Fluid restriction 1800 ML FLUID,    Munoz Catheter: not present  Lines: PIV, RIJ port a cath --> present for 5 yrs. Allergic reaction to IV tubing      Jenna Baeza MD  PGY1, Internal Medicine  [phone]  Please see sticky note for cross cover information      Interval History     Nursing notes reviewed no acute events overnight. Patient joking during interview. Denies abdominal pain, chest discomfort, or dyspnea. .     4 Point ROS negative    Data reviewed today: I reviewed all medications, new labs and imaging results over the last 24 hours. I personally reviewed no images or EKG's today.    Physical Exam   Vital Signs: Temp: 97.4  F (36.3  C) Temp src: Oral BP: 118/76 Pulse: 75 Heart Rate: 67 Resp: 16 SpO2: 97 % O2 Device: None (Room air)    Weight: 192 lbs 14.44 oz  General Appearance: Sitting in bed, head of bed elevated  Respiratory: CTAB  Cardiovascular: RRR without M   GI: Firm, Distended, hyperactive BS x4 non-tender,  Extremity: +2 pitting edema RLE, b/l dorsalis pedis pulses intact, tender to palpation left foot  Neuro:  (+) asterixis, Answers questions slowly after long pauses        Data     Recent Labs  Lab  11/04/18  0603 11/03/18  0538 11/02/18  0721   WBC 1.7* 2.4* 2.7*   HGB 9.8* 10.4* 11.0*   MCV 99 98 95   PLT 42* 42* 46*   INR 1.79* 1.56* 1.49*    139 139   POTASSIUM 3.4 3.5 3.5   CHLORIDE 112* 110* 110*   CO2 20 20 19*   BUN 19 18 15   CR 0.94 1.01 0.96   ANIONGAP 9 9 10   UCHE 7.9* 8.1* 8.4*   * 106* 173*   ALBUMIN 2.9* 2.7* 2.8*   PROTTOTAL 6.0* 6.1* 6.4*   BILITOTAL 1.4* 1.6* 2.0*   ALKPHOS 277* 301* 294*   ALT 22 25 29   AST 30 37 39       Recent Results (from the past 24 hour(s))   US Abdomen Complete w Doppler Complete    Narrative    EXAMINATION: US ABDOMEN COMPLETE WITH DOPPLER COMPLETE 11/3/2018 3:31  PM     COMPARISON: Ultrasound of the abdomen dated 10/19/2018.    HISTORY: Hepatic encephalopathy    TECHNIQUE: The abdomen was scanned in standard fashion with  specialized ultrasound transducer(s) using both gray-scale, color  Doppler, and spectral flow techniques.    Findings:  Liver: Unchanged cirrhotic appearance of the liver. No evidence of a  focal hepatic mass.     Nonocclusive thrombus visualized in the main portal vein.  Extrahepatic portal vein flow is antegrade at 34 cm/s.  Right portal vein flow is antegrade, measuring 18 cm/s.  Left portal vein flow is antegrade, measuring 8 cm/s. Previously noted  left portal vein thrombus not visualized on this exam. Possibly  outside the field of view.    Flow in the hepatic artery is towards the liver and:  100 cm/s peak systolic  0.64. resistive index.     The splenic vein is patent and flow is towards the liver.  The left,  middle, and right hepatic veins are patent with flow towards the IVC.  The IVC is patent with flow towards the heart.   The visualized aorta  is not dilated.    Gallbladder: Gallbladder not well visualized. There is thickening of  the gallbladder wall measuring up to 9 mm. Echogenic stone noted in  the gallbladder neck. No pericholecystic fluid.    Bile Ducts: Both the intra- and extrahepatic biliary system are of  normal  caliber.  The common bile duct measures 4 mm.    Pancreas: Visualized portions of the head and body of the pancreas are  unremarkable.     Kidneys: Both kidneys are of normal echotexture, without mass or  hydronephrosis.   Renal lengths: right- 10.3, left- 10.0. Inferior  pole of the left kidney not well visualized.    Spleen: The spleen measures 21.1 cm in length. No focal mass.    Fluid: Small bilateral pleural effusions. Large volume ascites.      Impression    Impression:   1.  Cirrhotic configuration of the liver similar to prior. No focal  mass.  2.  Unchanged nonocclusive thrombus in the main portal vein with  antegrade flow and Doppler velocities detailed above. Known left  portal vein thrombus not visualized on this exam.  3.  Patent arteries and veins in the remainder of the Doppler exam  with velocities as detailed above.  4.  Large volume ascites.  5.  Small bilateral pleural effusions.  6.  Splenomegaly.  7.  Cholelithiasis with wall thickening the gallbladder. Clinical  correlation for acute cholecystitis.    I have personally reviewed the examination and initial interpretation  and I agree with the findings.    BRIAN WORLEY MD   POC US Guide for Paracentesis    Impression    Hospitalist Procedure Service - Paracentesis Procedure Note  Date of Service: 11/03/2018     Patient: Mono Varghese  MRN: [id number]  Admission Date: 11/1/2018  Hospital Day # 2     Attending: Mony Vu MD  Resident: Little Perla MD  Procedure: Diagnostic and therapeutic paracentesis  Indication: Symptomatic abdominal distension  Pre-procedure diagnosis: Ascites  Post-procedure diagnosis: Same     The risks and benefits of the procedure were explained to Mono and his mother in law Faby (Mono was not able to consent due to mental status at the time of procedure) who expressed understanding and opted to proceed.  Consent was obtained and placed in the chart.  Ultrasound was used to find a pocket of ascites in the  left lower quadrant. A time out was performed. The area was prepped and draped in the usual sterile fashion.  10 ml of 1% lidocaine was instilled and ascites located.  A small incision was made with an 11 blade.  The MagicEvent paracentesis catheter and needle were inserted until ascites obtained then the needle removed.  The apparatus was connected to vacuum bottles and a total of 5000 ml removed.   A specimen was sent for analysis. The catheter was withdrawn and the area dressed. Pre and post-procedure images were saved to the medical record.     Patient tolerated the procedure well with no immediate complications.  The primary team was informed of the procedure.      Mony Vu MD  Med-Peds Hospitalist  Pager [phone]   XR Abdomen Port 1 View    Narrative    Exam: XR ABDOMEN PORT 1 VW, 11/3/2018 6:10 PM    Indication: r/o ileus;     Comparison: Abdominal ultrasound dated 11/3/2018, abdominal  radiographs dated 10/20/2018.    Findings:   Supine AP views of the abdomen. The lower lungs, upper abdomen and  part of the right hemiabdomen are collimated out of view. Multiple  loops of distended large and small bowel throughout the visualized  portion of the abdomen. No pneumatosis. No obvious free air on this  supine film. Embolization coils noted in the pelvis.      Impression    Impression: Multiple loops of moderately distended large and small  bowel, most likely representing ileus.    I have personally reviewed the examination and initial interpretation  and I agree with the findings.    BRIAN WORLEY MD   CT Abdomen Pelvis w Contrast    Narrative    EXAMINATION: CT ABDOMEN PELVIS W CONTRAST, 11/4/2018 1:03 PM.    TECHNIQUE: Helical CT images from the lung bases through the symphysis  pubis were obtained with IV contrast. Contrast dose: 118cc isovue 370.    COMPARISON: Radiograph 11/3/2018, MR 5/3/2018, abdominal ultrasound  11/3/2018.    HISTORY: Hx of ETOH cirrhosis presents with AMS. Now with dysuria,  dark urine  and abdominal distention. Abdominal XRAY possible ileus.  R/o nephrolithiasis  and bowel obstruction/ileus.    FINDINGS:    Abdomen and pelvis:   Cirrhotic configuration of the liver. No focal liver lesions  identified on this single phase exam. Cholelithiasis. Massive  splenomegaly and upper abdominal varices. The adrenal glands,  pancreas, and left kidney appear normal. Subcentimeter hypodensity in  the anterior right kidney is too small to characterize. No  hydronephrosis or nephrolithiasis. No intra-abdominal free air. Large  volume of ascites. No abnormally dilated loops of bowel, although  there are areas of mildly thickened bowel wall, likely representing  portal enteropathy. Normal caliber abdominal aorta. The major  abdominal vasculature is patent. Decreased size of the nonocclusive  thrombus in the main portal vein. No lymphadenopathy in the abdomen or  pelvis.    Lower chest:   The heart is not enlarged. No pericardial effusion. Partially imaged  central venous catheter tip in the right atrium. Extensive  periesophageal varices. Trace pleural effusions and bibasilar  atelectasis.    Bones and soft tissues:   Large right inguinal hernia containing ascites. Mild bilateral  gynecomastia. No acute or worrisome osseous lesions.        Impression    IMPRESSION:   1. Cirrhosis with findings of portal hypertension including large  ascites, portal enteropathy, upper abdominal varices, and  splenomegaly.  2. Decreased size of the nonocclusive main portal vein thrombus seen  on MRI 5/3/2018.  3. No hydronephrosis, nephrolithiasis, or evidence of bowel  obstruction.   4. Large right inguinal hernia containing ascites.    I have personally reviewed the examination and initial interpretation  and I agree with the findings.    DAYA SHAW MD     Medications     - MEDICATION INSTRUCTIONS -         calcium carbonate 500 mg-vitamin D 200 units  1 tablet Oral Daily     carBAMazepine  200 mg Oral BID     cefTRIAXone   2 g Intravenous Q24H     ciclopirox   Topical BID     cyanocobalamin  1,000 mcg Oral Daily     ferrous sulfate  325 mg Oral BID     folic acid  1 mg Oral Daily     furosemide  20 mg Oral Daily     gabapentin  100 mg Oral At Bedtime     lactulose  20 g Oral TID     levothyroxine  88 mcg Oral QAM AC     miconazole   Topical BID     mirtazapine  45 mg Oral At Bedtime     multivitamin, therapeutic with minerals  1 tablet Oral BID w/meals     omeprazole  40 mg Oral BID AC     pravastatin  80 mg Oral Daily     rifaximin  550 mg Oral BID     spironolactone  50 mg Oral Daily     thiamine  100 mg Oral Daily

## 2018-11-04 NOTE — PLAN OF CARE
"Problem: Patient Care Overview  Goal: Plan of Care/Patient Progress Review  Outcome: Improving  Assumed cares 0700 to 1900.    /76 (BP Location: Left arm)  Pulse 75  Temp 97.4  F (36.3  C) (Oral)  Resp 16  Ht 1.778 m (5' 10\")  Wt 82.5 kg (181 lb 14.1 oz)  SpO2 97%  BMI 26.1 kg/m2    Pain: Declines.  Neuro: Oriented to self and place only. Slow to respond. Word finding difficulty. Neuro status improved from yesterday.  Respiratory: Lung sounds clear and equal on RA.  Cardiac/Neurovascular: HR and pulses WDL. LE and scrotal edema present.  GI/: Abd distended. Bowel sounds active. Many BMs this shift. Adequate UOP, however still red in color-->PVR obtained and was 268.  Nutrition: NPO most of shift. Recently put on 2 g Na and 1800 mL fluid restricted diet. Ate 75% dinner.  Activity: Turns self in bed with minimal to no assistance. Out of bed again with PT today.  Skin: Pale. Blanchable redness on bottom and in groin. Antifungal powder as well as barrier cream applied.  Lines: Port in R chest, infusing, site WDL; dressing changed this shift as old dressing was coming off.  Events this shift: Pt refused any PRN lactulose protocol doses, did take all scheduled doses this shift. Abd CT completed (bolus admin prior to CT).      Plan: Continue to monitor.          "

## 2018-11-04 NOTE — PLAN OF CARE
Problem: Patient Care Overview  Goal: Plan of Care/Patient Progress Review  Outcome: Improving  Assumed cares from 1900 to 2300  A&O to self and place, but slow to respond. VSS on RA.  Loose/soft BM x2, incont B&B.  Denies pain.  Neuros intact.  Red, irritated groin, MD ordered antifungal powder.  Received scheduled lactulose.  C. Diff test negative.  Wife visited.  Continue to monitor and follow POC.

## 2018-11-04 NOTE — PLAN OF CARE
"Problem: Patient Care Overview  Goal: Plan of Care/Patient Progress Review  Outcome: Improving  Blood pressure 113/73, pulse 75, temperature 97.1  F (36.2  C), temperature source Oral, resp. rate 16, height 1.778 m (5' 10\"), weight 87.5 kg (192 lb 14.4 oz), SpO2 98 %.    Pain: Denies. But expressed pain in R foot, lateral portion. Pt described as \"pinching\" pain. Decline pain interventions.   Neuro: Improving. Alert & oriented with exception of today's date.  Respiratory: Clear, equal and bilateral, with infrequent, non productive cough.  Cardiac/Neurovascular: HR and pulses WDL. LE edema present but improved since 11/03/18. Scrotum edema present.  GI/: Pt stated difficulties voiding in AM. Voided in urinal, 220 mL, red-tinged. BMx3 in 45 minutes; loose and watery. Stool and urine incontinent with exception of partial incontinence this AM - was able to notify nurse of bowel and urinary urge. Incontinent in afternoon. Urinal at bedside.   Nutrition: NPO, but drank 250 mL of water with AM meds.   Activity: Turns self in bed with little to no assistance. Able to lift buttock from bed while lying down to assist with perineal care. Out of bed with PT today.  Skin: Pale. Blanchable redness on bottom. Redness around groin. Barrier cream applied.  Line: Port in R chest, infusing. Central dressing to Port peeling off. Vascular access team contacted.  dressing being sent.  Events this shift: CT completed. Bladder scan: 460 mL & 268 mL this shift.    Plan: Continue to monitor urinary output characteristics.        "

## 2018-11-05 NOTE — PLAN OF CARE
"Problem: Patient Care Overview  Goal: Plan of Care/Patient Progress Review  Outcome: No Change    Vitals: /68 (BP Location: Left arm)  Pulse 75  Temp 96.1  F (35.6  C) (Oral)  Resp 18  Ht 1.778 m (5' 10\")  Wt 82.5 kg (181 lb 14.1 oz)  SpO2 96%  BMI 26.1 kg/m2  Pain: Denies pain.   Neuro: Alert and oriented, with notably delayed responses (per chart review, spouse identifies this as baseline). No PRN doses of lactulose required or administered.  Respiratory: WDL.  Cardiac/Neurovascular: WDL.  GI/: BM x2 on overnight shift. Calling appropriately - no episodes of incontinence.  Nutrition: Regular diet.  Activity: Up with A-1 plus walker to bathroom with no concerns.  Skin: No concerns identified.  Lines: Port on R chest wall saline locked.  Endocrine: BG daily.      Sleeping well between cares. Able to make needs known and calling appropriately. Bed alarm in place for safety. Continue to monitor per plan of care.        "

## 2018-11-05 NOTE — PROGRESS NOTES
"  [facility] ID SERVICE: ONGOING CONSULTATION   Mono Varghese : 1970 Sex: male:   Medical record number [id number] Attending Physician: No att. providers found  Date of Service: 2018  PROBLEM LIST:   1. Acute encephalopathy and malaise, onset within 2 days of stopping augmentin which had concluded a 7 day course of therapy for E coli bacteremia detected on 10/22 and theorized to have originated from a coiling procedure for rectal varices. While bl cx remain negative, his improvement is fairly marked and antimicrobials are basically the only new intervention  2. ETOh cirrhosis requiring frequent paracenteses, c/b HSE, esophageal and rectal varices  3. L parietal astrocytoma s/p resection  4. Hypothyroidism  5. DM  RECOMMENDATIONS:   1. Continue ceftriaxone. At this point I would commit him to a 2 week course of therapy. This translates to a stop date of .   -Would check CMP and CBC with diff/platelets in 1 week, and repeat bl cx week of  (1 week after ceftriaxone stops) to look for E coli that may have seeded his port.   -Provided no additional questions arise and his surveillance bl cx the week of  are negative, no need for ID f/u.    Thanks for this consult. ID will sign off, but please page with additional questions!  Maegan Manuel MD   of Medicine, Division of Infectious Diseases  [facility] [phone]    INTERVAL HISTORY: (Extended HPI requires four HPI elements or the status of three chronic problems)  Seen this am. Doing well. S/p tap. Did not complain of any pain, SOBr, cough, though I\"m not sure how reliable his report is.  ROS: (Recommend ? 2systems)   A five-point review of systems was obtained and was negative with the exception of that which is described above.  Allergies   Allergen Reactions     Latex Itching and Rash     No Clinical Screening - See Comments      Coban and Surgilast cause itching     Tegaderm Transparent Dressing (Informational " "Only)      Allergies were reviewed.  No current outpatient prescriptions on file.     CURRENT ANTI-INFECTIVES:   ceftriaxone  EXAMINATION: (Recommend at least 12 bullets from any organ systems)   Vital Signs: /73 (BP Location: Left arm)  Pulse 66  Temp 96.1  F (35.6  C) (Oral)  Resp 18  Ht 1.778 m (5' 10\")  Wt 82.5 kg (181 lb 14.1 oz)  SpO2 98%  BMI 26.1 kg/m2   Awake, alert  Speech slightly slowed, but sensical  HR regular  Breathing normal, he did not cough during my exam, lungs anteriorly clear  Jaundice  Mild LE edema  NEW DATA/RESULTS:   All interval basic labs, microbiology results and imaging were personally reviewed.  Reviewed medicine test (PFTs, EKG, cardiac echo or cath): NO    Culture Micro   Date Value Ref Range Status   11/03/2018 Culture negative monitoring continues  Preliminary   11/02/2018 No growth after 3 days  Preliminary   11/02/2018 No growth after 3 days  Preliminary   11/01/2018 Culture negative monitoring continues  Preliminary   11/01/2018 Culture negative monitoring continues  Preliminary       Recent Labs   Lab Test  11/01/18 1711 08/30/13   1350   CRP  17.0*  7.3     Recent Labs   Lab Test  11/05/18   0628  11/04/18   0603  11/03/18   0538  11/02/18   0721  11/01/18   1711  10/24/18   0555   WBC  1.7*  1.7*  2.4*  2.7*  2.7*  1.8*     Recent Labs   Lab Test  11/05/18   0628  11/04/18   0603  11/03/18   0538  11/02/18   0721   CR  0.88  0.94  1.01  0.96   GFRESTIMATED  >90  85  78  83       Hematology Studies  Recent Labs   Lab Test  11/05/18   0628  11/04/18   0603  11/03/18   0538  11/02/18   0721  11/01/18   1711  10/24/18   0555  10/23/18   0641   10/18/18   1430   10/03/18   0445   09/26/18   1625   09/14/18   1157   WBC  1.7*  1.7*  2.4*  2.7*  2.7*  1.8*  1.9*   < >  1.9*   < >  2.1*   < >  2.4*   < >  2.1*   ANEU   --    --    --    --   2.0   --   1.0*   --   1.3*   --   1.4*   --   1.7   --   1.4*   AEOS   --    --    --    --   0.1   --   0.2   --   0.1   --   " 0.1   --   0.2   --   0.2   HCT  30.4*  29.8*  31.2*  32.4*  33.0*  30.2*  30.9*   < >  33.0*   < >  34.7*   < >  39.4*   < >  38.5*   PLT  41*  42*  42*  46*  48*  33*  32*   < >  37*   < >  33*   < >  41*   < >  42*    < > = values in this interval not displayed.       Metabolic  Recent Labs   Lab Test  11/05/18 0628 11/04/18   0603  11/03/18   0538   NA  140  142  139   BUN  18  19  18   CO2  19*  20  20   CR  0.88  0.94  1.01   GFRESTIMATED  >90  85  78       Hepatic Studies  Recent Labs   Lab Test  11/05/18 0628 11/04/18   0603  11/03/18   0538   BILITOTAL  1.1  1.4*  1.6*   ALKPHOS  278*  277*  301*   ALBUMIN  2.8*  2.9*  2.7*   AST  33  30  37   ALT  23  22  25       Immunologlobulins  Recent Labs   Lab Test  12/06/15   0349  08/30/13   1350   IGG  1070   --    SED   --   19*       IMAGING RESULTS  The following imaging studies were independently reviewed:    none

## 2018-11-05 NOTE — PLAN OF CARE
Problem: Patient Care Overview  Goal: Plan of Care/Patient Progress Review  Discharge Planner PT 5B   Patient plan for discharge: Home with assistance  Current status: Pt demonstrates mod I during bed mobility and sit to stand transfers. Pt ambulated ~175ft +~175ft using FWW and SBA. Pt completed 18 stairs with 1 railing and bilateral UEA using reciprocal gait pattern to ascend and step to pattern facing the railing to descend with CGA.   Barriers to return to prior living situation: activity tolerance  Recommendations for discharge: Home with 24 hr assistance  Rationale for recommendations: Today patient demonstrated ability to safely navigate stairs with minimal assistance. Pt demonstrates safe ambulation in order to return home with 24 hour assistance and continuation of established PT/OT/Speech services.        Entered by: Mono Larsen 11/05/2018 1:42 PM

## 2018-11-05 NOTE — PROGRESS NOTES
[facility], [place]    Internal Medicine Progress Note - Maroon1 Service    Main plans for today:  - Per ID complete 2 week course of ceftriaxone (stop date 11/16)  - Potential discharge tomorrow evening if mentation continues to improve    Assessment & Plan   Mono Varghese is a 48 year old male admitted on 11/1/2018. He has history of  left parietal astrocytoma s/p resection, alcoholic cirrhosis, hepatic encephalopathy, rectal and esophageal varices and portal vein thrombosis (not on anticoagulation), hypothyroidism and is admitted for encephalopathy and generalized weakness X 4 days. He was recently hospitalized for encephalopathy and EColi bacteremia presumed to be secondary to recent rectal variceal coiling  and discharged 10/22 on 7-day course of Augmentin. Readmitted on 11/1 for AMS after discontinue abx.      # Hepatic Encephalopathy, improving   # Alcoholic cirrhosis c/b refractory ascites,     # h/o Esophageal and Rectal Varices  # abdominal distension  MELD-Na score: 13 at 11/5/2018  6:28 AM  MELD score: 13 at 11/5/2018  6:28 AM  Presenting with confusion and generalized weakness.  Ammonia 111.  Per wife pt compliant with lactulose and having 5 stools/d. Admission paracentesis neg SBP. UA Neg. CXR clear.  Blood cx NGTD.  CRP 17, procal not elevated.  Abdominal distension noted on physical exam. Abdominal xray showing distended bowel loops. CT without evidence of dilated bowel loops   - [place] Hepatic Encephalopathy protocol initiated  - Speech eval assessed and said okay to eat  - ceftriaxone 2g daily for 14 days  - continue to monitor for sepsis  - Therapeutic para 11/3  - Continue home lasix and spironolactone      -----Chronic Medical Problems------  # h/o Seizures  #Hx of astrocytoma s/p chem, resection, radiation  - continue PTA carbamazepine     #Hypothyrodism  Curbside endo: no need to adjust synthroid at this time. Hypothyroid unlikely to contribute to current  picture of encephalopathy  - continue PTA synthroid     #HLD  - continue PTA pravastatin     #GERD  -continue PTA omeprazole        DVT Prophylaxis: Pneumatic Compression Devices  Code Status: DNR/DNI  Expected discharge: 2 - 3 days, recommended to prior living arrangement once mental status at baseline.    Diet: 2 Gram Sodium Diet  Fluid restriction 1800 ML FLUID,    Munoz Catheter: not present  Lines: PIV, RIJ port a cath --> present for 5 yrs. Allergic reaction to IV tubing      Jenna Baeza MD  PGY1, Internal Medicine  [phone]  Please see sticky note for cross cover information    Physician Attestation  I, Kaycee Lynn MD, saw this patient with the resident and agree with the resident s findings and plan of care as documented in the resident s note with my edits.     I personally reviewed vital signs, medications, labs and imaging.    Kaycee Lynn MD  Date of Service (when I saw the patient): 11/5/18      Interval History     Nursing notes reviewed no acute events overnight. Family at bedside. Per family pt's mentation is almost back to baseline.     4 Point ROS negative    Data reviewed today: I reviewed all medications, new labs and imaging results over the last 24 hours. I personally reviewed no images or EKG's today.    Physical Exam   Vital Signs: Temp: 96.7  F (35.9  C) Temp src: Oral BP: 121/71 Pulse: 71 Heart Rate: 67 Resp: 17 SpO2: 98 % O2 Device: None (Room air)    Weight: 181 lbs 14.07 oz  General Appearance: Sitting in bed, head of bed elevated  Respiratory: CTAB  Cardiovascular: RRR without M   GI: Firm, Distended, hyperactive BS x4 non-tender,  Extremity: +2 pitting edema RLE, b/l dorsalis pedis pulses intact, tender to palpation left foot  Neuro:  Asterixis improved compared to yesterday. Pt is oriented to place and person, but not to the date. He is awake, alert, but still slow to respond questions (although better than yesterday) and appears puzzled when asked about date. Per family, he is  almost back to baseline mentation.         Data     Recent Labs  Lab 11/05/18  0628 11/04/18  0603 11/03/18  0538   WBC 1.7* 1.7* 2.4*   HGB 10.1* 9.8* 10.4*   MCV 98 99 98   PLT 41* 42* 42*   INR 1.74* 1.79* 1.56*    142 139   POTASSIUM 3.3* 3.4 3.5   CHLORIDE 111* 112* 110*   CO2 19* 20 20   BUN 18 19 18   CR 0.88 0.94 1.01   ANIONGAP 10 9 9   UCHE 7.9* 7.9* 8.1*   * 110* 106*   ALBUMIN 2.8* 2.9* 2.7*   PROTTOTAL 5.9* 6.0* 6.1*   BILITOTAL 1.1 1.4* 1.6*   ALKPHOS 278* 277* 301*   ALT 23 22 25   AST 33 30 37       No results found for this or any previous visit (from the past 24 hour(s)).  Medications     - MEDICATION INSTRUCTIONS -         calcium carbonate 500 mg-vitamin D 200 units  1 tablet Oral Daily     carBAMazepine  200 mg Oral BID     cefTRIAXone  2 g Intravenous Q24H     ciclopirox   Topical BID     cyanocobalamin  1,000 mcg Oral Daily     ferrous sulfate  325 mg Oral BID     folic acid  1 mg Oral Daily     furosemide  20 mg Oral Daily     gabapentin  100 mg Oral At Bedtime     lactulose  20 g Oral TID     levothyroxine  88 mcg Oral QAM AC     miconazole   Topical BID     mirtazapine  45 mg Oral At Bedtime     multivitamin, therapeutic with minerals  1 tablet Oral BID w/meals     omeprazole  40 mg Oral BID AC     pravastatin  80 mg Oral Daily     rifaximin  550 mg Oral BID     spironolactone  50 mg Oral Daily     thiamine  100 mg Oral Daily

## 2018-11-05 NOTE — PROGRESS NOTES
Care Coordinator - Discharge Planning    Admission Date/Time:  11/1/2018  Attending MD:  Riana att. providers found     Data  Date of initial CC assessment:    Chart reviewed, discussed with interdisciplinary team.   Patient was admitted for:   1. Alcoholic cirrhosis of liver with ascites (H)    2. Hepatic encephalopathy (H)    3. Alcoholic liver failure (H)    4. Other ascites         Assessment   Full assessment completed in previous note    Coordination of Care and Referrals: Provided patient/family with options for Home Infusion.  Notified by medicine team that patient will need a total of two weeks of IV Rocephin. Possible discharge today or tomorrow. Spoke with both patient and spouse (via phone) about home infusion set up. They are agreeable and wife Yisel will be the one to administer (she is a nurse). Preferred agency is [facility]; referral placed. Declining any teaching. Port a cath in place. Opened to [facility]. Per MD, if wife feels patient is back to baseline mental status and ID in agreement, patient will be discharged. [facility] liaison notified. Patient has already had todays dose of Rocephin. RNCC to continue to follow.       Plan  Anticipated Discharge Date:  Today vs Tomorrow  Anticipated Discharge Plan:  Home with services      Kyleigh Aburto RN

## 2018-11-05 NOTE — PLAN OF CARE
"Problem: Patient Care Overview  Goal: Plan of Care/Patient Progress Review  Outcome: Improving  Assumed cares 0700 to 1500.     /73 (BP Location: Left arm)  Pulse 66  Temp 96.1  F (35.6  C) (Oral)  Resp 18  Ht 1.778 m (5' 10\")  Wt 82.5 kg (181 lb 14.1 oz)  SpO2 98%  BMI 26.1 kg/m2     Pain: Reported in ribs, no intervention per pt.  Neuro: Disoriented to situation. Neuro status at baseline per pt's family. Pt much more alert today than prior days. Still slow to respond and with word finding difficulty.  Respiratory: Lung sounds clear and equal on RA.  Cardiac/Neurovascular: HR and pulses WDL. Scrotal and LE edema present.  GI/: Abd distended and taut. Bowl sounds active. 2 BMs. Adequate UOP--still darker in color. No episodes of incontinence.  Nutrition: Good PO intake. Ate 100% breakfast tray.  Activity: Up with 1 assist.  Skin: Bottom has blanchable redness and groin is reddened, barrier cream and antifungal powder applied.  Lines: Port in R chest, saline locked, site WDL.    Plan: Continue to monitor.        "

## 2018-11-05 NOTE — PLAN OF CARE
Problem: Patient Care Overview  Goal: Plan of Care/Patient Progress Review  Discharge Planner OT   Patient plan for discharge: home with A  Current status: Pt performed bed mobility supine to seated EOB, sit to stand using walker and ambulation ~250 ft with walker and SBA. Completed sit to stands x 4 with SBA. Standing tolerance of ~7 minutes. Pt able to complete self cares for g/h tasks standing at sink with set up and SBA. Provided education on EC/WS and pacing strategies in relation to performing ADLs-pt receptive.   Barriers to return to prior living situation: fatigue, deconditioning, weakness  Recommendations for discharge: After collaboration with the OT, the recommended dc location is now home with 24 hour A and resumption of services.

## 2018-11-05 NOTE — PLAN OF CARE
Problem: Patient Care Overview  Goal: Plan of Care/Patient Progress Review  Outcome: Improving  Assumed cares from 1900 to 2300  A&O to self and place, but slow to respond (wife reports he is at baseline). VSS on RA.  Loose/soft BM x1, incont B&B.  Denies pain.  Neuros intact.  Appetite is fair.  Received scheduled lactulose.  A-1 with walker.  Wife visited.  Continue to monitor and follow POC.

## 2018-11-06 NOTE — PLAN OF CARE
Problem: Patient Care Overview  Goal: Plan of Care/Patient Progress Review  PT 5B: cancel. Pt refuses to work with PT despite encouragement and education on PT benefits. Will reschedule.

## 2018-11-06 NOTE — PROGRESS NOTES
Care Coordinator - Discharge Planning    Admission Date/Time:  11/1/2018  Attending MD:  Riana att. providers found     Data  Date of initial CC assessment:    Chart reviewed, discussed with interdisciplinary team.   Patient was admitted for:   1. Alcoholic cirrhosis of liver with ascites (H)    2. Hepatic encephalopathy (H)    3. Alcoholic liver failure (H)    4. Other ascites         Assessment   Full assessment completed in previous note    Coordination of Care and Referrals: Provided patient/family with options for discharge  Patient medically ready to discharge home today.  [facility] and [facility] liaisons notified. Has already received todays dose of Rocephin IV. Family to pick patient up later today after work.  Plan  Anticipated Discharge Date:  Today  Anticipated Discharge Plan:  Home with home care and home infusion        Kyleigh Aburto RN

## 2018-11-06 NOTE — PLAN OF CARE
"Problem: Patient Care Overview  Goal: Plan of Care/Patient Progress Review  Outcome: Improving  Assumed cares 0700 to 1500.      /78 (BP Location: Left arm)  Pulse 66  Temp 98.3  F (36.8  C) (Oral)  Resp 18  Ht 1.778 m (5' 10\")  Wt 84.8 kg (186 lb 14.4 oz)  SpO2 95%  BMI 26.82 kg/m2      Pain: Declined.  Neuro: Disoriented to situation and time. Still slow to respond and with word finding difficulty. Pt more agitated/frustrated today and intermittently uncooperative, refused PM lactulose dose.  Respiratory: Lung sounds clear and equal on RA.  Cardiac/Neurovascular: HR and pulses WDL. Scrotal and LE edema present.  GI/: Abd distended and taut. Bowl sounds active. 1 BM today. Adequate UOP. Some incontinence this shift.  Nutrition: Good PO intake. Ate 100% breakfast tray.  Activity: Up with 1 assist.  Skin: Bottom has blanchable redness and groin is reddened, barrier cream and antifungal powder applied.  Lines: Port in R chest, saline locked, site WDL.     Plan: Continue to monitor.      "

## 2018-11-06 NOTE — PROGRESS NOTES
Neuro: Alert but disorientented to time and situation, but calm and accepting     Resp: VSS on RA, O2 sat above 95%    Activity: Pt ambulated with assist x1 to the bathroom.    /GI: Pt voided and had BM twice during shift. Denies pain, N/V.    Diet: Regular diet, good appetite, ate 75% of his meal today.    Skin: Some ecchymosis noted LUE. Blanchable redness noted on buttocks. Cleaned pt and repositioned with pillows.     Hygiene: Changed linens 2x.      Pain: Pt denies pain    Plan: Goal for discharge tomorrow    YEFRI HOPE, STUDENT NURSE

## 2018-11-06 NOTE — PROGRESS NOTES
Home Infusion  Mono is expected to dc today and will be going home on daily IV rocephin.  He has never been on home IV therapy before however his mother-in-law Faby who will be administering the IV rocephin is a retired NP and is very willing to manage the IV therapy.  Mono has cognitive deficits and is not able to manage his cares independently.  Home nursing, therapies and HHA were being provided by [facility] prior to this admission and they will resume services after discharge.  Spoke with Faby on the phone and provided her with information about [facility] services.  Explained about administration method for the rocephin which will be IVP and explained that a home nurse will provide additional teaching needed for the administration tomorrow (pt already received today's dose).  Informed her about supplies and delivery of supplies, storage of medication, dosing times, plan for SNV and 24/7 availability of [facility] staff while on IV therapy.     Faby verbalized understanding of all information given.   She is willing and able to learn and manage home IV therapy.  Questions answered.  hospitals will compound and deliver med and supplies once orders are signed and deliver to pt's home later today.  [facility] liaison updated on need for SNV tomorrow morning.  Nerissa Schroeder Home Infusion Liaison  [phone] pager

## 2018-11-06 NOTE — PLAN OF CARE
Problem: Patient Care Overview  Goal: Plan of Care/Patient Progress Review  Outcome: Improving  A&O to self and place, may take some time to respond (wife reports he is at baseline). VSS on RA. Incont B&B; brief on. Abdomen distended, denies pain.  Neuros intact. Slept throughout shift. Continue to monitor and follow POC.

## 2018-11-07 NOTE — PLAN OF CARE
Problem: Patient Care Overview  Goal: Plan of Care/Patient Progress Review  Pt disoriented to time and situation. VSS on RA. Pt slow to respond to questions. Up with 1 ass. Up to the bathroom 1x. Appeared a little weak, may want to use walker and transfer belt. Pt has LE and scrotal edema. Bowel sounds active. Denies pain.  Port-a-cath, TKO infusing; will need new dressing change. Allergic to tegaderm. Paged IV team and ordered dressing. Possibly discharging today. Ammonia came back high this morning 107, gave PRN Lactulose.  Blanchable redness on bottom and groin. Will continue to monitor.

## 2018-11-07 NOTE — PLAN OF CARE
Problem: Patient Care Overview  Goal: Plan of Care/Patient Progress Review  Pt disoriented to time and situation. VSS on RA. Pt slow to respond to questions. Incontinent BM x3. Pt has LE and scrotal edema. Bowel sounds active. Good PO intact. Denies pain. Up with 1 ass. Port-a-cath SL, site WDL. Blanchable redness on bottom and groin. Will continue to monitor.

## 2018-11-07 NOTE — PLAN OF CARE
Problem: Patient Care Overview  Goal: Plan of Care/Patient Progress Review  VSS. Alert and oriented to self and place but disoriented to time and date.  Stage 1 HE lactulose protocol started at 6 am.  Patient received 3 doses of lactulose in addition to his daily dose.  He has had 2 large loose stools.  He has refused food so far today.  He was unable to walk with PT because of diarrhea.   No discharge today.  1800cc fluid restriction continues.    Continue with plan of care.

## 2018-11-07 NOTE — PROGRESS NOTES
[facility], [place]    Internal Medicine Progress Note - Maroon1 Service    Main plans for today:  - Nursing to notify MD if patient refuses lactulose (patient care order)  - PT re-consulted this am to address concerns of newly worsened weakness (observed by overnight RN.    - Will watch overnight to titrate lactulose to at least 4 stools per 24hours      Assessment & Plan   Mono Varghese is a 48 year old male admitted on 11/1/2018. He has history of  left parietal astrocytoma s/p resection, alcoholic cirrhosis, hepatic encephalopathy, rectal and esophageal varices and portal vein thrombosis (not on anticoagulation), hypothyroidism and is admitted for encephalopathy and generalized weakness X 4 days. He was recently hospitalized for encephalopathy and EColi bacteremia presumed to be secondary to recent rectal variceal coiling  and discharged 10/22 on 7-day course of Augmentin. Readmitted on 11/1 for AMS after discontinue abx.      # Hepatic Encephalopathy,  # Alcoholic cirrhosis c/b refractory ascites,     # h/o Esophageal and Rectal Varices  # abdominal distension  MELD-Na score: 14 at 11/7/2018  5:24 AM  MELD score: 14 at 11/7/2018  5:24 AM  On evening of  11/6 patient noted to be incontinent and disoriented.  Per nursing documentation patient has been refusing lactulose  had 1 stool on 11/6. Ammonia level found to be 107. Anticipate discharge in the morning pending improvement in encephalopathy with lactulose.   - Patient care order placed to notify MD if refusing lactulose  - [place] Hepatic Encephalopathy protocol initiated  - Speech eval assessed and said okay to eat  - Per ID complete 2 week course of ceftriaxone (stop date 11/16)  - Continue home lasix and spironolactone    -----Chronic Medical Problems------  # h/o Seizures  #Hx of astrocytoma s/p chem, resection, radiation  - continue PTA carbamazepine     #Hypothyrodism  Curbside endo: no need to adjust synthroid at this  time. Hypothyroid unlikely to contribute to current picture of encephalopathy  - continue PTA synthroid     #HLD  - continue PTA pravastatin     #GERD  -continue PTA omeprazole        DVT Prophylaxis: Pneumatic Compression Devices  Code Status: DNR/DNI  Expected discharge: 2 - 3 days, recommended to prior living arrangement once mental status at baseline.    Diet: 2 Gram Sodium Diet  Fluid restriction 1800 ML FLUID  Diet,    Munoz Catheter: not present  Lines: PIV, RIJ port a cath --> present for 5 yrs. Allergic reaction to IV tubing      Jenna Baeza MD  PGY1, Internal Medicine  [phone]  Please see sticky note for cross cover information        Interval History     Nursing notes reviewed no acute events overnight. Patient agreed to stop refusing lactulose because he wants to get home.     4 Point ROS negative    Data reviewed today: I reviewed all medications, new labs and imaging results over the last 24 hours. I personally reviewed no images or EKG's today.    Physical Exam   Vital Signs: Temp: 97.2  F (36.2  C) Temp src: Oral BP: 123/76 Pulse: 77 Heart Rate: 73 Resp: 18 SpO2: 96 % O2 Device: None (Room air)    Weight: 180 lbs 9.6 oz  General Appearance: Sitting in bed, head of bed elevated  Respiratory: CTAB  Cardiovascular: RRR without M   GI: Firm, Distended, hyperactive BS x4 non-tender,  Extremity: +2 pitting edema RLE, b/l dorsalis pedis pulses intact, tender to palpation left foot  Neuro:  Asterixis worse compared to yesterday am (worse on R compared to left). Pt is oriented to place and person, but not to the date. He is awake, alert, but still slow to respond questions (although better than yesterday) and appears puzzled when asked about date.    Data     Recent Labs  Lab 11/07/18  0524 11/07/18  0141 11/06/18  0701 11/05/18  0628 11/04/18  0603 11/03/18  0538   WBC 2.3*  --  1.9* 1.7* 1.7* 2.4*   HGB 10.4*  --  10.2* 10.1* 9.8* 10.4*   MCV 97  --  98 98 99 98   PLT 41*  --  43* 41* 42* 42*    INR  --   --   --  1.74* 1.79* 1.56*     --  140 140 142 139   POTASSIUM 3.7 3.7 3.3* 3.3* 3.4 3.5   CHLORIDE 109  --  108 111* 112* 110*   CO2 20  --  20 19* 20 20   BUN 16  --  17 18 19 18   CR 0.86  --  0.89 0.88 0.94 1.01   ANIONGAP 11  --  12 10 9 9   UCHE 7.6*  --  7.6* 7.9* 7.9* 8.1*   *  --  127* 112* 110* 106*   ALBUMIN 2.7*  --  2.7* 2.8* 2.9* 2.7*   PROTTOTAL 6.0*  --  5.9* 5.9* 6.0* 6.1*   BILITOTAL 1.4*  --  1.1 1.1 1.4* 1.6*   ALKPHOS 279*  --  301* 278* 277* 301*   ALT 21  --  22 23 22 25   AST 31  --  33 33 30 37       No results found for this or any previous visit (from the past 24 hour(s)).  Medications     - MEDICATION INSTRUCTIONS -         calcium carbonate 500 mg-vitamin D 200 units  1 tablet Oral Daily     carBAMazepine  200 mg Oral BID     cefTRIAXone  2 g Intravenous Q24H     ciclopirox   Topical BID     cyanocobalamin  1,000 mcg Oral Daily     ferrous sulfate  325 mg Oral BID     folic acid  1 mg Oral Daily     furosemide  20 mg Oral Daily     gabapentin  100 mg Oral At Bedtime     lactulose  20 g Oral TID     levothyroxine  88 mcg Oral QAM AC     miconazole   Topical BID     mirtazapine  45 mg Oral At Bedtime     multivitamin, therapeutic with minerals  1 tablet Oral BID w/meals     omeprazole  40 mg Oral BID AC     pravastatin  80 mg Oral Daily     rifaximin  550 mg Oral BID     spironolactone  50 mg Oral Daily     thiamine  100 mg Oral Daily

## 2018-11-07 NOTE — PLAN OF CARE
Problem: Patient Care Overview  Goal: Plan of Care/Patient Progress Review  Discharge Planner PT   Patient plan for discharge: home with family to assist  Current status: PT: Pt supine, agreeable to working with PT with some encouragement. Pt transfers CGA progressing to min A sup>sit for A R LE and verbal plus tactile cues for hand placement to ease transfer. Pt seated EOB SBA, mod A to don robe, socks etc. Pt initiated standing with PT to work on walking and then suddenly hurriedly, reached for commode at bedside. Pt incontinent of loose, forceful bowel movement with pt providing no verbal warning, required mod A to safely sit on commode. Pt needed total A by PT and RN for cleanup, hygeine, washup, and full change of clothing with extra time required to complete. Pt wished to sit on commode longer and RN finishing assisting pt upon PT departure.  Barriers to return to prior living situation: incontinence of very loose stool, medical status, current functional status as pt may be weaker in last couple days, PT will have to further assess functional mobility again tomorrow due to inability to further assess today  Recommendations for discharge: PT will need to assess pt's functional mobility status again tomorrow morning to determine if home with 24 hour assist is still a safe discharge plan.  Rationale for recommendations: based on current functional status today, PT needs to further assess function tomorrow       Entered by: Rahel Rios 11/07/2018 5:08 PM

## 2018-11-07 NOTE — PROGRESS NOTES
General acute hospital, [place]    Internal Medicine Progress Note - Maroon1 Service    Main plans for today:  - Per ID complete 2 week course of ceftriaxone (stop date 11/16)  - Nursing to notify MD if patient refuses lactulose  - Will watch overnight to titrate lactulose to at least 4 stools per 24hours  - Patient care order placed to notify MD if refusing lactulose  - PT evaluation in am.     Assessment & Plan   Mono Varghese is a 48 year old male admitted on 11/1/2018. He has history of  left parietal astrocytoma s/p resection, alcoholic cirrhosis, hepatic encephalopathy, rectal and esophageal varices and portal vein thrombosis (not on anticoagulation), hypothyroidism and is admitted for encephalopathy and generalized weakness X 4 days. He was recently hospitalized for encephalopathy and EColi bacteremia presumed to be secondary to recent rectal variceal coiling  and discharged 10/22 on 7-day course of Augmentin. Readmitted on 11/1 for AMS after discontinue abx.      # Hepatic Encephalopathy,  # Alcoholic cirrhosis c/b refractory ascites,     # h/o Esophageal and Rectal Varices  # abdominal distension  MELD-Na score: 13 at 11/6/2018  7:01 AM  MELD score: 13 at 11/6/2018  7:01 AM  Anticipated discharge 11/6. Per nursing patient was incontinent and disoriented. Per nursing documentation patient has been refusing lactulose. Per EMR I&O patient had 1 stool on 11/6. Patient found to be altered on exam ~5pm. Oriented to self and place but not time. Asterixis noted on evening exam worse than am.   - Patient care order placed to notify MD if refusing lactulose  - [place] Hepatic Encephalopathy protocol initiated  - Speech eval assessed and said okay to eat  - ceftriaxone 2g daily for 14 days  - continue to monitor for sepsis  - Therapeutic para 11/3  - Continue home lasix and spironolactone      -----Chronic Medical Problems------  # h/o Seizures  #Hx of astrocytoma s/p chem, resection,  radiation  - continue PTA carbamazepine     #Hypothyrodism  Curbside endo: no need to adjust synthroid at this time. Hypothyroid unlikely to contribute to current picture of encephalopathy  - continue PTA synthroid     #HLD  - continue PTA pravastatin     #GERD  -continue PTA omeprazole        DVT Prophylaxis: Pneumatic Compression Devices  Code Status: DNR/DNI  Expected discharge: 2 - 3 days, recommended to prior living arrangement once mental status at baseline.    Diet: 2 Gram Sodium Diet  Fluid restriction 1800 ML FLUID  Diet,    [place] Catheter: not present  Lines: PIV, RIJ port a cath --> present for 5 yrs. Allergic reaction to IV tubing      Jenna Baeza MD  PGY1, Internal Medicine  P:[phone]  Please see sticky note for cross cover information        Interval History     Nursing notes reviewed no acute events overnight. Patient visited in am and evening.      4 Point ROS negative    Data reviewed today: I reviewed all medications, new labs and imaging results over the last 24 hours. I personally reviewed no images or EKG's today.    Physical Exam   Vital Signs: Temp: 97.7  F (36.5  C) Temp src: Oral BP: 112/73 Pulse: 75 Heart Rate: 74 Resp: 18 SpO2: 95 % O2 Device: None (Room air)    Weight: 180 lbs 9.6 oz  General Appearance: Sitting in bed, head of bed elevated  Respiratory: CTAB  Cardiovascular: RRR without M   GI: Firm, Distended, hyperactive BS x4 non-tender,  Extremity: +2 pitting edema RLE, b/l dorsalis pedis pulses intact, tender to palpation left foot  Neuro:  Asterixis improved compared to yesterday (worse on R compared to left). Pt is oriented to place and person, but not to the date. He is awake, alert, but still slow to respond questions (although better than yesterday) and appears puzzled when asked about date. Per family, he is almost back to baseline mentation.     Data     Recent Labs  Lab 11/06/18  0701 11/05/18  0628 11/04/18  0603 11/03/18  0538   WBC 1.9* 1.7* 1.7* 2.4*   HGB 10.2*  10.1* 9.8* 10.4*   MCV 98 98 99 98   PLT 43* 41* 42* 42*   INR  --  1.74* 1.79* 1.56*    140 142 139   POTASSIUM 3.3* 3.3* 3.4 3.5   CHLORIDE 108 111* 112* 110*   CO2 20 19* 20 20   BUN 17 18 19 18   CR 0.89 0.88 0.94 1.01   ANIONGAP 12 10 9 9   UCHE 7.6* 7.9* 7.9* 8.1*   * 112* 110* 106*   ALBUMIN 2.7* 2.8* 2.9* 2.7*   PROTTOTAL 5.9* 5.9* 6.0* 6.1*   BILITOTAL 1.1 1.1 1.4* 1.6*   ALKPHOS 301* 278* 277* 301*   ALT 22 23 22 25   AST 33 33 30 37       No results found for this or any previous visit (from the past 24 hour(s)).  Medications     - MEDICATION INSTRUCTIONS -         calcium carbonate 500 mg-vitamin D 200 units  1 tablet Oral Daily     carBAMazepine  200 mg Oral BID     cefTRIAXone  2 g Intravenous Q24H     ciclopirox   Topical BID     cyanocobalamin  1,000 mcg Oral Daily     ferrous sulfate  325 mg Oral BID     folic acid  1 mg Oral Daily     furosemide  20 mg Oral Daily     gabapentin  100 mg Oral At Bedtime     lactulose  20 g Oral TID     levothyroxine  88 mcg Oral QAM AC     miconazole   Topical BID     mirtazapine  45 mg Oral At Bedtime     multivitamin, therapeutic with minerals  1 tablet Oral BID w/meals     omeprazole  40 mg Oral BID AC     pravastatin  80 mg Oral Daily     rifaximin  550 mg Oral BID     spironolactone  50 mg Oral Daily     thiamine  100 mg Oral Daily

## 2018-11-07 NOTE — PROGRESS NOTES
Care Coordinator - Discharge Planning    Admission Date/Time:  11/1/2018  Attending MD:  Kenney Florez DO     Data  Date of initial CC assessment:    Chart reviewed, discussed with interdisciplinary team.   Patient was admitted for:   1. Alcoholic cirrhosis of liver with ascites (H)    2. Hepatic encephalopathy (H)    3. Alcoholic liver failure (H)    4. Other ascites    5. Bacteremia    6. Dermatitis         Assessment   Full assessment completed in previous note    Patient did not discharge yesterday evening due to change in status. Notified by MD that plan is for possible discharge tomorrow. Patient will remain hospitalized today. [facility] notified.      Plan  Anticipated Discharge Date:  Tomorrow  Anticipated Discharge Plan:  Home        Kyleigh Aburto RN

## 2018-11-08 NOTE — PLAN OF CARE
Problem: Patient Care Overview  Goal: Plan of Care/Patient Progress Review  Physical Therapy Discharge Summary    Reason for therapy discharge:    Discharged to home with home therapy.    Progress towards therapy goal(s). See goals on Care Plan in [facility] electronic health record for goal details.  Goals not met.  Barriers to achieving goals:   discharge from facility.    Therapy recommendation(s):    Continued therapy is recommended.  Rationale/Recommendations:  to improve safety with mobility, functioanl strength and activity tolerance.

## 2018-11-08 NOTE — DISCHARGE SUMMARY
[facility], [place]    Internal Medicine Discharge Summary- Piedad Service    Date of Admission:  11/1/2018  Date of Discharge:  11/8/2018  Discharging Attending Provider: Dr. Kenney Florez  Discharge Team: Piedad 1    Discharge Diagnoses   Acute hepatic encephalopathy  Alcoholic cirrhosis complicated by refractory ascites  Esophageal and rectal varices status post coiling complicated by E coli bacteremia    Follow-ups Needed After Discharge   Follow up with PCP within 1 week with repeat CMP, CBC and repeat blood cultures 11/23 - if positive will need ID follow up    Hospital Course   Mono Varghese was admitted on 11/1/2018 for confusion and elevated ammonia consistent with hepatic encephalopathy.  The following problems were addressed during his hospitalization:    # Hepatic Encephalopathy  # Alcoholic cirrhosis c/b refractory ascites     # h/o Esophageal and Rectal Varices with post procedure E coli bacteremia  Presented with confusion, generalized weakness, and elevated ammonia consistent with hepatic encephalopathy and similar to prior presentations. This episode occurred 2 days after completing course of antibiotics for E coli bacteremia which occurred after rectal varices coiling. Although uncertain (infectious workup negative), etiology felt most likely to be infectious and patient was started on IV antibiotics and treated with the [place] protocol with improvement in encephalopathy.  - Per ID complete 2 week course of ceftriaxone (stop date 11/16)  - Lactulose 20 grams TID titrated to 3-5 bowel movements daily  - Continue home lasix and spironolactone  - Paracentesis culture returned positive on day 5 for P. [place] - likely contaminant but should be covered by ceftriaxone     -----Chronic Medical Problems------  # h/o Seizures  #Hx of astrocytoma s/p chem, resection, radiation  - continue PTA carbamazepine      #Hypothyrodism  Curbside endo: no need to adjust synthroid at this  time. Hypothyroid unlikely to contribute to current picture of encephalopathy  - continue PTA synthroid      #HLD  - continue PTA pravastatin      #GERD  -continue PTA omeprazole    Consultations This Hospital Stay   SWALLOW EVAL SPEECH PATH AT BEDSIDE IP CONSULT  PHYSICAL THERAPY ADULT IP CONSULT  OCCUPATIONAL THERAPY ADULT IP CONSULT  INFECTIOUS DISEASE GENERAL ADULT IP CONSULT  INTERNAL MEDICINE PROCEDURE TEAM ADULT IP CONSULT [place] - DIALYSIS NON TUNNELED CENTRAL LINE PLACEMENT  MEDICATION HISTORY IP PHARMACY CONSULT  INTERNAL MEDICINE PROCEDURE TEAM ADULT IP CONSULT [facility] - PARACENTESIS  INTERNAL MEDICINE PROCEDURE TEAM ADULT IP CONSULT [place] - PARACENTESIS  PHYSICAL THERAPY ADULT IP CONSULT    Code Status   DNR / DNI       The patient was discussed with Dr. Florez.    Daniel Henson  Internal Medicine PGY3  [facility]  Pager: [phone]  ______________________________________________________________________    Physical Exam   Vital Signs: Temp: 97.1  F (36.2  C) Temp src: Oral BP: 107/69 Pulse: 80 Heart Rate: 72 Resp: 17 SpO2: 96 % O2 Device: None (Room air)    Weight: 182 lbs 8 oz    General Appearance: pleasant, comfortable, NAD  Respiratory: clear bilaterally  Cardiovascular: normal rate, regular rhythm, no m/r/g  GI: soft, distended, non tender  Skin: no rashes or jaundice, Gaudencio nails  Other: alert and oriented, no asterixis    Significant Results and Procedures   CT A/P:  IMPRESSION:   1. Cirrhosis with findings of portal hypertension including large  ascites, portal enteropathy, upper abdominal varices, and  splenomegaly.  2. Decreased size of the nonocclusive main portal vein thrombus seen  on MRI 5/3/2018.  3. No hydronephrosis, nephrolithiasis, or evidence of bowel  obstruction.   4. Large right inguinal hernia containing ascites.    Pending Results   These results will be followed up by PCP  Unresulted Labs Ordered in the Past 30 Days of this Admission     Date and Time  Order Name Status Description    11/6/2018 1536 BLOOD CULTURE Preliminary     11/1/2018 2131 Anaerobic bacterial culture Preliminary     11/1/2018 1711 T4 free In process     9/26/2018 1625 T4 free In process              Primary Care Physician   King Louis    Discharge Disposition   Discharged to home  Condition at discharge: Fair    Discharge Orders     Comprehensive metabolic panel     CBC with platelets differential   Last Lab Result: Hemoglobin (g/dL)      Date                     Value                11/06/2018               10.2 (L)         ----------     Home care nursing referral     Home Care PT Referral for Hospital Discharge     Home Care OT Referral for Hospital Discharge     Home Care SLP Referral for Hospital Discharge     Home infusion referral     Medication Therapy Management Referral     Reason for your hospital stay   You were hospitalized for altered mental status.     Adult [facility] Follow-up and recommended labs and tests   - Follow up with primary care provider, King Louis, within 7 days for hospital follow- up.  The following labs/tests are recommended: CMP, CBC w/ platelets in 1 week.    - Repeat blood cultures 11/23 (1 week after finishing antibiotics) and port cultures to look for E.coli that may have seeded port.   Follow up with ID only if blood cultures return positive.     Appointments on [place] and/or [facility] (with [facility] or [facility] provider or service). Call [phone] if you haven't heard regarding these appointments within 7 days of discharge.     Activity   Your activity upon discharge: activity as tolerated     DNR/DNI     Blood culture   X 3 sites (two peripheral cultures, one right port culture)     Diet   Follow this diet upon discharge: Orders Placed This Encounter     Fluid restriction 1800 ML FLUID     2 Gram Sodium Diet       Discharge Medications   Current Discharge Medication List      START taking these medications    Details   cefTRIAXone  (ROCEPHIN) 2 GM vial Inject 2 g into the vein every 24 hours for 9 days  Qty: 180 mL, Refills: 0    Associated Diagnoses: Bacteremia      miconazole (MICATIN; MICRO GUARD) 2 % powder Apply topically 2 times daily To groin as needed  Qty: 43 g, Refills: 1    Associated Diagnoses: Dermatitis         CONTINUE these medications which have NOT CHANGED    Details   Calcium Carb-Cholecalciferol (CALCIUM 500 +D) 500-400 MG-UNIT TABS Take 500 mg by mouth daily  Qty: 90 tablet, Refills: 1    Associated Diagnoses: Malnutrition (H)      carBAMazepine (TEGRETOL) 200 MG tablet Take 1 tablet (200 mg) by mouth 2 times daily  Qty: 120 tablet, Refills: 11    Associated Diagnoses: Partial symptomatic epilepsy with complex partial seizures, not intractable, without status epilepticus (H)      cyanocobalamin 1000 MCG TABS Take 1 tablet by mouth daily      ferrous sulfate (IRON) 325 (65 FE) MG tablet Take 1 tablet (325 mg) by mouth 2 times daily  Qty: 200 tablet, Refills: 3    Associated Diagnoses: Other iron deficiency anemia      folic acid (FOLVITE) 1 MG tablet Take 1 tablet (1 mg) by mouth daily  Qty: 30 tablet, Refills: 0    Associated Diagnoses: Protein-calorie malnutrition, unspecified severity (H)      furosemide (LASIX) 20 MG tablet Take 1 tablet (20 mg) by mouth daily  Qty: 30 tablet, Refills: 3    Associated Diagnoses: Other ascites      gabapentin (NEURONTIN) 100 MG capsule Take 1 capsule (100 mg) by mouth At Bedtime  Qty: 30 capsule, Refills: 3    Associated Diagnoses: Mild episode of recurrent major depressive disorder (H)      lactulose (CHRONULAC) 10 GM/15ML solution Take 30 mLs (20 g) by mouth 3 times daily  Qty: 946 mL, Refills: 3    Comments: Titrate lactulose to 3-4 bowel movements per day  Associated Diagnoses: Hepatic encephalopathy (H)      levothyroxine (SYNTHROID/LEVOTHROID) 88 MCG tablet Take 1 tablet (88 mcg) by mouth daily  Qty: 90 tablet, Refills: 1    Associated Diagnoses: Hypothyroidism, unspecified type       methylphenidate (RITALIN) 10 MG tablet Take 1 tablet (10 mg) by mouth 2 times daily  Qty: 60 tablet, Refills: 0    Associated Diagnoses: Attention deficit hyperactivity disorder (ADHD), unspecified ADHD type      mirtazapine (REMERON) 45 MG tablet Take 1 tablet (45 mg) by mouth At Bedtime  Qty: 30 tablet, Refills: 2    Associated Diagnoses: Major depressive disorder with single episode, in partial remission (H)      multivitamin, therapeutic with minerals (THERA-VIT-M) TABS Take 1 tablet by mouth 2 times daily      omeprazole (PRILOSEC) 20 MG CR capsule Take 2 capsules (40 mg) by mouth 2 times daily  Qty: 360 capsule, Refills: 3    Associated Diagnoses: Gastroesophageal reflux disease without esophagitis      pravastatin (PRAVACHOL) 80 MG tablet Take 1 tablet (80 mg) by mouth daily  Qty: 90 tablet, Refills: 3    Associated Diagnoses: High cholesterol      spironolactone (ALDACTONE) 25 MG tablet Take 2 tablets (50 mg) by mouth daily  Qty: 180 tablet, Refills: 1    Associated Diagnoses: Other ascites      thiamine (VITAMIN B-1) 100 MG tablet Take 1 tablet (100 mg) by mouth daily  Qty: 100 tablet, Refills: 1    Associated Diagnoses: Alcoholic cirrhosis of liver with ascites (H)      traZODone (DESYREL) 50 MG tablet Take 1 tablet (50 mg) by mouth nightly as needed for sleep  Qty: 30 tablet, Refills: 2    Associated Diagnoses: Primary insomnia      Vitamin D, Cholecalciferol, 1000 units TABS Take 1 tablet by mouth daily      XIFAXAN 550 MG TABS tablet TAKE ONE TABLET BY MOUTH TWICE DAILY  Qty: 60 tablet, Refills: 11    Comments: Please consider 90 day supplies to promote better adherence  Associated Diagnoses: Hepatic encephalopathy (H)      blood glucose monitoring (ONETOUCH ULTRA) test strip Use to test blood sugars 4 times daily as needed or as directed.  Qty: 400 each, Refills: 1    Associated Diagnoses: Diabetes mellitus, type 2 (H)      CANE, ANY MATERIAL One cane  Qty: 1 each, Refills: 0    Associated Diagnoses:  "Balance disorder      ciclopirox (LOPROX) 0.77 % cream Apply topically 2 times daily To feet and toenails.  Qty: 90 g, Refills: 4    Associated Diagnoses: Tinea pedis of both feet      HYDROcodone-acetaminophen (NORCO) 5-325 MG per tablet Take 2 tablets by mouth every 6 hours as needed for moderate to severe pain  Qty: 60 tablet, Refills: 0    Associated Diagnoses: Brain tumor (H)      hydrOXYzine (ATARAX) 25 MG tablet Take 1 tablet (25 mg) by mouth 2 times daily  Qty: 180 tablet, Refills: 1    Associated Diagnoses: Itching; Anxiety      lidocaine (LMX4) 4 % CREA cream Apply topically once as needed for mild pain  Qty: 133 g, Refills: 1    Associated Diagnoses: Port catheter in place      ONE TOUCH LANCETS MISC by In Vitro route 4 times daily as needed  Qty: 100 each, Refills: prn    Comments: As of January 1 pt's insurance will only cover onetouch or accu check.  Patient needs a  new glucometer (one touch), too  Associated Diagnoses: Type II or unspecified type diabetes mellitus without mention of complication, not stated as uncontrolled      order for DME Equipment being ordered: manual wheelchair and cushion    [employer] light weight Sx5 18x18, 17.5 inch floor height and a 2 inch 18x18 inch cushion    length of need : lifetime        Patient's height : 5/10\", weight : 172 pounds  Qty: 1 Device, Refills: 0    Associated Diagnoses: Falls frequently; At high risk for falls; Imbalance; Limited mobility           Allergies   Allergies   Allergen Reactions     Latex Itching and Rash     No Clinical Screening - See Comments      Coban and Surgilast cause itching     Tegaderm Transparent Dressing (Informational Only)        "

## 2018-11-08 NOTE — PLAN OF CARE
Problem: Patient Care Overview  Goal: Plan of Care/Patient Progress Review  Outcome: No Change  Pt A & O to person and place. May be base line, wife says he is forgetful/confused. VSS on RA. Up to bedside commode with assist of 2. No BM on shift. Slept throughout night. No complaints of pain. On lactulose protocol. Continue with plan of care.

## 2018-11-08 NOTE — PROVIDER NOTIFICATION
Update from lab: ascites fluid culture growing anaerobic bacteria. MD Natividad notifed via text page

## 2018-11-08 NOTE — PLAN OF CARE
Problem: Patient Care Overview  Goal: Plan of Care/Patient Progress Review  Outcome: No Change  Pt A & O to person and place only at start of shift. Pt was able to state his birth year but not the date and month. VSS. Up to bedside commode with assist of 2. Pt had a number of BM. 3 episodes of stool incontinence and up to commode twice. Pt received pm lactulose. AT about 5 hours into shift, pt was able to name the president and state his birthday in full. Still does not know what the date is. No complaints of pain. Continue with plan of care.

## 2018-11-08 NOTE — PLAN OF CARE
Problem: Patient Care Overview  Goal: Plan of Care/Patient Progress Review  Outcome: Adequate for Discharge Date Met: 11/08/18  Patient discharged to home. Port flushed & de-accessed. Discharge paperwork reviewed with wife. Belongings packed by family. Patient left floor approx 1235 via wheelchair with family present.

## 2018-11-08 NOTE — PROGRESS NOTES
Care Coordinator - Discharge Planning    Admission Date/Time:  11/1/2018  Attending MD:  Kenney Florez DO     Data  Date of initial CC assessment:    Chart reviewed, discussed with interdisciplinary team.   Patient was admitted for:   1. Alcoholic cirrhosis of liver with ascites (H)    2. Hepatic encephalopathy (H)    3. Alcoholic liver failure (H)    4. Other ascites    5. Bacteremia    6. Dermatitis         Assessment   Full assessment completed in previous note    Coordination of Care and Referrals: Provided patient/family with options for discharge  Patient medically ready to discharge home today. PT worked with patient this morning and is cleared for home with home care. [facility] liaison updated. [facility] notified of discharge plan. Patient has already gotten todays dose of IV rocephin. Family to provide transportation home.       Plan  Anticipated Discharge Date:  Today  Anticipated Discharge Plan:  Home with services        Kyleigh Aburto RN

## 2018-11-08 NOTE — PLAN OF CARE
Problem: Patient Care Overview  Goal: Plan of Care/Patient Progress Review  Discharge Planner PT 5B  Patient plan for discharge: home with assistance  Current status: Pt demonstrates SBA during supine to sit and sit to stand transfer from bed to FWW. Pt ambulated ~125ft using FWW and SBA/CGA demonstrating steady gait with AE. Pt denies LE sitting exercises.   Barriers to return to prior living situation: activity tolerance, fatigue  Recommendations for discharge: Home with 24 hr assistance  Rationale for recommendations: Presently pt demonstrates safe ambulation with FWW and completion of 18 stairs in prior PT sessions. Pt appropriate for home discharge with 24 hr assistance and continuation of established services PT/OT/Speech.        Entered by: Mono Larsen 11/08/2018 8:35 AM

## 2018-11-08 NOTE — PLAN OF CARE
Problem: Patient Care Overview  Goal: Plan of Care/Patient Progress Review  Occupational Therapy Discharge Summary    Reason for therapy discharge:    Discharged to / resumptions of cares and assist    Progress towards therapy goal(s). See goals on Care Plan in [facility] electronic health record for goal details.  Goals partially met.  Barriers to achieving goals:   discharge from facility.    Therapy recommendation(s):    Continue home exercise program.and Continued assist from care at home.

## 2018-11-09 NOTE — TELEPHONE ENCOUNTER
Health Call Center    Phone Message    May a detailed message be left on voicemail: yes    Reason for Call: Other: Arsalan Almazan from  Home Care Called to say that she needs all orders for pt to resume home care, Arsalan Almazan needs a callback asap @ [phone].      Action Taken: Message routed to:  [facility] ([facility]  [facility] med      Verbal order given and documented. Tiffanie Lentz LPN 11/9/2018 10:27 AM

## 2018-11-09 NOTE — PROGRESS NOTES
Patient has clinic visit within 24-48 hours of Discharge so no post DC follow up call is needed    11/12/2018 Status: [facility]    Time: 11:50 AM Length: 30     Visit Type: UMP RETURN [[id number]]   TOMEKA:     Provider: King Louis MD Department: [facility] FAMILY MED   Special Needs:   Comments:

## 2018-11-09 NOTE — TELEPHONE ENCOUNTER
Verbal orders given to Arsalan Almazan from [facility], per Dr. Louis, for all orders for pt to resume home care, Arsalan Almazan needs a callback asap @ [phone].  Tiffanie Lentz LPN 11/9/2018 10:28 AM

## 2018-11-09 NOTE — PROGRESS NOTES
This is a recent snapshot of the patient's [facility] medical record.  For current drug dose and complete information and questions, call [phone] or In [facility] pool, fv home infusion ([id number])  CSN Number:  [id number]

## 2018-11-12 NOTE — NURSING NOTE
Chief Complaint   Patient presents with     Hospital F/U     Patient is here for hospital follow up and labs rechecked       Miky Reynolds CMA ([facility]) at 12:04 PM on 11/12/2018

## 2018-11-12 NOTE — PROGRESS NOTES
[facility]  Primary Care Center   King Louis MD  11/12/2018      Chief Complaint:   Hospital F/U       History of Present Illness:   Mono Varghese is a 48 year old male with hepatic encephalopathy, hypothyroidism, diabetes mellitus type 2, hyperlipidemia, obstructive sleep apnea, gastroesophageal reflux disease, chronic pain, and partial epilepsy who presents for a hospital follow up.   On 11/01, Mono was evaluated in the emergency department for confusion,generalized weakness and elevated ammonia consistent with hepatic encephalopathy. The episode occurred 2 days after he completed an antibiotic course for treatment of E coli bacteriemia. He had several labs and imaging performed with the results included below.  Although, his etiology was felt to be infectious,patient was started on IV antibiotics and he was treated with the [place] protocol with improvement in encephalopathy. He was given a 2 week course of ceftriaxone, lactulose for constipation, and was told to continue lasix and spironolactone at home.   Today, he notes that he took his 4th dose of his antibiotic today and his strength has increased. He has not had any seizures. His bowel movements have return to normal and he has had a normal appetite. Notably, he gets his stomach drained tomorrow.        Review of Systems:   Pertinent items are noted in HPI, remainder of complete ROS is negative.      Active Medications:      Calcium Carb-Cholecalciferol (CALCIUM 500 +D) 500-400 MG-UNIT TABS, Take 500 mg by mouth daily, Disp: 90 tablet, Rfl: 1     carBAMazepine (TEGRETOL) 200 MG tablet, Take 1 tablet (200 mg) by mouth 2 times daily, Disp: 120 tablet, Rfl: 11     cefTRIAXone (ROCEPHIN) 2 GM vial, Inject 2 g into the vein every 24 hours for 9 days, Disp: 180 mL, Rfl: 0     ciclopirox (LOPROX) 0.77 % cream, Apply topically 2 times daily To feet and toenails. (Patient taking differently: Apply topically 2 times daily as needed To feet and  toenails.), Disp: 90 g, Rfl: 4     cyanocobalamin 1000 MCG TABS, Take 1 tablet by mouth daily, Disp: , Rfl:      ferrous sulfate (IRON) 325 (65 FE) MG tablet, Take 1 tablet (325 mg) by mouth 2 times daily, Disp: 200 tablet, Rfl: 3     folic acid (FOLVITE) 1 MG tablet, Take 1 tablet (1 mg) by mouth daily, Disp: 30 tablet, Rfl: 0     furosemide (LASIX) 20 MG tablet, Take 1 tablet (20 mg) by mouth daily, Disp: 30 tablet, Rfl: 3     gabapentin (NEURONTIN) 100 MG capsule, Take 1 capsule (100 mg) by mouth At Bedtime, Disp: 30 capsule, Rfl: 3     HYDROcodone-acetaminophen (NORCO) 5-325 MG per tablet, Take 2 tablets by mouth every 6 hours as needed for moderate to severe pain, Disp: 60 tablet, Rfl: 0     hydrOXYzine (ATARAX) 25 MG tablet, Take 1 tablet (25 mg) by mouth 2 times daily (Patient taking differently: Take 25 mg by mouth 2 times daily as needed for itching ), Disp: 180 tablet, Rfl: 1     lactulose (CHRONULAC) 10 GM/15ML solution, Take 30 mLs (20 g) by mouth 3 times daily (Patient taking differently: Take 60 mLs by mouth daily ), Disp: 946 mL, Rfl: 3     levothyroxine (SYNTHROID/LEVOTHROID) 88 MCG tablet, Take 1 tablet (88 mcg) by mouth daily, Disp: 90 tablet, Rfl: 1     lidocaine (LMX4) 4 % CREA cream, Apply topically once as needed for mild pain, Disp: 133 g, Rfl: 1     methylphenidate (RITALIN) 10 MG tablet, Take 1 tablet (10 mg) by mouth 2 times daily, Disp: 60 tablet, Rfl: 0     miconazole (MICATIN; MICRO GUARD) 2 % powder, Apply topically 2 times daily To groin as needed, Disp: 43 g, Rfl: 1     mirtazapine (REMERON) 45 MG tablet, Take 1 tablet (45 mg) by mouth At Bedtime, Disp: 30 tablet, Rfl: 2     multivitamin, therapeutic with minerals (THERA-VIT-M) TABS, Take 1 tablet by mouth 2 times daily, Disp: , Rfl:      omeprazole (PRILOSEC) 20 MG CR capsule, Take 2 capsules (40 mg) by mouth 2 times daily, Disp: 360 capsule, Rfl: 3     pravastatin (PRAVACHOL) 80 MG tablet, Take 1 tablet (80 mg) by mouth daily, Disp:  90 tablet, Rfl: 3     spironolactone (ALDACTONE) 25 MG tablet, Take 2 tablets (50 mg) by mouth daily, Disp: 180 tablet, Rfl: 1     thiamine (VITAMIN B-1) 100 MG tablet, Take 1 tablet (100 mg) by mouth daily, Disp: 100 tablet, Rfl: 1     traZODone (DESYREL) 50 MG tablet, Take 1 tablet (50 mg) by mouth nightly as needed for sleep, Disp: 30 tablet, Rfl: 2     Vitamin D, Cholecalciferol, 1000 units TABS, Take 1 tablet by mouth daily, Disp: , Rfl:      XIFAXAN 550 MG TABS tablet, TAKE ONE TABLET BY MOUTH TWICE DAILY, Disp: 60 tablet, Rfl: 11      Allergies:   Latex; No clinical screening - see comments; and Tegaderm transparent dressing (informational only)      Past Medical History:  Alcoholic cirrhosis of liver with ascites   Hepatic encephalopathy   Liver failure   Hypothyroidism   Portal vein thrombosis   Diabetes mellitus type 2   Pancytopenia   Hyperlipidemia   Obesity   Obstructive sleep apnea   Portal hypertensive gastropathy   Gastroesophageal reflux disease   Gastrointestinal bleeding   Rectal bleeding   Oligoastrocytoma   Chronic pain   Partial epilepsy with impairment of consciousness   ADHD   Depressive disorder   Myopic astigmatism of both eyes   Lateral ear ringing     Past Surgical History:  Esophagoscopy, gastroscopy, duodenoscopy, combined x8   Optical tracking system craniotomy, excise tumor, combined   Right hand surgery     Family History:    Cirrhosis      Social History:   The patient was accompanied to the appointment by: mother in law.  Smoking Status: never  Smokeless Tobacco: never   Alcohol Use: no     Physical Exam:   /88 (BP Location: Left arm, Patient Position: Sitting, Cuff Size: Adult Regular)  Pulse 74  Temp 97.4  F (36.3  C) (Oral)  Resp 18  SpO2 94%   Constitutional: Awake. Conversive. Alert. In no distress.  Head: Normocephalic.   Gastrointestinal: Protrudant but soft and nontender. Abdomen soft. BS normal. No masses or organomegaly.  Psychiatric: Mentation appears normal.  Normal affect.     Recent labs:   Component      Latest Ref Rng & Units 11/1/2018   WBC      4.0 - 11.0 10e9/L 2.7 (L)   RBC Count      4.4 - 5.9 10e12/L 3.43 (L)   Hemoglobin      13.3 - 17.7 g/dL 11.1 (L)   Hematocrit      40.0 - 53.0 % 33.0 (L)   MCV      78 - 100 fl 96   MCH      26.5 - 33.0 pg 32.4   MCHC      31.5 - 36.5 g/dL 33.6   RDW      10.0 - 15.0 % 15.7 (H)   Platelet Count      150 - 450 10e9/L 48 (LL)   Diff Method       Automated Method   % Neutrophils      % 72.9   % Lymphocytes      % 12.0   % Monocytes      % 9.4   % Eosinophils      % 4.9   % Basophils      % 0.4   % Immature Granulocytes      % 0.4   Nucleated RBCs      0 /100 0   Absolute Neutrophil      1.6 - 8.3 10e9/L 2.0   Absolute Lymphocytes      0.8 - 5.3 10e9/L 0.3 (L)   Absolute Monocytes      0.0 - 1.3 10e9/L 0.3   Absolute Eosinophils      0.0 - 0.7 10e9/L 0.1   Absolute Basophils      0.0 - 0.2 10e9/L 0.0   Abs Immature Granulocytes      0 - 0.4 10e9/L 0.0   Absolute Nucleated RBC       0.0   Platelet Estimate       Confirming automated cell count   Sodium      133 - 144 mmol/L 136   Potassium      3.4 - 5.3 mmol/L 3.2 (L)   Chloride      94 - 109 mmol/L 107   Carbon Dioxide      20 - 32 mmol/L 22   Anion Gap      3 - 14 mmol/L 7   Glucose      70 - 99 mg/dL 168 (H)   Urea Nitrogen      7 - 30 mg/dL 16   Creatinine      0.66 - 1.25 mg/dL 1.03   GFR Estimate      >60 mL/min/1.7m2 76   GFR Estimate If Black      >60 mL/min/1.7m2 >90   Calcium      8.5 - 10.1 mg/dL 8.2 (L)   Bilirubin Total      0.2 - 1.3 mg/dL 1.6 (H)   Albumin      3.4 - 5.0 g/dL 2.8 (L)   Protein Total      6.8 - 8.8 g/dL 6.4 (L)   Alkaline Phosphatase      40 - 150 U/L 339 (H)   ALT      0 - 70 U/L 30   AST      0 - 45 U/L 37   Specimen Description       Blood Unspecified Site   Special Requests       Received in aerobic bottle only   Culture Micro       No growth   INR      0.86 - 1.14 1.51 (H)   PTT      22 - 37 sec 42 (H)   Ammonia      10 - 50 umol/L 111 (HH)    CRP Inflammation      0.0 - 8.0 mg/L 17.0 (H)   Procalcitonin      ng/ml 0.13   TSH      0.40 - 4.00 mU/L 5.65 (H)   T4 Free      0.76 - 1.46 ng/dL 0.90     Component      Latest Ref Rng & Units 11/1/2018   Color Urine       Dark Yellow   Appearance Urine       Clear   Glucose Urine      NEG:Negative mg/dL Negative   Bilirubin Urine      NEG:Negative Negative   Ketones Urine      NEG:Negative mg/dL Negative   Specific Gravity Urine      1.003 - 1.035 1.017   Blood Urine      NEG:Negative Negative   pH Urine      5.0 - 7.0 pH 6.5   Protein Albumin Urine      NEG:Negative mg/dL 10 (A)   Urobilinogen mg/dL      0.0 - 2.0 mg/dL 2.0   Nitrite Urine      NEG:Negative Negative   Leukocyte Esterase Urine      NEG:Negative Negative   Source       Catheterized Urine   WBC Urine      0 - 5 /HPF 2   RBC Urine      0 - 2 /HPF 0   Mucous Urine      NEG:Negative /LPF Present (A)   Hyaline Casts      0 - 2 /LPF 2   Calcium Oxalate      NEG:Negative /HPF Few (A)   Specimen Description       Catheterized Urine   Special Requests       Specimen received in preservative   Culture Micro       No growth     Component      Latest Ref Rng & Units 11/1/2018   Ph Venous      7.32 - 7.43 pH 7.40   PCO2 Venous      40 - 50 mm Hg 29 (L)   PO2 Venous      25 - 47 mm Hg 43   Bicarbonate Venous      21 - 28 mmol/L 18 (L)   O2 Sat Venous      % 79   Lactic Acid      0.7 - 2.1 mmol/L 1.4   Specimen Description       Blood Right Arm   Special Requests       Received in aerobic bottle only   Culture Micro       No growth     Component      Latest Ref Rng & Units 11/1/2018          10:20 PM   Body Fluid Analysis Source       Ascites   Color Fluid       Yellow   Appearance Fluid       Slightly Cloudy   WBC Fluid      /uL 82   % Neutrophils Fluid      % 14   % Lymphocytes Fluid      % 25   % Eosinophils Fluid      % 1   % Other Cells Fluid      % 60   Specimen Description       Ascites Fluid   Special Requests          Culture Micro       No growth    Gram Stain Smear          Albumin Fluid Source       Ascites   Albumin Fluid      g/dL 0.4   Protein Total Fluid Source       Ascites   Protein Total Fluid      g/dL 0.7   Glucose Fluid Source       Ascites   Glucose Fluid      mg/dL 186     Recent imaging:   Exam: XR CHEST PORT 1 VW, 11/1/2018 5:34 PM  Impression: No acute airspace opacity.     I have personally reviewed the examination and initial interpretation  and I agree with the findings.     ELIAS RABAGO MD    EXAMINATION: DOPPLER VENOUS ULTRASOUND OF BILATERAL LOWER EXTREMITIES,  11/3/2018 11:08 AM     IMPRESSION:  No evidence of deep venous thrombosis in either lower extremity.     I have personally reviewed the examination and initial interpretation  and I agree with the findings.     DAYA SHAW MD    EXAMINATION: US ABDOMEN COMPLETE WITH DOPPLER COMPLETE 11/3/2018 3:31  PM   Impression:   1.  Cirrhotic configuration of the liver similar to prior. No focal  mass.  2.  Unchanged nonocclusive thrombus in the main portal vein with  antegrade flow and Doppler velocities detailed above. Known left  portal vein thrombus not visualized on this exam.  3.  Patent arteries and veins in the remainder of the Doppler exam  with velocities as detailed above.  4.  Large volume ascites.  5.  Small bilateral pleural effusions.  6.  Splenomegaly.  7.  Cholelithiasis with wall thickening the gallbladder. Clinical  correlation for acute cholecystitis.     I have personally reviewed the examination and initial interpretation  and I agree with the findings.     BRIAN WORLEY MD    Exam: XR ABDOMEN PORT 1 VW, 11/3/2018 6:10 PM  Impression: Multiple loops of moderately distended large and small  bowel, most likely representing ileus.     I have personally reviewed the examination and initial interpretation  and I agree with the findings.     BRIAN WORLEY MD    EXAMINATION: CT ABDOMEN PELVIS W CONTRAST, 11/4/2018 1:03 PM  IMPRESSION:   1. Cirrhosis with  findings of portal hypertension including large  ascites, portal enteropathy, upper abdominal varices, and  splenomegaly.  2. Decreased size of the nonocclusive main portal vein thrombus seen  on MRI 5/3/2018.  3. No hydronephrosis, nephrolithiasis, or evidence of bowel  obstruction.   4. Large right inguinal hernia containing ascites.     I have personally reviewed the examination and initial interpretation  and I agree with the findings.     DAYA SHAW MD    Assessment and Plan:  Bacteremia  History of bacteremia. We will set up blood cultures that were requested by the emergency department in his visit summary. He will return to the clinic in a week.   - Blood culture  - Blood culture  - CBC with platelets differential  - Comprehensive metabolic panel     Hepatic encephalopathy:   Mother in law who is a retired nurse practitioner and he she will monitor him for any worsened symptoms. She will contact me if she needs to. We will check CBC and metabolic function today. He is going in for a paratenesis tomorrow. I reviewed his hospital notes.     Movement disorder:   His mother in law notes that he is off his dilantin and he is having less palsy like movements.     Follow-up: Return in one week        Scribe Disclosure:   I, Taty Olivia, am serving as a scribe to document services personally performed by King Louis MD at this visit, based upon the provider's statements to me. All documentation has been reviewed by the aforementioned provider prior to being entered into the official medical record.     Portions of this medical record were completed by a scribe. UPON MY REVIEW AND AUTHENTICATION BY ELECTRONIC SIGNATURE, this confirms (a) I performed the applicable clinical services, and (b) the record is accurate. King Louis MD

## 2018-11-12 NOTE — MR AVS SNAPSHOT
After Visit Summary   11/12/2018    Mono Varghese    MRN: [id number]           Patient Information     Date Of Birth          1970        Visit Information        Provider Department      11/12/2018 11:50 AM King Louis MD [facility]        Today's Diagnoses     Bacteremia    -  1       Follow-ups after your visit        Your next 10 appointments already scheduled     Nov 21, 2018 10:45 AM CST   MR ABDOMEN W/O & W CONTRAST with UC1   [facility] ([facility])    [address] [place] [address] [phone]           How do I prepare for my exam? (Food and drink instructions) Do not eat or drink for 6 hours prior to exam. **If you will be receiving sedation or general anesthesia, please see special notes below.**  How do I prepare for my exam? (Other instructions) Take your medicines as usual, unless your doctor tells you not to. You may or may not receive IV contrast for this exam pending the discretion of the Radiologist.  **If you will be receiving sedation or general anesthesia, please see special notes below.**  What should I wear: The MRI machine uses a strong magnet. Please wear clothes without metal (snaps, zippers). A sweatsuit works well, or we may give you a hospital gown. Please remove any body piercings and hair extensions before you arrive. You will also remove watches, jewelry, hairpins, wallets, dentures, partial dental plates and hearing aids. You may wear contact lenses, and you may be able to wear your rings. We have a safe place to keep your personal items, but it is safer to leave them at home.  How long does the exam take: Most tests take 30 to 60 minutes.  HOWEVER, IF YOUR DOCTOR PRESCRIBES ANESTHESIA please plan on spending four to five hours in the recovery room.  What should I bring: Bring a list of your current medicines to your exam (including vitamins, minerals and  over-the-counter drugs). Also bring the results of similar scans you may have had.  Do I need a : **If you will be receiving sedation or general anesthesia, please see special notes below.**  What should I do after the exam: No Restrictions, You may resume normal activities.  What is this test: MRI (magnetic resonance imaging) uses a strong magnet and radio waves to look inside the body. An MRA (magnetic resonance angiogram) does the same thing, but it lets us look at your blood vessels. A computer turns the radio waves into pictures showing cross sections of the body, much like slices of bread. This helps us see any problems more clearly. You may receive fluid (called  contrast ) before or during your scan. The fluid helps us see the pictures better. We give the fluid through an IV (small needle in your arm).  Who should I call with questions: If you have any questions, please contact your Imaging Department exam site. Directions, parking instructions, and other information is available on our website, [email or web address].            Dec 03, 2018   Procedure with Guru Jose Kelly MD   [facility] [place], Same Day Surgery (--)    [address] [address]   [phone]            Jan 02, 2019  1:00 PM CST   Adult Med Follow UP with Leroy Alcaraz MD   [facility] ([facility])    [place] [id number]  [address] [id number] [phone]            Feb 13, 2019  9:00 AM CST   Lab with  LAB   [facility] ([facility])    [address] [place] [id number] [phone]            Feb 13, 2019 10:00 AM CST   (Arrive by 9:45 AM)   Return General Liver with Beatriz Tanner MD   [facility] Hepatology ([facility])    [address] [place] [address]  [address]  [facility] [id number] [phone]            Apr 26, 2019 10:00 AM CDT   MR BRAIN W/O & W  CONTRAST with UCMR1   [facility] ([facility])    [address]  1st Floor  [facility] [address] [phone]           How do I prepare for my exam? (Food and drink instructions) **If you will be receiving sedation or general anesthesia, please see special notes below.**  How do I prepare for my exam? (Other instructions) Take your medicines as usual, unless your doctor tells you not to. You may or may not receive intravenous (IV) contrast for this exam pending the discretion of the Radiologist.  You do not need to do anything special to prepare.  **If you will be receiving sedation or general anesthesia, please see special notes below.**  What should I wear: The MRI machine uses a strong magnet. Please wear clothes without metal (snaps, zippers). A sweatsuit works well, or we may give you a hospital gown. Please remove any body piercings and hair extensions before you arrive. You will also remove watches, jewelry, hairpins, wallets, dentures, partial dental plates and hearing aids. You may wear contact lenses, and you may be able to wear your rings. We have a safe place to keep your personal items, but it is safer to leave them at home.  How long does the exam take: Most tests take 30 to 60 minutes.  HOWEVER, IF YOUR DOCTOR PRESCRIBES ANESTHESIA please plan on spending four to five hours in the recovery room.  What should I bring:  Bring a list of your current medicines to your exam (including vitamins, minerals and over-the-counter drugs).  Do I need a :  **If you will be receiving sedation or general anesthesia, please see special notes below.**  What should I do after the exam: No Restrictions, You may resume normal activities.  What is this test: MRI (magnetic resonance imaging) uses a strong magnet and radio waves to look inside the body. An MRA (magnetic resonance angiogram) does the same thing, but it lets us look at your blood vessels. A computer  turns the radio waves into pictures showing cross sections of the body, much like slices of bread. This helps us see any problems more clearly. You may receive fluid (called  contrast ) before or during your scan. The fluid helps us see the pictures better. We give the fluid through an IV (small needle in your arm).  Who should I call with questions:  Please call the Imaging Department at your exam site with any questions. Directions, parking instructions, and other information is available on our website, [email or web address].  How do I prepare if I m having sedation or anesthesia? **IMPORTANT** THE INSTRUCTIONS BELOW ARE ONLY FOR THOSE PATIENTS WHO HAVE BEEN TOLD THEY WILL RECEIVE SEDATION OR GENERAL ANESTHESIA DURING THEIR MRI PROCEDURE:  IF YOU WILL RECEIVE SEDATION (take medicine to help you relax during your exam): You must get the medicine from your doctor before you arrive. Bring the medicine to the exam. Do not take it at home. Arrive one hour early. Bring someone who can take you home after the test. Your medicine will make you sleepy. After the exam, you may not drive, take a bus or take a taxi by yourself. No eating 8 hours before your exam. You may have clear liquids up until 4 hours before your exam. (Clear liquids include water, clear tea, black coffee and fruit juice without pulp.)  IF YOU WILL RECEIVE ANESTHESIA (be asleep for your exam): Arrive 1 1/2 hours early. Bring someone who can take you home after the test. You may not drive, take a bus or take a taxi by yourself. No eating 8 hours before your exam. You may have clear liquids up until 4 hours before your exam. (Clear liquids include water, clear tea, black coffee and fruit juice without pulp.)            May 03, 2019 10:45 AM CDT   (Arrive by 10:30 AM)   Return Visit with Lauro Shaw MD   [facility] ([facility])    [address]  [address]  [facility] [address] [phone]               Future tests that were ordered for you today     Open Future Orders        Priority Expected Expires Ordered    Blood culture Routine 11/12/2018 11/26/2018 11/12/2018    Blood culture Routine 11/12/2018 11/26/2018 11/12/2018    CBC with platelets differential Routine 11/12/2018 11/26/2018 11/12/2018    Comprehensive metabolic panel Routine 11/12/2018 11/26/2018 11/12/2018            Who to contact     Please call your clinic at [phone] to:    Ask questions about your health    Make or cancel appointments    Discuss your medicines    Learn about your test results    Speak to your doctor            Additional Information About Your Visit        [employer] Information     [employer] gives you secure access to your electronic health record. If you see a primary care provider, you can also send messages to your care team and make appointments. If you have questions, please call your primary care clinic.  If you do not have a primary care provider, please call [phone] and they will assist you.      [employer] is an electronic gateway that provides easy, online access to your medical records. With [employer], you can request a clinic appointment, read your test results, renew a prescription or communicate with your care team.     To access your existing account, please contact your [facility] or call [phone] for assistance.        Care EveryWhere ID     This is your Care EveryWhere ID. This could be used by other organizations to access your [place] medical records  [id number]        Your Vitals Were     Pulse Temperature Respirations Pulse Oximetry          74 97.4  F (36.3  C) (Oral) 18 94%         Blood Pressure from Last 3 Encounters:   11/12/18 127/88   11/08/18 122/83   10/24/18 101/71    Weight from Last 3 Encounters:   11/07/18 82.8 kg (182 lb 8 oz)   10/24/18 77.5 kg (170 lb 12.8 oz)   10/15/18 81.8 kg (180 lb 5.4 oz)                 Today's Medication Changes          These  changes are accurate as of 11/12/18  1:04 PM.  If you have any questions, ask your nurse or doctor.               These medicines have changed or have updated prescriptions.        Dose/Directions    ciclopirox 0.77 % cream   Commonly known as:  LOPROX   This may have changed:    - when to take this  - reasons to take this  - additional instructions   Used for:  Tinea pedis of both feet        Apply topically 2 times daily To feet and toenails.   Quantity:  90 g   Refills:  4       hydrOXYzine 25 MG tablet   Commonly known as:  ATARAX   This may have changed:    - when to take this  - reasons to take this   Used for:  Itching, Anxiety        Dose:  25 mg   Take 1 tablet (25 mg) by mouth 2 times daily   Quantity:  180 tablet   Refills:  1       lactulose 10 GM/15ML solution   Commonly known as:  CHRONULAC   This may have changed:    - how much to take  - when to take this   Used for:  Hepatic encephalopathy (H)        Dose:  20 g   Take 30 mLs (20 g) by mouth 3 times daily   Quantity:  946 mL   Refills:  3                Primary Care Provider Office Phone # Fax #    King Louis [phone]       [address] [address]        Equal Access to Services     SAMUEL FAIR AH: Hadii kevin Thomas, waaxda lucarmina, qaybta kaalmada adecolinda, josue tamayo. So [facility] [phone].    ATENCIÓN: Si habla español, tiene a chiang disposición servicios gratuitos de asistencia lingüística. Manuelito al [phone].    We comply with applicable federal civil rights laws and [place] laws. We do not discriminate on the basis of race, color, national origin, age, disability, sex, sexual orientation, or gender identity.            Thank you!     Thank you for choosing [facility]  for your care. Our goal is always to provide you with excellent care. Hearing back from our patients is one way we can continue to improve our services. Please take a  few minutes to complete the written survey that you may receive in the mail after your visit with us. Thank you!             Your Updated Medication List - Protect others around you: Learn how to safely use, store and throw away your medicines at www.disposemymeds.org.          This list is accurate as of 11/12/18  1:04 PM.  Always use your most recent med list.                   Brand Name Dispense Instructions for use Diagnosis    blood glucose monitoring test strip    ONETOUCH ULTRA    400 each    Use to test blood sugars 4 times daily as needed or as directed.    Diabetes mellitus, type 2 (H)       Calcium Carb-Cholecalciferol 500-400 MG-UNIT Tabs    calcium 500 +D    90 tablet    Take 500 mg by mouth daily    Malnutrition (H)       CANE, ANY MATERIAL     1 each    One cane    Balance disorder       carBAMazepine 200 MG tablet    TEGretol    120 tablet    Take 1 tablet (200 mg) by mouth 2 times daily    Partial symptomatic epilepsy with complex partial seizures, not intractable, without status epilepticus (H)       cefTRIAXone 2 GM vial    ROCEPHIN    180 mL    Inject 2 g into the vein every 24 hours for 9 days    Bacteremia       ciclopirox 0.77 % cream    LOPROX    90 g    Apply topically 2 times daily To feet and toenails.    Tinea pedis of both feet       cyanocobalamin 1000 MCG Tabs      Take 1 tablet by mouth daily        ferrous sulfate 325 (65 Fe) MG tablet    IRON    200 tablet    Take 1 tablet (325 mg) by mouth 2 times daily    Other iron deficiency anemia       folic acid 1 MG tablet    FOLVITE    30 tablet    Take 1 tablet (1 mg) by mouth daily    Protein-calorie malnutrition, unspecified severity (H)       furosemide 20 MG tablet    LASIX    30 tablet    Take 1 tablet (20 mg) by mouth daily    Other ascites       gabapentin 100 MG capsule    NEURONTIN    30 capsule    Take 1 capsule (100 mg) by mouth At Bedtime    Mild episode of recurrent major depressive disorder (H)        "HYDROcodone-acetaminophen 5-325 MG per tablet    NORCO    60 tablet    Take 2 tablets by mouth every 6 hours as needed for moderate to severe pain    Brain tumor (H)       hydrOXYzine 25 MG tablet    ATARAX    180 tablet    Take 1 tablet (25 mg) by mouth 2 times daily    Itching, Anxiety       lactulose 10 GM/15ML solution    CHRONULAC    946 mL    Take 30 mLs (20 g) by mouth 3 times daily    Hepatic encephalopathy (H)       levothyroxine 88 MCG tablet    SYNTHROID/LEVOTHROID    90 tablet    Take 1 tablet (88 mcg) by mouth daily    Hypothyroidism, unspecified type       lidocaine 4 % Crea cream    LMX4    133 g    Apply topically once as needed for mild pain    Port catheter in place       methylphenidate 10 MG tablet    RITALIN    60 tablet    Take 1 tablet (10 mg) by mouth 2 times daily    Attention deficit hyperactivity disorder (ADHD), unspecified ADHD type       miconazole 2 % powder    MICATIN; MICRO GUARD    43 g    Apply topically 2 times daily To groin as needed    Dermatitis       mirtazapine 45 MG tablet    REMERON    30 tablet    Take 1 tablet (45 mg) by mouth At Bedtime    Major depressive disorder with single episode, in partial remission (H)       multivitamin, therapeutic with minerals Tabs tablet      Take 1 tablet by mouth 2 times daily        omeprazole 20 MG CR capsule    priLOSEC    360 capsule    Take 2 capsules (40 mg) by mouth 2 times daily    Gastroesophageal reflux disease without esophagitis       ONETOUCH LANCETS Misc     100 each    by In Vitro route 4 times daily as needed    Type II or unspecified type diabetes mellitus without mention of complication, not stated as uncontrolled       order for DME     1 Device    Equipment being ordered: manual wheelchair and cushion  [employer] light weight Sx5 18x18, 17.5 inch floor height and a 2 inch 18x18 inch cushion  length of need : lifetime    Patient's height : 5/10\", weight : 172 pounds    Falls frequently, At high risk for falls, Imbalance, " Limited mobility       pravastatin 80 MG tablet    PRAVACHOL    90 tablet    Take 1 tablet (80 mg) by mouth daily    High cholesterol       spironolactone 25 MG tablet    ALDACTONE    180 tablet    Take 2 tablets (50 mg) by mouth daily    Other ascites       thiamine 100 MG tablet     100 tablet    Take 1 tablet (100 mg) by mouth daily    Alcoholic cirrhosis of liver with ascites (H)       traZODone 50 MG tablet    DESYREL    30 tablet    Take 1 tablet (50 mg) by mouth nightly as needed for sleep    Primary insomnia       Vitamin D (Cholecalciferol) 1000 units Tabs      Take 1 tablet by mouth daily        XIFAXAN 550 MG Tabs tablet   Generic drug:  rifaximin     60 tablet    TAKE ONE TABLET BY MOUTH TWICE DAILY    Hepatic encephalopathy (H)

## 2018-11-13 NOTE — PROGRESS NOTES
This is a recent snapshot of the patient's [facility] medical record.  For current drug dose and complete information and questions, call [phone] or In [facility] pool, fv home infusion ([id number])  CSN Number:  [id number]

## 2018-11-15 NOTE — TELEPHONE ENCOUNTER
[facility] Call Center    Phone Message    May a detailed message be left on voicemail: yes    Reason for Call: Order(s): Home Care Orders: Physical Therapy (PT): once a week for three weeks, strengthening home saftey     Action Taken: Message routed to:  [facility] PCC     Verbal order given and documented. Tiffanie Lentz LPN 11/15/2018 3:37 PM

## 2018-11-15 NOTE — TELEPHONE ENCOUNTER
Verbal orders given to Jihan from [facility], per Dr. Louis, for Order(s): Home Care Orders: Physical Therapy (PT): once a week for three weeks, strengthening home safety. Tiffanie Lenzt LPN 11/15/2018 3:38 PM

## 2018-11-15 NOTE — PLAN OF CARE
Problem: Patient Care Overview  Goal: Plan of Care/Patient Progress Review  Outcome: Improving  Assumed cares from 1900 to 2300  A&O to self and place, VSS on RA.  Loose BM x1, incont B&B.  Received scheduled lactulose.  Good appetite, wife brought pizza.  DOW noted.  Continue to monitor and follow POC.         9211

## 2018-11-16 NOTE — PROGRESS NOTES
This is a recent snapshot of the patient's [facility] medical record.  For current drug dose and complete information and questions, call [phone] or In [facility] pool, fv home infusion ([id number])  CSN Number:  [id number]

## 2018-11-19 NOTE — PROGRESS NOTES
This is a recent snapshot of the patient's [facility] medical record.  For current drug dose and complete information and questions, call [phone] or In [facility] pool, fv home infusion ([id number])  CSN Number:  [id number]

## 2018-11-20 NOTE — PROGRESS NOTES
This is a recent snapshot of the patient's [facility] medical record.  For current drug dose and complete information and questions, call [phone] or In [facility] pool, fv home infusion ([id number])  CSN Number:  [id number]

## 2018-11-20 NOTE — TELEPHONE ENCOUNTER
Verbal orders given to Carry from [facility], per Dr. Louis, for Order(s): Home Care Orders: Other: Speech therapy 2 times a month for one month and 4 times a month for one month. Tiffanie Lentz LPN 11/20/2018 10:39 AM

## 2018-11-20 NOTE — TELEPHONE ENCOUNTER
[facility] Call Center    Phone Message    May a detailed message be left on voicemail: yes    Reason for Call: Order(s): Home Care Orders: Occupational Therapy (OT): 1x a week for 1 week - 2x a week for 2 wks - Okay to leave vm    Action Taken: Message routed to:  [facility] PCC      Verbal order given and documented. Tiffanie Lentz LPN 11/20/2018 8:18 AM

## 2018-11-20 NOTE — TELEPHONE ENCOUNTER
Verbal orders given to Angelique from [facility], per Dr. Louis, for Order(s): Home Care Orders: Occupational Therapy (OT): 1x a week for 1 week - 2x a week for 2 wks. Tiffanie Lentz LPN 11/20/2018 8:19 AM

## 2018-11-20 NOTE — TELEPHONE ENCOUNTER
[facility] Call Center    Phone Message    May a detailed message be left on voicemail: yes    Reason for Call: Order(s): Home Care Orders: Other: Speech therapy 2 times a month for one month and 4 times a month for one month. Verbal orders are ok.    Action Taken: Message routed to:  [facility] Primary care      Verbal order given and documented. Tiffanie Lentz LPN 11/20/2018 10:39 AM

## 2018-11-21 NOTE — TELEPHONE ENCOUNTER
[facility] Call [facility]    Phone Message    May a detailed message be left on voicemail: yes    Reason for Call: Other: [facility] [place] calling. They need a new prescription for lactulose (CHRONULAC) 10 GM/15ML solution. Patient's wife told the pharmacist patient is currently at 60 mL twice a day. Please check & fax over a new prescription for whatever the patient is at now.      Action Taken: Message routed to:  [facility] ([facility]): Hepatology

## 2018-11-21 NOTE — TELEPHONE ENCOUNTER
[facility] Call Center    Phone Message    May a detailed message be left on voicemail: yes    Reason for Call: Medication Question or concern regarding medication   Prescription Clarification  Name of Medication: lactulose (CHRONULAC) 10 GM/15ML solution  Prescribing Provider: Per pt's Wife Yisel Tanner MD is the prescribing provder   Pharmacy:  [facility] in [place]    What on the order needs clarification?  Pt's Wife Yisel states the insurance will not cover the RX unless the dosage is increased. Please f/u with pt's Wife to further assist with their request FYI -  pt is out of meds.     Action Taken: Message routed to:  [facility]): HEP

## 2018-11-21 NOTE — TELEPHONE ENCOUNTER
Writer confirmed with patient's wife that patient is indeed taking 60mL twice daily. Yisel stated with lower dosages the patient ends up feeling very fatigues and weak.    Writer updated the prescription to reflect the 60mL twice daily and sent the Rx to the preferred pharmacy.

## 2018-11-26 NOTE — ED NOTES
Bed: ED23  Expected date:   Expected time:   Means of arrival:   Comments:  [facility]   48 M  Increasing confusion   Hx liver and brain ca   Possible elevated ammonia   Yellow

## 2018-11-26 NOTE — ED TRIAGE NOTES
Presents to ER via EMS for increased confusion and weakness that began 11/23/2018 and has worsened throughout the weekend. Per EMS, pt's wife was hospitalized on Friday and the caregiver began to report increased symptoms afterwards. Pt is confused at baseline with a hx of brain and liver cancer. Pt is disoriented to place and situation, but oriented to person. Caregiver reported to EMS that pt has not been eating well at home since 11/23.     Glucose 221 per EMS.

## 2018-11-26 NOTE — LETTER
Transition Communication Hand-off for Care Transitions to Next Level of Care Provider    Name: Mono Varghese  : 1970  MRN #: [id number]  Primary Care Provider: [place]     Primary Clinic: [address] [place] [address] 88  [facility] [id number]     Reason for Hospitalization:  Hepatic encephalopathy (H) [K72.90]  Admit Date/Time: 2018  5:44 PM  Discharge Date: 18  Payor Source: Payor: MEDICARE / Plan: MEDICARE / Product Type: Medicare /     Readmission Assessment Measure (URVASHI) Risk Score/category: Very high        Reason for Communication Hand-off Referral: Multiple providers/specialties    Discharge Plan:  TCU:  [facility]   [address] [place] [address]   [phone]  Fax [phone]       Concern for non-adherence with plan of care:   Y/N Yes  Discharge Needs Assessment:  Needs      Most Recent Value   Equipment Currently Used at Home  wheelchair, manual, walker, standard, cane, straight, shower chair, grab bar   Transportation Available  family or friend will provide          Already enrolled in Tele-monitoring program and name of program:  Unknown  Follow-up specialty is recommended: Yes    Follow-up plan:    Future Appointments   Date Time Provider Department Center   2018  2:30 PM Willis Verdin, [facility]   2018  6:00 AM Ne Saab, SLP [facility] O   2019  1:00 PM Leroy Alcaraz MD [facility]IN   2019  9:00 AM  LAB U[facility]AB [facility]   2019 10:00 AM [facility]   2019 10:00 AM UCMR1 CMRI [facility]   5/3/2019 10:45 AM Lauro Shaw MD [facility]       Any outstanding tests or procedures:        Referrals     Future Labs/Procedures    Medication Therapy Management Referral     Comments:    MTM referral reason            Patient has Lactulose or Rifaxamin as a PTA Med or a Discharge medication      Patient had a hospital or ED visit in last 6 months and has more than 10   PTA  or Discharge medications    Patient has 5 PTA or Discharge Medications AND one of the following   diagnoses: DM,HF,COPD,AMI DX,PULM HTN       This service is designed to help you get the most from your medications.  A specially trained pharmacist will work closely with you and your doctors  to solve any problems related to your medications and to help you get the   best results from taking them.      The Medication Therapy Management staff will call you to schedule an appointment.    Occupational Therapy Adult Consult     Comments:    Evaluate and treat as clinically indicated.    Reason:  Deconditioning due to prolonged hospital stay and hepatic encephalopathy    Physical Therapy Adult Consult     Comments:    Evaluate and treat as clinically indicated.    Reason:  Deconditioning due to prolonged hospital stay and hepatic encephalopathy            FRANKLYN Felix, SW  5A Unit   Pager [phone]  Phone [phone]      AVS/Discharge Summary is the source of truth; this is a helpful guide for improved communication of patient story

## 2018-11-26 NOTE — LETTER
Health Information Management Services               Recipient:  [facility]        Sender:  FRANKLYN Felix, LGSW  5A Unit   Phone [phone]          Date: December 5, 2018  Patient Name:  Mono Varghese  Routing Message:    Please consider for TCU placement. Thanks!        The documents accompanying this notice contain confidential information belonging to the sender.  This information is intended only for the use of the individual or entity named above.  The authorized recipient of this information is prohibited from disclosing this information to any other party and is required to destroy the information after its stated need has been fulfilled, unless otherwise required by state law.      If you are not the intended recipient, you are hereby notified that any disclosure, copying, distribution or action taken in reliance on the contents of these documents is strictly prohibited. If you have received this document in error, please notify [place] immediately at [phone].  You may return the document via fax ([phone]) or return mail  (Health Information Management, , [address] [place] [address]).

## 2018-11-26 NOTE — LETTER
Health Information Management Services               Recipient:  [facility]        Sender:  FRANKLYN Felix, LGSW  5A Unit   Phone [phone]          Date: December 12, 2018  Patient Name:  Mono Varghese  Routing Message:    Discharge orders. Transport scheduled for 2pm. Thanks!        The documents accompanying this notice contain confidential information belonging to the sender.  This information is intended only for the use of the individual or entity named above.  The authorized recipient of this information is prohibited from disclosing this information to any other party and is required to destroy the information after its stated need has been fulfilled, unless otherwise required by state law.      If you are not the intended recipient, you are hereby notified that any disclosure, copying, distribution or action taken in reliance on the contents of these documents is strictly prohibited. If you have received this document in error, please notify [place] immediately at [phone].  You may return the document via fax ([phone]) or return mail  (Health Information Management, , [address] [place] [address]).

## 2018-11-27 NOTE — ED PROVIDER NOTES
[place] EMERGENCY DEPARTMENT ([facility])  11/26/18   History     Chief Complaint   Patient presents with     Altered Mental Status     The history is provided by the spouse and a relative. The history is limited by the condition of the patient (AMS).     Mono Varghese is a 48 year old male with a medical history significant for left parietal astrocytoma s/p resection, alcoholic cirrhosis, hepatic encephalopathy, rectal and esophageal varices and Portal Vein Thrombosis, e. coli bacteremia, s/p recent hospitalization for hepatic encephalopathy who p/w altered mental status progressive over past 3 days.     Patient was hospitalized 11/1/2018 to 11/8/2018 for hepatic encephalopathy felt most likely to be 2/2 infection. Paracentesis performed during his hospitalization returned positive for P. Acnes; this was thought to be likely a contaminant, but he improved on empiric abx and was discharged on ceftriaxone.    Patient presents today with progressive confusion and generalized weakness for past 3 days.  The family reports that the patient did not take his lactulose on 11/22/2018 (Thanksgiving), but has been taking it since then. He has been having adequate stool output with lactulose. He completed his course of ceftriaxone and had repeat blood cultures done on 11/23/2018 that have shown no growth after 3 days.  Patient otherwise denies any recent falls, headaches, dysuria, abdominal pain, chest pain, fevers, or back pain.  He has a dry cough, but the family reports that this is a chronic issue for him. Family does note that the patient has had yellowing of his skin over the past couple of days.    I have reviewed the Medications, Allergies, Past Medical and Surgical History, and Social History in the Epic system.    Review of Systems   Unable to perform ROS: Mental status change   Constitutional: Positive for activity change, appetite change and fatigue. Negative for chills and fever.   Respiratory:  Positive for cough. Negative for shortness of breath.    Cardiovascular: Negative for chest pain.   Gastrointestinal: Positive for abdominal distention and diarrhea. Negative for abdominal pain, anal bleeding, blood in stool, constipation, nausea and vomiting.   Genitourinary: Negative for dysuria.   Musculoskeletal: Negative for back pain and neck stiffness.   Skin: Positive for color change. Negative for rash.   Neurological: Positive for weakness. Negative for headaches.   Psychiatric/Behavioral: Positive for confusion.       Physical Exam   BP: 111/76  Heart Rate: 85  Temp: 97.6  F (36.4  C)  Resp: 18  Weight: 76.5 kg (168 lb 9.6 oz)  SpO2: 98 %      Physical Exam   Constitutional: He appears cachectic. No distress.   HENT:   Head: Atraumatic.   Nose: Nose normal.   Mouth/Throat: Mucous membranes are dry.   Eyes: Pupils are equal, round, and reactive to light. Scleral icterus is present.   Neck: Normal range of motion. Neck supple.   Cardiovascular: Normal rate, regular rhythm, normal heart sounds and intact distal pulses.    No murmur heard.  Pulmonary/Chest: Effort normal and breath sounds normal. No stridor. No respiratory distress. He has no wheezes. He has no rales.   Abdominal: Soft. He exhibits ascites. He exhibits no distension. There is no tenderness. There is no rebound and no guarding. No hernia.   Musculoskeletal: He exhibits no deformity.   Neurological: He is alert. He displays atrophy. He displays no tremor. He displays no seizure activity.   +asterixis    Skin: Skin is warm and dry. No rash noted. He is not diaphoretic.   Psychiatric: He is slowed and withdrawn. He is inattentive.   Nursing note and vitals reviewed.      ED Course   6:46 PM  The patient was seen and examined by Ladi Vazquez MD in Room ED23.     ED Course     Procedures             Critical Care time:  none             Labs Ordered and Resulted from Time of ED Arrival Up to the Time of Departure from the ED   CBC WITH PLATELETS  DIFFERENTIAL - Abnormal; Notable for the following:        Result Value    WBC 3.9 (*)     RBC Count 3.78 (*)     Hemoglobin 12.3 (*)     Hematocrit 36.5 (*)     Platelet Count 41 (*)     Absolute Lymphocytes 0.4 (*)     All other components within normal limits   COMPREHENSIVE METABOLIC PANEL - Abnormal; Notable for the following:     Glucose 171 (*)     Creatinine 1.62 (*)     GFR Estimate 45 (*)     GFR Estimate If Black 55 (*)     Calcium 8.3 (*)     Albumin 3.3 (*)     Alkaline Phosphatase 279 (*)     All other components within normal limits   AMMONIA - Abnormal; Notable for the following:     Ammonia 148 (*)     All other components within normal limits   ROUTINE UA WITH MICROSCOPIC REFLEX TO CULTURE - Abnormal; Notable for the following:     Protein Albumin Urine 10 (*)     Mucous Urine Present (*)     Hyaline Casts 7 (*)     Calcium Oxalate Few (*)     All other components within normal limits   INR - Abnormal; Notable for the following:     INR 1.54 (*)     All other components within normal limits   TSH WITH FREE T4 REFLEX - Abnormal; Notable for the following:     TSH 7.21 (*)     All other components within normal limits   ISTAT  GASES LACTATE CALI POCT - Abnormal; Notable for the following:     PCO2 Venous 32 (*)     Bicarbonate Venous 19 (*)     All other components within normal limits   T4 FREE   T4 FREE   PROCALCITONIN   ISTAT TROPONIN NURSING POCT   ISTAT CG4 GASES LACTATE CALI NURSING POCT   PERIPHERAL IV CATHETER   TROPONIN POCT   BLOOD CULTURE   BLOOD CULTURE            Assessments & Plan (with Medical Decision Making)   Mono Varghese is a 48 year old male with a medical history significant for left parietal astrocytoma s/p resection, alcoholic cirrhosis, hepatic encephalopathy, rectal and esophageal varices and Portal Vein Thrombosis, e. coli bacteremia, s/p recent hospitalization for hepatic encephalopathy who p/w altered mental status progressive over past 3 days.     Ddx: hepatic  encephalopathy, PNA, UTI, bacteremia, ICH, progressive portal vein thrombosis    Abd nontender. No fever or leukocytosis. Defer diagnostic paracentesis in ED. Blood cultures sent. Ammonia 148. Given lactulose and rifaximin. Lactate wnl. UA noninfected. CT head wnl. CXR clear. Vitals wnl. Admitted to medicine for ongoing management.       I have reviewed the nursing notes.    I have reviewed the findings, diagnosis, plan and need for follow up with the patient.    Current Discharge Medication List          Final diagnoses:   Hepatic encephalopathy (H)     I, Chris Foster, am serving as a trained medical scribe to document services personally performed by Ladi Vazquez MD, based on the provider's statements to me.   ILadi MD, was physically present and have reviewed and verified the accuracy of this note documented by Chris Foster.    11/26/2018   [facility], EMERGENCY DEPARTMENT     Ladi Vazquez MD  11/27/18 0238

## 2018-11-27 NOTE — PROGRESS NOTES
Care Coordinator Progress Note    Admission Date/Time:  11/26/2018  Attending MD:  Dominga Graves MD    Data  Chart reviewed, discussed with interdisciplinary team.   Patient was admitted for: Hepatic encephalopathy (H).    Concerns with insurance coverage for discharge needs: None.  Current Living Situation: Patient lives with spouse.  Support System: Supportive and Involved  Services Involved: Home Care  Transportation at Discharge: Family or friend will provide  Transportation to Medical Appointments: Wife, Yisel  Barriers to Discharge: Medical stability    Coordination of Care and Referrals: No new referrals at this time. Patient is open to [facility].    Assessment  Patient is a 48yr old male with history of left parietal astrocytoma s/p resection, ESLD c/b refractory ascites, hepatic encephalopathy, rectal and esophageal varices, PVT, hypothyroidism, who presents with encephalopathy and generalized weakness. Per chart review, patient lives locally with his wife, Yisel. When Yisel is working, her mother is able to stay with the patient to maintain 24hr safety. Per chart review, patient has a walker, cane, shower chair, and commode at home. Patient is open to [facility] for RN and HH OT, HHA and SW, resumption orders placed. RNCC to continue to follow for assistance in discharge planning.     Plan  Anticipated Discharge Date:  TBD  Anticipated Discharge Plan: Home w/resumption of [facility]    Yisel Prabhakar, ELANACC, BSN    [facility]  [address] [place] [id number]    [email or web address]    Office: [phone] Pager: [phone]  To contact weekend RNCC, dial [phone] and enter pager number [phone] at prompt. This pager can not be contacted by text page or outside line.

## 2018-11-27 NOTE — ED NOTES
[facility], [place]   ED Nurse to Floor Handoff     Mono Varghese is a 48 year old male who speaks English and lives with others,  in a home  They arrived in the ED by ambulance from home    ED Chief Complaint: Altered Mental Status    ED Dx;   Final diagnoses:   Hepatic encephalopathy (H)         Needed?: No    Allergies:   Allergies   Allergen Reactions     Latex Itching and Rash     No Clinical Screening - See Comments      Coban and Surgilast cause itching     Tegaderm Transparent Dressing (Informational Only)    .  Past Medical Hx:   Past Medical History:   Diagnosis Date     ADHD      Alcoholic cirrhosis of liver with ascites (H) 06/17/2015    cirrhosis secondary to alcohol, complicated by variceal bleeding and hepatic encephalopathy      Ascites due to alcoholic cirrhosis (H)     Requiring regular paracentesis.     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 status post subtotal resection by Dr. J Carlos Aranda in early 06/2013. Negative for 1p/19q deletions. S/P Concurrent chemoradiation July 15 through August 23, 2013. Initiated adjuvant oral temozolomide 9/17/13; switch     H/O LENA (obstructive sleep apnea)-moderate 12/18/2012    Pt states this is not currently an issue.      Hyperlipidemia LDL goal <100 8/1/2016     Hypothyroidism 8/1/2016     Liver failure (H)      Moderate major depression (H) 10/3/2012     Obesity      Pancytopenia (H) 8/1/2016    Chronic pancytopenia secondary to liver disease since at least 2012      Partial epilepsy with impairment of consciousness (H) 05/07/2014     Portal hypertensive gastropathy (H)      Portal vein thrombosis 12/18/2015    history of left lower extremity deep vein thrombosis as well as portal vein and superior mesenteric vein thrombosis, late 2015 when he was hospitalized in the ICU and seriously ill temporary IVC filter placed in 12/2015 when he was in the ICU with deep vein thromboses and active bleeding      Pulmonary nodules 6/17/2015     Rectal bleeding      Seizures (H)      Type 2 diabetes mellitus (H) 10/3/2012      Baseline Mental status: cognitively impaired HX of brain and liver ca  Current Mental Status changes: at basesline per mother patient more confused, wife feels he is @ baseline    Infection present or suspected this encounter: no  Sepsis suspected: No  Isolation type: No active isolations     Activity level - Baseline/Home:  Total Care  Activity Level - Current:   Total Care    Bariatric equipment needed?: No    In the ED these meds were given:   Medications   rifaximin (XIFAXAN) tablet 550 mg (not administered)   sodium chloride 0.9% infusion (not administered)   lactulose (CHRONULAC) solution 20 g (20 g Oral Given 11/26/18 1859)       Drips running?  No    Home pump  No    Current LDAs  Port A Cath Single 01/14/14 Right Chest wall (Active)   Access Date 11/26/18 11/26/2018  5:59 PM   Access Attempts 1 11/26/2018  5:59 PM   Gauge/Length 20 gauge;3/4 inch 11/26/2018  5:59 PM   Site Assessment WDL;Ecchymotic 11/26/2018  5:59 PM   Line Status Blood return noted;Saline locked 11/26/2018  5:59 PM   Extravasation? No 11/26/2018  5:59 PM   Dressing Intervention Transparent 11/26/2018  5:59 PM   Number of days:1777       Rash  11/03/18 2250 Bilateral groin other (see comments) (Active)   Number of days:23       Labs results:   Labs Ordered and Resulted from Time of ED Arrival Up to the Time of Departure from the ED   CBC WITH PLATELETS DIFFERENTIAL - Abnormal; Notable for the following:        Result Value    WBC 3.9 (*)     RBC Count 3.78 (*)     Hemoglobin 12.3 (*)     Hematocrit 36.5 (*)     Platelet Count 41 (*)     Absolute Lymphocytes 0.4 (*)     All other components within normal limits   COMPREHENSIVE METABOLIC PANEL - Abnormal; Notable for the following:     Glucose 171 (*)     Creatinine 1.62 (*)     GFR Estimate 45 (*)     GFR Estimate If Black 55 (*)     Calcium 8.3 (*)     Albumin 3.3 (*)     Alkaline Phosphatase 279 (*)     All other components within normal limits   AMMONIA - Abnormal; Notable for the following:     Ammonia 148 (*)     All other components within normal limits   INR - Abnormal; Notable for the following:     INR 1.54 (*)     All other components within normal limits   TSH WITH FREE T4 REFLEX - Abnormal; Notable for the following:     TSH 7.21 (*)     All other components within normal limits   ISTAT  GASES LACTATE CALI POCT - Abnormal; Notable for the following:     PCO2 Venous 32 (*)     Bicarbonate Venous 19 (*)     All other components within normal limits   ROUTINE UA WITH MICROSCOPIC REFLEX TO CULTURE   T4 FREE   T4 FREE   PROCALCITONIN   ISTAT TROPONIN NURSING POCT   ISTAT CG4 GASES LACTATE CALI NURSING POCT   PERIPHERAL IV CATHETER   TROPONIN POCT   BLOOD CULTURE   BLOOD CULTURE       Imaging Studies:   Recent Results (from the past 24 hour(s))   CT Head w/o Contrast    Narrative         Impression    Impression:    1. No acute intracranial pathology  2. Postsurgical changes of left parietal craniectomy with left  parietal resection cavity unchanged compared to CT dated 9/14/2018.   XR Chest 2 Views    Narrative    Left retrocardiac hazy opacities, atelectasis versus aspiration or  infection.        Recent vital signs:   BP (!) 116/91  Temp 97.6  F (36.4  C) (Oral)  Resp 18  SpO2 97%    Cardiac Rhythm: Normal Sinus  Pt needs tele? No  Skin/wound Issues: None    Code Status: DNR / DNI    Pain control: pt had none    Nausea control: pt had none    Abnormal labs/tests/findings requiring intervention: elevated ammonia, encephalopathy    Family present during ED course? Yes   Family Comments/Social Situation comments: Wife is currently a patient on 5B, mother @ bedside    Tasks needing completion: waiting for xifaxin from pharmacy    [phone] [facility]

## 2018-11-27 NOTE — PLAN OF CARE
Problem: Patient Care Overview  Goal: Plan of Care/Patient Progress Review  Outcome: No Change  VSS on room air. Pt unable to answer questions. Alert and opens eyes spontaneously, but unable to assess orientation. All medications held overnight as pt unable to swallow sips of water. Ammonia level yesterday evening was 148, given 3 rounds of PRN lactulose enema via [facility] protocol at 0200, 0400, and 0600. Had large liquid BMs after each enema session and rested in between. Pt beginning to follow commands and more alert after third enema, but still unable to answer questions. Belly is round and bruised from hx of repeated paracentesis. Continue to monitor LOC and follow plan of care.

## 2018-11-27 NOTE — PROGRESS NOTES
[facility], [place]    Internal Medicine Progress Note - [place] 3 Service    Main Plans for Today   - vEEG for seizure activity, neurology consult   - continue lactulose   - seizure precautions      Assessment & Plan   Mono Varghese is a 48 year old male with history of left parietal astrocytoma s/p resection, ESLD c/b refractory ascites requiring every 2 week genia (removing 8-12 L each time and receiving albumin), hepatic encephalopathy, rectal and esophageal varices and PVT (not currently on anticoagulation medication), and hypothyroidism, who presents with encephalopathy and generalized weakness.      # Encephalopathy  # Alcoholic cirrhosis c/b refractory ascites, MELD 19  Recently discharged on 11/8/18. Presenting with confusion and generalized weakness. Awake but not responsive. Ammonia 148. Exam notable for abdominal distention. Patient had been previously scheduled to undergo paracentesis today. Lactulose given in the ED. Asterixis on exam so likely HE. Asterixis improved. Diagnostic para without evidence of SBP   - F/u results of diagnostic para this PM to rule out SBP  - Nardin protocol, goal ~6 BMs every day  - Lactulose scheduled TID  - Continue PTA Rifaximin  - Consider therapeuic paracentesis once patient clears and JOSEF resolves  - F/u blood cultures, NGTD  - Daily CMP and CBC, continue to monitor   - Fluids: 3-day albumin challenge (1g/kg/day)     # Generalized weakness  # History of seizures  # Parietal astrocytoma s/p partial resection, chemo, XRT in 2012 c/b seizures    Patient presents with AMS and generalized weakness. CT head today with no acute intracranial pathology. Stable postsurgical changes are seen, no changes compared to prior CT 9/14/18. Previously noted that due to neurological structural abnormalities, pt is more so sensitive to small metabolic changes, which may precipitate encephalopathy. DDx for Mr. Varghese includes metabolic, including hepatic  encephalopathy, seizure, infection, and medication. Ammonia elevated above baseline and asterixis on exam. Patient has been taking medications including carbamazepine as prescriped. Most likely AMS is due to hepatic encephalopathy. During prior hospitalization, CT angio head/neck and MRI brain w/ and w/o contrast negative. Weakness is likely a chronic problem; myopathy was previously considered but was thought to be less likely.    - if no improvement in mental status after ammonia cleared, consider EEG for seizures, MRI for stroke evaluation   - Continue PTA carbamazepine     #JOSEF  Creatinine 1.62 on admission (baseline ~0.8). UA with not concerning for infection. Improved to 1.48 with fluids. Undergoing albumin challenge currently.   - Hold PTA lasix and spironolactone for now  - Albumin/LR bolus PRN  - Continue to trend Cr     # Malnutrition  - Continue PTA folic acid, methylphenidate, thiamine, D3  - Speech and Swallow consult           - If eventually cleared, place on high protein/KCal diet, AMS/HE limiting at this time     # Cough  Chronic non-productive cough, afebrile, CXR unremarkable on admission. Likely due to abdominal distension due to worsening ascites. Might have improvement after therapeutic paracentesis.   - continue to monitor   - consider therapeutic para when JOSEF resolved and patient more stable     # Pancytopenia  Hg 12.3, WBC 3.9, Plt 41. Have remained stable. No source of active bleeding.  - Continue to trend     Diet: NPO for Medical/Clinical Reasons Except for: Meds, Ice Chips  Fluids: NPO  Lines: PIV, port   Munoz Catheter: not present    DVT Prophylaxis: Pneumatic Compression Devices  Code Status: DNR/DNI  Expected discharge: 4 - 7 days, recommended to prior living arrangement once mental status at baseline.    The patient's care was discussed with the Attending Physician, Dr. Graves.    [place]  [facility] 3  Pager: [phone]  Please see sticky note  for cross cover information    Interval History   Mentation improved throughout the day, though disoriented x 3. Able to follow simple commands and to answer infrequently with nonsensical single word responses.     ROS complicated by encephalopathy, patient able to provide that is not in pain however.     Family updated at bedside (mother, father, wife, sister).     Physical Exam   Vital Signs: Temp: 99  F (37.2  C) Temp src: Oral BP: 108/63 Pulse: 74 Heart Rate: 73 Resp: 16 SpO2: 96 % O2 Device: None (Room air)    Weight: 168 lbs 9.6 oz  General Appearance: Awake, disoriented, cachetic   Respiratory: CTAB, comfortable on room air  Cardiovascular: RRR, no m/r/c/g  GI: distended abdomen with fluid wave, nontender to palpation,  Skin: globally jaundiced  Other: Global weakness      Data     Recent Labs  Lab 11/27/18  0604 11/26/18  1832 11/26/18  1756   WBC 2.8*  --  3.9*   HGB 11.3*  --  12.3*   MCV 98  --  97   PLT 35*  --  41*   INR  --   --  1.54*     --  133   POTASSIUM 4.0  --  4.1   CHLORIDE 108  --  105   CO2 20  --  20   BUN 30  --  30   CR 1.48*  --  1.62*   ANIONGAP 8  --  8   UCHE 8.4*  --  8.3*   *  --  171*   ALBUMIN 3.0*  --  3.3*   PROTTOTAL 6.2*  --  7.1   BILITOTAL 1.5*  --  1.3   ALKPHOS 241*  --  279*   ALT 29  --  30   AST 33  --  38   TROPONIN  --  0.01  --        Recent Results (from the past 24 hour(s))   CT Head w/o Contrast    Narrative    CT HEAD W/O CONTRAST 11/26/2018 6:27 PM    Provided History: ams, brain cancer;   ICD-10:    Comparison: MRI brain 9/16/2018. And CT head 9/14/2018    Technique: Using multidetector thin collimation helical acquisition  technique, axial, coronal and sagittal CT images from the skull base  to the vertex were obtained without intravenous contrast.     Findings:      Postsurgical changes of left parietal craniectomy with resection  cavity in the left parietal lobe. There is associated ex vacuo  dilatation of the left lateral ventricle. There is  presence of white  matter hypodensity adjacent to the resection cavity (series 2, image  19 and 20).    No intracranial hemorrhage, mass effect, or midline shift.  The gray  to white matter differentiation of the cerebral hemispheres is  preserved. The basal cisterns are patent.    The visualized paranasal sinuses are clear. The mastoid air cells are  clear.       Impression    Impression:  1. No acute intracranial pathology  2. Postsurgical changes of left parietal craniectomy with left  parietal resection cavity unchanged compared to CT dated 9/14/2018.    I have personally reviewed the examination and initial interpretation  and I agree with the findings.    SALO YU MD   XR Chest 2 Views    Narrative    XR CHEST 2 VW  11/26/2018 7:35 PM    History:  ams; .     Comparison: Chest radiograph dated 11/1/2018    Findings:   40 degree AP and the lateral view of the chest. On right Port-A-Cath  with the tip projects over right atrium, unchanged. Cardiac silhouette  is not enlarged. Pulmonary vasculature is distinct. Clear lungs. No  pneumothorax or pleural effusion. The visualized upper abdomen is  unremarkable.      Impression    IMPRESSION:  Clear lungs.    I have personally reviewed the examination and initial interpretation  and I agree with the findings.    ELIAS RABAGO MD   US Abdomen Limited w Abd/Pelvis Duplex Complete    Narrative    EXAMINATION: US ABDOMEN LIMITED WITH DOPPLER COMPLETE, 11/26/2018 9:11  PM     COMPARISON: None.    HISTORY: Evaluate for portal vein thrombosis    TECHNIQUE: The abdomen was scanned in standard fashion with  specialized ultrasound transducer(s) using both grey scale, color  Doppler, and spectral flow techniques.    Findings:    Liver: The liver is coarse in echo texture with nodular line. No  evidence of a focal hepatic mass or intrahepatic biliary ductal  dilatation.     Extrahepatic portal vein flow is antegrade, measuring 40 cm/sec.  Left portal vein flow is  antegrade, measuring 13 cm/sec.  Right portal vein flow is antegrade, measuring 17 cm/sec.    Flow in the hepatic artery is towards the liver and:  57 cm/sec peak systolic  0.74 resistive index.     The splenic vein is patent and flow is towards the liver.  The left,  middle, and right hepatic veins are patent with flow towards the IVC.  The IVC is patent with flow towards the heart.       Gallbladder: The gallbladder is contracted, but the gallbladder wall  showing nonspecific thickening of 1.1 cm. Focal echogenicity is seen  in the gallbladder, likely representing gallstone. (image 20)    Bile Ducts: Both the intra- and extrahepatic biliary system are of  normal caliber. Bile duct is not seen Pancreas: Partially visualized  with the visualized part of the pancreas are unremarkable.    Kidneys: Right kidney measures 9.7 cm and is normal in length.     Fluid: Moderate ascites.        Impression    Impression:   1. No sonographic evidence of portal vein thrombus.  2. Patent hepatic vasculature.  3. Cirrhotic liver with ascites.  4. Nonspecific gallbladder wall thickening, likely secondary to  ascites and chronic liver disease.  5. Cholelithiasis.    I have personally reviewed the examination and initial interpretation  and I agree with the findings.    ELIAS RABAGO MD     Medications     - MEDICATION INSTRUCTIONS -         albumin human  75 g Intravenous Daily     calcium carbonate-vitamin D  1 tablet Oral Daily     carBAMazepine  200 mg Oral BID     vitamin B-12  1,000 mcg Oral Daily     ferrous sulfate  325 mg Oral BID     gabapentin  100 mg Oral At Bedtime     lactulose  60 mL Oral BID     levothyroxine  88 mcg Oral QAM AC     mirtazapine  45 mg Oral At Bedtime     omeprazole  40 mg Oral BID AC     pravastatin  80 mg Oral Daily     rifaximin  550 mg Oral BID     senna-docusate  1 tablet Oral BID    Or     senna-docusate  2 tablet Oral BID     vitamin B1  100 mg Oral Daily     vitamin D3  1,000 Units Oral  Daily

## 2018-11-27 NOTE — SUMMARY OF CARE
Pt arrived to 5B with cane, red sweatshirt, grey tshirt, camo pants, shoes, wearing glasses, and socks. Also has phone and

## 2018-11-27 NOTE — PROGRESS NOTES
[facility]  Patient is currently open to home care services with [facility].  The patient is currently receiving RN,OT SW and HHA services.  [facility]  and team have been notified of patient admission.  [facility] liaison will continue to follow patient during stay.  If appropriate provide orders to resume home care at time of discharge.    Sujata Rios RN Liaison   [facility]

## 2018-11-27 NOTE — CONSULTS
[facility]  General Neurology Consult  11/27/2018      Mono Varghese MRN# [id number]   YOB: 1970 Age: 48 year old      Date of Admission:  11/26/2018    Primary care provider:   [place]    Requesting physician:   Dr. Hamm    Reason for Consult:  Encephalopathy, concerning EEG findings             History of Present Illness:       Mono Vargehse is a 48 year old male with history of oligoastrocytoma s/p left parietal lobe resection, alcoholic cirrhosis with ascites, h/o hepatic encephalopathy who was admitted 11/26/18 for encephalopathy. Neurology is consulted for encephalopathy and concerning EEG findings. History is obtained from chart review as patient is unable to provide a reliable history.     Mr. Varghese was noted to have altered mental status and generalized weakness that started around 11/24. He apparently had missed a dose of his lactulose on Thanksgiving but had restarted it since. He was reportedly taking all his other medications as prescribed. He noted abdominal pain on 11/24 but not since. He was scheduled for a paracentesis on 11/27. He was not complaining of fever, chills, headaches, dysuria; he has a dry cough but this is apparently chronic.     As noted in the H&P, he was recently admitted 11/1-8 for confusion, generalized weakness, and elevated ammonia. He improved with IV abx and paracentesis.    Due to concern for reduced mental status EEG was obtained and was concerning for BiPLEDs. Neurology was consulted for further workup and management.           Past Medical History:     Past Medical History:   Diagnosis Date     ADHD      Alcoholic cirrhosis of liver with ascites (H) 06/17/2015    cirrhosis secondary to alcohol, complicated by variceal bleeding and hepatic encephalopathy      Ascites due to alcoholic cirrhosis (H)     Requiring regular paracentesis.     Chronic pain 8/1/2016     Depressive disorder 02/01/2001      Encephalopathy, hepatic (H) 7/29/2017     GERD (gastroesophageal reflux disease)      GIB (gastrointestinal bleeding) 11/23/2015    Admitted to the ICU 11/2015 for hemorrhagic shock 2/2 variceal bleed with subsequent sequelae of shock liver, multi organ dysfunction including altered mental status of unknown etiology, multiple thrombosis, decompensated liver cirrhosis, hematologic abnormalities, and JOSEF s/p banding at the end of 03/2016      H/O Oligoastrocytoma of parietal lobe (H) 06/11/2013    Presented acute onset of right-sided seizures in 4/2013. 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 status post subtotal resection by Dr. J Carlos Aranda in early 06/2013. Negative for 1p/19q deletions. S/P Concurrent chemoradiation July 15 through August 23, 2013. Initiated adjuvant oral temozolomide 9/17/13; switch     H/O LENA (obstructive sleep apnea)-moderate 12/18/2012    Pt states this is not currently an issue.     Hyperlipidemia LDL goal <100 8/1/2016     Hypothyroidism 8/1/2016     Liver failure (H)      Moderate major depression (H) 10/3/2012     Obesity      Pancytopenia (H) 8/1/2016    Chronic pancytopenia secondary to liver disease since at least 2012      Partial epilepsy with impairment of consciousness (H) 05/07/2014     Portal hypertensive gastropathy (H)      Portal vein thrombosis 12/18/2015    history of left lower extremity deep vein thrombosis as well as portal vein and superior mesenteric vein thrombosis, late 2015 when he was hospitalized in the ICU and seriously ill temporary IVC filter placed in 12/2015 when he was in the ICU with deep vein thromboses and active bleeding      Pulmonary nodules 6/17/2015     Rectal bleeding      Seizures (H)      Type 2 diabetes mellitus (H) 10/3/2012              Past Surgical History:     Past Surgical History:   Procedure Laterality Date     ABDOMINAL PARACENTESIS  11/27/2018           COLONOSCOPY N/A 11/27/2015    Procedure: COMBINED COLONOSCOPY, SINGLE OR MULTIPLE BIOPSY/POLYPECTOMY BY BIOPSY;  Surgeon: Ran Thurston MD;  Location: [facility]     ENDOSCOPIC ULTRASOUND LOWER GASTROINTESTIONAL TRACT (GI) N/A 10/15/2018    Procedure: Rectal Endoscopic Ultrasound **Latex Allergy** with coiling and glueing of rectal varices;  Surgeon: Guru Jose Kelly MD;  Location: [facility] OR     ENDOSCOPIC ULTRASOUND UPPER GASTROINTESTINAL TRACT (GI) N/A 10/1/2018    Procedure: ENDOSCOPIC ULTRASOUND, ESOPHAGOSCOPY / UPPER GASTROINTESTINAL TRACT (GI);;  Surgeon: Guru Jose Kelly MD;  Location: [facility]     ESOPHAGOSCOPY, GASTROSCOPY, DUODENOSCOPY (EGD), COMBINED N/A 11/23/2015    Procedure: COMBINED ESOPHAGOSCOPY, GASTROSCOPY, DUODENOSCOPY (EGD);  Surgeon: Rocael Rizvi MD;  Location: [facility]     ESOPHAGOSCOPY, GASTROSCOPY, DUODENOSCOPY (EGD), COMBINED N/A 1/25/2017    Procedure: COMBINED ESOPHAGOSCOPY, GASTROSCOPY, DUODENOSCOPY (EGD), BIOPSY SINGLE OR MULTIPLE;  Surgeon: Rocael Tejada MD;  Location: [facility]     ESOPHAGOSCOPY, GASTROSCOPY, DUODENOSCOPY (EGD), COMBINED N/A 3/30/2017    Procedure: COMBINED ESOPHAGOSCOPY, GASTROSCOPY, DUODENOSCOPY (EGD);  Surgeon: Jordana Ramirez MD;  Location: [facility]     ESOPHAGOSCOPY, GASTROSCOPY, DUODENOSCOPY (EGD), COMBINED N/A 1/19/2018    Procedure: COMBINED ESOPHAGOSCOPY, GASTROSCOPY, DUODENOSCOPY (EGD);  Upper Endoscopy with verceal banding; Flexible Sigmoidoscopy;  Surgeon: Guru Jose Kelly MD;  Location: [facility]     ESOPHAGOSCOPY, GASTROSCOPY, DUODENOSCOPY (EGD), COMBINED N/A 2/12/2018    Procedure: COMBINED ESOPHAGOSCOPY, GASTROSCOPY, DUODENOSCOPY (EGD);  Esophagogastroduodenoscopy with variceal banding;  Surgeon: Guru Jose Kelly MD;  Location: [facility]     ESOPHAGOSCOPY, GASTROSCOPY, DUODENOSCOPY (EGD), COMBINED N/A 3/5/2018    Procedure: COMBINED ESOPHAGOSCOPY, GASTROSCOPY,  DUODENOSCOPY (EGD);  Esophagogastroduodenoscopy  with banding of varices Latex Allergy ;  Surgeon: Guru Jose Kelly MD;  Location: [facility]     ESOPHAGOSCOPY, GASTROSCOPY, DUODENOSCOPY (EGD), COMBINED N/A 4/16/2018    Procedure: COMBINED ESOPHAGOSCOPY, GASTROSCOPY, DUODENOSCOPY (EGD);  Upper Endoscopy  with esophageal varices banding; Latex Allergy ;  Surgeon: Guru Jose Kelly MD;  Location: [facility]     ESOPHAGOSCOPY, GASTROSCOPY, DUODENOSCOPY (EGD), COMBINED N/A 5/30/2018    Procedure: COMBINED ESOPHAGOSCOPY, GASTROSCOPY, DUODENOSCOPY (EGD);  upper endoscopy with banding of esophageal varices. *latex Allergy*;  Surgeon: Guru Jose Kelly MD;  Location:  OR     OPTICAL TRACKING SYSTEM CRANIOTOMY, EXCISE TUMOR, COMBINED  6/6/2013    Procedure: COMBINED OPTICAL TRACKING SYSTEM CRANIOTOMY, EXCISE TUMOR;  Left Stealth Guided Craniotomy , Tumor Resection ;  Surgeon: J Carlos Aranda MD;  Location:  OR     ORTHOPEDIC SURGERY      hand surgery- right     SIGMOIDOSCOPY FLEXIBLE N/A 11/24/2015    Procedure: SIGMOIDOSCOPY FLEXIBLE;  Surgeon: Rocael Rizvi MD;  Location:  GI     SIGMOIDOSCOPY FLEXIBLE N/A 1/19/2018    Procedure: SIGMOIDOSCOPY FLEXIBLE;;  Surgeon: Guru Jose Kelly MD;  Location:  OR     SIGMOIDOSCOPY FLEXIBLE N/A 10/1/2018    Procedure: SIGMOIDOSCOPY FLEXIBLE;;  Surgeon: Guru Jose Kelly MD;  Location:  OR             Social History:     Social History     Social History     Marital status:      Spouse name: Yisel      Number of children: 1      Years of education: N/A     Occupational History            Social History Main Topics     Smoking status: Never Smoker     Smokeless tobacco: Never Used     Alcohol use No      Comment: Quit 01/2014     Drug use: No     Sexual activity: Not on file     Other Topics Concern     Parent/Sibling W/ Cabg, Mi Or Angioplasty Before 65f 55m? No      Social History Narrative    On disability              Family History:     Family History   Problem Relation Age of Onset     Adopted: Yes     Medical History Unknown Mother      Cirrhosis Father      Medical History Unknown Brother      Medical History Unknown Brother      Medical History Unknown Brother              Immunizations:     Immunization History   Administered Date(s) Administered     FLU 6-35 months 01/23/2018     Influenza (IIV3) PF 10/14/2013, 10/14/2014, 11/10/2015, 10/13/2016, 10/31/2017     Influenza Vaccine IM 3yrs+ 4 Valent IIV4 09/26/2018     Pneumococcal 23 valent 09/18/2012     TDAP Vaccine (Boostrix) 10/14/2014     Td (Adult), Adsorbed 06/27/2002            Allergies:      Allergies   Allergen Reactions     Latex Itching and Rash     No Clinical Screening - See Comments      Coban and Surgilast cause itching     Tegaderm Transparent Dressing (Informational Only)              Medications:     Prescription Medications as of 11/27/2018             blood glucose monitoring (ONETOUCH ULTRA) test strip Use to test blood sugars 4 times daily as needed or as directed.    Calcium Carb-Cholecalciferol (CALCIUM 500 +D) 500-400 MG-UNIT TABS Take 500 mg by mouth daily    CANE, ANY MATERIAL One cane    carBAMazepine (TEGRETOL) 200 MG tablet Take 1 tablet (200 mg) by mouth 2 times daily    ciclopirox (LOPROX) 0.77 % cream Apply topically 2 times daily To feet and toenails.    cyanocobalamin 1000 MCG TABS Take 1 tablet by mouth daily    ferrous sulfate (IRON) 325 (65 FE) MG tablet Take 1 tablet (325 mg) by mouth 2 times daily    furosemide (LASIX) 20 MG tablet Take 1 tablet (20 mg) by mouth daily    gabapentin (NEURONTIN) 100 MG capsule Take 1 capsule (100 mg) by mouth At Bedtime    HYDROcodone-acetaminophen (NORCO) 5-325 MG per tablet Take 2 tablets by mouth every 6 hours as needed for moderate to severe pain    hydrOXYzine (ATARAX) 25 MG tablet Take 1 tablet (25 mg) by mouth 2 times daily    lactulose  "(CHRONULAC) 10 GM/15ML solution Take 60 mLs by mouth 2 times daily    levothyroxine (SYNTHROID/LEVOTHROID) 88 MCG tablet Take 1 tablet (88 mcg) by mouth daily    lidocaine (LMX4) 4 % CREA cream Apply topically once as needed for mild pain    methylphenidate (RITALIN) 10 MG tablet Take 1 tablet (10 mg) by mouth 2 times daily    miconazole (MICATIN; MICRO GUARD) 2 % powder Apply topically 2 times daily To groin as needed    mirtazapine (REMERON) 45 MG tablet Take 1 tablet (45 mg) by mouth At Bedtime    multivitamin, therapeutic with minerals (THERA-VIT-M) TABS Take 1 tablet by mouth 2 times daily    omeprazole (PRILOSEC) 20 MG CR capsule Take 2 capsules (40 mg) by mouth 2 times daily    ONE TOUCH LANCETS MISC by In Vitro route 4 times daily as needed    order for DME Equipment being ordered: manual wheelchair and cushion    [employer] light weight Sx5 18x18, 17.5 inch floor height and a 2 inch 18x18 inch cushion    length of need : lifetime        Patient's height : 5/10\", weight : 172 pounds    pravastatin (PRAVACHOL) 80 MG tablet Take 1 tablet (80 mg) by mouth daily    spironolactone (ALDACTONE) 25 MG tablet Take 2 tablets (50 mg) by mouth daily    thiamine (VITAMIN B-1) 100 MG tablet Take 1 tablet (100 mg) by mouth daily    traZODone (DESYREL) 50 MG tablet Take 1 tablet (50 mg) by mouth nightly as needed for sleep    Vitamin D, Cholecalciferol, 1000 units TABS Take 1 tablet by mouth daily    XIFAXAN 550 MG TABS tablet TAKE ONE TABLET BY MOUTH TWICE DAILY      Facility Administered Medications as of 11/27/2018             albumin human 25 % injection 75 g Inject 300 mLs (75 g) into the vein daily    calcium carbonate 500 mg-vitamin D 200 units (OSCAL with D;OYSTER SHELL CALCIUM) per tablet 1 tablet Take 1 tablet by mouth daily    carBAMazepine (TEGretol) tablet 200 mg Take 1 tablet (200 mg) by mouth 2 times daily    cyanocobalamin (vitamin  B-12) tablet 1,000 mcg Take 1 tablet (1,000 mcg) by mouth daily    Daily 2 " "GRAM acetaminophen limit, unless fulminent liver failure or transaminases greater than or equal to 300 - 400, then none continuous prn    ferrous sulfate (IRON) tablet 325 mg Take 1 tablet (325 mg) by mouth 2 times daily    gabapentin (NEURONTIN) capsule 100 mg Take 1 capsule (100 mg) by mouth At Bedtime    lactulose (CHRONULAC) solution 20 g Take 30 mLs (20 g) by mouth once    lactulose (CHRONULAC) solution 20 g 30 mLs (20 g) by Oral or NG Tube route every 2 hours as needed (Any time symptoms of Hepatic Encephalopathy (HE) are noted, RN to implement Lactulose Nurse Initiated Protocol. If symptoms of HE recur, RN will re-implement the protocol. RN to notify primary team each time HE Nurse Initiated Protocol is implemented)    Linked Group 1:  \"Or\" Linked Group Details     lactulose (CHRONULAC) solution 60 mL Take 60 mLs by mouth 2 times daily    lactulose (CHRONULAC) solution for enema prep 100 g Place 150 mLs (100 g) rectally every 2 hours as needed (Any time symptoms of HE(HE) are noted, RN to implement Lactulose Nurse Initiated Protocol. If symptoms of HE recur, RN will re-implement the protocol. RN to notify primary team each time HE Nurse Initiated Protocol is implemented.)    Linked Group 1:  \"Or\" Linked Group Details     levothyroxine (SYNTHROID/LEVOTHROID) tablet 88 mcg Take 1 tablet (88 mcg) by mouth every morning (before breakfast)    LORazepam (ATIVAN) injection 2 mg Inject 1 mL (2 mg) into the vein once    melatonin tablet 1 mg Take 1 tablet (1 mg) by mouth nightly as needed for sleep    miconazole (MICATIN; MICRO GUARD) 2 % powder Apply topically 2 times daily as needed for other (Apply to groin as needed)    mirtazapine (REMERON) tablet 45 mg Take 3 tablets (45 mg) by mouth At Bedtime    naloxone (NARCAN) injection 0.1-0.4 mg Inject 0.25-1 mLs (0.1-0.4 mg) into the vein every 2 minutes as needed for opioid reversal    omeprazole (priLOSEC) CR capsule 40 mg Take 2 capsules (40 mg) by mouth 2 times daily " "(before meals)    ondansetron (ZOFRAN) injection 4 mg Inject 2 mLs (4 mg) into the vein every 6 hours as needed for nausea or vomiting    Linked Group 2:  \"Or\" Linked Group Details     ondansetron (ZOFRAN-ODT) ODT tab 4 mg Take 1 tablet (4 mg) by mouth every 6 hours as needed for nausea or vomiting    Linked Group 2:  \"Or\" Linked Group Details     pravastatin (PRAVACHOL) tablet 80 mg Take 2 tablets (80 mg) by mouth daily    rifaximin (XIFAXAN) tablet 550 mg Take 1 tablet (550 mg) by mouth 2 times daily    senna-docusate (SENOKOT-S;PERICOLACE) 8.6-50 MG per tablet 1 tablet Take 1 tablet by mouth 2 times daily    Linked Group 3:  \"Or\" Linked Group Details     senna-docusate (SENOKOT-S;PERICOLACE) 8.6-50 MG per tablet 2 tablet Take 2 tablets by mouth 2 times daily    Linked Group 3:  \"Or\" Linked Group Details     vitamin B1 (THIAMINE) tablet 100 mg Take 1 tablet (100 mg) by mouth daily    vitamin D3 (CHOLECALCIFEROL) tablet 1,000 Units Take 1 tablet (1,000 Units) by mouth daily    ibuprofen (ADVIL/MOTRIN) tablet 600 mg (Discontinued) Take 3 tablets (600 mg) by mouth every 6 hours as needed for other (mild pain)    rifaximin (XIFAXAN) tablet 550 mg (Discontinued) Take 1 tablet (550 mg) by mouth 2 times daily    sodium chloride 0.9% infusion (Discontinued) Inject into the vein continuous    traZODone (DESYREL) tablet 50 mg (Discontinued) Take 1 tablet (50 mg) by mouth nightly as needed for sleep                Review of Systems:   Review of systems not obtained due to patient factors - mental status         Physical Exam:   /63 (BP Location: Left arm)  Pulse 74  Temp 99  F (37.2  C) (Oral)  Resp 16  Wt 76.5 kg (168 lb 9.6 oz)  SpO2 96%  BMI 24.19 kg/m2     General: NAD  HEENT: sclera anicteric  Resp: CTAB, no w/r/r  CVC: Regular rate  Skin: warm and dry  Extremities: No edema  Neurologic: Alert, looking around room. Follows simple commands but difficulty with complex commands. Unable to answer orientation " questions. Speech is minimal. Face symmetric, looking around room, pupils mildly asymmetric R>L both reactive to light. He was noted to be moving all extremities. Reflexes 2+ and symmetric. Not cooperating with ataxia testing.            Data:   CBC:  Lab Results   Component Value Date    WBC 2.8 11/27/2018     Lab Results   Component Value Date    HGB 11.3 11/27/2018     Lab Results   Component Value Date    HCT 34.0 11/27/2018     Lab Results   Component Value Date    PLT 35 11/27/2018       Last Basic Metabolic Panel:  Lab Results   Component Value Date     11/27/2018      Lab Results   Component Value Date    POTASSIUM 4.0 11/27/2018     Lab Results   Component Value Date    CHLORIDE 108 11/27/2018     Lab Results   Component Value Date    UCHE 8.4 11/27/2018     Lab Results   Component Value Date    CO2 20 11/27/2018     Lab Results   Component Value Date    BUN 30 11/27/2018     Lab Results   Component Value Date    CR 1.48 11/27/2018     Lab Results   Component Value Date     11/27/2018              Assessment and Recommendations:     # Encephalopathy:  Mono Varghese is a 48 year old male with history of seizure disorder related to left parietal resection for astrocytoma, cirrhosis with ascites and h/o hepatic encephalopathy who presents with confusion concerning for encephalopathy. No abnormal movements to suggest seizure at this time. Ammonia was noted to be 148 on 11/26, symptoms likely due to hepatic encephalopathy. Video EEG has been attached now and he is s/p ativan challenge. Will await report from epileptologist on this result. If it is concerning for seizure activity would recommend loading with keppra and starting maintenance. He should continue his home carbamazepine when he is able to.    Recommendations:  -VEEG hooked up, monitor overnight  -Continue current medications  -If seizure activity develops would favor loading with keppra and starting maintenance  -Continue workup and  treatment of medical causes of encephalopathy      Patient seen and discussed with attending physician Dr. Verdin.    Vahe Mcgrath  Neurology PGY-3

## 2018-11-27 NOTE — PROGRESS NOTES
"SPIRITUAL HEALTH SERVICES  SPIRITUAL ASSESSMENT Progress Note  [facility] [address]     REFERRAL SOURCE: Request from flory her to visit pt and family      Madonna Carvajal (Staff ) and I visited Mono and his father (his mother had gone to find Mono's wife)    We introduced Spiritual Health Services.    Mono communicated to us primarily through head nods.    His father offered some background on Mono's illness narrative, indicating that Mono has struggled with many illnesses, including \"a rosemary tumor that is mostly under control;\" the primary issue currently is that \"his liver is completley shot.\"    Mono's father explained to us that he and Mono's mother were encouraging Mono to \"let go if he was tired of fighting.\" Adding that, what happens from here is \"between Mono and the Lord.\"    Mono's father asked us if we could pray for Mono. Mono declined a prayer with a head nod but gave us an affirmative response when we asked him if we could pray with his father, which we did.    Mono agreed to a follow-up visit.    PLAN: Either Christianne Jurado or I will follow up with Mono 1/2x a week as unit chaplains.    Berhane Garcia  Chaplain Resident  Pager [phone]    "

## 2018-11-27 NOTE — PROCEDURES
Patient: Mono Varghese  MRN: [id number]  Admission Date: 11/26/16     Attending: Dr. Kenney Florez  Resident: Dr. Mary Beyer  Procedure: Diagnostic paracentesis  Indication: Abdominal distension, AMS  Pre-procedure diagnosis: Ascites  Post-procedure diagnosis: Paula Villareal was not able to consent due to mental status at the time of procedure. Due to the medical necessity of the procedure, the decision was made to proceed. Ultrasound was used to find a pocket of ascites in the left lower quadrant. A time out was performed. The area was prepped and draped in the usual sterile fashion. 10 ml of 1% lidocaine was instilled and ascites located. Needle was advanced into the fluid pocket, 10 mL of ascites (clear, yellow) was obtained then the needle removed. The specimen was sent for analysis. The area dressed and covered with a bandaid.      Patient tolerated the procedure well with no immediate complications. The supervising attending was present for the entirety of the procedure.      Mary Beyer MD MPH  Intern, Internal Medicine  Pager [phone]

## 2018-11-27 NOTE — PLAN OF CARE
Problem: Patient Care Overview  Goal: Plan of Care/Patient Progress Review  Admitted this 48 yr old male from ED at 2215 via litter and escorted by transport personnel. Came in with CC of Mental status changed and with Significant medical Hx of Parietal Astrocytoma S/P resection, Alc cirrhosi, hepatic Encephalopathy, rectal and esophageal varices, PVT and hypothyroidism. Made comfortable in bed. Had 1 BM, incontinent. Patient appeared disoriented, unable to answer questions appropriately but able to mumble words. He follows simple commands and helps with turning. Port-aCath for IV Access. Per wife patient had fallen at home multiple times, bed alarm placed. Made comfortable.  P: Continue to monitor patient for mentation and for safety.

## 2018-11-27 NOTE — H&P
[facility], [place]    Internal Medicine History and Physical - [facility]       Date of Admission:  11/26/2018    Chief Complaint   Encephalopathy  Generalized weakness    History is obtained from the patient's chart and ED providers. History is limited by the patient's altered mental status.     History of Present Illness   Mono Varghese is a 48 year old male with history of left parietal astrocytoma s/p resection, ESLD c/b refractory ascites requiring every 2 week genia (removing 8-12 L each time and receiving albumin), hepatic encephalopathy, rectal and esophageal varices and PVT (not currently on anticoagulation medication), and hypothyroidism, who presents with encephalopathy and generalized weakness.     By report, the patient's family reported to ED providers that Mono's symptoms began worsening on 11/24/2018. Pt did not take his lactulose on 11/22/2018 (Thanksgiving), but resumed taking it since that time. The lactulose dose had been increased at the beginning of November as ammonia level was increased. The family reports that the patient has otherwise been taking all his medications as prescribed. Patient did have blood cultures done on 11/23/2018 that have not shown any growth after 3 days. By report, the family said that the patient's bowel movements have been normal for him; he was complaining of abdominal pain on 11/24/2018, but has not been complaining of abdominal pain since that time. The patient had been previously scheduled to undergo paracentesis tomorrow. Patient otherwise did not report any subjective fevers, chills, headaches, dysuria or back pain. The patient has a dry cough, but the family reports that this is chronic for him. The family did reportedly note that pt has been more tremulous recently and has appeared more jaundiced over the past couple of days. Patient is now altered to the point of being unresponsive.    Of note, patient was recently  admitted here 11/1/2018-11/8/2018 after presenting to the ED with similar symptoms as today: worsening confusion, generalized weakness and found to have an elevated ammonia consistent with hepatic encephalopathy.  Patient's was started on empiric IV antibiotics and treated with the [place] protocol with improvement of encephalopathy.  Paracentesis performed during the previous hospitalization that returned positive for P. Acnes, which was thought to be a contaminant. Nevertheless, per ID, he was discharged home on a 2-week course of ceftriaxone which covered P. Acnes; he had completed this course of antibiotics.    ED Course: Received IV albumin and Lactulose 20 g. CT head, XR chest, Abd US.     Diagnostic paracentesis was performed after evaluating the patient. 10 ccs of clear yellow fluid were obtained and sent to lab.     Review of Systems   The 10 point Review of Systems is negative other than noted in the HPI or here.     Past Medical History    I have reviewed this patient's medical history and updated it with pertinent information if needed.   Past Medical History:   Diagnosis Date     ADHD      Alcoholic cirrhosis of liver with ascites (H) 06/17/2015    cirrhosis secondary to alcohol, complicated by variceal bleeding and hepatic encephalopathy      Ascites due to alcoholic cirrhosis (H)     Requiring regular paracentesis.     Chronic pain 8/1/2016     Depressive disorder 02/01/2001     Encephalopathy, hepatic (H) 7/29/2017     GERD (gastroesophageal reflux disease)      GIB (gastrointestinal bleeding) 11/23/2015    Admitted to the ICU 11/2015 for hemorrhagic shock 2/2 variceal bleed with subsequent sequelae of shock liver, multi organ dysfunction including altered mental status of unknown etiology, multiple thrombosis, decompensated liver cirrhosis, hematologic abnormalities, and JOSEF s/p banding at the end of 03/2016      H/O Oligoastrocytoma of parietal lobe (H) 06/11/2013    Presented acute onset of  right-sided seizures in 4/2013. 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 status post subtotal resection by Dr. J Carlos Aranda in early 06/2013. Negative for 1p/19q deletions. S/P Concurrent chemoradiation July 15 through August 23, 2013. Initiated adjuvant oral temozolomide 9/17/13; switch     H/O LENA (obstructive sleep apnea)-moderate 12/18/2012    Pt states this is not currently an issue.     Hyperlipidemia LDL goal <100 8/1/2016     Hypothyroidism 8/1/2016     Liver failure (H)      Moderate major depression (H) 10/3/2012     Obesity      Pancytopenia (H) 8/1/2016    Chronic pancytopenia secondary to liver disease since at least 2012      Partial epilepsy with impairment of consciousness (H) 05/07/2014     Portal hypertensive gastropathy (H)      Portal vein thrombosis 12/18/2015    history of left lower extremity deep vein thrombosis as well as portal vein and superior mesenteric vein thrombosis, late 2015 when he was hospitalized in the ICU and seriously ill temporary IVC filter placed in 12/2015 when he was in the ICU with deep vein thromboses and active bleeding      Pulmonary nodules 6/17/2015     Rectal bleeding      Seizures (H)      Type 2 diabetes mellitus (H) 10/3/2012     Past Surgical History   I have reviewed this patient's surgical history and updated it with pertinent information if needed.  Past Surgical History:   Procedure Laterality Date     ABDOMINAL PARACENTESIS  11/27/2018          COLONOSCOPY N/A 11/27/2015    Procedure: COMBINED COLONOSCOPY, SINGLE OR MULTIPLE BIOPSY/POLYPECTOMY BY BIOPSY;  Surgeon: Ran Thurston MD;  Location: [facility]     ENDOSCOPIC ULTRASOUND LOWER GASTROINTESTIONAL TRACT (GI) N/A 10/15/2018    Procedure: Rectal Endoscopic Ultrasound **Latex Allergy** with coiling and glueing of rectal varices;  Surgeon: Guru Jose Kelly MD;  Location: [facility]     ENDOSCOPIC  ULTRASOUND UPPER GASTROINTESTINAL TRACT (GI) N/A 10/1/2018    Procedure: ENDOSCOPIC ULTRASOUND, ESOPHAGOSCOPY / UPPER GASTROINTESTINAL TRACT (GI);;  Surgeon: Guru Jose Kelly MD;  Location: [facility]     ESOPHAGOSCOPY, GASTROSCOPY, DUODENOSCOPY (EGD), COMBINED N/A 11/23/2015    Procedure: COMBINED ESOPHAGOSCOPY, GASTROSCOPY, DUODENOSCOPY (EGD);  Surgeon: Rocael Rizvi MD;  Location: [facility]     ESOPHAGOSCOPY, GASTROSCOPY, DUODENOSCOPY (EGD), COMBINED N/A 1/25/2017    Procedure: COMBINED ESOPHAGOSCOPY, GASTROSCOPY, DUODENOSCOPY (EGD), BIOPSY SINGLE OR MULTIPLE;  Surgeon: Rocael Tejada MD;  Location: [facility]     ESOPHAGOSCOPY, GASTROSCOPY, DUODENOSCOPY (EGD), COMBINED N/A 3/30/2017    Procedure: COMBINED ESOPHAGOSCOPY, GASTROSCOPY, DUODENOSCOPY (EGD);  Surgeon: Jordana Ramirez MD;  [facility]: [facility]     ESOPHAGOSCOPY, GASTROSCOPY, DUODENOSCOPY (EGD), COMBINED N/A 1/19/2018    Procedure: COMBINED ESOPHAGOSCOPY, GASTROSCOPY, DUODENOSCOPY (EGD);  Upper Endoscopy with verceal banding; Flexible Sigmoidoscopy;  Surgeon: Guru Jose Kelly MD;  [facility]     ESOPHAGOSCOPY, GASTROSCOPY, DUODENOSCOPY (EGD), COMBINED N/A 2/12/2018    Procedure: COMBINED ESOPHAGOSCOPY, GASTROSCOPY, DUODENOSCOPY (EGD);  Esophagogastroduodenoscopy with variceal banding;  Surgeon: Guru Jose Kelly MD;  [facility]     ESOPHAGOSCOPY, GASTROSCOPY, DUODENOSCOPY (EGD), COMBINED N/A 3/5/2018    Procedure: COMBINED ESOPHAGOSCOPY, GASTROSCOPY, DUODENOSCOPY (EGD);  Esophagogastroduodenoscopy  with banding of varices Latex Allergy ;  Surgeon: Guru Jose Kelly MD;  [facility]     ESOPHAGOSCOPY, GASTROSCOPY, DUODENOSCOPY (EGD), COMBINED N/A 4/16/2018    Procedure: COMBINED ESOPHAGOSCOPY, GASTROSCOPY, DUODENOSCOPY (EGD);  Upper Endoscopy  with esophageal varices banding; Latex Allergy ;  Surgeon: Guru Jose Kelly MD;  Location:  OR      ESOPHAGOSCOPY, GASTROSCOPY, DUODENOSCOPY (EGD), COMBINED N/A 5/30/2018    Procedure: COMBINED ESOPHAGOSCOPY, GASTROSCOPY, DUODENOSCOPY (EGD);  upper endoscopy with banding of esophageal varices. *latex Allergy*;  Surgeon: Guru Jose Kelly MD;  Location: [facility]     OPTICAL TRACKING SYSTEM CRANIOTOMY, EXCISE TUMOR, COMBINED  6/6/2013    Procedure: COMBINED OPTICAL TRACKING SYSTEM CRANIOTOMY, EXCISE TUMOR;  Left Stealth Guided Craniotomy , Tumor Resection ;  Surgeon: J Carlos Aranda MD;  Location:  OR     ORTHOPEDIC SURGERY      hand surgery- right     SIGMOIDOSCOPY FLEXIBLE N/A 11/24/2015    Procedure: SIGMOIDOSCOPY FLEXIBLE;  Surgeon: Rocael Rizvi MD;  [facility]     SIGMOIDOSCOPY FLEXIBLE N/A 1/19/2018    Procedure: SIGMOIDOSCOPY FLEXIBLE;;  Surgeon: Guru Jose Kelly MD;  [facility]     SIGMOIDOSCOPY FLEXIBLE N/A 10/1/2018    Procedure: SIGMOIDOSCOPY FLEXIBLE;;  Surgeon: Guru Jose Kelly MD;  Location:  OR      Social History   Social History   Substance Use Topics     Smoking status: Never Smoker     Smokeless tobacco: Never Used     Alcohol use No      Comment: Quit 01/2014     Family History   I have reviewed this patient's family history and updated it with pertinent information if needed.   Family History   Problem Relation Age of Onset     Adopted: Yes     Medical History Unknown Mother      Cirrhosis Father      Medical History Unknown Brother      Medical History Unknown Brother      Medical History Unknown Brother      Prior to Admission Medications   Prior to Admission Medications   Prescriptions Last Dose Informant Patient Reported? Taking?   CANE, ANY MATERIAL   No No   Sig: One cane   Calcium Carb-Cholecalciferol (CALCIUM 500 +D) 500-400 MG-UNIT TABS   No No   Sig: Take 500 mg by mouth daily   HYDROcodone-acetaminophen (NORCO) 5-325 MG per tablet   No No   Sig: Take 2 tablets by mouth every 6 hours as needed for  moderate to severe pain   ONE TOUCH LANCETS MISC   No No   Sig: by In Vitro route 4 times daily as needed   Vitamin D, Cholecalciferol, 1000 units TABS   Yes No   Sig: Take 1 tablet by mouth daily   XIFAXAN 550 MG TABS tablet   No No   Sig: TAKE ONE TABLET BY MOUTH TWICE DAILY   blood glucose monitoring (ONETOUCH ULTRA) test strip   No No   Sig: Use to test blood sugars 4 times daily as needed or as directed.   carBAMazepine (TEGRETOL) 200 MG tablet   No No   Sig: Take 1 tablet (200 mg) by mouth 2 times daily   ciclopirox (LOPROX) 0.77 % cream   No No   Sig: Apply topically 2 times daily To feet and toenails.   Patient taking differently: Apply topically 2 times daily as needed To feet and toenails.   cyanocobalamin 1000 MCG TABS   Yes No   Sig: Take 1 tablet by mouth daily   ferrous sulfate (IRON) 325 (65 FE) MG tablet   No No   Sig: Take 1 tablet (325 mg) by mouth 2 times daily   furosemide (LASIX) 20 MG tablet   No No   Sig: Take 1 tablet (20 mg) by mouth daily   gabapentin (NEURONTIN) 100 MG capsule   No No   Sig: Take 1 capsule (100 mg) by mouth At Bedtime   hydrOXYzine (ATARAX) 25 MG tablet   No No   Sig: Take 1 tablet (25 mg) by mouth 2 times daily   Patient taking differently: Take 25 mg by mouth 2 times daily as needed for itching    lactulose (CHRONULAC) 10 GM/15ML solution   No No   Sig: Take 60 mLs by mouth 2 times daily   levothyroxine (SYNTHROID/LEVOTHROID) 88 MCG tablet   No No   Sig: Take 1 tablet (88 mcg) by mouth daily   lidocaine (LMX4) 4 % CREA cream   No No   Sig: Apply topically once as needed for mild pain   methylphenidate (RITALIN) 10 MG tablet   No No   Sig: Take 1 tablet (10 mg) by mouth 2 times daily   miconazole (MICATIN; MICRO GUARD) 2 % powder   No No   Sig: Apply topically 2 times daily To groin as needed   mirtazapine (REMERON) 45 MG tablet   No No   Sig: Take 1 tablet (45 mg) by mouth At Bedtime   multivitamin, therapeutic with minerals (THERA-VIT-M) TABS  Self Yes No   Sig: Take 1  "tablet by mouth 2 times daily   omeprazole (PRILOSEC) 20 MG CR capsule   No No   Sig: Take 2 capsules (40 mg) by mouth 2 times daily   order for DME   No No   Sig: Equipment being ordered: manual wheelchair and cushion    [employer] light weight Sx5 18x18, 17.5 inch floor height and a 2 inch 18x18 inch cushion    length of need : lifetime        Patient's height : 5/10\", weight : 172 pounds   pravastatin (PRAVACHOL) 80 MG tablet   No No   Sig: Take 1 tablet (80 mg) by mouth daily   spironolactone (ALDACTONE) 25 MG tablet   No No   Sig: Take 2 tablets (50 mg) by mouth daily   thiamine (VITAMIN B-1) 100 MG tablet   No No   Sig: Take 1 tablet (100 mg) by mouth daily   traZODone (DESYREL) 50 MG tablet   No No   Sig: Take 1 tablet (50 mg) by mouth nightly as needed for sleep      Facility-Administered Medications: None     Allergies   Allergies   Allergen Reactions     Latex Itching and Rash     No Clinical Screening - See Comments      Coban and Surgilast cause itching     Tegaderm Transparent Dressing (Informational Only)      Physical Exam   Vital Signs: Temp: 96.1  F (35.6  C) Temp src: Oral BP: 125/80   Heart Rate: 73 Resp: 18 SpO2: 97 % O2 Device: None (Room air)    Weight: 168 lbs 9.6 oz    General Appearance: Awake but mental status is altered and patient does not speak. Cachectic-appearing. Withdraws from stimuli initially but cooperative with encouragement.   HEENT: NCAT, scleral icterus, temporal wasting  Respiratory: Clear to ausculation bilaterally  Cardiovascular: RRR, no m/r/g  GI: Abdomen distended, non-tender to light and deep palpation, NABS, fluid wave  Extremeties: +3 pitting edema in RLE, cold to touch, +2 pedal pulses  Skin: Jaundiced, spider angiomata  Neurologic: Awake and appears to be alert, but not oriented to person, place or time. Moves extremities equally. Asterixis present.   Psychiatric: Unable to assess    Ultrasound in ED with no evidence of portal vein thrombus.   CT head stable with no " acute intracranial pathology.   XR Chest unremarkable.     Assessment & Plan   Mono Varghese is a 48 year old male with history of left parietal astrocytoma s/p resection, ESLD c/b refractory ascites requiring every 2 week genia (removing 8-12 L each time and receiving albumin), hepatic encephalopathy, rectal and esophageal varices and PVT (not currently on anticoagulation medication), and hypothyroidism, who presents with encephalopathy and generalized weakness.     # Encephalopathy  # Alcoholic cirrhosis c/b refractory ascites, MELD 19  Recently discharged on 11/8/18. Presenting with confusion and generalized weakness. Awake but not responsive. Ammonia 148. Exam notable for abdominal distention. Patient had been previously scheduled to undergo paracentesis today. Lactulose given in the ED. Asterixis on exam so likely HE.  - F/u results of diagnostic para this PM to rule out SBP  - [place] protocol, goal ~6 BMs every day  - Lactulose scheduled TID  - Continue PTA Rifaximin  - Consider therapeuic paracentesis tomorrow in the AM  - Place speech and swallow consult in AM  - F/u blood cultures  - Daily CMP and CBC, continue to monitor  - Fluids: 3-day albumin challenge (1g/kg/day)    # Generalized weakness  # History of seizures  # Parietal astrocytoma s/p partial resection, chemo, XRT in 2012 c/b seizures    Patient presents with AMS and generalized weakness. CT head today with no acute intracranial pathology. Stable postsurgical changes are seen, no changes compared to prior CT 9/14/18. Previously noted that due to neurological structural abnormalities, pt is more so sensitive to small metabolic changes, which may precipitate encephalopathy. DDx for Mr. Varghese includes metabolic, including hepatic encephalopathy, seizure, infection, and medication. Ammonia elevated above baseline and asterixis on exam. Patient has been taking medications including carbamazepine as prescriped. Most likely AMS is due to hepatic  encephalopathy. During prior hospitalization, CT angio head/neck and MRI brain w/ and w/o contrast negative. Weakness is likely a chronic problem; myopathy was previously considered but was thought to be less likely.    - if no improvement in mental status after ammonia cleared, consider EEG for seizures, MRI for stroke evaluation   - Continue PTA carbamazepine    #JOSEF  Creatinine 1.62 on admission (baseline ~0.8). UA with not concerning for infection.   - Hold PTA lasix and spironolactone and re-assess in AM  - Continue to trend Cr    # Malnutrition  - Continue PTA folic acid  - Continue PTA methylphenidate  - Continue PTA thiamine  - Continue PTA D3  - Speech and Swallow consult in AM (requested)       - If cleared, place on high protein/KCal diet    # Cough  Chronic non-productive cough, afebrile, CXR unremarkable. Likely due to abdominal distension due to worsening ascites. Might have improvement after therapeutic paracentesis.   - continue to monitor  - consider therapeutic para in AM    # Pancytopenia  Hg 12.3, WBC 3.9, Plt 41. Have remained stable. No source of bleeding  - Continue to trend      Diet: NPO for Medical/Clinical Reasons Except for: Meds, Ice Chips  Fluids: 3-day albumin challenge 1g/kg/day  Lines: PIV  Munoz Catheter: not present    DVT Prophylaxis: Pneumatic Compression Devices  Code Status: DNR/DNI Prior- Patient encephalopathic, deferred  Expected discharge: pending clinical course     The patient was discussed with Dr. Kenney Florez.    Mary Beyer MD MPH  [facility]   Pager: [phone]  Please see sticky note for cross cover information  Data     Recent Labs  Lab 11/26/18  1832 11/26/18  1756   WBC  --  3.9*   HGB  --  12.3*   MCV  --  97   PLT  --  41*   INR  --  1.54*   NA  --  133   POTASSIUM  --  4.1   CHLORIDE  --  105   CO2  --  20   BUN  --  30   CR  --  1.62*   ANIONGAP  --  8   UCHE  --  8.3*   GLC  --  171*   ALBUMIN  --   3.3*   PROTTOTAL  --  7.1   BILITOTAL  --  1.3   ALKPHOS  --  279*   ALT  --  30   AST  --  38   TROPONIN 0.01  --

## 2018-11-27 NOTE — PLAN OF CARE
Problem: Patient Care Overview  Goal: Plan of Care/Patient Progress Review  Outcome: Improving  Vital signs stable on RA. Tq2h. Orientation improving this shift. Pt did not answer questions or follow any commands at start of shift. Late afternoon, pt answering most questions and following most commands. Oriented to self. BM x5. PRN lactulose given. EEG started. Will continue to monitor.

## 2018-11-27 NOTE — PLAN OF CARE
Problem: Patient Care Overview  Goal: Plan of Care/Patient Progress Review  SLP evaluation attempted x2, per RN the patient is not appropriate to participate in a swallowing evaluation. Will reschedule for 11/28/18.

## 2018-11-27 NOTE — MR AVS SNAPSHOT
After Visit Summary   11/27/2018    Mono Varghese    MRN: [id number]           Patient Information     Date Of Birth          1970        Visit Information        Provider Department      11/27/2018 1:30 PM [facility] EEG TECH 4 [facility] EEG        Today's Diagnoses     Partial epilepsy with impairment of consciousness (H)    -  1       Follow-ups after your visit        Your next 10 appointments already scheduled     Nov 28, 2018  7:00 AM CST   24 Hour Video Visit with [facility] EEG TECH 4   [facility] EEG ([facility])    [facility]  [address] [facility] [address]   [phone]           [place]: Your appointment is scheduled at [facility]. [address] [place] [address]            Nov 29, 2018  1:50 PM CST   (Arrive by 1:35 PM)   Return Visit with King Louis MD   [facility] ([facility] [place])    [address] [place] [address]  4th [facility] [id number] [phone]            Dec 03, 2018   Procedure with Guru Jose Kelly MD   [facility] [place], Same Day Surgery (--)    [address] [facility] [address] [phone]            Jan 02, 2019  1:00 PM CST   Adult Med Follow UP with Leroy Alcaraz MD   [facility] ([facility])    [address]  [address] [place] [id number] [phone]            Feb 13, 2019  9:00 AM CST   Lab with  LAB   [facility] Lab ([facility] [place])    [address] [place] [address]  1st [facility] [id number] [phone]            Feb 13, 2019 10:00 AM CST   (Arrive by 9:45 AM)   Return General Liver with Beatriz Tanner MD   [facility] Hepatology ([facility] [place])    [address] [place] [address]  [address]  [facility] [id number] [phone]            Apr 26, 2019 10:00 AM CDT   MR BRAIN W/O & W CONTRAST with [address]  Imaging Center MRI ([facility] [place])    [address] [facility]  [address]st [facility] [address] [phone]           How do I prepare for my exam? (Food and drink instructions) **If you will be receiving sedation or general anesthesia, please see special notes below.**  How do I prepare for my exam? (Other instructions) Take your medicines as usual, unless your doctor tells you not to. You may or may not receive intravenous (IV) contrast for this exam pending the discretion of the Radiologist.  You do not need to do anything special to prepare.  **If you will be receiving sedation or general anesthesia, please see special notes below.**  What should I wear: The MRI machine uses a strong magnet. Please wear clothes without metal (snaps, zippers). A sweatsuit works well, or we may give you a hospital gown. Please remove any body piercings and hair extensions before you arrive. You will also remove watches, jewelry, hairpins, wallets, dentures, partial dental plates and hearing aids. You may wear contact lenses, and you may be able to wear your rings. We have a safe place to keep your personal items, but it is safer to leave them at home.  How long does the exam take: Most tests take 30 to 60 minutes.  HOWEVER, IF YOUR DOCTOR PRESCRIBES ANESTHESIA please plan on spending four to five hours in the recovery room.  What should I bring:  Bring a list of your current medicines to your exam (including vitamins, minerals and over-the-counter drugs).  Do I need a :  **If you will be receiving sedation or general anesthesia, please see special notes below.**  What should I do after the exam: No Restrictions, You may resume normal activities.  What is this test: MRI (magnetic resonance imaging) uses a strong magnet and radio waves to look inside the body. An MRA (magnetic resonance angiogram) does the same thing, but it lets us look at your blood vessels. A computer turns the radio waves into  pictures showing cross sections of the body, much like slices of bread. This helps us see any problems more clearly. You may receive fluid (called  contrast ) before or during your scan. The fluid helps us see the pictures better. We give the fluid through an IV (small needle in your arm).  Who should I call with questions:  Please call the Imaging Department at your exam site with any questions. Directions, parking instructions, and other information is available on our website, [email or web address].  How do I prepare if I m having sedation or anesthesia? **IMPORTANT** THE INSTRUCTIONS BELOW ARE ONLY FOR THOSE PATIENTS WHO HAVE BEEN TOLD THEY WILL RECEIVE SEDATION OR GENERAL ANESTHESIA DURING THEIR MRI PROCEDURE:  IF YOU WILL RECEIVE SEDATION (take medicine to help you relax during your exam): You must get the medicine from your doctor before you arrive. Bring the medicine to the exam. Do not take it at home. Arrive one hour early. Bring someone who can take you home after the test. Your medicine will make you sleepy. After the exam, you may not drive, take a bus or take a taxi by yourself. No eating 8 hours before your exam. You may have clear liquids up until 4 hours before your exam. (Clear liquids include water, clear tea, black coffee and fruit juice without pulp.)  IF YOU WILL RECEIVE ANESTHESIA (be asleep for your exam): Arrive 1 1/2 hours early. Bring someone who can take you home after the test. You may not drive, take a bus or take a taxi by yourself. No eating 8 hours before your exam. You may have clear liquids up until 4 hours before your exam. (Clear liquids include water, clear tea, black coffee and fruit juice without pulp.)            May 03, 2019 10:45 AM CDT   (Arrive by 10:30 AM)   Return Visit with Lauro Shaw MD   [facility] ([facility])    [address] [facility]  [address]  [facility] [address] [phone]              Future tests that were  ordered for you today     Open Standing Orders        Priority Remaining Interval Expires Ordered    Magnesium Routine 1/1 AM DRAW  11/27/2018    Comprehensive metabolic panel Routine 1/1 AM DRAW  11/27/2018    INR Routine 1/1 AM DRAW  11/27/2018    CBC with platelets Routine 1/1 AM DRAW  11/27/2018            Who to contact     Please call your clinic at [phone] to:    Ask questions about your health    Make or cancel appointments    Discuss your medicines    Learn about your test results    Speak to your doctor            Additional Information About Your Visit        [email or web address] Information     [email or web address] gives you secure access to your electronic health record. If you see a primary care provider, you can also send messages to your care team and make appointments. If you have questions, please call your primary care clinic.  If you do not have a primary care provider, please call [phone] and they will assist you.      [email or web address] is an electronic gateway that provides easy, online access to your medical records. With [email or web address], you can request a clinic appointment, read your test results, renew a prescription or communicate with your care team.     To access your existing account, please contact your [facility] or call [phone] for assistance.        Care EveryWhere ID     This is your Care EveryWhere ID. This could be used by other organizations to access your [facility] medical records  [id number]         Blood Pressure from Last 3 Encounters:   11/27/18 108/63   11/12/18 127/88   11/08/18 122/83    Weight from Last 3 Encounters:   11/26/18 76.5 kg (168 lb 9.6 oz)   11/07/18 82.8 kg (182 lb 8 oz)   10/24/18 77.5 kg (170 lb 12.8 oz)              Today, you had the following     No orders found for display         Today's Medication Changes      Notice     This visit is during an admission. Changes to the med list made in this visit will be reflected in the After Visit Summary of  the admission.             Primary Care Provider Office Phone # Fax #    King Louis [phone]       [address] [place] [address]        Equal Access to Services     SAMUEL FAIR : Hadii aad ku hadhectoro Sojoali, waaxda luqadaha, qaybta kaalmada adeegyada, josue dinain hayaabrian lawrencesiobhan martinezdenise tamayo. So [facility] [phone].    ATENCIÓN: Si habla español, tiene a chiang disposición servicios gratuitos de asistencia lingüística. Llame al [phone].    We comply with applicable federal civil rights laws and [place] laws. We do not discriminate on the basis of race, color, national origin, age, disability, sex, sexual orientation, or gender identity.            Thank you!     Thank you for choosing [facility]  for your care. Our goal is always to provide you with excellent care. Hearing back from our patients is one way we can continue to improve our services. Please take a few minutes to complete the written survey that you may receive in the mail after your visit with us. Thank you!             Your Updated Medication List - Protect others around you: Learn how to safely use, store and throw away your medicines at www.disposemymeds.org.      Notice     This visit is during an admission. Changes to the med list made in this visit will be reflected in the After Visit Summary of the admission.

## 2018-11-28 NOTE — PLAN OF CARE
Problem: Patient Care Overview  Goal: Plan of Care/Patient Progress Review  Outcome: No Change  Pt alert and oriented to self only at start of shift. Subsequent assessments found pt disoriented x4. Slow to respond. No complaints of pain. At start of shift pt was on VPM. Staff found that pt had de-accessed his port. Port has been accessed by float nurse and now in use. Wife at bedside most of shift. Helpful with answering questions. Incontinent of bowel and bladder. Pt received scheduled lactulose and PRN per [facility] protocol. 1 stool episode so far. Goal is 6 stools. Diet advanced from NPO to DD2 with thin liquids. See speech therapists note for feeding criteria. VPM discontinued, bedside attendant in room. Pt is allergic to tegaderm. For IV, the dressing used is IV 3000. Seizure precaution in place. Continue with plan of care.    Pt took all meds PO during this shift.

## 2018-11-28 NOTE — PROCEDURES
Procedure Date: 11/27/2018   EEG#: -1   DATE OF RECORDING/SERVICE DATE:  11/27/2018    SOURCE FILE DURATION:  09:38:19      HISTORY:  This is day #1 of video EEG monitoring in a 48-year-old patient who presents with history of left parietal astrocytoma resection as well as history of hepatic encephalopathy, presenting initially with worsened encephalopathy and weakness on 11/24/2018.  Video EEG was started for seizure, initial was routine non-video and then switched to video EEG upon finding nonconvulsive status epilepticus.      MEDICATIONS:   1.  Tegretol 200 mg b.i.d.   2.  Neurontin 100 mg at bedtime.   3.  Remeron 45 mg at bedtime.   4.  Ativan 2 mg given at 14:42.     TECHNICAL SUMMARY: This continuous video- EEG monitoring procedure was performed with 23 scalp electrodes in 10-20 electrode system placement, and additional scalp, precordial and other surface electrodes used for electrical referencing and artifact detection.  Video monitoring was utilized and periodically reviewed by EEG technologists and the physician for electroclinical correlations.     FINDINGS:   BACKGROUND:  The patient has no well-formed parieto-occipital rhythm seen throughout the recording.  There was generalized slowing of delta and theta frequencies throughout the recording.  There are faster frequencies seen in the right hemisphere compared with the left and there was an asymmetry with increased amplitude seen in the left hemisphere throughout the recording.  Toward the end of the recording, the patient does have generalized sharply contoured 2-3 Hz delta activity seen with higher amplitudes in the left hemisphere compared with the right.      EPILEPTIFORM ACTIVITY:  Occasional to frequent sharp waves were seen over the left centroparietal region at C3/P3.      ICTAL ACTIVITY:  At the start of recording, there was moderate to high amplitude rhythmic 2-3 Hz delta activities over the left hemisphere.  Periodic sharp waves were  seen over C3/P3 at 1 Hz.  Lorazepam challenge was done. Clinically, the patient is able to follow 1-step commands at times but cannot follow multiple step commands and at times is unable to follow any commands and appears to be looking around confused.  There are no stereotyped movements seen during this time.  At 14:59:45, 2 mg of Ativan was delivered and clinically the patient was still unable to follow commands at 15:01:50.  Electrographically, previously mentioned continuous lateralizing discharges become more disorganized with the pattern becoming more intermittent and abating around 15:13:00 and eventually being replaced by diffuse theta and delta activities.      IMPRESSION:  This is an abnormal day #1 video EEG due to the presence of periodic lateralized at the beginning of the recording, which resolved with lorazepam, however, there was no clinical improvement in the patient's cognition.    There was indication of moderate to severe encephalopathy that is generalized as well as larger amplitudes and slowing in the left hemisphere, indicating a structural cerebral dysfunction. There were epileptiform discharges over the left centroparietal region, consistent with focal epilepsy.         This report is dictated by Dr. Freddy Santos DO, CNP fellow.      This report is to be reviewed by epilepsy staff/supervisor: Dr. Marcelina Milan MD       I personally reviewed the EEG and amended the report as appropriate.  Marcelina Milan MD                      D: 2018   T: 2018   MT: DARRON      Name:     SALO MADERA   MRN:      [id number]        Account:        [id number]   :      1970           Procedure Date: 2018      Document: [id number]

## 2018-11-28 NOTE — PLAN OF CARE
Problem: Patient Care Overview  Goal: Plan of Care/Patient Progress Review  Outcome: Improving  Pt VSS on RA. Pt lethargic. Arouses to voice or light tactile stimulation. Did not follow commands earlier in shift but improving throughout shift, able to squeeze hand when prompted and shaking head at times to questions. 2 doses of lactulose given. 1 BM and 1 urinary incontinence. Total of 6 BMs today. Per providers- continue prn lactulose. Pt continues on video EEG for monitoring and VPM for safety. Wife visiting twice during shift.     Plan: Continue to monitor mentation closely and give lactulose for hepatic encephalopathy.

## 2018-11-28 NOTE — PROGRESS NOTES
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  pt well-known to  caseload during frequent admissions. Previous ST recommendations for regular diet and thin liquids on  11/2/2018. Per RN, pt with improved FAVIOLA this date but with ongoing difficulties following commands. Clinical swallow eval completed per MD orders to further assess oropharyngeal swallow function.    Clinical Swallow Evaluation   Oral Musculature unable to assess due to poor participation/comprehension   Additional Documentation Yes   Clinical Swallow Eval: Thin Liquid Texture Trial   Mode of Presentation, Thin Liquids spoon;straw;fed by clinician   Volume of Liquid or Food Presented 5 oz   Oral Phase of Swallow Poor AP movement   Pharyngeal Phase of Swallow intact   Diagnostic Statement Pt tolerated thin liquids via spoon and straw with no overt s/sx of aspiration   Clinical Swallow Eval: Puree Solid Texture Trial   Mode of Presentation, Puree spoon;fed by clinician   Volume of Puree Presented 3 tbsp   Oral Phase, Puree Poor AP movement   Pharyngeal Phase, Puree intact   Diagnostic  Statement Pt tolerated pureed textures via spoon with no overt s/sx of aspiration   Clinical Swallow Eval: Solid Food Texture Trial   Mode of Presentation, Solid fed by clinician   Volume of Solid Food Presented 1 tbsp   Oral Phase, Solid Poor AP movement;other (see comments)  (prolonged time for mastication)   Oral Residue, Solid mid posterior tongue   Pharyngeal Phase, Solid intact   Diagnostic Statement No overt s/sx of aspiration, however pt noted to require prolonged time for mastication. Pt with moderate oral residuals which pt required liquid wash to clear   VFSS Evaluation   VFSS Additional Documentation No   FEES Evaluation   Additional Documentation No   Swallow Compensations   Swallow Compensations Alternate viscosity of consistencies;Effortful swallow;Pacing;Reduce amounts;Multiple swallow   Results Oral difficulties only   General Therapy Interventions   Planned Therapy Interventions Dysphagia Treatment   Dysphagia treatment Compensatory strategies for swallowing;Instruction of safe swallow strategies;Modified diet education   Swallow Eval: Clinical Impressions   Skilled Criteria for Therapy Intervention Skilled criteria met.  Treatment indicated.   Functional Assessment Scale (FAS) 4   Treatment Diagnosis mild-moderate oropharyngeal dysphagia   Diet texture recommendations Dysphagia diet level 2;Thin liquids  (1:1 supervision)   Recommended Feeding/Eating Techniques alternate between small bites and sips of food/liquid;check mouth frequently for oral residue/pocketing;hard swallow w/ each bite or sip;maintain upright posture during/after eating for 30 mins;small sips/bites   Demonstrates Need for Referral to Another Service occupational therapy;physical therapy;dietitian   Therapy Frequency 5 times/wk   Predicted Duration of Therapy Intervention (days/wks) 1 week   Anticipated Discharge Disposition extended care facility   Risks and Benefits of Treatment have been explained. Yes   Patient, family  and/or staff in agreement with Plan of Care Yes   Clinical Impression Comments SLP: Clinical swallow eval completed per MD orders. Pt presents with mild-moderate oropharyngeal dysphagia in the setting of AMS. Pt tolerated thin liquids, pureed textures, and regular solid textures with no overt s/sx of aspiration. However pt noted to require prolonged time for AP transit across all trials and with intermittent bolus holding. Pt with moderate oral residuals following regular solid textures and required liquid wash to clear. Recommend pt initiate dysphagia diet 2 and thin liquids with 1:1 supervision. Pt should be fully upright and alert for all PO, take small sips/bites, slow pacing, and alternate between consistencies. ST to continue to follow for diet tolerance and advanced trials as appropriate. Anticipate pt will require ongoing ST upon discharge.    Total Evaluation Time   Total Evaluation Time (Minutes) 15

## 2018-11-28 NOTE — PROGRESS NOTES
[facility], [place]    Internal Medicine Progress Note - Piedad 3 Service    Main Plans for Today   - Repeat vEEG, repeat ativan challenge  - continue lactulose   - seizure precautions  - diet advancement with supervision    - vitamin K    Assessment & Plan   Mono Varghese is a 48 year old male with history of left parietal astrocytoma s/p resection, ESLD c/b refractory ascites requiring every 2 week genia (removing 8-12 L each time and receiving albumin), hepatic encephalopathy, rectal and esophageal varices and PVT (not currently on anticoagulation medication), and hypothyroidism, who presents with encephalopathy and generalized weakness.      # Encephalopathy  # Alcoholic cirrhosis c/b refractory ascites, MELD 19  Recently discharged on 11/8/18. Presenting with confusion and generalized weakness. Awake but not responsive. Ammonia 148. Exam notable for abdominal distention. Patient had been previously scheduled to undergo paracentesis today. Lactulose given in the ED. Asterixis on exam so likely HE. Asterixis improved. Diagnostic para without evidence of SBP. Ammonia decreased significantly in first days of admission.   - Revere protocol, goal ~6 BMs every day  - Lactulose scheduled TID  - Continue PTA Rifaximin  - Consider therapeuic paracentesis once patient clears and JOSEF resolves  - F/u blood cultures, NGTD  - Daily CMP and CBC, continue to monitor   - Fluids: 3-day albumin challenge (1g/kg/day)  - Vitamin K     # Generalized weakness  # History of seizures  # Parietal astrocytoma s/p partial resection, chemo, XRT in 2012 c/b seizures    Patient presents with AMS and generalized weakness. CT head today with no acute intracranial pathology. Stable postsurgical changes are seen, no changes compared to prior CT 9/14/18. Previously noted that due to neurological structural abnormalities, pt is more so sensitive to small metabolic changes, which may precipitate encephalopathy.  DDx for Mr. Varghese includes metabolic, including hepatic encephalopathy, seizure, infection, and medication. Ammonia elevated above baseline and asterixis on exam. Patient has been taking medications including carbamazepine as prescriped. Most likely AMS is due to hepatic encephalopathy. During prior hospitalization, CT angio head/neck and MRI brain w/ and w/o contrast negative. Weakness is likely a chronic problem; myopathy was previously considered but was thought to be less likely. vEEG with some concerning discharges, though clear seizure activity as of yet unconfirmed. S/p 2x ativan challenges.   - if no improvement in mental status after ammonia cleared, and seizure activity ruled out, MRI for further evaluation   - Continue PTA carbamazepine  - Neurology consulted, appreciate recs   - Continue PTA carbamazepine, if patient cannot tolerate, cross cover with 750 mg keppra IV   - Follow up results of ativan challenge #2   - follow up vEEG reads     #JOSEF  Creatinine 1.62 on admission (baseline ~0.8). UA with not concerning for infection. Improved to 1.48 with fluids. Undergoing albumin challenge currently.   - Hold PTA lasix and spironolactone for now  - Albumin/LR bolus PRN  - Continue to trend Cr     # Malnutrition  - Continue PTA folic acid, methylphenidate, thiamine, D3  - Speech and Swallow consult           - If eventually cleared, place on high protein/KCal diet, AMS/HE limiting at this time     # Cough  Chronic non-productive cough, afebrile, CXR unremarkable on admission. Likely due to abdominal distension due to worsening ascites. Might have improvement after therapeutic paracentesis.   - continue to monitor   - consider therapeutic para when JOSEF resolved and patient more stable     # Pancytopenia  Hg 12.3, WBC 3.9, Plt 41. Have remained stable. No source of active bleeding.  - Continue to trend     Diet: Dysphagia Diet Level 2 [facility] Altered Thin Liquids (water, ice chips, juice, milk gelatin, ice  cream, etc) (1:1 supervision, see speech note for recs)  Fluids: NPO  Lines: PIV, port   Munoz Catheter: not present    DVT Prophylaxis: Pneumatic Compression Devices  Code Status: DNR/DNI  Expected discharge: 4 - 7 days, recommended to prior living arrangement once mental status at baseline.    The patient's care was discussed with the Attending Physician, Dr. Graves.    Arpan Haley [facility]  [facility] 3  Pager: [phone]  Please see sticky note for cross cover information    Interval History    Mentation again appears improved today. Disoriented x 3, though able to follow simple commands and to answer in the negative/affirmative with single word answers or head nods/shakes.     Again, ROS complicated by encephalopathy, patient able to provide that he is feeling alright.      Wife updated at bedside.     Physical Exam   Vital Signs: Temp: 97.4  F (36.3  C) Temp src: Axillary BP: 114/67 Pulse: 67 Heart Rate: 71 Resp: 16 SpO2: 97 % O2 Device: None (Room air)    Weight: 158 lbs 9.6 oz  General Appearance: Awake, disoriented, cachetic   Respiratory: CTAB, comfortable on room air  Cardiovascular: RRR, no m/r/c/g  GI: distended abdomen with fluid wave, nontender to palpation, no organomegaly apprecaited  Skin: globally jaundiced  Other: Global weakness      Data     Recent Labs  Lab 11/28/18  0602 11/27/18  2059 11/27/18  0604 11/26/18  1832 11/26/18  1756   WBC 1.5*  --  2.8*  --  3.9*   HGB 9.8*  --  11.3*  --  12.3*     --  98  --  97   PLT 32*  --  35*  --  41*   INR 1.82*  --   --   --  1.54*    139 136  --  133   POTASSIUM 3.6 3.6 4.0  --  4.1   CHLORIDE 112* 110* 108  --  105   CO2 22 21 20  --  20   BUN 30 30 30  --  30   CR 1.39* 1.38* 1.48*  --  1.62*   ANIONGAP 8 8 8  --  8   UCHE 8.9 8.8 8.4*  --  8.3*   GLC 99 91 111*  --  171*   ALBUMIN 3.7  --  3.0*  --  3.3*   PROTTOTAL 6.3*  --  6.2*  --  7.1   BILITOTAL 2.4*  --  1.5*  --  1.3   ALKPHOS 207*  --  241*  --  279*   ALT  27  --  29  --  30   AST 35  --  33  --  38   TROPONIN  --   --   --  0.01  --        No results found for this or any previous visit (from the past 24 hour(s)).  Medications     - MEDICATION INSTRUCTIONS -         albumin human  75 g Intravenous Daily     calcium carbonate-vitamin D  1 tablet Oral Daily     carBAMazepine  200 mg Oral BID     vitamin B-12  1,000 mcg Oral Daily     ferrous sulfate  325 mg Oral BID     gabapentin  100 mg Oral At Bedtime     lactulose  60 mL Oral BID     levothyroxine  88 mcg Oral QAM AC     mirtazapine  45 mg Oral At Bedtime     omeprazole  40 mg Oral BID AC     pravastatin  80 mg Oral Daily     rifaximin  550 mg Oral BID     senna-docusate  1 tablet Oral BID    Or     senna-docusate  2 tablet Oral BID     vitamin B1  100 mg Oral Daily     vitamin D3  1,000 Units Oral Daily

## 2018-11-28 NOTE — MR AVS SNAPSHOT
After Visit Summary   11/28/2018    Mono Varghese    MRN: [id number]           Patient Information     Date Of Birth          1970        Visit Information        Provider Department      11/28/2018 7:00 AM [facility] EEG TECH 4 [facility] EEG        Today's Diagnoses     Partial epilepsy with impairment of consciousness (H)    -  1       Follow-ups after your visit        Your next 10 appointments already scheduled     Nov 29, 2018  1:50 PM CST   (Arrive by 1:35 PM)   Return Visit with King Loius MD   [facility] ([facility])    [address] [place] [address]  4th Floor  [facility] [address] [phone]            Dec 03, 2018   Procedure with Guru Jose Kelly MD   [facility], [place], Same Day Surgery (--)    [address] [facility] [address] [phone]            Jan 02, 2019  1:00 PM CST   Adult Med Follow UP with Leroy Alcaraz MD   [facility]iat[facility] ([facility])    [place] [id number]  [address] [address] [phone]            Feb 13, 2019  9:00 AM CST   Lab with  LAB   [facility] ([facility])    [address] [place] [address]  1st [facility] [id number] [phone]            Feb 13, 2019 10:00 AM CST   (Arrive by 9:45 AM)   Return General Liver with Beatriz Tanner MD   [facility] Hepatology ([facility])    [address] [place] [address]  [address]  [facility] [id number] [phone]            Apr 26, 2019 10:00 AM CDT   MR BRAIN W/O & W CONTRAST with [address] [facility] ([facility])    [address] [place] [address]  1st [facility] [id number] [phone]           How do I prepare for my exam? (Food and drink instructions) **If you will be receiving sedation or general anesthesia, please see special notes below.**  How do I prepare for my exam? (Other instructions) Take  your medicines as usual, unless your doctor tells you not to. You may or may not receive intravenous (IV) contrast for this exam pending the discretion of the Radiologist.  You do not need to do anything special to prepare.  **If you will be receiving sedation or general anesthesia, please see special notes below.**  What should I wear: The MRI machine uses a strong magnet. Please wear clothes without metal (snaps, zippers). A sweatsuit works well, or we may give you a hospital gown. Please remove any body piercings and hair extensions before you arrive. You will also remove watches, jewelry, hairpins, wallets, dentures, partial dental plates and hearing aids. You may wear contact lenses, and you may be able to wear your rings. We have a safe place to keep your personal items, but it is safer to leave them at home.  How long does the exam take: Most tests take 30 to 60 minutes.  HOWEVER, IF YOUR DOCTOR PRESCRIBES ANESTHESIA please plan on spending four to five hours in the recovery room.  What should I bring:  Bring a list of your current medicines to your exam (including vitamins, minerals and over-the-counter drugs).  Do I need a :  **If you will be receiving sedation or general anesthesia, please see special notes below.**  What should I do after the exam: No Restrictions, You may resume normal activities.  What is this test: MRI (magnetic resonance imaging) uses a strong magnet and radio waves to look inside the body. An MRA (magnetic resonance angiogram) does the same thing, but it lets us look at your blood vessels. A computer turns the radio waves into pictures showing cross sections of the body, much like slices of bread. This helps us see any problems more clearly. You may receive fluid (called  contrast ) before or during your scan. The fluid helps us see the pictures better. We give the fluid through an IV (small needle in your arm).  Who should I call with questions:  Please call the Imaging  Department at your exam site with any questions. Directions, parking instructions, and other information is available on our website, [email or web address].  How do I prepare if I m having sedation or anesthesia? **IMPORTANT** THE INSTRUCTIONS BELOW ARE ONLY FOR THOSE PATIENTS WHO HAVE BEEN TOLD THEY WILL RECEIVE SEDATION OR GENERAL ANESTHESIA DURING THEIR MRI PROCEDURE:  IF YOU WILL RECEIVE SEDATION (take medicine to help you relax during your exam): You must get the medicine from your doctor before you arrive. Bring the medicine to the exam. Do not take it at home. Arrive one hour early. Bring someone who can take you home after the test. Your medicine will make you sleepy. After the exam, you may not drive, take a bus or take a taxi by yourself. No eating 8 hours before your exam. You may have clear liquids up until 4 hours before your exam. (Clear liquids include water, clear tea, black coffee and fruit juice without pulp.)  IF YOU WILL RECEIVE ANESTHESIA (be asleep for your exam): Arrive 1 1/2 hours early. Bring someone who can take you home after the test. You may not drive, take a bus or take a taxi by yourself. No eating 8 hours before your exam. You may have clear liquids up until 4 hours before your exam. (Clear liquids include water, clear tea, black coffee and fruit juice without pulp.)            May 03, 2019 10:45 AM CDT   (Arrive by 10:30 AM)   Return Visit with Lauro Shaw MD   [facility] ([facility])    [address] [place] [address]  [address] [place] [address] [phone]              Who to contact     Please call your clinic at [phone] to:    Ask questions about your health    Make or cancel appointments    Discuss your medicines    Learn about your test results    Speak to your doctor            Additional Information About Your Visit        [employer] Information     [employer] gives you secure access to your electronic health record. If you see a  primary care provider, you can also send messages to your care team and make appointments. If you have questions, please call your primary care clinic.  If you do not have a primary care provider, please call [phone] and they will assist you.      [employer] is an electronic gateway that provides easy, online access to your medical records. With [employer], you can request a clinic appointment, read your test results, renew a prescription or communicate with your care team.     To access your existing account, please contact your [facility] or call [phone] for assistance.        Care EveryWhere ID     This is your Care EveryWhere ID. This could be used by other organizations to access your [place] medical records  [id number]         Blood Pressure from Last 3 Encounters:   11/28/18 103/60   11/12/18 127/88   11/08/18 122/83    Weight from Last 3 Encounters:   11/27/18 71.9 kg (158 lb 9.6 oz)   11/07/18 82.8 kg (182 lb 8 oz)   10/24/18 77.5 kg (170 lb 12.8 oz)              Today, you had the following     No orders found for display         Today's Medication Changes      Notice     This visit is during an admission. Changes to the med list made in this visit will be reflected in the After Visit Summary of the admission.             Primary Care Provider Office Phone # Fax #    [place] [phone]       [address] [address]        Equal Access to Services     SAMUEL FAIR : Hadii kevin Thomas, waaxda lucarimna, qaybta kaalmada kamla, josue tamayo. So [facility] [phone].    ATENCIÓN: Si habla español, tiene a chiang disposición servicios gratuitos de asistencia lingüística. Llame al [phone].    We comply with applicable federal civil rights laws and [place] laws. We do not discriminate on the basis of race, color, national origin, age, disability, sex, sexual orientation, or gender  identity.            Thank you!     Thank you for choosing [facility]  for your care. Our goal is always to provide you with excellent care. Hearing back from our patients is one way we can continue to improve our services. Please take a few minutes to complete the written survey that you may receive in the mail after your visit with us. Thank you!             Your Updated Medication List - Protect others around you: Learn how to safely use, store and throw away your medicines at www.disposemymeds.org.      Notice     This visit is during an admission. Changes to the med list made in this visit will be reflected in the After Visit Summary of the admission.

## 2018-11-28 NOTE — PLAN OF CARE
Problem: Patient Care Overview  Goal: Plan of Care/Patient Progress Review  Outcome: No Change  VSS on RA. Continues on EEG monitor. Continues w/ VPM monitoring. Bed alarm on. Repositioning q2h. Pt somnolent from 2300 to 0400 and unable to assess orientation. Alert and following commands around 0400. Given PRN PO lactulose at 0100 and 0300, but aspirated the 0300 dose and 0500 dose was held. MD notified and order placed for IR tube feeding placement to continue oral lactulose administration. Pt had large BM at 0600 and began answering questions and following more commands. At 0600 oriented to person, but not to place, time, or situation. Continue to monitor output and LOC and follow plan of care.

## 2018-11-28 NOTE — PLAN OF CARE
Problem: Patient Care Overview  Goal: Plan of Care/Patient Progress Review  Discharge Planner SLP   Patient plan for discharge: none stated  Current status: SLP: Clinical swallow eval completed per MD orders. Pt presents with mild-moderate oropharyngeal dysphagia in the setting of AMS. Pt tolerated thin liquids, pureed textures, and regular solid textures with no overt s/sx of aspiration. However pt noted to require prolonged time for AP transit across all trials and with intermittent bolus holding. Pt with moderate oral residuals following regular solid textures and required liquid wash to clear. Recommend pt initiate dysphagia diet 2 and thin liquids with 1:1 supervision. Pt should be fully upright and alert for all PO, take small sips/bites, slow pacing, and alternate between consistencies. ST to continue to follow for diet tolerance and advanced trials as appropriate. Anticipate pt will require ongoing ST upon discharge.   Barriers to return to prior living situation: dysphagia; AMS  Recommendations for discharge: defer to team  Rationale for recommendations: pt would benefit from continued ST to safely return to baseline diet level        Entered by: Ne Saab 11/28/2018 10:27 AM

## 2018-11-28 NOTE — PROGRESS NOTES
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 Neurology was consulted after EEG showed abnormal findings of BiPLEDs (by verbal report from reading epileptologist). Ativan challenge completed 11/27 with unclear results at this time.    Interval History: No acute events overnight. He remains on VEEG monitoring. No report of seizure activity. Mental status seems about the same as it was yesterday when he was getting hooked up to EEG.            Medications:     Prescription Medications as of 11/28/2018             blood glucose monitoring (ONETOUCH ULTRA) test strip Use to test blood sugars 4 times daily as needed or as directed.    Calcium Carb-Cholecalciferol (CALCIUM 500 +D) 500-400 MG-UNIT TABS Take 500 mg by mouth daily    CANE, ANY MATERIAL One cane    carBAMazepine (TEGRETOL) 200 MG tablet Take 1 tablet (200 mg) by mouth 2 times daily    ciclopirox (LOPROX) 0.77 % cream Apply topically 2 times daily To feet and toenails.    cyanocobalamin 1000 MCG TABS Take 1 tablet by mouth daily    ferrous sulfate (IRON) 325 (65 FE) MG tablet Take 1 tablet (325 mg) by mouth 2 times daily    furosemide (LASIX) 20 MG tablet Take 1 tablet (20 mg) by mouth daily    gabapentin (NEURONTIN) 100 MG capsule Take 1 capsule (100 mg) by mouth At Bedtime    HYDROcodone-acetaminophen (NORCO) 5-325 MG per tablet Take 2 tablets by mouth every 6 hours as needed for moderate to severe pain    hydrOXYzine (ATARAX) 25 MG tablet Take 1 tablet (25 mg) by mouth 2 times daily    lactulose  "(CHRONULAC) 10 GM/15ML solution Take 60 mLs by mouth 2 times daily    levothyroxine (SYNTHROID/LEVOTHROID) 88 MCG tablet Take 1 tablet (88 mcg) by mouth daily    lidocaine (LMX4) 4 % CREA cream Apply topically once as needed for mild pain    methylphenidate (RITALIN) 10 MG tablet Take 1 tablet (10 mg) by mouth 2 times daily    miconazole (MICATIN; MICRO GUARD) 2 % powder Apply topically 2 times daily To groin as needed    mirtazapine (REMERON) 45 MG tablet Take 1 tablet (45 mg) by mouth At Bedtime    multivitamin, therapeutic with minerals (THERA-VIT-M) TABS Take 1 tablet by mouth 2 times daily    omeprazole (PRILOSEC) 20 MG CR capsule Take 2 capsules (40 mg) by mouth 2 times daily    ONE TOUCH LANCETS MISC by In Vitro route 4 times daily as needed    order for DME Equipment being ordered: manual wheelchair and cushion    [employer] light weight Sx5 18x18, 17.5 inch floor height and a 2 inch 18x18 inch cushion    length of need : lifetime        Patient's height : 5/10\", weight : 172 pounds    pravastatin (PRAVACHOL) 80 MG tablet Take 1 tablet (80 mg) by mouth daily    spironolactone (ALDACTONE) 25 MG tablet Take 2 tablets (50 mg) by mouth daily    thiamine (VITAMIN B-1) 100 MG tablet Take 1 tablet (100 mg) by mouth daily    traZODone (DESYREL) 50 MG tablet Take 1 tablet (50 mg) by mouth nightly as needed for sleep    Vitamin D, Cholecalciferol, 1000 units TABS Take 1 tablet by mouth daily    XIFAXAN 550 MG TABS tablet TAKE ONE TABLET BY MOUTH TWICE DAILY      Facility Administered Medications as of 11/28/2018             albumin human 25 % injection 25 g Inject 100 mLs (25 g) into the vein once    albumin human 25 % injection 75 g Inject 300 mLs (75 g) into the vein daily    calcium carbonate 500 mg-vitamin D 200 units (OSCAL with D;OYSTER SHELL CALCIUM) per tablet 1 tablet Take 1 tablet by mouth daily    carBAMazepine (TEGretol) tablet 200 mg Take 1 tablet (200 mg) by mouth 2 times daily    cyanocobalamin (vitamin  " "B-12) tablet 1,000 mcg Take 1 tablet (1,000 mcg) by mouth daily    Daily 2 GRAM acetaminophen limit, unless fulminent liver failure or transaminases greater than or equal to 300 - 400, then none continuous prn    ferrous sulfate (IRON) tablet 325 mg Take 1 tablet (325 mg) by mouth 2 times daily    gabapentin (NEURONTIN) capsule 100 mg Take 1 capsule (100 mg) by mouth At Bedtime    lactulose (CHRONULAC) solution 20 g 30 mLs (20 g) by Oral or NG Tube route every 2 hours as needed (Any time symptoms of Hepatic Encephalopathy (HE) are noted, RN to implement Lactulose Nurse Initiated Protocol. If symptoms of HE recur, RN will re-implement the protocol. RN to notify primary team each time HE Nurse Initiated Protocol is implemented)    Linked Group 1:  \"Or\" Linked Group Details     lactulose (CHRONULAC) solution 60 mL Take 60 mLs by mouth 2 times daily    lactulose (CHRONULAC) solution for enema prep 100 g Place 150 mLs (100 g) rectally every 2 hours as needed (Any time symptoms of HE(HE) are noted, RN to implement Lactulose Nurse Initiated Protocol. If symptoms of HE recur, RN will re-implement the protocol. RN to notify primary team each time HE Nurse Initiated Protocol is implemented.)    Linked Group 1:  \"Or\" Linked Group Details     levothyroxine (SYNTHROID/LEVOTHROID) tablet 88 mcg Take 1 tablet (88 mcg) by mouth every morning (before breakfast)    LORazepam (ATIVAN) injection 2 mg Inject 1 mL (2 mg) into the vein once    melatonin tablet 1 mg Take 1 tablet (1 mg) by mouth nightly as needed for sleep    miconazole (MICATIN; MICRO GUARD) 2 % powder Apply topically 2 times daily as needed for other (Apply to groin as needed)    mirtazapine (REMERON) tablet 45 mg Take 3 tablets (45 mg) by mouth At Bedtime    naloxone (NARCAN) injection 0.1-0.4 mg Inject 0.25-1 mLs (0.1-0.4 mg) into the vein every 2 minutes as needed for opioid reversal    omeprazole (priLOSEC) CR capsule 40 mg Take 2 capsules (40 mg) by mouth 2 times " "daily (before meals)    ondansetron (ZOFRAN) injection 4 mg Inject 2 mLs (4 mg) into the vein every 6 hours as needed for nausea or vomiting    Linked Group 2:  \"Or\" Linked Group Details     ondansetron (ZOFRAN-ODT) ODT tab 4 mg Take 1 tablet (4 mg) by mouth every 6 hours as needed for nausea or vomiting    Linked Group 2:  \"Or\" Linked Group Details     pravastatin (PRAVACHOL) tablet 80 mg Take 2 tablets (80 mg) by mouth daily    rifaximin (XIFAXAN) tablet 550 mg Take 1 tablet (550 mg) by mouth 2 times daily    senna-docusate (SENOKOT-S;PERICOLACE) 8.6-50 MG per tablet 1 tablet Take 1 tablet by mouth 2 times daily    Linked Group 3:  \"Or\" Linked Group Details     senna-docusate (SENOKOT-S;PERICOLACE) 8.6-50 MG per tablet 2 tablet Take 2 tablets by mouth 2 times daily    Linked Group 3:  \"Or\" Linked Group Details     sodium chloride (PF) 0.9% PF flush 20 mL 20 mLs by Intracatheter route once    vitamin B1 (THIAMINE) tablet 100 mg Take 1 tablet (100 mg) by mouth daily    vitamin D3 (CHOLECALCIFEROL) tablet 1,000 Units Take 1 tablet (1,000 Units) by mouth daily    ibuprofen (ADVIL/MOTRIN) tablet 600 mg (Discontinued) Take 3 tablets (600 mg) by mouth every 6 hours as needed for other (mild pain)                Review of Systems:   Review of systems not obtained due to patient factors - mental status         Physical Exam:   /60 (BP Location: Right arm)  Pulse 67  Temp 96.4  F (35.8  C) (Oral)  Resp 16  Wt 71.9 kg (158 lb 9.6 oz)  SpO2 98%  BMI 22.76 kg/m2     General: NAD  Resp: CTAB, no w/r/r  CVC: RRR, no m/r/g  Abdomen: Distended, minimal bowel sounds  Skin: Pale  Extremities: No edema in LE    Neurologic:   Mental Status: Alert, attends to examiner. Unable to answer orientation questions; when given yes/no options he indicated he was in a hospital. He responds slowly to questions. Follows some simple commands.   Cranial Nerves: Pupils mildly asymmetric R>L, both reactive to light. EOMI. Facial movements " symmetric. Hearing intact to conversation. Palate elevation symmetric, uvula midline.   Motor: Asterixis noted. Normal tone throughout. Strength 5/5 in b/l UE; strength 5/5 in L hip flexor, dorsi-/plantarflexor; no antigravity movement in R hip flexor, weak dorsi-/plantarflexor  Reflexes: Mildly increased on RLE, no appreciable babinski or clonus  Sensory: Intact/symmetric to light touch throughout upper and lower extremities.   Coordination: Not participatory, likely some mild ataxia grossly  Station/Gait: Deferred            Data:   CBC:  Lab Results   Component Value Date    WBC 1.5 11/28/2018     Lab Results   Component Value Date    HGB 9.8 11/28/2018     Lab Results   Component Value Date    HCT 30.3 11/28/2018     Lab Results   Component Value Date    PLT 32 11/28/2018       Last Basic Metabolic Panel:  Lab Results   Component Value Date     11/28/2018      Lab Results   Component Value Date    POTASSIUM 3.6 11/28/2018     Lab Results   Component Value Date    CHLORIDE 112 11/28/2018     Lab Results   Component Value Date    UCHE 8.9 11/28/2018     Lab Results   Component Value Date    CO2 22 11/28/2018     Lab Results   Component Value Date    BUN 30 11/28/2018     Lab Results   Component Value Date    CR 1.39 11/28/2018     Lab Results   Component Value Date    GLC 99 11/28/2018            Assessment and Recommendations:     # Hepatic encephalopathy:  Mono Varghese is a 48 year old male with history of seizure disorder, left temporal oligoastrocytoma s/p partial resection and chemoradiation, ETOH cirrhosis c/b ascites who presents with likely hepatic encephalopathy. Given his seizure history there was concern about possible seizure activity and EEG was ordered. This demonstrated BiPLEDs which can be epileptogenic and nonepileptogenic (ie can be seen in encephalopathic states). These reportedly stopped prior to ativan challenge yesterday and did not recur. Presumed to be due to encephalopathy at this  point. Recommend continuing current doses of AEDs and treatment of underlying medical conditions.     Recommendations:  -Continue carbamazepine 200mg BID, if unable to take PO would bridge with levetiracetam IV therapy until able to get back on carbamazepine  -Continue VEEG monitoring until official read is in (if negative this afternoon can consider discontinuing)    Patient seen and discussed with attending physician Dr. Verdin.    Vahe Mcgrath  Neurology PGY-3

## 2018-11-29 NOTE — PLAN OF CARE
Problem: Patient Care Overview  Goal: Plan of Care/Patient Progress Review  Outcome: Improving  Patient arouses to voice, intermittently able to give yes/no answers to questions. Appears oriented to location. Pt slept in between cares and safety checks. Seizure pads on for safety, vEEG in progress, has had 2 ativan challenges. PRN lactulose given 1x, 1 stool during shift. Bedside attendant present. Pt observed to swallow liquids without incident. Will continue to monitor and alert MDs to any changes.

## 2018-11-29 NOTE — PLAN OF CARE
Problem: Patient Care Overview  Goal: Plan of Care/Patient Progress Review  Discharge Planner SLP   Patient plan for discharge: none stated  Current status: SLP: Pt with improved PO tolerance today, however pt continues to require cues to enforce safe swallow strategies. Recommend advance to regular textures and thin liquids with 1:1 supervision. Caregivers to assist with tray set-up (dice food into small pieces), encourage small sips/bites, and slow pacing.   Barriers to return to prior living situation: dysphagia; AMS  Recommendations for discharge: defer to PT/OT  Rationale for recommendations: anticipate pt will meet goals prior to discharge       Entered by: Ne Saab 11/29/2018 10:28 AM

## 2018-11-29 NOTE — MR AVS SNAPSHOT
After Visit Summary   11/29/2018    Mono Varghese    MRN: [id number]           Patient Information     Date Of Birth          1970        Visit Information        Provider Department      11/29/2018 7:00 AM [facility] EEG TECH 4 [facility] EEG        Today's Diagnoses     Encephalopathy    -  1       Follow-ups after your visit        Your next 10 appointments already scheduled     Nov 29, 2018  1:50 PM CST   (Arrive by 1:35 PM)   Return Visit with King Louis MD   [facility] ([facility])    [address]  4th Floor  [facility] [address] [phone]            Dec 03, 2018   Procedure with Guru Jose Kelly MD   [facility] [place], Same Day Surgery (--)    [address] [address] [phone]            Jan 02, 2019  1:00 PM CST   Adult Med Follow UP with Leroy Alcaraz MD   [facility] ([facility])    [place] [id number]  [address] [id number] [phone]            Feb 13, 2019  9:00 AM CST   Lab with  LAB   [facility] Lab ([facility])    [address] [place] [address]  1st Floor  [facility] [address] [phone]            Feb 13, 2019 10:00 AM CST   (Arrive by 9:45 AM)   Return General Liver with Beatriz Tanner MD   [facility] Hepatology ([facility])    [address] [place] [address]  [address]  [facility] [address] [phone]            Apr 26, 2019 10:00 AM CDT   MR BRAIN W/O & W CONTRAST with [address] [facility] ([facility])    [address]  1st Floor  [facility] [address] [phone]           How do I prepare for my exam? (Food and drink instructions) **If you will be receiving sedation or general anesthesia, please see special notes below.**  How do I prepare for my exam? (Other instructions) Take your medicines as usual, unless your  doctor tells you not to. You may or may not receive intravenous (IV) contrast for this exam pending the discretion of the Radiologist.  You do not need to do anything special to prepare.  **If you will be receiving sedation or general anesthesia, please see special notes below.**  What should I wear: The MRI machine uses a strong magnet. Please wear clothes without metal (snaps, zippers). A sweatsuit works well, or we may give you a hospital gown. Please remove any body piercings and hair extensions before you arrive. You will also remove watches, jewelry, hairpins, wallets, dentures, partial dental plates and hearing aids. You may wear contact lenses, and you may be able to wear your rings. We have a safe place to keep your personal items, but it is safer to leave them at home.  How long does the exam take: Most tests take 30 to 60 minutes.  HOWEVER, IF YOUR DOCTOR PRESCRIBES ANESTHESIA please plan on spending four to five hours in the recovery room.  What should I bring:  Bring a list of your current medicines to your exam (including vitamins, minerals and over-the-counter drugs).  Do I need a :  **If you will be receiving sedation or general anesthesia, please see special notes below.**  What should I do after the exam: No Restrictions, You may resume normal activities.  What is this test: MRI (magnetic resonance imaging) uses a strong magnet and radio waves to look inside the body. An MRA (magnetic resonance angiogram) does the same thing, but it lets us look at your blood vessels. A computer turns the radio waves into pictures showing cross sections of the body, much like slices of bread. This helps us see any problems more clearly. You may receive fluid (called  contrast ) before or during your scan. The fluid helps us see the pictures better. We give the fluid through an IV (small needle in your arm).  Who should I call with questions:  Please call the Imaging Department at your exam site with any  questions. Directions, parking instructions, and other information is available on our website, [email or web address].  How do I prepare if I m having sedation or anesthesia? **IMPORTANT** THE INSTRUCTIONS BELOW ARE ONLY FOR THOSE PATIENTS WHO HAVE BEEN TOLD THEY WILL RECEIVE SEDATION OR GENERAL ANESTHESIA DURING THEIR MRI PROCEDURE:  IF YOU WILL RECEIVE SEDATION (take medicine to help you relax during your exam): You must get the medicine from your doctor before you arrive. Bring the medicine to the exam. Do not take it at home. Arrive one hour early. Bring someone who can take you home after the test. Your medicine will make you sleepy. After the exam, you may not drive, take a bus or take a taxi by yourself. No eating 8 hours before your exam. You may have clear liquids up until 4 hours before your exam. (Clear liquids include water, clear tea, black coffee and fruit juice without pulp.)  IF YOU WILL RECEIVE ANESTHESIA (be asleep for your exam): Arrive 1 1/2 hours early. Bring someone who can take you home after the test. You may not drive, take a bus or take a taxi by yourself. No eating 8 hours before your exam. You may have clear liquids up until 4 hours before your exam. (Clear liquids include water, clear tea, black coffee and fruit juice without pulp.)            May 03, 2019 10:45 AM CDT   (Arrive by 10:30 AM)   Return Visit with Lauro Shaw MD   [facility] ([facility])    [address] [place] [address]  [address] [place] [address] [phone]              Who to contact     Please call your clinic at [phone] to:    Ask questions about your health    Make or cancel appointments    Discuss your medicines    Learn about your test results    Speak to your doctor            Additional Information About Your Visit        [employer] Information     [employer] gives you secure access to your electronic health record. If you see a primary care provider, you can also send  messages to your care team and make appointments. If you have questions, please call your primary care clinic.  If you do not have a primary care provider, please call [phone] and they will assist you.      "[employer] [place]" is an electronic gateway that provides easy, online access to your medical records. With "[employer] [place]", you can request a clinic appointment, read your test results, renew a prescription or communicate with your care team.     To access your existing account, please contact your [facility] or call [phone] for assistance.        Care EveryWhere ID     This is your Care EveryWhere ID. This could be used by other organizations to access your [facility] medical records  [id number]         Blood Pressure from Last 3 Encounters:   11/29/18 98/68   11/12/18 127/88   11/08/18 122/83    Weight from Last 3 Encounters:   11/27/18 71.9 kg (158 lb 9.6 oz)   11/07/18 82.8 kg (182 lb 8 oz)   10/24/18 77.5 kg (170 lb 12.8 oz)              Today, you had the following     No orders found for display         Today's Medication Changes      Notice     This visit is during an admission. Changes to the med list made in this visit will be reflected in the After Visit Summary of the admission.             Primary Care Provider Office Phone # Fax #    King Louis [phone]       [address] [address]        Equal Access to Services     [facility] : Hadii kevin mendoza hadasho Sojanna, waaxda luqadaha, qaybta kaalmada adesiobhanyada, josue roque . So [facility] [phone].    ATENCIÓN: Si habla español, tiene a chiang disposición servicios gratuitos de asistencia lingüística. Llame al [phone].    We comply with applicable federal civil rights laws and [place] laws. We do not discriminate on the basis of race, color, national origin, age, disability, sex, sexual orientation, or gender identity.            Thank you!     Thank you  for choosing [facility]  for your care. Our goal is always to provide you with excellent care. Hearing back from our patients is one way we can continue to improve our services. Please take a few minutes to complete the written survey that you may receive in the mail after your visit with us. Thank you!             Your Updated Medication List - Protect others around you: Learn how to safely use, store and throw away your medicines at www.disposemymeds.org.      Notice     This visit is during an admission. Changes to the med list made in this visit will be reflected in the After Visit Summary of the admission.

## 2018-11-29 NOTE — PROCEDURES
Procedure Date: 11/28/2018   EEG #-2   DATE OF RECORDING/SERVICE DATE:  11/28/2018   SOURCE FILE DURATION:  23:23:44   VIDEO EEG MONITORING, DAY NUMBER 2      HISTORY:  This is day number 2 of video EEG monitoring of a 48-year-old patient with a past medical history of hepatic encephalopathy as well as a left parietal astrocytoma resection, who has had worsened encephalopathy plus weakness since 11/24/2018.  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      MEDICATIONS:   1.  Tegretol 200 mg b.i.d.   2.  Neurontin 100 mg at bedtime.   3.  Remeron 45 mg at bedtime.     4.  Ativan was given at 13:49 as a challenge.     TECHNICAL SUMMARY: This continuous video- EEG monitoring procedure was performed with 23 scalp electrodes in 10-20 electrode system placement, and additional scalp, precordial and other surface electrodes used for electrical referencing and artifact detection.  Video monitoring was utilized and periodically reviewed by EEG technologists and the physician for electroclinical correlations.     FINDINGS:   BACKGROUND:  Throughout the recording, there is no well-defined parietooccipital rhythm nor any prominent sleep architecture seen.  There was generalized slowing of delta and theta frequencies throughout the recording.  There are faster frequencies seen in the right hemisphere compared with the left.  There were periods with monomorphic relatively higher amplitude 3-4 Hz delta activities over the left hemisphere, maximal over T5, T3, P3 and O1 leads that are sharply contoured.     EPILEPTIFORM ACTIVITY:  There are infrequent spikes over the left temporal region with phase reversal at T5 occurring throughout the recording.     EVENTS:  At 12:59:30, the patient was asked by nursing staff how he is feeling and he indicates he is feeling funny.  There is no other description as to physical or behavioral events by the patient  or nursing staff.  There is no electrographic change during this event.      ATIVAN CHALLENGE:  At 13:46:33, the patient is asked multiple questions relating to counting backwards, of which he can do appropriately as well as orientation questions relating to the month, his name and the year, of which he does not respond.  The patient is also asked to point to the ceiling, wave hello, of which he can do appropriately but could not touch his nose with his finger or show 2 fingers or do multiple step commands.  At 13:51:45, the patient is given 2 mg of Ativan and at 1400, he is repeatedly asked the previous questions and tasks of which he cannot answer or perform any.  Electrographically, the patient has continued sharply contoured delta and theta frequencies. These do not change with delivery of Ativan in amplitude or frequency from previously mentioned background.      ICTAL ACTIVITY:  None.      IMPRESSION:  This is an abnormal day 2 video EEG due to the presence of generalized slowing as well as focal high amplitude slowing in the left posterior hemisphere.  The patient does not have any changes electrographically or clinically with Ativan challenge given today, specifically regarding the high amplitude monoform delta activities over the left hemisphere.  These findings would support a moderate to severe encephalopathy with increased cerebral dysfunction seen in the left hemisphere, and a breach rhythm over the left posterior region. There were infrequent epileptiform discharges over the left posterior temporal region; however, there were no seizures recorded during this period.  Further clinical correlation is advised.        As dictated by DIONISIO LACY DO. CNP Fellow.     I personally reviewed the video EEG and agree with the reported findings.    Marcelina Milan MD             D: 11/29/2018   T: 11/29/2018   MT: STELLA      Name:     SALO MADERA   MRN:      [id number]        Account:         [id number]   :      1970           Procedure Date: 2018      Document: [id number]

## 2018-11-29 NOTE — PLAN OF CARE
Problem: Patient Care Overview  Goal: Plan of Care/Patient Progress Review  Outcome: Improving  Status: Patient only oriented to person, however is able to effectively communicate. VSS. Breathing adequately on RA.    GI: Incontinent, had 3 watery green BMs. Passing flatus. Regular diet, tolerated thin liquids well.   : Incontinent, voiding appropriately.   IV: Port-a-cath saline locked, site WDL.   Activity: Remained in bed all shift, assist of 1-2 when turning/repositioning.   Pain: Complaints of mild headache pain.   Skin: WDL, some redness on bottom noted. Barrier cream applied.   Plan of care:  Pt to have MRI of brain to assess seizure activity. Continue to monitor and follow POC.

## 2018-11-29 NOTE — PROGRESS NOTES
[facility], [place]    Internal Medicine Progress Note - [place] 3 Service    Main Plans for Today   - discontinue EEG  - begin keppra 750 mg BID, eventually taper off of Carbamezapine per neurology  - obtain MRI head without contrast  - consider palliative care consult tomorrow    Assessment & Plan   Mono KAREN Varghese is a 48 year old male with history of left parietal astrocytoma s/p resection, ESLD c/b refractory ascites requiring every 2 week genia (removing 8-12 L each time and receiving albumin), hepatic encephalopathy, rectal and esophageal varices and PVT (not currently on anticoagulation medication), and hypothyroidism, who presents with encephalopathy and generalized weakness.      # Encephalopathy  # Alcoholic cirrhosis c/b refractory ascites, MELD 19  Recently discharged on 11/8/18. Presenting with confusion and generalized weakness. Awake but not responsive. Ammonia 148. Exam notable for abdominal distention. Patient had been previously scheduled to undergo paracentesis today. Lactulose given in the ED. Asterixis on exam so likely HE. Asterixis improved. Diagnostic para without evidence of SBP. Ammonia decreased significantly in first days of admission. Mentation much improved 2 days after admission.   - McLain protocol, goal ~6 BMs every day  - Lactulose scheduled TID  - Continue PTA Rifaximin  - Consider therapeuic paracentesis once patient clears and JOSEF resolves  - F/u blood cultures: NGTD  - Daily CMP and CBC, continue to monitor   - Fluids: 3-day albumin challenge (1g/kg/day), complete  - Vitamin K 10 mg x 3 days     # Generalized weakness  # History of seizures  # Parietal astrocytoma s/p partial resection, chemo, XRT in 2012 c/b seizures    Patient presents with AMS and generalized weakness. CT head on admission with no acute intracranial pathology. Stable postsurgical changes are seen, no changes compared to prior CT 9/14/18. Previously noted that due to  neurological structural abnormalities, pt is more so sensitive to small metabolic changes, which may precipitate encephalopathy. DDx for Mr. Varghese includes metabolic, including hepatic encephalopathy, seizure, infection, and medication. Ammonia elevated above baseline and asterixis on exam. Patient has been taking medications including carbamazepine as prescriped. Most likely AMS is due to hepatic encephalopathy. During prior hospitalization, CT angio head/neck and MRI brain w/ and w/o contrast negative. Weakness is likely a chronic problem; myopathy was previously considered but was thought to be less likely. vEEG with some concerning discharges, though clear seizure activity as of yet unconfirmed. S/p 2x ativan challenges without clear improvement, seizure activity less likely per neuro.   - MRI head w/out contrast per neuro    - Neurology consulted, appreciate recs   - Continue PTA carbamazepine (eventually taper off), add 750 mg BID keppra    - Carbamezapine taper:     - on 12/6/18:  200-150mg x 7 days     - on 12/13/18: 150mg BID x 7 days     - on 12/20/18: 150mg in  mg in PM x 7 days     - on 12/28/18: 100 mg BID x 7 days     - on 1/3/19: 100 mg in AM - 50 mg in PM x 7 days     - on 1/10/18: 50 mg BID x 7 days     - on 1/20/18: 50 mg qAM x 7 days     - stop       #JOSEF  Creatinine 1.62 on admission (baseline ~0.8). UA with not concerning for infection. Improved to 1.48 with fluids. Undergoing albumin challenge currently. Improving.  - Hold PTA spironolactone, lasix for now  - Albumin/LR bolus PRN  - Continue to trend Cr     # Malnutrition  - Continue PTA folic acid, methylphenidate, thiamine, D3  - Speech and Swallow consult           - If eventually cleared, place on high protein/KCal diet, AMS/HE limiting at this time     # Cough  Chronic non-productive cough, afebrile, CXR unremarkable on admission. Likely due to abdominal distension due to worsening ascites. Might have improvement after therapeutic  paracentesis at come point.   - continue to monitor   - consider therapeutic para when JOSEF resolved and patient more stable     # Pancytopenia  Hg 12.3, WBC 3.9, Plt 41 on admission. Has worsened and perhaps found alisha. No source of active bleeding.  - Continue to trend     Diet: Dysphagia Diet Level 2 [facility] Altered Thin Liquids (water, ice chips, juice, milk gelatin, ice cream, etc) (1:1 supervision, see speech note for recs)  Fluids: NPO  Lines: PIV, port   Munoz Catheter: not present    DVT Prophylaxis: Pneumatic Compression Devices  Code Status: DNR/DNI  Expected discharge: 4 - 7 days, recommended to prior living arrangement once mental status at baseline.    The patient's care was discussed with the Attending Physician, Dr. Graves.    Arpan Haley [facility]  [facility] 3  Pager: [phone]  Please see sticky note for cross cover information    Interval History    Again, mentation continues to improve, A&O x 2, wife feels that is isn't all they way back to baseline mentation but he is close. Able to follow simple commands and have brief conversation before unable to continue thoughts.     ROS negative for CP, HA, shortness of breath, fevers, chills.     Wife and sister updated at bedside.     Physical Exam   Vital Signs: Temp: 96  F (35.6  C) Temp src: Axillary BP: 107/62 Pulse: 63 Heart Rate: 71 Resp: 18 SpO2: 98 % O2 Device: None (Room air)    Weight: 158 lbs 9.6 oz  General Appearance: Awake, mildly disoriented, cachetic   Respiratory: CTAB, comfortable on room air  Cardiovascular: RRR, no m/r/c/g  GI: distended abdomen with fluid wave, nontender to palpation, no organomegaly apprecaited  Skin: globally jaundiced  Other: global weakness      Data     Recent Labs  Lab 11/29/18  0726 11/28/18  0602 11/27/18  2059 11/27/18  0604 11/26/18  1832 11/26/18  1756   WBC 1.4* 1.5*  --  2.8*  --  3.9*   HGB 9.9* 9.8*  --  11.3*  --  12.3*    100  --  98  --  97   PLT 33* 32*  --  35*  --  41*    INR 1.74* 1.82*  --   --   --  1.54*    142 139 136  --  133   POTASSIUM 3.5 3.6 3.6 4.0  --  4.1   CHLORIDE 117* 112* 110* 108  --  105   CO2 19* 22 21 20  --  20   BUN 30 30 30 30  --  30   CR 1.19 1.39* 1.38* 1.48*  --  1.62*   ANIONGAP 8 8 8 8  --  8   UCHE 8.5 8.9 8.8 8.4*  --  8.3*   * 99 91 111*  --  171*   ALBUMIN 3.9 3.7  --  3.0*  --  3.3*   PROTTOTAL 6.3* 6.3*  --  6.2*  --  7.1   BILITOTAL 2.0* 2.4*  --  1.5*  --  1.3   ALKPHOS 200* 207*  --  241*  --  279*   ALT 28 27  --  29  --  30   AST 35 35  --  33  --  38   TROPONIN  --   --   --   --  0.01  --        No results found for this or any previous visit (from the past 24 hour(s)).  Medications     - MEDICATION INSTRUCTIONS -         calcium carbonate-vitamin D  1 tablet Oral Daily     carBAMazepine  200 mg Oral BID     vitamin B-12  1,000 mcg Oral Daily     ferrous sulfate  325 mg Oral BID     gabapentin  100 mg Oral At Bedtime     lactulose  60 mL Oral BID     levETIRAcetam  750 mg Oral BID     levothyroxine  88 mcg Oral QAM AC     mirtazapine  45 mg Oral At Bedtime     omeprazole  40 mg Oral BID AC     phytonadione  10 mg Oral Daily     pravastatin  80 mg Oral Daily     rifaximin  550 mg Oral BID     senna-docusate  1 tablet Oral BID    Or     senna-docusate  2 tablet Oral BID     vitamin B1  100 mg Oral Daily     vitamin D3  1,000 Units Oral Daily

## 2018-11-29 NOTE — PLAN OF CARE
Problem: Patient Care Overview  Goal: Plan of Care/Patient Progress Review  Outcome: No Change  Pt VSS on RA. Drowsy at beginning of shift but more alert as evening went on. When asked if he is in hospital he says yes. Not verbalizing much and having difficult with word finding. Shakes head no when asked if he has pain. 1 dose of prn lactulose given in addition to scheduled. Improving alertness noted after prn dose and BM. Total of 3 stools this shift. Pt continues on video EEG. Pt took pills whole one at a time but requires some prompting. Only ate a few bites from dinner tray. Pt turned Q2hrs to promote skin integrity.      Plan: continue to monitor mentation.

## 2018-11-29 NOTE — PROGRESS NOTES
[facility]  General Neurology Consult Follow Up  11/29/2018      Mono Varghese MRN# [id number]   YOB: 1970 Age: 48 year old              Summary and Interval History:       Summary: Mono Varghese is a 48 year old male with history of oligoastrocytoma s/p left temporal lobe resection with chemoradiation, ETOH cirrhosis c/b ascites, h/o hepatic encephalopathy who was admitted 11/26/18 with encephalopathy thought to be related to hepatic failure. Neurology was consulted after EEG showed abnormal findings of BiPLEDs (by verbal report from reading epileptologist). Ativan challenge completed 11/27 and again 11/28 without any changes clinically or electrographically.     Interval History: No acute events overnight. Reportedly no changes from ativan challenge yesterday. Continues to be encephalopathic although mildly improved.             Medications:     Prescription Medications as of 11/29/2018             blood glucose monitoring (ONETOUCH ULTRA) test strip Use to test blood sugars 4 times daily as needed or as directed.    Calcium Carb-Cholecalciferol (CALCIUM 500 +D) 500-400 MG-UNIT TABS Take 500 mg by mouth daily    CANE, ANY MATERIAL One cane    carBAMazepine (TEGRETOL) 200 MG tablet Take 1 tablet (200 mg) by mouth 2 times daily    ciclopirox (LOPROX) 0.77 % cream Apply topically 2 times daily To feet and toenails.    cyanocobalamin 1000 MCG TABS Take 1 tablet by mouth daily    ferrous sulfate (IRON) 325 (65 FE) MG tablet Take 1 tablet (325 mg) by mouth 2 times daily    furosemide (LASIX) 20 MG tablet Take 1 tablet (20 mg) by mouth daily    gabapentin (NEURONTIN) 100 MG capsule Take 1 capsule (100 mg) by mouth At Bedtime    HYDROcodone-acetaminophen (NORCO) 5-325 MG per tablet Take 2 tablets by mouth every 6 hours as needed for moderate to severe pain    hydrOXYzine (ATARAX) 25 MG tablet Take 1 tablet (25 mg) by mouth 2 times daily    lactulose (CHRONULAC) 10  "GM/15ML solution Take 60 mLs by mouth 2 times daily    levothyroxine (SYNTHROID/LEVOTHROID) 88 MCG tablet Take 1 tablet (88 mcg) by mouth daily    lidocaine (LMX4) 4 % CREA cream Apply topically once as needed for mild pain    methylphenidate (RITALIN) 10 MG tablet Take 1 tablet (10 mg) by mouth 2 times daily    miconazole (MICATIN; MICRO GUARD) 2 % powder Apply topically 2 times daily To groin as needed    mirtazapine (REMERON) 45 MG tablet Take 1 tablet (45 mg) by mouth At Bedtime    multivitamin, therapeutic with minerals (THERA-VIT-M) TABS Take 1 tablet by mouth 2 times daily    omeprazole (PRILOSEC) 20 MG CR capsule Take 2 capsules (40 mg) by mouth 2 times daily    ONE TOUCH LANCETS MISC by In Vitro route 4 times daily as needed    order for DME Equipment being ordered: manual wheelchair and cushion    [employer] light weight Sx5 18x18, 17.5 inch floor height and a 2 inch 18x18 inch cushion    length of need : lifetime        Patient's height : 5/10\", weight : 172 pounds    pravastatin (PRAVACHOL) 80 MG tablet Take 1 tablet (80 mg) by mouth daily    spironolactone (ALDACTONE) 25 MG tablet Take 2 tablets (50 mg) by mouth daily    thiamine (VITAMIN B-1) 100 MG tablet Take 1 tablet (100 mg) by mouth daily    traZODone (DESYREL) 50 MG tablet Take 1 tablet (50 mg) by mouth nightly as needed for sleep    Vitamin D, Cholecalciferol, 1000 units TABS Take 1 tablet by mouth daily    XIFAXAN 550 MG TABS tablet TAKE ONE TABLET BY MOUTH TWICE DAILY      Facility Administered Medications as of 11/29/2018             albumin human 25 % injection 75 g Inject 300 mLs (75 g) into the vein daily    calcium carbonate 500 mg-vitamin D 200 units (OSCAL with D;OYSTER SHELL CALCIUM) per tablet 1 tablet Take 1 tablet by mouth daily    carBAMazepine (TEGretol) tablet 200 mg Take 1 tablet (200 mg) by mouth 2 times daily    cyanocobalamin (vitamin  B-12) tablet 1,000 mcg Take 1 tablet (1,000 mcg) by mouth daily    Daily 2 GRAM acetaminophen " "limit, unless fulminent liver failure or transaminases greater than or equal to 300 - 400, then none continuous prn    ferrous sulfate (IRON) tablet 325 mg Take 1 tablet (325 mg) by mouth 2 times daily    gabapentin (NEURONTIN) capsule 100 mg Take 1 capsule (100 mg) by mouth At Bedtime    hydrOXYzine (ATARAX) tablet 25 mg Take 1 tablet (25 mg) by mouth 2 times daily as needed for itching    lactulose (CHRONULAC) solution 20 g 30 mLs (20 g) by Oral or NG Tube route every 2 hours as needed (Any time symptoms of Hepatic Encephalopathy (HE) are noted, RN to implement Lactulose Nurse Initiated Protocol. If symptoms of HE recur, RN will re-implement the protocol. RN to notify primary team each time HE Nurse Initiated Protocol is implemented)    Linked Group 1:  \"Or\" Linked Group Details     lactulose (CHRONULAC) solution 60 mL Take 60 mLs by mouth 2 times daily    lactulose (CHRONULAC) solution for enema prep 100 g Place 150 mLs (100 g) rectally every 2 hours as needed (Any time symptoms of HE(HE) are noted, RN to implement Lactulose Nurse Initiated Protocol. If symptoms of HE recur, RN will re-implement the protocol. RN to notify primary team each time HE Nurse Initiated Protocol is implemented.)    Linked Group 1:  \"Or\" Linked Group Details     levothyroxine (SYNTHROID/LEVOTHROID) tablet 88 mcg Take 1 tablet (88 mcg) by mouth every morning (before breakfast)    LORazepam (ATIVAN) injection 2 mg Inject 1 mL (2 mg) into the vein once    melatonin tablet 1 mg Take 1 tablet (1 mg) by mouth nightly as needed for sleep    miconazole (MICATIN; MICRO GUARD) 2 % powder Apply topically 2 times daily as needed for other (Apply to groin as needed)    mirtazapine (REMERON) tablet 45 mg Take 45 mg by mouth At Bedtime    naloxone (NARCAN) injection 0.1-0.4 mg Inject 0.25-1 mLs (0.1-0.4 mg) into the vein every 2 minutes as needed for opioid reversal    omeprazole (priLOSEC) CR capsule 40 mg Take 2 capsules (40 mg) by mouth 2 times " "daily (before meals)    ondansetron (ZOFRAN) injection 4 mg Inject 2 mLs (4 mg) into the vein every 6 hours as needed for nausea or vomiting    Linked Group 2:  \"Or\" Linked Group Details     ondansetron (ZOFRAN-ODT) ODT tab 4 mg Take 1 tablet (4 mg) by mouth every 6 hours as needed for nausea or vomiting    Linked Group 2:  \"Or\" Linked Group Details     phytonadione (MEPHYTON/VIT K) tablet 10 mg Take 2 tablets (10 mg) by mouth daily    pravastatin (PRAVACHOL) tablet 80 mg Take 2 tablets (80 mg) by mouth daily    rifaximin (XIFAXAN) tablet 550 mg Take 1 tablet (550 mg) by mouth 2 times daily    senna-docusate (SENOKOT-S;PERICOLACE) 8.6-50 MG per tablet 1 tablet Take 1 tablet by mouth 2 times daily    Linked Group 3:  \"Or\" Linked Group Details     senna-docusate (SENOKOT-S;PERICOLACE) 8.6-50 MG per tablet 2 tablet Take 2 tablets by mouth 2 times daily    Linked Group 3:  \"Or\" Linked Group Details     sodium chloride (PF) 0.9% PF flush 10-20 mL 10-20 mLs by Intracatheter route every hour as needed for line flush    vitamin B1 (THIAMINE) tablet 100 mg Take 1 tablet (100 mg) by mouth daily    vitamin D3 (CHOLECALCIFEROL) tablet 1,000 Units Take 1 tablet (1,000 Units) by mouth daily                Review of Systems:   Review of systems not obtained due to patient factors - mental status         Physical Exam:   BP 98/68 (BP Location: Left arm)  Pulse 63  Temp 97  F (36.1  C) (Oral)  Resp 18  Wt 71.9 kg (158 lb 9.6 oz)  SpO2 95%  BMI 22.76 kg/m2     General: NAD  HEENT: sclera anicteric  Resp: Breathing comfortably, no respiratory distress  Skin: warm and dry  Extremities: No edema    Neurologic:   Mental Status: Eyes open, looking around the room. Follows simple commands, unable to follow two-step commands.  Cranial Nerves: Eyes conjugate, EOMI, face symmetric  Motor: No abnormal movements. Strength antigravity throughout, 5/5 in b/l elbow flexors/extensors and handgrip  Reflexes: Increased on L patella compared to " right.   Sensory: Intact/symmetric to light touch throughout upper and lower extremities.   Coordination: No overt ataxia, unable to complete formal testing  Station/Gait: Deferred            Data:   CBC:  Lab Results   Component Value Date    WBC 1.4 11/29/2018     Lab Results   Component Value Date    HGB 9.9 11/29/2018     Lab Results   Component Value Date    HCT 30.4 11/29/2018     Lab Results   Component Value Date    PLT 33 11/29/2018       Last Basic Metabolic Panel:  Lab Results   Component Value Date     11/29/2018      Lab Results   Component Value Date    POTASSIUM 3.5 11/29/2018     Lab Results   Component Value Date    CHLORIDE 117 11/29/2018     Lab Results   Component Value Date    UCEH 8.5 11/29/2018     Lab Results   Component Value Date    CO2 19 11/29/2018     Lab Results   Component Value Date    BUN 30 11/29/2018     Lab Results   Component Value Date    CR 1.19 11/29/2018     Lab Results   Component Value Date     11/29/2018            Assessment and Recommendations:     # Hepatic encephalopathy:  Mono Varghese is a 48 year old male with seizure disorder, left temporal oligoastrocytoma s/p partial resection and chemoradiation, ETOH cirrhosis c/b ascites who presents with likely hepatic encephalopathy. VEEG has been hooked up for a couple of days and demonstrated BiPLEDs. He has undergone ativan challenge x2 without any clear improvement clinically or electrographically. These are thought to represent sequela of encephalopathy rather than true epileptiform discharges. We feel that given his hepatic dysfunction a change in his AED would be warranted. He is on carbamazepine currently but we would favor switching him over to keppra. He has been on this in the past and it was stopped for unclear reasons. We would recommend starting keppra now and slowly tapering off the carbamazepine.    Recommendations:  -Start keppra 750mg BID today  -In about one week start decreasing  carbamazepine, recommend decreasing by 50mg daily every week until this is stopped.    -In one week decrease to 200-150mg x7 days   -Then 150mg BID x7 days   -Then 150-100mg x7 days   -Then 100mg BID x7 days   -Then 100-50mg x7 days   -Then 50mg BID x7 days   -Then 50mg qAM x7 days   -Then stop  -VEEG stopped today  -Given family's concerns over increased right arm weakness it is reasonable to check an MRI brain without contrast    Patient seen and discussed with attending physician Dr. Verdin.    Vahe Mcgrath  Neurology PGY-3

## 2018-11-30 NOTE — PROGRESS NOTES
BRIEF NEUROLOGY NOTE:    Patient seen with staff on rounds. He has been improving from a mental status standpoint with treatment of hepatic encephalopathy. His exam remains stable with some improvement in his response time. MRI was completed yesterday, this was reviewed and looked stable from prior exam. Continue to recommend that he be switched from carbamazepine to levetiracetam with the same tapering scheduled as laid out in note on 11/29.    Recommendations:  -Levetiracetam 750mg BID  -Carbamazepine taper, starting in one week taper down 50mg daily every week until off    Neurology will sign off at this point. Do not hesitate to contact us with further concerns or questions.    Patient discussed with staff neurologist, Dr. Verdin.    Vahe Mcgrath MD  Neurology PGY-3

## 2018-11-30 NOTE — PROGRESS NOTES
[facility], [place]    Internal Medicine Progress Note - [facility] 3 Service    Main Plans for Today   - palliative care consult  - continue previous cares  - completed vit K challenge    Assessment & Plan   Mono Varghese is a 48 year old male with history of left parietal astrocytoma s/p resection, ESLD c/b refractory ascites requiring every 2 week genia (removing 8-12 L each time and receiving albumin), hepatic encephalopathy, rectal and esophageal varices and PVT (not currently on anticoagulation medication), and hypothyroidism, who presents with encephalopathy and generalized weakness.      # Encephalopathy  # Alcoholic cirrhosis c/b refractory ascites, MELD 19  Recently discharged on 11/8/18. Presenting with confusion and generalized weakness. Awake but not responsive. Ammonia 148. Exam notable for abdominal distention. Patient had been previously scheduled to undergo paracentesis today. Lactulose given in the ED. Asterixis on exam so likely HE. Asterixis improved. Diagnostic para without evidence of SBP. Ammonia decreased significantly in first days of admission. Mentation initially improved, then worsened.   - Lactulose scheduled TID  - Continue PTA Rifaximin  - Consider therapeuic paracentesis once patient clears and JOSEF resolves  - F/u blood cultures: NGTD  - Daily CMP and CBC, continue to monitor   - Fluids: 3-day albumin challenge (1g/kg/day), complete  - Vitamin K 10 mg x 3 days     # Generalized weakness  # History of seizures  # Parietal astrocytoma s/p partial resection, chemo, XRT in 2012 c/b seizures    Patient presents with AMS and generalized weakness. CT head on admission with no acute intracranial pathology. Stable postsurgical changes are seen, no changes compared to prior CT 9/14/18. Previously noted that due to neurological structural abnormalities, pt is more so sensitive to small metabolic changes, which may precipitate encephalopathy. DDx for Mr. Varghese  includes metabolic, including hepatic encephalopathy, seizure, infection, and medication. Ammonia elevated above baseline and asterixis on exam. Patient has been taking medications including carbamazepine as prescriped. Most likely AMS is due to hepatic encephalopathy. During prior hospitalization, CT angio head/neck and MRI brain w/ and w/o contrast negative. Weakness is likely a chronic problem; myopathy was previously considered but was thought to be less likely. vEEG with some concerning discharges, though clear seizure activity as of yet unconfirmed. S/p 2x ativan challenges without clear improvement, seizure activity less likely per neuro.   - MRI head w/out evidence of ischemic events, acute intracranial pathology, or other changes outside of previous postoperative findings  - Neurology consulted, appreciate recs   - Continue PTA carbamazepine (eventually taper off), add 750 mg BID keppra    - Carbamezapine taper:     - on 12/6/18:  200-150mg x 7 days     - on 12/13/18: 150mg BID x 7 days     - on 12/20/18: 150mg in  mg in PM x 7 days     - on 12/28/18: 100 mg BID x 7 days     - on 1/3/19: 100 mg in AM - 50 mg in PM x 7 days     - on 1/10/18: 50 mg BID x 7 days     - on 1/20/18: 50 mg qAM x 7 days     - stop       #JOSEF  Creatinine 1.62 on admission (baseline ~0.8). UA with not concerning for infection. Improved to 1.48 with fluids. Undergoing albumin challenge currently. Improving.  - Hold PTA spironolactone, lasix for now  - Albumin/LR bolus PRN  - Continue to trend Cr     # Malnutrition  - Continue PTA folic acid, methylphenidate, thiamine, D3  - Speech and Swallow consult           - If eventually cleared, place on high protein/KCal diet, AMS/HE limiting at this time     # Cough  Chronic non-productive cough, afebrile, CXR unremarkable on admission. Likely due to abdominal distension due to worsening ascites. Might have improvement after therapeutic paracentesis at come point.   - continue to monitor    - consider therapeutic para when JOSEF resolved and patient more stable     # Pancytopenia  Hg 12.3, WBC 3.9, Plt 41 on admission. Has worsened and perhaps found alisha. No source of active bleeding.  - Continue to trend     Diet: Regular Diet Adult Thin Liquids (water, ice chips, juice, milk, gelatin, ice cream, etc) (with 1:1, supervision)  Fluids: NPO  Lines: PIV, port   Munoz Catheter: not present    DVT Prophylaxis: Pneumatic Compression Devices  Code Status: DNR/DNI  Expected discharge: 4 - 7 days, recommended to prior living arrangement once mental status at baseline.    The patient's care was discussed with the Attending Physician, Dr. Graves.    Arpan Haley [facility] [place]  [facility] 3  Pager: [phone]  Please see sticky note for cross cover information    Interval History    Patient completely disoriented today on exam. A&O x 0. Worsened from prior. Able to deny pain on abdominal exam. Remainder of interview/ROS complicated by altered sensorium.     Physical Exam   Vital Signs: Temp: 97.2  F (36.2  C) Temp src: Oral BP: 92/59 Pulse: 65 Heart Rate: 62 Resp: 16 SpO2: 99 % O2 Device: None (Room air)    Weight: 158 lbs 8 oz  General Appearance: Somnolent, altered, disoriented, nonverbal   Respiratory: CTAB, comfortable on room air  Cardiovascular: RRR, no m/r/c/g  GI: distended abdomen with fluid wave, nontender to palpation, no organomegaly apprecaited  Skin: globally jaundiced  Other: global weakness      Data     Recent Labs  Lab 11/30/18  0542 11/29/18  0726 11/28/18  0602  11/26/18  1832   WBC 1.8* 1.4* 1.5*  < >  --    HGB 9.8* 9.9* 9.8*  < >  --    MCV 99 100 100  < >  --    PLT 34* 33* 32*  < >  --    INR 1.90* 1.74* 1.82*  --   --     143 142  < >  --    POTASSIUM 3.4 3.5 3.6  < >  --    CHLORIDE 113* 117* 112*  < >  --    CO2 20 19* 22  < >  --    BUN 26 30 30  < >  --    CR 1.16 1.19 1.39*  < >  --    ANIONGAP 8 8 8  < >  --    UCHE 8.4* 8.5 8.9  < >  --    GLC 95 103* 99  < >   --    ALBUMIN 4.1 3.9 3.7  < >  --    PROTTOTAL 6.4* 6.3* 6.3*  < >  --    BILITOTAL 1.4* 2.0* 2.4*  < >  --    ALKPHOS 220* 200* 207*  < >  --    ALT 36 28 27  < >  --    AST 49* 35 35  < >  --    TROPONIN  --   --   --   --  0.01   < > = values in this interval not displayed.    Recent Results (from the past 24 hour(s))   MR Brain w/o Contrast    Narrative    MR BRAIN W/O CONTRAST 11/29/2018 9:17 PM    Provided History: Increased RUE weakness;     Comparison: Brain MRI dated 9/16/2018     Technique: Sagittal T1-weighted and axial T2-weighted, turboFLAIR and  diffusion-weighted with ADC map images of the brain were obtained  without intravenous contrast.    Findings: Multiple images degraded by motion artifact. Postoperative  changes of left parietal craniotomy with underlying resection cavity,  unchanged. Stable confluent periventricular T2 hyperintensity within  the posterior left frontal lobe and the right periatrial white matter  and scattered cerebral punctated microhemorrhage. There is no new  intracranial mass lesion, mass effect, midline shift or abnormal  extraaxial fluid collection. Diffusion-weighted images demonstrate no  abnormality of reduced diffusion.  Normal intravascular flow voids are  identified.      Impression    Impression:   1.  No acute intracranial pathology.  2.  Stable postoperative changes of left parietal craniotomy with  underlying resection cavity.  3.  Unchanged leukoaraiosis.    I have personally reviewed the examination and initial interpretation  and I agree with the findings.    SALO YU MD     Medications     - MEDICATION INSTRUCTIONS -         calcium carbonate-vitamin D  1 tablet Oral Daily     carBAMazepine  200 mg Oral BID     vitamin B-12  1,000 mcg Oral Daily     ferrous sulfate  325 mg Oral BID     gabapentin  100 mg Oral At Bedtime     lactulose  60 mL Oral BID     levETIRAcetam  750 mg Oral BID     levothyroxine  88 mcg Oral QAM AC     mirtazapine  45 mg Oral At  Bedtime     omeprazole  40 mg Oral BID AC     pravastatin  80 mg Oral Daily     rifaximin  550 mg Oral BID     senna-docusate  1 tablet Oral BID    Or     senna-docusate  2 tablet Oral BID     vitamin B1  100 mg Oral Daily     vitamin D3  1,000 Units Oral Daily

## 2018-11-30 NOTE — PLAN OF CARE
Problem: Patient Care Overview  Goal: Plan of Care/Patient Progress Review  ST session cancelled. Attempted to see pt for dysphagia tx, however pt declined participation despite encouragement. Will follow up as appropriate.

## 2018-11-30 NOTE — PLAN OF CARE
Problem: Patient Care Overview  Goal: Plan of Care/Patient Progress Review  Outcome: Improving  Pt alert and answers yes/no appropriately. Oriented to person and place. Pt with VPM. VSS. Bed rest and incontinent of stool w/ one BM and urine. Port a cath saline locked. Platelet level of 34. Pt denies pain, nausea, numbness or tingling. Continue with plan of care.

## 2018-11-30 NOTE — PLAN OF CARE
Problem: Patient Care Overview  Goal: Plan of Care/Patient Progress Review  Outcome: No Change  Status: Pt orientation fluctuates, however disoriented x4 this shift. VSS, breathing adequately on RA.    GI: Loose greenish/yellow stool x1. Incontinent. Regular diet, good appetite.   : Urine incontinence, adequate urine output. Condom catheter on for urine sample.   IV: Port-a-cath in right chest, site WDL. New dressing applied today as old one had fallen off. Saline locked.   Activity: Assist of 1-2, remained in bed all shift.   Pain: No c/o pain.  Skin: WDL, barrier cream applied to slightly reddened bottom.   Plan of care: On seizure precautions. Continue to monitor and follow POC.

## 2018-11-30 NOTE — PLAN OF CARE
Problem: Patient Care Overview  Goal: Plan of Care/Patient Progress Review  Outcome: Improving  Pt VSS on RA. A&Ox2. Disoriented to time and situation. Denied pain. Pt following more commands today. Able to assist with turning. Answering some questions. Pt stood at side of bed for weight with assist of 2. Able to feed self with tray set up. Good appetite noted, eating 75% of sandwich. Pt went for brain MRI this evening. Update given to wife- Yisel. Continues on VPM due to previously pulling out port needle. 2 watery stools noted this shift. Incontinent of urine, unable to collect urine sample.    Plan: continue to monitor mentation. Encourage activity and strength building as tolerated.

## 2018-11-30 NOTE — CONSULTS
[facility], [place]    Palliative Care Consultation Note    Patient: Mono Varghese  Date of Admission:  11/26/2018    Requesting provider/team: [facility]  Reason for consult: Goals of care  Patient and family support    Recommendations: Patient seen and examined.  Family not present.  He is a limited historian due to underlying encephalopathy.  Able to participate and cooperate in elements of exam.  -Palliative care will continue to follow.  We will try to connect with patient's spouse early part of next week to discuss in more detail their goals of care in the setting of recurrent hospitalizations and underlying encephalopathy.  -We will ask palliative counseling to see pending outcome of discussion with the patient's spouse.  -Agree with ongoing paracentesis for symptom management.  -Patient unable to articulate spiritual needs.  We will have palliative  follow.    These recommendations have been discussed with primary team    Thank you for the opportunity to participate in the care of this patient and family. Our team will follow. Please feel free to contact the on-call Palliative provider with any urgent needs.     Kwesi Miller NP  Pager: [phone]  [facility] Inpatient Team Consult pager [phone] (M-F 8-4:30)  After-hours Answering Service [phone]   Palliative Clinic: [phone]       Assessment:  Mono Varghese is a 48 year old male with history of left parietal astrocytoma s/p resection- (2013), ESLD c/b refractory ascites requiring every 2 week paracentesis (removing 8-12 L each time- receiving albumin), hepatic encephalopathy- on lactulose therapy, rectal and esophageal varices and PVT (not currently on anticoagulation medication), who presented again to hospital on 11/26/2018 with confusion, encephalopathy and generalized weakness.    Note prior hospitalizations with admission dates of 11/1/2018 and 10/15/2018.        Social: Lives with wife  Yisel. Denies falls at home. Uses walker. Recalls frequent hospitalization   Functional status: Was independent but with intermittent AMS due to HE   Substance use disorder (past / present):ETOH   Occupation: - liked his work   Coping: Family        Prognostic Information: Patient unable to participate in conversation regarding his understanding of his prognosis and expectations of his care.    History of Present Illness   Sources of History:patient and electronic health record.  See assessment above.      ROS:    Palliative Symptom Review (0=no symptom/no concern, 1=mild, 2=moderate, 3=severe):    Pain: Minimal -- by his report.     unable to reliably complete ROS due to altered mental status secondary to HE         Past Medical History:   Past Medical History:   Diagnosis Date     ADHD      Alcoholic cirrhosis of liver with ascites (H) 06/17/2015    cirrhosis secondary to alcohol, complicated by variceal bleeding and hepatic encephalopathy      Ascites due to alcoholic cirrhosis (H)     Requiring regular paracentesis.     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 status post subtotal resection by Dr. J Carlos Aranda in early 06/2013. Negative for 1p/19q deletions. S/P Concurrent chemoradiation July 15 through August  23, 2013. Initiated adjuvant oral temozolomide 9/17/13; switch     H/O LENA (obstructive sleep apnea)-moderate 12/18/2012    Pt states this is not currently an issue.     Hyperlipidemia LDL goal <100 8/1/2016     Hypothyroidism 8/1/2016     Liver failure (H)      Moderate major depression (H) 10/3/2012     Obesity      Pancytopenia (H) 8/1/2016    Chronic pancytopenia secondary to liver disease since at least 2012      Partial epilepsy with impairment of consciousness (H) 05/07/2014     Portal hypertensive gastropathy (H)      Portal vein thrombosis 12/18/2015    history of left lower extremity deep vein thrombosis as well as portal vein and superior mesenteric vein thrombosis, late 2015 when he was hospitalized in the ICU and seriously ill temporary IVC filter placed in 12/2015 when he was in the ICU with deep vein thromboses and active bleeding      Pulmonary nodules 6/17/2015     Rectal bleeding      Seizures (H)      Type 2 diabetes mellitus (H) 10/3/2012          Past Surgical History:   Past Surgical History:   Procedure Laterality Date     ABDOMINAL PARACENTESIS  11/27/2018          COLONOSCOPY N/A 11/27/2015    Procedure: COMBINED COLONOSCOPY, SINGLE OR MULTIPLE BIOPSY/POLYPECTOMY BY BIOPSY;  Surgeon: Ran Thurston MD;  Location: [facility]     ENDOSCOPIC ULTRASOUND LOWER GASTROINTESTIONAL TRACT (GI) N/A 10/15/2018    Procedure: Rectal Endoscopic Ultrasound **Latex Allergy** with coiling and glueing of rectal varices;  Surgeon: Guru Jose Kelly MD;  Location: [facility]     ENDOSCOPIC ULTRASOUND UPPER GASTROINTESTINAL TRACT (GI) N/A 10/1/2018    Procedure: ENDOSCOPIC ULTRASOUND, ESOPHAGOSCOPY / UPPER GASTROINTESTINAL TRACT (GI);;  Surgeon: Guru Jose Kelly MD;  Location: [facility]     ESOPHAGOSCOPY, GASTROSCOPY, DUODENOSCOPY (EGD), COMBINED N/A 11/23/2015    Procedure: COMBINED ESOPHAGOSCOPY, GASTROSCOPY, DUODENOSCOPY (EGD);  Surgeon: Rocael Rizvi MD;  Location: [facility]      ESOPHAGOSCOPY, GASTROSCOPY, DUODENOSCOPY (EGD), COMBINED N/A 1/25/2017    Procedure: COMBINED ESOPHAGOSCOPY, GASTROSCOPY, DUODENOSCOPY (EGD), BIOPSY SINGLE OR MULTIPLE;  Surgeon: Rocael Tejada MD;  Location: [facility]     ESOPHAGOSCOPY, GASTROSCOPY, DUODENOSCOPY (EGD), COMBINED N/A 3/30/2017    Procedure: COMBINED ESOPHAGOSCOPY, GASTROSCOPY, DUODENOSCOPY (EGD);  Surgeon: Jordana Ramirez MD;  Location: [facility]     ESOPHAGOSCOPY, GASTROSCOPY, DUODENOSCOPY (EGD), COMBINED N/A 1/19/2018    Procedure: COMBINED ESOPHAGOSCOPY, GASTROSCOPY, DUODENOSCOPY (EGD);  Upper Endoscopy with verceal banding; Flexible Sigmoidoscopy;  Surgeon: Guru Jose Kelly MD;  Location: [facility]     ESOPHAGOSCOPY, GASTROSCOPY, DUODENOSCOPY (EGD), COMBINED N/A 2/12/2018    Procedure: COMBINED ESOPHAGOSCOPY, GASTROSCOPY, DUODENOSCOPY (EGD);  Esophagogastroduodenoscopy with variceal banding;  Surgeon: Guru Jose Kelly MD;  Location: [facility]     ESOPHAGOSCOPY, GASTROSCOPY, DUODENOSCOPY (EGD), COMBINED N/A 3/5/2018    Procedure: COMBINED ESOPHAGOSCOPY, GASTROSCOPY, DUODENOSCOPY (EGD);  Esophagogastroduodenoscopy  with banding of varices Latex Allergy ;  Surgeon: Guru Jose Kelly MD;  Location: [facility]     ESOPHAGOSCOPY, GASTROSCOPY, DUODENOSCOPY (EGD), COMBINED N/A 4/16/2018    Procedure: COMBINED ESOPHAGOSCOPY, GASTROSCOPY, DUODENOSCOPY (EGD);  Upper Endoscopy  with esophageal varices banding; Latex Allergy ;  Surgeon: Guru Jose Kelly MD;  Location: [facility]     ESOPHAGOSCOPY, GASTROSCOPY, DUODENOSCOPY (EGD), COMBINED N/A 5/30/2018    Procedure: COMBINED ESOPHAGOSCOPY, GASTROSCOPY, DUODENOSCOPY (EGD);  upper endoscopy with banding of esophageal varices. *latex Allergy*;  Surgeon: Guru Jose Kelly MD;  Location: [facility]     OPTICAL TRACKING SYSTEM CRANIOTOMY, EXCISE TUMOR, COMBINED  6/6/2013    Procedure: COMBINED OPTICAL TRACKING SYSTEM CRANIOTOMY,  EXCISE TUMOR;  Left Stealth Guided Craniotomy , Tumor Resection ;  Surgeon: J Carlos Aranda MD;  Location: [facility]     ORTHOPEDIC SURGERY      hand surgery- right     SIGMOIDOSCOPY FLEXIBLE N/A 11/24/2015    Procedure: SIGMOIDOSCOPY FLEXIBLE;  Surgeon: Rocael Rizvi MD;  Location: [facility]     SIGMOIDOSCOPY FLEXIBLE N/A 1/19/2018    Procedure: SIGMOIDOSCOPY FLEXIBLE;;  Surgeon: Guru Jose Kelly MD;  [facility]: [facility]     SIGMOIDOSCOPY FLEXIBLE N/A 10/1/2018    Procedure: SIGMOIDOSCOPY FLEXIBLE;;  Surgeon: Guru Jose Kelly MD;  Location: [facility]             Family History:   Family History   Problem Relation Age of Onset     Adopted: Yes     Medical History Unknown Mother      Cirrhosis Father      Medical History Unknown Brother      Medical History Unknown Brother      Medical History Unknown Brother      Family history reviewed       Allergies:   Allergies   Allergen Reactions     Latex Itching and Rash     No Clinical Screening - See Comments      Coban and Surgilast cause itching     Tegaderm Transparent Dressing (Informational Only)             Medications:   I have reviewed this patient's medication profile and medications given in the past 24 hours.             Physical Exam:   Vital Signs: Temp: 96  F (35.6  C) Temp src: Oral BP: 95/55 Pulse: 65 Heart Rate: 59 Resp: 18 SpO2: 100 % O2 Device: None (Room air)    Weight: 159 lbs 1.6 oz    Physical Exam:  General appearance: Somewhat lethargic, oriented to self and hospital as well as family but vague about details of his condition.  HEENT: Symmetric features, EOMI, sclera mildly icteric.  Mucous membranes moist tongue midline.  Respiratory: Clear to auscultation bilaterally.  Comfortable on room air.  No wheeze or rales.    Cardiovascular: S1-S2 without murmur.  RRR, rate in the 80s at rest.  Abdomen: Distended with positive fluid wave.  Diffusely tender.  No focal findings other than ascites.  Skin: Jaundice, no evidence  for open areas.  Port right chest is accessed and site appears clean dry and intact.  Other: Generalized weakness.  No resting tremor identified.  Slow to respond to questions and sometimes disorganized thought patterns.       Data reviewed:      ROUTINE LABS (Last four results)  BMP  Recent Labs  Lab 11/30/18  0542 11/29/18  0726 11/28/18  0602 11/27/18 2059    143 142 139   POTASSIUM 3.4 3.5 3.6 3.6   CHLORIDE 113* 117* 112* 110*   UCHE 8.4* 8.5 8.9 8.8   CO2 20 19* 22 21   BUN 26 30 30 30   CR 1.16 1.19 1.39* 1.38*   GLC 95 103* 99 91     CBC  Recent Labs  Lab 11/30/18  0542 11/29/18  0726 11/28/18  0602 11/27/18  0604   WBC 1.8* 1.4* 1.5* 2.8*   RBC 2.96* 3.03* 3.04* 3.47*   HGB 9.8* 9.9* 9.8* 11.3*   HCT 29.2* 30.4* 30.3* 34.0*   MCV 99 100 100 98   MCH 33.1* 32.7 32.2 32.6   MCHC 33.6 32.6 32.3 33.2   RDW 14.6 15.2* 15.2* 15.2*   PLT 34* 33* 32* 35*     INR  Recent Labs  Lab 11/30/18  0542 11/29/18  0726 11/28/18  0602 11/26/18  1756   INR 1.90* 1.74* 1.82* 1.54*           Kwesi Miller NP  Pager: *[phone]  [facility] Inpatient Team Consult pager [phone] (M-F 8-4:30)  After-hours Answering Service [phone]  Palliative Clinic: [phone]     Total time spent was 40 minutes,  >50% of time was spent counseling and/or coordination of care.   22 min of that were spent face-to-face.

## 2018-12-01 NOTE — PLAN OF CARE
Problem: Patient Care Overview  Goal: Plan of Care/Patient Progress Review  AVSS. Pt alert. Slow to respond. Oriented to self. Up in chair most of day. Ambulating with assist of 2 and gb. Incontinent of urine and stool. BM x 2 this shift. Brief on. Eating 50% of meals. Denies pain/nausea. VPM on. Port hep locked. Wife at bedside. Possible discharge tomorrow. Continue with poc.

## 2018-12-01 NOTE — PROGRESS NOTES
[facility], [place]    Internal Medicine Progress Note - Piedad 3 Service    Main Plans for Today   - Up to chair as able  - Continue lactulose  - Likely discharge tomorrow if continues to clear    Assessment & Plan   Mono Varghese is a 48 year old male with history of left parietal astrocytoma s/p resection, ESLD c/b refractory ascites requiring every 2 week genia (removing 8-12 L each time and receiving albumin), hepatic encephalopathy, rectal and esophageal varices and PVT (not currently on anticoagulation medication), and hypothyroidism, who presents with encephalopathy and generalized weakness.      Hepatic Encephalopathy  Alcoholic cirrhosis c/b refractory ascites, MELD 19  Recently discharged on 11/8/18.  Ammonia very elevated this admission.  Cause of decline unclear as no evidence for infection, decreased lactulose compliance, etc.  - Lactulose, rifaximin  - Consider therapeuic paracentesis prior to discharge  - Albumin, vitamin K challenges completed     Generalized weakness  History of seizures  Parietal astrocytoma s/p partial resection, chemo, XRT in 2012 c/b seizures    Seen by neuro earlier this stay.  Some question of NCSE by initial EEG, but neuro felt this is less likely.  Planning to taper off carbamazepine after on keppra for ~1 week.  - MRI head unchanged from prior  - Neurology consulted, appreciate recs; see their note 11/29 for taper plan    JOSEF, improving, prerenal  Creatinine 1.62 on admission (baseline ~0.8). UA with not concerning for infection.  - Hold PTA spironolactone, lasix for now; likely resume at discharge  - Albumin/LR bolus PRN  - Continue to trend Cr     Severe Malnutrition  - Continue PTA folic acid, methylphenidate, thiamine, D3  - Patient/family have been very clear that would not want feeding tube.  Continue to encourage oral intake.    Cough  Chronic non-productive cough, afebrile, CXR unremarkable on admission. Likely due to abdominal  distension due to worsening ascites. Might have improvement after therapeutic paracentesis at come point.   - continue to monitor   - consider therapeutic para when JOSEF resolved and patient more stable     Pancytopenia  Hg 12.3, WBC 3.9, Plt 41 on admission. Has worsened and perhaps found alisha. No source of active bleeding.  Noted in heme/onc outpatient notes for past several years, but no clear source found.  - Continue to trend; consider outpatient follow up    Goals of Care  Patient & wife very clear about not wanting any extra tubes/lines/etc.  Very interested in meeting with palliative.  Meeting planned for 9am 12/2.  Would benefit from outpatient follow up to determine long term plans regarding repeat hospitalizations/etc.    Diet: Regular  Fluids: PO  Lines: PIV, port   Munoz Catheter: not present    DVT Prophylaxis: SCDs given thrombocytopenia  Code Status: DNR/DNI  Expected discharge: Tomorrow, recommended to prior living arrangement once mental status at baseline.    The patient's care was discussed with the Attending Physician, Dr. Graves.    Teofilo Hamm  [facility] 3  Please see sticky note for cross cover information    Interval History    NAEO.  More alert today.  Denies pain or dyspnea.  Able to answer in short 1-2 word phrases.  Wife, Yisel, arrived later in day and also updated at bedside.  Reports Mono appears to be very close to his baseline and likely ready for discharge tomorrow.  Very interested in meeting with palliative; spoke to on call provider, and he will plan to meet with them tomorrow at 9am.    Physical Exam   Vital Signs: Temp: 97.4  F (36.3  C) Temp src: Axillary BP: 92/54 Pulse: 58 Heart Rate: 59 Resp: 16 SpO2: 97 % O2 Device: None (Room air)    Weight: 159 lbs 1.6 oz  General Appearance: More alert today, able to answer in short phrases  Respiratory: CTAB, comfortable on room air, nwob  Cardiovascular: RRR, no m/r/g  GI: mildly distended,  non-tender  Skin: jaundiced  Neuro: Follows simple commands today, oriented to self/location but not date or events.  Able to move all extremities to command, though remains weaker on R.  Wife reports appears to be at recent baseline.    Labs/Meds/Imaging: Reviewed in Epic

## 2018-12-01 NOTE — PLAN OF CARE
Problem: Patient Care Overview  Goal: Plan of Care/Patient Progress Review  Discharge Planner SLP   Patient plan for discharge: Unknown   Current status: Pt remains appropriate for regular textures and thin liquids when alert and upright.  Pt will need to take small bites/sips, pace self, and alternate consistencies to increase safe intake.  Anticipate need for reminders for consistent implementation of precautions and strategies.    Barriers to return to prior living situation: Below baseline   Recommendations for discharge: Rehab   Rationale for recommendations: Anticipate ongoing needs       Entered by: Helene Shaver 12/01/2018 8:54 AM

## 2018-12-02 NOTE — PLAN OF CARE
Problem: Patient Care Overview  Goal: Plan of Care/Patient Progress Review  Outcome: No Change  Pt lethargic and oriented only to self, VSS on RA. Incontinent of bowel and bladder, BM x3 throughout shift. Pt interactions are very slow and deliberate. Gave lactulose x2 overnight to complete the protocol requested per MDs on the evening shift. Up with a very heavy assist of 2, not OOB during this shift. Continue POC.

## 2018-12-02 NOTE — PLAN OF CARE
Problem: Patient Care Overview  Goal: Plan of Care/Patient Progress Review  PT 5B: Cancel - PT orders acknowledged and appreciated. Pt with palliative care meeting this morning, pt very lethargic and minimally engaged. Pt with continued medical workup and goals of care. Will assess and initiate as indicated pending medical plan and anticipated recovery/goals.

## 2018-12-02 NOTE — PROVIDER NOTIFICATION
Pt more confused this evening. Oriented only to self. Very slow to respond. Slurred speech. Follows commands but very slowly and after repeated encouragement. Only gives short responses. Took lactulose with much encouragement. HE protocol initiated. Provider updated (      MARI [id number]). Continue to monitor orientation. Administer additional lactulose doses per HE protocol.

## 2018-12-02 NOTE — PROGRESS NOTES
[facility], [place]    Internal Medicine Progress Note - Piedad 3 Service    Main Plans for Today   - Up to chair as able  - Continue lactulose  - Palliative care visit this AM, possibly defer/repeat until mentation cleared  - Resume laxis/aldactone     Assessment & Plan   Mono Varghese is a 48 year old male with history of left parietal astrocytoma s/p resection, ESLD c/b refractory ascites requiring every 2 week genia (removing 8-12 L each time and receiving albumin), hepatic encephalopathy, rectal and esophageal varices and PVT (not currently on anticoagulation medication), and hypothyroidism, who presents with encephalopathy and generalized weakness.      Hepatic Encephalopathy  Alcoholic cirrhosis c/b refractory ascites, MELD 19  Recently discharged on 11/8/18.  Ammonia very elevated this admission.  Cause of decline unclear as no evidence for infection, decreased lactulose compliance, etc.  - Lactulose, rifaximin  - Consider therapeuic paracentesis prior to discharge if patient symptomatic   - Albumin, vitamin K challenges completed     Generalized weakness  History of seizures  Parietal astrocytoma s/p partial resection, chemo, XRT in 2012 c/b seizures    Seen by neuro earlier this stay.  Some question of NCSE by initial EEG, but neuro felt this is less likely.  Planning to taper off carbamazepine after on keppra for ~1 week.  - MRI head unchanged from prior  - Neurology consulted, appreciate recs; see their note 11/29 for taper plan    JOSEF, improving, prerenal  Creatinine 1.62 on admission (baseline ~0.8). UA with not concerning for infection.  - Resume lasix/aldactone   - Albumin/LR bolus PRN  - Continue to trend Cr     Severe Malnutrition  - Continue PTA folic acid, methylphenidate, thiamine, D3  - Patient/family have been very clear that would not want feeding tube.  Continue to encourage oral intake.    Cough  Chronic non-productive cough, afebrile, CXR unremarkable on  admission. Likely due to abdominal distension due to worsening ascites. Might have improvement after therapeutic paracentesis at come point.   - continue to monitor   - consider therapeutic para when JOSEF resolved and patient more stable if contributing      Pancytopenia  Hg 12.3, WBC 3.9, Plt 41 on admission. Has worsened and perhaps found alisha. No source of active bleeding.  Noted in heme/onc outpatient notes for past several years, but no clear source found.  - Continue to trend; consider outpatient follow up    Goals of Care  Patient & wife very clear about not wanting any extra tubes/lines/etc.  Very interested in meeting with palliative. Mono was not able to participate with palliative due to being altered on 12/2. Palliative will revisit with him and his wife (Yisel).      Diet: Regular  Fluids: PO  Lines: PIV, port   Munoz Catheter: not present    DVT Prophylaxis: SCDs given thrombocytopenia  Code Status: DNR/DNI  Expected discharge: Tomorrow, recommended to prior living arrangement once mental status at baseline.    The patient's care was discussed with the Attending Physician, Dr. Graves.    [facility]  [facility] 3  Please see sticky note for cross cover information    Interval History    NAEO.  Less alert today. Difficult to arouse, though is able to deny pain. Palliative to meet with Mono and wife Yisel at 9 am today.      ROS complicated by somnolence, though able to deny pain and shortness of breath.     Physical Exam   Vital Signs: Temp: 96.9  F (36.1  C) Temp src: Oral BP: 116/61 Pulse: 57 Heart Rate: 62 Resp: 18 SpO2: 97 % O2 Device: None (Room air)    Weight: 163 lbs 0 oz  General Appearance: Altered today, AxO x 1, though just woken from sleep  Respiratory: CTAB, comfortable on room air, nwob  Cardiovascular: RRR, no m/r/g  GI: mildly distended, non-tender  Skin: jaundiced  Neuro: Follows simple commands today, only oriented to self.  Only able to  follow simple commands with movements of upper extremities.     Labs/Meds/Imaging: Reviewed in Epic

## 2018-12-02 NOTE — PLAN OF CARE
Problem: Patient Care Overview  Goal: Plan of Care/Patient Progress Review  Outcome: Declining  Pt confused, lethargic. Oriented only to self. Very slow to respond. Slurred speech. Follows commands but very slowly and with repeated encouragement. Up to chair and back to bed with assist x2. Pt needing many verbal cues. Incontinent urine and stool x2 this evening. Additional lactulose administered per HE protocol. Needs 2 more doses at 00 and 02. VPM for safety. Continue to monitor orientation and provide for pt safety. HE protocol.

## 2018-12-02 NOTE — PLAN OF CARE
Problem: Patient Care Overview  Goal: Plan of Care/Patient Progress Review  Outcome: Improving  Shift:   VS: Temp: 97.4  F (36.3  C) Temp src: Oral BP: 130/75 Pulse: 58 Heart Rate: 62 Resp: 16 SpO2: 97 % O2 Device: None (Room air)    Pain: Denies pain/nausea  Neuro: A&Ox4, pt is more alert this morning, able to answer all orientation questions  Cardiac:  Intermittent bradycardia  Respiratory: WNL. Denies SOB  GI/Diet/Appetite: Poor oral intake, wife assisting at bedside. Large loose BM x2  :  Incontinence of urine  LDA's: Chest port Hep locked  Skin: No new deficit noted  Activity: Turned and weight shifted q2hrs with assist x2  Tests/Procedures:   Pertinent Labs/Lab Collection:      Plan: Patient is seen by palliative care this morning, will follow up tomorrow again. Continue with cares and update MD with any changes.

## 2018-12-02 NOTE — PROGRESS NOTES
[facility]  Palliative Care Daily Progress Note       Recommendations & Counseling     --We will try again to have a goals of care discussion with patient and wife tomorrow - hopefully he'll be more alert.   --We can [place] in our [place] [facility] also in the coming weeks to try to have a goals discussion then.  --Notably he does qualify for hospice care if that's what he wants, ie he'd be cared for with a comfort focus at home the next time he decompensates. As below, it's unclear what he wants.     Comment: Mono became more lethargic last night (wife attributes to his T elevation of 99) and while he wakes up this morning he has asterixis, and obvious difficulty following any conversation. His wife believes that when he is at his baseline he is alert and thinking normally, however with slow somewhat dysarthric speech. I do speak with his wife at length about the situation. She views his global condition as declining, and assumes he will die soon. He has had escalating admissions for encephalpathy really without clear trigger much of the time and she isn't sure what more to do. Neuro saw him here to eval for NCSE but didn't really think that was causing these episodes. He gives her mixed messages about goals: has been very clear for a long time about wanting a DNR/DNI order and no G tube ever. At times he says he is ready to die and refuses to be evaluated when he's home and getting worse, at other times he says he is not dying and will live a long time. It's pretty unclear to me what his encephalopathy status is when making such comments. She also reports no support from Mono's family, and feels under a lot of pressure from them to keep him healthy, and feels blamed by them that he's not. Apparently they are under pressure to move him to a NH but she feels strongly that Mono wants to be at home, and to die at home, and  A NH would be worse for him. She receives  psychotherapy from a palliative LICSW at  ([facility]).       Thank you for involving us in the patient's care. 45' total time over half counseling  Oscar Napoles MD / Palliative Medicine / Pager [phone] / [facility] Inpatient Team Consult Pager [phone] (answered 8am-430pm M-F) - ok to text page via [employer] / After-Hours Answering Service [phone] / Palliative Clinic in the [facility] [place] [phone] (scheduling); [phone] (triage).            Assessment      Diagnoses & symptoms:          Decompensated ESLD with ascites, PVT, varices, and recurrent epsiodes of HE without clear triggering factors    S/p parietal resection for an anaplastic astrocytoma - no evidence of active disease for a few years    Mood/coping/meaning: as above              Interval History:   More lethargic overnight    He arouses a bit and answers some simple Qs    Met in room with wife and discussed extensively as above.            Review of Systems:   Denies pain            Medications:   I have reviewed this patient's medication profile and medications during this hospitalization  Noted meds:  reviewed           Physical Exam:   Vitals were reviewed  Temp: 96.9  F (36.1  C) Temp src: Oral BP: 116/61 Pulse: 57 Heart Rate: 62 Resp: 18 SpO2: 97 % O2 Device: None (Room air)    Lethargic chronically ill appearing NAD  +asterixis with LUE; R he has trouble keeping up against gravity presumably from his parietal resection  + edema  Resps unlabored    Can spell his name, but can't even begin to spell it backwards             Data Reviewed:   Reviewed recent pertinent imaging, comments:  Reviewed recent labs, comments: MELD 16

## 2018-12-02 NOTE — PLAN OF CARE
Problem: Patient Care Overview  Goal: Plan of Care/Patient Progress Review  OT/5B: Cancel - OT orders acknowledged and appreciated. Patient lethargic this AM with palliative care meeting. Will assess and initiated as indicated pending further medical plan/care plan goals.

## 2018-12-03 NOTE — PROCEDURES
Procedure Date: 11/29/2018      24-HOUR VIDEO ELECTROENCEPHALOGRAPHY       EEG NUMBER:  -3       DATE OF RECORDING/SERVICE DATE:  11/29/2018       SOURCE FILE DURATION:  10 hours, 18 minutes, 53 seconds.       This is day 3 of 24-hour video EEG      CLINICAL SUMMARY:  The patient is a 48-year-old male with history of left parietal astrocytoma status post resection, end-stage liver disease, refractory ascites, hepatic encephalopathy who presented with encephalopathy and generalized weakness.  EEG was performed to evaluate for seizures.      MEDICATIONS:  The patient is reportedly receiving gabapentin, carbamazepine and Remeron.     TECHNICAL SUMMARY: This continuous video- EEG monitoring procedure was performed with 23 scalp electrodes in 10-20 electrode system placement, and additional scalp, precordial and other surface electrodes used for electrical referencing and artifact detection.  Video monitoring was utilized and periodically reviewed by EEG technologists and the physician for electroclinical correlations.     INTERICTAL ACTIVITY:  No well-organized posterior dominant rhythm was appreciated.  Diffuse polymorphic 2-6 Hz theta-delta slowing was present throughout the recording.  There was an asymmetry with increased amplitude over the left temporoparietal occipital region.  Rare sharp waves were present over the left posterior region at P5, P3.  At times, these came in quasi periodic pattern at 1-2 Hz.  There were periods with decreased myogenic activity and the patient was behaviorally asleep, but there were no vertex waves or sleep spindles.  The patient was stimulated at approximately 8:44.  He was able to count from 1-20, was able to follow commands, but was not oriented to time.  There were increased faster frequency activities during this time.      CLINICAL/ICTAL EVENTS:  No electrographic or clinical seizures were recorded.      IMPRESSION:  This is an abnormal video EEG due to the presence of  moderate diffuse nonspecific encephalopathy with an initial neuronal structural abnormality over the left posterior region.  There were also epileptiform discharges over the left posterior temporoparietal region consistent with an area of irritability and tendency for seizures.  No electrographic or clinical seizures were recorded on this date of monitoring.      SUMMARY OF 3 DAYS OF VIDEO EEG MONITORING:  Video EEG was abnormal throughout.  Theta/delta slowing was present throughout the recording consistent with moderate diffuse nonspecific encephalopathy. There was also a breach rhythm over the left posterior region. On day 1 of the monitoring, there were periodic sharp waves present over the left temporoparietal region at 1 Hz, which seemed to improve with lorazepam challenge, however, there was no clinical improvement in the patient's cognition. Epileptiform discharges decreased on day 2 and 3 of the monitoring and periodic discharges abated.  Lorazepam challenge was performed for continuous rhythmic left posterior rhythmic delta activities and the patient's continued encephalopathy; however, there were no clinical or electrographic changes.          NIKOLE TAM MD             D: 2018   T: 2018   MT: JASON      Name:     SALO MADERA   MRN:      [id number]        Account:        [id number]   :      1970           Procedure Date: 2018      Document: [id number]

## 2018-12-03 NOTE — PLAN OF CARE
Problem: Patient Care Overview  Goal: Plan of Care/Patient Progress Review  Discharge Planner PT 5A  Patient plan for discharge: Home  Current status: Pt rolled in bed with Mod A x 2. Pt tx sidelying->sit EOB with Max A x 2. Pt tx sit->stand with Mod A x 2. Pt took 3 steps to bedside chair with Mod A x 2, knees buckling.   Barriers to return to prior living situation: 17 stairs from main entrance at garage to bedroom and bathroom level, pt currently requires A x 2 for all mobility and during day at home only has A x 1, weakness, fatigue  Recommendations for discharge: TCU  Rationale for recommendations: Pt currently requires assistance x 2 for all mobility, and only has assistance of his mother in law at home from 9am-7pm, pt would benefit from additional skilled PT to maximize IND, increase strength and reduce caregiver burden.       Entered by: Anisa Jacobs 12/03/2018 4:25 PM

## 2018-12-03 NOTE — PROVIDER NOTIFICATION
Notified marcarole cross cover [phone] of signed/held pre-op orders for EGD tomorrow with no context or mention in recent team notes. Provider agreed to review orders and consult with GI team in AM to change or delete as needed. Will continue to follow up as warranted.

## 2018-12-03 NOTE — PROGRESS NOTES
[facility], [place]    Internal Medicine Progress Note - Piedad 3 Service    Main Plans for Today   - Up to chair as able  - Palliative care visit  - Hold diuretics with Cr bump    Assessment & Plan   Mono Varghese is a 48 year old male with history of left parietal astrocytoma s/p resection, ESLD c/b refractory ascites requiring every 2 week genia (removing 8-12 L each time and receiving albumin), hepatic encephalopathy, rectal and esophageal varices and PVT (not currently on anticoagulation medication), and hypothyroidism, who presents with encephalopathy and generalized weakness.      Goals of Care  Patient & wife very clear about not wanting any extra tubes/lines/etc.  Very interested in meeting with palliative. Mono was not able to participate with palliative due to being altered on 12/2. Palliative will revisit almost daily, will need to visit with wife Yisel over the phone if unable to meet in person. Patient provides on 12/3 that he does not want to return home, though has stated previously when lucid that he would not at all want LTC/TCU placement.     Hepatic Encephalopathy, resolving   Alcoholic cirrhosis c/b refractory ascites, MELD 19  Recently discharged on 11/8/18.  Ammonia very elevated this admission.  Cause of decline unclear as no evidence for infection, decreased lactulose compliance, etc. Albumin, vitamin K challenges completed.  - Lactulose, rifaximin  - Consider therapeuic paracentesis prior to discharge if patient symptomatic from expansion     Generalized weakness  History of seizures  Parietal astrocytoma s/p partial resection, chemo, XRT in 2012 c/b seizures    Seen by neuro earlier this stay.  Some question of NCSE by initial EEG, but neuro felt this is less likely.  Planning to taper off carbamazepine after on keppra for ~1 week.  - MRI head unchanged from prior  - Neurology consulted, appreciate recs; see their note 11/29 for taper plan    JOSEF,  improving, prerenal  Creatinine 1.62 on admission (baseline ~0.8). UA with not concerning for infection. Cr bump following addition of home diuretics, will hold.   - Hold lasix/aldactone tomorrow until Cr rechecked  - Albumin/LR bolus PRN  - Continue to trend Cr     Severe Malnutrition  - Continue PTA folic acid, methylphenidate, thiamine, D3  - Patient/family have been very clear that would not want feeding tube.  Continue to encourage oral intake.    Cough  Chronic non-productive cough, afebrile, CXR unremarkable on admission. Likely due to abdominal distension due to worsening ascites. Might have improvement after therapeutic paracentesis at come point.   - continue to monitor   - consider therapeutic para when JOSEF resolved and patient more stable if contributing      Pancytopenia  Hg 12.3, WBC 3.9, Plt 41 on admission. Has worsened and perhaps found alisha. No source of active bleeding.  Noted in heme/onc outpatient notes for past several years, but no clear source found.  - Continue to trend; consider outpatient follow up    Diet: Regular  Fluids: PO  Lines: PIV, port   Munoz Catheter: not present    DVT Prophylaxis: SCDs given thrombocytopenia  Code Status: DNR/DNI  Expected discharge: Tomorrow, recommended to prior living arrangement vs TCU once mental status at baseline and true functional status evaluated.     The patient's care was discussed with the Attending Physician, Dr. Graves.    Arpan Haley [facility]  [facility] 3  Please see sticky note for cross cover information    Interval History    NAEO.  More alert today, despite being woken from sleep. Communicative though slow to respond. Able to deny abdominal pain, issues with breathing, HA, fever. Remainder of ROS complicated by somnolence/broken responses.      4 point ROS completed and is negative unless noted above.      Physical Exam   Vital Signs: Temp: 96.8  F (36  C) Temp src: Oral BP: 103/68 Pulse: 62   Resp: 16 SpO2: 97  % O2 Device: None (Room air)    Weight: 169 lbs 6.4 oz  General Appearance: Lying bed, more alert today, AxO x 1, though just woken from sleep  Respiratory: CTAB, comfortable on room air, nwob  Cardiovascular: RRR, no m/r/g  GI: mildly distended, fluid waves, non-tender, hyperactive bowel sounds  Skin: jaundiced  Neuro: Follows simple commands today, only oriented to self and perhaps location.     Labs/Meds/Imaging: Reviewed in Epic

## 2018-12-03 NOTE — PLAN OF CARE
Problem: Patient Care Overview  Goal: Plan of Care/Patient Progress Review  PT-5B- Pt soundly sleeping at PT arrival, wakes to voice, pt declined PT evaluation, PT explained rationale to complete assessment to assess mobility as pt stating he would like to go home, pt continued to decline despite much encouragement, PT suggested another time later in day, pt declined PT return. Pt reports pain in left thigh.

## 2018-12-03 NOTE — PROGRESS NOTES
12/03/18 1233   Quick Adds   Type of Visit Initial PT Evaluation   Living Environment   Lives With spouse   Living Arrangements house   Home Accessibility bath not on first floor;bed not on first floor   Number of Stairs to Enter Home 0   Number of Stairs Within Home 17   Stair Railings at Home inside, present on left side   Transportation Available family or friend will provide   Living Environment Comment Pts wife works during the day, pts Mother in law present during the day to assist him in his home from 9am-7pm, has home care RN for medications set up, has aide for bathing 1-2x/week, PT/OT/SLP 1x /week.   Self-Care   Usual Activity Tolerance fair   Current Activity Tolerance poor   Regular Exercise yes   Activity/Exercise Type strength training   Exercise Amount/Frequency 2 times/wk   Equipment Currently Used at Home wheelchair, manual;walker, standard;cane, straight;shower chair;grab bar   Activity/Exercise/Self-Care Comment Pt utilizes w/c at home, pt tx bed->w/c, w/c to toilet.    Functional Level Prior   Ambulation 1-->assistive equipment   Transferring 1-->assistive equipment   Toileting 3-->assistive equipment and person   Bathing 3-->assistive equipment and person   Dressing 2-->assistive person   Eating 0-->independent   Communication 0-->understands/communicates without difficulty   Swallowing 0-->swallows foods/liquids without difficulty   Cognition 1 - attention or memory deficits  (pts family reports pt with ADD)   Fall history within last six months no   Which of the above functional risks had a recent onset or change? ambulation;transferring;toileting;bathing;dressing;cognition   Prior Functional Level Comment Mother in law reports that patient typically using W/C at home and transfers from bed or toilet to W/C with assist of 1. Patient will push W/C at times otherwise needs assistance when on carpeting and doorways for navigate. Details obtained from mother in law as pt is poor historian.   "  General Information   Onset of Illness/Injury or Date of Surgery - Date 11/26/18   Referring Physician Teofilo Hamm MD   Patient/Family Goals Statement \"I want to go home'   Pertinent History of Current Problem (include personal factors and/or comorbidities that impact the POC) Pt is a 48 year old male with history of left parietal astrocytoma s/p resection, ESLD c/b refractory ascites requiring every 2 week paracentesis, hepatic encephalopathy, rectal and esophageal varices and PVT, and hypothyroidism, who presents with encephalopathy and generalized weakness.   Precautions/Limitations fall precautions;seizure precautions   General Info Comments Pt unable to provide PLOF information, information provided by pts mother in law who was present during PT evaluation.   Cognitive Status Examination   Orientation person;place   Level of Consciousness alert;other (see comments);confused  (distracted, family reports h/o ADD)   Follows Commands and Answers Questions 50% of the time;unable to answer questions   Personal Safety and Judgment impaired   Memory impaired   Cognitive Comment Pts mother in law present providing much of his demographic information   Pain Assessment   Patient Currently in Pain Yes, see Vital Sign flowsheet  (reports left thigh pain)   Integumentary/Edema   Integumentary/Edema Comments abdominal ascites noted   Posture    Posture Forward head position   Range of Motion (ROM)   ROM Quick Adds No deficits were identified   Strength   Strength Comments B knee extrensors 3/5, B hip flexors 2/5, PF/DF 3/5   Bed Mobility   Bed Mobility Comments Pt rolled in bed with Mod A x 2. Pt tx sidelying->sit with Max A x 2.   Transfer Skills   Transfer Comments Pt tx sit->stand with Mod A x 2.    General Therapy Interventions   Planned Therapy Interventions bed mobility training;gait training;neuromuscular re-education;strengthening;transfer training;progressive activity/exercise   Clinical Impression " "  Criteria for Skilled Therapeutic Intervention yes, treatment indicated   PT Diagnosis Impaired Functional Mobility, Weakness   Influenced by the following impairments weakness,    Functional limitations due to impairments bed mob, transfers, gait   Clinical Presentation Evolving/Changing   Clinical Presentation Rationale clinical judgement   Clinical Decision Making (Complexity) Low complexity   Therapy Frequency` 5 times/week   Predicted Duration of Therapy Intervention (days/wks) 12/10/18   Anticipated Discharge Disposition Transitional Care Facility   Risk & Benefits of therapy have been explained Yes   Patient, Family & other staff in agreement with plan of care Yes   [facility] TM \"6 Clicks\"   2016, Trustees of [facility], under license to [employer].  All rights reserved.   6 Clicks Short Forms Basic Mobility Inpatient Short Form   [facility]  \"6 Clicks\" V.2 Basic Mobility Inpatient Short Form   1. Turning from your back to your side while in a flat bed without using bedrails? 2 - A Lot   2. Moving from lying on your back to sitting on the side of a flat bed without using bedrails? 2 - A Lot   3. Moving to and from a bed to a chair (including a wheelchair)? 2 - A Lot   4. Standing up from a chair using your arms (e.g., wheelchair, or bedside chair)? 2 - A Lot   5. To walk in hospital room? 1 - Total   6. Climbing 3-5 steps with a railing? 1 - Total   Basic Mobility Raw Score (Score out of 24.Lower scores equate to lower levels of function) 10   Total Evaluation Time   Total Evaluation Time (Minutes) 10     "

## 2018-12-03 NOTE — PLAN OF CARE
Problem: Patient Care Overview  Goal: Plan of Care/Patient Progress Review  Outcome: No Change  More alert today. Confused to time, situation. Took meds with much encouragement. Refused late evening meds. Poor appetite. Ate 1 piece of pizza from home. Refused dinner tray from kitchen. Incontinent bowel and bladder. BM x3 this evening. Up to chair x2 with max assist of 2 and much verbal cueing. Pt was not able to get back to bed after 2nd time being up and the lift was used. Possible pressure injury to coccyx. Deep purple, nonblanchable discolorations spreading outward from coccyx. Skin intact. WOC consult requested. Pulsate mattress applied to bed. Harjeet cushion to chair. Continues with repositioning q 2hrs. WOC nurse called to request a mepilex on coccyx. Dressing applied. Port accessed, hep locked. Continue to assess orientation and provide for pt safety. VPM for safety. Assess skin q shift. Reposition q 2hrs. Pt has signed/held orders for Upper GI in OR tomorrow. No mention of procedure in MD note. Paged crosscover x2 ([phone]) to find out if procedure was something team was aware of but there was no return call. Please follow up and release orders.

## 2018-12-03 NOTE — PROGRESS NOTES
[facility], [place]   Palliative Care Daily Progress Note          Recommendations, Patient/Family Counseling & Coordination     Recommendations: Patient seen and examined. Mother in law present and offers concerns that Mono is not on Methylphenidate or another un named serotonin specific reuptake inhibitor and wonders if this is contributing to his present depressed mood. Limited historian due to underlying encephalopathy. Remains able to participate and cooperate in elements of exam.  -Palliative care will continue to follow. Will call patient's spouse tomorrow to discuss in more detail their goals of care in the setting of recurrent hospitalizations and underlying encephalopathy. Care conference would be indicated if unit SW can arrange.   -Will ask palliative counseling to see pending outcome of discussion with the patient's spouse.  -Agree with ongoing paracentesis for symptom management- was weekly   -Patient unable to articulate spiritual needs.  We will have palliative  follow.  - Discussed family concern about Ritalin and serotonin specific reuptake inhibitor per family.  POLST- incomplete     Thank you for the opportunity to continue to participate in the care of this patient and family.  Please feel free to contact on-call palliative provider with any emergent needs.  We can be reached via team pager [phone] (answered 8-4:30 Monday-Friday); after-hours answering service ([phone])     Kwesi Miller, JOHN ACHANGELO MOTLEY NP  Nurse Practitioner- Lead Advanced Practice Provider  [facility] Palliative Medicine Consult Service   [phone]  TT spent:40 minutes of which 30 minutes were spent in direct face to face contact with patient/family. Greater than 50% of time spent counseling and/or coordinating care.       Assessment     Diagnoses & symptoms:        48 year old male with history of left parietal astrocytoma s/p resection- (2013), ESLD c/b  refractory ascites requiring  weekly paracentesis (removing 8-12 L each time- receiving albumin), hepatic encephalopathy- on lactulose therapy, rectal and esophageal varices and PVT (not currently on anticoagulation medication), who presented again to hospital on 11/26/2018 with confusion, encephalopathy and generalized weakness. Note prior hospitalizations with admission dates of 11/1/2018 and 10/15/2018.           Interval History:   Continues with interval confusion, lethargy consistent with HE. MIL present- see above. No tremor. No fever or chills. PT evaluation today. Recommend TCU. Pt seems interested in restorative efforts. Tells me today he doesn't want to go home- confirms he needs more therapy time and to get stronger.            Review of Systems:   Palliative Symptom Review (0=no symptom/no concern, 1=mild, 2=moderate, 3=severe):      Pain: 0-1      Fatigue: 1-2      Nausea: 0          Constipation: 0      Diarrhea: 0      Depressive Symptoms: 1-2      Anxiety:0-1      Drowsiness: 1-2      Poor Appetite: 2      Shortness of Breath: 0-1                Medications:   I have reviewed this patient's medication profile and medications given in past 24 hours.        {   Physical exam Vitals: /68 (BP Location: Left arm)  Pulse 62  Temp 96.8  F (36  C) (Oral)  Resp 16  Wt 76.8 kg (169 lb 6.4 oz)  SpO2 97%  BMI 24.31 kg/m2  BMI= Body mass index is 24.31 kg/(m^2).   General appearance: Somewhat lethargic, oriented to self and hospital as well as family but vague about details of his condition. Sitting in bedside chair.; Slow to respond to questions  HEENT: Symmetric features, EOMI, sclera mildly icteric.  Mucous membranes moist tongue midline.  Respiratory: Clear to auscultation bilaterally.  Comfortable on room air.  No wheeze or rales.    Cardiovascular: S1-S2 without murmur.  RRR, rate in the 70s at rest.  Abdomen: Distended. Brief exam. Diffusely tender. No focal findings other than ascites.  Skin:  Jaundice, no evidence for open areas. Port right chest is accessed and site appears clean dry and intact.  Other: Generalized weakness. No resting tremor identified. Slow to respond to questions and sometimes disorganized thought patterns.                   Data Reviewed:   ROUTINE LABS (Last four results)  BMP  Recent Labs  Lab 12/03/18  0559 11/30/18  0542 11/29/18  0726 11/28/18  0602 11/27/18  2059   NA  --  140 143 142 139   POTASSIUM 3.2* 3.4 3.5 3.6 3.6   CHLORIDE  --  113* 117* 112* 110*   UCHE  --  8.4* 8.5 8.9 8.8   CO2  --  20 19* 22 21   BUN  --  26 30 30 30   CR 1.33* 1.16 1.19 1.39* 1.38*   GLC  --  95 103* 99 91     CBC  Recent Labs  Lab 12/01/18  0539 11/30/18  0542 11/29/18  0726 11/28/18  0602   WBC 1.6* 1.8* 1.4* 1.5*   RBC 2.95* 2.96* 3.03* 3.04*   HGB 9.6* 9.8* 9.9* 9.8*   HCT 29.4* 29.2* 30.4* 30.3*    99 100 100   MCH 32.5 33.1* 32.7 32.2   MCHC 32.7 33.6 32.6 32.3   RDW 14.7 14.6 15.2* 15.2*   PLT 34* 34* 33* 32*     INR  Recent Labs  Lab 12/01/18  0539 11/30/18  0542 11/29/18  0726 11/28/18  0602   INR 1.88* 1.90* 1.74* 1.82*

## 2018-12-03 NOTE — PLAN OF CARE
Problem: Patient Care Overview  Goal: Plan of Care/Patient Progress Review  Discharge Planner SLP   Patient plan for discharge: not stated  Current status: The patient was seen for dysphagia f/u this AM. He was confused but alert with slurred speech and slow processing during this session. He demonstrated WFL oropharyngeal swallowing function to remain on a regular diet and thin liquids, whole pills with thin liquid. SLP will f/u 1-2 more sessions as AMS may impact swallowing function. Please feed only when alert, fully upright and engaged in the task.  Barriers to return to prior living situation: AMS, medical status, weakness  Recommendations for discharge: defer to medical team and decisions about goals of care  Rationale for recommendations: expect patient to reach SLP goals prior to hospital discharge       Entered by: Maricarmen Johnston 12/03/2018 10:36 AM

## 2018-12-03 NOTE — PLAN OF CARE
Problem: Patient Care Overview  Goal: Plan of Care/Patient Progress Review  Outcome: No Change  Patient intermittently sleepy, alert. Orientation to place, situation varies. WOC consult placed for new suspected pressure sore on pt's coccyx. Currently covered in Mepilex, WOC nurse to evaluate in a.m. Pt on pulsate mattress, turned q2h. Signed and held orders for GI procedure to be cancelled, pt currently NPO. Incontinent of bowel and bladder. Plan to work with palliative care to determine pt's return to home or placement in NH. Will continue to monitor and alert MDs to any changes.

## 2018-12-03 NOTE — PROGRESS NOTES
Social Work Services Progress Note    Hospital Day: 8  Collaborated with:  Primary team Piedad 3, pt's spouse Yisel (ph [phone])    Data:  Pt is a 48-year-old male who presented with encephalopathy and generalized weakness.     Intervention:  Received update from primary team that pt worked with PT today and PT is recommending TCU placement. Spoke with pt's spouse/healthcare agent Yisel to discuss. Yisel agreeable with TCU recommendation although she states pt will not be very happy with this. Yisel discouraged as she states yesterday pt was very verbal and an assist of 1, but today pt is confused and she states her mother told her pt is now an assist of 3. Yisel requests referrals to [facility] [place]. Have not yet faxed referral as PT recommendation not yet entered.    Referral status- will fax when PT notes entered:  1) [facility] ( [phone])  2) [facility] ( [phone])    Assessment:  Pursuing TCU placement    Plan:    Anticipated Disposition:  Facility:  TCU    Barriers to d/c plan:  Placement    Follow Up:  SW to follow for discharge planning    FRANKLYN Felix, LGSW  5A Unit   Pager [phone]  Phone [phone]

## 2018-12-03 NOTE — PLAN OF CARE
Problem: Patient Care Overview  Goal: Plan of Care/Patient Progress Review  OT 5B. HOLD. OT orders acknowledged and appreciated. Per conversation with PT, pt with limited tolerance for therapy at this time. PT will continue to follow to address mobility. OT to initiate when appropriate.

## 2018-12-03 NOTE — PLAN OF CARE
"Problem: Patient Care Overview  Goal: Plan of Care/Patient Progress Review  Outcome: No Change  Pt alert today but confused to time and situation. Pt became agitated with staff during physical therapy stating he \"just didn't have it in him\". Also became agitated with writer during morning medications stating \"that's too many I don't want them all\". Pt was agreeable to meds when provided education and with family member at bedside. Evaluated by speech. Wound consulted completed for DTI on sacrum (see note). Pt up in chair today with PT, max heavy 2 assist. Turned with pillows in bed and in chair to avoid pressure on sacrum. Incontinent of bowel and bladder. Eating small portions of each meal but good fluid intake throughout the day. Mother in law at bedside throughout the day and attentive to cares. Mother in law stated she is worried \"about his depression and his ability to understand the goals to make it home\". Palliative and primary team both rounded answering questions. Continue to monitor.       "

## 2018-12-03 NOTE — PROGRESS NOTES
"[facility] Nurse Inpatient Pressure Injury Assessment   Reason for consultation: Evaluate and treat Sacral pressure injury      ASSESSMENT  Pressure Injury: on BL sacrum , hospital acquired   Pressure Injury is Stage Deep Tissue Pressure Injury (DTPI)   Contributing factor of the pressure injury: pressure and shear  Status: initial assessment     TREATMENT PLAN  Sacral wound: Every 3 days and Weekly   1. Clean wound with saline or MicroKlenz Spray, pat dry  2. Paint periwound tissue with No Sting Skin Prep (#310411) and allow to dry thoroughly  3. Press a Mepilex  Sacral Dressing (PS#[id number])  to the area, making sure to conform nicely to skin curvatures   4. Time and date dressing change and srinivas with a \"T\" for treatment of a wound  5. Reposition pt every 1 to 2 hours when in bed and hourly when up to the chair to relieve pressure and promote perfusion to tissue  NOTE- continue to assess under dressing and document findings BID, per protocol    Pressure Injury Prevention (PIP) MEASURES:  1. If pt is refusing to turn or reposition they must be educated on the potential of injury due to not off loading.  Then this \"educated refusal\" needs to be documented as an \"educated refusal to turn/ reposition\" and document if alert, etc. Additionally, you MUST notify the charge nurse, nurse manager and the provider of the pt's refusal to reposition.  2. Follow Luis Risk recommendations  ? Nutrition following?  ? Moisture and Incontinence issues addressed?    3. CHAIR POSITIONING:  REPOSITIONING   Fully off load every 1-2 hours (stand x 5 minutes or back to bed)   Shift side to side every 15 minutes  Chair cushion ([id number]) when up to chair (pillow does not actually off load)  NOTE: the Z-Flow Pad is NOT a cushion, pt should NOT sit on the Z-Flow pads  4.  BED:    POSTIONING  ? No direct supine positioning, position only side to side  ? Try to keep HOB below 30 degrees  ? Reposition every 1-2 hours  ? Consider Z-Nik Pillows to help " keep pt on side (#810848-medium or #61313- large). *Z-Flows are for positioning, DO NOT SIT ON!  ? Keep heels elevated- one pillow under each leg from knee to heels, checking heels are free    Orders Written  WO Nurse follow-up plan:weekly  Nursing to notify the Provider(s) and re-consult the WO Nurse if wound(s) deteriorates or new skin concern.    Patient History  According to provider note(s):  Mono Varghese is a 48 year old male with history of left parietal astrocytoma s/p resection, ESLD c/b refractory ascites requiring every 2 week genia (removing 8-12 L each time and receiving albumin), hepatic encephalopathy, rectal and esophageal varices and PVT (not currently on anticoagulation medication), and hypothyroidism, who presents with encephalopathy and generalized weakness.      Objective Data  Containment of urine/stool: Diaper and Incontinence Protocol    Current Diet/ Nutrition:    Active Diet Order      Regular Diet Adult Thin Liquids (water, ice chips, juice, milk, gelatin, ice cream, etc) (with 1:1, supervision)    Output:   I/O last 3 completed shifts:  In: 505 [P.O.:475; I.V.:30]  Out: -     Risk Assessment:   Sensory Perception: 3-->slightly limited  Moisture: 2-->very moist  Activity: 2-->chairfast  Mobility: 2-->very limited  Nutrition: 2-->probably inadequate  Friction and Shear: 1-->problem  Luis Score: 12      Labs:   Recent Labs  Lab 12/01/18  0539   ALBUMIN 3.8   HGB 9.6*   INR 1.88*   WBC 1.6*       Physical Exam  Skin inspection: focused sacral/buttock      Wound Location:  BL sacrum    Wound Photo 12/3/18  Wound history: Pt is on lactulose with frequent diarrhea stools, increased confusion and nurses report sitting up more in bed. Nursing has implemented mepilex and ordered a pulsate mattress  Wound Description: Pt has BL linear line running East and West that are mirror images over right and left sacrum that are brown/purple non blanchable erythema.         Interventions  Current  support surface: Standard  Low air loss mattress  Current off-loading measures: Pillows under calves  Repositioning aid: Turn and Position System, Seated Positioning System and Pillows  Visual inspection of wound(s) completed   Wound Care: was done per plan of care.  Supplies: placed at the bedside  Educated provided: importance of repositioning and plan of care  Education provided to: patient  and nurse  Discussed importance of:repositioning every 2 hours, off-loading pressure to wound, their role in pressure injury prevention, head elevation <30 degrees and off-loading mattress  Discussed plan of care with Patient and Nurse    Abdelrahman Peoples RN

## 2018-12-04 NOTE — PROGRESS NOTES
Focus:  Palliative Care    D:  Mono Varghese is a 48 year old male with history of left parietal astrocytoma s/p resection, ESLD c/b refractory ascites requiring every 2 week genia (removing 8-12 L each time and receiving albumin), hepatic encephalopathy, rectal and esophageal varices and PVT (not currently on anticoagulation medication), and hypothyroidism, who presents with encephalopathy and generalized weakness.     I:  I called Mono's wife Yisel to introduce myself and my role on the palliative care team. Yisel is familiar with her services due to her own health concerns. Yisel reported that she is frustrated with Mono's care noting that she was here all weekend and felt that nursing staff wasn't checking on him enough specifically about giving lactulose. We also discussed that there is a care conference scheduled for tomorrow and she is hoping to address these concerns. I informed her that I was aware of the care conference and would try to make it to meet in person. She denied any other concerns at this time.        A:  Yisel is happy that the care conference will be happening and overall feels that she is coping well at this time.     P: I plan to attend the care conference tomorrow and offer ongoing support to Mono and Yisel while Mono is in the hospital.       Judy ESTES, ELDON  Palliative Care Social Work Fellow  Pager: [phone]    [facility] Inpatient Team Consult pager [phone] (M-F 8-4:30)  After-hours Answering Service [phone]

## 2018-12-04 NOTE — PLAN OF CARE
"Problem: Patient Care Overview  Goal: Plan of Care/Patient Progress Review  PT-5B- Cancel, pt declined PT, states \"how do you like if someone barged into your home\", PT had knocked and introduced self upon entrance. Pt also reporting he does not want to participate in PT in the morning, declines PT. Per chart review pt to have Care Conference at 1530 on 12/5, PT will continue to follow and await recommendations and patient and family decisions from care conference.      "

## 2018-12-04 NOTE — PLAN OF CARE
Problem: Patient Care Overview  Goal: Plan of Care/Patient Progress Review  Outcome: No Change  Pt more alert today. Able to hold small conversation and respond appropriately at times. Pt reclined up in chair for majority of the day. Ate pizza from home that is in pt fridge, claimed he doesn't eat the hospital food because he doesn't like it. Pt agreed to try boost supplements with ice cream. Pt had K+ of 2.9 today and only agreed to take half of the replacement amount ordered (60meq) and writer ordered recheck for 1430. Pt also refusing half of lactulose dose both times today, and throwing med cups and cursing at writer when she suggested that he should take whole dose. Was incontinent of urine and bowel x1. Port needle and dressing changed today. Continue to monitor.

## 2018-12-04 NOTE — PROGRESS NOTES
"Social Work Services Progress Note     Hospital Day: 9  Collaborated with:  Primary team [facility] 3     Data:  Pt is a 48-year-old male who presented with encephalopathy and generalized weakness.      Intervention:  Faxed TCU referrals to [facility] and [facility].     Referral status:  1) [facility] ( [phone])- declined, \"no available placement at this time\"  2) [facility] ( [phone])     Assessment:  Pursuing TCU placement     Plan:    Anticipated Disposition:  Facility:  TCU    Barriers to d/c plan:  Placement    Follow Up:  SW to follow for discharge planning     FRANKLYN Felix, LGSW  5A Unit   Pager [phone]  Phone [phone]  "

## 2018-12-04 NOTE — PLAN OF CARE
Problem: Patient Care Overview  Goal: Plan of Care/Patient Progress Review  Outcome: No Change  Pt alert and oriented to self and place. Didn't answer orientation questions for situation and time. Pt is very flat and depressed, wife here at bedside this evening. Ritalin started today. Pt is on scheduled lactulose and had 3 large BMs this shift. Incontinent of urine and bowel. Pt denies any pain. Redness to sacral area, pt is on a pulsate mattress, repositioned every 2 hours. Palliative care team following pt.

## 2018-12-04 NOTE — PROGRESS NOTES
[facility], [place]    Internal Medicine Progress Note - [facility] 3 Service        Assessment & Plan   Mono Varghese is a 48 year old male with history of left parietal astrocytoma s/p resection, ESLD c/b refractory ascites requiring every 2 week genia (removing 8-12 L each time and receiving albumin), hepatic encephalopathy, rectal and esophageal varices and PVT (not currently on anticoagulation medication), and hypothyroidism, who presents with encephalopathy and generalized weakness.      Goals of Care  Patient & wife very clear about not wanting any extra tubes/lines/etc.  Very interested in meeting with palliative. Mono was not able to participate with palliative due to being altered on 12/2. Palliative will revisit almost daily, will need to visit with wife Yisel over the phone if unable to meet in person. Patient provides on 12/3 that he does not want to return home, though has stated previously when lucid that he would not at all want LTC/TCU placement.   -Care conference in next 1-2 days    Hepatic Encephalopathy, resolving   Alcoholic cirrhosis c/b refractory ascites, MELD 19  Recently discharged on 11/8/18.  Ammonia very elevated this admission.  Cause of decline unclear as no evidence for infection, decreased lactulose compliance, etc. Albumin, vitamin K challenges completed.  - Lactulose, rifaximin  - Consider therapeuic paracentesis prior to discharge if patient symptomatic from expansion     Generalized weakness  History of seizures  Parietal astrocytoma s/p partial resection, chemo, XRT in 2012 c/b seizures    Seen by neuro earlier this stay.  Some question of NCSE by initial EEG, but neuro felt this is less likely.  Planning to taper off carbamazepine after on keppra for ~1 week.  - MRI head unchanged from prior  - Neurology consulted, appreciate recs; see their note 11/29 for taper plan    JOSEF, improving, prerenal  Creatinine 1.62 on admission (baseline ~0.8). UA  not concerning for infection. Cr bump following addition of home diuretics, but then improved/stabilized.  - Continue home diuretics  - Continue to trend Cr     Severe Malnutrition  - Continue PTA folic acid, methylphenidate, thiamine, D3  - Patient/family have been very clear that would not want feeding tube.  Continue to encourage oral intake.    Pancytopenia  No source of active bleeding.  Noted in heme/onc outpatient notes for past several years, but no clear source/etiology found.  - Continue to trend; consider outpatient follow up pending goals of care    Diet: Regular  Fluids: PO  Lines: PIV, port   Munoz Catheter: not present    DVT Prophylaxis: SCDs given thrombocytopenia  Code Status: DNR/DNI  Expected discharge: Tomorrow, recommended to prior living arrangement vs TCU once mental status at baseline and true functional status evaluated.     The patient's care was discussed with the Attending Physician, Dr. Graves.    Teofilo Hamm  [facility] 3  Please see sticky note for cross cover information    Interval History    NAEO.  Alert today, but somewhat slower than prior answering questions.  Frustrated by ongoing weakness.  Denies any pain complaints.  Doesn't think abdomen is large enough yet to need paracentesis.     4 point ROS completed and is negative unless noted above.      Physical Exam   Vital Signs: Temp: 96.3  F (35.7  C) Temp src: Oral BP: 100/72 Pulse: 64 Heart Rate: 71 Resp: 16 SpO2: 97 % O2 Device: None (Room air)    Weight: 169 lbs 6.4 oz  General Appearance: Sitting up in chair, NAD  Respiratory: CTAB, comfortable on room air, nwob  Cardiovascular: RRR, no m/r/g  GI: mildly distended but non tense.  Non tender.   Skin: warm, dry, no rashes  Neuro: Oriented to self & location only but otherwise able to answer simple questions.  R sided weakness unchanged.  Able to answer in short phrases, through requires considerable effort.    Labs/Meds/Imaging: Reviewed in  [facility]    Internal Medicine Staff Addendum  Date of Service: 12/4/2018    I have seen and examined this patient, reviewed the data and discussed the plan of care. I agree with the above documentation including plan and ddx unless otherwise stated:     #    Adeel Seo MD  Internal Medicine [facility]  Attending pager: [phone]

## 2018-12-04 NOTE — PROGRESS NOTES
[facility], [place]   Palliative Care Daily Progress Note          Recommendations, Patient/Family Counseling & Coordination     Recommendations: Patient seen and examined. No family present today. Note restart of Methylphenidate may have led to somewhat brighter mood. Offering conversation this am. Continue to be limited historian due to underlying encephalopathy. Remains able to participate and cooperate in elements of exam.  -Palliative care will continue to follow. Note plan for care conference with patient's spouse tomorrow to discuss in more detail their goals of care in the setting of recurrent hospitalizations and underlying encephalopathy.   -Palliative counseling to see pending outcome of discussion with the patient's spouse.  -Agree with ongoing paracentesis for symptom management- was weekly   -Patient unable to articulate spiritual needs. Palliative  following as needed.  POLST- incomplete     Thank you for the opportunity to continue to participate in the care of this patient and family.  Please feel free to contact on-call palliative provider with any emergent needs.  We can be reached via team pager [phone] (answered 8-4:30 Monday-Friday); after-hours answering service ([phone])     JOHN JackPN  Kwesi Miller APRN NP  Nurse Practitioner- Lead Advanced Practice Provider  [facility] Palliative Medicine Consult Service   [phone]  TT spent: 35 minutes of which 18 minutes were spent in direct face to face contact with patient/family. Greater than 50% of time spent counseling and/or coordinating care.       Assessment     Diagnoses & symptoms:        48 year old male with history of left parietal astrocytoma s/p resection- (2013), ESLD c/b refractory ascites requiring  weekly paracentesis (removing 8-12 L each time- receiving albumin), hepatic encephalopathy- on lactulose therapy, rectal and esophageal varices and PVT (not currently on  anticoagulation medication), who presented again to hospital on 11/26/2018 with confusion, encephalopathy and generalized weakness. Note prior hospitalizations with admission dates of 11/1/2018 and 10/15/2018.           Interval History:   Continues with interval confusion, lethargy consistent with HE.  No tremor. No fever or chills. PT evaluation today. Recommending TCU. Pt seems interested in restorative efforts. Tells me today he doesn't recall telling me yesterday that he didn't want to go home- confirms today that prior to going home he needs more therapy time and to get stronger.            Review of Systems:   Palliative Symptom Review (0=no symptom/no concern, 1=mild, 2=moderate, 3=severe):      Pain: 0      Fatigue: 1-2      Nausea: 0          Constipation: 0      Diarrhea: 0 (lactulose therapy)       Depressive Symptoms: 1-2      Anxiety:1      Drowsiness: 1      Poor Appetite: 2      Shortness of Breath: 0-1                Medications:   I have reviewed this patient's medication profile and medications given in past 24 hours.        {   Physical exam Vitals: /62 (BP Location: Left arm)  Pulse 68  Temp 95.8  F (35.4  C) (Axillary)  Resp 16  Wt 76.8 kg (169 lb 6.4 oz)  SpO2 94%  BMI 24.31 kg/m2  BMI= Body mass index is 24.31 kg/(m^2).   General appearance: less lethargic, oriented to self and hospital as well as family but vague about details of his condition. Sitting in bedside chair. Slow to respond to questions. Initiating some conversation.   HEENT: Symmetric features, EOMI, sclera mildly icteric. Mucous membranes moist - tongue midline.  Respiratory: Clear to auscultation bilaterally. Comfortable on room air. No wheeze or rales.    Cardiovascular: S1-S2 without murmur.  RRR, rate in the 80s at rest.  Abdomen: Distended. Brief exam. Diffusely tender. No focal findings other than ascites.  Skin: Jaundice, no evidence for open areas. Port right chest is accessed and site appears clean dry and  intact.  Other: Generalized weakness. No resting tremor identified. Slow to respond to questions and sometimes disorganized thought patterns.                   Data Reviewed:   ROUTINE LABS (Last four results)  BMP    Recent Labs  Lab 12/04/18 0718 12/03/18  0559 11/30/18  0542 11/29/18  0726 11/28/18  0602     --  140 143 142   POTASSIUM 2.9* 3.2* 3.4 3.5 3.6   CHLORIDE 114*  --  113* 117* 112*   UCHE 7.5*  --  8.4* 8.5 8.9   CO2 18*  --  20 19* 22   BUN 24  --  26 30 30   CR 1.12 1.33* 1.16 1.19 1.39*   GLC 81  --  95 103* 99     CBC    Recent Labs  Lab 12/04/18 0715 12/01/18 0539 11/30/18  0542 11/29/18  0726   WBC 1.8* 1.6* 1.8* 1.4*   RBC 2.89* 2.95* 2.96* 3.03*   HGB 9.4* 9.6* 9.8* 9.9*   HCT 28.6* 29.4* 29.2* 30.4*   MCV 99 100 99 100   MCH 32.5 32.5 33.1* 32.7   MCHC 32.9 32.7 33.6 32.6   RDW 14.7 14.7 14.6 15.2*   PLT 35* 34* 34* 33*     INR    Recent Labs  Lab 12/01/18  0539 11/30/18  0542 11/29/18  0726 11/28/18  0602   INR 1.88* 1.90* 1.74* 1.82*

## 2018-12-04 NOTE — PLAN OF CARE
Problem: Patient Care Overview  Goal: Plan of Care/Patient Progress Review  Outcome: No Change  A&O to self (does not answer all questions).  Denies pain.  Sacral meplix placed and WOC consult entered for possible deep tissue injury on coccyx/sacrum.  Encouraging fluids.  Repo q2h.  No BM on shift.  Appeared to sleep well overnight.  Continue to monitor.

## 2018-12-04 NOTE — PROGRESS NOTES
Attempt to set up a care conference-     Issues to be addressed (consider patient update, current problems/stressors, needs and goals of patient/family, desired outcome of conference):goals of care    RNCC LVM for both wife and Faby asking to coordinate a team care conference with them today or tomorrow.  Pending phone call back.     Team Members to include: Patient, family, attending and palliative care.     12/4/2018 12:28 PM Pts wife returned call, she is able to come in tomorrow at 330 pm for a care conference, she will update her mother on time.  RNCC paged team and palliative with this time.       Belinda Cardozo, ELANACC, BSN    [facility]  [address] [place] [id number]    [email or web address]  [email or web address]    Office: [phone] Pager: [phone]  To contact weekend RNCC, dial [phone] and enter pager number [phone] at prompt. This pager can not be contacted by text page or outside line.

## 2018-12-05 NOTE — PLAN OF CARE
Problem: Patient Care Overview  Goal: Plan of Care/Patient Progress Review  Outcome: No Change  [id number]: A&O x 2, disoriented to time and situation; VSS on RA; denies pain, nausea and SOB; Regular diet with thin liquids, calorie counts to begin this AM; No incontinence overnight; BS active in all quads; Up with heavy assist of 2; Care conference scheduled for 1530; Will continue to monitor and update [facility] service with any significant changes.

## 2018-12-05 NOTE — PROGRESS NOTES
Palliative Care Inpatient Clinical Social Work Assessment    Patient Information:  Mono Varghese is a 48 year old male with history of left parietal astrocytoma s/p resection, ESLD c/b refractory ascites requiring every 2 week genia (removing 8-12 L each time and receiving albumin), hepatic encephalopathy, rectal and esophageal varices and PVT (not currently on anticoagulation medication), and hypothyroidism, who presents with encephalopathy and generalized weakness.     Visit Summary: I met with Mono, his wife Yisel, and mother in law Faby today. Mono was laying in bed and was able to engage only minimally. Yisel reported that she is very unhappy with this hospitalization and feels Mono isn't getting his needs met while here.  A care conference was held after I met with the patient and family individually. During the care conference, the same issues were addressed with the medical provider and the care coordinator. We also discussed next steps after this hospitalization and the family would like Mono to go to a TCU in hopes to get strong enough to come back home. They have a strong preference for TCU's that are close to home.     Assessment: Yisel and Faby have a lot of frustrations with both the staff and with Mono's progress while in the hospital. They are concerned that Mono is not eating enough or getting enough physical therapy. Yisel was tearful at times when talking about Mono's care. She is willing to explore additional TCU placements if necessary but would prefer the original two options that she provided.       Relevant Symptoms/Concerns     Physical:  Mono is having a lot of confusion due to hepatic encephalopathy. He has been refusing to eat at times and agitated with staff during cares.    Psychological/Emotional/Existential: During the visit, when Mono was trying to engage, he appeared frustrated that he was unable to find the words he was looking for.     Family/Social/Caregiver: Yisel and Faby are very distressed about how this hospitalization has been going for Mono. They reflected on past visits for both Mono and Yisel and feel that Mono needs more care than is being provided. The care coordinator was going to continue the discussions with his nurse and the nurse manager on the unit.       Developmental:     Mental Health:     End of Life:     Cultural/Caodaism/Spiritual:     Grief/Loss:     Concurrent Stressors: At times, Mono has been making comments that he does not want to go home. During the care conference, he made that statement and then said that he wants to be in the community.      Comments:      Strengths     Physical: Mono has been more alert and engaging over the past few days. He was sitting up eating some peaches during the care conference.    Psychological/Emotional/Existential:     Family/Social/Caregiver: Faby is a retired NP and is very involved in his care both in the hospital and when he is at home. Yisel has been a strong support for Mono with her encouragement and advocating for his needs.      Developmental:     Mental Health:     End of Life:     Cultural/Caodaism/Spiritual:     Grief/Loss:        Comments:      Goals/Decision Making/Advance Care Planning   Preferences:  The family's goal is for Mono to get strong enough to get back home.    Concerns:  The medical provider noted that Mono might not get more mentally alert and his current level of confusion might be his new baseline.    Documents:     Decision Making Issues:       Comments:      Resource Needs     Discharge Planning:  Per Unit/Program  and/or Care Coordinator   Other:     Comments: The unit  and care coordinator are currently working on finding TCU placement      Sources of Information   Patient:  X   Family:  X    Staff:  X   Chart Review:  X   Other:      Intervention (Check all that apply)    X   Assessment of  palliative specific issues      X   Introduction of Palliative clinical social work interventions       Adjustment to illness counseling       Advanced care planning    X   Attended/participated in care conference       Behavioral interventions for symptom management    X   Facilitation of processing of thoughts/feelings       Family communication facilitated       Grief counseling       Goals of care discussion/facilitation       Life legacy work       Life review facilitation       Psychoeducation       Re-framing       Resource referral       Other:       Comments:      Plan:  I will follow up with Mono and family at the end of the week to see how they are coping and if they continue to have the same concerns or if they have noticed any change.     Judy ESTES, LGERNST  Palliative Care Social Work Fellow  Pager: [phone]    [facility] Inpatient Team Consult pager [phone] (M-F 8-4:30)  After-hours Answering Service [phone]

## 2018-12-05 NOTE — PLAN OF CARE
Problem: Patient Care Overview  Goal: Plan of Care/Patient Progress Review  PT- 5B- Cancel, pt in Care Conference.

## 2018-12-05 NOTE — PROGRESS NOTES
[facility], [place]   Palliative Care Daily Progress Note          Recommendations, Patient/Family Counseling & Coordination     Recommendations: Patient seen and examined. No family present today.It appears that restart of Methylphenidate may have led to brighter mood. Continue to be limited historian due to underlying encephalopathy. Remains able to participate and cooperate in elements of exam.  -Palliative care will continue to follow. Note plan for care conference with patient's spouse today to discuss in more detail their goals of care in the setting of recurrent hospitalizations and underlying encephalopathy.   -Palliative counseling to see   -Agree with ongoing paracentesis for symptom management- was weekly pre admission.  -Patient unable to articulate spiritual needs. Palliative  following as needed.  POLST- incomplete     Thank you for the opportunity to continue to participate in the care of this patient and family.  Please feel free to contact on-call palliative provider with any emergent needs.  We can be reached via team pager [phone] (answered 8-4:30 Monday-Friday); after-hours answering service ([phone])     JOHN Jack NP  Nurse Practitioner- Lead Advanced Practice Provider  [facility] Palliative Medicine Consult Service   [phone]  TT spent: 33 minutes of which 22 minutes were spent in direct face to face contact with patient/family. Greater than 50% of time spent counseling and/or coordinating care.       Assessment     Diagnoses & symptoms:        48 year old male with history of left parietal astrocytoma s/p resection- (2013), ESLD c/b refractory ascites requiring  weekly paracentesis (removing 8-12 L each time- receiving albumin), hepatic encephalopathy- on lactulose therapy, rectal and esophageal varices and PVT (not currently on anticoagulation medication), who presented again to hospital on 11/26/2018 with  confusion, encephalopathy and generalized weakness. Note prior hospitalizations with admission dates of 11/1/2018 and 10/15/2018.           Interval History:   Continues with interval confusion, lethargy consistent with HE.  No tremor. No fever or chills. PT seeing but patient often refuses. Recommending TCU. Pt interested in restorative efforts. Seems aware prior to going home he needs more therapy time and to get stronger.            Review of Systems:   Palliative Symptom Review (0=no symptom/no concern, 1=mild, 2=moderate, 3=severe):      Pain: 0      Fatigue: 2      Nausea: 0          Constipation: 0      Diarrhea: 0 (lactulose therapy)       Depressive Symptoms: 1-2      Anxiety:1      Drowsiness: 1      Poor Appetite: 2      Shortness of Breath: 0-1                Medications:   I have reviewed this patient's medication profile and medications given in past 24 hours.        {   Physical exam Vitals: /67 (BP Location: Left arm)  Pulse 67  Temp 96  F (35.6  C) (Axillary)  Resp 16  Wt 75.8 kg (167 lb 3.2 oz)  SpO2 99%  BMI 23.99 kg/m2  BMI= Body mass index is 23.99 kg/(m^2).   General appearance: less lethargic, oriented to self as well as family remains vague about details of his condition. Sitting in bedside chair. Initiating some conversation.   HEENT: Symmetric features, EOMI, sclera mildly icteric. Mucous membranes moist - tongue midline.  Respiratory: Clear to auscultation bilaterally. Comfortable on room air. No wheeze or rales.    Cardiovascular: S1-S2 without murmur.  RRR, rate in the 70s at rest.  Abdomen: Distended. Brief exam. Diffusely tender. No focal findings other than ascites.  Skin: Jaundice, no evidence for open areas. Port right chest is accessed and site appears clean dry and intact.  Other: Generalized weakness. No resting tremor identified. Slow to respond to questions and sometimes disorganized thought patterns.                   Data Reviewed:   ROUTINE LABS (Last four  results)  BMP    Recent Labs  Lab 12/05/18  0443 12/04/18  0718 12/03/18  0559 11/30/18  0542 11/29/18  0726   NA  --  142  --  140 143   POTASSIUM 3.6 2.9* 3.2* 3.4 3.5   CHLORIDE  --  114*  --  113* 117*   UCHE  --  7.5*  --  8.4* 8.5   CO2  --  18*  --  20 19*   BUN  --  24  --  26 30   CR 1.13 1.12 1.33* 1.16 1.19   GLC  --  81  --  95 103*     CBC    Recent Labs  Lab 12/04/18  0715 12/01/18  0539 11/30/18  0542 11/29/18  0726   WBC 1.8* 1.6* 1.8* 1.4*   RBC 2.89* 2.95* 2.96* 3.03*   HGB 9.4* 9.6* 9.8* 9.9*   HCT 28.6* 29.4* 29.2* 30.4*   MCV 99 100 99 100   MCH 32.5 32.5 33.1* 32.7   MCHC 32.9 32.7 33.6 32.6   RDW 14.7 14.7 14.6 15.2*   PLT 35* 34* 34* 33*     INR    Recent Labs  Lab 12/01/18  0539 11/30/18  0542 11/29/18  0726   INR 1.88* 1.90* 1.74*

## 2018-12-05 NOTE — PLAN OF CARE
"Problem: Patient Care Overview  Goal: Plan of Care/Patient Progress Review  Outcome: No Change  Pt alert today and more lucid. Able to communicate slowly and answer questions appropriately. Pt very agitated with staff and writer about taking medications and being turned/cleaned. Making comments to writer like \"i don't want to do this anymore why can't you guys just leave me alone?!\" Pt had one bowel movement today and called for urinal once. Pt refused full dose of lactulose this AM and stated \"ill take it later\" when writer offered 2pm medications. Will try again later. Port hep locked. Care conference today. Continue to monitor.       "

## 2018-12-05 NOTE — PLAN OF CARE
Problem: Patient Care Overview  Goal: Plan of Care/Patient Progress Review  SLP: Cancel - Attempted to see pt for dysphagia f/u, however, pt in care conference. SLP to f/u as appropriate.

## 2018-12-05 NOTE — PLAN OF CARE
Problem: Patient Care Overview  Goal: Plan of Care/Patient Progress Review  Outcome: No Change  VSS on RA. Pt is oriented to self and place. Intermittently confused but more alert today, some agitation at beginning of shift-attempting to get out of bed and yelling at staff. VPM/bed alarm for safety. Later pt was conversational and perked up while talking to his wife about their dog Brandon. Had a large BM this shift and large urinary incontenence. ~ 2015 pt reported not feeling well but was unable to give the writer specific symptoms as to what was bothering him. Gave PRN tylenol with pt's evening meds. This seemed to help. Poor appetite continues, ate half a PB&J and ginger ale. Refused boost. K+ 2.9 this am, pt took 60 meq on day shift and a recheck will be tomorrow am. Care conference tomorrow at 1530. Will continue to monitor pt status.

## 2018-12-05 NOTE — PROGRESS NOTES
General acute hospital, [place]    Internal Medicine Progress Note - [facility] Service        Assessment & Plan   Mono Varghese is a 48 year old male with history of left parietal astrocytoma s/p resection, ESLD c/b refractory ascites requiring every 2 week genia (removing 8-12 L each time and receiving albumin), hepatic encephalopathy, rectal and esophageal varices and PVT (not currently on anticoagulation medication), and hypothyroidism, who presents with encephalopathy and generalized weakness.      Updates today  - care conference at 1530  - decreased carbamazepine to 200 mg AM, 150 mg PM    Goals of Care  Patient & wife very clear about not wanting any extra tubes/lines/etc.  Very interested in meeting with palliative. Mono was not able to participate with palliative due to being altered on 12/2. Palliative will revisit almost daily, will need to visit with wife Yisel over the phone if unable to meet in person. Patient provides on 12/3 that he does not want to return home, though has stated previously when lucid that he would not at all want LTC/TCU placement. Care conference 12/5 at 1530.     Hepatic Encephalopathy, resolving   Alcoholic cirrhosis c/b refractory ascites, MELD 19  Recently discharged on 11/8/18.  Ammonia very elevated this admission.  Cause of decline unclear as no evidence for infection, decreased lactulose compliance, etc. Albumin, vitamin K challenges completed.  - Lactulose, rifaximin  - Consider therapeuic paracentesis prior to discharge if patient symptomatic from expansion     Generalized weakness  History of seizures  Parietal astrocytoma s/p partial resection, chemo, XRT in 2012 c/b seizures    Seen by neuro earlier this stay.  Some question of NCSE by initial EEG, but neuro felt this is less likely.  Planning to taper off carbamazepine after on keppra for ~1 week.  - MRI head unchanged from prior  - Neurology consulted, appreciate recs; see their note 11/29 for  taper plan    JOSEF, improving, prerenal  Creatinine 1.62 on admission (baseline ~0.8). UA not concerning for infection. Cr bump following addition of home diuretics, but then improved/stabilized.  - Continue home diuretics  - Continue to trend Cr     Severe Malnutrition  - Continue PTA folic acid, methylphenidate, thiamine, D3  - Patient/family have been very clear that would not want feeding tube.  Continue to encourage oral intake.    Pancytopenia  No source of active bleeding.  Noted in heme/onc outpatient notes for past several years, but no clear source/etiology found.  - Continue to trend; consider outpatient follow up pending goals of care    Diet: Regular  Fluids: PO  Lines: PIV, port   Munoz Catheter: not present    DVT Prophylaxis: SCDs given thrombocytopenia  Code Status: DNR/DNI  Expected discharge: Tomorrow, recommended to prior living arrangement vs TCU once mental status at baseline and true functional status evaluated.     The patient's care was discussed with the Attending Physician, Dr. Graves.    Patrick Haneyffy  [facility] 3  Please see sticky note for cross cover information    Interval History    NAEO.  Alert today, but somewhat slower than prior answering questions.  Frustrated by ongoing weakness.  Denies any pain complaints.  Doesn't think abdomen is large enough yet to need paracentesis.     4 point ROS completed and is negative unless noted above.      Physical Exam   Vital Signs: Temp: 95.9  F (35.5  C) Temp src: Oral BP: 99/68 Pulse: 67   Resp: 16 SpO2: 99 % O2 Device: None (Room air)    Weight: 169 lbs 0 oz  General Appearance: Sitting up in chair, NAD  Respiratory: CTAB, comfortable on room air, nwob  Cardiovascular: RRR, no m/r/g  GI: mildly distended but non tense.  Non tender.   Skin: warm, dry, no rashes  Neuro: Oriented to self & location only but otherwise able to answer simple questions.  R sided weakness unchanged.  Able to answer in short  phrases, through requires considerable effort.    Labs/Meds/Imaging: Reviewed in [facility]    Internal Medicine Staff Addendum  Date of Service: 12/5/2018    I have seen and examined this patient, reviewed the data and discussed the plan of care. I agree with the above documentation including plan and ddx unless otherwise stated:     #    Adeel Seo MD  Internal Medicine Hospitalist  [facility]  Attending pager: [phone]

## 2018-12-05 NOTE — PROGRESS NOTES
"Social Work Services Progress Note      Hospital Day: 10  Collaborated with:  Primary team [facility]      Data:  Pt is a 48-year-old male who presented with encephalopathy and generalized weakness.       Intervention:  Followed up with [facility]- they did not receive referral; refaxed. [facility] states they likely have no beds through Monday.     Care conference to be held today at 3:30pm.       Referral status:  1) [facility] ( [phone])- declined, \"no available placement at this time\"  2) [facility] ( [phone])- refaxed referral      Assessment:  Pursuing TCU placement      Plan:    Anticipated Disposition:  Facility:  TCU    Barriers to d/c plan:  Placement; GOC conversations    Follow Up:  SW to follow for discharge planning      FRANLKYN Felix, [facility]  5A Unit   Pager [phone]  Phone [phone]  "

## 2018-12-05 NOTE — PROGRESS NOTES
Brief - patient centered care conference    Team Members in attendance include:   Primary Medicine Resident: Dr. Hamm  RNCC: Yisel   Palliative Care Social Work Fellow: Judy   Patient  Patient's wife: Yisel  Patient's mother in law: Faby    Issues to be addressed: Goals of care, short term/long term goals     Discussion: Patient's wife voiced concerns regarding nursing cares related to  incontinence, perceived lack of OOB activity and malnutrition related to lack of assistance with ordering. Frustration was voiced by patient's wife and mother in law related to above concerns. Additionally, family is concerned regarding patient's lack of involvement in therapy. Per chart review, patient has intermittenly been refusing cares and therapy. Discussion of goals of care related to short term goals. The patient and family are hopeful that the patient will participate in therapy and be accepted at a TCU close to their home in [place] for rehabilitation. Discussion around medical treatment regarding encephalopathy took place, as the family was curious if the patient was receiving enough lactulose, clarification of other prior to admission medications as well. Patient was alert for care conference and did voice that he did want to go to a rehab and nodded yes to participating in therapy.     Outcome: Patient's wife was upset during meeting, tearful and frustrated with patient's care. Concerns regarding nursing cares acknowledge, both writer and primary team resident followed up with the unit charge nurse regarding problem solving and encouraging OOB activity and PO intake. Writer informed the family that the two TCU's that the Yisel (wife) had preferred both stated they did not have any beds available until next week. Primary resident reiterated that once a patient is medically stable, we have to find discharge placement due to Medicare guidelines and billing, reiterating that we were working in the patients  best interest, patient's wife voiced frustration. Yisel has requested that SW follow up with her mother, Faby for further referrals.      Follow-Up: SW to make additional referrals to TCU.

## 2018-12-06 NOTE — PLAN OF CARE
Problem: Patient Care Overview  Goal: Plan of Care/Patient Progress Review  Outcome: No Change  Care conference done this afternoon and  Patient to continue with Therapy and plan to discharge to TCU once stable. Incontinent with B and B and had 1 BM this pm. Ate 25% of supper with fair appetite. Turned to sides regularly and heels elevated at all times. Napping in between cares.  P: Continue to encourage oral intake. Increase day activity by transferring pt to chair 2x daily.

## 2018-12-06 NOTE — PLAN OF CARE
"Problem: Patient Care Overview  Goal: Plan of Care/Patient Progress Review  Outcome: Improving  Pt alert and oriented today. Pt still very agitated and frustrated with writer when taking AM meds. Signing and groaning and stating \"you guys just keep smothering me I don't want to take any of these\". When asked why pt stated \"because I don't want to and don't have to\". Writer encouraged and educated pt on the needs for the medications, and pt reluctantly took them. Pt only took one lactulose then purposely dumped the second dose on the floor. Pt was more agreeable to turns and positioning. Up to chair with assist for majority of the day. Later in afternoon pt's behavior and mood improved. Pt ate toast, fruit, and applesauce for lunch without assistance. Watching movies and interactive with staff throughout afternoon. Pt agitated again with writer about afternoon meds. Needed family member to enter room to get compliance. Incontinent of urine and stool. Sacral mepilex on. Continue to monitor.       "

## 2018-12-06 NOTE — PLAN OF CARE
"Problem: Patient Care Overview  Goal: Plan of Care/Patient Progress Review  SLP: Cancel - Attempted to see pt for dysphagia f/u. Despite greater than 10 minutes of max encouragement, pt refusing all PO trials from breakfast tray. When asked why he is refusing to eat/work with ST, pt stated, \"what's the use.\" SLP to f/u per POC.      "

## 2018-12-06 NOTE — PROGRESS NOTES
Met with patient, patient's wife (Yisel), mother-in-law (Faby), Palliative Care SW (Judy), and RNCC (Yisel).  Meeting was originally scheduled in order to discuss goals of care, plans for short and longer term medical cares, and assess patient's desires going forward given recent comments about not wanting to do several medical interventions and his recently varying verbalization about wanting to go to facility vs home. However, Yisel & Faby had several concerns about perceived gaps in care that patient is receiving here and we spent the time instead discussing these.  Their concerns primarily relate to Mono declining PT, food, getting out bed, and other interventions and they perceive that he would do better if he was encouraged more even after declining these things.  I provided empathetic/reflective listening, while also trying to set realistic expectations for what we can do when a patient declines cares/food/etc.  They also voiced concerns that he is not being checked on often enough and that food they have brought for him is not being offered frequently/forcefully enough, though they did also acknowledge that either Yisel or Faby are at Mono's bedside for most of the day.  I encouraged them to offer Mono these foods when they are present and also to push call light when they see an unmet need.  Again attempted to set realistic expectations about frequency of RN rounding and how adamantly/forcefully we can suggest/offer food choices.      Due to extensive discussion of above concerns, we did not have very much time to discuss goals/plans.  Yisel continues to hope that Mono will get strong enough to return home.  She inquired about ARU, and we discussed why Mono is not a good candidate for that.  Also began to discuss concerns that Mono may never regain enough strength to stand/walk independently and that I do anticipate future (and likely more frequent) hospitalizations for  hepatic encephalopathy going forward.  For now, we will continue to proceed with plans for TCU placement once identified, and will continue to address long term goals of care when appropriate.     RNCC Yisel Rodriguez I together spoke with charge nurse to relay family's frustrations and concerns.

## 2018-12-06 NOTE — PROGRESS NOTES
"Social Work Services Progress Note      Hospital Day: 11  Collaborated with:  Primary team P[place] 3, pt's mother in law Faby (ph [phone])      Data:  Pt is a 48-year-old male who presented with encephalopathy and generalized weakness. TCU is recommended at discharge. Pt is medically stable for discharge.      Intervention:  Per RNCC, yesterday at care conference it was established that pt's mother-in-law Faby should be contact for discharge planning. Called and spoke with Faby to obtain additional TCU referrals. Faby spent much time discussing what was discussed at the care conference yesterday, expressing frustration with nursing cares and PT/OT regarding her perception that staff is not encouraging pt enough to participate with therapies and eating. Faby states family is \"really upset about care he's gotten this hospitalization\" and that they have had an \"awful experience,\" and that this experience is influencing their TCU choices as they want him to go somewhere both close to home and with good care.     Faby states she was an NP in nursing facilities and knows which facilities have good care. Faby [facility], [facility], [facility], [facility], and [facility]. Faby requests referrals not be sent to [facility]. Faxed referrals.      Referral status:  1) [facility] (ph [phone])- declined, \"no available placement at this time\"  2) [facility] (ph [phone])- declined, admissions states \"because of his paracentesis,\" admissions states they do not accept pts who may need genia as outpt  3) [facility] (ph [phone])  4) [facility] (ph [phone])  5) [facility] (ph [phone])  6) [facility] (ph [phone])  7) [facility] (ph [phone])      Assessment:  Pursuing TCU placement      Plan:    Anticipated Disposition: " " Facility:  TCU    Barriers to d/c plan:  Placement    Follow Up:  SW to follow for discharge planning      FRANKLYN Felix, LGSW  5A Unit   Pager [phone]  Phone [phone]    Addendum 2:57pm: Received VM from Shavonne at [facility]; she states pt declined as he has been there in the past and \"it did not go well.\" Received call from Asia at [facility]- no beds available.  "

## 2018-12-06 NOTE — PLAN OF CARE
Problem: Patient Care Overview  Goal: Plan of Care/Patient Progress Review  Outcome: No Change  2300 - 0730  BP 98/66 (BP Location: Left arm)  Pulse 62  Temp 96.9  F (36.1  C) (Oral)  Resp 16  Wt 77.1 kg (170 lb)  SpO2 98%  BMI 24.39 kg/m2  VSS on RA - disoriented to situation, was agitated when staff repositioned patient - pt slept most of shift  Denied pain and nausea  Regular diet, no appetite  Port heparin locked  No BM overnight  Mepilex on coccyx  Continue to monitor

## 2018-12-06 NOTE — PROGRESS NOTES
[facility], [place]    Internal Medicine Progress Note - [facility] 3 Service        Assessment & Plan   Mono Varghese is a 48 year old male with history of left parietal astrocytoma s/p resection, ESLD c/b refractory ascites requiring every 2 week genia (removing 8-12 L each time and receiving albumin), hepatic encephalopathy, rectal and esophageal varices and PVT (not currently on anticoagulation medication), and hypothyroidism, who presents with encephalopathy and generalized weakness.      Updates today  - awaiting placement    Goals of Care  Patient & wife very clear about not wanting any extra tubes/lines/etc.  Very interested in meeting with palliative. Mono was not able to participate with palliative due to being altered on 12/2. Palliative will revisit almost daily, will need to visit with wife Yisel over the phone if unable to meet in person. Patient provides on 12/3 that he does not want to return home, though has stated previously when lucid that he would not at all want LTC/TCU placement. After care conference, patient is amenable to TCU placement.      Hepatic Encephalopathy, resolving   Alcoholic cirrhosis c/b refractory ascites, MELD 19  Recently discharged on 11/8/18.  Ammonia very elevated this admission.  Cause of decline unclear as no evidence for infection, decreased lactulose compliance, etc. Albumin, vitamin K challenges completed.  - Lactulose, rifaximin  - Consider therapeuic paracentesis prior to discharge if patient symptomatic from expansion     Generalized weakness  History of seizures  Parietal astrocytoma s/p partial resection, chemo, XRT in 2012 c/b seizures    Seen by neuro earlier this stay.  Some question of NCSE by initial EEG, but neuro felt this is less likely.  Planning to taper off carbamazepine after on keppra for ~1 week.  - MRI head unchanged from prior  - Neurology consulted, appreciate recs; see their note 11/29 for taper plan    JOSEF,  improving, prerenal  Creatinine 1.62 on admission (baseline ~0.8). UA not concerning for infection. Cr bump following addition of home diuretics, but then improved/stabilized.  - Continue home diuretics  - Continue to trend Cr     Severe Malnutrition  - Continue PTA folic acid, methylphenidate, thiamine, D3  - Patient/family have been very clear that would not want feeding tube.  Continue to encourage oral intake.    Pancytopenia  No source of active bleeding.  Noted in heme/onc outpatient notes for past several years, but no clear source/etiology found.  - Continue to trend; consider outpatient follow up pending goals of care    Diet: Regular  Fluids: PO  Lines: PIV, port   Munoz Catheter: not present    DVT Prophylaxis: SCDs given thrombocytopenia  Code Status: DNR/DNI  Expected discharge: Tomorrow, recommended to prior living arrangement vs TCU once mental status at baseline and true functional status evaluated.     The patient's care was discussed with the Attending Physician, Dr. Graves.    Patrick Simon  [facility] 3  Please see sticky note for cross cover information    Interval History    NAEO.  Alert today. Patient is frustrated by his continued hospitalization. When asked about his care conference yesterday, he feels that he was unable to adequately express himself during the conference. I asked him what his goals were for his care, he expressed wanting to get out of the hospital and that he was ok with going to a TCU but unable to explain further.     4 point ROS completed and is negative unless noted above.      Physical Exam   Vital Signs: Temp: 97.2  F (36.2  C) Temp src: Oral BP: 109/64 Pulse: 63 Heart Rate: 65 Resp: 18 SpO2: 98 % O2 Device: None (Room air)    Weight: 170 lbs 0 oz  General Appearance: Sitting up in chair, NAD  Respiratory: CTAB, comfortable on room air, nwob  Cardiovascular: RRR, no m/r/g  GI: mildly distended but non tense.  Non tender.   Skin:  warm, dry, no rashes  Neuro: Oriented to self & location only but otherwise able to answer simple questions.  R sided weakness unchanged.  Able to answer in short phrases, through requires considerable effort.    Internal Medicine Staff Addendum  Date of Service: 12/6/2018  I have seen and examined this patient, reviewed the data and discussed the plan of care. I agree with the above documentation including plan and ddx unless otherwise stated:     #    Adeel Seo MD  Internal Medicine Hospitalist  [facility]  Attending pager: [phone]

## 2018-12-06 NOTE — PLAN OF CARE
Problem: Patient Care Overview  Goal: Plan of Care/Patient Progress Review  Discharge Planner PT  5B  Patient plan for discharge: Did not discuss this date  Current status: Pt tx supine->sit with SBA. Pt tx sit->stand with Min A. 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 Pt took 3 steps to bedside chair with CGA and WW. Pt up in chair at PT exit.  Barriers to return to prior living situation: stairs, medical status, weakness, impaired balance, activity intolerance  Recommendations for discharge: TCU  Rationale for recommendations: Pt would benefit from additional skilled PT to address functional mobility, transfers and maximize IND and reduce caregiver burden.        Entered by: Anisa Jacobs 12/06/2018 12:57 PM

## 2018-12-06 NOTE — PROGRESS NOTES
Calorie Count  Intake recorded for 12/5/2018   Kcals: 259  Protein: 10g  # Meals Recorded: 2 meals ordered; no intake recorded.   25% @ 7pm (per Epic) -- penne w/ meat sauce, peaches, sugar cookie, milk, coffee w/ cream & sugar, broccoli, magic cup  # Supplements Recorded: no intake recorded -- 25% @7pm per Epic lists magic cup; unsure of actual intake

## 2018-12-07 NOTE — PROGRESS NOTES
[facility], [place]   Palliative Care Daily Progress Note          Recommendations, Patient/Family Counseling & Coordination     Recommendations: Patient seen and examined. No family present again today. Discussed patient mood and frustration with staff. He was amenable to ordering breakfast at my encouragement and working with PT today. He offered more conversation again today and it appears that restart of Methylphenidate may have led to brighter mood. Continues to be limited historian due to underlying encephalopathy. Remains able to participate and cooperate in elements of exam.  -Palliative care will continue to follow. Reviewed care conference notes from 12/5. Left message with patient spouse to call me thru the nursing station. Awaiting return call.   Plan includes need to review goals of care in the setting of recurrent hospitalizations and underlying encephalopathy.   -Palliative counseling to follow.  -Agree with ongoing paracentesis for symptom management- was weekly pre admission.  -Patient unable to articulate spiritual needs. Palliative  following as needed.  POLST- incomplete     Thank you for the opportunity to continue to participate in the care of this patient and family.  Please feel free to contact on-call palliative provider with any emergent needs.  We can be reached via team pager [phone] (answered 8-4:30 Monday-Friday); after-hours answering service ([phone])     Kwesi Miller NP ACHANGELO MOTLEY NP  Nurse Practitioner- Lead Advanced Practice Provider  [facility] Palliative Medicine Consult Service   [phone]  TT spent: 30 minutes of which 18 minutes were spent in direct face to face contact with patient/family. Greater than 50% of time spent counseling and/or coordinating care.       Assessment     Diagnoses & symptoms:        48 year old male with history of left parietal astrocytoma s/p resection- (2013), ESLD c/b refractory  ascites requiring  weekly paracentesis (removing 8-12 L each time- receiving albumin), hepatic encephalopathy- on lactulose therapy, rectal and esophageal varices and PVT (not currently on anticoagulation medication), who presented again to hospital on 11/26/2018 with confusion, encephalopathy and generalized weakness. Note prior hospitalizations with admission dates of 11/1/2018 and 10/15/2018. Plan now is TCU placement prior to home return          Interval History:   Improved  interval confusion, less lethargy consistent with waxing and waning pattern of HE.  No tremor. No fever or chills. PT seeing but patient with improved cooperation today. Recommending TCU. Pt interested in restorative efforts. Seems aware prior to going home he needs more therapy time and to get stronger.            Review of Systems:   Palliative Symptom Review (0=no symptom/no concern, 1=mild, 2=moderate, 3=severe):      Pain: 0      Fatigue: 1-2      Nausea: 0          Constipation: 0      Diarrhea: 1 (lactulose therapy)       Depressive Symptoms: 1-2      Anxiety:1-2      Drowsiness: 1      Poor Appetite: 2      Shortness of Breath: 0-1                Medications:   I have reviewed this patient's medication profile and medications given in past 24 hours.        {   Physical exam Vitals: BP 90/55 (BP Location: Left arm)  Pulse 61  Temp 97.8  F (36.6  C) (Oral)  Resp 18  Wt 74.8 kg (164 lb 14.4 oz)  SpO2 99%  BMI 23.66 kg/m2  BMI= Body mass index is 23.66 kg/(m^2).   General appearance: less lethargic, oriented to self as well as family names- remains vague about details of his condition. Sitting in bedside chair. Initiating some conversation. Reaches out to shake my hand when I meet him.  HEENT: Symmetric features, EOMI, sclera mildly icteric. Mucous membranes moist - tongue midline.  Respiratory: Clear to auscultation bilaterally. Comfortable on room air. No wheeze or rales.    Cardiovascular: S1-S2 without murmur.  RRR, rate in  the 60s at rest.  Abdomen: Distended. Brief exam. Overall non focal exam without tenderness. No focal findings other than ascites.  Skin: Jaundice, no evidence for open areas. Port right chest is accessed and site appears clean dry and intact.  Other: Generalized weakness. No resting tremor identified. Slow to respond to questions seemingly fewer disorganized thought patterns.                Data Reviewed:   ROUTINE LABS (Last four results)  BMP    Recent Labs  Lab 12/07/18  0543 12/05/18  0443 12/04/18  0718 12/03/18  0559     --  142  --    POTASSIUM 3.6 3.6 2.9* 3.2*   CHLORIDE 111*  --  114*  --    UCHE 8.1*  --  7.5*  --    CO2 20  --  18*  --    BUN 23  --  24  --    CR 1.04 1.13 1.12 1.33*   GLC 90  --  81  --      CBC    Recent Labs  Lab 12/07/18  0543 12/04/18  0715 12/01/18  0539   WBC 1.6* 1.8* 1.6*   RBC 3.03* 2.89* 2.95*   HGB 9.9* 9.4* 9.6*   HCT 30.0* 28.6* 29.4*   MCV 99 99 100   MCH 32.7 32.5 32.5   MCHC 33.0 32.9 32.7   RDW 14.8 14.7 14.7   PLT 34* 35* 34*     INR    Recent Labs  Lab 12/01/18  0539   INR 1.88*

## 2018-12-07 NOTE — PROGRESS NOTES
Calorie Count  Intake recorded for 12/6/2018   Kcals: 602  Protein: 17g  # Meals Recorded: 100% 1 8oz 1% milk, 8oz apple juice, orange wedges,     # Supplements Recorded: 25% 1 Boost Plus  Food from outside Hospital: 100% 1 Tootsie Roll Pop, 50% bag of Doritos

## 2018-12-07 NOTE — PLAN OF CARE
"Problem: Patient Care Overview  Goal: Plan of Care/Patient Progress Review  Outcome: No Change  Pt oriented to person only. Declined am meds including lactulose. Took afternoon lactulose. One loose stool this shift. [place] protocol-stage 1.     09:50-10:30 Writer and nursing assistant brought pt fresh ice water, then used ceiling lift to transfer pt to recliner chair. Pt very agitated with writer and nursing assistant when asked if needing help to get up to the commode. Pt was educated on safety of using gait belt and walker to prevent falls, but pt still refused gait belt and walker when transferring from recliner chair to commode. pt declined meds and assessment. Writer asked pt \"can you tell me the date?, can you tell me where you are? Can you tell my why you are here?\" pt responded, \"nope\" to each question. Writer explained she was asking questions to assess his orientation and how he is doing.. Writer asked pt, \"Is it that you don't know what the date is or do you not want to talk to me?\"  Pt said, \" I don't want to talk to you?\" Writer said to pt \"I have your morning medications, how do you like to take them? Do you want the lactulose first?\" Pt became agitated and said, \"just give me a minute!\" then continued to eat his icecream.  Writer asked do you want to eat your icecream first and I can come back later to assess you and give you your meds he said \"yes.\"    11:30 pt eating breakfast. Writer asked if he was ready for his am meds and he shook his head no. Writer asked pt if he would like her to come back in 20 minutes to give him his meds and do his assessment and he said nodded yes. Pt is shifting weight while in the chair to relieve pressure on buttocks.       12:00-12:20 pt still eating breakfast.  Writer asked if he was ready for his morning meds now and he said \"no.\" Writer asked  when the pt will be ready for his meds and pt looked at his food and did not answer. Writer performed head to toe " assessment on pt. Pt continues to shift weight and reposition in chair. Writer and nursing assistant stood pt at bedside to help relieve pressure on buttocks with previous existing pressure ulcer. Writer charted that pt refused meds.     12:55 Pt watching TV in chair. Chair alarm on.     13:55-14:30 Pt eating lunch. Writer brought 14:00 lactulose and ritalin for pt and pt took both meds along with am diuretics. Writer and nursing assistant helped pt to commode with gait belt and walker. Pt in a better mood. Mother in law arrived with melva for the pt and wants to talk to SW regarding TCU plans. SW said she will come talk to mother in law.     14:40 PT walking pt in hallway.    15:35 bedside rounding with evening nurse. Helga, the  came to talk to mother in law. Offered pt water and he declined. Asked pt if we could get him anything else and he said no.

## 2018-12-07 NOTE — PROGRESS NOTES
[facility], [place]    Internal Medicine Progress Note - [place] 3 Service        Assessment & Plan   Mono Varghese is a 48 year old male with history of left parietal astrocytoma s/p resection, ESLD c/b refractory ascites requiring every 2 week genia (removing 8-12 L each time and receiving albumin), hepatic encephalopathy, rectal and esophageal varices and PVT (not currently on anticoagulation medication), and hypothyroidism, who presents with encephalopathy and generalized weakness.      Updates today  - awaiting placement    Goals of Care  Patient & wife very clear about not wanting any extra tubes/lines/etc.  Very interested in meeting with palliative. Mono was not able to participate with palliative due to being altered on 12/2. Palliative will revisit almost daily, will need to visit with wife Yisel over the phone if unable to meet in person. Patient provides on 12/3 that he does not want to return home, though has stated previously when lucid that he would not at all want LTC/TCU placement. After care conference, patient is amenable to TCU placement.      Hepatic Encephalopathy, resolving   Alcoholic cirrhosis c/b refractory ascites, MELD 19  Recently discharged on 11/8/18.  Ammonia very elevated this admission.  Cause of decline unclear as no evidence for infection, decreased lactulose compliance, etc. Albumin, vitamin K challenges completed.  - Lactulose, rifaximin  - Consider therapeuic paracentesis prior to discharge if patient symptomatic from expansion     Generalized weakness  History of seizures  Parietal astrocytoma s/p partial resection, chemo, XRT in 2012 c/b seizures    Seen by neuro earlier this stay.  Some question of NCSE by initial EEG, but neuro felt this is less likely.  Planning to taper off carbamazepine after on keppra for ~1 week.  - MRI head unchanged from prior  - Neurology consulted, appreciate recs; see their note 11/29 for taper plan    JOSEF,  improving, prerenal  Creatinine 1.62 on admission (baseline ~0.8). UA not concerning for infection. Cr bump following addition of home diuretics, but then improved/stabilized.  - Continue home diuretics  - Continue to trend Cr     Severe Malnutrition  - Continue PTA folic acid, methylphenidate, thiamine, D3  - Patient/family have been very clear that would not want feeding tube.  Continue to encourage oral intake.    Pancytopenia  No source of active bleeding.  Noted in heme/onc outpatient notes for past several years, but no clear source/etiology found.  - Continue to trend; consider outpatient follow up pending goals of care    Diet: Regular  Fluids: PO  Lines: PIV, port   Munoz Catheter: not present    DVT Prophylaxis: SCDs given thrombocytopenia  Code Status: DNR/DNI  Expected discharge: Tomorrow, recommended to prior living arrangement vs TCU once mental status at baseline and true functional status evaluated.     The patient's care was discussed with the Attending Physician, Dr. Graves.    Patrick Simon  [facility] 3  Please see sticky note for cross cover information    Interval History    NAEO. Patient became angry with nursing staff over night regarding cares. He reports that he was feeling cold overnight, and still felt cold in his room today. I gave him another blanket to cover his legs. He has been afebrile.   4 point ROS completed and is negative unless noted above.      Physical Exam    Vital Signs: Temp: 97.4  F (36.3  C) Temp src: Oral BP: 102/69 Pulse: 63 Heart Rate: 57 Resp: 18 SpO2: 98 % O2 Device: None (Room air)    Weight: 169 lbs 6.4 oz  General Appearance: Sitting up in chair, NAD  Respiratory: CTAB, comfortable on room air, nwob  Cardiovascular: RRR, no m/r/g  GI: mildly distended but non tense.  Non tender.   Skin: warm, dry, no rashes  Neuro: Oriented to self & location only but otherwise able to answer simple questions.  R sided weakness unchanged.  Able  to answer in short phrases, through requires considerable effort.    Labs/Meds/Imaging: Reviewed in [facility]    Internal Medicine Staff Addendum  Date of Service: 12/7/2018    I have seen and examined this patient, reviewed the data and discussed the plan of care. I agree with the above documentation including plan and ddx unless otherwise stated:     #    Adeel Seo MD  Internal Medicine Hospitalist  [facility]  Attending pager: [phone]

## 2018-12-07 NOTE — PLAN OF CARE
Problem: Patient Care Overview  Goal: Plan of Care/Patient Progress Review  Outcome: No Change  2300 - 0700    Bedside rounding done with evening shift nurse Marie at 2330. The patient had his glasses on and appeared to be sleeping comfortably. Glasses removed and placed on bedside table. Patient woke slightly and was told we were changing shift and that Belén would be his nurse until 0730. Patient denied pain. Brief was clean. Declined offer of something to drink.     At 0100 the patient was woken up and told that NST and RN were going to help him change position. Incontinent of urine and stool. Skin was cleansed with dry cloths and Nel cleansing lotion and moisture barrier cream was applied. Scrotum is red and swollen. Two pillow cases used as scrotal sling to elevate scrotum. Pull up re-applied. Non-plastic positioning sheet changed. Turned onto his right side and pillows in use to help maintain position. Pulsate mattress. PCD's are on. The patient was offered apple juice and he said NO loudly. Pillows in place under arms. Port is heparin locked. HOB is at 30 degrees and foot of bed is elevated. The protective mepilex to sacrum did not need to be replaced this time. Patient said 'what gives you the fucking right to do this' after cares were completed. Nurse explained that we were following doctor order for every 2 hour repositioning. Bed alarm is on zone 2 to maintain safety. Seizures pads are in place on side rails.     At 0300 The patient was woken up for repositioning. The patient said 'fuck'. No incontinence. Sling is in place to elevate scrotum. PCD's are on. Nurse offered something to drink and the patient said 'no'. Turned onto his left side and pillows in use to help maintain position. Pulsate mattress. Pillows under arms. HOB at 30 degrees and foot of bed is elevated. Protective mepilex to sacrum is c/d/i. Bed alarm is on zone 2 to maintain safety. Seizures pads are in place on side rails. Once again  the patient asked 'what gives you the right'? Explained that personal care was being provided to help him get better so that he could go home.     0500 The patient was woken up for repositioning. No incontinence, clean and dry.  Sling is in place to elevate scrotum. PCD's are on. Nurse offered something to drink and the patient said 'no'. Turned onto his right side and pillows in use to help maintain position. Pulsate mattress. Pillows under arms. HOB at 30 degrees and foot of bed is elevated. Protective mepilex to sacrum is c/d/i. Bed alarm is on zone 2 to maintain safety. Seizures pads are in place on side rails.    0650 The patient was woken up for repositioning. No incontinence, clean and dry.  Sling is in place to elevate scrotum. PCD's are on. Nurse offered something to drink and the patient said 'no'. Turned onto his left side and pillows in use to help maintain position. Foot of bed elevated to help prevent sliding down and to reduce edema. Pulsate mattress. Pillows under arms. HOB at 30 degrees and foot of bed is elevated. Protective mepilex to sacrum is c/d/i. Bed alarm is on zone 2 to maintain safety. Seizures pads are in place on side rails.     Continue with current plan of nursing care, and update MD with concerns as needed.

## 2018-12-07 NOTE — PLAN OF CARE
Problem: Patient Care Overview  Goal: Plan of Care/Patient Progress Review  PT - per plan established by the Physical Therapist, according to functional mobility the  discharge recommendation is TCU for cont skilled PT to progress functional mobility. Pt had a great day today. Pt needing min A for for all bed mob, and sit to stand to WW for standing support. Pt amb with WW CGA and close W.c follow up to 160'x 2. Pt amb with slow gait pattern and flexed posture but stating that it felt good at end.   Discharge Planner PT   Patient plan for discharge: TCU   Current status: see above.   Barriers to return to prior living situation:fatigue. Weakness.   Recommendations for discharge: TCU   Rationale for recommendations: pt needing cont skilled PT to progress functional mobility.        Entered by: Willis Verdin 12/07/2018 3:49 PM

## 2018-12-07 NOTE — PROGRESS NOTES
"Social Work Services Progress Note      Hospital Day: 12  Collaborated with:  Primary team [place] 3, TCU facilities      Data:  Pt is a 48-year-old male who presented with encephalopathy and generalized weakness. TCU is recommended at discharge. Pt is medically stable for discharge.      Intervention:  Following up on TCU referrals. 2 referrals pending.      Referral status:  1) [facility] (ph [phone])- declined, \"no available placement at this time\"  2) [facility] (ph [phone])- declined, admissions states \"because of his paracentesis,\" admissions states they do not accept pts who may need genia as outpt  3)  [facility] (ph [phone])- declined d/t experience during last stay there  4) [facility] (ph [phone])- declined d/t care needs more than facility can handle, and \"no good discharge plan\"  5) [facility] (ph [phone])- declined, no beds available  6) [facility] (ph [phone])- left  12/7  7) [facility] (ph [phone])- left  12/7      Assessment:  Pursuing TCU placement      Plan:    Anticipated Disposition:  Facility:  TCU    Barriers to d/c plan:  Placement    Follow Up:  SW to follow for discharge planning      FRANKLYN Felix, [facility]  5A Unit   Pager [phone]  Phone [phone]    Addendum 3:35pm: Met with pt's mother-in-law Faby to provide update on TCU referrals. Faby requests additional referral to [facility] and [facility]. Faby states pt's spouse not agreeable with referral that was sent to [facility]. Faxed additional referrals.  "

## 2018-12-07 NOTE — PLAN OF CARE
"Problem: Patient Care Overview  Goal: Plan of Care/Patient Progress Review  Outcome: No Change  Pt was more awake this evening and was sitting up in recliner chair with geomat in the beginning of shift. Slow to answer orientation questions and disoriented to time and situation. More vocal tonight and refuses cares at times. Medication passes and encouraging PO intake were especially frustrating for pt. He would yell at staff to \"leave him the f--- alone.\" Eventually the writer was able to give pt his scheduled medications. Pt is on calorie counts and has a poor appetite. Multiple attempts made to assist pt in eating more and he resisted. Offered ice cream and boost milkshake (he drank this on day shift but he declined)  Denies any pain this shift. Incontinent of stool x 5 this shift. Scrotal redness and edema noted, cleansed area with enrique spray and applied barrier cream. Got pt back to bed assist 1-2 with gait belt and walker and pt went on commode and had a large BM. Pulsate mattress was exchanged by [facility] [employer] personnel this shift d/t a leak and deflation happening. PCDs in place. Will continue to monitor pt status.         "

## 2018-12-08 NOTE — PLAN OF CARE
Problem: Patient Care Overview  Goal: Plan of Care/Patient Progress Review  Pt oriented to self. Slow to respond. Pt states being in [place] and refused to answer time orientation questions. Pt's mother in law and wife came to visit today. Incontinent stool x2. Pt inconsistent with using call light when needed. Pt reminded to use call light after every encounter. 2 assist with walker to commode.  Hourly rounding on patient. VPM and bed alarm on for safety. Low appetite. Spouse ordered pizza for pt to help with appetite. Pleasant and compliant with nursing cares and medications. Pt turned q2h. Will continue to [facility].

## 2018-12-08 NOTE — PLAN OF CARE
Assumed cares at 6037-1748. A/o x 1. VSS on RA. Denies pain. A2 with cares. Q2H turn. Incont Stool x 1. Incont urine x 1. Pt was cooperative but agitated at times, especially during cares. Regular diet- on Long Counts. Per report pt ate pizza for dinner, asked pt if he would like pizza placed in fridge and he declined- At bedside currently.  R Port a Cath- hep locked. VPM in place. Pt was able to sleep for most of shift in between cares.     P: Cont with POC. Per MD note discharge  to TCU vs prior living once mentation back to baseline. Bed alarm in place. VPM in place. Call light within reach. Will continue to monitor.

## 2018-12-08 NOTE — PLAN OF CARE
"Problem: Patient Care Overview  Goal: Plan of Care/Patient Progress Review  Outcome: No Change  Disoriented to place, situation. Calm, cooperative with cares. [place] Protocol-stage 1. Up in chair most of shift. Declined ambulation \"I don't feel like it\" pt said. Repositions independently in chair. Has not voided since 10am this morning, brief dry. Drinking fluids and tonia reg diet. Pt wife at bedside telling pt to take meds. VPM and chair alarm on for safety. Pt calls for help appropriately. Cont to monitor and encourage ambulation.       "

## 2018-12-08 NOTE — PROGRESS NOTES
"Social Work Services Progress Note    Hospital Day: 13  Date of Initial Social Work Evaluation:  Not completed  Consulted with:  Facilities, Patient and Mother in law    Data:  Per chart review patient is admitted with history of left parietal astrocytoma s/p resection, ESLD c/b refractory ascites requiring every 2 week genia (removing 8-12 L each time and receiving albumin), hepatic encephalopathy, rectal and esophageal varices and PVT (not currently on anticoagulation medication), and hypothyroidism, who presents with encephalopathy and generalized weakness.     Intervention:  Update on facilities:    1) [facility] (ph [phone]) - declined, \"no available placement at this time\"  2) [facility] (ph [phone]) - declined, admissions states \"because of his paracentesis,\" admissions states they do not accept pts who may need genia as outpt  3) [facility] (ph [phone]) - declined d/t experience during last stay there  [facility] [phone][facility] - declined d/t care needs more than facility can handle, and \"no good discharge plan\"  5) [facility] (ph [phone] )- declined, no beds available  6) [facility] (ph [phone]) - no beds until earliest 12.12.18  7) [facility] (ph [phone]) - have left 2 messages ([phone]) and no return call  8) [facility] [phone] - no beds available   10) [facility] [phone] - no weekend admissions staff    Spoke with patient in room - he was sitting up in bedside chair and starting to watch a movie - and he encouraged us to call his mother in law. Spoke with Faby [phone] and she would like us to make referrals to the following facilities:    11) [facility] [phone] - declined patient stating too medically complex  12) [facility] [phone] - 2 messages left and referral sent  13) [facility] [phone] - 2 messages left and referral sent  14) [facility] [phone] - 1 messages left, referral sent, phone call to check on status, and re-faxed referral to another number  15) [facility] [phone] - no male beds available until Monday or Tuesday     Assessment:  Patient requiring TCU before returning home with family as caregivers    Plan:    Anticipated Disposition:  Facility:  TCU TBD    Barriers to d/c plan:  Bed availability    Follow Up:  Will await return calls from facilities    Crystal ESTES LICSW  12/8/2018    ON CALL PAGER   0800 - 1600   [phone]    ON CALL COVERAGE AFTER 1600  [phone]

## 2018-12-08 NOTE — PROGRESS NOTES
General acute hospital, [place]    Internal Medicine Progress Note - [place] 3 Service        Assessment & Plan   Mono Varghese is a 48 year old male with history of left parietal astrocytoma s/p resection, ESLD c/b refractory ascites requiring every 2 week genia (removing 8-12 L each time and receiving albumin), hepatic encephalopathy, rectal and esophageal varices and PVT (not currently on anticoagulation medication), and hypothyroidism, who presents with encephalopathy and generalized weakness.      Updates today  - awaiting placement    Goals of Care  Patient & wife very clear about not wanting any extra tubes/lines/etc.  Very interested in meeting with palliative. Mono was not able to participate with palliative due to being altered on 12/2. Palliative will revisit almost daily, will need to visit with wife Yisel over the phone if unable to meet in person. Patient provides on 12/3 that he does not want to return home, though has stated previously when lucid that he would not at all want LTC/TCU placement. After care conference, patient is amenable to TCU placement.      Hepatic Encephalopathy, resolving   Alcoholic cirrhosis c/b refractory ascites, MELD 19  Recently discharged on 11/8/18.  Ammonia very elevated this admission.  Cause of decline unclear as no evidence for infection, decreased lactulose compliance, etc. Albumin, vitamin K challenges completed.  - Lactulose, rifaximin  - Consider therapeuic paracentesis prior to discharge if patient symptomatic from expansion     Generalized weakness  History of seizures  Parietal astrocytoma s/p partial resection, chemo, XRT in 2012 c/b seizures    Seen by neuro earlier this stay.  Some question of NCSE by initial EEG, but neuro felt this is less likely.  Planning to taper off carbamazepine after on keppra for ~1 week.  - MRI head unchanged from prior  - Neurology consulted, appreciate recs; see their note 11/29 for taper plan    JOSEF,  improving, prerenal  Creatinine 1.62 on admission (baseline ~0.8). UA not concerning for infection. Cr bump following addition of home diuretics, but then improved/stabilized.  - Continue home diuretics  - Continue to trend Cr     Severe Malnutrition  - Continue PTA folic acid, methylphenidate, thiamine, D3  - Patient/family have been very clear that would not want feeding tube.  Continue to encourage oral intake.    Pancytopenia  No source of active bleeding.  Noted in heme/onc outpatient notes for past several years, but no clear source/etiology found.  - Continue to trend; consider outpatient follow up pending goals of care    Diet: Regular  Fluids: PO  Lines: PIV, port   Munoz Catheter: not present    DVT Prophylaxis: SCDs given thrombocytopenia  Code Status: DNR/DNI  Expected discharge: Tomorrow, recommended to prior living arrangement vs TCU once mental status at baseline and true functional status evaluated.     The patient's care was discussed with the Attending Physician, Dr. Graves.    Patrick Simon  [facility] 3  Please see sticky note for cross cover information    Interval History    NAEO. Patient had a good night, and was cooperative with cares. We are still awaiting placement at TCUs. The patient's abdomen is distended, but he does not feel like he needs a paracentesis at this time. Will get a para prior to discharge.   4 point ROS completed and is negative unless noted above.      Physical Exam    Vital Signs: Temp: 97.6  F (36.4  C) Temp src: Oral BP: 107/59 Pulse: 61   Resp: 18 SpO2: 98 % O2 Device: None (Room air)    Weight: 164 lbs 14.4 oz  General Appearance: Sitting up in chair, NAD  Respiratory: CTAB, comfortable on room air, nwob  Cardiovascular: RRR, no m/r/g  GI: mildly distended but non tense.  Non tender.   Skin: warm, dry, no rashes  Neuro: Oriented to self & location only but otherwise able to answer simple questions.  R sided weakness unchanged.  Able  to answer in short phrases, through requires considerable effort.    Labs/Meds/Imaging: Reviewed in [facility]    Internal Medicine Staff Addendum  Date of Service: 12/8/2018    I have seen and examined this patient, reviewed the data and discussed the plan of care. I agree with the above documentation including plan and ddx unless otherwise stated:     #    Adeel Seo MD  Internal Medicine Hospitalist  [facility]  Attending pager: [phone]

## 2018-12-08 NOTE — PROGRESS NOTES
Calorie Count  Intake recorded for: 12/7  Total Kcals: 1447 Total Protein: 49g  Kcals from Hospital Food: 727   Protein: 22g  Kcals from Outside Food (avergae):720 Protein: 27g  # Meals Recorded: 2 meals (First - 100% ice cream, grapes, pineapple, peanut butter and jelly toast, 1% milk, orange)    (Second - 3 slices of pepperoni & sausage pizza -- from outside hospital)   # Supplements Recorded: 25% boost

## 2018-12-09 NOTE — PLAN OF CARE
Assumed cares at 0654-1492. A/o x 1. Disoriented to place and situation. Denies pain. VSS- Soft BP at beginning of shift (106/69) (90/48). Sepsis triggered at beginning of shift- Lactic 1.1. Incont stool x 1. Incont urine x 1. Regular diet on Long counts- Encourage PO intake. Q2H turn. R port a cath- Hep locked. Pt pleasant and was able to sleep most of shift, became slightly agitated with cares. [place] 1. Strict I & O- Difficult d/t incont at times. VPM present. Bed alarm placed for pt safety.     P: Cont with POC. Monitor [place] and encourage PO intake. Discharge pending placement at TCU. Call light within reach. Will continue to monitor.

## 2018-12-09 NOTE — PROGRESS NOTES
General acute hospital, [place]    Internal Medicine Progress Note - [place] 3 Service        Assessment & Plan   Mono Varghese is a 48 year old male with history of left parietal astrocytoma s/p resection, ESLD c/b refractory ascites requiring every 2 week genia (removing 8-12 L each time and receiving albumin), hepatic encephalopathy, rectal and esophageal varices and PVT (not currently on anticoagulation medication), and hypothyroidism, who presents with encephalopathy and generalized weakness.      Updates today  - awaiting placement    Goals of Care  Patient & wife very clear about not wanting any extra tubes/lines/etc.  Very interested in meeting with palliative. Mono was not able to participate with palliative due to being altered on 12/2. Palliative will revisit almost daily, will need to visit with wife Yisel over the phone if unable to meet in person. Patient provides on 12/3 that he does not want to return home, though has stated previously when lucid that he would not at all want LTC/TCU placement. After care conference, patient is amenable to TCU placement.      Hepatic Encephalopathy, resolving   Alcoholic cirrhosis c/b refractory ascites, MELD 19  Recently discharged on 11/8/18.  Ammonia very elevated this admission.  Cause of decline unclear as no evidence for infection, decreased lactulose compliance, etc. Albumin, vitamin K challenges completed.  - Lactulose, rifaximin  - Consider therapeuic paracentesis prior to discharge if patient symptomatic from expansion     Generalized weakness  History of seizures  Parietal astrocytoma s/p partial resection, chemo, XRT in 2012 c/b seizures    Seen by neuro earlier this stay.  Some question of NCSE by initial EEG, but neuro felt this is less likely.  Planning to taper off carbamazepine after on keppra for ~1 week.  - MRI head unchanged from prior  - Neurology consulted, appreciate recs; see their note 11/29 for taper plan    JOSEF,  improving, prerenal  Creatinine 1.62 on admission (baseline ~0.8). UA not concerning for infection. Cr bump following addition of home diuretics, but then improved/stabilized.  - Continue home diuretics  - Continue to trend Cr     Severe Malnutrition  - Continue PTA folic acid, methylphenidate, thiamine, D3  - Patient/family have been very clear that would not want feeding tube.  Continue to encourage oral intake.    Pancytopenia  No source of active bleeding.  Noted in heme/onc outpatient notes for past several years, but no clear source/etiology found.  - Continue to trend; consider outpatient follow up pending goals of care    Diet: Regular  Fluids: PO  Lines: PIV, port   Munoz Catheter: not present    DVT Prophylaxis: SCDs given thrombocytopenia  Code Status: DNR/DNI  Expected discharge: Tomorrow, recommended to prior living arrangement vs TCU once mental status at baseline and true functional status evaluated.     The patient's care was discussed with the Attending Physician, Dr. Graves.    Patrick Haneyffy  [facility] 3  Please see sticky note for cross cover information    Interval History    NAEO. Patient had a good night, and was cooperative with cares. We are still awaiting placement at TCUs. The patient's abdomen is distended, but he does not feel like he needs a paracentesis at this time. Patient's wife is at bedside and agrees that he does not need a para right now. Will get a para prior to discharge.   4 point ROS completed and is negative unless noted above.      Physical Exam    Vital Signs: Temp: 98.2  F (36.8  C) Temp src: Axillary BP: 90/47 Pulse: 66 Heart Rate: 62 Resp: 16 SpO2: 98 % O2 Device: None (Room air)    Weight: 159 lbs 3.2 oz  General Appearance: Sitting up in chair, NAD  Respiratory: CTAB, comfortable on room air, nwob  Cardiovascular: RRR, no m/r/g  GI: mildly distended but non tense.  Non tender.   Skin: warm, dry, no rashes  Neuro: Oriented to self &  location only but otherwise able to answer simple questions.  R sided weakness unchanged.  Able to answer in short phrases, through requires considerable effort.    Labs/Meds/Imaging: Reviewed in Epic

## 2018-12-09 NOTE — PLAN OF CARE
Problem: Patient Care Overview  Goal: Plan of Care/Patient Progress Review  Pt oriented to person and place. Pt seemed more alert and awake today. Pt stated he was in the 90's when asked the year. Pt intermittently using call light when needed. Pt reminded to use call light. Calm and cooperative with cares. Pt up in chair for part of shift. Able to transfer back to bed from chair with 1 assist. BM x3 with one incontinent. Up to commode with 1-2 assist with walker. Pt continuing with low appetite. Wife ordering food from outside hospital. Pt ate 25% of supper. Wife at bedside during day. VPM and bed alarm on for safety. Will continue to monitor.      Pt triggered sepsis protocol. BP 83/47. Temp. 97.4 Will recheck vitals per protocol.

## 2018-12-09 NOTE — DOWNTIME EVENT NOTE
The EMR was down for 6.5 hours on 12/9/2018.    Sara Waldrop was responsible for completing the paper charting during this time period.     The following information was re-entered into the system by Sara Waldrop: Intake and output    The following information will remain in the paper chart: N/A    Sara Waldrop  12/9/2018

## 2018-12-09 NOTE — PLAN OF CARE
Pt had a large loose stool this shift. Agitated, but took all meds with encouragement from wife. On [facility] protocol-stage 1. Alert and oriented to self only. Pt was able to get himself sitting up on the side of the bed by himself. Then, walked to chair with walker and assist of 2. VPM, bed alarm on for safety.  Chair alarm off while wife in room. Discharge pending [facility] acceptance. Cont to monitor and with poc.

## 2018-12-09 NOTE — PLAN OF CARE
Discharge Planner SLP   Patient plan for discharge: TCU  Current status: Pt benefits from decreased stimulation in his environment, reduced verbal cueing, and allow time for processing for independent execution of tasks. Pt remains appropriate for regular diet and thin liquids, given the following precautions: 1:1 supervision at meals for use of strategies, alternate solids and liquids, small bites and sips. SLP to follow for strategy use.  Barriers to return to prior living situation: Requires min-mod assistance for safe PO intake and use of strategies.  Recommendations for discharge: TCU  Rationale for recommendations: Pt will benefit from ongoing SLP services to address the above outlined deficits.        Entered by: Jennifer Amezcua 12/09/2018 11:53 AM

## 2018-12-10 NOTE — PLAN OF CARE
Pt oriented to self and intermittently place. Up in chair for part of evening. Able to walk to bed with 1 ass. And walker. Pt compliant with cares and using call light appropriately. Took all scheduled medications. VPM discontinued this evening. Pt on BA for safety. No appetite for supper. BM x2. One incontinence. Will continue to monitor.

## 2018-12-10 NOTE — PLAN OF CARE
Assumed cares at [id number]. A/o x 1- self only. A1-2 with cares. VSS on RA. Q2H turn. Incont stool x 1. Incont urine x 2. R port a cath re accessed during night d/t dressing falling out and scheduled re access date 12/11- Currently SL and dressing C/D/I.  Denies pain. VPM D/c'd on previous shift. Bed alarm in place, but pt using call light appropriately and no episodes of trying to get out of bed during night. [place] 1. Coccyx Mepilex changed during night. Distended Abd.     P: Cont with POC. Monitor [place]. Discharge pending TCU placement. Per MD note, Para to be completed prior to discharge. Call light within reach. Will continue to monitor.     Addendum: [place] and PRN Lactulose discontinued this AM.

## 2018-12-10 NOTE — PLAN OF CARE
Discharge Planner PT   Patient plan for discharge: TCU  Current status: pt Lu[place] bed mobility, SBA sit<>stand to FWW and stand pivot transfers to commode. He is totalA for pericares and brief management with commode use with cues for standing when standing. Pt ambulates 2x150' with FWW CGA and wheelchair follow, extensive cues for gait throughout improve technique.  Barriers to return to prior living situation: weakness, reduced activity tolerance, impaired balance  Recommendations for discharge: TCU  Rationale for recommendations: Pt currently below baseline level of function and would benefit from ongoing therapy to address the above deficits in order to progress towards PLOF and promote IND mobility.         Entered by: Vidya Kelley 12/10/2018 5:24 PM

## 2018-12-10 NOTE — PROGRESS NOTES
"Social Work Services Progress Note     Hospital Day: 16  Consulted with:  Primary team Piedad Warren, TCU facilities     Data:  Pt is a 48-year-old male who presented with encephalopathy and generalized weakness. TCU is recommended. Pt is medically stable for discharge.     Intervention:  Following-up on TCU referrals.     1) [facility] (ph [phone]) - declined, \"no available placement at this time\"  2) [facility] (ph [phone]) - declined, admissions states \"because of his paracentesis,\" admissions states they do not accept pts who may need genia as outpt  3) [facility] [phone][facility] - declined d/t experience during last stay there  [facility] [phone][facility] - declined d/t care needs more than facility can handle, and \"no good discharge plan\"  5) [facility] (ph [phone] )- declined, no beds available  6) [facility] (ph [phone]) - no beds available  7) [facility] (ph [phone])- declined, do not feel pt is appropriate for TCU  8) [facility] (ph [phone]) - no beds available   10) [facility] (ph [phone])- left  12/10  11) [facility] (ph [phone]) - declined patient stating too medically complex  12) [facility] (ph [phone])- declined d/t need for genia, intermittent agitation, and refusing cares  13) [facility] (ph [phone])- admissions has referral, not yet assessed  14) [facility] (ph [phone])- declined, no appropriate bed d/t high level of care needs  15)  [facility] (ph [phone])- left  12/10     Assessment:  Patient requiring TCU before returning home with family as caregivers     Plan:    Anticipated Disposition:  Facility:  TCU TBD    Barriers to d/c plan:  Placement    Follow Up:  SW to follow for discharge planning    FRANKLYN Felix, [facility]  5A Unit   Pager [phone]  Phone [phone]        "

## 2018-12-10 NOTE — PROGRESS NOTES
"CLINICAL NUTRITION SERVICES - REASSESSMENT NOTE     Nutrition Prescription    RECOMMENDATIONS FOR MDs/PROVIDERS TO ORDER:  Pending patient / family wishes, would recommend TF support to optimize status    Malnutrition Status:    Severe malnutrition in the context of chronic condition     Recommendations already ordered by Registered Dietitian (RD):  Will continue with Boost plus and magic cup with meals     Future/Additional Recommendations:  None       EVALUATION OF THE PROGRESS TOWARD GOALS   Diet: Regular diet + Boost plus + Magic cup  Intake: Patient sound asleep again during my visit. Lunch tray at bedside and untouched.   --Per RN/flow sheet: patient is consuming 25-% of 3 meals per day. Patient has been having take out meals at night and eating a fair amount at night per chart notes.     Calorie count:   12/7: 1447 kcal and 49 gm protein from 2 meals and 25% boost supplement.  12/6: 602 kcal and 17 gm protein from snacks ands boost supplement recorded. (insufficient data)  12/5: 259 kcal and 10 gm protein from 25% of dinner recorded. ( insufficient data)  Average 3 day intake: 770 kcal and 25 gm protein.  Met 43% estimated kcal and 35% estimated protein intake.       NEW FINDINGS   Awaiting replacement TCU  HE: resolved per chart note, Etoh cirrhosis with refractory Ascites and MELD of 19    Per providers note: \"Patient/family have been very clear that would not want feeding tube. Continue to encourage oral intake\"      MALNUTRITION  % Intake: </= 50% for >/= 5 days (severe) per calorie count data   % Weight Loss: Unable to assess due to volume fluctuation with Ascites / paracentesis   Subcutaneous Fat Loss: all severe   Muscle Loss: Facial & jaw region, Scapular bone and Upper leg (quadricep, hamstring): all severe   Fluid Accumulation/Edema: has a large Ascites   Malnutrition Diagnosis: Severe malnutrition in the context of chronic condition     Previous Goals   Patient to consume % of " nutritionally adequate meal trays TID, or the equivalent with supplements/snacks.  Evaluation: Not met    Previous Nutrition Diagnosis  Inadequate oral intake likely related to fluctuating mental status and early satiety with associated ESLD, as evidenced by patient consuming mostly 25% of meals, evidence of severe fat/ muscle wasting     Evaluation: No change    CURRENT NUTRITION DIAGNOSIS  Inadequate oral intake likely related to fluctuating mental status, early satiety and deconditioning all associated with ESLD, as evidenced by patient consuming variable intake, consuming <50% of needs per calorie count data and evidence of severe fat/ muscle wasting.      INTERVENTIONS  Implementation  Medical food supplement therapy: Will continue with oral supplements     Goals  Patient to consume % of nutritionally adequate meal trays TID, or the equivalent with supplements/snacks.    Monitoring/Evaluation  Progress toward goals will be monitored and evaluated per protocol.    Rick Malik RD/JOSELO  Pager [phone]

## 2018-12-10 NOTE — PROGRESS NOTES
[facility], [place]    Internal Medicine Progress Note - [place] 3 Service        Assessment & Plan   Mono Varghese is a 48 year old male with history of left parietal astrocytoma s/p resection, ESLD c/b refractory ascites requiring every 2 week genia (removing 8-12 L each time and receiving albumin), hepatic encephalopathy, rectal and esophageal varices and PVT (not currently on anticoagulation medication), and hypothyroidism, who presents with encephalopathy and generalized weakness.      Updates today  - awaiting placement    Goals of Care  Patient & wife very clear about not wanting any extra tubes/lines/etc.  Very interested in meeting with palliative. Mono was not able to participate with palliative due to being altered on 12/2. Palliative will revisit almost daily, will need to visit with wife Yisel over the phone if unable to meet in person. Patient provides on 12/3 that he does not want to return home, though has stated previously when lucid that he would not at all want LTC/TCU placement. After care conference, patient is amenable to TCU placement.      Hepatic Encephalopathy, resolved  Alcoholic cirrhosis c/b refractory ascites, MELD 19  Recently discharged on 11/8/18.  Ammonia very elevated this admission.  Cause of decline unclear as no evidence for infection, decreased lactulose compliance, etc. Albumin, vitamin K challenges completed.  - Lactulose, rifaximin  - Consider therapeuic paracentesis prior to discharge if patient symptomatic from expansion     Generalized weakness  History of seizures  Parietal astrocytoma s/p partial resection, chemo, XRT in 2012 c/b seizures    Seen by neuro earlier this stay.  Some question of NCSE by initial EEG, but neuro felt this is less likely.  Planning to taper off carbamazepine after on keppra for ~1 week.  - MRI head unchanged from prior  - Neurology consulted, appreciate recs; see their note 11/29 for taper plan    JOSEF,  resolved, prerenal  Creatinine 1.62 on admission (baseline ~0.8). UA not concerning for infection. Cr bump following addition of home diuretics, but then improved/stabilized.  - Continue home diuretics  - Continue to trend Cr intermittently     Severe Malnutrition  - Continue PTA folic acid, methylphenidate, thiamine, D3  - Patient/family have been very clear that would not want feeding tube.  Continue to encourage oral intake.    Pancytopenia  No source of active bleeding.  Noted in heme/onc outpatient notes for past several years, but no clear source/etiology found.  - Continue to trend; consider outpatient follow up pending goals of care    Diet: Regular  Fluids: PO  Lines: PIV, port   Munoz Catheter: not present    DVT Prophylaxis: SCDs given thrombocytopenia  Code Status: DNR/DNI  Expected discharge: 2 - 3 days, recommended to prior living arrangement vs TCU once mental status at baseline and true functional status evaluated.     The patient's care was discussed with the Attending Physician, Dr. Seo.    Teofilo Hamm  [facility] 3  Please see sticky note for cross cover information    Interval History    NAEO. Denies complaints this AM.  Mostly cooperative with cares.  VPM stopped.  Declines offer of paracentesis today.  PO intake variable, requires encouragement.  No n/v.    4 pt ROS negative except as above.    Physical Exam    Vital Signs: Temp: 96.6  F (35.9  C) Temp src: Axillary BP: 108/68 Pulse: 70 Heart Rate: 65 Resp: 16 SpO2: 98 % O2 Device: None (Room air)    Weight: 163 lbs 6.4 oz  General Appearance: Sitting up in chair, NAD  Respiratory: CTAB, comfortable on room air, nwob  Cardiovascular: RRR, no m/r/g  GI: Slightly more distended but non tense.  Non tender.   Skin: warm, dry, no rashes  Neuro: Answers in short phrases.  More alert today.      Labs/Meds/Imaging: Reviewed in [facility]      Internal Medicine Staff Addendum  Date of Service: 12/10/2018    I have seen  and examined this patient, reviewed the data and discussed the plan of care. I agree with the above documentation including plan and ddx unless otherwise stated:     #    Adeel Seo MD  Internal Medicine [facility]  Attending pager: [phone]

## 2018-12-10 NOTE — PROGRESS NOTES
"[facility] Nurse Inpatient Pressure Injury Assessment   Reason for consultation: Evaluate and treat Sacral pressure injury      ASSESSMENT  Pressure Injury: on BL sacrum , hospital acquired. Pt has a couple scratches to inner buttock area, however pressure injury has resolved. Pt would still benefit from preventative mepilex.   Pressure Injury is Stage Deep Tissue Pressure Injury (DTPI)   Contributing factor of the pressure injury: pressure and shear  Status: Resolved 12/10/18     TREATMENT PLAN  Sacral wound: Every 3 days and Weekly   1. Clean wound with saline or MicroKlenz Spray, pat dry  2. Paint periwound tissue with No Sting Skin Prep (#[id number]) and allow to dry thoroughly  3. Press a Mepilex  Sacral Dressing (PS#[id number])  to the area, making sure to conform nicely to skin curvatures   4. Time and date dressing change and srinivas with a \"P\" for Prevention of a wound  5. Reposition pt every 1 to 2 hours when in bed and hourly when up to the chair to relieve pressure and promote perfusion to tissue  NOTE- continue to assess under dressing and document findings BID, per protocol    Pressure Injury Prevention (PIP) MEASURES:  1. If pt is refusing to turn or reposition they must be educated on the potential of injury due to not off loading.  Then this \"educated refusal\" needs to be documented as an \"educated refusal to turn/ reposition\" and document if alert, etc. Additionally, you MUST notify the charge nurse, nurse manager and the provider of the pt's refusal to reposition.  2. Follow Luis Risk recommendations  ? Nutrition following?  ? Moisture and Incontinence issues addressed?    3. CHAIR POSITIONING:  REPOSITIONING   Fully off load every 1-2 hours (stand x 5 minutes or back to bed)   Shift side to side every 15 minutes  Chair cushion ([id number]) when up to chair (pillow does not actually off load)  NOTE: the Z-Flow Pad is NOT a cushion, pt should NOT sit on the Z-Flow pads  4.  BED:    POSTIONING  ? No direct supine " positioning, position only side to side  ? Try to keep HOB below 30 degrees  ? Reposition every 1-2 hours  ? Consider Z-Nik Pillows to help keep pt on side (#810848-medium or #[id number]- large). *Z-Flows are for positioning, DO NOT SIT ON!  ? Keep heels elevated- one pillow under each leg from knee to heels, checking heels are free    Orders Written  WOC Nurse follow-up plan:signing off  Nursing to notify the Provider(s) and re-consult the WOC Nurse if wound(s) deteriorates or new skin concern.    Patient History  According to provider note(s):  Mono Varghese is a 48 year old male with history of left parietal astrocytoma s/p resection, ESLD c/b refractory ascites requiring every 2 week genia (removing 8-12 L each time and receiving albumin), hepatic encephalopathy, rectal and esophageal varices and PVT (not currently on anticoagulation medication), and hypothyroidism, who presents with encephalopathy and generalized weakness.      Objective Data  Containment of urine/stool: Diaper and Incontinence Protocol    Orders Placed This Encounter      Regular Diet Adult Thin Liquids (water, ice chips, juice, milk, gelatin, ice cream, etc) (with 1:1, supervision)    I/O last 3 completed shifts:  In: 680 [P.O.:680]  Out: 250 [Other:250]     Luis Score  Av.8  Min: 12  Max: 18       Labs:   Recent Labs   Lab 18  0543   HGB 9.9*   WBC 1.6*       Physical Exam  Skin inspection: focused sacral/buttock      Wound Location:  BL sacrum    Wound Photo 12/3/18  Wound history: Pt is on lactulose with frequent diarrhea stools, increased confusion and nurses report sitting up more in bed. Nursing has implemented mepilex and ordered a pulsate mattress  Wound Description: Pt has mild congestion to BL buttock with brownish/grey discoloration that is now blanchable. Pt has a couple scratch marks that are scabbed to inner buttock.      Interventions  Current support surface: Standard  Low air loss mattress  Current off-loading  measures: Pillows under calves  Repositioning aid: Turn and Position System, Seated Positioning System and Pillows  Visual inspection of wound(s) completed   Wound Care: was done per plan of care.  Supplies: placed at the bedside  Educated provided: importance of repositioning and plan of care  Education provided to: patient  and nurse  Discussed importance of:repositioning every 2 hours, off-loading pressure to wound, their role in pressure injury prevention, head elevation <30 degrees and off-loading mattress  Discussed plan of care with Patient and Nurse    Abdelrahman Peoples RN

## 2018-12-10 NOTE — PLAN OF CARE
Patient A&O to self and situation at times. Follows most commands and answers questions appropriately with yes or no answers. Patient refused to participate with PT in the late morning, but later agreed to work with PT around  1445. Patient incontinent of urine x2. No BM. Given scheduled lactulose dose x2. Up in chair in the morning and eating small bites from breakfast. Patient refused majority of lunch tray and ate only a small amount of orange slices. Pressure ulcer assessed by WOC RN and mepilex changed by writer. Patient awaiting TCU placement. Continue POC.

## 2018-12-11 NOTE — PROGRESS NOTES
"Palliative Care Inpatient Clinical Social Work Follow Up Visit:    Patient Information: Mono Varghese is a 48 year old male with history of left parietal astrocytoma s/p resection, ESLD c/b refractory ascites requiring every 2 week genia (removing 8-12 L each time and receiving albumin), hepatic encephalopathy, rectal and esophageal varices and PVT (not currently on anticoagulation medication), and hypothyroidism, who presents with encephalopathy and generalized weakness.       Reason for Palliative Care Consultation: Patient and family support     Visited With: Patient and Family member(s) Mother in Law Faby    Summary of Visit: I met with Faby and Mono today. Mono was sitting up in the chair eating lunch. He was only able to engage minimally during the visit. Faby stated that things have been getting better with his care and that she and Mono's wife feel there has been improvement with staff attempting to motivate Mono for cares. She also noted several times that she and her daughter don't want to \"give up\" on Mono and let him die.     Assessment: Faby spent time exploring how it has been as a caregiver to Mono and worry about giving up on him vs holding guilt that she cant do more for him. She shared good insight into her experience with a friends death and the pressure the caregiver felt to \"keep going\".      Relevant Symptoms/Concerns: Mono has confusion and is unable to make his needs known.      Strengths: Faby and Yisel are strong supports for Mono and have been caring for him at home full time.     Goals: Mono's goal is to get back home.      Clinical Social Work Interventions Utilized: Assessment of palliative specific issues, Facilitation of processing of thoughts/feelings and Reframing    Coordinated With: ARMEN    Plan and Recommendations: I will follow up with Mono and family at the end of the week if they are still in the hospital. They are currently waiting for a bed to " become available at [facility].     Judy ESTES, ERNST  Palliative Care Social Work Fellow  Pager: [phone]    [facility] Inpatient Team Consult pager [phone] (M-F 8-4:30)  After-hours Answering Service [phone]

## 2018-12-11 NOTE — PROGRESS NOTES
"Social Work Services Progress Note     Hospital Day: 17  Consulted with:  Primary team Piedad 3, TCU facilities, pt's mother-in-law Faby     Data:  Pt is a 48-year-old male who presented with encephalopathy and generalized weakness. TCU is recommended. Pt is medically stable for discharge.     Intervention:  Following-up on TCU referrals. Pt clinically accepted at [facility] but they are unsure of bed availability and will call back. Met with pt and pt's mother-in-law to provide update. Pt's mother-in-law pleased with potential placement at [place] and requests wheelchair van ride at discharge.     1) [facility] (ph [phone]) - declined, \"no available placement at this time\"  2) [facility] (ph [phone]) - declined, admissions states \"because of his paracentesis,\" admissions states they do not accept pts who may need genia as outpt  3) [facility] (ph [phone]) - declined d/t experience during last stay there  4) [facility] (ph [phone]) - declined d/t care needs more than facility can handle, and \"no good discharge plan\"  5) [facility] (ph [phone] )- declined, no beds available  6) [facility] (ph [phone]) - no beds available  7) [facility] (ph [phone])- declined, do not feel pt is appropriate for TCU  8) [facility] (ph [phone]) - no beds available   10) [facility] (ph [phone])- \"no available bed\"  11) [facility] (ph [phone]) - declined patient stating too medically complex  12) [facility] (ph [phone])- declined d/t need for genia, intermittent agitation, and refusing cares  13) [facility] (ph [phone])- declined d/t \"hesitancy\" by DON, no reason given  14) [facility] (ph [phone])- declined, no appropriate bed d/t high level of care needs  15) [facility] ( [phone])- clinically accepted, unsure of bed availability     Assessment:  Patient requiring TCU " before returning home with family as caregivers     Plan:    Anticipated Disposition:  Facility:  TCU TBD    Barriers to d/c plan:  Placement    Follow Up:  SW to follow for discharge planning     FRANKLYN Felix, [facility]  5A Unit   Pager [phone]  Phone [phone]

## 2018-12-11 NOTE — PLAN OF CARE
Discharge Planner PT   Patient plan for discharge: TCU  Current status: pt SBA sit<>stand to FWW and amb 2x50' CGA, very slow gait with cues needed throughout for R LE advancement and posture. Standing training performed to improved posture with FWW. Lengthy discussion with pt about participating in therapy in order to prevent LTC and progress mobility at TCU, pt is motivated and wants to improve mobility but needs encouragement. Now that pt is progressing with mobility, will have OT initiate tomorrow to progress ADLs.  Barriers to return to prior living situation: weakness, reduced activity tolerance, falls risk, cognition  Recommendations for discharge: TCU  Rationale for recommendations: Pt currently below baseline level of function and would benefit from ongoing therapy to address the above deficits in order to progress towards PLOF and promote IND mobility.         Entered by: Vidya Kelley 12/11/2018 12:45 PM

## 2018-12-11 NOTE — PROGRESS NOTES
[facility], [place]   Palliative Care Daily Progress Note          Recommendations, Patient/Family Counseling & Coordination     Recommendations: Patient seen and examined. Mother in law present today for interview and exam. Family and staff encouraging increased participation in functional rehab therapies with mixed results. Mono continues to offer conversation again, ongoing Methylphenidate may be contributing to brighter mood. Continues to be limited historian due to underlying encephalopathy. Remains able to participate and cooperate in elements of exam.  -Palliative care will continue to follow. Reviewed care conference notes from 12/5.   Will try again to speak with Yisel-patient spouse.   Plan includes need to review goals of care in the setting of recurrent hospitalizations and underlying encephalopathy.   -Palliative counseling following.  -Agree with ongoing paracentesis for symptom management- was weekly pre admission.  - Palliative  following as needed.  POLST- incomplete     Thank you for the opportunity to continue to participate in the care of this patient and family.  Please feel free to contact on-call palliative provider with any emergent needs.  We can be reached via team pager [phone] (answered 8-4:30 Monday-Friday); after-hours answering service ([phone])     Kwesi Miller, JOHN ACHANGELO Miller APRN NP  Nurse Practitioner- Lead Advanced Practice Provider  [facility] Palliative Medicine Consult Service   [phone]  TT spent: 30 minutes of which 22 minutes were spent in direct face to face contact with patient/family. Greater than 50% of time spent counseling and/or coordinating care.       Assessment     Diagnoses & symptoms:        48 year old male with history of left parietal astrocytoma s/p resection- (2013), ESLD c/b refractory ascites requiring  weekly paracentesis (removing 8-12 L each time- receiving albumin), hepatic  encephalopathy- on lactulose therapy, rectal and esophageal varices and PVT (not currently on anticoagulation medication), who presented again to hospital on 11/26/2018 with confusion, encephalopathy and generalized weakness. Note prior hospitalizations with admission dates of 11/1/2018 and 10/15/2018. Plan now is TCU placement prior to home return          Interval History:   Improved overall confusion, less lethargy but in keeping and consistent with waxing and waning pattern of HE.  No tremor. No fever or chills. PT seeing but patient with improved cooperation overall. Recommending TCU. Pt interested in restorative efforts. Seems aware prior to going home he needs more therapy time and to get stronger.            Review of Systems:   Palliative Symptom Review (0=no symptom/no concern, 1=mild, 2=moderate, 3=severe):      Pain: 0      Fatigue: 1      Nausea: 0          Constipation: 0      Diarrhea: 1 (lactulose therapy)       Depressive Symptoms: 1      Anxiety:1      Drowsiness: 1      Poor Appetite: 2      Shortness of Breath: 0-1                Medications:   I have reviewed this patient's medication profile and medications given in past 24 hours.        {   Physical exam Vitals: /67 (BP Location: Left arm)   Pulse 72   Temp 97.3  F (36.3  C) (Oral)   Resp 20   Wt 73.8 kg (162 lb 12.8 oz)   SpO2 95%   BMI 23.36 kg/m    BMI= Body mass index is 23.36 kg/m .   General appearance: less lethargic, oriented to self as well as family names- remains vague about details of his condition. Sitting in bedside chair. Initiating some conversation. Reaches out to shake my hand when I am leaving.  HEENT: Symmetric features, EOMI, sclera mildly icteric. Mucous membranes moist - tongue midline.  Respiratory: Clear to auscultation bilaterally. Comfortable on room air. No wheeze or rales.    Cardiovascular: S1-S2 without murmur.  RRR, rate in the 70s at rest.  Abdomen: Distended. Brief exam. Overall non focal exam  without tenderness. No focal findings other than ++ ascites.  Skin: Jaundice, no evidence for open areas. Port right chest is accessed and site appears clean dry and intact.  Other: Generalized weakness. No resting tremor identified. Slow to respond to questions seemingly fewer disorganized thought patterns.                Data Reviewed:   ROUTINE LABS (Last four results)  BMP  Recent Labs   Lab 12/07/18  0543 12/05/18  0443     --    POTASSIUM 3.6 3.6   CHLORIDE 111*  --    UCHE 8.1*  --    CO2 20  --    BUN 23  --    CR 1.04 1.13   GLC 90  --         Lab Test 12/01/18  0539   PROTTOTAL 6.1*   ALBUMIN 3.8   BILITOTAL 1.4*   ALKPHOS 199*   AST 38   ALT 30     CBC  Recent Labs   Lab 12/07/18  0543   WBC 1.6*   RBC 3.03*   HGB 9.9*   HCT 30.0*   MCV 99   MCH 32.7   MCHC 33.0   RDW 14.8   PLT 34*     INR  No lab results found in last 7 days.

## 2018-12-11 NOTE — PLAN OF CARE
Pt alert and oriented to place, person, and situation; forgetful about time. Vitally stable on ra. Up with assist of one, can get up to commode. Cooperative with cares. Denies pain. Had one incontinent void and stool. Chest port HL. Slept through the night. No significant changes in status. BA active for intermittent confusion. Continue to monitor and implement.

## 2018-12-11 NOTE — PLAN OF CARE
OT 5B. Cancel. Upon PM attempt, pt refusing to work with therapy. Will reschedule OT evaluation for tomorrow.

## 2018-12-11 NOTE — PLAN OF CARE
Pt oriented to self and place. Needs time to answer. VSS on RA. Pt walked leahy with PT today. Calm and compliant with nursing cares and medications. Incontinent BM x2. Pt states he is not always aware he has had a BM. Intermittently using call light. Continuing with low appetite. Ate some food from home and ice cream for dinner. Port-a-cath heparin locked. Will continue to monitor.        Patient Care Overview  Plan of Care/Patient Progress Review  12/10/2018 1449 by Adeel Pena, RN  Flowsheets  Taken 12/8/2018 2117 by Shannon Buitrago RN  Progress  improving (Pended)  Taken 12/10/2018 1449 by Adeel Pena, RN  Plan of Care Reviewed With  patient

## 2018-12-11 NOTE — PLAN OF CARE
Pt A&O x2-3, confused on situation at times, slow to respond, but follows all commands. Patient performed multiple sit to stands, walked to chair from bed, and ambulated short distance with PT. Patient given two doses of scheduled lactulose and was incontinent of stool x3. Patient also incontinent of urine. Appetite improved and patient ate 75% of breakfast and lunch. Mother in law at bedside and met with palliative and SW. Dinner order placed. Patient medically stable and awaiting TCU/rehab placement. Continue POC.

## 2018-12-11 NOTE — PROGRESS NOTES
[facility], [place]    Internal Medicine Progress Note - [place] 3 Service        Assessment & Plan   Mono Varghese is a 48 year old male with history of left parietal astrocytoma s/p resection, ESLD c/b refractory ascites requiring every 2 week genia (removing 8-12 L each time and receiving albumin), hepatic encephalopathy, rectal and esophageal varices and PVT (not currently on anticoagulation medication), and hypothyroidism, who presents with encephalopathy and generalized weakness.       Updates today  - awaiting placement    Goals of Care  Patient & wife very clear about not wanting any extra tubes/lines/etc.  Very interested in meeting with palliative. Mono was not able to participate with palliative due to being altered on 12/2. Palliative will revisit almost daily, will need to visit with wife Yisel over the phone if unable to meet in person. Patient provides on 12/3 that he does not want to return home, though has stated previously when lucid that he would not at all want LTC/TCU placement. After care conference, patient is amenable to TCU placement.      Hepatic Encephalopathy, resolved  Alcoholic cirrhosis c/b refractory ascites, MELD 19  Recently discharged on 11/8/18.  Ammonia very elevated this admission.  Cause of decline unclear as no evidence for infection, decreased lactulose compliance, etc. Albumin, vitamin K challenges completed.  - Lactulose, rifaximin  - Consider therapeuic paracentesis prior to discharge if patient symptomatic from expansion     Generalized weakness  History of seizures  Parietal astrocytoma s/p partial resection, chemo, XRT in 2012 c/b seizures    Seen by neuro earlier this stay.  Some question of NCSE by initial EEG, but neuro felt this is less likely.  Planning to taper off carbamazepine after on keppra for ~1 week.  - MRI head unchanged from prior  - Neurology consulted, appreciate recs; see their note 11/29 for taper plan    JOSEF,  resolved, prerenal  Creatinine 1.62 on admission (baseline ~0.8). UA not concerning for infection. Cr bump following addition of home diuretics, but then improved/stabilized.  - Continue home diuretics  - Continue to trend Cr intermittently     Severe Malnutrition  - Continue PTA folic acid, methylphenidate, thiamine, D3  - Patient/family have been very clear that would not want feeding tube.  Continue to encourage oral intake.    Pancytopenia  No source of active bleeding.  Noted in heme/onc outpatient notes for past several years, but no clear source/etiology found.  - Continue to trend; consider outpatient follow up pending goals of care    Diet: Regular  Fluids: PO  Lines: PIV, port   Munoz Catheter: not present    DVT Prophylaxis: SCDs given thrombocytopenia  Code Status: DNR/DNI  Expected discharge: 2 - 3 days, recommended to prior living arrangement vs TCU once mental status at baseline and true functional status evaluated.     The patient's care was discussed with the Attending Physician, Dr. Seo.    Patrick Haneyffy  [facility] 3  Please see sticky note for cross cover information    Interval History    NAEO. Denies complaints this AM.  Mostly cooperative with cares.  VPM stopped.  Declines offer of paracentesis today.  PO intake variable, requires encouragement.  No n/v.    4 pt ROS negative except as above.    Physical Exam    Vital Signs: Temp: 98.3  F (36.8  C) Temp src: Oral BP: 114/77 Pulse: 72 Heart Rate: 60 Resp: 16 SpO2: 96 % O2 Device: None (Room air)    Weight: 162 lbs 12.8 oz  General Appearance: Lying comfortably in bed, NAD  Respiratory: CTAB, comfortable on room air, nwob  Cardiovascular: RRR, no m/r/g  GI: Slightly more distended but non tense.  Non tender.   Skin: warm, dry, no rashes  Neuro: Answers in short phrases.  More alert today.      Labs/Meds/Imaging: Reviewed in Epic

## 2018-12-12 NOTE — PROGRESS NOTES
[facility], [place]   Palliative Care Daily Progress Note          Recommendations, Patient/Family Counseling & Coordination     Recommendations: Patient seen and examined. Mother in law again present today for interview and exam. He has been accepted at a TCU in [facility] for ongoing functional rehab and management of HE. Family and staff continue to encourage increased participation in functional rehab therapies with mixed results. Mono continues to offer conversation-  ongoing Methylphenidate likely contributing to brighter mood. Continues to be limited historian due to underlying encephalopathy. Remains able to participate and cooperate in elements of exam.  -Palliative care will follow as outpt if family desires for ongoing goals of care discussion.  - (NOTE: I left messages for spouse to call me without reply on several occasions)   -Agree with ongoing paracentesis for symptom management- was weekly pre admission. Now has been 3+ weeks with patient refusal for the past several days    POLST- incomplete- Is DNR/DNI     Thank you for the opportunity to continue to participate in the care of this patient and family.  Please feel free to contact on-call palliative provider with any emergent needs.  We can be reached via team pager [phone] (answered 8-4:30 Monday-Friday); after-hours answering service ([phone])     Kwesi Miller, JOHN ACHPN  Kwesi MOTLEY NP  Nurse Practitioner- Lead Advanced Practice Provider  [facility] Palliative Medicine Consult Service   [phone]  TT spent: 28 minutes of which 15 minutes were spent in direct face to face contact with patient/family. Greater than 50% of time spent counseling and/or coordinating care.       Assessment     Diagnoses & symptoms:        48 year old male with history of left parietal astrocytoma s/p resection- (2013), ESLD c/b refractory ascites requiring  weekly paracentesis (removing 8-12 L each time- receiving  albumin), hepatic encephalopathy- on lactulose therapy, rectal and esophageal varices and PVT (not currently on anticoagulation medication), who presented again to hospital on 11/26/2018 with confusion, encephalopathy and generalized weakness. Note prior hospitalizations with admission dates of 11/1/2018 and 10/15/2018. Plan is TCU placement prior to home return          Interval History:   Improved confusion, less lethargy but waxing and waning pattern of HE.  No tremor. No fever or chills. PT seeing but patient with improved cooperation overall. Recommending TCU. Pt interested in restorative efforts. Seems aware prior to going home he needs more therapy time and to get stronger.            Review of Systems:   Palliative Symptom Review (0=no symptom/no concern, 1=mild, 2=moderate, 3=severe):      Pain: 0-1      Fatigue: 1      Nausea: 0          Constipation: 0      Diarrhea: 1 (lactulose therapy)       Depressive Symptoms: 1      Anxiety:1      Drowsiness: 1      Poor Appetite: 1-2      Shortness of Breath: 0-1                Medications:   I have reviewed this patient's medication profile and medications given in past 24 hours.        {   Physical exam Vitals: /74 (BP Location: Left arm)   Pulse 67   Temp 97.3  F (36.3  C) (Axillary)   Resp 16   Wt 72.7 kg (160 lb 3.2 oz)   SpO2 97%   BMI 22.99 kg/m    BMI= Body mass index is 22.99 kg/m .   General appearance: less lethargic, oriented to self as well as family names- remains vague about details of his condition. Sitting in bedside chair- eating some fruit-  Initiating some conversation.  HEENT: Symmetric features, EOMI, sclera mildly icteric. Mucous membranes moist - tongue midline.  Respiratory: Clear to auscultation bilaterally. Comfortable on room air. No wheeze or rales.    Cardiovascular: S1-S2 without murmur. RRR, rate remains in the 70s at rest.  Abdomen: Distended. Brief exam- again without tenderness. No focal findings other than ++ ascites.  BS present.  Skin: Jaundice, no evidence for open areas. Port right chest is accessed and site appears clean dry and intact.  Other: Generalized weakness. No resting tremor identified. Slow to respond to questions seemingly fewer disorganized thought patterns.                Data Reviewed:   ROUTINE LABS (Last four results)  BMP  Recent Labs   Lab 12/07/18  0543      POTASSIUM 3.6   CHLORIDE 111*   UCHE 8.1*   CO2 20   BUN 23   CR 1.04   GLC 90        Lab Test 12/01/18  0539   PROTTOTAL 6.1*   ALBUMIN 3.8   BILITOTAL 1.4*   ALKPHOS 199*   AST 38   ALT 30     CBC  Recent Labs   Lab 12/07/18  0543   WBC 1.6*   RBC 3.03*   HGB 9.9*   HCT 30.0*   MCV 99   MCH 32.7   MCHC 33.0   RDW 14.8   PLT 34*     INR  No lab results found in last 7 days.

## 2018-12-12 NOTE — DISCHARGE SUMMARY
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)  Right sided weakness, language difficulties due to above  Recent E.Coli Bacteremia  Recent Rectal Variceal Coiling  Acute Kidney Injury, resolved  Chronic pancytopenia  Severe Malnutrition    Follow-ups Needed After Discharge   TCU provider within 5 days  Ongoing therapeutic paracentesis as needed (therapy plan in place)  GI as previously scheduled (2/13), or sooner if other issues arise  Palliative care outpatient after discharge from TCU    Hospital Course     Events Leading Up to Hospitalization:    Mono Varghese is a 48 year old male with history of left parietal astrocytoma s/p resection, ESLD c/b refractory ascites requiring every 2 week genia (removing 8-12 L each time and receiving albumin), hepatic encephalopathy, rectal and esophageal varices and PVT (not currently on anticoagulation medication), and hypothyroidism, who presents with encephalopathy and generalized weakness.     Please see admission H&P for further details.    The following problems were addressed during this hospitalization:    Hepatic Encephalopathy, Severe  Ammonia was markedly elevated on admission, he had no localizing symptoms for infection.  Diagnostic paracentesis was done to rule out SBP., and repeated blood cultures were negative.  He required aggressive lactulose enemas during his first 2 days of hospital stay, however mental status improved markedly with this, and he was able to tolerate p.o. lactulose approximately 36 hours after admission.  An EEG was completed, and while it was not normal did not show ongoing seizure activity - in  conjunction with neurology he was started on a regimen to convert from carbamazepine to keppra as detailed below. Exact cause of decompensation remains unclear, as he and family reports only missing perhaps 1-2 doses of lactulose in the days prior and infectious workup was negative.  He does require significant encouragement to take his lactulose each day, however this is a critical medication for him should be strongly encouraged.  If he has less than 4 bowel movements in a day, an additional dose of lactulose should be given.  If this is still unable to obtain 4 bowel movements per day, facility provider should be contacted to assess for additional doses.      Alcoholic Cirrhosis with Refractory Ascites  Prior to admission, he was receiving high-volume paracenteses every 1-2 weeks.  However, with resumption of his home diuretics, his ascites remained stable and he did not require any therapeutic paracentesis at this admission.  He was offered a therapeutic paracentesis prior to discharge, however he and his mother-in-law declined this.  Should continue to receive outpatient paracenteses on an as-needed basis as he was doing prior to admission.    Weakness, Worsening  At baseline, patient able to transfer independently.  Primary caregivers are his wife, Yisel, and his mother-in-law, Faby.  Even following resolution of his hepatic encephalopathy, he remained very weak, requiring assistance with all transfers above the level of support they are able to provide at home.  He had a prolonged hospital course while awaiting TCU placement, but was medically stable for >7 days prior to discharge.  He will benefit from ongoing PT, OT, and SLP services.    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  EEG was somewhat difficult to interpret, and there was some concern for some possible epileptiform discharges.  However, ultimately neurology did not feel that he was in status epilepticus.  Due to recent worsening of his right-sided weaknesses, a brain MRI was repeated this admission; we are unable to obtain all desired sequences, however from obtained sequences there is no gross change from prior.  Due to his liver disease, neurology recommended tapering off carbamazepine and starting Keppra instead.  A detailed taper plan can be seen in the orders below.      Acute Kidney Injury, resolved  Admission creatinine of 1.6, likely prerenal from worsening hepatic encephalopathy.  With fluid resuscitation and resolution of his encephalopathy as above, this improved back to baseline prior to discharge.    Chronic pancytopenia  This is been a chronic issue for him, has been assessed at numerous hematology/oncology visits.  It appears stable this hospital stay.  He can continue to follow-up with his outpatient providers.    Severe Malnutrition  Patient has evidence of temporal wasting, decreased muscle mass, worsening ascites.  Family is quite clear that he would not want a feeding tube.  The time of discharge, patient was intermittently taking adequate p.o. intake with greater than 1400 louise/day, however he requires frequent encouragement to eat.  Would strongly recommend that TCU continues to encourage fluids at every meal, and provide food provided by his family frequently throughout the day.    Goals Of Care  Mr. Varghese has had increasingly frequent hospitalizations over the past few months with more frequent episodes of hepatic encephalopathy.  Palliative care was consulted this admission as we anticipate he will likely continue to have admissions for hepatic encephalopathy in the future.  At present, no changes have been made to his/his family's goals.  They have been quite clear that he does not want a  feeding tube, and does not want intubation or CPR.  Ongoing discussions regarding long term goals of care should be continued on an outpatient basis after discharge from TCU.  An outpatient palliative referral was placed.        Consultations This Hospital Stay   Neurology  Palliative Care    Significant Results and Procedures   MRI Brain:  Impression:   1.  No acute intracranial pathology.  2.  Stable postoperative changes of left parietal craniotomy with  underlying resection cavity.  3.  Unchanged leukoaraiosis.    US Abdomen:                                                                   Impression:   1. No sonographic evidence of portal vein thrombus.  2. Patent hepatic vasculature.  3. Cirrhotic liver with ascites.  4. Nonspecific gallbladder wall thickening, likely secondary to  ascites and chronic liver disease.  5. Cholelithiasis.    Most Recent 3 CBC's:  Recent Labs   Lab Test 12/07/18  0543 12/04/18 0715 12/01/18  0539   WBC 1.6* 1.8* 1.6*   HGB 9.9* 9.4* 9.6*   MCV 99 99 100   PLT 34* 35* 34*     Most Recent 3 BMP's:  Recent Labs   Lab Test 12/07/18 0543 12/05/18 0443 12/04/18 0718  11/30/18  0542     --  142  --  140   POTASSIUM 3.6 3.6 2.9*   < > 3.4   CHLORIDE 111*  --  114*  --  113*   CO2 20  --  18*  --  20   BUN 23  --  24  --  26   CR 1.04 1.13 1.12   < > 1.16   ANIONGAP 10  --  10  --  8   UCHE 8.1*  --  7.5*  --  8.4*   GLC 90  --  81  --  95    < > = values in this interval not displayed.     Most Recent 2 LFT's:  Recent Labs   Lab Test 12/01/18  0539 11/30/18  0542   AST 38 49*   ALT 30 36   ALKPHOS 199* 220*   BILITOTAL 1.4* 1.4*     Most Recent 3 INR's:  Recent Labs   Lab Test 12/01/18 0539 11/30/18  0542 11/29/18  0726   INR 1.88* 1.90* 1.74*           Pending Results   None    Code Status   DNR / DNI, POLST on file         The patient was discussed with attending physician, Dr. Graves, on day of discharge.    Teofilo Hamm MD, PhD  PGY-3, Internal Medicine  Pager:  [phone]  ______________________________________________________________________    Physical Exam   Vital Signs: Temp: 97.3  F (36.3  C) Temp src: Axillary BP: 114/74 Pulse: 67 Heart Rate: 68 Resp: 16 SpO2: 97 % O2 Device: None (Room air)    Weight: 160 lbs 3.2 oz    General Appearance: Thin, chronically ill appearing, NAD  Respiratory: CTAB, nwob, on RA  Cardiovascular: RRR, no m/r/g  GI: distended but non-tense, no tenderness with palpation  Skin: warm, dry, no rashes, no jaundice  Neuro: Alert, oriented to self, location but not date or full events.  R sided much weaker than left, especially RUE.  Slow, strained speech but able to answer with enough time (rquires up to 1 minute to answer frequently) though only able to answer in 3-4 word phrases.      Primary Care Physician   [place]    Discharge Disposition   Discharged to short-term care facility  Condition at discharge: Stable    Discharge Orders      Medication Therapy Management Referral      Palliative Care Referral      General info for SNF    Length of Stay Estimate: Short Term Care: Estimated # of Days <30  Condition at Discharge: Improving  Level of care:skilled   Rehabilitation Potential: Good  Admission H&P remains valid and up-to-date: Yes  Recent Chemotherapy: N/A  Use Nursing Home Standing Orders: Yes     Mantoux instructions    Give two-step Mantoux (PPD) Per Facility Policy Yes     Reason for your hospital stay    You were hospitalized for hepatic encephalopathy leading to deconditioning. Your encephalopathy improved with regular lactulose treatments, and your deconditioning improved with physical therapy.     Activity - Up with nursing assistance     Activity - Ambulate in hallway    Every shift     Intake and output    Monitor for 4 bowel movements per day. If less than 4 bowel movements per day contact TCU provider immediately, give additional dose of lactulose.     Follow Up and recommended labs and tests    Follow up with Nursing  home physician.  No follow up labs or test are needed. Continue to follow therapy plan for therapeutic paracentesis.     DNR/DNI     Physical Therapy Adult Consult    Evaluate and treat as clinically indicated.    Reason:  Deconditioning due to prolonged hospital stay and hepatic encephalopathy     Occupational Therapy Adult Consult    Evaluate and treat as clinically indicated.    Reason:  Deconditioning due to prolonged hospital stay and hepatic encephalopathy     Advance Diet as Tolerated    Follow this diet upon discharge: Regular       Discharge Medications   Current Discharge Medication List      START taking these medications    Details   acetaminophen (TYLENOL) 325 MG tablet Take 2 tablets (650 mg) by mouth every 8 hours as needed for mild pain    Associated Diagnoses: Other chronic pain      carBAMazepine (TEGRETOL) 100 MG chewable tablet Take 150 mg bid  7 days (until 12/18)  Then take 150 mg in am, 100 mg in pm 7 days(starting 12/19 until 12/25)  Then take 100 mg bid bid 7 days (starting 12/26 until 1/1)  Then take 100 mg in am, 50 mg in pm 7 days (starting 1/2 until 1/8)  Then take 50 mg bid 7 days (starting 1/9 until 1/15)  Then take 50 mg once daily (starting 1/16 until 1/22)  Then stop  Refills: 0    Associated Diagnoses: Seizure disorder (H)      hydrOXYzine (ATARAX) 25 MG tablet Take 1 tablet (25 mg) by mouth 2 times daily as needed for itching  Qty: 60 tablet, Refills: 0    Associated Diagnoses: Itching; Anxiety      !! lactulose (CHRONULAC) 10 GM/15ML solution Take 60 mLs by mouth daily as needed for constipation (If less than 4 bowel movements)  Refills: 0    Associated Diagnoses: Hepatic encephalopathy (H)      levETIRAcetam (KEPPRA) 750 MG tablet Take 1 tablet (750 mg) by mouth 2 times daily  Qty: 60 tablet, Refills: 3    Associated Diagnoses: Hepatic encephalopathy (H)       !! - Potential duplicate medications found. Please discuss with provider.      CONTINUE these medications which have  CHANGED    Details   methylphenidate (RITALIN) 10 MG tablet Take 1 tablet (10 mg) by mouth 2 times daily (take at 8AM and 2pm)  Qty: 14 tablet, Refills: 0    Associated Diagnoses: Attention deficit hyperactivity disorder (ADHD), unspecified ADHD type      miconazole (MICATIN/MICRO GUARD) 2 % external powder Apply topically 2 times daily To groin as needed  Qty: 43 g    Associated Diagnoses: Dermatitis      spironolactone (ALDACTONE) 25 MG tablet Take 1 tablet (25 mg) by mouth daily  Qty: 60 tablet, Refills: 0    Associated Diagnoses: Other ascites         CONTINUE these medications which have NOT CHANGED    Details   Calcium Carb-Cholecalciferol (CALCIUM 500 +D) 500-400 MG-UNIT TABS Take 500 mg by mouth daily  Qty: 90 tablet, Refills: 1    Associated Diagnoses: Malnutrition (H)      cyanocobalamin 1000 MCG TABS Take 1 tablet by mouth daily      ferrous sulfate (IRON) 325 (65 FE) MG tablet Take 1 tablet (325 mg) by mouth 2 times daily  Qty: 200 tablet, Refills: 3    Associated Diagnoses: Other iron deficiency anemia      furosemide (LASIX) 20 MG tablet Take 1 tablet (20 mg) by mouth daily  Qty: 30 tablet, Refills: 3    Associated Diagnoses: Other ascites      gabapentin (NEURONTIN) 100 MG capsule Take 1 capsule (100 mg) by mouth At Bedtime  Qty: 30 capsule, Refills: 3    Associated Diagnoses: Mild episode of recurrent major depressive disorder (H)      !! lactulose (CHRONULAC) 10 GM/15ML solution Take 60 mLs by mouth 2 times daily  Qty: 3600 mL, Refills: 3    Associated Diagnoses: Hepatic encephalopathy (H)      levothyroxine (SYNTHROID/LEVOTHROID) 88 MCG tablet Take 1 tablet (88 mcg) by mouth daily  Qty: 90 tablet, Refills: 1    Associated Diagnoses: Hypothyroidism, unspecified type      mirtazapine (REMERON) 45 MG tablet Take 1 tablet (45 mg) by mouth At Bedtime  Qty: 30 tablet, Refills: 2    Associated Diagnoses: Major depressive disorder with single episode, in partial remission (H)      multivitamin, therapeutic  with minerals (THERA-VIT-M) TABS Take 1 tablet by mouth 2 times daily      omeprazole (PRILOSEC) 20 MG CR capsule Take 2 capsules (40 mg) by mouth 2 times daily  Qty: 360 capsule, Refills: 3    Associated Diagnoses: Gastroesophageal reflux disease without esophagitis      pravastatin (PRAVACHOL) 80 MG tablet Take 1 tablet (80 mg) by mouth daily  Qty: 90 tablet, Refills: 3    Associated Diagnoses: High cholesterol      thiamine (VITAMIN B-1) 100 MG tablet Take 1 tablet (100 mg) by mouth daily  Qty: 100 tablet, Refills: 1    Associated Diagnoses: Alcoholic cirrhosis of liver with ascites (H)      traZODone (DESYREL) 50 MG tablet Take 1 tablet (50 mg) by mouth nightly as needed for sleep  Qty: 30 tablet, Refills: 2    Associated Diagnoses: Primary insomnia      Vitamin D, Cholecalciferol, 1000 units TABS Take 1 tablet by mouth daily      XIFAXAN 550 MG TABS tablet TAKE ONE TABLET BY MOUTH TWICE DAILY  Qty: 60 tablet, Refills: 11    Comments: Please consider 90 day supplies to promote better adherence  Associated Diagnoses: Hepatic encephalopathy (H)      blood glucose monitoring (ONETOUCH ULTRA) test strip Use to test blood sugars 4 times daily as needed or as directed.  Qty: 400 each, Refills: 1    Associated Diagnoses: Diabetes mellitus, type 2 (H)      CANE, ANY MATERIAL One cane  Qty: 1 each, Refills: 0    Associated Diagnoses: Balance disorder      ciclopirox (LOPROX) 0.77 % cream Apply topically 2 times daily To feet and toenails.  Qty: 90 g, Refills: 4    Associated Diagnoses: Tinea pedis of both feet      lidocaine (LMX4) 4 % CREA cream Apply topically once as needed for mild pain  Qty: 133 g, Refills: 1    Associated Diagnoses: Port catheter in place      ONE TOUCH LANCETS MISC by In Vitro route 4 times daily as needed  Qty: 100 each, Refills: prn    Comments: As of January 1 pt's insurance will only cover onetouch or accu check.  Patient needs a  new glucometer (one touch), too  Associated Diagnoses: Type II  "or unspecified type diabetes mellitus without mention of complication, not stated as uncontrolled      order for DME Equipment being ordered: manual wheelchair and cushion    [employer] light weight Sx5 18x18, 17.5 inch floor height and a 2 inch 18x18 inch cushion    length of need : lifetime        Patient's height : 5/10\", weight : 172 pounds  Qty: 1 Device, Refills: 0    Associated Diagnoses: Falls frequently; At high risk for falls; Imbalance; Limited mobility       !! - Potential duplicate medications found. Please discuss with provider.      STOP taking these medications       carBAMazepine (TEGRETOL) 200 MG tablet Comments:   Reason for Stopping:         HYDROcodone-acetaminophen (NORCO) 5-325 MG per tablet Comments:   Reason for Stopping:               Allergies   Allergies   Allergen Reactions     Latex Itching and Rash     No Clinical Screening - See Comments      Coban and Surgilast cause itching     Tegaderm Transparent Dressing (Informational Only)        "

## 2018-12-12 NOTE — PROGRESS NOTES
Social Work Services Discharge Note      Patient Name:  Mono Varghese     Anticipated Discharge Date:  12/12/18    Discharge Disposition:   TCU:  [facility]   [address]  [facility] [place] [address]   [phone]  Fax [phone]    Following MD:  Facility assignment     Pre-Admission Screening (PAS) online form has been completed.  The Level of Care (LOC) is:  Determined  Confirmation Code is:  [id number]  Patient/caregiver informed of referral to [facility] for Pre-Admission Screening for skilled nursing facility (SNF) placement and to expect a phone call post discharge from SNF.     Additional Services/Equipment Arranged:  [facility] (ph [phone]) wc van arranged for 2pm.      Patient / Family response to discharge plan:  Pt and family in agreement with plan.      Persons notified of above discharge plan:  Pt, RN Amy Denis- admissions at [facility] (ph [phone]), primary team P[facility] 3, pt's mother-in-law Faby (ph [phone]). Have left 2 VMs for pt's wife Yisel (ph [phone]) and have not received call back- Faby states she has updated Yisel and that she is in agreement with plan. Per primary team Yisel wants all communication to go through Faby.    Staff Discharge Instructions:  RN to call report to [phone].  SW to fax discharge orders and signed hard scripts for any controlled substances.  Please print a packet and send with patient.     CTS Handoff completed:  YES    FRANKLYN Felix, LGSW  5A Unit   Pager [phone]  Phone [phone]

## 2018-12-12 NOTE — PROGRESS NOTES
This is a recent snapshot of the patient's [facility] medical record.  For current drug dose and complete information and questions, call [phone] or In Basket pool, [facility] home infusion ([id number])  CSN Number:  [id number]

## 2018-12-12 NOTE — PLAN OF CARE
Pt A&O 2-3. Pt slow to respond and need time to answer. Follows commands. VSS on RA. Pt compliant with cares and medications. Incontinent BM x2. Pt states he is unable to tell when he has a BM. Spouse visited. Brought food from outside hospital. Pt having low appetite for dinner. Medically stable and awaiting TCU/rehab placement.

## 2018-12-12 NOTE — PLAN OF CARE
Patient A&O x2-3 (baseline). VSS. Port deaccessed and heparin locked. Report given to [facility]. Patient sent with paperwork and all belongings accounted for and sent with patient. [facility] transport.

## 2018-12-12 NOTE — PLAN OF CARE
Pt alert and oriented; forgetful about time. Vitally stable on ra. Up with assist of one. Cooperative with cares. Denies pain. Had incontinent void X1; no BM. Chest port HL. Slept through the night. Hourly rounding completed. No significant changes in status. BA active for intermittent confusion. Medically stable and awaiting rehab placement. Continue with poc.

## 2018-12-12 NOTE — PLAN OF CARE
PT - per plan established by the Physical Therapist, according to functional mobility the  discharge recommendation is TCU for cont skilled PT. Pt discharge before last PT session. Pt discharge status from last PT date of 12/11/18. Pt needing encouragement to work with PT. Pt needing CGA to min A for sit to stand to WW for standing support. Pt amb up to 50'x 1 needing CGA. Pt was able to demo amb up to 160'x2 on 12/7/18. Pt needing min A for all bed mob.   Discharge Planner PT   Patient plan for discharge: rehab.   Current status: see above.   Barriers to return to prior living situation: weakness  Recommendations for discharge: TCU   Rationale for recommendations: cont skilled PT to progress functional mobility.     Pt needing cont skilled PT due to unmet goals         Entered by: Willis Verdin 12/12/2018 3:01 PM

## 2018-12-13 NOTE — PLAN OF CARE
Speech Language Therapy Discharge Summary    Reason for therapy discharge:    Discharged to transitional care facility.    Progress towards therapy goal(s). See goals on Care Plan in [facility] electronic health record for goal details.  Goals not met.  Barriers to achieving goals:   discharge from facility.    Therapy recommendation(s):    Continued therapy is recommended.  Rationale/Recommendations:  Continued ST for dysphagia to assess diet tolerance.    Pt remains appropriate for regular diet and thin liquids, given the following precautions: 1:1 supervision at meals for use of strategies, alternate solids and liquids, small bites and sips. SLP to follow for strategy use.

## 2018-12-13 NOTE — TELEPHONE ENCOUNTER
MTM referral from: Transitions of Care (recent hospital discharge or ED visit)    MTM referral outreach attempt #1 on December 13, 2018 at 1:00 PM      Outcome: Patient is not interested at this time because to a rehab facility, will route to MTM Pharmacist/Provider as an FYI. Thank you for the referral.     Faith Meza, MTM Coordinator

## 2019-01-01 ENCOUNTER — NURSING HOME VISIT (OUTPATIENT)
Dept: GERIATRICS | Facility: CLINIC | Age: 49
End: 2019-01-01
Payer: MEDICARE

## 2019-01-01 ENCOUNTER — APPOINTMENT (OUTPATIENT)
Dept: GENERAL RADIOLOGY | Facility: CLINIC | Age: 49
DRG: 432 | End: 2019-01-01
Attending: STUDENT IN AN ORGANIZED HEALTH CARE EDUCATION/TRAINING PROGRAM
Payer: MEDICARE

## 2019-01-01 ENCOUNTER — MEDICAL CORRESPONDENCE (OUTPATIENT)
Dept: HEALTH INFORMATION MANAGEMENT | Facility: CLINIC | Age: 49
End: 2019-01-01

## 2019-01-01 ENCOUNTER — MYC REFILL (OUTPATIENT)
Dept: INTERNAL MEDICINE | Facility: CLINIC | Age: 49
End: 2019-01-01

## 2019-01-01 ENCOUNTER — APPOINTMENT (OUTPATIENT)
Dept: GENERAL RADIOLOGY | Facility: CLINIC | Age: 49
DRG: 432 | End: 2019-01-01
Attending: EMERGENCY MEDICINE
Payer: MEDICARE

## 2019-01-01 ENCOUNTER — MYC MEDICAL ADVICE (OUTPATIENT)
Dept: FAMILY MEDICINE | Facility: CLINIC | Age: 49
End: 2019-01-01

## 2019-01-01 ENCOUNTER — DOCUMENTATION ONLY (OUTPATIENT)
Dept: CARE COORDINATION | Facility: CLINIC | Age: 49
End: 2019-01-01

## 2019-01-01 ENCOUNTER — DISCHARGE SUMMARY NURSING HOME (OUTPATIENT)
Dept: GERIATRICS | Facility: CLINIC | Age: 49
End: 2019-01-01
Payer: MEDICARE

## 2019-01-01 ENCOUNTER — TRANSFERRED RECORDS (OUTPATIENT)
Dept: HEALTH INFORMATION MANAGEMENT | Facility: CLINIC | Age: 49
End: 2019-01-01

## 2019-01-01 ENCOUNTER — TELEPHONE (OUTPATIENT)
Dept: PSYCHIATRY | Facility: CLINIC | Age: 49
End: 2019-01-01

## 2019-01-01 ENCOUNTER — OFFICE VISIT (OUTPATIENT)
Dept: NEUROLOGY | Facility: CLINIC | Age: 49
End: 2019-01-01
Payer: MEDICARE

## 2019-01-01 ENCOUNTER — TELEPHONE (OUTPATIENT)
Dept: NEUROLOGY | Facility: CLINIC | Age: 49
End: 2019-01-01

## 2019-01-01 ENCOUNTER — RECORDS - HEALTHEAST (OUTPATIENT)
Dept: LAB | Facility: CLINIC | Age: 49
End: 2019-01-01

## 2019-01-01 ENCOUNTER — HOSPITAL ENCOUNTER (INPATIENT)
Facility: CLINIC | Age: 49
LOS: 7 days | Discharge: NURSING, REHAB OR LONG-TERM CARE | DRG: 432 | End: 2019-02-19
Attending: EMERGENCY MEDICINE | Admitting: HOSPITALIST
Payer: MEDICARE

## 2019-01-01 ENCOUNTER — APPOINTMENT (OUTPATIENT)
Dept: PHYSICAL THERAPY | Facility: CLINIC | Age: 49
DRG: 432 | End: 2019-01-01
Payer: MEDICARE

## 2019-01-01 ENCOUNTER — TELEPHONE (OUTPATIENT)
Dept: GERIATRICS | Facility: CLINIC | Age: 49
End: 2019-01-01

## 2019-01-01 ENCOUNTER — TELEPHONE (OUTPATIENT)
Dept: FAMILY MEDICINE | Facility: CLINIC | Age: 49
End: 2019-01-01

## 2019-01-01 ENCOUNTER — OFFICE VISIT (OUTPATIENT)
Dept: PSYCHIATRY | Facility: CLINIC | Age: 49
End: 2019-01-01
Attending: PSYCHIATRY & NEUROLOGY
Payer: MEDICARE

## 2019-01-01 ENCOUNTER — MYC MEDICAL ADVICE (OUTPATIENT)
Dept: PSYCHIATRY | Facility: CLINIC | Age: 49
End: 2019-01-01

## 2019-01-01 ENCOUNTER — MYC REFILL (OUTPATIENT)
Dept: GASTROENTEROLOGY | Facility: CLINIC | Age: 49
End: 2019-01-01

## 2019-01-01 ENCOUNTER — APPOINTMENT (OUTPATIENT)
Dept: INTERVENTIONAL RADIOLOGY/VASCULAR | Facility: CLINIC | Age: 49
DRG: 432 | End: 2019-01-01
Attending: RADIOLOGY
Payer: MEDICARE

## 2019-01-01 ENCOUNTER — TELEPHONE (OUTPATIENT)
Dept: INTERNAL MEDICINE | Facility: CLINIC | Age: 49
End: 2019-01-01

## 2019-01-01 ENCOUNTER — DOCUMENTATION ONLY (OUTPATIENT)
Dept: OTHER | Facility: CLINIC | Age: 49
End: 2019-01-01

## 2019-01-01 ENCOUNTER — APPOINTMENT (OUTPATIENT)
Dept: INTERVENTIONAL RADIOLOGY/VASCULAR | Facility: CLINIC | Age: 49
DRG: 432 | End: 2019-01-01
Attending: NURSE PRACTITIONER
Payer: MEDICARE

## 2019-01-01 ENCOUNTER — MYC REFILL (OUTPATIENT)
Dept: NEUROLOGY | Facility: CLINIC | Age: 49
End: 2019-01-01

## 2019-01-01 ENCOUNTER — DOCUMENTATION ONLY (OUTPATIENT)
Dept: NEUROLOGY | Facility: CLINIC | Age: 49
End: 2019-01-01

## 2019-01-01 VITALS
HEIGHT: 70 IN | OXYGEN SATURATION: 96 % | WEIGHT: 174 LBS | DIASTOLIC BLOOD PRESSURE: 80 MMHG | BODY MASS INDEX: 24.91 KG/M2 | HEART RATE: 84 BPM | SYSTOLIC BLOOD PRESSURE: 132 MMHG

## 2019-01-01 VITALS — SYSTOLIC BLOOD PRESSURE: 123 MMHG | DIASTOLIC BLOOD PRESSURE: 79 MMHG | HEART RATE: 71 BPM

## 2019-01-01 VITALS — HEART RATE: 80 BPM | DIASTOLIC BLOOD PRESSURE: 76 MMHG | SYSTOLIC BLOOD PRESSURE: 110 MMHG

## 2019-01-01 VITALS
RESPIRATION RATE: 24 BRPM | OXYGEN SATURATION: 96 % | HEART RATE: 70 BPM | SYSTOLIC BLOOD PRESSURE: 119 MMHG | WEIGHT: 155.2 LBS | TEMPERATURE: 98.7 F | DIASTOLIC BLOOD PRESSURE: 68 MMHG | HEIGHT: 66 IN | BODY MASS INDEX: 24.94 KG/M2

## 2019-01-01 VITALS
DIASTOLIC BLOOD PRESSURE: 69 MMHG | SYSTOLIC BLOOD PRESSURE: 107 MMHG | HEART RATE: 73 BPM | TEMPERATURE: 97.7 F | HEIGHT: 66 IN | BODY MASS INDEX: 24.94 KG/M2 | WEIGHT: 155.2 LBS | RESPIRATION RATE: 24 BRPM | OXYGEN SATURATION: 95 %

## 2019-01-01 VITALS
HEART RATE: 71 BPM | DIASTOLIC BLOOD PRESSURE: 63 MMHG | WEIGHT: 148 LBS | OXYGEN SATURATION: 98 % | SYSTOLIC BLOOD PRESSURE: 103 MMHG | RESPIRATION RATE: 16 BRPM | TEMPERATURE: 95.7 F | HEIGHT: 66 IN | BODY MASS INDEX: 23.78 KG/M2

## 2019-01-01 VITALS
HEART RATE: 67 BPM | HEIGHT: 66 IN | BODY MASS INDEX: 26.65 KG/M2 | DIASTOLIC BLOOD PRESSURE: 80 MMHG | WEIGHT: 165.8 LBS | OXYGEN SATURATION: 96 % | SYSTOLIC BLOOD PRESSURE: 128 MMHG | TEMPERATURE: 97.4 F | RESPIRATION RATE: 20 BRPM

## 2019-01-01 DIAGNOSIS — G47.00 INSOMNIA, UNSPECIFIED TYPE: ICD-10-CM

## 2019-01-01 DIAGNOSIS — K76.82 HEPATIC ENCEPHALOPATHY (H): ICD-10-CM

## 2019-01-01 DIAGNOSIS — F51.01 PRIMARY INSOMNIA: ICD-10-CM

## 2019-01-01 DIAGNOSIS — F32.1 MODERATE MAJOR DEPRESSION (H): Primary | ICD-10-CM

## 2019-01-01 DIAGNOSIS — K70.30 ALCOHOLIC CIRRHOSIS, UNSPECIFIED WHETHER ASCITES PRESENT (H): ICD-10-CM

## 2019-01-01 DIAGNOSIS — K70.10 ALCOHOLIC HEPATITIS WITHOUT ASCITES (H): ICD-10-CM

## 2019-01-01 DIAGNOSIS — K76.82 HEPATIC ENCEPHALOPATHY (H): Primary | ICD-10-CM

## 2019-01-01 DIAGNOSIS — F32.4 MAJOR DEPRESSIVE DISORDER WITH SINGLE EPISODE, IN PARTIAL REMISSION (H): ICD-10-CM

## 2019-01-01 DIAGNOSIS — F39 MOOD DISORDER (H): ICD-10-CM

## 2019-01-01 DIAGNOSIS — K21.9 GASTROESOPHAGEAL REFLUX DISEASE, ESOPHAGITIS PRESENCE NOT SPECIFIED: ICD-10-CM

## 2019-01-01 DIAGNOSIS — E46 MALNUTRITION, UNSPECIFIED TYPE (H): ICD-10-CM

## 2019-01-01 DIAGNOSIS — F41.9 ANXIETY: ICD-10-CM

## 2019-01-01 DIAGNOSIS — R29.6 RECURRENT FALLS: ICD-10-CM

## 2019-01-01 DIAGNOSIS — G62.9 PERIPHERAL POLYNEUROPATHY: ICD-10-CM

## 2019-01-01 DIAGNOSIS — G47.33 OSA (OBSTRUCTIVE SLEEP APNEA): ICD-10-CM

## 2019-01-01 DIAGNOSIS — G40.802 OTHER EPILEPSY WITHOUT STATUS EPILEPTICUS, NOT INTRACTABLE (H): Primary | ICD-10-CM

## 2019-01-01 DIAGNOSIS — N17.9 AKI (ACUTE KIDNEY INJURY) (H): ICD-10-CM

## 2019-01-01 DIAGNOSIS — R18.8 OTHER ASCITES: ICD-10-CM

## 2019-01-01 DIAGNOSIS — K70.11 ALCOHOLIC HEPATITIS WITH ASCITES (H): ICD-10-CM

## 2019-01-01 DIAGNOSIS — E78.00 HIGH CHOLESTEROL: ICD-10-CM

## 2019-01-01 DIAGNOSIS — G40.209 PARTIAL EPILEPSY WITH IMPAIRMENT OF CONSCIOUSNESS (H): Primary | Chronic | ICD-10-CM

## 2019-01-01 DIAGNOSIS — F32.9 MAJOR DEPRESSIVE DISORDER, REMISSION STATUS UNSPECIFIED, UNSPECIFIED WHETHER RECURRENT: ICD-10-CM

## 2019-01-01 DIAGNOSIS — J96.01 ACUTE RESPIRATORY FAILURE WITH HYPOXIA (H): ICD-10-CM

## 2019-01-01 DIAGNOSIS — K76.82 ENCEPHALOPATHY, HEPATIC (H): Primary | ICD-10-CM

## 2019-01-01 DIAGNOSIS — E03.9 HYPOTHYROIDISM, UNSPECIFIED TYPE: ICD-10-CM

## 2019-01-01 DIAGNOSIS — F90.9 ATTENTION DEFICIT HYPERACTIVITY DISORDER (ADHD), UNSPECIFIED ADHD TYPE: ICD-10-CM

## 2019-01-01 DIAGNOSIS — Z87.898 HISTORY OF SEIZURE: ICD-10-CM

## 2019-01-01 DIAGNOSIS — Z51.5 HOSPICE CARE PATIENT: ICD-10-CM

## 2019-01-01 DIAGNOSIS — K70.31 ALCOHOLIC CIRRHOSIS OF LIVER WITH ASCITES (H): Primary | ICD-10-CM

## 2019-01-01 DIAGNOSIS — J90 PLEURAL EFFUSION ON RIGHT: ICD-10-CM

## 2019-01-01 DIAGNOSIS — R09.02 HYPOXIA: ICD-10-CM

## 2019-01-01 DIAGNOSIS — F33.0 MILD EPISODE OF RECURRENT MAJOR DEPRESSIVE DISORDER (H): ICD-10-CM

## 2019-01-01 DIAGNOSIS — Z51.5 HOSPICE CARE: ICD-10-CM

## 2019-01-01 DIAGNOSIS — L29.9 ITCHING: ICD-10-CM

## 2019-01-01 DIAGNOSIS — E78.5 HYPERLIPIDEMIA, UNSPECIFIED HYPERLIPIDEMIA TYPE: ICD-10-CM

## 2019-01-01 DIAGNOSIS — R06.00 DYSPNEA, UNSPECIFIED TYPE: ICD-10-CM

## 2019-01-01 DIAGNOSIS — L29.9 ITCHING: Primary | ICD-10-CM

## 2019-01-01 DIAGNOSIS — E11.9 TYPE 2 DIABETES MELLITUS WITHOUT COMPLICATION, WITHOUT LONG-TERM CURRENT USE OF INSULIN (H): Chronic | ICD-10-CM

## 2019-01-01 DIAGNOSIS — G40.802 OTHER EPILEPSY WITHOUT STATUS EPILEPTICUS, NOT INTRACTABLE (H): ICD-10-CM

## 2019-01-01 DIAGNOSIS — F32.1 MODERATE MAJOR DEPRESSION (H): Chronic | ICD-10-CM

## 2019-01-01 DIAGNOSIS — K70.31 ALCOHOLIC CIRRHOSIS OF LIVER WITH ASCITES (H): ICD-10-CM

## 2019-01-01 DIAGNOSIS — Z87.19 HISTORY OF GI BLEED: ICD-10-CM

## 2019-01-01 DIAGNOSIS — G93.40 ENCEPHALOPATHY: ICD-10-CM

## 2019-01-01 DIAGNOSIS — E46 MALNUTRITION (H): ICD-10-CM

## 2019-01-01 DIAGNOSIS — R06.89 DIFFICULTY BREATHING: ICD-10-CM

## 2019-01-01 DIAGNOSIS — K70.31 ALCOHOLIC CIRRHOSIS OF LIVER WITH ASCITES (H): Primary | Chronic | ICD-10-CM

## 2019-01-01 LAB
ABO + RH BLD: NORMAL
ABO + RH BLD: NORMAL
ALBUMIN FLD-MCNC: 0.3 G/DL
ALBUMIN SERPL-MCNC: 3 G/DL (ref 3.4–5)
ALBUMIN SERPL-MCNC: 3.1 G/DL (ref 3.4–5)
ALBUMIN SERPL-MCNC: 3.5 G/DL (ref 3.5–5)
ALBUMIN SERPL-MCNC: 3.5 G/DL (ref 3.5–5)
ALBUMIN UR-MCNC: 10 MG/DL
ALP SERPL-CCNC: 272 U/L (ref 40–150)
ALP SERPL-CCNC: 274 U/L (ref 40–150)
ALP SERPL-CCNC: 287 U/L (ref 40–150)
ALP SERPL-CCNC: 320 U/L (ref 40–150)
ALP SERPL-CCNC: 326 U/L (ref 40–150)
ALP SERPL-CCNC: 331 U/L (ref 45–120)
ALP SERPL-CCNC: 331 U/L (ref 45–120)
ALP SERPL-CCNC: 337 U/L (ref 40–150)
ALP SERPL-CCNC: 357 U/L (ref 40–150)
ALT SERPL W P-5'-P-CCNC: 26 U/L (ref 0–70)
ALT SERPL W P-5'-P-CCNC: 26 U/L (ref 0–70)
ALT SERPL W P-5'-P-CCNC: 27 U/L (ref 0–70)
ALT SERPL W P-5'-P-CCNC: 28 U/L (ref 0–70)
ALT SERPL W P-5'-P-CCNC: 32 U/L (ref 0–45)
ALT SERPL W P-5'-P-CCNC: 32 U/L (ref 0–70)
ALT SERPL W P-5'-P-CCNC: 38 U/L (ref 0–70)
ALT SERPL-CCNC: 32 U/L (ref 0–45)
AMMONIA PLAS-SCNC: 91 UMOL/L (ref 10–50)
ANION GAP SERPL CALCULATED.3IONS-SCNC: 11 MMOL/L (ref 3–14)
ANION GAP SERPL CALCULATED.3IONS-SCNC: 12 MMOL/L (ref 3–14)
ANION GAP SERPL CALCULATED.3IONS-SCNC: 12 MMOL/L (ref 3–14)
ANION GAP SERPL CALCULATED.3IONS-SCNC: 7 MMOL/L (ref 3–14)
ANION GAP SERPL CALCULATED.3IONS-SCNC: 8 MMOL/L (ref 3–14)
ANION GAP SERPL CALCULATED.3IONS-SCNC: 9 MMOL/L (ref 3–14)
ANION GAP SERPL CALCULATED.3IONS-SCNC: 9 MMOL/L (ref 5–18)
ANION GAP SERPL CALCULATED.3IONS-SCNC: 9 MMOL/L (ref 5–18)
APPEARANCE FLD: CLEAR
APPEARANCE FLD: CLEAR
APPEARANCE UR: CLEAR
AST SERPL W P-5'-P-CCNC: 33 U/L (ref 0–45)
AST SERPL W P-5'-P-CCNC: 35 U/L (ref 0–45)
AST SERPL W P-5'-P-CCNC: 39 U/L (ref 0–45)
AST SERPL W P-5'-P-CCNC: 42 U/L (ref 0–45)
AST SERPL W P-5'-P-CCNC: 47 U/L (ref 0–40)
AST SERPL W P-5'-P-CCNC: 51 U/L (ref 0–45)
AST SERPL W P-5'-P-CCNC: 51 U/L (ref 0–45)
AST SERPL W P-5'-P-CCNC: 52 U/L (ref 0–45)
AST SERPL-CCNC: 47 U/L (ref 0–40)
BACTERIA #/AREA URNS HPF: ABNORMAL /HPF
BACTERIA SPEC CULT: NO GROWTH
BASOPHILS # BLD AUTO: 0 10E9/L (ref 0–0.2)
BASOPHILS # BLD AUTO: 0 10E9/L (ref 0–0.2)
BASOPHILS NFR BLD AUTO: 0.4 %
BASOPHILS NFR BLD AUTO: 1 %
BILIRUB DIRECT SERPL-MCNC: 0.7 MG/DL (ref 0–0.2)
BILIRUB DIRECT SERPL-MCNC: 0.7 MG/DL (ref 0–0.2)
BILIRUB DIRECT SERPL-MCNC: 0.8 MG/DL (ref 0–0.2)
BILIRUB DIRECT SERPL-MCNC: 1.2 MG/DL
BILIRUB SERPL-MCNC: 1.1 MG/DL (ref 0.2–1.3)
BILIRUB SERPL-MCNC: 1.2 MG/DL (ref 0.2–1.3)
BILIRUB SERPL-MCNC: 1.4 MG/DL (ref 0.2–1.3)
BILIRUB SERPL-MCNC: 1.8 MG/DL (ref 0.2–1.3)
BILIRUB SERPL-MCNC: 1.8 MG/DL (ref 0.2–1.3)
BILIRUB SERPL-MCNC: 2.6 MG/DL (ref 0–1)
BILIRUB SERPL-MCNC: 2.6 MG/DL (ref 0–1)
BILIRUB UR QL STRIP: NEGATIVE
BILIRUBIN DIRECT: 1.2 MG/DL
BLD GP AB SCN SERPL QL: NORMAL
BLD PROD TYP BPU: NORMAL
BLD PROD TYP BPU: NORMAL
BLD UNIT ID BPU: 0
BLOOD BANK CMNT PATIENT-IMP: NORMAL
BLOOD PRODUCT CODE: NORMAL
BPU ID: NORMAL
BUN SERPL-MCNC: 18 MG/DL (ref 7–30)
BUN SERPL-MCNC: 18 MG/DL (ref 8–22)
BUN SERPL-MCNC: 18 MG/DL (ref 8–22)
BUN SERPL-MCNC: 19 MG/DL (ref 7–30)
BUN SERPL-MCNC: 22 MG/DL (ref 7–30)
BUN SERPL-MCNC: 23 MG/DL (ref 7–30)
BUN SERPL-MCNC: 26 MG/DL (ref 7–30)
BUN SERPL-MCNC: 27 MG/DL (ref 7–30)
CALCIUM SERPL-MCNC: 8.1 MG/DL (ref 8.5–10.1)
CALCIUM SERPL-MCNC: 8.1 MG/DL (ref 8.5–10.1)
CALCIUM SERPL-MCNC: 8.2 MG/DL (ref 8.5–10.1)
CALCIUM SERPL-MCNC: 8.3 MG/DL (ref 8.5–10.1)
CALCIUM SERPL-MCNC: 8.4 MG/DL (ref 8.5–10.1)
CALCIUM SERPL-MCNC: 8.5 MG/DL (ref 8.5–10.1)
CALCIUM SERPL-MCNC: 8.8 MG/DL (ref 8.5–10.5)
CALCIUM SERPL-MCNC: 8.8 MG/DL (ref 8.5–10.5)
CARBAMAZEPINE SERPL-MCNC: 4.8 MG/L (ref 4–12)
CHLORIDE BLD-SCNC: 108 MMOL/L (ref 98–107)
CHLORIDE SERPL-SCNC: 109 MMOL/L (ref 94–109)
CHLORIDE SERPL-SCNC: 111 MMOL/L (ref 94–109)
CHLORIDE SERPL-SCNC: 111 MMOL/L (ref 94–109)
CHLORIDE SERPL-SCNC: 113 MMOL/L (ref 94–109)
CHLORIDE SERPL-SCNC: 114 MMOL/L (ref 94–109)
CHLORIDE SERPL-SCNC: 115 MMOL/L (ref 94–109)
CHLORIDE SERPLBLD-SCNC: 108 MMOL/L (ref 98–107)
CO2 SERPL-SCNC: 16 MMOL/L (ref 20–32)
CO2 SERPL-SCNC: 17 MMOL/L (ref 20–32)
CO2 SERPL-SCNC: 18 MMOL/L (ref 20–32)
CO2 SERPL-SCNC: 18 MMOL/L (ref 22–31)
CO2 SERPL-SCNC: 18 MMOL/L (ref 22–31)
CO2 SERPL-SCNC: 19 MMOL/L (ref 20–32)
COLOR FLD: YELLOW
COLOR FLD: YELLOW
COLOR UR AUTO: YELLOW
CREAT SERPL-MCNC: 1.22 MG/DL (ref 0.66–1.25)
CREAT SERPL-MCNC: 1.28 MG/DL (ref 0.66–1.25)
CREAT SERPL-MCNC: 1.34 MG/DL (ref 0.66–1.25)
CREAT SERPL-MCNC: 1.34 MG/DL (ref 0.7–1.3)
CREAT SERPL-MCNC: 1.34 MG/DL (ref 0.7–1.3)
CREAT SERPL-MCNC: 1.36 MG/DL (ref 0.66–1.25)
CREAT SERPL-MCNC: 1.39 MG/DL (ref 0.66–1.25)
CREAT SERPL-MCNC: 1.43 MG/DL (ref 0.66–1.25)
CREAT SERPL-MCNC: 1.45 MG/DL (ref 0.66–1.25)
CREAT SERPL-MCNC: 1.53 MG/DL (ref 0.66–1.25)
CRP SERPL-MCNC: 15 MG/L (ref 0–8)
CRP SERPL-MCNC: 15 MG/L (ref 0–8)
DIFFERENTIAL METHOD BLD: ABNORMAL
DIFFERENTIAL METHOD BLD: ABNORMAL
EOSINOPHIL # BLD AUTO: 0.2 10E9/L (ref 0–0.7)
EOSINOPHIL # BLD AUTO: 0.2 10E9/L (ref 0–0.7)
EOSINOPHIL NFR BLD AUTO: 6.8 %
EOSINOPHIL NFR BLD AUTO: 7.7 %
ERYTHROCYTE [DISTWIDTH] IN BLOOD BY AUTOMATED COUNT: 15.4 % (ref 11–14.5)
ERYTHROCYTE [DISTWIDTH] IN BLOOD BY AUTOMATED COUNT: 15.4 % (ref 11–14.5)
ERYTHROCYTE [DISTWIDTH] IN BLOOD BY AUTOMATED COUNT: 15.9 % (ref 10–15)
ERYTHROCYTE [DISTWIDTH] IN BLOOD BY AUTOMATED COUNT: 16 % (ref 10–15)
ERYTHROCYTE [DISTWIDTH] IN BLOOD BY AUTOMATED COUNT: 16 % (ref 10–15)
FLUAV+FLUBV AG SPEC QL: NEGATIVE
FLUAV+FLUBV AG SPEC QL: NEGATIVE
GFR SERPL CREATININE-BSD FRML MDRD: 52 ML/MIN/{1.73_M2}
GFR SERPL CREATININE-BSD FRML MDRD: 56 ML/MIN/1.73M2
GFR SERPL CREATININE-BSD FRML MDRD: 56 ML/MIN/1.73M2
GFR SERPL CREATININE-BSD FRML MDRD: 56 ML/MIN/{1.73_M2}
GFR SERPL CREATININE-BSD FRML MDRD: 57 ML/MIN/{1.73_M2}
GFR SERPL CREATININE-BSD FRML MDRD: 59 ML/MIN/{1.73_M2}
GFR SERPL CREATININE-BSD FRML MDRD: 60 ML/MIN/{1.73_M2}
GFR SERPL CREATININE-BSD FRML MDRD: 61 ML/MIN/{1.73_M2}
GFR SERPL CREATININE-BSD FRML MDRD: 65 ML/MIN/{1.73_M2}
GFR SERPL CREATININE-BSD FRML MDRD: 69 ML/MIN/{1.73_M2}
GLUCOSE BLD-MCNC: 196 MG/DL (ref 70–125)
GLUCOSE BLDC GLUCOMTR-MCNC: 102 MG/DL (ref 70–99)
GLUCOSE BLDC GLUCOMTR-MCNC: 106 MG/DL (ref 70–99)
GLUCOSE BLDC GLUCOMTR-MCNC: 108 MG/DL (ref 70–99)
GLUCOSE BLDC GLUCOMTR-MCNC: 109 MG/DL (ref 70–99)
GLUCOSE BLDC GLUCOMTR-MCNC: 111 MG/DL (ref 70–99)
GLUCOSE BLDC GLUCOMTR-MCNC: 112 MG/DL (ref 70–99)
GLUCOSE BLDC GLUCOMTR-MCNC: 112 MG/DL (ref 70–99)
GLUCOSE BLDC GLUCOMTR-MCNC: 114 MG/DL (ref 70–99)
GLUCOSE BLDC GLUCOMTR-MCNC: 115 MG/DL (ref 70–99)
GLUCOSE BLDC GLUCOMTR-MCNC: 117 MG/DL (ref 70–99)
GLUCOSE BLDC GLUCOMTR-MCNC: 120 MG/DL (ref 70–99)
GLUCOSE BLDC GLUCOMTR-MCNC: 127 MG/DL (ref 70–99)
GLUCOSE BLDC GLUCOMTR-MCNC: 128 MG/DL (ref 70–99)
GLUCOSE BLDC GLUCOMTR-MCNC: 130 MG/DL (ref 70–99)
GLUCOSE BLDC GLUCOMTR-MCNC: 131 MG/DL (ref 70–99)
GLUCOSE BLDC GLUCOMTR-MCNC: 135 MG/DL (ref 70–99)
GLUCOSE BLDC GLUCOMTR-MCNC: 136 MG/DL (ref 70–99)
GLUCOSE BLDC GLUCOMTR-MCNC: 137 MG/DL (ref 70–99)
GLUCOSE BLDC GLUCOMTR-MCNC: 144 MG/DL (ref 70–99)
GLUCOSE BLDC GLUCOMTR-MCNC: 150 MG/DL (ref 70–99)
GLUCOSE BLDC GLUCOMTR-MCNC: 161 MG/DL (ref 70–99)
GLUCOSE BLDC GLUCOMTR-MCNC: 168 MG/DL (ref 70–99)
GLUCOSE BLDC GLUCOMTR-MCNC: 169 MG/DL (ref 70–99)
GLUCOSE BLDC GLUCOMTR-MCNC: 171 MG/DL (ref 70–99)
GLUCOSE BLDC GLUCOMTR-MCNC: 172 MG/DL (ref 70–99)
GLUCOSE BLDC GLUCOMTR-MCNC: 176 MG/DL (ref 70–99)
GLUCOSE BLDC GLUCOMTR-MCNC: 182 MG/DL (ref 70–99)
GLUCOSE BLDC GLUCOMTR-MCNC: 184 MG/DL (ref 70–99)
GLUCOSE BLDC GLUCOMTR-MCNC: 204 MG/DL (ref 70–99)
GLUCOSE BLDC GLUCOMTR-MCNC: 235 MG/DL (ref 70–99)
GLUCOSE BLDC GLUCOMTR-MCNC: 240 MG/DL (ref 70–99)
GLUCOSE BLDC GLUCOMTR-MCNC: 241 MG/DL (ref 70–99)
GLUCOSE BLDC GLUCOMTR-MCNC: 255 MG/DL (ref 70–99)
GLUCOSE BLDC GLUCOMTR-MCNC: 92 MG/DL (ref 70–99)
GLUCOSE BLDC GLUCOMTR-MCNC: 95 MG/DL (ref 70–99)
GLUCOSE FLD-MCNC: 167 MG/DL
GLUCOSE SERPL-MCNC: 104 MG/DL (ref 70–99)
GLUCOSE SERPL-MCNC: 106 MG/DL (ref 70–99)
GLUCOSE SERPL-MCNC: 110 MG/DL (ref 70–99)
GLUCOSE SERPL-MCNC: 129 MG/DL (ref 70–99)
GLUCOSE SERPL-MCNC: 144 MG/DL (ref 70–99)
GLUCOSE SERPL-MCNC: 196 MG/DL (ref 70–125)
GLUCOSE SERPL-MCNC: 98 MG/DL (ref 70–99)
GLUCOSE UR STRIP-MCNC: NEGATIVE MG/DL
GRAM STN SPEC: NORMAL
HCT VFR BLD AUTO: 28 % (ref 40–53)
HCT VFR BLD AUTO: 31.9 % (ref 40–53)
HCT VFR BLD AUTO: 32.5 % (ref 40–53)
HCT VFR BLD AUTO: 36.2 % (ref 40–54)
HCT VFR BLD AUTO: 36.2 % (ref 40–54)
HEMOGLOBIN: 11.8 G/DL (ref 14–18)
HGB BLD-MCNC: 10.1 G/DL (ref 13.3–17.7)
HGB BLD-MCNC: 10.4 G/DL (ref 13.3–17.7)
HGB BLD-MCNC: 10.9 G/DL (ref 13.3–17.7)
HGB BLD-MCNC: 11.8 G/DL (ref 14–18)
HGB BLD-MCNC: 9.2 G/DL (ref 13.3–17.7)
HGB UR QL STRIP: NEGATIVE
IMM GRANULOCYTES # BLD: 0 10E9/L (ref 0–0.4)
IMM GRANULOCYTES # BLD: 0 10E9/L (ref 0–0.4)
IMM GRANULOCYTES NFR BLD: 0 %
IMM GRANULOCYTES NFR BLD: 0.4 %
INR PPP: 1.47 (ref 0.86–1.14)
INR PPP: 1.56 (ref 0.86–1.14)
INR PPP: 1.59 (ref 0.86–1.14)
INR PPP: 1.67 (ref 0.86–1.14)
INR PPP: 1.68 (ref 0.86–1.14)
INR PPP: 1.68 (ref 0.86–1.14)
INR PPP: 1.74 (ref 0.86–1.14)
INTERPRETATION ECG - MUSE: NORMAL
KETONES UR STRIP-MCNC: NEGATIVE MG/DL
LDH FLD L TO P-CCNC: 42 U/L
LDH SERPL L TO P-CCNC: 146 U/L (ref 85–227)
LEUKOCYTE ESTERASE UR QL STRIP: NEGATIVE
LEVETIRACETAM (KEPPRA): 84.5 UG/ML (ref 6–46)
LEVETIRACETAM SERPL-MCNC: 9 UG/ML (ref 12–46)
LEVETIRACETAM SERPL-MCNC: 97 UG/ML (ref 12–46)
LYMPHOCYTES # BLD AUTO: 0.3 10E9/L (ref 0.8–5.3)
LYMPHOCYTES # BLD AUTO: 0.3 10E9/L (ref 0.8–5.3)
LYMPHOCYTES NFR BLD AUTO: 10.8 %
LYMPHOCYTES NFR BLD AUTO: 12.6 %
LYMPHOCYTES NFR FLD MANUAL: 11 %
LYMPHOCYTES NFR FLD MANUAL: 32 %
Lab: NORMAL
MAGNESIUM SERPL-MCNC: 2 MG/DL (ref 1.6–2.3)
MAGNESIUM SERPL-MCNC: 2.5 MG/DL (ref 1.6–2.3)
MCH RBC QN AUTO: 32.9 PG (ref 26.5–33)
MCH RBC QN AUTO: 33 PG (ref 27–34)
MCH RBC QN AUTO: 33 PG (ref 27–34)
MCH RBC QN AUTO: 33.6 PG (ref 26.5–33)
MCH RBC QN AUTO: 33.8 PG (ref 26.5–33)
MCHC RBC AUTO-ENTMCNC: 32.6 G/DL (ref 31.5–36.5)
MCHC RBC AUTO-ENTMCNC: 32.6 G/DL (ref 32–36)
MCHC RBC AUTO-ENTMCNC: 32.6 G/DL (ref 32–36)
MCHC RBC AUTO-ENTMCNC: 32.9 G/DL (ref 31.5–36.5)
MCHC RBC AUTO-ENTMCNC: 33.5 G/DL (ref 31.5–36.5)
MCV RBC AUTO: 101 FL (ref 80–100)
MCV RBC AUTO: 101 FL (ref 80–100)
MCV RBC AUTO: 102 FL (ref 78–100)
MCV RBC AUTO: 104 FL (ref 78–100)
MCV RBC AUTO: 98 FL (ref 78–100)
MONOCYTES # BLD AUTO: 0.2 10E9/L (ref 0–1.3)
MONOCYTES # BLD AUTO: 0.3 10E9/L (ref 0–1.3)
MONOCYTES NFR BLD AUTO: 10.1 %
MONOCYTES NFR BLD AUTO: 12.4 %
MUCOUS THREADS #/AREA URNS LPF: PRESENT /LPF
NEUTROPHILS # BLD AUTO: 1.4 10E9/L (ref 1.6–8.3)
NEUTROPHILS # BLD AUTO: 1.7 10E9/L (ref 1.6–8.3)
NEUTROPHILS NFR BLD AUTO: 68.6 %
NEUTROPHILS NFR BLD AUTO: 69.2 %
NEUTS BAND NFR FLD MANUAL: 12 %
NEUTS BAND NFR FLD MANUAL: 7 %
NITRATE UR QL: NEGATIVE
NRBC # BLD AUTO: 0 10*3/UL
NRBC # BLD AUTO: 0 10*3/UL
NRBC BLD AUTO-RTO: 0 /100
NRBC BLD AUTO-RTO: 0 /100
NUM BPU REQUESTED: 1
OTHER CELLS FLD MANUAL: 56 %
OTHER CELLS FLD MANUAL: 82 %
PH UR STRIP: 5.5 PH (ref 5–7)
PLATELET # BLD AUTO: 30 10E9/L (ref 150–450)
PLATELET # BLD AUTO: 34 10E9/L (ref 150–450)
PLATELET # BLD AUTO: 35 10E9/L (ref 150–450)
PLATELET # BLD AUTO: 40 THOU/UL (ref 140–440)
PLATELET # BLD AUTO: 40 THOU/UL (ref 140–440)
PLATELET # BLD AUTO: 43 10E9/L (ref 150–450)
PMV BLD AUTO: 12.7 FL (ref 8.5–12.5)
POTASSIUM BLD-SCNC: 3.3 MMOL/L (ref 3.5–5)
POTASSIUM SERPL-SCNC: 3.2 MMOL/L (ref 3.4–5.3)
POTASSIUM SERPL-SCNC: 3.3 MMOL/L (ref 3.5–5)
POTASSIUM SERPL-SCNC: 3.4 MMOL/L (ref 3.4–5.3)
POTASSIUM SERPL-SCNC: 3.5 MMOL/L (ref 3.4–5.3)
POTASSIUM SERPL-SCNC: 3.6 MMOL/L (ref 3.4–5.3)
POTASSIUM SERPL-SCNC: 3.6 MMOL/L (ref 3.4–5.3)
POTASSIUM SERPL-SCNC: 4.1 MMOL/L (ref 3.4–5.3)
PROCALCITONIN SERPL-MCNC: 0.27 NG/ML
PROCALCITONIN SERPL-MCNC: NORMAL NG/ML
PROT FLD-MCNC: 0.6 G/DL
PROT FLD-MCNC: 1.2 G/DL
PROT SERPL-MCNC: 6.2 G/DL (ref 6.8–8.8)
PROT SERPL-MCNC: 6.2 G/DL (ref 6.8–8.8)
PROT SERPL-MCNC: 6.4 G/DL (ref 6.8–8.8)
PROT SERPL-MCNC: 6.4 G/DL (ref 6.8–8.8)
PROT SERPL-MCNC: 6.6 G/DL (ref 6.8–8.8)
PROT SERPL-MCNC: 6.8 G/DL (ref 6.8–8.8)
PROT SERPL-MCNC: 6.9 G/DL (ref 6–8)
PROT SERPL-MCNC: 6.9 G/DL (ref 6–8)
RBC # BLD AUTO: 2.74 10E12/L (ref 4.4–5.9)
RBC # BLD AUTO: 3.08 10E12/L (ref 4.4–5.9)
RBC # BLD AUTO: 3.31 10E12/L (ref 4.4–5.9)
RBC # BLD AUTO: 3.58 MILL/UL (ref 4.4–6.2)
RBC # BLD AUTO: 3.58 MILL/UL (ref 4.4–6.2)
RBC #/AREA URNS AUTO: 1 /HPF (ref 0–2)
S PNEUM AG SPEC QL: NORMAL
SODIUM SERPL-SCNC: 135 MMOL/L (ref 136–145)
SODIUM SERPL-SCNC: 135 MMOL/L (ref 136–145)
SODIUM SERPL-SCNC: 137 MMOL/L (ref 133–144)
SODIUM SERPL-SCNC: 139 MMOL/L (ref 133–144)
SODIUM SERPL-SCNC: 140 MMOL/L (ref 133–144)
SODIUM SERPL-SCNC: 141 MMOL/L (ref 133–144)
SODIUM SERPL-SCNC: 142 MMOL/L (ref 133–144)
SOURCE: ABNORMAL
SP GR UR STRIP: 1.01 (ref 1–1.03)
SPECIMEN EXP DATE BLD: NORMAL
SPECIMEN SOURCE FLD: NORMAL
SPECIMEN SOURCE: NORMAL
TRANSFUSION STATUS PATIENT QL: NORMAL
TRANSFUSION STATUS PATIENT QL: NORMAL
TROPONIN I SERPL-MCNC: <0.015 UG/L (ref 0–0.04)
TSH SERPL DL<=0.005 MIU/L-ACNC: 2.83 UIU/ML (ref 0.3–5)
TSH SERPL-ACNC: 2.83 UIU/ML (ref 0.3–5)
UROBILINOGEN UR STRIP-MCNC: NORMAL MG/DL (ref 0–2)
WBC # BLD AUTO: 1.9 10E9/L (ref 4–11)
WBC # BLD AUTO: 2.1 10E9/L (ref 4–11)
WBC # BLD AUTO: 2.5 10E9/L (ref 4–11)
WBC # BLD AUTO: 2.7 THOU/UL (ref 4–11)
WBC # FLD AUTO: 202 /UL
WBC # FLD AUTO: 56 /UL
WBC #/AREA URNS AUTO: 3 /HPF (ref 0–5)
WBC: 2.7 THOU/UL (ref 4–11)

## 2019-01-01 PROCEDURE — 99232 SBSQ HOSP IP/OBS MODERATE 35: CPT | Performed by: INTERNAL MEDICINE

## 2019-01-01 PROCEDURE — 87899 AGENT NOS ASSAY W/OPTIC: CPT | Performed by: STUDENT IN AN ORGANIZED HEALTH CARE EDUCATION/TRAINING PROGRAM

## 2019-01-01 PROCEDURE — A9270 NON-COVERED ITEM OR SERVICE: HCPCS | Mod: GY

## 2019-01-01 PROCEDURE — 85025 COMPLETE CBC W/AUTO DIFF WBC: CPT

## 2019-01-01 PROCEDURE — 80053 COMPREHEN METABOLIC PANEL: CPT | Performed by: STUDENT IN AN ORGANIZED HEALTH CARE EDUCATION/TRAINING PROGRAM

## 2019-01-01 PROCEDURE — 86900 BLOOD TYPING SEROLOGIC ABO: CPT | Performed by: EMERGENCY MEDICINE

## 2019-01-01 PROCEDURE — 25000132 ZZH RX MED GY IP 250 OP 250 PS 637: Mod: GY

## 2019-01-01 PROCEDURE — 25000132 ZZH RX MED GY IP 250 OP 250 PS 637: Mod: GY | Performed by: STUDENT IN AN ORGANIZED HEALTH CARE EDUCATION/TRAINING PROGRAM

## 2019-01-01 PROCEDURE — 12000001 ZZH R&B MED SURG/OB UMMC

## 2019-01-01 PROCEDURE — 87205 SMEAR GRAM STAIN: CPT | Performed by: STUDENT IN AN ORGANIZED HEALTH CARE EDUCATION/TRAINING PROGRAM

## 2019-01-01 PROCEDURE — 93005 ELECTROCARDIOGRAM TRACING: CPT | Performed by: EMERGENCY MEDICINE

## 2019-01-01 PROCEDURE — 85018 HEMOGLOBIN: CPT | Performed by: STUDENT IN AN ORGANIZED HEALTH CARE EDUCATION/TRAINING PROGRAM

## 2019-01-01 PROCEDURE — 99233 SBSQ HOSP IP/OBS HIGH 50: CPT | Mod: GC | Performed by: INTERNAL MEDICINE

## 2019-01-01 PROCEDURE — 99309 SBSQ NF CARE MODERATE MDM 30: CPT | Performed by: NURSE PRACTITIONER

## 2019-01-01 PROCEDURE — 99223 1ST HOSP IP/OBS HIGH 75: CPT | Mod: AI | Performed by: HOSPITALIST

## 2019-01-01 PROCEDURE — 99232 SBSQ HOSP IP/OBS MODERATE 35: CPT | Mod: GC | Performed by: INTERNAL MEDICINE

## 2019-01-01 PROCEDURE — 40000556 ZZH STATISTIC PERIPHERAL IV START W US GUIDANCE

## 2019-01-01 PROCEDURE — 00000146 ZZHCL STATISTIC GLUCOSE BY METER IP

## 2019-01-01 PROCEDURE — 36415 COLL VENOUS BLD VENIPUNCTURE: CPT | Performed by: STUDENT IN AN ORGANIZED HEALTH CARE EDUCATION/TRAINING PROGRAM

## 2019-01-01 PROCEDURE — A9270 NON-COVERED ITEM OR SERVICE: HCPCS | Mod: GY | Performed by: STUDENT IN AN ORGANIZED HEALTH CARE EDUCATION/TRAINING PROGRAM

## 2019-01-01 PROCEDURE — 25000128 H RX IP 250 OP 636: Performed by: HOSPITALIST

## 2019-01-01 PROCEDURE — 97161 PT EVAL LOW COMPLEX 20 MIN: CPT | Mod: GP

## 2019-01-01 PROCEDURE — 25800030 ZZH RX IP 258 OP 636: Performed by: HOSPITALIST

## 2019-01-01 PROCEDURE — 84450 TRANSFERASE (AST) (SGOT): CPT | Performed by: STUDENT IN AN ORGANIZED HEALTH CARE EDUCATION/TRAINING PROGRAM

## 2019-01-01 PROCEDURE — 93010 ELECTROCARDIOGRAM REPORT: CPT | Mod: Z6 | Performed by: EMERGENCY MEDICINE

## 2019-01-01 PROCEDURE — 83615 LACTATE (LD) (LDH) ENZYME: CPT

## 2019-01-01 PROCEDURE — 87804 INFLUENZA ASSAY W/OPTIC: CPT | Performed by: EMERGENCY MEDICINE

## 2019-01-01 PROCEDURE — 82042 OTHER SOURCE ALBUMIN QUAN EA: CPT | Performed by: STUDENT IN AN ORGANIZED HEALTH CARE EDUCATION/TRAINING PROGRAM

## 2019-01-01 PROCEDURE — 86140 C-REACTIVE PROTEIN: CPT | Performed by: HOSPITALIST

## 2019-01-01 PROCEDURE — 85049 AUTOMATED PLATELET COUNT: CPT | Performed by: STUDENT IN AN ORGANIZED HEALTH CARE EDUCATION/TRAINING PROGRAM

## 2019-01-01 PROCEDURE — 97530 THERAPEUTIC ACTIVITIES: CPT | Mod: GP

## 2019-01-01 PROCEDURE — 82565 ASSAY OF CREATININE: CPT

## 2019-01-01 PROCEDURE — 85610 PROTHROMBIN TIME: CPT | Performed by: EMERGENCY MEDICINE

## 2019-01-01 PROCEDURE — 25000131 ZZH RX MED GY IP 250 OP 636 PS 637: Mod: GY | Performed by: STUDENT IN AN ORGANIZED HEALTH CARE EDUCATION/TRAINING PROGRAM

## 2019-01-01 PROCEDURE — P9047 ALBUMIN (HUMAN), 25%, 50ML: HCPCS | Performed by: RADIOLOGY

## 2019-01-01 PROCEDURE — 80053 COMPREHEN METABOLIC PANEL: CPT

## 2019-01-01 PROCEDURE — 82565 ASSAY OF CREATININE: CPT | Performed by: INTERNAL MEDICINE

## 2019-01-01 PROCEDURE — 80053 COMPREHEN METABOLIC PANEL: CPT | Performed by: EMERGENCY MEDICINE

## 2019-01-01 PROCEDURE — 97116 GAIT TRAINING THERAPY: CPT | Mod: GP | Performed by: PHYSICAL THERAPIST

## 2019-01-01 PROCEDURE — 99223 1ST HOSP IP/OBS HIGH 75: CPT | Performed by: NURSE PRACTITIONER

## 2019-01-01 PROCEDURE — 87070 CULTURE OTHR SPECIMN AEROBIC: CPT | Performed by: STUDENT IN AN ORGANIZED HEALTH CARE EDUCATION/TRAINING PROGRAM

## 2019-01-01 PROCEDURE — 36415 COLL VENOUS BLD VENIPUNCTURE: CPT

## 2019-01-01 PROCEDURE — 84157 ASSAY OF PROTEIN OTHER: CPT | Performed by: STUDENT IN AN ORGANIZED HEALTH CARE EDUCATION/TRAINING PROGRAM

## 2019-01-01 PROCEDURE — 85610 PROTHROMBIN TIME: CPT | Performed by: INTERNAL MEDICINE

## 2019-01-01 PROCEDURE — 25000128 H RX IP 250 OP 636: Performed by: STUDENT IN AN ORGANIZED HEALTH CARE EDUCATION/TRAINING PROGRAM

## 2019-01-01 PROCEDURE — P9037 PLATE PHERES LEUKOREDU IRRAD: HCPCS | Performed by: STUDENT IN AN ORGANIZED HEALTH CARE EDUCATION/TRAINING PROGRAM

## 2019-01-01 PROCEDURE — 85610 PROTHROMBIN TIME: CPT

## 2019-01-01 PROCEDURE — 27211039 IR PARACENTESIS

## 2019-01-01 PROCEDURE — 36415 COLL VENOUS BLD VENIPUNCTURE: CPT | Performed by: HOSPITALIST

## 2019-01-01 PROCEDURE — 80177 DRUG SCRN QUAN LEVETIRACETAM: CPT | Performed by: EMERGENCY MEDICINE

## 2019-01-01 PROCEDURE — 97530 THERAPEUTIC ACTIVITIES: CPT | Mod: GP | Performed by: PHYSICAL THERAPIST

## 2019-01-01 PROCEDURE — 84295 ASSAY OF SERUM SODIUM: CPT | Performed by: INTERNAL MEDICINE

## 2019-01-01 PROCEDURE — 83735 ASSAY OF MAGNESIUM: CPT | Performed by: INTERNAL MEDICINE

## 2019-01-01 PROCEDURE — 87015 SPECIMEN INFECT AGNT CONCNTJ: CPT | Performed by: STUDENT IN AN ORGANIZED HEALTH CARE EDUCATION/TRAINING PROGRAM

## 2019-01-01 PROCEDURE — 32555 ASPIRATE PLEURA W/ IMAGING: CPT

## 2019-01-01 PROCEDURE — 87040 BLOOD CULTURE FOR BACTERIA: CPT | Performed by: EMERGENCY MEDICINE

## 2019-01-01 PROCEDURE — 99239 HOSP IP/OBS DSCHRG MGMT >30: CPT | Mod: GC | Performed by: INTERNAL MEDICINE

## 2019-01-01 PROCEDURE — 99285 EMERGENCY DEPT VISIT HI MDM: CPT | Mod: 25 | Performed by: EMERGENCY MEDICINE

## 2019-01-01 PROCEDURE — 40000141 ZZH STATISTIC PERIPHERAL IV START W/O US GUIDANCE

## 2019-01-01 PROCEDURE — 84484 ASSAY OF TROPONIN QUANT: CPT | Performed by: EMERGENCY MEDICINE

## 2019-01-01 PROCEDURE — 25000128 H RX IP 250 OP 636: Performed by: INTERNAL MEDICINE

## 2019-01-01 PROCEDURE — 99310 SBSQ NF CARE HIGH MDM 45: CPT | Performed by: NURSE PRACTITIONER

## 2019-01-01 PROCEDURE — G0463 HOSPITAL OUTPT CLINIC VISIT: HCPCS | Mod: ZF

## 2019-01-01 PROCEDURE — 82945 GLUCOSE OTHER FLUID: CPT | Performed by: STUDENT IN AN ORGANIZED HEALTH CARE EDUCATION/TRAINING PROGRAM

## 2019-01-01 PROCEDURE — 0W9G3ZZ DRAINAGE OF PERITONEAL CAVITY, PERCUTANEOUS APPROACH: ICD-10-PCS | Performed by: RADIOLOGY

## 2019-01-01 PROCEDURE — 25000128 H RX IP 250 OP 636: Performed by: RADIOLOGY

## 2019-01-01 PROCEDURE — 71046 X-RAY EXAM CHEST 2 VIEWS: CPT

## 2019-01-01 PROCEDURE — 81001 URINALYSIS AUTO W/SCOPE: CPT | Performed by: EMERGENCY MEDICINE

## 2019-01-01 PROCEDURE — 89051 BODY FLUID CELL COUNT: CPT | Performed by: STUDENT IN AN ORGANIZED HEALTH CARE EDUCATION/TRAINING PROGRAM

## 2019-01-01 PROCEDURE — 82248 BILIRUBIN DIRECT: CPT

## 2019-01-01 PROCEDURE — 71045 X-RAY EXAM CHEST 1 VIEW: CPT

## 2019-01-01 PROCEDURE — 36415 COLL VENOUS BLD VENIPUNCTURE: CPT | Performed by: INTERNAL MEDICINE

## 2019-01-01 PROCEDURE — P9047 ALBUMIN (HUMAN), 25%, 50ML: HCPCS

## 2019-01-01 PROCEDURE — 80048 BASIC METABOLIC PNL TOTAL CA: CPT | Performed by: STUDENT IN AN ORGANIZED HEALTH CARE EDUCATION/TRAINING PROGRAM

## 2019-01-01 PROCEDURE — 82565 ASSAY OF CREATININE: CPT | Performed by: STUDENT IN AN ORGANIZED HEALTH CARE EDUCATION/TRAINING PROGRAM

## 2019-01-01 PROCEDURE — 25000128 H RX IP 250 OP 636

## 2019-01-01 PROCEDURE — 83615 LACTATE (LD) (LDH) ENZYME: CPT | Performed by: STUDENT IN AN ORGANIZED HEALTH CARE EDUCATION/TRAINING PROGRAM

## 2019-01-01 PROCEDURE — 86850 RBC ANTIBODY SCREEN: CPT | Performed by: EMERGENCY MEDICINE

## 2019-01-01 PROCEDURE — 84075 ASSAY ALKALINE PHOSPHATASE: CPT | Performed by: STUDENT IN AN ORGANIZED HEALTH CARE EDUCATION/TRAINING PROGRAM

## 2019-01-01 PROCEDURE — 85027 COMPLETE CBC AUTOMATED: CPT

## 2019-01-01 PROCEDURE — 82247 BILIRUBIN TOTAL: CPT | Performed by: STUDENT IN AN ORGANIZED HEALTH CARE EDUCATION/TRAINING PROGRAM

## 2019-01-01 PROCEDURE — 82140 ASSAY OF AMMONIA: CPT | Performed by: HOSPITALIST

## 2019-01-01 PROCEDURE — 0W993ZZ DRAINAGE OF RIGHT PLEURAL CAVITY, PERCUTANEOUS APPROACH: ICD-10-PCS | Performed by: RADIOLOGY

## 2019-01-01 PROCEDURE — 99356 ZZC PROLONGED SERV,INPATIENT,1ST HR: CPT | Performed by: NURSE PRACTITIONER

## 2019-01-01 PROCEDURE — 99316 NF DSCHRG MGMT 30 MIN+: CPT | Performed by: NURSE PRACTITIONER

## 2019-01-01 PROCEDURE — 86140 C-REACTIVE PROTEIN: CPT

## 2019-01-01 PROCEDURE — 85610 PROTHROMBIN TIME: CPT | Performed by: STUDENT IN AN ORGANIZED HEALTH CARE EDUCATION/TRAINING PROGRAM

## 2019-01-01 PROCEDURE — 40000193 ZZH STATISTIC PT WARD VISIT

## 2019-01-01 PROCEDURE — 84145 PROCALCITONIN (PCT): CPT | Performed by: STUDENT IN AN ORGANIZED HEALTH CARE EDUCATION/TRAINING PROGRAM

## 2019-01-01 PROCEDURE — 83735 ASSAY OF MAGNESIUM: CPT | Performed by: STUDENT IN AN ORGANIZED HEALTH CARE EDUCATION/TRAINING PROGRAM

## 2019-01-01 PROCEDURE — 84132 ASSAY OF SERUM POTASSIUM: CPT | Performed by: INTERNAL MEDICINE

## 2019-01-01 PROCEDURE — 85025 COMPLETE CBC W/AUTO DIFF WBC: CPT | Performed by: EMERGENCY MEDICINE

## 2019-01-01 PROCEDURE — 99233 SBSQ HOSP IP/OBS HIGH 50: CPT | Performed by: NURSE PRACTITIONER

## 2019-01-01 PROCEDURE — 80076 HEPATIC FUNCTION PANEL: CPT | Performed by: INTERNAL MEDICINE

## 2019-01-01 PROCEDURE — 86901 BLOOD TYPING SEROLOGIC RH(D): CPT | Performed by: EMERGENCY MEDICINE

## 2019-01-01 PROCEDURE — 80156 ASSAY CARBAMAZEPINE TOTAL: CPT | Performed by: PSYCHIATRY & NEUROLOGY

## 2019-01-01 PROCEDURE — P9047 ALBUMIN (HUMAN), 25%, 50ML: HCPCS | Performed by: STUDENT IN AN ORGANIZED HEALTH CARE EDUCATION/TRAINING PROGRAM

## 2019-01-01 PROCEDURE — 80076 HEPATIC FUNCTION PANEL: CPT

## 2019-01-01 RX ORDER — LACTULOSE 10 G/15ML
100 SOLUTION ORAL
Status: DISCONTINUED | OUTPATIENT
Start: 2019-01-01 | End: 2019-01-01 | Stop reason: HOSPADM

## 2019-01-01 RX ORDER — SPIRONOLACTONE 50 MG/1
50 TABLET, FILM COATED ORAL DAILY
Status: DISCONTINUED | OUTPATIENT
Start: 2019-01-01 | End: 2019-01-01 | Stop reason: HOSPADM

## 2019-01-01 RX ORDER — LACTULOSE 10 G/15ML
60 SOLUTION ORAL 3 TIMES DAILY
Status: DISCONTINUED | OUTPATIENT
Start: 2019-01-01 | End: 2019-01-01 | Stop reason: HOSPADM

## 2019-01-01 RX ORDER — FOLIC ACID 1 MG/1
1 TABLET ORAL DAILY
Qty: 90 TABLET | Refills: 3 | Status: SHIPPED | OUTPATIENT
Start: 2019-01-01 | End: 2019-01-01

## 2019-01-01 RX ORDER — METHYLPHENIDATE HYDROCHLORIDE 10 MG/1
5 TABLET ORAL 2 TIMES DAILY
Qty: 60 TABLET | Refills: 0 | Status: SHIPPED | DISCHARGE
Start: 2019-01-01 | End: 2019-01-01

## 2019-01-01 RX ORDER — GABAPENTIN 100 MG/1
200 CAPSULE ORAL 3 TIMES DAILY
Qty: 90 CAPSULE | Refills: 2 | DISCHARGE
Start: 2019-01-01 | End: 2019-01-01 | Stop reason: DRUGHIGH

## 2019-01-01 RX ORDER — MIDODRINE HYDROCHLORIDE 2.5 MG/1
5 TABLET ORAL ONCE
Status: COMPLETED | OUTPATIENT
Start: 2019-01-01 | End: 2019-01-01

## 2019-01-01 RX ORDER — TRAZODONE HYDROCHLORIDE 50 MG/1
50 TABLET, FILM COATED ORAL AT BEDTIME
Status: DISCONTINUED | OUTPATIENT
Start: 2019-01-01 | End: 2019-01-01 | Stop reason: HOSPADM

## 2019-01-01 RX ORDER — LEVOTHYROXINE SODIUM 88 UG/1
88 TABLET ORAL DAILY
Status: DISCONTINUED | OUTPATIENT
Start: 2019-01-01 | End: 2019-01-01 | Stop reason: HOSPADM

## 2019-01-01 RX ORDER — ALBUMIN (HUMAN) 12.5 G/50ML
50 SOLUTION INTRAVENOUS ONCE
Status: DISCONTINUED | OUTPATIENT
Start: 2019-01-01 | End: 2019-01-01

## 2019-01-01 RX ORDER — DIAPER,BRIEF,INFANT-TODD,DISP
EACH MISCELLANEOUS 2 TIMES DAILY
Status: DISCONTINUED | OUTPATIENT
Start: 2019-01-01 | End: 2019-01-01 | Stop reason: HOSPADM

## 2019-01-01 RX ORDER — HEPARIN SODIUM,PORCINE 10 UNIT/ML
5-10 VIAL (ML) INTRAVENOUS
Status: DISCONTINUED | OUTPATIENT
Start: 2019-01-01 | End: 2019-01-01 | Stop reason: HOSPADM

## 2019-01-01 RX ORDER — LEVETIRACETAM 750 MG/1
750 TABLET ORAL 2 TIMES DAILY
Qty: 60 TABLET | Refills: 11 | Status: SHIPPED | OUTPATIENT
Start: 2019-01-01 | End: 2019-01-01

## 2019-01-01 RX ORDER — HEPARIN SODIUM,PORCINE 10 UNIT/ML
5-10 VIAL (ML) INTRAVENOUS EVERY 24 HOURS
Status: DISCONTINUED | OUTPATIENT
Start: 2019-01-01 | End: 2019-01-01 | Stop reason: HOSPADM

## 2019-01-01 RX ORDER — GABAPENTIN 100 MG/1
200 CAPSULE ORAL 3 TIMES DAILY
Status: DISCONTINUED | OUTPATIENT
Start: 2019-01-01 | End: 2019-01-01 | Stop reason: HOSPADM

## 2019-01-01 RX ORDER — MICONAZOLE NITRATE 20 MG/G
CREAM TOPICAL 2 TIMES DAILY
Status: DISCONTINUED | OUTPATIENT
Start: 2019-01-01 | End: 2019-01-01 | Stop reason: HOSPADM

## 2019-01-01 RX ORDER — FUROSEMIDE 20 MG
20 TABLET ORAL DAILY
Qty: 30 TABLET | Refills: 3 | Status: SHIPPED | OUTPATIENT
Start: 2019-01-01

## 2019-01-01 RX ORDER — MIRTAZAPINE 30 MG/1
30 TABLET, FILM COATED ORAL AT BEDTIME
Qty: 30 TABLET | Refills: 0 | DISCHARGE
Start: 2019-01-01 | End: 2019-01-01

## 2019-01-01 RX ORDER — LACTULOSE 10 G/15ML
60 SOLUTION ORAL 3 TIMES DAILY
DISCHARGE
Start: 2019-01-01 | End: 2019-01-01

## 2019-01-01 RX ORDER — MIDODRINE HYDROCHLORIDE 5 MG/1
15 TABLET ORAL 3 TIMES DAILY
COMMUNITY

## 2019-01-01 RX ORDER — MIRTAZAPINE 15 MG/1
30 TABLET, FILM COATED ORAL AT BEDTIME
Status: DISCONTINUED | OUTPATIENT
Start: 2019-01-01 | End: 2019-01-01 | Stop reason: HOSPADM

## 2019-01-01 RX ORDER — METHYLPHENIDATE HYDROCHLORIDE 10 MG/1
10 TABLET ORAL 2 TIMES DAILY
Qty: 60 TABLET | Refills: 0 | Status: SHIPPED | OUTPATIENT
Start: 2019-01-01 | End: 2019-01-01

## 2019-01-01 RX ORDER — PRAVASTATIN SODIUM 40 MG
80 TABLET ORAL DAILY
Status: DISCONTINUED | OUTPATIENT
Start: 2019-01-01 | End: 2019-01-01

## 2019-01-01 RX ORDER — FUROSEMIDE 20 MG
20 TABLET ORAL DAILY
Status: DISCONTINUED | OUTPATIENT
Start: 2019-01-01 | End: 2019-01-01

## 2019-01-01 RX ORDER — MIRTAZAPINE 30 MG/1
30 TABLET, FILM COATED ORAL AT BEDTIME
COMMUNITY

## 2019-01-01 RX ORDER — POTASSIUM CHLORIDE 29.8 MG/ML
20 INJECTION INTRAVENOUS
Status: DISCONTINUED | OUTPATIENT
Start: 2019-01-01 | End: 2019-01-01 | Stop reason: HOSPADM

## 2019-01-01 RX ORDER — FUROSEMIDE 40 MG
40 TABLET ORAL DAILY
Status: DISCONTINUED | OUTPATIENT
Start: 2019-01-01 | End: 2019-01-01

## 2019-01-01 RX ORDER — FERROUS SULFATE 325(65) MG
325 TABLET ORAL 2 TIMES DAILY
Status: DISCONTINUED | OUTPATIENT
Start: 2019-01-01 | End: 2019-01-01 | Stop reason: HOSPADM

## 2019-01-01 RX ORDER — TRAZODONE HYDROCHLORIDE 100 MG/1
100 TABLET ORAL AT BEDTIME
Qty: 30 TABLET | Refills: 0
Start: 2019-01-01

## 2019-01-01 RX ORDER — ALBUMIN (HUMAN) 12.5 G/50ML
50 SOLUTION INTRAVENOUS ONCE
Status: COMPLETED | OUTPATIENT
Start: 2019-01-01 | End: 2019-01-01

## 2019-01-01 RX ORDER — SPIRONOLACTONE 25 MG/1
50 TABLET ORAL DAILY
Qty: 180 TABLET | Refills: 0 | Status: SHIPPED | OUTPATIENT
Start: 2019-01-01 | End: 2019-01-01

## 2019-01-01 RX ORDER — PRAVASTATIN SODIUM 80 MG/1
80 TABLET ORAL DAILY
Qty: 90 TABLET | Refills: 0 | Status: SHIPPED | OUTPATIENT
Start: 2019-01-01 | End: 2019-01-01

## 2019-01-01 RX ORDER — FOLIC ACID 1 MG/1
1 TABLET ORAL DAILY
Qty: 90 TABLET | Refills: 2 | Status: SHIPPED | OUTPATIENT
Start: 2019-01-01

## 2019-01-01 RX ORDER — POTASSIUM CHLORIDE 7.45 MG/ML
10 INJECTION INTRAVENOUS
Status: DISCONTINUED | OUTPATIENT
Start: 2019-01-01 | End: 2019-01-01 | Stop reason: HOSPADM

## 2019-01-01 RX ORDER — MIDODRINE HYDROCHLORIDE 5 MG/1
15 TABLET ORAL
Qty: 270 TABLET | Refills: 0 | DISCHARGE
Start: 2019-01-01 | End: 2019-01-01

## 2019-01-01 RX ORDER — METHYLPHENIDATE HYDROCHLORIDE 10 MG/1
10 TABLET ORAL 2 TIMES DAILY
Qty: 60 TABLET | Refills: 0 | Status: ON HOLD | OUTPATIENT
Start: 2019-01-01 | End: 2019-01-01

## 2019-01-01 RX ORDER — MIRTAZAPINE 45 MG/1
45 TABLET, FILM COATED ORAL AT BEDTIME
Qty: 30 TABLET | Refills: 2 | Status: SHIPPED | OUTPATIENT
Start: 2019-01-01 | End: 2019-01-01

## 2019-01-01 RX ORDER — TRAZODONE HYDROCHLORIDE 50 MG/1
50 TABLET, FILM COATED ORAL
Qty: 30 TABLET | Refills: 2 | Status: SHIPPED | OUTPATIENT
Start: 2019-01-01 | End: 2019-01-01

## 2019-01-01 RX ORDER — DEXTROSE MONOHYDRATE 25 G/50ML
25-50 INJECTION, SOLUTION INTRAVENOUS
Status: DISCONTINUED | OUTPATIENT
Start: 2019-01-01 | End: 2019-01-01 | Stop reason: HOSPADM

## 2019-01-01 RX ORDER — NICOTINE POLACRILEX 4 MG
15-30 LOZENGE BUCCAL
Status: CANCELLED | OUTPATIENT
Start: 2019-01-01

## 2019-01-01 RX ORDER — HYDROXYZINE HYDROCHLORIDE 10 MG/1
10 TABLET, FILM COATED ORAL 3 TIMES DAILY
Qty: 90 TABLET | Refills: 2 | Status: SHIPPED | OUTPATIENT
Start: 2019-01-01

## 2019-01-01 RX ORDER — METHYLPHENIDATE HYDROCHLORIDE 10 MG/1
5 TABLET ORAL 2 TIMES DAILY
Qty: 30 TABLET | Refills: 0 | Status: SHIPPED | OUTPATIENT
Start: 2019-01-01 | End: 2019-01-01 | Stop reason: DRUGHIGH

## 2019-01-01 RX ORDER — SPIRONOLACTONE 25 MG/1
50 TABLET ORAL DAILY
Qty: 180 TABLET | Refills: 0 | Status: SHIPPED | OUTPATIENT
Start: 2019-01-01

## 2019-01-01 RX ORDER — PHYTONADIONE 1 MG/.5ML
10 INJECTION, EMULSION INTRAMUSCULAR; INTRAVENOUS; SUBCUTANEOUS DAILY
Status: DISCONTINUED | OUTPATIENT
Start: 2019-01-01 | End: 2019-01-01

## 2019-01-01 RX ORDER — DEXTROSE MONOHYDRATE 25 G/50ML
25-50 INJECTION, SOLUTION INTRAVENOUS
Status: CANCELLED | OUTPATIENT
Start: 2019-01-01

## 2019-01-01 RX ORDER — NICOTINE POLACRILEX 4 MG
15-30 LOZENGE BUCCAL
Status: DISCONTINUED | OUTPATIENT
Start: 2019-01-01 | End: 2019-01-01 | Stop reason: HOSPADM

## 2019-01-01 RX ORDER — LEVOTHYROXINE SODIUM 88 UG/1
88 TABLET ORAL DAILY
Qty: 90 TABLET | Refills: 2 | Status: SHIPPED | OUTPATIENT
Start: 2019-01-01

## 2019-01-01 RX ORDER — DIAPER,BRIEF,INFANT-TODD,DISP
EACH MISCELLANEOUS 2 TIMES DAILY
COMMUNITY

## 2019-01-01 RX ORDER — MAGNESIUM SULFATE HEPTAHYDRATE 40 MG/ML
4 INJECTION, SOLUTION INTRAVENOUS EVERY 4 HOURS PRN
Status: DISCONTINUED | OUTPATIENT
Start: 2019-01-01 | End: 2019-01-01 | Stop reason: HOSPADM

## 2019-01-01 RX ORDER — MIDODRINE HYDROCHLORIDE 2.5 MG/1
5 TABLET ORAL EVERY 8 HOURS
Status: DISCONTINUED | OUTPATIENT
Start: 2019-01-01 | End: 2019-01-01

## 2019-01-01 RX ORDER — DEXTROSE MONOHYDRATE 25 G/50ML
25-50 INJECTION, SOLUTION INTRAVENOUS
Status: DISCONTINUED | OUTPATIENT
Start: 2019-01-01 | End: 2019-01-01

## 2019-01-01 RX ORDER — HYDROXYZINE HYDROCHLORIDE 10 MG/1
10 TABLET, FILM COATED ORAL
Status: DISCONTINUED | OUTPATIENT
Start: 2019-01-01 | End: 2019-01-01 | Stop reason: HOSPADM

## 2019-01-01 RX ORDER — GABAPENTIN 100 MG/1
CAPSULE ORAL
Qty: 90 CAPSULE | Refills: 1 | Status: SHIPPED | OUTPATIENT
Start: 2019-01-01 | End: 2019-01-01

## 2019-01-01 RX ORDER — POTASSIUM CL/LIDO/0.9 % NACL 10MEQ/0.1L
10 INTRAVENOUS SOLUTION, PIGGYBACK (ML) INTRAVENOUS
Status: DISCONTINUED | OUTPATIENT
Start: 2019-01-01 | End: 2019-01-01 | Stop reason: HOSPADM

## 2019-01-01 RX ORDER — LACTULOSE 10 G/15ML
60 SOLUTION ORAL 3 TIMES DAILY
COMMUNITY

## 2019-01-01 RX ORDER — LANOLIN ALCOHOL/MO/W.PET/CERES
100 CREAM (GRAM) TOPICAL DAILY
Status: DISCONTINUED | OUTPATIENT
Start: 2019-01-01 | End: 2019-01-01 | Stop reason: HOSPADM

## 2019-01-01 RX ORDER — ALBUMIN (HUMAN) 12.5 G/50ML
12.5 SOLUTION INTRAVENOUS 4 TIMES DAILY PRN
Status: CANCELLED
Start: 2019-01-01

## 2019-01-01 RX ORDER — IBUPROFEN 200 MG
600 TABLET ORAL 2 TIMES DAILY PRN
Status: ON HOLD | COMMUNITY
End: 2019-01-01

## 2019-01-01 RX ORDER — SPIRONOLACTONE 25 MG/1
50 TABLET ORAL DAILY
Status: DISCONTINUED | OUTPATIENT
Start: 2019-01-01 | End: 2019-01-01

## 2019-01-01 RX ORDER — METHYLPHENIDATE HYDROCHLORIDE 10 MG/1
10 TABLET ORAL 2 TIMES DAILY
Qty: 36 TABLET | Refills: 0 | Status: SHIPPED | OUTPATIENT
Start: 2019-01-01 | End: 2019-01-01

## 2019-01-01 RX ORDER — FUROSEMIDE 20 MG
20 TABLET ORAL DAILY
Status: DISCONTINUED | OUTPATIENT
Start: 2019-01-01 | End: 2019-01-01 | Stop reason: HOSPADM

## 2019-01-01 RX ORDER — LIDOCAINE 40 MG/G
CREAM TOPICAL
Status: DISCONTINUED | OUTPATIENT
Start: 2019-01-01 | End: 2019-01-01 | Stop reason: HOSPADM

## 2019-01-01 RX ORDER — NICOTINE POLACRILEX 4 MG
15-30 LOZENGE BUCCAL
Status: DISCONTINUED | OUTPATIENT
Start: 2019-01-01 | End: 2019-01-01

## 2019-01-01 RX ORDER — POTASSIUM CHLORIDE 750 MG/1
20-40 TABLET, EXTENDED RELEASE ORAL
Status: DISCONTINUED | OUTPATIENT
Start: 2019-01-01 | End: 2019-01-01 | Stop reason: HOSPADM

## 2019-01-01 RX ORDER — TRAZODONE HYDROCHLORIDE 50 MG/1
50 TABLET, FILM COATED ORAL
Status: DISCONTINUED | OUTPATIENT
Start: 2019-01-01 | End: 2019-01-01

## 2019-01-01 RX ORDER — MIDODRINE HYDROCHLORIDE 5 MG/1
15 TABLET ORAL
Status: DISCONTINUED | OUTPATIENT
Start: 2019-01-01 | End: 2019-01-01 | Stop reason: HOSPADM

## 2019-01-01 RX ORDER — METHYLPHENIDATE HYDROCHLORIDE 10 MG/1
10 TABLET ORAL 2 TIMES DAILY
Qty: 14 TABLET | Refills: 0 | Status: SHIPPED | OUTPATIENT
Start: 2019-01-01 | End: 2019-01-01

## 2019-01-01 RX ORDER — IBUPROFEN 600 MG/1
600 TABLET, FILM COATED ORAL 3 TIMES DAILY
COMMUNITY

## 2019-01-01 RX ORDER — SORBITOL SOLUTION 70 %
30 SOLUTION, ORAL MISCELLANEOUS ONCE
Status: COMPLETED | OUTPATIENT
Start: 2019-01-01 | End: 2019-01-01

## 2019-01-01 RX ORDER — FOLIC ACID 1 MG/1
1 TABLET ORAL DAILY
Status: DISCONTINUED | OUTPATIENT
Start: 2019-01-01 | End: 2019-01-01 | Stop reason: HOSPADM

## 2019-01-01 RX ORDER — TRAZODONE HYDROCHLORIDE 50 MG/1
50 TABLET, FILM COATED ORAL
Qty: 30 TABLET | Refills: 2 | Status: ON HOLD | OUTPATIENT
Start: 2019-01-01 | End: 2019-01-01

## 2019-01-01 RX ORDER — HYDROXYZINE HYDROCHLORIDE 10 MG/1
10 TABLET, FILM COATED ORAL EVERY 8 HOURS PRN
DISCHARGE
Start: 2019-01-01 | End: 2019-01-01

## 2019-01-01 RX ORDER — POTASSIUM CHLORIDE 1.5 G/1.58G
20-40 POWDER, FOR SOLUTION ORAL
Status: DISCONTINUED | OUTPATIENT
Start: 2019-01-01 | End: 2019-01-01 | Stop reason: HOSPADM

## 2019-01-01 RX ORDER — MAGNESIUM SULFATE HEPTAHYDRATE 40 MG/ML
2 INJECTION, SOLUTION INTRAVENOUS DAILY PRN
Status: DISCONTINUED | OUTPATIENT
Start: 2019-01-01 | End: 2019-01-01 | Stop reason: HOSPADM

## 2019-01-01 RX ORDER — ALBUMIN (HUMAN) 12.5 G/50ML
12.5-5 SOLUTION INTRAVENOUS CONTINUOUS PRN
Status: DISCONTINUED | OUTPATIENT
Start: 2019-01-01 | End: 2019-01-01 | Stop reason: HOSPADM

## 2019-01-01 RX ORDER — MIRTAZAPINE 15 MG/1
45 TABLET, FILM COATED ORAL AT BEDTIME
Status: DISCONTINUED | OUTPATIENT
Start: 2019-01-01 | End: 2019-01-01

## 2019-01-01 RX ORDER — MIDODRINE HYDROCHLORIDE 2.5 MG/1
10 TABLET ORAL
Status: DISCONTINUED | OUTPATIENT
Start: 2019-01-01 | End: 2019-01-01

## 2019-01-01 RX ORDER — LEVETIRACETAM 750 MG/1
750 TABLET ORAL 3 TIMES DAILY
Status: DISCONTINUED | OUTPATIENT
Start: 2019-01-01 | End: 2019-01-01 | Stop reason: HOSPADM

## 2019-01-01 RX ORDER — TRAZODONE HYDROCHLORIDE 50 MG/1
50 TABLET, FILM COATED ORAL AT BEDTIME
Qty: 30 TABLET | Refills: 2 | DISCHARGE
Start: 2019-01-01 | End: 2019-01-01

## 2019-01-01 RX ORDER — LEVETIRACETAM 750 MG/1
TABLET ORAL
Qty: 60 TABLET | Refills: 11 | Status: SHIPPED | OUTPATIENT
Start: 2019-01-01 | End: 2019-01-01

## 2019-01-01 RX ORDER — GABAPENTIN 100 MG/1
CAPSULE ORAL
Qty: 90 CAPSULE | Refills: 2 | Status: ON HOLD | OUTPATIENT
Start: 2019-01-01 | End: 2019-01-01

## 2019-01-01 RX ORDER — HEPARIN SODIUM (PORCINE) LOCK FLUSH IV SOLN 100 UNIT/ML 100 UNIT/ML
5 SOLUTION INTRAVENOUS
Status: DISCONTINUED | OUTPATIENT
Start: 2019-01-01 | End: 2019-01-01 | Stop reason: HOSPADM

## 2019-01-01 RX ORDER — LANOLIN ALCOHOL/MO/W.PET/CERES
3 CREAM (GRAM) TOPICAL
COMMUNITY

## 2019-01-01 RX ORDER — CEFTRIAXONE 1 G/1
1 INJECTION, POWDER, FOR SOLUTION INTRAMUSCULAR; INTRAVENOUS EVERY 24 HOURS
Status: DISCONTINUED | OUTPATIENT
Start: 2019-01-01 | End: 2019-01-01

## 2019-01-01 RX ORDER — NALOXONE HYDROCHLORIDE 0.4 MG/ML
.1-.4 INJECTION, SOLUTION INTRAMUSCULAR; INTRAVENOUS; SUBCUTANEOUS
Status: DISCONTINUED | OUTPATIENT
Start: 2019-01-01 | End: 2019-01-01 | Stop reason: HOSPADM

## 2019-01-01 RX ORDER — LEVETIRACETAM 750 MG/1
750 TABLET ORAL 3 TIMES DAILY
Qty: 93 TABLET | Refills: 11 | Status: SHIPPED | OUTPATIENT
Start: 2019-01-01 | End: 2019-01-01 | Stop reason: DRUGHIGH

## 2019-01-01 RX ORDER — LACTULOSE 10 G/15ML
20 SOLUTION ORAL
Status: DISCONTINUED | OUTPATIENT
Start: 2019-01-01 | End: 2019-01-01 | Stop reason: HOSPADM

## 2019-01-01 RX ORDER — DIAPER,BRIEF,INFANT-TODD,DISP
EACH MISCELLANEOUS 2 TIMES DAILY
DISCHARGE
Start: 2019-01-01 | End: 2019-01-01

## 2019-01-01 RX ORDER — METHYLPHENIDATE HYDROCHLORIDE 5 MG/1
5 TABLET ORAL 2 TIMES DAILY
Status: DISCONTINUED | OUTPATIENT
Start: 2019-01-01 | End: 2019-01-01 | Stop reason: HOSPADM

## 2019-01-01 RX ORDER — MULTIPLE VITAMINS W/ MINERALS TAB 9MG-400MCG
1 TAB ORAL 2 TIMES DAILY
Status: DISCONTINUED | OUTPATIENT
Start: 2019-01-01 | End: 2019-01-01 | Stop reason: HOSPADM

## 2019-01-01 RX ORDER — HYDROXYZINE HYDROCHLORIDE 10 MG/1
10 TABLET, FILM COATED ORAL 3 TIMES DAILY
Qty: 90 TABLET | Refills: 3 | Status: SHIPPED | OUTPATIENT
Start: 2019-01-01 | End: 2019-01-01

## 2019-01-01 RX ORDER — ALBUMIN (HUMAN) 12.5 G/50ML
25 SOLUTION INTRAVENOUS ONCE
Status: COMPLETED | OUTPATIENT
Start: 2019-01-01 | End: 2019-01-01

## 2019-01-01 RX ORDER — MIRTAZAPINE 45 MG/1
45 TABLET, FILM COATED ORAL AT BEDTIME
Qty: 30 TABLET | Refills: 2 | Status: ON HOLD | OUTPATIENT
Start: 2019-01-01 | End: 2019-01-01

## 2019-01-01 RX ORDER — LACTULOSE 10 G/15ML
15 SOLUTION ORAL DAILY PRN
COMMUNITY

## 2019-01-01 RX ADMIN — GABAPENTIN 200 MG: 100 CAPSULE ORAL at 14:08

## 2019-01-01 RX ADMIN — LEVETIRACETAM 750 MG: 750 TABLET, FILM COATED ORAL at 01:21

## 2019-01-01 RX ADMIN — FERROUS SULFATE TAB 325 MG (65 MG ELEMENTAL FE) 325 MG: 325 (65 FE) TAB at 20:33

## 2019-01-01 RX ADMIN — RIFAXIMIN 550 MG: 550 TABLET ORAL at 20:32

## 2019-01-01 RX ADMIN — MULTIPLE VITAMINS W/ MINERALS TAB 1 TABLET: TAB at 19:44

## 2019-01-01 RX ADMIN — OMEPRAZOLE 40 MG: 20 CAPSULE, DELAYED RELEASE ORAL at 08:58

## 2019-01-01 RX ADMIN — ALBUMIN HUMAN 50 G: 0.25 SOLUTION INTRAVENOUS at 15:22

## 2019-01-01 RX ADMIN — METHYLPHENIDATE HYDROCHLORIDE 5 MG: 5 TABLET ORAL at 19:59

## 2019-01-01 RX ADMIN — MIRTAZAPINE 30 MG: 15 TABLET, FILM COATED ORAL at 22:39

## 2019-01-01 RX ADMIN — OMEPRAZOLE 40 MG: 20 CAPSULE, DELAYED RELEASE ORAL at 19:24

## 2019-01-01 RX ADMIN — FERROUS SULFATE TAB 325 MG (65 MG ELEMENTAL FE) 325 MG: 325 (65 FE) TAB at 09:45

## 2019-01-01 RX ADMIN — RIFAXIMIN 550 MG: 550 TABLET ORAL at 19:40

## 2019-01-01 RX ADMIN — LEVETIRACETAM 750 MG: 750 TABLET, FILM COATED ORAL at 09:43

## 2019-01-01 RX ADMIN — MICONAZOLE NITRATE: 20 CREAM TOPICAL at 20:32

## 2019-01-01 RX ADMIN — CEFTRIAXONE 1 G: 1 INJECTION, POWDER, FOR SOLUTION INTRAMUSCULAR; INTRAVENOUS at 23:43

## 2019-01-01 RX ADMIN — INSULIN ASPART 3 UNITS: 100 INJECTION, SOLUTION INTRAVENOUS; SUBCUTANEOUS at 14:10

## 2019-01-01 RX ADMIN — TRAZODONE HYDROCHLORIDE 50 MG: 50 TABLET ORAL at 21:38

## 2019-01-01 RX ADMIN — FUROSEMIDE 20 MG: 20 TABLET ORAL at 08:58

## 2019-01-01 RX ADMIN — Medication 5 MG: at 22:10

## 2019-01-01 RX ADMIN — METHYLPHENIDATE HYDROCHLORIDE 5 MG: 5 TABLET ORAL at 19:23

## 2019-01-01 RX ADMIN — LACTULOSE 60 ML: 20 SOLUTION ORAL at 20:38

## 2019-01-01 RX ADMIN — MICONAZOLE NITRATE: 20 CREAM TOPICAL at 10:04

## 2019-01-01 RX ADMIN — LEVETIRACETAM 750 MG: 750 TABLET, FILM COATED ORAL at 09:46

## 2019-01-01 RX ADMIN — HYDROXYZINE HYDROCHLORIDE 10 MG: 10 TABLET ORAL at 12:59

## 2019-01-01 RX ADMIN — HYDROXYZINE HYDROCHLORIDE 10 MG: 10 TABLET ORAL at 17:21

## 2019-01-01 RX ADMIN — LEVETIRACETAM 750 MG: 750 TABLET, FILM COATED ORAL at 20:32

## 2019-01-01 RX ADMIN — MIRTAZAPINE 30 MG: 15 TABLET, FILM COATED ORAL at 22:11

## 2019-01-01 RX ADMIN — LEVETIRACETAM 750 MG: 750 TABLET, FILM COATED ORAL at 14:08

## 2019-01-01 RX ADMIN — HYDROXYZINE HYDROCHLORIDE 10 MG: 10 TABLET ORAL at 17:43

## 2019-01-01 RX ADMIN — LACTULOSE 60 ML: 20 SOLUTION ORAL at 09:41

## 2019-01-01 RX ADMIN — VITAMIN D, TAB 1000IU (100/BT) 1000 UNITS: 25 TAB at 09:25

## 2019-01-01 RX ADMIN — RIFAXIMIN 550 MG: 550 TABLET ORAL at 09:45

## 2019-01-01 RX ADMIN — GABAPENTIN 200 MG: 100 CAPSULE ORAL at 08:38

## 2019-01-01 RX ADMIN — INSULIN ASPART 1 UNITS: 100 INJECTION, SOLUTION INTRAVENOUS; SUBCUTANEOUS at 18:09

## 2019-01-01 RX ADMIN — MIRTAZAPINE 45 MG: 15 TABLET, FILM COATED ORAL at 00:09

## 2019-01-01 RX ADMIN — POTASSIUM CHLORIDE 20 MEQ: 750 TABLET, EXTENDED RELEASE ORAL at 14:01

## 2019-01-01 RX ADMIN — LEVETIRACETAM 750 MG: 750 TABLET, FILM COATED ORAL at 13:49

## 2019-01-01 RX ADMIN — GABAPENTIN 200 MG: 100 CAPSULE ORAL at 08:58

## 2019-01-01 RX ADMIN — HYDROCORTISONE: 0.5 CREAM TOPICAL at 20:33

## 2019-01-01 RX ADMIN — LACTULOSE 60 ML: 20 SOLUTION ORAL at 09:46

## 2019-01-01 RX ADMIN — METHYLPHENIDATE HYDROCHLORIDE 5 MG: 5 TABLET ORAL at 08:58

## 2019-01-01 RX ADMIN — FERROUS SULFATE TAB 325 MG (65 MG ELEMENTAL FE) 325 MG: 325 (65 FE) TAB at 08:58

## 2019-01-01 RX ADMIN — LACTULOSE 60 ML: 20 SOLUTION ORAL at 15:05

## 2019-01-01 RX ADMIN — Medication 100 MG: at 09:25

## 2019-01-01 RX ADMIN — FERROUS SULFATE TAB 325 MG (65 MG ELEMENTAL FE) 325 MG: 325 (65 FE) TAB at 19:44

## 2019-01-01 RX ADMIN — HYDROXYZINE HYDROCHLORIDE 10 MG: 10 TABLET ORAL at 09:43

## 2019-01-01 RX ADMIN — LEVOTHYROXINE SODIUM 88 MCG: 88 TABLET ORAL at 09:44

## 2019-01-01 RX ADMIN — MULTIPLE VITAMINS W/ MINERALS TAB 1 TABLET: TAB at 08:42

## 2019-01-01 RX ADMIN — RIFAXIMIN 550 MG: 550 TABLET ORAL at 09:24

## 2019-01-01 RX ADMIN — MIRTAZAPINE 30 MG: 15 TABLET, FILM COATED ORAL at 22:21

## 2019-01-01 RX ADMIN — MULTIPLE VITAMINS W/ MINERALS TAB 1 TABLET: TAB at 09:45

## 2019-01-01 RX ADMIN — METHYLPHENIDATE HYDROCHLORIDE 5 MG: 5 TABLET ORAL at 20:36

## 2019-01-01 RX ADMIN — HYDROXYZINE HYDROCHLORIDE 10 MG: 10 TABLET ORAL at 08:38

## 2019-01-01 RX ADMIN — MICONAZOLE NITRATE: 20 CREAM TOPICAL at 22:21

## 2019-01-01 RX ADMIN — GABAPENTIN 200 MG: 100 CAPSULE ORAL at 19:59

## 2019-01-01 RX ADMIN — MULTIPLE VITAMINS W/ MINERALS TAB 1 TABLET: TAB at 09:23

## 2019-01-01 RX ADMIN — OMEPRAZOLE 40 MG: 20 CAPSULE, DELAYED RELEASE ORAL at 09:48

## 2019-01-01 RX ADMIN — LACTULOSE 60 ML: 20 SOLUTION ORAL at 13:49

## 2019-01-01 RX ADMIN — LEVETIRACETAM 750 MG: 750 TABLET, FILM COATED ORAL at 20:43

## 2019-01-01 RX ADMIN — ALBUMIN HUMAN 12.5 G: 0.25 SOLUTION INTRAVENOUS at 13:43

## 2019-01-01 RX ADMIN — MULTIPLE VITAMINS W/ MINERALS TAB 1 TABLET: TAB at 09:42

## 2019-01-01 RX ADMIN — LEVOTHYROXINE SODIUM 88 MCG: 88 TABLET ORAL at 09:24

## 2019-01-01 RX ADMIN — GABAPENTIN 200 MG: 100 CAPSULE ORAL at 20:37

## 2019-01-01 RX ADMIN — METHYLPHENIDATE HYDROCHLORIDE 5 MG: 5 TABLET ORAL at 19:44

## 2019-01-01 RX ADMIN — FOLIC ACID 1 MG: 1 TABLET ORAL at 09:46

## 2019-01-01 RX ADMIN — OMEPRAZOLE 40 MG: 20 CAPSULE, DELAYED RELEASE ORAL at 09:43

## 2019-01-01 RX ADMIN — CARBIDOPA AND LEVODOPA 5 MG: 50; 200 TABLET, EXTENDED RELEASE ORAL at 14:01

## 2019-01-01 RX ADMIN — VITAMIN D, TAB 1000IU (100/BT) 1000 UNITS: 25 TAB at 09:46

## 2019-01-01 RX ADMIN — INSULIN ASPART 1 UNITS: 100 INJECTION, SOLUTION INTRAVENOUS; SUBCUTANEOUS at 17:43

## 2019-01-01 RX ADMIN — POTASSIUM CHLORIDE 20 MEQ: 750 TABLET, EXTENDED RELEASE ORAL at 08:58

## 2019-01-01 RX ADMIN — RIFAXIMIN 550 MG: 550 TABLET ORAL at 19:24

## 2019-01-01 RX ADMIN — LEVETIRACETAM 750 MG: 750 TABLET, FILM COATED ORAL at 14:04

## 2019-01-01 RX ADMIN — LACTULOSE 60 ML: 20 SOLUTION ORAL at 08:55

## 2019-01-01 RX ADMIN — CALCIUM CARBONATE-VITAMIN D TAB 500 MG-200 UNIT 1 TABLET: 500-200 TAB at 09:49

## 2019-01-01 RX ADMIN — POTASSIUM CHLORIDE 40 MEQ: 750 TABLET, EXTENDED RELEASE ORAL at 11:41

## 2019-01-01 RX ADMIN — VITAMIN D, TAB 1000IU (100/BT) 1000 UNITS: 25 TAB at 11:49

## 2019-01-01 RX ADMIN — LEVETIRACETAM 750 MG: 750 TABLET, FILM COATED ORAL at 13:54

## 2019-01-01 RX ADMIN — MULTIPLE VITAMINS W/ MINERALS TAB 1 TABLET: TAB at 00:09

## 2019-01-01 RX ADMIN — METHYLPHENIDATE HYDROCHLORIDE 5 MG: 5 TABLET ORAL at 08:50

## 2019-01-01 RX ADMIN — HYDROXYZINE HYDROCHLORIDE 10 MG: 10 TABLET ORAL at 12:27

## 2019-01-01 RX ADMIN — MULTIPLE VITAMINS W/ MINERALS TAB 1 TABLET: TAB at 08:58

## 2019-01-01 RX ADMIN — MICONAZOLE NITRATE: 20 CREAM TOPICAL at 10:01

## 2019-01-01 RX ADMIN — LEVETIRACETAM 750 MG: 750 TABLET, FILM COATED ORAL at 19:59

## 2019-01-01 RX ADMIN — ALBUMIN HUMAN 12.5 G: 0.25 SOLUTION INTRAVENOUS at 13:53

## 2019-01-01 RX ADMIN — INSULIN ASPART 1 UNITS: 100 INJECTION, SOLUTION INTRAVENOUS; SUBCUTANEOUS at 17:21

## 2019-01-01 RX ADMIN — HYDROXYZINE HYDROCHLORIDE 10 MG: 10 TABLET ORAL at 17:35

## 2019-01-01 RX ADMIN — Medication 100 MG: at 08:38

## 2019-01-01 RX ADMIN — MAGNESIUM SULFATE HEPTAHYDRATE 2 G: 40 INJECTION, SOLUTION INTRAVENOUS at 11:44

## 2019-01-01 RX ADMIN — Medication 100 MG: at 09:44

## 2019-01-01 RX ADMIN — PHYTONADIONE 10 MG: 10 INJECTION, EMULSION INTRAMUSCULAR; INTRAVENOUS; SUBCUTANEOUS at 09:53

## 2019-01-01 RX ADMIN — LACTULOSE 60 ML: 20 SOLUTION ORAL at 14:07

## 2019-01-01 RX ADMIN — LACTULOSE 20 G: 20 SOLUTION ORAL at 17:43

## 2019-01-01 RX ADMIN — LEVETIRACETAM 750 MG: 750 TABLET, FILM COATED ORAL at 19:24

## 2019-01-01 RX ADMIN — LEVOTHYROXINE SODIUM 88 MCG: 88 TABLET ORAL at 09:46

## 2019-01-01 RX ADMIN — HYDROCORTISONE: 0.5 CREAM TOPICAL at 09:29

## 2019-01-01 RX ADMIN — FERROUS SULFATE TAB 325 MG (65 MG ELEMENTAL FE) 325 MG: 325 (65 FE) TAB at 08:39

## 2019-01-01 RX ADMIN — METHYLPHENIDATE HYDROCHLORIDE 5 MG: 5 TABLET ORAL at 20:39

## 2019-01-01 RX ADMIN — RIFAXIMIN 550 MG: 550 TABLET ORAL at 09:48

## 2019-01-01 RX ADMIN — HYDROXYZINE HYDROCHLORIDE 10 MG: 10 TABLET ORAL at 17:30

## 2019-01-01 RX ADMIN — FUROSEMIDE 20 MG: 20 TABLET ORAL at 09:48

## 2019-01-01 RX ADMIN — FOLIC ACID 1 MG: 1 TABLET ORAL at 09:49

## 2019-01-01 RX ADMIN — FUROSEMIDE 20 MG: 20 TABLET ORAL at 11:19

## 2019-01-01 RX ADMIN — OMEPRAZOLE 40 MG: 20 CAPSULE, DELAYED RELEASE ORAL at 19:59

## 2019-01-01 RX ADMIN — PRAVASTATIN SODIUM 80 MG: 40 TABLET ORAL at 00:09

## 2019-01-01 RX ADMIN — MICONAZOLE NITRATE: 20 CREAM TOPICAL at 08:41

## 2019-01-01 RX ADMIN — LACTULOSE 60 ML: 20 SOLUTION ORAL at 08:26

## 2019-01-01 RX ADMIN — PHYTONADIONE 10 MG: 10 INJECTION, EMULSION INTRAMUSCULAR; INTRAVENOUS; SUBCUTANEOUS at 09:50

## 2019-01-01 RX ADMIN — LEVETIRACETAM 750 MG: 750 TABLET, FILM COATED ORAL at 09:49

## 2019-01-01 RX ADMIN — LEVETIRACETAM 750 MG: 750 TABLET, FILM COATED ORAL at 15:05

## 2019-01-01 RX ADMIN — HYDROXYZINE HYDROCHLORIDE 10 MG: 10 TABLET ORAL at 11:48

## 2019-01-01 RX ADMIN — LACTULOSE 60 ML: 20 SOLUTION ORAL at 08:39

## 2019-01-01 RX ADMIN — RIFAXIMIN 550 MG: 550 TABLET ORAL at 09:42

## 2019-01-01 RX ADMIN — MICONAZOLE NITRATE: 20 CREAM TOPICAL at 19:59

## 2019-01-01 RX ADMIN — SPIRONOLACTONE 50 MG: 25 TABLET ORAL at 11:19

## 2019-01-01 RX ADMIN — HYDROXYZINE HYDROCHLORIDE 10 MG: 10 TABLET ORAL at 12:00

## 2019-01-01 RX ADMIN — CALCIUM CARBONATE-VITAMIN D TAB 500 MG-200 UNIT 1 TABLET: 500-200 TAB at 09:45

## 2019-01-01 RX ADMIN — MULTIPLE VITAMINS W/ MINERALS TAB 1 TABLET: TAB at 20:32

## 2019-01-01 RX ADMIN — TRAZODONE HYDROCHLORIDE 50 MG: 50 TABLET ORAL at 22:39

## 2019-01-01 RX ADMIN — LACTULOSE 20 G: 20 SOLUTION ORAL at 18:41

## 2019-01-01 RX ADMIN — OMEPRAZOLE 40 MG: 20 CAPSULE, DELAYED RELEASE ORAL at 20:32

## 2019-01-01 RX ADMIN — FERROUS SULFATE TAB 325 MG (65 MG ELEMENTAL FE) 325 MG: 325 (65 FE) TAB at 09:25

## 2019-01-01 RX ADMIN — SPIRONOLACTONE 50 MG: 25 TABLET ORAL at 09:48

## 2019-01-01 RX ADMIN — CARBIDOPA AND LEVODOPA 5 MG: 50; 200 TABLET, EXTENDED RELEASE ORAL at 08:57

## 2019-01-01 RX ADMIN — Medication 5 MG: at 22:39

## 2019-01-01 RX ADMIN — METHYLPHENIDATE HYDROCHLORIDE 5 MG: 5 TABLET ORAL at 09:49

## 2019-01-01 RX ADMIN — LEVETIRACETAM 750 MG: 750 TABLET, FILM COATED ORAL at 09:45

## 2019-01-01 RX ADMIN — HYDROXYZINE HYDROCHLORIDE 10 MG: 10 TABLET ORAL at 08:58

## 2019-01-01 RX ADMIN — METHYLPHENIDATE HYDROCHLORIDE 5 MG: 5 TABLET ORAL at 11:49

## 2019-01-01 RX ADMIN — HYDROXYZINE HYDROCHLORIDE 10 MG: 10 TABLET ORAL at 09:48

## 2019-01-01 RX ADMIN — FERROUS SULFATE TAB 325 MG (65 MG ELEMENTAL FE) 325 MG: 325 (65 FE) TAB at 20:36

## 2019-01-01 RX ADMIN — OMEPRAZOLE 40 MG: 20 CAPSULE, DELAYED RELEASE ORAL at 19:42

## 2019-01-01 RX ADMIN — LACTULOSE 20 G: 20 SOLUTION ORAL at 11:33

## 2019-01-01 RX ADMIN — FOLIC ACID 1 MG: 1 TABLET ORAL at 09:24

## 2019-01-01 RX ADMIN — LACTULOSE 60 ML: 20 SOLUTION ORAL at 14:02

## 2019-01-01 RX ADMIN — SPIRONOLACTONE 50 MG: 50 TABLET ORAL at 10:17

## 2019-01-01 RX ADMIN — VITAMIN D, TAB 1000IU (100/BT) 1000 UNITS: 25 TAB at 08:39

## 2019-01-01 RX ADMIN — OMEPRAZOLE 40 MG: 20 CAPSULE, DELAYED RELEASE ORAL at 09:23

## 2019-01-01 RX ADMIN — LEVOTHYROXINE SODIUM 88 MCG: 88 TABLET ORAL at 08:39

## 2019-01-01 RX ADMIN — LACTULOSE 20 G: 20 SOLUTION ORAL at 03:38

## 2019-01-01 RX ADMIN — INSULIN ASPART 1 UNITS: 100 INJECTION, SOLUTION INTRAVENOUS; SUBCUTANEOUS at 12:01

## 2019-01-01 RX ADMIN — RIFAXIMIN 550 MG: 550 TABLET ORAL at 00:09

## 2019-01-01 RX ADMIN — TRAZODONE HYDROCHLORIDE 50 MG: 50 TABLET ORAL at 22:10

## 2019-01-01 RX ADMIN — LEVETIRACETAM 750 MG: 750 TABLET, FILM COATED ORAL at 08:38

## 2019-01-01 RX ADMIN — FERROUS SULFATE TAB 325 MG (65 MG ELEMENTAL FE) 325 MG: 325 (65 FE) TAB at 09:49

## 2019-01-01 RX ADMIN — LACTULOSE 60 ML: 20 SOLUTION ORAL at 17:30

## 2019-01-01 RX ADMIN — HYDROXYZINE HYDROCHLORIDE 10 MG: 10 TABLET ORAL at 12:24

## 2019-01-01 RX ADMIN — LACTULOSE 60 ML: 20 SOLUTION ORAL at 17:35

## 2019-01-01 RX ADMIN — FUROSEMIDE 20 MG: 20 TABLET ORAL at 08:39

## 2019-01-01 RX ADMIN — FERROUS SULFATE TAB 325 MG (65 MG ELEMENTAL FE) 325 MG: 325 (65 FE) TAB at 19:24

## 2019-01-01 RX ADMIN — HYDROXYZINE HYDROCHLORIDE 10 MG: 10 TABLET ORAL at 09:45

## 2019-01-01 RX ADMIN — LEVETIRACETAM 750 MG: 750 TABLET, FILM COATED ORAL at 19:42

## 2019-01-01 RX ADMIN — VITAMIN D, TAB 1000IU (100/BT) 1000 UNITS: 25 TAB at 08:58

## 2019-01-01 RX ADMIN — LACTULOSE 60 ML: 20 SOLUTION ORAL at 20:36

## 2019-01-01 RX ADMIN — INSULIN ASPART 1 UNITS: 100 INJECTION, SOLUTION INTRAVENOUS; SUBCUTANEOUS at 14:16

## 2019-01-01 RX ADMIN — FERROUS SULFATE TAB 325 MG (65 MG ELEMENTAL FE) 325 MG: 325 (65 FE) TAB at 00:09

## 2019-01-01 RX ADMIN — LACTULOSE 60 ML: 20 SOLUTION ORAL at 19:39

## 2019-01-01 RX ADMIN — LEVETIRACETAM 750 MG: 750 TABLET, FILM COATED ORAL at 09:25

## 2019-01-01 RX ADMIN — Medication 5 MG: at 21:38

## 2019-01-01 RX ADMIN — GABAPENTIN 200 MG: 100 CAPSULE ORAL at 14:00

## 2019-01-01 RX ADMIN — Medication 100 MG: at 09:48

## 2019-01-01 RX ADMIN — FERROUS SULFATE TAB 325 MG (65 MG ELEMENTAL FE) 325 MG: 325 (65 FE) TAB at 09:43

## 2019-01-01 RX ADMIN — Medication 100 MG: at 09:45

## 2019-01-01 RX ADMIN — GABAPENTIN 200 MG: 100 CAPSULE ORAL at 09:24

## 2019-01-01 RX ADMIN — MULTIPLE VITAMINS W/ MINERALS TAB 1 TABLET: TAB at 19:24

## 2019-01-01 RX ADMIN — METHYLPHENIDATE HYDROCHLORIDE 5 MG: 5 TABLET ORAL at 09:25

## 2019-01-01 RX ADMIN — OMEPRAZOLE 40 MG: 20 CAPSULE, DELAYED RELEASE ORAL at 00:09

## 2019-01-01 RX ADMIN — MICONAZOLE NITRATE: 20 CREAM TOPICAL at 09:42

## 2019-01-01 RX ADMIN — MICONAZOLE NITRATE: 20 CREAM TOPICAL at 20:37

## 2019-01-01 RX ADMIN — SORBITOL SOLUTION (BULK) 30 ML: 70 SOLUTION at 17:43

## 2019-01-01 RX ADMIN — METHYLPHENIDATE HYDROCHLORIDE 5 MG: 5 TABLET ORAL at 09:45

## 2019-01-01 RX ADMIN — MICONAZOLE NITRATE: 20 CREAM TOPICAL at 01:32

## 2019-01-01 RX ADMIN — LEVETIRACETAM 750 MG: 750 TABLET, FILM COATED ORAL at 13:48

## 2019-01-01 RX ADMIN — CEFTRIAXONE 1 G: 1 INJECTION, POWDER, FOR SOLUTION INTRAMUSCULAR; INTRAVENOUS at 01:23

## 2019-01-01 RX ADMIN — CALCIUM CARBONATE-VITAMIN D TAB 500 MG-200 UNIT 1 TABLET: 500-200 TAB at 08:58

## 2019-01-01 RX ADMIN — Medication 5 ML: at 14:40

## 2019-01-01 RX ADMIN — METHYLPHENIDATE HYDROCHLORIDE 5 MG: 5 TABLET ORAL at 20:32

## 2019-01-01 RX ADMIN — MULTIPLE VITAMINS W/ MINERALS TAB 1 TABLET: TAB at 09:46

## 2019-01-01 RX ADMIN — MIRTAZAPINE 30 MG: 15 TABLET, FILM COATED ORAL at 22:50

## 2019-01-01 RX ADMIN — CARBIDOPA AND LEVODOPA 10 MG: 50; 200 TABLET, EXTENDED RELEASE ORAL at 20:37

## 2019-01-01 RX ADMIN — PHYTONADIONE 10 MG: 10 INJECTION, EMULSION INTRAMUSCULAR; INTRAVENOUS; SUBCUTANEOUS at 09:52

## 2019-01-01 RX ADMIN — MICONAZOLE NITRATE: 20 CREAM TOPICAL at 09:49

## 2019-01-01 RX ADMIN — LEVETIRACETAM 750 MG: 750 TABLET, FILM COATED ORAL at 20:39

## 2019-01-01 RX ADMIN — MIDODRINE HYDROCHLORIDE 15 MG: 5 TABLET ORAL at 12:58

## 2019-01-01 RX ADMIN — RIFAXIMIN 550 MG: 550 TABLET ORAL at 19:59

## 2019-01-01 RX ADMIN — MICONAZOLE NITRATE: 20 CREAM TOPICAL at 20:45

## 2019-01-01 RX ADMIN — OMEPRAZOLE 40 MG: 20 CAPSULE, DELAYED RELEASE ORAL at 09:45

## 2019-01-01 RX ADMIN — RIFAXIMIN 550 MG: 550 TABLET ORAL at 08:58

## 2019-01-01 RX ADMIN — HYDROXYZINE HYDROCHLORIDE 10 MG: 10 TABLET ORAL at 17:25

## 2019-01-01 RX ADMIN — LEVOTHYROXINE SODIUM 88 MCG: 88 TABLET ORAL at 09:49

## 2019-01-01 RX ADMIN — LEVETIRACETAM 750 MG: 750 TABLET, FILM COATED ORAL at 08:58

## 2019-01-01 RX ADMIN — FOLIC ACID 1 MG: 1 TABLET ORAL at 09:45

## 2019-01-01 RX ADMIN — RIFAXIMIN 550 MG: 550 TABLET ORAL at 20:39

## 2019-01-01 RX ADMIN — OMEPRAZOLE 40 MG: 20 CAPSULE, DELAYED RELEASE ORAL at 20:36

## 2019-01-01 RX ADMIN — FOLIC ACID 1 MG: 1 TABLET ORAL at 08:58

## 2019-01-01 RX ADMIN — GABAPENTIN 200 MG: 100 CAPSULE ORAL at 20:32

## 2019-01-01 RX ADMIN — HYDROCORTISONE: 0.5 CREAM TOPICAL at 22:39

## 2019-01-01 RX ADMIN — MULTIPLE VITAMINS W/ MINERALS TAB 1 TABLET: TAB at 19:59

## 2019-01-01 RX ADMIN — CALCIUM CARBONATE-VITAMIN D TAB 500 MG-200 UNIT 1 TABLET: 500-200 TAB at 09:23

## 2019-01-01 RX ADMIN — SPIRONOLACTONE 50 MG: 25 TABLET ORAL at 08:38

## 2019-01-01 RX ADMIN — MULTIPLE VITAMINS W/ MINERALS TAB 1 TABLET: TAB at 09:48

## 2019-01-01 RX ADMIN — MULTIPLE VITAMINS W/ MINERALS TAB 1 TABLET: TAB at 20:36

## 2019-01-01 RX ADMIN — FERROUS SULFATE TAB 325 MG (65 MG ELEMENTAL FE) 325 MG: 325 (65 FE) TAB at 19:59

## 2019-01-01 RX ADMIN — FOLIC ACID 1 MG: 1 TABLET ORAL at 08:39

## 2019-01-01 RX ADMIN — MIRTAZAPINE 30 MG: 15 TABLET, FILM COATED ORAL at 21:38

## 2019-01-01 RX ADMIN — LACTULOSE 20 G: 20 POWDER, FOR SOLUTION ORAL at 00:08

## 2019-01-01 RX ADMIN — MICONAZOLE NITRATE: 20 CREAM TOPICAL at 19:47

## 2019-01-01 RX ADMIN — MULTIPLE VITAMINS W/ MINERALS TAB 1 TABLET: TAB at 20:39

## 2019-01-01 RX ADMIN — FOLIC ACID 1 MG: 1 TABLET ORAL at 09:42

## 2019-01-01 RX ADMIN — LACTULOSE 60 ML: 20 SOLUTION ORAL at 13:50

## 2019-01-01 RX ADMIN — METHYLPHENIDATE HYDROCHLORIDE 5 MG: 5 TABLET ORAL at 09:44

## 2019-01-01 RX ADMIN — RIFAXIMIN 550 MG: 550 TABLET ORAL at 08:39

## 2019-01-01 RX ADMIN — INSULIN ASPART 3 UNITS: 100 INJECTION, SOLUTION INTRAVENOUS; SUBCUTANEOUS at 12:08

## 2019-01-01 RX ADMIN — Medication 100 MG: at 08:58

## 2019-01-01 RX ADMIN — OMEPRAZOLE 40 MG: 20 CAPSULE, DELAYED RELEASE ORAL at 20:38

## 2019-01-01 RX ADMIN — HYDROXYZINE HYDROCHLORIDE 10 MG: 10 TABLET ORAL at 09:23

## 2019-01-01 RX ADMIN — RIFAXIMIN 550 MG: 550 TABLET ORAL at 20:36

## 2019-01-01 RX ADMIN — LACTULOSE 60 ML: 20 SOLUTION ORAL at 09:48

## 2019-01-01 RX ADMIN — OMEPRAZOLE 40 MG: 20 CAPSULE, DELAYED RELEASE ORAL at 08:38

## 2019-01-01 RX ADMIN — HYDROCORTISONE: 0.5 CREAM TOPICAL at 08:40

## 2019-01-01 RX ADMIN — GABAPENTIN 200 MG: 100 CAPSULE ORAL at 13:50

## 2019-01-01 RX ADMIN — HYDROCORTISONE: 0.5 CREAM TOPICAL at 20:37

## 2019-01-01 RX ADMIN — OMEPRAZOLE 40 MG: 20 CAPSULE, DELAYED RELEASE ORAL at 09:46

## 2019-01-01 RX ADMIN — MIRTAZAPINE 30 MG: 15 TABLET, FILM COATED ORAL at 22:27

## 2019-01-01 RX ADMIN — LEVOTHYROXINE SODIUM 88 MCG: 88 TABLET ORAL at 09:45

## 2019-01-01 RX ADMIN — HYDROXYZINE HYDROCHLORIDE 10 MG: 10 TABLET ORAL at 14:02

## 2019-01-01 RX ADMIN — HYDROCORTISONE: 0.5 CREAM TOPICAL at 08:59

## 2019-01-01 RX ADMIN — FERROUS SULFATE TAB 325 MG (65 MG ELEMENTAL FE) 325 MG: 325 (65 FE) TAB at 20:39

## 2019-01-01 RX ADMIN — INSULIN ASPART 3 UNITS: 100 INJECTION, SOLUTION INTRAVENOUS; SUBCUTANEOUS at 13:02

## 2019-01-01 RX ADMIN — CALCIUM CARBONATE-VITAMIN D TAB 500 MG-200 UNIT 1 TABLET: 500-200 TAB at 08:39

## 2019-01-01 RX ADMIN — ALBUMIN HUMAN 25 G: 0.25 SOLUTION INTRAVENOUS at 04:41

## 2019-01-01 RX ADMIN — CALCIUM CARBONATE-VITAMIN D TAB 500 MG-200 UNIT 1 TABLET: 500-200 TAB at 09:43

## 2019-01-01 RX ADMIN — HYDROXYZINE HYDROCHLORIDE 10 MG: 10 TABLET ORAL at 11:19

## 2019-01-01 RX ADMIN — LEVOTHYROXINE SODIUM 88 MCG: 88 TABLET ORAL at 08:58

## 2019-01-01 ASSESSMENT — MIFFLIN-ST. JEOR
SCORE: 1636.46
SCORE: 1554.81
SCORE: 1514.9
SCORE: 1474.07
SCORE: 1506.73
SCORE: 1655.51
SCORE: 1506.73
SCORE: 1481.33

## 2019-01-01 ASSESSMENT — ACTIVITIES OF DAILY LIVING (ADL)
ADLS_ACUITY_SCORE: 28
ADLS_ACUITY_SCORE: 27
ADLS_ACUITY_SCORE: 28
ADLS_ACUITY_SCORE: 27
ADLS_ACUITY_SCORE: 27
ADLS_ACUITY_SCORE: 30
ADLS_ACUITY_SCORE: 28
ADLS_ACUITY_SCORE: 27
ADLS_ACUITY_SCORE: 28
ADLS_ACUITY_SCORE: 28
ADLS_ACUITY_SCORE: 27
ADLS_ACUITY_SCORE: 28
ADLS_ACUITY_SCORE: 27
ADLS_ACUITY_SCORE: 28
ADLS_ACUITY_SCORE: 27
ADLS_ACUITY_SCORE: 28
ADLS_ACUITY_SCORE: 27
ADLS_ACUITY_SCORE: 28
ADLS_ACUITY_SCORE: 24
ADLS_ACUITY_SCORE: 28
ADLS_ACUITY_SCORE: 30
ADLS_ACUITY_SCORE: 28

## 2019-01-01 ASSESSMENT — ENCOUNTER SYMPTOMS
FEVER: 1
CONFUSION: 0
ARTHRALGIAS: 0
NECK STIFFNESS: 0
HEADACHES: 0
ABDOMINAL PAIN: 0
ABDOMINAL DISTENTION: 1
SHORTNESS OF BREATH: 0
EYE REDNESS: 0
DIFFICULTY URINATING: 0
COUGH: 1
COLOR CHANGE: 0

## 2019-01-01 ASSESSMENT — PAIN SCALES - GENERAL
PAINLEVEL: NO PAIN (0)

## 2019-01-02 NOTE — PATIENT INSTRUCTIONS
- Increase Gabapentin: Take 100mg three times daily for a week. If irritability is not improving, can then increase to 200mg three times daily. If still not improving after another week, can increase to 300mg three times daily.    - continue other medications without change    - Dr. Alcaraz will reach out to Dr. English regarding concern that the Keppra may be contributing to irritability    - Follow-up in 1 month    Thank you for coming to the PSYCHIATRY CLINIC.    Lab Testing:  If you had lab testing today and your results are reassuring or normal they will be mailed to you or sent through [employer] within 7 days.   If the lab tests need quick action we will call you with the results.  The phone number we will call with results is # [phone] (home) . If this is not the best number please call our clinic and change the number.    Medication Refills:  If you need any refills please call your pharmacy and they will contact us. Our fax number for refills is [phone]. Please allow three business for refill processing.   If you need to  your refill at a new pharmacy, please contact the new pharmacy directly. The new pharmacy will help you get your medications transferred.     Scheduling:  If you have any concerns about today's visit or wish to schedule another appointment please call our office during normal business hours [phone] (8-5:00 M-F)    Contact Us:  Please call [phone] during business hours (8-5:00 M-F).  If after clinic hours, or on the weekend, please call  [phone].    Financial Assistance [phone]  "[employer] Billing [phone]  [facility] Billing [phone]  Medical Records [phone]      MENTAL HEALTH CRISIS NUMBERS:  [facility]:   [facility] - [phone]   Crisis Residence hospitals - [facility] - [phone]   Walk-In Counseling Center hospitals - [phone]   COPE 24/7 [facility] Mobile Team for Adults - [[phone]]; Child - [[phone]]      Crisis Connection - [phone]     [facility] - [phone]   Walk-in counseling [facility] - [phone]   Walk-in counseling [facility] - [phone]   Crisis Residence [facility] - [phone]   Urgent Care Adult Mental Health:   --Drop-in, 24/7 crisis line, and [place] Mobile Team [[phone]]    CRISIS TEXT LINE: Text  from anywhere, anytime, any crisis 24/7;    OR SEE www.crisistextline.org     Poison Control Center - [phone]    CHILD: [facility] needs assessment team - [phone]     [facility] Lifeline - [phone]; or [facility] Lifeline - 1-121.112.4365    If you have a medical emergency please call 911or go to the nearest ER.                    _____________________________________________    Again thank you for choosing PSYCHIATRY CLINIC and please let us know how we can best partner with you to improve you and your family's health.  You may be receiving a survey in the mail regarding this appointment. We would love to have your feedback, both positive and negative, so please fill out the survey and return it using the provided envelope. The survey is done by an external company, so your answers are anonymous.

## 2019-01-02 NOTE — PROGRESS NOTES
"PSYCHIATRY CLINIC PROGRESS NOTE     CARE TEAM:  PCP-[place]    Specialty Providers- yes, Dr. Beatriz Tanner (GI), Lauro Shaw (Oncology)   Therapist- will be starting   Community  Team- [facility]     Mono Varghese is a 48 year old male who prefers the name Rafy/Mono & pronouns he, him, his, himself.    The initial diagnostic evaluation was on 8/13/2014.  Date of the most recent transfer of care eval is 01/02/2019.    Pertinent Background:  This patient first experienced mental health issues in childhood and has received treatment for ADHD, depression.  See transfer evaluation for detailed history.  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 Found down by wife w/ variceal bleeding and was in coma in 2016, with question of anoxic brain injury. May 2018 neuropsych testing showing global impairment with major deficits in all tested areas.     Psych critical item history includes trauma hx and substance use treatment.    INTERIM HISTORY                                                                                              4, 4   The patient reports adequate treatment adherence (medications set up by home health nurse; wife helps administer).  History was provided by the patient who was a somewhat poor historian. Patient was also accompanied by his wife Yisel, who offered additional history.  The last visit ended with no change " to the med regimen.      Since the last visit:  - Hospitalization in October for E. Coli bacteremia and in November for acute hepatic encephalopathy. Has been in a rehab facility for approximately past the 3 weeks. Current plan is for discharge in approximate 3 days. Sleep had been stopped for some time at the rehab facility, but this was stopped a week ago. During the hospitalization, also had Ritalin held for some time. Was appearing very confused until this was restarted--became more perky, more engaged. Discussed that it was difficult to tell whether the improvement was due to the Ritalin, or simply form the hepatic encephalopathy improving.     - has been having severe mood issues for approximately the past 1-2 months per Yisel. Very depressed and driscoll. Often using profanity. Not playing as many card games. Sometimes gets frustrated with rehab. This was around the time that Keppra was started.     - planning to start seeing a palliative care psychologist after discharging from the rehab facility    RECENT SYMPTOMS:   DEPRESSION:  reports-depressed mood, anhedonia, low energy, poor concentration /memory and irritability;  DENIES- suicidal ideation and self-destructive thoughts  LEN/HYPOMANIA:  reports-none;  DENIES- increased energy, decreased sleep need, increased activity and grandiosity  PSYCHOSIS:  reports-none;  DENIES- delusions, auditory hallucinations and visual hallucinations  DYSREGULATION:  reports-none;  DENIES- suicidal ideation, violent ideation and SIB  PANIC ATTACK:  none   ANXIETY:  Excessive worries  TRAUMA RELATED:  denies  SLEEP:  Sleeping on average 6-7 hours daily; feels this is going the best it has in years    EATING DISORDER: no, but struggling with eating due to poor appetite    RECENT SUBSTANCE USE:     ALCOHOL- no      TOBACCO- no     CAFFEINE- N/A  OPIOIDS- no         NARCAN KIT- no        CANNABIS- no            OTHER ILLICIT DRUGS- none      CURRENT SOCIAL HISTORY:  FINANCIAL  SUPPORT- wife Yisel works at [employer] and also serve as patient's PCA; also gets SSDI  CHILDREN- one child  in  in infancy      LIVING SITUATION- Live in [place] with wife and dog      SOCIAL/ SPIRITUAL SUPPORT-  since  (Yisel). Mother-in-law Faby also visits frequently.      FEELS SAFE AT HOME- yes      MEDICAL ROS (2,10):  Reports  weakness, unsteadiness, diarrhea and incontinence (from lactulose)     Denies headache and dizziness, oversedation    PSYCH and CD Critical Summary Points since 2018           - medical hospitalization x 4 (GI bleeding, E. 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 Also question of anoxic brain injury  (found passed out in pool of blood 2/2 variceal bleed). Seen by Neuropsych, most recently in May 2018 where he was found to have global impairment with major deficits in all assessed cognitive domains.     Patient Active Problem List   Diagnosis     Type 2 diabetes mellitus (H)     Moderate major depression (H)     LENA (obstructive sleep apnea)-moderate (AHI 16)     Oligoastrocytoma of parietal lobe (H)     ADHD (attention deficit hyperactivity disorder)     Financial difficulties     Thrombocytopenia (H)     Partial epilepsy with impairment of consciousness (H)     Cirrhosis of liver (H)     Pulmonary nodules     Myopic astigmatism of both eyes     Presbyopia     Ringing in ears, unspecified laterality     GIB (gastrointestinal bleeding)     Portal vein thrombosis     Chronic pain     Hyperlipidemia LDL goal <100     Pancytopenia (H)     Hypothyroidism     Hx of psychiatric care     Encephalopathy, hepatic (H)     GI bleed     Encephalopathy     Elevated INR     Hepatic encephalopathy  (H)       Past Surgical History:   Procedure Laterality Date     ABDOMINAL PARACENTESIS  11/27/2018          COLONOSCOPY N/A 11/27/2015    Procedure: COMBINED COLONOSCOPY, SINGLE OR MULTIPLE BIOPSY/POLYPECTOMY BY BIOPSY;  Surgeon: Ran Thurston MD;  Location: [facility]     ENDOSCOPIC ULTRASOUND LOWER GASTROINTESTIONAL TRACT (GI) N/A 10/15/2018    Procedure: Rectal Endoscopic Ultrasound **Latex Allergy** with coiling and glueing of rectal varices;  Surgeon: Guru Jose Kelly MD;  Location: [facility]     ENDOSCOPIC ULTRASOUND UPPER GASTROINTESTINAL TRACT (GI) N/A 10/1/2018    Procedure: ENDOSCOPIC ULTRASOUND, ESOPHAGOSCOPY / UPPER GASTROINTESTINAL TRACT (GI);;  Surgeon: Guru Jose Kelly MD;  [facility]: [facility]     ESOPHAGOSCOPY, GASTROSCOPY, DUODENOSCOPY (EGD), COMBINED N/A 11/23/2015    Procedure: COMBINED ESOPHAGOSCOPY, GASTROSCOPY, DUODENOSCOPY (EGD);  Surgeon: Rocael Rizvi MD;  [facility]     ESOPHAGOSCOPY, GASTROSCOPY, DUODENOSCOPY (EGD), COMBINED N/A 1/25/2017    Procedure: COMBINED ESOPHAGOSCOPY, GASTROSCOPY, DUODENOSCOPY (EGD), BIOPSY SINGLE OR MULTIPLE;  Surgeon: Rocael Tejada MD;  Location: [facility]     ESOPHAGOSCOPY, GASTROSCOPY, DUODENOSCOPY (EGD), COMBINED N/A 3/30/2017    Procedure: COMBINED ESOPHAGOSCOPY, GASTROSCOPY, DUODENOSCOPY (EGD);  Surgeon: Jordana Ramirez MD;  [facility]: [facility]     ESOPHAGOSCOPY, GASTROSCOPY, DUODENOSCOPY (EGD), COMBINED N/A 1/19/2018    Procedure: COMBINED ESOPHAGOSCOPY, GASTROSCOPY, DUODENOSCOPY (EGD);  Upper Endoscopy with verceal banding; Flexible Sigmoidoscopy;  Surgeon: Guru Jose Kelly MD;  [facility]     ESOPHAGOSCOPY, GASTROSCOPY, DUODENOSCOPY (EGD), COMBINED N/A 2/12/2018    Procedure: COMBINED ESOPHAGOSCOPY, GASTROSCOPY, DUODENOSCOPY (EGD);  Esophagogastroduodenoscopy with variceal banding;  Surgeon: Guru Jose Kelly MD;  [facility]     ESOPHAGOSCOPY,  GASTROSCOPY, DUODENOSCOPY (EGD), COMBINED N/A 3/5/2018    Procedure: COMBINED ESOPHAGOSCOPY, GASTROSCOPY, DUODENOSCOPY (EGD);  Esophagogastroduodenoscopy  with banding of varices Latex Allergy ;  Surgeon: Guru Jose Kelly MD;  [facility]     ESOPHAGOSCOPY, GASTROSCOPY, DUODENOSCOPY (EGD), COMBINED N/A 4/16/2018    Procedure: COMBINED ESOPHAGOSCOPY, GASTROSCOPY, DUODENOSCOPY (EGD);  Upper Endoscopy  with esophageal varices banding; Latex Allergy ;  Surgeon: Guru Jose Kelly MD;  [facility]     ESOPHAGOSCOPY, GASTROSCOPY, DUODENOSCOPY (EGD), COMBINED N/A 5/30/2018    Procedure: COMBINED ESOPHAGOSCOPY, GASTROSCOPY, DUODENOSCOPY (EGD);  upper endoscopy with banding of esophageal varices. *latex Allergy*;  Surgeon: Guru Jose Kelly MD;  Location:  OR     OPTICAL TRACKING SYSTEM CRANIOTOMY, EXCISE TUMOR, COMBINED  6/6/2013    Procedure: COMBINED OPTICAL TRACKING SYSTEM CRANIOTOMY, EXCISE TUMOR;  Left Stealth Guided Craniotomy , Tumor Resection ;  Surgeon: J Carlos Aranda MD;  Location:  OR     ORTHOPEDIC SURGERY      hand surgery- right     SIGMOIDOSCOPY FLEXIBLE N/A 11/24/2015    Procedure: SIGMOIDOSCOPY FLEXIBLE;  Surgeon: Rocael Rizvi MD;  Location:  GI     SIGMOIDOSCOPY FLEXIBLE N/A 1/19/2018    Procedure: SIGMOIDOSCOPY FLEXIBLE;;  Surgeon: Guru Jose Kelly MD;  Location:  OR     SIGMOIDOSCOPY FLEXIBLE N/A 10/1/2018    Procedure: SIGMOIDOSCOPY FLEXIBLE;;  Surgeon: Guru Jose Kelly MD;  Location:  OR        ALLERGY                                Latex; No clinical screening - see comments; and Tegaderm transparent dressing (informational only)  MEDICATIONS                               Current Outpatient Medications   Medication Sig Dispense Refill     acetaminophen (TYLENOL) 325 MG tablet Take 2 tablets (650 mg) by mouth every 8 hours as needed for mild pain       blood glucose monitoring  (ONETOUCH ULTRA) test strip Use to test blood sugars 4 times daily as needed or as directed. 400 each 1     Calcium Carb-Cholecalciferol (CALCIUM 500 +D) 500-400 MG-UNIT TABS Take 500 mg by mouth daily 90 tablet 1     CANE, ANY MATERIAL One cane 1 each 0     carBAMazepine (TEGRETOL) 100 MG chewable tablet Take 150 mg bid  7 days (until 12/18)  Then take 150 mg in am, 100 mg in pm 7 days(starting 12/19 until 12/25)  Then take 100 mg bid bid 7 days (starting 12/26 until 1/1)  Then take 100 mg in am, 50 mg in pm 7 days (starting 1/2 until 1/8)  Then take 50 mg bid 7 days (starting 1/9 until 1/15)  Then take 50 mg once daily (starting 1/16 until 1/22)  Then stop  0     ciclopirox (LOPROX) 0.77 % cream Apply topically 2 times daily To feet and toenails. (Patient taking differently: Apply topically 2 times daily as needed To feet and toenails.) 90 g 4     cyanocobalamin 1000 MCG TABS Take 1 tablet by mouth daily       ferrous sulfate (IRON) 325 (65 FE) MG tablet Take 1 tablet (325 mg) by mouth 2 times daily 200 tablet 3     furosemide (LASIX) 20 MG tablet Take 1 tablet (20 mg) by mouth daily 30 tablet 3     gabapentin (NEURONTIN) 100 MG capsule Take 1 capsule (100 mg) by mouth At Bedtime 30 capsule 3     hydrOXYzine (ATARAX) 25 MG tablet Take 1 tablet (25 mg) by mouth 2 times daily as needed for itching 60 tablet 0     lactulose (CHRONULAC) 10 GM/15ML solution Take 60 mLs by mouth daily as needed for constipation (If less than 4 bowel movements)  0     lactulose (CHRONULAC) 10 GM/15ML solution Take 60 mLs by mouth 2 times daily 3600 mL 3     levETIRAcetam (KEPPRA) 750 MG tablet Take 1 tablet (750 mg) by mouth 2 times daily 60 tablet 3     levothyroxine (SYNTHROID/LEVOTHROID) 88 MCG tablet Take 1 tablet (88 mcg) by mouth daily 90 tablet 1     lidocaine (LMX4) 4 % CREA cream Apply topically once as needed for mild pain 133 g 1     methylphenidate (RITALIN) 10 MG tablet Take 1 tablet (10 mg) by mouth 2 times daily (take at  "8AM and 2pm) 14 tablet 0     miconazole (MICATIN/MICRO GUARD) 2 % external powder Apply topically 2 times daily To groin as needed 43 g      mirtazapine (REMERON) 45 MG tablet Take 1 tablet (45 mg) by mouth At Bedtime 30 tablet 2     multivitamin, therapeutic with minerals (THERA-VIT-M) TABS Take 1 tablet by mouth 2 times daily       omeprazole (PRILOSEC) 20 MG CR capsule Take 2 capsules (40 mg) by mouth 2 times daily 360 capsule 3     ONE TOUCH LANCETS MISC by In Vitro route 4 times daily as needed 100 each prn     order for DME Equipment being ordered: manual wheelchair and cushion    [employer] light weight Sx5 18x18, 17.5 inch floor height and a 2 inch 18x18 inch cushion    length of need : lifetime        Patient's height : 5/10\", weight : 172 pounds 1 Device 0     pravastatin (PRAVACHOL) 80 MG tablet Take 1 tablet (80 mg) by mouth daily 90 tablet 3     spironolactone (ALDACTONE) 25 MG tablet Take 1 tablet (25 mg) by mouth daily 60 tablet 0     thiamine (VITAMIN B-1) 100 MG tablet Take 1 tablet (100 mg) by mouth daily 100 tablet 1     traZODone (DESYREL) 50 MG tablet Take 1 tablet (50 mg) by mouth nightly as needed for sleep 30 tablet 2     Vitamin D, Cholecalciferol, 1000 units TABS Take 1 tablet by mouth daily       XIFAXAN 550 MG TABS tablet TAKE ONE TABLET BY MOUTH TWICE DAILY 60 tablet 11     VITALS                                                                                                                                  3, 3   /79   Pulse 71       MENTAL STATUS EXAM                                                                                      9, 14 cog gs   Alertness: alert  and oriented  Appearance: adequately groomed, wearing a 's hat  Behavior/Demeanor: cooperative and pleasant, with fair  eye contact   Speech: slowed and dysarthric  Language: word finding difficulty  Psychomotor: choreoathetoid movements of the hand  Mood: okay  Affect: restricted; was congruent to mood; was " congruent to content  Thought Process/Associations: difficulties expressing thoughts  Thought Content:  Reports none;  Denies suicidal ideation, violent ideation and delusions  Perception:  Reports none;  Denies auditory hallucinations and visual hallucinations  Insight: fair  Judgment: fair  Cognition: (6) poor attention and memory, required MIL to provide majority of history  Gait and Station: remained in wheelchair, but reportedly walking with aid of PT    LABS and DATA   RATING SCALES:    N/A    PHQ9 TODAY = 10  PHQ-9 SCORE 10/19/2017 1/23/2018 10/2/2018   PHQ-9 Total Score - - -   PHQ-9 Total Score 13 11 10       DIAGNOSIS     Attention deficit hyperactivity disorder (ADHD) by history  Major depressive disorder with single episode, in partial remission (H)  Obstructive sleep apnea  Neurocognitive disorder     ASSESSMENT                                                                                                          m2, h3   45 yo M with h/o oligoastrocytoma s/p resection + xrt 2013, anoxic brain injury? 2016, EtOH cirrhosis, hypothyroidism, pancytopenia, cognitive impairment, seizure disorder, muliple childhood AEs, major depression. TODAY, patient presents with his wife with complaints of heightened irritability and increased mood lability following 2 medical hospitalizations. No recent SI/HI. These symptoms coincide the initiation of a cross-titration from Tegretol to Keppra, which is known to cause behavioral disturbances. Discussed that we ideally did not want to add another medication to treat this agitation, especially as there is already a significant amount of polypharmacy. Instead, will increase Gabapentin as it is not metabolized hepatically to target the agitation. Will also reach out to Neurology about this issue to see if there is any room to decrease the dose of Keppra or to consider another anticonvulsant, though options are likely quite limited given patient's hepatic impairment. Would  also consider lowering Ritalin as this can contribute to agitation, though patient's wife feels quite strongly that this medication has been very important in improving Moon's alertness and cognition.     [place] PRESCRIPTION MONITORING PROGRAM [] was checked today:  indicates appropriate filling of Ritalin.    PSYCHOTROPIC DRUG INTERACTIONS:   -Trazodone + Remeron + Hydroxyzine increases risk for sedation  -Tegretol can decrease level of Ritalin  -Trazodone can increase Tegretol level    MANAGEMENT:  Monitoring for adverse effects (and Tegretol is being tapered by neurology)     PLAN                                                                                                                       m2, h3     1) PSYCHOTROPIC MEDICATIONS:  Changed Medications--  - Increase Gabapentin to 100mg TID. If irritability not improved after a week, can increase to 200mg TID. After another week, if still not improved, increase to 300mg TID    Continue Medications--  - Remeron 45mg QHS   - Trazodone 50mg QHS  - Ritalin 10mg BID    2) THERAPY:    will be restarting    3) NEXT DUE:    Labs- N/A  EKG- N/A  Rating Scales- N/A    4) REFERRALS:    No Referrals needed     5) RTC: 1 month, sooner if needed    6) CRISIS NUMBERS:   Provided routinely in AVS.   [facility] [phone] (clinic)    [phone] (after hours)  ONLY if a [facility] PT: [facility] [phone] (clinic), [phone] (after hours)     TREATMENT RISK STATEMENT:  The risks, benefits, alternatives and potential adverse effects have been discussed and are understood by the pt. The pt understands the risks of using street drugs or alcohol. There are no medical contraindications, the pt agrees to treatment with the ability to do so. The pt knows to call the clinic for any problems or to access emergency care if needed.  Medical and substance use concerns are documented above.  Psychotropic drug interaction check was done, including changes made  today.    PROVIDER: Leroy Alcaraz MD    Patient staffed in clinic with Dr. Danielson who will sign the note.  Supervisor is Dr. Danielson.  I saw the patient with the resident, and participated in key portions of the service, including the mental status examination and developing the plan of care. I reviewed key portions of the history with the resident. I agree with the findings and plan as documented in this note.    Yessi Danielson MD

## 2019-01-07 NOTE — LETTER
2019       RE: Mono Varghese  [address] [facility] [address]     Dear Colleague,    Thank you for referring your patient, Mono Varghese, to the [facility] NEUROLOGY at [facility]. Please see a copy of my visit note below.    Service Date: 2019      [place] MD   Primary Care Center   [address] [place] [address], [address]   [place] [address]      RE: Mono Varghese   MRN: [id number]   : 1970      Dear Pietro:      Mr. 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  I follow him for the seizures.  He has actually not had a seizure for many years.  He did have a meningioma operated on in the past, but there have been no signs of recurrence of that.  He recently was seen by Psychiatry because of major depression and other concerns about his anticonvulsants, so they sent him to us for a review.  He is currently tapering off the carbamazepine, probably because of some sodium issues, and he has been on a stable dose of levetiracetam without any major changes in behavior or anything.  He is taking 750 twice a day and we do not have a concentration for that.  He has not had any seizure for many years.  We did both levels yesterday and the carbamazepine result is 4.8 but it has come down from 9 or 10 before.  The Keppra level is pending.      PHYSICAL EXAMINATION:  On exam, he looks poor.  He is pale.  He has lost more weight.  He has tremendous abdominal distention.  His eye movements are not really a nystagmus.  He does have asterixis of the arms and hand.      In summary, he is in very poor shape as far as his liver failure.  He is on lactulose, his ammonia has come down.  He is kind of in hospice care.  As far as his seizures, he has had none.  We will keep him comfortable and he will come off the Tegretol fairly good and we will keep him  on levetiracetam depending on the level, the dose, which should be back shortly.      Thank you for referring him to Neurology for help with management, seizure history and brain tumor.      Sincerely,     Anil English MD       D: 2019   T: 2019   MT: al      Name:     SALO MADERA   MRN:      [id number]        Account:      [id number]   :      1970           Service Date: 2019      Document: [id number]

## 2019-01-07 NOTE — PROGRESS NOTES
[facility] now requests orders and shares plan of care/discharge summaries for some patients through for; to (do).  Please REPLY TO THIS MESSAGE OR ROUTE BACK TO THE AUTHOR in order to give authorization for orders when needed.  This is considered a verbal order, you will still receive a faxed copy of orders for signature.  Thank you for your assistance in improving collaboration for our patients.      PLEASE, clarify medications below.    1.Family is reporting  Cymbalta should be discontinued, is  this correct?    2 Ritalin, per d/c paper work dosing should be 5 mg BID, family reports it should be 10 mg BID, which dose would you like?    3. Remeron states 30 mg at HS, family is reporting 45 mg at HS, which dose would do you recommend?    Thank you!  Sara Escalante RN  [email or web address]  [phone]

## 2019-01-07 NOTE — PROGRESS NOTES
[facility] now requests orders and shares plan of care/discharge summaries for some patients through [employer].  Please REPLY TO THIS MESSAGE OR ROUTE BACK TO THE AUTHOR in order to give authorization for orders when needed.  This is considered a verbal order, you will still receive a faxed copy of orders for signature.  Thank you for your assistance in improving collaboration for our patients.    ORDER-   PLEASE read/respone to 1 through 4    1. SN 1w2, 2 PRN  2. HHA 2w1, 1w1  3. PT/OT/ST eval and Tx.    4. Medication clarifications:    Pts family reports Pt taking Hydroxyzine 25 mg PO TID and Pravastatin 80 mg daily. Folic acid 1 tab PO daily.  Pt was previously taking these meds prior to hospital, but now are not on d/c paper work. Did note in TCU MD summary that prvastatin was d/c, but unware of hydroxyzine or Folic acid.  Should Pt continue taking these meds as stated or discontinue?        MD SUMMARY/PLAN OF CARE    SUMMARY TO MD  Pt returns home post hospital and TCU stay d/t hepatic encephalopathy.  He resumes home care services for SN, PT/OT, ST and HHA.  VSS, LS CTA in upper lobes, dim in bases. Denies cough, but noted SOB with mod activity such as walking 15 ft.  A/o to self and place, disorientated x3.  Pt has 24/7 supervision care d/t cognitive impairment. Care is provided by his wife or mother in law, who is a retired NP. Hx of poor appetite, but while he was in TCU he was eating 3 meals per day, will need to assess wt and edu. on nutrition.  DM II, but does not follow an ADA diet, BS was 275 post brfst. Pt tells nurse he checks his BS daily, but when nurse checked his BS machine, last ready was from Nov. 2018, therefore has not checked since being home on Saturday. Will continue to assess and edu. r/t BS and proper nutrition r/t DM.  Inc of B and B, wears incontinent products and changes PRN by caregivers.  Ambulates with walker and assistive one, all other ADLS are at least SBA if not  total assist of one. Primary locomotion is use of wheelchair. uLis ambriz, at risk for break down, will need further edu. and skin assessment. LE/Feet were assessed d/t dm, CDI, no areas of break down. Edu. on importance of needing to check daily. Non/pitting edema to LE, this is reported to be per pts baseline. Pt is high risk for falls, and reports /many falls/ over the last few months, one with Pt hitting head, but no injuries.  Fall safety and Edu. will be provided throughout services. PT/OT eval and tx have been requested.    Pt denies any issues with depression/anxiety, but Mother in law reports yelling, hitting and other behaviors while in TCU. Tells nurse med changes have made a difference, and mood has been pretty stable.  Will continue to assess/edu on coping skills and update MD PRN.  Pt sees psychiatrist/Leroy Lobato Q 3 months, no longer completes weekly consoling. Need to clarify Cymbalta, Ritalin and Remeron as Pt is taking them differently than D/c paper work. In addition will be talking to PMD about Hydroxyzine, Pravastatin as Pt is taking them when not on d/c paper work, all other meds are being taken per d/c paper work.  Mother in law had set up meds x 1 wk, home care team will set up meds next time they are due to be set up.   BACKGROUND ANALYSIS  48 yr old male at [facility] x 16 days r/t hepatic encephalopathy, than sent to TCU for therapy. Throughout the course of stay many medication changes.   RECOMMENDATION SN services for Med mgmt.,  Chronic disease assessment/edu and physical/mental health assessment/edu. , OT eval and treat to include safety with ADLs, IADLs, energy conservation, caregiver education,  PT eval and treat to include strengthening, HEP, comprehensive falls risk assessment, SLP eval for cognitive linguistic deficits and Medication Management assessment, and HHA assistance to include bathing and personal cares.      Thank you!  Sara Escalante  ELANA [email or web address]  [phone]

## 2019-01-08 NOTE — TELEPHONE ENCOUNTER
Medication has been ordered by PCP. It was removed from med list and re-entered as historical in September 2018 to specify that patient takes medication every morning rather than just daily. Medication has not been discontinued, however. Reordered per protocol.    Vanessa Morales RN

## 2019-01-08 NOTE — TELEPHONE ENCOUNTER
[facility] Call Center    Phone Message    May a detailed message be left on voicemail: yes    Reason for Call: Other: Tashia, home care nurse, put a message in yesterday regarding skilled nursing orders and medication questions. Please give her a call back.      Action Taken: Message routed to:  [facility] PCC

## 2019-01-08 NOTE — PROGRESS NOTES
Service Date: 2019      King Louis MD   Primary Care Center   [address]   [place] [address]      RE: Mono Varghese   MRN: [id number]   : 1970      Dear Pietro:      Mr. 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  I follow him for the seizures.  He has actually not had a seizure for many years.  He did have a meningioma operated on in the past, but there have been no signs of recurrence of that.  He recently was seen by Psychiatry because of major depression and other concerns about his anticonvulsants, so they sent him to us for a review.  He is currently tapering off the carbamazepine, probably because of some sodium issues, and he has been on a stable dose of levetiracetam without any major changes in behavior or anything.  He is taking 750 twice a day and we do not have a concentration for that.  He has not had any seizure for many years.  We did both levels yesterday and the carbamazepine result is 4.8 but it has come down from 9 or 10 before.  The Keppra level is pending.      PHYSICAL EXAMINATION:  On exam, he looks poor.  He is pale.  He has lost more weight.  He has tremendous abdominal distention.  His eye movements are not really a nystagmus.  He does have asterixis of the arms and hand.      In summary, he is in very poor shape as far as his liver failure.  He is on lactulose, his ammonia has come down.  He is kind of in hospice care.  As far as his seizures, he has had none.  We will keep him comfortable and he will come off the Tegretol fairly good and we will keep him on levetiracetam depending on the level, the dose, which should be back shortly.      Thank you for referring him to Neurology for help with management, seizure history and brain tumor.      Sincerely,         MD SARA Romeo MD             D: 2019   T:  2019   MT: al      Name:     SALO MADERA   MRN:      [id number]        Account:      [id number]   :      1970           Service Date: 2019      Document: [id number]

## 2019-01-10 NOTE — TELEPHONE ENCOUNTER
Ok to discontinue the pravastatin. Hydroxyzine can leave as 25 mg tid prn itch (not scheduled) or anxiety, either one. Continue folic acid 1 mg/day.

## 2019-01-10 NOTE — TELEPHONE ENCOUNTER
Home care nurse is wondering about medications, particularly if patient should continue the Pravastatin 80 mg daily. Please see note below from home care nurse for clarification of medication orders:    Pts family reports Pt taking Hydroxyzine 25 mg PO TID and Pravastatin 80 mg daily. Folic acid 1 tab PO daily.  Pt was previously taking these meds prior to hospital, but now are not on d/c paper work. Did note in TCU MD summary that prvastatin was d/c, but unware of hydroxyzine or Folic acid.  Should Pt continue taking these meds as stated or discontinue?    Tiffanie Lentz LPN 1/10/2019 7:41 AM

## 2019-01-10 NOTE — TELEPHONE ENCOUNTER
Spoke to Tashia home care nurse to relay providers message regarding medications, states verbal understanding. Tiffanie Lentz LPN 1/10/2019 10:02 AM

## 2019-01-10 NOTE — TELEPHONE ENCOUNTER
Nurse received In-Basket message as follows:     Call his mother in law   Received: Today   Message Contents   Anil English MD sent to Blair Dolan, RN             Increase LVTM to 750 mg tid. Level low.aurelio      Nurse placed call to patient's Mother-In_Law - Faby;  Gave her message as received and was asked by her about a retest, which nurse indicated could be ordered.  Mono will be going to [facility] for procedure and it can be done then.    Order entered for lab draw for Levetiracetam level.

## 2019-01-11 NOTE — TELEPHONE ENCOUNTER
[facility] Call Center    Phone Message    May a detailed message be left on voicemail: yes    Reason for Call: Order(s): Home Care Orders: Occupational Therapy (OT): 1x a week for 1 week - 6 visits in 6 weeks    Action Taken: Message routed to:  [facility] P[facility]      Verbal order given and documented. Tiffanie Lentz LPN 1/11/2019 4:49 PM

## 2019-01-11 NOTE — TELEPHONE ENCOUNTER
Verbal orders given to Angelique from [facility], per Dr. Louis, for Home Care Orders: Occupational Therapy (OT): 1x a week for 1 week - 6 visits in 6 weeks. Tiffanie Lnetz LPN 1/11/2019 4:49 PM

## 2019-01-11 NOTE — PROGRESS NOTES
[facility] utilizes an encounter to take the place of a direct phone call to your office. Please take a moment to review the below request. Please reply or route message to author of this encounter.  Message will act as a verbal OK of orders requested below. Thank you.    ORDER  Pt seen for home PT, planning to continue 1w1 beginning wk of 1/13/19 for gait/transfer training/hep instruction.  Ruben Whittington PT  [phone]

## 2019-01-15 NOTE — PROGRESS NOTES
[facility] utilizes an encounter to take the place of a direct phone call to your office. Please take a moment to review the below request. Please reply or route message to author of this encounter.  Message will act as a verbal OK of orders requested below. Thank you.    ORDER    SN 1 x a week for 1 week then 2 x a week for 3 weeks, then 1 x a week for 4 weeks with 2 prn    HA 1 x a week for one week then 2 x a week for 8 weeks.     Ok to apply barrier product to buttocks for skin breakdown due to frequent stooling.      MD SUMMARY on 1/15/19  Client is post hospital and TCU stay d/t hepatic encephalopathy.  He resumes home care services for SN, PT/OT, ST and HHA.  VSS, LS CTA in upper lobes, dim in bases. Denies cough, but noted SOB with mod activity such as walking 15 ft.  A/o to self and place, disorientated x3.  Pt has 24/7 supervision care d/t cognitive impairment. Care is provided by his wife or mother in law, who is a retired NP. Hx of poor appetite, but while he was in TCU he was eating 3 meals per day, will need to assess wt and edu. on nutrition.  DM II, but does not follow an ADA diet, BS was 311 post brfst. Client continues to drinksome sweatened beverages and has approx 6 tootsie pop suckers per day. Client continues to forget to check BG and caregivers will start to provider reminders to check this prior to breakfast. Inc of B and B, wears incontinent products and changes PRN by caregivers. Client is having up to 10 loose stools per day.  CG reports that he is more often having accidents, but occasionally makes it to the toilet. He is using bedside commode for stooling as well. Ambulates with walker and assistive one, all other ADLS are at least SBA if not total assist of one. This is an improvement from last CERT where he was mostly WC bound. Therapy at the TCU made a difference and edu has been provided on keeping up with this mementum. Primary locomotion is use of wheelchair. Luis  completed, at risk for break down, will need further edu. and skin assessment. LE/Feet were assessed d/t dm, CDI, no areas of break down. Edu. on importance of needing to check daily. Non/pitting edema to LE, this is reported to be per pts baseline. Client does have skin breakdown in the rectal area due to frequent stooling/wiping.  Caregivers educated on use of barrier product to protect skin.  Pt is high risk for falls, and reports /many falls/ over the last few months, one with Pt hitting head, but no injuries.   No fall since home from the [facility]. edFall and safety edu will be provided throughout services. PT/OT eval and tx have been requested.    Pt denies any issues with depression/anxiety, but Mother in law reports yelling, hitting and other behaviors while in TCU. Since home, client has been in better spirits and appears to be less confused.  He has been compliant with lactulose dosing.   Will continue to assess/edu on coping skills and update MD PRN.  Pt sees psychiatrist/Leroy Lobato Q 3 months, no longer completes weekly counsoling. Client remains on Ritalin and Remeron from psych provider. Duloxetine has been DCd. Client has also been DCd from his statin.  He continues to take hydroxyzine TID for itching. This is ordered as PRN, but is being set up per request of caregivers. Home care team to continue with med set ups.   BACKGROUND ANALYSIS  48 yr old male at [facility] x 16 days r/t hepatic encephalopathy, than sent to TCU for therapy. Throughout the course of stay many medication changes.   RECOMMENDATION SN services for Med mgmt.,  Chronic disease assessment/edu and physical/mental health assessment/edu. , OT eval and treat to include safety with ADLs, IADLs, energy conservation, caregiver education,  PT eval and treat to include strengthening, HEP, comprehensive falls risk assessment, SLP eval for cognitive linguistic deficits and Medication Management assessment, and HHA assistance to include  bathing and personal cares.    Angela Manley, ELANA Case Manager  [phone]  [email or web address]

## 2019-01-18 NOTE — TELEPHONE ENCOUNTER
Last seen: 1/2/19  RTC: 1 month  Cancel: none  No-show: none  Next appt: 2/6/19     Incoming refill from [facility] message from wife.     Medication requested: methylphenidate (RITALIN) 10 MG tablet  Directions: Take 1 tablet (10 mg) by mouth 2 times daily (take at 8AM and 2pm) - Oral  Qty: 14 tablet  Last refilled: Per [place] :  1/2/19 (10mg)  #14  12/30/18 (5mg) #15- refilled by rehab facility provider  12/8/18 (5mg) #15- refilled by rehab facility provider  10/23/18 (10mg) #30    Per last visit note:  Continue Medications--  - Remeron 45mg QHS   - Trazodone 50mg QHS  - Ritalin 10mg BID     Writer called pt's wife at [phone] and requested call back to clarify dosage as only 1 week supply was provided on 1/2/19 and pt would have lapsed in med administration. Waiting on call back to solomon.

## 2019-01-18 NOTE — PROGRESS NOTES
[facility] utilizes an encounter to take the place of a direct phone call to your office. Please take a moment to review the below request. Please reply or route message to author of this encounter.  Message will act as a verbal OK of orders requested below. Thank you.    JESENIA    Fall occurred two days ago in the middle of the night. Had an accident and fell when attempting to get to the commode.  Client reports that he has a cut on his left side and some bruising.  Writer assessed and there are no signs of infection. Client reports that there has not been very much pain since the falls. Denies hitting head. Edu on caution and taking time with transfers.  Client does have bowel urgency due to lactulose use and is wearing breifs. Edu that it is better to have the accident than risk the fall. Spouse was able to assist patient to the bed.  PCP notified of fall and appropriate follow up made. PT has been involved and has DCd. Client continues to work with OT. Also has HA and SN services.      Angela Manley RN Case Manager  [phone]  [email or web address]

## 2019-01-18 NOTE — TELEPHONE ENCOUNTER
Writer received signed script from attending provider, . Writer mailed script to pt's home address per request.

## 2019-01-18 NOTE — TELEPHONE ENCOUNTER
Writer received call from pt's wife. Wife stated that the pt had been in the hospital for approximately 3 weeks and then in a rehab facility for 3 weeks. As a result, they had some Ritalin at home and now only have 7 days left. Wife confirmed that the pt is taking 10mg BID as reflected in last visit note.    Wife requested to have script mailed to pt's home once ready.     Writer printed script for provider to sign, quantity of 18 days provided to last through follow-up appt on 2/6/19. Once signed, writer to mail script.

## 2019-01-22 NOTE — TELEPHONE ENCOUNTER
Verbal orders given to Shavonne from [facility], per Dr. Louis, for extension of speech therapy evaluation for home care; the current orders just . Tiffanie Lentz LPN 2019 3:42 PM

## 2019-01-22 NOTE — TELEPHONE ENCOUNTER
Health Call Center    Phone Message    May a detailed message be left on voicemail: yes    Reason for Call: Order(s): Home Care Orders: Other: Shavonne with [facility] home [facility] requests an extension for pt to have a speech therapy evaluation for home care; the current orders just . Please advise when available.    Action Taken: Message routed to:  [facility]      Verbal order given and documented. Tiffanie Lentz LPN 2019 3:41 PM

## 2019-01-29 NOTE — TELEPHONE ENCOUNTER
Health Call Center    Phone Message    May a detailed message be left on voicemail: yes    Reason for Call: Medication Question or concern regarding medication   Prescription Clarification  Name of Medication: levETIRAcetam (KEPPRA) 750 MG tablet  Prescribing Provider: Dr English  Pharmacy: [facility] [employer], [address] [place] [address], Ph # [phone]  What on the order needs clarification? Pt Rx was written as: Sig: TAKE 1 TABLET BY MOUTH TWICE A DAY, However Pt was told verbally by one of Dr English's Nurse's or MA's to increase to one table three times per day so Pt will be out Tomm and needs refills ASAP - but Pharmacy needs NEW CORRECTED RX with the right dosage of three times per day so they can fill the correct amount monthly so he won't keep running out - Please return call to [facility] Nurse, Ph # [phone] to ensure this was taken care of ASAP - Thanks!    Action Taken: Message routed to:  [facility] Neurology Clinic

## 2019-02-06 NOTE — PATIENT INSTRUCTIONS
- Increase Gabapentin up to 300mg three times daily  - Continue other medications without change  - Follow-up with a scheduled phone visit in 1 month  - Dr. Alcaraz will ask the clinic  to reach out to you to see if we can provide help in obtaining more assistance at home

## 2019-02-06 NOTE — PROGRESS NOTES
"PSYCHIATRY CLINIC PROGRESS NOTE     CARE TEAM:  PCP-[place]    Specialty Providers- yes, Dr. Beatriz Tanner (GI), Lauro Shaw (Oncology)   Therapist- will be starting   Community  Team- [facility]  (Jared via [facility])    Mono Varghese is a 48 year old male who prefers the name Rafy/Mono & pronouns he, him, his, himself.    The initial diagnostic evaluation was on 8/13/2014.  Date of the most recent transfer of care eval is 02/06/2019.    Pertinent Background:  This patient first experienced mental health issues in childhood and has received treatment for ADHD, depression.  See transfer evaluation for detailed history.  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 Found down by wife w/ variceal bleeding and was in coma in 2016, with question of anoxic brain injury. May 2018 neuropsych testing showing global impairment with major deficits in all tested areas.     Psych critical item history includes trauma hx and substance use treatment.    INTERIM HISTORY                                                                                              4, 4   The patient reports adequate treatment adherence (medications set up by home health nurse; wife helps administer).  History was provided by the patient who was a somewhat poor historian. Patient was also accompanied by his mother in law Faby, who provided most of the " history.  The last visit ended with the following med change: addition of gabapentin for irritability.      Since the last visit:  - has been living at home; family feeling overwhelmed by amount of care that Mono needs. Mono is having a lot of issues with incontinence--lactulose had been increased with last hospitalization to help prevent hepatic encephalopathy, but he is incontinent almost all the time now. Family is having to do 6 loads of laundry on daily basis. Became incontinent on drive to the clinic today. Also had to use the bathroom [facility] through the appointment.    - aid comes twice weekly for about 30 minutes to give Avelina bath. Nurse comes once weekly to set up pills. Yisel has been trying to get more assistance from the [facility] (i.e. More PCA hours) but things have been very slow     - increased Neurontin to 200mg three times daily. Has helped with irritability but insurance would only fill up to the 200mg dose due to how the prescription was written. Family is hoping for more flexibility. Tolerating the medication currently without issues.    RECENT SYMPTOMS:   DEPRESSION:  reports-depressed mood, anhedonia, low energy, poor concentration /memory and irritability;  DENIES- suicidal ideation and self-destructive thoughts  LEN/HYPOMANIA:  reports-none;  DENIES- increased energy, decreased sleep need, increased activity and grandiosity  PSYCHOSIS:  reports-none;  DENIES- delusions, auditory hallucinations and visual hallucinations  DYSREGULATION:  reports-none;  DENIES- suicidal ideation, violent ideation and SIB  PANIC ATTACK:  none   ANXIETY:  Excessive worries  TRAUMA RELATED:  denies  SLEEP:  Sleeping on average 6-7 hours daily; feels this is going the best it has in years    EATING DISORDER: no, but struggling with eating due to poor appetite    RECENT SUBSTANCE USE:     ALCOHOL- no      TOBACCO- no     CAFFEINE- N/A  OPIOIDS- no         NARCAN KIT- no        CANNABIS- no            OTHER  ILLICIT DRUGS- none      CURRENT SOCIAL HISTORY:  FINANCIAL SUPPORT- wife Yisel works at [employer] and also serve as patient's PCA; also gets SSDI  CHILDREN- one child  in  in infancy      LIVING SITUATION- Live in [place] with wife and dog      SOCIAL/ SPIRITUAL SUPPORT-  since  (Yisel). Mother-in-law Faby also visits frequently.      FEELS SAFE AT HOME- yes      MEDICAL ROS (2,10):  Reports  weakness, unsteadiness, diarrhea and incontinence--worse (from lactulose)     Denies headache and dizziness, oversedation    PSYCH and CD Critical Summary Points since 2018           - medical hospitalization x 4 (GI bleeding, E. 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 Also question of anoxic brain injury  (found passed out in pool of blood 2/2 variceal bleed). Seen by Neuropsych, most recently in May 2018 where he was found to have global impairment with major deficits in all assessed cognitive domains.     Patient Active Problem List   Diagnosis     Type 2 diabetes mellitus (H)     Moderate major depression (H)     LENA (obstructive sleep apnea)-moderate (AHI 16)     Oligoastrocytoma of parietal lobe (H)     ADHD (attention deficit hyperactivity disorder)     Financial difficulties     Thrombocytopenia (H)     Partial epilepsy with impairment of consciousness (H)     Cirrhosis of liver (H)     Pulmonary nodules     Myopic astigmatism of both eyes     Presbyopia     Ringing in ears, unspecified laterality     GIB (gastrointestinal bleeding)     Portal vein thrombosis     Chronic pain     Hyperlipidemia LDL goal <100     Pancytopenia (H)     Hypothyroidism     Hx of psychiatric care     Encephalopathy, hepatic (H)     GI  bleed     Encephalopathy     Elevated INR     Hepatic encephalopathy (H)       Past Surgical History:   Procedure Laterality Date     ABDOMINAL PARACENTESIS  11/27/2018          COLONOSCOPY N/A 11/27/2015    Procedure: COMBINED COLONOSCOPY, SINGLE OR MULTIPLE BIOPSY/POLYPECTOMY BY BIOPSY;  Surgeon: Ran Thurston MD;  Location: [facility]     ENDOSCOPIC ULTRASOUND LOWER GASTROINTESTIONAL TRACT (GI) N/A 10/15/2018    Procedure: Rectal Endoscopic Ultrasound **Latex Allergy** with coiling and glueing of rectal varices;  Surgeon: Guru Jose Kelly MD;  [facility]: [facility]     ENDOSCOPIC ULTRASOUND UPPER GASTROINTESTINAL TRACT (GI) N/A 10/1/2018    Procedure: ENDOSCOPIC ULTRASOUND, ESOPHAGOSCOPY / UPPER GASTROINTESTINAL TRACT (GI);;  Surgeon: Guru Jose Kelly MD;  [facility]: [facility]     ESOPHAGOSCOPY, GASTROSCOPY, DUODENOSCOPY (EGD), COMBINED N/A 11/23/2015    Procedure: COMBINED ESOPHAGOSCOPY, GASTROSCOPY, DUODENOSCOPY (EGD);  Surgeon: Rocael Rizvi MD;  [facility]     ESOPHAGOSCOPY, GASTROSCOPY, DUODENOSCOPY (EGD), COMBINED N/A 1/25/2017    Procedure: COMBINED ESOPHAGOSCOPY, GASTROSCOPY, DUODENOSCOPY (EGD), BIOPSY SINGLE OR MULTIPLE;  Surgeon: Rocael Tejada MD;  [facility]: [facility]     ESOPHAGOSCOPY, GASTROSCOPY, DUODENOSCOPY (EGD), COMBINED N/A 3/30/2017    Procedure: COMBINED ESOPHAGOSCOPY, GASTROSCOPY, DUODENOSCOPY (EGD);  Surgeon: Jordana Ramirez MD;  [facility]: [facility]     ESOPHAGOSCOPY, GASTROSCOPY, DUODENOSCOPY (EGD), COMBINED N/A 1/19/2018    Procedure: COMBINED ESOPHAGOSCOPY, GASTROSCOPY, DUODENOSCOPY (EGD);  Upper Endoscopy with verceal banding; Flexible Sigmoidoscopy;  Surgeon: Guru Jose Kelly MD;  [facility]     ESOPHAGOSCOPY, GASTROSCOPY, DUODENOSCOPY (EGD), COMBINED N/A 2/12/2018    Procedure: COMBINED ESOPHAGOSCOPY, GASTROSCOPY, DUODENOSCOPY (EGD);  Esophagogastroduodenoscopy with variceal banding;  Surgeon: Robin  Guru Jose Langley MD;  [facility]     ESOPHAGOSCOPY, GASTROSCOPY, DUODENOSCOPY (EGD), COMBINED N/A 3/5/2018    Procedure: COMBINED ESOPHAGOSCOPY, GASTROSCOPY, DUODENOSCOPY (EGD);  Esophagogastroduodenoscopy  with banding of varices Latex Allergy ;  Surgeon: Guru Jose Kelly MD;  [facility]     ESOPHAGOSCOPY, GASTROSCOPY, DUODENOSCOPY (EGD), COMBINED N/A 4/16/2018    Procedure: COMBINED ESOPHAGOSCOPY, GASTROSCOPY, DUODENOSCOPY (EGD);  Upper Endoscopy  with esophageal varices banding; Latex Allergy ;  Surgeon: Guru Jose Kelly MD;  [facility]     ESOPHAGOSCOPY, GASTROSCOPY, DUODENOSCOPY (EGD), COMBINED N/A 5/30/2018    Procedure: COMBINED ESOPHAGOSCOPY, GASTROSCOPY, DUODENOSCOPY (EGD);  upper endoscopy with banding of esophageal varices. *latex Allergy*;  Surgeon: Guru Jose Kelly MD;  Location:  OR     OPTICAL TRACKING SYSTEM CRANIOTOMY, EXCISE TUMOR, COMBINED  6/6/2013    Procedure: COMBINED OPTICAL TRACKING SYSTEM CRANIOTOMY, EXCISE TUMOR;  Left Stealth Guided Craniotomy , Tumor Resection ;  Surgeon: J Carlos Aranda MD;  Location:  OR     ORTHOPEDIC SURGERY      hand surgery- right     SIGMOIDOSCOPY FLEXIBLE N/A 11/24/2015    Procedure: SIGMOIDOSCOPY FLEXIBLE;  Surgeon: Rocael Rizvi MD;  Location:  GI     SIGMOIDOSCOPY FLEXIBLE N/A 1/19/2018    Procedure: SIGMOIDOSCOPY FLEXIBLE;;  Surgeon: Guru Jose Kelly MD;  Location:  OR     SIGMOIDOSCOPY FLEXIBLE N/A 10/1/2018    Procedure: SIGMOIDOSCOPY FLEXIBLE;;  Surgeon: Guru Jose Kelly MD;  [facility]        ALLERGY                                Silver nitrate; Latex; No clinical screening - see comments; and Tegaderm transparent dressing (informational only)  MEDICATIONS                               Current Outpatient Medications   Medication Sig Dispense Refill     blood glucose monitoring (ONETOUCH ULTRA) test strip Use to test  blood sugars 4 times daily as needed or as directed. 400 each 1     Calcium Carb-Cholecalciferol (CALCIUM 500 +D) 500-400 MG-UNIT TABS Take 500 mg by mouth daily 90 tablet 1     CANE, ANY MATERIAL One cane 1 each 0     carBAMazepine (TEGRETOL) 100 MG chewable tablet Take 150 mg bid  7 days (until 12/18)  Then take 150 mg in am, 100 mg in pm 7 days(starting 12/19 until 12/25)  Then take 100 mg bid bid 7 days (starting 12/26 until 1/1)  Then take 100 mg in am, 50 mg in pm 7 days (starting 1/2 until 1/8)  Then take 50 mg bid 7 days (starting 1/9 until 1/15)  Then take 50 mg once daily (starting 1/16 until 1/22)  Then stop  0     ciclopirox (LOPROX) 0.77 % cream Apply topically 2 times daily To feet and toenails. (Patient taking differently: Apply topically 2 times daily as needed To feet and toenails.) 90 g 4     ferrous sulfate (IRON) 325 (65 FE) MG tablet Take 1 tablet (325 mg) by mouth 2 times daily 200 tablet 3     folic acid (FOLVITE) 1 MG tablet Take 1 tablet (1 mg) by mouth daily 90 tablet 3     furosemide (LASIX) 20 MG tablet Take 1 tablet (20 mg) by mouth daily 30 tablet 3     gabapentin (NEURONTIN) 100 MG capsule Take 100mg three times daily for a week. If irritability is not improved, can increase to 200mg. If not improved after another week, can take up to 300mg three times daily 90 capsule 1     lactulose (CHRONULAC) 10 GM/15ML solution Take 60 mLs by mouth daily as needed for constipation (If less than 4 bowel movements) (Patient not taking: Reported on 1/7/2019)  0     lactulose (CHRONULAC) 10 GM/15ML solution Take 60 mLs by mouth 2 times daily (Patient taking differently: Take 60 mLs by mouth 3 times daily ) 3600 mL 3     levETIRAcetam (KEPPRA) 750 MG tablet Take 1 tablet (750 mg) by mouth 3 times daily 93 tablet 11     levothyroxine (SYNTHROID/LEVOTHROID) 88 MCG tablet Take 1 tablet (88 mcg) by mouth daily 90 tablet 1     lidocaine (LMX4) 4 % CREA cream Apply topically once as needed for mild pain 133  "g 1     methylphenidate (RITALIN) 10 MG tablet Take 1 tablet (10 mg) by mouth 2 times daily (take at 8AM and 2pm) 36 tablet 0     mirtazapine (REMERON) 45 MG tablet Take 1 tablet (45 mg) by mouth At Bedtime 30 tablet 2     multivitamin, therapeutic with minerals (THERA-VIT-M) TABS Take 1 tablet by mouth 2 times daily       omeprazole (PRILOSEC) 20 MG CR capsule Take 2 capsules (40 mg) by mouth 2 times daily 360 capsule 3     ONE TOUCH LANCETS MISC by In Vitro route 4 times daily as needed 100 each prn     order for DME Equipment being ordered: manual wheelchair and cushion    [employer] light weight Sx5 18x18, 17.5 inch floor height and a 2 inch 18x18 inch cushion    length of need : lifetime        Patient's height : 5/10\", weight : 172 pounds 1 Device 0     pravastatin (PRAVACHOL) 80 MG tablet Take 1 tablet (80 mg) by mouth daily 90 tablet 3     spironolactone (ALDACTONE) 25 MG tablet Take 2 tablets (50 mg) by mouth daily 180 tablet 0     thiamine (VITAMIN B-1) 100 MG tablet Take 1 tablet (100 mg) by mouth daily 100 tablet 1     traZODone (DESYREL) 50 MG tablet Take 1 tablet (50 mg) by mouth nightly as needed for sleep 30 tablet 2     vitamin B-12 (CYANOCOBALAMIN) 1000 MCG tablet 1 tablet (1,000 mcg) by Per G Tube route daily - Per G Tube 90 tablet 1     Vitamin D, Cholecalciferol, 1000 units TABS Take 1 tablet by mouth daily       XIFAXAN 550 MG TABS tablet TAKE ONE TABLET BY MOUTH TWICE DAILY 60 tablet 11     VITALS                                                                                                                                  3, 3   /76   Pulse 80       MENTAL STATUS EXAM                                                                                      9, 14 cog gs   Alertness: alert  and oriented  Appearance: adequately groomed, wearing a 's hat  Behavior/Demeanor: cooperative and pleasant, with fair  eye contact   Speech: slowed and dysarthric  Language: word finding " "difficulty  Psychomotor: choreoathetoid movements of the hand  Mood: \"okay\"  Affect: restricted; was congruent to mood; was congruent to content  Thought Process/Associations: difficulties expressing thoughts  Thought Content:  Reports none;  Denies suicidal ideation, violent ideation and delusions  Perception:  Reports none;  Denies auditory hallucinations and visual hallucinations  Insight: fair  Judgment: fair  Cognition: (6) poor attention and memory, required MIL to provide majority of history  Gait and Station: remained in wheelchair, but reportedly walking with aid of PT    LABS and DATA   RATING SCALES:    N/A    PHQ9 TODAY = 10  PHQ-9 SCORE 10/19/2017 1/23/2018 10/2/2018   PHQ-9 Total Score - - -   PHQ-9 Total Score 13 11 10       DIAGNOSIS     Attention deficit hyperactivity disorder (ADHD) by history  Major depressive disorder with single episode, in partial remission (H)  Obstructive sleep apnea  Neurocognitive disorder     ASSESSMENT                                                                                                          m2, h3   45 yo M with h/o oligoastrocytoma s/p resection + xrt 2013, anoxic brain injury? 2016, EtOH cirrhosis, hypothyroidism, pancytopenia, cognitive impairment, seizure disorder, muliple childhood AEs, major depression.    TODAY, patient presents with his mother in law Faby. Her primary concern today was regarding ongoing and worsening bowel incontinence that have put a lot of stress on all of Mono's caretakers and made it difficult to come in for his appointment, especially as it is a very long drive. We discussed doing some phone visits in the future to help ease this burden and encouraged Faby to continue discussing her concerns with Mono's PCP and neurologist. Will want to consider whether it makes sense to identify a closer psychiatric provider in the future. Also discussed that we would have our  contact family to see if they can help in " identifying more support at home. Notably, the gabapentin that was started at the last appointment has helped with Mono's heightened irritability, but they were only able to fill up to 200mg TID. Willre-write the prescription so that it can be filled to be taken up to 300mg TID.     [place] PRESCRIPTION MONITORING PROGRAM [] was checked today:  indicates appropriate filling of Ritalin.    PSYCHOTROPIC DRUG INTERACTIONS:   -Trazodone + Remeron + Hydroxyzine increases risk for sedation  -Tegretol can decrease level of Ritalin  -Trazodone can increase Tegretol level    MANAGEMENT:  Monitoring for adverse effects (and Tegretol is being tapered by neurology)     PLAN                                                                                                                       m2, h3     1) PSYCHOTROPIC MEDICATIONS:  Changed Medications--  - Take Gabapentin up to 300mg three times daily for irritability/anxiety    Continue Medications--  - Remeron 45mg QHS   - Trazodone 50mg QHS  - Ritalin 10mg BID (given 3 month supply)    2) THERAPY:    will be restarting    3) NEXT DUE:    Labs- N/A  EKG- N/A  Rating Scales- N/A    4) REFERRALS:    Requested social work to touch base with family to see if any additional assistance can be provided at home     5) RTC: 1 month by phone    6) CRISIS NUMBERS:   Provided routinely in AVS.   [facility] [phone] (clinic)    [phone] (after hours)  ONLY if a [facility] PT: [facility] [phone] (clinic), [phone] (after hours)     TREATMENT RISK STATEMENT:  The risks, benefits, alternatives and potential adverse effects have been discussed and are understood by the pt. The pt understands the risks of using street drugs or alcohol. There are no medical contraindications, the pt agrees to treatment with the ability to do so. The pt knows to call the clinic for any problems or to access emergency care if needed.  Medical and substance use concerns are documented  above.  Psychotropic drug interaction check was done, including changes made today.    PROVIDER: Leroy Alcaraz MD    Patient staffed in clinic with Dr. Danielson who will sign the note.  Supervisor is Dr. Danielson.  I saw the patient with the resident, and participated in key portions of the service, including the mental status examination and developing the plan of care. I reviewed key portions of the history with the resident. I agree with the findings and plan as documented in this note.    Yessi Danielson MD

## 2019-02-08 NOTE — PROGRESS NOTES
[facility] utilizes an encounter to take the place of a direct phone call to your office. Please take a moment to review the below request. Please reply or route message to author of this encounter.  Message will act as a verbal OK of orders requested below. Thank you.    Patient Update:    Client had a fall on Monday this past week. No injuries reported. Spouse and Mother in Law were able to get client back up.  Client reports that his right leg gave out and fall occurred.  Client has been having near constant right leg pain and rates pain up to a 7.  Writer has instructed client/caregiver to make OV with PMD.    Angela Manley RN Case Manager  [phone]  [email or web address]

## 2019-02-08 NOTE — PROGRESS NOTES
Please let them know an option is walk in Sports Med visit here for what sounds like acute leg injury

## 2019-02-08 NOTE — PROGRESS NOTES
[facility] utilizes an encounter to take the place of a direct phone call to your office. Please take a moment to review the below request. Please reply or route message to author of this encounter.  Message will act as a verbal OK of orders requested below. Thank you    Medication Update:    Client has been taking Keppra 750mg TID.  Client's Mother in Law reports that she received a call from your clinic instructing them to increase this to TID.  Client was also to follow up with labs and they will have this drawn in clinic on Tuesday next week to check level.  Current prescription bottle reads BID dosing and client will run out of this medication next week.  Can a new rx be sent to [facility] in [place] with the TID dosing.    Angela Manley RN Case Manager  [phone]  [email or web address]

## 2019-02-09 NOTE — TELEPHONE ENCOUNTER
-----------------------------------------------------------------------------------------------------------  Brief Psychiatry Phone note      Mono Varghese MRN# [id number]   Age: 48 year old   Clinic Provider: Dr. Alcaraz YOB: 1970   PCP: [place]            Phone note:   Writer was notified that Mono Varghese's wife called and requested to speak with a resident regarding his Ritalin. Writer called at this number :  [phone] at 3:23 PM.    She reported that patient is out of Ritalin with next refill scheduled for the 2/13. She is wondering if refill can be made earlier. Patient has been using as prescribed.          Assessment and Plan:   Mono Varghese is a 48 year old male with a history of astrocytoma s/p resection and radiation therapy, in addition to ADHD and depression who contacted us via his wife regarding refill of Ritalin. [place]  and [place] not have recent excessive prescribing. Seems that patient has been using as prescribed. No evidence of recent abuse. Based on the numbers, it makes sense patient would be out.     Called pharmacy ([facility], [phone]) and discussed with pharmacist who authorized earlier fill on prescriptions. Updated patient's wife.    =============================================================================  Ty Valdez DO, MA  PGY-2 Psychiatry Resident  Pager: [phone]

## 2019-02-12 PROBLEM — R06.02 SHORTNESS OF BREATH: Status: ACTIVE | Noted: 2019-01-01

## 2019-02-12 NOTE — LETTER
Health Information Management Services               Recipient:  Deneen Clark liaison        Sender:  FRANKLYN Felix, [facility]  [address]   Phone [phone]          Date: February 18, 2019  Patient Name:  Mono Varghese  Routing Message:    Please consider for TCU at [facility]. Ready anytime. Thanks!        The documents accompanying this notice contain confidential information belonging to the sender.  This information is intended only for the use of the individual or entity named above.  The authorized recipient of this information is prohibited from disclosing this information to any other party and is required to destroy the information after its stated need has been fulfilled, unless otherwise required by state law.      If you are not the intended recipient, you are hereby notified that any disclosure, copying, distribution or action taken in reliance on the contents of these documents is strictly prohibited. If you have received this document in error, please notify [facility] immediately at [phone].  You may return the document via fax ([phone]) or return mail  (Health Information Management, , [address] [place] [address]).

## 2019-02-12 NOTE — ED PROVIDER NOTES
History     Chief Complaint   Patient presents with     Shortness of Breath     patient has ascites r/t alcoholic liver cirrhosis per EMS ascites getting worst, now SOB with audible wheezing. Patient is scheduled for a paracenthesis donnie. at [facility].     HPI  Mono Varghese is a 48 year old male who presents to the emergency department with increasing shortness of breath and generalized weakness.  The patient has a history of alcohol related liver disease.  He has cirrhosis and ascites.  He typically gets paracentesis every other week but missed his last paracentesis secondary to a fall and a right leg injury.  For the past 2 days, the patient has complained of increasing dyspnea.  He denies any chest pain.  His abdomen has come increasingly distended.  He denies any abdominal pain.  He denies any nausea or vomiting.  He is on lactulose.  His wife reports that he has had black stools for the past 3 days.  He had 7 stools yesterday.  The patient also had some bright red blood per rectum earlier today.  His stools have been soft.  Additionally, the patient's wife states that he had a fever to 101.1 earlier today.  He has not taken any antipyretics.  He denies any sore throat.  No rhinorrhea.  No dysuria, urgency, or frequency.  He did have an influenza vaccination this year.  The patient's wife does state that he has been able to bear weight normally on his right leg since the fall.  He denies any pain in his right hip, thigh, knee, leg, ankle, and foot now.  The patient's wife endorses a cough for the past several days as well.    I have reviewed the Medications, Allergies, Past Medical and Surgical History, and Social History in the Epic system.    Review of Systems   Constitutional: Positive for fever.   HENT: Negative for congestion.    Eyes: Negative for redness.   Respiratory: Positive for cough (non-productive). Negative for shortness of breath.    Cardiovascular: Positive for leg swelling.  Negative for chest pain.   Gastrointestinal: Positive for abdominal distention. Negative for abdominal pain.   Genitourinary: Negative for difficulty urinating.   Musculoskeletal: Negative for arthralgias and neck stiffness.   Skin: Negative for color change.   Neurological: Negative for headaches.   Psychiatric/Behavioral: Negative for confusion.   All other systems reviewed and are negative.      Physical Exam   BP: (!) 121/111  Pulse: 81  Heart Rate: 80  Temp: 97.8  F (36.6  C)  Resp: 20  SpO2: 99 %      Physical Exam   Constitutional: He appears well-developed and well-nourished. No distress.   HENT:   Head: Normocephalic and atraumatic.   Mouth/Throat: Oropharynx is clear and moist.   Eyes: Pupils are equal, round, and reactive to light. No scleral icterus.   Neck: Normal range of motion.   Cardiovascular: Normal rate, regular rhythm, normal heart sounds and intact distal pulses.   Pulmonary/Chest: Effort normal and breath sounds normal. No respiratory distress.   Abdominal: Soft. Bowel sounds are normal. He exhibits distension. There is no tenderness.   Genitourinary: Rectal exam shows guaiac positive stool (green stool).   Musculoskeletal: Normal range of motion. He exhibits edema (1+ pretibial bilaterally). He exhibits no tenderness.        Right hip: Normal.        Right knee: Normal.        Right ankle: He exhibits normal range of motion. No tenderness.        Right lower leg: He exhibits swelling. He exhibits no tenderness.        Right foot: There is normal range of motion and no tenderness.   Neurological: He is alert. He has normal strength. Coordination normal.   Skin: Skin is warm. No rash noted. He is not diaphoretic.   Nursing note and vitals reviewed.      ED Course     ED Course as of Feb 12 1942   Tue Feb 12, 2019   1750 XR Chest Port 1 View     Procedures             EKG Interpretation:      Interpreted by SHRUTI ALLRED MD  Time reviewed: 1706  Symptoms at time of EKG: SOA   Rhythm: normal sinus    Rate: 77  Axis: Normal  Ectopy: none  Conduction: normal  ST Segments/ T Waves: Non-specific ST-T  wave changes  Q Waves: none  Comparison to prior: Flattened T waves and ST segments compared to 11/26/18    Clinical Impression: non-specific EKG    Results for orders placed or performed during the hospital encounter of 02/12/19   XR Chest Port 1 View    Narrative    XR CHEST PORT 1 VW  2/12/2019 5:09 PM     HISTORY:  soa    COMPARISON: Film dated 11/26/2018    FINDINGS:  Right Port-A-Cath is in place. There is a moderate-large  right pleural effusion which is new since 11/26/2018. There is  associated right basilar infiltrate/atelectasis. Left lung is clear.      Impression    IMPRESSION: Moderate-large right pleural effusion. This is new since  11/26/2018.    TESFAYE CAZARES MD   CBC with platelets differential   Result Value Ref Range    WBC 2.5 (L) 4.0 - 11.0 10e9/L    RBC Count 3.31 (L) 4.4 - 5.9 10e12/L    Hemoglobin 10.9 (L) 13.3 - 17.7 g/dL    Hematocrit 32.5 (L) 40.0 - 53.0 %    MCV 98 78 - 100 fl    MCH 32.9 26.5 - 33.0 pg    MCHC 33.5 31.5 - 36.5 g/dL    RDW 16.0 (H) 10.0 - 15.0 %    Platelet Count 43 (LL) 150 - 450 10e9/L    Diff Method Automated Method     % Neutrophils 69.2 %    % Lymphocytes 10.8 %    % Monocytes 12.4 %    % Eosinophils 6.8 %    % Basophils 0.4 %    % Immature Granulocytes 0.4 %    Nucleated RBCs 0 0 /100    Absolute Neutrophil 1.7 1.6 - 8.3 10e9/L    Absolute Lymphocytes 0.3 (L) 0.8 - 5.3 10e9/L    Absolute Monocytes 0.3 0.0 - 1.3 10e9/L    Absolute Eosinophils 0.2 0.0 - 0.7 10e9/L    Absolute Basophils 0.0 0.0 - 0.2 10e9/L    Abs Immature Granulocytes 0.0 0 - 0.4 10e9/L    Absolute Nucleated RBC 0.0    INR   Result Value Ref Range    INR 1.47 (H) 0.86 - 1.14   Comprehensive metabolic panel   Result Value Ref Range    Sodium 141 133 - 144 mmol/L    Potassium 3.6 3.4 - 5.3 mmol/L    Chloride 111 (H) 94 - 109 mmol/L    Carbon Dioxide 18 (L) 20 - 32 mmol/L    Anion Gap 12 3 - 14 mmol/L     Glucose 129 (H) 70 - 99 mg/dL    Urea Nitrogen 18 7 - 30 mg/dL    Creatinine 1.53 (H) 0.66 - 1.25 mg/dL    GFR Estimate 52 (L) >60 mL/min/[1.73_m2]    GFR Estimate If Black 60 (L) >60 mL/min/[1.73_m2]    Calcium 8.1 (L) 8.5 - 10.1 mg/dL    Bilirubin Total 1.4 (H) 0.2 - 1.3 mg/dL    Albumin 3.0 (L) 3.4 - 5.0 g/dL    Protein Total 6.8 6.8 - 8.8 g/dL    Alkaline Phosphatase 320 (H) 40 - 150 U/L    ALT 28 0 - 70 U/L    AST 42 0 - 45 U/L   Troponin I   Result Value Ref Range    Troponin I ES <0.015 0.000 - 0.045 ug/L   UA with Microscopic   Result Value Ref Range    Color Urine Yellow     Appearance Urine Clear     Glucose Urine Negative NEG^Negative mg/dL    Bilirubin Urine Negative NEG^Negative    Ketones Urine Negative NEG^Negative mg/dL    Specific Gravity Urine 1.011 1.003 - 1.035    Blood Urine Negative NEG^Negative    pH Urine 5.5 5.0 - 7.0 pH    Protein Albumin Urine 10 (A) NEG^Negative mg/dL    Urobilinogen mg/dL Normal 0.0 - 2.0 mg/dL    Nitrite Urine Negative NEG^Negative    Leukocyte Esterase Urine Negative NEG^Negative    Source Catheterized Urine     WBC Urine 3 0 - 5 /HPF    RBC Urine 1 0 - 2 /HPF    Bacteria Urine Few (A) NEG^Negative /HPF    Mucous Urine Present (A) NEG^Negative /LPF   EKG 12-lead, tracing only   Result Value Ref Range    Interpretation ECG Click View Image link to view waveform and result    ABO/Rh type and screen   Result Value Ref Range    ABO PENDING     Antibody Screen PENDING     Test Valid Only At          [facility]    Specimen Expires 02/15/2019    Influenza A/B antigen   Result Value Ref Range    Influenza A/B Agn Specimen Nasal     Influenza A Negative NEG^Negative    Influenza B Negative NEG^Negative     *Note: Due to a large number of results and/or encounters for the requested time period, some results have not been displayed. A complete set of results can be found in Results Review.          Critical Care time:    Medications -  No data to display            Assessments & Plan (with Medical Decision Making)   48 year old history of alcohol related cirrhosis and ascites to the with worsening dyspnea and generalized weakness.  The patient's wife reports that he has been unable to assist with transfer to the wheelchair for the last day.  Patient has been increasingly dyspneic.  He missed his paracentesis last week secondary to a fall.  He states that he injured his right leg but denies any ongoing pain and has been able to bear weight on his right leg per his wife's report.  She also reports melena for the past 2 days but the patient does not have melena on exam and his hemoglobin is stable.  The patient does have abdominal distention from ascites but no abdominal tenderness.  He does have a new right-sided pleural effusion that is moderate to large in size.  He also has some acute kidney injury with a creatinine of 1.5.  His wife reports a fever at home today but the patient is afebrile here and he has baseline pancytopenia.  Blood cultures have been obtained.  His urinalysis does not appear infected.  His chest radiograph does not have convincing evidence for infiltrate.  The patient will be admitted to the internal medicine service for further evaluation and management.  Suspect his symptoms are largely related to his ascites fluid and new right-sided pleural effusion.    I have reviewed the nursing notes.    I have reviewed the findings, diagnosis, plan and need for follow up with the patient.       Medication List      ASK your doctor about these medications    acetaminophen 325 MG tablet  Commonly known as:  TYLENOL  650 mg, Oral, EVERY 8 HOURS PRN  Ask about: Should I take this medication?     hydrOXYzine 25 MG tablet  Commonly known as:  ATARAX  25 mg, Oral, 2 TIMES DAILY PRN  Ask about: Should I take this medication?     miconazole 2 % external powder  Commonly known as:  MICATIN/MICRO GUARD  Topical, 2 TIMES DAILY, To groin as  "needed  Ask about: Should I take this medication?     polyethylene glycol 236 g suspension  Commonly known as:  GoLYTELY/NuLYTELY  4 L, Oral, ONCE, Refer to \"Getting Ready for a Colonoscopy\" instruction handout  Ask about: Should I take this medication?            Final diagnoses:   Dyspnea, unspecified type   Pleural effusion on right   Alcoholic cirrhosis of liver with ascites (H)       2/12/2019   [facility], [place], EMERGENCY DEPARTMENT     Neal Lee MD  02/12/19 1944    "

## 2019-02-12 NOTE — ED NOTES
Updated Wife Yisel that patient is here at the ED [place] vs [facility]. Verified with wife code status, per wife patient is DNR/DNI.

## 2019-02-12 NOTE — ED TRIAGE NOTES
patient has ascites r/t alcoholic liver cirrhosis per EMS ascites getting worst, now SOB with audible wheezing. Patient is scheduled for a paracenthesis donnie. at [facility]. Wife wanted patient to go to the  of  hosp.but currently on divert.

## 2019-02-12 NOTE — LETTER
Transition Communication Hand-off for Care Transitions to Next Level of Care Provider    Name: Mono Varghese  : 1970  MRN #: [id number]  Primary Care Provider: King Louis     Primary Clinic: [address] [place] [address]  [facility] [id number]     Reason for Hospitalization:  Pleural effusion on right [J90]  Alcoholic cirrhosis of liver with ascites (H) [K70.31]  Dyspnea, unspecified type [R06.00]  Admit Date/Time: 2019  3:28 PM  Discharge Date: 19  Payor Source: Payor: MEDICARE / Plan: MEDICARE / Product Type: Medicare /     Readmission Assessment Measure (URVASHI) Risk Score/category: Very high    Reason for Communication Hand-off Referral: Multiple providers/specialties    Discharge Plan:  TCU:  Barbi at [facility] [phone]  Fax [phone]      Discharge Needs Assessment:  Needs      Most Recent Value   Equipment Currently Used at Home  shower chair, grab bar, toilet, grab bar, tub/shower, wheelchair, manual, walker, standard, commode [hopeful to install stair lift ]          Already enrolled in Tele-monitoring program and name of program:  Unknown  Follow-up specialty is recommended: Yes    Follow-up plan:    Future Appointments   Date Time Provider Department Center   2019  1:50 PM King Louis MD [facility]   3/6/2019  1:30 PM Leroy Alcaraz MD [facility]   2019 10:00 AM [address]   5/3/2019 10:45 AM Lauro Shaw MD [facility]       Any outstanding tests or procedures:        Referrals     Future Labs/Procedures    Medication Therapy Management Referral     Comments:    MTM referral reason            Patient has Lactulose or Rifaxamin as a PTA Med or a Discharge medication      Non-adherence to medication regimen as evaluated by care coordinator in   flowsheet row.     Patient had a hospital or ED visit in last 6 months and has more than 10   PTA or Discharge medications    Patient has 5 PTA or Discharge Medications AND one of the following    diagnoses: DM,HF,COPD,AMI DX,PULM HTN       This service is designed to help you get the most from your medications.  A specially trained pharmacist will work closely with you and your doctors  to solve any problems related to your medications and to help you get the   best results from taking them.      The Medication Therapy Management staff will call you to schedule an appointment.    Occupational Therapy Adult Consult     Comments:    Evaluate and treat as clinically indicated.    Reason:  Physical deconditioning    Physical Therapy Adult Consult     Comments:    Evaluate and treat as clinically indicated.    Reason:  Physical deconditioning            FRANKLYN Felix, [facility]  5A Unit   Pager [phone]  Phone [phone]        AVS/Discharge Summary is the source of truth; this is a helpful guide for improved communication of patient story

## 2019-02-12 NOTE — ED NOTES
Bed: ED05  Expected date:   Expected time:   Means of arrival:   Comments:  [place] 48 yr old liver failure and cirrhosis

## 2019-02-12 NOTE — LETTER
Health Information Management Services               Recipient:  [facility]        Sender:  FRANKLYN Felix, LGSW  5A Unit   Phone [phone]          Date: February 19, 2019  Patient Name:  Mono Varghese  Routing Message:    Discharge orders. Thanks!        The documents accompanying this notice contain confidential information belonging to the sender.  This information is intended only for the use of the individual or entity named above.  The authorized recipient of this information is prohibited from disclosing this information to any other party and is required to destroy the information after its stated need has been fulfilled, unless otherwise required by state law.      If you are not the intended recipient, you are hereby notified that any disclosure, copying, distribution or action taken in reliance on the contents of these documents is strictly prohibited. If you have received this document in error, please notify [place] immediately at [phone].  You may return the document via fax ([phone]) or return mail  (Health Information Management, , [address] [place] [address]).

## 2019-02-13 NOTE — PROGRESS NOTES
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 SOB likely due to pleural effusion secondary to significant ascites.    EtOH Cirrhosis:   MELD of 16. Last EGD 5/2018 and last sigmoidoscopy 10/2018. Complications include hepatic encephalopathy, refractory ascites, rectal and esophageal varices s/p banding, thrombocytopenia. Asterixis noted on exam.   -Paracentesis    -Ascites fluid culture, gram stain, protein, albumin, cell count     -Rifaximin 559mg 2x daily  -IV Ceftriaxone 1g q24 hours  -TID lactulose 60mL  -Hydroxyzine 10g 3x daily  -[place] protocol    Pleural effusion   CXR shows R pleural effusion and R basilar atelectasis. SOB at rest, worsening over the past week. Likely secondary to significant ascites given R-sided location and worsening SOB associated with increasing ascites. Differential includes other transudative causes as well as exudative pathology such as malignancy, infection.   -Consider thoracentesis    Hx GIB  Reported history of melena/BRB per rectum however not apparent on rectal exam on admission and patient is currently hemodynamically stable. Patient at risk for bleed given history of varices. Typed and crossed. 2 large bore IVs in place. GI following.   - Omeprazole 40mg 2x daily    JOSEF: Creatinine increased to 1.53 on admission from baseline of approximately 0.8.  Continues to make urine.   - Holding PTA lasix and spironolactone for now    Chronic:  Hx seizures 2/2 excision of astrocytoma: Continue PTA Keppra 750mg 3x daily  Malnutrition:  Continue PTA folic acid, methylphenidate 5mg BID (per discontinue from recent TCU stay),  thiamine, Calcium-D3, Iron, multi vit  Depression: Restart Duloxetine pending med rec  Dry skin: Continue PTA ciclopirox  Hypothyroid: Continue levothyroxine 88mcg daily, follow up TSH  HLD: pravastatin apears to have been stopped during recent TCU stay  T2DM: ISS, hypoglycemia protocol  LENA: clarify if uses CPAP when possible    Diet: NPO for Medical/Clinical Reasons Except for: Meds    Fluids: Albumin challenge  DVT Prophylaxis: SCDs  Munoz Catheter: not present  Code Status: DNR/DNI  Difficult to discuss with patient, he did endorse this code status, but unclear if he understood.  However this is in line with prior code status     Disposition Plan   Expected discharge: 4 - 7 days, recommended to prior living arrangement once ascites drained, mental status at baseline.     Entered: Tip Jiménez 02/13/2019, 1:26 PM     The patient's care was discussed with the Attending Physician, Dr. Morris and Resident Physician, Dr. Little.     Tip Jiménez  Hospitalist Service, [address], [place]  Pager: [phone]  Please see sticky note for cross cover information    Resident/Fellow Attestation   I, Bradly Little, was present with the medical student who participated in the service and in the documentation of the note.  I have verified the history and personally performed the physical exam and medical decision making.  I agree with the assessment and plan of care as documented in the note.      Bradly Little MD  PGY3  Date of Service (when I saw the patient): 02/13/19      ______________________________________________________________________    Interval History    No significant events since admission. Patient's wife provides further history. She confirms that Mono did miss his last biweekly paracentesis. He began developing progressive SOB over the past week, to the point where he became short of breath while seated. While he has had significant ascites in the past,  he has never experienced SOB like this. Patient's wife states that her and Mono's goals for the current hospitalization are to gain some insight into the long-term outlook for Mono's cirrhosis and to meet with palliative to discuss options.     Data reviewed today: I reviewed all medications, new labs and imaging results over the last 24 hours.    Physical Exam   Vital Signs: Temp: 96.7  F (35.9  C) Temp src: Oral BP: (!) 131/94 Pulse: 67 Heart Rate: 74 Resp: 20 SpO2: 99 % O2 Device: Nasal cannula Oxygen Delivery: 3 LPM  Weight: 157 lbs 0 oz  Constitutional: Cachectic. Minimally verbal. Ill appearing.   Respiratory: Decreased breath sounds R base. No increased work of breathing.  Cardiovascular: Normal apical impulse, regular rate and rhythm, normal S1 and S2, no S3 or S4, and no murmur noted  GI:  Significantly enlarged abdomen. Diffuse tenderness to palpation. Positive fluid wave. No rigidity.   Skin: normal skin color, texture, turgor  Musculoskeletal: 1+ bidding edema to shins bilaterally   Neurologic: Alert. Oriented only to self.     Data   Recent Labs   Lab 02/13/19  0635 02/12/19  1756   WBC 1.9* 2.5*   HGB 9.2* 10.9*   * 98   PLT 35* 43*   INR 1.74* 1.47*    141   POTASSIUM 3.6 3.6   CHLORIDE 115* 111*   CO2 16* 18*   BUN 19 18   CR 1.39* 1.53*   ANIONGAP 12 12   UCHE 8.1* 8.1*   * 129*   ALBUMIN 3.0* 3.0*   PROTTOTAL 6.2* 6.8   BILITOTAL 1.2 1.4*   ALKPHOS 274* 320*   ALT 26 28   AST 33 42   TROPI  --  <0.015     Recent Results (from the past 24 hour(s))   XR Chest Port 1 View    Narrative    XR CHEST PORT 1 VW  2/12/2019 5:09 PM     HISTORY:  soa    COMPARISON: Film dated 11/26/2018    FINDINGS:  Right Port-A-Cath is in place. There is a moderate-large  right pleural effusion which is new since 11/26/2018. There is  associated right basilar infiltrate/atelectasis. Left lung is clear.      Impression    IMPRESSION: Moderate-large right pleural effusion. This is new  since  11/26/2018.    TESFAYE CAZARES MD     Medications     - MEDICATION INSTRUCTIONS -         calcium carbonate-vitamin D  1 tablet Oral Daily     cefTRIAXone  1 g Intravenous Q24H     ferrous sulfate  325 mg Oral BID     folic acid  1 mg Oral Daily     hydrOXYzine  10 mg Oral TID     insulin aspart  1-4 Units Subcutaneous Q4H     lactulose  60 mL Oral TID     levETIRAcetam  750 mg Oral TID     levothyroxine  88 mcg Oral Daily     methylphenidate  5 mg Oral BID     miconazole   Topical BID     mirtazapine  30 mg Oral At Bedtime     multivitamin w/minerals  1 tablet Oral BID     omeprazole  40 mg Oral BID     phytonadione  10 mg Intravenous Daily     rifaximin  550 mg Oral BID     vitamin B1  100 mg Oral Daily     vitamin D3  1,000 Units Oral Daily

## 2019-02-13 NOTE — PLAN OF CARE
"Time 5112-7680    Reason for admission: Shortness of breath, AMS, possible GI bleed  Vitals: Soft BPs in AM. 3L O2 via NC.    Activity: Up with A1 and walker. Sat up in chair after working with therapy.   Pain: Denies  Neuro: Disoriented to time - could not say the date or his birthday. Slow to respond but answers other questions appropriately.     Cardiac: No acute issues     Respiratory: Short of breath with certain positions and with activity. Pt on 3L O2 via NC. Pt reported breathing felt \"the same\" after returning from paracentesis.    GI/: Incontinent of urine x 1 and encouraged to use urinal. Had one large loose incontinent BM after sorbitol dose.   Diet: Regular diet.    Lines: 2 PIVs. Pt is allergic to Tegaderm. R PIV dressing reinforced with a special dressing.   Skin/Wounds: Scabs. Paracentesis site on RLQ of abdomen with small CDI primapore dressing.  Endocrine: BGs changed from q 4 hours to before meals and HS. Insulin pen ordered when patient's BG >230. Recheck before dinner 144 - sliding scale insulin given as ordered.   Labs/imaging: Hgb recheck this afternoon improved from AM draw.          New changes this shift: Paracentesis in IR with >9L off. Palliative team consult. Vitamin K given x 1. Sorbitol x 1 to assist in having BM.       Plan: Pt seems slower to respond to orientation questions this evening - will start [place] lactulose protocol.     Continue to monitor and follow POC        "

## 2019-02-13 NOTE — CONSULTS
Patient is on IR schedule 2/13/2019 for a dx/therapeutic para.   Labs WNL for procedure.      No NPO required.  Consent will be done prior to procedure with patient's spouse.     Please contact the IR charge RN at [phone] for estimated time of procedure.     Case discussed with Dr. Little. Mr. Varghese is a 48 year old male with a hx of ETOH cirrhosis c/b varices, recurrent ascites and HE. Reportedly gets outpt paracentesis every 2 weeks with 8-12 L off. Pt presented with AMS and SOB. Plan for paracentesis. Dx labs have been entered by primary team.    Christianne Lovell DNP, APRN  Interventional Radiology  Pager: [phone]

## 2019-02-13 NOTE — PLAN OF CARE
Alert and oriented, confused with date. Slow to respond. Denies pain. On 02 inhalation at 3 lpm via nasal cannula, O2 sats in the mid 90's. Verbalized abdominal discomfort due to distention. Incontinent of bowel and bladder. Refused borjas catheter insertion, voided this shift. Dr. Stafford seen pt and ordered to hold off borjas cath insertion. Maintained on NPO except meds, BG is 120. Has 2 PIV access in place. R chest port in place, not accessed. Turned and repositioned with check and change of pad. Vital signs stable. Possible paracentesis today. Will continue to monitor and follow plan of care

## 2019-02-13 NOTE — PROCEDURES
Interventional Radiology Brief Post Procedure Note    Procedure: IR PARACENTESIS    Proceduralist: Mari Oneal MD    Assistant: Radiology Resident Physician, Yovany Davenport DO    Time Out: Prior to the start of the procedure and with procedural staff participation, I verbally confirmed the patient s identity using two indicators, relevant allergies, that the procedure was appropriate and matched the consent or emergent situation, and that the correct equipment/implants were available. Immediately prior to starting the procedure I conducted the Time Out with the procedural staff and re-confirmed the patient s name, procedure, and site/side. (The Joint Commission universal protocol was followed.)  Yes    Medications   Medication Event Details Admin User Admin Time   Albumin 25% IV, 25 g  Lidocaine 1%  subcutaneous, 5ml     Sedation: None. Local Anesthestic used    Findings: Successful diagnostic and therapeutic paracentesis. 9.5 L of clear yellow fluid removed. 120 ml submitted for lab analysis.    Estimated Blood Loss: Minimal    SPECIMENS: Fluid and/or tissue for laboratory analysis    Complications:  None     Condition: Stable    Plan: Patient to return to floor for continued cares.    Comments: See dictated procedure note for full details.    Yovany Davenport DO

## 2019-02-13 NOTE — ED NOTES
[facility], [place]   ED Nurse to Floor Handoff     Mono Varghese is a 48 year old male who speaks English and lives with a spouse,  in a home  They arrived in the ED by ambulance from home    ED Chief Complaint: Shortness of Breath (patient has ascites r/t alcoholic liver cirrhosis per EMS ascites getting worst, now SOB with audible wheezing. Patient is scheduled for a paracenthesis donnie. at [facility].)    ED Dx;   Final diagnoses:   Dyspnea, unspecified type   Pleural effusion on right   Alcoholic cirrhosis of liver with ascites (H)         Needed?: No    Allergies:   Allergies   Allergen Reactions     Silver Nitrate      Tegaderm is ok if skip prep is used prior to application.     Latex Itching and Rash     No Clinical Screening - See Comments      Coban and Surgilast cause itching     Tegaderm Transparent Dressing (Informational Only)    .  Past Medical Hx:   Past Medical History:   Diagnosis Date     ADHD      Alcoholic cirrhosis of liver with ascites (H) 06/17/2015    cirrhosis secondary to alcohol, complicated by variceal bleeding and hepatic encephalopathy      Ascites due to alcoholic cirrhosis (H)     Requiring regular paracentesis.     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 status post subtotal resection by Dr. J Carlos Aranda in early 06/2013. Negative for 1p/19q deletions. S/P Concurrent chemoradiation July 15 through August 23, 2013. Initiated adjuvant oral temozolomide 9/17/13; switch     H/O LENA (obstructive sleep apnea)-moderate 12/18/2012    Pt states this is not currently an issue.     Hyperlipidemia LDL goal <100 8/1/2016     Hypothyroidism 8/1/2016     Liver failure (H)      Moderate major depression (H) 10/3/2012     Obesity      Pancytopenia (H) 8/1/2016    Chronic pancytopenia secondary to liver disease since at least 2012      Partial epilepsy with impairment of consciousness (H) 05/07/2014     Portal hypertensive gastropathy (H)      Portal vein thrombosis 12/18/2015    history of left lower extremity deep vein thrombosis as well as portal vein and superior mesenteric vein thrombosis, late 2015 when he was hospitalized in the ICU and seriously ill temporary IVC filter placed in 12/2015 when he was in the ICU with deep vein thromboses and active bleeding      Pulmonary nodules 6/17/2015     Rectal bleeding      Seizures (H)      Type 2 diabetes mellitus (H) 10/3/2012      Baseline Mental status: WDL  Current Mental Status changes: at basesline    Infection present or suspected this encounter: cultures pending  Sepsis suspected: No  Isolation type: No active isolations     Activity level - Baseline/Home:  Stand with Assist of 2  Activity Level - Current:   Total Care    Bariatric equipment needed?: No    In the ED these meds were given: Medications - No data to display    Drips running?  No    Home pump  No    Current LDAs  Peripheral IV 02/12/19 Left Upper forearm (Active)   Site Assessment WDL 2/12/2019  5:57 PM   Line Status Saline locked 2/12/2019  5:57 PM   Phlebitis Scale 0-->no symptoms 2/12/2019  5:57 PM   Infiltration Scale 0 2/12/2019  5:57 PM   Number of days: 0       Wound 12/02/18 Coccyx Suspected pressure ulcer (Active)   Number of days: 72        Rash 11/03/18 2250 Bilateral groin other (see comments) (Active)   Number of days: 101       Labs results:   Labs Ordered and Resulted from Time of ED Arrival Up to the Time of Departure from the ED   CBC WITH PLATELETS DIFFERENTIAL - Abnormal; Notable for the following components:       Result Value    WBC 2.5 (*)     RBC Count 3.31 (*)     Hemoglobin 10.9 (*)     Hematocrit 32.5 (*)     RDW 16.0 (*)     Platelet Count 43 (*)     Absolute Lymphocytes 0.3 (*)     All other components within normal limits   INR - Abnormal; Notable for the following components:    INR 1.47 (*)     All other components within normal limits   COMPREHENSIVE METABOLIC PANEL - Abnormal; Notable for the following components:    Chloride 111 (*)     Carbon Dioxide 18 (*)     Glucose 129 (*)     Creatinine 1.53 (*)     GFR Estimate 52 (*)     GFR Estimate If Black 60 (*)     Calcium 8.1 (*)     Bilirubin Total 1.4 (*)     Albumin 3.0 (*)     Alkaline Phosphatase 320 (*)     All other components within normal limits   ROUTINE UA WITH MICROSCOPIC - Abnormal; Notable for the following components:    Protein Albumin Urine 10 (*)     Bacteria Urine Few (*)     Mucous Urine Present (*)     All other components within normal limits   TROPONIN I   PERIPHERAL IV CATHETER   ABO/RH TYPE AND SCREEN   BLOOD CULTURE   BLOOD CULTURE   INFLUENZA A/B ANTIGEN       Imaging Studies:   Recent Results (from the past 24 hour(s))   XR Chest Port 1 View    Narrative    XR CHEST PORT 1 VW  2/12/2019 5:09 PM     HISTORY:  soa    COMPARISON: Film dated 11/26/2018    FINDINGS:  Right Port-A-Cath is in place. There is a moderate-large  right pleural effusion which is new since 11/26/2018. There is  associated right basilar infiltrate/atelectasis. Left lung is clear.      Impression    IMPRESSION: Moderate-large right pleural effusion. This is new since  11/26/2018.    TESFAYE CAZARES MD       Recent vital signs:   BP (!) 131/102   Pulse 80   Temp 97.5  F (36.4  C) (Oral)    Resp 20   SpO2 100%     Cardiac Rhythm: Normal Sinus  Pt needs tele? No  Skin/wound Issues: None    Code Status: DNR / DNI    Pain control: good    Nausea control: good    Abnormal labs/tests/findings requiring intervention: Pt has ascites with paracentesis scheduled on 2/13/19.    Family present during ED course? Yes   Family Comments/Social Situation comments: Pt lives with spouse and dog.  Pt has a hospice RN visit daily.      Tasks needing completion: None   Elisabeth Oglesby RN or Inez Wu RN    [phone] [facility]

## 2019-02-13 NOTE — PHARMACY-ADMISSION MEDICATION HISTORY
Admission Medication History status for the 2/12/2019 admission is complete.  See EPIC admission navigator for Prior to Admission medications.    Medication history sources:  Wife, Care Everywhere ([facility])     Medication history source reliability: Good    Medication adherence:  Good    Changes made to PTA medication list (reason)  Added: Advil -- patient has been taking regularly for past week since falling  Deleted:      -Tylenol 325 mg: take 2 tablets every 8 hrs prn pain -- not taking     -Augmentin 875-125 mg: take 1 tablet every 12 hrs for 4 days -- finished 10/18     -Tegretol 100 mg: taper down from 150 mg twice daily to stop -- finished 1/22/19     -Ceftriaxone 2 gm: inject 2 gm into veins daily x9 days -- finished 11/18     -Cipro 500 mg: take 1 tablet twice daily x2 days -- finished 9/18     -Hydroxyzine 25 mg: take 1 tablet twice daily prn itching -- ended 1/19     -Lidocaine 4% cream: apply once prn pain -- not taking      -Miconazole 2% powder: apply to grain twice daily prn -- ended 1/19      -GoLytely 236 g suspension: take 4,000 mLs once -- finished 10/18 (for colonoscopy)  Changed: None    Additional medication history information (including reliability of information, actions taken by pharmacist):   -Wife was a good historian.  -Patient takes 200 mg of Gabapentin 3 times daily although it is prescribed to use up to 300 mg 3 times daily.   -Patient takes lactulose 3 times daily despite being prescribed to take it 2 times daily.  -Patient takes afternoon Ritalin dose daily between noon and 2 pm.   -Patient received a flu shot this season.     Time spent in this activity: 30 minutes    Medication history completed by: DAVID Dale    Prior to Admission medications    Medication Sig Last Dose Taking? Auth Provider   Calcium Carb-Cholecalciferol (CALCIUM 500 +D) 500-400 MG-UNIT TABS Take 500 mg by mouth daily 2/12/2019 at AM Yes Beatriz Tanner MD   ciclopirox (LOPROX) 0.77 %  cream Apply topically 2 times daily To feet and toenails. 2/11/2019 at PM Yes King Quiles DPM   ferrous sulfate (IRON) 325 (65 FE) MG tablet Take 1 tablet (325 mg) by mouth 2 times daily 2/12/2019 at AM Yes Trevon Henson MD   folic acid (FOLVITE) 1 MG tablet Take 1 tablet (1 mg) by mouth daily 2/12/2019 at AM Yes King Louis MD   furosemide (LASIX) 20 MG tablet Take 1 tablet (20 mg) by mouth daily 2/12/2019 at AM Yes Beatriz Tanner MD   ibuprofen (ADVIL/MOTRIN) 200 MG tablet Take 600 mg by mouth 2 times daily as needed for mild pain 2/11/2019 at PM Yes Unknown, Entered By History   lactulose (CHRONULAC) 10 GM/15ML solution Take 60 mLs by mouth 2 times daily  Patient taking differently: Take 60 mLs by mouth 3 times daily  2/12/2019 at NOON Yes Beatriz Tanner MD   levETIRAcetam (KEPPRA) 750 MG tablet Take 1 tablet (750 mg) by mouth 3 times daily 2/12/2019 at AM Yes Anil English MD   levothyroxine (SYNTHROID/LEVOTHROID) 88 MCG tablet Take 1 tablet (88 mcg) by mouth daily 2/12/2019 at AM Yes King Louis MD   mirtazapine (REMERON) 45 MG tablet Take 1 tablet (45 mg) by mouth At Bedtime 2/11/2019 at PM Yes Yessi Danielson MD   multivitamin, therapeutic with minerals (THERA-VIT-M) TABS Take 1 tablet by mouth 2 times daily 2/12/2019 at AM Yes Reported, Patient   omeprazole (PRILOSEC) 20 MG CR capsule Take 2 capsules (40 mg) by mouth 2 times daily 2/12/2019 at AM Yes King Louis MD   pravastatin (PRAVACHOL) 80 MG tablet Take 1 tablet (80 mg) by mouth daily 2/11/2019 at PM Yes King Louis MD   spironolactone (ALDACTONE) 25 MG tablet Take 2 tablets (50 mg) by mouth daily 2/12/2019 at AM Yes Beatriz Tanner MD   thiamine (VITAMIN B-1) 100 MG tablet Take 1 tablet (100 mg) by mouth daily 2/12/2019 at AM Yes King Louis MD   traZODone (DESYREL) 50 MG tablet Take 1 tablet (50 mg) by mouth nightly as needed for sleep 2/11/2019 at PM  "Yes Yessi Danielson MD   vitamin B-12 (CYANOCOBALAMIN) 1000 MCG tablet 1 tablet (1,000 mcg) by Per G Tube route daily - Per G Tube 2/12/2019 at AM Yes King Louis MD   Vitamin D, Cholecalciferol, 1000 units TABS Take 1 tablet by mouth daily 2/12/2019 at AM Yes Unknown, Entered By History   XIFAXAN 550 MG TABS tablet TAKE ONE TABLET BY MOUTH TWICE DAILY 2/12/2019 at AM Yes Beatriz Tanner MD   blood glucose monitoring (ONETOUCH ULTRA) test strip Use to test blood sugars 4 times daily as needed or as directed.   King Louis MD   gabapentin (NEURONTIN) 100 MG capsule Take up to 300mg three times daily for agitation/anxiety   Yessi Danielson MD   methylphenidate (RITALIN) 10 MG tablet Take 1 tablet (10 mg) by mouth 2 times daily (take at 8AM and 2pm)   Yessi Danielson MD   ONE TOUCH LANCETS MISC by In Vitro route 4 times daily as needed   King Louis MD   order for DME Equipment being ordered: manual wheelchair and cushion    [employer] light weight Sx5 18x18, 17.5 inch floor height and a 2 inch 18x18 inch cushion    length of need : lifetime        Patient's height : 5/10\", weight : 172 pounds   King Loius MD     "

## 2019-02-13 NOTE — PLAN OF CARE
OT/5A: OT orders received and acknowledged. Upon attempt this afternoon, patient had just returned from paracentesis and declined initiation of OT. Both spouse and patient requested OT return on 2/14/19 in attempts to initiate services to maximize function in daily living activities. Will reschedule.

## 2019-02-13 NOTE — TELEPHONE ENCOUNTER
Social Work   Incoming/Outgoing Call  [facility]    Outgoing Call To: Yisel Varghese, wife    Reason for Call:  Referral from Dr. Alcaraz in regards to family needing assistance with care coordination of services, as family is having a difficult time meeting patient's increasing needs.    Response/Plan:  Writer spoke to Yisel, discussing Mono's needs and services.  A meeting with the [facility]  () is scheduled tomorrow to have Mono's PCA Waiver changed from family status to regular status so the family can hire a PCA to to meet Mono's needs.  Yisel is concerned about finding PCA(s) to accommodate the hours.  Yisel stated Mono has been admitted to the hospital-medically.  Family may pursue hospice based on the outcome of this hospitalization.  Yisel will follow-up with writer after meeting tomorrow with [facility].      Will route to patient's current psychiatric provider(s) as an FYI.   Please call or EPIC message with any questions or concerns.    TERRI Jaeger  Social Work Intern  [phone]

## 2019-02-13 NOTE — CONSULTS
[facility], [place]  Palliative Care Consultation Note    Patient: Mono Varghese  Date of Admission:  02/12/2019    Requesting provider/team: [facility]  Reason for consult:  Pain management      Patient and family support    Recommendations:   - Ongoing goals of care conversations are appropriate; palliative care will plan to follow up tomorrow to continue   - Will involve palliative SW to assist with coping and offer additional family support   - DNR/DNI confirmed       These recommendations have been discussed with primary team.    Thank you for the opportunity to participate in the care of this patient and family. Our team will follow. Please feel free to contact the on-call Palliative provider with any urgent needs.     TIESHA Jones CNP  Palliative Care Consult Team  Pager: [phone]    [facility] Inpatient Team Consult pager [phone] (M-F 8-4:30)  After-hours Answering Service [phone]   Palliative Clinic: [phone]     100 minutes spent, with >50% counseling and in care coordination  Face-to-face: [phone]    Assessment:  Mono Varghese is a 48 year old male with ESLD c/b refractory ascites, hepatic encephalopathy, and esophageal varices, who was admitted for AMS and SOB. Paracentesis done today - 9.5 liters removed.     Social:    Living situation: with his wife, Yisel  Support system: Yisel, and mother-in-law, whom had been main care giver but recently had worsening health and is no longer able to physically help   Functional status: requires assistance with ADLs; limited mobility keeps Mono on one level of his home   Substance use disorder (past / present): alcohol   Occupation: now currently able to work, but used to work as a    Hobbies: playing cards, listening to heavy metal (loves going to [employer] - last able to do this about 8 months ago)     Mental Health: hx of depression; on Duloxetine     Spiritual/Sikhism:   Spiritual  "background: was Anabaptism, until daughter  15 years ago   Spiritual needs: declined     Prognostic Information:  Discussed that liver disease is chronic in nature and with time, will will continue to progress and likely cause his death.     Advance Care Planning:          Decision making capacity: has the capability to participate in a conversation about his wishes, but would include his family in any important decisions  Disease understanding: he understands he has liver disease, understands his quality of life is poor due to his liver disease, and also states that he will \"live forever\"   Goals of Care: to live longer   Health care directive: on file   Health care agent: spouse, Yisel Varghese   Code Status:  DNR / DNI  POLST on file indicates DNR and only intubation as a trial, if expected to be temporary     History of Present Illness   Sources of History:patient and electronic health record.     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 MELD on admission was 16. Presented with pleural effusion 2/2 hepatic hydrothorax and JOSEF.     This is Mono's 4th hospitalization since October.     Palliative care was consulted for support / continuity of care and pain management. Mono is well known to our team from past hospitalizations. I met with Mono and his wife, Yisel, today and we reviewed the role of our team, such as symptom management, assistance navigating chronic illness and goals for treatment, counseling, psychosocial and spiritual support. Yisel was tearful during the majority of the visit and said she feels like she is torturing Mono; she constantly has to force him to take his medication and participate in treatment. Yisel said Mono's entire day is " "sitting in front of the TV in a wheelchair, having 6-8 stools a day (about half are incontinent). Mono agreed and said it is \"no life.\" We discussed her fears that Mono will need to go to a nursing home soon, because she does not think she can care for him at home much longer, unless she can get more assistance. Mono said he would prefer to be at home, but would be willing to go to a nursing facility if needed; this would not change his wishes of to trying to live longer. We clarified his code status is DNR/DNI.     Mono and Yisel also shared about multiple traumas (Mono being put up for adoption at the age of 18 months, feeling abandoned by his adoptive family, Yisel having a PE during pregnancy and Mono preforming CPR - followed by the loss of their baby, amongst \"many more\"). They identified each other as their primary coping mechanism.     We agreed that Mono would continue to think about if there is a situation he would not want his life prolonged, and what is important to him in the end of his life, knowing that he does have a life-limiting illness.     ROS:  A comprehensive ROS has been negative other than stated in the HPI and below:   Palliative Symptom Review (0=no symptom/no concern, 1=mild, 2=moderate, 3=severe):  Pain: 1  Fatigue: 2  Nausea: 0  Constipation: 0  Diarrhea: 3 -- due to lactulose   Depressive Symptoms: 0  Anxiety: 0  Drowsiness: 1  Poor Appetite: 0  Shortness of Breath: 1  Insomnia: 0  Delirium: 1       Past Medical History:   Past Medical History:   Diagnosis Date     ADHD      Alcoholic cirrhosis of liver with ascites (H) 06/17/2015    cirrhosis secondary to alcohol, complicated by variceal bleeding and hepatic encephalopathy      Ascites due to alcoholic cirrhosis (H)     Requiring regular paracentesis.     Chronic pain 8/1/2016     Depressive disorder 02/01/2001     Encephalopathy, hepatic (H) 7/29/2017     GERD (gastroesophageal reflux disease)      GIB (gastrointestinal " bleeding) 11/23/2015    Admitted to the ICU 11/2015 for hemorrhagic shock 2/2 variceal bleed with subsequent sequelae of shock liver, multi organ dysfunction including altered mental status of unknown etiology, multiple thrombosis, decompensated liver cirrhosis, hematologic abnormalities, and JOSEF s/p banding at the end of 03/2016      H/O Oligoastrocytoma of parietal lobe (H) 06/11/2013    Presented acute onset of right-sided seizures in 4/2013. 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 status post subtotal resection by Dr. J Carlos Aranda in early 06/2013. Negative for 1p/19q deletions. S/P Concurrent chemoradiation July 15 through August 23, 2013. Initiated adjuvant oral temozolomide 9/17/13; switch     H/O LENA (obstructive sleep apnea)-moderate 12/18/2012    Pt states this is not currently an issue.     Hyperlipidemia LDL goal <100 8/1/2016     Hypothyroidism 8/1/2016     Liver failure (H)      Moderate major depression (H) 10/3/2012     Obesity      Pancytopenia (H) 8/1/2016    Chronic pancytopenia secondary to liver disease since at least 2012      Partial epilepsy with impairment of consciousness (H) 05/07/2014     Portal hypertensive gastropathy (H)      Portal vein thrombosis 12/18/2015    history of left lower extremity deep vein thrombosis as well as portal vein and superior mesenteric vein thrombosis, late 2015 when he was hospitalized in the ICU and seriously ill temporary IVC filter placed in 12/2015 when he was in the ICU with deep vein thromboses and active bleeding      Pulmonary nodules 6/17/2015     Rectal bleeding      Seizures (H)      Type 2 diabetes mellitus (H) 10/3/2012          Past Surgical History:   Past Surgical History:   Procedure Laterality Date     ABDOMINAL PARACENTESIS  11/27/2018          COLONOSCOPY N/A 11/27/2015    Procedure: COMBINED COLONOSCOPY, SINGLE OR MULTIPLE BIOPSY/POLYPECTOMY BY BIOPSY;   Surgeon: Ran Thurston MD;  Location: [facility]     ENDOSCOPIC ULTRASOUND LOWER GASTROINTESTIONAL TRACT (GI) N/A 10/15/2018    Procedure: Rectal Endoscopic Ultrasound **Latex Allergy** with coiling and glueing of rectal varices;  Surgeon: Guru Jose Kelly MD;  Location: [facility] OR     ENDOSCOPIC ULTRASOUND UPPER GASTROINTESTINAL TRACT (GI) N/A 10/1/2018    Procedure: ENDOSCOPIC ULTRASOUND, ESOPHAGOSCOPY / UPPER GASTROINTESTINAL TRACT (GI);;  Surgeon: Guru Jose Kelly MD;  Location: [facility] OR     ESOPHAGOSCOPY, GASTROSCOPY, DUODENOSCOPY (EGD), COMBINED N/A 11/23/2015    Procedure: COMBINED ESOPHAGOSCOPY, GASTROSCOPY, DUODENOSCOPY (EGD);  Surgeon: Rocael Rizvi MD;  Location: [facility]     ESOPHAGOSCOPY, GASTROSCOPY, DUODENOSCOPY (EGD), COMBINED N/A 1/25/2017    Procedure: COMBINED ESOPHAGOSCOPY, GASTROSCOPY, DUODENOSCOPY (EGD), BIOPSY SINGLE OR MULTIPLE;  Surgeon: Rocael Tejada MD;  Location: [facility]     ESOPHAGOSCOPY, GASTROSCOPY, DUODENOSCOPY (EGD), COMBINED N/A 3/30/2017    Procedure: COMBINED ESOPHAGOSCOPY, GASTROSCOPY, DUODENOSCOPY (EGD);  Surgeon: Jordana Ramirez MD;  Location: [facility]     ESOPHAGOSCOPY, GASTROSCOPY, DUODENOSCOPY (EGD), COMBINED N/A 1/19/2018    Procedure: COMBINED ESOPHAGOSCOPY, GASTROSCOPY, DUODENOSCOPY (EGD);  Upper Endoscopy with verceal banding; Flexible Sigmoidoscopy;  Surgeon: Guru Jose Kelly MD;  Location: [facility]     ESOPHAGOSCOPY, GASTROSCOPY, DUODENOSCOPY (EGD), COMBINED N/A 2/12/2018    Procedure: COMBINED ESOPHAGOSCOPY, GASTROSCOPY, DUODENOSCOPY (EGD);  Esophagogastroduodenoscopy with variceal banding;  Surgeon: Guru Jose Kelly MD;  Location: [facility]     ESOPHAGOSCOPY, GASTROSCOPY, DUODENOSCOPY (EGD), COMBINED N/A 3/5/2018    Procedure: COMBINED ESOPHAGOSCOPY, GASTROSCOPY, DUODENOSCOPY (EGD);  Esophagogastroduodenoscopy  with banding of varices Latex Allergy ;  Surgeon: Guru Robin  Jose Langley MD;  Location:  OR     ESOPHAGOSCOPY, GASTROSCOPY, DUODENOSCOPY (EGD), COMBINED N/A 4/16/2018    Procedure: COMBINED ESOPHAGOSCOPY, GASTROSCOPY, DUODENOSCOPY (EGD);  Upper Endoscopy  with esophageal varices banding; Latex Allergy ;  Surgeon: Guru Jose Kelly MD;  [facility]     ESOPHAGOSCOPY, GASTROSCOPY, DUODENOSCOPY (EGD), COMBINED N/A 5/30/2018    Procedure: COMBINED ESOPHAGOSCOPY, GASTROSCOPY, DUODENOSCOPY (EGD);  upper endoscopy with banding of esophageal varices. *latex Allergy*;  Surgeon: Guru Jose Kelly MD;  Location:  OR     OPTICAL TRACKING SYSTEM CRANIOTOMY, EXCISE TUMOR, COMBINED  6/6/2013    Procedure: COMBINED OPTICAL TRACKING SYSTEM CRANIOTOMY, EXCISE TUMOR;  Left Stealth Guided Craniotomy , Tumor Resection ;  Surgeon: J Carlos Aranda MD;  Location:  OR     ORTHOPEDIC SURGERY      hand surgery- right     SIGMOIDOSCOPY FLEXIBLE N/A 11/24/2015    Procedure: SIGMOIDOSCOPY FLEXIBLE;  Surgeon: Rocael Rizvi MD;  Location:  GI     SIGMOIDOSCOPY FLEXIBLE N/A 1/19/2018    Procedure: SIGMOIDOSCOPY FLEXIBLE;;  Surgeon: Guru Jose Kelly MD;  Location:  OR     SIGMOIDOSCOPY FLEXIBLE N/A 10/1/2018    Procedure: SIGMOIDOSCOPY FLEXIBLE;;  Surgeon: Guru Jose Kelly MD;  Location:  OR             Family History:   Family History   Adopted: Yes   Problem Relation Age of Onset     Medical History Unknown Mother      Cirrhosis Father      Medical History Unknown Brother      Medical History Unknown Brother      Medical History Unknown Brother           Allergies:   Allergies   Allergen Reactions     Silver Nitrate      Tegaderm is ok if skip prep is used prior to application.     Latex Itching and Rash     No Clinical Screening - See Comments      Coban and Surgilast cause itching     Tegaderm Transparent Dressing (Informational Only)             Medications:   I have reviewed this patient's  medication profile and medications given in the past 24 hours.    Hydroxyzine 10 mg TID   Lactulose 60 ml TID + TID prn   Ritalin 5 mg BID  Remeron 45 mg at bedtime     Melatonin 1 mg at bedtime prn--0  Trazodone 50 mg at bedtime prn--0         Physical Exam:   Vital Signs: Temp: 96.7  F (35.9  C) Temp src: Oral BP: (P) 139/90 Pulse: 67 Heart Rate: (P) 68 Resp: (P) 20 SpO2: (P) 100 % O2 Device: (P) Nasal cannula Oxygen Delivery: 3 LPM  Weight: 157 lbs 0 oz    Constitutional: Cachectic male, seen lying in hospital bed. Frail appearing, but in no acute distress.  Head: + temporal muscle wasting. Atraumatic. MMM.   Extremities: Warm and well perfused.   Pulm: Non-labored breathing, on NC.   Musculoskeletal: HONG. Generalized weakness present, but no obvious malformations.   Skin: No concerning rashes or lesions on exposed areas.   Neuro: Alert. Disoriented to time and situation. EOMI.   Psych: Speech slow and deliberate. Affect flat.        Data reviewed:      BMP  Recent Labs   Lab 02/13/19  0635 02/12/19  1756    141   POTASSIUM 3.6 3.6   CHLORIDE 115* 111*   UCHE 8.1* 8.1*   CO2 16* 18*   BUN 19 18   CR 1.39* 1.53*   * 129*     CBC  Recent Labs   Lab 02/13/19  0635 02/12/19  1756   WBC 1.9* 2.5*   RBC 2.74* 3.31*   HGB 9.2* 10.9*   HCT 28.0* 32.5*   * 98   MCH 33.6* 32.9   MCHC 32.9 33.5   RDW 16.0* 16.0*   PLT 35* 43*     INR  Recent Labs   Lab 02/13/19  0635 02/12/19  1756   INR 1.74* 1.47*

## 2019-02-13 NOTE — PROGRESS NOTES
Patient Name: Mono Varghese  Medical Record Number: [id number]  Today's Date: 2/13/2019    Procedure: Paracentesis  Proceduralist: Dr.Golzarian Moyer     Procedure start time: 1305  Procedure end time: 1405    Report given to: JAYSON RN     Pt arrived to IR room 6 from  via stretcher on 3LNC. Consent obtained by spouse reviewed. Pt denies any questions or concerns regarding procedure. Pt positioned supine and monitored per protocol. Pt tolerated procedure without any noted complications. Pt transferred back to     9.3L removed 25g albumin administered per order

## 2019-02-13 NOTE — PLAN OF CARE
5A - up with FWW and hand on gait belt assist x1    Discharge Planner PT   Patient plan for discharge: Per wife - home with potential of hospice care [Agreeable to TCU of necessary]   Current status: PT evaluation acknowledged and completed. Greeted pt supine in bed with wife at bedside and agreeable to therapy with some encouragement. AVSS on 3L O2 and appropriate to mobilize. Oriented to self and place, needs verbal cues from wife for time.Engaged pt in supine to sitting at EOB with HOB elevated at CGA. Progressed to sit <> stand (requiring multiple attempts) with height of bed elevated at min A x1-2. Initiated ambulation with FWW at CGA ~10ft. Discussed discharge recommendation.   Barriers to return to prior living situation: medical condition, cognition, home environment (12+ steps to bedroom)   Recommendations for discharge: TCU  Rationale for recommendations: Pt is recommended to discharge to TCU setting for improved bed mobility, transfers and ambulation in order to safely return to home setting. Will update as necessary. Pts wife indicates they are interested in getting a hospital bed for the main level which would eliminate stairs or a stair lift.        Entered by: Kerri Hall 02/13/2019 10:41 AM

## 2019-02-13 NOTE — PROGRESS NOTES
[facility]  Patient is currently open to home care services with [facility].  The patient is currently receiving RN/OT/HA/SW  services.  [facility]  and team have been notified of patient admission.  [facility] liaison will continue to follow patient during stay.  If appropriate provide orders to resume home care at time of discharge.    Thank you  Sindi Gagnon RN, BSN  [facility] Liaison  [facility]  [phone]

## 2019-02-13 NOTE — H&P
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   Now presented with encephalopathy and shortness of breath.  Found to have right pleural effusion which likely is due to hepatic hydrothorax in the setting of recently missing paracentesis/massive ascites.  Mild concern for GIB given reported history in of dark stools and rectal bleeding, however not present on my exam and patient is hemodynamically stable at this time.      Hx EtOH Cirrhosis:   Hepatic encephalopathy:  MELD on admission 16 (largely driven by elevated creatinine). Complicated by:  Hepatic encephalopathy/Refractory ascites/Rectal and Esophageal Varicies s/p banding, last EGD 10/2018/Thrombocytopenia.  HE likely with asterixis on exam today.  Suspect pleural effusion is 2/2 hepatic hydrothorax.    - Defer thoracentesis   - rifaximin continued  - [place] protocol  - TID lactulose scheduled  - Ceftriaxone started  - Borjas ordered   Strict I/O tracking  - Would plan for paracentesis in the AM  - Follow up blood cultures  - Repeat labs in the AM    Hx GIB: Reported history of melena/BRB per rectum however not apparent on rectal exam and patient is currently hemodynamically stable.  With history of varicies patient is certainly high risk for bleed however, I cannot find any evidence of bleeding at this time.    - Type and cross done  - 2 large bore IVs to be placed  - Continue home PPI  - Consider GI consult in the AM  - CTM hgb    JOSEF: Creatinine increased to 1.53 from baseline.  Continues to make urine.  - Considered borjas, but patient urinated profusely prior to placement and resisted placement attempt.  Consider borjas for strict I/O   - Holding  PTA lasix and spironolactone for now  - Follow up urine culture  - Consider continued albumin challenge   Will give conservative 25g bolus given risk of respiratory compromise  - CTM     Chronic:  Hx seizures 2/2 excision of astrocytoma: Continue PTA Keppra, follow up level  Malnutrition:  Continue PTA folic acid, methylphenidate 5mg BID (per discontinue from recent TCU stay), thiamine, Calcium-D3, Iron, multi vit  Depression: Restart Duloxetine pending med rec  Dry skin: Continue PTA ciclopirox  Hypothyroid: continue lt4, follow up TSH  HLD: pravastatin apears to have been stopped during recent TCU stay  T2DM: ISS, hypoglycemia protocol  LENA: clarify if uses CPAP when possible      Diet: NPO for Medical/Clinical Reasons Except for: Meds    Fluids: Albumin challenge  DVT Prophylaxis: SCDs  Munoz Catheter: not present  Code Status: DNR/DNI  Difficult to discuss with patient, he did endorse this code status, but unclear if he understood.  However this is in line with prior code status    Disposition Plan   Expected discharge: 4 - 7 days, recommended to prior living arrangement once mental status at baseline.  Entered: Saul Stafford MD 02/12/2019, 11:05 PM       Patient to be formally staffed in the AM    Saul Stafford MD  [facility], [place]  Pager: [phone]  Please see sticky note for cross cover information  ______________________________________________________________________    Chief Complaint   Shortness of breath    History is obtained from the patient who is a poor historian due to disorientation.     History of Present Illness   Mono Varghese is a 48 year old male who presented to the ER with increasing shortness of breath and weakness. On my exam he does endorse shortness of breath but is unable to describe duration and is only oriented to self.  Wife has left for the night.      Please refer to ED notes for more complete history of present  "illness obtained while patient's wife was present:    \"Mono Varghese is a 48 year old male who presents to the emergency department with increasing shortness of breath and generalized weakness.  The patient has a history of alcohol related liver disease.  He has cirrhosis and ascites.  He typically gets paracentesis every other week but missed his last paracentesis secondary to a fall and a right leg injury.  For the past 2 days, the patient has complained of increasing dyspnea.  He denies any chest pain.  His abdomen has come increasingly distended.  He denies any abdominal pain.  He denies any nausea or vomiting.  He is on lactulose.  His wife reports that he has had black stools for the past 3 days.  He had 7 stools yesterday.  The patient also had some bright red blood per rectum earlier today.  His stools have been soft.  Additionally, the patient's wife states that he had a fever to 101.1 earlier today.  He has not taken any antipyretics.  He denies any sore throat.  No rhinorrhea.  No dysuria, urgency, or frequency.  He did have an influenza vaccination this year.  The patient's wife does state that he has been able to bear weight normally on his right leg since the fall.  He denies any pain in his right hip, thigh, knee, leg, ankle, and foot now.  The patient's wife endorses a cough for the past several days as well.\"    Review of Systems    Endorses shortness of breath and disorientation.  Denies abd pain.  10 point ROS completed and negative, however suspect patient is unable to provide accurate history.    Past Medical History    I have reviewed this patient's medical history and updated it with pertinent information if needed.   Past Medical History:   Diagnosis Date     ADHD      Alcoholic cirrhosis of liver with ascites (H) 06/17/2015    cirrhosis secondary to alcohol, complicated by variceal bleeding and hepatic encephalopathy      Ascites due to alcoholic cirrhosis (H)     Requiring regular " paracentesis.     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 status post subtotal resection by Dr. J Carlos Aranda in early 06/2013. Negative for 1p/19q deletions. S/P Concurrent chemoradiation July 15 through August 23, 2013. Initiated adjuvant oral temozolomide 9/17/13; switch     H/O LENA (obstructive sleep apnea)-moderate 12/18/2012    Pt states this is not currently an issue.     Hyperlipidemia LDL goal <100 8/1/2016     Hypothyroidism 8/1/2016     Liver failure (H)      Moderate major depression (H) 10/3/2012     Obesity      Pancytopenia (H) 8/1/2016    Chronic pancytopenia secondary to liver disease since at least 2012      Partial epilepsy with impairment of consciousness (H) 05/07/2014     Portal hypertensive gastropathy (H)      Portal vein thrombosis 12/18/2015    history of left lower extremity deep vein thrombosis as well as portal vein and superior mesenteric vein thrombosis, late 2015 when he was hospitalized in the ICU and seriously ill temporary IVC filter placed in 12/2015 when he was in the ICU with deep vein thromboses and active bleeding      Pulmonary nodules 6/17/2015     Rectal bleeding      Seizures (H)      Type 2 diabetes mellitus (H) 10/3/2012      Past Surgical History   I have reviewed this  patient's surgical history and updated it with pertinent information if needed.  Past Surgical History:   Procedure Laterality Date     ABDOMINAL PARACENTESIS  11/27/2018          COLONOSCOPY N/A 11/27/2015    Procedure: COMBINED COLONOSCOPY, SINGLE OR MULTIPLE BIOPSY/POLYPECTOMY BY BIOPSY;  Surgeon: Ran Thurston MD;  Location: [facility]     ENDOSCOPIC ULTRASOUND LOWER GASTROINTESTIONAL TRACT (GI) N/A 10/15/2018    Procedure: Rectal Endoscopic Ultrasound **Latex Allergy** with coiling and glueing of rectal varices;  Surgeon: Guru Jose Kelly MD;  Location: [facility] OR     ENDOSCOPIC ULTRASOUND UPPER GASTROINTESTINAL TRACT (GI) N/A 10/1/2018    Procedure: ENDOSCOPIC ULTRASOUND, ESOPHAGOSCOPY / UPPER GASTROINTESTINAL TRACT (GI);;  Surgeon: Guru Jose Kelly MD;  Location: [facility] OR     ESOPHAGOSCOPY, GASTROSCOPY, DUODENOSCOPY (EGD), COMBINED N/A 11/23/2015    Procedure: COMBINED ESOPHAGOSCOPY, GASTROSCOPY, DUODENOSCOPY (EGD);  Surgeon: Rocael Rizvi MD;  Location: [facility]     ESOPHAGOSCOPY, GASTROSCOPY, DUODENOSCOPY (EGD), COMBINED N/A 1/25/2017    Procedure: COMBINED ESOPHAGOSCOPY, GASTROSCOPY, DUODENOSCOPY (EGD), BIOPSY SINGLE OR MULTIPLE;  Surgeon: Rocael Tejada MD;  Location: [facility]     ESOPHAGOSCOPY, GASTROSCOPY, DUODENOSCOPY (EGD), COMBINED N/A 3/30/2017    Procedure: COMBINED ESOPHAGOSCOPY, GASTROSCOPY, DUODENOSCOPY (EGD);  Surgeon: Jordana Ramirez MD;  Location: [facility]     ESOPHAGOSCOPY, GASTROSCOPY, DUODENOSCOPY (EGD), COMBINED N/A 1/19/2018    Procedure: COMBINED ESOPHAGOSCOPY, GASTROSCOPY, DUODENOSCOPY (EGD);  Upper Endoscopy with verceal banding; Flexible Sigmoidoscopy;  Surgeon: Guru Jose Kelly MD;  Location: [facility]     ESOPHAGOSCOPY, GASTROSCOPY, DUODENOSCOPY (EGD), COMBINED N/A 2/12/2018    Procedure: COMBINED ESOPHAGOSCOPY, GASTROSCOPY, DUODENOSCOPY (EGD);  Esophagogastroduodenoscopy with variceal banding;  Surgeon: Robin  Guru Jose Langley MD;  Location: [facility]     ESOPHAGOSCOPY, GASTROSCOPY, DUODENOSCOPY (EGD), COMBINED N/A 3/5/2018    Procedure: COMBINED ESOPHAGOSCOPY, GASTROSCOPY, DUODENOSCOPY (EGD);  Esophagogastroduodenoscopy  with banding of varices Latex Allergy ;  Surgeon: Guru Jose Kelly MD;  [facility]: [facility]     ESOPHAGOSCOPY, GASTROSCOPY, DUODENOSCOPY (EGD), COMBINED N/A 4/16/2018    Procedure: COMBINED ESOPHAGOSCOPY, GASTROSCOPY, DUODENOSCOPY (EGD);  Upper Endoscopy  with esophageal varices banding; Latex Allergy ;  Surgeon: Guru Jose Kelly MD;  [facility]: [facility]     ESOPHAGOSCOPY, GASTROSCOPY, DUODENOSCOPY (EGD), COMBINED N/A 5/30/2018    Procedure: COMBINED ESOPHAGOSCOPY, GASTROSCOPY, DUODENOSCOPY (EGD);  upper endoscopy with banding of esophageal varices. *latex Allergy*;  Surgeon: Guru Jose Kelly MD;  Location:  OR     OPTICAL TRACKING SYSTEM CRANIOTOMY, EXCISE TUMOR, COMBINED  6/6/2013    Procedure: COMBINED OPTICAL TRACKING SYSTEM CRANIOTOMY, EXCISE TUMOR;  Left Stealth Guided Craniotomy , Tumor Resection ;  Surgeon: J Carlos Aranda MD;  Location:  OR     ORTHOPEDIC SURGERY      hand surgery- right     SIGMOIDOSCOPY FLEXIBLE N/A 11/24/2015    Procedure: SIGMOIDOSCOPY FLEXIBLE;  Surgeon: Rocael Rizvi MD;  Location:  GI     SIGMOIDOSCOPY FLEXIBLE N/A 1/19/2018    Procedure: SIGMOIDOSCOPY FLEXIBLE;;  Surgeon: Guru Jose Kelly MD;  Location:  OR     SIGMOIDOSCOPY FLEXIBLE N/A 10/1/2018    Procedure: SIGMOIDOSCOPY FLEXIBLE;;  Surgeon: Guru Jose Kelly MD;  Location:  OR      Social History   I have reviewed this patient's social history and he is unable to provide additional details at this time    Family History   I have reviewed this patient's family history and updated it with pertinent information if needed.   Family History   Adopted: Yes   Problem Relation Age of Onset     Medical History Unknown  Mother      Cirrhosis Father      Medical History Unknown Brother      Medical History Unknown Brother      Medical History Unknown Brother      Prior to Admission Medications   Prior to Admission Medications   Prescriptions Last Dose Informant Patient Reported? Taking?   Calcium Carb-Cholecalciferol (CALCIUM 500 +D) 500-400 MG-UNIT TABS 2/12/2019 at AM  No Yes   Sig: Take 500 mg by mouth daily   ONE TOUCH LANCETS MISC   No No   Sig: by In Vitro route 4 times daily as needed   Vitamin D, Cholecalciferol, 1000 units TABS 2/12/2019 at AM  Yes Yes   Sig: Take 1 tablet by mouth daily   XIFAXAN 550 MG TABS tablet 2/12/2019 at AM  No Yes   Sig: TAKE ONE TABLET BY MOUTH TWICE DAILY   blood glucose monitoring (ONETOUCH ULTRA) test strip   No No   Sig: Use to test blood sugars 4 times daily as needed or as directed.   ciclopirox (LOPROX) 0.77 % cream 2/11/2019 at PM  No Yes   Sig: Apply topically 2 times daily To feet and toenails.   ferrous sulfate (IRON) 325 (65 FE) MG tablet 2/12/2019 at AM  No Yes   Sig: Take 1 tablet (325 mg) by mouth 2 times daily   folic acid (FOLVITE) 1 MG tablet 2/12/2019 at AM  No Yes   Sig: Take 1 tablet (1 mg) by mouth daily   furosemide (LASIX) 20 MG tablet 2/12/2019 at AM  No Yes   Sig: Take 1 tablet (20 mg) by mouth daily   gabapentin (NEURONTIN) 100 MG capsule   No No   Sig: Take up to 300mg three times daily for agitation/anxiety   ibuprofen (ADVIL/MOTRIN) 200 MG tablet 2/11/2019 at PM  Yes Yes   Sig: Take 600 mg by mouth 2 times daily as needed for mild pain   lactulose (CHRONULAC) 10 GM/15ML solution 2/12/2019 at NOON  No Yes   Sig: Take 60 mLs by mouth 2 times daily   Patient taking differently: Take 60 mLs by mouth 3 times daily    levETIRAcetam (KEPPRA) 750 MG tablet 2/12/2019 at AM  No Yes   Sig: Take 1 tablet (750 mg) by mouth 3 times daily   levothyroxine (SYNTHROID/LEVOTHROID) 88 MCG tablet 2/12/2019 at AM  No Yes   Sig: Take 1 tablet (88 mcg) by mouth daily   methylphenidate  "(RITALIN) 10 MG tablet   No No   Sig: Take 1 tablet (10 mg) by mouth 2 times daily (take at 8AM and 2pm)   mirtazapine (REMERON) 45 MG tablet 2019 at PM  No Yes   Sig: Take 1 tablet (45 mg) by mouth At Bedtime   multivitamin, therapeutic with minerals (THERA-VIT-M) TABS 2019 at AM Self Yes Yes   Sig: Take 1 tablet by mouth 2 times daily   omeprazole (PRILOSEC) 20 MG CR capsule 2019 at AM  No Yes   Sig: Take 2 capsules (40 mg) by mouth 2 times daily   order for DME   No No   Sig: Equipment being ordered: manual wheelchair and cushion    [employer] light weight Sx5 18x18, 17.5 inch floor height and a 2 inch 18x18 inch cushion    length of need : lifetime        Patient's height : 510\", weight : 172 pounds   pravastatin (PRAVACHOL) 80 MG tablet 2019 at PM  No Yes   Sig: Take 1 tablet (80 mg) by mouth daily   spironolactone (ALDACTONE) 25 MG tablet 2019 at AM  No Yes   Sig: Take 2 tablets (50 mg) by mouth daily   thiamine (VITAMIN B-1) 100 MG tablet 2019 at AM  No Yes   Sig: Take 1 tablet (100 mg) by mouth daily   traZODone (DESYREL) 50 MG tablet 2019 at PM  No Yes   Sig: Take 1 tablet (50 mg) by mouth nightly as needed for sleep   vitamin B-12 (CYANOCOBALAMIN) 1000 MCG tablet 2019 at AM  No Yes   Si tablet (1,000 mcg) by Per G Tube route daily - Per G Tube      Facility-Administered Medications: None     Allergies   Allergies   Allergen Reactions     Silver Nitrate      Tegaderm is ok if skip prep is used prior to application.     Latex Itching and Rash     No Clinical Screening - See Comments      Coban and Surgilast cause itching     Tegaderm Transparent Dressing (Informational Only)        Physical Exam   Vital Signs: Temp: 97.6  F (36.4  C) Temp src: Oral BP: 126/66 Pulse: 94 Heart Rate: 76 Resp: 20 SpO2: 96 % O2 Device: Nasal cannula Oxygen Delivery: 3 LPM  Weight: 157 lbs 0 oz    General Appearance: Cachetic chronically ill appearing male in NAD  HEENT: Anicteric, no " conjunctival injection, MMM, no oral/perioral blood, no menigismus  Respiratory: Decreased breath sounds at right base.  Normal WOB.    Cardiovascular: RRR, NMRG  GI: Protuberant abd is non tender.  Positive fluid wave.    Genitourinary: Incontinent of copius yellow urine on exam  Skin: WWP, no lesions/rashes noted  Musculoskeletal: 1+ pitting edema bilaterally to the shins  Neurologic: Alert.  Oriented only to self.  PERRL.  CN nerve exam limited by participation.  Grossly nonfocal    Data   Data reviewed today: I reviewed all medications, new labs and imaging results over the last 24 hours.

## 2019-02-13 NOTE — PLAN OF CARE
VSS on 3 LPM NC. Pt disoriented to time. Pt arrived on unit around 2200 PM for Mod/large R pleural effusion. Pt had un accessed port on R chest wall- has not been accessed for 3 weeks according to RN giving report upon admission. PIV SL. Abd distended- possible paracentesis tomorrow. Incontinent of both urine and stool. No BM on shift. Will continue to monitor and follow POC.

## 2019-02-14 NOTE — PLAN OF CARE
PT: Attempted to see patient for PT this morning but adamantly declined. Unable at this time to encourage. Patient continually rubbing his stomach throughout, attempted to see if patient was in pain or nauseated but very challenging to understand. Will check back later if schedule allows.

## 2019-02-14 NOTE — PLAN OF CARE
Alert, disoriented to time and place. Forgetful. On room air, 02 sats in the mid to high 90's.  Denies pain . Incontinent of urine and stool. [place] score is 1, extra dose of Lactulose given. Had 3 BMs overnight. R port not accessed. BG monitored overnight. Turned and repositioned. Slept at intervals. Vital signs stable. Will continue to monitor and follow plan of care

## 2019-02-14 NOTE — PROGRESS NOTES
Palliative Care Inpatient Clinical Social Work Assessment    Patient Information: Mono Varghese is a 48 year old male with ESLD c/b refractory ascites, hepatic encephalopathy, and esophageal varices, who was admitted for AMS and SOB.    Visit Summary: I met with Mono today and mother in law Faby today in a joint visit with Ladi MOTLEY. I introduced myself and my role on the team. Mono and Caitie are known to me from a prior hospital stay. Caitie shared that she has a new diagnosis of heart failure and that taking care of Mono at home as become more challenging. She and Yisel are hoping to get either care more care in the home or to potentially start looking at long term care facilities. Mono shared that he is in agreement with this and that he would like to continue with all medical treatments in hopes to live as long as possible.     Assessment: Mono is able to engage in conversation and appears to have an understanding of how complex his needs are. Caitie is understandably struggling with her new diagnosis and the idea of not caring for Mono.     Relevant Symptoms/Concerns     Physical:  Mono is requiring full care at home and struggles with incontinence several times a day.   Psychological/Emotional/Existential:     Family/Social/Caregiver:   Faby and Yisel have been able to care for Mono at home and take turns day and night. Faby noticed that she was out a breath more often and would have to take breaks walking up the stairs. Because of this, she went to the doctor and was diagnosed with heart failure. She does not think she can care for Mono anymore but struggles with the idea that she is not doing enough for him.    Developmental:     Mental Health:     End of Life:     Cultural/Faith/Spiritual:     Grief/Loss:     Concurrent Stressors:       Comments:      Strengths     Physical: Mono is able to engage in conversation and can make sense of simple discussions.     Psychological/Emotional/Existential:     Family/Social/Caregiver:     Developmental:     Mental Health:     End of Life:     Cultural/Taoism/Spiritual:     Grief/Loss:        Comments:      Goals/Decision Making/Advance Care Planning   Preferences:     Concerns:  Family is wondering what the next steps are to evaluate long term care facilities. They are hoping for a list of facilities near their home. I informed them that I would reach out to the unit  to see if she had any resources that might be helpful.    Documents:     Decision Making Issues:       Comments:      Resource Needs     Discharge Planning:  Per Unit/Program  and/or Care Coordinator   Other:     Comments:      Sources of Information   Patient:  X   Family:  X-Mother in law Faby   Staff:  X   Chart Review:  X   Other:      Intervention (Check all that apply)    X   Assessment of palliative specific issues      X   Introduction of Palliative clinical social work interventions       Adjustment to illness counseling       Advanced care planning       Attended/participated in care conference       Behavioral interventions for symptom management    X   Facilitation of processing of thoughts/feelings       Family communication facilitated       Grief counseling    X   Goals of care discussion/facilitation       Life legacy work       Life review facilitation       Psychoeducation       Re-framing       Resource referral       Other:       Comments:      Plan:  I will follow up with Mono and family next week if they are still in the hospital.     Judy ESTES, ELDON  Palliative Care Social Work Fellow  Pager: [phone]    [facility] Inpatient Team Consult pager [phone] (M-F 8-4:30)  After-hours Answering Service [phone]

## 2019-02-14 NOTE — PROGRESS NOTES
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 Pleural effusion likely secondary to significant ascites. Therapeutic paracentesis completed.     EtOH Cirrhosis:   MELD of 16. Last EGD 5/2018 and last sigmoidoscopy 10/2018. Complications include hepatic encephalopathy, refractory ascites, rectal and esophageal varices s/p banding, thrombocytopenia. Asterixis noted on exam. Paracentesis completed on 2/14/2019. Ascites fluid labs wnl, WBC 56.   -Discontinue IV Ceftriaxone     -Rifaximin 550mg 2x daily  -TID lactulose 60mL  -Hydroxyzine 10g 3x daily  -[place] protocol    Pleural effusion   CXR shows R pleural effusion and R basilar atelectasis. SPO2 97% on RA. Effusion ikely secondary to significant ascites given R-sided location and SOB associated with recent ascites accumulation. Pneumonia possible, although unlikely given lack of fevers, minimal leukocytosis from baseline, negative strep pneumo Ag, procal wnl. Differential includes other transudative causes as well as exudative pathology such as malignancy, infection.   -Thoracentesis   -protein, LDH, gram stain, glucose, culture, cell    Count     Hx GIB  Reported history of melena/BRB per rectum however not apparent on rectal exam on admission and patient is currently hemodynamically stable. Patient at risk for bleed given history of varices. Typed and crossed. 2 large bore IVs in place. GI following.   - Omeprazole 40mg 2x daily    JOSEF: Creatinine 1.0-1.1 at baseline; 1.53 on admission   Continues to make urine.   - Holding PTA lasix and spironolactone for now    Chronic:  Hx seizures 2/2  excision of astrocytoma: Continue PTA Keppra 750mg 3x daily  Malnutrition:  Continue PTA folic acid, methylphenidate 5mg BID (per discontinue from recent TCU stay), thiamine, Calcium-D3, Iron, multi vit  Depression: Restart Duloxetine pending med rec  Dry skin: Continue PTA ciclopirox  Hypothyroid: Continue levothyroxine 88mcg daily, follow up TSH  HLD: pravastatin apears to have been stopped during recent TCU stay  T2DM: ISS, hypoglycemia protocol  LENA: clarify if uses CPAP when possible    Diet: NPO for Medical/Clinical Reasons Except for: Meds    Fluids: Albumin challenge  DVT Prophylaxis: SCDs  Munoz Catheter: not present  Code Status: DNR/DNI  Difficult to discuss with patient, he did endorse this code status, but unclear if he understood.  However this is in line with prior code status     Disposition Plan   Expected discharge: 4 - 7 days, recommended to prior living arrangement once ascites drained, mental status at baseline.     Entered: Tip Jiménez 02/14/2019, 1:25 PM     The patient's care was discussed with the Attending Physician, Dr. Morris and Resident Physician, Dr. Little.     Tip Jiménez  Hospitalist Service, [address], [place]  Pager: [phone]  Please see sticky note for cross cover information    Resident/Fellow Attestation   I, Bradly Little, was present with the medical student who participated in the service and in the documentation of the note.  I have verified the history and personally performed the physical exam and medical decision making.  I agree with the assessment and plan of care as documented in the note.        Bradly Little MD  PGY3  Date of Service (when I saw the patient): 02/14/19      ______________________________________________________________________    Interval History    No significant events overnight. No N/V or abdominal pain.     Data reviewed today: I reviewed all medications, new labs and imaging results  over the last 24 hours.    Physical Exam   Vital Signs: Temp: 95.8  F (35.4  C) Temp src: Axillary BP: 114/73 Pulse: 68 Heart Rate: 70 Resp: 18 SpO2: 98 % O2 Device: None (Room air) Oxygen Delivery: 3 LPM  Weight: 183 lbs 12.8 oz  Constitutional: Cachectic. Minimally verbal. Ill appearing.   Respiratory: Decreased breath sounds R base. No increased work of breathing.  Cardiovascular: Normal apical impulse, regular rate and rhythm, normal S1 and S2, no S3 or S4, and no murmur noted  GI:  Significantly enlarged abdomen. Mild tenderness to palpation. Positive fluid wave. No rigidity.   Skin: normal skin color, texture, turgor  Musculoskeletal: 1+ bidding edema to shins bilaterally   Neurologic: Alert. Oriented only to self.     Data   Recent Labs   Lab 02/14/19  0625 02/13/19  1549 02/13/19  0635 02/12/19  1756   WBC 2.1*  --  1.9* 2.5*   HGB 10.4* 10.1* 9.2* 10.9*   *  --  102* 98   PLT 34* 30* 35* 43*   INR 1.59*  --  1.74* 1.47*     --  142 141   POTASSIUM 3.5  --  3.6 3.6   CHLORIDE 114*  --  115* 111*   CO2 18*  --  16* 18*   BUN 22  --  19 18   CR 1.28*  --  1.39* 1.53*   ANIONGAP 8  --  12 12   UCHE 8.5  --  8.1* 8.1*   GLC 98  --  106* 129*   ALBUMIN 3.1*  --  3.0* 3.0*   PROTTOTAL 6.4*  --  6.2* 6.8   BILITOTAL 1.8*  --  1.2 1.4*   ALKPHOS 272*  --  274* 320*   ALT 27  --  26 28   AST 39  --  33 42   TROPI  --   --   --  <0.015     No results found for this or any previous visit (from the past 24 hour(s)).  Medications     albumin human       - MEDICATION INSTRUCTIONS -         calcium carbonate-vitamin D  1 tablet Oral Daily     ferrous sulfate  325 mg Oral BID     folic acid  1 mg Oral Daily     hydrOXYzine  10 mg Oral TID     insulin aspart  1-7 Units Subcutaneous TID AC     insulin aspart  1-5 Units Subcutaneous At Bedtime     lactulose  60 mL Oral TID     levETIRAcetam  750 mg Oral TID     levothyroxine  88 mcg Oral Daily     methylphenidate  5 mg Oral BID     miconazole   Topical BID      mirtazapine  30 mg Oral At Bedtime     multivitamin w/minerals  1 tablet Oral BID     omeprazole  40 mg Oral BID     phytonadione  10 mg Intravenous Daily     rifaximin  550 mg Oral BID     sodium chloride (PF)  3 mL Intracatheter Q8H     vitamin B1  100 mg Oral Daily     vitamin D3  1,000 Units Oral Daily

## 2019-02-14 NOTE — PLAN OF CARE
PT: Re-attempted to see patient for a second time in the afternoon but patient going down for paracentesis. Will see tomorrow and progress per POC.

## 2019-02-14 NOTE — PLAN OF CARE
Pt. A/Ox2, disoriented to time and intermittently situation.  Per family, pt mildly confused at baseline.  PRN lactulose given x1 along with scheduled doses.  Pt denied pain.discomfort.  VSS on RA.  Platelets 34.  Prior to thoracentesis, pt will need platelet transfusion.  Old paracentesis site CDI.  Denies SOB.  Pt incontinent  of urine, and stool. PIV SL in  between vitamin K and blood transfusion. Pt up w/ Assist 1 w/ walker.

## 2019-02-14 NOTE — PROCEDURES
Interventional Radiology Brief Post Procedure Note    Procedure: IR THORACENTESIS    Proceduralist: Stan Lua MD    Assistant: IR Fellow Physician, Devendra Naranjo MD    Time Out: Prior to the start of the procedure and with procedural staff participation, I verbally confirmed the patient s identity using two indicators, relevant allergies, that the procedure was appropriate and matched the consent or emergent situation, and that the correct equipment/implants were available. Immediately prior to starting the procedure I conducted the Time Out with the procedural staff and re-confirmed the patient s name, procedure, and site/side. (The Joint Commission universal protocol was followed.)  Yes    Medications   Medication Event Details Admin User Admin Time       Sedation: None. Local Anesthestic used    Findings: Successful right thoracentesis.  1000ml clear yellow fluid removed.     Estimated Blood Loss: 5ml    Fluoroscopy Time: 0 minute(s)    SPECIMENS: Fluid and/or tissue for laboratory analysis    Complications: 1. None     Condition: Stable    Plan:   Post op observation as ordered.    Comments: See dictated procedure note for full details.    Devendra Naranjo MD

## 2019-02-14 NOTE — PROGRESS NOTES
Care Coordinator Progress Note    Admission Date/Time:  2/12/2019  Attending MD:  Fly Morris MD    Data  Chart reviewed, discussed with interdisciplinary team.   Patient was admitted for:    Dyspnea, unspecified type  Pleural effusion on right  Alcoholic cirrhosis of liver with ascites (H).    Concerns with insurance coverage for discharge needs: None.  Current Living Situation: Patient lives with spouse.  Support System: Supportive and Involved  Services Involved: Home Care  Transportation at Discharge: [place] transportation,  [employer] Provide A Ride [phone] and Family or friend will provide  Transportation to Medical Appointments:   - Name of caregiver: Yisel, spouse  Barriers to Discharge: Medical stability, safe discharge disposition    Coordination of Care and Referrals: No new services at this time.     Assessment  Patient is a 48yr old male with a history of alcohol related liver disease who was admitted with increasing shortness of breath and generalized weakness. Writer introduced self and role of RNCC to patient, and his mother in law Faby. Faby stated that they are working on getting PCA services through the [facility], as he has been authorized for 8hrs/day. At this time, patient is open to [facility]. In order to discharge to home safely, PCA services would need to be established due to his caregivers (spouse and mother in law) being either physically unable to assist with cares or out of the home at work. Patient's mother in law stated that patient continues to want to live as long as possible and is aggreable to discharge to TCU while services are established in the home. SW updated on likely TCU placement needed at time of discharge. RNCC will continue to follow for discharge planning.      Plan  Anticipated Discharge Date:  TBD  Anticipated Discharge Plan:  Home w/[facility] and PCA services vs TCU placement    Yisel Prabhakar, ELANACC, BSN    [facility]  [address] [place] [address]    [email or web address]  [email or web address]    Office: [phone] Pager: [phone]  To contact weekend RNCC, dial [phone] and enter pager number [phone] at prompt. This pager can not be contacted by text page or outside line.

## 2019-02-14 NOTE — CONSULTS
Patient is on IR schedule 2/14/2019 for a Therapeutic/diagnostic right thoracentesis.   Labs WNL for procedure.    No NPO required.   Consent will be done prior to procedure.   Hepatic hydrothorax ESLD   Please contact the IR charge RN at [phone] for estimated time of procedure.       Evelina Peacock IR RPA  [phone] Call pager  [phone] pager

## 2019-02-14 NOTE — PLAN OF CARE
OT 5A: Hold - OT consult received. Per chart review and discussion with PT, pt may have limited/no acute OT needs. Pt receives assist for ADLs/IADLs and with hx of cognitive impairments. Per chart, pt with continued discussion regarding goals of care and potential need to transition to LTC. PT will continue to follow pt and notify OT if OT evaluation is indicated.

## 2019-02-14 NOTE — PLAN OF CARE
Pt oriented to self. Slow to respond, forgetful and confused at times. Left PIV SL. Thoracentesis with 1L removed. Breathing comfortably on RA. Incontinent of B&B. Large loose BM's x 5 this shift.  BG's AC and HS, stable. Able to eat and swallow pills. Bed alarm on. Continue with POC.

## 2019-02-14 NOTE — PROGRESS NOTES
"[facility], [place]  Palliative Care Progress Note    Patient: Mono Varghese  Date of Admission:  2/12/2019    Recommendations:  - continue life prolonging medical treatment  - patient is accepting of living in a nursing facility, if needed   - DNR/DNI    Assessment  Mono Varghese is a 48 year old male with ESLD c/b refractory ascites, hepatic encephalopathy, and esophageal varices, who was admitted for AMS and SOB. Paracentesis done yesterday, with 9.5 liters removed.     Goals of care:  Judy Lacey, Palliative ERNST, and I met with Mono and his mother-in-law today. Yisel was at home for a meeting with the [facility] access hospital about resources available to them. Mono did not remember much about my conversation with him and Yisel yesterday, but gave very similar answers to my questions today. We reviewed that his liver disease is chronic in nature and expected to worsen and eventually end his life. His main goal remains to live as long as possible, regardless of burden that comes with medical treatments. He would accept living in a nursing home, but would prefer to work toward returning home if possible. He cannot envision a situation where he would say \"enough is enough\" but understands that he can tell his medical team at any time if the burden becomes greater than the benefit of treatment.     Mono denies symptom burden at this time and has clear goals for treatment, so a palliative medical provider will not plan on following up. Palliative SW will continue to follow for coping and additional support; see Judy Lacey's note for details on follow up plan.       TIESHA Jones CNP  Palliative Care Consult Team  Pager: [phone]    [facility] Inpatient Team Consult pager [phone] (M-F 8-4:30)  After-hours Answering Service [phone]   Palliative Clinic: [phone]     70 minutes spent, with >50% counseling and in care coordination  Face-to-face: [phone]     Interval " History:   No acute events overnight. Thoracentesis planned in IR today. Good appetite and tolerating regular diet. Mother-in-law at bedside and supportive.         Medications:   I have reviewed this patient's medication profile and medications during this hospitalization.    Hydroxyzine 10 mg TID   Lactulose 60 ml TID + TID prn   Keppra 750 mg TID  Ritalin 5 mg BID  Remeron 30 mg at bedtime   Omeprazole 40 mg BID     Melatonin 1 mg at bedtime prn--0  Trazodone 50 mg at bedtime prn--0        Review of Systems:   A comprehensive ROS has been negative other than stated in the HPI and below:   Palliative Symptom Review (0=no symptom/no concern, 1=mild, 2=moderate, 3=severe):  Pain: 1  Fatigue: 2  Nausea: 0  Constipation: 0  Diarrhea: 3 -- due to lactulose   Depressive Symptoms: 0  Anxiety: 0  Drowsiness: 1  Poor Appetite: 0  Shortness of Breath: 1  Insomnia: 0  Delirium: 1        Physical Exam:   Vital Signs: Temp: 95.8  F (35.4  C) Temp src: Axillary BP: 114/73 Pulse: 68 Heart Rate: 70 Resp: 18 SpO2: 98 % O2 Device: None (Room air) Oxygen Delivery: 3 LPM  Weight: 183 lbs 12.8 oz    Constitutional: Cachectic male, seen sitting up in hospital bed, eating lunch. Frail appearing, but in no acute distress.  Head: + temporal muscle wasting. Atraumatic. MMM.   Extremities: Warm and well perfused.   Pulm: Non-labored breathing, on NC.   Musculoskeletal: HONG. Generalized weakness present, but no obvious malformations.   Skin: No concerning rashes or lesions on exposed areas.   Neuro: Alert. Disoriented to time and situation. EOMI.   Psych: Speech slow and deliberate. Affect flat.     Data Reviewed:   CMP  Recent Labs   Lab 02/14/19  0625 02/13/19  0635 02/12/19  1756    142 141   POTASSIUM 3.5 3.6 3.6   CHLORIDE 114* 115* 111*   CO2 18* 16* 18*   ANIONGAP 8 12 12   GLC 98 106* 129*   BUN 22 19 18   CR 1.28* 1.39* 1.53*   GFRESTIMATED 65 59* 52*   GFRESTBLACK 75 68 60*   UCHE 8.5 8.1* 8.1*   PROTTOTAL 6.4* 6.2* 6.8    ALBUMIN 3.1* 3.0* 3.0*   BILITOTAL 1.8* 1.2 1.4*   ALKPHOS 272* 274* 320*   AST 39 33 42   ALT 27 26 28     CBC  Recent Labs   Lab 02/14/19  0625 02/13/19  1549 02/13/19  0635 02/12/19  1756   WBC 2.1*  --  1.9* 2.5*   RBC 3.08*  --  2.74* 3.31*   HGB 10.4* 10.1* 9.2* 10.9*   HCT 31.9*  --  28.0* 32.5*   *  --  102* 98   MCH 33.8*  --  33.6* 32.9   MCHC 32.6  --  32.9 33.5   RDW 15.9*  --  16.0* 16.0*   PLT 34* 30* 35* 43*     INR  Recent Labs   Lab 02/14/19  0625 02/13/19  0635 02/12/19  1756   INR 1.59* 1.74* 1.47*

## 2019-02-15 NOTE — PROGRESS NOTES
[facility], [facility] Progress Note - Hospitalist Service, Piedad Daivs       Date of Admission:  2/12/2019    Assessment & Plan   Mono Varghese is a 47yo M with a PMHx of ESLD with HE, rectal and esophageal varices, PVT (not on AC), and L parietal astrocytoma s/p resection who was admitted on 2/12/2019 with ascites, encephalopathy, pleural effusion and history concerning for GI bleed. Pleural effusion likely secondary to significant ascites.     EtOH Cirrhosis:   MELD of 16. Last EGD 5/2018 and last sigmoidoscopy 10/2018. Complications include hepatic encephalopathy, refractory ascites, rectal and esophageal varices s/p banding, thrombocytopenia. Asterixis noted on exam. Paracentesis completed on 2/14/2019. Ascites fluid labs wnl, WBC 56.      -Rifaximin 550mg 2x daily  -TID lactulose 60mL  -Hydroxyzine 10g 3x daily  -[place] protocol  -start lasix/spironolactone & uptitrate as needed    Transudative Pleural effusion   Thoracentesis performed, transudative fluid based on Lights criteria. Effusion likely secondary to significant ascites given R-sided location and SOB associated with recent ascites accumulation. Pneumonia unlikely given lack of fevers, minimal leukocytosis from baseline, negative strep pneumo Ag, procal wnl.     Hx GIB  Reported history of melena/BRB per rectum however not apparent on rectal exam on admission and patient is currently hemodynamically stable. Patient at risk for bleed given history of varices. Typed and crossed. 2 large bore IVs in place. GI following.   - Omeprazole 40mg 2x daily    JOSEF: Creatinine 1.0-1.1 at baseline; 1.53 on admission   Continues to make urine.   - Holding PTA lasix and spironolactone for now    Chronic:  Hx seizures 2/2 excision of astrocytoma: Continue PTA Keppra 750mg 3x daily  Malnutrition:  Continue PTA folic acid, methylphenidate 5mg BID (per discontinue from recent TCU stay), thiamine, Calcium-D3, Iron, multi  vit  Depression: Restart Duloxetine pending med rec  Dry skin: Continue PTA ciclopirox  Hypothyroid: Continue levothyroxine 88mcg daily, follow up TSH  HLD: pravastatin apears to have been stopped during recent TCU stay  T2DM: ISS, hypoglycemia protocol  LENA: clarify if uses CPAP when possible    Diet: NPO for Medical/Clinical Reasons Except for: Meds    Fluids: Albumin challenge  DVT Prophylaxis: SCDs  Munoz Catheter: not present  Code Status: DNR/DNI  Difficult to discuss with patient, he did endorse this code status, but unclear if he understood.  However this is in line with prior code status     Disposition Plan   Expected discharge: 4 - 7 days, recommended to prior living arrangement once ascites drained, mental status at baseline.     Entered: Tip Jiménez 02/15/2019, 5:36 PM     The patient's care was discussed with the Attending Physician, Dr. Morirs and Resident Physician, Dr. Little.     Tip Jiménez  Hospitalist Service, [address], [place]  Pager: [phone]  Please see sticky note for cross cover information    Resident/Fellow Attestation   I, Bradly Little, was present with the medical student who participated in the service and in the documentation of the note.  I have verified the history and personally performed the physical exam and medical decision making.  I agree with the assessment and plan of care as documented in the note.        Bradly Little MD  PGY3  Date of Service (when I saw the patient): 02/15/19      ______________________________________________________________________    Interval History    No significant events overnight. Discussed discharge planning with patient and wife. Wife continues to look into PCA placement with the [facility]. Hospital care coordinators looking into intermediate care facility placement. [place] decision between wife and patient not to seek hospice care.    Data reviewed today: I reviewed all medications,  new labs and imaging results over the last 24 hours.    Physical Exam   Vital Signs: Temp: 95.8  F (35.4  C) Temp src: Oral BP: 111/74 Pulse: 76 Heart Rate: 64 Resp: 16 SpO2: 96 % O2 Device: None (Room air)    Weight: 183 lbs 12.8 oz  Constitutional: Cachectic. Minimally verbal. Ill appearing.   Respiratory: Clear breath sounds bilaterally.  Cardiovascular:  RRR  GI:   Abdomen enlarged  Skin: normal skin color, texture, turgor  Musculoskeletal: 1+ bidding edema to shins bilaterally   Neurologic: Alert. Oriented only to self.     Data   Recent Labs   Lab 02/15/19  0545 02/14/19  0625 02/13/19  1549 02/13/19  0635 02/12/19  1756   WBC  --  2.1*  --  1.9* 2.5*   HGB  --  10.4* 10.1* 9.2* 10.9*   MCV  --  104*  --  102* 98   PLT  --  34* 30* 35* 43*   INR 1.68* 1.59*  --  1.74* 1.47*   NA  --  140  --  142 141   POTASSIUM  --  3.5  --  3.6 3.6   CHLORIDE  --  114*  --  115* 111*   CO2  --  18*  --  16* 18*   BUN  --  22  --  19 18   CR 1.22 1.28*  --  1.39* 1.53*   ANIONGAP  --  8  --  12 12   UCHE  --  8.5  --  8.1* 8.1*   GLC  --  98  --  106* 129*   ALBUMIN 3.1* 3.1*  --  3.0* 3.0*   PROTTOTAL 6.2* 6.4*  --  6.2* 6.8   BILITOTAL 1.8* 1.8*  --  1.2 1.4*   ALKPHOS 287* 272*  --  274* 320*   ALT 26 27  --  26 28   AST 35 39  --  33 42   TROPI  --   --   --   --  <0.015     Recent Results (from the past 24 hour(s))   XR Chest 2 Views    Narrative    XR CHEST 2 VW  2/15/2019 4:35 PM      HISTORY: new dyspnea    COMPARISON: 2/12/2019    FINDINGS: Right IJ Port-A-Cath tip projecting over the right atrium,  unchanged. Normal heart size. Partial obscuration of the right cardiac  silhouette, similar to prior examination. Pulmonary vascular showed  within normal limits. The interstitial pulmonary opacities are  decreased compared to prior examination. Slightly decreased moderate  to large right pleural effusion and compressive atelectasis. Upper  abdomen is unremarkable.      Impression    IMPRESSION:   1. Slightly decreased  moderate to large right pleural effusion and  compressive atelectasis.  2. Decreased pulmonary interstitial opacities, likely resolving  pulmonary edema.    I have personally reviewed the examination and initial interpretation  and I agree with the findings.    MIRELLA DURÁN MD     Medications     albumin human       - MEDICATION INSTRUCTIONS -         calcium carbonate-vitamin D  1 tablet Oral Daily     ferrous sulfate  325 mg Oral BID     folic acid  1 mg Oral Daily     furosemide  20 mg Oral Daily     hydrOXYzine  10 mg Oral TID     insulin aspart  1-7 Units Subcutaneous TID AC     insulin aspart  1-5 Units Subcutaneous At Bedtime     lactulose  60 mL Oral TID     levETIRAcetam  750 mg Oral TID     levothyroxine  88 mcg Oral Daily     methylphenidate  5 mg Oral BID     miconazole   Topical BID     mirtazapine  30 mg Oral At Bedtime     multivitamin w/minerals  1 tablet Oral BID     omeprazole  40 mg Oral BID     rifaximin  550 mg Oral BID     sodium chloride (PF)  3 mL Intracatheter Q8H     spironolactone  50 mg Oral Daily     vitamin B1  100 mg Oral Daily     vitamin D3  1,000 Units Oral Daily

## 2019-02-15 NOTE — PLAN OF CARE
"Time: 6359-3884  Reason for admission/Dx:   Pleural effusion on right [J90]  Alcoholic cirrhosis of liver with ascites (H) [K70.31]  Dyspnea, unspecified type [R06.00]     [Vitals]: /76 (BP Location: Left arm)   Pulse 76   Temp 96.9  F (36.1  C) (Oral)   Resp 18   Ht 1.676 m (5' 6\")   Wt 83.4 kg (183 lb 12.8 oz)   SpO2 98%   BMI 29.67 kg/m        [BG]:   Glucose Values Bedside Glucose (mg/dl )  GLUCOSE   Latest Ref Rng & Units - 70 - 99 mg/dL   2/15/2019 -- 241(H)   2/15/2019 -- 95   Some recent data might be hidden       -sliding scale insulin provided.     [Imaging]: STAT Chest XR ordered for recent SOB. XR pending      [Cardiac]: WDL  [Pain/PRN/s]: WDL, denies pain    [Respiratory]: Ex, pt LS clear bilaterally early morning.  Upon reassessment, pt RUL and RLL coarse, and pt reporting SOB.  O2 Sats 98% on RA.    [Lines]:  PIV SL.  Pt reported itching at IV site r/t tegaderm.  Called vascular access regarding dressing and said to use primapore.  Currently IV has primapore dressimgs over site,     [Infusions]: Vitamin K infused this morning    [Neuros]: Ex, per family pt is confused at baseline.  Pt noted to be more confused around 1:30 PM.  (Pt having difficulty finding words, and not following commands intermittently, inattention) Scheduled lactulose given.   Reassessment to be done in an hour for possible additional lactulose.              [GI]:Orders Placed This Encounter      Regular Diet Adult    -Pt had poor appetite this shift. Did not order breakfast or lunch.  However, pt did drink milkshake wife had brought him.  -1 Lrg incontinent stool this afternoon.    [Activity]: Pt up w/ assist 1 and walker.  Walked with PT today w/ encouragement from family.    []: Ex, pt incontinent of urine mixed in stool.     [Skin/Wounds]: Pt has generalized jaundice. Blanching redness noted in pt's perineum.  Scrotal edema also noted.           Shift changes: Pt. Had increased confusion and SOB.  Noted RLL " and RUL coarse crackles.  Team notified, and STAT CXR ordered.  Pt O2 saturations have not changed.  9-6-99% on RA.  Wife at bedside and spoke with team this morning.  Plan:  Plan for possible discharge to TCU placement once medically stable.

## 2019-02-15 NOTE — PROGRESS NOTES
"SPIRITUAL HEALTH SERVICES  SPIRITUAL ASSESSMENT Progress Note (Palliative Focus)  [facility] ([place]) 5A    REFERRAL SOURCE: Palliative care initial spiritual assessment.    Visit with patient Mono Varghese and wife Yisel at bedside. Both are hopeful for a return home for Mono. Yisel nellie spiritually; Mnoo said his spirituality is not as important as Yisel's, but still a support in his life. Yisel said,and Mono affirmed, that Mono will be \"ready for hospice when his liver number is 20\". In the meantime, he is interested in continuing \"lactulose, which he mostly hates\".     Plan: I will follow for spiritual support while Palliative Care is consulted.    Justina Pham  Palliative   Pager [phone]  [facility] Inpatient Team Consult pager [phone] (M-F 8-4:30)  After-hours Answering Service [phone]    "

## 2019-02-15 NOTE — PROGRESS NOTES
"Social Work Services Progress Note    Hospital Day: 4  Collaborated with:  Primary team Piedad Davis, pt's spouse Yisel    Data:  Pt is a 48-year-old male admitted with ascites, encephalopathy, pleural effusion and history concerning for GI bleed. TCU is recommended at discharge.     Intervention:  Met with pt and pt's spouse at bedside. Pt's spouse clarified that she is no longer interested in hospice care and would like to pursue TCU placement for pt for \"2 weeks\" while they await PCA services to be set-up in the home. Pt's spouse does not want pt to return to [facility]. She requests referrals to the same places that were referred to during previous hospitalization, with except of [facility] (will not accept pts with paracentesis), and [facility] (declined d/t previous experience with pt).     Faxed referrals to:  1) [facility] (ph [phone])   2) [facility] (ph [phone])   3) [facility] (ph [phone] )- no beds available  4) [facility] (ph [phone])- declined, no reason given  5) [facility] (ph [phone])   6) [facility] (ph [phone])  7) [facility] (ph [phone])   8) [facility] (ph [phone])- declined d/t confusion, fall risk, \"overall medical status,\" feel appropriate for LTC  9) [facility] (ph [phone])  10) [facility] (ph [phone])    Assessment:  Pt's spouse requests TCU placement    Plan:    Anticipated Disposition:  Facility:  TCU    Barriers to d/c plan:  Placement    Follow Up:  SW to follow for discharge planning    FRANKLYN Felix, [facility]  5A Unit   Pager [phone]  Phone [phone]            "

## 2019-02-15 NOTE — PLAN OF CARE
Discharge Planner PT   Patient plan for discharge: patient wants to return home; spouse requesting TCU  Current status: min assist needed for bed mobility from flat bed; CGA for sit to/from stand from EOB with WW; min assist for sit to stand from low surface heights of W/C & toilet; CGA to amb with WW on level 300 ft  Barriers to return to prior living situation: medical status; assist needed for mobility; stairs within the home  Recommendations for discharge: TCU  Rationale for recommendations: will benefit from continued skilled PT intervention to address mobility deficits, improve strength & activity tolerance       Entered by: Brook Issa 02/15/2019 3:46 PM

## 2019-02-15 NOTE — PLAN OF CARE
"/63 (BP Location: Left arm)   Pulse 76   Temp 97.3  F (36.3  C) (Oral)   Resp 16   Ht 1.676 m (5' 6\")   Wt 83.4 kg (183 lb 12.8 oz)   SpO2 97%   BMI 29.67 kg/m      Reason for admission: Encephalopathy and SOB.  Vitals: VSS.  Activity: Up A1 with walker.  Pain: Denies.  Neuro: Oriented to self.   Cardiac: WDL.  Respiratory: WDL.  GI/: Incontinent of bowel and bladder.  No stools this shift and very minimal urinary output.  Bladder scan at 0630 was 70cc.  Diet: Regular.   Lines: PIV SL.  Skin/Wounds: WDL.   Plan: Discussing discharge at TCU vs. at home with PCA.      Continue to monitor and follow POC    Kwesi Mejía RN on 2/15/2019 at 5:57 AM         "

## 2019-02-16 NOTE — PLAN OF CARE
Pt oriented to self. Slow to respond, forgetful and confused at times. Left PIV SL. Breathing comfortably on RA. Incontinent of B&B. Large loose BM's x 5 this shift.  BG's AC and HS, stable. Able to eat and swallow pills. Bed alarm on. Continue with POC

## 2019-02-16 NOTE — PLAN OF CARE
Pt admitted with SOB and ascites. A and disoriented to time, forgetful. Slow to respond, responses are appropriate. [place] score of 0. VSS on RA. No complaints of pain or nausea. No BM this shift, incontinence. No urine occurences this shift. PIV SL. BG's stable, 117 at 2am. Pt slept most of shift, awakenings minimized, able to make needs known. Will continue with POC.

## 2019-02-16 NOTE — PLAN OF CARE
Pt disoriented to time/situation, flat affect but cooperative. VSS on RA. Up AX1 to bedside commode. Incontinent of stool. Wife at bedside. 2gm NA diet with 1500mL FR, fair appetite. BG checked before meals/bedtime, sliding scale insulin for coverage. Will discharge to TCU once placement is found, several referrals sent. Bed alarm on for safety. Will continue POC.

## 2019-02-16 NOTE — PROGRESS NOTES
General acute hospital, [facility] Progress Note - Hospitalist Service, Piedad 4       Date of Admission:  2/12/2019    Assessment & Plan   Mono Varghese is a 49yo M with a PMHx of ESLD with HE, rectal and esophageal varices, PVT (not on AC), and L parietal astrocytoma s/p resection who was admitted on 2/12/2019 with ascites, encephalopathy, hepatic hydrothorax, now progressing with physical therapy, no longer encephalopathic, waiting for TCU placement.    EtOH Cirrhosis:   MELD of 16. Last EGD 5/2018 and last sigmoidoscopy 10/2018. Complications include hepatic encephalopathy, refractory ascites, rectal and esophageal varices s/p banding, thrombocytopenia. Paracentesis completed on 2/14/2019 (negative for SBP)      -Rifaximin  -TID lactulose  -Hydroxyzine for itching  -[place] protocol  -Lasix/spironolactone    Transudative Pleural effusion   Thoracentesis performed, transudative fluid based on Lights criteria. Effusion likely secondary to significant ascites given R-sided location and SOB associated with recent ascites accumulation. Pneumonia unlikely given lack of fevers, minimal leukocytosis from baseline, negative strep pneumo Ag, procal wnl.     Hx GIB  Reported history of melena/BRB per rectum however not apparent on rectal exam on admission and patient is currently hemodynamically stable. Patient at risk for bleed given history of varices. Typed and crossed. 2 large bore IVs in place. GI following.   - Omeprazole 40mg 2x daily    JOSEF: Cr decreased from 1.53 on admission to 1.36. Baseline 1.0-1.1.    Chronic:  Hx seizures 2/2 excision of astrocytoma: Continue PTA Keppra 750mg 3x daily  Mood Disorder: Continue Mirtazapine  Peripheral Neuropathy: Continue Gabapentin  Malnutrition:  Continue PTA folic acid, methylphenidate 5mg BID (per discontinue from recent TCU stay), thiamine, Calcium-D3, Iron, multi vit  Dry skin: Continue PTA ciclopirox  Hypothyroid: Continue levothyroxine  88mcg daily, follow up TSH  HLD: pravastatin apears to have been stopped during recent TCU stay  T2DM: ISS, hypoglycemia protocol  LENA: clarify if uses CPAP when possible    Diet: NPO for Medical/Clinical Reasons Except for: Meds    Fluids: Albumin challenge  DVT Prophylaxis: SCDs  Munoz Catheter: not present  Code Status: DNR/DNI  Difficult to discuss with patient, he did endorse this code status, but unclear if he understood.  However this is in line with prior code status     Disposition Plan   Expected discharge: 4 - 7 days, recommended to prior living arrangement once ascites drained, mental status at baseline.     Entered: Tip Jiménez 02/16/2019, 11:26 AM     The patient's care was discussed with the Attending Physician, Dr. Morris and Resident Physician, Dr. Little.     Tip Jiménez  Hospitalist Service, [address], [place]  Pager: [phone]  Please see sticky note for cross cover information    Resident/Fellow Attestation   I, Bradly Little, was present with the medical student who participated in the service and in the documentation of the note.  I have verified the history and personally performed the physical exam and medical decision making.  I agree with the assessment and plan of care as documented in the note.        Bradly Little MD  PGY3  Date of Service (when I saw the patient): 02/16/19    ______________________________________________________________________    Interval History    No significant events overnight. Team talked to wife over the phone. Plan to discharge to TCU.     Data reviewed today: I reviewed all medications, new labs and imaging results over the last 24 hours.    Physical Exam   Vital Signs: Temp: 96.4  F (35.8  C) Temp src: Oral BP: 117/78 Pulse: 61 Heart Rate: 63 Resp: 16 SpO2: 94 % O2 Device: None (Room air)    Weight: 183 lbs 12.8 oz  Constitutional: Cachectic. Minimally verbal. Ill appearing.   Respiratory: Decreased  breath sounds on right  Cardiovascular:  RRR  GI:   Abdomen enlarged. Non-tender to palpation.  Skin: normal skin color, texture, turgor  Musculoskeletal: No edema bilateral lower extremities.   Neurologic: Alert. Oriented only to self. Beats of asterixis bilaterally.

## 2019-02-16 NOTE — PROGRESS NOTES
"Social Work Services Progress Note    Hospital Day: 5  Date of Initial Social Work Evaluation:  2/14/19  Collaborated with:  Pt, pt's wife    Data:  Per chart review, Mono Varghese is a 49yo M with a PMHx of ESLD with HE, rectal and esophageal varices, PVT (not on AC), and L parietal astrocytoma s/p resection who was admitted on 2/12/2019 with ascites, encephalopathy, hepatic hydrothorax, now progressing with physical therapy, no longer encephalopathic, waiting for TCU placement.    Intervention:  Pt has been recommended to a TCU facility upon discharge. He has a CADI waiver through [facility]. His CM is Jared Jones [phone]. They are currently in the process of getting a stair lift in place at home as well as increased PCA hours. Pt's wife and mother in law provide cares to the pt in the home and would like for him to participate in PT to increase his strength before returning home.     1) [facility] (ph [phone]) Mamie in admissions states the pt looks appropriate for their facility, but they will not be able to accept unti l Monday at soonest. They encouraged SW to check back with them on Monday.     2) [facility] (ph [phone]) Left VM    3) [facility] (ph [phone] )- no beds available    4) [facility] (ph [phone])- declined, no reason given    5) [facility] (ph [phone]) No weekend admissions    6) [facility] (ph [phone]) No weekend admissions dept.    7) [facility] (ph [phone])     8) [facility] (ph [phone])- declined d/t confusion, fall risk, \"overall medical status,\" feel appropriate for LTC    9) [facility] (ph [phone])    10) [facility] (ph [phone]) No beds available at this time. They advised to check back Monday.    11. [facility] [phone] Fax: [phone] Left a VM with their admissions dept and faxed a referral.    Assessment:  SW to continue to assist with DC " planning    Plan:    Anticipated Disposition:  Facility:  TCU    Barriers to d/c plan:  Bed availability    Follow Up:  SW to assist with discharge planning.    LINDA Aponte  [facility] Weekend   4A, 4C, 4E, 5A and 5B; pager [phone]  6A, 6B, 6C and 6D; pager [phone]  7A, 7B, 7C, 7D and 5C; pager [phone]

## 2019-02-17 NOTE — PLAN OF CARE
AVSS, Oriented x 3, slow response regarding situation. Abdomen distended, no request for pain med. Wife here late am, he ate food from home. Incontinent of stool and urine before also up to commode. Walked in leahy with wife and walker, tolerated well. Continues on Lactulose.

## 2019-02-17 NOTE — PLAN OF CARE
Pt admitted for SOB and ascites. Alert & disoriented to time and situation, slow to respond, flat affect. VSS on RA. No complaints of pain or nausea. Tolerating regular diet with fair appetite. Continued 1500 ml FR, 340 intake today. Incontinent of stool, 2 loose BM's this shift. [place] score of 0, no prn lactulose given. Distended abdomen. Incontinent of urination, unable to measure with mixed stool occurrences. Complained of itchy arms, hydrocortinsone cream applied. Up with assist of 1 and walker. BG's stable. Will continue with POC and discharge planning to TCU.

## 2019-02-17 NOTE — PROGRESS NOTES
Social Work Services Progress Note    Hospital Day: 6  Date of Initial Social Work Evaluation:  2/14/19  Collaborated with:  TCU facilities, pt, pt's wife, pt's sister and brother in law    Data:  Per chart review, Mono Varghese is a 49yo M with a PMHx of ESLD with HE, rectal and esophageal varices, PVT (not on AC), and L parietal astrocytoma s/p resection who was admitted on 2/12/2019 with ascites, encephalopathy, hepatic hydrothorax, now progressing with physical therapy, no longer encephalopathic, waiting for TCU placement.    Intervention:  Referrals have been made to several TCU facilities. SW received a call back from [facility] who states the received the referral and are forwarding to Anup, admission worker at [facility]. She encouraged us to check back on Monday.    So far it sounds like [facility] are potential placements for the pt and would likely have beds open on Monday.     I updated the family with this information and stressed that is wasn't an official acceptance and that we would hear more on Monday.     Discussion about transportation. Pt's family will consider their options but will likely request the pt be transported via HE wc or stretcher vs family as pt's insurance will likely cover the cost of this.     Assessment:  SW to continue to assist with DC planning.    Plan:    Anticipated Disposition:  Facility:  TCU    Barriers to d/c plan:  Bed availability, medical readiness    Follow Up:  SW to connect with referrals to TCU facilities on Monday to inquire about bed availability.    LINDA Aponte  [facility] Weekend   4A, 4C, 4E, 5A and 5B; pager [phone]  6A, 6B, 6C and 6D; pager [phone]  7A, 7B, 7C, 7D and 5C; pager [phone]

## 2019-02-17 NOTE — PROGRESS NOTES
[facility] Progress Note - Hospitalist Service, Piedad Davis       Date of Admission:  2/12/2019    Assessment & Plan   Mono Varghese is a 49yo M with a PMHx of ESLD with HE, rectal and esophageal varices, PVT (not on AC), and L parietal astrocytoma s/p resection who was admitted on 2/12/2019 with ascites, encephalopathy, hepatic hydrothorax, now progressing with physical therapy, no longer encephalopathic, waiting for TCU placement.    Changes today: Hold furosemide/spironolactone given elevated creatinine. Monitor BP, labs    EtOH Cirrhosis:   MELD of 16. Last EGD 5/2018 and last sigmoidoscopy 10/2018. Complications include hepatic encephalopathy, refractory ascites, rectal and esophageal varices s/p banding, thrombocytopenia. Paracentesis completed on 2/14/2019 (negative for SBP)      -Rifaximin  -TID lactulose  -Hydroxyzine for itching  -[place] protocol  -Holding lasix/spironolactone given elevated creatinine    Transudative Pleural effusion   Thoracentesis performed, transudative fluid based on Lights criteria. Effusion likely secondary to significant ascites given R-sided location and SOB associated with recent ascites accumulation. Pneumonia unlikely given lack of fevers, minimal leukocytosis from baseline, negative strep pneumo Ag, procal wnl.     Hx GIB  Reported history of melena/BRB per rectum however not apparent on rectal exam on admission and patient is currently hemodynamically stable. Patient at risk for bleed given history of varices. Typed and crossed. 2 large bore IVs in place. GI following.   - Omeprazole 40mg 2x daily    JOSEF: Cr decreased from 1.53 on admission to 1.43. Baseline 1.0-1.1.    Chronic:  Hx seizures 2/2 excision of astrocytoma: Continue PTA Keppra 750mg 3x daily  Mood Disorder: Continue Mirtazapine  Peripheral Neuropathy: Continue Gabapentin  Malnutrition:  Continue PTA folic acid, methylphenidate 5mg BID (per discontinue from  recent TCU stay), thiamine, Calcium-D3, Iron, multi vit  Dry skin: Continue PTA ciclopirox  Hypothyroid: Continue levothyroxine 88mcg daily, follow up TSH  HLD: pravastatin apears to have been stopped during recent TCU stay  T2DM: ISS, hypoglycemia protocol  LENA: clarify if uses CPAP when possible    Diet: NPO for Medical/Clinical Reasons Except for: Meds    Fluids: Completed albumin challenge  DVT Prophylaxis: SCDs  Munoz Catheter: not present  Code Status: DNR/DNI  Difficult to discuss with patient, he did endorse this code status, but unclear if he understood.  However this is in line with prior code status     Disposition Plan   Expected discharge: 1-2 days, recommended to TCU once mental status at baseline, diuretic plan in place.     Entered: Fly Morris MD 02/17/2019, 9:58 AM       Fly Morris MD  Hospitalist Service, [address], [place]  Pager: [phone]  Please see sticky note for cross cover information    ______________________________________________________________________    Interval History    No significant events overnight. Reports he is breathing comfortably this morning. No fever/chills. No abdominal distension.     Data reviewed today: I reviewed all medications, new labs and imaging results over the last 24 hours.    Physical Exam   Vital Signs: Temp: 96.1  F (35.6  C) Temp src: Oral BP: 113/72 Pulse: 66 Heart Rate: 73 Resp: 20 SpO2: 96 % O2 Device: None (Room air)    Weight: 183 lbs 12.8 oz  Constitutional: Cachectic. Minimally verbal.  Respiratory: Slightly decreased breath sounds on right lower lung fields  Cardiovascular:  RRR  GI:   Abdomen enlarged. Non-tender to palpation.  Skin: normal skin color, texture, turgor  Musculoskeletal: No edema bilateral lower extremities.   Neurologic: Alert. Oriented only to self. Beats of asterixis bilaterally.

## 2019-02-17 NOTE — DISCHARGE SUMMARY
[facility], [place]  Hospitalist Discharge Summary       Date of Admission:  2/12/2019  Date of Discharge:  2/19/19  Discharging Provider: Bradly Little MD  Discharge Team: Hospitalist Service, Piedad 4    Discharge Diagnoses   Hepatic hydrothorax  Acute hypoxic respiratory failure, resolved  Alcoholic cirrhosis with hepatic encephalopathy, ascites  Acute kidney injury  History of seizures and astrocytoma  History of GI bleed  Malnutrition  Hypothyroidism  Peripheral neuropathy  Mood disorder     Follow-ups Needed After Discharge   Follow-up Appointments     Follow Up and recommended labs and tests      Follow up with primary care provider in 2 weeks after discharge for addressing your labs, furosemide/spironolactone dose, and assessing your breathing. Continue paracentesis every other week.             Unresulted Labs Ordered in the Past 30 Days of this Admission     No orders found from 12/14/2018 to 2/13/2019.      These results will be followed up by Dr. Morris    [facility] Course   Mr Varghese was admitted with difficulty breathing, hypoxia, encephalopathy, and ascites. X-ray showed large right pleural effusion. Paracentesis showed no evidence of SBP. Thoracentesis removed 1L fluid and showed transudative effusion. 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 Antibiotics were stopped. His breathing improved. Upon discharge, he was at his baseline mental status on home dose lactulose and rifaximin. He will continue to need outpatient paracentesis weekly to biweekly. Furosemide and spironolactone were initially held due to JOSEF, then restarted prior to discharge. MIdodrine was also started to augment renal perfusion. These can be adjusted as outpatient.    Palliative care was consulted. Patient made it clear that he did not want hospice prior to discharge to TCU.     Consultations This Hospital Stay   PHYSICAL THERAPY ADULT IP CONSULT  OCCUPATIONAL THERAPY  ADULT IP CONSULT  PALLIATIVE CARE ADULT IP CONSULT    Code Status   DNR/DNI    Time Spent on this Encounter   I, Murtaza Little, personally saw the patient today and spent greater than 30 minutes discharging this patient.       Bradly Little MD  [facility], [place]  ______________________________________________________________________    Physical Exam   Vital Signs: Temp: 95.7  F (35.4  C) Temp src: Oral BP: 103/63 Pulse: 71 Heart Rate: 64 Resp: 16 SpO2: 98 % O2 Device: None (Room air)    Weight: 148 lbs 0 oz  Constitutional: Cachectic. Minimally verbal.  Respiratory: Slightly decreased breath sounds on right lower lung fields  Cardiovascular:  RRR  GI:   Abdomen enlarged. Non-tender to palpation.  Skin: normal skin color, texture, turgor  Musculoskeletal: No edema bilateral lower extremities.   Neurologic: Alert. Oriented only to self. Beats of asterixis bilaterally.         Primary Care Physician   [place]    Discharge Disposition   Discharged to short-term care facility  Condition at discharge: Stable    Significant Results and Procedures   Most Recent 3 CBC's:  Recent Labs   Lab Test 02/14/19  0625 02/13/19  1549 02/13/19  0635 02/12/19  1756   WBC 2.1*  --  1.9* 2.5*   HGB 10.4* 10.1* 9.2* 10.9*   *  --  102* 98   PLT 34* 30* 35* 43*     Most Recent 3 BMP's:  Recent Labs   Lab Test 02/19/19  0615 02/18/19  2110 02/18/19  0935 02/17/19  0842 02/16/19  0737     --  137 137 139   POTASSIUM 3.5 4.1 3.2* 3.5 3.4   CHLORIDE 109  --   --  111* 113*   CO2 17*  --   --  19* 18*   BUN 23  --   --  26 27   CR 1.34*  --  1.45* 1.43* 1.36*   ANIONGAP 11  --   --  7 9   UCHE 8.3*  --   --  8.2* 8.4*   *  --   --  110* 104*     Most Recent 2 LFT's:  Recent Labs   Lab Test 02/19/19  0615 02/18/19  0935 02/17/19  0842   AST 51* 51* 52*   ALT  --  38 32   ALKPHOS 337* 357* 326*   BILITOTAL 1.1 1.2 1.2     Most Recent 3 INR's:  Recent Labs   Lab Test  02/19/19  0615 02/18/19  0935 02/16/19  0552   INR 1.67* 1.56* 1.68*   ,   Results for orders placed or performed during the hospital encounter of 02/12/19   XR Chest Port 1 View    Narrative    XR CHEST PORT 1 VW  2/12/2019 5:09 PM     HISTORY:  soa    COMPARISON: Film dated 11/26/2018    FINDINGS:  Right Port-A-Cath is in place. There is a moderate-large  right pleural effusion which is new since 11/26/2018. There is  associated right basilar infiltrate/atelectasis. Left lung is clear.      Impression    IMPRESSION: Moderate-large right pleural effusion. This is new since  11/26/2018.    TESFAYE CAZARES MD   IR Thoracentesis    Narrative    Procedure: 2/14/2019.  1. Thoracentesis    History: Patient with history of cirrhosis and right pleural effusion,  here for therapy. Diagnostic ultrasound guided thoracentesis.    Staff: Stan Lua M.D.    Fellow: Devendra Naranjo M.D.    Medications: 10 mL 1% lidocaine.    Nursing: Throughout the procedure the patient's vital signs and oxygen  saturations were continuously monitored by nursing staff under my  supervision, and remained stable.    Fluoroscopy time: None.    IV contrast: None.    Consent: Informed written and verbal consent was obtained.    Procedure/Findings:  The patient was placed in the slightly left  lateral decubitus position on the gurney, and limited ultrasound  evaluation of the right hemithorax revealed a large simple appearing  right pleural effusion. The area overlying  the right lateral  hemithorax was prepped and draped in usual sterile fashion. Under  ultrasound guidance, the area overlying the effusion was anesthetized  to the pleura with 1% lidocaine. Under ultrasound guidance a 5 Wolof,  10 cm straight Yueh needle/catheter was advanced into the fluid  collection, with initial return of clear yellow fluid. The catheter  was advanced off the needle into the pleural space and the needle was  removed. 120 cc of clear light of fluid was aspirated and  delivered to  pathology. Extension tubing was then connected to the catheter and  vacuum drainage. A total of 1000 cc' s of fluid was aspirated.  Repeat  ultrasound revealed moderate amount of remaining fluid.  The catheter  was removed with the patient performing a Valsalva maneuver.  The site  was cleansed and dressed. Small hematoma just under the skin was noted  after the procedure and resolved with compression. Hemostasis was  obtained. Postprocedural scan demonstrated no immediate complication.  The patient tolerated the procedure well.    Estimate blood loss: Less than 3 mL    Specimens:  Clear yellow right pleural fluid..      Impression    Impression: Ultrasound guided right thoracentesis. Total of 1000 cc of  clear yellow pleural fluid drained. 120 cc of which was sent to  laboratory.    Plan: Postoperative care as ordered.   XR Chest 2 Views    Narrative    XR CHEST 2 VW  2/15/2019 4:35 PM      HISTORY: new dyspnea    COMPARISON: 2/12/2019    FINDINGS: Right IJ Port-A-Cath tip projecting over the right atrium,  unchanged. Normal heart size. Partial obscuration of the right cardiac  silhouette, similar to prior examination. Pulmonary vascular showed  within normal limits. The interstitial pulmonary opacities are  decreased compared to prior examination. Slightly decreased moderate  to large right pleural effusion and compressive atelectasis. Upper  abdomen is unremarkable.      Impression    IMPRESSION:   1. Slightly decreased moderate to large right pleural effusion and  compressive atelectasis.  2. Decreased pulmonary interstitial opacities, likely resolving  pulmonary edema.    I have personally reviewed the examination and initial interpretation  and I agree with the findings.    MIRELLA DURÁN MD     *Note: Due to a large number of results and/or encounters for the requested time period, some results have not been displayed. A complete set of results can be found in Results Review.       Discharge  Orders      Medication Therapy Management Referral      General info for SNF    Length of Stay Estimate: Short Term Care: Estimated # of Days <30  Condition at Discharge: Improving  Level of care:skilled   Rehabilitation Potential: Good  Admission H&P remains valid and up-to-date: Yes  Recent Chemotherapy: N/A  Use Nursing Home Standing Orders: Yes     Mantoux instructions    Give two-step Mantoux (PPD) Per Facility Policy Yes     Reason for your hospital stay    You were admitted for increased fluid on your lungs, likely due to hepatic hydrothorax (movement of the fluid from the abdomen to the lungs). This was drained. It is possible that the fluid comes back in the future, and if it does you may need the fluid drained again.     Follow Up and recommended labs and tests    Follow up with primary care provider in 2 weeks after discharge for addressing your labs, furosemide/spironolactone dose, and assessing your breathing. Continue weekly paracentesis.     Activity - Up ad tj     DNR/DNI     Physical Therapy Adult Consult    Evaluate and treat as clinically indicated.    Reason:  Physical deconditioning     Occupational Therapy Adult Consult    Evaluate and treat as clinically indicated.    Reason:  Physical deconditioning     Advance Diet as Tolerated    Follow this diet upon discharge:      Fluid restriction 1500 ML FLUID      Combination Diet Regular Diet Adult; 2 gm NA Diet     Discharge Medications   Current Discharge Medication List      START taking these medications    Details   hydrocortisone (CORTAID) 0.5 % external cream Apply topically 2 times daily To itchy areas on arms    Associated Diagnoses: Itching      hydrOXYzine (ATARAX) 10 MG tablet Take 1 tablet (10 mg) by mouth every 8 hours as needed for itching or anxiety    Associated Diagnoses: Itching      melatonin 1 MG TABS tablet Take 1 tablet (1 mg) by mouth nightly as needed for sleep    Associated Diagnoses: Primary insomnia      midodrine  (PROAMATINE) 5 MG tablet Take 3 tablets (15 mg) by mouth 3 times daily (with meals)  Qty: 270 tablet, Refills: 0    Associated Diagnoses: Alcoholic cirrhosis of liver with ascites (H)         CONTINUE these medications which have CHANGED    Details   gabapentin (NEURONTIN) 100 MG capsule Take 2 capsules (200 mg) by mouth 3 times daily Take up to 300mg three times daily for agitation/anxiety  Qty: 90 capsule, Refills: 2    Associated Diagnoses: Mild episode of recurrent major depressive disorder (H)      lactulose (CHRONULAC) 10 GM/15ML solution Take 60 mLs by mouth 3 times daily    Associated Diagnoses: Hepatic encephalopathy (H)      methylphenidate (RITALIN) 10 MG tablet Take 0.5 tablets (5 mg) by mouth 2 times daily (take at 8AM and 2pm)  Qty: 60 tablet, Refills: 0    Associated Diagnoses: Attention deficit hyperactivity disorder (ADHD), unspecified ADHD type      mirtazapine (REMERON) 30 MG tablet Take 1 tablet (30 mg) by mouth At Bedtime  Qty: 30 tablet, Refills: 0    Associated Diagnoses: Major depressive disorder with single episode, in partial remission (H)      traZODone (DESYREL) 50 MG tablet Take 1 tablet (50 mg) by mouth At Bedtime  Qty: 30 tablet, Refills: 2    Associated Diagnoses: Primary insomnia         CONTINUE these medications which have NOT CHANGED    Details   Calcium Carb-Cholecalciferol (CALCIUM 500 +D) 500-400 MG-UNIT TABS Take 500 mg by mouth daily  Qty: 90 tablet, Refills: 1    Associated Diagnoses: Malnutrition (H)      ciclopirox (LOPROX) 0.77 % cream Apply topically 2 times daily To feet and toenails.  Qty: 90 g, Refills: 4    Associated Diagnoses: Tinea pedis of both feet      ferrous sulfate (IRON) 325 (65 FE) MG tablet Take 1 tablet (325 mg) by mouth 2 times daily  Qty: 200 tablet, Refills: 3    Associated Diagnoses: Other iron deficiency anemia      folic acid (FOLVITE) 1 MG tablet Take 1 tablet (1 mg) by mouth daily  Qty: 90 tablet, Refills: 3    Associated Diagnoses: Alcoholic  cirrhosis of liver with ascites (H)      furosemide (LASIX) 20 MG tablet Take 1 tablet (20 mg) by mouth daily  Qty: 30 tablet, Refills: 3    Associated Diagnoses: Other ascites      levETIRAcetam (KEPPRA) 750 MG tablet Take 1 tablet (750 mg) by mouth 3 times daily  Qty: 93 tablet, Refills: 11    Associated Diagnoses: Hepatic encephalopathy (H)      levothyroxine (SYNTHROID/LEVOTHROID) 88 MCG tablet Take 1 tablet (88 mcg) by mouth daily  Qty: 90 tablet, Refills: 1    Associated Diagnoses: Hypothyroidism, unspecified type      multivitamin, therapeutic with minerals (THERA-VIT-M) TABS Take 1 tablet by mouth 2 times daily      omeprazole (PRILOSEC) 20 MG CR capsule Take 2 capsules (40 mg) by mouth 2 times daily  Qty: 360 capsule, Refills: 3    Associated Diagnoses: Gastroesophageal reflux disease without esophagitis      pravastatin (PRAVACHOL) 80 MG tablet Take 1 tablet (80 mg) by mouth daily  Qty: 90 tablet, Refills: 3    Associated Diagnoses: High cholesterol      spironolactone (ALDACTONE) 25 MG tablet Take 2 tablets (50 mg) by mouth daily  Qty: 180 tablet, Refills: 0    Associated Diagnoses: Other ascites      thiamine (VITAMIN B-1) 100 MG tablet Take 1 tablet (100 mg) by mouth daily  Qty: 100 tablet, Refills: 1    Associated Diagnoses: Alcoholic cirrhosis of liver with ascites (H)      Vitamin D, Cholecalciferol, 1000 units TABS Take 1 tablet by mouth daily      XIFAXAN 550 MG TABS tablet TAKE ONE TABLET BY MOUTH TWICE DAILY  Qty: 60 tablet, Refills: 11    Comments: Please consider 90 day supplies to promote better adherence  Associated Diagnoses: Hepatic encephalopathy (H)      blood glucose monitoring (ONETOUCH ULTRA) test strip Use to test blood sugars 4 times daily as needed or as directed.  Qty: 400 each, Refills: 1    Associated Diagnoses: Diabetes mellitus, type 2 (H)      ONE TOUCH LANCETS MISC by In Vitro route 4 times daily as needed  Qty: 100 each, Refills: prn    Comments: As of January 1 pt's  "insurance will only cover onetouch or accu check.  Patient needs a  new glucometer (one touch), too  Associated Diagnoses: Type II or unspecified type diabetes mellitus without mention of complication, not stated as uncontrolled      order for DME Equipment being ordered: manual wheelchair and cushion    [employer] light weight Sx5 18x18, 17.5 inch floor height and a 2 inch 18x18 inch cushion    length of need : lifetime        Patient's height : 5/10\", weight : 172 pounds  Qty: 1 Device, Refills: 0    Associated Diagnoses: Falls frequently; At high risk for falls; Imbalance; Limited mobility         STOP taking these medications       ibuprofen (ADVIL/MOTRIN) 200 MG tablet Comments:   Reason for Stopping:         vitamin B-12 (CYANOCOBALAMIN) 1000 MCG tablet Comments:   Reason for Stopping:             Allergies   Allergies   Allergen Reactions     Silver Nitrate      Tegaderm is ok if skip prep is used prior to application.     Latex Itching and Rash     No Clinical Screening - See Comments      Coban and Surgilast cause itching     Tegaderm Transparent Dressing (Informational Only)      "

## 2019-02-18 NOTE — PROGRESS NOTES
"Social Work Services Progress Note     Hospital Day: 7  Collaborated with:  Primary team Piedad Davis, TCU facilities     Data:  Pt is a 48-year-old male admitted with ascites, encephalopathy, pleural effusion and history concerning for GI bleed. TCU is recommended at discharge.      Intervention:  Following up on TCU referrals. No accepting facility at this time. Several referrals pending.      Faxed referrals to:  1) [facility] (ph [phone])- no beds available at the moment, but will keep \"in active referral file\"  2) [facility] (ph [phone])- left  2/18    3) [facility] (ph [phone] )- no beds available  4) [facility] (ph [phone])- declined, no reason given  5) [facility] (ph [phone])- - no beds until Thursday, will review for clinical appropriateness  6) [facility] (ph [phone])- left  2/18  7) [facility] (ph [phone])- declined, \"does not meet criteria,\" high acuity, do not accept pts who require paracentesis  8) [facility] (ph [phone])- declined d/t confusion, fall risk, \"overall medical status,\" feel appropriate for LTC  9) [facility] (ph [phone])- left  2/18  10) [facility] (ph [phone])- left  2/18  11) [facility] (liaison Anup, ph [phone])- no beds until potentially Thursday     Assessment:  Pt's spouse requests TCU placement     Plan:    Anticipated Disposition:  Facility:  TCU    Barriers to d/c plan:  Placement    Follow Up:  SW to follow for discharge planning     FRANKLYN Felix, [facility]  5A Unit   Pager [phone]  Phone [phone]      "

## 2019-02-18 NOTE — PLAN OF CARE
Pt alert and oriented to self, place and situation. Disoriented to date/time. PIV SL. Reg diet, fair appetite. Up to commode for 2 BM's this shift. Encouragement and reminders needed to get up to commode. Incontinent of urine. BG stable. Wife at bedside. Denies pain. Awaiting bed at TCU. Continue with POC.

## 2019-02-18 NOTE — PROGRESS NOTES
[facility], [facility] Progress Note - Hospitalist Service, Piedad 4       Date of Admission:  2/12/2019    Assessment & Plan   Mono Varghese is a 47yo M with a PMHx of ESLD with HE, rectal and esophageal varices, PVT (not on AC), and L parietal astrocytoma s/p resection who was admitted on 2/12/2019 with ascites, encephalopathy, hepatic hydrothorax, now progressing with physical therapy, no longer encephalopathic, waiting for TCU placement.    EtOH Cirrhosis:   JOSEF: Cr 1.0-1.1 at baseline, 1.53 on admission  MELD of 16. Last EGD 5/2018 and last sigmoidoscopy 10/2018. Complications include hepatic encephalopathy, refractory ascites, rectal and esophageal varices s/p banding, thrombocytopenia.   -Therapeutic/Diagnositc paracentesis on 2/14/2019 (negative for SBP)   -Therapeutic Paracentesis on 2/18/19 + albumin  -Continue to holding lasix/spironolactone given elevated creatinine  -Start midodrine 5mg TID for goal SBP >110mmHg to augment renal perfusion  -Rifaximin, TID lactulose, replete K>4.0  -Hydroxyzine for itching  -[place] protocol    Hepatic Hydrothorax  Thoracentesis on 2/14/19. Transudative fluid based on Lights criteria. Effusion likely secondary to significant ascites given R-sided location and SOB associated with recent ascites accumulation. Pneumonia unlikely given lack of fevers, minimal leukocytosis from baseline, negative strep pneumo Ag, procal wnl.     Hx GIB  Reported history of melena/BRB per rectum however not apparent on rectal exam on admission and patient is currently hemodynamically stable. Patient at risk for bleed given history of varices. Typed and crossed. 2 large bore IVs in place. GI following.   - Omeprazole 40mg 2x daily    Chronic:  Hx seizures 2/2 excision of astrocytoma: Continue PTA Keppra 750mg 3x daily  Mood Disorder: Continue Mirtazapine  Peripheral Neuropathy: Continue Gabapentin  Malnutrition:  Continue PTA folic acid, methylphenidate  5mg BID (per discontinue from recent TCU stay), thiamine, Calcium-D3, Iron, multi vit  Dry skin: Continue PTA ciclopirox  Hypothyroid: Continue levothyroxine 88mcg daily, follow up TSH  HLD: pravastatin apears to have been stopped during recent TCU stay  T2DM: ISS, hypoglycemia protocol  LENA: clarify if uses CPAP when possible    Diet: NPO for Medical/Clinical Reasons Except for: Meds    Fluids: Completed albumin challenge  DVT Prophylaxis: SCDs  Munoz Catheter: not present  Code Status: DNR/DNI  Difficult to discuss with patient, he did endorse this code status, but unclear if he understood.  However this is in line with prior code status     Disposition Plan   Expected discharge: 1-2 days, recommended to TCU once mental status at baseline, diuretic plan in place.     Entered: Bradly Little MD 02/18/2019, 1:03 PM       Bradly Little MD  Hospitalist Service, [address]  Pager: [phone]  Please see sticky note for cross cover information    ______________________________________________________________________    Interval History    No significant events overnight. Reports he is breathing comfortably this morning. No fever/chills.     Data reviewed today: I reviewed all medications, new labs and imaging results over the last 24 hours.    Physical Exam   Vital Signs: Temp: 95  F (35  C) Temp src: Axillary BP: 102/77 Pulse: 72 Heart Rate: 69 Resp: 16 SpO2: 96 % O2 Device: None (Room air)    Weight: 183 lbs 12.8 oz  Constitutional: Cachectic. Minimally verbal.  Respiratory: Slightly decreased breath sounds on right lower lung fields  Cardiovascular:  RRR  GI:   Abdomen enlarged. Non-tender to palpation.  Skin: normal skin color, texture, turgor  Musculoskeletal: No edema bilateral lower extremities.   Neurologic: Alert. Oriented only to self. Beats of asterixis bilaterally.

## 2019-02-18 NOTE — PROGRESS NOTES
Interventional Radiology Brief Post Procedure Note    Procedure: IR PARACENTESIS    Proceduralist: Chaparro Alvarado MD    Assistant: IR Fellow Physician, Santy Emerson MD and Radiology Resident Physician, Trevon Pearce MD    Time Out: Prior to the start of the procedure and with procedural staff participation, I verbally confirmed the patient s identity using two indicators, relevant allergies, that the procedure was appropriate and matched the consent or emergent situation, and that the correct equipment/implants were available. Immediately prior to starting the procedure I conducted the Time Out with the procedural staff and re-confirmed the patient s name, procedure, and site/side. (The Joint Commission universal protocol was followed.)  Yes    Medications   Medication Event Details Admin User Admin Time       Sedation: None. Local Anesthestic used    Findings: Clear yellow ascites.     Estimated Blood Loss: None    Fluoroscopy Time:  minute(s)    SPECIMENS: None    Complications: 1. None     Condition: Stable    Plan: Routine post procedural cares.       Comments: See dictated procedure note for full details.    Trevon Pearce MD

## 2019-02-18 NOTE — PLAN OF CARE
PT 5A: Attempted to see patient for PT this afternoon but going down for a procedure. Will re-attempt if schedule allows.

## 2019-02-18 NOTE — PROGRESS NOTES
"Palliative Care Inpatient Clinical Social Work Follow Up Visit:    Patient Information: Mono Varghese is a 48 year old male with ESLD c/b refractory ascites, hepatic encephalopathy, and esophageal varices, who was admitted for AMS and SOB    Reason for Palliative Care Consultation: Patient and family support     Visited With: Patient and Family member(s) Wife Yisel    Summary of Visit: I met with Mono and his wife Yisel today. Yisel had remembered me from Mono's previous hospitalization. She reported that Mono received additional PCA hours and will be able to come home again after his TCU placement. She also noted that they had a long talk together about \"when to draw the line\". Both her and Mono are not ready for hospice but feel they have a good idea of when it is time. She specifically noted that when Mono's meld score reaches 20.     Assessment: Mono is happy that he will be able to go back home and does feel that he and Yisel had had time to process when to discontinue care. He takes a while to respond to questions but if given time, he is able to follow conversation.      Relevant Symptoms/Concerns: Waiting for TCU placement. Yisel would like to get as close to home as possible.      Strengths: Yisel met with Mono's  and feels there is a good plan in place.    Goals: To rebuild Mono's strength at TCU and go home.      Clinical Social Work Interventions Utilized: Assessment of palliative specific issues, Facilitation of processing of thoughts/feelings and Psychoeducation    Coordinated With: ARMEN    Plan and Recommendations: I will not plan to follow up with Mono and family this hospitalization. Family appears to be well supported at this time. If that changes, I would be happy to continue to support them.     Judy ESTES, [facility]  Palliative Care Social Work Fellow  Pager: [phone]    [facility] Inpatient Team Consult pager [phone] (M-F 8-4:30)  After-hours " answering service [phone]

## 2019-02-18 NOTE — PLAN OF CARE
Status:  Pt admitted for SOB, Hepatic encephalopathy. H/o ESLD, esophageal varices, T2DM, Depression.  VS:  VSS, hypotensive this AM- 89/59. Re-check was 106/71.  Neuros: A&Ox3, Pt stated it was March, but knew it was  2019.  Slow, garbled speech. O'Kean stage 0- no prn lactulose given.  GI: Tolerating 2g Na diet, 1.5 L FR. +BS, passing flatus. 1 loose, incontinent stool overnight.   : Voiding spontaneously, incontinent.    IV:  PIV SL.   Activity: Up w/ heavy 2 assist and GB.   Pain: Denies, resting in between cares.   Respiratory: LS dim on the R side.   Skin: Intact. Blanchable redness to coccyx, barrier cream applied after enrique-cares.   Labs:  .  Plan of care:  Continue to monitor and follow POC. Awaiting bed placement at TCU.

## 2019-02-18 NOTE — CONSULTS
Patient is on IR schedule 2/18/2019 for a Paracentesis, therapeutic.   Labs WNL for procedure.   No NPO required. Consent will be done prior to procedure  Ascites,request pre discharge today to PCU   Please contact the IR charge RN at [phone] for estimated time of procedure.       Evelina Peacock IR RPA  [phone] Call pager  [phone] pager

## 2019-02-19 NOTE — PROGRESS NOTES
"Social Work Services Progress Note     Hospital Day: 8  Collaborated with:  Primary team Piedad Davis, TCU facilities, pt, pt's spouse     Data:  Pt is a 48-year-old male admitted with ascites, encephalopathy, pleural effusion and history concerning for GI bleed. TCU is recommended at discharge.      Intervention:  Following up on TCU referrals. No accepting facility at this time. Met with pt's spouse to provide update and provide her with list of additional facilities. Pt's spouse frustrated at facilities that have declined pt. Pt's wife upset that facilities close to home have declined pt, stating that she should not have to drive 40 miles to visit pt. Pt's spouse states \"if I can meet his care needs at home a facility should be able to.\" Pt's spouse agreeable to additional referrals to [facility] and [facility] in [place]. She plans to have her mother call facilities because she has worked at some in the past.     Faxed referrals to:  1) [facility] (ph [phone])- no beds available at the moment, but will keep \"in active referral file\"  [facility] [phone])- left VMs 2/18, 2/19    [facility]) [facility] ([facility] [phone] )- no beds available  4) [facility] (ph [phone])- declined, no reason given  5) [facility] (ph [phone])- no beds until Thursday, will review for clinical appropriateness- left VM for f/u on 2/19  6) [facility] (ph [phone])- no beds available  7) [facility] (ph [phone])- declined, \"does not meet criteria,\" high acuity, do not accept pts who require paracentesis  8) [facility] (ph [phone])- declined d/t confusion, fall risk, \"overall medical status,\" feel appropriate for LTC  9) [facility] (ph [phone])- left VMs 2/18, 2/19  10) [facility] (ph [phone])- declined, \"cannot meet needs\"  11) [facility] (ana [place], ph [phone])- no beds until potentially Thursday  12) [facility]- sent referral 2/19  13) [facility]- sent referral 2/19     Assessment:  Pursuing TCU placement     Plan:    Anticipated Disposition:  Facility:  TCU    Barriers to d/c plan:  Placement    Follow Up:  SW to follow for discharge planning     FRANKLYN Felix, LGSW  5A Unit   Pager [phone]  Phone [phone]    Addendum 1:09: Pt's spouse spoke with charge nurse and stated that she spoke with [facility] and that they have accepted pt for Thursday. Received VM from [facility] at 12:09pm today stating that they have beds available Thursday and Friday and will review pt if still looking for placement. Called and spoke with [facility] in admissions. [facility] has not yet clinically accepted pt and will now review pt for bed available on Thursday.     Addendum 1:39pm: Pt accepted to [facility] for today.

## 2019-02-19 NOTE — PROGRESS NOTES
[facility], [place]   Palliative Care Daily Progress Note          Palliative Care was consulted for decisional support and goals of care. At this time goals are clear and there is minimal symptom burden, so we will sign off. Please feel free to re-consult us if we can be helpful in the future. Thank you for the opportunity to be involved in the care of this patient.    TIESHA Jones CNP  Palliative Care Consult Team  Pager: [phone]    [facility] Inpatient Team Consult pager [phone] (M-F 8-4:30)  After-hours Answering Service [phone]   Palliative Clinic: [phone]

## 2019-02-19 NOTE — PLAN OF CARE
Physical Therapy Discharge Summary    Reason for therapy discharge:    Discharged to transitional care facility.    Progress towards therapy goal(s). See goals on Care Plan in [facility] electronic health record for goal details.  Goals not met.  Barriers to achieving goals:   discharge from facility.    Therapy recommendation(s):    Continued therapy is recommended.  Rationale/Recommendations:  to improve functional strength, activity tolerance and safety with mobility.

## 2019-02-19 NOTE — PLAN OF CARE
VSS on RA. Disorientated to time and forgetful. Scoring a 0 on the [place]. Pt given scheduled lactulose; has had 2 BM this shift; 1 episode of incontinence. Pt tolerating regular diet; denies nausea. Paracentesis done in IR today; 4.7L of fluid removed. Albumin given after. Port saline locked. Pt on a 1.5L fluid restriction. Up with Assist of 1 to the commode. Pt working with PT appropriately. Waiting for TCU placement. Pt doesn't call appropriately unless wife is at bedside. Bed alarm on for safety when wife not in room. Continue to monitor and follow POC.

## 2019-02-19 NOTE — PROGRESS NOTES
"Social Work Services Discharge Note      Patient Name:  Mono Varghese     Anticipated Discharge Date:  2/19/19    Discharge Disposition:   TCU:  [facility]   Ph [phone]  Fax [phone]    Following MD:  Facility assignment     Pre-Admission Screening (PAS) online form has been completed.  The Level of Care (LOC) is:  Determined  Confirmation Code is:  [id number]  Patient/caregiver informed of referral to [facility] for Pre-Admission Screening for skilled nursing facility (SNF) placement and to expect a phone call post discharge from SNF.     Additional Services/Equipment Arranged:  Pt's spouse to transport pt at 3pm. Offered to set up wheelchair van transportation although transportation company is scheduled out until this evening- pt's spouse has chosen to transport pt herself, stating she \"has no choice.\" Informed pt's spouse that she does have a choice and wheelchair transportation can be scheduled if she would prefer that. Pt's spouse declines and states she will transport pt.      Patient / Family response to discharge plan:  Pt and pt's spouse in agreement with plan. Pt accepted to private room with shared bathroom and no private room fee. Pt's spouse agreeable with this.     Persons notified of above discharge plan:  Pt, pt's spouse, ELANA Horn, primary team Arielle Whittington- admissions at [facility] (ph [phone])    Staff Discharge Instructions:  RN to call report to [phone].  SW to fax discharge orders and signed hard scripts for any controlled substances.  Please print a packet and send with patient.     CTS Handoff completed:  YES    FRANKLYN Felix, FRANKSW  5A Unit   Pager [phone]  Phone [phone]          "

## 2019-02-19 NOTE — PROGRESS NOTES
[facility], [facility] Progress Note - Hospitalist Service, Piedad 4       Date of Admission:  2/12/2019    Assessment & Plan   Mono Varghese is a 49yo M with a PMHx of ESLD with HE, rectal and esophageal varices, PVT (not on AC), and L parietal astrocytoma s/p resection who was admitted on 2/12/2019 with ascites, encephalopathy, hepatic hydrothorax, now progressing with physical therapy, no longer encephalopathic, waiting for TCU placement.    EtOH Cirrhosis:   JOSEF: Cr 1.0-1.1 at baseline, 1.53 on admission  MELD of 16. Last EGD 5/2018 and last sigmoidoscopy 10/2018. Complications include hepatic encephalopathy, refractory ascites, rectal and esophageal varices s/p banding, thrombocytopenia.   -Therapeutic/Diagnositc paracentesis on 2/14/2019 (negative for SBP)   -Therapeutic Paracentesis on 2/18/19 + albumin  -Resume holding lasix/spironolactone   -Contine midodrine 15mg TID for goal SBP >110mmHg to augment renal perfusion  -Rifaximin, TID lactulose, replete K>4.0  -Hydroxyzine for itching    Hepatic Hydrothorax  Thoracentesis on 2/14/19. Transudative fluid based on Lights criteria. Effusion likely secondary to significant ascites given R-sided location and SOB associated with recent ascites accumulation. Pneumonia unlikely given lack of fevers, minimal leukocytosis from baseline, negative strep pneumo Ag, procal wnl.     Hx GIB  Reported history of melena/BRB per rectum however not apparent on rectal exam on admission and patient is currently hemodynamically stable. Patient at risk for bleed given history of varices. Typed and crossed. 2 large bore IVs in place. GI following.   - Omeprazole 40mg 2x daily    Chronic:  Hx seizures 2/2 excision of astrocytoma: Continue PTA Keppra 750mg 3x daily  Mood Disorder: Continue Mirtazapine  Peripheral Neuropathy: Continue Gabapentin  Malnutrition:  Continue PTA folic acid, methylphenidate 5mg BID (per discontinue from recent TCU stay),  thiamine, Calcium-D3, Iron, multi vit  Dry skin: Continue PTA ciclopirox  Hypothyroid: Continue levothyroxine 88mcg daily, follow up TSH  HLD: pravastatin apears to have been stopped during recent TCU stay  T2DM: ISS, hypoglycemia protocol  LENA: clarify if uses CPAP when possible    Diet: NPO for Medical/Clinical Reasons Except for: Meds    Fluids: Completed albumin challenge  DVT Prophylaxis: SCDs  Munoz Catheter: not present  Code Status: DNR/DNI  Difficult to discuss with patient, he did endorse this code status, but unclear if he understood.  However this is in line with prior code status     Disposition Plan   Expected discharge: recommended to TCU when bed is available    Entered: Bradly Little MD 02/19/2019, 10:19 AM       Bradly Little MD  Hospitalist Service, [address], [place]  Pager: [phone]  Please see sticky note for cross cover information    ______________________________________________________________________    Interval History    No significant events overnight. Reports he is breathing comfortably this morning. No fever/chills.     Data reviewed today: I reviewed all medications, new labs and imaging results over the last 24 hours.    Physical Exam   Vital Signs: Temp: 96.8  F (36  C) Temp src: Oral BP: 107/67 Pulse: 71 Heart Rate: 74 Resp: 16 SpO2: 97 % O2 Device: None (Room air)    Weight: 148 lbs 0 oz  Constitutional: Cachectic. Minimally verbal.  Respiratory: Slightly decreased breath sounds on right lower lung fields  Cardiovascular:  RRR  GI:   Abdomen enlarged. Non-tender to palpation.  Skin: normal skin color, texture, turgor  Musculoskeletal: No edema bilateral lower extremities.   Neurologic: Alert. Oriented only to self. Beats of asterixis bilaterally.

## 2019-02-19 NOTE — PLAN OF CARE
Patient alert, disoriented to time and forgetful. Slow to respond denies pain, Vitals stable on room air. Scheduled lactulose given as ordered. Incontinent of stool and urine. Scoring 0 on [place]. Regular 2 gm NA diet. 1.5 FR. Denies nausea or vomiting. BG.s 204, 169. Paracentesis site clean dry and intact. Patient sleeping between cares. Bed alarm on. Will continue to monitor and follow POC. .

## 2019-02-19 NOTE — PLAN OF CARE
VSS of RA. Disorientated to time. Scoring a 0 on the [place]. Scheduled lactulose given this AM. Pt had 1 large incontinent BM. Tolerating regular diet; denies nausea or pain. Maintaned 1.5L FR. Pt up with SBA and walker. BG monitored before meals. Pt adequate for discharge to TCU. Wife to transfer pt to facility. Port de-accessed. Packet sent with pt. Report called and given to RN. Ritalin script faxed to facility. [facility] faxed discharge orders. Pt left unit at 1500.

## 2019-02-21 NOTE — PROGRESS NOTES
[facility]  PRIMARY CARE PROVIDER AND CLINIC:  [facility] [address] [facility] [address] / [facility] [address]  Chief Complaint   Patient presents with     Establish Care     [facility] Record Number:  [id number]  Place of Service where encounter took place:  [facility] ([facility] [[id number]]    HPI:    Mono Varghese is a 48 year old  (1970),admitted to the above facility from  [facility].  Hospital stay 2/12/19 through 2/19/19.  Admitted to this facility for  rehab, medical management and nursing care.        Hospital Course Per [facility] Discharge Summary 2/19/19:    Mr Varghese was admitted with difficulty breathing, hypoxia, encephalopathy, and ascites. X-ray showed large right pleural effusion. Paracentesis showed no evidence of SBP. Thoracentesis removed 1L fluid and showed transudative effusion. 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 Antibiotics were stopped. His breathing improved. Upon discharge, he was at his baseline mental status on home dose lactulose and rifaximin. He will continue to need outpatient paracentesis weekly to biweekly. Furosemide and spironolactone were initially held due to JOSEF, then restarted prior to discharge. MIdodrine was also started to augment renal perfusion. These can be adjusted as outpatient.     Palliative care was consulted. Patient made it clear that he did not want hospice prior to discharge to TCU.        HPI information obtained from: facility chart records, facility staff, patient report and [facility] chart review.    Current issues are:         Hepatic encephalopathy (H)  Alcoholic cirrhosis, unspecified whether ascites present (H)  Acute respiratory failure with hypoxia (H)  JOSEF (acute kidney injury) (H)  History of seizure  History of GI bleed  Malnutrition, unspecified type (H)  Hypothyroidism, unspecified type  Peripheral polyneuropathy  Moderate major depression  (H)  Mood disorder (H)  Attention deficit hyperactivity disorder (ADHD), unspecified ADHD type  Anxiety  Hyperlipidemia, unspecified hyperlipidemia type  Insomnia, unspecified type  LENA (obstructive sleep apnea)  Type 2 diabetes mellitus without complication, without long-term current use of insulin (H)  Gastroesophageal reflux disease, esophagitis presence not specified     Patient with above Dx/Hx, acute concerns with hepatic issues, previously resided in home with support of spouse and MIL, now at  TCU for therapies with goal of improving but if having status decline plan is to move home on hospice.    Today patient seen in room then again later with spouse after care conference, abdomen extended but not taught, numerous centesis scars, AOx1, poor historian, limited communication and cognition, ambulatory with assist only, positive pruritis particularly L upper arm with no known etiology, per spouse status is declining slowly, not sleeping well in unknown environment, overall acute on chronic concerns yet stable.     CODE STATUS/ADVANCE DIRECTIVES DISCUSSION:   DNR / DNI  Patient's living condition: lives in an assisted living facility    ALLERGIES:Silver nitrate; Latex; No clinical screening - see comments; and Tegaderm transparent dressing (informational only)  PAST MEDICAL HISTORY:  has a past medical history of ADHD, Alcoholic cirrhosis of liver with ascites (H) (06/17/2015), Ascites due to alcoholic cirrhosis (H), Chronic pain (8/1/2016), Depressive disorder (02/01/2001), Encephalopathy, hepatic (H) (7/29/2017), GERD (gastroesophageal reflux disease), GIB (gastrointestinal bleeding) (11/23/2015), 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 He also has no past medical history of Chronic infection.  PAST SURGICAL HISTORY:  has a past surgical history that includes orthopedic surgery; Optical tracking system craniotomy, excise tumor, combined (6/6/2013); Esophagoscopy, gastroscopy, duodenoscopy (EGD), combined (N/A, 11/23/2015); Sigmoidoscopy flexible (N/A, 11/24/2015); Colonoscopy (N/A, 11/27/2015); Esophagoscopy, gastroscopy, duodenoscopy (EGD), combined (N/A, 1/25/2017); Esophagoscopy, gastroscopy, duodenoscopy (EGD), combined (N/A, 3/30/2017); Esophagoscopy, gastroscopy, duodenoscopy (EGD), combined (N/A, 1/19/2018); Sigmoidoscopy flexible (N/A, 1/19/2018); Esophagoscopy, gastroscopy, duodenoscopy (EGD), combined (N/A, 2/12/2018); Esophagoscopy, gastroscopy, duodenoscopy (EGD), combined (N/A, 3/5/2018); Esophagoscopy, gastroscopy, duodenoscopy (EGD), combined (N/A, 4/16/2018); Esophagoscopy, gastroscopy, duodenoscopy (EGD), combined (N/A, 5/30/2018); Endoscopic ultrasound upper gastrointestinal tract (GI) (N/A, 10/1/2018); Sigmoidoscopy flexible (N/A, 10/1/2018); Endoscopic ultrasound lower gastrointestional tract (GI) (N/A, 10/15/2018); Abdominal paracentesis (11/27/2018); IR Thoracentesis (2/14/2019); and IR Paracentesis (2/18/2019).  FAMILY HISTORY: family history includes Cirrhosis in his father; Medical History Unknown in his brother, brother, brother, and mother. He was adopted.  SOCIAL HISTORY:  reports that  has never smoked. he has never used smokeless tobacco. He reports that he does not drink alcohol or use drugs.    Post Discharge Medication Reconciliation Status: discharge medications reconciled and changed, per note/orders (see AVS).  Current Outpatient Medications   Medication Sig Dispense Refill     blood glucose  monitoring (ONETOUCH ULTRA) test strip Use to test blood sugars 4 times daily as needed or as directed. 400 each 1     Calcium Carb-Cholecalciferol (CALCIUM 500 +D) 500-400 MG-UNIT TABS Take 500 mg by mouth daily 90 tablet 1     ciclopirox (LOPROX) 0.77 % cream Apply topically 2 times daily To feet and toenails. 90 g 4     ferrous sulfate (IRON) 325 (65 FE) MG tablet Take 1 tablet (325 mg) by mouth 2 times daily 200 tablet 3     folic acid (FOLVITE) 1 MG tablet Take 1 tablet (1 mg) by mouth daily 90 tablet 3     furosemide (LASIX) 20 MG tablet Take 1 tablet (20 mg) by mouth daily 30 tablet 3     gabapentin (NEURONTIN) 100 MG capsule Take 2 capsules (200 mg) by mouth 3 times daily Take up to 300mg three times daily for agitation/anxiety 90 capsule 2     hydrocortisone (CORTAID) 0.5 % external cream Apply topically 2 times daily To itchy areas on arms       lactulose (CHRONULAC) 10 GM/15ML solution Take 60 mLs by mouth 3 times daily       levETIRAcetam (KEPPRA) 750 MG tablet Take 1 tablet (750 mg) by mouth 3 times daily 93 tablet 11     levothyroxine (SYNTHROID/LEVOTHROID) 88 MCG tablet Take 1 tablet (88 mcg) by mouth daily 90 tablet 1     melatonin 3 MG tablet Take 3 mg by mouth At Bedtime       [START ON 4/14/2019] methylphenidate (RITALIN) 10 MG tablet Take 0.5 tablets (5 mg) by mouth 2 times daily (take at 8AM and 2pm) 30 tablet 0     midodrine (PROAMATINE) 5 MG tablet Take 3 tablets (15 mg) by mouth 3 times daily (with meals) 270 tablet 0     mirtazapine (REMERON) 30 MG tablet Take 1 tablet (30 mg) by mouth At Bedtime 30 tablet 0     multivitamin, therapeutic with minerals (THERA-VIT-M) TABS Take 1 tablet by mouth 2 times daily       omeprazole (PRILOSEC) 20 MG CR capsule Take 2 capsules (40 mg) by mouth 2 times daily 360 capsule 3     ONE TOUCH LANCETS MISC by In Vitro route 4 times daily as needed 100 each prn     order for DME Equipment being ordered: manual wheelchair and cushion    [employer] light weight Sx5  "18x18, 17.5 inch floor height and a 2 inch 18x18 inch cushion    length of need : lifetime        Patient's height : 5/10\", weight : 172 pounds 1 Device 0     pravastatin (PRAVACHOL) 80 MG tablet Take 1 tablet (80 mg) by mouth daily 90 tablet 3     spironolactone (ALDACTONE) 25 MG tablet Take 2 tablets (50 mg) by mouth daily 180 tablet 0     thiamine (VITAMIN B-1) 100 MG tablet Take 1 tablet (100 mg) by mouth daily 100 tablet 1     Vitamin D, Cholecalciferol, 1000 units TABS Take 1 tablet by mouth daily       XIFAXAN 550 MG TABS tablet TAKE ONE TABLET BY MOUTH TWICE DAILY 60 tablet 11     hydrOXYzine (ATARAX) 10 MG tablet Take 1 tablet (10 mg) by mouth 3 times daily 90 tablet 3       ROS:  Limited secondary to cognitive impairment but today pt reports I'm doing pretty good    Exam:  /69   Pulse 73   Temp 97.7  F (36.5  C)   Resp 24   Ht 1.676 m (5' 6\")   Wt 70.4 kg (155 lb 3.2 oz)   SpO2 95%   BMI 25.05 kg/m    GENERAL APPEARANCE:  in no distress, thin, appears ill  ENT:  Mouth and posterior oropharynx normal, moist mucous membranes  RESP:  lungs clear to auscultation , diminished breath sounds R base/mid lobe  CV:  regular rate and rhythm, no murmur, rub, or gallop, no edema  ABDOMEN:  bowel sounds normal  M/S:   Gait and station abnormal weak, requires assist transfer, WC mobility  SKIN:  Inspection of skin and subcutaneous tissueWNL, slightly jaundice  NEURO:   Examination of sensation by touch normal  PSYCH:  oriented to self, memory impaired , affect and mood normal    Lab/Diagnostic data:    CBC RESULTS:   Recent Labs   Lab Test 02/14/19  0625 02/13/19  1549 02/13/19  0635   WBC 2.1*  --  1.9*   RBC 3.08*  --  2.74*   HGB 10.4* 10.1* 9.2*   HCT 31.9*  --  28.0*   *  --  102*   MCH 33.8*  --  33.6*   MCHC 32.6  --  32.9   RDW 15.9*  --  16.0*   PLT 34* 30* 35*       Last Basic Metabolic Panel:  Recent Labs   Lab Test 02/19/19  0615 02/18/19  2110 02/18/19  0935 02/17/19  0842     --  " 137 137   POTASSIUM 3.5 4.1 3.2* 3.5   CHLORIDE 109  --   --  111*   UCHE 8.3*  --   --  8.2*   CO2 17*  --   --  19*   BUN 23  --   --  26   CR 1.34*  --  1.45* 1.43*   *  --   --  110*       Liver Function Studies -   Recent Labs   Lab Test 02/19/19  0615 02/18/19  0935 02/17/19  0842   PROTTOTAL  --  6.6* 6.4*   ALBUMIN  --  3.1* 3.0*   BILITOTAL 1.1 1.2 1.2   ALKPHOS 337* 357* 326*   AST 51* 51* 52*   ALT  --  38 32       TSH   Date Value Ref Range Status   12/02/2018 4.01 (H) 0.40 - 4.00 mU/L Final   12/01/2018 4.05 (H) 0.40 - 4.00 mU/L Final     Lab Results   Component Value Date    INR 1.67 02/19/2019    INR 1.56 02/18/2019    INR 1.68 02/16/2019    INR 1.68 02/15/2019    INR 1.59 02/14/2019    INR 1.74 02/13/2019    INR 1.47 02/12/2019     Lab Results   Component Value Date    A1C 5.2 04/11/2018    A1C 7.8 12/20/2017       ASSESSMENT/PLAN:  Hepatic encephalopathy (H)  Alcoholic cirrhosis, unspecified whether ascites present (H)  -chronic and progressive symptoms  -had thoracentesis Q2 weeks, now indicated for Q1 week  -today AOx1, scant jaundice, moderate abdominal distension  -discontinue trazodone, hepatic impairment  -CBC/BMP/Hepatic panel/TSH on Mon 2/25 to follow up  -current MELD 16, plan is to initiate hospice if MELD increases to 20  -follow up with , [facility] as indicated  -continue lactulose 40g TID, xifaxan 550mg BID, midodrine 15mg TID  -spouse agrees that after labs on 2/25 will consider weaning/DC non-essential medications   -facility to contact IR at [facility] to have thoracentesis week of 2/25, last paracentesis on 2/14 removed 9.5L    Acute respiratory failure with hypoxia (H)  LENA (obstructive sleep apnea)  -no s/s SOB, sats WNL  -CXR 2-view stat, R lung base no flow, abdominal distension    JOSEF (acute kidney injury) (H)  -creats recently in the 1.3-1.5 range  -dose medications renally  -BMP on 2/25    History of seizure  -unknown most recent seizure activity, none since arrival, 2/2  Hx of excision of astrocytoma  -continue keppra 750mg TID  -keppra level on 2/25    History of GI bleed  -historical, no recent issue since arrival, Hx of varices  -continue omeprazole  -staff to monitor output    Malnutrition, unspecified type (H)  -followed by facility dietician  -intake moderate, appetite moderate  -if indicated plan to start supplement    Hypothyroidism, unspecified type  -TSH in Dec 2018 was 4.01  -goal TSH 3-5  -continue levothyroxine 88mcg  -recheck TSH on 2/25/19    Peripheral polyneuropathy  -historical  -continue gabapentin 200mg TID (previously taking 300mg TID)    Moderate major depression (H)  Mood disorder (H)  Attention deficit hyperactivity disorder (ADHD), unspecified ADHD type  Anxiety  -historical issues, since arrival no report of behaviors, per therapy can be angry and decline  -change hydroxyzine to 10mg TID scheduled  -continue ritalin 10mg BID (recently increased from 5 to 10)  -continue mirtazapine 30mg at bedtime although would prefer to decrease dose  -consider psych eval/treat if having s/s depression  -has appt with  at [facility] scheduled for 3/6  -has appt with [facility] on 4/26 for MR Brain    Hyperlipidemia, unspecified hyperlipidemia type  -no recent lipid panel  -continue pravastatin 80mg  -consider decrease and/or discontinue in near future    Insomnia, unspecified type  -report of not sleeping well in non-home environment  -discontinue trazodone  -increase melatonin from 1 to 3mg Qhs      Type 2 diabetes mellitus without complication, without long-term current use of insulin (H)  -A1C on 4/11/18 was 5.2  -change BG checks to Qam  -not taking BG meds  -if BG's elevated consider Tx    Gastroesophageal reflux disease, esophagitis presence not specified  -no s/s gut disturbances, intake OK  -continue omeprazole 40mg BID for now  -will plan to taper dose in 1-2 weeks      Orders:  1. CBC/BMP/Hepatic Panel/TSH on Monday 2/25 - Dx - Cirrhosis , pleural  effusion / hypothyroidism   2. Change Hydroxyzine to 10mg po q8h scheduled  3. Discontinue Trazodone  4. Increase Melatonin to 3mg po at bedtime  5. Change BG checks to qam, notify PCP if results < 100 , > 500  6. Contact [facility] IR department  RE: Thoracentesis procedure on Monday 2/25 for recurrent ascites  7. CXR 2 view stat - Dx - Cough / ascites    Total time spent with patient visit at the skilled nursing facility was 60 including patient visit, review of past records and contact with spouse plus caregivers. Greater than 50% of total time spent with counseling and coordinating care due to chronic on acute health status requiring complex decision making and planning.    Electronically signed by:  TIESHA Maradiaga CNP  [facility]

## 2019-02-24 NOTE — TELEPHONE ENCOUNTER
Family concerned about increased agitation and anxiety  Nursing report that they have not seen any change in condition, refuses therapy  Vitals: 97.9, 64, 20, 132/78  Has cirrhosis, on laculose TID, having stools  Has labs scheduled for tomorrow, BMP, CBC, hepatic panel  Family feel increased agitation and encephalopathy they think he should go to ER  Order: no new orders, if patient family insist on taking to ER that is their right, but no order for ER at this time

## 2019-02-26 NOTE — TELEPHONE ENCOUNTER
Social Work   Incoming/Outgoing Call  [facility]      Outgoing Call To: Yisel Varghese, spouse    Reason for Call:    -Follow-up call to see results of Yisel's meeting with Jared Doshi ([phone]), [facility] .  Yisel reported Mono was approved for a stair lift and the PCA hours were changed to a traditional program.  Yisel expressed relief knowing there are several PCAs in the geographical area that could provide service.      -Yisel reported that Mono was transferred to a Rehab facility.  He is currently refusing to participate in most programming when she is not present, however this has been his historical pattern.  At this time Mono will remain at Rehab Center until stair lift can be installed.  She expressed some concern about meeting all of Mono's.  Hospice has been discussed; however at this time Mono decided he is not ready for the service.      Response/Plan:  Yisel agreed to follow-up call next week to discuss needs.      Will route to patient's current psychiatric provider(s) as an FYI.   Please call or EPIC message with any questions or concerns.    Majo Lazo BA  Social Work Intern  ([phone])

## 2019-02-27 NOTE — TELEPHONE ENCOUNTER
Facility called, updates:  Labs done 2/25  Tbili 2.6, Dbili 1.2, alkphos 331, AST 47  Added a PRN lactulose  K was 3.3; started K 20 mEq  Levitarecetam level 84.5; reduced keppra from 750 to 500mg TID  Paracentesis completed 2/26  Ammonia level at paracentesis 111  Has paracentesis and psychology appt on 3/6

## 2019-02-28 NOTE — TELEPHONE ENCOUNTER
Pt is requesting DME orders for pullup overnight briefs(size m), personal wet wipes, and a hospital bed. Do you approve these orders ?    [facility]

## 2019-02-28 NOTE — PROGRESS NOTES
[place] [facility] DISCHARGE SUMMARY    PATIENT'S NAME: Mono Varghese  YOB: 1970  MEDICAL RECORD NUMBER:  [id number]  Place of Service where encounter took place:  [facility] ([facility]) [[id number]]    PRIMARY CARE PROVIDER AND CLINIC RESPONSIBLE AFTER TRANSFER: [place] [address] [facility] [address] / [facility] [address]     TRANSFERRING PROVIDERS: TIESHA Maradiaga CNP, Georgina Aguirre MD  DATE OF SNF ADMISSION:  February / 19 / 2019  DATE OF SNF (anticipated) DISCHARGE: March 4. 2019  DISCHARGE DISPOSITION: Non-[facility] Provider   RECENT HOSPITALIZATION/ED:  [facility] stay 2/12/19 to 2/19/19.     CODE STATUS/ADVANCE DIRECTIVES DISCUSSION:   DNR / DNI     Allergies   Allergen Reactions     Silver Nitrate      Tegaderm is ok if skip prep is used prior to application.     Latex Itching and Rash     No Clinical Screening - See Comments      Coban and Surgilast cause itching     Tegaderm Transparent Dressing (Informational Only)      Condition on Discharge:  Declining.  Function:  Limited  Cognitive Scores: BIMS 12/15 and PHQ9 01/27.    Equipment: wheelchair    DISCHARGE DIAGNOSIS:   1. Hepatic encephalopathy (H)    2. Alcoholic cirrhosis, unspecified whether ascites present (H)    3. Acute respiratory failure with hypoxia (H)    4. LENA (obstructive sleep apnea)    5. JOSEF (acute kidney injury) (H)    6. History of seizure    7. History of GI bleed    8. Malnutrition, unspecified type (H)    9. Hypothyroidism, unspecified type    10. Peripheral polyneuropathy    11. Major depressive disorder, remission status unspecified, unspecified whether recurrent    12. Mood disorder (H)    13. Attention deficit hyperactivity disorder (ADHD), unspecified ADHD type    14. Anxiety    15. Hyperlipidemia, unspecified hyperlipidemia type    16. Insomnia, unspecified type    17. Type 2 diabetes mellitus without complication, without  long-term current use of insulin (H)    18. Gastroesophageal reflux disease, esophagitis presence not specified        HPI Nursing Facility Course:  HPI information obtained from: facility chart records, facility staff, patient report and [facility] chart review.    Hepatic encephalopathy (H)  Alcoholic cirrhosis, unspecified whether ascites present (H)  -chronic and progressive symptoms  -had paracentesis Q2 weeks, now indicated for Q1 week  -today AOx1, scant jaundice, moderate abdominal distension  -discontinue trazodone, hepatic impairment  -CBC/BMP/Hepatic panel/TSH on Mon 2/25 to follow up  -current MELD 16, plan is to initiate hospice if MELD increases to 20  -follow up with , [facility] as indicated  -continue lactulose 40g TID and 40mg PRN, xifaxan 550mg BID, midodrine 15mg TID  -facility to contact IR at [facility] to have paracentesis week of 2/25, paracentesis on 2/14 removed 9.5L, then on 2/26 removed 4L  -next paracentesis scheduled for 3/6    Acute respiratory failure with hypoxia (H)  LENA (obstructive sleep apnea)  -no s/s SOB, sats WNL  -CXR 2-view stat, R lung base no flow, abdominal distension    JOSEF (acute kidney injury) (H)  -creats recently in the 1.3-1.5 range  -dose medications renally  -BMP follow clinically    History of seizure  -unknown most recent seizure activity, none since arrival, 2/2 Hx of excision of astrocytoma  -continue keppra 750mg TID  -keppra levels follow clnically    History of GI bleed  -historical, no recent issue since arrival, Hx of varices  -continue omeprazole  -staff to monitor output    Malnutrition, unspecified type (H)  -followed by facility dietician  -intake moderate, appetite moderate  -if indicated plan to start supplement    Hypothyroidism, unspecified type  -TSH in Dec 2018 was 4.01  -goal TSH 3-5  -continue levothyroxine 88mcg  -recheck TSH clinically    Peripheral polyneuropathy  -historical  -continue gabapentin 200mg TID (previously taking 300mg  TID)    Moderate major depression (H)  Mood disorder (H)  Attention deficit hyperactivity disorder (ADHD), unspecified ADHD type  Anxiety  -historical issues, since arrival no report of behaviors, per therapy can be angry and decline  -change hydroxyzine to 10mg TID scheduled  -continue ritalin 10mg BID (recently increased from 5 to 10)  -continue mirtazapine 30mg at bedtime although would prefer to decrease dose  -consider psych eval/treat if having s/s depression  -has appt with  at [facility] scheduled for 3/6  -has appt with [facility] on 4/26 for MR Brain    Hyperlipidemia, unspecified hyperlipidemia type  -no recent lipid panel  -continue pravastatin 80mg  -consider decrease and/or discontinue in near future    Insomnia, unspecified type  -report of not sleeping well in non-home environment  -increase trazodone to 100mg Qhs  -increase melatonin from 1 to 3mg Qhs      Type 2 diabetes mellitus without complication, without long-term current use of insulin (H)  -A1C on 4/11/18 was 5.2  -change BG checks to Qam  -not taking BG meds  -if BG's elevated consider Tx    Gastroesophageal reflux disease, esophagitis presence not specified  -no s/s gut disturbances, intake OK  -continue omeprazole 40mg BID for now  -will plan to taper dose in 1-2 weeks      PAST MEDICAL HISTORY:  has a past medical history of ADHD, Alcoholic cirrhosis of liver with ascites (H) (06/17/2015), Ascites due to alcoholic cirrhosis (H), Chronic pain (8/1/2016), Depressive disorder (02/01/2001), Encephalopathy, hepatic (H) (7/29/2017), GERD (gastroesophageal reflux disease), GIB (gastrointestinal bleeding) (11/23/2015), 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 He also has no past medical history of Chronic infection.    DISCHARGE MEDICATIONS:  Current Outpatient Medications   Medication Sig Dispense Refill     blood glucose monitoring (ONETOUCH ULTRA) test strip Use to test blood sugars 4 times daily as needed or as directed. 400 each 1     Calcium Carb-Cholecalciferol (CALCIUM 500 +D) 500-400 MG-UNIT TABS Take 500 mg by mouth daily 90 tablet 1     ferrous sulfate (IRON) 325 (65 FE) MG tablet Take 1 tablet (325 mg) by mouth 2 times daily 200 tablet 3     folic acid (FOLVITE) 1 MG tablet Take 1 tablet (1 mg) by mouth daily 90 tablet 3     furosemide (LASIX) 20 MG tablet Take 1 tablet (20 mg) by mouth daily 30 tablet 3     gabapentin (NEURONTIN) 300 MG capsule Take 300 mg by mouth 3 times daily       hydrocortisone (CORTAID) 0.5 % external cream Apply topically 2 times daily To itchy areas on arms       hydrOXYzine (ATARAX) 10 MG tablet Take 1 tablet (10 mg) by mouth 3 times daily 90 tablet 3     lactulose (CHRONULAC) 10 GM/15ML solution Take 60 mLs by mouth 3 times daily       lactulose encephalopathy (CHRONULAC) 10 GM/15ML SOLUTION Take 15 mLs by mouth daily as needed for constipation       levETIRAcetam (KEPPRA) 500 MG tablet Take 500 mg by mouth 3 times daily       levothyroxine (SYNTHROID/LEVOTHROID) 88 MCG tablet Take 1 tablet (88 mcg) by mouth daily 90 tablet 1     melatonin 3 MG tablet Take 3 mg by mouth nightly as needed        methylphenidate (RITALIN) 10 MG tablet Take 10 mg by mouth 2 times daily       midodrine (PROAMATINE) 5 MG tablet Take 3 tablets (15 mg) by mouth 3 times daily (with meals) 270 tablet 0     mirtazapine (REMERON) 30 MG tablet Take 1 tablet (30 mg) by mouth At Bedtime 30 tablet 0     multivitamin, therapeutic with minerals  "(THERA-VIT-M) TABS Take 1 tablet by mouth 2 times daily       omeprazole (PRILOSEC) 20 MG CR capsule Take 2 capsules (40 mg) by mouth 2 times daily 360 capsule 3     ONE TOUCH LANCETS MISC by In Vitro route 4 times daily as needed 100 each prn     order for DME Overnight briefs, size M 90 each 11     order for DME Personal wet wipes 10 Package 11     order for DME Hospital bd 1 Device 0     order for DME Equipment being ordered: manual wheelchair and cushion    [employer] light weight Sx5 18x18, 17.5 inch floor height and a 2 inch 18x18 inch cushion    length of need : lifetime        Patient's height : 5/10\", weight : 172 pounds 1 Device 0     POTASSIUM CHLORIDE ER PO Take 20 mEq by mouth daily       pravastatin (PRAVACHOL) 80 MG tablet Take 1 tablet (80 mg) by mouth daily 90 tablet 3     spironolactone (ALDACTONE) 25 MG tablet Take 2 tablets (50 mg) by mouth daily 180 tablet 0     thiamine (VITAMIN B-1) 100 MG tablet Take 1 tablet (100 mg) by mouth daily 100 tablet 1     Vitamin D, Cholecalciferol, 1000 units TABS Take 1 tablet by mouth daily       XIFAXAN 550 MG TABS tablet TAKE ONE TABLET BY MOUTH TWICE DAILY 60 tablet 11       MEDICATION CHANGES/RATIONALE:   See above    Controlled medications sent with patient:   Ritalin 10mg BID +- 60 tabs left in cart to be sent with patient     ROS:    Unobtainable secondary to cognitive impairment.     Physical Exam:   Vitals: /68   Pulse 70   Temp 98.7  F (37.1  C)   Resp 24   Ht 1.676 m (5' 6\")   Wt 70.4 kg (155 lb 3.2 oz)   SpO2 96%   BMI 25.05 kg/m    BMI= Body mass index is 25.05 kg/m .    GENERAL APPEARANCE:  thin, appears ill  ENT:  Mouth and posterior oropharynx normal, moist mucous membranes  RESP:  no respiratory distress, diminished breath sounds R base  CV:  regular rate and rhythm, no murmur, rub, or gallop, no edema  ABDOMEN:  bowel sounds normal  M/S:   Gait and station abnormal weak, deconditioned  SKIN:  Inspection of skin and subcutaneous " tissue baseline  NEURO:   Examination of sensation by touch normal  PSYCH:  oriented to self, memory impaired     DISCHARGE PLAN:  Hospice  Patient instructed to follow-up with:  PCP in 7 days      Current [place] scheduled appointments:  Future Appointments   Date Time Provider Department Center   3/6/2019  7:30 AM Provider,  Spec Inf Para [facility]   3/6/2019  1:30 PM Leroy Alcaraz MD URPSY [facility]   3/21/2019  2:20 PM King Louis MD [facility]   4/26/2019 10:00 AM UCMR1 [facility]   5/3/2019 10:45 AM Lauro Shaw MD [facility]       MTM referral needed and placed by this provider: No    Pending labs: NA    [facility] labs     CBC RESULTS:   Recent Labs   Lab Test 02/25/19 02/14/19  0625   WBC 2.7* 2.1*   RBC 3.58* 3.08*   HGB 11.8* 10.4*   HCT 36.2* 31.9*   * 104*   MCH 33.0 33.8*   MCHC 32.6 32.6   RDW 15.4* 15.9*   PLT 40* 34*       Last Basic Metabolic Panel:  Recent Labs   Lab Test 02/25/19 02/19/19  0615   * 137   POTASSIUM 3.3* 3.5   CHLORIDE 108* 109   UCHE 8.8 8.3*   CO2 18* 17*   BUN 18 23   CR 1.34* 1.34*   * 144*       Liver Function Studies -   Recent Labs   Lab Test 02/25/19 02/19/19  0615 02/18/19  0935   PROTTOTAL 6.9  --  6.6*   ALBUMIN 3.5  --  3.1*   BILITOTAL 2.6* 1.1 1.2   ALKPHOS 331* 337* 357*   AST 47* 51* 51*   ALT 32  --  38       TSH   Date Value Ref Range Status   02/25/2019 2.83 0.30 - 5.00 uIU/mL Final   12/02/2018 4.01 (H) 0.40 - 4.00 mU/L Final     Lab Results   Component Value Date    INR 1.67 02/19/2019    INR 1.56 02/18/2019    INR 1.68 02/16/2019    INR 1.68 02/15/2019    INR 1.59 02/14/2019    INR 1.74 02/13/2019    INR 1.47 02/12/2019       Lab Results   Component Value Date    A1C 5.2 04/11/2018    A1C 7.8 12/20/2017         Discharge Treatments:NA    TOTAL DISCHARGE TIME:   Greater than 30 minutes     Electronically signed by:  TIESHA Maradiaga [facility]

## 2019-03-01 NOTE — LETTER
3/1/2019        RE: Mnoo Varghese  [address]  [place] [facility] [place] [address]          [place] [facility] DISCHARGE SUMMARY    PATIENT'S NAME: Mono Varghese  YOB: 1970  MEDICAL RECORD NUMBER:  [id number]  Place of Service where encounter took place:  [facility] ([facility] [[id number]]    PRIMARY CARE PROVIDER AND CLINIC RESPONSIBLE AFTER TRANSFER: King [place] [address] [facility] [address] / [facility] [address]     TRANSFERRING PROVIDERS: TIESHA Maradiaga CNP, Georgina Aguirre MD  DATE OF SNF ADMISSION:  February / 19 / 2019  DATE OF SNF (anticipated) DISCHARGE: March 4. 2019  DISCHARGE DISPOSITION: Non-[facility] Provider   RECENT HOSPITALIZATION/ED:  [facility] stay 2/12/19 to 2/19/19.     CODE STATUS/ADVANCE DIRECTIVES DISCUSSION:   DNR / DNI     Allergies   Allergen Reactions     Silver Nitrate      Tegaderm is ok if skip prep is used prior to application.     Latex Itching and Rash     No Clinical Screening - See Comments      Coban and Surgilast cause itching     Tegaderm Transparent Dressing (Informational Only)      Condition on Discharge:  Declining.  Function:  Limited  Cognitive Scores: BIMS 12/15 and PHQ9 01/27.    Equipment: wheelchair    DISCHARGE DIAGNOSIS:   1. Hepatic encephalopathy (H)    2. Alcoholic cirrhosis, unspecified whether ascites present (H)    3. Acute respiratory failure with hypoxia (H)    4. LENA (obstructive sleep apnea)    5. JOSEF (acute kidney injury) (H)    6. History of seizure    7. History of GI bleed    8. Malnutrition, unspecified type (H)    9. Hypothyroidism, unspecified type    10. Peripheral polyneuropathy    11. Major depressive disorder, remission status unspecified, unspecified whether recurrent    12. Mood disorder (H)    13. Attention deficit hyperactivity disorder (ADHD), unspecified ADHD type    14. Anxiety    15. Hyperlipidemia, unspecified hyperlipidemia type    16.  Insomnia, unspecified type    17. Type 2 diabetes mellitus without complication, without long-term current use of insulin (H)    18. Gastroesophageal reflux disease, esophagitis presence not specified        HPI Nursing Facility Course:  HPI information obtained from: facility chart records, facility staff, patient report and [facility] chart review.    Hepatic encephalopathy (H)  Alcoholic cirrhosis, unspecified whether ascites present (H)  -chronic and progressive symptoms  -had paracentesis Q2 weeks, now indicated for Q1 week  -today AOx1, scant jaundice, moderate abdominal distension  -discontinue trazodone, hepatic impairment  -CBC/BMP/Hepatic panel/TSH on Mon 2/25 to follow up  -current MELD 16, plan is to initiate hospice if MELD increases to 20  -follow up with , [facility] as indicated  -continue lactulose 40g TID and 40mg PRN, xifaxan 550mg BID, midodrine 15mg TID  -facility to contact IR at [facility] to have paracentesis week of 2/25, paracentesis on 2/14 removed 9.5L, then on 2/26 removed 4L  -next paracentesis scheduled for 3/6    Acute respiratory failure with hypoxia (H)  LENA (obstructive sleep apnea)  -no s/s SOB, sats WNL  -CXR 2-view stat, R lung base no flow, abdominal distension    JOSEF (acute kidney injury) (H)  -creats recently in the 1.3-1.5 range  -dose medications renally  -BMP follow clinically    History of seizure  -unknown most recent seizure activity, none since arrival, 2/2 Hx of excision of astrocytoma  -continue keppra 750mg TID  -keppra levels follow clnically    History of GI bleed  -historical, no recent issue since arrival, Hx of varices  -continue omeprazole  -staff to monitor output    Malnutrition, unspecified type (H)  -followed by facility dietician  -intake moderate, appetite moderate  -if indicated plan to start supplement    Hypothyroidism, unspecified type  -TSH in Dec 2018 was 4.01  -goal TSH 3-5  -continue levothyroxine 88mcg  -recheck TSH clinically    Peripheral  polyneuropathy  -historical  -continue gabapentin 200mg TID (previously taking 300mg TID)    Moderate major depression (H)  Mood disorder (H)  Attention deficit hyperactivity disorder (ADHD), unspecified ADHD type  Anxiety  -historical issues, since arrival no report of behaviors, per therapy can be angry and decline  -change hydroxyzine to 10mg TID scheduled  -continue ritalin 10mg BID (recently increased from 5 to 10)  -continue mirtazapine 30mg at bedtime although would prefer to decrease dose  -consider psych eval/treat if having s/s depression  -has appt with  at [facility] scheduled for 3/6  -has appt with [facility] on 4/26 for MR Brain    Hyperlipidemia, unspecified hyperlipidemia type  -no recent lipid panel  -continue pravastatin 80mg  -consider decrease and/or discontinue in near future    Insomnia, unspecified type  -report of not sleeping well in non-home environment  -increase trazodone to 100mg Qhs  -increase melatonin from 1 to 3mg Qhs      Type 2 diabetes mellitus without complication, without long-term current use of insulin (H)  -A1C on 4/11/18 was 5.2  -change BG checks to Qam  -not taking BG meds  -if BG's elevated consider Tx    Gastroesophageal reflux disease, esophagitis presence not specified  -no s/s gut disturbances, intake OK  -continue omeprazole 40mg BID for now  -will plan to taper dose in 1-2 weeks      PAST MEDICAL HISTORY:  has a past medical history of ADHD, Alcoholic cirrhosis of liver with ascites (H) (06/17/2015), Ascites due to alcoholic cirrhosis (H), Chronic pain (8/1/2016), Depressive disorder (02/01/2001), Encephalopathy, hepatic (H) (7/29/2017), GERD (gastroesophageal reflux disease), GIB (gastrointestinal bleeding) (11/23/2015), 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 He also has no past medical history of Chronic infection.    DISCHARGE MEDICATIONS:  Current Outpatient Medications   Medication Sig Dispense Refill     blood glucose monitoring (ONETOUCH ULTRA) test strip Use to test blood sugars 4 times daily as needed or as directed. 400 each 1     Calcium Carb-Cholecalciferol (CALCIUM 500 +D) 500-400 MG-UNIT TABS Take 500 mg by mouth daily 90 tablet 1     ferrous sulfate (IRON) 325 (65 FE) MG tablet Take 1 tablet (325 mg) by mouth 2 times daily 200 tablet 3     folic acid (FOLVITE) 1 MG tablet Take 1 tablet (1 mg) by mouth daily 90 tablet 3     furosemide (LASIX) 20 MG tablet Take 1 tablet (20 mg) by mouth daily 30 tablet 3     gabapentin (NEURONTIN) 300 MG capsule Take 300 mg by mouth 3 times daily       hydrocortisone (CORTAID) 0.5 % external cream Apply topically 2 times daily To itchy areas on arms       hydrOXYzine (ATARAX) 10 MG tablet Take 1 tablet (10 mg) by mouth 3 times daily 90 tablet 3     lactulose (CHRONULAC) 10 GM/15ML solution Take 60 mLs by mouth 3 times daily       lactulose encephalopathy (CHRONULAC) 10 GM/15ML SOLUTION Take 15 mLs by mouth daily as needed for constipation       levETIRAcetam (KEPPRA) 500 MG tablet Take 500 mg by mouth 3 times daily       levothyroxine (SYNTHROID/LEVOTHROID) 88 MCG tablet Take 1 tablet (88 mcg) by mouth daily 90 tablet 1     melatonin 3 MG tablet Take 3 mg by mouth nightly as needed        methylphenidate (RITALIN) 10 MG tablet Take 10 mg by mouth 2 times daily       midodrine (PROAMATINE) 5 MG tablet Take 3 tablets (15 mg) by mouth 3 times daily (with meals) 270 tablet 0     mirtazapine (REMERON) 30 MG tablet Take 1 tablet (30 mg)  "by mouth At Bedtime 30 tablet 0     multivitamin, therapeutic with minerals (THERA-VIT-M) TABS Take 1 tablet by mouth 2 times daily       omeprazole (PRILOSEC) 20 MG CR capsule Take 2 capsules (40 mg) by mouth 2 times daily 360 capsule 3     ONE TOUCH LANCETS MISC by In Vitro route 4 times daily as needed 100 each prn     order for DME Overnight briefs, size M 90 each 11     order for DME Personal wet wipes 10 Package 11     order for DME Hospital bd 1 Device 0     order for DME Equipment being ordered: manual wheelchair and cushion    [employer] light weight Sx5 18x18, 17.5 inch floor height and a 2 inch 18x18 inch cushion    length of need : lifetime        Patient's height : 5/10\", weight : 172 pounds 1 Device 0     POTASSIUM CHLORIDE ER PO Take 20 mEq by mouth daily       pravastatin (PRAVACHOL) 80 MG tablet Take 1 tablet (80 mg) by mouth daily 90 tablet 3     spironolactone (ALDACTONE) 25 MG tablet Take 2 tablets (50 mg) by mouth daily 180 tablet 0     thiamine (VITAMIN B-1) 100 MG tablet Take 1 tablet (100 mg) by mouth daily 100 tablet 1     Vitamin D, Cholecalciferol, 1000 units TABS Take 1 tablet by mouth daily       XIFAXAN 550 MG TABS tablet TAKE ONE TABLET BY MOUTH TWICE DAILY 60 tablet 11       MEDICATION CHANGES/RATIONALE:   See above    Controlled medications sent with patient:   Ritalin 10mg BID +- 60 tabs left in cart to be sent with patient     ROS:    Unobtainable secondary to cognitive impairment.     Physical Exam:   Vitals: /68   Pulse 70   Temp 98.7  F (37.1  C)   Resp 24   Ht 1.676 m (5' 6\")   Wt 70.4 kg (155 lb 3.2 oz)   SpO2 96%   BMI 25.05 kg/m     BMI= Body mass index is 25.05 kg/m .    GENERAL APPEARANCE:  thin, appears ill  ENT:  Mouth and posterior oropharynx normal, moist mucous membranes  RESP:  no respiratory distress, diminished breath sounds R base  CV:  regular rate and rhythm, no murmur, rub, or gallop, no edema  ABDOMEN:  bowel sounds normal  M/S:   Gait and station " abnormal weak, deconditioned  SKIN:  Inspection of skin and subcutaneous tissue baseline  NEURO:   Examination of sensation by touch normal  PSYCH:  oriented to self, memory impaired     DISCHARGE PLAN:  Hospice  Patient instructed to follow-up with:  PCP in 7 days      Current [place] scheduled appointments:  Future Appointments   Date Time Provider Department Center   3/6/2019  7:30 AM Provider,  Spec Inf Para [facility]PR [facility]   3/6/2019  1:30 PM Leroy Alcaraz MD URPSY [facility]IN   3/21/2019  2:20 PM King Louis MD [facility]   4[facility]26/2019 10[id number]   5/3/2019 10:45 AM Lauro Shaw MD [facility]       MTM referral needed and placed by this provider: No    Pending labs: NA    SNF labs     CBC RESULTS:   Recent Labs   Lab Test 02/25/19 02/14/19  0625   WBC 2.7* 2.1*   RBC 3.58* 3.08*   HGB 11.8* 10.4*   HCT 36.2* 31.9*   * 104*   MCH 33.0 33.8*   MCHC 32.6 32.6   RDW 15.4* 15.9*   PLT 40* 34*       Last Basic Metabolic Panel:  Recent Labs   Lab Test 02/25/19 02/19/19  0615   * 137   POTASSIUM 3.3* 3.5   CHLORIDE 108* 109   UCHE 8.8 8.3*   CO2 18* 17*   BUN 18 23   CR 1.34* 1.34*   * 144*       Liver Function Studies -   Recent Labs   Lab Test 02/25/19 02/19/19  0615 02/18/19  0935   PROTTOTAL 6.9  --  6.6*   ALBUMIN 3.5  --  3.1*   BILITOTAL 2.6* 1.1 1.2   ALKPHOS 331* 337* 357*   AST 47* 51* 51*   ALT 32  --  38       TSH   Date Value Ref Range Status   02/25/2019 2.83 0.30 - 5.00 uIU/mL Final   12/02/2018 4.01 (H) 0.40 - 4.00 mU/L Final     Lab Results   Component Value Date    INR 1.67 02/19/2019    INR 1.56 02/18/2019    INR 1.68 02/16/2019    INR 1.68 02/15/2019    INR 1.59 02/14/2019    INR 1.74 02/13/2019    INR 1.47 02/12/2019       Lab Results   Component Value Date    A1C 5.2 04/11/2018    A1C 7.8 12/20/2017         Discharge Treatments:NA    TOTAL DISCHARGE TIME:   Greater than 30 minutes     Electronically signed by:  TIESHA Maradiaga  CNP  [facility]        Sincerely,        TIESHA Maradiaga CNP

## 2019-03-05 PROBLEM — K70.10 ALCOHOLIC HEPATITIS WITHOUT ASCITES (H): Status: ACTIVE | Noted: 2019-01-01

## 2019-03-06 NOTE — TELEPHONE ENCOUNTER
Social Work   Incoming/Outgoing Call  [facility]    Outgoing Call To: Yisel Varghese, wife    Reason for Call:  Follow-up in regards to Mono's needs and the needs of the family.    Response/Plan:  Writer spoke to Yisel, who reported she is in the hospital due to pneumonia.  She reported the PCA she had secured in no longer going to accept the position.  Another PCA is trying to secure  so they could accept position.  Writer provided empathetic listening, as Yisel expressed frustration.  Mono is ready to be discharged home from rehabilitation center, however Yisel needs to establish supports.  Mono will discharge with hospice services.    -Yisel will try to secure PCA.  -Writer will follow-up with Yisel next week to determine what assistance can be provided.      Will route to patient's current psychiatric provider(s) as an FYI.   Please call or EPIC message with any questions or concerns.    TERRI Jaeger  Social Work Intern  [phone]

## 2019-03-06 NOTE — PROGRESS NOTES
PSYCHIATRY CLINIC TELEPHONE CALL    Patient is an established patient: YES    Patient or family initiated this call and patient or family called the clinic: No, this is a scheduled phone visit, so writer made the call    A telephone appt was set up: Yes: appointment was set up at last E/M visit  [if not, please explain how the call was arranged]    Date of most recent E/M visit: 2/6/2019    Date of next scheduled visit: to be scheduled in approximately 1 month    Length of this call: 18 minutes    Medicare questions asked to jose and Faby:  Is the appointment today related to any kind of accident? NO  Is his medicare based on the employment of a family member? NO     Summary of this call:   - Spoke with Mono and Faby by speakerphone    - Since last clinic visit, Mono had a hospitalization for increased weakness, dyspnea, and confusion. Was discharged from the TCU/Rehab facility on Monday. Currently at [facility]; can stay there under medical assistance benefits until Yisel recovers from her own illness and they can find a PCA.      - Briefly had trazodone stopped during hospitalization due to hepatic impairment but had a lot more difficulties sleeping. This has been restarted, and sleep improved substantially.     - Mono reports that he is doing okay; irritability is about the same. Still has frustrations about limited abilities and can get upset when others try to help. Just started meeting with the hospice team.     - Yisel is currently trying to find a PCA for Mono. Had 2 lined up, but this fell through.     - Diarrhea has gotten maybe a little bit better.     - Currently taking gabapentin 300mg TID. Has found this helpful for irritability. 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 2016, EtOH cirrhosis, hypothyroidism, pancytopenia, cognitive impairment, seizure disorder, muliple childhood AEs, major depression with multiple recent hospitalizations in the setting of weakness and confusion due to liver cirrhosis. He continues to struggle with accepting some of the physical limitations he now has. Notably, he recently just began receiving hospice services, which hopefully will be able to provide family with some respite. He and his mother both feel that his current medications are working well, and the gabapentin has been helpful with irritability. Will continue medications without change as he is tolerating his current psychotropic medications and not noticing side effects. They have requested another phone visit in approximately 1 month due to difficulties making it to clinic.     Continue Medications:  - Gabapentin 300mg TID  - Remeron 45mg QHS   - Trazodone 50mg QHS  - Ritalin 10mg BID (given 3 month supply)    Levi Alcaraz MD  PGY-3 Psychiatry Resident    Patient was not formally staffed, but case will be reviewed with Dr. Danielson in supervision.   I did not see this patient directly. I have reviewed the documentation and I agree with the resident's plan of care.     Yessi Danielson MD

## 2019-03-06 NOTE — PROGRESS NOTES
"CLINICAL NUTRITION SERVICES - ASSESSMENT NOTE     Nutrition Prescription    RECOMMENDATIONS FOR MDs/PROVIDERS TO ORDER:  Pending patient / family wishes, would recommend TF support within the next 1-3 days to avoid further nutrition deficit given patient with poor po since admit x7 days.      Malnutrition Status:    Severe malnutrition in the context of chronic condition     Recommendations already ordered by Registered Dietitian (RD):  1. Oral supplements ordered: Boost plus between meals + magic cup with meals to Improve kcal /protein intake  2. Wound healing support vitamin / minerals:   Ordered Theravit / M and zinc sulfate 220 mg daily ( beneficial also with lactulose therapy)  3. 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    --Presents with encephalopathy and generalized weakness.  Per provider, \" Hepatic Encephalopathy, resolving\"     --WOC RN evaluation on 12/3:   Pressure Injury: on BL sacrum , hospital acquired   Pressure Injury is Stage Deep Tissue Pressure Injury (DTPI)   Contributing factor of the pressure injury: pressure and shear  Status: initial assessment    --Per SLP eval, patient can remain on a regular diet and thin liquids, whole pills with thin liquid.     --Per MD note, \" Patient/family have been very clear that would not want feeding tube.  Continue to encourage oral intake\".- Care conference in next 1-2 days    NUTRITION HISTORY  Unable to obtain. Patient sound " asleep    CURRENT NUTRITION ORDERS  Diet: Regular  Intake/Tolerance: Consuming mostly 25% of meals with occasional 50% intake since admit. Patient sound asleep during my visit. Noted bkf tray at beside. Ate bites of omelet, bites of fresh fruit cup.     LABS  K+: 2.9 ( L), Bicarb: 18 (L), Phos/Mg++: normal   Total bili: 1.4 (H),   BUN/Cr: normal range     MEDICATIONS  Folic acid, methylphenidate, thiamine, D3, Calcium carbonate, B12, Ferrous sulfate, Lactulose, Remeron, Lasix     ANTHROPOMETRICS  Height: 0 cm (Data Unavailable) 177.8 cm   Most Recent Weight: 71.9 kg (158 lb 8 oz) 11/27 ( dry wt post paracentesis)   IBW: 75.45 kg (95% IBW)   BMI: 22.8 kg /m2 - Normal BMI  Weight History:   Wt Readings from Last 10 Encounters:   12/03/18 76.8 kg (169 lb 6.4 oz)   11/07/18 82.8 kg (182 lb 8 oz)   10/24/18 77.5 kg (170 lb 12.8 oz)   10/15/18 81.8 kg (180 lb 5.4 oz)   10/03/18 78 kg (172 lb)   10/02/18 78 kg (172 lb)   10/01/18 78.4 kg (172 lb 13.5 oz)   09/17/18 77.2 kg (170 lb 4.8 oz)   09/06/18 80.5 kg (177 lb 7.5 oz)   08/24/18 79.7 kg (175 lb 11.2 oz)       Dosing Weight: 72 kg driest wt this admit on 11/27     ASSESSED NUTRITION NEEDS  Estimated Energy Needs: 1800 - 2160 kcals/day (25-30 ++ kcals/kg )  Justification: Maintenance   Estimated Protein Needs: 72 - 86  grams protein/day (1 - 1.2 grams of pro/kg)  Justification: Maintenance  Estimated Fluid Needs: ESLD, Ascites  Justification: Per provider pending fluid status    PHYSICAL FINDINGS  Ascites    Baseline expressive aphasia  Delayed responses, AMS    MALNUTRITION  % Intake: </= 50% for >/= 5 days (severe)  % Weight Loss: Unable to assess  Subcutaneous Fat Loss: All severe   Muscle Loss: All severe   Fluid Accumulation/Edema: Large Ascites noted   Malnutrition Diagnosis: Severe malnutrition in the context of chronic condition     NUTRITION DIAGNOSIS  Inadequate oral intake likely related to fluctuating mental status and early satiety associated as evidenced  by patient consuming mostly 25% of meals, evidence of severe fat/ muscle wasting     INTERVENTIONS  Implementation  Nutrition Education: Not appropriate at this time due to patient condition   Medical food supplement therapy: ordered      Goals  Patient to consume % of nutritionally adequate meal trays TID, or the equivalent with supplements/snacks.     Monitoring/Evaluation  Progress toward goals will be monitored and evaluated per protocol.      Rick Malik RD/JOSELO  Pager [phone]     Likely multifactorial in setting of increasing tumor burden, hypercarbia, UTI, relative anorexia  - s/p 3 days of IV cipro for UTI  - nutrition consult  - malignancy management as below  - fall precautions  - Hospice referral

## 2019-03-07 NOTE — TELEPHONE ENCOUNTER
Spoke to Mily to relay that Dr. Louis will follow the patient through Hospice. Tiffanie Lentz LPN 3/7/2019 1:55 PM

## 2019-03-07 NOTE — TELEPHONE ENCOUNTER
Health Call Center    Phone Message    May a detailed message be left on voicemail: yes    Reason for Call: Other: Mily with [facility] requesting verbal certification that Dr. Louis will continue to be his PCP, and that he understands pt has 6 months to live and will sign his death certificate. Please advise.    Action Taken: Message routed to:  [facility] PCC

## 2019-03-12 NOTE — TELEPHONE ENCOUNTER
Last Clinic Visit: 11/12/2018  [facility]  LDL overdue clinic notified.

## 2019-03-13 NOTE — TELEPHONE ENCOUNTER
Per telephone encounter on 3/7/19 patient has entered hospice. Tiffanie Lentz LPN 3/13/2019 7:47 AM

## 2019-03-13 NOTE — TELEPHONE ENCOUNTER
Social Work   Incoming/Outgoing Call  [facility]    Outgoing Call To: Joss Varghese, spouse    Reason for Call:  Follow-up to determine if Yisel was able to secure PCA staff for Mono and assess for additional needs.    Response/Plan:  Yisel reported she was able to secure PCA to provide 40 hours.  She anticipates Mono returning home in the next few days with hospice in place.  Discussed Mono's social needs, as currently he is involved with the other residents at the rehab. facility and is worried when he returns home as it will be him and the PCA.  Discussed potential options.    SW Intern will research potential community resources available to provide the social connection, including adult day programs eligibility.  Writer will follow-up with Yisel in two weeks.      Will route to patient's current psychiatric provider(s) as an FYI.   Please call or EPIC message with any questions or concerns.    Majo Lazo BA  Social Work Intern  [phone]

## 2019-03-26 NOTE — TELEPHONE ENCOUNTER
Social Work  Outgoing Voicemail Message  [facility]      Left a detailed voicemail message for Yisel Varghese, introducing self, reason for call;  Follow-up in regards to Mono's transition home.    Writer requested call back. Included writer's direct contact information and availability.     Plan: Writer will follow-up next week if no response.        TERRI Jaeger  Social Work Inter

## 2019-03-29 PROBLEM — Z51.5 HOSPICE CARE PATIENT: Status: ACTIVE | Noted: 2019-01-01

## 2019-03-29 PROBLEM — R29.6 RECURRENT FALLS: Status: ACTIVE | Noted: 2019-01-01

## 2019-03-29 NOTE — PROGRESS NOTES
[facility]  [place] Medical Record Number:  [id number]  Place of Service where encounter took place:  [facility] ([facility]) [[id number]]  Chief Complaint   Patient presents with     RECHECK       HPI:    Mono Varghese  is a 48 year old (1970), who is being seen today for an episodic care visit.  HPI information obtained from: facility chart records, facility staff, patient report and [facility] chart review.       Today's concern is:     Hepatic encephalopathy (H)  Alcoholic hepatitis with ascites  Recurrent falls  Hospice care patient     Patient seen this am, started hospice on 3/5/19, pleasant, confused, asked if [employer] were playing today, abdomen moderately distended, no pain, recent falls attempting to transfer with mild bruising on R flank, overall status has stabilized from first arrival from hospital, no acute concerns.    Past Medical and Surgical History reviewed in Epic today.    MEDICATIONS:  Current Outpatient Medications   Medication Sig Dispense Refill     blood glucose monitoring (ONETOUCH ULTRA) test strip Use to test blood sugars 4 times daily as needed or as directed. 400 each 1     folic acid (FOLVITE) 1 MG tablet Take 1 tablet (1 mg) by mouth daily 90 tablet 2     furosemide (LASIX) 20 MG tablet Take 1 tablet (20 mg) by mouth daily 30 tablet 3     gabapentin (NEURONTIN) 300 MG capsule Take 300 mg by mouth 3 times daily       hydrocortisone (CORTAID) 0.5 % external cream Apply topically 2 times daily       hydrOXYzine (ATARAX) 10 MG tablet Take 1 tablet (10 mg) by mouth 3 times daily 90 tablet 2     ibuprofen (ADVIL/MOTRIN) 600 MG tablet Take 600 mg by mouth 3 times daily       lactulose (CHRONULAC) 10 GM/15ML solution Take 60 mLs by mouth 3 times daily       lactulose (CHRONULAC) 10 GM/15ML solution Take 15 mLs by mouth daily as needed for constipation       levETIRAcetam (KEPPRA) 500 MG tablet Take 500 mg by mouth 3 times daily       levothyroxine  "(SYNTHROID/LEVOTHROID) 88 MCG tablet Take 1 tablet (88 mcg) by mouth daily 90 tablet 2     melatonin 3 MG tablet Take 3 mg by mouth nightly as needed        methylphenidate (RITALIN) 10 MG tablet Take 10 mg by mouth 2 times daily       midodrine (PROAMATINE) 5 MG tablet Take 15 mg by mouth 3 times daily       mirtazapine (REMERON) 30 MG tablet Take 30 mg by mouth At Bedtime       multivitamin, therapeutic with minerals (THERA-VIT-M) TABS Take 1 tablet by mouth 2 times daily       omeprazole (PRILOSEC) 20 MG CR capsule Take 2 capsules (40 mg) by mouth 2 times daily 360 capsule 3     ONE TOUCH LANCETS MISC by In Vitro route 4 times daily as needed 100 each prn     order for DME Hospital bed 1 Device 0     order for DME Overnight briefs, size M 90 each 11     order for DME Personal wet wipes 10 Package 11     order for DME Equipment being ordered: manual wheelchair and cushion    [employer] light weight Sx5 18x18, 17.5 inch floor height and a 2 inch 18x18 inch cushion    length of need : lifetime        Patient's height : 5/10\", weight : 172 pounds 1 Device 0     POTASSIUM CHLORIDE ER PO Take 20 mEq by mouth daily       rifaximin (XIFAXAN) 550 MG TABS tablet Take 1 tablet (550 mg) by mouth 2 times daily 60 tablet 11     spironolactone (ALDACTONE) 25 MG tablet Take 2 tablets (50 mg) by mouth daily 180 tablet 0     thiamine (VITAMIN B-1) 100 MG tablet Take 1 tablet (100 mg) by mouth daily 100 tablet 1     traZODone (DESYREL) 100 MG tablet Take 1 tablet (100 mg) by mouth At Bedtime 30 tablet 0     Vitamin D, Cholecalciferol, 1000 units TABS Take 1 tablet by mouth daily       Calcium Carb-Cholecalciferol (CALCIUM 500 +D) 500-400 MG-UNIT TABS Take 500 mg by mouth daily 90 tablet 1     ferrous sulfate (IRON) 325 (65 FE) MG tablet Take 1 tablet (325 mg) by mouth 2 times daily 200 tablet 3     lactulose encephalopathy (CHRONULAC) 10 GM/15ML SOLUTION Take 15 mLs by mouth daily as needed for constipation       mirtazapine " (REMERON) 30 MG tablet Take 1 tablet (30 mg) by mouth At Bedtime 30 tablet 0     pravastatin (PRAVACHOL) 80 MG tablet Take 1 tablet (80 mg) by mouth daily 90 tablet 0       REVIEW OF SYSTEMS:  Unobtainable secondary to cognitive impairment.     Objective:  There were no vitals taken for this visit.  Exam:  GENERAL APPEARANCE:  in no distress, thin  ENT:  Mouth and posterior oropharynx normal, moist mucous membranes  RESP:  lungs clear to auscultation   CV:  regular rate and rhythm, no murmur, rub, or gallop, no edema  ABDOMEN:  moderate ascites, bowel sounds normal  M/S:   Gait and station abnormal full assist transfer  SKIN:  Inspection of skin and subcutaneous tissueas in HPI  NEURO:   Examination of sensation by touch normal  PSYCH:  oriented to self, affect and mood normal    Labs:   Labs done in SNF are in [place] EPIC. Please refer to them using EPIC/Care Everywhere.    ASSESSMENT/PLAN:  Hepatic encephalopathy (H)  Alcoholic hepatitis with ascites  -has not had paracentesis for weeks not, ascities has stabilized  -declined all medications this am, just does nto want to take them all, nurse to re-attempt the lactulose at minimum later this am  -status has stabilized, no acute concern  -refilled ritalin today    Recurrent falls  -2 falls this week, poor safety awareness, impulsive  -staff to remind to call for assist and monitor as possible    Hospice care patient  -started with [facility] on 3/5, appreciate their support  -medications have been adjusted appropriately  -hospice to follow up weekly on status          Electronically signed by:  TIESHA Maradiaga CNP

## 2019-04-08 NOTE — PROGRESS NOTES
[facility]  Chief Complaint   Patient presents with     USP Regulatory     [facility] Medical Record Number:  [id number]  Place of Service where encounter took place:  [facility] ([facility] [[id number]]      HPI:    Mono Varghese  is 48 year old (1970), who is being seen today for a federally mandated E/M visit.  HPI information obtained from: facility chart records, facility staff and [facility] Epic chart review. Today's concerns are:     Encephalopathy, hepatic (H)  Alcoholic cirrhosis of liver with ascites (H)  Recurrent falls  Hospice care     Patient seen today in room, recently had paracentesis and caused an abdominal wall hernia, abdomen distended and firm but no pain on moderate palpation, sleeping at night better, moves head now but no verbal response, followed by hospice, appears to have moderate progression of disease, followed by hospice.    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 He also has no past medical  history of Chronic infection.  PAST SURGICAL HISTORY:   has a past surgical history that includes orthopedic surgery; Optical tracking system craniotomy, excise tumor, combined (6/6/2013); Esophagoscopy, gastroscopy, duodenoscopy (EGD), combined (N/A, 11/23/2015); Sigmoidoscopy flexible (N/A, 11/24/2015); Colonoscopy (N/A, 11/27/2015); Esophagoscopy, gastroscopy, duodenoscopy (EGD), combined (N/A, 1/25/2017); Esophagoscopy, gastroscopy, duodenoscopy (EGD), combined (N/A, 3/30/2017); Esophagoscopy, gastroscopy, duodenoscopy (EGD), combined (N/A, 1/19/2018); Sigmoidoscopy flexible (N/A, 1/19/2018); Esophagoscopy, gastroscopy, duodenoscopy (EGD), combined (N/A, 2/12/2018); Esophagoscopy, gastroscopy, duodenoscopy (EGD), combined (N/A, 3/5/2018); Esophagoscopy, gastroscopy, duodenoscopy (EGD), combined (N/A, 4/16/2018); Esophagoscopy, gastroscopy, duodenoscopy (EGD), combined (N/A, 5/30/2018); Endoscopic ultrasound upper gastrointestinal tract (GI) (N/A, 10/1/2018); Sigmoidoscopy flexible (N/A, 10/1/2018); Endoscopic ultrasound lower gastrointestional tract (GI) (N/A, 10/15/2018); Abdominal paracentesis (11/27/2018); IR Thoracentesis (2/14/2019); IR Paracentesis (2/18/2019); and IR Paracentesis (2/13/2019).  FAMILY HISTORY: family history includes Cirrhosis in his father; Medical History Unknown in his brother, brother, brother, and mother. He was adopted.  SOCIAL HISTORY:  reports that he has never smoked. He has never used smokeless tobacco. He reports that he does not drink alcohol or use drugs.    MEDICATIONS:  Current Outpatient Medications   Medication Sig Dispense Refill     blood glucose monitoring (ONETOUCH ULTRA) test strip Use to test blood sugars 4 times daily as needed or as directed. 400 each 1     furosemide (LASIX) 20 MG tablet Take 1 tablet (20 mg) by mouth daily 30 tablet 3     gabapentin (NEURONTIN) 300 MG capsule Take 300 mg by mouth 3 times daily       hydrocortisone (CORTAID) 0.5 % external cream  "Apply topically 2 times daily       hydrOXYzine (ATARAX) 10 MG tablet Take 1 tablet (10 mg) by mouth 3 times daily 90 tablet 2     ibuprofen (ADVIL/MOTRIN) 600 MG tablet Take 600 mg by mouth 3 times daily       lactulose (CHRONULAC) 10 GM/15ML solution Take 60 mLs by mouth 3 times daily       lactulose (CHRONULAC) 10 GM/15ML solution Take 15 mLs by mouth daily as needed for constipation       levETIRAcetam (KEPPRA) 500 MG tablet Take 500 mg by mouth 3 times daily       levothyroxine (SYNTHROID/LEVOTHROID) 88 MCG tablet Take 1 tablet (88 mcg) by mouth daily 90 tablet 2     methylphenidate (RITALIN) 10 MG tablet Take 10 mg by mouth 2 times daily       midodrine (PROAMATINE) 5 MG tablet Take 15 mg by mouth 3 times daily       mirtazapine (REMERON) 30 MG tablet Take 30 mg by mouth At Bedtime       omeprazole (PRILOSEC) 20 MG CR capsule Take 2 capsules (40 mg) by mouth 2 times daily 360 capsule 3     ONE TOUCH LANCETS MISC by In Vitro route 4 times daily as needed 100 each prn     order for DME Hospital bed 1 Device 0     order for DME Overnight briefs, size M 90 each 11     order for DME Personal wet wipes 10 Package 11     order for DME Equipment being ordered: manual wheelchair and cushion    [employer] light weight Sx5 18x18, 17.5 inch floor height and a 2 inch 18x18 inch cushion    length of need : lifetime        Patient's height : 5/10\", weight : 172 pounds 1 Device 0     POTASSIUM CHLORIDE ER PO Take 20 mEq by mouth daily       rifaximin (XIFAXAN) 550 MG TABS tablet Take 1 tablet (550 mg) by mouth 2 times daily 60 tablet 11     spironolactone (ALDACTONE) 25 MG tablet Take 2 tablets (50 mg) by mouth daily 180 tablet 0     thiamine (VITAMIN B-1) 100 MG tablet Take 1 tablet (100 mg) by mouth daily 100 tablet 1     traZODone (DESYREL) 100 MG tablet Take 1 tablet (100 mg) by mouth At Bedtime 30 tablet 0     folic acid (FOLVITE) 1 MG tablet Take 1 tablet (1 mg) by mouth daily 90 tablet 2     melatonin 3 MG tablet Take 3 " "mg by mouth nightly as needed        multivitamin, therapeutic with minerals (THERA-VIT-M) TABS Take 1 tablet by mouth 2 times daily       Vitamin D, Cholecalciferol, 1000 units TABS Take 1 tablet by mouth daily         Case Management:  I have reviewed the care plan and MDS and do agree with the plan. Patient's desire to return to the community is present, but is not able due to care needs . Information reviewed:  Medications, vital signs, orders, and nursing notes.    ROS:  Unobtainable secondary to cognitive impairment.     Vitals:  /80   Pulse 67   Temp 97.4  F (36.3  C)   Resp 20   Ht 1.676 m (5' 6\")   Wt 75.2 kg (165 lb 12.8 oz)   SpO2 96%   BMI 26.76 kg/m    Body mass index is 26.76 kg/m .  Exam:  GENERAL APPEARANCE:  in no distress, thin, somnolent  ENT:  Mouth and posterior oropharynx normal, moist mucous membranes  RESP:  lungs clear to auscultation   CV:  regular rate and rhythm, no murmur, rub, or gallop, no edema  ABDOMEN:  bowel sounds normal  M/S:   Gait and station abnormal unable to stand/ambulate  SKIN:  Inspection of skin and subcutaneous tissue baseline  NEURO:   Examination of sensation by touch normal  PSYCH:  oriented to unknown    Lab/Diagnostic data:   Labs done in SNF are in [place] EPIC. Please refer to them using EPIC/Care Everywhere.    ASSESSMENT/PLAN  Encephalopathy, hepatic (H)  Alcoholic cirrhosis of liver with ascites (H)  Recurrent falls  Hospice care  -disease is progressing, requires full cares, non-verbal today, appears ill but close to baseline since arrival to facility  -decrease trazodone to 100mg at bedtime for insomnia  -paracentesis on 4/5 caused abdominal wall hematoma, no overall ill effects  -continue open order for paracentis at [facility] but thinking they may not tap again due to bleeding risk  -per family if they want to continue xifaxan will order through pharmacy on request after current supply is depleted  -followed by [facility], " appreciate their support, current medication regimen appropriate  -will support patient and family through probable transition      Electronically signed by:  TIESHA Maradiaga CNP

## 2019-04-09 NOTE — TELEPHONE ENCOUNTER
Social Work   Incoming/Outgoing Call  [facility]      Outgoing Call To: Yisel Varghese, wife    Reason for Call:  Follow-up call to assess current needs and to see how patient is doing since hospice services have been secured.    Response/Plan:  Writer spoke to Mrs. Varghese, who endorsed Mono is receiving a lot of support from hospice.  In addition, his family has become more involved.  Mrs. Varghese does not feel she needs any additional assistance at this time.  Writer encouraged her to contact SW Department if she has future questions, concerns or needs.      Will route to patient's current psychiatric provider(s) as an FYI.   Please call or EPIC message with any questions or concerns.    TERRI Jaeger  Social Work Intern  [phone]

## 2019-10-17 NOTE — ED TRIAGE NOTES
Since colonscopy had some coiling during procedure and they were worried about possible infection, has been more weak and confused, has some confusion at baseline but wife states this is worse 283 BG   no

## 2021-01-13 NOTE — PROGRESS NOTES
Calorie Count  Intake recorded for: 10/20  Kcals: 0  Protein: 0g  # Meals Recorded: no intake recorded, 1 meal (only grapes) ordered  # Supplements Recorded: 0   Pt agreed to HIV testing after writer reviewed the HIV antibody test or antigen testing consent form, she signed the form.  Order entered by Dr. López, lab updated, writer will be present, pt allowed lab to draw her blood without incident.  Pt requested another prn anxiolytic - zyprexa zydis given.

## 2021-01-15 NOTE — TELEPHONE ENCOUNTER
[facility] Call Center    Phone Message    May a detailed message be left on voicemail: yes    Reason for Call: Other: Radiology calling to report incidental finding on a recent CT.  She stated results should be in epic and wanted Jody to take a look and see.      Action Taken: Message routed to:  [facility]     PROVIDER:[TOKEN:[[id number]],FOLLOWUP:[2 weeks],ESTABLISHEDPATIENT:[T]]

## 2021-05-20 NOTE — PROGRESS NOTES
Sent mychart to the pt regarding this matter.   Complex Repair And Bilobe Flap Text: The defect edges were debeveled with a #15 scalpel blade.  The primary defect was closed partially with a complex linear closure.  Given the location of the remaining defect, shape of the defect and the proximity to free margins a bilobe flap was deemed most appropriate for complete closure of the defect.  Using a sterile surgical marker, an appropriate advancement flap was drawn incorporating the defect and placing the expected incisions within the relaxed skin tension lines where possible.    The area thus outlined was incised deep to adipose tissue with a #15 scalpel blade.  The skin margins were undermined to an appropriate distance in all directions utilizing iris scissors.

## 2021-11-09 NOTE — PLAN OF CARE
Problem: Patient Care Overview  Goal: Plan of Care/Patient Progress Review  OT 5B  Discharge Planner OT   Patient plan for discharge: home with 24 hour assist  Current status: SBA supine to EOB. SBA sit<>stand. SBA for g/h at the sink. Max A for enrique cares, clothing management and donning bilateral socks. Pt ambulated ~200ft with SBA and FWW.   Barriers to return to prior living situation: acute medical needs  Recommendations for discharge: Home with 24 hours assist and resumption of cares  Rationale for recommendations: Pt is primarily independent with ADL completion and has 24 hour assist available.        Entered by: Rina Whaley 11/06/2018 2:34 PM            no

## 2023-03-14 NOTE — BRIEF OP NOTE
[facility], [place]  Gastroenterology Brief Operative Note     Pre-operative diagnosis: Esophageal varices   Post-operative diagnosis Same   Procedure: EGD   Surgeon: Guru Robin MD   Assistants(s): Ari Giordano MD   Anesthesia: General endotracheal anesthesia   Estimated blood loss: Minimal                          Total IV fluids: (See anesthesia record)   Blood transfusion: No transfusion was given during surgery   Total urine output: (See anesthesia record)   Drains: None   Specimens: None   Implants: 6 bands   Findings: Post-esophageal banding scars seen in the esophagus. Some esophageal varices remained. Portal hypertensive gastropathy.  Normal duodenum. No ectopic varices.  Esophageal varices treated with band ligation. 6 bands placed.   Complications: None   Condition: Stable   Comments:        Recommendations:          See dictated procedure report for full details (found in chart review under 'Procedures')     - Observe in Same Day for possible discharge  - Discharge home if minimal or no pain   - Liquid diet today. Advance to soft diet tomorrow and day after. Can resume regular diet in 3 days.  - Repeat EGD in OR in 4-6 weeks   Ari Giordano MD  [phone]       Nsaids Counseling: NSAID Counseling: I discussed with the patient that NSAIDs should be taken with food. Prolonged use of NSAIDs can result in the development of stomach ulcers.  Patient advised to stop taking NSAIDs if abdominal pain occurs.  The patient verbalized understanding of the proper use and possible adverse effects of NSAIDs.  All of the patient's questions and concerns were addressed.

## 2023-04-17 NOTE — TELEPHONE ENCOUNTER
I called Patient to schedule an appointment for ER follow up left wrist. No answer and voicemail is full. Sravan Artis NP will see patient today if she calls back. Provider Note:    Talked to patient and his wife regarding his antidepressant medication.  Patient and his wife do not feel Lexapro has been effective in treating his depression.  Pt is also reporting ongoing difficulty with sleep.  Discussed with patient and his wife our recommendation to taper off Lexapro and start a trial of mirtazapine to target depression, anxiety, and sleep disturbances.  R/B of mirtazapine discussed, including possible weight gain. They agreed to the plan.    Plan:  - Decrease Lexapro to 10 mg daily for 1 week, THEN discontinue  - Start mirtazapine 7.5 mg QHS for 1 week, THEN increase to 15 mg QHS    Kwesi Pena MD  PGY-3

## 2023-05-24 NOTE — PROGRESS NOTES
PT port de accessed with heparin and saline flushed prior Kristen Severino    
PAST MEDICAL HISTORY:  Anxiety     Dementia     Kidney stone     MI (myocardial infarction)

## 2024-02-02 NOTE — TELEPHONE ENCOUNTER
Yisel is informed that Mono is scheduled on 05/30/2018 at 1210 PM with an arrival time of 1010 AM for an EGD procedure with Dr. Howard.   Yisel instructed that Mono will need a pre-op physical prior to his procedure, a  to pick him up post procedure and someone to monitor him for at least 24 hours post.  All instructions along with the pre-op form will be mailed to pt at his address listed in EPIC, it was confirmed during this call to be current.    SR 04/18/2018 @ 957 A   
Results in MD note

## 2024-03-13 NOTE — LETTER
Medication:   Requested Prescriptions     Pending Prescriptions Disp Refills    omeprazole (PRILOSEC) 40 MG delayed release capsule 90 capsule 1     Sig: Take 1 capsule by mouth every morning (before breakfast)        Last Filled:  9/25/2023    Patient Phone Number: [phone] (home) [phone] (work)    Last appt: 8/25/2023   Next appt: 4/5/2024    Last OARRS:        No data to display                   Transition Communication Hand-off for Care Transitions to Next Level of Care Provider    Name: Mono Varghese  : 1970  MRN #: [id number]  Primary Care Provider: [facility]     Primary Clinic: [address] [place] [address]  [facility] [id number]     Reason for Hospitalization:  Hepatic encephalopathy (H) [K72.90]  Admit Date/Time: 2018  5:44 PM  Discharge Date: 18   Payor Source: Payor: MEDICARE / Plan: MEDICARE / Product Type: Medicare / [employer] [place]    Readmission Assessment Measure (URVASHI) Risk Score/category: very high    Plan is to return to his home with his mother in law (retired NP) providing care after rehab stay. Contact for  on Saturday and  is pager [phone]. For nursing questions please call 5B at [phone]. Thank you!

## 2024-05-31 NOTE — ED TRIAGE NOTES
GC/Chlamydia results still pending but running. Call back team to continue to follow and will discuss results with patient Pt biba from home after a fall. As per EMS, pt was walking without using a walker and fell. Denies LOC and or hitting head. Pt has hx of stg4 brain Ca and liver CA and has generalized weakness. VSS with low grade temp of 99.5 orally. Pt also has some bleeding from the rectal varices. Denies any pain or discomfort. B for EMS

## 2024-12-03 NOTE — BRIEF OP NOTE
[employer] Brief Operative Note    Pre-operative diagnosis: Bleeding Esophageal Varcies    Post-operative diagnosis * No post-op diagnosis entered *   Procedure: Procedure(s):  upper endoscopy with banding of esophageal varices. *latex Allergy* - Wound Class: II-Clean Contaminated   Surgeon: Guru Robin MD       Estimated blood loss: None    Specimens: None   Findings: EGD          - Grade II esophageal varices. 6 Bands placed. Post                        banding scarring was seen                        - Portal hypertensive gastropathy.                        - No gastric varices seen.                        - Normal examined duodenum.   Recommendation:      - Discharge patient to home (ambulatory).                        - Repeat upper endoscopy in 4-6 weeks in the OR (based                        on his comorbidities) for variceal surveillance. Will                        increase the surveillance periods after complete                        eradication of varices    Medical Necessity Information: It is in your best interest to select a reason for this procedure from the list below. All of these items fulfill various CMS LCD requirements except lesion extends to a margin. Include Z78.9 (Other Specified Conditions Influencing Health Status) As An Associated Diagnosis?: No Medical Necessity Clause: This procedure was medically necessary because the lesion that was treated was: Lab: [id number] Lab Facility: [id number] Biopsy Photograph Reviewed: Yes Size Of Lesion In Cm: 3.5 X Size Of Lesion In Cm (Optional): 1.5 Size Of Margin In Cm: 0.2 Anesthesia Volume In Cc: 0 Eye Clamp Note Details: An eye clamp was used during the procedure. Excision Method: Fusiform Saucerization Depth: dermis and superficial adipose tissue Repair Type: Intermediate Suturegard Retention Suture: 2-0 Nylon Retention Suture Bite Size: 3 mm Length To Time In Minutes Device Was In Place: 10 Number Of Hemigard Strips Per Side: 1 Undermining Type: Entire Wound Debridement Text: The wound edges were debrided prior to proceeding with the closure to facilitate wound healing. Helical Rim Text: The closure involved the helical rim. Vermilion Border Text: The closure involved the vermilion border. Nostril Rim Text: The closure involved the nostril rim. Retention Suture Text: Retention sutures were placed to support the closure and prevent dehiscence. Suture Removal: 14 days Epidermal Closure Graft Donor Site (Optional): simple interrupted Graft Donor Site Bandage (Optional-Leave Blank If You Don't Want In Note): Steri-strips and a pressure bandage were applied to the donor site. Detail Level: Detailed Excision Depth: adipose tissue Scalpel Size: 15 blade Anesthesia Type: 1% lidocaine with epinephrine Additional Anesthesia Volume In Cc: 6 Hemostasis: Electrocautery Estimated Blood Loss (Cc): minimal Repair Depth: use same depth as excision depth Deep Sutures: 3-0 Vicryl Epidermal Sutures: 4-0 Ethilon Wound Care: Petrolatum Dressing: dry sterile dressing Suturegard Intro: Intraoperative tissue expansion was performed, utilizing the SUTUREGARD device, in order to reduce wound tension. Suturegard Body: The suture ends were repeatedly re-tightened and re-clamped to achieve the desired tissue expansion. Hemigard Intro: Due to skin fragility and wound tension, it was decided to use HEMIGARD adhesive retention suture devices to permit a linear closure. The skin was cleaned and dried for a 6cm distance away from the wound. Excessive hair, if present, was removed to allow for adhesion. Hemigard Postcare Instructions: The HEMIGARD strips are to remain completely dry for at least 5-7 days. Positioning (Leave Blank If You Do Not Want): The patient was placed in a comfortable position exposing the surgical site. Complex Repair Preamble Text (Leave Blank If You Do Not Want): Extensive wide undermining was performed. Intermediate Repair Preamble Text (Leave Blank If You Do Not Want): Undermining was performed with blunt dissection. Fusiform Excision Additional Text (Leave Blank If You Do Not Want): The margin was drawn around the clinically apparent lesion.  A fusiform shape was then drawn on the skin incorporating the lesion and margins.  Incisions were then made along these lines to the appropriate tissue plane and the lesion was extirpated. Eliptical Excision Additional Text (Leave Blank If You Do Not Want): The margin was drawn around the clinically apparent lesion.  An elliptical shape was then drawn on the skin incorporating the lesion and margins.  Incisions were then made along these lines to the appropriate tissue plane and the lesion was extirpated. Saucerization Excision Additional Text (Leave Blank If You Do Not Want): The margin was drawn around the clinically apparent lesion.  Incisions were then made along these lines, in a tangential fashion, to the appropriate tissue plane and the lesion was extirpated. Slit Excision Additional Text (Leave Blank If You Do Not Want): A linear line was drawn on the skin overlying the lesion. An incision was made slowly until the lesion was visualized.  Once visualized, the lesion was removed with blunt dissection. Excisional Biopsy Additional Text (Leave Blank If You Do Not Want): The margin was drawn around the clinically apparent lesion. An elliptical shape was then drawn on the skin incorporating the lesion and margins.  Incisions were then made along these lines to the appropriate tissue plane and the lesion was extirpated. Perilesional Excision Additional Text (Leave Blank If You Do Not Want): The margin was drawn around the clinically apparent lesion. Incisions were then made along these lines to the appropriate tissue plane and the lesion was extirpated. Repair Performed By Another Provider Text (Leave Blank If You Do Not Want): After the tissue was excised the defect was repaired by another provider. No Repair - Repaired With Adjacent Surgical Defect Text (Leave Blank If You Do Not Want): After the excision the defect was repaired concurrently with another surgical defect which was in close approximation. Adjacent Tissue Transfer Text: The defect edges were debeveled with a #15 scalpel blade. Given the location of the defect and the proximity to free margins an adjacent tissue transfer was deemed most appropriate. Using a sterile surgical marker, an appropriate flap was drawn incorporating the defect and placing the expected incisions within the relaxed skin tension lines where possible. The area thus outlined was incised deep to adipose tissue with a #15 scalpel blade. The skin margins were undermined to an appropriate distance in all directions utilizing iris scissors and carried over to close the primary defect. Advancement Flap (Single) Text: The defect edges were debeveled with a #15 scalpel blade. Given the location of the defect and the proximity to free margins a single advancement flap was deemed most appropriate. Using a sterile surgical marker, an appropriate advancement flap was drawn incorporating the defect and placing the expected incisions within the relaxed skin tension lines where possible. The area thus outlined was incised deep to adipose tissue with a #15 scalpel blade. The skin margins were undermined to an appropriate distance in all directions utilizing iris scissors. Following this, the designed flap was advanced and carried over into the primary defect and sutured into place. Advancement Flap (Double) Text: The defect edges were debeveled with a #15 scalpel blade. Given the location of the defect and the proximity to free margins a double advancement flap was deemed most appropriate. Using a sterile surgical marker, the appropriate advancement flaps were drawn incorporating the defect and placing the expected incisions within the relaxed skin tension lines where possible. The area thus outlined was incised deep to adipose tissue with a #15 scalpel blade. The skin margins were undermined to an appropriate distance in all directions utilizing iris scissors. Following this, the designed flaps were advanced and carried over into the primary defect and sutured into place. Burow's Advancement Flap Text: The defect edges were debeveled with a #15 scalpel blade. Given the location of the defect and the proximity to free margins a Burow's advancement flap was deemed most appropriate. Using a sterile surgical marker, the appropriate advancement flap was drawn incorporating the defect and placing the expected incisions within the relaxed skin tension lines where possible. The area thus outlined was incised deep to adipose tissue with a #15 scalpel blade. The skin margins were undermined to an appropriate distance in all directions utilizing iris scissors. Following this, the designed flap was advanced and carried over into the primary defect and sutured into place. Chonodrocutaneous Helical Advancement Flap Text: The defect edges were debeveled with a #15 scalpel blade. Given the location of the defect and the proximity to free margins a chondrocutaneous helical advancement flap was deemed most appropriate. Using a sterile surgical marker, the appropriate advancement flap was drawn incorporating the defect and placing the expected incisions within the relaxed skin tension lines where possible. The area thus outlined was incised deep to adipose tissue with a #15 scalpel blade. The skin margins were undermined to an appropriate distance in all directions utilizing iris scissors. Following this, the designed flap was advanced and carried over into the primary defect and sutured into place. Crescentic Advancement Flap Text: The defect edges were debeveled with a #15 scalpel blade. Given the location of the defect and the proximity to free margins a crescentic advancement flap was deemed most appropriate. Using a sterile surgical marker, the appropriate advancement flap was drawn incorporating the defect and placing the expected incisions within the relaxed skin tension lines where possible. The area thus outlined was incised deep to adipose tissue with a #15 scalpel blade. The skin margins were undermined to an appropriate distance in all directions utilizing iris scissors. Following this, the designed flap was advanced and carried over into the primary defect and sutured into place. A-T Advancement Flap Text: The defect edges were debeveled with a #15 scalpel blade. Given the location of the defect, shape of the defect and the proximity to free margins an A-T advancement flap was deemed most appropriate. Using a sterile surgical marker, an appropriate advancement flap was drawn incorporating the defect and placing the expected incisions within the relaxed skin tension lines where possible. The area thus outlined was incised deep to adipose tissue with a #15 scalpel blade. The skin margins were undermined to an appropriate distance in all directions utilizing iris scissors. Following this, the designed flap was advanced and carried over into the primary defect and sutured into place. O-T Advancement Flap Text: The defect edges were debeveled with a #15 scalpel blade. Given the location of the defect, shape of the defect and the proximity to free margins an O-T advancement flap was deemed most appropriate. Using a sterile surgical marker, an appropriate advancement flap was drawn incorporating the defect and placing the expected incisions within the relaxed skin tension lines where possible. The area thus outlined was incised deep to adipose tissue with a #15 scalpel blade. The skin margins were undermined to an appropriate distance in all directions utilizing iris scissors. Following this, the designed flap was advanced and carried over into the primary defect and sutured into place. O-L Flap Text: The defect edges were debeveled with a #15 scalpel blade. Given the location of the defect, shape of the defect and the proximity to free margins an O-L flap was deemed most appropriate. Using a sterile surgical marker, an appropriate advancement flap was drawn incorporating the defect and placing the expected incisions within the relaxed skin tension lines where possible. The area thus outlined was incised deep to adipose tissue with a #15 scalpel blade. The skin margins were undermined to an appropriate distance in all directions utilizing iris scissors. Following this, the designed flap was advanced and carried over into the primary defect and sutured into place. O-Z Flap Text: The defect edges were debeveled with a #15 scalpel blade. Given the location of the defect, shape of the defect and the proximity to free margins an O-Z flap was deemed most appropriate. Using a sterile surgical marker, an appropriate transposition flap was drawn incorporating the defect and placing the expected incisions within the relaxed skin tension lines where possible. The area thus outlined was incised deep to adipose tissue with a #15 scalpel blade. The skin margins were undermined to an appropriate distance in all directions utilizing iris scissors. Following this, the designed flap was carried over into the primary defect and sutured into place. Double O-Z Flap Text: The defect edges were debeveled with a #15 scalpel blade. Given the location of the defect, shape of the defect and the proximity to free margins a Double O-Z flap was deemed most appropriate. Using a sterile surgical marker, an appropriate transposition flap was drawn incorporating the defect and placing the expected incisions within the relaxed skin tension lines where possible. The area thus outlined was incised deep to adipose tissue with a #15 scalpel blade. The skin margins were undermined to an appropriate distance in all directions utilizing iris scissors. Following this, the designed flap was carried over into the primary defect and sutured into place. V-Y Flap Text: The defect edges were debeveled with a #15 scalpel blade. Given the location of the defect, shape of the defect and the proximity to free margins a V-Y flap was deemed most appropriate. Using a sterile surgical marker, an appropriate advancement flap was drawn incorporating the defect and placing the expected incisions within the relaxed skin tension lines where possible. The area thus outlined was incised deep to adipose tissue with a #15 scalpel blade. The skin margins were undermined to an appropriate distance in all directions utilizing iris scissors. Following this, the designed flap was advanced and carried over into the primary defect and sutured into place. Advancement-Rotation Flap Text: The defect edges were debeveled with a #15 scalpel blade. Given the location of the defect, shape of the defect and the proximity to free margins an advancement-rotation flap was deemed most appropriate. Using a sterile surgical marker, an appropriate flap was drawn incorporating the defect and placing the expected incisions within the relaxed skin tension lines where possible. The area thus outlined was incised deep to adipose tissue with a #15 scalpel blade. The skin margins were undermined to an appropriate distance in all directions utilizing iris scissors. Following this, the designed flap was carried over into the primary defect and sutured into place. Mercedes Flap Text: The defect edges were debeveled with a #15 scalpel blade. Given the location of the defect, shape of the defect and the proximity to free margins a Mercedes flap was deemed most appropriate. Using a sterile surgical marker, an appropriate advancement flap was drawn incorporating the defect and placing the expected incisions within the relaxed skin tension lines where possible. The area thus outlined was incised deep to adipose tissue with a #15 scalpel blade. The skin margins were undermined to an appropriate distance in all directions utilizing iris scissors. Following this, the designed flap was advanced and carried over into the primary defect and sutured into place. Modified Advancement Flap Text: The defect edges were debeveled with a #15 scalpel blade. Given the location of the defect, shape of the defect and the proximity to free margins a modified advancement flap was deemed most appropriate. Using a sterile surgical marker, an appropriate advancement flap was drawn incorporating the defect and placing the expected incisions within the relaxed skin tension lines where possible. The area thus outlined was incised deep to adipose tissue with a #15 scalpel blade. The skin margins were undermined to an appropriate distance in all directions utilizing iris scissors. Following this, the designed flap was advanced and carried over into the primary defect and sutured into place. Mucosal Advancement Flap Text: Given the location of the defect, shape of the defect and the proximity to free margins a mucosal advancement flap was deemed most appropriate. Incisions were made with a 15 blade scalpel in the appropriate fashion along the cutaneous vermilion border and the mucosal lip. The remaining actinically damaged mucosal tissue was excised.  The mucosal advancement flap was then elevated to the gingival sulcus with care taken to preserve the neurovascular structures and advanced into the primary defect. Care was taken to ensure that precise realignment of the vermilion border was achieved. Peng Advancement Flap Text: The defect edges were debeveled with a #15 scalpel blade. Given the location of the defect, shape of the defect and the proximity to free margins a Peng advancement flap was deemed most appropriate. Using a sterile surgical marker, an appropriate advancement flap was drawn incorporating the defect and placing the expected incisions within the relaxed skin tension lines where possible. The area thus outlined was incised deep to adipose tissue with a #15 scalpel blade. The skin margins were undermined to an appropriate distance in all directions utilizing iris scissors. Following this, the designed flap was advanced and carried over into the primary defect and sutured into place. Hatchet Flap Text: The defect edges were debeveled with a #15 scalpel blade. Given the location of the defect, shape of the defect and the proximity to free margins a hatchet flap was deemed most appropriate. Using a sterile surgical marker, an appropriate hatchet flap was drawn incorporating the defect and placing the expected incisions within the relaxed skin tension lines where possible. The area thus outlined was incised deep to adipose tissue with a #15 scalpel blade. The skin margins were undermined to an appropriate distance in all directions utilizing iris scissors. Following this, the designed flap was carried over into the primary defect and sutured into place. Rotation Flap Text: The defect edges were debeveled with a #15 scalpel blade. Given the location of the defect, shape of the defect and the proximity to free margins a rotation flap was deemed most appropriate. Using a sterile surgical marker, an appropriate rotation flap was drawn incorporating the defect and placing the expected incisions within the relaxed skin tension lines where possible. The area thus outlined was incised deep to adipose tissue with a #15 scalpel blade. The skin margins were undermined to an appropriate distance in all directions utilizing iris scissors. Following this, the designed flap was carried over into the primary defect and sutured into place. Bilateral Rotation Flap Text: The defect edges were debeveled with a #15 scalpel blade. Given the location of the defect, shape of the defect and the proximity to free margins a bilateral rotation flap was deemed most appropriate. Using a sterile surgical marker, an appropriate rotation flap was drawn incorporating the defect and placing the expected incisions within the relaxed skin tension lines where possible. The area thus outlined was incised deep to adipose tissue with a #15 scalpel blade. The skin margins were undermined to an appropriate distance in all directions utilizing iris scissors. Following this, the designed flap was carried over into the primary defect and sutured into place. Spiral Flap Text: The defect edges were debeveled with a #15 scalpel blade. Given the location of the defect, shape of the defect and the proximity to free margins a spiral flap was deemed most appropriate. Using a sterile surgical marker, an appropriate rotation flap was drawn incorporating the defect and placing the expected incisions within the relaxed skin tension lines where possible. The area thus outlined was incised deep to adipose tissue with a #15 scalpel blade. The skin margins were undermined to an appropriate distance in all directions utilizing iris scissors. Following this, the designed flap was carried over into the primary defect and sutured into place. Staged Advancement Flap Text: The defect edges were debeveled with a #15 scalpel blade. Given the location of the defect, shape of the defect and the proximity to free margins a staged advancement flap was deemed most appropriate. Using a sterile surgical marker, an appropriate advancement flap was drawn incorporating the defect and placing the expected incisions within the relaxed skin tension lines where possible. The area thus outlined was incised deep to adipose tissue with a #15 scalpel blade. The skin margins were undermined to an appropriate distance in all directions utilizing iris scissors. Following this, the designed flap was carried over into the primary defect and sutured into place. Star Wedge Flap Text: The defect edges were debeveled with a #15 scalpel blade. Given the location of the defect, shape of the defect and the proximity to free margins a star wedge flap was deemed most appropriate. Using a sterile surgical marker, an appropriate rotation flap was drawn incorporating the defect and placing the expected incisions within the relaxed skin tension lines where possible. The area thus outlined was incised deep to adipose tissue with a #15 scalpel blade. The skin margins were undermined to an appropriate distance in all directions utilizing iris scissors. Following this, the designed flap was carried over into the primary defect and sutured into place. Transposition Flap Text: The defect edges were debeveled with a #15 scalpel blade. Given the location of the defect and the proximity to free margins a transposition flap was deemed most appropriate. Using a sterile surgical marker, an appropriate transposition flap was drawn incorporating the defect. The area thus outlined was incised deep to adipose tissue with a #15 scalpel blade. The skin margins were undermined to an appropriate distance in all directions utilizing iris scissors. Following this, the designed flap was carried over into the primary defect and sutured into place. Muscle Hinge Flap Text: The defect edges were debeveled with a #15 scalpel blade.  Given the size, depth and location of the defect and the proximity to free margins a muscle hinge flap was deemed most appropriate. Using a sterile surgical marker, an appropriate hinge flap was drawn incorporating the defect. The area thus outlined was incised with a #15 scalpel blade. The skin margins were undermined to an appropriate distance in all directions utilizing iris scissors. Following this, the designed flap was carried into the primary defect and sutured into place. Mustarde Flap Text: The defect edges were debeveled with a #15 scalpel blade.  Given the size, depth and location of the defect and the proximity to free margins a Mustarde flap was deemed most appropriate. Using a sterile surgical marker, an appropriate flap was drawn incorporating the defect. The area thus outlined was incised with a #15 scalpel blade. The skin margins were undermined to an appropriate distance in all directions utilizing iris scissors. Following this, the designed flap was carried into the primary defect and sutured into place. Nasal Turnover Hinge Flap Text: The defect edges were debeveled with a #15 scalpel blade.  Given the size, depth, location of the defect and the defect being full thickness a nasal turnover hinge flap was deemed most appropriate. Using a sterile surgical marker, an appropriate hinge flap was drawn incorporating the defect. The area thus outlined was incised with a #15 scalpel blade. The flap was designed to recreate the nasal mucosal lining and the alar rim. The skin margins were undermined to an appropriate distance in all directions utilizing iris scissors. Following this, the designed flap was carried over into the primary defect and sutured into place Nasalis-Muscle-Based Myocutaneous Island Pedicle Flap Text: Using a #15 blade, an incision was made around the donor flap to the level of the nasalis muscle. Wide lateral undermining was then performed in both the subcutaneous plane above the nasalis muscle, and in a submuscular plane just above periosteum. This allowed the formation of a free nasalis muscle axial pedicle (based on the angular artery) which was still attached to the actual cutaneous flap, increasing its mobility and vascular viability. Hemostasis was obtained with pinpoint electrocoagulation. The flap was mobilized into position and the pivotal anchor points positioned and stabilized with buried interrupted sutures. Subcutaneous and dermal tissues were closed in a multilayered fashion with sutures. Tissue redundancies were excised, and the epidermal edges were apposed without significant tension and sutured with sutures. Nasalis Myocutaneous Flap Text: Using a #15 blade, an incision was made around the donor flap to the level of the nasalis muscle. Wide lateral undermining was then performed in both the subcutaneous plane above the nasalis muscle, and in a submuscular plane just above periosteum. This allowed the formation of a free nasalis muscle axial pedicle which was still attached to the actual cutaneous flap, increasing its mobility and vascular viability. Hemostasis was obtained with pinpoint electrocoagulation. The flap was mobilized into position and the pivotal anchor points positioned and stabilized with buried interrupted sutures. Subcutaneous and dermal tissues were closed in a multilayered fashion with sutures. Tissue redundancies were excised, and the epidermal edges were apposed without significant tension and sutured with sutures. Nasolabial Transposition Flap Text: The defect edges were debeveled with a #15 scalpel blade.  Given the size, depth and location of the defect and the proximity to free margins a nasolabial transposition flap was deemed most appropriate. Using a sterile surgical marker, an appropriate flap was drawn incorporating the defect. The area thus outlined was incised with a #15 scalpel blade. The skin margins were undermined to an appropriate distance in all directions utilizing iris scissors. Following this, the designed flap was carried into the primary defect and sutured into place. Orbicularis Oris Muscle Flap Text: The defect edges were debeveled with a #15 scalpel blade.  Given that the defect affected the competency of the oral sphincter an orbicularis oris muscle flap was deemed most appropriate to restore this competency and normal muscle function.  Using a sterile surgical marker, an appropriate flap was drawn incorporating the defect. The area thus outlined was incised with a #15 scalpel blade. Following this, the designed flap was carried over into the primary defect and sutured into place. Melolabial Transposition Flap Text: The defect edges were debeveled with a #15 scalpel blade. Given the location of the defect and the proximity to free margins a melolabial flap was deemed most appropriate. Using a sterile surgical marker, an appropriate melolabial transposition flap was drawn incorporating the defect. The area thus outlined was incised deep to adipose tissue with a #15 scalpel blade. The skin margins were undermined to an appropriate distance in all directions utilizing iris scissors. Following this, the designed flap was carried over into the primary defect and sutured into place. Rectangular Flap Text: The defect edges were debeveled with a #15 scalpel blade. Given the location of the defect and the proximity to free margins a rectangular flap was deemed most appropriate. Using a sterile surgical marker, an appropriate rectangular flap was drawn incorporating the defect. The area thus outlined was incised deep to adipose tissue with a #15 scalpel blade. The skin margins were undermined to an appropriate distance in all directions utilizing iris scissors. Following this, the designed flap was carried over into the primary defect and sutured into place. Rhombic Flap Text: The defect edges were debeveled with a #15 scalpel blade. Given the location of the defect and the proximity to free margins a rhombic flap was deemed most appropriate. Using a sterile surgical marker, an appropriate rhombic flap was drawn incorporating the defect. The area thus outlined was incised deep to adipose tissue with a #15 scalpel blade. The skin margins were undermined to an appropriate distance in all directions utilizing iris scissors. Following this, the designed flap was carried over into the primary defect and sutured into place. Rhomboid Transposition Flap Text: The defect edges were debeveled with a #15 scalpel blade. Given the location of the defect and the proximity to free margins a rhomboid transposition flap was deemed most appropriate. Using a sterile surgical marker, an appropriate rhomboid flap was drawn incorporating the defect. The area thus outlined was incised deep to adipose tissue with a #15 scalpel blade. The skin margins were undermined to an appropriate distance in all directions utilizing iris scissors. Following this, the designed flap was carried over into the primary defect and sutured into place. Bi-Rhombic Flap Text: The defect edges were debeveled with a #15 scalpel blade. Given the location of the defect and the proximity to free margins a bi-rhombic flap was deemed most appropriate. Using a sterile surgical marker, an appropriate rhombic flap was drawn incorporating the defect. The area thus outlined was incised deep to adipose tissue with a #15 scalpel blade. The skin margins were undermined to an appropriate distance in all directions utilizing iris scissors. Following this, the designed flap was carried over into the primary defect and sutured into place. Helical Rim Advancement Flap Text: The defect edges were debeveled with a #15 blade scalpel.  Given the location of the defect and the proximity to free margins (helical rim) a double helical rim advancement flap was deemed most appropriate. Using a sterile surgical marker, the appropriate advancement flaps were drawn incorporating the defect and placing the expected incisions between the helical rim and antihelix where possible.  The area thus outlined was incised through and through with a #15 scalpel blade.  With a skin hook and iris scissors, the flaps were gently and sharply undermined and freed up. Folllowing this, the designed flaps were carried over into the primary defect and sutured into place. Bilateral Helical Rim Advancement Flap Text: The defect edges were debeveled with a #15 blade scalpel.  Given the location of the defect and the proximity to free margins (helical rim) a bilateral helical rim advancement flap was deemed most appropriate. Using a sterile surgical marker, the appropriate advancement flaps were drawn incorporating the defect and placing the expected incisions between the helical rim and antihelix where possible.  The area thus outlined was incised through and through with a #15 scalpel blade.  With a skin hook and iris scissors, the flaps were gently and sharply undermined and freed up. Following this, the designed flaps were placed into the primary defect and sutured into place. Ear Star Wedge Flap Text: The defect edges were debeveled with a #15 blade scalpel.  Given the location of the defect and the proximity to free margins (helical rim) an ear star wedge flap was deemed most appropriate. Using a sterile surgical marker, the appropriate flap was drawn incorporating the defect and placing the expected incisions between the helical rim and antihelix where possible.  The area thus outlined was incised through and through with a #15 scalpel blade. Following this, the designed flap was carried over into the primary defect and sutured into place. Flip-Flop Flap Text: The defect edges were debeveled with a #15 blade scalpel.  Given the location of the defect and the proximity to free margins a flip-flop flap was deemed most appropriate. Using a sterile surgical marker, the appropriate flap was drawn incorporating the defect and placing the expected incisions between the helical rim and antihelix where possible.  The area thus outlined was incised through and through with a #15 scalpel blade. Following this, the designed flap was carried over into the primary defect and sutured into place. Banner Transposition Flap Text: The defect edges were debeveled with a #15 scalpel blade. Given the location of the defect and the proximity to free margins a Banner transposition flap was deemed most appropriate. Using a sterile surgical marker, an appropriate flap was drawn around the defect. The area thus outlined was incised deep to adipose tissue with a #15 scalpel blade. The skin margins were undermined to an appropriate distance in all directions utilizing iris scissors. Following this, the designed flap was carried into the primary defect and sutured into place. Bilobed Flap Text: The defect edges were debeveled with a #15 scalpel blade. Given the location of the defect and the proximity to free margins a bilobe flap was deemed most appropriate. Using a sterile surgical marker, an appropriate bilobe flap drawn around the defect. The area thus outlined was incised deep to adipose tissue with a #15 scalpel blade. The skin margins were undermined to an appropriate distance in all directions utilizing iris scissors. Following this, the designed flap was carried over into the primary defect and sutured into place. Bilobed Transposition Flap Text: The defect edges were debeveled with a #15 scalpel blade. Given the location of the defect and the proximity to free margins a bilobed transposition flap was deemed most appropriate. Using a sterile surgical marker, an appropriate bilobe flap drawn around the defect. The area thus outlined was incised deep to adipose tissue with a #15 scalpel blade. The skin margins were undermined to an appropriate distance in all directions utilizing iris scissors. Following this, the designed flap was carried over into the primary defect and sutured into place. Trilobed Flap Text: The defect edges were debeveled with a #15 scalpel blade. Given the location of the defect and the proximity to free margins a trilobed flap was deemed most appropriate. Using a sterile surgical marker, an appropriate trilobed flap was drawn around the defect. The area thus outlined was incised deep to adipose tissue with a #15 scalpel blade. The skin margins were undermined to an appropriate distance in all directions utilizing iris scissors. Following this, the designed flap was carried into the primary defect and sutured into place. Dorsal Nasal Flap Text: The defect edges were debeveled with a #15 scalpel blade. Given the location of the defect and the proximity to free margins a dorsal nasal flap was deemed most appropriate. Using a sterile surgical marker, an appropriate dorsal nasal flap was drawn around the defect. The area thus outlined was incised deep to adipose tissue with a #15 scalpel blade. The skin margins were undermined to an appropriate distance in all directions utilizing iris scissors. Following this, the designed flap was carried into the primary defect and sutured into place. Island Pedicle Flap Text: The defect edges were debeveled with a #15 scalpel blade. Given the location of the defect, shape of the defect and the proximity to free margins an island pedicle advancement flap was deemed most appropriate. Using a sterile surgical marker, an appropriate advancement flap was drawn incorporating the defect, outlining the appropriate donor tissue and placing the expected incisions within the relaxed skin tension lines where possible. The area thus outlined was incised deep to adipose tissue with a #15 scalpel blade. The skin margins were undermined to an appropriate distance in all directions around the primary defect and laterally outward around the island pedicle utilizing iris scissors.  There was minimal undermining beneath the pedicle flap. Following this, the flap was carried over into the primary defect and sutured into place. Island Pedicle Flap With Canthal Suspension Text: The defect edges were debeveled with a #15 scalpel blade. Given the location of the defect, shape of the defect and the proximity to free margins an island pedicle advancement flap was deemed most appropriate. Using a sterile surgical marker, an appropriate advancement flap was drawn incorporating the defect, outlining the appropriate donor tissue and placing the expected incisions within the relaxed skin tension lines where possible. The area thus outlined was incised deep to adipose tissue with a #15 scalpel blade. The skin margins were undermined to an appropriate distance in all directions around the primary defect and laterally outward around the island pedicle utilizing iris scissors.  There was minimal undermining beneath the pedicle flap. A suspension suture was placed in the canthal tendon to prevent tension and prevent ectropion. Following this, the designed flap was placed into the primary defect and sutured into place. Alar Island Pedicle Flap Text: The defect edges were debeveled with a #15 scalpel blade. Given the location of the defect, shape of the defect and the proximity to the alar rim an island pedicle advancement flap was deemed most appropriate. Using a sterile surgical marker, an appropriate advancement flap was drawn incorporating the defect, outlining the appropriate donor tissue and placing the expected incisions within the nasal ala running parallel to the alar rim. The area thus outlined was incised with a #15 scalpel blade. The skin margins were undermined minimally to an appropriate distance in all directions around the primary defect and laterally outward around the island pedicle utilizing iris scissors.  There was minimal undermining beneath the pedicle flap. Following this, the designed flap was carried over into the primary defect and sutured into place. Double Island Pedicle Flap Text: The defect edges were debeveled with a #15 scalpel blade. Given the location of the defect, shape of the defect and the proximity to free margins a double island pedicle advancement flap was deemed most appropriate. Using a sterile surgical marker, an appropriate advancement flap was drawn incorporating the defect, outlining the appropriate donor tissue and placing the expected incisions within the relaxed skin tension lines where possible. The area thus outlined was incised deep to adipose tissue with a #15 scalpel blade. The skin margins were undermined to an appropriate distance in all directions around the primary defect and laterally outward around the island pedicle utilizing iris scissors.  There was minimal undermining beneath the pedicle flap. Following this, the flap was carried over into the primary defect and sutured into place. Island Pedicle Flap-Requiring Vessel Identification Text: The defect edges were debeveled with a #15 scalpel blade. Given the location of the defect, shape of the defect and the proximity to free margins an island pedicle advancement flap was deemed most appropriate. Using a sterile surgical marker, an appropriate advancement flap was drawn, based on the axial vessel mentioned above, incorporating the defect, outlining the appropriate donor tissue and placing the expected incisions within the relaxed skin tension lines where possible. The area thus outlined was incised deep to adipose tissue with a #15 scalpel blade. The skin margins were undermined to an appropriate distance in all directions around the primary defect and laterally outward around the island pedicle utilizing iris scissors.  There was minimal undermining beneath the pedicle flap. Following this, the designed flap was carried over into the primary defect and sutured into place. Keystone Flap Text: The defect edges were debeveled with a #15 scalpel blade. Given the location of the defect, shape of the defect a keystone flap was deemed most appropriate. Using a sterile surgical marker, an appropriate keystone flap was drawn incorporating the defect, outlining the appropriate donor tissue and placing the expected incisions within the relaxed skin tension lines where possible. The area thus outlined was incised deep to adipose tissue with a #15 scalpel blade. The skin margins were undermined to an appropriate distance in all directions around the primary defect and laterally outward around the flap utilizing iris scissors. Following this, the designed flap was carried into the primary defect and sutured into place. O-T Plasty Text: The defect edges were debeveled with a #15 scalpel blade. Given the location of the defect, shape of the defect and the proximity to free margins an O-T plasty was deemed most appropriate. Using a sterile surgical marker, an appropriate O-T plasty was drawn incorporating the defect and placing the expected incisions within the relaxed skin tension lines where possible. The area thus outlined was incised deep to adipose tissue with a #15 scalpel blade. The skin margins were undermined to an appropriate distance in all directions utilizing iris scissors. Following this, the designed flap was carried over into the primary defect and sutured into place. O-Z Plasty Text: The defect edges were debeveled with a #15 scalpel blade. Given the location of the defect, shape of the defect and the proximity to free margins an O-Z plasty (double transposition flap) was deemed most appropriate. Using a sterile surgical marker, the appropriate transposition flaps were drawn incorporating the defect and placing the expected incisions within the relaxed skin tension lines where possible. The area thus outlined was incised deep to adipose tissue with a #15 scalpel blade. The skin margins were undermined to an appropriate distance in all directions utilizing iris scissors. Hemostasis was achieved with electrocautery. The flaps were then transposed and carried over into place, one clockwise and the other counterclockwise, and anchored with interrupted buried subcutaneous sutures. Double O-Z Plasty Text: The defect edges were debeveled with a #15 scalpel blade. Given the location of the defect, shape of the defect and the proximity to free margins a Double O-Z plasty (double transposition flap) was deemed most appropriate. Using a sterile surgical marker, the appropriate transposition flaps were drawn incorporating the defect and placing the expected incisions within the relaxed skin tension lines where possible. The area thus outlined was incised deep to adipose tissue with a #15 scalpel blade. The skin margins were undermined to an appropriate distance in all directions utilizing iris scissors. Hemostasis was achieved with electrocautery. The flaps were then transposed and carried over into place, one clockwise and the other counterclockwise, and anchored with interrupted buried subcutaneous sutures. V-Y Plasty Text: The defect edges were debeveled with a #15 scalpel blade. Given the location of the defect, shape of the defect and the proximity to free margins an V-Y advancement flap was deemed most appropriate. Using a sterile surgical marker, an appropriate advancement flap was drawn incorporating the defect and placing the expected incisions within the relaxed skin tension lines where possible. The area thus outlined was incised deep to adipose tissue with a #15 scalpel blade. The skin margins were undermined to an appropriate distance in all directions utilizing iris scissors. Following this, the designed flap was advanced and carried over into the primary defect and sutured into place. H Plasty Text: Given the location of the defect, shape of the defect and the proximity to free margins a H-plasty was deemed most appropriate for repair. Using a sterile surgical marker, the appropriate advancement arms of the H-plasty were drawn incorporating the defect and placing the expected incisions within the relaxed skin tension lines where possible. The area thus outlined was incised deep to adipose tissue with a #15 scalpel blade. The skin margins were undermined to an appropriate distance in all directions utilizing iris scissors.  The opposing advancement arms were then advanced and carried over into place in opposite direction and anchored with interrupted buried subcutaneous sutures. W Plasty Text: The lesion was extirpated to the level of the fat with a #15 scalpel blade. Given the location of the defect, shape of the defect and the proximity to free margins a W-plasty was deemed most appropriate for repair. Using a sterile surgical marker, the appropriate transposition arms of the W-plasty were drawn incorporating the defect and placing the expected incisions within the relaxed skin tension lines where possible. The area thus outlined was incised deep to adipose tissue with a #15 scalpel blade. The skin margins were undermined to an appropriate distance in all directions utilizing iris scissors. The opposing transposition arms were then transposed and carried over into place in opposite direction and anchored with interrupted buried subcutaneous sutures. Z Plasty Text: The lesion was extirpated to the level of the fat with a #15 scalpel blade. Given the location of the defect, shape of the defect and the proximity to free margins a Z-plasty was deemed most appropriate for repair. Using a sterile surgical marker, the appropriate transposition arms of the Z-plasty were drawn incorporating the defect and placing the expected incisions within the relaxed skin tension lines where possible. The area thus outlined was incised deep to adipose tissue with a #15 scalpel blade. The skin margins were undermined to an appropriate distance in all directions utilizing iris scissors. The opposing transposition arms were then transposed and carried over into place in opposite direction and anchored with interrupted buried subcutaneous sutures. Double Z Plasty Text: The lesion was extirpated to the level of the fat with a #15 scalpel blade. Given the location of the defect, shape of the defect and the proximity to free margins a double Z-plasty was deemed most appropriate for repair. Using a sterile surgical marker, the appropriate transposition arms of the double Z-plasty were drawn incorporating the defect and placing the expected incisions within the relaxed skin tension lines where possible. The area thus outlined was incised deep to adipose tissue with a #15 scalpel blade. The skin margins were undermined to an appropriate distance in all directions utilizing iris scissors. The opposing transposition arms were then transposed and carried over into place in opposite direction and anchored with interrupted buried subcutaneous sutures. Zygomaticofacial Flap Text: Given the location of the defect, shape of the defect and the proximity to free margins a zygomaticofacial flap was deemed most appropriate for repair. Using a sterile surgical marker, the appropriate flap was drawn incorporating the defect and placing the expected incisions within the relaxed skin tension lines where possible. The area thus outlined was incised deep to adipose tissue with a #15 scalpel blade with preservation of a vascular pedicle.  The skin margins were undermined to an appropriate distance in all directions utilizing iris scissors. The flap was then carried over into the defect and anchored with interrupted buried subcutaneous sutures. Cheek Interpolation Flap Text: A decision was made to reconstruct the defect utilizing an interpolation axial flap and a staged reconstruction.  A telfa template was made of the defect.  This telfa template was then used to outline the Cheek Interpolation flap.  The donor area for the pedicle flap was then injected with anesthesia.  The flap was excised through the skin and subcutaneous tissue down to the layer of the underlying musculature.  The interpolation flap was carefully excised within this deep plane to maintain its blood supply.  The edges of the donor site were undermined.   The donor site was closed in a primary fashion.  The pedicle was then rotated into position and sutured.  Once the tube was sutured into place, adequate blood supply was confirmed with blanching and refill.  The pedicle was then wrapped with xeroform gauze and dressed appropriately with a telfa and gauze bandage to ensure continued blood supply and protect the attached pedicle. Cheek-To-Nose Interpolation Flap Text: A decision was made to reconstruct the defect utilizing an interpolation axial flap and a staged reconstruction.  A telfa template was made of the defect.  This telfa template was then used to outline the Cheek-To-Nose Interpolation flap.  The donor area for the pedicle flap was then injected with anesthesia.  The flap was excised through the skin and subcutaneous tissue down to the layer of the underlying musculature.  The interpolation flap was carefully excised within this deep plane to maintain its blood supply.  The edges of the donor site were undermined.   The donor site was closed in a primary fashion.  The pedicle was then rotated into position and sutured.  Once the tube was sutured into place, adequate blood supply was confirmed with blanching and refill.  The pedicle was then wrapped with xeroform gauze and dressed appropriately with a telfa and gauze bandage to ensure continued blood supply and protect the attached pedicle. Interpolation Flap Text: A decision was made to reconstruct the defect utilizing an interpolation axial flap and a staged reconstruction.  A telfa template was made of the defect.  This telfa template was then used to outline the interpolation flap.  The donor area for the pedicle flap was then injected with anesthesia.  The flap was excised through the skin and subcutaneous tissue down to the layer of the underlying musculature.  The interpolation flap was carefully excised within this deep plane to maintain its blood supply.  The edges of the donor site were undermined.   The donor site was closed in a primary fashion.  The pedicle was then rotated into position and sutured.  Once the tube was sutured into place, adequate blood supply was confirmed with blanching and refill.  The pedicle was then wrapped with xeroform gauze and dressed appropriately with a telfa and gauze bandage to ensure continued blood supply and protect the attached pedicle. Melolabial Interpolation Flap Text: A decision was made to reconstruct the defect utilizing an interpolation axial flap and a staged reconstruction.  A telfa template was made of the defect.  This telfa template was then used to outline the melolabial interpolation flap.  The donor area for the pedicle flap was then injected with anesthesia.  The flap was excised through the skin and subcutaneous tissue down to the layer of the underlying musculature.  The pedicle flap was carefully excised within this deep plane to maintain its blood supply.  The edges of the donor site were undermined.   The donor site was closed in a primary fashion.  The pedicle was then rotated into position and sutured.  Once the tube was sutured into place, adequate blood supply was confirmed with blanching and refill.  The pedicle was then wrapped with xeroform gauze and dressed appropriately with a telfa and gauze bandage to ensure continued blood supply and protect the attached pedicle. Mastoid Interpolation Flap Text: A decision was made to reconstruct the defect utilizing an interpolation axial flap and a staged reconstruction.  A telfa template was made of the defect.  This telfa template was then used to outline the mastoid interpolation flap.  The donor area for the pedicle flap was then injected with anesthesia.  The flap was excised through the skin and subcutaneous tissue down to the layer of the underlying musculature.  The pedicle flap was carefully excised within this deep plane to maintain its blood supply.  The edges of the donor site were undermined.   The donor site was closed in a primary fashion.  The pedicle was then rotated into position and sutured.  Once the tube was sutured into place, adequate blood supply was confirmed with blanching and refill.  The pedicle was then wrapped with xeroform gauze and dressed appropriately with a telfa and gauze bandage to ensure continued blood supply and protect the attached pedicle. Posterior Auricular Interpolation Flap Text: A decision was made to reconstruct the defect utilizing an interpolation axial flap and a staged reconstruction.  A telfa template was made of the defect.  This telfa template was then used to outline the posterior auricular interpolation flap.  The donor area for the pedicle flap was then injected with anesthesia.  The flap was excised through the skin and subcutaneous tissue down to the layer of the underlying musculature.  The pedicle flap was carefully excised within this deep plane to maintain its blood supply.  The edges of the donor site were undermined.   The donor site was closed in a primary fashion.  The pedicle was then rotated into position and sutured.  Once the tube was sutured into place, adequate blood supply was confirmed with blanching and refill.  The pedicle was then wrapped with xeroform gauze and dressed appropriately with a telfa and gauze bandage to ensure continued blood supply and protect the attached pedicle. Paramedian Forehead Flap Text: A decision was made to reconstruct the defect utilizing an interpolation axial flap and a staged reconstruction.  A telfa template was made of the defect.  This telfa template was then used to outline the paramedian forehead pedicle flap.  The donor area for the pedicle flap was then injected with anesthesia.  The flap was excised through the skin and subcutaneous tissue down to the layer of the underlying musculature.  The pedicle flap was carefully excised within this deep plane to maintain its blood supply.  The edges of the donor site were undermined.   The donor site was closed in a primary fashion.  The pedicle was then rotated into position and sutured.  Once the tube was sutured into place, adequate blood supply was confirmed with blanching and refill.  The pedicle was then wrapped with xeroform gauze and dressed appropriately with a telfa and gauze bandage to ensure continued blood supply and protect the attached pedicle. Abbe Flap (Upper To Lower Lip) Text: The defect of the lower lip was assessed and measured.  Given the location and size of the defect, an Abbe flap was deemed most appropriate. Using a sterile surgical marker, an appropriate Abbe flap was measured and drawn on the upper lip. Local anesthesia was then infiltrated.  A scalpel was then used to incise the upper lip through and through the skin, vermilion, muscle and mucosa, leaving the flap pedicled on the opposite side.  The flap was then rotated and transferred to the lower lip defect.  The flap was then sutured into place with a three layer technique, closing the orbicularis oris muscle layer with subcutaneous buried sutures, followed by a mucosal layer and an epidermal layer. Abbe Flap (Lower To Upper Lip) Text: The defect of the upper lip was assessed and measured.  Given the location and size of the defect, an Abbe flap was deemed most appropriate. Using a sterile surgical marker, an appropriate Abbe flap was measured and drawn on the lower lip. Local anesthesia was then infiltrated. A scalpel was then used to incise the upper lip through and through the skin, vermilion, muscle and mucosa, leaving the flap pedicled on the opposite side.  The flap was then rotated and transferred to the lower lip defect.  The flap was then sutured into place with a three layer technique, closing the orbicularis oris muscle layer with subcutaneous buried sutures, followed by a mucosal layer and an epidermal layer. Estlander Flap (Upper To Lower Lip) Text: The defect of the lower lip was assessed and measured.  Given the location and size of the defect, an Estlander flap was deemed most appropriate. Using a sterile surgical marker, an appropriate Estlander flap was measured and drawn on the upper lip. Local anesthesia was then infiltrated. A scalpel was then used to incise the lateral aspect of the flap, through skin, muscle and mucosa, leaving the flap pedicled medially.  The flap was then rotated and positioned to fill the lower lip defect.  The flap was then sutured into place with a three layer technique, closing the orbicularis oris muscle layer with subcutaneous buried sutures, followed by a mucosal layer and an epidermal layer. Lip Wedge Excision Repair Text: Given the location of the defect and the proximity to free margins a full thickness wedge repair was deemed most appropriate. Using a sterile surgical marker, the appropriate repair was drawn incorporating the defect and placing the expected incisions perpendicular to the vermilion border.  The vermilion border was also meticulously outlined to ensure appropriate reapproximation during the repair.  The area thus outlined was incised through and through with a #15 scalpel blade.  The muscularis and dermis were reaproximated with deep sutures following hemostasis. Care was taken to realign the vermilion border before proceeding with the superficial closure.  Once the vermilion was realigned the superfical and mucosal closure was finished. Ftsg Text: The defect edges were debeveled with a #15 scalpel blade. Given the location of the defect, shape of the defect and the proximity to free margins a full thickness skin graft was deemed most appropriate. Using a sterile surgical marker, the primary defect shape was transferred to the donor site. The area thus outlined was incised deep to adipose tissue with a #15 scalpel blade.  The harvested graft was then trimmed of adipose tissue until only dermis and epidermis was left.  The skin margins of the secondary defect were undermined to an appropriate distance in all directions utilizing iris scissors.  The secondary defect was closed with interrupted buried subcutaneous sutures.  The skin edges were then re-apposed with running  sutures.  The skin graft was then placed in the primary defect and oriented appropriately. Split-Thickness Skin Graft Text: The defect edges were debeveled with a #15 scalpel blade. Given the location of the defect, shape of the defect and the proximity to free margins a split thickness skin graft was deemed most appropriate. Using a sterile surgical marker, the primary defect shape was transferred to the donor site. The split thickness graft was then harvested.  The skin graft was then placed in the primary defect and oriented appropriately. Pinch Graft Text: The defect edges were debeveled with a #15 scalpel blade. Given the location of the defect, shape of the defect and the proximity to free margins a pinch graft was deemed most appropriate. Using a sterile surgical marker, the primary defect shape was transferred to the donor site. The area thus outlined was incised deep to adipose tissue with a #15 scalpel blade.  The harvested graft was then trimmed of adipose tissue until only dermis and epidermis was left. The skin margins of the secondary defect were undermined to an appropriate distance in all directions utilizing iris scissors.  The secondary defect was closed with interrupted buried subcutaneous sutures.  The skin edges were then re-apposed with running  sutures.  The skin graft was then placed in the primary defect and oriented appropriately. Burow's Graft Text: The defect edges were debeveled with a #15 scalpel blade. Given the location of the defect, shape of the defect, the proximity to free margins and the presence of a standing cone deformity a Burow's skin graft was deemed most appropriate. The standing cone was removed and this tissue was then trimmed to the shape of the primary defect. The adipose tissue was also removed until only dermis and epidermis were left.  The skin margins of the secondary defect were undermined to an appropriate distance in all directions utilizing iris scissors.  The secondary defect was closed with interrupted buried subcutaneous sutures.  The skin edges were then re-apposed with running  sutures.  The skin graft was then placed in the primary defect and oriented appropriately. Cartilage Graft Text: The defect edges were debeveled with a #15 scalpel blade. Given the location of the defect, shape of the defect, the fact the defect involved a full thickness cartilage defect a cartilage graft was deemed most appropriate.  An appropriate donor site was identified, cleansed, and anesthetized. The cartilage graft was then harvested and transferred to the recipient site, oriented appropriately and then sutured into place.  The secondary defect was then repaired using a primary closure. Composite Graft Text: The defect edges were debeveled with a #15 scalpel blade. Given the location of the defect, shape of the defect, the proximity to free margins and the fact the defect was full thickness a composite graft was deemed most appropriate.  The defect was outline and then transferred to the donor site.  A full thickness graft was then excised from the donor site. The graft was then placed in the primary defect, oriented appropriately and then sutured into place.  The secondary defect was then repaired using a primary closure. Epidermal Autograft Text: The defect edges were debeveled with a #15 scalpel blade. Given the location of the defect, shape of the defect and the proximity to free margins an epidermal autograft was deemed most appropriate. Using a sterile surgical marker, the primary defect shape was transferred to the donor site. The epidermal graft was then harvested.  The skin graft was then placed in the primary defect and oriented appropriately. Dermal Autograft Text: The defect edges were debeveled with a #15 scalpel blade. Given the location of the defect, shape of the defect and the proximity to free margins a dermal autograft was deemed most appropriate. Using a sterile surgical marker, the primary defect shape was transferred to the donor site. The area thus outlined was incised deep to adipose tissue with a #15 scalpel blade.  The harvested graft was then trimmed of adipose and epidermal tissue until only dermis was left.  The skin graft was then placed in the primary defect and oriented appropriately. Skin Substitute Text: The defect edges were debeveled with a #15 scalpel blade. Given the location of the defect, shape of the defect and the proximity to free margins a skin substitute graft was deemed most appropriate.  The graft material was trimmed to fit the size of the defect. The graft was then placed in the primary defect and oriented appropriately. Tissue Cultured Epidermal Autograft Text: The defect edges were debeveled with a #15 scalpel blade. Given the location of the defect, shape of the defect and the proximity to free margins a tissue cultured epidermal autograft was deemed most appropriate.  The graft was then trimmed to fit the size of the defect.  The graft was then placed in the primary defect and oriented appropriately. Xenograft Text: The defect edges were debeveled with a #15 scalpel blade. Given the location of the defect, shape of the defect and the proximity to free margins a xenograft was deemed most appropriate.  The graft was then trimmed to fit the size of the defect.  The graft was then placed in the primary defect and oriented appropriately. Purse String (Intermediate) Text: Given the location of the defect and the characteristics of the surrounding skin a purse string intermediate closure was deemed most appropriate.  Undermining was performed circumferentially around the surgical defect.  A purse string suture was then placed and tightened. Purse String (Simple) Text: Given the location of the defect and the characteristics of the surrounding skin a purse string simple closure was deemed most appropriate.  Undermining was performed circumferentially around the surgical defect.  A purse string suture was then placed and tightened. Complex Repair And Single Advancement Flap Text: The defect edges were debeveled with a #15 scalpel blade.  The primary defect was closed partially with a complex linear closure.  Given the location of the remaining defect, shape of the defect and the proximity to free margins a single advancement flap was deemed most appropriate for complete closure of the defect.  Using a sterile surgical marker, an appropriate advancement flap was drawn incorporating the defect and placing the expected incisions within the relaxed skin tension lines where possible. The area thus outlined was incised deep to adipose tissue with a #15 scalpel blade. The skin margins were undermined to an appropriate distance in all directions utilizing iris scissors and carried over to close the primary defect. Complex Repair And Double Advancement Flap Text: The defect edges were debeveled with a #15 scalpel blade.  The primary defect was closed partially with a complex linear closure.  Given the location of the remaining defect, shape of the defect and the proximity to free margins a double advancement flap was deemed most appropriate for complete closure of the defect.  Using a sterile surgical marker, an appropriate advancement flap was drawn incorporating the defect and placing the expected incisions within the relaxed skin tension lines where possible. The area thus outlined was incised deep to adipose tissue with a #15 scalpel blade. The skin margins were undermined to an appropriate distance in all directions utilizing iris scissors and carried over to close the primary defect. Complex Repair And Modified Advancement Flap Text: The defect edges were debeveled with a #15 scalpel blade.  The primary defect was closed partially with a complex linear closure.  Given the location of the remaining defect, shape of the defect and the proximity to free margins a modified advancement flap was deemed most appropriate for complete closure of the defect.  Using a sterile surgical marker, an appropriate advancement flap was drawn incorporating the defect and placing the expected incisions within the relaxed skin tension lines where possible. The area thus outlined was incised deep to adipose tissue with a #15 scalpel blade. The skin margins were undermined to an appropriate distance in all directions utilizing iris scissors and carried over to close the primary defect. Complex Repair And A-T Advancement Flap Text: The defect edges were debeveled with a #15 scalpel blade.  The primary defect was closed partially with a complex linear closure.  Given the location of the remaining defect, shape of the defect and the proximity to free margins an A-T advancement flap was deemed most appropriate for complete closure of the defect.  Using a sterile surgical marker, an appropriate advancement flap was drawn incorporating the defect and placing the expected incisions within the relaxed skin tension lines where possible. The area thus outlined was incised deep to adipose tissue with a #15 scalpel blade. The skin margins were undermined to an appropriate distance in all directions utilizing iris scissors and carried over to close the primary defect. Complex Repair And O-T Advancement Flap Text: The defect edges were debeveled with a #15 scalpel blade.  The primary defect was closed partially with a complex linear closure.  Given the location of the remaining defect, shape of the defect and the proximity to free margins an O-T advancement flap was deemed most appropriate for complete closure of the defect.  Using a sterile surgical marker, an appropriate advancement flap was drawn incorporating the defect and placing the expected incisions within the relaxed skin tension lines where possible. The area thus outlined was incised deep to adipose tissue with a #15 scalpel blade. The skin margins were undermined to an appropriate distance in all directions utilizing iris scissors and carried over to close the primary defect. Complex Repair And O-L Flap Text: The defect edges were debeveled with a #15 scalpel blade.  The primary defect was closed partially with a complex linear closure.  Given the location of the remaining defect, shape of the defect and the proximity to free margins an O-L flap was deemed most appropriate for complete closure of the defect.  Using a sterile surgical marker, an appropriate flap was drawn incorporating the defect and placing the expected incisions within the relaxed skin tension lines where possible. The area thus outlined was incised deep to adipose tissue with a #15 scalpel blade. The skin margins were undermined to an appropriate distance in all directions utilizing iris scissors and carried over to close the primary defect. Complex Repair And Bilobe Flap Text: The defect edges were debeveled with a #15 scalpel blade.  The primary defect was closed partially with a complex linear closure.  Given the location of the remaining defect, shape of the defect and the proximity to free margins a bilobe flap was deemed most appropriate for complete closure of the defect.  Using a sterile surgical marker, an appropriate advancement flap was drawn incorporating the defect and placing the expected incisions within the relaxed skin tension lines where possible. The area thus outlined was incised deep to adipose tissue with a #15 scalpel blade. The skin margins were undermined to an appropriate distance in all directions utilizing iris scissors and carried over to close the primary defect. Complex Repair And Melolabial Flap Text: The defect edges were debeveled with a #15 scalpel blade.  The primary defect was closed partially with a complex linear closure.  Given the location of the remaining defect, shape of the defect and the proximity to free margins a melolabial flap was deemed most appropriate for complete closure of the defect.  Using a sterile surgical marker, an appropriate advancement flap was drawn incorporating the defect and placing the expected incisions within the relaxed skin tension lines where possible. The area thus outlined was incised deep to adipose tissue with a #15 scalpel blade. The skin margins were undermined to an appropriate distance in all directions utilizing iris scissors and carried over to close the primary defect. Complex Repair And Rotation Flap Text: The defect edges were debeveled with a #15 scalpel blade.  The primary defect was closed partially with a complex linear closure.  Given the location of the remaining defect, shape of the defect and the proximity to free margins a rotation flap was deemed most appropriate for complete closure of the defect.  Using a sterile surgical marker, an appropriate advancement flap was drawn incorporating the defect and placing the expected incisions within the relaxed skin tension lines where possible. The area thus outlined was incised deep to adipose tissue with a #15 scalpel blade. The skin margins were undermined to an appropriate distance in all directions utilizing iris scissors and carried over to close the primary defect. Complex Repair And Rhombic Flap Text: The defect edges were debeveled with a #15 scalpel blade.  The primary defect was closed partially with a complex linear closure.  Given the location of the remaining defect, shape of the defect and the proximity to free margins a rhombic flap was deemed most appropriate for complete closure of the defect.  Using a sterile surgical marker, an appropriate advancement flap was drawn incorporating the defect and placing the expected incisions within the relaxed skin tension lines where possible. The area thus outlined was incised deep to adipose tissue with a #15 scalpel blade. The skin margins were undermined to an appropriate distance in all directions utilizing iris scissors and carried over to close the primary defect. Complex Repair And Transposition Flap Text: The defect edges were debeveled with a #15 scalpel blade.  The primary defect was closed partially with a complex linear closure.  Given the location of the remaining defect, shape of the defect and the proximity to free margins a transposition flap was deemed most appropriate for complete closure of the defect.  Using a sterile surgical marker, an appropriate advancement flap was drawn incorporating the defect and placing the expected incisions within the relaxed skin tension lines where possible. The area thus outlined was incised deep to adipose tissue with a #15 scalpel blade. The skin margins were undermined to an appropriate distance in all directions utilizing iris scissors and carried over to close the primary defect. Complex Repair And V-Y Plasty Text: The defect edges were debeveled with a #15 scalpel blade.  The primary defect was closed partially with a complex linear closure.  Given the location of the remaining defect, shape of the defect and the proximity to free margins a V-Y plasty was deemed most appropriate for complete closure of the defect.  Using a sterile surgical marker, an appropriate advancement flap was drawn incorporating the defect and placing the expected incisions within the relaxed skin tension lines where possible. The area thus outlined was incised deep to adipose tissue with a #15 scalpel blade. The skin margins were undermined to an appropriate distance in all directions utilizing iris scissors and carried over to close the primary defect. Complex Repair And M Plasty Text: The defect edges were debeveled with a #15 scalpel blade.  The primary defect was closed partially with a complex linear closure.  Given the location of the remaining defect, shape of the defect and the proximity to free margins an M plasty was deemed most appropriate for complete closure of the defect.  Using a sterile surgical marker, an appropriate advancement flap was drawn incorporating the defect and placing the expected incisions within the relaxed skin tension lines where possible. The area thus outlined was incised deep to adipose tissue with a #15 scalpel blade. The skin margins were undermined to an appropriate distance in all directions utilizing iris scissors and carried over to close the primary defect. Complex Repair And Double M Plasty Text: The defect edges were debeveled with a #15 scalpel blade.  The primary defect was closed partially with a complex linear closure.  Given the location of the remaining defect, shape of the defect and the proximity to free margins a double M plasty was deemed most appropriate for complete closure of the defect.  Using a sterile surgical marker, an appropriate advancement flap was drawn incorporating the defect and placing the expected incisions within the relaxed skin tension lines where possible. The area thus outlined was incised deep to adipose tissue with a #15 scalpel blade. The skin margins were undermined to an appropriate distance in all directions utilizing iris scissors and carried over to close the primary defect. Complex Repair And W Plasty Text: The defect edges were debeveled with a #15 scalpel blade.  The primary defect was closed partially with a complex linear closure.  Given the location of the remaining defect, shape of the defect and the proximity to free margins a W plasty was deemed most appropriate for complete closure of the defect.  Using a sterile surgical marker, an appropriate advancement flap was drawn incorporating the defect and placing the expected incisions within the relaxed skin tension lines where possible. The area thus outlined was incised deep to adipose tissue with a #15 scalpel blade. The skin margins were undermined to an appropriate distance in all directions utilizing iris scissors and carried over to close the primary defect. Complex Repair And Z Plasty Text: The defect edges were debeveled with a #15 scalpel blade.  The primary defect was closed partially with a complex linear closure.  Given the location of the remaining defect, shape of the defect and the proximity to free margins a Z plasty was deemed most appropriate for complete closure of the defect.  Using a sterile surgical marker, an appropriate advancement flap was drawn incorporating the defect and placing the expected incisions within the relaxed skin tension lines where possible. The area thus outlined was incised deep to adipose tissue with a #15 scalpel blade. The skin margins were undermined to an appropriate distance in all directions utilizing iris scissors and carried over to close the primary defect. Complex Repair And Dorsal Nasal Flap Text: The defect edges were debeveled with a #15 scalpel blade.  The primary defect was closed partially with a complex linear closure.  Given the location of the remaining defect, shape of the defect and the proximity to free margins a dorsal nasal flap was deemed most appropriate for complete closure of the defect.  Using a sterile surgical marker, an appropriate flap was drawn incorporating the defect and placing the expected incisions within the relaxed skin tension lines where possible. The area thus outlined was incised deep to adipose tissue with a #15 scalpel blade. The skin margins were undermined to an appropriate distance in all directions utilizing iris scissors and carried over to close the primary defect. Complex Repair And Ftsg Text: The defect edges were debeveled with a #15 scalpel blade.  The primary defect was closed partially with a complex linear closure.  Given the location of the defect, shape of the defect and the proximity to free margins a full thickness skin graft was deemed most appropriate to repair the remaining defect.  The graft was trimmed to fit the size of the remaining defect.  The graft was then placed in the primary defect, oriented appropriately, and sutured into place. Complex Repair And Burow's Graft Text: The defect edges were debeveled with a #15 scalpel blade.  The primary defect was closed partially with a complex linear closure.  Given the location of the defect, shape of the defect, the proximity to free margins and the presence of a standing cone deformity a Burow's graft was deemed most appropriate to repair the remaining defect.  The graft was trimmed to fit the size of the remaining defect.  The graft was then placed in the primary defect, oriented appropriately, and sutured into place. Complex Repair And Split-Thickness Skin Graft Text: The defect edges were debeveled with a #15 scalpel blade.  The primary defect was closed partially with a complex linear closure.  Given the location of the defect, shape of the defect and the proximity to free margins a split thickness skin graft was deemed most appropriate to repair the remaining defect.  The graft was trimmed to fit the size of the remaining defect.  The graft was then placed in the primary defect, oriented appropriately, and sutured into place. Complex Repair And Epidermal Autograft Text: The defect edges were debeveled with a #15 scalpel blade.  The primary defect was closed partially with a complex linear closure.  Given the location of the defect, shape of the defect and the proximity to free margins an epidermal autograft was deemed most appropriate to repair the remaining defect.  The graft was trimmed to fit the size of the remaining defect.  The graft was then placed in the primary defect, oriented appropriately, and sutured into place. Complex Repair And Dermal Autograft Text: The defect edges were debeveled with a #15 scalpel blade.  The primary defect was closed partially with a complex linear closure.  Given the location of the defect, shape of the defect and the proximity to free margins an dermal autograft was deemed most appropriate to repair the remaining defect.  The graft was trimmed to fit the size of the remaining defect.  The graft was then placed in the primary defect, oriented appropriately, and sutured into place. Complex Repair And Tissue Cultured Epidermal Autograft Text: The defect edges were debeveled with a #15 scalpel blade.  The primary defect was closed partially with a complex linear closure.  Given the location of the defect, shape of the defect and the proximity to free margins an tissue cultured epidermal autograft was deemed most appropriate to repair the remaining defect.  The graft was trimmed to fit the size of the remaining defect.  The graft was then placed in the primary defect, oriented appropriately, and sutured into place. Complex Repair And Xenograft Text: The defect edges were debeveled with a #15 scalpel blade.  The primary defect was closed partially with a complex linear closure.  Given the location of the defect, shape of the defect and the proximity to free margins a xenograft was deemed most appropriate to repair the remaining defect.  The graft was trimmed to fit the size of the remaining defect.  The graft was then placed in the primary defect, oriented appropriately, and sutured into place. Complex Repair And Skin Substitute Graft Text: The defect edges were debeveled with a #15 scalpel blade.  The primary defect was closed partially with a complex linear closure.  Given the location of the remaining defect, shape of the defect and the proximity to free margins a skin substitute graft was deemed most appropriate to repair the remaining defect.  The graft was trimmed to fit the size of the remaining defect.  The graft was then placed in the primary defect, oriented appropriately, and sutured into place. Include Anticoagulation In Mohs Note?: Please Select the Appropriate Response Path Notes (To The Dermatopathologist): Please check margins. Consent was obtained from the patient. The risks and benefits to therapy were discussed in detail. Specifically, the risks of infection, scarring, bleeding, prolonged wound healing, incomplete removal, allergy to anesthesia, nerve injury and recurrence were addressed. Prior to the procedure, the treatment site was clearly identified and confirmed by the patient. All components of Universal Protocol/PAUSE Rule completed. Post-Care Instructions: I reviewed with the patient in detail post-care instructions. Patient is not to engage in any heavy lifting, exercise, or swimming for the next 14 days. Should the patient develop any fevers, chills, bleeding, severe pain patient will contact the office immediately. Home Suture Removal Text: Patient was provided a home suture removal kit and will remove their sutures at home.  If they have any questions or difficulties they will call the office. Where Do You Want The Question To Include Opioid Counseling Located?: Case Summary Tab Billing Type: Third-Party Bill Information: Selecting Yes will display possible errors in your note based on the variables you have selected. This validation is only offered as a suggestion for you. PLEASE NOTE THAT THE VALIDATION TEXT WILL BE REMOVED WHEN YOU FINALIZE YOUR NOTE. IF YOU WANT TO FAX A PRELIMINARY NOTE YOU WILL NEED TO TOGGLE THIS TO 'NO' IF YOU DO NOT WANT IT IN YOUR FAXED NOTE.

## 2025-03-05 NOTE — PATIENT INSTRUCTIONS
[facility] Medication Refill Request Information:  * Please contact your pharmacy regarding ANY request for medication refills.  ** [facility] Prescription Fax = [phone]  * Please allow 3 business days for routine medication refills.  * Please allow 5 business days for controlled substance medication refills.     [facility] Test Result notification information:  *You will be notified with in 7-10 days of your appointment day regarding the results of your test.  If you are on MyChart you will be notified as soon as the provider has reviewed the results and signed off on them.    [facility] [phone]      Detail Level: Zone

## (undated) DEVICE — TAPE CLOTH 3" CARDINAL 3TRCL03

## (undated) DEVICE — SUCTION MANIFOLD DORNOCH ULTRA CART UL-CL500

## (undated) DEVICE — SYR 03ML LL W/O NDL 309657

## (undated) DEVICE — KIT ENDO FIRST STEP DISINFECTANT 200ML W/POUCH EP-4

## (undated) DEVICE — KIT CONNECTOR FOR OLYMPUS ENDOSCOPES DEFENDO 100310

## (undated) DEVICE — PAD CHUX UNDERPAD 23X24" 7136

## (undated) DEVICE — LIGATOR SPEEDBAND SUPERVIEW 7 BAND LATEX FREE 4225

## (undated) DEVICE — SYR 01ML MEDALLION SALINE BLUE MSS011-LB

## (undated) DEVICE — ENDO DEVICE LOCKING AND BIOPSY CAP M00545261

## (undated) DEVICE — PACK ENDOSCOPY GI CUSTOM UMMC

## (undated) DEVICE — SOL WATER IRRIG 1000ML BOTTLE 2F7114

## (undated) DEVICE — ENDO BITE BLOCK ADULT OMNI-BLOC

## (undated) DEVICE — ENDO CAP AND TUBING STERILE FOR ENDOGATOR  100130

## (undated) DEVICE — ENDO TUBING CO2 SMARTCAP STERILE DISP 100145CO2EXT

## (undated) DEVICE — WIPE PREMOIST CLEANSING WASHCLOTHS 7988

## (undated) DEVICE — RAD RX LIPIODOL 48% (10ML) LP-AMP-10 CHARGE PER ML

## (undated) DEVICE — NDL BLUNT 18GA 1" W/O FILTER 305181

## (undated) DEVICE — BALLOON ULTRAMATRIX FOR OLYMPUS EUS LINEAR LATEX FREE USB-OL

## (undated) DEVICE — ENDO CONNECTOR ENDOGATOR AUX WATER JET FOR OLYMPUS SCOPE

## (undated) DEVICE — SPECIMEN CONTAINER 3OZ W/FORMALIN 59901

## (undated) DEVICE — DRAPE MAYO STAND 23X54 8337

## (undated) DEVICE — ENDO NDL ASPIRATION 19GA FLEX SLIMLINE EXPECT EUS M00555530

## (undated) DEVICE — Device

## (undated) DEVICE — ENDO NDL ASPIRATION 22GA SLIMLINE EXPECT EUS M00555510

## (undated) DEVICE — BRUSH CLEANING BLUE BULLET DISP 13MMX250CM 476

## (undated) RX ORDER — LIDOCAINE HYDROCHLORIDE 20 MG/ML
INJECTION, SOLUTION EPIDURAL; INFILTRATION; INTRACAUDAL; PERINEURAL
Status: DISPENSED
Start: 2018-03-05

## (undated) RX ORDER — LIDOCAINE HYDROCHLORIDE 20 MG/ML
INJECTION, SOLUTION EPIDURAL; INFILTRATION; INTRACAUDAL; PERINEURAL
Status: DISPENSED
Start: 2018-01-01

## (undated) RX ORDER — ONDANSETRON 2 MG/ML
INJECTION INTRAMUSCULAR; INTRAVENOUS
Status: DISPENSED
Start: 2018-04-16

## (undated) RX ORDER — HEPARIN SODIUM,PORCINE 10 UNIT/ML
VIAL (ML) INTRAVENOUS
Status: DISPENSED
Start: 2018-01-19

## (undated) RX ORDER — FENTANYL CITRATE 50 UG/ML
INJECTION, SOLUTION INTRAMUSCULAR; INTRAVENOUS
Status: DISPENSED
Start: 2018-04-16

## (undated) RX ORDER — PROPOFOL 10 MG/ML
INJECTION, EMULSION INTRAVENOUS
Status: DISPENSED
Start: 2018-01-01

## (undated) RX ORDER — ONDANSETRON 2 MG/ML
INJECTION INTRAMUSCULAR; INTRAVENOUS
Status: DISPENSED
Start: 2018-01-01

## (undated) RX ORDER — HEPARIN SODIUM (PORCINE) LOCK FLUSH IV SOLN 100 UNIT/ML 100 UNIT/ML
SOLUTION INTRAVENOUS
Status: DISPENSED
Start: 2017-03-30

## (undated) RX ORDER — DEXAMETHASONE SODIUM PHOSPHATE 4 MG/ML
INJECTION, SOLUTION INTRA-ARTICULAR; INTRALESIONAL; INTRAMUSCULAR; INTRAVENOUS; SOFT TISSUE
Status: DISPENSED
Start: 2018-03-05

## (undated) RX ORDER — FENTANYL CITRATE 50 UG/ML
INJECTION, SOLUTION INTRAMUSCULAR; INTRAVENOUS
Status: DISPENSED
Start: 2018-01-01

## (undated) RX ORDER — PROPOFOL 10 MG/ML
INJECTION, EMULSION INTRAVENOUS
Status: DISPENSED
Start: 2018-04-16

## (undated) RX ORDER — FENTANYL CITRATE 50 UG/ML
INJECTION, SOLUTION INTRAMUSCULAR; INTRAVENOUS
Status: DISPENSED
Start: 2018-03-05

## (undated) RX ORDER — GLYCOPYRROLATE 0.2 MG/ML
INJECTION, SOLUTION INTRAMUSCULAR; INTRAVENOUS
Status: DISPENSED
Start: 2018-04-16

## (undated) RX ORDER — LIDOCAINE HYDROCHLORIDE 10 MG/ML
INJECTION, SOLUTION EPIDURAL; INFILTRATION; INTRACAUDAL; PERINEURAL
Status: DISPENSED
Start: 2019-01-01

## (undated) RX ORDER — PHENYLEPHRINE HCL IN 0.9% NACL 1 MG/10 ML
SYRINGE (ML) INTRAVENOUS
Status: DISPENSED
Start: 2018-01-01

## (undated) RX ORDER — LIDOCAINE HYDROCHLORIDE 20 MG/ML
INJECTION, SOLUTION EPIDURAL; INFILTRATION; INTRACAUDAL; PERINEURAL
Status: DISPENSED
Start: 2018-04-16

## (undated) RX ORDER — HEPARIN SODIUM (PORCINE) LOCK FLUSH IV SOLN 100 UNIT/ML 100 UNIT/ML
SOLUTION INTRAVENOUS
Status: DISPENSED
Start: 2017-01-25

## (undated) RX ORDER — FENTANYL CITRATE 50 UG/ML
INJECTION, SOLUTION INTRAMUSCULAR; INTRAVENOUS
Status: DISPENSED
Start: 2018-01-19

## (undated) RX ORDER — PROPOFOL 10 MG/ML
INJECTION, EMULSION INTRAVENOUS
Status: DISPENSED
Start: 2018-02-12

## (undated) RX ORDER — LIDOCAINE HYDROCHLORIDE 10 MG/ML
INJECTION, SOLUTION INFILTRATION; PERINEURAL
Status: DISPENSED
Start: 2017-12-26

## (undated) RX ORDER — ONDANSETRON 2 MG/ML
INJECTION INTRAMUSCULAR; INTRAVENOUS
Status: DISPENSED
Start: 2018-02-12

## (undated) RX ORDER — GLYCOPYRROLATE 0.2 MG/ML
INJECTION, SOLUTION INTRAMUSCULAR; INTRAVENOUS
Status: DISPENSED
Start: 2018-02-12

## (undated) RX ORDER — HEPARIN SODIUM (PORCINE) LOCK FLUSH IV SOLN 100 UNIT/ML 100 UNIT/ML
SOLUTION INTRAVENOUS
Status: DISPENSED
Start: 2017-02-02

## (undated) RX ORDER — ALBUMIN (HUMAN) 12.5 G/50ML
SOLUTION INTRAVENOUS
Status: DISPENSED
Start: 2019-01-01

## (undated) RX ORDER — ALBUMIN, HUMAN INJ 5% 5 %
SOLUTION INTRAVENOUS
Status: DISPENSED
Start: 2018-04-16

## (undated) RX ORDER — ONDANSETRON 2 MG/ML
INJECTION INTRAMUSCULAR; INTRAVENOUS
Status: DISPENSED
Start: 2018-03-05

## (undated) RX ORDER — DIPHENHYDRAMINE HYDROCHLORIDE 50 MG/ML
INJECTION INTRAMUSCULAR; INTRAVENOUS
Status: DISPENSED
Start: 2017-01-25

## (undated) RX ORDER — LIDOCAINE HYDROCHLORIDE 10 MG/ML
INJECTION, SOLUTION EPIDURAL; INFILTRATION; INTRACAUDAL; PERINEURAL
Status: DISPENSED
Start: 2018-01-01

## (undated) RX ORDER — GLYCOPYRROLATE 0.2 MG/ML
INJECTION, SOLUTION INTRAMUSCULAR; INTRAVENOUS
Status: DISPENSED
Start: 2018-03-05

## (undated) RX ORDER — PHENYLEPHRINE HCL IN 0.9% NACL 1 MG/10 ML
SYRINGE (ML) INTRAVENOUS
Status: DISPENSED
Start: 2018-04-16

## (undated) RX ORDER — BUPIVACAINE HYDROCHLORIDE 2.5 MG/ML
INJECTION, SOLUTION EPIDURAL; INFILTRATION; INTRACAUDAL
Status: DISPENSED
Start: 2018-01-01

## (undated) RX ORDER — FENTANYL CITRATE 50 UG/ML
INJECTION, SOLUTION INTRAMUSCULAR; INTRAVENOUS
Status: DISPENSED
Start: 2017-01-25

## (undated) RX ORDER — ALBUMIN (HUMAN) 12.5 G/50ML
SOLUTION INTRAVENOUS
Status: DISPENSED
Start: 2017-12-26

## (undated) RX ORDER — FENTANYL CITRATE 50 UG/ML
INJECTION, SOLUTION INTRAMUSCULAR; INTRAVENOUS
Status: DISPENSED
Start: 2018-02-12

## (undated) RX ORDER — EPHEDRINE SULFATE 50 MG/ML
INJECTION, SOLUTION INTRAMUSCULAR; INTRAVENOUS; SUBCUTANEOUS
Status: DISPENSED
Start: 2018-01-01

## (undated) RX ORDER — DEXAMETHASONE SODIUM PHOSPHATE 4 MG/ML
INJECTION, SOLUTION INTRA-ARTICULAR; INTRALESIONAL; INTRAMUSCULAR; INTRAVENOUS; SOFT TISSUE
Status: DISPENSED
Start: 2018-01-01